# Patient Record
Sex: MALE | Race: WHITE | Employment: OTHER | URBAN - METROPOLITAN AREA
[De-identification: names, ages, dates, MRNs, and addresses within clinical notes are randomized per-mention and may not be internally consistent; named-entity substitution may affect disease eponyms.]

---

## 2017-04-04 PROBLEM — E83.51 HYPOCALCEMIA: Status: ACTIVE | Noted: 2017-04-04

## 2017-08-30 PROBLEM — C79.51 BONE METASTASIS: Status: ACTIVE | Noted: 2017-08-30

## 2018-03-13 ENCOUNTER — TELEPHONE (OUTPATIENT)
Dept: INFUSION THERAPY | Age: 68
End: 2018-03-13

## 2018-03-14 ENCOUNTER — TELEPHONE (OUTPATIENT)
Dept: INFUSION THERAPY | Age: 68
End: 2018-03-14

## 2018-03-20 ENCOUNTER — HOSPITAL ENCOUNTER (OUTPATIENT)
Dept: INFUSION THERAPY | Age: 68
Discharge: HOME | End: 2018-03-20
Payer: COMMERCIAL

## 2018-03-20 ENCOUNTER — OFFICE VISIT (OUTPATIENT)
Dept: ONCOLOGY | Age: 68
End: 2018-03-20
Payer: COMMERCIAL

## 2018-03-20 ENCOUNTER — TELEPHONE (OUTPATIENT)
Dept: INFUSION THERAPY | Age: 68
End: 2018-03-20

## 2018-03-20 VITALS
TEMPERATURE: 98 F | HEART RATE: 70 BPM | SYSTOLIC BLOOD PRESSURE: 112 MMHG | BODY MASS INDEX: 25.69 KG/M2 | HEIGHT: 73 IN | RESPIRATION RATE: 20 BRPM | WEIGHT: 193.8 LBS | DIASTOLIC BLOOD PRESSURE: 63 MMHG

## 2018-03-20 VITALS — RESPIRATION RATE: 20 BRPM | DIASTOLIC BLOOD PRESSURE: 72 MMHG | HEART RATE: 67 BPM | SYSTOLIC BLOOD PRESSURE: 117 MMHG

## 2018-03-20 DIAGNOSIS — C78.7 RECTAL CANCER METASTASIZED TO LIVER (HCC): Primary | ICD-10-CM

## 2018-03-20 DIAGNOSIS — C78.7 RECTAL CANCER METASTASIZED TO LIVER (HCC): ICD-10-CM

## 2018-03-20 DIAGNOSIS — C20 RECTAL CANCER METASTASIZED TO LIVER (HCC): ICD-10-CM

## 2018-03-20 DIAGNOSIS — C20 RECTAL CANCER METASTASIZED TO LIVER (HCC): Primary | ICD-10-CM

## 2018-03-20 LAB
ALBUMIN SERPL-MCNC: 3.1 G/DL (ref 3.5–5.2)
ALP BLD-CCNC: 161 U/L (ref 40–129)
ALT SERPL-CCNC: 27 U/L (ref 0–40)
ANION GAP SERPL CALCULATED.3IONS-SCNC: 10 MMOL/L (ref 7–16)
ANISOCYTOSIS: ABNORMAL
AST SERPL-CCNC: 31 U/L (ref 0–39)
BASOPHILS ABSOLUTE: 0 E9/L (ref 0–0.2)
BASOPHILS RELATIVE PERCENT: 0.9 % (ref 0–2)
BILIRUB SERPL-MCNC: 0.5 MG/DL (ref 0–1.2)
BUN BLDV-MCNC: 8 MG/DL (ref 8–23)
CALCIUM SERPL-MCNC: 8.1 MG/DL (ref 8.6–10.2)
CEA: 5.7 NG/ML (ref 0–5.2)
CHLORIDE BLD-SCNC: 110 MMOL/L (ref 98–107)
CO2: 22 MMOL/L (ref 22–29)
CREAT SERPL-MCNC: 0.9 MG/DL (ref 0.7–1.2)
EOSINOPHILS ABSOLUTE: 0.07 E9/L (ref 0.05–0.5)
EOSINOPHILS RELATIVE PERCENT: 1 % (ref 0–6)
GFR AFRICAN AMERICAN: >60
GFR NON-AFRICAN AMERICAN: >60 ML/MIN/1.73
GLUCOSE BLD-MCNC: 122 MG/DL (ref 74–109)
HCT VFR BLD CALC: 30.9 % (ref 37–54)
HEMOGLOBIN: 9.9 G/DL (ref 12.5–16.5)
LYMPHOCYTES ABSOLUTE: 0.56 E9/L (ref 1.5–4)
LYMPHOCYTES RELATIVE PERCENT: 7.5 % (ref 20–42)
MCH RBC QN AUTO: 33 PG (ref 26–35)
MCHC RBC AUTO-ENTMCNC: 32 % (ref 32–34.5)
MCV RBC AUTO: 103 FL (ref 80–99.9)
MONOCYTES ABSOLUTE: 0.28 E9/L (ref 0.1–0.95)
MONOCYTES RELATIVE PERCENT: 3.5 % (ref 2–12)
MYELOCYTE PERCENT: 2.5 % (ref 0–0)
NEUTROPHILS ABSOLUTE: 6.16 E9/L (ref 1.8–7.3)
NEUTROPHILS RELATIVE PERCENT: 85.5 % (ref 43–80)
NUCLEATED RED BLOOD CELLS: 0.5 /100 WBC
OVALOCYTES: ABNORMAL
PDW BLD-RTO: 17.2 FL (ref 11.5–15)
PLATELET # BLD: 109 E9/L (ref 130–450)
PMV BLD AUTO: 11.6 FL (ref 7–12)
POIKILOCYTES: ABNORMAL
POLYCHROMASIA: ABNORMAL
POTASSIUM SERPL-SCNC: 3.8 MMOL/L (ref 3.5–5)
RBC # BLD: 3 E12/L (ref 3.8–5.8)
SODIUM BLD-SCNC: 142 MMOL/L (ref 132–146)
TEAR DROP CELLS: ABNORMAL
TOTAL PROTEIN: 5.6 G/DL (ref 6.4–8.3)
TOXIC GRANULATION: ABNORMAL
WBC # BLD: 7 E9/L (ref 4.5–11.5)

## 2018-03-20 PROCEDURE — 96549 UNLISTED CHEMOTHERAPY PX: CPT

## 2018-03-20 PROCEDURE — 1036F TOBACCO NON-USER: CPT | Performed by: INTERNAL MEDICINE

## 2018-03-20 PROCEDURE — 82378 CARCINOEMBRYONIC ANTIGEN: CPT

## 2018-03-20 PROCEDURE — 96415 CHEMO IV INFUSION ADDL HR: CPT

## 2018-03-20 PROCEDURE — 2580000003 HC RX 258: Performed by: INTERNAL MEDICINE

## 2018-03-20 PROCEDURE — G8427 DOCREV CUR MEDS BY ELIG CLIN: HCPCS | Performed by: INTERNAL MEDICINE

## 2018-03-20 PROCEDURE — 3017F COLORECTAL CA SCREEN DOC REV: CPT | Performed by: INTERNAL MEDICINE

## 2018-03-20 PROCEDURE — G8484 FLU IMMUNIZE NO ADMIN: HCPCS | Performed by: INTERNAL MEDICINE

## 2018-03-20 PROCEDURE — 96417 CHEMO IV INFUS EACH ADDL SEQ: CPT

## 2018-03-20 PROCEDURE — 80053 COMPREHEN METABOLIC PANEL: CPT

## 2018-03-20 PROCEDURE — 1123F ACP DISCUSS/DSCN MKR DOCD: CPT | Performed by: INTERNAL MEDICINE

## 2018-03-20 PROCEDURE — 99214 OFFICE O/P EST MOD 30 MIN: CPT | Performed by: INTERNAL MEDICINE

## 2018-03-20 PROCEDURE — G8419 CALC BMI OUT NRM PARAM NOF/U: HCPCS | Performed by: INTERNAL MEDICINE

## 2018-03-20 PROCEDURE — 96411 CHEMO IV PUSH ADDL DRUG: CPT

## 2018-03-20 PROCEDURE — 6360000002 HC RX W HCPCS: Performed by: INTERNAL MEDICINE

## 2018-03-20 PROCEDURE — 4040F PNEUMOC VAC/ADMIN/RCVD: CPT | Performed by: INTERNAL MEDICINE

## 2018-03-20 PROCEDURE — 85025 COMPLETE CBC W/AUTO DIFF WBC: CPT

## 2018-03-20 PROCEDURE — 96416 CHEMO PROLONG INFUSE W/PUMP: CPT

## 2018-03-20 PROCEDURE — 96413 CHEMO IV INFUSION 1 HR: CPT

## 2018-03-20 PROCEDURE — 96375 TX/PRO/DX INJ NEW DRUG ADDON: CPT

## 2018-03-20 RX ORDER — PALONOSETRON 0.05 MG/ML
0.25 INJECTION, SOLUTION INTRAVENOUS ONCE
Status: CANCELLED | OUTPATIENT
Start: 2018-03-20

## 2018-03-20 RX ORDER — SODIUM CHLORIDE 9 MG/ML
INJECTION, SOLUTION INTRAVENOUS CONTINUOUS
Status: DISCONTINUED | OUTPATIENT
Start: 2018-03-20 | End: 2018-03-21 | Stop reason: HOSPADM

## 2018-03-20 RX ORDER — METHYLPREDNISOLONE SODIUM SUCCINATE 125 MG/2ML
125 INJECTION, POWDER, LYOPHILIZED, FOR SOLUTION INTRAMUSCULAR; INTRAVENOUS ONCE
Status: CANCELLED | OUTPATIENT
Start: 2018-03-20 | End: 2018-03-20

## 2018-03-20 RX ORDER — 0.9 % SODIUM CHLORIDE 0.9 %
10 VIAL (ML) INJECTION ONCE
Status: CANCELLED | OUTPATIENT
Start: 2018-03-20 | End: 2018-03-20

## 2018-03-20 RX ORDER — DEXAMETHASONE SODIUM PHOSPHATE 10 MG/ML
10 INJECTION, SOLUTION INTRAMUSCULAR; INTRAVENOUS ONCE
Status: COMPLETED | OUTPATIENT
Start: 2018-03-20 | End: 2018-03-20

## 2018-03-20 RX ORDER — ATROPINE SULFATE 1 MG/ML
0.4 INJECTION, SOLUTION INTRAMUSCULAR; INTRAVENOUS; SUBCUTANEOUS ONCE
Status: CANCELLED | OUTPATIENT
Start: 2018-03-20 | End: 2018-03-20

## 2018-03-20 RX ORDER — HEPARIN SODIUM (PORCINE) LOCK FLUSH IV SOLN 100 UNIT/ML 100 UNIT/ML
500 SOLUTION INTRAVENOUS PRN
Status: CANCELLED | OUTPATIENT
Start: 2018-03-20

## 2018-03-20 RX ORDER — SODIUM CHLORIDE 9 MG/ML
INJECTION, SOLUTION INTRAVENOUS CONTINUOUS
Status: CANCELLED | OUTPATIENT
Start: 2018-03-20

## 2018-03-20 RX ORDER — SODIUM CHLORIDE 0.9 % (FLUSH) 0.9 %
10 SYRINGE (ML) INJECTION PRN
Status: CANCELLED | OUTPATIENT
Start: 2018-03-20

## 2018-03-20 RX ORDER — DIPHENHYDRAMINE HYDROCHLORIDE 50 MG/ML
50 INJECTION INTRAMUSCULAR; INTRAVENOUS ONCE
Status: CANCELLED | OUTPATIENT
Start: 2018-03-20 | End: 2018-03-20

## 2018-03-20 RX ORDER — FLUOROURACIL 50 MG/ML
850 INJECTION, SOLUTION INTRAVENOUS ONCE
Status: COMPLETED | OUTPATIENT
Start: 2018-03-20 | End: 2018-03-20

## 2018-03-20 RX ORDER — ATROPINE SULFATE 1 MG/ML
0.4 INJECTION, SOLUTION INTRAMUSCULAR; INTRAVENOUS; SUBCUTANEOUS ONCE
Status: COMPLETED | OUTPATIENT
Start: 2018-03-20 | End: 2018-03-20

## 2018-03-20 RX ORDER — PALONOSETRON 0.05 MG/ML
0.25 INJECTION, SOLUTION INTRAVENOUS ONCE
Status: COMPLETED | OUTPATIENT
Start: 2018-03-20 | End: 2018-03-20

## 2018-03-20 RX ORDER — SODIUM CHLORIDE 9 MG/ML
INJECTION, SOLUTION INTRAVENOUS CONTINUOUS
Status: CANCELLED | OUTPATIENT
Start: 2018-03-20 | End: 2018-04-06

## 2018-03-20 RX ORDER — SODIUM CHLORIDE 0.9 % (FLUSH) 0.9 %
10 SYRINGE (ML) INJECTION PRN
Status: DISCONTINUED | OUTPATIENT
Start: 2018-03-20 | End: 2018-03-21 | Stop reason: HOSPADM

## 2018-03-20 RX ORDER — FLUOROURACIL 50 MG/ML
850 INJECTION, SOLUTION INTRAVENOUS ONCE
Status: CANCELLED | OUTPATIENT
Start: 2018-03-20

## 2018-03-20 RX ORDER — POTASSIUM CHLORIDE 750 MG/1
10 TABLET, FILM COATED, EXTENDED RELEASE ORAL 2 TIMES DAILY
COMMUNITY
Start: 2018-03-19 | End: 2018-06-05

## 2018-03-20 RX ADMIN — PALONOSETRON HYDROCHLORIDE 0.25 MG: 0.25 INJECTION INTRAVENOUS at 11:27

## 2018-03-20 RX ADMIN — SODIUM CHLORIDE 400 MG: 0.9 INJECTION, SOLUTION INTRAVENOUS at 11:54

## 2018-03-20 RX ADMIN — LEUCOVORIN CALCIUM 850 MG: 350 INJECTION, POWDER, LYOPHILIZED, FOR SOLUTION INTRAMUSCULAR; INTRAVENOUS at 11:54

## 2018-03-20 RX ADMIN — ATROPINE SULFATE 0.4 MG: 1 INJECTION, SOLUTION INTRAMUSCULAR; INTRAVENOUS; SUBCUTANEOUS at 11:28

## 2018-03-20 RX ADMIN — DEXAMETHASONE SODIUM PHOSPHATE 10 MG: 10 INJECTION, SOLUTION INTRAMUSCULAR; INTRAVENOUS at 11:29

## 2018-03-20 RX ADMIN — Medication 10 ML: at 13:57

## 2018-03-20 RX ADMIN — SODIUM CHLORIDE: 900 INJECTION, SOLUTION INTRAVENOUS at 10:39

## 2018-03-20 RX ADMIN — BEVACIZUMAB 500 MG: 100 INJECTION, SOLUTION INTRAVENOUS at 10:49

## 2018-03-20 RX ADMIN — FLUOROURACIL 850 MG: 50 INJECTION, SOLUTION INTRAVENOUS at 13:59

## 2018-03-20 NOTE — PROGRESS NOTES
[place] [id number]  Attending Clinic Note     Reason for Visit: Follow-up on a patient with Metastatic Rectal Cancer.     PCP: Lloyd Demarco MD     History of Present Illness:  74 y/o  male who was referred to see Dr. Luisito Hilton (GI team) for evaluation of bright red blood per rectum, mild anemia and change in bowel habits with diarrhea. CEA 2640 on 10/19/2015. AlcP 299 AST 57 ALT 75 on 10/19/2015. Colonoscopy in 2013 noted no significant polyps, colitis or lesions at that time. Denies any Family History of colorectal cancer or polyps.     Colonoscopy on 10/19/2015 revealed:  1. Ascending polyp, 8 mm, hot snare: Tubulovillous adenoma. 2. Transverse polyp, 1 cm, hot snare: Serrated polyp most consistent with sessile serrated adenoma. 3. Five splenic flexure polyps, three - 5 mm, 7 mm, 8 mm, hot snare and biopsy: Four segments of Tubular Adenoma  4. Descending polyp, 5 mm, biopsy: Tubular adenoma. 5. Sigmoid polyp, 4 mm biopsy, Serrated polyp most consistent with sessile serrated adenoma. 6. Two rectal polyps, 4 mm biopsy: Serrated polyp most consistent with hyperplastic polyp. 7. Rectosigmoid colon mass (large mass approximately 65% circumference of the lumen; Very friable, firm and hard): Tubulovillous adenoma with associated focal erosion and fibroplasia.      CT scan abdomen/pelvis on 10/26/2015:  1. Small nodules at lung bases likely represent metastatic colon   cancer. 2. Extensive likely metastatic colon cancer throughout the liver.      3. Mild mural thickening and luminal narrowing in the   terminal ileum, otherwise nonspecific.      Colonoscopy with snare removal rectal mass was performed by Dr. Norm Hedrick. Pathology proved:  Rectal polyp: Invasive adenocarcinoma involving villous adenoma and extending to the cauterized edge of excision. KRAS Mutation: Mutation detected. BRAF Mutation: Mutation not detected.   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     CEA 3488. Bone scan on 11/10/2015 noted no metastatic disease. CT chest on 11/10/2015 revealed Multiple pulmonary nodules in the upper and lower lobes consistent with metastatic disease;   Hepatic metastasis also visualized. 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 Mediport was placed. Cycle # 1 of FOLFOX + Avastin was on 11/23/2015. CEA was 4555 on 11/23/2015. Cycle # 2 of FOLFOX + Avastin was on 12/08/2015. CEA was 3160 on 12/08/2015. Cycle # 3 of FOLFOX + Avastin was on 12/22/2015. CEA was 2516 on 12/21/2015. Cycle # 4 of FOLFOX + Avastin was on 01/05/2016. CEA was 2098 on 01/05/2015. Cycle # 5 of FOLFOX + Avastin was on 01/19/2016. CEA was 1511 on 01/05/2015.     -Bone scan on 01/26/2016 noted no metastatic disease. CT chest on 01/26/2016 revealed Significant response to treatment with no visible residual nodules. CT scan abdomen/pelvis on 01/26/2016 noted Interval decreased size of multiple masses in the liver compatible with treatment response. Continue another 2 months of FOLFOX + Avastin and repeat scans. Cycle # 6 of FOLFOX + Avastin was on 02/02/2016.  on 02/02/2016. Cycle # 7 of FOLFOX + Avastin was on 02/16/2016.  on 02/16/2016. Cycle # 8 of FOLFOX + Avastin was on 03/01/2016.  on 03/01/2016. Cycle # 9 of FOLFOX + Avastin was on 03/15/2016.  on 03/15/2016. Cycle # 10 of FOLFOX + Avastin was on 03/29/2016. .4 on 03/29/2016. Cycle # 11 of FOLFOX + Avastin was on 04/12/2016. .4 on 04/12/2016.     Re-staging scans on 04/19/2016: CT Chest: clear lungs; no evidence of recurring pulmonary nodule; CT Abdomen/Pelvis: Further interval decrease in size of the multiple metastatic hepatic lesions, the largest lesion now measures 3.9 x 3.5 cm and previously measured 4.5 cm in maximum diameter. No mesenteric lymphadenopathy is identified; Bone Scan: No evidence of osseous metastasis.   Continue same regimen and re-stage in 2-3 months. Cycle # 12 FOLFOX + Avastin was on 04/26/2016. .5 on 04/26/2016. Cycle # 13 FOLFOX + Avastin was on 05/10/2016. .3 on 05/10/2016. Cycle # 14 FOLFOX+AVASTIN was on 05/24/2016. .5 on 05/24/2016. Cycle # 15 FOLFOX + Avastin was on 06/07/2016. CEA 90.5 on 06/07/2016. Admitted to [facility] 06/13/2016-06/16/2016 for abdominal pain: EGD noted 1.5 cm clean based duodenal bulb ulceration s/p epinephrine and bicap per Dr. Serene Camp. No active bleeding. A. Stomach, biopsy: Mild chronic gastritis, immunostain negative for Helicobacter  B. Esophagus, biopsy: Gastric glandular mucosa with prominent intestinal metaplasia ([place]'s epithelium), negative for epithelial dysplasia, esophageal squamous mucosa not identified  Cycle # 16 FOLFOX (discontinued avastin (bevacizumab) given association of peptic ulcer disease and known association of GI perforation) was on 06/21/2016. Cycle # 17 FOLFOX was on 07/05/2016. CEA 52.6 on 07/19/2016. Cycle # 17 FOLFOX was on 07/05/2016. CEA 52.6 on 07/05/2016. CEA 47.6 on 07/19/2016.     Re-staging scans 07/19/2106: CT Chest negative for metastatic disease. CT Abdomen/Pelvis: Stable hepatic lesions; Question new mural thickening in the cecum. Bone Scan: New Let hip lesion suspicious for bone metastasis.     Increased CEA; new mural thickening in the cecum and new left hip lesion suspicious for bone metastasis are consistent with disease progression; He derived maximum benefit from FOLFOX/AVASTIN. D/C FOLFOX/AVASTIN. We recommended FOLFIRI second line therapy. Cycle # 1 FOLFIRI was on 08/02/2016. CEA 40.8 on 08/02/2016. Xgeva q4 weeks started on 08/02/2016. Cycle # 2 FOLFIRI was on 08/16/2016. CEA 31.7 on 08/16/2016. Cycle # 3 FOLFIRI (added Avastin) was on 08/30/2016 given that ulcers healed on EGD 08/15/2016 by Dr. Hossein Covert; Protonix bid since avastin re-started. Colonoscopy on 08/29/2016 (to look at the cecal area) unremarkable.  CEA 26.7 on 08/30/2016. Cycle # 4 FOLFIRI/Avastin was on 09/13/2016. CEA 23.4 on 09/13/2016. Cycle # 5 FOLFIRI/Avastin was on 09/27/2016. CEA 22.3 on 09/27/2016. Cycle # 6 FOLFIRI/Avastin was on 10/11/2016. CEA 18.4 on 10/11/2016.      Bone scan 10/21/2016 stable bone metastasis; ? New lesion anterior left sixth rib likely interval healing fracture. CT abdomen/pelvis revealed stable hepatic metastasis. CT chest negative for metastatic disease. Continue FOLFIRI/Avastin and repeat scans in 3 months. Cycle # 7 FOLFIRI/Avastin was on 10/25/2016. CEA 16.1  Cycle # 8 FOLFIRI/Avastin was on 11/08/2016. CEA 15.7  Cycle # 9 FOLFIRI/Avastin was on 11/29/2016. CEA 13.6 on 11/29/2016. Cycle # 10 FOLFIRI/Avastin was on 12/13/2016. CEA 10.6 on 12/13/2016. Cycle # 11 FOLFIRI/Avastin was on 12/27/2016. CEA 11.1 on 12/27/2016. Cycle # 12 FOLFIRI/Avastin was on 01/10/2017. CEA 9.7 on 01/10/2017.       CT chest 01/23/2017 No new pulmonary nodule or lymphadenopathy. Patchy groundglass opacities within the left lower lobe, suggestive of infectious or inflammatory etiology. Followup CT chest in 3 months. Slight increased linear sclerosis along the left anterior sixth rib corresponding to an area of increased uptake on the prior bone scan from 10/21/2016, favored to be related to interval healing changes of a nondisplaced fracture. CT abdomen/pelvis on 01/23/2017 Redemonstration of multiple hepatic metastases, which are similar compared to the prior CT from 10/21/2016. Redemonstration of area of increased sclerosis along the right acetabulum suspicious for metastatic disease. Bone scan on 01/23/2017 Stable subtle uptake within the left proximal diaphysis, again suggestive of metastatic disease  Decreased subtle uptake within the medial right acetabulum. Decreased uptake within the left anterior sixth rib corresponding to linear sclerosis on the recent CT, favored to be related to an joint rib fracture.     Continue FOLFIRI + Avastin in acute distress. HEENT: PERRLA, EOMI. No oral lesions. NECK: Supple. Without lymphadenopathy. LUNGS: Lungs are CTA bilaterally, with no wheezing, crackles or rhonchi. CARDIOVASCULAR: Regular rhythm. No murmurs, rubs or gallops. ABDOMEN: Soft. Non-tender, non-distended. Positive bowel sounds. EXTREMITIES: Without clubbing, cyanosis, or edema. NEUROLOGIC: No focal deficits. 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 KRAS Mutation: Mutation detected. BRAF Mutation: Mutation not detected. 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 CT scan abdomen/pelvis on 10/26/2015 revealed small nodules at the lung bases, extensive liver lesions consistent with metastatic rectosigmoid cancer. CEA 3488 on 10/30/2015. Bone scan on 11/10/2015 noted no metastatic disease. CT chest on 11/10/2015 revealed Multiple pulmonary nodules in the upper and lower lobes consistent with metastatic disease; Hepatic metastasis also visualized. 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 Mediport was placed. Cycle # 1 of FOLFOX + Avastin was on 11/23/2015. CEA was 4555 on 11/23/2015. Cycle # 2 of FOLFOX + Avastin was on 12/08/2015. CEA was 3160 on 12/08/2015. Cycle # 3 of FOLFOX + Avastin was on 12/22/2015. CEA was 2516 on 12/21/2015. Cycle # 4 of FOLFOX + Avastin was on 01/05/2016. CEA was 2098 on 01/05/2015. Cycle # 5 of FOLFOX + Avastin was on 01/19/2016. CEA was 1511 on 01/05/2015.     -Bone scan on 01/26/2016 noted no metastatic disease. CT chest on 01/26/2016 revealed Significant response to treatment with no visible residual nodules. CT scan abdomen/pelvis on 01/26/2016 noted Interval decreased size of multiple masses in the liver compatible with treatment response. Continue another 2 months of FOLFOX + Avastin and repeat scans. Cycle # 6 of FOLFOX + Avastin was on 02/02/2016.   on Question new mural thickening in the cecum. Bone Scan: New Let hip lesion suspicious for bone metastasis.     Increased CEA; new mural thickening in the cecum and new left hip lesion suspicious for bone metastasis are consistent with disease progression; He derived maximum benefit from FOLFOX/AVASTIN. D/C FOLFOX/AVASTIN. We recommended FOLFIRI second line therapy. Cycle # 1 FOLFIRI was on 08/02/2016. CEA 40.8 on 08/02/2016. Xgeva q4 weeks started on 08/02/2016. Cycle # 2 FOLFIRI was on 08/16/2016. CEA 31.7 on 08/16/2016. Cycle # 3 FOLFIRI (added Avastin) was on 08/30/2016 given that ulcers healed on EGD 08/15/2016 by Dr. Lg Farmer; Protonix bid since avastin re-started. Colonoscopy on 08/29/2016 (to look at the cecal area) unremarkable. CEA 26.7 on 08/30/2016. Cycle # 4 FOLFIRI/Avastin was on 09/13/2016. CEA 23.4 on 09/13/2016. Cycle # 5 FOLFIRI/Avastin was on 09/27/2016. CEA 22.3 on 09/27/2016. Cycle # 6 FOLFIRI/Avastin was on 10/11/2016. CEA 18.4 on 10/11/2016.      Bone scan 10/21/2016 stable bone metastasis; ? New lesion anterior left sixth rib likely interval healing fracture. CT abdomen/pelvis revealed stable hepatic metastasis. CT chest negative for metastatic disease. Continue FOLFIRI/Avastin and repeat scans in 3 months. Cycle # 7 FOLFIRI/Avastin was on 10/25/2016. CEA 16.1 on 10/25/2016. Cycle # 8 FOLFIRI/Avastin was on 11/08/2016. CEA 15.7 on 11/08/2016. Cycle # 9 FOLFIRI/Avastin was on 11/29/2016. CEA 13.6 on 11/29/2016. Cycle # 10 FOLFIRI/Avastin was on 12/13/2016. CEA 10.6 on 12/13/2016. Cycle # 11 FOLFIRI/Avastin was on 12/27/2016. CEA 11.1 on 12/27/2016. Cycle # 12 FOLFIRI/Avastin was on 01/10/2017. CEA 9.7 on 01/10/2017. CT chest 01/23/2017 No new pulmonary nodule or lymphadenopathy. Patchy groundglass opacities within the left lower lobe, suggestive of infectious or inflammatory etiology. Followup CT chest in 3 months.  Slight increased linear sclerosis along the

## 2018-03-20 NOTE — PROGRESS NOTES
Spoke with Dr. Saadia Arora after pt. stated that  he had been hospitalized at [facility] following an endoscopy and having \" gi bleeding\" and receiving 2 units of PRBC's. Inquired whether Avastin needed to be  held. Dr. Saadia Arora states since pt's hemoglobin is 9.9. Okay to proceed with Avastin.

## 2018-03-20 NOTE — TELEPHONE ENCOUNTER
Called patient to give him his appt date/time for his scans (CT chest, CT abdomen/pelvis, and NM bone scan). He is to arrive at [address] [place] [address] registration desk on 3/26/18 at 10:30 am, will then go to radiology afterward. He is to be NPO (nothing to eat or drink) beginning at 8:30 am.  Patient understood this information.

## 2018-03-22 ENCOUNTER — HOSPITAL ENCOUNTER (OUTPATIENT)
Dept: INFUSION THERAPY | Age: 68
Discharge: HOME | End: 2018-03-22
Payer: COMMERCIAL

## 2018-03-22 DIAGNOSIS — C20 RECTAL CANCER METASTASIZED TO LIVER (HCC): ICD-10-CM

## 2018-03-22 DIAGNOSIS — C78.7 RECTAL CANCER METASTASIZED TO LIVER (HCC): ICD-10-CM

## 2018-03-22 DIAGNOSIS — E83.51 HYPOCALCEMIA: ICD-10-CM

## 2018-03-22 PROCEDURE — 96372 THER/PROPH/DIAG INJ SC/IM: CPT

## 2018-03-22 PROCEDURE — 6360000002 HC RX W HCPCS: Performed by: INTERNAL MEDICINE

## 2018-03-22 PROCEDURE — 2580000003 HC RX 258: Performed by: INTERNAL MEDICINE

## 2018-03-22 PROCEDURE — 99214 OFFICE O/P EST MOD 30 MIN: CPT

## 2018-03-22 RX ORDER — SODIUM CHLORIDE 0.9 % (FLUSH) 0.9 %
10 SYRINGE (ML) INJECTION PRN
Status: CANCELLED | OUTPATIENT
Start: 2018-03-22

## 2018-03-22 RX ORDER — HEPARIN SODIUM (PORCINE) LOCK FLUSH IV SOLN 100 UNIT/ML 100 UNIT/ML
500 SOLUTION INTRAVENOUS PRN
Status: CANCELLED | OUTPATIENT
Start: 2018-03-22

## 2018-03-22 RX ORDER — SODIUM CHLORIDE 0.9 % (FLUSH) 0.9 %
10 SYRINGE (ML) INJECTION PRN
Status: DISCONTINUED | OUTPATIENT
Start: 2018-03-22 | End: 2018-03-23 | Stop reason: HOSPADM

## 2018-03-22 RX ORDER — HEPARIN SODIUM (PORCINE) LOCK FLUSH IV SOLN 100 UNIT/ML 100 UNIT/ML
500 SOLUTION INTRAVENOUS PRN
Status: DISCONTINUED | OUTPATIENT
Start: 2018-03-22 | End: 2018-03-23 | Stop reason: HOSPADM

## 2018-03-22 RX ADMIN — Medication 10 ML: at 12:37

## 2018-03-22 RX ADMIN — PEGFILGRASTIM 6 MG: 6 INJECTION SUBCUTANEOUS at 12:37

## 2018-03-22 RX ADMIN — Medication 500 UNITS: at 12:37

## 2018-03-22 NOTE — PROGRESS NOTES
Presents to clinic for CADD pump removal. Port site appears normal. Denies problems/concerns. Received 251.1 ml of 5-FU & reservoir 8.9 of 5-FU. Port flushed with 10 ml. NSS followed by 5 ml. Heparin Rinse prior to de access. DSD to area. Tolerated well. Encouraged to call clinic with questions/concerns.

## 2018-03-26 ENCOUNTER — HOSPITAL ENCOUNTER (OUTPATIENT)
Dept: CT IMAGING | Age: 68
Discharge: HOME | End: 2018-03-26
Payer: COMMERCIAL

## 2018-03-26 ENCOUNTER — HOSPITAL ENCOUNTER (OUTPATIENT)
Dept: NUCLEAR MEDICINE | Age: 68
Discharge: HOME | End: 2018-03-26
Payer: COMMERCIAL

## 2018-03-26 DIAGNOSIS — C20 RECTAL CANCER METASTASIZED TO LIVER (HCC): ICD-10-CM

## 2018-03-26 DIAGNOSIS — C78.7 RECTAL CANCER METASTASIZED TO LIVER (HCC): ICD-10-CM

## 2018-03-26 DIAGNOSIS — C78.7 SECONDARY MALIGNANT NEOPLASM OF LIVER (HCC): ICD-10-CM

## 2018-03-26 DIAGNOSIS — C20 MALIGNANT NEOPLASM OF RECTUM (HCC): ICD-10-CM

## 2018-03-26 PROCEDURE — 78306 BONE IMAGING WHOLE BODY: CPT

## 2018-03-26 PROCEDURE — A9503 TC99M MEDRONATE: HCPCS | Performed by: RADIOLOGY

## 2018-03-26 PROCEDURE — 6360000004 HC RX CONTRAST MEDICATION: Performed by: RADIOLOGY

## 2018-03-26 PROCEDURE — 3430000000 HC RX DIAGNOSTIC RADIOPHARMACEUTICAL: Performed by: RADIOLOGY

## 2018-03-26 PROCEDURE — 71260 CT THORAX DX C+: CPT

## 2018-03-26 PROCEDURE — 74177 CT ABD & PELVIS W/CONTRAST: CPT

## 2018-03-26 RX ORDER — TC 99M MEDRONATE 20 MG/10ML
25 INJECTION, POWDER, LYOPHILIZED, FOR SOLUTION INTRAVENOUS
Status: COMPLETED | OUTPATIENT
Start: 2018-03-26 | End: 2018-03-26

## 2018-03-26 RX ADMIN — Medication 25 MILLICURIE: at 11:04

## 2018-03-26 RX ADMIN — IOPAMIDOL 80 ML: 755 INJECTION, SOLUTION INTRAVENOUS at 13:04

## 2018-03-26 RX ADMIN — IOHEXOL 50 ML: 240 INJECTION, SOLUTION INTRATHECAL; INTRAVASCULAR; INTRAVENOUS; ORAL at 13:03

## 2018-04-03 ENCOUNTER — OFFICE VISIT (OUTPATIENT)
Dept: ONCOLOGY | Age: 68
End: 2018-04-03
Payer: COMMERCIAL

## 2018-04-03 ENCOUNTER — HOSPITAL ENCOUNTER (OUTPATIENT)
Age: 68
Discharge: HOME | End: 2018-04-05
Payer: MEDICARE

## 2018-04-03 ENCOUNTER — HOSPITAL ENCOUNTER (OUTPATIENT)
Dept: INFUSION THERAPY | Age: 68
Discharge: HOME | End: 2018-04-03
Payer: MEDICARE

## 2018-04-03 VITALS
WEIGHT: 186.9 LBS | HEART RATE: 64 BPM | TEMPERATURE: 97.9 F | DIASTOLIC BLOOD PRESSURE: 61 MMHG | RESPIRATION RATE: 20 BRPM | BODY MASS INDEX: 24.77 KG/M2 | SYSTOLIC BLOOD PRESSURE: 116 MMHG | HEIGHT: 73 IN

## 2018-04-03 VITALS — SYSTOLIC BLOOD PRESSURE: 140 MMHG | DIASTOLIC BLOOD PRESSURE: 71 MMHG | HEART RATE: 73 BPM | RESPIRATION RATE: 20 BRPM

## 2018-04-03 DIAGNOSIS — C78.7 RECTAL CANCER METASTASIZED TO LIVER (HCC): ICD-10-CM

## 2018-04-03 DIAGNOSIS — C20 RECTAL CANCER METASTASIZED TO LIVER (HCC): ICD-10-CM

## 2018-04-03 DIAGNOSIS — C20 RECTAL CANCER (HCC): Primary | ICD-10-CM

## 2018-04-03 LAB
ALBUMIN SERPL-MCNC: 3.2 G/DL (ref 3.5–5.2)
ALP BLD-CCNC: 157 U/L (ref 40–129)
ALT SERPL-CCNC: 20 U/L (ref 0–40)
ANION GAP SERPL CALCULATED.3IONS-SCNC: 10 MMOL/L (ref 7–16)
AST SERPL-CCNC: 22 U/L (ref 0–39)
BASOPHILS ABSOLUTE: 0.03 E9/L (ref 0–0.2)
BASOPHILS RELATIVE PERCENT: 0.7 % (ref 0–2)
BILIRUB SERPL-MCNC: 0.5 MG/DL (ref 0–1.2)
BUN BLDV-MCNC: 10 MG/DL (ref 8–23)
CALCIUM SERPL-MCNC: 8.4 MG/DL (ref 8.6–10.2)
CEA: 6.3 NG/ML (ref 0–5.2)
CHLORIDE BLD-SCNC: 111 MMOL/L (ref 98–107)
CO2: 24 MMOL/L (ref 22–29)
CREAT SERPL-MCNC: 0.9 MG/DL (ref 0.7–1.2)
EOSINOPHILS ABSOLUTE: 0.08 E9/L (ref 0.05–0.5)
EOSINOPHILS RELATIVE PERCENT: 2 % (ref 0–6)
GFR AFRICAN AMERICAN: >60
GFR NON-AFRICAN AMERICAN: >60 ML/MIN/1.73
GLUCOSE BLD-MCNC: 104 MG/DL (ref 74–109)
HCT VFR BLD CALC: 27.6 % (ref 37–54)
HEMOGLOBIN: 8.6 G/DL (ref 12.5–16.5)
IMMATURE GRANULOCYTES #: 0.18 E9/L
IMMATURE GRANULOCYTES %: 4.4 % (ref 0–5)
LYMPHOCYTES ABSOLUTE: 0.6 E9/L (ref 1.5–4)
LYMPHOCYTES RELATIVE PERCENT: 14.8 % (ref 20–42)
MCH RBC QN AUTO: 32.6 PG (ref 26–35)
MCHC RBC AUTO-ENTMCNC: 31.2 % (ref 32–34.5)
MCV RBC AUTO: 104.5 FL (ref 80–99.9)
MONOCYTES ABSOLUTE: 0.24 E9/L (ref 0.1–0.95)
MONOCYTES RELATIVE PERCENT: 5.9 % (ref 2–12)
NEUTROPHILS ABSOLUTE: 2.92 E9/L (ref 1.8–7.3)
NEUTROPHILS RELATIVE PERCENT: 72.2 % (ref 43–80)
PDW BLD-RTO: 17.2 FL (ref 11.5–15)
PLATELET # BLD: 109 E9/L (ref 130–450)
PMV BLD AUTO: 10.2 FL (ref 7–12)
POTASSIUM SERPL-SCNC: 3.9 MMOL/L (ref 3.5–5)
RBC # BLD: 2.64 E12/L (ref 3.8–5.8)
SODIUM BLD-SCNC: 145 MMOL/L (ref 132–146)
TOTAL PROTEIN: 5.7 G/DL (ref 6.4–8.3)
WBC # BLD: 4.1 E9/L (ref 4.5–11.5)

## 2018-04-03 PROCEDURE — 3017F COLORECTAL CA SCREEN DOC REV: CPT | Performed by: INTERNAL MEDICINE

## 2018-04-03 PROCEDURE — 1036F TOBACCO NON-USER: CPT | Performed by: INTERNAL MEDICINE

## 2018-04-03 PROCEDURE — 96368 THER/DIAG CONCURRENT INF: CPT

## 2018-04-03 PROCEDURE — 6360000002 HC RX W HCPCS: Performed by: INTERNAL MEDICINE

## 2018-04-03 PROCEDURE — 96415 CHEMO IV INFUSION ADDL HR: CPT

## 2018-04-03 PROCEDURE — 96417 CHEMO IV INFUS EACH ADDL SEQ: CPT

## 2018-04-03 PROCEDURE — 96372 THER/PROPH/DIAG INJ SC/IM: CPT

## 2018-04-03 PROCEDURE — 96413 CHEMO IV INFUSION 1 HR: CPT

## 2018-04-03 PROCEDURE — 4040F PNEUMOC VAC/ADMIN/RCVD: CPT | Performed by: INTERNAL MEDICINE

## 2018-04-03 PROCEDURE — 85025 COMPLETE CBC W/AUTO DIFF WBC: CPT

## 2018-04-03 PROCEDURE — 2580000003 HC RX 258: Performed by: INTERNAL MEDICINE

## 2018-04-03 PROCEDURE — 99214 OFFICE O/P EST MOD 30 MIN: CPT | Performed by: INTERNAL MEDICINE

## 2018-04-03 PROCEDURE — 96375 TX/PRO/DX INJ NEW DRUG ADDON: CPT

## 2018-04-03 PROCEDURE — 1123F ACP DISCUSS/DSCN MKR DOCD: CPT | Performed by: INTERNAL MEDICINE

## 2018-04-03 PROCEDURE — 80053 COMPREHEN METABOLIC PANEL: CPT

## 2018-04-03 PROCEDURE — 96411 CHEMO IV PUSH ADDL DRUG: CPT

## 2018-04-03 PROCEDURE — 96416 CHEMO PROLONG INFUSE W/PUMP: CPT

## 2018-04-03 PROCEDURE — G8427 DOCREV CUR MEDS BY ELIG CLIN: HCPCS | Performed by: INTERNAL MEDICINE

## 2018-04-03 PROCEDURE — G8420 CALC BMI NORM PARAMETERS: HCPCS | Performed by: INTERNAL MEDICINE

## 2018-04-03 PROCEDURE — 82378 CARCINOEMBRYONIC ANTIGEN: CPT

## 2018-04-03 RX ORDER — SODIUM CHLORIDE 9 MG/ML
INJECTION, SOLUTION INTRAVENOUS CONTINUOUS
Status: DISCONTINUED | OUTPATIENT
Start: 2018-04-03 | End: 2018-04-04 | Stop reason: HOSPADM

## 2018-04-03 RX ORDER — 0.9 % SODIUM CHLORIDE 0.9 %
10 VIAL (ML) INJECTION ONCE
Status: CANCELLED | OUTPATIENT
Start: 2018-04-03 | End: 2018-04-03

## 2018-04-03 RX ORDER — HEPARIN SODIUM (PORCINE) LOCK FLUSH IV SOLN 100 UNIT/ML 100 UNIT/ML
500 SOLUTION INTRAVENOUS PRN
Status: CANCELLED | OUTPATIENT
Start: 2018-04-03

## 2018-04-03 RX ORDER — SODIUM CHLORIDE 9 MG/ML
INJECTION, SOLUTION INTRAVENOUS CONTINUOUS
Status: CANCELLED | OUTPATIENT
Start: 2018-04-03

## 2018-04-03 RX ORDER — DEXAMETHASONE SODIUM PHOSPHATE 10 MG/ML
10 INJECTION, SOLUTION INTRAMUSCULAR; INTRAVENOUS ONCE
Status: COMPLETED | OUTPATIENT
Start: 2018-04-03 | End: 2018-04-03

## 2018-04-03 RX ORDER — METHYLPREDNISOLONE SODIUM SUCCINATE 125 MG/2ML
125 INJECTION, POWDER, LYOPHILIZED, FOR SOLUTION INTRAMUSCULAR; INTRAVENOUS ONCE
Status: CANCELLED | OUTPATIENT
Start: 2018-04-03 | End: 2018-04-03

## 2018-04-03 RX ORDER — PALONOSETRON 0.05 MG/ML
0.25 INJECTION, SOLUTION INTRAVENOUS ONCE
Status: COMPLETED | OUTPATIENT
Start: 2018-04-03 | End: 2018-04-03

## 2018-04-03 RX ORDER — DIPHENHYDRAMINE HYDROCHLORIDE 50 MG/ML
50 INJECTION INTRAMUSCULAR; INTRAVENOUS ONCE
Status: CANCELLED | OUTPATIENT
Start: 2018-04-03 | End: 2018-04-03

## 2018-04-03 RX ORDER — PALONOSETRON 0.05 MG/ML
0.25 INJECTION, SOLUTION INTRAVENOUS ONCE
Status: CANCELLED | OUTPATIENT
Start: 2018-04-03

## 2018-04-03 RX ORDER — FLUOROURACIL 50 MG/ML
850 INJECTION, SOLUTION INTRAVENOUS ONCE
Status: COMPLETED | OUTPATIENT
Start: 2018-04-03 | End: 2018-04-03

## 2018-04-03 RX ORDER — SODIUM CHLORIDE 9 MG/ML
INJECTION, SOLUTION INTRAVENOUS CONTINUOUS
Status: CANCELLED | OUTPATIENT
Start: 2018-04-03 | End: 2018-04-21

## 2018-04-03 RX ORDER — ATROPINE SULFATE 1 MG/ML
0.4 INJECTION, SOLUTION INTRAMUSCULAR; INTRAVENOUS; SUBCUTANEOUS ONCE
Status: DISCONTINUED | OUTPATIENT
Start: 2018-04-03 | End: 2018-04-03

## 2018-04-03 RX ORDER — SODIUM CHLORIDE 0.9 % (FLUSH) 0.9 %
10 SYRINGE (ML) INJECTION PRN
Status: CANCELLED | OUTPATIENT
Start: 2018-04-03

## 2018-04-03 RX ORDER — FLUOROURACIL 50 MG/ML
850 INJECTION, SOLUTION INTRAVENOUS ONCE
Status: CANCELLED | OUTPATIENT
Start: 2018-04-03

## 2018-04-03 RX ORDER — ATROPINE SULFATE 1 MG/ML
0.4 INJECTION, SOLUTION INTRAMUSCULAR; INTRAVENOUS; SUBCUTANEOUS ONCE
Status: CANCELLED | OUTPATIENT
Start: 2018-04-03 | End: 2018-04-03

## 2018-04-03 RX ADMIN — DENOSUMAB 120 MG: 120 INJECTION SUBCUTANEOUS at 09:41

## 2018-04-03 RX ADMIN — FLUOROURACIL 850 MG: 50 INJECTION, SOLUTION INTRAVENOUS at 12:33

## 2018-04-03 RX ADMIN — IRINOTECAN HYDROCHLORIDE 400 MG: 100 INJECTION, SOLUTION INTRAVENOUS at 10:19

## 2018-04-03 RX ADMIN — DEXAMETHASONE SODIUM PHOSPHATE 10 MG: 10 INJECTION, SOLUTION INTRAMUSCULAR; INTRAVENOUS at 10:15

## 2018-04-03 RX ADMIN — BEVACIZUMAB 500 MG: 400 INJECTION, SOLUTION INTRAVENOUS at 09:26

## 2018-04-03 RX ADMIN — SODIUM CHLORIDE: 9 INJECTION, SOLUTION INTRAVENOUS at 09:25

## 2018-04-03 RX ADMIN — PALONOSETRON HYDROCHLORIDE 0.25 MG: 0.25 INJECTION INTRAVENOUS at 10:15

## 2018-04-03 RX ADMIN — LEUCOVORIN CALCIUM 850 MG: 350 INJECTION, POWDER, LYOPHILIZED, FOR SOLUTION INTRAMUSCULAR; INTRAVENOUS at 10:19

## 2018-04-05 ENCOUNTER — HOSPITAL ENCOUNTER (OUTPATIENT)
Dept: INFUSION THERAPY | Age: 68
Discharge: HOME | End: 2018-04-05
Payer: MEDICARE

## 2018-04-05 DIAGNOSIS — C78.7 RECTAL CANCER METASTASIZED TO LIVER (HCC): ICD-10-CM

## 2018-04-05 DIAGNOSIS — C20 RECTAL CANCER METASTASIZED TO LIVER (HCC): ICD-10-CM

## 2018-04-05 DIAGNOSIS — E83.51 HYPOCALCEMIA: ICD-10-CM

## 2018-04-05 PROCEDURE — 99214 OFFICE O/P EST MOD 30 MIN: CPT

## 2018-04-05 PROCEDURE — 6360000002 HC RX W HCPCS: Performed by: INTERNAL MEDICINE

## 2018-04-05 PROCEDURE — 2580000003 HC RX 258: Performed by: INTERNAL MEDICINE

## 2018-04-05 PROCEDURE — 96372 THER/PROPH/DIAG INJ SC/IM: CPT

## 2018-04-05 RX ORDER — HEPARIN SODIUM (PORCINE) LOCK FLUSH IV SOLN 100 UNIT/ML 100 UNIT/ML
500 SOLUTION INTRAVENOUS PRN
Status: CANCELLED | OUTPATIENT
Start: 2018-04-05

## 2018-04-05 RX ORDER — SODIUM CHLORIDE 0.9 % (FLUSH) 0.9 %
10 SYRINGE (ML) INJECTION PRN
Status: DISCONTINUED | OUTPATIENT
Start: 2018-04-05 | End: 2018-04-06 | Stop reason: HOSPADM

## 2018-04-05 RX ORDER — HEPARIN SODIUM (PORCINE) LOCK FLUSH IV SOLN 100 UNIT/ML 100 UNIT/ML
500 SOLUTION INTRAVENOUS PRN
Status: DISCONTINUED | OUTPATIENT
Start: 2018-04-05 | End: 2018-04-06 | Stop reason: HOSPADM

## 2018-04-05 RX ORDER — SODIUM CHLORIDE 0.9 % (FLUSH) 0.9 %
10 SYRINGE (ML) INJECTION PRN
Status: CANCELLED | OUTPATIENT
Start: 2018-04-05

## 2018-04-05 RX ADMIN — HEPARIN SODIUM (PORCINE) LOCK FLUSH IV SOLN 100 UNIT/ML 500 UNITS: 100 SOLUTION at 11:11

## 2018-04-05 RX ADMIN — PEGFILGRASTIM 6 MG: 6 INJECTION SUBCUTANEOUS at 11:15

## 2018-04-05 RX ADMIN — Medication 10 ML: at 11:10

## 2018-04-05 NOTE — PROGRESS NOTES
Presents to clinic for CADD pump removal. Port site appears normal. Denies problems/concerns. Received 250.7 ml of 5-FU & reservoir 9.3 of 5-FU. Port flushed with 10 ml. NSS followed by 5 ml. Heparin Rinse prior to de access. DSD to area. Tolerated well. Encouraged to call clinic with questions/concerns.

## 2018-04-17 ENCOUNTER — HOSPITAL ENCOUNTER (OUTPATIENT)
Dept: INFUSION THERAPY | Age: 68
Discharge: HOME | End: 2018-04-17
Payer: MEDICARE

## 2018-04-17 ENCOUNTER — OFFICE VISIT (OUTPATIENT)
Dept: ONCOLOGY | Age: 68
End: 2018-04-17
Payer: MEDICARE

## 2018-04-17 ENCOUNTER — TELEPHONE (OUTPATIENT)
Dept: INFUSION THERAPY | Age: 68
End: 2018-04-17

## 2018-04-17 VITALS
SYSTOLIC BLOOD PRESSURE: 123 MMHG | TEMPERATURE: 97.5 F | HEART RATE: 64 BPM | BODY MASS INDEX: 24.57 KG/M2 | DIASTOLIC BLOOD PRESSURE: 69 MMHG | WEIGHT: 185.4 LBS | HEIGHT: 73 IN | RESPIRATION RATE: 20 BRPM

## 2018-04-17 DIAGNOSIS — C78.7 RECTAL CANCER METASTASIZED TO LIVER (HCC): ICD-10-CM

## 2018-04-17 DIAGNOSIS — C18.9 MALIGNANT NEOPLASM OF COLON, UNSPECIFIED PART OF COLON (HCC): Primary | ICD-10-CM

## 2018-04-17 DIAGNOSIS — C20 RECTAL CANCER METASTASIZED TO LIVER (HCC): ICD-10-CM

## 2018-04-17 LAB
ALBUMIN SERPL-MCNC: 3.4 G/DL (ref 3.5–5.2)
ALP BLD-CCNC: 172 U/L (ref 40–129)
ALT SERPL-CCNC: 25 U/L (ref 0–40)
ANION GAP SERPL CALCULATED.3IONS-SCNC: 9 MMOL/L (ref 7–16)
AST SERPL-CCNC: 34 U/L (ref 0–39)
BASOPHILS ABSOLUTE: 0.04 E9/L (ref 0–0.2)
BASOPHILS RELATIVE PERCENT: 0.7 % (ref 0–2)
BILIRUB SERPL-MCNC: 0.6 MG/DL (ref 0–1.2)
BUN BLDV-MCNC: 7 MG/DL (ref 8–23)
CALCIUM SERPL-MCNC: 8.4 MG/DL (ref 8.6–10.2)
CEA: 5.7 NG/ML (ref 0–5.2)
CHLORIDE BLD-SCNC: 107 MMOL/L (ref 98–107)
CO2: 25 MMOL/L (ref 22–29)
CREAT SERPL-MCNC: 0.9 MG/DL (ref 0.7–1.2)
EOSINOPHILS ABSOLUTE: 0.09 E9/L (ref 0.05–0.5)
EOSINOPHILS RELATIVE PERCENT: 1.7 % (ref 0–6)
GFR AFRICAN AMERICAN: >60
GFR NON-AFRICAN AMERICAN: >60 ML/MIN/1.73
GLUCOSE BLD-MCNC: 100 MG/DL (ref 74–109)
HCT VFR BLD CALC: 28.6 % (ref 37–54)
HEMOGLOBIN: 9.2 G/DL (ref 12.5–16.5)
IMMATURE GRANULOCYTES #: 0.23 E9/L
IMMATURE GRANULOCYTES %: 4.3 % (ref 0–5)
LYMPHOCYTES ABSOLUTE: 0.61 E9/L (ref 1.5–4)
LYMPHOCYTES RELATIVE PERCENT: 11.4 % (ref 20–42)
MCH RBC QN AUTO: 33.2 PG (ref 26–35)
MCHC RBC AUTO-ENTMCNC: 32.2 % (ref 32–34.5)
MCV RBC AUTO: 103.2 FL (ref 80–99.9)
MONOCYTES ABSOLUTE: 0.35 E9/L (ref 0.1–0.95)
MONOCYTES RELATIVE PERCENT: 6.5 % (ref 2–12)
NEUTROPHILS ABSOLUTE: 4.05 E9/L (ref 1.8–7.3)
NEUTROPHILS RELATIVE PERCENT: 75.4 % (ref 43–80)
PDW BLD-RTO: 17.3 FL (ref 11.5–15)
PLATELET # BLD: 112 E9/L (ref 130–450)
PMV BLD AUTO: 10.9 FL (ref 7–12)
POTASSIUM SERPL-SCNC: 3.4 MMOL/L (ref 3.5–5)
RBC # BLD: 2.77 E12/L (ref 3.8–5.8)
SODIUM BLD-SCNC: 141 MMOL/L (ref 132–146)
TOTAL PROTEIN: 6 G/DL (ref 6.4–8.3)
WBC # BLD: 5.4 E9/L (ref 4.5–11.5)

## 2018-04-17 PROCEDURE — 82378 CARCINOEMBRYONIC ANTIGEN: CPT

## 2018-04-17 PROCEDURE — 80053 COMPREHEN METABOLIC PANEL: CPT

## 2018-04-17 PROCEDURE — 99214 OFFICE O/P EST MOD 30 MIN: CPT | Performed by: INTERNAL MEDICINE

## 2018-04-17 PROCEDURE — 85025 COMPLETE CBC W/AUTO DIFF WBC: CPT

## 2018-04-17 PROCEDURE — 99214 OFFICE O/P EST MOD 30 MIN: CPT

## 2018-04-24 ENCOUNTER — OFFICE VISIT (OUTPATIENT)
Dept: ONCOLOGY | Age: 68
End: 2018-04-24
Payer: MEDICARE

## 2018-04-24 ENCOUNTER — HOSPITAL ENCOUNTER (OUTPATIENT)
Dept: INFUSION THERAPY | Age: 68
Discharge: HOME | End: 2018-04-24
Payer: MEDICARE

## 2018-04-24 VITALS
TEMPERATURE: 97.8 F | SYSTOLIC BLOOD PRESSURE: 111 MMHG | BODY MASS INDEX: 26.14 KG/M2 | HEIGHT: 73 IN | RESPIRATION RATE: 20 BRPM | DIASTOLIC BLOOD PRESSURE: 70 MMHG | WEIGHT: 197.2 LBS | HEART RATE: 60 BPM

## 2018-04-24 VITALS — RESPIRATION RATE: 20 BRPM | DIASTOLIC BLOOD PRESSURE: 63 MMHG | HEART RATE: 57 BPM | SYSTOLIC BLOOD PRESSURE: 119 MMHG

## 2018-04-24 DIAGNOSIS — C78.7 RECTAL CANCER METASTASIZED TO LIVER (HCC): ICD-10-CM

## 2018-04-24 DIAGNOSIS — C20 RECTAL CANCER METASTASIZED TO LIVER (HCC): ICD-10-CM

## 2018-04-24 DIAGNOSIS — C20 RECTAL CANCER (HCC): Primary | ICD-10-CM

## 2018-04-24 LAB
ALBUMIN SERPL-MCNC: 3.2 G/DL (ref 3.5–5.2)
ALP BLD-CCNC: 186 U/L (ref 40–129)
ALT SERPL-CCNC: 22 U/L (ref 0–40)
ANION GAP SERPL CALCULATED.3IONS-SCNC: 9 MMOL/L (ref 7–16)
AST SERPL-CCNC: 32 U/L (ref 0–39)
BASOPHILS ABSOLUTE: 0.04 E9/L (ref 0–0.2)
BASOPHILS RELATIVE PERCENT: 0.6 % (ref 0–2)
BILIRUB SERPL-MCNC: 0.6 MG/DL (ref 0–1.2)
BUN BLDV-MCNC: 13 MG/DL (ref 8–23)
CALCIUM SERPL-MCNC: 9.2 MG/DL (ref 8.6–10.2)
CEA: 5.3 NG/ML (ref 0–5.2)
CHLORIDE BLD-SCNC: 107 MMOL/L (ref 98–107)
CO2: 25 MMOL/L (ref 22–29)
CREAT SERPL-MCNC: 0.9 MG/DL (ref 0.7–1.2)
EOSINOPHILS ABSOLUTE: 0.25 E9/L (ref 0.05–0.5)
EOSINOPHILS RELATIVE PERCENT: 4 % (ref 0–6)
GFR AFRICAN AMERICAN: >60
GFR NON-AFRICAN AMERICAN: >60 ML/MIN/1.73
GLUCOSE BLD-MCNC: 112 MG/DL (ref 74–109)
HCT VFR BLD CALC: 29.3 % (ref 37–54)
HEMOGLOBIN: 9.1 G/DL (ref 12.5–16.5)
IMMATURE GRANULOCYTES #: 0.06 E9/L
IMMATURE GRANULOCYTES %: 1 % (ref 0–5)
LYMPHOCYTES ABSOLUTE: 0.63 E9/L (ref 1.5–4)
LYMPHOCYTES RELATIVE PERCENT: 10.1 % (ref 20–42)
MCH RBC QN AUTO: 32.6 PG (ref 26–35)
MCHC RBC AUTO-ENTMCNC: 31.1 % (ref 32–34.5)
MCV RBC AUTO: 105 FL (ref 80–99.9)
MONOCYTES ABSOLUTE: 0.48 E9/L (ref 0.1–0.95)
MONOCYTES RELATIVE PERCENT: 7.7 % (ref 2–12)
NEUTROPHILS ABSOLUTE: 4.76 E9/L (ref 1.8–7.3)
NEUTROPHILS RELATIVE PERCENT: 76.6 % (ref 43–80)
PDW BLD-RTO: 17.3 FL (ref 11.5–15)
PLATELET # BLD: 101 E9/L (ref 130–450)
PMV BLD AUTO: 10.6 FL (ref 7–12)
POTASSIUM SERPL-SCNC: 4.3 MMOL/L (ref 3.5–5)
RBC # BLD: 2.79 E12/L (ref 3.8–5.8)
SODIUM BLD-SCNC: 141 MMOL/L (ref 132–146)
TOTAL PROTEIN: 5.6 G/DL (ref 6.4–8.3)
WBC # BLD: 6.2 E9/L (ref 4.5–11.5)

## 2018-04-24 PROCEDURE — 85025 COMPLETE CBC W/AUTO DIFF WBC: CPT

## 2018-04-24 PROCEDURE — 80053 COMPREHEN METABOLIC PANEL: CPT

## 2018-04-24 PROCEDURE — 96417 CHEMO IV INFUS EACH ADDL SEQ: CPT

## 2018-04-24 PROCEDURE — 96375 TX/PRO/DX INJ NEW DRUG ADDON: CPT

## 2018-04-24 PROCEDURE — 82378 CARCINOEMBRYONIC ANTIGEN: CPT

## 2018-04-24 PROCEDURE — 99214 OFFICE O/P EST MOD 30 MIN: CPT | Performed by: INTERNAL MEDICINE

## 2018-04-24 PROCEDURE — 6360000002 HC RX W HCPCS: Performed by: INTERNAL MEDICINE

## 2018-04-24 PROCEDURE — 96368 THER/DIAG CONCURRENT INF: CPT

## 2018-04-24 PROCEDURE — 96413 CHEMO IV INFUSION 1 HR: CPT

## 2018-04-24 PROCEDURE — 96415 CHEMO IV INFUSION ADDL HR: CPT

## 2018-04-24 PROCEDURE — 96411 CHEMO IV PUSH ADDL DRUG: CPT

## 2018-04-24 PROCEDURE — 2580000003 HC RX 258: Performed by: INTERNAL MEDICINE

## 2018-04-24 RX ORDER — SODIUM CHLORIDE 9 MG/ML
INJECTION, SOLUTION INTRAVENOUS CONTINUOUS
Status: DISCONTINUED | OUTPATIENT
Start: 2018-04-24 | End: 2018-04-25 | Stop reason: HOSPADM

## 2018-04-24 RX ORDER — HEPARIN SODIUM (PORCINE) LOCK FLUSH IV SOLN 100 UNIT/ML 100 UNIT/ML
500 SOLUTION INTRAVENOUS PRN
Status: CANCELLED | OUTPATIENT
Start: 2018-04-24

## 2018-04-24 RX ORDER — SODIUM CHLORIDE 9 MG/ML
INJECTION, SOLUTION INTRAVENOUS CONTINUOUS
Status: CANCELLED | OUTPATIENT
Start: 2018-04-24 | End: 2018-05-12

## 2018-04-24 RX ORDER — DIPHENHYDRAMINE HYDROCHLORIDE 50 MG/ML
50 INJECTION INTRAMUSCULAR; INTRAVENOUS ONCE
Status: CANCELLED | OUTPATIENT
Start: 2018-04-24 | End: 2018-04-24

## 2018-04-24 RX ORDER — SODIUM CHLORIDE 9 MG/ML
INJECTION, SOLUTION INTRAVENOUS CONTINUOUS
Status: CANCELLED | OUTPATIENT
Start: 2018-04-24

## 2018-04-24 RX ORDER — PALONOSETRON 0.05 MG/ML
0.25 INJECTION, SOLUTION INTRAVENOUS ONCE
Status: CANCELLED | OUTPATIENT
Start: 2018-04-24

## 2018-04-24 RX ORDER — SODIUM CHLORIDE 0.9 % (FLUSH) 0.9 %
10 SYRINGE (ML) INJECTION PRN
Status: CANCELLED | OUTPATIENT
Start: 2018-04-24

## 2018-04-24 RX ORDER — DEXAMETHASONE SODIUM PHOSPHATE 10 MG/ML
10 INJECTION, SOLUTION INTRAMUSCULAR; INTRAVENOUS ONCE
Status: COMPLETED | OUTPATIENT
Start: 2018-04-24 | End: 2018-04-24

## 2018-04-24 RX ORDER — FLUOROURACIL 50 MG/ML
850 INJECTION, SOLUTION INTRAVENOUS ONCE
Status: COMPLETED | OUTPATIENT
Start: 2018-04-24 | End: 2018-04-24

## 2018-04-24 RX ORDER — 0.9 % SODIUM CHLORIDE 0.9 %
10 VIAL (ML) INJECTION ONCE
Status: CANCELLED | OUTPATIENT
Start: 2018-04-24 | End: 2018-04-24

## 2018-04-24 RX ORDER — FLUOROURACIL 50 MG/ML
850 INJECTION, SOLUTION INTRAVENOUS ONCE
Status: CANCELLED | OUTPATIENT
Start: 2018-04-24

## 2018-04-24 RX ORDER — ATROPINE SULFATE 1 MG/ML
0.4 INJECTION, SOLUTION INTRAMUSCULAR; INTRAVENOUS; SUBCUTANEOUS ONCE
Status: CANCELLED | OUTPATIENT
Start: 2018-04-24 | End: 2018-04-24

## 2018-04-24 RX ORDER — METHYLPREDNISOLONE SODIUM SUCCINATE 125 MG/2ML
125 INJECTION, POWDER, LYOPHILIZED, FOR SOLUTION INTRAMUSCULAR; INTRAVENOUS ONCE
Status: CANCELLED | OUTPATIENT
Start: 2018-04-24 | End: 2018-04-24

## 2018-04-24 RX ORDER — HEPARIN SODIUM (PORCINE) LOCK FLUSH IV SOLN 100 UNIT/ML 100 UNIT/ML
500 SOLUTION INTRAVENOUS PRN
Status: DISCONTINUED | OUTPATIENT
Start: 2018-04-24 | End: 2018-04-25 | Stop reason: HOSPADM

## 2018-04-24 RX ORDER — PALONOSETRON 0.05 MG/ML
0.25 INJECTION, SOLUTION INTRAVENOUS ONCE
Status: COMPLETED | OUTPATIENT
Start: 2018-04-24 | End: 2018-04-24

## 2018-04-24 RX ADMIN — FLUOROURACIL 850 MG: 50 INJECTION, SOLUTION INTRAVENOUS at 13:28

## 2018-04-24 RX ADMIN — LEUCOVORIN CALCIUM 850 MG: 350 INJECTION, POWDER, LYOPHILIZED, FOR SOLUTION INTRAMUSCULAR; INTRAVENOUS at 11:49

## 2018-04-24 RX ADMIN — BEVACIZUMAB 500 MG: 400 INJECTION, SOLUTION INTRAVENOUS at 11:10

## 2018-04-24 RX ADMIN — PALONOSETRON HYDROCHLORIDE 0.25 MG: 0.25 INJECTION INTRAVENOUS at 10:55

## 2018-04-24 RX ADMIN — Medication 500 UNITS: at 13:38

## 2018-04-24 RX ADMIN — SODIUM CHLORIDE: 9 INJECTION, SOLUTION INTRAVENOUS at 10:55

## 2018-04-24 RX ADMIN — DEXAMETHASONE SODIUM PHOSPHATE 10 MG: 10 INJECTION, SOLUTION INTRAMUSCULAR; INTRAVENOUS at 10:55

## 2018-04-24 RX ADMIN — IRINOTECAN HYDROCHLORIDE 400 MG: 100 INJECTION, SOLUTION INTRAVENOUS at 11:49

## 2018-04-24 NOTE — PROGRESS NOTES
Pt left with [facility] accessed so he can receive his 5FU pump hook up at home with [employer] [facility].

## 2018-04-26 ENCOUNTER — TELEPHONE (OUTPATIENT)
Dept: INFUSION THERAPY | Age: 68
End: 2018-04-26

## 2018-04-26 ENCOUNTER — HOSPITAL ENCOUNTER (OUTPATIENT)
Dept: INFUSION THERAPY | Age: 68
Discharge: HOME | End: 2018-04-26
Payer: MEDICARE

## 2018-04-26 DIAGNOSIS — C78.7 RECTAL CANCER METASTASIZED TO LIVER (HCC): ICD-10-CM

## 2018-04-26 DIAGNOSIS — C20 RECTAL CANCER METASTASIZED TO LIVER (HCC): ICD-10-CM

## 2018-04-26 PROCEDURE — 96372 THER/PROPH/DIAG INJ SC/IM: CPT

## 2018-04-26 PROCEDURE — 6360000002 HC RX W HCPCS: Performed by: INTERNAL MEDICINE

## 2018-04-26 RX ORDER — HYDROCHLOROTHIAZIDE 25 MG/1
25 TABLET ORAL DAILY
Qty: 30 TABLET | Refills: 3 | Status: SHIPPED | OUTPATIENT
Start: 2018-04-26

## 2018-04-26 RX ADMIN — PEGFILGRASTIM 6 MG: 6 INJECTION SUBCUTANEOUS at 14:22

## 2018-04-26 NOTE — PROGRESS NOTES
Patient complained about legs being swollen, which they were. Ignacia Perez took care of him 2 days ago and stated they're more swollen today then before. Jada  informed and called Paula Np for Dr. Felicitas Gray. Paula will call patient at home, patient verbalized understanding.

## 2018-05-01 ENCOUNTER — HOSPITAL ENCOUNTER (OUTPATIENT)
Dept: INFUSION THERAPY | Age: 68
Discharge: HOME | End: 2018-05-01
Payer: MEDICARE

## 2018-05-01 VITALS — DIASTOLIC BLOOD PRESSURE: 59 MMHG | HEART RATE: 66 BPM | SYSTOLIC BLOOD PRESSURE: 127 MMHG | RESPIRATION RATE: 20 BRPM

## 2018-05-01 DIAGNOSIS — C20 RECTAL CANCER METASTASIZED TO LIVER (HCC): ICD-10-CM

## 2018-05-01 DIAGNOSIS — C78.7 RECTAL CANCER METASTASIZED TO LIVER (HCC): ICD-10-CM

## 2018-05-01 PROCEDURE — 96372 THER/PROPH/DIAG INJ SC/IM: CPT

## 2018-05-01 PROCEDURE — 6360000002 HC RX W HCPCS: Performed by: INTERNAL MEDICINE

## 2018-05-01 RX ADMIN — DENOSUMAB 120 MG: 120 INJECTION SUBCUTANEOUS at 10:56

## 2018-05-01 NOTE — PROGRESS NOTES
Patient presents to clinic today for Xgeva injection. Patient's labs monitored, specifically, Calcium level, to ensure no increased risk of hypocalcemia with administration of the medication. Patient's calcium level from 4/24/18 was 9.2 mg/dL. Patient received therapy and will continue to be monitored prior to each dose.     [place], [place] 5/1/2018 10:38 AM

## 2018-05-08 ENCOUNTER — OFFICE VISIT (OUTPATIENT)
Dept: ONCOLOGY | Age: 68
End: 2018-05-08
Payer: MEDICARE

## 2018-05-08 ENCOUNTER — HOSPITAL ENCOUNTER (OUTPATIENT)
Dept: INFUSION THERAPY | Age: 68
Discharge: HOME | End: 2018-05-08
Payer: MEDICARE

## 2018-05-08 VITALS
TEMPERATURE: 98 F | HEIGHT: 73 IN | HEART RATE: 59 BPM | WEIGHT: 181 LBS | RESPIRATION RATE: 20 BRPM | SYSTOLIC BLOOD PRESSURE: 117 MMHG | DIASTOLIC BLOOD PRESSURE: 60 MMHG | BODY MASS INDEX: 23.99 KG/M2

## 2018-05-08 VITALS — RESPIRATION RATE: 20 BRPM | HEART RATE: 65 BPM | DIASTOLIC BLOOD PRESSURE: 68 MMHG | SYSTOLIC BLOOD PRESSURE: 115 MMHG

## 2018-05-08 DIAGNOSIS — C19 COLORECTAL CANCER (HCC): Primary | ICD-10-CM

## 2018-05-08 DIAGNOSIS — C20 RECTAL CANCER METASTASIZED TO LIVER (HCC): ICD-10-CM

## 2018-05-08 DIAGNOSIS — C78.7 RECTAL CANCER METASTASIZED TO LIVER (HCC): ICD-10-CM

## 2018-05-08 LAB
ALBUMIN SERPL-MCNC: 3.8 G/DL (ref 3.5–5.2)
ALP BLD-CCNC: 194 U/L (ref 40–129)
ALT SERPL-CCNC: 20 U/L (ref 0–40)
ANION GAP SERPL CALCULATED.3IONS-SCNC: 11 MMOL/L (ref 7–16)
AST SERPL-CCNC: 25 U/L (ref 0–39)
BASOPHILS ABSOLUTE: 0.03 E9/L (ref 0–0.2)
BASOPHILS RELATIVE PERCENT: 0.5 % (ref 0–2)
BILIRUB SERPL-MCNC: 0.8 MG/DL (ref 0–1.2)
BUN BLDV-MCNC: 14 MG/DL (ref 8–23)
CALCIUM SERPL-MCNC: 8 MG/DL (ref 8.6–10.2)
CEA: 5.4 NG/ML (ref 0–5.2)
CHLORIDE BLD-SCNC: 105 MMOL/L (ref 98–107)
CO2: 26 MMOL/L (ref 22–29)
CREAT SERPL-MCNC: 0.8 MG/DL (ref 0.7–1.2)
EOSINOPHILS ABSOLUTE: 0.41 E9/L (ref 0.05–0.5)
EOSINOPHILS RELATIVE PERCENT: 6.6 % (ref 0–6)
GFR AFRICAN AMERICAN: >60
GFR NON-AFRICAN AMERICAN: >60 ML/MIN/1.73
GLUCOSE BLD-MCNC: 115 MG/DL (ref 74–109)
HCT VFR BLD CALC: 29.7 % (ref 37–54)
HEMOGLOBIN: 9.6 G/DL (ref 12.5–16.5)
IMMATURE GRANULOCYTES #: 0.05 E9/L
IMMATURE GRANULOCYTES %: 0.8 % (ref 0–5)
LYMPHOCYTES ABSOLUTE: 0.68 E9/L (ref 1.5–4)
LYMPHOCYTES RELATIVE PERCENT: 11 % (ref 20–42)
MCH RBC QN AUTO: 33.2 PG (ref 26–35)
MCHC RBC AUTO-ENTMCNC: 32.3 % (ref 32–34.5)
MCV RBC AUTO: 102.8 FL (ref 80–99.9)
MONOCYTES ABSOLUTE: 0.35 E9/L (ref 0.1–0.95)
MONOCYTES RELATIVE PERCENT: 5.6 % (ref 2–12)
NEUTROPHILS ABSOLUTE: 4.68 E9/L (ref 1.8–7.3)
NEUTROPHILS RELATIVE PERCENT: 75.5 % (ref 43–80)
PDW BLD-RTO: 17.2 FL (ref 11.5–15)
PLATELET # BLD: 114 E9/L (ref 130–450)
PMV BLD AUTO: 11.1 FL (ref 7–12)
POTASSIUM SERPL-SCNC: 3.4 MMOL/L (ref 3.5–5)
RBC # BLD: 2.89 E12/L (ref 3.8–5.8)
SODIUM BLD-SCNC: 142 MMOL/L (ref 132–146)
TOTAL PROTEIN: 6.4 G/DL (ref 6.4–8.3)
WBC # BLD: 6.2 E9/L (ref 4.5–11.5)

## 2018-05-08 PROCEDURE — 2580000003 HC RX 258

## 2018-05-08 PROCEDURE — 96375 TX/PRO/DX INJ NEW DRUG ADDON: CPT

## 2018-05-08 PROCEDURE — 2580000003 HC RX 258: Performed by: INTERNAL MEDICINE

## 2018-05-08 PROCEDURE — 99214 OFFICE O/P EST MOD 30 MIN: CPT | Performed by: INTERNAL MEDICINE

## 2018-05-08 PROCEDURE — 6360000002 HC RX W HCPCS

## 2018-05-08 PROCEDURE — 80053 COMPREHEN METABOLIC PANEL: CPT

## 2018-05-08 PROCEDURE — 96417 CHEMO IV INFUS EACH ADDL SEQ: CPT

## 2018-05-08 PROCEDURE — 96415 CHEMO IV INFUSION ADDL HR: CPT

## 2018-05-08 PROCEDURE — 6360000002 HC RX W HCPCS: Performed by: INTERNAL MEDICINE

## 2018-05-08 PROCEDURE — 96413 CHEMO IV INFUSION 1 HR: CPT

## 2018-05-08 PROCEDURE — 82378 CARCINOEMBRYONIC ANTIGEN: CPT

## 2018-05-08 PROCEDURE — 85025 COMPLETE CBC W/AUTO DIFF WBC: CPT

## 2018-05-08 PROCEDURE — 96368 THER/DIAG CONCURRENT INF: CPT

## 2018-05-08 RX ORDER — FLUOROURACIL 50 MG/ML
850 INJECTION, SOLUTION INTRAVENOUS ONCE
Status: CANCELLED | OUTPATIENT
Start: 2018-05-08

## 2018-05-08 RX ORDER — 0.9 % SODIUM CHLORIDE 0.9 %
10 VIAL (ML) INJECTION ONCE
Status: CANCELLED | OUTPATIENT
Start: 2018-05-08 | End: 2018-05-08

## 2018-05-08 RX ORDER — SODIUM CHLORIDE 9 MG/ML
INJECTION, SOLUTION INTRAVENOUS CONTINUOUS
Status: DISCONTINUED | OUTPATIENT
Start: 2018-05-08 | End: 2018-05-09 | Stop reason: HOSPADM

## 2018-05-08 RX ORDER — ATROPINE SULFATE 1 MG/ML
0.4 INJECTION, SOLUTION INTRAMUSCULAR; INTRAVENOUS; SUBCUTANEOUS ONCE
Status: CANCELLED | OUTPATIENT
Start: 2018-05-08 | End: 2018-05-08

## 2018-05-08 RX ORDER — FLUOROURACIL 50 MG/ML
850 INJECTION, SOLUTION INTRAVENOUS ONCE
Status: COMPLETED | OUTPATIENT
Start: 2018-05-08 | End: 2018-05-08

## 2018-05-08 RX ORDER — HEPARIN SODIUM (PORCINE) LOCK FLUSH IV SOLN 100 UNIT/ML 100 UNIT/ML
500 SOLUTION INTRAVENOUS PRN
Status: CANCELLED | OUTPATIENT
Start: 2018-05-08

## 2018-05-08 RX ORDER — METHYLPREDNISOLONE SODIUM SUCCINATE 125 MG/2ML
125 INJECTION, POWDER, LYOPHILIZED, FOR SOLUTION INTRAMUSCULAR; INTRAVENOUS ONCE
Status: CANCELLED | OUTPATIENT
Start: 2018-05-08 | End: 2018-05-08

## 2018-05-08 RX ORDER — SODIUM CHLORIDE 9 MG/ML
INJECTION, SOLUTION INTRAVENOUS CONTINUOUS
Status: CANCELLED | OUTPATIENT
Start: 2018-05-08

## 2018-05-08 RX ORDER — SODIUM CHLORIDE 9 MG/ML
INJECTION, SOLUTION INTRAVENOUS CONTINUOUS
Status: CANCELLED | OUTPATIENT
Start: 2018-05-08 | End: 2018-05-25

## 2018-05-08 RX ORDER — DIPHENHYDRAMINE HYDROCHLORIDE 50 MG/ML
50 INJECTION INTRAMUSCULAR; INTRAVENOUS ONCE
Status: CANCELLED | OUTPATIENT
Start: 2018-05-08 | End: 2018-05-08

## 2018-05-08 RX ORDER — DEXAMETHASONE SODIUM PHOSPHATE 10 MG/ML
10 INJECTION, SOLUTION INTRAMUSCULAR; INTRAVENOUS ONCE
Status: COMPLETED | OUTPATIENT
Start: 2018-05-08 | End: 2018-05-08

## 2018-05-08 RX ORDER — PALONOSETRON 0.05 MG/ML
0.25 INJECTION, SOLUTION INTRAVENOUS ONCE
Status: CANCELLED | OUTPATIENT
Start: 2018-05-08

## 2018-05-08 RX ORDER — PALONOSETRON 0.05 MG/ML
0.25 INJECTION, SOLUTION INTRAVENOUS ONCE
Status: COMPLETED | OUTPATIENT
Start: 2018-05-08 | End: 2018-05-08

## 2018-05-08 RX ORDER — SODIUM CHLORIDE 0.9 % (FLUSH) 0.9 %
10 SYRINGE (ML) INJECTION PRN
Status: CANCELLED | OUTPATIENT
Start: 2018-05-08

## 2018-05-08 RX ADMIN — IRINOTECAN HYDROCHLORIDE 400 MG: 100 INJECTION, SOLUTION INTRAVENOUS at 11:03

## 2018-05-08 RX ADMIN — BEVACIZUMAB 500 MG: 400 INJECTION, SOLUTION INTRAVENOUS at 10:09

## 2018-05-08 RX ADMIN — DEXAMETHASONE SODIUM PHOSPHATE 10 MG: 10 INJECTION, SOLUTION INTRAMUSCULAR; INTRAVENOUS at 10:48

## 2018-05-08 RX ADMIN — SODIUM CHLORIDE: 9 INJECTION, SOLUTION INTRAVENOUS at 10:09

## 2018-05-08 RX ADMIN — FLUOROURACIL 850 MG: 50 INJECTION, SOLUTION INTRAVENOUS at 13:06

## 2018-05-08 RX ADMIN — PALONOSETRON 0.25 MG: 0.05 INJECTION, SOLUTION INTRAVENOUS at 10:48

## 2018-05-10 ENCOUNTER — APPOINTMENT (OUTPATIENT)
Dept: INFUSION THERAPY | Age: 68
End: 2018-05-10
Payer: MEDICARE

## 2018-05-10 ENCOUNTER — HOSPITAL ENCOUNTER (OUTPATIENT)
Dept: INFUSION THERAPY | Age: 68
Discharge: HOME | End: 2018-05-10
Payer: MEDICARE

## 2018-05-10 DIAGNOSIS — C20 RECTAL CANCER METASTASIZED TO LIVER (HCC): ICD-10-CM

## 2018-05-10 DIAGNOSIS — C78.7 RECTAL CANCER METASTASIZED TO LIVER (HCC): ICD-10-CM

## 2018-05-10 PROCEDURE — 6360000002 HC RX W HCPCS: Performed by: INTERNAL MEDICINE

## 2018-05-10 PROCEDURE — 96372 THER/PROPH/DIAG INJ SC/IM: CPT

## 2018-05-10 RX ADMIN — PEGFILGRASTIM 6 MG: 6 INJECTION SUBCUTANEOUS at 15:07

## 2018-05-22 ENCOUNTER — HOSPITAL ENCOUNTER (OUTPATIENT)
Dept: INFUSION THERAPY | Age: 68
Discharge: HOME | End: 2018-05-22
Payer: MEDICARE

## 2018-05-22 ENCOUNTER — OFFICE VISIT (OUTPATIENT)
Dept: ONCOLOGY | Age: 68
End: 2018-05-22
Payer: MEDICARE

## 2018-05-22 VITALS
BODY MASS INDEX: 24.06 KG/M2 | RESPIRATION RATE: 18 BRPM | WEIGHT: 181.5 LBS | SYSTOLIC BLOOD PRESSURE: 120 MMHG | HEART RATE: 60 BPM | HEIGHT: 73 IN | TEMPERATURE: 97.6 F | DIASTOLIC BLOOD PRESSURE: 63 MMHG

## 2018-05-22 DIAGNOSIS — C78.7 RECTAL CANCER METASTASIZED TO LIVER (HCC): Primary | ICD-10-CM

## 2018-05-22 DIAGNOSIS — C20 RECTAL CANCER METASTASIZED TO LIVER (HCC): ICD-10-CM

## 2018-05-22 DIAGNOSIS — C20 RECTAL CANCER METASTASIZED TO LIVER (HCC): Primary | ICD-10-CM

## 2018-05-22 DIAGNOSIS — C78.7 RECTAL CANCER METASTASIZED TO LIVER (HCC): ICD-10-CM

## 2018-05-22 DIAGNOSIS — E87.6 HYPOKALEMIA: Primary | ICD-10-CM

## 2018-05-22 DIAGNOSIS — E83.51 HYPOCALCEMIA: ICD-10-CM

## 2018-05-22 LAB
ALBUMIN SERPL-MCNC: 3.6 G/DL (ref 3.5–5.2)
ALP BLD-CCNC: 159 U/L (ref 40–129)
ALT SERPL-CCNC: 21 U/L (ref 0–40)
ANION GAP SERPL CALCULATED.3IONS-SCNC: 11 MMOL/L (ref 7–16)
ANISOCYTOSIS: ABNORMAL
AST SERPL-CCNC: 27 U/L (ref 0–39)
BASOPHILS ABSOLUTE: 0 E9/L (ref 0–0.2)
BASOPHILS RELATIVE PERCENT: 0.5 % (ref 0–2)
BILIRUB SERPL-MCNC: 0.6 MG/DL (ref 0–1.2)
BUN BLDV-MCNC: 15 MG/DL (ref 8–23)
CALCIUM SERPL-MCNC: 8.7 MG/DL (ref 8.6–10.2)
CEA: 6.1 NG/ML (ref 0–5.2)
CHLORIDE BLD-SCNC: 106 MMOL/L (ref 98–107)
CO2: 27 MMOL/L (ref 22–29)
CREAT SERPL-MCNC: 0.9 MG/DL (ref 0.7–1.2)
EOSINOPHILS ABSOLUTE: 0.07 E9/L (ref 0.05–0.5)
EOSINOPHILS RELATIVE PERCENT: 2 % (ref 0–6)
GFR AFRICAN AMERICAN: >60
GFR NON-AFRICAN AMERICAN: >60 ML/MIN/1.73
GLUCOSE BLD-MCNC: 146 MG/DL (ref 74–109)
HCT VFR BLD CALC: 28.3 % (ref 37–54)
HEMOGLOBIN: 9.2 G/DL (ref 12.5–16.5)
LYMPHOCYTES ABSOLUTE: 0.37 E9/L (ref 1.5–4)
LYMPHOCYTES RELATIVE PERCENT: 10.1 % (ref 20–42)
MCH RBC QN AUTO: 32.7 PG (ref 26–35)
MCHC RBC AUTO-ENTMCNC: 32.5 % (ref 32–34.5)
MCV RBC AUTO: 100.7 FL (ref 80–99.9)
MONOCYTES ABSOLUTE: 0.18 E9/L (ref 0.1–0.95)
MONOCYTES RELATIVE PERCENT: 5 % (ref 2–12)
MYELOCYTE PERCENT: 0.5 % (ref 0–0)
NEUTROPHILS ABSOLUTE: 3.07 E9/L (ref 1.8–7.3)
NEUTROPHILS RELATIVE PERCENT: 82.4 % (ref 43–80)
OVALOCYTES: ABNORMAL
PDW BLD-RTO: 16.4 FL (ref 11.5–15)
PLATELET # BLD: 96 E9/L (ref 130–450)
PLATELET CONFIRMATION: NORMAL
PMV BLD AUTO: 9.2 FL (ref 7–12)
POIKILOCYTES: ABNORMAL
POLYCHROMASIA: ABNORMAL
POTASSIUM SERPL-SCNC: 3 MMOL/L (ref 3.5–5)
RBC # BLD: 2.81 E12/L (ref 3.8–5.8)
SODIUM BLD-SCNC: 144 MMOL/L (ref 132–146)
TEAR DROP CELLS: ABNORMAL
TOTAL PROTEIN: 6.2 G/DL (ref 6.4–8.3)
WBC # BLD: 3.7 E9/L (ref 4.5–11.5)

## 2018-05-22 PROCEDURE — 6360000002 HC RX W HCPCS

## 2018-05-22 PROCEDURE — 80053 COMPREHEN METABOLIC PANEL: CPT

## 2018-05-22 PROCEDURE — 2580000003 HC RX 258

## 2018-05-22 PROCEDURE — 96415 CHEMO IV INFUSION ADDL HR: CPT

## 2018-05-22 PROCEDURE — 96411 CHEMO IV PUSH ADDL DRUG: CPT

## 2018-05-22 PROCEDURE — 85025 COMPLETE CBC W/AUTO DIFF WBC: CPT

## 2018-05-22 PROCEDURE — 6360000002 HC RX W HCPCS: Performed by: INTERNAL MEDICINE

## 2018-05-22 PROCEDURE — 99214 OFFICE O/P EST MOD 30 MIN: CPT | Performed by: INTERNAL MEDICINE

## 2018-05-22 PROCEDURE — 82378 CARCINOEMBRYONIC ANTIGEN: CPT

## 2018-05-22 PROCEDURE — 96375 TX/PRO/DX INJ NEW DRUG ADDON: CPT

## 2018-05-22 PROCEDURE — 2580000003 HC RX 258: Performed by: INTERNAL MEDICINE

## 2018-05-22 PROCEDURE — 96413 CHEMO IV INFUSION 1 HR: CPT

## 2018-05-22 PROCEDURE — 96368 THER/DIAG CONCURRENT INF: CPT

## 2018-05-22 PROCEDURE — 96417 CHEMO IV INFUS EACH ADDL SEQ: CPT

## 2018-05-22 RX ORDER — 0.9 % SODIUM CHLORIDE 0.9 %
10 VIAL (ML) INJECTION ONCE
Status: CANCELLED | OUTPATIENT
Start: 2018-05-22 | End: 2018-05-22

## 2018-05-22 RX ORDER — SODIUM CHLORIDE 0.9 % (FLUSH) 0.9 %
10 SYRINGE (ML) INJECTION PRN
Status: CANCELLED | OUTPATIENT
Start: 2018-05-22

## 2018-05-22 RX ORDER — PALONOSETRON 0.05 MG/ML
0.25 INJECTION, SOLUTION INTRAVENOUS ONCE
Status: COMPLETED | OUTPATIENT
Start: 2018-05-22 | End: 2018-05-22

## 2018-05-22 RX ORDER — FLUOROURACIL 50 MG/ML
850 INJECTION, SOLUTION INTRAVENOUS ONCE
Status: CANCELLED | OUTPATIENT
Start: 2018-05-22

## 2018-05-22 RX ORDER — HEPARIN SODIUM (PORCINE) LOCK FLUSH IV SOLN 100 UNIT/ML 100 UNIT/ML
500 SOLUTION INTRAVENOUS PRN
Status: CANCELLED | OUTPATIENT
Start: 2018-05-22

## 2018-05-22 RX ORDER — POTASSIUM CHLORIDE 20 MEQ/1
40 TABLET, EXTENDED RELEASE ORAL DAILY
Qty: 6 TABLET | Refills: 0 | Status: SHIPPED | OUTPATIENT
Start: 2018-05-22 | End: 2018-06-05

## 2018-05-22 RX ORDER — PALONOSETRON 0.05 MG/ML
0.25 INJECTION, SOLUTION INTRAVENOUS ONCE
Status: CANCELLED | OUTPATIENT
Start: 2018-05-22

## 2018-05-22 RX ORDER — DIPHENHYDRAMINE HYDROCHLORIDE 50 MG/ML
50 INJECTION INTRAMUSCULAR; INTRAVENOUS ONCE
Status: CANCELLED | OUTPATIENT
Start: 2018-05-22 | End: 2018-05-22

## 2018-05-22 RX ORDER — METHYLPREDNISOLONE SODIUM SUCCINATE 125 MG/2ML
125 INJECTION, POWDER, LYOPHILIZED, FOR SOLUTION INTRAMUSCULAR; INTRAVENOUS ONCE
Status: CANCELLED | OUTPATIENT
Start: 2018-05-22 | End: 2018-05-22

## 2018-05-22 RX ORDER — ATROPINE SULFATE 1 MG/ML
0.4 INJECTION, SOLUTION INTRAMUSCULAR; INTRAVENOUS; SUBCUTANEOUS ONCE
Status: CANCELLED | OUTPATIENT
Start: 2018-05-22 | End: 2018-05-22

## 2018-05-22 RX ORDER — HEPARIN SODIUM (PORCINE) LOCK FLUSH IV SOLN 100 UNIT/ML 100 UNIT/ML
500 SOLUTION INTRAVENOUS PRN
Status: DISCONTINUED | OUTPATIENT
Start: 2018-05-22 | End: 2018-05-23 | Stop reason: HOSPADM

## 2018-05-22 RX ORDER — SODIUM CHLORIDE 9 MG/ML
INJECTION, SOLUTION INTRAVENOUS CONTINUOUS
Status: DISCONTINUED | OUTPATIENT
Start: 2018-05-22 | End: 2018-05-23 | Stop reason: HOSPADM

## 2018-05-22 RX ORDER — SODIUM CHLORIDE 9 MG/ML
INJECTION, SOLUTION INTRAVENOUS CONTINUOUS
Status: CANCELLED | OUTPATIENT
Start: 2018-05-22 | End: 2018-06-08

## 2018-05-22 RX ORDER — DEXAMETHASONE SODIUM PHOSPHATE 10 MG/ML
10 INJECTION, SOLUTION INTRAMUSCULAR; INTRAVENOUS ONCE
Status: COMPLETED | OUTPATIENT
Start: 2018-05-22 | End: 2018-05-22

## 2018-05-22 RX ORDER — SODIUM CHLORIDE 9 MG/ML
INJECTION, SOLUTION INTRAVENOUS CONTINUOUS
Status: CANCELLED | OUTPATIENT
Start: 2018-05-22

## 2018-05-22 RX ORDER — FLUOROURACIL 50 MG/ML
850 INJECTION, SOLUTION INTRAVENOUS ONCE
Status: COMPLETED | OUTPATIENT
Start: 2018-05-22 | End: 2018-05-22

## 2018-05-22 RX ADMIN — SODIUM CHLORIDE: 9 INJECTION, SOLUTION INTRAVENOUS at 10:32

## 2018-05-22 RX ADMIN — FLUOROURACIL 850 MG: 50 INJECTION, SOLUTION INTRAVENOUS at 13:22

## 2018-05-22 RX ADMIN — SODIUM CHLORIDE, PRESERVATIVE FREE 500 UNITS: 5 INJECTION INTRAVENOUS at 13:27

## 2018-05-22 RX ADMIN — BEVACIZUMAB 500 MG: 400 INJECTION, SOLUTION INTRAVENOUS at 10:34

## 2018-05-22 RX ADMIN — IRINOTECAN HYDROCHLORIDE 400 MG: 100 INJECTION, SOLUTION INTRAVENOUS at 11:35

## 2018-05-22 RX ADMIN — DEXAMETHASONE SODIUM PHOSPHATE 10 MG: 10 INJECTION, SOLUTION INTRAMUSCULAR; INTRAVENOUS at 11:17

## 2018-05-22 RX ADMIN — PALONOSETRON HYDROCHLORIDE 0.25 MG: 0.25 INJECTION INTRAVENOUS at 11:12

## 2018-05-23 ENCOUNTER — TELEPHONE (OUTPATIENT)
Dept: INFUSION THERAPY | Age: 68
End: 2018-05-23

## 2018-05-23 NOTE — TELEPHONE ENCOUNTER
Called patient, left message, to return my call for CT scan (chest, abdomen/pelvis) appointment information. Patient is to arrive on 5/31/18 at [address] [place] [address] registration desk at 11:00 am, will then report to radiology afterward. Patient is to be NPO (nothing to eat or drink) beginning at 10:00 am that morning (5/31/18). He can take his medication with small amount of water. Will await his return phone call.

## 2018-05-24 ENCOUNTER — HOSPITAL ENCOUNTER (OUTPATIENT)
Dept: INFUSION THERAPY | Age: 68
Discharge: HOME | End: 2018-05-24
Payer: MEDICARE

## 2018-05-24 DIAGNOSIS — C20 RECTAL CANCER METASTASIZED TO LIVER (HCC): ICD-10-CM

## 2018-05-24 DIAGNOSIS — C78.7 RECTAL CANCER METASTASIZED TO LIVER (HCC): ICD-10-CM

## 2018-05-24 PROCEDURE — 6360000002 HC RX W HCPCS: Performed by: INTERNAL MEDICINE

## 2018-05-24 PROCEDURE — 96372 THER/PROPH/DIAG INJ SC/IM: CPT

## 2018-05-24 RX ADMIN — PEGFILGRASTIM 6 MG: 6 INJECTION SUBCUTANEOUS at 13:41

## 2018-05-29 ENCOUNTER — HOSPITAL ENCOUNTER (OUTPATIENT)
Dept: INFUSION THERAPY | Age: 68
Discharge: HOME | End: 2018-05-29
Payer: MEDICARE

## 2018-05-29 DIAGNOSIS — C79.51 BONE METASTASIS (HCC): ICD-10-CM

## 2018-05-29 DIAGNOSIS — C78.7 RECTAL CANCER METASTASIZED TO LIVER (HCC): ICD-10-CM

## 2018-05-29 DIAGNOSIS — C20 RECTAL CANCER METASTASIZED TO LIVER (HCC): ICD-10-CM

## 2018-05-29 PROCEDURE — 6360000002 HC RX W HCPCS: Performed by: INTERNAL MEDICINE

## 2018-05-29 PROCEDURE — 96372 THER/PROPH/DIAG INJ SC/IM: CPT

## 2018-05-29 RX ADMIN — DENOSUMAB 120 MG: 120 INJECTION SUBCUTANEOUS at 11:22

## 2018-05-29 NOTE — PROGRESS NOTES
Patient presents to clinic today for Xgeva injection. Patient's labs monitored, specifically, Calcium level,  to ensure no increased risk of hypocalcemia with administration of the medication. Patient's calcium level from 5/22/18 was 8.7 mg/dL. Patient received therapy and will continue to be monitored prior to each dose.     Sherif Whaley, [place] 5/29/2018 10:58 AM

## 2018-05-31 ENCOUNTER — HOSPITAL ENCOUNTER (OUTPATIENT)
Dept: CT IMAGING | Age: 68
Discharge: HOME | End: 2018-05-31
Payer: MEDICARE

## 2018-05-31 DIAGNOSIS — C20 RECTAL CANCER METASTASIZED TO LIVER (HCC): ICD-10-CM

## 2018-05-31 DIAGNOSIS — C78.7 RECTAL CANCER METASTASIZED TO LIVER (HCC): ICD-10-CM

## 2018-05-31 PROCEDURE — 74177 CT ABD & PELVIS W/CONTRAST: CPT

## 2018-05-31 PROCEDURE — 6360000004 HC RX CONTRAST MEDICATION: Performed by: RADIOLOGY

## 2018-05-31 PROCEDURE — 71260 CT THORAX DX C+: CPT

## 2018-05-31 RX ADMIN — IOPAMIDOL 80 ML: 755 INJECTION, SOLUTION INTRAVENOUS at 12:33

## 2018-05-31 RX ADMIN — IOHEXOL 50 ML: 240 INJECTION, SOLUTION INTRATHECAL; INTRAVASCULAR; INTRAVENOUS; ORAL at 12:33

## 2018-06-05 ENCOUNTER — HOSPITAL ENCOUNTER (OUTPATIENT)
Dept: INFUSION THERAPY | Age: 68
Discharge: HOME | End: 2018-06-05
Payer: MEDICARE

## 2018-06-05 ENCOUNTER — OFFICE VISIT (OUTPATIENT)
Dept: ONCOLOGY | Age: 68
End: 2018-06-05
Payer: MEDICARE

## 2018-06-05 VITALS
SYSTOLIC BLOOD PRESSURE: 123 MMHG | RESPIRATION RATE: 20 BRPM | HEIGHT: 73 IN | TEMPERATURE: 97.4 F | BODY MASS INDEX: 24.08 KG/M2 | WEIGHT: 181.7 LBS | HEART RATE: 63 BPM | DIASTOLIC BLOOD PRESSURE: 68 MMHG

## 2018-06-05 DIAGNOSIS — C20 RECTAL CANCER (HCC): Primary | ICD-10-CM

## 2018-06-05 LAB
ALBUMIN SERPL-MCNC: 3.7 G/DL (ref 3.5–5.2)
ALP BLD-CCNC: 145 U/L (ref 40–129)
ALT SERPL-CCNC: 24 U/L (ref 0–40)
ANION GAP SERPL CALCULATED.3IONS-SCNC: 13 MMOL/L (ref 7–16)
AST SERPL-CCNC: 31 U/L (ref 0–39)
BASOPHILS ABSOLUTE: 0.03 E9/L (ref 0–0.2)
BASOPHILS RELATIVE PERCENT: 0.6 % (ref 0–2)
BILIRUB SERPL-MCNC: 0.8 MG/DL (ref 0–1.2)
BUN BLDV-MCNC: 13 MG/DL (ref 8–23)
CALCIUM SERPL-MCNC: 9.4 MG/DL (ref 8.6–10.2)
CEA: 6.3 NG/ML (ref 0–5.2)
CHLORIDE BLD-SCNC: 103 MMOL/L (ref 98–107)
CO2: 26 MMOL/L (ref 22–29)
CREAT SERPL-MCNC: 0.9 MG/DL (ref 0.7–1.2)
EOSINOPHILS ABSOLUTE: 0.07 E9/L (ref 0.05–0.5)
EOSINOPHILS RELATIVE PERCENT: 1.4 % (ref 0–6)
GFR AFRICAN AMERICAN: >60
GFR NON-AFRICAN AMERICAN: >60 ML/MIN/1.73
GLUCOSE BLD-MCNC: 106 MG/DL (ref 74–109)
HCT VFR BLD CALC: 28.5 % (ref 37–54)
HEMOGLOBIN: 9.5 G/DL (ref 12.5–16.5)
IMMATURE GRANULOCYTES #: 0.09 E9/L
IMMATURE GRANULOCYTES %: 1.8 % (ref 0–5)
LYMPHOCYTES ABSOLUTE: 0.74 E9/L (ref 1.5–4)
LYMPHOCYTES RELATIVE PERCENT: 14.8 % (ref 20–42)
MCH RBC QN AUTO: 33.2 PG (ref 26–35)
MCHC RBC AUTO-ENTMCNC: 33.3 % (ref 32–34.5)
MCV RBC AUTO: 99.7 FL (ref 80–99.9)
MONOCYTES ABSOLUTE: 0.34 E9/L (ref 0.1–0.95)
MONOCYTES RELATIVE PERCENT: 6.8 % (ref 2–12)
NEUTROPHILS ABSOLUTE: 3.72 E9/L (ref 1.8–7.3)
NEUTROPHILS RELATIVE PERCENT: 74.6 % (ref 43–80)
PDW BLD-RTO: 16.3 FL (ref 11.5–15)
PLATELET # BLD: 105 E9/L (ref 130–450)
PMV BLD AUTO: 9.3 FL (ref 7–12)
POTASSIUM SERPL-SCNC: 2.9 MMOL/L (ref 3.5–5)
RBC # BLD: 2.86 E12/L (ref 3.8–5.8)
SODIUM BLD-SCNC: 142 MMOL/L (ref 132–146)
TOTAL PROTEIN: 6.4 G/DL (ref 6.4–8.3)
WBC # BLD: 5 E9/L (ref 4.5–11.5)

## 2018-06-05 PROCEDURE — 82378 CARCINOEMBRYONIC ANTIGEN: CPT

## 2018-06-05 PROCEDURE — 99214 OFFICE O/P EST MOD 30 MIN: CPT | Performed by: INTERNAL MEDICINE

## 2018-06-05 PROCEDURE — 80053 COMPREHEN METABOLIC PANEL: CPT

## 2018-06-05 PROCEDURE — 99214 OFFICE O/P EST MOD 30 MIN: CPT

## 2018-06-05 PROCEDURE — 85025 COMPLETE CBC W/AUTO DIFF WBC: CPT

## 2018-06-05 RX ORDER — HEPARIN SODIUM (PORCINE) LOCK FLUSH IV SOLN 100 UNIT/ML 100 UNIT/ML
500 SOLUTION INTRAVENOUS PRN
Status: CANCELLED | OUTPATIENT
Start: 2018-06-06

## 2018-06-05 RX ORDER — SODIUM CHLORIDE 9 MG/ML
INJECTION, SOLUTION INTRAVENOUS CONTINUOUS
Status: CANCELLED | OUTPATIENT
Start: 2018-06-06

## 2018-06-05 RX ORDER — 0.9 % SODIUM CHLORIDE 0.9 %
10 VIAL (ML) INJECTION ONCE
Status: CANCELLED | OUTPATIENT
Start: 2018-06-06 | End: 2018-06-05

## 2018-06-05 RX ORDER — DIPHENHYDRAMINE HYDROCHLORIDE 50 MG/ML
50 INJECTION INTRAMUSCULAR; INTRAVENOUS ONCE
Status: CANCELLED | OUTPATIENT
Start: 2018-06-06 | End: 2018-06-05

## 2018-06-05 RX ORDER — METHYLPREDNISOLONE SODIUM SUCCINATE 125 MG/2ML
125 INJECTION, POWDER, LYOPHILIZED, FOR SOLUTION INTRAMUSCULAR; INTRAVENOUS ONCE
Status: CANCELLED | OUTPATIENT
Start: 2018-06-06 | End: 2018-06-05

## 2018-06-05 RX ORDER — SODIUM CHLORIDE 9 MG/ML
INJECTION, SOLUTION INTRAVENOUS CONTINUOUS
Status: CANCELLED | OUTPATIENT
Start: 2018-06-06 | End: 2018-06-23

## 2018-06-05 RX ORDER — FLUOROURACIL 50 MG/ML
850 INJECTION, SOLUTION INTRAVENOUS ONCE
Status: CANCELLED | OUTPATIENT
Start: 2018-06-06

## 2018-06-05 RX ORDER — ATROPINE SULFATE 1 MG/ML
0.4 INJECTION, SOLUTION INTRAMUSCULAR; INTRAVENOUS; SUBCUTANEOUS ONCE
Status: CANCELLED | OUTPATIENT
Start: 2018-06-06 | End: 2018-06-05

## 2018-06-05 RX ORDER — POTASSIUM CHLORIDE 20 MEQ/1
20 TABLET, EXTENDED RELEASE ORAL 2 TIMES DAILY
Qty: 14 TABLET | Refills: 0 | Status: SHIPPED | OUTPATIENT
Start: 2018-06-05 | End: 2018-06-06 | Stop reason: SDUPTHER

## 2018-06-05 RX ORDER — PALONOSETRON 0.05 MG/ML
0.25 INJECTION, SOLUTION INTRAVENOUS ONCE
Status: CANCELLED | OUTPATIENT
Start: 2018-06-06

## 2018-06-05 RX ORDER — SODIUM CHLORIDE 0.9 % (FLUSH) 0.9 %
10 SYRINGE (ML) INJECTION PRN
Status: CANCELLED | OUTPATIENT
Start: 2018-06-06

## 2018-06-06 ENCOUNTER — HOSPITAL ENCOUNTER (OUTPATIENT)
Dept: INFUSION THERAPY | Age: 68
Discharge: HOME | End: 2018-06-06
Payer: MEDICARE

## 2018-06-06 VITALS
TEMPERATURE: 97.9 F | SYSTOLIC BLOOD PRESSURE: 117 MMHG | RESPIRATION RATE: 20 BRPM | HEART RATE: 64 BPM | DIASTOLIC BLOOD PRESSURE: 66 MMHG

## 2018-06-06 DIAGNOSIS — C20 RECTAL CANCER METASTASIZED TO LIVER (HCC): Primary | ICD-10-CM

## 2018-06-06 DIAGNOSIS — C78.7 RECTAL CANCER METASTASIZED TO LIVER (HCC): Primary | ICD-10-CM

## 2018-06-06 DIAGNOSIS — E83.51 HYPOCALCEMIA: ICD-10-CM

## 2018-06-06 DIAGNOSIS — C78.7 RECTAL CANCER METASTASIZED TO LIVER (HCC): ICD-10-CM

## 2018-06-06 DIAGNOSIS — C20 RECTAL CANCER METASTASIZED TO LIVER (HCC): ICD-10-CM

## 2018-06-06 PROCEDURE — 96549 UNLISTED CHEMOTHERAPY PX: CPT

## 2018-06-06 PROCEDURE — 6370000000 HC RX 637 (ALT 250 FOR IP): Performed by: NURSE PRACTITIONER

## 2018-06-06 PROCEDURE — 6360000002 HC RX W HCPCS: Performed by: INTERNAL MEDICINE

## 2018-06-06 PROCEDURE — 96417 CHEMO IV INFUS EACH ADDL SEQ: CPT

## 2018-06-06 PROCEDURE — 2580000003 HC RX 258: Performed by: INTERNAL MEDICINE

## 2018-06-06 PROCEDURE — 96413 CHEMO IV INFUSION 1 HR: CPT

## 2018-06-06 PROCEDURE — 96411 CHEMO IV PUSH ADDL DRUG: CPT

## 2018-06-06 PROCEDURE — 96368 THER/DIAG CONCURRENT INF: CPT

## 2018-06-06 PROCEDURE — 96375 TX/PRO/DX INJ NEW DRUG ADDON: CPT

## 2018-06-06 PROCEDURE — 96415 CHEMO IV INFUSION ADDL HR: CPT

## 2018-06-06 RX ORDER — SODIUM CHLORIDE 0.9 % (FLUSH) 0.9 %
10 SYRINGE (ML) INJECTION PRN
Status: CANCELLED | OUTPATIENT
Start: 2018-06-06

## 2018-06-06 RX ORDER — PALONOSETRON 0.05 MG/ML
0.25 INJECTION, SOLUTION INTRAVENOUS ONCE
Status: COMPLETED | OUTPATIENT
Start: 2018-06-06 | End: 2018-06-06

## 2018-06-06 RX ORDER — ATROPINE SULFATE 1 MG/ML
0.4 INJECTION, SOLUTION INTRAMUSCULAR; INTRAVENOUS; SUBCUTANEOUS ONCE
Status: DISCONTINUED | OUTPATIENT
Start: 2018-06-06 | End: 2018-06-06

## 2018-06-06 RX ORDER — DEXAMETHASONE SODIUM PHOSPHATE 10 MG/ML
10 INJECTION, SOLUTION INTRAMUSCULAR; INTRAVENOUS ONCE
Status: COMPLETED | OUTPATIENT
Start: 2018-06-06 | End: 2018-06-06

## 2018-06-06 RX ORDER — FLUOROURACIL 50 MG/ML
850 INJECTION, SOLUTION INTRAVENOUS ONCE
Status: COMPLETED | OUTPATIENT
Start: 2018-06-06 | End: 2018-06-06

## 2018-06-06 RX ORDER — HEPARIN SODIUM (PORCINE) LOCK FLUSH IV SOLN 100 UNIT/ML 100 UNIT/ML
500 SOLUTION INTRAVENOUS PRN
Status: CANCELLED | OUTPATIENT
Start: 2018-06-06

## 2018-06-06 RX ORDER — POTASSIUM CHLORIDE 20 MEQ/1
40 TABLET, EXTENDED RELEASE ORAL ONCE
Status: COMPLETED | OUTPATIENT
Start: 2018-06-06 | End: 2018-06-06

## 2018-06-06 RX ORDER — POTASSIUM CHLORIDE 20 MEQ/1
40 TABLET, EXTENDED RELEASE ORAL ONCE
Status: CANCELLED
Start: 2018-06-06 | End: 2018-06-06

## 2018-06-06 RX ORDER — SODIUM CHLORIDE 0.9 % (FLUSH) 0.9 %
10 SYRINGE (ML) INJECTION PRN
Status: DISCONTINUED | OUTPATIENT
Start: 2018-06-06 | End: 2018-06-07 | Stop reason: HOSPADM

## 2018-06-06 RX ORDER — SODIUM CHLORIDE 9 MG/ML
INJECTION, SOLUTION INTRAVENOUS CONTINUOUS
Status: DISCONTINUED | OUTPATIENT
Start: 2018-06-06 | End: 2018-06-07 | Stop reason: HOSPADM

## 2018-06-06 RX ORDER — POTASSIUM CHLORIDE 20 MEQ/1
20 TABLET, EXTENDED RELEASE ORAL DAILY
Qty: 60 TABLET | Refills: 2 | Status: SHIPPED | OUTPATIENT
Start: 2018-06-06 | End: 2020-05-15 | Stop reason: DRUGHIGH

## 2018-06-06 RX ADMIN — POTASSIUM CHLORIDE 40 MEQ: 20 TABLET, EXTENDED RELEASE ORAL at 09:24

## 2018-06-06 RX ADMIN — FLUOROURACIL 850 MG: 50 INJECTION, SOLUTION INTRAVENOUS at 11:47

## 2018-06-06 RX ADMIN — PALONOSETRON 0.25 MG: 0.05 INJECTION, SOLUTION INTRAVENOUS at 09:27

## 2018-06-06 RX ADMIN — SODIUM CHLORIDE: 9 INJECTION, SOLUTION INTRAVENOUS at 08:50

## 2018-06-06 RX ADMIN — LEUCOVORIN CALCIUM 850 MG: 500 INJECTION, POWDER, LYOPHILIZED, FOR SOLUTION INTRAMUSCULAR; INTRAVENOUS at 09:55

## 2018-06-06 RX ADMIN — Medication 10 ML: at 11:53

## 2018-06-06 RX ADMIN — DEXAMETHASONE SODIUM PHOSPHATE 10 MG: 10 INJECTION, SOLUTION INTRAMUSCULAR; INTRAVENOUS at 09:29

## 2018-06-06 RX ADMIN — IRINOTECAN HYDROCHLORIDE 400 MG: 100 INJECTION, SOLUTION INTRAVENOUS at 09:55

## 2018-06-06 RX ADMIN — BEVACIZUMAB 500 MG: 400 INJECTION, SOLUTION INTRAVENOUS at 08:51

## 2018-06-08 ENCOUNTER — HOSPITAL ENCOUNTER (OUTPATIENT)
Dept: INFUSION THERAPY | Age: 68
Discharge: HOME | End: 2018-06-08
Payer: MEDICARE

## 2018-06-08 DIAGNOSIS — C20 RECTAL CANCER METASTASIZED TO LIVER (HCC): ICD-10-CM

## 2018-06-08 DIAGNOSIS — C78.7 RECTAL CANCER METASTASIZED TO LIVER (HCC): ICD-10-CM

## 2018-06-08 PROCEDURE — 96372 THER/PROPH/DIAG INJ SC/IM: CPT

## 2018-06-08 PROCEDURE — 6360000002 HC RX W HCPCS: Performed by: INTERNAL MEDICINE

## 2018-06-08 RX ADMIN — PEGFILGRASTIM 6 MG: 6 INJECTION SUBCUTANEOUS at 14:30

## 2018-06-19 ENCOUNTER — OFFICE VISIT (OUTPATIENT)
Dept: ONCOLOGY | Age: 68
End: 2018-06-19
Payer: MEDICARE

## 2018-06-19 ENCOUNTER — HOSPITAL ENCOUNTER (OUTPATIENT)
Dept: INFUSION THERAPY | Age: 68
Discharge: HOME | End: 2018-06-19
Payer: MEDICARE

## 2018-06-19 VITALS — DIASTOLIC BLOOD PRESSURE: 59 MMHG | RESPIRATION RATE: 18 BRPM | SYSTOLIC BLOOD PRESSURE: 109 MMHG | HEART RATE: 20 BPM

## 2018-06-19 VITALS
HEART RATE: 53 BPM | BODY MASS INDEX: 24.39 KG/M2 | TEMPERATURE: 98 F | RESPIRATION RATE: 20 BRPM | WEIGHT: 184 LBS | SYSTOLIC BLOOD PRESSURE: 95 MMHG | HEIGHT: 73 IN | DIASTOLIC BLOOD PRESSURE: 55 MMHG

## 2018-06-19 DIAGNOSIS — C20 RECTAL CANCER (HCC): Primary | ICD-10-CM

## 2018-06-19 DIAGNOSIS — C78.7 RECTAL CANCER METASTASIZED TO LIVER (HCC): ICD-10-CM

## 2018-06-19 DIAGNOSIS — C20 RECTAL CANCER METASTASIZED TO LIVER (HCC): ICD-10-CM

## 2018-06-19 LAB
ABO/RH: NORMAL
ALBUMIN SERPL-MCNC: 3.4 G/DL (ref 3.5–5.2)
ALP BLD-CCNC: 134 U/L (ref 40–129)
ALT SERPL-CCNC: 16 U/L (ref 0–40)
ANION GAP SERPL CALCULATED.3IONS-SCNC: 10 MMOL/L (ref 7–16)
ANISOCYTOSIS: ABNORMAL
ANTIBODY SCREEN: NORMAL
AST SERPL-CCNC: 23 U/L (ref 0–39)
BASOPHILS ABSOLUTE: 0.03 E9/L (ref 0–0.2)
BASOPHILS RELATIVE PERCENT: 1.5 % (ref 0–2)
BILIRUB SERPL-MCNC: 0.7 MG/DL (ref 0–1.2)
BUN BLDV-MCNC: 12 MG/DL (ref 8–23)
CALCIUM SERPL-MCNC: 8.8 MG/DL (ref 8.6–10.2)
CEA: 6.1 NG/ML (ref 0–5.2)
CHLORIDE BLD-SCNC: 110 MMOL/L (ref 98–107)
CO2: 23 MMOL/L (ref 22–29)
CREAT SERPL-MCNC: 0.9 MG/DL (ref 0.7–1.2)
EOSINOPHILS ABSOLUTE: 0.08 E9/L (ref 0.05–0.5)
EOSINOPHILS RELATIVE PERCENT: 3.6 % (ref 0–6)
GFR AFRICAN AMERICAN: >60
GFR NON-AFRICAN AMERICAN: >60 ML/MIN/1.73
GLUCOSE BLD-MCNC: 99 MG/DL (ref 74–109)
HCT VFR BLD CALC: 25.4 % (ref 37–54)
HEMOGLOBIN: 8.2 G/DL (ref 12.5–16.5)
LYMPHOCYTES ABSOLUTE: 0.53 E9/L (ref 1.5–4)
LYMPHOCYTES RELATIVE PERCENT: 25.4 % (ref 20–42)
MCH RBC QN AUTO: 33.2 PG (ref 26–35)
MCHC RBC AUTO-ENTMCNC: 32.3 % (ref 32–34.5)
MCV RBC AUTO: 102.8 FL (ref 80–99.9)
MONOCYTES ABSOLUTE: 0.08 E9/L (ref 0.1–0.95)
MONOCYTES RELATIVE PERCENT: 4.1 % (ref 2–12)
NEUTROPHILS ABSOLUTE: 1.39 E9/L (ref 1.8–7.3)
NEUTROPHILS RELATIVE PERCENT: 65.5 % (ref 43–80)
NUCLEATED RED BLOOD CELLS: 0.5 /100 WBC
OVALOCYTES: ABNORMAL
PDW BLD-RTO: 16.9 FL (ref 11.5–15)
PLATELET # BLD: 85 E9/L (ref 130–450)
PLATELET CONFIRMATION: NORMAL
PMV BLD AUTO: 10.4 FL (ref 7–12)
POIKILOCYTES: ABNORMAL
POLYCHROMASIA: ABNORMAL
POTASSIUM SERPL-SCNC: 4.1 MMOL/L (ref 3.5–5)
RBC # BLD: 2.47 E12/L (ref 3.8–5.8)
SODIUM BLD-SCNC: 143 MMOL/L (ref 132–146)
TOTAL PROTEIN: 5.8 G/DL (ref 6.4–8.3)
WBC # BLD: 2.1 E9/L (ref 4.5–11.5)

## 2018-06-19 PROCEDURE — 86901 BLOOD TYPING SEROLOGIC RH(D): CPT

## 2018-06-19 PROCEDURE — 82378 CARCINOEMBRYONIC ANTIGEN: CPT

## 2018-06-19 PROCEDURE — 96413 CHEMO IV INFUSION 1 HR: CPT

## 2018-06-19 PROCEDURE — 80053 COMPREHEN METABOLIC PANEL: CPT

## 2018-06-19 PROCEDURE — 2580000003 HC RX 258: Performed by: INTERNAL MEDICINE

## 2018-06-19 PROCEDURE — 6360000002 HC RX W HCPCS: Performed by: INTERNAL MEDICINE

## 2018-06-19 PROCEDURE — 96375 TX/PRO/DX INJ NEW DRUG ADDON: CPT

## 2018-06-19 PROCEDURE — 86923 COMPATIBILITY TEST ELECTRIC: CPT

## 2018-06-19 PROCEDURE — 96366 THER/PROPH/DIAG IV INF ADDON: CPT

## 2018-06-19 PROCEDURE — 86850 RBC ANTIBODY SCREEN: CPT

## 2018-06-19 PROCEDURE — 96417 CHEMO IV INFUS EACH ADDL SEQ: CPT

## 2018-06-19 PROCEDURE — 86900 BLOOD TYPING SEROLOGIC ABO: CPT

## 2018-06-19 PROCEDURE — 85025 COMPLETE CBC W/AUTO DIFF WBC: CPT

## 2018-06-19 PROCEDURE — 96415 CHEMO IV INFUSION ADDL HR: CPT

## 2018-06-19 PROCEDURE — 96368 THER/DIAG CONCURRENT INF: CPT

## 2018-06-19 PROCEDURE — 96411 CHEMO IV PUSH ADDL DRUG: CPT

## 2018-06-19 PROCEDURE — 99214 OFFICE O/P EST MOD 30 MIN: CPT | Performed by: INTERNAL MEDICINE

## 2018-06-19 RX ORDER — FLUOROURACIL 50 MG/ML
850 INJECTION, SOLUTION INTRAVENOUS ONCE
Status: COMPLETED | OUTPATIENT
Start: 2018-06-19 | End: 2018-06-19

## 2018-06-19 RX ORDER — SODIUM CHLORIDE 0.9 % (FLUSH) 0.9 %
10 SYRINGE (ML) INJECTION PRN
Status: CANCELLED | OUTPATIENT
Start: 2018-06-19

## 2018-06-19 RX ORDER — ATROPINE SULFATE 1 MG/ML
0.4 INJECTION, SOLUTION INTRAMUSCULAR; INTRAVENOUS; SUBCUTANEOUS ONCE
Status: CANCELLED | OUTPATIENT
Start: 2018-06-19 | End: 2018-06-19

## 2018-06-19 RX ORDER — SODIUM CHLORIDE 9 MG/ML
INJECTION, SOLUTION INTRAVENOUS CONTINUOUS
Status: CANCELLED | OUTPATIENT
Start: 2018-06-19 | End: 2018-07-07

## 2018-06-19 RX ORDER — HEPARIN SODIUM (PORCINE) LOCK FLUSH IV SOLN 100 UNIT/ML 100 UNIT/ML
500 SOLUTION INTRAVENOUS PRN
Status: CANCELLED | OUTPATIENT
Start: 2018-06-19

## 2018-06-19 RX ORDER — ATROPINE SULFATE 1 MG/ML
0.4 INJECTION, SOLUTION INTRAMUSCULAR; INTRAVENOUS; SUBCUTANEOUS ONCE
Status: COMPLETED | OUTPATIENT
Start: 2018-06-19 | End: 2018-06-19

## 2018-06-19 RX ORDER — SODIUM CHLORIDE 0.9 % (FLUSH) 0.9 %
20 SYRINGE (ML) INJECTION PRN
Status: CANCELLED | OUTPATIENT
Start: 2018-06-20

## 2018-06-19 RX ORDER — SODIUM CHLORIDE 9 MG/ML
INJECTION, SOLUTION INTRAVENOUS CONTINUOUS
Status: CANCELLED | OUTPATIENT
Start: 2018-06-20

## 2018-06-19 RX ORDER — FLUOROURACIL 50 MG/ML
850 INJECTION, SOLUTION INTRAVENOUS ONCE
Status: CANCELLED | OUTPATIENT
Start: 2018-06-19

## 2018-06-19 RX ORDER — 0.9 % SODIUM CHLORIDE 0.9 %
10 VIAL (ML) INJECTION ONCE
Status: CANCELLED | OUTPATIENT
Start: 2018-06-19 | End: 2018-06-19

## 2018-06-19 RX ORDER — METHYLPREDNISOLONE SODIUM SUCCINATE 125 MG/2ML
125 INJECTION, POWDER, LYOPHILIZED, FOR SOLUTION INTRAMUSCULAR; INTRAVENOUS ONCE
Status: CANCELLED | OUTPATIENT
Start: 2018-06-19 | End: 2018-06-19

## 2018-06-19 RX ORDER — METHYLPREDNISOLONE SODIUM SUCCINATE 125 MG/2ML
125 INJECTION, POWDER, LYOPHILIZED, FOR SOLUTION INTRAMUSCULAR; INTRAVENOUS ONCE
Status: CANCELLED | OUTPATIENT
Start: 2018-06-20 | End: 2018-06-20

## 2018-06-19 RX ORDER — DIPHENHYDRAMINE HYDROCHLORIDE 50 MG/ML
50 INJECTION INTRAMUSCULAR; INTRAVENOUS ONCE
Status: CANCELLED | OUTPATIENT
Start: 2018-06-20 | End: 2018-06-20

## 2018-06-19 RX ORDER — 0.9 % SODIUM CHLORIDE 0.9 %
10 VIAL (ML) INJECTION ONCE
Status: CANCELLED | OUTPATIENT
Start: 2018-06-20 | End: 2018-06-20

## 2018-06-19 RX ORDER — SODIUM CHLORIDE 9 MG/ML
INJECTION, SOLUTION INTRAVENOUS CONTINUOUS
Status: CANCELLED | OUTPATIENT
Start: 2018-06-19

## 2018-06-19 RX ORDER — DIPHENHYDRAMINE HYDROCHLORIDE 50 MG/ML
50 INJECTION INTRAMUSCULAR; INTRAVENOUS ONCE
Status: CANCELLED | OUTPATIENT
Start: 2018-06-19 | End: 2018-06-19

## 2018-06-19 RX ORDER — PALONOSETRON 0.05 MG/ML
0.25 INJECTION, SOLUTION INTRAVENOUS ONCE
Status: COMPLETED | OUTPATIENT
Start: 2018-06-19 | End: 2018-06-19

## 2018-06-19 RX ORDER — PALONOSETRON 0.05 MG/ML
0.25 INJECTION, SOLUTION INTRAVENOUS ONCE
Status: CANCELLED | OUTPATIENT
Start: 2018-06-19

## 2018-06-19 RX ORDER — SODIUM CHLORIDE 9 MG/ML
250 INJECTION, SOLUTION INTRAVENOUS CONTINUOUS
Status: DISCONTINUED | OUTPATIENT
Start: 2018-06-19 | End: 2018-06-20 | Stop reason: HOSPADM

## 2018-06-19 RX ORDER — DEXAMETHASONE SODIUM PHOSPHATE 10 MG/ML
10 INJECTION, SOLUTION INTRAMUSCULAR; INTRAVENOUS ONCE
Status: COMPLETED | OUTPATIENT
Start: 2018-06-19 | End: 2018-06-19

## 2018-06-19 RX ADMIN — ATROPINE SULFATE 0.4 MG: 1 INJECTION, SOLUTION INTRAMUSCULAR; INTRAVENOUS; SUBCUTANEOUS at 12:49

## 2018-06-19 RX ADMIN — BEVACIZUMAB 500 MG: 400 INJECTION, SOLUTION INTRAVENOUS at 11:58

## 2018-06-19 RX ADMIN — LEUCOVORIN CALCIUM 850 MG: 350 INJECTION, POWDER, LYOPHILIZED, FOR SOLUTION INTRAMUSCULAR; INTRAVENOUS at 12:56

## 2018-06-19 RX ADMIN — PALONOSETRON 0.25 MG: 0.05 INJECTION, SOLUTION INTRAVENOUS at 12:49

## 2018-06-19 RX ADMIN — DEXAMETHASONE SODIUM PHOSPHATE 10 MG: 10 INJECTION, SOLUTION INTRAMUSCULAR; INTRAVENOUS at 12:50

## 2018-06-19 RX ADMIN — FLUOROURACIL 850 MG: 50 INJECTION, SOLUTION INTRAVENOUS at 14:51

## 2018-06-19 RX ADMIN — SODIUM CHLORIDE 400 MG: 0.9 INJECTION, SOLUTION INTRAVENOUS at 12:56

## 2018-06-19 RX ADMIN — SODIUM CHLORIDE 250 ML: 9 INJECTION, SOLUTION INTRAVENOUS at 11:58

## 2018-06-19 NOTE — PROGRESS NOTES
Ok to proceed with chemo today per linda Guzmán RN with instruction from Dr. Brook Anglin with labs review WBC 2.1 Plt. 85 neutrophils 1.39 and hemoglobin 8.2. Also instructed pt will get a unit of blood tomorrow.

## 2018-06-20 ENCOUNTER — HOSPITAL ENCOUNTER (OUTPATIENT)
Dept: INFUSION THERAPY | Age: 68
Discharge: HOME | End: 2018-06-20
Payer: MEDICARE

## 2018-06-20 VITALS
TEMPERATURE: 98.7 F | RESPIRATION RATE: 20 BRPM | DIASTOLIC BLOOD PRESSURE: 55 MMHG | SYSTOLIC BLOOD PRESSURE: 110 MMHG | HEART RATE: 61 BPM

## 2018-06-20 DIAGNOSIS — C79.51 BONE METASTASIS (HCC): ICD-10-CM

## 2018-06-20 DIAGNOSIS — C78.7 RECTAL CANCER METASTASIZED TO LIVER (HCC): ICD-10-CM

## 2018-06-20 DIAGNOSIS — C20 RECTAL CANCER METASTASIZED TO LIVER (HCC): ICD-10-CM

## 2018-06-20 LAB
BLOOD BANK DISPENSE STATUS: NORMAL
BLOOD BANK PRODUCT CODE: NORMAL
BPU ID: NORMAL
DESCRIPTION BLOOD BANK: NORMAL

## 2018-06-20 PROCEDURE — P9016 RBC LEUKOCYTES REDUCED: HCPCS

## 2018-06-20 PROCEDURE — 2580000003 HC RX 258: Performed by: NURSE PRACTITIONER

## 2018-06-20 PROCEDURE — 36430 TRANSFUSION BLD/BLD COMPNT: CPT

## 2018-06-20 RX ORDER — SODIUM CHLORIDE 9 MG/ML
INJECTION, SOLUTION INTRAVENOUS CONTINUOUS
Status: CANCELLED | OUTPATIENT
Start: 2018-06-20

## 2018-06-20 RX ORDER — METHYLPREDNISOLONE SODIUM SUCCINATE 125 MG/2ML
125 INJECTION, POWDER, LYOPHILIZED, FOR SOLUTION INTRAMUSCULAR; INTRAVENOUS ONCE
Status: CANCELLED | OUTPATIENT
Start: 2018-06-20 | End: 2018-06-20

## 2018-06-20 RX ORDER — DIPHENHYDRAMINE HYDROCHLORIDE 50 MG/ML
50 INJECTION INTRAMUSCULAR; INTRAVENOUS ONCE
Status: CANCELLED | OUTPATIENT
Start: 2018-06-20 | End: 2018-06-20

## 2018-06-20 RX ORDER — 0.9 % SODIUM CHLORIDE 0.9 %
10 VIAL (ML) INJECTION ONCE
Status: CANCELLED | OUTPATIENT
Start: 2018-06-20 | End: 2018-06-20

## 2018-06-20 RX ORDER — EPINEPHRINE 1 MG/ML
0.3 INJECTION, SOLUTION, CONCENTRATE INTRAVENOUS PRN
Status: CANCELLED | OUTPATIENT
Start: 2018-06-20

## 2018-06-20 RX ORDER — SODIUM CHLORIDE 9 MG/ML
250 INJECTION, SOLUTION INTRAVENOUS CONTINUOUS
Status: DISCONTINUED | OUTPATIENT
Start: 2018-06-20 | End: 2018-06-21 | Stop reason: HOSPADM

## 2018-06-20 RX ORDER — SODIUM CHLORIDE 0.9 % (FLUSH) 0.9 %
20 SYRINGE (ML) INJECTION PRN
Status: CANCELLED | OUTPATIENT
Start: 2018-06-20

## 2018-06-20 RX ADMIN — SODIUM CHLORIDE 250 ML: 9 INJECTION, SOLUTION INTRAVENOUS at 09:06

## 2018-06-21 ENCOUNTER — HOSPITAL ENCOUNTER (OUTPATIENT)
Dept: INFUSION THERAPY | Age: 68
Discharge: HOME | End: 2018-06-21
Payer: MEDICARE

## 2018-06-21 DIAGNOSIS — C78.7 RECTAL CANCER METASTASIZED TO LIVER (HCC): ICD-10-CM

## 2018-06-21 DIAGNOSIS — C20 RECTAL CANCER METASTASIZED TO LIVER (HCC): ICD-10-CM

## 2018-06-21 PROCEDURE — 6360000002 HC RX W HCPCS: Performed by: INTERNAL MEDICINE

## 2018-06-21 PROCEDURE — 96372 THER/PROPH/DIAG INJ SC/IM: CPT

## 2018-06-21 RX ADMIN — PEGFILGRASTIM 6 MG: 6 INJECTION SUBCUTANEOUS at 15:34

## 2018-06-26 ENCOUNTER — HOSPITAL ENCOUNTER (OUTPATIENT)
Dept: INFUSION THERAPY | Age: 68
Discharge: HOME | End: 2018-06-26
Payer: MEDICARE

## 2018-06-26 DIAGNOSIS — C79.51 BONE METASTASIS (HCC): ICD-10-CM

## 2018-06-26 DIAGNOSIS — C20 RECTAL CANCER METASTASIZED TO LIVER (HCC): ICD-10-CM

## 2018-06-26 DIAGNOSIS — C78.7 RECTAL CANCER METASTASIZED TO LIVER (HCC): ICD-10-CM

## 2018-06-26 PROCEDURE — 6360000002 HC RX W HCPCS: Performed by: INTERNAL MEDICINE

## 2018-06-26 PROCEDURE — 96372 THER/PROPH/DIAG INJ SC/IM: CPT

## 2018-06-26 RX ADMIN — DENOSUMAB 120 MG: 120 INJECTION SUBCUTANEOUS at 08:47

## 2018-06-26 NOTE — PROGRESS NOTES
Patient presents to clinic today for Xgeva injection. Patient's labs monitored, specifically, Calcium level, to ensure no increased risk of hypocalcemia with administration of the medication. Patient's calcium level from 6/19/18 was 8.8 mg/dL. Patient received therapy and will continue to be monitored prior to each dose.     Anita Fide, [place] 6/26/2018 8:42 AM

## 2018-07-03 ENCOUNTER — HOSPITAL ENCOUNTER (OUTPATIENT)
Dept: INFUSION THERAPY | Age: 68
Discharge: HOME | End: 2018-07-03
Payer: MEDICARE

## 2018-07-03 ENCOUNTER — OFFICE VISIT (OUTPATIENT)
Dept: ONCOLOGY | Age: 68
End: 2018-07-03
Payer: MEDICARE

## 2018-07-03 VITALS
BODY MASS INDEX: 24.96 KG/M2 | HEIGHT: 73 IN | SYSTOLIC BLOOD PRESSURE: 101 MMHG | HEART RATE: 77 BPM | TEMPERATURE: 97.7 F | DIASTOLIC BLOOD PRESSURE: 59 MMHG | WEIGHT: 188.3 LBS

## 2018-07-03 VITALS — RESPIRATION RATE: 20 BRPM | SYSTOLIC BLOOD PRESSURE: 125 MMHG | DIASTOLIC BLOOD PRESSURE: 60 MMHG | HEART RATE: 48 BPM

## 2018-07-03 DIAGNOSIS — C20 RECTAL CANCER METASTASIZED TO LIVER (HCC): ICD-10-CM

## 2018-07-03 DIAGNOSIS — C20 RECTAL CANCER (HCC): Primary | ICD-10-CM

## 2018-07-03 DIAGNOSIS — C78.7 RECTAL CANCER METASTASIZED TO LIVER (HCC): ICD-10-CM

## 2018-07-03 LAB
ALBUMIN SERPL-MCNC: 3.4 G/DL (ref 3.5–5.2)
ALP BLD-CCNC: 119 U/L (ref 40–129)
ALT SERPL-CCNC: 18 U/L (ref 0–40)
ANION GAP SERPL CALCULATED.3IONS-SCNC: 12 MMOL/L (ref 7–16)
AST SERPL-CCNC: 30 U/L (ref 0–39)
BILIRUB SERPL-MCNC: 0.6 MG/DL (ref 0–1.2)
BUN BLDV-MCNC: 9 MG/DL (ref 8–23)
CALCIUM SERPL-MCNC: 8.7 MG/DL (ref 8.6–10.2)
CEA: 7.4 NG/ML (ref 0–5.2)
CHLORIDE BLD-SCNC: 108 MMOL/L (ref 98–107)
CO2: 24 MMOL/L (ref 22–29)
CREAT SERPL-MCNC: 1 MG/DL (ref 0.7–1.2)
GFR AFRICAN AMERICAN: >60
GFR NON-AFRICAN AMERICAN: >60 ML/MIN/1.73
GLUCOSE BLD-MCNC: 113 MG/DL (ref 74–109)
HCT VFR BLD CALC: 28.1 % (ref 37–54)
HEMOGLOBIN: 9 G/DL (ref 12.5–16.5)
MCH RBC QN AUTO: 32.8 PG (ref 26–35)
MCHC RBC AUTO-ENTMCNC: 32 % (ref 32–34.5)
MCV RBC AUTO: 102.6 FL (ref 80–99.9)
PDW BLD-RTO: 17.2 FL (ref 11.5–15)
PLATELET # BLD: 95 E9/L (ref 130–450)
PLATELET CONFIRMATION: NORMAL
PMV BLD AUTO: 10.5 FL (ref 7–12)
POTASSIUM SERPL-SCNC: 3.8 MMOL/L (ref 3.5–5)
RBC # BLD: 2.74 E12/L (ref 3.8–5.8)
SODIUM BLD-SCNC: 144 MMOL/L (ref 132–146)
TOTAL PROTEIN: 5.7 G/DL (ref 6.4–8.3)
WBC # BLD: 3.7 E9/L (ref 4.5–11.5)

## 2018-07-03 PROCEDURE — 96375 TX/PRO/DX INJ NEW DRUG ADDON: CPT

## 2018-07-03 PROCEDURE — 96413 CHEMO IV INFUSION 1 HR: CPT

## 2018-07-03 PROCEDURE — 96411 CHEMO IV PUSH ADDL DRUG: CPT

## 2018-07-03 PROCEDURE — 96417 CHEMO IV INFUS EACH ADDL SEQ: CPT

## 2018-07-03 PROCEDURE — 2580000003 HC RX 258: Performed by: INTERNAL MEDICINE

## 2018-07-03 PROCEDURE — 99214 OFFICE O/P EST MOD 30 MIN: CPT | Performed by: INTERNAL MEDICINE

## 2018-07-03 PROCEDURE — 85027 COMPLETE CBC AUTOMATED: CPT

## 2018-07-03 PROCEDURE — 80053 COMPREHEN METABOLIC PANEL: CPT

## 2018-07-03 PROCEDURE — 6360000002 HC RX W HCPCS: Performed by: INTERNAL MEDICINE

## 2018-07-03 PROCEDURE — 96415 CHEMO IV INFUSION ADDL HR: CPT

## 2018-07-03 PROCEDURE — 82378 CARCINOEMBRYONIC ANTIGEN: CPT

## 2018-07-03 PROCEDURE — 96368 THER/DIAG CONCURRENT INF: CPT

## 2018-07-03 RX ORDER — SODIUM CHLORIDE 0.9 % (FLUSH) 0.9 %
10 SYRINGE (ML) INJECTION PRN
Status: CANCELLED | OUTPATIENT
Start: 2018-07-03

## 2018-07-03 RX ORDER — DEXAMETHASONE SODIUM PHOSPHATE 10 MG/ML
10 INJECTION, SOLUTION INTRAMUSCULAR; INTRAVENOUS ONCE
Status: COMPLETED | OUTPATIENT
Start: 2018-07-03 | End: 2018-07-03

## 2018-07-03 RX ORDER — HEPARIN SODIUM (PORCINE) LOCK FLUSH IV SOLN 100 UNIT/ML 100 UNIT/ML
500 SOLUTION INTRAVENOUS PRN
Status: CANCELLED | OUTPATIENT
Start: 2018-07-03

## 2018-07-03 RX ORDER — SODIUM CHLORIDE 9 MG/ML
INJECTION, SOLUTION INTRAVENOUS CONTINUOUS
Status: CANCELLED | OUTPATIENT
Start: 2018-07-03

## 2018-07-03 RX ORDER — ATROPINE SULFATE 0.4 MG/ML
0.4 AMPUL (ML) INJECTION ONCE
Status: COMPLETED | OUTPATIENT
Start: 2018-07-03 | End: 2018-07-03

## 2018-07-03 RX ORDER — 0.9 % SODIUM CHLORIDE 0.9 %
10 VIAL (ML) INJECTION ONCE
Status: CANCELLED | OUTPATIENT
Start: 2018-07-03 | End: 2018-07-03

## 2018-07-03 RX ORDER — FLUOROURACIL 50 MG/ML
850 INJECTION, SOLUTION INTRAVENOUS ONCE
Status: COMPLETED | OUTPATIENT
Start: 2018-07-03 | End: 2018-07-03

## 2018-07-03 RX ORDER — FLUOROURACIL 50 MG/ML
850 INJECTION, SOLUTION INTRAVENOUS ONCE
Status: CANCELLED | OUTPATIENT
Start: 2018-07-03

## 2018-07-03 RX ORDER — PALONOSETRON 0.05 MG/ML
0.25 INJECTION, SOLUTION INTRAVENOUS ONCE
Status: COMPLETED | OUTPATIENT
Start: 2018-07-03 | End: 2018-07-03

## 2018-07-03 RX ORDER — ATROPINE SULFATE 1 MG/ML
0.4 INJECTION, SOLUTION INTRAMUSCULAR; INTRAVENOUS; SUBCUTANEOUS ONCE
Status: CANCELLED | OUTPATIENT
Start: 2018-07-03 | End: 2018-07-03

## 2018-07-03 RX ORDER — SODIUM CHLORIDE 0.9 % (FLUSH) 0.9 %
10 SYRINGE (ML) INJECTION PRN
Status: DISCONTINUED | OUTPATIENT
Start: 2018-07-03 | End: 2018-07-04 | Stop reason: HOSPADM

## 2018-07-03 RX ORDER — PALONOSETRON 0.05 MG/ML
0.25 INJECTION, SOLUTION INTRAVENOUS ONCE
Status: CANCELLED | OUTPATIENT
Start: 2018-07-03

## 2018-07-03 RX ORDER — SODIUM CHLORIDE 9 MG/ML
250 INJECTION, SOLUTION INTRAVENOUS CONTINUOUS
Status: DISCONTINUED | OUTPATIENT
Start: 2018-07-03 | End: 2018-07-04 | Stop reason: HOSPADM

## 2018-07-03 RX ORDER — DIPHENHYDRAMINE HYDROCHLORIDE 50 MG/ML
50 INJECTION INTRAMUSCULAR; INTRAVENOUS ONCE
Status: CANCELLED | OUTPATIENT
Start: 2018-07-03 | End: 2018-07-03

## 2018-07-03 RX ORDER — METHYLPREDNISOLONE SODIUM SUCCINATE 125 MG/2ML
125 INJECTION, POWDER, LYOPHILIZED, FOR SOLUTION INTRAMUSCULAR; INTRAVENOUS ONCE
Status: CANCELLED | OUTPATIENT
Start: 2018-07-03 | End: 2018-07-03

## 2018-07-03 RX ORDER — SODIUM CHLORIDE 9 MG/ML
250 INJECTION, SOLUTION INTRAVENOUS CONTINUOUS
Status: CANCELLED | OUTPATIENT
Start: 2018-07-03 | End: 2018-07-20

## 2018-07-03 RX ORDER — ATROPINE SULFATE 1 MG/ML
0.4 INJECTION, SOLUTION INTRAMUSCULAR; INTRAVENOUS; SUBCUTANEOUS ONCE
Status: DISCONTINUED | OUTPATIENT
Start: 2018-07-03 | End: 2018-07-03 | Stop reason: CLARIF

## 2018-07-03 RX ADMIN — SODIUM CHLORIDE 250 ML: 9 INJECTION, SOLUTION INTRAVENOUS at 09:55

## 2018-07-03 RX ADMIN — Medication 10 ML: at 13:03

## 2018-07-03 RX ADMIN — LEUCOVORIN CALCIUM 850 MG: 500 INJECTION, POWDER, LYOPHILIZED, FOR SOLUTION INTRAMUSCULAR; INTRAVENOUS at 11:13

## 2018-07-03 RX ADMIN — PALONOSETRON HYDROCHLORIDE 0.25 MG: 0.25 INJECTION INTRAVENOUS at 10:43

## 2018-07-03 RX ADMIN — DEXAMETHASONE SODIUM PHOSPHATE 10 MG: 10 INJECTION, SOLUTION INTRAMUSCULAR; INTRAVENOUS at 10:55

## 2018-07-03 RX ADMIN — SODIUM CHLORIDE 400 MG: 9 INJECTION, SOLUTION INTRAVENOUS at 11:14

## 2018-07-03 RX ADMIN — ATROPINE SULFATE 0.4 MG: 0.4 INJECTION INTRAMUSCULAR; INTRAVENOUS; SUBCUTANEOUS at 10:54

## 2018-07-03 RX ADMIN — FLUOROURACIL 850 MG: 50 INJECTION, SOLUTION INTRAVENOUS at 13:00

## 2018-07-03 RX ADMIN — BEVACIZUMAB 400 MG: 400 INJECTION, SOLUTION INTRAVENOUS at 09:58

## 2018-07-03 NOTE — PROGRESS NOTES
3488. Bone scan on 11/10/2015 noted no metastatic disease. CT chest on 11/10/2015 revealed Multiple pulmonary nodules in the upper and lower lobes consistent with metastatic disease;   Hepatic metastasis also visualized. 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 Mediport was placed. Cycle # 1 of FOLFOX + Avastin was on 11/23/2015. CEA was 4555 on 11/23/2015. Cycle # 2 of FOLFOX + Avastin was on 12/08/2015. CEA was 3160 on 12/08/2015. Cycle # 3 of FOLFOX + Avastin was on 12/22/2015. CEA was 2516 on 12/21/2015. Cycle # 4 of FOLFOX + Avastin was on 01/05/2016. CEA was 2098 on 01/05/2015. Cycle # 5 of FOLFOX + Avastin was on 01/19/2016. CEA was 1511 on 01/05/2015.     -Bone scan on 01/26/2016 noted no metastatic disease. CT chest on 01/26/2016 revealed Significant response to treatment with no visible residual nodules. CT scan abdomen/pelvis on 01/26/2016 noted Interval decreased size of multiple masses in the liver compatible with treatment response. Continue another 2 months of FOLFOX + Avastin and repeat scans. Cycle # 6 of FOLFOX + Avastin was on 02/02/2016.  on 02/02/2016. Cycle # 7 of FOLFOX + Avastin was on 02/16/2016.  on 02/16/2016. Cycle # 8 of FOLFOX + Avastin was on 03/01/2016.  on 03/01/2016. Cycle # 9 of FOLFOX + Avastin was on 03/15/2016.  on 03/15/2016. Cycle # 10 of FOLFOX + Avastin was on 03/29/2016. .4 on 03/29/2016. Cycle # 11 of FOLFOX + Avastin was on 04/12/2016. .4 on 04/12/2016.     Re-staging scans on 04/19/2016: CT Chest: clear lungs; no evidence of recurring pulmonary nodule; CT Abdomen/Pelvis: Further interval decrease in size of the multiple metastatic hepatic lesions, the largest lesion now measures 3.9 x 3.5 cm and previously measured 4.5 cm in maximum diameter. No mesenteric lymphadenopathy is identified; Bone Scan: No evidence of osseous metastasis.   Continue same regimen and re-stage in 2-3 months. Cycle # 12 FOLFOX + Avastin was on 04/26/2016. .5 on 04/26/2016. Cycle # 13 FOLFOX + Avastin was on 05/10/2016. .3 on 05/10/2016. Cycle # 14 FOLFOX+AVASTIN was on 05/24/2016. .5 on 05/24/2016. Cycle # 15 FOLFOX + Avastin was on 06/07/2016. CEA 90.5 on 06/07/2016. Admitted to [facility] 06/13/2016-06/16/2016 for abdominal pain: EGD noted 1.5 cm clean based duodenal bulb ulceration s/p epinephrine and bicap per Dr. Rebecca Clifford. No active bleeding. A. Stomach, biopsy: Mild chronic gastritis, immunostain negative for Helicobacter  B. Esophagus, biopsy: Gastric glandular mucosa with prominent intestinal metaplasia ([place]'s epithelium), negative for epithelial dysplasia, esophageal squamous mucosa not identified  Cycle # 16 FOLFOX (discontinued avastin (bevacizumab) given association of peptic ulcer disease and known association of GI perforation) was on 06/21/2016. Cycle # 17 FOLFOX was on 07/05/2016. CEA 52.6 on 07/19/2016. Cycle # 17 FOLFOX was on 07/05/2016. CEA 52.6 on 07/05/2016. CEA 47.6 on 07/19/2016.     Re-staging scans 07/19/2106: CT Chest negative for metastatic disease. CT Abdomen/Pelvis: Stable hepatic lesions; Question new mural thickening in the cecum. Bone Scan: New Let hip lesion suspicious for bone metastasis.     Increased CEA; new mural thickening in the cecum and new left hip lesion suspicious for bone metastasis are consistent with disease progression; He derived maximum benefit from FOLFOX/AVASTIN. D/C FOLFOX/AVASTIN. We recommended FOLFIRI second line therapy. Cycle # 1 FOLFIRI was on 08/02/2016. CEA 40.8 on 08/02/2016. Xgeva q4 weeks started on 08/02/2016. Cycle # 2 FOLFIRI was on 08/16/2016. CEA 31.7 on 08/16/2016. Cycle # 3 FOLFIRI (added Avastin) was on 08/30/2016 given that ulcers healed on EGD 08/15/2016 by Dr. Alonso Carl; Protonix bid since avastin re-started. Colonoscopy on 08/29/2016 (to look at the cecal area) unremarkable.  CEA 26.7 on 08/30/2016. Cycle # 4 FOLFIRI/Avastin was on 09/13/2016. CEA 23.4 on 09/13/2016. Cycle # 5 FOLFIRI/Avastin was on 09/27/2016. CEA 22.3 on 09/27/2016. Cycle # 6 FOLFIRI/Avastin was on 10/11/2016. CEA 18.4 on 10/11/2016.      Bone scan 10/21/2016 stable bone metastasis; ? New lesion anterior left sixth rib likely interval healing fracture. CT abdomen/pelvis revealed stable hepatic metastasis. CT chest negative for metastatic disease. Continue FOLFIRI/Avastin and repeat scans in 3 months. Cycle # 7 FOLFIRI/Avastin was on 10/25/2016. CEA 16.1  Cycle # 8 FOLFIRI/Avastin was on 11/08/2016. CEA 15.7  Cycle # 9 FOLFIRI/Avastin was on 11/29/2016. CEA 13.6 on 11/29/2016. Cycle # 10 FOLFIRI/Avastin was on 12/13/2016. CEA 10.6 on 12/13/2016. Cycle # 11 FOLFIRI/Avastin was on 12/27/2016. CEA 11.1 on 12/27/2016. Cycle # 12 FOLFIRI/Avastin was on 01/10/2017. CEA 9.7 on 01/10/2017.       CT chest 01/23/2017 No new pulmonary nodule or lymphadenopathy. Patchy groundglass opacities within the left lower lobe, suggestive of infectious or inflammatory etiology. Followup CT chest in 3 months. Slight increased linear sclerosis along the left anterior sixth rib corresponding to an area of increased uptake on the prior bone scan from 10/21/2016, favored to be related to interval healing changes of a nondisplaced fracture. CT abdomen/pelvis on 01/23/2017 Redemonstration of multiple hepatic metastases, which are similar compared to the prior CT from 10/21/2016. Redemonstration of area of increased sclerosis along the right acetabulum suspicious for metastatic disease. Bone scan on 01/23/2017 Stable subtle uptake within the left proximal diaphysis, again suggestive of metastatic disease  Decreased subtle uptake within the medial right acetabulum. Decreased uptake within the left anterior sixth rib corresponding to linear sclerosis on the recent CT, favored to be related to an joint rib fracture.     Continue FOLFIRI + Avastin 10/31/2017. Cycle # 34 FOLFIRI + Avastin was on 11/14/2017. CEA 7.2 on 11/14/2017. Cycle # 35 FOLFIRI + Avastin was on 11/28/2017. CEA 5.1 on 11/28/2017. Cycle # 36 FOLFIRI + Avastin was on 12/12/2017. CEA 6.1 on 12/12/2017. Bone scan 12/22/2017 noted no evidence of metastatic disease to the axial or appendicular skeleton. CT chest 12/22/2017 noted no evidence of metastatic disease. CT abdomen/pelvis 12/22/2017 noted stable hypodense lesions in the liver. Continue FOLFIRI + Avastin and repeat scans in 3 months. Cycle # 37 FOLFIRI + Avastin was on 12/27/2018. Cycle # 38 FOLFIRI + Avastin was on 01/09/2018. Cycle # 39 FOLFIRI + Avastin was on 01/23/2018. Cycle # 40 FOLFIRI + Avastin was on 02/06/2018. Cycle # 41 FOLFIRI + Avastin was on 02/20/2018. Cycle # 42 FOLFIRI + Avastin was on 03/06/2018. Cycle # 43 FOLFIRI + Avastin was on 03/20/2018. Bone scan on 03/26/2018 negative for metastatic disease. CT chest 03/26/2018 noted ? new 7 mm nodule in LUCY. CT abdomen/pelvis 03/26/2018 noted stable hypodense lesions in the liver. ? New small ascites in the abdomen. Patient wants to continue to monitor the lung finding and repeat scans in 2 months instead of changing the current regimen into oral Lonsurf. Cycle # 44 FOLFIRI + Avastin was on 04/03/2018. CEA 6.3 on 04/03/2018. Cycle # 45 FOLFIRI + Avastin was on 04/24/2018. CEA 5.3 on 04/24/2018. Cycle # 46 FOLFIRI + Avastin was on 05/08/2018. CEA 5.4 on 05/08/2018. Cycle # 47 FOLFIRI + Avastin was on 05/22/2018. CEA 6.1 on 05/22/2018. CT chest 05/31/2018 noted stable nodule in LUCY. No evidence of worsening malignancy. CT abdomen/pelvis on 05/31/2018 noted stable liver metastasis. No evidence of worsening malignancy. Continue FOLFIRI + Avastin and repeat scans in 3 months. Cycle # 48 FOLFIRI + Avastin was on 06/06/2018. Cycle # 49 FOLFIRI + Avastin was on 06/19/2018. Cycle # 50 FOLFIRI + Avastin is today 07/03/2018.     Review of 01/24/2017. Cycle # 14 FOLFIRI + Avastin was on 02/07/2017. Cycle # 15 FOLFIRI + Avastin was on 02/21/2017. Cycle # 16 FOLFIRI + Avastin was on 03/07/2017. Cycle # 17 FOLFIRI + Avastin was on 03/21/2017. Cycle # 18 FOLFIRI + Avastin was on 04/04/2017. CT chest 04/17/2017 negative for metastatic disease. CT abdomen/pelvis 04/17/2017 Stable hypodense metastatic lesions within the liver. Stable area of increased sclerosis along the right acetabulum  Bone scan 04/17/2017 Stable subtle uptake within the left proximal femoral diaphysis; No definite abnormal uptake in the region of a sclerotic lesion along the posterior column of the right acetabulum noted on CT. Stable slight uptake within the left anterior sixth rib corresponding to linear sclerosis on the recent CT, favored to be related to a fracture. Continue FOLFIRI + Avastin and repeat scans in 3 months. Cycle # 19 FOLFIRI + Avastin was on 04/18/2017. CEA 7.5 on 04/18/2017. Cycle # 20 FOLFIRI + Avastin was on 05/02/2017. CEA 7.7 on 05/02/2017. Cycle # 21 FOLFIRI + Avastin was on 05/16/2017. CEA 7.1 on 05/16/2017. Cycle # 22 FOLFIRI + Avastin was on 05/30/2017. CEA 8.2 on 05/30/2017. Cycle # 23 FOLFIRI + Avastin was on 06/13/2017. CEA 7.7 on 06/13/2017. Cycle # 24 FOLFIRI + Avastin was on 06/27/2017. CEA 6.6 on 06/27/2017. Re-staging scans 07/05/2017:  Bone scan stable proximal left femoral diaphysis, right acetabulum, and left anterior sixth rib lesions;  CT abdomen/pelvis stable hypodense metastatic lesions within the liver; CT chest without convincing evidence of metastatic disease. Cycle # 25 FOLFIRI + Avastin was on 07/11/2017. CEA 6.3 on 07/11/2017. Cycle # 26 FOLFIRI + Avastin was on 07/25/2017. CEA 7.3 on 07/25/2017. Cycle # 27 FOLFIRI + Avastin was on 08/08/2017. CEA 6.5 on 08/08/2017. Cycle # 28 FOLFIRI + Avastin was on 08/22/2017. CEA 5.9 on 08/22/2017. Cycle # 29 FOLFIRI + Avastin was on 09/05/2017.   CEA 6.3 on

## 2018-07-05 ENCOUNTER — HOSPITAL ENCOUNTER (OUTPATIENT)
Dept: INFUSION THERAPY | Age: 68
Discharge: HOME | End: 2018-07-05
Payer: MEDICARE

## 2018-07-05 DIAGNOSIS — C78.7 RECTAL CANCER METASTASIZED TO LIVER (HCC): ICD-10-CM

## 2018-07-05 DIAGNOSIS — C20 RECTAL CANCER METASTASIZED TO LIVER (HCC): ICD-10-CM

## 2018-07-05 PROCEDURE — 96372 THER/PROPH/DIAG INJ SC/IM: CPT

## 2018-07-05 PROCEDURE — 6360000002 HC RX W HCPCS: Performed by: INTERNAL MEDICINE

## 2018-07-05 RX ADMIN — PEGFILGRASTIM 6 MG: 6 INJECTION SUBCUTANEOUS at 15:38

## 2018-07-06 ENCOUNTER — HOSPITAL ENCOUNTER (OUTPATIENT)
Dept: GENERAL RADIOLOGY | Age: 68
Discharge: HOME | End: 2018-07-08
Payer: MEDICARE

## 2018-07-06 DIAGNOSIS — Z85.038 HISTORY OF COLON CANCER: ICD-10-CM

## 2018-07-06 PROCEDURE — 74250 X-RAY XM SM INT 1CNTRST STD: CPT

## 2018-07-06 PROCEDURE — 2500000003 HC RX 250 WO HCPCS: Performed by: INTERNAL MEDICINE

## 2018-07-06 RX ADMIN — BARIUM SULFATE 352 G: 960 POWDER, FOR SUSPENSION ORAL at 09:25

## 2018-07-17 ENCOUNTER — OFFICE VISIT (OUTPATIENT)
Dept: ONCOLOGY | Age: 68
End: 2018-07-17
Payer: MEDICARE

## 2018-07-17 ENCOUNTER — HOSPITAL ENCOUNTER (OUTPATIENT)
Dept: INFUSION THERAPY | Age: 68
Discharge: HOME | End: 2018-07-17
Payer: MEDICARE

## 2018-07-17 VITALS
DIASTOLIC BLOOD PRESSURE: 78 MMHG | TEMPERATURE: 98.1 F | WEIGHT: 182.3 LBS | BODY MASS INDEX: 24.16 KG/M2 | HEIGHT: 73 IN | SYSTOLIC BLOOD PRESSURE: 112 MMHG | HEART RATE: 70 BPM

## 2018-07-17 VITALS — DIASTOLIC BLOOD PRESSURE: 73 MMHG | HEART RATE: 57 BPM | SYSTOLIC BLOOD PRESSURE: 126 MMHG | RESPIRATION RATE: 20 BRPM

## 2018-07-17 DIAGNOSIS — C20 RECTAL CANCER (HCC): Primary | ICD-10-CM

## 2018-07-17 DIAGNOSIS — C20 RECTAL CANCER METASTASIZED TO LIVER (HCC): ICD-10-CM

## 2018-07-17 DIAGNOSIS — E83.51 HYPOCALCEMIA: ICD-10-CM

## 2018-07-17 DIAGNOSIS — C78.7 RECTAL CANCER METASTASIZED TO LIVER (HCC): ICD-10-CM

## 2018-07-17 LAB
ALBUMIN SERPL-MCNC: 3.5 G/DL (ref 3.5–5.2)
ALP BLD-CCNC: 147 U/L (ref 40–129)
ALT SERPL-CCNC: 20 U/L (ref 0–40)
ANION GAP SERPL CALCULATED.3IONS-SCNC: 11 MMOL/L (ref 7–16)
ANISOCYTOSIS: ABNORMAL
AST SERPL-CCNC: 32 U/L (ref 0–39)
BASOPHILS ABSOLUTE: 0 E9/L (ref 0–0.2)
BASOPHILS RELATIVE PERCENT: 0 % (ref 0–2)
BILIRUB SERPL-MCNC: 0.7 MG/DL (ref 0–1.2)
BUN BLDV-MCNC: 13 MG/DL (ref 8–23)
CALCIUM SERPL-MCNC: 9.1 MG/DL (ref 8.6–10.2)
CEA: 8.9 NG/ML (ref 0–5.2)
CHLORIDE BLD-SCNC: 110 MMOL/L (ref 98–107)
CO2: 23 MMOL/L (ref 22–29)
CREAT SERPL-MCNC: 0.9 MG/DL (ref 0.7–1.2)
EOSINOPHILS ABSOLUTE: 0.19 E9/L (ref 0.05–0.5)
EOSINOPHILS RELATIVE PERCENT: 5 % (ref 0–6)
GFR AFRICAN AMERICAN: >60
GFR NON-AFRICAN AMERICAN: >60 ML/MIN/1.73
GLUCOSE BLD-MCNC: 128 MG/DL (ref 74–109)
HCT VFR BLD CALC: 29.2 % (ref 37–54)
HEMOGLOBIN: 9.5 G/DL (ref 12.5–16.5)
LYMPHOCYTES ABSOLUTE: 0.34 E9/L (ref 1.5–4)
LYMPHOCYTES RELATIVE PERCENT: 9 % (ref 20–42)
MCH RBC QN AUTO: 33.1 PG (ref 26–35)
MCHC RBC AUTO-ENTMCNC: 32.5 % (ref 32–34.5)
MCV RBC AUTO: 101.7 FL (ref 80–99.9)
MONOCYTES ABSOLUTE: 0.3 E9/L (ref 0.1–0.95)
MONOCYTES RELATIVE PERCENT: 8 % (ref 2–12)
MYELOCYTE PERCENT: 2 % (ref 0–0)
NEUTROPHILS ABSOLUTE: 2.96 E9/L (ref 1.8–7.3)
NEUTROPHILS RELATIVE PERCENT: 76 % (ref 43–80)
OVALOCYTES: ABNORMAL
PDW BLD-RTO: 17.2 FL (ref 11.5–15)
PLATELET # BLD: 108 E9/L (ref 130–450)
PMV BLD AUTO: 10.4 FL (ref 7–12)
POIKILOCYTES: ABNORMAL
POLYCHROMASIA: ABNORMAL
POTASSIUM SERPL-SCNC: 3.6 MMOL/L (ref 3.5–5)
RBC # BLD: 2.87 E12/L (ref 3.8–5.8)
SODIUM BLD-SCNC: 144 MMOL/L (ref 132–146)
TEAR DROP CELLS: ABNORMAL
TOTAL PROTEIN: 6.1 G/DL (ref 6.4–8.3)
WBC # BLD: 3.8 E9/L (ref 4.5–11.5)

## 2018-07-17 PROCEDURE — 80053 COMPREHEN METABOLIC PANEL: CPT

## 2018-07-17 PROCEDURE — 85025 COMPLETE CBC W/AUTO DIFF WBC: CPT

## 2018-07-17 PROCEDURE — 96360 HYDRATION IV INFUSION INIT: CPT

## 2018-07-17 PROCEDURE — 96361 HYDRATE IV INFUSION ADD-ON: CPT

## 2018-07-17 PROCEDURE — 2580000003 HC RX 258: Performed by: NURSE PRACTITIONER

## 2018-07-17 PROCEDURE — 82378 CARCINOEMBRYONIC ANTIGEN: CPT

## 2018-07-17 PROCEDURE — 6360000002 HC RX W HCPCS: Performed by: NURSE PRACTITIONER

## 2018-07-17 RX ORDER — 0.9 % SODIUM CHLORIDE 0.9 %
2000 INTRAVENOUS SOLUTION INTRAVENOUS ONCE
Status: CANCELLED | OUTPATIENT
Start: 2018-07-17 | End: 2018-07-17

## 2018-07-17 RX ORDER — HEPARIN SODIUM (PORCINE) LOCK FLUSH IV SOLN 100 UNIT/ML 100 UNIT/ML
500 SOLUTION INTRAVENOUS PRN
Status: CANCELLED | OUTPATIENT
Start: 2018-07-17

## 2018-07-17 RX ORDER — 0.9 % SODIUM CHLORIDE 0.9 %
2000 INTRAVENOUS SOLUTION INTRAVENOUS ONCE
Status: COMPLETED | OUTPATIENT
Start: 2018-07-17 | End: 2018-07-17

## 2018-07-17 RX ORDER — SODIUM CHLORIDE 0.9 % (FLUSH) 0.9 %
10 SYRINGE (ML) INJECTION PRN
Status: CANCELLED | OUTPATIENT
Start: 2018-07-17

## 2018-07-17 RX ORDER — SODIUM CHLORIDE 0.9 % (FLUSH) 0.9 %
10 SYRINGE (ML) INJECTION PRN
Status: DISCONTINUED | OUTPATIENT
Start: 2018-07-17 | End: 2018-07-18 | Stop reason: HOSPADM

## 2018-07-17 RX ORDER — HEPARIN SODIUM (PORCINE) LOCK FLUSH IV SOLN 100 UNIT/ML 100 UNIT/ML
500 SOLUTION INTRAVENOUS PRN
Status: DISCONTINUED | OUTPATIENT
Start: 2018-07-17 | End: 2018-07-18 | Stop reason: HOSPADM

## 2018-07-17 RX ADMIN — Medication 500 UNITS: at 11:23

## 2018-07-17 RX ADMIN — SODIUM CHLORIDE 2000 ML: 9 INJECTION, SOLUTION INTRAVENOUS at 09:09

## 2018-07-17 RX ADMIN — Medication 10 ML: at 11:23

## 2018-07-17 NOTE — PROGRESS NOTES
ONCOLOGY  NP VISIT         7/17/2018      NAME:  Danya Irene    YOB: 1950      ALLERGIES:  Neosporin [neomycin-polymyxin-gramicidin] and Tape Maui Layman tape]         Current Outpatient Prescriptions   Medication Sig Dispense Refill    potassium chloride (KLOR-CON M) 20 MEQ extended release tablet Take 1 tablet by mouth daily Take 2 times daily for 7 days, then Once daily. 60 tablet 2    hydrochlorothiazide (HYDRODIURIL) 25 MG tablet Take 1 tablet by mouth daily 30 tablet 3    nystatin (MYCOSTATIN) 791296 UNIT/ML suspension Take 5 mLs by mouth 4 times daily 473 mL 2    ondansetron (ZOFRAN) 4 MG tablet Take 1 tablet by mouth every 8 hours as needed for Nausea or Vomiting 40 tablet 1    lactulose (CHRONULAC) 10 GM/15ML solution Take by mouth daily      hydrocortisone (ANUSOL-HC) 2.5 % rectal cream Use 3-4 times daily. Do not use internally. External use only. 1 Tube 2    acetaminophen (TYLENOL) 500 MG tablet Take 500 mg by mouth every 6 hours as needed for Pain      pantoprazole (PROTONIX) 40 MG tablet Take 1 tablet by mouth 2 times daily (before meals) Before breakfast and supper 60 tablet 2    Loratadine 10 MG CAPS Take by mouth daily      atenolol (TENORMIN) 50 MG tablet 2 times daily   1     No current facility-administered medications for this visit. SUBJECTIVE:    Here for chemotherapy and follow-up. Feels fair today. Has had diarrhea the past 7-10 days. Has had 5 episodes this morning already. Uses Lomotil alternating with Imodium. Had Small Bowel X-ray ordered by Nina Varma;  revealed Right Inguinal hernia. OBJECTIVE:   Physical Examination:  Performance Status 0  /78 (Site: Left Arm, Position: Sitting, Cuff Size: Medium Adult)   Pulse 70   Temp 98.1 °F (36.7 °C) (Temporal)   Ht 6' 1\" (1.854 m)   Wt 182 lb 4.8 oz (82.7 kg)   BMI 24.05 kg/m²   GENERAL: Alert, oriented x 3, not in acute distress. HEENT: PERRLA, EOMI. No oral lesions.   NECK: noted Interval decreased size of multiple masses in the liver compatible with treatment response. Continue another 2 months of FOLFOX + Avastin and repeat scans. Cycle # 6 of FOLFOX + Avastin was on 02/02/2016. Cycle # 11 of FOLFOX + Avastin was on 04/12/2016.      Re-staging scans on 04/19/2016: CT Chest: clear lungs; no evidence of recurring pulmonary nodule; CT Abdomen/Pelvis: Further interval decrease in size of the multiple metastatic hepatic lesions, the largest lesion now measures 3.9 x 3.5 cm and previously measured 4.5 cm in maximum diameter. No mesenteric lymphadenopathy is identified; Bone Scan: No evidence of osseous metastasis. Continue same regimen and re-stage in 2-3 months. Cycle # 12 FOLFOX + Avastin was on 04/26/2016. Cycle # 15 FOLFOX + Avastin was on 06/07/2016. .   Admitted to [facility] 06/13/2016-06/16/2016 for abdominal pain: EGD noted 1.5 cm clean based duodenal bulb ulceration s/p epinephrine and bicap per Dr. Vidal Luna. No active bleeding. A. Stomach, biopsy: Mild chronic gastritis, immunostain negative for Helicobacter  B. Esophagus, biopsy: Gastric glandular mucosa with prominent ntestinal metaplasia ([place]'s epithelium), negative for epithelial dysplasia, esophageal squamous mucosa not identified     Cycle # 16 FOLFOX (discontinued avastin (bevacizumab) given association of peptic ulcer disease and known association of GI perforation.) was on 06/21/2016  Cycle # 17 FOLFOX was on 07/05/2016.      Re-staging scans 07/19/2106: CT Chest negative for metastatic disease. CT Abdomen/Pelvis: Stable hepatic lesions; Question new mural thickening in the cecum. Bone Scan: New Let hip lesion suspicious for bone metastasis.     Increased CEA; new mural thickening in the cecum and new left hip lesion suspicious for bone metastasis are consistent with disease progression; He derived maximum benefit from FOLFOX/AVASTIN. D/C FOLFOX/AVASTIN. We recommended FOLFIRI second line therapy.    Cycle # 1 FOLFIRI was on 08/02/2016. Xgeva q4 weeks started on 08/02/2016. Cycle # 3 FOLFIRI (added Avastin) was on 08/30/2016 given that ulcers healed on EGD 08/15/2016 by Dr. Leobardo German; Protonix bid since avastin re-started. Cycle # 6 FOLFIRI/Avastin was on 10/11/2016. CEA 18.4 on 10/11/2016.      Bone scan 10/21/2016 stable bone metastasis; ? New lesion anterior left sixth rib likely interval healing fracture. CT abdomen/pelvis revealed stable hepatic metastasis. CT chest negative for metastatic disease. Continue FOLFIRI/Avastin and repeat scans in 3 months. Cycle # 7 FOLFIRI/Avastin was on 10/25/2016. Cycle # 12 FOLFIRI/Avastin was on 01/10/2017.      CT chest 01/23/2017 No new pulmonary nodule or lymphadenopathy. Patchy groundglass opacities within the left lower lobe, suggestive of infectious or inflammatory etiology. Followup CT chest in 3 months. Slight increased linear sclerosis along the left anterior sixth rib corresponding to an area of increased uptake on the prior bone scan from 10/21/2016, favored to be related to interval healing changes of a nondisplaced fracture. CT abdomen/pelvis on 01/23/2017 Redemonstration of multiple hepatic metastases, which are similar compared to the prior CT from 10/21/2016. Redemonstration of area of increased sclerosis along the right acetabulum suspicious for metastatic disease. Bone scan on 01/23/2017 Stable subtle uptake within the left proximal diaphysis, again suggestive of metastatic disease  Decreased subtle uptake within the medial right acetabulum. Decreased uptake within the left anterior sixth rib corresponding to linear sclerosis on the recent CT, favored to be related to an joint rib fracture.     Continue FOLFIRI + Avastin and repeat scans in 3 months. Cycle # 13 FOLFIRI + Avastin was on 01/24/2017. Cycle # 14 FOLFIRI + Avastin was on 02/07/2017. Cycle # 18 FOLFIRI + Avastin was on 04/04/2017. CT chest 04/17/2017 negative for metastatic disease. CT abdomen/pelvis 04/17/2017 Stable hypodense metastatic lesions within the liver. Stable area of increased sclerosis along the right acetabulum  Bone scan 04/17/2017 Stable subtle uptake within the left proximal femoral diaphysis; No definite abnormal uptake in the region of a sclerotic lesion along the posterior column of the right acetabulum noted on CT. Stable slight uptake within the left anterior sixth rib corresponding to linear sclerosis on the recent CT, favored to be related to a fracture. Continue FOLFIRI + Avastin and repeat scans in 3 months. Cycle # 19 FOLFIRI + Avastin was on 04/18/2017. Cycle # 24 FOLFIRI + Avastin was on 06/27/2017. Re-staging scans 07/05/2017:  Bone scan stable proximal left femoral diaphysis, right acetabulum, and left anterior sixth rib lesions;  CT abdomen/pelvis stable hypodense metastatic lesions within the liver; CT chest without convincing evidence of metastatic disease. Cycle # 25 FOLFIRI + Avastin was on 07/11/2017. Cycle # 30 FOLFIRI + Avastin was on 09/19/2017. Bone scan 09/26/2017 stable. CT abdomen/pelvis on 09/26/2017 Stable hypodense mass in the liver. Stable sclerotic lesion in the acetabulum. CT chest 09/26/2017 negative for metastatic disease. Cycle # 31 FOLFIRI + Avastin was on 10/03/2017. Cycle # 36 FOLFIRI + Avastin was on 12/12/2017. Bone scan 12/22/2017 noted no evidence of metastatic disease to the axial or appendicular skeleton. CT chest 12/22/2017 noted no evidence of metastatic disease. CT abdomen/pelvis 12/22/2017 noted stable hypodense lesions in the liver. Continue FOLFIRI + Avastin and repeat scans in 3 months. Cycle # 37 FOLFIRI + Avastin was on 12/27/2018. Cycle # 43 FOLFIRI + Avastin was on 03/20/2018. CEA 5.7 on 03/20/2018. Bone scan on 03/26/2018 negative for metastatic disease. CT chest 03/26/2018 noted ? new 7 mm nodule in LUCY. CT abdomen/pelvis 03/26/2018 noted stable hypodense lesions in the liver.  ? New small ascites in the abdomen. Patient wants to continue to monitor the lung finding and repeat scans in 2 months instead of changing the current chemotherapy regimen to oral Lonsurf. Cycle # 44 FOLFIRI + Avastin was on 04/03/2018. CEA 6.3 on 04/03/2018. Cycle # 45 FOLFIRI + Avastin was on 04/24/2018. CEA 5.3 on 04/24/2018. Cycle # 46 FOLFIRI + Avastin was on 05/08/2018. CEA 5.4 on 05/08/2018. Cycle # 47 FOLFIRI + Avastin was on 05/22/2018. CEA 6.1 on 05/22/2018. CT chest 05/31/2018 noted stable nodule in LUCY. No evidence of worsening malignancy. CT abdomen/pelvis on 05/31/2018 noted stable liver metastasis. No evidence of worsening malignancy. Continue FOLFIRI + Avastin and repeat scans in 3 months. Cycle # 48 FOLFIRI + Avastin was on 06/06/2018. Cycle # 49 FOLFIRI + Avastin was on 06/19/2018. Cycle # 50 FOLFIRI + Avastin was on  07/03/2018.      MSI testing noted no mismatch repair protein loss of expression        PLAN:   Cycle #51 FOLFIRI and Avastin was postponed  today---7/17/17. Due to severe diarrhea, after discussing his case with Dr. Ngoc Pacheco. Will give IV hydration;  2L NACL over 2 hours.       RTC 1 weeks----7/24/18  for follow up with Dr. Nora Ellis,  Chemotherapy --- [address] [place] [address], [address] [place] [address] Road:  [phone]

## 2018-07-24 ENCOUNTER — HOSPITAL ENCOUNTER (OUTPATIENT)
Dept: INFUSION THERAPY | Age: 68
Discharge: HOME | End: 2018-07-24
Payer: MEDICARE

## 2018-07-24 ENCOUNTER — OFFICE VISIT (OUTPATIENT)
Dept: ONCOLOGY | Age: 68
End: 2018-07-24
Payer: MEDICARE

## 2018-07-24 VITALS
TEMPERATURE: 98.5 F | WEIGHT: 196.4 LBS | DIASTOLIC BLOOD PRESSURE: 67 MMHG | HEIGHT: 73 IN | HEART RATE: 57 BPM | SYSTOLIC BLOOD PRESSURE: 109 MMHG | BODY MASS INDEX: 26.03 KG/M2 | RESPIRATION RATE: 20 BRPM

## 2018-07-24 VITALS — SYSTOLIC BLOOD PRESSURE: 150 MMHG | HEART RATE: 60 BPM | RESPIRATION RATE: 20 BRPM | DIASTOLIC BLOOD PRESSURE: 80 MMHG

## 2018-07-24 DIAGNOSIS — E83.51 HYPOCALCEMIA: ICD-10-CM

## 2018-07-24 DIAGNOSIS — C18.9 MALIGNANT NEOPLASM OF COLON, UNSPECIFIED PART OF COLON (HCC): Primary | ICD-10-CM

## 2018-07-24 DIAGNOSIS — C20 RECTAL CANCER METASTASIZED TO LIVER (HCC): ICD-10-CM

## 2018-07-24 DIAGNOSIS — C78.7 RECTAL CANCER METASTASIZED TO LIVER (HCC): ICD-10-CM

## 2018-07-24 DIAGNOSIS — C79.51 BONE METASTASIS (HCC): ICD-10-CM

## 2018-07-24 LAB
ALBUMIN SERPL-MCNC: 3.1 G/DL (ref 3.5–5.2)
ALP BLD-CCNC: 217 U/L (ref 40–129)
ALT SERPL-CCNC: 26 U/L (ref 0–40)
ANION GAP SERPL CALCULATED.3IONS-SCNC: 6 MMOL/L (ref 7–16)
ANISOCYTOSIS: ABNORMAL
AST SERPL-CCNC: 41 U/L (ref 0–39)
BASOPHILS ABSOLUTE: 0.06 E9/L (ref 0–0.2)
BASOPHILS RELATIVE PERCENT: 1 % (ref 0–2)
BILIRUB SERPL-MCNC: 0.6 MG/DL (ref 0–1.2)
BUN BLDV-MCNC: 13 MG/DL (ref 8–23)
CALCIUM SERPL-MCNC: 8.9 MG/DL (ref 8.6–10.2)
CEA: 6.7 NG/ML (ref 0–5.2)
CHLORIDE BLD-SCNC: 108 MMOL/L (ref 98–107)
CO2: 26 MMOL/L (ref 22–29)
CREAT SERPL-MCNC: 0.8 MG/DL (ref 0.7–1.2)
EOSINOPHILS ABSOLUTE: 0.2 E9/L (ref 0.05–0.5)
EOSINOPHILS RELATIVE PERCENT: 3.5 % (ref 0–6)
GFR AFRICAN AMERICAN: >60
GFR NON-AFRICAN AMERICAN: >60 ML/MIN/1.73
GLUCOSE BLD-MCNC: 99 MG/DL (ref 74–109)
HCT VFR BLD CALC: 27.3 % (ref 37–54)
HEMOGLOBIN: 8.9 G/DL (ref 12.5–16.5)
LYMPHOCYTES ABSOLUTE: 0.45 E9/L (ref 1.5–4)
LYMPHOCYTES RELATIVE PERCENT: 7.5 % (ref 20–42)
MCH RBC QN AUTO: 33.2 PG (ref 26–35)
MCHC RBC AUTO-ENTMCNC: 32.6 % (ref 32–34.5)
MCV RBC AUTO: 101.9 FL (ref 80–99.9)
MONOCYTES ABSOLUTE: 0.34 E9/L (ref 0.1–0.95)
MONOCYTES RELATIVE PERCENT: 6 % (ref 2–12)
MYELOCYTE PERCENT: 0.5 % (ref 0–0)
NEUTROPHILS ABSOLUTE: 4.59 E9/L (ref 1.8–7.3)
NEUTROPHILS RELATIVE PERCENT: 81.4 % (ref 43–80)
OVALOCYTES: ABNORMAL
PDW BLD-RTO: 17.7 FL (ref 11.5–15)
PLATELET # BLD: 116 E9/L (ref 130–450)
PMV BLD AUTO: 11.7 FL (ref 7–12)
POIKILOCYTES: ABNORMAL
POLYCHROMASIA: ABNORMAL
POTASSIUM SERPL-SCNC: 4.6 MMOL/L (ref 3.5–5)
RBC # BLD: 2.68 E12/L (ref 3.8–5.8)
SODIUM BLD-SCNC: 140 MMOL/L (ref 132–146)
TEAR DROP CELLS: ABNORMAL
TOTAL PROTEIN: 5.7 G/DL (ref 6.4–8.3)
WBC # BLD: 5.6 E9/L (ref 4.5–11.5)

## 2018-07-24 PROCEDURE — 96368 THER/DIAG CONCURRENT INF: CPT

## 2018-07-24 PROCEDURE — 80053 COMPREHEN METABOLIC PANEL: CPT

## 2018-07-24 PROCEDURE — 6360000002 HC RX W HCPCS: Performed by: INTERNAL MEDICINE

## 2018-07-24 PROCEDURE — 96375 TX/PRO/DX INJ NEW DRUG ADDON: CPT

## 2018-07-24 PROCEDURE — 96372 THER/PROPH/DIAG INJ SC/IM: CPT

## 2018-07-24 PROCEDURE — 96411 CHEMO IV PUSH ADDL DRUG: CPT

## 2018-07-24 PROCEDURE — 96415 CHEMO IV INFUSION ADDL HR: CPT

## 2018-07-24 PROCEDURE — 96417 CHEMO IV INFUS EACH ADDL SEQ: CPT

## 2018-07-24 PROCEDURE — 96413 CHEMO IV INFUSION 1 HR: CPT

## 2018-07-24 PROCEDURE — 2580000003 HC RX 258: Performed by: INTERNAL MEDICINE

## 2018-07-24 PROCEDURE — 85025 COMPLETE CBC W/AUTO DIFF WBC: CPT

## 2018-07-24 PROCEDURE — 99214 OFFICE O/P EST MOD 30 MIN: CPT | Performed by: INTERNAL MEDICINE

## 2018-07-24 PROCEDURE — 82378 CARCINOEMBRYONIC ANTIGEN: CPT

## 2018-07-24 RX ORDER — SODIUM CHLORIDE 9 MG/ML
250 INJECTION, SOLUTION INTRAVENOUS CONTINUOUS
Status: DISCONTINUED | OUTPATIENT
Start: 2018-07-24 | End: 2018-07-25 | Stop reason: HOSPADM

## 2018-07-24 RX ORDER — SODIUM CHLORIDE 9 MG/ML
INJECTION, SOLUTION INTRAVENOUS CONTINUOUS
Status: CANCELLED | OUTPATIENT
Start: 2018-07-24

## 2018-07-24 RX ORDER — FLUOROURACIL 50 MG/ML
850 INJECTION, SOLUTION INTRAVENOUS ONCE
Status: COMPLETED | OUTPATIENT
Start: 2018-07-24 | End: 2018-07-24

## 2018-07-24 RX ORDER — PALONOSETRON 0.05 MG/ML
0.25 INJECTION, SOLUTION INTRAVENOUS ONCE
Status: COMPLETED | OUTPATIENT
Start: 2018-07-24 | End: 2018-07-24

## 2018-07-24 RX ORDER — SODIUM CHLORIDE 0.9 % (FLUSH) 0.9 %
10 SYRINGE (ML) INJECTION PRN
Status: CANCELLED | OUTPATIENT
Start: 2018-07-24

## 2018-07-24 RX ORDER — HEPARIN SODIUM (PORCINE) LOCK FLUSH IV SOLN 100 UNIT/ML 100 UNIT/ML
500 SOLUTION INTRAVENOUS PRN
Status: CANCELLED | OUTPATIENT
Start: 2018-07-24

## 2018-07-24 RX ORDER — FLUOROURACIL 50 MG/ML
850 INJECTION, SOLUTION INTRAVENOUS ONCE
Status: CANCELLED | OUTPATIENT
Start: 2018-07-24

## 2018-07-24 RX ORDER — DEXAMETHASONE SODIUM PHOSPHATE 10 MG/ML
10 INJECTION, SOLUTION INTRAMUSCULAR; INTRAVENOUS ONCE
Status: COMPLETED | OUTPATIENT
Start: 2018-07-24 | End: 2018-07-24

## 2018-07-24 RX ORDER — METHYLPREDNISOLONE SODIUM SUCCINATE 125 MG/2ML
125 INJECTION, POWDER, LYOPHILIZED, FOR SOLUTION INTRAMUSCULAR; INTRAVENOUS ONCE
Status: CANCELLED | OUTPATIENT
Start: 2018-07-24 | End: 2018-07-24

## 2018-07-24 RX ORDER — HEPARIN SODIUM (PORCINE) LOCK FLUSH IV SOLN 100 UNIT/ML 100 UNIT/ML
500 SOLUTION INTRAVENOUS PRN
Status: DISCONTINUED | OUTPATIENT
Start: 2018-07-24 | End: 2018-07-25 | Stop reason: HOSPADM

## 2018-07-24 RX ORDER — PALONOSETRON 0.05 MG/ML
0.25 INJECTION, SOLUTION INTRAVENOUS ONCE
Status: CANCELLED | OUTPATIENT
Start: 2018-07-24

## 2018-07-24 RX ORDER — ATROPINE SULFATE 0.4 MG/ML
0.4 AMPUL (ML) INJECTION ONCE
Status: DISCONTINUED | OUTPATIENT
Start: 2018-07-24 | End: 2018-07-24

## 2018-07-24 RX ORDER — 0.9 % SODIUM CHLORIDE 0.9 %
10 VIAL (ML) INJECTION ONCE
Status: CANCELLED | OUTPATIENT
Start: 2018-07-24 | End: 2018-07-24

## 2018-07-24 RX ORDER — SODIUM CHLORIDE 9 MG/ML
250 INJECTION, SOLUTION INTRAVENOUS CONTINUOUS
Status: CANCELLED | OUTPATIENT
Start: 2018-07-24 | End: 2018-08-11

## 2018-07-24 RX ORDER — SODIUM CHLORIDE 0.9 % (FLUSH) 0.9 %
10 SYRINGE (ML) INJECTION PRN
Status: DISCONTINUED | OUTPATIENT
Start: 2018-07-24 | End: 2018-07-25 | Stop reason: HOSPADM

## 2018-07-24 RX ORDER — ATROPINE SULFATE 0.4 MG/ML
0.4 AMPUL (ML) INJECTION ONCE
Status: CANCELLED | OUTPATIENT
Start: 2018-07-24 | End: 2018-07-24

## 2018-07-24 RX ORDER — DIPHENHYDRAMINE HYDROCHLORIDE 50 MG/ML
50 INJECTION INTRAMUSCULAR; INTRAVENOUS ONCE
Status: CANCELLED | OUTPATIENT
Start: 2018-07-24 | End: 2018-07-24

## 2018-07-24 RX ADMIN — DENOSUMAB 120 MG: 120 INJECTION SUBCUTANEOUS at 13:14

## 2018-07-24 RX ADMIN — Medication 10 ML: at 13:16

## 2018-07-24 RX ADMIN — PALONOSETRON 0.25 MG: 0.05 INJECTION, SOLUTION INTRAVENOUS at 11:15

## 2018-07-24 RX ADMIN — Medication 500 UNITS: at 13:16

## 2018-07-24 RX ADMIN — SODIUM CHLORIDE 400 MG: 0.9 INJECTION, SOLUTION INTRAVENOUS at 11:27

## 2018-07-24 RX ADMIN — FLUOROURACIL 850 MG: 50 INJECTION, SOLUTION INTRAVENOUS at 13:13

## 2018-07-24 RX ADMIN — Medication 10 ML: at 08:33

## 2018-07-24 RX ADMIN — SODIUM CHLORIDE 250 ML: 9 INJECTION, SOLUTION INTRAVENOUS at 10:33

## 2018-07-24 RX ADMIN — Medication 10 ML: at 08:30

## 2018-07-24 RX ADMIN — LEUCOVORIN CALCIUM 850 MG: 350 INJECTION, POWDER, LYOPHILIZED, FOR SOLUTION INTRAMUSCULAR; INTRAVENOUS at 11:27

## 2018-07-24 RX ADMIN — BEVACIZUMAB 400 MG: 400 INJECTION, SOLUTION INTRAVENOUS at 10:33

## 2018-07-24 RX ADMIN — DEXAMETHASONE SODIUM PHOSPHATE 10 MG: 10 INJECTION, SOLUTION INTRAMUSCULAR; INTRAVENOUS at 11:15

## 2018-07-24 NOTE — PROGRESS NOTES
3488. Bone scan on 11/10/2015 noted no metastatic disease. CT chest on 11/10/2015 revealed Multiple pulmonary nodules in the upper and lower lobes consistent with metastatic disease;   Hepatic metastasis also visualized. 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 Mediport was placed. Cycle # 1 of FOLFOX + Avastin was on 11/23/2015. CEA was 4555 on 11/23/2015. Cycle # 2 of FOLFOX + Avastin was on 12/08/2015. CEA was 3160 on 12/08/2015. Cycle # 3 of FOLFOX + Avastin was on 12/22/2015. CEA was 2516 on 12/21/2015. Cycle # 4 of FOLFOX + Avastin was on 01/05/2016. CEA was 2098 on 01/05/2015. Cycle # 5 of FOLFOX + Avastin was on 01/19/2016. CEA was 1511 on 01/05/2015.     -Bone scan on 01/26/2016 noted no metastatic disease. CT chest on 01/26/2016 revealed Significant response to treatment with no visible residual nodules. CT scan abdomen/pelvis on 01/26/2016 noted Interval decreased size of multiple masses in the liver compatible with treatment response. Continue another 2 months of FOLFOX + Avastin and repeat scans. Cycle # 6 of FOLFOX + Avastin was on 02/02/2016.  on 02/02/2016. Cycle # 7 of FOLFOX + Avastin was on 02/16/2016.  on 02/16/2016. Cycle # 8 of FOLFOX + Avastin was on 03/01/2016.  on 03/01/2016. Cycle # 9 of FOLFOX + Avastin was on 03/15/2016.  on 03/15/2016. Cycle # 10 of FOLFOX + Avastin was on 03/29/2016. .4 on 03/29/2016. Cycle # 11 of FOLFOX + Avastin was on 04/12/2016. .4 on 04/12/2016.     Re-staging scans on 04/19/2016: CT Chest: clear lungs; no evidence of recurring pulmonary nodule; CT Abdomen/Pelvis: Further interval decrease in size of the multiple metastatic hepatic lesions, the largest lesion now measures 3.9 x 3.5 cm and previously measured 4.5 cm in maximum diameter. No mesenteric lymphadenopathy is identified; Bone Scan: No evidence of osseous metastasis.   Continue same regimen and and repeat scans in 3 months. Cycle # 13 FOLFIRI + Avastin was on 01/24/2017. Cycle # 14 FOLFIRI + Avastin was on 02/07/2017. Cycle # 15 FOLFIRI + Avastin was on 02/21/2017. Cycle # 16 FOLFIRI + Avastin was on 03/07/2017. Cycle # 17 FOLFIRI + Avastin was on 03/21/2017. Cycle # 18 FOLFIRI + Avastin was on 04/04/2017. CT chest 04/17/2017 negative for metastatic disease. CT abdomen/pelvis 04/17/2017 Stable hypodense metastatic lesions within the liver. Stable area of increased sclerosis along the right acetabulum  Bone scan 04/17/2017 Stable subtle uptake within the left proximal femoral diaphysis; No definite abnormal uptake in the region of a sclerotic lesion along the posterior column of the right acetabulum noted on CT. Stable slight uptake within the left anterior sixth rib corresponding to linear sclerosis on the recent CT, favored to be related to a fracture. Continue FOLFIRI + Avastin and repeat scans in 3 months. Cycle # 19 FOLFIRI + Avastin was on 04/18/2017. Cycle # 20 FOLFIRI + Avastin was on 05/02/2017. Cycle # 21 FOLFIRI + Avastin was on 05/16/2017. Cycle # 22 FOLFIRI + Avastin was on 05/30/2017. Cycle # 23 FOLFIRI + Avastin was on 06/13/2017. Cycle # 24 FOLFIRI + Avastin was on 06/27/2017. Cycle # 25 FOLFIRI + Avastin was on 07/11/2017. Cycle # 26 FOLFIRI + Avastin was on 07/25/2017. Cycle # 27 FOLFIRI + Avastin was on 08/08/2017. Cycle # 28 FOLFIRI + Avastin was on 08/22/2017. Cycle # 29 FOLFIRI + Avastin was on 09/05/2017. Cycle # 30 FOLFIRI + Avastin was on 09/19/2017. Bone scan 09/26/2017 stable. CT abdomen/pelvis on 09/26/2017 Stable hypodense mass in the liver. Stable sclerotic lesion in the acetabulum. CT chest 09/26/2017 negative for metastatic disease. Cycle # 31 FOLFIRI + Avastin was on 10/03/2017. CEA 7.4 on 10/03/2017. Cycle # 32 FOLFIRI + Avastin was on 10/17/2017. CEA 6.0 on 10/17/2017. Cycle # 33 FOLFIRI + Avastin was on 10/31/2017.  CEA 6.8 on 10/31/2017. Cycle # 34 FOLFIRI + Avastin was on 11/14/2017. CEA 7.2 on 11/14/2017. Cycle # 35 FOLFIRI + Avastin was on 11/28/2017. CEA 5.1 on 11/28/2017. Cycle # 36 FOLFIRI + Avastin was on 12/12/2017. CEA 6.1 on 12/12/2017. Bone scan 12/22/2017 noted no evidence of metastatic disease to the axial or appendicular skeleton. CT chest 12/22/2017 noted no evidence of metastatic disease. CT abdomen/pelvis 12/22/2017 noted stable hypodense lesions in the liver. Continue FOLFIRI + Avastin and repeat scans in 3 months. Cycle # 37 FOLFIRI + Avastin was on 12/27/2018. Cycle # 38 FOLFIRI + Avastin was on 01/09/2018. Cycle # 39 FOLFIRI + Avastin was on 01/23/2018. Cycle # 40 FOLFIRI + Avastin was on 02/06/2018. Cycle # 41 FOLFIRI + Avastin was on 02/20/2018. Cycle # 42 FOLFIRI + Avastin was on 03/06/2018. Cycle # 43 FOLFIRI + Avastin was on 03/20/2018. Bone scan on 03/26/2018 negative for metastatic disease. CT chest 03/26/2018 noted ? new 7 mm nodule in LUCY. CT abdomen/pelvis 03/26/2018 noted stable hypodense lesions in the liver. ? New small ascites in the abdomen. Patient wants to continue to monitor the lung finding and repeat scans in 2 months instead of changing the current regimen into oral Lonsurf. Cycle # 44 FOLFIRI + Avastin was on 04/03/2018. CEA 6.3 on 04/03/2018. Cycle # 45 FOLFIRI + Avastin was on 04/24/2018. CEA 5.3 on 04/24/2018. Cycle # 46 FOLFIRI + Avastin was on 05/08/2018. CEA 5.4 on 05/08/2018. Cycle # 47 FOLFIRI + Avastin was on 05/22/2018. CEA 6.1 on 05/22/2018. CT chest 05/31/2018 noted stable nodule in LUCY. No evidence of worsening malignancy. CT abdomen/pelvis on 05/31/2018 noted stable liver metastasis. No evidence of worsening malignancy. Continue FOLFIRI + Avastin and repeat scans in 3 months. Cycle # 48 FOLFIRI + Avastin was on 06/06/2018. Cycle # 49 FOLFIRI + Avastin was on 06/19/2018. Cycle # 50 FOLFIRI + Avastin was on 07/03/2018.     Review of disease. CT chest on 11/10/2015 revealed Multiple pulmonary nodules in the upper and lower lobes consistent with metastatic disease; Hepatic metastasis also visualized. 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 Mediport was placed. Cycle # 1 of FOLFOX + Avastin was on 11/23/2015. CEA was 4555 on 11/23/2015. Cycle # 2 of FOLFOX + Avastin was on 12/08/2015. CEA was 3160 on 12/08/2015. Cycle # 3 of FOLFOX + Avastin was on 12/22/2015. CEA was 2516 on 12/21/2015. Cycle # 4 of FOLFOX + Avastin was on 01/05/2016. CEA was 2098 on 01/05/2015. Cycle # 5 of FOLFOX + Avastin was on 01/19/2016. CEA was 1511 on 01/05/2015.     -Bone scan on 01/26/2016 noted no metastatic disease. CT chest on 01/26/2016 revealed Significant response to treatment with no visible residual nodules. CT scan abdomen/pelvis on 01/26/2016 noted Interval decreased size of multiple masses in the liver compatible with treatment response. Continue another 2 months of FOLFOX + Avastin and repeat scans. Cycle # 6 of FOLFOX + Avastin was on 02/02/2016.  on 02/02/2016. Cycle # 7 of FOLFOX + Avastin was on 02/16/2016.  on 02/16/2016. Cycle # 8 of FOLFOX + Avastin was on 03/01/2016.  on 03/01/2016. Cycle # 9 of FOLFOX + Avastin was on 03/15/2016.  on 03/15/2016. Cycle # 10 of FOLFOX + Avastin was on 03/29/2016. .4 on 03/29/2016. Cycle # 11 of FOLFOX + Avastin was on 04/12/2016. .4 on 04/12/2016.     Re-staging scans on 04/19/2016: CT Chest: clear lungs; no evidence of recurring pulmonary nodule; CT Abdomen/Pelvis: Further interval decrease in size of the multiple metastatic hepatic lesions, the largest lesion now measures 3.9 x 3.5 cm and previously measured 4.5 cm in maximum diameter. No mesenteric lymphadenopathy is identified; Bone Scan: No evidence of osseous metastasis. Continue same regimen and re-stage in 2-3 months.    Cycle # 12 FOLFOX + Avastin was FOLFIRI/Avastin was on 09/27/2016. CEA 22.3 on 09/27/2016. Cycle # 6 FOLFIRI/Avastin was on 10/11/2016. CEA 18.4 on 10/11/2016.      Bone scan 10/21/2016 stable bone metastasis; ? New lesion anterior left sixth rib likely interval healing fracture. CT abdomen/pelvis revealed stable hepatic metastasis. CT chest negative for metastatic disease. Continue FOLFIRI/Avastin and repeat scans in 3 months. Cycle # 7 FOLFIRI/Avastin was on 10/25/2016. CEA 16.1 on 10/25/2016. Cycle # 8 FOLFIRI/Avastin was on 11/08/2016. CEA 15.7 on 11/08/2016. Cycle # 9 FOLFIRI/Avastin was on 11/29/2016. CEA 13.6 on 11/29/2016. Cycle # 10 FOLFIRI/Avastin was on 12/13/2016. CEA 10.6 on 12/13/2016. Cycle # 11 FOLFIRI/Avastin was on 12/27/2016. CEA 11.1 on 12/27/2016. Cycle # 12 FOLFIRI/Avastin was on 01/10/2017. CEA 9.7 on 01/10/2017. CT chest 01/23/2017 No new pulmonary nodule or lymphadenopathy. Patchy groundglass opacities within the left lower lobe, suggestive of infectious or inflammatory etiology. Followup CT chest in 3 months. Slight increased linear sclerosis along the left anterior sixth rib corresponding to an area of increased uptake on the prior bone scan from 10/21/2016, favored to be related to interval healing changes of a nondisplaced fracture. CT abdomen/pelvis on 01/23/2017 Redemonstration of multiple hepatic metastases, which are similar compared to the prior CT from 10/21/2016. Redemonstration of area of increased sclerosis along the right acetabulum suspicious for metastatic disease. Bone scan on 01/23/2017 Stable subtle uptake within the left proximal diaphysis, again suggestive of metastatic disease  Decreased subtle uptake within the medial right acetabulum. Decreased uptake within the left anterior sixth rib corresponding to linear sclerosis on the recent CT, favored to be related to an joint rib fracture. Continue FOLFIRI + Avastin and repeat scans in 3 months.   Cycle # 13 FOLFIRI +

## 2018-07-24 NOTE — PROGRESS NOTES
Patient presents to clinic today for Xgeva injection. Patient's labs monitored, specifically, Calcium level, to ensure no increased risk of hypocalcemia with administration of the medication. Patient's calcium level is 8.9 mg/dL. Patient received therapy and will continue to be monitored prior to each dose.     Carl Sandoval, [address] 7/24/2018 10:50 AM

## 2018-07-26 ENCOUNTER — HOSPITAL ENCOUNTER (OUTPATIENT)
Dept: INFUSION THERAPY | Age: 68
Discharge: HOME | End: 2018-07-26
Payer: MEDICARE

## 2018-07-26 DIAGNOSIS — C78.7 RECTAL CANCER METASTASIZED TO LIVER (HCC): ICD-10-CM

## 2018-07-26 DIAGNOSIS — C20 RECTAL CANCER METASTASIZED TO LIVER (HCC): ICD-10-CM

## 2018-07-26 PROCEDURE — 96372 THER/PROPH/DIAG INJ SC/IM: CPT

## 2018-07-26 PROCEDURE — 6360000002 HC RX W HCPCS: Performed by: INTERNAL MEDICINE

## 2018-07-26 RX ADMIN — PEGFILGRASTIM 6 MG: 6 INJECTION SUBCUTANEOUS at 14:20

## 2018-08-14 ENCOUNTER — OFFICE VISIT (OUTPATIENT)
Dept: ONCOLOGY | Age: 68
End: 2018-08-14
Payer: MEDICARE

## 2018-08-14 ENCOUNTER — HOSPITAL ENCOUNTER (OUTPATIENT)
Dept: INFUSION THERAPY | Age: 68
Discharge: HOME | End: 2018-08-14
Payer: MEDICARE

## 2018-08-14 VITALS
DIASTOLIC BLOOD PRESSURE: 70 MMHG | HEIGHT: 73 IN | WEIGHT: 201 LBS | TEMPERATURE: 98 F | RESPIRATION RATE: 20 BRPM | BODY MASS INDEX: 26.64 KG/M2 | SYSTOLIC BLOOD PRESSURE: 124 MMHG | HEART RATE: 59 BPM

## 2018-08-14 VITALS — RESPIRATION RATE: 18 BRPM | SYSTOLIC BLOOD PRESSURE: 133 MMHG | DIASTOLIC BLOOD PRESSURE: 70 MMHG | HEART RATE: 65 BPM

## 2018-08-14 DIAGNOSIS — C20 RECTAL CANCER (HCC): Primary | ICD-10-CM

## 2018-08-14 DIAGNOSIS — C20 RECTAL CANCER METASTASIZED TO LIVER (HCC): ICD-10-CM

## 2018-08-14 DIAGNOSIS — C78.7 RECTAL CANCER METASTASIZED TO LIVER (HCC): ICD-10-CM

## 2018-08-14 LAB
ALBUMIN SERPL-MCNC: 3.3 G/DL (ref 3.5–5.2)
ALP BLD-CCNC: 195 U/L (ref 40–129)
ALT SERPL-CCNC: 24 U/L (ref 0–40)
ANION GAP SERPL CALCULATED.3IONS-SCNC: 8 MMOL/L (ref 7–16)
ANISOCYTOSIS: ABNORMAL
AST SERPL-CCNC: 36 U/L (ref 0–39)
BASOPHILS ABSOLUTE: 0.04 E9/L (ref 0–0.2)
BASOPHILS RELATIVE PERCENT: 0.7 % (ref 0–2)
BILIRUB SERPL-MCNC: 0.7 MG/DL (ref 0–1.2)
BUN BLDV-MCNC: 14 MG/DL (ref 8–23)
CALCIUM SERPL-MCNC: 9.1 MG/DL (ref 8.6–10.2)
CEA: 6.8 NG/ML (ref 0–5.2)
CHLORIDE BLD-SCNC: 108 MMOL/L (ref 98–107)
CO2: 24 MMOL/L (ref 22–29)
CREAT SERPL-MCNC: 0.8 MG/DL (ref 0.7–1.2)
EOSINOPHILS ABSOLUTE: 0.27 E9/L (ref 0.05–0.5)
EOSINOPHILS RELATIVE PERCENT: 4.9 % (ref 0–6)
GFR AFRICAN AMERICAN: >60
GFR NON-AFRICAN AMERICAN: >60 ML/MIN/1.73
GLUCOSE BLD-MCNC: 98 MG/DL (ref 74–109)
HCT VFR BLD CALC: 29 % (ref 37–54)
HEMOGLOBIN: 9.2 G/DL (ref 12.5–16.5)
IMMATURE GRANULOCYTES #: 0.02 E9/L
IMMATURE GRANULOCYTES %: 0.4 % (ref 0–5)
LYMPHOCYTES ABSOLUTE: 0.63 E9/L (ref 1.5–4)
LYMPHOCYTES RELATIVE PERCENT: 11.5 % (ref 20–42)
MCH RBC QN AUTO: 32.9 PG (ref 26–35)
MCHC RBC AUTO-ENTMCNC: 31.7 % (ref 32–34.5)
MCV RBC AUTO: 103.6 FL (ref 80–99.9)
MONOCYTES ABSOLUTE: 0.41 E9/L (ref 0.1–0.95)
MONOCYTES RELATIVE PERCENT: 7.5 % (ref 2–12)
NEUTROPHILS ABSOLUTE: 4.09 E9/L (ref 1.8–7.3)
NEUTROPHILS RELATIVE PERCENT: 75 % (ref 43–80)
OVALOCYTES: ABNORMAL
PDW BLD-RTO: 16.9 FL (ref 11.5–15)
PLATELET # BLD: 84 E9/L (ref 130–450)
PLATELET CONFIRMATION: NORMAL
PMV BLD AUTO: 11.9 FL (ref 7–12)
POIKILOCYTES: ABNORMAL
POTASSIUM SERPL-SCNC: 4.2 MMOL/L (ref 3.5–5)
RBC # BLD: 2.8 E12/L (ref 3.8–5.8)
SODIUM BLD-SCNC: 140 MMOL/L (ref 132–146)
TEAR DROP CELLS: ABNORMAL
TOTAL PROTEIN: 5.9 G/DL (ref 6.4–8.3)
WBC # BLD: 5.5 E9/L (ref 4.5–11.5)

## 2018-08-14 PROCEDURE — 6360000002 HC RX W HCPCS: Performed by: INTERNAL MEDICINE

## 2018-08-14 PROCEDURE — 85025 COMPLETE CBC W/AUTO DIFF WBC: CPT

## 2018-08-14 PROCEDURE — 96368 THER/DIAG CONCURRENT INF: CPT

## 2018-08-14 PROCEDURE — 82378 CARCINOEMBRYONIC ANTIGEN: CPT

## 2018-08-14 PROCEDURE — 2580000003 HC RX 258: Performed by: INTERNAL MEDICINE

## 2018-08-14 PROCEDURE — 99214 OFFICE O/P EST MOD 30 MIN: CPT | Performed by: INTERNAL MEDICINE

## 2018-08-14 PROCEDURE — 80053 COMPREHEN METABOLIC PANEL: CPT

## 2018-08-14 PROCEDURE — 96417 CHEMO IV INFUS EACH ADDL SEQ: CPT

## 2018-08-14 PROCEDURE — 96413 CHEMO IV INFUSION 1 HR: CPT

## 2018-08-14 PROCEDURE — 96411 CHEMO IV PUSH ADDL DRUG: CPT

## 2018-08-14 PROCEDURE — 96375 TX/PRO/DX INJ NEW DRUG ADDON: CPT

## 2018-08-14 PROCEDURE — 96415 CHEMO IV INFUSION ADDL HR: CPT

## 2018-08-14 RX ORDER — PALONOSETRON 0.05 MG/ML
0.25 INJECTION, SOLUTION INTRAVENOUS ONCE
Status: COMPLETED | OUTPATIENT
Start: 2018-08-14 | End: 2018-08-14

## 2018-08-14 RX ORDER — DEXAMETHASONE SODIUM PHOSPHATE 10 MG/ML
10 INJECTION, SOLUTION INTRAMUSCULAR; INTRAVENOUS ONCE
Status: COMPLETED | OUTPATIENT
Start: 2018-08-14 | End: 2018-08-14

## 2018-08-14 RX ORDER — SODIUM CHLORIDE 9 MG/ML
250 INJECTION, SOLUTION INTRAVENOUS CONTINUOUS
Status: CANCELLED | OUTPATIENT
Start: 2018-08-14 | End: 2018-09-01

## 2018-08-14 RX ORDER — DIPHENHYDRAMINE HYDROCHLORIDE 50 MG/ML
50 INJECTION INTRAMUSCULAR; INTRAVENOUS ONCE
Status: CANCELLED | OUTPATIENT
Start: 2018-08-14 | End: 2018-08-14

## 2018-08-14 RX ORDER — ATROPINE SULFATE 0.4 MG/ML
0.4 AMPUL (ML) INJECTION ONCE
Status: CANCELLED | OUTPATIENT
Start: 2018-08-14 | End: 2018-08-14

## 2018-08-14 RX ORDER — SODIUM CHLORIDE 9 MG/ML
INJECTION, SOLUTION INTRAVENOUS CONTINUOUS
Status: CANCELLED | OUTPATIENT
Start: 2018-08-14

## 2018-08-14 RX ORDER — SODIUM CHLORIDE 9 MG/ML
250 INJECTION, SOLUTION INTRAVENOUS CONTINUOUS
Status: DISCONTINUED | OUTPATIENT
Start: 2018-08-14 | End: 2018-08-15 | Stop reason: HOSPADM

## 2018-08-14 RX ORDER — FLUOROURACIL 50 MG/ML
850 INJECTION, SOLUTION INTRAVENOUS ONCE
Status: CANCELLED | OUTPATIENT
Start: 2018-08-14

## 2018-08-14 RX ORDER — 0.9 % SODIUM CHLORIDE 0.9 %
10 VIAL (ML) INJECTION ONCE
Status: CANCELLED | OUTPATIENT
Start: 2018-08-14 | End: 2018-08-14

## 2018-08-14 RX ORDER — PALONOSETRON 0.05 MG/ML
0.25 INJECTION, SOLUTION INTRAVENOUS ONCE
Status: CANCELLED | OUTPATIENT
Start: 2018-08-14

## 2018-08-14 RX ORDER — METHYLPREDNISOLONE SODIUM SUCCINATE 125 MG/2ML
125 INJECTION, POWDER, LYOPHILIZED, FOR SOLUTION INTRAMUSCULAR; INTRAVENOUS ONCE
Status: CANCELLED | OUTPATIENT
Start: 2018-08-14 | End: 2018-08-14

## 2018-08-14 RX ORDER — FLUOROURACIL 50 MG/ML
850 INJECTION, SOLUTION INTRAVENOUS ONCE
Status: COMPLETED | OUTPATIENT
Start: 2018-08-14 | End: 2018-08-14

## 2018-08-14 RX ORDER — SODIUM CHLORIDE 0.9 % (FLUSH) 0.9 %
10 SYRINGE (ML) INJECTION PRN
Status: CANCELLED | OUTPATIENT
Start: 2018-08-14

## 2018-08-14 RX ORDER — HEPARIN SODIUM (PORCINE) LOCK FLUSH IV SOLN 100 UNIT/ML 100 UNIT/ML
500 SOLUTION INTRAVENOUS PRN
Status: CANCELLED | OUTPATIENT
Start: 2018-08-14

## 2018-08-14 RX ADMIN — SODIUM CHLORIDE 400 MG: 9 INJECTION, SOLUTION INTRAVENOUS at 11:05

## 2018-08-14 RX ADMIN — SODIUM CHLORIDE 250 ML: 9 INJECTION, SOLUTION INTRAVENOUS at 10:10

## 2018-08-14 RX ADMIN — FLUOROURACIL 850 MG: 50 INJECTION, SOLUTION INTRAVENOUS at 13:13

## 2018-08-14 RX ADMIN — BEVACIZUMAB 400 MG: 400 INJECTION, SOLUTION INTRAVENOUS at 10:20

## 2018-08-14 RX ADMIN — DEXAMETHASONE SODIUM PHOSPHATE 10 MG: 10 INJECTION, SOLUTION INTRAMUSCULAR; INTRAVENOUS at 10:11

## 2018-08-14 RX ADMIN — LEUCOVORIN CALCIUM 850 MG: 350 INJECTION, POWDER, LYOPHILIZED, FOR SOLUTION INTRAMUSCULAR; INTRAVENOUS at 11:05

## 2018-08-14 RX ADMIN — PALONOSETRON HYDROCHLORIDE 0.25 MG: 0.25 INJECTION INTRAVENOUS at 10:11

## 2018-08-14 NOTE — PROGRESS NOTES
on 08/30/2016. Cycle # 4 FOLFIRI/Avastin was on 09/13/2016. CEA 23.4 on 09/13/2016. Cycle # 5 FOLFIRI/Avastin was on 09/27/2016. CEA 22.3 on 09/27/2016. Cycle # 6 FOLFIRI/Avastin was on 10/11/2016. CEA 18.4 on 10/11/2016.      Bone scan 10/21/2016 stable bone metastasis; ? New lesion anterior left sixth rib likely interval healing fracture. CT abdomen/pelvis revealed stable hepatic metastasis. CT chest negative for metastatic disease. Continue FOLFIRI/Avastin and repeat scans in 3 months. Cycle # 7 FOLFIRI/Avastin was on 10/25/2016. CEA 16.1  Cycle # 8 FOLFIRI/Avastin was on 11/08/2016. CEA 15.7  Cycle # 9 FOLFIRI/Avastin was on 11/29/2016. CEA 13.6 on 11/29/2016. Cycle # 10 FOLFIRI/Avastin was on 12/13/2016. CEA 10.6 on 12/13/2016. Cycle # 11 FOLFIRI/Avastin was on 12/27/2016. CEA 11.1 on 12/27/2016. Cycle # 12 FOLFIRI/Avastin was on 01/10/2017. CEA 9.7 on 01/10/2017.       CT chest 01/23/2017 No new pulmonary nodule or lymphadenopathy. Patchy groundglass opacities within the left lower lobe, suggestive of infectious or inflammatory etiology. Followup CT chest in 3 months. Slight increased linear sclerosis along the left anterior sixth rib corresponding to an area of increased uptake on the prior bone scan from 10/21/2016, favored to be related to interval healing changes of a nondisplaced fracture. CT abdomen/pelvis on 01/23/2017 Redemonstration of multiple hepatic metastases, which are similar compared to the prior CT from 10/21/2016. Redemonstration of area of increased sclerosis along the right acetabulum suspicious for metastatic disease. Bone scan on 01/23/2017 Stable subtle uptake within the left proximal diaphysis, again suggestive of metastatic disease  Decreased subtle uptake within the medial right acetabulum. Decreased uptake within the left anterior sixth rib corresponding to linear sclerosis on the recent CT, favored to be related to an joint rib fracture.     Continue FOLFIRI + Avastin 10/31/2017. Cycle # 34 FOLFIRI + Avastin was on 11/14/2017. CEA 7.2 on 11/14/2017. Cycle # 35 FOLFIRI + Avastin was on 11/28/2017. CEA 5.1 on 11/28/2017. Cycle # 36 FOLFIRI + Avastin was on 12/12/2017. CEA 6.1 on 12/12/2017. Bone scan 12/22/2017 noted no evidence of metastatic disease to the axial or appendicular skeleton. CT chest 12/22/2017 noted no evidence of metastatic disease. CT abdomen/pelvis 12/22/2017 noted stable hypodense lesions in the liver. Continue FOLFIRI + Avastin and repeat scans in 3 months. Cycle # 37 FOLFIRI + Avastin was on 12/27/2018. Cycle # 38 FOLFIRI + Avastin was on 01/09/2018. Cycle # 39 FOLFIRI + Avastin was on 01/23/2018. Cycle # 40 FOLFIRI + Avastin was on 02/06/2018. Cycle # 41 FOLFIRI + Avastin was on 02/20/2018. Cycle # 42 FOLFIRI + Avastin was on 03/06/2018. Cycle # 43 FOLFIRI + Avastin was on 03/20/2018. Bone scan on 03/26/2018 negative for metastatic disease. CT chest 03/26/2018 noted ? new 7 mm nodule in LUCY. CT abdomen/pelvis 03/26/2018 noted stable hypodense lesions in the liver. ? New small ascites in the abdomen. Patient wants to continue to monitor the lung finding and repeat scans in 2 months instead of changing the current regimen into oral Lonsurf. Cycle # 44 FOLFIRI + Avastin was on 04/03/2018. CEA 6.3 on 04/03/2018. Cycle # 45 FOLFIRI + Avastin was on 04/24/2018. CEA 5.3 on 04/24/2018. Cycle # 46 FOLFIRI + Avastin was on 05/08/2018. CEA 5.4 on 05/08/2018. Cycle # 47 FOLFIRI + Avastin was on 05/22/2018. CEA 6.1 on 05/22/2018. CT chest 05/31/2018 noted stable nodule in LUCY. No evidence of worsening malignancy. CT abdomen/pelvis on 05/31/2018 noted stable liver metastasis. No evidence of worsening malignancy. Continue FOLFIRI + Avastin and repeat scans in 3 months. Cycle # 48 FOLFIRI + Avastin was on 06/06/2018. Cycle # 49 FOLFIRI + Avastin was on 06/19/2018. Cycle # 50 FOLFIRI + Avastin was on 07/03/2018.   Cycle # 51 FOLFIRI + Avastin was on 07/24/2018. Cycle # 46 today 08/14/2018. Review of Systems;  CONSTITUTIONAL: No fever, chills. Fair appetite and fair energy level. ENMT: Eyes: no diplopia; Nose: No epistaxis. Mouth: No oral lesions. RESPIRATORY: No hemoptysis, shortness of breath, cough. CARDIOVASCULAR: No chest pain, palpitations. GASTROINTESTINAL: No nausea/vomiting, abdominal pain. GENITOURINARY: No dysuria, urinary frequency, hematuria. NEURO: No syncope, presyncope, headache. Remainder ROS: Negative. Past Medical History:   Past Medical History               Diagnosis Date    Arthritis      Cancer ([facility] [address]         colon    Depression      Hyperlipidemia      Hypertension           Medications:  Reviewed and reconciled.     Allergies: Allergies   Allergen Reactions    Neosporin [Neomycin-Polymyxin-Gramicidin] Rash    Tape [place] Tape] Rash      Physical Exam:  /70 (Site: Left Arm, Position: Sitting, Cuff Size: Medium Adult)   Pulse 59   Temp 98 °F (36.7 °C) (Temporal)   Resp 20   Ht 6' 1\" (1.854 m)   Wt 201 lb (91.2 kg)   BMI 26.52 kg/m²   GENERAL: Alert, oriented x 3, not in acute distress. HEENT: PERRLA, EOMI. No oral lesions. NECK: Supple. Without lymphadenopathy. LUNGS: Lungs are CTA bilaterally, with no wheezing, crackles or rhonchi. CARDIOVASCULAR: Regular rhythm. No murmurs, rubs or gallops. ABDOMEN: Soft. Non-tender, non-distended. Positive bowel sounds. EXTREMITIES: Without clubbing, cyanosis, or edema. NEUROLOGIC: No focal deficits. 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 KRAS Mutation: Mutation detected. BRAF Mutation: Mutation not detected. 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 CT scan abdomen/pelvis on 10/26/2015 revealed small nodules at the lung bases, extensive liver lesions consistent with metastatic rectosigmoid cancer. CEA 3488 on 10/30/2015.  Bone scan on 11/10/2015 FOLFOX + Avastin was on 04/26/2016. .5 on 04/26/2016. Cycle # 13 FOLFOX + Avastin was on 05/10/2016. .3 on 05/10/2016. Cycle # 14 FOLFOX+AVASTIN was on 05/24/2016. .5 on 05/24/2016. Cycle # 15 FOLFOX + Avastin was on 06/07/2016. CEA 90.5 on 06/07/2016. Ad[facility] to [facility]/13/2016-06/16/2016 for abdominal pain: EGD noted 1.5 cm clean based duodenal bulb ulceration s/p epinephrine and bicap per Dr. Marine Collet. No active bleeding. A. Stomach, biopsy: Mild chronic gastritis, immunostain negative for Helicobacter  B. Esophagus, biopsy: Gastric glandular mucosa with prominent ntestinal metaplasia ([place]'s epithelium), negative for epithelial dysplasia, esophageal squamous mucosa not identified     Cycle # 16 FOLFOX (discontinued avastin (bevacizumab) given association of peptic ulcer disease and known association of GI perforation.) was on 06/21/2016. CEA 47 on 06/21/2016. Cycle # 17 FOLFOX was on 07/05/2016. CEA 52.6 on 07/05/2016. CEA 47.6 on 07/19/2016.     Re-staging scans 07/19/2106: CT Chest negative for metastatic disease. CT Abdomen/Pelvis: Stable hepatic lesions; Question new mural thickening in the cecum. Bone Scan: New Let hip lesion suspicious for bone metastasis.     Increased CEA; new mural thickening in the cecum and new left hip lesion suspicious for bone metastasis are consistent with disease progression; He derived maximum benefit from FOLFOX/AVASTIN. D/C FOLFOX/AVASTIN. We recommended FOLFIRI second line therapy. Cycle # 1 FOLFIRI was on 08/02/2016. CEA 40.8 on 08/02/2016. Xgeva q4 weeks started on 08/02/2016. Cycle # 2 FOLFIRI was on 08/16/2016. CEA 31.7 on 08/16/2016. Cycle # 3 FOLFIRI (added Avastin) was on 08/30/2016 given that ulcers healed on EGD 08/15/2016 by Dr. Tez Timmons; Protonix bid since avastin re-started. Colonoscopy on 08/29/2016 (to look at the cecal area) unremarkable. CEA 26.7 on 08/30/2016. Cycle # 4 FOLFIRI/Avastin was on 09/13/2016.  CEA 23.4 on 44 FOLFIRI + Avastin was on 04/03/2018. CEA 6.3 on 04/03/2018. Cycle # 45 FOLFIRI + Avastin was on 04/24/2018. CEA 5.3 on 04/24/2018. Cycle # 46 FOLFIRI + Avastin was on 05/08/2018. CEA 5.4 on 05/08/2018. Cycle # 47 FOLFIRI + Avastin was on 05/22/2018. CEA 6.1 on 05/22/2018. CT chest 05/31/2018 noted stable nodule in LUCY. No evidence of worsening malignancy. CT abdomen/pelvis on 05/31/2018 noted stable liver metastasis. No evidence of worsening malignancy. Continue FOLFIRI + Avastin and repeat scans in 3 months. Cycle # 48 FOLFIRI + Avastin was on 06/06/2018. CEA 6.3 on 06/05/2018. Cycle # 49 FOLFIRI + Avastin was on 06/19/2018. CEA 6.1 on 06/19/2018. Cycle # 50 FOLFIRI + Avastin was on 07/03/2018. CEA 7.4 on 07/03/2018. Cycle # 51 FOLFIRI + Avastin was on 07/24/2018. CEA 6.7 on 07/24/2018. Cycle # 52 FOLFIRI + Avastin is today 08/14/2018. CEA pending. RTC 2 weeks for Cycle # 53 FOLFIRI + Avastin. Scans after next visit.   MSI testing noted no mismatch repair protein loss of expression    8/14/2018  Stephanie Velazquez MD  Board Certified Medical Oncologist

## 2018-08-14 NOTE — PROGRESS NOTES
Pt presents to clinic for chemotherapy. Pt tolerated treatment well without complications. Pt discharged with port accesses ambulatory in stable condition.

## 2018-08-14 NOTE — PROGRESS NOTES
Ok to proceed with chemo with platelets of 84 per Dr. Felicitas Gray. Jada mckeon RN and this RN to witness verbal ok .

## 2018-08-16 ENCOUNTER — HOSPITAL ENCOUNTER (OUTPATIENT)
Dept: INFUSION THERAPY | Age: 68
Discharge: HOME | End: 2018-08-16
Payer: MEDICARE

## 2018-08-16 DIAGNOSIS — C20 RECTAL CANCER METASTASIZED TO LIVER (HCC): Primary | ICD-10-CM

## 2018-08-16 DIAGNOSIS — C20 RECTAL CANCER METASTASIZED TO LIVER (HCC): ICD-10-CM

## 2018-08-16 DIAGNOSIS — C78.7 RECTAL CANCER METASTASIZED TO LIVER (HCC): Primary | ICD-10-CM

## 2018-08-16 DIAGNOSIS — C78.7 RECTAL CANCER METASTASIZED TO LIVER (HCC): ICD-10-CM

## 2018-08-16 DIAGNOSIS — C79.51 BONE METASTASIS (HCC): ICD-10-CM

## 2018-08-16 PROCEDURE — 96372 THER/PROPH/DIAG INJ SC/IM: CPT

## 2018-08-16 PROCEDURE — 6360000002 HC RX W HCPCS: Performed by: INTERNAL MEDICINE

## 2018-08-16 RX ADMIN — PEGFILGRASTIM 6 MG: 6 INJECTION SUBCUTANEOUS at 14:19

## 2018-08-27 ENCOUNTER — HOSPITAL ENCOUNTER (OUTPATIENT)
Age: 68
Discharge: HOME | End: 2018-08-27
Payer: MEDICARE

## 2018-08-27 DIAGNOSIS — C79.51 BONE METASTASIS (HCC): ICD-10-CM

## 2018-08-27 DIAGNOSIS — C78.7 RECTAL CANCER METASTASIZED TO LIVER (HCC): ICD-10-CM

## 2018-08-27 DIAGNOSIS — C20 RECTAL CANCER METASTASIZED TO LIVER (HCC): ICD-10-CM

## 2018-08-27 LAB
ALBUMIN SERPL-MCNC: 3.7 G/DL (ref 3.5–5.2)
ALP BLD-CCNC: 183 U/L (ref 40–129)
ALT SERPL-CCNC: 22 U/L (ref 0–40)
ANION GAP SERPL CALCULATED.3IONS-SCNC: 9 MMOL/L (ref 7–16)
ANISOCYTOSIS: ABNORMAL
AST SERPL-CCNC: 28 U/L (ref 0–39)
BASOPHILS ABSOLUTE: 0 E9/L (ref 0–0.2)
BASOPHILS RELATIVE PERCENT: 0.6 % (ref 0–2)
BILIRUB SERPL-MCNC: 0.7 MG/DL (ref 0–1.2)
BUN BLDV-MCNC: 18 MG/DL (ref 8–23)
CALCIUM SERPL-MCNC: 9.1 MG/DL (ref 8.6–10.2)
CEA: 6.5 NG/ML (ref 0–5.2)
CHLORIDE BLD-SCNC: 106 MMOL/L (ref 98–107)
CO2: 27 MMOL/L (ref 22–29)
CREAT SERPL-MCNC: 0.9 MG/DL (ref 0.7–1.2)
EOSINOPHILS ABSOLUTE: 0.12 E9/L (ref 0.05–0.5)
EOSINOPHILS RELATIVE PERCENT: 3.5 % (ref 0–6)
GFR AFRICAN AMERICAN: >60
GFR NON-AFRICAN AMERICAN: >60 ML/MIN/1.73
GLUCOSE BLD-MCNC: 128 MG/DL (ref 74–109)
HCT VFR BLD CALC: 30.3 % (ref 37–54)
HEMOGLOBIN: 9.6 G/DL (ref 12.5–16.5)
LYMPHOCYTES ABSOLUTE: 0.36 E9/L (ref 1.5–4)
LYMPHOCYTES RELATIVE PERCENT: 10.5 % (ref 20–42)
MCH RBC QN AUTO: 32.9 PG (ref 26–35)
MCHC RBC AUTO-ENTMCNC: 31.7 % (ref 32–34.5)
MCV RBC AUTO: 103.8 FL (ref 80–99.9)
MONOCYTES ABSOLUTE: 0.03 E9/L (ref 0.1–0.95)
MONOCYTES RELATIVE PERCENT: 0.9 % (ref 2–12)
NEUTROPHILS ABSOLUTE: 2.81 E9/L (ref 1.8–7.3)
NEUTROPHILS RELATIVE PERCENT: 85.1 % (ref 43–80)
OVALOCYTES: ABNORMAL
PDW BLD-RTO: 16.7 FL (ref 11.5–15)
PLATELET # BLD: 93 E9/L (ref 130–450)
PLATELET CONFIRMATION: NORMAL
PMV BLD AUTO: 10.5 FL (ref 7–12)
POIKILOCYTES: ABNORMAL
POLYCHROMASIA: ABNORMAL
POTASSIUM SERPL-SCNC: 3.8 MMOL/L (ref 3.5–5)
RBC # BLD: 2.92 E12/L (ref 3.8–5.8)
SODIUM BLD-SCNC: 142 MMOL/L (ref 132–146)
SPHEROCYTES: ABNORMAL
TEAR DROP CELLS: ABNORMAL
TOTAL PROTEIN: 6.5 G/DL (ref 6.4–8.3)
WBC # BLD: 3.3 E9/L (ref 4.5–11.5)

## 2018-08-27 PROCEDURE — 82378 CARCINOEMBRYONIC ANTIGEN: CPT

## 2018-08-27 PROCEDURE — 36415 COLL VENOUS BLD VENIPUNCTURE: CPT

## 2018-08-27 PROCEDURE — 80053 COMPREHEN METABOLIC PANEL: CPT

## 2018-08-27 PROCEDURE — 85025 COMPLETE CBC W/AUTO DIFF WBC: CPT

## 2018-08-28 ENCOUNTER — OFFICE VISIT (OUTPATIENT)
Dept: ONCOLOGY | Age: 68
End: 2018-08-28
Payer: MEDICARE

## 2018-08-28 ENCOUNTER — TELEPHONE (OUTPATIENT)
Dept: INFUSION THERAPY | Age: 68
End: 2018-08-28

## 2018-08-28 ENCOUNTER — HOSPITAL ENCOUNTER (OUTPATIENT)
Dept: INFUSION THERAPY | Age: 68
Discharge: HOME | End: 2018-08-28
Payer: MEDICARE

## 2018-08-28 VITALS
RESPIRATION RATE: 20 BRPM | WEIGHT: 192.9 LBS | BODY MASS INDEX: 25.57 KG/M2 | DIASTOLIC BLOOD PRESSURE: 65 MMHG | SYSTOLIC BLOOD PRESSURE: 123 MMHG | HEIGHT: 73 IN | HEART RATE: 62 BPM | TEMPERATURE: 98 F

## 2018-08-28 VITALS — DIASTOLIC BLOOD PRESSURE: 70 MMHG | SYSTOLIC BLOOD PRESSURE: 128 MMHG | HEART RATE: 55 BPM | RESPIRATION RATE: 20 BRPM

## 2018-08-28 DIAGNOSIS — C20 RECTAL CANCER METASTASIZED TO LIVER (HCC): ICD-10-CM

## 2018-08-28 DIAGNOSIS — C78.7 RECTAL CANCER METASTASIZED TO LIVER (HCC): Primary | ICD-10-CM

## 2018-08-28 DIAGNOSIS — C78.7 RECTAL CANCER METASTASIZED TO LIVER (HCC): ICD-10-CM

## 2018-08-28 DIAGNOSIS — C20 RECTAL CANCER METASTASIZED TO LIVER (HCC): Primary | ICD-10-CM

## 2018-08-28 DIAGNOSIS — C79.51 BONE METASTASIS (HCC): ICD-10-CM

## 2018-08-28 PROCEDURE — 96368 THER/DIAG CONCURRENT INF: CPT

## 2018-08-28 PROCEDURE — 96415 CHEMO IV INFUSION ADDL HR: CPT

## 2018-08-28 PROCEDURE — 96417 CHEMO IV INFUS EACH ADDL SEQ: CPT

## 2018-08-28 PROCEDURE — 96375 TX/PRO/DX INJ NEW DRUG ADDON: CPT

## 2018-08-28 PROCEDURE — 99214 OFFICE O/P EST MOD 30 MIN: CPT | Performed by: INTERNAL MEDICINE

## 2018-08-28 PROCEDURE — 2580000003 HC RX 258: Performed by: INTERNAL MEDICINE

## 2018-08-28 PROCEDURE — 96411 CHEMO IV PUSH ADDL DRUG: CPT

## 2018-08-28 PROCEDURE — 96413 CHEMO IV INFUSION 1 HR: CPT

## 2018-08-28 PROCEDURE — 6360000002 HC RX W HCPCS: Performed by: INTERNAL MEDICINE

## 2018-08-28 PROCEDURE — 96372 THER/PROPH/DIAG INJ SC/IM: CPT

## 2018-08-28 RX ORDER — HEPARIN SODIUM (PORCINE) LOCK FLUSH IV SOLN 100 UNIT/ML 100 UNIT/ML
500 SOLUTION INTRAVENOUS PRN
Status: DISCONTINUED | OUTPATIENT
Start: 2018-08-28 | End: 2018-08-29 | Stop reason: HOSPADM

## 2018-08-28 RX ORDER — 0.9 % SODIUM CHLORIDE 0.9 %
10 VIAL (ML) INJECTION ONCE
Status: CANCELLED | OUTPATIENT
Start: 2018-08-28 | End: 2018-08-28

## 2018-08-28 RX ORDER — SODIUM CHLORIDE 9 MG/ML
250 INJECTION, SOLUTION INTRAVENOUS CONTINUOUS
Status: DISCONTINUED | OUTPATIENT
Start: 2018-08-28 | End: 2018-08-29 | Stop reason: HOSPADM

## 2018-08-28 RX ORDER — SODIUM CHLORIDE 0.9 % (FLUSH) 0.9 %
10 SYRINGE (ML) INJECTION PRN
Status: CANCELLED | OUTPATIENT
Start: 2018-08-28

## 2018-08-28 RX ORDER — METHYLPREDNISOLONE SODIUM SUCCINATE 125 MG/2ML
125 INJECTION, POWDER, LYOPHILIZED, FOR SOLUTION INTRAMUSCULAR; INTRAVENOUS ONCE
Status: CANCELLED | OUTPATIENT
Start: 2018-08-28 | End: 2018-08-28

## 2018-08-28 RX ORDER — SODIUM CHLORIDE 9 MG/ML
INJECTION, SOLUTION INTRAVENOUS CONTINUOUS
Status: CANCELLED | OUTPATIENT
Start: 2018-08-28

## 2018-08-28 RX ORDER — FLUOROURACIL 50 MG/ML
850 INJECTION, SOLUTION INTRAVENOUS ONCE
Status: CANCELLED | OUTPATIENT
Start: 2018-08-28

## 2018-08-28 RX ORDER — ATROPINE SULFATE 0.4 MG/ML
0.4 AMPUL (ML) INJECTION ONCE
Status: CANCELLED | OUTPATIENT
Start: 2018-08-28 | End: 2018-08-28

## 2018-08-28 RX ORDER — DIPHENHYDRAMINE HYDROCHLORIDE 50 MG/ML
50 INJECTION INTRAMUSCULAR; INTRAVENOUS ONCE
Status: CANCELLED | OUTPATIENT
Start: 2018-08-28 | End: 2018-08-28

## 2018-08-28 RX ORDER — FLUOROURACIL 50 MG/ML
850 INJECTION, SOLUTION INTRAVENOUS ONCE
Status: COMPLETED | OUTPATIENT
Start: 2018-08-28 | End: 2018-08-28

## 2018-08-28 RX ORDER — DEXAMETHASONE SODIUM PHOSPHATE 10 MG/ML
10 INJECTION, SOLUTION INTRAMUSCULAR; INTRAVENOUS ONCE
Status: COMPLETED | OUTPATIENT
Start: 2018-08-28 | End: 2018-08-28

## 2018-08-28 RX ORDER — SODIUM CHLORIDE 0.9 % (FLUSH) 0.9 %
10 SYRINGE (ML) INJECTION PRN
Status: DISCONTINUED | OUTPATIENT
Start: 2018-08-28 | End: 2018-08-29 | Stop reason: HOSPADM

## 2018-08-28 RX ORDER — SODIUM CHLORIDE 9 MG/ML
250 INJECTION, SOLUTION INTRAVENOUS CONTINUOUS
Status: CANCELLED | OUTPATIENT
Start: 2018-08-28 | End: 2018-09-14

## 2018-08-28 RX ORDER — PALONOSETRON 0.05 MG/ML
0.25 INJECTION, SOLUTION INTRAVENOUS ONCE
Status: COMPLETED | OUTPATIENT
Start: 2018-08-28 | End: 2018-08-28

## 2018-08-28 RX ORDER — HEPARIN SODIUM (PORCINE) LOCK FLUSH IV SOLN 100 UNIT/ML 100 UNIT/ML
500 SOLUTION INTRAVENOUS PRN
Status: CANCELLED | OUTPATIENT
Start: 2018-08-28

## 2018-08-28 RX ORDER — PALONOSETRON 0.05 MG/ML
0.25 INJECTION, SOLUTION INTRAVENOUS ONCE
Status: CANCELLED | OUTPATIENT
Start: 2018-08-28

## 2018-08-28 RX ADMIN — FLUOROURACIL 850 MG: 50 INJECTION, SOLUTION INTRAVENOUS at 11:51

## 2018-08-28 RX ADMIN — BEVACIZUMAB 400 MG: 400 INJECTION, SOLUTION INTRAVENOUS at 09:22

## 2018-08-28 RX ADMIN — Medication 500 UNITS: at 11:55

## 2018-08-28 RX ADMIN — DENOSUMAB 120 MG: 120 INJECTION SUBCUTANEOUS at 11:54

## 2018-08-28 RX ADMIN — LEUCOVORIN CALCIUM 850 MG: 350 INJECTION, POWDER, LYOPHILIZED, FOR SOLUTION INTRAMUSCULAR; INTRAVENOUS at 10:12

## 2018-08-28 RX ADMIN — DEXAMETHASONE SODIUM PHOSPHATE 10 MG: 10 INJECTION, SOLUTION INTRAMUSCULAR; INTRAVENOUS at 09:56

## 2018-08-28 RX ADMIN — SODIUM CHLORIDE 400 MG: 9 INJECTION, SOLUTION INTRAVENOUS at 10:12

## 2018-08-28 RX ADMIN — SODIUM CHLORIDE 250 ML: 9 INJECTION, SOLUTION INTRAVENOUS at 09:21

## 2018-08-28 RX ADMIN — Medication 10 ML: at 11:56

## 2018-08-28 RX ADMIN — PALONOSETRON HYDROCHLORIDE 0.25 MG: 0.25 INJECTION INTRAVENOUS at 09:56

## 2018-08-28 NOTE — PROGRESS NOTES
[place] [id number]  Attending Clinic Note     Reason for Visit: Follow-up on a patient with Metastatic Rectal Cancer.     PCP: Lashell Boyd MD     History of Present Illness:  71 y/o  male who was referred to see Dr. Kerri Munoz (GI team) for evaluation of bright red blood per rectum, mild anemia and change in bowel habits with diarrhea. CEA 2640 on 10/19/2015. AlcP 299 AST 57 ALT 75 on 10/19/2015. Colonoscopy in 2013 noted no significant polyps, colitis or lesions at that time. Denies any Family History of colorectal cancer or polyps.     Colonoscopy on 10/19/2015 revealed:  1. Ascending polyp, 8 mm, hot snare: Tubulovillous adenoma. 2. Transverse polyp, 1 cm, hot snare: Serrated polyp most consistent with sessile serrated adenoma. 3. Five splenic flexure polyps, three - 5 mm, 7 mm, 8 mm, hot snare and biopsy: Four segments of Tubular Adenoma  4. Descending polyp, 5 mm, biopsy: Tubular adenoma. 5. Sigmoid polyp, 4 mm biopsy, Serrated polyp most consistent with sessile serrated adenoma. 6. Two rectal polyps, 4 mm biopsy: Serrated polyp most consistent with hyperplastic polyp. 7. Rectosigmoid colon mass (large mass approximately 65% circumference of the lumen; Very friable, firm and hard): Tubulovillous adenoma with associated focal erosion and fibroplasia.      CT scan abdomen/pelvis on 10/26/2015:  1. Small nodules at lung bases likely represent metastatic colon   cancer. 2. Extensive likely metastatic colon cancer throughout the liver.      3. Mild mural thickening and luminal narrowing in the   terminal ileum, otherwise nonspecific.      Colonoscopy with snare removal rectal mass was performed by Dr. Carolyn Prince. Pathology proved:  Rectal polyp: Invasive adenocarcinoma involving villous adenoma and extending to the cauterized edge of excision. KRAS Mutation: Mutation detected. BRAF Mutation: Mutation not detected.   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     CEA cancer. CEA 3488 on 10/30/2015. Bone scan on 11/10/2015 noted no metastatic disease. CT chest on 11/10/2015 revealed Multiple pulmonary nodules in the upper and lower lobes consistent with metastatic disease; Hepatic metastasis also visualized. 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 Mediport was placed. Cycle # 1 of FOLFOX + Avastin was on 11/23/2015. CEA was 4555 on 11/23/2015. Cycle # 2 of FOLFOX + Avastin was on 12/08/2015. CEA was 3160 on 12/08/2015. Cycle # 3 of FOLFOX + Avastin was on 12/22/2015. CEA was 2516 on 12/21/2015. Cycle # 4 of FOLFOX + Avastin was on 01/05/2016. CEA was 2098 on 01/05/2015. Cycle # 5 of FOLFOX + Avastin was on 01/19/2016. CEA was 1511 on 01/05/2015.     -Bone scan on 01/26/2016 noted no metastatic disease. CT chest on 01/26/2016 revealed Significant response to treatment with no visible residual nodules. CT scan abdomen/pelvis on 01/26/2016 noted Interval decreased size of multiple masses in the liver compatible with treatment response. Continue another 2 months of FOLFOX + Avastin and repeat scans. Cycle # 6 of FOLFOX + Avastin was on 02/02/2016.  on 02/02/2016. Cycle # 7 of FOLFOX + Avastin was on 02/16/2016.  on 02/16/2016. Cycle # 8 of FOLFOX + Avastin was on 03/01/2016.  on 03/01/2016. Cycle # 9 of FOLFOX + Avastin was on 03/15/2016.  on 03/15/2016. Cycle # 10 of FOLFOX + Avastin was on 03/29/2016. .4 on 03/29/2016. Cycle # 11 of FOLFOX + Avastin was on 04/12/2016. .4 on 04/12/2016.     Re-staging scans on 04/19/2016: CT Chest: clear lungs; no evidence of recurring pulmonary nodule; CT Abdomen/Pelvis: Further interval decrease in size of the multiple metastatic hepatic lesions, the largest lesion now measures 3.9 x 3.5 cm and previously measured 4.5 cm in maximum diameter.  No mesenteric lymphadenopathy is identified; Bone Scan: No evidence of osseous on 08/30/2016. Cycle # 4 FOLFIRI/Avastin was on 09/13/2016. CEA 23.4 on 09/13/2016. Cycle # 5 FOLFIRI/Avastin was on 09/27/2016. CEA 22.3 on 09/27/2016. Cycle # 6 FOLFIRI/Avastin was on 10/11/2016. CEA 18.4 on 10/11/2016.      Bone scan 10/21/2016 stable bone metastasis; ? New lesion anterior left sixth rib likely interval healing fracture. CT abdomen/pelvis revealed stable hepatic metastasis. CT chest negative for metastatic disease. Continue FOLFIRI/Avastin and repeat scans in 3 months. Cycle # 7 FOLFIRI/Avastin was on 10/25/2016. CEA 16.1 on 10/25/2016. Cycle # 8 FOLFIRI/Avastin was on 11/08/2016. CEA 15.7 on 11/08/2016. Cycle # 9 FOLFIRI/Avastin was on 11/29/2016. CEA 13.6 on 11/29/2016. Cycle # 10 FOLFIRI/Avastin was on 12/13/2016. CEA 10.6 on 12/13/2016. Cycle # 11 FOLFIRI/Avastin was on 12/27/2016. CEA 11.1 on 12/27/2016. Cycle # 12 FOLFIRI/Avastin was on 01/10/2017. CEA 9.7 on 01/10/2017. CT chest 01/23/2017 No new pulmonary nodule or lymphadenopathy. Patchy groundglass opacities within the left lower lobe, suggestive of infectious or inflammatory etiology. Followup CT chest in 3 months. Slight increased linear sclerosis along the left anterior sixth rib corresponding to an area of increased uptake on the prior bone scan from 10/21/2016, favored to be related to interval healing changes of a nondisplaced fracture. CT abdomen/pelvis on 01/23/2017 Redemonstration of multiple hepatic metastases, which are similar compared to the prior CT from 10/21/2016. Redemonstration of area of increased sclerosis along the right acetabulum suspicious for metastatic disease. Bone scan on 01/23/2017 Stable subtle uptake within the left proximal diaphysis, again suggestive of metastatic disease  Decreased subtle uptake within the medial right acetabulum.    Decreased uptake within the left anterior sixth rib corresponding to linear sclerosis on the recent CT, favored to be related to an joint rib fracture. Continue FOLFIRI + Avastin and repeat scans in 3 months. Cycle # 13 FOLFIRI + Avastin was on 01/24/2017. Cycle # 14 FOLFIRI + Avastin was on 02/07/2017. Cycle # 15 FOLFIRI + Avastin was on 02/21/2017. Cycle # 16 FOLFIRI + Avastin was on 03/07/2017. Cycle # 17 FOLFIRI + Avastin was on 03/21/2017. Cycle # 18 FOLFIRI + Avastin was on 04/04/2017. CT chest 04/17/2017 negative for metastatic disease. CT abdomen/pelvis 04/17/2017 Stable hypodense metastatic lesions within the liver. Stable area of increased sclerosis along the right acetabulum  Bone scan 04/17/2017 Stable subtle uptake within the left proximal femoral diaphysis; No definite abnormal uptake in the region of a sclerotic lesion along the posterior column of the right acetabulum noted on CT. Stable slight uptake within the left anterior sixth rib corresponding to linear sclerosis on the recent CT, favored to be related to a fracture. Continue FOLFIRI + Avastin and repeat scans in 3 months. Cycle # 19 FOLFIRI + Avastin was on 04/18/2017. CEA 7.5 on 04/18/2017. Cycle # 20 FOLFIRI + Avastin was on 05/02/2017. CEA 7.7 on 05/02/2017. Cycle # 21 FOLFIRI + Avastin was on 05/16/2017. CEA 7.1 on 05/16/2017. Cycle # 22 FOLFIRI + Avastin was on 05/30/2017. CEA 8.2 on 05/30/2017. Cycle # 23 FOLFIRI + Avastin was on 06/13/2017. CEA 7.7 on 06/13/2017. Cycle # 24 FOLFIRI + Avastin was on 06/27/2017. CEA 6.6 on 06/27/2017. Re-staging scans 07/05/2017:  Bone scan stable proximal left femoral diaphysis, right acetabulum, and left anterior sixth rib lesions;  CT abdomen/pelvis stable hypodense metastatic lesions within the liver; CT chest without convincing evidence of metastatic disease. Cycle # 25 FOLFIRI + Avastin was on 07/11/2017. CEA 6.3 on 07/11/2017. Cycle # 26 FOLFIRI + Avastin was on 07/25/2017. CEA 7.3 on 07/25/2017. Cycle # 27 FOLFIRI + Avastin was on 08/08/2017. CEA 6.5 on 08/08/2017.   Cycle # 28 FOLFIRI + Avastin was on 08/22/2017. CEA 5.9 on 08/22/2017. Cycle # 29 FOLFIRI + Avastin was on 09/05/2017. CEA 6.3 on 09/05/2017. Cycle # 30 FOLFIRI + Avastin was on 09/19/2017. CEA 6.3 on 09/19/2017. Bone scan 09/26/2017 stable. CT abdomen/pelvis on 09/26/2017 Stable hypodense mass in the liver. Stable sclerotic lesion in the acetabulum. CT chest 09/26/2017 negative for metastatic disease. Cycle # 31 FOLFIRI + Avastin was on 10/03/2017. CEA 7.4 on 10/03/2017. Cycle # 32 FOLFIRI + Avastin was on 10/17/2017. CEA 6.0 on 10/17/2017. Cycle # 33 FOLFIRI + Avastin was on 10/31/2017. CEA 6.8 on 10/31/2017. Cycle # 34 FOLFIRI + Avastin was on 11/14/2017. CEA 7.2 on 11/14/2017. Cycle # 35 FOLFIRI + Avastin was on 11/28/2017. CEA 5.1 on 11/28/2017. Cycle # 36 FOLFIRI + Avastin was on 12/12/2017. CEA 6.1 on 12/12/2017. Bone scan 12/22/2017 noted no evidence of metastatic disease to the axial or appendicular skeleton. CT chest 12/22/2017 noted no evidence of metastatic disease. CT abdomen/pelvis 12/22/2017 noted stable hypodense lesions in the liver. Continue FOLFIRI + Avastin and repeat scans in 3 months. Cycle # 37 FOLFIRI + Avastin was on 12/27/2018. CEA 6.7 on 12/27/2017. Cycle # 38 FOLFIRI + Avastin was on 01/09/2018. CEA 5.0 on 01/09/2018. Cycle # 39 FOLFIRI + Avastin was on 01/23/2018. CEA 6.5 on 01/23/2018. Cycle # 40 FOLFIRI + Avastin was on 02/06/2018. CEA 6.9 on 02/06/2018. Cycle # 41 FOLFIRI + Avastin was on 02/20/2018. CEA 6.4 on 02/20/2018. Cycle # 42 FOLFIRI + Avastin was on 03/06/2018. CEA 6.6 on 03/06/2018. Cycle # 43 FOLFIRI + Avastin was on 03/20/2018. CEA 5.7 on 03/20/2018. Bone scan on 03/26/2018 negative for metastatic disease. CT chest 03/26/2018 noted ? new 7 mm nodule in LUCY. CT abdomen/pelvis 03/26/2018 noted stable hypodense lesions in the liver. ? New small ascites in the abdomen.   Patient wants to continue to monitor the lung finding and repeat scans in 2 months instead of changing the

## 2018-08-28 NOTE — PROGRESS NOTES
Patient presents to clinic today for Xgeva injection. Patient's labs monitored, specifically, Calcium level, to ensure no increased risk of hypocalcemia with administration of the medication. Patient's calcium level is 9.1 mg/dL, drawn 8/27/18. Patient received therapy and will continue to be monitored prior to each dose.     Shelley Ibarra, [place] 8/28/2018 11:36 AM

## 2018-08-30 ENCOUNTER — HOSPITAL ENCOUNTER (OUTPATIENT)
Dept: INFUSION THERAPY | Age: 68
Discharge: HOME | End: 2018-08-30
Payer: MEDICARE

## 2018-08-30 DIAGNOSIS — C20 RECTAL CANCER METASTASIZED TO LIVER (HCC): ICD-10-CM

## 2018-08-30 DIAGNOSIS — C78.7 RECTAL CANCER METASTASIZED TO LIVER (HCC): ICD-10-CM

## 2018-08-30 PROCEDURE — 6360000002 HC RX W HCPCS: Performed by: INTERNAL MEDICINE

## 2018-08-30 PROCEDURE — 96372 THER/PROPH/DIAG INJ SC/IM: CPT

## 2018-08-30 RX ADMIN — PEGFILGRASTIM 6 MG: 6 INJECTION SUBCUTANEOUS at 14:57

## 2018-09-06 ENCOUNTER — HOSPITAL ENCOUNTER (OUTPATIENT)
Dept: CT IMAGING | Age: 68
Discharge: HOME | End: 2018-09-06
Payer: MEDICARE

## 2018-09-06 DIAGNOSIS — C78.7 RECTAL CANCER METASTASIZED TO LIVER (HCC): ICD-10-CM

## 2018-09-06 DIAGNOSIS — C20 RECTAL CANCER METASTASIZED TO LIVER (HCC): ICD-10-CM

## 2018-09-06 PROCEDURE — 71260 CT THORAX DX C+: CPT

## 2018-09-06 PROCEDURE — 6360000004 HC RX CONTRAST MEDICATION: Performed by: RADIOLOGY

## 2018-09-06 PROCEDURE — 74177 CT ABD & PELVIS W/CONTRAST: CPT

## 2018-09-06 RX ADMIN — IOHEXOL 50 ML: 240 INJECTION, SOLUTION INTRATHECAL; INTRAVASCULAR; INTRAVENOUS; ORAL at 11:20

## 2018-09-06 RX ADMIN — IOPAMIDOL 80 ML: 755 INJECTION, SOLUTION INTRAVENOUS at 11:20

## 2018-09-10 ENCOUNTER — HOSPITAL ENCOUNTER (OUTPATIENT)
Age: 68
Discharge: HOME | End: 2018-09-10
Payer: MEDICARE

## 2018-09-10 DIAGNOSIS — C20 RECTAL CANCER (HCC): ICD-10-CM

## 2018-09-10 LAB
ALBUMIN SERPL-MCNC: 3.7 G/DL (ref 3.5–5.2)
ALP BLD-CCNC: 152 U/L (ref 40–129)
ALT SERPL-CCNC: 25 U/L (ref 0–40)
ANION GAP SERPL CALCULATED.3IONS-SCNC: 10 MMOL/L (ref 7–16)
ANISOCYTOSIS: ABNORMAL
AST SERPL-CCNC: 30 U/L (ref 0–39)
BASOPHILS ABSOLUTE: 0.09 E9/L (ref 0–0.2)
BASOPHILS RELATIVE PERCENT: 3.5 % (ref 0–2)
BILIRUB SERPL-MCNC: 0.6 MG/DL (ref 0–1.2)
BUN BLDV-MCNC: 11 MG/DL (ref 8–23)
CALCIUM SERPL-MCNC: 9.2 MG/DL (ref 8.6–10.2)
CEA: 6.4 NG/ML (ref 0–5.2)
CHLORIDE BLD-SCNC: 104 MMOL/L (ref 98–107)
CO2: 28 MMOL/L (ref 22–29)
CREAT SERPL-MCNC: 1 MG/DL (ref 0.7–1.2)
EOSINOPHILS ABSOLUTE: 0.09 E9/L (ref 0.05–0.5)
EOSINOPHILS RELATIVE PERCENT: 3.5 % (ref 0–6)
GFR AFRICAN AMERICAN: >60
GFR NON-AFRICAN AMERICAN: >60 ML/MIN/1.73
GLUCOSE BLD-MCNC: 118 MG/DL (ref 74–109)
HCT VFR BLD CALC: 29.7 % (ref 37–54)
HEMOGLOBIN: 9.5 G/DL (ref 12.5–16.5)
LYMPHOCYTES ABSOLUTE: 0.43 E9/L (ref 1.5–4)
LYMPHOCYTES RELATIVE PERCENT: 15.8 % (ref 20–42)
MCH RBC QN AUTO: 33 PG (ref 26–35)
MCHC RBC AUTO-ENTMCNC: 32 % (ref 32–34.5)
MCV RBC AUTO: 103.1 FL (ref 80–99.9)
METAMYELOCYTES RELATIVE PERCENT: 0.9 % (ref 0–1)
MONOCYTES ABSOLUTE: 0.14 E9/L (ref 0.1–0.95)
MONOCYTES RELATIVE PERCENT: 5.3 % (ref 2–12)
NEUTROPHILS ABSOLUTE: 1.94 E9/L (ref 1.8–7.3)
NEUTROPHILS RELATIVE PERCENT: 71.1 % (ref 43–80)
OVALOCYTES: ABNORMAL
PDW BLD-RTO: 15.9 FL (ref 11.5–15)
PLATELET # BLD: 103 E9/L (ref 130–450)
PMV BLD AUTO: 10.7 FL (ref 7–12)
POIKILOCYTES: ABNORMAL
POLYCHROMASIA: ABNORMAL
POTASSIUM SERPL-SCNC: 4 MMOL/L (ref 3.5–5)
RBC # BLD: 2.88 E12/L (ref 3.8–5.8)
SODIUM BLD-SCNC: 142 MMOL/L (ref 132–146)
TEAR DROP CELLS: ABNORMAL
TOTAL PROTEIN: 6.7 G/DL (ref 6.4–8.3)
TOXIC GRANULATION: ABNORMAL
WBC # BLD: 2.7 E9/L (ref 4.5–11.5)

## 2018-09-10 PROCEDURE — 80053 COMPREHEN METABOLIC PANEL: CPT

## 2018-09-10 PROCEDURE — 36415 COLL VENOUS BLD VENIPUNCTURE: CPT

## 2018-09-10 PROCEDURE — 82378 CARCINOEMBRYONIC ANTIGEN: CPT

## 2018-09-10 PROCEDURE — 85025 COMPLETE CBC W/AUTO DIFF WBC: CPT

## 2018-09-11 ENCOUNTER — OFFICE VISIT (OUTPATIENT)
Dept: ONCOLOGY | Age: 68
End: 2018-09-11
Payer: MEDICARE

## 2018-09-11 ENCOUNTER — HOSPITAL ENCOUNTER (OUTPATIENT)
Dept: INFUSION THERAPY | Age: 68
Discharge: HOME | End: 2018-09-11
Payer: MEDICARE

## 2018-09-11 VITALS — DIASTOLIC BLOOD PRESSURE: 72 MMHG | HEART RATE: 65 BPM | RESPIRATION RATE: 18 BRPM | SYSTOLIC BLOOD PRESSURE: 118 MMHG

## 2018-09-11 VITALS
HEIGHT: 73 IN | TEMPERATURE: 98.9 F | RESPIRATION RATE: 20 BRPM | BODY MASS INDEX: 25.57 KG/M2 | WEIGHT: 192.9 LBS | HEART RATE: 66 BPM | SYSTOLIC BLOOD PRESSURE: 116 MMHG | DIASTOLIC BLOOD PRESSURE: 67 MMHG

## 2018-09-11 DIAGNOSIS — C78.7 RECTAL CANCER METASTASIZED TO LIVER (HCC): ICD-10-CM

## 2018-09-11 DIAGNOSIS — C19 COLORECTAL CANCER (HCC): Primary | ICD-10-CM

## 2018-09-11 DIAGNOSIS — C20 RECTAL CANCER METASTASIZED TO LIVER (HCC): ICD-10-CM

## 2018-09-11 PROCEDURE — 96417 CHEMO IV INFUS EACH ADDL SEQ: CPT

## 2018-09-11 PROCEDURE — 96366 THER/PROPH/DIAG IV INF ADDON: CPT

## 2018-09-11 PROCEDURE — 96415 CHEMO IV INFUSION ADDL HR: CPT

## 2018-09-11 PROCEDURE — 96375 TX/PRO/DX INJ NEW DRUG ADDON: CPT

## 2018-09-11 PROCEDURE — 6360000002 HC RX W HCPCS: Performed by: INTERNAL MEDICINE

## 2018-09-11 PROCEDURE — 96413 CHEMO IV INFUSION 1 HR: CPT

## 2018-09-11 PROCEDURE — 96368 THER/DIAG CONCURRENT INF: CPT

## 2018-09-11 PROCEDURE — 96411 CHEMO IV PUSH ADDL DRUG: CPT

## 2018-09-11 PROCEDURE — 99214 OFFICE O/P EST MOD 30 MIN: CPT | Performed by: INTERNAL MEDICINE

## 2018-09-11 PROCEDURE — 2580000003 HC RX 258: Performed by: INTERNAL MEDICINE

## 2018-09-11 RX ORDER — DEXAMETHASONE SODIUM PHOSPHATE 10 MG/ML
10 INJECTION, SOLUTION INTRAMUSCULAR; INTRAVENOUS ONCE
Status: COMPLETED | OUTPATIENT
Start: 2018-09-11 | End: 2018-09-11

## 2018-09-11 RX ORDER — METHYLPREDNISOLONE SODIUM SUCCINATE 125 MG/2ML
125 INJECTION, POWDER, LYOPHILIZED, FOR SOLUTION INTRAMUSCULAR; INTRAVENOUS ONCE
Status: CANCELLED | OUTPATIENT
Start: 2018-09-11 | End: 2018-09-11

## 2018-09-11 RX ORDER — PALONOSETRON 0.05 MG/ML
0.25 INJECTION, SOLUTION INTRAVENOUS ONCE
Status: CANCELLED | OUTPATIENT
Start: 2018-09-11

## 2018-09-11 RX ORDER — SODIUM CHLORIDE 0.9 % (FLUSH) 0.9 %
10 SYRINGE (ML) INJECTION PRN
Status: CANCELLED | OUTPATIENT
Start: 2018-09-11

## 2018-09-11 RX ORDER — SODIUM CHLORIDE 9 MG/ML
250 INJECTION, SOLUTION INTRAVENOUS CONTINUOUS
Status: CANCELLED | OUTPATIENT
Start: 2018-09-11 | End: 2018-09-29

## 2018-09-11 RX ORDER — SODIUM CHLORIDE 9 MG/ML
250 INJECTION, SOLUTION INTRAVENOUS CONTINUOUS
Status: DISCONTINUED | OUTPATIENT
Start: 2018-09-11 | End: 2018-09-12 | Stop reason: HOSPADM

## 2018-09-11 RX ORDER — HEPARIN SODIUM (PORCINE) LOCK FLUSH IV SOLN 100 UNIT/ML 100 UNIT/ML
500 SOLUTION INTRAVENOUS PRN
Status: DISCONTINUED | OUTPATIENT
Start: 2018-09-11 | End: 2018-09-12 | Stop reason: HOSPADM

## 2018-09-11 RX ORDER — PALONOSETRON 0.05 MG/ML
0.25 INJECTION, SOLUTION INTRAVENOUS ONCE
Status: COMPLETED | OUTPATIENT
Start: 2018-09-11 | End: 2018-09-11

## 2018-09-11 RX ORDER — FLUOROURACIL 50 MG/ML
850 INJECTION, SOLUTION INTRAVENOUS ONCE
Status: CANCELLED | OUTPATIENT
Start: 2018-09-11

## 2018-09-11 RX ORDER — ATROPINE SULFATE 0.4 MG/ML
0.4 AMPUL (ML) INJECTION ONCE
Status: CANCELLED | OUTPATIENT
Start: 2018-09-11 | End: 2018-09-11

## 2018-09-11 RX ORDER — 0.9 % SODIUM CHLORIDE 0.9 %
10 VIAL (ML) INJECTION ONCE
Status: CANCELLED | OUTPATIENT
Start: 2018-09-11 | End: 2018-09-11

## 2018-09-11 RX ORDER — SODIUM CHLORIDE 9 MG/ML
INJECTION, SOLUTION INTRAVENOUS CONTINUOUS
Status: CANCELLED | OUTPATIENT
Start: 2018-09-11

## 2018-09-11 RX ORDER — FLUOROURACIL 50 MG/ML
850 INJECTION, SOLUTION INTRAVENOUS ONCE
Status: COMPLETED | OUTPATIENT
Start: 2018-09-11 | End: 2018-09-11

## 2018-09-11 RX ORDER — DIPHENHYDRAMINE HYDROCHLORIDE 50 MG/ML
50 INJECTION INTRAMUSCULAR; INTRAVENOUS ONCE
Status: CANCELLED | OUTPATIENT
Start: 2018-09-11 | End: 2018-09-11

## 2018-09-11 RX ORDER — HEPARIN SODIUM (PORCINE) LOCK FLUSH IV SOLN 100 UNIT/ML 100 UNIT/ML
500 SOLUTION INTRAVENOUS PRN
Status: CANCELLED | OUTPATIENT
Start: 2018-09-11

## 2018-09-11 RX ADMIN — SODIUM CHLORIDE 250 ML: 9 INJECTION, SOLUTION INTRAVENOUS at 09:02

## 2018-09-11 RX ADMIN — BEVACIZUMAB 400 MG: 400 INJECTION, SOLUTION INTRAVENOUS at 09:02

## 2018-09-11 RX ADMIN — DEXAMETHASONE SODIUM PHOSPHATE 10 MG: 10 INJECTION, SOLUTION INTRAMUSCULAR; INTRAVENOUS at 09:40

## 2018-09-11 RX ADMIN — LEUCOVORIN CALCIUM 850 MG: 350 INJECTION, POWDER, LYOPHILIZED, FOR SOLUTION INTRAMUSCULAR; INTRAVENOUS at 10:14

## 2018-09-11 RX ADMIN — IRINOTECAN HYDROCHLORIDE 400 MG: 100 INJECTION, SOLUTION INTRAVENOUS at 10:14

## 2018-09-11 RX ADMIN — PALONOSETRON HYDROCHLORIDE 0.25 MG: 0.25 INJECTION INTRAVENOUS at 09:40

## 2018-09-11 RX ADMIN — Medication 500 UNITS: at 12:01

## 2018-09-11 RX ADMIN — FLUOROURACIL 850 MG: 50 INJECTION, SOLUTION INTRAVENOUS at 12:02

## 2018-09-11 NOTE — PROGRESS NOTES
[place] [id number]  Attending Clinic Note     Reason for Visit: Follow-up on a patient with Metastatic Rectal Cancer.     PCP: Yuliya Dillon MD     History of Present Illness:  71 y/o  male who was referred to see Dr. Elkin Blue (GI team) for evaluation of bright red blood per rectum, mild anemia and change in bowel habits with diarrhea. CEA 2640 on 10/19/2015. AlcP 299 AST 57 ALT 75 on 10/19/2015. Colonoscopy in 2013 noted no significant polyps, colitis or lesions at that time. Denies any Family History of colorectal cancer or polyps.     Colonoscopy on 10/19/2015 revealed:  1. Ascending polyp, 8 mm, hot snare: Tubulovillous adenoma. 2. Transverse polyp, 1 cm, hot snare: Serrated polyp most consistent with sessile serrated adenoma. 3. Five splenic flexure polyps, three - 5 mm, 7 mm, 8 mm, hot snare and biopsy: Four segments of Tubular Adenoma  4. Descending polyp, 5 mm, biopsy: Tubular adenoma. 5. Sigmoid polyp, 4 mm biopsy, Serrated polyp most consistent with sessile serrated adenoma. 6. Two rectal polyps, 4 mm biopsy: Serrated polyp most consistent with hyperplastic polyp. 7. Rectosigmoid colon mass (large mass approximately 65% circumference of the lumen; Very friable, firm and hard): Tubulovillous adenoma with associated focal erosion and fibroplasia.      CT scan abdomen/pelvis on 10/26/2015:  1. Small nodules at lung bases likely represent metastatic colon   cancer. 2. Extensive likely metastatic colon cancer throughout the liver.      3. Mild mural thickening and luminal narrowing in the   terminal ileum, otherwise nonspecific.      Colonoscopy with snare removal rectal mass was performed by Dr. Krystal Richey. Pathology proved:  Rectal polyp: Invasive adenocarcinoma involving villous adenoma and extending to the cauterized edge of excision. KRAS Mutation: Mutation detected. BRAF Mutation: Mutation not detected.   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     CEA 3488. Bone scan on 11/10/2015 noted no metastatic disease. CT chest on 11/10/2015 revealed Multiple pulmonary nodules in the upper and lower lobes consistent with metastatic disease;   Hepatic metastasis also visualized. 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 Mediport was placed. Cycle # 1 of FOLFOX + Avastin was on 11/23/2015. CEA was 4555 on 11/23/2015. Cycle # 2 of FOLFOX + Avastin was on 12/08/2015. CEA was 3160 on 12/08/2015. Cycle # 3 of FOLFOX + Avastin was on 12/22/2015. CEA was 2516 on 12/21/2015. Cycle # 4 of FOLFOX + Avastin was on 01/05/2016. CEA was 2098 on 01/05/2015. Cycle # 5 of FOLFOX + Avastin was on 01/19/2016. CEA was 1511 on 01/05/2015.     -Bone scan on 01/26/2016 noted no metastatic disease. CT chest on 01/26/2016 revealed Significant response to treatment with no visible residual nodules. CT scan abdomen/pelvis on 01/26/2016 noted Interval decreased size of multiple masses in the liver compatible with treatment response. Continue another 2 months of FOLFOX + Avastin and repeat scans. Cycle # 6 of FOLFOX + Avastin was on 02/02/2016.  on 02/02/2016. Cycle # 7 of FOLFOX + Avastin was on 02/16/2016.  on 02/16/2016. Cycle # 8 of FOLFOX + Avastin was on 03/01/2016.  on 03/01/2016. Cycle # 9 of FOLFOX + Avastin was on 03/15/2016.  on 03/15/2016. Cycle # 10 of FOLFOX + Avastin was on 03/29/2016. .4 on 03/29/2016. Cycle # 11 of FOLFOX + Avastin was on 04/12/2016. .4 on 04/12/2016.     Re-staging scans on 04/19/2016: CT Chest: clear lungs; no evidence of recurring pulmonary nodule; CT Abdomen/Pelvis: Further interval decrease in size of the multiple metastatic hepatic lesions, the largest lesion now measures 3.9 x 3.5 cm and previously measured 4.5 cm in maximum diameter. No mesenteric lymphadenopathy is identified; Bone Scan: No evidence of osseous metastasis.   Continue same regimen and re-stage in 2-3 months. Cycle # 12 FOLFOX + Avastin was on 04/26/2016. .5 on 04/26/2016. Cycle # 13 FOLFOX + Avastin was on 05/10/2016. .3 on 05/10/2016. Cycle # 14 FOLFOX+AVASTIN was on 05/24/2016. .5 on 05/24/2016. Cycle # 15 FOLFOX + Avastin was on 06/07/2016. CEA 90.5 on 06/07/2016. Admitted to [facility] 06/13/2016-06/16/2016 for abdominal pain: EGD noted 1.5 cm clean based duodenal bulb ulceration s/p epinephrine and bicap per Dr. Judy Smart. No active bleeding. A. Stomach, biopsy: Mild chronic gastritis, immunostain negative for Helicobacter  B. Esophagus, biopsy: Gastric glandular mucosa with prominent intestinal metaplasia (Madrid's epithelium), negative for epithelial dysplasia, esophageal squamous mucosa not identified  Cycle # 16 FOLFOX (discontinued avastin (bevacizumab) given association of peptic ulcer disease and known association of GI perforation) was on 06/21/2016. Cycle # 17 FOLFOX was on 07/05/2016. CEA 52.6 on 07/19/2016. Cycle # 17 FOLFOX was on 07/05/2016. CEA 52.6 on 07/05/2016. CEA 47.6 on 07/19/2016.     Re-staging scans 07/19/2106: CT Chest negative for metastatic disease. CT Abdomen/Pelvis: Stable hepatic lesions; Question new mural thickening in the cecum. Bone Scan: New Let hip lesion suspicious for bone metastasis.     Increased CEA; new mural thickening in the cecum and new left hip lesion suspicious for bone metastasis are consistent with disease progression; He derived maximum benefit from FOLFOX/AVASTIN. D/C FOLFOX/AVASTIN. We recommended FOLFIRI second line therapy. Cycle # 1 FOLFIRI was on 08/02/2016. CEA 40.8 on 08/02/2016. Xgeva q4 weeks started on 08/02/2016. Cycle # 2 FOLFIRI was on 08/16/2016. CEA 31.7 on 08/16/2016. Cycle # 3 FOLFIRI (added Avastin) was on 08/30/2016 given that ulcers healed on EGD 08/15/2016 by Dr. Arlice Epley; Protonix bid since avastin re-started. Colonoscopy on 08/29/2016 (to look at the cecal area) unremarkable.  CEA 26.7 extensive liver lesions consistent with metastatic rectosigmoid cancer. CEA 3488 on 10/30/2015. Bone scan on 11/10/2015 noted no metastatic disease. CT chest on 11/10/2015 revealed Multiple pulmonary nodules in the upper and lower lobes consistent with metastatic disease; Hepatic metastasis also visualized. 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 Mediport was placed. Cycle # 1 of FOLFOX + Avastin was on 11/23/2015. CEA was 4555 on 11/23/2015. Cycle # 2 of FOLFOX + Avastin was on 12/08/2015. CEA was 3160 on 12/08/2015. Cycle # 3 of FOLFOX + Avastin was on 12/22/2015. CEA was 2516 on 12/21/2015. Cycle # 4 of FOLFOX + Avastin was on 01/05/2016. CEA was 2098 on 01/05/2015. Cycle # 5 of FOLFOX + Avastin was on 01/19/2016. CEA was 1511 on 01/05/2015.     -Bone scan on 01/26/2016 noted no metastatic disease. CT chest on 01/26/2016 revealed Significant response to treatment with no visible residual nodules. CT scan abdomen/pelvis on 01/26/2016 noted Interval decreased size of multiple masses in the liver compatible with treatment response. Continue another 2 months of FOLFOX + Avastin and repeat scans. Cycle # 6 of FOLFOX + Avastin was on 02/02/2016.  on 02/02/2016. Cycle # 7 of FOLFOX + Avastin was on 02/16/2016.  on 02/16/2016. Cycle # 8 of FOLFOX + Avastin was on 03/01/2016.  on 03/01/2016. Cycle # 9 of FOLFOX + Avastin was on 03/15/2016.  on 03/15/2016. Cycle # 10 of FOLFOX + Avastin was on 03/29/2016. .4 on 03/29/2016. Cycle # 11 of FOLFOX + Avastin was on 04/12/2016. .4 on 04/12/2016.     Re-staging scans on 04/19/2016: CT Chest: clear lungs; no evidence of recurring pulmonary nodule; CT Abdomen/Pelvis: Further interval decrease in size of the multiple metastatic hepatic lesions, the largest lesion now measures 3.9 x 3.5 cm and previously measured 4.5 cm in maximum diameter.  No mesenteric lymphadenopathy is identified; Bone Scan: No evidence of osseous metastasis. Continue same regimen and re-stage in 2-3 months. Cycle # 12 FOLFOX + Avastin was on 04/26/2016. .5 on 04/26/2016. Cycle # 13 FOLFOX + Avastin was on 05/10/2016. .3 on 05/10/2016. Cycle # 14 FOLFOX+AVASTIN was on 05/24/2016. .5 on 05/24/2016. Cycle # 15 FOLFOX + Avastin was on 06/07/2016. CEA 90.5 on 06/07/2016. Admitted to [facility] 06/13/2016-06/16/2016 for abdominal pain: EGD noted 1.5 cm clean based duodenal bulb ulceration s/p epinephrine and bicap per Dr. Vidal Luna. No active bleeding. A. Stomach, biopsy: Mild chronic gastritis, immunostain negative for Helicobacter  B. Esophagus, biopsy: Gastric glandular mucosa with prominent ntestinal metaplasia (Mad[place]'s epithelium), negative for epithelial dysplasia, esophageal squamous mucosa not identified     Cycle # 16 FOLFOX (discontinued avastin (bevacizumab) given association of peptic ulcer disease and known association of GI perforation.) was on 06/21/2016. CEA 47 on 06/21/2016. Cycle # 17 FOLFOX was on 07/05/2016. CEA 52.6 on 07/05/2016. CEA 47.6 on 07/19/2016.     Re-staging scans 07/19/2106: CT Chest negative for metastatic disease. CT Abdomen/Pelvis: Stable hepatic lesions; Question new mural thickening in the cecum. Bone Scan: New Let hip lesion suspicious for bone metastasis.     Increased CEA; new mural thickening in the cecum and new left hip lesion suspicious for bone metastasis are consistent with disease progression; He derived maximum benefit from FOLFOX/AVASTIN. D/C FOLFOX/AVASTIN. We recommended FOLFIRI second line therapy. Cycle # 1 FOLFIRI was on 08/02/2016. CEA 40.8 on 08/02/2016. Xgeva q4 weeks started on 08/02/2016. Cycle # 2 FOLFIRI was on 08/16/2016. CEA 31.7 on 08/16/2016. Cycle # 3 FOLFIRI (added Avastin) was on 08/30/2016 given that ulcers healed on EGD 08/15/2016 by Dr. Luis Alberto Best; Protonix bid since avastin re-started.    Colonoscopy on 08/29/2016 (to

## 2018-09-11 NOTE — PROGRESS NOTES
Pt presents to clinic for chemotherapy. Pt tolerated treatment well without complications. Pt was discharged ambulatory in stable condition.  Pt was left accessed due to placing pump on at home

## 2018-09-13 ENCOUNTER — HOSPITAL ENCOUNTER (OUTPATIENT)
Dept: INFUSION THERAPY | Age: 68
Discharge: HOME | End: 2018-09-13
Payer: MEDICARE

## 2018-09-13 DIAGNOSIS — C78.7 RECTAL CANCER METASTASIZED TO LIVER (HCC): ICD-10-CM

## 2018-09-13 DIAGNOSIS — C20 RECTAL CANCER METASTASIZED TO LIVER (HCC): ICD-10-CM

## 2018-09-13 PROCEDURE — 6360000002 HC RX W HCPCS: Performed by: INTERNAL MEDICINE

## 2018-09-13 PROCEDURE — 96372 THER/PROPH/DIAG INJ SC/IM: CPT

## 2018-09-13 RX ADMIN — PEGFILGRASTIM 6 MG: 6 INJECTION SUBCUTANEOUS at 13:51

## 2018-09-24 ENCOUNTER — HOSPITAL ENCOUNTER (OUTPATIENT)
Age: 68
Discharge: HOME | End: 2018-09-24
Payer: MEDICARE

## 2018-09-24 LAB
ALBUMIN SERPL-MCNC: 3.7 G/DL (ref 3.5–5.2)
ALP BLD-CCNC: 158 U/L (ref 40–129)
ALT SERPL-CCNC: 24 U/L (ref 0–40)
ANION GAP SERPL CALCULATED.3IONS-SCNC: 8 MMOL/L (ref 7–16)
AST SERPL-CCNC: 30 U/L (ref 0–39)
BASOPHILS ABSOLUTE: 0.03 E9/L (ref 0–0.2)
BASOPHILS RELATIVE PERCENT: 1.1 % (ref 0–2)
BILIRUB SERPL-MCNC: 0.7 MG/DL (ref 0–1.2)
BUN BLDV-MCNC: 15 MG/DL (ref 8–23)
CALCIUM SERPL-MCNC: 9.6 MG/DL (ref 8.6–10.2)
CEA: 6.4 NG/ML (ref 0–5.2)
CHLORIDE BLD-SCNC: 104 MMOL/L (ref 98–107)
CO2: 29 MMOL/L (ref 22–29)
CREAT SERPL-MCNC: 1 MG/DL (ref 0.7–1.2)
EOSINOPHILS ABSOLUTE: 0.07 E9/L (ref 0.05–0.5)
EOSINOPHILS RELATIVE PERCENT: 2.6 % (ref 0–6)
GFR AFRICAN AMERICAN: >60
GFR NON-AFRICAN AMERICAN: >60 ML/MIN/1.73
GLUCOSE FASTING: 98 MG/DL (ref 74–109)
HCT VFR BLD CALC: 29.5 % (ref 37–54)
HEMOGLOBIN: 9.6 G/DL (ref 12.5–16.5)
IMMATURE GRANULOCYTES #: 0.04 E9/L
IMMATURE GRANULOCYTES %: 1.5 % (ref 0–5)
LYMPHOCYTES ABSOLUTE: 0.73 E9/L (ref 1.5–4)
LYMPHOCYTES RELATIVE PERCENT: 26.9 % (ref 20–42)
MCH RBC QN AUTO: 32.9 PG (ref 26–35)
MCHC RBC AUTO-ENTMCNC: 32.5 % (ref 32–34.5)
MCV RBC AUTO: 101 FL (ref 80–99.9)
MONOCYTES ABSOLUTE: 0.28 E9/L (ref 0.1–0.95)
MONOCYTES RELATIVE PERCENT: 10.3 % (ref 2–12)
NEUTROPHILS ABSOLUTE: 1.56 E9/L (ref 1.8–7.3)
NEUTROPHILS RELATIVE PERCENT: 57.6 % (ref 43–80)
PDW BLD-RTO: 16.1 FL (ref 11.5–15)
PLATELET # BLD: 100 E9/L (ref 130–450)
PMV BLD AUTO: 9.8 FL (ref 7–12)
POTASSIUM SERPL-SCNC: 3.9 MMOL/L (ref 3.5–5)
RBC # BLD: 2.92 E12/L (ref 3.8–5.8)
SODIUM BLD-SCNC: 141 MMOL/L (ref 132–146)
TOTAL PROTEIN: 6.7 G/DL (ref 6.4–8.3)
WBC # BLD: 2.7 E9/L (ref 4.5–11.5)

## 2018-09-24 PROCEDURE — 36415 COLL VENOUS BLD VENIPUNCTURE: CPT

## 2018-09-24 PROCEDURE — 85025 COMPLETE CBC W/AUTO DIFF WBC: CPT

## 2018-09-24 PROCEDURE — 82378 CARCINOEMBRYONIC ANTIGEN: CPT

## 2018-09-24 PROCEDURE — 80053 COMPREHEN METABOLIC PANEL: CPT

## 2018-09-25 ENCOUNTER — HOSPITAL ENCOUNTER (OUTPATIENT)
Dept: INFUSION THERAPY | Age: 68
Discharge: HOME | End: 2018-09-25
Payer: MEDICARE

## 2018-09-25 ENCOUNTER — OFFICE VISIT (OUTPATIENT)
Dept: ONCOLOGY | Age: 68
End: 2018-09-25
Payer: MEDICARE

## 2018-09-25 VITALS
WEIGHT: 194.7 LBS | HEART RATE: 65 BPM | BODY MASS INDEX: 25.8 KG/M2 | RESPIRATION RATE: 20 BRPM | HEIGHT: 73 IN | SYSTOLIC BLOOD PRESSURE: 108 MMHG | TEMPERATURE: 97.8 F | DIASTOLIC BLOOD PRESSURE: 70 MMHG

## 2018-09-25 VITALS — HEART RATE: 58 BPM | SYSTOLIC BLOOD PRESSURE: 139 MMHG | RESPIRATION RATE: 20 BRPM | DIASTOLIC BLOOD PRESSURE: 68 MMHG

## 2018-09-25 DIAGNOSIS — C20 RECTAL CANCER METASTASIZED TO LIVER (HCC): ICD-10-CM

## 2018-09-25 DIAGNOSIS — C79.51 BONE METASTASIS (HCC): ICD-10-CM

## 2018-09-25 DIAGNOSIS — C20 RECTAL CANCER (HCC): Primary | ICD-10-CM

## 2018-09-25 DIAGNOSIS — C78.7 RECTAL CANCER METASTASIZED TO LIVER (HCC): ICD-10-CM

## 2018-09-25 PROCEDURE — 96413 CHEMO IV INFUSION 1 HR: CPT

## 2018-09-25 PROCEDURE — 6360000002 HC RX W HCPCS: Performed by: INTERNAL MEDICINE

## 2018-09-25 PROCEDURE — 99214 OFFICE O/P EST MOD 30 MIN: CPT | Performed by: INTERNAL MEDICINE

## 2018-09-25 PROCEDURE — 96411 CHEMO IV PUSH ADDL DRUG: CPT

## 2018-09-25 PROCEDURE — 96417 CHEMO IV INFUS EACH ADDL SEQ: CPT

## 2018-09-25 PROCEDURE — 96372 THER/PROPH/DIAG INJ SC/IM: CPT

## 2018-09-25 PROCEDURE — 96415 CHEMO IV INFUSION ADDL HR: CPT

## 2018-09-25 PROCEDURE — 2580000003 HC RX 258: Performed by: INTERNAL MEDICINE

## 2018-09-25 PROCEDURE — 96375 TX/PRO/DX INJ NEW DRUG ADDON: CPT

## 2018-09-25 RX ORDER — 0.9 % SODIUM CHLORIDE 0.9 %
10 VIAL (ML) INJECTION ONCE
Status: CANCELLED | OUTPATIENT
Start: 2018-09-25 | End: 2018-09-25

## 2018-09-25 RX ORDER — SODIUM CHLORIDE 9 MG/ML
250 INJECTION, SOLUTION INTRAVENOUS CONTINUOUS
Status: DISCONTINUED | OUTPATIENT
Start: 2018-09-25 | End: 2018-09-26 | Stop reason: HOSPADM

## 2018-09-25 RX ORDER — METHYLPREDNISOLONE SODIUM SUCCINATE 125 MG/2ML
125 INJECTION, POWDER, LYOPHILIZED, FOR SOLUTION INTRAMUSCULAR; INTRAVENOUS ONCE
Status: CANCELLED | OUTPATIENT
Start: 2018-09-25 | End: 2018-09-25

## 2018-09-25 RX ORDER — FLUOROURACIL 50 MG/ML
850 INJECTION, SOLUTION INTRAVENOUS ONCE
Status: COMPLETED | OUTPATIENT
Start: 2018-09-25 | End: 2018-09-25

## 2018-09-25 RX ORDER — PALONOSETRON 0.05 MG/ML
0.25 INJECTION, SOLUTION INTRAVENOUS ONCE
Status: CANCELLED | OUTPATIENT
Start: 2018-09-25

## 2018-09-25 RX ORDER — SODIUM CHLORIDE 9 MG/ML
INJECTION, SOLUTION INTRAVENOUS CONTINUOUS
Status: CANCELLED | OUTPATIENT
Start: 2018-09-25

## 2018-09-25 RX ORDER — DIPHENHYDRAMINE HYDROCHLORIDE 50 MG/ML
50 INJECTION INTRAMUSCULAR; INTRAVENOUS ONCE
Status: CANCELLED | OUTPATIENT
Start: 2018-09-25 | End: 2018-09-25

## 2018-09-25 RX ORDER — SODIUM CHLORIDE 0.9 % (FLUSH) 0.9 %
10 SYRINGE (ML) INJECTION PRN
Status: DISCONTINUED | OUTPATIENT
Start: 2018-09-25 | End: 2018-09-26 | Stop reason: HOSPADM

## 2018-09-25 RX ORDER — PALONOSETRON 0.05 MG/ML
0.25 INJECTION, SOLUTION INTRAVENOUS ONCE
Status: COMPLETED | OUTPATIENT
Start: 2018-09-25 | End: 2018-09-25

## 2018-09-25 RX ORDER — SODIUM CHLORIDE 9 MG/ML
250 INJECTION, SOLUTION INTRAVENOUS CONTINUOUS
Status: CANCELLED | OUTPATIENT
Start: 2018-09-25 | End: 2018-10-12

## 2018-09-25 RX ORDER — ATROPINE SULFATE 0.4 MG/ML
0.4 AMPUL (ML) INJECTION ONCE
Status: CANCELLED | OUTPATIENT
Start: 2018-09-25 | End: 2018-09-25

## 2018-09-25 RX ORDER — SODIUM CHLORIDE 0.9 % (FLUSH) 0.9 %
10 SYRINGE (ML) INJECTION PRN
Status: CANCELLED | OUTPATIENT
Start: 2018-09-25

## 2018-09-25 RX ORDER — DEXAMETHASONE SODIUM PHOSPHATE 100 MG/10ML
10 INJECTION INTRAMUSCULAR; INTRAVENOUS ONCE
Status: COMPLETED | OUTPATIENT
Start: 2018-09-25 | End: 2018-09-25

## 2018-09-25 RX ORDER — HEPARIN SODIUM (PORCINE) LOCK FLUSH IV SOLN 100 UNIT/ML 100 UNIT/ML
500 SOLUTION INTRAVENOUS PRN
Status: CANCELLED | OUTPATIENT
Start: 2018-09-25

## 2018-09-25 RX ORDER — FLUOROURACIL 50 MG/ML
850 INJECTION, SOLUTION INTRAVENOUS ONCE
Status: CANCELLED | OUTPATIENT
Start: 2018-09-25

## 2018-09-25 RX ADMIN — SODIUM CHLORIDE 250 ML: 9 INJECTION, SOLUTION INTRAVENOUS at 09:15

## 2018-09-25 RX ADMIN — DENOSUMAB 120 MG: 120 INJECTION SUBCUTANEOUS at 10:01

## 2018-09-25 RX ADMIN — Medication 10 ML: at 12:03

## 2018-09-25 RX ADMIN — PALONOSETRON HYDROCHLORIDE 0.25 MG: 0.25 INJECTION INTRAVENOUS at 09:54

## 2018-09-25 RX ADMIN — BEVACIZUMAB 400 MG: 400 INJECTION, SOLUTION INTRAVENOUS at 09:17

## 2018-09-25 RX ADMIN — SODIUM CHLORIDE 400 MG: 9 INJECTION, SOLUTION INTRAVENOUS at 10:18

## 2018-09-25 RX ADMIN — DEXAMETHASONE SODIUM PHOSPHATE 10 MG: 10 INJECTION, SOLUTION INTRAMUSCULAR; INTRAVENOUS at 09:56

## 2018-09-25 RX ADMIN — FLUOROURACIL 850 MG: 50 INJECTION, SOLUTION INTRAVENOUS at 11:57

## 2018-09-25 RX ADMIN — LEUCOVORIN CALCIUM 850 MG: 350 INJECTION, POWDER, LYOPHILIZED, FOR SOLUTION INTRAMUSCULAR; INTRAVENOUS at 10:18

## 2018-09-25 NOTE — PROGRESS NOTES
re-stage in 2-3 months. Cycle # 12 FOLFOX + Avastin was on 04/26/2016. .5 on 04/26/2016. Cycle # 13 FOLFOX + Avastin was on 05/10/2016. .3 on 05/10/2016. Cycle # 14 FOLFOX+AVASTIN was on 05/24/2016. .5 on 05/24/2016. Cycle # 15 FOLFOX + Avastin was on 06/07/2016. CEA 90.5 on 06/07/2016. Admitted to [facility] 06/13/2016-06/16/2016 for abdominal pain: EGD noted 1.5 cm clean based duodenal bulb ulceration s/p epinephrine and bicap per Dr. Delma Cherry. No active bleeding. A. Stomach, biopsy: Mild chronic gastritis, immunostain negative for Helicobacter  B. Esophagus, biopsy: Gastric glandular mucosa with prominent intestinal metaplasia (Mad[place]'s epithelium), negative for epithelial dysplasia, esophageal squamous mucosa not identified  Cycle # 16 FOLFOX (discontinued avastin (bevacizumab) given association of peptic ulcer disease and known association of GI perforation) was on 06/21/2016. Cycle # 17 FOLFOX was on 07/05/2016. CEA 52.6 on 07/19/2016. Cycle # 17 FOLFOX was on 07/05/2016. CEA 52.6 on 07/05/2016. CEA 47.6 on 07/19/2016.     Re-staging scans 07/19/2106: CT Chest negative for metastatic disease. CT Abdomen/Pelvis: Stable hepatic lesions; Question new mural thickening in the cecum. Bone Scan: New Let hip lesion suspicious for bone metastasis.     Increased CEA; new mural thickening in the cecum and new left hip lesion suspicious for bone metastasis are consistent with disease progression; He derived maximum benefit from FOLFOX/AVASTIN. D/C FOLFOX/AVASTIN. We recommended FOLFIRI second line therapy. Cycle # 1 FOLFIRI was on 08/02/2016. CEA 40.8 on 08/02/2016. Xgeva q4 weeks started on 08/02/2016. Cycle # 2 FOLFIRI was on 08/16/2016. CEA 31.7 on 08/16/2016. Cycle # 3 FOLFIRI (added Avastin) was on 08/30/2016 given that ulcers healed on EGD 08/15/2016 by Dr. Jovita Castellon; Protonix bid since avastin re-started. Colonoscopy on 08/29/2016 (to look at the cecal area) unremarkable.  CEA 26.7 Avastin was on 07/24/2018. Cycle # 52 FOLFIRI + Avastin was on 08/14/2018. Cycle # 53 FOLFIRI + Avastin was on 08/28/2018. CT chest 09/06/2018 noted interval decrease in size of LUCY measuring up to 4 mm, suggestive of treatment response. No mediastinal or hilar LN  CT abdomen/pelvis 09/06/2018 Slight interval increase in size of a previously noted metastatic lesion within the right hepatic lobe. This may be related to differences in phase of enhancement compared to the most recent study from 5/31/2018, the lesion remains decreased in size compared to the more previous studies. No abdominal, retroperitoneal, or pelvic lymphadenopathy. Continue FOLFIRI + Avastin and repeat scans in 2 months. Cycle # 54 FOLFIRI + Avastin was on 09/11/2018. Cycle # 54 today 09/25/2018. Review of Systems;  CONSTITUTIONAL: No fever, chills. Fair appetite and fair energy level. ENMT: Eyes: no diplopia; Nose: No epistaxis. Mouth: No oral lesions. RESPIRATORY: No hemoptysis, shortness of breath, cough. CARDIOVASCULAR: No chest pain, palpitations. GASTROINTESTINAL: No nausea/vomiting, abdominal pain. GENITOURINARY: No dysuria, urinary frequency, hematuria. NEURO: No syncope, presyncope, headache. Remainder ROS: Negative. Past Medical History:   Past Medical History               Diagnosis Date    Arthritis      Cancer ([facility] [address]         colon    Depression      Hyperlipidemia      Hypertension           Medications:  Reviewed and reconciled.     Allergies: Allergies   Allergen Reactions    Neosporin [Neomycin-Polymyxin-Gramicidin] Rash    Tape Jarome Perone Tape] Rash      Physical Exam:  /70 (Site: Right Upper Arm, Position: Sitting, Cuff Size: Medium Adult)   Pulse 65   Temp 97.8 °F (36.6 °C) (Temporal)   Resp 20   Ht 6' 1\" (1.854 m)   Wt 194 lb 11.2 oz (88.3 kg)   BMI 25.69 kg/m²   GENERAL: Alert, oriented x 3, not in acute distress. HEENT: PERRLA, EOMI. No oral lesions. NECK: Supple.  Without suspicious for bone metastasis.     Increased CEA; new mural thickening in the cecum and new left hip lesion suspicious for bone metastasis are consistent with disease progression; He derived maximum benefit from FOLFOX/AVASTIN. D/C FOLFOX/AVASTIN. We recommended FOLFIRI second line therapy. Cycle # 1 FOLFIRI was on 08/02/2016. CEA 40.8 on 08/02/2016. Xgeva q4 weeks started on 08/02/2016. Cycle # 2 FOLFIRI was on 08/16/2016. CEA 31.7 on 08/16/2016. Cycle # 3 FOLFIRI (added Avastin) was on 08/30/2016 given that ulcers healed on EGD 08/15/2016 by Dr. Guillermina Zheng; Protonix bid since avastin re-started. Colonoscopy on 08/29/2016 (to look at the cecal area) unremarkable. CEA 26.7 on 08/30/2016. Cycle # 4 FOLFIRI/Avastin was on 09/13/2016. CEA 23.4 on 09/13/2016. Cycle # 5 FOLFIRI/Avastin was on 09/27/2016. CEA 22.3 on 09/27/2016. Cycle # 6 FOLFIRI/Avastin was on 10/11/2016. CEA 18.4 on 10/11/2016.      Bone scan 10/21/2016 stable bone metastasis; ? New lesion anterior left sixth rib likely interval healing fracture. CT abdomen/pelvis revealed stable hepatic metastasis. CT chest negative for metastatic disease. Continue FOLFIRI/Avastin and repeat scans in 3 months. Cycle # 7 FOLFIRI/Avastin was on 10/25/2016. CEA 16.1 on 10/25/2016. Cycle # 8 FOLFIRI/Avastin was on 11/08/2016. CEA 15.7 on 11/08/2016. Cycle # 9 FOLFIRI/Avastin was on 11/29/2016. CEA 13.6 on 11/29/2016. Cycle # 10 FOLFIRI/Avastin was on 12/13/2016. CEA 10.6 on 12/13/2016. Cycle # 11 FOLFIRI/Avastin was on 12/27/2016. CEA 11.1 on 12/27/2016. Cycle # 12 FOLFIRI/Avastin was on 01/10/2017. CEA 9.7 on 01/10/2017. CT chest 01/23/2017 No new pulmonary nodule or lymphadenopathy. Patchy groundglass opacities within the left lower lobe, suggestive of infectious or inflammatory etiology. Followup CT chest in 3 months.  Slight increased linear sclerosis along the left anterior sixth rib corresponding to an area of increased uptake on the prior bone scan from 10/21/2016, favored to be related to interval healing changes of a nondisplaced fracture. CT abdomen/pelvis on 01/23/2017 Redemonstration of multiple hepatic metastases, which are similar compared to the prior CT from 10/21/2016. Redemonstration of area of increased sclerosis along the right acetabulum suspicious for metastatic disease. Bone scan on 01/23/2017 Stable subtle uptake within the left proximal diaphysis, again suggestive of metastatic disease  Decreased subtle uptake within the medial right acetabulum. Decreased uptake within the left anterior sixth rib corresponding to linear sclerosis on the recent CT, favored to be related to an joint rib fracture. Continue FOLFIRI + Avastin and repeat scans in 3 months. Cycle # 13 FOLFIRI + Avastin was on 01/24/2017. Cycle # 14 FOLFIRI + Avastin was on 02/07/2017. Cycle # 15 FOLFIRI + Avastin was on 02/21/2017. Cycle # 16 FOLFIRI + Avastin was on 03/07/2017. Cycle # 17 FOLFIRI + Avastin was on 03/21/2017. Cycle # 18 FOLFIRI + Avastin was on 04/04/2017. CT chest 04/17/2017 negative for metastatic disease. CT abdomen/pelvis 04/17/2017 Stable hypodense metastatic lesions within the liver. Stable area of increased sclerosis along the right acetabulum  Bone scan 04/17/2017 Stable subtle uptake within the left proximal femoral diaphysis; No definite abnormal uptake in the region of a sclerotic lesion along the posterior column of the right acetabulum noted on CT. Stable slight uptake within the left anterior sixth rib corresponding to linear sclerosis on the recent CT, favored to be related to a fracture. Continue FOLFIRI + Avastin and repeat scans in 3 months. Cycle # 19 FOLFIRI + Avastin was on 04/18/2017. CEA 7.5 on 04/18/2017. Cycle # 20 FOLFIRI + Avastin was on 05/02/2017. CEA 7.7 on 05/02/2017. Cycle # 21 FOLFIRI + Avastin was on 05/16/2017. CEA 7.1 on 05/16/2017. Cycle # 22 FOLFIRI + Avastin was on 05/30/2017. CEA 8.2 on 05/30/2017. Cycle # 23 FOLFIRI + Avastin was on 06/13/2017. CEA 7.7 on 06/13/2017. Cycle # 24 FOLFIRI + Avastin was on 06/27/2017. CEA 6.6 on 06/27/2017. Re-staging scans 07/05/2017:  Bone scan stable proximal left femoral diaphysis, right acetabulum, and left anterior sixth rib lesions;  CT abdomen/pelvis stable hypodense metastatic lesions within the liver; CT chest without convincing evidence of metastatic disease. Cycle # 25 FOLFIRI + Avastin was on 07/11/2017. CEA 6.3 on 07/11/2017. Cycle # 26 FOLFIRI + Avastin was on 07/25/2017. CEA 7.3 on 07/25/2017. Cycle # 27 FOLFIRI + Avastin was on 08/08/2017. CEA 6.5 on 08/08/2017. Cycle # 28 FOLFIRI + Avastin was on 08/22/2017. CEA 5.9 on 08/22/2017. Cycle # 29 FOLFIRI + Avastin was on 09/05/2017. CEA 6.3 on 09/05/2017. Cycle # 30 FOLFIRI + Avastin was on 09/19/2017. CEA 6.3 on 09/19/2017. Bone scan 09/26/2017 stable. CT abdomen/pelvis on 09/26/2017 Stable hypodense mass in the liver. Stable sclerotic lesion in the acetabulum. CT chest 09/26/2017 negative for metastatic disease. Cycle # 31 FOLFIRI + Avastin was on 10/03/2017. CEA 7.4 on 10/03/2017. Cycle # 32 FOLFIRI + Avastin was on 10/17/2017. CEA 6.0 on 10/17/2017. Cycle # 33 FOLFIRI + Avastin was on 10/31/2017. CEA 6.8 on 10/31/2017. Cycle # 34 FOLFIRI + Avastin was on 11/14/2017. CEA 7.2 on 11/14/2017. Cycle # 35 FOLFIRI + Avastin was on 11/28/2017. CEA 5.1 on 11/28/2017. Cycle # 36 FOLFIRI + Avastin was on 12/12/2017. CEA 6.1 on 12/12/2017. Bone scan 12/22/2017 noted no evidence of metastatic disease to the axial or appendicular skeleton. CT chest 12/22/2017 noted no evidence of metastatic disease. CT abdomen/pelvis 12/22/2017 noted stable hypodense lesions in the liver. Continue FOLFIRI + Avastin and repeat scans in 3 months. Cycle # 37 FOLFIRI + Avastin was on 12/27/2018. CEA 6.7 on 12/27/2017. Cycle # 38 FOLFIRI + Avastin was on 01/09/2018.  CEA 5.0 on 01/09/2018. Cycle # 39 FOLFIRI + Avastin was on 01/23/2018. CEA 6.5 on 01/23/2018. Cycle # 40 FOLFIRI + Avastin was on 02/06/2018. CEA 6.9 on 02/06/2018. Cycle # 41 FOLFIRI + Avastin was on 02/20/2018. CEA 6.4 on 02/20/2018. Cycle # 42 FOLFIRI + Avastin was on 03/06/2018. CEA 6.6 on 03/06/2018. Cycle # 43 FOLFIRI + Avastin was on 03/20/2018. CEA 5.7 on 03/20/2018. Bone scan on 03/26/2018 negative for metastatic disease. CT chest 03/26/2018 noted ? new 7 mm nodule in LUCY. CT abdomen/pelvis 03/26/2018 noted stable hypodense lesions in the liver. ? New small ascites in the abdomen. Patient wants to continue to monitor the lung finding and repeat scans in 2 months instead of changing the current chemotherapy regimen to oral Lonsurf. Cycle # 44 FOLFIRI + Avastin was on 04/03/2018. CEA 6.3 on 04/03/2018. Cycle # 45 FOLFIRI + Avastin was on 04/24/2018. CEA 5.3 on 04/24/2018. Cycle # 46 FOLFIRI + Avastin was on 05/08/2018. CEA 5.4 on 05/08/2018. Cycle # 47 FOLFIRI + Avastin was on 05/22/2018. CEA 6.1 on 05/22/2018. CT chest 05/31/2018 noted stable nodule in LUCY. No evidence of worsening malignancy. CT abdomen/pelvis on 05/31/2018 noted stable liver metastasis. No evidence of worsening malignancy. Continue FOLFIRI + Avastin and repeat scans in 3 months. Cycle # 48 FOLFIRI + Avastin was on 06/06/2018. CEA 6.3 on 06/05/2018. Cycle # 49 FOLFIRI + Avastin was on 06/19/2018. CEA 6.1 on 06/19/2018. Cycle # 50 FOLFIRI + Avastin was on 07/03/2018. CEA 7.4 on 07/03/2018. Cycle # 51 FOLFIRI + Avastin was on 07/24/2018. CEA 6.7 on 07/24/2018. Cycle # 52 FOLFIRI + Avastin was on 08/14/2018. CEA 6.8 on 08/14/2018. Cycle # 53 FOLFIRI + Avastin was on 08/28/2018. CEA 6.5 on 08/27/2018. CT chest 09/06/2018 noted interval decrease in size of LUCY measuring up to 4 mm, suggestive of treatment response.  No mediastinal or hilar LN  CT abdomen/pelvis 09/06/2018 Slight interval increase in size of a previously

## 2018-09-25 NOTE — PROGRESS NOTES
Patient presents to clinic today for Xgeva injection. Patient's labs monitored, specifically, Calcium level, to ensure no increased risk of hypocalcemia with administration of the medication. Patient's calcium level is 9.6 mg/dL. Patient received therapy and will continue to be monitored prior to each dose.     Mac gEan, [address] 9/25/2018 9:40 AM

## 2018-09-27 ENCOUNTER — HOSPITAL ENCOUNTER (OUTPATIENT)
Dept: INFUSION THERAPY | Age: 68
Discharge: HOME | End: 2018-09-27
Payer: MEDICARE

## 2018-09-27 DIAGNOSIS — C20 RECTAL CANCER METASTASIZED TO LIVER (HCC): ICD-10-CM

## 2018-09-27 DIAGNOSIS — C78.7 RECTAL CANCER METASTASIZED TO LIVER (HCC): ICD-10-CM

## 2018-09-27 PROCEDURE — 96372 THER/PROPH/DIAG INJ SC/IM: CPT

## 2018-09-27 PROCEDURE — 6360000002 HC RX W HCPCS: Performed by: INTERNAL MEDICINE

## 2018-09-27 RX ADMIN — PEGFILGRASTIM 6 MG: 6 INJECTION SUBCUTANEOUS at 15:26

## 2018-10-08 ENCOUNTER — HOSPITAL ENCOUNTER (OUTPATIENT)
Age: 68
Discharge: HOME | End: 2018-10-08
Payer: MEDICARE

## 2018-10-08 LAB
ALBUMIN SERPL-MCNC: 3.6 G/DL (ref 3.5–5.2)
ALP BLD-CCNC: 145 U/L (ref 40–129)
ALT SERPL-CCNC: 20 U/L (ref 0–40)
ANION GAP SERPL CALCULATED.3IONS-SCNC: 11 MMOL/L (ref 7–16)
AST SERPL-CCNC: 27 U/L (ref 0–39)
BASOPHILS ABSOLUTE: 0.04 E9/L (ref 0–0.2)
BASOPHILS RELATIVE PERCENT: 1.7 % (ref 0–2)
BILIRUB SERPL-MCNC: 0.8 MG/DL (ref 0–1.2)
BUN BLDV-MCNC: 13 MG/DL (ref 8–23)
CALCIUM SERPL-MCNC: 9.2 MG/DL (ref 8.6–10.2)
CEA: 6.7 NG/ML (ref 0–5.2)
CHLORIDE BLD-SCNC: 106 MMOL/L (ref 98–107)
CO2: 26 MMOL/L (ref 22–29)
CREAT SERPL-MCNC: 1 MG/DL (ref 0.7–1.2)
EOSINOPHILS ABSOLUTE: 0.04 E9/L (ref 0.05–0.5)
EOSINOPHILS RELATIVE PERCENT: 1.7 % (ref 0–6)
GFR AFRICAN AMERICAN: >60
GFR NON-AFRICAN AMERICAN: >60 ML/MIN/1.73
GLUCOSE BLD-MCNC: 122 MG/DL (ref 74–109)
HCT VFR BLD CALC: 29 % (ref 37–54)
HEMOGLOBIN: 9.2 G/DL (ref 12.5–16.5)
LYMPHOCYTES ABSOLUTE: 0.38 E9/L (ref 1.5–4)
LYMPHOCYTES RELATIVE PERCENT: 18.3 % (ref 20–42)
MCH RBC QN AUTO: 32.2 PG (ref 26–35)
MCHC RBC AUTO-ENTMCNC: 31.7 % (ref 32–34.5)
MCV RBC AUTO: 101.4 FL (ref 80–99.9)
MONOCYTES ABSOLUTE: 0.15 E9/L (ref 0.1–0.95)
MONOCYTES RELATIVE PERCENT: 7 % (ref 2–12)
MYELOCYTE PERCENT: 0.9 % (ref 0–0)
NEUTROPHILS ABSOLUTE: 1.49 E9/L (ref 1.8–7.3)
NEUTROPHILS RELATIVE PERCENT: 70.4 % (ref 43–80)
OVALOCYTES: ABNORMAL
PDW BLD-RTO: 16.5 FL (ref 11.5–15)
PLATELET # BLD: 101 E9/L (ref 130–450)
PMV BLD AUTO: 10.7 FL (ref 7–12)
POIKILOCYTES: ABNORMAL
POTASSIUM SERPL-SCNC: 3.8 MMOL/L (ref 3.5–5)
RBC # BLD: 2.86 E12/L (ref 3.8–5.8)
SODIUM BLD-SCNC: 143 MMOL/L (ref 132–146)
TEAR DROP CELLS: ABNORMAL
TOTAL PROTEIN: 6.4 G/DL (ref 6.4–8.3)
WBC # BLD: 2.1 E9/L (ref 4.5–11.5)

## 2018-10-08 PROCEDURE — 36415 COLL VENOUS BLD VENIPUNCTURE: CPT

## 2018-10-08 PROCEDURE — 82378 CARCINOEMBRYONIC ANTIGEN: CPT

## 2018-10-08 PROCEDURE — 85025 COMPLETE CBC W/AUTO DIFF WBC: CPT

## 2018-10-08 PROCEDURE — 80053 COMPREHEN METABOLIC PANEL: CPT

## 2018-10-09 ENCOUNTER — HOSPITAL ENCOUNTER (OUTPATIENT)
Dept: INFUSION THERAPY | Age: 68
Discharge: HOME | End: 2018-10-09
Payer: MEDICARE

## 2018-10-09 ENCOUNTER — OFFICE VISIT (OUTPATIENT)
Dept: ONCOLOGY | Age: 68
End: 2018-10-09
Payer: MEDICARE

## 2018-10-09 VITALS
RESPIRATION RATE: 20 BRPM | HEIGHT: 73 IN | WEIGHT: 193.5 LBS | TEMPERATURE: 97.5 F | DIASTOLIC BLOOD PRESSURE: 67 MMHG | SYSTOLIC BLOOD PRESSURE: 127 MMHG | BODY MASS INDEX: 25.64 KG/M2 | HEART RATE: 63 BPM

## 2018-10-09 VITALS — DIASTOLIC BLOOD PRESSURE: 66 MMHG | HEART RATE: 65 BPM | SYSTOLIC BLOOD PRESSURE: 122 MMHG | RESPIRATION RATE: 20 BRPM

## 2018-10-09 DIAGNOSIS — C20 RECTAL CANCER METASTASIZED TO LIVER (HCC): ICD-10-CM

## 2018-10-09 DIAGNOSIS — C20 RECTAL CANCER METASTASIZED TO LIVER (HCC): Primary | ICD-10-CM

## 2018-10-09 DIAGNOSIS — C78.7 RECTAL CANCER METASTASIZED TO LIVER (HCC): Primary | ICD-10-CM

## 2018-10-09 DIAGNOSIS — C78.7 RECTAL CANCER METASTASIZED TO LIVER (HCC): ICD-10-CM

## 2018-10-09 DIAGNOSIS — C20 RECTAL CANCER (HCC): Primary | ICD-10-CM

## 2018-10-09 PROCEDURE — 99214 OFFICE O/P EST MOD 30 MIN: CPT | Performed by: INTERNAL MEDICINE

## 2018-10-09 PROCEDURE — 96368 THER/DIAG CONCURRENT INF: CPT

## 2018-10-09 PROCEDURE — 2580000003 HC RX 258: Performed by: INTERNAL MEDICINE

## 2018-10-09 PROCEDURE — 96415 CHEMO IV INFUSION ADDL HR: CPT

## 2018-10-09 PROCEDURE — 96413 CHEMO IV INFUSION 1 HR: CPT

## 2018-10-09 PROCEDURE — 96366 THER/PROPH/DIAG IV INF ADDON: CPT

## 2018-10-09 PROCEDURE — 96417 CHEMO IV INFUS EACH ADDL SEQ: CPT

## 2018-10-09 PROCEDURE — 96375 TX/PRO/DX INJ NEW DRUG ADDON: CPT

## 2018-10-09 PROCEDURE — 6360000002 HC RX W HCPCS: Performed by: INTERNAL MEDICINE

## 2018-10-09 PROCEDURE — 96411 CHEMO IV PUSH ADDL DRUG: CPT

## 2018-10-09 RX ORDER — SODIUM CHLORIDE 0.9 % (FLUSH) 0.9 %
10 SYRINGE (ML) INJECTION PRN
Status: DISCONTINUED | OUTPATIENT
Start: 2018-10-09 | End: 2018-10-10 | Stop reason: HOSPADM

## 2018-10-09 RX ORDER — DEXAMETHASONE SODIUM PHOSPHATE 10 MG/ML
10 INJECTION, SOLUTION INTRAMUSCULAR; INTRAVENOUS ONCE
Status: CANCELLED | OUTPATIENT
Start: 2018-10-09

## 2018-10-09 RX ORDER — PALONOSETRON 0.05 MG/ML
0.25 INJECTION, SOLUTION INTRAVENOUS ONCE
Status: COMPLETED | OUTPATIENT
Start: 2018-10-09 | End: 2018-10-09

## 2018-10-09 RX ORDER — HEPARIN SODIUM (PORCINE) LOCK FLUSH IV SOLN 100 UNIT/ML 100 UNIT/ML
500 SOLUTION INTRAVENOUS PRN
Status: CANCELLED | OUTPATIENT
Start: 2018-10-09

## 2018-10-09 RX ORDER — FLUOROURACIL 50 MG/ML
850 INJECTION, SOLUTION INTRAVENOUS ONCE
Status: CANCELLED | OUTPATIENT
Start: 2018-10-09

## 2018-10-09 RX ORDER — DEXAMETHASONE SODIUM PHOSPHATE 100 MG/10ML
10 INJECTION INTRAMUSCULAR; INTRAVENOUS ONCE
Status: COMPLETED | OUTPATIENT
Start: 2018-10-09 | End: 2018-10-09

## 2018-10-09 RX ORDER — SODIUM CHLORIDE 9 MG/ML
INJECTION, SOLUTION INTRAVENOUS CONTINUOUS
Status: CANCELLED | OUTPATIENT
Start: 2018-10-09

## 2018-10-09 RX ORDER — METHYLPREDNISOLONE SODIUM SUCCINATE 125 MG/2ML
125 INJECTION, POWDER, LYOPHILIZED, FOR SOLUTION INTRAMUSCULAR; INTRAVENOUS ONCE
Status: CANCELLED | OUTPATIENT
Start: 2018-10-09 | End: 2018-10-09

## 2018-10-09 RX ORDER — SODIUM CHLORIDE 9 MG/ML
250 INJECTION, SOLUTION INTRAVENOUS CONTINUOUS
Status: CANCELLED | OUTPATIENT
Start: 2018-10-09 | End: 2018-10-26

## 2018-10-09 RX ORDER — SODIUM CHLORIDE 9 MG/ML
250 INJECTION, SOLUTION INTRAVENOUS CONTINUOUS
Status: DISCONTINUED | OUTPATIENT
Start: 2018-10-09 | End: 2018-10-10 | Stop reason: HOSPADM

## 2018-10-09 RX ORDER — ATROPINE SULFATE 0.4 MG/ML
0.4 AMPUL (ML) INJECTION ONCE
Status: CANCELLED | OUTPATIENT
Start: 2018-10-09 | End: 2018-10-09

## 2018-10-09 RX ORDER — SODIUM CHLORIDE 0.9 % (FLUSH) 0.9 %
10 SYRINGE (ML) INJECTION PRN
Status: CANCELLED | OUTPATIENT
Start: 2018-10-09

## 2018-10-09 RX ORDER — 0.9 % SODIUM CHLORIDE 0.9 %
10 VIAL (ML) INJECTION ONCE
Status: CANCELLED | OUTPATIENT
Start: 2018-10-09 | End: 2018-10-09

## 2018-10-09 RX ORDER — DIPHENHYDRAMINE HYDROCHLORIDE 50 MG/ML
50 INJECTION INTRAMUSCULAR; INTRAVENOUS ONCE
Status: CANCELLED | OUTPATIENT
Start: 2018-10-09 | End: 2018-10-09

## 2018-10-09 RX ORDER — EPINEPHRINE 1 MG/ML
0.3 INJECTION, SOLUTION, CONCENTRATE INTRAVENOUS PRN
Status: CANCELLED | OUTPATIENT
Start: 2018-10-09

## 2018-10-09 RX ORDER — PALONOSETRON 0.05 MG/ML
0.25 INJECTION, SOLUTION INTRAVENOUS ONCE
Status: CANCELLED | OUTPATIENT
Start: 2018-10-09

## 2018-10-09 RX ORDER — FLUOROURACIL 50 MG/ML
850 INJECTION, SOLUTION INTRAVENOUS ONCE
Status: COMPLETED | OUTPATIENT
Start: 2018-10-09 | End: 2018-10-09

## 2018-10-09 RX ORDER — HEPARIN SODIUM (PORCINE) LOCK FLUSH IV SOLN 100 UNIT/ML 100 UNIT/ML
500 SOLUTION INTRAVENOUS PRN
Status: DISCONTINUED | OUTPATIENT
Start: 2018-10-09 | End: 2018-10-10 | Stop reason: HOSPADM

## 2018-10-09 RX ADMIN — LEUCOVORIN CALCIUM 850 MG: 350 INJECTION, POWDER, LYOPHILIZED, FOR SOLUTION INTRAMUSCULAR; INTRAVENOUS at 09:54

## 2018-10-09 RX ADMIN — FLUOROURACIL 850 MG: 50 INJECTION, SOLUTION INTRAVENOUS at 11:49

## 2018-10-09 RX ADMIN — BEVACIZUMAB 400 MG: 400 INJECTION, SOLUTION INTRAVENOUS at 08:59

## 2018-10-09 RX ADMIN — DEXAMETHASONE SODIUM PHOSPHATE 10 MG: 10 INJECTION, SOLUTION INTRAMUSCULAR; INTRAVENOUS at 09:48

## 2018-10-09 RX ADMIN — SODIUM CHLORIDE 400 MG: 9 INJECTION, SOLUTION INTRAVENOUS at 09:54

## 2018-10-09 RX ADMIN — PALONOSETRON HYDROCHLORIDE 0.25 MG: 0.25 INJECTION INTRAVENOUS at 09:46

## 2018-10-09 RX ADMIN — Medication 10 ML: at 11:49

## 2018-10-09 RX ADMIN — SODIUM CHLORIDE 250 ML: 9 INJECTION, SOLUTION INTRAVENOUS at 08:59

## 2018-10-09 RX ADMIN — Medication 500 UNITS: at 11:49

## 2018-10-09 NOTE — PROGRESS NOTES
Ok to proceed with labs today WBC 2.1 and neutro 1.49 per Dr. Elian Coyne. Will continue to monitor.

## 2018-10-09 NOTE — PROGRESS NOTES
10/31/2017. Cycle # 34 FOLFIRI + Avastin was on 11/14/2017. CEA 7.2 on 11/14/2017. Cycle # 35 FOLFIRI + Avastin was on 11/28/2017. CEA 5.1 on 11/28/2017. Cycle # 36 FOLFIRI + Avastin was on 12/12/2017. CEA 6.1 on 12/12/2017. Bone scan 12/22/2017 noted no evidence of metastatic disease to the axial or appendicular skeleton. CT chest 12/22/2017 noted no evidence of metastatic disease. CT abdomen/pelvis 12/22/2017 noted stable hypodense lesions in the liver. Continue FOLFIRI + Avastin and repeat scans in 3 months. Cycle # 37 FOLFIRI + Avastin was on 12/27/2018. Cycle # 38 FOLFIRI + Avastin was on 01/09/2018. Cycle # 39 FOLFIRI + Avastin was on 01/23/2018. Cycle # 40 FOLFIRI + Avastin was on 02/06/2018. Cycle # 41 FOLFIRI + Avastin was on 02/20/2018. Cycle # 42 FOLFIRI + Avastin was on 03/06/2018. Cycle # 43 FOLFIRI + Avastin was on 03/20/2018. Bone scan on 03/26/2018 negative for metastatic disease. CT chest 03/26/2018 noted ? new 7 mm nodule in LUCY. CT abdomen/pelvis 03/26/2018 noted stable hypodense lesions in the liver. ? New small ascites in the abdomen. Patient wants to continue to monitor the lung finding and repeat scans in 2 months instead of changing the current regimen into oral Lonsurf. Cycle # 44 FOLFIRI + Avastin was on 04/03/2018. CEA 6.3 on 04/03/2018. Cycle # 45 FOLFIRI + Avastin was on 04/24/2018. CEA 5.3 on 04/24/2018. Cycle # 46 FOLFIRI + Avastin was on 05/08/2018. CEA 5.4 on 05/08/2018. Cycle # 47 FOLFIRI + Avastin was on 05/22/2018. CEA 6.1 on 05/22/2018. CT chest 05/31/2018 noted stable nodule in LUCY. No evidence of worsening malignancy. CT abdomen/pelvis on 05/31/2018 noted stable liver metastasis. No evidence of worsening malignancy. Continue FOLFIRI + Avastin and repeat scans in 3 months. Cycle # 48 FOLFIRI + Avastin was on 06/06/2018. Cycle # 49 FOLFIRI + Avastin was on 06/19/2018. Cycle # 50 FOLFIRI + Avastin was on 07/03/2018.   Cycle # 51 FOLFIRI + Avastin was on 07/24/2018. Cycle # 52 FOLFIRI + Avastin was on 08/14/2018. Cycle # 53 FOLFIRI + Avastin was on 08/28/2018. CT chest 09/06/2018 noted interval decrease in size of LUCY measuring up to 4 mm, suggestive of treatment response. No mediastinal or hilar LN  CT abdomen/pelvis 09/06/2018 Slight interval increase in size of a previously noted metastatic lesion within the right hepatic lobe. This may be related to differences in phase of enhancement compared to the most recent study from 5/31/2018, the lesion remains decreased in size compared to the more previous studies. No abdominal, retroperitoneal, or pelvic lymphadenopathy. Continue FOLFIRI + Avastin and repeat scans in 2 months. Cycle # 54 FOLFIRI + Avastin was on 09/11/2018. Cycle # 55 FOLFIRI + Avastin was on 09/25/2018. Cycle # 56 FOLFIRI + Avastin is 10/09/2018. Review of Systems;  CONSTITUTIONAL: No fever, chills. Fair appetite. Mild fatigue  ENMT: Eyes: no diplopia; Nose: No epistaxis. Mouth: No oral lesions. RESPIRATORY: No hemoptysis, shortness of breath, cough. CARDIOVASCULAR: No chest pain, palpitations. GASTROINTESTINAL: No nausea/vomiting, abdominal pain. GENITOURINARY: No dysuria, urinary frequency, hematuria. NEURO: No syncope, presyncope, headache. Remainder ROS: Negative. Past Medical History:   Past Medical History               Diagnosis Date    Arthritis      Cancer ([facility] [address]         colon    Depression      Hyperlipidemia      Hypertension           Medications:  Reviewed and reconciled.     Allergies: Allergies   Allergen Reactions    Neosporin [Neomycin-Polymyxin-Gramicidin] Rash    Tape Curlee Spitz Tape] Rash      Physical Exam:  /67 (Site: Left Upper Arm, Position: Sitting, Cuff Size: Medium Adult)   Pulse 63   Temp 97.5 °F (36.4 °C) (Temporal)   Resp 20   Ht 6' 1\" (1.854 m)   Wt 193 lb 8 oz (87.8 kg)   BMI 25.53 kg/m²   GENERAL: Alert, oriented x 3, not in acute distress.     HEENT: + Avastin was on 02/16/2016.  on 02/16/2016. Cycle # 8 of FOLFOX + Avastin was on 03/01/2016.  on 03/01/2016. Cycle # 9 of FOLFOX + Avastin was on 03/15/2016.  on 03/15/2016. Cycle # 10 of FOLFOX + Avastin was on 03/29/2016. .4 on 03/29/2016. Cycle # 11 of FOLFOX + Avastin was on 04/12/2016. .4 on 04/12/2016.     Re-staging scans on 04/19/2016: CT Chest: clear lungs; no evidence of recurring pulmonary nodule; CT Abdomen/Pelvis: Further interval decrease in size of the multiple metastatic hepatic lesions, the largest lesion now measures 3.9 x 3.5 cm and previously measured 4.5 cm in maximum diameter. No mesenteric lymphadenopathy is identified; Bone Scan: No evidence of osseous metastasis. Continue same regimen and re-stage in 2-3 months. Cycle # 12 FOLFOX + Avastin was on 04/26/2016. .5 on 04/26/2016. Cycle # 13 FOLFOX + Avastin was on 05/10/2016. .3 on 05/10/2016. Cycle # 14 FOLFOX+AVASTIN was on 05/24/2016. .5 on 05/24/2016. Cycle # 15 FOLFOX + Avastin was on 06/07/2016. CEA 90.5 on 06/07/2016. Admitted to [facility] 06/13/2016-06/16/2016 for abdominal pain: EGD noted 1.5 cm clean based duodenal bulb ulceration s/p epinephrine and bicap per Dr. Glenny Martines. No active bleeding. A. Stomach, biopsy: Mild chronic gastritis, immunostain negative for Helicobacter  B. Esophagus, biopsy: Gastric glandular mucosa with prominent ntestinal metaplasia (Mad[place]'s epithelium), negative for epithelial dysplasia, esophageal squamous mucosa not identified     Cycle # 16 FOLFOX (discontinued avastin (bevacizumab) given association of peptic ulcer disease and known association of GI perforation.) was on 06/21/2016. CEA 47 on 06/21/2016. Cycle # 17 FOLFOX was on 07/05/2016. CEA 52.6 on 07/05/2016. CEA 47.6 on 07/19/2016.     Re-staging scans 07/19/2106: CT Chest negative for metastatic disease.    CT Abdomen/Pelvis: Stable hepatic lesions; Question new mural thickening in the

## 2018-10-11 ENCOUNTER — HOSPITAL ENCOUNTER (OUTPATIENT)
Dept: INFUSION THERAPY | Age: 68
Discharge: HOME | End: 2018-10-11
Payer: MEDICARE

## 2018-10-11 DIAGNOSIS — C78.7 RECTAL CANCER METASTASIZED TO LIVER (HCC): ICD-10-CM

## 2018-10-11 DIAGNOSIS — C20 RECTAL CANCER METASTASIZED TO LIVER (HCC): ICD-10-CM

## 2018-10-11 PROCEDURE — 96372 THER/PROPH/DIAG INJ SC/IM: CPT

## 2018-10-11 PROCEDURE — 6360000002 HC RX W HCPCS: Performed by: INTERNAL MEDICINE

## 2018-10-11 RX ADMIN — PEGFILGRASTIM 6 MG: 6 INJECTION SUBCUTANEOUS at 13:04

## 2018-10-16 DIAGNOSIS — C78.7 RECTAL CANCER METASTASIZED TO LIVER (HCC): Primary | ICD-10-CM

## 2018-10-16 DIAGNOSIS — C20 RECTAL CANCER METASTASIZED TO LIVER (HCC): Primary | ICD-10-CM

## 2018-10-22 ENCOUNTER — HOSPITAL ENCOUNTER (OUTPATIENT)
Age: 68
Discharge: HOME | End: 2018-10-22
Payer: MEDICARE

## 2018-10-22 DIAGNOSIS — C20 RECTAL CANCER METASTASIZED TO LIVER (HCC): ICD-10-CM

## 2018-10-22 DIAGNOSIS — C78.7 RECTAL CANCER METASTASIZED TO LIVER (HCC): ICD-10-CM

## 2018-10-22 LAB
ALBUMIN SERPL-MCNC: 3.6 G/DL (ref 3.5–5.2)
ALP BLD-CCNC: 144 U/L (ref 40–129)
ALT SERPL-CCNC: 21 U/L (ref 0–40)
ANION GAP SERPL CALCULATED.3IONS-SCNC: 10 MMOL/L (ref 7–16)
ANISOCYTOSIS: ABNORMAL
AST SERPL-CCNC: 26 U/L (ref 0–39)
BASOPHILS ABSOLUTE: 0.03 E9/L (ref 0–0.2)
BASOPHILS RELATIVE PERCENT: 0.9 % (ref 0–2)
BILIRUB SERPL-MCNC: 0.8 MG/DL (ref 0–1.2)
BUN BLDV-MCNC: 9 MG/DL (ref 8–23)
CALCIUM SERPL-MCNC: 7.5 MG/DL (ref 8.6–10.2)
CEA: 7.2 NG/ML (ref 0–5.2)
CHLORIDE BLD-SCNC: 110 MMOL/L (ref 98–107)
CO2: 26 MMOL/L (ref 22–29)
CREAT SERPL-MCNC: 0.8 MG/DL (ref 0.7–1.2)
EOSINOPHILS ABSOLUTE: 0.08 E9/L (ref 0.05–0.5)
EOSINOPHILS RELATIVE PERCENT: 2.6 % (ref 0–6)
GFR AFRICAN AMERICAN: >60
GFR NON-AFRICAN AMERICAN: >60 ML/MIN/1.73
GLUCOSE BLD-MCNC: 106 MG/DL (ref 74–109)
HCT VFR BLD CALC: 28.6 % (ref 37–54)
HEMOGLOBIN: 9.2 G/DL (ref 12.5–16.5)
LYMPHOCYTES ABSOLUTE: 0.75 E9/L (ref 1.5–4)
LYMPHOCYTES RELATIVE PERCENT: 26.1 % (ref 20–42)
MCH RBC QN AUTO: 32.6 PG (ref 26–35)
MCHC RBC AUTO-ENTMCNC: 32.2 % (ref 32–34.5)
MCV RBC AUTO: 101.4 FL (ref 80–99.9)
MONOCYTES ABSOLUTE: 0.14 E9/L (ref 0.1–0.95)
MONOCYTES RELATIVE PERCENT: 5.2 % (ref 2–12)
MYELOCYTE PERCENT: 0.9 % (ref 0–0)
NEUTROPHILS ABSOLUTE: 1.89 E9/L (ref 1.8–7.3)
NEUTROPHILS RELATIVE PERCENT: 64.3 % (ref 43–80)
OVALOCYTES: ABNORMAL
PDW BLD-RTO: 17.2 FL (ref 11.5–15)
PLATELET # BLD: 98 E9/L (ref 130–450)
PLATELET CONFIRMATION: NORMAL
PMV BLD AUTO: 10.8 FL (ref 7–12)
POIKILOCYTES: ABNORMAL
POLYCHROMASIA: ABNORMAL
POTASSIUM SERPL-SCNC: 3.4 MMOL/L (ref 3.5–5)
RBC # BLD: 2.82 E12/L (ref 3.8–5.8)
SODIUM BLD-SCNC: 146 MMOL/L (ref 132–146)
TEAR DROP CELLS: ABNORMAL
TOTAL PROTEIN: 6.1 G/DL (ref 6.4–8.3)
WBC # BLD: 2.9 E9/L (ref 4.5–11.5)

## 2018-10-22 PROCEDURE — 82378 CARCINOEMBRYONIC ANTIGEN: CPT

## 2018-10-22 PROCEDURE — 80053 COMPREHEN METABOLIC PANEL: CPT

## 2018-10-22 PROCEDURE — 36415 COLL VENOUS BLD VENIPUNCTURE: CPT

## 2018-10-22 PROCEDURE — 85025 COMPLETE CBC W/AUTO DIFF WBC: CPT

## 2018-10-23 ENCOUNTER — HOSPITAL ENCOUNTER (OUTPATIENT)
Dept: INFUSION THERAPY | Age: 68
Discharge: HOME | End: 2018-10-23
Payer: MEDICARE

## 2018-10-23 ENCOUNTER — OFFICE VISIT (OUTPATIENT)
Dept: ONCOLOGY | Age: 68
End: 2018-10-23
Payer: MEDICARE

## 2018-10-23 VITALS — HEART RATE: 67 BPM | RESPIRATION RATE: 20 BRPM | DIASTOLIC BLOOD PRESSURE: 69 MMHG | SYSTOLIC BLOOD PRESSURE: 125 MMHG

## 2018-10-23 VITALS
SYSTOLIC BLOOD PRESSURE: 119 MMHG | DIASTOLIC BLOOD PRESSURE: 65 MMHG | RESPIRATION RATE: 20 BRPM | HEART RATE: 68 BPM | TEMPERATURE: 98.6 F | BODY MASS INDEX: 26.17 KG/M2 | HEIGHT: 73 IN | WEIGHT: 197.5 LBS

## 2018-10-23 DIAGNOSIS — C78.7 RECTAL CANCER METASTASIZED TO LIVER (HCC): ICD-10-CM

## 2018-10-23 DIAGNOSIS — C20 RECTAL CANCER METASTASIZED TO LIVER (HCC): ICD-10-CM

## 2018-10-23 DIAGNOSIS — C78.7 RECTAL CANCER METASTASIZED TO LIVER (HCC): Primary | ICD-10-CM

## 2018-10-23 DIAGNOSIS — C20 RECTAL CANCER METASTASIZED TO LIVER (HCC): Primary | ICD-10-CM

## 2018-10-23 PROCEDURE — 2580000003 HC RX 258: Performed by: INTERNAL MEDICINE

## 2018-10-23 PROCEDURE — 6360000002 HC RX W HCPCS: Performed by: INTERNAL MEDICINE

## 2018-10-23 PROCEDURE — 96411 CHEMO IV PUSH ADDL DRUG: CPT

## 2018-10-23 PROCEDURE — 96366 THER/PROPH/DIAG IV INF ADDON: CPT

## 2018-10-23 PROCEDURE — 96417 CHEMO IV INFUS EACH ADDL SEQ: CPT

## 2018-10-23 PROCEDURE — 96413 CHEMO IV INFUSION 1 HR: CPT

## 2018-10-23 PROCEDURE — 99214 OFFICE O/P EST MOD 30 MIN: CPT | Performed by: INTERNAL MEDICINE

## 2018-10-23 PROCEDURE — 96375 TX/PRO/DX INJ NEW DRUG ADDON: CPT

## 2018-10-23 PROCEDURE — 96415 CHEMO IV INFUSION ADDL HR: CPT

## 2018-10-23 PROCEDURE — 96368 THER/DIAG CONCURRENT INF: CPT

## 2018-10-23 RX ORDER — SODIUM CHLORIDE 0.9 % (FLUSH) 0.9 %
10 SYRINGE (ML) INJECTION PRN
Status: DISCONTINUED | OUTPATIENT
Start: 2018-10-23 | End: 2018-10-24 | Stop reason: HOSPADM

## 2018-10-23 RX ORDER — SODIUM CHLORIDE 9 MG/ML
250 INJECTION, SOLUTION INTRAVENOUS CONTINUOUS
Status: DISCONTINUED | OUTPATIENT
Start: 2018-10-23 | End: 2018-10-24 | Stop reason: HOSPADM

## 2018-10-23 RX ORDER — SODIUM CHLORIDE 9 MG/ML
250 INJECTION, SOLUTION INTRAVENOUS CONTINUOUS
Status: CANCELLED | OUTPATIENT
Start: 2018-10-23 | End: 2018-11-10

## 2018-10-23 RX ORDER — DEXAMETHASONE SODIUM PHOSPHATE 10 MG/ML
10 INJECTION, SOLUTION INTRAMUSCULAR; INTRAVENOUS ONCE
Status: COMPLETED | OUTPATIENT
Start: 2018-10-23 | End: 2018-10-23

## 2018-10-23 RX ORDER — HEPARIN SODIUM (PORCINE) LOCK FLUSH IV SOLN 100 UNIT/ML 100 UNIT/ML
500 SOLUTION INTRAVENOUS PRN
Status: DISCONTINUED | OUTPATIENT
Start: 2018-10-23 | End: 2018-10-24 | Stop reason: HOSPADM

## 2018-10-23 RX ORDER — EPINEPHRINE 1 MG/ML
0.3 INJECTION, SOLUTION, CONCENTRATE INTRAVENOUS PRN
Status: CANCELLED | OUTPATIENT
Start: 2018-10-23

## 2018-10-23 RX ORDER — HEPARIN SODIUM (PORCINE) LOCK FLUSH IV SOLN 100 UNIT/ML 100 UNIT/ML
500 SOLUTION INTRAVENOUS PRN
Status: CANCELLED | OUTPATIENT
Start: 2018-10-23

## 2018-10-23 RX ORDER — PALONOSETRON 0.05 MG/ML
0.25 INJECTION, SOLUTION INTRAVENOUS ONCE
Status: COMPLETED | OUTPATIENT
Start: 2018-10-23 | End: 2018-10-23

## 2018-10-23 RX ORDER — DEXAMETHASONE SODIUM PHOSPHATE 10 MG/ML
10 INJECTION, SOLUTION INTRAMUSCULAR; INTRAVENOUS ONCE
Status: CANCELLED | OUTPATIENT
Start: 2018-10-23

## 2018-10-23 RX ORDER — ATROPINE SULFATE 0.4 MG/ML
0.4 AMPUL (ML) INJECTION ONCE
Status: COMPLETED | OUTPATIENT
Start: 2018-10-23 | End: 2018-10-23

## 2018-10-23 RX ORDER — FLUOROURACIL 50 MG/ML
850 INJECTION, SOLUTION INTRAVENOUS ONCE
Status: COMPLETED | OUTPATIENT
Start: 2018-10-23 | End: 2018-10-23

## 2018-10-23 RX ORDER — SODIUM CHLORIDE 0.9 % (FLUSH) 0.9 %
10 SYRINGE (ML) INJECTION PRN
Status: CANCELLED | OUTPATIENT
Start: 2018-10-23

## 2018-10-23 RX ORDER — DIPHENHYDRAMINE HYDROCHLORIDE 50 MG/ML
50 INJECTION INTRAMUSCULAR; INTRAVENOUS ONCE
Status: CANCELLED | OUTPATIENT
Start: 2018-10-23 | End: 2018-10-23

## 2018-10-23 RX ORDER — PALONOSETRON 0.05 MG/ML
0.25 INJECTION, SOLUTION INTRAVENOUS ONCE
Status: CANCELLED | OUTPATIENT
Start: 2018-10-23

## 2018-10-23 RX ORDER — FLUOROURACIL 50 MG/ML
850 INJECTION, SOLUTION INTRAVENOUS ONCE
Status: CANCELLED | OUTPATIENT
Start: 2018-10-23

## 2018-10-23 RX ORDER — ATROPINE SULFATE 0.4 MG/ML
0.4 AMPUL (ML) INJECTION ONCE
Status: CANCELLED | OUTPATIENT
Start: 2018-10-23 | End: 2018-10-23

## 2018-10-23 RX ORDER — 0.9 % SODIUM CHLORIDE 0.9 %
10 VIAL (ML) INJECTION ONCE
Status: CANCELLED | OUTPATIENT
Start: 2018-10-23 | End: 2018-10-23

## 2018-10-23 RX ORDER — METHYLPREDNISOLONE SODIUM SUCCINATE 125 MG/2ML
125 INJECTION, POWDER, LYOPHILIZED, FOR SOLUTION INTRAMUSCULAR; INTRAVENOUS ONCE
Status: CANCELLED | OUTPATIENT
Start: 2018-10-23 | End: 2018-10-23

## 2018-10-23 RX ORDER — SODIUM CHLORIDE 9 MG/ML
INJECTION, SOLUTION INTRAVENOUS CONTINUOUS
Status: CANCELLED | OUTPATIENT
Start: 2018-10-23

## 2018-10-23 RX ADMIN — DEXAMETHASONE SODIUM PHOSPHATE 10 MG: 10 INJECTION INTRAMUSCULAR; INTRAVENOUS at 10:00

## 2018-10-23 RX ADMIN — ATROPINE SULFATE 0.4 MG: 0.4 INJECTION INTRAMUSCULAR; INTRAVENOUS; SUBCUTANEOUS at 09:57

## 2018-10-23 RX ADMIN — PALONOSETRON HYDROCHLORIDE 0.25 MG: 0.25 INJECTION INTRAVENOUS at 09:55

## 2018-10-23 RX ADMIN — FLUOROURACIL 850 MG: 50 INJECTION, SOLUTION INTRAVENOUS at 12:16

## 2018-10-23 RX ADMIN — Medication 500 UNITS: at 12:23

## 2018-10-23 RX ADMIN — SODIUM CHLORIDE 400 MG: 9 INJECTION, SOLUTION INTRAVENOUS at 10:18

## 2018-10-23 RX ADMIN — SODIUM CHLORIDE 250 ML: 9 INJECTION, SOLUTION INTRAVENOUS at 09:08

## 2018-10-23 RX ADMIN — BEVACIZUMAB 400 MG: 400 INJECTION, SOLUTION INTRAVENOUS at 09:10

## 2018-10-23 RX ADMIN — Medication 10 ML: at 12:23

## 2018-10-23 RX ADMIN — LEUCOVORIN CALCIUM 850 MG: 350 INJECTION, POWDER, LYOPHILIZED, FOR SOLUTION INTRAMUSCULAR; INTRAVENOUS at 10:16

## 2018-10-23 NOTE — PROGRESS NOTES
02/02/2016. Cycle # 7 of FOLFOX + Avastin was on 02/16/2016.  on 02/16/2016. Cycle # 8 of FOLFOX + Avastin was on 03/01/2016.  on 03/01/2016. Cycle # 9 of FOLFOX + Avastin was on 03/15/2016.  on 03/15/2016. Cycle # 10 of FOLFOX + Avastin was on 03/29/2016. .4 on 03/29/2016. Cycle # 11 of FOLFOX + Avastin was on 04/12/2016. .4 on 04/12/2016.     Re-staging scans on 04/19/2016: CT Chest: clear lungs; no evidence of recurring pulmonary nodule; CT Abdomen/Pelvis: Further interval decrease in size of the multiple metastatic hepatic lesions, the largest lesion now measures 3.9 x 3.5 cm and previously measured 4.5 cm in maximum diameter. No mesenteric lymphadenopathy is identified; Bone Scan: No evidence of osseous metastasis. Continue same regimen and re-stage in 2-3 months. Cycle # 12 FOLFOX + Avastin was on 04/26/2016. .5 on 04/26/2016. Cycle # 13 FOLFOX + Avastin was on 05/10/2016. .3 on 05/10/2016. Cycle # 14 FOLFOX+AVASTIN was on 05/24/2016. .5 on 05/24/2016. Cycle # 15 FOLFOX + Avastin was on 06/07/2016. CEA 90.5 on 06/07/2016. Admitted to [facility] 06/13/2016-06/16/2016 for abdominal pain: EGD noted 1.5 cm clean based duodenal bulb ulceration s/p epinephrine and bicap per Dr. Franci Van. No active bleeding. A. Stomach, biopsy: Mild chronic gastritis, immunostain negative for Helicobacter  B. Esophagus, biopsy: Gastric glandular mucosa with prominent ntestinal metaplasia (Madrid's epithelium), negative for epithelial dysplasia, esophageal squamous mucosa not identified     Cycle # 16 FOLFOX (discontinued avastin (bevacizumab) given association of peptic ulcer disease and known association of GI perforation.) was on 06/21/2016. CEA 47 on 06/21/2016. Cycle # 17 FOLFOX was on 07/05/2016. CEA 52.6 on 07/05/2016. CEA 47.6 on 07/19/2016.     Re-staging scans 07/19/2106: CT Chest negative for metastatic disease.    CT Abdomen/Pelvis: Stable hepatic lesions;

## 2018-10-23 NOTE — PROGRESS NOTES
Ok to proceed with chemo with labs from today per Joseph mckeon Rn with instruction from [address]. Also aware of Ca 7.5.

## 2018-10-23 NOTE — PROGRESS NOTES
Patient presents to clinic today for Xgeva injection. Patient's labs monitored, specifically, Calcium level, to ensure no increased risk of hypocalcemia with administration of the medication. Patient's calcium level is 7.5 mg/dL. Therapy to be held today and will recheck labs in 2 weeks when he returns for his next chemotherapy treatment.     [place] Lovell, [address] 10/23/2018 8:48 AM

## 2018-10-25 ENCOUNTER — HOSPITAL ENCOUNTER (OUTPATIENT)
Dept: INFUSION THERAPY | Age: 68
Discharge: HOME | End: 2018-10-25
Payer: MEDICARE

## 2018-10-25 DIAGNOSIS — C20 RECTAL CANCER METASTASIZED TO LIVER (HCC): ICD-10-CM

## 2018-10-25 DIAGNOSIS — C78.7 RECTAL CANCER METASTASIZED TO LIVER (HCC): Primary | ICD-10-CM

## 2018-10-25 DIAGNOSIS — C78.7 RECTAL CANCER METASTASIZED TO LIVER (HCC): ICD-10-CM

## 2018-10-25 DIAGNOSIS — C79.51 BONE METASTASIS (HCC): ICD-10-CM

## 2018-10-25 DIAGNOSIS — C20 RECTAL CANCER METASTASIZED TO LIVER (HCC): Primary | ICD-10-CM

## 2018-10-25 PROCEDURE — 6360000002 HC RX W HCPCS: Performed by: INTERNAL MEDICINE

## 2018-10-25 PROCEDURE — 96372 THER/PROPH/DIAG INJ SC/IM: CPT

## 2018-10-25 RX ADMIN — PEGFILGRASTIM 6 MG: 6 INJECTION SUBCUTANEOUS at 13:59

## 2018-11-05 ENCOUNTER — HOSPITAL ENCOUNTER (OUTPATIENT)
Age: 68
Discharge: HOME | End: 2018-11-05
Payer: MEDICARE

## 2018-11-05 DIAGNOSIS — C20 RECTAL CANCER METASTASIZED TO LIVER (HCC): ICD-10-CM

## 2018-11-05 DIAGNOSIS — C79.51 BONE METASTASIS (HCC): ICD-10-CM

## 2018-11-05 DIAGNOSIS — C78.7 RECTAL CANCER METASTASIZED TO LIVER (HCC): ICD-10-CM

## 2018-11-05 LAB
ALBUMIN SERPL-MCNC: 3.7 G/DL (ref 3.5–5.2)
ALP BLD-CCNC: 140 U/L (ref 40–129)
ALT SERPL-CCNC: 20 U/L (ref 0–40)
ANION GAP SERPL CALCULATED.3IONS-SCNC: 10 MMOL/L (ref 7–16)
ANISOCYTOSIS: ABNORMAL
AST SERPL-CCNC: 27 U/L (ref 0–39)
BASOPHILS ABSOLUTE: 0.03 E9/L (ref 0–0.2)
BASOPHILS RELATIVE PERCENT: 1.3 % (ref 0–2)
BILIRUB SERPL-MCNC: 0.7 MG/DL (ref 0–1.2)
BUN BLDV-MCNC: 10 MG/DL (ref 8–23)
CALCIUM SERPL-MCNC: 8.8 MG/DL (ref 8.6–10.2)
CEA: 7.6 NG/ML (ref 0–5.2)
CHLORIDE BLD-SCNC: 105 MMOL/L (ref 98–107)
CO2: 27 MMOL/L (ref 22–29)
CREAT SERPL-MCNC: 1 MG/DL (ref 0.7–1.2)
EOSINOPHILS ABSOLUTE: 0.11 E9/L (ref 0.05–0.5)
EOSINOPHILS RELATIVE PERCENT: 4.9 % (ref 0–6)
GFR AFRICAN AMERICAN: >60
GFR NON-AFRICAN AMERICAN: >60 ML/MIN/1.73
GLUCOSE BLD-MCNC: 115 MG/DL (ref 74–99)
HCT VFR BLD CALC: 28.4 % (ref 37–54)
HEMOGLOBIN: 8.9 G/DL (ref 12.5–16.5)
IMMATURE GRANULOCYTES #: 0.02 E9/L
IMMATURE GRANULOCYTES %: 0.9 % (ref 0–5)
LYMPHOCYTES ABSOLUTE: 0.49 E9/L (ref 1.5–4)
LYMPHOCYTES RELATIVE PERCENT: 21.9 % (ref 20–42)
MCH RBC QN AUTO: 32.4 PG (ref 26–35)
MCHC RBC AUTO-ENTMCNC: 31.3 % (ref 32–34.5)
MCV RBC AUTO: 103.3 FL (ref 80–99.9)
MONOCYTES ABSOLUTE: 0.21 E9/L (ref 0.1–0.95)
MONOCYTES RELATIVE PERCENT: 9.4 % (ref 2–12)
NEUTROPHILS ABSOLUTE: 1.38 E9/L (ref 1.8–7.3)
NEUTROPHILS RELATIVE PERCENT: 61.6 % (ref 43–80)
OVALOCYTES: ABNORMAL
PDW BLD-RTO: 17.5 FL (ref 11.5–15)
PLATELET # BLD: 108 E9/L (ref 130–450)
PMV BLD AUTO: 11 FL (ref 7–12)
POIKILOCYTES: ABNORMAL
POLYCHROMASIA: ABNORMAL
POTASSIUM SERPL-SCNC: 4.1 MMOL/L (ref 3.5–5)
RBC # BLD: 2.75 E12/L (ref 3.8–5.8)
SODIUM BLD-SCNC: 142 MMOL/L (ref 132–146)
TEAR DROP CELLS: ABNORMAL
TOTAL PROTEIN: 6.5 G/DL (ref 6.4–8.3)
WBC # BLD: 2.2 E9/L (ref 4.5–11.5)

## 2018-11-05 PROCEDURE — 85025 COMPLETE CBC W/AUTO DIFF WBC: CPT

## 2018-11-05 PROCEDURE — 80053 COMPREHEN METABOLIC PANEL: CPT

## 2018-11-05 PROCEDURE — 82378 CARCINOEMBRYONIC ANTIGEN: CPT

## 2018-11-05 PROCEDURE — 36415 COLL VENOUS BLD VENIPUNCTURE: CPT

## 2018-11-06 ENCOUNTER — OFFICE VISIT (OUTPATIENT)
Dept: ONCOLOGY | Age: 68
End: 2018-11-06
Payer: MEDICARE

## 2018-11-06 ENCOUNTER — HOSPITAL ENCOUNTER (OUTPATIENT)
Dept: INFUSION THERAPY | Age: 68
Discharge: HOME | End: 2018-11-06
Payer: MEDICARE

## 2018-11-06 VITALS
WEIGHT: 193.3 LBS | HEART RATE: 65 BPM | TEMPERATURE: 98 F | SYSTOLIC BLOOD PRESSURE: 124 MMHG | DIASTOLIC BLOOD PRESSURE: 63 MMHG | RESPIRATION RATE: 20 BRPM | BODY MASS INDEX: 25.62 KG/M2 | HEIGHT: 73 IN

## 2018-11-06 DIAGNOSIS — C78.7 RECTAL CANCER METASTASIZED TO LIVER (HCC): ICD-10-CM

## 2018-11-06 DIAGNOSIS — C20 RECTAL CANCER METASTASIZED TO LIVER (HCC): ICD-10-CM

## 2018-11-06 DIAGNOSIS — C20 RECTAL CANCER (HCC): Primary | ICD-10-CM

## 2018-11-06 DIAGNOSIS — C79.51 BONE METASTASIS (HCC): ICD-10-CM

## 2018-11-06 PROCEDURE — 99214 OFFICE O/P EST MOD 30 MIN: CPT | Performed by: INTERNAL MEDICINE

## 2018-11-06 PROCEDURE — 2580000003 HC RX 258: Performed by: INTERNAL MEDICINE

## 2018-11-06 PROCEDURE — 96417 CHEMO IV INFUS EACH ADDL SEQ: CPT

## 2018-11-06 PROCEDURE — 96415 CHEMO IV INFUSION ADDL HR: CPT

## 2018-11-06 PROCEDURE — 96372 THER/PROPH/DIAG INJ SC/IM: CPT

## 2018-11-06 PROCEDURE — 96375 TX/PRO/DX INJ NEW DRUG ADDON: CPT

## 2018-11-06 PROCEDURE — 6360000002 HC RX W HCPCS: Performed by: INTERNAL MEDICINE

## 2018-11-06 PROCEDURE — 96368 THER/DIAG CONCURRENT INF: CPT

## 2018-11-06 PROCEDURE — 96411 CHEMO IV PUSH ADDL DRUG: CPT

## 2018-11-06 PROCEDURE — 96413 CHEMO IV INFUSION 1 HR: CPT

## 2018-11-06 RX ORDER — ATROPINE SULFATE 0.4 MG/ML
0.4 AMPUL (ML) INJECTION ONCE
Status: COMPLETED | OUTPATIENT
Start: 2018-11-06 | End: 2018-11-06

## 2018-11-06 RX ORDER — METHYLPREDNISOLONE SODIUM SUCCINATE 125 MG/2ML
125 INJECTION, POWDER, LYOPHILIZED, FOR SOLUTION INTRAMUSCULAR; INTRAVENOUS ONCE
Status: CANCELLED | OUTPATIENT
Start: 2018-11-06 | End: 2018-11-06

## 2018-11-06 RX ORDER — DEXAMETHASONE SODIUM PHOSPHATE 10 MG/ML
10 INJECTION, SOLUTION INTRAMUSCULAR; INTRAVENOUS ONCE
Status: CANCELLED | OUTPATIENT
Start: 2018-11-06

## 2018-11-06 RX ORDER — FLUOROURACIL 50 MG/ML
850 INJECTION, SOLUTION INTRAVENOUS ONCE
Status: CANCELLED | OUTPATIENT
Start: 2018-11-06

## 2018-11-06 RX ORDER — SODIUM CHLORIDE 9 MG/ML
INJECTION, SOLUTION INTRAVENOUS CONTINUOUS
Status: CANCELLED | OUTPATIENT
Start: 2018-11-06

## 2018-11-06 RX ORDER — SODIUM CHLORIDE 9 MG/ML
250 INJECTION, SOLUTION INTRAVENOUS CONTINUOUS
Status: CANCELLED | OUTPATIENT
Start: 2018-11-06 | End: 2018-11-24

## 2018-11-06 RX ORDER — ATROPINE SULFATE 0.4 MG/ML
0.4 AMPUL (ML) INJECTION ONCE
Status: CANCELLED | OUTPATIENT
Start: 2018-11-06 | End: 2018-11-06

## 2018-11-06 RX ORDER — DEXAMETHASONE SODIUM PHOSPHATE 10 MG/ML
10 INJECTION, SOLUTION INTRAMUSCULAR; INTRAVENOUS ONCE
Status: COMPLETED | OUTPATIENT
Start: 2018-11-06 | End: 2018-11-06

## 2018-11-06 RX ORDER — PALONOSETRON 0.05 MG/ML
0.25 INJECTION, SOLUTION INTRAVENOUS ONCE
Status: CANCELLED | OUTPATIENT
Start: 2018-11-06

## 2018-11-06 RX ORDER — DIPHENHYDRAMINE HYDROCHLORIDE 50 MG/ML
50 INJECTION INTRAMUSCULAR; INTRAVENOUS ONCE
Status: CANCELLED | OUTPATIENT
Start: 2018-11-06 | End: 2018-11-06

## 2018-11-06 RX ORDER — 0.9 % SODIUM CHLORIDE 0.9 %
10 VIAL (ML) INJECTION ONCE
Status: CANCELLED | OUTPATIENT
Start: 2018-11-06 | End: 2018-11-06

## 2018-11-06 RX ORDER — SODIUM CHLORIDE 0.9 % (FLUSH) 0.9 %
10 SYRINGE (ML) INJECTION PRN
Status: CANCELLED | OUTPATIENT
Start: 2018-11-06

## 2018-11-06 RX ORDER — FLUOROURACIL 50 MG/ML
850 INJECTION, SOLUTION INTRAVENOUS ONCE
Status: COMPLETED | OUTPATIENT
Start: 2018-11-06 | End: 2018-11-06

## 2018-11-06 RX ORDER — HEPARIN SODIUM (PORCINE) LOCK FLUSH IV SOLN 100 UNIT/ML 100 UNIT/ML
500 SOLUTION INTRAVENOUS PRN
Status: CANCELLED | OUTPATIENT
Start: 2018-11-06

## 2018-11-06 RX ORDER — SODIUM CHLORIDE 9 MG/ML
250 INJECTION, SOLUTION INTRAVENOUS CONTINUOUS
Status: DISCONTINUED | OUTPATIENT
Start: 2018-11-06 | End: 2018-11-07 | Stop reason: HOSPADM

## 2018-11-06 RX ORDER — HEPARIN SODIUM (PORCINE) LOCK FLUSH IV SOLN 100 UNIT/ML 100 UNIT/ML
500 SOLUTION INTRAVENOUS PRN
Status: DISCONTINUED | OUTPATIENT
Start: 2018-11-06 | End: 2018-11-07 | Stop reason: HOSPADM

## 2018-11-06 RX ORDER — SODIUM CHLORIDE 0.9 % (FLUSH) 0.9 %
10 SYRINGE (ML) INJECTION PRN
Status: DISCONTINUED | OUTPATIENT
Start: 2018-11-06 | End: 2018-11-07 | Stop reason: HOSPADM

## 2018-11-06 RX ORDER — PALONOSETRON 0.05 MG/ML
0.25 INJECTION, SOLUTION INTRAVENOUS ONCE
Status: COMPLETED | OUTPATIENT
Start: 2018-11-06 | End: 2018-11-06

## 2018-11-06 RX ORDER — EPINEPHRINE 1 MG/ML
0.3 INJECTION, SOLUTION, CONCENTRATE INTRAVENOUS PRN
Status: CANCELLED | OUTPATIENT
Start: 2018-11-06

## 2018-11-06 RX ADMIN — PALONOSETRON HYDROCHLORIDE 0.25 MG: 0.25 INJECTION INTRAVENOUS at 09:12

## 2018-11-06 RX ADMIN — SODIUM CHLORIDE 250 ML: 9 INJECTION, SOLUTION INTRAVENOUS at 09:12

## 2018-11-06 RX ADMIN — HEPARIN SODIUM (PORCINE) LOCK FLUSH IV SOLN 100 UNIT/ML 500 UNITS: 100 SOLUTION at 12:37

## 2018-11-06 RX ADMIN — FLUOROURACIL 850 MG: 50 INJECTION, SOLUTION INTRAVENOUS at 12:12

## 2018-11-06 RX ADMIN — BEVACIZUMAB 400 MG: 400 INJECTION, SOLUTION INTRAVENOUS at 09:31

## 2018-11-06 RX ADMIN — SODIUM CHLORIDE 400 MG: 9 INJECTION, SOLUTION INTRAVENOUS at 10:13

## 2018-11-06 RX ADMIN — ATROPINE SULFATE 0.4 MG: 0.4 INJECTION INTRAMUSCULAR; INTRAVENOUS; SUBCUTANEOUS at 10:12

## 2018-11-06 RX ADMIN — DENOSUMAB 120 MG: 120 INJECTION SUBCUTANEOUS at 12:33

## 2018-11-06 RX ADMIN — DEXAMETHASONE SODIUM PHOSPHATE 10 MG: 10 INJECTION, SOLUTION INTRAMUSCULAR; INTRAVENOUS at 09:12

## 2018-11-06 RX ADMIN — LEUCOVORIN CALCIUM 850 MG: 350 INJECTION, POWDER, LYOPHILIZED, FOR SOLUTION INTRAMUSCULAR; INTRAVENOUS at 10:12

## 2018-11-06 RX ADMIN — Medication 10 ML: at 12:37

## 2018-11-06 NOTE — PROGRESS NOTES
3488. Bone scan on 11/10/2015 noted no metastatic disease. CT chest on 11/10/2015 revealed Multiple pulmonary nodules in the upper and lower lobes consistent with metastatic disease;   Hepatic metastasis also visualized. 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 Mediport was placed. Cycle # 1 of FOLFOX + Avastin was on 11/23/2015. CEA was 4555 on 11/23/2015. Cycle # 2 of FOLFOX + Avastin was on 12/08/2015. CEA was 3160 on 12/08/2015. Cycle # 3 of FOLFOX + Avastin was on 12/22/2015. CEA was 2516 on 12/21/2015. Cycle # 4 of FOLFOX + Avastin was on 01/05/2016. CEA was 2098 on 01/05/2015. Cycle # 5 of FOLFOX + Avastin was on 01/19/2016. CEA was 1511 on 01/05/2015.     -Bone scan on 01/26/2016 noted no metastatic disease. CT chest on 01/26/2016 revealed Significant response to treatment with no visible residual nodules. CT scan abdomen/pelvis on 01/26/2016 noted Interval decreased size of multiple masses in the liver compatible with treatment response. Continue another 2 months of FOLFOX + Avastin and repeat scans. Cycle # 6 of FOLFOX + Avastin was on 02/02/2016.  on 02/02/2016. Cycle # 7 of FOLFOX + Avastin was on 02/16/2016.  on 02/16/2016. Cycle # 8 of FOLFOX + Avastin was on 03/01/2016.  on 03/01/2016. Cycle # 9 of FOLFOX + Avastin was on 03/15/2016.  on 03/15/2016. Cycle # 10 of FOLFOX + Avastin was on 03/29/2016. .4 on 03/29/2016. Cycle # 11 of FOLFOX + Avastin was on 04/12/2016. .4 on 04/12/2016.     Re-staging scans on 04/19/2016: CT Chest: clear lungs; no evidence of recurring pulmonary nodule; CT Abdomen/Pelvis: Further interval decrease in size of the multiple metastatic hepatic lesions, the largest lesion now measures 3.9 x 3.5 cm and previously measured 4.5 cm in maximum diameter. No mesenteric lymphadenopathy is identified; Bone Scan: No evidence of osseous metastasis.   Continue same regimen and kg/m²   GENERAL: Alert, oriented x 3, not in acute distress. HEENT: PERRLA, EOMI. No oral lesions. NECK: Supple. Without lymphadenopathy. LUNGS: Lungs are CTA bilaterally, with no wheezing, crackles or rhonchi. CARDIOVASCULAR: Regular rhythm. No murmurs, rubs or gallops. ABDOMEN: Soft. Non-tender, non-distended. Positive bowel sounds. EXTREMITIES: Without clubbing, cyanosis, or edema. NEUROLOGIC: No focal deficits. 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 KRAS Mutation: Mutation detected. BRAF Mutation: Mutation not detected. 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 CT scan abdomen/pelvis on 10/26/2015 revealed small nodules at the lung bases, extensive liver lesions consistent with metastatic rectosigmoid cancer. CEA 3488 on 10/30/2015. Bone scan on 11/10/2015 noted no metastatic disease. CT chest on 11/10/2015 revealed Multiple pulmonary nodules in the upper and lower lobes consistent with metastatic disease; Hepatic metastasis also visualized. 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 Mediport was placed. Cycle # 1 of FOLFOX + Avastin was on 11/23/2015. CEA was 4555 on 11/23/2015. Cycle # 2 of FOLFOX + Avastin was on 12/08/2015. CEA was 3160 on 12/08/2015. Cycle # 3 of FOLFOX + Avastin was on 12/22/2015. CEA was 2516 on 12/21/2015. Cycle # 4 of FOLFOX + Avastin was on 01/05/2016. CEA was 2098 on 01/05/2015. Cycle # 5 of FOLFOX + Avastin was on 01/19/2016. CEA was 1511 on 01/05/2015.     -Bone scan on 01/26/2016 noted no metastatic disease. CT chest on 01/26/2016 revealed Significant response to treatment with no visible residual nodules. CT scan abdomen/pelvis on 01/26/2016 noted Interval decreased size of multiple masses in the liver compatible with treatment response. Continue another 2 months of FOLFOX + Avastin and repeat scans.   Cycle # 6 of FOLFOX + FOLFIRI + Avastin was on 05/16/2017. CEA 7.1 on 05/16/2017. Cycle # 22 FOLFIRI + Avastin was on 05/30/2017. CEA 8.2 on 05/30/2017. Cycle # 23 FOLFIRI + Avastin was on 06/13/2017. CEA 7.7 on 06/13/2017. Cycle # 24 FOLFIRI + Avastin was on 06/27/2017. CEA 6.6 on 06/27/2017. Re-staging scans 07/05/2017:  Bone scan stable proximal left femoral diaphysis, right acetabulum, and left anterior sixth rib lesions;  CT abdomen/pelvis stable hypodense metastatic lesions within the liver; CT chest without convincing evidence of metastatic disease. Cycle # 25 FOLFIRI + Avastin was on 07/11/2017. CEA 6.3 on 07/11/2017. Cycle # 26 FOLFIRI + Avastin was on 07/25/2017. CEA 7.3 on 07/25/2017. Cycle # 27 FOLFIRI + Avastin was on 08/08/2017. CEA 6.5 on 08/08/2017. Cycle # 28 FOLFIRI + Avastin was on 08/22/2017. CEA 5.9 on 08/22/2017. Cycle # 29 FOLFIRI + Avastin was on 09/05/2017. CEA 6.3 on 09/05/2017. Cycle # 30 FOLFIRI + Avastin was on 09/19/2017. CEA 6.3 on 09/19/2017. Bone scan 09/26/2017 stable. CT abdomen/pelvis on 09/26/2017 Stable hypodense mass in the liver. Stable sclerotic lesion in the acetabulum. CT chest 09/26/2017 negative for metastatic disease. Cycle # 31 FOLFIRI + Avastin was on 10/03/2017. CEA 7.4 on 10/03/2017. Cycle # 32 FOLFIRI + Avastin was on 10/17/2017. CEA 6.0 on 10/17/2017. Cycle # 33 FOLFIRI + Avastin was on 10/31/2017. CEA 6.8 on 10/31/2017. Cycle # 34 FOLFIRI + Avastin was on 11/14/2017. CEA 7.2 on 11/14/2017. Cycle # 35 FOLFIRI + Avastin was on 11/28/2017. CEA 5.1 on 11/28/2017. Cycle # 36 FOLFIRI + Avastin was on 12/12/2017. CEA 6.1 on 12/12/2017. Bone scan 12/22/2017 noted no evidence of metastatic disease to the axial or appendicular skeleton. CT chest 12/22/2017 noted no evidence of metastatic disease. CT abdomen/pelvis 12/22/2017 noted stable hypodense lesions in the liver. Continue FOLFIRI + Avastin and repeat scans in 3 months.   Cycle # 37 FOLFIRI + Avastin was

## 2018-11-08 ENCOUNTER — HOSPITAL ENCOUNTER (OUTPATIENT)
Dept: INFUSION THERAPY | Age: 68
Discharge: HOME | End: 2018-11-08
Payer: MEDICARE

## 2018-11-08 DIAGNOSIS — C20 RECTAL CANCER METASTASIZED TO LIVER (HCC): ICD-10-CM

## 2018-11-08 DIAGNOSIS — C78.7 RECTAL CANCER METASTASIZED TO LIVER (HCC): ICD-10-CM

## 2018-11-08 PROCEDURE — 96372 THER/PROPH/DIAG INJ SC/IM: CPT

## 2018-11-08 PROCEDURE — 6360000002 HC RX W HCPCS: Performed by: INTERNAL MEDICINE

## 2018-11-08 RX ADMIN — PEGFILGRASTIM 6 MG: 6 INJECTION SUBCUTANEOUS at 13:50

## 2018-11-26 ENCOUNTER — HOSPITAL ENCOUNTER (OUTPATIENT)
Age: 68
Discharge: HOME | End: 2018-11-26
Payer: MEDICARE

## 2018-11-26 LAB
ALBUMIN SERPL-MCNC: 3.5 G/DL (ref 3.5–5.2)
ALP BLD-CCNC: 154 U/L (ref 40–129)
ALT SERPL-CCNC: 20 U/L (ref 0–40)
ANION GAP SERPL CALCULATED.3IONS-SCNC: 12 MMOL/L (ref 7–16)
AST SERPL-CCNC: 29 U/L (ref 0–39)
BASOPHILS ABSOLUTE: 0.05 E9/L (ref 0–0.2)
BASOPHILS RELATIVE PERCENT: 1 % (ref 0–2)
BILIRUB SERPL-MCNC: 0.8 MG/DL (ref 0–1.2)
BUN BLDV-MCNC: 14 MG/DL (ref 8–23)
CALCIUM SERPL-MCNC: 9.7 MG/DL (ref 8.6–10.2)
CEA: 6.9 NG/ML (ref 0–5.2)
CHLORIDE BLD-SCNC: 110 MMOL/L (ref 98–107)
CO2: 25 MMOL/L (ref 22–29)
CREAT SERPL-MCNC: 1 MG/DL (ref 0.7–1.2)
EOSINOPHILS ABSOLUTE: 0.23 E9/L (ref 0.05–0.5)
EOSINOPHILS RELATIVE PERCENT: 4.5 % (ref 0–6)
GFR AFRICAN AMERICAN: >60
GFR NON-AFRICAN AMERICAN: >60 ML/MIN/1.73
GLUCOSE BLD-MCNC: 126 MG/DL (ref 74–99)
HCT VFR BLD CALC: 30.6 % (ref 37–54)
HEMOGLOBIN: 9.6 G/DL (ref 12.5–16.5)
IMMATURE GRANULOCYTES #: 0.07 E9/L
IMMATURE GRANULOCYTES %: 1.4 % (ref 0–5)
LYMPHOCYTES ABSOLUTE: 0.69 E9/L (ref 1.5–4)
LYMPHOCYTES RELATIVE PERCENT: 13.5 % (ref 20–42)
MCH RBC QN AUTO: 32.2 PG (ref 26–35)
MCHC RBC AUTO-ENTMCNC: 31.4 % (ref 32–34.5)
MCV RBC AUTO: 102.7 FL (ref 80–99.9)
MONOCYTES ABSOLUTE: 0.35 E9/L (ref 0.1–0.95)
MONOCYTES RELATIVE PERCENT: 6.8 % (ref 2–12)
NEUTROPHILS ABSOLUTE: 3.72 E9/L (ref 1.8–7.3)
NEUTROPHILS RELATIVE PERCENT: 72.8 % (ref 43–80)
PDW BLD-RTO: 18.1 FL (ref 11.5–15)
PLATELET # BLD: 105 E9/L (ref 130–450)
PMV BLD AUTO: 10.4 FL (ref 7–12)
POTASSIUM SERPL-SCNC: 4 MMOL/L (ref 3.5–5)
RBC # BLD: 2.98 E12/L (ref 3.8–5.8)
SODIUM BLD-SCNC: 147 MMOL/L (ref 132–146)
TOTAL PROTEIN: 6.2 G/DL (ref 6.4–8.3)
WBC # BLD: 5.1 E9/L (ref 4.5–11.5)

## 2018-11-26 PROCEDURE — 80053 COMPREHEN METABOLIC PANEL: CPT

## 2018-11-26 PROCEDURE — 85025 COMPLETE CBC W/AUTO DIFF WBC: CPT

## 2018-11-26 PROCEDURE — 82378 CARCINOEMBRYONIC ANTIGEN: CPT

## 2018-11-26 PROCEDURE — 36415 COLL VENOUS BLD VENIPUNCTURE: CPT

## 2018-11-27 ENCOUNTER — HOSPITAL ENCOUNTER (OUTPATIENT)
Dept: INFUSION THERAPY | Age: 68
Discharge: HOME | End: 2018-11-27
Payer: MEDICARE

## 2018-11-27 ENCOUNTER — OFFICE VISIT (OUTPATIENT)
Dept: ONCOLOGY | Age: 68
End: 2018-11-27
Payer: MEDICARE

## 2018-11-27 VITALS
DIASTOLIC BLOOD PRESSURE: 68 MMHG | RESPIRATION RATE: 20 BRPM | HEART RATE: 66 BPM | BODY MASS INDEX: 26.78 KG/M2 | SYSTOLIC BLOOD PRESSURE: 133 MMHG | HEIGHT: 73 IN | TEMPERATURE: 97.5 F | WEIGHT: 202.1 LBS

## 2018-11-27 VITALS — SYSTOLIC BLOOD PRESSURE: 123 MMHG | RESPIRATION RATE: 18 BRPM | DIASTOLIC BLOOD PRESSURE: 66 MMHG | HEART RATE: 77 BPM

## 2018-11-27 DIAGNOSIS — C20 RECTAL CANCER (HCC): Primary | ICD-10-CM

## 2018-11-27 DIAGNOSIS — C78.7 RECTAL CANCER METASTASIZED TO LIVER (HCC): ICD-10-CM

## 2018-11-27 DIAGNOSIS — C20 RECTAL CANCER METASTASIZED TO LIVER (HCC): ICD-10-CM

## 2018-11-27 PROCEDURE — 96411 CHEMO IV PUSH ADDL DRUG: CPT

## 2018-11-27 PROCEDURE — 6360000002 HC RX W HCPCS: Performed by: INTERNAL MEDICINE

## 2018-11-27 PROCEDURE — 96413 CHEMO IV INFUSION 1 HR: CPT

## 2018-11-27 PROCEDURE — 96375 TX/PRO/DX INJ NEW DRUG ADDON: CPT

## 2018-11-27 PROCEDURE — 99214 OFFICE O/P EST MOD 30 MIN: CPT | Performed by: INTERNAL MEDICINE

## 2018-11-27 PROCEDURE — 96415 CHEMO IV INFUSION ADDL HR: CPT

## 2018-11-27 PROCEDURE — 2580000003 HC RX 258: Performed by: INTERNAL MEDICINE

## 2018-11-27 PROCEDURE — 96368 THER/DIAG CONCURRENT INF: CPT

## 2018-11-27 PROCEDURE — 96417 CHEMO IV INFUS EACH ADDL SEQ: CPT

## 2018-11-27 RX ORDER — SODIUM CHLORIDE 9 MG/ML
INJECTION, SOLUTION INTRAVENOUS CONTINUOUS
Status: CANCELLED | OUTPATIENT
Start: 2018-11-27

## 2018-11-27 RX ORDER — EPINEPHRINE 1 MG/ML
0.3 INJECTION, SOLUTION, CONCENTRATE INTRAVENOUS PRN
Status: CANCELLED | OUTPATIENT
Start: 2018-11-27

## 2018-11-27 RX ORDER — HEPARIN SODIUM (PORCINE) LOCK FLUSH IV SOLN 100 UNIT/ML 100 UNIT/ML
500 SOLUTION INTRAVENOUS PRN
Status: DISCONTINUED | OUTPATIENT
Start: 2018-11-27 | End: 2018-11-28 | Stop reason: HOSPADM

## 2018-11-27 RX ORDER — METHYLPREDNISOLONE SODIUM SUCCINATE 125 MG/2ML
125 INJECTION, POWDER, LYOPHILIZED, FOR SOLUTION INTRAMUSCULAR; INTRAVENOUS ONCE
Status: CANCELLED | OUTPATIENT
Start: 2018-11-27 | End: 2018-11-27

## 2018-11-27 RX ORDER — SODIUM CHLORIDE 9 MG/ML
250 INJECTION, SOLUTION INTRAVENOUS CONTINUOUS
Status: DISCONTINUED | OUTPATIENT
Start: 2018-11-27 | End: 2018-11-28 | Stop reason: HOSPADM

## 2018-11-27 RX ORDER — PALONOSETRON 0.05 MG/ML
0.25 INJECTION, SOLUTION INTRAVENOUS ONCE
Status: COMPLETED | OUTPATIENT
Start: 2018-11-27 | End: 2018-11-27

## 2018-11-27 RX ORDER — HEPARIN SODIUM (PORCINE) LOCK FLUSH IV SOLN 100 UNIT/ML 100 UNIT/ML
500 SOLUTION INTRAVENOUS PRN
Status: CANCELLED | OUTPATIENT
Start: 2018-11-27

## 2018-11-27 RX ORDER — PALONOSETRON 0.05 MG/ML
0.25 INJECTION, SOLUTION INTRAVENOUS ONCE
Status: CANCELLED | OUTPATIENT
Start: 2018-11-27

## 2018-11-27 RX ORDER — DIPHENHYDRAMINE HYDROCHLORIDE 50 MG/ML
50 INJECTION INTRAMUSCULAR; INTRAVENOUS ONCE
Status: CANCELLED | OUTPATIENT
Start: 2018-11-27 | End: 2018-11-27

## 2018-11-27 RX ORDER — ATROPINE SULFATE 0.4 MG/ML
0.4 AMPUL (ML) INJECTION ONCE
Status: CANCELLED | OUTPATIENT
Start: 2018-11-27 | End: 2018-11-27

## 2018-11-27 RX ORDER — SODIUM CHLORIDE 0.9 % (FLUSH) 0.9 %
10 SYRINGE (ML) INJECTION PRN
Status: DISCONTINUED | OUTPATIENT
Start: 2018-11-27 | End: 2018-11-28 | Stop reason: HOSPADM

## 2018-11-27 RX ORDER — SODIUM CHLORIDE 0.9 % (FLUSH) 0.9 %
10 SYRINGE (ML) INJECTION PRN
Status: CANCELLED | OUTPATIENT
Start: 2018-11-27

## 2018-11-27 RX ORDER — ATROPINE SULFATE 0.4 MG/ML
0.4 AMPUL (ML) INJECTION ONCE
Status: COMPLETED | OUTPATIENT
Start: 2018-11-27 | End: 2018-11-27

## 2018-11-27 RX ORDER — DEXAMETHASONE SODIUM PHOSPHATE 10 MG/ML
10 INJECTION, SOLUTION INTRAMUSCULAR; INTRAVENOUS ONCE
Status: CANCELLED | OUTPATIENT
Start: 2018-11-27

## 2018-11-27 RX ORDER — DEXAMETHASONE SODIUM PHOSPHATE 10 MG/ML
10 INJECTION, SOLUTION INTRAMUSCULAR; INTRAVENOUS ONCE
Status: COMPLETED | OUTPATIENT
Start: 2018-11-27 | End: 2018-11-27

## 2018-11-27 RX ORDER — SODIUM CHLORIDE 9 MG/ML
250 INJECTION, SOLUTION INTRAVENOUS CONTINUOUS
Status: CANCELLED | OUTPATIENT
Start: 2018-11-27 | End: 2018-12-15

## 2018-11-27 RX ORDER — FLUOROURACIL 50 MG/ML
850 INJECTION, SOLUTION INTRAVENOUS ONCE
Status: COMPLETED | OUTPATIENT
Start: 2018-11-27 | End: 2018-11-27

## 2018-11-27 RX ORDER — 0.9 % SODIUM CHLORIDE 0.9 %
10 VIAL (ML) INJECTION ONCE
Status: CANCELLED | OUTPATIENT
Start: 2018-11-27 | End: 2018-11-27

## 2018-11-27 RX ORDER — FLUOROURACIL 50 MG/ML
850 INJECTION, SOLUTION INTRAVENOUS ONCE
Status: CANCELLED | OUTPATIENT
Start: 2018-11-27

## 2018-11-27 RX ADMIN — BEVACIZUMAB 400 MG: 400 INJECTION, SOLUTION INTRAVENOUS at 10:12

## 2018-11-27 RX ADMIN — ATROPINE SULFATE 0.4 MG: 0.4 INJECTION, SOLUTION INTRAMUSCULAR; INTRAVENOUS; SUBCUTANEOUS at 09:42

## 2018-11-27 RX ADMIN — Medication 10 ML: at 12:56

## 2018-11-27 RX ADMIN — PALONOSETRON HYDROCHLORIDE 0.25 MG: 0.25 INJECTION INTRAVENOUS at 09:40

## 2018-11-27 RX ADMIN — SODIUM CHLORIDE 400 MG: 9 INJECTION, SOLUTION INTRAVENOUS at 11:06

## 2018-11-27 RX ADMIN — SODIUM CHLORIDE 250 ML: 9 INJECTION, SOLUTION INTRAVENOUS at 09:38

## 2018-11-27 RX ADMIN — DEXAMETHASONE SODIUM PHOSPHATE 10 MG: 10 INJECTION, SOLUTION INTRAMUSCULAR; INTRAVENOUS at 09:44

## 2018-11-27 RX ADMIN — FLUOROURACIL 850 MG: 50 INJECTION, SOLUTION INTRAVENOUS at 12:49

## 2018-11-27 RX ADMIN — LEUCOVORIN CALCIUM 850 MG: 350 INJECTION, POWDER, LYOPHILIZED, FOR SOLUTION INTRAMUSCULAR; INTRAVENOUS at 11:05

## 2018-11-27 RX ADMIN — Medication 500 UNITS: at 12:56

## 2018-11-27 NOTE — PROGRESS NOTES
on 08/30/2016. Cycle # 4 FOLFIRI/Avastin was on 09/13/2016. CEA 23.4 on 09/13/2016. Cycle # 5 FOLFIRI/Avastin was on 09/27/2016. CEA 22.3 on 09/27/2016. Cycle # 6 FOLFIRI/Avastin was on 10/11/2016. CEA 18.4 on 10/11/2016.      Bone scan 10/21/2016 stable bone metastasis; ? New lesion anterior left sixth rib likely interval healing fracture. CT abdomen/pelvis revealed stable hepatic metastasis. CT chest negative for metastatic disease. Continue FOLFIRI/Avastin and repeat scans in 3 months. Cycle # 7 FOLFIRI/Avastin was on 10/25/2016. CEA 16.1  Cycle # 8 FOLFIRI/Avastin was on 11/08/2016. CEA 15.7  Cycle # 9 FOLFIRI/Avastin was on 11/29/2016. CEA 13.6 on 11/29/2016. Cycle # 10 FOLFIRI/Avastin was on 12/13/2016. CEA 10.6 on 12/13/2016. Cycle # 11 FOLFIRI/Avastin was on 12/27/2016. CEA 11.1 on 12/27/2016. Cycle # 12 FOLFIRI/Avastin was on 01/10/2017. CEA 9.7 on 01/10/2017.       CT chest 01/23/2017 No new pulmonary nodule or lymphadenopathy. Patchy groundglass opacities within the left lower lobe, suggestive of infectious or inflammatory etiology. Followup CT chest in 3 months. Slight increased linear sclerosis along the left anterior sixth rib corresponding to an area of increased uptake on the prior bone scan from 10/21/2016, favored to be related to interval healing changes of a nondisplaced fracture. CT abdomen/pelvis on 01/23/2017 Redemonstration of multiple hepatic metastases, which are similar compared to the prior CT from 10/21/2016. Redemonstration of area of increased sclerosis along the right acetabulum suspicious for metastatic disease. Bone scan on 01/23/2017 Stable subtle uptake within the left proximal diaphysis, again suggestive of metastatic disease  Decreased subtle uptake within the medial right acetabulum. Decreased uptake within the left anterior sixth rib corresponding to linear sclerosis on the recent CT, favored to be related to an joint rib fracture.     Continue FOLFIRI + Avastin Avastin was on 07/24/2018. Cycle # 52 FOLFIRI + Avastin was on 08/14/2018. Cycle # 53 FOLFIRI + Avastin was on 08/28/2018. CT chest 09/06/2018 noted interval decrease in size of LUCY measuring up to 4 mm, suggestive of treatment response. No mediastinal or hilar LN  CT abdomen/pelvis 09/06/2018 Slight interval increase in size of a previously noted metastatic lesion within the right hepatic lobe. This may be related to differences in phase of enhancement compared to the most recent study from 5/31/2018, the lesion remains decreased in size compared to the more previous studies. No abdominal, retroperitoneal, or pelvic lymphadenopathy. Continue FOLFIRI + Avastin and repeat scans in 2 months. Cycle # 54 FOLFIRI + Avastin was on 09/11/2018. Cycle # 55 FOLFIRI + Avastin was on 09/25/2018. Cycle # 56 FOLFIRI + Avastin was on 10/09/2018. Cycle # 57 FOLFIRI + Avastin was on 10/23/2018. CT chest 11/02/2018 stable 3 mm nodule within LUCY. No new right and left lung nodule. No mediastinal or osseous lesion. CT abdomen/pelvis 11/02/2018 stable metastatic disease to liver. No new metastatic disease identified. No mesenteric or retroperitoneal LN. No gross colonic lesion identified. Continue FOLFIRI + Avastin and repeat scans in 3 months. Cycle # 58 FOLFIRI + Avastin was on 11/06/2018. CEA 7.6 on 11/05/2018. Review of Systems;  CONSTITUTIONAL: No fever, chills. Fair appetite. Mild fatigue  ENMT: Eyes: no diplopia; Nose: No epistaxis. Mouth: No oral lesions. RESPIRATORY: No hemoptysis, shortness of breath, cough. CARDIOVASCULAR: No chest pain, palpitations. GASTROINTESTINAL: No nausea/vomiting, abdominal pain. Intermittent diarrhea  GENITOURINARY: No dysuria, urinary frequency, hematuria. NEURO: No syncope, presyncope, headache. Remainder ROS: Negative.     Past Medical History:   Past Medical History               Diagnosis Date    Arthritis      Cancer ([address]         colon    Depression      epithelium), negative for epithelial dysplasia, esophageal squamous mucosa not identified     Cycle # 16 FOLFOX (discontinued avastin (bevacizumab) given association of peptic ulcer disease and known association of GI perforation.) was on 06/21/2016. CEA 47 on 06/21/2016. Cycle # 17 FOLFOX was on 07/05/2016. CEA 52.6 on 07/05/2016. CEA 47.6 on 07/19/2016.     Re-staging scans 07/19/2106: CT Chest negative for metastatic disease. CT Abdomen/Pelvis: Stable hepatic lesions; Question new mural thickening in the cecum. Bone Scan: New Let hip lesion suspicious for bone metastasis.     Increased CEA; new mural thickening in the cecum and new left hip lesion suspicious for bone metastasis are consistent with disease progression; He derived maximum benefit from FOLFOX/AVASTIN. D/C FOLFOX/AVASTIN. We recommended FOLFIRI second line therapy. Cycle # 1 FOLFIRI was on 08/02/2016. CEA 40.8 on 08/02/2016. Xgeva q4 weeks started on 08/02/2016. Cycle # 2 FOLFIRI was on 08/16/2016. CEA 31.7 on 08/16/2016. Cycle # 3 FOLFIRI (added Avastin) was on 08/30/2016 given that ulcers healed on EGD 08/15/2016 by Dr. Julian Halsted; Protonix bid since avastin re-started. Colonoscopy on 08/29/2016 (to look at the cecal area) unremarkable. CEA 26.7 on 08/30/2016. Cycle # 4 FOLFIRI/Avastin was on 09/13/2016. CEA 23.4 on 09/13/2016. Cycle # 5 FOLFIRI/Avastin was on 09/27/2016. CEA 22.3 on 09/27/2016. Cycle # 6 FOLFIRI/Avastin was on 10/11/2016. CEA 18.4 on 10/11/2016.      Bone scan 10/21/2016 stable bone metastasis; ? New lesion anterior left sixth rib likely interval healing fracture. CT abdomen/pelvis revealed stable hepatic metastasis. CT chest negative for metastatic disease. Continue FOLFIRI/Avastin and repeat scans in 3 months. Cycle # 7 FOLFIRI/Avastin was on 10/25/2016. CEA 16.1 on 10/25/2016. Cycle # 8 FOLFIRI/Avastin was on 11/08/2016. CEA 15.7 on 11/08/2016. Cycle # 9 FOLFIRI/Avastin was on 11/29/2016.    CEA 13.6 on 11/29/2016. Cycle # 10 FOLFIRI/Avastin was on 12/13/2016. CEA 10.6 on 12/13/2016. Cycle # 11 FOLFIRI/Avastin was on 12/27/2016. CEA 11.1 on 12/27/2016. Cycle # 12 FOLFIRI/Avastin was on 01/10/2017. CEA 9.7 on 01/10/2017. CT chest 01/23/2017 No new pulmonary nodule or lymphadenopathy. Patchy groundglass opacities within the left lower lobe, suggestive of infectious or inflammatory etiology. Followup CT chest in 3 months. Slight increased linear sclerosis along the left anterior sixth rib corresponding to an area of increased uptake on the prior bone scan from 10/21/2016, favored to be related to interval healing changes of a nondisplaced fracture. CT abdomen/pelvis on 01/23/2017 Redemonstration of multiple hepatic metastases, which are similar compared to the prior CT from 10/21/2016. Redemonstration of area of increased sclerosis along the right acetabulum suspicious for metastatic disease. Bone scan on 01/23/2017 Stable subtle uptake within the left proximal diaphysis, again suggestive of metastatic disease  Decreased subtle uptake within the medial right acetabulum. Decreased uptake within the left anterior sixth rib corresponding to linear sclerosis on the recent CT, favored to be related to an joint rib fracture. Continue FOLFIRI + Avastin and repeat scans in 3 months. Cycle # 13 FOLFIRI + Avastin was on 01/24/2017. Cycle # 14 FOLFIRI + Avastin was on 02/07/2017. Cycle # 15 FOLFIRI + Avastin was on 02/21/2017. Cycle # 16 FOLFIRI + Avastin was on 03/07/2017. Cycle # 17 FOLFIRI + Avastin was on 03/21/2017. Cycle # 18 FOLFIRI + Avastin was on 04/04/2017. CT chest 04/17/2017 negative for metastatic disease. CT abdomen/pelvis 04/17/2017 Stable hypodense metastatic lesions within the liver. Stable area of increased sclerosis along the right acetabulum  Bone scan 04/17/2017 Stable subtle uptake within the left proximal femoral diaphysis;  No definite abnormal uptake in the region of a

## 2018-11-29 ENCOUNTER — HOSPITAL ENCOUNTER (OUTPATIENT)
Dept: INFUSION THERAPY | Age: 68
Discharge: HOME | End: 2018-11-29
Payer: MEDICARE

## 2018-11-29 DIAGNOSIS — C78.7 RECTAL CANCER METASTASIZED TO LIVER (HCC): ICD-10-CM

## 2018-11-29 DIAGNOSIS — C20 RECTAL CANCER METASTASIZED TO LIVER (HCC): ICD-10-CM

## 2018-11-29 PROCEDURE — 96372 THER/PROPH/DIAG INJ SC/IM: CPT

## 2018-11-29 PROCEDURE — 6360000002 HC RX W HCPCS: Performed by: INTERNAL MEDICINE

## 2018-11-29 RX ADMIN — PEGFILGRASTIM 6 MG: 6 INJECTION SUBCUTANEOUS at 14:42

## 2018-12-10 ENCOUNTER — TELEPHONE (OUTPATIENT)
Dept: INFUSION THERAPY | Age: 68
End: 2018-12-10

## 2018-12-10 ENCOUNTER — HOSPITAL ENCOUNTER (OUTPATIENT)
Age: 68
Discharge: HOME | End: 2018-12-10
Payer: MEDICARE

## 2018-12-10 LAB
ALBUMIN SERPL-MCNC: 3.6 G/DL (ref 3.5–5.2)
ALP BLD-CCNC: 142 U/L (ref 40–129)
ALT SERPL-CCNC: 19 U/L (ref 0–40)
ANION GAP SERPL CALCULATED.3IONS-SCNC: 8 MMOL/L (ref 7–16)
ANISOCYTOSIS: ABNORMAL
AST SERPL-CCNC: 27 U/L (ref 0–39)
BASOPHILS ABSOLUTE: 0.04 E9/L (ref 0–0.2)
BASOPHILS RELATIVE PERCENT: 1.8 % (ref 0–2)
BILIRUB SERPL-MCNC: 0.6 MG/DL (ref 0–1.2)
BUN BLDV-MCNC: 14 MG/DL (ref 8–23)
CALCIUM SERPL-MCNC: 8.8 MG/DL (ref 8.6–10.2)
CEA: 6.7 NG/ML (ref 0–5.2)
CHLORIDE BLD-SCNC: 108 MMOL/L (ref 98–107)
CO2: 27 MMOL/L (ref 22–29)
CREAT SERPL-MCNC: 0.9 MG/DL (ref 0.7–1.2)
EOSINOPHILS ABSOLUTE: 0.13 E9/L (ref 0.05–0.5)
EOSINOPHILS RELATIVE PERCENT: 6.1 % (ref 0–6)
GFR AFRICAN AMERICAN: >60
GFR NON-AFRICAN AMERICAN: >60 ML/MIN/1.73
GLUCOSE BLD-MCNC: 145 MG/DL (ref 74–99)
HCT VFR BLD CALC: 27.1 % (ref 37–54)
HEMOGLOBIN: 8.7 G/DL (ref 12.5–16.5)
HYPOCHROMIA: ABNORMAL
LYMPHOCYTES ABSOLUTE: 0.37 E9/L (ref 1.5–4)
LYMPHOCYTES RELATIVE PERCENT: 16.7 % (ref 20–42)
MCH RBC QN AUTO: 32.8 PG (ref 26–35)
MCHC RBC AUTO-ENTMCNC: 32.1 % (ref 32–34.5)
MCV RBC AUTO: 102.3 FL (ref 80–99.9)
MONOCYTES ABSOLUTE: 0.13 E9/L (ref 0.1–0.95)
MONOCYTES RELATIVE PERCENT: 6.1 % (ref 2–12)
NEUTROPHILS ABSOLUTE: 1.52 E9/L (ref 1.8–7.3)
NEUTROPHILS RELATIVE PERCENT: 69.3 % (ref 43–80)
OVALOCYTES: ABNORMAL
PDW BLD-RTO: 17.2 FL (ref 11.5–15)
PLATELET # BLD: 87 E9/L (ref 130–450)
PLATELET CONFIRMATION: NORMAL
PMV BLD AUTO: 10.5 FL (ref 7–12)
POIKILOCYTES: ABNORMAL
POLYCHROMASIA: ABNORMAL
POTASSIUM SERPL-SCNC: 3.9 MMOL/L (ref 3.5–5)
RBC # BLD: 2.65 E12/L (ref 3.8–5.8)
SODIUM BLD-SCNC: 143 MMOL/L (ref 132–146)
TEAR DROP CELLS: ABNORMAL
TOTAL PROTEIN: 6.1 G/DL (ref 6.4–8.3)
WBC # BLD: 2.2 E9/L (ref 4.5–11.5)

## 2018-12-10 PROCEDURE — 82378 CARCINOEMBRYONIC ANTIGEN: CPT

## 2018-12-10 PROCEDURE — 85025 COMPLETE CBC W/AUTO DIFF WBC: CPT

## 2018-12-10 PROCEDURE — 80053 COMPREHEN METABOLIC PANEL: CPT

## 2018-12-10 PROCEDURE — 36415 COLL VENOUS BLD VENIPUNCTURE: CPT

## 2018-12-11 ENCOUNTER — HOSPITAL ENCOUNTER (OUTPATIENT)
Dept: INFUSION THERAPY | Age: 68
Discharge: HOME | End: 2018-12-11
Payer: MEDICARE

## 2018-12-11 ENCOUNTER — OFFICE VISIT (OUTPATIENT)
Dept: ONCOLOGY | Age: 68
End: 2018-12-11
Payer: MEDICARE

## 2018-12-11 VITALS — SYSTOLIC BLOOD PRESSURE: 140 MMHG | DIASTOLIC BLOOD PRESSURE: 75 MMHG | HEART RATE: 88 BPM | RESPIRATION RATE: 20 BRPM

## 2018-12-11 VITALS
HEART RATE: 63 BPM | DIASTOLIC BLOOD PRESSURE: 71 MMHG | RESPIRATION RATE: 20 BRPM | SYSTOLIC BLOOD PRESSURE: 128 MMHG | TEMPERATURE: 97.4 F | BODY MASS INDEX: 25.98 KG/M2 | WEIGHT: 196 LBS | HEIGHT: 73 IN

## 2018-12-11 DIAGNOSIS — C20 RECTAL CANCER (HCC): Primary | ICD-10-CM

## 2018-12-11 DIAGNOSIS — C78.7 RECTAL CANCER METASTASIZED TO LIVER (HCC): ICD-10-CM

## 2018-12-11 DIAGNOSIS — C20 RECTAL CANCER METASTASIZED TO LIVER (HCC): ICD-10-CM

## 2018-12-11 DIAGNOSIS — C79.51 BONE METASTASIS (HCC): ICD-10-CM

## 2018-12-11 PROCEDURE — 99214 OFFICE O/P EST MOD 30 MIN: CPT | Performed by: INTERNAL MEDICINE

## 2018-12-11 PROCEDURE — 96372 THER/PROPH/DIAG INJ SC/IM: CPT

## 2018-12-11 PROCEDURE — 2580000003 HC RX 258: Performed by: INTERNAL MEDICINE

## 2018-12-11 PROCEDURE — 6360000002 HC RX W HCPCS: Performed by: INTERNAL MEDICINE

## 2018-12-11 PROCEDURE — 96417 CHEMO IV INFUS EACH ADDL SEQ: CPT

## 2018-12-11 PROCEDURE — 96415 CHEMO IV INFUSION ADDL HR: CPT

## 2018-12-11 PROCEDURE — 96375 TX/PRO/DX INJ NEW DRUG ADDON: CPT

## 2018-12-11 PROCEDURE — 96368 THER/DIAG CONCURRENT INF: CPT

## 2018-12-11 PROCEDURE — 96413 CHEMO IV INFUSION 1 HR: CPT

## 2018-12-11 PROCEDURE — 96411 CHEMO IV PUSH ADDL DRUG: CPT

## 2018-12-11 RX ORDER — 0.9 % SODIUM CHLORIDE 0.9 %
10 VIAL (ML) INJECTION ONCE
Status: CANCELLED | OUTPATIENT
Start: 2018-12-11 | End: 2018-12-11

## 2018-12-11 RX ORDER — SODIUM CHLORIDE 9 MG/ML
250 INJECTION, SOLUTION INTRAVENOUS CONTINUOUS
Status: DISCONTINUED | OUTPATIENT
Start: 2018-12-11 | End: 2018-12-12 | Stop reason: HOSPADM

## 2018-12-11 RX ORDER — HEPARIN SODIUM (PORCINE) LOCK FLUSH IV SOLN 100 UNIT/ML 100 UNIT/ML
500 SOLUTION INTRAVENOUS PRN
Status: CANCELLED | OUTPATIENT
Start: 2018-12-11

## 2018-12-11 RX ORDER — DIPHENHYDRAMINE HYDROCHLORIDE 50 MG/ML
50 INJECTION INTRAMUSCULAR; INTRAVENOUS ONCE
Status: CANCELLED | OUTPATIENT
Start: 2018-12-11 | End: 2018-12-11

## 2018-12-11 RX ORDER — PALONOSETRON 0.05 MG/ML
0.25 INJECTION, SOLUTION INTRAVENOUS ONCE
Status: COMPLETED | OUTPATIENT
Start: 2018-12-11 | End: 2018-12-11

## 2018-12-11 RX ORDER — EPINEPHRINE 1 MG/ML
0.3 INJECTION, SOLUTION, CONCENTRATE INTRAVENOUS PRN
Status: CANCELLED | OUTPATIENT
Start: 2018-12-11

## 2018-12-11 RX ORDER — FLUOROURACIL 50 MG/ML
850 INJECTION, SOLUTION INTRAVENOUS ONCE
Status: CANCELLED | OUTPATIENT
Start: 2018-12-11

## 2018-12-11 RX ORDER — SODIUM CHLORIDE 9 MG/ML
250 INJECTION, SOLUTION INTRAVENOUS CONTINUOUS
Status: CANCELLED | OUTPATIENT
Start: 2018-12-11 | End: 2018-12-28

## 2018-12-11 RX ORDER — DEXAMETHASONE SODIUM PHOSPHATE 10 MG/ML
10 INJECTION, SOLUTION INTRAMUSCULAR; INTRAVENOUS ONCE
Status: CANCELLED | OUTPATIENT
Start: 2018-12-11

## 2018-12-11 RX ORDER — ATROPINE SULFATE 0.4 MG/ML
0.4 AMPUL (ML) INJECTION ONCE
Status: CANCELLED | OUTPATIENT
Start: 2018-12-11 | End: 2018-12-11

## 2018-12-11 RX ORDER — DEXAMETHASONE SODIUM PHOSPHATE 100 MG/10ML
10 INJECTION INTRAMUSCULAR; INTRAVENOUS ONCE
Status: COMPLETED | OUTPATIENT
Start: 2018-12-11 | End: 2018-12-11

## 2018-12-11 RX ORDER — SODIUM CHLORIDE 0.9 % (FLUSH) 0.9 %
10 SYRINGE (ML) INJECTION PRN
Status: DISCONTINUED | OUTPATIENT
Start: 2018-12-11 | End: 2018-12-12 | Stop reason: HOSPADM

## 2018-12-11 RX ORDER — HEPARIN SODIUM (PORCINE) LOCK FLUSH IV SOLN 100 UNIT/ML 100 UNIT/ML
500 SOLUTION INTRAVENOUS PRN
Status: DISCONTINUED | OUTPATIENT
Start: 2018-12-11 | End: 2018-12-12 | Stop reason: HOSPADM

## 2018-12-11 RX ORDER — PALONOSETRON 0.05 MG/ML
0.25 INJECTION, SOLUTION INTRAVENOUS ONCE
Status: CANCELLED | OUTPATIENT
Start: 2018-12-11

## 2018-12-11 RX ORDER — SODIUM CHLORIDE 0.9 % (FLUSH) 0.9 %
10 SYRINGE (ML) INJECTION PRN
Status: CANCELLED | OUTPATIENT
Start: 2018-12-11

## 2018-12-11 RX ORDER — ATROPINE SULFATE 0.4 MG/ML
0.4 AMPUL (ML) INJECTION ONCE
Status: COMPLETED | OUTPATIENT
Start: 2018-12-11 | End: 2018-12-11

## 2018-12-11 RX ORDER — FLUOROURACIL 50 MG/ML
850 INJECTION, SOLUTION INTRAVENOUS ONCE
Status: COMPLETED | OUTPATIENT
Start: 2018-12-11 | End: 2018-12-11

## 2018-12-11 RX ORDER — METHYLPREDNISOLONE SODIUM SUCCINATE 125 MG/2ML
125 INJECTION, POWDER, LYOPHILIZED, FOR SOLUTION INTRAMUSCULAR; INTRAVENOUS ONCE
Status: CANCELLED | OUTPATIENT
Start: 2018-12-11 | End: 2018-12-11

## 2018-12-11 RX ORDER — SODIUM CHLORIDE 9 MG/ML
INJECTION, SOLUTION INTRAVENOUS CONTINUOUS
Status: CANCELLED | OUTPATIENT
Start: 2018-12-11

## 2018-12-11 RX ADMIN — LEUCOVORIN CALCIUM 850 MG: 350 INJECTION, POWDER, LYOPHILIZED, FOR SOLUTION INTRAMUSCULAR; INTRAVENOUS at 10:18

## 2018-12-11 RX ADMIN — Medication 500 UNITS: at 12:34

## 2018-12-11 RX ADMIN — DENOSUMAB 120 MG: 120 INJECTION SUBCUTANEOUS at 09:50

## 2018-12-11 RX ADMIN — DEXAMETHASONE SODIUM PHOSPHATE 10 MG: 10 INJECTION, SOLUTION INTRAMUSCULAR; INTRAVENOUS at 09:48

## 2018-12-11 RX ADMIN — BEVACIZUMAB 400 MG: 400 INJECTION, SOLUTION INTRAVENOUS at 08:59

## 2018-12-11 RX ADMIN — FLUOROURACIL 850 MG: 50 INJECTION, SOLUTION INTRAVENOUS at 12:27

## 2018-12-11 RX ADMIN — ATROPINE SULFATE 0.4 MG: 0.4 INJECTION, SOLUTION INTRAMUSCULAR; INTRAVENOUS; SUBCUTANEOUS at 09:43

## 2018-12-11 RX ADMIN — PALONOSETRON HYDROCHLORIDE 0.25 MG: 0.25 INJECTION INTRAVENOUS at 09:41

## 2018-12-11 RX ADMIN — SODIUM CHLORIDE 250 ML: 9 INJECTION, SOLUTION INTRAVENOUS at 08:54

## 2018-12-11 RX ADMIN — SODIUM CHLORIDE 400 MG: 9 INJECTION, SOLUTION INTRAVENOUS at 10:19

## 2018-12-11 RX ADMIN — Medication 10 ML: at 12:34

## 2018-12-11 NOTE — PROGRESS NOTES
María Elena Heredia, Lead RN notified of abnormal labs (platelets-[id number]), okay to proceed with chemotherapy today per Dr Tata Mendez.

## 2018-12-11 NOTE — PROGRESS NOTES
3488. Bone scan on 11/10/2015 noted no metastatic disease. CT chest on 11/10/2015 revealed Multiple pulmonary nodules in the upper and lower lobes consistent with metastatic disease;   Hepatic metastasis also visualized. 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 Mediport was placed. Cycle # 1 of FOLFOX + Avastin was on 11/23/2015. CEA was 4555 on 11/23/2015. Cycle # 2 of FOLFOX + Avastin was on 12/08/2015. CEA was 3160 on 12/08/2015. Cycle # 3 of FOLFOX + Avastin was on 12/22/2015. CEA was 2516 on 12/21/2015. Cycle # 4 of FOLFOX + Avastin was on 01/05/2016. CEA was 2098 on 01/05/2015. Cycle # 5 of FOLFOX + Avastin was on 01/19/2016. CEA was 1511 on 01/05/2015.     -Bone scan on 01/26/2016 noted no metastatic disease. CT chest on 01/26/2016 revealed Significant response to treatment with no visible residual nodules. CT scan abdomen/pelvis on 01/26/2016 noted Interval decreased size of multiple masses in the liver compatible with treatment response. Continue another 2 months of FOLFOX + Avastin and repeat scans. Cycle # 6 of FOLFOX + Avastin was on 02/02/2016.  on 02/02/2016. Cycle # 7 of FOLFOX + Avastin was on 02/16/2016.  on 02/16/2016. Cycle # 8 of FOLFOX + Avastin was on 03/01/2016.  on 03/01/2016. Cycle # 9 of FOLFOX + Avastin was on 03/15/2016.  on 03/15/2016. Cycle # 10 of FOLFOX + Avastin was on 03/29/2016. .4 on 03/29/2016. Cycle # 11 of FOLFOX + Avastin was on 04/12/2016. .4 on 04/12/2016.     Re-staging scans on 04/19/2016: CT Chest: clear lungs; no evidence of recurring pulmonary nodule; CT Abdomen/Pelvis: Further interval decrease in size of the multiple metastatic hepatic lesions, the largest lesion now measures 3.9 x 3.5 cm and previously measured 4.5 cm in maximum diameter. No mesenteric lymphadenopathy is identified; Bone Scan: No evidence of osseous metastasis.   Continue same regimen and and repeat scans in 3 months. Cycle # 13 FOLFIRI + Avastin was on 01/24/2017. Cycle # 14 FOLFIRI + Avastin was on 02/07/2017. Cycle # 15 FOLFIRI + Avastin was on 02/21/2017. Cycle # 16 FOLFIRI + Avastin was on 03/07/2017. Cycle # 17 FOLFIRI + Avastin was on 03/21/2017. Cycle # 18 FOLFIRI + Avastin was on 04/04/2017. CT chest 04/17/2017 negative for metastatic disease. CT abdomen/pelvis 04/17/2017 Stable hypodense metastatic lesions within the liver. Stable area of increased sclerosis along the right acetabulum  Bone scan 04/17/2017 Stable subtle uptake within the left proximal femoral diaphysis; No definite abnormal uptake in the region of a sclerotic lesion along the posterior column of the right acetabulum noted on CT. Stable slight uptake within the left anterior sixth rib corresponding to linear sclerosis on the recent CT, favored to be related to a fracture. Continue FOLFIRI + Avastin and repeat scans in 3 months. Cycle # 19 FOLFIRI + Avastin was on 04/18/2017. Cycle # 20 FOLFIRI + Avastin was on 05/02/2017. Cycle # 21 FOLFIRI + Avastin was on 05/16/2017. Cycle # 22 FOLFIRI + Avastin was on 05/30/2017. Cycle # 23 FOLFIRI + Avastin was on 06/13/2017. Cycle # 24 FOLFIRI + Avastin was on 06/27/2017. Cycle # 25 FOLFIRI + Avastin was on 07/11/2017. Cycle # 26 FOLFIRI + Avastin was on 07/25/2017. Cycle # 27 FOLFIRI + Avastin was on 08/08/2017. Cycle # 28 FOLFIRI + Avastin was on 08/22/2017. Cycle # 29 FOLFIRI + Avastin was on 09/05/2017. Cycle # 30 FOLFIRI + Avastin was on 09/19/2017. Bone scan 09/26/2017 stable. CT abdomen/pelvis on 09/26/2017 Stable hypodense mass in the liver. Stable sclerotic lesion in the acetabulum. CT chest 09/26/2017 negative for metastatic disease. Cycle # 31 FOLFIRI + Avastin was on 10/03/2017. CEA 7.4 on 10/03/2017. Cycle # 32 FOLFIRI + Avastin was on 10/17/2017. CEA 6.0 on 10/17/2017. Cycle # 33 FOLFIRI + Avastin was on 10/31/2017.  CEA 6.8 on 10/31/2017. Cycle # 34 FOLFIRI + Avastin was on 11/14/2017. CEA 7.2 on 11/14/2017. Cycle # 35 FOLFIRI + Avastin was on 11/28/2017. CEA 5.1 on 11/28/2017. Cycle # 36 FOLFIRI + Avastin was on 12/12/2017. CEA 6.1 on 12/12/2017. Bone scan 12/22/2017 noted no evidence of metastatic disease to the axial or appendicular skeleton. CT chest 12/22/2017 noted no evidence of metastatic disease. CT abdomen/pelvis 12/22/2017 noted stable hypodense lesions in the liver. Continue FOLFIRI + Avastin and repeat scans in 3 months. Cycle # 37 FOLFIRI + Avastin was on 12/27/2018. Cycle # 38 FOLFIRI + Avastin was on 01/09/2018. Cycle # 39 FOLFIRI + Avastin was on 01/23/2018. Cycle # 40 FOLFIRI + Avastin was on 02/06/2018. Cycle # 41 FOLFIRI + Avastin was on 02/20/2018. Cycle # 42 FOLFIRI + Avastin was on 03/06/2018. Cycle # 43 FOLFIRI + Avastin was on 03/20/2018. Bone scan on 03/26/2018 negative for metastatic disease. CT chest 03/26/2018 noted ? new 7 mm nodule in LUCY. CT abdomen/pelvis 03/26/2018 noted stable hypodense lesions in the liver. ? New small ascites in the abdomen. Patient wants to continue to monitor the lung finding and repeat scans in 2 months instead of changing the current regimen into oral Lonsurf. Cycle # 44 FOLFIRI + Avastin was on 04/03/2018. CEA 6.3 on 04/03/2018. Cycle # 45 FOLFIRI + Avastin was on 04/24/2018. CEA 5.3 on 04/24/2018. Cycle # 46 FOLFIRI + Avastin was on 05/08/2018. CEA 5.4 on 05/08/2018. Cycle # 47 FOLFIRI + Avastin was on 05/22/2018. CEA 6.1 on 05/22/2018. CT chest 05/31/2018 noted stable nodule in LUCY. No evidence of worsening malignancy. CT abdomen/pelvis on 05/31/2018 noted stable liver metastasis. No evidence of worsening malignancy. Continue FOLFIRI + Avastin and repeat scans in 3 months. Cycle # 48 FOLFIRI + Avastin was on 06/06/2018. Cycle # 49 FOLFIRI + Avastin was on 06/19/2018. Cycle # 50 FOLFIRI + Avastin was on 07/03/2018.   Cycle # 51 FOLFIRI + Avastin was on 07/24/2018. Cycle # 52 FOLFIRI + Avastin was on 08/14/2018. Cycle # 53 FOLFIRI + Avastin was on 08/28/2018. CT chest 09/06/2018 noted interval decrease in size of LUCY measuring up to 4 mm, suggestive of treatment response. No mediastinal or hilar LN  CT abdomen/pelvis 09/06/2018 Slight interval increase in size of a previously noted metastatic lesion within the right hepatic lobe. This may be related to differences in phase of enhancement compared to the most recent study from 5/31/2018, the lesion remains decreased in size compared to the more previous studies. No abdominal, retroperitoneal, or pelvic lymphadenopathy. Continue FOLFIRI + Avastin and repeat scans in 2 months. Cycle # 54 FOLFIRI + Avastin was on 09/11/2018. Cycle # 55 FOLFIRI + Avastin was on 09/25/2018. Cycle # 56 FOLFIRI + Avastin was on 10/09/2018. Cycle # 57 FOLFIRI + Avastin was on 10/23/2018. CT chest 11/02/2018 stable 3 mm nodule within LUCY. No new right and left lung nodule. No mediastinal or osseous lesion. CT abdomen/pelvis 11/02/2018 stable metastatic disease to liver. No new metastatic disease identified. No mesenteric or retroperitoneal LN. No gross colonic lesion identified. Continue FOLFIRI + Avastin and repeat scans in 3 months. Cycle # 58 FOLFIRI + Avastin was on 11/06/2018. CEA 7.6 on 11/05/2018. Cycle # 59 FOLFIRI + Avastin was on 11/27/2018. CEA 6.9 on 11/26/2018. Review of Systems;  CONSTITUTIONAL: No fever, chills. Fair appetite and energy level. ENMT: Eyes: no diplopia; Nose: No epistaxis. Mouth: No oral lesions. RESPIRATORY: No hemoptysis, shortness of breath, cough. CARDIOVASCULAR: No chest pain, palpitations. GASTROINTESTINAL: No nausea/vomiting, abdominal pain. GENITOURINARY: No dysuria, urinary frequency, hematuria. NEURO: No syncope, presyncope, headache. Remainder ROS: Negative.     Past Medical History:   Past Medical History               Diagnosis Date    Arthritis   prominent ntestinal metaplasia (Mad[place]'s epithelium), negative for epithelial dysplasia, esophageal squamous mucosa not identified     Cycle # 16 FOLFOX (discontinued avastin (bevacizumab) given association of peptic ulcer disease and known association of GI perforation.) was on 06/21/2016. CEA 47 on 06/21/2016. Cycle # 17 FOLFOX was on 07/05/2016. CEA 52.6 on 07/05/2016. CEA 47.6 on 07/19/2016.     Re-staging scans 07/19/2106: CT Chest negative for metastatic disease. CT Abdomen/Pelvis: Stable hepatic lesions; Question new mural thickening in the cecum. Bone Scan: New Let hip lesion suspicious for bone metastasis.     Increased CEA; new mural thickening in the cecum and new left hip lesion suspicious for bone metastasis are consistent with disease progression; He derived maximum benefit from FOLFOX/AVASTIN. D/C FOLFOX/AVASTIN. We recommended FOLFIRI second line therapy. Cycle # 1 FOLFIRI was on 08/02/2016. CEA 40.8 on 08/02/2016. Xgeva q4 weeks started on 08/02/2016. Cycle # 2 FOLFIRI was on 08/16/2016. CEA 31.7 on 08/16/2016. Cycle # 3 FOLFIRI (added Avastin) was on 08/30/2016 given that ulcers healed on EGD 08/15/2016 by Dr. Grazyna Groves; Protonix bid since avastin re-started. Colonoscopy on 08/29/2016 (to look at the cecal area) unremarkable. CEA 26.7 on 08/30/2016. Cycle # 4 FOLFIRI/Avastin was on 09/13/2016. CEA 23.4 on 09/13/2016. Cycle # 5 FOLFIRI/Avastin was on 09/27/2016. CEA 22.3 on 09/27/2016. Cycle # 6 FOLFIRI/Avastin was on 10/11/2016. CEA 18.4 on 10/11/2016.      Bone scan 10/21/2016 stable bone metastasis; ? New lesion anterior left sixth rib likely interval healing fracture. CT abdomen/pelvis revealed stable hepatic metastasis. CT chest negative for metastatic disease. Continue FOLFIRI/Avastin and repeat scans in 3 months. Cycle # 7 FOLFIRI/Avastin was on 10/25/2016. CEA 16.1 on 10/25/2016. Cycle # 8 FOLFIRI/Avastin was on 11/08/2016. CEA 15.7 on 11/08/2016.    Cycle # 9 06/05/2018. Cycle # 49 FOLFIRI + Avastin was on 06/19/2018. CEA 6.1 on 06/19/2018. Cycle # 50 FOLFIRI + Avastin was on 07/03/2018. CEA 7.4 on 07/03/2018. Cycle # 51 FOLFIRI + Avastin was on 07/24/2018. CEA 6.7 on 07/24/2018. Cycle # 52 FOLFIRI + Avastin was on 08/14/2018. CEA 6.8 on 08/14/2018. Cycle # 53 FOLFIRI + Avastin was on 08/28/2018. CEA 6.5 on 08/27/2018. CT chest 09/06/2018 noted interval decrease in size of LUCY measuring up to 4 mm, suggestive of treatment response. No mediastinal or hilar LN  CT abdomen/pelvis 09/06/2018 Slight interval increase in size of a previously noted metastatic lesion within the right hepatic lobe. This may be related to differences in phase of enhancement compared to the most recent study from 5/31/2018, the lesion remains decreased in size compared to the more previous studies. No abdominal, retroperitoneal, or pelvic lymphadenopathy. Continue FOLFIRI + Avastin and repeat scans in 2 months. Cycle # 54 FOLFIRI + Avastin was on 09/11/2018. CEA 6.4 on 09/10/2018. Cycle # 55 FOLFIRI + Avastin was on 09/25/2018. CEA 6.4 on 09/25/2018. Cycle # 56 FOLFIRI + Avastin was on 10/09/2018. CEA 6.7 on 10/08/2018. Cycle # 57 FOLFIRI + Avastin was on 10/23/2018. CEA 7.2 on 10/22/2018. CT chest 11/02/2018 stable 3 mm nodule within LUCY. No new right and left lung nodule. No mediastinal or osseous lesion. CT abdomen/pelvis 11/02/2018 stable metastatic disease to liver. No new metastatic disease identified. No mesenteric or retroperitoneal LN. No gross colonic lesion identified. Continue FOLFIRI + Avastin and repeat scans in 3 months. Cycle # 58 FOLFIRI + Avastin was on 11/06/2018. CEA 7.6 on 11/05/2018. Cycle # 59 FOLFIRI + Avastin was on 11/27/2018. CEA 6.9 on 11/26/2018. Cycle # 60 FOLFIRI + Avastin is today 12/11/2018. CEA 6.7 on 12/11/2018. RTC in 2-3 weeks for Cycle # 61 FOLFIRI + Avastin.   MSI testing noted no mismatch repair protein loss of

## 2018-12-11 NOTE — PROGRESS NOTES
Received message from [facility] @ [facility] that they are unable to provide a 5FU pump for 1/2/19. Appointment changed to accommodate availability of 5FU pump. Dr. Tami Crook notified. Pt agreeable to plan of care.

## 2018-12-11 NOTE — PROGRESS NOTES
Patient presents to clinic today for Xgeva injection. Patient's labs monitored, specifically, Calcium level, to ensure no increased risk of hypocalcemia with administration of the medication. Patient's calcium level is 8.8 mg/dL. Patient received therapy and will continue to be monitored prior to each dose.     Reynaldo Mckinney, [address] 12/11/2018 9:22 AM

## 2018-12-13 ENCOUNTER — HOSPITAL ENCOUNTER (OUTPATIENT)
Dept: INFUSION THERAPY | Age: 68
Discharge: HOME | End: 2018-12-13
Payer: MEDICARE

## 2018-12-13 DIAGNOSIS — C20 RECTAL CANCER METASTASIZED TO LIVER (HCC): ICD-10-CM

## 2018-12-13 DIAGNOSIS — C78.7 RECTAL CANCER METASTASIZED TO LIVER (HCC): ICD-10-CM

## 2018-12-13 PROCEDURE — 6360000002 HC RX W HCPCS: Performed by: INTERNAL MEDICINE

## 2018-12-13 PROCEDURE — 96372 THER/PROPH/DIAG INJ SC/IM: CPT

## 2018-12-13 RX ADMIN — PEGFILGRASTIM 6 MG: 6 INJECTION SUBCUTANEOUS at 14:10

## 2019-01-07 ENCOUNTER — HOSPITAL ENCOUNTER (OUTPATIENT)
Age: 69
Discharge: HOME | End: 2019-01-07
Payer: MEDICARE

## 2019-01-07 LAB
ALBUMIN SERPL-MCNC: 3.6 G/DL (ref 3.5–5.2)
ALP BLD-CCNC: 129 U/L (ref 40–129)
ALT SERPL-CCNC: 20 U/L (ref 0–40)
ANION GAP SERPL CALCULATED.3IONS-SCNC: 8 MMOL/L (ref 7–16)
AST SERPL-CCNC: 30 U/L (ref 0–39)
BASOPHILS ABSOLUTE: 0 E9/L (ref 0–0.2)
BASOPHILS RELATIVE PERCENT: 1.3 % (ref 0–2)
BILIRUB SERPL-MCNC: 1.1 MG/DL (ref 0–1.2)
BUN BLDV-MCNC: 16 MG/DL (ref 8–23)
CALCIUM SERPL-MCNC: 9.2 MG/DL (ref 8.6–10.2)
CEA: 5.6 NG/ML (ref 0–5.2)
CHLORIDE BLD-SCNC: 106 MMOL/L (ref 98–107)
CO2: 27 MMOL/L (ref 22–29)
CREAT SERPL-MCNC: 1 MG/DL (ref 0.7–1.2)
EOSINOPHILS ABSOLUTE: 0.14 E9/L (ref 0.05–0.5)
EOSINOPHILS RELATIVE PERCENT: 3.6 % (ref 0–6)
GFR AFRICAN AMERICAN: >60
GFR NON-AFRICAN AMERICAN: >60 ML/MIN/1.73
GLUCOSE BLD-MCNC: 138 MG/DL (ref 74–99)
HCT VFR BLD CALC: 32.3 % (ref 37–54)
HEMOGLOBIN: 10.3 G/DL (ref 12.5–16.5)
LYMPHOCYTES ABSOLUTE: 0.44 E9/L (ref 1.5–4)
LYMPHOCYTES RELATIVE PERCENT: 10.7 % (ref 20–42)
MCH RBC QN AUTO: 32.6 PG (ref 26–35)
MCHC RBC AUTO-ENTMCNC: 31.9 % (ref 32–34.5)
MCV RBC AUTO: 102.2 FL (ref 80–99.9)
MONOCYTES ABSOLUTE: 0.32 E9/L (ref 0.1–0.95)
MONOCYTES RELATIVE PERCENT: 8 % (ref 2–12)
NEUTROPHILS ABSOLUTE: 3.12 E9/L (ref 1.8–7.3)
NEUTROPHILS RELATIVE PERCENT: 77.7 % (ref 43–80)
PDW BLD-RTO: 16.6 FL (ref 11.5–15)
PLATELET # BLD: 96 E9/L (ref 130–450)
PLATELET CONFIRMATION: NORMAL
PMV BLD AUTO: 9.7 FL (ref 7–12)
POTASSIUM SERPL-SCNC: 4.5 MMOL/L (ref 3.5–5)
RBC # BLD: 3.16 E12/L (ref 3.8–5.8)
RBC # BLD: NORMAL 10*6/UL
SODIUM BLD-SCNC: 141 MMOL/L (ref 132–146)
TOTAL PROTEIN: 6.4 G/DL (ref 6.4–8.3)
WBC # BLD: 4 E9/L (ref 4.5–11.5)

## 2019-01-07 PROCEDURE — 36415 COLL VENOUS BLD VENIPUNCTURE: CPT

## 2019-01-07 PROCEDURE — 82378 CARCINOEMBRYONIC ANTIGEN: CPT

## 2019-01-07 PROCEDURE — 80053 COMPREHEN METABOLIC PANEL: CPT

## 2019-01-07 PROCEDURE — 85025 COMPLETE CBC W/AUTO DIFF WBC: CPT

## 2019-01-08 ENCOUNTER — OFFICE VISIT (OUTPATIENT)
Dept: ONCOLOGY | Age: 69
End: 2019-01-08
Payer: MEDICARE

## 2019-01-08 ENCOUNTER — HOSPITAL ENCOUNTER (OUTPATIENT)
Dept: INFUSION THERAPY | Age: 69
Discharge: HOME | End: 2019-01-08
Payer: MEDICARE

## 2019-01-08 VITALS
TEMPERATURE: 98.3 F | RESPIRATION RATE: 20 BRPM | SYSTOLIC BLOOD PRESSURE: 122 MMHG | WEIGHT: 200.4 LBS | HEART RATE: 71 BPM | HEIGHT: 73 IN | DIASTOLIC BLOOD PRESSURE: 72 MMHG | BODY MASS INDEX: 26.56 KG/M2

## 2019-01-08 VITALS — DIASTOLIC BLOOD PRESSURE: 67 MMHG | RESPIRATION RATE: 20 BRPM | SYSTOLIC BLOOD PRESSURE: 118 MMHG | HEART RATE: 66 BPM

## 2019-01-08 DIAGNOSIS — C79.51 BONE METASTASIS (HCC): ICD-10-CM

## 2019-01-08 DIAGNOSIS — C78.7 RECTAL CANCER METASTASIZED TO LIVER (HCC): ICD-10-CM

## 2019-01-08 DIAGNOSIS — C20 RECTAL CANCER METASTASIZED TO LIVER (HCC): ICD-10-CM

## 2019-01-08 DIAGNOSIS — C78.7 RECTAL CANCER METASTASIZED TO LIVER (HCC): Primary | ICD-10-CM

## 2019-01-08 DIAGNOSIS — C20 RECTAL CANCER (HCC): Primary | ICD-10-CM

## 2019-01-08 DIAGNOSIS — C20 RECTAL CANCER METASTASIZED TO LIVER (HCC): Primary | ICD-10-CM

## 2019-01-08 PROCEDURE — 96415 CHEMO IV INFUSION ADDL HR: CPT

## 2019-01-08 PROCEDURE — 99214 OFFICE O/P EST MOD 30 MIN: CPT | Performed by: INTERNAL MEDICINE

## 2019-01-08 PROCEDURE — 96411 CHEMO IV PUSH ADDL DRUG: CPT

## 2019-01-08 PROCEDURE — 96368 THER/DIAG CONCURRENT INF: CPT

## 2019-01-08 PROCEDURE — 96417 CHEMO IV INFUS EACH ADDL SEQ: CPT

## 2019-01-08 PROCEDURE — 96413 CHEMO IV INFUSION 1 HR: CPT

## 2019-01-08 PROCEDURE — 96375 TX/PRO/DX INJ NEW DRUG ADDON: CPT

## 2019-01-08 PROCEDURE — 2580000003 HC RX 258: Performed by: INTERNAL MEDICINE

## 2019-01-08 PROCEDURE — 6360000002 HC RX W HCPCS: Performed by: INTERNAL MEDICINE

## 2019-01-08 RX ORDER — SODIUM CHLORIDE 0.9 % (FLUSH) 0.9 %
10 SYRINGE (ML) INJECTION PRN
Status: CANCELLED | OUTPATIENT
Start: 2019-01-08

## 2019-01-08 RX ORDER — SODIUM CHLORIDE 9 MG/ML
250 INJECTION, SOLUTION INTRAVENOUS CONTINUOUS
Status: CANCELLED | OUTPATIENT
Start: 2019-01-08 | End: 2019-01-26

## 2019-01-08 RX ORDER — METHYLPREDNISOLONE SODIUM SUCCINATE 125 MG/2ML
125 INJECTION, POWDER, LYOPHILIZED, FOR SOLUTION INTRAMUSCULAR; INTRAVENOUS ONCE
Status: CANCELLED | OUTPATIENT
Start: 2019-01-08 | End: 2019-01-08

## 2019-01-08 RX ORDER — DEXAMETHASONE SODIUM PHOSPHATE 10 MG/ML
10 INJECTION, SOLUTION INTRAMUSCULAR; INTRAVENOUS ONCE
Status: CANCELLED | OUTPATIENT
Start: 2019-01-08

## 2019-01-08 RX ORDER — PALONOSETRON 0.05 MG/ML
0.25 INJECTION, SOLUTION INTRAVENOUS ONCE
Status: CANCELLED | OUTPATIENT
Start: 2019-01-08

## 2019-01-08 RX ORDER — FLUOROURACIL 50 MG/ML
850 INJECTION, SOLUTION INTRAVENOUS ONCE
Status: CANCELLED | OUTPATIENT
Start: 2019-01-08

## 2019-01-08 RX ORDER — PALONOSETRON 0.05 MG/ML
0.25 INJECTION, SOLUTION INTRAVENOUS ONCE
Status: COMPLETED | OUTPATIENT
Start: 2019-01-08 | End: 2019-01-08

## 2019-01-08 RX ORDER — DEXAMETHASONE SODIUM PHOSPHATE 100 MG/10ML
10 INJECTION INTRAMUSCULAR; INTRAVENOUS ONCE
Status: COMPLETED | OUTPATIENT
Start: 2019-01-08 | End: 2019-01-08

## 2019-01-08 RX ORDER — EPINEPHRINE 1 MG/ML
0.3 INJECTION, SOLUTION, CONCENTRATE INTRAVENOUS PRN
Status: CANCELLED | OUTPATIENT
Start: 2019-01-08

## 2019-01-08 RX ORDER — SODIUM CHLORIDE 9 MG/ML
250 INJECTION, SOLUTION INTRAVENOUS CONTINUOUS
Status: DISCONTINUED | OUTPATIENT
Start: 2019-01-08 | End: 2019-01-09 | Stop reason: HOSPADM

## 2019-01-08 RX ORDER — FLUOROURACIL 50 MG/ML
850 INJECTION, SOLUTION INTRAVENOUS ONCE
Status: COMPLETED | OUTPATIENT
Start: 2019-01-08 | End: 2019-01-08

## 2019-01-08 RX ORDER — HEPARIN SODIUM (PORCINE) LOCK FLUSH IV SOLN 100 UNIT/ML 100 UNIT/ML
500 SOLUTION INTRAVENOUS PRN
Status: CANCELLED | OUTPATIENT
Start: 2019-01-08

## 2019-01-08 RX ORDER — ATROPINE SULFATE 0.4 MG/ML
0.4 AMPUL (ML) INJECTION ONCE
Status: COMPLETED | OUTPATIENT
Start: 2019-01-08 | End: 2019-01-08

## 2019-01-08 RX ORDER — SODIUM CHLORIDE 9 MG/ML
INJECTION, SOLUTION INTRAVENOUS CONTINUOUS
Status: CANCELLED | OUTPATIENT
Start: 2019-01-08

## 2019-01-08 RX ORDER — ATROPINE SULFATE 0.4 MG/ML
0.4 AMPUL (ML) INJECTION ONCE
Status: CANCELLED | OUTPATIENT
Start: 2019-01-08 | End: 2019-01-08

## 2019-01-08 RX ORDER — 0.9 % SODIUM CHLORIDE 0.9 %
10 VIAL (ML) INJECTION ONCE
Status: CANCELLED | OUTPATIENT
Start: 2019-01-08 | End: 2019-01-08

## 2019-01-08 RX ORDER — DIPHENHYDRAMINE HYDROCHLORIDE 50 MG/ML
50 INJECTION INTRAMUSCULAR; INTRAVENOUS ONCE
Status: CANCELLED | OUTPATIENT
Start: 2019-01-08 | End: 2019-01-08

## 2019-01-08 RX ADMIN — FLUOROURACIL 850 MG: 50 INJECTION, SOLUTION INTRAVENOUS at 11:16

## 2019-01-08 RX ADMIN — SODIUM CHLORIDE 400 MG: 9 INJECTION, SOLUTION INTRAVENOUS at 09:31

## 2019-01-08 RX ADMIN — SODIUM CHLORIDE 250 ML: 9 INJECTION, SOLUTION INTRAVENOUS at 08:46

## 2019-01-08 RX ADMIN — DEXAMETHASONE SODIUM PHOSPHATE 10 MG: 10 INJECTION, SOLUTION INTRAMUSCULAR; INTRAVENOUS at 09:23

## 2019-01-08 RX ADMIN — ATROPINE SULFATE 0.4 MG: 0.4 INJECTION, SOLUTION INTRAMUSCULAR; INTRAVENOUS; SUBCUTANEOUS at 09:23

## 2019-01-08 RX ADMIN — PALONOSETRON 0.25 MG: 0.05 INJECTION, SOLUTION INTRAVENOUS at 09:23

## 2019-01-08 RX ADMIN — BEVACIZUMAB 400 MG: 400 INJECTION, SOLUTION INTRAVENOUS at 08:46

## 2019-01-08 RX ADMIN — LEUCOVORIN CALCIUM 850 MG: 200 INJECTION, POWDER, LYOPHILIZED, FOR SOLUTION INTRAMUSCULAR; INTRAVENOUS at 09:25

## 2019-01-08 NOTE — PROGRESS NOTES
Pt presents to clinic for anti cancer therapy. Pt tolerated treatment well without complications. Pt discharged ambulatory in stable condition.

## 2019-01-10 ENCOUNTER — HOSPITAL ENCOUNTER (OUTPATIENT)
Dept: INFUSION THERAPY | Age: 69
Discharge: HOME | End: 2019-01-10
Payer: MEDICARE

## 2019-01-10 DIAGNOSIS — C20 RECTAL CANCER METASTASIZED TO LIVER (HCC): ICD-10-CM

## 2019-01-10 DIAGNOSIS — C78.7 RECTAL CANCER METASTASIZED TO LIVER (HCC): ICD-10-CM

## 2019-01-10 PROCEDURE — 96372 THER/PROPH/DIAG INJ SC/IM: CPT

## 2019-01-10 PROCEDURE — 6360000002 HC RX W HCPCS: Performed by: INTERNAL MEDICINE

## 2019-01-10 RX ADMIN — PEGFILGRASTIM 6 MG: 6 INJECTION SUBCUTANEOUS at 13:27

## 2019-01-17 ENCOUNTER — CLINICAL DOCUMENTATION (OUTPATIENT)
Dept: ONCOLOGY | Age: 69
End: 2019-01-17

## 2019-01-21 ENCOUNTER — HOSPITAL ENCOUNTER (OUTPATIENT)
Age: 69
Discharge: HOME | End: 2019-01-21
Payer: MEDICARE

## 2019-01-21 ENCOUNTER — CLINICAL DOCUMENTATION (OUTPATIENT)
Dept: ONCOLOGY | Age: 69
End: 2019-01-21

## 2019-01-21 DIAGNOSIS — C79.51 BONE METASTASIS (HCC): ICD-10-CM

## 2019-01-21 DIAGNOSIS — C78.7 RECTAL CANCER METASTASIZED TO LIVER (HCC): ICD-10-CM

## 2019-01-21 DIAGNOSIS — C20 RECTAL CANCER METASTASIZED TO LIVER (HCC): ICD-10-CM

## 2019-01-21 LAB
ALBUMIN SERPL-MCNC: 3.7 G/DL (ref 3.5–5.2)
ALP BLD-CCNC: 139 U/L (ref 40–129)
ALT SERPL-CCNC: 19 U/L (ref 0–40)
ANION GAP SERPL CALCULATED.3IONS-SCNC: 10 MMOL/L (ref 7–16)
AST SERPL-CCNC: 26 U/L (ref 0–39)
BASOPHILS ABSOLUTE: 0 E9/L (ref 0–0.2)
BASOPHILS RELATIVE PERCENT: 0.8 % (ref 0–2)
BILIRUB SERPL-MCNC: 0.7 MG/DL (ref 0–1.2)
BUN BLDV-MCNC: 12 MG/DL (ref 8–23)
CALCIUM SERPL-MCNC: 9 MG/DL (ref 8.6–10.2)
CEA: 5.4 NG/ML (ref 0–5.2)
CHLORIDE BLD-SCNC: 107 MMOL/L (ref 98–107)
CO2: 27 MMOL/L (ref 22–29)
CREAT SERPL-MCNC: 1.1 MG/DL (ref 0.7–1.2)
DOHLE BODIES: ABNORMAL
EOSINOPHILS ABSOLUTE: 0.15 E9/L (ref 0.05–0.5)
EOSINOPHILS RELATIVE PERCENT: 6.1 % (ref 0–6)
GFR AFRICAN AMERICAN: >60
GFR NON-AFRICAN AMERICAN: >60 ML/MIN/1.73
GLUCOSE BLD-MCNC: 125 MG/DL (ref 74–99)
HCT VFR BLD CALC: 29.4 % (ref 37–54)
HEMOGLOBIN: 9.3 G/DL (ref 12.5–16.5)
LYMPHOCYTES ABSOLUTE: 0.38 E9/L (ref 1.5–4)
LYMPHOCYTES RELATIVE PERCENT: 14.9 % (ref 20–42)
MCH RBC QN AUTO: 32.6 PG (ref 26–35)
MCHC RBC AUTO-ENTMCNC: 31.6 % (ref 32–34.5)
MCV RBC AUTO: 103.2 FL (ref 80–99.9)
MONOCYTES ABSOLUTE: 0.12 E9/L (ref 0.1–0.95)
MONOCYTES RELATIVE PERCENT: 5.3 % (ref 2–12)
MYELOCYTE PERCENT: 0.9 % (ref 0–0)
NEUTROPHILS ABSOLUTE: 1.85 E9/L (ref 1.8–7.3)
NEUTROPHILS RELATIVE PERCENT: 72.8 % (ref 43–80)
OVALOCYTES: ABNORMAL
PDW BLD-RTO: 15.8 FL (ref 11.5–15)
PLATELET # BLD: 87 E9/L (ref 130–450)
PLATELET CONFIRMATION: NORMAL
PMV BLD AUTO: 10 FL (ref 7–12)
POIKILOCYTES: ABNORMAL
POLYCHROMASIA: ABNORMAL
POTASSIUM SERPL-SCNC: 4.1 MMOL/L (ref 3.5–5)
RBC # BLD: 2.85 E12/L (ref 3.8–5.8)
SODIUM BLD-SCNC: 144 MMOL/L (ref 132–146)
TEAR DROP CELLS: ABNORMAL
TOTAL PROTEIN: 6.5 G/DL (ref 6.4–8.3)
TOXIC GRANULATION: ABNORMAL
WBC # BLD: 2.5 E9/L (ref 4.5–11.5)

## 2019-01-21 PROCEDURE — 85025 COMPLETE CBC W/AUTO DIFF WBC: CPT

## 2019-01-21 PROCEDURE — 82378 CARCINOEMBRYONIC ANTIGEN: CPT

## 2019-01-21 PROCEDURE — 36415 COLL VENOUS BLD VENIPUNCTURE: CPT

## 2019-01-21 PROCEDURE — 80053 COMPREHEN METABOLIC PANEL: CPT

## 2019-01-28 ENCOUNTER — HOSPITAL ENCOUNTER (OUTPATIENT)
Age: 69
Discharge: HOME | End: 2019-01-28
Payer: MEDICARE

## 2019-01-28 DIAGNOSIS — C79.51 BONE METASTASIS (HCC): ICD-10-CM

## 2019-01-28 DIAGNOSIS — C20 RECTAL CANCER METASTASIZED TO LIVER (HCC): ICD-10-CM

## 2019-01-28 DIAGNOSIS — C20 RECTAL CANCER METASTASIZED TO LIVER (HCC): Primary | ICD-10-CM

## 2019-01-28 DIAGNOSIS — C78.7 RECTAL CANCER METASTASIZED TO LIVER (HCC): ICD-10-CM

## 2019-01-28 DIAGNOSIS — C78.7 RECTAL CANCER METASTASIZED TO LIVER (HCC): Primary | ICD-10-CM

## 2019-01-28 LAB
ALBUMIN SERPL-MCNC: 3.8 G/DL (ref 3.5–5.2)
ALP BLD-CCNC: 137 U/L (ref 40–129)
ALT SERPL-CCNC: 20 U/L (ref 0–40)
ANION GAP SERPL CALCULATED.3IONS-SCNC: 11 MMOL/L (ref 7–16)
AST SERPL-CCNC: 35 U/L (ref 0–39)
BASOPHILS ABSOLUTE: 0.02 E9/L (ref 0–0.2)
BASOPHILS RELATIVE PERCENT: 0.5 % (ref 0–2)
BILIRUB SERPL-MCNC: 0.6 MG/DL (ref 0–1.2)
BUN BLDV-MCNC: 13 MG/DL (ref 8–23)
CALCIUM SERPL-MCNC: 9.5 MG/DL (ref 8.6–10.2)
CEA: 4.6 NG/ML (ref 0–5.2)
CHLORIDE BLD-SCNC: 104 MMOL/L (ref 98–107)
CO2: 26 MMOL/L (ref 22–29)
CREAT SERPL-MCNC: 1 MG/DL (ref 0.7–1.2)
EOSINOPHILS ABSOLUTE: 0.12 E9/L (ref 0.05–0.5)
EOSINOPHILS RELATIVE PERCENT: 2.8 % (ref 0–6)
GFR AFRICAN AMERICAN: >60
GFR NON-AFRICAN AMERICAN: >60 ML/MIN/1.73
GLUCOSE BLD-MCNC: 124 MG/DL (ref 74–99)
HCT VFR BLD CALC: 31.6 % (ref 37–54)
HEMOGLOBIN: 10.2 G/DL (ref 12.5–16.5)
IMMATURE GRANULOCYTES #: 0.02 E9/L
IMMATURE GRANULOCYTES %: 0.5 % (ref 0–5)
LYMPHOCYTES ABSOLUTE: 0.55 E9/L (ref 1.5–4)
LYMPHOCYTES RELATIVE PERCENT: 13 % (ref 20–42)
MCH RBC QN AUTO: 32.7 PG (ref 26–35)
MCHC RBC AUTO-ENTMCNC: 32.3 % (ref 32–34.5)
MCV RBC AUTO: 101.3 FL (ref 80–99.9)
MONOCYTES ABSOLUTE: 0.34 E9/L (ref 0.1–0.95)
MONOCYTES RELATIVE PERCENT: 8 % (ref 2–12)
NEUTROPHILS ABSOLUTE: 3.19 E9/L (ref 1.8–7.3)
NEUTROPHILS RELATIVE PERCENT: 75.2 % (ref 43–80)
PDW BLD-RTO: 15.8 FL (ref 11.5–15)
PLATELET # BLD: 86 E9/L (ref 130–450)
PLATELET CONFIRMATION: NORMAL
PMV BLD AUTO: 10.8 FL (ref 7–12)
POTASSIUM SERPL-SCNC: 4.5 MMOL/L (ref 3.5–5)
RBC # BLD: 3.12 E12/L (ref 3.8–5.8)
RBC # BLD: NORMAL 10*6/UL
SODIUM BLD-SCNC: 141 MMOL/L (ref 132–146)
TOTAL PROTEIN: 6.6 G/DL (ref 6.4–8.3)
WBC # BLD: 4.2 E9/L (ref 4.5–11.5)

## 2019-01-28 PROCEDURE — 36415 COLL VENOUS BLD VENIPUNCTURE: CPT

## 2019-01-28 PROCEDURE — 82378 CARCINOEMBRYONIC ANTIGEN: CPT

## 2019-01-28 PROCEDURE — 85025 COMPLETE CBC W/AUTO DIFF WBC: CPT

## 2019-01-28 PROCEDURE — 80053 COMPREHEN METABOLIC PANEL: CPT

## 2019-01-29 ENCOUNTER — HOSPITAL ENCOUNTER (OUTPATIENT)
Dept: INFUSION THERAPY | Age: 69
Discharge: HOME | End: 2019-01-29
Payer: MEDICARE

## 2019-01-29 ENCOUNTER — OFFICE VISIT (OUTPATIENT)
Dept: ONCOLOGY | Age: 69
End: 2019-01-29
Payer: MEDICARE

## 2019-01-29 VITALS — SYSTOLIC BLOOD PRESSURE: 132 MMHG | RESPIRATION RATE: 18 BRPM | DIASTOLIC BLOOD PRESSURE: 79 MMHG | HEART RATE: 60 BPM

## 2019-01-29 VITALS
HEART RATE: 63 BPM | TEMPERATURE: 98.2 F | BODY MASS INDEX: 26.86 KG/M2 | DIASTOLIC BLOOD PRESSURE: 71 MMHG | SYSTOLIC BLOOD PRESSURE: 125 MMHG | HEIGHT: 73 IN | RESPIRATION RATE: 20 BRPM | WEIGHT: 202.7 LBS

## 2019-01-29 DIAGNOSIS — C20 RECTAL CANCER (HCC): Primary | ICD-10-CM

## 2019-01-29 DIAGNOSIS — C78.7 RECTAL CANCER METASTASIZED TO LIVER (HCC): ICD-10-CM

## 2019-01-29 DIAGNOSIS — C20 RECTAL CANCER METASTASIZED TO LIVER (HCC): ICD-10-CM

## 2019-01-29 PROCEDURE — 96417 CHEMO IV INFUS EACH ADDL SEQ: CPT

## 2019-01-29 PROCEDURE — 96413 CHEMO IV INFUSION 1 HR: CPT

## 2019-01-29 PROCEDURE — 99214 OFFICE O/P EST MOD 30 MIN: CPT | Performed by: INTERNAL MEDICINE

## 2019-01-29 PROCEDURE — 96415 CHEMO IV INFUSION ADDL HR: CPT

## 2019-01-29 PROCEDURE — 96375 TX/PRO/DX INJ NEW DRUG ADDON: CPT

## 2019-01-29 PROCEDURE — 96368 THER/DIAG CONCURRENT INF: CPT

## 2019-01-29 PROCEDURE — 2580000003 HC RX 258: Performed by: INTERNAL MEDICINE

## 2019-01-29 PROCEDURE — 6360000002 HC RX W HCPCS: Performed by: INTERNAL MEDICINE

## 2019-01-29 PROCEDURE — 96411 CHEMO IV PUSH ADDL DRUG: CPT

## 2019-01-29 RX ORDER — SODIUM CHLORIDE 9 MG/ML
INJECTION, SOLUTION INTRAVENOUS CONTINUOUS
Status: CANCELLED | OUTPATIENT
Start: 2019-01-29

## 2019-01-29 RX ORDER — 0.9 % SODIUM CHLORIDE 0.9 %
10 VIAL (ML) INJECTION ONCE
Status: CANCELLED | OUTPATIENT
Start: 2019-01-29 | End: 2019-01-29

## 2019-01-29 RX ORDER — SODIUM CHLORIDE 0.9 % (FLUSH) 0.9 %
10 SYRINGE (ML) INJECTION PRN
Status: DISCONTINUED | OUTPATIENT
Start: 2019-01-29 | End: 2019-01-30 | Stop reason: HOSPADM

## 2019-01-29 RX ORDER — DEXAMETHASONE SODIUM PHOSPHATE 100 MG/10ML
10 INJECTION INTRAMUSCULAR; INTRAVENOUS ONCE
Status: COMPLETED | OUTPATIENT
Start: 2019-01-29 | End: 2019-01-29

## 2019-01-29 RX ORDER — ATROPINE SULFATE 0.4 MG/ML
0.4 AMPUL (ML) INJECTION ONCE
Status: CANCELLED | OUTPATIENT
Start: 2019-01-29 | End: 2019-01-29

## 2019-01-29 RX ORDER — HEPARIN SODIUM (PORCINE) LOCK FLUSH IV SOLN 100 UNIT/ML 100 UNIT/ML
500 SOLUTION INTRAVENOUS PRN
Status: CANCELLED | OUTPATIENT
Start: 2019-01-29

## 2019-01-29 RX ORDER — FLUOROURACIL 50 MG/ML
850 INJECTION, SOLUTION INTRAVENOUS ONCE
Status: COMPLETED | OUTPATIENT
Start: 2019-01-29 | End: 2019-01-29

## 2019-01-29 RX ORDER — PALONOSETRON 0.05 MG/ML
0.25 INJECTION, SOLUTION INTRAVENOUS ONCE
Status: CANCELLED | OUTPATIENT
Start: 2019-01-29

## 2019-01-29 RX ORDER — EPINEPHRINE 1 MG/ML
0.3 INJECTION, SOLUTION, CONCENTRATE INTRAVENOUS PRN
Status: CANCELLED | OUTPATIENT
Start: 2019-01-29

## 2019-01-29 RX ORDER — PALONOSETRON 0.05 MG/ML
0.25 INJECTION, SOLUTION INTRAVENOUS ONCE
Status: COMPLETED | OUTPATIENT
Start: 2019-01-29 | End: 2019-01-29

## 2019-01-29 RX ORDER — SODIUM CHLORIDE 9 MG/ML
250 INJECTION, SOLUTION INTRAVENOUS CONTINUOUS
Status: DISCONTINUED | OUTPATIENT
Start: 2019-01-29 | End: 2019-01-30 | Stop reason: HOSPADM

## 2019-01-29 RX ORDER — SODIUM CHLORIDE 0.9 % (FLUSH) 0.9 %
10 SYRINGE (ML) INJECTION PRN
Status: CANCELLED | OUTPATIENT
Start: 2019-01-29

## 2019-01-29 RX ORDER — SODIUM CHLORIDE 9 MG/ML
250 INJECTION, SOLUTION INTRAVENOUS CONTINUOUS
Status: CANCELLED | OUTPATIENT
Start: 2019-01-29 | End: 2019-02-16

## 2019-01-29 RX ORDER — DIPHENHYDRAMINE HYDROCHLORIDE 50 MG/ML
50 INJECTION INTRAMUSCULAR; INTRAVENOUS ONCE
Status: CANCELLED | OUTPATIENT
Start: 2019-01-29 | End: 2019-01-29

## 2019-01-29 RX ORDER — FLUOROURACIL 50 MG/ML
850 INJECTION, SOLUTION INTRAVENOUS ONCE
Status: CANCELLED | OUTPATIENT
Start: 2019-01-29

## 2019-01-29 RX ORDER — DEXAMETHASONE SODIUM PHOSPHATE 10 MG/ML
10 INJECTION, SOLUTION INTRAMUSCULAR; INTRAVENOUS ONCE
Status: CANCELLED | OUTPATIENT
Start: 2019-01-29

## 2019-01-29 RX ORDER — METHYLPREDNISOLONE SODIUM SUCCINATE 125 MG/2ML
125 INJECTION, POWDER, LYOPHILIZED, FOR SOLUTION INTRAMUSCULAR; INTRAVENOUS ONCE
Status: CANCELLED | OUTPATIENT
Start: 2019-01-29 | End: 2019-01-29

## 2019-01-29 RX ADMIN — BEVACIZUMAB 400 MG: 400 INJECTION, SOLUTION INTRAVENOUS at 08:52

## 2019-01-29 RX ADMIN — SODIUM CHLORIDE 250 ML: 9 INJECTION, SOLUTION INTRAVENOUS at 08:47

## 2019-01-29 RX ADMIN — Medication 10 ML: at 09:33

## 2019-01-29 RX ADMIN — PALONOSETRON 0.25 MG: 0.05 INJECTION, SOLUTION INTRAVENOUS at 08:48

## 2019-01-29 RX ADMIN — DEXAMETHASONE SODIUM PHOSPHATE 10 MG: 10 INJECTION, SOLUTION INTRAMUSCULAR; INTRAVENOUS at 08:48

## 2019-01-29 RX ADMIN — FLUOROURACIL 850 MG: 50 INJECTION, SOLUTION INTRAVENOUS at 11:15

## 2019-01-29 RX ADMIN — SODIUM CHLORIDE 400 MG: 9 INJECTION, SOLUTION INTRAVENOUS at 09:35

## 2019-01-29 RX ADMIN — LEUCOVORIN CALCIUM 850 MG: 350 INJECTION, POWDER, LYOPHILIZED, FOR SOLUTION INTRAMUSCULAR; INTRAVENOUS at 09:34

## 2019-01-29 NOTE — PROGRESS NOTES
Spoke with Griselda Gomez regarding xgeva injection. 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 Explained that we will hold injection until work is completed and we have clearance from the dentist. He will notify us when dental work is complete.

## 2019-01-29 NOTE — PROGRESS NOTES
Pt presents to clinic for anti cancer treatment. Pt tolerated treatment well without complications. Pt was discharged ambulatory in stable condition.

## 2019-01-31 ENCOUNTER — HOSPITAL ENCOUNTER (OUTPATIENT)
Dept: INFUSION THERAPY | Age: 69
Discharge: HOME | End: 2019-01-31
Payer: MEDICARE

## 2019-01-31 DIAGNOSIS — C20 RECTAL CANCER METASTASIZED TO LIVER (HCC): ICD-10-CM

## 2019-01-31 DIAGNOSIS — C78.7 RECTAL CANCER METASTASIZED TO LIVER (HCC): ICD-10-CM

## 2019-01-31 PROCEDURE — 96372 THER/PROPH/DIAG INJ SC/IM: CPT

## 2019-01-31 PROCEDURE — 6360000002 HC RX W HCPCS: Performed by: INTERNAL MEDICINE

## 2019-01-31 RX ADMIN — PEGFILGRASTIM 6 MG: 6 INJECTION SUBCUTANEOUS at 13:30

## 2019-02-11 ENCOUNTER — HOSPITAL ENCOUNTER (OUTPATIENT)
Age: 69
Discharge: HOME | End: 2019-02-11
Payer: MEDICARE

## 2019-02-11 LAB
ALBUMIN SERPL-MCNC: 3.6 G/DL (ref 3.5–5.2)
ALP BLD-CCNC: 146 U/L (ref 40–129)
ALT SERPL-CCNC: 20 U/L (ref 0–40)
ANION GAP SERPL CALCULATED.3IONS-SCNC: 12 MMOL/L (ref 7–16)
ANISOCYTOSIS: ABNORMAL
AST SERPL-CCNC: 27 U/L (ref 0–39)
BASOPHILS ABSOLUTE: 0 E9/L (ref 0–0.2)
BASOPHILS RELATIVE PERCENT: 0.7 % (ref 0–2)
BILIRUB SERPL-MCNC: 0.6 MG/DL (ref 0–1.2)
BUN BLDV-MCNC: 13 MG/DL (ref 8–23)
CALCIUM SERPL-MCNC: 8.4 MG/DL (ref 8.6–10.2)
CEA: 4.3 NG/ML (ref 0–5.2)
CHLORIDE BLD-SCNC: 104 MMOL/L (ref 98–107)
CO2: 26 MMOL/L (ref 22–29)
CREAT SERPL-MCNC: 1 MG/DL (ref 0.7–1.2)
EOSINOPHILS ABSOLUTE: 0.13 E9/L (ref 0.05–0.5)
EOSINOPHILS RELATIVE PERCENT: 4.3 % (ref 0–6)
GFR AFRICAN AMERICAN: >60
GFR NON-AFRICAN AMERICAN: >60 ML/MIN/1.73
GLUCOSE FASTING: 118 MG/DL (ref 74–99)
HCT VFR BLD CALC: 29.2 % (ref 37–54)
HEMOGLOBIN: 9.6 G/DL (ref 12.5–16.5)
LYMPHOCYTES ABSOLUTE: 0.72 E9/L (ref 1.5–4)
LYMPHOCYTES RELATIVE PERCENT: 24.3 % (ref 20–42)
MCH RBC QN AUTO: 32.7 PG (ref 26–35)
MCHC RBC AUTO-ENTMCNC: 32.9 % (ref 32–34.5)
MCV RBC AUTO: 99.3 FL (ref 80–99.9)
MONOCYTES ABSOLUTE: 0.12 E9/L (ref 0.1–0.95)
MONOCYTES RELATIVE PERCENT: 4.3 % (ref 2–12)
NEUTROPHILS ABSOLUTE: 2.01 E9/L (ref 1.8–7.3)
NEUTROPHILS RELATIVE PERCENT: 67 % (ref 43–80)
OVALOCYTES: ABNORMAL
PDW BLD-RTO: 15.9 FL (ref 11.5–15)
PLATELET # BLD: 98 E9/L (ref 130–450)
PLATELET CONFIRMATION: NORMAL
PMV BLD AUTO: 9.6 FL (ref 7–12)
POIKILOCYTES: ABNORMAL
POLYCHROMASIA: ABNORMAL
POTASSIUM SERPL-SCNC: 3.3 MMOL/L (ref 3.5–5)
RBC # BLD: 2.94 E12/L (ref 3.8–5.8)
SCHISTOCYTES: ABNORMAL
SODIUM BLD-SCNC: 142 MMOL/L (ref 132–146)
TEAR DROP CELLS: ABNORMAL
TOTAL PROTEIN: 6.4 G/DL (ref 6.4–8.3)
TOXIC GRANULATION: ABNORMAL
WBC # BLD: 3 E9/L (ref 4.5–11.5)

## 2019-02-11 PROCEDURE — 36415 COLL VENOUS BLD VENIPUNCTURE: CPT

## 2019-02-11 PROCEDURE — 82378 CARCINOEMBRYONIC ANTIGEN: CPT

## 2019-02-11 PROCEDURE — 80053 COMPREHEN METABOLIC PANEL: CPT

## 2019-02-11 PROCEDURE — 85025 COMPLETE CBC W/AUTO DIFF WBC: CPT

## 2019-02-12 ENCOUNTER — TELEPHONE (OUTPATIENT)
Dept: ONCOLOGY | Age: 69
End: 2019-02-12

## 2019-02-12 ENCOUNTER — HOSPITAL ENCOUNTER (OUTPATIENT)
Dept: INFUSION THERAPY | Age: 69
Discharge: HOME | End: 2019-02-12
Payer: MEDICARE

## 2019-02-12 ENCOUNTER — OFFICE VISIT (OUTPATIENT)
Dept: ONCOLOGY | Age: 69
End: 2019-02-12
Payer: MEDICARE

## 2019-02-12 VITALS
RESPIRATION RATE: 20 BRPM | HEIGHT: 73 IN | DIASTOLIC BLOOD PRESSURE: 70 MMHG | SYSTOLIC BLOOD PRESSURE: 133 MMHG | TEMPERATURE: 98.1 F | BODY MASS INDEX: 26.89 KG/M2 | HEART RATE: 67 BPM | WEIGHT: 202.9 LBS

## 2019-02-12 VITALS — DIASTOLIC BLOOD PRESSURE: 74 MMHG | SYSTOLIC BLOOD PRESSURE: 127 MMHG | RESPIRATION RATE: 20 BRPM | HEART RATE: 68 BPM

## 2019-02-12 DIAGNOSIS — C20 RECTAL CANCER METASTASIZED TO LIVER (HCC): ICD-10-CM

## 2019-02-12 DIAGNOSIS — C78.7 RECTAL CANCER METASTASIZED TO LIVER (HCC): ICD-10-CM

## 2019-02-12 DIAGNOSIS — C20 RECTAL CANCER (HCC): Primary | ICD-10-CM

## 2019-02-12 PROCEDURE — 99214 OFFICE O/P EST MOD 30 MIN: CPT | Performed by: INTERNAL MEDICINE

## 2019-02-12 PROCEDURE — 96368 THER/DIAG CONCURRENT INF: CPT

## 2019-02-12 PROCEDURE — 6360000002 HC RX W HCPCS: Performed by: INTERNAL MEDICINE

## 2019-02-12 PROCEDURE — 96415 CHEMO IV INFUSION ADDL HR: CPT

## 2019-02-12 PROCEDURE — 96413 CHEMO IV INFUSION 1 HR: CPT

## 2019-02-12 PROCEDURE — 96417 CHEMO IV INFUS EACH ADDL SEQ: CPT

## 2019-02-12 PROCEDURE — 96411 CHEMO IV PUSH ADDL DRUG: CPT

## 2019-02-12 PROCEDURE — 2580000003 HC RX 258: Performed by: INTERNAL MEDICINE

## 2019-02-12 PROCEDURE — 96375 TX/PRO/DX INJ NEW DRUG ADDON: CPT

## 2019-02-12 RX ORDER — SODIUM CHLORIDE 9 MG/ML
INJECTION, SOLUTION INTRAVENOUS CONTINUOUS
Status: CANCELLED | OUTPATIENT
Start: 2019-02-12

## 2019-02-12 RX ORDER — SODIUM CHLORIDE 9 MG/ML
250 INJECTION, SOLUTION INTRAVENOUS CONTINUOUS
Status: DISCONTINUED | OUTPATIENT
Start: 2019-02-12 | End: 2019-02-13 | Stop reason: HOSPADM

## 2019-02-12 RX ORDER — DEXAMETHASONE SODIUM PHOSPHATE 100 MG/10ML
10 INJECTION INTRAMUSCULAR; INTRAVENOUS ONCE
Status: COMPLETED | OUTPATIENT
Start: 2019-02-12 | End: 2019-02-12

## 2019-02-12 RX ORDER — HEPARIN SODIUM (PORCINE) LOCK FLUSH IV SOLN 100 UNIT/ML 100 UNIT/ML
500 SOLUTION INTRAVENOUS PRN
Status: CANCELLED | OUTPATIENT
Start: 2019-02-12

## 2019-02-12 RX ORDER — 0.9 % SODIUM CHLORIDE 0.9 %
10 VIAL (ML) INJECTION ONCE
Status: CANCELLED | OUTPATIENT
Start: 2019-02-12 | End: 2019-02-12

## 2019-02-12 RX ORDER — METHYLPREDNISOLONE SODIUM SUCCINATE 125 MG/2ML
125 INJECTION, POWDER, LYOPHILIZED, FOR SOLUTION INTRAMUSCULAR; INTRAVENOUS ONCE
Status: CANCELLED | OUTPATIENT
Start: 2019-02-12 | End: 2019-02-12

## 2019-02-12 RX ORDER — FLUOROURACIL 50 MG/ML
850 INJECTION, SOLUTION INTRAVENOUS ONCE
Status: CANCELLED | OUTPATIENT
Start: 2019-02-12

## 2019-02-12 RX ORDER — EPINEPHRINE 1 MG/ML
0.3 INJECTION, SOLUTION, CONCENTRATE INTRAVENOUS PRN
Status: CANCELLED | OUTPATIENT
Start: 2019-02-12

## 2019-02-12 RX ORDER — FLUOROURACIL 50 MG/ML
850 INJECTION, SOLUTION INTRAVENOUS ONCE
Status: COMPLETED | OUTPATIENT
Start: 2019-02-12 | End: 2019-02-12

## 2019-02-12 RX ORDER — PALONOSETRON 0.05 MG/ML
0.25 INJECTION, SOLUTION INTRAVENOUS ONCE
Status: CANCELLED | OUTPATIENT
Start: 2019-02-12

## 2019-02-12 RX ORDER — SODIUM CHLORIDE 9 MG/ML
250 INJECTION, SOLUTION INTRAVENOUS CONTINUOUS
Status: CANCELLED | OUTPATIENT
Start: 2019-02-12 | End: 2019-03-02

## 2019-02-12 RX ORDER — DIPHENHYDRAMINE HYDROCHLORIDE 50 MG/ML
50 INJECTION INTRAMUSCULAR; INTRAVENOUS ONCE
Status: CANCELLED | OUTPATIENT
Start: 2019-02-12 | End: 2019-02-12

## 2019-02-12 RX ORDER — DEXAMETHASONE SODIUM PHOSPHATE 10 MG/ML
10 INJECTION, SOLUTION INTRAMUSCULAR; INTRAVENOUS ONCE
Status: CANCELLED | OUTPATIENT
Start: 2019-02-12

## 2019-02-12 RX ORDER — PALONOSETRON 0.05 MG/ML
0.25 INJECTION, SOLUTION INTRAVENOUS ONCE
Status: COMPLETED | OUTPATIENT
Start: 2019-02-12 | End: 2019-02-12

## 2019-02-12 RX ORDER — ATROPINE SULFATE 0.4 MG/ML
0.4 AMPUL (ML) INJECTION ONCE
Status: CANCELLED | OUTPATIENT
Start: 2019-02-12 | End: 2019-02-12

## 2019-02-12 RX ORDER — SODIUM CHLORIDE 0.9 % (FLUSH) 0.9 %
10 SYRINGE (ML) INJECTION PRN
Status: CANCELLED | OUTPATIENT
Start: 2019-02-12

## 2019-02-12 RX ORDER — ATROPINE SULFATE 0.4 MG/ML
0.4 AMPUL (ML) INJECTION ONCE
Status: COMPLETED | OUTPATIENT
Start: 2019-02-12 | End: 2019-02-12

## 2019-02-12 RX ADMIN — FLUOROURACIL 850 MG: 50 INJECTION, SOLUTION INTRAVENOUS at 12:06

## 2019-02-12 RX ADMIN — SODIUM CHLORIDE 400 MG: 9 INJECTION, SOLUTION INTRAVENOUS at 10:01

## 2019-02-12 RX ADMIN — LEUCOVORIN CALCIUM 850 MG: 350 INJECTION, POWDER, LYOPHILIZED, FOR SOLUTION INTRAMUSCULAR; INTRAVENOUS at 10:01

## 2019-02-12 RX ADMIN — BEVACIZUMAB 400 MG: 400 INJECTION, SOLUTION INTRAVENOUS at 09:17

## 2019-02-12 RX ADMIN — PALONOSETRON 0.25 MG: 0.05 INJECTION, SOLUTION INTRAVENOUS at 08:48

## 2019-02-12 RX ADMIN — ATROPINE SULFATE 0.4 MG: 0.4 INJECTION, SOLUTION INTRAMUSCULAR; INTRAVENOUS; SUBCUTANEOUS at 08:50

## 2019-02-12 RX ADMIN — DEXAMETHASONE SODIUM PHOSPHATE 10 MG: 10 INJECTION, SOLUTION INTRAMUSCULAR; INTRAVENOUS at 08:54

## 2019-02-12 RX ADMIN — SODIUM CHLORIDE 250 ML: 9 INJECTION, SOLUTION INTRAVENOUS at 08:44

## 2019-02-14 ENCOUNTER — HOSPITAL ENCOUNTER (OUTPATIENT)
Dept: INFUSION THERAPY | Age: 69
Discharge: HOME | End: 2019-02-14
Payer: MEDICARE

## 2019-02-14 DIAGNOSIS — C20 RECTAL CANCER METASTASIZED TO LIVER (HCC): ICD-10-CM

## 2019-02-14 DIAGNOSIS — C78.7 RECTAL CANCER METASTASIZED TO LIVER (HCC): ICD-10-CM

## 2019-02-14 PROCEDURE — 6360000002 HC RX W HCPCS: Performed by: INTERNAL MEDICINE

## 2019-02-14 PROCEDURE — 96372 THER/PROPH/DIAG INJ SC/IM: CPT

## 2019-02-14 RX ADMIN — PEGFILGRASTIM 6 MG: 6 INJECTION SUBCUTANEOUS at 13:53

## 2019-02-25 ENCOUNTER — HOSPITAL ENCOUNTER (OUTPATIENT)
Age: 69
Discharge: HOME | End: 2019-02-25
Payer: MEDICARE

## 2019-02-25 LAB
ALBUMIN SERPL-MCNC: 3.5 G/DL (ref 3.5–5.2)
ALP BLD-CCNC: 147 U/L (ref 40–129)
ALT SERPL-CCNC: 25 U/L (ref 0–40)
ANION GAP SERPL CALCULATED.3IONS-SCNC: 10 MMOL/L (ref 7–16)
AST SERPL-CCNC: 37 U/L (ref 0–39)
BASOPHILS ABSOLUTE: 0.03 E9/L (ref 0–0.2)
BASOPHILS RELATIVE PERCENT: 1.1 % (ref 0–2)
BILIRUB SERPL-MCNC: 0.8 MG/DL (ref 0–1.2)
BUN BLDV-MCNC: 13 MG/DL (ref 8–23)
CALCIUM SERPL-MCNC: 9.5 MG/DL (ref 8.6–10.2)
CEA: 4.7 NG/ML (ref 0–5.2)
CHLORIDE BLD-SCNC: 106 MMOL/L (ref 98–107)
CO2: 26 MMOL/L (ref 22–29)
CREAT SERPL-MCNC: 0.9 MG/DL (ref 0.7–1.2)
EOSINOPHILS ABSOLUTE: 0.05 E9/L (ref 0.05–0.5)
EOSINOPHILS RELATIVE PERCENT: 1.8 % (ref 0–6)
GFR AFRICAN AMERICAN: >60
GFR NON-AFRICAN AMERICAN: >60 ML/MIN/1.73
GLUCOSE BLD-MCNC: 114 MG/DL (ref 74–99)
HCT VFR BLD CALC: 30.5 % (ref 37–54)
HEMOGLOBIN: 9.8 G/DL (ref 12.5–16.5)
IMMATURE GRANULOCYTES #: 0.03 E9/L
IMMATURE GRANULOCYTES %: 1.1 % (ref 0–5)
LYMPHOCYTES ABSOLUTE: 0.68 E9/L (ref 1.5–4)
LYMPHOCYTES RELATIVE PERCENT: 23.9 % (ref 20–42)
MCH RBC QN AUTO: 32.2 PG (ref 26–35)
MCHC RBC AUTO-ENTMCNC: 32.1 % (ref 32–34.5)
MCV RBC AUTO: 100.3 FL (ref 80–99.9)
MONOCYTES ABSOLUTE: 0.22 E9/L (ref 0.1–0.95)
MONOCYTES RELATIVE PERCENT: 7.7 % (ref 2–12)
NEUTROPHILS ABSOLUTE: 1.84 E9/L (ref 1.8–7.3)
NEUTROPHILS RELATIVE PERCENT: 64.4 % (ref 43–80)
PDW BLD-RTO: 16.1 FL (ref 11.5–15)
PLATELET # BLD: 110 E9/L (ref 130–450)
PMV BLD AUTO: 10.5 FL (ref 7–12)
POTASSIUM SERPL-SCNC: 3.7 MMOL/L (ref 3.5–5)
RBC # BLD: 3.04 E12/L (ref 3.8–5.8)
SODIUM BLD-SCNC: 142 MMOL/L (ref 132–146)
TOTAL PROTEIN: 6.3 G/DL (ref 6.4–8.3)
WBC # BLD: 2.9 E9/L (ref 4.5–11.5)

## 2019-02-25 PROCEDURE — 80053 COMPREHEN METABOLIC PANEL: CPT

## 2019-02-25 PROCEDURE — 36415 COLL VENOUS BLD VENIPUNCTURE: CPT

## 2019-02-25 PROCEDURE — 82378 CARCINOEMBRYONIC ANTIGEN: CPT

## 2019-02-25 PROCEDURE — 85025 COMPLETE CBC W/AUTO DIFF WBC: CPT

## 2019-02-26 ENCOUNTER — HOSPITAL ENCOUNTER (OUTPATIENT)
Dept: INFUSION THERAPY | Age: 69
Discharge: HOME | End: 2019-02-26
Payer: MEDICARE

## 2019-02-26 ENCOUNTER — OFFICE VISIT (OUTPATIENT)
Dept: ONCOLOGY | Age: 69
End: 2019-02-26
Payer: MEDICARE

## 2019-02-26 VITALS — DIASTOLIC BLOOD PRESSURE: 74 MMHG | SYSTOLIC BLOOD PRESSURE: 120 MMHG | RESPIRATION RATE: 20 BRPM | HEART RATE: 61 BPM

## 2019-02-26 VITALS
DIASTOLIC BLOOD PRESSURE: 69 MMHG | BODY MASS INDEX: 26.37 KG/M2 | RESPIRATION RATE: 20 BRPM | HEART RATE: 64 BPM | TEMPERATURE: 98.3 F | SYSTOLIC BLOOD PRESSURE: 108 MMHG | HEIGHT: 73 IN | WEIGHT: 199 LBS

## 2019-02-26 DIAGNOSIS — C78.7 RECTAL CANCER METASTASIZED TO LIVER (HCC): Primary | ICD-10-CM

## 2019-02-26 DIAGNOSIS — C20 RECTAL CANCER METASTASIZED TO LIVER (HCC): ICD-10-CM

## 2019-02-26 DIAGNOSIS — C20 RECTAL CANCER METASTASIZED TO LIVER (HCC): Primary | ICD-10-CM

## 2019-02-26 DIAGNOSIS — C78.7 RECTAL CANCER METASTASIZED TO LIVER (HCC): ICD-10-CM

## 2019-02-26 PROCEDURE — 96368 THER/DIAG CONCURRENT INF: CPT

## 2019-02-26 PROCEDURE — 2580000003 HC RX 258: Performed by: INTERNAL MEDICINE

## 2019-02-26 PROCEDURE — 99214 OFFICE O/P EST MOD 30 MIN: CPT | Performed by: INTERNAL MEDICINE

## 2019-02-26 PROCEDURE — 96375 TX/PRO/DX INJ NEW DRUG ADDON: CPT

## 2019-02-26 PROCEDURE — 96417 CHEMO IV INFUS EACH ADDL SEQ: CPT

## 2019-02-26 PROCEDURE — 96411 CHEMO IV PUSH ADDL DRUG: CPT

## 2019-02-26 PROCEDURE — 96413 CHEMO IV INFUSION 1 HR: CPT

## 2019-02-26 PROCEDURE — 96415 CHEMO IV INFUSION ADDL HR: CPT

## 2019-02-26 PROCEDURE — 6360000002 HC RX W HCPCS: Performed by: INTERNAL MEDICINE

## 2019-02-26 RX ORDER — ATROPINE SULFATE 0.4 MG/ML
0.4 AMPUL (ML) INJECTION ONCE
Status: COMPLETED | OUTPATIENT
Start: 2019-02-26 | End: 2019-02-26

## 2019-02-26 RX ORDER — SODIUM CHLORIDE 9 MG/ML
250 INJECTION, SOLUTION INTRAVENOUS CONTINUOUS
Status: CANCELLED | OUTPATIENT
Start: 2019-02-26 | End: 2019-03-16

## 2019-02-26 RX ORDER — SODIUM CHLORIDE 0.9 % (FLUSH) 0.9 %
10 SYRINGE (ML) INJECTION PRN
Status: CANCELLED | OUTPATIENT
Start: 2019-02-26

## 2019-02-26 RX ORDER — METHYLPREDNISOLONE SODIUM SUCCINATE 125 MG/2ML
125 INJECTION, POWDER, LYOPHILIZED, FOR SOLUTION INTRAMUSCULAR; INTRAVENOUS ONCE
Status: CANCELLED | OUTPATIENT
Start: 2019-02-26 | End: 2019-02-26

## 2019-02-26 RX ORDER — PALONOSETRON 0.05 MG/ML
0.25 INJECTION, SOLUTION INTRAVENOUS ONCE
Status: COMPLETED | OUTPATIENT
Start: 2019-02-26 | End: 2019-02-26

## 2019-02-26 RX ORDER — DEXAMETHASONE SODIUM PHOSPHATE 100 MG/10ML
10 INJECTION INTRAMUSCULAR; INTRAVENOUS ONCE
Status: COMPLETED | OUTPATIENT
Start: 2019-02-26 | End: 2019-02-26

## 2019-02-26 RX ORDER — SODIUM CHLORIDE 9 MG/ML
250 INJECTION, SOLUTION INTRAVENOUS CONTINUOUS
Status: DISCONTINUED | OUTPATIENT
Start: 2019-02-26 | End: 2019-02-27 | Stop reason: HOSPADM

## 2019-02-26 RX ORDER — DEXAMETHASONE SODIUM PHOSPHATE 10 MG/ML
10 INJECTION, SOLUTION INTRAMUSCULAR; INTRAVENOUS ONCE
Status: CANCELLED | OUTPATIENT
Start: 2019-02-26

## 2019-02-26 RX ORDER — ATROPINE SULFATE 0.4 MG/ML
0.4 AMPUL (ML) INJECTION ONCE
Status: CANCELLED | OUTPATIENT
Start: 2019-02-26 | End: 2019-02-26

## 2019-02-26 RX ORDER — DIPHENHYDRAMINE HYDROCHLORIDE 50 MG/ML
50 INJECTION INTRAMUSCULAR; INTRAVENOUS ONCE
Status: CANCELLED | OUTPATIENT
Start: 2019-02-26 | End: 2019-02-26

## 2019-02-26 RX ORDER — PALONOSETRON 0.05 MG/ML
0.25 INJECTION, SOLUTION INTRAVENOUS ONCE
Status: CANCELLED | OUTPATIENT
Start: 2019-02-26

## 2019-02-26 RX ORDER — FLUOROURACIL 50 MG/ML
850 INJECTION, SOLUTION INTRAVENOUS ONCE
Status: CANCELLED | OUTPATIENT
Start: 2019-02-26

## 2019-02-26 RX ORDER — HEPARIN SODIUM (PORCINE) LOCK FLUSH IV SOLN 100 UNIT/ML 100 UNIT/ML
500 SOLUTION INTRAVENOUS PRN
Status: CANCELLED | OUTPATIENT
Start: 2019-02-26

## 2019-02-26 RX ORDER — 0.9 % SODIUM CHLORIDE 0.9 %
10 VIAL (ML) INJECTION ONCE
Status: CANCELLED | OUTPATIENT
Start: 2019-02-26 | End: 2019-02-26

## 2019-02-26 RX ORDER — FLUOROURACIL 50 MG/ML
850 INJECTION, SOLUTION INTRAVENOUS ONCE
Status: COMPLETED | OUTPATIENT
Start: 2019-02-26 | End: 2019-02-26

## 2019-02-26 RX ORDER — SODIUM CHLORIDE 9 MG/ML
INJECTION, SOLUTION INTRAVENOUS CONTINUOUS
Status: CANCELLED | OUTPATIENT
Start: 2019-02-26

## 2019-02-26 RX ORDER — EPINEPHRINE 1 MG/ML
0.3 INJECTION, SOLUTION, CONCENTRATE INTRAVENOUS PRN
Status: CANCELLED | OUTPATIENT
Start: 2019-02-26

## 2019-02-26 RX ADMIN — ATROPINE SULFATE 0.4 MG: 0.4 INJECTION, SOLUTION INTRAMUSCULAR; INTRAVENOUS; SUBCUTANEOUS at 08:57

## 2019-02-26 RX ADMIN — FLUOROURACIL 850 MG: 50 INJECTION, SOLUTION INTRAVENOUS at 11:52

## 2019-02-26 RX ADMIN — DEXAMETHASONE SODIUM PHOSPHATE 10 MG: 10 INJECTION, SOLUTION INTRAMUSCULAR; INTRAVENOUS at 09:05

## 2019-02-26 RX ADMIN — PALONOSETRON 0.25 MG: 0.05 INJECTION, SOLUTION INTRAVENOUS at 08:55

## 2019-02-26 RX ADMIN — SODIUM CHLORIDE 250 ML: 9 INJECTION, SOLUTION INTRAVENOUS at 08:52

## 2019-02-26 RX ADMIN — LEUCOVORIN CALCIUM 850 MG: 350 INJECTION, POWDER, LYOPHILIZED, FOR SOLUTION INTRAMUSCULAR; INTRAVENOUS at 10:09

## 2019-02-26 RX ADMIN — SODIUM CHLORIDE 400 MG: 9 INJECTION, SOLUTION INTRAVENOUS at 10:09

## 2019-02-26 RX ADMIN — BEVACIZUMAB 400 MG: 400 INJECTION, SOLUTION INTRAVENOUS at 09:25

## 2019-02-28 ENCOUNTER — HOSPITAL ENCOUNTER (OUTPATIENT)
Dept: INFUSION THERAPY | Age: 69
Discharge: HOME | End: 2019-02-28
Payer: MEDICARE

## 2019-02-28 DIAGNOSIS — C78.7 RECTAL CANCER METASTASIZED TO LIVER (HCC): ICD-10-CM

## 2019-02-28 DIAGNOSIS — C20 RECTAL CANCER METASTASIZED TO LIVER (HCC): ICD-10-CM

## 2019-02-28 PROCEDURE — 6360000002 HC RX W HCPCS: Performed by: INTERNAL MEDICINE

## 2019-02-28 PROCEDURE — 96372 THER/PROPH/DIAG INJ SC/IM: CPT

## 2019-02-28 RX ADMIN — PEGFILGRASTIM 6 MG: 6 INJECTION SUBCUTANEOUS at 14:13

## 2019-03-08 ENCOUNTER — TELEPHONE (OUTPATIENT)
Dept: INFUSION THERAPY | Age: 69
End: 2019-03-08

## 2019-03-11 ENCOUNTER — HOSPITAL ENCOUNTER (OUTPATIENT)
Age: 69
Discharge: HOME | End: 2019-03-11
Payer: MEDICARE

## 2019-03-11 LAB
ALBUMIN SERPL-MCNC: 3.7 G/DL (ref 3.5–5.2)
ALP BLD-CCNC: 151 U/L (ref 40–129)
ALT SERPL-CCNC: 24 U/L (ref 0–40)
ANION GAP SERPL CALCULATED.3IONS-SCNC: 10 MMOL/L (ref 7–16)
ANISOCYTOSIS: ABNORMAL
AST SERPL-CCNC: 32 U/L (ref 0–39)
BASOPHILS ABSOLUTE: 0.02 E9/L (ref 0–0.2)
BASOPHILS RELATIVE PERCENT: 0.9 % (ref 0–2)
BILIRUB SERPL-MCNC: 0.8 MG/DL (ref 0–1.2)
BUN BLDV-MCNC: 13 MG/DL (ref 8–23)
CALCIUM SERPL-MCNC: 9.9 MG/DL (ref 8.6–10.2)
CEA: 4 NG/ML (ref 0–5.2)
CHLORIDE BLD-SCNC: 106 MMOL/L (ref 98–107)
CO2: 27 MMOL/L (ref 22–29)
CREAT SERPL-MCNC: 1 MG/DL (ref 0.7–1.2)
EOSINOPHILS ABSOLUTE: 0.03 E9/L (ref 0.05–0.5)
EOSINOPHILS RELATIVE PERCENT: 1.8 % (ref 0–6)
GFR AFRICAN AMERICAN: >60
GFR NON-AFRICAN AMERICAN: >60 ML/MIN/1.73
GLUCOSE BLD-MCNC: 142 MG/DL (ref 74–99)
HCT VFR BLD CALC: 28.5 % (ref 37–54)
HEMOGLOBIN: 9 G/DL (ref 12.5–16.5)
LYMPHOCYTES ABSOLUTE: 0.67 E9/L (ref 1.5–4)
LYMPHOCYTES RELATIVE PERCENT: 34.5 % (ref 20–42)
MCH RBC QN AUTO: 32.1 PG (ref 26–35)
MCHC RBC AUTO-ENTMCNC: 31.6 % (ref 32–34.5)
MCV RBC AUTO: 101.8 FL (ref 80–99.9)
MONOCYTES ABSOLUTE: 0.06 E9/L (ref 0.1–0.95)
MONOCYTES RELATIVE PERCENT: 2.7 % (ref 2–12)
NEUTROPHILS ABSOLUTE: 1.14 E9/L (ref 1.8–7.3)
NEUTROPHILS RELATIVE PERCENT: 60.2 % (ref 43–80)
NUCLEATED RED BLOOD CELLS: 0.9 /100 WBC
OVALOCYTES: ABNORMAL
PDW BLD-RTO: 17.2 FL (ref 11.5–15)
PLATELET # BLD: 100 E9/L (ref 130–450)
PMV BLD AUTO: 10.6 FL (ref 7–12)
POIKILOCYTES: ABNORMAL
POLYCHROMASIA: ABNORMAL
POTASSIUM SERPL-SCNC: 4.1 MMOL/L (ref 3.5–5)
RBC # BLD: 2.8 E12/L (ref 3.8–5.8)
SODIUM BLD-SCNC: 143 MMOL/L (ref 132–146)
TOTAL PROTEIN: 6.4 G/DL (ref 6.4–8.3)
WBC # BLD: 1.9 E9/L (ref 4.5–11.5)

## 2019-03-11 PROCEDURE — 82378 CARCINOEMBRYONIC ANTIGEN: CPT

## 2019-03-11 PROCEDURE — 85025 COMPLETE CBC W/AUTO DIFF WBC: CPT

## 2019-03-11 PROCEDURE — 36415 COLL VENOUS BLD VENIPUNCTURE: CPT

## 2019-03-11 PROCEDURE — 80053 COMPREHEN METABOLIC PANEL: CPT

## 2019-03-12 ENCOUNTER — OFFICE VISIT (OUTPATIENT)
Dept: ONCOLOGY | Age: 69
End: 2019-03-12
Payer: MEDICARE

## 2019-03-12 ENCOUNTER — HOSPITAL ENCOUNTER (OUTPATIENT)
Dept: INFUSION THERAPY | Age: 69
Discharge: HOME | End: 2019-03-12
Payer: MEDICARE

## 2019-03-12 VITALS — SYSTOLIC BLOOD PRESSURE: 124 MMHG | HEART RATE: 65 BPM | DIASTOLIC BLOOD PRESSURE: 69 MMHG | RESPIRATION RATE: 20 BRPM

## 2019-03-12 VITALS
SYSTOLIC BLOOD PRESSURE: 122 MMHG | RESPIRATION RATE: 20 BRPM | DIASTOLIC BLOOD PRESSURE: 70 MMHG | BODY MASS INDEX: 26.35 KG/M2 | HEART RATE: 63 BPM | HEIGHT: 73 IN | TEMPERATURE: 98.3 F | WEIGHT: 198.8 LBS

## 2019-03-12 DIAGNOSIS — C20 RECTAL CANCER (HCC): Primary | ICD-10-CM

## 2019-03-12 DIAGNOSIS — C79.51 BONE METASTASIS (HCC): ICD-10-CM

## 2019-03-12 DIAGNOSIS — C78.7 RECTAL CANCER METASTASIZED TO LIVER (HCC): Primary | ICD-10-CM

## 2019-03-12 DIAGNOSIS — C20 RECTAL CANCER METASTASIZED TO LIVER (HCC): Primary | ICD-10-CM

## 2019-03-12 PROCEDURE — 2580000003 HC RX 258: Performed by: INTERNAL MEDICINE

## 2019-03-12 PROCEDURE — 6360000002 HC RX W HCPCS: Performed by: INTERNAL MEDICINE

## 2019-03-12 PROCEDURE — 96413 CHEMO IV INFUSION 1 HR: CPT

## 2019-03-12 PROCEDURE — 96415 CHEMO IV INFUSION ADDL HR: CPT

## 2019-03-12 PROCEDURE — 96372 THER/PROPH/DIAG INJ SC/IM: CPT

## 2019-03-12 PROCEDURE — 99214 OFFICE O/P EST MOD 30 MIN: CPT | Performed by: INTERNAL MEDICINE

## 2019-03-12 PROCEDURE — 96368 THER/DIAG CONCURRENT INF: CPT

## 2019-03-12 PROCEDURE — 96411 CHEMO IV PUSH ADDL DRUG: CPT

## 2019-03-12 PROCEDURE — 96367 TX/PROPH/DG ADDL SEQ IV INF: CPT

## 2019-03-12 PROCEDURE — 96417 CHEMO IV INFUS EACH ADDL SEQ: CPT

## 2019-03-12 PROCEDURE — 96375 TX/PRO/DX INJ NEW DRUG ADDON: CPT

## 2019-03-12 RX ORDER — 0.9 % SODIUM CHLORIDE 0.9 %
10 VIAL (ML) INJECTION ONCE
Status: CANCELLED | OUTPATIENT
Start: 2019-03-12 | End: 2019-03-12

## 2019-03-12 RX ORDER — HEPARIN SODIUM (PORCINE) LOCK FLUSH IV SOLN 100 UNIT/ML 100 UNIT/ML
500 SOLUTION INTRAVENOUS PRN
Status: CANCELLED | OUTPATIENT
Start: 2019-03-12

## 2019-03-12 RX ORDER — DEXAMETHASONE SODIUM PHOSPHATE 10 MG/ML
10 INJECTION, SOLUTION INTRAMUSCULAR; INTRAVENOUS ONCE
Status: COMPLETED | OUTPATIENT
Start: 2019-03-12 | End: 2019-03-12

## 2019-03-12 RX ORDER — METHYLPREDNISOLONE SODIUM SUCCINATE 125 MG/2ML
125 INJECTION, POWDER, LYOPHILIZED, FOR SOLUTION INTRAMUSCULAR; INTRAVENOUS ONCE
Status: CANCELLED | OUTPATIENT
Start: 2019-03-12 | End: 2019-03-12

## 2019-03-12 RX ORDER — DIPHENHYDRAMINE HYDROCHLORIDE 50 MG/ML
50 INJECTION INTRAMUSCULAR; INTRAVENOUS ONCE
Status: CANCELLED | OUTPATIENT
Start: 2019-03-12 | End: 2019-03-12

## 2019-03-12 RX ORDER — SODIUM CHLORIDE 0.9 % (FLUSH) 0.9 %
10 SYRINGE (ML) INJECTION PRN
Status: DISCONTINUED | OUTPATIENT
Start: 2019-03-12 | End: 2019-03-13 | Stop reason: HOSPADM

## 2019-03-12 RX ORDER — SODIUM CHLORIDE 9 MG/ML
INJECTION, SOLUTION INTRAVENOUS CONTINUOUS
Status: CANCELLED | OUTPATIENT
Start: 2019-03-12

## 2019-03-12 RX ORDER — EPINEPHRINE 1 MG/ML
0.3 INJECTION, SOLUTION, CONCENTRATE INTRAVENOUS PRN
Status: CANCELLED | OUTPATIENT
Start: 2019-03-12

## 2019-03-12 RX ORDER — PALONOSETRON 0.05 MG/ML
0.25 INJECTION, SOLUTION INTRAVENOUS ONCE
Status: COMPLETED | OUTPATIENT
Start: 2019-03-12 | End: 2019-03-12

## 2019-03-12 RX ORDER — FLUOROURACIL 50 MG/ML
850 INJECTION, SOLUTION INTRAVENOUS ONCE
Status: CANCELLED | OUTPATIENT
Start: 2019-03-12

## 2019-03-12 RX ORDER — FLUOROURACIL 50 MG/ML
850 INJECTION, SOLUTION INTRAVENOUS ONCE
Status: COMPLETED | OUTPATIENT
Start: 2019-03-12 | End: 2019-03-12

## 2019-03-12 RX ORDER — SODIUM CHLORIDE 9 MG/ML
250 INJECTION, SOLUTION INTRAVENOUS CONTINUOUS
Status: DISCONTINUED | OUTPATIENT
Start: 2019-03-12 | End: 2019-03-13 | Stop reason: HOSPADM

## 2019-03-12 RX ORDER — HEPARIN SODIUM (PORCINE) LOCK FLUSH IV SOLN 100 UNIT/ML 100 UNIT/ML
500 SOLUTION INTRAVENOUS PRN
Status: DISCONTINUED | OUTPATIENT
Start: 2019-03-12 | End: 2019-03-13 | Stop reason: HOSPADM

## 2019-03-12 RX ORDER — SODIUM CHLORIDE 9 MG/ML
250 INJECTION, SOLUTION INTRAVENOUS CONTINUOUS
Status: CANCELLED | OUTPATIENT
Start: 2019-03-12 | End: 2019-03-30

## 2019-03-12 RX ORDER — DEXAMETHASONE SODIUM PHOSPHATE 10 MG/ML
10 INJECTION, SOLUTION INTRAMUSCULAR; INTRAVENOUS ONCE
Status: CANCELLED | OUTPATIENT
Start: 2019-03-12

## 2019-03-12 RX ORDER — SODIUM CHLORIDE 0.9 % (FLUSH) 0.9 %
10 SYRINGE (ML) INJECTION PRN
Status: CANCELLED | OUTPATIENT
Start: 2019-03-12

## 2019-03-12 RX ORDER — ATROPINE SULFATE 0.4 MG/ML
0.4 AMPUL (ML) INJECTION ONCE
Status: CANCELLED | OUTPATIENT
Start: 2019-03-12 | End: 2019-03-12

## 2019-03-12 RX ORDER — PALONOSETRON 0.05 MG/ML
0.25 INJECTION, SOLUTION INTRAVENOUS ONCE
Status: CANCELLED | OUTPATIENT
Start: 2019-03-12

## 2019-03-12 RX ORDER — ATROPINE SULFATE 0.4 MG/ML
0.4 AMPUL (ML) INJECTION ONCE
Status: COMPLETED | OUTPATIENT
Start: 2019-03-12 | End: 2019-03-12

## 2019-03-12 RX ADMIN — Medication 10 ML: at 13:11

## 2019-03-12 RX ADMIN — LEUCOVORIN CALCIUM 850 MG: 350 INJECTION, POWDER, LYOPHILIZED, FOR SOLUTION INTRAMUSCULAR; INTRAVENOUS at 11:10

## 2019-03-12 RX ADMIN — DEXAMETHASONE SODIUM PHOSPHATE 10 MG: 10 INJECTION, SOLUTION INTRAMUSCULAR; INTRAVENOUS at 09:51

## 2019-03-12 RX ADMIN — PALONOSETRON 0.25 MG: 0.05 INJECTION, SOLUTION INTRAVENOUS at 09:48

## 2019-03-12 RX ADMIN — FLUOROURACIL 850 MG: 50 INJECTION, SOLUTION INTRAVENOUS at 13:07

## 2019-03-12 RX ADMIN — SODIUM CHLORIDE 250 ML: 9 INJECTION, SOLUTION INTRAVENOUS at 09:47

## 2019-03-12 RX ADMIN — ATROPINE SULFATE 0.4 MG: 0.4 INJECTION, SOLUTION INTRAMUSCULAR; INTRAVENOUS; SUBCUTANEOUS at 09:49

## 2019-03-12 RX ADMIN — SODIUM CHLORIDE 400 MG: 9 INJECTION, SOLUTION INTRAVENOUS at 11:10

## 2019-03-12 RX ADMIN — Medication 500 UNITS: at 13:11

## 2019-03-12 RX ADMIN — BEVACIZUMAB 400 MG: 400 INJECTION, SOLUTION INTRAVENOUS at 10:13

## 2019-03-12 RX ADMIN — DENOSUMAB 120 MG: 120 INJECTION SUBCUTANEOUS at 09:57

## 2019-03-12 NOTE — PROGRESS NOTES
Daniel Hinds, Lead RN notified of WBC 1.9, Platelet count 135, and ANC 1. 14. Per Daniel Hinds RN, with permission from kacy Leahy to proceed with chemotherapy today.

## 2019-03-14 ENCOUNTER — HOSPITAL ENCOUNTER (OUTPATIENT)
Dept: INFUSION THERAPY | Age: 69
Discharge: HOME | End: 2019-03-14
Payer: MEDICARE

## 2019-03-14 DIAGNOSIS — C78.7 RECTAL CANCER METASTASIZED TO LIVER (HCC): Primary | ICD-10-CM

## 2019-03-14 DIAGNOSIS — C20 RECTAL CANCER METASTASIZED TO LIVER (HCC): Primary | ICD-10-CM

## 2019-03-14 PROCEDURE — 96372 THER/PROPH/DIAG INJ SC/IM: CPT

## 2019-03-14 PROCEDURE — 6360000002 HC RX W HCPCS: Performed by: INTERNAL MEDICINE

## 2019-03-14 RX ADMIN — PEGFILGRASTIM 6 MG: 6 INJECTION SUBCUTANEOUS at 14:04

## 2019-03-19 ENCOUNTER — HOSPITAL ENCOUNTER (OUTPATIENT)
Dept: MRI IMAGING | Age: 69
Discharge: HOME | End: 2019-03-21
Payer: MEDICARE

## 2019-03-19 DIAGNOSIS — C20 RECTAL CANCER METASTASIZED TO LIVER (HCC): ICD-10-CM

## 2019-03-19 DIAGNOSIS — C78.7 RECTAL CANCER METASTASIZED TO LIVER (HCC): ICD-10-CM

## 2019-03-19 PROCEDURE — A9577 INJ MULTIHANCE: HCPCS | Performed by: RADIOLOGY

## 2019-03-19 PROCEDURE — 6360000004 HC RX CONTRAST MEDICATION: Performed by: RADIOLOGY

## 2019-03-19 PROCEDURE — 72157 MRI CHEST SPINE W/O & W/DYE: CPT

## 2019-03-19 RX ADMIN — GADOBENATE DIMEGLUMINE 18 ML: 529 INJECTION, SOLUTION INTRAVENOUS at 11:20

## 2019-03-25 ENCOUNTER — HOSPITAL ENCOUNTER (OUTPATIENT)
Age: 69
Discharge: HOME | End: 2019-03-25
Payer: MEDICARE

## 2019-03-25 LAB
ALBUMIN SERPL-MCNC: 3.8 G/DL (ref 3.5–5.2)
ALP BLD-CCNC: 154 U/L (ref 40–129)
ALT SERPL-CCNC: 23 U/L (ref 0–40)
ANION GAP SERPL CALCULATED.3IONS-SCNC: 13 MMOL/L (ref 7–16)
AST SERPL-CCNC: 31 U/L (ref 0–39)
BASOPHILS ABSOLUTE: 0.02 E9/L (ref 0–0.2)
BASOPHILS RELATIVE PERCENT: 0.9 % (ref 0–2)
BILIRUB SERPL-MCNC: 0.8 MG/DL (ref 0–1.2)
BUN BLDV-MCNC: 10 MG/DL (ref 8–23)
CALCIUM SERPL-MCNC: 9.1 MG/DL (ref 8.6–10.2)
CEA: 4.7 NG/ML (ref 0–5.2)
CHLORIDE BLD-SCNC: 104 MMOL/L (ref 98–107)
CO2: 24 MMOL/L (ref 22–29)
CREAT SERPL-MCNC: 0.9 MG/DL (ref 0.7–1.2)
EOSINOPHILS ABSOLUTE: 0.05 E9/L (ref 0.05–0.5)
EOSINOPHILS RELATIVE PERCENT: 2.6 % (ref 0–6)
GFR AFRICAN AMERICAN: >60
GFR NON-AFRICAN AMERICAN: >60 ML/MIN/1.73
GLUCOSE BLD-MCNC: 131 MG/DL (ref 74–99)
HCT VFR BLD CALC: 28.5 % (ref 37–54)
HEMOGLOBIN: 9.1 G/DL (ref 12.5–16.5)
LYMPHOCYTES ABSOLUTE: 0.48 E9/L (ref 1.5–4)
LYMPHOCYTES RELATIVE PERCENT: 24.3 % (ref 20–42)
MCH RBC QN AUTO: 31.9 PG (ref 26–35)
MCHC RBC AUTO-ENTMCNC: 31.9 % (ref 32–34.5)
MCV RBC AUTO: 100 FL (ref 80–99.9)
MONOCYTES ABSOLUTE: 0.16 E9/L (ref 0.1–0.95)
MONOCYTES RELATIVE PERCENT: 7.8 % (ref 2–12)
NEUTROPHILS ABSOLUTE: 1.28 E9/L (ref 1.8–7.3)
NEUTROPHILS RELATIVE PERCENT: 64.3 % (ref 43–80)
PDW BLD-RTO: 17.9 FL (ref 11.5–15)
PLATELET # BLD: 103 E9/L (ref 130–450)
PMV BLD AUTO: 10.4 FL (ref 7–12)
POTASSIUM SERPL-SCNC: 3.6 MMOL/L (ref 3.5–5)
RBC # BLD: 2.85 E12/L (ref 3.8–5.8)
RBC # BLD: NORMAL 10*6/UL
SODIUM BLD-SCNC: 141 MMOL/L (ref 132–146)
TOTAL PROTEIN: 6.4 G/DL (ref 6.4–8.3)
WBC # BLD: 2 E9/L (ref 4.5–11.5)

## 2019-03-25 PROCEDURE — 36415 COLL VENOUS BLD VENIPUNCTURE: CPT

## 2019-03-25 PROCEDURE — 80053 COMPREHEN METABOLIC PANEL: CPT

## 2019-03-25 PROCEDURE — 82378 CARCINOEMBRYONIC ANTIGEN: CPT

## 2019-03-25 PROCEDURE — 85025 COMPLETE CBC W/AUTO DIFF WBC: CPT

## 2019-03-26 ENCOUNTER — OFFICE VISIT (OUTPATIENT)
Dept: ONCOLOGY | Age: 69
End: 2019-03-26
Payer: MEDICARE

## 2019-03-26 ENCOUNTER — HOSPITAL ENCOUNTER (OUTPATIENT)
Dept: INFUSION THERAPY | Age: 69
Discharge: HOME | End: 2019-03-26
Payer: MEDICARE

## 2019-03-26 VITALS
DIASTOLIC BLOOD PRESSURE: 66 MMHG | TEMPERATURE: 98.2 F | RESPIRATION RATE: 16 BRPM | SYSTOLIC BLOOD PRESSURE: 121 MMHG | HEART RATE: 67 BPM

## 2019-03-26 VITALS
HEIGHT: 73 IN | RESPIRATION RATE: 20 BRPM | HEART RATE: 65 BPM | TEMPERATURE: 98.1 F | WEIGHT: 192.4 LBS | DIASTOLIC BLOOD PRESSURE: 62 MMHG | SYSTOLIC BLOOD PRESSURE: 110 MMHG | BODY MASS INDEX: 25.5 KG/M2

## 2019-03-26 DIAGNOSIS — C78.7 RECTAL CANCER METASTASIZED TO LIVER (HCC): Primary | ICD-10-CM

## 2019-03-26 DIAGNOSIS — C20 RECTAL CANCER METASTASIZED TO LIVER (HCC): Primary | ICD-10-CM

## 2019-03-26 DIAGNOSIS — Z09 FOLLOW UP: Primary | ICD-10-CM

## 2019-03-26 PROCEDURE — 99214 OFFICE O/P EST MOD 30 MIN: CPT | Performed by: INTERNAL MEDICINE

## 2019-03-26 PROCEDURE — 96368 THER/DIAG CONCURRENT INF: CPT

## 2019-03-26 PROCEDURE — 2580000003 HC RX 258: Performed by: INTERNAL MEDICINE

## 2019-03-26 PROCEDURE — 6360000002 HC RX W HCPCS: Performed by: INTERNAL MEDICINE

## 2019-03-26 PROCEDURE — 96411 CHEMO IV PUSH ADDL DRUG: CPT

## 2019-03-26 PROCEDURE — 96413 CHEMO IV INFUSION 1 HR: CPT

## 2019-03-26 PROCEDURE — 96417 CHEMO IV INFUS EACH ADDL SEQ: CPT

## 2019-03-26 PROCEDURE — 96415 CHEMO IV INFUSION ADDL HR: CPT

## 2019-03-26 PROCEDURE — 96375 TX/PRO/DX INJ NEW DRUG ADDON: CPT

## 2019-03-26 RX ORDER — DIPHENHYDRAMINE HYDROCHLORIDE 50 MG/ML
50 INJECTION INTRAMUSCULAR; INTRAVENOUS ONCE
Status: CANCELLED | OUTPATIENT
Start: 2019-03-26 | End: 2019-03-26

## 2019-03-26 RX ORDER — DEXAMETHASONE SODIUM PHOSPHATE 10 MG/ML
10 INJECTION, SOLUTION INTRAMUSCULAR; INTRAVENOUS ONCE
Status: COMPLETED | OUTPATIENT
Start: 2019-03-26 | End: 2019-03-26

## 2019-03-26 RX ORDER — HEPARIN SODIUM (PORCINE) LOCK FLUSH IV SOLN 100 UNIT/ML 100 UNIT/ML
500 SOLUTION INTRAVENOUS PRN
Status: CANCELLED | OUTPATIENT
Start: 2019-03-26

## 2019-03-26 RX ORDER — EPINEPHRINE 1 MG/ML
0.3 INJECTION, SOLUTION, CONCENTRATE INTRAVENOUS PRN
Status: CANCELLED | OUTPATIENT
Start: 2019-03-26

## 2019-03-26 RX ORDER — FLUOROURACIL 50 MG/ML
850 INJECTION, SOLUTION INTRAVENOUS ONCE
Status: CANCELLED | OUTPATIENT
Start: 2019-03-26

## 2019-03-26 RX ORDER — METHYLPREDNISOLONE SODIUM SUCCINATE 125 MG/2ML
125 INJECTION, POWDER, LYOPHILIZED, FOR SOLUTION INTRAMUSCULAR; INTRAVENOUS ONCE
Status: CANCELLED | OUTPATIENT
Start: 2019-03-26 | End: 2019-03-26

## 2019-03-26 RX ORDER — PALONOSETRON 0.05 MG/ML
0.25 INJECTION, SOLUTION INTRAVENOUS ONCE
Status: COMPLETED | OUTPATIENT
Start: 2019-03-26 | End: 2019-03-26

## 2019-03-26 RX ORDER — ATROPINE SULFATE 0.4 MG/ML
0.4 AMPUL (ML) INJECTION ONCE
Status: COMPLETED | OUTPATIENT
Start: 2019-03-26 | End: 2019-03-26

## 2019-03-26 RX ORDER — SODIUM CHLORIDE 0.9 % (FLUSH) 0.9 %
10 SYRINGE (ML) INJECTION PRN
Status: DISCONTINUED | OUTPATIENT
Start: 2019-03-26 | End: 2019-03-27 | Stop reason: HOSPADM

## 2019-03-26 RX ORDER — HEPARIN SODIUM (PORCINE) LOCK FLUSH IV SOLN 100 UNIT/ML 100 UNIT/ML
500 SOLUTION INTRAVENOUS PRN
Status: DISCONTINUED | OUTPATIENT
Start: 2019-03-26 | End: 2019-03-27 | Stop reason: HOSPADM

## 2019-03-26 RX ORDER — SODIUM CHLORIDE 9 MG/ML
250 INJECTION, SOLUTION INTRAVENOUS CONTINUOUS
Status: CANCELLED | OUTPATIENT
Start: 2019-03-26 | End: 2019-04-12

## 2019-03-26 RX ORDER — ATROPINE SULFATE 0.4 MG/ML
0.4 AMPUL (ML) INJECTION ONCE
Status: CANCELLED | OUTPATIENT
Start: 2019-03-26 | End: 2019-03-26

## 2019-03-26 RX ORDER — PALONOSETRON 0.05 MG/ML
0.25 INJECTION, SOLUTION INTRAVENOUS ONCE
Status: CANCELLED | OUTPATIENT
Start: 2019-03-26

## 2019-03-26 RX ORDER — DEXAMETHASONE SODIUM PHOSPHATE 10 MG/ML
10 INJECTION, SOLUTION INTRAMUSCULAR; INTRAVENOUS ONCE
Status: CANCELLED | OUTPATIENT
Start: 2019-03-26

## 2019-03-26 RX ORDER — SODIUM CHLORIDE 0.9 % (FLUSH) 0.9 %
10 SYRINGE (ML) INJECTION PRN
Status: CANCELLED | OUTPATIENT
Start: 2019-03-26

## 2019-03-26 RX ORDER — SODIUM CHLORIDE 9 MG/ML
INJECTION, SOLUTION INTRAVENOUS CONTINUOUS
Status: CANCELLED | OUTPATIENT
Start: 2019-03-26

## 2019-03-26 RX ORDER — SODIUM CHLORIDE 9 MG/ML
250 INJECTION, SOLUTION INTRAVENOUS CONTINUOUS
Status: DISCONTINUED | OUTPATIENT
Start: 2019-03-26 | End: 2019-03-27 | Stop reason: HOSPADM

## 2019-03-26 RX ORDER — FLUOROURACIL 50 MG/ML
850 INJECTION, SOLUTION INTRAVENOUS ONCE
Status: COMPLETED | OUTPATIENT
Start: 2019-03-26 | End: 2019-03-26

## 2019-03-26 RX ORDER — 0.9 % SODIUM CHLORIDE 0.9 %
10 VIAL (ML) INJECTION ONCE
Status: CANCELLED | OUTPATIENT
Start: 2019-03-26 | End: 2019-03-26

## 2019-03-26 RX ADMIN — FLUOROURACIL 850 MG: 50 INJECTION, SOLUTION INTRAVENOUS at 12:14

## 2019-03-26 RX ADMIN — ATROPINE SULFATE 0.4 MG: 0.4 INJECTION, SOLUTION INTRAMUSCULAR; INTRAVENOUS; SUBCUTANEOUS at 09:14

## 2019-03-26 RX ADMIN — DEXAMETHASONE SODIUM PHOSPHATE 10 MG: 10 INJECTION, SOLUTION INTRAMUSCULAR; INTRAVENOUS at 09:16

## 2019-03-26 RX ADMIN — BEVACIZUMAB 400 MG: 400 INJECTION, SOLUTION INTRAVENOUS at 09:33

## 2019-03-26 RX ADMIN — SODIUM CHLORIDE 250 ML: 9 INJECTION, SOLUTION INTRAVENOUS at 09:10

## 2019-03-26 RX ADMIN — PALONOSETRON 0.25 MG: 0.05 INJECTION, SOLUTION INTRAVENOUS at 09:12

## 2019-03-26 RX ADMIN — LEUCOVORIN CALCIUM 850 MG: 350 INJECTION, POWDER, LYOPHILIZED, FOR SOLUTION INTRAMUSCULAR; INTRAVENOUS at 10:15

## 2019-03-26 RX ADMIN — Medication 10 ML: at 12:18

## 2019-03-26 RX ADMIN — Medication 500 UNITS: at 12:18

## 2019-03-26 RX ADMIN — SODIUM CHLORIDE 400 MG: 9 INJECTION, SOLUTION INTRAVENOUS at 10:17

## 2019-03-27 ENCOUNTER — INITIAL CONSULT (OUTPATIENT)
Dept: NEUROSURGERY | Age: 69
End: 2019-03-27
Payer: MEDICARE

## 2019-03-27 VITALS
HEART RATE: 65 BPM | HEIGHT: 73 IN | WEIGHT: 200 LBS | BODY MASS INDEX: 26.51 KG/M2 | DIASTOLIC BLOOD PRESSURE: 57 MMHG | SYSTOLIC BLOOD PRESSURE: 116 MMHG

## 2019-03-27 DIAGNOSIS — M51.24 THORACIC DISC HERNIATION: ICD-10-CM

## 2019-03-27 PROCEDURE — 99203 OFFICE O/P NEW LOW 30 MIN: CPT | Performed by: PHYSICIAN ASSISTANT

## 2019-03-27 NOTE — LETTER
 Cancer Mother         breast cancer    Cancer Father         malignant brain tumor    Diabetes Brother        Review of Systems:  Denies fever, chills, or night sweats  Denies headache, dizziness, syncope  Denies blurred vision, double vision  Denies chest pain, palpitations, SOB  Denies diarrhea, constipation, n/v  Denies dysuria, hematuria  Denies recent infections  Denies easy bruising  Denies anxiety, depression    Physical Exam:  WDWN, resting comfortable, no apparent distress  Appears stated age  Vitals stable  Non-labored breathing   A&O x 3, normal affect   Head is normocephalic, atraumatic   No palpable lymphadenopathy   Abdomen soft, nontender  Pupils equal and reactive, no scleral icterus  EOMI bilaterally  Cranial nerves II-XII intact bilaterally  No drift  5/5 in BUE  5/5 in BLE  Sensation to LT intact x 4 ext  Toes going down  Skin warm and dry    Review of Imaging:  MRI Thoracic Spine   Impression       Right parasagittal disc protrusion effacing the anterior thecal sac   and compressing upon the thoracic cord and right exiting nerve root at   T10-T11 level. Assessment: Patient with thoracic disc herniation. Stable. Plan:  -MRI reviewed with patient  -No surgical intervention  -Pain control and expectations discussed  -Can consider PT and Pain Management if pain worsens  -RTC PRN    Dr. Fredis Tom has independently seen and evaluated the patient. If you have questions, please do not hesitate to call me. I look forward to following Faheem Valenzuela along with you.     Sincerely,        ELICEO Pearce

## 2019-03-28 ENCOUNTER — HOSPITAL ENCOUNTER (OUTPATIENT)
Dept: INFUSION THERAPY | Age: 69
Discharge: HOME | End: 2019-03-28
Payer: MEDICARE

## 2019-03-28 DIAGNOSIS — C78.7 RECTAL CANCER METASTASIZED TO LIVER (HCC): Primary | ICD-10-CM

## 2019-03-28 DIAGNOSIS — C20 RECTAL CANCER METASTASIZED TO LIVER (HCC): Primary | ICD-10-CM

## 2019-03-28 PROCEDURE — 6360000002 HC RX W HCPCS: Performed by: INTERNAL MEDICINE

## 2019-03-28 PROCEDURE — 96372 THER/PROPH/DIAG INJ SC/IM: CPT

## 2019-03-28 RX ADMIN — PEGFILGRASTIM 6 MG: 6 INJECTION SUBCUTANEOUS at 15:29

## 2019-04-01 PROBLEM — M51.24 THORACIC DISC HERNIATION: Status: ACTIVE | Noted: 2019-04-01

## 2019-04-01 NOTE — PROGRESS NOTES
lymphadenopathy   Abdomen soft, nontender  Pupils equal and reactive, no scleral icterus  EOMI bilaterally  Cranial nerves II-XII intact bilaterally  No drift  5/5 in BUE  5/5 in BLE  Sensation to LT intact x 4 ext  Toes going down  Skin warm and dry    Review of Imaging:  MRI Thoracic Spine   Impression       Right parasagittal disc protrusion effacing the anterior thecal sac   and compressing upon the thoracic cord and right exiting nerve root at   T10-T11 level. Assessment: Patient with thoracic disc herniation. Stable. Plan:  -MRI reviewed with patient  -No surgical intervention  -Pain control and expectations discussed  -Can consider PT and Pain Management if pain worsens  -RTC PRN    Dr. Salcido has independently seen and evaluated the patient.

## 2019-04-01 NOTE — PATIENT INSTRUCTIONS

## 2019-04-08 ENCOUNTER — HOSPITAL ENCOUNTER (OUTPATIENT)
Age: 69
Discharge: HOME | End: 2019-04-08
Payer: MEDICARE

## 2019-04-08 LAB
ALBUMIN SERPL-MCNC: 3.6 G/DL (ref 3.5–5.2)
ALP BLD-CCNC: 150 U/L (ref 40–129)
ALT SERPL-CCNC: 20 U/L (ref 0–40)
ANION GAP SERPL CALCULATED.3IONS-SCNC: 7 MMOL/L (ref 7–16)
ANISOCYTOSIS: ABNORMAL
AST SERPL-CCNC: 31 U/L (ref 0–39)
BASOPHILS ABSOLUTE: 0 E9/L (ref 0–0.2)
BASOPHILS RELATIVE PERCENT: 0 % (ref 0–2)
BILIRUB SERPL-MCNC: 0.8 MG/DL (ref 0–1.2)
BUN BLDV-MCNC: 16 MG/DL (ref 8–23)
CALCIUM SERPL-MCNC: 9.2 MG/DL (ref 8.6–10.2)
CEA: 5.6 NG/ML (ref 0–5.2)
CHLORIDE BLD-SCNC: 109 MMOL/L (ref 98–107)
CO2: 27 MMOL/L (ref 22–29)
CREAT SERPL-MCNC: 1.1 MG/DL (ref 0.7–1.2)
EOSINOPHILS ABSOLUTE: 0.02 E9/L (ref 0.05–0.5)
EOSINOPHILS RELATIVE PERCENT: 1 % (ref 0–6)
GFR AFRICAN AMERICAN: >60
GFR NON-AFRICAN AMERICAN: >60 ML/MIN/1.73
GLUCOSE BLD-MCNC: 115 MG/DL (ref 74–99)
HCT VFR BLD CALC: 26.6 % (ref 37–54)
HEMOGLOBIN: 8.4 G/DL (ref 12.5–16.5)
LYMPHOCYTES ABSOLUTE: 0.53 E9/L (ref 1.5–4)
LYMPHOCYTES RELATIVE PERCENT: 31 % (ref 20–42)
MCH RBC QN AUTO: 32.2 PG (ref 26–35)
MCHC RBC AUTO-ENTMCNC: 31.6 % (ref 32–34.5)
MCV RBC AUTO: 101.9 FL (ref 80–99.9)
MONOCYTES ABSOLUTE: 0.02 E9/L (ref 0.1–0.95)
MONOCYTES RELATIVE PERCENT: 1 % (ref 2–12)
NEUTROPHILS ABSOLUTE: 1.14 E9/L (ref 1.8–7.3)
NEUTROPHILS RELATIVE PERCENT: 67 % (ref 43–80)
OVALOCYTES: ABNORMAL
PDW BLD-RTO: 18.3 FL (ref 11.5–15)
PLATELET # BLD: 101 E9/L (ref 130–450)
PMV BLD AUTO: 10.6 FL (ref 7–12)
POIKILOCYTES: ABNORMAL
POLYCHROMASIA: ABNORMAL
POTASSIUM SERPL-SCNC: 3.6 MMOL/L (ref 3.5–5)
RBC # BLD: 2.61 E12/L (ref 3.8–5.8)
SODIUM BLD-SCNC: 143 MMOL/L (ref 132–146)
TEAR DROP CELLS: ABNORMAL
TOTAL PROTEIN: 6 G/DL (ref 6.4–8.3)
TOXIC GRANULATION: ABNORMAL
WBC # BLD: 1.7 E9/L (ref 4.5–11.5)

## 2019-04-08 PROCEDURE — 80053 COMPREHEN METABOLIC PANEL: CPT

## 2019-04-08 PROCEDURE — 85025 COMPLETE CBC W/AUTO DIFF WBC: CPT

## 2019-04-08 PROCEDURE — 36415 COLL VENOUS BLD VENIPUNCTURE: CPT

## 2019-04-08 PROCEDURE — 82378 CARCINOEMBRYONIC ANTIGEN: CPT

## 2019-04-09 ENCOUNTER — OFFICE VISIT (OUTPATIENT)
Dept: ONCOLOGY | Age: 69
End: 2019-04-09
Payer: MEDICARE

## 2019-04-09 ENCOUNTER — HOSPITAL ENCOUNTER (OUTPATIENT)
Dept: INFUSION THERAPY | Age: 69
Discharge: HOME | End: 2019-04-09
Payer: MEDICARE

## 2019-04-09 VITALS
TEMPERATURE: 97.6 F | WEIGHT: 192.5 LBS | BODY MASS INDEX: 25.51 KG/M2 | HEART RATE: 65 BPM | HEIGHT: 73 IN | RESPIRATION RATE: 20 BRPM | SYSTOLIC BLOOD PRESSURE: 114 MMHG | DIASTOLIC BLOOD PRESSURE: 63 MMHG

## 2019-04-09 DIAGNOSIS — C20 RECTAL CANCER METASTASIZED TO LIVER (HCC): Primary | ICD-10-CM

## 2019-04-09 DIAGNOSIS — C78.7 RECTAL CANCER METASTASIZED TO LIVER (HCC): Primary | ICD-10-CM

## 2019-04-09 DIAGNOSIS — C20 RECTAL CANCER (HCC): Primary | ICD-10-CM

## 2019-04-09 PROCEDURE — 99214 OFFICE O/P EST MOD 30 MIN: CPT | Performed by: INTERNAL MEDICINE

## 2019-04-09 PROCEDURE — 2580000003 HC RX 258: Performed by: INTERNAL MEDICINE

## 2019-04-09 PROCEDURE — 96368 THER/DIAG CONCURRENT INF: CPT

## 2019-04-09 PROCEDURE — 96413 CHEMO IV INFUSION 1 HR: CPT

## 2019-04-09 PROCEDURE — 96411 CHEMO IV PUSH ADDL DRUG: CPT

## 2019-04-09 PROCEDURE — 96415 CHEMO IV INFUSION ADDL HR: CPT

## 2019-04-09 PROCEDURE — 96375 TX/PRO/DX INJ NEW DRUG ADDON: CPT

## 2019-04-09 PROCEDURE — 6360000002 HC RX W HCPCS: Performed by: INTERNAL MEDICINE

## 2019-04-09 PROCEDURE — 96417 CHEMO IV INFUS EACH ADDL SEQ: CPT

## 2019-04-09 RX ORDER — FLUOROURACIL 50 MG/ML
850 INJECTION, SOLUTION INTRAVENOUS ONCE
Status: CANCELLED | OUTPATIENT
Start: 2019-04-09

## 2019-04-09 RX ORDER — PALONOSETRON 0.05 MG/ML
0.25 INJECTION, SOLUTION INTRAVENOUS ONCE
Status: CANCELLED | OUTPATIENT
Start: 2019-04-09

## 2019-04-09 RX ORDER — DEXAMETHASONE SODIUM PHOSPHATE 10 MG/ML
10 INJECTION, SOLUTION INTRAMUSCULAR; INTRAVENOUS ONCE
Status: COMPLETED | OUTPATIENT
Start: 2019-04-09 | End: 2019-04-09

## 2019-04-09 RX ORDER — SODIUM CHLORIDE 9 MG/ML
250 INJECTION, SOLUTION INTRAVENOUS CONTINUOUS
Status: DISCONTINUED | OUTPATIENT
Start: 2019-04-09 | End: 2019-04-10 | Stop reason: HOSPADM

## 2019-04-09 RX ORDER — HEPARIN SODIUM (PORCINE) LOCK FLUSH IV SOLN 100 UNIT/ML 100 UNIT/ML
500 SOLUTION INTRAVENOUS PRN
Status: CANCELLED | OUTPATIENT
Start: 2019-04-09

## 2019-04-09 RX ORDER — ATROPINE SULFATE 0.4 MG/ML
0.4 AMPUL (ML) INJECTION ONCE
Status: COMPLETED | OUTPATIENT
Start: 2019-04-09 | End: 2019-04-09

## 2019-04-09 RX ORDER — SODIUM CHLORIDE 9 MG/ML
INJECTION, SOLUTION INTRAVENOUS CONTINUOUS
Status: CANCELLED | OUTPATIENT
Start: 2019-04-09

## 2019-04-09 RX ORDER — SODIUM CHLORIDE 0.9 % (FLUSH) 0.9 %
10 SYRINGE (ML) INJECTION PRN
Status: CANCELLED | OUTPATIENT
Start: 2019-04-09

## 2019-04-09 RX ORDER — METHYLPREDNISOLONE SODIUM SUCCINATE 125 MG/2ML
125 INJECTION, POWDER, LYOPHILIZED, FOR SOLUTION INTRAMUSCULAR; INTRAVENOUS ONCE
Status: CANCELLED | OUTPATIENT
Start: 2019-04-09

## 2019-04-09 RX ORDER — DIPHENHYDRAMINE HYDROCHLORIDE 50 MG/ML
50 INJECTION INTRAMUSCULAR; INTRAVENOUS ONCE
Status: CANCELLED | OUTPATIENT
Start: 2019-04-09

## 2019-04-09 RX ORDER — ATROPINE SULFATE 0.4 MG/ML
0.4 AMPUL (ML) INJECTION ONCE
Status: CANCELLED | OUTPATIENT
Start: 2019-04-09

## 2019-04-09 RX ORDER — FLUOROURACIL 50 MG/ML
850 INJECTION, SOLUTION INTRAVENOUS ONCE
Status: COMPLETED | OUTPATIENT
Start: 2019-04-09 | End: 2019-04-09

## 2019-04-09 RX ORDER — SODIUM CHLORIDE 9 MG/ML
250 INJECTION, SOLUTION INTRAVENOUS CONTINUOUS
Status: CANCELLED | OUTPATIENT
Start: 2019-04-09

## 2019-04-09 RX ORDER — DEXAMETHASONE SODIUM PHOSPHATE 10 MG/ML
10 INJECTION, SOLUTION INTRAMUSCULAR; INTRAVENOUS ONCE
Status: CANCELLED | OUTPATIENT
Start: 2019-04-09

## 2019-04-09 RX ORDER — EPINEPHRINE 1 MG/ML
0.3 INJECTION, SOLUTION, CONCENTRATE INTRAVENOUS PRN
Status: CANCELLED | OUTPATIENT
Start: 2019-04-09

## 2019-04-09 RX ORDER — PALONOSETRON HYDROCHLORIDE 0.05 MG/ML
0.25 INJECTION, SOLUTION INTRAVENOUS ONCE
Status: COMPLETED | OUTPATIENT
Start: 2019-04-09 | End: 2019-04-09

## 2019-04-09 RX ORDER — 0.9 % SODIUM CHLORIDE 0.9 %
10 VIAL (ML) INJECTION ONCE
Status: CANCELLED | OUTPATIENT
Start: 2019-04-09

## 2019-04-09 RX ADMIN — SODIUM CHLORIDE 400 MG: 9 INJECTION, SOLUTION INTRAVENOUS at 11:36

## 2019-04-09 RX ADMIN — SODIUM CHLORIDE 250 ML: 9 INJECTION, SOLUTION INTRAVENOUS at 10:06

## 2019-04-09 RX ADMIN — LEUCOVORIN CALCIUM 850 MG: 10 INJECTION INTRAMUSCULAR; INTRAVENOUS at 11:36

## 2019-04-09 RX ADMIN — DEXAMETHASONE SODIUM PHOSPHATE 10 MG: 10 INJECTION, SOLUTION INTRAMUSCULAR; INTRAVENOUS at 10:08

## 2019-04-09 RX ADMIN — BEVACIZUMAB 400 MG: 400 INJECTION, SOLUTION INTRAVENOUS at 10:34

## 2019-04-09 RX ADMIN — SODIUM CHLORIDE 250 ML: 9 INJECTION, SOLUTION INTRAVENOUS at 13:36

## 2019-04-09 RX ADMIN — PALONOSETRON 0.25 MG: 0.25 INJECTION, SOLUTION INTRAVENOUS at 10:09

## 2019-04-09 RX ADMIN — FLUOROURACIL 850 MG: 50 INJECTION, SOLUTION INTRAVENOUS at 13:25

## 2019-04-09 RX ADMIN — ATROPINE SULFATE 0.4 MG: 0.4 INJECTION, SOLUTION INTRAMUSCULAR; INTRAVENOUS; SUBCUTANEOUS at 11:39

## 2019-04-09 NOTE — PROGRESS NOTES
[place] [id number]  Attending Clinic Note     Reason for Visit: Follow-up on a patient with Metastatic Rectal Cancer.     PCP: Slim Ernandez MD     History of Present Illness:  69 y/o  male who was referred to see Dr. Agustin Zapata (GI team) for evaluation of bright red blood per rectum, mild anemia and change in bowel habits with diarrhea. CEA 2640 on 10/19/2015. AlcP 299 AST 57 ALT 75 on 10/19/2015. Colonoscopy in 2013 noted no significant polyps, colitis or lesions at that time. Denies any Family History of colorectal cancer or polyps.     Colonoscopy on 10/19/2015 revealed:  1. Ascending polyp, 8 mm, hot snare: Tubulovillous adenoma. 2. Transverse polyp, 1 cm, hot snare: Serrated polyp most consistent with sessile serrated adenoma. 3. Five splenic flexure polyps, three - 5 mm, 7 mm, 8 mm, hot snare and biopsy: Four segments of Tubular Adenoma  4. Descending polyp, 5 mm, biopsy: Tubular adenoma. 5. Sigmoid polyp, 4 mm biopsy, Serrated polyp most consistent with sessile serrated adenoma. 6. Two rectal polyps, 4 mm biopsy: Serrated polyp most consistent with hyperplastic polyp. 7. Rectosigmoid colon mass (large mass approximately 65% circumference of the lumen; Very friable, firm and hard): Tubulovillous adenoma with associated focal erosion and fibroplasia.      CT scan abdomen/pelvis on 10/26/2015:  1. Small nodules at lung bases likely represent metastatic colon   cancer. 2. Extensive likely metastatic colon cancer throughout the liver.      3. Mild mural thickening and luminal narrowing in the   terminal ileum, otherwise nonspecific.      Colonoscopy with snare removal rectal mass was performed by Dr. Bryan Maldonado. Pathology proved:  Rectal polyp: Invasive adenocarcinoma involving villous adenoma and extending to the cauterized edge of excision. KRAS Mutation: Mutation detected. BRAF Mutation: Mutation not detected.   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     CEA re-stage in 2-3 months. Cycle # 12 FOLFOX + Avastin was on 04/26/2016. .5 on 04/26/2016. Cycle # 13 FOLFOX + Avastin was on 05/10/2016. .3 on 05/10/2016. Cycle # 14 FOLFOX+AVASTIN was on 05/24/2016. .5 on 05/24/2016. Cycle # 15 FOLFOX + Avastin was on 06/07/2016. CEA 90.5 on 06/07/2016. Admitted to [facility] 06/13/2016-06/16/2016 for abdominal pain: EGD noted 1.5 cm clean based duodenal bulb ulceration s/p epinephrine and bicap per Dr. Darlene Belle. No active bleeding. A. Stomach, biopsy: Mild chronic gastritis, immunostain negative for Helicobacter  B. Esophagus, biopsy: Gastric glandular mucosa with prominent intestinal metaplasia (Mad[place]'s epithelium), negative for epithelial dysplasia, esophageal squamous mucosa not identified  Cycle # 16 FOLFOX (discontinued avastin (bevacizumab) given association of peptic ulcer disease and known association of GI perforation) was on 06/21/2016. Cycle # 17 FOLFOX was on 07/05/2016. CEA 52.6 on 07/19/2016. Cycle # 17 FOLFOX was on 07/05/2016. CEA 52.6 on 07/05/2016. CEA 47.6 on 07/19/2016.     Re-staging scans 07/19/2106: CT Chest negative for metastatic disease. CT Abdomen/Pelvis: Stable hepatic lesions; Question new mural thickening in the cecum. Bone Scan: New Let hip lesion suspicious for bone metastasis.     Increased CEA; new mural thickening in the cecum and new left hip lesion suspicious for bone metastasis are consistent with disease progression; He derived maximum benefit from FOLFOX/AVASTIN. D/C FOLFOX/AVASTIN. We recommended FOLFIRI second line therapy. Cycle # 1 FOLFIRI was on 08/02/2016. CEA 40.8 on 08/02/2016. Xgeva q4 weeks started on 08/02/2016. Cycle # 2 FOLFIRI was on 08/16/2016. CEA 31.7 on 08/16/2016. Cycle # 3 FOLFIRI (added Avastin) was on 08/30/2016 given that ulcers healed on EGD 08/15/2016 by Dr. Jimenez Ar; Protonix bid since avastin re-started. Colonoscopy on 08/29/2016 (to look at the cecal area) unremarkable.  CEA 26.7 on 08/30/2016. Cycle # 4 FOLFIRI/Avastin was on 09/13/2016. CEA 23.4 on 09/13/2016. Cycle # 5 FOLFIRI/Avastin was on 09/27/2016. CEA 22.3 on 09/27/2016. Cycle # 6 FOLFIRI/Avastin was on 10/11/2016. CEA 18.4 on 10/11/2016.      Bone scan 10/21/2016 stable bone metastasis; ? New lesion anterior left sixth rib likely interval healing fracture. CT abdomen/pelvis revealed stable hepatic metastasis. CT chest negative for metastatic disease. Continue FOLFIRI/Avastin and repeat scans in 3 months. Cycle # 7 FOLFIRI/Avastin was on 10/25/2016. CEA 16.1  Cycle # 8 FOLFIRI/Avastin was on 11/08/2016. CEA 15.7  Cycle # 9 FOLFIRI/Avastin was on 11/29/2016. CEA 13.6 on 11/29/2016. Cycle # 10 FOLFIRI/Avastin was on 12/13/2016. CEA 10.6 on 12/13/2016. Cycle # 11 FOLFIRI/Avastin was on 12/27/2016. CEA 11.1 on 12/27/2016. Cycle # 12 FOLFIRI/Avastin was on 01/10/2017. CEA 9.7 on 01/10/2017.       CT chest 01/23/2017 No new pulmonary nodule or lymphadenopathy. Patchy groundglass opacities within the left lower lobe, suggestive of infectious or inflammatory etiology. Followup CT chest in 3 months. Slight increased linear sclerosis along the left anterior sixth rib corresponding to an area of increased uptake on the prior bone scan from 10/21/2016, favored to be related to interval healing changes of a nondisplaced fracture. CT abdomen/pelvis on 01/23/2017 Redemonstration of multiple hepatic metastases, which are similar compared to the prior CT from 10/21/2016. Redemonstration of area of increased sclerosis along the right acetabulum suspicious for metastatic disease. Bone scan on 01/23/2017 Stable subtle uptake within the left proximal diaphysis, again suggestive of metastatic disease  Decreased subtle uptake within the medial right acetabulum. Decreased uptake within the left anterior sixth rib corresponding to linear sclerosis on the recent CT, favored to be related to an joint rib fracture.     Continue FOLFIRI + Avastin and repeat scans in 3 months. Cycle # 13 FOLFIRI + Avastin was on 01/24/2017. Cycle # 14 FOLFIRI + Avastin was on 02/07/2017. Cycle # 15 FOLFIRI + Avastin was on 02/21/2017. Cycle # 16 FOLFIRI + Avastin was on 03/07/2017. Cycle # 17 FOLFIRI + Avastin was on 03/21/2017. Cycle # 18 FOLFIRI + Avastin was on 04/04/2017. CT chest 04/17/2017 negative for metastatic disease. CT abdomen/pelvis 04/17/2017 Stable hypodense metastatic lesions within the liver. Stable area of increased sclerosis along the right acetabulum  Bone scan 04/17/2017 Stable subtle uptake within the left proximal femoral diaphysis; No definite abnormal uptake in the region of a sclerotic lesion along the posterior column of the right acetabulum noted on CT. Stable slight uptake within the left anterior sixth rib corresponding to linear sclerosis on the recent CT, favored to be related to a fracture. Continue FOLFIRI + Avastin and repeat scans in 3 months. Cycle # 19 FOLFIRI + Avastin was on 04/18/2017. Cycle # 20 FOLFIRI + Avastin was on 05/02/2017. Cycle # 21 FOLFIRI + Avastin was on 05/16/2017. Cycle # 22 FOLFIRI + Avastin was on 05/30/2017. Cycle # 23 FOLFIRI + Avastin was on 06/13/2017. Cycle # 24 FOLFIRI + Avastin was on 06/27/2017. Cycle # 25 FOLFIRI + Avastin was on 07/11/2017. Cycle # 26 FOLFIRI + Avastin was on 07/25/2017. Cycle # 27 FOLFIRI + Avastin was on 08/08/2017. Cycle # 28 FOLFIRI + Avastin was on 08/22/2017. Cycle # 29 FOLFIRI + Avastin was on 09/05/2017. Cycle # 30 FOLFIRI + Avastin was on 09/19/2017. Bone scan 09/26/2017 stable. CT abdomen/pelvis on 09/26/2017 Stable hypodense mass in the liver. Stable sclerotic lesion in the acetabulum. CT chest 09/26/2017 negative for metastatic disease. Cycle # 31 FOLFIRI + Avastin was on 10/03/2017. CEA 7.4 on 10/03/2017. Cycle # 32 FOLFIRI + Avastin was on 10/17/2017. CEA 6.0 on 10/17/2017. Cycle # 33 FOLFIRI + Avastin was on 10/31/2017.  CEA 6.8 on Avastin was on 07/24/2018. Cycle # 52 FOLFIRI + Avastin was on 08/14/2018. Cycle # 53 FOLFIRI + Avastin was on 08/28/2018. CT chest 09/06/2018 noted interval decrease in size of LUCY measuring up to 4 mm, suggestive of treatment response. No mediastinal or hilar LN  CT abdomen/pelvis 09/06/2018 Slight interval increase in size of a previously noted metastatic lesion within the right hepatic lobe. This may be related to differences in phase of enhancement compared to the most recent study from 5/31/2018, the lesion remains decreased in size compared to the more previous studies. No abdominal, retroperitoneal, or pelvic lymphadenopathy. Continue FOLFIRI + Avastin and repeat scans in 2 months. Cycle # 54 FOLFIRI + Avastin was on 09/11/2018. Cycle # 55 FOLFIRI + Avastin was on 09/25/2018. Cycle # 56 FOLFIRI + Avastin was on 10/09/2018. Cycle # 57 FOLFIRI + Avastin was on 10/23/2018. CT chest 11/02/2018 stable 3 mm nodule within LUCY. No new right and left lung nodule. No mediastinal or osseous lesion. CT abdomen/pelvis 11/02/2018 stable metastatic disease to liver. No new metastatic disease identified. No mesenteric or retroperitoneal LN. No gross colonic lesion identified. Continue FOLFIRI + Avastin and repeat scans in 3 months. Cycle # 58 FOLFIRI + Avastin was on 11/06/2018. CEA 7.6 on 11/05/2018. Cycle # 59 FOLFIRI + Avastin was on 11/27/2018. CEA 6.9 on 11/26/2018. Cycle # 60 FOLFIRI + Avastin was on 12/11/2018. CEA 6.7 on 12/11/2018. Cycle # 61 FOLFIRI + Avastin was on 01/08/2019. CEA 5.6 on 01/07/2019. Cycle # 62 FOLFIRI + Avastin was on 01/29/2019. CEA 4.6 on 01/28/2019. Cycle # 63 FOLFIRI + Avastin was on 02/12/2019. CEA 4.3 on 02/11/2019. CT chest 02/21/2019 no evidence of metastatic disease. CT abdomen/pelvis 02/21/2019 stable hypodense liver lesions. No evidence of worsening metastatic disease. 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  Ordered MRI thoracic spine and referred to neurosurgery team.    Cycle # 64 FOLFIRI + Avastin was on 02/26/2019. CEA 4.7 on 02/25/2019. Cycle # 65 FOLFIRI + Avastin was on 03/12/2019. CEA 4.0 on 03/11/2019. Cycle # 66 FOLFIRI + Avastin was on 03/26/2019. CEA 4.7 on 03/25/2019. Review of Systems;  CONSTITUTIONAL: No fever, chills. Fair appetite and energy level. ENMT: Eyes: no diplopia; Nose: No epistaxis. Mouth: No oral lesions. RESPIRATORY: No hemoptysis, shortness of breath, cough. CARDIOVASCULAR: No chest pain, palpitations. GASTROINTESTINAL: No nausea/vomiting, abdominal pain. Intermittent diarrhea. GENITOURINARY: No dysuria, urinary frequency, hematuria. NEURO: No syncope, presyncope, headache. Remainder ROS: Negative. Past Medical History:   Past Medical History               Diagnosis Date    Arthritis      Cancer ([facility] [address]         colon    Depression      Hyperlipidemia      Hypertension           Medications:  Reviewed and reconciled.     Allergies: Allergies   Allergen Reactions    Neosporin [Neomycin-Polymyxin-Gramicidin] Rash    Tape Valerie Ng Tape] Rash      Physical Exam:  /63 (Site: Left Upper Arm, Position: Sitting, Cuff Size: Medium Adult)   Pulse 65   Temp 97.6 °F (36.4 °C) (Temporal)   Resp 20   Ht 6' 1\" (1.854 m)   Wt 192 lb 8 oz (87.3 kg)   BMI 25.40 kg/m²   GENERAL: Alert, oriented x 3, not in acute distress. HEENT: PERRLA, EOMI. No oral lesions. NECK: Supple. Without lymphadenopathy. LUNGS: Lungs are CTA bilaterally, with no wheezing, crackles or rhonchi. CARDIOVASCULAR: Regular rhythm. No murmurs, rubs or gallops. ABDOMEN: Soft. Non-tender, non-distended. Positive bowel sounds. EXTREMITIES: Without clubbing, cyanosis, or edema. NEUROLOGIC: No focal deficits. 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 KRAS Mutation: Mutation detected. BRAF Mutation: Mutation not detected.   NRAS largest lesion now measures 3.9 x 3.5 cm and previously measured 4.5 cm in maximum diameter. No mesenteric lymphadenopathy is identified; Bone Scan: No evidence of osseous metastasis. Continue same regimen and re-stage in 2-3 months. Cycle # 12 FOLFOX + Avastin was on 04/26/2016. .5 on 04/26/2016. Cycle # 13 FOLFOX + Avastin was on 05/10/2016. .3 on 05/10/2016. Cycle # 14 FOLFOX+AVASTIN was on 05/24/2016. .5 on 05/24/2016. Cycle # 15 FOLFOX + Avastin was on 06/07/2016. CEA 90.5 on 06/07/2016. Admitted to [facility] 06/13/2016-06/16/2016 for abdominal pain: EGD noted 1.5 cm clean based duodenal bulb ulceration s/p epinephrine and bicap per Dr. Julia Alvarado. No active bleeding. A. Stomach, biopsy: Mild chronic gastritis, immunostain negative for Helicobacter  B. Esophagus, biopsy: Gastric glandular mucosa with prominent ntestinal metaplasia ([place]'s epithelium), negative for epithelial dysplasia, esophageal squamous mucosa not identified     Cycle # 16 FOLFOX (discontinued avastin (bevacizumab) given association of peptic ulcer disease and known association of GI perforation.) was on 06/21/2016. CEA 47 on 06/21/2016. Cycle # 17 FOLFOX was on 07/05/2016. CEA 52.6 on 07/05/2016. CEA 47.6 on 07/19/2016.     Re-staging scans 07/19/2106: CT Chest negative for metastatic disease. CT Abdomen/Pelvis: Stable hepatic lesions; Question new mural thickening in the cecum. Bone Scan: New Let hip lesion suspicious for bone metastasis.     Increased CEA; new mural thickening in the cecum and new left hip lesion suspicious for bone metastasis are consistent with disease progression; He derived maximum benefit from FOLFOX/AVASTIN. D/C FOLFOX/AVASTIN. We recommended FOLFIRI second line therapy. Cycle # 1 FOLFIRI was on 08/02/2016. CEA 40.8 on 08/02/2016. Xgeva q4 weeks started on 08/02/2016. Cycle # 2 FOLFIRI was on 08/16/2016. CEA 31.7 on 08/16/2016.   Cycle # 3 FOLFIRI (added Avastin) was on 08/30/2016 given that ulcers healed on EGD 08/15/2016 by Dr. Brianna Abreu; Protonix bid since avastin re-started. Colonoscopy on 08/29/2016 (to look at the cecal area) unremarkable. CEA 26.7 on 08/30/2016. Cycle # 4 FOLFIRI/Avastin was on 09/13/2016. CEA 23.4 on 09/13/2016. Cycle # 5 FOLFIRI/Avastin was on 09/27/2016. CEA 22.3 on 09/27/2016. Cycle # 6 FOLFIRI/Avastin was on 10/11/2016. CEA 18.4 on 10/11/2016.      Bone scan 10/21/2016 stable bone metastasis; ? New lesion anterior left sixth rib likely interval healing fracture. CT abdomen/pelvis revealed stable hepatic metastasis. CT chest negative for metastatic disease. Continue FOLFIRI/Avastin and repeat scans in 3 months. Cycle # 7 FOLFIRI/Avastin was on 10/25/2016. CEA 16.1 on 10/25/2016. Cycle # 8 FOLFIRI/Avastin was on 11/08/2016. CEA 15.7 on 11/08/2016. Cycle # 9 FOLFIRI/Avastin was on 11/29/2016. CEA 13.6 on 11/29/2016. Cycle # 10 FOLFIRI/Avastin was on 12/13/2016. CEA 10.6 on 12/13/2016. Cycle # 11 FOLFIRI/Avastin was on 12/27/2016. CEA 11.1 on 12/27/2016. Cycle # 12 FOLFIRI/Avastin was on 01/10/2017. CEA 9.7 on 01/10/2017. CT chest 01/23/2017 No new pulmonary nodule or lymphadenopathy. Patchy groundglass opacities within the left lower lobe, suggestive of infectious or inflammatory etiology. Followup CT chest in 3 months. Slight increased linear sclerosis along the left anterior sixth rib corresponding to an area of increased uptake on the prior bone scan from 10/21/2016, favored to be related to interval healing changes of a nondisplaced fracture. CT abdomen/pelvis on 01/23/2017 Redemonstration of multiple hepatic metastases, which are similar compared to the prior CT from 10/21/2016. Redemonstration of area of increased sclerosis along the right acetabulum suspicious for metastatic disease.    Bone scan on 01/23/2017 Stable subtle uptake within the left proximal diaphysis, again suggestive of metastatic disease  Decreased subtle uptake within the medial right acetabulum. Decreased uptake within the left anterior sixth rib corresponding to linear sclerosis on the recent CT, favored to be related to an joint rib fracture. Continue FOLFIRI + Avastin and repeat scans in 3 months. Cycle # 13 FOLFIRI + Avastin was on 01/24/2017. Cycle # 14 FOLFIRI + Avastin was on 02/07/2017. Cycle # 15 FOLFIRI + Avastin was on 02/21/2017. Cycle # 16 FOLFIRI + Avastin was on 03/07/2017. Cycle # 17 FOLFIRI + Avastin was on 03/21/2017. Cycle # 18 FOLFIRI + Avastin was on 04/04/2017. CT chest 04/17/2017 negative for metastatic disease. CT abdomen/pelvis 04/17/2017 Stable hypodense metastatic lesions within the liver. Stable area of increased sclerosis along the right acetabulum  Bone scan 04/17/2017 Stable subtle uptake within the left proximal femoral diaphysis; No definite abnormal uptake in the region of a sclerotic lesion along the posterior column of the right acetabulum noted on CT. Stable slight uptake within the left anterior sixth rib corresponding to linear sclerosis on the recent CT, favored to be related to a fracture. Continue FOLFIRI + Avastin and repeat scans in 3 months. Cycle # 19 FOLFIRI + Avastin was on 04/18/2017. CEA 7.5 on 04/18/2017. Cycle # 20 FOLFIRI + Avastin was on 05/02/2017. CEA 7.7 on 05/02/2017. Cycle # 21 FOLFIRI + Avastin was on 05/16/2017. CEA 7.1 on 05/16/2017. Cycle # 22 FOLFIRI + Avastin was on 05/30/2017. CEA 8.2 on 05/30/2017. Cycle # 23 FOLFIRI + Avastin was on 06/13/2017. CEA 7.7 on 06/13/2017. Cycle # 24 FOLFIRI + Avastin was on 06/27/2017. CEA 6.6 on 06/27/2017. Re-staging scans 07/05/2017:  Bone scan stable proximal left femoral diaphysis, right acetabulum, and left anterior sixth rib lesions;  CT abdomen/pelvis stable hypodense metastatic lesions within the liver; CT chest without convincing evidence of metastatic disease. Cycle # 25 FOLFIRI + Avastin was on 07/11/2017.   CEA 6.3 on 07/11/2017. Cycle # 26 FOLFIRI + Avastin was on 07/25/2017. CEA 7.3 on 07/25/2017. Cycle # 27 FOLFIRI + Avastin was on 08/08/2017. CEA 6.5 on 08/08/2017. Cycle # 28 FOLFIRI + Avastin was on 08/22/2017. CEA 5.9 on 08/22/2017. Cycle # 29 FOLFIRI + Avastin was on 09/05/2017. CEA 6.3 on 09/05/2017. Cycle # 30 FOLFIRI + Avastin was on 09/19/2017. CEA 6.3 on 09/19/2017. Bone scan 09/26/2017 stable. CT abdomen/pelvis on 09/26/2017 Stable hypodense mass in the liver. Stable sclerotic lesion in the acetabulum. CT chest 09/26/2017 negative for metastatic disease. Cycle # 31 FOLFIRI + Avastin was on 10/03/2017. CEA 7.4 on 10/03/2017. Cycle # 32 FOLFIRI + Avastin was on 10/17/2017. CEA 6.0 on 10/17/2017. Cycle # 33 FOLFIRI + Avastin was on 10/31/2017. CEA 6.8 on 10/31/2017. Cycle # 34 FOLFIRI + Avastin was on 11/14/2017. CEA 7.2 on 11/14/2017. Cycle # 35 FOLFIRI + Avastin was on 11/28/2017. CEA 5.1 on 11/28/2017. Cycle # 36 FOLFIRI + Avastin was on 12/12/2017. CEA 6.1 on 12/12/2017. Bone scan 12/22/2017 noted no evidence of metastatic disease to the axial or appendicular skeleton. CT chest 12/22/2017 noted no evidence of metastatic disease. CT abdomen/pelvis 12/22/2017 noted stable hypodense lesions in the liver. Continue FOLFIRI + Avastin and repeat scans in 3 months. Cycle # 37 FOLFIRI + Avastin was on 12/27/2018. CEA 6.7 on 12/27/2017. Cycle # 38 FOLFIRI + Avastin was on 01/09/2018. CEA 5.0 on 01/09/2018. Cycle # 39 FOLFIRI + Avastin was on 01/23/2018. CEA 6.5 on 01/23/2018. Cycle # 40 FOLFIRI + Avastin was on 02/06/2018. CEA 6.9 on 02/06/2018. Cycle # 41 FOLFIRI + Avastin was on 02/20/2018. CEA 6.4 on 02/20/2018. Cycle # 42 FOLFIRI + Avastin was on 03/06/2018. CEA 6.6 on 03/06/2018. Cycle # 43 FOLFIRI + Avastin was on 03/20/2018. CEA 5.7 on 03/20/2018. Bone scan on 03/26/2018 negative for metastatic disease. CT chest 03/26/2018 noted ? new 7 mm nodule in LUCY.    CT abdomen/pelvis 03/26/2018 noted stable hypodense lesions in the liver. ? New small ascites in the abdomen. Patient wants to continue to monitor the lung finding and repeat scans in 2 months instead of changing the current chemotherapy regimen to oral Lonsurf. Cycle # 44 FOLFIRI + Avastin was on 04/03/2018. CEA 6.3 on 04/03/2018. Cycle # 45 FOLFIRI + Avastin was on 04/24/2018. CEA 5.3 on 04/24/2018. Cycle # 46 FOLFIRI + Avastin was on 05/08/2018. CEA 5.4 on 05/08/2018. Cycle # 47 FOLFIRI + Avastin was on 05/22/2018. CEA 6.1 on 05/22/2018. CT chest 05/31/2018 noted stable nodule in LUCY. No evidence of worsening malignancy. CT abdomen/pelvis on 05/31/2018 noted stable liver metastasis. No evidence of worsening malignancy. Continue FOLFIRI + Avastin and repeat scans in 3 months. Cycle # 48 FOLFIRI + Avastin was on 06/06/2018. CEA 6.3 on 06/05/2018. Cycle # 49 FOLFIRI + Avastin was on 06/19/2018. CEA 6.1 on 06/19/2018. Cycle # 50 FOLFIRI + Avastin was on 07/03/2018. CEA 7.4 on 07/03/2018. Cycle # 51 FOLFIRI + Avastin was on 07/24/2018. CEA 6.7 on 07/24/2018. Cycle # 52 FOLFIRI + Avastin was on 08/14/2018. CEA 6.8 on 08/14/2018. Cycle # 53 FOLFIRI + Avastin was on 08/28/2018. CEA 6.5 on 08/27/2018. CT chest 09/06/2018 noted interval decrease in size of LUCY measuring up to 4 mm, suggestive of treatment response. No mediastinal or hilar LN  CT abdomen/pelvis 09/06/2018 Slight interval increase in size of a previously noted metastatic lesion within the right hepatic lobe. This may be related to differences in phase of enhancement compared to the most recent study from 5/31/2018, the lesion remains decreased in size compared to the more previous studies. No abdominal, retroperitoneal, or pelvic lymphadenopathy. Continue FOLFIRI + Avastin and repeat scans in 2 months. Cycle # 54 FOLFIRI + Avastin was on 09/11/2018. CEA 6.4 on 09/10/2018. Cycle # 55 FOLFIRI + Avastin was on 09/25/2018. CEA 6.4 on 09/25/2018.    Cycle # 64

## 2019-04-11 ENCOUNTER — HOSPITAL ENCOUNTER (OUTPATIENT)
Dept: INFUSION THERAPY | Age: 69
Discharge: HOME | End: 2019-04-11
Payer: MEDICARE

## 2019-04-11 DIAGNOSIS — C78.7 RECTAL CANCER METASTASIZED TO LIVER (HCC): Primary | ICD-10-CM

## 2019-04-11 DIAGNOSIS — C20 RECTAL CANCER METASTASIZED TO LIVER (HCC): Primary | ICD-10-CM

## 2019-04-11 PROCEDURE — 6360000002 HC RX W HCPCS: Performed by: INTERNAL MEDICINE

## 2019-04-11 PROCEDURE — 96372 THER/PROPH/DIAG INJ SC/IM: CPT

## 2019-04-11 RX ADMIN — PEGFILGRASTIM 6 MG: 6 INJECTION SUBCUTANEOUS at 14:27

## 2019-04-17 ENCOUNTER — HOSPITAL ENCOUNTER (OUTPATIENT)
Age: 69
Discharge: HOME | End: 2019-04-17
Payer: MEDICARE

## 2019-04-17 LAB
ANISOCYTOSIS: ABNORMAL
BASOPHILS ABSOLUTE: 0 E9/L (ref 0–0.2)
BASOPHILS RELATIVE PERCENT: 0 % (ref 0–2)
EOSINOPHILS ABSOLUTE: 0.02 E9/L (ref 0.05–0.5)
EOSINOPHILS RELATIVE PERCENT: 1 % (ref 0–6)
FOLATE: >20 NG/ML (ref 4.8–24.2)
HCT VFR BLD CALC: 25.2 % (ref 37–54)
HEMOGLOBIN: 7.8 G/DL (ref 12.5–16.5)
HYPOCHROMIA: ABNORMAL
IMMATURE RETIC FRACT: 3.6 % (ref 2.3–13.4)
IRON SATURATION: 39 % (ref 20–55)
IRON: 62 MCG/DL (ref 59–158)
LYMPHOCYTES ABSOLUTE: 0.25 E9/L (ref 1.5–4)
LYMPHOCYTES RELATIVE PERCENT: 11 % (ref 20–42)
MCH RBC QN AUTO: 31.8 PG (ref 26–35)
MCHC RBC AUTO-ENTMCNC: 31 % (ref 32–34.5)
MCV RBC AUTO: 102.9 FL (ref 80–99.9)
MONOCYTES ABSOLUTE: 0.16 E9/L (ref 0.1–0.95)
MONOCYTES RELATIVE PERCENT: 7 % (ref 2–12)
NEUTROPHILS ABSOLUTE: 1.86 E9/L (ref 1.8–7.3)
NEUTROPHILS RELATIVE PERCENT: 81 % (ref 43–80)
OVALOCYTES: ABNORMAL
PDW BLD-RTO: 17.2 FL (ref 11.5–15)
PLATELET # BLD: 56 E9/L (ref 130–450)
PLATELET CONFIRMATION: NORMAL
PMV BLD AUTO: 13.2 FL (ref 7–12)
POIKILOCYTES: ABNORMAL
POLYCHROMASIA: ABNORMAL
RBC # BLD: 2.45 E12/L (ref 3.8–5.8)
RETIC HGB EQUIVALENT: 40.3 PG (ref 28.2–36.6)
RETICULOCYTE ABSOLUTE COUNT: 0 E12/L
RETICULOCYTE COUNT PCT: 0.2 % (ref 0.4–1.9)
TEAR DROP CELLS: ABNORMAL
TOTAL IRON BINDING CAPACITY: 159 MCG/DL (ref 250–450)
VITAMIN B-12: >2000 PG/ML (ref 211–946)
WBC # BLD: 2.3 E9/L (ref 4.5–11.5)

## 2019-04-17 PROCEDURE — 82607 VITAMIN B-12: CPT

## 2019-04-17 PROCEDURE — 83540 ASSAY OF IRON: CPT

## 2019-04-17 PROCEDURE — 85025 COMPLETE CBC W/AUTO DIFF WBC: CPT

## 2019-04-17 PROCEDURE — 85045 AUTOMATED RETICULOCYTE COUNT: CPT

## 2019-04-17 PROCEDURE — 36415 COLL VENOUS BLD VENIPUNCTURE: CPT

## 2019-04-17 PROCEDURE — 83550 IRON BINDING TEST: CPT

## 2019-04-17 PROCEDURE — 82746 ASSAY OF FOLIC ACID SERUM: CPT

## 2019-04-22 ENCOUNTER — HOSPITAL ENCOUNTER (OUTPATIENT)
Age: 69
Discharge: HOME | End: 2019-04-22
Payer: MEDICARE

## 2019-04-22 ENCOUNTER — TELEPHONE (OUTPATIENT)
Dept: INFUSION THERAPY | Age: 69
End: 2019-04-22

## 2019-04-22 LAB
ALBUMIN SERPL-MCNC: 3.9 G/DL (ref 3.5–5.2)
ALP BLD-CCNC: 158 U/L (ref 40–129)
ALT SERPL-CCNC: 21 U/L (ref 0–40)
ANION GAP SERPL CALCULATED.3IONS-SCNC: 12 MMOL/L (ref 7–16)
ANISOCYTOSIS: ABNORMAL
AST SERPL-CCNC: 31 U/L (ref 0–39)
BASOPHILS ABSOLUTE: 0.01 E9/L (ref 0–0.2)
BASOPHILS RELATIVE PERCENT: 0.5 % (ref 0–2)
BILIRUB SERPL-MCNC: 1 MG/DL (ref 0–1.2)
BUN BLDV-MCNC: 14 MG/DL (ref 8–23)
CALCIUM SERPL-MCNC: 8.7 MG/DL (ref 8.6–10.2)
CEA: 5 NG/ML (ref 0–5.2)
CHLORIDE BLD-SCNC: 106 MMOL/L (ref 98–107)
CO2: 24 MMOL/L (ref 22–29)
CREAT SERPL-MCNC: 1.1 MG/DL (ref 0.7–1.2)
EOSINOPHILS ABSOLUTE: 0.05 E9/L (ref 0.05–0.5)
EOSINOPHILS RELATIVE PERCENT: 2.6 % (ref 0–6)
GFR AFRICAN AMERICAN: >60
GFR NON-AFRICAN AMERICAN: >60 ML/MIN/1.73
GLUCOSE FASTING: 110 MG/DL (ref 74–99)
HCT VFR BLD CALC: 27.3 % (ref 37–54)
HEMOGLOBIN: 8.8 G/DL (ref 12.5–16.5)
IMMATURE GRANULOCYTES #: 0.02 E9/L
IMMATURE GRANULOCYTES %: 1.1 % (ref 0–5)
LYMPHOCYTES ABSOLUTE: 0.67 E9/L (ref 1.5–4)
LYMPHOCYTES RELATIVE PERCENT: 35.4 % (ref 20–42)
MCH RBC QN AUTO: 32.5 PG (ref 26–35)
MCHC RBC AUTO-ENTMCNC: 32.2 % (ref 32–34.5)
MCV RBC AUTO: 100.7 FL (ref 80–99.9)
MONOCYTES ABSOLUTE: 0.2 E9/L (ref 0.1–0.95)
MONOCYTES RELATIVE PERCENT: 10.6 % (ref 2–12)
NEUTROPHILS ABSOLUTE: 0.94 E9/L (ref 1.8–7.3)
NEUTROPHILS RELATIVE PERCENT: 49.8 % (ref 43–80)
OVALOCYTES: ABNORMAL
PDW BLD-RTO: 18.6 FL (ref 11.5–15)
PLATELET # BLD: 106 E9/L (ref 130–450)
PMV BLD AUTO: 10.6 FL (ref 7–12)
POIKILOCYTES: ABNORMAL
POLYCHROMASIA: ABNORMAL
POTASSIUM SERPL-SCNC: 3 MMOL/L (ref 3.5–5)
RBC # BLD: 2.71 E12/L (ref 3.8–5.8)
SODIUM BLD-SCNC: 142 MMOL/L (ref 132–146)
TEAR DROP CELLS: ABNORMAL
TOTAL PROTEIN: 6.8 G/DL (ref 6.4–8.3)
WBC # BLD: 1.9 E9/L (ref 4.5–11.5)

## 2019-04-22 PROCEDURE — 36415 COLL VENOUS BLD VENIPUNCTURE: CPT

## 2019-04-22 PROCEDURE — 82378 CARCINOEMBRYONIC ANTIGEN: CPT

## 2019-04-22 PROCEDURE — 80053 COMPREHEN METABOLIC PANEL: CPT

## 2019-04-22 PROCEDURE — 85025 COMPLETE CBC W/AUTO DIFF WBC: CPT

## 2019-04-22 NOTE — TELEPHONE ENCOUNTER
Alba Miller called his residence to inform him that his blood work was low and his chemotherapy is cancelled. He was encouaged to call the [facility] for instructions today. [employer] infusion notified of cancellation.

## 2019-04-22 NOTE — PROGRESS NOTES
He has lab work done today for his treatment for tomorrow. His WBC and ANC were low,  and chemotherapy was canceled for tomorrow. His potassium was also 3.0 and I called Russell Alvarez to tell him to increase his potassium 20 mEq tablet twice a day for the next week.  He has a prescription from his primary care physician did not need a renewal.

## 2019-04-26 DIAGNOSIS — C20 RECTAL CANCER METASTASIZED TO LIVER (HCC): Primary | ICD-10-CM

## 2019-04-26 DIAGNOSIS — C79.51 BONE METASTASIS (HCC): ICD-10-CM

## 2019-04-26 DIAGNOSIS — C78.7 RECTAL CANCER METASTASIZED TO LIVER (HCC): Primary | ICD-10-CM

## 2019-04-29 ENCOUNTER — HOSPITAL ENCOUNTER (OUTPATIENT)
Age: 69
Discharge: HOME | End: 2019-04-29
Payer: MEDICARE

## 2019-04-29 LAB
ALBUMIN SERPL-MCNC: 3.7 G/DL (ref 3.5–5.2)
ALP BLD-CCNC: 166 U/L (ref 40–129)
ALT SERPL-CCNC: 22 U/L (ref 0–40)
ANION GAP SERPL CALCULATED.3IONS-SCNC: 9 MMOL/L (ref 7–16)
ANISOCYTOSIS: ABNORMAL
AST SERPL-CCNC: 31 U/L (ref 0–39)
BASOPHILS ABSOLUTE: 0 E9/L (ref 0–0.2)
BASOPHILS RELATIVE PERCENT: 0.5 % (ref 0–2)
BILIRUB SERPL-MCNC: 0.5 MG/DL (ref 0–1.2)
BUN BLDV-MCNC: 19 MG/DL (ref 8–23)
CALCIUM SERPL-MCNC: 9.6 MG/DL (ref 8.6–10.2)
CEA: 4.9 NG/ML (ref 0–5.2)
CHLORIDE BLD-SCNC: 107 MMOL/L (ref 98–107)
CO2: 27 MMOL/L (ref 22–29)
CREAT SERPL-MCNC: 1.2 MG/DL (ref 0.7–1.2)
EOSINOPHILS ABSOLUTE: 0.15 E9/L (ref 0.05–0.5)
EOSINOPHILS RELATIVE PERCENT: 3.5 % (ref 0–6)
GFR AFRICAN AMERICAN: >60
GFR NON-AFRICAN AMERICAN: 60 ML/MIN/1.73
GLUCOSE BLD-MCNC: 114 MG/DL (ref 74–99)
HCT VFR BLD CALC: 29.3 % (ref 37–54)
HEMOGLOBIN: 9.1 G/DL (ref 12.5–16.5)
LYMPHOCYTES ABSOLUTE: 0.44 E9/L (ref 1.5–4)
LYMPHOCYTES RELATIVE PERCENT: 9.6 % (ref 20–42)
MCH RBC QN AUTO: 33 PG (ref 26–35)
MCHC RBC AUTO-ENTMCNC: 31.1 % (ref 32–34.5)
MCV RBC AUTO: 106.2 FL (ref 80–99.9)
MONOCYTES ABSOLUTE: 0.18 E9/L (ref 0.1–0.95)
MONOCYTES RELATIVE PERCENT: 4.4 % (ref 2–12)
NEUTROPHILS ABSOLUTE: 3.65 E9/L (ref 1.8–7.3)
NEUTROPHILS RELATIVE PERCENT: 82.5 % (ref 43–80)
OVALOCYTES: ABNORMAL
PDW BLD-RTO: 19.2 FL (ref 11.5–15)
PLATELET # BLD: 113 E9/L (ref 130–450)
PMV BLD AUTO: 10.9 FL (ref 7–12)
POIKILOCYTES: ABNORMAL
POLYCHROMASIA: ABNORMAL
POTASSIUM SERPL-SCNC: 4.4 MMOL/L (ref 3.5–5)
RBC # BLD: 2.76 E12/L (ref 3.8–5.8)
SODIUM BLD-SCNC: 143 MMOL/L (ref 132–146)
TEAR DROP CELLS: ABNORMAL
TOTAL PROTEIN: 6.5 G/DL (ref 6.4–8.3)
WBC # BLD: 4.4 E9/L (ref 4.5–11.5)

## 2019-04-29 PROCEDURE — 80053 COMPREHEN METABOLIC PANEL: CPT

## 2019-04-29 PROCEDURE — 85025 COMPLETE CBC W/AUTO DIFF WBC: CPT

## 2019-04-29 PROCEDURE — 82378 CARCINOEMBRYONIC ANTIGEN: CPT

## 2019-04-29 PROCEDURE — 36415 COLL VENOUS BLD VENIPUNCTURE: CPT

## 2019-04-30 ENCOUNTER — OFFICE VISIT (OUTPATIENT)
Dept: ONCOLOGY | Age: 69
End: 2019-04-30
Payer: MEDICARE

## 2019-04-30 ENCOUNTER — HOSPITAL ENCOUNTER (OUTPATIENT)
Dept: INFUSION THERAPY | Age: 69
Discharge: HOME | End: 2019-04-30
Payer: MEDICARE

## 2019-04-30 VITALS
RESPIRATION RATE: 20 BRPM | DIASTOLIC BLOOD PRESSURE: 70 MMHG | BODY MASS INDEX: 25.63 KG/M2 | HEIGHT: 73 IN | WEIGHT: 193.4 LBS | HEART RATE: 70 BPM | SYSTOLIC BLOOD PRESSURE: 116 MMHG | TEMPERATURE: 98 F

## 2019-04-30 VITALS — RESPIRATION RATE: 18 BRPM | DIASTOLIC BLOOD PRESSURE: 78 MMHG | HEART RATE: 59 BPM | SYSTOLIC BLOOD PRESSURE: 122 MMHG

## 2019-04-30 DIAGNOSIS — C78.7 RECTAL CANCER METASTASIZED TO LIVER (HCC): Primary | ICD-10-CM

## 2019-04-30 DIAGNOSIS — C20 RECTAL CANCER METASTASIZED TO LIVER (HCC): Primary | ICD-10-CM

## 2019-04-30 DIAGNOSIS — C79.51 BONE METASTASIS (HCC): ICD-10-CM

## 2019-04-30 DIAGNOSIS — C20 RECTAL CANCER (HCC): Primary | ICD-10-CM

## 2019-04-30 PROCEDURE — 6360000002 HC RX W HCPCS: Performed by: INTERNAL MEDICINE

## 2019-04-30 PROCEDURE — 96372 THER/PROPH/DIAG INJ SC/IM: CPT

## 2019-04-30 PROCEDURE — 99214 OFFICE O/P EST MOD 30 MIN: CPT | Performed by: INTERNAL MEDICINE

## 2019-04-30 PROCEDURE — 96413 CHEMO IV INFUSION 1 HR: CPT

## 2019-04-30 PROCEDURE — 96415 CHEMO IV INFUSION ADDL HR: CPT

## 2019-04-30 PROCEDURE — 96375 TX/PRO/DX INJ NEW DRUG ADDON: CPT

## 2019-04-30 PROCEDURE — 96417 CHEMO IV INFUS EACH ADDL SEQ: CPT

## 2019-04-30 PROCEDURE — 96368 THER/DIAG CONCURRENT INF: CPT

## 2019-04-30 PROCEDURE — 2580000003 HC RX 258: Performed by: INTERNAL MEDICINE

## 2019-04-30 PROCEDURE — 96411 CHEMO IV PUSH ADDL DRUG: CPT

## 2019-04-30 RX ORDER — DIPHENOXYLATE HYDROCHLORIDE AND ATROPINE SULFATE 2.5; .025 MG/1; MG/1
1 TABLET ORAL 4 TIMES DAILY PRN
Qty: 40 TABLET | Refills: 1 | Status: SHIPPED | OUTPATIENT
Start: 2019-04-30 | End: 2020-02-11 | Stop reason: SDUPTHER

## 2019-04-30 RX ORDER — METHYLPREDNISOLONE SODIUM SUCCINATE 125 MG/2ML
125 INJECTION, POWDER, LYOPHILIZED, FOR SOLUTION INTRAMUSCULAR; INTRAVENOUS ONCE
Status: CANCELLED | OUTPATIENT
Start: 2019-04-30

## 2019-04-30 RX ORDER — 0.9 % SODIUM CHLORIDE 0.9 %
10 VIAL (ML) INJECTION ONCE
Status: CANCELLED | OUTPATIENT
Start: 2019-04-30

## 2019-04-30 RX ORDER — FLUOROURACIL 50 MG/ML
850 INJECTION, SOLUTION INTRAVENOUS ONCE
Status: CANCELLED | OUTPATIENT
Start: 2019-04-30

## 2019-04-30 RX ORDER — ATROPINE SULFATE 0.4 MG/ML
0.4 AMPUL (ML) INJECTION ONCE
Status: CANCELLED | OUTPATIENT
Start: 2019-04-30

## 2019-04-30 RX ORDER — PALONOSETRON HYDROCHLORIDE 0.05 MG/ML
0.25 INJECTION, SOLUTION INTRAVENOUS ONCE
Status: COMPLETED | OUTPATIENT
Start: 2019-04-30 | End: 2019-04-30

## 2019-04-30 RX ORDER — SODIUM CHLORIDE 0.9 % (FLUSH) 0.9 %
10 SYRINGE (ML) INJECTION PRN
Status: CANCELLED | OUTPATIENT
Start: 2019-04-30

## 2019-04-30 RX ORDER — DEXAMETHASONE SODIUM PHOSPHATE 10 MG/ML
10 INJECTION, SOLUTION INTRAMUSCULAR; INTRAVENOUS ONCE
Status: CANCELLED | OUTPATIENT
Start: 2019-04-30

## 2019-04-30 RX ORDER — EPINEPHRINE 1 MG/ML
0.3 INJECTION, SOLUTION, CONCENTRATE INTRAVENOUS PRN
Status: CANCELLED | OUTPATIENT
Start: 2019-04-30

## 2019-04-30 RX ORDER — SODIUM CHLORIDE 9 MG/ML
INJECTION, SOLUTION INTRAVENOUS CONTINUOUS
Status: CANCELLED | OUTPATIENT
Start: 2019-04-30

## 2019-04-30 RX ORDER — ATROPINE SULFATE 0.4 MG/ML
0.4 AMPUL (ML) INJECTION ONCE
Status: COMPLETED | OUTPATIENT
Start: 2019-04-30 | End: 2019-04-30

## 2019-04-30 RX ORDER — DIPHENHYDRAMINE HYDROCHLORIDE 50 MG/ML
50 INJECTION INTRAMUSCULAR; INTRAVENOUS ONCE
Status: CANCELLED | OUTPATIENT
Start: 2019-04-30

## 2019-04-30 RX ORDER — FLUOROURACIL 50 MG/ML
850 INJECTION, SOLUTION INTRAVENOUS ONCE
Status: COMPLETED | OUTPATIENT
Start: 2019-04-30 | End: 2019-04-30

## 2019-04-30 RX ORDER — HEPARIN SODIUM (PORCINE) LOCK FLUSH IV SOLN 100 UNIT/ML 100 UNIT/ML
500 SOLUTION INTRAVENOUS PRN
Status: CANCELLED | OUTPATIENT
Start: 2019-04-30

## 2019-04-30 RX ORDER — HEPARIN SODIUM (PORCINE) LOCK FLUSH IV SOLN 100 UNIT/ML 100 UNIT/ML
500 SOLUTION INTRAVENOUS PRN
Status: DISCONTINUED | OUTPATIENT
Start: 2019-04-30 | End: 2019-05-01 | Stop reason: HOSPADM

## 2019-04-30 RX ORDER — PALONOSETRON 0.05 MG/ML
0.25 INJECTION, SOLUTION INTRAVENOUS ONCE
Status: CANCELLED | OUTPATIENT
Start: 2019-04-30

## 2019-04-30 RX ORDER — SODIUM CHLORIDE 9 MG/ML
250 INJECTION, SOLUTION INTRAVENOUS CONTINUOUS
Status: CANCELLED | OUTPATIENT
Start: 2019-04-30

## 2019-04-30 RX ORDER — DEXAMETHASONE SODIUM PHOSPHATE 10 MG/ML
10 INJECTION INTRAMUSCULAR; INTRAVENOUS ONCE
Status: COMPLETED | OUTPATIENT
Start: 2019-04-30 | End: 2019-04-30

## 2019-04-30 RX ORDER — SODIUM CHLORIDE 9 MG/ML
250 INJECTION, SOLUTION INTRAVENOUS CONTINUOUS
Status: DISCONTINUED | OUTPATIENT
Start: 2019-04-30 | End: 2019-05-01 | Stop reason: HOSPADM

## 2019-04-30 RX ORDER — SODIUM CHLORIDE 0.9 % (FLUSH) 0.9 %
10 SYRINGE (ML) INJECTION PRN
Status: DISCONTINUED | OUTPATIENT
Start: 2019-04-30 | End: 2019-05-01 | Stop reason: HOSPADM

## 2019-04-30 RX ADMIN — DEXAMETHASONE SODIUM PHOSPHATE 10 MG: 10 INJECTION INTRAMUSCULAR; INTRAVENOUS at 09:15

## 2019-04-30 RX ADMIN — PALONOSETRON 0.25 MG: 0.25 INJECTION, SOLUTION INTRAVENOUS at 09:11

## 2019-04-30 RX ADMIN — SODIUM CHLORIDE 250 ML: 9 INJECTION, SOLUTION INTRAVENOUS at 09:09

## 2019-04-30 RX ADMIN — ATROPINE SULFATE 0.4 MG: 0.4 INJECTION, SOLUTION INTRAMUSCULAR; INTRAVENOUS; SUBCUTANEOUS at 09:12

## 2019-04-30 RX ADMIN — FLUOROURACIL 850 MG: 50 INJECTION, SOLUTION INTRAVENOUS at 12:10

## 2019-04-30 RX ADMIN — SODIUM CHLORIDE 400 MG: 9 INJECTION, SOLUTION INTRAVENOUS at 10:14

## 2019-04-30 RX ADMIN — BEVACIZUMAB 400 MG: 400 INJECTION, SOLUTION INTRAVENOUS at 09:30

## 2019-04-30 RX ADMIN — Medication 500 UNITS: at 12:15

## 2019-04-30 RX ADMIN — DENOSUMAB 120 MG: 120 INJECTION SUBCUTANEOUS at 09:34

## 2019-04-30 RX ADMIN — Medication 10 ML: at 12:15

## 2019-04-30 RX ADMIN — LEUCOVORIN CALCIUM 850 MG: 10 INJECTION INTRAMUSCULAR; INTRAVENOUS at 10:11

## 2019-04-30 NOTE — PROGRESS NOTES
Patient presents to clinic today for Xgeva injection. Medication had been held due to need for patient to have some dental work. This has been completed and he is cleared to resume therapy. Patient's labs monitored, specifically, Calcium level, to ensure no increased risk of hypocalcemia with administration of the medication. Patient's calcium level is 9.6 mg/dL. Patient received therapy and will continue to be monitored prior to each dose.     Cher Abraham, [address] 4/30/2019 9:22 AM

## 2019-04-30 NOTE — PROGRESS NOTES
[place] [id number]  Attending Clinic Note     Reason for Visit: Follow-up on a patient with Metastatic Rectal Cancer.     PCP: Mahsa Moya MD     History of Present Illness:  69 y/o  male who was referred to see Dr. Joey Barton (GI team) for evaluation of bright red blood per rectum, mild anemia and change in bowel habits with diarrhea. CEA 2640 on 10/19/2015. AlcP 299 AST 57 ALT 75 on 10/19/2015. Colonoscopy in 2013 noted no significant polyps, colitis or lesions at that time. Denies any Family History of colorectal cancer or polyps.     Colonoscopy on 10/19/2015 revealed:  1. Ascending polyp, 8 mm, hot snare: Tubulovillous adenoma. 2. Transverse polyp, 1 cm, hot snare: Serrated polyp most consistent with sessile serrated adenoma. 3. Five splenic flexure polyps, three - 5 mm, 7 mm, 8 mm, hot snare and biopsy: Four segments of Tubular Adenoma  4. Descending polyp, 5 mm, biopsy: Tubular adenoma. 5. Sigmoid polyp, 4 mm biopsy, Serrated polyp most consistent with sessile serrated adenoma. 6. Two rectal polyps, 4 mm biopsy: Serrated polyp most consistent with hyperplastic polyp. 7. Rectosigmoid colon mass (large mass approximately 65% circumference of the lumen; Very friable, firm and hard): Tubulovillous adenoma with associated focal erosion and fibroplasia.      CT scan abdomen/pelvis on 10/26/2015:  1. Small nodules at lung bases likely represent metastatic colon   cancer. 2. Extensive likely metastatic colon cancer throughout the liver.      3. Mild mural thickening and luminal narrowing in the   terminal ileum, otherwise nonspecific.      Colonoscopy with snare removal rectal mass was performed by Dr. Anny Daly. Pathology proved:  Rectal polyp: Invasive adenocarcinoma involving villous adenoma and extending to the cauterized edge of excision. KRAS Mutation: Mutation detected. BRAF Mutation: Mutation not detected.   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     CEA 3488. Bone scan on 11/10/2015 noted no metastatic disease. CT chest on 11/10/2015 revealed Multiple pulmonary nodules in the upper and lower lobes consistent with metastatic disease;   Hepatic metastasis also visualized. 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 Mediport was placed. Cycle # 1 of FOLFOX + Avastin was on 11/23/2015. CEA was 4555 on 11/23/2015. Cycle # 2 of FOLFOX + Avastin was on 12/08/2015. CEA was 3160 on 12/08/2015. Cycle # 3 of FOLFOX + Avastin was on 12/22/2015. CEA was 2516 on 12/21/2015. Cycle # 4 of FOLFOX + Avastin was on 01/05/2016. CEA was 2098 on 01/05/2015. Cycle # 5 of FOLFOX + Avastin was on 01/19/2016. CEA was 1511 on 01/05/2015.     -Bone scan on 01/26/2016 noted no metastatic disease. CT chest on 01/26/2016 revealed Significant response to treatment with no visible residual nodules. CT scan abdomen/pelvis on 01/26/2016 noted Interval decreased size of multiple masses in the liver compatible with treatment response. Continue another 2 months of FOLFOX + Avastin and repeat scans. Cycle # 6 of FOLFOX + Avastin was on 02/02/2016.  on 02/02/2016. Cycle # 7 of FOLFOX + Avastin was on 02/16/2016.  on 02/16/2016. Cycle # 8 of FOLFOX + Avastin was on 03/01/2016.  on 03/01/2016. Cycle # 9 of FOLFOX + Avastin was on 03/15/2016.  on 03/15/2016. Cycle # 10 of FOLFOX + Avastin was on 03/29/2016. .4 on 03/29/2016. Cycle # 11 of FOLFOX + Avastin was on 04/12/2016. .4 on 04/12/2016.     Re-staging scans on 04/19/2016: CT Chest: clear lungs; no evidence of recurring pulmonary nodule; CT Abdomen/Pelvis: Further interval decrease in size of the multiple metastatic hepatic lesions, the largest lesion now measures 3.9 x 3.5 cm and previously measured 4.5 cm in maximum diameter. No mesenteric lymphadenopathy is identified; Bone Scan: No evidence of osseous metastasis.   Continue same regimen and re-stage in 2-3 months. Cycle # 12 FOLFOX + Avastin was on 04/26/2016. .5 on 04/26/2016. Cycle # 13 FOLFOX + Avastin was on 05/10/2016. .3 on 05/10/2016. Cycle # 14 FOLFOX+AVASTIN was on 05/24/2016. .5 on 05/24/2016. Cycle # 15 FOLFOX + Avastin was on 06/07/2016. CEA 90.5 on 06/07/2016. Admitted to [facility] 06/13/2016-06/16/2016 for abdominal pain: EGD noted 1.5 cm clean based duodenal bulb ulceration s/p epinephrine and bicap per Dr. Gonzalez Rm. No active bleeding. A. Stomach, biopsy: Mild chronic gastritis, immunostain negative for Helicobacter  B. Esophagus, biopsy: Gastric glandular mucosa with prominent intestinal metaplasia ([place]'s epithelium), negative for epithelial dysplasia, esophageal squamous mucosa not identified  Cycle # 16 FOLFOX (discontinued avastin (bevacizumab) given association of peptic ulcer disease and known association of GI perforation) was on 06/21/2016. Cycle # 17 FOLFOX was on 07/05/2016. CEA 52.6 on 07/19/2016. Cycle # 17 FOLFOX was on 07/05/2016. CEA 52.6 on 07/05/2016. CEA 47.6 on 07/19/2016.     Re-staging scans 07/19/2106: CT Chest negative for metastatic disease. CT Abdomen/Pelvis: Stable hepatic lesions; Question new mural thickening in the cecum. Bone Scan: New Let hip lesion suspicious for bone metastasis.     Increased CEA; new mural thickening in the cecum and new left hip lesion suspicious for bone metastasis are consistent with disease progression; He derived maximum benefit from FOLFOX/AVASTIN. D/C FOLFOX/AVASTIN. We recommended FOLFIRI second line therapy. Cycle # 1 FOLFIRI was on 08/02/2016. CEA 40.8 on 08/02/2016. Xgeva q4 weeks started on 08/02/2016. Cycle # 2 FOLFIRI was on 08/16/2016. CEA 31.7 on 08/16/2016. Cycle # 3 FOLFIRI (added Avastin) was on 08/30/2016 given that ulcers healed on EGD 08/15/2016 by Dr. Kishore Burnette; Protonix bid since avastin re-started. Colonoscopy on 08/29/2016 (to look at the cecal area) unremarkable.  CEA 26.7 on 08/30/2016. Cycle # 4 FOLFIRI/Avastin was on 09/13/2016. CEA 23.4 on 09/13/2016. Cycle # 5 FOLFIRI/Avastin was on 09/27/2016. CEA 22.3 on 09/27/2016. Cycle # 6 FOLFIRI/Avastin was on 10/11/2016. CEA 18.4 on 10/11/2016.      Bone scan 10/21/2016 stable bone metastasis; ? New lesion anterior left sixth rib likely interval healing fracture. CT abdomen/pelvis revealed stable hepatic metastasis. CT chest negative for metastatic disease. Continue FOLFIRI/Avastin and repeat scans in 3 months. Cycle # 7 FOLFIRI/Avastin was on 10/25/2016. CEA 16.1  Cycle # 8 FOLFIRI/Avastin was on 11/08/2016. CEA 15.7  Cycle # 9 FOLFIRI/Avastin was on 11/29/2016. CEA 13.6 on 11/29/2016. Cycle # 10 FOLFIRI/Avastin was on 12/13/2016. CEA 10.6 on 12/13/2016. Cycle # 11 FOLFIRI/Avastin was on 12/27/2016. CEA 11.1 on 12/27/2016. Cycle # 12 FOLFIRI/Avastin was on 01/10/2017. CEA 9.7 on 01/10/2017.       CT chest 01/23/2017 No new pulmonary nodule or lymphadenopathy. Patchy groundglass opacities within the left lower lobe, suggestive of infectious or inflammatory etiology. Followup CT chest in 3 months. Slight increased linear sclerosis along the left anterior sixth rib corresponding to an area of increased uptake on the prior bone scan from 10/21/2016, favored to be related to interval healing changes of a nondisplaced fracture. CT abdomen/pelvis on 01/23/2017 Redemonstration of multiple hepatic metastases, which are similar compared to the prior CT from 10/21/2016. Redemonstration of area of increased sclerosis along the right acetabulum suspicious for metastatic disease. Bone scan on 01/23/2017 Stable subtle uptake within the left proximal diaphysis, again suggestive of metastatic disease  Decreased subtle uptake within the medial right acetabulum. Decreased uptake within the left anterior sixth rib corresponding to linear sclerosis on the recent CT, favored to be related to an joint rib fracture.     Continue FOLFIRI + Avastin and repeat scans in 3 months. Cycle # 13 FOLFIRI + Avastin was on 01/24/2017. Cycle # 14 FOLFIRI + Avastin was on 02/07/2017. Cycle # 15 FOLFIRI + Avastin was on 02/21/2017. Cycle # 16 FOLFIRI + Avastin was on 03/07/2017. Cycle # 17 FOLFIRI + Avastin was on 03/21/2017. Cycle # 18 FOLFIRI + Avastin was on 04/04/2017. CT chest 04/17/2017 negative for metastatic disease. CT abdomen/pelvis 04/17/2017 Stable hypodense metastatic lesions within the liver. Stable area of increased sclerosis along the right acetabulum  Bone scan 04/17/2017 Stable subtle uptake within the left proximal femoral diaphysis; No definite abnormal uptake in the region of a sclerotic lesion along the posterior column of the right acetabulum noted on CT. Stable slight uptake within the left anterior sixth rib corresponding to linear sclerosis on the recent CT, favored to be related to a fracture. Continue FOLFIRI + Avastin and repeat scans in 3 months. Cycle # 19 FOLFIRI + Avastin was on 04/18/2017. Cycle # 20 FOLFIRI + Avastin was on 05/02/2017. Cycle # 21 FOLFIRI + Avastin was on 05/16/2017. Cycle # 22 FOLFIRI + Avastin was on 05/30/2017. Cycle # 23 FOLFIRI + Avastin was on 06/13/2017. Cycle # 24 FOLFIRI + Avastin was on 06/27/2017. Cycle # 25 FOLFIRI + Avastin was on 07/11/2017. Cycle # 26 FOLFIRI + Avastin was on 07/25/2017. Cycle # 27 FOLFIRI + Avastin was on 08/08/2017. Cycle # 28 FOLFIRI + Avastin was on 08/22/2017. Cycle # 29 FOLFIRI + Avastin was on 09/05/2017. Cycle # 30 FOLFIRI + Avastin was on 09/19/2017. Bone scan 09/26/2017 stable. CT abdomen/pelvis on 09/26/2017 Stable hypodense mass in the liver. Stable sclerotic lesion in the acetabulum. CT chest 09/26/2017 negative for metastatic disease. Cycle # 31 FOLFIRI + Avastin was on 10/03/2017. CEA 7.4 on 10/03/2017. Cycle # 32 FOLFIRI + Avastin was on 10/17/2017. CEA 6.0 on 10/17/2017. Cycle # 33 FOLFIRI + Avastin was on 10/31/2017.  CEA 6.8 on 10/31/2017. Cycle # 34 FOLFIRI + Avastin was on 11/14/2017. CEA 7.2 on 11/14/2017. Cycle # 35 FOLFIRI + Avastin was on 11/28/2017. CEA 5.1 on 11/28/2017. Cycle # 36 FOLFIRI + Avastin was on 12/12/2017. CEA 6.1 on 12/12/2017. Bone scan 12/22/2017 noted no evidence of metastatic disease to the axial or appendicular skeleton. CT chest 12/22/2017 noted no evidence of metastatic disease. CT abdomen/pelvis 12/22/2017 noted stable hypodense lesions in the liver. Continue FOLFIRI + Avastin and repeat scans in 3 months. Cycle # 37 FOLFIRI + Avastin was on 12/27/2018. Cycle # 38 FOLFIRI + Avastin was on 01/09/2018. Cycle # 39 FOLFIRI + Avastin was on 01/23/2018. Cycle # 40 FOLFIRI + Avastin was on 02/06/2018. Cycle # 41 FOLFIRI + Avastin was on 02/20/2018. Cycle # 42 FOLFIRI + Avastin was on 03/06/2018. Cycle # 43 FOLFIRI + Avastin was on 03/20/2018. Bone scan on 03/26/2018 negative for metastatic disease. CT chest 03/26/2018 noted ? new 7 mm nodule in LUCY. CT abdomen/pelvis 03/26/2018 noted stable hypodense lesions in the liver. ? New small ascites in the abdomen. Patient wants to continue to monitor the lung finding and repeat scans in 2 months instead of changing the current regimen into oral Lonsurf. Cycle # 44 FOLFIRI + Avastin was on 04/03/2018. CEA 6.3 on 04/03/2018. Cycle # 45 FOLFIRI + Avastin was on 04/24/2018. CEA 5.3 on 04/24/2018. Cycle # 46 FOLFIRI + Avastin was on 05/08/2018. CEA 5.4 on 05/08/2018. Cycle # 47 FOLFIRI + Avastin was on 05/22/2018. CEA 6.1 on 05/22/2018. CT chest 05/31/2018 noted stable nodule in LUCY. No evidence of worsening malignancy. CT abdomen/pelvis on 05/31/2018 noted stable liver metastasis. No evidence of worsening malignancy. Continue FOLFIRI + Avastin and repeat scans in 3 months. Cycle # 48 FOLFIRI + Avastin was on 06/06/2018. Cycle # 49 FOLFIRI + Avastin was on 06/19/2018. Cycle # 50 FOLFIRI + Avastin was on 07/03/2018.   Cycle # 51 FOLFIRI + Avastin was on 07/24/2018. Cycle # 52 FOLFIRI + Avastin was on 08/14/2018. Cycle # 53 FOLFIRI + Avastin was on 08/28/2018. CT chest 09/06/2018 noted interval decrease in size of LUCY measuring up to 4 mm, suggestive of treatment response. No mediastinal or hilar LN  CT abdomen/pelvis 09/06/2018 Slight interval increase in size of a previously noted metastatic lesion within the right hepatic lobe. This may be related to differences in phase of enhancement compared to the most recent study from 5/31/2018, the lesion remains decreased in size compared to the more previous studies. No abdominal, retroperitoneal, or pelvic lymphadenopathy. Continue FOLFIRI + Avastin and repeat scans in 2 months. Cycle # 54 FOLFIRI + Avastin was on 09/11/2018. Cycle # 55 FOLFIRI + Avastin was on 09/25/2018. Cycle # 56 FOLFIRI + Avastin was on 10/09/2018. Cycle # 57 FOLFIRI + Avastin was on 10/23/2018. CT chest 11/02/2018 stable 3 mm nodule within LUCY. No new right and left lung nodule. No mediastinal or osseous lesion. CT abdomen/pelvis 11/02/2018 stable metastatic disease to liver. No new metastatic disease identified. No mesenteric or retroperitoneal LN. No gross colonic lesion identified. Continue FOLFIRI + Avastin and repeat scans in 3 months. Cycle # 58 FOLFIRI + Avastin was on 11/06/2018. CEA 7.6 on 11/05/2018. Cycle # 59 FOLFIRI + Avastin was on 11/27/2018. CEA 6.9 on 11/26/2018. Cycle # 60 FOLFIRI + Avastin was on 12/11/2018. CEA 6.7 on 12/11/2018. Cycle # 61 FOLFIRI + Avastin was on 01/08/2019. CEA 5.6 on 01/07/2019. Cycle # 62 FOLFIRI + Avastin was on 01/29/2019. CEA 4.6 on 01/28/2019. Cycle # 63 FOLFIRI + Avastin was on 02/12/2019. CEA 4.3 on 02/11/2019. CT chest 02/21/2019 no evidence of metastatic disease. CT abdomen/pelvis 02/21/2019 stable hypodense liver lesions. No evidence of worsening metastatic disease. 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  Ordered MRI thoracic spine and referred to neurosurgery team.    Cycle # 64 FOLFIRI + Avastin was on 02/26/2019. CEA 4.7 on 02/25/2019. Cycle # 65 FOLFIRI + Avastin was on 03/12/2019. CEA 4.0 on 03/11/2019. Cycle # 66 FOLFIRI + Avastin was on 03/26/2019. CEA 4.7 on 03/25/2019. Cycle # 67 FOLFIRI + Avastin was on 04/09/2019. CEA 5.6 on 04/08/2019. Review of Systems;  CONSTITUTIONAL: No fever, chills. Fair appetite and energy level. ENMT: Eyes: no diplopia; Nose: No epistaxis. Mouth: No oral lesions. RESPIRATORY: No hemoptysis, shortness of breath, cough. CARDIOVASCULAR: No chest pain, palpitations. GASTROINTESTINAL: No nausea/vomiting, abdominal pain. Intermittent diarrhea. GENITOURINARY: No dysuria, urinary frequency, hematuria. NEURO: No syncope, presyncope, headache. Remainder ROS: Negative. Past Medical History:   Past Medical History               Diagnosis Date    Arthritis      Cancer ([facility] [address]         colon    Depression      Hyperlipidemia      Hypertension           Medications:  Reviewed and reconciled.     Allergies: Allergies   Allergen Reactions    Neosporin [Neomycin-Polymyxin-Gramicidin] Rash    Tape Birtha [place] Tape] Rash      Physical Exam:  /70 (Site: Left Upper Arm, Position: Sitting, Cuff Size: Medium Adult)   Pulse 70   Temp 98 °F (36.7 °C) (Temporal)   Resp 20   Ht 6' 1\" (1.854 m)   Wt 193 lb 6.4 oz (87.7 kg)   BMI 25.52 kg/m²   GENERAL: Alert, oriented x 3, not in acute distress. HEENT: PERRLA, EOMI. No oral lesions. NECK: Supple. Without lymphadenopathy. LUNGS: Lungs are CTA bilaterally, with no wheezing, crackles or rhonchi. CARDIOVASCULAR: Regular rhythm. No murmurs, rubs or gallops. ABDOMEN: Soft. Non-tender, non-distended. Positive bowel sounds. EXTREMITIES: Without clubbing, cyanosis, or edema. NEUROLOGIC: No focal deficits. 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    KRAS Mutation: Mutation detected. BRAF Mutation: Mutation not detected. 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 CT scan abdomen/pelvis on 10/26/2015 revealed small nodules at the lung bases, extensive liver lesions consistent with metastatic rectosigmoid cancer. CEA 3488 on 10/30/2015. Bone scan on 11/10/2015 noted no metastatic disease. CT chest on 11/10/2015 revealed Multiple pulmonary nodules in the upper and lower lobes consistent with metastatic disease; Hepatic metastasis also visualized. 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 Mediport was placed. Cycle # 1 of FOLFOX + Avastin was on 11/23/2015. CEA was 4555 on 11/23/2015. Cycle # 2 of FOLFOX + Avastin was on 12/08/2015. CEA was 3160 on 12/08/2015. Cycle # 3 of FOLFOX + Avastin was on 12/22/2015. CEA was 2516 on 12/21/2015. Cycle # 4 of FOLFOX + Avastin was on 01/05/2016. CEA was 2098 on 01/05/2015. Cycle # 5 of FOLFOX + Avastin was on 01/19/2016. CEA was 1511 on 01/05/2015.     -Bone scan on 01/26/2016 noted no metastatic disease. CT chest on 01/26/2016 revealed Significant response to treatment with no visible residual nodules. CT scan abdomen/pelvis on 01/26/2016 noted Interval decreased size of multiple masses in the liver compatible with treatment response. Continue another 2 months of FOLFOX + Avastin and repeat scans. Cycle # 6 of FOLFOX + Avastin was on 02/02/2016.  on 02/02/2016. Cycle # 7 of FOLFOX + Avastin was on 02/16/2016.  on 02/16/2016. Cycle # 8 of FOLFOX + Avastin was on 03/01/2016.  on 03/01/2016. Cycle # 9 of FOLFOX + Avastin was on 03/15/2016.  on 03/15/2016. Cycle # 10 of FOLFOX + Avastin was on 03/29/2016. .4 on 03/29/2016. Cycle # 11 of FOLFOX + Avastin was on 04/12/2016.  .4 on 04/12/2016.     Re-staging scans on 04/19/2016: CT Chest: clear lungs; no evidence of recurring pulmonary nodule; CT Abdomen/Pelvis: Further interval decrease in size of the multiple metastatic hepatic lesions, the largest lesion now measures 3.9 x 3.5 cm and previously measured 4.5 cm in maximum diameter. No mesenteric lymphadenopathy is identified; Bone Scan: No evidence of osseous metastasis. Continue same regimen and re-stage in 2-3 months. Cycle # 12 FOLFOX + Avastin was on 04/26/2016. .5 on 04/26/2016. Cycle # 13 FOLFOX + Avastin was on 05/10/2016. .3 on 05/10/2016. Cycle # 14 FOLFOX+AVASTIN was on 05/24/2016. .5 on 05/24/2016. Cycle # 15 FOLFOX + Avastin was on 06/07/2016. CEA 90.5 on 06/07/2016. Admitted to [facility] 06/13/2016-06/16/2016 for abdominal pain: EGD noted 1.5 cm clean based duodenal bulb ulceration s/p epinephrine and bicap per Dr. Gonzalez Section. No active bleeding. A. Stomach, biopsy: Mild chronic gastritis, immunostain negative for Helicobacter  B. Esophagus, biopsy: Gastric glandular mucosa with prominent ntestinal metaplasia ([place]'s epithelium), negative for epithelial dysplasia, esophageal squamous mucosa not identified     Cycle # 16 FOLFOX (discontinued avastin (bevacizumab) given association of peptic ulcer disease and known association of GI perforation.) was on 06/21/2016. CEA 47 on 06/21/2016. Cycle # 17 FOLFOX was on 07/05/2016. CEA 52.6 on 07/05/2016. CEA 47.6 on 07/19/2016.     Re-staging scans 07/19/2106: CT Chest negative for metastatic disease. CT Abdomen/Pelvis: Stable hepatic lesions; Question new mural thickening in the cecum. Bone Scan: New Let hip lesion suspicious for bone metastasis.     Increased CEA; new mural thickening in the cecum and new left hip lesion suspicious for bone metastasis are consistent with disease progression; He derived maximum benefit from FOLFOX/AVASTIN. D/C FOLFOX/AVASTIN. We recommended FOLFIRI second line therapy. Cycle # 1 FOLFIRI was on 08/02/2016. CEA 40.8 on 08/02/2016. Xgeva q4 weeks started on 08/02/2016. Cycle # 2 FOLFIRI was on 08/16/2016.  CEA 31.7 diaphysis, again suggestive of metastatic disease  Decreased subtle uptake within the medial right acetabulum. Decreased uptake within the left anterior sixth rib corresponding to linear sclerosis on the recent CT, favored to be related to an joint rib fracture. Continue FOLFIRI + Avastin and repeat scans in 3 months. Cycle # 13 FOLFIRI + Avastin was on 01/24/2017. Cycle # 14 FOLFIRI + Avastin was on 02/07/2017. Cycle # 15 FOLFIRI + Avastin was on 02/21/2017. Cycle # 16 FOLFIRI + Avastin was on 03/07/2017. Cycle # 17 FOLFIRI + Avastin was on 03/21/2017. Cycle # 18 FOLFIRI + Avastin was on 04/04/2017. CT chest 04/17/2017 negative for metastatic disease. CT abdomen/pelvis 04/17/2017 Stable hypodense metastatic lesions within the liver. Stable area of increased sclerosis along the right acetabulum  Bone scan 04/17/2017 Stable subtle uptake within the left proximal femoral diaphysis; No definite abnormal uptake in the region of a sclerotic lesion along the posterior column of the right acetabulum noted on CT. Stable slight uptake within the left anterior sixth rib corresponding to linear sclerosis on the recent CT, favored to be related to a fracture. Continue FOLFIRI + Avastin and repeat scans in 3 months. Cycle # 19 FOLFIRI + Avastin was on 04/18/2017. CEA 7.5 on 04/18/2017. Cycle # 20 FOLFIRI + Avastin was on 05/02/2017. CEA 7.7 on 05/02/2017. Cycle # 21 FOLFIRI + Avastin was on 05/16/2017. CEA 7.1 on 05/16/2017. Cycle # 22 FOLFIRI + Avastin was on 05/30/2017. CEA 8.2 on 05/30/2017. Cycle # 23 FOLFIRI + Avastin was on 06/13/2017. CEA 7.7 on 06/13/2017. Cycle # 24 FOLFIRI + Avastin was on 06/27/2017. CEA 6.6 on 06/27/2017.    Re-staging scans 07/05/2017:  Bone scan stable proximal left femoral diaphysis, right acetabulum, and left anterior sixth rib lesions;  CT abdomen/pelvis stable hypodense metastatic lesions within the liver; CT chest without convincing evidence of metastatic disease. Cycle # 25 FOLFIRI + Avastin was on 07/11/2017. CEA 6.3 on 07/11/2017. Cycle # 26 FOLFIRI + Avastin was on 07/25/2017. CEA 7.3 on 07/25/2017. Cycle # 27 FOLFIRI + Avastin was on 08/08/2017. CEA 6.5 on 08/08/2017. Cycle # 28 FOLFIRI + Avastin was on 08/22/2017. CEA 5.9 on 08/22/2017. Cycle # 29 FOLFIRI + Avastin was on 09/05/2017. CEA 6.3 on 09/05/2017. Cycle # 30 FOLFIRI + Avastin was on 09/19/2017. CEA 6.3 on 09/19/2017. Bone scan 09/26/2017 stable. CT abdomen/pelvis on 09/26/2017 Stable hypodense mass in the liver. Stable sclerotic lesion in the acetabulum. CT chest 09/26/2017 negative for metastatic disease. Cycle # 31 FOLFIRI + Avastin was on 10/03/2017. CEA 7.4 on 10/03/2017. Cycle # 32 FOLFIRI + Avastin was on 10/17/2017. CEA 6.0 on 10/17/2017. Cycle # 33 FOLFIRI + Avastin was on 10/31/2017. CEA 6.8 on 10/31/2017. Cycle # 34 FOLFIRI + Avastin was on 11/14/2017. CEA 7.2 on 11/14/2017. Cycle # 35 FOLFIRI + Avastin was on 11/28/2017. CEA 5.1 on 11/28/2017. Cycle # 36 FOLFIRI + Avastin was on 12/12/2017. CEA 6.1 on 12/12/2017. Bone scan 12/22/2017 noted no evidence of metastatic disease to the axial or appendicular skeleton. CT chest 12/22/2017 noted no evidence of metastatic disease. CT abdomen/pelvis 12/22/2017 noted stable hypodense lesions in the liver. Continue FOLFIRI + Avastin and repeat scans in 3 months. Cycle # 37 FOLFIRI + Avastin was on 12/27/2018. CEA 6.7 on 12/27/2017. Cycle # 38 FOLFIRI + Avastin was on 01/09/2018. CEA 5.0 on 01/09/2018. Cycle # 39 FOLFIRI + Avastin was on 01/23/2018. CEA 6.5 on 01/23/2018. Cycle # 40 FOLFIRI + Avastin was on 02/06/2018. CEA 6.9 on 02/06/2018. Cycle # 41 FOLFIRI + Avastin was on 02/20/2018. CEA 6.4 on 02/20/2018. Cycle # 42 FOLFIRI + Avastin was on 03/06/2018. CEA 6.6 on 03/06/2018. Cycle # 43 FOLFIRI + Avastin was on 03/20/2018. CEA 5.7 on 03/20/2018. Bone scan on 03/26/2018 negative for metastatic disease.   CT chest 03/26/2018 noted ? new 7 mm nodule in LUCY. CT abdomen/pelvis 03/26/2018 noted stable hypodense lesions in the liver. ? New small ascites in the abdomen. Patient wants to continue to monitor the lung finding and repeat scans in 2 months instead of changing the current chemotherapy regimen to oral Lonsurf. Cycle # 44 FOLFIRI + Avastin was on 04/03/2018. CEA 6.3 on 04/03/2018. Cycle # 45 FOLFIRI + Avastin was on 04/24/2018. CEA 5.3 on 04/24/2018. Cycle # 46 FOLFIRI + Avastin was on 05/08/2018. CEA 5.4 on 05/08/2018. Cycle # 47 FOLFIRI + Avastin was on 05/22/2018. CEA 6.1 on 05/22/2018. CT chest 05/31/2018 noted stable nodule in LUCY. No evidence of worsening malignancy. CT abdomen/pelvis on 05/31/2018 noted stable liver metastasis. No evidence of worsening malignancy. Continue FOLFIRI + Avastin and repeat scans in 3 months. Cycle # 48 FOLFIRI + Avastin was on 06/06/2018. CEA 6.3 on 06/05/2018. Cycle # 49 FOLFIRI + Avastin was on 06/19/2018. CEA 6.1 on 06/19/2018. Cycle # 50 FOLFIRI + Avastin was on 07/03/2018. CEA 7.4 on 07/03/2018. Cycle # 51 FOLFIRI + Avastin was on 07/24/2018. CEA 6.7 on 07/24/2018. Cycle # 52 FOLFIRI + Avastin was on 08/14/2018. CEA 6.8 on 08/14/2018. Cycle # 53 FOLFIRI + Avastin was on 08/28/2018. CEA 6.5 on 08/27/2018. CT chest 09/06/2018 noted interval decrease in size of LUCY measuring up to 4 mm, suggestive of treatment response. No mediastinal or hilar LN  CT abdomen/pelvis 09/06/2018 Slight interval increase in size of a previously noted metastatic lesion within the right hepatic lobe. This may be related to differences in phase of enhancement compared to the most recent study from 5/31/2018, the lesion remains decreased in size compared to the more previous studies. No abdominal, retroperitoneal, or pelvic lymphadenopathy. Continue FOLFIRI + Avastin and repeat scans in 2 months. Cycle # 54 FOLFIRI + Avastin was on 09/11/2018. CEA 6.4 on 09/10/2018.   Cycle # 55 FOLFIRI + Avastin was on 09/25/2018. CEA 6.4 on 09/25/2018. Cycle # 56 FOLFIRI + Avastin was on 10/09/2018. CEA 6.7 on 10/08/2018. Cycle # 57 FOLFIRI + Avastin was on 10/23/2018. CEA 7.2 on 10/22/2018. CT chest 11/02/2018 stable 3 mm nodule within LUCY. No new right and left lung nodule. No mediastinal or osseous lesion. CT abdomen/pelvis 11/02/2018 stable metastatic disease to liver. No new metastatic disease identified. No mesenteric or retroperitoneal LN. No gross colonic lesion identified. Continue FOLFIRI + Avastin and repeat scans in 3 months. Cycle # 58 FOLFIRI + Avastin was on 11/06/2018. CEA 7.6 on 11/05/2018. Cycle # 59 FOLFIRI + Avastin was on 11/27/2018. CEA 6.9 on 11/26/2018. Cycle # 60 FOLFIRI + Avastin was on 12/11/2018. CEA 6.7 on 12/11/2018. Cycle # 61 FOLFIRI + Avastin was on 01/08/2019. CEA 5.6 on 01/07/2019. Cycle # 62 FOLFIRI + Avastin was on 01/29/2019. CEA 4.6 on 01/28/2019. Cycle # 63 FOLFIRI + Avastin was on 02/12/2019. CEA 4.3 on 02/11/2019. CT chest 02/21/2019 no evidence of metastatic disease. CT abdomen/pelvis 02/21/2019 stable hypodense liver lesions. No evidence of worsening metastatic disease. 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 Ordered MRI thoracic spine and referred to neurosurgery team.  Cycle # 64 FOLFIRI + Avastin was on 02/26/2019. CEA 4.7 on 02/25/2019. Cycle # 65 FOLFIRI + Avastin was on 03/12/2019. CEA 4.0 on 03/11/2019. Cycle # 66 FOLFIRI + Avastin was on 03/26/2019. CEA 4.7 on 03/25/2019. Cycle # 67 FOLFIRI + Avastin was on 04/09/2019. CEA 5.6 on 04/08/2019. Cycle # 68 FOLFIRI + Avastin is today 04/30/2019. CEA 4.9 on 04/29/2019. RTC 2 weeks for Cycle # 69 FOLFIRI + Avastin. Scans after next visit.    MSI testing noted no mismatch repair protein loss of expression    4/30/2019  Lance Ewing MD  Board Certified Medical Oncologist

## 2019-05-02 ENCOUNTER — HOSPITAL ENCOUNTER (OUTPATIENT)
Dept: INFUSION THERAPY | Age: 69
Discharge: HOME | End: 2019-05-02
Payer: MEDICARE

## 2019-05-02 DIAGNOSIS — C20 RECTAL CANCER METASTASIZED TO LIVER (HCC): Primary | ICD-10-CM

## 2019-05-02 DIAGNOSIS — C78.7 RECTAL CANCER METASTASIZED TO LIVER (HCC): Primary | ICD-10-CM

## 2019-05-02 PROCEDURE — 6360000002 HC RX W HCPCS: Performed by: INTERNAL MEDICINE

## 2019-05-02 PROCEDURE — 96372 THER/PROPH/DIAG INJ SC/IM: CPT

## 2019-05-02 RX ADMIN — PEGFILGRASTIM 6 MG: 6 INJECTION SUBCUTANEOUS at 14:21

## 2019-05-13 ENCOUNTER — HOSPITAL ENCOUNTER (OUTPATIENT)
Age: 69
Discharge: HOME | End: 2019-05-13
Payer: MEDICARE

## 2019-05-13 LAB
ALBUMIN SERPL-MCNC: 3.4 G/DL (ref 3.5–5.2)
ALP BLD-CCNC: 178 U/L (ref 40–129)
ALT SERPL-CCNC: 21 U/L (ref 0–40)
ANION GAP SERPL CALCULATED.3IONS-SCNC: 8 MMOL/L (ref 7–16)
AST SERPL-CCNC: 26 U/L (ref 0–39)
BASOPHILS ABSOLUTE: 0 E9/L (ref 0–0.2)
BASOPHILS RELATIVE PERCENT: 0 % (ref 0–2)
BILIRUB SERPL-MCNC: 0.7 MG/DL (ref 0–1.2)
BUN BLDV-MCNC: 16 MG/DL (ref 8–23)
CALCIUM SERPL-MCNC: 9.6 MG/DL (ref 8.6–10.2)
CEA: 5.3 NG/ML (ref 0–5.2)
CHLORIDE BLD-SCNC: 108 MMOL/L (ref 98–107)
CO2: 27 MMOL/L (ref 22–29)
CREAT SERPL-MCNC: 1 MG/DL (ref 0.7–1.2)
EOSINOPHILS ABSOLUTE: 0.12 E9/L (ref 0.05–0.5)
EOSINOPHILS RELATIVE PERCENT: 5 % (ref 0–6)
GFR AFRICAN AMERICAN: >60
GFR NON-AFRICAN AMERICAN: >60 ML/MIN/1.73
GLUCOSE BLD-MCNC: 134 MG/DL (ref 74–99)
HCT VFR BLD CALC: 27.9 % (ref 37–54)
HEMOGLOBIN: 8.8 G/DL (ref 12.5–16.5)
LYMPHOCYTES ABSOLUTE: 0.43 E9/L (ref 1.5–4)
LYMPHOCYTES RELATIVE PERCENT: 18 % (ref 20–42)
MCH RBC QN AUTO: 32.8 PG (ref 26–35)
MCHC RBC AUTO-ENTMCNC: 31.5 % (ref 32–34.5)
MCV RBC AUTO: 104.1 FL (ref 80–99.9)
MONOCYTES ABSOLUTE: 0.02 E9/L (ref 0.1–0.95)
MONOCYTES RELATIVE PERCENT: 1 % (ref 2–12)
NEUTROPHILS ABSOLUTE: 1.82 E9/L (ref 1.8–7.3)
NEUTROPHILS RELATIVE PERCENT: 76 % (ref 43–80)
PDW BLD-RTO: 17.3 FL (ref 11.5–15)
PLATELET # BLD: 79 E9/L (ref 130–450)
PLATELET CONFIRMATION: NORMAL
PMV BLD AUTO: 9.8 FL (ref 7–12)
POTASSIUM SERPL-SCNC: 4.2 MMOL/L (ref 3.5–5)
RBC # BLD: 2.68 E12/L (ref 3.8–5.8)
RBC # BLD: NORMAL 10*6/UL
SODIUM BLD-SCNC: 143 MMOL/L (ref 132–146)
TOTAL PROTEIN: 6 G/DL (ref 6.4–8.3)
WBC # BLD: 2.4 E9/L (ref 4.5–11.5)

## 2019-05-13 PROCEDURE — 82378 CARCINOEMBRYONIC ANTIGEN: CPT

## 2019-05-13 PROCEDURE — 85025 COMPLETE CBC W/AUTO DIFF WBC: CPT

## 2019-05-13 PROCEDURE — 80053 COMPREHEN METABOLIC PANEL: CPT

## 2019-05-13 PROCEDURE — 36415 COLL VENOUS BLD VENIPUNCTURE: CPT

## 2019-05-14 ENCOUNTER — OFFICE VISIT (OUTPATIENT)
Dept: ONCOLOGY | Age: 69
End: 2019-05-14
Payer: MEDICARE

## 2019-05-14 ENCOUNTER — HOSPITAL ENCOUNTER (OUTPATIENT)
Dept: INFUSION THERAPY | Age: 69
Discharge: HOME | End: 2019-05-14
Payer: MEDICARE

## 2019-05-14 VITALS
HEART RATE: 63 BPM | RESPIRATION RATE: 18 BRPM | SYSTOLIC BLOOD PRESSURE: 113 MMHG | TEMPERATURE: 98.6 F | DIASTOLIC BLOOD PRESSURE: 71 MMHG

## 2019-05-14 VITALS
TEMPERATURE: 97.8 F | DIASTOLIC BLOOD PRESSURE: 72 MMHG | HEIGHT: 73 IN | WEIGHT: 197 LBS | RESPIRATION RATE: 20 BRPM | BODY MASS INDEX: 26.11 KG/M2 | HEART RATE: 59 BPM | SYSTOLIC BLOOD PRESSURE: 121 MMHG

## 2019-05-14 DIAGNOSIS — C20 RECTAL CANCER (HCC): Primary | ICD-10-CM

## 2019-05-14 DIAGNOSIS — C78.7 RECTAL CANCER METASTASIZED TO LIVER (HCC): Primary | ICD-10-CM

## 2019-05-14 DIAGNOSIS — C20 RECTAL CANCER METASTASIZED TO LIVER (HCC): Primary | ICD-10-CM

## 2019-05-14 PROCEDURE — 96368 THER/DIAG CONCURRENT INF: CPT

## 2019-05-14 PROCEDURE — 96375 TX/PRO/DX INJ NEW DRUG ADDON: CPT

## 2019-05-14 PROCEDURE — 96411 CHEMO IV PUSH ADDL DRUG: CPT

## 2019-05-14 PROCEDURE — 99214 OFFICE O/P EST MOD 30 MIN: CPT | Performed by: INTERNAL MEDICINE

## 2019-05-14 PROCEDURE — 96415 CHEMO IV INFUSION ADDL HR: CPT

## 2019-05-14 PROCEDURE — 6360000002 HC RX W HCPCS: Performed by: INTERNAL MEDICINE

## 2019-05-14 PROCEDURE — 2580000003 HC RX 258: Performed by: INTERNAL MEDICINE

## 2019-05-14 PROCEDURE — 96417 CHEMO IV INFUS EACH ADDL SEQ: CPT

## 2019-05-14 PROCEDURE — 96413 CHEMO IV INFUSION 1 HR: CPT

## 2019-05-14 RX ORDER — SODIUM CHLORIDE 0.9 % (FLUSH) 0.9 %
10 SYRINGE (ML) INJECTION PRN
Status: CANCELLED | OUTPATIENT
Start: 2019-05-14

## 2019-05-14 RX ORDER — SODIUM CHLORIDE 9 MG/ML
INJECTION, SOLUTION INTRAVENOUS CONTINUOUS
Status: CANCELLED | OUTPATIENT
Start: 2019-05-14

## 2019-05-14 RX ORDER — HEPARIN SODIUM (PORCINE) LOCK FLUSH IV SOLN 100 UNIT/ML 100 UNIT/ML
500 SOLUTION INTRAVENOUS PRN
Status: DISCONTINUED | OUTPATIENT
Start: 2019-05-14 | End: 2019-05-15 | Stop reason: HOSPADM

## 2019-05-14 RX ORDER — FLUOROURACIL 50 MG/ML
850 INJECTION, SOLUTION INTRAVENOUS ONCE
Status: CANCELLED | OUTPATIENT
Start: 2019-05-14

## 2019-05-14 RX ORDER — EPINEPHRINE 1 MG/ML
0.3 INJECTION, SOLUTION, CONCENTRATE INTRAVENOUS PRN
Status: CANCELLED | OUTPATIENT
Start: 2019-05-14

## 2019-05-14 RX ORDER — PALONOSETRON HYDROCHLORIDE 0.05 MG/ML
0.25 INJECTION, SOLUTION INTRAVENOUS ONCE
Status: COMPLETED | OUTPATIENT
Start: 2019-05-14 | End: 2019-05-14

## 2019-05-14 RX ORDER — ATROPINE SULFATE 0.4 MG/ML
0.4 AMPUL (ML) INJECTION ONCE
Status: CANCELLED | OUTPATIENT
Start: 2019-05-14

## 2019-05-14 RX ORDER — METHYLPREDNISOLONE SODIUM SUCCINATE 125 MG/2ML
125 INJECTION, POWDER, LYOPHILIZED, FOR SOLUTION INTRAMUSCULAR; INTRAVENOUS ONCE
Status: CANCELLED | OUTPATIENT
Start: 2019-05-14

## 2019-05-14 RX ORDER — SODIUM CHLORIDE 9 MG/ML
250 INJECTION, SOLUTION INTRAVENOUS CONTINUOUS
Status: CANCELLED | OUTPATIENT
Start: 2019-05-14

## 2019-05-14 RX ORDER — PALONOSETRON HYDROCHLORIDE 0.05 MG/ML
0.25 INJECTION, SOLUTION INTRAVENOUS ONCE
Status: CANCELLED | OUTPATIENT
Start: 2019-05-14

## 2019-05-14 RX ORDER — FLUOROURACIL 50 MG/ML
850 INJECTION, SOLUTION INTRAVENOUS ONCE
Status: COMPLETED | OUTPATIENT
Start: 2019-05-14 | End: 2019-05-14

## 2019-05-14 RX ORDER — HEPARIN SODIUM (PORCINE) LOCK FLUSH IV SOLN 100 UNIT/ML 100 UNIT/ML
500 SOLUTION INTRAVENOUS PRN
Status: CANCELLED | OUTPATIENT
Start: 2019-05-14

## 2019-05-14 RX ORDER — ATROPINE SULFATE 0.4 MG/ML
0.4 AMPUL (ML) INJECTION ONCE
Status: COMPLETED | OUTPATIENT
Start: 2019-05-14 | End: 2019-05-14

## 2019-05-14 RX ORDER — DIPHENHYDRAMINE HYDROCHLORIDE 50 MG/ML
50 INJECTION INTRAMUSCULAR; INTRAVENOUS ONCE
Status: CANCELLED | OUTPATIENT
Start: 2019-05-14

## 2019-05-14 RX ORDER — SODIUM CHLORIDE 0.9 % (FLUSH) 0.9 %
10 SYRINGE (ML) INJECTION PRN
Status: DISCONTINUED | OUTPATIENT
Start: 2019-05-14 | End: 2019-05-15 | Stop reason: HOSPADM

## 2019-05-14 RX ORDER — SODIUM CHLORIDE 9 MG/ML
250 INJECTION, SOLUTION INTRAVENOUS CONTINUOUS
Status: DISCONTINUED | OUTPATIENT
Start: 2019-05-14 | End: 2019-05-15 | Stop reason: HOSPADM

## 2019-05-14 RX ORDER — DEXAMETHASONE SODIUM PHOSPHATE 10 MG/ML
10 INJECTION, SOLUTION INTRAMUSCULAR; INTRAVENOUS ONCE
Status: CANCELLED | OUTPATIENT
Start: 2019-05-14

## 2019-05-14 RX ORDER — DEXAMETHASONE SODIUM PHOSPHATE 10 MG/ML
10 INJECTION INTRAMUSCULAR; INTRAVENOUS ONCE
Status: COMPLETED | OUTPATIENT
Start: 2019-05-14 | End: 2019-05-14

## 2019-05-14 RX ORDER — 0.9 % SODIUM CHLORIDE 0.9 %
10 VIAL (ML) INJECTION ONCE
Status: CANCELLED | OUTPATIENT
Start: 2019-05-14

## 2019-05-14 RX ADMIN — PALONOSETRON 0.25 MG: 0.25 INJECTION, SOLUTION INTRAVENOUS at 10:26

## 2019-05-14 RX ADMIN — Medication 500 UNITS: at 13:48

## 2019-05-14 RX ADMIN — BEVACIZUMAB 400 MG: 400 INJECTION, SOLUTION INTRAVENOUS at 10:51

## 2019-05-14 RX ADMIN — ATROPINE SULFATE 0.4 MG: 0.4 INJECTION, SOLUTION INTRAMUSCULAR; INTRAVENOUS; SUBCUTANEOUS at 10:27

## 2019-05-14 RX ADMIN — LEUCOVORIN CALCIUM 850 MG: 10 INJECTION INTRAMUSCULAR; INTRAVENOUS at 11:37

## 2019-05-14 RX ADMIN — FLUOROURACIL 850 MG: 50 INJECTION, SOLUTION INTRAVENOUS at 13:43

## 2019-05-14 RX ADMIN — SODIUM CHLORIDE 400 MG: 9 INJECTION, SOLUTION INTRAVENOUS at 11:39

## 2019-05-14 RX ADMIN — DEXAMETHASONE SODIUM PHOSPHATE 10 MG: 10 INJECTION INTRAMUSCULAR; INTRAVENOUS at 10:34

## 2019-05-14 RX ADMIN — Medication 10 ML: at 13:48

## 2019-05-14 RX ADMIN — SODIUM CHLORIDE 250 ML: 9 INJECTION, SOLUTION INTRAVENOUS at 10:24

## 2019-05-14 NOTE — PROGRESS NOTES
[place] [id number]  Attending Clinic Note     Reason for Visit: Follow-up on a patient with Metastatic Rectal Cancer.     PCP: Lavell Ibarra MD     History of Present Illness:  77 y/o  male who was referred to see Dr. Luisa Garcia (GI team) for evaluation of bright red blood per rectum, mild anemia and change in bowel habits with diarrhea. CEA 2640 on 10/19/2015. AlcP 299 AST 57 ALT 75 on 10/19/2015. Colonoscopy in 2013 noted no significant polyps, colitis or lesions at that time. Denies any Family History of colorectal cancer or polyps.     Colonoscopy on 10/19/2015 revealed:  1. Ascending polyp, 8 mm, hot snare: Tubulovillous adenoma. 2. Transverse polyp, 1 cm, hot snare: Serrated polyp most consistent with sessile serrated adenoma. 3. Five splenic flexure polyps, three - 5 mm, 7 mm, 8 mm, hot snare and biopsy: Four segments of Tubular Adenoma  4. Descending polyp, 5 mm, biopsy: Tubular adenoma. 5. Sigmoid polyp, 4 mm biopsy, Serrated polyp most consistent with sessile serrated adenoma. 6. Two rectal polyps, 4 mm biopsy: Serrated polyp most consistent with hyperplastic polyp. 7. Rectosigmoid colon mass (large mass approximately 65% circumference of the lumen; Very friable, firm and hard): Tubulovillous adenoma with associated focal erosion and fibroplasia.      CT scan abdomen/pelvis on 10/26/2015:  1. Small nodules at lung bases likely represent metastatic colon   cancer. 2. Extensive likely metastatic colon cancer throughout the liver.      3. Mild mural thickening and luminal narrowing in the   terminal ileum, otherwise nonspecific.      Colonoscopy with snare removal rectal mass was performed by Dr. Mirza Hall. Pathology proved:  Rectal polyp: Invasive adenocarcinoma involving villous adenoma and extending to the cauterized edge of excision. KRAS Mutation: Mutation detected. BRAF Mutation: Mutation not detected.   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     CEA 3488. Bone scan on 11/10/2015 noted no metastatic disease. CT chest on 11/10/2015 revealed Multiple pulmonary nodules in the upper and lower lobes consistent with metastatic disease;   Hepatic metastasis also visualized. 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 Mediport was placed. Cycle # 1 of FOLFOX + Avastin was on 11/23/2015. CEA was 4555 on 11/23/2015. Cycle # 2 of FOLFOX + Avastin was on 12/08/2015. CEA was 3160 on 12/08/2015. Cycle # 3 of FOLFOX + Avastin was on 12/22/2015. CEA was 2516 on 12/21/2015. Cycle # 4 of FOLFOX + Avastin was on 01/05/2016. CEA was 2098 on 01/05/2015. Cycle # 5 of FOLFOX + Avastin was on 01/19/2016. CEA was 1511 on 01/05/2015.     -Bone scan on 01/26/2016 noted no metastatic disease. CT chest on 01/26/2016 revealed Significant response to treatment with no visible residual nodules. CT scan abdomen/pelvis on 01/26/2016 noted Interval decreased size of multiple masses in the liver compatible with treatment response. Continue another 2 months of FOLFOX + Avastin and repeat scans. Cycle # 6 of FOLFOX + Avastin was on 02/02/2016.  on 02/02/2016. Cycle # 7 of FOLFOX + Avastin was on 02/16/2016.  on 02/16/2016. Cycle # 8 of FOLFOX + Avastin was on 03/01/2016.  on 03/01/2016. Cycle # 9 of FOLFOX + Avastin was on 03/15/2016.  on 03/15/2016. Cycle # 10 of FOLFOX + Avastin was on 03/29/2016. .4 on 03/29/2016. Cycle # 11 of FOLFOX + Avastin was on 04/12/2016. .4 on 04/12/2016.     Re-staging scans on 04/19/2016: CT Chest: clear lungs; no evidence of recurring pulmonary nodule; CT Abdomen/Pelvis: Further interval decrease in size of the multiple metastatic hepatic lesions, the largest lesion now measures 3.9 x 3.5 cm and previously measured 4.5 cm in maximum diameter. No mesenteric lymphadenopathy is identified; Bone Scan: No evidence of osseous metastasis.   Continue same regimen and re-stage in 2-3 months. Cycle # 12 FOLFOX + Avastin was on 04/26/2016. .5 on 04/26/2016. Cycle # 13 FOLFOX + Avastin was on 05/10/2016. .3 on 05/10/2016. Cycle # 14 FOLFOX+AVASTIN was on 05/24/2016. .5 on 05/24/2016. Cycle # 15 FOLFOX + Avastin was on 06/07/2016. CEA 90.5 on 06/07/2016. Admitted to [facility] 06/13/2016-06/16/2016 for abdominal pain: EGD noted 1.5 cm clean based duodenal bulb ulceration s/p epinephrine and bicap per Dr. Gonzalez Section. No active bleeding. A. Stomach, biopsy: Mild chronic gastritis, immunostain negative for Helicobacter  B. Esophagus, biopsy: Gastric glandular mucosa with prominent intestinal metaplasia ([place]'s epithelium), negative for epithelial dysplasia, esophageal squamous mucosa not identified  Cycle # 16 FOLFOX (discontinued avastin (bevacizumab) given association of peptic ulcer disease and known association of GI perforation) was on 06/21/2016. Cycle # 17 FOLFOX was on 07/05/2016. CEA 52.6 on 07/19/2016. Cycle # 17 FOLFOX was on 07/05/2016. CEA 52.6 on 07/05/2016. CEA 47.6 on 07/19/2016.     Re-staging scans 07/19/2106: CT Chest negative for metastatic disease. CT Abdomen/Pelvis: Stable hepatic lesions; Question new mural thickening in the cecum. Bone Scan: New Let hip lesion suspicious for bone metastasis.     Increased CEA; new mural thickening in the cecum and new left hip lesion suspicious for bone metastasis are consistent with disease progression; He derived maximum benefit from FOLFOX/AVASTIN. D/C FOLFOX/AVASTIN. We recommended FOLFIRI second line therapy. Cycle # 1 FOLFIRI was on 08/02/2016. CEA 40.8 on 08/02/2016. Xgeva q4 weeks started on 08/02/2016. Cycle # 2 FOLFIRI was on 08/16/2016. CEA 31.7 on 08/16/2016. Cycle # 3 FOLFIRI (added Avastin) was on 08/30/2016 given that ulcers healed on EGD 08/15/2016 by Dr. Rosalie Hewitt; Protonix bid since avastin re-started. Colonoscopy on 08/29/2016 (to look at the cecal area) unremarkable.  CEA 26.7 on 08/30/2016. Cycle # 4 FOLFIRI/Avastin was on 09/13/2016. CEA 23.4 on 09/13/2016. Cycle # 5 FOLFIRI/Avastin was on 09/27/2016. CEA 22.3 on 09/27/2016. Cycle # 6 FOLFIRI/Avastin was on 10/11/2016. CEA 18.4 on 10/11/2016.      Bone scan 10/21/2016 stable bone metastasis; ? New lesion anterior left sixth rib likely interval healing fracture. CT abdomen/pelvis revealed stable hepatic metastasis. CT chest negative for metastatic disease. Continue FOLFIRI/Avastin and repeat scans in 3 months. Cycle # 7 FOLFIRI/Avastin was on 10/25/2016. CEA 16.1  Cycle # 8 FOLFIRI/Avastin was on 11/08/2016. CEA 15.7  Cycle # 9 FOLFIRI/Avastin was on 11/29/2016. CEA 13.6 on 11/29/2016. Cycle # 10 FOLFIRI/Avastin was on 12/13/2016. CEA 10.6 on 12/13/2016. Cycle # 11 FOLFIRI/Avastin was on 12/27/2016. CEA 11.1 on 12/27/2016. Cycle # 12 FOLFIRI/Avastin was on 01/10/2017. CEA 9.7 on 01/10/2017.       CT chest 01/23/2017 No new pulmonary nodule or lymphadenopathy. Patchy groundglass opacities within the left lower lobe, suggestive of infectious or inflammatory etiology. Followup CT chest in 3 months. Slight increased linear sclerosis along the left anterior sixth rib corresponding to an area of increased uptake on the prior bone scan from 10/21/2016, favored to be related to interval healing changes of a nondisplaced fracture. CT abdomen/pelvis on 01/23/2017 Redemonstration of multiple hepatic metastases, which are similar compared to the prior CT from 10/21/2016. Redemonstration of area of increased sclerosis along the right acetabulum suspicious for metastatic disease. Bone scan on 01/23/2017 Stable subtle uptake within the left proximal diaphysis, again suggestive of metastatic disease  Decreased subtle uptake within the medial right acetabulum. Decreased uptake within the left anterior sixth rib corresponding to linear sclerosis on the recent CT, favored to be related to an joint rib fracture.     Continue FOLFIRI + Avastin and repeat scans in 3 months. Cycle # 13 FOLFIRI + Avastin was on 01/24/2017. Cycle # 14 FOLFIRI + Avastin was on 02/07/2017. Cycle # 15 FOLFIRI + Avastin was on 02/21/2017. Cycle # 16 FOLFIRI + Avastin was on 03/07/2017. Cycle # 17 FOLFIRI + Avastin was on 03/21/2017. Cycle # 18 FOLFIRI + Avastin was on 04/04/2017. CT chest 04/17/2017 negative for metastatic disease. CT abdomen/pelvis 04/17/2017 Stable hypodense metastatic lesions within the liver. Stable area of increased sclerosis along the right acetabulum  Bone scan 04/17/2017 Stable subtle uptake within the left proximal femoral diaphysis; No definite abnormal uptake in the region of a sclerotic lesion along the posterior column of the right acetabulum noted on CT. Stable slight uptake within the left anterior sixth rib corresponding to linear sclerosis on the recent CT, favored to be related to a fracture. Continue FOLFIRI + Avastin and repeat scans in 3 months. Cycle # 19 FOLFIRI + Avastin was on 04/18/2017. Cycle # 20 FOLFIRI + Avastin was on 05/02/2017. Cycle # 21 FOLFIRI + Avastin was on 05/16/2017. Cycle # 22 FOLFIRI + Avastin was on 05/30/2017. Cycle # 23 FOLFIRI + Avastin was on 06/13/2017. Cycle # 24 FOLFIRI + Avastin was on 06/27/2017. Cycle # 25 FOLFIRI + Avastin was on 07/11/2017. Cycle # 26 FOLFIRI + Avastin was on 07/25/2017. Cycle # 27 FOLFIRI + Avastin was on 08/08/2017. Cycle # 28 FOLFIRI + Avastin was on 08/22/2017. Cycle # 29 FOLFIRI + Avastin was on 09/05/2017. Cycle # 30 FOLFIRI + Avastin was on 09/19/2017. Bone scan 09/26/2017 stable. CT abdomen/pelvis on 09/26/2017 Stable hypodense mass in the liver. Stable sclerotic lesion in the acetabulum. CT chest 09/26/2017 negative for metastatic disease. Cycle # 31 FOLFIRI + Avastin was on 10/03/2017. CEA 7.4 on 10/03/2017. Cycle # 32 FOLFIRI + Avastin was on 10/17/2017. CEA 6.0 on 10/17/2017. Cycle # 33 FOLFIRI + Avastin was on 10/31/2017.  CEA 6.8 on Avastin was on 07/24/2018. Cycle # 52 FOLFIRI + Avastin was on 08/14/2018. Cycle # 53 FOLFIRI + Avastin was on 08/28/2018. CT chest 09/06/2018 noted interval decrease in size of LUCY measuring up to 4 mm, suggestive of treatment response. No mediastinal or hilar LN  CT abdomen/pelvis 09/06/2018 Slight interval increase in size of a previously noted metastatic lesion within the right hepatic lobe. This may be related to differences in phase of enhancement compared to the most recent study from 5/31/2018, the lesion remains decreased in size compared to the more previous studies. No abdominal, retroperitoneal, or pelvic lymphadenopathy. Continue FOLFIRI + Avastin and repeat scans in 2 months. Cycle # 54 FOLFIRI + Avastin was on 09/11/2018. Cycle # 55 FOLFIRI + Avastin was on 09/25/2018. Cycle # 56 FOLFIRI + Avastin was on 10/09/2018. Cycle # 57 FOLFIRI + Avastin was on 10/23/2018. CT chest 11/02/2018 stable 3 mm nodule within LUCY. No new right and left lung nodule. No mediastinal or osseous lesion. CT abdomen/pelvis 11/02/2018 stable metastatic disease to liver. No new metastatic disease identified. No mesenteric or retroperitoneal LN. No gross colonic lesion identified. Continue FOLFIRI + Avastin and repeat scans in 3 months. Cycle # 58 FOLFIRI + Avastin was on 11/06/2018. CEA 7.6 on 11/05/2018. Cycle # 59 FOLFIRI + Avastin was on 11/27/2018. CEA 6.9 on 11/26/2018. Cycle # 60 FOLFIRI + Avastin was on 12/11/2018. CEA 6.7 on 12/11/2018. Cycle # 61 FOLFIRI + Avastin was on 01/08/2019. CEA 5.6 on 01/07/2019. Cycle # 62 FOLFIRI + Avastin was on 01/29/2019. CEA 4.6 on 01/28/2019. Cycle # 63 FOLFIRI + Avastin was on 02/12/2019. CEA 4.3 on 02/11/2019. CT chest 02/21/2019 no evidence of metastatic disease. CT abdomen/pelvis 02/21/2019 stable hypodense liver lesions. No evidence of worsening metastatic disease. 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  Ordered MRI thoracic spine and referred to neurosurgery team.    Cycle # 64 FOLFIRI + Avastin was on 02/26/2019. CEA 4.7 on 02/25/2019. Cycle # 65 FOLFIRI + Avastin was on 03/12/2019. CEA 4.0 on 03/11/2019. Cycle # 66 FOLFIRI + Avastin was on 03/26/2019. CEA 4.7 on 03/25/2019. Cycle # 67 FOLFIRI + Avastin was on 04/09/2019. CEA 5.6 on 04/08/2019. Cycle # 68 FOLFIRI + Avastin was on 04/30/2019. CEA 4.9 on 04/29/2019. Review of Systems;  CONSTITUTIONAL: No fever, chills. Fair appetite and energy level. ENMT: Eyes: no diplopia; Nose: No epistaxis. Mouth: No oral lesions. RESPIRATORY: No hemoptysis, shortness of breath, cough. CARDIOVASCULAR: No chest pain, palpitations. GASTROINTESTINAL: No nausea/vomiting, abdominal pain. Intermittent diarrhea. GENITOURINARY: No dysuria, urinary frequency, hematuria. NEURO: No syncope, presyncope, headache. Remainder ROS: Negative. Past Medical History:   Past Medical History               Diagnosis Date    Arthritis      Cancer ([facility] [address]         colon    Depression      Hyperlipidemia      Hypertension           Medications:  Reviewed and reconciled.     Allergies: Allergies   Allergen Reactions    Neosporin [Neomycin-Polymyxin-Gramicidin] Rash    Tape Kathlee Nye Tape] Rash      Physical Exam:  /72 (Site: Left Upper Arm, Position: Sitting, Cuff Size: Medium Adult)   Pulse 59   Temp 97.8 °F (36.6 °C) (Oral)   Resp 20   Ht 6' 1\" (1.854 m)   Wt 197 lb (89.4 kg)   BMI 25.99 kg/m²   GENERAL: Alert, oriented x 3, not in acute distress. HEENT: PERRLA, EOMI. No oral lesions. NECK: Supple. Without lymphadenopathy. LUNGS: Lungs are CTA bilaterally, with no wheezing, crackles or rhonchi. CARDIOVASCULAR: Regular rhythm. No murmurs, rubs or gallops. ABDOMEN: Soft. Non-tender, non-distended. Positive bowel sounds. EXTREMITIES: Without clubbing, cyanosis, or edema. NEUROLOGIC: No focal deficits.    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 KRAS Mutation: Mutation detected. BRAF Mutation: Mutation not detected. 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 CT scan abdomen/pelvis on 10/26/2015 revealed small nodules at the lung bases, extensive liver lesions consistent with metastatic rectosigmoid cancer. CEA 3488 on 10/30/2015. Bone scan on 11/10/2015 noted no metastatic disease. CT chest on 11/10/2015 revealed Multiple pulmonary nodules in the upper and lower lobes consistent with metastatic disease; Hepatic metastasis also visualized. 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 Mediport was placed. Cycle # 1 of FOLFOX + Avastin was on 11/23/2015. CEA was 4555 on 11/23/2015. Cycle # 2 of FOLFOX + Avastin was on 12/08/2015. CEA was 3160 on 12/08/2015. Cycle # 3 of FOLFOX + Avastin was on 12/22/2015. CEA was 2516 on 12/21/2015. Cycle # 4 of FOLFOX + Avastin was on 01/05/2016. CEA was 2098 on 01/05/2015. Cycle # 5 of FOLFOX + Avastin was on 01/19/2016. CEA was 1511 on 01/05/2015.     -Bone scan on 01/26/2016 noted no metastatic disease. CT chest on 01/26/2016 revealed Significant response to treatment with no visible residual nodules. CT scan abdomen/pelvis on 01/26/2016 noted Interval decreased size of multiple masses in the liver compatible with treatment response. Continue another 2 months of FOLFOX + Avastin and repeat scans. Cycle # 6 of FOLFOX + Avastin was on 02/02/2016.  on 02/02/2016. Cycle # 7 of FOLFOX + Avastin was on 02/16/2016.  on 02/16/2016. Cycle # 8 of FOLFOX + Avastin was on 03/01/2016.  on 03/01/2016. Cycle # 9 of FOLFOX + Avastin was on 03/15/2016.  on 03/15/2016. Cycle # 10 of FOLFOX + Avastin was on 03/29/2016. .4 on 03/29/2016. Cycle # 11 of FOLFOX + Avastin was on 04/12/2016.  .4 on 04/12/2016.     Re-staging scans on 04/19/2016: CT Chest: clear lungs; no evidence of recurring pulmonary nodule; CT Abdomen/Pelvis: Further interval decrease in size of the multiple metastatic hepatic lesions, the largest lesion now measures 3.9 x 3.5 cm and previously measured 4.5 cm in maximum diameter. No mesenteric lymphadenopathy is identified; Bone Scan: No evidence of osseous metastasis. Continue same regimen and re-stage in 2-3 months. Cycle # 12 FOLFOX + Avastin was on 04/26/2016. .5 on 04/26/2016. Cycle # 13 FOLFOX + Avastin was on 05/10/2016. .3 on 05/10/2016. Cycle # 14 FOLFOX+AVASTIN was on 05/24/2016. .5 on 05/24/2016. Cycle # 15 FOLFOX + Avastin was on 06/07/2016. CEA 90.5 on 06/07/2016. Admitted to [facility] 06/13/2016-06/16/2016 for abdominal pain: EGD noted 1.5 cm clean based duodenal bulb ulceration s/p epinephrine and bicap per Dr. Dileep Jarrett. No active bleeding. A. Stomach, biopsy: Mild chronic gastritis, immunostain negative for Helicobacter  B. Esophagus, biopsy: Gastric glandular mucosa with prominent ntestinal metaplasia (Mad[place]'s epithelium), negative for epithelial dysplasia, esophageal squamous mucosa not identified     Cycle # 16 FOLFOX (discontinued avastin (bevacizumab) given association of peptic ulcer disease and known association of GI perforation.) was on 06/21/2016. CEA 47 on 06/21/2016. Cycle # 17 FOLFOX was on 07/05/2016. CEA 52.6 on 07/05/2016. CEA 47.6 on 07/19/2016.     Re-staging scans 07/19/2106: CT Chest negative for metastatic disease. CT Abdomen/Pelvis: Stable hepatic lesions; Question new mural thickening in the cecum. Bone Scan: New Let hip lesion suspicious for bone metastasis.     Increased CEA; new mural thickening in the cecum and new left hip lesion suspicious for bone metastasis are consistent with disease progression; He derived maximum benefit from FOLFOX/AVASTIN. D/C FOLFOX/AVASTIN. We recommended FOLFIRI second line therapy. Cycle # 1 FOLFIRI was on 08/02/2016. CEA 40.8 on 08/02/2016.  Xgeva q4 weeks started on 08/02/2016. Cycle # 2 FOLFIRI was on 08/16/2016. CEA 31.7 on 08/16/2016. Cycle # 3 FOLFIRI (added Avastin) was on 08/30/2016 given that ulcers healed on EGD 08/15/2016 by Dr. Austin Vega; Protonix bid since avastin re-started. Colonoscopy on 08/29/2016 (to look at the cecal area) unremarkable. CEA 26.7 on 08/30/2016. Cycle # 4 FOLFIRI/Avastin was on 09/13/2016. CEA 23.4 on 09/13/2016. Cycle # 5 FOLFIRI/Avastin was on 09/27/2016. CEA 22.3 on 09/27/2016. Cycle # 6 FOLFIRI/Avastin was on 10/11/2016. CEA 18.4 on 10/11/2016.      Bone scan 10/21/2016 stable bone metastasis; ? New lesion anterior left sixth rib likely interval healing fracture. CT abdomen/pelvis revealed stable hepatic metastasis. CT chest negative for metastatic disease. Continue FOLFIRI/Avastin and repeat scans in 3 months. Cycle # 7 FOLFIRI/Avastin was on 10/25/2016. CEA 16.1 on 10/25/2016. Cycle # 8 FOLFIRI/Avastin was on 11/08/2016. CEA 15.7 on 11/08/2016. Cycle # 9 FOLFIRI/Avastin was on 11/29/2016. CEA 13.6 on 11/29/2016. Cycle # 10 FOLFIRI/Avastin was on 12/13/2016. CEA 10.6 on 12/13/2016. Cycle # 11 FOLFIRI/Avastin was on 12/27/2016. CEA 11.1 on 12/27/2016. Cycle # 12 FOLFIRI/Avastin was on 01/10/2017. CEA 9.7 on 01/10/2017. CT chest 01/23/2017 No new pulmonary nodule or lymphadenopathy. Patchy groundglass opacities within the left lower lobe, suggestive of infectious or inflammatory etiology. Followup CT chest in 3 months. Slight increased linear sclerosis along the left anterior sixth rib corresponding to an area of increased uptake on the prior bone scan from 10/21/2016, favored to be related to interval healing changes of a nondisplaced fracture. CT abdomen/pelvis on 01/23/2017 Redemonstration of multiple hepatic metastases, which are similar compared to the prior CT from 10/21/2016. Redemonstration of area of increased sclerosis along the right acetabulum suspicious for metastatic disease.    Bone scan on 01/23/2017 Stable subtle uptake within the left proximal diaphysis, again suggestive of metastatic disease  Decreased subtle uptake within the medial right acetabulum. Decreased uptake within the left anterior sixth rib corresponding to linear sclerosis on the recent CT, favored to be related to an joint rib fracture. Continue FOLFIRI + Avastin and repeat scans in 3 months. Cycle # 13 FOLFIRI + Avastin was on 01/24/2017. Cycle # 14 FOLFIRI + Avastin was on 02/07/2017. Cycle # 15 FOLFIRI + Avastin was on 02/21/2017. Cycle # 16 FOLFIRI + Avastin was on 03/07/2017. Cycle # 17 FOLFIRI + Avastin was on 03/21/2017. Cycle # 18 FOLFIRI + Avastin was on 04/04/2017. CT chest 04/17/2017 negative for metastatic disease. CT abdomen/pelvis 04/17/2017 Stable hypodense metastatic lesions within the liver. Stable area of increased sclerosis along the right acetabulum  Bone scan 04/17/2017 Stable subtle uptake within the left proximal femoral diaphysis; No definite abnormal uptake in the region of a sclerotic lesion along the posterior column of the right acetabulum noted on CT. Stable slight uptake within the left anterior sixth rib corresponding to linear sclerosis on the recent CT, favored to be related to a fracture. Continue FOLFIRI + Avastin and repeat scans in 3 months. Cycle # 19 FOLFIRI + Avastin was on 04/18/2017. CEA 7.5 on 04/18/2017. Cycle # 20 FOLFIRI + Avastin was on 05/02/2017. CEA 7.7 on 05/02/2017. Cycle # 21 FOLFIRI + Avastin was on 05/16/2017. CEA 7.1 on 05/16/2017. Cycle # 22 FOLFIRI + Avastin was on 05/30/2017. CEA 8.2 on 05/30/2017. Cycle # 23 FOLFIRI + Avastin was on 06/13/2017. CEA 7.7 on 06/13/2017. Cycle # 24 FOLFIRI + Avastin was on 06/27/2017. CEA 6.6 on 06/27/2017.    Re-staging scans 07/05/2017:  Bone scan stable proximal left femoral diaphysis, right acetabulum, and left anterior sixth rib lesions;  CT abdomen/pelvis stable hypodense metastatic lesions within the liver; CT chest without convincing evidence of metastatic disease. Cycle # 25 FOLFIRI + Avastin was on 07/11/2017. CEA 6.3 on 07/11/2017. Cycle # 26 FOLFIRI + Avastin was on 07/25/2017. CEA 7.3 on 07/25/2017. Cycle # 27 FOLFIRI + Avastin was on 08/08/2017. CEA 6.5 on 08/08/2017. Cycle # 28 FOLFIRI + Avastin was on 08/22/2017. CEA 5.9 on 08/22/2017. Cycle # 29 FOLFIRI + Avastin was on 09/05/2017. CEA 6.3 on 09/05/2017. Cycle # 30 FOLFIRI + Avastin was on 09/19/2017. CEA 6.3 on 09/19/2017. Bone scan 09/26/2017 stable. CT abdomen/pelvis on 09/26/2017 Stable hypodense mass in the liver. Stable sclerotic lesion in the acetabulum. CT chest 09/26/2017 negative for metastatic disease. Cycle # 31 FOLFIRI + Avastin was on 10/03/2017. CEA 7.4 on 10/03/2017. Cycle # 32 FOLFIRI + Avastin was on 10/17/2017. CEA 6.0 on 10/17/2017. Cycle # 33 FOLFIRI + Avastin was on 10/31/2017. CEA 6.8 on 10/31/2017. Cycle # 34 FOLFIRI + Avastin was on 11/14/2017. CEA 7.2 on 11/14/2017. Cycle # 35 FOLFIRI + Avastin was on 11/28/2017. CEA 5.1 on 11/28/2017. Cycle # 36 FOLFIRI + Avastin was on 12/12/2017. CEA 6.1 on 12/12/2017. Bone scan 12/22/2017 noted no evidence of metastatic disease to the axial or appendicular skeleton. CT chest 12/22/2017 noted no evidence of metastatic disease. CT abdomen/pelvis 12/22/2017 noted stable hypodense lesions in the liver. Continue FOLFIRI + Avastin and repeat scans in 3 months. Cycle # 37 FOLFIRI + Avastin was on 12/27/2018. CEA 6.7 on 12/27/2017. Cycle # 38 FOLFIRI + Avastin was on 01/09/2018. CEA 5.0 on 01/09/2018. Cycle # 39 FOLFIRI + Avastin was on 01/23/2018. CEA 6.5 on 01/23/2018. Cycle # 40 FOLFIRI + Avastin was on 02/06/2018. CEA 6.9 on 02/06/2018. Cycle # 41 FOLFIRI + Avastin was on 02/20/2018. CEA 6.4 on 02/20/2018. Cycle # 42 FOLFIRI + Avastin was on 03/06/2018. CEA 6.6 on 03/06/2018. Cycle # 43 FOLFIRI + Avastin was on 03/20/2018. CEA 5.7 on 03/20/2018.   Bone scan on 03/26/2018 negative for metastatic disease. CT chest 03/26/2018 noted ? new 7 mm nodule in LCUY. CT abdomen/pelvis 03/26/2018 noted stable hypodense lesions in the liver. ? New small ascites in the abdomen. Patient wants to continue to monitor the lung finding and repeat scans in 2 months instead of changing the current chemotherapy regimen to oral Lonsurf. Cycle # 44 FOLFIRI + Avastin was on 04/03/2018. CEA 6.3 on 04/03/2018. Cycle # 45 FOLFIRI + Avastin was on 04/24/2018. CEA 5.3 on 04/24/2018. Cycle # 46 FOLFIRI + Avastin was on 05/08/2018. CEA 5.4 on 05/08/2018. Cycle # 47 FOLFIRI + Avastin was on 05/22/2018. CEA 6.1 on 05/22/2018. CT chest 05/31/2018 noted stable nodule in LUCY. No evidence of worsening malignancy. CT abdomen/pelvis on 05/31/2018 noted stable liver metastasis. No evidence of worsening malignancy. Continue FOLFIRI + Avastin and repeat scans in 3 months. Cycle # 48 FOLFIRI + Avastin was on 06/06/2018. CEA 6.3 on 06/05/2018. Cycle # 49 FOLFIRI + Avastin was on 06/19/2018. CEA 6.1 on 06/19/2018. Cycle # 50 FOLFIRI + Avastin was on 07/03/2018. CEA 7.4 on 07/03/2018. Cycle # 51 FOLFIRI + Avastin was on 07/24/2018. CEA 6.7 on 07/24/2018. Cycle # 52 FOLFIRI + Avastin was on 08/14/2018. CEA 6.8 on 08/14/2018. Cycle # 53 FOLFIRI + Avastin was on 08/28/2018. CEA 6.5 on 08/27/2018. CT chest 09/06/2018 noted interval decrease in size of LUCY measuring up to 4 mm, suggestive of treatment response. No mediastinal or hilar LN  CT abdomen/pelvis 09/06/2018 Slight interval increase in size of a previously noted metastatic lesion within the right hepatic lobe. This may be related to differences in phase of enhancement compared to the most recent study from 5/31/2018, the lesion remains decreased in size compared to the more previous studies. No abdominal, retroperitoneal, or pelvic lymphadenopathy. Continue FOLFIRI + Avastin and repeat scans in 2 months.    Cycle # 54 FOLFIRI + Avastin was on 09/11/2018. CEA 6.4 on 09/10/2018. Cycle # 55 FOLFIRI + Avastin was on 09/25/2018. CEA 6.4 on 09/25/2018. Cycle # 56 FOLFIRI + Avastin was on 10/09/2018. CEA 6.7 on 10/08/2018. Cycle # 57 FOLFIRI + Avastin was on 10/23/2018. CEA 7.2 on 10/22/2018. CT chest 11/02/2018 stable 3 mm nodule within LUCY. No new right and left lung nodule. No mediastinal or osseous lesion. CT abdomen/pelvis 11/02/2018 stable metastatic disease to liver. No new metastatic disease identified. No mesenteric or retroperitoneal LN. No gross colonic lesion identified. Continue FOLFIRI + Avastin and repeat scans in 3 months. Cycle # 58 FOLFIRI + Avastin was on 11/06/2018. CEA 7.6 on 11/05/2018. Cycle # 59 FOLFIRI + Avastin was on 11/27/2018. CEA 6.9 on 11/26/2018. Cycle # 60 FOLFIRI + Avastin was on 12/11/2018. CEA 6.7 on 12/11/2018. Cycle # 61 FOLFIRI + Avastin was on 01/08/2019. CEA 5.6 on 01/07/2019. Cycle # 62 FOLFIRI + Avastin was on 01/29/2019. CEA 4.6 on 01/28/2019. Cycle # 63 FOLFIRI + Avastin was on 02/12/2019. CEA 4.3 on 02/11/2019. CT chest 02/21/2019 no evidence of metastatic disease. CT abdomen/pelvis 02/21/2019 stable hypodense liver lesions. No evidence of worsening metastatic disease. 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 Ordered MRI thoracic spine and referred to neurosurgery team.  Cycle # 64 FOLFIRI + Avastin was on 02/26/2019. CEA 4.7 on 02/25/2019. Cycle # 65 FOLFIRI + Avastin was on 03/12/2019. CEA 4.0 on 03/11/2019. Cycle # 66 FOLFIRI + Avastin was on 03/26/2019. CEA 4.7 on 03/25/2019. Cycle # 67 FOLFIRI + Avastin was on 04/09/2019. CEA 5.6 on 04/08/2019. Cycle # 68 FOLFIRI + Avastin was on 04/30/2019. CEA 4.9 on 04/29/2019. Cycle # 69 FOLFIRI + Avastin is today 05/14/2019. CEA 5.3 on 05/14/2019. RTC 2 weeks with prior scans and for Cycle # 70 FOLFIRI + Avastin.    MSI testing noted no mismatch repair protein loss of expression    5/14/2019  Kerry Dowd E Jono Mcleod MD  Board Certified Medical Oncologist

## 2019-05-14 NOTE — PROGRESS NOTES
Candice Hughes, Lead RN notified of platelet count of 79. Per kacy Mirza to proceed with chemotherapy today.

## 2019-05-15 ENCOUNTER — TELEPHONE (OUTPATIENT)
Dept: ONCOLOGY | Age: 69
End: 2019-05-15

## 2019-05-16 ENCOUNTER — HOSPITAL ENCOUNTER (OUTPATIENT)
Dept: INFUSION THERAPY | Age: 69
Discharge: HOME | End: 2019-05-16
Payer: MEDICARE

## 2019-05-16 DIAGNOSIS — C20 RECTAL CANCER METASTASIZED TO LIVER (HCC): Primary | ICD-10-CM

## 2019-05-16 DIAGNOSIS — C78.7 RECTAL CANCER METASTASIZED TO LIVER (HCC): Primary | ICD-10-CM

## 2019-05-16 PROCEDURE — 6360000002 HC RX W HCPCS: Performed by: INTERNAL MEDICINE

## 2019-05-16 PROCEDURE — 96372 THER/PROPH/DIAG INJ SC/IM: CPT

## 2019-05-16 RX ADMIN — PEGFILGRASTIM 6 MG: 6 INJECTION SUBCUTANEOUS at 14:35

## 2019-05-23 ENCOUNTER — HOSPITAL ENCOUNTER (OUTPATIENT)
Dept: CT IMAGING | Age: 69
Discharge: HOME | End: 2019-05-23
Payer: MEDICARE

## 2019-05-23 DIAGNOSIS — C20 RECTAL CANCER (HCC): ICD-10-CM

## 2019-05-23 PROCEDURE — 71260 CT THORAX DX C+: CPT

## 2019-05-23 PROCEDURE — 6360000004 HC RX CONTRAST MEDICATION: Performed by: RADIOLOGY

## 2019-05-23 PROCEDURE — 74177 CT ABD & PELVIS W/CONTRAST: CPT

## 2019-05-23 RX ADMIN — IOPAMIDOL 80 ML: 755 INJECTION, SOLUTION INTRAVENOUS at 13:16

## 2019-05-23 RX ADMIN — IOHEXOL 50 ML: 240 INJECTION, SOLUTION INTRATHECAL; INTRAVASCULAR; INTRAVENOUS; ORAL at 13:16

## 2019-05-24 ENCOUNTER — TELEPHONE (OUTPATIENT)
Dept: INFUSION THERAPY | Age: 69
End: 2019-05-24

## 2019-05-24 NOTE — TELEPHONE ENCOUNTER
[facility] called this morning and stated they can't get his 5-FU pump for the day of his treatment May 28th, due to the holiday. I asked Dr. Shaniqua Partida and we will reschedule his appointment for June 4th at 850 am. Patient notified by Foster Ellis and I notified [id number]  Sandra( pharmacist notified)

## 2019-05-28 ENCOUNTER — HOSPITAL ENCOUNTER (OUTPATIENT)
Dept: INFUSION THERAPY | Age: 69
End: 2019-05-28
Payer: MEDICARE

## 2019-06-03 ENCOUNTER — HOSPITAL ENCOUNTER (OUTPATIENT)
Age: 69
Discharge: HOME | End: 2019-06-03
Payer: MEDICARE

## 2019-06-03 LAB
ALBUMIN SERPL-MCNC: 3.5 G/DL (ref 3.5–5.2)
ALP BLD-CCNC: 176 U/L (ref 40–129)
ALT SERPL-CCNC: 17 U/L (ref 0–40)
ANION GAP SERPL CALCULATED.3IONS-SCNC: 9 MMOL/L (ref 7–16)
ANISOCYTOSIS: ABNORMAL
AST SERPL-CCNC: 29 U/L (ref 0–39)
BASOPHILS ABSOLUTE: 0.02 E9/L (ref 0–0.2)
BASOPHILS RELATIVE PERCENT: 0.6 % (ref 0–2)
BILIRUB SERPL-MCNC: 0.6 MG/DL (ref 0–1.2)
BUN BLDV-MCNC: 20 MG/DL (ref 8–23)
CALCIUM SERPL-MCNC: 9.4 MG/DL (ref 8.6–10.2)
CEA: 4.8 NG/ML (ref 0–5.2)
CHLORIDE BLD-SCNC: 105 MMOL/L (ref 98–107)
CO2: 30 MMOL/L (ref 22–29)
CREAT SERPL-MCNC: 1 MG/DL (ref 0.7–1.2)
EOSINOPHILS ABSOLUTE: 0.09 E9/L (ref 0.05–0.5)
EOSINOPHILS RELATIVE PERCENT: 2.5 % (ref 0–6)
GFR AFRICAN AMERICAN: >60
GFR NON-AFRICAN AMERICAN: >60 ML/MIN/1.73
GLUCOSE BLD-MCNC: 123 MG/DL (ref 74–99)
HCT VFR BLD CALC: 28.9 % (ref 37–54)
HEMOGLOBIN: 9.2 G/DL (ref 12.5–16.5)
IMMATURE GRANULOCYTES #: 0.03 E9/L
IMMATURE GRANULOCYTES %: 0.8 % (ref 0–5)
LYMPHOCYTES ABSOLUTE: 0.49 E9/L (ref 1.5–4)
LYMPHOCYTES RELATIVE PERCENT: 13.7 % (ref 20–42)
MCH RBC QN AUTO: 33.2 PG (ref 26–35)
MCHC RBC AUTO-ENTMCNC: 31.8 % (ref 32–34.5)
MCV RBC AUTO: 104.3 FL (ref 80–99.9)
MONOCYTES ABSOLUTE: 0.32 E9/L (ref 0.1–0.95)
MONOCYTES RELATIVE PERCENT: 9 % (ref 2–12)
NEUTROPHILS ABSOLUTE: 2.62 E9/L (ref 1.8–7.3)
NEUTROPHILS RELATIVE PERCENT: 73.4 % (ref 43–80)
OVALOCYTES: ABNORMAL
PDW BLD-RTO: 17.4 FL (ref 11.5–15)
PLATELET # BLD: 107 E9/L (ref 130–450)
PMV BLD AUTO: 11 FL (ref 7–12)
POIKILOCYTES: ABNORMAL
POLYCHROMASIA: ABNORMAL
POTASSIUM SERPL-SCNC: 4.3 MMOL/L (ref 3.5–5)
RBC # BLD: 2.77 E12/L (ref 3.8–5.8)
SODIUM BLD-SCNC: 144 MMOL/L (ref 132–146)
TEAR DROP CELLS: ABNORMAL
TOTAL PROTEIN: 6 G/DL (ref 6.4–8.3)
WBC # BLD: 3.6 E9/L (ref 4.5–11.5)

## 2019-06-03 PROCEDURE — 85025 COMPLETE CBC W/AUTO DIFF WBC: CPT

## 2019-06-03 PROCEDURE — 82378 CARCINOEMBRYONIC ANTIGEN: CPT

## 2019-06-03 PROCEDURE — 80053 COMPREHEN METABOLIC PANEL: CPT

## 2019-06-03 PROCEDURE — 36415 COLL VENOUS BLD VENIPUNCTURE: CPT

## 2019-06-04 ENCOUNTER — HOSPITAL ENCOUNTER (OUTPATIENT)
Dept: INFUSION THERAPY | Age: 69
Discharge: HOME | End: 2019-06-04
Payer: MEDICARE

## 2019-06-04 ENCOUNTER — OFFICE VISIT (OUTPATIENT)
Dept: ONCOLOGY | Age: 69
End: 2019-06-04
Payer: MEDICARE

## 2019-06-04 VITALS
BODY MASS INDEX: 25.94 KG/M2 | DIASTOLIC BLOOD PRESSURE: 69 MMHG | HEART RATE: 64 BPM | RESPIRATION RATE: 20 BRPM | TEMPERATURE: 98.1 F | SYSTOLIC BLOOD PRESSURE: 122 MMHG | WEIGHT: 195.7 LBS | HEIGHT: 73 IN

## 2019-06-04 VITALS
TEMPERATURE: 98.6 F | DIASTOLIC BLOOD PRESSURE: 63 MMHG | SYSTOLIC BLOOD PRESSURE: 122 MMHG | HEART RATE: 56 BPM | RESPIRATION RATE: 18 BRPM

## 2019-06-04 DIAGNOSIS — Z09 FOLLOW UP: Primary | ICD-10-CM

## 2019-06-04 DIAGNOSIS — C78.7 RECTAL CANCER METASTASIZED TO LIVER (HCC): ICD-10-CM

## 2019-06-04 DIAGNOSIS — C79.51 BONE METASTASIS (HCC): ICD-10-CM

## 2019-06-04 DIAGNOSIS — C78.7 RECTAL CANCER METASTASIZED TO LIVER (HCC): Primary | ICD-10-CM

## 2019-06-04 DIAGNOSIS — C20 RECTAL CANCER METASTASIZED TO LIVER (HCC): Primary | ICD-10-CM

## 2019-06-04 DIAGNOSIS — C20 RECTAL CANCER METASTASIZED TO LIVER (HCC): ICD-10-CM

## 2019-06-04 PROCEDURE — 96413 CHEMO IV INFUSION 1 HR: CPT

## 2019-06-04 PROCEDURE — 96417 CHEMO IV INFUS EACH ADDL SEQ: CPT

## 2019-06-04 PROCEDURE — 2580000003 HC RX 258: Performed by: INTERNAL MEDICINE

## 2019-06-04 PROCEDURE — 96411 CHEMO IV PUSH ADDL DRUG: CPT

## 2019-06-04 PROCEDURE — 96368 THER/DIAG CONCURRENT INF: CPT

## 2019-06-04 PROCEDURE — 96415 CHEMO IV INFUSION ADDL HR: CPT

## 2019-06-04 PROCEDURE — 6360000002 HC RX W HCPCS: Performed by: INTERNAL MEDICINE

## 2019-06-04 PROCEDURE — 96372 THER/PROPH/DIAG INJ SC/IM: CPT

## 2019-06-04 PROCEDURE — 96375 TX/PRO/DX INJ NEW DRUG ADDON: CPT

## 2019-06-04 PROCEDURE — 99214 OFFICE O/P EST MOD 30 MIN: CPT | Performed by: INTERNAL MEDICINE

## 2019-06-04 RX ORDER — HEPARIN SODIUM (PORCINE) LOCK FLUSH IV SOLN 100 UNIT/ML 100 UNIT/ML
500 SOLUTION INTRAVENOUS PRN
Status: DISCONTINUED | OUTPATIENT
Start: 2019-06-04 | End: 2019-06-05 | Stop reason: HOSPADM

## 2019-06-04 RX ORDER — DEXAMETHASONE SODIUM PHOSPHATE 10 MG/ML
10 INJECTION INTRAMUSCULAR; INTRAVENOUS ONCE
Status: COMPLETED | OUTPATIENT
Start: 2019-06-04 | End: 2019-06-04

## 2019-06-04 RX ORDER — SODIUM CHLORIDE 0.9 % (FLUSH) 0.9 %
10 SYRINGE (ML) INJECTION PRN
Status: CANCELLED | OUTPATIENT
Start: 2019-06-04

## 2019-06-04 RX ORDER — FLUOROURACIL 50 MG/ML
850 INJECTION, SOLUTION INTRAVENOUS ONCE
Status: CANCELLED | OUTPATIENT
Start: 2019-06-04

## 2019-06-04 RX ORDER — DEXAMETHASONE SODIUM PHOSPHATE 10 MG/ML
10 INJECTION, SOLUTION INTRAMUSCULAR; INTRAVENOUS ONCE
Status: CANCELLED | OUTPATIENT
Start: 2019-06-04

## 2019-06-04 RX ORDER — HEPARIN SODIUM (PORCINE) LOCK FLUSH IV SOLN 100 UNIT/ML 100 UNIT/ML
500 SOLUTION INTRAVENOUS PRN
Status: CANCELLED | OUTPATIENT
Start: 2019-06-04

## 2019-06-04 RX ORDER — SODIUM CHLORIDE 9 MG/ML
250 INJECTION, SOLUTION INTRAVENOUS CONTINUOUS
Status: DISCONTINUED | OUTPATIENT
Start: 2019-06-04 | End: 2019-06-05 | Stop reason: HOSPADM

## 2019-06-04 RX ORDER — DIPHENHYDRAMINE HYDROCHLORIDE 50 MG/ML
50 INJECTION INTRAMUSCULAR; INTRAVENOUS ONCE
Status: CANCELLED | OUTPATIENT
Start: 2019-06-04

## 2019-06-04 RX ORDER — ATROPINE SULFATE 0.4 MG/ML
0.4 AMPUL (ML) INJECTION ONCE
Status: COMPLETED | OUTPATIENT
Start: 2019-06-04 | End: 2019-06-04

## 2019-06-04 RX ORDER — METHYLPREDNISOLONE SODIUM SUCCINATE 125 MG/2ML
125 INJECTION, POWDER, LYOPHILIZED, FOR SOLUTION INTRAMUSCULAR; INTRAVENOUS ONCE
Status: CANCELLED | OUTPATIENT
Start: 2019-06-04

## 2019-06-04 RX ORDER — PALONOSETRON HYDROCHLORIDE 0.05 MG/ML
0.25 INJECTION, SOLUTION INTRAVENOUS ONCE
Status: CANCELLED | OUTPATIENT
Start: 2019-06-04

## 2019-06-04 RX ORDER — SODIUM CHLORIDE 9 MG/ML
INJECTION, SOLUTION INTRAVENOUS CONTINUOUS
Status: CANCELLED | OUTPATIENT
Start: 2019-06-04

## 2019-06-04 RX ORDER — SODIUM CHLORIDE 9 MG/ML
250 INJECTION, SOLUTION INTRAVENOUS CONTINUOUS
Status: CANCELLED | OUTPATIENT
Start: 2019-06-04

## 2019-06-04 RX ORDER — ATROPINE SULFATE 0.4 MG/ML
0.4 AMPUL (ML) INJECTION ONCE
Status: CANCELLED | OUTPATIENT
Start: 2019-06-04

## 2019-06-04 RX ORDER — EPINEPHRINE 1 MG/ML
0.3 INJECTION, SOLUTION, CONCENTRATE INTRAVENOUS PRN
Status: CANCELLED | OUTPATIENT
Start: 2019-06-04

## 2019-06-04 RX ORDER — PALONOSETRON HYDROCHLORIDE 0.05 MG/ML
0.25 INJECTION, SOLUTION INTRAVENOUS ONCE
Status: COMPLETED | OUTPATIENT
Start: 2019-06-04 | End: 2019-06-04

## 2019-06-04 RX ORDER — 0.9 % SODIUM CHLORIDE 0.9 %
10 VIAL (ML) INJECTION ONCE
Status: CANCELLED | OUTPATIENT
Start: 2019-06-04

## 2019-06-04 RX ORDER — SODIUM CHLORIDE 0.9 % (FLUSH) 0.9 %
10 SYRINGE (ML) INJECTION PRN
Status: DISCONTINUED | OUTPATIENT
Start: 2019-06-04 | End: 2019-06-05 | Stop reason: HOSPADM

## 2019-06-04 RX ORDER — FLUOROURACIL 50 MG/ML
850 INJECTION, SOLUTION INTRAVENOUS ONCE
Status: COMPLETED | OUTPATIENT
Start: 2019-06-04 | End: 2019-06-04

## 2019-06-04 RX ADMIN — SODIUM CHLORIDE 400 MG: 9 INJECTION, SOLUTION INTRAVENOUS at 10:22

## 2019-06-04 RX ADMIN — SODIUM CHLORIDE 250 ML: 9 INJECTION, SOLUTION INTRAVENOUS at 08:54

## 2019-06-04 RX ADMIN — BEVACIZUMAB 400 MG: 400 INJECTION, SOLUTION INTRAVENOUS at 09:18

## 2019-06-04 RX ADMIN — DENOSUMAB 120 MG: 120 INJECTION SUBCUTANEOUS at 10:23

## 2019-06-04 RX ADMIN — Medication 500 UNITS: at 12:08

## 2019-06-04 RX ADMIN — Medication 10 ML: at 12:08

## 2019-06-04 RX ADMIN — ATROPINE SULFATE 0.4 MG: 0.4 INJECTION, SOLUTION INTRAMUSCULAR; INTRAVENOUS; SUBCUTANEOUS at 08:57

## 2019-06-04 RX ADMIN — FLUOROURACIL 850 MG: 50 INJECTION, SOLUTION INTRAVENOUS at 12:05

## 2019-06-04 RX ADMIN — PALONOSETRON 0.25 MG: 0.25 INJECTION, SOLUTION INTRAVENOUS at 08:55

## 2019-06-04 RX ADMIN — LEUCOVORIN CALCIUM 850 MG: 10 INJECTION INTRAMUSCULAR; INTRAVENOUS at 10:18

## 2019-06-04 RX ADMIN — DEXAMETHASONE SODIUM PHOSPHATE 10 MG: 10 INJECTION INTRAMUSCULAR; INTRAVENOUS at 08:58

## 2019-06-04 NOTE — PROGRESS NOTES
3488. Bone scan on 11/10/2015 noted no metastatic disease. CT chest on 11/10/2015 revealed Multiple pulmonary nodules in the upper and lower lobes consistent with metastatic disease;   Hepatic metastasis also visualized. 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 Mediport was placed. Cycle # 1 of FOLFOX + Avastin was on 11/23/2015. CEA was 4555 on 11/23/2015. Cycle # 2 of FOLFOX + Avastin was on 12/08/2015. CEA was 3160 on 12/08/2015. Cycle # 3 of FOLFOX + Avastin was on 12/22/2015. CEA was 2516 on 12/21/2015. Cycle # 4 of FOLFOX + Avastin was on 01/05/2016. CEA was 2098 on 01/05/2015. Cycle # 5 of FOLFOX + Avastin was on 01/19/2016. CEA was 1511 on 01/05/2015.     -Bone scan on 01/26/2016 noted no metastatic disease. CT chest on 01/26/2016 revealed Significant response to treatment with no visible residual nodules. CT scan abdomen/pelvis on 01/26/2016 noted Interval decreased size of multiple masses in the liver compatible with treatment response. Continue another 2 months of FOLFOX + Avastin and repeat scans. Cycle # 6 of FOLFOX + Avastin was on 02/02/2016.  on 02/02/2016. Cycle # 7 of FOLFOX + Avastin was on 02/16/2016.  on 02/16/2016. Cycle # 8 of FOLFOX + Avastin was on 03/01/2016.  on 03/01/2016. Cycle # 9 of FOLFOX + Avastin was on 03/15/2016.  on 03/15/2016. Cycle # 10 of FOLFOX + Avastin was on 03/29/2016. .4 on 03/29/2016. Cycle # 11 of FOLFOX + Avastin was on 04/12/2016. .4 on 04/12/2016.     Re-staging scans on 04/19/2016: CT Chest: clear lungs; no evidence of recurring pulmonary nodule; CT Abdomen/Pelvis: Further interval decrease in size of the multiple metastatic hepatic lesions, the largest lesion now measures 3.9 x 3.5 cm and previously measured 4.5 cm in maximum diameter. No mesenteric lymphadenopathy is identified; Bone Scan: No evidence of osseous metastasis.   Continue same regimen and re-stage in 2-3 months. Cycle # 12 FOLFOX + Avastin was on 04/26/2016. .5 on 04/26/2016. Cycle # 13 FOLFOX + Avastin was on 05/10/2016. .3 on 05/10/2016. Cycle # 14 FOLFOX+AVASTIN was on 05/24/2016. .5 on 05/24/2016. Cycle # 15 FOLFOX + Avastin was on 06/07/2016. CEA 90.5 on 06/07/2016. Admitted to [facility] 06/13/2016-06/16/2016 for abdominal pain: EGD noted 1.5 cm clean based duodenal bulb ulceration s/p epinephrine and bicap per Dr. Jade Almodovar. No active bleeding. A. Stomach, biopsy: Mild chronic gastritis, immunostain negative for Helicobacter  B. Esophagus, biopsy: Gastric glandular mucosa with prominent intestinal metaplasia ([place]'s epithelium), negative for epithelial dysplasia, esophageal squamous mucosa not identified  Cycle # 16 FOLFOX (discontinued avastin (bevacizumab) given association of peptic ulcer disease and known association of GI perforation) was on 06/21/2016. Cycle # 17 FOLFOX was on 07/05/2016. CEA 52.6 on 07/19/2016. Cycle # 17 FOLFOX was on 07/05/2016. CEA 52.6 on 07/05/2016. CEA 47.6 on 07/19/2016.     Re-staging scans 07/19/2106: CT Chest negative for metastatic disease. CT Abdomen/Pelvis: Stable hepatic lesions; Question new mural thickening in the cecum. Bone Scan: New Let hip lesion suspicious for bone metastasis.     Increased CEA; new mural thickening in the cecum and new left hip lesion suspicious for bone metastasis are consistent with disease progression; He derived maximum benefit from FOLFOX/AVASTIN. D/C FOLFOX/AVASTIN. We recommended FOLFIRI second line therapy. Cycle # 1 FOLFIRI was on 08/02/2016. CEA 40.8 on 08/02/2016. Xgeva q4 weeks started on 08/02/2016. Cycle # 2 FOLFIRI was on 08/16/2016. CEA 31.7 on 08/16/2016. Cycle # 3 FOLFIRI (added Avastin) was on 08/30/2016 given that ulcers healed on EGD 08/15/2016 by Dr. Brianna Abreu; Protonix bid since avastin re-started. Colonoscopy on 08/29/2016 (to look at the cecal area) unremarkable.  CEA 26.7 and repeat scans in 3 months. Cycle # 13 FOLFIRI + Avastin was on 01/24/2017. Cycle # 14 FOLFIRI + Avastin was on 02/07/2017. Cycle # 15 FOLFIRI + Avastin was on 02/21/2017. Cycle # 16 FOLFIRI + Avastin was on 03/07/2017. Cycle # 17 FOLFIRI + Avastin was on 03/21/2017. Cycle # 18 FOLFIRI + Avastin was on 04/04/2017. CT chest 04/17/2017 negative for metastatic disease. CT abdomen/pelvis 04/17/2017 Stable hypodense metastatic lesions within the liver. Stable area of increased sclerosis along the right acetabulum  Bone scan 04/17/2017 Stable subtle uptake within the left proximal femoral diaphysis; No definite abnormal uptake in the region of a sclerotic lesion along the posterior column of the right acetabulum noted on CT. Stable slight uptake within the left anterior sixth rib corresponding to linear sclerosis on the recent CT, favored to be related to a fracture. Continue FOLFIRI + Avastin and repeat scans in 3 months. Cycle # 19 FOLFIRI + Avastin was on 04/18/2017. Cycle # 20 FOLFIRI + Avastin was on 05/02/2017. Cycle # 21 FOLFIRI + Avastin was on 05/16/2017. Cycle # 22 FOLFIRI + Avastin was on 05/30/2017. Cycle # 23 FOLFIRI + Avastin was on 06/13/2017. Cycle # 24 FOLFIRI + Avastin was on 06/27/2017. Cycle # 25 FOLFIRI + Avastin was on 07/11/2017. Cycle # 26 FOLFIRI + Avastin was on 07/25/2017. Cycle # 27 FOLFIRI + Avastin was on 08/08/2017. Cycle # 28 FOLFIRI + Avastin was on 08/22/2017. Cycle # 29 FOLFIRI + Avastin was on 09/05/2017. Cycle # 30 FOLFIRI + Avastin was on 09/19/2017. Bone scan 09/26/2017 stable. CT abdomen/pelvis on 09/26/2017 Stable hypodense mass in the liver. Stable sclerotic lesion in the acetabulum. CT chest 09/26/2017 negative for metastatic disease. Cycle # 31 FOLFIRI + Avastin was on 10/03/2017. CEA 7.4 on 10/03/2017. Cycle # 32 FOLFIRI + Avastin was on 10/17/2017. CEA 6.0 on 10/17/2017. Cycle # 33 FOLFIRI + Avastin was on 10/31/2017.  CEA 6.8 on Avastin was on 07/24/2018. Cycle # 52 FOLFIRI + Avastin was on 08/14/2018. Cycle # 53 FOLFIRI + Avastin was on 08/28/2018. CT chest 09/06/2018 noted interval decrease in size of LUCY measuring up to 4 mm, suggestive of treatment response. No mediastinal or hilar LN  CT abdomen/pelvis 09/06/2018 Slight interval increase in size of a previously noted metastatic lesion within the right hepatic lobe. This may be related to differences in phase of enhancement compared to the most recent study from 5/31/2018, the lesion remains decreased in size compared to the more previous studies. No abdominal, retroperitoneal, or pelvic lymphadenopathy. Continue FOLFIRI + Avastin and repeat scans in 2 months. Cycle # 54 FOLFIRI + Avastin was on 09/11/2018. Cycle # 55 FOLFIRI + Avastin was on 09/25/2018. Cycle # 56 FOLFIRI + Avastin was on 10/09/2018. Cycle # 57 FOLFIRI + Avastin was on 10/23/2018. CT chest 11/02/2018 stable 3 mm nodule within LUCY. No new right and left lung nodule. No mediastinal or osseous lesion. CT abdomen/pelvis 11/02/2018 stable metastatic disease to liver. No new metastatic disease identified. No mesenteric or retroperitoneal LN. No gross colonic lesion identified. Continue FOLFIRI + Avastin and repeat scans in 3 months. Cycle # 58 FOLFIRI + Avastin was on 11/06/2018. CEA 7.6 on 11/05/2018. Cycle # 59 FOLFIRI + Avastin was on 11/27/2018. CEA 6.9 on 11/26/2018. Cycle # 60 FOLFIRI + Avastin was on 12/11/2018. CEA 6.7 on 12/11/2018. Cycle # 61 FOLFIRI + Avastin was on 01/08/2019. CEA 5.6 on 01/07/2019. Cycle # 62 FOLFIRI + Avastin was on 01/29/2019. CEA 4.6 on 01/28/2019. Cycle # 63 FOLFIRI + Avastin was on 02/12/2019. CEA 4.3 on 02/11/2019. CT chest 02/21/2019 no evidence of metastatic disease. CT abdomen/pelvis 02/21/2019 stable hypodense liver lesions. No evidence of worsening metastatic disease. 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  Ordered MRI thoracic spine and referred to neurosurgery team.    Cycle # 64 FOLFIRI + Avastin was on 02/26/2019. CEA 4.7 on 02/25/2019. Cycle # 65 FOLFIRI + Avastin was on 03/12/2019. CEA 4.0 on 03/11/2019. Cycle # 66 FOLFIRI + Avastin was on 03/26/2019. CEA 4.7 on 03/25/2019. Cycle # 67 FOLFIRI + Avastin was on 04/09/2019. CEA 5.6 on 04/08/2019. Cycle # 68 FOLFIRI + Avastin was on 04/30/2019. CEA 4.9 on 04/29/2019. Cycle # 69 FOLFIRI + Avastin was on 05/14/2019. CEA 5.3 on 05/14/2019. Review of Systems;  CONSTITUTIONAL: No fever, chills. Fair appetite and energy level. ENMT: Eyes: no diplopia; Nose: No epistaxis. Mouth: No oral lesions. RESPIRATORY: No hemoptysis, shortness of breath, cough. CARDIOVASCULAR: No chest pain, palpitations. GASTROINTESTINAL: No nausea/vomiting, abdominal pain. Intermittent diarrhea. GENITOURINARY: No dysuria, urinary frequency, hematuria. NEURO: No syncope, presyncope, headache. Remainder ROS: Negative. Past Medical History:   Past Medical History               Diagnosis Date    Arthritis      Cancer ([facility] [address]         colon    Depression      Hyperlipidemia      Hypertension           Medications:  Reviewed and reconciled.     Allergies: Allergies   Allergen Reactions    Neosporin [Neomycin-Polymyxin-Gramicidin] Rash    Tape [place] Tape] Rash      Physical Exam:  /69 (Site: Left Upper Arm, Position: Sitting, Cuff Size: Medium Adult)   Pulse 64   Temp 98.1 °F (36.7 °C) (Oral)   Resp 20   Ht 6' 1\" (1.854 m)   Wt 195 lb 11.2 oz (88.8 kg)   BMI 25.82 kg/m²   GENERAL: Alert, oriented x 3, not in acute distress. HEENT: PERRLA, EOMI. No oral lesions. NECK: Supple. Without lymphadenopathy. LUNGS: Lungs are CTA bilaterally, with no wheezing, crackles or rhonchi. CARDIOVASCULAR: Regular rhythm. No murmurs, rubs or gallops. ABDOMEN: Soft. Non-tender, non-distended. Positive bowel sounds. EXTREMITIES: Without clubbing, cyanosis, or edema. NEUROLOGIC: No focal deficits. 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 KRAS Mutation: Mutation detected. BRAF Mutation: Mutation not detected. 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 CT scan abdomen/pelvis on 10/26/2015 revealed small nodules at the lung bases, extensive liver lesions consistent with metastatic rectosigmoid cancer. CEA 3488 on 10/30/2015. Bone scan on 11/10/2015 noted no metastatic disease. CT chest on 11/10/2015 revealed Multiple pulmonary nodules in the upper and lower lobes consistent with metastatic disease; Hepatic metastasis also visualized. 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 Mediport was placed. Cycle # 1 of FOLFOX + Avastin was on 11/23/2015. CEA was 4555 on 11/23/2015. Cycle # 2 of FOLFOX + Avastin was on 12/08/2015. CEA was 3160 on 12/08/2015. Cycle # 3 of FOLFOX + Avastin was on 12/22/2015. CEA was 2516 on 12/21/2015. Cycle # 4 of FOLFOX + Avastin was on 01/05/2016. CEA was 2098 on 01/05/2015. Cycle # 5 of FOLFOX + Avastin was on 01/19/2016. CEA was 1511 on 01/05/2015.     -Bone scan on 01/26/2016 noted no metastatic disease. CT chest on 01/26/2016 revealed Significant response to treatment with no visible residual nodules. CT scan abdomen/pelvis on 01/26/2016 noted Interval decreased size of multiple masses in the liver compatible with treatment response. Continue another 2 months of FOLFOX + Avastin and repeat scans. Cycle # 6 of FOLFOX + Avastin was on 02/02/2016.  on 02/02/2016. Cycle # 7 of FOLFOX + Avastin was on 02/16/2016.  on 02/16/2016. Cycle # 8 of FOLFOX + Avastin was on 03/01/2016.  on 03/01/2016. Cycle # 9 of FOLFOX + Avastin was on 03/15/2016.  on 03/15/2016. Cycle # 10 of FOLFOX + Avastin was on 03/29/2016. .4 on 03/29/2016. Cycle # 11 of FOLFOX + Avastin was on 04/12/2016.  CEA 232.4 on 04/12/2016.     Re-staging scans on 04/19/2016: CT Chest: clear lungs; no evidence of recurring pulmonary nodule; CT Abdomen/Pelvis: Further interval decrease in size of the multiple metastatic hepatic lesions, the largest lesion now measures 3.9 x 3.5 cm and previously measured 4.5 cm in maximum diameter. No mesenteric lymphadenopathy is identified; Bone Scan: No evidence of osseous metastasis. Continue same regimen and re-stage in 2-3 months. Cycle # 12 FOLFOX + Avastin was on 04/26/2016. .5 on 04/26/2016. Cycle # 13 FOLFOX + Avastin was on 05/10/2016. .3 on 05/10/2016. Cycle # 14 FOLFOX+AVASTIN was on 05/24/2016. .5 on 05/24/2016. Cycle # 15 FOLFOX + Avastin was on 06/07/2016. CEA 90.5 on 06/07/2016. Admitted to [facility] 06/13/2016-06/16/2016 for abdominal pain: EGD noted 1.5 cm clean based duodenal bulb ulceration s/p epinephrine and bicap per Dr. Manjula Schroeder. No active bleeding. A. Stomach, biopsy: Mild chronic gastritis, immunostain negative for Helicobacter  B. Esophagus, biopsy: Gastric glandular mucosa with prominent ntestinal metaplasia ([place]'s epithelium), negative for epithelial dysplasia, esophageal squamous mucosa not identified     Cycle # 16 FOLFOX (discontinued avastin (bevacizumab) given association of peptic ulcer disease and known association of GI perforation.) was on 06/21/2016. CEA 47 on 06/21/2016. Cycle # 17 FOLFOX was on 07/05/2016. CEA 52.6 on 07/05/2016. CEA 47.6 on 07/19/2016.     Re-staging scans 07/19/2106: CT Chest negative for metastatic disease. CT Abdomen/Pelvis: Stable hepatic lesions; Question new mural thickening in the cecum. Bone Scan: New Let hip lesion suspicious for bone metastasis.     Increased CEA; new mural thickening in the cecum and new left hip lesion suspicious for bone metastasis are consistent with disease progression; He derived maximum benefit from FOLFOX/AVASTIN. D/C FOLFOX/AVASTIN. We recommended FOLFIRI second line therapy. Cycle # 1 FOLFIRI was on 08/02/2016. CEA 40.8 on 08/02/2016. Xgeva q4 weeks started on 08/02/2016. Cycle # 2 FOLFIRI was on 08/16/2016. CEA 31.7 on 08/16/2016. Cycle # 3 FOLFIRI (added Avastin) was on 08/30/2016 given that ulcers healed on EGD 08/15/2016 by Dr. Chelly Acevedo; Protonix bid since avastin re-started. Colonoscopy on 08/29/2016 (to look at the cecal area) unremarkable. CEA 26.7 on 08/30/2016. Cycle # 4 FOLFIRI/Avastin was on 09/13/2016. CEA 23.4 on 09/13/2016. Cycle # 5 FOLFIRI/Avastin was on 09/27/2016. CEA 22.3 on 09/27/2016. Cycle # 6 FOLFIRI/Avastin was on 10/11/2016. CEA 18.4 on 10/11/2016.      Bone scan 10/21/2016 stable bone metastasis; ? New lesion anterior left sixth rib likely interval healing fracture. CT abdomen/pelvis revealed stable hepatic metastasis. CT chest negative for metastatic disease. Continue FOLFIRI/Avastin and repeat scans in 3 months. Cycle # 7 FOLFIRI/Avastin was on 10/25/2016. CEA 16.1 on 10/25/2016. Cycle # 8 FOLFIRI/Avastin was on 11/08/2016. CEA 15.7 on 11/08/2016. Cycle # 9 FOLFIRI/Avastin was on 11/29/2016. CEA 13.6 on 11/29/2016. Cycle # 10 FOLFIRI/Avastin was on 12/13/2016. CEA 10.6 on 12/13/2016. Cycle # 11 FOLFIRI/Avastin was on 12/27/2016. CEA 11.1 on 12/27/2016. Cycle # 12 FOLFIRI/Avastin was on 01/10/2017. CEA 9.7 on 01/10/2017. CT chest 01/23/2017 No new pulmonary nodule or lymphadenopathy. Patchy groundglass opacities within the left lower lobe, suggestive of infectious or inflammatory etiology. Followup CT chest in 3 months. Slight increased linear sclerosis along the left anterior sixth rib corresponding to an area of increased uptake on the prior bone scan from 10/21/2016, favored to be related to interval healing changes of a nondisplaced fracture. CT abdomen/pelvis on 01/23/2017 Redemonstration of multiple hepatic metastases, which are similar compared to the prior CT from 10/21/2016.  Redemonstration of area of increased sclerosis along the right acetabulum suspicious for metastatic disease. Bone scan on 01/23/2017 Stable subtle uptake within the left proximal diaphysis, again suggestive of metastatic disease  Decreased subtle uptake within the medial right acetabulum. Decreased uptake within the left anterior sixth rib corresponding to linear sclerosis on the recent CT, favored to be related to an joint rib fracture. Continue FOLFIRI + Avastin and repeat scans in 3 months. Cycle # 13 FOLFIRI + Avastin was on 01/24/2017. Cycle # 14 FOLFIRI + Avastin was on 02/07/2017. Cycle # 15 FOLFIRI + Avastin was on 02/21/2017. Cycle # 16 FOLFIRI + Avastin was on 03/07/2017. Cycle # 17 FOLFIRI + Avastin was on 03/21/2017. Cycle # 18 FOLFIRI + Avastin was on 04/04/2017. CT chest 04/17/2017 negative for metastatic disease. CT abdomen/pelvis 04/17/2017 Stable hypodense metastatic lesions within the liver. Stable area of increased sclerosis along the right acetabulum  Bone scan 04/17/2017 Stable subtle uptake within the left proximal femoral diaphysis; No definite abnormal uptake in the region of a sclerotic lesion along the posterior column of the right acetabulum noted on CT. Stable slight uptake within the left anterior sixth rib corresponding to linear sclerosis on the recent CT, favored to be related to a fracture. Continue FOLFIRI + Avastin and repeat scans in 3 months. Cycle # 19 FOLFIRI + Avastin was on 04/18/2017. CEA 7.5 on 04/18/2017. Cycle # 20 FOLFIRI + Avastin was on 05/02/2017. CEA 7.7 on 05/02/2017. Cycle # 21 FOLFIRI + Avastin was on 05/16/2017. CEA 7.1 on 05/16/2017. Cycle # 22 FOLFIRI + Avastin was on 05/30/2017. CEA 8.2 on 05/30/2017. Cycle # 23 FOLFIRI + Avastin was on 06/13/2017. CEA 7.7 on 06/13/2017. Cycle # 24 FOLFIRI + Avastin was on 06/27/2017. CEA 6.6 on 06/27/2017.    Re-staging scans 07/05/2017:  Bone scan stable proximal left femoral diaphysis, right acetabulum, and left anterior sixth rib lesions;  CT abdomen/pelvis stable hypodense metastatic lesions within the liver; CT chest without convincing evidence of metastatic disease. Cycle # 25 FOLFIRI + Avastin was on 07/11/2017. CEA 6.3 on 07/11/2017. Cycle # 26 FOLFIRI + Avastin was on 07/25/2017. CEA 7.3 on 07/25/2017. Cycle # 27 FOLFIRI + Avastin was on 08/08/2017. CEA 6.5 on 08/08/2017. Cycle # 28 FOLFIRI + Avastin was on 08/22/2017. CEA 5.9 on 08/22/2017. Cycle # 29 FOLFIRI + Avastin was on 09/05/2017. CEA 6.3 on 09/05/2017. Cycle # 30 FOLFIRI + Avastin was on 09/19/2017. CEA 6.3 on 09/19/2017. Bone scan 09/26/2017 stable. CT abdomen/pelvis on 09/26/2017 Stable hypodense mass in the liver. Stable sclerotic lesion in the acetabulum. CT chest 09/26/2017 negative for metastatic disease. Cycle # 31 FOLFIRI + Avastin was on 10/03/2017. CEA 7.4 on 10/03/2017. Cycle # 32 FOLFIRI + Avastin was on 10/17/2017. CEA 6.0 on 10/17/2017. Cycle # 33 FOLFIRI + Avastin was on 10/31/2017. CEA 6.8 on 10/31/2017. Cycle # 34 FOLFIRI + Avastin was on 11/14/2017. CEA 7.2 on 11/14/2017. Cycle # 35 FOLFIRI + Avastin was on 11/28/2017. CEA 5.1 on 11/28/2017. Cycle # 36 FOLFIRI + Avastin was on 12/12/2017. CEA 6.1 on 12/12/2017. Bone scan 12/22/2017 noted no evidence of metastatic disease to the axial or appendicular skeleton. CT chest 12/22/2017 noted no evidence of metastatic disease. CT abdomen/pelvis 12/22/2017 noted stable hypodense lesions in the liver. Continue FOLFIRI + Avastin and repeat scans in 3 months. Cycle # 37 FOLFIRI + Avastin was on 12/27/2018. CEA 6.7 on 12/27/2017. Cycle # 38 FOLFIRI + Avastin was on 01/09/2018. CEA 5.0 on 01/09/2018. Cycle # 39 FOLFIRI + Avastin was on 01/23/2018. CEA 6.5 on 01/23/2018. Cycle # 40 FOLFIRI + Avastin was on 02/06/2018. CEA 6.9 on 02/06/2018. Cycle # 41 FOLFIRI + Avastin was on 02/20/2018. CEA 6.4 on 02/20/2018. Cycle # 42 FOLFIRI + Avastin was on 03/06/2018.  CEA 6.6 on 03/06/2018. Cycle # 43 FOLFIRI + Avastin was on 03/20/2018. CEA 5.7 on 03/20/2018. Bone scan on 03/26/2018 negative for metastatic disease. CT chest 03/26/2018 noted ? new 7 mm nodule in LUCY. CT abdomen/pelvis 03/26/2018 noted stable hypodense lesions in the liver. ? New small ascites in the abdomen. Patient wants to continue to monitor the lung finding and repeat scans in 2 months instead of changing the current chemotherapy regimen to oral Lonsurf. Cycle # 44 FOLFIRI + Avastin was on 04/03/2018. CEA 6.3 on 04/03/2018. Cycle # 45 FOLFIRI + Avastin was on 04/24/2018. CEA 5.3 on 04/24/2018. Cycle # 46 FOLFIRI + Avastin was on 05/08/2018. CEA 5.4 on 05/08/2018. Cycle # 47 FOLFIRI + Avastin was on 05/22/2018. CEA 6.1 on 05/22/2018. CT chest 05/31/2018 noted stable nodule in LUCY. No evidence of worsening malignancy. CT abdomen/pelvis on 05/31/2018 noted stable liver metastasis. No evidence of worsening malignancy. Continue FOLFIRI + Avastin and repeat scans in 3 months. Cycle # 48 FOLFIRI + Avastin was on 06/06/2018. CEA 6.3 on 06/05/2018. Cycle # 49 FOLFIRI + Avastin was on 06/19/2018. CEA 6.1 on 06/19/2018. Cycle # 50 FOLFIRI + Avastin was on 07/03/2018. CEA 7.4 on 07/03/2018. Cycle # 51 FOLFIRI + Avastin was on 07/24/2018. CEA 6.7 on 07/24/2018. Cycle # 52 FOLFIRI + Avastin was on 08/14/2018. CEA 6.8 on 08/14/2018. Cycle # 53 FOLFIRI + Avastin was on 08/28/2018. CEA 6.5 on 08/27/2018. CT chest 09/06/2018 noted interval decrease in size of LUCY measuring up to 4 mm, suggestive of treatment response. No mediastinal or hilar LN  CT abdomen/pelvis 09/06/2018 Slight interval increase in size of a previously noted metastatic lesion within the right hepatic lobe. This may be related to differences in phase of enhancement compared to the most recent study from 5/31/2018, the lesion remains decreased in size compared to the more previous studies.   No abdominal, retroperitoneal, or pelvic lymphadenopathy. Continue FOLFIRI + Avastin and repeat scans in 2 months. Cycle # 54 FOLFIRI + Avastin was on 09/11/2018. CEA 6.4 on 09/10/2018. Cycle # 55 FOLFIRI + Avastin was on 09/25/2018. CEA 6.4 on 09/25/2018. Cycle # 56 FOLFIRI + Avastin was on 10/09/2018. CEA 6.7 on 10/08/2018. Cycle # 57 FOLFIRI + Avastin was on 10/23/2018. CEA 7.2 on 10/22/2018. CT chest 11/02/2018 stable 3 mm nodule within LUCY. No new right and left lung nodule. No mediastinal or osseous lesion. CT abdomen/pelvis 11/02/2018 stable metastatic disease to liver. No new metastatic disease identified. No mesenteric or retroperitoneal LN. No gross colonic lesion identified. Continue FOLFIRI + Avastin and repeat scans in 3 months. Cycle # 58 FOLFIRI + Avastin was on 11/06/2018. CEA 7.6 on 11/05/2018. Cycle # 59 FOLFIRI + Avastin was on 11/27/2018. CEA 6.9 on 11/26/2018. Cycle # 60 FOLFIRI + Avastin was on 12/11/2018. CEA 6.7 on 12/11/2018. Cycle # 61 FOLFIRI + Avastin was on 01/08/2019. CEA 5.6 on 01/07/2019. Cycle # 62 FOLFIRI + Avastin was on 01/29/2019. CEA 4.6 on 01/28/2019. Cycle # 63 FOLFIRI + Avastin was on 02/12/2019. CEA 4.3 on 02/11/2019. CT chest 02/21/2019 no evidence of metastatic disease. CT abdomen/pelvis 02/21/2019 stable hypodense liver lesions. No evidence of worsening metastatic disease. 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 Ordered MRI thoracic spine and referred to neurosurgery team.  Cycle # 64 FOLFIRI + Avastin was on 02/26/2019. CEA 4.7 on 02/25/2019. Cycle # 65 FOLFIRI + Avastin was on 03/12/2019. CEA 4.0 on 03/11/2019. Cycle # 66 FOLFIRI + Avastin was on 03/26/2019. CEA 4.7 on 03/25/2019. Cycle # 67 FOLFIRI + Avastin was on 04/09/2019. CEA 5.6 on 04/08/2019. Cycle # 68 FOLFIRI + Avastin was on 04/30/2019. CEA 4.9 on 04/29/2019. Cycle # 69 FOLFIRI + Avastin was on 05/14/2019. CEA 5.3 on 05/14/2019.    CT chest 05/23/2019 stable small lung nodule with no evidence of metastatic disease. CT abdomen/pelvis 05/23/2019 Decrease conspicuity of the liver lesions. No new lesions seen. Stable splenomegaly. Continue FOLFIRI + Avastin and repeat scans in 3 months. Cycle # 70 FOLFIRI + Avastin is today 06/04/2019. CEA 4.8 on 06/03/2019. RTC in 2 weeks for Cycle # 71 FOLFIRI + Avastin.    MSI testing noted no mismatch repair protein loss of expression    6/4/2019  Ok Oquendo MD  Board Certified Medical Oncologist

## 2019-06-06 ENCOUNTER — HOSPITAL ENCOUNTER (OUTPATIENT)
Dept: INFUSION THERAPY | Age: 69
Discharge: HOME | End: 2019-06-06
Payer: MEDICARE

## 2019-06-06 DIAGNOSIS — C20 RECTAL CANCER METASTASIZED TO LIVER (HCC): Primary | ICD-10-CM

## 2019-06-06 DIAGNOSIS — C78.7 RECTAL CANCER METASTASIZED TO LIVER (HCC): Primary | ICD-10-CM

## 2019-06-06 PROCEDURE — 6360000002 HC RX W HCPCS: Performed by: INTERNAL MEDICINE

## 2019-06-06 PROCEDURE — 96372 THER/PROPH/DIAG INJ SC/IM: CPT

## 2019-06-06 RX ADMIN — PEGFILGRASTIM 6 MG: 6 INJECTION SUBCUTANEOUS at 15:18

## 2019-06-10 ENCOUNTER — HOSPITAL ENCOUNTER (EMERGENCY)
Age: 69
Discharge: HOME | End: 2019-06-10
Attending: EMERGENCY MEDICINE
Payer: MEDICARE

## 2019-06-10 ENCOUNTER — APPOINTMENT (OUTPATIENT)
Dept: GENERAL RADIOLOGY | Age: 69
End: 2019-06-10
Payer: MEDICARE

## 2019-06-10 VITALS
HEART RATE: 62 BPM | SYSTOLIC BLOOD PRESSURE: 118 MMHG | HEIGHT: 72 IN | OXYGEN SATURATION: 100 % | DIASTOLIC BLOOD PRESSURE: 71 MMHG | RESPIRATION RATE: 17 BRPM | WEIGHT: 194 LBS | TEMPERATURE: 97.9 F | BODY MASS INDEX: 26.28 KG/M2

## 2019-06-10 DIAGNOSIS — D72.829 LEUKOCYTOSIS, UNSPECIFIED TYPE: Primary | ICD-10-CM

## 2019-06-10 LAB
ANION GAP SERPL CALCULATED.3IONS-SCNC: 10 MMOL/L (ref 7–16)
ANISOCYTOSIS: ABNORMAL
BACTERIA: NORMAL /HPF
BASOPHILS ABSOLUTE: 0 E9/L (ref 0–0.2)
BASOPHILS RELATIVE PERCENT: 0 % (ref 0–2)
BILIRUBIN URINE: NEGATIVE
BLOOD, URINE: NEGATIVE
BUN BLDV-MCNC: 24 MG/DL (ref 8–23)
CALCIUM SERPL-MCNC: 8.8 MG/DL (ref 8.6–10.2)
CHLORIDE BLD-SCNC: 105 MMOL/L (ref 98–107)
CLARITY: CLEAR
CO2: 25 MMOL/L (ref 22–29)
COLOR: YELLOW
CREAT SERPL-MCNC: 0.9 MG/DL (ref 0.7–1.2)
EOSINOPHILS ABSOLUTE: 0.26 E9/L (ref 0.05–0.5)
EOSINOPHILS RELATIVE PERCENT: 2 % (ref 0–6)
GFR AFRICAN AMERICAN: >60
GFR NON-AFRICAN AMERICAN: >60 ML/MIN/1.73
GLUCOSE BLD-MCNC: 113 MG/DL (ref 74–99)
GLUCOSE URINE: NEGATIVE MG/DL
HCT VFR BLD CALC: 27 % (ref 37–54)
HEMOGLOBIN: 8.4 G/DL (ref 12.5–16.5)
KETONES, URINE: NEGATIVE MG/DL
LEUKOCYTE ESTERASE, URINE: NEGATIVE
LYMPHOCYTES ABSOLUTE: 1.28 E9/L (ref 1.5–4)
LYMPHOCYTES RELATIVE PERCENT: 10 % (ref 20–42)
MCH RBC QN AUTO: 32.4 PG (ref 26–35)
MCHC RBC AUTO-ENTMCNC: 31.1 % (ref 32–34.5)
MCV RBC AUTO: 104.2 FL (ref 80–99.9)
MONOCYTES ABSOLUTE: 0 E9/L (ref 0.1–0.95)
MONOCYTES RELATIVE PERCENT: 0 % (ref 2–12)
NEUTROPHILS ABSOLUTE: 11.26 E9/L (ref 1.8–7.3)
NEUTROPHILS RELATIVE PERCENT: 88 % (ref 43–80)
NITRITE, URINE: NEGATIVE
OVALOCYTES: ABNORMAL
PDW BLD-RTO: 17.1 FL (ref 11.5–15)
PH UA: 6 (ref 5–9)
PLATELET # BLD: 84 E9/L (ref 130–450)
PLATELET CONFIRMATION: NORMAL
PMV BLD AUTO: 12.7 FL (ref 7–12)
POIKILOCYTES: ABNORMAL
POTASSIUM SERPL-SCNC: 4 MMOL/L (ref 3.5–5)
PROTEIN UA: NEGATIVE MG/DL
RBC # BLD: 2.59 E12/L (ref 3.8–5.8)
RBC UA: NORMAL /HPF (ref 0–2)
SODIUM BLD-SCNC: 140 MMOL/L (ref 132–146)
SPECIFIC GRAVITY UA: 1.01 (ref 1–1.03)
TEAR DROP CELLS: ABNORMAL
UROBILINOGEN, URINE: 1 E.U./DL
WBC # BLD: 12.8 E9/L (ref 4.5–11.5)
WBC UA: NORMAL /HPF (ref 0–5)

## 2019-06-10 PROCEDURE — 36415 COLL VENOUS BLD VENIPUNCTURE: CPT

## 2019-06-10 PROCEDURE — 99283 EMERGENCY DEPT VISIT LOW MDM: CPT

## 2019-06-10 PROCEDURE — 81001 URINALYSIS AUTO W/SCOPE: CPT

## 2019-06-10 PROCEDURE — 85025 COMPLETE CBC W/AUTO DIFF WBC: CPT

## 2019-06-10 PROCEDURE — 80048 BASIC METABOLIC PNL TOTAL CA: CPT

## 2019-06-10 PROCEDURE — 71045 X-RAY EXAM CHEST 1 VIEW: CPT

## 2019-06-10 ASSESSMENT — ENCOUNTER SYMPTOMS
BACK PAIN: 0
EYE DISCHARGE: 0
SHORTNESS OF BREATH: 0
WHEEZING: 0
DIARRHEA: 0
ABDOMINAL PAIN: 0
COUGH: 0
EYE REDNESS: 0
NAUSEA: 0
SORE THROAT: 0
EYE PAIN: 0
SINUS PRESSURE: 0
VOMITING: 0

## 2019-06-10 ASSESSMENT — PAIN DESCRIPTION - PROGRESSION: CLINICAL_PROGRESSION: NOT CHANGED

## 2019-06-10 ASSESSMENT — PAIN - FUNCTIONAL ASSESSMENT: PAIN_FUNCTIONAL_ASSESSMENT: PREVENTS OR INTERFERES SOME ACTIVE ACTIVITIES AND ADLS

## 2019-06-10 ASSESSMENT — PAIN DESCRIPTION - ORIENTATION: ORIENTATION: MID

## 2019-06-10 ASSESSMENT — PAIN DESCRIPTION - LOCATION: LOCATION: BACK

## 2019-06-10 ASSESSMENT — PAIN DESCRIPTION - DESCRIPTORS: DESCRIPTORS: ACHING;SORE;DULL

## 2019-06-10 ASSESSMENT — PAIN SCALES - GENERAL: PAINLEVEL_OUTOF10: 3

## 2019-06-10 ASSESSMENT — PAIN DESCRIPTION - PAIN TYPE: TYPE: CHRONIC PAIN

## 2019-06-10 ASSESSMENT — PAIN DESCRIPTION - ONSET: ONSET: ON-GOING

## 2019-06-10 ASSESSMENT — PAIN DESCRIPTION - FREQUENCY: FREQUENCY: CONTINUOUS

## 2019-06-10 NOTE — ED PROVIDER NOTES
Patient is a 69-year-old male who presents the emergency department to be evaluated for a elevated white blood cell count that was drawn 3 days ago. The patient states that he received a Neulasta injection the day prior. There is a 17-hour interval between the injection and the blood work being drawn. Patient states that last week on 6/4 he underwent a 46-hour chemotherapy treatment. He states that during the treatment he felt somewhat nauseated, but there were no other abnormalities. Since the treatment has terminated he has felt his normal self without any acute complaints. Is been no fevers or chills. There is no cough. He denies any hematuria or dysuria. No urinary urgency. He denies any abdominal pain. There are no new rashes. His wife states that he is been his normal self. He presented to the emergency department due to the elevated white blood cell count. He was informed by his PCP today that the count came back elevated. The history is provided by the patient. Review of Systems   Constitutional: Negative for chills and fever. HENT: Negative for ear pain, sinus pressure and sore throat. Eyes: Negative for pain, discharge and redness. Respiratory: Negative for cough, shortness of breath and wheezing. Cardiovascular: Negative for chest pain. Gastrointestinal: Negative for abdominal pain, diarrhea, nausea and vomiting. Genitourinary: Negative for dysuria and frequency. Musculoskeletal: Negative for arthralgias and back pain. Skin: Negative for rash and wound. Neurological: Negative for weakness and headaches. Hematological: Negative for adenopathy. All other systems reviewed and are negative. Physical Exam   Constitutional: He appears well-developed and well-nourished. No distress. HENT:   Mouth/Throat: Mucous membranes are normal. No oropharyngeal exudate, posterior oropharyngeal edema or posterior oropharyngeal erythema.    Cardiovascular: Normal rate, regular rhythm, normal heart sounds and intact distal pulses. No murmur heard. Pulmonary/Chest: Effort normal and breath sounds normal. No stridor. No respiratory distress. He has no wheezes. He has no rales. Abdominal: Soft. He exhibits no distension and no mass. There is no tenderness. There is no rebound and no guarding. Skin: Skin is warm and dry. No rash noted. He is not diaphoretic. Nursing note and vitals reviewed. Procedures    MDM  Number of Diagnoses or Management Options  Leukocytosis, unspecified type:   Diagnosis management comments: WBC trending down. Patient feels at his baseline without any infectious symptoms. Work-up does not reveal any infectious processes. I believe the patient's elevated white blood cell count was secondary to his Neulasta injection that he had performed the day before. Return instructions were provided. He is encouraged to keep his appointment with his PCP in 5 days. The patient understands and is agreeable to this plan. He is discharged in stable condition. ED Course as of Octaviano 10 1139   Mon Octaviano 10, 2019   1031   ATTENDING PROVIDER ATTESTATION:     I have personally performed and/or participated in the history, exam, medical decision making, and procedures and agree with all pertinent clinical information unless otherwise noted. I have also reviewed and agree with the past medical, family and social history unless otherwise noted. I have discussed this patient in detail with the resident and provided the instruction and education regarding the evidence-based evaluation and treatment of [unfilled]  History: patient presents with concern for elevated WBC count. He states it was drawn roughly 12 hours after his Neulasta injection. He denies complaints or fevers. My findings: Jacquelyn Hull is a 78 y.o. male whom is in no distress. Physical exam reveals well appearing. Heart RRR, lungs CTA, abdomen is soft and nontender.  No peripheral edema. No wounds. No focal neurologic deficits. My plan: Symptomatic and supportive care. We redraw labs and discuss with Dr Nelly Castrejon. Electronically signed by Sheryl Rivera DO on 6/10/19 at 10:31 AM          [JS]      ED Course User Index  [JS] Sheryl Rivera DO       ED Course as of Octaviano 10 1139   Mon Octaviano 10, 2019   1031   ATTENDING PROVIDER ATTESTATION:     I have personally performed and/or participated in the history, exam, medical decision making, and procedures and agree with all pertinent clinical information unless otherwise noted. I have also reviewed and agree with the past medical, family and social history unless otherwise noted. I have discussed this patient in detail with the resident and provided the instruction and education regarding the evidence-based evaluation and treatment of [unfilled]  History: patient presents with concern for elevated WBC count. He states it was drawn roughly 12 hours after his Neulasta injection. He denies complaints or fevers. My findings: Kirt Cruz is a 78 y.o. male whom is in no distress. Physical exam reveals well appearing. Heart RRR, lungs CTA, abdomen is soft and nontender. No peripheral edema. No wounds. No focal neurologic deficits. My plan: Symptomatic and supportive care. We redraw labs and discuss with Dr Nelly Castrejon. Electronically signed by Sheryl Rivera DO on 6/10/19 at 10:31 AM          [JS]      ED Course User Index  [JS] Sheryl Rivera DO       --------------------------------------------- PAST HISTORY ---------------------------------------------  Past Medical History:  has a past medical history of Arthritis, Cancer ([facility] [address] Depression, Hyperlipidemia, and Hypertension. Past Surgical History:  has a past surgical history that includes Knee arthroscopy (Right); Tonsillectomy (Bilateral); Colonoscopy;  Colonoscopy (11/2/15); liver biopsy (11/12/15); skin full graft; and fracture surgery. Social History:  reports that he quit smoking about 18 years ago. His smoking use included cigarettes. He has a 40.00 pack-year smoking history. He has never used smokeless tobacco. He reports that he does not drink alcohol or use drugs. Family History: family history includes Cancer in his father and mother; Diabetes in his brother. The patients home medications have been reviewed.     Allergies: Neosporin [neomycin-polymyxin-gramicidin] and Tape [adhesive tape]    -------------------------------------------------- RESULTS -------------------------------------------------  Labs:  Results for orders placed or performed during the hospital encounter of 06/10/19   CBC auto differential   Result Value Ref Range    WBC 12.8 (H) 4.5 - 11.5 E9/L    RBC 2.59 (L) 3.80 - 5.80 E12/L    Hemoglobin 8.4 (L) 12.5 - 16.5 g/dL    Hematocrit 27.0 (L) 37.0 - 54.0 %    .2 (H) 80.0 - 99.9 fL    MCH 32.4 26.0 - 35.0 pg    MCHC 31.1 (L) 32.0 - 34.5 %    RDW 17.1 (H) 11.5 - 15.0 fL    Platelets 84 (L) 268 - 450 E9/L    MPV 12.7 (H) 7.0 - 12.0 fL    Neutrophils % 88.0 (H) 43.0 - 80.0 %    Lymphocytes % 10.0 (L) 20.0 - 42.0 %    Monocytes % 0.0 (L) 2.0 - 12.0 %    Eosinophils % 2.0 0.0 - 6.0 %    Basophils % 0.0 0.0 - 2.0 %    Neutrophils # 11.26 (H) 1.80 - 7.30 E9/L    Lymphocytes # 1.28 (L) 1.50 - 4.00 E9/L    Monocytes # 0.00 (L) 0.10 - 0.95 E9/L    Eosinophils # 0.26 0.05 - 0.50 E9/L    Basophils # 0.00 0.00 - 0.20 E9/L    Anisocytosis 1+     Poikilocytes 1+     Ovalocytes 1+     Tear Drop Cells 1+    Basic metabolic panel   Result Value Ref Range    Sodium 140 132 - 146 mmol/L    Potassium 4.0 3.5 - 5.0 mmol/L    Chloride 105 98 - 107 mmol/L    CO2 25 22 - 29 mmol/L    Anion Gap 10 7 - 16 mmol/L    Glucose 113 (H) 74 - 99 mg/dL    BUN 24 (H) 8 - 23 mg/dL    CREATININE 0.9 0.7 - 1.2 mg/dL    GFR Non-African American >60 >=60 mL/min/1.73    GFR African American >60     Calcium 8.8 8.6 - 10.2 mg/dL   Urinalysis with Microscopic   Result Value Ref Range    Color, UA Yellow Straw/Yellow    Clarity, UA Clear Clear    Glucose, Ur Negative Negative mg/dL    Bilirubin Urine Negative Negative    Ketones, Urine Negative Negative mg/dL    Specific Gravity, UA 1.015 1.005 - 1.030    Blood, Urine Negative Negative    pH, UA 6.0 5.0 - 9.0    Protein, UA Negative Negative mg/dL    Urobilinogen, Urine 1.0 <2.0 E.U./dL    Nitrite, Urine Negative Negative    Leukocyte Esterase, Urine Negative Negative    WBC, UA NONE 0 - 5 /HPF    RBC, UA NONE 0 - 2 /HPF    Bacteria, UA NONE /HPF   Platelet Confirmation   Result Value Ref Range    Platelet Confirmation CONFIRMED        Radiology:  XR CHEST PORTABLE   Final Result   1. No active pulmonary disease.           ------------------------- NURSING NOTES AND VITALS REVIEWED ---------------------------  Date / Time Roomed:  6/10/2019 10:07 AM  ED Bed Assignment:  06/06    The nursing notes within the ED encounter and vital signs as below have been reviewed. /71   Pulse 62   Temp 97.9 °F (36.6 °C) (Oral)   Resp 17   Ht 6' (1.829 m)   Wt 194 lb (88 kg)   SpO2 100%   BMI 26.31 kg/m²   Oxygen Saturation Interpretation: Normal      ------------------------------------------ PROGRESS NOTES ------------------------------------------  I have spoken with the patient and discussed todays results, in addition to providing specific details for the plan of care and counseling regarding the diagnosis and prognosis. Their questions are answered at this time and they are agreeable with the plan. I discussed at length with them reasons for immediate return here for re evaluation. They will followup with primary care by calling their office tomorrow. --------------------------------- ADDITIONAL PROVIDER NOTES ---------------------------------  At this time the patient is without objective evidence of an acute process requiring hospitalization or inpatient management.   They have remained hemodynamically stable throughout their entire ED visit and are stable for discharge with outpatient follow-up. The plan has been discussed in detail and they are aware of the specific conditions for emergent return, as well as the importance of follow-up. New Prescriptions    No medications on file       Diagnosis:  1. Leukocytosis, unspecified type        Disposition:  Patient's disposition: Discharge to home  Patient's condition is stable.               Julian Shields DO  Resident  06/10/19 1143

## 2019-06-10 NOTE — ED NOTES
Patient and SO verbalized understanding of discharge instructions and will follow up with PCP/GI as needed or return to ED if symptoms worsen     Theresa Foster RN  06/10/19 [id number]

## 2019-06-17 ENCOUNTER — HOSPITAL ENCOUNTER (OUTPATIENT)
Age: 69
Discharge: HOME | End: 2019-06-17
Payer: MEDICARE

## 2019-06-17 DIAGNOSIS — C78.7 RECTAL CANCER METASTASIZED TO LIVER (HCC): ICD-10-CM

## 2019-06-17 DIAGNOSIS — C20 RECTAL CANCER METASTASIZED TO LIVER (HCC): ICD-10-CM

## 2019-06-17 DIAGNOSIS — C79.51 BONE METASTASIS (HCC): ICD-10-CM

## 2019-06-17 LAB
ALBUMIN SERPL-MCNC: 3.7 G/DL (ref 3.5–5.2)
ALP BLD-CCNC: 174 U/L (ref 40–129)
ALT SERPL-CCNC: 18 U/L (ref 0–40)
ANION GAP SERPL CALCULATED.3IONS-SCNC: 11 MMOL/L (ref 7–16)
ANISOCYTOSIS: ABNORMAL
AST SERPL-CCNC: 24 U/L (ref 0–39)
BASOPHILS ABSOLUTE: 0.02 E9/L (ref 0–0.2)
BASOPHILS RELATIVE PERCENT: 0.9 % (ref 0–2)
BILIRUB SERPL-MCNC: 0.7 MG/DL (ref 0–1.2)
BUN BLDV-MCNC: 16 MG/DL (ref 8–23)
CALCIUM SERPL-MCNC: 8 MG/DL (ref 8.6–10.2)
CEA: 4.6 NG/ML (ref 0–5.2)
CHLORIDE BLD-SCNC: 108 MMOL/L (ref 98–107)
CO2: 26 MMOL/L (ref 22–29)
CREAT SERPL-MCNC: 1 MG/DL (ref 0.7–1.2)
EOSINOPHILS ABSOLUTE: 0.09 E9/L (ref 0.05–0.5)
EOSINOPHILS RELATIVE PERCENT: 3.5 % (ref 0–6)
GFR AFRICAN AMERICAN: >60
GFR NON-AFRICAN AMERICAN: >60 ML/MIN/1.73
GLUCOSE BLD-MCNC: 116 MG/DL (ref 74–99)
HCT VFR BLD CALC: 27.3 % (ref 37–54)
HEMOGLOBIN: 8.5 G/DL (ref 12.5–16.5)
LYMPHOCYTES ABSOLUTE: 0.5 E9/L (ref 1.5–4)
LYMPHOCYTES RELATIVE PERCENT: 20 % (ref 20–42)
MCH RBC QN AUTO: 32.4 PG (ref 26–35)
MCHC RBC AUTO-ENTMCNC: 31.1 % (ref 32–34.5)
MCV RBC AUTO: 104.2 FL (ref 80–99.9)
MONOCYTES ABSOLUTE: 0.05 E9/L (ref 0.1–0.95)
MONOCYTES RELATIVE PERCENT: 1.7 % (ref 2–12)
NEUTROPHILS ABSOLUTE: 1.85 E9/L (ref 1.8–7.3)
NEUTROPHILS RELATIVE PERCENT: 73.9 % (ref 43–80)
OVALOCYTES: ABNORMAL
PDW BLD-RTO: 16.8 FL (ref 11.5–15)
PLATELET # BLD: 80 E9/L (ref 130–450)
PLATELET CONFIRMATION: NORMAL
PMV BLD AUTO: 9 FL (ref 7–12)
POIKILOCYTES: ABNORMAL
POLYCHROMASIA: ABNORMAL
POTASSIUM SERPL-SCNC: 3.6 MMOL/L (ref 3.5–5)
RBC # BLD: 2.62 E12/L (ref 3.8–5.8)
SODIUM BLD-SCNC: 145 MMOL/L (ref 132–146)
TEAR DROP CELLS: ABNORMAL
TOTAL PROTEIN: 6.3 G/DL (ref 6.4–8.3)
WBC # BLD: 2.5 E9/L (ref 4.5–11.5)

## 2019-06-17 PROCEDURE — 85025 COMPLETE CBC W/AUTO DIFF WBC: CPT

## 2019-06-17 PROCEDURE — 82378 CARCINOEMBRYONIC ANTIGEN: CPT

## 2019-06-17 PROCEDURE — 80053 COMPREHEN METABOLIC PANEL: CPT

## 2019-06-17 PROCEDURE — 36415 COLL VENOUS BLD VENIPUNCTURE: CPT

## 2019-06-18 ENCOUNTER — OFFICE VISIT (OUTPATIENT)
Dept: ONCOLOGY | Age: 69
End: 2019-06-18
Payer: MEDICARE

## 2019-06-18 ENCOUNTER — HOSPITAL ENCOUNTER (OUTPATIENT)
Dept: INFUSION THERAPY | Age: 69
Discharge: HOME | End: 2019-06-18
Payer: MEDICARE

## 2019-06-18 VITALS
SYSTOLIC BLOOD PRESSURE: 105 MMHG | WEIGHT: 197.1 LBS | TEMPERATURE: 98.1 F | BODY MASS INDEX: 26.7 KG/M2 | RESPIRATION RATE: 20 BRPM | HEIGHT: 72 IN | DIASTOLIC BLOOD PRESSURE: 69 MMHG | HEART RATE: 62 BPM

## 2019-06-18 VITALS — HEART RATE: 58 BPM | SYSTOLIC BLOOD PRESSURE: 122 MMHG | RESPIRATION RATE: 20 BRPM | DIASTOLIC BLOOD PRESSURE: 67 MMHG

## 2019-06-18 DIAGNOSIS — C78.7 RECTAL CANCER METASTASIZED TO LIVER (HCC): Primary | ICD-10-CM

## 2019-06-18 DIAGNOSIS — C20 RECTAL CANCER METASTASIZED TO LIVER (HCC): Primary | ICD-10-CM

## 2019-06-18 DIAGNOSIS — Z09 FOLLOW UP: Primary | ICD-10-CM

## 2019-06-18 PROCEDURE — 96411 CHEMO IV PUSH ADDL DRUG: CPT

## 2019-06-18 PROCEDURE — 96375 TX/PRO/DX INJ NEW DRUG ADDON: CPT

## 2019-06-18 PROCEDURE — 6360000002 HC RX W HCPCS: Performed by: INTERNAL MEDICINE

## 2019-06-18 PROCEDURE — 96417 CHEMO IV INFUS EACH ADDL SEQ: CPT

## 2019-06-18 PROCEDURE — 96415 CHEMO IV INFUSION ADDL HR: CPT

## 2019-06-18 PROCEDURE — 96368 THER/DIAG CONCURRENT INF: CPT

## 2019-06-18 PROCEDURE — 2580000003 HC RX 258: Performed by: INTERNAL MEDICINE

## 2019-06-18 PROCEDURE — 96413 CHEMO IV INFUSION 1 HR: CPT

## 2019-06-18 PROCEDURE — 99214 OFFICE O/P EST MOD 30 MIN: CPT | Performed by: INTERNAL MEDICINE

## 2019-06-18 RX ORDER — SODIUM CHLORIDE 0.9 % (FLUSH) 0.9 %
10 SYRINGE (ML) INJECTION PRN
Status: DISCONTINUED | OUTPATIENT
Start: 2019-06-18 | End: 2019-06-19 | Stop reason: HOSPADM

## 2019-06-18 RX ORDER — SODIUM CHLORIDE 9 MG/ML
INJECTION, SOLUTION INTRAVENOUS CONTINUOUS
Status: CANCELLED | OUTPATIENT
Start: 2019-06-18

## 2019-06-18 RX ORDER — SODIUM CHLORIDE 9 MG/ML
250 INJECTION, SOLUTION INTRAVENOUS CONTINUOUS
Status: CANCELLED | OUTPATIENT
Start: 2019-06-18

## 2019-06-18 RX ORDER — 0.9 % SODIUM CHLORIDE 0.9 %
10 VIAL (ML) INJECTION ONCE
Status: CANCELLED | OUTPATIENT
Start: 2019-06-18

## 2019-06-18 RX ORDER — METHYLPREDNISOLONE SODIUM SUCCINATE 125 MG/2ML
125 INJECTION, POWDER, LYOPHILIZED, FOR SOLUTION INTRAMUSCULAR; INTRAVENOUS ONCE
Status: CANCELLED | OUTPATIENT
Start: 2019-06-18

## 2019-06-18 RX ORDER — ATROPINE SULFATE 0.4 MG/ML
0.4 AMPUL (ML) INJECTION ONCE
Status: COMPLETED | OUTPATIENT
Start: 2019-06-18 | End: 2019-06-18

## 2019-06-18 RX ORDER — SODIUM CHLORIDE 9 MG/ML
250 INJECTION, SOLUTION INTRAVENOUS CONTINUOUS
Status: DISCONTINUED | OUTPATIENT
Start: 2019-06-18 | End: 2019-06-19 | Stop reason: HOSPADM

## 2019-06-18 RX ORDER — FLUOROURACIL 50 MG/ML
850 INJECTION, SOLUTION INTRAVENOUS ONCE
Status: CANCELLED | OUTPATIENT
Start: 2019-06-18

## 2019-06-18 RX ORDER — SODIUM CHLORIDE 0.9 % (FLUSH) 0.9 %
10 SYRINGE (ML) INJECTION PRN
Status: CANCELLED | OUTPATIENT
Start: 2019-06-18

## 2019-06-18 RX ORDER — EPINEPHRINE 1 MG/ML
0.3 INJECTION, SOLUTION, CONCENTRATE INTRAVENOUS PRN
Status: CANCELLED | OUTPATIENT
Start: 2019-06-18

## 2019-06-18 RX ORDER — PALONOSETRON HYDROCHLORIDE 0.05 MG/ML
0.25 INJECTION, SOLUTION INTRAVENOUS ONCE
Status: CANCELLED | OUTPATIENT
Start: 2019-06-18

## 2019-06-18 RX ORDER — HEPARIN SODIUM (PORCINE) LOCK FLUSH IV SOLN 100 UNIT/ML 100 UNIT/ML
500 SOLUTION INTRAVENOUS PRN
Status: CANCELLED | OUTPATIENT
Start: 2019-06-18

## 2019-06-18 RX ORDER — DEXAMETHASONE SODIUM PHOSPHATE 10 MG/ML
10 INJECTION INTRAMUSCULAR; INTRAVENOUS ONCE
Status: COMPLETED | OUTPATIENT
Start: 2019-06-18 | End: 2019-06-18

## 2019-06-18 RX ORDER — DEXAMETHASONE SODIUM PHOSPHATE 10 MG/ML
10 INJECTION, SOLUTION INTRAMUSCULAR; INTRAVENOUS ONCE
Status: CANCELLED | OUTPATIENT
Start: 2019-06-18

## 2019-06-18 RX ORDER — PALONOSETRON HYDROCHLORIDE 0.05 MG/ML
0.25 INJECTION, SOLUTION INTRAVENOUS ONCE
Status: COMPLETED | OUTPATIENT
Start: 2019-06-18 | End: 2019-06-18

## 2019-06-18 RX ORDER — DIPHENHYDRAMINE HYDROCHLORIDE 50 MG/ML
50 INJECTION INTRAMUSCULAR; INTRAVENOUS ONCE
Status: CANCELLED | OUTPATIENT
Start: 2019-06-18

## 2019-06-18 RX ORDER — FLUOROURACIL 50 MG/ML
850 INJECTION, SOLUTION INTRAVENOUS ONCE
Status: COMPLETED | OUTPATIENT
Start: 2019-06-18 | End: 2019-06-18

## 2019-06-18 RX ORDER — ATROPINE SULFATE 0.4 MG/ML
0.4 AMPUL (ML) INJECTION ONCE
Status: CANCELLED | OUTPATIENT
Start: 2019-06-18

## 2019-06-18 RX ORDER — HEPARIN SODIUM (PORCINE) LOCK FLUSH IV SOLN 100 UNIT/ML 100 UNIT/ML
500 SOLUTION INTRAVENOUS PRN
Status: DISCONTINUED | OUTPATIENT
Start: 2019-06-18 | End: 2019-06-19 | Stop reason: HOSPADM

## 2019-06-18 RX ADMIN — SODIUM CHLORIDE 250 ML: 9 INJECTION, SOLUTION INTRAVENOUS at 09:13

## 2019-06-18 RX ADMIN — SODIUM CHLORIDE 400 MG: 9 INJECTION, SOLUTION INTRAVENOUS at 10:23

## 2019-06-18 RX ADMIN — Medication 500 UNITS: at 12:07

## 2019-06-18 RX ADMIN — PALONOSETRON 0.25 MG: 0.25 INJECTION, SOLUTION INTRAVENOUS at 09:15

## 2019-06-18 RX ADMIN — Medication 10 ML: at 12:07

## 2019-06-18 RX ADMIN — FLUOROURACIL 850 MG: 50 INJECTION, SOLUTION INTRAVENOUS at 12:06

## 2019-06-18 RX ADMIN — BEVACIZUMAB 400 MG: 400 INJECTION, SOLUTION INTRAVENOUS at 09:35

## 2019-06-18 RX ADMIN — DEXAMETHASONE SODIUM PHOSPHATE 10 MG: 10 INJECTION INTRAMUSCULAR; INTRAVENOUS at 09:17

## 2019-06-18 RX ADMIN — ATROPINE SULFATE 0.4 MG: 0.4 INJECTION, SOLUTION INTRAMUSCULAR; INTRAVENOUS; SUBCUTANEOUS at 09:15

## 2019-06-18 RX ADMIN — LEUCOVORIN CALCIUM 850 MG: 10 INJECTION INTRAMUSCULAR; INTRAVENOUS at 10:23

## 2019-06-18 NOTE — PROGRESS NOTES
[place] [id number]  Attending Clinic Note     Reason for Visit: Follow-up on a patient with Metastatic Rectal Cancer.     PCP: Adilia Smith MD     History of Present Illness:  79 y/o  male who was referred to see Dr. Caitlyn Zavaleta (GI team) for evaluation of bright red blood per rectum, mild anemia and change in bowel habits with diarrhea. CEA 2640 on 10/19/2015. AlcP 299 AST 57 ALT 75 on 10/19/2015. Colonoscopy in 2013 noted no significant polyps, colitis or lesions at that time. Denies any Family History of colorectal cancer or polyps.     Colonoscopy on 10/19/2015 revealed:  1. Ascending polyp, 8 mm, hot snare: Tubulovillous adenoma. 2. Transverse polyp, 1 cm, hot snare: Serrated polyp most consistent with sessile serrated adenoma. 3. Five splenic flexure polyps, three - 5 mm, 7 mm, 8 mm, hot snare and biopsy: Four segments of Tubular Adenoma  4. Descending polyp, 5 mm, biopsy: Tubular adenoma. 5. Sigmoid polyp, 4 mm biopsy, Serrated polyp most consistent with sessile serrated adenoma. 6. Two rectal polyps, 4 mm biopsy: Serrated polyp most consistent with hyperplastic polyp. 7. Rectosigmoid colon mass (large mass approximately 65% circumference of the lumen; Very friable, firm and hard): Tubulovillous adenoma with associated focal erosion and fibroplasia.      CT scan abdomen/pelvis on 10/26/2015:  1. Small nodules at lung bases likely represent metastatic colon   cancer. 2. Extensive likely metastatic colon cancer throughout the liver.      3. Mild mural thickening and luminal narrowing in the   terminal ileum, otherwise nonspecific.      Colonoscopy with snare removal rectal mass was performed by Dr. Chun Galvez. Pathology proved:  Rectal polyp: Invasive adenocarcinoma involving villous adenoma and extending to the cauterized edge of excision. KRAS Mutation: Mutation detected. BRAF Mutation: Mutation not detected.   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     CEA 3488. Bone scan on 11/10/2015 noted no metastatic disease. CT chest on 11/10/2015 revealed Multiple pulmonary nodules in the upper and lower lobes consistent with metastatic disease;   Hepatic metastasis also visualized. 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 Mediport was placed. Cycle # 1 of FOLFOX + Avastin was on 11/23/2015. CEA was 4555 on 11/23/2015. Cycle # 2 of FOLFOX + Avastin was on 12/08/2015. CEA was 3160 on 12/08/2015. Cycle # 3 of FOLFOX + Avastin was on 12/22/2015. CEA was 2516 on 12/21/2015. Cycle # 4 of FOLFOX + Avastin was on 01/05/2016. CEA was 2098 on 01/05/2015. Cycle # 5 of FOLFOX + Avastin was on 01/19/2016. CEA was 1511 on 01/05/2015.     -Bone scan on 01/26/2016 noted no metastatic disease. CT chest on 01/26/2016 revealed Significant response to treatment with no visible residual nodules. CT scan abdomen/pelvis on 01/26/2016 noted Interval decreased size of multiple masses in the liver compatible with treatment response. Continue another 2 months of FOLFOX + Avastin and repeat scans. Cycle # 6 of FOLFOX + Avastin was on 02/02/2016.  on 02/02/2016. Cycle # 7 of FOLFOX + Avastin was on 02/16/2016.  on 02/16/2016. Cycle # 8 of FOLFOX + Avastin was on 03/01/2016.  on 03/01/2016. Cycle # 9 of FOLFOX + Avastin was on 03/15/2016.  on 03/15/2016. Cycle # 10 of FOLFOX + Avastin was on 03/29/2016. .4 on 03/29/2016. Cycle # 11 of FOLFOX + Avastin was on 04/12/2016. .4 on 04/12/2016.     Re-staging scans on 04/19/2016: CT Chest: clear lungs; no evidence of recurring pulmonary nodule; CT Abdomen/Pelvis: Further interval decrease in size of the multiple metastatic hepatic lesions, the largest lesion now measures 3.9 x 3.5 cm and previously measured 4.5 cm in maximum diameter. No mesenteric lymphadenopathy is identified; Bone Scan: No evidence of osseous metastasis.   Continue same regimen and re-stage in 2-3 months. Cycle # 12 FOLFOX + Avastin was on 04/26/2016. .5 on 04/26/2016. Cycle # 13 FOLFOX + Avastin was on 05/10/2016. .3 on 05/10/2016. Cycle # 14 FOLFOX+AVASTIN was on 05/24/2016. .5 on 05/24/2016. Cycle # 15 FOLFOX + Avastin was on 06/07/2016. CEA 90.5 on 06/07/2016. Admitted to [facility] 06/13/2016-06/16/2016 for abdominal pain: EGD noted 1.5 cm clean based duodenal bulb ulceration s/p epinephrine and bicap per Dr. Dileep Jarrett. No active bleeding. A. Stomach, biopsy: Mild chronic gastritis, immunostain negative for Helicobacter  B. Esophagus, biopsy: Gastric glandular mucosa with prominent intestinal metaplasia ([place]'s epithelium), negative for epithelial dysplasia, esophageal squamous mucosa not identified  Cycle # 16 FOLFOX (discontinued avastin (bevacizumab) given association of peptic ulcer disease and known association of GI perforation) was on 06/21/2016. Cycle # 17 FOLFOX was on 07/05/2016. CEA 52.6 on 07/19/2016. Cycle # 17 FOLFOX was on 07/05/2016. CEA 52.6 on 07/05/2016. CEA 47.6 on 07/19/2016.     Re-staging scans 07/19/2106: CT Chest negative for metastatic disease. CT Abdomen/Pelvis: Stable hepatic lesions; Question new mural thickening in the cecum. Bone Scan: New Let hip lesion suspicious for bone metastasis.     Increased CEA; new mural thickening in the cecum and new left hip lesion suspicious for bone metastasis are consistent with disease progression; He derived maximum benefit from FOLFOX/AVASTIN. D/C FOLFOX/AVASTIN. We recommended FOLFIRI second line therapy. Cycle # 1 FOLFIRI was on 08/02/2016. CEA 40.8 on 08/02/2016. Xgeva q4 weeks started on 08/02/2016. Cycle # 2 FOLFIRI was on 08/16/2016. CEA 31.7 on 08/16/2016. Cycle # 3 FOLFIRI (added Avastin) was on 08/30/2016 given that ulcers healed on EGD 08/15/2016 by Dr. [address]; Protonix bid since avastin re-started. Colonoscopy on 08/29/2016 (to look at the cecal area) unremarkable.  CEA 26.7 on 08/30/2016. Cycle # 4 FOLFIRI/Avastin was on 09/13/2016. CEA 23.4 on 09/13/2016. Cycle # 5 FOLFIRI/Avastin was on 09/27/2016. CEA 22.3 on 09/27/2016. Cycle # 6 FOLFIRI/Avastin was on 10/11/2016. CEA 18.4 on 10/11/2016.      Bone scan 10/21/2016 stable bone metastasis; ? New lesion anterior left sixth rib likely interval healing fracture. CT abdomen/pelvis revealed stable hepatic metastasis. CT chest negative for metastatic disease. Continue FOLFIRI/Avastin and repeat scans in 3 months. Cycle # 7 FOLFIRI/Avastin was on 10/25/2016. CEA 16.1  Cycle # 8 FOLFIRI/Avastin was on 11/08/2016. CEA 15.7  Cycle # 9 FOLFIRI/Avastin was on 11/29/2016. CEA 13.6 on 11/29/2016. Cycle # 10 FOLFIRI/Avastin was on 12/13/2016. CEA 10.6 on 12/13/2016. Cycle # 11 FOLFIRI/Avastin was on 12/27/2016. CEA 11.1 on 12/27/2016. Cycle # 12 FOLFIRI/Avastin was on 01/10/2017. CEA 9.7 on 01/10/2017.       CT chest 01/23/2017 No new pulmonary nodule or lymphadenopathy. Patchy groundglass opacities within the left lower lobe, suggestive of infectious or inflammatory etiology. Followup CT chest in 3 months. Slight increased linear sclerosis along the left anterior sixth rib corresponding to an area of increased uptake on the prior bone scan from 10/21/2016, favored to be related to interval healing changes of a nondisplaced fracture. CT abdomen/pelvis on 01/23/2017 Redemonstration of multiple hepatic metastases, which are similar compared to the prior CT from 10/21/2016. Redemonstration of area of increased sclerosis along the right acetabulum suspicious for metastatic disease. Bone scan on 01/23/2017 Stable subtle uptake within the left proximal diaphysis, again suggestive of metastatic disease  Decreased subtle uptake within the medial right acetabulum. Decreased uptake within the left anterior sixth rib corresponding to linear sclerosis on the recent CT, favored to be related to an joint rib fracture.     Continue FOLFIRI + Avastin Avastin was on 07/24/2018. Cycle # 52 FOLFIRI + Avastin was on 08/14/2018. Cycle # 53 FOLFIRI + Avastin was on 08/28/2018. CT chest 09/06/2018 noted interval decrease in size of LUCY measuring up to 4 mm, suggestive of treatment response. No mediastinal or hilar LN  CT abdomen/pelvis 09/06/2018 Slight interval increase in size of a previously noted metastatic lesion within the right hepatic lobe. This may be related to differences in phase of enhancement compared to the most recent study from 5/31/2018, the lesion remains decreased in size compared to the more previous studies. No abdominal, retroperitoneal, or pelvic lymphadenopathy. Continue FOLFIRI + Avastin and repeat scans in 2 months. Cycle # 54 FOLFIRI + Avastin was on 09/11/2018. Cycle # 55 FOLFIRI + Avastin was on 09/25/2018. Cycle # 56 FOLFIRI + Avastin was on 10/09/2018. Cycle # 57 FOLFIRI + Avastin was on 10/23/2018. CT chest 11/02/2018 stable 3 mm nodule within LUCY. No new right and left lung nodule. No mediastinal or osseous lesion. CT abdomen/pelvis 11/02/2018 stable metastatic disease to liver. No new metastatic disease identified. No mesenteric or retroperitoneal LN. No gross colonic lesion identified. Continue FOLFIRI + Avastin and repeat scans in 3 months. Cycle # 58 FOLFIRI + Avastin was on 11/06/2018. CEA 7.6 on 11/05/2018. Cycle # 59 FOLFIRI + Avastin was on 11/27/2018. CEA 6.9 on 11/26/2018. Cycle # 60 FOLFIRI + Avastin was on 12/11/2018. CEA 6.7 on 12/11/2018. Cycle # 61 FOLFIRI + Avastin was on 01/08/2019. CEA 5.6 on 01/07/2019. Cycle # 62 FOLFIRI + Avastin was on 01/29/2019. CEA 4.6 on 01/28/2019. Cycle # 63 FOLFIRI + Avastin was on 02/12/2019. CEA 4.3 on 02/11/2019. CT chest 02/21/2019 no evidence of metastatic disease. CT abdomen/pelvis 02/21/2019 stable hypodense liver lesions. No evidence of worsening metastatic disease. 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  Ordered EOMI. No oral lesions. NECK: Supple. Without lymphadenopathy. LUNGS: Lungs are CTA bilaterally, with no wheezing, crackles or rhonchi. CARDIOVASCULAR: Regular rhythm. No murmurs, rubs or gallops. ABDOMEN: Soft. Non-tender, non-distended. Positive bowel sounds. EXTREMITIES: Without clubbing, cyanosis, or edema. NEUROLOGIC: No focal deficits. 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 KRAS Mutation: Mutation detected. BRAF Mutation: Mutation not detected. 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 CT scan abdomen/pelvis on 10/26/2015 revealed small nodules at the lung bases, extensive liver lesions consistent with metastatic rectosigmoid cancer. CEA 3488 on 10/30/2015. Bone scan on 11/10/2015 noted no metastatic disease. CT chest on 11/10/2015 revealed Multiple pulmonary nodules in the upper and lower lobes consistent with metastatic disease; Hepatic metastasis also visualized. 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 Mediport was placed. Cycle # 1 of FOLFOX + Avastin was on 11/23/2015. CEA was 4555 on 11/23/2015. Cycle # 2 of FOLFOX + Avastin was on 12/08/2015. CEA was 3160 on 12/08/2015. Cycle # 3 of FOLFOX + Avastin was on 12/22/2015. CEA was 2516 on 12/21/2015. Cycle # 4 of FOLFOX + Avastin was on 01/05/2016. CEA was 2098 on 01/05/2015. Cycle # 5 of FOLFOX + Avastin was on 01/19/2016. CEA was 1511 on 01/05/2015.     -Bone scan on 01/26/2016 noted no metastatic disease. CT chest on 01/26/2016 revealed Significant response to treatment with no visible residual nodules. CT scan abdomen/pelvis on 01/26/2016 noted Interval decreased size of multiple masses in the liver compatible with treatment response. Continue another 2 months of FOLFOX + Avastin and repeat scans. Cycle # 6 of FOLFOX + Avastin was on 02/02/2016.  on 02/02/2016.   Cycle # 7 of FOLFOX + Avastin was on 02/16/2016.  on 02/16/2016. Cycle # 8 of FOLFOX + Avastin was on 03/01/2016.  on 03/01/2016. Cycle # 9 of FOLFOX + Avastin was on 03/15/2016.  on 03/15/2016. Cycle # 10 of FOLFOX + Avastin was on 03/29/2016. .4 on 03/29/2016. Cycle # 11 of FOLFOX + Avastin was on 04/12/2016. .4 on 04/12/2016.     Re-staging scans on 04/19/2016: CT Chest: clear lungs; no evidence of recurring pulmonary nodule; CT Abdomen/Pelvis: Further interval decrease in size of the multiple metastatic hepatic lesions, the largest lesion now measures 3.9 x 3.5 cm and previously measured 4.5 cm in maximum diameter. No mesenteric lymphadenopathy is identified; Bone Scan: No evidence of osseous metastasis. Continue same regimen and re-stage in 2-3 months. Cycle # 12 FOLFOX + Avastin was on 04/26/2016. .5 on 04/26/2016. Cycle # 13 FOLFOX + Avastin was on 05/10/2016. .3 on 05/10/2016. Cycle # 14 FOLFOX+AVASTIN was on 05/24/2016. .5 on 05/24/2016. Cycle # 15 FOLFOX + Avastin was on 06/07/2016. CEA 90.5 on 06/07/2016. Admitted to [facility] 06/13/2016-06/16/2016 for abdominal pain: EGD noted 1.5 cm clean based duodenal bulb ulceration s/p epinephrine and bicap per Dr. Gonzalez Rm. No active bleeding. A. Stomach, biopsy: Mild chronic gastritis, immunostain negative for Helicobacter  B. Esophagus, biopsy: Gastric glandular mucosa with prominent ntestinal metaplasia (Mad[place]'s epithelium), negative for epithelial dysplasia, esophageal squamous mucosa not identified     Cycle # 16 FOLFOX (discontinued avastin (bevacizumab) given association of peptic ulcer disease and known association of GI perforation.) was on 06/21/2016. CEA 47 on 06/21/2016. Cycle # 17 FOLFOX was on 07/05/2016. CEA 52.6 on 07/05/2016. CEA 47.6 on 07/19/2016.     Re-staging scans 07/19/2106: CT Chest negative for metastatic disease.    CT Abdomen/Pelvis: Stable hepatic lesions; Question new mural thickening in the cecum. Bone Scan: New Let hip lesion suspicious for bone metastasis.     Increased CEA; new mural thickening in the cecum and new left hip lesion suspicious for bone metastasis are consistent with disease progression; He derived maximum benefit from FOLFOX/AVASTIN. D/C FOLFOX/AVASTIN. We recommended FOLFIRI second line therapy. Cycle # 1 FOLFIRI was on 08/02/2016. CEA 40.8 on 08/02/2016. Xgeva q4 weeks started on 08/02/2016. Cycle # 2 FOLFIRI was on 08/16/2016. CEA 31.7 on 08/16/2016. Cycle # 3 FOLFIRI (added Avastin) was on 08/30/2016 given that ulcers healed on EGD 08/15/2016 by Dr. Pablo Galindo; Protonix bid since avastin re-started. Colonoscopy on 08/29/2016 (to look at the cecal area) unremarkable. CEA 26.7 on 08/30/2016. Cycle # 4 FOLFIRI/Avastin was on 09/13/2016. CEA 23.4 on 09/13/2016. Cycle # 5 FOLFIRI/Avastin was on 09/27/2016. CEA 22.3 on 09/27/2016. Cycle # 6 FOLFIRI/Avastin was on 10/11/2016. CEA 18.4 on 10/11/2016.      Bone scan 10/21/2016 stable bone metastasis; ? New lesion anterior left sixth rib likely interval healing fracture. CT abdomen/pelvis revealed stable hepatic metastasis. CT chest negative for metastatic disease. Continue FOLFIRI/Avastin and repeat scans in 3 months. Cycle # 7 FOLFIRI/Avastin was on 10/25/2016. CEA 16.1 on 10/25/2016. Cycle # 8 FOLFIRI/Avastin was on 11/08/2016. CEA 15.7 on 11/08/2016. Cycle # 9 FOLFIRI/Avastin was on 11/29/2016. CEA 13.6 on 11/29/2016. Cycle # 10 FOLFIRI/Avastin was on 12/13/2016. CEA 10.6 on 12/13/2016. Cycle # 11 FOLFIRI/Avastin was on 12/27/2016. CEA 11.1 on 12/27/2016. Cycle # 12 FOLFIRI/Avastin was on 01/10/2017. CEA 9.7 on 01/10/2017. CT chest 01/23/2017 No new pulmonary nodule or lymphadenopathy. Patchy groundglass opacities within the left lower lobe, suggestive of infectious or inflammatory etiology. Followup CT chest in 3 months.  Slight increased linear sclerosis along the left anterior sixth rib corresponding FOLFIRI + Avastin was on 05/30/2017. CEA 8.2 on 05/30/2017. Cycle # 23 FOLFIRI + Avastin was on 06/13/2017. CEA 7.7 on 06/13/2017. Cycle # 24 FOLFIRI + Avastin was on 06/27/2017. CEA 6.6 on 06/27/2017. Re-staging scans 07/05/2017:  Bone scan stable proximal left femoral diaphysis, right acetabulum, and left anterior sixth rib lesions;  CT abdomen/pelvis stable hypodense metastatic lesions within the liver; CT chest without convincing evidence of metastatic disease. Cycle # 25 FOLFIRI + Avastin was on 07/11/2017. CEA 6.3 on 07/11/2017. Cycle # 26 FOLFIRI + Avastin was on 07/25/2017. CEA 7.3 on 07/25/2017. Cycle # 27 FOLFIRI + Avastin was on 08/08/2017. CEA 6.5 on 08/08/2017. Cycle # 28 FOLFIRI + Avastin was on 08/22/2017. CEA 5.9 on 08/22/2017. Cycle # 29 FOLFIRI + Avastin was on 09/05/2017. CEA 6.3 on 09/05/2017. Cycle # 30 FOLFIRI + Avastin was on 09/19/2017. CEA 6.3 on 09/19/2017. Bone scan 09/26/2017 stable. CT abdomen/pelvis on 09/26/2017 Stable hypodense mass in the liver. Stable sclerotic lesion in the acetabulum. CT chest 09/26/2017 negative for metastatic disease. Cycle # 31 FOLFIRI + Avastin was on 10/03/2017. CEA 7.4 on 10/03/2017. Cycle # 32 FOLFIRI + Avastin was on 10/17/2017. CEA 6.0 on 10/17/2017. Cycle # 33 FOLFIRI + Avastin was on 10/31/2017. CEA 6.8 on 10/31/2017. Cycle # 34 FOLFIRI + Avastin was on 11/14/2017. CEA 7.2 on 11/14/2017. Cycle # 35 FOLFIRI + Avastin was on 11/28/2017. CEA 5.1 on 11/28/2017. Cycle # 36 FOLFIRI + Avastin was on 12/12/2017. CEA 6.1 on 12/12/2017. Bone scan 12/22/2017 noted no evidence of metastatic disease to the axial or appendicular skeleton. CT chest 12/22/2017 noted no evidence of metastatic disease. CT abdomen/pelvis 12/22/2017 noted stable hypodense lesions in the liver. Continue FOLFIRI + Avastin and repeat scans in 3 months. Cycle # 37 FOLFIRI + Avastin was on 12/27/2018. CEA 6.7 on 12/27/2017.   Cycle # 38 FOLFIRI + Avastin was on 01/09/2018. CEA 5.0 on 01/09/2018. Cycle # 39 FOLFIRI + Avastin was on 01/23/2018. CEA 6.5 on 01/23/2018. Cycle # 40 FOLFIRI + Avastin was on 02/06/2018. CEA 6.9 on 02/06/2018. Cycle # 41 FOLFIRI + Avastin was on 02/20/2018. CEA 6.4 on 02/20/2018. Cycle # 42 FOLFIRI + Avastin was on 03/06/2018. CEA 6.6 on 03/06/2018. Cycle # 43 FOLFIRI + Avastin was on 03/20/2018. CEA 5.7 on 03/20/2018. Bone scan on 03/26/2018 negative for metastatic disease. CT chest 03/26/2018 noted ? new 7 mm nodule in LUCY. CT abdomen/pelvis 03/26/2018 noted stable hypodense lesions in the liver. ? New small ascites in the abdomen. Patient wants to continue to monitor the lung finding and repeat scans in 2 months instead of changing the current chemotherapy regimen to oral Lonsurf. Cycle # 44 FOLFIRI + Avastin was on 04/03/2018. CEA 6.3 on 04/03/2018. Cycle # 45 FOLFIRI + Avastin was on 04/24/2018. CEA 5.3 on 04/24/2018. Cycle # 46 FOLFIRI + Avastin was on 05/08/2018. CEA 5.4 on 05/08/2018. Cycle # 47 FOLFIRI + Avastin was on 05/22/2018. CEA 6.1 on 05/22/2018. CT chest 05/31/2018 noted stable nodule in LUCY. No evidence of worsening malignancy. CT abdomen/pelvis on 05/31/2018 noted stable liver metastasis. No evidence of worsening malignancy. Continue FOLFIRI + Avastin and repeat scans in 3 months. Cycle # 48 FOLFIRI + Avastin was on 06/06/2018. CEA 6.3 on 06/05/2018. Cycle # 49 FOLFIRI + Avastin was on 06/19/2018. CEA 6.1 on 06/19/2018. Cycle # 50 FOLFIRI + Avastin was on 07/03/2018. CEA 7.4 on 07/03/2018. Cycle # 51 FOLFIRI + Avastin was on 07/24/2018. CEA 6.7 on 07/24/2018. Cycle # 52 FOLFIRI + Avastin was on 08/14/2018. CEA 6.8 on 08/14/2018. Cycle # 53 FOLFIRI + Avastin was on 08/28/2018. CEA 6.5 on 08/27/2018. CT chest 09/06/2018 noted interval decrease in size of LUCY measuring up to 4 mm, suggestive of treatment response.  No mediastinal or hilar LN  CT abdomen/pelvis 09/06/2018 Slight interval increase in size of a previously noted metastatic lesion within the right hepatic lobe. This may be related to differences in phase of enhancement compared to the most recent study from 5/31/2018, the lesion remains decreased in size compared to the more previous studies. No abdominal, retroperitoneal, or pelvic lymphadenopathy. Continue FOLFIRI + Avastin and repeat scans in 2 months. Cycle # 54 FOLFIRI + Avastin was on 09/11/2018. CEA 6.4 on 09/10/2018. Cycle # 55 FOLFIRI + Avastin was on 09/25/2018. CEA 6.4 on 09/25/2018. Cycle # 56 FOLFIRI + Avastin was on 10/09/2018. CEA 6.7 on 10/08/2018. Cycle # 57 FOLFIRI + Avastin was on 10/23/2018. CEA 7.2 on 10/22/2018. CT chest 11/02/2018 stable 3 mm nodule within LUCY. No new right and left lung nodule. No mediastinal or osseous lesion. CT abdomen/pelvis 11/02/2018 stable metastatic disease to liver. No new metastatic disease identified. No mesenteric or retroperitoneal LN. No gross colonic lesion identified. Continue FOLFIRI + Avastin and repeat scans in 3 months. Cycle # 58 FOLFIRI + Avastin was on 11/06/2018. CEA 7.6 on 11/05/2018. Cycle # 59 FOLFIRI + Avastin was on 11/27/2018. CEA 6.9 on 11/26/2018. Cycle # 60 FOLFIRI + Avastin was on 12/11/2018. CEA 6.7 on 12/11/2018. Cycle # 61 FOLFIRI + Avastin was on 01/08/2019. CEA 5.6 on 01/07/2019. Cycle # 62 FOLFIRI + Avastin was on 01/29/2019. CEA 4.6 on 01/28/2019. Cycle # 63 FOLFIRI + Avastin was on 02/12/2019. CEA 4.3 on 02/11/2019. CT chest 02/21/2019 no evidence of metastatic disease. CT abdomen/pelvis 02/21/2019 stable hypodense liver lesions. No evidence of worsening metastatic disease. 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 Ordered MRI thoracic spine and referred to neurosurgery team.  Cycle # 64 FOLFIRI + Avastin was on 02/26/2019. CEA 4.7 on 02/25/2019. Cycle # 65 FOLFIRI + Avastin was on 03/12/2019. CEA 4.0 on 03/11/2019.   Cycle # 66 FOLFIRI + Avastin was on 03/26/2019. CEA 4.7 on 03/25/2019. Cycle # 67 FOLFIRI + Avastin was on 04/09/2019. CEA 5.6 on 04/08/2019. Cycle # 68 FOLFIRI + Avastin was on 04/30/2019. CEA 4.9 on 04/29/2019. Cycle # 69 FOLFIRI + Avastin was on 05/14/2019. CEA 5.3 on 05/14/2019. CT chest 05/23/2019 stable small lung nodule with no evidence of metastatic disease. CT abdomen/pelvis 05/23/2019 Decrease conspicuity of the liver lesions. No new lesions seen. Stable splenomegaly. Continue FOLFIRI + Avastin and repeat scans in 3 months. Cycle # 70 FOLFIRI + Avastin was on 06/04/2019. CEA 4.8 on 06/03/2019. Cycle # 71 FOLFIRI + Avastin is today 6/18/2019. CEA 4.6 on 06/17/2019.     Return to clinic in 2 weeks Cycle # 72 FOLFIRI + Avastin  MSI testing noted no mismatch repair protein loss of expression    6/18/2019  Lynn Serrato MD  Board Certified Medical Oncologist

## 2019-06-20 ENCOUNTER — HOSPITAL ENCOUNTER (OUTPATIENT)
Dept: INFUSION THERAPY | Age: 69
Discharge: HOME | End: 2019-06-20
Payer: MEDICARE

## 2019-06-20 DIAGNOSIS — C78.7 RECTAL CANCER METASTASIZED TO LIVER (HCC): Primary | ICD-10-CM

## 2019-06-20 DIAGNOSIS — C20 RECTAL CANCER METASTASIZED TO LIVER (HCC): Primary | ICD-10-CM

## 2019-06-20 PROCEDURE — 6360000002 HC RX W HCPCS: Performed by: INTERNAL MEDICINE

## 2019-06-20 PROCEDURE — 96372 THER/PROPH/DIAG INJ SC/IM: CPT

## 2019-06-20 RX ADMIN — PEGFILGRASTIM 6 MG: 6 INJECTION SUBCUTANEOUS at 14:55

## 2019-07-08 ENCOUNTER — HOSPITAL ENCOUNTER (OUTPATIENT)
Age: 69
Discharge: HOME | End: 2019-07-08
Payer: MEDICARE

## 2019-07-08 DIAGNOSIS — C20 RECTAL CANCER METASTASIZED TO LIVER (HCC): ICD-10-CM

## 2019-07-08 DIAGNOSIS — C79.51 BONE METASTASIS (HCC): ICD-10-CM

## 2019-07-08 DIAGNOSIS — C78.7 RECTAL CANCER METASTASIZED TO LIVER (HCC): ICD-10-CM

## 2019-07-08 LAB
ALBUMIN SERPL-MCNC: 3.8 G/DL (ref 3.5–5.2)
ALP BLD-CCNC: 166 U/L (ref 40–129)
ALT SERPL-CCNC: 18 U/L (ref 0–40)
ANION GAP SERPL CALCULATED.3IONS-SCNC: 10 MMOL/L (ref 7–16)
ANISOCYTOSIS: ABNORMAL
AST SERPL-CCNC: 30 U/L (ref 0–39)
BASOPHILS ABSOLUTE: 0.04 E9/L (ref 0–0.2)
BASOPHILS RELATIVE PERCENT: 0.9 % (ref 0–2)
BILIRUB SERPL-MCNC: 0.6 MG/DL (ref 0–1.2)
BUN BLDV-MCNC: 19 MG/DL (ref 8–23)
CALCIUM SERPL-MCNC: 9.6 MG/DL (ref 8.6–10.2)
CEA: 4.8 NG/ML (ref 0–5.2)
CHLORIDE BLD-SCNC: 110 MMOL/L (ref 98–107)
CO2: 26 MMOL/L (ref 22–29)
CREAT SERPL-MCNC: 1.1 MG/DL (ref 0.7–1.2)
EOSINOPHILS ABSOLUTE: 0.29 E9/L (ref 0.05–0.5)
EOSINOPHILS RELATIVE PERCENT: 7 % (ref 0–6)
GFR AFRICAN AMERICAN: >60
GFR NON-AFRICAN AMERICAN: >60 ML/MIN/1.73
GLUCOSE BLD-MCNC: 118 MG/DL (ref 74–99)
HCT VFR BLD CALC: 29.5 % (ref 37–54)
HEMOGLOBIN: 9 G/DL (ref 12.5–16.5)
LYMPHOCYTES ABSOLUTE: 0.46 E9/L (ref 1.5–4)
LYMPHOCYTES RELATIVE PERCENT: 11.3 % (ref 20–42)
MCH RBC QN AUTO: 31.9 PG (ref 26–35)
MCHC RBC AUTO-ENTMCNC: 30.5 % (ref 32–34.5)
MCV RBC AUTO: 104.6 FL (ref 80–99.9)
MONOCYTES ABSOLUTE: 0.25 E9/L (ref 0.1–0.95)
MONOCYTES RELATIVE PERCENT: 6.1 % (ref 2–12)
MYELOCYTE PERCENT: 0.9 % (ref 0–0)
NEUTROPHILS ABSOLUTE: 3.15 E9/L (ref 1.8–7.3)
NEUTROPHILS RELATIVE PERCENT: 73.9 % (ref 43–80)
OVALOCYTES: ABNORMAL
PDW BLD-RTO: 17.8 FL (ref 11.5–15)
PLATELET # BLD: 117 E9/L (ref 130–450)
PMV BLD AUTO: 11.1 FL (ref 7–12)
POIKILOCYTES: ABNORMAL
POTASSIUM SERPL-SCNC: 4.3 MMOL/L (ref 3.5–5)
RBC # BLD: 2.82 E12/L (ref 3.8–5.8)
SODIUM BLD-SCNC: 146 MMOL/L (ref 132–146)
TEAR DROP CELLS: ABNORMAL
TOTAL PROTEIN: 6.6 G/DL (ref 6.4–8.3)
WBC # BLD: 4.2 E9/L (ref 4.5–11.5)

## 2019-07-08 PROCEDURE — 85025 COMPLETE CBC W/AUTO DIFF WBC: CPT

## 2019-07-08 PROCEDURE — 36415 COLL VENOUS BLD VENIPUNCTURE: CPT

## 2019-07-08 PROCEDURE — 80053 COMPREHEN METABOLIC PANEL: CPT

## 2019-07-08 PROCEDURE — 82378 CARCINOEMBRYONIC ANTIGEN: CPT

## 2019-07-09 ENCOUNTER — OFFICE VISIT (OUTPATIENT)
Dept: ONCOLOGY | Age: 69
End: 2019-07-09
Payer: MEDICARE

## 2019-07-09 ENCOUNTER — HOSPITAL ENCOUNTER (OUTPATIENT)
Dept: INFUSION THERAPY | Age: 69
Discharge: HOME | End: 2019-07-09
Payer: MEDICARE

## 2019-07-09 VITALS
SYSTOLIC BLOOD PRESSURE: 122 MMHG | WEIGHT: 200.7 LBS | BODY MASS INDEX: 26.6 KG/M2 | HEART RATE: 64 BPM | TEMPERATURE: 97.5 F | DIASTOLIC BLOOD PRESSURE: 70 MMHG | HEIGHT: 73 IN

## 2019-07-09 VITALS — SYSTOLIC BLOOD PRESSURE: 136 MMHG | HEART RATE: 58 BPM | DIASTOLIC BLOOD PRESSURE: 72 MMHG | RESPIRATION RATE: 18 BRPM

## 2019-07-09 DIAGNOSIS — C79.51 BONE METASTASIS (HCC): ICD-10-CM

## 2019-07-09 DIAGNOSIS — C78.7 RECTAL CANCER METASTASIZED TO LIVER (HCC): Primary | ICD-10-CM

## 2019-07-09 DIAGNOSIS — Z09 FOLLOW UP: Primary | ICD-10-CM

## 2019-07-09 DIAGNOSIS — C20 RECTAL CANCER METASTASIZED TO LIVER (HCC): Primary | ICD-10-CM

## 2019-07-09 PROCEDURE — 96411 CHEMO IV PUSH ADDL DRUG: CPT

## 2019-07-09 PROCEDURE — 96417 CHEMO IV INFUS EACH ADDL SEQ: CPT

## 2019-07-09 PROCEDURE — 96375 TX/PRO/DX INJ NEW DRUG ADDON: CPT

## 2019-07-09 PROCEDURE — 99214 OFFICE O/P EST MOD 30 MIN: CPT | Performed by: INTERNAL MEDICINE

## 2019-07-09 PROCEDURE — 2580000003 HC RX 258: Performed by: INTERNAL MEDICINE

## 2019-07-09 PROCEDURE — 96368 THER/DIAG CONCURRENT INF: CPT

## 2019-07-09 PROCEDURE — 96413 CHEMO IV INFUSION 1 HR: CPT

## 2019-07-09 PROCEDURE — 96415 CHEMO IV INFUSION ADDL HR: CPT

## 2019-07-09 PROCEDURE — 96372 THER/PROPH/DIAG INJ SC/IM: CPT

## 2019-07-09 PROCEDURE — 6360000002 HC RX W HCPCS: Performed by: INTERNAL MEDICINE

## 2019-07-09 RX ORDER — HEPARIN SODIUM (PORCINE) LOCK FLUSH IV SOLN 100 UNIT/ML 100 UNIT/ML
500 SOLUTION INTRAVENOUS PRN
Status: CANCELLED | OUTPATIENT
Start: 2019-07-09

## 2019-07-09 RX ORDER — SODIUM CHLORIDE 9 MG/ML
250 INJECTION, SOLUTION INTRAVENOUS CONTINUOUS
Status: CANCELLED | OUTPATIENT
Start: 2019-07-09

## 2019-07-09 RX ORDER — ATROPINE SULFATE 0.4 MG/ML
0.4 AMPUL (ML) INJECTION ONCE
Status: CANCELLED | OUTPATIENT
Start: 2019-07-09

## 2019-07-09 RX ORDER — SODIUM CHLORIDE 0.9 % (FLUSH) 0.9 %
10 SYRINGE (ML) INJECTION PRN
Status: DISCONTINUED | OUTPATIENT
Start: 2019-07-09 | End: 2019-07-10 | Stop reason: HOSPADM

## 2019-07-09 RX ORDER — HEPARIN SODIUM (PORCINE) LOCK FLUSH IV SOLN 100 UNIT/ML 100 UNIT/ML
500 SOLUTION INTRAVENOUS PRN
Status: DISCONTINUED | OUTPATIENT
Start: 2019-07-09 | End: 2019-07-10 | Stop reason: HOSPADM

## 2019-07-09 RX ORDER — DIPHENHYDRAMINE HYDROCHLORIDE 50 MG/ML
50 INJECTION INTRAMUSCULAR; INTRAVENOUS ONCE
Status: CANCELLED | OUTPATIENT
Start: 2019-07-09

## 2019-07-09 RX ORDER — FLUOROURACIL 50 MG/ML
850 INJECTION, SOLUTION INTRAVENOUS ONCE
Status: CANCELLED | OUTPATIENT
Start: 2019-07-09

## 2019-07-09 RX ORDER — ATROPINE SULFATE 0.4 MG/ML
0.4 AMPUL (ML) INJECTION ONCE
Status: COMPLETED | OUTPATIENT
Start: 2019-07-09 | End: 2019-07-09

## 2019-07-09 RX ORDER — METHYLPREDNISOLONE SODIUM SUCCINATE 125 MG/2ML
125 INJECTION, POWDER, LYOPHILIZED, FOR SOLUTION INTRAMUSCULAR; INTRAVENOUS ONCE
Status: CANCELLED | OUTPATIENT
Start: 2019-07-09

## 2019-07-09 RX ORDER — EPINEPHRINE 1 MG/ML
0.3 INJECTION, SOLUTION, CONCENTRATE INTRAVENOUS PRN
Status: CANCELLED | OUTPATIENT
Start: 2019-07-09

## 2019-07-09 RX ORDER — PALONOSETRON HYDROCHLORIDE 0.05 MG/ML
0.25 INJECTION, SOLUTION INTRAVENOUS ONCE
Status: CANCELLED | OUTPATIENT
Start: 2019-07-09

## 2019-07-09 RX ORDER — SODIUM CHLORIDE 9 MG/ML
INJECTION, SOLUTION INTRAVENOUS CONTINUOUS
Status: CANCELLED | OUTPATIENT
Start: 2019-07-09

## 2019-07-09 RX ORDER — SODIUM CHLORIDE 9 MG/ML
250 INJECTION, SOLUTION INTRAVENOUS CONTINUOUS
Status: DISCONTINUED | OUTPATIENT
Start: 2019-07-09 | End: 2019-07-10 | Stop reason: HOSPADM

## 2019-07-09 RX ORDER — DEXAMETHASONE SODIUM PHOSPHATE 10 MG/ML
10 INJECTION, SOLUTION INTRAMUSCULAR; INTRAVENOUS ONCE
Status: CANCELLED | OUTPATIENT
Start: 2019-07-09

## 2019-07-09 RX ORDER — SODIUM CHLORIDE 0.9 % (FLUSH) 0.9 %
10 SYRINGE (ML) INJECTION PRN
Status: CANCELLED | OUTPATIENT
Start: 2019-07-09

## 2019-07-09 RX ORDER — FLUOROURACIL 50 MG/ML
850 INJECTION, SOLUTION INTRAVENOUS ONCE
Status: COMPLETED | OUTPATIENT
Start: 2019-07-09 | End: 2019-07-09

## 2019-07-09 RX ORDER — PALONOSETRON HYDROCHLORIDE 0.05 MG/ML
0.25 INJECTION, SOLUTION INTRAVENOUS ONCE
Status: COMPLETED | OUTPATIENT
Start: 2019-07-09 | End: 2019-07-09

## 2019-07-09 RX ORDER — DEXAMETHASONE SODIUM PHOSPHATE 10 MG/ML
10 INJECTION INTRAMUSCULAR; INTRAVENOUS ONCE
Status: COMPLETED | OUTPATIENT
Start: 2019-07-09 | End: 2019-07-09

## 2019-07-09 RX ORDER — 0.9 % SODIUM CHLORIDE 0.9 %
10 VIAL (ML) INJECTION ONCE
Status: CANCELLED | OUTPATIENT
Start: 2019-07-09

## 2019-07-09 RX ADMIN — FLUOROURACIL 850 MG: 50 INJECTION, SOLUTION INTRAVENOUS at 12:40

## 2019-07-09 RX ADMIN — Medication 500 UNITS: at 12:45

## 2019-07-09 RX ADMIN — LEUCOVORIN CALCIUM 850 MG: 10 INJECTION INTRAMUSCULAR; INTRAVENOUS at 10:44

## 2019-07-09 RX ADMIN — DEXAMETHASONE SODIUM PHOSPHATE 10 MG: 10 INJECTION INTRAMUSCULAR; INTRAVENOUS at 09:44

## 2019-07-09 RX ADMIN — SODIUM CHLORIDE 400 MG: 9 INJECTION, SOLUTION INTRAVENOUS at 10:45

## 2019-07-09 RX ADMIN — ATROPINE SULFATE 0.4 MG: 0.4 INJECTION, SOLUTION INTRAMUSCULAR; INTRAVENOUS; SUBCUTANEOUS at 09:35

## 2019-07-09 RX ADMIN — SODIUM CHLORIDE 250 ML: 9 INJECTION, SOLUTION INTRAVENOUS at 09:34

## 2019-07-09 RX ADMIN — BEVACIZUMAB 400 MG: 400 INJECTION, SOLUTION INTRAVENOUS at 09:58

## 2019-07-09 RX ADMIN — DENOSUMAB 120 MG: 120 INJECTION SUBCUTANEOUS at 10:46

## 2019-07-09 RX ADMIN — Medication 10 ML: at 12:45

## 2019-07-09 RX ADMIN — PALONOSETRON 0.25 MG: 0.25 INJECTION, SOLUTION INTRAVENOUS at 09:34

## 2019-07-09 NOTE — PROGRESS NOTES
3488. Bone scan on 11/10/2015 noted no metastatic disease. CT chest on 11/10/2015 revealed Multiple pulmonary nodules in the upper and lower lobes consistent with metastatic disease;   Hepatic metastasis also visualized. 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 Mediport was placed. Cycle # 1 of FOLFOX + Avastin was on 11/23/2015. CEA was 4555 on 11/23/2015. Cycle # 2 of FOLFOX + Avastin was on 12/08/2015. CEA was 3160 on 12/08/2015. Cycle # 3 of FOLFOX + Avastin was on 12/22/2015. CEA was 2516 on 12/21/2015. Cycle # 4 of FOLFOX + Avastin was on 01/05/2016. CEA was 2098 on 01/05/2015. Cycle # 5 of FOLFOX + Avastin was on 01/19/2016. CEA was 1511 on 01/05/2015.     -Bone scan on 01/26/2016 noted no metastatic disease. CT chest on 01/26/2016 revealed Significant response to treatment with no visible residual nodules. CT scan abdomen/pelvis on 01/26/2016 noted Interval decreased size of multiple masses in the liver compatible with treatment response. Continue another 2 months of FOLFOX + Avastin and repeat scans. Cycle # 6 of FOLFOX + Avastin was on 02/02/2016.  on 02/02/2016. Cycle # 7 of FOLFOX + Avastin was on 02/16/2016.  on 02/16/2016. Cycle # 8 of FOLFOX + Avastin was on 03/01/2016.  on 03/01/2016. Cycle # 9 of FOLFOX + Avastin was on 03/15/2016.  on 03/15/2016. Cycle # 10 of FOLFOX + Avastin was on 03/29/2016. .4 on 03/29/2016. Cycle # 11 of FOLFOX + Avastin was on 04/12/2016. .4 on 04/12/2016.     Re-staging scans on 04/19/2016: CT Chest: clear lungs; no evidence of recurring pulmonary nodule; CT Abdomen/Pelvis: Further interval decrease in size of the multiple metastatic hepatic lesions, the largest lesion now measures 3.9 x 3.5 cm and previously measured 4.5 cm in maximum diameter. No mesenteric lymphadenopathy is identified; Bone Scan: No evidence of osseous metastasis.   Continue same regimen and 10/31/2017. Cycle # 34 FOLFIRI + Avastin was on 11/14/2017. CEA 7.2 on 11/14/2017. Cycle # 35 FOLFIRI + Avastin was on 11/28/2017. CEA 5.1 on 11/28/2017. Cycle # 36 FOLFIRI + Avastin was on 12/12/2017. CEA 6.1 on 12/12/2017. Bone scan 12/22/2017 noted no evidence of metastatic disease to the axial or appendicular skeleton. CT chest 12/22/2017 noted no evidence of metastatic disease. CT abdomen/pelvis 12/22/2017 noted stable hypodense lesions in the liver. Continue FOLFIRI + Avastin and repeat scans in 3 months. Cycle # 37 FOLFIRI + Avastin was on 12/27/2018. Cycle # 38 FOLFIRI + Avastin was on 01/09/2018. Cycle # 39 FOLFIRI + Avastin was on 01/23/2018. Cycle # 40 FOLFIRI + Avastin was on 02/06/2018. Cycle # 41 FOLFIRI + Avastin was on 02/20/2018. Cycle # 42 FOLFIRI + Avastin was on 03/06/2018. Cycle # 43 FOLFIRI + Avastin was on 03/20/2018. Bone scan on 03/26/2018 negative for metastatic disease. CT chest 03/26/2018 noted ? new 7 mm nodule in LUCY. CT abdomen/pelvis 03/26/2018 noted stable hypodense lesions in the liver. ? New small ascites in the abdomen. Patient wants to continue to monitor the lung finding and repeat scans in 2 months instead of changing the current regimen into oral Lonsurf. Cycle # 44 FOLFIRI + Avastin was on 04/03/2018. CEA 6.3 on 04/03/2018. Cycle # 45 FOLFIRI + Avastin was on 04/24/2018. CEA 5.3 on 04/24/2018. Cycle # 46 FOLFIRI + Avastin was on 05/08/2018. CEA 5.4 on 05/08/2018. Cycle # 47 FOLFIRI + Avastin was on 05/22/2018. CEA 6.1 on 05/22/2018. CT chest 05/31/2018 noted stable nodule in LUCY. No evidence of worsening malignancy. CT abdomen/pelvis on 05/31/2018 noted stable liver metastasis. No evidence of worsening malignancy. Continue FOLFIRI + Avastin and repeat scans in 3 months. Cycle # 48 FOLFIRI + Avastin was on 06/06/2018. Cycle # 49 FOLFIRI + Avastin was on 06/19/2018. Cycle # 50 FOLFIRI + Avastin was on 07/03/2018.   Cycle # 51 FOLFIRI + Avastin was on 02/02/2016.  on 02/02/2016. Cycle # 7 of FOLFOX + Avastin was on 02/16/2016.  on 02/16/2016. Cycle # 8 of FOLFOX + Avastin was on 03/01/2016.  on 03/01/2016. Cycle # 9 of FOLFOX + Avastin was on 03/15/2016.  on 03/15/2016. Cycle # 10 of FOLFOX + Avastin was on 03/29/2016. .4 on 03/29/2016. Cycle # 11 of FOLFOX + Avastin was on 04/12/2016. .4 on 04/12/2016.     Re-staging scans on 04/19/2016: CT Chest: clear lungs; no evidence of recurring pulmonary nodule; CT Abdomen/Pelvis: Further interval decrease in size of the multiple metastatic hepatic lesions, the largest lesion now measures 3.9 x 3.5 cm and previously measured 4.5 cm in maximum diameter. No mesenteric lymphadenopathy is identified; Bone Scan: No evidence of osseous metastasis. Continue same regimen and re-stage in 2-3 months. Cycle # 12 FOLFOX + Avastin was on 04/26/2016. .5 on 04/26/2016. Cycle # 13 FOLFOX + Avastin was on 05/10/2016. .3 on 05/10/2016. Cycle # 14 FOLFOX+AVASTIN was on 05/24/2016. .5 on 05/24/2016. Cycle # 15 FOLFOX + Avastin was on 06/07/2016. CEA 90.5 on 06/07/2016. Admitted to [facility] 06/13/2016-06/16/2016 for abdominal pain: EGD noted 1.5 cm clean based duodenal bulb ulceration s/p epinephrine and bicap per Dr. Anisha Mera. No active bleeding. A. Stomach, biopsy: Mild chronic gastritis, immunostain negative for Helicobacter  B. Esophagus, biopsy: Gastric glandular mucosa with prominent ntestinal metaplasia (Mad[place]'s epithelium), negative for epithelial dysplasia, esophageal squamous mucosa not identified     Cycle # 16 FOLFOX (discontinued avastin (bevacizumab) given association of peptic ulcer disease and known association of GI perforation.) was on 06/21/2016. CEA 47 on 06/21/2016. Cycle # 17 FOLFOX was on 07/05/2016. CEA 52.6 on 07/05/2016. CEA 47.6 on 07/19/2016.     Re-staging scans 07/19/2106: CT Chest negative for metastatic disease.    CT Abdomen/Pelvis: Stable hepatic lesions; Question new mural thickening in the cecum. Bone Scan: New Let hip lesion suspicious for bone metastasis.     Increased CEA; new mural thickening in the cecum and new left hip lesion suspicious for bone metastasis are consistent with disease progression; He derived maximum benefit from FOLFOX/AVASTIN. D/C FOLFOX/AVASTIN. We recommended FOLFIRI second line therapy. Cycle # 1 FOLFIRI was on 08/02/2016. CEA 40.8 on 08/02/2016. Xgeva q4 weeks started on 08/02/2016. Cycle # 2 FOLFIRI was on 08/16/2016. CEA 31.7 on 08/16/2016. Cycle # 3 FOLFIRI (added Avastin) was on 08/30/2016 given that ulcers healed on EGD 08/15/2016 by Dr. Cielo Donahue; Protonix bid since avastin re-started. Colonoscopy on 08/29/2016 (to look at the cecal area) unremarkable. CEA 26.7 on 08/30/2016. Cycle # 4 FOLFIRI/Avastin was on 09/13/2016. CEA 23.4 on 09/13/2016. Cycle # 5 FOLFIRI/Avastin was on 09/27/2016. CEA 22.3 on 09/27/2016. Cycle # 6 FOLFIRI/Avastin was on 10/11/2016. CEA 18.4 on 10/11/2016.      Bone scan 10/21/2016 stable bone metastasis; ? New lesion anterior left sixth rib likely interval healing fracture. CT abdomen/pelvis revealed stable hepatic metastasis. CT chest negative for metastatic disease. Continue FOLFIRI/Avastin and repeat scans in 3 months. Cycle # 7 FOLFIRI/Avastin was on 10/25/2016. CEA 16.1 on 10/25/2016. Cycle # 8 FOLFIRI/Avastin was on 11/08/2016. CEA 15.7 on 11/08/2016. Cycle # 9 FOLFIRI/Avastin was on 11/29/2016. CEA 13.6 on 11/29/2016. Cycle # 10 FOLFIRI/Avastin was on 12/13/2016. CEA 10.6 on 12/13/2016. Cycle # 11 FOLFIRI/Avastin was on 12/27/2016. CEA 11.1 on 12/27/2016. Cycle # 12 FOLFIRI/Avastin was on 01/10/2017. CEA 9.7 on 01/10/2017. CT chest 01/23/2017 No new pulmonary nodule or lymphadenopathy. Patchy groundglass opacities within the left lower lobe, suggestive of infectious or inflammatory etiology. Followup CT chest in 3 months.  Slight on 12/27/2018. CEA 6.7 on 12/27/2017. Cycle # 38 FOLFIRI + Avastin was on 01/09/2018. CEA 5.0 on 01/09/2018. Cycle # 39 FOLFIRI + Avastin was on 01/23/2018. CEA 6.5 on 01/23/2018. Cycle # 40 FOLFIRI + Avastin was on 02/06/2018. CEA 6.9 on 02/06/2018. Cycle # 41 FOLFIRI + Avastin was on 02/20/2018. CEA 6.4 on 02/20/2018. Cycle # 42 FOLFIRI + Avastin was on 03/06/2018. CEA 6.6 on 03/06/2018. Cycle # 43 FOLFIRI + Avastin was on 03/20/2018. CEA 5.7 on 03/20/2018. Bone scan on 03/26/2018 negative for metastatic disease. CT chest 03/26/2018 noted ? new 7 mm nodule in LUCY. CT abdomen/pelvis 03/26/2018 noted stable hypodense lesions in the liver. ? New small ascites in the abdomen. Patient wants to continue to monitor the lung finding and repeat scans in 2 months instead of changing the current chemotherapy regimen to oral Lonsurf. Cycle # 44 FOLFIRI + Avastin was on 04/03/2018. CEA 6.3 on 04/03/2018. Cycle # 45 FOLFIRI + Avastin was on 04/24/2018. CEA 5.3 on 04/24/2018. Cycle # 46 FOLFIRI + Avastin was on 05/08/2018. CEA 5.4 on 05/08/2018. Cycle # 47 FOLFIRI + Avastin was on 05/22/2018. CEA 6.1 on 05/22/2018. CT chest 05/31/2018 noted stable nodule in LUCY. No evidence of worsening malignancy. CT abdomen/pelvis on 05/31/2018 noted stable liver metastasis. No evidence of worsening malignancy. Continue FOLFIRI + Avastin and repeat scans in 3 months. Cycle # 48 FOLFIRI + Avastin was on 06/06/2018. CEA 6.3 on 06/05/2018. Cycle # 49 FOLFIRI + Avastin was on 06/19/2018. CEA 6.1 on 06/19/2018. Cycle # 50 FOLFIRI + Avastin was on 07/03/2018. CEA 7.4 on 07/03/2018. Cycle # 51 FOLFIRI + Avastin was on 07/24/2018. CEA 6.7 on 07/24/2018. Cycle # 52 FOLFIRI + Avastin was on 08/14/2018. CEA 6.8 on 08/14/2018. Cycle # 53 FOLFIRI + Avastin was on 08/28/2018. CEA 6.5 on 08/27/2018. CT chest 09/06/2018 noted interval decrease in size of LUCY measuring up to 4 mm, suggestive of treatment response.  No

## 2019-07-11 ENCOUNTER — HOSPITAL ENCOUNTER (OUTPATIENT)
Dept: INFUSION THERAPY | Age: 69
Discharge: HOME | End: 2019-07-11
Payer: MEDICARE

## 2019-07-11 DIAGNOSIS — C20 RECTAL CANCER METASTASIZED TO LIVER (HCC): Primary | ICD-10-CM

## 2019-07-11 DIAGNOSIS — C78.7 RECTAL CANCER METASTASIZED TO LIVER (HCC): Primary | ICD-10-CM

## 2019-07-11 PROCEDURE — 96372 THER/PROPH/DIAG INJ SC/IM: CPT

## 2019-07-11 PROCEDURE — 6360000002 HC RX W HCPCS: Performed by: INTERNAL MEDICINE

## 2019-07-11 RX ADMIN — PEGFILGRASTIM 6 MG: 6 INJECTION SUBCUTANEOUS at 14:57

## 2019-07-22 ENCOUNTER — HOSPITAL ENCOUNTER (OUTPATIENT)
Age: 69
Discharge: HOME | End: 2019-07-22
Payer: MEDICARE

## 2019-07-22 DIAGNOSIS — C20 RECTAL CANCER METASTASIZED TO LIVER (HCC): ICD-10-CM

## 2019-07-22 DIAGNOSIS — C79.51 BONE METASTASIS (HCC): ICD-10-CM

## 2019-07-22 DIAGNOSIS — C78.7 RECTAL CANCER METASTASIZED TO LIVER (HCC): ICD-10-CM

## 2019-07-22 LAB
ALBUMIN SERPL-MCNC: 3.7 G/DL (ref 3.5–5.2)
ALP BLD-CCNC: 146 U/L (ref 40–129)
ALT SERPL-CCNC: 15 U/L (ref 0–40)
ANION GAP SERPL CALCULATED.3IONS-SCNC: 10 MMOL/L (ref 7–16)
ANISOCYTOSIS: ABNORMAL
AST SERPL-CCNC: 23 U/L (ref 0–39)
BASOPHILS ABSOLUTE: 0 E9/L (ref 0–0.2)
BASOPHILS RELATIVE PERCENT: 1.1 % (ref 0–2)
BILIRUB SERPL-MCNC: 0.9 MG/DL (ref 0–1.2)
BUN BLDV-MCNC: 16 MG/DL (ref 8–23)
CALCIUM SERPL-MCNC: 9.3 MG/DL (ref 8.6–10.2)
CEA: 4.9 NG/ML (ref 0–5.2)
CHLORIDE BLD-SCNC: 106 MMOL/L (ref 98–107)
CO2: 25 MMOL/L (ref 22–29)
CREAT SERPL-MCNC: 1 MG/DL (ref 0.7–1.2)
EOSINOPHILS ABSOLUTE: 0.17 E9/L (ref 0.05–0.5)
EOSINOPHILS RELATIVE PERCENT: 6.1 % (ref 0–6)
GFR AFRICAN AMERICAN: >60
GFR NON-AFRICAN AMERICAN: >60 ML/MIN/1.73
GLUCOSE BLD-MCNC: 119 MG/DL (ref 74–99)
HCT VFR BLD CALC: 26.7 % (ref 37–54)
HEMOGLOBIN: 8.3 G/DL (ref 12.5–16.5)
LYMPHOCYTES ABSOLUTE: 0.34 E9/L (ref 1.5–4)
LYMPHOCYTES RELATIVE PERCENT: 12.2 % (ref 20–42)
MCH RBC QN AUTO: 31.7 PG (ref 26–35)
MCHC RBC AUTO-ENTMCNC: 31.1 % (ref 32–34.5)
MCV RBC AUTO: 101.9 FL (ref 80–99.9)
MONOCYTES ABSOLUTE: 0.08 E9/L (ref 0.1–0.95)
MONOCYTES RELATIVE PERCENT: 2.6 % (ref 2–12)
NEUTROPHILS ABSOLUTE: 2.21 E9/L (ref 1.8–7.3)
NEUTROPHILS RELATIVE PERCENT: 79.1 % (ref 43–80)
OVALOCYTES: ABNORMAL
PDW BLD-RTO: 17.2 FL (ref 11.5–15)
PLATELET # BLD: 83 E9/L (ref 130–450)
PLATELET CONFIRMATION: NORMAL
PMV BLD AUTO: 9.3 FL (ref 7–12)
POIKILOCYTES: ABNORMAL
POLYCHROMASIA: ABNORMAL
POTASSIUM SERPL-SCNC: 4 MMOL/L (ref 3.5–5)
RBC # BLD: 2.62 E12/L (ref 3.8–5.8)
SODIUM BLD-SCNC: 141 MMOL/L (ref 132–146)
TOTAL PROTEIN: 6.3 G/DL (ref 6.4–8.3)
WBC # BLD: 2.8 E9/L (ref 4.5–11.5)

## 2019-07-22 PROCEDURE — 82378 CARCINOEMBRYONIC ANTIGEN: CPT

## 2019-07-22 PROCEDURE — 80053 COMPREHEN METABOLIC PANEL: CPT

## 2019-07-22 PROCEDURE — 36415 COLL VENOUS BLD VENIPUNCTURE: CPT

## 2019-07-22 PROCEDURE — 85025 COMPLETE CBC W/AUTO DIFF WBC: CPT

## 2019-07-22 NOTE — PROGRESS NOTES
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 BRAF Mutation: Mutation not detected. 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 CT scan abdomen/pelvis on 10/26/2015 revealed small nodules at the lung bases, extensive liver lesions consistent with metastatic rectosigmoid cancer. CEA 3488 on 10/30/2015. Bone scan on 11/10/2015 noted no metastatic disease. CT chest on 11/10/2015 revealed Multiple pulmonary nodules in the upper and lower lobes consistent with metastatic disease; Hepatic metastasis also visualized. 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 Mediport was placed. Cycle # 1 of FOLFOX + Avastin was on 11/23/2015. CEA was 4555 on 11/23/2015. Cycle # 2 of FOLFOX + Avastin was on 12/08/2015. CEA was 3160 on 12/08/2015. Cycle # 3 of FOLFOX + Avastin was on 12/22/2015. CEA was 2516 on 12/21/2015. Cycle # 4 of FOLFOX + Avastin was on 01/05/2016. CEA was 2098 on 01/05/2015. Cycle # 5 of FOLFOX + Avastin was on 01/19/2016. CEA was 1511 on 01/05/2015.     -Bone scan on 01/26/2016 noted no metastatic disease. CT chest on 01/26/2016 revealed Significant response to treatment with no visible residual nodules. CT scan abdomen/pelvis on 01/26/2016 noted Interval decreased size of multiple masses in the liver compatible with treatment response. Continue another 2 months of FOLFOX + Avastin and repeat scans. Cycle # 6 of FOLFOX + Avastin was on 02/02/2016.  on 02/02/2016. Cycle # 7 of FOLFOX + Avastin was on 02/16/2016.  on 02/16/2016. Cycle # 8 of FOLFOX + Avastin was on 03/01/2016.  on 03/01/2016. Cycle # 9 of FOLFOX + Avastin was on 03/15/2016.   on from 5/31/2018, the lesion remains decreased in size compared to the more previous studies. No abdominal, retroperitoneal, or pelvic lymphadenopathy. Continue FOLFIRI + Avastin and repeat scans in 2 months. Cycle # 54 FOLFIRI + Avastin was on 09/11/2018. CEA 6.4 on 09/10/2018. Cycle # 55 FOLFIRI + Avastin was on 09/25/2018. CEA 6.4 on 09/25/2018. Cycle # 56 FOLFIRI + Avastin was on 10/09/2018. CEA 6.7 on 10/08/2018. Cycle # 57 FOLFIRI + Avastin was on 10/23/2018. CEA 7.2 on 10/22/2018. CT chest 11/02/2018 stable 3 mm nodule within LUCY. No new right and left lung nodule. No mediastinal or osseous lesion. CT abdomen/pelvis 11/02/2018 stable metastatic disease to liver. No new metastatic disease identified. No mesenteric or retroperitoneal LN. No gross colonic lesion identified. Continue FOLFIRI + Avastin and repeat scans in 3 months. Cycle # 58 FOLFIRI + Avastin was on 11/06/2018. CEA 7.6 on 11/05/2018. Cycle # 59 FOLFIRI + Avastin was on 11/27/2018. CEA 6.9 on 11/26/2018. Cycle # 60 FOLFIRI + Avastin was on 12/11/2018. CEA 6.7 on 12/11/2018. Cycle # 61 FOLFIRI + Avastin was on 01/08/2019. CEA 5.6 on 01/07/2019. Cycle # 62 FOLFIRI + Avastin was on 01/29/2019. CEA 4.6 on 01/28/2019. Cycle # 63 FOLFIRI + Avastin was on 02/12/2019. CEA 4.3 on 02/11/2019. CT chest 02/21/2019 no evidence of metastatic disease. CT abdomen/pelvis 02/21/2019 stable hypodense liver lesions. No evidence of worsening metastatic disease. 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 Ordered MRI thoracic spine and referred to neurosurgery team.  Cycle # 64 FOLFIRI + Avastin was on 02/26/2019. CEA 4.7 on 02/25/2019. Cycle # 65 FOLFIRI + Avastin was on 03/12/2019. CEA 4.0 on 03/11/2019. Cycle # 66 FOLFIRI + Avastin was on 03/26/2019. CEA 4.7 on 03/25/2019. Cycle # 67 FOLFIRI + Avastin was on 04/09/2019. CEA 5.6 on 04/08/2019. Cycle # 68 FOLFIRI + Avastin was on 04/30/2019.  CEA 4.9 on

## 2019-07-23 ENCOUNTER — HOSPITAL ENCOUNTER (OUTPATIENT)
Dept: INFUSION THERAPY | Age: 69
End: 2019-07-23
Payer: MEDICARE

## 2019-07-23 ENCOUNTER — OFFICE VISIT (OUTPATIENT)
Dept: ONCOLOGY | Age: 69
End: 2019-07-23

## 2019-07-23 DIAGNOSIS — C79.51 BONE METASTASIS (HCC): ICD-10-CM

## 2019-07-23 DIAGNOSIS — C20 RECTAL CANCER METASTASIZED TO LIVER (HCC): Primary | ICD-10-CM

## 2019-07-23 DIAGNOSIS — C78.7 RECTAL CANCER METASTASIZED TO LIVER (HCC): Primary | ICD-10-CM

## 2019-07-29 ENCOUNTER — HOSPITAL ENCOUNTER (OUTPATIENT)
Age: 69
Discharge: HOME | End: 2019-07-29
Payer: MEDICARE

## 2019-07-29 DIAGNOSIS — C78.7 RECTAL CANCER METASTASIZED TO LIVER (HCC): ICD-10-CM

## 2019-07-29 DIAGNOSIS — C20 RECTAL CANCER METASTASIZED TO LIVER (HCC): ICD-10-CM

## 2019-07-29 DIAGNOSIS — C79.51 BONE METASTASIS (HCC): ICD-10-CM

## 2019-07-29 LAB
ALBUMIN SERPL-MCNC: 3.8 G/DL (ref 3.5–5.2)
ALP BLD-CCNC: 154 U/L (ref 40–129)
ALT SERPL-CCNC: 20 U/L (ref 0–40)
ANION GAP SERPL CALCULATED.3IONS-SCNC: 10 MMOL/L (ref 7–16)
ANISOCYTOSIS: ABNORMAL
AST SERPL-CCNC: 34 U/L (ref 0–39)
BASOPHILS ABSOLUTE: 0.06 E9/L (ref 0–0.2)
BASOPHILS RELATIVE PERCENT: 1.8 % (ref 0–2)
BILIRUB SERPL-MCNC: 0.9 MG/DL (ref 0–1.2)
BUN BLDV-MCNC: 18 MG/DL (ref 8–23)
CALCIUM SERPL-MCNC: 9.8 MG/DL (ref 8.6–10.2)
CEA: 5 NG/ML (ref 0–5.2)
CHLORIDE BLD-SCNC: 107 MMOL/L (ref 98–107)
CO2: 27 MMOL/L (ref 22–29)
CREAT SERPL-MCNC: 1 MG/DL (ref 0.7–1.2)
EOSINOPHILS ABSOLUTE: 0.06 E9/L (ref 0.05–0.5)
EOSINOPHILS RELATIVE PERCENT: 1.8 % (ref 0–6)
GFR AFRICAN AMERICAN: >60
GFR NON-AFRICAN AMERICAN: >60 ML/MIN/1.73
GLUCOSE BLD-MCNC: 134 MG/DL (ref 74–99)
HCT VFR BLD CALC: 30.7 % (ref 37–54)
HEMOGLOBIN: 9.5 G/DL (ref 12.5–16.5)
LYMPHOCYTES ABSOLUTE: 0.31 E9/L (ref 1.5–4)
LYMPHOCYTES RELATIVE PERCENT: 8.9 % (ref 20–42)
MCH RBC QN AUTO: 32 PG (ref 26–35)
MCHC RBC AUTO-ENTMCNC: 30.9 % (ref 32–34.5)
MCV RBC AUTO: 103.4 FL (ref 80–99.9)
MONOCYTES ABSOLUTE: 0.17 E9/L (ref 0.1–0.95)
MONOCYTES RELATIVE PERCENT: 5.4 % (ref 2–12)
NEUTROPHILS ABSOLUTE: 2.79 E9/L (ref 1.8–7.3)
NEUTROPHILS RELATIVE PERCENT: 82.1 % (ref 43–80)
OVALOCYTES: ABNORMAL
PDW BLD-RTO: 17.7 FL (ref 11.5–15)
PLATELET # BLD: 92 E9/L (ref 130–450)
PLATELET CONFIRMATION: NORMAL
PMV BLD AUTO: 9.3 FL (ref 7–12)
POIKILOCYTES: ABNORMAL
POLYCHROMASIA: ABNORMAL
POTASSIUM SERPL-SCNC: 4 MMOL/L (ref 3.5–5)
RBC # BLD: 2.97 E12/L (ref 3.8–5.8)
SODIUM BLD-SCNC: 144 MMOL/L (ref 132–146)
TEAR DROP CELLS: ABNORMAL
TOTAL PROTEIN: 6.5 G/DL (ref 6.4–8.3)
WBC # BLD: 3.4 E9/L (ref 4.5–11.5)

## 2019-07-29 PROCEDURE — 85025 COMPLETE CBC W/AUTO DIFF WBC: CPT

## 2019-07-29 PROCEDURE — 82378 CARCINOEMBRYONIC ANTIGEN: CPT

## 2019-07-29 PROCEDURE — 80053 COMPREHEN METABOLIC PANEL: CPT

## 2019-07-29 PROCEDURE — 36415 COLL VENOUS BLD VENIPUNCTURE: CPT

## 2019-07-30 ENCOUNTER — HOSPITAL ENCOUNTER (OUTPATIENT)
Dept: INFUSION THERAPY | Age: 69
Discharge: HOME | End: 2019-07-30
Payer: MEDICARE

## 2019-07-30 ENCOUNTER — TELEPHONE (OUTPATIENT)
Dept: INFUSION THERAPY | Age: 69
End: 2019-07-30

## 2019-07-30 ENCOUNTER — OFFICE VISIT (OUTPATIENT)
Dept: ONCOLOGY | Age: 69
End: 2019-07-30
Payer: MEDICARE

## 2019-07-30 VITALS
TEMPERATURE: 97.6 F | HEIGHT: 73 IN | BODY MASS INDEX: 26.65 KG/M2 | OXYGEN SATURATION: 98 % | WEIGHT: 201.1 LBS | SYSTOLIC BLOOD PRESSURE: 119 MMHG | DIASTOLIC BLOOD PRESSURE: 66 MMHG | HEART RATE: 58 BPM

## 2019-07-30 VITALS — HEART RATE: 60 BPM | SYSTOLIC BLOOD PRESSURE: 136 MMHG | DIASTOLIC BLOOD PRESSURE: 75 MMHG | RESPIRATION RATE: 18 BRPM

## 2019-07-30 DIAGNOSIS — C78.7 RECTAL CANCER METASTASIZED TO LIVER (HCC): Primary | ICD-10-CM

## 2019-07-30 DIAGNOSIS — C20 RECTAL CANCER METASTASIZED TO LIVER (HCC): Primary | ICD-10-CM

## 2019-07-30 DIAGNOSIS — C20 RECTAL CANCER (HCC): Primary | ICD-10-CM

## 2019-07-30 PROCEDURE — 99214 OFFICE O/P EST MOD 30 MIN: CPT | Performed by: INTERNAL MEDICINE

## 2019-07-30 PROCEDURE — 96413 CHEMO IV INFUSION 1 HR: CPT

## 2019-07-30 PROCEDURE — 96368 THER/DIAG CONCURRENT INF: CPT

## 2019-07-30 PROCEDURE — 2580000003 HC RX 258: Performed by: INTERNAL MEDICINE

## 2019-07-30 PROCEDURE — 6360000002 HC RX W HCPCS: Performed by: INTERNAL MEDICINE

## 2019-07-30 PROCEDURE — 96375 TX/PRO/DX INJ NEW DRUG ADDON: CPT

## 2019-07-30 PROCEDURE — 96415 CHEMO IV INFUSION ADDL HR: CPT

## 2019-07-30 PROCEDURE — 96417 CHEMO IV INFUS EACH ADDL SEQ: CPT

## 2019-07-30 PROCEDURE — 96411 CHEMO IV PUSH ADDL DRUG: CPT

## 2019-07-30 RX ORDER — ATROPINE SULFATE 0.4 MG/ML
0.4 AMPUL (ML) INJECTION ONCE
Status: CANCELLED | OUTPATIENT
Start: 2019-07-30

## 2019-07-30 RX ORDER — FLUOROURACIL 50 MG/ML
850 INJECTION, SOLUTION INTRAVENOUS ONCE
Status: COMPLETED | OUTPATIENT
Start: 2019-07-30 | End: 2019-07-30

## 2019-07-30 RX ORDER — DIPHENHYDRAMINE HYDROCHLORIDE 50 MG/ML
50 INJECTION INTRAMUSCULAR; INTRAVENOUS ONCE
Status: CANCELLED | OUTPATIENT
Start: 2019-07-30

## 2019-07-30 RX ORDER — SODIUM CHLORIDE 9 MG/ML
INJECTION, SOLUTION INTRAVENOUS CONTINUOUS
Status: CANCELLED | OUTPATIENT
Start: 2019-07-30

## 2019-07-30 RX ORDER — ATROPINE SULFATE 0.4 MG/ML
0.4 AMPUL (ML) INJECTION ONCE
Status: COMPLETED | OUTPATIENT
Start: 2019-07-30 | End: 2019-07-30

## 2019-07-30 RX ORDER — SODIUM CHLORIDE 9 MG/ML
250 INJECTION, SOLUTION INTRAVENOUS CONTINUOUS
Status: CANCELLED | OUTPATIENT
Start: 2019-07-30

## 2019-07-30 RX ORDER — EPINEPHRINE 1 MG/ML
0.3 INJECTION, SOLUTION, CONCENTRATE INTRAVENOUS PRN
Status: CANCELLED | OUTPATIENT
Start: 2019-07-30

## 2019-07-30 RX ORDER — PALONOSETRON HYDROCHLORIDE 0.05 MG/ML
0.25 INJECTION, SOLUTION INTRAVENOUS ONCE
Status: COMPLETED | OUTPATIENT
Start: 2019-07-30 | End: 2019-07-30

## 2019-07-30 RX ORDER — SODIUM CHLORIDE 0.9 % (FLUSH) 0.9 %
10 SYRINGE (ML) INJECTION PRN
Status: CANCELLED | OUTPATIENT
Start: 2019-07-30

## 2019-07-30 RX ORDER — FLUOROURACIL 50 MG/ML
850 INJECTION, SOLUTION INTRAVENOUS ONCE
Status: CANCELLED | OUTPATIENT
Start: 2019-07-30

## 2019-07-30 RX ORDER — METHYLPREDNISOLONE SODIUM SUCCINATE 125 MG/2ML
125 INJECTION, POWDER, LYOPHILIZED, FOR SOLUTION INTRAMUSCULAR; INTRAVENOUS ONCE
Status: CANCELLED | OUTPATIENT
Start: 2019-07-30

## 2019-07-30 RX ORDER — PALONOSETRON HYDROCHLORIDE 0.05 MG/ML
0.25 INJECTION, SOLUTION INTRAVENOUS ONCE
Status: CANCELLED | OUTPATIENT
Start: 2019-07-30

## 2019-07-30 RX ORDER — SODIUM CHLORIDE 0.9 % (FLUSH) 0.9 %
10 SYRINGE (ML) INJECTION PRN
Status: DISCONTINUED | OUTPATIENT
Start: 2019-07-30 | End: 2019-07-31 | Stop reason: HOSPADM

## 2019-07-30 RX ORDER — DEXAMETHASONE SODIUM PHOSPHATE 10 MG/ML
10 INJECTION, SOLUTION INTRAMUSCULAR; INTRAVENOUS ONCE
Status: CANCELLED | OUTPATIENT
Start: 2019-07-30

## 2019-07-30 RX ORDER — HEPARIN SODIUM (PORCINE) LOCK FLUSH IV SOLN 100 UNIT/ML 100 UNIT/ML
500 SOLUTION INTRAVENOUS PRN
Status: CANCELLED | OUTPATIENT
Start: 2019-07-30

## 2019-07-30 RX ORDER — DEXAMETHASONE SODIUM PHOSPHATE 10 MG/ML
10 INJECTION INTRAMUSCULAR; INTRAVENOUS ONCE
Status: COMPLETED | OUTPATIENT
Start: 2019-07-30 | End: 2019-07-30

## 2019-07-30 RX ORDER — 0.9 % SODIUM CHLORIDE 0.9 %
10 VIAL (ML) INJECTION ONCE
Status: CANCELLED | OUTPATIENT
Start: 2019-07-30

## 2019-07-30 RX ORDER — SODIUM CHLORIDE 9 MG/ML
250 INJECTION, SOLUTION INTRAVENOUS CONTINUOUS
Status: DISCONTINUED | OUTPATIENT
Start: 2019-07-30 | End: 2019-07-31 | Stop reason: HOSPADM

## 2019-07-30 RX ORDER — HEPARIN SODIUM (PORCINE) LOCK FLUSH IV SOLN 100 UNIT/ML 100 UNIT/ML
500 SOLUTION INTRAVENOUS PRN
Status: DISCONTINUED | OUTPATIENT
Start: 2019-07-30 | End: 2019-07-31 | Stop reason: HOSPADM

## 2019-07-30 RX ADMIN — SODIUM CHLORIDE 250 ML: 9 INJECTION, SOLUTION INTRAVENOUS at 09:53

## 2019-07-30 RX ADMIN — Medication 500 UNITS: at 13:13

## 2019-07-30 RX ADMIN — DEXAMETHASONE SODIUM PHOSPHATE 10 MG: 10 INJECTION INTRAMUSCULAR; INTRAVENOUS at 09:57

## 2019-07-30 RX ADMIN — ATROPINE SULFATE 0.4 MG: 0.4 INJECTION, SOLUTION INTRAMUSCULAR; INTRAVENOUS; SUBCUTANEOUS at 09:55

## 2019-07-30 RX ADMIN — BEVACIZUMAB 400 MG: 400 INJECTION, SOLUTION INTRAVENOUS at 10:29

## 2019-07-30 RX ADMIN — LEUCOVORIN CALCIUM 850 MG: 10 INJECTION INTRAMUSCULAR; INTRAVENOUS at 11:12

## 2019-07-30 RX ADMIN — PALONOSETRON 0.25 MG: 0.25 INJECTION, SOLUTION INTRAVENOUS at 09:53

## 2019-07-30 RX ADMIN — SODIUM CHLORIDE 400 MG: 9 INJECTION, SOLUTION INTRAVENOUS at 11:14

## 2019-07-30 RX ADMIN — FLUOROURACIL 850 MG: 50 INJECTION, SOLUTION INTRAVENOUS at 13:08

## 2019-07-30 RX ADMIN — Medication 10 ML: at 13:13

## 2019-07-30 NOTE — PROGRESS NOTES
inflammatory etiology. Followup CT chest in 3 months. Slight increased linear sclerosis along the left anterior sixth rib corresponding to an area of increased uptake on the prior bone scan from 10/21/2016, favored to be related to interval healing changes of a nondisplaced fracture. CT abdomen/pelvis on 01/23/2017 Redemonstration of multiple hepatic metastases, which are similar compared to the prior CT from 10/21/2016. Redemonstration of area of increased sclerosis along the right acetabulum suspicious for metastatic disease. Bone scan on 01/23/2017 Stable subtle uptake within the left proximal diaphysis, again suggestive of metastatic disease  Decreased subtle uptake within the medial right acetabulum. Decreased uptake within the left anterior sixth rib corresponding to linear sclerosis on the recent CT, favored to be related to an joint rib fracture. Continue FOLFIRI + Avastin and repeat scans in 3 months. Cycle # 13 FOLFIRI + Avastin was on 01/24/2017. Cycle # 14 FOLFIRI + Avastin was on 02/07/2017. Cycle # 15 FOLFIRI + Avastin was on 02/21/2017. Cycle # 16 FOLFIRI + Avastin was on 03/07/2017. Cycle # 17 FOLFIRI + Avastin was on 03/21/2017. Cycle # 18 FOLFIRI + Avastin was on 04/04/2017. CT chest 04/17/2017 negative for metastatic disease. CT abdomen/pelvis 04/17/2017 Stable hypodense metastatic lesions within the liver. Stable area of increased sclerosis along the right acetabulum  Bone scan 04/17/2017 Stable subtle uptake within the left proximal femoral diaphysis; No definite abnormal uptake in the region of a sclerotic lesion along the posterior column of the right acetabulum noted on CT. Stable slight uptake within the left anterior sixth rib corresponding to linear sclerosis on the recent CT, favored to be related to a fracture. Continue FOLFIRI + Avastin and repeat scans in 3 months. Cycle # 19 FOLFIRI + Avastin was on 04/18/2017. CEA 7.5 on 04/18/2017.   Cycle # 20 FOLFIRI + 02/26/2019. CEA 4.7 on 02/25/2019. Cycle # 65 FOLFIRI + Avastin was on 03/12/2019. CEA 4.0 on 03/11/2019. Cycle # 66 FOLFIRI + Avastin was on 03/26/2019. CEA 4.7 on 03/25/2019. Cycle # 67 FOLFIRI + Avastin was on 04/09/2019. CEA 5.6 on 04/08/2019. Cycle # 68 FOLFIRI + Avastin was on 04/30/2019. CEA 4.9 on 04/29/2019. Cycle # 69 FOLFIRI + Avastin was on 05/14/2019. CEA 5.3 on 05/14/2019. CT chest 05/23/2019 stable small lung nodule with no evidence of metastatic disease. CT abdomen/pelvis 05/23/2019 Decrease conspicuity of the liver lesions. No new lesions seen. Stable splenomegaly. Continue FOLFIRI + Avastin and repeat scans in 3 months. Cycle # 70 FOLFIRI + Avastin was on 06/04/2019. CEA 4.8 on 06/03/2019. Cycle # 71 FOLFIRI + Avastin was on 06/18/2019. CEA 4.6 on 06/17/2019. Cycle # 72 FOLFIRI + Avastin was on 07/09/2019. CEA 4.3 on 07/08/2019. Cycle # 73 FOLFIRI + Avastin is today 07/30/2019. CEA 5.0 on 07/29/2019. RTC in 2 weeks for Cycle # 74 FOLFIRI + Avastin.   MSI testing noted no mismatch repair protein loss of expression    7/30/2019  Magui Jain MD  Board Certified Medical Oncologist

## 2019-08-01 ENCOUNTER — HOSPITAL ENCOUNTER (OUTPATIENT)
Dept: INFUSION THERAPY | Age: 69
Discharge: HOME | End: 2019-08-01
Payer: MEDICARE

## 2019-08-01 DIAGNOSIS — C78.7 RECTAL CANCER METASTASIZED TO LIVER (HCC): Primary | ICD-10-CM

## 2019-08-01 DIAGNOSIS — C20 RECTAL CANCER METASTASIZED TO LIVER (HCC): Primary | ICD-10-CM

## 2019-08-01 PROCEDURE — 6360000002 HC RX W HCPCS: Performed by: INTERNAL MEDICINE

## 2019-08-01 PROCEDURE — 96372 THER/PROPH/DIAG INJ SC/IM: CPT

## 2019-08-01 RX ADMIN — PEGFILGRASTIM 6 MG: 6 INJECTION SUBCUTANEOUS at 15:11

## 2019-08-05 ENCOUNTER — TELEPHONE (OUTPATIENT)
Dept: INFUSION THERAPY | Age: 69
End: 2019-08-05

## 2019-08-12 ENCOUNTER — TELEPHONE (OUTPATIENT)
Dept: INFUSION THERAPY | Age: 69
End: 2019-08-12

## 2019-08-12 ENCOUNTER — HOSPITAL ENCOUNTER (OUTPATIENT)
Age: 69
Discharge: HOME | End: 2019-08-12
Payer: MEDICARE

## 2019-08-12 DIAGNOSIS — C78.7 RECTAL CANCER METASTASIZED TO LIVER (HCC): ICD-10-CM

## 2019-08-12 DIAGNOSIS — C79.51 BONE METASTASIS (HCC): ICD-10-CM

## 2019-08-12 DIAGNOSIS — C20 RECTAL CANCER METASTASIZED TO LIVER (HCC): ICD-10-CM

## 2019-08-12 LAB
ALBUMIN SERPL-MCNC: 3.6 G/DL (ref 3.5–5.2)
ALP BLD-CCNC: 158 U/L (ref 40–129)
ALT SERPL-CCNC: 16 U/L (ref 0–40)
ANION GAP SERPL CALCULATED.3IONS-SCNC: 10 MMOL/L (ref 7–16)
ANISOCYTOSIS: ABNORMAL
AST SERPL-CCNC: 22 U/L (ref 0–39)
BASOPHILS ABSOLUTE: 0 E9/L (ref 0–0.2)
BASOPHILS RELATIVE PERCENT: 0.8 % (ref 0–2)
BILIRUB SERPL-MCNC: 0.7 MG/DL (ref 0–1.2)
BLASTS RELATIVE PERCENT: 0.9 % (ref 0–0)
BUN BLDV-MCNC: 19 MG/DL (ref 8–23)
CALCIUM SERPL-MCNC: 9 MG/DL (ref 8.6–10.2)
CEA: 5 NG/ML (ref 0–5.2)
CHLORIDE BLD-SCNC: 107 MMOL/L (ref 98–107)
CO2: 26 MMOL/L (ref 22–29)
CREAT SERPL-MCNC: 1.2 MG/DL (ref 0.7–1.2)
EOSINOPHILS ABSOLUTE: 0.12 E9/L (ref 0.05–0.5)
EOSINOPHILS RELATIVE PERCENT: 5.2 % (ref 0–6)
GFR AFRICAN AMERICAN: >60
GFR NON-AFRICAN AMERICAN: 60 ML/MIN/1.73
GLUCOSE BLD-MCNC: 104 MG/DL (ref 74–99)
HCT VFR BLD CALC: 27.9 % (ref 37–54)
HEMOGLOBIN: 8.7 G/DL (ref 12.5–16.5)
LYMPHOCYTES ABSOLUTE: 0.41 E9/L (ref 1.5–4)
LYMPHOCYTES RELATIVE PERCENT: 16.5 % (ref 20–42)
MCH RBC QN AUTO: 32 PG (ref 26–35)
MCHC RBC AUTO-ENTMCNC: 31.2 % (ref 32–34.5)
MCV RBC AUTO: 102.6 FL (ref 80–99.9)
MONOCYTES ABSOLUTE: 0.12 E9/L (ref 0.1–0.95)
MONOCYTES RELATIVE PERCENT: 5.2 % (ref 2–12)
NEUTROPHILS ABSOLUTE: 1.73 E9/L (ref 1.8–7.3)
NEUTROPHILS RELATIVE PERCENT: 72.2 % (ref 43–80)
OVALOCYTES: ABNORMAL
PDW BLD-RTO: 17.4 FL (ref 11.5–15)
PLATELET # BLD: 89 E9/L (ref 130–450)
PLATELET CONFIRMATION: NORMAL
PMV BLD AUTO: 9.9 FL (ref 7–12)
POIKILOCYTES: ABNORMAL
POLYCHROMASIA: ABNORMAL
POTASSIUM SERPL-SCNC: 3.7 MMOL/L (ref 3.5–5)
RBC # BLD: 2.72 E12/L (ref 3.8–5.8)
SODIUM BLD-SCNC: 143 MMOL/L (ref 132–146)
TOTAL PROTEIN: 6.3 G/DL (ref 6.4–8.3)
WBC # BLD: 2.4 E9/L (ref 4.5–11.5)

## 2019-08-12 PROCEDURE — 36415 COLL VENOUS BLD VENIPUNCTURE: CPT

## 2019-08-12 PROCEDURE — 82378 CARCINOEMBRYONIC ANTIGEN: CPT

## 2019-08-12 PROCEDURE — 80053 COMPREHEN METABOLIC PANEL: CPT

## 2019-08-12 PROCEDURE — 85025 COMPLETE CBC W/AUTO DIFF WBC: CPT

## 2019-08-13 ENCOUNTER — TELEPHONE (OUTPATIENT)
Dept: ONCOLOGY | Age: 69
End: 2019-08-13

## 2019-08-13 ENCOUNTER — OFFICE VISIT (OUTPATIENT)
Dept: ONCOLOGY | Age: 69
End: 2019-08-13
Payer: MEDICARE

## 2019-08-13 ENCOUNTER — HOSPITAL ENCOUNTER (OUTPATIENT)
Dept: INFUSION THERAPY | Age: 69
Discharge: HOME | End: 2019-08-13
Payer: MEDICARE

## 2019-08-13 VITALS
WEIGHT: 201.6 LBS | OXYGEN SATURATION: 98 % | DIASTOLIC BLOOD PRESSURE: 68 MMHG | HEIGHT: 73 IN | TEMPERATURE: 96.3 F | SYSTOLIC BLOOD PRESSURE: 120 MMHG | HEART RATE: 64 BPM | BODY MASS INDEX: 26.72 KG/M2

## 2019-08-13 VITALS — DIASTOLIC BLOOD PRESSURE: 68 MMHG | RESPIRATION RATE: 18 BRPM | SYSTOLIC BLOOD PRESSURE: 130 MMHG | HEART RATE: 60 BPM

## 2019-08-13 DIAGNOSIS — C20 RECTAL CANCER METASTASIZED TO LIVER (HCC): Primary | ICD-10-CM

## 2019-08-13 DIAGNOSIS — C78.7 RECTAL CANCER METASTASIZED TO LIVER (HCC): Primary | ICD-10-CM

## 2019-08-13 DIAGNOSIS — C20 RECTAL CANCER (HCC): Primary | ICD-10-CM

## 2019-08-13 PROCEDURE — 2580000003 HC RX 258: Performed by: INTERNAL MEDICINE

## 2019-08-13 PROCEDURE — 96415 CHEMO IV INFUSION ADDL HR: CPT

## 2019-08-13 PROCEDURE — 96368 THER/DIAG CONCURRENT INF: CPT

## 2019-08-13 PROCEDURE — 6360000002 HC RX W HCPCS: Performed by: INTERNAL MEDICINE

## 2019-08-13 PROCEDURE — 99214 OFFICE O/P EST MOD 30 MIN: CPT | Performed by: INTERNAL MEDICINE

## 2019-08-13 PROCEDURE — 96417 CHEMO IV INFUS EACH ADDL SEQ: CPT

## 2019-08-13 PROCEDURE — 96413 CHEMO IV INFUSION 1 HR: CPT

## 2019-08-13 PROCEDURE — 96411 CHEMO IV PUSH ADDL DRUG: CPT

## 2019-08-13 PROCEDURE — 96375 TX/PRO/DX INJ NEW DRUG ADDON: CPT

## 2019-08-13 RX ORDER — PALONOSETRON HYDROCHLORIDE 0.05 MG/ML
0.25 INJECTION, SOLUTION INTRAVENOUS ONCE
Status: CANCELLED | OUTPATIENT
Start: 2019-08-13

## 2019-08-13 RX ORDER — ATROPINE SULFATE 0.4 MG/ML
0.4 AMPUL (ML) INJECTION ONCE
Status: COMPLETED | OUTPATIENT
Start: 2019-08-13 | End: 2019-08-13

## 2019-08-13 RX ORDER — SODIUM CHLORIDE 9 MG/ML
INJECTION, SOLUTION INTRAVENOUS CONTINUOUS
Status: CANCELLED | OUTPATIENT
Start: 2019-08-13

## 2019-08-13 RX ORDER — SODIUM CHLORIDE 9 MG/ML
250 INJECTION, SOLUTION INTRAVENOUS CONTINUOUS
Status: DISCONTINUED | OUTPATIENT
Start: 2019-08-13 | End: 2019-08-14 | Stop reason: HOSPADM

## 2019-08-13 RX ORDER — ATROPINE SULFATE 0.4 MG/ML
0.4 AMPUL (ML) INJECTION ONCE
Status: CANCELLED | OUTPATIENT
Start: 2019-08-13

## 2019-08-13 RX ORDER — EPINEPHRINE 1 MG/ML
0.3 INJECTION, SOLUTION, CONCENTRATE INTRAVENOUS PRN
Status: CANCELLED | OUTPATIENT
Start: 2019-08-13

## 2019-08-13 RX ORDER — SODIUM CHLORIDE 9 MG/ML
250 INJECTION, SOLUTION INTRAVENOUS CONTINUOUS
Status: CANCELLED | OUTPATIENT
Start: 2019-08-13

## 2019-08-13 RX ORDER — HEPARIN SODIUM (PORCINE) LOCK FLUSH IV SOLN 100 UNIT/ML 100 UNIT/ML
500 SOLUTION INTRAVENOUS PRN
Status: DISCONTINUED | OUTPATIENT
Start: 2019-08-13 | End: 2019-08-14 | Stop reason: HOSPADM

## 2019-08-13 RX ORDER — METHYLPREDNISOLONE SODIUM SUCCINATE 125 MG/2ML
125 INJECTION, POWDER, LYOPHILIZED, FOR SOLUTION INTRAMUSCULAR; INTRAVENOUS ONCE
Status: CANCELLED | OUTPATIENT
Start: 2019-08-13

## 2019-08-13 RX ORDER — PALONOSETRON HYDROCHLORIDE 0.05 MG/ML
0.25 INJECTION, SOLUTION INTRAVENOUS ONCE
Status: COMPLETED | OUTPATIENT
Start: 2019-08-13 | End: 2019-08-13

## 2019-08-13 RX ORDER — FLUOROURACIL 50 MG/ML
850 INJECTION, SOLUTION INTRAVENOUS ONCE
Status: CANCELLED | OUTPATIENT
Start: 2019-08-13

## 2019-08-13 RX ORDER — FLUOROURACIL 50 MG/ML
850 INJECTION, SOLUTION INTRAVENOUS ONCE
Status: COMPLETED | OUTPATIENT
Start: 2019-08-13 | End: 2019-08-13

## 2019-08-13 RX ORDER — DEXAMETHASONE SODIUM PHOSPHATE 10 MG/ML
10 INJECTION INTRAMUSCULAR; INTRAVENOUS ONCE
Status: COMPLETED | OUTPATIENT
Start: 2019-08-13 | End: 2019-08-13

## 2019-08-13 RX ORDER — SODIUM CHLORIDE 0.9 % (FLUSH) 0.9 %
10 SYRINGE (ML) INJECTION PRN
Status: CANCELLED | OUTPATIENT
Start: 2019-08-13

## 2019-08-13 RX ORDER — DIPHENHYDRAMINE HYDROCHLORIDE 50 MG/ML
50 INJECTION INTRAMUSCULAR; INTRAVENOUS ONCE
Status: CANCELLED | OUTPATIENT
Start: 2019-08-13

## 2019-08-13 RX ORDER — HEPARIN SODIUM (PORCINE) LOCK FLUSH IV SOLN 100 UNIT/ML 100 UNIT/ML
500 SOLUTION INTRAVENOUS PRN
Status: CANCELLED | OUTPATIENT
Start: 2019-08-13

## 2019-08-13 RX ORDER — DEXAMETHASONE SODIUM PHOSPHATE 10 MG/ML
10 INJECTION, SOLUTION INTRAMUSCULAR; INTRAVENOUS ONCE
Status: CANCELLED | OUTPATIENT
Start: 2019-08-13

## 2019-08-13 RX ORDER — 0.9 % SODIUM CHLORIDE 0.9 %
10 VIAL (ML) INJECTION ONCE
Status: CANCELLED | OUTPATIENT
Start: 2019-08-13

## 2019-08-13 RX ORDER — SODIUM CHLORIDE 0.9 % (FLUSH) 0.9 %
10 SYRINGE (ML) INJECTION PRN
Status: DISCONTINUED | OUTPATIENT
Start: 2019-08-13 | End: 2019-08-14 | Stop reason: HOSPADM

## 2019-08-13 RX ADMIN — ATROPINE SULFATE 0.4 MG: 0.4 INJECTION, SOLUTION INTRAMUSCULAR; INTRAVENOUS; SUBCUTANEOUS at 09:30

## 2019-08-13 RX ADMIN — FLUOROURACIL 850 MG: 50 INJECTION, SOLUTION INTRAVENOUS at 12:31

## 2019-08-13 RX ADMIN — SODIUM CHLORIDE 400 MG: 9 INJECTION, SOLUTION INTRAVENOUS at 10:41

## 2019-08-13 RX ADMIN — BEVACIZUMAB 400 MG: 400 INJECTION, SOLUTION INTRAVENOUS at 09:50

## 2019-08-13 RX ADMIN — LEUCOVORIN CALCIUM 850 MG: 10 INJECTION INTRAMUSCULAR; INTRAVENOUS at 10:41

## 2019-08-13 RX ADMIN — SODIUM CHLORIDE 250 ML: 9 INJECTION, SOLUTION INTRAVENOUS at 09:27

## 2019-08-13 RX ADMIN — Medication 500 UNITS: at 12:36

## 2019-08-13 RX ADMIN — DEXAMETHASONE SODIUM PHOSPHATE 10 MG: 10 INJECTION INTRAMUSCULAR; INTRAVENOUS at 09:32

## 2019-08-13 RX ADMIN — PALONOSETRON 0.25 MG: 0.25 INJECTION, SOLUTION INTRAVENOUS at 09:29

## 2019-08-13 RX ADMIN — Medication 10 ML: at 12:36

## 2019-08-13 NOTE — PROGRESS NOTES
re-stage in 2-3 months. Cycle # 12 FOLFOX + Avastin was on 04/26/2016. .5 on 04/26/2016. Cycle # 13 FOLFOX + Avastin was on 05/10/2016. .3 on 05/10/2016. Cycle # 14 FOLFOX+AVASTIN was on 05/24/2016. .5 on 05/24/2016. Cycle # 15 FOLFOX + Avastin was on 06/07/2016. CEA 90.5 on 06/07/2016. Admitted to [facility] 06/13/2016-06/16/2016 for abdominal pain: EGD noted 1.5 cm clean based duodenal bulb ulceration s/p epinephrine and bicap per Dr. Uday Bryan. No active bleeding. A. Stomach, biopsy: Mild chronic gastritis, immunostain negative for Helicobacter  B. Esophagus, biopsy: Gastric glandular mucosa with prominent intestinal metaplasia ([place]'s epithelium), negative for epithelial dysplasia, esophageal squamous mucosa not identified  Cycle # 16 FOLFOX (discontinued avastin (bevacizumab) given association of peptic ulcer disease and known association of GI perforation) was on 06/21/2016. Cycle # 17 FOLFOX was on 07/05/2016. CEA 52.6 on 07/19/2016. Cycle # 17 FOLFOX was on 07/05/2016. CEA 52.6 on 07/05/2016. CEA 47.6 on 07/19/2016.     Re-staging scans 07/19/2106: CT Chest negative for metastatic disease. CT Abdomen/Pelvis: Stable hepatic lesions; Question new mural thickening in the cecum. Bone Scan: New Let hip lesion suspicious for bone metastasis.     Increased CEA; new mural thickening in the cecum and new left hip lesion suspicious for bone metastasis are consistent with disease progression; He derived maximum benefit from FOLFOX/AVASTIN. D/C FOLFOX/AVASTIN. We recommended FOLFIRI second line therapy. Cycle # 1 FOLFIRI was on 08/02/2016. CEA 40.8 on 08/02/2016. Xgeva q4 weeks started on 08/02/2016. Cycle # 2 FOLFIRI was on 08/16/2016. CEA 31.7 on 08/16/2016. Cycle # 3 FOLFIRI (added Avastin) was on 08/30/2016 given that ulcers healed on EGD 08/15/2016 by Dr. Karla Mathis; Protonix bid since avastin re-started. Colonoscopy on 08/29/2016 (to look at the cecal area) unremarkable.  CEA 26.7 Avastin was on 08/28/2018. CEA 6.5 on 08/27/2018. CT chest 09/06/2018 noted interval decrease in size of LUCY measuring up to 4 mm, suggestive of treatment response. No mediastinal or hilar LN  CT abdomen/pelvis 09/06/2018 Slight interval increase in size of a previously noted metastatic lesion within the right hepatic lobe. This may be related to differences in phase of enhancement compared to the most recent study from 5/31/2018, the lesion remains decreased in size compared to the more previous studies. No abdominal, retroperitoneal, or pelvic lymphadenopathy. Continue FOLFIRI + Avastin and repeat scans in 2 months. Cycle # 54 FOLFIRI + Avastin was on 09/11/2018. CEA 6.4 on 09/10/2018. Cycle # 55 FOLFIRI + Avastin was on 09/25/2018. CEA 6.4 on 09/25/2018. Cycle # 56 FOLFIRI + Avastin was on 10/09/2018. CEA 6.7 on 10/08/2018. Cycle # 57 FOLFIRI + Avastin was on 10/23/2018. CEA 7.2 on 10/22/2018. CT chest 11/02/2018 stable 3 mm nodule within LUCY. No new right and left lung nodule. No mediastinal or osseous lesion. CT abdomen/pelvis 11/02/2018 stable metastatic disease to liver. No new metastatic disease identified. No mesenteric or retroperitoneal LN. No gross colonic lesion identified. Continue FOLFIRI + Avastin and repeat scans in 3 months. Cycle # 58 FOLFIRI + Avastin was on 11/06/2018. CEA 7.6 on 11/05/2018. Cycle # 59 FOLFIRI + Avastin was on 11/27/2018. CEA 6.9 on 11/26/2018. Cycle # 60 FOLFIRI + Avastin was on 12/11/2018. CEA 6.7 on 12/11/2018. Cycle # 61 FOLFIRI + Avastin was on 01/08/2019. CEA 5.6 on 01/07/2019. Cycle # 62 FOLFIRI + Avastin was on 01/29/2019. CEA 4.6 on 01/28/2019. Cycle # 63 FOLFIRI + Avastin was on 02/12/2019. CEA 4.3 on 02/11/2019. CT chest 02/21/2019 no evidence of metastatic disease. CT abdomen/pelvis 02/21/2019 stable hypodense liver lesions. No evidence of worsening metastatic disease.  Large right T10-T11 paracentral disc herniation with probable cord

## 2019-08-15 ENCOUNTER — HOSPITAL ENCOUNTER (OUTPATIENT)
Dept: INFUSION THERAPY | Age: 69
Discharge: HOME | End: 2019-08-15
Payer: MEDICARE

## 2019-08-15 DIAGNOSIS — C78.7 RECTAL CANCER METASTASIZED TO LIVER (HCC): Primary | ICD-10-CM

## 2019-08-15 DIAGNOSIS — C20 RECTAL CANCER METASTASIZED TO LIVER (HCC): Primary | ICD-10-CM

## 2019-08-15 PROCEDURE — 6360000002 HC RX W HCPCS: Performed by: INTERNAL MEDICINE

## 2019-08-15 PROCEDURE — 96372 THER/PROPH/DIAG INJ SC/IM: CPT

## 2019-08-15 RX ADMIN — PEGFILGRASTIM 6 MG: 6 INJECTION SUBCUTANEOUS at 14:30

## 2019-08-20 ENCOUNTER — HOSPITAL ENCOUNTER (OUTPATIENT)
Dept: CT IMAGING | Age: 69
Discharge: HOME | End: 2019-08-20
Payer: MEDICARE

## 2019-08-20 DIAGNOSIS — C20 RECTAL CANCER (HCC): ICD-10-CM

## 2019-08-20 PROCEDURE — 74177 CT ABD & PELVIS W/CONTRAST: CPT

## 2019-08-20 PROCEDURE — 6360000004 HC RX CONTRAST MEDICATION: Performed by: RADIOLOGY

## 2019-08-20 PROCEDURE — 71260 CT THORAX DX C+: CPT

## 2019-08-20 RX ADMIN — IOHEXOL 50 ML: 240 INJECTION, SOLUTION INTRATHECAL; INTRAVASCULAR; INTRAVENOUS; ORAL at 12:30

## 2019-08-20 RX ADMIN — IOPAMIDOL 80 ML: 755 INJECTION, SOLUTION INTRAVENOUS at 12:31

## 2019-08-26 ENCOUNTER — HOSPITAL ENCOUNTER (OUTPATIENT)
Age: 69
Discharge: HOME | End: 2019-08-26
Payer: MEDICARE

## 2019-08-26 DIAGNOSIS — C79.51 BONE METASTASIS (HCC): ICD-10-CM

## 2019-08-26 DIAGNOSIS — C78.7 RECTAL CANCER METASTASIZED TO LIVER (HCC): ICD-10-CM

## 2019-08-26 DIAGNOSIS — C20 RECTAL CANCER METASTASIZED TO LIVER (HCC): ICD-10-CM

## 2019-08-26 LAB
ALBUMIN SERPL-MCNC: 3.8 G/DL (ref 3.5–5.2)
ALP BLD-CCNC: 159 U/L (ref 40–129)
ALT SERPL-CCNC: 16 U/L (ref 0–40)
ANION GAP SERPL CALCULATED.3IONS-SCNC: 11 MMOL/L (ref 7–16)
ANISOCYTOSIS: ABNORMAL
AST SERPL-CCNC: 23 U/L (ref 0–39)
BASOPHILS ABSOLUTE: 0 E9/L (ref 0–0.2)
BASOPHILS RELATIVE PERCENT: 0.6 % (ref 0–2)
BILIRUB SERPL-MCNC: 0.6 MG/DL (ref 0–1.2)
BUN BLDV-MCNC: 15 MG/DL (ref 8–23)
CALCIUM SERPL-MCNC: 9.3 MG/DL (ref 8.6–10.2)
CEA: 5 NG/ML (ref 0–5.2)
CHLORIDE BLD-SCNC: 105 MMOL/L (ref 98–107)
CO2: 25 MMOL/L (ref 22–29)
CREAT SERPL-MCNC: 1.1 MG/DL (ref 0.7–1.2)
DOHLE BODIES: ABNORMAL
EOSINOPHILS ABSOLUTE: 0.03 E9/L (ref 0.05–0.5)
EOSINOPHILS RELATIVE PERCENT: 1.8 % (ref 0–6)
GFR AFRICAN AMERICAN: >60
GFR NON-AFRICAN AMERICAN: >60 ML/MIN/1.73
GLUCOSE BLD-MCNC: 127 MG/DL (ref 74–99)
HCT VFR BLD CALC: 27.3 % (ref 37–54)
HEMOGLOBIN: 8.4 G/DL (ref 12.5–16.5)
HYPOCHROMIA: ABNORMAL
LYMPHOCYTES ABSOLUTE: 0.49 E9/L (ref 1.5–4)
LYMPHOCYTES RELATIVE PERCENT: 28.9 % (ref 20–42)
MCH RBC QN AUTO: 31.3 PG (ref 26–35)
MCHC RBC AUTO-ENTMCNC: 30.8 % (ref 32–34.5)
MCV RBC AUTO: 101.9 FL (ref 80–99.9)
MONOCYTES ABSOLUTE: 0.07 E9/L (ref 0.1–0.95)
MONOCYTES RELATIVE PERCENT: 4.4 % (ref 2–12)
NEUTROPHILS ABSOLUTE: 1.11 E9/L (ref 1.8–7.3)
NEUTROPHILS RELATIVE PERCENT: 64.9 % (ref 43–80)
OVALOCYTES: ABNORMAL
PDW BLD-RTO: 17.2 FL (ref 11.5–15)
PLATELET # BLD: 99 E9/L (ref 130–450)
PLATELET CONFIRMATION: NORMAL
PMV BLD AUTO: 9.8 FL (ref 7–12)
POIKILOCYTES: ABNORMAL
POLYCHROMASIA: ABNORMAL
POTASSIUM SERPL-SCNC: 3.6 MMOL/L (ref 3.5–5)
RBC # BLD: 2.68 E12/L (ref 3.8–5.8)
SODIUM BLD-SCNC: 141 MMOL/L (ref 132–146)
TEAR DROP CELLS: ABNORMAL
TOTAL PROTEIN: 6.2 G/DL (ref 6.4–8.3)
TOXIC GRANULATION: ABNORMAL
WBC # BLD: 1.7 E9/L (ref 4.5–11.5)

## 2019-08-26 PROCEDURE — 85025 COMPLETE CBC W/AUTO DIFF WBC: CPT

## 2019-08-26 PROCEDURE — 82378 CARCINOEMBRYONIC ANTIGEN: CPT

## 2019-08-26 PROCEDURE — 36415 COLL VENOUS BLD VENIPUNCTURE: CPT

## 2019-08-26 PROCEDURE — 80053 COMPREHEN METABOLIC PANEL: CPT

## 2019-08-27 ENCOUNTER — TELEPHONE (OUTPATIENT)
Dept: INFUSION THERAPY | Age: 69
End: 2019-08-27

## 2019-08-27 ENCOUNTER — HOSPITAL ENCOUNTER (OUTPATIENT)
Dept: INFUSION THERAPY | Age: 69
Discharge: HOME | End: 2019-08-27
Payer: MEDICARE

## 2019-08-27 ENCOUNTER — OFFICE VISIT (OUTPATIENT)
Dept: ONCOLOGY | Age: 69
End: 2019-08-27
Payer: MEDICARE

## 2019-08-27 VITALS
HEIGHT: 73 IN | WEIGHT: 202 LBS | OXYGEN SATURATION: 98 % | BODY MASS INDEX: 26.77 KG/M2 | HEART RATE: 66 BPM | DIASTOLIC BLOOD PRESSURE: 76 MMHG | SYSTOLIC BLOOD PRESSURE: 143 MMHG | TEMPERATURE: 97.6 F

## 2019-08-27 VITALS — HEART RATE: 68 BPM | RESPIRATION RATE: 18 BRPM | SYSTOLIC BLOOD PRESSURE: 136 MMHG | DIASTOLIC BLOOD PRESSURE: 67 MMHG

## 2019-08-27 DIAGNOSIS — C78.7 RECTAL CANCER METASTASIZED TO LIVER (HCC): Primary | ICD-10-CM

## 2019-08-27 DIAGNOSIS — Z09 FOLLOW UP: Primary | ICD-10-CM

## 2019-08-27 DIAGNOSIS — C79.51 BONE METASTASIS (HCC): ICD-10-CM

## 2019-08-27 DIAGNOSIS — C20 RECTAL CANCER METASTASIZED TO LIVER (HCC): Primary | ICD-10-CM

## 2019-08-27 PROCEDURE — 6360000002 HC RX W HCPCS: Performed by: INTERNAL MEDICINE

## 2019-08-27 PROCEDURE — 96372 THER/PROPH/DIAG INJ SC/IM: CPT

## 2019-08-27 PROCEDURE — 96368 THER/DIAG CONCURRENT INF: CPT

## 2019-08-27 PROCEDURE — 96411 CHEMO IV PUSH ADDL DRUG: CPT

## 2019-08-27 PROCEDURE — 2580000003 HC RX 258: Performed by: INTERNAL MEDICINE

## 2019-08-27 PROCEDURE — 96413 CHEMO IV INFUSION 1 HR: CPT

## 2019-08-27 PROCEDURE — 99214 OFFICE O/P EST MOD 30 MIN: CPT | Performed by: INTERNAL MEDICINE

## 2019-08-27 PROCEDURE — 96417 CHEMO IV INFUS EACH ADDL SEQ: CPT

## 2019-08-27 PROCEDURE — 96375 TX/PRO/DX INJ NEW DRUG ADDON: CPT

## 2019-08-27 PROCEDURE — 96415 CHEMO IV INFUSION ADDL HR: CPT

## 2019-08-27 RX ORDER — ATROPINE SULFATE 0.4 MG/ML
0.4 AMPUL (ML) INJECTION ONCE
Status: CANCELLED | OUTPATIENT
Start: 2019-08-27

## 2019-08-27 RX ORDER — HEPARIN SODIUM (PORCINE) LOCK FLUSH IV SOLN 100 UNIT/ML 100 UNIT/ML
500 SOLUTION INTRAVENOUS PRN
Status: CANCELLED | OUTPATIENT
Start: 2019-08-27

## 2019-08-27 RX ORDER — FLUOROURACIL 50 MG/ML
850 INJECTION, SOLUTION INTRAVENOUS ONCE
Status: CANCELLED | OUTPATIENT
Start: 2019-08-27

## 2019-08-27 RX ORDER — ATROPINE SULFATE 0.4 MG/ML
0.4 AMPUL (ML) INJECTION ONCE
Status: COMPLETED | OUTPATIENT
Start: 2019-08-27 | End: 2019-08-27

## 2019-08-27 RX ORDER — SODIUM CHLORIDE 0.9 % (FLUSH) 0.9 %
10 SYRINGE (ML) INJECTION PRN
Status: CANCELLED | OUTPATIENT
Start: 2019-08-27

## 2019-08-27 RX ORDER — METHYLPREDNISOLONE SODIUM SUCCINATE 125 MG/2ML
125 INJECTION, POWDER, LYOPHILIZED, FOR SOLUTION INTRAMUSCULAR; INTRAVENOUS ONCE
Status: CANCELLED | OUTPATIENT
Start: 2019-08-27

## 2019-08-27 RX ORDER — DIPHENHYDRAMINE HYDROCHLORIDE 50 MG/ML
50 INJECTION INTRAMUSCULAR; INTRAVENOUS ONCE
Status: CANCELLED | OUTPATIENT
Start: 2019-08-27

## 2019-08-27 RX ORDER — HEPARIN SODIUM (PORCINE) LOCK FLUSH IV SOLN 100 UNIT/ML 100 UNIT/ML
500 SOLUTION INTRAVENOUS PRN
Status: DISCONTINUED | OUTPATIENT
Start: 2019-08-27 | End: 2019-08-28 | Stop reason: HOSPADM

## 2019-08-27 RX ORDER — SODIUM CHLORIDE 9 MG/ML
250 INJECTION, SOLUTION INTRAVENOUS CONTINUOUS
Status: DISCONTINUED | OUTPATIENT
Start: 2019-08-27 | End: 2019-08-28 | Stop reason: HOSPADM

## 2019-08-27 RX ORDER — FLUOROURACIL 50 MG/ML
850 INJECTION, SOLUTION INTRAVENOUS ONCE
Status: COMPLETED | OUTPATIENT
Start: 2019-08-27 | End: 2019-08-27

## 2019-08-27 RX ORDER — SODIUM CHLORIDE 0.9 % (FLUSH) 0.9 %
10 SYRINGE (ML) INJECTION PRN
Status: DISCONTINUED | OUTPATIENT
Start: 2019-08-27 | End: 2019-08-28 | Stop reason: HOSPADM

## 2019-08-27 RX ORDER — 0.9 % SODIUM CHLORIDE 0.9 %
10 VIAL (ML) INJECTION ONCE
Status: CANCELLED | OUTPATIENT
Start: 2019-08-27

## 2019-08-27 RX ORDER — PALONOSETRON HYDROCHLORIDE 0.05 MG/ML
0.25 INJECTION, SOLUTION INTRAVENOUS ONCE
Status: COMPLETED | OUTPATIENT
Start: 2019-08-27 | End: 2019-08-27

## 2019-08-27 RX ORDER — SODIUM CHLORIDE 9 MG/ML
250 INJECTION, SOLUTION INTRAVENOUS CONTINUOUS
Status: CANCELLED | OUTPATIENT
Start: 2019-08-27

## 2019-08-27 RX ORDER — SODIUM CHLORIDE 9 MG/ML
INJECTION, SOLUTION INTRAVENOUS CONTINUOUS
Status: CANCELLED | OUTPATIENT
Start: 2019-08-27

## 2019-08-27 RX ORDER — DEXAMETHASONE SODIUM PHOSPHATE 10 MG/ML
10 INJECTION INTRAMUSCULAR; INTRAVENOUS ONCE
Status: COMPLETED | OUTPATIENT
Start: 2019-08-27 | End: 2019-08-27

## 2019-08-27 RX ORDER — DEXAMETHASONE SODIUM PHOSPHATE 10 MG/ML
10 INJECTION, SOLUTION INTRAMUSCULAR; INTRAVENOUS ONCE
Status: CANCELLED | OUTPATIENT
Start: 2019-08-27

## 2019-08-27 RX ORDER — PALONOSETRON HYDROCHLORIDE 0.05 MG/ML
0.25 INJECTION, SOLUTION INTRAVENOUS ONCE
Status: CANCELLED | OUTPATIENT
Start: 2019-08-27

## 2019-08-27 RX ORDER — EPINEPHRINE 1 MG/ML
0.3 INJECTION, SOLUTION, CONCENTRATE INTRAVENOUS PRN
Status: CANCELLED | OUTPATIENT
Start: 2019-08-27

## 2019-08-27 RX ADMIN — ATROPINE SULFATE 0.4 MG: 0.4 INJECTION, SOLUTION INTRAMUSCULAR; INTRAVENOUS; SUBCUTANEOUS at 09:07

## 2019-08-27 RX ADMIN — DEXAMETHASONE SODIUM PHOSPHATE 10 MG: 10 INJECTION INTRAMUSCULAR; INTRAVENOUS at 09:09

## 2019-08-27 RX ADMIN — SODIUM CHLORIDE 250 ML: 9 INJECTION, SOLUTION INTRAVENOUS at 09:04

## 2019-08-27 RX ADMIN — DENOSUMAB 120 MG: 120 INJECTION SUBCUTANEOUS at 09:11

## 2019-08-27 RX ADMIN — BEVACIZUMAB 400 MG: 400 INJECTION, SOLUTION INTRAVENOUS at 09:25

## 2019-08-27 RX ADMIN — LEUCOVORIN CALCIUM 850 MG: 10 INJECTION INTRAMUSCULAR; INTRAVENOUS at 10:10

## 2019-08-27 RX ADMIN — Medication 500 UNITS: at 12:34

## 2019-08-27 RX ADMIN — PALONOSETRON 0.25 MG: 0.25 INJECTION, SOLUTION INTRAVENOUS at 09:06

## 2019-08-27 RX ADMIN — FLUOROURACIL 850 MG: 50 INJECTION, SOLUTION INTRAVENOUS at 12:31

## 2019-08-27 RX ADMIN — SODIUM CHLORIDE 400 MG: 9 INJECTION, SOLUTION INTRAVENOUS at 10:10

## 2019-08-27 RX ADMIN — Medication 10 ML: at 12:34

## 2019-08-27 NOTE — PROGRESS NOTES
Patient presents to clinic today for Xgeva injection. Patient's labs monitored, specifically, Calcium level, to ensure no increased risk of hypocalcemia with administration of the medication. Patient's calcium level is 9.3 mg/dL. Patient received therapy and will continue to be monitored prior to each dose.     Jose Alejandro Cristina, [address] 8/27/2019 8:54 AM

## 2019-08-27 NOTE — PROGRESS NOTES
3488. Bone scan on 11/10/2015 noted no metastatic disease. CT chest on 11/10/2015 revealed Multiple pulmonary nodules in the upper and lower lobes consistent with metastatic disease;   Hepatic metastasis also visualized. 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 Mediport was placed. Cycle # 1 of FOLFOX + Avastin was on 11/23/2015. CEA was 4555 on 11/23/2015. Cycle # 2 of FOLFOX + Avastin was on 12/08/2015. CEA was 3160 on 12/08/2015. Cycle # 3 of FOLFOX + Avastin was on 12/22/2015. CEA was 2516 on 12/21/2015. Cycle # 4 of FOLFOX + Avastin was on 01/05/2016. CEA was 2098 on 01/05/2015. Cycle # 5 of FOLFOX + Avastin was on 01/19/2016. CEA was 1511 on 01/05/2015.     -Bone scan on 01/26/2016 noted no metastatic disease. CT chest on 01/26/2016 revealed Significant response to treatment with no visible residual nodules. CT scan abdomen/pelvis on 01/26/2016 noted Interval decreased size of multiple masses in the liver compatible with treatment response. Continue another 2 months of FOLFOX + Avastin and repeat scans. Cycle # 6 of FOLFOX + Avastin was on 02/02/2016.  on 02/02/2016. Cycle # 7 of FOLFOX + Avastin was on 02/16/2016.  on 02/16/2016. Cycle # 8 of FOLFOX + Avastin was on 03/01/2016.  on 03/01/2016. Cycle # 9 of FOLFOX + Avastin was on 03/15/2016.  on 03/15/2016. Cycle # 10 of FOLFOX + Avastin was on 03/29/2016. .4 on 03/29/2016. Cycle # 11 of FOLFOX + Avastin was on 04/12/2016. .4 on 04/12/2016.     Re-staging scans on 04/19/2016: CT Chest: clear lungs; no evidence of recurring pulmonary nodule; CT Abdomen/Pelvis: Further interval decrease in size of the multiple metastatic hepatic lesions, the largest lesion now measures 3.9 x 3.5 cm and previously measured 4.5 cm in maximum diameter. No mesenteric lymphadenopathy is identified; Bone Scan: No evidence of osseous metastasis.   Continue same regimen and re-stage in 2-3 months. Cycle # 12 FOLFOX + Avastin was on 04/26/2016. .5 on 04/26/2016. Cycle # 13 FOLFOX + Avastin was on 05/10/2016. .3 on 05/10/2016. Cycle # 14 FOLFOX+AVASTIN was on 05/24/2016. .5 on 05/24/2016. Cycle # 15 FOLFOX + Avastin was on 06/07/2016. CEA 90.5 on 06/07/2016. Admitted to [facility] 06/13/2016-06/16/2016 for abdominal pain: EGD noted 1.5 cm clean based duodenal bulb ulceration s/p epinephrine and bicap per Dr. Lauri Amin. No active bleeding. A. Stomach, biopsy: Mild chronic gastritis, immunostain negative for Helicobacter  B. Esophagus, biopsy: Gastric glandular mucosa with prominent intestinal metaplasia ([place]'s epithelium), negative for epithelial dysplasia, esophageal squamous mucosa not identified  Cycle # 16 FOLFOX (discontinued avastin (bevacizumab) given association of peptic ulcer disease and known association of GI perforation) was on 06/21/2016. Cycle # 17 FOLFOX was on 07/05/2016. CEA 52.6 on 07/19/2016. Cycle # 17 FOLFOX was on 07/05/2016. CEA 52.6 on 07/05/2016. CEA 47.6 on 07/19/2016.     Re-staging scans 07/19/2106: CT Chest negative for metastatic disease. CT Abdomen/Pelvis: Stable hepatic lesions; Question new mural thickening in the cecum. Bone Scan: New Let hip lesion suspicious for bone metastasis.     Increased CEA; new mural thickening in the cecum and new left hip lesion suspicious for bone metastasis are consistent with disease progression; He derived maximum benefit from FOLFOX/AVASTIN. D/C FOLFOX/AVASTIN. We recommended FOLFIRI second line therapy. Cycle # 1 FOLFIRI was on 08/02/2016. CEA 40.8 on 08/02/2016. Xgeva q4 weeks started on 08/02/2016. Cycle # 2 FOLFIRI was on 08/16/2016. CEA 31.7 on 08/16/2016. Cycle # 3 FOLFIRI (added Avastin) was on 08/30/2016 given that ulcers healed on EGD 08/15/2016 by Dr. Santiago Diez; Protonix bid since avastin re-started. Colonoscopy on 08/29/2016 (to look at the cecal area) unremarkable.  CEA 26.7 on 08/30/2016. Cycle # 4 FOLFIRI/Avastin was on 09/13/2016. CEA 23.4 on 09/13/2016. Cycle # 5 FOLFIRI/Avastin was on 09/27/2016. CEA 22.3 on 09/27/2016. Cycle # 6 FOLFIRI/Avastin was on 10/11/2016. CEA 18.4 on 10/11/2016.      Bone scan 10/21/2016 stable bone metastasis; ? New lesion anterior left sixth rib likely interval healing fracture. CT abdomen/pelvis revealed stable hepatic metastasis. CT chest negative for metastatic disease. Continue FOLFIRI/Avastin and repeat scans in 3 months. Cycle # 7 FOLFIRI/Avastin was on 10/25/2016. CEA 16.1  Cycle # 8 FOLFIRI/Avastin was on 11/08/2016. CEA 15.7  Cycle # 9 FOLFIRI/Avastin was on 11/29/2016. CEA 13.6 on 11/29/2016. Cycle # 10 FOLFIRI/Avastin was on 12/13/2016. CEA 10.6 on 12/13/2016. Cycle # 11 FOLFIRI/Avastin was on 12/27/2016. CEA 11.1 on 12/27/2016. Cycle # 12 FOLFIRI/Avastin was on 01/10/2017. CEA 9.7 on 01/10/2017.       CT chest 01/23/2017 No new pulmonary nodule or lymphadenopathy. Patchy groundglass opacities within the left lower lobe, suggestive of infectious or inflammatory etiology. Followup CT chest in 3 months. Slight increased linear sclerosis along the left anterior sixth rib corresponding to an area of increased uptake on the prior bone scan from 10/21/2016, favored to be related to interval healing changes of a nondisplaced fracture. CT abdomen/pelvis on 01/23/2017 Redemonstration of multiple hepatic metastases, which are similar compared to the prior CT from 10/21/2016. Redemonstration of area of increased sclerosis along the right acetabulum suspicious for metastatic disease. Bone scan on 01/23/2017 Stable subtle uptake within the left proximal diaphysis, again suggestive of metastatic disease  Decreased subtle uptake within the medial right acetabulum. Decreased uptake within the left anterior sixth rib corresponding to linear sclerosis on the recent CT, favored to be related to an joint rib fracture.     Continue FOLFIRI + Avastin and repeat scans in 3 months. Cycle # 13 FOLFIRI + Avastin was on 01/24/2017. Cycle # 14 FOLFIRI + Avastin was on 02/07/2017. Cycle # 15 FOLFIRI + Avastin was on 02/21/2017. Cycle # 16 FOLFIRI + Avastin was on 03/07/2017. Cycle # 17 FOLFIRI + Avastin was on 03/21/2017. Cycle # 18 FOLFIRI + Avastin was on 04/04/2017. CT chest 04/17/2017 negative for metastatic disease. CT abdomen/pelvis 04/17/2017 Stable hypodense metastatic lesions within the liver. Stable area of increased sclerosis along the right acetabulum  Bone scan 04/17/2017 Stable subtle uptake within the left proximal femoral diaphysis; No definite abnormal uptake in the region of a sclerotic lesion along the posterior column of the right acetabulum noted on CT. Stable slight uptake within the left anterior sixth rib corresponding to linear sclerosis on the recent CT, favored to be related to a fracture. Continue FOLFIRI + Avastin and repeat scans in 3 months. Cycle # 19 FOLFIRI + Avastin was on 04/18/2017. Cycle # 20 FOLFIRI + Avastin was on 05/02/2017. Cycle # 21 FOLFIRI + Avastin was on 05/16/2017. Cycle # 22 FOLFIRI + Avastin was on 05/30/2017. Cycle # 23 FOLFIRI + Avastin was on 06/13/2017. Cycle # 24 FOLFIRI + Avastin was on 06/27/2017. Cycle # 25 FOLFIRI + Avastin was on 07/11/2017. Cycle # 26 FOLFIRI + Avastin was on 07/25/2017. Cycle # 27 FOLFIRI + Avastin was on 08/08/2017. Cycle # 28 FOLFIRI + Avastin was on 08/22/2017. Cycle # 29 FOLFIRI + Avastin was on 09/05/2017. Cycle # 30 FOLFIRI + Avastin was on 09/19/2017. Bone scan 09/26/2017 stable. CT abdomen/pelvis on 09/26/2017 Stable hypodense mass in the liver. Stable sclerotic lesion in the acetabulum. CT chest 09/26/2017 negative for metastatic disease. Cycle # 31 FOLFIRI + Avastin was on 10/03/2017. CEA 7.4 on 10/03/2017. Cycle # 32 FOLFIRI + Avastin was on 10/17/2017. CEA 6.0 on 10/17/2017. Cycle # 33 FOLFIRI + Avastin was on 10/31/2017.  CEA 6.8 on 10/31/2017. Cycle # 34 FOLFIRI + Avastin was on 11/14/2017. CEA 7.2 on 11/14/2017. Cycle # 35 FOLFIRI + Avastin was on 11/28/2017. CEA 5.1 on 11/28/2017. Cycle # 36 FOLFIRI + Avastin was on 12/12/2017. CEA 6.1 on 12/12/2017. Bone scan 12/22/2017 noted no evidence of metastatic disease to the axial or appendicular skeleton. CT chest 12/22/2017 noted no evidence of metastatic disease. CT abdomen/pelvis 12/22/2017 noted stable hypodense lesions in the liver. Continue FOLFIRI + Avastin and repeat scans in 3 months. Cycle # 37 FOLFIRI + Avastin was on 12/27/2018. Cycle # 38 FOLFIRI + Avastin was on 01/09/2018. Cycle # 39 FOLFIRI + Avastin was on 01/23/2018. Cycle # 40 FOLFIRI + Avastin was on 02/06/2018. Cycle # 41 FOLFIRI + Avastin was on 02/20/2018. Cycle # 42 FOLFIRI + Avastin was on 03/06/2018. Cycle # 43 FOLFIRI + Avastin was on 03/20/2018. Bone scan on 03/26/2018 negative for metastatic disease. CT chest 03/26/2018 noted ? new 7 mm nodule in LUCY. CT abdomen/pelvis 03/26/2018 noted stable hypodense lesions in the liver. ? New small ascites in the abdomen. Patient wants to continue to monitor the lung finding and repeat scans in 2 months instead of changing the current regimen into oral Lonsurf. Cycle # 44 FOLFIRI + Avastin was on 04/03/2018. CEA 6.3 on 04/03/2018. Cycle # 45 FOLFIRI + Avastin was on 04/24/2018. CEA 5.3 on 04/24/2018. Cycle # 46 FOLFIRI + Avastin was on 05/08/2018. CEA 5.4 on 05/08/2018. Cycle # 47 FOLFIRI + Avastin was on 05/22/2018. CEA 6.1 on 05/22/2018. CT chest 05/31/2018 noted stable nodule in LUCY. No evidence of worsening malignancy. CT abdomen/pelvis on 05/31/2018 noted stable liver metastasis. No evidence of worsening malignancy. Continue FOLFIRI + Avastin and repeat scans in 3 months. Cycle # 48 FOLFIRI + Avastin was on 06/06/2018. Cycle # 49 FOLFIRI + Avastin was on 06/19/2018. Cycle # 50 FOLFIRI + Avastin was on 07/03/2018.   Cycle # 51 FOLFIRI + Avastin was on 07/24/2018. Cycle # 52 FOLFIRI + Avastin was on 08/14/2018. Cycle # 53 FOLFIRI + Avastin was on 08/28/2018. CT chest 09/06/2018 noted interval decrease in size of LUCY measuring up to 4 mm, suggestive of treatment response. No mediastinal or hilar LN  CT abdomen/pelvis 09/06/2018 Slight interval increase in size of a previously noted metastatic lesion within the right hepatic lobe. This may be related to differences in phase of enhancement compared to the most recent study from 5/31/2018, the lesion remains decreased in size compared to the more previous studies. No abdominal, retroperitoneal, or pelvic lymphadenopathy. Continue FOLFIRI + Avastin and repeat scans in 2 months. Cycle # 54 FOLFIRI + Avastin was on 09/11/2018. Cycle # 55 FOLFIRI + Avastin was on 09/25/2018. Cycle # 56 FOLFIRI + Avastin was on 10/09/2018. Cycle # 57 FOLFIRI + Avastin was on 10/23/2018. CT chest 11/02/2018 stable 3 mm nodule within LUCY. No new right and left lung nodule. No mediastinal or osseous lesion. CT abdomen/pelvis 11/02/2018 stable metastatic disease to liver. No new metastatic disease identified. No mesenteric or retroperitoneal LN. No gross colonic lesion identified. Continue FOLFIRI + Avastin and repeat scans in 3 months. Cycle # 58 FOLFIRI + Avastin was on 11/06/2018. CEA 7.6 on 11/05/2018. Cycle # 59 FOLFIRI + Avastin was on 11/27/2018. CEA 6.9 on 11/26/2018. Cycle # 60 FOLFIRI + Avastin was on 12/11/2018. CEA 6.7 on 12/11/2018. Cycle # 61 FOLFIRI + Avastin was on 01/08/2019. CEA 5.6 on 01/07/2019. Cycle # 62 FOLFIRI + Avastin was on 01/29/2019. CEA 4.6 on 01/28/2019. Cycle # 63 FOLFIRI + Avastin was on 02/12/2019. CEA 4.3 on 02/11/2019. CT chest 02/21/2019 no evidence of metastatic disease. CT abdomen/pelvis 02/21/2019 stable hypodense liver lesions. No evidence of worsening metastatic disease. 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  Ordered MRI thoracic spine and referred to neurosurgery team.    Cycle # 64 FOLFIRI + Avastin was on 02/26/2019. CEA 4.7 on 02/25/2019. Cycle # 65 FOLFIRI + Avastin was on 03/12/2019. CEA 4.0 on 03/11/2019. Cycle # 66 FOLFIRI + Avastin was on 03/26/2019. CEA 4.7 on 03/25/2019. Cycle # 67 FOLFIRI + Avastin was on 04/09/2019. CEA 5.6 on 04/08/2019. Cycle # 68 FOLFIRI + Avastin was on 04/30/2019. CEA 4.9 on 04/29/2019. Cycle # 69 FOLFIRI + Avastin was on 05/14/2019. CEA 5.3 on 05/14/2019. CT chest 05/23/2019 stable small lung nodule with no evidence of metastatic disease. CT abdomen/pelvis 05/23/2019 Decrease conspicuity of the liver lesions. No new lesions seen. Stable splenomegaly. Continue FOLFIRI + Avastin and repeat scans in 3 months. Cycle # 70 FOLFIRI + Avastin was on 06/04/2019. CEA 4.8 on 06/03/2019. Cycle # 71 FOLFIRI + Avastin was on 06/18/2019. CEA 4.6 on 06/17/2019. Cycle # 72 FOLFIRI + Avastin was on 07/09/2019. CEA 4.3 on 07/08/2019. Cycle # 73 FOLFIRI + Avastin was on 07/30/2019. CEA 5.0 on 07/29/2019. Cycle # 74 FOLFIRI + Avastin was on 08/13/2019. CEA 5.0 on 08/12/2019. Review of Systems;  CONSTITUTIONAL: No fever, chills. Fair appetite and energy level. ENMT: Eyes: no diplopia; Nose: No epistaxis. Mouth: No oral lesions. RESPIRATORY: No hemoptysis, shortness of breath, cough. CARDIOVASCULAR: No chest pain, palpitations. GASTROINTESTINAL: No nausea/vomiting, abdominal pain. Intermittent diarrhea. GENITOURINARY: No dysuria, urinary frequency, hematuria. NEURO: No syncope, presyncope, headache. Remainder ROS: Negative. Past Medical History:   Past Medical History               Diagnosis Date    Arthritis      Cancer ([address]         colon    Depression      Hyperlipidemia      Hypertension           Medications:  Reviewed and reconciled.     Allergies:        Allergies   Allergen Reactions    Neosporin [Neomycin-Polymyxin-Gramicidin] Rash    Tape Heraclio [place] Tape] Rash FOLFOX + Avastin and repeat scans. Cycle # 6 of FOLFOX + Avastin was on 02/02/2016.  on 02/02/2016. Cycle # 7 of FOLFOX + Avastin was on 02/16/2016.  on 02/16/2016. Cycle # 8 of FOLFOX + Avastin was on 03/01/2016.  on 03/01/2016. Cycle # 9 of FOLFOX + Avastin was on 03/15/2016.  on 03/15/2016. Cycle # 10 of FOLFOX + Avastin was on 03/29/2016. .4 on 03/29/2016. Cycle # 11 of FOLFOX + Avastin was on 04/12/2016. .4 on 04/12/2016.     Re-staging scans on 04/19/2016: CT Chest: clear lungs; no evidence of recurring pulmonary nodule; CT Abdomen/Pelvis: Further interval decrease in size of the multiple metastatic hepatic lesions, the largest lesion now measures 3.9 x 3.5 cm and previously measured 4.5 cm in maximum diameter. No mesenteric lymphadenopathy is identified; Bone Scan: No evidence of osseous metastasis. Continue same regimen and re-stage in 2-3 months. Cycle # 12 FOLFOX + Avastin was on 04/26/2016. .5 on 04/26/2016. Cycle # 13 FOLFOX + Avastin was on 05/10/2016. .3 on 05/10/2016. Cycle # 14 FOLFOX+AVASTIN was on 05/24/2016. .5 on 05/24/2016. Cycle # 15 FOLFOX + Avastin was on 06/07/2016. CEA 90.5 on 06/07/2016. Admitted to [facility] 06/13/2016-06/16/2016 for abdominal pain: EGD noted 1.5 cm clean based duodenal bulb ulceration s/p epinephrine and bicap per Dr. Brice Brewster. No active bleeding. A. Stomach, biopsy: Mild chronic gastritis, immunostain negative for Helicobacter  B. Esophagus, biopsy: Gastric glandular mucosa with prominent ntestinal metaplasia ([place]'s epithelium), negative for epithelial dysplasia, esophageal squamous mucosa not identified     Cycle # 16 FOLFOX (discontinued avastin (bevacizumab) given association of peptic ulcer disease and known association of GI perforation.) was on 06/21/2016. CEA 47 on 06/21/2016. Cycle # 17 FOLFOX was on 07/05/2016. CEA 52.6 on 07/05/2016.  CEA 47.6 on 07/19/2016.     Re-staging scans Avastin and repeat scans in 3 months. Cycle # 37 FOLFIRI + Avastin was on 12/27/2018. CEA 6.7 on 12/27/2017. Cycle # 38 FOLFIRI + Avastin was on 01/09/2018. CEA 5.0 on 01/09/2018. Cycle # 39 FOLFIRI + Avastin was on 01/23/2018. CEA 6.5 on 01/23/2018. Cycle # 40 FOLFIRI + Avastin was on 02/06/2018. CEA 6.9 on 02/06/2018. Cycle # 41 FOLFIRI + Avastin was on 02/20/2018. CEA 6.4 on 02/20/2018. Cycle # 42 FOLFIRI + Avastin was on 03/06/2018. CEA 6.6 on 03/06/2018. Cycle # 43 FOLFIRI + Avastin was on 03/20/2018. CEA 5.7 on 03/20/2018. Bone scan on 03/26/2018 negative for metastatic disease. CT chest 03/26/2018 noted ? new 7 mm nodule in LUCY. CT abdomen/pelvis 03/26/2018 noted stable hypodense lesions in the liver. ? New small ascites in the abdomen. Patient wants to continue to monitor the lung finding and repeat scans in 2 months instead of changing the current chemotherapy regimen to oral Lonsurf. Cycle # 44 FOLFIRI + Avastin was on 04/03/2018. CEA 6.3 on 04/03/2018. Cycle # 45 FOLFIRI + Avastin was on 04/24/2018. CEA 5.3 on 04/24/2018. Cycle # 46 FOLFIRI + Avastin was on 05/08/2018. CEA 5.4 on 05/08/2018. Cycle # 47 FOLFIRI + Avastin was on 05/22/2018. CEA 6.1 on 05/22/2018. CT chest 05/31/2018 noted stable nodule in LUCY. No evidence of worsening malignancy. CT abdomen/pelvis on 05/31/2018 noted stable liver metastasis. No evidence of worsening malignancy. Continue FOLFIRI + Avastin and repeat scans in 3 months. Cycle # 48 FOLFIRI + Avastin was on 06/06/2018. CEA 6.3 on 06/05/2018. Cycle # 49 FOLFIRI + Avastin was on 06/19/2018. CEA 6.1 on 06/19/2018. Cycle # 50 FOLFIRI + Avastin was on 07/03/2018. CEA 7.4 on 07/03/2018. Cycle # 51 FOLFIRI + Avastin was on 07/24/2018. CEA 6.7 on 07/24/2018. Cycle # 52 FOLFIRI + Avastin was on 08/14/2018. CEA 6.8 on 08/14/2018. Cycle # 53 FOLFIRI + Avastin was on 08/28/2018. CEA 6.5 on 08/27/2018.   CT chest 09/06/2018 noted interval decrease in size of LUCY measuring up to 4 mm, suggestive of treatment response. No mediastinal or hilar LN  CT abdomen/pelvis 09/06/2018 Slight interval increase in size of a previously noted metastatic lesion within the right hepatic lobe. This may be related to differences in phase of enhancement compared to the most recent study from 5/31/2018, the lesion remains decreased in size compared to the more previous studies. No abdominal, retroperitoneal, or pelvic lymphadenopathy. Continue FOLFIRI + Avastin and repeat scans in 2 months. Cycle # 54 FOLFIRI + Avastin was on 09/11/2018. CEA 6.4 on 09/10/2018. Cycle # 55 FOLFIRI + Avastin was on 09/25/2018. CEA 6.4 on 09/25/2018. Cycle # 56 FOLFIRI + Avastin was on 10/09/2018. CEA 6.7 on 10/08/2018. Cycle # 57 FOLFIRI + Avastin was on 10/23/2018. CEA 7.2 on 10/22/2018. CT chest 11/02/2018 stable 3 mm nodule within LUCY. No new right and left lung nodule. No mediastinal or osseous lesion. CT abdomen/pelvis 11/02/2018 stable metastatic disease to liver. No new metastatic disease identified. No mesenteric or retroperitoneal LN. No gross colonic lesion identified. Continue FOLFIRI + Avastin and repeat scans in 3 months. Cycle # 58 FOLFIRI + Avastin was on 11/06/2018. CEA 7.6 on 11/05/2018. Cycle # 59 FOLFIRI + Avastin was on 11/27/2018. CEA 6.9 on 11/26/2018. Cycle # 60 FOLFIRI + Avastin was on 12/11/2018. CEA 6.7 on 12/11/2018. Cycle # 61 FOLFIRI + Avastin was on 01/08/2019. CEA 5.6 on 01/07/2019. Cycle # 62 FOLFIRI + Avastin was on 01/29/2019. CEA 4.6 on 01/28/2019. Cycle # 63 FOLFIRI + Avastin was on 02/12/2019. CEA 4.3 on 02/11/2019. CT chest 02/21/2019 no evidence of metastatic disease. CT abdomen/pelvis 02/21/2019 stable hypodense liver lesions. No evidence of worsening metastatic disease. 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  Ordered MRI thoracic spine and referred to neurosurgery team.  Cycle # 64 FOLFIRI + Avastin was on

## 2019-08-29 ENCOUNTER — HOSPITAL ENCOUNTER (OUTPATIENT)
Dept: INFUSION THERAPY | Age: 69
Discharge: HOME | End: 2019-08-29
Payer: MEDICARE

## 2019-08-29 DIAGNOSIS — C78.7 RECTAL CANCER METASTASIZED TO LIVER (HCC): Primary | ICD-10-CM

## 2019-08-29 DIAGNOSIS — C20 RECTAL CANCER METASTASIZED TO LIVER (HCC): Primary | ICD-10-CM

## 2019-08-29 PROCEDURE — 96372 THER/PROPH/DIAG INJ SC/IM: CPT

## 2019-08-29 PROCEDURE — 6360000002 HC RX W HCPCS: Performed by: INTERNAL MEDICINE

## 2019-08-29 RX ADMIN — PEGFILGRASTIM 6 MG: 6 INJECTION SUBCUTANEOUS at 14:54

## 2019-09-09 ENCOUNTER — TELEPHONE (OUTPATIENT)
Dept: INFUSION THERAPY | Age: 69
End: 2019-09-09

## 2019-09-09 ENCOUNTER — HOSPITAL ENCOUNTER (OUTPATIENT)
Age: 69
Discharge: HOME | End: 2019-09-09
Payer: MEDICARE

## 2019-09-09 DIAGNOSIS — C20 RECTAL CANCER METASTASIZED TO LIVER (HCC): ICD-10-CM

## 2019-09-09 DIAGNOSIS — C79.51 BONE METASTASIS (HCC): ICD-10-CM

## 2019-09-09 DIAGNOSIS — C78.7 RECTAL CANCER METASTASIZED TO LIVER (HCC): ICD-10-CM

## 2019-09-09 LAB
ALBUMIN SERPL-MCNC: 3.4 G/DL (ref 3.5–5.2)
ALP BLD-CCNC: 130 U/L (ref 40–129)
ALT SERPL-CCNC: 16 U/L (ref 0–40)
ANION GAP SERPL CALCULATED.3IONS-SCNC: 12 MMOL/L (ref 7–16)
ANISOCYTOSIS: ABNORMAL
AST SERPL-CCNC: 22 U/L (ref 0–39)
BASOPHILS ABSOLUTE: 0.01 E9/L (ref 0–0.2)
BASOPHILS RELATIVE PERCENT: 1 % (ref 0–2)
BILIRUB SERPL-MCNC: 0.8 MG/DL (ref 0–1.2)
BUN BLDV-MCNC: 13 MG/DL (ref 8–23)
CALCIUM SERPL-MCNC: 8.5 MG/DL (ref 8.6–10.2)
CEA: 5.3 NG/ML (ref 0–5.2)
CHLORIDE BLD-SCNC: 104 MMOL/L (ref 98–107)
CO2: 24 MMOL/L (ref 22–29)
CREAT SERPL-MCNC: 1 MG/DL (ref 0.7–1.2)
EOSINOPHILS ABSOLUTE: 0 E9/L (ref 0.05–0.5)
EOSINOPHILS RELATIVE PERCENT: 0 % (ref 0–6)
GFR AFRICAN AMERICAN: >60
GFR NON-AFRICAN AMERICAN: >60 ML/MIN/1.73
GLUCOSE BLD-MCNC: 121 MG/DL (ref 74–99)
HCT VFR BLD CALC: 23.8 % (ref 37–54)
HEMOGLOBIN: 7.6 G/DL (ref 12.5–16.5)
LYMPHOCYTES ABSOLUTE: 0.49 E9/L (ref 1.5–4)
LYMPHOCYTES RELATIVE PERCENT: 41 % (ref 20–42)
MCH RBC QN AUTO: 31.8 PG (ref 26–35)
MCHC RBC AUTO-ENTMCNC: 31.9 % (ref 32–34.5)
MCV RBC AUTO: 99.6 FL (ref 80–99.9)
MONOCYTES ABSOLUTE: 0.08 E9/L (ref 0.1–0.95)
MONOCYTES RELATIVE PERCENT: 7 % (ref 2–12)
NEUTROPHILS ABSOLUTE: 0.61 E9/L (ref 1.8–7.3)
NEUTROPHILS RELATIVE PERCENT: 51 % (ref 43–80)
OVALOCYTES: ABNORMAL
PDW BLD-RTO: 17.4 FL (ref 11.5–15)
PLATELET # BLD: 83 E9/L (ref 130–450)
PLATELET CONFIRMATION: NORMAL
PMV BLD AUTO: 11 FL (ref 7–12)
POIKILOCYTES: ABNORMAL
POTASSIUM SERPL-SCNC: 3.4 MMOL/L (ref 3.5–5)
RBC # BLD: 2.39 E12/L (ref 3.8–5.8)
SODIUM BLD-SCNC: 140 MMOL/L (ref 132–146)
TEAR DROP CELLS: ABNORMAL
TOTAL PROTEIN: 6.2 G/DL (ref 6.4–8.3)
WBC # BLD: 1.2 E9/L (ref 4.5–11.5)

## 2019-09-09 PROCEDURE — 82378 CARCINOEMBRYONIC ANTIGEN: CPT

## 2019-09-09 PROCEDURE — 36415 COLL VENOUS BLD VENIPUNCTURE: CPT

## 2019-09-09 PROCEDURE — 80053 COMPREHEN METABOLIC PANEL: CPT

## 2019-09-09 PROCEDURE — 85025 COMPLETE CBC W/AUTO DIFF WBC: CPT

## 2019-09-16 ENCOUNTER — HOSPITAL ENCOUNTER (OUTPATIENT)
Age: 69
Discharge: HOME | End: 2019-09-16
Payer: MEDICARE

## 2019-09-16 DIAGNOSIS — C20 RECTAL CANCER METASTASIZED TO LIVER (HCC): ICD-10-CM

## 2019-09-16 DIAGNOSIS — C79.51 BONE METASTASIS (HCC): ICD-10-CM

## 2019-09-16 DIAGNOSIS — C78.7 RECTAL CANCER METASTASIZED TO LIVER (HCC): ICD-10-CM

## 2019-09-16 LAB
ALBUMIN SERPL-MCNC: 3.6 G/DL (ref 3.5–5.2)
ALP BLD-CCNC: 155 U/L (ref 40–129)
ALT SERPL-CCNC: 21 U/L (ref 0–40)
ANION GAP SERPL CALCULATED.3IONS-SCNC: 10 MMOL/L (ref 7–16)
ANISOCYTOSIS: ABNORMAL
AST SERPL-CCNC: 33 U/L (ref 0–39)
BASOPHILS ABSOLUTE: 0 E9/L (ref 0–0.2)
BASOPHILS RELATIVE PERCENT: 0.8 % (ref 0–2)
BILIRUB SERPL-MCNC: 0.7 MG/DL (ref 0–1.2)
BUN BLDV-MCNC: 14 MG/DL (ref 8–23)
CALCIUM SERPL-MCNC: 9.3 MG/DL (ref 8.6–10.2)
CEA: 5.5 NG/ML (ref 0–5.2)
CHLORIDE BLD-SCNC: 106 MMOL/L (ref 98–107)
CO2: 27 MMOL/L (ref 22–29)
CREAT SERPL-MCNC: 1.1 MG/DL (ref 0.7–1.2)
EOSINOPHILS ABSOLUTE: 0.07 E9/L (ref 0.05–0.5)
EOSINOPHILS RELATIVE PERCENT: 1.8 % (ref 0–6)
GFR AFRICAN AMERICAN: >60
GFR NON-AFRICAN AMERICAN: >60 ML/MIN/1.73
GLUCOSE BLD-MCNC: 141 MG/DL (ref 74–99)
HCT VFR BLD CALC: 29.2 % (ref 37–54)
HEMOGLOBIN: 9.2 G/DL (ref 12.5–16.5)
LYMPHOCYTES ABSOLUTE: 0.41 E9/L (ref 1.5–4)
LYMPHOCYTES RELATIVE PERCENT: 11 % (ref 20–42)
MCH RBC QN AUTO: 31.8 PG (ref 26–35)
MCHC RBC AUTO-ENTMCNC: 31.5 % (ref 32–34.5)
MCV RBC AUTO: 101 FL (ref 80–99.9)
METAMYELOCYTES RELATIVE PERCENT: 1.8 % (ref 0–1)
MONOCYTES ABSOLUTE: 0.22 E9/L (ref 0.1–0.95)
MONOCYTES RELATIVE PERCENT: 5.5 % (ref 2–12)
NEUTROPHILS ABSOLUTE: 3.03 E9/L (ref 1.8–7.3)
NEUTROPHILS RELATIVE PERCENT: 79.8 % (ref 43–80)
NUCLEATED RED BLOOD CELLS: 2.8 /100 WBC
OVALOCYTES: ABNORMAL
PDW BLD-RTO: 18.9 FL (ref 11.5–15)
PLATELET # BLD: 118 E9/L (ref 130–450)
PMV BLD AUTO: 10.9 FL (ref 7–12)
POIKILOCYTES: ABNORMAL
POLYCHROMASIA: ABNORMAL
POTASSIUM SERPL-SCNC: 4 MMOL/L (ref 3.5–5)
RBC # BLD: 2.89 E12/L (ref 3.8–5.8)
SODIUM BLD-SCNC: 143 MMOL/L (ref 132–146)
TEAR DROP CELLS: ABNORMAL
TOTAL PROTEIN: 6.1 G/DL (ref 6.4–8.3)
WBC # BLD: 3.7 E9/L (ref 4.5–11.5)

## 2019-09-16 PROCEDURE — 82378 CARCINOEMBRYONIC ANTIGEN: CPT

## 2019-09-16 PROCEDURE — 80053 COMPREHEN METABOLIC PANEL: CPT

## 2019-09-16 PROCEDURE — 36415 COLL VENOUS BLD VENIPUNCTURE: CPT

## 2019-09-16 PROCEDURE — 85025 COMPLETE CBC W/AUTO DIFF WBC: CPT

## 2019-09-17 ENCOUNTER — HOSPITAL ENCOUNTER (OUTPATIENT)
Dept: INFUSION THERAPY | Age: 69
Discharge: HOME | End: 2019-09-17
Payer: MEDICARE

## 2019-09-17 ENCOUNTER — OFFICE VISIT (OUTPATIENT)
Dept: ONCOLOGY | Age: 69
End: 2019-09-17
Payer: MEDICARE

## 2019-09-17 ENCOUNTER — TELEPHONE (OUTPATIENT)
Dept: INFUSION THERAPY | Age: 69
End: 2019-09-17

## 2019-09-17 VITALS — SYSTOLIC BLOOD PRESSURE: 123 MMHG | DIASTOLIC BLOOD PRESSURE: 70 MMHG | RESPIRATION RATE: 18 BRPM | HEART RATE: 54 BPM

## 2019-09-17 VITALS
WEIGHT: 203.2 LBS | DIASTOLIC BLOOD PRESSURE: 75 MMHG | HEIGHT: 73 IN | HEART RATE: 68 BPM | TEMPERATURE: 97.6 F | SYSTOLIC BLOOD PRESSURE: 138 MMHG | OXYGEN SATURATION: 99 % | BODY MASS INDEX: 26.93 KG/M2

## 2019-09-17 DIAGNOSIS — C20 RECTAL CANCER METASTASIZED TO LIVER (HCC): Primary | ICD-10-CM

## 2019-09-17 DIAGNOSIS — Z09 FOLLOW UP: Primary | ICD-10-CM

## 2019-09-17 DIAGNOSIS — C78.7 RECTAL CANCER METASTASIZED TO LIVER (HCC): Primary | ICD-10-CM

## 2019-09-17 PROCEDURE — 2580000003 HC RX 258: Performed by: INTERNAL MEDICINE

## 2019-09-17 PROCEDURE — 96368 THER/DIAG CONCURRENT INF: CPT

## 2019-09-17 PROCEDURE — 6360000002 HC RX W HCPCS: Performed by: INTERNAL MEDICINE

## 2019-09-17 PROCEDURE — 96411 CHEMO IV PUSH ADDL DRUG: CPT

## 2019-09-17 PROCEDURE — 96415 CHEMO IV INFUSION ADDL HR: CPT

## 2019-09-17 PROCEDURE — 96417 CHEMO IV INFUS EACH ADDL SEQ: CPT

## 2019-09-17 PROCEDURE — 99214 OFFICE O/P EST MOD 30 MIN: CPT | Performed by: INTERNAL MEDICINE

## 2019-09-17 PROCEDURE — 96413 CHEMO IV INFUSION 1 HR: CPT

## 2019-09-17 PROCEDURE — 96375 TX/PRO/DX INJ NEW DRUG ADDON: CPT

## 2019-09-17 RX ORDER — SODIUM CHLORIDE 0.9 % (FLUSH) 0.9 %
10 SYRINGE (ML) INJECTION PRN
Status: CANCELLED | OUTPATIENT
Start: 2019-09-17

## 2019-09-17 RX ORDER — 0.9 % SODIUM CHLORIDE 0.9 %
10 VIAL (ML) INJECTION ONCE
Status: CANCELLED | OUTPATIENT
Start: 2019-09-17

## 2019-09-17 RX ORDER — PALONOSETRON HYDROCHLORIDE 0.05 MG/ML
0.25 INJECTION, SOLUTION INTRAVENOUS ONCE
Status: COMPLETED | OUTPATIENT
Start: 2019-09-17 | End: 2019-09-17

## 2019-09-17 RX ORDER — PALONOSETRON HYDROCHLORIDE 0.05 MG/ML
0.25 INJECTION, SOLUTION INTRAVENOUS ONCE
Status: CANCELLED | OUTPATIENT
Start: 2019-09-17

## 2019-09-17 RX ORDER — SODIUM CHLORIDE 9 MG/ML
250 INJECTION, SOLUTION INTRAVENOUS CONTINUOUS
Status: DISCONTINUED | OUTPATIENT
Start: 2019-09-17 | End: 2019-09-18 | Stop reason: HOSPADM

## 2019-09-17 RX ORDER — FLUOROURACIL 50 MG/ML
850 INJECTION, SOLUTION INTRAVENOUS ONCE
Status: CANCELLED | OUTPATIENT
Start: 2019-09-17

## 2019-09-17 RX ORDER — SODIUM CHLORIDE 9 MG/ML
250 INJECTION, SOLUTION INTRAVENOUS CONTINUOUS
Status: CANCELLED | OUTPATIENT
Start: 2019-09-17

## 2019-09-17 RX ORDER — HEPARIN SODIUM (PORCINE) LOCK FLUSH IV SOLN 100 UNIT/ML 100 UNIT/ML
500 SOLUTION INTRAVENOUS PRN
Status: CANCELLED | OUTPATIENT
Start: 2019-09-17

## 2019-09-17 RX ORDER — DIPHENHYDRAMINE HYDROCHLORIDE 50 MG/ML
50 INJECTION INTRAMUSCULAR; INTRAVENOUS ONCE
Status: CANCELLED | OUTPATIENT
Start: 2019-09-17

## 2019-09-17 RX ORDER — ATROPINE SULFATE 0.4 MG/ML
0.4 AMPUL (ML) INJECTION ONCE
Status: COMPLETED | OUTPATIENT
Start: 2019-09-17 | End: 2019-09-17

## 2019-09-17 RX ORDER — FLUOROURACIL 50 MG/ML
850 INJECTION, SOLUTION INTRAVENOUS ONCE
Status: COMPLETED | OUTPATIENT
Start: 2019-09-17 | End: 2019-09-17

## 2019-09-17 RX ORDER — SODIUM CHLORIDE 0.9 % (FLUSH) 0.9 %
10 SYRINGE (ML) INJECTION PRN
Status: DISCONTINUED | OUTPATIENT
Start: 2019-09-17 | End: 2019-09-18 | Stop reason: HOSPADM

## 2019-09-17 RX ORDER — EPINEPHRINE 1 MG/ML
0.3 INJECTION, SOLUTION, CONCENTRATE INTRAVENOUS PRN
Status: CANCELLED | OUTPATIENT
Start: 2019-09-17

## 2019-09-17 RX ORDER — SODIUM CHLORIDE 9 MG/ML
INJECTION, SOLUTION INTRAVENOUS CONTINUOUS
Status: CANCELLED | OUTPATIENT
Start: 2019-09-17

## 2019-09-17 RX ORDER — HEPARIN SODIUM (PORCINE) LOCK FLUSH IV SOLN 100 UNIT/ML 100 UNIT/ML
500 SOLUTION INTRAVENOUS PRN
Status: DISCONTINUED | OUTPATIENT
Start: 2019-09-17 | End: 2019-09-18 | Stop reason: HOSPADM

## 2019-09-17 RX ORDER — ATROPINE SULFATE 0.4 MG/ML
0.4 AMPUL (ML) INJECTION ONCE
Status: CANCELLED | OUTPATIENT
Start: 2019-09-17

## 2019-09-17 RX ORDER — DEXAMETHASONE SODIUM PHOSPHATE 10 MG/ML
10 INJECTION INTRAMUSCULAR; INTRAVENOUS ONCE
Status: COMPLETED | OUTPATIENT
Start: 2019-09-17 | End: 2019-09-17

## 2019-09-17 RX ORDER — DEXAMETHASONE SODIUM PHOSPHATE 10 MG/ML
10 INJECTION, SOLUTION INTRAMUSCULAR; INTRAVENOUS ONCE
Status: CANCELLED | OUTPATIENT
Start: 2019-09-17

## 2019-09-17 RX ORDER — METHYLPREDNISOLONE SODIUM SUCCINATE 125 MG/2ML
125 INJECTION, POWDER, LYOPHILIZED, FOR SOLUTION INTRAMUSCULAR; INTRAVENOUS ONCE
Status: CANCELLED | OUTPATIENT
Start: 2019-09-17

## 2019-09-17 RX ADMIN — FLUOROURACIL 850 MG: 50 INJECTION, SOLUTION INTRAVENOUS at 13:43

## 2019-09-17 RX ADMIN — SODIUM CHLORIDE 250 ML: 9 INJECTION, SOLUTION INTRAVENOUS at 09:53

## 2019-09-17 RX ADMIN — LEUCOVORIN CALCIUM 850 MG: 10 INJECTION INTRAMUSCULAR; INTRAVENOUS at 11:12

## 2019-09-17 RX ADMIN — PALONOSETRON 0.25 MG: 0.25 INJECTION, SOLUTION INTRAVENOUS at 09:53

## 2019-09-17 RX ADMIN — SODIUM CHLORIDE 400 MG: 9 INJECTION, SOLUTION INTRAVENOUS at 11:12

## 2019-09-17 RX ADMIN — Medication 10 ML: at 13:44

## 2019-09-17 RX ADMIN — Medication 500 UNITS: at 13:44

## 2019-09-17 RX ADMIN — BEVACIZUMAB 400 MG: 400 INJECTION, SOLUTION INTRAVENOUS at 10:28

## 2019-09-17 RX ADMIN — DEXAMETHASONE SODIUM PHOSPHATE 10 MG: 10 INJECTION INTRAMUSCULAR; INTRAVENOUS at 09:53

## 2019-09-17 RX ADMIN — ATROPINE SULFATE 0.4 MG: 0.4 INJECTION, SOLUTION INTRAMUSCULAR; INTRAVENOUS; SUBCUTANEOUS at 09:53

## 2019-09-17 NOTE — PROGRESS NOTES
3488. Bone scan on 11/10/2015 noted no metastatic disease. CT chest on 11/10/2015 revealed Multiple pulmonary nodules in the upper and lower lobes consistent with metastatic disease;   Hepatic metastasis also visualized. 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 Mediport was placed. Cycle # 1 of FOLFOX + Avastin was on 11/23/2015. CEA was 4555 on 11/23/2015. Cycle # 2 of FOLFOX + Avastin was on 12/08/2015. CEA was 3160 on 12/08/2015. Cycle # 3 of FOLFOX + Avastin was on 12/22/2015. CEA was 2516 on 12/21/2015. Cycle # 4 of FOLFOX + Avastin was on 01/05/2016. CEA was 2098 on 01/05/2015. Cycle # 5 of FOLFOX + Avastin was on 01/19/2016. CEA was 1511 on 01/05/2015.     -Bone scan on 01/26/2016 noted no metastatic disease. CT chest on 01/26/2016 revealed Significant response to treatment with no visible residual nodules. CT scan abdomen/pelvis on 01/26/2016 noted Interval decreased size of multiple masses in the liver compatible with treatment response. Continue another 2 months of FOLFOX + Avastin and repeat scans. Cycle # 6 of FOLFOX + Avastin was on 02/02/2016.  on 02/02/2016. Cycle # 7 of FOLFOX + Avastin was on 02/16/2016.  on 02/16/2016. Cycle # 8 of FOLFOX + Avastin was on 03/01/2016.  on 03/01/2016. Cycle # 9 of FOLFOX + Avastin was on 03/15/2016.  on 03/15/2016. Cycle # 10 of FOLFOX + Avastin was on 03/29/2016. .4 on 03/29/2016. Cycle # 11 of FOLFOX + Avastin was on 04/12/2016. .4 on 04/12/2016.     Re-staging scans on 04/19/2016: CT Chest: clear lungs; no evidence of recurring pulmonary nodule; CT Abdomen/Pelvis: Further interval decrease in size of the multiple metastatic hepatic lesions, the largest lesion now measures 3.9 x 3.5 cm and previously measured 4.5 cm in maximum diameter. No mesenteric lymphadenopathy is identified; Bone Scan: No evidence of osseous metastasis.   Continue same regimen and 10/31/2017. Cycle # 34 FOLFIRI + Avastin was on 11/14/2017. CEA 7.2 on 11/14/2017. Cycle # 35 FOLFIRI + Avastin was on 11/28/2017. CEA 5.1 on 11/28/2017. Cycle # 36 FOLFIRI + Avastin was on 12/12/2017. CEA 6.1 on 12/12/2017. Bone scan 12/22/2017 noted no evidence of metastatic disease to the axial or appendicular skeleton. CT chest 12/22/2017 noted no evidence of metastatic disease. CT abdomen/pelvis 12/22/2017 noted stable hypodense lesions in the liver. Continue FOLFIRI + Avastin and repeat scans in 3 months. Cycle # 37 FOLFIRI + Avastin was on 12/27/2018. Cycle # 38 FOLFIRI + Avastin was on 01/09/2018. Cycle # 39 FOLFIRI + Avastin was on 01/23/2018. Cycle # 40 FOLFIRI + Avastin was on 02/06/2018. Cycle # 41 FOLFIRI + Avastin was on 02/20/2018. Cycle # 42 FOLFIRI + Avastin was on 03/06/2018. Cycle # 43 FOLFIRI + Avastin was on 03/20/2018. Bone scan on 03/26/2018 negative for metastatic disease. CT chest 03/26/2018 noted ? new 7 mm nodule in LUCY. CT abdomen/pelvis 03/26/2018 noted stable hypodense lesions in the liver. ? New small ascites in the abdomen. Patient wants to continue to monitor the lung finding and repeat scans in 2 months instead of changing the current regimen into oral Lonsurf. Cycle # 44 FOLFIRI + Avastin was on 04/03/2018. CEA 6.3 on 04/03/2018. Cycle # 45 FOLFIRI + Avastin was on 04/24/2018. CEA 5.3 on 04/24/2018. Cycle # 46 FOLFIRI + Avastin was on 05/08/2018. CEA 5.4 on 05/08/2018. Cycle # 47 FOLFIRI + Avastin was on 05/22/2018. CEA 6.1 on 05/22/2018. CT chest 05/31/2018 noted stable nodule in LUCY. No evidence of worsening malignancy. CT abdomen/pelvis on 05/31/2018 noted stable liver metastasis. No evidence of worsening malignancy. Continue FOLFIRI + Avastin and repeat scans in 3 months. Cycle # 48 FOLFIRI + Avastin was on 06/06/2018. Cycle # 49 FOLFIRI + Avastin was on 06/19/2018. Cycle # 50 FOLFIRI + Avastin was on 07/03/2018.   Cycle # 51 FOLFIRI + Avastin was on 07/24/2018. Cycle # 52 FOLFIRI + Avastin was on 08/14/2018. Cycle # 53 FOLFIRI + Avastin was on 08/28/2018. CT chest 09/06/2018 noted interval decrease in size of LUCY measuring up to 4 mm, suggestive of treatment response. No mediastinal or hilar LN  CT abdomen/pelvis 09/06/2018 Slight interval increase in size of a previously noted metastatic lesion within the right hepatic lobe. This may be related to differences in phase of enhancement compared to the most recent study from 5/31/2018, the lesion remains decreased in size compared to the more previous studies. No abdominal, retroperitoneal, or pelvic lymphadenopathy. Continue FOLFIRI + Avastin and repeat scans in 2 months. Cycle # 54 FOLFIRI + Avastin was on 09/11/2018. Cycle # 55 FOLFIRI + Avastin was on 09/25/2018. Cycle # 56 FOLFIRI + Avastin was on 10/09/2018. Cycle # 57 FOLFIRI + Avastin was on 10/23/2018. CT chest 11/02/2018 stable 3 mm nodule within LUCY. No new right and left lung nodule. No mediastinal or osseous lesion. CT abdomen/pelvis 11/02/2018 stable metastatic disease to liver. No new metastatic disease identified. No mesenteric or retroperitoneal LN. No gross colonic lesion identified. Continue FOLFIRI + Avastin and repeat scans in 3 months. Cycle # 58 FOLFIRI + Avastin was on 11/06/2018. CEA 7.6 on 11/05/2018. Cycle # 59 FOLFIRI + Avastin was on 11/27/2018. CEA 6.9 on 11/26/2018. Cycle # 60 FOLFIRI + Avastin was on 12/11/2018. CEA 6.7 on 12/11/2018. Cycle # 61 FOLFIRI + Avastin was on 01/08/2019. CEA 5.6 on 01/07/2019. Cycle # 62 FOLFIRI + Avastin was on 01/29/2019. CEA 4.6 on 01/28/2019. Cycle # 63 FOLFIRI + Avastin was on 02/12/2019. CEA 4.3 on 02/11/2019. CT chest 02/21/2019 no evidence of metastatic disease. CT abdomen/pelvis 02/21/2019 stable hypodense liver lesions. No evidence of worsening metastatic disease. 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  Ordered CEA was 2098 on 01/05/2015. Cycle # 5 of FOLFOX + Avastin was on 01/19/2016. CEA was 1511 on 01/05/2015.     -Bone scan on 01/26/2016 noted no metastatic disease. CT chest on 01/26/2016 revealed Significant response to treatment with no visible residual nodules. CT scan abdomen/pelvis on 01/26/2016 noted Interval decreased size of multiple masses in the liver compatible with treatment response. Continue another 2 months of FOLFOX + Avastin and repeat scans. Cycle # 6 of FOLFOX + Avastin was on 02/02/2016.  on 02/02/2016. Cycle # 7 of FOLFOX + Avastin was on 02/16/2016.  on 02/16/2016. Cycle # 8 of FOLFOX + Avastin was on 03/01/2016.  on 03/01/2016. Cycle # 9 of FOLFOX + Avastin was on 03/15/2016.  on 03/15/2016. Cycle # 10 of FOLFOX + Avastin was on 03/29/2016. .4 on 03/29/2016. Cycle # 11 of FOLFOX + Avastin was on 04/12/2016. .4 on 04/12/2016.     Re-staging scans on 04/19/2016: CT Chest: clear lungs; no evidence of recurring pulmonary nodule; CT Abdomen/Pelvis: Further interval decrease in size of the multiple metastatic hepatic lesions, the largest lesion now measures 3.9 x 3.5 cm and previously measured 4.5 cm in maximum diameter. No mesenteric lymphadenopathy is identified; Bone Scan: No evidence of osseous metastasis. Continue same regimen and re-stage in 2-3 months. Cycle # 12 FOLFOX + Avastin was on 04/26/2016. .5 on 04/26/2016. Cycle # 13 FOLFOX + Avastin was on 05/10/2016. .3 on 05/10/2016. Cycle # 14 FOLFOX+AVASTIN was on 05/24/2016. .5 on 05/24/2016. Cycle # 15 FOLFOX + Avastin was on 06/07/2016. CEA 90.5 on 06/07/2016. Admitted to [facility] 06/13/2016-06/16/2016 for abdominal pain: EGD noted 1.5 cm clean based duodenal bulb ulceration s/p epinephrine and bicap per Dr. Tony Mcqueen. No active bleeding. A. Stomach, biopsy: Mild chronic gastritis, immunostain negative for Helicobacter  B.  Esophagus, biopsy: Gastric glandular mucosa with prominent ntestinal metaplasia ([place]'s epithelium), negative for epithelial dysplasia, esophageal squamous mucosa not identified     Cycle # 16 FOLFOX (discontinued avastin (bevacizumab) given association of peptic ulcer disease and known association of GI perforation.) was on 06/21/2016. CEA 47 on 06/21/2016. Cycle # 17 FOLFOX was on 07/05/2016. CEA 52.6 on 07/05/2016. CEA 47.6 on 07/19/2016.     Re-staging scans 07/19/2106: CT Chest negative for metastatic disease. CT Abdomen/Pelvis: Stable hepatic lesions; Question new mural thickening in the cecum. Bone Scan: New Let hip lesion suspicious for bone metastasis.     Increased CEA; new mural thickening in the cecum and new left hip lesion suspicious for bone metastasis are consistent with disease progression; He derived maximum benefit from FOLFOX/AVASTIN. D/C FOLFOX/AVASTIN. We recommended FOLFIRI second line therapy. Cycle # 1 FOLFIRI was on 08/02/2016. CEA 40.8 on 08/02/2016. Xgeva q4 weeks started on 08/02/2016. Cycle # 2 FOLFIRI was on 08/16/2016. CEA 31.7 on 08/16/2016. Cycle # 3 FOLFIRI (added Avastin) was on 08/30/2016 given that ulcers healed on EGD 08/15/2016 by Dr. Eliza Tipton; Protonix bid since avastin re-started. Colonoscopy on 08/29/2016 (to look at the cecal area) unremarkable. CEA 26.7 on 08/30/2016. Cycle # 4 FOLFIRI/Avastin was on 09/13/2016. CEA 23.4 on 09/13/2016. Cycle # 5 FOLFIRI/Avastin was on 09/27/2016. CEA 22.3 on 09/27/2016. Cycle # 6 FOLFIRI/Avastin was on 10/11/2016. CEA 18.4 on 10/11/2016.      Bone scan 10/21/2016 stable bone metastasis; ? New lesion anterior left sixth rib likely interval healing fracture. CT abdomen/pelvis revealed stable hepatic metastasis. CT chest negative for metastatic disease. Continue FOLFIRI/Avastin and repeat scans in 3 months. Cycle # 7 FOLFIRI/Avastin was on 10/25/2016. CEA 16.1 on 10/25/2016. Cycle # 8 FOLFIRI/Avastin was on 11/08/2016. CEA 15.7 on 11/08/2016.    Cycle # 9 No definite abnormal uptake in the region of a sclerotic lesion along the posterior column of the right acetabulum noted on CT. Stable slight uptake within the left anterior sixth rib corresponding to linear sclerosis on the recent CT, favored to be related to a fracture. Continue FOLFIRI + Avastin and repeat scans in 3 months. Cycle # 19 FOLFIRI + Avastin was on 04/18/2017. CEA 7.5 on 04/18/2017. Cycle # 20 FOLFIRI + Avastin was on 05/02/2017. CEA 7.7 on 05/02/2017. Cycle # 21 FOLFIRI + Avastin was on 05/16/2017. CEA 7.1 on 05/16/2017. Cycle # 22 FOLFIRI + Avastin was on 05/30/2017. CEA 8.2 on 05/30/2017. Cycle # 23 FOLFIRI + Avastin was on 06/13/2017. CEA 7.7 on 06/13/2017. Cycle # 24 FOLFIRI + Avastin was on 06/27/2017. CEA 6.6 on 06/27/2017. Re-staging scans 07/05/2017:  Bone scan stable proximal left femoral diaphysis, right acetabulum, and left anterior sixth rib lesions;  CT abdomen/pelvis stable hypodense metastatic lesions within the liver; CT chest without convincing evidence of metastatic disease. Cycle # 25 FOLFIRI + Avastin was on 07/11/2017. CEA 6.3 on 07/11/2017. Cycle # 26 FOLFIRI + Avastin was on 07/25/2017. CEA 7.3 on 07/25/2017. Cycle # 27 FOLFIRI + Avastin was on 08/08/2017. CEA 6.5 on 08/08/2017. Cycle # 28 FOLFIRI + Avastin was on 08/22/2017. CEA 5.9 on 08/22/2017. Cycle # 29 FOLFIRI + Avastin was on 09/05/2017. CEA 6.3 on 09/05/2017. Cycle # 30 FOLFIRI + Avastin was on 09/19/2017. CEA 6.3 on 09/19/2017. Bone scan 09/26/2017 stable. CT abdomen/pelvis on 09/26/2017 Stable hypodense mass in the liver. Stable sclerotic lesion in the acetabulum. CT chest 09/26/2017 negative for metastatic disease. Cycle # 31 FOLFIRI + Avastin was on 10/03/2017. CEA 7.4 on 10/03/2017. Cycle # 32 FOLFIRI + Avastin was on 10/17/2017. CEA 6.0 on 10/17/2017. Cycle # 33 FOLFIRI + Avastin was on 10/31/2017. CEA 6.8 on 10/31/2017. Cycle # 34 FOLFIRI + Avastin was on 11/14/2017.  CEA 7.2 on 11/14/2017. Cycle # 35 FOLFIRI + Avastin was on 11/28/2017. CEA 5.1 on 11/28/2017. Cycle # 36 FOLFIRI + Avastin was on 12/12/2017. CEA 6.1 on 12/12/2017. Bone scan 12/22/2017 noted no evidence of metastatic disease to the axial or appendicular skeleton. CT chest 12/22/2017 noted no evidence of metastatic disease. CT abdomen/pelvis 12/22/2017 noted stable hypodense lesions in the liver. Continue FOLFIRI + Avastin and repeat scans in 3 months. Cycle # 37 FOLFIRI + Avastin was on 12/27/2018. CEA 6.7 on 12/27/2017. Cycle # 38 FOLFIRI + Avastin was on 01/09/2018. CEA 5.0 on 01/09/2018. Cycle # 39 FOLFIRI + Avastin was on 01/23/2018. CEA 6.5 on 01/23/2018. Cycle # 40 FOLFIRI + Avastin was on 02/06/2018. CEA 6.9 on 02/06/2018. Cycle # 41 FOLFIRI + Avastin was on 02/20/2018. CEA 6.4 on 02/20/2018. Cycle # 42 FOLFIRI + Avastin was on 03/06/2018. CEA 6.6 on 03/06/2018. Cycle # 43 FOLFIRI + Avastin was on 03/20/2018. CEA 5.7 on 03/20/2018. Bone scan on 03/26/2018 negative for metastatic disease. CT chest 03/26/2018 noted ? new 7 mm nodule in LUCY. CT abdomen/pelvis 03/26/2018 noted stable hypodense lesions in the liver. ? New small ascites in the abdomen. Patient wants to continue to monitor the lung finding and repeat scans in 2 months instead of changing the current chemotherapy regimen to oral Lonsurf. Cycle # 44 FOLFIRI + Avastin was on 04/03/2018. CEA 6.3 on 04/03/2018. Cycle # 45 FOLFIRI + Avastin was on 04/24/2018. CEA 5.3 on 04/24/2018. Cycle # 46 FOLFIRI + Avastin was on 05/08/2018. CEA 5.4 on 05/08/2018. Cycle # 47 FOLFIRI + Avastin was on 05/22/2018. CEA 6.1 on 05/22/2018. CT chest 05/31/2018 noted stable nodule in LUCY. No evidence of worsening malignancy. CT abdomen/pelvis on 05/31/2018 noted stable liver metastasis. No evidence of worsening malignancy. Continue FOLFIRI + Avastin and repeat scans in 3 months. Cycle # 48 FOLFIRI + Avastin was on 06/06/2018.  CEA 6.3 on 06/05/2018. Cycle # 49 FOLFIRI + Avastin was on 06/19/2018. CEA 6.1 on 06/19/2018. Cycle # 50 FOLFIRI + Avastin was on 07/03/2018. CEA 7.4 on 07/03/2018. Cycle # 51 FOLFIRI + Avastin was on 07/24/2018. CEA 6.7 on 07/24/2018. Cycle # 52 FOLFIRI + Avastin was on 08/14/2018. CEA 6.8 on 08/14/2018. Cycle # 53 FOLFIRI + Avastin was on 08/28/2018. CEA 6.5 on 08/27/2018. CT chest 09/06/2018 noted interval decrease in size of LUCY measuring up to 4 mm, suggestive of treatment response. No mediastinal or hilar LN  CT abdomen/pelvis 09/06/2018 Slight interval increase in size of a previously noted metastatic lesion within the right hepatic lobe. This may be related to differences in phase of enhancement compared to the most recent study from 5/31/2018, the lesion remains decreased in size compared to the more previous studies. No abdominal, retroperitoneal, or pelvic lymphadenopathy. Continue FOLFIRI + Avastin and repeat scans in 2 months. Cycle # 54 FOLFIRI + Avastin was on 09/11/2018. CEA 6.4 on 09/10/2018. Cycle # 55 FOLFIRI + Avastin was on 09/25/2018. CEA 6.4 on 09/25/2018. Cycle # 56 FOLFIRI + Avastin was on 10/09/2018. CEA 6.7 on 10/08/2018. Cycle # 57 FOLFIRI + Avastin was on 10/23/2018. CEA 7.2 on 10/22/2018. CT chest 11/02/2018 stable 3 mm nodule within LUCY. No new right and left lung nodule. No mediastinal or osseous lesion. CT abdomen/pelvis 11/02/2018 stable metastatic disease to liver. No new metastatic disease identified. No mesenteric or retroperitoneal LN. No gross colonic lesion identified. Continue FOLFIRI + Avastin and repeat scans in 3 months. Cycle # 58 FOLFIRI + Avastin was on 11/06/2018. CEA 7.6 on 11/05/2018. Cycle # 59 FOLFIRI + Avastin was on 11/27/2018. CEA 6.9 on 11/26/2018. Cycle # 60 FOLFIRI + Avastin was on 12/11/2018. CEA 6.7 on 12/11/2018. Cycle # 61 FOLFIRI + Avastin was on 01/08/2019. CEA 5.6 on 01/07/2019. Cycle # 62 FOLFIRI + Avastin was on 01/29/2019.  CEA 4.6

## 2019-09-20 ENCOUNTER — HOSPITAL ENCOUNTER (OUTPATIENT)
Dept: INFUSION THERAPY | Age: 69
Discharge: HOME | End: 2019-09-20
Payer: MEDICARE

## 2019-09-20 DIAGNOSIS — C78.7 RECTAL CANCER METASTASIZED TO LIVER (HCC): ICD-10-CM

## 2019-09-20 DIAGNOSIS — Z51.11 ENCOUNTER FOR ANTINEOPLASTIC CHEMOTHERAPY: ICD-10-CM

## 2019-09-20 DIAGNOSIS — C20 RECTAL CANCER METASTASIZED TO LIVER (HCC): ICD-10-CM

## 2019-09-20 DIAGNOSIS — D70.1 CHEMOTHERAPY-INDUCED NEUTROPENIA (HCC): Primary | ICD-10-CM

## 2019-09-20 DIAGNOSIS — T45.1X5A CHEMOTHERAPY-INDUCED NEUTROPENIA (HCC): Primary | ICD-10-CM

## 2019-09-20 PROCEDURE — 96372 THER/PROPH/DIAG INJ SC/IM: CPT

## 2019-09-20 PROCEDURE — 6360000002 HC RX W HCPCS: Performed by: INTERNAL MEDICINE

## 2019-09-20 RX ADMIN — PEGFILGRASTIM 6 MG: 6 INJECTION SUBCUTANEOUS at 08:20

## 2019-10-14 ENCOUNTER — HOSPITAL ENCOUNTER (OUTPATIENT)
Age: 69
Discharge: HOME | End: 2019-10-14
Payer: MEDICARE

## 2019-10-14 LAB
ALBUMIN SERPL-MCNC: 3.7 G/DL (ref 3.5–5.2)
ALP BLD-CCNC: 161 U/L (ref 40–129)
ALT SERPL-CCNC: 19 U/L (ref 0–40)
ANION GAP SERPL CALCULATED.3IONS-SCNC: 12 MMOL/L (ref 7–16)
AST SERPL-CCNC: 31 U/L (ref 0–39)
BASOPHILS ABSOLUTE: 0.04 E9/L (ref 0–0.2)
BASOPHILS RELATIVE PERCENT: 0.9 % (ref 0–2)
BILIRUB SERPL-MCNC: 0.7 MG/DL (ref 0–1.2)
BUN BLDV-MCNC: 23 MG/DL (ref 8–23)
CALCIUM SERPL-MCNC: 9.7 MG/DL (ref 8.6–10.2)
CEA: 4.6 NG/ML (ref 0–5.2)
CHLORIDE BLD-SCNC: 107 MMOL/L (ref 98–107)
CO2: 25 MMOL/L (ref 22–29)
CREAT SERPL-MCNC: 1.1 MG/DL (ref 0.7–1.2)
EOSINOPHILS ABSOLUTE: 0.22 E9/L (ref 0.05–0.5)
EOSINOPHILS RELATIVE PERCENT: 4.7 % (ref 0–6)
GFR AFRICAN AMERICAN: >60
GFR NON-AFRICAN AMERICAN: >60 ML/MIN/1.73
GLUCOSE BLD-MCNC: 127 MG/DL (ref 74–99)
HCT VFR BLD CALC: 31.3 % (ref 37–54)
HEMOGLOBIN: 9.8 G/DL (ref 12.5–16.5)
IMMATURE GRANULOCYTES #: 0.02 E9/L
IMMATURE GRANULOCYTES %: 0.4 % (ref 0–5)
LYMPHOCYTES ABSOLUTE: 0.6 E9/L (ref 1.5–4)
LYMPHOCYTES RELATIVE PERCENT: 12.8 % (ref 20–42)
MCH RBC QN AUTO: 32.6 PG (ref 26–35)
MCHC RBC AUTO-ENTMCNC: 31.3 % (ref 32–34.5)
MCV RBC AUTO: 104 FL (ref 80–99.9)
MONOCYTES ABSOLUTE: 0.38 E9/L (ref 0.1–0.95)
MONOCYTES RELATIVE PERCENT: 8.1 % (ref 2–12)
NEUTROPHILS ABSOLUTE: 3.42 E9/L (ref 1.8–7.3)
NEUTROPHILS RELATIVE PERCENT: 73.1 % (ref 43–80)
PDW BLD-RTO: 18 FL (ref 11.5–15)
PLATELET # BLD: 100 E9/L (ref 130–450)
PMV BLD AUTO: 11.8 FL (ref 7–12)
POTASSIUM SERPL-SCNC: 4.3 MMOL/L (ref 3.5–5)
RBC # BLD: 3.01 E12/L (ref 3.8–5.8)
SODIUM BLD-SCNC: 144 MMOL/L (ref 132–146)
TOTAL PROTEIN: 6.9 G/DL (ref 6.4–8.3)
WBC # BLD: 4.7 E9/L (ref 4.5–11.5)

## 2019-10-14 PROCEDURE — 36415 COLL VENOUS BLD VENIPUNCTURE: CPT

## 2019-10-14 PROCEDURE — 80053 COMPREHEN METABOLIC PANEL: CPT

## 2019-10-14 PROCEDURE — 82378 CARCINOEMBRYONIC ANTIGEN: CPT

## 2019-10-14 PROCEDURE — 85025 COMPLETE CBC W/AUTO DIFF WBC: CPT

## 2019-10-15 ENCOUNTER — TELEPHONE (OUTPATIENT)
Dept: INFUSION THERAPY | Age: 69
End: 2019-10-15

## 2019-10-15 ENCOUNTER — OFFICE VISIT (OUTPATIENT)
Dept: ONCOLOGY | Age: 69
End: 2019-10-15
Payer: MEDICARE

## 2019-10-15 ENCOUNTER — HOSPITAL ENCOUNTER (OUTPATIENT)
Dept: INFUSION THERAPY | Age: 69
Discharge: HOME | End: 2019-10-15
Payer: MEDICARE

## 2019-10-15 VITALS
SYSTOLIC BLOOD PRESSURE: 137 MMHG | DIASTOLIC BLOOD PRESSURE: 70 MMHG | WEIGHT: 207.5 LBS | HEIGHT: 73 IN | HEART RATE: 64 BPM | BODY MASS INDEX: 27.5 KG/M2 | TEMPERATURE: 97.1 F | OXYGEN SATURATION: 99 %

## 2019-10-15 VITALS — TEMPERATURE: 97.4 F | HEART RATE: 60 BPM | DIASTOLIC BLOOD PRESSURE: 70 MMHG | SYSTOLIC BLOOD PRESSURE: 134 MMHG

## 2019-10-15 DIAGNOSIS — C79.51 BONE METASTASIS (HCC): ICD-10-CM

## 2019-10-15 DIAGNOSIS — C78.7 RECTAL CANCER METASTASIZED TO LIVER (HCC): Primary | ICD-10-CM

## 2019-10-15 DIAGNOSIS — Z09 FOLLOW UP: Primary | ICD-10-CM

## 2019-10-15 DIAGNOSIS — C20 RECTAL CANCER METASTASIZED TO LIVER (HCC): Primary | ICD-10-CM

## 2019-10-15 PROCEDURE — 6360000002 HC RX W HCPCS: Performed by: INTERNAL MEDICINE

## 2019-10-15 PROCEDURE — 2580000003 HC RX 258: Performed by: INTERNAL MEDICINE

## 2019-10-15 PROCEDURE — 96413 CHEMO IV INFUSION 1 HR: CPT

## 2019-10-15 PROCEDURE — 96372 THER/PROPH/DIAG INJ SC/IM: CPT

## 2019-10-15 PROCEDURE — 96368 THER/DIAG CONCURRENT INF: CPT

## 2019-10-15 PROCEDURE — 96375 TX/PRO/DX INJ NEW DRUG ADDON: CPT

## 2019-10-15 PROCEDURE — 96411 CHEMO IV PUSH ADDL DRUG: CPT

## 2019-10-15 PROCEDURE — 96415 CHEMO IV INFUSION ADDL HR: CPT

## 2019-10-15 PROCEDURE — 99214 OFFICE O/P EST MOD 30 MIN: CPT | Performed by: INTERNAL MEDICINE

## 2019-10-15 PROCEDURE — 96417 CHEMO IV INFUS EACH ADDL SEQ: CPT

## 2019-10-15 RX ORDER — 0.9 % SODIUM CHLORIDE 0.9 %
10 VIAL (ML) INJECTION ONCE
Status: CANCELLED | OUTPATIENT
Start: 2019-10-15

## 2019-10-15 RX ORDER — SODIUM CHLORIDE 9 MG/ML
250 INJECTION, SOLUTION INTRAVENOUS CONTINUOUS
Status: CANCELLED | OUTPATIENT
Start: 2019-10-15

## 2019-10-15 RX ORDER — HEPARIN SODIUM (PORCINE) LOCK FLUSH IV SOLN 100 UNIT/ML 100 UNIT/ML
500 SOLUTION INTRAVENOUS PRN
Status: DISCONTINUED | OUTPATIENT
Start: 2019-10-15 | End: 2019-10-16 | Stop reason: HOSPADM

## 2019-10-15 RX ORDER — METHYLPREDNISOLONE SODIUM SUCCINATE 125 MG/2ML
125 INJECTION, POWDER, LYOPHILIZED, FOR SOLUTION INTRAMUSCULAR; INTRAVENOUS ONCE
Status: CANCELLED | OUTPATIENT
Start: 2019-10-15

## 2019-10-15 RX ORDER — FLUOROURACIL 50 MG/ML
850 INJECTION, SOLUTION INTRAVENOUS ONCE
Status: CANCELLED | OUTPATIENT
Start: 2019-10-15

## 2019-10-15 RX ORDER — PALONOSETRON HYDROCHLORIDE 0.05 MG/ML
0.25 INJECTION, SOLUTION INTRAVENOUS ONCE
Status: COMPLETED | OUTPATIENT
Start: 2019-10-15 | End: 2019-10-15

## 2019-10-15 RX ORDER — FLUOROURACIL 50 MG/ML
850 INJECTION, SOLUTION INTRAVENOUS ONCE
Status: COMPLETED | OUTPATIENT
Start: 2019-10-15 | End: 2019-10-15

## 2019-10-15 RX ORDER — ATROPINE SULFATE 0.4 MG/ML
0.4 AMPUL (ML) INJECTION ONCE
Status: CANCELLED | OUTPATIENT
Start: 2019-10-15

## 2019-10-15 RX ORDER — SODIUM CHLORIDE 0.9 % (FLUSH) 0.9 %
10 SYRINGE (ML) INJECTION PRN
Status: CANCELLED | OUTPATIENT
Start: 2019-10-15

## 2019-10-15 RX ORDER — DEXAMETHASONE SODIUM PHOSPHATE 10 MG/ML
10 INJECTION, SOLUTION INTRAMUSCULAR; INTRAVENOUS ONCE
Status: CANCELLED | OUTPATIENT
Start: 2019-10-15

## 2019-10-15 RX ORDER — SODIUM CHLORIDE 0.9 % (FLUSH) 0.9 %
10 SYRINGE (ML) INJECTION PRN
Status: DISCONTINUED | OUTPATIENT
Start: 2019-10-15 | End: 2019-10-16 | Stop reason: HOSPADM

## 2019-10-15 RX ORDER — HEPARIN SODIUM (PORCINE) LOCK FLUSH IV SOLN 100 UNIT/ML 100 UNIT/ML
500 SOLUTION INTRAVENOUS PRN
Status: CANCELLED | OUTPATIENT
Start: 2019-10-15

## 2019-10-15 RX ORDER — ATROPINE SULFATE 0.4 MG/ML
0.4 AMPUL (ML) INJECTION ONCE
Status: COMPLETED | OUTPATIENT
Start: 2019-10-15 | End: 2019-10-15

## 2019-10-15 RX ORDER — SODIUM CHLORIDE 9 MG/ML
250 INJECTION, SOLUTION INTRAVENOUS CONTINUOUS
Status: DISCONTINUED | OUTPATIENT
Start: 2019-10-15 | End: 2019-10-16 | Stop reason: HOSPADM

## 2019-10-15 RX ORDER — PALONOSETRON HYDROCHLORIDE 0.05 MG/ML
0.25 INJECTION, SOLUTION INTRAVENOUS ONCE
Status: CANCELLED | OUTPATIENT
Start: 2019-10-15

## 2019-10-15 RX ORDER — EPINEPHRINE 1 MG/ML
0.3 INJECTION, SOLUTION, CONCENTRATE INTRAVENOUS PRN
Status: CANCELLED | OUTPATIENT
Start: 2019-10-15

## 2019-10-15 RX ORDER — DEXAMETHASONE SODIUM PHOSPHATE 10 MG/ML
10 INJECTION INTRAMUSCULAR; INTRAVENOUS ONCE
Status: COMPLETED | OUTPATIENT
Start: 2019-10-15 | End: 2019-10-15

## 2019-10-15 RX ORDER — SODIUM CHLORIDE 9 MG/ML
INJECTION, SOLUTION INTRAVENOUS CONTINUOUS
Status: CANCELLED | OUTPATIENT
Start: 2019-10-15

## 2019-10-15 RX ORDER — DIPHENHYDRAMINE HYDROCHLORIDE 50 MG/ML
50 INJECTION INTRAMUSCULAR; INTRAVENOUS ONCE
Status: CANCELLED | OUTPATIENT
Start: 2019-10-15

## 2019-10-15 RX ADMIN — DEXAMETHASONE SODIUM PHOSPHATE 10 MG: 10 INJECTION INTRAMUSCULAR; INTRAVENOUS at 09:08

## 2019-10-15 RX ADMIN — LEUCOVORIN CALCIUM 850 MG: 10 INJECTION INTRAMUSCULAR; INTRAVENOUS at 10:00

## 2019-10-15 RX ADMIN — SODIUM CHLORIDE 250 ML: 9 INJECTION, SOLUTION INTRAVENOUS at 08:53

## 2019-10-15 RX ADMIN — FLUOROURACIL 850 MG: 50 INJECTION, SOLUTION INTRAVENOUS at 11:42

## 2019-10-15 RX ADMIN — BEVACIZUMAB 400 MG: 400 INJECTION, SOLUTION INTRAVENOUS at 09:25

## 2019-10-15 RX ADMIN — SODIUM CHLORIDE 400 MG: 9 INJECTION, SOLUTION INTRAVENOUS at 10:00

## 2019-10-15 RX ADMIN — Medication 500 UNITS: at 11:44

## 2019-10-15 RX ADMIN — ATROPINE SULFATE 0.4 MG: 0.4 INJECTION, SOLUTION INTRAMUSCULAR; INTRAVENOUS; SUBCUTANEOUS at 09:03

## 2019-10-15 RX ADMIN — PALONOSETRON 0.25 MG: 0.25 INJECTION, SOLUTION INTRAVENOUS at 09:00

## 2019-10-15 RX ADMIN — Medication 10 ML: at 11:44

## 2019-10-15 RX ADMIN — DENOSUMAB 120 MG: 120 INJECTION SUBCUTANEOUS at 09:05

## 2019-10-15 NOTE — PROGRESS NOTES
Patient presents to clinic today for Xgeva injection. Patient's labs monitored, specifically, Calcium level, to ensure no increased risk of hypocalcemia with administration of the medication. Patient's calcium level is 9.7 mg/dL. Patient received therapy and will continue to be monitored prior to each dose.     Amina Gray [place] 10/15/2019 8:58 AM

## 2019-10-17 ENCOUNTER — HOSPITAL ENCOUNTER (OUTPATIENT)
Dept: INFUSION THERAPY | Age: 69
Discharge: HOME | End: 2019-10-17
Payer: MEDICARE

## 2019-10-17 DIAGNOSIS — C78.7 RECTAL CANCER METASTASIZED TO LIVER (HCC): ICD-10-CM

## 2019-10-17 DIAGNOSIS — Z51.11 ENCOUNTER FOR ANTINEOPLASTIC CHEMOTHERAPY: Primary | ICD-10-CM

## 2019-10-17 DIAGNOSIS — C20 RECTAL CANCER METASTASIZED TO LIVER (HCC): ICD-10-CM

## 2019-10-17 PROCEDURE — 96372 THER/PROPH/DIAG INJ SC/IM: CPT

## 2019-10-17 PROCEDURE — 6360000002 HC RX W HCPCS: Performed by: INTERNAL MEDICINE

## 2019-10-17 RX ADMIN — PEGFILGRASTIM 6 MG: 6 INJECTION SUBCUTANEOUS at 13:48

## 2019-10-28 ENCOUNTER — HOSPITAL ENCOUNTER (OUTPATIENT)
Age: 69
Discharge: HOME | End: 2019-10-28
Payer: MEDICARE

## 2019-10-28 DIAGNOSIS — C79.51 BONE METASTASIS (HCC): ICD-10-CM

## 2019-10-28 DIAGNOSIS — C78.7 RECTAL CANCER METASTASIZED TO LIVER (HCC): ICD-10-CM

## 2019-10-28 DIAGNOSIS — C20 RECTAL CANCER METASTASIZED TO LIVER (HCC): ICD-10-CM

## 2019-10-28 LAB
ALBUMIN SERPL-MCNC: 3.6 G/DL (ref 3.5–5.2)
ALP BLD-CCNC: 147 U/L (ref 40–129)
ALT SERPL-CCNC: 14 U/L (ref 0–40)
ANION GAP SERPL CALCULATED.3IONS-SCNC: 11 MMOL/L (ref 7–16)
ANISOCYTOSIS: ABNORMAL
AST SERPL-CCNC: 21 U/L (ref 0–39)
BASOPHILS ABSOLUTE: 0.02 E9/L (ref 0–0.2)
BASOPHILS RELATIVE PERCENT: 0.9 % (ref 0–2)
BILIRUB SERPL-MCNC: 0.8 MG/DL (ref 0–1.2)
BUN BLDV-MCNC: 20 MG/DL (ref 8–23)
CALCIUM SERPL-MCNC: 9 MG/DL (ref 8.6–10.2)
CEA: 4.1 NG/ML (ref 0–5.2)
CHLORIDE BLD-SCNC: 108 MMOL/L (ref 98–107)
CO2: 25 MMOL/L (ref 22–29)
CREAT SERPL-MCNC: 1.1 MG/DL (ref 0.7–1.2)
EOSINOPHILS ABSOLUTE: 0.06 E9/L (ref 0.05–0.5)
EOSINOPHILS RELATIVE PERCENT: 2.7 % (ref 0–6)
GFR AFRICAN AMERICAN: >60
GFR NON-AFRICAN AMERICAN: >60 ML/MIN/1.73
GLUCOSE BLD-MCNC: 134 MG/DL (ref 74–99)
HCT VFR BLD CALC: 27.2 % (ref 37–54)
HEMOGLOBIN: 8.5 G/DL (ref 12.5–16.5)
LYMPHOCYTES ABSOLUTE: 0.53 E9/L (ref 1.5–4)
LYMPHOCYTES RELATIVE PERCENT: 22.1 % (ref 20–42)
MCH RBC QN AUTO: 31.7 PG (ref 26–35)
MCHC RBC AUTO-ENTMCNC: 31.3 % (ref 32–34.5)
MCV RBC AUTO: 101.5 FL (ref 80–99.9)
MONOCYTES ABSOLUTE: 0.1 E9/L (ref 0.1–0.95)
MONOCYTES RELATIVE PERCENT: 3.5 % (ref 2–12)
NEUTROPHILS ABSOLUTE: 1.7 E9/L (ref 1.8–7.3)
NEUTROPHILS RELATIVE PERCENT: 70.8 % (ref 43–80)
OVALOCYTES: ABNORMAL
PDW BLD-RTO: 16.7 FL (ref 11.5–15)
PLATELET # BLD: 75 E9/L (ref 130–450)
PLATELET CONFIRMATION: NORMAL
PMV BLD AUTO: 9.3 FL (ref 7–12)
POIKILOCYTES: ABNORMAL
POLYCHROMASIA: ABNORMAL
POTASSIUM SERPL-SCNC: 4.2 MMOL/L (ref 3.5–5)
RBC # BLD: 2.68 E12/L (ref 3.8–5.8)
SODIUM BLD-SCNC: 144 MMOL/L (ref 132–146)
TEAR DROP CELLS: ABNORMAL
TOTAL PROTEIN: 6.4 G/DL (ref 6.4–8.3)
WBC # BLD: 2.4 E9/L (ref 4.5–11.5)

## 2019-10-28 PROCEDURE — 85025 COMPLETE CBC W/AUTO DIFF WBC: CPT

## 2019-10-28 PROCEDURE — 80053 COMPREHEN METABOLIC PANEL: CPT

## 2019-10-28 PROCEDURE — 82378 CARCINOEMBRYONIC ANTIGEN: CPT

## 2019-10-28 PROCEDURE — 36415 COLL VENOUS BLD VENIPUNCTURE: CPT

## 2019-10-29 ENCOUNTER — HOSPITAL ENCOUNTER (OUTPATIENT)
Dept: INFUSION THERAPY | Age: 69
Discharge: HOME | End: 2019-10-29
Payer: MEDICARE

## 2019-10-29 ENCOUNTER — OFFICE VISIT (OUTPATIENT)
Dept: ONCOLOGY | Age: 69
End: 2019-10-29
Payer: MEDICARE

## 2019-10-29 VITALS
HEIGHT: 73 IN | HEART RATE: 60 BPM | DIASTOLIC BLOOD PRESSURE: 71 MMHG | TEMPERATURE: 97.4 F | OXYGEN SATURATION: 100 % | WEIGHT: 212.9 LBS | SYSTOLIC BLOOD PRESSURE: 131 MMHG | BODY MASS INDEX: 28.22 KG/M2

## 2019-10-29 VITALS — DIASTOLIC BLOOD PRESSURE: 68 MMHG | HEART RATE: 68 BPM | SYSTOLIC BLOOD PRESSURE: 125 MMHG | TEMPERATURE: 97.6 F

## 2019-10-29 DIAGNOSIS — Z09 FOLLOW UP: Primary | ICD-10-CM

## 2019-10-29 DIAGNOSIS — C78.7 RECTAL CANCER METASTASIZED TO LIVER (HCC): Primary | ICD-10-CM

## 2019-10-29 DIAGNOSIS — C20 RECTAL CANCER METASTASIZED TO LIVER (HCC): Primary | ICD-10-CM

## 2019-10-29 PROCEDURE — 96368 THER/DIAG CONCURRENT INF: CPT

## 2019-10-29 PROCEDURE — 96417 CHEMO IV INFUS EACH ADDL SEQ: CPT

## 2019-10-29 PROCEDURE — 96411 CHEMO IV PUSH ADDL DRUG: CPT

## 2019-10-29 PROCEDURE — 99214 OFFICE O/P EST MOD 30 MIN: CPT | Performed by: INTERNAL MEDICINE

## 2019-10-29 PROCEDURE — 96413 CHEMO IV INFUSION 1 HR: CPT

## 2019-10-29 PROCEDURE — 2580000003 HC RX 258: Performed by: INTERNAL MEDICINE

## 2019-10-29 PROCEDURE — 6360000002 HC RX W HCPCS: Performed by: INTERNAL MEDICINE

## 2019-10-29 PROCEDURE — 96415 CHEMO IV INFUSION ADDL HR: CPT

## 2019-10-29 PROCEDURE — 96375 TX/PRO/DX INJ NEW DRUG ADDON: CPT

## 2019-10-29 RX ORDER — PALONOSETRON HYDROCHLORIDE 0.05 MG/ML
0.25 INJECTION, SOLUTION INTRAVENOUS ONCE
Status: COMPLETED | OUTPATIENT
Start: 2019-10-29 | End: 2019-10-29

## 2019-10-29 RX ORDER — DEXAMETHASONE SODIUM PHOSPHATE 10 MG/ML
10 INJECTION, SOLUTION INTRAMUSCULAR; INTRAVENOUS ONCE
Status: CANCELLED | OUTPATIENT
Start: 2019-10-29

## 2019-10-29 RX ORDER — FLUOROURACIL 50 MG/ML
850 INJECTION, SOLUTION INTRAVENOUS ONCE
Status: COMPLETED | OUTPATIENT
Start: 2019-10-29 | End: 2019-10-29

## 2019-10-29 RX ORDER — FLUOROURACIL 50 MG/ML
850 INJECTION, SOLUTION INTRAVENOUS ONCE
Status: CANCELLED | OUTPATIENT
Start: 2019-10-29

## 2019-10-29 RX ORDER — SODIUM CHLORIDE 9 MG/ML
250 INJECTION, SOLUTION INTRAVENOUS CONTINUOUS
Status: DISCONTINUED | OUTPATIENT
Start: 2019-10-29 | End: 2019-10-30 | Stop reason: HOSPADM

## 2019-10-29 RX ORDER — 0.9 % SODIUM CHLORIDE 0.9 %
10 VIAL (ML) INJECTION ONCE
Status: CANCELLED | OUTPATIENT
Start: 2019-10-29

## 2019-10-29 RX ORDER — DEXAMETHASONE SODIUM PHOSPHATE 10 MG/ML
10 INJECTION INTRAMUSCULAR; INTRAVENOUS ONCE
Status: COMPLETED | OUTPATIENT
Start: 2019-10-29 | End: 2019-10-29

## 2019-10-29 RX ORDER — PALONOSETRON HYDROCHLORIDE 0.05 MG/ML
0.25 INJECTION, SOLUTION INTRAVENOUS ONCE
Status: CANCELLED | OUTPATIENT
Start: 2019-10-29

## 2019-10-29 RX ORDER — SODIUM CHLORIDE 0.9 % (FLUSH) 0.9 %
10 SYRINGE (ML) INJECTION PRN
Status: DISCONTINUED | OUTPATIENT
Start: 2019-10-29 | End: 2019-10-30 | Stop reason: HOSPADM

## 2019-10-29 RX ORDER — SODIUM CHLORIDE 9 MG/ML
250 INJECTION, SOLUTION INTRAVENOUS CONTINUOUS
Status: CANCELLED | OUTPATIENT
Start: 2019-10-29

## 2019-10-29 RX ORDER — ATROPINE SULFATE 0.4 MG/ML
0.4 AMPUL (ML) INJECTION ONCE
Status: CANCELLED | OUTPATIENT
Start: 2019-10-29

## 2019-10-29 RX ORDER — ATROPINE SULFATE 0.4 MG/ML
0.4 AMPUL (ML) INJECTION ONCE
Status: COMPLETED | OUTPATIENT
Start: 2019-10-29 | End: 2019-10-29

## 2019-10-29 RX ORDER — EPINEPHRINE 1 MG/ML
0.3 INJECTION, SOLUTION, CONCENTRATE INTRAVENOUS PRN
Status: CANCELLED | OUTPATIENT
Start: 2019-10-29

## 2019-10-29 RX ORDER — HEPARIN SODIUM (PORCINE) LOCK FLUSH IV SOLN 100 UNIT/ML 100 UNIT/ML
500 SOLUTION INTRAVENOUS PRN
Status: CANCELLED | OUTPATIENT
Start: 2019-10-29

## 2019-10-29 RX ORDER — DIPHENHYDRAMINE HYDROCHLORIDE 50 MG/ML
50 INJECTION INTRAMUSCULAR; INTRAVENOUS ONCE
Status: CANCELLED | OUTPATIENT
Start: 2019-10-29

## 2019-10-29 RX ORDER — METHYLPREDNISOLONE SODIUM SUCCINATE 125 MG/2ML
125 INJECTION, POWDER, LYOPHILIZED, FOR SOLUTION INTRAMUSCULAR; INTRAVENOUS ONCE
Status: CANCELLED | OUTPATIENT
Start: 2019-10-29

## 2019-10-29 RX ORDER — SODIUM CHLORIDE 9 MG/ML
INJECTION, SOLUTION INTRAVENOUS CONTINUOUS
Status: CANCELLED | OUTPATIENT
Start: 2019-10-29

## 2019-10-29 RX ORDER — HEPARIN SODIUM (PORCINE) LOCK FLUSH IV SOLN 100 UNIT/ML 100 UNIT/ML
500 SOLUTION INTRAVENOUS PRN
Status: DISCONTINUED | OUTPATIENT
Start: 2019-10-29 | End: 2019-10-30 | Stop reason: HOSPADM

## 2019-10-29 RX ORDER — SODIUM CHLORIDE 0.9 % (FLUSH) 0.9 %
10 SYRINGE (ML) INJECTION PRN
Status: CANCELLED | OUTPATIENT
Start: 2019-10-29

## 2019-10-29 RX ADMIN — DEXAMETHASONE SODIUM PHOSPHATE 10 MG: 10 INJECTION INTRAMUSCULAR; INTRAVENOUS at 08:48

## 2019-10-29 RX ADMIN — LEUCOVORIN CALCIUM 850 MG: 10 INJECTION INTRAMUSCULAR; INTRAVENOUS at 09:52

## 2019-10-29 RX ADMIN — Medication 500 UNITS: at 11:35

## 2019-10-29 RX ADMIN — ATROPINE SULFATE 0.4 MG: 0.4 INJECTION, SOLUTION INTRAMUSCULAR; INTRAVENOUS; SUBCUTANEOUS at 08:45

## 2019-10-29 RX ADMIN — PALONOSETRON 0.25 MG: 0.25 INJECTION, SOLUTION INTRAVENOUS at 08:41

## 2019-10-29 RX ADMIN — SODIUM CHLORIDE 400 MG: 9 INJECTION, SOLUTION INTRAVENOUS at 09:52

## 2019-10-29 RX ADMIN — BEVACIZUMAB 400 MG: 400 INJECTION, SOLUTION INTRAVENOUS at 09:15

## 2019-10-29 RX ADMIN — FLUOROURACIL 850 MG: 50 INJECTION, SOLUTION INTRAVENOUS at 11:30

## 2019-10-29 RX ADMIN — SODIUM CHLORIDE 250 ML: 9 INJECTION, SOLUTION INTRAVENOUS at 08:40

## 2019-10-29 RX ADMIN — Medication 10 ML: at 11:35

## 2019-10-29 NOTE — PROGRESS NOTES
Call placed to [employer] and spoke with Rocio Moya to confirm arrival of order for 5 FU pump. Pt to be accessed today and de accessed in 46 hrs.

## 2019-10-31 ENCOUNTER — HOSPITAL ENCOUNTER (OUTPATIENT)
Dept: INFUSION THERAPY | Age: 69
Discharge: HOME | End: 2019-10-31
Payer: MEDICARE

## 2019-10-31 DIAGNOSIS — C78.7 RECTAL CANCER METASTASIZED TO LIVER (HCC): ICD-10-CM

## 2019-10-31 DIAGNOSIS — Z51.11 ENCOUNTER FOR ANTINEOPLASTIC CHEMOTHERAPY: Primary | ICD-10-CM

## 2019-10-31 DIAGNOSIS — C20 RECTAL CANCER METASTASIZED TO LIVER (HCC): ICD-10-CM

## 2019-10-31 PROCEDURE — 6360000002 HC RX W HCPCS: Performed by: INTERNAL MEDICINE

## 2019-10-31 PROCEDURE — 96372 THER/PROPH/DIAG INJ SC/IM: CPT

## 2019-10-31 RX ADMIN — PEGFILGRASTIM 6 MG: 6 INJECTION SUBCUTANEOUS at 14:46

## 2019-11-11 ENCOUNTER — HOSPITAL ENCOUNTER (OUTPATIENT)
Age: 69
Discharge: HOME | End: 2019-11-11
Payer: MEDICARE

## 2019-11-11 DIAGNOSIS — C20 RECTAL CANCER METASTASIZED TO LIVER (HCC): Primary | ICD-10-CM

## 2019-11-11 DIAGNOSIS — C78.7 RECTAL CANCER METASTASIZED TO LIVER (HCC): Primary | ICD-10-CM

## 2019-11-11 LAB
ALBUMIN SERPL-MCNC: 3.6 G/DL (ref 3.5–5.2)
ALP BLD-CCNC: 134 U/L (ref 40–129)
ALT SERPL-CCNC: 18 U/L (ref 0–40)
ANION GAP SERPL CALCULATED.3IONS-SCNC: 11 MMOL/L (ref 7–16)
ANISOCYTOSIS: ABNORMAL
AST SERPL-CCNC: 30 U/L (ref 0–39)
BASOPHILS ABSOLUTE: 0.02 E9/L (ref 0–0.2)
BASOPHILS RELATIVE PERCENT: 0.9 % (ref 0–2)
BILIRUB SERPL-MCNC: 0.8 MG/DL (ref 0–1.2)
BUN BLDV-MCNC: 10 MG/DL (ref 8–23)
CALCIUM SERPL-MCNC: 8.9 MG/DL (ref 8.6–10.2)
CEA: 4.3 NG/ML (ref 0–5.2)
CHLORIDE BLD-SCNC: 105 MMOL/L (ref 98–107)
CO2: 25 MMOL/L (ref 22–29)
CREAT SERPL-MCNC: 1.2 MG/DL (ref 0.7–1.2)
EOSINOPHILS ABSOLUTE: 0.03 E9/L (ref 0.05–0.5)
EOSINOPHILS RELATIVE PERCENT: 1.4 % (ref 0–6)
GFR AFRICAN AMERICAN: >60
GFR NON-AFRICAN AMERICAN: 60 ML/MIN/1.73
GLUCOSE BLD-MCNC: 135 MG/DL (ref 74–99)
HCT VFR BLD CALC: 26.9 % (ref 37–54)
HEMOGLOBIN: 8.4 G/DL (ref 12.5–16.5)
IMMATURE GRANULOCYTES #: 0.02 E9/L
IMMATURE GRANULOCYTES %: 0.9 % (ref 0–5)
LYMPHOCYTES ABSOLUTE: 0.48 E9/L (ref 1.5–4)
LYMPHOCYTES RELATIVE PERCENT: 22.1 % (ref 20–42)
MCH RBC QN AUTO: 31.1 PG (ref 26–35)
MCHC RBC AUTO-ENTMCNC: 31.2 % (ref 32–34.5)
MCV RBC AUTO: 99.6 FL (ref 80–99.9)
MONOCYTES ABSOLUTE: 0.16 E9/L (ref 0.1–0.95)
MONOCYTES RELATIVE PERCENT: 7.4 % (ref 2–12)
NEUTROPHILS ABSOLUTE: 1.46 E9/L (ref 1.8–7.3)
NEUTROPHILS RELATIVE PERCENT: 67.3 % (ref 43–80)
OVALOCYTES: ABNORMAL
PDW BLD-RTO: 17.1 FL (ref 11.5–15)
PLATELET # BLD: 110 E9/L (ref 130–450)
PMV BLD AUTO: 11.2 FL (ref 7–12)
POIKILOCYTES: ABNORMAL
POLYCHROMASIA: ABNORMAL
POTASSIUM SERPL-SCNC: 3.7 MMOL/L (ref 3.5–5)
RBC # BLD: 2.7 E12/L (ref 3.8–5.8)
SODIUM BLD-SCNC: 141 MMOL/L (ref 132–146)
TEAR DROP CELLS: ABNORMAL
TOTAL PROTEIN: 6.6 G/DL (ref 6.4–8.3)
WBC # BLD: 2.2 E9/L (ref 4.5–11.5)

## 2019-11-11 PROCEDURE — 82378 CARCINOEMBRYONIC ANTIGEN: CPT

## 2019-11-11 PROCEDURE — 85025 COMPLETE CBC W/AUTO DIFF WBC: CPT

## 2019-11-11 PROCEDURE — 80053 COMPREHEN METABOLIC PANEL: CPT

## 2019-11-11 PROCEDURE — 36415 COLL VENOUS BLD VENIPUNCTURE: CPT

## 2019-11-12 ENCOUNTER — TELEPHONE (OUTPATIENT)
Dept: INFUSION THERAPY | Age: 69
End: 2019-11-12

## 2019-11-12 ENCOUNTER — OFFICE VISIT (OUTPATIENT)
Dept: ONCOLOGY | Age: 69
End: 2019-11-12
Payer: MEDICARE

## 2019-11-12 ENCOUNTER — HOSPITAL ENCOUNTER (OUTPATIENT)
Dept: INFUSION THERAPY | Age: 69
Discharge: HOME | End: 2019-11-12
Payer: MEDICARE

## 2019-11-12 VITALS — DIASTOLIC BLOOD PRESSURE: 72 MMHG | TEMPERATURE: 98.2 F | HEART RATE: 60 BPM | SYSTOLIC BLOOD PRESSURE: 127 MMHG

## 2019-11-12 VITALS
HEART RATE: 66 BPM | DIASTOLIC BLOOD PRESSURE: 71 MMHG | HEIGHT: 73 IN | BODY MASS INDEX: 27.55 KG/M2 | TEMPERATURE: 97.8 F | OXYGEN SATURATION: 99 % | SYSTOLIC BLOOD PRESSURE: 137 MMHG | WEIGHT: 207.9 LBS

## 2019-11-12 DIAGNOSIS — C78.7 RECTAL CANCER METASTASIZED TO LIVER (HCC): Primary | ICD-10-CM

## 2019-11-12 DIAGNOSIS — Z09 FOLLOW UP: Primary | ICD-10-CM

## 2019-11-12 DIAGNOSIS — C20 RECTAL CANCER METASTASIZED TO LIVER (HCC): Primary | ICD-10-CM

## 2019-11-12 PROCEDURE — 96368 THER/DIAG CONCURRENT INF: CPT

## 2019-11-12 PROCEDURE — 96375 TX/PRO/DX INJ NEW DRUG ADDON: CPT

## 2019-11-12 PROCEDURE — 96415 CHEMO IV INFUSION ADDL HR: CPT

## 2019-11-12 PROCEDURE — 96413 CHEMO IV INFUSION 1 HR: CPT

## 2019-11-12 PROCEDURE — 96411 CHEMO IV PUSH ADDL DRUG: CPT

## 2019-11-12 PROCEDURE — 96417 CHEMO IV INFUS EACH ADDL SEQ: CPT

## 2019-11-12 PROCEDURE — 6360000002 HC RX W HCPCS: Performed by: INTERNAL MEDICINE

## 2019-11-12 PROCEDURE — 2580000003 HC RX 258: Performed by: INTERNAL MEDICINE

## 2019-11-12 PROCEDURE — 99214 OFFICE O/P EST MOD 30 MIN: CPT | Performed by: INTERNAL MEDICINE

## 2019-11-12 RX ORDER — METHYLPREDNISOLONE SODIUM SUCCINATE 125 MG/2ML
125 INJECTION, POWDER, LYOPHILIZED, FOR SOLUTION INTRAMUSCULAR; INTRAVENOUS ONCE
Status: CANCELLED | OUTPATIENT
Start: 2019-11-12

## 2019-11-12 RX ORDER — FLUOROURACIL 50 MG/ML
850 INJECTION, SOLUTION INTRAVENOUS ONCE
Status: COMPLETED | OUTPATIENT
Start: 2019-11-12 | End: 2019-11-12

## 2019-11-12 RX ORDER — FLUOROURACIL 50 MG/ML
850 INJECTION, SOLUTION INTRAVENOUS ONCE
Status: CANCELLED | OUTPATIENT
Start: 2019-11-12

## 2019-11-12 RX ORDER — SODIUM CHLORIDE 9 MG/ML
250 INJECTION, SOLUTION INTRAVENOUS CONTINUOUS
Status: CANCELLED | OUTPATIENT
Start: 2019-11-12

## 2019-11-12 RX ORDER — SODIUM CHLORIDE 9 MG/ML
250 INJECTION, SOLUTION INTRAVENOUS CONTINUOUS
Status: DISCONTINUED | OUTPATIENT
Start: 2019-11-12 | End: 2019-11-13 | Stop reason: HOSPADM

## 2019-11-12 RX ORDER — DEXAMETHASONE SODIUM PHOSPHATE 10 MG/ML
10 INJECTION, SOLUTION INTRAMUSCULAR; INTRAVENOUS ONCE
Status: CANCELLED | OUTPATIENT
Start: 2019-11-12

## 2019-11-12 RX ORDER — ATROPINE SULFATE 0.4 MG/ML
0.4 AMPUL (ML) INJECTION ONCE
Status: CANCELLED | OUTPATIENT
Start: 2019-11-12

## 2019-11-12 RX ORDER — SODIUM CHLORIDE 9 MG/ML
INJECTION, SOLUTION INTRAVENOUS CONTINUOUS
Status: CANCELLED | OUTPATIENT
Start: 2019-11-12

## 2019-11-12 RX ORDER — ATROPINE SULFATE 0.4 MG/ML
0.4 AMPUL (ML) INJECTION ONCE
Status: COMPLETED | OUTPATIENT
Start: 2019-11-12 | End: 2019-11-12

## 2019-11-12 RX ORDER — DEXAMETHASONE SODIUM PHOSPHATE 10 MG/ML
10 INJECTION INTRAMUSCULAR; INTRAVENOUS ONCE
Status: COMPLETED | OUTPATIENT
Start: 2019-11-12 | End: 2019-11-12

## 2019-11-12 RX ORDER — SODIUM CHLORIDE 0.9 % (FLUSH) 0.9 %
10 SYRINGE (ML) INJECTION PRN
Status: CANCELLED | OUTPATIENT
Start: 2019-11-12

## 2019-11-12 RX ORDER — HEPARIN SODIUM (PORCINE) LOCK FLUSH IV SOLN 100 UNIT/ML 100 UNIT/ML
500 SOLUTION INTRAVENOUS PRN
Status: DISCONTINUED | OUTPATIENT
Start: 2019-11-12 | End: 2019-11-13 | Stop reason: HOSPADM

## 2019-11-12 RX ORDER — 0.9 % SODIUM CHLORIDE 0.9 %
10 VIAL (ML) INJECTION ONCE
Status: CANCELLED | OUTPATIENT
Start: 2019-11-12

## 2019-11-12 RX ORDER — DIPHENHYDRAMINE HYDROCHLORIDE 50 MG/ML
50 INJECTION INTRAMUSCULAR; INTRAVENOUS ONCE
Status: CANCELLED | OUTPATIENT
Start: 2019-11-12

## 2019-11-12 RX ORDER — EPINEPHRINE 1 MG/ML
0.3 INJECTION, SOLUTION, CONCENTRATE INTRAVENOUS PRN
Status: CANCELLED | OUTPATIENT
Start: 2019-11-12

## 2019-11-12 RX ORDER — SODIUM CHLORIDE 0.9 % (FLUSH) 0.9 %
10 SYRINGE (ML) INJECTION PRN
Status: DISCONTINUED | OUTPATIENT
Start: 2019-11-12 | End: 2019-11-13 | Stop reason: HOSPADM

## 2019-11-12 RX ORDER — PALONOSETRON HYDROCHLORIDE 0.05 MG/ML
0.25 INJECTION, SOLUTION INTRAVENOUS ONCE
Status: COMPLETED | OUTPATIENT
Start: 2019-11-12 | End: 2019-11-12

## 2019-11-12 RX ORDER — PALONOSETRON HYDROCHLORIDE 0.05 MG/ML
0.25 INJECTION, SOLUTION INTRAVENOUS ONCE
Status: CANCELLED | OUTPATIENT
Start: 2019-11-12

## 2019-11-12 RX ORDER — HEPARIN SODIUM (PORCINE) LOCK FLUSH IV SOLN 100 UNIT/ML 100 UNIT/ML
500 SOLUTION INTRAVENOUS PRN
Status: CANCELLED | OUTPATIENT
Start: 2019-11-12

## 2019-11-12 RX ADMIN — ATROPINE SULFATE 0.4 MG: 0.4 INJECTION, SOLUTION INTRAMUSCULAR; INTRAVENOUS; SUBCUTANEOUS at 09:59

## 2019-11-12 RX ADMIN — DEXAMETHASONE SODIUM PHOSPHATE 10 MG: 10 INJECTION INTRAMUSCULAR; INTRAVENOUS at 10:03

## 2019-11-12 RX ADMIN — SODIUM CHLORIDE 250 ML: 9 INJECTION, SOLUTION INTRAVENOUS at 09:55

## 2019-11-12 RX ADMIN — PALONOSETRON 0.25 MG: 0.25 INJECTION, SOLUTION INTRAVENOUS at 09:56

## 2019-11-12 RX ADMIN — FLUOROURACIL 850 MG: 50 INJECTION, SOLUTION INTRAVENOUS at 12:51

## 2019-11-12 RX ADMIN — Medication 10 ML: at 12:55

## 2019-11-12 RX ADMIN — Medication 500 UNITS: at 12:55

## 2019-11-12 RX ADMIN — SODIUM CHLORIDE 400 MG: 9 INJECTION, SOLUTION INTRAVENOUS at 10:50

## 2019-11-12 RX ADMIN — LEUCOVORIN CALCIUM 850 MG: 10 INJECTION INTRAMUSCULAR; INTRAVENOUS at 10:50

## 2019-11-12 RX ADMIN — BEVACIZUMAB 400 MG: 400 INJECTION, SOLUTION INTRAVENOUS at 10:15

## 2019-11-14 ENCOUNTER — HOSPITAL ENCOUNTER (OUTPATIENT)
Dept: INFUSION THERAPY | Age: 69
Discharge: HOME | End: 2019-11-14
Payer: MEDICARE

## 2019-11-14 DIAGNOSIS — Z51.11 ENCOUNTER FOR ANTINEOPLASTIC CHEMOTHERAPY: Primary | ICD-10-CM

## 2019-11-14 DIAGNOSIS — C78.7 RECTAL CANCER METASTASIZED TO LIVER (HCC): ICD-10-CM

## 2019-11-14 DIAGNOSIS — C20 RECTAL CANCER METASTASIZED TO LIVER (HCC): ICD-10-CM

## 2019-11-14 PROCEDURE — 6360000002 HC RX W HCPCS: Performed by: INTERNAL MEDICINE

## 2019-11-14 PROCEDURE — 96372 THER/PROPH/DIAG INJ SC/IM: CPT

## 2019-11-14 RX ADMIN — PEGFILGRASTIM 6 MG: 6 INJECTION SUBCUTANEOUS at 14:29

## 2019-12-02 ENCOUNTER — HOSPITAL ENCOUNTER (OUTPATIENT)
Age: 69
Discharge: HOME | End: 2019-12-02
Payer: MEDICARE

## 2019-12-02 DIAGNOSIS — C20 RECTAL CANCER METASTASIZED TO LIVER (HCC): ICD-10-CM

## 2019-12-02 DIAGNOSIS — C79.51 BONE METASTASIS (HCC): ICD-10-CM

## 2019-12-02 DIAGNOSIS — C78.7 RECTAL CANCER METASTASIZED TO LIVER (HCC): ICD-10-CM

## 2019-12-02 LAB
ALBUMIN SERPL-MCNC: 3.4 G/DL (ref 3.5–5.2)
ALP BLD-CCNC: 163 U/L (ref 40–129)
ALT SERPL-CCNC: 15 U/L (ref 0–40)
ANION GAP SERPL CALCULATED.3IONS-SCNC: 10 MMOL/L (ref 7–16)
ANISOCYTOSIS: ABNORMAL
AST SERPL-CCNC: 25 U/L (ref 0–39)
BASOPHILS ABSOLUTE: 0.06 E9/L (ref 0–0.2)
BASOPHILS RELATIVE PERCENT: 0.9 % (ref 0–2)
BILIRUB SERPL-MCNC: 0.9 MG/DL (ref 0–1.2)
BUN BLDV-MCNC: 16 MG/DL (ref 8–23)
CALCIUM SERPL-MCNC: 9.4 MG/DL (ref 8.6–10.2)
CEA: 4.2 NG/ML (ref 0–5.2)
CHLORIDE BLD-SCNC: 101 MMOL/L (ref 98–107)
CO2: 26 MMOL/L (ref 22–29)
CREAT SERPL-MCNC: 1.2 MG/DL (ref 0.7–1.2)
EOSINOPHILS ABSOLUTE: 0.11 E9/L (ref 0.05–0.5)
EOSINOPHILS RELATIVE PERCENT: 1.7 % (ref 0–6)
GFR AFRICAN AMERICAN: >60
GFR NON-AFRICAN AMERICAN: 60 ML/MIN/1.73
GLUCOSE BLD-MCNC: 154 MG/DL (ref 74–99)
HCT VFR BLD CALC: 29 % (ref 37–54)
HEMOGLOBIN: 9.2 G/DL (ref 12.5–16.5)
LYMPHOCYTES ABSOLUTE: 0.4 E9/L (ref 1.5–4)
LYMPHOCYTES RELATIVE PERCENT: 6.1 % (ref 20–42)
MCH RBC QN AUTO: 31.6 PG (ref 26–35)
MCHC RBC AUTO-ENTMCNC: 31.7 % (ref 32–34.5)
MCV RBC AUTO: 99.7 FL (ref 80–99.9)
MONOCYTES ABSOLUTE: 0.26 E9/L (ref 0.1–0.95)
MONOCYTES RELATIVE PERCENT: 4.3 % (ref 2–12)
NEUTROPHILS ABSOLUTE: 5.74 E9/L (ref 1.8–7.3)
NEUTROPHILS RELATIVE PERCENT: 87 % (ref 43–80)
OVALOCYTES: ABNORMAL
PDW BLD-RTO: 18.9 FL (ref 11.5–15)
PLATELET # BLD: 131 E9/L (ref 130–450)
PMV BLD AUTO: 10.5 FL (ref 7–12)
POIKILOCYTES: ABNORMAL
POLYCHROMASIA: ABNORMAL
POTASSIUM SERPL-SCNC: 4 MMOL/L (ref 3.5–5)
RBC # BLD: 2.91 E12/L (ref 3.8–5.8)
SCHISTOCYTES: ABNORMAL
SODIUM BLD-SCNC: 137 MMOL/L (ref 132–146)
TEAR DROP CELLS: ABNORMAL
TOTAL PROTEIN: 6.3 G/DL (ref 6.4–8.3)
WBC # BLD: 6.6 E9/L (ref 4.5–11.5)

## 2019-12-02 PROCEDURE — 85025 COMPLETE CBC W/AUTO DIFF WBC: CPT

## 2019-12-02 PROCEDURE — 80053 COMPREHEN METABOLIC PANEL: CPT

## 2019-12-02 PROCEDURE — 82378 CARCINOEMBRYONIC ANTIGEN: CPT

## 2019-12-02 PROCEDURE — 36415 COLL VENOUS BLD VENIPUNCTURE: CPT

## 2019-12-03 ENCOUNTER — OFFICE VISIT (OUTPATIENT)
Dept: ONCOLOGY | Age: 69
End: 2019-12-03
Payer: MEDICARE

## 2019-12-03 ENCOUNTER — HOSPITAL ENCOUNTER (OUTPATIENT)
Dept: INFUSION THERAPY | Age: 69
Discharge: HOME | End: 2019-12-03
Payer: MEDICARE

## 2019-12-03 VITALS
HEIGHT: 73 IN | HEART RATE: 73 BPM | RESPIRATION RATE: 16 BRPM | DIASTOLIC BLOOD PRESSURE: 68 MMHG | BODY MASS INDEX: 27.95 KG/M2 | OXYGEN SATURATION: 99 % | SYSTOLIC BLOOD PRESSURE: 113 MMHG | WEIGHT: 210.9 LBS | TEMPERATURE: 98.9 F

## 2019-12-03 DIAGNOSIS — Z09 FOLLOW UP: Primary | ICD-10-CM

## 2019-12-03 PROCEDURE — 99214 OFFICE O/P EST MOD 30 MIN: CPT

## 2019-12-03 PROCEDURE — 99212 OFFICE O/P EST SF 10 MIN: CPT

## 2019-12-03 PROCEDURE — 99214 OFFICE O/P EST MOD 30 MIN: CPT | Performed by: INTERNAL MEDICINE

## 2019-12-09 ENCOUNTER — HOSPITAL ENCOUNTER (OUTPATIENT)
Age: 69
Discharge: HOME | End: 2019-12-09
Payer: MEDICARE

## 2019-12-09 DIAGNOSIS — C79.51 BONE METASTASIS (HCC): ICD-10-CM

## 2019-12-09 DIAGNOSIS — C78.7 RECTAL CANCER METASTASIZED TO LIVER (HCC): ICD-10-CM

## 2019-12-09 DIAGNOSIS — C20 RECTAL CANCER METASTASIZED TO LIVER (HCC): ICD-10-CM

## 2019-12-09 LAB
ALBUMIN SERPL-MCNC: 3.4 G/DL (ref 3.5–5.2)
ALP BLD-CCNC: 175 U/L (ref 40–129)
ALT SERPL-CCNC: 13 U/L (ref 0–40)
ANION GAP SERPL CALCULATED.3IONS-SCNC: 10 MMOL/L (ref 7–16)
ANISOCYTOSIS: ABNORMAL
AST SERPL-CCNC: 25 U/L (ref 0–39)
BASOPHILS ABSOLUTE: 0 E9/L (ref 0–0.2)
BASOPHILS RELATIVE PERCENT: 0.9 % (ref 0–2)
BILIRUB SERPL-MCNC: 0.5 MG/DL (ref 0–1.2)
BUN BLDV-MCNC: 16 MG/DL (ref 8–23)
CALCIUM SERPL-MCNC: 9.2 MG/DL (ref 8.6–10.2)
CEA: 4 NG/ML (ref 0–5.2)
CHLORIDE BLD-SCNC: 106 MMOL/L (ref 98–107)
CO2: 25 MMOL/L (ref 22–29)
CREAT SERPL-MCNC: 1.1 MG/DL (ref 0.7–1.2)
EOSINOPHILS ABSOLUTE: 0 E9/L (ref 0.05–0.5)
EOSINOPHILS RELATIVE PERCENT: 2.2 % (ref 0–6)
GFR AFRICAN AMERICAN: >60
GFR NON-AFRICAN AMERICAN: >60 ML/MIN/1.73
GLUCOSE BLD-MCNC: 126 MG/DL (ref 74–99)
HCT VFR BLD CALC: 28.4 % (ref 37–54)
HEMOGLOBIN: 8.7 G/DL (ref 12.5–16.5)
LYMPHOCYTES ABSOLUTE: 0.67 E9/L (ref 1.5–4)
LYMPHOCYTES RELATIVE PERCENT: 12.2 % (ref 20–42)
MCH RBC QN AUTO: 31.2 PG (ref 26–35)
MCHC RBC AUTO-ENTMCNC: 30.6 % (ref 32–34.5)
MCV RBC AUTO: 101.8 FL (ref 80–99.9)
MONOCYTES ABSOLUTE: 0.11 E9/L (ref 0.1–0.95)
MONOCYTES RELATIVE PERCENT: 1.7 % (ref 2–12)
NEUTROPHILS ABSOLUTE: 4.82 E9/L (ref 1.8–7.3)
NEUTROPHILS RELATIVE PERCENT: 86.1 % (ref 43–80)
OVALOCYTES: ABNORMAL
PDW BLD-RTO: 18.6 FL (ref 11.5–15)
PLATELET # BLD: 164 E9/L (ref 130–450)
PMV BLD AUTO: 10.9 FL (ref 7–12)
POIKILOCYTES: ABNORMAL
POLYCHROMASIA: ABNORMAL
POTASSIUM SERPL-SCNC: 4.3 MMOL/L (ref 3.5–5)
RBC # BLD: 2.79 E12/L (ref 3.8–5.8)
SODIUM BLD-SCNC: 141 MMOL/L (ref 132–146)
TEAR DROP CELLS: ABNORMAL
TOTAL PROTEIN: 6.5 G/DL (ref 6.4–8.3)
WBC # BLD: 5.6 E9/L (ref 4.5–11.5)

## 2019-12-09 PROCEDURE — 85025 COMPLETE CBC W/AUTO DIFF WBC: CPT

## 2019-12-09 PROCEDURE — 80053 COMPREHEN METABOLIC PANEL: CPT

## 2019-12-09 PROCEDURE — 82378 CARCINOEMBRYONIC ANTIGEN: CPT

## 2019-12-09 PROCEDURE — 36415 COLL VENOUS BLD VENIPUNCTURE: CPT

## 2019-12-10 ENCOUNTER — TELEPHONE (OUTPATIENT)
Dept: INFUSION THERAPY | Age: 69
End: 2019-12-10

## 2019-12-10 ENCOUNTER — HOSPITAL ENCOUNTER (OUTPATIENT)
Dept: INFUSION THERAPY | Age: 69
Discharge: HOME | End: 2019-12-10
Payer: MEDICARE

## 2019-12-10 ENCOUNTER — OFFICE VISIT (OUTPATIENT)
Dept: ONCOLOGY | Age: 69
End: 2019-12-10
Payer: MEDICARE

## 2019-12-10 VITALS
BODY MASS INDEX: 28.36 KG/M2 | TEMPERATURE: 97.8 F | HEART RATE: 72 BPM | SYSTOLIC BLOOD PRESSURE: 138 MMHG | WEIGHT: 214 LBS | DIASTOLIC BLOOD PRESSURE: 73 MMHG | OXYGEN SATURATION: 98 % | HEIGHT: 73 IN

## 2019-12-10 VITALS — TEMPERATURE: 97.6 F | DIASTOLIC BLOOD PRESSURE: 68 MMHG | HEART RATE: 74 BPM | SYSTOLIC BLOOD PRESSURE: 142 MMHG

## 2019-12-10 DIAGNOSIS — C78.7 RECTAL CANCER METASTASIZED TO LIVER (HCC): Primary | ICD-10-CM

## 2019-12-10 DIAGNOSIS — C20 RECTAL CANCER METASTASIZED TO LIVER (HCC): Primary | ICD-10-CM

## 2019-12-10 DIAGNOSIS — C79.51 BONE METASTASIS (HCC): ICD-10-CM

## 2019-12-10 PROCEDURE — 96413 CHEMO IV INFUSION 1 HR: CPT

## 2019-12-10 PROCEDURE — 99214 OFFICE O/P EST MOD 30 MIN: CPT | Performed by: INTERNAL MEDICINE

## 2019-12-10 PROCEDURE — 96411 CHEMO IV PUSH ADDL DRUG: CPT

## 2019-12-10 PROCEDURE — 2580000003 HC RX 258: Performed by: INTERNAL MEDICINE

## 2019-12-10 PROCEDURE — 96417 CHEMO IV INFUS EACH ADDL SEQ: CPT

## 2019-12-10 PROCEDURE — 96415 CHEMO IV INFUSION ADDL HR: CPT

## 2019-12-10 PROCEDURE — 96375 TX/PRO/DX INJ NEW DRUG ADDON: CPT

## 2019-12-10 PROCEDURE — 6360000002 HC RX W HCPCS: Performed by: INTERNAL MEDICINE

## 2019-12-10 PROCEDURE — 96368 THER/DIAG CONCURRENT INF: CPT

## 2019-12-10 PROCEDURE — 96372 THER/PROPH/DIAG INJ SC/IM: CPT

## 2019-12-10 RX ORDER — FLUOROURACIL 50 MG/ML
850 INJECTION, SOLUTION INTRAVENOUS ONCE
Status: COMPLETED | OUTPATIENT
Start: 2019-12-10 | End: 2019-12-10

## 2019-12-10 RX ORDER — DIPHENHYDRAMINE HYDROCHLORIDE 50 MG/ML
50 INJECTION INTRAMUSCULAR; INTRAVENOUS ONCE
Status: CANCELLED | OUTPATIENT
Start: 2019-12-10

## 2019-12-10 RX ORDER — PALONOSETRON HYDROCHLORIDE 0.05 MG/ML
0.25 INJECTION, SOLUTION INTRAVENOUS ONCE
Status: COMPLETED | OUTPATIENT
Start: 2019-12-10 | End: 2019-12-10

## 2019-12-10 RX ORDER — DEXAMETHASONE SODIUM PHOSPHATE 10 MG/ML
10 INJECTION, SOLUTION INTRAMUSCULAR; INTRAVENOUS ONCE
Status: CANCELLED | OUTPATIENT
Start: 2019-12-10

## 2019-12-10 RX ORDER — SODIUM CHLORIDE 0.9 % (FLUSH) 0.9 %
10 SYRINGE (ML) INJECTION PRN
Status: DISCONTINUED | OUTPATIENT
Start: 2019-12-10 | End: 2019-12-11 | Stop reason: HOSPADM

## 2019-12-10 RX ORDER — HEPARIN SODIUM (PORCINE) LOCK FLUSH IV SOLN 100 UNIT/ML 100 UNIT/ML
500 SOLUTION INTRAVENOUS PRN
Status: CANCELLED | OUTPATIENT
Start: 2019-12-10

## 2019-12-10 RX ORDER — HEPARIN SODIUM (PORCINE) LOCK FLUSH IV SOLN 100 UNIT/ML 100 UNIT/ML
500 SOLUTION INTRAVENOUS PRN
Status: DISCONTINUED | OUTPATIENT
Start: 2019-12-10 | End: 2019-12-11 | Stop reason: HOSPADM

## 2019-12-10 RX ORDER — SODIUM CHLORIDE 9 MG/ML
INJECTION, SOLUTION INTRAVENOUS CONTINUOUS
Status: CANCELLED | OUTPATIENT
Start: 2019-12-10

## 2019-12-10 RX ORDER — SODIUM CHLORIDE 9 MG/ML
250 INJECTION, SOLUTION INTRAVENOUS CONTINUOUS
Status: DISCONTINUED | OUTPATIENT
Start: 2019-12-10 | End: 2019-12-11 | Stop reason: HOSPADM

## 2019-12-10 RX ORDER — SODIUM CHLORIDE 0.9 % (FLUSH) 0.9 %
10 SYRINGE (ML) INJECTION PRN
Status: CANCELLED | OUTPATIENT
Start: 2019-12-10

## 2019-12-10 RX ORDER — ATROPINE SULFATE 0.4 MG/ML
0.4 AMPUL (ML) INJECTION ONCE
Status: CANCELLED | OUTPATIENT
Start: 2019-12-10

## 2019-12-10 RX ORDER — DEXAMETHASONE SODIUM PHOSPHATE 10 MG/ML
10 INJECTION INTRAMUSCULAR; INTRAVENOUS ONCE
Status: COMPLETED | OUTPATIENT
Start: 2019-12-10 | End: 2019-12-10

## 2019-12-10 RX ORDER — METHYLPREDNISOLONE SODIUM SUCCINATE 125 MG/2ML
125 INJECTION, POWDER, LYOPHILIZED, FOR SOLUTION INTRAMUSCULAR; INTRAVENOUS ONCE
Status: CANCELLED | OUTPATIENT
Start: 2019-12-10

## 2019-12-10 RX ORDER — ATROPINE SULFATE 0.4 MG/ML
0.4 AMPUL (ML) INJECTION ONCE
Status: COMPLETED | OUTPATIENT
Start: 2019-12-10 | End: 2019-12-10

## 2019-12-10 RX ORDER — 0.9 % SODIUM CHLORIDE 0.9 %
10 VIAL (ML) INJECTION ONCE
Status: CANCELLED | OUTPATIENT
Start: 2019-12-10

## 2019-12-10 RX ORDER — EPINEPHRINE 1 MG/ML
0.3 INJECTION, SOLUTION, CONCENTRATE INTRAVENOUS PRN
Status: CANCELLED | OUTPATIENT
Start: 2019-12-10

## 2019-12-10 RX ORDER — FLUOROURACIL 50 MG/ML
850 INJECTION, SOLUTION INTRAVENOUS ONCE
Status: CANCELLED | OUTPATIENT
Start: 2019-12-10

## 2019-12-10 RX ORDER — SODIUM CHLORIDE 9 MG/ML
250 INJECTION, SOLUTION INTRAVENOUS CONTINUOUS
Status: CANCELLED | OUTPATIENT
Start: 2019-12-10

## 2019-12-10 RX ORDER — AMOXICILLIN AND CLAVULANATE POTASSIUM 500; 125 MG/1; MG/1
500 TABLET, FILM COATED ORAL 2 TIMES DAILY
COMMUNITY
Start: 2019-12-03 | End: 2020-03-05

## 2019-12-10 RX ORDER — PALONOSETRON HYDROCHLORIDE 0.05 MG/ML
0.25 INJECTION, SOLUTION INTRAVENOUS ONCE
Status: CANCELLED | OUTPATIENT
Start: 2019-12-10

## 2019-12-10 RX ADMIN — SODIUM CHLORIDE 400 MG: 9 INJECTION, SOLUTION INTRAVENOUS at 10:45

## 2019-12-10 RX ADMIN — Medication 500 UNITS: at 12:35

## 2019-12-10 RX ADMIN — ATROPINE SULFATE 0.4 MG: 0.4 INJECTION, SOLUTION INTRAMUSCULAR; INTRAVENOUS; SUBCUTANEOUS at 09:55

## 2019-12-10 RX ADMIN — SODIUM CHLORIDE 250 ML: 9 INJECTION, SOLUTION INTRAVENOUS at 09:30

## 2019-12-10 RX ADMIN — BEVACIZUMAB 400 MG: 400 INJECTION, SOLUTION INTRAVENOUS at 10:04

## 2019-12-10 RX ADMIN — LEUCOVORIN CALCIUM 850 MG: 10 INJECTION INTRAMUSCULAR; INTRAVENOUS at 10:45

## 2019-12-10 RX ADMIN — PALONOSETRON 0.25 MG: 0.25 INJECTION, SOLUTION INTRAVENOUS at 09:36

## 2019-12-10 RX ADMIN — FLUOROURACIL 850 MG: 50 INJECTION, SOLUTION INTRAVENOUS at 12:30

## 2019-12-10 RX ADMIN — DEXAMETHASONE SODIUM PHOSPHATE 10 MG: 10 INJECTION INTRAMUSCULAR; INTRAVENOUS at 09:40

## 2019-12-10 RX ADMIN — Medication 10 ML: at 12:35

## 2019-12-10 RX ADMIN — DENOSUMAB 120 MG: 120 INJECTION SUBCUTANEOUS at 10:20

## 2019-12-13 ENCOUNTER — HOSPITAL ENCOUNTER (OUTPATIENT)
Dept: INFUSION THERAPY | Age: 69
Discharge: HOME | End: 2019-12-13
Payer: MEDICARE

## 2019-12-13 DIAGNOSIS — C20 RECTAL CANCER METASTASIZED TO LIVER (HCC): ICD-10-CM

## 2019-12-13 DIAGNOSIS — C78.7 RECTAL CANCER METASTASIZED TO LIVER (HCC): ICD-10-CM

## 2019-12-13 DIAGNOSIS — Z51.11 ENCOUNTER FOR ANTINEOPLASTIC CHEMOTHERAPY: Primary | ICD-10-CM

## 2019-12-13 PROCEDURE — 6360000002 HC RX W HCPCS: Performed by: INTERNAL MEDICINE

## 2019-12-13 PROCEDURE — 96372 THER/PROPH/DIAG INJ SC/IM: CPT

## 2019-12-13 RX ORDER — HEPARIN SODIUM (PORCINE) LOCK FLUSH IV SOLN 100 UNIT/ML 100 UNIT/ML
500 SOLUTION INTRAVENOUS PRN
Status: CANCELLED | OUTPATIENT
Start: 2019-12-13

## 2019-12-13 RX ORDER — SODIUM CHLORIDE 0.9 % (FLUSH) 0.9 %
10 SYRINGE (ML) INJECTION PRN
Status: CANCELLED | OUTPATIENT
Start: 2019-12-13

## 2019-12-13 RX ADMIN — PEGFILGRASTIM 6 MG: 6 INJECTION SUBCUTANEOUS at 15:27

## 2019-12-19 ENCOUNTER — HOSPITAL ENCOUNTER (OUTPATIENT)
Dept: CT IMAGING | Age: 69
Discharge: HOME | End: 2019-12-19
Payer: MEDICARE

## 2019-12-19 DIAGNOSIS — C78.7 RECTAL CANCER METASTASIZED TO LIVER (HCC): ICD-10-CM

## 2019-12-19 DIAGNOSIS — C20 RECTAL CANCER METASTASIZED TO LIVER (HCC): ICD-10-CM

## 2019-12-19 PROCEDURE — 6360000004 HC RX CONTRAST MEDICATION: Performed by: RADIOLOGY

## 2019-12-19 PROCEDURE — 71260 CT THORAX DX C+: CPT

## 2019-12-19 PROCEDURE — 74177 CT ABD & PELVIS W/CONTRAST: CPT

## 2019-12-19 RX ADMIN — IOHEXOL 50 ML: 240 INJECTION, SOLUTION INTRATHECAL; INTRAVASCULAR; INTRAVENOUS; ORAL at 08:34

## 2019-12-19 RX ADMIN — IOPAMIDOL 80 ML: 755 INJECTION, SOLUTION INTRAVENOUS at 08:34

## 2019-12-23 ENCOUNTER — TELEPHONE (OUTPATIENT)
Dept: INFUSION THERAPY | Age: 69
End: 2019-12-23

## 2020-01-06 ENCOUNTER — HOSPITAL ENCOUNTER (OUTPATIENT)
Age: 70
Discharge: HOME | End: 2020-01-06
Payer: MEDICARE

## 2020-01-06 LAB
ALBUMIN SERPL-MCNC: 3.7 G/DL (ref 3.5–5.2)
ALP BLD-CCNC: 145 U/L (ref 40–129)
ALT SERPL-CCNC: 23 U/L (ref 0–40)
ANION GAP SERPL CALCULATED.3IONS-SCNC: 11 MMOL/L (ref 7–16)
AST SERPL-CCNC: 36 U/L (ref 0–39)
BASOPHILS ABSOLUTE: 0.04 E9/L (ref 0–0.2)
BASOPHILS RELATIVE PERCENT: 0.7 % (ref 0–2)
BILIRUB SERPL-MCNC: 1.1 MG/DL (ref 0–1.2)
BUN BLDV-MCNC: 23 MG/DL (ref 8–23)
CALCIUM SERPL-MCNC: 9.5 MG/DL (ref 8.6–10.2)
CEA: 3.8 NG/ML (ref 0–5.2)
CHLORIDE BLD-SCNC: 108 MMOL/L (ref 98–107)
CO2: 25 MMOL/L (ref 22–29)
CREAT SERPL-MCNC: 1.1 MG/DL (ref 0.7–1.2)
EOSINOPHILS ABSOLUTE: 0.14 E9/L (ref 0.05–0.5)
EOSINOPHILS RELATIVE PERCENT: 2.6 % (ref 0–6)
GFR AFRICAN AMERICAN: >60
GFR NON-AFRICAN AMERICAN: >60 ML/MIN/1.73
GLUCOSE BLD-MCNC: 95 MG/DL (ref 74–99)
HCT VFR BLD CALC: 30.4 % (ref 37–54)
HEMOGLOBIN: 9.5 G/DL (ref 12.5–16.5)
IMMATURE GRANULOCYTES #: 0.03 E9/L
IMMATURE GRANULOCYTES %: 0.6 % (ref 0–5)
LYMPHOCYTES ABSOLUTE: 0.71 E9/L (ref 1.5–4)
LYMPHOCYTES RELATIVE PERCENT: 13.2 % (ref 20–42)
MCH RBC QN AUTO: 31.5 PG (ref 26–35)
MCHC RBC AUTO-ENTMCNC: 31.3 % (ref 32–34.5)
MCV RBC AUTO: 100.7 FL (ref 80–99.9)
MONOCYTES ABSOLUTE: 0.49 E9/L (ref 0.1–0.95)
MONOCYTES RELATIVE PERCENT: 9.1 % (ref 2–12)
NEUTROPHILS ABSOLUTE: 3.97 E9/L (ref 1.8–7.3)
NEUTROPHILS RELATIVE PERCENT: 73.8 % (ref 43–80)
PDW BLD-RTO: 18.5 FL (ref 11.5–15)
PLATELET # BLD: 122 E9/L (ref 130–450)
PMV BLD AUTO: 11.2 FL (ref 7–12)
POTASSIUM SERPL-SCNC: 4.2 MMOL/L (ref 3.5–5)
RBC # BLD: 3.02 E12/L (ref 3.8–5.8)
SODIUM BLD-SCNC: 144 MMOL/L (ref 132–146)
TOTAL PROTEIN: 6.8 G/DL (ref 6.4–8.3)
WBC # BLD: 5.4 E9/L (ref 4.5–11.5)

## 2020-01-06 PROCEDURE — 80053 COMPREHEN METABOLIC PANEL: CPT

## 2020-01-06 PROCEDURE — 85025 COMPLETE CBC W/AUTO DIFF WBC: CPT

## 2020-01-06 PROCEDURE — 82378 CARCINOEMBRYONIC ANTIGEN: CPT

## 2020-01-06 PROCEDURE — 36415 COLL VENOUS BLD VENIPUNCTURE: CPT

## 2020-01-07 ENCOUNTER — OFFICE VISIT (OUTPATIENT)
Dept: ONCOLOGY | Age: 70
End: 2020-01-07
Payer: MEDICARE

## 2020-01-07 ENCOUNTER — TELEPHONE (OUTPATIENT)
Dept: INFUSION THERAPY | Age: 70
End: 2020-01-07

## 2020-01-07 ENCOUNTER — HOSPITAL ENCOUNTER (OUTPATIENT)
Dept: INFUSION THERAPY | Age: 70
Discharge: HOME | End: 2020-01-07
Payer: MEDICARE

## 2020-01-07 VITALS
BODY MASS INDEX: 27.28 KG/M2 | HEIGHT: 73 IN | HEART RATE: 69 BPM | WEIGHT: 205.8 LBS | TEMPERATURE: 98.1 F | SYSTOLIC BLOOD PRESSURE: 116 MMHG | DIASTOLIC BLOOD PRESSURE: 62 MMHG

## 2020-01-07 DIAGNOSIS — C78.7 RECTAL CANCER METASTASIZED TO LIVER (HCC): Primary | ICD-10-CM

## 2020-01-07 DIAGNOSIS — C20 RECTAL CANCER METASTASIZED TO LIVER (HCC): Primary | ICD-10-CM

## 2020-01-07 PROCEDURE — 6360000002 HC RX W HCPCS: Performed by: INTERNAL MEDICINE

## 2020-01-07 PROCEDURE — 2580000003 HC RX 258: Performed by: INTERNAL MEDICINE

## 2020-01-07 PROCEDURE — 96417 CHEMO IV INFUS EACH ADDL SEQ: CPT

## 2020-01-07 PROCEDURE — 96411 CHEMO IV PUSH ADDL DRUG: CPT

## 2020-01-07 PROCEDURE — 96375 TX/PRO/DX INJ NEW DRUG ADDON: CPT

## 2020-01-07 PROCEDURE — 96413 CHEMO IV INFUSION 1 HR: CPT

## 2020-01-07 PROCEDURE — 96368 THER/DIAG CONCURRENT INF: CPT

## 2020-01-07 PROCEDURE — 99214 OFFICE O/P EST MOD 30 MIN: CPT | Performed by: INTERNAL MEDICINE

## 2020-01-07 PROCEDURE — 96415 CHEMO IV INFUSION ADDL HR: CPT

## 2020-01-07 RX ORDER — 0.9 % SODIUM CHLORIDE 0.9 %
10 VIAL (ML) INJECTION ONCE
Status: CANCELLED | OUTPATIENT
Start: 2020-01-07

## 2020-01-07 RX ORDER — SODIUM CHLORIDE 9 MG/ML
250 INJECTION, SOLUTION INTRAVENOUS CONTINUOUS
Status: DISCONTINUED | OUTPATIENT
Start: 2020-01-07 | End: 2020-01-08 | Stop reason: HOSPADM

## 2020-01-07 RX ORDER — SODIUM CHLORIDE 0.9 % (FLUSH) 0.9 %
10 SYRINGE (ML) INJECTION PRN
Status: CANCELLED | OUTPATIENT
Start: 2020-01-07

## 2020-01-07 RX ORDER — BROMPHENIRAMINE MALEATE, PSEUDOEPHEDRINE HYDROCHLORIDE, AND DEXTROMETHORPHAN HYDROBROMIDE 2; 30; 10 MG/5ML; MG/5ML; MG/5ML
2.5 SYRUP ORAL 4 TIMES DAILY PRN
COMMUNITY
Start: 2019-12-23 | End: 2020-03-05

## 2020-01-07 RX ORDER — SODIUM CHLORIDE 0.9 % (FLUSH) 0.9 %
10 SYRINGE (ML) INJECTION PRN
Status: DISCONTINUED | OUTPATIENT
Start: 2020-01-07 | End: 2020-01-08 | Stop reason: HOSPADM

## 2020-01-07 RX ORDER — DEXAMETHASONE SODIUM PHOSPHATE 10 MG/ML
10 INJECTION, SOLUTION INTRAMUSCULAR; INTRAVENOUS ONCE
Status: CANCELLED | OUTPATIENT
Start: 2020-01-07

## 2020-01-07 RX ORDER — FLUOROURACIL 50 MG/ML
850 INJECTION, SOLUTION INTRAVENOUS ONCE
Status: COMPLETED | OUTPATIENT
Start: 2020-01-07 | End: 2020-01-07

## 2020-01-07 RX ORDER — ATROPINE SULFATE 0.4 MG/ML
0.4 AMPUL (ML) INJECTION ONCE
Status: COMPLETED | OUTPATIENT
Start: 2020-01-07 | End: 2020-01-07

## 2020-01-07 RX ORDER — ATROPINE SULFATE 0.4 MG/ML
0.4 AMPUL (ML) INJECTION ONCE
Status: CANCELLED | OUTPATIENT
Start: 2020-01-07

## 2020-01-07 RX ORDER — PALONOSETRON HYDROCHLORIDE 0.05 MG/ML
0.25 INJECTION, SOLUTION INTRAVENOUS ONCE
Status: CANCELLED | OUTPATIENT
Start: 2020-01-07

## 2020-01-07 RX ORDER — HEPARIN SODIUM (PORCINE) LOCK FLUSH IV SOLN 100 UNIT/ML 100 UNIT/ML
500 SOLUTION INTRAVENOUS PRN
Status: CANCELLED | OUTPATIENT
Start: 2020-01-07

## 2020-01-07 RX ORDER — HEPARIN SODIUM (PORCINE) LOCK FLUSH IV SOLN 100 UNIT/ML 100 UNIT/ML
500 SOLUTION INTRAVENOUS PRN
Status: DISCONTINUED | OUTPATIENT
Start: 2020-01-07 | End: 2020-01-08 | Stop reason: HOSPADM

## 2020-01-07 RX ORDER — PALONOSETRON HYDROCHLORIDE 0.05 MG/ML
0.25 INJECTION, SOLUTION INTRAVENOUS ONCE
Status: COMPLETED | OUTPATIENT
Start: 2020-01-07 | End: 2020-01-07

## 2020-01-07 RX ORDER — DIPHENHYDRAMINE HYDROCHLORIDE 50 MG/ML
50 INJECTION INTRAMUSCULAR; INTRAVENOUS ONCE
Status: CANCELLED | OUTPATIENT
Start: 2020-01-07

## 2020-01-07 RX ORDER — METHYLPREDNISOLONE SODIUM SUCCINATE 125 MG/2ML
125 INJECTION, POWDER, LYOPHILIZED, FOR SOLUTION INTRAMUSCULAR; INTRAVENOUS ONCE
Status: CANCELLED | OUTPATIENT
Start: 2020-01-07

## 2020-01-07 RX ORDER — FLUOROURACIL 50 MG/ML
850 INJECTION, SOLUTION INTRAVENOUS ONCE
Status: CANCELLED | OUTPATIENT
Start: 2020-01-07

## 2020-01-07 RX ORDER — SODIUM CHLORIDE 9 MG/ML
250 INJECTION, SOLUTION INTRAVENOUS CONTINUOUS
Status: CANCELLED | OUTPATIENT
Start: 2020-01-07

## 2020-01-07 RX ORDER — DEXAMETHASONE SODIUM PHOSPHATE 10 MG/ML
10 INJECTION INTRAMUSCULAR; INTRAVENOUS ONCE
Status: COMPLETED | OUTPATIENT
Start: 2020-01-07 | End: 2020-01-07

## 2020-01-07 RX ORDER — SODIUM CHLORIDE 9 MG/ML
INJECTION, SOLUTION INTRAVENOUS CONTINUOUS
Status: CANCELLED | OUTPATIENT
Start: 2020-01-07

## 2020-01-07 RX ORDER — EPINEPHRINE 1 MG/ML
0.3 INJECTION, SOLUTION, CONCENTRATE INTRAVENOUS PRN
Status: CANCELLED | OUTPATIENT
Start: 2020-01-07

## 2020-01-07 RX ADMIN — LEUCOVORIN CALCIUM 850 MG: 10 INJECTION INTRAMUSCULAR; INTRAVENOUS at 10:39

## 2020-01-07 RX ADMIN — Medication 500 UNITS: at 12:39

## 2020-01-07 RX ADMIN — DEXAMETHASONE SODIUM PHOSPHATE 10 MG: 10 INJECTION INTRAMUSCULAR; INTRAVENOUS at 09:36

## 2020-01-07 RX ADMIN — ATROPINE SULFATE 0.4 MG: 0.4 INJECTION, SOLUTION INTRAMUSCULAR; INTRAVENOUS; SUBCUTANEOUS at 10:40

## 2020-01-07 RX ADMIN — PALONOSETRON 0.25 MG: 0.25 INJECTION, SOLUTION INTRAVENOUS at 09:36

## 2020-01-07 RX ADMIN — BEVACIZUMAB 400 MG: 400 INJECTION, SOLUTION INTRAVENOUS at 09:54

## 2020-01-07 RX ADMIN — Medication 10 ML: at 12:39

## 2020-01-07 RX ADMIN — FLUOROURACIL 850 MG: 50 INJECTION, SOLUTION INTRAVENOUS at 12:33

## 2020-01-07 RX ADMIN — SODIUM CHLORIDE 250 ML: 9 INJECTION, SOLUTION INTRAVENOUS at 09:22

## 2020-01-07 RX ADMIN — SODIUM CHLORIDE 400 MG: 9 INJECTION, SOLUTION INTRAVENOUS at 10:39

## 2020-01-07 NOTE — PROGRESS NOTES
3488. Bone scan on 11/10/2015 noted no metastatic disease. CT chest on 11/10/2015 revealed Multiple pulmonary nodules in the upper and lower lobes consistent with metastatic disease;   Hepatic metastasis also visualized. 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 Mediport was placed. Cycle # 1 of FOLFOX + Avastin was on 11/23/2015. CEA was 4555 on 11/23/2015. Cycle # 2 of FOLFOX + Avastin was on 12/08/2015. CEA was 3160 on 12/08/2015. Cycle # 3 of FOLFOX + Avastin was on 12/22/2015. CEA was 2516 on 12/21/2015. Cycle # 4 of FOLFOX + Avastin was on 01/05/2016. CEA was 2098 on 01/05/2015. Cycle # 5 of FOLFOX + Avastin was on 01/19/2016. CEA was 1511 on 01/05/2015.     -Bone scan on 01/26/2016 noted no metastatic disease. CT chest on 01/26/2016 revealed Significant response to treatment with no visible residual nodules. CT scan abdomen/pelvis on 01/26/2016 noted Interval decreased size of multiple masses in the liver compatible with treatment response. Continue another 2 months of FOLFOX + Avastin and repeat scans. Cycle # 6 of FOLFOX + Avastin was on 02/02/2016.  on 02/02/2016. Cycle # 7 of FOLFOX + Avastin was on 02/16/2016.  on 02/16/2016. Cycle # 8 of FOLFOX + Avastin was on 03/01/2016.  on 03/01/2016. Cycle # 9 of FOLFOX + Avastin was on 03/15/2016.  on 03/15/2016. Cycle # 10 of FOLFOX + Avastin was on 03/29/2016. .4 on 03/29/2016. Cycle # 11 of FOLFOX + Avastin was on 04/12/2016. .4 on 04/12/2016.     Re-staging scans on 04/19/2016: CT Chest: clear lungs; no evidence of recurring pulmonary nodule; CT Abdomen/Pelvis: Further interval decrease in size of the multiple metastatic hepatic lesions, the largest lesion now measures 3.9 x 3.5 cm and previously measured 4.5 cm in maximum diameter. No mesenteric lymphadenopathy is identified; Bone Scan: No evidence of osseous metastasis.   Continue same regimen and re-stage in 2-3 months. Cycle # 12 FOLFOX + Avastin was on 04/26/2016. .5 on 04/26/2016. Cycle # 13 FOLFOX + Avastin was on 05/10/2016. .3 on 05/10/2016. Cycle # 14 FOLFOX+AVASTIN was on 05/24/2016. .5 on 05/24/2016. Cycle # 15 FOLFOX + Avastin was on 06/07/2016. CEA 90.5 on 06/07/2016. Admitted to [facility] 06/13/2016-06/16/2016 for abdominal pain: EGD noted 1.5 cm clean based duodenal bulb ulceration s/p epinephrine and bicap per Dr. Blanca Castaneda. No active bleeding. A. Stomach, biopsy: Mild chronic gastritis, immunostain negative for Helicobacter  B. Esophagus, biopsy: Gastric glandular mucosa with prominent intestinal metaplasia ([place]'s epithelium), negative for epithelial dysplasia, esophageal squamous mucosa not identified  Cycle # 16 FOLFOX (discontinued avastin (bevacizumab) given association of peptic ulcer disease and known association of GI perforation) was on 06/21/2016. Cycle # 17 FOLFOX was on 07/05/2016. CEA 52.6 on 07/19/2016. Cycle # 17 FOLFOX was on 07/05/2016. CEA 52.6 on 07/05/2016. CEA 47.6 on 07/19/2016.     Re-staging scans 07/19/2106: CT Chest negative for metastatic disease. CT Abdomen/Pelvis: Stable hepatic lesions; Question new mural thickening in the cecum. Bone Scan: New Let hip lesion suspicious for bone metastasis.     Increased CEA; new mural thickening in the cecum and new left hip lesion suspicious for bone metastasis are consistent with disease progression; He derived maximum benefit from FOLFOX/AVASTIN. D/C FOLFOX/AVASTIN. We recommended FOLFIRI second line therapy. Cycle # 1 FOLFIRI was on 08/02/2016. CEA 40.8 on 08/02/2016. Xgeva q4 weeks started on 08/02/2016. Cycle # 2 FOLFIRI was on 08/16/2016. CEA 31.7 on 08/16/2016. Cycle # 3 FOLFIRI (added Avastin) was on 08/30/2016 given that ulcers healed on EGD 08/15/2016 by Dr. Nedra Preciado; Protonix bid since avastin re-started. Colonoscopy on 08/29/2016 (to look at the cecal area) unremarkable.  CEA 26.7 on 08/30/2016. Cycle # 4 FOLFIRI/Avastin was on 09/13/2016. CEA 23.4 on 09/13/2016. Cycle # 5 FOLFIRI/Avastin was on 09/27/2016. CEA 22.3 on 09/27/2016. Cycle # 6 FOLFIRI/Avastin was on 10/11/2016. CEA 18.4 on 10/11/2016.      Bone scan 10/21/2016 stable bone metastasis; ? New lesion anterior left sixth rib likely interval healing fracture. CT abdomen/pelvis revealed stable hepatic metastasis. CT chest negative for metastatic disease. Continue FOLFIRI/Avastin and repeat scans in 3 months. Cycle # 7 FOLFIRI/Avastin was on 10/25/2016. CEA 16.1  Cycle # 8 FOLFIRI/Avastin was on 11/08/2016. CEA 15.7  Cycle # 9 FOLFIRI/Avastin was on 11/29/2016. CEA 13.6 on 11/29/2016. Cycle # 10 FOLFIRI/Avastin was on 12/13/2016. CEA 10.6 on 12/13/2016. Cycle # 11 FOLFIRI/Avastin was on 12/27/2016. CEA 11.1 on 12/27/2016. Cycle # 12 FOLFIRI/Avastin was on 01/10/2017. CEA 9.7 on 01/10/2017.       CT chest 01/23/2017 No new pulmonary nodule or lymphadenopathy. Patchy groundglass opacities within the left lower lobe, suggestive of infectious or inflammatory etiology. Followup CT chest in 3 months. Slight increased linear sclerosis along the left anterior sixth rib corresponding to an area of increased uptake on the prior bone scan from 10/21/2016, favored to be related to interval healing changes of a nondisplaced fracture. CT abdomen/pelvis on 01/23/2017 Redemonstration of multiple hepatic metastases, which are similar compared to the prior CT from 10/21/2016. Redemonstration of area of increased sclerosis along the right acetabulum suspicious for metastatic disease. Bone scan on 01/23/2017 Stable subtle uptake within the left proximal diaphysis, again suggestive of metastatic disease  Decreased subtle uptake within the medial right acetabulum. Decreased uptake within the left anterior sixth rib corresponding to linear sclerosis on the recent CT, favored to be related to an joint rib fracture.     Continue FOLFIRI + Avastin Avastin was on 07/24/2018. Cycle # 52 FOLFIRI + Avastin was on 08/14/2018. Cycle # 53 FOLFIRI + Avastin was on 08/28/2018. CT chest 09/06/2018 noted interval decrease in size of LUCY measuring up to 4 mm, suggestive of treatment response. No mediastinal or hilar LN  CT abdomen/pelvis 09/06/2018 Slight interval increase in size of a previously noted metastatic lesion within the right hepatic lobe. This may be related to differences in phase of enhancement compared to the most recent study from 5/31/2018, the lesion remains decreased in size compared to the more previous studies. No abdominal, retroperitoneal, or pelvic lymphadenopathy. Continue FOLFIRI + Avastin and repeat scans in 2 months. Cycle # 54 FOLFIRI + Avastin was on 09/11/2018. Cycle # 55 FOLFIRI + Avastin was on 09/25/2018. Cycle # 56 FOLFIRI + Avastin was on 10/09/2018. Cycle # 57 FOLFIRI + Avastin was on 10/23/2018. CT chest 11/02/2018 stable 3 mm nodule within LUCY. No new right and left lung nodule. No mediastinal or osseous lesion. CT abdomen/pelvis 11/02/2018 stable metastatic disease to liver. No new metastatic disease identified. No mesenteric or retroperitoneal LN. No gross colonic lesion identified. Continue FOLFIRI + Avastin and repeat scans in 3 months. Cycle # 58 FOLFIRI + Avastin was on 11/06/2018. CEA 7.6 on 11/05/2018. Cycle # 59 FOLFIRI + Avastin was on 11/27/2018. CEA 6.9 on 11/26/2018. Cycle # 60 FOLFIRI + Avastin was on 12/11/2018. CEA 6.7 on 12/11/2018. Cycle # 61 FOLFIRI + Avastin was on 01/08/2019. CEA 5.6 on 01/07/2019. Cycle # 62 FOLFIRI + Avastin was on 01/29/2019. CEA 4.6 on 01/28/2019. Cycle # 63 FOLFIRI + Avastin was on 02/12/2019. CEA 4.3 on 02/11/2019. CT chest 02/21/2019 no evidence of metastatic disease. CT abdomen/pelvis 02/21/2019 stable hypodense liver lesions. No evidence of worsening metastatic disease. 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  Ordered breath. CARDIOVASCULAR: No chest pain, palpitations. GASTROINTESTINAL: No nausea/vomiting, abdominal pain. Intermittent diarrhea  GENITOURINARY: No dysuria, urinary frequency, hematuria. NEURO: No syncope, presyncope, headache. Remainder ROS: Negative. Past Medical History:   Past Medical History               Diagnosis Date    Arthritis      Cancer ([address]         colon    Depression      Hyperlipidemia      Hypertension           Medications:  Reviewed and reconciled.     Allergies: Allergies   Allergen Reactions    Neosporin [Neomycin-Polymyxin-Gramicidin] Rash    Tape Gissell Francesca Tape] Rash      Physical Exam:  /62 (Site: Right Upper Arm, Position: Sitting, Cuff Size: Medium Adult)   Pulse 69   Temp 98.1 °F (36.7 °C) (Temporal)   Ht 6' 1\" (1.854 m)   Wt 205 lb 12.8 oz (93.4 kg)   BMI 27.15 kg/m²   GENERAL: Alert, oriented x 3, not in acute distress  HEENT: PERRLA, EOMI. No oral lesions. NECK: Supple. Without lymphadenopathy. LUNGS: Lungs are CTA bilaterally, with no wheezing, crackles or rhonchi. CARDIOVASCULAR: Regular rhythm. No murmurs, rubs or gallops. ABDOMEN: Soft. Non-tender, non-distended. Positive bowel sounds. EXTREMITIES: Without clubbing, cyanosis, or edema. NEUROLOGIC: No focal deficits. 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 KRAS Mutation: Mutation detected. BRAF Mutation: Mutation not detected. 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 CT scan abdomen/pelvis on 10/26/2015 revealed small nodules at the lung bases, extensive liver lesions consistent with metastatic rectosigmoid cancer. CEA 3488 on 10/30/2015. Bone scan on 11/10/2015 noted no metastatic disease. CT chest on 11/10/2015 revealed Multiple pulmonary nodules in the upper and lower lobes consistent with metastatic disease; Hepatic metastasis also visualized.  For his advanced rectosigmoid cancer, systemic on 06/07/2016. CEA 90.5 on 06/07/2016. Admitted to [facility] 06/13/2016-06/16/2016 for abdominal pain: EGD noted 1.5 cm clean based duodenal bulb ulceration s/p epinephrine and bicap per Dr. Juana Cheng. No active bleeding. A. Stomach, biopsy: Mild chronic gastritis, immunostain negative for Helicobacter  B. Esophagus, biopsy: Gastric glandular mucosa with prominent ntestinal metaplasia ([place]'s epithelium), negative for epithelial dysplasia, esophageal squamous mucosa not identified     Cycle # 16 FOLFOX (discontinued avastin (bevacizumab) given association of peptic ulcer disease and known association of GI perforation.) was on 06/21/2016. CEA 47 on 06/21/2016. Cycle # 17 FOLFOX was on 07/05/2016. CEA 52.6 on 07/05/2016. CEA 47.6 on 07/19/2016.     Re-staging scans 07/19/2106: CT Chest negative for metastatic disease. CT Abdomen/Pelvis: Stable hepatic lesions; Question new mural thickening in the cecum. Bone Scan: New Let hip lesion suspicious for bone metastasis.     Increased CEA; new mural thickening in the cecum and new left hip lesion suspicious for bone metastasis are consistent with disease progression; He derived maximum benefit from FOLFOX/AVASTIN. D/C FOLFOX/AVASTIN. We recommended FOLFIRI second line therapy. Cycle # 1 FOLFIRI was on 08/02/2016. CEA 40.8 on 08/02/2016. Xgeva q4 weeks started on 08/02/2016. Cycle # 2 FOLFIRI was on 08/16/2016. CEA 31.7 on 08/16/2016. Cycle # 3 FOLFIRI (added Avastin) was on 08/30/2016 given that ulcers healed on EGD 08/15/2016 by Dr. Alma Stapleton; Protonix bid since avastin re-started. Colonoscopy on 08/29/2016 (to look at the cecal area) unremarkable. CEA 26.7 on 08/30/2016. Cycle # 4 FOLFIRI/Avastin was on 09/13/2016. CEA 23.4 on 09/13/2016. Cycle # 5 FOLFIRI/Avastin was on 09/27/2016. CEA 22.3 on 09/27/2016. Cycle # 6 FOLFIRI/Avastin was on 10/11/2016. CEA 18.4 on 10/11/2016.      Bone scan 10/21/2016 stable bone metastasis; ?  New lesion anterior left sixth rib 03/21/2017. Cycle # 18 FOLFIRI + Avastin was on 04/04/2017. CT chest 04/17/2017 negative for metastatic disease. CT abdomen/pelvis 04/17/2017 Stable hypodense metastatic lesions within the liver. Stable area of increased sclerosis along the right acetabulum  Bone scan 04/17/2017 Stable subtle uptake within the left proximal femoral diaphysis; No definite abnormal uptake in the region of a sclerotic lesion along the posterior column of the right acetabulum noted on CT. Stable slight uptake within the left anterior sixth rib corresponding to linear sclerosis on the recent CT, favored to be related to a fracture. Continue FOLFIRI + Avastin and repeat scans in 3 months. Cycle # 19 FOLFIRI + Avastin was on 04/18/2017. CEA 7.5 on 04/18/2017. Cycle # 20 FOLFIRI + Avastin was on 05/02/2017. CEA 7.7 on 05/02/2017. Cycle # 21 FOLFIRI + Avastin was on 05/16/2017. CEA 7.1 on 05/16/2017. Cycle # 22 FOLFIRI + Avastin was on 05/30/2017. CEA 8.2 on 05/30/2017. Cycle # 23 FOLFIRI + Avastin was on 06/13/2017. CEA 7.7 on 06/13/2017. Cycle # 24 FOLFIRI + Avastin was on 06/27/2017. CEA 6.6 on 06/27/2017. Re-staging scans 07/05/2017:  Bone scan stable proximal left femoral diaphysis, right acetabulum, and left anterior sixth rib lesions;  CT abdomen/pelvis stable hypodense metastatic lesions within the liver; CT chest without convincing evidence of metastatic disease. Cycle # 25 FOLFIRI + Avastin was on 07/11/2017. CEA 6.3 on 07/11/2017. Cycle # 26 FOLFIRI + Avastin was on 07/25/2017. CEA 7.3 on 07/25/2017. Cycle # 27 FOLFIRI + Avastin was on 08/08/2017. CEA 6.5 on 08/08/2017. Cycle # 28 FOLFIRI + Avastin was on 08/22/2017. CEA 5.9 on 08/22/2017. Cycle # 29 FOLFIRI + Avastin was on 09/05/2017. CEA 6.3 on 09/05/2017. Cycle # 30 FOLFIRI + Avastin was on 09/19/2017. CEA 6.3 on 09/19/2017. Bone scan 09/26/2017 stable. CT abdomen/pelvis on 09/26/2017 Stable hypodense mass in the liver.  Stable sclerotic

## 2020-01-07 NOTE — TELEPHONE ENCOUNTER
Chemotherapy pump orders faxed to [employer] and call placed to Nadiya [facility] to confirm receipt. The pump will be administered in the home and de accessed in 46 hrs. Encouraged to call with questions/concerns.

## 2020-01-09 ENCOUNTER — HOSPITAL ENCOUNTER (OUTPATIENT)
Dept: INFUSION THERAPY | Age: 70
Discharge: HOME | End: 2020-01-09
Payer: MEDICARE

## 2020-01-09 DIAGNOSIS — Z51.11 ENCOUNTER FOR ANTINEOPLASTIC CHEMOTHERAPY: Primary | ICD-10-CM

## 2020-01-09 DIAGNOSIS — C78.7 RECTAL CANCER METASTASIZED TO LIVER (HCC): ICD-10-CM

## 2020-01-09 DIAGNOSIS — C20 RECTAL CANCER METASTASIZED TO LIVER (HCC): ICD-10-CM

## 2020-01-09 PROCEDURE — 96372 THER/PROPH/DIAG INJ SC/IM: CPT

## 2020-01-09 PROCEDURE — 6360000002 HC RX W HCPCS: Performed by: INTERNAL MEDICINE

## 2020-01-09 RX ADMIN — PEGFILGRASTIM 6 MG: 6 INJECTION SUBCUTANEOUS at 14:36

## 2020-01-20 ENCOUNTER — HOSPITAL ENCOUNTER (OUTPATIENT)
Age: 70
Discharge: HOME | End: 2020-01-20
Payer: MEDICARE

## 2020-01-20 LAB
ALBUMIN SERPL-MCNC: 3.5 G/DL (ref 3.5–5.2)
ALP BLD-CCNC: 149 U/L (ref 40–129)
ALT SERPL-CCNC: 19 U/L (ref 0–40)
ANION GAP SERPL CALCULATED.3IONS-SCNC: 12 MMOL/L (ref 7–16)
ANISOCYTOSIS: ABNORMAL
AST SERPL-CCNC: 28 U/L (ref 0–39)
BASOPHILS ABSOLUTE: 0 E9/L (ref 0–0.2)
BASOPHILS RELATIVE PERCENT: 0.6 % (ref 0–2)
BILIRUB SERPL-MCNC: 0.7 MG/DL (ref 0–1.2)
BUN BLDV-MCNC: 14 MG/DL (ref 8–23)
CALCIUM SERPL-MCNC: 8.8 MG/DL (ref 8.6–10.2)
CEA: 3.7 NG/ML (ref 0–5.2)
CHLORIDE BLD-SCNC: 106 MMOL/L (ref 98–107)
CO2: 24 MMOL/L (ref 22–29)
CREAT SERPL-MCNC: 1 MG/DL (ref 0.7–1.2)
DOHLE BODIES: ABNORMAL
EOSINOPHILS ABSOLUTE: 0.14 E9/L (ref 0.05–0.5)
EOSINOPHILS RELATIVE PERCENT: 8.8 % (ref 0–6)
GFR AFRICAN AMERICAN: >60
GFR NON-AFRICAN AMERICAN: >60 ML/MIN/1.73
GLUCOSE BLD-MCNC: 130 MG/DL (ref 74–99)
HCT VFR BLD CALC: 25.5 % (ref 37–54)
HEMOGLOBIN: 7.9 G/DL (ref 12.5–16.5)
LYMPHOCYTES ABSOLUTE: 0.4 E9/L (ref 1.5–4)
LYMPHOCYTES RELATIVE PERCENT: 25.4 % (ref 20–42)
MCH RBC QN AUTO: 31.2 PG (ref 26–35)
MCHC RBC AUTO-ENTMCNC: 31 % (ref 32–34.5)
MCV RBC AUTO: 100.8 FL (ref 80–99.9)
MONOCYTES ABSOLUTE: 0.06 E9/L (ref 0.1–0.95)
MONOCYTES RELATIVE PERCENT: 3.5 % (ref 2–12)
NEUTROPHILS ABSOLUTE: 0.99 E9/L (ref 1.8–7.3)
NEUTROPHILS RELATIVE PERCENT: 62.3 % (ref 43–80)
OVALOCYTES: ABNORMAL
PDW BLD-RTO: 17 FL (ref 11.5–15)
PLATELET # BLD: 86 E9/L (ref 130–450)
PLATELET CONFIRMATION: NORMAL
PMV BLD AUTO: 10.7 FL (ref 7–12)
POIKILOCYTES: ABNORMAL
POLYCHROMASIA: ABNORMAL
POTASSIUM SERPL-SCNC: 3.8 MMOL/L (ref 3.5–5)
RBC # BLD: 2.53 E12/L (ref 3.8–5.8)
SODIUM BLD-SCNC: 142 MMOL/L (ref 132–146)
TARGET CELLS: ABNORMAL
TEAR DROP CELLS: ABNORMAL
TOTAL PROTEIN: 6.6 G/DL (ref 6.4–8.3)
TOXIC GRANULATION: ABNORMAL
WBC # BLD: 1.6 E9/L (ref 4.5–11.5)

## 2020-01-20 PROCEDURE — 36415 COLL VENOUS BLD VENIPUNCTURE: CPT

## 2020-01-20 PROCEDURE — 80053 COMPREHEN METABOLIC PANEL: CPT

## 2020-01-20 PROCEDURE — 82378 CARCINOEMBRYONIC ANTIGEN: CPT

## 2020-01-20 PROCEDURE — 85025 COMPLETE CBC W/AUTO DIFF WBC: CPT

## 2020-01-21 ENCOUNTER — HOSPITAL ENCOUNTER (OUTPATIENT)
Dept: INFUSION THERAPY | Age: 70
Discharge: HOME | End: 2020-01-21
Payer: MEDICARE

## 2020-01-21 ENCOUNTER — OFFICE VISIT (OUTPATIENT)
Dept: ONCOLOGY | Age: 70
End: 2020-01-21
Payer: MEDICARE

## 2020-01-21 VITALS
DIASTOLIC BLOOD PRESSURE: 66 MMHG | WEIGHT: 207.5 LBS | SYSTOLIC BLOOD PRESSURE: 124 MMHG | HEIGHT: 73 IN | BODY MASS INDEX: 27.5 KG/M2 | HEART RATE: 61 BPM | TEMPERATURE: 98.1 F

## 2020-01-21 VITALS — TEMPERATURE: 97.6 F | DIASTOLIC BLOOD PRESSURE: 71 MMHG | HEART RATE: 59 BPM | SYSTOLIC BLOOD PRESSURE: 135 MMHG

## 2020-01-21 DIAGNOSIS — C79.51 BONE METASTASIS (HCC): ICD-10-CM

## 2020-01-21 DIAGNOSIS — C20 RECTAL CANCER METASTASIZED TO LIVER (HCC): Primary | ICD-10-CM

## 2020-01-21 DIAGNOSIS — C78.7 RECTAL CANCER METASTASIZED TO LIVER (HCC): Primary | ICD-10-CM

## 2020-01-21 PROCEDURE — 96415 CHEMO IV INFUSION ADDL HR: CPT

## 2020-01-21 PROCEDURE — 96417 CHEMO IV INFUS EACH ADDL SEQ: CPT

## 2020-01-21 PROCEDURE — 6360000002 HC RX W HCPCS: Performed by: INTERNAL MEDICINE

## 2020-01-21 PROCEDURE — 99214 OFFICE O/P EST MOD 30 MIN: CPT | Performed by: INTERNAL MEDICINE

## 2020-01-21 PROCEDURE — 96372 THER/PROPH/DIAG INJ SC/IM: CPT

## 2020-01-21 PROCEDURE — 96413 CHEMO IV INFUSION 1 HR: CPT

## 2020-01-21 PROCEDURE — 2580000003 HC RX 258: Performed by: INTERNAL MEDICINE

## 2020-01-21 PROCEDURE — 96411 CHEMO IV PUSH ADDL DRUG: CPT

## 2020-01-21 PROCEDURE — 96368 THER/DIAG CONCURRENT INF: CPT

## 2020-01-21 PROCEDURE — 96375 TX/PRO/DX INJ NEW DRUG ADDON: CPT

## 2020-01-21 RX ORDER — ATROPINE SULFATE 0.4 MG/ML
0.4 AMPUL (ML) INJECTION ONCE
Status: CANCELLED | OUTPATIENT
Start: 2020-01-21

## 2020-01-21 RX ORDER — DEXAMETHASONE SODIUM PHOSPHATE 10 MG/ML
10 INJECTION INTRAMUSCULAR; INTRAVENOUS ONCE
Status: COMPLETED | OUTPATIENT
Start: 2020-01-21 | End: 2020-01-21

## 2020-01-21 RX ORDER — 0.9 % SODIUM CHLORIDE 0.9 %
10 VIAL (ML) INJECTION ONCE
Status: CANCELLED | OUTPATIENT
Start: 2020-01-21

## 2020-01-21 RX ORDER — DEXAMETHASONE SODIUM PHOSPHATE 10 MG/ML
10 INJECTION, SOLUTION INTRAMUSCULAR; INTRAVENOUS ONCE
Status: CANCELLED | OUTPATIENT
Start: 2020-01-21

## 2020-01-21 RX ORDER — HEPARIN SODIUM (PORCINE) LOCK FLUSH IV SOLN 100 UNIT/ML 100 UNIT/ML
500 SOLUTION INTRAVENOUS PRN
Status: CANCELLED | OUTPATIENT
Start: 2020-01-21

## 2020-01-21 RX ORDER — PALONOSETRON HYDROCHLORIDE 0.05 MG/ML
0.25 INJECTION, SOLUTION INTRAVENOUS ONCE
Status: COMPLETED | OUTPATIENT
Start: 2020-01-21 | End: 2020-01-21

## 2020-01-21 RX ORDER — HEPARIN SODIUM (PORCINE) LOCK FLUSH IV SOLN 100 UNIT/ML 100 UNIT/ML
500 SOLUTION INTRAVENOUS PRN
Status: DISCONTINUED | OUTPATIENT
Start: 2020-01-21 | End: 2020-01-22 | Stop reason: HOSPADM

## 2020-01-21 RX ORDER — PALONOSETRON HYDROCHLORIDE 0.05 MG/ML
0.25 INJECTION, SOLUTION INTRAVENOUS ONCE
Status: CANCELLED | OUTPATIENT
Start: 2020-01-21

## 2020-01-21 RX ORDER — SODIUM CHLORIDE 0.9 % (FLUSH) 0.9 %
10 SYRINGE (ML) INJECTION PRN
Status: DISCONTINUED | OUTPATIENT
Start: 2020-01-21 | End: 2020-01-22 | Stop reason: HOSPADM

## 2020-01-21 RX ORDER — ATROPINE SULFATE 0.4 MG/ML
0.4 AMPUL (ML) INJECTION ONCE
Status: COMPLETED | OUTPATIENT
Start: 2020-01-21 | End: 2020-01-21

## 2020-01-21 RX ORDER — SODIUM CHLORIDE 9 MG/ML
250 INJECTION, SOLUTION INTRAVENOUS CONTINUOUS
Status: CANCELLED | OUTPATIENT
Start: 2020-01-21

## 2020-01-21 RX ORDER — FLUOROURACIL 50 MG/ML
850 INJECTION, SOLUTION INTRAVENOUS ONCE
Status: CANCELLED | OUTPATIENT
Start: 2020-01-21

## 2020-01-21 RX ORDER — SODIUM CHLORIDE 9 MG/ML
250 INJECTION, SOLUTION INTRAVENOUS CONTINUOUS
Status: DISCONTINUED | OUTPATIENT
Start: 2020-01-21 | End: 2020-01-22 | Stop reason: HOSPADM

## 2020-01-21 RX ORDER — SODIUM CHLORIDE 0.9 % (FLUSH) 0.9 %
10 SYRINGE (ML) INJECTION PRN
Status: CANCELLED | OUTPATIENT
Start: 2020-01-21

## 2020-01-21 RX ORDER — FLUOROURACIL 50 MG/ML
850 INJECTION, SOLUTION INTRAVENOUS ONCE
Status: COMPLETED | OUTPATIENT
Start: 2020-01-21 | End: 2020-01-21

## 2020-01-21 RX ORDER — SODIUM CHLORIDE 9 MG/ML
INJECTION, SOLUTION INTRAVENOUS CONTINUOUS
Status: CANCELLED | OUTPATIENT
Start: 2020-01-21

## 2020-01-21 RX ORDER — DIPHENHYDRAMINE HYDROCHLORIDE 50 MG/ML
50 INJECTION INTRAMUSCULAR; INTRAVENOUS ONCE
Status: CANCELLED | OUTPATIENT
Start: 2020-01-21

## 2020-01-21 RX ORDER — METHYLPREDNISOLONE SODIUM SUCCINATE 125 MG/2ML
125 INJECTION, POWDER, LYOPHILIZED, FOR SOLUTION INTRAMUSCULAR; INTRAVENOUS ONCE
Status: CANCELLED | OUTPATIENT
Start: 2020-01-21

## 2020-01-21 RX ORDER — EPINEPHRINE 1 MG/ML
0.3 INJECTION, SOLUTION, CONCENTRATE INTRAVENOUS PRN
Status: CANCELLED | OUTPATIENT
Start: 2020-01-21

## 2020-01-21 RX ADMIN — FLUOROURACIL 850 MG: 50 INJECTION, SOLUTION INTRAVENOUS at 11:30

## 2020-01-21 RX ADMIN — ATROPINE SULFATE 0.4 MG: 0.4 INJECTION, SOLUTION INTRAMUSCULAR; INTRAVENOUS; SUBCUTANEOUS at 09:43

## 2020-01-21 RX ADMIN — SODIUM CHLORIDE 250 ML: 9 INJECTION, SOLUTION INTRAVENOUS at 08:43

## 2020-01-21 RX ADMIN — BEVACIZUMAB 400 MG: 400 INJECTION, SOLUTION INTRAVENOUS at 09:10

## 2020-01-21 RX ADMIN — LEUCOVORIN CALCIUM 850 MG: 10 INJECTION INTRAMUSCULAR; INTRAVENOUS at 09:48

## 2020-01-21 RX ADMIN — PALONOSETRON 0.25 MG: 0.25 INJECTION, SOLUTION INTRAVENOUS at 08:51

## 2020-01-21 RX ADMIN — SODIUM CHLORIDE, PRESERVATIVE FREE 10 ML: 5 INJECTION INTRAVENOUS at 11:35

## 2020-01-21 RX ADMIN — DENOSUMAB 120 MG: 120 INJECTION SUBCUTANEOUS at 08:53

## 2020-01-21 RX ADMIN — SODIUM CHLORIDE 400 MG: 9 INJECTION, SOLUTION INTRAVENOUS at 09:48

## 2020-01-21 RX ADMIN — DEXAMETHASONE SODIUM PHOSPHATE 10 MG: 10 INJECTION INTRAMUSCULAR; INTRAVENOUS at 08:52

## 2020-01-21 RX ADMIN — Medication 500 UNITS: at 11:35

## 2020-01-21 NOTE — PROGRESS NOTES
Chemotherapy pump orders faxed to [phone] and call placed to Lawrence Ruano @ [phone] to confirm receipt. The pump will be administered in the home and de accessed in 46 hrs. Encouraged to call with questions/concerns.

## 2020-01-21 NOTE — PROGRESS NOTES
[place] [id number]  Attending Clinic Note     Reason for Visit: Follow-up on a patient with Metastatic Rectal Cancer.     PCP: Dulce Rincon MD     History of Present Illness:  79 y/o  male who was referred to see Dr. Sherley Lauren (GI team) for evaluation of bright red blood per rectum, mild anemia and change in bowel habits with diarrhea. CEA 2640 on 10/19/2015. AlcP 299 AST 57 ALT 75 on 10/19/2015. Colonoscopy in 2013 noted no significant polyps, colitis or lesions at that time. Denies any Family History of colorectal cancer or polyps.     Colonoscopy on 10/19/2015 revealed:  1. Ascending polyp, 8 mm, hot snare: Tubulovillous adenoma. 2. Transverse polyp, 1 cm, hot snare: Serrated polyp most consistent with sessile serrated adenoma. 3. Five splenic flexure polyps, three - 5 mm, 7 mm, 8 mm, hot snare and biopsy: Four segments of Tubular Adenoma  4. Descending polyp, 5 mm, biopsy: Tubular adenoma. 5. Sigmoid polyp, 4 mm biopsy, Serrated polyp most consistent with sessile serrated adenoma. 6. Two rectal polyps, 4 mm biopsy: Serrated polyp most consistent with hyperplastic polyp. 7. Rectosigmoid colon mass (large mass approximately 65% circumference of the lumen; Very friable, firm and hard): Tubulovillous adenoma with associated focal erosion and fibroplasia.      CT scan abdomen/pelvis on 10/26/2015:  1. Small nodules at lung bases likely represent metastatic colon   cancer. 2. Extensive likely metastatic colon cancer throughout the liver.      3. Mild mural thickening and luminal narrowing in the   terminal ileum, otherwise nonspecific.      Colonoscopy with snare removal rectal mass was performed by Dr. Alton Schneider. Pathology proved:  Rectal polyp: Invasive adenocarcinoma involving villous adenoma and extending to the cauterized edge of excision. KRAS Mutation: Mutation detected. BRAF Mutation: Mutation not detected.   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     CEA 3488. Bone scan on 11/10/2015 noted no metastatic disease. CT chest on 11/10/2015 revealed Multiple pulmonary nodules in the upper and lower lobes consistent with metastatic disease;   Hepatic metastasis also visualized. 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 Mediport was placed. Cycle # 1 of FOLFOX + Avastin was on 11/23/2015. CEA was 4555 on 11/23/2015. Cycle # 2 of FOLFOX + Avastin was on 12/08/2015. CEA was 3160 on 12/08/2015. Cycle # 3 of FOLFOX + Avastin was on 12/22/2015. CEA was 2516 on 12/21/2015. Cycle # 4 of FOLFOX + Avastin was on 01/05/2016. CEA was 2098 on 01/05/2015. Cycle # 5 of FOLFOX + Avastin was on 01/19/2016. CEA was 1511 on 01/05/2015.     -Bone scan on 01/26/2016 noted no metastatic disease. CT chest on 01/26/2016 revealed Significant response to treatment with no visible residual nodules. CT scan abdomen/pelvis on 01/26/2016 noted Interval decreased size of multiple masses in the liver compatible with treatment response. Continue another 2 months of FOLFOX + Avastin and repeat scans. Cycle # 6 of FOLFOX + Avastin was on 02/02/2016.  on 02/02/2016. Cycle # 7 of FOLFOX + Avastin was on 02/16/2016.  on 02/16/2016. Cycle # 8 of FOLFOX + Avastin was on 03/01/2016.  on 03/01/2016. Cycle # 9 of FOLFOX + Avastin was on 03/15/2016.  on 03/15/2016. Cycle # 10 of FOLFOX + Avastin was on 03/29/2016. .4 on 03/29/2016. Cycle # 11 of FOLFOX + Avastin was on 04/12/2016. .4 on 04/12/2016.     Re-staging scans on 04/19/2016: CT Chest: clear lungs; no evidence of recurring pulmonary nodule; CT Abdomen/Pelvis: Further interval decrease in size of the multiple metastatic hepatic lesions, the largest lesion now measures 3.9 x 3.5 cm and previously measured 4.5 cm in maximum diameter. No mesenteric lymphadenopathy is identified; Bone Scan: No evidence of osseous metastasis.   Continue same regimen and on 08/30/2016. Cycle # 4 FOLFIRI/Avastin was on 09/13/2016. CEA 23.4 on 09/13/2016. Cycle # 5 FOLFIRI/Avastin was on 09/27/2016. CEA 22.3 on 09/27/2016. Cycle # 6 FOLFIRI/Avastin was on 10/11/2016. CEA 18.4 on 10/11/2016.      Bone scan 10/21/2016 stable bone metastasis; ? New lesion anterior left sixth rib likely interval healing fracture. CT abdomen/pelvis revealed stable hepatic metastasis. CT chest negative for metastatic disease. Continue FOLFIRI/Avastin and repeat scans in 3 months. Cycle # 7 FOLFIRI/Avastin was on 10/25/2016. CEA 16.1  Cycle # 8 FOLFIRI/Avastin was on 11/08/2016. CEA 15.7  Cycle # 9 FOLFIRI/Avastin was on 11/29/2016. CEA 13.6 on 11/29/2016. Cycle # 10 FOLFIRI/Avastin was on 12/13/2016. CEA 10.6 on 12/13/2016. Cycle # 11 FOLFIRI/Avastin was on 12/27/2016. CEA 11.1 on 12/27/2016. Cycle # 12 FOLFIRI/Avastin was on 01/10/2017. CEA 9.7 on 01/10/2017.       CT chest 01/23/2017 No new pulmonary nodule or lymphadenopathy. Patchy groundglass opacities within the left lower lobe, suggestive of infectious or inflammatory etiology. Followup CT chest in 3 months. Slight increased linear sclerosis along the left anterior sixth rib corresponding to an area of increased uptake on the prior bone scan from 10/21/2016, favored to be related to interval healing changes of a nondisplaced fracture. CT abdomen/pelvis on 01/23/2017 Redemonstration of multiple hepatic metastases, which are similar compared to the prior CT from 10/21/2016. Redemonstration of area of increased sclerosis along the right acetabulum suspicious for metastatic disease. Bone scan on 01/23/2017 Stable subtle uptake within the left proximal diaphysis, again suggestive of metastatic disease  Decreased subtle uptake within the medial right acetabulum. Decreased uptake within the left anterior sixth rib corresponding to linear sclerosis on the recent CT, favored to be related to an joint rib fracture.     Continue FOLFIRI + Avastin and repeat scans in 3 months. Cycle # 13 FOLFIRI + Avastin was on 01/24/2017. Cycle # 14 FOLFIRI + Avastin was on 02/07/2017. Cycle # 15 FOLFIRI + Avastin was on 02/21/2017. Cycle # 16 FOLFIRI + Avastin was on 03/07/2017. Cycle # 17 FOLFIRI + Avastin was on 03/21/2017. Cycle # 18 FOLFIRI + Avastin was on 04/04/2017. CT chest 04/17/2017 negative for metastatic disease. CT abdomen/pelvis 04/17/2017 Stable hypodense metastatic lesions within the liver. Stable area of increased sclerosis along the right acetabulum  Bone scan 04/17/2017 Stable subtle uptake within the left proximal femoral diaphysis; No definite abnormal uptake in the region of a sclerotic lesion along the posterior column of the right acetabulum noted on CT. Stable slight uptake within the left anterior sixth rib corresponding to linear sclerosis on the recent CT, favored to be related to a fracture. Continue FOLFIRI + Avastin and repeat scans in 3 months. Cycle # 19 FOLFIRI + Avastin was on 04/18/2017. Cycle # 20 FOLFIRI + Avastin was on 05/02/2017. Cycle # 21 FOLFIRI + Avastin was on 05/16/2017. Cycle # 22 FOLFIRI + Avastin was on 05/30/2017. Cycle # 23 FOLFIRI + Avastin was on 06/13/2017. Cycle # 24 FOLFIRI + Avastin was on 06/27/2017. Cycle # 25 FOLFIRI + Avastin was on 07/11/2017. Cycle # 26 FOLFIRI + Avastin was on 07/25/2017. Cycle # 27 FOLFIRI + Avastin was on 08/08/2017. Cycle # 28 FOLFIRI + Avastin was on 08/22/2017. Cycle # 29 FOLFIRI + Avastin was on 09/05/2017. Cycle # 30 FOLFIRI + Avastin was on 09/19/2017. Bone scan 09/26/2017 stable. CT abdomen/pelvis on 09/26/2017 Stable hypodense mass in the liver. Stable sclerotic lesion in the acetabulum. CT chest 09/26/2017 negative for metastatic disease. Cycle # 31 FOLFIRI + Avastin was on 10/03/2017. CEA 7.4 on 10/03/2017. Cycle # 32 FOLFIRI + Avastin was on 10/17/2017. CEA 6.0 on 10/17/2017. Cycle # 33 FOLFIRI + Avastin was on 10/31/2017.  CEA 6.8 on 10/31/2017. Cycle # 34 FOLFIRI + Avastin was on 11/14/2017. CEA 7.2 on 11/14/2017. Cycle # 35 FOLFIRI + Avastin was on 11/28/2017. CEA 5.1 on 11/28/2017. Cycle # 36 FOLFIRI + Avastin was on 12/12/2017. CEA 6.1 on 12/12/2017. Bone scan 12/22/2017 noted no evidence of metastatic disease to the axial or appendicular skeleton. CT chest 12/22/2017 noted no evidence of metastatic disease. CT abdomen/pelvis 12/22/2017 noted stable hypodense lesions in the liver. Continue FOLFIRI + Avastin and repeat scans in 3 months. Cycle # 37 FOLFIRI + Avastin was on 12/27/2018. Cycle # 38 FOLFIRI + Avastin was on 01/09/2018. Cycle # 39 FOLFIRI + Avastin was on 01/23/2018. Cycle # 40 FOLFIRI + Avastin was on 02/06/2018. Cycle # 41 FOLFIRI + Avastin was on 02/20/2018. Cycle # 42 FOLFIRI + Avastin was on 03/06/2018. Cycle # 43 FOLFIRI + Avastin was on 03/20/2018. Bone scan on 03/26/2018 negative for metastatic disease. CT chest 03/26/2018 noted ? new 7 mm nodule in LUCY. CT abdomen/pelvis 03/26/2018 noted stable hypodense lesions in the liver. ? New small ascites in the abdomen. Patient wants to continue to monitor the lung finding and repeat scans in 2 months instead of changing the current regimen into oral Lonsurf. Cycle # 44 FOLFIRI + Avastin was on 04/03/2018. CEA 6.3 on 04/03/2018. Cycle # 45 FOLFIRI + Avastin was on 04/24/2018. CEA 5.3 on 04/24/2018. Cycle # 46 FOLFIRI + Avastin was on 05/08/2018. CEA 5.4 on 05/08/2018. Cycle # 47 FOLFIRI + Avastin was on 05/22/2018. CEA 6.1 on 05/22/2018. CT chest 05/31/2018 noted stable nodule in LUCY. No evidence of worsening malignancy. CT abdomen/pelvis on 05/31/2018 noted stable liver metastasis. No evidence of worsening malignancy. Continue FOLFIRI + Avastin and repeat scans in 3 months. Cycle # 48 FOLFIRI + Avastin was on 06/06/2018. Cycle # 49 FOLFIRI + Avastin was on 06/19/2018. Cycle # 50 FOLFIRI + Avastin was on 07/03/2018.   Cycle # 51 FOLFIRI + MRI thoracic spine and referred to neurosurgery team.    Cycle # 64 FOLFIRI + Avastin was on 02/26/2019. CEA 4.7 on 02/25/2019. Cycle # 65 FOLFIRI + Avastin was on 03/12/2019. CEA 4.0 on 03/11/2019. Cycle # 66 FOLFIRI + Avastin was on 03/26/2019. CEA 4.7 on 03/25/2019. Cycle # 67 FOLFIRI + Avastin was on 04/09/2019. CEA 5.6 on 04/08/2019. Cycle # 68 FOLFIRI + Avastin was on 04/30/2019. CEA 4.9 on 04/29/2019. Cycle # 69 FOLFIRI + Avastin was on 05/14/2019. CEA 5.3 on 05/14/2019. CT chest 05/23/2019 stable small lung nodule with no evidence of metastatic disease. CT abdomen/pelvis 05/23/2019 Decrease conspicuity of the liver lesions. No new lesions seen. Stable splenomegaly. Continue FOLFIRI + Avastin and repeat scans in 3 months. Cycle # 70 FOLFIRI + Avastin was on 06/04/2019. CEA 4.8 on 06/03/2019. Cycle # 71 FOLFIRI + Avastin was on 06/18/2019. CEA 4.6 on 06/17/2019. Cycle # 72 FOLFIRI + Avastin was on 07/09/2019. CEA 4.3 on 07/08/2019. Cycle # 73 FOLFIRI + Avastin was on 07/30/2019. CEA 5.0 on 07/29/2019. Cycle # 74 FOLFIRI + Avastin was on 08/13/2019. CEA 5.0 on 08/12/2019. CT chest 08/20/2019 negative  CT abdomen/pelvis 08/20/2019 Previous identified hepatic lesions are not visualized on the current exam.  Continue FOLFIRI + Avastin and repeat scans in 3 months. Cycle # 75 FOLFIRI + Avastin was on 08/27/2019. CEA 5.0 on 08/26/2019. Cycle # 76 FOLFIRI + Avastin was on 09/17/2019. CEA 5.5 on 09/16/2019. Cycle # 77 FOLFIRI + Avastin was on 10/15/2019. CEA 4.6 on 10/15/2019. Cycle # 78 FOLFIRI + Avastin was on 10/29/2019. CEA 4.1 on 10/28/2019. Cycle # 79 FOLFIRI + Avastin was on 11/12/2019. CEA 4.3 on 11/12/2019. Cycle # 80 FOLFIRI + Avastin was on 12/10/2019. CEA 4.0 on 12/09/2019. CT chest 12/19/2019 Stable 3 mm nodule within the left upper lobe, similar to the previous studies dating back to 11/2/2018, however decreased in size compared to the CT from 3/26/2018. No thoracic LN.    CT EXTREMITIES: Without clubbing, cyanosis, or edema. NEUROLOGIC: No focal deficits. 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 KRAS Mutation: Mutation detected. BRAF Mutation: Mutation not detected. 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 CT scan abdomen/pelvis on 10/26/2015 revealed small nodules at the lung bases, extensive liver lesions consistent with metastatic rectosigmoid cancer. CEA 3488 on 10/30/2015. Bone scan on 11/10/2015 noted no metastatic disease. CT chest on 11/10/2015 revealed Multiple pulmonary nodules in the upper and lower lobes consistent with metastatic disease; Hepatic metastasis also visualized. 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 Mediport was placed. Cycle # 1 of FOLFOX + Avastin was on 11/23/2015. CEA was 4555 on 11/23/2015. Cycle # 2 of FOLFOX + Avastin was on 12/08/2015. CEA was 3160 on 12/08/2015. Cycle # 3 of FOLFOX + Avastin was on 12/22/2015. CEA was 2516 on 12/21/2015. Cycle # 4 of FOLFOX + Avastin was on 01/05/2016. CEA was 2098 on 01/05/2015. Cycle # 5 of FOLFOX + Avastin was on 01/19/2016. CEA was 1511 on 01/05/2015.     -Bone scan on 01/26/2016 noted no metastatic disease. CT chest on 01/26/2016 revealed Significant response to treatment with no visible residual nodules. CT scan abdomen/pelvis on 01/26/2016 noted Interval decreased size of multiple masses in the liver compatible with treatment response. Continue another 2 months of FOLFOX + Avastin and repeat scans. Cycle # 6 of FOLFOX + Avastin was on 02/02/2016.  on 02/02/2016. Cycle # 7 of FOLFOX + Avastin was on 02/16/2016.  on 02/16/2016. Cycle # 8 of FOLFOX + Avastin was on 03/01/2016.  on 03/01/2016. Cycle # 9 of FOLFOX + Avastin was on 03/15/2016.  on 03/15/2016. Cycle # 10 of FOLFOX + Avastin was on 03/29/2016.  .4 on 03/29/2016. Cycle # 11 of FOLFOX + Avastin was on 04/12/2016. .4 on 04/12/2016.     Re-staging scans on 04/19/2016: CT Chest: clear lungs; no evidence of recurring pulmonary nodule; CT Abdomen/Pelvis: Further interval decrease in size of the multiple metastatic hepatic lesions, the largest lesion now measures 3.9 x 3.5 cm and previously measured 4.5 cm in maximum diameter. No mesenteric lymphadenopathy is identified; Bone Scan: No evidence of osseous metastasis. Continue same regimen and re-stage in 2-3 months. Cycle # 12 FOLFOX + Avastin was on 04/26/2016. .5 on 04/26/2016. Cycle # 13 FOLFOX + Avastin was on 05/10/2016. .3 on 05/10/2016. Cycle # 14 FOLFOX+AVASTIN was on 05/24/2016. .5 on 05/24/2016. Cycle # 15 FOLFOX + Avastin was on 06/07/2016. CEA 90.5 on 06/07/2016. Admitted to [facility] 06/13/2016-06/16/2016 for abdominal pain: EGD noted 1.5 cm clean based duodenal bulb ulceration s/p epinephrine and bicap per Dr. Brandan Ramirez. No active bleeding. A. Stomach, biopsy: Mild chronic gastritis, immunostain negative for Helicobacter  B. Esophagus, biopsy: Gastric glandular mucosa with prominent ntestinal metaplasia ([place]'s epithelium), negative for epithelial dysplasia, esophageal squamous mucosa not identified     Cycle # 16 FOLFOX (discontinued avastin (bevacizumab) given association of peptic ulcer disease and known association of GI perforation.) was on 06/21/2016. CEA 47 on 06/21/2016. Cycle # 17 FOLFOX was on 07/05/2016. CEA 52.6 on 07/05/2016. CEA 47.6 on 07/19/2016.     Re-staging scans 07/19/2106: CT Chest negative for metastatic disease. CT Abdomen/Pelvis: Stable hepatic lesions; Question new mural thickening in the cecum. Bone Scan: New Let hip lesion suspicious for bone metastasis.     Increased CEA; new mural thickening in the cecum and new left hip lesion suspicious for bone metastasis are consistent with disease progression;  He derived maximum benefit from compared to the prior CT from 10/21/2016. Redemonstration of area of increased sclerosis along the right acetabulum suspicious for metastatic disease. Bone scan on 01/23/2017 Stable subtle uptake within the left proximal diaphysis, again suggestive of metastatic disease  Decreased subtle uptake within the medial right acetabulum. Decreased uptake within the left anterior sixth rib corresponding to linear sclerosis on the recent CT, favored to be related to an joint rib fracture. Continue FOLFIRI + Avastin and repeat scans in 3 months. Cycle # 13 FOLFIRI + Avastin was on 01/24/2017. Cycle # 14 FOLFIRI + Avastin was on 02/07/2017. Cycle # 15 FOLFIRI + Avastin was on 02/21/2017. Cycle # 16 FOLFIRI + Avastin was on 03/07/2017. Cycle # 17 FOLFIRI + Avastin was on 03/21/2017. Cycle # 18 FOLFIRI + Avastin was on 04/04/2017. CT chest 04/17/2017 negative for metastatic disease. CT abdomen/pelvis 04/17/2017 Stable hypodense metastatic lesions within the liver. Stable area of increased sclerosis along the right acetabulum  Bone scan 04/17/2017 Stable subtle uptake within the left proximal femoral diaphysis; No definite abnormal uptake in the region of a sclerotic lesion along the posterior column of the right acetabulum noted on CT. Stable slight uptake within the left anterior sixth rib corresponding to linear sclerosis on the recent CT, favored to be related to a fracture. Continue FOLFIRI + Avastin and repeat scans in 3 months. Cycle # 19 FOLFIRI + Avastin was on 04/18/2017. CEA 7.5 on 04/18/2017. Cycle # 20 FOLFIRI + Avastin was on 05/02/2017. CEA 7.7 on 05/02/2017. Cycle # 21 FOLFIRI + Avastin was on 05/16/2017. CEA 7.1 on 05/16/2017. Cycle # 22 FOLFIRI + Avastin was on 05/30/2017. CEA 8.2 on 05/30/2017. Cycle # 23 FOLFIRI + Avastin was on 06/13/2017. CEA 7.7 on 06/13/2017. Cycle # 24 FOLFIRI + Avastin was on 06/27/2017. CEA 6.6 on 06/27/2017.    Re-staging scans 07/05/2017:  Bone scan stable proximal left femoral diaphysis, right acetabulum, and left anterior sixth rib lesions;  CT abdomen/pelvis stable hypodense metastatic lesions within the liver; CT chest without convincing evidence of metastatic disease. Cycle # 25 FOLFIRI + Avastin was on 07/11/2017. CEA 6.3 on 07/11/2017. Cycle # 26 FOLFIRI + Avastin was on 07/25/2017. CEA 7.3 on 07/25/2017. Cycle # 27 FOLFIRI + Avastin was on 08/08/2017. CEA 6.5 on 08/08/2017. Cycle # 28 FOLFIRI + Avastin was on 08/22/2017. CEA 5.9 on 08/22/2017. Cycle # 29 FOLFIRI + Avastin was on 09/05/2017. CEA 6.3 on 09/05/2017. Cycle # 30 FOLFIRI + Avastin was on 09/19/2017. CEA 6.3 on 09/19/2017. Bone scan 09/26/2017 stable. CT abdomen/pelvis on 09/26/2017 Stable hypodense mass in the liver. Stable sclerotic lesion in the acetabulum. CT chest 09/26/2017 negative for metastatic disease. Cycle # 31 FOLFIRI + Avastin was on 10/03/2017. CEA 7.4 on 10/03/2017. Cycle # 32 FOLFIRI + Avastin was on 10/17/2017. CEA 6.0 on 10/17/2017. Cycle # 33 FOLFIRI + Avastin was on 10/31/2017. CEA 6.8 on 10/31/2017. Cycle # 34 FOLFIRI + Avastin was on 11/14/2017. CEA 7.2 on 11/14/2017. Cycle # 35 FOLFIRI + Avastin was on 11/28/2017. CEA 5.1 on 11/28/2017. Cycle # 36 FOLFIRI + Avastin was on 12/12/2017. CEA 6.1 on 12/12/2017. Bone scan 12/22/2017 noted no evidence of metastatic disease to the axial or appendicular skeleton. CT chest 12/22/2017 noted no evidence of metastatic disease. CT abdomen/pelvis 12/22/2017 noted stable hypodense lesions in the liver. Continue FOLFIRI + Avastin and repeat scans in 3 months. Cycle # 37 FOLFIRI + Avastin was on 12/27/2018. CEA 6.7 on 12/27/2017. Cycle # 38 FOLFIRI + Avastin was on 01/09/2018. CEA 5.0 on 01/09/2018. Cycle # 39 FOLFIRI + Avastin was on 01/23/2018. CEA 6.5 on 01/23/2018. Cycle # 40 FOLFIRI + Avastin was on 02/06/2018. CEA 6.9 on 02/06/2018. Cycle # 41 FOLFIRI + Avastin was on 02/20/2018.  CEA 6.4 on more previous studies. No abdominal, retroperitoneal, or pelvic lymphadenopathy. Continue FOLFIRI + Avastin and repeat scans in 2 months. Cycle # 54 FOLFIRI + Avastin was on 09/11/2018. CEA 6.4 on 09/10/2018. Cycle # 55 FOLFIRI + Avastin was on 09/25/2018. CEA 6.4 on 09/25/2018. Cycle # 56 FOLFIRI + Avastin was on 10/09/2018. CEA 6.7 on 10/08/2018. Cycle # 57 FOLFIRI + Avastin was on 10/23/2018. CEA 7.2 on 10/22/2018. CT chest 11/02/2018 stable 3 mm nodule within LUCY. No new right and left lung nodule. No mediastinal or osseous lesion. CT abdomen/pelvis 11/02/2018 stable metastatic disease to liver. No new metastatic disease identified. No mesenteric or retroperitoneal LN. No gross colonic lesion identified. Continue FOLFIRI + Avastin and repeat scans in 3 months. Cycle # 58 FOLFIRI + Avastin was on 11/06/2018. CEA 7.6 on 11/05/2018. Cycle # 59 FOLFIRI + Avastin was on 11/27/2018. CEA 6.9 on 11/26/2018. Cycle # 60 FOLFIRI + Avastin was on 12/11/2018. CEA 6.7 on 12/11/2018. Cycle # 61 FOLFIRI + Avastin was on 01/08/2019. CEA 5.6 on 01/07/2019. Cycle # 62 FOLFIRI + Avastin was on 01/29/2019. CEA 4.6 on 01/28/2019. Cycle # 63 FOLFIRI + Avastin was on 02/12/2019. CEA 4.3 on 02/11/2019. CT chest 02/21/2019 no evidence of metastatic disease. CT abdomen/pelvis 02/21/2019 stable hypodense liver lesions. No evidence of worsening metastatic disease. 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 Ordered MRI thoracic spine and referred to neurosurgery team.  Cycle # 64 FOLFIRI + Avastin was on 02/26/2019. CEA 4.7 on 02/25/2019. Cycle # 65 FOLFIRI + Avastin was on 03/12/2019. CEA 4.0 on 03/11/2019. Cycle # 66 FOLFIRI + Avastin was on 03/26/2019. CEA 4.7 on 03/25/2019. Cycle # 67 FOLFIRI + Avastin was on 04/09/2019. CEA 5.6 on 04/08/2019. Cycle # 68 FOLFIRI + Avastin was on 04/30/2019. CEA 4.9 on 04/29/2019. Cycle # 69 FOLFIRI + Avastin was on 05/14/2019.  CEA 5.3 on

## 2020-01-21 NOTE — PROGRESS NOTES
Patient presents to clinic today for Xgeva injection. Patient's labs monitored, specifically, Calcium level, to ensure no increased risk of hypocalcemia with administration of the medication. Patient's calcium level is 8.8 mg/dL. Patient received therapy and will continue to be monitored prior to each dose.     Kristin Mckee, [facility] 1/21/2020 8:52 AM

## 2020-01-23 ENCOUNTER — HOSPITAL ENCOUNTER (OUTPATIENT)
Dept: INFUSION THERAPY | Age: 70
Discharge: HOME | End: 2020-01-23
Payer: MEDICARE

## 2020-01-23 DIAGNOSIS — C78.7 RECTAL CANCER METASTASIZED TO LIVER (HCC): ICD-10-CM

## 2020-01-23 DIAGNOSIS — C20 RECTAL CANCER METASTASIZED TO LIVER (HCC): ICD-10-CM

## 2020-01-23 DIAGNOSIS — Z51.11 ENCOUNTER FOR ANTINEOPLASTIC CHEMOTHERAPY: Primary | ICD-10-CM

## 2020-01-23 PROCEDURE — 6360000002 HC RX W HCPCS: Performed by: INTERNAL MEDICINE

## 2020-01-23 PROCEDURE — 96372 THER/PROPH/DIAG INJ SC/IM: CPT

## 2020-01-23 RX ADMIN — PEGFILGRASTIM 6 MG: 6 INJECTION SUBCUTANEOUS at 11:38

## 2020-02-03 ENCOUNTER — HOSPITAL ENCOUNTER (OUTPATIENT)
Age: 70
Discharge: HOME | End: 2020-02-03
Payer: MEDICARE

## 2020-02-03 LAB
ALBUMIN SERPL-MCNC: 3.4 G/DL (ref 3.5–5.2)
ALP BLD-CCNC: 173 U/L (ref 40–129)
ALT SERPL-CCNC: 19 U/L (ref 0–40)
ANION GAP SERPL CALCULATED.3IONS-SCNC: 11 MMOL/L (ref 7–16)
AST SERPL-CCNC: 28 U/L (ref 0–39)
BASOPHILS ABSOLUTE: 0.03 E9/L (ref 0–0.2)
BASOPHILS RELATIVE PERCENT: 0.9 % (ref 0–2)
BILIRUB SERPL-MCNC: 0.5 MG/DL (ref 0–1.2)
BUN BLDV-MCNC: 14 MG/DL (ref 8–23)
CALCIUM SERPL-MCNC: 9.5 MG/DL (ref 8.6–10.2)
CEA: 4.1 NG/ML (ref 0–5.2)
CHLORIDE BLD-SCNC: 102 MMOL/L (ref 98–107)
CO2: 25 MMOL/L (ref 22–29)
CREAT SERPL-MCNC: 1.2 MG/DL (ref 0.7–1.2)
EOSINOPHILS ABSOLUTE: 0.03 E9/L (ref 0.05–0.5)
EOSINOPHILS RELATIVE PERCENT: 0.9 % (ref 0–6)
GFR AFRICAN AMERICAN: >60
GFR NON-AFRICAN AMERICAN: 60 ML/MIN/1.73
GLUCOSE BLD-MCNC: 118 MG/DL (ref 74–99)
HCT VFR BLD CALC: 27.6 % (ref 37–54)
HEMOGLOBIN: 8.5 G/DL (ref 12.5–16.5)
IMMATURE GRANULOCYTES #: 0.15 E9/L
IMMATURE GRANULOCYTES %: 4.7 % (ref 0–5)
LYMPHOCYTES ABSOLUTE: 0.6 E9/L (ref 1.5–4)
LYMPHOCYTES RELATIVE PERCENT: 18.8 % (ref 20–42)
MCH RBC QN AUTO: 30.9 PG (ref 26–35)
MCHC RBC AUTO-ENTMCNC: 30.8 % (ref 32–34.5)
MCV RBC AUTO: 100.4 FL (ref 80–99.9)
MONOCYTES ABSOLUTE: 0.23 E9/L (ref 0.1–0.95)
MONOCYTES RELATIVE PERCENT: 7.2 % (ref 2–12)
NEUTROPHILS ABSOLUTE: 2.15 E9/L (ref 1.8–7.3)
NEUTROPHILS RELATIVE PERCENT: 67.5 % (ref 43–80)
PDW BLD-RTO: 17.6 FL (ref 11.5–15)
PLATELET # BLD: 132 E9/L (ref 130–450)
PMV BLD AUTO: 10.6 FL (ref 7–12)
POTASSIUM SERPL-SCNC: 3.9 MMOL/L (ref 3.5–5)
RBC # BLD: 2.75 E12/L (ref 3.8–5.8)
SODIUM BLD-SCNC: 138 MMOL/L (ref 132–146)
TOTAL PROTEIN: 6.6 G/DL (ref 6.4–8.3)
WBC # BLD: 3.2 E9/L (ref 4.5–11.5)

## 2020-02-03 PROCEDURE — 80053 COMPREHEN METABOLIC PANEL: CPT

## 2020-02-03 PROCEDURE — 85025 COMPLETE CBC W/AUTO DIFF WBC: CPT

## 2020-02-03 PROCEDURE — 36415 COLL VENOUS BLD VENIPUNCTURE: CPT

## 2020-02-03 PROCEDURE — 82378 CARCINOEMBRYONIC ANTIGEN: CPT

## 2020-02-04 ENCOUNTER — HOSPITAL ENCOUNTER (OUTPATIENT)
Dept: INFUSION THERAPY | Age: 70
Discharge: HOME | End: 2020-02-04
Payer: MEDICARE

## 2020-02-04 ENCOUNTER — OFFICE VISIT (OUTPATIENT)
Dept: ONCOLOGY | Age: 70
End: 2020-02-04
Payer: MEDICARE

## 2020-02-04 VITALS — SYSTOLIC BLOOD PRESSURE: 123 MMHG | TEMPERATURE: 97.6 F | DIASTOLIC BLOOD PRESSURE: 72 MMHG | HEART RATE: 64 BPM

## 2020-02-04 VITALS
SYSTOLIC BLOOD PRESSURE: 115 MMHG | TEMPERATURE: 98.4 F | BODY MASS INDEX: 26.69 KG/M2 | DIASTOLIC BLOOD PRESSURE: 72 MMHG | WEIGHT: 201.4 LBS | OXYGEN SATURATION: 96 % | HEART RATE: 71 BPM | HEIGHT: 73 IN

## 2020-02-04 DIAGNOSIS — C20 RECTAL CANCER METASTASIZED TO LIVER (HCC): Primary | ICD-10-CM

## 2020-02-04 DIAGNOSIS — C78.7 RECTAL CANCER METASTASIZED TO LIVER (HCC): Primary | ICD-10-CM

## 2020-02-04 PROCEDURE — 96375 TX/PRO/DX INJ NEW DRUG ADDON: CPT

## 2020-02-04 PROCEDURE — 96411 CHEMO IV PUSH ADDL DRUG: CPT

## 2020-02-04 PROCEDURE — 96368 THER/DIAG CONCURRENT INF: CPT

## 2020-02-04 PROCEDURE — 2580000003 HC RX 258: Performed by: INTERNAL MEDICINE

## 2020-02-04 PROCEDURE — 6360000002 HC RX W HCPCS: Performed by: INTERNAL MEDICINE

## 2020-02-04 PROCEDURE — 99214 OFFICE O/P EST MOD 30 MIN: CPT | Performed by: INTERNAL MEDICINE

## 2020-02-04 PROCEDURE — 96415 CHEMO IV INFUSION ADDL HR: CPT

## 2020-02-04 PROCEDURE — 96413 CHEMO IV INFUSION 1 HR: CPT

## 2020-02-04 RX ORDER — DEXAMETHASONE SODIUM PHOSPHATE 10 MG/ML
10 INJECTION, SOLUTION INTRAMUSCULAR; INTRAVENOUS ONCE
Status: CANCELLED | OUTPATIENT
Start: 2020-02-04

## 2020-02-04 RX ORDER — SODIUM CHLORIDE 9 MG/ML
250 INJECTION, SOLUTION INTRAVENOUS CONTINUOUS
Status: CANCELLED | OUTPATIENT
Start: 2020-02-04

## 2020-02-04 RX ORDER — METHYLPREDNISOLONE SODIUM SUCCINATE 125 MG/2ML
125 INJECTION, POWDER, LYOPHILIZED, FOR SOLUTION INTRAMUSCULAR; INTRAVENOUS ONCE
Status: CANCELLED | OUTPATIENT
Start: 2020-02-04

## 2020-02-04 RX ORDER — SODIUM CHLORIDE 0.9 % (FLUSH) 0.9 %
10 SYRINGE (ML) INJECTION PRN
Status: CANCELLED | OUTPATIENT
Start: 2020-02-04

## 2020-02-04 RX ORDER — ATROPINE SULFATE 0.4 MG/ML
0.4 AMPUL (ML) INJECTION ONCE
Status: COMPLETED | OUTPATIENT
Start: 2020-02-04 | End: 2020-02-04

## 2020-02-04 RX ORDER — FLUOROURACIL 50 MG/ML
850 INJECTION, SOLUTION INTRAVENOUS ONCE
Status: CANCELLED | OUTPATIENT
Start: 2020-02-04

## 2020-02-04 RX ORDER — HEPARIN SODIUM (PORCINE) LOCK FLUSH IV SOLN 100 UNIT/ML 100 UNIT/ML
500 SOLUTION INTRAVENOUS PRN
Status: CANCELLED | OUTPATIENT
Start: 2020-02-04

## 2020-02-04 RX ORDER — HEPARIN SODIUM (PORCINE) LOCK FLUSH IV SOLN 100 UNIT/ML 100 UNIT/ML
500 SOLUTION INTRAVENOUS PRN
Status: DISCONTINUED | OUTPATIENT
Start: 2020-02-04 | End: 2020-02-05 | Stop reason: HOSPADM

## 2020-02-04 RX ORDER — SODIUM CHLORIDE 0.9 % (FLUSH) 0.9 %
10 SYRINGE (ML) INJECTION PRN
Status: DISCONTINUED | OUTPATIENT
Start: 2020-02-04 | End: 2020-02-05 | Stop reason: HOSPADM

## 2020-02-04 RX ORDER — PALONOSETRON HYDROCHLORIDE 0.05 MG/ML
0.25 INJECTION, SOLUTION INTRAVENOUS ONCE
Status: COMPLETED | OUTPATIENT
Start: 2020-02-04 | End: 2020-02-04

## 2020-02-04 RX ORDER — 0.9 % SODIUM CHLORIDE 0.9 %
10 VIAL (ML) INJECTION ONCE
Status: CANCELLED | OUTPATIENT
Start: 2020-02-04

## 2020-02-04 RX ORDER — DEXAMETHASONE SODIUM PHOSPHATE 10 MG/ML
10 INJECTION INTRAMUSCULAR; INTRAVENOUS ONCE
Status: COMPLETED | OUTPATIENT
Start: 2020-02-04 | End: 2020-02-04

## 2020-02-04 RX ORDER — SODIUM CHLORIDE 9 MG/ML
INJECTION, SOLUTION INTRAVENOUS CONTINUOUS
Status: CANCELLED | OUTPATIENT
Start: 2020-02-04

## 2020-02-04 RX ORDER — DIPHENHYDRAMINE HYDROCHLORIDE 50 MG/ML
50 INJECTION INTRAMUSCULAR; INTRAVENOUS ONCE
Status: CANCELLED | OUTPATIENT
Start: 2020-02-04

## 2020-02-04 RX ORDER — FLUOROURACIL 50 MG/ML
850 INJECTION, SOLUTION INTRAVENOUS ONCE
Status: COMPLETED | OUTPATIENT
Start: 2020-02-04 | End: 2020-02-04

## 2020-02-04 RX ORDER — SODIUM CHLORIDE 9 MG/ML
250 INJECTION, SOLUTION INTRAVENOUS CONTINUOUS
Status: DISCONTINUED | OUTPATIENT
Start: 2020-02-04 | End: 2020-02-05 | Stop reason: HOSPADM

## 2020-02-04 RX ORDER — EPINEPHRINE 1 MG/ML
0.3 INJECTION, SOLUTION, CONCENTRATE INTRAVENOUS PRN
Status: CANCELLED | OUTPATIENT
Start: 2020-02-04

## 2020-02-04 RX ORDER — PALONOSETRON HYDROCHLORIDE 0.05 MG/ML
0.25 INJECTION, SOLUTION INTRAVENOUS ONCE
Status: CANCELLED | OUTPATIENT
Start: 2020-02-04

## 2020-02-04 RX ORDER — ATROPINE SULFATE 0.4 MG/ML
0.4 AMPUL (ML) INJECTION ONCE
Status: CANCELLED | OUTPATIENT
Start: 2020-02-04

## 2020-02-04 RX ADMIN — SODIUM CHLORIDE, PRESERVATIVE FREE 10 ML: 5 INJECTION INTRAVENOUS at 12:04

## 2020-02-04 RX ADMIN — BEVACIZUMAB 400 MG: 400 INJECTION, SOLUTION INTRAVENOUS at 09:34

## 2020-02-04 RX ADMIN — PALONOSETRON 0.25 MG: 0.25 INJECTION, SOLUTION INTRAVENOUS at 08:58

## 2020-02-04 RX ADMIN — Medication 500 UNITS: at 12:04

## 2020-02-04 RX ADMIN — DEXAMETHASONE SODIUM PHOSPHATE 10 MG: 10 INJECTION INTRAMUSCULAR; INTRAVENOUS at 08:59

## 2020-02-04 RX ADMIN — ATROPINE SULFATE 0.4 MG: 0.4 INJECTION, SOLUTION INTRAMUSCULAR; INTRAVENOUS; SUBCUTANEOUS at 09:18

## 2020-02-04 RX ADMIN — FLUOROURACIL 850 MG: 50 INJECTION, SOLUTION INTRAVENOUS at 11:59

## 2020-02-04 RX ADMIN — IRINOTECAN HYDROCHLORIDE 400 MG: 20 INJECTION, SOLUTION INTRAVENOUS at 10:18

## 2020-02-04 RX ADMIN — SODIUM CHLORIDE 250 ML: 9 INJECTION, SOLUTION INTRAVENOUS at 08:57

## 2020-02-04 RX ADMIN — LEUCOVORIN CALCIUM 850 MG: 10 INJECTION INTRAMUSCULAR; INTRAVENOUS at 10:18

## 2020-02-04 NOTE — PROGRESS NOTES
on 08/30/2016. Cycle # 4 FOLFIRI/Avastin was on 09/13/2016. CEA 23.4 on 09/13/2016. Cycle # 5 FOLFIRI/Avastin was on 09/27/2016. CEA 22.3 on 09/27/2016. Cycle # 6 FOLFIRI/Avastin was on 10/11/2016. CEA 18.4 on 10/11/2016.      Bone scan 10/21/2016 stable bone metastasis; ? New lesion anterior left sixth rib likely interval healing fracture. CT abdomen/pelvis revealed stable hepatic metastasis. CT chest negative for metastatic disease. Continue FOLFIRI/Avastin and repeat scans in 3 months. Cycle # 7 FOLFIRI/Avastin was on 10/25/2016. CEA 16.1  Cycle # 8 FOLFIRI/Avastin was on 11/08/2016. CEA 15.7  Cycle # 9 FOLFIRI/Avastin was on 11/29/2016. CEA 13.6 on 11/29/2016. Cycle # 10 FOLFIRI/Avastin was on 12/13/2016. CEA 10.6 on 12/13/2016. Cycle # 11 FOLFIRI/Avastin was on 12/27/2016. CEA 11.1 on 12/27/2016. Cycle # 12 FOLFIRI/Avastin was on 01/10/2017. CEA 9.7 on 01/10/2017.       CT chest 01/23/2017 No new pulmonary nodule or lymphadenopathy. Patchy groundglass opacities within the left lower lobe, suggestive of infectious or inflammatory etiology. Followup CT chest in 3 months. Slight increased linear sclerosis along the left anterior sixth rib corresponding to an area of increased uptake on the prior bone scan from 10/21/2016, favored to be related to interval healing changes of a nondisplaced fracture. CT abdomen/pelvis on 01/23/2017 Redemonstration of multiple hepatic metastases, which are similar compared to the prior CT from 10/21/2016. Redemonstration of area of increased sclerosis along the right acetabulum suspicious for metastatic disease. Bone scan on 01/23/2017 Stable subtle uptake within the left proximal diaphysis, again suggestive of metastatic disease  Decreased subtle uptake within the medial right acetabulum. Decreased uptake within the left anterior sixth rib corresponding to linear sclerosis on the recent CT, favored to be related to an joint rib fracture.     Continue FOLFIRI + Avastin 10/31/2017. Cycle # 34 FOLFIRI + Avastin was on 11/14/2017. CEA 7.2 on 11/14/2017. Cycle # 35 FOLFIRI + Avastin was on 11/28/2017. CEA 5.1 on 11/28/2017. Cycle # 36 FOLFIRI + Avastin was on 12/12/2017. CEA 6.1 on 12/12/2017. Bone scan 12/22/2017 noted no evidence of metastatic disease to the axial or appendicular skeleton. CT chest 12/22/2017 noted no evidence of metastatic disease. CT abdomen/pelvis 12/22/2017 noted stable hypodense lesions in the liver. Continue FOLFIRI + Avastin and repeat scans in 3 months. Cycle # 37 FOLFIRI + Avastin was on 12/27/2018. Cycle # 38 FOLFIRI + Avastin was on 01/09/2018. Cycle # 39 FOLFIRI + Avastin was on 01/23/2018. Cycle # 40 FOLFIRI + Avastin was on 02/06/2018. Cycle # 41 FOLFIRI + Avastin was on 02/20/2018. Cycle # 42 FOLFIRI + Avastin was on 03/06/2018. Cycle # 43 FOLFIRI + Avastin was on 03/20/2018. Bone scan on 03/26/2018 negative for metastatic disease. CT chest 03/26/2018 noted ? new 7 mm nodule in LUCY. CT abdomen/pelvis 03/26/2018 noted stable hypodense lesions in the liver. ? New small ascites in the abdomen. Patient wants to continue to monitor the lung finding and repeat scans in 2 months instead of changing the current regimen into oral Lonsurf. Cycle # 44 FOLFIRI + Avastin was on 04/03/2018. CEA 6.3 on 04/03/2018. Cycle # 45 FOLFIRI + Avastin was on 04/24/2018. CEA 5.3 on 04/24/2018. Cycle # 46 FOLFIRI + Avastin was on 05/08/2018. CEA 5.4 on 05/08/2018. Cycle # 47 FOLFIRI + Avastin was on 05/22/2018. CEA 6.1 on 05/22/2018. CT chest 05/31/2018 noted stable nodule in LUCY. No evidence of worsening malignancy. CT abdomen/pelvis on 05/31/2018 noted stable liver metastasis. No evidence of worsening malignancy. Continue FOLFIRI + Avastin and repeat scans in 3 months. Cycle # 48 FOLFIRI + Avastin was on 06/06/2018. Cycle # 49 FOLFIRI + Avastin was on 06/19/2018. Cycle # 50 FOLFIRI + Avastin was on 07/03/2018.   Cycle # 51 FOLFIRI + abdomen/pelvis 12/19/2019 Previously described small hypodense lesions are more conspicuous on the current study compared to the recent study throughout the liver from 8/20/2019, however similar to the prior study from 2/21/2019. Findings may be related to differences in contrast opacification. No abdominal or pelvic lymphadenopathy. Continue FOLFIRI + Avastin and repeat scans in 2 months to f/u on liver lesions. Cycle # 81 FOLFIRI + Avastin was on 01/07/2020. CEA 3.8 on 01/06/2020. Cycle # 82 FOLFIRI + Avastin was on 01/21/2020. CEA 3.7 on 01/20/2020. Review of Systems;  CONSTITUTIONAL: No fever, chills. Fair appetite and energy level. ENMT: Eyes: recent eye surgery; Nose: No epistaxis. Mouth: No sore throat  RESPIRATORY: No hemoptysis, shortness of breath. CARDIOVASCULAR: No chest pain, palpitations. GASTROINTESTINAL: No nausea/vomiting, abdominal pain. Intermittent diarrhea  GENITOURINARY: No dysuria, urinary frequency, hematuria. NEURO: No syncope, presyncope, headache. Remainder ROS: Negative. Past Medical History:   Past Medical History               Diagnosis Date    Arthritis      Cancer ([facility] [address]         colon    Depression      Hyperlipidemia      Hypertension           Medications:  Reviewed and reconciled.     Allergies: Allergies   Allergen Reactions    Neosporin [Neomycin-Polymyxin-Gramicidin] Rash    Tape Jolane Joann Tape] Rash      Physical Exam:  /72 (Site: Right Upper Arm, Position: Sitting, Cuff Size: Medium Adult)   Pulse 71   Temp 98.4 °F (36.9 °C) (Temporal)   Ht 6' 1\" (1.854 m)   Wt 201 lb 6.4 oz (91.4 kg)   SpO2 96%   BMI 26.57 kg/m²   GENERAL: Alert, oriented x 3, not in acute distress  HEENT: PERRLA, EOMI. No oral lesions. NECK: Supple. Without lymphadenopathy. LUNGS: Lungs are CTA bilaterally, with no wheezing, crackles or rhonchi. CARDIOVASCULAR: Regular rhythm. No murmurs, rubs or gallops. ABDOMEN: Soft. Non-tender, non-distended.  Positive bowel sounds. EXTREMITIES: Without clubbing, cyanosis, or edema. NEUROLOGIC: No focal deficits. 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 KRAS Mutation: Mutation detected. BRAF Mutation: Mutation not detected. 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 CT scan abdomen/pelvis on 10/26/2015 revealed small nodules at the lung bases, extensive liver lesions consistent with metastatic rectosigmoid cancer. CEA 3488 on 10/30/2015. Bone scan on 11/10/2015 noted no metastatic disease. CT chest on 11/10/2015 revealed Multiple pulmonary nodules in the upper and lower lobes consistent with metastatic disease; Hepatic metastasis also visualized. 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 Mediport was placed. Cycle # 1 of FOLFOX + Avastin was on 11/23/2015. CEA was 4555 on 11/23/2015. Cycle # 2 of FOLFOX + Avastin was on 12/08/2015. CEA was 3160 on 12/08/2015. Cycle # 3 of FOLFOX + Avastin was on 12/22/2015. CEA was 2516 on 12/21/2015. Cycle # 4 of FOLFOX + Avastin was on 01/05/2016. CEA was 2098 on 01/05/2015. Cycle # 5 of FOLFOX + Avastin was on 01/19/2016. CEA was 1511 on 01/05/2015.     -Bone scan on 01/26/2016 noted no metastatic disease. CT chest on 01/26/2016 revealed Significant response to treatment with no visible residual nodules. CT scan abdomen/pelvis on 01/26/2016 noted Interval decreased size of multiple masses in the liver compatible with treatment response. Continue another 2 months of FOLFOX + Avastin and repeat scans. Cycle # 6 of FOLFOX + Avastin was on 02/02/2016.  on 02/02/2016. Cycle # 7 of FOLFOX + Avastin was on 02/16/2016.  on 02/16/2016. Cycle # 8 of FOLFOX + Avastin was on 03/01/2016.  on 03/01/2016. Cycle # 9 of FOLFOX + Avastin was on 03/15/2016.  on 03/15/2016. Cycle # 10 of FOLFOX + Avastin was on 03/29/2016.  .4 on 03/29/2016. Cycle # 11 of FOLFOX + Avastin was on 04/12/2016. .4 on 04/12/2016.     Re-staging scans on 04/19/2016: CT Chest: clear lungs; no evidence of recurring pulmonary nodule; CT Abdomen/Pelvis: Further interval decrease in size of the multiple metastatic hepatic lesions, the largest lesion now measures 3.9 x 3.5 cm and previously measured 4.5 cm in maximum diameter. No mesenteric lymphadenopathy is identified; Bone Scan: No evidence of osseous metastasis. Continue same regimen and re-stage in 2-3 months. Cycle # 12 FOLFOX + Avastin was on 04/26/2016. .5 on 04/26/2016. Cycle # 13 FOLFOX + Avastin was on 05/10/2016. .3 on 05/10/2016. Cycle # 14 FOLFOX+AVASTIN was on 05/24/2016. .5 on 05/24/2016. Cycle # 15 FOLFOX + Avastin was on 06/07/2016. CEA 90.5 on 06/07/2016. Admitted to [facility] 06/13/2016-06/16/2016 for abdominal pain: EGD noted 1.5 cm clean based duodenal bulb ulceration s/p epinephrine and bicap per Dr. Cuevas Cap. No active bleeding. A. Stomach, biopsy: Mild chronic gastritis, immunostain negative for Helicobacter  B. Esophagus, biopsy: Gastric glandular mucosa with prominent ntestinal metaplasia ([place]'s epithelium), negative for epithelial dysplasia, esophageal squamous mucosa not identified     Cycle # 16 FOLFOX (discontinued avastin (bevacizumab) given association of peptic ulcer disease and known association of GI perforation.) was on 06/21/2016. CEA 47 on 06/21/2016. Cycle # 17 FOLFOX was on 07/05/2016. CEA 52.6 on 07/05/2016. CEA 47.6 on 07/19/2016.     Re-staging scans 07/19/2106: CT Chest negative for metastatic disease. CT Abdomen/Pelvis: Stable hepatic lesions; Question new mural thickening in the cecum. Bone Scan: New Let hip lesion suspicious for bone metastasis.     Increased CEA; new mural thickening in the cecum and new left hip lesion suspicious for bone metastasis are consistent with disease progression;  He derived maximum benefit from FOLFOX/AVASTIN. D/C FOLFOX/AVASTIN. We recommended FOLFIRI second line therapy. Cycle # 1 FOLFIRI was on 08/02/2016. CEA 40.8 on 08/02/2016. Xgeva q4 weeks started on 08/02/2016. Cycle # 2 FOLFIRI was on 08/16/2016. CEA 31.7 on 08/16/2016. Cycle # 3 FOLFIRI (added Avastin) was on 08/30/2016 given that ulcers healed on EGD 08/15/2016 by Dr. Chris Irene; Protonix bid since avastin re-started. Colonoscopy on 08/29/2016 (to look at the cecal area) unremarkable. CEA 26.7 on 08/30/2016. Cycle # 4 FOLFIRI/Avastin was on 09/13/2016. CEA 23.4 on 09/13/2016. Cycle # 5 FOLFIRI/Avastin was on 09/27/2016. CEA 22.3 on 09/27/2016. Cycle # 6 FOLFIRI/Avastin was on 10/11/2016. CEA 18.4 on 10/11/2016.      Bone scan 10/21/2016 stable bone metastasis; ? New lesion anterior left sixth rib likely interval healing fracture. CT abdomen/pelvis revealed stable hepatic metastasis. CT chest negative for metastatic disease. Continue FOLFIRI/Avastin and repeat scans in 3 months. Cycle # 7 FOLFIRI/Avastin was on 10/25/2016. CEA 16.1 on 10/25/2016. Cycle # 8 FOLFIRI/Avastin was on 11/08/2016. CEA 15.7 on 11/08/2016. Cycle # 9 FOLFIRI/Avastin was on 11/29/2016. CEA 13.6 on 11/29/2016. Cycle # 10 FOLFIRI/Avastin was on 12/13/2016. CEA 10.6 on 12/13/2016. Cycle # 11 FOLFIRI/Avastin was on 12/27/2016. CEA 11.1 on 12/27/2016. Cycle # 12 FOLFIRI/Avastin was on 01/10/2017. CEA 9.7 on 01/10/2017. CT chest 01/23/2017 No new pulmonary nodule or lymphadenopathy. Patchy groundglass opacities within the left lower lobe, suggestive of infectious or inflammatory etiology. Followup CT chest in 3 months. Slight increased linear sclerosis along the left anterior sixth rib corresponding to an area of increased uptake on the prior bone scan from 10/21/2016, favored to be related to interval healing changes of a nondisplaced fracture.    CT abdomen/pelvis on 01/23/2017 Redemonstration of multiple hepatic metastases, which are similar compared to the prior CT from 10/21/2016. Redemonstration of area of increased sclerosis along the right acetabulum suspicious for metastatic disease. Bone scan on 01/23/2017 Stable subtle uptake within the left proximal diaphysis, again suggestive of metastatic disease  Decreased subtle uptake within the medial right acetabulum. Decreased uptake within the left anterior sixth rib corresponding to linear sclerosis on the recent CT, favored to be related to an joint rib fracture. Continue FOLFIRI + Avastin and repeat scans in 3 months. Cycle # 13 FOLFIRI + Avastin was on 01/24/2017. Cycle # 14 FOLFIRI + Avastin was on 02/07/2017. Cycle # 15 FOLFIRI + Avastin was on 02/21/2017. Cycle # 16 FOLFIRI + Avastin was on 03/07/2017. Cycle # 17 FOLFIRI + Avastin was on 03/21/2017. Cycle # 18 FOLFIRI + Avastin was on 04/04/2017. CT chest 04/17/2017 negative for metastatic disease. CT abdomen/pelvis 04/17/2017 Stable hypodense metastatic lesions within the liver. Stable area of increased sclerosis along the right acetabulum  Bone scan 04/17/2017 Stable subtle uptake within the left proximal femoral diaphysis; No definite abnormal uptake in the region of a sclerotic lesion along the posterior column of the right acetabulum noted on CT. Stable slight uptake within the left anterior sixth rib corresponding to linear sclerosis on the recent CT, favored to be related to a fracture. Continue FOLFIRI + Avastin and repeat scans in 3 months. Cycle # 19 FOLFIRI + Avastin was on 04/18/2017. CEA 7.5 on 04/18/2017. Cycle # 20 FOLFIRI + Avastin was on 05/02/2017. CEA 7.7 on 05/02/2017. Cycle # 21 FOLFIRI + Avastin was on 05/16/2017. CEA 7.1 on 05/16/2017. Cycle # 22 FOLFIRI + Avastin was on 05/30/2017. CEA 8.2 on 05/30/2017. Cycle # 23 FOLFIRI + Avastin was on 06/13/2017. CEA 7.7 on 06/13/2017. Cycle # 24 FOLFIRI + Avastin was on 06/27/2017. CEA 6.6 on 06/27/2017.    Re-staging scans 07/05/2017:  Bone scan stable proximal left femoral diaphysis, right acetabulum, and left anterior sixth rib lesions;  CT abdomen/pelvis stable hypodense metastatic lesions within the liver; CT chest without convincing evidence of metastatic disease. Cycle # 25 FOLFIRI + Avastin was on 07/11/2017. CEA 6.3 on 07/11/2017. Cycle # 26 FOLFIRI + Avastin was on 07/25/2017. CEA 7.3 on 07/25/2017. Cycle # 27 FOLFIRI + Avastin was on 08/08/2017. CEA 6.5 on 08/08/2017. Cycle # 28 FOLFIRI + Avastin was on 08/22/2017. CEA 5.9 on 08/22/2017. Cycle # 29 FOLFIRI + Avastin was on 09/05/2017. CEA 6.3 on 09/05/2017. Cycle # 30 FOLFIRI + Avastin was on 09/19/2017. CEA 6.3 on 09/19/2017. Bone scan 09/26/2017 stable. CT abdomen/pelvis on 09/26/2017 Stable hypodense mass in the liver. Stable sclerotic lesion in the acetabulum. CT chest 09/26/2017 negative for metastatic disease. Cycle # 31 FOLFIRI + Avastin was on 10/03/2017. CEA 7.4 on 10/03/2017. Cycle # 32 FOLFIRI + Avastin was on 10/17/2017. CEA 6.0 on 10/17/2017. Cycle # 33 FOLFIRI + Avastin was on 10/31/2017. CEA 6.8 on 10/31/2017. Cycle # 34 FOLFIRI + Avastin was on 11/14/2017. CEA 7.2 on 11/14/2017. Cycle # 35 FOLFIRI + Avastin was on 11/28/2017. CEA 5.1 on 11/28/2017. Cycle # 36 FOLFIRI + Avastin was on 12/12/2017. CEA 6.1 on 12/12/2017. Bone scan 12/22/2017 noted no evidence of metastatic disease to the axial or appendicular skeleton. CT chest 12/22/2017 noted no evidence of metastatic disease. CT abdomen/pelvis 12/22/2017 noted stable hypodense lesions in the liver. Continue FOLFIRI + Avastin and repeat scans in 3 months. Cycle # 37 FOLFIRI + Avastin was on 12/27/2018. CEA 6.7 on 12/27/2017. Cycle # 38 FOLFIRI + Avastin was on 01/09/2018. CEA 5.0 on 01/09/2018. Cycle # 39 FOLFIRI + Avastin was on 01/23/2018. CEA 6.5 on 01/23/2018. Cycle # 40 FOLFIRI + Avastin was on 02/06/2018. CEA 6.9 on 02/06/2018. Cycle # 41 FOLFIRI + Avastin was on 02/20/2018.  CEA 6.4 on 02/20/2018. Cycle # 42 FOLFIRI + Avastin was on 03/06/2018. CEA 6.6 on 03/06/2018. Cycle # 43 FOLFIRI + Avastin was on 03/20/2018. CEA 5.7 on 03/20/2018. Bone scan on 03/26/2018 negative for metastatic disease. CT chest 03/26/2018 noted ? new 7 mm nodule in LUCY. CT abdomen/pelvis 03/26/2018 noted stable hypodense lesions in the liver. ? New small ascites in the abdomen. Patient wants to continue to monitor the lung finding and repeat scans in 2 months instead of changing the current chemotherapy regimen to oral Lonsurf. Cycle # 44 FOLFIRI + Avastin was on 04/03/2018. CEA 6.3 on 04/03/2018. Cycle # 45 FOLFIRI + Avastin was on 04/24/2018. CEA 5.3 on 04/24/2018. Cycle # 46 FOLFIRI + Avastin was on 05/08/2018. CEA 5.4 on 05/08/2018. Cycle # 47 FOLFIRI + Avastin was on 05/22/2018. CEA 6.1 on 05/22/2018. CT chest 05/31/2018 noted stable nodule in LUCY. No evidence of worsening malignancy. CT abdomen/pelvis on 05/31/2018 noted stable liver metastasis. No evidence of worsening malignancy. Continue FOLFIRI + Avastin and repeat scans in 3 months. Cycle # 48 FOLFIRI + Avastin was on 06/06/2018. CEA 6.3 on 06/05/2018. Cycle # 49 FOLFIRI + Avastin was on 06/19/2018. CEA 6.1 on 06/19/2018. Cycle # 50 FOLFIRI + Avastin was on 07/03/2018. CEA 7.4 on 07/03/2018. Cycle # 51 FOLFIRI + Avastin was on 07/24/2018. CEA 6.7 on 07/24/2018. Cycle # 52 FOLFIRI + Avastin was on 08/14/2018. CEA 6.8 on 08/14/2018. Cycle # 53 FOLFIRI + Avastin was on 08/28/2018. CEA 6.5 on 08/27/2018. CT chest 09/06/2018 noted interval decrease in size of LUCY measuring up to 4 mm, suggestive of treatment response. No mediastinal or hilar LN  CT abdomen/pelvis 09/06/2018 Slight interval increase in size of a previously noted metastatic lesion within the right hepatic lobe.  This may be related to differences in phase of enhancement compared to the most recent study from 5/31/2018, the lesion remains decreased in size compared to the more previous studies. No abdominal, retroperitoneal, or pelvic lymphadenopathy. Continue FOLFIRI + Avastin and repeat scans in 2 months. Cycle # 54 FOLFIRI + Avastin was on 09/11/2018. CEA 6.4 on 09/10/2018. Cycle # 55 FOLFIRI + Avastin was on 09/25/2018. CEA 6.4 on 09/25/2018. Cycle # 56 FOLFIRI + Avastin was on 10/09/2018. CEA 6.7 on 10/08/2018. Cycle # 57 FOLFIRI + Avastin was on 10/23/2018. CEA 7.2 on 10/22/2018. CT chest 11/02/2018 stable 3 mm nodule within LUCY. No new right and left lung nodule. No mediastinal or osseous lesion. CT abdomen/pelvis 11/02/2018 stable metastatic disease to liver. No new metastatic disease identified. No mesenteric or retroperitoneal LN. No gross colonic lesion identified. Continue FOLFIRI + Avastin and repeat scans in 3 months. Cycle # 58 FOLFIRI + Avastin was on 11/06/2018. CEA 7.6 on 11/05/2018. Cycle # 59 FOLFIRI + Avastin was on 11/27/2018. CEA 6.9 on 11/26/2018. Cycle # 60 FOLFIRI + Avastin was on 12/11/2018. CEA 6.7 on 12/11/2018. Cycle # 61 FOLFIRI + Avastin was on 01/08/2019. CEA 5.6 on 01/07/2019. Cycle # 62 FOLFIRI + Avastin was on 01/29/2019. CEA 4.6 on 01/28/2019. Cycle # 63 FOLFIRI + Avastin was on 02/12/2019. CEA 4.3 on 02/11/2019. CT chest 02/21/2019 no evidence of metastatic disease. CT abdomen/pelvis 02/21/2019 stable hypodense liver lesions. No evidence of worsening metastatic disease. 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 Ordered MRI thoracic spine and referred to neurosurgery team.  Cycle # 64 FOLFIRI + Avastin was on 02/26/2019. CEA 4.7 on 02/25/2019. Cycle # 65 FOLFIRI + Avastin was on 03/12/2019. CEA 4.0 on 03/11/2019. Cycle # 66 FOLFIRI + Avastin was on 03/26/2019. CEA 4.7 on 03/25/2019. Cycle # 67 FOLFIRI + Avastin was on 04/09/2019. CEA 5.6 on 04/08/2019. Cycle # 68 FOLFIRI + Avastin was on 04/30/2019. CEA 4.9 on 04/29/2019. Cycle # 69 FOLFIRI + Avastin was on 05/14/2019.  CEA 5.3 on 05/14/2019. CT chest 05/23/2019 stable small lung nodule with no evidence of metastatic disease. CT abdomen/pelvis 05/23/2019 Decrease conspicuity of the liver lesions. No new lesions seen. Stable splenomegaly. Continue FOLFIRI + Avastin and repeat scans in 3 months. Cycle # 70 FOLFIRI + Avastin was on 06/04/2019. CEA 4.8 on 06/03/2019. Cycle # 71 FOLFIRI + Avastin was on 06/18/2019. CEA 4.6 on 06/17/2019. Cycle # 72 FOLFIRI + Avastin was on 07/09/2019. CEA 4.3 on 07/08/2019. Cycle # 73 FOLFIRI + Avastin was on 07/30/2019. CEA 5.0 on 07/29/2019. Cycle # 74 FOLFIRI + Avastin was on 08/13/2019. CEA 5.0 on 08/12/2019. CT chest 08/20/2019 negative  CT abdomen/pelvis 08/20/2019 Previous identified hepatic lesions are not visualized on the current exam.  Continue FOLFIRI + Avastin and repeat scans in 3 months. Cycle # 75 FOLFIRI + Avastin was on 08/27/2019. CEA 5.0 on 08/26/2019. Cycle # 76 FOLFIRI + Avastin was on 09/17/2019. CEA 5.5 on 09/16/2019. Cycle # 77 FOLFIRI + Avastin was on 10/15/2019. CEA 4.6 on 10/15/2019. Cycle # 78 FOLFIRI + Avastin was on 10/29/2019. CEA 4.1 on 10/28/2019. Cycle # 79 FOLFIRI + Avastin was on 11/12/2019. CEA 4.3 on 11/12/2019. Cycle # 80 FOLFIRI + Avastin was on 12/10/2019. CEA 4.0 on 12/09/2019. CT chest 12/19/2019 Stable 3 mm nodule within the left upper lobe, similar to the previous studies dating back to 11/2/2018, however decreased in size compared to the CT from 3/26/2018. No thoracic LN. CT abdomen/pelvis 12/19/2019 Previously described small hypodense lesions are more conspicuous on the current study compared to the recent study throughout the liver from 8/20/2019, however similar to the prior study from 2/21/2019. Findings may be related to differences in contrast opacification. No abdominal or pelvic lymphadenopathy. Continue FOLFIRI + Avastin and repeat scans in 2 months to f/u on liver lesions. Cycle # 81 FOLFIRI + Avastin was on 01/07/2020.   CEA 3.8

## 2020-02-04 NOTE — PROGRESS NOTES
Chemotherapy pump orders faxed to [facility] @ [phone] and call placed to [phone] and spoke with Malina Oliveira ( [facility]) to confirm receipt. She will notify the nurses/pharmacist as they are currently in a meeting. The pump will be administered in the home and de accessed in 46 hrs. Encouraged to call with questions/concerns.

## 2020-02-06 ENCOUNTER — HOSPITAL ENCOUNTER (OUTPATIENT)
Dept: INFUSION THERAPY | Age: 70
Discharge: HOME | End: 2020-02-06
Payer: MEDICARE

## 2020-02-06 DIAGNOSIS — C20 RECTAL CANCER METASTASIZED TO LIVER (HCC): ICD-10-CM

## 2020-02-06 DIAGNOSIS — C78.7 RECTAL CANCER METASTASIZED TO LIVER (HCC): ICD-10-CM

## 2020-02-06 DIAGNOSIS — Z51.11 ENCOUNTER FOR ANTINEOPLASTIC CHEMOTHERAPY: Primary | ICD-10-CM

## 2020-02-06 PROCEDURE — 96372 THER/PROPH/DIAG INJ SC/IM: CPT

## 2020-02-06 PROCEDURE — 6360000002 HC RX W HCPCS: Performed by: INTERNAL MEDICINE

## 2020-02-06 RX ADMIN — PEGFILGRASTIM 6 MG: 6 INJECTION SUBCUTANEOUS at 15:33

## 2020-02-11 RX ORDER — DIPHENOXYLATE HYDROCHLORIDE AND ATROPINE SULFATE 2.5; .025 MG/1; MG/1
1 TABLET ORAL 4 TIMES DAILY PRN
Qty: 40 TABLET | Refills: 1 | Status: SHIPPED | OUTPATIENT
Start: 2020-02-11 | End: 2020-02-21

## 2020-02-17 ENCOUNTER — HOSPITAL ENCOUNTER (OUTPATIENT)
Age: 70
Discharge: HOME | End: 2020-02-17
Payer: MEDICARE

## 2020-02-17 LAB
ALBUMIN SERPL-MCNC: 3.6 G/DL (ref 3.5–5.2)
ALP BLD-CCNC: 161 U/L (ref 40–129)
ALT SERPL-CCNC: 17 U/L (ref 0–40)
ANION GAP SERPL CALCULATED.3IONS-SCNC: 11 MMOL/L (ref 7–16)
ANISOCYTOSIS: ABNORMAL
AST SERPL-CCNC: 27 U/L (ref 0–39)
BASOPHILS ABSOLUTE: 0.03 E9/L (ref 0–0.2)
BASOPHILS RELATIVE PERCENT: 1.8 % (ref 0–2)
BILIRUB SERPL-MCNC: 0.7 MG/DL (ref 0–1.2)
BUN BLDV-MCNC: 15 MG/DL (ref 8–23)
CALCIUM SERPL-MCNC: 8.8 MG/DL (ref 8.6–10.2)
CEA: 4.6 NG/ML (ref 0–5.2)
CHLORIDE BLD-SCNC: 106 MMOL/L (ref 98–107)
CO2: 24 MMOL/L (ref 22–29)
CREAT SERPL-MCNC: 1.2 MG/DL (ref 0.7–1.2)
DOHLE BODIES: ABNORMAL
EOSINOPHILS ABSOLUTE: 0.02 E9/L (ref 0.05–0.5)
EOSINOPHILS RELATIVE PERCENT: 0.9 % (ref 0–6)
GFR AFRICAN AMERICAN: >60
GFR NON-AFRICAN AMERICAN: 60 ML/MIN/1.73
GLUCOSE BLD-MCNC: 145 MG/DL (ref 74–99)
HCT VFR BLD CALC: 25.3 % (ref 37–54)
HEMOGLOBIN: 8 G/DL (ref 12.5–16.5)
LYMPHOCYTES ABSOLUTE: 0.55 E9/L (ref 1.5–4)
LYMPHOCYTES RELATIVE PERCENT: 29.2 % (ref 20–42)
MCH RBC QN AUTO: 30.9 PG (ref 26–35)
MCHC RBC AUTO-ENTMCNC: 31.6 % (ref 32–34.5)
MCV RBC AUTO: 97.7 FL (ref 80–99.9)
MONOCYTES ABSOLUTE: 0.08 E9/L (ref 0.1–0.95)
MONOCYTES RELATIVE PERCENT: 4.4 % (ref 2–12)
MYELOCYTE PERCENT: 0.9 % (ref 0–0)
NEUTROPHILS ABSOLUTE: 1.22 E9/L (ref 1.8–7.3)
NEUTROPHILS RELATIVE PERCENT: 62.8 % (ref 43–80)
NUCLEATED RED BLOOD CELLS: 0.9 /100 WBC
OVALOCYTES: ABNORMAL
PDW BLD-RTO: 18.2 FL (ref 11.5–15)
PLATELET # BLD: 102 E9/L (ref 130–450)
PMV BLD AUTO: 10.6 FL (ref 7–12)
POIKILOCYTES: ABNORMAL
POLYCHROMASIA: ABNORMAL
POTASSIUM SERPL-SCNC: 3.8 MMOL/L (ref 3.5–5)
RBC # BLD: 2.59 E12/L (ref 3.8–5.8)
SODIUM BLD-SCNC: 141 MMOL/L (ref 132–146)
TOTAL PROTEIN: 6.4 G/DL (ref 6.4–8.3)
TOXIC GRANULATION: ABNORMAL
WBC # BLD: 1.9 E9/L (ref 4.5–11.5)

## 2020-02-17 PROCEDURE — 85025 COMPLETE CBC W/AUTO DIFF WBC: CPT

## 2020-02-17 PROCEDURE — 36415 COLL VENOUS BLD VENIPUNCTURE: CPT

## 2020-02-17 PROCEDURE — 80053 COMPREHEN METABOLIC PANEL: CPT

## 2020-02-17 PROCEDURE — 82378 CARCINOEMBRYONIC ANTIGEN: CPT

## 2020-02-18 ENCOUNTER — HOSPITAL ENCOUNTER (OUTPATIENT)
Dept: INFUSION THERAPY | Age: 70
Discharge: HOME | End: 2020-02-18
Payer: MEDICARE

## 2020-02-18 ENCOUNTER — OFFICE VISIT (OUTPATIENT)
Dept: ONCOLOGY | Age: 70
End: 2020-02-18
Payer: MEDICARE

## 2020-02-18 VITALS
HEIGHT: 73 IN | SYSTOLIC BLOOD PRESSURE: 110 MMHG | DIASTOLIC BLOOD PRESSURE: 60 MMHG | HEART RATE: 72 BPM | TEMPERATURE: 98 F | WEIGHT: 200.1 LBS | BODY MASS INDEX: 26.52 KG/M2

## 2020-02-18 VITALS — SYSTOLIC BLOOD PRESSURE: 119 MMHG | HEART RATE: 69 BPM | DIASTOLIC BLOOD PRESSURE: 66 MMHG

## 2020-02-18 DIAGNOSIS — C79.51 BONE METASTASIS (HCC): ICD-10-CM

## 2020-02-18 DIAGNOSIS — C20 RECTAL CANCER METASTASIZED TO LIVER (HCC): Primary | ICD-10-CM

## 2020-02-18 DIAGNOSIS — C78.7 RECTAL CANCER METASTASIZED TO LIVER (HCC): Primary | ICD-10-CM

## 2020-02-18 PROCEDURE — 96411 CHEMO IV PUSH ADDL DRUG: CPT

## 2020-02-18 PROCEDURE — 96416 CHEMO PROLONG INFUSE W/PUMP: CPT

## 2020-02-18 PROCEDURE — 96368 THER/DIAG CONCURRENT INF: CPT

## 2020-02-18 PROCEDURE — 6360000002 HC RX W HCPCS: Performed by: INTERNAL MEDICINE

## 2020-02-18 PROCEDURE — 96415 CHEMO IV INFUSION ADDL HR: CPT

## 2020-02-18 PROCEDURE — 6360000002 HC RX W HCPCS: Performed by: NURSE PRACTITIONER

## 2020-02-18 PROCEDURE — 99214 OFFICE O/P EST MOD 30 MIN: CPT | Performed by: INTERNAL MEDICINE

## 2020-02-18 PROCEDURE — 96375 TX/PRO/DX INJ NEW DRUG ADDON: CPT

## 2020-02-18 PROCEDURE — 96417 CHEMO IV INFUS EACH ADDL SEQ: CPT

## 2020-02-18 PROCEDURE — 96372 THER/PROPH/DIAG INJ SC/IM: CPT

## 2020-02-18 PROCEDURE — 96413 CHEMO IV INFUSION 1 HR: CPT

## 2020-02-18 PROCEDURE — 2580000003 HC RX 258: Performed by: INTERNAL MEDICINE

## 2020-02-18 RX ORDER — DIPHENHYDRAMINE HYDROCHLORIDE 50 MG/ML
50 INJECTION INTRAMUSCULAR; INTRAVENOUS ONCE
Status: CANCELLED | OUTPATIENT
Start: 2020-02-18

## 2020-02-18 RX ORDER — DEXAMETHASONE SODIUM PHOSPHATE 10 MG/ML
10 INJECTION, SOLUTION INTRAMUSCULAR; INTRAVENOUS ONCE
Status: CANCELLED | OUTPATIENT
Start: 2020-02-18

## 2020-02-18 RX ORDER — 0.9 % SODIUM CHLORIDE 0.9 %
10 VIAL (ML) INJECTION ONCE
Status: CANCELLED | OUTPATIENT
Start: 2020-02-18

## 2020-02-18 RX ORDER — ATROPINE SULFATE 0.4 MG/ML
0.4 AMPUL (ML) INJECTION ONCE
Status: CANCELLED | OUTPATIENT
Start: 2020-02-18

## 2020-02-18 RX ORDER — PALONOSETRON HYDROCHLORIDE 0.05 MG/ML
0.25 INJECTION, SOLUTION INTRAVENOUS ONCE
Status: COMPLETED | OUTPATIENT
Start: 2020-02-18 | End: 2020-02-18

## 2020-02-18 RX ORDER — ATROPINE SULFATE 0.4 MG/ML
0.4 AMPUL (ML) INJECTION ONCE
Status: COMPLETED | OUTPATIENT
Start: 2020-02-18 | End: 2020-02-18

## 2020-02-18 RX ORDER — FLUOROURACIL 50 MG/ML
850 INJECTION, SOLUTION INTRAVENOUS ONCE
Status: COMPLETED | OUTPATIENT
Start: 2020-02-18 | End: 2020-02-18

## 2020-02-18 RX ORDER — SODIUM CHLORIDE 0.9 % (FLUSH) 0.9 %
10 SYRINGE (ML) INJECTION PRN
Status: CANCELLED | OUTPATIENT
Start: 2020-02-18

## 2020-02-18 RX ORDER — HEPARIN SODIUM (PORCINE) LOCK FLUSH IV SOLN 100 UNIT/ML 100 UNIT/ML
500 SOLUTION INTRAVENOUS PRN
Status: DISCONTINUED | OUTPATIENT
Start: 2020-02-18 | End: 2020-02-19 | Stop reason: HOSPADM

## 2020-02-18 RX ORDER — SODIUM CHLORIDE 0.9 % (FLUSH) 0.9 %
10 SYRINGE (ML) INJECTION PRN
Status: DISCONTINUED | OUTPATIENT
Start: 2020-02-18 | End: 2020-02-19 | Stop reason: HOSPADM

## 2020-02-18 RX ORDER — FLUOROURACIL 50 MG/ML
850 INJECTION, SOLUTION INTRAVENOUS ONCE
Status: CANCELLED | OUTPATIENT
Start: 2020-02-18

## 2020-02-18 RX ORDER — PALONOSETRON HYDROCHLORIDE 0.05 MG/ML
0.25 INJECTION, SOLUTION INTRAVENOUS ONCE
Status: CANCELLED | OUTPATIENT
Start: 2020-02-18

## 2020-02-18 RX ORDER — DEXAMETHASONE SODIUM PHOSPHATE 10 MG/ML
10 INJECTION INTRAMUSCULAR; INTRAVENOUS ONCE
Status: COMPLETED | OUTPATIENT
Start: 2020-02-18 | End: 2020-02-18

## 2020-02-18 RX ORDER — SODIUM CHLORIDE 9 MG/ML
INJECTION, SOLUTION INTRAVENOUS CONTINUOUS
Status: CANCELLED | OUTPATIENT
Start: 2020-02-18

## 2020-02-18 RX ORDER — METHYLPREDNISOLONE SODIUM SUCCINATE 125 MG/2ML
125 INJECTION, POWDER, LYOPHILIZED, FOR SOLUTION INTRAMUSCULAR; INTRAVENOUS ONCE
Status: CANCELLED | OUTPATIENT
Start: 2020-02-18

## 2020-02-18 RX ORDER — SODIUM CHLORIDE 9 MG/ML
250 INJECTION, SOLUTION INTRAVENOUS CONTINUOUS
Status: DISCONTINUED | OUTPATIENT
Start: 2020-02-18 | End: 2020-02-19 | Stop reason: HOSPADM

## 2020-02-18 RX ORDER — HEPARIN SODIUM (PORCINE) LOCK FLUSH IV SOLN 100 UNIT/ML 100 UNIT/ML
500 SOLUTION INTRAVENOUS PRN
Status: CANCELLED | OUTPATIENT
Start: 2020-02-18

## 2020-02-18 RX ORDER — SODIUM CHLORIDE 9 MG/ML
250 INJECTION, SOLUTION INTRAVENOUS CONTINUOUS
Status: CANCELLED | OUTPATIENT
Start: 2020-02-18

## 2020-02-18 RX ORDER — EPINEPHRINE 1 MG/ML
0.3 INJECTION, SOLUTION, CONCENTRATE INTRAVENOUS PRN
Status: CANCELLED | OUTPATIENT
Start: 2020-02-18

## 2020-02-18 RX ADMIN — Medication 500 UNITS: at 12:44

## 2020-02-18 RX ADMIN — ATROPINE SULFATE 0.4 MG: 0.4 INJECTION, SOLUTION INTRAMUSCULAR; INTRAVENOUS; SUBCUTANEOUS at 10:43

## 2020-02-18 RX ADMIN — SODIUM CHLORIDE 250 ML: 9 INJECTION, SOLUTION INTRAVENOUS at 09:42

## 2020-02-18 RX ADMIN — SODIUM CHLORIDE 250 ML: 9 INJECTION, SOLUTION INTRAVENOUS at 12:22

## 2020-02-18 RX ADMIN — BEVACIZUMAB 400 MG: 400 INJECTION, SOLUTION INTRAVENOUS at 09:53

## 2020-02-18 RX ADMIN — DENOSUMAB 120 MG: 120 INJECTION SUBCUTANEOUS at 12:39

## 2020-02-18 RX ADMIN — Medication 10 ML: at 09:42

## 2020-02-18 RX ADMIN — Medication 10 ML: at 09:43

## 2020-02-18 RX ADMIN — IRINOTECAN HYDROCHLORIDE 400 MG: 20 INJECTION, SOLUTION INTRAVENOUS at 10:43

## 2020-02-18 RX ADMIN — PALONOSETRON 0.25 MG: 0.25 INJECTION, SOLUTION INTRAVENOUS at 09:41

## 2020-02-18 RX ADMIN — DEXAMETHASONE SODIUM PHOSPHATE 10 MG: 10 INJECTION INTRAMUSCULAR; INTRAVENOUS at 09:43

## 2020-02-18 RX ADMIN — LEUCOVORIN CALCIUM 850 MG: 10 INJECTION INTRAMUSCULAR; INTRAVENOUS at 10:43

## 2020-02-18 RX ADMIN — FLUOROURACIL 850 MG: 50 INJECTION, SOLUTION INTRAVENOUS at 12:22

## 2020-02-18 NOTE — PROGRESS NOTES
Laura Eric Winzer  2/18/2020  Wt Readings from Last 10 Encounters:   02/18/20 200 lb 1.6 oz (90.8 kg)   02/04/20 201 lb 6.4 oz (91.4 kg)   01/21/20 207 lb 8 oz (94.1 kg)   01/07/20 205 lb 12.8 oz (93.4 kg)   12/10/19 214 lb (97.1 kg)   12/03/19 210 lb 14.4 oz (95.7 kg)   11/12/19 207 lb 14.4 oz (94.3 kg)   10/29/19 212 lb 14.4 oz (96.6 kg)   10/15/19 207 lb 8 oz (94.1 kg)   09/17/19 203 lb 3.2 oz (92.2 kg)     Ht Readings from Last 1 Encounters:   02/18/20 6' 1\" (1.854 m)     Met with pt today for follow up. Pt reported that he has good periods and bad periods with both his appetite and BM's. Encouraged pt to follow BRAT diet and avoid high fat, acidic, spicy, or caffeine containing foods/beverages as well as raw fruits/vegetables when he he is having watery stools. Encouraged small frequent meals and not waiting for hunger cues to eat. Pt commented that he typically does this. He also stated that he has an air fryer and uses this for most of his meals to reduce the fat content. He added that he will on occasion when he is feeling well have small amounts of high fat foods, spicy foods, and acidic foods but that he tries to keep this to a minimum. He also mentioned that he does not care for water but does drink Gatorade and other flavored beverages. Encouraged pt to supplement fluid intake with Gatorade especially if he is having watery stools but to try to drink as close to water as he can tolerate in addition to this. Pt was receptive of information provided, will continue to follow PRN.      Rebeca March

## 2020-02-18 NOTE — PROGRESS NOTES
Patient presents to clinic today for Xgeva injection. Patient's labs monitored, specifically, Calcium level, to ensure no increased risk of hypocalcemia with administration of the medication. Patient's calcium level is 8.8 mg/dL. Patient received therapy and will continue to be monitored prior to each dose.     Amina Gray [place] 2/18/2020 12:33 PM

## 2020-02-18 NOTE — PROGRESS NOTES
Chemotherapy pump orders faxed to [facility] @ [phone] and call placed to [phone] and spoke with Anupam Ledbetter to confirm receipt. The pump will be administered in the home and de accessed in 46 hrs. Encouraged to call with questions/concerns.

## 2020-02-18 NOTE — PROGRESS NOTES
3488. Bone scan on 11/10/2015 noted no metastatic disease. CT chest on 11/10/2015 revealed Multiple pulmonary nodules in the upper and lower lobes consistent with metastatic disease;   Hepatic metastasis also visualized. 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 Mediport was placed. Cycle # 1 of FOLFOX + Avastin was on 11/23/2015. CEA was 4555 on 11/23/2015. Cycle # 2 of FOLFOX + Avastin was on 12/08/2015. CEA was 3160 on 12/08/2015. Cycle # 3 of FOLFOX + Avastin was on 12/22/2015. CEA was 2516 on 12/21/2015. Cycle # 4 of FOLFOX + Avastin was on 01/05/2016. CEA was 2098 on 01/05/2015. Cycle # 5 of FOLFOX + Avastin was on 01/19/2016. CEA was 1511 on 01/05/2015.     -Bone scan on 01/26/2016 noted no metastatic disease. CT chest on 01/26/2016 revealed Significant response to treatment with no visible residual nodules. CT scan abdomen/pelvis on 01/26/2016 noted Interval decreased size of multiple masses in the liver compatible with treatment response. Continue another 2 months of FOLFOX + Avastin and repeat scans. Cycle # 6 of FOLFOX + Avastin was on 02/02/2016.  on 02/02/2016. Cycle # 7 of FOLFOX + Avastin was on 02/16/2016.  on 02/16/2016. Cycle # 8 of FOLFOX + Avastin was on 03/01/2016.  on 03/01/2016. Cycle # 9 of FOLFOX + Avastin was on 03/15/2016.  on 03/15/2016. Cycle # 10 of FOLFOX + Avastin was on 03/29/2016. .4 on 03/29/2016. Cycle # 11 of FOLFOX + Avastin was on 04/12/2016. .4 on 04/12/2016.     Re-staging scans on 04/19/2016: CT Chest: clear lungs; no evidence of recurring pulmonary nodule; CT Abdomen/Pelvis: Further interval decrease in size of the multiple metastatic hepatic lesions, the largest lesion now measures 3.9 x 3.5 cm and previously measured 4.5 cm in maximum diameter. No mesenteric lymphadenopathy is identified; Bone Scan: No evidence of osseous metastasis.   Continue same regimen and re-stage in 2-3 months. Cycle # 12 FOLFOX + Avastin was on 04/26/2016. .5 on 04/26/2016. Cycle # 13 FOLFOX + Avastin was on 05/10/2016. .3 on 05/10/2016. Cycle # 14 FOLFOX+AVASTIN was on 05/24/2016. .5 on 05/24/2016. Cycle # 15 FOLFOX + Avastin was on 06/07/2016. CEA 90.5 on 06/07/2016. Admitted to [facility] 06/13/2016-06/16/2016 for abdominal pain: EGD noted 1.5 cm clean based duodenal bulb ulceration s/p epinephrine and bicap per Dr. Katerina Ramírez. No active bleeding. A. Stomach, biopsy: Mild chronic gastritis, immunostain negative for Helicobacter  B. Esophagus, biopsy: Gastric glandular mucosa with prominent intestinal metaplasia ([place]'s epithelium), negative for epithelial dysplasia, esophageal squamous mucosa not identified  Cycle # 16 FOLFOX (discontinued avastin (bevacizumab) given association of peptic ulcer disease and known association of GI perforation) was on 06/21/2016. Cycle # 17 FOLFOX was on 07/05/2016. CEA 52.6 on 07/19/2016. Cycle # 17 FOLFOX was on 07/05/2016. CEA 52.6 on 07/05/2016. CEA 47.6 on 07/19/2016.     Re-staging scans 07/19/2106: CT Chest negative for metastatic disease. CT Abdomen/Pelvis: Stable hepatic lesions; Question new mural thickening in the cecum. Bone Scan: New Let hip lesion suspicious for bone metastasis.     Increased CEA; new mural thickening in the cecum and new left hip lesion suspicious for bone metastasis are consistent with disease progression; He derived maximum benefit from FOLFOX/AVASTIN. D/C FOLFOX/AVASTIN. We recommended FOLFIRI second line therapy. Cycle # 1 FOLFIRI was on 08/02/2016. CEA 40.8 on 08/02/2016. Xgeva q4 weeks started on 08/02/2016. Cycle # 2 FOLFIRI was on 08/16/2016. CEA 31.7 on 08/16/2016. Cycle # 3 FOLFIRI (added Avastin) was on 08/30/2016 given that ulcers healed on EGD 08/15/2016 by Dr. Gemma Alfaro; Protonix bid since avastin re-started. Colonoscopy on 08/29/2016 (to look at the cecal area) unremarkable.  CEA 26.7 Avastin was on 07/24/2018. Cycle # 52 FOLFIRI + Avastin was on 08/14/2018. Cycle # 53 FOLFIRI + Avastin was on 08/28/2018. CT chest 09/06/2018 noted interval decrease in size of LUCY measuring up to 4 mm, suggestive of treatment response. No mediastinal or hilar LN  CT abdomen/pelvis 09/06/2018 Slight interval increase in size of a previously noted metastatic lesion within the right hepatic lobe. This may be related to differences in phase of enhancement compared to the most recent study from 5/31/2018, the lesion remains decreased in size compared to the more previous studies. No abdominal, retroperitoneal, or pelvic lymphadenopathy. Continue FOLFIRI + Avastin and repeat scans in 2 months. Cycle # 54 FOLFIRI + Avastin was on 09/11/2018. Cycle # 55 FOLFIRI + Avastin was on 09/25/2018. Cycle # 56 FOLFIRI + Avastin was on 10/09/2018. Cycle # 57 FOLFIRI + Avastin was on 10/23/2018. CT chest 11/02/2018 stable 3 mm nodule within LUCY. No new right and left lung nodule. No mediastinal or osseous lesion. CT abdomen/pelvis 11/02/2018 stable metastatic disease to liver. No new metastatic disease identified. No mesenteric or retroperitoneal LN. No gross colonic lesion identified. Continue FOLFIRI + Avastin and repeat scans in 3 months. Cycle # 58 FOLFIRI + Avastin was on 11/06/2018. CEA 7.6 on 11/05/2018. Cycle # 59 FOLFIRI + Avastin was on 11/27/2018. CEA 6.9 on 11/26/2018. Cycle # 60 FOLFIRI + Avastin was on 12/11/2018. CEA 6.7 on 12/11/2018. Cycle # 61 FOLFIRI + Avastin was on 01/08/2019. CEA 5.6 on 01/07/2019. Cycle # 62 FOLFIRI + Avastin was on 01/29/2019. CEA 4.6 on 01/28/2019. Cycle # 63 FOLFIRI + Avastin was on 02/12/2019. CEA 4.3 on 02/11/2019. CT chest 02/21/2019 no evidence of metastatic disease. CT abdomen/pelvis 02/21/2019 stable hypodense liver lesions. No evidence of worsening metastatic disease. 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  Ordered abdomen/pelvis 12/19/2019 Previously described small hypodense lesions are more conspicuous on the current study compared to the recent study throughout the liver from 8/20/2019, however similar to the prior study from 2/21/2019. Findings may be related to differences in contrast opacification. No abdominal or pelvic lymphadenopathy. Continue FOLFIRI + Avastin and repeat scans in 2 months to f/u on liver lesions. Cycle # 81 FOLFIRI + Avastin was on 01/07/2020. CEA 3.8 on 01/06/2020. Cycle # 82 FOLFIRI + Avastin was on 01/21/2020. CEA 3.7 on 01/20/2020. Cycle # 83 FOLFIRI + Avastin was on 02/04/2020. CEA 4.1 on 02/03/2020. Review of Systems;  CONSTITUTIONAL: No fever, chills. Fair appetite and energy level. ENMT: Eyes: recent eye surgery; Nose: No epistaxis. Mouth: No sore throat  RESPIRATORY: No hemoptysis, shortness of breath. CARDIOVASCULAR: No chest pain, palpitations. GASTROINTESTINAL: No nausea/vomiting, abdominal pain. Intermittent diarrhea  GENITOURINARY: No dysuria, urinary frequency, hematuria. NEURO: No syncope, presyncope, headache. Remainder ROS: Negative. Past Medical History:   Past Medical History               Diagnosis Date    Arthritis      Cancer ([facility] [address]         colon    Depression      Hyperlipidemia      Hypertension           Medications:  Reviewed and reconciled.     Allergies: Allergies   Allergen Reactions    Neosporin [Neomycin-Polymyxin-Gramicidin] Rash    Tape [place] Tape] Rash      Physical Exam:  /60 (Site: Left Upper Arm, Position: Sitting, Cuff Size: Medium Adult)   Pulse 72   Temp 98 °F (36.7 °C) (Temporal)   Ht 6' 1\" (1.854 m)   Wt 200 lb 1.6 oz (90.8 kg)   BMI 26.40 kg/m²   GENERAL: Alert, oriented x 3, not in acute distress  HEENT: PERRLA, EOMI. No oral lesions. NECK: Supple. Without lymphadenopathy. LUNGS: Lungs are CTA bilaterally, with no wheezing, crackles or rhonchi. CARDIOVASCULAR: Regular rhythm.  No murmurs, rubs or 03/15/2016. Cycle # 10 of FOLFOX + Avastin was on 03/29/2016. .4 on 03/29/2016. Cycle # 11 of FOLFOX + Avastin was on 04/12/2016. .4 on 04/12/2016.     Re-staging scans on 04/19/2016: CT Chest: clear lungs; no evidence of recurring pulmonary nodule; CT Abdomen/Pelvis: Further interval decrease in size of the multiple metastatic hepatic lesions, the largest lesion now measures 3.9 x 3.5 cm and previously measured 4.5 cm in maximum diameter. No mesenteric lymphadenopathy is identified; Bone Scan: No evidence of osseous metastasis. Continue same regimen and re-stage in 2-3 months. Cycle # 12 FOLFOX + Avastin was on 04/26/2016. .5 on 04/26/2016. Cycle # 13 FOLFOX + Avastin was on 05/10/2016. .3 on 05/10/2016. Cycle # 14 FOLFOX+AVASTIN was on 05/24/2016. .5 on 05/24/2016. Cycle # 15 FOLFOX + Avastin was on 06/07/2016. CEA 90.5 on 06/07/2016. Admitted to [facility] 06/13/2016-06/16/2016 for abdominal pain: EGD noted 1.5 cm clean based duodenal bulb ulceration s/p epinephrine and bicap per Dr. Tenzin Bauer. No active bleeding. A. Stomach, biopsy: Mild chronic gastritis, immunostain negative for Helicobacter  B. Esophagus, biopsy: Gastric glandular mucosa with prominent ntestinal metaplasia ([place]'s epithelium), negative for epithelial dysplasia, esophageal squamous mucosa not identified     Cycle # 16 FOLFOX (discontinued avastin (bevacizumab) given association of peptic ulcer disease and known association of GI perforation.) was on 06/21/2016. CEA 47 on 06/21/2016. Cycle # 17 FOLFOX was on 07/05/2016. CEA 52.6 on 07/05/2016. CEA 47.6 on 07/19/2016.     Re-staging scans 07/19/2106: CT Chest negative for metastatic disease. CT Abdomen/Pelvis: Stable hepatic lesions; Question new mural thickening in the cecum. Bone Scan: New Let hip lesion suspicious for bone metastasis.        Increased CEA; new mural thickening in the cecum and new left hip lesion suspicious for bone metastasis are CEA 6.6 on 06/27/2017. Re-staging scans 07/05/2017:  Bone scan stable proximal left femoral diaphysis, right acetabulum, and left anterior sixth rib lesions;  CT abdomen/pelvis stable hypodense metastatic lesions within the liver; CT chest without convincing evidence of metastatic disease. Cycle # 25 FOLFIRI + Avastin was on 07/11/2017. CEA 6.3 on 07/11/2017. Cycle # 26 FOLFIRI + Avastin was on 07/25/2017. CEA 7.3 on 07/25/2017. Cycle # 27 FOLFIRI + Avastin was on 08/08/2017. CEA 6.5 on 08/08/2017. Cycle # 28 FOLFIRI + Avastin was on 08/22/2017. CEA 5.9 on 08/22/2017. Cycle # 29 FOLFIRI + Avastin was on 09/05/2017. CEA 6.3 on 09/05/2017. Cycle # 30 FOLFIRI + Avastin was on 09/19/2017. CEA 6.3 on 09/19/2017. Bone scan 09/26/2017 stable. CT abdomen/pelvis on 09/26/2017 Stable hypodense mass in the liver. Stable sclerotic lesion in the acetabulum. CT chest 09/26/2017 negative for metastatic disease. Cycle # 31 FOLFIRI + Avastin was on 10/03/2017. CEA 7.4 on 10/03/2017. Cycle # 32 FOLFIRI + Avastin was on 10/17/2017. CEA 6.0 on 10/17/2017. Cycle # 33 FOLFIRI + Avastin was on 10/31/2017. CEA 6.8 on 10/31/2017. Cycle # 34 FOLFIRI + Avastin was on 11/14/2017. CEA 7.2 on 11/14/2017. Cycle # 35 FOLFIRI + Avastin was on 11/28/2017. CEA 5.1 on 11/28/2017. Cycle # 36 FOLFIRI + Avastin was on 12/12/2017. CEA 6.1 on 12/12/2017. Bone scan 12/22/2017 noted no evidence of metastatic disease to the axial or appendicular skeleton. CT chest 12/22/2017 noted no evidence of metastatic disease. CT abdomen/pelvis 12/22/2017 noted stable hypodense lesions in the liver. Continue FOLFIRI + Avastin and repeat scans in 3 months. Cycle # 37 FOLFIRI + Avastin was on 12/27/2018. CEA 6.7 on 12/27/2017. Cycle # 38 FOLFIRI + Avastin was on 01/09/2018. CEA 5.0 on 01/09/2018. Cycle # 39 FOLFIRI + Avastin was on 01/23/2018. CEA 6.5 on 01/23/2018. Cycle # 40 FOLFIRI + Avastin was on 02/06/2018.  CEA 6.9 on 02/06/2018. Cycle # 41 FOLFIRI + Avastin was on 02/20/2018. CEA 6.4 on 02/20/2018. Cycle # 42 FOLFIRI + Avastin was on 03/06/2018. CEA 6.6 on 03/06/2018. Cycle # 43 FOLFIRI + Avastin was on 03/20/2018. CEA 5.7 on 03/20/2018. Bone scan on 03/26/2018 negative for metastatic disease. CT chest 03/26/2018 noted ? new 7 mm nodule in LUCY. CT abdomen/pelvis 03/26/2018 noted stable hypodense lesions in the liver. ? New small ascites in the abdomen. Patient wants to continue to monitor the lung finding and repeat scans in 2 months instead of changing the current chemotherapy regimen to oral Lonsurf. Cycle # 44 FOLFIRI + Avastin was on 04/03/2018. CEA 6.3 on 04/03/2018. Cycle # 45 FOLFIRI + Avastin was on 04/24/2018. CEA 5.3 on 04/24/2018. Cycle # 46 FOLFIRI + Avastin was on 05/08/2018. CEA 5.4 on 05/08/2018. Cycle # 47 FOLFIRI + Avastin was on 05/22/2018. CEA 6.1 on 05/22/2018. CT chest 05/31/2018 noted stable nodule in LUCY. No evidence of worsening malignancy. CT abdomen/pelvis on 05/31/2018 noted stable liver metastasis. No evidence of worsening malignancy. Continue FOLFIRI + Avastin and repeat scans in 3 months. Cycle # 48 FOLFIRI + Avastin was on 06/06/2018. CEA 6.3 on 06/05/2018. Cycle # 49 FOLFIRI + Avastin was on 06/19/2018. CEA 6.1 on 06/19/2018. Cycle # 50 FOLFIRI + Avastin was on 07/03/2018. CEA 7.4 on 07/03/2018. Cycle # 51 FOLFIRI + Avastin was on 07/24/2018. CEA 6.7 on 07/24/2018. Cycle # 52 FOLFIRI + Avastin was on 08/14/2018. CEA 6.8 on 08/14/2018. Cycle # 53 FOLFIRI + Avastin was on 08/28/2018. CEA 6.5 on 08/27/2018. CT chest 09/06/2018 noted interval decrease in size of LUCY measuring up to 4 mm, suggestive of treatment response. No mediastinal or hilar LN  CT abdomen/pelvis 09/06/2018 Slight interval increase in size of a previously noted metastatic lesion within the right hepatic lobe.  This may be related to differences in phase of enhancement compared to the most recent study from 5/31/2018, the lesion remains decreased in size compared to the more previous studies. No abdominal, retroperitoneal, or pelvic lymphadenopathy. Continue FOLFIRI + Avastin and repeat scans in 2 months. Cycle # 54 FOLFIRI + Avastin was on 09/11/2018. CEA 6.4 on 09/10/2018. Cycle # 55 FOLFIRI + Avastin was on 09/25/2018. CEA 6.4 on 09/25/2018. Cycle # 56 FOLFIRI + Avastin was on 10/09/2018. CEA 6.7 on 10/08/2018. Cycle # 57 FOLFIRI + Avastin was on 10/23/2018. CEA 7.2 on 10/22/2018. CT chest 11/02/2018 stable 3 mm nodule within LUCY. No new right and left lung nodule. No mediastinal or osseous lesion. CT abdomen/pelvis 11/02/2018 stable metastatic disease to liver. No new metastatic disease identified. No mesenteric or retroperitoneal LN. No gross colonic lesion identified. Continue FOLFIRI + Avastin and repeat scans in 3 months. Cycle # 58 FOLFIRI + Avastin was on 11/06/2018. CEA 7.6 on 11/05/2018. Cycle # 59 FOLFIRI + Avastin was on 11/27/2018. CEA 6.9 on 11/26/2018. Cycle # 60 FOLFIRI + Avastin was on 12/11/2018. CEA 6.7 on 12/11/2018. Cycle # 61 FOLFIRI + Avastin was on 01/08/2019. CEA 5.6 on 01/07/2019. Cycle # 62 FOLFIRI + Avastin was on 01/29/2019. CEA 4.6 on 01/28/2019. Cycle # 63 FOLFIRI + Avastin was on 02/12/2019. CEA 4.3 on 02/11/2019. CT chest 02/21/2019 no evidence of metastatic disease. CT abdomen/pelvis 02/21/2019 stable hypodense liver lesions. No evidence of worsening metastatic disease. 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 Ordered MRI thoracic spine and referred to neurosurgery team.  Cycle # 64 FOLFIRI + Avastin was on 02/26/2019. CEA 4.7 on 02/25/2019. Cycle # 65 FOLFIRI + Avastin was on 03/12/2019. CEA 4.0 on 03/11/2019. Cycle # 66 FOLFIRI + Avastin was on 03/26/2019. CEA 4.7 on 03/25/2019. Cycle # 67 FOLFIRI + Avastin was on 04/09/2019. CEA 5.6 on 04/08/2019. Cycle # 68 FOLFIRI + Avastin was on 04/30/2019.  CEA 4.9 on 04/29/2019. Cycle # 69 FOLFIRI + Avastin was on 05/14/2019. CEA 5.3 on 05/14/2019. CT chest 05/23/2019 stable small lung nodule with no evidence of metastatic disease. CT abdomen/pelvis 05/23/2019 Decrease conspicuity of the liver lesions. No new lesions seen. Stable splenomegaly. Continue FOLFIRI + Avastin and repeat scans in 3 months. Cycle # 70 FOLFIRI + Avastin was on 06/04/2019. CEA 4.8 on 06/03/2019. Cycle # 71 FOLFIRI + Avastin was on 06/18/2019. CEA 4.6 on 06/17/2019. Cycle # 72 FOLFIRI + Avastin was on 07/09/2019. CEA 4.3 on 07/08/2019. Cycle # 73 FOLFIRI + Avastin was on 07/30/2019. CEA 5.0 on 07/29/2019. Cycle # 74 FOLFIRI + Avastin was on 08/13/2019. CEA 5.0 on 08/12/2019. CT chest 08/20/2019 negative  CT abdomen/pelvis 08/20/2019 Previous identified hepatic lesions are not visualized on the current exam.  Continue FOLFIRI + Avastin and repeat scans in 3 months. Cycle # 75 FOLFIRI + Avastin was on 08/27/2019. CEA 5.0 on 08/26/2019. Cycle # 76 FOLFIRI + Avastin was on 09/17/2019. CEA 5.5 on 09/16/2019. Cycle # 77 FOLFIRI + Avastin was on 10/15/2019. CEA 4.6 on 10/15/2019. Cycle # 78 FOLFIRI + Avastin was on 10/29/2019. CEA 4.1 on 10/28/2019. Cycle # 79 FOLFIRI + Avastin was on 11/12/2019. CEA 4.3 on 11/12/2019. Cycle # 80 FOLFIRI + Avastin was on 12/10/2019. CEA 4.0 on 12/09/2019. CT chest 12/19/2019 Stable 3 mm nodule within the left upper lobe, similar to the previous studies dating back to 11/2/2018, however decreased in size compared to the CT from 3/26/2018. No thoracic LN. CT abdomen/pelvis 12/19/2019 Previously described small hypodense lesions are more conspicuous on the current study compared to the recent study throughout the liver from 8/20/2019, however similar to the prior study from 2/21/2019. Findings may be related to differences in contrast opacification. No abdominal or pelvic lymphadenopathy.    Continue FOLFIRI + Avastin and repeat scans in 2 months to f/u on liver lesions. Cycle # 81 FOLFIRI + Avastin was on 01/07/2020. CEA 3.8 on 01/06/2020. Cycle # 82 FOLFIRI + Avastin was on 01/21/2020. CEA 3.7 on 01/20/2020. Cycle # 83 FOLFIRI + Avastin was on 02/04/2020. CEA 4.1 on 02/03/2020. Cycle # 84 FOLFIRI + Avastin is today 02/18/2020. CEA 4.6 on 02/17/2020.     RTC in 2 weeks with prior scans and for Cycle # 85 FOLFIRI + Avastin  MSI testing noted no mismatch repair protein loss of expression    2/18/2020  Kraig Tinsley MD  Board Certified Medical Oncologist

## 2020-02-20 ENCOUNTER — HOSPITAL ENCOUNTER (OUTPATIENT)
Dept: INFUSION THERAPY | Age: 70
Discharge: HOME | End: 2020-02-20
Payer: MEDICARE

## 2020-02-20 DIAGNOSIS — C20 RECTAL CANCER METASTASIZED TO LIVER (HCC): ICD-10-CM

## 2020-02-20 DIAGNOSIS — C78.7 RECTAL CANCER METASTASIZED TO LIVER (HCC): ICD-10-CM

## 2020-02-20 DIAGNOSIS — Z51.11 ENCOUNTER FOR ANTINEOPLASTIC CHEMOTHERAPY: Primary | ICD-10-CM

## 2020-02-20 PROCEDURE — 6360000002 HC RX W HCPCS: Performed by: INTERNAL MEDICINE

## 2020-02-20 PROCEDURE — 96372 THER/PROPH/DIAG INJ SC/IM: CPT

## 2020-02-20 RX ADMIN — PEGFILGRASTIM 6 MG: 6 INJECTION SUBCUTANEOUS at 12:57

## 2020-02-28 ENCOUNTER — HOSPITAL ENCOUNTER (OUTPATIENT)
Dept: CT IMAGING | Age: 70
Discharge: HOME | End: 2020-02-28
Payer: MEDICARE

## 2020-02-28 PROCEDURE — 6360000004 HC RX CONTRAST MEDICATION: Performed by: RADIOLOGY

## 2020-02-28 PROCEDURE — 71260 CT THORAX DX C+: CPT

## 2020-02-28 PROCEDURE — 74177 CT ABD & PELVIS W/CONTRAST: CPT

## 2020-02-28 RX ADMIN — IOHEXOL 50 ML: 240 INJECTION, SOLUTION INTRATHECAL; INTRAVASCULAR; INTRAVENOUS; ORAL at 08:22

## 2020-02-28 RX ADMIN — IOPAMIDOL 80 ML: 755 INJECTION, SOLUTION INTRAVENOUS at 08:23

## 2020-03-02 ENCOUNTER — TELEPHONE (OUTPATIENT)
Dept: INFUSION THERAPY | Age: 70
End: 2020-03-02

## 2020-03-02 ENCOUNTER — HOSPITAL ENCOUNTER (OUTPATIENT)
Age: 70
Discharge: HOME | End: 2020-03-02
Payer: MEDICARE

## 2020-03-02 ENCOUNTER — TELEPHONE (OUTPATIENT)
Dept: ONCOLOGY | Age: 70
End: 2020-03-02

## 2020-03-02 LAB
ALBUMIN SERPL-MCNC: 3.6 G/DL (ref 3.5–5.2)
ALP BLD-CCNC: 173 U/L (ref 40–129)
ALT SERPL-CCNC: 17 U/L (ref 0–40)
ANION GAP SERPL CALCULATED.3IONS-SCNC: 14 MMOL/L (ref 7–16)
ANISOCYTOSIS: ABNORMAL
AST SERPL-CCNC: 23 U/L (ref 0–39)
BASOPHILS ABSOLUTE: 0.03 E9/L (ref 0–0.2)
BASOPHILS RELATIVE PERCENT: 0.9 % (ref 0–2)
BILIRUB SERPL-MCNC: 0.8 MG/DL (ref 0–1.2)
BUN BLDV-MCNC: 12 MG/DL (ref 8–23)
CALCIUM SERPL-MCNC: 8.6 MG/DL (ref 8.6–10.2)
CEA: 7.4 NG/ML (ref 0–5.2)
CHLORIDE BLD-SCNC: 104 MMOL/L (ref 98–107)
CO2: 24 MMOL/L (ref 22–29)
CREAT SERPL-MCNC: 1.3 MG/DL (ref 0.7–1.2)
EOSINOPHILS ABSOLUTE: 0.11 E9/L (ref 0.05–0.5)
EOSINOPHILS RELATIVE PERCENT: 3.5 % (ref 0–6)
GFR AFRICAN AMERICAN: >60
GFR NON-AFRICAN AMERICAN: 54 ML/MIN/1.73
GLUCOSE BLD-MCNC: 110 MG/DL (ref 74–99)
HCT VFR BLD CALC: 26.3 % (ref 37–54)
HEMOGLOBIN: 8.2 G/DL (ref 12.5–16.5)
HYPOCHROMIA: ABNORMAL
LYMPHOCYTES ABSOLUTE: 0.43 E9/L (ref 1.5–4)
LYMPHOCYTES RELATIVE PERCENT: 13.9 % (ref 20–42)
MCH RBC QN AUTO: 30.6 PG (ref 26–35)
MCHC RBC AUTO-ENTMCNC: 31.2 % (ref 32–34.5)
MCV RBC AUTO: 98.1 FL (ref 80–99.9)
METAMYELOCYTES RELATIVE PERCENT: 1.7 % (ref 0–1)
MONOCYTES ABSOLUTE: 0.12 E9/L (ref 0.1–0.95)
MONOCYTES RELATIVE PERCENT: 4.3 % (ref 2–12)
MYELOCYTE PERCENT: 2.6 % (ref 0–0)
NEUTROPHILS ABSOLUTE: 2.39 E9/L (ref 1.8–7.3)
NEUTROPHILS RELATIVE PERCENT: 73 % (ref 43–80)
OVALOCYTES: ABNORMAL
PDW BLD-RTO: 19.3 FL (ref 11.5–15)
PLATELET # BLD: 123 E9/L (ref 130–450)
PMV BLD AUTO: 10.6 FL (ref 7–12)
POIKILOCYTES: ABNORMAL
POLYCHROMASIA: ABNORMAL
POTASSIUM SERPL-SCNC: 3.8 MMOL/L (ref 3.5–5)
RBC # BLD: 2.68 E12/L (ref 3.8–5.8)
SODIUM BLD-SCNC: 142 MMOL/L (ref 132–146)
TEAR DROP CELLS: ABNORMAL
TOTAL PROTEIN: 6.7 G/DL (ref 6.4–8.3)
WBC # BLD: 3.1 E9/L (ref 4.5–11.5)

## 2020-03-02 PROCEDURE — 80053 COMPREHEN METABOLIC PANEL: CPT

## 2020-03-02 PROCEDURE — 85025 COMPLETE CBC W/AUTO DIFF WBC: CPT

## 2020-03-02 PROCEDURE — 82378 CARCINOEMBRYONIC ANTIGEN: CPT

## 2020-03-02 PROCEDURE — 36415 COLL VENOUS BLD VENIPUNCTURE: CPT

## 2020-03-03 ENCOUNTER — HOSPITAL ENCOUNTER (OUTPATIENT)
Dept: INFUSION THERAPY | Age: 70
Discharge: HOME | End: 2020-03-03
Payer: MEDICARE

## 2020-03-03 ENCOUNTER — OFFICE VISIT (OUTPATIENT)
Dept: ONCOLOGY | Age: 70
End: 2020-03-03
Payer: MEDICARE

## 2020-03-03 VITALS
HEART RATE: 73 BPM | DIASTOLIC BLOOD PRESSURE: 67 MMHG | WEIGHT: 198.1 LBS | HEIGHT: 73 IN | OXYGEN SATURATION: 98 % | SYSTOLIC BLOOD PRESSURE: 120 MMHG | TEMPERATURE: 96.7 F | BODY MASS INDEX: 26.26 KG/M2

## 2020-03-03 VITALS — TEMPERATURE: 97.4 F | SYSTOLIC BLOOD PRESSURE: 107 MMHG | HEART RATE: 60 BPM | DIASTOLIC BLOOD PRESSURE: 68 MMHG

## 2020-03-03 DIAGNOSIS — C20 RECTAL CANCER METASTASIZED TO LIVER (HCC): Primary | ICD-10-CM

## 2020-03-03 DIAGNOSIS — C78.7 RECTAL CANCER METASTASIZED TO LIVER (HCC): Primary | ICD-10-CM

## 2020-03-03 PROCEDURE — 99214 OFFICE O/P EST MOD 30 MIN: CPT | Performed by: INTERNAL MEDICINE

## 2020-03-03 PROCEDURE — 2580000003 HC RX 258: Performed by: INTERNAL MEDICINE

## 2020-03-03 PROCEDURE — 96368 THER/DIAG CONCURRENT INF: CPT

## 2020-03-03 PROCEDURE — 96375 TX/PRO/DX INJ NEW DRUG ADDON: CPT

## 2020-03-03 PROCEDURE — 6360000002 HC RX W HCPCS: Performed by: INTERNAL MEDICINE

## 2020-03-03 PROCEDURE — 96415 CHEMO IV INFUSION ADDL HR: CPT

## 2020-03-03 PROCEDURE — 96417 CHEMO IV INFUS EACH ADDL SEQ: CPT

## 2020-03-03 PROCEDURE — 96411 CHEMO IV PUSH ADDL DRUG: CPT

## 2020-03-03 PROCEDURE — 96413 CHEMO IV INFUSION 1 HR: CPT

## 2020-03-03 RX ORDER — SODIUM CHLORIDE 9 MG/ML
250 INJECTION, SOLUTION INTRAVENOUS CONTINUOUS
Status: DISCONTINUED | OUTPATIENT
Start: 2020-03-03 | End: 2020-03-04 | Stop reason: HOSPADM

## 2020-03-03 RX ORDER — DEXAMETHASONE SODIUM PHOSPHATE 100 MG/10ML
10 INJECTION INTRAMUSCULAR; INTRAVENOUS ONCE
Status: COMPLETED | OUTPATIENT
Start: 2020-03-03 | End: 2020-03-03

## 2020-03-03 RX ORDER — FLUOROURACIL 50 MG/ML
850 INJECTION, SOLUTION INTRAVENOUS ONCE
Status: COMPLETED | OUTPATIENT
Start: 2020-03-03 | End: 2020-03-03

## 2020-03-03 RX ORDER — SODIUM CHLORIDE 0.9 % (FLUSH) 0.9 %
10 SYRINGE (ML) INJECTION PRN
Status: DISCONTINUED | OUTPATIENT
Start: 2020-03-03 | End: 2020-03-04 | Stop reason: HOSPADM

## 2020-03-03 RX ORDER — 0.9 % SODIUM CHLORIDE 0.9 %
10 VIAL (ML) INJECTION ONCE
Status: CANCELLED | OUTPATIENT
Start: 2020-03-03

## 2020-03-03 RX ORDER — PALONOSETRON HYDROCHLORIDE 0.05 MG/ML
0.25 INJECTION, SOLUTION INTRAVENOUS ONCE
Status: CANCELLED | OUTPATIENT
Start: 2020-03-03

## 2020-03-03 RX ORDER — DEXAMETHASONE SODIUM PHOSPHATE 10 MG/ML
10 INJECTION, SOLUTION INTRAMUSCULAR; INTRAVENOUS ONCE
Status: CANCELLED | OUTPATIENT
Start: 2020-03-03

## 2020-03-03 RX ORDER — SODIUM CHLORIDE 0.9 % (FLUSH) 0.9 %
10 SYRINGE (ML) INJECTION PRN
Status: CANCELLED | OUTPATIENT
Start: 2020-03-03

## 2020-03-03 RX ORDER — ATROPINE SULFATE 0.4 MG/ML
0.4 AMPUL (ML) INJECTION ONCE
Status: COMPLETED | OUTPATIENT
Start: 2020-03-03 | End: 2020-03-03

## 2020-03-03 RX ORDER — METHYLPREDNISOLONE SODIUM SUCCINATE 125 MG/2ML
125 INJECTION, POWDER, LYOPHILIZED, FOR SOLUTION INTRAMUSCULAR; INTRAVENOUS ONCE
Status: CANCELLED | OUTPATIENT
Start: 2020-03-03

## 2020-03-03 RX ORDER — OMEPRAZOLE 20 MG/1
20 CAPSULE, DELAYED RELEASE ORAL DAILY
Qty: 30 CAPSULE | Refills: 0 | Status: SHIPPED
Start: 2020-03-03 | End: 2020-03-03

## 2020-03-03 RX ORDER — FLUOROURACIL 50 MG/ML
850 INJECTION, SOLUTION INTRAVENOUS ONCE
Status: CANCELLED | OUTPATIENT
Start: 2020-03-03

## 2020-03-03 RX ORDER — SODIUM CHLORIDE 9 MG/ML
INJECTION, SOLUTION INTRAVENOUS CONTINUOUS
Status: CANCELLED | OUTPATIENT
Start: 2020-03-03

## 2020-03-03 RX ORDER — HEPARIN SODIUM (PORCINE) LOCK FLUSH IV SOLN 100 UNIT/ML 100 UNIT/ML
500 SOLUTION INTRAVENOUS PRN
Status: DISCONTINUED | OUTPATIENT
Start: 2020-03-03 | End: 2020-03-04 | Stop reason: HOSPADM

## 2020-03-03 RX ORDER — ATROPINE SULFATE 0.4 MG/ML
0.4 AMPUL (ML) INJECTION ONCE
Status: CANCELLED | OUTPATIENT
Start: 2020-03-03

## 2020-03-03 RX ORDER — SODIUM CHLORIDE 9 MG/ML
250 INJECTION, SOLUTION INTRAVENOUS CONTINUOUS
Status: CANCELLED | OUTPATIENT
Start: 2020-03-03

## 2020-03-03 RX ORDER — DIPHENHYDRAMINE HYDROCHLORIDE 50 MG/ML
50 INJECTION INTRAMUSCULAR; INTRAVENOUS ONCE
Status: CANCELLED | OUTPATIENT
Start: 2020-03-03

## 2020-03-03 RX ORDER — HEPARIN SODIUM (PORCINE) LOCK FLUSH IV SOLN 100 UNIT/ML 100 UNIT/ML
500 SOLUTION INTRAVENOUS PRN
Status: CANCELLED | OUTPATIENT
Start: 2020-03-03

## 2020-03-03 RX ORDER — PALONOSETRON HYDROCHLORIDE 0.05 MG/ML
0.25 INJECTION, SOLUTION INTRAVENOUS ONCE
Status: COMPLETED | OUTPATIENT
Start: 2020-03-03 | End: 2020-03-03

## 2020-03-03 RX ORDER — EPINEPHRINE 1 MG/ML
0.3 INJECTION, SOLUTION, CONCENTRATE INTRAVENOUS PRN
Status: CANCELLED | OUTPATIENT
Start: 2020-03-03

## 2020-03-03 RX ADMIN — FLUOROURACIL 850 MG: 50 INJECTION, SOLUTION INTRAVENOUS at 12:05

## 2020-03-03 RX ADMIN — PALONOSETRON 0.25 MG: 0.25 INJECTION, SOLUTION INTRAVENOUS at 09:26

## 2020-03-03 RX ADMIN — SODIUM CHLORIDE 250 ML: 9 INJECTION, SOLUTION INTRAVENOUS at 08:59

## 2020-03-03 RX ADMIN — BEVACIZUMAB 400 MG: 400 INJECTION, SOLUTION INTRAVENOUS at 09:46

## 2020-03-03 RX ADMIN — IRINOTECAN HYDROCHLORIDE 400 MG: 20 INJECTION, SOLUTION INTRAVENOUS at 10:27

## 2020-03-03 RX ADMIN — LEUCOVORIN CALCIUM 850 MG: 10 INJECTION INTRAMUSCULAR; INTRAVENOUS at 10:27

## 2020-03-03 RX ADMIN — Medication 500 UNITS: at 12:02

## 2020-03-03 RX ADMIN — ATROPINE SULFATE 0.4 MG: 0.4 INJECTION, SOLUTION INTRAMUSCULAR; INTRAVENOUS; SUBCUTANEOUS at 10:21

## 2020-03-03 RX ADMIN — DEXAMETHASONE SODIUM PHOSPHATE 10 MG: 10 INJECTION, SOLUTION INTRAMUSCULAR; INTRAVENOUS at 09:28

## 2020-03-03 RX ADMIN — SODIUM CHLORIDE, PRESERVATIVE FREE 10 ML: 5 INJECTION INTRAVENOUS at 12:02

## 2020-03-03 NOTE — PROGRESS NOTES
3488. Bone scan on 11/10/2015 noted no metastatic disease. CT chest on 11/10/2015 revealed Multiple pulmonary nodules in the upper and lower lobes consistent with metastatic disease;   Hepatic metastasis also visualized. 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 Mediport was placed. Cycle # 1 of FOLFOX + Avastin was on 11/23/2015. CEA was 4555 on 11/23/2015. Cycle # 2 of FOLFOX + Avastin was on 12/08/2015. CEA was 3160 on 12/08/2015. Cycle # 3 of FOLFOX + Avastin was on 12/22/2015. CEA was 2516 on 12/21/2015. Cycle # 4 of FOLFOX + Avastin was on 01/05/2016. CEA was 2098 on 01/05/2015. Cycle # 5 of FOLFOX + Avastin was on 01/19/2016. CEA was 1511 on 01/05/2015.     -Bone scan on 01/26/2016 noted no metastatic disease. CT chest on 01/26/2016 revealed Significant response to treatment with no visible residual nodules. CT scan abdomen/pelvis on 01/26/2016 noted Interval decreased size of multiple masses in the liver compatible with treatment response. Continue another 2 months of FOLFOX + Avastin and repeat scans. Cycle # 6 of FOLFOX + Avastin was on 02/02/2016.  on 02/02/2016. Cycle # 7 of FOLFOX + Avastin was on 02/16/2016.  on 02/16/2016. Cycle # 8 of FOLFOX + Avastin was on 03/01/2016.  on 03/01/2016. Cycle # 9 of FOLFOX + Avastin was on 03/15/2016.  on 03/15/2016. Cycle # 10 of FOLFOX + Avastin was on 03/29/2016. .4 on 03/29/2016. Cycle # 11 of FOLFOX + Avastin was on 04/12/2016. .4 on 04/12/2016.     Re-staging scans on 04/19/2016: CT Chest: clear lungs; no evidence of recurring pulmonary nodule; CT Abdomen/Pelvis: Further interval decrease in size of the multiple metastatic hepatic lesions, the largest lesion now measures 3.9 x 3.5 cm and previously measured 4.5 cm in maximum diameter. No mesenteric lymphadenopathy is identified; Bone Scan: No evidence of osseous metastasis.   Continue same regimen and and repeat scans in 3 months. Cycle # 13 FOLFIRI + Avastin was on 01/24/2017. Cycle # 14 FOLFIRI + Avastin was on 02/07/2017. Cycle # 15 FOLFIRI + Avastin was on 02/21/2017. Cycle # 16 FOLFIRI + Avastin was on 03/07/2017. Cycle # 17 FOLFIRI + Avastin was on 03/21/2017. Cycle # 18 FOLFIRI + Avastin was on 04/04/2017. CT chest 04/17/2017 negative for metastatic disease. CT abdomen/pelvis 04/17/2017 Stable hypodense metastatic lesions within the liver. Stable area of increased sclerosis along the right acetabulum  Bone scan 04/17/2017 Stable subtle uptake within the left proximal femoral diaphysis; No definite abnormal uptake in the region of a sclerotic lesion along the posterior column of the right acetabulum noted on CT. Stable slight uptake within the left anterior sixth rib corresponding to linear sclerosis on the recent CT, favored to be related to a fracture. Continue FOLFIRI + Avastin and repeat scans in 3 months. Cycle # 19 FOLFIRI + Avastin was on 04/18/2017. Cycle # 20 FOLFIRI + Avastin was on 05/02/2017. Cycle # 21 FOLFIRI + Avastin was on 05/16/2017. Cycle # 22 FOLFIRI + Avastin was on 05/30/2017. Cycle # 23 FOLFIRI + Avastin was on 06/13/2017. Cycle # 24 FOLFIRI + Avastin was on 06/27/2017. Cycle # 25 FOLFIRI + Avastin was on 07/11/2017. Cycle # 26 FOLFIRI + Avastin was on 07/25/2017. Cycle # 27 FOLFIRI + Avastin was on 08/08/2017. Cycle # 28 FOLFIRI + Avastin was on 08/22/2017. Cycle # 29 FOLFIRI + Avastin was on 09/05/2017. Cycle # 30 FOLFIRI + Avastin was on 09/19/2017. Bone scan 09/26/2017 stable. CT abdomen/pelvis on 09/26/2017 Stable hypodense mass in the liver. Stable sclerotic lesion in the acetabulum. CT chest 09/26/2017 negative for metastatic disease. Cycle # 31 FOLFIRI + Avastin was on 10/03/2017. CEA 7.4 on 10/03/2017. Cycle # 32 FOLFIRI + Avastin was on 10/17/2017. CEA 6.0 on 10/17/2017. Cycle # 33 FOLFIRI + Avastin was on 10/31/2017.  CEA 6.8 on 10/31/2017. Cycle # 34 FOLFIRI + Avastin was on 11/14/2017. CEA 7.2 on 11/14/2017. Cycle # 35 FOLFIRI + Avastin was on 11/28/2017. CEA 5.1 on 11/28/2017. Cycle # 36 FOLFIRI + Avastin was on 12/12/2017. CEA 6.1 on 12/12/2017. Bone scan 12/22/2017 noted no evidence of metastatic disease to the axial or appendicular skeleton. CT chest 12/22/2017 noted no evidence of metastatic disease. CT abdomen/pelvis 12/22/2017 noted stable hypodense lesions in the liver. Continue FOLFIRI + Avastin and repeat scans in 3 months. Cycle # 37 FOLFIRI + Avastin was on 12/27/2018. Cycle # 38 FOLFIRI + Avastin was on 01/09/2018. Cycle # 39 FOLFIRI + Avastin was on 01/23/2018. Cycle # 40 FOLFIRI + Avastin was on 02/06/2018. Cycle # 41 FOLFIRI + Avastin was on 02/20/2018. Cycle # 42 FOLFIRI + Avastin was on 03/06/2018. Cycle # 43 FOLFIRI + Avastin was on 03/20/2018. Bone scan on 03/26/2018 negative for metastatic disease. CT chest 03/26/2018 noted ? new 7 mm nodule in LUCY. CT abdomen/pelvis 03/26/2018 noted stable hypodense lesions in the liver. ? New small ascites in the abdomen. Patient wants to continue to monitor the lung finding and repeat scans in 2 months instead of changing the current regimen into oral Lonsurf. Cycle # 44 FOLFIRI + Avastin was on 04/03/2018. CEA 6.3 on 04/03/2018. Cycle # 45 FOLFIRI + Avastin was on 04/24/2018. CEA 5.3 on 04/24/2018. Cycle # 46 FOLFIRI + Avastin was on 05/08/2018. CEA 5.4 on 05/08/2018. Cycle # 47 FOLFIRI + Avastin was on 05/22/2018. CEA 6.1 on 05/22/2018. CT chest 05/31/2018 noted stable nodule in LUCY. No evidence of worsening malignancy. CT abdomen/pelvis on 05/31/2018 noted stable liver metastasis. No evidence of worsening malignancy. Continue FOLFIRI + Avastin and repeat scans in 3 months. Cycle # 48 FOLFIRI + Avastin was on 06/06/2018. Cycle # 49 FOLFIRI + Avastin was on 06/19/2018. Cycle # 50 FOLFIRI + Avastin was on 07/03/2018.   Cycle # 51 FOLFIRI + Wt 198 lb 1.6 oz (89.9 kg)   SpO2 98%   BMI 26.14 kg/m²   GENERAL: Alert, oriented x 3, not in acute distress  HEENT: PERRLA, EOMI. No oral lesions. NECK: Supple. Without lymphadenopathy. LUNGS: Lungs are CTA bilaterally, with no wheezing, crackles or rhonchi. CARDIOVASCULAR: Regular rhythm. No murmurs, rubs or gallops. ABDOMEN: Soft. Non-tender, non-distended. Positive bowel sounds. EXTREMITIES: Without clubbing, cyanosis, or edema. NEUROLOGIC: No focal deficits. ECOG PS 1    Diagnostics:  Lab Results   Component Value Date    WBC 3.1 (L) 03/02/2020    HGB 8.2 (L) 03/02/2020    HCT 26.3 (L) 03/02/2020    MCV 98.1 03/02/2020     (L) 03/02/2020     Lab Results   Component Value Date     03/02/2020    K 3.8 03/02/2020     03/02/2020    CO2 24 03/02/2020    BUN 12 03/02/2020    CREATININE 1.3 (H) 03/02/2020    GLUCOSE 110 (H) 03/02/2020    CALCIUM 8.6 03/02/2020    PROT 6.7 03/02/2020    LABALBU 3.6 03/02/2020    BILITOT 0.8 03/02/2020    ALKPHOS 173 (H) 03/02/2020    AST 23 03/02/2020    ALT 17 03/02/2020    LABGLOM 54 03/02/2020    GFRAA >60 03/02/2020     Lab Results   Component Value Date    CEA 7.4 (H) 03/02/2020      Impression/Plan:  79 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 KRAS Mutation: Mutation detected. BRAF Mutation: Mutation not detected. 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 CT scan abdomen/pelvis on 10/26/2015 revealed small nodules at the lung bases, extensive liver lesions consistent with metastatic rectosigmoid cancer. CEA 3488 on 10/30/2015. Bone scan on 11/10/2015 noted no metastatic disease. CT chest on 11/10/2015 revealed Multiple pulmonary nodules in the upper and lower lobes consistent with metastatic disease; Hepatic metastasis also visualized. 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 Mediport was placed.   Cycle # 1 of FOLFOX + Avastin was on 11/23/2015. CEA was 4555 on 11/23/2015. Cycle # 2 of FOLFOX + Avastin was on 12/08/2015. CEA was 3160 on 12/08/2015. Cycle # 3 of FOLFOX + Avastin was on 12/22/2015. CEA was 2516 on 12/21/2015. Cycle # 4 of FOLFOX + Avastin was on 01/05/2016. CEA was 2098 on 01/05/2015. Cycle # 5 of FOLFOX + Avastin was on 01/19/2016. CEA was 1511 on 01/05/2015.     -Bone scan on 01/26/2016 noted no metastatic disease. CT chest on 01/26/2016 revealed Significant response to treatment with no visible residual nodules. CT scan abdomen/pelvis on 01/26/2016 noted Interval decreased size of multiple masses in the liver compatible with treatment response. Continue another 2 months of FOLFOX + Avastin and repeat scans. Cycle # 6 of FOLFOX + Avastin was on 02/02/2016.  on 02/02/2016. Cycle # 7 of FOLFOX + Avastin was on 02/16/2016.  on 02/16/2016. Cycle # 8 of FOLFOX + Avastin was on 03/01/2016.  on 03/01/2016. Cycle # 9 of FOLFOX + Avastin was on 03/15/2016.  on 03/15/2016. Cycle # 10 of FOLFOX + Avastin was on 03/29/2016. .4 on 03/29/2016. Cycle # 11 of FOLFOX + Avastin was on 04/12/2016. .4 on 04/12/2016.     Re-staging scans on 04/19/2016: CT Chest: clear lungs; no evidence of recurring pulmonary nodule; CT Abdomen/Pelvis: Further interval decrease in size of the multiple metastatic hepatic lesions, the largest lesion now measures 3.9 x 3.5 cm and previously measured 4.5 cm in maximum diameter. No mesenteric lymphadenopathy is identified; Bone Scan: No evidence of osseous metastasis. Continue same regimen and re-stage in 2-3 months. Cycle # 12 FOLFOX + Avastin was on 04/26/2016. .5 on 04/26/2016. Cycle # 13 FOLFOX + Avastin was on 05/10/2016. .3 on 05/10/2016. Cycle # 14 FOLFOX+AVASTIN was on 05/24/2016. .5 on 05/24/2016. Cycle # 15 FOLFOX + Avastin was on 06/07/2016. CEA 90.5 on 06/07/2016.    Admitted to [facility] 06/13/2016-06/16/2016 for abdominal pain: EGD noted 1.5 cm clean based duodenal bulb ulceration s/p epinephrine and bicap per Dr. Starla Mishra. No active bleeding. A. Stomach, biopsy: Mild chronic gastritis, immunostain negative for Helicobacter  B. Esophagus, biopsy: Gastric glandular mucosa with prominent ntestinal metaplasia ([place]'s epithelium), negative for epithelial dysplasia, esophageal squamous mucosa not identified     Cycle # 16 FOLFOX (discontinued avastin (bevacizumab) given association of peptic ulcer disease and known association of GI perforation.) was on 06/21/2016. CEA 47 on 06/21/2016. Cycle # 17 FOLFOX was on 07/05/2016. CEA 52.6 on 07/05/2016. CEA 47.6 on 07/19/2016.     Re-staging scans 07/19/2106: CT Chest negative for metastatic disease. CT Abdomen/Pelvis: Stable hepatic lesions; Question new mural thickening in the cecum. Bone Scan: New Let hip lesion suspicious for bone metastasis.     Increased CEA; new mural thickening in the cecum and new left hip lesion suspicious for bone metastasis are consistent with disease progression; He derived maximum benefit from FOLFOX/AVASTIN. D/C FOLFOX/AVASTIN. We recommended FOLFIRI second line therapy. Cycle # 1 FOLFIRI was on 08/02/2016. CEA 40.8 on 08/02/2016. Xgeva q4 weeks started on 08/02/2016. Cycle # 2 FOLFIRI was on 08/16/2016. CEA 31.7 on 08/16/2016. Cycle # 3 FOLFIRI (added Avastin) was on 08/30/2016 given that ulcers healed on EGD 08/15/2016 by Dr. Parley Cheadle; Protonix bid since avastin re-started. Colonoscopy on 08/29/2016 (to look at the cecal area) unremarkable. CEA 26.7 on 08/30/2016. Cycle # 4 FOLFIRI/Avastin was on 09/13/2016. CEA 23.4 on 09/13/2016. Cycle # 5 FOLFIRI/Avastin was on 09/27/2016. CEA 22.3 on 09/27/2016. Cycle # 6 FOLFIRI/Avastin was on 10/11/2016. CEA 18.4 on 10/11/2016.      Bone scan 10/21/2016 stable bone metastasis; ? New lesion anterior left sixth rib likely interval healing fracture. CT abdomen/pelvis revealed stable hepatic metastasis.  CT chest 08/26/2019. Cycle # 76 FOLFIRI + Avastin was on 09/17/2019. CEA 5.5 on 09/16/2019. Cycle # 77 FOLFIRI + Avastin was on 10/15/2019. CEA 4.6 on 10/15/2019. Cycle # 78 FOLFIRI + Avastin was on 10/29/2019. CEA 4.1 on 10/28/2019. Cycle # 79 FOLFIRI + Avastin was on 11/12/2019. CEA 4.3 on 11/12/2019. Cycle # 80 FOLFIRI + Avastin was on 12/10/2019. CEA 4.0 on 12/09/2019. CT chest 12/19/2019 Stable 3 mm nodule within the left upper lobe, similar to the previous studies dating back to 11/2/2018, however decreased in size compared to the CT from 3/26/2018. No thoracic LN. CT abdomen/pelvis 12/19/2019 Previously described small hypodense lesions are more conspicuous on the current study compared to the recent study throughout the liver from 8/20/2019, however similar to the prior study from 2/21/2019. Findings may be related to differences in contrast opacification. No abdominal or pelvic lymphadenopathy. Continue FOLFIRI + Avastin and repeat scans in 2 months to f/u on liver lesions. Cycle # 81 FOLFIRI + Avastin was on 01/07/2020. CEA 3.8 on 01/06/2020. Cycle # 82 FOLFIRI + Avastin was on 01/21/2020. CEA 3.7 on 01/20/2020. Cycle # 83 FOLFIRI + Avastin was on 02/04/2020. CEA 4.1 on 02/03/2020. Cycle # 84 FOLFIRI + Avastin was on 02/18/2020. CEA 4.6 on 02/17/2020. CT chest 2/28/2020 no evidence of metastatic disease to the lungs and no mediastinal or hilar adenopathy. CT abdomen pelvis 2/28/2020 no enhancing lesions seen within the liver to suggest metastatic disease. Wall thickening of the rectosigmoid junction. Images reviewed. Continue FOLFIRI Avastin and repeat scans in 3 months  EGD by Dr. Carrillo First 03/02/2020 showing no masses or lesions (records requested). Cycle # 85 FOLFIRI + Avastin is today 03/03/2020. CEA 7.4 on 03/02/2020. Labs reviewed, ok to proceed with chemo today. Recommended Imodium as needed for intermittent diarrhea.   Elevated creatinine 1.3 encourage him to increase p.o. fluid intake; dry skin recommended skin moisturizer such as Eucerin    RTC 2 weeks for Cycle # 86 FOLFIRI + Avastin  MSI testing noted no mismatch repair protein loss of expression    3/3/2020  Karl Winn MD  Board Certified Medical Oncologist

## 2020-03-05 ENCOUNTER — HOSPITAL ENCOUNTER (OUTPATIENT)
Dept: INFUSION THERAPY | Age: 70
Discharge: HOME | End: 2020-03-05
Payer: MEDICARE

## 2020-03-05 ENCOUNTER — APPOINTMENT (OUTPATIENT)
Dept: GENERAL RADIOLOGY | Age: 70
End: 2020-03-05
Payer: MEDICARE

## 2020-03-05 ENCOUNTER — HOSPITAL ENCOUNTER (EMERGENCY)
Age: 70
Discharge: HOME | End: 2020-03-05
Payer: MEDICARE

## 2020-03-05 VITALS
WEIGHT: 198 LBS | HEART RATE: 71 BPM | DIASTOLIC BLOOD PRESSURE: 86 MMHG | HEIGHT: 73 IN | TEMPERATURE: 97.4 F | BODY MASS INDEX: 26.24 KG/M2 | SYSTOLIC BLOOD PRESSURE: 139 MMHG | RESPIRATION RATE: 20 BRPM | OXYGEN SATURATION: 98 %

## 2020-03-05 PROCEDURE — 96372 THER/PROPH/DIAG INJ SC/IM: CPT

## 2020-03-05 PROCEDURE — 6360000002 HC RX W HCPCS: Performed by: PHYSICIAN ASSISTANT

## 2020-03-05 PROCEDURE — 6360000002 HC RX W HCPCS: Performed by: INTERNAL MEDICINE

## 2020-03-05 PROCEDURE — 90471 IMMUNIZATION ADMIN: CPT | Performed by: PHYSICIAN ASSISTANT

## 2020-03-05 PROCEDURE — 99283 EMERGENCY DEPT VISIT LOW MDM: CPT

## 2020-03-05 PROCEDURE — 90715 TDAP VACCINE 7 YRS/> IM: CPT | Performed by: PHYSICIAN ASSISTANT

## 2020-03-05 PROCEDURE — 73630 X-RAY EXAM OF FOOT: CPT

## 2020-03-05 RX ORDER — CEPHALEXIN 500 MG/1
500 CAPSULE ORAL 4 TIMES DAILY
Qty: 40 CAPSULE | Refills: 0 | Status: SHIPPED | OUTPATIENT
Start: 2020-03-05 | End: 2020-03-15

## 2020-03-05 RX ORDER — HEPARIN SODIUM (PORCINE) LOCK FLUSH IV SOLN 100 UNIT/ML 100 UNIT/ML
500 SOLUTION INTRAVENOUS PRN
Status: CANCELLED | OUTPATIENT
Start: 2020-03-05

## 2020-03-05 RX ORDER — METRONIDAZOLE 500 MG/1
500 TABLET ORAL 3 TIMES DAILY
COMMUNITY
End: 2020-05-15 | Stop reason: DRUGHIGH

## 2020-03-05 RX ORDER — SODIUM CHLORIDE 0.9 % (FLUSH) 0.9 %
10 SYRINGE (ML) INJECTION PRN
Status: CANCELLED | OUTPATIENT
Start: 2020-03-05

## 2020-03-05 RX ORDER — DIPHENOXYLATE HYDROCHLORIDE AND ATROPINE SULFATE 2.5; .025 MG/1; MG/1
1 TABLET ORAL 4 TIMES DAILY PRN
COMMUNITY

## 2020-03-05 RX ORDER — HEPARIN SODIUM (PORCINE) LOCK FLUSH IV SOLN 100 UNIT/ML 100 UNIT/ML
SOLUTION INTRAVENOUS
Status: DISCONTINUED
Start: 2020-03-05 | End: 2020-03-06 | Stop reason: HOSPADM

## 2020-03-05 RX ORDER — POLYETHYLENE GLYCOL 3350 17 G/17G
17 POWDER, FOR SOLUTION ORAL DAILY PRN
COMMUNITY

## 2020-03-05 RX ADMIN — PEGFILGRASTIM 6 MG: 6 INJECTION SUBCUTANEOUS at 14:34

## 2020-03-05 RX ADMIN — TETANUS TOXOID, REDUCED DIPHTHERIA TOXOID AND ACELLULAR PERTUSSIS VACCINE, ADSORBED 0.5 ML: 5; 2.5; 8; 8; 2.5 SUSPENSION INTRAMUSCULAR at 16:36

## 2020-03-05 ASSESSMENT — PAIN DESCRIPTION - PAIN TYPE: TYPE: CHRONIC PAIN

## 2020-03-05 ASSESSMENT — PAIN DESCRIPTION - ORIENTATION: ORIENTATION: LOWER

## 2020-03-05 ASSESSMENT — PAIN DESCRIPTION - LOCATION: LOCATION: BACK

## 2020-03-05 NOTE — ED PROVIDER NOTES
[facility]       Department of Emergency Medicine   ED  Provider Note  Admit Date/RoomTime: 3/5/2020  3:15 PM  ED Room: 24/24  MRN: [id number]  Chief Complaint: Toe Pain (wound on great toe, right sided that pt reports is black) and Other (skin tear on left wrist, and pt asks that we check both feet)       History of Present Illness   Source of history provided by:  patient. History/Exam Limitations: none. Jennifer Ludwig is a 78 y.o. male who has a past medical history of:   Past Medical History:   Diagnosis Date    Arthritis     Cancer ([facility] [address]     colon    Depression     Hyperlipidemia     Hypertension     presents to the ED by private car and is alone for a wound to the right great toe. Patient states that he believes he dropped something on his foot, unsure what it was about a month ago. He states that there is a small red mervat. He states that he has had socks on his feet for a few days and has not looked at his toes. He states that he took his sock off today noticed that it was black in color. He reports pain to the great toe that is ongoing. He also reports a skin tear to the left wrist.  He states that there was an adhesive on the wrist and states that when it was taken off it pulled his skin off a few days ago. He has not had a tetanus shot in the past 5 years. Nothing make his symptoms better or worse. ROS    Pertinent positives and negatives are stated within HPI, all other systems reviewed and are negative. Past Surgical History:   Procedure Laterality Date    COLONOSCOPY      COLONOSCOPY  11/2/15    bx, rectal mass    FRACTURE SURGERY      KNEE ARTHROSCOPY Right     LIVER BIOPSY  11/12/15    SKIN FULL GRAFT      on finger    TONSILLECTOMY Bilateral    Social History:  reports that he quit smoking about 19 years ago. His smoking use included cigarettes. He has a 40.00 pack-year smoking history.  He has never used smokeless tobacco. He reports that he does not drink alcohol no focal deficits, symmetric strength 5/5 in the upper and lower extremities bilaterally  · Psychiatric: Normal Affect    Lab / Imaging Results   (All laboratory and radiology results have been personally reviewed by myself)  Labs:  No results found for this visit on 03/05/20. Imaging: All Radiology results interpreted by Radiologist unless otherwise noted. XR FOOT RIGHT (MIN 3 VIEWS)   Final Result   No acute finding          ED Course / Medical Decision Making     Medications   Tetanus-Diphth-Acell Pertussis (BOOSTRIX) injection 0.5 mL (0.5 mLs Intramuscular Given 3/5/20 1636)        Re-examination:  3/5/20       Time: 1700   Updated on results. Consult(s):   None    Procedure(s):   none    MDM:   Patient has no acute findings seen on x-ray today. Tetanus updated here today. Patient has good peripheral pulses, no evidence for vascular compromise. There is a superficial abrasion without any laceration to the affected toe. There is some mild surrounding cellulitis, will discharge home on Keflex. Patient is advised return to the ER for any worsening symptoms. Otherwise to follow-up with PCP. Counseling: The emergency provider has spoken with the patient and discussed todays results, in addition to providing specific details for the plan of care and counseling regarding the diagnosis and prognosis. Questions are answered at this time and they are agreeable with the plan. Assessment      1. Abrasion of toe of right foot, initial encounter    2. Infected abrasion of left wrist, initial encounter    3.  Cellulitis, unspecified cellulitis site      Plan   Discharge to home  Patient condition is good    New Medications     Discharge Medication List as of 3/5/2020  5:06 PM      START taking these medications    Details   cephALEXin (KEFLEX) 500 MG capsule Take 1 capsule by mouth 4 times daily for 10 days, Disp-40 capsule, R-0Print           Electronically signed by ELICEO Aguilar   DD: 3/5/20  **This report was transcribed using voice recognition software. Every effort was made to ensure accuracy; however, inadvertent computerized transcription errors may be present.   END OF ED PROVIDER NOTE       Patricia Davis  03/05/20 [id number]

## 2020-03-16 ENCOUNTER — HOSPITAL ENCOUNTER (OUTPATIENT)
Age: 70
Discharge: HOME | End: 2020-03-16
Payer: MEDICARE

## 2020-03-16 LAB
ALBUMIN SERPL-MCNC: 3.6 G/DL (ref 3.5–5.2)
ALP BLD-CCNC: 134 U/L (ref 40–129)
ALT SERPL-CCNC: 12 U/L (ref 0–40)
ANION GAP SERPL CALCULATED.3IONS-SCNC: 14 MMOL/L (ref 7–16)
ANISOCYTOSIS: ABNORMAL
AST SERPL-CCNC: 22 U/L (ref 0–39)
BASOPHILS ABSOLUTE: 0.02 E9/L (ref 0–0.2)
BASOPHILS RELATIVE PERCENT: 1 % (ref 0–2)
BILIRUB SERPL-MCNC: 0.8 MG/DL (ref 0–1.2)
BUN BLDV-MCNC: 9 MG/DL (ref 8–23)
CALCIUM SERPL-MCNC: 9 MG/DL (ref 8.6–10.2)
CEA: 4.5 NG/ML (ref 0–5.2)
CHLORIDE BLD-SCNC: 102 MMOL/L (ref 98–107)
CO2: 22 MMOL/L (ref 22–29)
CREAT SERPL-MCNC: 1.1 MG/DL (ref 0.7–1.2)
EOSINOPHILS ABSOLUTE: 0.06 E9/L (ref 0.05–0.5)
EOSINOPHILS RELATIVE PERCENT: 4 % (ref 0–6)
GFR AFRICAN AMERICAN: >60
GFR NON-AFRICAN AMERICAN: >60 ML/MIN/1.73
GLUCOSE BLD-MCNC: 143 MG/DL (ref 74–99)
HCT VFR BLD CALC: 25 % (ref 37–54)
HEMOGLOBIN: 7.8 G/DL (ref 12.5–16.5)
HYPOCHROMIA: ABNORMAL
LYMPHOCYTES ABSOLUTE: 0.34 E9/L (ref 1.5–4)
LYMPHOCYTES RELATIVE PERCENT: 21 % (ref 20–42)
MCH RBC QN AUTO: 31.1 PG (ref 26–35)
MCHC RBC AUTO-ENTMCNC: 31.2 % (ref 32–34.5)
MCV RBC AUTO: 99.6 FL (ref 80–99.9)
MONOCYTES ABSOLUTE: 0.13 E9/L (ref 0.1–0.95)
MONOCYTES RELATIVE PERCENT: 8 % (ref 2–12)
NEUTROPHILS ABSOLUTE: 1.06 E9/L (ref 1.8–7.3)
NEUTROPHILS RELATIVE PERCENT: 66 % (ref 43–80)
OVALOCYTES: ABNORMAL
PDW BLD-RTO: 20.6 FL (ref 11.5–15)
PLATELET # BLD: 110 E9/L (ref 130–450)
PMV BLD AUTO: 11.6 FL (ref 7–12)
POIKILOCYTES: ABNORMAL
POLYCHROMASIA: ABNORMAL
POTASSIUM SERPL-SCNC: 3.7 MMOL/L (ref 3.5–5)
RBC # BLD: 2.51 E12/L (ref 3.8–5.8)
SODIUM BLD-SCNC: 138 MMOL/L (ref 132–146)
TEAR DROP CELLS: ABNORMAL
TOTAL PROTEIN: 6.5 G/DL (ref 6.4–8.3)
WBC # BLD: 1.6 E9/L (ref 4.5–11.5)

## 2020-03-16 PROCEDURE — 82378 CARCINOEMBRYONIC ANTIGEN: CPT

## 2020-03-16 PROCEDURE — 85025 COMPLETE CBC W/AUTO DIFF WBC: CPT

## 2020-03-16 PROCEDURE — 36415 COLL VENOUS BLD VENIPUNCTURE: CPT

## 2020-03-16 PROCEDURE — 80053 COMPREHEN METABOLIC PANEL: CPT

## 2020-03-17 ENCOUNTER — HOSPITAL ENCOUNTER (OUTPATIENT)
Dept: INFUSION THERAPY | Age: 70
Discharge: HOME | End: 2020-03-17
Payer: MEDICARE

## 2020-03-17 ENCOUNTER — OFFICE VISIT (OUTPATIENT)
Dept: ONCOLOGY | Age: 70
End: 2020-03-17
Payer: MEDICARE

## 2020-03-17 VITALS — TEMPERATURE: 97.4 F | DIASTOLIC BLOOD PRESSURE: 67 MMHG | HEART RATE: 66 BPM | SYSTOLIC BLOOD PRESSURE: 120 MMHG

## 2020-03-17 VITALS
SYSTOLIC BLOOD PRESSURE: 107 MMHG | HEART RATE: 70 BPM | WEIGHT: 198.9 LBS | HEIGHT: 73 IN | BODY MASS INDEX: 26.36 KG/M2 | TEMPERATURE: 97.2 F | DIASTOLIC BLOOD PRESSURE: 68 MMHG | OXYGEN SATURATION: 100 %

## 2020-03-17 DIAGNOSIS — C20 RECTAL CANCER METASTASIZED TO LIVER (HCC): Primary | ICD-10-CM

## 2020-03-17 DIAGNOSIS — C79.51 BONE METASTASIS (HCC): ICD-10-CM

## 2020-03-17 DIAGNOSIS — C78.7 RECTAL CANCER METASTASIZED TO LIVER (HCC): Primary | ICD-10-CM

## 2020-03-17 PROCEDURE — 99214 OFFICE O/P EST MOD 30 MIN: CPT | Performed by: INTERNAL MEDICINE

## 2020-03-17 PROCEDURE — 96368 THER/DIAG CONCURRENT INF: CPT

## 2020-03-17 PROCEDURE — 6360000002 HC RX W HCPCS: Performed by: INTERNAL MEDICINE

## 2020-03-17 PROCEDURE — 96366 THER/PROPH/DIAG IV INF ADDON: CPT

## 2020-03-17 PROCEDURE — 96411 CHEMO IV PUSH ADDL DRUG: CPT

## 2020-03-17 PROCEDURE — 96413 CHEMO IV INFUSION 1 HR: CPT

## 2020-03-17 PROCEDURE — 96415 CHEMO IV INFUSION ADDL HR: CPT

## 2020-03-17 PROCEDURE — 2580000003 HC RX 258: Performed by: INTERNAL MEDICINE

## 2020-03-17 PROCEDURE — 96409 CHEMO IV PUSH SNGL DRUG: CPT

## 2020-03-17 PROCEDURE — 96372 THER/PROPH/DIAG INJ SC/IM: CPT

## 2020-03-17 PROCEDURE — 96375 TX/PRO/DX INJ NEW DRUG ADDON: CPT

## 2020-03-17 PROCEDURE — 96417 CHEMO IV INFUS EACH ADDL SEQ: CPT

## 2020-03-17 RX ORDER — ATROPINE SULFATE 0.4 MG/ML
0.4 AMPUL (ML) INJECTION ONCE
Status: COMPLETED | OUTPATIENT
Start: 2020-03-17 | End: 2020-03-17

## 2020-03-17 RX ORDER — HEPARIN SODIUM (PORCINE) LOCK FLUSH IV SOLN 100 UNIT/ML 100 UNIT/ML
500 SOLUTION INTRAVENOUS PRN
Status: DISCONTINUED | OUTPATIENT
Start: 2020-03-17 | End: 2020-03-18 | Stop reason: HOSPADM

## 2020-03-17 RX ORDER — PALONOSETRON HYDROCHLORIDE 0.05 MG/ML
0.25 INJECTION, SOLUTION INTRAVENOUS ONCE
Status: COMPLETED | OUTPATIENT
Start: 2020-03-17 | End: 2020-03-17

## 2020-03-17 RX ORDER — ATROPINE SULFATE 0.4 MG/ML
0.4 AMPUL (ML) INJECTION ONCE
Status: CANCELLED | OUTPATIENT
Start: 2020-03-17

## 2020-03-17 RX ORDER — SODIUM CHLORIDE 9 MG/ML
INJECTION, SOLUTION INTRAVENOUS CONTINUOUS
Status: CANCELLED | OUTPATIENT
Start: 2020-03-17

## 2020-03-17 RX ORDER — SODIUM CHLORIDE 0.9 % (FLUSH) 0.9 %
10 SYRINGE (ML) INJECTION PRN
Status: DISCONTINUED | OUTPATIENT
Start: 2020-03-17 | End: 2020-03-18 | Stop reason: HOSPADM

## 2020-03-17 RX ORDER — SODIUM CHLORIDE 0.9 % (FLUSH) 0.9 %
10 SYRINGE (ML) INJECTION PRN
Status: CANCELLED | OUTPATIENT
Start: 2020-03-17

## 2020-03-17 RX ORDER — FLUOROURACIL 50 MG/ML
850 INJECTION, SOLUTION INTRAVENOUS ONCE
Status: CANCELLED | OUTPATIENT
Start: 2020-03-17

## 2020-03-17 RX ORDER — DIPHENHYDRAMINE HYDROCHLORIDE 50 MG/ML
50 INJECTION INTRAMUSCULAR; INTRAVENOUS ONCE
Status: CANCELLED | OUTPATIENT
Start: 2020-03-17

## 2020-03-17 RX ORDER — 0.9 % SODIUM CHLORIDE 0.9 %
10 VIAL (ML) INJECTION ONCE
Status: CANCELLED | OUTPATIENT
Start: 2020-03-17

## 2020-03-17 RX ORDER — FLUOROURACIL 50 MG/ML
850 INJECTION, SOLUTION INTRAVENOUS ONCE
Status: COMPLETED | OUTPATIENT
Start: 2020-03-17 | End: 2020-03-17

## 2020-03-17 RX ORDER — PALONOSETRON HYDROCHLORIDE 0.05 MG/ML
0.25 INJECTION, SOLUTION INTRAVENOUS ONCE
Status: CANCELLED | OUTPATIENT
Start: 2020-03-17

## 2020-03-17 RX ORDER — DEXAMETHASONE SODIUM PHOSPHATE 10 MG/ML
10 INJECTION, SOLUTION INTRAMUSCULAR; INTRAVENOUS ONCE
Status: CANCELLED | OUTPATIENT
Start: 2020-03-17

## 2020-03-17 RX ORDER — DEXAMETHASONE SODIUM PHOSPHATE 100 MG/10ML
10 INJECTION INTRAMUSCULAR; INTRAVENOUS ONCE
Status: COMPLETED | OUTPATIENT
Start: 2020-03-17 | End: 2020-03-17

## 2020-03-17 RX ORDER — SODIUM CHLORIDE 9 MG/ML
250 INJECTION, SOLUTION INTRAVENOUS CONTINUOUS
Status: DISCONTINUED | OUTPATIENT
Start: 2020-03-17 | End: 2020-03-18 | Stop reason: HOSPADM

## 2020-03-17 RX ORDER — SODIUM CHLORIDE 9 MG/ML
250 INJECTION, SOLUTION INTRAVENOUS CONTINUOUS
Status: CANCELLED | OUTPATIENT
Start: 2020-03-17

## 2020-03-17 RX ORDER — METHYLPREDNISOLONE SODIUM SUCCINATE 125 MG/2ML
125 INJECTION, POWDER, LYOPHILIZED, FOR SOLUTION INTRAMUSCULAR; INTRAVENOUS ONCE
Status: CANCELLED | OUTPATIENT
Start: 2020-03-17

## 2020-03-17 RX ORDER — HEPARIN SODIUM (PORCINE) LOCK FLUSH IV SOLN 100 UNIT/ML 100 UNIT/ML
500 SOLUTION INTRAVENOUS PRN
Status: CANCELLED | OUTPATIENT
Start: 2020-03-17

## 2020-03-17 RX ORDER — EPINEPHRINE 1 MG/ML
0.3 INJECTION, SOLUTION, CONCENTRATE INTRAVENOUS PRN
Status: CANCELLED | OUTPATIENT
Start: 2020-03-17

## 2020-03-17 RX ADMIN — ATROPINE SULFATE 0.4 MG: 0.4 INJECTION, SOLUTION INTRAMUSCULAR; INTRAVENOUS; SUBCUTANEOUS at 09:59

## 2020-03-17 RX ADMIN — LEUCOVORIN CALCIUM 850 MG: 10 INJECTION INTRAMUSCULAR; INTRAVENOUS at 10:42

## 2020-03-17 RX ADMIN — SODIUM CHLORIDE 250 ML: 9 INJECTION, SOLUTION INTRAVENOUS at 09:22

## 2020-03-17 RX ADMIN — DEXAMETHASONE SODIUM PHOSPHATE 10 MG: 10 INJECTION, SOLUTION INTRAMUSCULAR; INTRAVENOUS at 09:29

## 2020-03-17 RX ADMIN — DENOSUMAB 120 MG: 120 INJECTION SUBCUTANEOUS at 09:29

## 2020-03-17 RX ADMIN — Medication 500 UNITS: at 12:50

## 2020-03-17 RX ADMIN — SODIUM CHLORIDE, PRESERVATIVE FREE 10 ML: 5 INJECTION INTRAVENOUS at 12:50

## 2020-03-17 RX ADMIN — FLUOROURACIL 850 MG: 50 INJECTION, SOLUTION INTRAVENOUS at 12:43

## 2020-03-17 RX ADMIN — BEVACIZUMAB 400 MG: 400 INJECTION, SOLUTION INTRAVENOUS at 10:05

## 2020-03-17 RX ADMIN — PALONOSETRON 0.25 MG: 0.25 INJECTION, SOLUTION INTRAVENOUS at 09:26

## 2020-03-17 RX ADMIN — SODIUM CHLORIDE 400 MG: 9 INJECTION, SOLUTION INTRAVENOUS at 10:42

## 2020-03-17 NOTE — PROGRESS NOTES
re-stage in 2-3 months. Cycle # 12 FOLFOX + Avastin was on 04/26/2016. .5 on 04/26/2016. Cycle # 13 FOLFOX + Avastin was on 05/10/2016. .3 on 05/10/2016. Cycle # 14 FOLFOX+AVASTIN was on 05/24/2016. .5 on 05/24/2016. Cycle # 15 FOLFOX + Avastin was on 06/07/2016. CEA 90.5 on 06/07/2016. Admitted to [facility] 06/13/2016-06/16/2016 for abdominal pain: EGD noted 1.5 cm clean based duodenal bulb ulceration s/p epinephrine and bicap per Dr. Miles Aguero. No active bleeding. A. Stomach, biopsy: Mild chronic gastritis, immunostain negative for Helicobacter  B. Esophagus, biopsy: Gastric glandular mucosa with prominent intestinal metaplasia ([place]'s epithelium), negative for epithelial dysplasia, esophageal squamous mucosa not identified  Cycle # 16 FOLFOX (discontinued avastin (bevacizumab) given association of peptic ulcer disease and known association of GI perforation) was on 06/21/2016. Cycle # 17 FOLFOX was on 07/05/2016. CEA 52.6 on 07/19/2016. Cycle # 17 FOLFOX was on 07/05/2016. CEA 52.6 on 07/05/2016. CEA 47.6 on 07/19/2016.     Re-staging scans 07/19/2106: CT Chest negative for metastatic disease. CT Abdomen/Pelvis: Stable hepatic lesions; Question new mural thickening in the cecum. Bone Scan: New Let hip lesion suspicious for bone metastasis.     Increased CEA; new mural thickening in the cecum and new left hip lesion suspicious for bone metastasis are consistent with disease progression; He derived maximum benefit from FOLFOX/AVASTIN. D/C FOLFOX/AVASTIN. We recommended FOLFIRI second line therapy. Cycle # 1 FOLFIRI was on 08/02/2016. CEA 40.8 on 08/02/2016. Xgeva q4 weeks started on 08/02/2016. Cycle # 2 FOLFIRI was on 08/16/2016. CEA 31.7 on 08/16/2016. Cycle # 3 FOLFIRI (added Avastin) was on 08/30/2016 given that ulcers healed on EGD 08/15/2016 by Dr. Tish Stewart; Protonix bid since avastin re-started. Colonoscopy on 08/29/2016 (to look at the cecal area) unremarkable.  CEA 26.7 and repeat scans in 3 months. Cycle # 13 FOLFIRI + Avastin was on 01/24/2017. Cycle # 14 FOLFIRI + Avastin was on 02/07/2017. Cycle # 15 FOLFIRI + Avastin was on 02/21/2017. Cycle # 16 FOLFIRI + Avastin was on 03/07/2017. Cycle # 17 FOLFIRI + Avastin was on 03/21/2017. Cycle # 18 FOLFIRI + Avastin was on 04/04/2017. CT chest 04/17/2017 negative for metastatic disease. CT abdomen/pelvis 04/17/2017 Stable hypodense metastatic lesions within the liver. Stable area of increased sclerosis along the right acetabulum  Bone scan 04/17/2017 Stable subtle uptake within the left proximal femoral diaphysis; No definite abnormal uptake in the region of a sclerotic lesion along the posterior column of the right acetabulum noted on CT. Stable slight uptake within the left anterior sixth rib corresponding to linear sclerosis on the recent CT, favored to be related to a fracture. Continue FOLFIRI + Avastin and repeat scans in 3 months. Cycle # 19 FOLFIRI + Avastin was on 04/18/2017. Cycle # 20 FOLFIRI + Avastin was on 05/02/2017. Cycle # 21 FOLFIRI + Avastin was on 05/16/2017. Cycle # 22 FOLFIRI + Avastin was on 05/30/2017. Cycle # 23 FOLFIRI + Avastin was on 06/13/2017. Cycle # 24 FOLFIRI + Avastin was on 06/27/2017. Cycle # 25 FOLFIRI + Avastin was on 07/11/2017. Cycle # 26 FOLFIRI + Avastin was on 07/25/2017. Cycle # 27 FOLFIRI + Avastin was on 08/08/2017. Cycle # 28 FOLFIRI + Avastin was on 08/22/2017. Cycle # 29 FOLFIRI + Avastin was on 09/05/2017. Cycle # 30 FOLFIRI + Avastin was on 09/19/2017. Bone scan 09/26/2017 stable. CT abdomen/pelvis on 09/26/2017 Stable hypodense mass in the liver. Stable sclerotic lesion in the acetabulum. CT chest 09/26/2017 negative for metastatic disease. Cycle # 31 FOLFIRI + Avastin was on 10/03/2017. CEA 7.4 on 10/03/2017. Cycle # 32 FOLFIRI + Avastin was on 10/17/2017. CEA 6.0 on 10/17/2017. Cycle # 33 FOLFIRI + Avastin was on 10/31/2017.  CEA 6.8 on Avastin was on 07/24/2018. Cycle # 52 FOLFIRI + Avastin was on 08/14/2018. Cycle # 53 FOLFIRI + Avastin was on 08/28/2018. CT chest 09/06/2018 noted interval decrease in size of LUCY measuring up to 4 mm, suggestive of treatment response. No mediastinal or hilar LN  CT abdomen/pelvis 09/06/2018 Slight interval increase in size of a previously noted metastatic lesion within the right hepatic lobe. This may be related to differences in phase of enhancement compared to the most recent study from 5/31/2018, the lesion remains decreased in size compared to the more previous studies. No abdominal, retroperitoneal, or pelvic lymphadenopathy. Continue FOLFIRI + Avastin and repeat scans in 2 months. Cycle # 54 FOLFIRI + Avastin was on 09/11/2018. Cycle # 55 FOLFIRI + Avastin was on 09/25/2018. Cycle # 56 FOLFIRI + Avastin was on 10/09/2018. Cycle # 57 FOLFIRI + Avastin was on 10/23/2018. CT chest 11/02/2018 stable 3 mm nodule within LUCY. No new right and left lung nodule. No mediastinal or osseous lesion. CT abdomen/pelvis 11/02/2018 stable metastatic disease to liver. No new metastatic disease identified. No mesenteric or retroperitoneal LN. No gross colonic lesion identified. Continue FOLFIRI + Avastin and repeat scans in 3 months. Cycle # 58 FOLFIRI + Avastin was on 11/06/2018. CEA 7.6 on 11/05/2018. Cycle # 59 FOLFIRI + Avastin was on 11/27/2018. CEA 6.9 on 11/26/2018. Cycle # 60 FOLFIRI + Avastin was on 12/11/2018. CEA 6.7 on 12/11/2018. Cycle # 61 FOLFIRI + Avastin was on 01/08/2019. CEA 5.6 on 01/07/2019. Cycle # 62 FOLFIRI + Avastin was on 01/29/2019. CEA 4.6 on 01/28/2019. Cycle # 63 FOLFIRI + Avastin was on 02/12/2019. CEA 4.3 on 02/11/2019. CT chest 02/21/2019 no evidence of metastatic disease. CT abdomen/pelvis 02/21/2019 stable hypodense liver lesions. No evidence of worsening metastatic disease. 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  Ordered MRI thoracic spine and referred to neurosurgery team.    Cycle # 64 FOLFIRI + Avastin was on 02/26/2019. CEA 4.7 on 02/25/2019. Cycle # 65 FOLFIRI + Avastin was on 03/12/2019. CEA 4.0 on 03/11/2019. Cycle # 66 FOLFIRI + Avastin was on 03/26/2019. CEA 4.7 on 03/25/2019. Cycle # 67 FOLFIRI + Avastin was on 04/09/2019. CEA 5.6 on 04/08/2019. Cycle # 68 FOLFIRI + Avastin was on 04/30/2019. CEA 4.9 on 04/29/2019. Cycle # 69 FOLFIRI + Avastin was on 05/14/2019. CEA 5.3 on 05/14/2019. CT chest 05/23/2019 stable small lung nodule with no evidence of metastatic disease. CT abdomen/pelvis 05/23/2019 Decrease conspicuity of the liver lesions. No new lesions seen. Stable splenomegaly. Continue FOLFIRI + Avastin and repeat scans in 3 months. Cycle # 70 FOLFIRI + Avastin was on 06/04/2019. CEA 4.8 on 06/03/2019. Cycle # 71 FOLFIRI + Avastin was on 06/18/2019. CEA 4.6 on 06/17/2019. Cycle # 72 FOLFIRI + Avastin was on 07/09/2019. CEA 4.3 on 07/08/2019. Cycle # 73 FOLFIRI + Avastin was on 07/30/2019. CEA 5.0 on 07/29/2019. Cycle # 74 FOLFIRI + Avastin was on 08/13/2019. CEA 5.0 on 08/12/2019. CT chest 08/20/2019 negative  CT abdomen/pelvis 08/20/2019 Previous identified hepatic lesions are not visualized on the current exam.  Continue FOLFIRI + Avastin and repeat scans in 3 months. Cycle # 75 FOLFIRI + Avastin was on 08/27/2019. CEA 5.0 on 08/26/2019. Cycle # 76 FOLFIRI + Avastin was on 09/17/2019. CEA 5.5 on 09/16/2019. Cycle # 77 FOLFIRI + Avastin was on 10/15/2019. CEA 4.6 on 10/15/2019. Cycle # 78 FOLFIRI + Avastin was on 10/29/2019. CEA 4.1 on 10/28/2019. Cycle # 79 FOLFIRI + Avastin was on 11/12/2019. CEA 4.3 on 11/12/2019. Cycle # 80 FOLFIRI + Avastin was on 12/10/2019. CEA 4.0 on 12/09/2019. CT chest 12/19/2019 Stable 3 mm nodule within the left upper lobe, similar to the previous studies dating back to 11/2/2018, however decreased in size compared to the CT from 3/26/2018. No thoracic LN.    CT abdomen/pelvis 12/19/2019 Previously described small hypodense lesions are more conspicuous on the current study compared to the recent study throughout the liver from 8/20/2019, however similar to the prior study from 2/21/2019. Findings may be related to differences in contrast opacification. No abdominal or pelvic lymphadenopathy. Continue FOLFIRI + Avastin and repeat scans in 2 months to f/u on liver lesions. Cycle # 81 FOLFIRI + Avastin was on 01/07/2020. CEA 3.8 on 01/06/2020. Cycle # 82 FOLFIRI + Avastin was on 01/21/2020. CEA 3.7 on 01/20/2020. Cycle # 83 FOLFIRI + Avastin was on 02/04/2020. CEA 4.1 on 02/03/2020. Cycle # 84 FOLFIRI + Avastin was on 02/18/2020. CEA 4.6 on 02/17/2020. EGD by Dr. Nedra Preciado 03/02/2020 showing no masses or lesions  Cycle # 85 FOLFIRI + Avastin was on 03/03/2020. CEA 7.4 on 03/02/2020. He presented today 03/17/2020 for Cycle # 86 FOLFIRI + Avastin and c/o dry skin in both hands and palmoplantar erythrodysesthesia. He has a colonoscopy scheduled next week by Dr. Nedra Preciado. No fever chills. No nausea vomiting. No bleeding. No abdominal pain. No chest pain or shortness of breath    Review of Systems;  CONSTITUTIONAL: No fever, chills. Fair appetite and energy level. ENMT: Eyes: No diplopia; Nose: No epistaxis. Mouth: No sore throat  RESPIRATORY: No hemoptysis, shortness of breath. CARDIOVASCULAR: No chest pain, palpitations. GASTROINTESTINAL: No nausea/vomiting, abdominal pain  GENITOURINARY: No dysuria, urinary frequency, hematuria. SKIN: Dry skin and PPE (palmoplantar erythrodysesthesia)  NEURO: No syncope, presyncope, headache. Remainder ROS: Negative. Past Medical History:   Past Medical History               Diagnosis Date    Arthritis      Cancer ([facility] [address]         colon    Depression      Hyperlipidemia      Hypertension           Medications:  Reviewed and reconciled.     Allergies:        Allergies   Allergen Reactions    Neosporin [Neomycin-Polymyxin-Gramicidin] Rash    Tape Kory Min Tape] Rash      Physical Exam:  /68 (Site: Right Upper Arm, Position: Sitting, Cuff Size: Medium Adult)   Pulse 70   Temp 97.2 °F (36.2 °C) (Temporal)   Ht 6' 1\" (1.854 m)   Wt 198 lb 14.4 oz (90.2 kg)   SpO2 100%   BMI 26.24 kg/m²   GENERAL: Alert, oriented x 3, not in acute distress  HEENT: PERRLA, EOMI. No oral lesions. NECK: Supple. Without lymphadenopathy. LUNGS: Lungs are CTA bilaterally, with no wheezing, crackles or rhonchi. CARDIOVASCULAR: Regular rhythm. No murmurs, rubs or gallops. ABDOMEN: Soft. Non-tender, non-distended. Positive bowel sounds. EXTREMITIES: Without clubbing, cyanosis, or edema. NEUROLOGIC: No focal deficits. ECOG PS 1    Diagnostics:  Lab Results   Component Value Date    WBC 1.6 (L) 03/16/2020    HGB 7.8 (L) 03/16/2020    HCT 25.0 (L) 03/16/2020    MCV 99.6 03/16/2020     (L) 03/16/2020     Lab Results   Component Value Date     03/16/2020    K 3.7 03/16/2020     03/16/2020    CO2 22 03/16/2020    BUN 9 03/16/2020    CREATININE 1.1 03/16/2020    GLUCOSE 143 (H) 03/16/2020    CALCIUM 9.0 03/16/2020    PROT 6.5 03/16/2020    LABALBU 3.6 03/16/2020    BILITOT 0.8 03/16/2020    ALKPHOS 134 (H) 03/16/2020    AST 22 03/16/2020    ALT 12 03/16/2020    LABGLOM >60 03/16/2020    GFRAA >60 03/16/2020     Lab Results   Component Value Date    CEA 4.5 03/16/2020      Impression/Plan:  79 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 KRAS Mutation: Mutation detected. BRAF Mutation: Mutation not detected. 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 CT scan abdomen/pelvis on 10/26/2015 revealed small nodules at the lung bases, extensive liver lesions consistent with metastatic rectosigmoid cancer. CEA 3488 on 10/30/2015. Bone scan on 11/10/2015 noted no metastatic disease.  CT chest on 11/10/2015 revealed Multiple pulmonary nodules in the upper and lower lobes Avastin was on 02/21/2017. Cycle # 16 FOLFIRI + Avastin was on 03/07/2017. Cycle # 17 FOLFIRI + Avastin was on 03/21/2017. Cycle # 18 FOLFIRI + Avastin was on 04/04/2017. CT chest 04/17/2017 negative for metastatic disease. CT abdomen/pelvis 04/17/2017 Stable hypodense metastatic lesions within the liver. Stable area of increased sclerosis along the right acetabulum  Bone scan 04/17/2017 Stable subtle uptake within the left proximal femoral diaphysis; No definite abnormal uptake in the region of a sclerotic lesion along the posterior column of the right acetabulum noted on CT. Stable slight uptake within the left anterior sixth rib corresponding to linear sclerosis on the recent CT, favored to be related to a fracture. Continue FOLFIRI + Avastin and repeat scans in 3 months. Cycle # 19 FOLFIRI + Avastin was on 04/18/2017. CEA 7.5 on 04/18/2017. Cycle # 20 FOLFIRI + Avastin was on 05/02/2017. CEA 7.7 on 05/02/2017. Cycle # 21 FOLFIRI + Avastin was on 05/16/2017. CEA 7.1 on 05/16/2017. Cycle # 22 FOLFIRI + Avastin was on 05/30/2017. CEA 8.2 on 05/30/2017. Cycle # 23 FOLFIRI + Avastin was on 06/13/2017. CEA 7.7 on 06/13/2017. Cycle # 24 FOLFIRI + Avastin was on 06/27/2017. CEA 6.6 on 06/27/2017. Re-staging scans 07/05/2017:  Bone scan stable proximal left femoral diaphysis, right acetabulum, and left anterior sixth rib lesions;  CT abdomen/pelvis stable hypodense metastatic lesions within the liver; CT chest without convincing evidence of metastatic disease. Cycle # 25 FOLFIRI + Avastin was on 07/11/2017. CEA 6.3 on 07/11/2017. Cycle # 26 FOLFIRI + Avastin was on 07/25/2017. CEA 7.3 on 07/25/2017. Cycle # 27 FOLFIRI + Avastin was on 08/08/2017. CEA 6.5 on 08/08/2017. Cycle # 28 FOLFIRI + Avastin was on 08/22/2017. CEA 5.9 on 08/22/2017. Cycle # 29 FOLFIRI + Avastin was on 09/05/2017. CEA 6.3 on 09/05/2017. Cycle # 30 FOLFIRI + Avastin was on 09/19/2017.   CEA 6.3 on 09/19/2017. Bone scan 09/26/2017 stable. CT abdomen/pelvis on 09/26/2017 Stable hypodense mass in the liver. Stable sclerotic lesion in the acetabulum. CT chest 09/26/2017 negative for metastatic disease. Cycle # 31 FOLFIRI + Avastin was on 10/03/2017. CEA 7.4 on 10/03/2017. Cycle # 32 FOLFIRI + Avastin was on 10/17/2017. CEA 6.0 on 10/17/2017. Cycle # 33 FOLFIRI + Avastin was on 10/31/2017. CEA 6.8 on 10/31/2017. Cycle # 34 FOLFIRI + Avastin was on 11/14/2017. CEA 7.2 on 11/14/2017. Cycle # 35 FOLFIRI + Avastin was on 11/28/2017. CEA 5.1 on 11/28/2017. Cycle # 36 FOLFIRI + Avastin was on 12/12/2017. CEA 6.1 on 12/12/2017. Bone scan 12/22/2017 noted no evidence of metastatic disease to the axial or appendicular skeleton. CT chest 12/22/2017 noted no evidence of metastatic disease. CT abdomen/pelvis 12/22/2017 noted stable hypodense lesions in the liver. Continue FOLFIRI + Avastin and repeat scans in 3 months. Cycle # 37 FOLFIRI + Avastin was on 12/27/2018. CEA 6.7 on 12/27/2017. Cycle # 38 FOLFIRI + Avastin was on 01/09/2018. CEA 5.0 on 01/09/2018. Cycle # 39 FOLFIRI + Avastin was on 01/23/2018. CEA 6.5 on 01/23/2018. Cycle # 40 FOLFIRI + Avastin was on 02/06/2018. CEA 6.9 on 02/06/2018. Cycle # 41 FOLFIRI + Avastin was on 02/20/2018. CEA 6.4 on 02/20/2018. Cycle # 42 FOLFIRI + Avastin was on 03/06/2018. CEA 6.6 on 03/06/2018. Cycle # 43 FOLFIRI + Avastin was on 03/20/2018. CEA 5.7 on 03/20/2018. Bone scan on 03/26/2018 negative for metastatic disease. CT chest 03/26/2018 noted ? new 7 mm nodule in LUCY. CT abdomen/pelvis 03/26/2018 noted stable hypodense lesions in the liver. ? New small ascites in the abdomen. Patient wants to continue to monitor the lung finding and repeat scans in 2 months instead of changing the current chemotherapy regimen to oral Lonsurf. Cycle # 44 FOLFIRI + Avastin was on 04/03/2018. CEA 6.3 on 04/03/2018. Cycle # 45 FOLFIRI + Avastin was on 04/24/2018.  CEA 5.3 mesenteric or retroperitoneal LN. No gross colonic lesion identified. Continue FOLFIRI + Avastin and repeat scans in 3 months. Cycle # 58 FOLFIRI + Avastin was on 11/06/2018. CEA 7.6 on 11/05/2018. Cycle # 59 FOLFIRI + Avastin was on 11/27/2018. CEA 6.9 on 11/26/2018. Cycle # 60 FOLFIRI + Avastin was on 12/11/2018. CEA 6.7 on 12/11/2018. Cycle # 61 FOLFIRI + Avastin was on 01/08/2019. CEA 5.6 on 01/07/2019. Cycle # 62 FOLFIRI + Avastin was on 01/29/2019. CEA 4.6 on 01/28/2019. Cycle # 63 FOLFIRI + Avastin was on 02/12/2019. CEA 4.3 on 02/11/2019. CT chest 02/21/2019 no evidence of metastatic disease. CT abdomen/pelvis 02/21/2019 stable hypodense liver lesions. No evidence of worsening metastatic disease. 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 Ordered MRI thoracic spine and referred to neurosurgery team.  Cycle # 64 FOLFIRI + Avastin was on 02/26/2019. CEA 4.7 on 02/25/2019. Cycle # 65 FOLFIRI + Avastin was on 03/12/2019. CEA 4.0 on 03/11/2019. Cycle # 66 FOLFIRI + Avastin was on 03/26/2019. CEA 4.7 on 03/25/2019. Cycle # 67 FOLFIRI + Avastin was on 04/09/2019. CEA 5.6 on 04/08/2019. Cycle # 68 FOLFIRI + Avastin was on 04/30/2019. CEA 4.9 on 04/29/2019. Cycle # 69 FOLFIRI + Avastin was on 05/14/2019. CEA 5.3 on 05/14/2019. CT chest 05/23/2019 stable small lung nodule with no evidence of metastatic disease. CT abdomen/pelvis 05/23/2019 Decrease conspicuity of the liver lesions. No new lesions seen. Stable splenomegaly. Continue FOLFIRI + Avastin and repeat scans in 3 months. Cycle # 70 FOLFIRI + Avastin was on 06/04/2019. CEA 4.8 on 06/03/2019. Cycle # 71 FOLFIRI + Avastin was on 06/18/2019. CEA 4.6 on 06/17/2019. Cycle # 72 FOLFIRI + Avastin was on 07/09/2019. CEA 4.3 on 07/08/2019. Cycle # 73 FOLFIRI + Avastin was on 07/30/2019. CEA 5.0 on 07/29/2019. Cycle # 74 FOLFIRI + Avastin was on 08/13/2019. CEA 5.0 on 08/12/2019.   CT chest 08/20/2019 negative  CT abdomen/pelvis 08/20/2019 Previous identified hepatic lesions are not visualized on the current exam.  Continue FOLFIRI + Avastin and repeat scans in 3 months. Cycle # 75 FOLFIRI + Avastin was on 08/27/2019. CEA 5.0 on 08/26/2019. Cycle # 76 FOLFIRI + Avastin was on 09/17/2019. CEA 5.5 on 09/16/2019. Cycle # 77 FOLFIRI + Avastin was on 10/15/2019. CEA 4.6 on 10/15/2019. Cycle # 78 FOLFIRI + Avastin was on 10/29/2019. CEA 4.1 on 10/28/2019. Cycle # 79 FOLFIRI + Avastin was on 11/12/2019. CEA 4.3 on 11/12/2019. Cycle # 80 FOLFIRI + Avastin was on 12/10/2019. CEA 4.0 on 12/09/2019. CT chest 12/19/2019 Stable 3 mm nodule within the left upper lobe, similar to the previous studies dating back to 11/2/2018, however decreased in size compared to the CT from 3/26/2018. No thoracic LN. CT abdomen/pelvis 12/19/2019 Previously described small hypodense lesions are more conspicuous on the current study compared to the recent study throughout the liver from 8/20/2019, however similar to the prior study from 2/21/2019. Findings may be related to differences in contrast opacification. No abdominal or pelvic lymphadenopathy. Continue FOLFIRI + Avastin and repeat scans in 2 months to f/u on liver lesions. Cycle # 81 FOLFIRI + Avastin was on 01/07/2020. CEA 3.8 on 01/06/2020. Cycle # 82 FOLFIRI + Avastin was on 01/21/2020. CEA 3.7 on 01/20/2020. Cycle # 83 FOLFIRI + Avastin was on 02/04/2020. CEA 4.1 on 02/03/2020. Cycle # 84 FOLFIRI + Avastin was on 02/18/2020. CEA 4.6 on 02/17/2020. CT chest 2/28/2020 no evidence of metastatic disease to the lungs and no mediastinal or hilar adenopathy. CT abdomen pelvis 2/28/2020 no enhancing lesions seen within the liver to suggest metastatic disease. Wall thickening of the rectosigmoid junction. Images reviewed. Continue FOLFIRI Avastin and repeat scans in 3 months  EGD by Dr. Johana Roland 03/02/2020 showing no masses or lesions.   Cycle # 85 FOLFIRI + Avastin was on 03/03/2020. CEA 7.4 on 03/02/2020. Cycle # 86 FOLFIRI + Avastin is today 03/17/2020. CEA 4.5 on 03/16/2020. Labs reviewed, ok to proceed. Hb 7.8; Transfuse if Hb <7.0. PPE palmoplantar erythrodysesthesia; encouraged to increase p.o. fluid intake and to continue skin moisturizers. Colonoscopy next week by Dr. Alireza Keen.     RTC 2 weeks for Cycle # 87 FOLFIRI + Avastin  MSI testing noted no mismatch repair protein loss of expression    3/17/2020  Eduard Moss MD  Board Certified Medical Oncologist

## 2020-03-19 ENCOUNTER — HOSPITAL ENCOUNTER (OUTPATIENT)
Dept: INFUSION THERAPY | Age: 70
Discharge: HOME | End: 2020-03-19
Payer: MEDICARE

## 2020-03-19 DIAGNOSIS — C20 RECTAL CANCER METASTASIZED TO LIVER (HCC): ICD-10-CM

## 2020-03-19 DIAGNOSIS — C78.7 RECTAL CANCER METASTASIZED TO LIVER (HCC): ICD-10-CM

## 2020-03-19 DIAGNOSIS — Z51.11 ENCOUNTER FOR ANTINEOPLASTIC CHEMOTHERAPY: Primary | ICD-10-CM

## 2020-03-19 PROCEDURE — 6360000002 HC RX W HCPCS: Performed by: INTERNAL MEDICINE

## 2020-03-19 PROCEDURE — 96372 THER/PROPH/DIAG INJ SC/IM: CPT

## 2020-03-19 RX ADMIN — PEGFILGRASTIM 6 MG: 6 INJECTION SUBCUTANEOUS at 14:50

## 2020-03-24 ENCOUNTER — TELEPHONE (OUTPATIENT)
Dept: INFUSION THERAPY | Age: 70
End: 2020-03-24

## 2020-03-24 NOTE — TELEPHONE ENCOUNTER
Received a call from Edel Oropeza. He relays that he is experiencing a \" sore throat, no fever, no cough, and is able to eat/drink but it is painful. Also feels as though \" my lymph nodes at my neck are swollen\". Dr. Parsons Cons advised of symptoms and recommends he use Magic Mouth wash and/or salt water gargles. To call here or PCP if symptoms persist or worsen. Pt agreeable to plan.

## 2020-03-30 ENCOUNTER — HOSPITAL ENCOUNTER (OUTPATIENT)
Age: 70
Discharge: HOME | End: 2020-03-30
Payer: MEDICARE

## 2020-03-30 LAB
ALBUMIN SERPL-MCNC: 3.9 G/DL (ref 3.5–5.2)
ALP BLD-CCNC: 152 U/L (ref 40–129)
ALT SERPL-CCNC: 18 U/L (ref 0–40)
ANION GAP SERPL CALCULATED.3IONS-SCNC: 15 MMOL/L (ref 7–16)
ANISOCYTOSIS: ABNORMAL
AST SERPL-CCNC: 34 U/L (ref 0–39)
BASOPHILS ABSOLUTE: 0.02 E9/L (ref 0–0.2)
BASOPHILS RELATIVE PERCENT: 0.8 % (ref 0–2)
BILIRUB SERPL-MCNC: 1.2 MG/DL (ref 0–1.2)
BUN BLDV-MCNC: 12 MG/DL (ref 8–23)
CALCIUM SERPL-MCNC: 9.2 MG/DL (ref 8.6–10.2)
CEA: 4.1 NG/ML (ref 0–5.2)
CHLORIDE BLD-SCNC: 102 MMOL/L (ref 98–107)
CO2: 23 MMOL/L (ref 22–29)
CREAT SERPL-MCNC: 1.2 MG/DL (ref 0.7–1.2)
EOSINOPHILS ABSOLUTE: 0.07 E9/L (ref 0.05–0.5)
EOSINOPHILS RELATIVE PERCENT: 2.7 % (ref 0–6)
GFR AFRICAN AMERICAN: >60
GFR NON-AFRICAN AMERICAN: 60 ML/MIN/1.73
GLUCOSE BLD-MCNC: 124 MG/DL (ref 74–99)
HCT VFR BLD CALC: 25.8 % (ref 37–54)
HEMOGLOBIN: 8.2 G/DL (ref 12.5–16.5)
HYPOCHROMIA: ABNORMAL
IMMATURE GRANULOCYTES #: 0.02 E9/L
IMMATURE GRANULOCYTES %: 0.8 % (ref 0–5)
LYMPHOCYTES ABSOLUTE: 0.54 E9/L (ref 1.5–4)
LYMPHOCYTES RELATIVE PERCENT: 20.6 % (ref 20–42)
MCH RBC QN AUTO: 31.7 PG (ref 26–35)
MCHC RBC AUTO-ENTMCNC: 31.8 % (ref 32–34.5)
MCV RBC AUTO: 99.6 FL (ref 80–99.9)
MONOCYTES ABSOLUTE: 0.24 E9/L (ref 0.1–0.95)
MONOCYTES RELATIVE PERCENT: 9.2 % (ref 2–12)
NEUTROPHILS ABSOLUTE: 1.73 E9/L (ref 1.8–7.3)
NEUTROPHILS RELATIVE PERCENT: 65.9 % (ref 43–80)
OVALOCYTES: ABNORMAL
PDW BLD-RTO: 20.8 FL (ref 11.5–15)
PLATELET # BLD: 106 E9/L (ref 130–450)
PMV BLD AUTO: 9.7 FL (ref 7–12)
POIKILOCYTES: ABNORMAL
POLYCHROMASIA: ABNORMAL
POTASSIUM SERPL-SCNC: 3.9 MMOL/L (ref 3.5–5)
RBC # BLD: 2.59 E12/L (ref 3.8–5.8)
SODIUM BLD-SCNC: 140 MMOL/L (ref 132–146)
TOTAL PROTEIN: 6.6 G/DL (ref 6.4–8.3)
WBC # BLD: 2.6 E9/L (ref 4.5–11.5)

## 2020-03-30 PROCEDURE — 82378 CARCINOEMBRYONIC ANTIGEN: CPT

## 2020-03-30 PROCEDURE — 85025 COMPLETE CBC W/AUTO DIFF WBC: CPT

## 2020-03-30 PROCEDURE — 80053 COMPREHEN METABOLIC PANEL: CPT

## 2020-03-30 PROCEDURE — 36415 COLL VENOUS BLD VENIPUNCTURE: CPT

## 2020-03-31 ENCOUNTER — OFFICE VISIT (OUTPATIENT)
Dept: ONCOLOGY | Age: 70
End: 2020-03-31
Payer: MEDICARE

## 2020-03-31 ENCOUNTER — HOSPITAL ENCOUNTER (OUTPATIENT)
Dept: INFUSION THERAPY | Age: 70
Discharge: HOME | End: 2020-03-31
Payer: MEDICARE

## 2020-03-31 VITALS
TEMPERATURE: 96.7 F | OXYGEN SATURATION: 99 % | BODY MASS INDEX: 24.56 KG/M2 | WEIGHT: 185.3 LBS | HEART RATE: 69 BPM | SYSTOLIC BLOOD PRESSURE: 114 MMHG | HEIGHT: 73 IN | DIASTOLIC BLOOD PRESSURE: 67 MMHG

## 2020-03-31 VITALS
SYSTOLIC BLOOD PRESSURE: 106 MMHG | DIASTOLIC BLOOD PRESSURE: 66 MMHG | RESPIRATION RATE: 18 BRPM | TEMPERATURE: 97.4 F | HEART RATE: 58 BPM

## 2020-03-31 DIAGNOSIS — C78.7 RECTAL CANCER METASTASIZED TO LIVER (HCC): Primary | ICD-10-CM

## 2020-03-31 DIAGNOSIS — C20 RECTAL CANCER METASTASIZED TO LIVER (HCC): Primary | ICD-10-CM

## 2020-03-31 PROCEDURE — 96549 UNLISTED CHEMOTHERAPY PX: CPT

## 2020-03-31 PROCEDURE — 96411 CHEMO IV PUSH ADDL DRUG: CPT

## 2020-03-31 PROCEDURE — 96413 CHEMO IV INFUSION 1 HR: CPT

## 2020-03-31 PROCEDURE — 6360000002 HC RX W HCPCS: Performed by: INTERNAL MEDICINE

## 2020-03-31 PROCEDURE — 96416 CHEMO PROLONG INFUSE W/PUMP: CPT

## 2020-03-31 PROCEDURE — 2580000003 HC RX 258: Performed by: INTERNAL MEDICINE

## 2020-03-31 PROCEDURE — 96417 CHEMO IV INFUS EACH ADDL SEQ: CPT

## 2020-03-31 PROCEDURE — 96415 CHEMO IV INFUSION ADDL HR: CPT

## 2020-03-31 PROCEDURE — 96368 THER/DIAG CONCURRENT INF: CPT

## 2020-03-31 PROCEDURE — 99214 OFFICE O/P EST MOD 30 MIN: CPT | Performed by: INTERNAL MEDICINE

## 2020-03-31 PROCEDURE — 96375 TX/PRO/DX INJ NEW DRUG ADDON: CPT

## 2020-03-31 RX ORDER — ATROPINE SULFATE 0.4 MG/ML
0.4 AMPUL (ML) INJECTION ONCE
Status: CANCELLED | OUTPATIENT
Start: 2020-03-31

## 2020-03-31 RX ORDER — ATROPINE SULFATE 0.4 MG/ML
0.4 AMPUL (ML) INJECTION ONCE
Status: COMPLETED | OUTPATIENT
Start: 2020-03-31 | End: 2020-03-31

## 2020-03-31 RX ORDER — SODIUM CHLORIDE 9 MG/ML
INJECTION, SOLUTION INTRAVENOUS CONTINUOUS
Status: CANCELLED | OUTPATIENT
Start: 2020-03-31

## 2020-03-31 RX ORDER — 0.9 % SODIUM CHLORIDE 0.9 %
10 VIAL (ML) INJECTION ONCE
Status: CANCELLED | OUTPATIENT
Start: 2020-03-31

## 2020-03-31 RX ORDER — SODIUM CHLORIDE 0.9 % (FLUSH) 0.9 %
10 SYRINGE (ML) INJECTION PRN
Status: CANCELLED | OUTPATIENT
Start: 2020-03-31

## 2020-03-31 RX ORDER — HEPARIN SODIUM (PORCINE) LOCK FLUSH IV SOLN 100 UNIT/ML 100 UNIT/ML
500 SOLUTION INTRAVENOUS PRN
Status: CANCELLED | OUTPATIENT
Start: 2020-03-31

## 2020-03-31 RX ORDER — DEXAMETHASONE SODIUM PHOSPHATE 10 MG/ML
10 INJECTION, SOLUTION INTRAMUSCULAR; INTRAVENOUS ONCE
Status: CANCELLED | OUTPATIENT
Start: 2020-03-31

## 2020-03-31 RX ORDER — DEXAMETHASONE SODIUM PHOSPHATE 10 MG/ML
10 INJECTION INTRAMUSCULAR; INTRAVENOUS ONCE
Status: COMPLETED | OUTPATIENT
Start: 2020-03-31 | End: 2020-03-31

## 2020-03-31 RX ORDER — SODIUM CHLORIDE 0.9 % (FLUSH) 0.9 %
10 SYRINGE (ML) INJECTION PRN
Status: DISCONTINUED | OUTPATIENT
Start: 2020-03-31 | End: 2020-04-01 | Stop reason: HOSPADM

## 2020-03-31 RX ORDER — FLUOROURACIL 50 MG/ML
850 INJECTION, SOLUTION INTRAVENOUS ONCE
Status: COMPLETED | OUTPATIENT
Start: 2020-03-31 | End: 2020-03-31

## 2020-03-31 RX ORDER — SODIUM CHLORIDE 9 MG/ML
250 INJECTION, SOLUTION INTRAVENOUS CONTINUOUS
Status: DISCONTINUED | OUTPATIENT
Start: 2020-03-31 | End: 2020-04-01 | Stop reason: HOSPADM

## 2020-03-31 RX ORDER — METHYLPREDNISOLONE SODIUM SUCCINATE 125 MG/2ML
125 INJECTION, POWDER, LYOPHILIZED, FOR SOLUTION INTRAMUSCULAR; INTRAVENOUS ONCE
Status: CANCELLED | OUTPATIENT
Start: 2020-03-31

## 2020-03-31 RX ORDER — HEPARIN SODIUM (PORCINE) LOCK FLUSH IV SOLN 100 UNIT/ML 100 UNIT/ML
500 SOLUTION INTRAVENOUS PRN
Status: DISCONTINUED | OUTPATIENT
Start: 2020-03-31 | End: 2020-04-01 | Stop reason: HOSPADM

## 2020-03-31 RX ORDER — FLUOROURACIL 50 MG/ML
850 INJECTION, SOLUTION INTRAVENOUS ONCE
Status: CANCELLED | OUTPATIENT
Start: 2020-03-31

## 2020-03-31 RX ORDER — EPINEPHRINE 1 MG/ML
0.3 INJECTION, SOLUTION, CONCENTRATE INTRAVENOUS PRN
Status: CANCELLED | OUTPATIENT
Start: 2020-03-31

## 2020-03-31 RX ORDER — DIPHENHYDRAMINE HYDROCHLORIDE 50 MG/ML
50 INJECTION INTRAMUSCULAR; INTRAVENOUS ONCE
Status: CANCELLED | OUTPATIENT
Start: 2020-03-31

## 2020-03-31 RX ORDER — PALONOSETRON HYDROCHLORIDE 0.05 MG/ML
0.25 INJECTION, SOLUTION INTRAVENOUS ONCE
Status: CANCELLED | OUTPATIENT
Start: 2020-03-31

## 2020-03-31 RX ORDER — SODIUM CHLORIDE 9 MG/ML
250 INJECTION, SOLUTION INTRAVENOUS CONTINUOUS
Status: CANCELLED | OUTPATIENT
Start: 2020-03-31

## 2020-03-31 RX ORDER — PALONOSETRON HYDROCHLORIDE 0.05 MG/ML
0.25 INJECTION, SOLUTION INTRAVENOUS ONCE
Status: COMPLETED | OUTPATIENT
Start: 2020-03-31 | End: 2020-03-31

## 2020-03-31 RX ADMIN — LEUCOVORIN CALCIUM 850 MG: 10 INJECTION INTRAMUSCULAR; INTRAVENOUS at 10:13

## 2020-03-31 RX ADMIN — SODIUM CHLORIDE, PRESERVATIVE FREE 10 ML: 5 INJECTION INTRAVENOUS at 12:13

## 2020-03-31 RX ADMIN — SODIUM CHLORIDE, PRESERVATIVE FREE 10 ML: 5 INJECTION INTRAVENOUS at 09:28

## 2020-03-31 RX ADMIN — SODIUM CHLORIDE 250 ML: 9 INJECTION, SOLUTION INTRAVENOUS at 08:54

## 2020-03-31 RX ADMIN — ATROPINE SULFATE 0.4 MG: 0.4 INJECTION, SOLUTION INTRAMUSCULAR; INTRAVENOUS; SUBCUTANEOUS at 08:56

## 2020-03-31 RX ADMIN — FLUOROURACIL 850 MG: 50 INJECTION, SOLUTION INTRAVENOUS at 12:14

## 2020-03-31 RX ADMIN — SODIUM CHLORIDE, PRESERVATIVE FREE 10 ML: 5 INJECTION INTRAVENOUS at 10:12

## 2020-03-31 RX ADMIN — SODIUM CHLORIDE 400 MG: 9 INJECTION, SOLUTION INTRAVENOUS at 10:15

## 2020-03-31 RX ADMIN — DEXAMETHASONE SODIUM PHOSPHATE 10 MG: 10 INJECTION INTRAMUSCULAR; INTRAVENOUS at 09:00

## 2020-03-31 RX ADMIN — BEVACIZUMAB 400 MG: 400 INJECTION, SOLUTION INTRAVENOUS at 09:30

## 2020-03-31 RX ADMIN — PALONOSETRON 0.25 MG: 0.25 INJECTION, SOLUTION INTRAVENOUS at 08:54

## 2020-03-31 NOTE — PROGRESS NOTES
[place] [id number]  Attending Clinic Note     Reason for Visit: Follow-up on a patient with Metastatic Rectal Cancer.     PCP: Shavon Guillen MD     History of Present Illness:  79 y/o  male who was referred to see Dr. aKvitha Schaefer (GI team) for evaluation of bright red blood per rectum, mild anemia and change in bowel habits with diarrhea. CEA 2640 on 10/19/2015. AlcP 299 AST 57 ALT 75 on 10/19/2015. Colonoscopy in 2013 noted no significant polyps, colitis or lesions at that time. Denies any Family History of colorectal cancer or polyps.     Colonoscopy on 10/19/2015 revealed:  1. Ascending polyp, 8 mm, hot snare: Tubulovillous adenoma. 2. Transverse polyp, 1 cm, hot snare: Serrated polyp most consistent with sessile serrated adenoma. 3. Five splenic flexure polyps, three - 5 mm, 7 mm, 8 mm, hot snare and biopsy: Four segments of Tubular Adenoma  4. Descending polyp, 5 mm, biopsy: Tubular adenoma. 5. Sigmoid polyp, 4 mm biopsy, Serrated polyp most consistent with sessile serrated adenoma. 6. Two rectal polyps, 4 mm biopsy: Serrated polyp most consistent with hyperplastic polyp. 7. Rectosigmoid colon mass (large mass approximately 65% circumference of the lumen; Very friable, firm and hard): Tubulovillous adenoma with associated focal erosion and fibroplasia.      CT scan abdomen/pelvis on 10/26/2015:  1. Small nodules at lung bases likely represent metastatic colon   cancer. 2. Extensive likely metastatic colon cancer throughout the liver.      3. Mild mural thickening and luminal narrowing in the   terminal ileum, otherwise nonspecific.      Colonoscopy with snare removal rectal mass was performed by Dr. Hemant Odom. Pathology proved:  Rectal polyp: Invasive adenocarcinoma involving villous adenoma and extending to the cauterized edge of excision. KRAS Mutation: Mutation detected. BRAF Mutation: Mutation not detected.   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     CEA on 08/30/2016. Cycle # 4 FOLFIRI/Avastin was on 09/13/2016. CEA 23.4 on 09/13/2016. Cycle # 5 FOLFIRI/Avastin was on 09/27/2016. CEA 22.3 on 09/27/2016. Cycle # 6 FOLFIRI/Avastin was on 10/11/2016. CEA 18.4 on 10/11/2016.      Bone scan 10/21/2016 stable bone metastasis; ? New lesion anterior left sixth rib likely interval healing fracture. CT abdomen/pelvis revealed stable hepatic metastasis. CT chest negative for metastatic disease. Continue FOLFIRI/Avastin and repeat scans in 3 months. Cycle # 7 FOLFIRI/Avastin was on 10/25/2016. CEA 16.1  Cycle # 8 FOLFIRI/Avastin was on 11/08/2016. CEA 15.7  Cycle # 9 FOLFIRI/Avastin was on 11/29/2016. CEA 13.6 on 11/29/2016. Cycle # 10 FOLFIRI/Avastin was on 12/13/2016. CEA 10.6 on 12/13/2016. Cycle # 11 FOLFIRI/Avastin was on 12/27/2016. CEA 11.1 on 12/27/2016. Cycle # 12 FOLFIRI/Avastin was on 01/10/2017. CEA 9.7 on 01/10/2017.       CT chest 01/23/2017 No new pulmonary nodule or lymphadenopathy. Patchy groundglass opacities within the left lower lobe, suggestive of infectious or inflammatory etiology. Followup CT chest in 3 months. Slight increased linear sclerosis along the left anterior sixth rib corresponding to an area of increased uptake on the prior bone scan from 10/21/2016, favored to be related to interval healing changes of a nondisplaced fracture. CT abdomen/pelvis on 01/23/2017 Redemonstration of multiple hepatic metastases, which are similar compared to the prior CT from 10/21/2016. Redemonstration of area of increased sclerosis along the right acetabulum suspicious for metastatic disease. Bone scan on 01/23/2017 Stable subtle uptake within the left proximal diaphysis, again suggestive of metastatic disease  Decreased subtle uptake within the medial right acetabulum. Decreased uptake within the left anterior sixth rib corresponding to linear sclerosis on the recent CT, favored to be related to an joint rib fracture.     Continue FOLFIRI + Avastin and repeat scans in 3 months. Cycle # 13 FOLFIRI + Avastin was on 01/24/2017. Cycle # 14 FOLFIRI + Avastin was on 02/07/2017. Cycle # 15 FOLFIRI + Avastin was on 02/21/2017. Cycle # 16 FOLFIRI + Avastin was on 03/07/2017. Cycle # 17 FOLFIRI + Avastin was on 03/21/2017. Cycle # 18 FOLFIRI + Avastin was on 04/04/2017. CT chest 04/17/2017 negative for metastatic disease. CT abdomen/pelvis 04/17/2017 Stable hypodense metastatic lesions within the liver. Stable area of increased sclerosis along the right acetabulum  Bone scan 04/17/2017 Stable subtle uptake within the left proximal femoral diaphysis; No definite abnormal uptake in the region of a sclerotic lesion along the posterior column of the right acetabulum noted on CT. Stable slight uptake within the left anterior sixth rib corresponding to linear sclerosis on the recent CT, favored to be related to a fracture. Continue FOLFIRI + Avastin and repeat scans in 3 months. Cycle # 19 FOLFIRI + Avastin was on 04/18/2017. Cycle # 20 FOLFIRI + Avastin was on 05/02/2017. Cycle # 21 FOLFIRI + Avastin was on 05/16/2017. Cycle # 22 FOLFIRI + Avastin was on 05/30/2017. Cycle # 23 FOLFIRI + Avastin was on 06/13/2017. Cycle # 24 FOLFIRI + Avastin was on 06/27/2017. Cycle # 25 FOLFIRI + Avastin was on 07/11/2017. Cycle # 26 FOLFIRI + Avastin was on 07/25/2017. Cycle # 27 FOLFIRI + Avastin was on 08/08/2017. Cycle # 28 FOLFIRI + Avastin was on 08/22/2017. Cycle # 29 FOLFIRI + Avastin was on 09/05/2017. Cycle # 30 FOLFIRI + Avastin was on 09/19/2017. Bone scan 09/26/2017 stable. CT abdomen/pelvis on 09/26/2017 Stable hypodense mass in the liver. Stable sclerotic lesion in the acetabulum. CT chest 09/26/2017 negative for metastatic disease. Cycle # 31 FOLFIRI + Avastin was on 10/03/2017. CEA 7.4 on 10/03/2017. Cycle # 32 FOLFIRI + Avastin was on 10/17/2017. CEA 6.0 on 10/17/2017. Cycle # 33 FOLFIRI + Avastin was on 10/31/2017.  CEA 6.8 on 10/31/2017. Cycle # 34 FOLFIRI + Avastin was on 11/14/2017. CEA 7.2 on 11/14/2017. Cycle # 35 FOLFIRI + Avastin was on 11/28/2017. CEA 5.1 on 11/28/2017. Cycle # 36 FOLFIRI + Avastin was on 12/12/2017. CEA 6.1 on 12/12/2017. Bone scan 12/22/2017 noted no evidence of metastatic disease to the axial or appendicular skeleton. CT chest 12/22/2017 noted no evidence of metastatic disease. CT abdomen/pelvis 12/22/2017 noted stable hypodense lesions in the liver. Continue FOLFIRI + Avastin and repeat scans in 3 months. Cycle # 37 FOLFIRI + Avastin was on 12/27/2018. Cycle # 38 FOLFIRI + Avastin was on 01/09/2018. Cycle # 39 FOLFIRI + Avastin was on 01/23/2018. Cycle # 40 FOLFIRI + Avastin was on 02/06/2018. Cycle # 41 FOLFIRI + Avastin was on 02/20/2018. Cycle # 42 FOLFIRI + Avastin was on 03/06/2018. Cycle # 43 FOLFIRI + Avastin was on 03/20/2018. Bone scan on 03/26/2018 negative for metastatic disease. CT chest 03/26/2018 noted ? new 7 mm nodule in LUCY. CT abdomen/pelvis 03/26/2018 noted stable hypodense lesions in the liver. ? New small ascites in the abdomen. Patient wants to continue to monitor the lung finding and repeat scans in 2 months instead of changing the current regimen into oral Lonsurf. Cycle # 44 FOLFIRI + Avastin was on 04/03/2018. CEA 6.3 on 04/03/2018. Cycle # 45 FOLFIRI + Avastin was on 04/24/2018. CEA 5.3 on 04/24/2018. Cycle # 46 FOLFIRI + Avastin was on 05/08/2018. CEA 5.4 on 05/08/2018. Cycle # 47 FOLFIRI + Avastin was on 05/22/2018. CEA 6.1 on 05/22/2018. CT chest 05/31/2018 noted stable nodule in LUCY. No evidence of worsening malignancy. CT abdomen/pelvis on 05/31/2018 noted stable liver metastasis. No evidence of worsening malignancy. Continue FOLFIRI + Avastin and repeat scans in 3 months. Cycle # 48 FOLFIRI + Avastin was on 06/06/2018. Cycle # 49 FOLFIRI + Avastin was on 06/19/2018. Cycle # 50 FOLFIRI + Avastin was on 07/03/2018.   Cycle # 51 FOLFIRI + Avastin was on 07/24/2018. Cycle # 52 FOLFIRI + Avastin was on 08/14/2018. Cycle # 53 FOLFIRI + Avastin was on 08/28/2018. CT chest 09/06/2018 noted interval decrease in size of LUCY measuring up to 4 mm, suggestive of treatment response. No mediastinal or hilar LN  CT abdomen/pelvis 09/06/2018 Slight interval increase in size of a previously noted metastatic lesion within the right hepatic lobe. This may be related to differences in phase of enhancement compared to the most recent study from 5/31/2018, the lesion remains decreased in size compared to the more previous studies. No abdominal, retroperitoneal, or pelvic lymphadenopathy. Continue FOLFIRI + Avastin and repeat scans in 2 months. Cycle # 54 FOLFIRI + Avastin was on 09/11/2018. Cycle # 55 FOLFIRI + Avastin was on 09/25/2018. Cycle # 56 FOLFIRI + Avastin was on 10/09/2018. Cycle # 57 FOLFIRI + Avastin was on 10/23/2018. CT chest 11/02/2018 stable 3 mm nodule within LUCY. No new right and left lung nodule. No mediastinal or osseous lesion. CT abdomen/pelvis 11/02/2018 stable metastatic disease to liver. No new metastatic disease identified. No mesenteric or retroperitoneal LN. No gross colonic lesion identified. Continue FOLFIRI + Avastin and repeat scans in 3 months. Cycle # 58 FOLFIRI + Avastin was on 11/06/2018. CEA 7.6 on 11/05/2018. Cycle # 59 FOLFIRI + Avastin was on 11/27/2018. CEA 6.9 on 11/26/2018. Cycle # 60 FOLFIRI + Avastin was on 12/11/2018. CEA 6.7 on 12/11/2018. Cycle # 61 FOLFIRI + Avastin was on 01/08/2019. CEA 5.6 on 01/07/2019. Cycle # 62 FOLFIRI + Avastin was on 01/29/2019. CEA 4.6 on 01/28/2019. Cycle # 63 FOLFIRI + Avastin was on 02/12/2019. CEA 4.3 on 02/11/2019. CT chest 02/21/2019 no evidence of metastatic disease. CT abdomen/pelvis 02/21/2019 stable hypodense liver lesions. No evidence of worsening metastatic disease. 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  Ordered abdomen/pelvis 12/19/2019 Previously described small hypodense lesions are more conspicuous on the current study compared to the recent study throughout the liver from 8/20/2019, however similar to the prior study from 2/21/2019. Findings may be related to differences in contrast opacification. No abdominal or pelvic lymphadenopathy. Continue FOLFIRI + Avastin and repeat scans in 2 months to f/u on liver lesions. Cycle # 81 FOLFIRI + Avastin was on 01/07/2020. CEA 3.8 on 01/06/2020. Cycle # 82 FOLFIRI + Avastin was on 01/21/2020. CEA 3.7 on 01/20/2020. Cycle # 83 FOLFIRI + Avastin was on 02/04/2020. CEA 4.1 on 02/03/2020. Cycle # 84 FOLFIRI + Avastin was on 02/18/2020. CEA 4.6 on 02/17/2020. EGD by Dr. Alma Stapleton 03/02/2020 showing no masses or lesions  Cycle # 85 FOLFIRI + Avastin was on 03/03/2020. CEA 7.4 on 03/02/2020. Cycle # 86 FOLFIRI + Avastin was on 03/17/2020. CEA 4.5 on 03/16/2020. Colonoscopy on 03/23/2020 by Dr. Alma Stapleton unremarkable (records requested). He presented today 03/31/2020 for Cycle # 87 FOLFIRI + Avastin. No fever chills. No nausea vomiting. No bleeding. No abdominal pain. No chest pain or shortness of breath. Losing weight. Intermittent diarrhea. Review of Systems;  CONSTITUTIONAL: No fever, chills. Fair appetite and energy level. Losing weight  ENMT: Eyes: No diplopia; Nose: No epistaxis. Mouth: No sore throat  RESPIRATORY: No hemoptysis, shortness of breath. CARDIOVASCULAR: No chest pain, palpitations. GASTROINTESTINAL: No nausea/vomiting, abdominal pain. Intermittent diarrhea. GENITOURINARY: No dysuria, urinary frequency, hematuria. NEURO: No syncope, presyncope, headache. Remainder ROS: Negative. Past Medical History:   Past Medical History               Diagnosis Date    Arthritis      Cancer ([facility] [address]         colon    Depression      Hyperlipidemia      Hypertension           Medications:  Reviewed and reconciled.     Allergies:        Allergies Allergen Reactions    Neosporin [Neomycin-Polymyxin-Gramicidin] Rash    Tape Tsang Roche Tape] Rash      Physical Exam:  /67 (Site: Left Upper Arm, Position: Sitting, Cuff Size: Medium Adult)   Pulse 69   Temp 96.7 °F (35.9 °C) (Temporal)   Ht 6' 1\" (1.854 m)   Wt 185 lb 4.8 oz (84.1 kg)   SpO2 99%   BMI 24.45 kg/m²   GENERAL: Alert, oriented x 3, not in acute distress  HEENT: PERRLA, EOMI. No oral lesions. NECK: Supple. Without lymphadenopathy. LUNGS: Lungs are CTA bilaterally, with no wheezing, crackles or rhonchi. CARDIOVASCULAR: Regular rhythm. No murmurs, rubs or gallops. ABDOMEN: Soft. Non-tender, non-distended. Positive bowel sounds. EXTREMITIES: Without clubbing, cyanosis, or edema. NEUROLOGIC: No focal deficits. ECOG PS 1    Diagnostics:  Lab Results   Component Value Date    WBC 2.6 (L) 03/30/2020    HGB 8.2 (L) 03/30/2020    HCT 25.8 (L) 03/30/2020    MCV 99.6 03/30/2020     (L) 03/30/2020     Lab Results   Component Value Date     03/30/2020    K 3.9 03/30/2020     03/30/2020    CO2 23 03/30/2020    BUN 12 03/30/2020    CREATININE 1.2 03/30/2020    GLUCOSE 124 (H) 03/30/2020    CALCIUM 9.2 03/30/2020    PROT 6.6 03/30/2020    LABALBU 3.9 03/30/2020    BILITOT 1.2 03/30/2020    ALKPHOS 152 (H) 03/30/2020    AST 34 03/30/2020    ALT 18 03/30/2020    LABGLOM 60 03/30/2020    GFRAA >60 03/30/2020     Lab Results   Component Value Date    CEA 4.1 03/30/2020      Impression/Plan:  79 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 KRAS Mutation: Mutation detected. BRAF Mutation: Mutation not detected. 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 CT scan abdomen/pelvis on 10/26/2015 revealed small nodules at the lung bases, extensive liver lesions consistent with metastatic rectosigmoid cancer. CEA 3488 on 10/30/2015. Bone scan on 11/10/2015 noted no metastatic disease.  CT chest on 11/10/2015 revealed Multiple pulmonary nodules was on 05/10/2016. .3 on 05/10/2016. Cycle # 14 FOLFOX+AVASTIN was on 05/24/2016. .5 on 05/24/2016. Cycle # 15 FOLFOX + Avastin was on 06/07/2016. CEA 90.5 on 06/07/2016. Ad[facility] to [facility]/13/2016-06/16/2016 for abdominal pain: EGD noted 1.5 cm clean based duodenal bulb ulceration s/p epinephrine and bicap per Dr. Desmond Boswell. No active bleeding. A. Stomach, biopsy: Mild chronic gastritis, immunostain negative for Helicobacter  B. Esophagus, biopsy: Gastric glandular mucosa with prominent ntestinal metaplasia ([place]'s epithelium), negative for epithelial dysplasia, esophageal squamous mucosa not identified     Cycle # 16 FOLFOX (discontinued avastin (bevacizumab) given association of peptic ulcer disease and known association of GI perforation.) was on 06/21/2016. CEA 47 on 06/21/2016. Cycle # 17 FOLFOX was on 07/05/2016. CEA 52.6 on 07/05/2016. CEA 47.6 on 07/19/2016.     Re-staging scans 07/19/2106: CT Chest negative for metastatic disease. CT Abdomen/Pelvis: Stable hepatic lesions; Question new mural thickening in the cecum. Bone Scan: New Let hip lesion suspicious for bone metastasis.     Increased CEA; new mural thickening in the cecum and new left hip lesion suspicious for bone metastasis are consistent with disease progression; He derived maximum benefit from FOLFOX/AVASTIN. D/C FOLFOX/AVASTIN. We recommended FOLFIRI second line therapy. Cycle # 1 FOLFIRI was on 08/02/2016. CEA 40.8 on 08/02/2016. Xgeva q4 weeks started on 08/02/2016. Cycle # 2 FOLFIRI was on 08/16/2016. CEA 31.7 on 08/16/2016. Cycle # 3 FOLFIRI (added Avastin) was on 08/30/2016 given that ulcers healed on EGD 08/15/2016 by Dr. Gwendolyn Solano; Protonix bid since avastin re-started. Colonoscopy on 08/29/2016 (to look at the cecal area) unremarkable. CEA 26.7 on 08/30/2016. Cycle # 4 FOLFIRI/Avastin was on 09/13/2016. CEA 23.4 on 09/13/2016. Cycle # 5 FOLFIRI/Avastin was on 09/27/2016. CEA 22.3 on 09/27/2016.    Cycle # 6 FOLFIRI/Avastin was on 10/11/2016. CEA 18.4 on 10/11/2016.      Bone scan 10/21/2016 stable bone metastasis; ? New lesion anterior left sixth rib likely interval healing fracture. CT abdomen/pelvis revealed stable hepatic metastasis. CT chest negative for metastatic disease. Continue FOLFIRI/Avastin and repeat scans in 3 months. Cycle # 7 FOLFIRI/Avastin was on 10/25/2016. CEA 16.1 on 10/25/2016. Cycle # 8 FOLFIRI/Avastin was on 11/08/2016. CEA 15.7 on 11/08/2016. Cycle # 9 FOLFIRI/Avastin was on 11/29/2016. CEA 13.6 on 11/29/2016. Cycle # 10 FOLFIRI/Avastin was on 12/13/2016. CEA 10.6 on 12/13/2016. Cycle # 11 FOLFIRI/Avastin was on 12/27/2016. CEA 11.1 on 12/27/2016. Cycle # 12 FOLFIRI/Avastin was on 01/10/2017. CEA 9.7 on 01/10/2017. CT chest 01/23/2017 No new pulmonary nodule or lymphadenopathy. Patchy groundglass opacities within the left lower lobe, suggestive of infectious or inflammatory etiology. Followup CT chest in 3 months. Slight increased linear sclerosis along the left anterior sixth rib corresponding to an area of increased uptake on the prior bone scan from 10/21/2016, favored to be related to interval healing changes of a nondisplaced fracture. CT abdomen/pelvis on 01/23/2017 Redemonstration of multiple hepatic metastases, which are similar compared to the prior CT from 10/21/2016. Redemonstration of area of increased sclerosis along the right acetabulum suspicious for metastatic disease. Bone scan on 01/23/2017 Stable subtle uptake within the left proximal diaphysis, again suggestive of metastatic disease  Decreased subtle uptake within the medial right acetabulum. Decreased uptake within the left anterior sixth rib corresponding to linear sclerosis on the recent CT, favored to be related to an joint rib fracture. Continue FOLFIRI + Avastin and repeat scans in 3 months. Cycle # 13 FOLFIRI + Avastin was on 01/24/2017. Cycle # 14 FOLFIRI + Avastin was on 02/07/2017. Cycle # 15 FOLFIRI + Avastin was on 02/21/2017. Cycle # 16 FOLFIRI + Avastin was on 03/07/2017. Cycle # 17 FOLFIRI + Avastin was on 03/21/2017. Cycle # 18 FOLFIRI + Avastin was on 04/04/2017. CT chest 04/17/2017 negative for metastatic disease. CT abdomen/pelvis 04/17/2017 Stable hypodense metastatic lesions within the liver. Stable area of increased sclerosis along the right acetabulum  Bone scan 04/17/2017 Stable subtle uptake within the left proximal femoral diaphysis; No definite abnormal uptake in the region of a sclerotic lesion along the posterior column of the right acetabulum noted on CT. Stable slight uptake within the left anterior sixth rib corresponding to linear sclerosis on the recent CT, favored to be related to a fracture. Continue FOLFIRI + Avastin and repeat scans in 3 months. Cycle # 19 FOLFIRI + Avastin was on 04/18/2017. CEA 7.5 on 04/18/2017. Cycle # 20 FOLFIRI + Avastin was on 05/02/2017. CEA 7.7 on 05/02/2017. Cycle # 21 FOLFIRI + Avastin was on 05/16/2017. CEA 7.1 on 05/16/2017. Cycle # 22 FOLFIRI + Avastin was on 05/30/2017. CEA 8.2 on 05/30/2017. Cycle # 23 FOLFIRI + Avastin was on 06/13/2017. CEA 7.7 on 06/13/2017. Cycle # 24 FOLFIRI + Avastin was on 06/27/2017. CEA 6.6 on 06/27/2017. Re-staging scans 07/05/2017:  Bone scan stable proximal left femoral diaphysis, right acetabulum, and left anterior sixth rib lesions;  CT abdomen/pelvis stable hypodense metastatic lesions within the liver; CT chest without convincing evidence of metastatic disease. Cycle # 25 FOLFIRI + Avastin was on 07/11/2017. CEA 6.3 on 07/11/2017. Cycle # 26 FOLFIRI + Avastin was on 07/25/2017. CEA 7.3 on 07/25/2017. Cycle # 27 FOLFIRI + Avastin was on 08/08/2017. CEA 6.5 on 08/08/2017. Cycle # 28 FOLFIRI + Avastin was on 08/22/2017. CEA 5.9 on 08/22/2017. Cycle # 29 FOLFIRI + Avastin was on 09/05/2017. CEA 6.3 on 09/05/2017. Cycle # 30 FOLFIRI + Avastin was on 09/19/2017. CEA 6.3 on 09/19/2017. Bone scan 09/26/2017 stable. CT abdomen/pelvis on 09/26/2017 Stable hypodense mass in the liver. Stable sclerotic lesion in the acetabulum. CT chest 09/26/2017 negative for metastatic disease. Cycle # 31 FOLFIRI + Avastin was on 10/03/2017. CEA 7.4 on 10/03/2017. Cycle # 32 FOLFIRI + Avastin was on 10/17/2017. CEA 6.0 on 10/17/2017. Cycle # 33 FOLFIRI + Avastin was on 10/31/2017. CEA 6.8 on 10/31/2017. Cycle # 34 FOLFIRI + Avastin was on 11/14/2017. CEA 7.2 on 11/14/2017. Cycle # 35 FOLFIRI + Avastin was on 11/28/2017. CEA 5.1 on 11/28/2017. Cycle # 36 FOLFIRI + Avastin was on 12/12/2017. CEA 6.1 on 12/12/2017. Bone scan 12/22/2017 noted no evidence of metastatic disease to the axial or appendicular skeleton. CT chest 12/22/2017 noted no evidence of metastatic disease. CT abdomen/pelvis 12/22/2017 noted stable hypodense lesions in the liver. Continue FOLFIRI + Avastin and repeat scans in 3 months. Cycle # 37 FOLFIRI + Avastin was on 12/27/2018. CEA 6.7 on 12/27/2017. Cycle # 38 FOLFIRI + Avastin was on 01/09/2018. CEA 5.0 on 01/09/2018. Cycle # 39 FOLFIRI + Avastin was on 01/23/2018. CEA 6.5 on 01/23/2018. Cycle # 40 FOLFIRI + Avastin was on 02/06/2018. CEA 6.9 on 02/06/2018. Cycle # 41 FOLFIRI + Avastin was on 02/20/2018. CEA 6.4 on 02/20/2018. Cycle # 42 FOLFIRI + Avastin was on 03/06/2018. CEA 6.6 on 03/06/2018. Cycle # 43 FOLFIRI + Avastin was on 03/20/2018. CEA 5.7 on 03/20/2018. Bone scan on 03/26/2018 negative for metastatic disease. CT chest 03/26/2018 noted ? new 7 mm nodule in LUCY. CT abdomen/pelvis 03/26/2018 noted stable hypodense lesions in the liver. ? New small ascites in the abdomen. Patient wants to continue to monitor the lung finding and repeat scans in 2 months instead of changing the current chemotherapy regimen to oral Lonsurf. Cycle # 44 FOLFIRI + Avastin was on 04/03/2018. CEA 6.3 on 04/03/2018.    Cycle # 45 FOLFIRI + Avastin was on 04/24/2018. CEA 5.3 on 04/24/2018. Cycle # 46 FOLFIRI + Avastin was on 05/08/2018. CEA 5.4 on 05/08/2018. Cycle # 47 FOLFIRI + Avastin was on 05/22/2018. CEA 6.1 on 05/22/2018. CT chest 05/31/2018 noted stable nodule in LUCY. No evidence of worsening malignancy. CT abdomen/pelvis on 05/31/2018 noted stable liver metastasis. No evidence of worsening malignancy. Continue FOLFIRI + Avastin and repeat scans in 3 months. Cycle # 48 FOLFIRI + Avastin was on 06/06/2018. CEA 6.3 on 06/05/2018. Cycle # 49 FOLFIRI + Avastin was on 06/19/2018. CEA 6.1 on 06/19/2018. Cycle # 50 FOLFIRI + Avastin was on 07/03/2018. CEA 7.4 on 07/03/2018. Cycle # 51 FOLFIRI + Avastin was on 07/24/2018. CEA 6.7 on 07/24/2018. Cycle # 52 FOLFIRI + Avastin was on 08/14/2018. CEA 6.8 on 08/14/2018. Cycle # 53 FOLFIRI + Avastin was on 08/28/2018. CEA 6.5 on 08/27/2018. CT chest 09/06/2018 noted interval decrease in size of LUCY measuring up to 4 mm, suggestive of treatment response. No mediastinal or hilar LN  CT abdomen/pelvis 09/06/2018 Slight interval increase in size of a previously noted metastatic lesion within the right hepatic lobe. This may be related to differences in phase of enhancement compared to the most recent study from 5/31/2018, the lesion remains decreased in size compared to the more previous studies. No abdominal, retroperitoneal, or pelvic lymphadenopathy. Continue FOLFIRI + Avastin and repeat scans in 2 months. Cycle # 54 FOLFIRI + Avastin was on 09/11/2018. CEA 6.4 on 09/10/2018. Cycle # 55 FOLFIRI + Avastin was on 09/25/2018. CEA 6.4 on 09/25/2018. Cycle # 56 FOLFIRI + Avastin was on 10/09/2018. CEA 6.7 on 10/08/2018. Cycle # 57 FOLFIRI + Avastin was on 10/23/2018. CEA 7.2 on 10/22/2018. CT chest 11/02/2018 stable 3 mm nodule within LUCY. No new right and left lung nodule. No mediastinal or osseous lesion. CT abdomen/pelvis 11/02/2018 stable metastatic disease to liver.    No new metastatic disease identified. No mesenteric or retroperitoneal LN. No gross colonic lesion identified. Continue FOLFIRI + Avastin and repeat scans in 3 months. Cycle # 58 FOLFIRI + Avastin was on 11/06/2018. CEA 7.6 on 11/05/2018. Cycle # 59 FOLFIRI + Avastin was on 11/27/2018. CEA 6.9 on 11/26/2018. Cycle # 60 FOLFIRI + Avastin was on 12/11/2018. CEA 6.7 on 12/11/2018. Cycle # 61 FOLFIRI + Avastin was on 01/08/2019. CEA 5.6 on 01/07/2019. Cycle # 62 FOLFIRI + Avastin was on 01/29/2019. CEA 4.6 on 01/28/2019. Cycle # 63 FOLFIRI + Avastin was on 02/12/2019. CEA 4.3 on 02/11/2019. CT chest 02/21/2019 no evidence of metastatic disease. CT abdomen/pelvis 02/21/2019 stable hypodense liver lesions. No evidence of worsening metastatic disease. 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 Ordered MRI thoracic spine and referred to neurosurgery team.  Cycle # 64 FOLFIRI + Avastin was on 02/26/2019. CEA 4.7 on 02/25/2019. Cycle # 65 FOLFIRI + Avastin was on 03/12/2019. CEA 4.0 on 03/11/2019. Cycle # 66 FOLFIRI + Avastin was on 03/26/2019. CEA 4.7 on 03/25/2019. Cycle # 67 FOLFIRI + Avastin was on 04/09/2019. CEA 5.6 on 04/08/2019. Cycle # 68 FOLFIRI + Avastin was on 04/30/2019. CEA 4.9 on 04/29/2019. Cycle # 69 FOLFIRI + Avastin was on 05/14/2019. CEA 5.3 on 05/14/2019. CT chest 05/23/2019 stable small lung nodule with no evidence of metastatic disease. CT abdomen/pelvis 05/23/2019 Decrease conspicuity of the liver lesions. No new lesions seen. Stable splenomegaly. Continue FOLFIRI + Avastin and repeat scans in 3 months. Cycle # 70 FOLFIRI + Avastin was on 06/04/2019. CEA 4.8 on 06/03/2019. Cycle # 71 FOLFIRI + Avastin was on 06/18/2019. CEA 4.6 on 06/17/2019. Cycle # 72 FOLFIRI + Avastin was on 07/09/2019. CEA 4.3 on 07/08/2019. Cycle # 73 FOLFIRI + Avastin was on 07/30/2019. CEA 5.0 on 07/29/2019. Cycle # 74 FOLFIRI + Avastin was on 08/13/2019. CEA 5.0 on 08/12/2019.   CT chest FOLFIRI + Avastin was on 03/03/2020. CEA 7.4 on 03/02/2020. Cycle # 86 FOLFIRI + Avastin was on 03/17/2020. CEA 4.5 on 03/16/2020. Colonoscopy on 03/23/2020 by Dr. Liz aguero (records requested). Cycle # 87 FOLFIRI + Avastin is today 03/31/2020. CEA 4.1 on 03/30/2020. Labs reviewed, ok to proceed. Continue alternating Imodium and Lomotil for diarrhea. Increase protein shakes given weight loss. Dietary to see.      RTC 2 weeks for Cycle # 88 FOLFIRI + Avastin  MSI testing noted no mismatch repair protein loss of expression    3/31/2020  Kalee Regalado MD  Board Certified Medical Oncologist

## 2020-03-31 NOTE — PROGRESS NOTES
Chemotherapy pump orders faxed to [facility] @ [phone] and call placed to [phone] and spoke with Neymar Sue to confirm receipt. The pump will be administered in the home and de accessed in 46 hrs. Encouraged to call with questions/concerns.

## 2020-04-02 ENCOUNTER — HOSPITAL ENCOUNTER (OUTPATIENT)
Dept: INFUSION THERAPY | Age: 70
Discharge: HOME | End: 2020-04-02
Payer: MEDICARE

## 2020-04-02 DIAGNOSIS — C78.7 RECTAL CANCER METASTASIZED TO LIVER (HCC): ICD-10-CM

## 2020-04-02 DIAGNOSIS — C20 RECTAL CANCER METASTASIZED TO LIVER (HCC): ICD-10-CM

## 2020-04-02 DIAGNOSIS — Z51.11 ENCOUNTER FOR ANTINEOPLASTIC CHEMOTHERAPY: Primary | ICD-10-CM

## 2020-04-02 PROCEDURE — 96372 THER/PROPH/DIAG INJ SC/IM: CPT

## 2020-04-02 PROCEDURE — 6360000002 HC RX W HCPCS: Performed by: INTERNAL MEDICINE

## 2020-04-02 PROCEDURE — 96401 CHEMO ANTI-NEOPL SQ/IM: CPT

## 2020-04-02 RX ADMIN — PEGFILGRASTIM 6 MG: 6 INJECTION SUBCUTANEOUS at 14:57

## 2020-04-06 ENCOUNTER — TELEPHONE (OUTPATIENT)
Dept: INFUSION THERAPY | Age: 70
End: 2020-04-06

## 2020-04-06 NOTE — TELEPHONE ENCOUNTER
4/6/20 at 1500- Called patient regarding phone call about sore in his mouth he is using magic mouth wash and salt water rinse. States no fever,is eating very little and drinking very little but is urinating and having BM. Had him check his tongue for white patches states none. He is coming into for FU visit on 4/7/20 @0830 and possible hydration. He will discuss his cont 5 FU with Kimberly Colon on 4/7/20 since bioscript is not coming into the homes with covid-19. Instructed to try to eat soft food and encouraged to keep as much as he can, patient verbalized understanding of this.

## 2020-04-07 ENCOUNTER — OFFICE VISIT (OUTPATIENT)
Dept: ONCOLOGY | Age: 70
End: 2020-04-07
Payer: MEDICARE

## 2020-04-07 ENCOUNTER — CLINICAL DOCUMENTATION (OUTPATIENT)
Dept: INFUSION THERAPY | Age: 70
End: 2020-04-07

## 2020-04-07 VITALS
DIASTOLIC BLOOD PRESSURE: 66 MMHG | BODY MASS INDEX: 24.28 KG/M2 | TEMPERATURE: 97.6 F | SYSTOLIC BLOOD PRESSURE: 140 MMHG | HEIGHT: 73 IN | OXYGEN SATURATION: 100 % | HEART RATE: 73 BPM | WEIGHT: 183.2 LBS

## 2020-04-07 PROCEDURE — 99214 OFFICE O/P EST MOD 30 MIN: CPT | Performed by: INTERNAL MEDICINE

## 2020-04-07 PROCEDURE — 99212 OFFICE O/P EST SF 10 MIN: CPT

## 2020-04-07 RX ORDER — CHLORHEXIDINE GLUCONATE 0.12 MG/ML
15 RINSE ORAL 2 TIMES DAILY
Qty: 420 ML | Refills: 0 | Status: SHIPPED | OUTPATIENT
Start: 2020-04-07 | End: 2020-04-21

## 2020-04-07 NOTE — PROGRESS NOTES
on 08/30/2016. Cycle # 4 FOLFIRI/Avastin was on 09/13/2016. CEA 23.4 on 09/13/2016. Cycle # 5 FOLFIRI/Avastin was on 09/27/2016. CEA 22.3 on 09/27/2016. Cycle # 6 FOLFIRI/Avastin was on 10/11/2016. CEA 18.4 on 10/11/2016.      Bone scan 10/21/2016 stable bone metastasis; ? New lesion anterior left sixth rib likely interval healing fracture. CT abdomen/pelvis revealed stable hepatic metastasis. CT chest negative for metastatic disease. Continue FOLFIRI/Avastin and repeat scans in 3 months. Cycle # 7 FOLFIRI/Avastin was on 10/25/2016. CEA 16.1  Cycle # 8 FOLFIRI/Avastin was on 11/08/2016. CEA 15.7  Cycle # 9 FOLFIRI/Avastin was on 11/29/2016. CEA 13.6 on 11/29/2016. Cycle # 10 FOLFIRI/Avastin was on 12/13/2016. CEA 10.6 on 12/13/2016. Cycle # 11 FOLFIRI/Avastin was on 12/27/2016. CEA 11.1 on 12/27/2016. Cycle # 12 FOLFIRI/Avastin was on 01/10/2017. CEA 9.7 on 01/10/2017.       CT chest 01/23/2017 No new pulmonary nodule or lymphadenopathy. Patchy groundglass opacities within the left lower lobe, suggestive of infectious or inflammatory etiology. Followup CT chest in 3 months. Slight increased linear sclerosis along the left anterior sixth rib corresponding to an area of increased uptake on the prior bone scan from 10/21/2016, favored to be related to interval healing changes of a nondisplaced fracture. CT abdomen/pelvis on 01/23/2017 Redemonstration of multiple hepatic metastases, which are similar compared to the prior CT from 10/21/2016. Redemonstration of area of increased sclerosis along the right acetabulum suspicious for metastatic disease. Bone scan on 01/23/2017 Stable subtle uptake within the left proximal diaphysis, again suggestive of metastatic disease  Decreased subtle uptake within the medial right acetabulum. Decreased uptake within the left anterior sixth rib corresponding to linear sclerosis on the recent CT, favored to be related to an joint rib fracture.     Continue FOLFIRI + Avastin and repeat scans in 3 months. Cycle # 13 FOLFIRI + Avastin was on 01/24/2017. Cycle # 14 FOLFIRI + Avastin was on 02/07/2017. Cycle # 15 FOLFIRI + Avastin was on 02/21/2017. Cycle # 16 FOLFIRI + Avastin was on 03/07/2017. Cycle # 17 FOLFIRI + Avastin was on 03/21/2017. Cycle # 18 FOLFIRI + Avastin was on 04/04/2017. CT chest 04/17/2017 negative for metastatic disease. CT abdomen/pelvis 04/17/2017 Stable hypodense metastatic lesions within the liver. Stable area of increased sclerosis along the right acetabulum  Bone scan 04/17/2017 Stable subtle uptake within the left proximal femoral diaphysis; No definite abnormal uptake in the region of a sclerotic lesion along the posterior column of the right acetabulum noted on CT. Stable slight uptake within the left anterior sixth rib corresponding to linear sclerosis on the recent CT, favored to be related to a fracture. Continue FOLFIRI + Avastin and repeat scans in 3 months. Cycle # 19 FOLFIRI + Avastin was on 04/18/2017. Cycle # 20 FOLFIRI + Avastin was on 05/02/2017. Cycle # 21 FOLFIRI + Avastin was on 05/16/2017. Cycle # 22 FOLFIRI + Avastin was on 05/30/2017. Cycle # 23 FOLFIRI + Avastin was on 06/13/2017. Cycle # 24 FOLFIRI + Avastin was on 06/27/2017. Cycle # 25 FOLFIRI + Avastin was on 07/11/2017. Cycle # 26 FOLFIRI + Avastin was on 07/25/2017. Cycle # 27 FOLFIRI + Avastin was on 08/08/2017. Cycle # 28 FOLFIRI + Avastin was on 08/22/2017. Cycle # 29 FOLFIRI + Avastin was on 09/05/2017. Cycle # 30 FOLFIRI + Avastin was on 09/19/2017. Bone scan 09/26/2017 stable. CT abdomen/pelvis on 09/26/2017 Stable hypodense mass in the liver. Stable sclerotic lesion in the acetabulum. CT chest 09/26/2017 negative for metastatic disease. Cycle # 31 FOLFIRI + Avastin was on 10/03/2017. CEA 7.4 on 10/03/2017. Cycle # 32 FOLFIRI + Avastin was on 10/17/2017. CEA 6.0 on 10/17/2017. Cycle # 33 FOLFIRI + Avastin was on 10/31/2017.  CEA 6.8 on MRI thoracic spine and referred to neurosurgery team.    Cycle # 64 FOLFIRI + Avastin was on 02/26/2019. CEA 4.7 on 02/25/2019. Cycle # 65 FOLFIRI + Avastin was on 03/12/2019. CEA 4.0 on 03/11/2019. Cycle # 66 FOLFIRI + Avastin was on 03/26/2019. CEA 4.7 on 03/25/2019. Cycle # 67 FOLFIRI + Avastin was on 04/09/2019. CEA 5.6 on 04/08/2019. Cycle # 68 FOLFIRI + Avastin was on 04/30/2019. CEA 4.9 on 04/29/2019. Cycle # 69 FOLFIRI + Avastin was on 05/14/2019. CEA 5.3 on 05/14/2019. CT chest 05/23/2019 stable small lung nodule with no evidence of metastatic disease. CT abdomen/pelvis 05/23/2019 Decrease conspicuity of the liver lesions. No new lesions seen. Stable splenomegaly. Continue FOLFIRI + Avastin and repeat scans in 3 months. Cycle # 70 FOLFIRI + Avastin was on 06/04/2019. CEA 4.8 on 06/03/2019. Cycle # 71 FOLFIRI + Avastin was on 06/18/2019. CEA 4.6 on 06/17/2019. Cycle # 72 FOLFIRI + Avastin was on 07/09/2019. CEA 4.3 on 07/08/2019. Cycle # 73 FOLFIRI + Avastin was on 07/30/2019. CEA 5.0 on 07/29/2019. Cycle # 74 FOLFIRI + Avastin was on 08/13/2019. CEA 5.0 on 08/12/2019. CT chest 08/20/2019 negative  CT abdomen/pelvis 08/20/2019 Previous identified hepatic lesions are not visualized on the current exam.  Continue FOLFIRI + Avastin and repeat scans in 3 months. Cycle # 75 FOLFIRI + Avastin was on 08/27/2019. CEA 5.0 on 08/26/2019. Cycle # 76 FOLFIRI + Avastin was on 09/17/2019. CEA 5.5 on 09/16/2019. Cycle # 77 FOLFIRI + Avastin was on 10/15/2019. CEA 4.6 on 10/15/2019. Cycle # 78 FOLFIRI + Avastin was on 10/29/2019. CEA 4.1 on 10/28/2019. Cycle # 79 FOLFIRI + Avastin was on 11/12/2019. CEA 4.3 on 11/12/2019. Cycle # 80 FOLFIRI + Avastin was on 12/10/2019. CEA 4.0 on 12/09/2019. CT chest 12/19/2019 Stable 3 mm nodule within the left upper lobe, similar to the previous studies dating back to 11/2/2018, however decreased in size compared to the CT from 3/26/2018. No thoracic LN.    CT abdomen/pelvis 12/19/2019 Previously described small hypodense lesions are more conspicuous on the current study compared to the recent study throughout the liver from 8/20/2019, however similar to the prior study from 2/21/2019. Findings may be related to differences in contrast opacification. No abdominal or pelvic lymphadenopathy. Continue FOLFIRI + Avastin and repeat scans in 2 months to f/u on liver lesions. Cycle # 81 FOLFIRI + Avastin was on 01/07/2020. CEA 3.8 on 01/06/2020. Cycle # 82 FOLFIRI + Avastin was on 01/21/2020. CEA 3.7 on 01/20/2020. Cycle # 83 FOLFIRI + Avastin was on 02/04/2020. CEA 4.1 on 02/03/2020. Cycle # 84 FOLFIRI + Avastin was on 02/18/2020. CEA 4.6 on 02/17/2020. EGD by Dr. Donita Rosas 03/02/2020 showing no masses or lesions  Cycle # 85 FOLFIRI + Avastin was on 03/03/2020. CEA 7.4 on 03/02/2020. Cycle # 86 FOLFIRI + Avastin was on 03/17/2020. CEA 4.5 on 03/16/2020. Colonoscopy on 03/23/2020 by Dr. Donita aguero (records requested). Cycle # 87 FOLFIRI + Avastin was on 03/31/2020. CEA 4.1 on 03/30/2020. He presented today 04/07/2020 c/o sore throat, oral lesions, ulcers decline in appetite and weight loss. Review of Systems;  CONSTITUTIONAL: No fever, chills. Decline in appetite and energy level. Losing weight  ENMT: Eyes: No diplopia; Nose: No epistaxis. Mouth: Oral thrush and ulcers. RESPIRATORY: No hemoptysis, shortness of breath. CARDIOVASCULAR: No chest pain, palpitations. GASTROINTESTINAL: No nausea/vomiting, abdominal pain. GENITOURINARY: No dysuria, urinary frequency, hematuria. NEURO: No syncope, presyncope, headache. Remainder ROS: Negative. Past Medical History:   Past Medical History               Diagnosis Date    Arthritis      Cancer ([address]         colon    Depression      Hyperlipidemia      Hypertension           Medications:  Reviewed and reconciled.     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 Mediport was placed. Cycle # 1 of FOLFOX + Avastin was on 11/23/2015. CEA was 4555 on 11/23/2015. Cycle # 2 of FOLFOX + Avastin was on 12/08/2015. CEA was 3160 on 12/08/2015. Cycle # 3 of FOLFOX + Avastin was on 12/22/2015. CEA was 2516 on 12/21/2015. Cycle # 4 of FOLFOX + Avastin was on 01/05/2016. CEA was 2098 on 01/05/2015. Cycle # 5 of FOLFOX + Avastin was on 01/19/2016. CEA was 1511 on 01/05/2015.     -Bone scan on 01/26/2016 noted no metastatic disease. CT chest on 01/26/2016 revealed Significant response to treatment with no visible residual nodules. CT scan abdomen/pelvis on 01/26/2016 noted Interval decreased size of multiple masses in the liver compatible with treatment response. Continue another 2 months of FOLFOX + Avastin and repeat scans. Cycle # 6 of FOLFOX + Avastin was on 02/02/2016.  on 02/02/2016. Cycle # 7 of FOLFOX + Avastin was on 02/16/2016.  on 02/16/2016. Cycle # 8 of FOLFOX + Avastin was on 03/01/2016.  on 03/01/2016. Cycle # 9 of FOLFOX + Avastin was on 03/15/2016.  on 03/15/2016. Cycle # 10 of FOLFOX + Avastin was on 03/29/2016. .4 on 03/29/2016. Cycle # 11 of FOLFOX + Avastin was on 04/12/2016. .4 on 04/12/2016.     Re-staging scans on 04/19/2016: CT Chest: clear lungs; no evidence of recurring pulmonary nodule; CT Abdomen/Pelvis: Further interval decrease in size of the multiple metastatic hepatic lesions, the largest lesion now measures 3.9 x 3.5 cm and previously measured 4.5 cm in maximum diameter. No mesenteric lymphadenopathy is identified; Bone Scan: No evidence of osseous metastasis. Continue same regimen and re-stage in 2-3 months. Cycle # 12 FOLFOX + Avastin was on 04/26/2016. .5 on 04/26/2016. Cycle # 13 FOLFOX + Avastin was on 05/10/2016. .3 on 05/10/2016.  Cycle # 14 FOLFOX+AVASTIN was on 05/24/2016. .5 on 05/24/2016. Cycle # 15 FOLFOX + Avastin was on 06/07/2016. CEA 90.5 on 06/07/2016. Admitted to [facility] 06/13/2016-06/16/2016 for abdominal pain: EGD noted 1.5 cm clean based duodenal bulb ulceration s/p epinephrine and bicap per Dr. Rosemary Pineda. No active bleeding. A. Stomach, biopsy: Mild chronic gastritis, immunostain negative for Helicobacter  B. Esophagus, biopsy: Gastric glandular mucosa with prominent ntestinal metaplasia ([place]'s epithelium), negative for epithelial dysplasia, esophageal squamous mucosa not identified     Cycle # 16 FOLFOX (discontinued avastin (bevacizumab) given association of peptic ulcer disease and known association of GI perforation.) was on 06/21/2016. CEA 47 on 06/21/2016. Cycle # 17 FOLFOX was on 07/05/2016. CEA 52.6 on 07/05/2016. CEA 47.6 on 07/19/2016.     Re-staging scans 07/19/2106: CT Chest negative for metastatic disease. CT Abdomen/Pelvis: Stable hepatic lesions; Question new mural thickening in the cecum. Bone Scan: New Let hip lesion suspicious for bone metastasis.     Increased CEA; new mural thickening in the cecum and new left hip lesion suspicious for bone metastasis are consistent with disease progression; He derived maximum benefit from FOLFOX/AVASTIN. D/C FOLFOX/AVASTIN. We recommended FOLFIRI second line therapy. Cycle # 1 FOLFIRI was on 08/02/2016. CEA 40.8 on 08/02/2016. Xgeva q4 weeks started on 08/02/2016. Cycle # 2 FOLFIRI was on 08/16/2016. CEA 31.7 on 08/16/2016. Cycle # 3 FOLFIRI (added Avastin) was on 08/30/2016 given that ulcers healed on EGD 08/15/2016 by Dr. Tiffani Banuelos; Protonix bid since avastin re-started. Colonoscopy on 08/29/2016 (to look at the cecal area) unremarkable. CEA 26.7 on 08/30/2016. Cycle # 4 FOLFIRI/Avastin was on 09/13/2016. CEA 23.4 on 09/13/2016. Cycle # 5 FOLFIRI/Avastin was on 09/27/2016. CEA 22.3 on 09/27/2016. Cycle # 6 FOLFIRI/Avastin was on 10/11/2016.  CEA 18.4 on 03/02/2020. Cycle # 86 FOLFIRI + Avastin was on 03/17/2020. CEA 4.5 on 03/16/2020. Colonoscopy on 03/23/2020 by Dr. Darien aguero (records requested). Cycle # 87 FOLFIRI + Avastin was on 03/31/2020. CEA 4.1 on 03/30/2020. Oral lesions, ulcers, thrush: prescribed Peridex and Nystatin. Continue magic mouthwsh, salt water gargles, and baking soda.   RTC next week for possible Cycle # 88 FOLFIRI + Avastin  MSI testing noted no mismatch repair protein loss of expression    4/7/2020  Ramiro Argueta MD  Board Certified Medical Oncologist

## 2020-04-07 NOTE — PROGRESS NOTES
Lucas Valdez  4/7/2020  Wt Readings from Last 10 Encounters:   04/07/20 183 lb 3.2 oz (83.1 kg)   03/31/20 185 lb 4.8 oz (84.1 kg)   03/17/20 198 lb 14.4 oz (90.2 kg)   03/05/20 198 lb (89.8 kg)   03/03/20 198 lb 1.6 oz (89.9 kg)   02/18/20 200 lb 1.6 oz (90.8 kg)   02/04/20 201 lb 6.4 oz (91.4 kg)   01/21/20 207 lb 8 oz (94.1 kg)   01/07/20 205 lb 12.8 oz (93.4 kg)   12/10/19 214 lb (97.1 kg)     Ht Readings from Last 1 Encounters:   04/07/20 6' 1\" (1.854 m)     Body mass index is 24.17 kg/m². Met with patient for follow up and to review patients need for regular supplementation with protein shake. He is having trouble with his appetite and has dropped 10# in the past 4-6mo. He reports that he has trouble with things tasting different, and he cannot eat a lot at once. He knows he has to force himself like he did before. He has had some diarrhea on and off. He was doing Ensure everyday, now does it here and there. Spoke with patient about increasing to twice daily, recommended Ensure Enlive, and recommended that he try it between his meals, still aiming for good meal intake as able. Patient was receptive. Spoke with him about trying to avoid greasy foods and high fiber foods that will worsen diarrhea. He is having ulcers and mucositis. Encouraged increasing to 3-4 shakes if this continues to worsen, for these are soothing on the mouth. Patient was receptive. Continue to follow. Weight change: 15# loss in 1mo (7.5%),30# loss in 4mo (14%)  Appetite: fair  N/V/D/C: diarrhea, mucositis  Calculated Needs if applicable: [id number] (83x30-32), 100-120gm PRO (83x1.30), 2500ml (83x32)    Pre-Hab Eligible?: no        Recommendations: Ensure Enlive BID to provide 700kcal, 40gm PRO; Increase to 3-4 daily if mucositis does not improve. Choose soothing, non-irritating foods; Rinse mouth with water/baking soda before and after eating.           ASP[facility] GUIDELINES FOR CLINICAL CHARACTERISTICS OF MALNUTRITION IN CHRONIC ILLNESS     Moderate Malnutrition  Severe Malnutrition    Energy intake  <75% energy intake compared to estimated needs for >1month <75% energy intake compared to estimated needs for >1month   Weight changes  5% x 1 month  7.5% x 3 months   10% x 6 months   20% x 1 year  >5% x 1 month  >7.5% x 3 months   >10% x 6 months   >20% x 1 year    Physical findings  Mild   Decrease subcutaneous fat    Decrease muscle mass     Increase fluid/edema   Severe  Decrease subcutaneous fat    Decrease muscle mass     Increase fluid/edema      Moderate-severe malnutrition evidenced by 14% weight loss in 4mo, <50% intake x2-3 weeks.     Blanquita Ball

## 2020-04-07 NOTE — PROGRESS NOTES
Ana Rice informed me that his [address] agency ([employer] ) is no longer coming into the home to attach 5FU pump due to Covid concerns. He will have to drive to [facility] to obtain 5 FU pump. He checked with his insurance and they are unwilling to allow Lanette Cruz to provide the pump. Offered to have  speak with him but he declined. Pt feels he will not be eligible for financial assistance for additional cost as we are not a provider.

## 2020-04-08 ENCOUNTER — TELEPHONE (OUTPATIENT)
Dept: ONCOLOGY | Age: 70
End: 2020-04-08

## 2020-04-08 NOTE — TELEPHONE ENCOUNTER
Everton Bower again regarding Neulasta/Udenyca, per his insurance, Neulasta has already been approved 04/12/20 to 10/12/20. Will switch to [facility] in October. Ana Rice was agreeable to this plan. Updated Nora Bragg, who verbalized understanding and updated plan.

## 2020-04-09 ENCOUNTER — CLINICAL DOCUMENTATION (OUTPATIENT)
Dept: INFUSION THERAPY | Age: 70
End: 2020-04-09

## 2020-04-09 RX ORDER — OXYCODONE HYDROCHLORIDE AND ACETAMINOPHEN 5; 325 MG/1; MG/1
1 TABLET ORAL EVERY 6 HOURS PRN
Qty: 12 TABLET | Refills: 0 | Status: SHIPPED | OUTPATIENT
Start: 2020-04-09 | End: 2020-04-12

## 2020-04-09 NOTE — PROGRESS NOTES
Mera Alvarado initially called the office March 24 complaining he has a sore throat denies fever cough but eating or drinking is very painful. Dr. Erendira Frgaa advised him to use Magic mouthwash and salt water gargles. He tried these remedies but still c/o symptoms. He called in today and Dr. Erendira Fraga wanted me to give him a 3-day supply of Percocet 5/325 and refer him back to his GI doctor Dr. Crystal Temple at [facility]. Called their office and they are going to set Mera Alvarado up with an appointment. I also called Mera Alvarado and told him he could pick a prescription up for pain medication at his pharmacy. I also told to call back if he has no improvement in his symptoms.

## 2020-04-13 ENCOUNTER — TELEPHONE (OUTPATIENT)
Dept: INFUSION THERAPY | Age: 70
End: 2020-04-13

## 2020-04-14 ENCOUNTER — APPOINTMENT (OUTPATIENT)
Dept: INFUSION THERAPY | Age: 70
End: 2020-04-14
Payer: MEDICARE

## 2020-04-14 ENCOUNTER — HOSPITAL ENCOUNTER (OUTPATIENT)
Dept: INFUSION THERAPY | Age: 70
End: 2020-04-14
Payer: MEDICARE

## 2020-04-20 ENCOUNTER — HOSPITAL ENCOUNTER (OUTPATIENT)
Age: 70
Discharge: HOME | End: 2020-04-20
Payer: MEDICARE

## 2020-04-20 LAB
ALBUMIN SERPL-MCNC: 3.6 G/DL (ref 3.5–5.2)
ALP BLD-CCNC: 172 U/L (ref 40–129)
ALT SERPL-CCNC: 20 U/L (ref 0–40)
ANION GAP SERPL CALCULATED.3IONS-SCNC: 15 MMOL/L (ref 7–16)
ANISOCYTOSIS: ABNORMAL
AST SERPL-CCNC: 37 U/L (ref 0–39)
BASOPHILS ABSOLUTE: 0.04 E9/L (ref 0–0.2)
BASOPHILS RELATIVE PERCENT: 0.9 % (ref 0–2)
BILIRUB SERPL-MCNC: 0.8 MG/DL (ref 0–1.2)
BUN BLDV-MCNC: 17 MG/DL (ref 8–23)
CALCIUM SERPL-MCNC: 8.2 MG/DL (ref 8.6–10.2)
CEA: 6.4 NG/ML (ref 0–5.2)
CHLORIDE BLD-SCNC: 106 MMOL/L (ref 98–107)
CO2: 22 MMOL/L (ref 22–29)
CREAT SERPL-MCNC: 1.1 MG/DL (ref 0.7–1.2)
EOSINOPHILS ABSOLUTE: 0.04 E9/L (ref 0.05–0.5)
EOSINOPHILS RELATIVE PERCENT: 0.9 % (ref 0–6)
GFR AFRICAN AMERICAN: >60
GFR NON-AFRICAN AMERICAN: >60 ML/MIN/1.73
GLUCOSE BLD-MCNC: 129 MG/DL (ref 74–99)
HCT VFR BLD CALC: 25.6 % (ref 37–54)
HEMOGLOBIN: 8.1 G/DL (ref 12.5–16.5)
LYMPHOCYTES ABSOLUTE: 0.46 E9/L (ref 1.5–4)
LYMPHOCYTES RELATIVE PERCENT: 9.5 % (ref 20–42)
MCH RBC QN AUTO: 32.7 PG (ref 26–35)
MCHC RBC AUTO-ENTMCNC: 31.6 % (ref 32–34.5)
MCV RBC AUTO: 103.2 FL (ref 80–99.9)
MONOCYTES ABSOLUTE: 0.28 E9/L (ref 0.1–0.95)
MONOCYTES RELATIVE PERCENT: 6 % (ref 2–12)
NEUTROPHILS ABSOLUTE: 3.82 E9/L (ref 1.8–7.3)
NEUTROPHILS RELATIVE PERCENT: 82.8 % (ref 43–80)
OVALOCYTES: ABNORMAL
PDW BLD-RTO: 21.3 FL (ref 11.5–15)
PLATELET # BLD: 120 E9/L (ref 130–450)
PMV BLD AUTO: 10.9 FL (ref 7–12)
POIKILOCYTES: ABNORMAL
POLYCHROMASIA: ABNORMAL
POTASSIUM SERPL-SCNC: 3.5 MMOL/L (ref 3.5–5)
RBC # BLD: 2.48 E12/L (ref 3.8–5.8)
SODIUM BLD-SCNC: 143 MMOL/L (ref 132–146)
TEAR DROP CELLS: ABNORMAL
TOTAL PROTEIN: 6 G/DL (ref 6.4–8.3)
WBC # BLD: 4.6 E9/L (ref 4.5–11.5)

## 2020-04-20 PROCEDURE — 80053 COMPREHEN METABOLIC PANEL: CPT

## 2020-04-20 PROCEDURE — 85025 COMPLETE CBC W/AUTO DIFF WBC: CPT

## 2020-04-20 PROCEDURE — 82378 CARCINOEMBRYONIC ANTIGEN: CPT

## 2020-04-20 PROCEDURE — 36415 COLL VENOUS BLD VENIPUNCTURE: CPT

## 2020-04-21 ENCOUNTER — CLINICAL DOCUMENTATION (OUTPATIENT)
Dept: INFUSION THERAPY | Age: 70
End: 2020-04-21

## 2020-04-21 ENCOUNTER — HOSPITAL ENCOUNTER (OUTPATIENT)
Dept: INFUSION THERAPY | Age: 70
Discharge: HOME | End: 2020-04-21
Payer: MEDICARE

## 2020-04-21 ENCOUNTER — OFFICE VISIT (OUTPATIENT)
Dept: ONCOLOGY | Age: 70
End: 2020-04-21
Payer: MEDICARE

## 2020-04-21 VITALS
SYSTOLIC BLOOD PRESSURE: 106 MMHG | HEIGHT: 73 IN | OXYGEN SATURATION: 100 % | BODY MASS INDEX: 24.55 KG/M2 | DIASTOLIC BLOOD PRESSURE: 66 MMHG | HEART RATE: 72 BPM | WEIGHT: 185.2 LBS | TEMPERATURE: 97.2 F

## 2020-04-21 VITALS — SYSTOLIC BLOOD PRESSURE: 110 MMHG | HEART RATE: 70 BPM | DIASTOLIC BLOOD PRESSURE: 66 MMHG

## 2020-04-21 DIAGNOSIS — C78.7 RECTAL CANCER METASTASIZED TO LIVER (HCC): Primary | ICD-10-CM

## 2020-04-21 DIAGNOSIS — C20 RECTAL CANCER METASTASIZED TO LIVER (HCC): Primary | ICD-10-CM

## 2020-04-21 PROCEDURE — 2580000003 HC RX 258: Performed by: INTERNAL MEDICINE

## 2020-04-21 PROCEDURE — 96415 CHEMO IV INFUSION ADDL HR: CPT

## 2020-04-21 PROCEDURE — 6360000002 HC RX W HCPCS: Performed by: INTERNAL MEDICINE

## 2020-04-21 PROCEDURE — 96413 CHEMO IV INFUSION 1 HR: CPT

## 2020-04-21 PROCEDURE — 96368 THER/DIAG CONCURRENT INF: CPT

## 2020-04-21 PROCEDURE — 96375 TX/PRO/DX INJ NEW DRUG ADDON: CPT

## 2020-04-21 PROCEDURE — 96411 CHEMO IV PUSH ADDL DRUG: CPT

## 2020-04-21 PROCEDURE — 96417 CHEMO IV INFUS EACH ADDL SEQ: CPT

## 2020-04-21 PROCEDURE — 99214 OFFICE O/P EST MOD 30 MIN: CPT | Performed by: INTERNAL MEDICINE

## 2020-04-21 RX ORDER — METHYLPREDNISOLONE SODIUM SUCCINATE 125 MG/2ML
125 INJECTION, POWDER, LYOPHILIZED, FOR SOLUTION INTRAMUSCULAR; INTRAVENOUS ONCE
Status: CANCELLED | OUTPATIENT
Start: 2020-04-21

## 2020-04-21 RX ORDER — SODIUM CHLORIDE 0.9 % (FLUSH) 0.9 %
10 SYRINGE (ML) INJECTION PRN
Status: CANCELLED | OUTPATIENT
Start: 2020-04-21

## 2020-04-21 RX ORDER — DIPHENHYDRAMINE HYDROCHLORIDE 50 MG/ML
50 INJECTION INTRAMUSCULAR; INTRAVENOUS ONCE
Status: CANCELLED | OUTPATIENT
Start: 2020-04-21

## 2020-04-21 RX ORDER — FLUOROURACIL 50 MG/ML
850 INJECTION, SOLUTION INTRAVENOUS ONCE
Status: COMPLETED | OUTPATIENT
Start: 2020-04-21 | End: 2020-04-21

## 2020-04-21 RX ORDER — SODIUM CHLORIDE 9 MG/ML
INJECTION, SOLUTION INTRAVENOUS CONTINUOUS
Status: CANCELLED | OUTPATIENT
Start: 2020-04-21

## 2020-04-21 RX ORDER — EPINEPHRINE 1 MG/ML
0.3 INJECTION, SOLUTION, CONCENTRATE INTRAVENOUS PRN
Status: CANCELLED | OUTPATIENT
Start: 2020-04-21

## 2020-04-21 RX ORDER — 0.9 % SODIUM CHLORIDE 0.9 %
10 VIAL (ML) INJECTION ONCE
Status: CANCELLED | OUTPATIENT
Start: 2020-04-21

## 2020-04-21 RX ORDER — SODIUM CHLORIDE 0.9 % (FLUSH) 0.9 %
10 SYRINGE (ML) INJECTION PRN
Status: DISCONTINUED | OUTPATIENT
Start: 2020-04-21 | End: 2020-04-22 | Stop reason: HOSPADM

## 2020-04-21 RX ORDER — SODIUM CHLORIDE 9 MG/ML
250 INJECTION, SOLUTION INTRAVENOUS CONTINUOUS
Status: CANCELLED | OUTPATIENT
Start: 2020-04-21

## 2020-04-21 RX ORDER — HEPARIN SODIUM (PORCINE) LOCK FLUSH IV SOLN 100 UNIT/ML 100 UNIT/ML
500 SOLUTION INTRAVENOUS PRN
Status: DISCONTINUED | OUTPATIENT
Start: 2020-04-21 | End: 2020-04-22 | Stop reason: HOSPADM

## 2020-04-21 RX ORDER — ATROPINE SULFATE 0.4 MG/ML
0.4 AMPUL (ML) INJECTION ONCE
Status: CANCELLED | OUTPATIENT
Start: 2020-04-21

## 2020-04-21 RX ORDER — HEPARIN SODIUM (PORCINE) LOCK FLUSH IV SOLN 100 UNIT/ML 100 UNIT/ML
500 SOLUTION INTRAVENOUS PRN
Status: CANCELLED | OUTPATIENT
Start: 2020-04-21

## 2020-04-21 RX ORDER — PALONOSETRON HYDROCHLORIDE 0.05 MG/ML
0.25 INJECTION, SOLUTION INTRAVENOUS ONCE
Status: CANCELLED | OUTPATIENT
Start: 2020-04-21

## 2020-04-21 RX ORDER — DEXAMETHASONE SODIUM PHOSPHATE 10 MG/ML
10 INJECTION INTRAMUSCULAR; INTRAVENOUS ONCE
Status: COMPLETED | OUTPATIENT
Start: 2020-04-21 | End: 2020-04-21

## 2020-04-21 RX ORDER — PALONOSETRON HYDROCHLORIDE 0.05 MG/ML
0.25 INJECTION, SOLUTION INTRAVENOUS ONCE
Status: COMPLETED | OUTPATIENT
Start: 2020-04-21 | End: 2020-04-21

## 2020-04-21 RX ORDER — FLUOROURACIL 50 MG/ML
850 INJECTION, SOLUTION INTRAVENOUS ONCE
Status: CANCELLED | OUTPATIENT
Start: 2020-04-21

## 2020-04-21 RX ORDER — ATROPINE SULFATE 0.4 MG/ML
0.4 AMPUL (ML) INJECTION ONCE
Status: COMPLETED | OUTPATIENT
Start: 2020-04-21 | End: 2020-04-21

## 2020-04-21 RX ORDER — DEXAMETHASONE SODIUM PHOSPHATE 10 MG/ML
10 INJECTION, SOLUTION INTRAMUSCULAR; INTRAVENOUS ONCE
Status: CANCELLED | OUTPATIENT
Start: 2020-04-21

## 2020-04-21 RX ORDER — SODIUM CHLORIDE 9 MG/ML
250 INJECTION, SOLUTION INTRAVENOUS CONTINUOUS
Status: DISCONTINUED | OUTPATIENT
Start: 2020-04-21 | End: 2020-04-22 | Stop reason: HOSPADM

## 2020-04-21 RX ADMIN — SODIUM CHLORIDE, PRESERVATIVE FREE 10 ML: 5 INJECTION INTRAVENOUS at 11:45

## 2020-04-21 RX ADMIN — PALONOSETRON 0.25 MG: 0.25 INJECTION, SOLUTION INTRAVENOUS at 08:49

## 2020-04-21 RX ADMIN — SODIUM CHLORIDE 400 MG: 9 INJECTION, SOLUTION INTRAVENOUS at 10:04

## 2020-04-21 RX ADMIN — FLUOROURACIL 850 MG: 50 INJECTION, SOLUTION INTRAVENOUS at 11:41

## 2020-04-21 RX ADMIN — ATROPINE SULFATE 0.4 MG: 0.4 INJECTION, SOLUTION INTRAMUSCULAR; INTRAVENOUS; SUBCUTANEOUS at 09:24

## 2020-04-21 RX ADMIN — LEUCOVORIN CALCIUM 850 MG: 10 INJECTION INTRAMUSCULAR; INTRAVENOUS at 10:04

## 2020-04-21 RX ADMIN — DEXAMETHASONE SODIUM PHOSPHATE 10 MG: 10 INJECTION INTRAMUSCULAR; INTRAVENOUS at 08:53

## 2020-04-21 RX ADMIN — Medication 500 UNITS: at 11:45

## 2020-04-21 RX ADMIN — SODIUM CHLORIDE 250 ML: 9 INJECTION, SOLUTION INTRAVENOUS at 08:49

## 2020-04-21 RX ADMIN — BEVACIZUMAB 400 MG: 400 INJECTION, SOLUTION INTRAVENOUS at 09:28

## 2020-04-21 NOTE — PROGRESS NOTES
3488. Bone scan on 11/10/2015 noted no metastatic disease. CT chest on 11/10/2015 revealed Multiple pulmonary nodules in the upper and lower lobes consistent with metastatic disease;   Hepatic metastasis also visualized. 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 Mediport was placed. Cycle # 1 of FOLFOX + Avastin was on 11/23/2015. CEA was 4555 on 11/23/2015. Cycle # 2 of FOLFOX + Avastin was on 12/08/2015. CEA was 3160 on 12/08/2015. Cycle # 3 of FOLFOX + Avastin was on 12/22/2015. CEA was 2516 on 12/21/2015. Cycle # 4 of FOLFOX + Avastin was on 01/05/2016. CEA was 2098 on 01/05/2015. Cycle # 5 of FOLFOX + Avastin was on 01/19/2016. CEA was 1511 on 01/05/2015.     -Bone scan on 01/26/2016 noted no metastatic disease. CT chest on 01/26/2016 revealed Significant response to treatment with no visible residual nodules. CT scan abdomen/pelvis on 01/26/2016 noted Interval decreased size of multiple masses in the liver compatible with treatment response. Continue another 2 months of FOLFOX + Avastin and repeat scans. Cycle # 6 of FOLFOX + Avastin was on 02/02/2016.  on 02/02/2016. Cycle # 7 of FOLFOX + Avastin was on 02/16/2016.  on 02/16/2016. Cycle # 8 of FOLFOX + Avastin was on 03/01/2016.  on 03/01/2016. Cycle # 9 of FOLFOX + Avastin was on 03/15/2016.  on 03/15/2016. Cycle # 10 of FOLFOX + Avastin was on 03/29/2016. .4 on 03/29/2016. Cycle # 11 of FOLFOX + Avastin was on 04/12/2016. .4 on 04/12/2016.     Re-staging scans on 04/19/2016: CT Chest: clear lungs; no evidence of recurring pulmonary nodule; CT Abdomen/Pelvis: Further interval decrease in size of the multiple metastatic hepatic lesions, the largest lesion now measures 3.9 x 3.5 cm and previously measured 4.5 cm in maximum diameter. No mesenteric lymphadenopathy is identified; Bone Scan: No evidence of osseous metastasis.   Continue same regimen and on 08/30/2016. Cycle # 4 FOLFIRI/Avastin was on 09/13/2016. CEA 23.4 on 09/13/2016. Cycle # 5 FOLFIRI/Avastin was on 09/27/2016. CEA 22.3 on 09/27/2016. Cycle # 6 FOLFIRI/Avastin was on 10/11/2016. CEA 18.4 on 10/11/2016.      Bone scan 10/21/2016 stable bone metastasis; ? New lesion anterior left sixth rib likely interval healing fracture. CT abdomen/pelvis revealed stable hepatic metastasis. CT chest negative for metastatic disease. Continue FOLFIRI/Avastin and repeat scans in 3 months. Cycle # 7 FOLFIRI/Avastin was on 10/25/2016. CEA 16.1  Cycle # 8 FOLFIRI/Avastin was on 11/08/2016. CEA 15.7  Cycle # 9 FOLFIRI/Avastin was on 11/29/2016. CEA 13.6 on 11/29/2016. Cycle # 10 FOLFIRI/Avastin was on 12/13/2016. CEA 10.6 on 12/13/2016. Cycle # 11 FOLFIRI/Avastin was on 12/27/2016. CEA 11.1 on 12/27/2016. Cycle # 12 FOLFIRI/Avastin was on 01/10/2017. CEA 9.7 on 01/10/2017.       CT chest 01/23/2017 No new pulmonary nodule or lymphadenopathy. Patchy groundglass opacities within the left lower lobe, suggestive of infectious or inflammatory etiology. Followup CT chest in 3 months. Slight increased linear sclerosis along the left anterior sixth rib corresponding to an area of increased uptake on the prior bone scan from 10/21/2016, favored to be related to interval healing changes of a nondisplaced fracture. CT abdomen/pelvis on 01/23/2017 Redemonstration of multiple hepatic metastases, which are similar compared to the prior CT from 10/21/2016. Redemonstration of area of increased sclerosis along the right acetabulum suspicious for metastatic disease. Bone scan on 01/23/2017 Stable subtle uptake within the left proximal diaphysis, again suggestive of metastatic disease  Decreased subtle uptake within the medial right acetabulum. Decreased uptake within the left anterior sixth rib corresponding to linear sclerosis on the recent CT, favored to be related to an joint rib fracture.     Continue FOLFIRI + Avastin and repeat scans in 3 months. Cycle # 13 FOLFIRI + Avastin was on 01/24/2017. Cycle # 14 FOLFIRI + Avastin was on 02/07/2017. Cycle # 15 FOLFIRI + Avastin was on 02/21/2017. Cycle # 16 FOLFIRI + Avastin was on 03/07/2017. Cycle # 17 FOLFIRI + Avastin was on 03/21/2017. Cycle # 18 FOLFIRI + Avastin was on 04/04/2017. CT chest 04/17/2017 negative for metastatic disease. CT abdomen/pelvis 04/17/2017 Stable hypodense metastatic lesions within the liver. Stable area of increased sclerosis along the right acetabulum  Bone scan 04/17/2017 Stable subtle uptake within the left proximal femoral diaphysis; No definite abnormal uptake in the region of a sclerotic lesion along the posterior column of the right acetabulum noted on CT. Stable slight uptake within the left anterior sixth rib corresponding to linear sclerosis on the recent CT, favored to be related to a fracture. Continue FOLFIRI + Avastin and repeat scans in 3 months. Cycle # 19 FOLFIRI + Avastin was on 04/18/2017. Cycle # 20 FOLFIRI + Avastin was on 05/02/2017. Cycle # 21 FOLFIRI + Avastin was on 05/16/2017. Cycle # 22 FOLFIRI + Avastin was on 05/30/2017. Cycle # 23 FOLFIRI + Avastin was on 06/13/2017. Cycle # 24 FOLFIRI + Avastin was on 06/27/2017. Cycle # 25 FOLFIRI + Avastin was on 07/11/2017. Cycle # 26 FOLFIRI + Avastin was on 07/25/2017. Cycle # 27 FOLFIRI + Avastin was on 08/08/2017. Cycle # 28 FOLFIRI + Avastin was on 08/22/2017. Cycle # 29 FOLFIRI + Avastin was on 09/05/2017. Cycle # 30 FOLFIRI + Avastin was on 09/19/2017. Bone scan 09/26/2017 stable. CT abdomen/pelvis on 09/26/2017 Stable hypodense mass in the liver. Stable sclerotic lesion in the acetabulum. CT chest 09/26/2017 negative for metastatic disease. Cycle # 31 FOLFIRI + Avastin was on 10/03/2017. CEA 7.4 on 10/03/2017. Cycle # 32 FOLFIRI + Avastin was on 10/17/2017. CEA 6.0 on 10/17/2017. Cycle # 33 FOLFIRI + Avastin was on 10/31/2017.  CEA 6.8 on MRI thoracic spine and referred to neurosurgery team.    Cycle # 64 FOLFIRI + Avastin was on 02/26/2019. CEA 4.7 on 02/25/2019. Cycle # 65 FOLFIRI + Avastin was on 03/12/2019. CEA 4.0 on 03/11/2019. Cycle # 66 FOLFIRI + Avastin was on 03/26/2019. CEA 4.7 on 03/25/2019. Cycle # 67 FOLFIRI + Avastin was on 04/09/2019. CEA 5.6 on 04/08/2019. Cycle # 68 FOLFIRI + Avastin was on 04/30/2019. CEA 4.9 on 04/29/2019. Cycle # 69 FOLFIRI + Avastin was on 05/14/2019. CEA 5.3 on 05/14/2019. CT chest 05/23/2019 stable small lung nodule with no evidence of metastatic disease. CT abdomen/pelvis 05/23/2019 Decrease conspicuity of the liver lesions. No new lesions seen. Stable splenomegaly. Continue FOLFIRI + Avastin and repeat scans in 3 months. Cycle # 70 FOLFIRI + Avastin was on 06/04/2019. CEA 4.8 on 06/03/2019. Cycle # 71 FOLFIRI + Avastin was on 06/18/2019. CEA 4.6 on 06/17/2019. Cycle # 72 FOLFIRI + Avastin was on 07/09/2019. CEA 4.3 on 07/08/2019. Cycle # 73 FOLFIRI + Avastin was on 07/30/2019. CEA 5.0 on 07/29/2019. Cycle # 74 FOLFIRI + Avastin was on 08/13/2019. CEA 5.0 on 08/12/2019. CT chest 08/20/2019 negative  CT abdomen/pelvis 08/20/2019 Previous identified hepatic lesions are not visualized on the current exam.  Continue FOLFIRI + Avastin and repeat scans in 3 months. Cycle # 75 FOLFIRI + Avastin was on 08/27/2019. CEA 5.0 on 08/26/2019. Cycle # 76 FOLFIRI + Avastin was on 09/17/2019. CEA 5.5 on 09/16/2019. Cycle # 77 FOLFIRI + Avastin was on 10/15/2019. CEA 4.6 on 10/15/2019. Cycle # 78 FOLFIRI + Avastin was on 10/29/2019. CEA 4.1 on 10/28/2019. Cycle # 79 FOLFIRI + Avastin was on 11/12/2019. CEA 4.3 on 11/12/2019. Cycle # 80 FOLFIRI + Avastin was on 12/10/2019. CEA 4.0 on 12/09/2019. CT chest 12/19/2019 Stable 3 mm nodule within the left upper lobe, similar to the previous studies dating back to 11/2/2018, however decreased in size compared to the CT from 3/26/2018. No thoracic LN.    CT abdomen/pelvis 12/19/2019 Previously described small hypodense lesions are more conspicuous on the current study compared to the recent study throughout the liver from 8/20/2019, however similar to the prior study from 2/21/2019. Findings may be related to differences in contrast opacification. No abdominal or pelvic lymphadenopathy. Continue FOLFIRI + Avastin and repeat scans in 2 months to f/u on liver lesions. Cycle # 81 FOLFIRI + Avastin was on 01/07/2020. CEA 3.8 on 01/06/2020. Cycle # 82 FOLFIRI + Avastin was on 01/21/2020. CEA 3.7 on 01/20/2020. Cycle # 83 FOLFIRI + Avastin was on 02/04/2020. CEA 4.1 on 02/03/2020. Cycle # 84 FOLFIRI + Avastin was on 02/18/2020. CEA 4.6 on 02/17/2020. EGD by Dr. Susie Boswell 03/02/2020 showing no masses or lesions  Cycle # 85 FOLFIRI + Avastin was on 03/03/2020. CEA 7.4 on 03/02/2020. Cycle # 86 FOLFIRI + Avastin was on 03/17/2020. CEA 4.5 on 03/16/2020. Colonoscopy on 03/23/2020 by Dr. Susie Boswell unremarkable (records requested). Cycle # 87 FOLFIRI + Avastin was on 03/31/2020. CEA 4.1 on 03/30/2020. He presented today 04/21/2020 Cycle # 88 FOLFIRI + Avastin. Oral ulcers improved. No fever, chills. No chest pain or SOB. No nausea or vomiting. No diarrhea. Review of Systems;  CONSTITUTIONAL: No fever, chills. Fair appetite and energy level. ENMT: Eyes: No diplopia; Nose: No epistaxis. Mouth: Oral ulcers improved. RESPIRATORY: No hemoptysis, shortness of breath. CARDIOVASCULAR: No chest pain, palpitations. GASTROINTESTINAL: No nausea/vomiting, abdominal pain. GENITOURINARY: No dysuria, urinary frequency, hematuria. NEURO: No syncope, presyncope, headache. Remainder ROS: Negative. Past Medical History:   Past Medical History               Diagnosis Date    Arthritis      Cancer ([address]         colon    Depression      Hyperlipidemia      Hypertension           Medications:  Reviewed and reconciled.     Allergies:        Allergies   Allergen nodules in the upper and lower lobes consistent with metastatic disease; Hepatic metastasis also visualized. 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 Mediport was placed. Cycle # 1 of FOLFOX + Avastin was on 11/23/2015. CEA was 4555 on 11/23/2015. Cycle # 2 of FOLFOX + Avastin was on 12/08/2015. CEA was 3160 on 12/08/2015. Cycle # 3 of FOLFOX + Avastin was on 12/22/2015. CEA was 2516 on 12/21/2015. Cycle # 4 of FOLFOX + Avastin was on 01/05/2016. CEA was 2098 on 01/05/2015. Cycle # 5 of FOLFOX + Avastin was on 01/19/2016. CEA was 1511 on 01/05/2015.     -Bone scan on 01/26/2016 noted no metastatic disease. CT chest on 01/26/2016 revealed Significant response to treatment with no visible residual nodules. CT scan abdomen/pelvis on 01/26/2016 noted Interval decreased size of multiple masses in the liver compatible with treatment response. Continue another 2 months of FOLFOX + Avastin and repeat scans. Cycle # 6 of FOLFOX + Avastin was on 02/02/2016.  on 02/02/2016. Cycle # 7 of FOLFOX + Avastin was on 02/16/2016.  on 02/16/2016. Cycle # 8 of FOLFOX + Avastin was on 03/01/2016.  on 03/01/2016. Cycle # 9 of FOLFOX + Avastin was on 03/15/2016.  on 03/15/2016. Cycle # 10 of FOLFOX + Avastin was on 03/29/2016. .4 on 03/29/2016. Cycle # 11 of FOLFOX + Avastin was on 04/12/2016. .4 on 04/12/2016.     Re-staging scans on 04/19/2016: CT Chest: clear lungs; no evidence of recurring pulmonary nodule; CT Abdomen/Pelvis: Further interval decrease in size of the multiple metastatic hepatic lesions, the largest lesion now measures 3.9 x 3.5 cm and previously measured 4.5 cm in maximum diameter. No mesenteric lymphadenopathy is identified; Bone Scan: No evidence of osseous metastasis. Continue same regimen and re-stage in 2-3 months. Cycle # 12 FOLFOX + Avastin was on 04/26/2016. .5 on 04/26/2016.  Cycle # 13 FOLFOX + Avastin was on 05/10/2016. .3 on 05/10/2016. Cycle # 14 FOLFOX+AVASTIN was on 05/24/2016. .5 on 05/24/2016. Cycle # 15 FOLFOX + Avastin was on 06/07/2016. CEA 90.5 on 06/07/2016. Admitted to [facility] 06/13/2016-06/16/2016 for abdominal pain: EGD noted 1.5 cm clean based duodenal bulb ulceration s/p epinephrine and bicap per Dr. Jessica Julien. No active bleeding. A. Stomach, biopsy: Mild chronic gastritis, immunostain negative for Helicobacter  B. Esophagus, biopsy: Gastric glandular mucosa with prominent ntestinal metaplasia ([place]'s epithelium), negative for epithelial dysplasia, esophageal squamous mucosa not identified     Cycle # 16 FOLFOX (discontinued avastin (bevacizumab) given association of peptic ulcer disease and known association of GI perforation.) was on 06/21/2016. CEA 47 on 06/21/2016. Cycle # 17 FOLFOX was on 07/05/2016. CEA 52.6 on 07/05/2016. CEA 47.6 on 07/19/2016.     Re-staging scans 07/19/2106: CT Chest negative for metastatic disease. CT Abdomen/Pelvis: Stable hepatic lesions; Question new mural thickening in the cecum. Bone Scan: New Let hip lesion suspicious for bone metastasis.     Increased CEA; new mural thickening in the cecum and new left hip lesion suspicious for bone metastasis are consistent with disease progression; He derived maximum benefit from FOLFOX/AVASTIN. D/C FOLFOX/AVASTIN. We recommended FOLFIRI second line therapy. Cycle # 1 FOLFIRI was on 08/02/2016. CEA 40.8 on 08/02/2016. Xgeva q4 weeks started on 08/02/2016. Cycle # 2 FOLFIRI was on 08/16/2016. CEA 31.7 on 08/16/2016. Cycle # 3 FOLFIRI (added Avastin) was on 08/30/2016 given that ulcers healed on EGD 08/15/2016 by Dr. Roberth Francois; Protonix bid since avastin re-started. Colonoscopy on 08/29/2016 (to look at the cecal area) unremarkable. CEA 26.7 on 08/30/2016. Cycle # 4 FOLFIRI/Avastin was on 09/13/2016. CEA 23.4 on 09/13/2016. Cycle # 5 FOLFIRI/Avastin was on 09/27/2016. CEA 22.3 on 09/27/2016. Cycle # 6 FOLFIRI/Avastin was on 10/11/2016. CEA 18.4 on 10/11/2016.      Bone scan 10/21/2016 stable bone metastasis; ? New lesion anterior left sixth rib likely interval healing fracture. CT abdomen/pelvis revealed stable hepatic metastasis. CT chest negative for metastatic disease. Continue FOLFIRI/Avastin and repeat scans in 3 months. Cycle # 7 FOLFIRI/Avastin was on 10/25/2016. CEA 16.1 on 10/25/2016. Cycle # 8 FOLFIRI/Avastin was on 11/08/2016. CEA 15.7 on 11/08/2016. Cycle # 9 FOLFIRI/Avastin was on 11/29/2016. CEA 13.6 on 11/29/2016. Cycle # 10 FOLFIRI/Avastin was on 12/13/2016. CEA 10.6 on 12/13/2016. Cycle # 11 FOLFIRI/Avastin was on 12/27/2016. CEA 11.1 on 12/27/2016. Cycle # 12 FOLFIRI/Avastin was on 01/10/2017. CEA 9.7 on 01/10/2017. CT chest 01/23/2017 No new pulmonary nodule or lymphadenopathy. Patchy groundglass opacities within the left lower lobe, suggestive of infectious or inflammatory etiology. Followup CT chest in 3 months. Slight increased linear sclerosis along the left anterior sixth rib corresponding to an area of increased uptake on the prior bone scan from 10/21/2016, favored to be related to interval healing changes of a nondisplaced fracture. CT abdomen/pelvis on 01/23/2017 Redemonstration of multiple hepatic metastases, which are similar compared to the prior CT from 10/21/2016. Redemonstration of area of increased sclerosis along the right acetabulum suspicious for metastatic disease. Bone scan on 01/23/2017 Stable subtle uptake within the left proximal diaphysis, again suggestive of metastatic disease  Decreased subtle uptake within the medial right acetabulum. Decreased uptake within the left anterior sixth rib corresponding to linear sclerosis on the recent CT, favored to be related to an joint rib fracture. Continue FOLFIRI + Avastin and repeat scans in 3 months. Cycle # 13 FOLFIRI + Avastin was on 01/24/2017.   Cycle # 14 FOLFIRI + Avastin was on 09/19/2017. CEA 6.3 on 09/19/2017. Bone scan 09/26/2017 stable. CT abdomen/pelvis on 09/26/2017 Stable hypodense mass in the liver. Stable sclerotic lesion in the acetabulum. CT chest 09/26/2017 negative for metastatic disease. Cycle # 31 FOLFIRI + Avastin was on 10/03/2017. CEA 7.4 on 10/03/2017. Cycle # 32 FOLFIRI + Avastin was on 10/17/2017. CEA 6.0 on 10/17/2017. Cycle # 33 FOLFIRI + Avastin was on 10/31/2017. CEA 6.8 on 10/31/2017. Cycle # 34 FOLFIRI + Avastin was on 11/14/2017. CEA 7.2 on 11/14/2017. Cycle # 35 FOLFIRI + Avastin was on 11/28/2017. CEA 5.1 on 11/28/2017. Cycle # 36 FOLFIRI + Avastin was on 12/12/2017. CEA 6.1 on 12/12/2017. Bone scan 12/22/2017 noted no evidence of metastatic disease to the axial or appendicular skeleton. CT chest 12/22/2017 noted no evidence of metastatic disease. CT abdomen/pelvis 12/22/2017 noted stable hypodense lesions in the liver. Continue FOLFIRI + Avastin and repeat scans in 3 months. Cycle # 37 FOLFIRI + Avastin was on 12/27/2018. CEA 6.7 on 12/27/2017. Cycle # 38 FOLFIRI + Avastin was on 01/09/2018. CEA 5.0 on 01/09/2018. Cycle # 39 FOLFIRI + Avastin was on 01/23/2018. CEA 6.5 on 01/23/2018. Cycle # 40 FOLFIRI + Avastin was on 02/06/2018. CEA 6.9 on 02/06/2018. Cycle # 41 FOLFIRI + Avastin was on 02/20/2018. CEA 6.4 on 02/20/2018. Cycle # 42 FOLFIRI + Avastin was on 03/06/2018. CEA 6.6 on 03/06/2018. Cycle # 43 FOLFIRI + Avastin was on 03/20/2018. CEA 5.7 on 03/20/2018. Bone scan on 03/26/2018 negative for metastatic disease. CT chest 03/26/2018 noted ? new 7 mm nodule in LUCY. CT abdomen/pelvis 03/26/2018 noted stable hypodense lesions in the liver. ? New small ascites in the abdomen. Patient wants to continue to monitor the lung finding and repeat scans in 2 months instead of changing the current chemotherapy regimen to oral Lonsurf. Cycle # 44 FOLFIRI + Avastin was on 04/03/2018. CEA 6.3 on 04/03/2018.    Cycle # 45 FOLFIRI + Avastin was 08/20/2019 negative  CT abdomen/pelvis 08/20/2019 Previous identified hepatic lesions are not visualized on the current exam.  Continue FOLFIRI + Avastin and repeat scans in 3 months. Cycle # 75 FOLFIRI + Avastin was on 08/27/2019. CEA 5.0 on 08/26/2019. Cycle # 76 FOLFIRI + Avastin was on 09/17/2019. CEA 5.5 on 09/16/2019. Cycle # 77 FOLFIRI + Avastin was on 10/15/2019. CEA 4.6 on 10/15/2019. Cycle # 78 FOLFIRI + Avastin was on 10/29/2019. CEA 4.1 on 10/28/2019. Cycle # 79 FOLFIRI + Avastin was on 11/12/2019. CEA 4.3 on 11/12/2019. Cycle # 80 FOLFIRI + Avastin was on 12/10/2019. CEA 4.0 on 12/09/2019. CT chest 12/19/2019 Stable 3 mm nodule within the left upper lobe, similar to the previous studies dating back to 11/2/2018, however decreased in size compared to the CT from 3/26/2018. No thoracic LN. CT abdomen/pelvis 12/19/2019 Previously described small hypodense lesions are more conspicuous on the current study compared to the recent study throughout the liver from 8/20/2019, however similar to the prior study from 2/21/2019. Findings may be related to differences in contrast opacification. No abdominal or pelvic lymphadenopathy. Continue FOLFIRI + Avastin and repeat scans in 2 months to f/u on liver lesions. Cycle # 81 FOLFIRI + Avastin was on 01/07/2020. CEA 3.8 on 01/06/2020. Cycle # 82 FOLFIRI + Avastin was on 01/21/2020. CEA 3.7 on 01/20/2020. Cycle # 83 FOLFIRI + Avastin was on 02/04/2020. CEA 4.1 on 02/03/2020. Cycle # 84 FOLFIRI + Avastin was on 02/18/2020. CEA 4.6 on 02/17/2020. CT chest 2/28/2020 no evidence of metastatic disease to the lungs and no mediastinal or hilar adenopathy. CT abdomen pelvis 2/28/2020 no enhancing lesions seen within the liver to suggest metastatic disease. Wall thickening of the rectosigmoid junction. Images reviewed. Continue FOLFIRI Avastin and repeat scans in 3 months  EGD by Dr. Marc Johnston 03/02/2020 showing no masses or lesions.   Cycle # 85

## 2020-04-21 NOTE — PROGRESS NOTES
Chemotherapy pump orders faxed to [facility] @ [phone] and call placed to [phone] and spoke with Thaddeus Florence (nurse) to confirm receipt. The pump will be administered in the home and de accessed in 46 hrs. Encouraged to call with questions/concerns.

## 2020-04-23 ENCOUNTER — HOSPITAL ENCOUNTER (OUTPATIENT)
Dept: INFUSION THERAPY | Age: 70
Discharge: HOME | End: 2020-04-23
Payer: MEDICARE

## 2020-04-23 DIAGNOSIS — C78.7 RECTAL CANCER METASTASIZED TO LIVER (HCC): ICD-10-CM

## 2020-04-23 DIAGNOSIS — C20 RECTAL CANCER METASTASIZED TO LIVER (HCC): ICD-10-CM

## 2020-04-23 DIAGNOSIS — Z51.11 ENCOUNTER FOR ANTINEOPLASTIC CHEMOTHERAPY: Primary | ICD-10-CM

## 2020-04-23 PROCEDURE — 96372 THER/PROPH/DIAG INJ SC/IM: CPT

## 2020-04-23 PROCEDURE — 6360000002 HC RX W HCPCS: Performed by: INTERNAL MEDICINE

## 2020-04-23 RX ADMIN — PEGFILGRASTIM 6 MG: 6 INJECTION SUBCUTANEOUS at 12:33

## 2020-05-04 ENCOUNTER — HOSPITAL ENCOUNTER (OUTPATIENT)
Age: 70
Discharge: HOME | End: 2020-05-04
Payer: MEDICARE

## 2020-05-04 LAB
ALBUMIN SERPL-MCNC: 3.6 G/DL (ref 3.5–5.2)
ALP BLD-CCNC: 181 U/L (ref 40–129)
ALT SERPL-CCNC: 23 U/L (ref 0–40)
ANION GAP SERPL CALCULATED.3IONS-SCNC: 13 MMOL/L (ref 7–16)
ANISOCYTOSIS: ABNORMAL
AST SERPL-CCNC: 31 U/L (ref 0–39)
BASOPHILS ABSOLUTE: 0.02 E9/L (ref 0–0.2)
BASOPHILS RELATIVE PERCENT: 0.9 % (ref 0–2)
BILIRUB SERPL-MCNC: 0.9 MG/DL (ref 0–1.2)
BUN BLDV-MCNC: 12 MG/DL (ref 8–23)
CALCIUM SERPL-MCNC: 8 MG/DL (ref 8.6–10.2)
CEA: 5.8 NG/ML (ref 0–5.2)
CHLORIDE BLD-SCNC: 106 MMOL/L (ref 98–107)
CO2: 23 MMOL/L (ref 22–29)
CREAT SERPL-MCNC: 1 MG/DL (ref 0.7–1.2)
DOHLE BODIES: ABNORMAL
EOSINOPHILS ABSOLUTE: 0.07 E9/L (ref 0.05–0.5)
EOSINOPHILS RELATIVE PERCENT: 4.3 % (ref 0–6)
GFR AFRICAN AMERICAN: >60
GFR NON-AFRICAN AMERICAN: >60 ML/MIN/1.73
GLUCOSE BLD-MCNC: 134 MG/DL (ref 74–99)
HCT VFR BLD CALC: 23.4 % (ref 37–54)
HEMOGLOBIN: 7.3 G/DL (ref 12.5–16.5)
HYPOCHROMIA: ABNORMAL
LYMPHOCYTES ABSOLUTE: 0.32 E9/L (ref 1.5–4)
LYMPHOCYTES RELATIVE PERCENT: 19.1 % (ref 20–42)
MCH RBC QN AUTO: 32.7 PG (ref 26–35)
MCHC RBC AUTO-ENTMCNC: 31.2 % (ref 32–34.5)
MCV RBC AUTO: 104.9 FL (ref 80–99.9)
MONOCYTES ABSOLUTE: 0.08 E9/L (ref 0.1–0.95)
MONOCYTES RELATIVE PERCENT: 5.2 % (ref 2–12)
NEUTROPHILS ABSOLUTE: 1.19 E9/L (ref 1.8–7.3)
NEUTROPHILS RELATIVE PERCENT: 70.4 % (ref 43–80)
OVALOCYTES: ABNORMAL
PDW BLD-RTO: 19.9 FL (ref 11.5–15)
PLATELET # BLD: 77 E9/L (ref 130–450)
PLATELET CONFIRMATION: NORMAL
PMV BLD AUTO: 12 FL (ref 7–12)
POIKILOCYTES: ABNORMAL
POLYCHROMASIA: ABNORMAL
POTASSIUM SERPL-SCNC: 3.6 MMOL/L (ref 3.5–5)
RBC # BLD: 2.23 E12/L (ref 3.8–5.8)
SCHISTOCYTES: ABNORMAL
SODIUM BLD-SCNC: 142 MMOL/L (ref 132–146)
TEAR DROP CELLS: ABNORMAL
TOTAL PROTEIN: 6 G/DL (ref 6.4–8.3)
TOXIC GRANULATION: ABNORMAL
VACUOLATED NEUTROPHILS: ABNORMAL
WBC # BLD: 1.7 E9/L (ref 4.5–11.5)

## 2020-05-04 PROCEDURE — 80053 COMPREHEN METABOLIC PANEL: CPT

## 2020-05-04 PROCEDURE — 36415 COLL VENOUS BLD VENIPUNCTURE: CPT

## 2020-05-04 PROCEDURE — 85025 COMPLETE CBC W/AUTO DIFF WBC: CPT

## 2020-05-04 PROCEDURE — 82378 CARCINOEMBRYONIC ANTIGEN: CPT

## 2020-05-05 ENCOUNTER — OFFICE VISIT (OUTPATIENT)
Dept: ONCOLOGY | Age: 70
End: 2020-05-05
Payer: MEDICARE

## 2020-05-05 ENCOUNTER — HOSPITAL ENCOUNTER (OUTPATIENT)
Dept: INFUSION THERAPY | Age: 70
Discharge: HOME | End: 2020-05-05
Payer: MEDICARE

## 2020-05-05 VITALS
OXYGEN SATURATION: 100 % | DIASTOLIC BLOOD PRESSURE: 77 MMHG | HEART RATE: 72 BPM | TEMPERATURE: 96.9 F | WEIGHT: 186.7 LBS | SYSTOLIC BLOOD PRESSURE: 129 MMHG | BODY MASS INDEX: 24.75 KG/M2 | HEIGHT: 73 IN

## 2020-05-05 DIAGNOSIS — C78.7 RECTAL CANCER METASTASIZED TO LIVER (HCC): Primary | ICD-10-CM

## 2020-05-05 DIAGNOSIS — C20 RECTAL CANCER METASTASIZED TO LIVER (HCC): Primary | ICD-10-CM

## 2020-05-05 PROCEDURE — 96375 TX/PRO/DX INJ NEW DRUG ADDON: CPT

## 2020-05-05 PROCEDURE — 96413 CHEMO IV INFUSION 1 HR: CPT

## 2020-05-05 PROCEDURE — 96368 THER/DIAG CONCURRENT INF: CPT

## 2020-05-05 PROCEDURE — 96417 CHEMO IV INFUS EACH ADDL SEQ: CPT

## 2020-05-05 PROCEDURE — 2580000003 HC RX 258: Performed by: NURSE PRACTITIONER

## 2020-05-05 PROCEDURE — 6360000002 HC RX W HCPCS: Performed by: NURSE PRACTITIONER

## 2020-05-05 PROCEDURE — 99214 OFFICE O/P EST MOD 30 MIN: CPT | Performed by: INTERNAL MEDICINE

## 2020-05-05 PROCEDURE — 96415 CHEMO IV INFUSION ADDL HR: CPT

## 2020-05-05 PROCEDURE — 96411 CHEMO IV PUSH ADDL DRUG: CPT

## 2020-05-05 RX ORDER — SODIUM CHLORIDE 9 MG/ML
250 INJECTION, SOLUTION INTRAVENOUS CONTINUOUS
Status: DISCONTINUED | OUTPATIENT
Start: 2020-05-05 | End: 2020-05-06 | Stop reason: HOSPADM

## 2020-05-05 RX ORDER — PALONOSETRON HYDROCHLORIDE 0.05 MG/ML
0.25 INJECTION, SOLUTION INTRAVENOUS ONCE
Status: CANCELLED | OUTPATIENT
Start: 2020-05-05

## 2020-05-05 RX ORDER — HEPARIN SODIUM (PORCINE) LOCK FLUSH IV SOLN 100 UNIT/ML 100 UNIT/ML
500 SOLUTION INTRAVENOUS PRN
Status: DISCONTINUED | OUTPATIENT
Start: 2020-05-05 | End: 2020-05-06 | Stop reason: HOSPADM

## 2020-05-05 RX ORDER — FLUOROURACIL 50 MG/ML
850 INJECTION, SOLUTION INTRAVENOUS ONCE
Status: COMPLETED | OUTPATIENT
Start: 2020-05-05 | End: 2020-05-05

## 2020-05-05 RX ORDER — ATROPINE SULFATE 0.4 MG/ML
0.4 AMPUL (ML) INJECTION ONCE
Status: COMPLETED | OUTPATIENT
Start: 2020-05-05 | End: 2020-05-05

## 2020-05-05 RX ORDER — SODIUM CHLORIDE 0.9 % (FLUSH) 0.9 %
10 SYRINGE (ML) INJECTION PRN
Status: CANCELLED | OUTPATIENT
Start: 2020-05-05

## 2020-05-05 RX ORDER — ATROPINE SULFATE 0.4 MG/ML
0.4 AMPUL (ML) INJECTION ONCE
Status: CANCELLED | OUTPATIENT
Start: 2020-05-05

## 2020-05-05 RX ORDER — FLUOROURACIL 50 MG/ML
850 INJECTION, SOLUTION INTRAVENOUS ONCE
Status: CANCELLED | OUTPATIENT
Start: 2020-05-05

## 2020-05-05 RX ORDER — SODIUM CHLORIDE 0.9 % (FLUSH) 0.9 %
10 SYRINGE (ML) INJECTION PRN
Status: DISCONTINUED | OUTPATIENT
Start: 2020-05-05 | End: 2020-05-06 | Stop reason: HOSPADM

## 2020-05-05 RX ORDER — DEXAMETHASONE SODIUM PHOSPHATE 10 MG/ML
10 INJECTION INTRAMUSCULAR; INTRAVENOUS ONCE
Status: CANCELLED | OUTPATIENT
Start: 2020-05-05

## 2020-05-05 RX ORDER — 0.9 % SODIUM CHLORIDE 0.9 %
10 VIAL (ML) INJECTION ONCE
Status: CANCELLED | OUTPATIENT
Start: 2020-05-05

## 2020-05-05 RX ORDER — DEXAMETHASONE SODIUM PHOSPHATE 10 MG/ML
10 INJECTION INTRAMUSCULAR; INTRAVENOUS ONCE
Status: COMPLETED | OUTPATIENT
Start: 2020-05-05 | End: 2020-05-05

## 2020-05-05 RX ORDER — HEPARIN SODIUM (PORCINE) LOCK FLUSH IV SOLN 100 UNIT/ML 100 UNIT/ML
500 SOLUTION INTRAVENOUS PRN
Status: CANCELLED | OUTPATIENT
Start: 2020-05-05

## 2020-05-05 RX ORDER — METHYLPREDNISOLONE SODIUM SUCCINATE 125 MG/2ML
125 INJECTION, POWDER, LYOPHILIZED, FOR SOLUTION INTRAMUSCULAR; INTRAVENOUS ONCE
Status: CANCELLED | OUTPATIENT
Start: 2020-05-05

## 2020-05-05 RX ORDER — SODIUM CHLORIDE 9 MG/ML
INJECTION, SOLUTION INTRAVENOUS CONTINUOUS
Status: CANCELLED | OUTPATIENT
Start: 2020-05-05

## 2020-05-05 RX ORDER — SODIUM CHLORIDE 9 MG/ML
250 INJECTION, SOLUTION INTRAVENOUS CONTINUOUS
Status: CANCELLED | OUTPATIENT
Start: 2020-05-05

## 2020-05-05 RX ORDER — DIPHENHYDRAMINE HYDROCHLORIDE 50 MG/ML
50 INJECTION INTRAMUSCULAR; INTRAVENOUS ONCE
Status: CANCELLED | OUTPATIENT
Start: 2020-05-05

## 2020-05-05 RX ORDER — EPINEPHRINE 1 MG/ML
0.3 INJECTION, SOLUTION, CONCENTRATE INTRAVENOUS PRN
Status: CANCELLED | OUTPATIENT
Start: 2020-05-05

## 2020-05-05 RX ORDER — PALONOSETRON HYDROCHLORIDE 0.05 MG/ML
0.25 INJECTION, SOLUTION INTRAVENOUS ONCE
Status: COMPLETED | OUTPATIENT
Start: 2020-05-05 | End: 2020-05-05

## 2020-05-05 RX ADMIN — DEXAMETHASONE SODIUM PHOSPHATE 10 MG: 10 INJECTION INTRAMUSCULAR; INTRAVENOUS at 09:29

## 2020-05-05 RX ADMIN — ATROPINE SULFATE 0.4 MG: 0.4 INJECTION, SOLUTION INTRAMUSCULAR; INTRAVENOUS; SUBCUTANEOUS at 09:46

## 2020-05-05 RX ADMIN — PALONOSETRON 0.25 MG: 0.25 INJECTION, SOLUTION INTRAVENOUS at 09:27

## 2020-05-05 RX ADMIN — SODIUM CHLORIDE 250 ML: 9 INJECTION, SOLUTION INTRAVENOUS at 09:26

## 2020-05-05 RX ADMIN — BEVACIZUMAB 400 MG: 400 INJECTION, SOLUTION INTRAVENOUS at 09:49

## 2020-05-05 RX ADMIN — Medication 500 UNITS: at 12:19

## 2020-05-05 RX ADMIN — FLUOROURACIL 850 MG: 50 INJECTION, SOLUTION INTRAVENOUS at 12:15

## 2020-05-05 RX ADMIN — Medication 10 ML: at 12:19

## 2020-05-05 RX ADMIN — SODIUM CHLORIDE 400 MG: 9 INJECTION, SOLUTION INTRAVENOUS at 10:25

## 2020-05-05 RX ADMIN — LEUCOVORIN CALCIUM 850 MG: 10 INJECTION INTRAMUSCULAR; INTRAVENOUS at 10:25

## 2020-05-05 NOTE — PROGRESS NOTES
3488. Bone scan on 11/10/2015 noted no metastatic disease. CT chest on 11/10/2015 revealed Multiple pulmonary nodules in the upper and lower lobes consistent with metastatic disease;   Hepatic metastasis also visualized. 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 Mediport was placed. Cycle # 1 of FOLFOX + Avastin was on 11/23/2015. CEA was 4555 on 11/23/2015. Cycle # 2 of FOLFOX + Avastin was on 12/08/2015. CEA was 3160 on 12/08/2015. Cycle # 3 of FOLFOX + Avastin was on 12/22/2015. CEA was 2516 on 12/21/2015. Cycle # 4 of FOLFOX + Avastin was on 01/05/2016. CEA was 2098 on 01/05/2015. Cycle # 5 of FOLFOX + Avastin was on 01/19/2016. CEA was 1511 on 01/05/2015.     -Bone scan on 01/26/2016 noted no metastatic disease. CT chest on 01/26/2016 revealed Significant response to treatment with no visible residual nodules. CT scan abdomen/pelvis on 01/26/2016 noted Interval decreased size of multiple masses in the liver compatible with treatment response. Continue another 2 months of FOLFOX + Avastin and repeat scans. Cycle # 6 of FOLFOX + Avastin was on 02/02/2016.  on 02/02/2016. Cycle # 7 of FOLFOX + Avastin was on 02/16/2016.  on 02/16/2016. Cycle # 8 of FOLFOX + Avastin was on 03/01/2016.  on 03/01/2016. Cycle # 9 of FOLFOX + Avastin was on 03/15/2016.  on 03/15/2016. Cycle # 10 of FOLFOX + Avastin was on 03/29/2016. .4 on 03/29/2016. Cycle # 11 of FOLFOX + Avastin was on 04/12/2016. .4 on 04/12/2016.     Re-staging scans on 04/19/2016: CT Chest: clear lungs; no evidence of recurring pulmonary nodule; CT Abdomen/Pelvis: Further interval decrease in size of the multiple metastatic hepatic lesions, the largest lesion now measures 3.9 x 3.5 cm and previously measured 4.5 cm in maximum diameter. No mesenteric lymphadenopathy is identified; Bone Scan: No evidence of osseous metastasis.   Continue same regimen and re-stage in 2-3 months. Cycle # 12 FOLFOX + Avastin was on 04/26/2016. .5 on 04/26/2016. Cycle # 13 FOLFOX + Avastin was on 05/10/2016. .3 on 05/10/2016. Cycle # 14 FOLFOX+AVASTIN was on 05/24/2016. .5 on 05/24/2016. Cycle # 15 FOLFOX + Avastin was on 06/07/2016. CEA 90.5 on 06/07/2016. Admitted to [facility] 06/13/2016-06/16/2016 for abdominal pain: EGD noted 1.5 cm clean based duodenal bulb ulceration s/p epinephrine and bicap per Dr. Huan Elias. No active bleeding. A. Stomach, biopsy: Mild chronic gastritis, immunostain negative for Helicobacter  B. Esophagus, biopsy: Gastric glandular mucosa with prominent intestinal metaplasia ([place]'s epithelium), negative for epithelial dysplasia, esophageal squamous mucosa not identified  Cycle # 16 FOLFOX (discontinued avastin (bevacizumab) given association of peptic ulcer disease and known association of GI perforation) was on 06/21/2016. Cycle # 17 FOLFOX was on 07/05/2016. CEA 52.6 on 07/19/2016. Cycle # 17 FOLFOX was on 07/05/2016. CEA 52.6 on 07/05/2016. CEA 47.6 on 07/19/2016.     Re-staging scans 07/19/2106: CT Chest negative for metastatic disease. CT Abdomen/Pelvis: Stable hepatic lesions; Question new mural thickening in the cecum. Bone Scan: New Let hip lesion suspicious for bone metastasis.     Increased CEA; new mural thickening in the cecum and new left hip lesion suspicious for bone metastasis are consistent with disease progression; He derived maximum benefit from FOLFOX/AVASTIN. D/C FOLFOX/AVASTIN. We recommended FOLFIRI second line therapy. Cycle # 1 FOLFIRI was on 08/02/2016. CEA 40.8 on 08/02/2016. Xgeva q4 weeks started on 08/02/2016. Cycle # 2 FOLFIRI was on 08/16/2016. CEA 31.7 on 08/16/2016. Cycle # 3 FOLFIRI (added Avastin) was on 08/30/2016 given that ulcers healed on EGD 08/15/2016 by Dr. Emerson Lai; Protonix bid since avastin re-started. Colonoscopy on 08/29/2016 (to look at the cecal area) unremarkable.  CEA 26.7 abdomen/pelvis 12/19/2019 Previously described small hypodense lesions are more conspicuous on the current study compared to the recent study throughout the liver from 8/20/2019, however similar to the prior study from 2/21/2019. Findings may be related to differences in contrast opacification. No abdominal or pelvic lymphadenopathy. Continue FOLFIRI + Avastin and repeat scans in 2 months to f/u on liver lesions. Cycle # 81 FOLFIRI + Avastin was on 01/07/2020. CEA 3.8 on 01/06/2020. Cycle # 82 FOLFIRI + Avastin was on 01/21/2020. CEA 3.7 on 01/20/2020. Cycle # 83 FOLFIRI + Avastin was on 02/04/2020. CEA 4.1 on 02/03/2020. Cycle # 84 FOLFIRI + Avastin was on 02/18/2020. CEA 4.6 on 02/17/2020. EGD by Dr. Fay Belcher 03/02/2020 showing no masses or lesions  Cycle # 85 FOLFIRI + Avastin was on 03/03/2020. CEA 7.4 on 03/02/2020. Cycle # 86 FOLFIRI + Avastin was on 03/17/2020. CEA 4.5 on 03/16/2020. Colonoscopy on 03/23/2020 by Dr. Fay aguero (records requested). Cycle # 87 FOLFIRI + Avastin was on 03/31/2020. CEA 4.1 on 03/30/2020. Cycle # 88 FOLFIRI + Avastin was on 04/21/2020. CEA 6.4 on 04/20/2020. Cycle # 89 FOLFIRI + Avastin is today 05/05/2020. CEA 5.8 on 05/04/2020. He presented today 05/05/2020 Cycle # 89 FOLFIRI + Avastin. No fever, chills. No chest pain or SOB. No nausea or vomiting. No diarrhea. Review of Systems;  CONSTITUTIONAL: No fever, chills. Fair appetite and energy level. ENMT: Eyes: No diplopia; Nose: No epistaxis. Mouth:No lesions  RESPIRATORY: No hemoptysis, shortness of breath. CARDIOVASCULAR: No chest pain, palpitations. GASTROINTESTINAL: No nausea/vomiting, abdominal pain. GENITOURINARY: No dysuria, urinary frequency, hematuria. NEURO: No syncope, presyncope, headache. Remainder ROS: Negative.     Past Medical History:   Past Medical History               Diagnosis Date    Arthritis      Cancer ([facility] [address]         colon    Depression      Hyperlipidemia   FOLFIRI/Avastin was on 09/13/2016. CEA 23.4 on 09/13/2016. Cycle # 5 FOLFIRI/Avastin was on 09/27/2016. CEA 22.3 on 09/27/2016. Cycle # 6 FOLFIRI/Avastin was on 10/11/2016. CEA 18.4 on 10/11/2016.      Bone scan 10/21/2016 stable bone metastasis; ? New lesion anterior left sixth rib likely interval healing fracture. CT abdomen/pelvis revealed stable hepatic metastasis. CT chest negative for metastatic disease. Continue FOLFIRI/Avastin and repeat scans in 3 months. Cycle # 7 FOLFIRI/Avastin was on 10/25/2016. CEA 16.1 on 10/25/2016. Cycle # 8 FOLFIRI/Avastin was on 11/08/2016. CEA 15.7 on 11/08/2016. Cycle # 9 FOLFIRI/Avastin was on 11/29/2016. CEA 13.6 on 11/29/2016. Cycle # 10 FOLFIRI/Avastin was on 12/13/2016. CEA 10.6 on 12/13/2016. Cycle # 11 FOLFIRI/Avastin was on 12/27/2016. CEA 11.1 on 12/27/2016. Cycle # 12 FOLFIRI/Avastin was on 01/10/2017. CEA 9.7 on 01/10/2017. CT chest 01/23/2017 No new pulmonary nodule or lymphadenopathy. Patchy groundglass opacities within the left lower lobe, suggestive of infectious or inflammatory etiology. Followup CT chest in 3 months. Slight increased linear sclerosis along the left anterior sixth rib corresponding to an area of increased uptake on the prior bone scan from 10/21/2016, favored to be related to interval healing changes of a nondisplaced fracture. CT abdomen/pelvis on 01/23/2017 Redemonstration of multiple hepatic metastases, which are similar compared to the prior CT from 10/21/2016. Redemonstration of area of increased sclerosis along the right acetabulum suspicious for metastatic disease. Bone scan on 01/23/2017 Stable subtle uptake within the left proximal diaphysis, again suggestive of metastatic disease  Decreased subtle uptake within the medial right acetabulum. Decreased uptake within the left anterior sixth rib corresponding to linear sclerosis on the recent CT, favored to be related to an joint rib fracture.     Continue CEA 5.9 on 08/22/2017. Cycle # 29 FOLFIRI + Avastin was on 09/05/2017. CEA 6.3 on 09/05/2017. Cycle # 30 FOLFIRI + Avastin was on 09/19/2017. CEA 6.3 on 09/19/2017. Bone scan 09/26/2017 stable. CT abdomen/pelvis on 09/26/2017 Stable hypodense mass in the liver. Stable sclerotic lesion in the acetabulum. CT chest 09/26/2017 negative for metastatic disease. Cycle # 31 FOLFIRI + Avastin was on 10/03/2017. CEA 7.4 on 10/03/2017. Cycle # 32 FOLFIRI + Avastin was on 10/17/2017. CEA 6.0 on 10/17/2017. Cycle # 33 FOLFIRI + Avastin was on 10/31/2017. CEA 6.8 on 10/31/2017. Cycle # 34 FOLFIRI + Avastin was on 11/14/2017. CEA 7.2 on 11/14/2017. Cycle # 35 FOLFIRI + Avastin was on 11/28/2017. CEA 5.1 on 11/28/2017. Cycle # 36 FOLFIRI + Avastin was on 12/12/2017. CEA 6.1 on 12/12/2017. Bone scan 12/22/2017 noted no evidence of metastatic disease to the axial or appendicular skeleton. CT chest 12/22/2017 noted no evidence of metastatic disease. CT abdomen/pelvis 12/22/2017 noted stable hypodense lesions in the liver. Continue FOLFIRI + Avastin and repeat scans in 3 months. Cycle # 37 FOLFIRI + Avastin was on 12/27/2018. CEA 6.7 on 12/27/2017. Cycle # 38 FOLFIRI + Avastin was on 01/09/2018. CEA 5.0 on 01/09/2018. Cycle # 39 FOLFIRI + Avastin was on 01/23/2018. CEA 6.5 on 01/23/2018. Cycle # 40 FOLFIRI + Avastin was on 02/06/2018. CEA 6.9 on 02/06/2018. Cycle # 41 FOLFIRI + Avastin was on 02/20/2018. CEA 6.4 on 02/20/2018. Cycle # 42 FOLFIRI + Avastin was on 03/06/2018. CEA 6.6 on 03/06/2018. Cycle # 43 FOLFIRI + Avastin was on 03/20/2018. CEA 5.7 on 03/20/2018. Bone scan on 03/26/2018 negative for metastatic disease. CT chest 03/26/2018 noted ? new 7 mm nodule in LUCY. CT abdomen/pelvis 03/26/2018 noted stable hypodense lesions in the liver. ? New small ascites in the abdomen.   Patient wants to continue to monitor the lung finding and repeat scans in 2 months instead of changing the current chemotherapy nodule. No mediastinal or osseous lesion. CT abdomen/pelvis 11/02/2018 stable metastatic disease to liver. No new metastatic disease identified. No mesenteric or retroperitoneal LN. No gross colonic lesion identified. Continue FOLFIRI + Avastin and repeat scans in 3 months. Cycle # 58 FOLFIRI + Avastin was on 11/06/2018. CEA 7.6 on 11/05/2018. Cycle # 59 FOLFIRI + Avastin was on 11/27/2018. CEA 6.9 on 11/26/2018. Cycle # 60 FOLFIRI + Avastin was on 12/11/2018. CEA 6.7 on 12/11/2018. Cycle # 61 FOLFIRI + Avastin was on 01/08/2019. CEA 5.6 on 01/07/2019. Cycle # 62 FOLFIRI + Avastin was on 01/29/2019. CEA 4.6 on 01/28/2019. Cycle # 63 FOLFIRI + Avastin was on 02/12/2019. CEA 4.3 on 02/11/2019. CT chest 02/21/2019 no evidence of metastatic disease. CT abdomen/pelvis 02/21/2019 stable hypodense liver lesions. No evidence of worsening metastatic disease. 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 Ordered MRI thoracic spine and referred to neurosurgery team.  Cycle # 64 FOLFIRI + Avastin was on 02/26/2019. CEA 4.7 on 02/25/2019. Cycle # 65 FOLFIRI + Avastin was on 03/12/2019. CEA 4.0 on 03/11/2019. Cycle # 66 FOLFIRI + Avastin was on 03/26/2019. CEA 4.7 on 03/25/2019. Cycle # 67 FOLFIRI + Avastin was on 04/09/2019. CEA 5.6 on 04/08/2019. Cycle # 68 FOLFIRI + Avastin was on 04/30/2019. CEA 4.9 on 04/29/2019. Cycle # 69 FOLFIRI + Avastin was on 05/14/2019. CEA 5.3 on 05/14/2019. CT chest 05/23/2019 stable small lung nodule with no evidence of metastatic disease. CT abdomen/pelvis 05/23/2019 Decrease conspicuity of the liver lesions. No new lesions seen. Stable splenomegaly. Continue FOLFIRI + Avastin and repeat scans in 3 months. Cycle # 70 FOLFIRI + Avastin was on 06/04/2019. CEA 4.8 on 06/03/2019. Cycle # 71 FOLFIRI + Avastin was on 06/18/2019. CEA 4.6 on 06/17/2019. Cycle # 72 FOLFIRI + Avastin was on 07/09/2019. CEA 4.3 on 07/08/2019.   Cycle # 73 FOLFIRI +

## 2020-05-07 ENCOUNTER — HOSPITAL ENCOUNTER (OUTPATIENT)
Dept: INFUSION THERAPY | Age: 70
Discharge: HOME | End: 2020-05-07
Payer: MEDICARE

## 2020-05-07 DIAGNOSIS — C78.7 RECTAL CANCER METASTASIZED TO LIVER (HCC): ICD-10-CM

## 2020-05-07 DIAGNOSIS — C20 RECTAL CANCER METASTASIZED TO LIVER (HCC): ICD-10-CM

## 2020-05-07 DIAGNOSIS — Z51.11 ENCOUNTER FOR ANTINEOPLASTIC CHEMOTHERAPY: Primary | ICD-10-CM

## 2020-05-07 PROCEDURE — 6360000002 HC RX W HCPCS: Performed by: NURSE PRACTITIONER

## 2020-05-07 PROCEDURE — 96372 THER/PROPH/DIAG INJ SC/IM: CPT

## 2020-05-07 RX ADMIN — PEGFILGRASTIM 6 MG: 6 INJECTION SUBCUTANEOUS at 12:37

## 2020-05-07 NOTE — PROGRESS NOTES
Presents to clinic for CADD pump removal. Port site appears normal. Denies problems/concerns. Received 245.9 ml of 5-FU & reservoir 14.1 of 5-FU. Port flushed with 10 ml. NSS followed by 5 ml. Heparin Rinse prior to de access. DSD to area. Tolerated well. Encouraged to call clinic with questions/concerns.

## 2020-05-11 ENCOUNTER — TELEPHONE (OUTPATIENT)
Dept: INFUSION THERAPY | Age: 70
End: 2020-05-11

## 2020-05-11 ENCOUNTER — HOSPITAL ENCOUNTER (EMERGENCY)
Age: 70
Discharge: HOME | End: 2020-05-11
Attending: EMERGENCY MEDICINE
Payer: MEDICARE

## 2020-05-11 VITALS
DIASTOLIC BLOOD PRESSURE: 64 MMHG | SYSTOLIC BLOOD PRESSURE: 104 MMHG | HEART RATE: 62 BPM | HEIGHT: 74 IN | BODY MASS INDEX: 25.15 KG/M2 | WEIGHT: 196 LBS | RESPIRATION RATE: 18 BRPM | TEMPERATURE: 98.2 F | OXYGEN SATURATION: 100 %

## 2020-05-11 LAB
ABO/RH: NORMAL
ALBUMIN SERPL-MCNC: 3.6 G/DL (ref 3.5–5.2)
ALP BLD-CCNC: 175 U/L (ref 40–129)
ALT SERPL-CCNC: 15 U/L (ref 0–40)
ANION GAP SERPL CALCULATED.3IONS-SCNC: 13 MMOL/L (ref 7–16)
ANISOCYTOSIS: ABNORMAL
ANTIBODY SCREEN: NORMAL
AST SERPL-CCNC: 27 U/L (ref 0–39)
BASOPHILS ABSOLUTE: 0 E9/L (ref 0–0.2)
BASOPHILS RELATIVE PERCENT: 0 % (ref 0–2)
BILIRUB SERPL-MCNC: 1.8 MG/DL (ref 0–1.2)
BLOOD BANK DISPENSE STATUS: NORMAL
BLOOD BANK DISPENSE STATUS: NORMAL
BLOOD BANK PRODUCT CODE: NORMAL
BLOOD BANK PRODUCT CODE: NORMAL
BPU ID: NORMAL
BPU ID: NORMAL
BUN BLDV-MCNC: 23 MG/DL (ref 8–23)
CALCIUM SERPL-MCNC: 9.1 MG/DL (ref 8.6–10.2)
CHLORIDE BLD-SCNC: 105 MMOL/L (ref 98–107)
CO2: 24 MMOL/L (ref 22–29)
CREAT SERPL-MCNC: 0.8 MG/DL (ref 0.7–1.2)
DESCRIPTION BLOOD BANK: NORMAL
DESCRIPTION BLOOD BANK: NORMAL
DOHLE BODIES: ABNORMAL
EOSINOPHILS ABSOLUTE: 0.14 E9/L (ref 0.05–0.5)
EOSINOPHILS RELATIVE PERCENT: 4 % (ref 0–6)
GFR AFRICAN AMERICAN: >60
GFR NON-AFRICAN AMERICAN: >60 ML/MIN/1.73
GLUCOSE BLD-MCNC: 97 MG/DL (ref 74–99)
HCT VFR BLD CALC: 22.3 % (ref 37–54)
HEMOGLOBIN: 6.8 G/DL (ref 12.5–16.5)
HYPOCHROMIA: ABNORMAL
INR BLD: 1.1
LYMPHOCYTES ABSOLUTE: 0.44 E9/L (ref 1.5–4)
LYMPHOCYTES RELATIVE PERCENT: 13 % (ref 20–42)
MCH RBC QN AUTO: 32.5 PG (ref 26–35)
MCHC RBC AUTO-ENTMCNC: 30.5 % (ref 32–34.5)
MCV RBC AUTO: 106.7 FL (ref 80–99.9)
MONOCYTES ABSOLUTE: 0 E9/L (ref 0.1–0.95)
MONOCYTES RELATIVE PERCENT: 0 % (ref 2–12)
NEUTROPHILS ABSOLUTE: 2.82 E9/L (ref 1.8–7.3)
NEUTROPHILS RELATIVE PERCENT: 83 % (ref 43–80)
PDW BLD-RTO: 17.6 FL (ref 11.5–15)
PLATELET # BLD: 52 E9/L (ref 130–450)
PLATELET CONFIRMATION: NORMAL
PMV BLD AUTO: 12.2 FL (ref 7–12)
POIKILOCYTES: ABNORMAL
POTASSIUM REFLEX MAGNESIUM: 4.3 MMOL/L (ref 3.5–5)
PROTHROMBIN TIME: 13.1 SEC (ref 9.3–12.4)
RBC # BLD: 2.09 E12/L (ref 3.8–5.8)
SCHISTOCYTES: ABNORMAL
SODIUM BLD-SCNC: 142 MMOL/L (ref 132–146)
TEAR DROP CELLS: ABNORMAL
TOTAL PROTEIN: 6.1 G/DL (ref 6.4–8.3)
TOXIC GRANULATION: ABNORMAL
WBC # BLD: 3.4 E9/L (ref 4.5–11.5)

## 2020-05-11 PROCEDURE — 99283 EMERGENCY DEPT VISIT LOW MDM: CPT

## 2020-05-11 PROCEDURE — 86901 BLOOD TYPING SEROLOGIC RH(D): CPT

## 2020-05-11 PROCEDURE — 85025 COMPLETE CBC W/AUTO DIFF WBC: CPT

## 2020-05-11 PROCEDURE — P9016 RBC LEUKOCYTES REDUCED: HCPCS

## 2020-05-11 PROCEDURE — 86850 RBC ANTIBODY SCREEN: CPT

## 2020-05-11 PROCEDURE — 86923 COMPATIBILITY TEST ELECTRIC: CPT

## 2020-05-11 PROCEDURE — 86900 BLOOD TYPING SEROLOGIC ABO: CPT

## 2020-05-11 PROCEDURE — 36430 TRANSFUSION BLD/BLD COMPNT: CPT

## 2020-05-11 PROCEDURE — 80053 COMPREHEN METABOLIC PANEL: CPT

## 2020-05-11 PROCEDURE — 85610 PROTHROMBIN TIME: CPT

## 2020-05-11 RX ORDER — SODIUM CHLORIDE 9 MG/ML
INJECTION, SOLUTION INTRAVENOUS
Status: DISCONTINUED
Start: 2020-05-11 | End: 2020-05-12 | Stop reason: HOSPADM

## 2020-05-11 RX ORDER — 0.9 % SODIUM CHLORIDE 0.9 %
20 INTRAVENOUS SOLUTION INTRAVENOUS ONCE
Status: DISCONTINUED | OUTPATIENT
Start: 2020-05-11 | End: 2020-05-12 | Stop reason: HOSPADM

## 2020-05-11 ASSESSMENT — ENCOUNTER SYMPTOMS
SORE THROAT: 0
SHORTNESS OF BREATH: 0
EYE PAIN: 0
NAUSEA: 0
COUGH: 0
ABDOMINAL PAIN: 0
DIARRHEA: 0
WHEEZING: 0
BACK PAIN: 0
SINUS PRESSURE: 0
EYE REDNESS: 0
EYE DISCHARGE: 0
VOMITING: 0

## 2020-05-11 ASSESSMENT — PAIN DESCRIPTION - PAIN TYPE: TYPE: ACUTE PAIN;CHRONIC PAIN

## 2020-05-11 ASSESSMENT — PAIN DESCRIPTION - LOCATION: LOCATION: BACK

## 2020-05-11 ASSESSMENT — PAIN SCALES - GENERAL: PAINLEVEL_OUTOF10: 8

## 2020-05-11 ASSESSMENT — PAIN DESCRIPTION - DESCRIPTORS: DESCRIPTORS: ACHING

## 2020-05-11 NOTE — ED PROVIDER NOTES
and anemia. Patient has a known history of colon cancer and is undergoing chemotherapy for the past 5 years. He has had several blood transfusions in the past.  His hemoglobin was taken at the office and it was 6.2. Patient denies any blood thinner use, denies any hematuria or dark or melanotic stools. He denies any lightheadedness, dizziness, chest pain, shortness of breath, nausea, vomiting, dysuria, hematuria. He does admit to abdominal pain, but states that this is very mild and typical for him given his cancer. On examination he is well in appearance. Pale conjunctive a. Clear breath sounds in all lung fields. Regular rate and rhythm of the heart. Mild generalized abdominal tenderness palpation, abdomen soft, nondistended. No guarding rigidity. No focal neurological deficits. Patient is awake, alert, oriented x3. Gee Ibarra DO      [MS]   1752 2 units PRBC ordered   Hemoglobin Quant(!!): 6.8 []   1952 First unit of blood is running, patient is resting comfortably in bed. No acute distress. []      ED Course User Index  [] Paz Quiles DO  [MS] Naz Vargas DO       --------------------------------------------- PAST HISTORY ---------------------------------------------  Past Medical History:  has a past medical history of Arthritis, Cancer ([facility] [address] Depression, Hyperlipidemia, and Hypertension. Past Surgical History:  has a past surgical history that includes Knee arthroscopy (Right); Tonsillectomy (Bilateral); Colonoscopy; Colonoscopy (11/2/15); liver biopsy (11/12/15); skin full graft; and fracture surgery. Social History:  reports that he quit smoking about 19 years ago. His smoking use included cigarettes. He has a 40.00 pack-year smoking history. He has never used smokeless tobacco. He reports that he does not drink alcohol or use drugs. Family History: family history includes Cancer in his father and mother; Diabetes in his brother.      The patients home counseling regarding the diagnosis and prognosis. Their questions are answered at this time and they are agreeable with the plan. I discussed at length with them reasons for immediate return here for re evaluation. They will followup with primary care by calling their office tomorrow. --------------------------------- ADDITIONAL PROVIDER NOTES ---------------------------------  At this time the patient is without objective evidence of an acute process requiring hospitalization or inpatient management. They have remained hemodynamically stable throughout their entire ED visit and are stable for discharge with outpatient follow-up. The plan has been discussed in detail and they are aware of the specific conditions for emergent return, as well as the importance of follow-up. New Prescriptions    No medications on file       Diagnosis:  1. Anemia, unspecified type        Disposition:  Patient's disposition: Discharge to home  Patient's condition is stable.             Tish Vo DO  05/11/20 [id number]

## 2020-05-12 ENCOUNTER — CARE COORDINATION (OUTPATIENT)
Dept: CARE COORDINATION | Age: 70
End: 2020-05-12

## 2020-05-12 NOTE — ED NOTES
Two units ordered but only \"1 order to infuse\". Dr Pooja Serrano discontinued the second unit order Second unit of blood dispensed from lab and then discarded -per protocol.       Mary Quezada RN  05/11/20 [id number]

## 2020-05-13 ENCOUNTER — TELEPHONE (OUTPATIENT)
Dept: INFUSION THERAPY | Age: 70
End: 2020-05-13

## 2020-05-13 ENCOUNTER — TELEPHONE (OUTPATIENT)
Dept: ONCOLOGY | Age: 70
End: 2020-05-13

## 2020-05-13 NOTE — TELEPHONE ENCOUNTER
Received call from pt. He is experiencing diarrhea despite use of Lomotil/Immodium. Also relays that when he has a regular consistency stool it causes him to scream in pain. Explained that Dr. Terri Aponte is not here today and I will reach out to EZEQUIEL Scales CNP for advise regarding symptoms and possible Pallative Care consult. He is agreeable to plan.

## 2020-05-15 ENCOUNTER — TELEPHONE (OUTPATIENT)
Dept: INFUSION THERAPY | Age: 70
End: 2020-05-15

## 2020-05-15 ENCOUNTER — HOSPITAL ENCOUNTER (INPATIENT)
Age: 70
LOS: 2 days | Discharge: HOME | DRG: 378 | End: 2020-05-17
Attending: EMERGENCY MEDICINE | Admitting: INTERNAL MEDICINE
Payer: MEDICARE

## 2020-05-15 ENCOUNTER — APPOINTMENT (OUTPATIENT)
Dept: GENERAL RADIOLOGY | Age: 70
DRG: 378 | End: 2020-05-15
Payer: MEDICARE

## 2020-05-15 PROBLEM — K92.2 ACUTE GI BLEEDING: Status: ACTIVE | Noted: 2020-05-15

## 2020-05-15 LAB
ABO/RH: NORMAL
ALBUMIN SERPL-MCNC: 3.4 G/DL (ref 3.5–5.2)
ALP BLD-CCNC: 158 U/L (ref 40–129)
ALT SERPL-CCNC: 16 U/L (ref 0–40)
ANION GAP SERPL CALCULATED.3IONS-SCNC: 13 MMOL/L (ref 7–16)
ANISOCYTOSIS: ABNORMAL
ANTIBODY SCREEN: NORMAL
AST SERPL-CCNC: 25 U/L (ref 0–39)
BASOPHILS ABSOLUTE: 0 E9/L (ref 0–0.2)
BASOPHILS RELATIVE PERCENT: 0 % (ref 0–2)
BILIRUB SERPL-MCNC: 1.3 MG/DL (ref 0–1.2)
BLOOD BANK DISPENSE STATUS: NORMAL
BLOOD BANK DISPENSE STATUS: NORMAL
BLOOD BANK PRODUCT CODE: NORMAL
BLOOD BANK PRODUCT CODE: NORMAL
BPU ID: NORMAL
BPU ID: NORMAL
BUN BLDV-MCNC: 13 MG/DL (ref 8–23)
CALCIUM SERPL-MCNC: 8.2 MG/DL (ref 8.6–10.2)
CHLORIDE BLD-SCNC: 102 MMOL/L (ref 98–107)
CO2: 22 MMOL/L (ref 22–29)
CREAT SERPL-MCNC: 1 MG/DL (ref 0.7–1.2)
DESCRIPTION BLOOD BANK: NORMAL
DESCRIPTION BLOOD BANK: NORMAL
EOSINOPHILS ABSOLUTE: 0 E9/L (ref 0.05–0.5)
EOSINOPHILS RELATIVE PERCENT: 1 % (ref 0–6)
GFR AFRICAN AMERICAN: >60
GFR NON-AFRICAN AMERICAN: >60 ML/MIN/1.73
GLUCOSE BLD-MCNC: 128 MG/DL (ref 74–99)
HCT VFR BLD CALC: 19.9 % (ref 37–54)
HCT VFR BLD CALC: 22.6 % (ref 37–54)
HEMOGLOBIN: 6.3 G/DL (ref 12.5–16.5)
HEMOGLOBIN: 7.1 G/DL (ref 12.5–16.5)
HYPOCHROMIA: ABNORMAL
LACTIC ACID: 3.2 MMOL/L (ref 0.5–2.2)
LYMPHOCYTES ABSOLUTE: 0.04 E9/L (ref 1.5–4)
LYMPHOCYTES RELATIVE PERCENT: 11 % (ref 20–42)
MAGNESIUM: 0.8 MG/DL (ref 1.6–2.6)
MCH RBC QN AUTO: 32.8 PG (ref 26–35)
MCHC RBC AUTO-ENTMCNC: 31.7 % (ref 32–34.5)
MCV RBC AUTO: 103.6 FL (ref 80–99.9)
MONOCYTES ABSOLUTE: 0.06 E9/L (ref 0.1–0.95)
MONOCYTES RELATIVE PERCENT: 14 % (ref 2–12)
NEUTROPHILS ABSOLUTE: 0.3 E9/L (ref 1.8–7.3)
NEUTROPHILS RELATIVE PERCENT: 74 % (ref 43–80)
OVALOCYTES: ABNORMAL
PDW BLD-RTO: 17.2 FL (ref 11.5–15)
PLATELET # BLD: 33 E9/L (ref 130–450)
PLATELET CONFIRMATION: NORMAL
PMV BLD AUTO: 13 FL (ref 7–12)
POIKILOCYTES: ABNORMAL
POTASSIUM REFLEX MAGNESIUM: 3.3 MMOL/L (ref 3.5–5)
RBC # BLD: 1.92 E12/L (ref 3.8–5.8)
SODIUM BLD-SCNC: 137 MMOL/L (ref 132–146)
TEAR DROP CELLS: ABNORMAL
TOTAL PROTEIN: 5.7 G/DL (ref 6.4–8.3)
TROPONIN: <0.01 NG/ML (ref 0–0.03)
WBC # BLD: 0.4 E9/L (ref 4.5–11.5)

## 2020-05-15 PROCEDURE — 83735 ASSAY OF MAGNESIUM: CPT

## 2020-05-15 PROCEDURE — 80053 COMPREHEN METABOLIC PANEL: CPT

## 2020-05-15 PROCEDURE — 96375 TX/PRO/DX INJ NEW DRUG ADDON: CPT

## 2020-05-15 PROCEDURE — 1200000000 HC SEMI PRIVATE

## 2020-05-15 PROCEDURE — 2580000003 HC RX 258: Performed by: INTERNAL MEDICINE

## 2020-05-15 PROCEDURE — 36430 TRANSFUSION BLD/BLD COMPNT: CPT

## 2020-05-15 PROCEDURE — 94761 N-INVAS EAR/PLS OXIMETRY MLT: CPT

## 2020-05-15 PROCEDURE — 86850 RBC ANTIBODY SCREEN: CPT

## 2020-05-15 PROCEDURE — 85025 COMPLETE CBC W/AUTO DIFF WBC: CPT

## 2020-05-15 PROCEDURE — 6360000002 HC RX W HCPCS: Performed by: INTERNAL MEDICINE

## 2020-05-15 PROCEDURE — 85018 HEMOGLOBIN: CPT

## 2020-05-15 PROCEDURE — 71045 X-RAY EXAM CHEST 1 VIEW: CPT

## 2020-05-15 PROCEDURE — 83605 ASSAY OF LACTIC ACID: CPT

## 2020-05-15 PROCEDURE — 93005 ELECTROCARDIOGRAM TRACING: CPT | Performed by: STUDENT IN AN ORGANIZED HEALTH CARE EDUCATION/TRAINING PROGRAM

## 2020-05-15 PROCEDURE — 86923 COMPATIBILITY TEST ELECTRIC: CPT

## 2020-05-15 PROCEDURE — G0378 HOSPITAL OBSERVATION PER HR: HCPCS

## 2020-05-15 PROCEDURE — P9016 RBC LEUKOCYTES REDUCED: HCPCS

## 2020-05-15 PROCEDURE — 6360000002 HC RX W HCPCS: Performed by: STUDENT IN AN ORGANIZED HEALTH CARE EDUCATION/TRAINING PROGRAM

## 2020-05-15 PROCEDURE — 86901 BLOOD TYPING SEROLOGIC RH(D): CPT

## 2020-05-15 PROCEDURE — 2580000003 HC RX 258

## 2020-05-15 PROCEDURE — 30233N1 TRANSFUSION OF NONAUTOLOGOUS RED BLOOD CELLS INTO PERIPHERAL VEIN, PERCUTANEOUS APPROACH: ICD-10-PCS | Performed by: INTERNAL MEDICINE

## 2020-05-15 PROCEDURE — 99285 EMERGENCY DEPT VISIT HI MDM: CPT

## 2020-05-15 PROCEDURE — 99223 1ST HOSP IP/OBS HIGH 75: CPT | Performed by: INTERNAL MEDICINE

## 2020-05-15 PROCEDURE — 36415 COLL VENOUS BLD VENIPUNCTURE: CPT

## 2020-05-15 PROCEDURE — C9113 INJ PANTOPRAZOLE SODIUM, VIA: HCPCS | Performed by: INTERNAL MEDICINE

## 2020-05-15 PROCEDURE — 85014 HEMATOCRIT: CPT

## 2020-05-15 PROCEDURE — 86900 BLOOD TYPING SEROLOGIC ABO: CPT

## 2020-05-15 PROCEDURE — 96365 THER/PROPH/DIAG IV INF INIT: CPT

## 2020-05-15 PROCEDURE — 84484 ASSAY OF TROPONIN QUANT: CPT

## 2020-05-15 RX ORDER — VITAMIN B COMPLEX
1 CAPSULE ORAL DAILY
COMMUNITY

## 2020-05-15 RX ORDER — PROMETHAZINE HYDROCHLORIDE 25 MG/1
12.5 TABLET ORAL EVERY 6 HOURS PRN
Status: DISCONTINUED | OUTPATIENT
Start: 2020-05-15 | End: 2020-05-15

## 2020-05-15 RX ORDER — POLYETHYLENE GLYCOL 3350 17 G/17G
17 POWDER, FOR SOLUTION ORAL DAILY PRN
Status: DISCONTINUED | OUTPATIENT
Start: 2020-05-15 | End: 2020-05-17 | Stop reason: HOSPADM

## 2020-05-15 RX ORDER — ACETAMINOPHEN 650 MG/1
650 SUPPOSITORY RECTAL EVERY 6 HOURS PRN
Status: DISCONTINUED | OUTPATIENT
Start: 2020-05-15 | End: 2020-05-17 | Stop reason: HOSPADM

## 2020-05-15 RX ORDER — SODIUM CHLORIDE 0.9 % (FLUSH) 0.9 %
10 SYRINGE (ML) INJECTION PRN
Status: DISCONTINUED | OUTPATIENT
Start: 2020-05-15 | End: 2020-05-17 | Stop reason: HOSPADM

## 2020-05-15 RX ORDER — POTASSIUM CHLORIDE 7.45 MG/ML
10 INJECTION INTRAVENOUS ONCE
Status: COMPLETED | OUTPATIENT
Start: 2020-05-15 | End: 2020-05-15

## 2020-05-15 RX ORDER — SODIUM CHLORIDE 9 MG/ML
10 INJECTION INTRAVENOUS 2 TIMES DAILY
Status: DISCONTINUED | OUTPATIENT
Start: 2020-05-15 | End: 2020-05-17 | Stop reason: HOSPADM

## 2020-05-15 RX ORDER — SODIUM CHLORIDE 9 MG/ML
INJECTION, SOLUTION INTRAVENOUS
Status: COMPLETED
Start: 2020-05-15 | End: 2020-05-16

## 2020-05-15 RX ORDER — 0.9 % SODIUM CHLORIDE 0.9 %
250 INTRAVENOUS SOLUTION INTRAVENOUS ONCE
Status: COMPLETED | OUTPATIENT
Start: 2020-05-15 | End: 2020-05-16

## 2020-05-15 RX ORDER — 0.9 % SODIUM CHLORIDE 0.9 %
20 INTRAVENOUS SOLUTION INTRAVENOUS ONCE
Status: DISCONTINUED | OUTPATIENT
Start: 2020-05-15 | End: 2020-05-17 | Stop reason: HOSPADM

## 2020-05-15 RX ORDER — MAGNESIUM SULFATE IN WATER 40 MG/ML
2 INJECTION, SOLUTION INTRAVENOUS ONCE
Status: COMPLETED | OUTPATIENT
Start: 2020-05-15 | End: 2020-05-15

## 2020-05-15 RX ORDER — PANTOPRAZOLE SODIUM 40 MG/10ML
40 INJECTION, POWDER, LYOPHILIZED, FOR SOLUTION INTRAVENOUS 2 TIMES DAILY
Status: DISCONTINUED | OUTPATIENT
Start: 2020-05-15 | End: 2020-05-17 | Stop reason: HOSPADM

## 2020-05-15 RX ORDER — PANTOPRAZOLE SODIUM 40 MG/1
40 TABLET, DELAYED RELEASE ORAL DAILY
COMMUNITY

## 2020-05-15 RX ORDER — ONDANSETRON 2 MG/ML
4 INJECTION INTRAMUSCULAR; INTRAVENOUS EVERY 6 HOURS PRN
Status: DISCONTINUED | OUTPATIENT
Start: 2020-05-15 | End: 2020-05-15

## 2020-05-15 RX ORDER — SODIUM CHLORIDE 0.9 % (FLUSH) 0.9 %
10 SYRINGE (ML) INJECTION EVERY 12 HOURS SCHEDULED
Status: DISCONTINUED | OUTPATIENT
Start: 2020-05-15 | End: 2020-05-17 | Stop reason: HOSPADM

## 2020-05-15 RX ORDER — PROCHLORPERAZINE EDISYLATE 5 MG/ML
10 INJECTION INTRAMUSCULAR; INTRAVENOUS EVERY 6 HOURS PRN
Status: DISCONTINUED | OUTPATIENT
Start: 2020-05-15 | End: 2020-05-17 | Stop reason: HOSPADM

## 2020-05-15 RX ORDER — POTASSIUM CHLORIDE 20 MEQ/1
20 TABLET, EXTENDED RELEASE ORAL DAILY
COMMUNITY

## 2020-05-15 RX ORDER — LORATADINE 10 MG/1
10 TABLET ORAL SEE ADMIN INSTRUCTIONS
COMMUNITY

## 2020-05-15 RX ORDER — ACETAMINOPHEN 325 MG/1
650 TABLET ORAL EVERY 6 HOURS PRN
Status: DISCONTINUED | OUTPATIENT
Start: 2020-05-15 | End: 2020-05-17 | Stop reason: HOSPADM

## 2020-05-15 RX ADMIN — Medication 250 ML: at 15:18

## 2020-05-15 RX ADMIN — PANTOPRAZOLE SODIUM 40 MG: 40 INJECTION, POWDER, FOR SOLUTION INTRAVENOUS at 22:27

## 2020-05-15 RX ADMIN — SODIUM CHLORIDE 250 ML: 9 INJECTION, SOLUTION INTRAVENOUS at 15:18

## 2020-05-15 RX ADMIN — Medication 10 ML: at 22:28

## 2020-05-15 RX ADMIN — SODIUM CHLORIDE, PRESERVATIVE FREE 10 ML: 5 INJECTION INTRAVENOUS at 22:27

## 2020-05-15 RX ADMIN — MAGNESIUM SULFATE HEPTAHYDRATE 2 G: 40 INJECTION, SOLUTION INTRAVENOUS at 16:50

## 2020-05-15 RX ADMIN — POTASSIUM CHLORIDE 10 MEQ: 7.46 INJECTION, SOLUTION INTRAVENOUS at 17:41

## 2020-05-15 ASSESSMENT — ENCOUNTER SYMPTOMS
VOMITING: 0
TROUBLE SWALLOWING: 0
PHOTOPHOBIA: 0
HEMATOCHEZIA: 1
BLOOD IN STOOL: 1
SHORTNESS OF BREATH: 0
ABDOMINAL PAIN: 0
DIARRHEA: 0
VOICE CHANGE: 0
WHEEZING: 0
BACK PAIN: 0
NAUSEA: 0
COUGH: 0

## 2020-05-15 ASSESSMENT — PAIN SCALES - GENERAL: PAINLEVEL_OUTOF10: 3

## 2020-05-15 NOTE — ED PROVIDER NOTES
The patient is a 78-year-old male with a history of colon cancer with metastasis currently on chemotherapy, GI bleeds, and anemia who presents the emergency department complaining of fatigue and hematochezia. Patient states that he was seen here approximately 5 days ago for low hemoglobin level and had 1 unit of packed red blood cells transfused. He states that on Tuesday he experienced several episodes of diarrhea. The patient reports today he had a normal bowel movement and there was a small of blood in the toilet bowl. Patient states he has been experiencing chills at home and worsening fatigue and weakness. The patient states that he gets chemotherapy every other week, and his last session was last Tuesday. His hematologist is Dr. Sam Joseph. The patient states that he also follows up with GI, and had an upper and lower scope done about a month ago which were both negative by Dr. Cong May. The patient states that he does have a history of hemorrhoids. He denies any fever, chest pain, shortness of breath, abdominal pain, nausea, vomiting, lightheadedness, dizziness, syncope, fall, blood thinner use, or other acute symptoms or concerns. The history is provided by the patient. Fatigue   Severity:  Moderate  Onset quality:  Gradual  Timing:  Constant  Progression:  Worsening  Chronicity:  Recurrent  Relieved by:  None tried  Worsened by:  Nothing  Ineffective treatments:  None tried  Associated symptoms: hematochezia    Associated symptoms: no abdominal pain, no arthralgias, no chest pain, no cough, no diarrhea, no difficulty walking, no dizziness, no dysuria, no falls, no fever, no frequency, no headaches, no loss of consciousness, no melena, no myalgias, no nausea, no seizures, no shortness of breath, no syncope and no vomiting    Risk factors: anemia         Review of Systems   Constitutional: Positive for fatigue. Negative for chills and fever.    HENT: Negative for congestion, trouble swallowing and External hemorrhoid present. No mass, tenderness, anal fissure or internal hemorrhoid. Skin:     General: Skin is warm and dry. Neurological:      Mental Status: He is alert and oriented to person, place, and time. Motor: No tremor or abnormal muscle tone. Coordination: Coordination normal.          Procedures     MDM  Number of Diagnoses or Management Options  Acute blood loss anemia:   Gastrointestinal hemorrhage, unspecified gastrointestinal hemorrhage type:   Hypokalemia:   Hypomagnesemia:   Lactic acidosis:   Leukopenia, unspecified type: Thrombocytopenia [facility]):   Diagnosis management comments: The patient is a 26-year-old male who presents the emergency department complaining of fatigue and weakness. The patient is hemodynamically stable, chronically ill in appearance, but no acute distress. I suspect acute GI bleed with acute on chronic anemia versus electrolyte abnormality. We will proceed with basic labs. Patient is neutropenic with a white blood cell count of 0.4 and neutrophil of 0.3, also anemic with a hemoglobin of 6.3, hypo-kalemia with potassium of 3.3, hypomagnesemia with a magnesium of 0.8, hypocalcemia with a calcium of 8.2, lactic acidosis with a lactic of 3.2, thrombocytopenia with platelet count of 33, and chest x-ray does not show acute process. Repleted electrolytes, and 1 unit of packed red blood cells ordered. Will consult PCP for admission. Patient accepted for admission. Discussed results and plan of care with patient and she verbalized understanding agreement to admission. ED Course as of May 15 1453   Fri May 15, 2020   7206 ATTENDING PROVIDER ATTESTATION:     I have personally performed and/or participated in the history, exam, medical decision making, and procedures and agree with all pertinent clinical information unless otherwise noted. I have also reviewed and agree with the past medical, family and social history unless otherwise noted.     I have discussed this patient in detail with the resident and provided the instruction and education regarding the evidence-based evaluation and treatment of fatigue. History: Patient with history of rectosigmoid colon cancer with liver metastasis. Seen here recently for similar symptoms including bright red blood per rectum. At that time, he is found to be anemic. He was transfused packed red blood cells and eventually discharged home. He returns today with increasing fatigue. No shortness of breath or chest pain. My findings: Amisha Vargas is a 69 y.o. male whom is in no distress. Physical exam reveals he is alert and oriented. Heart is regular lungs are decreased bilaterally. Abdomen is soft. Posterior pharynx is dry. Skin turgor is poor. Extremities are intact without edema. Good distal pulses. My plan: Symptomatic and supportive care. Type and screen, labs. EKG. Electronically signed by Errol Zavaleta DO on 5/15/20 at 12:52 PM EDT          [TG]   144 Consulted with Dr. Jack Cuenca, covering for Dr. Steff Adler, who accepted the patient for admission. Consult placed to Dr. Julia Rodgers as well. [KG]      ED Course User Index  [KG] Alissa Sewell DO  [TG] Errol Zavaleta DO          EKG: This EKG is signed and interpreted by me. Rate: 79  Rhythm: Sinus  Interpretation: Normal sinus rhythm, left axis deviation, regular branch block, T wave inversions in the anterior septal leads  Comparison: changes compared to previous EKG      ED Course as of May 15 1454   Fri May 15, 2020   1252 ATTENDING PROVIDER ATTESTATION:     I have personally performed and/or participated in the history, exam, medical decision making, and procedures and agree with all pertinent clinical information unless otherwise noted. I have also reviewed and agree with the past medical, family and social history unless otherwise noted.     I have discussed this patient in detail with the resident and provided the instruction of admission.    --------------------------------- ADDITIONAL PROVIDER NOTES ---------------------------------  Consultations:  Spoke with Dr. Jonny Ruiz. Discussed case. They will admit the patient. This patient's ED course included: a personal history and physicial examination, re-evaluation prior to disposition, multiple bedside re-evaluations, IV medications, cardiac monitoring, continuous pulse oximetry and complex medical decision making and emergency management    This patient has remained hemodynamically stable during their ED course. Diagnosis:  1. Gastrointestinal hemorrhage, unspecified gastrointestinal hemorrhage type    2. Acute blood loss anemia    3. Hypomagnesemia    4. Hypokalemia    5. Leukopenia, unspecified type    6. Thrombocytopenia (HCC)    7. Lactic acidosis        Disposition:  Patient's disposition: Admit to med-surg with telemetry  Patient's condition is stable.          Demetrius Ramos DO  Resident  05/15/20 0359

## 2020-05-15 NOTE — CONSULTS
insecurity     Worry: Not on file     Inability: Not on file    Transportation needs     Medical: Not on file     Non-medical: Not on file   Tobacco Use    Smoking status: Former Smoker     Packs/day: 1.00     Years: 40.00     Pack years: 40.00     Types: Cigarettes     Last attempt to quit: 2001     Years since quittin.3    Smokeless tobacco: Never Used   Substance and Sexual Activity    Alcohol use: No     Alcohol/week: 0.0 standard drinks     Comment: has not drank for last two weeks    Drug use: No    Sexual activity: Not Currently   Lifestyle    Physical activity     Days per week: Not on file     Minutes per session: Not on file    Stress: Not on file   Relationships    Social connections     Talks on phone: Not on file     Gets together: Not on file     Attends Hinduism service: Not on file     Active member of club or organization: Not on file     Attends meetings of clubs or organizations: Not on file     Relationship status: Not on file    Intimate partner violence     Fear of current or ex partner: Not on file     Emotionally abused: Not on file     Physically abused: Not on file     Forced sexual activity: Not on file   Other Topics Concern    Not on file   Social History Narrative    Not on file     Allergies: Allergies   Allergen Reactions    Neosporin [Neomycin-Polymyxin-Gramicidin] Rash    Tape Zeyad Loge Tape] Rash     Physical Exam:  BP (!) 115/55   Pulse 75   Temp 98.6 °F (37 °C) (Oral)   Resp 18   Ht 6' 2\" (1.88 m)   Wt 182 lb (82.6 kg)   SpO2 100%   BMI 23.37 kg/m²   GENERAL: Alert, oriented x 3, not in acute distress. HEENT: PERRLA; EOMI. Oropharynx clear. NECK: Supple. Without lymphadenopathy. LUNGS: Good air entry bilaterally. No wheezing, crackles or ronchi. CARDIOVASCULAR: Regular rate. No murmurs, rubs or gallops. ABDOMEN: Soft. Non-tender, non-distended. Positive bowel sounds. EXTREMITIES: Without clubbing, cyanosis, or edema.    NEUROLOGIC: No focal deficits. ECOG PS 1]    Diagnostics:  Lab Results   Component Value Date    WBC 0.4 (LL) 05/15/2020    HGB 7.1 (L) 05/15/2020    HCT 22.6 (L) 05/15/2020    .6 (H) 05/15/2020    PLT 33 (L) 05/15/2020     Lab Results   Component Value Date     05/15/2020    K 3.3 (L) 05/15/2020     05/15/2020    CO2 22 05/15/2020    BUN 13 05/15/2020    CREATININE 1.0 05/15/2020    GLUCOSE 128 (H) 05/15/2020    CALCIUM 8.2 (L) 05/15/2020    PROT 5.7 (L) 05/15/2020    LABALBU 3.4 (L) 05/15/2020    BILITOT 1.3 (H) 05/15/2020    ALKPHOS 158 (H) 05/15/2020    AST 25 05/15/2020    ALT 16 05/15/2020    LABGLOM >60 05/15/2020    GFRAA >60 05/15/2020     Impression/Plan:  70 y/o male with metastatic rectal cancer to liver on systemic chemotherapy FOLFIRI + Avastin (last dose on 05/05/2020), admitted for fatigue and hematochezia. GI team consulted. Consider CT abdomen/pelvis  Transfuse if Hb <7.0  Continue to monitor CBC with diff. Hold chemotherapy at this time.      Thank you for allowing us to participate in the care of  Renzo Berry MD   4/90/8037  Board Certified Medical Oncologist

## 2020-05-15 NOTE — TELEPHONE ENCOUNTER
Received call from pt. Voice hoarse. Experiencing increasingly severe abdominal pain unrelieved by Tylenol. Also states \" he is shaking all over\". T 98F.  but had Tylenol at 7AM. Very nauseated despite using anti emetic as prescribed. . Advised to go to ER for assessment. He is reluctant to go to ER . he will call his PCP first then decide if he will go to ER. Wife is available to provide transportation.

## 2020-05-16 LAB
ALBUMIN SERPL-MCNC: 2.9 G/DL (ref 3.5–5.2)
ALP BLD-CCNC: 125 U/L (ref 40–129)
ALT SERPL-CCNC: 13 U/L (ref 0–40)
ANION GAP SERPL CALCULATED.3IONS-SCNC: 10 MMOL/L (ref 7–16)
AST SERPL-CCNC: 19 U/L (ref 0–39)
BILIRUB SERPL-MCNC: 2.2 MG/DL (ref 0–1.2)
BUN BLDV-MCNC: 13 MG/DL (ref 8–23)
CALCIUM SERPL-MCNC: 7.7 MG/DL (ref 8.6–10.2)
CHLORIDE BLD-SCNC: 104 MMOL/L (ref 98–107)
CO2: 22 MMOL/L (ref 22–29)
CREAT SERPL-MCNC: 1.1 MG/DL (ref 0.7–1.2)
GFR AFRICAN AMERICAN: >60
GFR NON-AFRICAN AMERICAN: >60 ML/MIN/1.73
GLUCOSE BLD-MCNC: 99 MG/DL (ref 74–99)
HCT VFR BLD CALC: 21.9 % (ref 37–54)
HCT VFR BLD CALC: 25.2 % (ref 37–54)
HCT VFR BLD CALC: 26.5 % (ref 37–54)
HEMOGLOBIN: 7.1 G/DL (ref 12.5–16.5)
HEMOGLOBIN: 8 G/DL (ref 12.5–16.5)
HEMOGLOBIN: 8.4 G/DL (ref 12.5–16.5)
MCH RBC QN AUTO: 32.1 PG (ref 26–35)
MCHC RBC AUTO-ENTMCNC: 32.4 % (ref 32–34.5)
MCV RBC AUTO: 99.1 FL (ref 80–99.9)
PDW BLD-RTO: 18.7 FL (ref 11.5–15)
PLATELET # BLD: 43 E9/L (ref 130–450)
PLATELET CONFIRMATION: NORMAL
PMV BLD AUTO: 11.9 FL (ref 7–12)
POTASSIUM SERPL-SCNC: 3.5 MMOL/L (ref 3.5–5)
RBC # BLD: 2.21 E12/L (ref 3.8–5.8)
SODIUM BLD-SCNC: 136 MMOL/L (ref 132–146)
TOTAL PROTEIN: 5.2 G/DL (ref 6.4–8.3)
WBC # BLD: 0.8 E9/L (ref 4.5–11.5)

## 2020-05-16 PROCEDURE — 6360000002 HC RX W HCPCS: Performed by: INTERNAL MEDICINE

## 2020-05-16 PROCEDURE — G0378 HOSPITAL OBSERVATION PER HR: HCPCS

## 2020-05-16 PROCEDURE — 2580000003 HC RX 258: Performed by: INTERNAL MEDICINE

## 2020-05-16 PROCEDURE — C9113 INJ PANTOPRAZOLE SODIUM, VIA: HCPCS | Performed by: INTERNAL MEDICINE

## 2020-05-16 PROCEDURE — 80053 COMPREHEN METABOLIC PANEL: CPT

## 2020-05-16 PROCEDURE — 96366 THER/PROPH/DIAG IV INF ADDON: CPT

## 2020-05-16 PROCEDURE — 96376 TX/PRO/DX INJ SAME DRUG ADON: CPT

## 2020-05-16 PROCEDURE — 85018 HEMOGLOBIN: CPT

## 2020-05-16 PROCEDURE — 1200000000 HC SEMI PRIVATE

## 2020-05-16 PROCEDURE — 85027 COMPLETE CBC AUTOMATED: CPT

## 2020-05-16 PROCEDURE — 36415 COLL VENOUS BLD VENIPUNCTURE: CPT

## 2020-05-16 PROCEDURE — 85014 HEMATOCRIT: CPT

## 2020-05-16 PROCEDURE — 6370000000 HC RX 637 (ALT 250 FOR IP): Performed by: INTERNAL MEDICINE

## 2020-05-16 RX ORDER — MAGNESIUM SULFATE IN WATER 40 MG/ML
2 INJECTION, SOLUTION INTRAVENOUS ONCE
Status: COMPLETED | OUTPATIENT
Start: 2020-05-16 | End: 2020-05-17

## 2020-05-16 RX ORDER — LANOLIN ALCOHOL/MO/W.PET/CERES
400 CREAM (GRAM) TOPICAL 2 TIMES DAILY
Status: DISCONTINUED | OUTPATIENT
Start: 2020-05-16 | End: 2020-05-17 | Stop reason: HOSPADM

## 2020-05-16 RX ADMIN — Medication 10 ML: at 20:41

## 2020-05-16 RX ADMIN — PANTOPRAZOLE SODIUM 40 MG: 40 INJECTION, POWDER, FOR SOLUTION INTRAVENOUS at 20:41

## 2020-05-16 RX ADMIN — MAGNESIUM SULFATE 2 G: 2 INJECTION INTRAVENOUS at 20:41

## 2020-05-16 RX ADMIN — Medication 400 MG: at 20:41

## 2020-05-16 RX ADMIN — PANTOPRAZOLE SODIUM 40 MG: 40 INJECTION, POWDER, FOR SOLUTION INTRAVENOUS at 08:31

## 2020-05-16 RX ADMIN — Medication 10 ML: at 08:31

## 2020-05-16 RX ADMIN — SODIUM CHLORIDE, PRESERVATIVE FREE 10 ML: 5 INJECTION INTRAVENOUS at 20:41

## 2020-05-16 ASSESSMENT — PAIN SCALES - GENERAL: PAINLEVEL_OUTOF10: 0

## 2020-05-16 NOTE — PLAN OF CARE
Problem: Falls - Risk of:  Goal: Will remain free from falls  Description: Will remain free from falls  5/16/2020 1115 by Yelitza Arriola RN  Outcome: Met This Shift     Problem: Pain:  Goal: Control of acute pain  Description: Control of acute pain  5/16/2020 1115 by Yelitza Arriola RN  Outcome: Met This Shift

## 2020-05-16 NOTE — CONSULTS
[facility]  Dr. Xiomara Reynoso M.D., Dr. Elaine Schirmer, M.D., MIKE hSanks.O., Dr. Soila France M.D., Dr. Galina Franz D.O. Patient Name: David Linton  MRN: [id number]  : 1950 (69 y.o. male)  Allergies: is allergic to neosporin [neomycin-polymyxin-gramicidin] and tape [adhesive tape]. Date of Service: 2020       Reason for Consultation:  Anemia, Hematochezia     HISTORY OF PRESENT ILLNESS:    Patient is a 50-year-old  male with a past medical history significant for metastatic rectal cancer to the liver on systemic chemotherapy with FOLFIRI + Avastin (last dose on 2020), and essential hypertension. Patient presented to [facility] emergency room previous evening with main complaint of fever chills and some diarrhea. Patient currently undergoing chemotherapy. Patient underwent routine lab work in the emergency room. Patient had a hemoglobin of 6.3 down from patient's baseline of 7.3-8. Patient was typed and crossmatched and transfused 1 unit of PRBCs and admitted to  the fourth floor. Patient was seen and evaluated on the fourth floor this AM.  Patient states he last received chemo approximately 2 weeks prior. Patient states he had recent colonoscopy and EGD in the office. Per patient no source of patient's bleeding was identified at that time. Patient denies any use of NSAIDs or ibuprofen at home. Patient does admit to some on and off diarrhea over the last several days. Patient dates he has now resolved. Patient is he did have one hard bowel movement last night with some bright red blood in the toilet bowl. Patient does admit to significant history of hemorrhoids. REVIEW OF SYSTEMS:   Constitutional: Denies fever, chills, or unintentional weight loss. HEENT: Denies double or blurry vision, headaches, ear pain or ringing in the ears. No drainage from the ears, nose or throat.   Cardiovascular: Denies any chest pain, irregular file    Stress: Not on file   Relationships    Social connections     Talks on phone: Not on file     Gets together: Not on file     Attends Baptism service: Not on file     Active member of club or organization: Not on file     Attends meetings of clubs or organizations: Not on file     Relationship status: Not on file    Intimate partner violence     Fear of current or ex partner: Not on file     Emotionally abused: Not on file     Physically abused: Not on file     Forced sexual activity: Not on file   Other Topics Concern    Not on file   Social History Narrative    Not on file       Family History:  Family History   Problem Relation Age of Onset    Cancer Mother         breast cancer    Cancer Father         malignant brain tumor    Diabetes Brother          PHYSICAL EXAM:  Vital Signs: BP (!) 97/55   Pulse 59   Temp 98 °F (36.7 °C) (Oral)   Resp 18   Ht 6' 2\" (1.88 m)   Wt 182 lb (82.6 kg)   SpO2 97%   BMI 23.37 kg/m²   GENERAL APPEARANCE:  awake, alert, oriented, cooperative, and in no acute distress  EYES:  Lids and lashes normal, PERRLA, EOMI, sclera clear, conjunctiva normal  HENT:  Normocephalic, without obvious abnormality, atramatic, oral pharynx with moist mucus membranes, tonsils without erythema or exudates  NECK:  Supple with no carotid bruits, JVD or thyromegaly. No cervical adenopathy  LUNGS:  Clear to auscultation bilaterally with no wheezes, rales or rhonchi. No increased work of breathing, good air exchange. CARDIOVASCULAR: Regular rate and rhythm, no murmur  ABDOMEN:  normal bowel sounds in all 4 quadrants, soft, non-distended, non-tender, no masses palpated, no hepatosplenomegally  MUSCULOSKELETAL:  There is no redness, warmth, or swelling of the joints. Full range of motion noted. Motor strength is 5 out of 5 all extremities bilaterally.   Tone is normal.  EXTREMITIES: No edema, 2+ pulses bilaterally (radial and dorsalis pedis)  NEUROLOGIC:  Awake, alert, oriented to Result Value Ref Range    Troponin <0.01 0.00 - 0.03 ng/mL   Lactic Acid, Plasma   Result Value Ref Range    Lactic Acid 3.2 (H) 0.5 - 2.2 mmol/L   Platelet Confirmation   Result Value Ref Range    Platelet Confirmation CONFIRMED    Magnesium   Result Value Ref Range    Magnesium 0.8 (LL) 1.6 - 2.6 mg/dL   Hemoglobin and hematocrit, blood   Result Value Ref Range    Hemoglobin 7.1 (L) 12.5 - 16.5 g/dL    Hematocrit 22.6 (L) 37.0 - 54.0 %   Comprehensive metabolic panel   Result Value Ref Range    Sodium 136 132 - 146 mmol/L    Potassium 3.5 3.5 - 5.0 mmol/L    Chloride 104 98 - 107 mmol/L    CO2 22 22 - 29 mmol/L    Anion Gap 10 7 - 16 mmol/L    Glucose 99 74 - 99 mg/dL    BUN 13 8 - 23 mg/dL    CREATININE 1.1 0.7 - 1.2 mg/dL    GFR Non-African American >60 >=60 mL/min/1.73    GFR African American >60     Calcium 7.7 (L) 8.6 - 10.2 mg/dL    Total Protein 5.2 (L) 6.4 - 8.3 g/dL    Alb 2.9 (L) 3.5 - 5.2 g/dL    Total Bilirubin 2.2 (H) 0.0 - 1.2 mg/dL    Alkaline Phosphatase 125 40 - 129 U/L    ALT 13 0 - 40 U/L    AST 19 0 - 39 U/L   CBC   Result Value Ref Range    WBC 0.8 (LL) 4.5 - 11.5 E9/L    RBC 2.21 (L) 3.80 - 5.80 E12/L    Hemoglobin 7.1 (L) 12.5 - 16.5 g/dL    Hematocrit 21.9 (L) 37.0 - 54.0 %    MCV 99.1 80.0 - 99.9 fL    MCH 32.1 26.0 - 35.0 pg    MCHC 32.4 32.0 - 34.5 %    RDW 18.7 (H) 11.5 - 15.0 fL    Platelets 43 (L) 391 - 450 E9/L    MPV 11.9 7.0 - 12.0 fL   Platelet Confirmation   Result Value Ref Range    Platelet Confirmation CONFIRMED    EKG 12 Lead   Result Value Ref Range    Ventricular Rate 79 BPM    Atrial Rate 79 BPM    P-R Interval 168 ms    QRS Duration 136 ms    Q-T Interval 476 ms    QTc Calculation (Bazett) 545 ms    P Axis 55 degrees    R Axis -38 degrees    T Axis 24 degrees   TYPE AND SCREEN   Result Value Ref Range    ABO/Rh O POS     Antibody Screen NEG    PREPARE RBC (CROSSMATCH), 1 Units   Result Value Ref Range    Product Code Blood Bank [id number]     Description Blood Bank Red

## 2020-05-17 VITALS
WEIGHT: 182 LBS | SYSTOLIC BLOOD PRESSURE: 111 MMHG | DIASTOLIC BLOOD PRESSURE: 56 MMHG | RESPIRATION RATE: 18 BRPM | TEMPERATURE: 98.1 F | OXYGEN SATURATION: 100 % | HEIGHT: 74 IN | HEART RATE: 56 BPM | BODY MASS INDEX: 23.36 KG/M2

## 2020-05-17 LAB
ALBUMIN SERPL-MCNC: 2.9 G/DL (ref 3.5–5.2)
ALP BLD-CCNC: 138 U/L (ref 40–129)
ALT SERPL-CCNC: 17 U/L (ref 0–40)
ANION GAP SERPL CALCULATED.3IONS-SCNC: 11 MMOL/L (ref 7–16)
AST SERPL-CCNC: 28 U/L (ref 0–39)
BASOPHILS ABSOLUTE: 0.01 E9/L (ref 0–0.2)
BASOPHILS RELATIVE PERCENT: 1 % (ref 0–2)
BILIRUB SERPL-MCNC: 1.3 MG/DL (ref 0–1.2)
BUN BLDV-MCNC: 10 MG/DL (ref 8–23)
CALCIUM SERPL-MCNC: 7.7 MG/DL (ref 8.6–10.2)
CHLORIDE BLD-SCNC: 104 MMOL/L (ref 98–107)
CO2: 23 MMOL/L (ref 22–29)
CREAT SERPL-MCNC: 1.1 MG/DL (ref 0.7–1.2)
EKG ATRIAL RATE: 79 BPM
EKG P AXIS: 55 DEGREES
EKG P-R INTERVAL: 168 MS
EKG Q-T INTERVAL: 476 MS
EKG QRS DURATION: 136 MS
EKG QTC CALCULATION (BAZETT): 545 MS
EKG R AXIS: -38 DEGREES
EKG T AXIS: 24 DEGREES
EKG VENTRICULAR RATE: 79 BPM
EOSINOPHILS ABSOLUTE: 0.04 E9/L (ref 0.05–0.5)
EOSINOPHILS RELATIVE PERCENT: 3 % (ref 0–6)
GFR AFRICAN AMERICAN: >60
GFR NON-AFRICAN AMERICAN: >60 ML/MIN/1.73
GLUCOSE BLD-MCNC: 103 MG/DL (ref 74–99)
HCT VFR BLD CALC: 23.8 % (ref 37–54)
HCT VFR BLD CALC: 28.6 % (ref 37–54)
HEMOGLOBIN: 7.8 G/DL (ref 12.5–16.5)
HEMOGLOBIN: 8.9 G/DL (ref 12.5–16.5)
HYPOCHROMIA: ABNORMAL
LYMPHOCYTES ABSOLUTE: 0.39 E9/L (ref 1.5–4)
LYMPHOCYTES RELATIVE PERCENT: 28 % (ref 20–42)
MCH RBC QN AUTO: 32.8 PG (ref 26–35)
MCHC RBC AUTO-ENTMCNC: 32.8 % (ref 32–34.5)
MCV RBC AUTO: 100 FL (ref 80–99.9)
MONOCYTES ABSOLUTE: 0.21 E9/L (ref 0.1–0.95)
MONOCYTES RELATIVE PERCENT: 15 % (ref 2–12)
NEUTROPHILS ABSOLUTE: 0.74 E9/L (ref 1.8–7.3)
NEUTROPHILS RELATIVE PERCENT: 53 % (ref 43–80)
NUCLEATED RED BLOOD CELLS: 1 /100 WBC
OVALOCYTES: ABNORMAL
PDW BLD-RTO: 18.8 FL (ref 11.5–15)
PLATELET # BLD: 56 E9/L (ref 130–450)
PLATELET CONFIRMATION: NORMAL
PMV BLD AUTO: 11.5 FL (ref 7–12)
POIKILOCYTES: ABNORMAL
POLYCHROMASIA: ABNORMAL
POTASSIUM SERPL-SCNC: 3.5 MMOL/L (ref 3.5–5)
RBC # BLD: 2.38 E12/L (ref 3.8–5.8)
SODIUM BLD-SCNC: 138 MMOL/L (ref 132–146)
TOTAL PROTEIN: 5.3 G/DL (ref 6.4–8.3)
WBC # BLD: 1.4 E9/L (ref 4.5–11.5)

## 2020-05-17 PROCEDURE — 80053 COMPREHEN METABOLIC PANEL: CPT

## 2020-05-17 PROCEDURE — 96376 TX/PRO/DX INJ SAME DRUG ADON: CPT

## 2020-05-17 PROCEDURE — 6370000000 HC RX 637 (ALT 250 FOR IP): Performed by: INTERNAL MEDICINE

## 2020-05-17 PROCEDURE — 85025 COMPLETE CBC W/AUTO DIFF WBC: CPT

## 2020-05-17 PROCEDURE — 6360000002 HC RX W HCPCS: Performed by: INTERNAL MEDICINE

## 2020-05-17 PROCEDURE — 2580000003 HC RX 258: Performed by: INTERNAL MEDICINE

## 2020-05-17 PROCEDURE — C9113 INJ PANTOPRAZOLE SODIUM, VIA: HCPCS | Performed by: INTERNAL MEDICINE

## 2020-05-17 PROCEDURE — 85018 HEMOGLOBIN: CPT

## 2020-05-17 PROCEDURE — 36415 COLL VENOUS BLD VENIPUNCTURE: CPT

## 2020-05-17 PROCEDURE — G0378 HOSPITAL OBSERVATION PER HR: HCPCS

## 2020-05-17 PROCEDURE — 85014 HEMATOCRIT: CPT

## 2020-05-17 RX ORDER — LANOLIN ALCOHOL/MO/W.PET/CERES
400 CREAM (GRAM) TOPICAL 2 TIMES DAILY
Qty: 30 TABLET | Refills: 0 | Status: SHIPPED | OUTPATIENT
Start: 2020-05-17 | End: 2020-05-17

## 2020-05-17 RX ORDER — LANOLIN ALCOHOL/MO/W.PET/CERES
400 CREAM (GRAM) TOPICAL 2 TIMES DAILY
Qty: 30 TABLET | Refills: 0
Start: 2020-05-17 | End: 2020-05-17

## 2020-05-17 RX ORDER — LANOLIN ALCOHOL/MO/W.PET/CERES
400 CREAM (GRAM) TOPICAL 2 TIMES DAILY
Qty: 30 TABLET | Refills: 0 | Status: SHIPPED | OUTPATIENT
Start: 2020-05-17

## 2020-05-17 RX ADMIN — Medication 400 MG: at 08:29

## 2020-05-17 RX ADMIN — PANTOPRAZOLE SODIUM 40 MG: 40 INJECTION, POWDER, FOR SOLUTION INTRAVENOUS at 08:29

## 2020-05-17 RX ADMIN — Medication 10 ML: at 08:29

## 2020-05-17 RX ADMIN — ACETAMINOPHEN 650 MG: 325 TABLET, FILM COATED ORAL at 12:30

## 2020-05-17 ASSESSMENT — PAIN SCALES - GENERAL
PAINLEVEL_OUTOF10: 0
PAINLEVEL_OUTOF10: 4

## 2020-05-17 NOTE — CARE COORDINATION
5/17/2020 1317 CM note: NO COVID-19 TESTING. CM working remotely from office d/t pandemic. Spoke with pt by phone for transition of care needs. Pt lives with fiance and is IADLS. He owns a cane and uses as needed. PCP is Dr Tony Coyne and pharmacy is [facility].Pt states he will follow up with Dr Cuong Armas regarding next chemo appt. Plan is home today and fiance will provide ride. No needs identified.  Mali VILLAREAL

## 2020-05-17 NOTE — H&P
hydrochlorothiazide (HYDRODIURIL) 25 MG tablet Take 1 tablet by mouth daily 18  Yes Amy Laurance Prader, APRN - CNP   ondansetron (ZOFRAN) 4 MG tablet Take 1 tablet by mouth every 8 hours as needed for Nausea or Vomiting 18  Yes Amrita Del Rio MD   hydrocortisone (ANUSOL-HC) 2.5 % rectal cream Use 3-4 times daily. Do not use internally. External use only.  17  Yes RUBEN Ambrose CNP   acetaminophen (TYLENOL) 500 MG tablet Take 500 mg by mouth every 6 hours as needed for Pain   Yes Historical Provider, MD       Allergies:    Neosporin [neomycin-polymyxin-gramicidin] and Tape Wayne Hammers tape]    Social History:   Social History     Socioeconomic History    Marital status: Single     Spouse name: Not on file    Number of children: 1    Years of education: Not on file    Highest education level: Not on file   Occupational History    Not on file   Social Needs    Financial resource strain: Not on file    Food insecurity     Worry: Not on file     Inability: Not on file    Transportation needs     Medical: Not on file     Non-medical: Not on file   Tobacco Use    Smoking status: Former Smoker     Packs/day: 1.00     Years: 40.00     Pack years: 40.00     Types: Cigarettes     Last attempt to quit: 2001     Years since quittin.3    Smokeless tobacco: Never Used   Substance and Sexual Activity    Alcohol use: No     Alcohol/week: 0.0 standard drinks     Comment: has not drank for last two weeks    Drug use: No    Sexual activity: Not Currently   Lifestyle    Physical activity     Days per week: Not on file     Minutes per session: Not on file    Stress: Not on file   Relationships    Social connections     Talks on phone: Not on file     Gets together: Not on file     Attends Hoahaoism service: Not on file     Active member of club or organization: Not on file     Attends meetings of clubs or organizations: Not on file     Relationship status: Not on file    Intimate partner violence     Fear of current or ex partner: Not on file     Emotionally abused: Not on file     Physically abused: Not on file     Forced sexual activity: Not on file   Other Topics Concern    Not on file   Social History Narrative    Not on file       Family History:   Family History   Problem Relation Age of Onset    Cancer Mother         breast cancer    Cancer Father         malignant brain tumor    Diabetes Brother        REVIEW OF SYSTEMS:    General:  No fever or chills. No lightheadedness or dizziness  Cardiovascular: Denies any chest pain, irregular heartbeats, or palpitations. Respiratory: Denies shortness of breath, coughing, sputum production, hemoptysis, or wheezing. Gastrointestinal: DIARRHEA   Extremities: Denies any lower extremity swelling or edema. Neurology:  Denies any headache or focal neurological deficits. No weakness or   paresthesia. Derm:  Denies any rashes, ulcers, or excoriations. Denies bruising. Genitourinary:  Denies any urgency, frequency, hematuria. Voiding without difficulty. Musculoskeletal:  Denies any myalgia or arthralgia. No back pain. PHYSICAL EXAM:    Vitals:  BP (!) 104/57   Pulse 63   Temp 98.2 °F (36.8 °C) (Oral)   Resp 18   Ht 6' 2\" (1.88 m)   Wt 182 lb (82.6 kg)   SpO2 100%   BMI 23.37 kg/m²     General:  This is a 69 y.o. yo male who is alert and oriented in NAD  HEENT:  Head is normocephalic and atraumatic, PERRLA, EOMI, mucus membranes moist with no pharyngeal erythema or exudate. Neck:  Supple with no carotid bruits, JVD or thyromegaly.   No cervical adenopathy  CV:  Regular rate and rhythm, no murmurs  Lungs:  Clear to auscultation bilaterally with no wheezes, rales or rhonchi  Abdomen:  Soft, nontender, nondistended, bowel sounds present  Extremities:  No edema, peripheral pulses intact bilaterally  Neuro:  Cranial nerves II-XII grossly intact; motor and sensory function intact with no focal deficits  Skin:  No rashes, lesions or wounds  Osteopathic Structural:  Patient examined in the seated and supine positions. Normal lordosis and kyphosis of the spine with no acute tissue texture abnormalities.     LABS:  reviewed    ASSESSMENT:    pancytopenia  Hx of rectal cancer with mets to liver  Severe hypomagnesemia  S/p chemotherpay  PLAN:  S/p blood transfusion with 2 units of prbcs, monitor h and h, consult gi and hem/onc      Electronically signed by Gabbie Lindsey MD on 5/16/2020 at 8:15 PM

## 2020-05-18 ENCOUNTER — CARE COORDINATION (OUTPATIENT)
Dept: CASE MANAGEMENT | Age: 70
End: 2020-05-18

## 2020-05-19 ENCOUNTER — HOSPITAL ENCOUNTER (OUTPATIENT)
Dept: INFUSION THERAPY | Age: 70
End: 2020-05-19
Payer: MEDICARE

## 2020-05-19 ENCOUNTER — TELEPHONE (OUTPATIENT)
Dept: INFUSION THERAPY | Age: 70
End: 2020-05-19

## 2020-05-26 ENCOUNTER — HOSPITAL ENCOUNTER (OUTPATIENT)
Dept: INFUSION THERAPY | Age: 70
End: 2020-05-26
Payer: MEDICARE

## 2020-05-28 ENCOUNTER — CARE COORDINATION (OUTPATIENT)
Dept: CASE MANAGEMENT | Age: 70
End: 2020-05-28

## 2020-06-01 ENCOUNTER — HOSPITAL ENCOUNTER (OUTPATIENT)
Age: 70
Discharge: HOME | End: 2020-06-01
Payer: MEDICARE

## 2020-06-01 LAB
ALBUMIN SERPL-MCNC: 3.4 G/DL (ref 3.5–5.2)
ALP BLD-CCNC: 176 U/L (ref 40–129)
ALT SERPL-CCNC: 24 U/L (ref 0–40)
ANION GAP SERPL CALCULATED.3IONS-SCNC: 10 MMOL/L (ref 7–16)
ANISOCYTOSIS: ABNORMAL
AST SERPL-CCNC: 37 U/L (ref 0–39)
BASOPHILS ABSOLUTE: 0.11 E9/L (ref 0–0.2)
BASOPHILS RELATIVE PERCENT: 2.6 % (ref 0–2)
BILIRUB SERPL-MCNC: 0.8 MG/DL (ref 0–1.2)
BUN BLDV-MCNC: 15 MG/DL (ref 8–23)
CALCIUM SERPL-MCNC: 8.3 MG/DL (ref 8.6–10.2)
CEA: 5.5 NG/ML (ref 0–5.2)
CHLORIDE BLD-SCNC: 106 MMOL/L (ref 98–107)
CO2: 25 MMOL/L (ref 22–29)
CREAT SERPL-MCNC: 1 MG/DL (ref 0.7–1.2)
EOSINOPHILS ABSOLUTE: 0.21 E9/L (ref 0.05–0.5)
EOSINOPHILS RELATIVE PERCENT: 5.2 % (ref 0–6)
GFR AFRICAN AMERICAN: >60
GFR NON-AFRICAN AMERICAN: >60 ML/MIN/1.73
GLUCOSE BLD-MCNC: 124 MG/DL (ref 74–99)
HCT VFR BLD CALC: 29.6 % (ref 37–54)
HEMOGLOBIN: 9 G/DL (ref 12.5–16.5)
LYMPHOCYTES ABSOLUTE: 0.7 E9/L (ref 1.5–4)
LYMPHOCYTES RELATIVE PERCENT: 17.4 % (ref 20–42)
MCH RBC QN AUTO: 32.1 PG (ref 26–35)
MCHC RBC AUTO-ENTMCNC: 30.4 % (ref 32–34.5)
MCV RBC AUTO: 105.7 FL (ref 80–99.9)
MONOCYTES ABSOLUTE: 0.2 E9/L (ref 0.1–0.95)
MONOCYTES RELATIVE PERCENT: 5.2 % (ref 2–12)
NEUTROPHILS ABSOLUTE: 2.87 E9/L (ref 1.8–7.3)
NEUTROPHILS RELATIVE PERCENT: 69.6 % (ref 43–80)
OVALOCYTES: ABNORMAL
PDW BLD-RTO: 19 FL (ref 11.5–15)
PLATELET # BLD: 110 E9/L (ref 130–450)
PMV BLD AUTO: 10.8 FL (ref 7–12)
POIKILOCYTES: ABNORMAL
POLYCHROMASIA: ABNORMAL
POTASSIUM SERPL-SCNC: 3.6 MMOL/L (ref 3.5–5)
RBC # BLD: 2.8 E12/L (ref 3.8–5.8)
SODIUM BLD-SCNC: 141 MMOL/L (ref 132–146)
TEAR DROP CELLS: ABNORMAL
TOTAL PROTEIN: 6.1 G/DL (ref 6.4–8.3)
WBC # BLD: 4.1 E9/L (ref 4.5–11.5)

## 2020-06-01 PROCEDURE — 80053 COMPREHEN METABOLIC PANEL: CPT

## 2020-06-01 PROCEDURE — 82378 CARCINOEMBRYONIC ANTIGEN: CPT

## 2020-06-01 PROCEDURE — 36415 COLL VENOUS BLD VENIPUNCTURE: CPT

## 2020-06-01 PROCEDURE — 85025 COMPLETE CBC W/AUTO DIFF WBC: CPT

## 2020-06-02 ENCOUNTER — HOSPITAL ENCOUNTER (OUTPATIENT)
Dept: ULTRASOUND IMAGING | Age: 70
Discharge: HOME | End: 2020-06-02
Payer: MEDICARE

## 2020-06-02 ENCOUNTER — OFFICE VISIT (OUTPATIENT)
Dept: ONCOLOGY | Age: 70
End: 2020-06-02
Payer: MEDICARE

## 2020-06-02 ENCOUNTER — HOSPITAL ENCOUNTER (OUTPATIENT)
Dept: INFUSION THERAPY | Age: 70
Discharge: HOME | End: 2020-06-02
Payer: MEDICARE

## 2020-06-02 VITALS
BODY MASS INDEX: 24.51 KG/M2 | DIASTOLIC BLOOD PRESSURE: 65 MMHG | WEIGHT: 191 LBS | TEMPERATURE: 98.1 F | SYSTOLIC BLOOD PRESSURE: 122 MMHG | HEART RATE: 64 BPM | HEIGHT: 74 IN | OXYGEN SATURATION: 100 %

## 2020-06-02 VITALS — SYSTOLIC BLOOD PRESSURE: 132 MMHG | DIASTOLIC BLOOD PRESSURE: 63 MMHG | HEART RATE: 63 BPM

## 2020-06-02 DIAGNOSIS — C20 RECTAL CANCER METASTASIZED TO LIVER (HCC): Primary | ICD-10-CM

## 2020-06-02 DIAGNOSIS — C78.7 RECTAL CANCER METASTASIZED TO LIVER (HCC): Primary | ICD-10-CM

## 2020-06-02 DIAGNOSIS — C79.51 BONE METASTASIS (HCC): ICD-10-CM

## 2020-06-02 PROCEDURE — 93970 EXTREMITY STUDY: CPT

## 2020-06-02 PROCEDURE — 96417 CHEMO IV INFUS EACH ADDL SEQ: CPT

## 2020-06-02 PROCEDURE — 96415 CHEMO IV INFUSION ADDL HR: CPT

## 2020-06-02 PROCEDURE — 96549 UNLISTED CHEMOTHERAPY PX: CPT

## 2020-06-02 PROCEDURE — 96372 THER/PROPH/DIAG INJ SC/IM: CPT

## 2020-06-02 PROCEDURE — 96375 TX/PRO/DX INJ NEW DRUG ADDON: CPT

## 2020-06-02 PROCEDURE — 96413 CHEMO IV INFUSION 1 HR: CPT

## 2020-06-02 PROCEDURE — 96368 THER/DIAG CONCURRENT INF: CPT

## 2020-06-02 PROCEDURE — 96416 CHEMO PROLONG INFUSE W/PUMP: CPT

## 2020-06-02 PROCEDURE — 2580000003 HC RX 258: Performed by: INTERNAL MEDICINE

## 2020-06-02 PROCEDURE — 99214 OFFICE O/P EST MOD 30 MIN: CPT | Performed by: INTERNAL MEDICINE

## 2020-06-02 PROCEDURE — 96411 CHEMO IV PUSH ADDL DRUG: CPT

## 2020-06-02 PROCEDURE — 6360000002 HC RX W HCPCS: Performed by: INTERNAL MEDICINE

## 2020-06-02 RX ORDER — FLUOROURACIL 50 MG/ML
850 INJECTION, SOLUTION INTRAVENOUS ONCE
Status: COMPLETED | OUTPATIENT
Start: 2020-06-02 | End: 2020-06-02

## 2020-06-02 RX ORDER — SODIUM CHLORIDE 0.9 % (FLUSH) 0.9 %
10 SYRINGE (ML) INJECTION PRN
Status: CANCELLED | OUTPATIENT
Start: 2020-06-02

## 2020-06-02 RX ORDER — PALONOSETRON HYDROCHLORIDE 0.05 MG/ML
0.25 INJECTION, SOLUTION INTRAVENOUS ONCE
Status: CANCELLED | OUTPATIENT
Start: 2020-06-02

## 2020-06-02 RX ORDER — 0.9 % SODIUM CHLORIDE 0.9 %
10 VIAL (ML) INJECTION ONCE
Status: CANCELLED | OUTPATIENT
Start: 2020-06-02

## 2020-06-02 RX ORDER — HEPARIN SODIUM (PORCINE) LOCK FLUSH IV SOLN 100 UNIT/ML 100 UNIT/ML
500 SOLUTION INTRAVENOUS PRN
Status: DISCONTINUED | OUTPATIENT
Start: 2020-06-02 | End: 2020-06-03 | Stop reason: HOSPADM

## 2020-06-02 RX ORDER — DIPHENHYDRAMINE HYDROCHLORIDE 50 MG/ML
50 INJECTION INTRAMUSCULAR; INTRAVENOUS ONCE
Status: CANCELLED | OUTPATIENT
Start: 2020-06-02

## 2020-06-02 RX ORDER — SODIUM CHLORIDE 9 MG/ML
250 INJECTION, SOLUTION INTRAVENOUS CONTINUOUS
Status: CANCELLED | OUTPATIENT
Start: 2020-06-02

## 2020-06-02 RX ORDER — ATROPINE SULFATE 0.4 MG/ML
0.4 AMPUL (ML) INJECTION ONCE
Status: COMPLETED | OUTPATIENT
Start: 2020-06-02 | End: 2020-06-02

## 2020-06-02 RX ORDER — DEXAMETHASONE SODIUM PHOSPHATE 10 MG/ML
10 INJECTION INTRAMUSCULAR; INTRAVENOUS ONCE
Status: COMPLETED | OUTPATIENT
Start: 2020-06-02 | End: 2020-06-02

## 2020-06-02 RX ORDER — METHYLPREDNISOLONE SODIUM SUCCINATE 125 MG/2ML
125 INJECTION, POWDER, LYOPHILIZED, FOR SOLUTION INTRAMUSCULAR; INTRAVENOUS ONCE
Status: CANCELLED | OUTPATIENT
Start: 2020-06-02

## 2020-06-02 RX ORDER — PALONOSETRON HYDROCHLORIDE 0.05 MG/ML
0.25 INJECTION, SOLUTION INTRAVENOUS ONCE
Status: COMPLETED | OUTPATIENT
Start: 2020-06-02 | End: 2020-06-02

## 2020-06-02 RX ORDER — SODIUM CHLORIDE 9 MG/ML
INJECTION, SOLUTION INTRAVENOUS CONTINUOUS
Status: CANCELLED | OUTPATIENT
Start: 2020-06-02

## 2020-06-02 RX ORDER — FLUOROURACIL 50 MG/ML
850 INJECTION, SOLUTION INTRAVENOUS ONCE
Status: CANCELLED | OUTPATIENT
Start: 2020-06-02

## 2020-06-02 RX ORDER — DEXAMETHASONE SODIUM PHOSPHATE 10 MG/ML
10 INJECTION, SOLUTION INTRAMUSCULAR; INTRAVENOUS ONCE
Status: CANCELLED | OUTPATIENT
Start: 2020-06-02

## 2020-06-02 RX ORDER — SODIUM CHLORIDE 0.9 % (FLUSH) 0.9 %
10 SYRINGE (ML) INJECTION PRN
Status: DISCONTINUED | OUTPATIENT
Start: 2020-06-02 | End: 2020-06-03 | Stop reason: HOSPADM

## 2020-06-02 RX ORDER — EPINEPHRINE 1 MG/ML
0.3 INJECTION, SOLUTION, CONCENTRATE INTRAVENOUS PRN
Status: CANCELLED | OUTPATIENT
Start: 2020-06-02

## 2020-06-02 RX ORDER — HEPARIN SODIUM (PORCINE) LOCK FLUSH IV SOLN 100 UNIT/ML 100 UNIT/ML
500 SOLUTION INTRAVENOUS PRN
Status: CANCELLED | OUTPATIENT
Start: 2020-06-02

## 2020-06-02 RX ORDER — SODIUM CHLORIDE 9 MG/ML
250 INJECTION, SOLUTION INTRAVENOUS CONTINUOUS
Status: DISCONTINUED | OUTPATIENT
Start: 2020-06-02 | End: 2020-06-03 | Stop reason: HOSPADM

## 2020-06-02 RX ORDER — ATROPINE SULFATE 0.4 MG/ML
0.4 AMPUL (ML) INJECTION ONCE
Status: CANCELLED | OUTPATIENT
Start: 2020-06-02

## 2020-06-02 RX ADMIN — DENOSUMAB 120 MG: 120 INJECTION SUBCUTANEOUS at 10:16

## 2020-06-02 RX ADMIN — SODIUM CHLORIDE, PRESERVATIVE FREE 10 ML: 5 INJECTION INTRAVENOUS at 13:26

## 2020-06-02 RX ADMIN — LEUCOVORIN CALCIUM 850 MG: 350 INJECTION, POWDER, LYOPHILIZED, FOR SOLUTION INTRAMUSCULAR; INTRAVENOUS at 11:39

## 2020-06-02 RX ADMIN — DEXAMETHASONE SODIUM PHOSPHATE 10 MG: 10 INJECTION INTRAMUSCULAR; INTRAVENOUS at 10:16

## 2020-06-02 RX ADMIN — BEVACIZUMAB 400 MG: 400 INJECTION, SOLUTION INTRAVENOUS at 10:39

## 2020-06-02 RX ADMIN — ATROPINE SULFATE 0.4 MG: 0.4 INJECTION, SOLUTION INTRAMUSCULAR; INTRAVENOUS; SUBCUTANEOUS at 10:12

## 2020-06-02 RX ADMIN — Medication 500 UNITS: at 13:26

## 2020-06-02 RX ADMIN — PALONOSETRON 0.25 MG: 0.25 INJECTION, SOLUTION INTRAVENOUS at 10:11

## 2020-06-02 RX ADMIN — SODIUM CHLORIDE 400 MG: 9 INJECTION, SOLUTION INTRAVENOUS at 11:38

## 2020-06-02 RX ADMIN — FLUOROURACIL 850 MG: 50 INJECTION, SOLUTION INTRAVENOUS at 13:19

## 2020-06-02 RX ADMIN — SODIUM CHLORIDE 250 ML: 9 INJECTION, SOLUTION INTRAVENOUS at 10:11

## 2020-06-02 NOTE — PROGRESS NOTES
type.     CEA 3488. Bone scan on 11/10/2015 noted no metastatic disease. CT chest on 11/10/2015 revealed Multiple pulmonary nodules in the upper and lower lobes consistent with metastatic disease;   Hepatic metastasis also visualized. 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 Mediport was placed. Cycle # 1 of FOLFOX + Avastin was on 11/23/2015. CEA was 4555 on 11/23/2015. Cycle # 2 of FOLFOX + Avastin was on 12/08/2015. CEA was 3160 on 12/08/2015. Cycle # 3 of FOLFOX + Avastin was on 12/22/2015. CEA was 2516 on 12/21/2015. Cycle # 4 of FOLFOX + Avastin was on 01/05/2016. CEA was 2098 on 01/05/2015. Cycle # 5 of FOLFOX + Avastin was on 01/19/2016. CEA was 1511 on 01/05/2015.     -Bone scan on 01/26/2016 noted no metastatic disease. CT chest on 01/26/2016 revealed Significant response to treatment with no visible residual nodules. CT scan abdomen/pelvis on 01/26/2016 noted Interval decreased size of multiple masses in the liver compatible with treatment response. Continue another 2 months of FOLFOX + Avastin and repeat scans. Cycle # 6 of FOLFOX + Avastin was on 02/02/2016.  on 02/02/2016. Cycle # 7 of FOLFOX + Avastin was on 02/16/2016.  on 02/16/2016. Cycle # 8 of FOLFOX + Avastin was on 03/01/2016.  on 03/01/2016. Cycle # 9 of FOLFOX + Avastin was on 03/15/2016.  on 03/15/2016. Cycle # 10 of FOLFOX + Avastin was on 03/29/2016. .4 on 03/29/2016. Cycle # 11 of FOLFOX + Avastin was on 04/12/2016. .4 on 04/12/2016.     Re-staging scans on 04/19/2016: CT Chest: clear lungs; no evidence of recurring pulmonary nodule; CT Abdomen/Pelvis: Further interval decrease in size of the multiple metastatic hepatic lesions, the largest lesion now measures 3.9 x 3.5 cm and previously measured 4.5 cm in maximum diameter. No mesenteric lymphadenopathy is identified; Bone Scan: No evidence of osseous metastasis.   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 Ordered MRI thoracic spine and referred to neurosurgery team.    Cycle # 64 FOLFIRI + Avastin was on 02/26/2019. CEA 4.7 on 02/25/2019. Cycle # 65 FOLFIRI + Avastin was on 03/12/2019. CEA 4.0 on 03/11/2019. Cycle # 66 FOLFIRI + Avastin was on 03/26/2019. CEA 4.7 on 03/25/2019. Cycle # 67 FOLFIRI + Avastin was on 04/09/2019. CEA 5.6 on 04/08/2019. Cycle # 68 FOLFIRI + Avastin was on 04/30/2019. CEA 4.9 on 04/29/2019. Cycle # 69 FOLFIRI + Avastin was on 05/14/2019. CEA 5.3 on 05/14/2019. CT chest 05/23/2019 stable small lung nodule with no evidence of metastatic disease. CT abdomen/pelvis 05/23/2019 Decrease conspicuity of the liver lesions. No new lesions seen. Stable splenomegaly. Continue FOLFIRI + Avastin and repeat scans in 3 months. Cycle # 70 FOLFIRI + Avastin was on 06/04/2019. CEA 4.8 on 06/03/2019. Cycle # 71 FOLFIRI + Avastin was on 06/18/2019. CEA 4.6 on 06/17/2019. Cycle # 72 FOLFIRI + Avastin was on 07/09/2019. CEA 4.3 on 07/08/2019. Cycle # 73 FOLFIRI + Avastin was on 07/30/2019. CEA 5.0 on 07/29/2019. Cycle # 74 FOLFIRI + Avastin was on 08/13/2019. CEA 5.0 on 08/12/2019. CT chest 08/20/2019 negative  CT abdomen/pelvis 08/20/2019 Previous identified hepatic lesions are not visualized on the current exam.  Continue FOLFIRI + Avastin and repeat scans in 3 months. Cycle # 75 FOLFIRI + Avastin was on 08/27/2019. CEA 5.0 on 08/26/2019. Cycle # 76 FOLFIRI + Avastin was on 09/17/2019. CEA 5.5 on 09/16/2019. Cycle # 77 FOLFIRI + Avastin was on 10/15/2019. CEA 4.6 on 10/15/2019. Cycle # 78 FOLFIRI + Avastin was on 10/29/2019. CEA 4.1 on 10/28/2019. Cycle # 79 FOLFIRI + Avastin was on 11/12/2019. CEA 4.3 on 11/12/2019. Cycle # 80 FOLFIRI + Avastin was on 12/10/2019. CEA 4.0 on 12/09/2019.   CT chest 12/19/2019 Stable 3 mm nodule within the left upper lobe, similar to the previous studies dating back to 11/2/2018, however decreased in size compared with metastatic rectosigmoid cancer. CEA 3488 on 10/30/2015. Bone scan on 11/10/2015 noted no metastatic disease. CT chest on 11/10/2015 revealed Multiple pulmonary nodules in the upper and lower lobes consistent with metastatic disease; Hepatic metastasis also visualized. 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 Mediport was placed. Cycle # 1 of FOLFOX + Avastin was on 11/23/2015. CEA was 4555 on 11/23/2015. Cycle # 2 of FOLFOX + Avastin was on 12/08/2015. CEA was 3160 on 12/08/2015. Cycle # 3 of FOLFOX + Avastin was on 12/22/2015. CEA was 2516 on 12/21/2015. Cycle # 4 of FOLFOX + Avastin was on 01/05/2016. CEA was 2098 on 01/05/2015. Cycle # 5 of FOLFOX + Avastin was on 01/19/2016. CEA was 1511 on 01/05/2015.     -Bone scan on 01/26/2016 noted no metastatic disease. CT chest on 01/26/2016 revealed Significant response to treatment with no visible residual nodules. CT scan abdomen/pelvis on 01/26/2016 noted Interval decreased size of multiple masses in the liver compatible with treatment response. Continue another 2 months of FOLFOX + Avastin and repeat scans. Cycle # 6 of FOLFOX + Avastin was on 02/02/2016.  on 02/02/2016. Cycle # 7 of FOLFOX + Avastin was on 02/16/2016.  on 02/16/2016. Cycle # 8 of FOLFOX + Avastin was on 03/01/2016.  on 03/01/2016. Cycle # 9 of FOLFOX + Avastin was on 03/15/2016.  on 03/15/2016. Cycle # 10 of FOLFOX + Avastin was on 03/29/2016. .4 on 03/29/2016. Cycle # 11 of FOLFOX + Avastin was on 04/12/2016. .4 on 04/12/2016.     Re-staging scans on 04/19/2016: CT Chest: clear lungs; no evidence of recurring pulmonary nodule; CT Abdomen/Pelvis: Further interval decrease in size of the multiple metastatic hepatic lesions, the largest lesion now measures 3.9 x 3.5 cm and previously measured 4.5 cm in maximum diameter.  No mesenteric lymphadenopathy is identified; Bone Scan: No evidence CEA 26.7 on 08/30/2016. Cycle # 4 FOLFIRI/Avastin was on 09/13/2016. CEA 23.4 on 09/13/2016. Cycle # 5 FOLFIRI/Avastin was on 09/27/2016. CEA 22.3 on 09/27/2016. Cycle # 6 FOLFIRI/Avastin was on 10/11/2016. CEA 18.4 on 10/11/2016.      Bone scan 10/21/2016 stable bone metastasis; ? New lesion anterior left sixth rib likely interval healing fracture. CT abdomen/pelvis revealed stable hepatic metastasis. CT chest negative for metastatic disease. Continue FOLFIRI/Avastin and repeat scans in 3 months. Cycle # 7 FOLFIRI/Avastin was on 10/25/2016. CEA 16.1 on 10/25/2016. Cycle # 8 FOLFIRI/Avastin was on 11/08/2016. CEA 15.7 on 11/08/2016. Cycle # 9 FOLFIRI/Avastin was on 11/29/2016. CEA 13.6 on 11/29/2016. Cycle # 10 FOLFIRI/Avastin was on 12/13/2016. CEA 10.6 on 12/13/2016. Cycle # 11 FOLFIRI/Avastin was on 12/27/2016. CEA 11.1 on 12/27/2016. Cycle # 12 FOLFIRI/Avastin was on 01/10/2017. CEA 9.7 on 01/10/2017. CT chest 01/23/2017 No new pulmonary nodule or lymphadenopathy. Patchy groundglass opacities within the left lower lobe, suggestive of infectious or inflammatory etiology. Followup CT chest in 3 months. Slight increased linear sclerosis along the left anterior sixth rib corresponding to an area of increased uptake on the prior bone scan from 10/21/2016, favored to be related to interval healing changes of a nondisplaced fracture. CT abdomen/pelvis on 01/23/2017 Redemonstration of multiple hepatic metastases, which are similar compared to the prior CT from 10/21/2016. Redemonstration of area of increased sclerosis along the right acetabulum suspicious for metastatic disease. Bone scan on 01/23/2017 Stable subtle uptake within the left proximal diaphysis, again suggestive of metastatic disease  Decreased subtle uptake within the medial right acetabulum.    Decreased uptake within the left anterior sixth rib corresponding to linear sclerosis on the recent CT, favored to be related to an + Avastin was on 08/22/2017. CEA 5.9 on 08/22/2017. Cycle # 29 FOLFIRI + Avastin was on 09/05/2017. CEA 6.3 on 09/05/2017. Cycle # 30 FOLFIRI + Avastin was on 09/19/2017. CEA 6.3 on 09/19/2017. Bone scan 09/26/2017 stable. CT abdomen/pelvis on 09/26/2017 Stable hypodense mass in the liver. Stable sclerotic lesion in the acetabulum. CT chest 09/26/2017 negative for metastatic disease. Cycle # 31 FOLFIRI + Avastin was on 10/03/2017. CEA 7.4 on 10/03/2017. Cycle # 32 FOLFIRI + Avastin was on 10/17/2017. CEA 6.0 on 10/17/2017. Cycle # 33 FOLFIRI + Avastin was on 10/31/2017. CEA 6.8 on 10/31/2017. Cycle # 34 FOLFIRI + Avastin was on 11/14/2017. CEA 7.2 on 11/14/2017. Cycle # 35 FOLFIRI + Avastin was on 11/28/2017. CEA 5.1 on 11/28/2017. Cycle # 36 FOLFIRI + Avastin was on 12/12/2017. CEA 6.1 on 12/12/2017. Bone scan 12/22/2017 noted no evidence of metastatic disease to the axial or appendicular skeleton. CT chest 12/22/2017 noted no evidence of metastatic disease. CT abdomen/pelvis 12/22/2017 noted stable hypodense lesions in the liver. Continue FOLFIRI + Avastin and repeat scans in 3 months. Cycle # 37 FOLFIRI + Avastin was on 12/27/2018. CEA 6.7 on 12/27/2017. Cycle # 38 FOLFIRI + Avastin was on 01/09/2018. CEA 5.0 on 01/09/2018. Cycle # 39 FOLFIRI + Avastin was on 01/23/2018. CEA 6.5 on 01/23/2018. Cycle # 40 FOLFIRI + Avastin was on 02/06/2018. CEA 6.9 on 02/06/2018. Cycle # 41 FOLFIRI + Avastin was on 02/20/2018. CEA 6.4 on 02/20/2018. Cycle # 42 FOLFIRI + Avastin was on 03/06/2018. CEA 6.6 on 03/06/2018. Cycle # 43 FOLFIRI + Avastin was on 03/20/2018. CEA 5.7 on 03/20/2018. Bone scan on 03/26/2018 negative for metastatic disease. CT chest 03/26/2018 noted ? new 7 mm nodule in LUCY. CT abdomen/pelvis 03/26/2018 noted stable hypodense lesions in the liver. ? New small ascites in the abdomen.   Patient wants to continue to monitor the lung finding and repeat scans in 2 months instead of LUCY. No new right and left lung nodule. No mediastinal or osseous lesion. CT abdomen/pelvis 11/02/2018 stable metastatic disease to liver. No new metastatic disease identified. No mesenteric or retroperitoneal LN. No gross colonic lesion identified. Continue FOLFIRI + Avastin and repeat scans in 3 months. Cycle # 58 FOLFIRI + Avastin was on 11/06/2018. CEA 7.6 on 11/05/2018. Cycle # 59 FOLFIRI + Avastin was on 11/27/2018. CEA 6.9 on 11/26/2018. Cycle # 60 FOLFIRI + Avastin was on 12/11/2018. CEA 6.7 on 12/11/2018. Cycle # 61 FOLFIRI + Avastin was on 01/08/2019. CEA 5.6 on 01/07/2019. Cycle # 62 FOLFIRI + Avastin was on 01/29/2019. CEA 4.6 on 01/28/2019. Cycle # 63 FOLFIRI + Avastin was on 02/12/2019. CEA 4.3 on 02/11/2019. CT chest 02/21/2019 no evidence of metastatic disease. CT abdomen/pelvis 02/21/2019 stable hypodense liver lesions. No evidence of worsening metastatic disease. 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 Ordered MRI thoracic spine and referred to neurosurgery team.  Cycle # 64 FOLFIRI + Avastin was on 02/26/2019. CEA 4.7 on 02/25/2019. Cycle # 65 FOLFIRI + Avastin was on 03/12/2019. CEA 4.0 on 03/11/2019. Cycle # 66 FOLFIRI + Avastin was on 03/26/2019. CEA 4.7 on 03/25/2019. Cycle # 67 FOLFIRI + Avastin was on 04/09/2019. CEA 5.6 on 04/08/2019. Cycle # 68 FOLFIRI + Avastin was on 04/30/2019. CEA 4.9 on 04/29/2019. Cycle # 69 FOLFIRI + Avastin was on 05/14/2019. CEA 5.3 on 05/14/2019. CT chest 05/23/2019 stable small lung nodule with no evidence of metastatic disease. CT abdomen/pelvis 05/23/2019 Decrease conspicuity of the liver lesions. No new lesions seen. Stable splenomegaly. Continue FOLFIRI + Avastin and repeat scans in 3 months. Cycle # 70 FOLFIRI + Avastin was on 06/04/2019. CEA 4.8 on 06/03/2019. Cycle # 71 FOLFIRI + Avastin was on 06/18/2019. CEA 4.6 on 06/17/2019. Cycle # 72 FOLFIRI + Avastin was on 07/09/2019.  CEA 4.3 on

## 2020-06-02 NOTE — PROGRESS NOTES
Patient presents to clinic today for Xgeva injection. Dental clearance has been obtained and therapy can be resumed. Patient's labs monitored, specifically, Calcium level, to ensure no increased risk of hypocalcemia with administration of the medication. Patient's calcium level is 8.3 mg/dL and corrected calcium is 8.78 mg/dL. Patient received therapy and will continue to be monitored prior to each dose.      Mac Egan, [id number] 6/2/2020 9:49 AM

## 2020-06-02 NOTE — PROGRESS NOTES
Chemotherapy pump orders faxed to [facility] @ [phone] and call placed to [phone] and spoke with Maribel Cutler to confirm receipt. The pump will be administered in the home and de accessed in 46 hrs. Encouraged to call with questions/concerns.

## 2020-06-04 ENCOUNTER — HOSPITAL ENCOUNTER (OUTPATIENT)
Dept: INFUSION THERAPY | Age: 70
Discharge: HOME | End: 2020-06-04
Payer: MEDICARE

## 2020-06-04 DIAGNOSIS — C78.7 RECTAL CANCER METASTASIZED TO LIVER (HCC): ICD-10-CM

## 2020-06-04 DIAGNOSIS — C20 RECTAL CANCER METASTASIZED TO LIVER (HCC): ICD-10-CM

## 2020-06-04 DIAGNOSIS — Z51.11 ENCOUNTER FOR ANTINEOPLASTIC CHEMOTHERAPY: Primary | ICD-10-CM

## 2020-06-04 PROCEDURE — 96372 THER/PROPH/DIAG INJ SC/IM: CPT

## 2020-06-04 PROCEDURE — 6360000002 HC RX W HCPCS: Performed by: INTERNAL MEDICINE

## 2020-06-04 RX ADMIN — PEGFILGRASTIM 6 MG: 6 INJECTION SUBCUTANEOUS at 13:40

## 2020-06-15 ENCOUNTER — HOSPITAL ENCOUNTER (OUTPATIENT)
Age: 70
Discharge: HOME | End: 2020-06-15
Payer: MEDICARE

## 2020-06-15 LAB
ALBUMIN SERPL-MCNC: 3.6 G/DL (ref 3.5–5.2)
ALP BLD-CCNC: 170 U/L (ref 40–129)
ALT SERPL-CCNC: 25 U/L (ref 0–40)
ANION GAP SERPL CALCULATED.3IONS-SCNC: 11 MMOL/L (ref 7–16)
ANISOCYTOSIS: ABNORMAL
AST SERPL-CCNC: 32 U/L (ref 0–39)
BASOPHILS ABSOLUTE: 0.02 E9/L (ref 0–0.2)
BASOPHILS RELATIVE PERCENT: 0.9 % (ref 0–2)
BILIRUB SERPL-MCNC: 0.9 MG/DL (ref 0–1.2)
BUN BLDV-MCNC: 16 MG/DL (ref 8–23)
CALCIUM SERPL-MCNC: 9.5 MG/DL (ref 8.6–10.2)
CEA: 5.6 NG/ML (ref 0–5.2)
CHLORIDE BLD-SCNC: 105 MMOL/L (ref 98–107)
CO2: 26 MMOL/L (ref 22–29)
CREAT SERPL-MCNC: 1.1 MG/DL (ref 0.7–1.2)
EOSINOPHILS ABSOLUTE: 0.16 E9/L (ref 0.05–0.5)
EOSINOPHILS RELATIVE PERCENT: 7.8 % (ref 0–6)
FERRITIN: 393 NG/ML
FOLATE: >20 NG/ML (ref 4.8–24.2)
GFR AFRICAN AMERICAN: >60
GFR NON-AFRICAN AMERICAN: >60 ML/MIN/1.73
GLUCOSE BLD-MCNC: 125 MG/DL (ref 74–99)
HCT VFR BLD CALC: 26.6 % (ref 37–54)
HEMOGLOBIN: 8.4 G/DL (ref 12.5–16.5)
IRON SATURATION: 30 % (ref 20–55)
IRON: 45 MCG/DL (ref 59–158)
LYMPHOCYTES ABSOLUTE: 0.28 E9/L (ref 1.5–4)
LYMPHOCYTES RELATIVE PERCENT: 13.9 % (ref 20–42)
MCH RBC QN AUTO: 32.8 PG (ref 26–35)
MCHC RBC AUTO-ENTMCNC: 31.6 % (ref 32–34.5)
MCV RBC AUTO: 103.9 FL (ref 80–99.9)
MONOCYTES ABSOLUTE: 0.08 E9/L (ref 0.1–0.95)
MONOCYTES RELATIVE PERCENT: 3.5 % (ref 2–12)
NEUTROPHILS ABSOLUTE: 1.48 E9/L (ref 1.8–7.3)
NEUTROPHILS RELATIVE PERCENT: 73.9 % (ref 43–80)
OVALOCYTES: ABNORMAL
PDW BLD-RTO: 17.5 FL (ref 11.5–15)
PLATELET # BLD: 79 E9/L (ref 130–450)
PLATELET CONFIRMATION: NORMAL
PMV BLD AUTO: 11.4 FL (ref 7–12)
POIKILOCYTES: ABNORMAL
POLYCHROMASIA: ABNORMAL
POTASSIUM SERPL-SCNC: 3.6 MMOL/L (ref 3.5–5)
RBC # BLD: 2.56 E12/L (ref 3.8–5.8)
SODIUM BLD-SCNC: 142 MMOL/L (ref 132–146)
TEAR DROP CELLS: ABNORMAL
TOTAL IRON BINDING CAPACITY: 150 MCG/DL (ref 250–450)
TOTAL PROTEIN: 6.5 G/DL (ref 6.4–8.3)
VITAMIN B-12: >2000 PG/ML (ref 211–946)
WBC # BLD: 2 E9/L (ref 4.5–11.5)

## 2020-06-15 PROCEDURE — 82746 ASSAY OF FOLIC ACID SERUM: CPT

## 2020-06-15 PROCEDURE — 83540 ASSAY OF IRON: CPT

## 2020-06-15 PROCEDURE — 85025 COMPLETE CBC W/AUTO DIFF WBC: CPT

## 2020-06-15 PROCEDURE — 83550 IRON BINDING TEST: CPT

## 2020-06-15 PROCEDURE — 82378 CARCINOEMBRYONIC ANTIGEN: CPT

## 2020-06-15 PROCEDURE — 80053 COMPREHEN METABOLIC PANEL: CPT

## 2020-06-15 PROCEDURE — 36415 COLL VENOUS BLD VENIPUNCTURE: CPT

## 2020-06-15 PROCEDURE — 82607 VITAMIN B-12: CPT

## 2020-06-15 PROCEDURE — 82728 ASSAY OF FERRITIN: CPT

## 2020-06-16 ENCOUNTER — TELEPHONE (OUTPATIENT)
Dept: INFUSION THERAPY | Age: 70
End: 2020-06-16

## 2020-06-16 ENCOUNTER — OFFICE VISIT (OUTPATIENT)
Dept: ONCOLOGY | Age: 70
End: 2020-06-16
Payer: MEDICARE

## 2020-06-16 ENCOUNTER — HOSPITAL ENCOUNTER (OUTPATIENT)
Dept: INFUSION THERAPY | Age: 70
Discharge: HOME | End: 2020-06-16
Payer: MEDICARE

## 2020-06-16 VITALS
WEIGHT: 188.9 LBS | BODY MASS INDEX: 24.25 KG/M2 | DIASTOLIC BLOOD PRESSURE: 76 MMHG | HEART RATE: 64 BPM | OXYGEN SATURATION: 100 % | SYSTOLIC BLOOD PRESSURE: 123 MMHG | TEMPERATURE: 97.4 F

## 2020-06-16 VITALS — DIASTOLIC BLOOD PRESSURE: 64 MMHG | SYSTOLIC BLOOD PRESSURE: 117 MMHG | HEART RATE: 59 BPM

## 2020-06-16 DIAGNOSIS — C20 RECTAL CANCER METASTASIZED TO LIVER (HCC): Primary | ICD-10-CM

## 2020-06-16 DIAGNOSIS — C78.7 RECTAL CANCER METASTASIZED TO LIVER (HCC): Primary | ICD-10-CM

## 2020-06-16 DIAGNOSIS — Z51.11 ENCOUNTER FOR ANTINEOPLASTIC CHEMOTHERAPY: ICD-10-CM

## 2020-06-16 PROCEDURE — 99214 OFFICE O/P EST MOD 30 MIN: CPT | Performed by: INTERNAL MEDICINE

## 2020-06-16 PROCEDURE — 96375 TX/PRO/DX INJ NEW DRUG ADDON: CPT

## 2020-06-16 PROCEDURE — 96417 CHEMO IV INFUS EACH ADDL SEQ: CPT

## 2020-06-16 PROCEDURE — 2580000003 HC RX 258: Performed by: INTERNAL MEDICINE

## 2020-06-16 PROCEDURE — 96411 CHEMO IV PUSH ADDL DRUG: CPT

## 2020-06-16 PROCEDURE — 96415 CHEMO IV INFUSION ADDL HR: CPT

## 2020-06-16 PROCEDURE — 96368 THER/DIAG CONCURRENT INF: CPT

## 2020-06-16 PROCEDURE — 6360000002 HC RX W HCPCS: Performed by: INTERNAL MEDICINE

## 2020-06-16 PROCEDURE — 96413 CHEMO IV INFUSION 1 HR: CPT

## 2020-06-16 RX ORDER — PALONOSETRON HYDROCHLORIDE 0.05 MG/ML
0.25 INJECTION, SOLUTION INTRAVENOUS ONCE
Status: COMPLETED | OUTPATIENT
Start: 2020-06-16 | End: 2020-06-16

## 2020-06-16 RX ORDER — SODIUM CHLORIDE 0.9 % (FLUSH) 0.9 %
10 SYRINGE (ML) INJECTION PRN
Status: DISCONTINUED | OUTPATIENT
Start: 2020-06-16 | End: 2020-06-17 | Stop reason: HOSPADM

## 2020-06-16 RX ORDER — HEPARIN SODIUM (PORCINE) LOCK FLUSH IV SOLN 100 UNIT/ML 100 UNIT/ML
500 SOLUTION INTRAVENOUS PRN
Status: DISCONTINUED | OUTPATIENT
Start: 2020-06-16 | End: 2020-06-17 | Stop reason: HOSPADM

## 2020-06-16 RX ORDER — FLUOROURACIL 50 MG/ML
850 INJECTION, SOLUTION INTRAVENOUS ONCE
Status: COMPLETED | OUTPATIENT
Start: 2020-06-16 | End: 2020-06-16

## 2020-06-16 RX ORDER — PALONOSETRON HYDROCHLORIDE 0.05 MG/ML
0.25 INJECTION, SOLUTION INTRAVENOUS ONCE
Status: CANCELLED | OUTPATIENT
Start: 2020-06-16

## 2020-06-16 RX ORDER — ATROPINE SULFATE 0.4 MG/ML
0.4 AMPUL (ML) INJECTION ONCE
Status: COMPLETED | OUTPATIENT
Start: 2020-06-16 | End: 2020-06-16

## 2020-06-16 RX ORDER — FLUOROURACIL 50 MG/ML
850 INJECTION, SOLUTION INTRAVENOUS ONCE
Status: CANCELLED | OUTPATIENT
Start: 2020-06-16

## 2020-06-16 RX ORDER — HEPARIN SODIUM (PORCINE) LOCK FLUSH IV SOLN 100 UNIT/ML 100 UNIT/ML
500 SOLUTION INTRAVENOUS PRN
Status: CANCELLED | OUTPATIENT
Start: 2020-06-16

## 2020-06-16 RX ORDER — SODIUM CHLORIDE 9 MG/ML
250 INJECTION, SOLUTION INTRAVENOUS CONTINUOUS
Status: CANCELLED | OUTPATIENT
Start: 2020-06-16

## 2020-06-16 RX ORDER — SODIUM CHLORIDE 9 MG/ML
INJECTION, SOLUTION INTRAVENOUS CONTINUOUS
Status: CANCELLED | OUTPATIENT
Start: 2020-06-16

## 2020-06-16 RX ORDER — ATROPINE SULFATE 0.4 MG/ML
0.4 AMPUL (ML) INJECTION ONCE
Status: CANCELLED | OUTPATIENT
Start: 2020-06-16

## 2020-06-16 RX ORDER — METHYLPREDNISOLONE SODIUM SUCCINATE 125 MG/2ML
125 INJECTION, POWDER, LYOPHILIZED, FOR SOLUTION INTRAMUSCULAR; INTRAVENOUS ONCE
Status: CANCELLED | OUTPATIENT
Start: 2020-06-16

## 2020-06-16 RX ORDER — SODIUM CHLORIDE 0.9 % (FLUSH) 0.9 %
10 SYRINGE (ML) INJECTION PRN
Status: CANCELLED | OUTPATIENT
Start: 2020-06-16

## 2020-06-16 RX ORDER — DIPHENHYDRAMINE HYDROCHLORIDE 50 MG/ML
50 INJECTION INTRAMUSCULAR; INTRAVENOUS ONCE
Status: CANCELLED | OUTPATIENT
Start: 2020-06-16

## 2020-06-16 RX ORDER — 0.9 % SODIUM CHLORIDE 0.9 %
10 VIAL (ML) INJECTION ONCE
Status: CANCELLED | OUTPATIENT
Start: 2020-06-16

## 2020-06-16 RX ORDER — FUROSEMIDE 20 MG/1
20 TABLET ORAL DAILY
COMMUNITY
Start: 2020-06-02

## 2020-06-16 RX ORDER — SODIUM CHLORIDE 9 MG/ML
250 INJECTION, SOLUTION INTRAVENOUS CONTINUOUS
Status: DISCONTINUED | OUTPATIENT
Start: 2020-06-16 | End: 2020-06-17 | Stop reason: HOSPADM

## 2020-06-16 RX ORDER — DEXAMETHASONE SODIUM PHOSPHATE 10 MG/ML
10 INJECTION INTRAMUSCULAR; INTRAVENOUS ONCE
Status: COMPLETED | OUTPATIENT
Start: 2020-06-16 | End: 2020-06-16

## 2020-06-16 RX ORDER — EPINEPHRINE 1 MG/ML
0.3 INJECTION, SOLUTION, CONCENTRATE INTRAVENOUS PRN
Status: CANCELLED | OUTPATIENT
Start: 2020-06-16

## 2020-06-16 RX ORDER — DEXAMETHASONE SODIUM PHOSPHATE 10 MG/ML
10 INJECTION, SOLUTION INTRAMUSCULAR; INTRAVENOUS ONCE
Status: CANCELLED | OUTPATIENT
Start: 2020-06-16

## 2020-06-16 RX ADMIN — SODIUM CHLORIDE 400 MG: 9 INJECTION, SOLUTION INTRAVENOUS at 09:55

## 2020-06-16 RX ADMIN — SODIUM CHLORIDE 250 ML: 9 INJECTION, SOLUTION INTRAVENOUS at 08:35

## 2020-06-16 RX ADMIN — FLUOROURACIL 850 MG: 50 INJECTION, SOLUTION INTRAVENOUS at 11:41

## 2020-06-16 RX ADMIN — DEXAMETHASONE SODIUM PHOSPHATE 10 MG: 10 INJECTION INTRAMUSCULAR; INTRAVENOUS at 08:39

## 2020-06-16 RX ADMIN — ATROPINE SULFATE 0.4 MG: 0.4 INJECTION, SOLUTION INTRAMUSCULAR; INTRAVENOUS; SUBCUTANEOUS at 09:14

## 2020-06-16 RX ADMIN — PALONOSETRON 0.25 MG: 0.25 INJECTION, SOLUTION INTRAVENOUS at 08:38

## 2020-06-16 RX ADMIN — LEUCOVORIN CALCIUM 850 MG: 350 INJECTION, POWDER, LYOPHILIZED, FOR SOLUTION INTRAMUSCULAR; INTRAVENOUS at 09:55

## 2020-06-16 RX ADMIN — Medication 500 UNITS: at 11:48

## 2020-06-16 RX ADMIN — BEVACIZUMAB 400 MG: 400 INJECTION, SOLUTION INTRAVENOUS at 09:17

## 2020-06-16 RX ADMIN — SODIUM CHLORIDE, PRESERVATIVE FREE 10 ML: 5 INJECTION INTRAVENOUS at 11:48

## 2020-06-16 NOTE — PROGRESS NOTES
type.     CEA 3488. Bone scan on 11/10/2015 noted no metastatic disease. CT chest on 11/10/2015 revealed Multiple pulmonary nodules in the upper and lower lobes consistent with metastatic disease;   Hepatic metastasis also visualized. 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 Mediport was placed. Cycle # 1 of FOLFOX + Avastin was on 11/23/2015. CEA was 4555 on 11/23/2015. Cycle # 2 of FOLFOX + Avastin was on 12/08/2015. CEA was 3160 on 12/08/2015. Cycle # 3 of FOLFOX + Avastin was on 12/22/2015. CEA was 2516 on 12/21/2015. Cycle # 4 of FOLFOX + Avastin was on 01/05/2016. CEA was 2098 on 01/05/2015. Cycle # 5 of FOLFOX + Avastin was on 01/19/2016. CEA was 1511 on 01/05/2015.     -Bone scan on 01/26/2016 noted no metastatic disease. CT chest on 01/26/2016 revealed Significant response to treatment with no visible residual nodules. CT scan abdomen/pelvis on 01/26/2016 noted Interval decreased size of multiple masses in the liver compatible with treatment response. Continue another 2 months of FOLFOX + Avastin and repeat scans. Cycle # 6 of FOLFOX + Avastin was on 02/02/2016.  on 02/02/2016. Cycle # 7 of FOLFOX + Avastin was on 02/16/2016.  on 02/16/2016. Cycle # 8 of FOLFOX + Avastin was on 03/01/2016.  on 03/01/2016. Cycle # 9 of FOLFOX + Avastin was on 03/15/2016.  on 03/15/2016. Cycle # 10 of FOLFOX + Avastin was on 03/29/2016. .4 on 03/29/2016. Cycle # 11 of FOLFOX + Avastin was on 04/12/2016. .4 on 04/12/2016.     Re-staging scans on 04/19/2016: CT Chest: clear lungs; no evidence of recurring pulmonary nodule; CT Abdomen/Pelvis: Further interval decrease in size of the multiple metastatic hepatic lesions, the largest lesion now measures 3.9 x 3.5 cm and previously measured 4.5 cm in maximum diameter. No mesenteric lymphadenopathy is identified; Bone Scan: No evidence of osseous metastasis.   Continue same on 07/03/2018. Cycle # 51 FOLFIRI + Avastin was on 07/24/2018. Cycle # 52 FOLFIRI + Avastin was on 08/14/2018. Cycle # 53 FOLFIRI + Avastin was on 08/28/2018. CT chest 09/06/2018 noted interval decrease in size of LUCY measuring up to 4 mm, suggestive of treatment response. No mediastinal or hilar LN  CT abdomen/pelvis 09/06/2018 Slight interval increase in size of a previously noted metastatic lesion within the right hepatic lobe. This may be related to differences in phase of enhancement compared to the most recent study from 5/31/2018, the lesion remains decreased in size compared to the more previous studies. No abdominal, retroperitoneal, or pelvic lymphadenopathy. Continue FOLFIRI + Avastin and repeat scans in 2 months. Cycle # 54 FOLFIRI + Avastin was on 09/11/2018. Cycle # 55 FOLFIRI + Avastin was on 09/25/2018. Cycle # 56 FOLFIRI + Avastin was on 10/09/2018. Cycle # 57 FOLFIRI + Avastin was on 10/23/2018. CT chest 11/02/2018 stable 3 mm nodule within LUCY. No new right and left lung nodule. No mediastinal or osseous lesion. CT abdomen/pelvis 11/02/2018 stable metastatic disease to liver. No new metastatic disease identified. No mesenteric or retroperitoneal LN. No gross colonic lesion identified. Continue FOLFIRI + Avastin and repeat scans in 3 months. Cycle # 58 FOLFIRI + Avastin was on 11/06/2018. CEA 7.6 on 11/05/2018. Cycle # 59 FOLFIRI + Avastin was on 11/27/2018. CEA 6.9 on 11/26/2018. Cycle # 60 FOLFIRI + Avastin was on 12/11/2018. CEA 6.7 on 12/11/2018. Cycle # 61 FOLFIRI + Avastin was on 01/08/2019. CEA 5.6 on 01/07/2019. Cycle # 62 FOLFIRI + Avastin was on 01/29/2019. CEA 4.6 on 01/28/2019. Cycle # 63 FOLFIRI + Avastin was on 02/12/2019. CEA 4.3 on 02/11/2019. CT chest 02/21/2019 no evidence of metastatic disease. CT abdomen/pelvis 02/21/2019 stable hypodense liver lesions. No evidence of worsening metastatic disease.  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 Ordered MRI thoracic spine and referred to neurosurgery team.    Cycle # 64 FOLFIRI + Avastin was on 02/26/2019. CEA 4.7 on 02/25/2019. Cycle # 65 FOLFIRI + Avastin was on 03/12/2019. CEA 4.0 on 03/11/2019. Cycle # 66 FOLFIRI + Avastin was on 03/26/2019. CEA 4.7 on 03/25/2019. Cycle # 67 FOLFIRI + Avastin was on 04/09/2019. CEA 5.6 on 04/08/2019. Cycle # 68 FOLFIRI + Avastin was on 04/30/2019. CEA 4.9 on 04/29/2019. Cycle # 69 FOLFIRI + Avastin was on 05/14/2019. CEA 5.3 on 05/14/2019. CT chest 05/23/2019 stable small lung nodule with no evidence of metastatic disease. CT abdomen/pelvis 05/23/2019 Decrease conspicuity of the liver lesions. No new lesions seen. Stable splenomegaly. Continue FOLFIRI + Avastin and repeat scans in 3 months. Cycle # 70 FOLFIRI + Avastin was on 06/04/2019. CEA 4.8 on 06/03/2019. Cycle # 71 FOLFIRI + Avastin was on 06/18/2019. CEA 4.6 on 06/17/2019. Cycle # 72 FOLFIRI + Avastin was on 07/09/2019. CEA 4.3 on 07/08/2019. Cycle # 73 FOLFIRI + Avastin was on 07/30/2019. CEA 5.0 on 07/29/2019. Cycle # 74 FOLFIRI + Avastin was on 08/13/2019. CEA 5.0 on 08/12/2019. CT chest 08/20/2019 negative  CT abdomen/pelvis 08/20/2019 Previous identified hepatic lesions are not visualized on the current exam.  Continue FOLFIRI + Avastin and repeat scans in 3 months. Cycle # 75 FOLFIRI + Avastin was on 08/27/2019. CEA 5.0 on 08/26/2019. Cycle # 76 FOLFIRI + Avastin was on 09/17/2019. CEA 5.5 on 09/16/2019. Cycle # 77 FOLFIRI + Avastin was on 10/15/2019. CEA 4.6 on 10/15/2019. Cycle # 78 FOLFIRI + Avastin was on 10/29/2019. CEA 4.1 on 10/28/2019. Cycle # 79 FOLFIRI + Avastin was on 11/12/2019. CEA 4.3 on 11/12/2019. Cycle # 80 FOLFIRI + Avastin was on 12/10/2019. CEA 4.0 on 12/09/2019.   CT chest 12/19/2019 Stable 3 mm nodule within the left upper lobe, similar to the previous studies dating back to 11/2/2018, however decreased in size compared uptake within the left anterior sixth rib corresponding to linear sclerosis on the recent CT, favored to be related to an joint rib fracture. Continue FOLFIRI + Avastin and repeat scans in 3 months. Cycle # 13 FOLFIRI + Avastin was on 01/24/2017. Cycle # 14 FOLFIRI + Avastin was on 02/07/2017. Cycle # 15 FOLFIRI + Avastin was on 02/21/2017. Cycle # 16 FOLFIRI + Avastin was on 03/07/2017. Cycle # 17 FOLFIRI + Avastin was on 03/21/2017. Cycle # 18 FOLFIRI + Avastin was on 04/04/2017. CT chest 04/17/2017 negative for metastatic disease. CT abdomen/pelvis 04/17/2017 Stable hypodense metastatic lesions within the liver. Stable area of increased sclerosis along the right acetabulum  Bone scan 04/17/2017 Stable subtle uptake within the left proximal femoral diaphysis; No definite abnormal uptake in the region of a sclerotic lesion along the posterior column of the right acetabulum noted on CT. Stable slight uptake within the left anterior sixth rib corresponding to linear sclerosis on the recent CT, favored to be related to a fracture. Continue FOLFIRI + Avastin and repeat scans in 3 months. Cycle # 19 FOLFIRI + Avastin was on 04/18/2017. CEA 7.5 on 04/18/2017. Cycle # 20 FOLFIRI + Avastin was on 05/02/2017. CEA 7.7 on 05/02/2017. Cycle # 21 FOLFIRI + Avastin was on 05/16/2017. CEA 7.1 on 05/16/2017. Cycle # 22 FOLFIRI + Avastin was on 05/30/2017. CEA 8.2 on 05/30/2017. Cycle # 23 FOLFIRI + Avastin was on 06/13/2017. CEA 7.7 on 06/13/2017. Cycle # 24 FOLFIRI + Avastin was on 06/27/2017. CEA 6.6 on 06/27/2017. Re-staging scans 07/05/2017:  Bone scan stable proximal left femoral diaphysis, right acetabulum, and left anterior sixth rib lesions;  CT abdomen/pelvis stable hypodense metastatic lesions within the liver; CT chest without convincing evidence of metastatic disease. Cycle # 25 FOLFIRI + Avastin was on 07/11/2017. CEA 6.3 on 07/11/2017.   Cycle # 26 FOLFIRI + Avastin was on 07/25/2017. CEA 7.3 on 07/25/2017. Cycle # 27 FOLFIRI + Avastin was on 08/08/2017. CEA 6.5 on 08/08/2017. Cycle # 28 FOLFIRI + Avastin was on 08/22/2017. CEA 5.9 on 08/22/2017. Cycle # 29 FOLFIRI + Avastin was on 09/05/2017. CEA 6.3 on 09/05/2017. Cycle # 30 FOLFIRI + Avastin was on 09/19/2017. CEA 6.3 on 09/19/2017. Bone scan 09/26/2017 stable. CT abdomen/pelvis on 09/26/2017 Stable hypodense mass in the liver. Stable sclerotic lesion in the acetabulum. CT chest 09/26/2017 negative for metastatic disease. Cycle # 31 FOLFIRI + Avastin was on 10/03/2017. CEA 7.4 on 10/03/2017. Cycle # 32 FOLFIRI + Avastin was on 10/17/2017. CEA 6.0 on 10/17/2017. Cycle # 33 FOLFIRI + Avastin was on 10/31/2017. CEA 6.8 on 10/31/2017. Cycle # 34 FOLFIRI + Avastin was on 11/14/2017. CEA 7.2 on 11/14/2017. Cycle # 35 FOLFIRI + Avastin was on 11/28/2017. CEA 5.1 on 11/28/2017. Cycle # 36 FOLFIRI + Avastin was on 12/12/2017. CEA 6.1 on 12/12/2017. Bone scan 12/22/2017 noted no evidence of metastatic disease to the axial or appendicular skeleton. CT chest 12/22/2017 noted no evidence of metastatic disease. CT abdomen/pelvis 12/22/2017 noted stable hypodense lesions in the liver. Continue FOLFIRI + Avastin and repeat scans in 3 months. Cycle # 37 FOLFIRI + Avastin was on 12/27/2018. CEA 6.7 on 12/27/2017. Cycle # 38 FOLFIRI + Avastin was on 01/09/2018. CEA 5.0 on 01/09/2018. Cycle # 39 FOLFIRI + Avastin was on 01/23/2018. CEA 6.5 on 01/23/2018. Cycle # 40 FOLFIRI + Avastin was on 02/06/2018. CEA 6.9 on 02/06/2018. Cycle # 41 FOLFIRI + Avastin was on 02/20/2018. CEA 6.4 on 02/20/2018. Cycle # 42 FOLFIRI + Avastin was on 03/06/2018. CEA 6.6 on 03/06/2018. Cycle # 43 FOLFIRI + Avastin was on 03/20/2018. CEA 5.7 on 03/20/2018. Bone scan on 03/26/2018 negative for metastatic disease. CT chest 03/26/2018 noted ? new 7 mm nodule in LUCY. CT abdomen/pelvis 03/26/2018 noted stable hypodense lesions in the liver.  ? New 10/08/2018. Cycle # 57 FOLFIRI + Avastin was on 10/23/2018. CEA 7.2 on 10/22/2018. CT chest 11/02/2018 stable 3 mm nodule within LUCY. No new right and left lung nodule. No mediastinal or osseous lesion. CT abdomen/pelvis 11/02/2018 stable metastatic disease to liver. No new metastatic disease identified. No mesenteric or retroperitoneal LN. No gross colonic lesion identified. Continue FOLFIRI + Avastin and repeat scans in 3 months. Cycle # 58 FOLFIRI + Avastin was on 11/06/2018. CEA 7.6 on 11/05/2018. Cycle # 59 FOLFIRI + Avastin was on 11/27/2018. CEA 6.9 on 11/26/2018. Cycle # 60 FOLFIRI + Avastin was on 12/11/2018. CEA 6.7 on 12/11/2018. Cycle # 61 FOLFIRI + Avastin was on 01/08/2019. CEA 5.6 on 01/07/2019. Cycle # 62 FOLFIRI + Avastin was on 01/29/2019. CEA 4.6 on 01/28/2019. Cycle # 63 FOLFIRI + Avastin was on 02/12/2019. CEA 4.3 on 02/11/2019. CT chest 02/21/2019 no evidence of metastatic disease. CT abdomen/pelvis 02/21/2019 stable hypodense liver lesions. No evidence of worsening metastatic disease. 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 Ordered MRI thoracic spine and referred to neurosurgery team.  Cycle # 64 FOLFIRI + Avastin was on 02/26/2019. CEA 4.7 on 02/25/2019. Cycle # 65 FOLFIRI + Avastin was on 03/12/2019. CEA 4.0 on 03/11/2019. Cycle # 66 FOLFIRI + Avastin was on 03/26/2019. CEA 4.7 on 03/25/2019. Cycle # 67 FOLFIRI + Avastin was on 04/09/2019. CEA 5.6 on 04/08/2019. Cycle # 68 FOLFIRI + Avastin was on 04/30/2019. CEA 4.9 on 04/29/2019. Cycle # 69 FOLFIRI + Avastin was on 05/14/2019. CEA 5.3 on 05/14/2019. CT chest 05/23/2019 stable small lung nodule with no evidence of metastatic disease. CT abdomen/pelvis 05/23/2019 Decrease conspicuity of the liver lesions. No new lesions seen. Stable splenomegaly. Continue FOLFIRI + Avastin and repeat scans in 3 months. Cycle # 70 FOLFIRI + Avastin was on 06/04/2019. CEA 4.8 on 06/03/2019. Cycle # 71 FOLFIRI + Avastin was on 06/18/2019. CEA 4.6 on 06/17/2019. Cycle # 72 FOLFIRI + Avastin was on 07/09/2019. CEA 4.3 on 07/08/2019. Cycle # 73 FOLFIRI + Avastin was on 07/30/2019. CEA 5.0 on 07/29/2019. Cycle # 74 FOLFIRI + Avastin was on 08/13/2019. CEA 5.0 on 08/12/2019. CT chest 08/20/2019 negative  CT abdomen/pelvis 08/20/2019 Previous identified hepatic lesions are not visualized on the current exam.  Continue FOLFIRI + Avastin and repeat scans in 3 months. Cycle # 75 FOLFIRI + Avastin was on 08/27/2019. CEA 5.0 on 08/26/2019. Cycle # 76 FOLFIRI + Avastin was on 09/17/2019. CEA 5.5 on 09/16/2019. Cycle # 77 FOLFIRI + Avastin was on 10/15/2019. CEA 4.6 on 10/15/2019. Cycle # 78 FOLFIRI + Avastin was on 10/29/2019. CEA 4.1 on 10/28/2019. Cycle # 79 FOLFIRI + Avastin was on 11/12/2019. CEA 4.3 on 11/12/2019. Cycle # 80 FOLFIRI + Avastin was on 12/10/2019. CEA 4.0 on 12/09/2019. CT chest 12/19/2019 Stable 3 mm nodule within the left upper lobe, similar to the previous studies dating back to 11/2/2018, however decreased in size compared to the CT from 3/26/2018. No thoracic LN. CT abdomen/pelvis 12/19/2019 Previously described small hypodense lesions are more conspicuous on the current study compared to the recent study throughout the liver from 8/20/2019, however similar to the prior study from 2/21/2019. Findings may be related to differences in contrast opacification. No abdominal or pelvic lymphadenopathy. Continue FOLFIRI + Avastin and repeat scans in 2 months to f/u on liver lesions. Cycle # 81 FOLFIRI + Avastin was on 01/07/2020. CEA 3.8 on 01/06/2020. Cycle # 82 FOLFIRI + Avastin was on 01/21/2020. CEA 3.7 on 01/20/2020. Cycle # 83 FOLFIRI + Avastin was on 02/04/2020. CEA 4.1 on 02/03/2020. Cycle # 84 FOLFIRI + Avastin was on 02/18/2020. CEA 4.6 on 02/17/2020. CT chest 2/28/2020 no evidence of metastatic disease to the lungs and no mediastinal or hilar adenopathy.    CT

## 2020-06-18 ENCOUNTER — HOSPITAL ENCOUNTER (OUTPATIENT)
Dept: INFUSION THERAPY | Age: 70
Discharge: HOME | End: 2020-06-18
Payer: MEDICARE

## 2020-06-18 DIAGNOSIS — C20 RECTAL CANCER METASTASIZED TO LIVER (HCC): ICD-10-CM

## 2020-06-18 DIAGNOSIS — Z51.11 ENCOUNTER FOR ANTINEOPLASTIC CHEMOTHERAPY: Primary | ICD-10-CM

## 2020-06-18 DIAGNOSIS — C78.7 RECTAL CANCER METASTASIZED TO LIVER (HCC): ICD-10-CM

## 2020-06-18 PROCEDURE — 6360000002 HC RX W HCPCS: Performed by: INTERNAL MEDICINE

## 2020-06-18 PROCEDURE — 96372 THER/PROPH/DIAG INJ SC/IM: CPT

## 2020-06-18 RX ADMIN — PEGFILGRASTIM 6 MG: 6 INJECTION SUBCUTANEOUS at 12:26

## 2020-06-23 ENCOUNTER — HOSPITAL ENCOUNTER (OUTPATIENT)
Dept: CT IMAGING | Age: 70
Discharge: HOME | End: 2020-06-23
Payer: MEDICARE

## 2020-06-23 PROCEDURE — 74177 CT ABD & PELVIS W/CONTRAST: CPT

## 2020-06-23 PROCEDURE — 71260 CT THORAX DX C+: CPT

## 2020-06-23 PROCEDURE — 6360000004 HC RX CONTRAST MEDICATION: Performed by: RADIOLOGY

## 2020-06-23 RX ADMIN — IOHEXOL 50 ML: 240 INJECTION, SOLUTION INTRATHECAL; INTRAVASCULAR; INTRAVENOUS; ORAL at 14:19

## 2020-06-29 ENCOUNTER — HOSPITAL ENCOUNTER (OUTPATIENT)
Age: 70
Discharge: HOME | End: 2020-06-29
Payer: MEDICARE

## 2020-06-29 LAB
ALBUMIN SERPL-MCNC: 3.7 G/DL (ref 3.5–5.2)
ALP BLD-CCNC: 167 U/L (ref 40–129)
ALT SERPL-CCNC: 20 U/L (ref 0–40)
ANION GAP SERPL CALCULATED.3IONS-SCNC: 11 MMOL/L (ref 7–16)
ANISOCYTOSIS: ABNORMAL
AST SERPL-CCNC: 29 U/L (ref 0–39)
BASOPHILS ABSOLUTE: 0 E9/L (ref 0–0.2)
BASOPHILS RELATIVE PERCENT: 0.6 % (ref 0–2)
BILIRUB SERPL-MCNC: 0.8 MG/DL (ref 0–1.2)
BUN BLDV-MCNC: 11 MG/DL (ref 8–23)
CALCIUM SERPL-MCNC: 9 MG/DL (ref 8.6–10.2)
CEA: 6.9 NG/ML (ref 0–5.2)
CHLORIDE BLD-SCNC: 106 MMOL/L (ref 98–107)
CO2: 26 MMOL/L (ref 22–29)
CREAT SERPL-MCNC: 1.1 MG/DL (ref 0.7–1.2)
EOSINOPHILS ABSOLUTE: 0.02 E9/L (ref 0.05–0.5)
EOSINOPHILS RELATIVE PERCENT: 0.9 % (ref 0–6)
GFR AFRICAN AMERICAN: >60
GFR NON-AFRICAN AMERICAN: >60 ML/MIN/1.73
GLUCOSE BLD-MCNC: 102 MG/DL (ref 74–99)
HCT VFR BLD CALC: 24.3 % (ref 37–54)
HEMOGLOBIN: 7.7 G/DL (ref 12.5–16.5)
LYMPHOCYTES ABSOLUTE: 0.34 E9/L (ref 1.5–4)
LYMPHOCYTES RELATIVE PERCENT: 19.1 % (ref 20–42)
MCH RBC QN AUTO: 33 PG (ref 26–35)
MCHC RBC AUTO-ENTMCNC: 31.7 % (ref 32–34.5)
MCV RBC AUTO: 104.3 FL (ref 80–99.9)
MONOCYTES ABSOLUTE: 0.13 E9/L (ref 0.1–0.95)
MONOCYTES RELATIVE PERCENT: 7 % (ref 2–12)
MYELOCYTE PERCENT: 1.7 % (ref 0–0)
NEUTROPHILS ABSOLUTE: 1.31 E9/L (ref 1.8–7.3)
NEUTROPHILS RELATIVE PERCENT: 71.3 % (ref 43–80)
OVALOCYTES: ABNORMAL
PDW BLD-RTO: 18 FL (ref 11.5–15)
PLATELET # BLD: 63 E9/L (ref 130–450)
PLATELET CONFIRMATION: NORMAL
PMV BLD AUTO: 9.8 FL (ref 7–12)
POIKILOCYTES: ABNORMAL
POLYCHROMASIA: ABNORMAL
POTASSIUM SERPL-SCNC: 3.7 MMOL/L (ref 3.5–5)
RBC # BLD: 2.33 E12/L (ref 3.8–5.8)
SODIUM BLD-SCNC: 143 MMOL/L (ref 132–146)
TEAR DROP CELLS: ABNORMAL
TOTAL PROTEIN: 6.1 G/DL (ref 6.4–8.3)
WBC # BLD: 1.8 E9/L (ref 4.5–11.5)

## 2020-06-29 PROCEDURE — 82378 CARCINOEMBRYONIC ANTIGEN: CPT

## 2020-06-29 PROCEDURE — 80053 COMPREHEN METABOLIC PANEL: CPT

## 2020-06-29 PROCEDURE — 36415 COLL VENOUS BLD VENIPUNCTURE: CPT

## 2020-06-29 PROCEDURE — 85025 COMPLETE CBC W/AUTO DIFF WBC: CPT

## 2020-06-30 ENCOUNTER — TELEPHONE (OUTPATIENT)
Dept: INFUSION THERAPY | Age: 70
End: 2020-06-30

## 2020-06-30 ENCOUNTER — OFFICE VISIT (OUTPATIENT)
Dept: ONCOLOGY | Age: 70
End: 2020-06-30
Payer: MEDICARE

## 2020-06-30 ENCOUNTER — HOSPITAL ENCOUNTER (OUTPATIENT)
Dept: INFUSION THERAPY | Age: 70
Discharge: HOME | End: 2020-06-30
Payer: MEDICARE

## 2020-06-30 VITALS
HEIGHT: 72 IN | WEIGHT: 186 LBS | SYSTOLIC BLOOD PRESSURE: 102 MMHG | HEART RATE: 62 BPM | DIASTOLIC BLOOD PRESSURE: 64 MMHG | TEMPERATURE: 98 F | OXYGEN SATURATION: 100 % | BODY MASS INDEX: 25.19 KG/M2

## 2020-06-30 PROCEDURE — 99215 OFFICE O/P EST HI 40 MIN: CPT | Performed by: INTERNAL MEDICINE

## 2020-06-30 PROCEDURE — 99212 OFFICE O/P EST SF 10 MIN: CPT

## 2020-06-30 NOTE — TELEPHONE ENCOUNTER
Updated Regina Kasper, from [place] [id number], [phone], with regards to Dr. Coto Press will be holding patient's chemotherapy this week and so no pump today and patient will return on 7-7-20. He voiced understanding.

## 2020-06-30 NOTE — PROGRESS NOTES
type.     CEA 3488. Bone scan on 11/10/2015 noted no metastatic disease. CT chest on 11/10/2015 revealed Multiple pulmonary nodules in the upper and lower lobes consistent with metastatic disease;   Hepatic metastasis also visualized. 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 Mediport was placed. Cycle # 1 of FOLFOX + Avastin was on 11/23/2015. CEA was 4555 on 11/23/2015. Cycle # 2 of FOLFOX + Avastin was on 12/08/2015. CEA was 3160 on 12/08/2015. Cycle # 3 of FOLFOX + Avastin was on 12/22/2015. CEA was 2516 on 12/21/2015. Cycle # 4 of FOLFOX + Avastin was on 01/05/2016. CEA was 2098 on 01/05/2015. Cycle # 5 of FOLFOX + Avastin was on 01/19/2016. CEA was 1511 on 01/05/2015.     -Bone scan on 01/26/2016 noted no metastatic disease. CT chest on 01/26/2016 revealed Significant response to treatment with no visible residual nodules. CT scan abdomen/pelvis on 01/26/2016 noted Interval decreased size of multiple masses in the liver compatible with treatment response. Continue another 2 months of FOLFOX + Avastin and repeat scans. Cycle # 6 of FOLFOX + Avastin was on 02/02/2016.  on 02/02/2016. Cycle # 7 of FOLFOX + Avastin was on 02/16/2016.  on 02/16/2016. Cycle # 8 of FOLFOX + Avastin was on 03/01/2016.  on 03/01/2016. Cycle # 9 of FOLFOX + Avastin was on 03/15/2016.  on 03/15/2016. Cycle # 10 of FOLFOX + Avastin was on 03/29/2016. .4 on 03/29/2016. Cycle # 11 of FOLFOX + Avastin was on 04/12/2016. .4 on 04/12/2016.     Re-staging scans on 04/19/2016: CT Chest: clear lungs; no evidence of recurring pulmonary nodule; CT Abdomen/Pelvis: Further interval decrease in size of the multiple metastatic hepatic lesions, the largest lesion now measures 3.9 x 3.5 cm and previously measured 4.5 cm in maximum diameter. No mesenteric lymphadenopathy is identified; Bone Scan: No evidence of osseous metastasis.   Continue same fracture. Continue FOLFIRI + Avastin and repeat scans in 3 months. Cycle # 13 FOLFIRI + Avastin was on 01/24/2017. Cycle # 14 FOLFIRI + Avastin was on 02/07/2017. Cycle # 15 FOLFIRI + Avastin was on 02/21/2017. Cycle # 16 FOLFIRI + Avastin was on 03/07/2017. Cycle # 17 FOLFIRI + Avastin was on 03/21/2017. Cycle # 18 FOLFIRI + Avastin was on 04/04/2017. CT chest 04/17/2017 negative for metastatic disease. CT abdomen/pelvis 04/17/2017 Stable hypodense metastatic lesions within the liver. Stable area of increased sclerosis along the right acetabulum  Bone scan 04/17/2017 Stable subtle uptake within the left proximal femoral diaphysis; No definite abnormal uptake in the region of a sclerotic lesion along the posterior column of the right acetabulum noted on CT. Stable slight uptake within the left anterior sixth rib corresponding to linear sclerosis on the recent CT, favored to be related to a fracture. Continue FOLFIRI + Avastin and repeat scans in 3 months. Cycle # 19 FOLFIRI + Avastin was on 04/18/2017. Cycle # 20 FOLFIRI + Avastin was on 05/02/2017. Cycle # 21 FOLFIRI + Avastin was on 05/16/2017. Cycle # 22 FOLFIRI + Avastin was on 05/30/2017. Cycle # 23 FOLFIRI + Avastin was on 06/13/2017. Cycle # 24 FOLFIRI + Avastin was on 06/27/2017. Cycle # 25 FOLFIRI + Avastin was on 07/11/2017. Cycle # 26 FOLFIRI + Avastin was on 07/25/2017. Cycle # 27 FOLFIRI + Avastin was on 08/08/2017. Cycle # 28 FOLFIRI + Avastin was on 08/22/2017. Cycle # 29 FOLFIRI + Avastin was on 09/05/2017. Cycle # 30 FOLFIRI + Avastin was on 09/19/2017. Bone scan 09/26/2017 stable. CT abdomen/pelvis on 09/26/2017 Stable hypodense mass in the liver. Stable sclerotic lesion in the acetabulum. CT chest 09/26/2017 negative for metastatic disease. Cycle # 31 FOLFIRI + Avastin was on 10/03/2017. CEA 7.4 on 10/03/2017. Cycle # 32 FOLFIRI + Avastin was on 10/17/2017. CEA 6.0 on 10/17/2017.   Cycle # 33 FOLFIRI + on 07/03/2018. Cycle # 51 FOLFIRI + Avastin was on 07/24/2018. Cycle # 52 FOLFIRI + Avastin was on 08/14/2018. Cycle # 53 FOLFIRI + Avastin was on 08/28/2018. CT chest 09/06/2018 noted interval decrease in size of LUCY measuring up to 4 mm, suggestive of treatment response. No mediastinal or hilar LN  CT abdomen/pelvis 09/06/2018 Slight interval increase in size of a previously noted metastatic lesion within the right hepatic lobe. This may be related to differences in phase of enhancement compared to the most recent study from 5/31/2018, the lesion remains decreased in size compared to the more previous studies. No abdominal, retroperitoneal, or pelvic lymphadenopathy. Continue FOLFIRI + Avastin and repeat scans in 2 months. Cycle # 54 FOLFIRI + Avastin was on 09/11/2018. Cycle # 55 FOLFIRI + Avastin was on 09/25/2018. Cycle # 56 FOLFIRI + Avastin was on 10/09/2018. Cycle # 57 FOLFIRI + Avastin was on 10/23/2018. CT chest 11/02/2018 stable 3 mm nodule within LUCY. No new right and left lung nodule. No mediastinal or osseous lesion. CT abdomen/pelvis 11/02/2018 stable metastatic disease to liver. No new metastatic disease identified. No mesenteric or retroperitoneal LN. No gross colonic lesion identified. Continue FOLFIRI + Avastin and repeat scans in 3 months. Cycle # 58 FOLFIRI + Avastin was on 11/06/2018. CEA 7.6 on 11/05/2018. Cycle # 59 FOLFIRI + Avastin was on 11/27/2018. CEA 6.9 on 11/26/2018. Cycle # 60 FOLFIRI + Avastin was on 12/11/2018. CEA 6.7 on 12/11/2018. Cycle # 61 FOLFIRI + Avastin was on 01/08/2019. CEA 5.6 on 01/07/2019. Cycle # 62 FOLFIRI + Avastin was on 01/29/2019. CEA 4.6 on 01/28/2019. Cycle # 63 FOLFIRI + Avastin was on 02/12/2019. CEA 4.3 on 02/11/2019. CT chest 02/21/2019 no evidence of metastatic disease. CT abdomen/pelvis 02/21/2019 stable hypodense liver lesions. No evidence of worsening metastatic disease.  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 Ordered MRI thoracic spine and referred to neurosurgery team.    Cycle # 64 FOLFIRI + Avastin was on 02/26/2019. CEA 4.7 on 02/25/2019. Cycle # 65 FOLFIRI + Avastin was on 03/12/2019. CEA 4.0 on 03/11/2019. Cycle # 66 FOLFIRI + Avastin was on 03/26/2019. CEA 4.7 on 03/25/2019. Cycle # 67 FOLFIRI + Avastin was on 04/09/2019. CEA 5.6 on 04/08/2019. Cycle # 68 FOLFIRI + Avastin was on 04/30/2019. CEA 4.9 on 04/29/2019. Cycle # 69 FOLFIRI + Avastin was on 05/14/2019. CEA 5.3 on 05/14/2019. CT chest 05/23/2019 stable small lung nodule with no evidence of metastatic disease. CT abdomen/pelvis 05/23/2019 Decrease conspicuity of the liver lesions. No new lesions seen. Stable splenomegaly. Continue FOLFIRI + Avastin and repeat scans in 3 months. Cycle # 70 FOLFIRI + Avastin was on 06/04/2019. CEA 4.8 on 06/03/2019. Cycle # 71 FOLFIRI + Avastin was on 06/18/2019. CEA 4.6 on 06/17/2019. Cycle # 72 FOLFIRI + Avastin was on 07/09/2019. CEA 4.3 on 07/08/2019. Cycle # 73 FOLFIRI + Avastin was on 07/30/2019. CEA 5.0 on 07/29/2019. Cycle # 74 FOLFIRI + Avastin was on 08/13/2019. CEA 5.0 on 08/12/2019. CT chest 08/20/2019 negative  CT abdomen/pelvis 08/20/2019 Previous identified hepatic lesions are not visualized on the current exam.  Continue FOLFIRI + Avastin and repeat scans in 3 months. Cycle # 75 FOLFIRI + Avastin was on 08/27/2019. CEA 5.0 on 08/26/2019. Cycle # 76 FOLFIRI + Avastin was on 09/17/2019. CEA 5.5 on 09/16/2019. Cycle # 77 FOLFIRI + Avastin was on 10/15/2019. CEA 4.6 on 10/15/2019. Cycle # 78 FOLFIRI + Avastin was on 10/29/2019. CEA 4.1 on 10/28/2019. Cycle # 79 FOLFIRI + Avastin was on 11/12/2019. CEA 4.3 on 11/12/2019. Cycle # 80 FOLFIRI + Avastin was on 12/10/2019. CEA 4.0 on 12/09/2019.   CT chest 12/19/2019 Stable 3 mm nodule within the left upper lobe, similar to the previous studies dating back to 11/2/2018, however decreased in size compared Systems;  CONSTITUTIONAL: No fever, chills. Fair appetite and energy level. ENMT: Eyes: No diplopia; Nose: No epistaxis. Mouth:No lesions  RESPIRATORY: No hemoptysis, shortness of breath. CARDIOVASCULAR: No chest pain, palpitations. GASTROINTESTINAL: No nausea/vomiting, abdominal pain. GENITOURINARY: No dysuria, urinary frequency, hematuria. NEURO: No syncope, presyncope, headache. Remainder ROS: Negative. Past Medical History:   Past Medical History               Diagnosis Date    Arthritis      Cancer ([facility] [address]         colon    Depression      Hyperlipidemia      Hypertension           Medications:  Reviewed and reconciled.     Allergies: Allergies   Allergen Reactions    Neosporin [Neomycin-Polymyxin-Gramicidin] Rash    Tape Darryle Lout Tape] Rash      Physical Exam:  /64 (Site: Left Upper Arm, Position: Sitting, Cuff Size: Large Adult)   Pulse 62   Temp 98 °F (36.7 °C) (Temporal)   Ht 6' (1.829 m)   Wt 186 lb (84.4 kg)   SpO2 100%   BMI 25.23 kg/m²   GENERAL: Alert, oriented x 3, not in acute distress  HEENT: PERRLA, EOMI. No oral lesions. NECK: Supple. Without lymphadenopathy. LUNGS: Lungs are CTA bilaterally, with no wheezing, crackles or rhonchi. CARDIOVASCULAR: Regular rhythm. No murmurs, rubs or gallops. ABDOMEN: Soft. Non-tender, non-distended. Positive bowel sounds. EXTREMITIES: Without clubbing, cyanosis or edema. NEUROLOGIC: No focal deficits.    ECOG PS 1    Diagnostics:  Lab Results   Component Value Date    WBC 1.8 (L) 06/29/2020    HGB 7.7 (L) 06/29/2020    HCT 24.3 (L) 06/29/2020    .3 (H) 06/29/2020    PLT 63 (L) 06/29/2020     Lab Results   Component Value Date     06/29/2020    K 3.7 06/29/2020     06/29/2020    CO2 26 06/29/2020    BUN 11 06/29/2020    CREATININE 1.1 06/29/2020    GLUCOSE 102 (H) 06/29/2020    CALCIUM 9.0 06/29/2020    PROT 6.1 (L) 06/29/2020    LABALBU 3.7 06/29/2020    BILITOT 0.8 06/29/2020    ALKPHOS 167 (H) progression; He derived maximum benefit from FOLFOX/AVASTIN. D/C FOLFOX/AVASTIN. We recommended FOLFIRI second line therapy. Cycle # 1 FOLFIRI was on 08/02/2016. CEA 40.8 on 08/02/2016. Xgeva q4 weeks started on 08/02/2016. Cycle # 2 FOLFIRI was on 08/16/2016. CEA 31.7 on 08/16/2016. Cycle # 3 FOLFIRI (added Avastin) was on 08/30/2016 given that ulcers healed on EGD 08/15/2016 by Dr. Cristina Bernstein; Protonix bid since avastin re-started. Colonoscopy on 08/29/2016 (to look at the cecal area) unremarkable. CEA 26.7 on 08/30/2016. Cycle # 4 FOLFIRI/Avastin was on 09/13/2016. CEA 23.4 on 09/13/2016. Cycle # 5 FOLFIRI/Avastin was on 09/27/2016. CEA 22.3 on 09/27/2016. Cycle # 6 FOLFIRI/Avastin was on 10/11/2016. CEA 18.4 on 10/11/2016.      Bone scan 10/21/2016 stable bone metastasis; ? New lesion anterior left sixth rib likely interval healing fracture. CT abdomen/pelvis revealed stable hepatic metastasis. CT chest negative for metastatic disease. Continue FOLFIRI/Avastin and repeat scans in 3 months. Cycle # 7 FOLFIRI/Avastin was on 10/25/2016. CEA 16.1 on 10/25/2016. Cycle # 8 FOLFIRI/Avastin was on 11/08/2016. CEA 15.7 on 11/08/2016. Cycle # 9 FOLFIRI/Avastin was on 11/29/2016. CEA 13.6 on 11/29/2016. Cycle # 10 FOLFIRI/Avastin was on 12/13/2016. CEA 10.6 on 12/13/2016. Cycle # 11 FOLFIRI/Avastin was on 12/27/2016. CEA 11.1 on 12/27/2016. Cycle # 12 FOLFIRI/Avastin was on 01/10/2017. CEA 9.7 on 01/10/2017. CT chest 01/23/2017 No new pulmonary nodule or lymphadenopathy. Patchy groundglass opacities within the left lower lobe, suggestive of infectious or inflammatory etiology. Followup CT chest in 3 months. Slight increased linear sclerosis along the left anterior sixth rib corresponding to an area of increased uptake on the prior bone scan from 10/21/2016, favored to be related to interval healing changes of a nondisplaced fracture.    CT abdomen/pelvis on 01/23/2017 Redemonstration of multiple hepatic metastases, which are similar compared to the prior CT from 10/21/2016. Redemonstration of area of increased sclerosis along the right acetabulum suspicious for metastatic disease. Bone scan on 01/23/2017 Stable subtle uptake within the left proximal diaphysis, again suggestive of metastatic disease  Decreased subtle uptake within the medial right acetabulum. Decreased uptake within the left anterior sixth rib corresponding to linear sclerosis on the recent CT, favored to be related to an joint rib fracture. Continue FOLFIRI + Avastin and repeat scans in 3 months. Cycle # 13 FOLFIRI + Avastin was on 01/24/2017. Cycle # 14 FOLFIRI + Avastin was on 02/07/2017. Cycle # 15 FOLFIRI + Avastin was on 02/21/2017. Cycle # 16 FOLFIRI + Avastin was on 03/07/2017. Cycle # 17 FOLFIRI + Avastin was on 03/21/2017. Cycle # 18 FOLFIRI + Avastin was on 04/04/2017. CT chest 04/17/2017 negative for metastatic disease. CT abdomen/pelvis 04/17/2017 Stable hypodense metastatic lesions within the liver. Stable area of increased sclerosis along the right acetabulum  Bone scan 04/17/2017 Stable subtle uptake within the left proximal femoral diaphysis; No definite abnormal uptake in the region of a sclerotic lesion along the posterior column of the right acetabulum noted on CT. Stable slight uptake within the left anterior sixth rib corresponding to linear sclerosis on the recent CT, favored to be related to a fracture. Continue FOLFIRI + Avastin and repeat scans in 3 months. Cycle # 19 FOLFIRI + Avastin was on 04/18/2017. CEA 7.5 on 04/18/2017. Cycle # 20 FOLFIRI + Avastin was on 05/02/2017. CEA 7.7 on 05/02/2017. Cycle # 21 FOLFIRI + Avastin was on 05/16/2017. CEA 7.1 on 05/16/2017. Cycle # 22 FOLFIRI + Avastin was on 05/30/2017. CEA 8.2 on 05/30/2017. Cycle # 23 FOLFIRI + Avastin was on 06/13/2017. CEA 7.7 on 06/13/2017. Cycle # 24 FOLFIRI + Avastin was on 06/27/2017.   CEA 6.6 on Shanita Lemon MD  Board Certified Medical Oncologist

## 2020-07-06 ENCOUNTER — HOSPITAL ENCOUNTER (OUTPATIENT)
Age: 70
Discharge: HOME | End: 2020-07-06
Payer: MEDICARE

## 2020-07-06 LAB
ALBUMIN SERPL-MCNC: 3.5 G/DL (ref 3.5–5.2)
ALP BLD-CCNC: 180 U/L (ref 40–129)
ALT SERPL-CCNC: 20 U/L (ref 0–40)
ANION GAP SERPL CALCULATED.3IONS-SCNC: 11 MMOL/L (ref 7–16)
ANISOCYTOSIS: ABNORMAL
AST SERPL-CCNC: 32 U/L (ref 0–39)
BASOPHILS ABSOLUTE: 0 E9/L (ref 0–0.2)
BASOPHILS RELATIVE PERCENT: 0.5 % (ref 0–2)
BILIRUB SERPL-MCNC: 0.7 MG/DL (ref 0–1.2)
BUN BLDV-MCNC: 17 MG/DL (ref 8–23)
CALCIUM SERPL-MCNC: 8.6 MG/DL (ref 8.6–10.2)
CEA: 5.7 NG/ML (ref 0–5.2)
CHLORIDE BLD-SCNC: 107 MMOL/L (ref 98–107)
CO2: 24 MMOL/L (ref 22–29)
CREAT SERPL-MCNC: 1.1 MG/DL (ref 0.7–1.2)
EOSINOPHILS ABSOLUTE: 0.04 E9/L (ref 0.05–0.5)
EOSINOPHILS RELATIVE PERCENT: 0.9 % (ref 0–6)
GFR AFRICAN AMERICAN: >60
GFR NON-AFRICAN AMERICAN: >60 ML/MIN/1.73
GLUCOSE BLD-MCNC: 129 MG/DL (ref 74–99)
HCT VFR BLD CALC: 26.6 % (ref 37–54)
HEMOGLOBIN: 8.3 G/DL (ref 12.5–16.5)
LYMPHOCYTES ABSOLUTE: 0.68 E9/L (ref 1.5–4)
LYMPHOCYTES RELATIVE PERCENT: 17.4 % (ref 20–42)
MCH RBC QN AUTO: 33.2 PG (ref 26–35)
MCHC RBC AUTO-ENTMCNC: 31.2 % (ref 32–34.5)
MCV RBC AUTO: 106.4 FL (ref 80–99.9)
MONOCYTES ABSOLUTE: 0.12 E9/L (ref 0.1–0.95)
MONOCYTES RELATIVE PERCENT: 2.6 % (ref 2–12)
NEUTROPHILS ABSOLUTE: 3.16 E9/L (ref 1.8–7.3)
NEUTROPHILS RELATIVE PERCENT: 79.1 % (ref 43–80)
OVALOCYTES: ABNORMAL
PDW BLD-RTO: 19.2 FL (ref 11.5–15)
PLATELET # BLD: 91 E9/L (ref 130–450)
PLATELET CONFIRMATION: NORMAL
PMV BLD AUTO: 10.9 FL (ref 7–12)
POIKILOCYTES: ABNORMAL
POLYCHROMASIA: ABNORMAL
POTASSIUM SERPL-SCNC: 3.7 MMOL/L (ref 3.5–5)
RBC # BLD: 2.5 E12/L (ref 3.8–5.8)
SODIUM BLD-SCNC: 142 MMOL/L (ref 132–146)
TEAR DROP CELLS: ABNORMAL
TOTAL PROTEIN: 6.3 G/DL (ref 6.4–8.3)
WBC # BLD: 4 E9/L (ref 4.5–11.5)

## 2020-07-06 PROCEDURE — 36415 COLL VENOUS BLD VENIPUNCTURE: CPT

## 2020-07-06 PROCEDURE — 80053 COMPREHEN METABOLIC PANEL: CPT

## 2020-07-06 PROCEDURE — 85025 COMPLETE CBC W/AUTO DIFF WBC: CPT

## 2020-07-06 PROCEDURE — 82378 CARCINOEMBRYONIC ANTIGEN: CPT

## 2020-07-07 ENCOUNTER — OFFICE VISIT (OUTPATIENT)
Dept: ONCOLOGY | Age: 70
End: 2020-07-07
Payer: MEDICARE

## 2020-07-07 ENCOUNTER — HOSPITAL ENCOUNTER (OUTPATIENT)
Dept: INFUSION THERAPY | Age: 70
Discharge: HOME | End: 2020-07-07
Payer: MEDICARE

## 2020-07-07 VITALS
SYSTOLIC BLOOD PRESSURE: 120 MMHG | DIASTOLIC BLOOD PRESSURE: 64 MMHG | HEART RATE: 71 BPM | HEIGHT: 72 IN | TEMPERATURE: 98 F | OXYGEN SATURATION: 99 % | BODY MASS INDEX: 25.56 KG/M2 | WEIGHT: 188.7 LBS

## 2020-07-07 VITALS — SYSTOLIC BLOOD PRESSURE: 139 MMHG | HEART RATE: 58 BPM | RESPIRATION RATE: 16 BRPM | DIASTOLIC BLOOD PRESSURE: 70 MMHG

## 2020-07-07 DIAGNOSIS — C20 RECTAL CANCER METASTASIZED TO LIVER (HCC): Primary | ICD-10-CM

## 2020-07-07 DIAGNOSIS — C78.7 RECTAL CANCER METASTASIZED TO LIVER (HCC): Primary | ICD-10-CM

## 2020-07-07 DIAGNOSIS — C79.51 BONE METASTASIS (HCC): ICD-10-CM

## 2020-07-07 PROCEDURE — 96372 THER/PROPH/DIAG INJ SC/IM: CPT

## 2020-07-07 PROCEDURE — 96413 CHEMO IV INFUSION 1 HR: CPT

## 2020-07-07 PROCEDURE — 96368 THER/DIAG CONCURRENT INF: CPT

## 2020-07-07 PROCEDURE — 2580000003 HC RX 258: Performed by: INTERNAL MEDICINE

## 2020-07-07 PROCEDURE — 6360000002 HC RX W HCPCS

## 2020-07-07 PROCEDURE — 96416 CHEMO PROLONG INFUSE W/PUMP: CPT

## 2020-07-07 PROCEDURE — 96417 CHEMO IV INFUS EACH ADDL SEQ: CPT

## 2020-07-07 PROCEDURE — 96375 TX/PRO/DX INJ NEW DRUG ADDON: CPT

## 2020-07-07 PROCEDURE — 96411 CHEMO IV PUSH ADDL DRUG: CPT

## 2020-07-07 PROCEDURE — 6360000002 HC RX W HCPCS: Performed by: INTERNAL MEDICINE

## 2020-07-07 PROCEDURE — 96415 CHEMO IV INFUSION ADDL HR: CPT

## 2020-07-07 PROCEDURE — 99214 OFFICE O/P EST MOD 30 MIN: CPT | Performed by: INTERNAL MEDICINE

## 2020-07-07 RX ORDER — EPINEPHRINE 1 MG/ML
0.3 INJECTION, SOLUTION, CONCENTRATE INTRAVENOUS PRN
Status: CANCELLED | OUTPATIENT
Start: 2020-07-07

## 2020-07-07 RX ORDER — DIPHENHYDRAMINE HYDROCHLORIDE 50 MG/ML
50 INJECTION INTRAMUSCULAR; INTRAVENOUS ONCE
Status: CANCELLED | OUTPATIENT
Start: 2020-07-07

## 2020-07-07 RX ORDER — SODIUM CHLORIDE 9 MG/ML
250 INJECTION, SOLUTION INTRAVENOUS CONTINUOUS
Status: DISCONTINUED | OUTPATIENT
Start: 2020-07-07 | End: 2020-07-08 | Stop reason: HOSPADM

## 2020-07-07 RX ORDER — DEXAMETHASONE SODIUM PHOSPHATE 10 MG/ML
10 INJECTION, SOLUTION INTRAMUSCULAR; INTRAVENOUS ONCE
Status: CANCELLED | OUTPATIENT
Start: 2020-07-07

## 2020-07-07 RX ORDER — ATROPINE SULFATE 0.4 MG/ML
0.4 AMPUL (ML) INJECTION ONCE
Status: CANCELLED | OUTPATIENT
Start: 2020-07-07

## 2020-07-07 RX ORDER — METHYLPREDNISOLONE SODIUM SUCCINATE 125 MG/2ML
125 INJECTION, POWDER, LYOPHILIZED, FOR SOLUTION INTRAMUSCULAR; INTRAVENOUS ONCE
Status: CANCELLED | OUTPATIENT
Start: 2020-07-07

## 2020-07-07 RX ORDER — FLUOROURACIL 50 MG/ML
850 INJECTION, SOLUTION INTRAVENOUS ONCE
Status: CANCELLED | OUTPATIENT
Start: 2020-07-07

## 2020-07-07 RX ORDER — 0.9 % SODIUM CHLORIDE 0.9 %
10 VIAL (ML) INJECTION ONCE
Status: CANCELLED | OUTPATIENT
Start: 2020-07-07

## 2020-07-07 RX ORDER — HEPARIN SODIUM (PORCINE) LOCK FLUSH IV SOLN 100 UNIT/ML 100 UNIT/ML
SOLUTION INTRAVENOUS
Status: COMPLETED
Start: 2020-07-07 | End: 2020-07-07

## 2020-07-07 RX ORDER — ATROPINE SULFATE 0.4 MG/ML
0.4 AMPUL (ML) INJECTION ONCE
Status: COMPLETED | OUTPATIENT
Start: 2020-07-07 | End: 2020-07-07

## 2020-07-07 RX ORDER — FLUOROURACIL 50 MG/ML
850 INJECTION, SOLUTION INTRAVENOUS ONCE
Status: COMPLETED | OUTPATIENT
Start: 2020-07-07 | End: 2020-07-07

## 2020-07-07 RX ORDER — SODIUM CHLORIDE 9 MG/ML
250 INJECTION, SOLUTION INTRAVENOUS CONTINUOUS
Status: CANCELLED | OUTPATIENT
Start: 2020-07-07

## 2020-07-07 RX ORDER — ATROPINE SULFATE 0.4 MG/ML
AMPUL (ML) INJECTION
Status: COMPLETED
Start: 2020-07-07 | End: 2020-07-07

## 2020-07-07 RX ORDER — HEPARIN SODIUM (PORCINE) LOCK FLUSH IV SOLN 100 UNIT/ML 100 UNIT/ML
500 SOLUTION INTRAVENOUS PRN
Status: CANCELLED | OUTPATIENT
Start: 2020-07-07

## 2020-07-07 RX ORDER — SODIUM CHLORIDE 0.9 % (FLUSH) 0.9 %
10 SYRINGE (ML) INJECTION PRN
Status: DISCONTINUED | OUTPATIENT
Start: 2020-07-07 | End: 2020-07-08 | Stop reason: HOSPADM

## 2020-07-07 RX ORDER — DEXAMETHASONE SODIUM PHOSPHATE 10 MG/ML
10 INJECTION INTRAMUSCULAR; INTRAVENOUS ONCE
Status: COMPLETED | OUTPATIENT
Start: 2020-07-07 | End: 2020-07-07

## 2020-07-07 RX ORDER — PALONOSETRON HYDROCHLORIDE 0.05 MG/ML
0.25 INJECTION, SOLUTION INTRAVENOUS ONCE
Status: CANCELLED | OUTPATIENT
Start: 2020-07-07

## 2020-07-07 RX ORDER — SODIUM CHLORIDE 0.9 % (FLUSH) 0.9 %
10 SYRINGE (ML) INJECTION PRN
Status: CANCELLED | OUTPATIENT
Start: 2020-07-07

## 2020-07-07 RX ORDER — HEPARIN SODIUM (PORCINE) LOCK FLUSH IV SOLN 100 UNIT/ML 100 UNIT/ML
500 SOLUTION INTRAVENOUS PRN
Status: DISCONTINUED | OUTPATIENT
Start: 2020-07-07 | End: 2020-07-08 | Stop reason: HOSPADM

## 2020-07-07 RX ORDER — PALONOSETRON HYDROCHLORIDE 0.05 MG/ML
0.25 INJECTION, SOLUTION INTRAVENOUS ONCE
Status: COMPLETED | OUTPATIENT
Start: 2020-07-07 | End: 2020-07-07

## 2020-07-07 RX ORDER — SODIUM CHLORIDE 9 MG/ML
INJECTION, SOLUTION INTRAVENOUS CONTINUOUS
Status: CANCELLED | OUTPATIENT
Start: 2020-07-07

## 2020-07-07 RX ORDER — DEXAMETHASONE SODIUM PHOSPHATE 10 MG/ML
INJECTION INTRAMUSCULAR; INTRAVENOUS
Status: COMPLETED
Start: 2020-07-07 | End: 2020-07-07

## 2020-07-07 RX ORDER — PALONOSETRON HYDROCHLORIDE 0.05 MG/ML
INJECTION, SOLUTION INTRAVENOUS
Status: COMPLETED
Start: 2020-07-07 | End: 2020-07-07

## 2020-07-07 RX ADMIN — LEUCOVORIN CALCIUM 850 MG: 10 INJECTION INTRAMUSCULAR; INTRAVENOUS at 09:54

## 2020-07-07 RX ADMIN — Medication 10 ML: at 08:40

## 2020-07-07 RX ADMIN — BEVACIZUMAB 400 MG: 400 INJECTION, SOLUTION INTRAVENOUS at 08:57

## 2020-07-07 RX ADMIN — DEXAMETHASONE SODIUM PHOSPHATE: 10 INJECTION INTRAMUSCULAR; INTRAVENOUS at 08:51

## 2020-07-07 RX ADMIN — ATROPINE SULFATE 0.4 MG: 0.4 INJECTION, SOLUTION INTRAMUSCULAR; INTRAVENOUS; SUBCUTANEOUS at 09:55

## 2020-07-07 RX ADMIN — DENOSUMAB 120 MG: 120 INJECTION SUBCUTANEOUS at 12:00

## 2020-07-07 RX ADMIN — HEPARIN SODIUM (PORCINE) LOCK FLUSH IV SOLN 100 UNIT/ML: 100 SOLUTION at 11:51

## 2020-07-07 RX ADMIN — PALONOSETRON: 0.25 INJECTION, SOLUTION INTRAVENOUS at 08:51

## 2020-07-07 RX ADMIN — SODIUM CHLORIDE 250 ML: 9 INJECTION, SOLUTION INTRAVENOUS at 08:50

## 2020-07-07 RX ADMIN — Medication: at 11:51

## 2020-07-07 RX ADMIN — FLUOROURACIL 850 MG: 50 INJECTION, SOLUTION INTRAVENOUS at 11:39

## 2020-07-07 RX ADMIN — Medication 0.4 MG: at 09:55

## 2020-07-07 RX ADMIN — PALONOSETRON HYDROCHLORIDE: 0.05 INJECTION, SOLUTION INTRAVENOUS at 08:51

## 2020-07-07 RX ADMIN — Medication 10 ML: at 11:50

## 2020-07-07 RX ADMIN — SODIUM CHLORIDE 400 MG: 9 INJECTION, SOLUTION INTRAVENOUS at 09:55

## 2020-07-07 NOTE — PROGRESS NOTES
Per Cesar Florez from [facility], [phone], we have received patient's pump orders and the nurse's will be contacting him to bring his  pump today. Voiced understanding.

## 2020-07-07 NOTE — PROGRESS NOTES
detected. 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     CEA 3488. Bone scan on 11/10/2015 noted no metastatic disease. CT chest on 11/10/2015 revealed Multiple pulmonary nodules in the upper and lower lobes consistent with metastatic disease;   Hepatic metastasis also visualized. 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 Mediport was placed. Cycle # 1 of FOLFOX + Avastin was on 11/23/2015. CEA was 4555 on 11/23/2015. Cycle # 2 of FOLFOX + Avastin was on 12/08/2015. CEA was 3160 on 12/08/2015. Cycle # 3 of FOLFOX + Avastin was on 12/22/2015. CEA was 2516 on 12/21/2015. Cycle # 4 of FOLFOX + Avastin was on 01/05/2016. CEA was 2098 on 01/05/2015. Cycle # 5 of FOLFOX + Avastin was on 01/19/2016. CEA was 1511 on 01/05/2015.     -Bone scan on 01/26/2016 noted no metastatic disease. CT chest on 01/26/2016 revealed Significant response to treatment with no visible residual nodules. CT scan abdomen/pelvis on 01/26/2016 noted Interval decreased size of multiple masses in the liver compatible with treatment response. Continue another 2 months of FOLFOX + Avastin and repeat scans. Cycle # 6 of FOLFOX + Avastin was on 02/02/2016.  on 02/02/2016. Cycle # 7 of FOLFOX + Avastin was on 02/16/2016.  on 02/16/2016. Cycle # 8 of FOLFOX + Avastin was on 03/01/2016.  on 03/01/2016. Cycle # 9 of FOLFOX + Avastin was on 03/15/2016.  on 03/15/2016. Cycle # 10 of FOLFOX + Avastin was on 03/29/2016. .4 on 03/29/2016. Cycle # 11 of FOLFOX + Avastin was on 04/12/2016. .4 on 04/12/2016.     Re-staging scans on 04/19/2016: CT Chest: clear lungs; no evidence of recurring pulmonary nodule; CT Abdomen/Pelvis: Further interval decrease in size of the multiple metastatic hepatic lesions, the largest lesion now measures 3.9 x 3.5 cm and previously measured 4.5 cm in maximum diameter.  No mesenteric lymphadenopathy is identified; Bone Scan: No evidence of osseous metastasis. Continue same regimen and re-stage in 2-3 months. Cycle # 12 FOLFOX + Avastin was on 04/26/2016. .5 on 04/26/2016. Cycle # 13 FOLFOX + Avastin was on 05/10/2016. .3 on 05/10/2016. Cycle # 14 FOLFOX+AVASTIN was on 05/24/2016. .5 on 05/24/2016. Cycle # 15 FOLFOX + Avastin was on 06/07/2016. CEA 90.5 on 06/07/2016. Admitted to [facility] 06/13/2016-06/16/2016 for abdominal pain: EGD noted 1.5 cm clean based duodenal bulb ulceration s/p epinephrine and bicap per Dr. Demetrius Alaniz. No active bleeding. A. Stomach, biopsy: Mild chronic gastritis, immunostain negative for Helicobacter  B. Esophagus, biopsy: Gastric glandular mucosa with prominent intestinal metaplasia (Mad[place]'s epithelium), negative for epithelial dysplasia, esophageal squamous mucosa not identified  Cycle # 16 FOLFOX (discontinued avastin (bevacizumab) given association of peptic ulcer disease and known association of GI perforation) was on 06/21/2016. Cycle # 17 FOLFOX was on 07/05/2016. CEA 52.6 on 07/19/2016. Cycle # 17 FOLFOX was on 07/05/2016. CEA 52.6 on 07/05/2016. CEA 47.6 on 07/19/2016.     Re-staging scans 07/19/2106: CT Chest negative for metastatic disease. CT Abdomen/Pelvis: Stable hepatic lesions; Question new mural thickening in the cecum. Bone Scan: New Let hip lesion suspicious for bone metastasis.     Increased CEA; new mural thickening in the cecum and new left hip lesion suspicious for bone metastasis are consistent with disease progression; He derived maximum benefit from FOLFOX/AVASTIN. D/C FOLFOX/AVASTIN. We recommended FOLFIRI second line therapy. Cycle # 1 FOLFIRI was on 08/02/2016. CEA 40.8 on 08/02/2016. Xgeva q4 weeks started on 08/02/2016. Cycle # 2 FOLFIRI was on 08/16/2016. CEA 31.7 on 08/16/2016. Cycle # 3 FOLFIRI (added Avastin) was on 08/30/2016 given that ulcers healed on EGD 08/15/2016 by Dr. Stephen Morse; Protonix bid since avastin re-started.    Colonoscopy on 08/29/2016 (to look at the cecal area) unremarkable. CEA 26.7 on 08/30/2016. Cycle # 4 FOLFIRI/Avastin was on 09/13/2016. CEA 23.4 on 09/13/2016. Cycle # 5 FOLFIRI/Avastin was on 09/27/2016. CEA 22.3 on 09/27/2016. Cycle # 6 FOLFIRI/Avastin was on 10/11/2016. CEA 18.4 on 10/11/2016.      Bone scan 10/21/2016 stable bone metastasis; ? New lesion anterior left sixth rib likely interval healing fracture. CT abdomen/pelvis revealed stable hepatic metastasis. CT chest negative for metastatic disease. Continue FOLFIRI/Avastin and repeat scans in 3 months. Cycle # 7 FOLFIRI/Avastin was on 10/25/2016. CEA 16.1  Cycle # 8 FOLFIRI/Avastin was on 11/08/2016. CEA 15.7  Cycle # 9 FOLFIRI/Avastin was on 11/29/2016. CEA 13.6 on 11/29/2016. Cycle # 10 FOLFIRI/Avastin was on 12/13/2016. CEA 10.6 on 12/13/2016. Cycle # 11 FOLFIRI/Avastin was on 12/27/2016. CEA 11.1 on 12/27/2016. Cycle # 12 FOLFIRI/Avastin was on 01/10/2017. CEA 9.7 on 01/10/2017.       CT chest 01/23/2017 No new pulmonary nodule or lymphadenopathy. Patchy groundglass opacities within the left lower lobe, suggestive of infectious or inflammatory etiology. Followup CT chest in 3 months. Slight increased linear sclerosis along the left anterior sixth rib corresponding to an area of increased uptake on the prior bone scan from 10/21/2016, favored to be related to interval healing changes of a nondisplaced fracture. CT abdomen/pelvis on 01/23/2017 Redemonstration of multiple hepatic metastases, which are similar compared to the prior CT from 10/21/2016. Redemonstration of area of increased sclerosis along the right acetabulum suspicious for metastatic disease. Bone scan on 01/23/2017 Stable subtle uptake within the left proximal diaphysis, again suggestive of metastatic disease  Decreased subtle uptake within the medial right acetabulum.    Decreased uptake within the left anterior sixth rib corresponding to linear sclerosis on the recent CT, favored to be related to an joint rib fracture. Continue FOLFIRI + Avastin and repeat scans in 3 months. Cycle # 13 FOLFIRI + Avastin was on 01/24/2017. Cycle # 14 FOLFIRI + Avastin was on 02/07/2017. Cycle # 15 FOLFIRI + Avastin was on 02/21/2017. Cycle # 16 FOLFIRI + Avastin was on 03/07/2017. Cycle # 17 FOLFIRI + Avastin was on 03/21/2017. Cycle # 18 FOLFIRI + Avastin was on 04/04/2017. CT chest 04/17/2017 negative for metastatic disease. CT abdomen/pelvis 04/17/2017 Stable hypodense metastatic lesions within the liver. Stable area of increased sclerosis along the right acetabulum  Bone scan 04/17/2017 Stable subtle uptake within the left proximal femoral diaphysis; No definite abnormal uptake in the region of a sclerotic lesion along the posterior column of the right acetabulum noted on CT. Stable slight uptake within the left anterior sixth rib corresponding to linear sclerosis on the recent CT, favored to be related to a fracture. Continue FOLFIRI + Avastin and repeat scans in 3 months. Cycle # 19 FOLFIRI + Avastin was on 04/18/2017. Cycle # 20 FOLFIRI + Avastin was on 05/02/2017. Cycle # 21 FOLFIRI + Avastin was on 05/16/2017. Cycle # 22 FOLFIRI + Avastin was on 05/30/2017. Cycle # 23 FOLFIRI + Avastin was on 06/13/2017. Cycle # 24 FOLFIRI + Avastin was on 06/27/2017. Cycle # 25 FOLFIRI + Avastin was on 07/11/2017. Cycle # 26 FOLFIRI + Avastin was on 07/25/2017. Cycle # 27 FOLFIRI + Avastin was on 08/08/2017. Cycle # 28 FOLFIRI + Avastin was on 08/22/2017. Cycle # 29 FOLFIRI + Avastin was on 09/05/2017. Cycle # 30 FOLFIRI + Avastin was on 09/19/2017. Bone scan 09/26/2017 stable. CT abdomen/pelvis on 09/26/2017 Stable hypodense mass in the liver. Stable sclerotic lesion in the acetabulum. CT chest 09/26/2017 negative for metastatic disease. Cycle # 31 FOLFIRI + Avastin was on 10/03/2017. CEA 7.4 on 10/03/2017. Cycle # 32 FOLFIRI + Avastin was on 10/17/2017.  CEA 6.0 on 10/17/2017. Cycle # 33 FOLFIRI + Avastin was on 10/31/2017. CEA 6.8 on 10/31/2017. Cycle # 34 FOLFIRI + Avastin was on 11/14/2017. CEA 7.2 on 11/14/2017. Cycle # 35 FOLFIRI + Avastin was on 11/28/2017. CEA 5.1 on 11/28/2017. Cycle # 36 FOLFIRI + Avastin was on 12/12/2017. CEA 6.1 on 12/12/2017. Bone scan 12/22/2017 noted no evidence of metastatic disease to the axial or appendicular skeleton. CT chest 12/22/2017 noted no evidence of metastatic disease. CT abdomen/pelvis 12/22/2017 noted stable hypodense lesions in the liver. Continue FOLFIRI + Avastin and repeat scans in 3 months. Cycle # 37 FOLFIRI + Avastin was on 12/27/2018. Cycle # 38 FOLFIRI + Avastin was on 01/09/2018. Cycle # 39 FOLFIRI + Avastin was on 01/23/2018. Cycle # 40 FOLFIRI + Avastin was on 02/06/2018. Cycle # 41 FOLFIRI + Avastin was on 02/20/2018. Cycle # 42 FOLFIRI + Avastin was on 03/06/2018. Cycle # 43 FOLFIRI + Avastin was on 03/20/2018. Bone scan on 03/26/2018 negative for metastatic disease. CT chest 03/26/2018 noted ? new 7 mm nodule in LUCY. CT abdomen/pelvis 03/26/2018 noted stable hypodense lesions in the liver. ? New small ascites in the abdomen. Patient wants to continue to monitor the lung finding and repeat scans in 2 months instead of changing the current regimen into oral Lonsurf. Cycle # 44 FOLFIRI + Avastin was on 04/03/2018. CEA 6.3 on 04/03/2018. Cycle # 45 FOLFIRI + Avastin was on 04/24/2018. CEA 5.3 on 04/24/2018. Cycle # 46 FOLFIRI + Avastin was on 05/08/2018. CEA 5.4 on 05/08/2018. Cycle # 47 FOLFIRI + Avastin was on 05/22/2018. CEA 6.1 on 05/22/2018. CT chest 05/31/2018 noted stable nodule in LUCY. No evidence of worsening malignancy. CT abdomen/pelvis on 05/31/2018 noted stable liver metastasis. No evidence of worsening malignancy. Continue FOLFIRI + Avastin and repeat scans in 3 months. Cycle # 48 FOLFIRI + Avastin was on 06/06/2018. Cycle # 49 FOLFIRI + Avastin was on 06/19/2018. Review of Systems;  CONSTITUTIONAL: No fever, chills. Fair appetite and energy level. ENMT: Eyes: No diplopia; Nose: No epistaxis. Mouth:No lesions  RESPIRATORY: No hemoptysis, shortness of breath. CARDIOVASCULAR: No chest pain, palpitations. GASTROINTESTINAL: No nausea/vomiting, abdominal pain. GENITOURINARY: No dysuria, urinary frequency, hematuria. NEURO: No syncope, presyncope, headache. Remainder ROS: Negative. Past Medical History:   Past Medical History               Diagnosis Date    Arthritis      Cancer ([facility] [address]         colon    Depression      Hyperlipidemia      Hypertension           Medications:  Reviewed and reconciled.     Allergies: Allergies   Allergen Reactions    Neosporin [Neomycin-Polymyxin-Gramicidin] Rash    Tape Virginie Bamberger Tape] Rash      Physical Exam:  /64 (Site: Left Upper Arm, Position: Sitting, Cuff Size: Large Adult)   Pulse 71   Temp 98 °F (36.7 °C) (Temporal)   Ht 6' (1.829 m)   Wt 188 lb 11.2 oz (85.6 kg)   SpO2 99%   BMI 25.59 kg/m²   GENERAL: Alert, oriented x 3, not in acute distress  HEENT: PERRLA, EOMI. No oral lesions. NECK: Supple. Without lymphadenopathy. LUNGS: Lungs are CTA bilaterally, with no wheezing, crackles or rhonchi. CARDIOVASCULAR: Regular rhythm. No murmurs, rubs or gallops. ABDOMEN: Soft. Non-tender, non-distended. Positive bowel sounds. EXTREMITIES: Without clubbing, cyanosis or edema. NEUROLOGIC: No focal deficits.    ECOG PS 1    Diagnostics:  Lab Results   Component Value Date    WBC 4.0 (L) 07/06/2020    HGB 8.3 (L) 07/06/2020    HCT 26.6 (L) 07/06/2020    .4 (H) 07/06/2020    PLT 91 (L) 07/06/2020     Lab Results   Component Value Date     07/06/2020    K 3.7 07/06/2020     07/06/2020    CO2 24 07/06/2020    BUN 17 07/06/2020    CREATININE 1.1 07/06/2020    GLUCOSE 129 (H) 07/06/2020    CALCIUM 8.6 07/06/2020    PROT 6.3 (L) 07/06/2020    LABALBU 3.5 07/06/2020    BILITOT 0.7 07/06/2020 ALKPHOS 180 (H) 07/06/2020    AST 32 07/06/2020    ALT 20 07/06/2020    LABGLOM >60 07/06/2020    GFRAA >60 07/06/2020     Lab Results   Component Value Date    CEA 5.7 (H) 07/06/2020      Impression/Plan:  68 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 KRAS Mutation: Mutation detected. BRAF Mutation: Mutation not detected. 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 CT scan abdomen/pelvis on 10/26/2015 revealed small nodules at the lung bases, extensive liver lesions consistent with metastatic rectosigmoid cancer. CEA 3488 on 10/30/2015. Bone scan on 11/10/2015 noted no metastatic disease. CT chest on 11/10/2015 revealed Multiple pulmonary nodules in the upper and lower lobes consistent with metastatic disease; Hepatic metastasis also visualized. 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 Mediport was placed. Cycle # 1 of FOLFOX + Avastin was on 11/23/2015. CEA was 4555 on 11/23/2015. Cycle # 2 of FOLFOX + Avastin was on 12/08/2015. CEA was 3160 on 12/08/2015. Cycle # 3 of FOLFOX + Avastin was on 12/22/2015. CEA was 2516 on 12/21/2015. Cycle # 4 of FOLFOX + Avastin was on 01/05/2016. CEA was 2098 on 01/05/2015. Cycle # 5 of FOLFOX + Avastin was on 01/19/2016. CEA was 1511 on 01/05/2015.     -Bone scan on 01/26/2016 noted no metastatic disease. CT chest on 01/26/2016 revealed Significant response to treatment with no visible residual nodules. CT scan abdomen/pelvis on 01/26/2016 noted Interval decreased size of multiple masses in the liver compatible with treatment response. Continue another 2 months of FOLFOX + Avastin and repeat scans. Cycle # 6 of FOLFOX + Avastin was on 02/02/2016.  on 02/02/2016. Cycle # 7 of FOLFOX + Avastin was on 02/16/2016.  on 02/16/2016. Cycle # 8 of FOLFOX + Avastin was on 03/01/2016.  on 03/01/2016. Cycle # 9 of FOLFOX + Avastin was on 03/15/2016.   on 03/15/2016. Cycle # 10 of FOLFOX + Avastin was on 03/29/2016. .4 on 03/29/2016. Cycle # 11 of FOLFOX + Avastin was on 04/12/2016. .4 on 04/12/2016.     Re-staging scans on 04/19/2016: CT Chest: clear lungs; no evidence of recurring pulmonary nodule; CT Abdomen/Pelvis: Further interval decrease in size of the multiple metastatic hepatic lesions, the largest lesion now measures 3.9 x 3.5 cm and previously measured 4.5 cm in maximum diameter. No mesenteric lymphadenopathy is identified; Bone Scan: No evidence of osseous metastasis. Continue same regimen and re-stage in 2-3 months. Cycle # 12 FOLFOX + Avastin was on 04/26/2016. .5 on 04/26/2016. Cycle # 13 FOLFOX + Avastin was on 05/10/2016. .3 on 05/10/2016. Cycle # 14 FOLFOX+AVASTIN was on 05/24/2016. .5 on 05/24/2016. Cycle # 15 FOLFOX + Avastin was on 06/07/2016. CEA 90.5 on 06/07/2016. Admitted to [facility] 06/13/2016-06/16/2016 for abdominal pain: EGD noted 1.5 cm clean based duodenal bulb ulceration s/p epinephrine and bicap per Dr. Nelly Allen. No active bleeding. A. Stomach, biopsy: Mild chronic gastritis, immunostain negative for Helicobacter  B. Esophagus, biopsy: Gastric glandular mucosa with prominent ntestinal metaplasia (Mad[place]'s epithelium), negative for epithelial dysplasia, esophageal squamous mucosa not identified     Cycle # 16 FOLFOX (discontinued avastin (bevacizumab) given association of peptic ulcer disease and known association of GI perforation.) was on 06/21/2016. CEA 47 on 06/21/2016. Cycle # 17 FOLFOX was on 07/05/2016. CEA 52.6 on 07/05/2016. CEA 47.6 on 07/19/2016.     Re-staging scans 07/19/2106: CT Chest negative for metastatic disease. CT Abdomen/Pelvis: Stable hepatic lesions; Question new mural thickening in the cecum. Bone Scan: New Let hip lesion suspicious for bone metastasis.        Increased CEA; new mural thickening in the cecum and new left hip lesion suspicious for bone metastasis are Redemonstration of multiple hepatic metastases, which are similar compared to the prior CT from 10/21/2016. Redemonstration of area of increased sclerosis along the right acetabulum suspicious for metastatic disease. Bone scan on 01/23/2017 Stable subtle uptake within the left proximal diaphysis, again suggestive of metastatic disease  Decreased subtle uptake within the medial right acetabulum. Decreased uptake within the left anterior sixth rib corresponding to linear sclerosis on the recent CT, favored to be related to an joint rib fracture. Continue FOLFIRI + Avastin and repeat scans in 3 months. Cycle # 13 FOLFIRI + Avastin was on 01/24/2017. Cycle # 14 FOLFIRI + Avastin was on 02/07/2017. Cycle # 15 FOLFIRI + Avastin was on 02/21/2017. Cycle # 16 FOLFIRI + Avastin was on 03/07/2017. Cycle # 17 FOLFIRI + Avastin was on 03/21/2017. Cycle # 18 FOLFIRI + Avastin was on 04/04/2017. CT chest 04/17/2017 negative for metastatic disease. CT abdomen/pelvis 04/17/2017 Stable hypodense metastatic lesions within the liver. Stable area of increased sclerosis along the right acetabulum  Bone scan 04/17/2017 Stable subtle uptake within the left proximal femoral diaphysis; No definite abnormal uptake in the region of a sclerotic lesion along the posterior column of the right acetabulum noted on CT. Stable slight uptake within the left anterior sixth rib corresponding to linear sclerosis on the recent CT, favored to be related to a fracture. Continue FOLFIRI + Avastin and repeat scans in 3 months. Cycle # 19 FOLFIRI + Avastin was on 04/18/2017. CEA 7.5 on 04/18/2017. Cycle # 20 FOLFIRI + Avastin was on 05/02/2017. CEA 7.7 on 05/02/2017. Cycle # 21 FOLFIRI + Avastin was on 05/16/2017. CEA 7.1 on 05/16/2017. Cycle # 22 FOLFIRI + Avastin was on 05/30/2017. CEA 8.2 on 05/30/2017. Cycle # 23 FOLFIRI + Avastin was on 06/13/2017. CEA 7.7 on 06/13/2017. Cycle # 24 FOLFIRI + Avastin was on 06/27/2017. CEA 6.6 on 06/27/2017. Re-staging scans 07/05/2017:  Bone scan stable proximal left femoral diaphysis, right acetabulum, and left anterior sixth rib lesions;  CT abdomen/pelvis stable hypodense metastatic lesions within the liver; CT chest without convincing evidence of metastatic disease. Cycle # 25 FOLFIRI + Avastin was on 07/11/2017. CEA 6.3 on 07/11/2017. Cycle # 26 FOLFIRI + Avastin was on 07/25/2017. CEA 7.3 on 07/25/2017. Cycle # 27 FOLFIRI + Avastin was on 08/08/2017. CEA 6.5 on 08/08/2017. Cycle # 28 FOLFIRI + Avastin was on 08/22/2017. CEA 5.9 on 08/22/2017. Cycle # 29 FOLFIRI + Avastin was on 09/05/2017. CEA 6.3 on 09/05/2017. Cycle # 30 FOLFIRI + Avastin was on 09/19/2017. CEA 6.3 on 09/19/2017. Bone scan 09/26/2017 stable. CT abdomen/pelvis on 09/26/2017 Stable hypodense mass in the liver. Stable sclerotic lesion in the acetabulum. CT chest 09/26/2017 negative for metastatic disease. Cycle # 31 FOLFIRI + Avastin was on 10/03/2017. CEA 7.4 on 10/03/2017. Cycle # 32 FOLFIRI + Avastin was on 10/17/2017. CEA 6.0 on 10/17/2017. Cycle # 33 FOLFIRI + Avastin was on 10/31/2017. CEA 6.8 on 10/31/2017. Cycle # 34 FOLFIRI + Avastin was on 11/14/2017. CEA 7.2 on 11/14/2017. Cycle # 35 FOLFIRI + Avastin was on 11/28/2017. CEA 5.1 on 11/28/2017. Cycle # 36 FOLFIRI + Avastin was on 12/12/2017. CEA 6.1 on 12/12/2017. Bone scan 12/22/2017 noted no evidence of metastatic disease to the axial or appendicular skeleton. CT chest 12/22/2017 noted no evidence of metastatic disease. CT abdomen/pelvis 12/22/2017 noted stable hypodense lesions in the liver. Continue FOLFIRI + Avastin and repeat scans in 3 months. Cycle # 37 FOLFIRI + Avastin was on 12/27/2018. CEA 6.7 on 12/27/2017. Cycle # 38 FOLFIRI + Avastin was on 01/09/2018. CEA 5.0 on 01/09/2018. Cycle # 39 FOLFIRI + Avastin was on 01/23/2018. CEA 6.5 on 01/23/2018. Cycle # 40 FOLFIRI + Avastin was on 02/06/2018.  CEA 6.9 on 02/06/2018. Cycle # 41 FOLFIRI + Avastin was on 02/20/2018. CEA 6.4 on 02/20/2018. Cycle # 42 FOLFIRI + Avastin was on 03/06/2018. CEA 6.6 on 03/06/2018. Cycle # 43 FOLFIRI + Avastin was on 03/20/2018. CEA 5.7 on 03/20/2018. Bone scan on 03/26/2018 negative for metastatic disease. CT chest 03/26/2018 noted ? new 7 mm nodule in LUCY. CT abdomen/pelvis 03/26/2018 noted stable hypodense lesions in the liver. ? New small ascites in the abdomen. Patient wants to continue to monitor the lung finding and repeat scans in 2 months instead of changing the current chemotherapy regimen to oral Lonsurf. Cycle # 44 FOLFIRI + Avastin was on 04/03/2018. CEA 6.3 on 04/03/2018. Cycle # 45 FOLFIRI + Avastin was on 04/24/2018. CEA 5.3 on 04/24/2018. Cycle # 46 FOLFIRI + Avastin was on 05/08/2018. CEA 5.4 on 05/08/2018. Cycle # 47 FOLFIRI + Avastin was on 05/22/2018. CEA 6.1 on 05/22/2018. CT chest 05/31/2018 noted stable nodule in LUCY. No evidence of worsening malignancy. CT abdomen/pelvis on 05/31/2018 noted stable liver metastasis. No evidence of worsening malignancy. Continue FOLFIRI + Avastin and repeat scans in 3 months. Cycle # 48 FOLFIRI + Avastin was on 06/06/2018. CEA 6.3 on 06/05/2018. Cycle # 49 FOLFIRI + Avastin was on 06/19/2018. CEA 6.1 on 06/19/2018. Cycle # 50 FOLFIRI + Avastin was on 07/03/2018. CEA 7.4 on 07/03/2018. Cycle # 51 FOLFIRI + Avastin was on 07/24/2018. CEA 6.7 on 07/24/2018. Cycle # 52 FOLFIRI + Avastin was on 08/14/2018. CEA 6.8 on 08/14/2018. Cycle # 53 FOLFIRI + Avastin was on 08/28/2018. CEA 6.5 on 08/27/2018. CT chest 09/06/2018 noted interval decrease in size of LUCY measuring up to 4 mm, suggestive of treatment response. No mediastinal or hilar LN  CT abdomen/pelvis 09/06/2018 Slight interval increase in size of a previously noted metastatic lesion within the right hepatic lobe.  This may be related to differences in phase of enhancement compared to the most recent study 04/29/2019. Cycle # 69 FOLFIRI + Avastin was on 05/14/2019. CEA 5.3 on 05/14/2019. CT chest 05/23/2019 stable small lung nodule with no evidence of metastatic disease. CT abdomen/pelvis 05/23/2019 Decrease conspicuity of the liver lesions. No new lesions seen. Stable splenomegaly. Continue FOLFIRI + Avastin and repeat scans in 3 months. Cycle # 70 FOLFIRI + Avastin was on 06/04/2019. CEA 4.8 on 06/03/2019. Cycle # 71 FOLFIRI + Avastin was on 06/18/2019. CEA 4.6 on 06/17/2019. Cycle # 72 FOLFIRI + Avastin was on 07/09/2019. CEA 4.3 on 07/08/2019. Cycle # 73 FOLFIRI + Avastin was on 07/30/2019. CEA 5.0 on 07/29/2019. Cycle # 74 FOLFIRI + Avastin was on 08/13/2019. CEA 5.0 on 08/12/2019. CT chest 08/20/2019 negative  CT abdomen/pelvis 08/20/2019 Previous identified hepatic lesions are not visualized on the current exam.  Continue FOLFIRI + Avastin and repeat scans in 3 months. Cycle # 75 FOLFIRI + Avastin was on 08/27/2019. CEA 5.0 on 08/26/2019. Cycle # 76 FOLFIRI + Avastin was on 09/17/2019. CEA 5.5 on 09/16/2019. Cycle # 77 FOLFIRI + Avastin was on 10/15/2019. CEA 4.6 on 10/15/2019. Cycle # 78 FOLFIRI + Avastin was on 10/29/2019. CEA 4.1 on 10/28/2019. Cycle # 79 FOLFIRI + Avastin was on 11/12/2019. CEA 4.3 on 11/12/2019. Cycle # 80 FOLFIRI + Avastin was on 12/10/2019. CEA 4.0 on 12/09/2019. CT chest 12/19/2019 Stable 3 mm nodule within the left upper lobe, similar to the previous studies dating back to 11/2/2018, however decreased in size compared to the CT from 3/26/2018. No thoracic LN. CT abdomen/pelvis 12/19/2019 Previously described small hypodense lesions are more conspicuous on the current study compared to the recent study throughout the liver from 8/20/2019, however similar to the prior study from 2/21/2019. Findings may be related to differences in contrast opacification. No abdominal or pelvic lymphadenopathy.    Continue FOLFIRI + Avastin and repeat scans in 2 months to f/u on liver lesions. Cycle # 81 FOLFIRI + Avastin was on 01/07/2020. CEA 3.8 on 01/06/2020. Cycle # 82 FOLFIRI + Avastin was on 01/21/2020. CEA 3.7 on 01/20/2020. Cycle # 83 FOLFIRI + Avastin was on 02/04/2020. CEA 4.1 on 02/03/2020. Cycle # 84 FOLFIRI + Avastin was on 02/18/2020. CEA 4.6 on 02/17/2020. CT chest 2/28/2020 no evidence of metastatic disease to the lungs and no mediastinal or hilar adenopathy. CT abdomen pelvis 2/28/2020 no enhancing lesions seen within the liver to suggest metastatic disease. Wall thickening of the rectosigmoid junction. Images reviewed. Continue FOLFIRI Avastin and repeat scans in 3 months  EGD by Dr. Rylan Godfrey 03/02/2020 showing no masses or lesions. Cycle # 85 FOLFIRI + Avastin was on 03/03/2020. CEA 7.4 on 03/02/2020. Cycle # 86 FOLFIRI + Avastin was on 03/17/2020. CEA 4.5 on 03/16/2020. Colonoscopy on 03/23/2020 by Dr. Rylan Godfrey unremarkable (records requested). Cycle # 87 FOLFIRI + Avastin was on 03/31/2020. CEA 4.1 on 03/30/2020. Cycle # 88 FOLFIRI + Avastin was on 04/21/2020. CEA 6.4 on 04/20/2020. Cycle # 89 FOLFIRI + Avastin was on 05/05/2020. CEA 5.8 on 05/04/2020. Cycle # 90 FOLFIRI + Avastin was on 06/02/2020. CEA 5.5 on 06/01/2020. Cycle # 91 FOLFIRI + Avastin was on 06/16/2020. CEA 5.6 on 06/15/2020. CT chest 06/23/2020 noted no metastatic disease in the chest.   CT abdomen/pelvis 06/23/2020 Stable small liver lesions measuring up to 5 mm since 2019.   22 mm round mass in segment 8 of the liver with central high density stable from December 2019), previously measuring up to 34 mm in 2017. No additional metastatic disease in the abdomen or pelvis. Small amount of ascites. Overall stable disease. Continue FOLFIRI + Avastin and repeat scans in 2-3 months. Cycle # 92 FOLFIRI + Avastin today 07/07/2020. CEA 5.7 on 07/06/2020. RTC 2 weeks for Cycle # 93 FOLFIRI + Avastin.   MSI testing noted no mismatch repair protein loss of expression    7/7/2020  Jocelyn Carmona MD  Board Certified Medical Oncologist

## 2020-07-09 ENCOUNTER — HOSPITAL ENCOUNTER (OUTPATIENT)
Dept: INFUSION THERAPY | Age: 70
Discharge: HOME | End: 2020-07-09
Payer: MEDICARE

## 2020-07-09 DIAGNOSIS — C78.7 RECTAL CANCER METASTASIZED TO LIVER (HCC): ICD-10-CM

## 2020-07-09 DIAGNOSIS — C20 RECTAL CANCER METASTASIZED TO LIVER (HCC): ICD-10-CM

## 2020-07-09 DIAGNOSIS — Z51.11 ENCOUNTER FOR ANTINEOPLASTIC CHEMOTHERAPY: Primary | ICD-10-CM

## 2020-07-09 PROCEDURE — 96372 THER/PROPH/DIAG INJ SC/IM: CPT

## 2020-07-09 PROCEDURE — 6360000002 HC RX W HCPCS: Performed by: INTERNAL MEDICINE

## 2020-07-09 RX ADMIN — PEGFILGRASTIM 6 MG: 6 INJECTION SUBCUTANEOUS at 12:53

## 2020-07-20 ENCOUNTER — HOSPITAL ENCOUNTER (OUTPATIENT)
Age: 70
Discharge: HOME | End: 2020-07-20
Payer: MEDICARE

## 2020-07-20 LAB
ALBUMIN SERPL-MCNC: 3.3 G/DL (ref 3.5–5.2)
ALP BLD-CCNC: 163 U/L (ref 40–129)
ALT SERPL-CCNC: 19 U/L (ref 0–40)
ANION GAP SERPL CALCULATED.3IONS-SCNC: 13 MMOL/L (ref 7–16)
ANISOCYTOSIS: ABNORMAL
AST SERPL-CCNC: 25 U/L (ref 0–39)
BASOPHILS ABSOLUTE: 0 E9/L (ref 0–0.2)
BASOPHILS RELATIVE PERCENT: 0.9 % (ref 0–2)
BILIRUB SERPL-MCNC: 1 MG/DL (ref 0–1.2)
BUN BLDV-MCNC: 15 MG/DL (ref 8–23)
BURR CELLS: ABNORMAL
CALCIUM SERPL-MCNC: 8.8 MG/DL (ref 8.6–10.2)
CEA: 5.9 NG/ML (ref 0–5.2)
CHLORIDE BLD-SCNC: 106 MMOL/L (ref 98–107)
CO2: 25 MMOL/L (ref 22–29)
CREAT SERPL-MCNC: 1 MG/DL (ref 0.7–1.2)
DOHLE BODIES: ABNORMAL
EOSINOPHILS ABSOLUTE: 0.04 E9/L (ref 0.05–0.5)
EOSINOPHILS RELATIVE PERCENT: 1.8 % (ref 0–6)
GFR AFRICAN AMERICAN: >60
GFR NON-AFRICAN AMERICAN: >60 ML/MIN/1.73
GLUCOSE BLD-MCNC: 128 MG/DL (ref 74–99)
HCT VFR BLD CALC: 25.4 % (ref 37–54)
HEMOGLOBIN: 7.8 G/DL (ref 12.5–16.5)
HYPOCHROMIA: ABNORMAL
LYMPHOCYTES ABSOLUTE: 0.41 E9/L (ref 1.5–4)
LYMPHOCYTES RELATIVE PERCENT: 18.4 % (ref 20–42)
MCH RBC QN AUTO: 32.8 PG (ref 26–35)
MCHC RBC AUTO-ENTMCNC: 30.7 % (ref 32–34.5)
MCV RBC AUTO: 106.7 FL (ref 80–99.9)
MONOCYTES ABSOLUTE: 0.05 E9/L (ref 0.1–0.95)
MONOCYTES RELATIVE PERCENT: 1.8 % (ref 2–12)
NEUTROPHILS ABSOLUTE: 1.79 E9/L (ref 1.8–7.3)
NEUTROPHILS RELATIVE PERCENT: 78.1 % (ref 43–80)
OVALOCYTES: ABNORMAL
PDW BLD-RTO: 18.7 FL (ref 11.5–15)
PLATELET # BLD: 76 E9/L (ref 130–450)
PLATELET CONFIRMATION: NORMAL
PMV BLD AUTO: 11.4 FL (ref 7–12)
POIKILOCYTES: ABNORMAL
POLYCHROMASIA: ABNORMAL
POTASSIUM SERPL-SCNC: 3.7 MMOL/L (ref 3.5–5)
RBC # BLD: 2.38 E12/L (ref 3.8–5.8)
SCHISTOCYTES: ABNORMAL
SODIUM BLD-SCNC: 144 MMOL/L (ref 132–146)
TEAR DROP CELLS: ABNORMAL
TOTAL PROTEIN: 5.9 G/DL (ref 6.4–8.3)
WBC # BLD: 2.3 E9/L (ref 4.5–11.5)

## 2020-07-20 PROCEDURE — 36415 COLL VENOUS BLD VENIPUNCTURE: CPT

## 2020-07-20 PROCEDURE — 85025 COMPLETE CBC W/AUTO DIFF WBC: CPT

## 2020-07-20 PROCEDURE — 80053 COMPREHEN METABOLIC PANEL: CPT

## 2020-07-20 PROCEDURE — 82378 CARCINOEMBRYONIC ANTIGEN: CPT

## 2020-07-21 ENCOUNTER — HOSPITAL ENCOUNTER (OUTPATIENT)
Dept: INFUSION THERAPY | Age: 70
Discharge: HOME | End: 2020-07-21
Payer: MEDICARE

## 2020-07-21 ENCOUNTER — OFFICE VISIT (OUTPATIENT)
Dept: ONCOLOGY | Age: 70
End: 2020-07-21
Payer: MEDICARE

## 2020-07-21 VITALS
BODY MASS INDEX: 25.9 KG/M2 | OXYGEN SATURATION: 99 % | DIASTOLIC BLOOD PRESSURE: 64 MMHG | WEIGHT: 195.4 LBS | HEIGHT: 73 IN | SYSTOLIC BLOOD PRESSURE: 117 MMHG | TEMPERATURE: 97.4 F | HEART RATE: 61 BPM

## 2020-07-21 VITALS — SYSTOLIC BLOOD PRESSURE: 118 MMHG | HEART RATE: 57 BPM | DIASTOLIC BLOOD PRESSURE: 72 MMHG

## 2020-07-21 DIAGNOSIS — C20 RECTAL CANCER METASTASIZED TO LIVER (HCC): Primary | ICD-10-CM

## 2020-07-21 DIAGNOSIS — C78.7 RECTAL CANCER METASTASIZED TO LIVER (HCC): Primary | ICD-10-CM

## 2020-07-21 PROCEDURE — 2580000003 HC RX 258: Performed by: INTERNAL MEDICINE

## 2020-07-21 PROCEDURE — 96417 CHEMO IV INFUS EACH ADDL SEQ: CPT

## 2020-07-21 PROCEDURE — 96368 THER/DIAG CONCURRENT INF: CPT

## 2020-07-21 PROCEDURE — 96375 TX/PRO/DX INJ NEW DRUG ADDON: CPT

## 2020-07-21 PROCEDURE — 96415 CHEMO IV INFUSION ADDL HR: CPT

## 2020-07-21 PROCEDURE — 96413 CHEMO IV INFUSION 1 HR: CPT

## 2020-07-21 PROCEDURE — 96411 CHEMO IV PUSH ADDL DRUG: CPT

## 2020-07-21 PROCEDURE — 6360000002 HC RX W HCPCS: Performed by: INTERNAL MEDICINE

## 2020-07-21 PROCEDURE — 99214 OFFICE O/P EST MOD 30 MIN: CPT | Performed by: INTERNAL MEDICINE

## 2020-07-21 RX ORDER — SODIUM CHLORIDE 0.9 % (FLUSH) 0.9 %
10 SYRINGE (ML) INJECTION PRN
Status: CANCELLED | OUTPATIENT
Start: 2020-07-21

## 2020-07-21 RX ORDER — PALONOSETRON HYDROCHLORIDE 0.05 MG/ML
0.25 INJECTION, SOLUTION INTRAVENOUS ONCE
Status: CANCELLED | OUTPATIENT
Start: 2020-07-21

## 2020-07-21 RX ORDER — ATROPINE SULFATE 0.4 MG/ML
0.4 AMPUL (ML) INJECTION ONCE
Status: COMPLETED | OUTPATIENT
Start: 2020-07-21 | End: 2020-07-21

## 2020-07-21 RX ORDER — SODIUM CHLORIDE 0.9 % (FLUSH) 0.9 %
10 SYRINGE (ML) INJECTION PRN
Status: DISCONTINUED | OUTPATIENT
Start: 2020-07-21 | End: 2020-07-22 | Stop reason: HOSPADM

## 2020-07-21 RX ORDER — FLUOROURACIL 50 MG/ML
850 INJECTION, SOLUTION INTRAVENOUS ONCE
Status: COMPLETED | OUTPATIENT
Start: 2020-07-21 | End: 2020-07-21

## 2020-07-21 RX ORDER — ATROPINE SULFATE 0.4 MG/ML
0.4 AMPUL (ML) INJECTION ONCE
Status: CANCELLED | OUTPATIENT
Start: 2020-07-21

## 2020-07-21 RX ORDER — SODIUM CHLORIDE 9 MG/ML
250 INJECTION, SOLUTION INTRAVENOUS CONTINUOUS
Status: CANCELLED | OUTPATIENT
Start: 2020-07-21

## 2020-07-21 RX ORDER — METHYLPREDNISOLONE SODIUM SUCCINATE 125 MG/2ML
125 INJECTION, POWDER, LYOPHILIZED, FOR SOLUTION INTRAMUSCULAR; INTRAVENOUS ONCE
Status: CANCELLED | OUTPATIENT
Start: 2020-07-21

## 2020-07-21 RX ORDER — HEPARIN SODIUM (PORCINE) LOCK FLUSH IV SOLN 100 UNIT/ML 100 UNIT/ML
500 SOLUTION INTRAVENOUS PRN
Status: DISCONTINUED | OUTPATIENT
Start: 2020-07-21 | End: 2020-07-22 | Stop reason: HOSPADM

## 2020-07-21 RX ORDER — DEXAMETHASONE SODIUM PHOSPHATE 10 MG/ML
10 INJECTION, SOLUTION INTRAMUSCULAR; INTRAVENOUS ONCE
Status: CANCELLED | OUTPATIENT
Start: 2020-07-21

## 2020-07-21 RX ORDER — DEXAMETHASONE SODIUM PHOSPHATE 10 MG/ML
10 INJECTION INTRAMUSCULAR; INTRAVENOUS ONCE
Status: COMPLETED | OUTPATIENT
Start: 2020-07-21 | End: 2020-07-21

## 2020-07-21 RX ORDER — SODIUM CHLORIDE 9 MG/ML
250 INJECTION, SOLUTION INTRAVENOUS CONTINUOUS
Status: DISCONTINUED | OUTPATIENT
Start: 2020-07-21 | End: 2020-07-22 | Stop reason: HOSPADM

## 2020-07-21 RX ORDER — HEPARIN SODIUM (PORCINE) LOCK FLUSH IV SOLN 100 UNIT/ML 100 UNIT/ML
500 SOLUTION INTRAVENOUS PRN
Status: CANCELLED | OUTPATIENT
Start: 2020-07-21

## 2020-07-21 RX ORDER — EPINEPHRINE 1 MG/ML
0.3 INJECTION, SOLUTION, CONCENTRATE INTRAVENOUS PRN
Status: CANCELLED | OUTPATIENT
Start: 2020-07-21

## 2020-07-21 RX ORDER — PALONOSETRON HYDROCHLORIDE 0.05 MG/ML
0.25 INJECTION, SOLUTION INTRAVENOUS ONCE
Status: COMPLETED | OUTPATIENT
Start: 2020-07-21 | End: 2020-07-21

## 2020-07-21 RX ORDER — DIPHENHYDRAMINE HYDROCHLORIDE 50 MG/ML
50 INJECTION INTRAMUSCULAR; INTRAVENOUS ONCE
Status: CANCELLED | OUTPATIENT
Start: 2020-07-21

## 2020-07-21 RX ORDER — FLUOROURACIL 50 MG/ML
850 INJECTION, SOLUTION INTRAVENOUS ONCE
Status: CANCELLED | OUTPATIENT
Start: 2020-07-21

## 2020-07-21 RX ORDER — 0.9 % SODIUM CHLORIDE 0.9 %
10 VIAL (ML) INJECTION ONCE
Status: CANCELLED | OUTPATIENT
Start: 2020-07-21

## 2020-07-21 RX ORDER — SODIUM CHLORIDE 9 MG/ML
INJECTION, SOLUTION INTRAVENOUS CONTINUOUS
Status: CANCELLED | OUTPATIENT
Start: 2020-07-21

## 2020-07-21 RX ADMIN — BEVACIZUMAB 400 MG: 400 INJECTION, SOLUTION INTRAVENOUS at 09:11

## 2020-07-21 RX ADMIN — LEUCOVORIN CALCIUM 850 MG: 350 INJECTION, POWDER, LYOPHILIZED, FOR SOLUTION INTRAMUSCULAR; INTRAVENOUS at 09:48

## 2020-07-21 RX ADMIN — Medication 500 UNITS: at 11:30

## 2020-07-21 RX ADMIN — PALONOSETRON 0.25 MG: 0.25 INJECTION, SOLUTION INTRAVENOUS at 08:40

## 2020-07-21 RX ADMIN — ATROPINE SULFATE 0.4 MG: 0.4 INJECTION, SOLUTION INTRAMUSCULAR; INTRAVENOUS; SUBCUTANEOUS at 09:06

## 2020-07-21 RX ADMIN — SODIUM CHLORIDE, PRESERVATIVE FREE 10 ML: 5 INJECTION INTRAVENOUS at 11:30

## 2020-07-21 RX ADMIN — SODIUM CHLORIDE 250 ML: 9 INJECTION, SOLUTION INTRAVENOUS at 08:37

## 2020-07-21 RX ADMIN — DEXAMETHASONE SODIUM PHOSPHATE 10 MG: 10 INJECTION INTRAMUSCULAR; INTRAVENOUS at 08:43

## 2020-07-21 RX ADMIN — SODIUM CHLORIDE 400 MG: 9 INJECTION, SOLUTION INTRAVENOUS at 09:48

## 2020-07-21 RX ADMIN — FLUOROURACIL 850 MG: 50 INJECTION, SOLUTION INTRAVENOUS at 11:24

## 2020-07-21 NOTE — PROGRESS NOTES
Chemotherapy pump orders faxed to [facility] @ [phone] and call placed to [phone] and spoke with kevin to confirm receipt. The pump will be administered in the home and de accessed in 46 hrs. Encouraged to call with questions/concerns.

## 2020-07-21 NOTE — PROGRESS NOTES
[place] [id number]  Attending Clinic Note     Reason for Visit: Follow-up on a patient with Metastatic Rectal Cancer.     PCP: Valery Paredes MD     History of Present Illness:  68 y/o  male who was referred to see Dr. Shaina Jaramillo (GI team) for evaluation of bright red blood per rectum, mild anemia and change in bowel habits with diarrhea. CEA 2640 on 10/19/2015. AlcP 299 AST 57 ALT 75 on 10/19/2015. Colonoscopy in 2013 noted no significant polyps, colitis or lesions at that time. Denies any Family History of colorectal cancer or polyps.     Colonoscopy on 10/19/2015 revealed:  1. Ascending polyp, 8 mm, hot snare: Tubulovillous adenoma. 2. Transverse polyp, 1 cm, hot snare: Serrated polyp most consistent with sessile serrated adenoma. 3. Five splenic flexure polyps, three - 5 mm, 7 mm, 8 mm, hot snare and biopsy: Four segments of Tubular Adenoma  4. Descending polyp, 5 mm, biopsy: Tubular adenoma. 5. Sigmoid polyp, 4 mm biopsy, Serrated polyp most consistent with sessile serrated adenoma. 6. Two rectal polyps, 4 mm biopsy: Serrated polyp most consistent with hyperplastic polyp. 7. Rectosigmoid colon mass (large mass approximately 65% circumference of the lumen; Very friable, firm and hard): Tubulovillous adenoma with associated focal erosion and fibroplasia.      CT scan abdomen/pelvis on 10/26/2015:  1. Small nodules at lung bases likely represent metastatic colon   cancer. 2. Extensive likely metastatic colon cancer throughout the liver.      3. Mild mural thickening and luminal narrowing in the   terminal ileum, otherwise nonspecific.      Colonoscopy with snare removal rectal mass was performed by Dr. Gail Willett. Pathology proved:  Rectal polyp: Invasive adenocarcinoma involving villous adenoma and extending to the cauterized edge of excision. KRAS Mutation: Mutation detected.   BRAF Mutation: Mutation not detected. 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     CEA 3488. Bone scan on 11/10/2015 noted no metastatic disease. CT chest on 11/10/2015 revealed Multiple pulmonary nodules in the upper and lower lobes consistent with metastatic disease;   Hepatic metastasis also visualized. 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 Mediport was placed. Cycle # 1 of FOLFOX + Avastin was on 11/23/2015. CEA was 4555 on 11/23/2015. Cycle # 2 of FOLFOX + Avastin was on 12/08/2015. CEA was 3160 on 12/08/2015. Cycle # 3 of FOLFOX + Avastin was on 12/22/2015. CEA was 2516 on 12/21/2015. Cycle # 4 of FOLFOX + Avastin was on 01/05/2016. CEA was 2098 on 01/05/2015. Cycle # 5 of FOLFOX + Avastin was on 01/19/2016. CEA was 1511 on 01/05/2015.     -Bone scan on 01/26/2016 noted no metastatic disease. CT chest on 01/26/2016 revealed Significant response to treatment with no visible residual nodules. CT scan abdomen/pelvis on 01/26/2016 noted Interval decreased size of multiple masses in the liver compatible with treatment response. Continue another 2 months of FOLFOX + Avastin and repeat scans. Cycle # 6 of FOLFOX + Avastin was on 02/02/2016.  on 02/02/2016. Cycle # 7 of FOLFOX + Avastin was on 02/16/2016.  on 02/16/2016. Cycle # 8 of FOLFOX + Avastin was on 03/01/2016.  on 03/01/2016. Cycle # 9 of FOLFOX + Avastin was on 03/15/2016.  on 03/15/2016. Cycle # 10 of FOLFOX + Avastin was on 03/29/2016. .4 on 03/29/2016. Cycle # 11 of FOLFOX + Avastin was on 04/12/2016. .4 on 04/12/2016.     Re-staging scans on 04/19/2016: CT Chest: clear lungs; no evidence of recurring pulmonary nodule; CT Abdomen/Pelvis: Further interval decrease in size of the multiple metastatic hepatic lesions, the largest lesion now measures 3.9 x 3.5 cm and previously measured 4.5 cm in maximum diameter.  No mesenteric lymphadenopathy is identified; Bone Scan: No evidence of osseous metastasis. Continue same regimen and re-stage in 2-3 months. Cycle # 12 FOLFOX + Avastin was on 04/26/2016. .5 on 04/26/2016. Cycle # 13 FOLFOX + Avastin was on 05/10/2016. .3 on 05/10/2016. Cycle # 14 FOLFOX+AVASTIN was on 05/24/2016. .5 on 05/24/2016. Cycle # 15 FOLFOX + Avastin was on 06/07/2016. CEA 90.5 on 06/07/2016. Admitted to [facility] 06/13/2016-06/16/2016 for abdominal pain: EGD noted 1.5 cm clean based duodenal bulb ulceration s/p epinephrine and bicap per Dr. Sparkle Tracy. No active bleeding. A. Stomach, biopsy: Mild chronic gastritis, immunostain negative for Helicobacter  B. Esophagus, biopsy: Gastric glandular mucosa with prominent intestinal metaplasia (Mad[place]'s epithelium), negative for epithelial dysplasia, esophageal squamous mucosa not identified  Cycle # 16 FOLFOX (discontinued avastin (bevacizumab) given association of peptic ulcer disease and known association of GI perforation) was on 06/21/2016. Cycle # 17 FOLFOX was on 07/05/2016. CEA 52.6 on 07/19/2016. Cycle # 17 FOLFOX was on 07/05/2016. CEA 52.6 on 07/05/2016. CEA 47.6 on 07/19/2016.     Re-staging scans 07/19/2106: CT Chest negative for metastatic disease. CT Abdomen/Pelvis: Stable hepatic lesions; Question new mural thickening in the cecum. Bone Scan: New Let hip lesion suspicious for bone metastasis.     Increased CEA; new mural thickening in the cecum and new left hip lesion suspicious for bone metastasis are consistent with disease progression; He derived maximum benefit from FOLFOX/AVASTIN. D/C FOLFOX/AVASTIN. We recommended FOLFIRI second line therapy. Cycle # 1 FOLFIRI was on 08/02/2016. CEA 40.8 on 08/02/2016. Xgeva q4 weeks started on 08/02/2016. Cycle # 2 FOLFIRI was on 08/16/2016. CEA 31.7 on 08/16/2016. Cycle # 3 FOLFIRI (added Avastin) was on 08/30/2016 given that ulcers healed on EGD 08/15/2016 by Dr. Mitchell Porter; Protonix bid since avastin re-started.    Colonoscopy on 08/29/2016 (to look at the cecal area) unremarkable. CEA 26.7 on 08/30/2016. Cycle # 4 FOLFIRI/Avastin was on 09/13/2016. CEA 23.4 on 09/13/2016. Cycle # 5 FOLFIRI/Avastin was on 09/27/2016. CEA 22.3 on 09/27/2016. Cycle # 6 FOLFIRI/Avastin was on 10/11/2016. CEA 18.4 on 10/11/2016.      Bone scan 10/21/2016 stable bone metastasis; ? New lesion anterior left sixth rib likely interval healing fracture. CT abdomen/pelvis revealed stable hepatic metastasis. CT chest negative for metastatic disease. Continue FOLFIRI/Avastin and repeat scans in 3 months. Cycle # 7 FOLFIRI/Avastin was on 10/25/2016. CEA 16.1  Cycle # 8 FOLFIRI/Avastin was on 11/08/2016. CEA 15.7  Cycle # 9 FOLFIRI/Avastin was on 11/29/2016. CEA 13.6 on 11/29/2016. Cycle # 10 FOLFIRI/Avastin was on 12/13/2016. CEA 10.6 on 12/13/2016. Cycle # 11 FOLFIRI/Avastin was on 12/27/2016. CEA 11.1 on 12/27/2016. Cycle # 12 FOLFIRI/Avastin was on 01/10/2017. CEA 9.7 on 01/10/2017.       CT chest 01/23/2017 No new pulmonary nodule or lymphadenopathy. Patchy groundglass opacities within the left lower lobe, suggestive of infectious or inflammatory etiology. Followup CT chest in 3 months. Slight increased linear sclerosis along the left anterior sixth rib corresponding to an area of increased uptake on the prior bone scan from 10/21/2016, favored to be related to interval healing changes of a nondisplaced fracture. CT abdomen/pelvis on 01/23/2017 Redemonstration of multiple hepatic metastases, which are similar compared to the prior CT from 10/21/2016. Redemonstration of area of increased sclerosis along the right acetabulum suspicious for metastatic disease. Bone scan on 01/23/2017 Stable subtle uptake within the left proximal diaphysis, again suggestive of metastatic disease  Decreased subtle uptake within the medial right acetabulum.    Decreased uptake within the left anterior sixth rib corresponding to linear sclerosis on the recent CT, favored to be related to an joint rib fracture. Continue FOLFIRI + Avastin and repeat scans in 3 months. Cycle # 13 FOLFIRI + Avastin was on 01/24/2017. Cycle # 14 FOLFIRI + Avastin was on 02/07/2017. Cycle # 15 FOLFIRI + Avastin was on 02/21/2017. Cycle # 16 FOLFIRI + Avastin was on 03/07/2017. Cycle # 17 FOLFIRI + Avastin was on 03/21/2017. Cycle # 18 FOLFIRI + Avastin was on 04/04/2017. CT chest 04/17/2017 negative for metastatic disease. CT abdomen/pelvis 04/17/2017 Stable hypodense metastatic lesions within the liver. Stable area of increased sclerosis along the right acetabulum  Bone scan 04/17/2017 Stable subtle uptake within the left proximal femoral diaphysis; No definite abnormal uptake in the region of a sclerotic lesion along the posterior column of the right acetabulum noted on CT. Stable slight uptake within the left anterior sixth rib corresponding to linear sclerosis on the recent CT, favored to be related to a fracture. Continue FOLFIRI + Avastin and repeat scans in 3 months. Cycle # 19 FOLFIRI + Avastin was on 04/18/2017. Cycle # 20 FOLFIRI + Avastin was on 05/02/2017. Cycle # 21 FOLFIRI + Avastin was on 05/16/2017. Cycle # 22 FOLFIRI + Avastin was on 05/30/2017. Cycle # 23 FOLFIRI + Avastin was on 06/13/2017. Cycle # 24 FOLFIRI + Avastin was on 06/27/2017. Cycle # 25 FOLFIRI + Avastin was on 07/11/2017. Cycle # 26 FOLFIRI + Avastin was on 07/25/2017. Cycle # 27 FOLFIRI + Avastin was on 08/08/2017. Cycle # 28 FOLFIRI + Avastin was on 08/22/2017. Cycle # 29 FOLFIRI + Avastin was on 09/05/2017. Cycle # 30 FOLFIRI + Avastin was on 09/19/2017. Bone scan 09/26/2017 stable. CT abdomen/pelvis on 09/26/2017 Stable hypodense mass in the liver. Stable sclerotic lesion in the acetabulum. CT chest 09/26/2017 negative for metastatic disease. Cycle # 31 FOLFIRI + Avastin was on 10/03/2017. CEA 7.4 on 10/03/2017. Cycle # 32 FOLFIRI + Avastin was on 10/17/2017.  CEA 6.0 on 10/17/2017. Cycle # 33 FOLFIRI + Avastin was on 10/31/2017. CEA 6.8 on 10/31/2017. Cycle # 34 FOLFIRI + Avastin was on 11/14/2017. CEA 7.2 on 11/14/2017. Cycle # 35 FOLFIRI + Avastin was on 11/28/2017. CEA 5.1 on 11/28/2017. Cycle # 36 FOLFIRI + Avastin was on 12/12/2017. CEA 6.1 on 12/12/2017. Bone scan 12/22/2017 noted no evidence of metastatic disease to the axial or appendicular skeleton. CT chest 12/22/2017 noted no evidence of metastatic disease. CT abdomen/pelvis 12/22/2017 noted stable hypodense lesions in the liver. Continue FOLFIRI + Avastin and repeat scans in 3 months. Cycle # 37 FOLFIRI + Avastin was on 12/27/2018. Cycle # 38 FOLFIRI + Avastin was on 01/09/2018. Cycle # 39 FOLFIRI + Avastin was on 01/23/2018. Cycle # 40 FOLFIRI + Avastin was on 02/06/2018. Cycle # 41 FOLFIRI + Avastin was on 02/20/2018. Cycle # 42 FOLFIRI + Avastin was on 03/06/2018. Cycle # 43 FOLFIRI + Avastin was on 03/20/2018. Bone scan on 03/26/2018 negative for metastatic disease. CT chest 03/26/2018 noted ? new 7 mm nodule in LUCY. CT abdomen/pelvis 03/26/2018 noted stable hypodense lesions in the liver. ? New small ascites in the abdomen. Patient wants to continue to monitor the lung finding and repeat scans in 2 months instead of changing the current regimen into oral Lonsurf. Cycle # 44 FOLFIRI + Avastin was on 04/03/2018. CEA 6.3 on 04/03/2018. Cycle # 45 FOLFIRI + Avastin was on 04/24/2018. CEA 5.3 on 04/24/2018. Cycle # 46 FOLFIRI + Avastin was on 05/08/2018. CEA 5.4 on 05/08/2018. Cycle # 47 FOLFIRI + Avastin was on 05/22/2018. CEA 6.1 on 05/22/2018. CT chest 05/31/2018 noted stable nodule in LUCY. No evidence of worsening malignancy. CT abdomen/pelvis on 05/31/2018 noted stable liver metastasis. No evidence of worsening malignancy. Continue FOLFIRI + Avastin and repeat scans in 3 months. Cycle # 48 FOLFIRI + Avastin was on 06/06/2018. Cycle # 49 FOLFIRI + Avastin was on 06/19/2018. Cycle # 50 FOLFIRI + Avastin was on 07/03/2018. Cycle # 51 FOLFIRI + Avastin was on 07/24/2018. Cycle # 52 FOLFIRI + Avastin was on 08/14/2018. Cycle # 53 FOLFIRI + Avastin was on 08/28/2018. CT chest 09/06/2018 noted interval decrease in size of LUCY measuring up to 4 mm, suggestive of treatment response. No mediastinal or hilar LN  CT abdomen/pelvis 09/06/2018 Slight interval increase in size of a previously noted metastatic lesion within the right hepatic lobe. This may be related to differences in phase of enhancement compared to the most recent study from 5/31/2018, the lesion remains decreased in size compared to the more previous studies. No abdominal, retroperitoneal, or pelvic lymphadenopathy. Continue FOLFIRI + Avastin and repeat scans in 2 months. Cycle # 54 FOLFIRI + Avastin was on 09/11/2018. Cycle # 55 FOLFIRI + Avastin was on 09/25/2018. Cycle # 56 FOLFIRI + Avastin was on 10/09/2018. Cycle # 57 FOLFIRI + Avastin was on 10/23/2018. CT chest 11/02/2018 stable 3 mm nodule within LUCY. No new right and left lung nodule. No mediastinal or osseous lesion. CT abdomen/pelvis 11/02/2018 stable metastatic disease to liver. No new metastatic disease identified. No mesenteric or retroperitoneal LN. No gross colonic lesion identified. Continue FOLFIRI + Avastin and repeat scans in 3 months. Cycle # 58 FOLFIRI + Avastin was on 11/06/2018. CEA 7.6 on 11/05/2018. Cycle # 59 FOLFIRI + Avastin was on 11/27/2018. CEA 6.9 on 11/26/2018. Cycle # 60 FOLFIRI + Avastin was on 12/11/2018. CEA 6.7 on 12/11/2018. Cycle # 61 FOLFIRI + Avastin was on 01/08/2019. CEA 5.6 on 01/07/2019. Cycle # 62 FOLFIRI + Avastin was on 01/29/2019. CEA 4.6 on 01/28/2019. Cycle # 63 FOLFIRI + Avastin was on 02/12/2019. CEA 4.3 on 02/11/2019. CT chest 02/21/2019 no evidence of metastatic disease. CT abdomen/pelvis 02/21/2019 stable hypodense liver lesions. No evidence of worsening metastatic disease.  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 Ordered MRI thoracic spine and referred to neurosurgery team.    Cycle # 64 FOLFIRI + Avastin was on 02/26/2019. CEA 4.7 on 02/25/2019. Cycle # 65 FOLFIRI + Avastin was on 03/12/2019. CEA 4.0 on 03/11/2019. Cycle # 66 FOLFIRI + Avastin was on 03/26/2019. CEA 4.7 on 03/25/2019. Cycle # 67 FOLFIRI + Avastin was on 04/09/2019. CEA 5.6 on 04/08/2019. Cycle # 68 FOLFIRI + Avastin was on 04/30/2019. CEA 4.9 on 04/29/2019. Cycle # 69 FOLFIRI + Avastin was on 05/14/2019. CEA 5.3 on 05/14/2019. CT chest 05/23/2019 stable small lung nodule with no evidence of metastatic disease. CT abdomen/pelvis 05/23/2019 Decrease conspicuity of the liver lesions. No new lesions seen. Stable splenomegaly. Continue FOLFIRI + Avastin and repeat scans in 3 months. Cycle # 70 FOLFIRI + Avastin was on 06/04/2019. CEA 4.8 on 06/03/2019. Cycle # 71 FOLFIRI + Avastin was on 06/18/2019. CEA 4.6 on 06/17/2019. Cycle # 72 FOLFIRI + Avastin was on 07/09/2019. CEA 4.3 on 07/08/2019. Cycle # 73 FOLFIRI + Avastin was on 07/30/2019. CEA 5.0 on 07/29/2019. Cycle # 74 FOLFIRI + Avastin was on 08/13/2019. CEA 5.0 on 08/12/2019. CT chest 08/20/2019 negative  CT abdomen/pelvis 08/20/2019 Previous identified hepatic lesions are not visualized on the current exam.  Continue FOLFIRI + Avastin and repeat scans in 3 months. Cycle # 75 FOLFIRI + Avastin was on 08/27/2019. CEA 5.0 on 08/26/2019. Cycle # 76 FOLFIRI + Avastin was on 09/17/2019. CEA 5.5 on 09/16/2019. Cycle # 77 FOLFIRI + Avastin was on 10/15/2019. CEA 4.6 on 10/15/2019. Cycle # 78 FOLFIRI + Avastin was on 10/29/2019. CEA 4.1 on 10/28/2019. Cycle # 79 FOLFIRI + Avastin was on 11/12/2019. CEA 4.3 on 11/12/2019. Cycle # 80 FOLFIRI + Avastin was on 12/10/2019. CEA 4.0 on 12/09/2019.   CT chest 12/19/2019 Stable 3 mm nodule within the left upper lobe, similar to the previous studies dating back to 11/2/2018, however decreased in size compared to the CT from 3/26/2018. No thoracic LN. CT abdomen/pelvis 12/19/2019 Previously described small hypodense lesions are more conspicuous on the current study compared to the recent study throughout the liver from 8/20/2019, however similar to the prior study from 2/21/2019. Findings may be related to differences in contrast opacification. No abdominal or pelvic lymphadenopathy. Continue FOLFIRI + Avastin and repeat scans in 2 months to f/u on liver lesions. Cycle # 81 FOLFIRI + Avastin was on 01/07/2020. CEA 3.8 on 01/06/2020. Cycle # 82 FOLFIRI + Avastin was on 01/21/2020. CEA 3.7 on 01/20/2020. Cycle # 83 FOLFIRI + Avastin was on 02/04/2020. CEA 4.1 on 02/03/2020. Cycle # 84 FOLFIRI + Avastin was on 02/18/2020. CEA 4.6 on 02/17/2020. EGD by Dr. Demar Leonard 03/02/2020 showing no masses or lesions  Cycle # 85 FOLFIRI + Avastin was on 03/03/2020. CEA 7.4 on 03/02/2020. Cycle # 86 FOLFIRI + Avastin was on 03/17/2020. CEA 4.5 on 03/16/2020. Colonoscopy on 03/23/2020 by Dr. Demar Leonard unremarkable (records requested). Cycle # 87 FOLFIRI + Avastin was on 03/31/2020. CEA 4.1 on 03/30/2020. Cycle # 88 FOLFIRI + Avastin was on 04/21/2020. CEA 6.4 on 04/20/2020. Cycle # 89 FOLFIRI + Avastin was on 05/05/2020. CEA 5.8 on 05/04/2020. Cycle # 90 FOLFIRI + Avastin was on 06/02/2020. CEA 5.5 on 06/01/2020. Cycle # 91 FOLFIRI + Avastin was on 06/16/2020. CEA 5.6 on 06/15/2020. CT chest 06/23/2020 noted no metastatic disease in the chest.   CT abdomen/pelvis 06/23/2020 Stable small liver lesions measuring up to 5 mm since 2019.   22 mm round mass in segment 8 of the liver with central high density stable from December 2019), previously measuring up to 34 mm in 2017. No additional metastatic disease in the abdomen or pelvis. Small amount of ascites. Overall stable disease. Continue FOLFIRI + Avastin and repeat scans in 2-3 months. Cycle # 92 FOLFIRI + Avastin was on 07/07/2020.  CEA 5.7 on 07/06/2020. Cycle # 93 FOLFIRI + Avastin is today 07/21/2020. CEA 5.9 on 07/20/2020. He presented today 07/20/2020 Cycle # 93 FOLFIRI + Avastin. No fever, chills. No chest pain or SOB. No nausea or vomiting. No diarrhea. Review of Systems;  CONSTITUTIONAL: No fever, chills. Fair appetite and energy level. ENMT: Eyes: No diplopia; Nose: No epistaxis. Mouth:No lesions  RESPIRATORY: No hemoptysis, shortness of breath. CARDIOVASCULAR: No chest pain, palpitations. GASTROINTESTINAL: No nausea/vomiting, abdominal pain. GENITOURINARY: No dysuria, urinary frequency, hematuria. NEURO: No syncope, presyncope, headache. Remainder ROS: Negative. Past Medical History:   Past Medical History               Diagnosis Date    Arthritis      Cancer ([facility] [address]         colon    Depression      Hyperlipidemia      Hypertension           Medications:  Reviewed and reconciled.     Allergies: Allergies   Allergen Reactions    Neosporin [Neomycin-Polymyxin-Gramicidin] Rash    Tape Cassy Bongo Tape] Rash      Physical Exam:  /64 (Site: Right Upper Arm, Position: Sitting, Cuff Size: Medium Adult)   Pulse 61   Temp 97.4 °F (36.3 °C) (Temporal)   Ht 6' 1\" (1.854 m)   Wt 195 lb 6.4 oz (88.6 kg)   SpO2 99%   BMI 25.78 kg/m²   GENERAL: Alert, oriented x 3, not in acute distress  HEENT: PERRLA, EOMI. No oral lesions. NECK: Supple. Without lymphadenopathy. LUNGS: Lungs are CTA bilaterally, with no wheezing, crackles or rhonchi. CARDIOVASCULAR: Regular rhythm. No murmurs, rubs or gallops. ABDOMEN: Soft. Non-tender, non-distended. Positive bowel sounds. EXTREMITIES: Without clubbing, cyanosis or edema. NEUROLOGIC: No focal deficits.    ECOG PS 1    Diagnostics:  Lab Results   Component Value Date    WBC 2.3 (L) 07/20/2020    HGB 7.8 (L) 07/20/2020    HCT 25.4 (L) 07/20/2020    .7 (H) 07/20/2020    PLT 76 (L) 07/20/2020     Lab Results   Component Value Date     07/20/2020    K 3.7 07/20/2020     07/20/2020    CO2 25 07/20/2020    BUN 15 07/20/2020    CREATININE 1.0 07/20/2020    GLUCOSE 128 (H) 07/20/2020    CALCIUM 8.8 07/20/2020    PROT 5.9 (L) 07/20/2020    LABALBU 3.3 (L) 07/20/2020    BILITOT 1.0 07/20/2020    ALKPHOS 163 (H) 07/20/2020    AST 25 07/20/2020    ALT 19 07/20/2020    LABGLOM >60 07/20/2020    GFRAA >60 07/20/2020     Lab Results   Component Value Date    CEA 5.9 (H) 07/20/2020      Impression/Plan:  70 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 KRAS Mutation: Mutation detected. BRAF Mutation: Mutation not detected. 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 CT scan abdomen/pelvis on 10/26/2015 revealed small nodules at the lung bases, extensive liver lesions consistent with metastatic rectosigmoid cancer. CEA 3488 on 10/30/2015. Bone scan on 11/10/2015 noted no metastatic disease. CT chest on 11/10/2015 revealed Multiple pulmonary nodules in the upper and lower lobes consistent with metastatic disease; Hepatic metastasis also visualized. 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 Mediport was placed. Cycle # 1 of FOLFOX + Avastin was on 11/23/2015. CEA was 4555 on 11/23/2015. Cycle # 2 of FOLFOX + Avastin was on 12/08/2015. CEA was 3160 on 12/08/2015. Cycle # 3 of FOLFOX + Avastin was on 12/22/2015. CEA was 2516 on 12/21/2015. Cycle # 4 of FOLFOX + Avastin was on 01/05/2016. CEA was 2098 on 01/05/2015. Cycle # 5 of FOLFOX + Avastin was on 01/19/2016. CEA was 1511 on 01/05/2015.     -Bone scan on 01/26/2016 noted no metastatic disease. CT chest on 01/26/2016 revealed Significant response to treatment with no visible residual nodules. CT scan abdomen/pelvis on 01/26/2016 noted Interval decreased size of multiple masses in the liver compatible with treatment response. Continue another 2 months of FOLFOX + Avastin and repeat scans.   Cycle # 6 of FOLFOX + Avastin was on 02/02/2016.  on 02/02/2016. Cycle # 7 of FOLFOX + Avastin was on 02/16/2016.  on 02/16/2016. Cycle # 8 of FOLFOX + Avastin was on 03/01/2016.  on 03/01/2016. Cycle # 9 of FOLFOX + Avastin was on 03/15/2016.  on 03/15/2016. Cycle # 10 of FOLFOX + Avastin was on 03/29/2016. .4 on 03/29/2016. Cycle # 11 of FOLFOX + Avastin was on 04/12/2016. .4 on 04/12/2016.     Re-staging scans on 04/19/2016: CT Chest: clear lungs; no evidence of recurring pulmonary nodule; CT Abdomen/Pelvis: Further interval decrease in size of the multiple metastatic hepatic lesions, the largest lesion now measures 3.9 x 3.5 cm and previously measured 4.5 cm in maximum diameter. No mesenteric lymphadenopathy is identified; Bone Scan: No evidence of osseous metastasis. Continue same regimen and re-stage in 2-3 months. Cycle # 12 FOLFOX + Avastin was on 04/26/2016. .5 on 04/26/2016. Cycle # 13 FOLFOX + Avastin was on 05/10/2016. .3 on 05/10/2016. Cycle # 14 FOLFOX+AVASTIN was on 05/24/2016. .5 on 05/24/2016. Cycle # 15 FOLFOX + Avastin was on 06/07/2016. CEA 90.5 on 06/07/2016. [facility]/2016-06/16/2016 for abdominal pain: EGD noted 1.5 cm clean based duodenal bulb ulceration s/p epinephrine and bicap per Dr. Nelly Allen. No active bleeding. A. Stomach, biopsy: Mild chronic gastritis, immunostain negative for Helicobacter  B. Esophagus, biopsy: Gastric glandular mucosa with prominent ntestinal metaplasia (Mad[place]'s epithelium), negative for epithelial dysplasia, esophageal squamous mucosa not identified     Cycle # 16 FOLFOX (discontinued avastin (bevacizumab) given association of peptic ulcer disease and known association of GI perforation.) was on 06/21/2016. CEA 47 on 06/21/2016. Cycle # 17 FOLFOX was on 07/05/2016. CEA 52.6 on 07/05/2016. CEA 47.6 on 07/19/2016.     Re-staging scans 07/19/2106: CT Chest negative for metastatic disease.    CT Abdomen/Pelvis: Stable hepatic lesions; Question new mural thickening in the cecum. Bone Scan: New Let hip lesion suspicious for bone metastasis.     Increased CEA; new mural thickening in the cecum and new left hip lesion suspicious for bone metastasis are consistent with disease progression; He derived maximum benefit from FOLFOX/AVASTIN. D/C FOLFOX/AVASTIN. We recommended FOLFIRI second line therapy. Cycle # 1 FOLFIRI was on 08/02/2016. CEA 40.8 on 08/02/2016. Xgeva q4 weeks started on 08/02/2016. Cycle # 2 FOLFIRI was on 08/16/2016. CEA 31.7 on 08/16/2016. Cycle # 3 FOLFIRI (added Avastin) was on 08/30/2016 given that ulcers healed on EGD 08/15/2016 by Dr. Edel Crook; Protonix bid since avastin re-started. Colonoscopy on 08/29/2016 (to look at the cecal area) unremarkable. CEA 26.7 on 08/30/2016. Cycle # 4 FOLFIRI/Avastin was on 09/13/2016. CEA 23.4 on 09/13/2016. Cycle # 5 FOLFIRI/Avastin was on 09/27/2016. CEA 22.3 on 09/27/2016. Cycle # 6 FOLFIRI/Avastin was on 10/11/2016. CEA 18.4 on 10/11/2016.      Bone scan 10/21/2016 stable bone metastasis; ? New lesion anterior left sixth rib likely interval healing fracture. CT abdomen/pelvis revealed stable hepatic metastasis. CT chest negative for metastatic disease. Continue FOLFIRI/Avastin and repeat scans in 3 months. Cycle # 7 FOLFIRI/Avastin was on 10/25/2016. CEA 16.1 on 10/25/2016. Cycle # 8 FOLFIRI/Avastin was on 11/08/2016. CEA 15.7 on 11/08/2016. Cycle # 9 FOLFIRI/Avastin was on 11/29/2016. CEA 13.6 on 11/29/2016. Cycle # 10 FOLFIRI/Avastin was on 12/13/2016. CEA 10.6 on 12/13/2016. Cycle # 11 FOLFIRI/Avastin was on 12/27/2016. CEA 11.1 on 12/27/2016. Cycle # 12 FOLFIRI/Avastin was on 01/10/2017. CEA 9.7 on 01/10/2017. CT chest 01/23/2017 No new pulmonary nodule or lymphadenopathy. Patchy groundglass opacities within the left lower lobe, suggestive of infectious or inflammatory etiology. Followup CT chest in 3 months.  Slight increased linear sclerosis along the left anterior sixth rib corresponding to an area of increased uptake on the prior bone scan from 10/21/2016, favored to be related to interval healing changes of a nondisplaced fracture. CT abdomen/pelvis on 01/23/2017 Redemonstration of multiple hepatic metastases, which are similar compared to the prior CT from 10/21/2016. Redemonstration of area of increased sclerosis along the right acetabulum suspicious for metastatic disease. Bone scan on 01/23/2017 Stable subtle uptake within the left proximal diaphysis, again suggestive of metastatic disease  Decreased subtle uptake within the medial right acetabulum. Decreased uptake within the left anterior sixth rib corresponding to linear sclerosis on the recent CT, favored to be related to an joint rib fracture. Continue FOLFIRI + Avastin and repeat scans in 3 months. Cycle # 13 FOLFIRI + Avastin was on 01/24/2017. Cycle # 14 FOLFIRI + Avastin was on 02/07/2017. Cycle # 15 FOLFIRI + Avastin was on 02/21/2017. Cycle # 16 FOLFIRI + Avastin was on 03/07/2017. Cycle # 17 FOLFIRI + Avastin was on 03/21/2017. Cycle # 18 FOLFIRI + Avastin was on 04/04/2017. CT chest 04/17/2017 negative for metastatic disease. CT abdomen/pelvis 04/17/2017 Stable hypodense metastatic lesions within the liver. Stable area of increased sclerosis along the right acetabulum  Bone scan 04/17/2017 Stable subtle uptake within the left proximal femoral diaphysis; No definite abnormal uptake in the region of a sclerotic lesion along the posterior column of the right acetabulum noted on CT. Stable slight uptake within the left anterior sixth rib corresponding to linear sclerosis on the recent CT, favored to be related to a fracture. Continue FOLFIRI + Avastin and repeat scans in 3 months. Cycle # 19 FOLFIRI + Avastin was on 04/18/2017. CEA 7.5 on 04/18/2017. Cycle # 20 FOLFIRI + Avastin was on 05/02/2017. CEA 7.7 on 05/02/2017.     Cycle # 21 FOLFIRI + Avastin was on 05/16/2017. CEA 7.1 on 05/16/2017. Cycle # 22 FOLFIRI + Avastin was on 05/30/2017. CEA 8.2 on 05/30/2017. Cycle # 23 FOLFIRI + Avastin was on 06/13/2017. CEA 7.7 on 06/13/2017. Cycle # 24 FOLFIRI + Avastin was on 06/27/2017. CEA 6.6 on 06/27/2017. Re-staging scans 07/05/2017:  Bone scan stable proximal left femoral diaphysis, right acetabulum, and left anterior sixth rib lesions;  CT abdomen/pelvis stable hypodense metastatic lesions within the liver; CT chest without convincing evidence of metastatic disease. Cycle # 25 FOLFIRI + Avastin was on 07/11/2017. CEA 6.3 on 07/11/2017. Cycle # 26 FOLFIRI + Avastin was on 07/25/2017. CEA 7.3 on 07/25/2017. Cycle # 27 FOLFIRI + Avastin was on 08/08/2017. CEA 6.5 on 08/08/2017. Cycle # 28 FOLFIRI + Avastin was on 08/22/2017. CEA 5.9 on 08/22/2017. Cycle # 29 FOLFIRI + Avastin was on 09/05/2017. CEA 6.3 on 09/05/2017. Cycle # 30 FOLFIRI + Avastin was on 09/19/2017. CEA 6.3 on 09/19/2017. Bone scan 09/26/2017 stable. CT abdomen/pelvis on 09/26/2017 Stable hypodense mass in the liver. Stable sclerotic lesion in the acetabulum. CT chest 09/26/2017 negative for metastatic disease. Cycle # 31 FOLFIRI + Avastin was on 10/03/2017. CEA 7.4 on 10/03/2017. Cycle # 32 FOLFIRI + Avastin was on 10/17/2017. CEA 6.0 on 10/17/2017. Cycle # 33 FOLFIRI + Avastin was on 10/31/2017. CEA 6.8 on 10/31/2017. Cycle # 34 FOLFIRI + Avastin was on 11/14/2017. CEA 7.2 on 11/14/2017. Cycle # 35 FOLFIRI + Avastin was on 11/28/2017. CEA 5.1 on 11/28/2017. Cycle # 36 FOLFIRI + Avastin was on 12/12/2017. CEA 6.1 on 12/12/2017. Bone scan 12/22/2017 noted no evidence of metastatic disease to the axial or appendicular skeleton. CT chest 12/22/2017 noted no evidence of metastatic disease. CT abdomen/pelvis 12/22/2017 noted stable hypodense lesions in the liver. Continue FOLFIRI + Avastin and repeat scans in 3 months. Cycle # 37 FOLFIRI + Avastin was on 12/27/2018. CEA 6.7 on 12/27/2017. Cycle # 38 FOLFIRI + Avastin was on 01/09/2018. CEA 5.0 on 01/09/2018. Cycle # 39 FOLFIRI + Avastin was on 01/23/2018. CEA 6.5 on 01/23/2018. Cycle # 40 FOLFIRI + Avastin was on 02/06/2018. CEA 6.9 on 02/06/2018. Cycle # 41 FOLFIRI + Avastin was on 02/20/2018. CEA 6.4 on 02/20/2018. Cycle # 42 FOLFIRI + Avastin was on 03/06/2018. CEA 6.6 on 03/06/2018. Cycle # 43 FOLFIRI + Avastin was on 03/20/2018. CEA 5.7 on 03/20/2018. Bone scan on 03/26/2018 negative for metastatic disease. CT chest 03/26/2018 noted ? new 7 mm nodule in LUCY. CT abdomen/pelvis 03/26/2018 noted stable hypodense lesions in the liver. ? New small ascites in the abdomen. Patient wants to continue to monitor the lung finding and repeat scans in 2 months instead of changing the current chemotherapy regimen to oral Lonsurf. Cycle # 44 FOLFIRI + Avastin was on 04/03/2018. CEA 6.3 on 04/03/2018. Cycle # 45 FOLFIRI + Avastin was on 04/24/2018. CEA 5.3 on 04/24/2018. Cycle # 46 FOLFIRI + Avastin was on 05/08/2018. CEA 5.4 on 05/08/2018. Cycle # 47 FOLFIRI + Avastin was on 05/22/2018. CEA 6.1 on 05/22/2018. CT chest 05/31/2018 noted stable nodule in LUCY. No evidence of worsening malignancy. CT abdomen/pelvis on 05/31/2018 noted stable liver metastasis. No evidence of worsening malignancy. Continue FOLFIRI + Avastin and repeat scans in 3 months. Cycle # 48 FOLFIRI + Avastin was on 06/06/2018. CEA 6.3 on 06/05/2018. Cycle # 49 FOLFIRI + Avastin was on 06/19/2018. CEA 6.1 on 06/19/2018. Cycle # 50 FOLFIRI + Avastin was on 07/03/2018. CEA 7.4 on 07/03/2018. Cycle # 51 FOLFIRI + Avastin was on 07/24/2018. CEA 6.7 on 07/24/2018. Cycle # 52 FOLFIRI + Avastin was on 08/14/2018. CEA 6.8 on 08/14/2018. Cycle # 53 FOLFIRI + Avastin was on 08/28/2018. CEA 6.5 on 08/27/2018. CT chest 09/06/2018 noted interval decrease in size of LUCY measuring up to 4 mm, suggestive of treatment response.  No mediastinal or hilar LN  CT abdomen/pelvis 09/06/2018 Slight interval increase in size of a previously noted metastatic lesion within the right hepatic lobe. This may be related to differences in phase of enhancement compared to the most recent study from 5/31/2018, the lesion remains decreased in size compared to the more previous studies. No abdominal, retroperitoneal, or pelvic lymphadenopathy. Continue FOLFIRI + Avastin and repeat scans in 2 months. Cycle # 54 FOLFIRI + Avastin was on 09/11/2018. CEA 6.4 on 09/10/2018. Cycle # 55 FOLFIRI + Avastin was on 09/25/2018. CEA 6.4 on 09/25/2018. Cycle # 56 FOLFIRI + Avastin was on 10/09/2018. CEA 6.7 on 10/08/2018. Cycle # 57 FOLFIRI + Avastin was on 10/23/2018. CEA 7.2 on 10/22/2018. CT chest 11/02/2018 stable 3 mm nodule within LUCY. No new right and left lung nodule. No mediastinal or osseous lesion. CT abdomen/pelvis 11/02/2018 stable metastatic disease to liver. No new metastatic disease identified. No mesenteric or retroperitoneal LN. No gross colonic lesion identified. Continue FOLFIRI + Avastin and repeat scans in 3 months. Cycle # 58 FOLFIRI + Avastin was on 11/06/2018. CEA 7.6 on 11/05/2018. Cycle # 59 FOLFIRI + Avastin was on 11/27/2018. CEA 6.9 on 11/26/2018. Cycle # 60 FOLFIRI + Avastin was on 12/11/2018. CEA 6.7 on 12/11/2018. Cycle # 61 FOLFIRI + Avastin was on 01/08/2019. CEA 5.6 on 01/07/2019. Cycle # 62 FOLFIRI + Avastin was on 01/29/2019. CEA 4.6 on 01/28/2019. Cycle # 63 FOLFIRI + Avastin was on 02/12/2019. CEA 4.3 on 02/11/2019. CT chest 02/21/2019 no evidence of metastatic disease. CT abdomen/pelvis 02/21/2019 stable hypodense liver lesions. No evidence of worsening metastatic disease. 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 Ordered MRI thoracic spine and referred to neurosurgery team.  Cycle # 64 FOLFIRI + Avastin was on 02/26/2019. CEA 4.7 on 02/25/2019. Cycle # 65 FOLFIRI + Avastin was on 03/12/2019.  CEA 4.0 on 03/11/2019. Cycle # 66 FOLFIRI + Avastin was on 03/26/2019. CEA 4.7 on 03/25/2019. Cycle # 67 FOLFIRI + Avastin was on 04/09/2019. CEA 5.6 on 04/08/2019. Cycle # 68 FOLFIRI + Avastin was on 04/30/2019. CEA 4.9 on 04/29/2019. Cycle # 69 FOLFIRI + Avastin was on 05/14/2019. CEA 5.3 on 05/14/2019. CT chest 05/23/2019 stable small lung nodule with no evidence of metastatic disease. CT abdomen/pelvis 05/23/2019 Decrease conspicuity of the liver lesions. No new lesions seen. Stable splenomegaly. Continue FOLFIRI + Avastin and repeat scans in 3 months. Cycle # 70 FOLFIRI + Avastin was on 06/04/2019. CEA 4.8 on 06/03/2019. Cycle # 71 FOLFIRI + Avastin was on 06/18/2019. CEA 4.6 on 06/17/2019. Cycle # 72 FOLFIRI + Avastin was on 07/09/2019. CEA 4.3 on 07/08/2019. Cycle # 73 FOLFIRI + Avastin was on 07/30/2019. CEA 5.0 on 07/29/2019. Cycle # 74 FOLFIRI + Avastin was on 08/13/2019. CEA 5.0 on 08/12/2019. CT chest 08/20/2019 negative  CT abdomen/pelvis 08/20/2019 Previous identified hepatic lesions are not visualized on the current exam.  Continue FOLFIRI + Avastin and repeat scans in 3 months. Cycle # 75 FOLFIRI + Avastin was on 08/27/2019. CEA 5.0 on 08/26/2019. Cycle # 76 FOLFIRI + Avastin was on 09/17/2019. CEA 5.5 on 09/16/2019. Cycle # 77 FOLFIRI + Avastin was on 10/15/2019. CEA 4.6 on 10/15/2019. Cycle # 78 FOLFIRI + Avastin was on 10/29/2019. CEA 4.1 on 10/28/2019. Cycle # 79 FOLFIRI + Avastin was on 11/12/2019. CEA 4.3 on 11/12/2019. Cycle # 80 FOLFIRI + Avastin was on 12/10/2019. CEA 4.0 on 12/09/2019. CT chest 12/19/2019 Stable 3 mm nodule within the left upper lobe, similar to the previous studies dating back to 11/2/2018, however decreased in size compared to the CT from 3/26/2018. No thoracic LN.    CT abdomen/pelvis 12/19/2019 Previously described small hypodense lesions are more conspicuous on the current study compared to the recent study throughout the liver from 8/20/2019, however similar to the prior study from 2/21/2019. Findings may be related to differences in contrast opacification. No abdominal or pelvic lymphadenopathy. Continue FOLFIRI + Avastin and repeat scans in 2 months to f/u on liver lesions. Cycle # 81 FOLFIRI + Avastin was on 01/07/2020. CEA 3.8 on 01/06/2020. Cycle # 82 FOLFIRI + Avastin was on 01/21/2020. CEA 3.7 on 01/20/2020. Cycle # 83 FOLFIRI + Avastin was on 02/04/2020. CEA 4.1 on 02/03/2020. Cycle # 84 FOLFIRI + Avastin was on 02/18/2020. CEA 4.6 on 02/17/2020. CT chest 2/28/2020 no evidence of metastatic disease to the lungs and no mediastinal or hilar adenopathy. CT abdomen pelvis 2/28/2020 no enhancing lesions seen within the liver to suggest metastatic disease. Wall thickening of the rectosigmoid junction. Images reviewed. Continue FOLFIRI Avastin and repeat scans in 3 months  EGD by Dr. Lisa Hdz 03/02/2020 showing no masses or lesions. Cycle # 85 FOLFIRI + Avastin was on 03/03/2020. CEA 7.4 on 03/02/2020. Cycle # 86 FOLFIRI + Avastin was on 03/17/2020. CEA 4.5 on 03/16/2020. Colonoscopy on 03/23/2020 by Dr. Lisa Hdz unremarkable (records requested). Cycle # 87 FOLFIRI + Avastin was on 03/31/2020. CEA 4.1 on 03/30/2020. Cycle # 88 FOLFIRI + Avastin was on 04/21/2020. CEA 6.4 on 04/20/2020. Cycle # 89 FOLFIRI + Avastin was on 05/05/2020. CEA 5.8 on 05/04/2020. Cycle # 90 FOLFIRI + Avastin was on 06/02/2020. CEA 5.5 on 06/01/2020. Cycle # 91 FOLFIRI + Avastin was on 06/16/2020. CEA 5.6 on 06/15/2020. CT chest 06/23/2020 noted no metastatic disease in the chest.   CT abdomen/pelvis 06/23/2020 Stable small liver lesions measuring up to 5 mm since 2019.   22 mm round mass in segment 8 of the liver with central high density stable from December 2019), previously measuring up to 34 mm in 2017. No additional metastatic disease in the abdomen or pelvis. Small amount of ascites. Overall stable disease.  Continue FOLFIRI + Avastin and repeat scans in 2-3 months. Cycle # 92 FOLFIRI + Avastin was on 07/07/2020. CEA 5.7 on 07/06/2020. Cycle # 93 FOLFIRI + Avastin is today 07/21/2020. CEA 5.9 on 07/20/2020. RTC 2 weeks for Cycle # 94 FOLFIRI + Avastin.   MSI testing noted no mismatch repair protein loss of expression    7/21/2020  Dc Calhoun MD  Board Certified Medical Oncologist

## 2020-07-23 ENCOUNTER — HOSPITAL ENCOUNTER (OUTPATIENT)
Dept: INFUSION THERAPY | Age: 70
Discharge: HOME | End: 2020-07-23
Payer: MEDICARE

## 2020-07-23 DIAGNOSIS — C20 RECTAL CANCER METASTASIZED TO LIVER (HCC): ICD-10-CM

## 2020-07-23 DIAGNOSIS — C78.7 RECTAL CANCER METASTASIZED TO LIVER (HCC): ICD-10-CM

## 2020-07-23 DIAGNOSIS — Z51.11 ENCOUNTER FOR ANTINEOPLASTIC CHEMOTHERAPY: Primary | ICD-10-CM

## 2020-07-23 PROCEDURE — 96372 THER/PROPH/DIAG INJ SC/IM: CPT

## 2020-07-23 PROCEDURE — 6360000002 HC RX W HCPCS: Performed by: INTERNAL MEDICINE

## 2020-07-23 RX ADMIN — PEGFILGRASTIM 6 MG: 6 INJECTION SUBCUTANEOUS at 13:52

## 2020-08-03 ENCOUNTER — HOSPITAL ENCOUNTER (OUTPATIENT)
Age: 70
Discharge: HOME | End: 2020-08-03
Payer: MEDICARE

## 2020-08-03 LAB
ALBUMIN SERPL-MCNC: 3.6 G/DL (ref 3.5–5.2)
ALP BLD-CCNC: 155 U/L (ref 40–129)
ALT SERPL-CCNC: 15 U/L (ref 0–40)
ANION GAP SERPL CALCULATED.3IONS-SCNC: 12 MMOL/L (ref 7–16)
ANISOCYTOSIS: ABNORMAL
AST SERPL-CCNC: 24 U/L (ref 0–39)
BASOPHILS ABSOLUTE: 0 E9/L (ref 0–0.2)
BASOPHILS RELATIVE PERCENT: 0 % (ref 0–2)
BILIRUB SERPL-MCNC: 0.8 MG/DL (ref 0–1.2)
BUN BLDV-MCNC: 12 MG/DL (ref 8–23)
CALCIUM SERPL-MCNC: 8.8 MG/DL (ref 8.6–10.2)
CEA: 5.5 NG/ML (ref 0–5.2)
CHLORIDE BLD-SCNC: 103 MMOL/L (ref 98–107)
CO2: 25 MMOL/L (ref 22–29)
CREAT SERPL-MCNC: 1 MG/DL (ref 0.7–1.2)
EOSINOPHILS ABSOLUTE: 0.02 E9/L (ref 0.05–0.5)
EOSINOPHILS RELATIVE PERCENT: 1 % (ref 0–6)
GFR AFRICAN AMERICAN: >60
GFR NON-AFRICAN AMERICAN: >60 ML/MIN/1.73
GLUCOSE FASTING: 117 MG/DL (ref 74–99)
HCT VFR BLD CALC: 24.6 % (ref 37–54)
HEMOGLOBIN: 7.6 G/DL (ref 12.5–16.5)
HYPOCHROMIA: ABNORMAL
LYMPHOCYTES ABSOLUTE: 0.39 E9/L (ref 1.5–4)
LYMPHOCYTES RELATIVE PERCENT: 23 % (ref 20–42)
MCH RBC QN AUTO: 32.8 PG (ref 26–35)
MCHC RBC AUTO-ENTMCNC: 30.9 % (ref 32–34.5)
MCV RBC AUTO: 106 FL (ref 80–99.9)
MONOCYTES ABSOLUTE: 0.1 E9/L (ref 0.1–0.95)
MONOCYTES RELATIVE PERCENT: 6 % (ref 2–12)
NEUTROPHILS ABSOLUTE: 1.19 E9/L (ref 1.8–7.3)
NEUTROPHILS RELATIVE PERCENT: 70 % (ref 43–80)
OVALOCYTES: ABNORMAL
PDW BLD-RTO: 18.3 FL (ref 11.5–15)
PLATELET # BLD: 82 E9/L (ref 130–450)
PLATELET CONFIRMATION: NORMAL
PMV BLD AUTO: 10.6 FL (ref 7–12)
POIKILOCYTES: ABNORMAL
POTASSIUM SERPL-SCNC: 3.8 MMOL/L (ref 3.5–5)
RBC # BLD: 2.32 E12/L (ref 3.8–5.8)
SODIUM BLD-SCNC: 140 MMOL/L (ref 132–146)
TEAR DROP CELLS: ABNORMAL
TOTAL PROTEIN: 6.2 G/DL (ref 6.4–8.3)
WBC # BLD: 1.7 E9/L (ref 4.5–11.5)

## 2020-08-03 PROCEDURE — 85025 COMPLETE CBC W/AUTO DIFF WBC: CPT

## 2020-08-03 PROCEDURE — 82378 CARCINOEMBRYONIC ANTIGEN: CPT

## 2020-08-03 PROCEDURE — 36415 COLL VENOUS BLD VENIPUNCTURE: CPT

## 2020-08-03 PROCEDURE — 80053 COMPREHEN METABOLIC PANEL: CPT

## 2020-08-04 ENCOUNTER — OFFICE VISIT (OUTPATIENT)
Dept: ONCOLOGY | Age: 70
End: 2020-08-04
Payer: MEDICARE

## 2020-08-04 ENCOUNTER — HOSPITAL ENCOUNTER (OUTPATIENT)
Dept: INFUSION THERAPY | Age: 70
Discharge: HOME | End: 2020-08-04
Payer: MEDICARE

## 2020-08-04 ENCOUNTER — CLINICAL DOCUMENTATION (OUTPATIENT)
Dept: INFUSION THERAPY | Age: 70
End: 2020-08-04

## 2020-08-04 VITALS
DIASTOLIC BLOOD PRESSURE: 61 MMHG | SYSTOLIC BLOOD PRESSURE: 116 MMHG | BODY MASS INDEX: 25.33 KG/M2 | TEMPERATURE: 97.6 F | OXYGEN SATURATION: 100 % | WEIGHT: 192 LBS | HEART RATE: 69 BPM

## 2020-08-04 VITALS — DIASTOLIC BLOOD PRESSURE: 67 MMHG | HEART RATE: 59 BPM | SYSTOLIC BLOOD PRESSURE: 117 MMHG

## 2020-08-04 DIAGNOSIS — C20 RECTAL CANCER METASTASIZED TO LIVER (HCC): Primary | ICD-10-CM

## 2020-08-04 DIAGNOSIS — C79.51 BONE METASTASIS (HCC): ICD-10-CM

## 2020-08-04 DIAGNOSIS — C78.7 RECTAL CANCER METASTASIZED TO LIVER (HCC): Primary | ICD-10-CM

## 2020-08-04 PROCEDURE — 96375 TX/PRO/DX INJ NEW DRUG ADDON: CPT

## 2020-08-04 PROCEDURE — 99214 OFFICE O/P EST MOD 30 MIN: CPT | Performed by: INTERNAL MEDICINE

## 2020-08-04 PROCEDURE — 96413 CHEMO IV INFUSION 1 HR: CPT

## 2020-08-04 PROCEDURE — 96372 THER/PROPH/DIAG INJ SC/IM: CPT

## 2020-08-04 PROCEDURE — 96417 CHEMO IV INFUS EACH ADDL SEQ: CPT

## 2020-08-04 PROCEDURE — 96368 THER/DIAG CONCURRENT INF: CPT

## 2020-08-04 PROCEDURE — 6360000002 HC RX W HCPCS: Performed by: INTERNAL MEDICINE

## 2020-08-04 PROCEDURE — 96415 CHEMO IV INFUSION ADDL HR: CPT

## 2020-08-04 PROCEDURE — 2580000003 HC RX 258: Performed by: INTERNAL MEDICINE

## 2020-08-04 PROCEDURE — 96411 CHEMO IV PUSH ADDL DRUG: CPT

## 2020-08-04 RX ORDER — SODIUM CHLORIDE 9 MG/ML
250 INJECTION, SOLUTION INTRAVENOUS CONTINUOUS
Status: CANCELLED | OUTPATIENT
Start: 2020-08-04

## 2020-08-04 RX ORDER — FLUOROURACIL 50 MG/ML
850 INJECTION, SOLUTION INTRAVENOUS ONCE
Status: CANCELLED | OUTPATIENT
Start: 2020-08-04

## 2020-08-04 RX ORDER — PALONOSETRON HYDROCHLORIDE 0.05 MG/ML
0.25 INJECTION, SOLUTION INTRAVENOUS ONCE
Status: COMPLETED | OUTPATIENT
Start: 2020-08-04 | End: 2020-08-04

## 2020-08-04 RX ORDER — DEXAMETHASONE SODIUM PHOSPHATE 10 MG/ML
10 INJECTION INTRAMUSCULAR; INTRAVENOUS ONCE
Status: COMPLETED | OUTPATIENT
Start: 2020-08-04 | End: 2020-08-04

## 2020-08-04 RX ORDER — ATROPINE SULFATE 0.4 MG/ML
0.4 AMPUL (ML) INJECTION ONCE
Status: COMPLETED | OUTPATIENT
Start: 2020-08-04 | End: 2020-08-04

## 2020-08-04 RX ORDER — ATROPINE SULFATE 0.4 MG/ML
0.4 AMPUL (ML) INJECTION ONCE
Status: CANCELLED | OUTPATIENT
Start: 2020-08-04

## 2020-08-04 RX ORDER — DIPHENHYDRAMINE HYDROCHLORIDE 50 MG/ML
50 INJECTION INTRAMUSCULAR; INTRAVENOUS ONCE
Status: CANCELLED | OUTPATIENT
Start: 2020-08-04

## 2020-08-04 RX ORDER — SODIUM CHLORIDE 9 MG/ML
INJECTION, SOLUTION INTRAVENOUS CONTINUOUS
Status: CANCELLED | OUTPATIENT
Start: 2020-08-04

## 2020-08-04 RX ORDER — EPINEPHRINE 1 MG/ML
0.3 INJECTION, SOLUTION, CONCENTRATE INTRAVENOUS PRN
Status: CANCELLED | OUTPATIENT
Start: 2020-08-04

## 2020-08-04 RX ORDER — HEPARIN SODIUM (PORCINE) LOCK FLUSH IV SOLN 100 UNIT/ML 100 UNIT/ML
500 SOLUTION INTRAVENOUS PRN
Status: DISCONTINUED | OUTPATIENT
Start: 2020-08-04 | End: 2020-08-05 | Stop reason: HOSPADM

## 2020-08-04 RX ORDER — SODIUM CHLORIDE 9 MG/ML
250 INJECTION, SOLUTION INTRAVENOUS CONTINUOUS
Status: DISCONTINUED | OUTPATIENT
Start: 2020-08-04 | End: 2020-08-05 | Stop reason: HOSPADM

## 2020-08-04 RX ORDER — PALONOSETRON HYDROCHLORIDE 0.05 MG/ML
0.25 INJECTION, SOLUTION INTRAVENOUS ONCE
Status: CANCELLED | OUTPATIENT
Start: 2020-08-04

## 2020-08-04 RX ORDER — 0.9 % SODIUM CHLORIDE 0.9 %
10 VIAL (ML) INJECTION ONCE
Status: CANCELLED | OUTPATIENT
Start: 2020-08-04

## 2020-08-04 RX ORDER — METHYLPREDNISOLONE SODIUM SUCCINATE 125 MG/2ML
125 INJECTION, POWDER, LYOPHILIZED, FOR SOLUTION INTRAMUSCULAR; INTRAVENOUS ONCE
Status: CANCELLED | OUTPATIENT
Start: 2020-08-04

## 2020-08-04 RX ORDER — DIPHENOXYLATE HYDROCHLORIDE AND ATROPINE SULFATE 2.5; .025 MG/1; MG/1
1 TABLET ORAL 4 TIMES DAILY PRN
Qty: 40 TABLET | Refills: 2 | Status: SHIPPED | OUTPATIENT
Start: 2020-08-04 | End: 2021-02-18 | Stop reason: SDUPTHER

## 2020-08-04 RX ORDER — FLUOROURACIL 50 MG/ML
850 INJECTION, SOLUTION INTRAVENOUS ONCE
Status: COMPLETED | OUTPATIENT
Start: 2020-08-04 | End: 2020-08-04

## 2020-08-04 RX ORDER — SODIUM CHLORIDE 0.9 % (FLUSH) 0.9 %
10 SYRINGE (ML) INJECTION PRN
Status: CANCELLED | OUTPATIENT
Start: 2020-08-04

## 2020-08-04 RX ORDER — SODIUM CHLORIDE 0.9 % (FLUSH) 0.9 %
10 SYRINGE (ML) INJECTION PRN
Status: DISCONTINUED | OUTPATIENT
Start: 2020-08-04 | End: 2020-08-05 | Stop reason: HOSPADM

## 2020-08-04 RX ORDER — HEPARIN SODIUM (PORCINE) LOCK FLUSH IV SOLN 100 UNIT/ML 100 UNIT/ML
500 SOLUTION INTRAVENOUS PRN
Status: CANCELLED | OUTPATIENT
Start: 2020-08-04

## 2020-08-04 RX ORDER — DEXAMETHASONE SODIUM PHOSPHATE 10 MG/ML
10 INJECTION, SOLUTION INTRAMUSCULAR; INTRAVENOUS ONCE
Status: CANCELLED | OUTPATIENT
Start: 2020-08-04

## 2020-08-04 RX ADMIN — ATROPINE SULFATE 0.4 MG: 0.4 INJECTION, SOLUTION INTRAMUSCULAR; INTRAVENOUS; SUBCUTANEOUS at 09:15

## 2020-08-04 RX ADMIN — SODIUM CHLORIDE 400 MG: 9 INJECTION, SOLUTION INTRAVENOUS at 09:50

## 2020-08-04 RX ADMIN — BEVACIZUMAB 400 MG: 400 INJECTION, SOLUTION INTRAVENOUS at 09:17

## 2020-08-04 RX ADMIN — Medication 500 UNITS: at 11:44

## 2020-08-04 RX ADMIN — DEXAMETHASONE SODIUM PHOSPHATE 10 MG: 10 INJECTION INTRAMUSCULAR; INTRAVENOUS at 08:59

## 2020-08-04 RX ADMIN — PALONOSETRON 0.25 MG: 0.25 INJECTION, SOLUTION INTRAVENOUS at 08:57

## 2020-08-04 RX ADMIN — FLUOROURACIL 850 MG: 50 INJECTION, SOLUTION INTRAVENOUS at 11:31

## 2020-08-04 RX ADMIN — SODIUM CHLORIDE 250 ML: 9 INJECTION, SOLUTION INTRAVENOUS at 08:46

## 2020-08-04 RX ADMIN — SODIUM CHLORIDE, PRESERVATIVE FREE 10 ML: 5 INJECTION INTRAVENOUS at 11:44

## 2020-08-04 RX ADMIN — LEUCOVORIN CALCIUM 850 MG: 10 INJECTION INTRAMUSCULAR; INTRAVENOUS at 09:49

## 2020-08-04 RX ADMIN — DENOSUMAB 120 MG: 120 INJECTION SUBCUTANEOUS at 09:11

## 2020-08-04 NOTE — PROGRESS NOTES
9:58 AM Call placed to [facility] and spoke with Catie [place]. She confirmed receipt of order.

## 2020-08-04 NOTE — PROGRESS NOTES
[place] [id number]  Attending Clinic Note     Reason for Visit: Follow-up on a patient with Metastatic Rectal Cancer.     PCP: Estela Alfonso MD     History of Present Illness:  70 y/o  male who was referred to see Dr. Demetra Briceno (GI team) for evaluation of bright red blood per rectum, mild anemia and change in bowel habits with diarrhea. CEA 2640 on 10/19/2015. AlcP 299 AST 57 ALT 75 on 10/19/2015. Colonoscopy in 2013 noted no significant polyps, colitis or lesions at that time. Denies any Family History of colorectal cancer or polyps.     Colonoscopy on 10/19/2015 revealed:  1. Ascending polyp, 8 mm, hot snare: Tubulovillous adenoma. 2. Transverse polyp, 1 cm, hot snare: Serrated polyp most consistent with sessile serrated adenoma. 3. Five splenic flexure polyps, three - 5 mm, 7 mm, 8 mm, hot snare and biopsy: Four segments of Tubular Adenoma  4. Descending polyp, 5 mm, biopsy: Tubular adenoma. 5. Sigmoid polyp, 4 mm biopsy, Serrated polyp most consistent with sessile serrated adenoma. 6. Two rectal polyps, 4 mm biopsy: Serrated polyp most consistent with hyperplastic polyp. 7. Rectosigmoid colon mass (large mass approximately 65% circumference of the lumen; Very friable, firm and hard): Tubulovillous adenoma with associated focal erosion and fibroplasia.      CT scan abdomen/pelvis on 10/26/2015:  1. Small nodules at lung bases likely represent metastatic colon   cancer. 2. Extensive likely metastatic colon cancer throughout the liver.      3. Mild mural thickening and luminal narrowing in the   terminal ileum, otherwise nonspecific.      Colonoscopy with snare removal rectal mass was performed by Dr. Estrellita Colindres. Pathology proved:  Rectal polyp: Invasive adenocarcinoma involving villous adenoma and extending to the cauterized edge of excision. KRAS Mutation: Mutation detected.   BRAF Mutation: Mutation not detected. 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     CEA 3488. Bone scan on 11/10/2015 noted no metastatic disease. CT chest on 11/10/2015 revealed Multiple pulmonary nodules in the upper and lower lobes consistent with metastatic disease;   Hepatic metastasis also visualized. 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 Mediport was placed. Cycle # 1 of FOLFOX + Avastin was on 11/23/2015. CEA was 4555 on 11/23/2015. Cycle # 2 of FOLFOX + Avastin was on 12/08/2015. CEA was 3160 on 12/08/2015. Cycle # 3 of FOLFOX + Avastin was on 12/22/2015. CEA was 2516 on 12/21/2015. Cycle # 4 of FOLFOX + Avastin was on 01/05/2016. CEA was 2098 on 01/05/2015. Cycle # 5 of FOLFOX + Avastin was on 01/19/2016. CEA was 1511 on 01/05/2015.     -Bone scan on 01/26/2016 noted no metastatic disease. CT chest on 01/26/2016 revealed Significant response to treatment with no visible residual nodules. CT scan abdomen/pelvis on 01/26/2016 noted Interval decreased size of multiple masses in the liver compatible with treatment response. Continue another 2 months of FOLFOX + Avastin and repeat scans. Cycle # 6 of FOLFOX + Avastin was on 02/02/2016.  on 02/02/2016. Cycle # 7 of FOLFOX + Avastin was on 02/16/2016.  on 02/16/2016. Cycle # 8 of FOLFOX + Avastin was on 03/01/2016.  on 03/01/2016. Cycle # 9 of FOLFOX + Avastin was on 03/15/2016.  on 03/15/2016. Cycle # 10 of FOLFOX + Avastin was on 03/29/2016. .4 on 03/29/2016. Cycle # 11 of FOLFOX + Avastin was on 04/12/2016. .4 on 04/12/2016.     Re-staging scans on 04/19/2016: CT Chest: clear lungs; no evidence of recurring pulmonary nodule; CT Abdomen/Pelvis: Further interval decrease in size of the multiple metastatic hepatic lesions, the largest lesion now measures 3.9 x 3.5 cm and previously measured 4.5 cm in maximum diameter.  No mesenteric lymphadenopathy is identified; Bone Scan: No evidence of osseous metastasis. Continue same regimen and re-stage in 2-3 months. Cycle # 12 FOLFOX + Avastin was on 04/26/2016. .5 on 04/26/2016. Cycle # 13 FOLFOX + Avastin was on 05/10/2016. .3 on 05/10/2016. Cycle # 14 FOLFOX+AVASTIN was on 05/24/2016. .5 on 05/24/2016. Cycle # 15 FOLFOX + Avastin was on 06/07/2016. CEA 90.5 on 06/07/2016. Admitted to [facility] 06/13/2016-06/16/2016 for abdominal pain: EGD noted 1.5 cm clean based duodenal bulb ulceration s/p epinephrine and bicap per Dr. Vineet Aden. No active bleeding. A. Stomach, biopsy: Mild chronic gastritis, immunostain negative for Helicobacter  B. Esophagus, biopsy: Gastric glandular mucosa with prominent intestinal metaplasia ([place]'s epithelium), negative for epithelial dysplasia, esophageal squamous mucosa not identified  Cycle # 16 FOLFOX (discontinued avastin (bevacizumab) given association of peptic ulcer disease and known association of GI perforation) was on 06/21/2016. Cycle # 17 FOLFOX was on 07/05/2016. CEA 52.6 on 07/19/2016. Cycle # 17 FOLFOX was on 07/05/2016. CEA 52.6 on 07/05/2016. CEA 47.6 on 07/19/2016.     Re-staging scans 07/19/2106: CT Chest negative for metastatic disease. CT Abdomen/Pelvis: Stable hepatic lesions; Question new mural thickening in the cecum. Bone Scan: New Let hip lesion suspicious for bone metastasis.     Increased CEA; new mural thickening in the cecum and new left hip lesion suspicious for bone metastasis are consistent with disease progression; He derived maximum benefit from FOLFOX/AVASTIN. D/C FOLFOX/AVASTIN. We recommended FOLFIRI second line therapy. Cycle # 1 FOLFIRI was on 08/02/2016. CEA 40.8 on 08/02/2016. Xgeva q4 weeks started on 08/02/2016. Cycle # 2 FOLFIRI was on 08/16/2016. CEA 31.7 on 08/16/2016. Cycle # 3 FOLFIRI (added Avastin) was on 08/30/2016 given that ulcers healed on EGD 08/15/2016 by Dr. Harvey Azar; Protonix bid since avastin re-started.    Colonoscopy on 08/29/2016 (to look at the cecal area) unremarkable. CEA 26.7 on 08/30/2016. Cycle # 4 FOLFIRI/Avastin was on 09/13/2016. CEA 23.4 on 09/13/2016. Cycle # 5 FOLFIRI/Avastin was on 09/27/2016. CEA 22.3 on 09/27/2016. Cycle # 6 FOLFIRI/Avastin was on 10/11/2016. CEA 18.4 on 10/11/2016.      Bone scan 10/21/2016 stable bone metastasis; ? New lesion anterior left sixth rib likely interval healing fracture. CT abdomen/pelvis revealed stable hepatic metastasis. CT chest negative for metastatic disease. Continue FOLFIRI/Avastin and repeat scans in 3 months. Cycle # 7 FOLFIRI/Avastin was on 10/25/2016. CEA 16.1  Cycle # 8 FOLFIRI/Avastin was on 11/08/2016. CEA 15.7  Cycle # 9 FOLFIRI/Avastin was on 11/29/2016. CEA 13.6 on 11/29/2016. Cycle # 10 FOLFIRI/Avastin was on 12/13/2016. CEA 10.6 on 12/13/2016. Cycle # 11 FOLFIRI/Avastin was on 12/27/2016. CEA 11.1 on 12/27/2016. Cycle # 12 FOLFIRI/Avastin was on 01/10/2017. CEA 9.7 on 01/10/2017.       CT chest 01/23/2017 No new pulmonary nodule or lymphadenopathy. Patchy groundglass opacities within the left lower lobe, suggestive of infectious or inflammatory etiology. Followup CT chest in 3 months. Slight increased linear sclerosis along the left anterior sixth rib corresponding to an area of increased uptake on the prior bone scan from 10/21/2016, favored to be related to interval healing changes of a nondisplaced fracture. CT abdomen/pelvis on 01/23/2017 Redemonstration of multiple hepatic metastases, which are similar compared to the prior CT from 10/21/2016. Redemonstration of area of increased sclerosis along the right acetabulum suspicious for metastatic disease. Bone scan on 01/23/2017 Stable subtle uptake within the left proximal diaphysis, again suggestive of metastatic disease  Decreased subtle uptake within the medial right acetabulum.    Decreased uptake within the left anterior sixth rib corresponding to linear sclerosis on the recent CT, favored to be related to an joint rib fracture. Continue FOLFIRI + Avastin and repeat scans in 3 months. Cycle # 13 FOLFIRI + Avastin was on 01/24/2017. Cycle # 14 FOLFIRI + Avastin was on 02/07/2017. Cycle # 15 FOLFIRI + Avastin was on 02/21/2017. Cycle # 16 FOLFIRI + Avastin was on 03/07/2017. Cycle # 17 FOLFIRI + Avastin was on 03/21/2017. Cycle # 18 FOLFIRI + Avastin was on 04/04/2017. CT chest 04/17/2017 negative for metastatic disease. CT abdomen/pelvis 04/17/2017 Stable hypodense metastatic lesions within the liver. Stable area of increased sclerosis along the right acetabulum  Bone scan 04/17/2017 Stable subtle uptake within the left proximal femoral diaphysis; No definite abnormal uptake in the region of a sclerotic lesion along the posterior column of the right acetabulum noted on CT. Stable slight uptake within the left anterior sixth rib corresponding to linear sclerosis on the recent CT, favored to be related to a fracture. Continue FOLFIRI + Avastin and repeat scans in 3 months. Cycle # 19 FOLFIRI + Avastin was on 04/18/2017. Cycle # 20 FOLFIRI + Avastin was on 05/02/2017. Cycle # 21 FOLFIRI + Avastin was on 05/16/2017. Cycle # 22 FOLFIRI + Avastin was on 05/30/2017. Cycle # 23 FOLFIRI + Avastin was on 06/13/2017. Cycle # 24 FOLFIRI + Avastin was on 06/27/2017. Cycle # 25 FOLFIRI + Avastin was on 07/11/2017. Cycle # 26 FOLFIRI + Avastin was on 07/25/2017. Cycle # 27 FOLFIRI + Avastin was on 08/08/2017. Cycle # 28 FOLFIRI + Avastin was on 08/22/2017. Cycle # 29 FOLFIRI + Avastin was on 09/05/2017. Cycle # 30 FOLFIRI + Avastin was on 09/19/2017. Bone scan 09/26/2017 stable. CT abdomen/pelvis on 09/26/2017 Stable hypodense mass in the liver. Stable sclerotic lesion in the acetabulum. CT chest 09/26/2017 negative for metastatic disease. Cycle # 31 FOLFIRI + Avastin was on 10/03/2017. CEA 7.4 on 10/03/2017. Cycle # 32 FOLFIRI + Avastin was on 10/17/2017.  CEA 6.0 on 10/17/2017. Cycle # 33 FOLFIRI + Avastin was on 10/31/2017. CEA 6.8 on 10/31/2017. Cycle # 34 FOLFIRI + Avastin was on 11/14/2017. CEA 7.2 on 11/14/2017. Cycle # 35 FOLFIRI + Avastin was on 11/28/2017. CEA 5.1 on 11/28/2017. Cycle # 36 FOLFIRI + Avastin was on 12/12/2017. CEA 6.1 on 12/12/2017. Bone scan 12/22/2017 noted no evidence of metastatic disease to the axial or appendicular skeleton. CT chest 12/22/2017 noted no evidence of metastatic disease. CT abdomen/pelvis 12/22/2017 noted stable hypodense lesions in the liver. Continue FOLFIRI + Avastin and repeat scans in 3 months. Cycle # 37 FOLFIRI + Avastin was on 12/27/2018. Cycle # 38 FOLFIRI + Avastin was on 01/09/2018. Cycle # 39 FOLFIRI + Avastin was on 01/23/2018. Cycle # 40 FOLFIRI + Avastin was on 02/06/2018. Cycle # 41 FOLFIRI + Avastin was on 02/20/2018. Cycle # 42 FOLFIRI + Avastin was on 03/06/2018. Cycle # 43 FOLFIRI + Avastin was on 03/20/2018. Bone scan on 03/26/2018 negative for metastatic disease. CT chest 03/26/2018 noted ? new 7 mm nodule in LUCY. CT abdomen/pelvis 03/26/2018 noted stable hypodense lesions in the liver. ? New small ascites in the abdomen. Patient wants to continue to monitor the lung finding and repeat scans in 2 months instead of changing the current regimen into oral Lonsurf. Cycle # 44 FOLFIRI + Avastin was on 04/03/2018. CEA 6.3 on 04/03/2018. Cycle # 45 FOLFIRI + Avastin was on 04/24/2018. CEA 5.3 on 04/24/2018. Cycle # 46 FOLFIRI + Avastin was on 05/08/2018. CEA 5.4 on 05/08/2018. Cycle # 47 FOLFIRI + Avastin was on 05/22/2018. CEA 6.1 on 05/22/2018. CT chest 05/31/2018 noted stable nodule in LUCY. No evidence of worsening malignancy. CT abdomen/pelvis on 05/31/2018 noted stable liver metastasis. No evidence of worsening malignancy. Continue FOLFIRI + Avastin and repeat scans in 3 months. Cycle # 48 FOLFIRI + Avastin was on 06/06/2018. Cycle # 49 FOLFIRI + Avastin was on 06/19/2018. 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 Ordered MRI thoracic spine and referred to neurosurgery team.    Cycle # 64 FOLFIRI + Avastin was on 02/26/2019. CEA 4.7 on 02/25/2019. Cycle # 65 FOLFIRI + Avastin was on 03/12/2019. CEA 4.0 on 03/11/2019. Cycle # 66 FOLFIRI + Avastin was on 03/26/2019. CEA 4.7 on 03/25/2019. Cycle # 67 FOLFIRI + Avastin was on 04/09/2019. CEA 5.6 on 04/08/2019. Cycle # 68 FOLFIRI + Avastin was on 04/30/2019. CEA 4.9 on 04/29/2019. Cycle # 69 FOLFIRI + Avastin was on 05/14/2019. CEA 5.3 on 05/14/2019. CT chest 05/23/2019 stable small lung nodule with no evidence of metastatic disease. CT abdomen/pelvis 05/23/2019 Decrease conspicuity of the liver lesions. No new lesions seen. Stable splenomegaly. Continue FOLFIRI + Avastin and repeat scans in 3 months. Cycle # 70 FOLFIRI + Avastin was on 06/04/2019. CEA 4.8 on 06/03/2019. Cycle # 71 FOLFIRI + Avastin was on 06/18/2019. CEA 4.6 on 06/17/2019. Cycle # 72 FOLFIRI + Avastin was on 07/09/2019. CEA 4.3 on 07/08/2019. Cycle # 73 FOLFIRI + Avastin was on 07/30/2019. CEA 5.0 on 07/29/2019. Cycle # 74 FOLFIRI + Avastin was on 08/13/2019. CEA 5.0 on 08/12/2019. CT chest 08/20/2019 negative  CT abdomen/pelvis 08/20/2019 Previous identified hepatic lesions are not visualized on the current exam.  Continue FOLFIRI + Avastin and repeat scans in 3 months. Cycle # 75 FOLFIRI + Avastin was on 08/27/2019. CEA 5.0 on 08/26/2019. Cycle # 76 FOLFIRI + Avastin was on 09/17/2019. CEA 5.5 on 09/16/2019. Cycle # 77 FOLFIRI + Avastin was on 10/15/2019. CEA 4.6 on 10/15/2019. Cycle # 78 FOLFIRI + Avastin was on 10/29/2019. CEA 4.1 on 10/28/2019. Cycle # 79 FOLFIRI + Avastin was on 11/12/2019. CEA 4.3 on 11/12/2019. Cycle # 80 FOLFIRI + Avastin was on 12/10/2019. CEA 4.0 on 12/09/2019.   CT chest 12/19/2019 Stable 3 mm nodule within the left upper lobe, similar to the previous studies dating back to 11/2/2018, however 5.7 on 07/06/2020. Cycle # 93 FOLFIRI + Avastin was on 07/21/2020. CEA 5.9 on 07/20/2020. Cycle # 94 FOLFIRI + Avastin is today 08/04/2020. CEA 5.5 on 08/04/2020. He presented today 08/04/2020 Cycle # 94 FOLFIRI + Avastin. No fever, chills. No chest pain or SOB. No nausea or vomiting. No diarrhea. Review of Systems;  CONSTITUTIONAL: No fever, chills. Fair appetite and energy level. ENMT: Eyes: No diplopia; Nose: No epistaxis. Mouth:No lesions  RESPIRATORY: No hemoptysis, shortness of breath. CARDIOVASCULAR: No chest pain, palpitations. GASTROINTESTINAL: No nausea/vomiting, abdominal pain. GENITOURINARY: No dysuria, urinary frequency, hematuria. NEURO: No syncope, presyncope, headache. Remainder ROS: Negative. Past Medical History:   Past Medical History               Diagnosis Date    Arthritis      Cancer ([facility] [address]         colon    Depression      Hyperlipidemia      Hypertension           Medications:  Reviewed and reconciled.     Allergies: Allergies   Allergen Reactions    Neosporin [Neomycin-Polymyxin-Gramicidin] Rash    Tape Asuncion Dy Tape] Rash      Physical Exam:  /61 (Site: Left Upper Arm, Position: Sitting, Cuff Size: Medium Adult)   Pulse 69   Temp 97.6 °F (36.4 °C) (Temporal)   Wt 192 lb (87.1 kg)   SpO2 100%   BMI 25.33 kg/m²   GENERAL: Alert, oriented x 3, not in acute distress  HEENT: PERRLA, EOMI. No oral lesions. NECK: Supple. Without lymphadenopathy. LUNGS: Lungs are CTA bilaterally, with no wheezing, crackles or rhonchi. CARDIOVASCULAR: Regular rhythm. No murmurs, rubs or gallops. ABDOMEN: Soft. Non-tender, non-distended. Positive bowel sounds. EXTREMITIES: Without clubbing, cyanosis or edema. NEUROLOGIC: No focal deficits.    ECOG PS 1    Diagnostics:  Lab Results   Component Value Date    WBC 1.7 (L) 08/03/2020    HGB 7.6 (L) 08/03/2020    HCT 24.6 (L) 08/03/2020    .0 (H) 08/03/2020    PLT 82 (L) 08/03/2020     Lab Results   Component Value Date     08/03/2020    K 3.8 08/03/2020     08/03/2020    CO2 25 08/03/2020    BUN 12 08/03/2020    CREATININE 1.0 08/03/2020    GLUCOSE 128 (H) 07/20/2020    CALCIUM 8.8 08/03/2020    PROT 6.2 (L) 08/03/2020    LABALBU 3.6 08/03/2020    BILITOT 0.8 08/03/2020    ALKPHOS 155 (H) 08/03/2020    AST 24 08/03/2020    ALT 15 08/03/2020    LABGLOM >60 08/03/2020    GFRAA >60 08/03/2020     Lab Results   Component Value Date    CEA 5.5 (H) 08/03/2020      Impression/Plan:  68 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 KRAS Mutation: Mutation detected. BRAF Mutation: Mutation not detected. 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 CT scan abdomen/pelvis on 10/26/2015 revealed small nodules at the lung bases, extensive liver lesions consistent with metastatic rectosigmoid cancer. CEA 3488 on 10/30/2015. Bone scan on 11/10/2015 noted no metastatic disease. CT chest on 11/10/2015 revealed Multiple pulmonary nodules in the upper and lower lobes consistent with metastatic disease; Hepatic metastasis also visualized. 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 Mediport was placed. Cycle # 1 of FOLFOX + Avastin was on 11/23/2015. CEA was 4555 on 11/23/2015. Cycle # 2 of FOLFOX + Avastin was on 12/08/2015. CEA was 3160 on 12/08/2015. Cycle # 3 of FOLFOX + Avastin was on 12/22/2015. CEA was 2516 on 12/21/2015. Cycle # 4 of FOLFOX + Avastin was on 01/05/2016. CEA was 2098 on 01/05/2015. Cycle # 5 of FOLFOX + Avastin was on 01/19/2016. CEA was 1511 on 01/05/2015.     -Bone scan on 01/26/2016 noted no metastatic disease. CT chest on 01/26/2016 revealed Significant response to treatment with no visible residual nodules. CT scan abdomen/pelvis on 01/26/2016 noted Interval decreased size of multiple masses in the liver compatible with treatment response.   Continue another 2 months of FOLFOX + Avastin and repeat scans.  Cycle # 6 of FOLFOX + Avastin was on 02/02/2016.  on 02/02/2016. Cycle # 7 of FOLFOX + Avastin was on 02/16/2016.  on 02/16/2016. Cycle # 8 of FOLFOX + Avastin was on 03/01/2016.  on 03/01/2016. Cycle # 9 of FOLFOX + Avastin was on 03/15/2016.  on 03/15/2016. Cycle # 10 of FOLFOX + Avastin was on 03/29/2016. .4 on 03/29/2016. Cycle # 11 of FOLFOX + Avastin was on 04/12/2016. .4 on 04/12/2016.     Re-staging scans on 04/19/2016: CT Chest: clear lungs; no evidence of recurring pulmonary nodule; CT Abdomen/Pelvis: Further interval decrease in size of the multiple metastatic hepatic lesions, the largest lesion now measures 3.9 x 3.5 cm and previously measured 4.5 cm in maximum diameter. No mesenteric lymphadenopathy is identified; Bone Scan: No evidence of osseous metastasis. Continue same regimen and re-stage in 2-3 months. Cycle # 12 FOLFOX + Avastin was on 04/26/2016. .5 on 04/26/2016. Cycle # 13 FOLFOX + Avastin was on 05/10/2016. .3 on 05/10/2016. Cycle # 14 FOLFOX+AVASTIN was on 05/24/2016. .5 on 05/24/2016. Cycle # 15 FOLFOX + Avastin was on 06/07/2016. CEA 90.5 on 06/07/2016. Admitted to [facility] 06/13/2016-06/16/2016 for abdominal pain: EGD noted 1.5 cm clean based duodenal bulb ulceration s/p epinephrine and bicap per Dr. Bo Burns. No active bleeding. A. Stomach, biopsy: Mild chronic gastritis, immunostain negative for Helicobacter  B. Esophagus, biopsy: Gastric glandular mucosa with prominent ntestinal metaplasia ([place]'s epithelium), negative for epithelial dysplasia, esophageal squamous mucosa not identified     Cycle # 16 FOLFOX (discontinued avastin (bevacizumab) given association of peptic ulcer disease and known association of GI perforation.) was on 06/21/2016. CEA 47 on 06/21/2016. Cycle # 17 FOLFOX was on 07/05/2016. CEA 52.6 on 07/05/2016.  CEA 47.6 on 07/19/2016.     Re-staging scans 07/19/2106: CT Chest negative for metastatic disease. CT Abdomen/Pelvis: Stable hepatic lesions; Question new mural thickening in the cecum. Bone Scan: New Let hip lesion suspicious for bone metastasis.     Increased CEA; new mural thickening in the cecum and new left hip lesion suspicious for bone metastasis are consistent with disease progression; He derived maximum benefit from FOLFOX/AVASTIN. D/C FOLFOX/AVASTIN. We recommended FOLFIRI second line therapy. Cycle # 1 FOLFIRI was on 08/02/2016. CEA 40.8 on 08/02/2016. Xgeva q4 weeks started on 08/02/2016. Cycle # 2 FOLFIRI was on 08/16/2016. CEA 31.7 on 08/16/2016. Cycle # 3 FOLFIRI (added Avastin) was on 08/30/2016 given that ulcers healed on EGD 08/15/2016 by Dr. Amador Burnett; Protonix bid since avastin re-started. Colonoscopy on 08/29/2016 (to look at the cecal area) unremarkable. CEA 26.7 on 08/30/2016. Cycle # 4 FOLFIRI/Avastin was on 09/13/2016. CEA 23.4 on 09/13/2016. Cycle # 5 FOLFIRI/Avastin was on 09/27/2016. CEA 22.3 on 09/27/2016. Cycle # 6 FOLFIRI/Avastin was on 10/11/2016. CEA 18.4 on 10/11/2016.      Bone scan 10/21/2016 stable bone metastasis; ? New lesion anterior left sixth rib likely interval healing fracture. CT abdomen/pelvis revealed stable hepatic metastasis. CT chest negative for metastatic disease. Continue FOLFIRI/Avastin and repeat scans in 3 months. Cycle # 7 FOLFIRI/Avastin was on 10/25/2016. CEA 16.1 on 10/25/2016. Cycle # 8 FOLFIRI/Avastin was on 11/08/2016. CEA 15.7 on 11/08/2016. Cycle # 9 FOLFIRI/Avastin was on 11/29/2016. CEA 13.6 on 11/29/2016. Cycle # 10 FOLFIRI/Avastin was on 12/13/2016. CEA 10.6 on 12/13/2016. Cycle # 11 FOLFIRI/Avastin was on 12/27/2016. CEA 11.1 on 12/27/2016. Cycle # 12 FOLFIRI/Avastin was on 01/10/2017. CEA 9.7 on 01/10/2017. CT chest 01/23/2017 No new pulmonary nodule or lymphadenopathy. Patchy groundglass opacities within the left lower lobe, suggestive of infectious or inflammatory etiology.  Followup CT chest in 3 months. Slight increased linear sclerosis along the left anterior sixth rib corresponding to an area of increased uptake on the prior bone scan from 10/21/2016, favored to be related to interval healing changes of a nondisplaced fracture. CT abdomen/pelvis on 01/23/2017 Redemonstration of multiple hepatic metastases, which are similar compared to the prior CT from 10/21/2016. Redemonstration of area of increased sclerosis along the right acetabulum suspicious for metastatic disease. Bone scan on 01/23/2017 Stable subtle uptake within the left proximal diaphysis, again suggestive of metastatic disease  Decreased subtle uptake within the medial right acetabulum. Decreased uptake within the left anterior sixth rib corresponding to linear sclerosis on the recent CT, favored to be related to an joint rib fracture. Continue FOLFIRI + Avastin and repeat scans in 3 months. Cycle # 13 FOLFIRI + Avastin was on 01/24/2017. Cycle # 14 FOLFIRI + Avastin was on 02/07/2017. Cycle # 15 FOLFIRI + Avastin was on 02/21/2017. Cycle # 16 FOLFIRI + Avastin was on 03/07/2017. Cycle # 17 FOLFIRI + Avastin was on 03/21/2017. Cycle # 18 FOLFIRI + Avastin was on 04/04/2017. CT chest 04/17/2017 negative for metastatic disease. CT abdomen/pelvis 04/17/2017 Stable hypodense metastatic lesions within the liver. Stable area of increased sclerosis along the right acetabulum  Bone scan 04/17/2017 Stable subtle uptake within the left proximal femoral diaphysis; No definite abnormal uptake in the region of a sclerotic lesion along the posterior column of the right acetabulum noted on CT. Stable slight uptake within the left anterior sixth rib corresponding to linear sclerosis on the recent CT, favored to be related to a fracture. Continue FOLFIRI + Avastin and repeat scans in 3 months. Cycle # 19 FOLFIRI + Avastin was on 04/18/2017. CEA 7.5 on 04/18/2017. Cycle # 20 FOLFIRI + Avastin was on 05/02/2017.   CEA 7.7 on 05/02/2017. Cycle # 21 FOLFIRI + Avastin was on 05/16/2017. CEA 7.1 on 05/16/2017. Cycle # 22 FOLFIRI + Avastin was on 05/30/2017. CEA 8.2 on 05/30/2017. Cycle # 23 FOLFIRI + Avastin was on 06/13/2017. CEA 7.7 on 06/13/2017. Cycle # 24 FOLFIRI + Avastin was on 06/27/2017. CEA 6.6 on 06/27/2017. Re-staging scans 07/05/2017:  Bone scan stable proximal left femoral diaphysis, right acetabulum, and left anterior sixth rib lesions;  CT abdomen/pelvis stable hypodense metastatic lesions within the liver; CT chest without convincing evidence of metastatic disease. Cycle # 25 FOLFIRI + Avastin was on 07/11/2017. CEA 6.3 on 07/11/2017. Cycle # 26 FOLFIRI + Avastin was on 07/25/2017. CEA 7.3 on 07/25/2017. Cycle # 27 FOLFIRI + Avastin was on 08/08/2017. CEA 6.5 on 08/08/2017. Cycle # 28 FOLFIRI + Avastin was on 08/22/2017. CEA 5.9 on 08/22/2017. Cycle # 29 FOLFIRI + Avastin was on 09/05/2017. CEA 6.3 on 09/05/2017. Cycle # 30 FOLFIRI + Avastin was on 09/19/2017. CEA 6.3 on 09/19/2017. Bone scan 09/26/2017 stable. CT abdomen/pelvis on 09/26/2017 Stable hypodense mass in the liver. Stable sclerotic lesion in the acetabulum. CT chest 09/26/2017 negative for metastatic disease. Cycle # 31 FOLFIRI + Avastin was on 10/03/2017. CEA 7.4 on 10/03/2017. Cycle # 32 FOLFIRI + Avastin was on 10/17/2017. CEA 6.0 on 10/17/2017. Cycle # 33 FOLFIRI + Avastin was on 10/31/2017. CEA 6.8 on 10/31/2017. Cycle # 34 FOLFIRI + Avastin was on 11/14/2017. CEA 7.2 on 11/14/2017. Cycle # 35 FOLFIRI + Avastin was on 11/28/2017. CEA 5.1 on 11/28/2017. Cycle # 36 FOLFIRI + Avastin was on 12/12/2017. CEA 6.1 on 12/12/2017. Bone scan 12/22/2017 noted no evidence of metastatic disease to the axial or appendicular skeleton. CT chest 12/22/2017 noted no evidence of metastatic disease. CT abdomen/pelvis 12/22/2017 noted stable hypodense lesions in the liver. Continue FOLFIRI + Avastin and repeat scans in 3 months.   Cycle # 37 FOLFIRI + Avastin was on 12/27/2018. CEA 6.7 on 12/27/2017. Cycle # 38 FOLFIRI + Avastin was on 01/09/2018. CEA 5.0 on 01/09/2018. Cycle # 39 FOLFIRI + Avastin was on 01/23/2018. CEA 6.5 on 01/23/2018. Cycle # 40 FOLFIRI + Avastin was on 02/06/2018. CEA 6.9 on 02/06/2018. Cycle # 41 FOLFIRI + Avastin was on 02/20/2018. CEA 6.4 on 02/20/2018. Cycle # 42 FOLFIRI + Avastin was on 03/06/2018. CEA 6.6 on 03/06/2018. Cycle # 43 FOLFIRI + Avastin was on 03/20/2018. CEA 5.7 on 03/20/2018. Bone scan on 03/26/2018 negative for metastatic disease. CT chest 03/26/2018 noted ? new 7 mm nodule in LUCY. CT abdomen/pelvis 03/26/2018 noted stable hypodense lesions in the liver. ? New small ascites in the abdomen. Patient wants to continue to monitor the lung finding and repeat scans in 2 months instead of changing the current chemotherapy regimen to oral Lonsurf. Cycle # 44 FOLFIRI + Avastin was on 04/03/2018. CEA 6.3 on 04/03/2018. Cycle # 45 FOLFIRI + Avastin was on 04/24/2018. CEA 5.3 on 04/24/2018. Cycle # 46 FOLFIRI + Avastin was on 05/08/2018. CEA 5.4 on 05/08/2018. Cycle # 47 FOLFIRI + Avastin was on 05/22/2018. CEA 6.1 on 05/22/2018. CT chest 05/31/2018 noted stable nodule in LUCY. No evidence of worsening malignancy. CT abdomen/pelvis on 05/31/2018 noted stable liver metastasis. No evidence of worsening malignancy. Continue FOLFIRI + Avastin and repeat scans in 3 months. Cycle # 48 FOLFIRI + Avastin was on 06/06/2018. CEA 6.3 on 06/05/2018. Cycle # 49 FOLFIRI + Avastin was on 06/19/2018. CEA 6.1 on 06/19/2018. Cycle # 50 FOLFIRI + Avastin was on 07/03/2018. CEA 7.4 on 07/03/2018. Cycle # 51 FOLFIRI + Avastin was on 07/24/2018. CEA 6.7 on 07/24/2018. Cycle # 52 FOLFIRI + Avastin was on 08/14/2018. CEA 6.8 on 08/14/2018. Cycle # 53 FOLFIRI + Avastin was on 08/28/2018. CEA 6.5 on 08/27/2018.   CT chest 09/06/2018 noted interval decrease in size of LUCY measuring up to 4 mm, suggestive of treatment response. No mediastinal or hilar LN  CT abdomen/pelvis 09/06/2018 Slight interval increase in size of a previously noted metastatic lesion within the right hepatic lobe. This may be related to differences in phase of enhancement compared to the most recent study from 5/31/2018, the lesion remains decreased in size compared to the more previous studies. No abdominal, retroperitoneal, or pelvic lymphadenopathy. Continue FOLFIRI + Avastin and repeat scans in 2 months. Cycle # 54 FOLFIRI + Avastin was on 09/11/2018. CEA 6.4 on 09/10/2018. Cycle # 55 FOLFIRI + Avastin was on 09/25/2018. CEA 6.4 on 09/25/2018. Cycle # 56 FOLFIRI + Avastin was on 10/09/2018. CEA 6.7 on 10/08/2018. Cycle # 57 FOLFIRI + Avastin was on 10/23/2018. CEA 7.2 on 10/22/2018. CT chest 11/02/2018 stable 3 mm nodule within LUCY. No new right and left lung nodule. No mediastinal or osseous lesion. CT abdomen/pelvis 11/02/2018 stable metastatic disease to liver. No new metastatic disease identified. No mesenteric or retroperitoneal LN. No gross colonic lesion identified. Continue FOLFIRI + Avastin and repeat scans in 3 months. Cycle # 58 FOLFIRI + Avastin was on 11/06/2018. CEA 7.6 on 11/05/2018. Cycle # 59 FOLFIRI + Avastin was on 11/27/2018. CEA 6.9 on 11/26/2018. Cycle # 60 FOLFIRI + Avastin was on 12/11/2018. CEA 6.7 on 12/11/2018. Cycle # 61 FOLFIRI + Avastin was on 01/08/2019. CEA 5.6 on 01/07/2019. Cycle # 62 FOLFIRI + Avastin was on 01/29/2019. CEA 4.6 on 01/28/2019. Cycle # 63 FOLFIRI + Avastin was on 02/12/2019. CEA 4.3 on 02/11/2019. CT chest 02/21/2019 no evidence of metastatic disease. CT abdomen/pelvis 02/21/2019 stable hypodense liver lesions. No evidence of worsening metastatic disease. 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 Ordered MRI thoracic spine and referred to neurosurgery team.  Cycle # 64 FOLFIRI + Avastin was on 02/26/2019. CEA 4.7 on 02/25/2019.   Cycle # 65 FOLFIRI + Avastin was on 03/12/2019. CEA 4.0 on 03/11/2019. Cycle # 66 FOLFIRI + Avastin was on 03/26/2019. CEA 4.7 on 03/25/2019. Cycle # 67 FOLFIRI + Avastin was on 04/09/2019. CEA 5.6 on 04/08/2019. Cycle # 68 FOLFIRI + Avastin was on 04/30/2019. CEA 4.9 on 04/29/2019. Cycle # 69 FOLFIRI + Avastin was on 05/14/2019. CEA 5.3 on 05/14/2019. CT chest 05/23/2019 stable small lung nodule with no evidence of metastatic disease. CT abdomen/pelvis 05/23/2019 Decrease conspicuity of the liver lesions. No new lesions seen. Stable splenomegaly. Continue FOLFIRI + Avastin and repeat scans in 3 months. Cycle # 70 FOLFIRI + Avastin was on 06/04/2019. CEA 4.8 on 06/03/2019. Cycle # 71 FOLFIRI + Avastin was on 06/18/2019. CEA 4.6 on 06/17/2019. Cycle # 72 FOLFIRI + Avastin was on 07/09/2019. CEA 4.3 on 07/08/2019. Cycle # 73 FOLFIRI + Avastin was on 07/30/2019. CEA 5.0 on 07/29/2019. Cycle # 74 FOLFIRI + Avastin was on 08/13/2019. CEA 5.0 on 08/12/2019. CT chest 08/20/2019 negative  CT abdomen/pelvis 08/20/2019 Previous identified hepatic lesions are not visualized on the current exam.  Continue FOLFIRI + Avastin and repeat scans in 3 months. Cycle # 75 FOLFIRI + Avastin was on 08/27/2019. CEA 5.0 on 08/26/2019. Cycle # 76 FOLFIRI + Avastin was on 09/17/2019. CEA 5.5 on 09/16/2019. Cycle # 77 FOLFIRI + Avastin was on 10/15/2019. CEA 4.6 on 10/15/2019. Cycle # 78 FOLFIRI + Avastin was on 10/29/2019. CEA 4.1 on 10/28/2019. Cycle # 79 FOLFIRI + Avastin was on 11/12/2019. CEA 4.3 on 11/12/2019. Cycle # 80 FOLFIRI + Avastin was on 12/10/2019. CEA 4.0 on 12/09/2019. CT chest 12/19/2019 Stable 3 mm nodule within the left upper lobe, similar to the previous studies dating back to 11/2/2018, however decreased in size compared to the CT from 3/26/2018. No thoracic LN.    CT abdomen/pelvis 12/19/2019 Previously described small hypodense lesions are more conspicuous on the current study compared to the recent study throughout the liver from 8/20/2019, however similar to the prior study from 2/21/2019. Findings may be related to differences in contrast opacification. No abdominal or pelvic lymphadenopathy. Continue FOLFIRI + Avastin and repeat scans in 2 months to f/u on liver lesions. Cycle # 81 FOLFIRI + Avastin was on 01/07/2020. CEA 3.8 on 01/06/2020. Cycle # 82 FOLFIRI + Avastin was on 01/21/2020. CEA 3.7 on 01/20/2020. Cycle # 83 FOLFIRI + Avastin was on 02/04/2020. CEA 4.1 on 02/03/2020. Cycle # 84 FOLFIRI + Avastin was on 02/18/2020. CEA 4.6 on 02/17/2020. CT chest 2/28/2020 no evidence of metastatic disease to the lungs and no mediastinal or hilar adenopathy. CT abdomen pelvis 2/28/2020 no enhancing lesions seen within the liver to suggest metastatic disease. Wall thickening of the rectosigmoid junction. Images reviewed. Continue FOLFIRI Avastin and repeat scans in 3 months  EGD by Dr. Luis Rodas 03/02/2020 showing no masses or lesions. Cycle # 85 FOLFIRI + Avastin was on 03/03/2020. CEA 7.4 on 03/02/2020. Cycle # 86 FOLFIRI + Avastin was on 03/17/2020. CEA 4.5 on 03/16/2020. Colonoscopy on 03/23/2020 by Dr. Luis aguero (records requested). Cycle # 87 FOLFIRI + Avastin was on 03/31/2020. CEA 4.1 on 03/30/2020. Cycle # 88 FOLFIRI + Avastin was on 04/21/2020. CEA 6.4 on 04/20/2020. Cycle # 89 FOLFIRI + Avastin was on 05/05/2020. CEA 5.8 on 05/04/2020. Cycle # 90 FOLFIRI + Avastin was on 06/02/2020. CEA 5.5 on 06/01/2020. Cycle # 91 FOLFIRI + Avastin was on 06/16/2020. CEA 5.6 on 06/15/2020. CT chest 06/23/2020 noted no metastatic disease in the chest.   CT abdomen/pelvis 06/23/2020 Stable small liver lesions measuring up to 5 mm since 2019.   22 mm round mass in segment 8 of the liver with central high density stable from December 2019), previously measuring up to 34 mm in 2017. No additional metastatic disease in the abdomen or pelvis. Small amount of ascites.   Overall stable disease. Continue FOLFIRI + Avastin and repeat scans in 2-3 months. Cycle # 92 FOLFIRI + Avastin was on 07/07/2020. CEA 5.7 on 07/06/2020. Cycle # 93 FOLFIRI + Avastin was on 07/21/2020. CEA 5.9 on 07/20/2020. Cycle # 94 FOLFIRI + Avastin is today 08/04/2020. CEA 5.5 on 08/04/2020. RTC 2 weeks for Cycle # 95 FOLFIRI + Avastin.  Scans after next visit  MSI testing noted no mismatch repair protein loss of expression    8/4/2020  Cipriano Mckeon MD  Board Certified Medical Oncologist

## 2020-08-06 ENCOUNTER — HOSPITAL ENCOUNTER (OUTPATIENT)
Dept: INFUSION THERAPY | Age: 70
Discharge: HOME | End: 2020-08-06
Payer: MEDICARE

## 2020-08-06 DIAGNOSIS — C20 RECTAL CANCER METASTASIZED TO LIVER (HCC): ICD-10-CM

## 2020-08-06 DIAGNOSIS — C78.7 RECTAL CANCER METASTASIZED TO LIVER (HCC): ICD-10-CM

## 2020-08-06 DIAGNOSIS — Z51.11 ENCOUNTER FOR ANTINEOPLASTIC CHEMOTHERAPY: Primary | ICD-10-CM

## 2020-08-06 PROCEDURE — 96372 THER/PROPH/DIAG INJ SC/IM: CPT

## 2020-08-06 PROCEDURE — 6360000002 HC RX W HCPCS: Performed by: INTERNAL MEDICINE

## 2020-08-06 RX ADMIN — PEGFILGRASTIM 6 MG: 6 INJECTION SUBCUTANEOUS at 14:25

## 2020-08-10 ENCOUNTER — HOSPITAL ENCOUNTER (OUTPATIENT)
Dept: NON INVASIVE DIAGNOSTICS | Age: 70
Discharge: HOME | End: 2020-08-10
Payer: MEDICARE

## 2020-08-10 LAB
LV EF: 63 %
LVEF MODALITY: NORMAL

## 2020-08-10 PROCEDURE — 93306 TTE W/DOPPLER COMPLETE: CPT

## 2020-08-14 ENCOUNTER — TELEPHONE (OUTPATIENT)
Dept: INFUSION THERAPY | Age: 70
End: 2020-08-14

## 2020-08-14 NOTE — TELEPHONE ENCOUNTER
I was informed by Bebeto Guy, [facility], that she processed two payments for patient on 7/21 and 8/4, and the patient called in today stating that neither one of these cleared with his bank. I emailed the receipts over to Dawn Boston to see if she could look into this further for patient.  Electronically signed by Florence Lopez on 8/14/2020 at 10:08 AM

## 2020-08-17 ENCOUNTER — HOSPITAL ENCOUNTER (OUTPATIENT)
Age: 70
Discharge: HOME | End: 2020-08-17
Payer: MEDICARE

## 2020-08-17 ENCOUNTER — TELEPHONE (OUTPATIENT)
Dept: INFUSION THERAPY | Age: 70
End: 2020-08-17

## 2020-08-17 LAB
ALBUMIN SERPL-MCNC: 3.6 G/DL (ref 3.5–5.2)
ALP BLD-CCNC: 137 U/L (ref 40–129)
ALT SERPL-CCNC: 15 U/L (ref 0–40)
ANION GAP SERPL CALCULATED.3IONS-SCNC: 11 MMOL/L (ref 7–16)
ANISOCYTOSIS: ABNORMAL
AST SERPL-CCNC: 22 U/L (ref 0–39)
BASOPHILS ABSOLUTE: 0.01 E9/L (ref 0–0.2)
BASOPHILS RELATIVE PERCENT: 0.9 % (ref 0–2)
BILIRUB SERPL-MCNC: 1 MG/DL (ref 0–1.2)
BUN BLDV-MCNC: 15 MG/DL (ref 8–23)
CALCIUM SERPL-MCNC: 9 MG/DL (ref 8.6–10.2)
CEA: 5.5 NG/ML (ref 0–5.2)
CHLORIDE BLD-SCNC: 107 MMOL/L (ref 98–107)
CHOLESTEROL, FASTING: 106 MG/DL (ref 0–199)
CO2: 24 MMOL/L (ref 22–29)
CREAT SERPL-MCNC: 1.1 MG/DL (ref 0.7–1.2)
EOSINOPHILS ABSOLUTE: 0.04 E9/L (ref 0.05–0.5)
EOSINOPHILS RELATIVE PERCENT: 3.5 % (ref 0–6)
GFR AFRICAN AMERICAN: >60
GFR NON-AFRICAN AMERICAN: >60 ML/MIN/1.73
GLUCOSE BLD-MCNC: 102 MG/DL (ref 74–99)
HCT VFR BLD CALC: 23 % (ref 37–54)
HDLC SERPL-MCNC: 77 MG/DL
HEMOGLOBIN: 7.1 G/DL (ref 12.5–16.5)
LDL CHOLESTEROL CALCULATED: 19 MG/DL (ref 0–99)
LYMPHOCYTES ABSOLUTE: 0.3 E9/L (ref 1.5–4)
LYMPHOCYTES RELATIVE PERCENT: 29.8 % (ref 20–42)
MCH RBC QN AUTO: 32.4 PG (ref 26–35)
MCHC RBC AUTO-ENTMCNC: 30.9 % (ref 32–34.5)
MCV RBC AUTO: 105 FL (ref 80–99.9)
MONOCYTES ABSOLUTE: 0.08 E9/L (ref 0.1–0.95)
MONOCYTES RELATIVE PERCENT: 7.9 % (ref 2–12)
NEUTROPHILS ABSOLUTE: 0.58 E9/L (ref 1.8–7.3)
NEUTROPHILS RELATIVE PERCENT: 57.9 % (ref 43–80)
NUCLEATED RED BLOOD CELLS: 0.9 /100 WBC
OVALOCYTES: ABNORMAL
PDW BLD-RTO: 19 FL (ref 11.5–15)
PLATELET # BLD: 71 E9/L (ref 130–450)
PLATELET CONFIRMATION: NORMAL
PMV BLD AUTO: 10.6 FL (ref 7–12)
POIKILOCYTES: ABNORMAL
POLYCHROMASIA: ABNORMAL
POTASSIUM SERPL-SCNC: 3.9 MMOL/L (ref 3.5–5)
RBC # BLD: 2.19 E12/L (ref 3.8–5.8)
SODIUM BLD-SCNC: 142 MMOL/L (ref 132–146)
TEAR DROP CELLS: ABNORMAL
TOTAL PROTEIN: 6.1 G/DL (ref 6.4–8.3)
TOXIC GRANULATION: ABNORMAL
TRIGLYCERIDE, FASTING: 49 MG/DL (ref 0–149)
VLDLC SERPL CALC-MCNC: 10 MG/DL
WBC # BLD: 1 E9/L (ref 4.5–11.5)

## 2020-08-17 PROCEDURE — 80053 COMPREHEN METABOLIC PANEL: CPT

## 2020-08-17 PROCEDURE — 85025 COMPLETE CBC W/AUTO DIFF WBC: CPT

## 2020-08-17 PROCEDURE — 36415 COLL VENOUS BLD VENIPUNCTURE: CPT

## 2020-08-17 PROCEDURE — 80061 LIPID PANEL: CPT

## 2020-08-17 PROCEDURE — 82378 CARCINOEMBRYONIC ANTIGEN: CPT

## 2020-08-17 NOTE — TELEPHONE ENCOUNTER
Called [facility] [phone], spoke to Nati, updated her that per Abbie Costa CNP, \"Patient will not be having his home pump as his labs are out of perimeters. \" She voiced understanding. SUBJECTIVE:    Patient is a 74y old Male who presents with a chief complaint of neutropenic fever (21 Mar 2018 22:16)    Currently admitted to medicine with the primary diagnosis of Neutropenic fever     Today is hospital day 5d. This morning he is resting in bed.     PAST MEDICAL & SURGICAL HISTORY  Pituitary tumor: benign  CHF (congestive heart failure)  HTN (hypertension)  Afib  CAD (coronary artery disease)  AML (acute myeloblastic leukemia)  H/O total knee replacement, left  History of cardiac radiofrequency ablation    SOCIAL HISTORY:  Negative for smoking/alcohol/drug use.     ALLERGIES:  No Known Allergies    MEDICATIONS:  STANDING MEDICATIONS  acyclovir   Tablet 400 milliGRAM(s) Oral two times a day  atorvastatin 40 milliGRAM(s) Oral at bedtime  cabergoline 0.5 milliGRAM(s) Oral daily  cholecalciferol 1000 Unit(s) Oral daily  diltiazem    milliGRAM(s) Oral daily  docusate sodium 100 milliGRAM(s) Oral two times a day  enoxaparin Injectable 40 milliGRAM(s) SubCutaneous every 24 hours  furosemide    Tablet 20 milliGRAM(s) Oral daily  meropenem  IVPB 1000 milliGRAM(s) IV Intermittent every 8 hours  pantoprazole    Tablet 40 milliGRAM(s) Oral before breakfast  posaconazole DR Tablet 300 milliGRAM(s) Oral daily  potassium chloride   Powder 40 milliEquivalent(s) Oral once  sacubitril 49 mG/valsartan 51 mG 1 Tablet(s) Oral every 12 hours  SORAfenib 200 milliGRAM(s) Oral two times a day  vancomycin  IVPB 2000 milliGRAM(s) IV Intermittent once    PRN MEDICATIONS  acetaminophen   Tablet 650 milliGRAM(s) Oral every 6 hours PRN  zolpidem 5 milliGRAM(s) Oral at bedtime PRN    VITALS:   T(F): 99.1  HR: 89  BP: 150/74  RR: 18  SpO2: 94%    LABS:                        7.3    0.61  )-----------( 180      ( 26 Mar 2018 09:37 )             22.1     03-26    139  |  103  |  13  ----------------------------<  141<H>  3.1<L>   |  26  |  0.5<L>    Ca    7.7<L>      26 Mar 2018 09:37  Mg     1.8     03-26    TPro  4.9<L>  /  Alb  2.8<L>  /  TBili  0.5  /  DBili  x   /  AST  19  /  ALT  25  /  AlkPhos  57  03-26      Culture - Blood (collected 25 Mar 2018 07:54)  Source: .Blood None  Preliminary Report (26 Mar 2018 15:00):    No growth to date.      RADIOLOGY:    PHYSICAL EXAM:  GEN: No acute distress  LUNGS: Clear to auscultation bilaterally   HEART: S1/S2 present. RRR.   ABD: Soft, non-tender, non-distended. Bowel sounds present  EXT: NC/NC/NE/2+PP/HAYES  NEURO: AAOX3 SUBJECTIVE:    Patient is a 74y old Male who presents with a chief complaint of neutropenic fever (21 Mar 2018 22:16)    Currently admitted to medicine with the primary diagnosis of Neutropenic fever     Today is hospital day 5d. This morning he is resting in bed. His wife is at bedside. They are upset regarding multiple issues including their current accommodations. They have requested that someone reach out again to their oncologist at [facility].      PAST MEDICAL & SURGICAL HISTORY  Pituitary tumor: benign  CHF (congestive heart failure)  HTN (hypertension)  Afib  CAD (coronary artery disease)  AML (acute myeloblastic leukemia)  H/O total knee replacement, left  History of cardiac radiofrequency ablation    SOCIAL HISTORY:  Negative for smoking/alcohol/drug use.     ALLERGIES:  No Known Allergies    MEDICATIONS:  STANDING MEDICATIONS  acyclovir   Tablet 400 milliGRAM(s) Oral two times a day  atorvastatin 40 milliGRAM(s) Oral at bedtime  cabergoline 0.5 milliGRAM(s) Oral daily  cholecalciferol 1000 Unit(s) Oral daily  diltiazem    milliGRAM(s) Oral daily  docusate sodium 100 milliGRAM(s) Oral two times a day  enoxaparin Injectable 40 milliGRAM(s) SubCutaneous every 24 hours  furosemide    Tablet 20 milliGRAM(s) Oral daily  meropenem  IVPB 1000 milliGRAM(s) IV Intermittent every 8 hours  pantoprazole    Tablet 40 milliGRAM(s) Oral before breakfast  posaconazole DR Tablet 300 milliGRAM(s) Oral daily  potassium chloride   Powder 40 milliEquivalent(s) Oral once  sacubitril 49 mG/valsartan 51 mG 1 Tablet(s) Oral every 12 hours  SORAfenib 200 milliGRAM(s) Oral two times a day  vancomycin  IVPB 2000 milliGRAM(s) IV Intermittent once    PRN MEDICATIONS  acetaminophen   Tablet 650 milliGRAM(s) Oral every 6 hours PRN  zolpidem 5 milliGRAM(s) Oral at bedtime PRN    VITALS:   T(F): 99.1  HR: 89  BP: 150/74  RR: 18  SpO2: 94%    LABS:                        7.3    0.61  )-----------( 180      ( 26 Mar 2018 09:37 )             22.1     03-26    139  |  103  |  13  ----------------------------<  141<H>  3.1<L>   |  26  |  0.5<L>    Ca    7.7<L>      26 Mar 2018 09:37  Mg     1.8     03-26    TPro  4.9<L>  /  Alb  2.8<L>  /  TBili  0.5  /  DBili  x   /  AST  19  /  ALT  25  /  AlkPhos  57  03-26      Culture - Blood (collected 25 Mar 2018 07:54)  Source: .Blood None  Preliminary Report (26 Mar 2018 15:00):    No growth to date.      RADIOLOGY:    PHYSICAL EXAM:  GEN: No acute distress, but appears fatigued   LUNGS: Clear to auscultation bilaterally   HEART: S1/S2 present. RRR.   ABD: Soft, non-tender, non-distended. Bowel sounds present  EXT: NC/NC/NE/2+PP/HAYES  NEURO: AAOX3

## 2020-08-18 ENCOUNTER — HOSPITAL ENCOUNTER (OUTPATIENT)
Dept: INFUSION THERAPY | Age: 70
Discharge: HOME | End: 2020-08-18
Payer: MEDICARE

## 2020-08-18 ENCOUNTER — OFFICE VISIT (OUTPATIENT)
Dept: ONCOLOGY | Age: 70
End: 2020-08-18
Payer: MEDICARE

## 2020-08-18 ENCOUNTER — TELEPHONE (OUTPATIENT)
Dept: INFUSION THERAPY | Age: 70
End: 2020-08-18

## 2020-08-18 VITALS
TEMPERATURE: 97.7 F | BODY MASS INDEX: 24.37 KG/M2 | DIASTOLIC BLOOD PRESSURE: 68 MMHG | WEIGHT: 183.9 LBS | HEART RATE: 65 BPM | SYSTOLIC BLOOD PRESSURE: 121 MMHG | HEIGHT: 73 IN

## 2020-08-18 PROCEDURE — 99212 OFFICE O/P EST SF 10 MIN: CPT

## 2020-08-18 PROCEDURE — 99214 OFFICE O/P EST MOD 30 MIN: CPT | Performed by: INTERNAL MEDICINE

## 2020-08-18 NOTE — PROGRESS NOTES
detected. 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     CEA 3488. Bone scan on 11/10/2015 noted no metastatic disease. CT chest on 11/10/2015 revealed Multiple pulmonary nodules in the upper and lower lobes consistent with metastatic disease;   Hepatic metastasis also visualized. 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 Mediport was placed. Cycle # 1 of FOLFOX + Avastin was on 11/23/2015. CEA was 4555 on 11/23/2015. Cycle # 2 of FOLFOX + Avastin was on 12/08/2015. CEA was 3160 on 12/08/2015. Cycle # 3 of FOLFOX + Avastin was on 12/22/2015. CEA was 2516 on 12/21/2015. Cycle # 4 of FOLFOX + Avastin was on 01/05/2016. CEA was 2098 on 01/05/2015. Cycle # 5 of FOLFOX + Avastin was on 01/19/2016. CEA was 1511 on 01/05/2015.     -Bone scan on 01/26/2016 noted no metastatic disease. CT chest on 01/26/2016 revealed Significant response to treatment with no visible residual nodules. CT scan abdomen/pelvis on 01/26/2016 noted Interval decreased size of multiple masses in the liver compatible with treatment response. Continue another 2 months of FOLFOX + Avastin and repeat scans. Cycle # 6 of FOLFOX + Avastin was on 02/02/2016.  on 02/02/2016. Cycle # 7 of FOLFOX + Avastin was on 02/16/2016.  on 02/16/2016. Cycle # 8 of FOLFOX + Avastin was on 03/01/2016.  on 03/01/2016. Cycle # 9 of FOLFOX + Avastin was on 03/15/2016.  on 03/15/2016. Cycle # 10 of FOLFOX + Avastin was on 03/29/2016. .4 on 03/29/2016. Cycle # 11 of FOLFOX + Avastin was on 04/12/2016. .4 on 04/12/2016.     Re-staging scans on 04/19/2016: CT Chest: clear lungs; no evidence of recurring pulmonary nodule; CT Abdomen/Pelvis: Further interval decrease in size of the multiple metastatic hepatic lesions, the largest lesion now measures 3.9 x 3.5 cm and previously measured 4.5 cm in maximum diameter.  No mesenteric lymphadenopathy is identified; Bone Scan: 08/29/2016 (to look at the cecal area) unremarkable. CEA 26.7 on 08/30/2016. Cycle # 4 FOLFIRI/Avastin was on 09/13/2016. CEA 23.4 on 09/13/2016. Cycle # 5 FOLFIRI/Avastin was on 09/27/2016. CEA 22.3 on 09/27/2016. Cycle # 6 FOLFIRI/Avastin was on 10/11/2016. CEA 18.4 on 10/11/2016.      Bone scan 10/21/2016 stable bone metastasis; ? New lesion anterior left sixth rib likely interval healing fracture. CT abdomen/pelvis revealed stable hepatic metastasis. CT chest negative for metastatic disease. Continue FOLFIRI/Avastin and repeat scans in 3 months. Cycle # 7 FOLFIRI/Avastin was on 10/25/2016. CEA 16.1  Cycle # 8 FOLFIRI/Avastin was on 11/08/2016. CEA 15.7  Cycle # 9 FOLFIRI/Avastin was on 11/29/2016. CEA 13.6 on 11/29/2016. Cycle # 10 FOLFIRI/Avastin was on 12/13/2016. CEA 10.6 on 12/13/2016. Cycle # 11 FOLFIRI/Avastin was on 12/27/2016. CEA 11.1 on 12/27/2016. Cycle # 12 FOLFIRI/Avastin was on 01/10/2017. CEA 9.7 on 01/10/2017.       CT chest 01/23/2017 No new pulmonary nodule or lymphadenopathy. Patchy groundglass opacities within the left lower lobe, suggestive of infectious or inflammatory etiology. Followup CT chest in 3 months. Slight increased linear sclerosis along the left anterior sixth rib corresponding to an area of increased uptake on the prior bone scan from 10/21/2016, favored to be related to interval healing changes of a nondisplaced fracture. CT abdomen/pelvis on 01/23/2017 Redemonstration of multiple hepatic metastases, which are similar compared to the prior CT from 10/21/2016. Redemonstration of area of increased sclerosis along the right acetabulum suspicious for metastatic disease. Bone scan on 01/23/2017 Stable subtle uptake within the left proximal diaphysis, again suggestive of metastatic disease  Decreased subtle uptake within the medial right acetabulum.    Decreased uptake within the left anterior sixth rib corresponding to linear sclerosis on the recent CT, favored to be related to an joint rib fracture. Continue FOLFIRI + Avastin and repeat scans in 3 months. Cycle # 13 FOLFIRI + Avastin was on 01/24/2017. Cycle # 14 FOLFIRI + Avastin was on 02/07/2017. Cycle # 15 FOLFIRI + Avastin was on 02/21/2017. Cycle # 16 FOLFIRI + Avastin was on 03/07/2017. Cycle # 17 FOLFIRI + Avastin was on 03/21/2017. Cycle # 18 FOLFIRI + Avastin was on 04/04/2017. CT chest 04/17/2017 negative for metastatic disease. CT abdomen/pelvis 04/17/2017 Stable hypodense metastatic lesions within the liver. Stable area of increased sclerosis along the right acetabulum  Bone scan 04/17/2017 Stable subtle uptake within the left proximal femoral diaphysis; No definite abnormal uptake in the region of a sclerotic lesion along the posterior column of the right acetabulum noted on CT. Stable slight uptake within the left anterior sixth rib corresponding to linear sclerosis on the recent CT, favored to be related to a fracture. Continue FOLFIRI + Avastin and repeat scans in 3 months. Cycle # 19 FOLFIRI + Avastin was on 04/18/2017. Cycle # 20 FOLFIRI + Avastin was on 05/02/2017. Cycle # 21 FOLFIRI + Avastin was on 05/16/2017. Cycle # 22 FOLFIRI + Avastin was on 05/30/2017. Cycle # 23 FOLFIRI + Avastin was on 06/13/2017. Cycle # 24 FOLFIRI + Avastin was on 06/27/2017. Cycle # 25 FOLFIRI + Avastin was on 07/11/2017. Cycle # 26 FOLFIRI + Avastin was on 07/25/2017. Cycle # 27 FOLFIRI + Avastin was on 08/08/2017. Cycle # 28 FOLFIRI + Avastin was on 08/22/2017. Cycle # 29 FOLFIRI + Avastin was on 09/05/2017. Cycle # 30 FOLFIRI + Avastin was on 09/19/2017. Bone scan 09/26/2017 stable. CT abdomen/pelvis on 09/26/2017 Stable hypodense mass in the liver. Stable sclerotic lesion in the acetabulum. CT chest 09/26/2017 negative for metastatic disease. Cycle # 31 FOLFIRI + Avastin was on 10/03/2017. CEA 7.4 on 10/03/2017. Cycle # 32 FOLFIRI + Avastin was on 10/17/2017.  CEA 6.0 on 10/17/2017. Cycle # 33 FOLFIRI + Avastin was on 10/31/2017. CEA 6.8 on 10/31/2017. Cycle # 34 FOLFIRI + Avastin was on 11/14/2017. CEA 7.2 on 11/14/2017. Cycle # 35 FOLFIRI + Avastin was on 11/28/2017. CEA 5.1 on 11/28/2017. Cycle # 36 FOLFIRI + Avastin was on 12/12/2017. CEA 6.1 on 12/12/2017. Bone scan 12/22/2017 noted no evidence of metastatic disease to the axial or appendicular skeleton. CT chest 12/22/2017 noted no evidence of metastatic disease. CT abdomen/pelvis 12/22/2017 noted stable hypodense lesions in the liver. Continue FOLFIRI + Avastin and repeat scans in 3 months. Cycle # 37 FOLFIRI + Avastin was on 12/27/2018. Cycle # 38 FOLFIRI + Avastin was on 01/09/2018. Cycle # 39 FOLFIRI + Avastin was on 01/23/2018. Cycle # 40 FOLFIRI + Avastin was on 02/06/2018. Cycle # 41 FOLFIRI + Avastin was on 02/20/2018. Cycle # 42 FOLFIRI + Avastin was on 03/06/2018. Cycle # 43 FOLFIRI + Avastin was on 03/20/2018. Bone scan on 03/26/2018 negative for metastatic disease. CT chest 03/26/2018 noted ? new 7 mm nodule in LUCY. CT abdomen/pelvis 03/26/2018 noted stable hypodense lesions in the liver. ? New small ascites in the abdomen. Patient wants to continue to monitor the lung finding and repeat scans in 2 months instead of changing the current regimen into oral Lonsurf. Cycle # 44 FOLFIRI + Avastin was on 04/03/2018. CEA 6.3 on 04/03/2018. Cycle # 45 FOLFIRI + Avastin was on 04/24/2018. CEA 5.3 on 04/24/2018. Cycle # 46 FOLFIRI + Avastin was on 05/08/2018. CEA 5.4 on 05/08/2018. Cycle # 47 FOLFIRI + Avastin was on 05/22/2018. CEA 6.1 on 05/22/2018. CT chest 05/31/2018 noted stable nodule in LUCY. No evidence of worsening malignancy. CT abdomen/pelvis on 05/31/2018 noted stable liver metastasis. No evidence of worsening malignancy. Continue FOLFIRI + Avastin and repeat scans in 3 months. Cycle # 48 FOLFIRI + Avastin was on 06/06/2018. Cycle # 49 FOLFIRI + Avastin was on 06/19/2018. Cycle # 50 FOLFIRI + Avastin was on 07/03/2018. Cycle # 51 FOLFIRI + Avastin was on 07/24/2018. Cycle # 52 FOLFIRI + Avastin was on 08/14/2018. Cycle # 53 FOLFIRI + Avastin was on 08/28/2018. CT chest 09/06/2018 noted interval decrease in size of LUCY measuring up to 4 mm, suggestive of treatment response. No mediastinal or hilar LN  CT abdomen/pelvis 09/06/2018 Slight interval increase in size of a previously noted metastatic lesion within the right hepatic lobe. This may be related to differences in phase of enhancement compared to the most recent study from 5/31/2018, the lesion remains decreased in size compared to the more previous studies. No abdominal, retroperitoneal, or pelvic lymphadenopathy. Continue FOLFIRI + Avastin and repeat scans in 2 months. Cycle # 54 FOLFIRI + Avastin was on 09/11/2018. Cycle # 55 FOLFIRI + Avastin was on 09/25/2018. Cycle # 56 FOLFIRI + Avastin was on 10/09/2018. Cycle # 57 FOLFIRI + Avastin was on 10/23/2018. CT chest 11/02/2018 stable 3 mm nodule within LUCY. No new right and left lung nodule. No mediastinal or osseous lesion. CT abdomen/pelvis 11/02/2018 stable metastatic disease to liver. No new metastatic disease identified. No mesenteric or retroperitoneal LN. No gross colonic lesion identified. Continue FOLFIRI + Avastin and repeat scans in 3 months. Cycle # 58 FOLFIRI + Avastin was on 11/06/2018. CEA 7.6 on 11/05/2018. Cycle # 59 FOLFIRI + Avastin was on 11/27/2018. CEA 6.9 on 11/26/2018. Cycle # 60 FOLFIRI + Avastin was on 12/11/2018. CEA 6.7 on 12/11/2018. Cycle # 61 FOLFIRI + Avastin was on 01/08/2019. CEA 5.6 on 01/07/2019. Cycle # 62 FOLFIRI + Avastin was on 01/29/2019. CEA 4.6 on 01/28/2019. Cycle # 63 FOLFIRI + Avastin was on 02/12/2019. CEA 4.3 on 02/11/2019. CT chest 02/21/2019 no evidence of metastatic disease. CT abdomen/pelvis 02/21/2019 stable hypodense liver lesions. No evidence of worsening metastatic disease.  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 Ordered MRI thoracic spine and referred to neurosurgery team.    Cycle # 64 FOLFIRI + Avastin was on 02/26/2019. CEA 4.7 on 02/25/2019. Cycle # 65 FOLFIRI + Avastin was on 03/12/2019. CEA 4.0 on 03/11/2019. Cycle # 66 FOLFIRI + Avastin was on 03/26/2019. CEA 4.7 on 03/25/2019. Cycle # 67 FOLFIRI + Avastin was on 04/09/2019. CEA 5.6 on 04/08/2019. Cycle # 68 FOLFIRI + Avastin was on 04/30/2019. CEA 4.9 on 04/29/2019. Cycle # 69 FOLFIRI + Avastin was on 05/14/2019. CEA 5.3 on 05/14/2019. CT chest 05/23/2019 stable small lung nodule with no evidence of metastatic disease. CT abdomen/pelvis 05/23/2019 Decrease conspicuity of the liver lesions. No new lesions seen. Stable splenomegaly. Continue FOLFIRI + Avastin and repeat scans in 3 months. Cycle # 70 FOLFIRI + Avastin was on 06/04/2019. CEA 4.8 on 06/03/2019. Cycle # 71 FOLFIRI + Avastin was on 06/18/2019. CEA 4.6 on 06/17/2019. Cycle # 72 FOLFIRI + Avastin was on 07/09/2019. CEA 4.3 on 07/08/2019. Cycle # 73 FOLFIRI + Avastin was on 07/30/2019. CEA 5.0 on 07/29/2019. Cycle # 74 FOLFIRI + Avastin was on 08/13/2019. CEA 5.0 on 08/12/2019. CT chest 08/20/2019 negative  CT abdomen/pelvis 08/20/2019 Previous identified hepatic lesions are not visualized on the current exam.  Continue FOLFIRI + Avastin and repeat scans in 3 months. Cycle # 75 FOLFIRI + Avastin was on 08/27/2019. CEA 5.0 on 08/26/2019. Cycle # 76 FOLFIRI + Avastin was on 09/17/2019. CEA 5.5 on 09/16/2019. Cycle # 77 FOLFIRI + Avastin was on 10/15/2019. CEA 4.6 on 10/15/2019. Cycle # 78 FOLFIRI + Avastin was on 10/29/2019. CEA 4.1 on 10/28/2019. Cycle # 79 FOLFIRI + Avastin was on 11/12/2019. CEA 4.3 on 11/12/2019. Cycle # 80 FOLFIRI + Avastin was on 12/10/2019. CEA 4.0 on 12/09/2019.   CT chest 12/19/2019 Stable 3 mm nodule within the left upper lobe, similar to the previous studies dating back to 11/2/2018, however decreased in size compared to the CT from 3/26/2018. No thoracic LN. CT abdomen/pelvis 12/19/2019 Previously described small hypodense lesions are more conspicuous on the current study compared to the recent study throughout the liver from 8/20/2019, however similar to the prior study from 2/21/2019. Findings may be related to differences in contrast opacification. No abdominal or pelvic lymphadenopathy. Continue FOLFIRI + Avastin and repeat scans in 2 months to f/u on liver lesions. Cycle # 81 FOLFIRI + Avastin was on 01/07/2020. CEA 3.8 on 01/06/2020. Cycle # 82 FOLFIRI + Avastin was on 01/21/2020. CEA 3.7 on 01/20/2020. Cycle # 83 FOLFIRI + Avastin was on 02/04/2020. CEA 4.1 on 02/03/2020. Cycle # 84 FOLFIRI + Avastin was on 02/18/2020. CEA 4.6 on 02/17/2020. EGD by Dr. Mary Villanueva 03/02/2020 showing no masses or lesions  Cycle # 85 FOLFIRI + Avastin was on 03/03/2020. CEA 7.4 on 03/02/2020. Cycle # 86 FOLFIRI + Avastin was on 03/17/2020. CEA 4.5 on 03/16/2020. Colonoscopy on 03/23/2020 by Dr. Mary Villanueva unremarkable (records requested). Cycle # 87 FOLFIRI + Avastin was on 03/31/2020. CEA 4.1 on 03/30/2020. Cycle # 88 FOLFIRI + Avastin was on 04/21/2020. CEA 6.4 on 04/20/2020. Cycle # 89 FOLFIRI + Avastin was on 05/05/2020. CEA 5.8 on 05/04/2020. Cycle # 90 FOLFIRI + Avastin was on 06/02/2020. CEA 5.5 on 06/01/2020. Cycle # 91 FOLFIRI + Avastin was on 06/16/2020. CEA 5.6 on 06/15/2020. CT chest 06/23/2020 noted no metastatic disease in the chest.   CT abdomen/pelvis 06/23/2020 Stable small liver lesions measuring up to 5 mm since 2019.   22 mm round mass in segment 8 of the liver with central high density stable from December 2019), previously measuring up to 34 mm in 2017. No additional metastatic disease in the abdomen or pelvis. Small amount of ascites. Overall stable disease. Continue FOLFIRI + Avastin and repeat scans in 2-3 months. Cycle # 92 FOLFIRI + Avastin was on 07/07/2020.  CEA 5.7 on 07/06/2020. Cycle # 93 FOLFIRI + Avastin was on 07/21/2020. CEA 5.9 on 07/20/2020. Cycle # 94 FOLFIRI + Avastin was on 08/04/2020. CEA 5.5 on 08/04/2020. He presented today 08/18/2020 Cycle # 95 FOLFIRI + Avastin. No fever, chills. No chest pain or SOB. No nausea or vomiting. No diarrhea. Fatigue    Review of Systems;  CONSTITUTIONAL: No fever, chills. Fair appetite. Fatigue. ENMT: Eyes: No diplopia; Nose: No epistaxis. Mouth:No lesions  RESPIRATORY: No hemoptysis, shortness of breath. CARDIOVASCULAR: No chest pain, palpitations. GASTROINTESTINAL: No nausea/vomiting, abdominal pain. GENITOURINARY: No dysuria, urinary frequency, hematuria. NEURO: No syncope, presyncope, headache. Remainder ROS: Negative. Past Medical History:   Past Medical History               Diagnosis Date    Arthritis      Cancer ([facility] [address]         colon    Depression      Hyperlipidemia      Hypertension           Medications:  Reviewed and reconciled.     Allergies: Allergies   Allergen Reactions    Neosporin [Neomycin-Polymyxin-Gramicidin] Rash    Tape [place] Tape] Rash      Physical Exam:  /68 (Site: Left Upper Arm, Position: Sitting, Cuff Size: Medium Adult)   Pulse 65   Temp 97.7 °F (36.5 °C) (Temporal)   Ht 6' 1\" (1.854 m)   Wt 183 lb 14.4 oz (83.4 kg)   BMI 24.26 kg/m²   GENERAL: Alert, oriented x 3, not in acute distress  HEENT: PERRLA, EOMI. No oral lesions. NECK: Supple. Without lymphadenopathy. LUNGS: Lungs are CTA bilaterally, with no wheezing, crackles or rhonchi. CARDIOVASCULAR: Regular rhythm. No murmurs, rubs or gallops. ABDOMEN: Soft. Non-tender, non-distended. Positive bowel sounds. EXTREMITIES: Without clubbing, cyanosis or edema. NEUROLOGIC: No focal deficits.    ECOG PS 1    Diagnostics:  Lab Results   Component Value Date    WBC 1.0 (L) 08/17/2020    HGB 7.1 (L) 08/17/2020    HCT 23.0 (L) 08/17/2020    .0 (H) 08/17/2020    PLT 71 (L) 08/17/2020     Lab Results Component Value Date     08/17/2020    K 3.9 08/17/2020     08/17/2020    CO2 24 08/17/2020    BUN 15 08/17/2020    CREATININE 1.1 08/17/2020    GLUCOSE 102 (H) 08/17/2020    CALCIUM 9.0 08/17/2020    PROT 6.1 (L) 08/17/2020    LABALBU 3.6 08/17/2020    BILITOT 1.0 08/17/2020    ALKPHOS 137 (H) 08/17/2020    AST 22 08/17/2020    ALT 15 08/17/2020    LABGLOM >60 08/17/2020    GFRAA >60 08/17/2020     Lab Results   Component Value Date    CEA 5.5 (H) 08/17/2020      Impression/Plan:  70 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 KRAS Mutation: Mutation detected. BRAF Mutation: Mutation not detected. 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 CT scan abdomen/pelvis on 10/26/2015 revealed small nodules at the lung bases, extensive liver lesions consistent with metastatic rectosigmoid cancer. CEA 3488 on 10/30/2015. Bone scan on 11/10/2015 noted no metastatic disease. CT chest on 11/10/2015 revealed Multiple pulmonary nodules in the upper and lower lobes consistent with metastatic disease; Hepatic metastasis also visualized. 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 Mediport was placed. Cycle # 1 of FOLFOX + Avastin was on 11/23/2015. CEA was 4555 on 11/23/2015. Cycle # 2 of FOLFOX + Avastin was on 12/08/2015. CEA was 3160 on 12/08/2015. Cycle # 3 of FOLFOX + Avastin was on 12/22/2015. CEA was 2516 on 12/21/2015. Cycle # 4 of FOLFOX + Avastin was on 01/05/2016. CEA was 2098 on 01/05/2015. Cycle # 5 of FOLFOX + Avastin was on 01/19/2016. CEA was 1511 on 01/05/2015.     -Bone scan on 01/26/2016 noted no metastatic disease. CT chest on 01/26/2016 revealed Significant response to treatment with no visible residual nodules. CT scan abdomen/pelvis on 01/26/2016 noted Interval decreased size of multiple masses in the liver compatible with treatment response.   Continue another 2 months of FOLFOX + Avastin and repeat scans. Cycle # 6 of FOLFOX + Avastin was on 02/02/2016.  on 02/02/2016. Cycle # 7 of FOLFOX + Avastin was on 02/16/2016.  on 02/16/2016. Cycle # 8 of FOLFOX + Avastin was on 03/01/2016.  on 03/01/2016. Cycle # 9 of FOLFOX + Avastin was on 03/15/2016.  on 03/15/2016. Cycle # 10 of FOLFOX + Avastin was on 03/29/2016. .4 on 03/29/2016. Cycle # 11 of FOLFOX + Avastin was on 04/12/2016. .4 on 04/12/2016.     Re-staging scans on 04/19/2016: CT Chest: clear lungs; no evidence of recurring pulmonary nodule; CT Abdomen/Pelvis: Further interval decrease in size of the multiple metastatic hepatic lesions, the largest lesion now measures 3.9 x 3.5 cm and previously measured 4.5 cm in maximum diameter. No mesenteric lymphadenopathy is identified; Bone Scan: No evidence of osseous metastasis. Continue same regimen and re-stage in 2-3 months. Cycle # 12 FOLFOX + Avastin was on 04/26/2016. .5 on 04/26/2016. Cycle # 13 FOLFOX + Avastin was on 05/10/2016. .3 on 05/10/2016. Cycle # 14 FOLFOX+AVASTIN was on 05/24/2016. .5 on 05/24/2016. Cycle # 15 FOLFOX + Avastin was on 06/07/2016. CEA 90.5 on 06/07/2016. Admitted to [facility] 06/13/2016-06/16/2016 for abdominal pain: EGD noted 1.5 cm clean based duodenal bulb ulceration s/p epinephrine and bicap per Dr. Jaqueline Gold. No active bleeding. A. Stomach, biopsy: Mild chronic gastritis, immunostain negative for Helicobacter  B. Esophagus, biopsy: Gastric glandular mucosa with prominent ntestinal metaplasia ([place]'s epithelium), negative for epithelial dysplasia, esophageal squamous mucosa not identified     Cycle # 16 FOLFOX (discontinued avastin (bevacizumab) given association of peptic ulcer disease and known association of GI perforation.) was on 06/21/2016. CEA 47 on 06/21/2016. Cycle # 17 FOLFOX was on 07/05/2016. CEA 52.6 on 07/05/2016.  CEA 47.6 on 07/19/2016.     Re-staging scans 07/19/2106: CT Chest negative for metastatic disease. CT Abdomen/Pelvis: Stable hepatic lesions; Question new mural thickening in the cecum. Bone Scan: New Let hip lesion suspicious for bone metastasis.     Increased CEA; new mural thickening in the cecum and new left hip lesion suspicious for bone metastasis are consistent with disease progression; He derived maximum benefit from FOLFOX/AVASTIN. D/C FOLFOX/AVASTIN. We recommended FOLFIRI second line therapy. Cycle # 1 FOLFIRI was on 08/02/2016. CEA 40.8 on 08/02/2016. Xgeva q4 weeks started on 08/02/2016. Cycle # 2 FOLFIRI was on 08/16/2016. CEA 31.7 on 08/16/2016. Cycle # 3 FOLFIRI (added Avastin) was on 08/30/2016 given that ulcers healed on EGD 08/15/2016 by Dr. Yasmani Cullen; Protonix bid since avastin re-started. Colonoscopy on 08/29/2016 (to look at the cecal area) unremarkable. CEA 26.7 on 08/30/2016. Cycle # 4 FOLFIRI/Avastin was on 09/13/2016. CEA 23.4 on 09/13/2016. Cycle # 5 FOLFIRI/Avastin was on 09/27/2016. CEA 22.3 on 09/27/2016. Cycle # 6 FOLFIRI/Avastin was on 10/11/2016. CEA 18.4 on 10/11/2016.      Bone scan 10/21/2016 stable bone metastasis; ? New lesion anterior left sixth rib likely interval healing fracture. CT abdomen/pelvis revealed stable hepatic metastasis. CT chest negative for metastatic disease. Continue FOLFIRI/Avastin and repeat scans in 3 months. Cycle # 7 FOLFIRI/Avastin was on 10/25/2016. CEA 16.1 on 10/25/2016. Cycle # 8 FOLFIRI/Avastin was on 11/08/2016. CEA 15.7 on 11/08/2016. Cycle # 9 FOLFIRI/Avastin was on 11/29/2016. CEA 13.6 on 11/29/2016. Cycle # 10 FOLFIRI/Avastin was on 12/13/2016. CEA 10.6 on 12/13/2016. Cycle # 11 FOLFIRI/Avastin was on 12/27/2016. CEA 11.1 on 12/27/2016. Cycle # 12 FOLFIRI/Avastin was on 01/10/2017. CEA 9.7 on 01/10/2017. CT chest 01/23/2017 No new pulmonary nodule or lymphadenopathy. Patchy groundglass opacities within the left lower lobe, suggestive of infectious or inflammatory etiology.  Followup CT chest in 3 months. Slight increased linear sclerosis along the left anterior sixth rib corresponding to an area of increased uptake on the prior bone scan from 10/21/2016, favored to be related to interval healing changes of a nondisplaced fracture. CT abdomen/pelvis on 01/23/2017 Redemonstration of multiple hepatic metastases, which are similar compared to the prior CT from 10/21/2016. Redemonstration of area of increased sclerosis along the right acetabulum suspicious for metastatic disease. Bone scan on 01/23/2017 Stable subtle uptake within the left proximal diaphysis, again suggestive of metastatic disease  Decreased subtle uptake within the medial right acetabulum. Decreased uptake within the left anterior sixth rib corresponding to linear sclerosis on the recent CT, favored to be related to an joint rib fracture. Continue FOLFIRI + Avastin and repeat scans in 3 months. Cycle # 13 FOLFIRI + Avastin was on 01/24/2017. Cycle # 14 FOLFIRI + Avastin was on 02/07/2017. Cycle # 15 FOLFIRI + Avastin was on 02/21/2017. Cycle # 16 FOLFIRI + Avastin was on 03/07/2017. Cycle # 17 FOLFIRI + Avastin was on 03/21/2017. Cycle # 18 FOLFIRI + Avastin was on 04/04/2017. CT chest 04/17/2017 negative for metastatic disease. CT abdomen/pelvis 04/17/2017 Stable hypodense metastatic lesions within the liver. Stable area of increased sclerosis along the right acetabulum  Bone scan 04/17/2017 Stable subtle uptake within the left proximal femoral diaphysis; No definite abnormal uptake in the region of a sclerotic lesion along the posterior column of the right acetabulum noted on CT. Stable slight uptake within the left anterior sixth rib corresponding to linear sclerosis on the recent CT, favored to be related to a fracture. Continue FOLFIRI + Avastin and repeat scans in 3 months. Cycle # 19 FOLFIRI + Avastin was on 04/18/2017. CEA 7.5 on 04/18/2017. Cycle # 20 FOLFIRI + Avastin was on 05/02/2017.   CEA 7.7 on 05/02/2017. Cycle # 21 FOLFIRI + Avastin was on 05/16/2017. CEA 7.1 on 05/16/2017. Cycle # 22 FOLFIRI + Avastin was on 05/30/2017. CEA 8.2 on 05/30/2017. Cycle # 23 FOLFIRI + Avastin was on 06/13/2017. CEA 7.7 on 06/13/2017. Cycle # 24 FOLFIRI + Avastin was on 06/27/2017. CEA 6.6 on 06/27/2017. Re-staging scans 07/05/2017:  Bone scan stable proximal left femoral diaphysis, right acetabulum, and left anterior sixth rib lesions;  CT abdomen/pelvis stable hypodense metastatic lesions within the liver; CT chest without convincing evidence of metastatic disease. Cycle # 25 FOLFIRI + Avastin was on 07/11/2017. CEA 6.3 on 07/11/2017. Cycle # 26 FOLFIRI + Avastin was on 07/25/2017. CEA 7.3 on 07/25/2017. Cycle # 27 FOLFIRI + Avastin was on 08/08/2017. CEA 6.5 on 08/08/2017. Cycle # 28 FOLFIRI + Avastin was on 08/22/2017. CEA 5.9 on 08/22/2017. Cycle # 29 FOLFIRI + Avastin was on 09/05/2017. CEA 6.3 on 09/05/2017. Cycle # 30 FOLFIRI + Avastin was on 09/19/2017. CEA 6.3 on 09/19/2017. Bone scan 09/26/2017 stable. CT abdomen/pelvis on 09/26/2017 Stable hypodense mass in the liver. Stable sclerotic lesion in the acetabulum. CT chest 09/26/2017 negative for metastatic disease. Cycle # 31 FOLFIRI + Avastin was on 10/03/2017. CEA 7.4 on 10/03/2017. Cycle # 32 FOLFIRI + Avastin was on 10/17/2017. CEA 6.0 on 10/17/2017. Cycle # 33 FOLFIRI + Avastin was on 10/31/2017. CEA 6.8 on 10/31/2017. Cycle # 34 FOLFIRI + Avastin was on 11/14/2017. CEA 7.2 on 11/14/2017. Cycle # 35 FOLFIRI + Avastin was on 11/28/2017. CEA 5.1 on 11/28/2017. Cycle # 36 FOLFIRI + Avastin was on 12/12/2017. CEA 6.1 on 12/12/2017. Bone scan 12/22/2017 noted no evidence of metastatic disease to the axial or appendicular skeleton. CT chest 12/22/2017 noted no evidence of metastatic disease. CT abdomen/pelvis 12/22/2017 noted stable hypodense lesions in the liver.   Continue FOLFIRI + Avastin and repeat scans in 3 months. Cycle # 37 FOLFIRI + Avastin was on 12/27/2018. CEA 6.7 on 12/27/2017. Cycle # 38 FOLFIRI + Avastin was on 01/09/2018. CEA 5.0 on 01/09/2018. Cycle # 39 FOLFIRI + Avastin was on 01/23/2018. CEA 6.5 on 01/23/2018. Cycle # 40 FOLFIRI + Avastin was on 02/06/2018. CEA 6.9 on 02/06/2018. Cycle # 41 FOLFIRI + Avastin was on 02/20/2018. CEA 6.4 on 02/20/2018. Cycle # 42 FOLFIRI + Avastin was on 03/06/2018. CEA 6.6 on 03/06/2018. Cycle # 43 FOLFIRI + Avastin was on 03/20/2018. CEA 5.7 on 03/20/2018. Bone scan on 03/26/2018 negative for metastatic disease. CT chest 03/26/2018 noted ? new 7 mm nodule in LUCY. CT abdomen/pelvis 03/26/2018 noted stable hypodense lesions in the liver. ? New small ascites in the abdomen. Patient wants to continue to monitor the lung finding and repeat scans in 2 months instead of changing the current chemotherapy regimen to oral Lonsurf. Cycle # 44 FOLFIRI + Avastin was on 04/03/2018. CEA 6.3 on 04/03/2018. Cycle # 45 FOLFIRI + Avastin was on 04/24/2018. CEA 5.3 on 04/24/2018. Cycle # 46 FOLFIRI + Avastin was on 05/08/2018. CEA 5.4 on 05/08/2018. Cycle # 47 FOLFIRI + Avastin was on 05/22/2018. CEA 6.1 on 05/22/2018. CT chest 05/31/2018 noted stable nodule in LUCY. No evidence of worsening malignancy. CT abdomen/pelvis on 05/31/2018 noted stable liver metastasis. No evidence of worsening malignancy. Continue FOLFIRI + Avastin and repeat scans in 3 months. Cycle # 48 FOLFIRI + Avastin was on 06/06/2018. CEA 6.3 on 06/05/2018. Cycle # 49 FOLFIRI + Avastin was on 06/19/2018. CEA 6.1 on 06/19/2018. Cycle # 50 FOLFIRI + Avastin was on 07/03/2018. CEA 7.4 on 07/03/2018. Cycle # 51 FOLFIRI + Avastin was on 07/24/2018. CEA 6.7 on 07/24/2018. Cycle # 52 FOLFIRI + Avastin was on 08/14/2018. CEA 6.8 on 08/14/2018. Cycle # 53 FOLFIRI + Avastin was on 08/28/2018. CEA 6.5 on 08/27/2018.   CT chest 09/06/2018 noted interval decrease in size of LUCY measuring up to 4 mm, 02/25/2019. Cycle # 65 FOLFIRI + Avastin was on 03/12/2019. CEA 4.0 on 03/11/2019. Cycle # 66 FOLFIRI + Avastin was on 03/26/2019. CEA 4.7 on 03/25/2019. Cycle # 67 FOLFIRI + Avastin was on 04/09/2019. CEA 5.6 on 04/08/2019. Cycle # 68 FOLFIRI + Avastin was on 04/30/2019. CEA 4.9 on 04/29/2019. Cycle # 69 FOLFIRI + Avastin was on 05/14/2019. CEA 5.3 on 05/14/2019. CT chest 05/23/2019 stable small lung nodule with no evidence of metastatic disease. CT abdomen/pelvis 05/23/2019 Decrease conspicuity of the liver lesions. No new lesions seen. Stable splenomegaly. Continue FOLFIRI + Avastin and repeat scans in 3 months. Cycle # 70 FOLFIRI + Avastin was on 06/04/2019. CEA 4.8 on 06/03/2019. Cycle # 71 FOLFIRI + Avastin was on 06/18/2019. CEA 4.6 on 06/17/2019. Cycle # 72 FOLFIRI + Avastin was on 07/09/2019. CEA 4.3 on 07/08/2019. Cycle # 73 FOLFIRI + Avastin was on 07/30/2019. CEA 5.0 on 07/29/2019. Cycle # 74 FOLFIRI + Avastin was on 08/13/2019. CEA 5.0 on 08/12/2019. CT chest 08/20/2019 negative  CT abdomen/pelvis 08/20/2019 Previous identified hepatic lesions are not visualized on the current exam.  Continue FOLFIRI + Avastin and repeat scans in 3 months. Cycle # 75 FOLFIRI + Avastin was on 08/27/2019. CEA 5.0 on 08/26/2019. Cycle # 76 FOLFIRI + Avastin was on 09/17/2019. CEA 5.5 on 09/16/2019. Cycle # 77 FOLFIRI + Avastin was on 10/15/2019. CEA 4.6 on 10/15/2019. Cycle # 78 FOLFIRI + Avastin was on 10/29/2019. CEA 4.1 on 10/28/2019. Cycle # 79 FOLFIRI + Avastin was on 11/12/2019. CEA 4.3 on 11/12/2019. Cycle # 80 FOLFIRI + Avastin was on 12/10/2019. CEA 4.0 on 12/09/2019. CT chest 12/19/2019 Stable 3 mm nodule within the left upper lobe, similar to the previous studies dating back to 11/2/2018, however decreased in size compared to the CT from 3/26/2018. No thoracic LN.    CT abdomen/pelvis 12/19/2019 Previously described small hypodense lesions are more conspicuous on the current study compared to the recent study throughout the liver from 8/20/2019, however similar to the prior study from 2/21/2019. Findings may be related to differences in contrast opacification. No abdominal or pelvic lymphadenopathy. Continue FOLFIRI + Avastin and repeat scans in 2 months to f/u on liver lesions. Cycle # 81 FOLFIRI + Avastin was on 01/07/2020. CEA 3.8 on 01/06/2020. Cycle # 82 FOLFIRI + Avastin was on 01/21/2020. CEA 3.7 on 01/20/2020. Cycle # 83 FOLFIRI + Avastin was on 02/04/2020. CEA 4.1 on 02/03/2020. Cycle # 84 FOLFIRI + Avastin was on 02/18/2020. CEA 4.6 on 02/17/2020. CT chest 2/28/2020 no evidence of metastatic disease to the lungs and no mediastinal or hilar adenopathy. CT abdomen pelvis 2/28/2020 no enhancing lesions seen within the liver to suggest metastatic disease. Wall thickening of the rectosigmoid junction. Images reviewed. Continue FOLFIRI Avastin and repeat scans in 3 months  EGD by Dr. Luis Rodas 03/02/2020 showing no masses or lesions. Cycle # 85 FOLFIRI + Avastin was on 03/03/2020. CEA 7.4 on 03/02/2020. Cycle # 86 FOLFIRI + Avastin was on 03/17/2020. CEA 4.5 on 03/16/2020. Colonoscopy on 03/23/2020 by Dr. Luis aguero (records requested). Cycle # 87 FOLFIRI + Avastin was on 03/31/2020. CEA 4.1 on 03/30/2020. Cycle # 88 FOLFIRI + Avastin was on 04/21/2020. CEA 6.4 on 04/20/2020. Cycle # 89 FOLFIRI + Avastin was on 05/05/2020. CEA 5.8 on 05/04/2020. Cycle # 90 FOLFIRI + Avastin was on 06/02/2020. CEA 5.5 on 06/01/2020. Cycle # 91 FOLFIRI + Avastin was on 06/16/2020. CEA 5.6 on 06/15/2020. CT chest 06/23/2020 noted no metastatic disease in the chest.   CT abdomen/pelvis 06/23/2020 Stable small liver lesions measuring up to 5 mm since 2019.   22 mm round mass in segment 8 of the liver with central high density stable from December 2019), previously measuring up to 34 mm in 2017. No additional metastatic disease in the abdomen or pelvis.   Small amount of ascites. Overall stable disease. Continue FOLFIRI + Avastin and repeat scans in 2-3 months. Cycle # 92 FOLFIRI + Avastin was on 07/07/2020. CEA 5.7 on 07/06/2020. Cycle # 93 FOLFIRI + Avastin was on 07/21/2020. CEA 5.9 on 07/20/2020. Cycle # 94 FOLFIRI + Avastin was on 08/04/2020. CEA 5.5 on 08/04/2020. Cycle # 95 FOLFIRI + Avastin held today 08/18/2020 due to leukopenia and thrombocytopenia    RTC next week for possible Cycle # 95 FOLFIRI + Avastin.  Scans after next visit  MSI testing noted no mismatch repair protein loss of expression    8/18/2020  Shannan Atkinson MD  Board Certified Medical Oncologist

## 2020-08-18 NOTE — TELEPHONE ENCOUNTER
Called [facility] at [phone], and spoke to Carolina HATFIELD, updated him that no chemotherapy for patient today but he will have repeat labs on the 24 th and chemotherapy on the 25th. Carolina Marie said \"I understand. I will update the nurses. \" Voiced understanding. Ronold Kanner

## 2020-08-24 ENCOUNTER — HOSPITAL ENCOUNTER (OUTPATIENT)
Age: 70
Discharge: HOME | End: 2020-08-24
Payer: MEDICARE

## 2020-08-24 LAB
ALBUMIN SERPL-MCNC: 3.4 G/DL (ref 3.5–5.2)
ALP BLD-CCNC: 169 U/L (ref 40–129)
ALT SERPL-CCNC: 20 U/L (ref 0–40)
ANION GAP SERPL CALCULATED.3IONS-SCNC: 11 MMOL/L (ref 7–16)
ANISOCYTOSIS: ABNORMAL
AST SERPL-CCNC: 34 U/L (ref 0–39)
BASOPHILS ABSOLUTE: 0.03 E9/L (ref 0–0.2)
BASOPHILS RELATIVE PERCENT: 0.9 % (ref 0–2)
BILIRUB SERPL-MCNC: 0.8 MG/DL (ref 0–1.2)
BUN BLDV-MCNC: 17 MG/DL (ref 8–23)
CALCIUM SERPL-MCNC: 8.2 MG/DL (ref 8.6–10.2)
CEA: 6.1 NG/ML (ref 0–5.2)
CHLORIDE BLD-SCNC: 105 MMOL/L (ref 98–107)
CO2: 24 MMOL/L (ref 22–29)
CREAT SERPL-MCNC: 1.1 MG/DL (ref 0.7–1.2)
EOSINOPHILS ABSOLUTE: 0.08 E9/L (ref 0.05–0.5)
EOSINOPHILS RELATIVE PERCENT: 2.6 % (ref 0–6)
GFR AFRICAN AMERICAN: >60
GFR NON-AFRICAN AMERICAN: >60 ML/MIN/1.73
GLUCOSE BLD-MCNC: 105 MG/DL (ref 74–99)
HCT VFR BLD CALC: 24.2 % (ref 37–54)
HEMOGLOBIN: 7.5 G/DL (ref 12.5–16.5)
HYPOCHROMIA: ABNORMAL
LYMPHOCYTES ABSOLUTE: 0.47 E9/L (ref 1.5–4)
LYMPHOCYTES RELATIVE PERCENT: 14.8 % (ref 20–42)
MCH RBC QN AUTO: 32.9 PG (ref 26–35)
MCHC RBC AUTO-ENTMCNC: 31 % (ref 32–34.5)
MCV RBC AUTO: 106.1 FL (ref 80–99.9)
MONOCYTES ABSOLUTE: 0.12 E9/L (ref 0.1–0.95)
MONOCYTES RELATIVE PERCENT: 3.5 % (ref 2–12)
MYELOCYTE PERCENT: 0.9 % (ref 0–0)
NEUTROPHILS ABSOLUTE: 2.42 E9/L (ref 1.8–7.3)
NEUTROPHILS RELATIVE PERCENT: 77.4 % (ref 43–80)
OVALOCYTES: ABNORMAL
PDW BLD-RTO: 20.1 FL (ref 11.5–15)
PLATELET # BLD: 90 E9/L (ref 130–450)
PLATELET CONFIRMATION: NORMAL
PMV BLD AUTO: 10 FL (ref 7–12)
POIKILOCYTES: ABNORMAL
POLYCHROMASIA: ABNORMAL
POTASSIUM SERPL-SCNC: 3.7 MMOL/L (ref 3.5–5)
RBC # BLD: 2.28 E12/L (ref 3.8–5.8)
SODIUM BLD-SCNC: 140 MMOL/L (ref 132–146)
TEAR DROP CELLS: ABNORMAL
TOTAL PROTEIN: 6 G/DL (ref 6.4–8.3)
WBC # BLD: 3.1 E9/L (ref 4.5–11.5)

## 2020-08-24 PROCEDURE — 80053 COMPREHEN METABOLIC PANEL: CPT

## 2020-08-24 PROCEDURE — 36415 COLL VENOUS BLD VENIPUNCTURE: CPT

## 2020-08-24 PROCEDURE — 85025 COMPLETE CBC W/AUTO DIFF WBC: CPT

## 2020-08-24 PROCEDURE — 82378 CARCINOEMBRYONIC ANTIGEN: CPT

## 2020-08-25 ENCOUNTER — HOSPITAL ENCOUNTER (OUTPATIENT)
Dept: INFUSION THERAPY | Age: 70
Discharge: HOME | End: 2020-08-25
Payer: MEDICARE

## 2020-08-25 ENCOUNTER — OFFICE VISIT (OUTPATIENT)
Dept: ONCOLOGY | Age: 70
End: 2020-08-25
Payer: MEDICARE

## 2020-08-25 ENCOUNTER — TELEPHONE (OUTPATIENT)
Dept: INFUSION THERAPY | Age: 70
End: 2020-08-25

## 2020-08-25 VITALS
BODY MASS INDEX: 25.2 KG/M2 | SYSTOLIC BLOOD PRESSURE: 108 MMHG | WEIGHT: 191 LBS | DIASTOLIC BLOOD PRESSURE: 62 MMHG | HEART RATE: 61 BPM | TEMPERATURE: 98.1 F | OXYGEN SATURATION: 100 %

## 2020-08-25 VITALS — HEART RATE: 58 BPM | DIASTOLIC BLOOD PRESSURE: 63 MMHG | SYSTOLIC BLOOD PRESSURE: 119 MMHG

## 2020-08-25 DIAGNOSIS — C78.7 RECTAL CANCER METASTASIZED TO LIVER (HCC): Primary | ICD-10-CM

## 2020-08-25 DIAGNOSIS — C20 RECTAL CANCER METASTASIZED TO LIVER (HCC): Primary | ICD-10-CM

## 2020-08-25 PROCEDURE — 6360000002 HC RX W HCPCS: Performed by: INTERNAL MEDICINE

## 2020-08-25 PROCEDURE — 99214 OFFICE O/P EST MOD 30 MIN: CPT | Performed by: INTERNAL MEDICINE

## 2020-08-25 PROCEDURE — 96417 CHEMO IV INFUS EACH ADDL SEQ: CPT

## 2020-08-25 PROCEDURE — 96415 CHEMO IV INFUSION ADDL HR: CPT

## 2020-08-25 PROCEDURE — 2580000003 HC RX 258: Performed by: INTERNAL MEDICINE

## 2020-08-25 PROCEDURE — 96413 CHEMO IV INFUSION 1 HR: CPT

## 2020-08-25 PROCEDURE — 96375 TX/PRO/DX INJ NEW DRUG ADDON: CPT

## 2020-08-25 PROCEDURE — 96368 THER/DIAG CONCURRENT INF: CPT

## 2020-08-25 PROCEDURE — 96411 CHEMO IV PUSH ADDL DRUG: CPT

## 2020-08-25 RX ORDER — SODIUM CHLORIDE 0.9 % (FLUSH) 0.9 %
10 SYRINGE (ML) INJECTION PRN
Status: DISCONTINUED | OUTPATIENT
Start: 2020-08-25 | End: 2020-08-26 | Stop reason: HOSPADM

## 2020-08-25 RX ORDER — SODIUM CHLORIDE 9 MG/ML
250 INJECTION, SOLUTION INTRAVENOUS CONTINUOUS
Status: DISCONTINUED | OUTPATIENT
Start: 2020-08-25 | End: 2020-08-26 | Stop reason: HOSPADM

## 2020-08-25 RX ORDER — HEPARIN SODIUM (PORCINE) LOCK FLUSH IV SOLN 100 UNIT/ML 100 UNIT/ML
500 SOLUTION INTRAVENOUS PRN
Status: DISCONTINUED | OUTPATIENT
Start: 2020-08-25 | End: 2020-08-26 | Stop reason: HOSPADM

## 2020-08-25 RX ORDER — PALONOSETRON HYDROCHLORIDE 0.05 MG/ML
0.25 INJECTION, SOLUTION INTRAVENOUS ONCE
Status: COMPLETED | OUTPATIENT
Start: 2020-08-25 | End: 2020-08-25

## 2020-08-25 RX ORDER — FLUOROURACIL 50 MG/ML
850 INJECTION, SOLUTION INTRAVENOUS ONCE
Status: COMPLETED | OUTPATIENT
Start: 2020-08-25 | End: 2020-08-25

## 2020-08-25 RX ORDER — ATROPINE SULFATE 0.4 MG/ML
0.4 AMPUL (ML) INJECTION ONCE
Status: COMPLETED | OUTPATIENT
Start: 2020-08-25 | End: 2020-08-25

## 2020-08-25 RX ORDER — DEXAMETHASONE SODIUM PHOSPHATE 10 MG/ML
10 INJECTION INTRAMUSCULAR; INTRAVENOUS ONCE
Status: COMPLETED | OUTPATIENT
Start: 2020-08-25 | End: 2020-08-25

## 2020-08-25 RX ADMIN — PALONOSETRON 0.25 MG: 0.25 INJECTION, SOLUTION INTRAVENOUS at 08:40

## 2020-08-25 RX ADMIN — BEVACIZUMAB 400 MG: 400 INJECTION, SOLUTION INTRAVENOUS at 09:14

## 2020-08-25 RX ADMIN — FLUOROURACIL 850 MG: 50 INJECTION, SOLUTION INTRAVENOUS at 11:35

## 2020-08-25 RX ADMIN — ATROPINE SULFATE 0.4 MG: 0.4 INJECTION, SOLUTION INTRAMUSCULAR; INTRAVENOUS; SUBCUTANEOUS at 09:13

## 2020-08-25 RX ADMIN — SODIUM CHLORIDE 400 MG: 9 INJECTION, SOLUTION INTRAVENOUS at 09:50

## 2020-08-25 RX ADMIN — LEUCOVORIN CALCIUM 850 MG: 10 INJECTION INTRAMUSCULAR; INTRAVENOUS at 09:50

## 2020-08-25 RX ADMIN — SODIUM CHLORIDE 250 ML: 9 INJECTION, SOLUTION INTRAVENOUS at 08:32

## 2020-08-25 RX ADMIN — Medication 500 UNITS: at 11:39

## 2020-08-25 RX ADMIN — SODIUM CHLORIDE, PRESERVATIVE FREE 10 ML: 5 INJECTION INTRAVENOUS at 11:39

## 2020-08-25 RX ADMIN — DEXAMETHASONE SODIUM PHOSPHATE 10 MG: 10 INJECTION INTRAMUSCULAR; INTRAVENOUS at 08:41

## 2020-08-25 NOTE — PROGRESS NOTES
Updated Cyril Sanchez from [place], fax [phone], that Dr. Raulito Montenegro is going to give patient his chemotherapy today and that he will need his pump placed on later today. His orders were faxed to the number listed above and received confirmation. Cyril Sanchez voiced understanding and stated \"I will update his nurses, thank you. \" Voiced understanding.

## 2020-08-25 NOTE — TELEPHONE ENCOUNTER
Per Dr. Magdiel Ray to move patient's Xgeva injection from 9-1-20 to 9-8-20 with his next cycle of chemotherapy. \"

## 2020-08-25 NOTE — PROGRESS NOTES
[place] [id number]  Attending Clinic Note     Reason for Visit: Follow-up on a patient with Metastatic Rectal Cancer.     PCP: Maggy Hedrick MD     History of Present Illness:  70 y/o  male who was referred to see Dr. Cyndi Thompson (GI team) for evaluation of bright red blood per rectum, mild anemia and change in bowel habits with diarrhea. CEA 2640 on 10/19/2015. AlcP 299 AST 57 ALT 75 on 10/19/2015. Colonoscopy in 2013 noted no significant polyps, colitis or lesions at that time. Denies any Family History of colorectal cancer or polyps.     Colonoscopy on 10/19/2015 revealed:  1. Ascending polyp, 8 mm, hot snare: Tubulovillous adenoma. 2. Transverse polyp, 1 cm, hot snare: Serrated polyp most consistent with sessile serrated adenoma. 3. Five splenic flexure polyps, three - 5 mm, 7 mm, 8 mm, hot snare and biopsy: Four segments of Tubular Adenoma  4. Descending polyp, 5 mm, biopsy: Tubular adenoma. 5. Sigmoid polyp, 4 mm biopsy, Serrated polyp most consistent with sessile serrated adenoma. 6. Two rectal polyps, 4 mm biopsy: Serrated polyp most consistent with hyperplastic polyp. 7. Rectosigmoid colon mass (large mass approximately 65% circumference of the lumen; Very friable, firm and hard): Tubulovillous adenoma with associated focal erosion and fibroplasia.      CT scan abdomen/pelvis on 10/26/2015:  1. Small nodules at lung bases likely represent metastatic colon   cancer. 2. Extensive likely metastatic colon cancer throughout the liver.      3. Mild mural thickening and luminal narrowing in the   terminal ileum, otherwise nonspecific.      Colonoscopy with snare removal rectal mass was performed by Dr. Albert Diamond. Pathology proved:  Rectal polyp: Invasive adenocarcinoma involving villous adenoma and extending to the cauterized edge of excision. KRAS Mutation: Mutation detected.   BRAF Mutation: Mutation not detected. 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     CEA 3488. Bone scan on 11/10/2015 noted no metastatic disease. CT chest on 11/10/2015 revealed Multiple pulmonary nodules in the upper and lower lobes consistent with metastatic disease;   Hepatic metastasis also visualized. 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 Mediport was placed. Cycle # 1 of FOLFOX + Avastin was on 11/23/2015. CEA was 4555 on 11/23/2015. Cycle # 2 of FOLFOX + Avastin was on 12/08/2015. CEA was 3160 on 12/08/2015. Cycle # 3 of FOLFOX + Avastin was on 12/22/2015. CEA was 2516 on 12/21/2015. Cycle # 4 of FOLFOX + Avastin was on 01/05/2016. CEA was 2098 on 01/05/2015. Cycle # 5 of FOLFOX + Avastin was on 01/19/2016. CEA was 1511 on 01/05/2015.     -Bone scan on 01/26/2016 noted no metastatic disease. CT chest on 01/26/2016 revealed Significant response to treatment with no visible residual nodules. CT scan abdomen/pelvis on 01/26/2016 noted Interval decreased size of multiple masses in the liver compatible with treatment response. Continue another 2 months of FOLFOX + Avastin and repeat scans. Cycle # 6 of FOLFOX + Avastin was on 02/02/2016.  on 02/02/2016. Cycle # 7 of FOLFOX + Avastin was on 02/16/2016.  on 02/16/2016. Cycle # 8 of FOLFOX + Avastin was on 03/01/2016.  on 03/01/2016. Cycle # 9 of FOLFOX + Avastin was on 03/15/2016.  on 03/15/2016. Cycle # 10 of FOLFOX + Avastin was on 03/29/2016. .4 on 03/29/2016. Cycle # 11 of FOLFOX + Avastin was on 04/12/2016. .4 on 04/12/2016.     Re-staging scans on 04/19/2016: CT Chest: clear lungs; no evidence of recurring pulmonary nodule; CT Abdomen/Pelvis: Further interval decrease in size of the multiple metastatic hepatic lesions, the largest lesion now measures 3.9 x 3.5 cm and previously measured 4.5 cm in maximum diameter.  No mesenteric lymphadenopathy is identified; Bone Scan: No evidence of osseous metastasis. Continue same regimen and re-stage in 2-3 months. Cycle # 12 FOLFOX + Avastin was on 04/26/2016. .5 on 04/26/2016. Cycle # 13 FOLFOX + Avastin was on 05/10/2016. .3 on 05/10/2016. Cycle # 14 FOLFOX+AVASTIN was on 05/24/2016. .5 on 05/24/2016. Cycle # 15 FOLFOX + Avastin was on 06/07/2016. CEA 90.5 on 06/07/2016. Admitted to [facility] 06/13/2016-06/16/2016 for abdominal pain: EGD noted 1.5 cm clean based duodenal bulb ulceration s/p epinephrine and bicap per Dr. Dimitris Ortiz. No active bleeding. A. Stomach, biopsy: Mild chronic gastritis, immunostain negative for Helicobacter  B. Esophagus, biopsy: Gastric glandular mucosa with prominent intestinal metaplasia ([place]'s epithelium), negative for epithelial dysplasia, esophageal squamous mucosa not identified  Cycle # 16 FOLFOX (discontinued avastin (bevacizumab) given association of peptic ulcer disease and known association of GI perforation) was on 06/21/2016. Cycle # 17 FOLFOX was on 07/05/2016. CEA 52.6 on 07/19/2016. Cycle # 17 FOLFOX was on 07/05/2016. CEA 52.6 on 07/05/2016. CEA 47.6 on 07/19/2016.     Re-staging scans 07/19/2106: CT Chest negative for metastatic disease. CT Abdomen/Pelvis: Stable hepatic lesions; Question new mural thickening in the cecum. Bone Scan: New Let hip lesion suspicious for bone metastasis.     Increased CEA; new mural thickening in the cecum and new left hip lesion suspicious for bone metastasis are consistent with disease progression; He derived maximum benefit from FOLFOX/AVASTIN. D/C FOLFOX/AVASTIN. We recommended FOLFIRI second line therapy. Cycle # 1 FOLFIRI was on 08/02/2016. CEA 40.8 on 08/02/2016. Xgeva q4 weeks started on 08/02/2016. Cycle # 2 FOLFIRI was on 08/16/2016. CEA 31.7 on 08/16/2016. Cycle # 3 FOLFIRI (added Avastin) was on 08/30/2016 given that ulcers healed on EGD 08/15/2016 by Dr. Luis Rodas; Protonix bid since avastin re-started.    Colonoscopy on 10/17/2017. Cycle # 33 FOLFIRI + Avastin was on 10/31/2017. CEA 6.8 on 10/31/2017. Cycle # 34 FOLFIRI + Avastin was on 11/14/2017. CEA 7.2 on 11/14/2017. Cycle # 35 FOLFIRI + Avastin was on 11/28/2017. CEA 5.1 on 11/28/2017. Cycle # 36 FOLFIRI + Avastin was on 12/12/2017. CEA 6.1 on 12/12/2017. Bone scan 12/22/2017 noted no evidence of metastatic disease to the axial or appendicular skeleton. CT chest 12/22/2017 noted no evidence of metastatic disease. CT abdomen/pelvis 12/22/2017 noted stable hypodense lesions in the liver. Continue FOLFIRI + Avastin and repeat scans in 3 months. Cycle # 37 FOLFIRI + Avastin was on 12/27/2018. Cycle # 38 FOLFIRI + Avastin was on 01/09/2018. Cycle # 39 FOLFIRI + Avastin was on 01/23/2018. Cycle # 40 FOLFIRI + Avastin was on 02/06/2018. Cycle # 41 FOLFIRI + Avastin was on 02/20/2018. Cycle # 42 FOLFIRI + Avastin was on 03/06/2018. Cycle # 43 FOLFIRI + Avastin was on 03/20/2018. Bone scan on 03/26/2018 negative for metastatic disease. CT chest 03/26/2018 noted ? new 7 mm nodule in LUCY. CT abdomen/pelvis 03/26/2018 noted stable hypodense lesions in the liver. ? New small ascites in the abdomen. Patient wants to continue to monitor the lung finding and repeat scans in 2 months instead of changing the current regimen into oral Lonsurf. Cycle # 44 FOLFIRI + Avastin was on 04/03/2018. CEA 6.3 on 04/03/2018. Cycle # 45 FOLFIRI + Avastin was on 04/24/2018. CEA 5.3 on 04/24/2018. Cycle # 46 FOLFIRI + Avastin was on 05/08/2018. CEA 5.4 on 05/08/2018. Cycle # 47 FOLFIRI + Avastin was on 05/22/2018. CEA 6.1 on 05/22/2018. CT chest 05/31/2018 noted stable nodule in LUCY. No evidence of worsening malignancy. CT abdomen/pelvis on 05/31/2018 noted stable liver metastasis. No evidence of worsening malignancy. Continue FOLFIRI + Avastin and repeat scans in 3 months. Cycle # 48 FOLFIRI + Avastin was on 06/06/2018. Cycle # 49 FOLFIRI + Avastin was on 06/19/2018. Cycle # 50 FOLFIRI + Avastin was on 07/03/2018. Cycle # 51 FOLFIRI + Avastin was on 07/24/2018. Cycle # 52 FOLFIRI + Avastin was on 08/14/2018. Cycle # 53 FOLFIRI + Avastin was on 08/28/2018. CT chest 09/06/2018 noted interval decrease in size of LUCY measuring up to 4 mm, suggestive of treatment response. No mediastinal or hilar LN  CT abdomen/pelvis 09/06/2018 Slight interval increase in size of a previously noted metastatic lesion within the right hepatic lobe. This may be related to differences in phase of enhancement compared to the most recent study from 5/31/2018, the lesion remains decreased in size compared to the more previous studies. No abdominal, retroperitoneal, or pelvic lymphadenopathy. Continue FOLFIRI + Avastin and repeat scans in 2 months. Cycle # 54 FOLFIRI + Avastin was on 09/11/2018. Cycle # 55 FOLFIRI + Avastin was on 09/25/2018. Cycle # 56 FOLFIRI + Avastin was on 10/09/2018. Cycle # 57 FOLFIRI + Avastin was on 10/23/2018. CT chest 11/02/2018 stable 3 mm nodule within LUCY. No new right and left lung nodule. No mediastinal or osseous lesion. CT abdomen/pelvis 11/02/2018 stable metastatic disease to liver. No new metastatic disease identified. No mesenteric or retroperitoneal LN. No gross colonic lesion identified. Continue FOLFIRI + Avastin and repeat scans in 3 months. Cycle # 58 FOLFIRI + Avastin was on 11/06/2018. CEA 7.6 on 11/05/2018. Cycle # 59 FOLFIRI + Avastin was on 11/27/2018. CEA 6.9 on 11/26/2018. Cycle # 60 FOLFIRI + Avastin was on 12/11/2018. CEA 6.7 on 12/11/2018. Cycle # 61 FOLFIRI + Avastin was on 01/08/2019. CEA 5.6 on 01/07/2019. Cycle # 62 FOLFIRI + Avastin was on 01/29/2019. CEA 4.6 on 01/28/2019. Cycle # 63 FOLFIRI + Avastin was on 02/12/2019. CEA 4.3 on 02/11/2019. CT chest 02/21/2019 no evidence of metastatic disease. CT abdomen/pelvis 02/21/2019 stable hypodense liver lesions. No evidence of worsening metastatic disease.  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 Ordered MRI thoracic spine and referred to neurosurgery team.    Cycle # 64 FOLFIRI + Avastin was on 02/26/2019. CEA 4.7 on 02/25/2019. Cycle # 65 FOLFIRI + Avastin was on 03/12/2019. CEA 4.0 on 03/11/2019. Cycle # 66 FOLFIRI + Avastin was on 03/26/2019. CEA 4.7 on 03/25/2019. Cycle # 67 FOLFIRI + Avastin was on 04/09/2019. CEA 5.6 on 04/08/2019. Cycle # 68 FOLFIRI + Avastin was on 04/30/2019. CEA 4.9 on 04/29/2019. Cycle # 69 FOLFIRI + Avastin was on 05/14/2019. CEA 5.3 on 05/14/2019. CT chest 05/23/2019 stable small lung nodule with no evidence of metastatic disease. CT abdomen/pelvis 05/23/2019 Decrease conspicuity of the liver lesions. No new lesions seen. Stable splenomegaly. Continue FOLFIRI + Avastin and repeat scans in 3 months. Cycle # 70 FOLFIRI + Avastin was on 06/04/2019. CEA 4.8 on 06/03/2019. Cycle # 71 FOLFIRI + Avastin was on 06/18/2019. CEA 4.6 on 06/17/2019. Cycle # 72 FOLFIRI + Avastin was on 07/09/2019. CEA 4.3 on 07/08/2019. Cycle # 73 FOLFIRI + Avastin was on 07/30/2019. CEA 5.0 on 07/29/2019. Cycle # 74 FOLFIRI + Avastin was on 08/13/2019. CEA 5.0 on 08/12/2019. CT chest 08/20/2019 negative  CT abdomen/pelvis 08/20/2019 Previous identified hepatic lesions are not visualized on the current exam.  Continue FOLFIRI + Avastin and repeat scans in 3 months. Cycle # 75 FOLFIRI + Avastin was on 08/27/2019. CEA 5.0 on 08/26/2019. Cycle # 76 FOLFIRI + Avastin was on 09/17/2019. CEA 5.5 on 09/16/2019. Cycle # 77 FOLFIRI + Avastin was on 10/15/2019. CEA 4.6 on 10/15/2019. Cycle # 78 FOLFIRI + Avastin was on 10/29/2019. CEA 4.1 on 10/28/2019. Cycle # 79 FOLFIRI + Avastin was on 11/12/2019. CEA 4.3 on 11/12/2019. Cycle # 80 FOLFIRI + Avastin was on 12/10/2019. CEA 4.0 on 12/09/2019.   CT chest 12/19/2019 Stable 3 mm nodule within the left upper lobe, similar to the previous studies dating back to 11/2/2018, however decreased in size compared to the CT from 3/26/2018. No thoracic LN. CT abdomen/pelvis 12/19/2019 Previously described small hypodense lesions are more conspicuous on the current study compared to the recent study throughout the liver from 8/20/2019, however similar to the prior study from 2/21/2019. Findings may be related to differences in contrast opacification. No abdominal or pelvic lymphadenopathy. Continue FOLFIRI + Avastin and repeat scans in 2 months to f/u on liver lesions. Cycle # 81 FOLFIRI + Avastin was on 01/07/2020. CEA 3.8 on 01/06/2020. Cycle # 82 FOLFIRI + Avastin was on 01/21/2020. CEA 3.7 on 01/20/2020. Cycle # 83 FOLFIRI + Avastin was on 02/04/2020. CEA 4.1 on 02/03/2020. Cycle # 84 FOLFIRI + Avastin was on 02/18/2020. CEA 4.6 on 02/17/2020. EGD by Dr. Lu Jones 03/02/2020 showing no masses or lesions  Cycle # 85 FOLFIRI + Avastin was on 03/03/2020. CEA 7.4 on 03/02/2020. Cycle # 86 FOLFIRI + Avastin was on 03/17/2020. CEA 4.5 on 03/16/2020. Colonoscopy on 03/23/2020 by Dr. Lu Jones unremarkable (records requested). Cycle # 87 FOLFIRI + Avastin was on 03/31/2020. CEA 4.1 on 03/30/2020. Cycle # 88 FOLFIRI + Avastin was on 04/21/2020. CEA 6.4 on 04/20/2020. Cycle # 89 FOLFIRI + Avastin was on 05/05/2020. CEA 5.8 on 05/04/2020. Cycle # 90 FOLFIRI + Avastin was on 06/02/2020. CEA 5.5 on 06/01/2020. Cycle # 91 FOLFIRI + Avastin was on 06/16/2020. CEA 5.6 on 06/15/2020. CT chest 06/23/2020 noted no metastatic disease in the chest.   CT abdomen/pelvis 06/23/2020 Stable small liver lesions measuring up to 5 mm since 2019.   22 mm round mass in segment 8 of the liver with central high density stable from December 2019), previously measuring up to 34 mm in 2017. No additional metastatic disease in the abdomen or pelvis. Small amount of ascites. Overall stable disease. Continue FOLFIRI + Avastin and repeat scans in 2-3 months. Cycle # 92 FOLFIRI + Avastin was on 07/07/2020.  CEA 5.7 on 07/06/2020. Cycle # 93 FOLFIRI + Avastin was on 07/21/2020. CEA 5.9 on 07/20/2020. Cycle # 94 FOLFIRI + Avastin was on 08/04/2020. CEA 5.5 on 08/04/2020. He presented today 08/25/2020 Cycle # 95 FOLFIRI + Avastin. No fever, chills. No chest pain or SOB. No nausea or vomiting. No diarrhea. Fatigue    Review of Systems;  CONSTITUTIONAL: No fever, chills. Fair appetite. Fatigue. ENMT: Eyes: No diplopia; Nose: No epistaxis. Mouth:No lesions  RESPIRATORY: No hemoptysis, shortness of breath. CARDIOVASCULAR: No chest pain, palpitations. GASTROINTESTINAL: No nausea/vomiting, abdominal pain. GENITOURINARY: No dysuria, urinary frequency, hematuria. NEURO: No syncope, presyncope, headache. Remainder ROS: Negative. Past Medical History:   Past Medical History               Diagnosis Date    Arthritis      Cancer ([facility], [place] [address]         colon    Depression      Hyperlipidemia      Hypertension           Medications:  Reviewed and reconciled.     Allergies: Allergies   Allergen Reactions    Neosporin [Neomycin-Polymyxin-Gramicidin] Rash    Tape Joan Ends Tape] Rash      Physical Exam:  /62 (Site: Right Upper Arm, Position: Sitting, Cuff Size: Medium Adult)   Pulse 61   Temp 98.1 °F (36.7 °C) (Temporal)   Wt 191 lb (86.6 kg)   SpO2 100%   BMI 25.20 kg/m²   GENERAL: Alert, oriented x 3, not in acute distress  HEENT: PERRLA, EOMI. No oral lesions. NECK: Supple. Without lymphadenopathy. LUNGS: Lungs are CTA bilaterally, with no wheezing, crackles or rhonchi. CARDIOVASCULAR: Regular rhythm. No murmurs, rubs or gallops. ABDOMEN: Soft. Non-tender, non-distended. Positive bowel sounds. EXTREMITIES: Without clubbing, cyanosis or edema. NEUROLOGIC: No focal deficits.    ECOG PS 1    Diagnostics:  Lab Results   Component Value Date    WBC 3.1 (L) 08/24/2020    HGB 7.5 (L) 08/24/2020    HCT 24.2 (L) 08/24/2020    .1 (H) 08/24/2020    PLT 90 (L) 08/24/2020     Lab Results   Component Value scans.  Cycle # 6 of FOLFOX + Avastin was on 02/02/2016.  on 02/02/2016. Cycle # 7 of FOLFOX + Avastin was on 02/16/2016.  on 02/16/2016. Cycle # 8 of FOLFOX + Avastin was on 03/01/2016.  on 03/01/2016. Cycle # 9 of FOLFOX + Avastin was on 03/15/2016.  on 03/15/2016. Cycle # 10 of FOLFOX + Avastin was on 03/29/2016. .4 on 03/29/2016. Cycle # 11 of FOLFOX + Avastin was on 04/12/2016. .4 on 04/12/2016.     Re-staging scans on 04/19/2016: CT Chest: clear lungs; no evidence of recurring pulmonary nodule; CT Abdomen/Pelvis: Further interval decrease in size of the multiple metastatic hepatic lesions, the largest lesion now measures 3.9 x 3.5 cm and previously measured 4.5 cm in maximum diameter. No mesenteric lymphadenopathy is identified; Bone Scan: No evidence of osseous metastasis. Continue same regimen and re-stage in 2-3 months. Cycle # 12 FOLFOX + Avastin was on 04/26/2016. .5 on 04/26/2016. Cycle # 13 FOLFOX + Avastin was on 05/10/2016. .3 on 05/10/2016. Cycle # 14 FOLFOX+AVASTIN was on 05/24/2016. .5 on 05/24/2016. Cycle # 15 FOLFOX + Avastin was on 06/07/2016. CEA 90.5 on 06/07/2016. Admitted to [facility] 06/13/2016-06/16/2016 for abdominal pain: EGD noted 1.5 cm clean based duodenal bulb ulceration s/p epinephrine and bicap per Dr. Demetrius Alaniz. No active bleeding. A. Stomach, biopsy: Mild chronic gastritis, immunostain negative for Helicobacter  B. Esophagus, biopsy: Gastric glandular mucosa with prominent ntestinal metaplasia ([place]'s epithelium), negative for epithelial dysplasia, esophageal squamous mucosa not identified     Cycle # 16 FOLFOX (discontinued avastin (bevacizumab) given association of peptic ulcer disease and known association of GI perforation.) was on 06/21/2016. CEA 47 on 06/21/2016. Cycle # 17 FOLFOX was on 07/05/2016. CEA 52.6 on 07/05/2016.  CEA 47.6 on 07/19/2016.     Re-staging scans 07/19/2106: CT Chest negative for metastatic disease. CT Abdomen/Pelvis: Stable hepatic lesions; Question new mural thickening in the cecum. Bone Scan: New Let hip lesion suspicious for bone metastasis.     Increased CEA; new mural thickening in the cecum and new left hip lesion suspicious for bone metastasis are consistent with disease progression; He derived maximum benefit from FOLFOX/AVASTIN. D/C FOLFOX/AVASTIN. We recommended FOLFIRI second line therapy. Cycle # 1 FOLFIRI was on 08/02/2016. CEA 40.8 on 08/02/2016. Xgeva q4 weeks started on 08/02/2016. Cycle # 2 FOLFIRI was on 08/16/2016. CEA 31.7 on 08/16/2016. Cycle # 3 FOLFIRI (added Avastin) was on 08/30/2016 given that ulcers healed on EGD 08/15/2016 by Dr. Demar Leonard; Protonix bid since avastin re-started. Colonoscopy on 08/29/2016 (to look at the cecal area) unremarkable. CEA 26.7 on 08/30/2016. Cycle # 4 FOLFIRI/Avastin was on 09/13/2016. CEA 23.4 on 09/13/2016. Cycle # 5 FOLFIRI/Avastin was on 09/27/2016. CEA 22.3 on 09/27/2016. Cycle # 6 FOLFIRI/Avastin was on 10/11/2016. CEA 18.4 on 10/11/2016.      Bone scan 10/21/2016 stable bone metastasis; ? New lesion anterior left sixth rib likely interval healing fracture. CT abdomen/pelvis revealed stable hepatic metastasis. CT chest negative for metastatic disease. Continue FOLFIRI/Avastin and repeat scans in 3 months. Cycle # 7 FOLFIRI/Avastin was on 10/25/2016. CEA 16.1 on 10/25/2016. Cycle # 8 FOLFIRI/Avastin was on 11/08/2016. CEA 15.7 on 11/08/2016. Cycle # 9 FOLFIRI/Avastin was on 11/29/2016. CEA 13.6 on 11/29/2016. Cycle # 10 FOLFIRI/Avastin was on 12/13/2016. CEA 10.6 on 12/13/2016. Cycle # 11 FOLFIRI/Avastin was on 12/27/2016. CEA 11.1 on 12/27/2016. Cycle # 12 FOLFIRI/Avastin was on 01/10/2017. CEA 9.7 on 01/10/2017. CT chest 01/23/2017 No new pulmonary nodule or lymphadenopathy. Patchy groundglass opacities within the left lower lobe, suggestive of infectious or inflammatory etiology.  Followup CT chest in 3 months. Slight increased linear sclerosis along the left anterior sixth rib corresponding to an area of increased uptake on the prior bone scan from 10/21/2016, favored to be related to interval healing changes of a nondisplaced fracture. CT abdomen/pelvis on 01/23/2017 Redemonstration of multiple hepatic metastases, which are similar compared to the prior CT from 10/21/2016. Redemonstration of area of increased sclerosis along the right acetabulum suspicious for metastatic disease. Bone scan on 01/23/2017 Stable subtle uptake within the left proximal diaphysis, again suggestive of metastatic disease  Decreased subtle uptake within the medial right acetabulum. Decreased uptake within the left anterior sixth rib corresponding to linear sclerosis on the recent CT, favored to be related to an joint rib fracture. Continue FOLFIRI + Avastin and repeat scans in 3 months. Cycle # 13 FOLFIRI + Avastin was on 01/24/2017. Cycle # 14 FOLFIRI + Avastin was on 02/07/2017. Cycle # 15 FOLFIRI + Avastin was on 02/21/2017. Cycle # 16 FOLFIRI + Avastin was on 03/07/2017. Cycle # 17 FOLFIRI + Avastin was on 03/21/2017. Cycle # 18 FOLFIRI + Avastin was on 04/04/2017. CT chest 04/17/2017 negative for metastatic disease. CT abdomen/pelvis 04/17/2017 Stable hypodense metastatic lesions within the liver. Stable area of increased sclerosis along the right acetabulum  Bone scan 04/17/2017 Stable subtle uptake within the left proximal femoral diaphysis; No definite abnormal uptake in the region of a sclerotic lesion along the posterior column of the right acetabulum noted on CT. Stable slight uptake within the left anterior sixth rib corresponding to linear sclerosis on the recent CT, favored to be related to a fracture. Continue FOLFIRI + Avastin and repeat scans in 3 months. Cycle # 19 FOLFIRI + Avastin was on 04/18/2017. CEA 7.5 on 04/18/2017. Cycle # 20 FOLFIRI + Avastin was on 05/02/2017.   CEA 7.7 on 05/02/2017. Cycle # 21 FOLFIRI + Avastin was on 05/16/2017. CEA 7.1 on 05/16/2017. Cycle # 22 FOLFIRI + Avastin was on 05/30/2017. CEA 8.2 on 05/30/2017. Cycle # 23 FOLFIRI + Avastin was on 06/13/2017. CEA 7.7 on 06/13/2017. Cycle # 24 FOLFIRI + Avastin was on 06/27/2017. CEA 6.6 on 06/27/2017. Re-staging scans 07/05/2017:  Bone scan stable proximal left femoral diaphysis, right acetabulum, and left anterior sixth rib lesions;  CT abdomen/pelvis stable hypodense metastatic lesions within the liver; CT chest without convincing evidence of metastatic disease. Cycle # 25 FOLFIRI + Avastin was on 07/11/2017. CEA 6.3 on 07/11/2017. Cycle # 26 FOLFIRI + Avastin was on 07/25/2017. CEA 7.3 on 07/25/2017. Cycle # 27 FOLFIRI + Avastin was on 08/08/2017. CEA 6.5 on 08/08/2017. Cycle # 28 FOLFIRI + Avastin was on 08/22/2017. CEA 5.9 on 08/22/2017. Cycle # 29 FOLFIRI + Avastin was on 09/05/2017. CEA 6.3 on 09/05/2017. Cycle # 30 FOLFIRI + Avastin was on 09/19/2017. CEA 6.3 on 09/19/2017. Bone scan 09/26/2017 stable. CT abdomen/pelvis on 09/26/2017 Stable hypodense mass in the liver. Stable sclerotic lesion in the acetabulum. CT chest 09/26/2017 negative for metastatic disease. Cycle # 31 FOLFIRI + Avastin was on 10/03/2017. CEA 7.4 on 10/03/2017. Cycle # 32 FOLFIRI + Avastin was on 10/17/2017. CEA 6.0 on 10/17/2017. Cycle # 33 FOLFIRI + Avastin was on 10/31/2017. CEA 6.8 on 10/31/2017. Cycle # 34 FOLFIRI + Avastin was on 11/14/2017. CEA 7.2 on 11/14/2017. Cycle # 35 FOLFIRI + Avastin was on 11/28/2017. CEA 5.1 on 11/28/2017. Cycle # 36 FOLFIRI + Avastin was on 12/12/2017. CEA 6.1 on 12/12/2017. Bone scan 12/22/2017 noted no evidence of metastatic disease to the axial or appendicular skeleton. CT chest 12/22/2017 noted no evidence of metastatic disease. CT abdomen/pelvis 12/22/2017 noted stable hypodense lesions in the liver. Continue FOLFIRI + Avastin and repeat scans in 3 months.   Cycle treatment response. No mediastinal or hilar LN  CT abdomen/pelvis 09/06/2018 Slight interval increase in size of a previously noted metastatic lesion within the right hepatic lobe. This may be related to differences in phase of enhancement compared to the most recent study from 5/31/2018, the lesion remains decreased in size compared to the more previous studies. No abdominal, retroperitoneal, or pelvic lymphadenopathy. Continue FOLFIRI + Avastin and repeat scans in 2 months. Cycle # 54 FOLFIRI + Avastin was on 09/11/2018. CEA 6.4 on 09/10/2018. Cycle # 55 FOLFIRI + Avastin was on 09/25/2018. CEA 6.4 on 09/25/2018. Cycle # 56 FOLFIRI + Avastin was on 10/09/2018. CEA 6.7 on 10/08/2018. Cycle # 57 FOLFIRI + Avastin was on 10/23/2018. CEA 7.2 on 10/22/2018. CT chest 11/02/2018 stable 3 mm nodule within LUCY. No new right and left lung nodule. No mediastinal or osseous lesion. CT abdomen/pelvis 11/02/2018 stable metastatic disease to liver. No new metastatic disease identified. No mesenteric or retroperitoneal LN. No gross colonic lesion identified. Continue FOLFIRI + Avastin and repeat scans in 3 months. Cycle # 58 FOLFIRI + Avastin was on 11/06/2018. CEA 7.6 on 11/05/2018. Cycle # 59 FOLFIRI + Avastin was on 11/27/2018. CEA 6.9 on 11/26/2018. Cycle # 60 FOLFIRI + Avastin was on 12/11/2018. CEA 6.7 on 12/11/2018. Cycle # 61 FOLFIRI + Avastin was on 01/08/2019. CEA 5.6 on 01/07/2019. Cycle # 62 FOLFIRI + Avastin was on 01/29/2019. CEA 4.6 on 01/28/2019. Cycle # 63 FOLFIRI + Avastin was on 02/12/2019. CEA 4.3 on 02/11/2019. CT chest 02/21/2019 no evidence of metastatic disease. CT abdomen/pelvis 02/21/2019 stable hypodense liver lesions. No evidence of worsening metastatic disease. 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 Ordered MRI thoracic spine and referred to neurosurgery team.  Cycle # 64 FOLFIRI + Avastin was on 02/26/2019. CEA 4.7 on 02/25/2019.   Cycle # 65 FOLFIRI + Avastin was on 03/12/2019. CEA 4.0 on 03/11/2019. Cycle # 66 FOLFIRI + Avastin was on 03/26/2019. CEA 4.7 on 03/25/2019. Cycle # 67 FOLFIRI + Avastin was on 04/09/2019. CEA 5.6 on 04/08/2019. Cycle # 68 FOLFIRI + Avastin was on 04/30/2019. CEA 4.9 on 04/29/2019. Cycle # 69 FOLFIRI + Avastin was on 05/14/2019. CEA 5.3 on 05/14/2019. CT chest 05/23/2019 stable small lung nodule with no evidence of metastatic disease. CT abdomen/pelvis 05/23/2019 Decrease conspicuity of the liver lesions. No new lesions seen. Stable splenomegaly. Continue FOLFIRI + Avastin and repeat scans in 3 months. Cycle # 70 FOLFIRI + Avastin was on 06/04/2019. CEA 4.8 on 06/03/2019. Cycle # 71 FOLFIRI + Avastin was on 06/18/2019. CEA 4.6 on 06/17/2019. Cycle # 72 FOLFIRI + Avastin was on 07/09/2019. CEA 4.3 on 07/08/2019. Cycle # 73 FOLFIRI + Avastin was on 07/30/2019. CEA 5.0 on 07/29/2019. Cycle # 74 FOLFIRI + Avastin was on 08/13/2019. CEA 5.0 on 08/12/2019. CT chest 08/20/2019 negative  CT abdomen/pelvis 08/20/2019 Previous identified hepatic lesions are not visualized on the current exam.  Continue FOLFIRI + Avastin and repeat scans in 3 months. Cycle # 75 FOLFIRI + Avastin was on 08/27/2019. CEA 5.0 on 08/26/2019. Cycle # 76 FOLFIRI + Avastin was on 09/17/2019. CEA 5.5 on 09/16/2019. Cycle # 77 FOLFIRI + Avastin was on 10/15/2019. CEA 4.6 on 10/15/2019. Cycle # 78 FOLFIRI + Avastin was on 10/29/2019. CEA 4.1 on 10/28/2019. Cycle # 79 FOLFIRI + Avastin was on 11/12/2019. CEA 4.3 on 11/12/2019. Cycle # 80 FOLFIRI + Avastin was on 12/10/2019. CEA 4.0 on 12/09/2019. CT chest 12/19/2019 Stable 3 mm nodule within the left upper lobe, similar to the previous studies dating back to 11/2/2018, however decreased in size compared to the CT from 3/26/2018. No thoracic LN.    CT abdomen/pelvis 12/19/2019 Previously described small hypodense lesions are more conspicuous on the current study compared to the recent study

## 2020-08-27 ENCOUNTER — HOSPITAL ENCOUNTER (OUTPATIENT)
Dept: INFUSION THERAPY | Age: 70
Discharge: HOME | End: 2020-08-27
Payer: MEDICARE

## 2020-08-27 DIAGNOSIS — C78.7 RECTAL CANCER METASTASIZED TO LIVER (HCC): ICD-10-CM

## 2020-08-27 DIAGNOSIS — C20 RECTAL CANCER METASTASIZED TO LIVER (HCC): ICD-10-CM

## 2020-08-27 DIAGNOSIS — Z51.11 ENCOUNTER FOR ANTINEOPLASTIC CHEMOTHERAPY: Primary | ICD-10-CM

## 2020-08-27 PROCEDURE — 96372 THER/PROPH/DIAG INJ SC/IM: CPT

## 2020-08-27 PROCEDURE — 6360000002 HC RX W HCPCS: Performed by: INTERNAL MEDICINE

## 2020-08-27 RX ADMIN — PEGFILGRASTIM 6 MG: 6 INJECTION SUBCUTANEOUS at 14:25

## 2020-09-02 ENCOUNTER — HOSPITAL ENCOUNTER (OUTPATIENT)
Dept: CT IMAGING | Age: 70
Discharge: HOME | End: 2020-09-02
Payer: MEDICARE

## 2020-09-02 PROCEDURE — 74177 CT ABD & PELVIS W/CONTRAST: CPT

## 2020-09-02 PROCEDURE — 71260 CT THORAX DX C+: CPT

## 2020-09-02 PROCEDURE — 6360000004 HC RX CONTRAST MEDICATION: Performed by: RADIOLOGY

## 2020-09-02 RX ADMIN — IOPAMIDOL 75 ML: 755 INJECTION, SOLUTION INTRAVENOUS at 14:38

## 2020-09-02 RX ADMIN — IOHEXOL 50 ML: 240 INJECTION, SOLUTION INTRATHECAL; INTRAVASCULAR; INTRAVENOUS; ORAL at 14:38

## 2020-09-07 ENCOUNTER — HOSPITAL ENCOUNTER (OUTPATIENT)
Age: 70
Discharge: HOME | End: 2020-09-07
Payer: MEDICARE

## 2020-09-07 LAB
ALBUMIN SERPL-MCNC: 3.4 G/DL (ref 3.5–5.2)
ALP BLD-CCNC: 149 U/L (ref 40–129)
ALT SERPL-CCNC: 17 U/L (ref 0–40)
ANION GAP SERPL CALCULATED.3IONS-SCNC: 12 MMOL/L (ref 7–16)
AST SERPL-CCNC: 26 U/L (ref 0–39)
BASOPHILS ABSOLUTE: 0.02 E9/L (ref 0–0.2)
BASOPHILS RELATIVE PERCENT: 1 % (ref 0–2)
BILIRUB SERPL-MCNC: 0.9 MG/DL (ref 0–1.2)
BUN BLDV-MCNC: 14 MG/DL (ref 8–23)
CALCIUM SERPL-MCNC: 8.5 MG/DL (ref 8.6–10.2)
CEA: 5.7 NG/ML (ref 0–5.2)
CHLORIDE BLD-SCNC: 105 MMOL/L (ref 98–107)
CO2: 23 MMOL/L (ref 22–29)
CREAT SERPL-MCNC: 1.1 MG/DL (ref 0.7–1.2)
EOSINOPHILS ABSOLUTE: 0.05 E9/L (ref 0.05–0.5)
EOSINOPHILS RELATIVE PERCENT: 3 % (ref 0–6)
GFR AFRICAN AMERICAN: >60
GFR NON-AFRICAN AMERICAN: >60 ML/MIN/1.73
GLUCOSE BLD-MCNC: 108 MG/DL (ref 74–99)
HCT VFR BLD CALC: 25 % (ref 37–54)
HEMOGLOBIN: 7.7 G/DL (ref 12.5–16.5)
LYMPHOCYTES ABSOLUTE: 0.37 E9/L (ref 1.5–4)
LYMPHOCYTES RELATIVE PERCENT: 23 % (ref 20–42)
MCH RBC QN AUTO: 32.9 PG (ref 26–35)
MCHC RBC AUTO-ENTMCNC: 30.8 % (ref 32–34.5)
MCV RBC AUTO: 106.8 FL (ref 80–99.9)
MONOCYTES ABSOLUTE: 0.11 E9/L (ref 0.1–0.95)
MONOCYTES RELATIVE PERCENT: 7 % (ref 2–12)
NEUTROPHILS ABSOLUTE: 1.06 E9/L (ref 1.8–7.3)
NEUTROPHILS RELATIVE PERCENT: 66 % (ref 43–80)
NUCLEATED RED BLOOD CELLS: 1 /100 WBC
PDW BLD-RTO: 18.3 FL (ref 11.5–15)
PLATELET # BLD: 71 E9/L (ref 130–450)
PLATELET CONFIRMATION: NORMAL
PMV BLD AUTO: 11.8 FL (ref 7–12)
POTASSIUM SERPL-SCNC: 4.1 MMOL/L (ref 3.5–5)
RBC # BLD: 2.34 E12/L (ref 3.8–5.8)
SODIUM BLD-SCNC: 140 MMOL/L (ref 132–146)
TOTAL PROTEIN: 6 G/DL (ref 6.4–8.3)
WBC # BLD: 1.6 E9/L (ref 4.5–11.5)

## 2020-09-07 PROCEDURE — 85025 COMPLETE CBC W/AUTO DIFF WBC: CPT

## 2020-09-07 PROCEDURE — 80053 COMPREHEN METABOLIC PANEL: CPT

## 2020-09-07 PROCEDURE — 36415 COLL VENOUS BLD VENIPUNCTURE: CPT

## 2020-09-07 PROCEDURE — 82378 CARCINOEMBRYONIC ANTIGEN: CPT

## 2020-09-08 ENCOUNTER — HOSPITAL ENCOUNTER (OUTPATIENT)
Dept: INFUSION THERAPY | Age: 70
Discharge: HOME | End: 2020-09-08
Payer: MEDICARE

## 2020-09-08 ENCOUNTER — OFFICE VISIT (OUTPATIENT)
Dept: ONCOLOGY | Age: 70
End: 2020-09-08
Payer: MEDICARE

## 2020-09-08 ENCOUNTER — TELEPHONE (OUTPATIENT)
Dept: INFUSION THERAPY | Age: 70
End: 2020-09-08

## 2020-09-08 VITALS
BODY MASS INDEX: 24.8 KG/M2 | TEMPERATURE: 97.4 F | WEIGHT: 188 LBS | DIASTOLIC BLOOD PRESSURE: 56 MMHG | SYSTOLIC BLOOD PRESSURE: 137 MMHG | HEART RATE: 60 BPM | OXYGEN SATURATION: 100 %

## 2020-09-08 PROCEDURE — 99214 OFFICE O/P EST MOD 30 MIN: CPT | Performed by: INTERNAL MEDICINE

## 2020-09-08 PROCEDURE — 99213 OFFICE O/P EST LOW 20 MIN: CPT

## 2020-09-08 NOTE — PROGRESS NOTES
No evidence of osseous metastasis. Continue same regimen and re-stage in 2-3 months. Cycle # 12 FOLFOX + Avastin was on 04/26/2016. .5 on 04/26/2016. Cycle # 13 FOLFOX + Avastin was on 05/10/2016. .3 on 05/10/2016. Cycle # 14 FOLFOX+AVASTIN was on 05/24/2016. .5 on 05/24/2016. Cycle # 15 FOLFOX + Avastin was on 06/07/2016. CEA 90.5 on 06/07/2016. Admitted to [facility] 06/13/2016-06/16/2016 for abdominal pain: EGD noted 1.5 cm clean based duodenal bulb ulceration s/p epinephrine and bicap per Dr. Shola Bolanos. No active bleeding. A. Stomach, biopsy: Mild chronic gastritis, immunostain negative for Helicobacter  B. Esophagus, biopsy: Gastric glandular mucosa with prominent intestinal metaplasia (Mad[place]'s epithelium), negative for epithelial dysplasia, esophageal squamous mucosa not identified  Cycle # 16 FOLFOX (discontinued avastin (bevacizumab) given association of peptic ulcer disease and known association of GI perforation) was on 06/21/2016. Cycle # 17 FOLFOX was on 07/05/2016. CEA 52.6 on 07/19/2016. Cycle # 17 FOLFOX was on 07/05/2016. CEA 52.6 on 07/05/2016. CEA 47.6 on 07/19/2016.     Re-staging scans 07/19/2106: CT Chest negative for metastatic disease. CT Abdomen/Pelvis: Stable hepatic lesions; Question new mural thickening in the cecum. Bone Scan: New Let hip lesion suspicious for bone metastasis.     Increased CEA; new mural thickening in the cecum and new left hip lesion suspicious for bone metastasis are consistent with disease progression; He derived maximum benefit from FOLFOX/AVASTIN. D/C FOLFOX/AVASTIN. We recommended FOLFIRI second line therapy. Cycle # 1 FOLFIRI was on 08/02/2016. CEA 40.8 on 08/02/2016. Xgeva q4 weeks started on 08/02/2016. Cycle # 2 FOLFIRI was on 08/16/2016. CEA 31.7 on 08/16/2016. Cycle # 3 FOLFIRI (added Avastin) was on 08/30/2016 given that ulcers healed on EGD 08/15/2016 by Dr. Tim Jaramillo; Protonix bid since avastin re-started.    Colonoscopy on 08/29/2016 (to look at the cecal area) unremarkable. CEA 26.7 on 08/30/2016. Cycle # 4 FOLFIRI/Avastin was on 09/13/2016. CEA 23.4 on 09/13/2016. Cycle # 5 FOLFIRI/Avastin was on 09/27/2016. CEA 22.3 on 09/27/2016. Cycle # 6 FOLFIRI/Avastin was on 10/11/2016. CEA 18.4 on 10/11/2016.      Bone scan 10/21/2016 stable bone metastasis; ? New lesion anterior left sixth rib likely interval healing fracture. CT abdomen/pelvis revealed stable hepatic metastasis. CT chest negative for metastatic disease. Continue FOLFIRI/Avastin and repeat scans in 3 months. Cycle # 7 FOLFIRI/Avastin was on 10/25/2016. CEA 16.1  Cycle # 8 FOLFIRI/Avastin was on 11/08/2016. CEA 15.7  Cycle # 9 FOLFIRI/Avastin was on 11/29/2016. CEA 13.6 on 11/29/2016. Cycle # 10 FOLFIRI/Avastin was on 12/13/2016. CEA 10.6 on 12/13/2016. Cycle # 11 FOLFIRI/Avastin was on 12/27/2016. CEA 11.1 on 12/27/2016. Cycle # 12 FOLFIRI/Avastin was on 01/10/2017. CEA 9.7 on 01/10/2017.       CT chest 01/23/2017 No new pulmonary nodule or lymphadenopathy. Patchy groundglass opacities within the left lower lobe, suggestive of infectious or inflammatory etiology. Followup CT chest in 3 months. Slight increased linear sclerosis along the left anterior sixth rib corresponding to an area of increased uptake on the prior bone scan from 10/21/2016, favored to be related to interval healing changes of a nondisplaced fracture. CT abdomen/pelvis on 01/23/2017 Redemonstration of multiple hepatic metastases, which are similar compared to the prior CT from 10/21/2016. Redemonstration of area of increased sclerosis along the right acetabulum suspicious for metastatic disease. Bone scan on 01/23/2017 Stable subtle uptake within the left proximal diaphysis, again suggestive of metastatic disease  Decreased subtle uptake within the medial right acetabulum.    Decreased uptake within the left anterior sixth rib corresponding to linear sclerosis on the recent CT, favored to be related to an joint rib fracture. Continue FOLFIRI + Avastin and repeat scans in 3 months. Cycle # 13 FOLFIRI + Avastin was on 01/24/2017. Cycle # 14 FOLFIRI + Avastin was on 02/07/2017. Cycle # 15 FOLFIRI + Avastin was on 02/21/2017. Cycle # 16 FOLFIRI + Avastin was on 03/07/2017. Cycle # 17 FOLFIRI + Avastin was on 03/21/2017. Cycle # 18 FOLFIRI + Avastin was on 04/04/2017. CT chest 04/17/2017 negative for metastatic disease. CT abdomen/pelvis 04/17/2017 Stable hypodense metastatic lesions within the liver. Stable area of increased sclerosis along the right acetabulum  Bone scan 04/17/2017 Stable subtle uptake within the left proximal femoral diaphysis; No definite abnormal uptake in the region of a sclerotic lesion along the posterior column of the right acetabulum noted on CT. Stable slight uptake within the left anterior sixth rib corresponding to linear sclerosis on the recent CT, favored to be related to a fracture. Continue FOLFIRI + Avastin and repeat scans in 3 months. Cycle # 19 FOLFIRI + Avastin was on 04/18/2017. Cycle # 20 FOLFIRI + Avastin was on 05/02/2017. Cycle # 21 FOLFIRI + Avastin was on 05/16/2017. Cycle # 22 FOLFIRI + Avastin was on 05/30/2017. Cycle # 23 FOLFIRI + Avastin was on 06/13/2017. Cycle # 24 FOLFIRI + Avastin was on 06/27/2017. Cycle # 25 FOLFIRI + Avastin was on 07/11/2017. Cycle # 26 FOLFIRI + Avastin was on 07/25/2017. Cycle # 27 FOLFIRI + Avastin was on 08/08/2017. Cycle # 28 FOLFIRI + Avastin was on 08/22/2017. Cycle # 29 FOLFIRI + Avastin was on 09/05/2017. Cycle # 30 FOLFIRI + Avastin was on 09/19/2017. Bone scan 09/26/2017 stable. CT abdomen/pelvis on 09/26/2017 Stable hypodense mass in the liver. Stable sclerotic lesion in the acetabulum. CT chest 09/26/2017 negative for metastatic disease. Cycle # 31 FOLFIRI + Avastin was on 10/03/2017. CEA 7.4 on 10/03/2017. Cycle # 32 FOLFIRI + Avastin was on 10/17/2017.  CEA 6.0 on 10/17/2017. Cycle # 33 FOLFIRI + Avastin was on 10/31/2017. CEA 6.8 on 10/31/2017. Cycle # 34 FOLFIRI + Avastin was on 11/14/2017. CEA 7.2 on 11/14/2017. Cycle # 35 FOLFIRI + Avastin was on 11/28/2017. CEA 5.1 on 11/28/2017. Cycle # 36 FOLFIRI + Avastin was on 12/12/2017. CEA 6.1 on 12/12/2017. Bone scan 12/22/2017 noted no evidence of metastatic disease to the axial or appendicular skeleton. CT chest 12/22/2017 noted no evidence of metastatic disease. CT abdomen/pelvis 12/22/2017 noted stable hypodense lesions in the liver. Continue FOLFIRI + Avastin and repeat scans in 3 months. Cycle # 37 FOLFIRI + Avastin was on 12/27/2018. Cycle # 38 FOLFIRI + Avastin was on 01/09/2018. Cycle # 39 FOLFIRI + Avastin was on 01/23/2018. Cycle # 40 FOLFIRI + Avastin was on 02/06/2018. Cycle # 41 FOLFIRI + Avastin was on 02/20/2018. Cycle # 42 FOLFIRI + Avastin was on 03/06/2018. Cycle # 43 FOLFIRI + Avastin was on 03/20/2018. Bone scan on 03/26/2018 negative for metastatic disease. CT chest 03/26/2018 noted ? new 7 mm nodule in LUCY. CT abdomen/pelvis 03/26/2018 noted stable hypodense lesions in the liver. ? New small ascites in the abdomen. Patient wants to continue to monitor the lung finding and repeat scans in 2 months instead of changing the current regimen into oral Lonsurf. Cycle # 44 FOLFIRI + Avastin was on 04/03/2018. CEA 6.3 on 04/03/2018. Cycle # 45 FOLFIRI + Avastin was on 04/24/2018. CEA 5.3 on 04/24/2018. Cycle # 46 FOLFIRI + Avastin was on 05/08/2018. CEA 5.4 on 05/08/2018. Cycle # 47 FOLFIRI + Avastin was on 05/22/2018. CEA 6.1 on 05/22/2018. CT chest 05/31/2018 noted stable nodule in LUCY. No evidence of worsening malignancy. CT abdomen/pelvis on 05/31/2018 noted stable liver metastasis. No evidence of worsening malignancy. Continue FOLFIRI + Avastin and repeat scans in 3 months. Cycle # 48 FOLFIRI + Avastin was on 06/06/2018. Cycle # 49 FOLFIRI + Avastin was on 06/19/2018. Cycle # 50 FOLFIRI + Avastin was on 07/03/2018. Cycle # 51 FOLFIRI + Avastin was on 07/24/2018. Cycle # 52 FOLFIRI + Avastin was on 08/14/2018. Cycle # 53 FOLFIRI + Avastin was on 08/28/2018. CT chest 09/06/2018 noted interval decrease in size of LUCY measuring up to 4 mm, suggestive of treatment response. No mediastinal or hilar LN  CT abdomen/pelvis 09/06/2018 Slight interval increase in size of a previously noted metastatic lesion within the right hepatic lobe. This may be related to differences in phase of enhancement compared to the most recent study from 5/31/2018, the lesion remains decreased in size compared to the more previous studies. No abdominal, retroperitoneal, or pelvic lymphadenopathy. Continue FOLFIRI + Avastin and repeat scans in 2 months. Cycle # 54 FOLFIRI + Avastin was on 09/11/2018. Cycle # 55 FOLFIRI + Avastin was on 09/25/2018. Cycle # 56 FOLFIRI + Avastin was on 10/09/2018. Cycle # 57 FOLFIRI + Avastin was on 10/23/2018. CT chest 11/02/2018 stable 3 mm nodule within LUCY. No new right and left lung nodule. No mediastinal or osseous lesion. CT abdomen/pelvis 11/02/2018 stable metastatic disease to liver. No new metastatic disease identified. No mesenteric or retroperitoneal LN. No gross colonic lesion identified. Continue FOLFIRI + Avastin and repeat scans in 3 months. Cycle # 58 FOLFIRI + Avastin was on 11/06/2018. CEA 7.6 on 11/05/2018. Cycle # 59 FOLFIRI + Avastin was on 11/27/2018. CEA 6.9 on 11/26/2018. Cycle # 60 FOLFIRI + Avastin was on 12/11/2018. CEA 6.7 on 12/11/2018. Cycle # 61 FOLFIRI + Avastin was on 01/08/2019. CEA 5.6 on 01/07/2019. Cycle # 62 FOLFIRI + Avastin was on 01/29/2019. CEA 4.6 on 01/28/2019. Cycle # 63 FOLFIRI + Avastin was on 02/12/2019. CEA 4.3 on 02/11/2019. CT chest 02/21/2019 no evidence of metastatic disease. CT abdomen/pelvis 02/21/2019 stable hypodense liver lesions. No evidence of worsening metastatic disease.  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 Ordered MRI thoracic spine and referred to neurosurgery team.    Cycle # 64 FOLFIRI + Avastin was on 02/26/2019. CEA 4.7 on 02/25/2019. Cycle # 65 FOLFIRI + Avastin was on 03/12/2019. CEA 4.0 on 03/11/2019. Cycle # 66 FOLFIRI + Avastin was on 03/26/2019. CEA 4.7 on 03/25/2019. Cycle # 67 FOLFIRI + Avastin was on 04/09/2019. CEA 5.6 on 04/08/2019. Cycle # 68 FOLFIRI + Avastin was on 04/30/2019. CEA 4.9 on 04/29/2019. Cycle # 69 FOLFIRI + Avastin was on 05/14/2019. CEA 5.3 on 05/14/2019. CT chest 05/23/2019 stable small lung nodule with no evidence of metastatic disease. CT abdomen/pelvis 05/23/2019 Decrease conspicuity of the liver lesions. No new lesions seen. Stable splenomegaly. Continue FOLFIRI + Avastin and repeat scans in 3 months. Cycle # 70 FOLFIRI + Avastin was on 06/04/2019. CEA 4.8 on 06/03/2019. Cycle # 71 FOLFIRI + Avastin was on 06/18/2019. CEA 4.6 on 06/17/2019. Cycle # 72 FOLFIRI + Avastin was on 07/09/2019. CEA 4.3 on 07/08/2019. Cycle # 73 FOLFIRI + Avastin was on 07/30/2019. CEA 5.0 on 07/29/2019. Cycle # 74 FOLFIRI + Avastin was on 08/13/2019. CEA 5.0 on 08/12/2019. CT chest 08/20/2019 negative  CT abdomen/pelvis 08/20/2019 Previous identified hepatic lesions are not visualized on the current exam.  Continue FOLFIRI + Avastin and repeat scans in 3 months. Cycle # 75 FOLFIRI + Avastin was on 08/27/2019. CEA 5.0 on 08/26/2019. Cycle # 76 FOLFIRI + Avastin was on 09/17/2019. CEA 5.5 on 09/16/2019. Cycle # 77 FOLFIRI + Avastin was on 10/15/2019. CEA 4.6 on 10/15/2019. Cycle # 78 FOLFIRI + Avastin was on 10/29/2019. CEA 4.1 on 10/28/2019. Cycle # 79 FOLFIRI + Avastin was on 11/12/2019. CEA 4.3 on 11/12/2019. Cycle # 80 FOLFIRI + Avastin was on 12/10/2019. CEA 4.0 on 12/09/2019.   CT chest 12/19/2019 Stable 3 mm nodule within the left upper lobe, similar to the previous studies dating back to 11/2/2018, however decreased in size compared to the CT from 3/26/2018. No thoracic LN. CT abdomen/pelvis 12/19/2019 Previously described small hypodense lesions are more conspicuous on the current study compared to the recent study throughout the liver from 8/20/2019, however similar to the prior study from 2/21/2019. Findings may be related to differences in contrast opacification. No abdominal or pelvic lymphadenopathy. Continue FOLFIRI + Avastin and repeat scans in 2 months to f/u on liver lesions. Cycle # 81 FOLFIRI + Avastin was on 01/07/2020. CEA 3.8 on 01/06/2020. Cycle # 82 FOLFIRI + Avastin was on 01/21/2020. CEA 3.7 on 01/20/2020. Cycle # 83 FOLFIRI + Avastin was on 02/04/2020. CEA 4.1 on 02/03/2020. Cycle # 84 FOLFIRI + Avastin was on 02/18/2020. CEA 4.6 on 02/17/2020. EGD by Dr. Erasmo Parish 03/02/2020 showing no masses or lesions  Cycle # 85 FOLFIRI + Avastin was on 03/03/2020. CEA 7.4 on 03/02/2020. Cycle # 86 FOLFIRI + Avastin was on 03/17/2020. CEA 4.5 on 03/16/2020. Colonoscopy on 03/23/2020 by Dr. Erasmo Parish unremarkable (records requested). Cycle # 87 FOLFIRI + Avastin was on 03/31/2020. CEA 4.1 on 03/30/2020. Cycle # 88 FOLFIRI + Avastin was on 04/21/2020. CEA 6.4 on 04/20/2020. Cycle # 89 FOLFIRI + Avastin was on 05/05/2020. CEA 5.8 on 05/04/2020. Cycle # 90 FOLFIRI + Avastin was on 06/02/2020. CEA 5.5 on 06/01/2020. Cycle # 91 FOLFIRI + Avastin was on 06/16/2020. CEA 5.6 on 06/15/2020. CT chest 06/23/2020 noted no metastatic disease in the chest.   CT abdomen/pelvis 06/23/2020 Stable small liver lesions measuring up to 5 mm since 2019.   22 mm round mass in segment 8 of the liver with central high density stable from December 2019), previously measuring up to 34 mm in 2017. No additional metastatic disease in the abdomen or pelvis. Small amount of ascites. Overall stable disease. Continue FOLFIRI + Avastin and repeat scans in 2-3 months. Cycle # 92 FOLFIRI + Avastin was on 07/07/2020.  CEA 5.7 on 07/06/2020. Cycle # 93 FOLFIRI + Avastin was on 07/21/2020. CEA 5.9 on 07/20/2020. Cycle # 94 FOLFIRI + Avastin was on 08/04/2020. CEA 5.5 on 08/04/2020. Cycle # 95 FOLFIRI + Avastin was on 08/25/2020. CEA 6.1 on 08/24/2020. CT chest 9/2/2020 stable unremarkable enhanced CT of the thorax. There is no metastatic disease to the lungs. CT abdomen pelvis on 9/2/2020 stable 2.2 cm low attenuated lesion within the central aspect of the right hepatic lobe favored to represent treated metastatic disease. No new hepatic lesion is identified. Stable splenomegaly  There is no appreciable colonic lesion or stricture  Pericholecystic fluid of uncertain etiology. General surgery team consulted. Cycle # 96 FOLFIRI + Avastin held today 09/08/2020 due to thrombocytopenia (PLT 71). CEA 5.7 on 09/07/2020. He presented today 09/08/2020 Cycle # 96 FOLFIRI + Avastin. No fever, chills. No chest pain or SOB. No nausea or vomiting. No diarrhea. Fatigue    Review of Systems;  CONSTITUTIONAL: No fever, chills. Fair appetite. Fatigue. ENMT: Eyes: No diplopia; Nose: No epistaxis. Mouth:No lesions  RESPIRATORY: No hemoptysis, shortness of breath. CARDIOVASCULAR: No chest pain, palpitations. GASTROINTESTINAL: No nausea/vomiting, abdominal pain. GENITOURINARY: No dysuria, urinary frequency, hematuria. NEURO: No syncope, presyncope, headache. Remainder ROS: Negative. Past Medical History:   Past Medical History               Diagnosis Date    Arthritis      Cancer ([facility] [address]         colon    Depression      Hyperlipidemia      Hypertension           Medications:  Reviewed and reconciled.     Allergies:        Allergies   Allergen Reactions    Neosporin [Neomycin-Polymyxin-Gramicidin] Rash    [facility]] Rash      Physical Exam:  BP (!) 137/56 (Site: Right Upper Arm, Position: Sitting, Cuff Size: Medium Adult)   Pulse 60   Temp 97.4 °F (36.3 °C) (Temporal)   Wt 188 lb (85.3 kg)   SpO2 100%   BMI 24.80 kg/m²   GENERAL: Alert, oriented x 3, not in acute distress  HEENT: PERRLA, EOMI. No oral lesions. NECK: Supple. Without lymphadenopathy. LUNGS: Lungs are CTA bilaterally, with no wheezing, crackles or rhonchi. CARDIOVASCULAR: Regular rhythm. No murmurs, rubs or gallops. ABDOMEN: Soft. Non-tender, non-distended. Positive bowel sounds. EXTREMITIES: Without clubbing, cyanosis or edema. NEUROLOGIC: No focal deficits. ECOG PS 1    Diagnostics:  Lab Results   Component Value Date    WBC 1.6 (L) 09/07/2020    HGB 7.7 (L) 09/07/2020    HCT 25.0 (L) 09/07/2020    .8 (H) 09/07/2020    PLT 71 (L) 09/07/2020     Lab Results   Component Value Date     09/07/2020    K 4.1 09/07/2020     09/07/2020    CO2 23 09/07/2020    BUN 14 09/07/2020    CREATININE 1.1 09/07/2020    GLUCOSE 108 (H) 09/07/2020    CALCIUM 8.5 (L) 09/07/2020    PROT 6.0 (L) 09/07/2020    LABALBU 3.4 (L) 09/07/2020    BILITOT 0.9 09/07/2020    ALKPHOS 149 (H) 09/07/2020    AST 26 09/07/2020    ALT 17 09/07/2020    LABGLOM >60 09/07/2020    GFRAA >60 09/07/2020     Lab Results   Component Value Date    CEA 5.7 (H) 09/07/2020      Impression/Plan:  68 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 KRAS Mutation: Mutation detected. BRAF Mutation: Mutation not detected. 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 CT scan abdomen/pelvis on 10/26/2015 revealed small nodules at the lung bases, extensive liver lesions consistent with metastatic rectosigmoid cancer. CEA 3488 on 10/30/2015. Bone scan on 11/10/2015 noted no metastatic disease. CT chest on 11/10/2015 revealed Multiple pulmonary nodules in the upper and lower lobes consistent with metastatic disease; Hepatic metastasis also visualized. 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 Mediport was placed. Cycle # 1 of FOLFOX + Avastin was on 11/23/2015. CEA was 4555 on 11/23/2015.   Cycle # 2 of FOLFOX + Avastin was on 12/08/2015. CEA was 3160 on 12/08/2015. Cycle # 3 of FOLFOX + Avastin was on 12/22/2015. CEA was 2516 on 12/21/2015. Cycle # 4 of FOLFOX + Avastin was on 01/05/2016. CEA was 2098 on 01/05/2015. Cycle # 5 of FOLFOX + Avastin was on 01/19/2016. CEA was 1511 on 01/05/2015.     -Bone scan on 01/26/2016 noted no metastatic disease. CT chest on 01/26/2016 revealed Significant response to treatment with no visible residual nodules. CT scan abdomen/pelvis on 01/26/2016 noted Interval decreased size of multiple masses in the liver compatible with treatment response. Continue another 2 months of FOLFOX + Avastin and repeat scans. Cycle # 6 of FOLFOX + Avastin was on 02/02/2016.  on 02/02/2016. Cycle # 7 of FOLFOX + Avastin was on 02/16/2016.  on 02/16/2016. Cycle # 8 of FOLFOX + Avastin was on 03/01/2016.  on 03/01/2016. Cycle # 9 of FOLFOX + Avastin was on 03/15/2016.  on 03/15/2016. Cycle # 10 of FOLFOX + Avastin was on 03/29/2016. .4 on 03/29/2016. Cycle # 11 of FOLFOX + Avastin was on 04/12/2016. .4 on 04/12/2016.     Re-staging scans on 04/19/2016: CT Chest: clear lungs; no evidence of recurring pulmonary nodule; CT Abdomen/Pelvis: Further interval decrease in size of the multiple metastatic hepatic lesions, the largest lesion now measures 3.9 x 3.5 cm and previously measured 4.5 cm in maximum diameter. No mesenteric lymphadenopathy is identified; Bone Scan: No evidence of osseous metastasis. Continue same regimen and re-stage in 2-3 months. Cycle # 12 FOLFOX + Avastin was on 04/26/2016. .5 on 04/26/2016. Cycle # 13 FOLFOX + Avastin was on 05/10/2016. .3 on 05/10/2016. Cycle # 14 FOLFOX+AVASTIN was on 05/24/2016. .5 on 05/24/2016. Cycle # 15 FOLFOX + Avastin was on 06/07/2016. CEA 90.5 on 06/07/2016.    Admitted to [facility] 06/13/2016-06/16/2016 for abdominal pain: EGD noted 1.5 cm clean based duodenal bulb ulceration s/p epinephrine and bicap per Dr. Jose Elias An. No active bleeding. A. Stomach, biopsy: Mild chronic gastritis, immunostain negative for Helicobacter  B. Esophagus, biopsy: Gastric glandular mucosa with prominent ntestinal metaplasia ([place]'s epithelium), negative for epithelial dysplasia, esophageal squamous mucosa not identified     Cycle # 16 FOLFOX (discontinued avastin (bevacizumab) given association of peptic ulcer disease and known association of GI perforation.) was on 06/21/2016. CEA 47 on 06/21/2016. Cycle # 17 FOLFOX was on 07/05/2016. CEA 52.6 on 07/05/2016. CEA 47.6 on 07/19/2016.     Re-staging scans 07/19/2106: CT Chest negative for metastatic disease. CT Abdomen/Pelvis: Stable hepatic lesions; Question new mural thickening in the cecum. Bone Scan: New Let hip lesion suspicious for bone metastasis.     Increased CEA; new mural thickening in the cecum and new left hip lesion suspicious for bone metastasis are consistent with disease progression; He derived maximum benefit from FOLFOX/AVASTIN. D/C FOLFOX/AVASTIN. We recommended FOLFIRI second line therapy. Cycle # 1 FOLFIRI was on 08/02/2016. CEA 40.8 on 08/02/2016. Xgeva q4 weeks started on 08/02/2016. Cycle # 2 FOLFIRI was on 08/16/2016. CEA 31.7 on 08/16/2016. Cycle # 3 FOLFIRI (added Avastin) was on 08/30/2016 given that ulcers healed on EGD 08/15/2016 by Dr. Wilma Espinoza; Protonix bid since avastin re-started. Colonoscopy on 08/29/2016 (to look at the cecal area) unremarkable. CEA 26.7 on 08/30/2016. Cycle # 4 FOLFIRI/Avastin was on 09/13/2016. CEA 23.4 on 09/13/2016. Cycle # 5 FOLFIRI/Avastin was on 09/27/2016. CEA 22.3 on 09/27/2016. Cycle # 6 FOLFIRI/Avastin was on 10/11/2016. CEA 18.4 on 10/11/2016.      Bone scan 10/21/2016 stable bone metastasis; ? New lesion anterior left sixth rib likely interval healing fracture. CT abdomen/pelvis revealed stable hepatic metastasis. CT chest negative for metastatic disease.  Continue FOLFIRI/Avastin and repeat scans in 3 months. Cycle # 7 FOLFIRI/Avastin was on 10/25/2016. CEA 16.1 on 10/25/2016. Cycle # 8 FOLFIRI/Avastin was on 11/08/2016. CEA 15.7 on 11/08/2016. Cycle # 9 FOLFIRI/Avastin was on 11/29/2016. CEA 13.6 on 11/29/2016. Cycle # 10 FOLFIRI/Avastin was on 12/13/2016. CEA 10.6 on 12/13/2016. Cycle # 11 FOLFIRI/Avastin was on 12/27/2016. CEA 11.1 on 12/27/2016. Cycle # 12 FOLFIRI/Avastin was on 01/10/2017. CEA 9.7 on 01/10/2017. CT chest 01/23/2017 No new pulmonary nodule or lymphadenopathy. Patchy groundglass opacities within the left lower lobe, suggestive of infectious or inflammatory etiology. Followup CT chest in 3 months. Slight increased linear sclerosis along the left anterior sixth rib corresponding to an area of increased uptake on the prior bone scan from 10/21/2016, favored to be related to interval healing changes of a nondisplaced fracture. CT abdomen/pelvis on 01/23/2017 Redemonstration of multiple hepatic metastases, which are similar compared to the prior CT from 10/21/2016. Redemonstration of area of increased sclerosis along the right acetabulum suspicious for metastatic disease. Bone scan on 01/23/2017 Stable subtle uptake within the left proximal diaphysis, again suggestive of metastatic disease  Decreased subtle uptake within the medial right acetabulum. Decreased uptake within the left anterior sixth rib corresponding to linear sclerosis on the recent CT, favored to be related to an joint rib fracture. Continue FOLFIRI + Avastin and repeat scans in 3 months. Cycle # 13 FOLFIRI + Avastin was on 01/24/2017. Cycle # 14 FOLFIRI + Avastin was on 02/07/2017. Cycle # 15 FOLFIRI + Avastin was on 02/21/2017. Cycle # 16 FOLFIRI + Avastin was on 03/07/2017. Cycle # 17 FOLFIRI + Avastin was on 03/21/2017. Cycle # 18 FOLFIRI + Avastin was on 04/04/2017. CT chest 04/17/2017 negative for metastatic disease.  CT abdomen/pelvis 04/17/2017 Stable hypodense stable liver metastasis. No evidence of worsening malignancy. Continue FOLFIRI + Avastin and repeat scans in 3 months. Cycle # 48 FOLFIRI + Avastin was on 06/06/2018. CEA 6.3 on 06/05/2018. Cycle # 49 FOLFIRI + Avastin was on 06/19/2018. CEA 6.1 on 06/19/2018. Cycle # 50 FOLFIRI + Avastin was on 07/03/2018. CEA 7.4 on 07/03/2018. Cycle # 51 FOLFIRI + Avastin was on 07/24/2018. CEA 6.7 on 07/24/2018. Cycle # 52 FOLFIRI + Avastin was on 08/14/2018. CEA 6.8 on 08/14/2018. Cycle # 53 FOLFIRI + Avastin was on 08/28/2018. CEA 6.5 on 08/27/2018. CT chest 09/06/2018 noted interval decrease in size of LUCY measuring up to 4 mm, suggestive of treatment response. No mediastinal or hilar LN  CT abdomen/pelvis 09/06/2018 Slight interval increase in size of a previously noted metastatic lesion within the right hepatic lobe. This may be related to differences in phase of enhancement compared to the most recent study from 5/31/2018, the lesion remains decreased in size compared to the more previous studies. No abdominal, retroperitoneal, or pelvic lymphadenopathy. Continue FOLFIRI + Avastin and repeat scans in 2 months. Cycle # 54 FOLFIRI + Avastin was on 09/11/2018. CEA 6.4 on 09/10/2018. Cycle # 55 FOLFIRI + Avastin was on 09/25/2018. CEA 6.4 on 09/25/2018. Cycle # 56 FOLFIRI + Avastin was on 10/09/2018. CEA 6.7 on 10/08/2018. Cycle # 57 FOLFIRI + Avastin was on 10/23/2018. CEA 7.2 on 10/22/2018. CT chest 11/02/2018 stable 3 mm nodule within LUCY. No new right and left lung nodule. No mediastinal or osseous lesion. CT abdomen/pelvis 11/02/2018 stable metastatic disease to liver. No new metastatic disease identified. No mesenteric or retroperitoneal LN. No gross colonic lesion identified. Continue FOLFIRI + Avastin and repeat scans in 3 months. Cycle # 58 FOLFIRI + Avastin was on 11/06/2018. CEA 7.6 on 11/05/2018. Cycle # 59 FOLFIRI + Avastin was on 11/27/2018. CEA 6.9 on 11/26/2018.    Cycle # 60 FOLFIRI + Avastin was on 12/11/2018. CEA 6.7 on 12/11/2018. Cycle # 61 FOLFIRI + Avastin was on 01/08/2019. CEA 5.6 on 01/07/2019. Cycle # 62 FOLFIRI + Avastin was on 01/29/2019. CEA 4.6 on 01/28/2019. Cycle # 63 FOLFIRI + Avastin was on 02/12/2019. CEA 4.3 on 02/11/2019. CT chest 02/21/2019 no evidence of metastatic disease. CT abdomen/pelvis 02/21/2019 stable hypodense liver lesions. No evidence of worsening metastatic disease. 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 Ordered MRI thoracic spine and referred to neurosurgery team.  Cycle # 64 FOLFIRI + Avastin was on 02/26/2019. CEA 4.7 on 02/25/2019. Cycle # 65 FOLFIRI + Avastin was on 03/12/2019. CEA 4.0 on 03/11/2019. Cycle # 66 FOLFIRI + Avastin was on 03/26/2019. CEA 4.7 on 03/25/2019. Cycle # 67 FOLFIRI + Avastin was on 04/09/2019. CEA 5.6 on 04/08/2019. Cycle # 68 FOLFIRI + Avastin was on 04/30/2019. CEA 4.9 on 04/29/2019. Cycle # 69 FOLFIRI + Avastin was on 05/14/2019. CEA 5.3 on 05/14/2019. CT chest 05/23/2019 stable small lung nodule with no evidence of metastatic disease. CT abdomen/pelvis 05/23/2019 Decrease conspicuity of the liver lesions. No new lesions seen. Stable splenomegaly. Continue FOLFIRI + Avastin and repeat scans in 3 months. Cycle # 70 FOLFIRI + Avastin was on 06/04/2019. CEA 4.8 on 06/03/2019. Cycle # 71 FOLFIRI + Avastin was on 06/18/2019. CEA 4.6 on 06/17/2019. Cycle # 72 FOLFIRI + Avastin was on 07/09/2019. CEA 4.3 on 07/08/2019. Cycle # 73 FOLFIRI + Avastin was on 07/30/2019. CEA 5.0 on 07/29/2019. Cycle # 74 FOLFIRI + Avastin was on 08/13/2019. CEA 5.0 on 08/12/2019. CT chest 08/20/2019 negative  CT abdomen/pelvis 08/20/2019 Previous identified hepatic lesions are not visualized on the current exam.  Continue FOLFIRI + Avastin and repeat scans in 3 months. Cycle # 75 FOLFIRI + Avastin was on 08/27/2019. CEA 5.0 on 08/26/2019. Cycle # 76 FOLFIRI + Avastin was on 09/17/2019.  CEA 5.5 on 09/16/2019. Cycle # 77 FOLFIRI + Avastin was on 10/15/2019. CEA 4.6 on 10/15/2019. Cycle # 78 FOLFIRI + Avastin was on 10/29/2019. CEA 4.1 on 10/28/2019. Cycle # 79 FOLFIRI + Avastin was on 11/12/2019. CEA 4.3 on 11/12/2019. Cycle # 80 FOLFIRI + Avastin was on 12/10/2019. CEA 4.0 on 12/09/2019. CT chest 12/19/2019 Stable 3 mm nodule within the left upper lobe, similar to the previous studies dating back to 11/2/2018, however decreased in size compared to the CT from 3/26/2018. No thoracic LN. CT abdomen/pelvis 12/19/2019 Previously described small hypodense lesions are more conspicuous on the current study compared to the recent study throughout the liver from 8/20/2019, however similar to the prior study from 2/21/2019. Findings may be related to differences in contrast opacification. No abdominal or pelvic lymphadenopathy. Continue FOLFIRI + Avastin and repeat scans in 2 months to f/u on liver lesions. Cycle # 81 FOLFIRI + Avastin was on 01/07/2020. CEA 3.8 on 01/06/2020. Cycle # 82 FOLFIRI + Avastin was on 01/21/2020. CEA 3.7 on 01/20/2020. Cycle # 83 FOLFIRI + Avastin was on 02/04/2020. CEA 4.1 on 02/03/2020. Cycle # 84 FOLFIRI + Avastin was on 02/18/2020. CEA 4.6 on 02/17/2020. CT chest 2/28/2020 no evidence of metastatic disease to the lungs and no mediastinal or hilar adenopathy. CT abdomen pelvis 2/28/2020 no enhancing lesions seen within the liver to suggest metastatic disease. Wall thickening of the rectosigmoid junction. Images reviewed. Continue FOLFIRI Avastin and repeat scans in 3 months  EGD by Dr. Luis Rodas 03/02/2020 showing no masses or lesions. Cycle # 85 FOLFIRI + Avastin was on 03/03/2020. CEA 7.4 on 03/02/2020. Cycle # 86 FOLFIRI + Avastin was on 03/17/2020. CEA 4.5 on 03/16/2020. Colonoscopy on 03/23/2020 by Dr. Luis aguero (records requested). Cycle # 87 FOLFIRI + Avastin was on 03/31/2020. CEA 4.1 on 03/30/2020.   Cycle # 88 FOLFIRI + Avastin was on 04/21/2020. CEA 6.4 on 04/20/2020. Cycle # 89 FOLFIRI + Avastin was on 05/05/2020. CEA 5.8 on 05/04/2020. Cycle # 90 FOLFIRI + Avastin was on 06/02/2020. CEA 5.5 on 06/01/2020. Cycle # 91 FOLFIRI + Avastin was on 06/16/2020. CEA 5.6 on 06/15/2020. CT chest 06/23/2020 noted no metastatic disease in the chest.   CT abdomen/pelvis 06/23/2020 Stable small liver lesions measuring up to 5 mm since 2019.   22 mm round mass in segment 8 of the liver with central high density stable from December 2019), previously measuring up to 34 mm in 2017. No additional metastatic disease in the abdomen or pelvis. Small amount of ascites. Overall stable disease. Continue FOLFIRI + Avastin and repeat scans in 2-3 months. Cycle # 92 FOLFIRI + Avastin was on 07/07/2020. CEA 5.7 on 07/06/2020. Cycle # 93 FOLFIRI + Avastin was on 07/21/2020. CEA 5.9 on 07/20/2020. Cycle # 94 FOLFIRI + Avastin was on 08/04/2020. CEA 5.5 on 08/04/2020. Cycle # 95 FOLFIRI + Avastin was on 08/25/2020. CEA 6.1 on 08/24/2020. CT chest 9/2/2020 stable unremarkable enhanced CT of the thorax. There is no metastatic disease to the lungs. CT abdomen pelvis on 9/2/2020 stable 2.2 cm low attenuated lesion within the central aspect of the right hepatic lobe favored to represent treated metastatic disease. No new hepatic lesion is identified. Stable splenomegaly  There is no appreciable colonic lesion or stricture. Pericholecystic fluid of uncertain etiology. General surgery team consulted for further evaluation. Cycle # 96 FOLFIRI + Avastin held today 09/08/2020 due to thrombocytopenia (PLT 71). CEA 5.7 on 09/07/2020.     RTC next week for possible Cycle # 96 FOLFIRI + Avastin  MSI testing noted no mismatch repair protein loss of expression    9/8/2020  Dewayne Oleary MD  Board Certified Medical Oncologist

## 2020-09-08 NOTE — TELEPHONE ENCOUNTER
FÁTIMA Martin [address], [phone] and spoke to Ismael with regards to Dr. Jazmín Abraham has cancelled patient's chemotherapy for today and he may have it next week. Ismael voiced understanding. Ailyn Fox

## 2020-09-11 ENCOUNTER — TELEPHONE (OUTPATIENT)
Dept: SURGERY | Age: 70
End: 2020-09-11

## 2020-09-11 ENCOUNTER — OFFICE VISIT (OUTPATIENT)
Dept: SURGERY | Age: 70
End: 2020-09-11
Payer: MEDICARE

## 2020-09-11 ENCOUNTER — TELEPHONE (OUTPATIENT)
Dept: INFUSION THERAPY | Age: 70
End: 2020-09-11

## 2020-09-11 VITALS
WEIGHT: 188 LBS | TEMPERATURE: 97.9 F | SYSTOLIC BLOOD PRESSURE: 111 MMHG | HEIGHT: 73 IN | BODY MASS INDEX: 24.92 KG/M2 | DIASTOLIC BLOOD PRESSURE: 61 MMHG | HEART RATE: 64 BPM

## 2020-09-11 PROCEDURE — 99204 OFFICE O/P NEW MOD 45 MIN: CPT | Performed by: SURGERY

## 2020-09-11 RX ORDER — CEPHALEXIN 500 MG/1
500 CAPSULE ORAL 4 TIMES DAILY
COMMUNITY
End: 2020-10-20 | Stop reason: ALTCHOICE

## 2020-09-11 RX ORDER — LACTULOSE 10 G/15ML
20 SOLUTION ORAL PRN
COMMUNITY

## 2020-09-11 RX ORDER — ACETAMINOPHEN 325 MG/1
1000 TABLET ORAL ONCE
Status: CANCELLED | OUTPATIENT
Start: 2020-09-11 | End: 2020-09-11

## 2020-09-11 RX ORDER — SODIUM CHLORIDE, SODIUM LACTATE, POTASSIUM CHLORIDE, CALCIUM CHLORIDE 600; 310; 30; 20 MG/100ML; MG/100ML; MG/100ML; MG/100ML
INJECTION, SOLUTION INTRAVENOUS CONTINUOUS
Status: CANCELLED | OUTPATIENT
Start: 2020-09-11

## 2020-09-11 RX ORDER — METRONIDAZOLE 500 MG/1
500 TABLET ORAL 3 TIMES DAILY
COMMUNITY
End: 2020-10-20 | Stop reason: ALTCHOICE

## 2020-09-11 RX ORDER — OMEPRAZOLE 20 MG/1
20 CAPSULE, DELAYED RELEASE ORAL DAILY
COMMUNITY
End: 2020-10-20

## 2020-09-11 NOTE — PROGRESS NOTES
Bob Calle  9/11/2020  [address]               History and Physical  Laparoscopic Cholecystectomy (57441)    CHIEF COMPLAINT: cholecystitis with stones    HISTORY OF PRESENT ILLNESS:Hesham Carl is a 69 y.o.  male with PMHx of rectal cancer with liver and lung mets that has resolved with chemotherapy through left chest mediport. Dr Yordan Mancera (oncology) sent pt here due to recent gallstones and gallbladder wall edema on CT scan (9/2/2020) and recommend pt follow up for cholecystectomy. Pt denies any RUQ pain and shoulder pain, but reports some numbness, tingling and neuropathy as a result from chemotherapy treatment. Pt has some flank pain and inguinal hernia that is reproducible that often goes into R scrotum without pain or concern at this time. Past Medical History:   Diagnosis Date    Arthritis     Cancer ([address]     colon    Depression     Hyperlipidemia     Hypertension      Past Surgical History:   Procedure Laterality Date    COLONOSCOPY      COLONOSCOPY  11/2/15    bx, rectal mass    FRACTURE SURGERY      KNEE ARTHROSCOPY Right     LIVER BIOPSY  11/12/15    SKIN FULL GRAFT      on finger    TONSILLECTOMY Bilateral        Home Medications  Prior to Visit Medications    Medication Sig Taking?  Authorizing Provider   omeprazole (PRILOSEC) 20 MG delayed release capsule Take 20 mg by mouth daily Yes Historical Provider, MD   cephALEXin (KEFLEX) 500 MG capsule Take 500 mg by mouth 4 times daily Yes Historical Provider, MD   metroNIDAZOLE (FLAGYL) 500 MG tablet Take 500 mg by mouth 3 times daily Yes Historical Provider, MD   lactulose (CHRONULAC) 10 GM/15ML solution Take 20 g by mouth 3 times daily Yes Historical Provider, MD   furosemide (LASIX) 20 MG tablet Take 20 mg by mouth four times a week Indications: pt is taken this med 7 days a week for now  Yes Historical Provider, MD   magnesium oxide (MAG-OX) 400 (240 Mg) MG tablet Take 1 tablet by mouth 2 times daily Yes Surinder Drake MD   potassium chloride (KLOR-CON M) 20 MEQ extended release tablet Take 20 mEq by mouth daily Yes Historical Provider, MD   loratadine (CLARITIN) 10 MG tablet Take 10 mg by mouth See Admin Instructions Takes the week of chemotherapy (Last dose: 5/9; Next dose: 5/26) Yes Historical Provider, MD   pantoprazole (PROTONIX) 40 MG tablet Take 40 mg by mouth daily Yes Historical Provider, MD   b complex vitamins capsule Take 1 capsule by mouth daily Yes Historical Provider, MD   diphenoxylate-atropine (LOMOTIL) 2.5-0.025 MG per tablet Take 1 tablet by mouth 4 times daily as needed for Diarrhea. Yes Historical Provider, MD   polyethylene glycol (GLYCOLAX) packet Take 17 g by mouth daily as needed for Constipation Yes Historical Provider, MD   hydrochlorothiazide (HYDRODIURIL) 25 MG tablet Take 1 tablet by mouth daily Yes RUBEN Rothman - CNP   ondansetron (ZOFRAN) 4 MG tablet Take 1 tablet by mouth every 8 hours as needed for Nausea or Vomiting Yes Imelda Weinberg MD   hydrocortisone (ANUSOL-HC) 2.5 % rectal cream Use 3-4 times daily. Do not use internally. External use only. Yes RUBEN Morton - CNP   acetaminophen (TYLENOL) 500 MG tablet Take 500 mg by mouth every 6 hours as needed for Pain Yes Historical Provider, MD       Allergies: Neosporin [neomycin-polymyxin-gramicidin] and Tape [adhesive tape]   Social History:   TOBACCO:   reports that he quit smoking about 19 years ago. His smoking use included cigarettes. He has a 40.00 pack-year smoking history. He has never used smokeless tobacco.  All smokers must join the free smoking cessation program and stop smoking for 3 months before having any Bariatric surgery. ETOH:    reports no history of alcohol use.        Problem Relation Age of Onset   Robert Robert Cancer Mother         breast cancer    Cancer Father         malignant brain tumor    Diabetes Brother        Review of Systems:  Psychiatric: hx of depression  Respiratory: negative, hx of lung metastasis that has resolved  Cardiovascular: negative  Gastrointestinal: negative, hx fo rectal cancer with gastric ulcer and GI bleed complications  Musculoskeletal:positive for arthralgias and back pain  All others reviewed, negative    Physical Exam:   VITALS: Blood pressure 111/61, pulse 64, temperature 97.9 °F (36.6 °C), temperature source Temporal, height 6' 1\" (1.854 m), weight 188 lb (85.3 kg). 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 Cholecystectomy with cholangiogram, lap/robotic, possible open (31122). He will go home with Acetaminophen 500 mg qid for 3 days, Oxycodone 5 mg q 8 hours prn for breakthrough pain. Discussed the risk of surgery including bleeding, infection, bile leak, common duct injury and needing further procedures. We also discussed wound infections, hernias and the risks of general anesthetic including MI, CVA, sudden death or reactions to anesthetic medications. The patient understands the risks. All questions were answered to the patient's satisfaction and they freely signed the consent.     Physician Signature: Electronically signed by Dr. Salena Moraes MD    Send copy of H&P to PCP, Rosario Mcdonald MD

## 2020-09-11 NOTE — TELEPHONE ENCOUNTER
Called Dr. Bre Clark office, [phone], and spoke to Lyssa, with regards to patient's clearance for his Cholecystectomy. Per Dr. Morelos Bis is cleared from a Hematology/Oncology standpoint but he must see his family doctor for medical clearance. \" Lyssa voiced understanding and forms faxed back to [phone], received fax confirmation.

## 2020-09-11 NOTE — TELEPHONE ENCOUNTER
Per Dr. Chelsi Garber, patient is to be scheduled for Laparoscopic cholecystectomy with IOC at [facility] once Medical + Hematology/Oncology clearance received. Clearance requests faxed to Dr. Tamara Simpson and Dr. Florencio Peoples. Patient may need to hold chemo the week of surgery. Recommendations requested from Dr. Florencio Peoples. Chart forwarded to MA to follow up. Electronically signed by Tasia Gong RN on 9/11/2020 at 10:56 AM    Clearance received from hematology/oncology Dr. Florencio Peoples. Patient will need to hold chemo before and after surgery. Dr. Florencio Peoples to review and determine how many treatments to hold.    Electronically signed by Tasia Gong RN on 9/11/2020 at 1:34 PM

## 2020-09-14 ENCOUNTER — HOSPITAL ENCOUNTER (OUTPATIENT)
Age: 70
Discharge: HOME | End: 2020-09-14
Payer: MEDICARE

## 2020-09-14 ENCOUNTER — TELEPHONE (OUTPATIENT)
Dept: INFUSION THERAPY | Age: 70
End: 2020-09-14

## 2020-09-14 LAB
ALBUMIN SERPL-MCNC: 3.3 G/DL (ref 3.5–5.2)
ALP BLD-CCNC: 151 U/L (ref 40–129)
ALT SERPL-CCNC: 17 U/L (ref 0–40)
ANION GAP SERPL CALCULATED.3IONS-SCNC: 12 MMOL/L (ref 7–16)
ANISOCYTOSIS: ABNORMAL
AST SERPL-CCNC: 29 U/L (ref 0–39)
BASOPHILS ABSOLUTE: 0 E9/L (ref 0–0.2)
BASOPHILS RELATIVE PERCENT: 0.6 % (ref 0–2)
BILIRUB SERPL-MCNC: 0.7 MG/DL (ref 0–1.2)
BUN BLDV-MCNC: 16 MG/DL (ref 8–23)
CALCIUM SERPL-MCNC: 9.3 MG/DL (ref 8.6–10.2)
CEA: 5.3 NG/ML (ref 0–5.2)
CHLORIDE BLD-SCNC: 106 MMOL/L (ref 98–107)
CO2: 25 MMOL/L (ref 22–29)
CREAT SERPL-MCNC: 1.2 MG/DL (ref 0.7–1.2)
EOSINOPHILS ABSOLUTE: 0.09 E9/L (ref 0.05–0.5)
EOSINOPHILS RELATIVE PERCENT: 2.6 % (ref 0–6)
GFR AFRICAN AMERICAN: >60
GFR NON-AFRICAN AMERICAN: 60 ML/MIN/1.73
GLUCOSE BLD-MCNC: 118 MG/DL (ref 74–99)
HCT VFR BLD CALC: 26.5 % (ref 37–54)
HEMOGLOBIN: 8.2 G/DL (ref 12.5–16.5)
LYMPHOCYTES ABSOLUTE: 0.61 E9/L (ref 1.5–4)
LYMPHOCYTES RELATIVE PERCENT: 17.4 % (ref 20–42)
MCH RBC QN AUTO: 33.2 PG (ref 26–35)
MCHC RBC AUTO-ENTMCNC: 30.9 % (ref 32–34.5)
MCV RBC AUTO: 107.3 FL (ref 80–99.9)
MONOCYTES ABSOLUTE: 0.04 E9/L (ref 0.1–0.95)
MONOCYTES RELATIVE PERCENT: 0.9 % (ref 2–12)
NEUTROPHILS ABSOLUTE: 2.84 E9/L (ref 1.8–7.3)
NEUTROPHILS RELATIVE PERCENT: 79.1 % (ref 43–80)
OVALOCYTES: ABNORMAL
PDW BLD-RTO: 18.8 FL (ref 11.5–15)
PLATELET # BLD: 97 E9/L (ref 130–450)
PLATELET CONFIRMATION: NORMAL
PMV BLD AUTO: 11.3 FL (ref 7–12)
POIKILOCYTES: ABNORMAL
POLYCHROMASIA: ABNORMAL
POTASSIUM SERPL-SCNC: 3.9 MMOL/L (ref 3.5–5)
RBC # BLD: 2.47 E12/L (ref 3.8–5.8)
SODIUM BLD-SCNC: 143 MMOL/L (ref 132–146)
TEAR DROP CELLS: ABNORMAL
TOTAL PROTEIN: 6.1 G/DL (ref 6.4–8.3)
WBC # BLD: 3.6 E9/L (ref 4.5–11.5)

## 2020-09-14 PROCEDURE — 85025 COMPLETE CBC W/AUTO DIFF WBC: CPT

## 2020-09-14 PROCEDURE — 36415 COLL VENOUS BLD VENIPUNCTURE: CPT

## 2020-09-14 PROCEDURE — 80053 COMPREHEN METABOLIC PANEL: CPT

## 2020-09-14 PROCEDURE — 82378 CARCINOEMBRYONIC ANTIGEN: CPT

## 2020-09-14 NOTE — TELEPHONE ENCOUNTER
Ramona Mcguire returned call from Dr. Messer Fearing office and stated \"We can most likely schedule patient for surgery sometime after the 22rd due to his chemotherapy was held and he will go for pre-op testing tomorrow. \" Voiced understanding.

## 2020-09-15 ENCOUNTER — HOSPITAL ENCOUNTER (OUTPATIENT)
Dept: INFUSION THERAPY | Age: 70
End: 2020-09-15
Payer: MEDICARE

## 2020-09-15 ENCOUNTER — OFFICE VISIT (OUTPATIENT)
Dept: ONCOLOGY | Age: 70
End: 2020-09-15
Payer: MEDICARE

## 2020-09-15 ENCOUNTER — HOSPITAL ENCOUNTER (OUTPATIENT)
Dept: INFUSION THERAPY | Age: 70
Discharge: HOME | End: 2020-09-15
Payer: MEDICARE

## 2020-09-15 VITALS
HEART RATE: 59 BPM | WEIGHT: 190 LBS | TEMPERATURE: 96.9 F | OXYGEN SATURATION: 100 % | DIASTOLIC BLOOD PRESSURE: 63 MMHG | BODY MASS INDEX: 25.07 KG/M2 | SYSTOLIC BLOOD PRESSURE: 119 MMHG

## 2020-09-15 DIAGNOSIS — C20 RECTAL CANCER METASTASIZED TO LIVER (HCC): Primary | ICD-10-CM

## 2020-09-15 DIAGNOSIS — C78.7 RECTAL CANCER METASTASIZED TO LIVER (HCC): Primary | ICD-10-CM

## 2020-09-15 PROCEDURE — 99212 OFFICE O/P EST SF 10 MIN: CPT

## 2020-09-15 PROCEDURE — 99214 OFFICE O/P EST MOD 30 MIN: CPT | Performed by: INTERNAL MEDICINE

## 2020-09-15 RX ORDER — SODIUM CHLORIDE 0.9 % (FLUSH) 0.9 %
10 SYRINGE (ML) INJECTION PRN
Status: DISCONTINUED | OUTPATIENT
Start: 2020-09-15 | End: 2020-09-16 | Stop reason: HOSPADM

## 2020-09-15 RX ORDER — SODIUM CHLORIDE 0.9 % (FLUSH) 0.9 %
10 SYRINGE (ML) INJECTION PRN
Status: CANCELLED | OUTPATIENT
Start: 2020-09-15

## 2020-09-15 RX ORDER — HEPARIN SODIUM (PORCINE) LOCK FLUSH IV SOLN 100 UNIT/ML 100 UNIT/ML
500 SOLUTION INTRAVENOUS PRN
Status: DISCONTINUED | OUTPATIENT
Start: 2020-09-15 | End: 2020-09-16 | Stop reason: HOSPADM

## 2020-09-15 RX ORDER — HEPARIN SODIUM (PORCINE) LOCK FLUSH IV SOLN 100 UNIT/ML 100 UNIT/ML
500 SOLUTION INTRAVENOUS PRN
Status: CANCELLED | OUTPATIENT
Start: 2020-09-15

## 2020-09-15 NOTE — PROGRESS NOTES
[place] [id number]  Attending Clinic Note     Reason for Visit: Follow-up on a patient with Metastatic Rectal Cancer.     PCP: Javid Chow MD     History of Present Illness:  70 y/o  male who was referred to see Dr. Glendy Fried (GI team) for evaluation of bright red blood per rectum, mild anemia and change in bowel habits with diarrhea. CEA 2640 on 10/19/2015. AlcP 299 AST 57 ALT 75 on 10/19/2015. Colonoscopy in 2013 noted no significant polyps, colitis or lesions at that time. Denies any Family History of colorectal cancer or polyps.     Colonoscopy on 10/19/2015 revealed:  1. Ascending polyp, 8 mm, hot snare: Tubulovillous adenoma. 2. Transverse polyp, 1 cm, hot snare: Serrated polyp most consistent with sessile serrated adenoma. 3. Five splenic flexure polyps, three - 5 mm, 7 mm, 8 mm, hot snare and biopsy: Four segments of Tubular Adenoma  4. Descending polyp, 5 mm, biopsy: Tubular adenoma. 5. Sigmoid polyp, 4 mm biopsy, Serrated polyp most consistent with sessile serrated adenoma. 6. Two rectal polyps, 4 mm biopsy: Serrated polyp most consistent with hyperplastic polyp. 7. Rectosigmoid colon mass (large mass approximately 65% circumference of the lumen; Very friable, firm and hard): Tubulovillous adenoma with associated focal erosion and fibroplasia.      CT scan abdomen/pelvis on 10/26/2015:  1. Small nodules at lung bases likely represent metastatic colon   cancer. 2. Extensive likely metastatic colon cancer throughout the liver.      3. Mild mural thickening and luminal narrowing in the   terminal ileum, otherwise nonspecific.      Colonoscopy with snare removal rectal mass was performed by Dr. Marita Preciado. Pathology proved:  Rectal polyp: Invasive adenocarcinoma involving villous adenoma and extending to the cauterized edge of excision. KRAS Mutation: Mutation detected.   BRAF Mutation: Mutation not detected. 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     CEA 3488. Bone scan on 11/10/2015 noted no metastatic disease. CT chest on 11/10/2015 revealed Multiple pulmonary nodules in the upper and lower lobes consistent with metastatic disease;   Hepatic metastasis also visualized. 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 Mediport was placed. Cycle # 1 of FOLFOX + Avastin was on 11/23/2015. CEA was 4555 on 11/23/2015. Cycle # 2 of FOLFOX + Avastin was on 12/08/2015. CEA was 3160 on 12/08/2015. Cycle # 3 of FOLFOX + Avastin was on 12/22/2015. CEA was 2516 on 12/21/2015. Cycle # 4 of FOLFOX + Avastin was on 01/05/2016. CEA was 2098 on 01/05/2015. Cycle # 5 of FOLFOX + Avastin was on 01/19/2016. CEA was 1511 on 01/05/2015.     -Bone scan on 01/26/2016 noted no metastatic disease. CT chest on 01/26/2016 revealed Significant response to treatment with no visible residual nodules. CT scan abdomen/pelvis on 01/26/2016 noted Interval decreased size of multiple masses in the liver compatible with treatment response. Continue another 2 months of FOLFOX + Avastin and repeat scans. Cycle # 6 of FOLFOX + Avastin was on 02/02/2016.  on 02/02/2016. Cycle # 7 of FOLFOX + Avastin was on 02/16/2016.  on 02/16/2016. Cycle # 8 of FOLFOX + Avastin was on 03/01/2016.  on 03/01/2016. Cycle # 9 of FOLFOX + Avastin was on 03/15/2016.  on 03/15/2016. Cycle # 10 of FOLFOX + Avastin was on 03/29/2016. .4 on 03/29/2016. Cycle # 11 of FOLFOX + Avastin was on 04/12/2016. .4 on 04/12/2016.     Re-staging scans on 04/19/2016: CT Chest: clear lungs; no evidence of recurring pulmonary nodule; CT Abdomen/Pelvis: Further interval decrease in size of the multiple metastatic hepatic lesions, the largest lesion now measures 3.9 x 3.5 cm and previously measured 4.5 cm in maximum diameter.  No mesenteric lymphadenopathy is identified; Bone Scan: No evidence of osseous metastasis. Continue same regimen and re-stage in 2-3 months. Cycle # 12 FOLFOX + Avastin was on 04/26/2016. .5 on 04/26/2016. Cycle # 13 FOLFOX + Avastin was on 05/10/2016. .3 on 05/10/2016. Cycle # 14 FOLFOX+AVASTIN was on 05/24/2016. .5 on 05/24/2016. Cycle # 15 FOLFOX + Avastin was on 06/07/2016. CEA 90.5 on 06/07/2016. Admitted to [facility] 06/13/2016-06/16/2016 for abdominal pain: EGD noted 1.5 cm clean based duodenal bulb ulceration s/p epinephrine and bicap per Dr. Powers Cover. No active bleeding. A. Stomach, biopsy: Mild chronic gastritis, immunostain negative for Helicobacter  B. Esophagus, biopsy: Gastric glandular mucosa with prominent intestinal metaplasia ([place]'s epithelium), negative for epithelial dysplasia, esophageal squamous mucosa not identified  Cycle # 16 FOLFOX (discontinued avastin (bevacizumab) given association of peptic ulcer disease and known association of GI perforation) was on 06/21/2016. Cycle # 17 FOLFOX was on 07/05/2016. CEA 52.6 on 07/19/2016. Cycle # 17 FOLFOX was on 07/05/2016. CEA 52.6 on 07/05/2016. CEA 47.6 on 07/19/2016.     Re-staging scans 07/19/2106: CT Chest negative for metastatic disease. CT Abdomen/Pelvis: Stable hepatic lesions; Question new mural thickening in the cecum. Bone Scan: New Let hip lesion suspicious for bone metastasis.     Increased CEA; new mural thickening in the cecum and new left hip lesion suspicious for bone metastasis are consistent with disease progression; He derived maximum benefit from FOLFOX/AVASTIN. D/C FOLFOX/AVASTIN. We recommended FOLFIRI second line therapy. Cycle # 1 FOLFIRI was on 08/02/2016. CEA 40.8 on 08/02/2016. Xgeva q4 weeks started on 08/02/2016. Cycle # 2 FOLFIRI was on 08/16/2016. CEA 31.7 on 08/16/2016. Cycle # 3 FOLFIRI (added Avastin) was on 08/30/2016 given that ulcers healed on EGD 08/15/2016 by Dr. Amador Burnett; Protonix bid since avastin re-started.    Colonoscopy on 08/29/2016 (to look at the cecal area) unremarkable. CEA 26.7 on 08/30/2016. Cycle # 4 FOLFIRI/Avastin was on 09/13/2016. CEA 23.4 on 09/13/2016. Cycle # 5 FOLFIRI/Avastin was on 09/27/2016. CEA 22.3 on 09/27/2016. Cycle # 6 FOLFIRI/Avastin was on 10/11/2016. CEA 18.4 on 10/11/2016.      Bone scan 10/21/2016 stable bone metastasis; ? New lesion anterior left sixth rib likely interval healing fracture. CT abdomen/pelvis revealed stable hepatic metastasis. CT chest negative for metastatic disease. Continue FOLFIRI/Avastin and repeat scans in 3 months. Cycle # 7 FOLFIRI/Avastin was on 10/25/2016. CEA 16.1  Cycle # 8 FOLFIRI/Avastin was on 11/08/2016. CEA 15.7  Cycle # 9 FOLFIRI/Avastin was on 11/29/2016. CEA 13.6 on 11/29/2016. Cycle # 10 FOLFIRI/Avastin was on 12/13/2016. CEA 10.6 on 12/13/2016. Cycle # 11 FOLFIRI/Avastin was on 12/27/2016. CEA 11.1 on 12/27/2016. Cycle # 12 FOLFIRI/Avastin was on 01/10/2017. CEA 9.7 on 01/10/2017.       CT chest 01/23/2017 No new pulmonary nodule or lymphadenopathy. Patchy groundglass opacities within the left lower lobe, suggestive of infectious or inflammatory etiology. Followup CT chest in 3 months. Slight increased linear sclerosis along the left anterior sixth rib corresponding to an area of increased uptake on the prior bone scan from 10/21/2016, favored to be related to interval healing changes of a nondisplaced fracture. CT abdomen/pelvis on 01/23/2017 Redemonstration of multiple hepatic metastases, which are similar compared to the prior CT from 10/21/2016. Redemonstration of area of increased sclerosis along the right acetabulum suspicious for metastatic disease. Bone scan on 01/23/2017 Stable subtle uptake within the left proximal diaphysis, again suggestive of metastatic disease  Decreased subtle uptake within the medial right acetabulum.    Decreased uptake within the left anterior sixth rib corresponding to linear sclerosis on the recent CT, favored to be related to an joint rib fracture. Continue FOLFIRI + Avastin and repeat scans in 3 months. Cycle # 13 FOLFIRI + Avastin was on 01/24/2017. Cycle # 14 FOLFIRI + Avastin was on 02/07/2017. Cycle # 15 FOLFIRI + Avastin was on 02/21/2017. Cycle # 16 FOLFIRI + Avastin was on 03/07/2017. Cycle # 17 FOLFIRI + Avastin was on 03/21/2017. Cycle # 18 FOLFIRI + Avastin was on 04/04/2017. CT chest 04/17/2017 negative for metastatic disease. CT abdomen/pelvis 04/17/2017 Stable hypodense metastatic lesions within the liver. Stable area of increased sclerosis along the right acetabulum  Bone scan 04/17/2017 Stable subtle uptake within the left proximal femoral diaphysis; No definite abnormal uptake in the region of a sclerotic lesion along the posterior column of the right acetabulum noted on CT. Stable slight uptake within the left anterior sixth rib corresponding to linear sclerosis on the recent CT, favored to be related to a fracture. Continue FOLFIRI + Avastin and repeat scans in 3 months. Cycle # 19 FOLFIRI + Avastin was on 04/18/2017. Cycle # 20 FOLFIRI + Avastin was on 05/02/2017. Cycle # 21 FOLFIRI + Avastin was on 05/16/2017. Cycle # 22 FOLFIRI + Avastin was on 05/30/2017. Cycle # 23 FOLFIRI + Avastin was on 06/13/2017. Cycle # 24 FOLFIRI + Avastin was on 06/27/2017. Cycle # 25 FOLFIRI + Avastin was on 07/11/2017. Cycle # 26 FOLFIRI + Avastin was on 07/25/2017. Cycle # 27 FOLFIRI + Avastin was on 08/08/2017. Cycle # 28 FOLFIRI + Avastin was on 08/22/2017. Cycle # 29 FOLFIRI + Avastin was on 09/05/2017. Cycle # 30 FOLFIRI + Avastin was on 09/19/2017. Bone scan 09/26/2017 stable. CT abdomen/pelvis on 09/26/2017 Stable hypodense mass in the liver. Stable sclerotic lesion in the acetabulum. CT chest 09/26/2017 negative for metastatic disease. Cycle # 31 FOLFIRI + Avastin was on 10/03/2017. CEA 7.4 on 10/03/2017. Cycle # 32 FOLFIRI + Avastin was on 10/17/2017.  CEA 6.0 on 10/17/2017. Cycle # 33 FOLFIRI + Avastin was on 10/31/2017. CEA 6.8 on 10/31/2017. Cycle # 34 FOLFIRI + Avastin was on 11/14/2017. CEA 7.2 on 11/14/2017. Cycle # 35 FOLFIRI + Avastin was on 11/28/2017. CEA 5.1 on 11/28/2017. Cycle # 36 FOLFIRI + Avastin was on 12/12/2017. CEA 6.1 on 12/12/2017. Bone scan 12/22/2017 noted no evidence of metastatic disease to the axial or appendicular skeleton. CT chest 12/22/2017 noted no evidence of metastatic disease. CT abdomen/pelvis 12/22/2017 noted stable hypodense lesions in the liver. Continue FOLFIRI + Avastin and repeat scans in 3 months. Cycle # 37 FOLFIRI + Avastin was on 12/27/2018. Cycle # 38 FOLFIRI + Avastin was on 01/09/2018. Cycle # 39 FOLFIRI + Avastin was on 01/23/2018. Cycle # 40 FOLFIRI + Avastin was on 02/06/2018. Cycle # 41 FOLFIRI + Avastin was on 02/20/2018. Cycle # 42 FOLFIRI + Avastin was on 03/06/2018. Cycle # 43 FOLFIRI + Avastin was on 03/20/2018. Bone scan on 03/26/2018 negative for metastatic disease. CT chest 03/26/2018 noted ? new 7 mm nodule in LUCY. CT abdomen/pelvis 03/26/2018 noted stable hypodense lesions in the liver. ? New small ascites in the abdomen. Patient wants to continue to monitor the lung finding and repeat scans in 2 months instead of changing the current regimen into oral Lonsurf. Cycle # 44 FOLFIRI + Avastin was on 04/03/2018. CEA 6.3 on 04/03/2018. Cycle # 45 FOLFIRI + Avastin was on 04/24/2018. CEA 5.3 on 04/24/2018. Cycle # 46 FOLFIRI + Avastin was on 05/08/2018. CEA 5.4 on 05/08/2018. Cycle # 47 FOLFIRI + Avastin was on 05/22/2018. CEA 6.1 on 05/22/2018. CT chest 05/31/2018 noted stable nodule in LUCY. No evidence of worsening malignancy. CT abdomen/pelvis on 05/31/2018 noted stable liver metastasis. No evidence of worsening malignancy. Continue FOLFIRI + Avastin and repeat scans in 3 months. Cycle # 48 FOLFIRI + Avastin was on 06/06/2018. Cycle # 49 FOLFIRI + Avastin was on 06/19/2018. Cycle # 50 FOLFIRI + Avastin was on 07/03/2018. Cycle # 51 FOLFIRI + Avastin was on 07/24/2018. Cycle # 52 FOLFIRI + Avastin was on 08/14/2018. Cycle # 53 FOLFIRI + Avastin was on 08/28/2018. CT chest 09/06/2018 noted interval decrease in size of LUCY measuring up to 4 mm, suggestive of treatment response. No mediastinal or hilar LN  CT abdomen/pelvis 09/06/2018 Slight interval increase in size of a previously noted metastatic lesion within the right hepatic lobe. This may be related to differences in phase of enhancement compared to the most recent study from 5/31/2018, the lesion remains decreased in size compared to the more previous studies. No abdominal, retroperitoneal, or pelvic lymphadenopathy. Continue FOLFIRI + Avastin and repeat scans in 2 months. Cycle # 54 FOLFIRI + Avastin was on 09/11/2018. Cycle # 55 FOLFIRI + Avastin was on 09/25/2018. Cycle # 56 FOLFIRI + Avastin was on 10/09/2018. Cycle # 57 FOLFIRI + Avastin was on 10/23/2018. CT chest 11/02/2018 stable 3 mm nodule within LUCY. No new right and left lung nodule. No mediastinal or osseous lesion. CT abdomen/pelvis 11/02/2018 stable metastatic disease to liver. No new metastatic disease identified. No mesenteric or retroperitoneal LN. No gross colonic lesion identified. Continue FOLFIRI + Avastin and repeat scans in 3 months. Cycle # 58 FOLFIRI + Avastin was on 11/06/2018. CEA 7.6 on 11/05/2018. Cycle # 59 FOLFIRI + Avastin was on 11/27/2018. CEA 6.9 on 11/26/2018. Cycle # 60 FOLFIRI + Avastin was on 12/11/2018. CEA 6.7 on 12/11/2018. Cycle # 61 FOLFIRI + Avastin was on 01/08/2019. CEA 5.6 on 01/07/2019. Cycle # 62 FOLFIRI + Avastin was on 01/29/2019. CEA 4.6 on 01/28/2019. Cycle # 63 FOLFIRI + Avastin was on 02/12/2019. CEA 4.3 on 02/11/2019. CT chest 02/21/2019 no evidence of metastatic disease. CT abdomen/pelvis 02/21/2019 stable hypodense liver lesions. No evidence of worsening metastatic disease.  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 Ordered MRI thoracic spine and referred to neurosurgery team.    Cycle # 64 FOLFIRI + Avastin was on 02/26/2019. CEA 4.7 on 02/25/2019. Cycle # 65 FOLFIRI + Avastin was on 03/12/2019. CEA 4.0 on 03/11/2019. Cycle # 66 FOLFIRI + Avastin was on 03/26/2019. CEA 4.7 on 03/25/2019. Cycle # 67 FOLFIRI + Avastin was on 04/09/2019. CEA 5.6 on 04/08/2019. Cycle # 68 FOLFIRI + Avastin was on 04/30/2019. CEA 4.9 on 04/29/2019. Cycle # 69 FOLFIRI + Avastin was on 05/14/2019. CEA 5.3 on 05/14/2019. CT chest 05/23/2019 stable small lung nodule with no evidence of metastatic disease. CT abdomen/pelvis 05/23/2019 Decrease conspicuity of the liver lesions. No new lesions seen. Stable splenomegaly. Continue FOLFIRI + Avastin and repeat scans in 3 months. Cycle # 70 FOLFIRI + Avastin was on 06/04/2019. CEA 4.8 on 06/03/2019. Cycle # 71 FOLFIRI + Avastin was on 06/18/2019. CEA 4.6 on 06/17/2019. Cycle # 72 FOLFIRI + Avastin was on 07/09/2019. CEA 4.3 on 07/08/2019. Cycle # 73 FOLFIRI + Avastin was on 07/30/2019. CEA 5.0 on 07/29/2019. Cycle # 74 FOLFIRI + Avastin was on 08/13/2019. CEA 5.0 on 08/12/2019. CT chest 08/20/2019 negative  CT abdomen/pelvis 08/20/2019 Previous identified hepatic lesions are not visualized on the current exam.  Continue FOLFIRI + Avastin and repeat scans in 3 months. Cycle # 75 FOLFIRI + Avastin was on 08/27/2019. CEA 5.0 on 08/26/2019. Cycle # 76 FOLFIRI + Avastin was on 09/17/2019. CEA 5.5 on 09/16/2019. Cycle # 77 FOLFIRI + Avastin was on 10/15/2019. CEA 4.6 on 10/15/2019. Cycle # 78 FOLFIRI + Avastin was on 10/29/2019. CEA 4.1 on 10/28/2019. Cycle # 79 FOLFIRI + Avastin was on 11/12/2019. CEA 4.3 on 11/12/2019. Cycle # 80 FOLFIRI + Avastin was on 12/10/2019. CEA 4.0 on 12/09/2019.   CT chest 12/19/2019 Stable 3 mm nodule within the left upper lobe, similar to the previous studies dating back to 11/2/2018, however decreased in size compared to the CT from 3/26/2018. No thoracic LN. CT abdomen/pelvis 12/19/2019 Previously described small hypodense lesions are more conspicuous on the current study compared to the recent study throughout the liver from 8/20/2019, however similar to the prior study from 2/21/2019. Findings may be related to differences in contrast opacification. No abdominal or pelvic lymphadenopathy. Continue FOLFIRI + Avastin and repeat scans in 2 months to f/u on liver lesions. Cycle # 81 FOLFIRI + Avastin was on 01/07/2020. CEA 3.8 on 01/06/2020. Cycle # 82 FOLFIRI + Avastin was on 01/21/2020. CEA 3.7 on 01/20/2020. Cycle # 83 FOLFIRI + Avastin was on 02/04/2020. CEA 4.1 on 02/03/2020. Cycle # 84 FOLFIRI + Avastin was on 02/18/2020. CEA 4.6 on 02/17/2020. EGD by Dr. Demar Leonard 03/02/2020 showing no masses or lesions  Cycle # 85 FOLFIRI + Avastin was on 03/03/2020. CEA 7.4 on 03/02/2020. Cycle # 86 FOLFIRI + Avastin was on 03/17/2020. CEA 4.5 on 03/16/2020. Colonoscopy on 03/23/2020 by Dr. Demar Leonard unremarkable (records requested). Cycle # 87 FOLFIRI + Avastin was on 03/31/2020. CEA 4.1 on 03/30/2020. Cycle # 88 FOLFIRI + Avastin was on 04/21/2020. CEA 6.4 on 04/20/2020. Cycle # 89 FOLFIRI + Avastin was on 05/05/2020. CEA 5.8 on 05/04/2020. Cycle # 90 FOLFIRI + Avastin was on 06/02/2020. CEA 5.5 on 06/01/2020. Cycle # 91 FOLFIRI + Avastin was on 06/16/2020. CEA 5.6 on 06/15/2020. CT chest 06/23/2020 noted no metastatic disease in the chest.   CT abdomen/pelvis 06/23/2020 Stable small liver lesions measuring up to 5 mm since 2019.   22 mm round mass in segment 8 of the liver with central high density stable from December 2019), previously measuring up to 34 mm in 2017. No additional metastatic disease in the abdomen or pelvis. Small amount of ascites. Overall stable disease. Continue FOLFIRI + Avastin and repeat scans in 2-3 months. Cycle # 92 FOLFIRI + Avastin was on 07/07/2020.  CEA 5.7 on 07/06/2020. Cycle # 93 FOLFIRI + Avastin was on 07/21/2020. CEA 5.9 on 07/20/2020. Cycle # 94 FOLFIRI + Avastin was on 08/04/2020. CEA 5.5 on 08/04/2020. Cycle # 95 FOLFIRI + Avastin was on 08/25/2020. CEA 6.1 on 08/24/2020. CT chest 9/2/2020 stable unremarkable enhanced CT of the thorax. There is no metastatic disease to the lungs. CT abdomen pelvis on 9/2/2020 stable 2.2 cm low attenuated lesion within the central aspect of the right hepatic lobe favored to represent treated metastatic disease. No new hepatic lesion is identified. Stable splenomegaly  There is no appreciable colonic lesion or stricture  Pericholecystic fluid of uncertain etiology. General surgery team consulted. General surgery team on board. Plan for Cholecystectomy with cholangiogram, lap/robotic, possible open. Cleared from Heme/Onc standpoint. No fever, chills. No chest pain or SOB. No nausea or vomiting. No diarrhea. Fatigue    Review of Systems;  CONSTITUTIONAL: No fever, chills. Fair appetite. Fatigue. ENMT: Eyes: No diplopia; Nose: No epistaxis. Mouth:No lesions  RESPIRATORY: No hemoptysis, shortness of breath. CARDIOVASCULAR: No chest pain, palpitations. GASTROINTESTINAL: No nausea/vomiting, abdominal pain. GENITOURINARY: No dysuria, urinary frequency, hematuria. NEURO: No syncope, presyncope, headache. Remainder ROS: Negative. Past Medical History:   Past Medical History               Diagnosis Date    Arthritis      Cancer ([facility] [address]         colon    Depression      Hyperlipidemia      Hypertension           Medications:  Reviewed and reconciled.     Allergies:        Allergies   Allergen Reactions    Neosporin [Neomycin-Polymyxin-Gramicidin] Rash    Tape Asuncion Dy Tape] Rash      Physical Exam:  /63 (Site: Right Upper Arm, Position: Sitting, Cuff Size: Large Adult)   Pulse 59   Temp 96.9 °F (36.1 °C) (Temporal)   Wt 190 lb (86.2 kg)   SpO2 100%   BMI 25.07 kg/m²   GENERAL: Alert, oriented x 3, not in acute distress  HEENT: PERRLA, EOMI. No oral lesions. NECK: Supple. Without lymphadenopathy. LUNGS: Lungs are CTA bilaterally, with no wheezing, crackles or rhonchi. CARDIOVASCULAR: Regular rhythm. No murmurs, rubs or gallops. ABDOMEN: Soft. Non-tender, non-distended. Positive bowel sounds. EXTREMITIES: Without clubbing, cyanosis or edema. NEUROLOGIC: No focal deficits. ECOG PS 1    Diagnostics:  Lab Results   Component Value Date    WBC 3.6 (L) 09/14/2020    HGB 8.2 (L) 09/14/2020    HCT 26.5 (L) 09/14/2020    .3 (H) 09/14/2020    PLT 97 (L) 09/14/2020     Lab Results   Component Value Date     09/14/2020    K 3.9 09/14/2020     09/14/2020    CO2 25 09/14/2020    BUN 16 09/14/2020    CREATININE 1.2 09/14/2020    GLUCOSE 118 (H) 09/14/2020    CALCIUM 9.3 09/14/2020    PROT 6.1 (L) 09/14/2020    LABALBU 3.3 (L) 09/14/2020    BILITOT 0.7 09/14/2020    ALKPHOS 151 (H) 09/14/2020    AST 29 09/14/2020    ALT 17 09/14/2020    LABGLOM 60 09/14/2020    GFRAA >60 09/14/2020     Lab Results   Component Value Date    CEA 5.3 (H) 09/14/2020      Impression/Plan:  68 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 KRAS Mutation: Mutation detected. BRAF Mutation: Mutation not detected. 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 CT scan abdomen/pelvis on 10/26/2015 revealed small nodules at the lung bases, extensive liver lesions consistent with metastatic rectosigmoid cancer. CEA 3488 on 10/30/2015. Bone scan on 11/10/2015 noted no metastatic disease. CT chest on 11/10/2015 revealed Multiple pulmonary nodules in the upper and lower lobes consistent with metastatic disease; Hepatic metastasis also visualized. 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 Mediport was placed. Cycle # 1 of FOLFOX + Avastin was on 11/23/2015. CEA was 4555 on 11/23/2015. Cycle # 2 of FOLFOX + Avastin was on 12/08/2015. CEA was 3160 on 12/08/2015. Cycle # 3 of FOLFOX + Avastin was on 12/22/2015. CEA was 2516 on 12/21/2015. Cycle # 4 of FOLFOX + Avastin was on 01/05/2016. CEA was 2098 on 01/05/2015. Cycle # 5 of FOLFOX + Avastin was on 01/19/2016. CEA was 1511 on 01/05/2015.     -Bone scan on 01/26/2016 noted no metastatic disease. CT chest on 01/26/2016 revealed Significant response to treatment with no visible residual nodules. CT scan abdomen/pelvis on 01/26/2016 noted Interval decreased size of multiple masses in the liver compatible with treatment response. Continue another 2 months of FOLFOX + Avastin and repeat scans. Cycle # 6 of FOLFOX + Avastin was on 02/02/2016.  on 02/02/2016. Cycle # 7 of FOLFOX + Avastin was on 02/16/2016.  on 02/16/2016. Cycle # 8 of FOLFOX + Avastin was on 03/01/2016.  on 03/01/2016. Cycle # 9 of FOLFOX + Avastin was on 03/15/2016.  on 03/15/2016. Cycle # 10 of FOLFOX + Avastin was on 03/29/2016. .4 on 03/29/2016. Cycle # 11 of FOLFOX + Avastin was on 04/12/2016. .4 on 04/12/2016.     Re-staging scans on 04/19/2016: CT Chest: clear lungs; no evidence of recurring pulmonary nodule; CT Abdomen/Pelvis: Further interval decrease in size of the multiple metastatic hepatic lesions, the largest lesion now measures 3.9 x 3.5 cm and previously measured 4.5 cm in maximum diameter. No mesenteric lymphadenopathy is identified; Bone Scan: No evidence of osseous metastasis. Continue same regimen and re-stage in 2-3 months. Cycle # 12 FOLFOX + Avastin was on 04/26/2016. .5 on 04/26/2016. Cycle # 13 FOLFOX + Avastin was on 05/10/2016. .3 on 05/10/2016. Cycle # 14 FOLFOX+AVASTIN was on 05/24/2016. .5 on 05/24/2016. Cycle # 15 FOLFOX + Avastin was on 06/07/2016. CEA 90.5 on 06/07/2016. Admitted to [facility] 06/13/2016-06/16/2016 for abdominal pain: EGD noted 1.5 cm clean based duodenal bulb ulceration s/p epinephrine and bicap per Dr. Lynette Contreras.  No FOLFIRI/Avastin was on 10/25/2016. CEA 16.1 on 10/25/2016. Cycle # 8 FOLFIRI/Avastin was on 11/08/2016. CEA 15.7 on 11/08/2016. Cycle # 9 FOLFIRI/Avastin was on 11/29/2016. CEA 13.6 on 11/29/2016. Cycle # 10 FOLFIRI/Avastin was on 12/13/2016. CEA 10.6 on 12/13/2016. Cycle # 11 FOLFIRI/Avastin was on 12/27/2016. CEA 11.1 on 12/27/2016. Cycle # 12 FOLFIRI/Avastin was on 01/10/2017. CEA 9.7 on 01/10/2017. CT chest 01/23/2017 No new pulmonary nodule or lymphadenopathy. Patchy groundglass opacities within the left lower lobe, suggestive of infectious or inflammatory etiology. Followup CT chest in 3 months. Slight increased linear sclerosis along the left anterior sixth rib corresponding to an area of increased uptake on the prior bone scan from 10/21/2016, favored to be related to interval healing changes of a nondisplaced fracture. CT abdomen/pelvis on 01/23/2017 Redemonstration of multiple hepatic metastases, which are similar compared to the prior CT from 10/21/2016. Redemonstration of area of increased sclerosis along the right acetabulum suspicious for metastatic disease. Bone scan on 01/23/2017 Stable subtle uptake within the left proximal diaphysis, again suggestive of metastatic disease  Decreased subtle uptake within the medial right acetabulum. Decreased uptake within the left anterior sixth rib corresponding to linear sclerosis on the recent CT, favored to be related to an joint rib fracture. Continue FOLFIRI + Avastin and repeat scans in 3 months. Cycle # 13 FOLFIRI + Avastin was on 01/24/2017. Cycle # 14 FOLFIRI + Avastin was on 02/07/2017. Cycle # 15 FOLFIRI + Avastin was on 02/21/2017. Cycle # 16 FOLFIRI + Avastin was on 03/07/2017. Cycle # 17 FOLFIRI + Avastin was on 03/21/2017. Cycle # 18 FOLFIRI + Avastin was on 04/04/2017. CT chest 04/17/2017 negative for metastatic disease. CT abdomen/pelvis 04/17/2017 Stable hypodense metastatic lesions within the liver. Stable area of increased sclerosis along the right acetabulum  Bone scan 04/17/2017 Stable subtle uptake within the left proximal femoral diaphysis; No definite abnormal uptake in the region of a sclerotic lesion along the posterior column of the right acetabulum noted on CT. Stable slight uptake within the left anterior sixth rib corresponding to linear sclerosis on the recent CT, favored to be related to a fracture. Continue FOLFIRI + Avastin and repeat scans in 3 months. Cycle # 19 FOLFIRI + Avastin was on 04/18/2017. CEA 7.5 on 04/18/2017. Cycle # 20 FOLFIRI + Avastin was on 05/02/2017. CEA 7.7 on 05/02/2017. Cycle # 21 FOLFIRI + Avastin was on 05/16/2017. CEA 7.1 on 05/16/2017. Cycle # 22 FOLFIRI + Avastin was on 05/30/2017. CEA 8.2 on 05/30/2017. Cycle # 23 FOLFIRI + Avastin was on 06/13/2017. CEA 7.7 on 06/13/2017. Cycle # 24 FOLFIRI + Avastin was on 06/27/2017. CEA 6.6 on 06/27/2017. Re-staging scans 07/05/2017:  Bone scan stable proximal left femoral diaphysis, right acetabulum, and left anterior sixth rib lesions;  CT abdomen/pelvis stable hypodense metastatic lesions within the liver; CT chest without convincing evidence of metastatic disease. Cycle # 25 FOLFIRI + Avastin was on 07/11/2017. CEA 6.3 on 07/11/2017. Cycle # 26 FOLFIRI + Avastin was on 07/25/2017. CEA 7.3 on 07/25/2017. Cycle # 27 FOLFIRI + Avastin was on 08/08/2017. CEA 6.5 on 08/08/2017. Cycle # 28 FOLFIRI + Avastin was on 08/22/2017. CEA 5.9 on 08/22/2017. Cycle # 29 FOLFIRI + Avastin was on 09/05/2017. CEA 6.3 on 09/05/2017. Cycle # 30 FOLFIRI + Avastin was on 09/19/2017. CEA 6.3 on 09/19/2017. Bone scan 09/26/2017 stable. CT abdomen/pelvis on 09/26/2017 Stable hypodense mass in the liver. Stable sclerotic lesion in the acetabulum. CT chest 09/26/2017 negative for metastatic disease. Cycle # 31 FOLFIRI + Avastin was on 10/03/2017. CEA 7.4 on 10/03/2017. Cycle # 32 FOLFIRI + Avastin was on 10/17/2017. CEA 6.0 on 10/17/2017. Cycle # 33 FOLFIRI + Avastin was on 10/31/2017. CEA 6.8 on 10/31/2017. Cycle # 34 FOLFIRI + Avastin was on 11/14/2017. CEA 7.2 on 11/14/2017. Cycle # 35 FOLFIRI + Avastin was on 11/28/2017. CEA 5.1 on 11/28/2017. Cycle # 36 FOLFIRI + Avastin was on 12/12/2017. CEA 6.1 on 12/12/2017. Bone scan 12/22/2017 noted no evidence of metastatic disease to the axial or appendicular skeleton. CT chest 12/22/2017 noted no evidence of metastatic disease. CT abdomen/pelvis 12/22/2017 noted stable hypodense lesions in the liver. Continue FOLFIRI + Avastin and repeat scans in 3 months. Cycle # 37 FOLFIRI + Avastin was on 12/27/2018. CEA 6.7 on 12/27/2017. Cycle # 38 FOLFIRI + Avastin was on 01/09/2018. CEA 5.0 on 01/09/2018. Cycle # 39 FOLFIRI + Avastin was on 01/23/2018. CEA 6.5 on 01/23/2018. Cycle # 40 FOLFIRI + Avastin was on 02/06/2018. CEA 6.9 on 02/06/2018. Cycle # 41 FOLFIRI + Avastin was on 02/20/2018. CEA 6.4 on 02/20/2018. Cycle # 42 FOLFIRI + Avastin was on 03/06/2018. CEA 6.6 on 03/06/2018. Cycle # 43 FOLFIRI + Avastin was on 03/20/2018. CEA 5.7 on 03/20/2018. Bone scan on 03/26/2018 negative for metastatic disease. CT chest 03/26/2018 noted ? new 7 mm nodule in LUCY. CT abdomen/pelvis 03/26/2018 noted stable hypodense lesions in the liver. ? New small ascites in the abdomen. Patient wants to continue to monitor the lung finding and repeat scans in 2 months instead of changing the current chemotherapy regimen to oral Lonsurf. Cycle # 44 FOLFIRI + Avastin was on 04/03/2018. CEA 6.3 on 04/03/2018. Cycle # 45 FOLFIRI + Avastin was on 04/24/2018. CEA 5.3 on 04/24/2018. Cycle # 46 FOLFIRI + Avastin was on 05/08/2018. CEA 5.4 on 05/08/2018. Cycle # 47 FOLFIRI + Avastin was on 05/22/2018. CEA 6.1 on 05/22/2018. CT chest 05/31/2018 noted stable nodule in LUCY. No evidence of worsening malignancy. CT abdomen/pelvis on 05/31/2018 noted stable liver metastasis.  No evidence of worsening malignancy. Continue FOLFIRI + Avastin and repeat scans in 3 months. Cycle # 48 FOLFIRI + Avastin was on 06/06/2018. CEA 6.3 on 06/05/2018. Cycle # 49 FOLFIRI + Avastin was on 06/19/2018. CEA 6.1 on 06/19/2018. Cycle # 50 FOLFIRI + Avastin was on 07/03/2018. CEA 7.4 on 07/03/2018. Cycle # 51 FOLFIRI + Avastin was on 07/24/2018. CEA 6.7 on 07/24/2018. Cycle # 52 FOLFIRI + Avastin was on 08/14/2018. CEA 6.8 on 08/14/2018. Cycle # 53 FOLFIRI + Avastin was on 08/28/2018. CEA 6.5 on 08/27/2018. CT chest 09/06/2018 noted interval decrease in size of LUCY measuring up to 4 mm, suggestive of treatment response. No mediastinal or hilar LN  CT abdomen/pelvis 09/06/2018 Slight interval increase in size of a previously noted metastatic lesion within the right hepatic lobe. This may be related to differences in phase of enhancement compared to the most recent study from 5/31/2018, the lesion remains decreased in size compared to the more previous studies. No abdominal, retroperitoneal, or pelvic lymphadenopathy. Continue FOLFIRI + Avastin and repeat scans in 2 months. Cycle # 54 FOLFIRI + Avastin was on 09/11/2018. CEA 6.4 on 09/10/2018. Cycle # 55 FOLFIRI + Avastin was on 09/25/2018. CEA 6.4 on 09/25/2018. Cycle # 56 FOLFIRI + Avastin was on 10/09/2018. CEA 6.7 on 10/08/2018. Cycle # 57 FOLFIRI + Avastin was on 10/23/2018. CEA 7.2 on 10/22/2018. CT chest 11/02/2018 stable 3 mm nodule within LUCY. No new right and left lung nodule. No mediastinal or osseous lesion. CT abdomen/pelvis 11/02/2018 stable metastatic disease to liver. No new metastatic disease identified. No mesenteric or retroperitoneal LN. No gross colonic lesion identified. Continue FOLFIRI + Avastin and repeat scans in 3 months. Cycle # 58 FOLFIRI + Avastin was on 11/06/2018. CEA 7.6 on 11/05/2018. Cycle # 59 FOLFIRI + Avastin was on 11/27/2018. CEA 6.9 on 11/26/2018. Cycle # 60 FOLFIRI + Avastin was on 12/11/2018.  CEA 6.7 on 12/11/2018. Cycle # 61 FOLFIRI + Avastin was on 01/08/2019. CEA 5.6 on 01/07/2019. Cycle # 62 FOLFIRI + Avastin was on 01/29/2019. CEA 4.6 on 01/28/2019. Cycle # 63 FOLFIRI + Avastin was on 02/12/2019. CEA 4.3 on 02/11/2019. CT chest 02/21/2019 no evidence of metastatic disease. CT abdomen/pelvis 02/21/2019 stable hypodense liver lesions. No evidence of worsening metastatic disease. 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 Ordered MRI thoracic spine and referred to neurosurgery team.  Cycle # 64 FOLFIRI + Avastin was on 02/26/2019. CEA 4.7 on 02/25/2019. Cycle # 65 FOLFIRI + Avastin was on 03/12/2019. CEA 4.0 on 03/11/2019. Cycle # 66 FOLFIRI + Avastin was on 03/26/2019. CEA 4.7 on 03/25/2019. Cycle # 67 FOLFIRI + Avastin was on 04/09/2019. CEA 5.6 on 04/08/2019. Cycle # 68 FOLFIRI + Avastin was on 04/30/2019. CEA 4.9 on 04/29/2019. Cycle # 69 FOLFIRI + Avastin was on 05/14/2019. CEA 5.3 on 05/14/2019. CT chest 05/23/2019 stable small lung nodule with no evidence of metastatic disease. CT abdomen/pelvis 05/23/2019 Decrease conspicuity of the liver lesions. No new lesions seen. Stable splenomegaly. Continue FOLFIRI + Avastin and repeat scans in 3 months. Cycle # 70 FOLFIRI + Avastin was on 06/04/2019. CEA 4.8 on 06/03/2019. Cycle # 71 FOLFIRI + Avastin was on 06/18/2019. CEA 4.6 on 06/17/2019. Cycle # 72 FOLFIRI + Avastin was on 07/09/2019. CEA 4.3 on 07/08/2019. Cycle # 73 FOLFIRI + Avastin was on 07/30/2019. CEA 5.0 on 07/29/2019. Cycle # 74 FOLFIRI + Avastin was on 08/13/2019. CEA 5.0 on 08/12/2019. CT chest 08/20/2019 negative  CT abdomen/pelvis 08/20/2019 Previous identified hepatic lesions are not visualized on the current exam.  Continue FOLFIRI + Avastin and repeat scans in 3 months. Cycle # 75 FOLFIRI + Avastin was on 08/27/2019. CEA 5.0 on 08/26/2019. Cycle # 76 FOLFIRI + Avastin was on 09/17/2019. CEA 5.5 on 09/16/2019.   Cycle # 77 FOLFIRI + Avastin was on 10/15/2019. CEA 4.6 on 10/15/2019. Cycle # 78 FOLFIRI + Avastin was on 10/29/2019. CEA 4.1 on 10/28/2019. Cycle # 79 FOLFIRI + Avastin was on 11/12/2019. CEA 4.3 on 11/12/2019. Cycle # 80 FOLFIRI + Avastin was on 12/10/2019. CEA 4.0 on 12/09/2019. CT chest 12/19/2019 Stable 3 mm nodule within the left upper lobe, similar to the previous studies dating back to 11/2/2018, however decreased in size compared to the CT from 3/26/2018. No thoracic LN. CT abdomen/pelvis 12/19/2019 Previously described small hypodense lesions are more conspicuous on the current study compared to the recent study throughout the liver from 8/20/2019, however similar to the prior study from 2/21/2019. Findings may be related to differences in contrast opacification. No abdominal or pelvic lymphadenopathy. Continue FOLFIRI + Avastin and repeat scans in 2 months to f/u on liver lesions. Cycle # 81 FOLFIRI + Avastin was on 01/07/2020. CEA 3.8 on 01/06/2020. Cycle # 82 FOLFIRI + Avastin was on 01/21/2020. CEA 3.7 on 01/20/2020. Cycle # 83 FOLFIRI + Avastin was on 02/04/2020. CEA 4.1 on 02/03/2020. Cycle # 84 FOLFIRI + Avastin was on 02/18/2020. CEA 4.6 on 02/17/2020. CT chest 2/28/2020 no evidence of metastatic disease to the lungs and no mediastinal or hilar adenopathy. CT abdomen pelvis 2/28/2020 no enhancing lesions seen within the liver to suggest metastatic disease. Wall thickening of the rectosigmoid junction. Images reviewed. Continue FOLFIRI Avastin and repeat scans in 3 months  EGD by Dr. Edel Crook 03/02/2020 showing no masses or lesions. Cycle # 85 FOLFIRI + Avastin was on 03/03/2020. CEA 7.4 on 03/02/2020. Cycle # 86 FOLFIRI + Avastin was on 03/17/2020. CEA 4.5 on 03/16/2020. Colonoscopy on 03/23/2020 by Dr. Edel aguero (records requested). Cycle # 87 FOLFIRI + Avastin was on 03/31/2020. CEA 4.1 on 03/30/2020. Cycle # 88 FOLFIRI + Avastin was on 04/21/2020.  CEA 6.4 on 04/20/2020. Cycle # 89 FOLFIRI + Avastin was on 05/05/2020. CEA 5.8 on 05/04/2020. Cycle # 90 FOLFIRI + Avastin was on 06/02/2020. CEA 5.5 on 06/01/2020. Cycle # 91 FOLFIRI + Avastin was on 06/16/2020. CEA 5.6 on 06/15/2020. CT chest 06/23/2020 noted no metastatic disease in the chest.   CT abdomen/pelvis 06/23/2020 Stable small liver lesions measuring up to 5 mm since 2019.   22 mm round mass in segment 8 of the liver with central high density stable from December 2019), previously measuring up to 34 mm in 2017. No additional metastatic disease in the abdomen or pelvis. Small amount of ascites. Overall stable disease. Continue FOLFIRI + Avastin and repeat scans in 2-3 months. Cycle # 92 FOLFIRI + Avastin was on 07/07/2020. CEA 5.7 on 07/06/2020. Cycle # 93 FOLFIRI + Avastin was on 07/21/2020. CEA 5.9 on 07/20/2020. Cycle # 94 FOLFIRI + Avastin was on 08/04/2020. CEA 5.5 on 08/04/2020. Cycle # 95 FOLFIRI + Avastin was on 08/25/2020. CEA 6.1 on 08/24/2020. CT chest 9/2/2020 stable unremarkable enhanced CT of the thorax. There is no metastatic disease to the lungs. CT abdomen pelvis on 9/2/2020 stable 2.2 cm low attenuated lesion within the central aspect of the right hepatic lobe favored to represent treated metastatic disease. No new hepatic lesion is identified. Stable splenomegaly  There is no appreciable colonic lesion or stricture. Pericholecystic fluid of uncertain etiology. General surgery team on board. Plan for Cholecystectomy with cholangiogram, lap/robotic, possible open. Cleared from Heme/Onc standpoint. Hold chemo at this time  Medical clearance from PCP    RTC 2 weeks post-op.     MSI testing noted no mismatch repair protein loss of expression    9/15/2020  Fritz Puga MD  Board Certified Medical Oncologist

## 2020-09-28 ENCOUNTER — TELEPHONE (OUTPATIENT)
Dept: SURGERY | Age: 70
End: 2020-09-28

## 2020-09-28 RX ORDER — ONDANSETRON 4 MG/1
4 TABLET, FILM COATED ORAL EVERY 8 HOURS PRN
Qty: 40 TABLET | Refills: 1 | Status: SHIPPED | OUTPATIENT
Start: 2020-09-28

## 2020-10-01 ENCOUNTER — HOSPITAL ENCOUNTER (OUTPATIENT)
Dept: INFUSION THERAPY | Age: 70
Discharge: HOME | End: 2020-10-01
Payer: MEDICARE

## 2020-10-01 DIAGNOSIS — C78.7 RECTAL CANCER METASTASIZED TO LIVER (HCC): Primary | ICD-10-CM

## 2020-10-01 DIAGNOSIS — C20 RECTAL CANCER METASTASIZED TO LIVER (HCC): Primary | ICD-10-CM

## 2020-10-01 PROCEDURE — 36591 DRAW BLOOD OFF VENOUS DEVICE: CPT

## 2020-10-01 RX ORDER — SODIUM CHLORIDE 0.9 % (FLUSH) 0.9 %
10 SYRINGE (ML) INJECTION PRN
Status: DISCONTINUED | OUTPATIENT
Start: 2020-10-01 | End: 2020-10-02 | Stop reason: HOSPADM

## 2020-10-01 RX ORDER — HEPARIN SODIUM (PORCINE) LOCK FLUSH IV SOLN 100 UNIT/ML 100 UNIT/ML
500 SOLUTION INTRAVENOUS PRN
Status: DISCONTINUED | OUTPATIENT
Start: 2020-10-01 | End: 2020-10-02 | Stop reason: HOSPADM

## 2020-10-01 RX ORDER — SODIUM CHLORIDE 0.9 % (FLUSH) 0.9 %
10 SYRINGE (ML) INJECTION PRN
Status: CANCELLED | OUTPATIENT
Start: 2020-10-01

## 2020-10-01 RX ORDER — HEPARIN SODIUM (PORCINE) LOCK FLUSH IV SOLN 100 UNIT/ML 100 UNIT/ML
SOLUTION INTRAVENOUS
Status: DISPENSED
Start: 2020-10-01 | End: 2020-10-01

## 2020-10-01 RX ORDER — HEPARIN SODIUM (PORCINE) LOCK FLUSH IV SOLN 100 UNIT/ML 100 UNIT/ML
500 SOLUTION INTRAVENOUS PRN
Status: CANCELLED | OUTPATIENT
Start: 2020-10-01

## 2020-10-01 NOTE — PROGRESS NOTES
Patient presents to clinic for labs today. Left chest single  SQ port accessed per policy using 40W 6.80KRDT Campos needle for good blood return. Aspirate for waste and specimen sent to lab. Site flushed easily with 10 mL NSS followed by 5 mL Heparin solution 100 units/ml rinse prior to de-access. Dry sterile dressing to area. Tolerated procedure well. Encouraged to schedule port flush every 4 weeks.

## 2020-10-03 ENCOUNTER — HOSPITAL ENCOUNTER (EMERGENCY)
Age: 70
Discharge: HOME | End: 2020-10-03
Attending: EMERGENCY MEDICINE
Payer: MEDICARE

## 2020-10-03 ENCOUNTER — APPOINTMENT (OUTPATIENT)
Dept: ULTRASOUND IMAGING | Age: 70
End: 2020-10-03
Payer: MEDICARE

## 2020-10-03 ENCOUNTER — APPOINTMENT (OUTPATIENT)
Dept: GENERAL RADIOLOGY | Age: 70
End: 2020-10-03
Payer: MEDICARE

## 2020-10-03 VITALS
TEMPERATURE: 98.2 F | WEIGHT: 193 LBS | SYSTOLIC BLOOD PRESSURE: 140 MMHG | OXYGEN SATURATION: 99 % | DIASTOLIC BLOOD PRESSURE: 75 MMHG | RESPIRATION RATE: 16 BRPM | BODY MASS INDEX: 25.58 KG/M2 | HEART RATE: 65 BPM | HEIGHT: 73 IN

## 2020-10-03 LAB
ALBUMIN SERPL-MCNC: 3.1 G/DL (ref 3.5–5.2)
ALP BLD-CCNC: 162 U/L (ref 40–129)
ALT SERPL-CCNC: 12 U/L (ref 0–40)
ANION GAP SERPL CALCULATED.3IONS-SCNC: 11 MMOL/L (ref 7–16)
AST SERPL-CCNC: 25 U/L (ref 0–39)
BASOPHILS ABSOLUTE: 0.05 E9/L (ref 0–0.2)
BASOPHILS RELATIVE PERCENT: 1.7 % (ref 0–2)
BILIRUB SERPL-MCNC: 0.7 MG/DL (ref 0–1.2)
BUN BLDV-MCNC: 14 MG/DL (ref 8–23)
CALCIUM SERPL-MCNC: 8.5 MG/DL (ref 8.6–10.2)
CHLORIDE BLD-SCNC: 98 MMOL/L (ref 98–107)
CO2: 23 MMOL/L (ref 22–29)
CREAT SERPL-MCNC: 1 MG/DL (ref 0.7–1.2)
EKG ATRIAL RATE: 63 BPM
EKG P AXIS: 48 DEGREES
EKG P-R INTERVAL: 180 MS
EKG Q-T INTERVAL: 492 MS
EKG QRS DURATION: 138 MS
EKG QTC CALCULATION (BAZETT): 503 MS
EKG R AXIS: -29 DEGREES
EKG T AXIS: 14 DEGREES
EKG VENTRICULAR RATE: 63 BPM
EOSINOPHILS ABSOLUTE: 0.11 E9/L (ref 0.05–0.5)
EOSINOPHILS RELATIVE PERCENT: 3.5 % (ref 0–6)
GFR AFRICAN AMERICAN: >60
GFR NON-AFRICAN AMERICAN: >60 ML/MIN/1.73
GLUCOSE BLD-MCNC: 114 MG/DL (ref 74–99)
HCT VFR BLD CALC: 25.6 % (ref 37–54)
HEMOGLOBIN: 8.2 G/DL (ref 12.5–16.5)
LYMPHOCYTES ABSOLUTE: 0.47 E9/L (ref 1.5–4)
LYMPHOCYTES RELATIVE PERCENT: 14.8 % (ref 20–42)
MCH RBC QN AUTO: 32.9 PG (ref 26–35)
MCHC RBC AUTO-ENTMCNC: 32 % (ref 32–34.5)
MCV RBC AUTO: 102.8 FL (ref 80–99.9)
MONOCYTES ABSOLUTE: 0.09 E9/L (ref 0.1–0.95)
MONOCYTES RELATIVE PERCENT: 2.6 % (ref 2–12)
NEUTROPHILS ABSOLUTE: 2.39 E9/L (ref 1.8–7.3)
NEUTROPHILS RELATIVE PERCENT: 77.4 % (ref 43–80)
OVALOCYTES: ABNORMAL
PDW BLD-RTO: 15.7 FL (ref 11.5–15)
PLATELET # BLD: 103 E9/L (ref 130–450)
PMV BLD AUTO: 11.8 FL (ref 7–12)
POIKILOCYTES: ABNORMAL
POTASSIUM SERPL-SCNC: 4.2 MMOL/L (ref 3.5–5)
RBC # BLD: 2.49 E12/L (ref 3.8–5.8)
SODIUM BLD-SCNC: 132 MMOL/L (ref 132–146)
TOTAL PROTEIN: 6 G/DL (ref 6.4–8.3)
TROPONIN: <0.01 NG/ML (ref 0–0.03)
WBC # BLD: 3.1 E9/L (ref 4.5–11.5)

## 2020-10-03 PROCEDURE — 93010 ELECTROCARDIOGRAM REPORT: CPT | Performed by: INTERNAL MEDICINE

## 2020-10-03 PROCEDURE — 93970 EXTREMITY STUDY: CPT

## 2020-10-03 PROCEDURE — 93005 ELECTROCARDIOGRAM TRACING: CPT | Performed by: EMERGENCY MEDICINE

## 2020-10-03 PROCEDURE — 99284 EMERGENCY DEPT VISIT MOD MDM: CPT

## 2020-10-03 PROCEDURE — 84484 ASSAY OF TROPONIN QUANT: CPT

## 2020-10-03 PROCEDURE — 71046 X-RAY EXAM CHEST 2 VIEWS: CPT

## 2020-10-03 PROCEDURE — 80053 COMPREHEN METABOLIC PANEL: CPT

## 2020-10-03 PROCEDURE — 99283 EMERGENCY DEPT VISIT LOW MDM: CPT

## 2020-10-03 PROCEDURE — 85025 COMPLETE CBC W/AUTO DIFF WBC: CPT

## 2020-10-03 RX ORDER — POTASSIUM CHLORIDE 750 MG/1
10 TABLET, EXTENDED RELEASE ORAL DAILY
Qty: 5 TABLET | Refills: 0 | Status: SHIPPED | OUTPATIENT
Start: 2020-10-03 | End: 2020-10-20

## 2020-10-03 RX ORDER — FUROSEMIDE 20 MG/1
20 TABLET ORAL DAILY
Qty: 5 TABLET | Refills: 0 | Status: SHIPPED | OUTPATIENT
Start: 2020-10-03 | End: 2020-10-20

## 2020-10-03 ASSESSMENT — ENCOUNTER SYMPTOMS
ABDOMINAL PAIN: 0
WHEEZING: 0
DIARRHEA: 0
COUGH: 0
BACK PAIN: 0
SORE THROAT: 0
VOMITING: 0
SHORTNESS OF BREATH: 0
CHEST TIGHTNESS: 0
NAUSEA: 0

## 2020-10-03 ASSESSMENT — PAIN DESCRIPTION - LOCATION: LOCATION: FOOT

## 2020-10-03 ASSESSMENT — PAIN DESCRIPTION - PAIN TYPE: TYPE: ACUTE PAIN

## 2020-10-03 ASSESSMENT — PAIN DESCRIPTION - DESCRIPTORS: DESCRIPTORS: SORE

## 2020-10-03 NOTE — ED NOTES
Discharge instructions given, medications and follow up instructions reviewed. Patient verbalized understanding, no other noted or stated problems at this time. Patient will follow up with physicians as directed.       Truman Key RN  10/03/20 [id number]

## 2020-10-03 NOTE — ED NOTES
Patient to [address] [place] [address] [address] and xray with transport.       Nasima Lucia RN  10/03/20 [id number]

## 2020-10-03 NOTE — ED NOTES
Dr. Dumont was notified of call from Dr. Marie from [facility]. Dr. Dumont will notify patient of Pathology Report results.    Patient states he has had intermittent BLE edema but has not been consistent for about a week.  Within the past few weeks has had and echo and a stress test that were both normal.      Lester Hastings RN  10/03/20 [id number]

## 2020-10-03 NOTE — ED PROVIDER NOTES
Chief complaint:  Leg edema      HPI history provided by the patient  Patient was in complaining of bilateral lower extremity edema from the knees and thighs down into the feet. States this is been worsening for the last week. Denies calf pain. Denies unilateral swelling. No chest pain, palpitations or shortness of breath. He states that within the last month or so he has had a stress test and an echo that were all negative and unremarkable. He states a month ago he had ultrasounds of his legs and when questioned as to why he states because his feet were swollen. Denies fevers, sweats or chills or skin color changes. No abdominal pain. No other extremity swelling or pain. Nothing really makes it better or worse at this time although he has taken no specific treatment prior to arrival and does state he is still taking his water pill as directed. Review of Systems   Constitutional: Negative for chills, diaphoresis, fatigue and fever. HENT: Negative for congestion and sore throat. Respiratory: Negative for cough, chest tightness, shortness of breath and wheezing. Cardiovascular: Positive for leg swelling. Negative for chest pain. Gastrointestinal: Negative for abdominal pain, diarrhea, nausea and vomiting. Genitourinary: Negative for dysuria, flank pain and frequency. Musculoskeletal: Negative for arthralgias, back pain, gait problem, joint swelling, myalgias, neck pain and neck stiffness. Skin: Negative for rash and wound. Neurological: Negative for dizziness, seizures, syncope, weakness, light-headedness, numbness and headaches. Hematological: Negative for adenopathy. All other systems reviewed and are negative. Physical Exam  Vitals signs and nursing note reviewed. Constitutional:       General: He is not in acute distress. Appearance: He is well-developed. He is not ill-appearing, toxic-appearing or diaphoretic. HENT:      Head: Normocephalic and atraumatic. Eyes:      General: No scleral icterus. Pupils: Pupils are equal, round, and reactive to light. Neck:      Musculoskeletal: Normal range of motion and neck supple. Cardiovascular:      Rate and Rhythm: Normal rate and regular rhythm. Heart sounds: Normal heart sounds. No murmur. Pulmonary:      Effort: Pulmonary effort is normal. No respiratory distress. Breath sounds: Normal breath sounds. No stridor, decreased air movement or transmitted upper airway sounds. No decreased breath sounds, wheezing, rhonchi or rales. Chest:      Chest wall: No tenderness. Abdominal:      General: Bowel sounds are normal. There is no distension. Palpations: Abdomen is soft. Tenderness: There is no abdominal tenderness. There is no right CVA tenderness, left CVA tenderness, guarding or rebound. Musculoskeletal:         General: No swelling, tenderness, deformity or signs of injury. Right lower leg: Edema present. Left lower leg: Edema present. Comments: +2-3 bilateral pitting edema from the knees down through the toes. No calf pain. He is neurovascular tach with palpable dorsal pedal pulses. Skin:     General: Skin is warm and dry. Coloration: Skin is not jaundiced or pale. Findings: No erythema or rash. Neurological:      General: No focal deficit present. Mental Status: He is alert and oriented to person, place, and time. GCS: GCS eye subscore is 4. GCS verbal subscore is 5. GCS motor subscore is 6. Cranial Nerves: No cranial nerve deficit.       Coordination: Coordination normal.          Procedures     MDM           EKG Interpretation    Interpreted by emergency department physician    Rhythm: normal sinus   Rate: 63  Axis: normal  Ectopy: none  Conduction: right bundle branch block (complete)  ST Segments: no acute change  T Waves: no acute change  Q Waves: none    Clinical Impression: no acute changes and right bundle branch block    William Rebolledo Cardinal          --------------------------------------------- PAST HISTORY ---------------------------------------------  Past Medical History:  has a past medical history of Arth[facility], [facility] Depression, Hyperlipidemia, and Hypertension. Past Surgical History:  has a past surgical history that includes Knee arthroscopy (Right); Tonsillectomy (Bilateral); Colonoscopy; Colonoscopy (11/2/15); liver biopsy (11/12/15); skin full graft; and fracture surgery. Social History:  reports that he quit smoking about 19 years ago. His smoking use included cigarettes. He has a 40.00 pack-year smoking history. He has never used smokeless tobacco. He reports that he does not drink alcohol or use drugs. Family History: family history includes Cancer in his father and mother; Diabetes in his brother. The patients home medications have been reviewed.     Allergies: Neosporin [neomycin-polymyxin-gramicidin] and Tape [adhesive tape]    -------------------------------------------------- RESULTS -------------------------------------------------  Labs:  Results for orders placed or performed during the hospital encounter of 10/03/20   CBC Auto Differential   Result Value Ref Range    WBC 3.1 (L) 4.5 - 11.5 E9/L    RBC 2.49 (L) 3.80 - 5.80 E12/L    Hemoglobin 8.2 (L) 12.5 - 16.5 g/dL    Hematocrit 25.6 (L) 37.0 - 54.0 %    .8 (H) 80.0 - 99.9 fL    MCH 32.9 26.0 - 35.0 pg    MCHC 32.0 32.0 - 34.5 %    RDW 15.7 (H) 11.5 - 15.0 fL    Platelets 077 (L) 852 - 450 E9/L    MPV 11.8 7.0 - 12.0 fL    Neutrophils % 77.4 43.0 - 80.0 %    Lymphocytes % 14.8 (L) 20.0 - 42.0 %    Monocytes % 2.6 2.0 - 12.0 %    Eosinophils % 3.5 0.0 - 6.0 %    Basophils % 1.7 0.0 - 2.0 %    Neutrophils Absolute 2.39 1.80 - 7.30 E9/L    Lymphocytes Absolute 0.47 (L) 1.50 - 4.00 E9/L    Monocytes Absolute 0.09 (L) 0.10 - 0.95 E9/L    Eosinophils Absolute 0.11 0.05 - 0.50 E9/L    Basophils Absolute 0.05 0.00 - 0.20 E9/L    Poikilocytes 1+ Ovalocytes 1+    Comprehensive Metabolic Panel   Result Value Ref Range    Sodium 132 132 - 146 mmol/L    Potassium 4.2 3.5 - 5.0 mmol/L    Chloride 98 98 - 107 mmol/L    CO2 23 22 - 29 mmol/L    Anion Gap 11 7 - 16 mmol/L    Glucose 114 (H) 74 - 99 mg/dL    BUN 14 8 - 23 mg/dL    CREATININE 1.0 0.7 - 1.2 mg/dL    GFR Non-African American >60 >=60 mL/min/1.73    GFR African American >60     Calcium 8.5 (L) 8.6 - 10.2 mg/dL    Total Protein 6.0 (L) 6.4 - 8.3 g/dL    Alb 3.1 (L) 3.5 - 5.2 g/dL    Total Bilirubin 0.7 0.0 - 1.2 mg/dL    Alkaline Phosphatase 162 (H) 40 - 129 U/L    ALT 12 0 - 40 U/L    AST 25 0 - 39 U/L   Troponin   Result Value Ref Range    Troponin <0.01 0.00 - 0.03 ng/mL   EKG 12 Lead   Result Value Ref Range    Ventricular Rate 63 BPM    Atrial Rate 63 BPM    P-R Interval 180 ms    QRS Duration 138 ms    Q-T Interval 492 ms    QTc Calculation (Bazett) 503 ms    P Axis 48 degrees    R Axis -29 degrees    T Axis 14 degrees       Radiology:  XR CHEST (2 VW)   Final Result   No acute process. US DUP LOWER EXTREMITIES BILATERAL VENOUS   Final Result   No evidence of DVT in either lower extremity.             ------------------------- NURSING NOTES AND VITALS REVIEWED ---------------------------  Date / Time Roomed:  10/3/2020 12:39 PM  ED Bed Assignment:  14/14    The nursing notes within the ED encounter and vital signs as below have been reviewed. BP (!) 144/74   Pulse 63   Temp 98.2 °F (36.8 °C)   Resp 16   Ht 6' 1\" (1.854 m)   Wt 193 lb (87.5 kg)   SpO2 99%   BMI 25.46 kg/m²   Oxygen Saturation Interpretation: Normal      ------------------------------------------ PROGRESS NOTES ------------------------------------------  I have spoken with the patient and discussed todays results, in addition to providing specific details for the plan of care and counseling regarding the diagnosis and prognosis. Their questions are answered at this time and they are agreeable with the plan.  I discussed at length with them reasons for immediate return here for re evaluation. They will followup with primary care by calling their office tomorrow. --------------------------------- ADDITIONAL PROVIDER NOTES ---------------------------------  At this time the patient is without objective evidence of an acute process requiring hospitalization or inpatient management. They have remained hemodynamically stable throughout their entire ED visit and are stable for discharge with outpatient follow-up. The plan has been discussed in detail and they are aware of the specific conditions for emergent return, as well as the importance of follow-up. New Prescriptions    FUROSEMIDE (LASIX) 20 MG TABLET    Take 1 tablet by mouth daily for 5 doses    POTASSIUM CHLORIDE (KLOR-CON M) 10 MEQ EXTENDED RELEASE TABLET    Take 1 tablet by mouth daily for 5 days       Diagnosis:  1. Leg edema    2. Peripheral edema        Disposition:  Patient's disposition: Discharge to home  Patient's condition is stable.          Maria Elena Alcala DO  10/03/20 1413

## 2020-10-06 ENCOUNTER — HOSPITAL ENCOUNTER (OUTPATIENT)
Dept: INFUSION THERAPY | Age: 70
Discharge: HOME | End: 2020-10-06
Payer: MEDICARE

## 2020-10-06 ENCOUNTER — TELEPHONE (OUTPATIENT)
Dept: SURGERY | Age: 70
End: 2020-10-06

## 2020-10-06 NOTE — TELEPHONE ENCOUNTER
Per Dr. Valere Severance, patient is scheduled for Lap/Robotic Cholecystectomy with IOC  at [address] [place] [address] on 10/27/20. Surgery scheduling form faxed with confirmation, surgeon's calendar updated.  to enter orders. Follow up appointment scheduled. No bowel prep instructions provided & discussed. Will check if pre-cert is required. Prior Authorization Form:      DEMOGRAPHICS:                     Patient Name:  Jeffrey Odom  Patient :  1950            Insurance:  Payor: Sophy Gilbert / Plan: [place] PPO / Product Type: Medicare /   Insurance ID Number:    Payor/Plan Subscr  Sex Relation Sub. Ins. ID Effective Group Num   1.  Sophy Guess* Mayme Chafe 1950 Male  [id number]                                    [id number]         DIAGNOSIS & PROCEDURE:                       Procedure/Operation: Lap/Robotic Cholecystectomy with IOC           CPT Code: 47254    Diagnosis:  Calculas of gallbladder with acute cholecystitis lap/robotic possible open    ICD10 Code: K80.00    Location:  [facility]    Surgeon:  Dr. Elisha Brown INFORMATION:                          Date: 10/27/20    Time: TBD              Anesthesia:                                                         Status:  Outpatient        Special Comments:         Electronically signed by Gary Boykin on 10/6/2020 at 10:22 AM

## 2020-10-20 ENCOUNTER — HOSPITAL ENCOUNTER (OUTPATIENT)
Dept: PREADMISSION TESTING | Age: 70
Discharge: HOME | End: 2020-10-20
Payer: MEDICARE

## 2020-10-20 VITALS
WEIGHT: 192 LBS | BODY MASS INDEX: 25.33 KG/M2 | HEART RATE: 59 BPM | DIASTOLIC BLOOD PRESSURE: 66 MMHG | OXYGEN SATURATION: 100 % | TEMPERATURE: 97.9 F | SYSTOLIC BLOOD PRESSURE: 138 MMHG | RESPIRATION RATE: 18 BRPM

## 2020-10-20 LAB
ALBUMIN SERPL-MCNC: 3.6 G/DL (ref 3.5–5.2)
ALP BLD-CCNC: 153 U/L (ref 40–129)
ALT SERPL-CCNC: 15 U/L (ref 0–40)
ANION GAP SERPL CALCULATED.3IONS-SCNC: 8 MMOL/L (ref 7–16)
AST SERPL-CCNC: 33 U/L (ref 0–39)
BASOPHILS ABSOLUTE: 0.02 E9/L (ref 0–0.2)
BASOPHILS RELATIVE PERCENT: 0.6 % (ref 0–2)
BILIRUB SERPL-MCNC: 1 MG/DL (ref 0–1.2)
BUN BLDV-MCNC: 18 MG/DL (ref 8–23)
CALCIUM SERPL-MCNC: 9.4 MG/DL (ref 8.6–10.2)
CHLORIDE BLD-SCNC: 107 MMOL/L (ref 98–107)
CO2: 25 MMOL/L (ref 22–29)
CREAT SERPL-MCNC: 1 MG/DL (ref 0.7–1.2)
EOSINOPHILS ABSOLUTE: 0.27 E9/L (ref 0.05–0.5)
EOSINOPHILS RELATIVE PERCENT: 7.9 % (ref 0–6)
GFR AFRICAN AMERICAN: >60
GFR NON-AFRICAN AMERICAN: >60 ML/MIN/1.73
GLUCOSE BLD-MCNC: 98 MG/DL (ref 74–99)
HCT VFR BLD CALC: 28.7 % (ref 37–54)
HEMOGLOBIN: 9.2 G/DL (ref 12.5–16.5)
IMMATURE GRANULOCYTES #: 0.01 E9/L
IMMATURE GRANULOCYTES %: 0.3 % (ref 0–5)
LYMPHOCYTES ABSOLUTE: 0.45 E9/L (ref 1.5–4)
LYMPHOCYTES RELATIVE PERCENT: 13.2 % (ref 20–42)
MCH RBC QN AUTO: 31.6 PG (ref 26–35)
MCHC RBC AUTO-ENTMCNC: 32.1 % (ref 32–34.5)
MCV RBC AUTO: 98.6 FL (ref 80–99.9)
MONOCYTES ABSOLUTE: 0.22 E9/L (ref 0.1–0.95)
MONOCYTES RELATIVE PERCENT: 6.5 % (ref 2–12)
NEUTROPHILS ABSOLUTE: 2.44 E9/L (ref 1.8–7.3)
NEUTROPHILS RELATIVE PERCENT: 71.5 % (ref 43–80)
OVALOCYTES: ABNORMAL
PDW BLD-RTO: 16 FL (ref 11.5–15)
PLATELET # BLD: 75 E9/L (ref 130–450)
PLATELET CONFIRMATION: NORMAL
PMV BLD AUTO: 10 FL (ref 7–12)
POIKILOCYTES: ABNORMAL
POLYCHROMASIA: ABNORMAL
POTASSIUM REFLEX MAGNESIUM: 4.3 MMOL/L (ref 3.5–5)
RBC # BLD: 2.91 E12/L (ref 3.8–5.8)
SODIUM BLD-SCNC: 140 MMOL/L (ref 132–146)
TOTAL PROTEIN: 6.7 G/DL (ref 6.4–8.3)
WBC # BLD: 3.5 E9/L (ref 4.5–11.5)

## 2020-10-20 PROCEDURE — 85025 COMPLETE CBC W/AUTO DIFF WBC: CPT

## 2020-10-20 PROCEDURE — 80053 COMPREHEN METABOLIC PANEL: CPT

## 2020-10-20 PROCEDURE — 36415 COLL VENOUS BLD VENIPUNCTURE: CPT

## 2020-10-20 ASSESSMENT — PAIN DESCRIPTION - ONSET: ONSET: ON-GOING

## 2020-10-20 ASSESSMENT — PAIN DESCRIPTION - PAIN TYPE: TYPE: CHRONIC PAIN

## 2020-10-20 ASSESSMENT — PAIN DESCRIPTION - DESCRIPTORS: DESCRIPTORS: ACHING

## 2020-10-20 ASSESSMENT — PAIN DESCRIPTION - FREQUENCY: FREQUENCY: CONTINUOUS

## 2020-10-20 ASSESSMENT — PAIN DESCRIPTION - LOCATION: LOCATION: BACK

## 2020-10-20 ASSESSMENT — PAIN SCALES - GENERAL: PAINLEVEL_OUTOF10: 4

## 2020-10-20 ASSESSMENT — PAIN - FUNCTIONAL ASSESSMENT: PAIN_FUNCTIONAL_ASSESSMENT: PREVENTS OR INTERFERES WITH ALL ACTIVE AND SOME PASSIVE ACTIVITIES

## 2020-10-20 ASSESSMENT — PAIN DESCRIPTION - ORIENTATION: ORIENTATION: LOWER

## 2020-10-20 ASSESSMENT — PAIN DESCRIPTION - PROGRESSION: CLINICAL_PROGRESSION: NOT CHANGED

## 2020-10-20 NOTE — PROGRESS NOTES
CBC/diff faxed to [facility] and oncology.   Isacc Aguero  10/20/2020  3:16 PM
(including no eye makeup) or nail polish on your fingers or toes. 11. DO NOT wear any jewelry or piercings on day of surgery. All body piercing jewelry must be removed. 12. Shower the night before surgery with _x__Antibacterial soap /KULDEEP WIPES________    13. TOTAL JOINT REPLACEMENT/HYSTERECTOMY PATIENTS ONLY---Remember to bring Blood Bank bracelet to the hospital on the day of surgery. 14. If you have a Living Will and Durable Power of  for Healthcare, please bring in a copy. 15. If appropriate bring crutches, inspirex, WALKER, CANE etc... 12. Notify your Surgeon if you develop any illness between now and surgery time, cough, cold, fever, sore throat, nausea, vomiting, etc.  Please notify your surgeon if you experience dizziness, shortness of breath or blurred vision between now & the time of your surgery. 17. If you have ___dentures, they will be removed before going to the OR; we will provide you a container. If you wear ___contact lenses or _x__glasses, they will be removed; please bring a case for them. 18. To provide excellent care visitors will be limited to 2 in the room at any given time. 19. Please bring picture ID and insurance card. 20. Sleep apnea patients need to bring CPAP AND SETTINGS to hospital on day of surgery. 21. During flu season no children under the age of 15 are permitted in the hospital for the safety of all patients. 22. Other come in main lobby and stop at                  Please call AMBULATORY CARE if you have any further questions.    [facility]     [address]

## 2020-10-21 ENCOUNTER — HOSPITAL ENCOUNTER (OUTPATIENT)
Age: 70
Discharge: HOME | End: 2020-10-23
Payer: MEDICARE

## 2020-10-21 PROCEDURE — U0003 INFECTIOUS AGENT DETECTION BY NUCLEIC ACID (DNA OR RNA); SEVERE ACUTE RESPIRATORY SYNDROME CORONAVIRUS 2 (SARS-COV-2) (CORONAVIRUS DISEASE [COVID-19]), AMPLIFIED PROBE TECHNIQUE, MAKING USE OF HIGH THROUGHPUT TECHNOLOGIES AS DESCRIBED BY CMS-2020-01-R: HCPCS

## 2020-10-23 LAB
SARS-COV-2: NOT DETECTED
SOURCE: NORMAL

## 2020-10-26 ENCOUNTER — ANESTHESIA EVENT (OUTPATIENT)
Dept: OPERATING ROOM | Age: 70
DRG: 419 | End: 2020-10-26
Payer: MEDICARE

## 2020-10-27 ENCOUNTER — APPOINTMENT (OUTPATIENT)
Dept: GENERAL RADIOLOGY | Age: 70
DRG: 419 | End: 2020-10-27
Attending: SURGERY
Payer: MEDICARE

## 2020-10-27 ENCOUNTER — ANESTHESIA (OUTPATIENT)
Dept: OPERATING ROOM | Age: 70
DRG: 419 | End: 2020-10-27
Payer: MEDICARE

## 2020-10-27 ENCOUNTER — HOSPITAL ENCOUNTER (INPATIENT)
Age: 70
LOS: 1 days | Discharge: HOME | DRG: 419 | End: 2020-10-28
Attending: SURGERY | Admitting: SURGERY
Payer: MEDICARE

## 2020-10-27 VITALS
DIASTOLIC BLOOD PRESSURE: 111 MMHG | SYSTOLIC BLOOD PRESSURE: 179 MMHG | RESPIRATION RATE: 15 BRPM | TEMPERATURE: 95.4 F | OXYGEN SATURATION: 100 %

## 2020-10-27 PROBLEM — K81.0 ACUTE CHOLECYSTITIS: Status: ACTIVE | Noted: 2020-10-27

## 2020-10-27 LAB
ANISOCYTOSIS: ABNORMAL
BASOPHILS ABSOLUTE: 0.04 E9/L (ref 0–0.2)
BASOPHILS RELATIVE PERCENT: 1 % (ref 0–2)
EOSINOPHILS ABSOLUTE: 0.44 E9/L (ref 0.05–0.5)
EOSINOPHILS RELATIVE PERCENT: 12 % (ref 0–6)
HCT VFR BLD CALC: 28.3 % (ref 37–54)
HCT VFR BLD CALC: 28.8 % (ref 37–54)
HEMOGLOBIN: 8.6 G/DL (ref 12.5–16.5)
HEMOGLOBIN: 9.3 G/DL (ref 12.5–16.5)
LYMPHOCYTES ABSOLUTE: 0.3 E9/L (ref 1.5–4)
LYMPHOCYTES RELATIVE PERCENT: 8 % (ref 20–42)
MCH RBC QN AUTO: 30.4 PG (ref 26–35)
MCH RBC QN AUTO: 31.2 PG (ref 26–35)
MCHC RBC AUTO-ENTMCNC: 30.4 % (ref 32–34.5)
MCHC RBC AUTO-ENTMCNC: 32.3 % (ref 32–34.5)
MCV RBC AUTO: 100 FL (ref 80–99.9)
MCV RBC AUTO: 96.6 FL (ref 80–99.9)
MONOCYTES ABSOLUTE: 0.33 E9/L (ref 0.1–0.95)
MONOCYTES RELATIVE PERCENT: 9 % (ref 2–12)
NEUTROPHILS ABSOLUTE: 2.59 E9/L (ref 1.8–7.3)
NEUTROPHILS RELATIVE PERCENT: 70 % (ref 43–80)
OVALOCYTES: ABNORMAL
PDW BLD-RTO: 16.1 FL (ref 11.5–15)
PDW BLD-RTO: 16.1 FL (ref 11.5–15)
PLATELET # BLD: 78 E9/L (ref 130–450)
PLATELET # BLD: 87 E9/L (ref 130–450)
PLATELET CONFIRMATION: NORMAL
PLATELET CONFIRMATION: NORMAL
PMV BLD AUTO: 12.3 FL (ref 7–12)
PMV BLD AUTO: 12.5 FL (ref 7–12)
POIKILOCYTES: ABNORMAL
RBC # BLD: 2.83 E12/L (ref 3.8–5.8)
RBC # BLD: 2.98 E12/L (ref 3.8–5.8)
TEAR DROP CELLS: ABNORMAL
WBC # BLD: 3.5 E9/L (ref 4.5–11.5)
WBC # BLD: 3.7 E9/L (ref 4.5–11.5)

## 2020-10-27 PROCEDURE — 8E0W4CZ ROBOTIC ASSISTED PROCEDURE OF TRUNK REGION, PERCUTANEOUS ENDOSCOPIC APPROACH: ICD-10-PCS | Performed by: SURGERY

## 2020-10-27 PROCEDURE — 3700000001 HC ADD 15 MINUTES (ANESTHESIA): Performed by: SURGERY

## 2020-10-27 PROCEDURE — 2500000003 HC RX 250 WO HCPCS

## 2020-10-27 PROCEDURE — 74300 X-RAY BILE DUCTS/PANCREAS: CPT

## 2020-10-27 PROCEDURE — 88304 TISSUE EXAM BY PATHOLOGIST: CPT

## 2020-10-27 PROCEDURE — 6360000002 HC RX W HCPCS: Performed by: ANESTHESIOLOGY

## 2020-10-27 PROCEDURE — 2709999900 HC NON-CHARGEABLE SUPPLY: Performed by: SURGERY

## 2020-10-27 PROCEDURE — 0FT44ZZ RESECTION OF GALLBLADDER, PERCUTANEOUS ENDOSCOPIC APPROACH: ICD-10-PCS | Performed by: SURGERY

## 2020-10-27 PROCEDURE — C1894 INTRO/SHEATH, NON-LASER: HCPCS | Performed by: SURGERY

## 2020-10-27 PROCEDURE — BF131ZZ FLUOROSCOPY OF GALLBLADDER AND BILE DUCTS USING LOW OSMOLAR CONTRAST: ICD-10-PCS | Performed by: SURGERY

## 2020-10-27 PROCEDURE — 36415 COLL VENOUS BLD VENIPUNCTURE: CPT

## 2020-10-27 PROCEDURE — 2580000003 HC RX 258: Performed by: SURGERY

## 2020-10-27 PROCEDURE — 85027 COMPLETE CBC AUTOMATED: CPT

## 2020-10-27 PROCEDURE — 47563 LAPARO CHOLECYSTECTOMY/GRAPH: CPT | Performed by: SURGERY

## 2020-10-27 PROCEDURE — 3600000009 HC SURGERY ROBOT BASE: Performed by: SURGERY

## 2020-10-27 PROCEDURE — 6360000002 HC RX W HCPCS

## 2020-10-27 PROCEDURE — 7100000011 HC PHASE II RECOVERY - ADDTL 15 MIN: Performed by: SURGERY

## 2020-10-27 PROCEDURE — 51798 US URINE CAPACITY MEASURE: CPT

## 2020-10-27 PROCEDURE — 6370000000 HC RX 637 (ALT 250 FOR IP): Performed by: SURGERY

## 2020-10-27 PROCEDURE — 1200000000 HC SEMI PRIVATE

## 2020-10-27 PROCEDURE — S2900 ROBOTIC SURGICAL SYSTEM: HCPCS | Performed by: SURGERY

## 2020-10-27 PROCEDURE — G0378 HOSPITAL OBSERVATION PER HR: HCPCS

## 2020-10-27 PROCEDURE — 7100000010 HC PHASE II RECOVERY - FIRST 15 MIN: Performed by: SURGERY

## 2020-10-27 PROCEDURE — 6360000002 HC RX W HCPCS: Performed by: SURGERY

## 2020-10-27 PROCEDURE — 3600000019 HC SURGERY ROBOT ADDTL 15MIN: Performed by: SURGERY

## 2020-10-27 PROCEDURE — 2500000003 HC RX 250 WO HCPCS: Performed by: SURGERY

## 2020-10-27 PROCEDURE — 3700000000 HC ANESTHESIA ATTENDED CARE: Performed by: SURGERY

## 2020-10-27 PROCEDURE — 85025 COMPLETE CBC W/AUTO DIFF WBC: CPT

## 2020-10-27 PROCEDURE — 7100000001 HC PACU RECOVERY - ADDTL 15 MIN: Performed by: SURGERY

## 2020-10-27 PROCEDURE — 7100000000 HC PACU RECOVERY - FIRST 15 MIN: Performed by: SURGERY

## 2020-10-27 RX ORDER — ROCURONIUM BROMIDE 10 MG/ML
INJECTION, SOLUTION INTRAVENOUS PRN
Status: DISCONTINUED | OUTPATIENT
Start: 2020-10-27 | End: 2020-10-27 | Stop reason: SDUPTHER

## 2020-10-27 RX ORDER — PANTOPRAZOLE SODIUM 40 MG/1
40 TABLET, DELAYED RELEASE ORAL DAILY
Status: DISCONTINUED | OUTPATIENT
Start: 2020-10-28 | End: 2020-10-28 | Stop reason: HOSPADM

## 2020-10-27 RX ORDER — TRAMADOL HYDROCHLORIDE 50 MG/1
50 TABLET ORAL EVERY 6 HOURS PRN
Status: DISCONTINUED | OUTPATIENT
Start: 2020-10-27 | End: 2020-10-28 | Stop reason: HOSPADM

## 2020-10-27 RX ORDER — FUROSEMIDE 20 MG/1
20 TABLET ORAL DAILY
Status: DISCONTINUED | OUTPATIENT
Start: 2020-10-27 | End: 2020-10-27

## 2020-10-27 RX ORDER — MEPERIDINE HYDROCHLORIDE 25 MG/ML
12.5 INJECTION INTRAMUSCULAR; INTRAVENOUS; SUBCUTANEOUS EVERY 5 MIN PRN
Status: DISCONTINUED | OUTPATIENT
Start: 2020-10-27 | End: 2020-10-27 | Stop reason: HOSPADM

## 2020-10-27 RX ORDER — SODIUM CHLORIDE 9 MG/ML
INJECTION INTRAVENOUS
Status: DISPENSED
Start: 2020-10-27 | End: 2020-10-27

## 2020-10-27 RX ORDER — DEXAMETHASONE SODIUM PHOSPHATE 10 MG/ML
INJECTION, SOLUTION INTRAMUSCULAR; INTRAVENOUS PRN
Status: DISCONTINUED | OUTPATIENT
Start: 2020-10-27 | End: 2020-10-27 | Stop reason: SDUPTHER

## 2020-10-27 RX ORDER — FUROSEMIDE 20 MG/1
20 TABLET ORAL DAILY
Status: DISCONTINUED | OUTPATIENT
Start: 2020-10-28 | End: 2020-10-28 | Stop reason: HOSPADM

## 2020-10-27 RX ORDER — KETOROLAC TROMETHAMINE 30 MG/ML
INJECTION, SOLUTION INTRAMUSCULAR; INTRAVENOUS PRN
Status: DISCONTINUED | OUTPATIENT
Start: 2020-10-27 | End: 2020-10-27 | Stop reason: SDUPTHER

## 2020-10-27 RX ORDER — ACETAMINOPHEN 500 MG
1000 TABLET ORAL ONCE
Status: COMPLETED | OUTPATIENT
Start: 2020-10-27 | End: 2020-10-27

## 2020-10-27 RX ORDER — LABETALOL HYDROCHLORIDE 5 MG/ML
INJECTION, SOLUTION INTRAVENOUS PRN
Status: DISCONTINUED | OUTPATIENT
Start: 2020-10-27 | End: 2020-10-27 | Stop reason: SDUPTHER

## 2020-10-27 RX ORDER — ONDANSETRON 2 MG/ML
INJECTION INTRAMUSCULAR; INTRAVENOUS PRN
Status: DISCONTINUED | OUTPATIENT
Start: 2020-10-27 | End: 2020-10-27 | Stop reason: SDUPTHER

## 2020-10-27 RX ORDER — PROPOFOL 10 MG/ML
INJECTION, EMULSION INTRAVENOUS PRN
Status: DISCONTINUED | OUTPATIENT
Start: 2020-10-27 | End: 2020-10-27 | Stop reason: SDUPTHER

## 2020-10-27 RX ORDER — TAMSULOSIN HYDROCHLORIDE 0.4 MG/1
0.8 CAPSULE ORAL DAILY
Status: DISCONTINUED | OUTPATIENT
Start: 2020-10-27 | End: 2020-10-28 | Stop reason: HOSPADM

## 2020-10-27 RX ORDER — SODIUM CHLORIDE, SODIUM LACTATE, POTASSIUM CHLORIDE, CALCIUM CHLORIDE 600; 310; 30; 20 MG/100ML; MG/100ML; MG/100ML; MG/100ML
INJECTION, SOLUTION INTRAVENOUS CONTINUOUS
Status: DISCONTINUED | OUTPATIENT
Start: 2020-10-27 | End: 2020-10-27

## 2020-10-27 RX ORDER — MORPHINE SULFATE 4 MG/ML
INJECTION, SOLUTION INTRAMUSCULAR; INTRAVENOUS
Status: DISPENSED
Start: 2020-10-27 | End: 2020-10-27

## 2020-10-27 RX ORDER — FENTANYL CITRATE 50 UG/ML
INJECTION, SOLUTION INTRAMUSCULAR; INTRAVENOUS PRN
Status: DISCONTINUED | OUTPATIENT
Start: 2020-10-27 | End: 2020-10-27 | Stop reason: SDUPTHER

## 2020-10-27 RX ORDER — HYDROCHLOROTHIAZIDE 25 MG/1
25 TABLET ORAL DAILY
Status: DISCONTINUED | OUTPATIENT
Start: 2020-10-28 | End: 2020-10-28 | Stop reason: HOSPADM

## 2020-10-27 RX ORDER — BUPIVACAINE HYDROCHLORIDE AND EPINEPHRINE 2.5; 5 MG/ML; UG/ML
INJECTION, SOLUTION EPIDURAL; INFILTRATION; INTRACAUDAL; PERINEURAL PRN
Status: DISCONTINUED | OUTPATIENT
Start: 2020-10-27 | End: 2020-10-27 | Stop reason: ALTCHOICE

## 2020-10-27 RX ORDER — POTASSIUM CHLORIDE 20 MEQ/1
40 TABLET, EXTENDED RELEASE ORAL DAILY
Status: DISCONTINUED | OUTPATIENT
Start: 2020-10-27 | End: 2020-10-28 | Stop reason: HOSPADM

## 2020-10-27 RX ORDER — ONDANSETRON 4 MG/1
4 TABLET, FILM COATED ORAL EVERY 8 HOURS PRN
Status: DISCONTINUED | OUTPATIENT
Start: 2020-10-27 | End: 2020-10-28 | Stop reason: HOSPADM

## 2020-10-27 RX ORDER — MORPHINE SULFATE 1 MG/ML
INJECTION, SOLUTION EPIDURAL; INTRATHECAL; INTRAVENOUS PRN
Status: DISCONTINUED | OUTPATIENT
Start: 2020-10-27 | End: 2020-10-27 | Stop reason: SDUPTHER

## 2020-10-27 RX ORDER — POLYETHYLENE GLYCOL 3350 17 G/17G
17 POWDER, FOR SOLUTION ORAL DAILY PRN
Status: DISCONTINUED | OUTPATIENT
Start: 2020-10-27 | End: 2020-10-28 | Stop reason: HOSPADM

## 2020-10-27 RX ORDER — ONDANSETRON 2 MG/ML
4 INJECTION INTRAMUSCULAR; INTRAVENOUS
Status: COMPLETED | OUTPATIENT
Start: 2020-10-27 | End: 2020-10-27

## 2020-10-27 RX ORDER — SODIUM CHLORIDE, SODIUM LACTATE, POTASSIUM CHLORIDE, CALCIUM CHLORIDE 600; 310; 30; 20 MG/100ML; MG/100ML; MG/100ML; MG/100ML
INJECTION, SOLUTION INTRAVENOUS CONTINUOUS
Status: DISCONTINUED | OUTPATIENT
Start: 2020-10-27 | End: 2020-10-27 | Stop reason: CLARIF

## 2020-10-27 RX ORDER — HYDROCODONE BITARTRATE AND ACETAMINOPHEN 5; 325 MG/1; MG/1
1 TABLET ORAL EVERY 4 HOURS PRN
Qty: 6 TABLET | Refills: 0 | Status: SHIPPED | OUTPATIENT
Start: 2020-10-27 | End: 2020-10-30

## 2020-10-27 RX ORDER — MIDAZOLAM HYDROCHLORIDE 1 MG/ML
INJECTION INTRAMUSCULAR; INTRAVENOUS PRN
Status: DISCONTINUED | OUTPATIENT
Start: 2020-10-27 | End: 2020-10-27 | Stop reason: SDUPTHER

## 2020-10-27 RX ORDER — MORPHINE SULFATE 2 MG/ML
2 INJECTION, SOLUTION INTRAMUSCULAR; INTRAVENOUS
Status: DISCONTINUED | OUTPATIENT
Start: 2020-10-27 | End: 2020-10-28

## 2020-10-27 RX ADMIN — HYDROMORPHONE HYDROCHLORIDE 0.5 MG: 1 INJECTION, SOLUTION INTRAMUSCULAR; INTRAVENOUS; SUBCUTANEOUS at 10:28

## 2020-10-27 RX ADMIN — HYDROMORPHONE HYDROCHLORIDE 0.5 MG: 1 INJECTION, SOLUTION INTRAMUSCULAR; INTRAVENOUS; SUBCUTANEOUS at 10:48

## 2020-10-27 RX ADMIN — ONDANSETRON 4 MG: 2 INJECTION INTRAMUSCULAR; INTRAVENOUS at 09:12

## 2020-10-27 RX ADMIN — SODIUM CHLORIDE, POTASSIUM CHLORIDE, SODIUM LACTATE AND CALCIUM CHLORIDE: 600; 310; 30; 20 INJECTION, SOLUTION INTRAVENOUS at 08:58

## 2020-10-27 RX ADMIN — LABETALOL HYDROCHLORIDE 2.5 MG: 5 INJECTION INTRAVENOUS at 09:01

## 2020-10-27 RX ADMIN — POTASSIUM CHLORIDE: 2 INJECTION, SOLUTION, CONCENTRATE INTRAVENOUS at 15:11

## 2020-10-27 RX ADMIN — ROCURONIUM BROMIDE 10 MG: 10 SOLUTION INTRAVENOUS at 08:18

## 2020-10-27 RX ADMIN — TRAMADOL HYDROCHLORIDE 50 MG: 50 TABLET, FILM COATED ORAL at 20:37

## 2020-10-27 RX ADMIN — HYDROMORPHONE HYDROCHLORIDE 0.5 MG: 1 INJECTION, SOLUTION INTRAMUSCULAR; INTRAVENOUS; SUBCUTANEOUS at 10:07

## 2020-10-27 RX ADMIN — LABETALOL HYDROCHLORIDE 2.5 MG: 5 INJECTION INTRAVENOUS at 08:37

## 2020-10-27 RX ADMIN — FENTANYL CITRATE 100 MCG: 50 INJECTION, SOLUTION INTRAMUSCULAR; INTRAVENOUS at 08:05

## 2020-10-27 RX ADMIN — MIDAZOLAM 1 MG: 1 INJECTION INTRAMUSCULAR; INTRAVENOUS at 07:58

## 2020-10-27 RX ADMIN — FENTANYL CITRATE 100 MCG: 50 INJECTION, SOLUTION INTRAMUSCULAR; INTRAVENOUS at 08:21

## 2020-10-27 RX ADMIN — SODIUM CHLORIDE, POTASSIUM CHLORIDE, SODIUM LACTATE AND CALCIUM CHLORIDE: 600; 310; 30; 20 INJECTION, SOLUTION INTRAVENOUS at 06:50

## 2020-10-27 RX ADMIN — DEXAMETHASONE SODIUM PHOSPHATE 10 MG: 10 INJECTION, SOLUTION INTRAMUSCULAR; INTRAVENOUS at 08:12

## 2020-10-27 RX ADMIN — SODIUM CHLORIDE, POTASSIUM CHLORIDE, SODIUM LACTATE AND CALCIUM CHLORIDE: 600; 310; 30; 20 INJECTION, SOLUTION INTRAVENOUS at 14:32

## 2020-10-27 RX ADMIN — FENTANYL CITRATE 100 MCG: 50 INJECTION, SOLUTION INTRAMUSCULAR; INTRAVENOUS at 09:30

## 2020-10-27 RX ADMIN — KETOROLAC TROMETHAMINE 30 MG: 30 INJECTION, SOLUTION INTRAMUSCULAR at 09:30

## 2020-10-27 RX ADMIN — Medication 2 G: at 08:02

## 2020-10-27 RX ADMIN — HYDROMORPHONE HYDROCHLORIDE 0.25 MG: 1 INJECTION, SOLUTION INTRAMUSCULAR; INTRAVENOUS; SUBCUTANEOUS at 11:25

## 2020-10-27 RX ADMIN — MIDAZOLAM 1 MG: 1 INJECTION INTRAMUSCULAR; INTRAVENOUS at 08:02

## 2020-10-27 RX ADMIN — HYDROMORPHONE HYDROCHLORIDE 0.5 MG: 1 INJECTION, SOLUTION INTRAMUSCULAR; INTRAVENOUS; SUBCUTANEOUS at 09:55

## 2020-10-27 RX ADMIN — ROCURONIUM BROMIDE 40 MG: 10 SOLUTION INTRAVENOUS at 08:06

## 2020-10-27 RX ADMIN — ROCURONIUM BROMIDE 20 MG: 10 SOLUTION INTRAVENOUS at 08:29

## 2020-10-27 RX ADMIN — TAMSULOSIN HYDROCHLORIDE 0.8 MG: 0.4 CAPSULE ORAL at 14:45

## 2020-10-27 RX ADMIN — ACETAMINOPHEN 1000 MG: 500 TABLET, FILM COATED ORAL at 06:57

## 2020-10-27 RX ADMIN — MORPHINE SULFATE 2 MG: 1 INJECTION, SOLUTION EPIDURAL; INTRATHECAL; INTRAVENOUS at 09:37

## 2020-10-27 RX ADMIN — PROPOFOL 150 MG: 10 INJECTION, EMULSION INTRAVENOUS at 08:05

## 2020-10-27 RX ADMIN — Medication 0.25 MG: at 11:25

## 2020-10-27 RX ADMIN — ONDANSETRON 4 MG: 2 INJECTION INTRAMUSCULAR; INTRAVENOUS at 14:32

## 2020-10-27 ASSESSMENT — PULMONARY FUNCTION TESTS
PIF_VALUE: 1
PIF_VALUE: 16
PIF_VALUE: 10
PIF_VALUE: 10
PIF_VALUE: 20
PIF_VALUE: 28
PIF_VALUE: 22
PIF_VALUE: 22
PIF_VALUE: 7
PIF_VALUE: 19
PIF_VALUE: 22
PIF_VALUE: 16
PIF_VALUE: 29
PIF_VALUE: 22
PIF_VALUE: 5
PIF_VALUE: 16
PIF_VALUE: 22
PIF_VALUE: 29
PIF_VALUE: 20
PIF_VALUE: 6
PIF_VALUE: 22
PIF_VALUE: 20
PIF_VALUE: 22
PIF_VALUE: 1
PIF_VALUE: 22
PIF_VALUE: 13
PIF_VALUE: 1
PIF_VALUE: 11
PIF_VALUE: 16
PIF_VALUE: 11
PIF_VALUE: 13
PIF_VALUE: 0
PIF_VALUE: 22
PIF_VALUE: 21
PIF_VALUE: 10
PIF_VALUE: 17
PIF_VALUE: 22
PIF_VALUE: 22
PIF_VALUE: 3
PIF_VALUE: 0
PIF_VALUE: 23
PIF_VALUE: 29
PIF_VALUE: 19
PIF_VALUE: 16
PIF_VALUE: 22
PIF_VALUE: 14
PIF_VALUE: 22
PIF_VALUE: 22
PIF_VALUE: 24
PIF_VALUE: 16
PIF_VALUE: 1
PIF_VALUE: 22
PIF_VALUE: 22
PIF_VALUE: 1
PIF_VALUE: 21
PIF_VALUE: 22
PIF_VALUE: 20
PIF_VALUE: 16
PIF_VALUE: 28
PIF_VALUE: 20
PIF_VALUE: 29
PIF_VALUE: 22
PIF_VALUE: 1
PIF_VALUE: 29
PIF_VALUE: 13
PIF_VALUE: 1
PIF_VALUE: 10
PIF_VALUE: 22
PIF_VALUE: 21
PIF_VALUE: 28
PIF_VALUE: 20
PIF_VALUE: 22
PIF_VALUE: 13
PIF_VALUE: 22
PIF_VALUE: 16
PIF_VALUE: 22
PIF_VALUE: 14
PIF_VALUE: 24
PIF_VALUE: 1
PIF_VALUE: 16
PIF_VALUE: 14
PIF_VALUE: 28
PIF_VALUE: 1
PIF_VALUE: 0
PIF_VALUE: 15
PIF_VALUE: 22
PIF_VALUE: 11
PIF_VALUE: 16
PIF_VALUE: 22
PIF_VALUE: 22

## 2020-10-27 ASSESSMENT — PAIN SCALES - GENERAL
PAINLEVEL_OUTOF10: 5
PAINLEVEL_OUTOF10: 5
PAINLEVEL_OUTOF10: 1
PAINLEVEL_OUTOF10: 4
PAINLEVEL_OUTOF10: 10
PAINLEVEL_OUTOF10: 9
PAINLEVEL_OUTOF10: 6
PAINLEVEL_OUTOF10: 7
PAINLEVEL_OUTOF10: 10

## 2020-10-27 ASSESSMENT — PAIN - FUNCTIONAL ASSESSMENT
PAIN_FUNCTIONAL_ASSESSMENT: 0-10
PAIN_FUNCTIONAL_ASSESSMENT: PREVENTS OR INTERFERES SOME ACTIVE ACTIVITIES AND ADLS

## 2020-10-27 ASSESSMENT — PAIN DESCRIPTION - LOCATION
LOCATION: ABDOMEN
LOCATION: ABDOMEN;CHEST
LOCATION: ABDOMEN

## 2020-10-27 ASSESSMENT — PAIN DESCRIPTION - PAIN TYPE
TYPE: SURGICAL PAIN
TYPE: SURGICAL PAIN;REFERRED PAIN
TYPE: SURGICAL PAIN

## 2020-10-27 ASSESSMENT — PAIN DESCRIPTION - DESCRIPTORS: DESCRIPTORS: ACHING;THROBBING

## 2020-10-27 ASSESSMENT — PAIN DESCRIPTION - PROGRESSION
CLINICAL_PROGRESSION: GRADUALLY IMPROVING
CLINICAL_PROGRESSION: GRADUALLY IMPROVING

## 2020-10-27 NOTE — PROGRESS NOTES
1110 binder removed. No edema noted. No further bleeding noted and area around is softer to the touch.

## 2020-10-27 NOTE — PROGRESS NOTES
[address] [facility] with ice bag under applied per Dr. Nava Pickett order.  Patient kept npo for now

## 2020-10-27 NOTE — H&P
Cliff Barrow  10/27/2020  [address]               History and Physical  Laparoscopic Cholecystectomy (64642)    CHIEF COMPLAINT: cholecystitis with stones    HISTORY OF PRESENT ILLNESS:Hesham Hilton is a 69 y.o.  male with PMHx of rectal cancer with liver and lung mets that has resolved with chemotherapy through left chest mediport. Dr Saadia Arora (oncology) sent pt here due to recent gallstones and gallbladder wall edema on CT scan (9/2/2020) and recommend pt follow up for cholecystectomy. Pt denies any RUQ pain and shoulder pain, but reports some numbness, tingling and neuropathy as a result from chemotherapy treatment. Pt has some flank pain and inguinal hernia that is reproducible that often goes into R scrotum without pain or concern at this time. Past Medical History:   Diagnosis Date    Arthritis     Cancer ([facility] [address]     colon    Depression     History of blood transfusion     [facility]    Hyperlipidemia     Hypertension      Past Surgical History:   Procedure Laterality Date    COLONOSCOPY      COLONOSCOPY  11/2/15    bx, rectal mass    FRACTURE SURGERY      KNEE ARTHROSCOPY Right     LIVER BIOPSY  11/12/15    SKIN FULL GRAFT      on finger    TONSILLECTOMY Bilateral     TUNNELED CENTRAL VENOUS CATHETER W/ SUBCUTANEOUS PORT Left        Home Medications  Prior to Visit Medications    Medication Sig Taking?  Authorizing Provider   pantoprazole (PROTONIX) 40 MG tablet Take 40 mg by mouth daily Yes Historical Provider, MD   ondansetron (ZOFRAN) 4 MG tablet Take 1 tablet by mouth every 8 hours as needed for Nausea or Vomiting  Suzie Queen APRN - CNP   lactulose (CHRONULAC) 10 GM/15ML solution Take 20 g by mouth as needed   Historical Provider, MD   furosemide (LASIX) 20 MG tablet Take 20 mg by mouth daily Indications: pt is taken this med 7 days a week for now   Historical Provider, MD   magnesium oxide (MAG-OX) 400 (240 Mg) MG tablet Take 1 tablet by mouth 2 times for arthralgias and back pain  All others reviewed, negative    Physical Exam:   VITALS: Blood pressure (!) 146/69, pulse 71, temperature 98.7 °F (37.1 °C), temperature source Temporal, resp. rate 18, height 6' 1\" (1.854 m), weight 192 lb (87.1 kg), SpO2 100 %. 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 Cholecystectomy with cholangiogram, lap/robotic, possible open (14684). He will go home with Acetaminophen 500 mg qid for 3 days, Oxycodone 5 mg q 8 hours prn for breakthrough pain. Discussed the risk of surgery including bleeding, infection, bile leak, common duct injury and needing further procedures. We also discussed wound infections, hernias and the risks of general anesthetic including MI, CVA, sudden death or reactions to anesthetic medications. The patient understands the risks. All questions were answered to the patient's satisfaction and they freely signed the consent.     Physician Signature: Electronically signed by Dr. Gena Kim MD    Send copy of H&P to PCP, Massiel Hussein MD

## 2020-10-27 NOTE — ANESTHESIA PRE PROCEDURE
Value Date    COVID19 Not Detected 10/21/2020         Anesthesia Evaluation  Patient summary reviewed no history of anesthetic complications:   Airway: Mallampati: II  TM distance: >3 FB   Neck ROM: full  Mouth opening: > = 3 FB Dental: normal exam         Pulmonary: breath sounds clear to auscultation                            ROS comment: Former smoker   Cardiovascular:  Exercise tolerance: good (>4 METS),   (+) hypertension: moderate,       ECG reviewed  Rhythm: regular  Rate: normal    Stress test reviewed                Neuro/Psych:   (+) psychiatric history: stable with treatmentdepression/anxiety             GI/Hepatic/Renal:   (+) liver disease (Colon cancer with liver metas):,      (-) no renal disease       Endo/Other:    (+) blood dyscrasia: anemia:., malignancy/cancer. (-) diabetes mellitus, hypothyroidism               Abdominal:         (-) obese     Vascular: negative vascular ROS. Anesthesia Plan      general     ASA 3       Induction: intravenous. BIS  MIPS: Postoperative opioids intended and Prophylactic antiemetics administered. Anesthetic plan and risks discussed with patient. Plan discussed with CRNA.                   Vivian Arcos MD   10/27/2020

## 2020-10-27 NOTE — OP NOTE
[address] MOR[address] 6.    Operative Report  DATE OF PROCEDURE: 10/27/2020  SURGEON: Dr. Jimmy Das M.D.   ASSISTANT: SURGEON: Cecil Tarango pgy 3   PREOPERATIVE DIAGNOSIS: Acute cholecystitis with stones (K81.9) chronic (K81.1) RUQ abdominal Pain (R10.11)  POSTOPERATIVE DIAGNOSIS: Same. OPERATION: Laparoscopic robot assisted cholecystectomy with cholangiogram (63597)  ANESTHESIA: General endotracheal.   ESTIMATED BLOOD LOSS: None. SPECIMEN: gallbladder  Findings:  Swollen gallbladder, no stones in the common bile duct. HISTORY: Fabián De Jesus is a  69 y.o.  male with PMHx of rectal cancer with liver and lung mets that resolved with chemotherapy through left chest mediport. Recently diagnosed with gallstones and gallbladder wall edema on CT scan (9/2/2020) . We discussed the different medical and surgical options and we decided to proceed with a laparoscopic cholecystectomy with cholangiogram. We discussed the extensive risks involved in the surgery including the risk of bleeding, infection, bile leak, common duct injury and needing further procedures. We also discussed wound infections, hernias and the risks of general anesthetic including MI, CVA, sudden death or reactions to anesthetic medications. The patient understood the risks. All questions were answered to the patient's satisfaction and they freely signed the consent and wished to proceed. OPERATION: The patient was placed on the table in a supine position. He  was given Ancef 2 gram IV preop. SCD's were placed on the legs as DVT prophylaxis. An orogastric tube was placed into the stomach after induction of general anesthesia. The abdomen was prepped with ChloroPrep and draped in the usual sterile fashion. Marcaine was used to numb the skin and the muscle of each incision to help with post op pain control. An 8 mm incision was made below the umbilicus.  A veries needle was used to insufflate the abdomen with CO2. An 8 mm trocar was placed and the 8 mm 3D 30 degree angled scope was used. An 8-mm robot trocar was placed in the left lateral abdomen, a 12 mm non-bladed trocar placed in the right mid abdomen. An 8 mm trocar in the right lateral abdomen. The Mixter 500 cholangiogram catheter was pressed through the right upper quadrant abdominal wall and the inner needle was removed. The robot was docked using 3 arms. A 5 mm assistant grasper was used to grab the fundus of the gallbladder through the 12 trocar. A Pro-grasp was used to grab the neck and retract laterally. Scissors with cautery was used to do the dissection down around the cystic duct. The duct was isolated, clipped once distally with a robotic clip. The cystic artery was clipped proximally and distally. Scissors were used to make a hole in the cystic duct. The cholangiogram catheter was placed inside the duct and a clipped to hold it in place. The right robot arm was undocked and the Fluoroscopy machine was brought into position. A cholangiogram was done. Contrast was seen to flow into the small bowel and up into the liver. No stones were seen. The catheter was removed, two clips were placed on the proximal cystic duct. The duct and artery were divided. The rest of the adhesions to the liver were taken down easily with the hook cautery. Small bleeders were controlled with the cautery. There was no bleeding or bile spillage. The robot arm was undocked and the gallbladder was placed in a pouch and brought up through the 12 mm trocar site. The fascial defect was not closed. No other pathology was seen, so the CO2 was released and the trocars removed. The wounds were closed with 4-0 Monocryl dermal stitches. Skin-Afix glue was applied to each incision, no dressing. The needle and sponge count were correct. The patient tolerated the procedure well and went to recovery in stable condition.  He had some bleeding from the left 8 mm trocar site in recovery which stopped with ice and pressure for 20 minutes. Plan:    The patient was slow to wake after surgery, transported down to [facility], very lethargic and was unable to void so will be kept over night.     Physician Signature: Electronically signed by Dr. Rick Hanna M.D.

## 2020-10-27 NOTE — PROGRESS NOTES
0940 while assessing abdomen noted a protrusion and firm area noted around lap site. Area was marked. Dr. Louann Grace at bedside to assess. Pressure held to site.

## 2020-10-28 VITALS
SYSTOLIC BLOOD PRESSURE: 117 MMHG | RESPIRATION RATE: 16 BRPM | BODY MASS INDEX: 25.45 KG/M2 | DIASTOLIC BLOOD PRESSURE: 65 MMHG | WEIGHT: 192 LBS | OXYGEN SATURATION: 99 % | HEART RATE: 59 BPM | TEMPERATURE: 97.8 F | HEIGHT: 73 IN

## 2020-10-28 LAB
ALBUMIN SERPL-MCNC: 3.2 G/DL (ref 3.5–5.2)
ALP BLD-CCNC: 124 U/L (ref 40–129)
ALT SERPL-CCNC: 23 U/L (ref 0–40)
ANION GAP SERPL CALCULATED.3IONS-SCNC: 8 MMOL/L (ref 7–16)
AST SERPL-CCNC: 44 U/L (ref 0–39)
BILIRUB SERPL-MCNC: 0.9 MG/DL (ref 0–1.2)
BUN BLDV-MCNC: 26 MG/DL (ref 8–23)
CALCIUM SERPL-MCNC: 7.9 MG/DL (ref 8.6–10.2)
CHLORIDE BLD-SCNC: 105 MMOL/L (ref 98–107)
CO2: 22 MMOL/L (ref 22–29)
CREAT SERPL-MCNC: 1.1 MG/DL (ref 0.7–1.2)
GFR AFRICAN AMERICAN: >60
GFR NON-AFRICAN AMERICAN: >60 ML/MIN/1.73
GLUCOSE BLD-MCNC: 141 MG/DL (ref 74–99)
HCT VFR BLD CALC: 24.5 % (ref 37–54)
HEMOGLOBIN: 7.8 G/DL (ref 12.5–16.5)
MCH RBC QN AUTO: 31.5 PG (ref 26–35)
MCHC RBC AUTO-ENTMCNC: 31.8 % (ref 32–34.5)
MCV RBC AUTO: 98.8 FL (ref 80–99.9)
PDW BLD-RTO: 15.9 FL (ref 11.5–15)
PLATELET # BLD: 61 E9/L (ref 130–450)
PLATELET CONFIRMATION: NORMAL
PMV BLD AUTO: 10.5 FL (ref 7–12)
POTASSIUM SERPL-SCNC: 4.3 MMOL/L (ref 3.5–5)
RBC # BLD: 2.48 E12/L (ref 3.8–5.8)
SODIUM BLD-SCNC: 135 MMOL/L (ref 132–146)
TOTAL PROTEIN: 5.9 G/DL (ref 6.4–8.3)
WBC # BLD: 4.2 E9/L (ref 4.5–11.5)

## 2020-10-28 PROCEDURE — G0378 HOSPITAL OBSERVATION PER HR: HCPCS

## 2020-10-28 PROCEDURE — 51701 INSERT BLADDER CATHETER: CPT

## 2020-10-28 PROCEDURE — 6370000000 HC RX 637 (ALT 250 FOR IP): Performed by: SURGERY

## 2020-10-28 PROCEDURE — 36415 COLL VENOUS BLD VENIPUNCTURE: CPT

## 2020-10-28 PROCEDURE — 96376 TX/PRO/DX INJ SAME DRUG ADON: CPT

## 2020-10-28 PROCEDURE — 80053 COMPREHEN METABOLIC PANEL: CPT

## 2020-10-28 PROCEDURE — 6360000002 HC RX W HCPCS: Performed by: SURGERY

## 2020-10-28 PROCEDURE — 51798 US URINE CAPACITY MEASURE: CPT

## 2020-10-28 PROCEDURE — 85027 COMPLETE CBC AUTOMATED: CPT

## 2020-10-28 PROCEDURE — 96374 THER/PROPH/DIAG INJ IV PUSH: CPT

## 2020-10-28 RX ORDER — TAMSULOSIN HYDROCHLORIDE 0.4 MG/1
0.8 CAPSULE ORAL DAILY
Qty: 30 CAPSULE | Refills: 0 | Status: SHIPPED | OUTPATIENT
Start: 2020-10-29

## 2020-10-28 RX ORDER — HEPARIN SODIUM (PORCINE) LOCK FLUSH IV SOLN 100 UNIT/ML 100 UNIT/ML
SOLUTION INTRAVENOUS
Status: DISCONTINUED
Start: 2020-10-28 | End: 2020-10-28 | Stop reason: HOSPADM

## 2020-10-28 RX ADMIN — TRAMADOL HYDROCHLORIDE 50 MG: 50 TABLET, FILM COATED ORAL at 09:57

## 2020-10-28 RX ADMIN — MORPHINE SULFATE 2 MG: 2 INJECTION, SOLUTION INTRAMUSCULAR; INTRAVENOUS at 06:38

## 2020-10-28 RX ADMIN — MORPHINE SULFATE 2 MG: 2 INJECTION, SOLUTION INTRAMUSCULAR; INTRAVENOUS at 04:35

## 2020-10-28 RX ADMIN — TRAMADOL HYDROCHLORIDE 50 MG: 50 TABLET, FILM COATED ORAL at 02:46

## 2020-10-28 RX ADMIN — PANTOPRAZOLE SODIUM 40 MG: 40 TABLET, DELAYED RELEASE ORAL at 09:57

## 2020-10-28 RX ADMIN — HYDROCHLOROTHIAZIDE 25 MG: 25 TABLET ORAL at 09:57

## 2020-10-28 RX ADMIN — FUROSEMIDE 20 MG: 20 TABLET ORAL at 09:57

## 2020-10-28 RX ADMIN — POTASSIUM CHLORIDE 40 MEQ: 1500 TABLET, EXTENDED RELEASE ORAL at 09:57

## 2020-10-28 RX ADMIN — MORPHINE SULFATE 2 MG: 2 INJECTION, SOLUTION INTRAMUSCULAR; INTRAVENOUS at 00:43

## 2020-10-28 RX ADMIN — TAMSULOSIN HYDROCHLORIDE 0.8 MG: 0.4 CAPSULE ORAL at 09:57

## 2020-10-28 ASSESSMENT — PAIN SCALES - GENERAL
PAINLEVEL_OUTOF10: 8
PAINLEVEL_OUTOF10: 3
PAINLEVEL_OUTOF10: 7
PAINLEVEL_OUTOF10: 6
PAINLEVEL_OUTOF10: 1
PAINLEVEL_OUTOF10: 1
PAINLEVEL_OUTOF10: 8
PAINLEVEL_OUTOF10: 1
PAINLEVEL_OUTOF10: 7
PAINLEVEL_OUTOF10: 1

## 2020-10-28 ASSESSMENT — PAIN DESCRIPTION - FREQUENCY: FREQUENCY: CONTINUOUS

## 2020-10-28 ASSESSMENT — PAIN DESCRIPTION - DESCRIPTORS: DESCRIPTORS: DISCOMFORT;SHARP;TENDER

## 2020-10-28 ASSESSMENT — PAIN DESCRIPTION - PAIN TYPE: TYPE: SURGICAL PAIN

## 2020-10-28 ASSESSMENT — PAIN DESCRIPTION - ORIENTATION: ORIENTATION: RIGHT;LEFT;MID

## 2020-10-28 ASSESSMENT — PAIN DESCRIPTION - ONSET: ONSET: ON-GOING

## 2020-10-28 ASSESSMENT — PAIN DESCRIPTION - LOCATION: LOCATION: ABDOMEN

## 2020-10-28 ASSESSMENT — PAIN DESCRIPTION - PROGRESSION: CLINICAL_PROGRESSION: GRADUALLY IMPROVING

## 2020-10-28 NOTE — DISCHARGE SUMMARY
Discharge summary  Fabián De Jesus  [id number]    Admit date: 10/27/2020    Discharge date and time: 10/28/2020     Admitting Physician: Katie Moscoso MD     Admission Diagnoses:   Patient Active Problem List   Diagnosis Code    Rectal cancer metastasized to liver ([facility] [address] C20, C78.7    Calculus of gallbladder with acute on chronic cholecystitis without obstruction K80.12    Hypocalcemia E83.51    Bone metastasis ([facility] [address] C79.51    Thoracic disc herniation M51.24    Chemotherapy-induced neutropenia ([facility] [address] D70.1, T45.1X5A    Encounter for antineoplastic chemotherapy  Z51.11    Acute GI bleeding K92.2    Acute cholecystitis K81.0       Hospital Course: Fabián De Jesus is a 69 y.o. male post laparoscopic cholecystectomy with normal cholangiogram 10/27/20. He could no void post op so he was given Flomax, still couldn't go so he was straight cathed, could not void later so a álvarez was placed. abdominal pain controlled, is walking well. Labs normal. Álvarez was will be left in place until he sees urology. He is tolerating the diet with no nausea. Incisions all clean and dry. Medication List      START taking these medications    HYDROcodone-acetaminophen 5-325 MG per tablet  Commonly known as:  Norco  Take 1 tablet by mouth every 4 hours as needed for Pain for up to 3 days.      tamsulosin 0.4 MG capsule  Commonly known as:  FLOMAX  Take 2 capsules by mouth daily  Start taking on:  October 29, 2020        CHANGE how you take these medications    magnesium oxide 400 (240 Mg) MG tablet  Commonly known as:  MAG-OX  Take 1 tablet by mouth 2 times daily  What changed:  when to take this        CONTINUE taking these medications    acetaminophen 500 MG tablet  Commonly known as:  TYLENOL     b complex vitamins capsule     diphenoxylate-atropine 2.5-0.025 MG per tablet  Commonly known as:  LOMOTIL     furosemide 20 MG tablet  Commonly known as:  LASIX     hydroCHLOROthiazide 25 MG tablet  Commonly known as: HYDRODIURIL  Take 1 tablet by mouth daily     hydrocortisone 2.5 % rectal cream  Commonly known as: Anusol-HC  Use 3-4 times daily. Do not use internally. External use only. lactulose 10 GM/15ML solution  Commonly known as:  CHRONULAC     loratadine 10 MG tablet  Commonly known as:  CLARITIN     ondansetron 4 MG tablet  Commonly known as:  Zofran  Take 1 tablet by mouth every 8 hours as needed for Nausea or Vomiting     pantoprazole 40 MG tablet  Commonly known as:  PROTONIX     polyethylene glycol 17 g packet  Commonly known as:  GLYCOLAX     potassium chloride 20 MEQ extended release tablet  Commonly known as:  KLOR-CON M           Where to Get Your Medications      These medications were sent to [facility]/pharmacy #[id number] Damien, [address]  [address] [place] [address]    Phone:  [phone]   · tamsulosin 0.4 MG capsule     These medications were sent to [address] [place] [address] [facility] [phone]  [facility] [address], [facility] [address]    Phone:  [phone]   · HYDROcodone-acetaminophen 5-325 MG per tablet         Lab Results   Component Value Date    WBC 4.2 10/28/2020    HGB 7.8 10/28/2020    PLT 61 10/28/2020     10/28/2020     10/28/2020    K 4.3 10/28/2020    K 4.3 10/20/2020    BUN 26 10/28/2020    CREATININE 1.1 10/28/2020    GLUCOSE 141 10/28/2020    LABGLOM >60 10/28/2020    PROTIME 13.1 05/11/2020    INR 1.1 05/11/2020    LABALBU 3.2 10/28/2020    PROT 5.9 10/28/2020    CALCIUM 7.9 10/28/2020    MG 0.8 05/15/2020    BILITOT 0.9 10/28/2020    BILIDIR <0.2 06/13/2016    ALKPHOS 124 10/28/2020    AST 44 10/28/2020    ALT 23 10/28/2020       Discharge Exam:   VITALS: Blood pressure 117/65, pulse 59, temperature 97.8 °F (36.6 °C), temperature source Oral, resp. rate 16, height 6' 1\" (1.854 m), weight 192 lb (87.1 kg), SpO2 99 %.   General appearance: alert, appears stated age and cooperative  Head: Normocephalic, without obvious abnormality, atraumatic  Neck: no adenopathy, no carotid bruit, no JVD, supple, symmetrical, trachea midline and thyroid not enlarged, symmetric, no tenderness/mass/nodules  Lungs: clear to auscultation bilaterally  Heart: regular rate and rhythm  Abdomen: soft, incisions clean and dry, glue in place  Extremities: extremities normal, atraumatic, no cyanosis or edema    Patient Instructions: Activity: ambulate frequently, no strenuous activity  Diet: regular diet  Wound Care: shower and leave the incisions open to air, do not rub off the surgical glue. Make sure the bowels move daily by using fiber, stool softeners or laxatives to prevent constipation pains.     Condition at discharge:  Stable  Disposition: home  Follow up with in 2 days for álvarez removal.    Signed:  Reuben Joiner  10/28/2020  1:21 PM

## 2020-10-28 NOTE — PROGRESS NOTES
GENERAL SURGERY  DAILY PROGRESS NOTE  10/28/2020    No chief complaint on file. Subjective:  Urinary retention overnight, straight cath x 1, álvarez placed this AM. Port hematoma resolved pain is controlled. No nausea or vomiting.      Objective:  BP (!) 151/69   Pulse 73   Temp 98 °F (36.7 °C) (Oral)   Resp 17   Ht 6' 1\" (1.854 m)   Wt 192 lb (87.1 kg)   SpO2 96%   BMI 25.33 kg/m²     GENERAL:  Laying in bed, awake, alert, cooperative, no apparent distress  HEAD: Normocephalic, atraumatic  EYES: No sclera icterus, pupils equal  LUNGS:  No increased work of breathing  CARDIOVASCULAR:  RR  ABDOMEN:  Soft, approprietly tender, non-distended, incisions c/d/i,   : Álvarez in place, yellow urine  EXTREMITIES: No edema or swelling  SKIN: Warm and dry    Assessment/Plan:  69 y.o. male POD1 laparoscopic robotic assisted cholecystectomy with post-operative abdominal wall hematoma and urinary retention     Continue Flomax  Pain control  Regular diet  Likely discharge today    Electronically signed by Cory Mittal MD on 10/28/2020 at 7:06 AM

## 2020-10-28 NOTE — CARE COORDINATION
CM note: Met with patient for transition of care planning. Pt lives with fiance, is independent with ADLs, still drives. PCP is Dr. Caroline Cummings and he utilizes [facility] in [place] for medications. Pt currently has a urinary catheter in for urinary retention post OR, states he does not feel he will need HHC as long as he is shown how to care/empty. States he was active with an [facility] provider prior to Covid-19 for chemotherapy infusions but he must now go to [facility] in [place] for initiation of his infusions as the [facility] provider is no longer sending a nurse to his home to do this. Pt denies any discharge needs, family will provide transportation at discharge.

## 2020-10-29 NOTE — CARE COORDINATION
CM note: discharge follow up call placed, patient states he is a little sore and tired, he is waiting for urology office to call him back with an appointment date/time to get his catheter removed. States he has no issues/questions.   Electronically signed by Lien Harvey RN on 10/29/2020 at 2:25 PM

## 2020-10-30 ENCOUNTER — TELEPHONE (OUTPATIENT)
Dept: SURGERY | Age: 70
End: 2020-10-30

## 2020-10-30 NOTE — TELEPHONE ENCOUNTER
Patient called to advise office that he has an appointment with Urology on 11/09/2020. Note routed to MA.     Electronically signed by Kell Roche MA on 10/30/2020 at 3:36 PM

## 2020-11-13 ENCOUNTER — OFFICE VISIT (OUTPATIENT)
Dept: SURGERY | Age: 70
End: 2020-11-13

## 2020-11-13 VITALS
SYSTOLIC BLOOD PRESSURE: 135 MMHG | DIASTOLIC BLOOD PRESSURE: 64 MMHG | TEMPERATURE: 98.6 F | HEART RATE: 65 BPM | HEIGHT: 73 IN | BODY MASS INDEX: 25.45 KG/M2 | WEIGHT: 192 LBS

## 2020-11-13 PROCEDURE — 99024 POSTOP FOLLOW-UP VISIT: CPT | Performed by: SURGERY

## 2020-11-13 RX ORDER — TRAMADOL HYDROCHLORIDE 50 MG/1
TABLET ORAL
COMMUNITY
Start: 2020-10-01

## 2020-11-13 RX ORDER — MAGNESIUM OXIDE 400 MG/1
TABLET ORAL
COMMUNITY
Start: 2020-11-05 | End: 2021-05-21 | Stop reason: SDUPTHER

## 2020-11-13 NOTE — PROGRESS NOTES
Zada Romberg  11/13/2020  [address]     Post-Operative Follow-up. Zada Romberg is a 69 y.o. male post laparoscopic cholecystectomy with normal cholangiogram 10/27/20. He could not void post op so he was given Flomax, still couldn't go so he was straight cathed, could not void later so a álvarez was placed and he was discharged with the catheter. Catheter was removed in Dr. Zenaida Rodgers office, voiding well on Flomax. Getting ready to start chemotherapy again. Wounds healing well, no pain, no cellulitis, glue in place. History: PMHx of rectal cancer with liver and lung mets that has resolved with chemotherapy through left chest mediport. Dr Raulito Montenegro (oncology) sent pt here due to recent gallstones and gallbladder wall edema on CT scan (9/2/2020) and recommend pt follow up for cholecystectomy. Weight: 192 lb (87.1 kg). Past Medical History:   Diagnosis Date    Arthritis     Cancer ([facility] [address]     colon    Depression     History of blood transfusion     [facility]    Hyperlipidemia     Hypertension      Past Surgical History:   Procedure Laterality Date    CHOLECYSTECTOMY, LAPAROSCOPIC N/A 10/27/2020    CHOLECYSTECTOMY LAPAROSCOPIC WITH IOC, ROBOT XI ASSISTED, POSS OPEN performed by Shyanne Callahan MD at [address]  11/2/15    bx, rectal mass    FRACTURE SURGERY      KNEE ARTHROSCOPY Right     LIVER BIOPSY  11/12/15    SKIN FULL GRAFT      on finger    TONSILLECTOMY Bilateral     TUNNELED CENTRAL VENOUS CATHETER W/ SUBCUTANEOUS PORT Left        Home Medications  Prior to Visit Medications    Medication Sig Taking?  Authorizing Provider   magnesium oxide (MAG-OX) 400 MG tablet TAKE 1 TABLET BY MOUTH EVERY DAY Yes Historical Provider, MD   traMADol (ULTRAM) 50 MG tablet TAKE 1 TABLET BY MOUTH EVERY 4 TO 6 HOURS AS NEEDED FOR PAIN Yes Historical Provider, MD   tamsulosin (FLOMAX) 0.4 MG capsule Take 2 capsules by mouth daily Yes Diaz Schneider cooperative, does ambulate easily  Head: Normocephalic, without obvious abnormality, atraumatic  Eyes: PERRL  Ears/mouth/throat:  Ears clear, mouth normal, throat no redness  Neck: no adenopathy, no JVD, supple, symmetrical, trachea midline and thyroid not enlarged  Lungs: clear to auscultation bilaterally  Heart: regular rate and rhythm  Abdomen: incisions healing well, glue in place  Extremities: extremities normal, atraumatic, no cyanosis or edema  Skin: no open wounds    Assessment:    Doing well post lap victoria. Plan:   Ok to start chemotherapy. Keep the bowels soft and moving daily. Call if needed.     Physician Signature: Electronically signed by Dr. Marcianne Favre MD

## 2020-11-16 ENCOUNTER — HOSPITAL ENCOUNTER (OUTPATIENT)
Age: 70
Discharge: HOME | End: 2020-11-16
Payer: MEDICARE

## 2020-11-16 LAB
ALBUMIN SERPL-MCNC: 3.3 G/DL (ref 3.5–5.2)
ALP BLD-CCNC: 176 U/L (ref 40–129)
ALT SERPL-CCNC: 16 U/L (ref 0–40)
ANION GAP SERPL CALCULATED.3IONS-SCNC: 9 MMOL/L (ref 7–16)
AST SERPL-CCNC: 25 U/L (ref 0–39)
BASOPHILS ABSOLUTE: 0.05 E9/L (ref 0–0.2)
BASOPHILS RELATIVE PERCENT: 1.7 % (ref 0–2)
BILIRUB SERPL-MCNC: 0.7 MG/DL (ref 0–1.2)
BUN BLDV-MCNC: 23 MG/DL (ref 8–23)
CALCIUM SERPL-MCNC: 8.8 MG/DL (ref 8.6–10.2)
CEA: 3.6 NG/ML (ref 0–5.2)
CHLORIDE BLD-SCNC: 106 MMOL/L (ref 98–107)
CO2: 24 MMOL/L (ref 22–29)
CREAT SERPL-MCNC: 1 MG/DL (ref 0.7–1.2)
EOSINOPHILS ABSOLUTE: 0.12 E9/L (ref 0.05–0.5)
EOSINOPHILS RELATIVE PERCENT: 4.3 % (ref 0–6)
GFR AFRICAN AMERICAN: >60
GFR NON-AFRICAN AMERICAN: >60 ML/MIN/1.73
GLUCOSE BLD-MCNC: 124 MG/DL (ref 74–99)
HCT VFR BLD CALC: 26.7 % (ref 37–54)
HEMOGLOBIN: 8.7 G/DL (ref 12.5–16.5)
LYMPHOCYTES ABSOLUTE: 0.38 E9/L (ref 1.5–4)
LYMPHOCYTES RELATIVE PERCENT: 13 % (ref 20–42)
MCH RBC QN AUTO: 31.3 PG (ref 26–35)
MCHC RBC AUTO-ENTMCNC: 32.6 % (ref 32–34.5)
MCV RBC AUTO: 96 FL (ref 80–99.9)
MONOCYTES ABSOLUTE: 0.14 E9/L (ref 0.1–0.95)
MONOCYTES RELATIVE PERCENT: 5.2 % (ref 2–12)
MYELOCYTE PERCENT: 0.9 % (ref 0–0)
NEUTROPHILS ABSOLUTE: 2.2 E9/L (ref 1.8–7.3)
NEUTROPHILS RELATIVE PERCENT: 74.8 % (ref 43–80)
OVALOCYTES: ABNORMAL
PDW BLD-RTO: 15.9 FL (ref 11.5–15)
PLATELET # BLD: 86 E9/L (ref 130–450)
PLATELET CONFIRMATION: NORMAL
PMV BLD AUTO: 10.1 FL (ref 7–12)
POIKILOCYTES: ABNORMAL
POTASSIUM SERPL-SCNC: 3.9 MMOL/L (ref 3.5–5)
RBC # BLD: 2.78 E12/L (ref 3.8–5.8)
SODIUM BLD-SCNC: 139 MMOL/L (ref 132–146)
TOTAL PROTEIN: 6.3 G/DL (ref 6.4–8.3)
WBC # BLD: 2.9 E9/L (ref 4.5–11.5)

## 2020-11-16 PROCEDURE — 82378 CARCINOEMBRYONIC ANTIGEN: CPT

## 2020-11-16 PROCEDURE — 85025 COMPLETE CBC W/AUTO DIFF WBC: CPT

## 2020-11-16 PROCEDURE — 36415 COLL VENOUS BLD VENIPUNCTURE: CPT

## 2020-11-16 PROCEDURE — 80053 COMPREHEN METABOLIC PANEL: CPT

## 2020-11-17 ENCOUNTER — OFFICE VISIT (OUTPATIENT)
Dept: ONCOLOGY | Age: 70
End: 2020-11-17
Payer: MEDICARE

## 2020-11-17 ENCOUNTER — TELEPHONE (OUTPATIENT)
Dept: INFUSION THERAPY | Age: 70
End: 2020-11-17

## 2020-11-17 ENCOUNTER — HOSPITAL ENCOUNTER (OUTPATIENT)
Dept: INFUSION THERAPY | Age: 70
Discharge: HOME | End: 2020-11-17
Payer: MEDICARE

## 2020-11-17 VITALS
SYSTOLIC BLOOD PRESSURE: 139 MMHG | WEIGHT: 201 LBS | TEMPERATURE: 97 F | HEART RATE: 67 BPM | BODY MASS INDEX: 26.52 KG/M2 | DIASTOLIC BLOOD PRESSURE: 68 MMHG | OXYGEN SATURATION: 100 %

## 2020-11-17 VITALS
SYSTOLIC BLOOD PRESSURE: 134 MMHG | RESPIRATION RATE: 18 BRPM | TEMPERATURE: 98.1 F | DIASTOLIC BLOOD PRESSURE: 67 MMHG | HEART RATE: 68 BPM

## 2020-11-17 DIAGNOSIS — C78.7 RECTAL CANCER METASTASIZED TO LIVER (HCC): Primary | ICD-10-CM

## 2020-11-17 DIAGNOSIS — C20 RECTAL CANCER METASTASIZED TO LIVER (HCC): Primary | ICD-10-CM

## 2020-11-17 DIAGNOSIS — C79.51 BONE METASTASIS (HCC): ICD-10-CM

## 2020-11-17 PROCEDURE — 96415 CHEMO IV INFUSION ADDL HR: CPT

## 2020-11-17 PROCEDURE — 96368 THER/DIAG CONCURRENT INF: CPT

## 2020-11-17 PROCEDURE — 96413 CHEMO IV INFUSION 1 HR: CPT

## 2020-11-17 PROCEDURE — 2580000003 HC RX 258: Performed by: INTERNAL MEDICINE

## 2020-11-17 PROCEDURE — 96375 TX/PRO/DX INJ NEW DRUG ADDON: CPT

## 2020-11-17 PROCEDURE — 6360000002 HC RX W HCPCS: Performed by: INTERNAL MEDICINE

## 2020-11-17 PROCEDURE — 96411 CHEMO IV PUSH ADDL DRUG: CPT

## 2020-11-17 PROCEDURE — 99214 OFFICE O/P EST MOD 30 MIN: CPT | Performed by: INTERNAL MEDICINE

## 2020-11-17 RX ORDER — DEXAMETHASONE SODIUM PHOSPHATE 10 MG/ML
10 INJECTION INTRAMUSCULAR; INTRAVENOUS ONCE
Status: COMPLETED | OUTPATIENT
Start: 2020-11-17 | End: 2020-11-17

## 2020-11-17 RX ORDER — ATROPINE SULFATE 0.4 MG/ML
0.4 AMPUL (ML) INJECTION ONCE
Status: COMPLETED | OUTPATIENT
Start: 2020-11-17 | End: 2020-11-17

## 2020-11-17 RX ORDER — HEPARIN SODIUM (PORCINE) LOCK FLUSH IV SOLN 100 UNIT/ML 100 UNIT/ML
500 SOLUTION INTRAVENOUS PRN
Status: CANCELLED | OUTPATIENT
Start: 2020-11-17

## 2020-11-17 RX ORDER — 0.9 % SODIUM CHLORIDE 0.9 %
10 VIAL (ML) INJECTION ONCE
Status: CANCELLED | OUTPATIENT
Start: 2020-11-17

## 2020-11-17 RX ORDER — SODIUM CHLORIDE 9 MG/ML
INJECTION, SOLUTION INTRAVENOUS CONTINUOUS
Status: CANCELLED | OUTPATIENT
Start: 2020-11-17

## 2020-11-17 RX ORDER — FLUOROURACIL 50 MG/ML
850 INJECTION, SOLUTION INTRAVENOUS ONCE
Status: COMPLETED | OUTPATIENT
Start: 2020-11-17 | End: 2020-11-17

## 2020-11-17 RX ORDER — METHYLPREDNISOLONE SODIUM SUCCINATE 125 MG/2ML
125 INJECTION, POWDER, LYOPHILIZED, FOR SOLUTION INTRAMUSCULAR; INTRAVENOUS ONCE
Status: CANCELLED | OUTPATIENT
Start: 2020-11-17

## 2020-11-17 RX ORDER — EPINEPHRINE 1 MG/ML
0.3 INJECTION, SOLUTION, CONCENTRATE INTRAVENOUS PRN
Status: CANCELLED | OUTPATIENT
Start: 2020-11-17

## 2020-11-17 RX ORDER — SODIUM CHLORIDE 9 MG/ML
250 INJECTION, SOLUTION INTRAVENOUS CONTINUOUS
Status: DISCONTINUED | OUTPATIENT
Start: 2020-11-17 | End: 2020-11-18 | Stop reason: HOSPADM

## 2020-11-17 RX ORDER — SODIUM CHLORIDE 9 MG/ML
250 INJECTION, SOLUTION INTRAVENOUS CONTINUOUS
Status: CANCELLED | OUTPATIENT
Start: 2020-11-17

## 2020-11-17 RX ORDER — PALONOSETRON HYDROCHLORIDE 0.05 MG/ML
0.25 INJECTION, SOLUTION INTRAVENOUS ONCE
Status: COMPLETED | OUTPATIENT
Start: 2020-11-17 | End: 2020-11-17

## 2020-11-17 RX ORDER — HEPARIN SODIUM (PORCINE) LOCK FLUSH IV SOLN 100 UNIT/ML 100 UNIT/ML
500 SOLUTION INTRAVENOUS PRN
Status: DISCONTINUED | OUTPATIENT
Start: 2020-11-17 | End: 2020-11-18 | Stop reason: HOSPADM

## 2020-11-17 RX ORDER — PALONOSETRON HYDROCHLORIDE 0.05 MG/ML
0.25 INJECTION, SOLUTION INTRAVENOUS ONCE
Status: CANCELLED | OUTPATIENT
Start: 2020-11-17

## 2020-11-17 RX ORDER — SODIUM CHLORIDE 0.9 % (FLUSH) 0.9 %
10 SYRINGE (ML) INJECTION PRN
Status: DISCONTINUED | OUTPATIENT
Start: 2020-11-17 | End: 2020-11-18 | Stop reason: HOSPADM

## 2020-11-17 RX ORDER — DEXAMETHASONE SODIUM PHOSPHATE 10 MG/ML
10 INJECTION, SOLUTION INTRAMUSCULAR; INTRAVENOUS ONCE
Status: CANCELLED | OUTPATIENT
Start: 2020-11-17

## 2020-11-17 RX ORDER — DIPHENHYDRAMINE HYDROCHLORIDE 50 MG/ML
50 INJECTION INTRAMUSCULAR; INTRAVENOUS ONCE
Status: CANCELLED | OUTPATIENT
Start: 2020-11-17

## 2020-11-17 RX ORDER — ATROPINE SULFATE 0.4 MG/ML
0.4 AMPUL (ML) INJECTION ONCE
Status: CANCELLED | OUTPATIENT
Start: 2020-11-17

## 2020-11-17 RX ORDER — FLUOROURACIL 50 MG/ML
850 INJECTION, SOLUTION INTRAVENOUS ONCE
Status: CANCELLED | OUTPATIENT
Start: 2020-11-17

## 2020-11-17 RX ORDER — SODIUM CHLORIDE 0.9 % (FLUSH) 0.9 %
10 SYRINGE (ML) INJECTION PRN
Status: CANCELLED | OUTPATIENT
Start: 2020-11-17

## 2020-11-17 RX ADMIN — SODIUM CHLORIDE 400 MG: 9 INJECTION, SOLUTION INTRAVENOUS at 09:42

## 2020-11-17 RX ADMIN — DENOSUMAB 120 MG: 120 INJECTION SUBCUTANEOUS at 09:49

## 2020-11-17 RX ADMIN — SODIUM CHLORIDE 250 ML: 9 INJECTION, SOLUTION INTRAVENOUS at 08:57

## 2020-11-17 RX ADMIN — LEUCOVORIN CALCIUM 850 MG: 350 INJECTION, POWDER, LYOPHILIZED, FOR SOLUTION INTRAMUSCULAR; INTRAVENOUS at 09:42

## 2020-11-17 RX ADMIN — PALONOSETRON 0.25 MG: 0.25 INJECTION, SOLUTION INTRAVENOUS at 08:59

## 2020-11-17 RX ADMIN — ATROPINE SULFATE 0.4 MG: 0.4 INJECTION, SOLUTION INTRAMUSCULAR; INTRAVENOUS; SUBCUTANEOUS at 09:30

## 2020-11-17 RX ADMIN — SODIUM CHLORIDE, PRESERVATIVE FREE 10 ML: 5 INJECTION INTRAVENOUS at 11:36

## 2020-11-17 RX ADMIN — FLUOROURACIL 850 MG: 50 INJECTION, SOLUTION INTRAVENOUS at 11:35

## 2020-11-17 RX ADMIN — Medication 500 UNITS: at 11:36

## 2020-11-17 RX ADMIN — DEXAMETHASONE SODIUM PHOSPHATE 10 MG: 10 INJECTION INTRAMUSCULAR; INTRAVENOUS at 09:01

## 2020-11-17 NOTE — PROGRESS NOTES
Patient had surgery 3 weeks ago. Per package insert, Avastin should be held for at least 4 weeks (and surgical wound is fully healed) after surgery. Will hold Avastin today and resume with next cycle in 2 weeks.

## 2020-11-17 NOTE — PROGRESS NOTES
Patient presents to clinic today for Xgeva injection. Patient's labs monitored, specifically, Calcium level, to ensure no increased risk of hypocalcemia with administration of the medication. Patient's calcium level is 8.8 mg/dL. Patient received therapy and will continue to be monitored prior to each dose.     Reynaldo Mckinney, [address] 11/17/2020 9:00 AM

## 2020-11-17 NOTE — TELEPHONE ENCOUNTER
Confirmed with Tonya Willams, from [place] [address], [phone], that patient's is receiving chemotherapy today and will need pump in [place] for connect by [address] [place] [address]. He voiced understanding and requested current lab results and orders to be faxed to [phone], of which office has received confirmation that fax went through.

## 2020-11-17 NOTE — PROGRESS NOTES
detected. 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     CEA 3488. Bone scan on 11/10/2015 noted no metastatic disease. CT chest on 11/10/2015 revealed Multiple pulmonary nodules in the upper and lower lobes consistent with metastatic disease;   Hepatic metastasis also visualized. 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 Mediport was placed. Cycle # 1 of FOLFOX + Avastin was on 11/23/2015. CEA was 4555 on 11/23/2015. Cycle # 2 of FOLFOX + Avastin was on 12/08/2015. CEA was 3160 on 12/08/2015. Cycle # 3 of FOLFOX + Avastin was on 12/22/2015. CEA was 2516 on 12/21/2015. Cycle # 4 of FOLFOX + Avastin was on 01/05/2016. CEA was 2098 on 01/05/2015. Cycle # 5 of FOLFOX + Avastin was on 01/19/2016. CEA was 1511 on 01/05/2015.     -Bone scan on 01/26/2016 noted no metastatic disease. CT chest on 01/26/2016 revealed Significant response to treatment with no visible residual nodules. CT scan abdomen/pelvis on 01/26/2016 noted Interval decreased size of multiple masses in the liver compatible with treatment response. Continue another 2 months of FOLFOX + Avastin and repeat scans. Cycle # 6 of FOLFOX + Avastin was on 02/02/2016.  on 02/02/2016. Cycle # 7 of FOLFOX + Avastin was on 02/16/2016.  on 02/16/2016. Cycle # 8 of FOLFOX + Avastin was on 03/01/2016.  on 03/01/2016. Cycle # 9 of FOLFOX + Avastin was on 03/15/2016.  on 03/15/2016. Cycle # 10 of FOLFOX + Avastin was on 03/29/2016. .4 on 03/29/2016. Cycle # 11 of FOLFOX + Avastin was on 04/12/2016. .4 on 04/12/2016.     Re-staging scans on 04/19/2016: CT Chest: clear lungs; no evidence of recurring pulmonary nodule; CT Abdomen/Pelvis: Further interval decrease in size of the multiple metastatic hepatic lesions, the largest lesion now measures 3.9 x 3.5 cm and previously measured 4.5 cm in maximum diameter.  No mesenteric lymphadenopathy is identified; Bone Scan: No evidence of osseous metastasis. Continue same regimen and re-stage in 2-3 months. Cycle # 12 FOLFOX + Avastin was on 04/26/2016. .5 on 04/26/2016. Cycle # 13 FOLFOX + Avastin was on 05/10/2016. .3 on 05/10/2016. Cycle # 14 FOLFOX+AVASTIN was on 05/24/2016. .5 on 05/24/2016. Cycle # 15 FOLFOX + Avastin was on 06/07/2016. CEA 90.5 on 06/07/2016. Admitted to [facility] 06/13/2016-06/16/2016 for abdominal pain: EGD noted 1.5 cm clean based duodenal bulb ulceration s/p epinephrine and bicap per Dr. Tayler Tam. No active bleeding. A. Stomach, biopsy: Mild chronic gastritis, immunostain negative for Helicobacter  B. Esophagus, biopsy: Gastric glandular mucosa with prominent intestinal metaplasia ([place]'s epithelium), negative for epithelial dysplasia, esophageal squamous mucosa not identified  Cycle # 16 FOLFOX (discontinued avastin (bevacizumab) given association of peptic ulcer disease and known association of GI perforation) was on 06/21/2016. Cycle # 17 FOLFOX was on 07/05/2016. CEA 52.6 on 07/19/2016. Cycle # 17 FOLFOX was on 07/05/2016. CEA 52.6 on 07/05/2016. CEA 47.6 on 07/19/2016.     Re-staging scans 07/19/2106: CT Chest negative for metastatic disease. CT Abdomen/Pelvis: Stable hepatic lesions; Question new mural thickening in the cecum. Bone Scan: New Let hip lesion suspicious for bone metastasis.     Increased CEA; new mural thickening in the cecum and new left hip lesion suspicious for bone metastasis are consistent with disease progression; He derived maximum benefit from FOLFOX/AVASTIN. D/C FOLFOX/AVASTIN. We recommended FOLFIRI second line therapy. Cycle # 1 FOLFIRI was on 08/02/2016. CEA 40.8 on 08/02/2016. Xgeva q4 weeks started on 08/02/2016. Cycle # 2 FOLFIRI was on 08/16/2016. CEA 31.7 on 08/16/2016. Cycle # 3 FOLFIRI (added Avastin) was on 08/30/2016 given that ulcers healed on EGD 08/15/2016 by Dr. Tyra Parisi; Protonix bid since avastin re-started.    Colonoscopy on 08/29/2016 (to look at the cecal area) unremarkable. CEA 26.7 on 08/30/2016. Cycle # 4 FOLFIRI/Avastin was on 09/13/2016. CEA 23.4 on 09/13/2016. Cycle # 5 FOLFIRI/Avastin was on 09/27/2016. CEA 22.3 on 09/27/2016. Cycle # 6 FOLFIRI/Avastin was on 10/11/2016. CEA 18.4 on 10/11/2016.      Bone scan 10/21/2016 stable bone metastasis; ? New lesion anterior left sixth rib likely interval healing fracture. CT abdomen/pelvis revealed stable hepatic metastasis. CT chest negative for metastatic disease. Continue FOLFIRI/Avastin and repeat scans in 3 months. Cycle # 7 FOLFIRI/Avastin was on 10/25/2016. CEA 16.1  Cycle # 8 FOLFIRI/Avastin was on 11/08/2016. CEA 15.7  Cycle # 9 FOLFIRI/Avastin was on 11/29/2016. CEA 13.6 on 11/29/2016. Cycle # 10 FOLFIRI/Avastin was on 12/13/2016. CEA 10.6 on 12/13/2016. Cycle # 11 FOLFIRI/Avastin was on 12/27/2016. CEA 11.1 on 12/27/2016. Cycle # 12 FOLFIRI/Avastin was on 01/10/2017. CEA 9.7 on 01/10/2017.       CT chest 01/23/2017 No new pulmonary nodule or lymphadenopathy. Patchy groundglass opacities within the left lower lobe, suggestive of infectious or inflammatory etiology. Followup CT chest in 3 months. Slight increased linear sclerosis along the left anterior sixth rib corresponding to an area of increased uptake on the prior bone scan from 10/21/2016, favored to be related to interval healing changes of a nondisplaced fracture. CT abdomen/pelvis on 01/23/2017 Redemonstration of multiple hepatic metastases, which are similar compared to the prior CT from 10/21/2016. Redemonstration of area of increased sclerosis along the right acetabulum suspicious for metastatic disease. Bone scan on 01/23/2017 Stable subtle uptake within the left proximal diaphysis, again suggestive of metastatic disease  Decreased subtle uptake within the medial right acetabulum.    Decreased uptake within the left anterior sixth rib corresponding to linear sclerosis on the recent CT, favored to be related to an joint rib fracture. Continue FOLFIRI + Avastin and repeat scans in 3 months. Cycle # 13 FOLFIRI + Avastin was on 01/24/2017. Cycle # 14 FOLFIRI + Avastin was on 02/07/2017. Cycle # 15 FOLFIRI + Avastin was on 02/21/2017. Cycle # 16 FOLFIRI + Avastin was on 03/07/2017. Cycle # 17 FOLFIRI + Avastin was on 03/21/2017. Cycle # 18 FOLFIRI + Avastin was on 04/04/2017. CT chest 04/17/2017 negative for metastatic disease. CT abdomen/pelvis 04/17/2017 Stable hypodense metastatic lesions within the liver. Stable area of increased sclerosis along the right acetabulum  Bone scan 04/17/2017 Stable subtle uptake within the left proximal femoral diaphysis; No definite abnormal uptake in the region of a sclerotic lesion along the posterior column of the right acetabulum noted on CT. Stable slight uptake within the left anterior sixth rib corresponding to linear sclerosis on the recent CT, favored to be related to a fracture. Continue FOLFIRI + Avastin and repeat scans in 3 months. Cycle # 19 FOLFIRI + Avastin was on 04/18/2017. Cycle # 20 FOLFIRI + Avastin was on 05/02/2017. Cycle # 21 FOLFIRI + Avastin was on 05/16/2017. Cycle # 22 FOLFIRI + Avastin was on 05/30/2017. Cycle # 23 FOLFIRI + Avastin was on 06/13/2017. Cycle # 24 FOLFIRI + Avastin was on 06/27/2017. Cycle # 25 FOLFIRI + Avastin was on 07/11/2017. Cycle # 26 FOLFIRI + Avastin was on 07/25/2017. Cycle # 27 FOLFIRI + Avastin was on 08/08/2017. Cycle # 28 FOLFIRI + Avastin was on 08/22/2017. Cycle # 29 FOLFIRI + Avastin was on 09/05/2017. Cycle # 30 FOLFIRI + Avastin was on 09/19/2017. Bone scan 09/26/2017 stable. CT abdomen/pelvis on 09/26/2017 Stable hypodense mass in the liver. Stable sclerotic lesion in the acetabulum. CT chest 09/26/2017 negative for metastatic disease. Cycle # 31 FOLFIRI + Avastin was on 10/03/2017. CEA 7.4 on 10/03/2017. Cycle # 32 FOLFIRI + Avastin was on 10/17/2017.  CEA 6.0 on Cycle # 50 FOLFIRI + Avastin was on 07/03/2018. Cycle # 51 FOLFIRI + Avastin was on 07/24/2018. Cycle # 52 FOLFIRI + Avastin was on 08/14/2018. Cycle # 53 FOLFIRI + Avastin was on 08/28/2018. CT chest 09/06/2018 noted interval decrease in size of LUCY measuring up to 4 mm, suggestive of treatment response. No mediastinal or hilar LN  CT abdomen/pelvis 09/06/2018 Slight interval increase in size of a previously noted metastatic lesion within the right hepatic lobe. This may be related to differences in phase of enhancement compared to the most recent study from 5/31/2018, the lesion remains decreased in size compared to the more previous studies. No abdominal, retroperitoneal, or pelvic lymphadenopathy. Continue FOLFIRI + Avastin and repeat scans in 2 months. Cycle # 54 FOLFIRI + Avastin was on 09/11/2018. Cycle # 55 FOLFIRI + Avastin was on 09/25/2018. Cycle # 56 FOLFIRI + Avastin was on 10/09/2018. Cycle # 57 FOLFIRI + Avastin was on 10/23/2018. CT chest 11/02/2018 stable 3 mm nodule within LUCY. No new right and left lung nodule. No mediastinal or osseous lesion. CT abdomen/pelvis 11/02/2018 stable metastatic disease to liver. No new metastatic disease identified. No mesenteric or retroperitoneal LN. No gross colonic lesion identified. Continue FOLFIRI + Avastin and repeat scans in 3 months. Cycle # 58 FOLFIRI + Avastin was on 11/06/2018. CEA 7.6 on 11/05/2018. Cycle # 59 FOLFIRI + Avastin was on 11/27/2018. CEA 6.9 on 11/26/2018. Cycle # 60 FOLFIRI + Avastin was on 12/11/2018. CEA 6.7 on 12/11/2018. Cycle # 61 FOLFIRI + Avastin was on 01/08/2019. CEA 5.6 on 01/07/2019. Cycle # 62 FOLFIRI + Avastin was on 01/29/2019. CEA 4.6 on 01/28/2019. Cycle # 63 FOLFIRI + Avastin was on 02/12/2019. CEA 4.3 on 02/11/2019. CT chest 02/21/2019 no evidence of metastatic disease. CT abdomen/pelvis 02/21/2019 stable hypodense liver lesions. No evidence of worsening metastatic disease.  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 Ordered MRI thoracic spine and referred to neurosurgery team.    Cycle # 64 FOLFIRI + Avastin was on 02/26/2019. CEA 4.7 on 02/25/2019. Cycle # 65 FOLFIRI + Avastin was on 03/12/2019. CEA 4.0 on 03/11/2019. Cycle # 66 FOLFIRI + Avastin was on 03/26/2019. CEA 4.7 on 03/25/2019. Cycle # 67 FOLFIRI + Avastin was on 04/09/2019. CEA 5.6 on 04/08/2019. Cycle # 68 FOLFIRI + Avastin was on 04/30/2019. CEA 4.9 on 04/29/2019. Cycle # 69 FOLFIRI + Avastin was on 05/14/2019. CEA 5.3 on 05/14/2019. CT chest 05/23/2019 stable small lung nodule with no evidence of metastatic disease. CT abdomen/pelvis 05/23/2019 Decrease conspicuity of the liver lesions. No new lesions seen. Stable splenomegaly. Continue FOLFIRI + Avastin and repeat scans in 3 months. Cycle # 70 FOLFIRI + Avastin was on 06/04/2019. CEA 4.8 on 06/03/2019. Cycle # 71 FOLFIRI + Avastin was on 06/18/2019. CEA 4.6 on 06/17/2019. Cycle # 72 FOLFIRI + Avastin was on 07/09/2019. CEA 4.3 on 07/08/2019. Cycle # 73 FOLFIRI + Avastin was on 07/30/2019. CEA 5.0 on 07/29/2019. Cycle # 74 FOLFIRI + Avastin was on 08/13/2019. CEA 5.0 on 08/12/2019. CT chest 08/20/2019 negative  CT abdomen/pelvis 08/20/2019 Previous identified hepatic lesions are not visualized on the current exam.  Continue FOLFIRI + Avastin and repeat scans in 3 months. Cycle # 75 FOLFIRI + Avastin was on 08/27/2019. CEA 5.0 on 08/26/2019. Cycle # 76 FOLFIRI + Avastin was on 09/17/2019. CEA 5.5 on 09/16/2019. Cycle # 77 FOLFIRI + Avastin was on 10/15/2019. CEA 4.6 on 10/15/2019. Cycle # 78 FOLFIRI + Avastin was on 10/29/2019. CEA 4.1 on 10/28/2019. Cycle # 79 FOLFIRI + Avastin was on 11/12/2019. CEA 4.3 on 11/12/2019. Cycle # 80 FOLFIRI + Avastin was on 12/10/2019. CEA 4.0 on 12/09/2019.   CT chest 12/19/2019 Stable 3 mm nodule within the left upper lobe, similar to the previous studies dating back to 11/2/2018, however decreased in size compared to the CT from 3/26/2018. No thoracic LN. CT abdomen/pelvis 12/19/2019 Previously described small hypodense lesions are more conspicuous on the current study compared to the recent study throughout the liver from 8/20/2019, however similar to the prior study from 2/21/2019. Findings may be related to differences in contrast opacification. No abdominal or pelvic lymphadenopathy. Continue FOLFIRI + Avastin and repeat scans in 2 months to f/u on liver lesions. Cycle # 81 FOLFIRI + Avastin was on 01/07/2020. CEA 3.8 on 01/06/2020. Cycle # 82 FOLFIRI + Avastin was on 01/21/2020. CEA 3.7 on 01/20/2020. Cycle # 83 FOLFIRI + Avastin was on 02/04/2020. CEA 4.1 on 02/03/2020. Cycle # 84 FOLFIRI + Avastin was on 02/18/2020. CEA 4.6 on 02/17/2020. EGD by Dr. Darya Ford 03/02/2020 showing no masses or lesions  Cycle # 85 FOLFIRI + Avastin was on 03/03/2020. CEA 7.4 on 03/02/2020. Cycle # 86 FOLFIRI + Avastin was on 03/17/2020. CEA 4.5 on 03/16/2020. Colonoscopy on 03/23/2020 by Dr. Darya aguero (records requested). Cycle # 87 FOLFIRI + Avastin was on 03/31/2020. CEA 4.1 on 03/30/2020. Cycle # 88 FOLFIRI + Avastin was on 04/21/2020. CEA 6.4 on 04/20/2020. Cycle # 89 FOLFIRI + Avastin was on 05/05/2020. CEA 5.8 on 05/04/2020. Cycle # 90 FOLFIRI + Avastin was on 06/02/2020. CEA 5.5 on 06/01/2020. Cycle # 91 FOLFIRI + Avastin was on 06/16/2020. CEA 5.6 on 06/15/2020. CT chest 06/23/2020 noted no metastatic disease in the chest.   CT abdomen/pelvis 06/23/2020 Stable small liver lesions measuring up to 5 mm since 2019.   22 mm round mass in segment 8 of the liver with central high density stable from December 2019), previously measuring up to 34 mm in 2017. No additional metastatic disease in the abdomen or pelvis. Small amount of ascites. Overall stable disease. Continue FOLFIRI + Avastin and repeat scans in 2-3 months. Cycle # 92 FOLFIRI + Avastin was on 07/07/2020.  CEA of FOLFOX + Avastin was on 11/23/2015. CEA was 4555 on 11/23/2015. Cycle # 2 of FOLFOX + Avastin was on 12/08/2015. CEA was 3160 on 12/08/2015. Cycle # 3 of FOLFOX + Avastin was on 12/22/2015. CEA was 2516 on 12/21/2015. Cycle # 4 of FOLFOX + Avastin was on 01/05/2016. CEA was 2098 on 01/05/2015. Cycle # 5 of FOLFOX + Avastin was on 01/19/2016. CEA was 1511 on 01/05/2015.     -Bone scan on 01/26/2016 noted no metastatic disease. CT chest on 01/26/2016 revealed Significant response to treatment with no visible residual nodules. CT scan abdomen/pelvis on 01/26/2016 noted Interval decreased size of multiple masses in the liver compatible with treatment response. Continue another 2 months of FOLFOX + Avastin and repeat scans. Cycle # 6 of FOLFOX + Avastin was on 02/02/2016.  on 02/02/2016. Cycle # 7 of FOLFOX + Avastin was on 02/16/2016.  on 02/16/2016. Cycle # 8 of FOLFOX + Avastin was on 03/01/2016.  on 03/01/2016. Cycle # 9 of FOLFOX + Avastin was on 03/15/2016.  on 03/15/2016. Cycle # 10 of FOLFOX + Avastin was on 03/29/2016. .4 on 03/29/2016. Cycle # 11 of FOLFOX + Avastin was on 04/12/2016. .4 on 04/12/2016.     Re-staging scans on 04/19/2016: CT Chest: clear lungs; no evidence of recurring pulmonary nodule; CT Abdomen/Pelvis: Further interval decrease in size of the multiple metastatic hepatic lesions, the largest lesion now measures 3.9 x 3.5 cm and previously measured 4.5 cm in maximum diameter. No mesenteric lymphadenopathy is identified; Bone Scan: No evidence of osseous metastasis. Continue same regimen and re-stage in 2-3 months. Cycle # 12 FOLFOX + Avastin was on 04/26/2016. .5 on 04/26/2016. Cycle # 13 FOLFOX + Avastin was on 05/10/2016. .3 on 05/10/2016. Cycle # 14 FOLFOX+AVASTIN was on 05/24/2016. .5 on 05/24/2016. Cycle # 15 FOLFOX + Avastin was on 06/07/2016. CEA 90.5 on 06/07/2016.    Admitted to [facility] 06/13/2016-06/16/2016 for abdominal pain: EGD noted 1.5 cm clean based duodenal bulb ulceration s/p epinephrine and bicap per Dr. Lesvia Valerio. No active bleeding. A. Stomach, biopsy: Mild chronic gastritis, immunostain negative for Helicobacter  B. Esophagus, biopsy: Gastric glandular mucosa with prominent ntestinal metaplasia ([place]'s epithelium), negative for epithelial dysplasia, esophageal squamous mucosa not identified     Cycle # 16 FOLFOX (discontinued avastin (bevacizumab) given association of peptic ulcer disease and known association of GI perforation.) was on 06/21/2016. CEA 47 on 06/21/2016. Cycle # 17 FOLFOX was on 07/05/2016. CEA 52.6 on 07/05/2016. CEA 47.6 on 07/19/2016.     Re-staging scans 07/19/2106: CT Chest negative for metastatic disease. CT Abdomen/Pelvis: Stable hepatic lesions; Question new mural thickening in the cecum. Bone Scan: New Let hip lesion suspicious for bone metastasis.     Increased CEA; new mural thickening in the cecum and new left hip lesion suspicious for bone metastasis are consistent with disease progression; He derived maximum benefit from FOLFOX/AVASTIN. D/C FOLFOX/AVASTIN. We recommended FOLFIRI second line therapy. Cycle # 1 FOLFIRI was on 08/02/2016. CEA 40.8 on 08/02/2016. Xgeva q4 weeks started on 08/02/2016. Cycle # 2 FOLFIRI was on 08/16/2016. CEA 31.7 on 08/16/2016. Cycle # 3 FOLFIRI (added Avastin) was on 08/30/2016 given that ulcers healed on EGD 08/15/2016 by Dr. Asad Sol; Protonix bid since avastin re-started. Colonoscopy on 08/29/2016 (to look at the cecal area) unremarkable. CEA 26.7 on 08/30/2016. Cycle # 4 FOLFIRI/Avastin was on 09/13/2016. CEA 23.4 on 09/13/2016. Cycle # 5 FOLFIRI/Avastin was on 09/27/2016. CEA 22.3 on 09/27/2016. Cycle # 6 FOLFIRI/Avastin was on 10/11/2016. CEA 18.4 on 10/11/2016.      Bone scan 10/21/2016 stable bone metastasis; ? New lesion anterior left sixth rib likely interval healing fracture.    CT abdomen/pelvis revealed stable hepatic negative for metastatic disease. CT abdomen/pelvis 04/17/2017 Stable hypodense metastatic lesions within the liver. Stable area of increased sclerosis along the right acetabulum  Bone scan 04/17/2017 Stable subtle uptake within the left proximal femoral diaphysis; No definite abnormal uptake in the region of a sclerotic lesion along the posterior column of the right acetabulum noted on CT. Stable slight uptake within the left anterior sixth rib corresponding to linear sclerosis on the recent CT, favored to be related to a fracture. Continue FOLFIRI + Avastin and repeat scans in 3 months. Cycle # 19 FOLFIRI + Avastin was on 04/18/2017. CEA 7.5 on 04/18/2017. Cycle # 20 FOLFIRI + Avastin was on 05/02/2017. CEA 7.7 on 05/02/2017. Cycle # 21 FOLFIRI + Avastin was on 05/16/2017. CEA 7.1 on 05/16/2017. Cycle # 22 FOLFIRI + Avastin was on 05/30/2017. CEA 8.2 on 05/30/2017. Cycle # 23 FOLFIRI + Avastin was on 06/13/2017. CEA 7.7 on 06/13/2017. Cycle # 24 FOLFIRI + Avastin was on 06/27/2017. CEA 6.6 on 06/27/2017. Re-staging scans 07/05/2017:  Bone scan stable proximal left femoral diaphysis, right acetabulum, and left anterior sixth rib lesions;  CT abdomen/pelvis stable hypodense metastatic lesions within the liver; CT chest without convincing evidence of metastatic disease. Cycle # 25 FOLFIRI + Avastin was on 07/11/2017. CEA 6.3 on 07/11/2017. Cycle # 26 FOLFIRI + Avastin was on 07/25/2017. CEA 7.3 on 07/25/2017. Cycle # 27 FOLFIRI + Avastin was on 08/08/2017. CEA 6.5 on 08/08/2017. Cycle # 28 FOLFIRI + Avastin was on 08/22/2017. CEA 5.9 on 08/22/2017. Cycle # 29 FOLFIRI + Avastin was on 09/05/2017. CEA 6.3 on 09/05/2017. Cycle # 30 FOLFIRI + Avastin was on 09/19/2017. CEA 6.3 on 09/19/2017. Bone scan 09/26/2017 stable. CT abdomen/pelvis on 09/26/2017 Stable hypodense mass in the liver. Stable sclerotic lesion in the acetabulum. CT chest 09/26/2017 negative for metastatic disease.   Cycle FOLFIRI + Avastin was on 11/27/2018. CEA 6.9 on 11/26/2018. Cycle # 60 FOLFIRI + Avastin was on 12/11/2018. CEA 6.7 on 12/11/2018. Cycle # 61 FOLFIRI + Avastin was on 01/08/2019. CEA 5.6 on 01/07/2019. Cycle # 62 FOLFIRI + Avastin was on 01/29/2019. CEA 4.6 on 01/28/2019. Cycle # 63 FOLFIRI + Avastin was on 02/12/2019. CEA 4.3 on 02/11/2019. CT chest 02/21/2019 no evidence of metastatic disease. CT abdomen/pelvis 02/21/2019 stable hypodense liver lesions. No evidence of worsening metastatic disease. 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 Ordered MRI thoracic spine and referred to neurosurgery team.  Cycle # 64 FOLFIRI + Avastin was on 02/26/2019. CEA 4.7 on 02/25/2019. Cycle # 65 FOLFIRI + Avastin was on 03/12/2019. CEA 4.0 on 03/11/2019. Cycle # 66 FOLFIRI + Avastin was on 03/26/2019. CEA 4.7 on 03/25/2019. Cycle # 67 FOLFIRI + Avastin was on 04/09/2019. CEA 5.6 on 04/08/2019. Cycle # 68 FOLFIRI + Avastin was on 04/30/2019. CEA 4.9 on 04/29/2019. Cycle # 69 FOLFIRI + Avastin was on 05/14/2019. CEA 5.3 on 05/14/2019. CT chest 05/23/2019 stable small lung nodule with no evidence of metastatic disease. CT abdomen/pelvis 05/23/2019 Decrease conspicuity of the liver lesions. No new lesions seen. Stable splenomegaly. Continue FOLFIRI + Avastin and repeat scans in 3 months. Cycle # 70 FOLFIRI + Avastin was on 06/04/2019. CEA 4.8 on 06/03/2019. Cycle # 71 FOLFIRI + Avastin was on 06/18/2019. CEA 4.6 on 06/17/2019. Cycle # 72 FOLFIRI + Avastin was on 07/09/2019. CEA 4.3 on 07/08/2019. Cycle # 73 FOLFIRI + Avastin was on 07/30/2019. CEA 5.0 on 07/29/2019. Cycle # 74 FOLFIRI + Avastin was on 08/13/2019. CEA 5.0 on 08/12/2019. CT chest 08/20/2019 negative  CT abdomen/pelvis 08/20/2019 Previous identified hepatic lesions are not visualized on the current exam.  Continue FOLFIRI + Avastin and repeat scans in 3 months. Cycle # 75 FOLFIRI + Avastin was on 08/27/2019. CEA 5.0 on 08/26/2019. Cycle # 76 FOLFIRI + Avastin was on 09/17/2019. CEA 5.5 on 09/16/2019. Cycle # 77 FOLFIRI + Avastin was on 10/15/2019. CEA 4.6 on 10/15/2019. Cycle # 78 FOLFIRI + Avastin was on 10/29/2019. CEA 4.1 on 10/28/2019. Cycle # 79 FOLFIRI + Avastin was on 11/12/2019. CEA 4.3 on 11/12/2019. Cycle # 80 FOLFIRI + Avastin was on 12/10/2019. CEA 4.0 on 12/09/2019. CT chest 12/19/2019 Stable 3 mm nodule within the left upper lobe, similar to the previous studies dating back to 11/2/2018, however decreased in size compared to the CT from 3/26/2018. No thoracic LN. CT abdomen/pelvis 12/19/2019 Previously described small hypodense lesions are more conspicuous on the current study compared to the recent study throughout the liver from 8/20/2019, however similar to the prior study from 2/21/2019. Findings may be related to differences in contrast opacification. No abdominal or pelvic lymphadenopathy. Continue FOLFIRI + Avastin and repeat scans in 2 months to f/u on liver lesions. Cycle # 81 FOLFIRI + Avastin was on 01/07/2020. CEA 3.8 on 01/06/2020. Cycle # 82 FOLFIRI + Avastin was on 01/21/2020. CEA 3.7 on 01/20/2020. Cycle # 83 FOLFIRI + Avastin was on 02/04/2020. CEA 4.1 on 02/03/2020. Cycle # 84 FOLFIRI + Avastin was on 02/18/2020. CEA 4.6 on 02/17/2020. CT chest 2/28/2020 no evidence of metastatic disease to the lungs and no mediastinal or hilar adenopathy. CT abdomen pelvis 2/28/2020 no enhancing lesions seen within the liver to suggest metastatic disease. Wall thickening of the rectosigmoid junction. Images reviewed. Continue FOLFIRI Avastin and repeat scans in 3 months  EGD by Dr. Gabriela Kunz 03/02/2020 showing no masses or lesions. Cycle # 85 FOLFIRI + Avastin was on 03/03/2020. CEA 7.4 on 03/02/2020. Cycle # 86 FOLFIRI + Avastin was on 03/17/2020. CEA 4.5 on 03/16/2020. Colonoscopy on 03/23/2020 by Dr. Gabriela aguero (records requested).     Cycle # 87 FOLFIRI + Avastin was on 03/31/2020. CEA 4.1 on 03/30/2020. Cycle # 88 FOLFIRI + Avastin was on 04/21/2020. CEA 6.4 on 04/20/2020. Cycle # 89 FOLFIRI + Avastin was on 05/05/2020. CEA 5.8 on 05/04/2020. Cycle # 90 FOLFIRI + Avastin was on 06/02/2020. CEA 5.5 on 06/01/2020. Cycle # 91 FOLFIRI + Avastin was on 06/16/2020. CEA 5.6 on 06/15/2020. CT chest 06/23/2020 noted no metastatic disease in the chest.   CT abdomen/pelvis 06/23/2020 Stable small liver lesions measuring up to 5 mm since 2019.   22 mm round mass in segment 8 of the liver with central high density stable from December 2019), previously measuring up to 34 mm in 2017. No additional metastatic disease in the abdomen or pelvis. Small amount of ascites. Overall stable disease. Continue FOLFIRI + Avastin and repeat scans in 2-3 months. Cycle # 92 FOLFIRI + Avastin was on 07/07/2020. CEA 5.7 on 07/06/2020. Cycle # 93 FOLFIRI + Avastin was on 07/21/2020. CEA 5.9 on 07/20/2020. Cycle # 94 FOLFIRI + Avastin was on 08/04/2020. CEA 5.5 on 08/04/2020. Cycle # 95 FOLFIRI + Avastin was on 08/25/2020. CEA 6.1 on 08/24/2020. CT chest 9/2/2020 stable unremarkable enhanced CT of the thorax. There is no metastatic disease to the lungs. CT abdomen pelvis on 9/2/2020 stable 2.2 cm low attenuated lesion within the central aspect of the right hepatic lobe favored to represent treated metastatic disease. No new hepatic lesion is identified. Stable splenomegaly  There is no appreciable colonic lesion or stricture. Pericholecystic fluid of uncertain etiology. Laparoscopic cholecystectomy with normal cholangiogram 10/27/20  Gallbladder: Chronic cholecystitis and cholelithiasis.  Unremarkable regional lymph node. 11/13/2020; Seen by Dr. Izabella Love from General surgery team; cleared to start chemotherapy. Cycle # 96 FOLFIRI + Avastin is today 11/17/2020. CEA 3.6 on 11/16/2020.     RTC 2 weeks for Cycle # 97 FOLFIRI + Avastin    MSI testing noted no mismatch repair protein loss of expression    11/17/2020  Valery Salinas MD  Board Certified Medical Oncologist

## 2020-11-19 ENCOUNTER — HOSPITAL ENCOUNTER (OUTPATIENT)
Dept: INFUSION THERAPY | Age: 70
Discharge: HOME | End: 2020-11-19
Payer: MEDICARE

## 2020-11-19 DIAGNOSIS — Z51.11 ENCOUNTER FOR ANTINEOPLASTIC CHEMOTHERAPY: Primary | ICD-10-CM

## 2020-11-19 DIAGNOSIS — C20 RECTAL CANCER METASTASIZED TO LIVER (HCC): ICD-10-CM

## 2020-11-19 DIAGNOSIS — C78.7 RECTAL CANCER METASTASIZED TO LIVER (HCC): ICD-10-CM

## 2020-11-19 PROCEDURE — 6360000002 HC RX W HCPCS: Performed by: INTERNAL MEDICINE

## 2020-11-19 PROCEDURE — 96372 THER/PROPH/DIAG INJ SC/IM: CPT

## 2020-11-19 RX ADMIN — PEGFILGRASTIM-CBQV 6 MG: 6 INJECTION, SOLUTION SUBCUTANEOUS at 14:08

## 2020-11-30 ENCOUNTER — HOSPITAL ENCOUNTER (OUTPATIENT)
Age: 70
Discharge: HOME | End: 2020-11-30
Payer: MEDICARE

## 2020-11-30 LAB
ALBUMIN SERPL-MCNC: 3.6 G/DL (ref 3.5–5.2)
ALP BLD-CCNC: 170 U/L (ref 40–129)
ALT SERPL-CCNC: 17 U/L (ref 0–40)
ANION GAP SERPL CALCULATED.3IONS-SCNC: 10 MMOL/L (ref 7–16)
ANISOCYTOSIS: ABNORMAL
AST SERPL-CCNC: 25 U/L (ref 0–39)
BASOPHILS ABSOLUTE: 0 E9/L (ref 0–0.2)
BASOPHILS RELATIVE PERCENT: 0.6 % (ref 0–2)
BILIRUB SERPL-MCNC: 0.6 MG/DL (ref 0–1.2)
BUN BLDV-MCNC: 20 MG/DL (ref 8–23)
CALCIUM SERPL-MCNC: 9.7 MG/DL (ref 8.6–10.2)
CEA: 3.5 NG/ML (ref 0–5.2)
CHLORIDE BLD-SCNC: 106 MMOL/L (ref 98–107)
CO2: 25 MMOL/L (ref 22–29)
CREAT SERPL-MCNC: 1.2 MG/DL (ref 0.7–1.2)
EOSINOPHILS ABSOLUTE: 0.09 E9/L (ref 0.05–0.5)
EOSINOPHILS RELATIVE PERCENT: 2.6 % (ref 0–6)
GFR AFRICAN AMERICAN: >60
GFR NON-AFRICAN AMERICAN: 60 ML/MIN/1.73
GLUCOSE BLD-MCNC: 158 MG/DL (ref 74–99)
HCT VFR BLD CALC: 27.3 % (ref 37–54)
HEMOGLOBIN: 8.9 G/DL (ref 12.5–16.5)
HYPOCHROMIA: ABNORMAL
LYMPHOCYTES ABSOLUTE: 0.6 E9/L (ref 1.5–4)
LYMPHOCYTES RELATIVE PERCENT: 17.4 % (ref 20–42)
MCH RBC QN AUTO: 31.4 PG (ref 26–35)
MCHC RBC AUTO-ENTMCNC: 32.6 % (ref 32–34.5)
MCV RBC AUTO: 96.5 FL (ref 80–99.9)
METAMYELOCYTES RELATIVE PERCENT: 0.9 % (ref 0–1)
MONOCYTES ABSOLUTE: 0.07 E9/L (ref 0.1–0.95)
MONOCYTES RELATIVE PERCENT: 1.7 % (ref 2–12)
MYELOCYTE PERCENT: 0.9 % (ref 0–0)
NEUTROPHILS ABSOLUTE: 2.73 E9/L (ref 1.8–7.3)
NEUTROPHILS RELATIVE PERCENT: 76.5 % (ref 43–80)
OVALOCYTES: ABNORMAL
PDW BLD-RTO: 16.5 FL (ref 11.5–15)
PLATELET # BLD: 88 E9/L (ref 130–450)
PLATELET CONFIRMATION: NORMAL
PMV BLD AUTO: 9.5 FL (ref 7–12)
POIKILOCYTES: ABNORMAL
POLYCHROMASIA: ABNORMAL
POTASSIUM SERPL-SCNC: 4.1 MMOL/L (ref 3.5–5)
RBC # BLD: 2.83 E12/L (ref 3.8–5.8)
SODIUM BLD-SCNC: 141 MMOL/L (ref 132–146)
TEAR DROP CELLS: ABNORMAL
TOTAL PROTEIN: 6.7 G/DL (ref 6.4–8.3)
WBC # BLD: 3.5 E9/L (ref 4.5–11.5)

## 2020-11-30 PROCEDURE — 80053 COMPREHEN METABOLIC PANEL: CPT

## 2020-11-30 PROCEDURE — 82378 CARCINOEMBRYONIC ANTIGEN: CPT

## 2020-11-30 PROCEDURE — 85025 COMPLETE CBC W/AUTO DIFF WBC: CPT

## 2020-11-30 PROCEDURE — 36415 COLL VENOUS BLD VENIPUNCTURE: CPT

## 2020-12-01 ENCOUNTER — TELEPHONE (OUTPATIENT)
Dept: INFUSION THERAPY | Age: 70
End: 2020-12-01

## 2020-12-01 ENCOUNTER — OFFICE VISIT (OUTPATIENT)
Dept: ONCOLOGY | Age: 70
End: 2020-12-01
Payer: MEDICARE

## 2020-12-01 ENCOUNTER — HOSPITAL ENCOUNTER (OUTPATIENT)
Dept: INFUSION THERAPY | Age: 70
Discharge: HOME | End: 2020-12-01
Payer: MEDICARE

## 2020-12-01 VITALS — HEART RATE: 64 BPM | SYSTOLIC BLOOD PRESSURE: 143 MMHG | DIASTOLIC BLOOD PRESSURE: 70 MMHG

## 2020-12-01 VITALS
WEIGHT: 201 LBS | DIASTOLIC BLOOD PRESSURE: 60 MMHG | TEMPERATURE: 97.7 F | HEART RATE: 75 BPM | BODY MASS INDEX: 26.64 KG/M2 | HEIGHT: 73 IN | SYSTOLIC BLOOD PRESSURE: 122 MMHG

## 2020-12-01 DIAGNOSIS — C20 RECTAL CANCER METASTASIZED TO LIVER (HCC): Primary | ICD-10-CM

## 2020-12-01 DIAGNOSIS — C78.7 RECTAL CANCER METASTASIZED TO LIVER (HCC): Primary | ICD-10-CM

## 2020-12-01 PROCEDURE — 2580000003 HC RX 258: Performed by: INTERNAL MEDICINE

## 2020-12-01 PROCEDURE — 96375 TX/PRO/DX INJ NEW DRUG ADDON: CPT

## 2020-12-01 PROCEDURE — 96411 CHEMO IV PUSH ADDL DRUG: CPT

## 2020-12-01 PROCEDURE — 96415 CHEMO IV INFUSION ADDL HR: CPT

## 2020-12-01 PROCEDURE — 96368 THER/DIAG CONCURRENT INF: CPT

## 2020-12-01 PROCEDURE — 99214 OFFICE O/P EST MOD 30 MIN: CPT | Performed by: INTERNAL MEDICINE

## 2020-12-01 PROCEDURE — 96413 CHEMO IV INFUSION 1 HR: CPT

## 2020-12-01 PROCEDURE — 6360000002 HC RX W HCPCS: Performed by: INTERNAL MEDICINE

## 2020-12-01 PROCEDURE — 96417 CHEMO IV INFUS EACH ADDL SEQ: CPT

## 2020-12-01 RX ORDER — DEXAMETHASONE SODIUM PHOSPHATE 10 MG/ML
10 INJECTION, SOLUTION INTRAMUSCULAR; INTRAVENOUS ONCE
Status: CANCELLED | OUTPATIENT
Start: 2020-12-01

## 2020-12-01 RX ORDER — PALONOSETRON HYDROCHLORIDE 0.05 MG/ML
0.25 INJECTION, SOLUTION INTRAVENOUS ONCE
Status: COMPLETED | OUTPATIENT
Start: 2020-12-01 | End: 2020-12-01

## 2020-12-01 RX ORDER — HEPARIN SODIUM (PORCINE) LOCK FLUSH IV SOLN 100 UNIT/ML 100 UNIT/ML
500 SOLUTION INTRAVENOUS PRN
Status: DISCONTINUED | OUTPATIENT
Start: 2020-12-01 | End: 2020-12-02 | Stop reason: HOSPADM

## 2020-12-01 RX ORDER — PALONOSETRON HYDROCHLORIDE 0.05 MG/ML
0.25 INJECTION, SOLUTION INTRAVENOUS ONCE
Status: CANCELLED | OUTPATIENT
Start: 2020-12-01

## 2020-12-01 RX ORDER — FLUOROURACIL 50 MG/ML
850 INJECTION, SOLUTION INTRAVENOUS ONCE
Status: CANCELLED | OUTPATIENT
Start: 2020-12-01

## 2020-12-01 RX ORDER — FLUOROURACIL 50 MG/ML
850 INJECTION, SOLUTION INTRAVENOUS ONCE
Status: COMPLETED | OUTPATIENT
Start: 2020-12-01 | End: 2020-12-01

## 2020-12-01 RX ORDER — ATROPINE SULFATE 0.4 MG/ML
0.4 AMPUL (ML) INJECTION ONCE
Status: CANCELLED | OUTPATIENT
Start: 2020-12-01

## 2020-12-01 RX ORDER — DIPHENHYDRAMINE HYDROCHLORIDE 50 MG/ML
50 INJECTION INTRAMUSCULAR; INTRAVENOUS ONCE
Status: CANCELLED | OUTPATIENT
Start: 2020-12-01

## 2020-12-01 RX ORDER — EPINEPHRINE 1 MG/ML
0.3 INJECTION, SOLUTION, CONCENTRATE INTRAVENOUS PRN
Status: CANCELLED | OUTPATIENT
Start: 2020-12-01

## 2020-12-01 RX ORDER — ATROPINE SULFATE 0.4 MG/ML
0.4 AMPUL (ML) INJECTION ONCE
Status: COMPLETED | OUTPATIENT
Start: 2020-12-01 | End: 2020-12-01

## 2020-12-01 RX ORDER — METHYLPREDNISOLONE SODIUM SUCCINATE 125 MG/2ML
125 INJECTION, POWDER, LYOPHILIZED, FOR SOLUTION INTRAMUSCULAR; INTRAVENOUS ONCE
Status: CANCELLED | OUTPATIENT
Start: 2020-12-01

## 2020-12-01 RX ORDER — SODIUM CHLORIDE 9 MG/ML
INJECTION, SOLUTION INTRAVENOUS CONTINUOUS
Status: CANCELLED | OUTPATIENT
Start: 2020-12-01

## 2020-12-01 RX ORDER — DEXAMETHASONE SODIUM PHOSPHATE 10 MG/ML
10 INJECTION INTRAMUSCULAR; INTRAVENOUS ONCE
Status: COMPLETED | OUTPATIENT
Start: 2020-12-01 | End: 2020-12-01

## 2020-12-01 RX ORDER — SODIUM CHLORIDE 0.9 % (FLUSH) 0.9 %
10 SYRINGE (ML) INJECTION PRN
Status: CANCELLED | OUTPATIENT
Start: 2020-12-01

## 2020-12-01 RX ORDER — SODIUM CHLORIDE 9 MG/ML
250 INJECTION, SOLUTION INTRAVENOUS CONTINUOUS
Status: DISCONTINUED | OUTPATIENT
Start: 2020-12-01 | End: 2020-12-02 | Stop reason: HOSPADM

## 2020-12-01 RX ORDER — HEPARIN SODIUM (PORCINE) LOCK FLUSH IV SOLN 100 UNIT/ML 100 UNIT/ML
500 SOLUTION INTRAVENOUS PRN
Status: CANCELLED | OUTPATIENT
Start: 2020-12-01

## 2020-12-01 RX ORDER — SODIUM CHLORIDE 9 MG/ML
250 INJECTION, SOLUTION INTRAVENOUS CONTINUOUS
Status: CANCELLED | OUTPATIENT
Start: 2020-12-01

## 2020-12-01 RX ORDER — SODIUM CHLORIDE 0.9 % (FLUSH) 0.9 %
10 SYRINGE (ML) INJECTION PRN
Status: DISCONTINUED | OUTPATIENT
Start: 2020-12-01 | End: 2020-12-02 | Stop reason: HOSPADM

## 2020-12-01 RX ORDER — 0.9 % SODIUM CHLORIDE 0.9 %
10 VIAL (ML) INJECTION ONCE
Status: CANCELLED | OUTPATIENT
Start: 2020-12-01

## 2020-12-01 RX ADMIN — ATROPINE SULFATE 0.4 MG: 0.4 INJECTION, SOLUTION INTRAMUSCULAR; INTRAVENOUS; SUBCUTANEOUS at 09:02

## 2020-12-01 RX ADMIN — FLUOROURACIL 850 MG: 50 INJECTION, SOLUTION INTRAVENOUS at 12:01

## 2020-12-01 RX ADMIN — BEVACIZUMAB 400 MG: 400 INJECTION, SOLUTION INTRAVENOUS at 09:31

## 2020-12-01 RX ADMIN — SODIUM CHLORIDE, PRESERVATIVE FREE 10 ML: 5 INJECTION INTRAVENOUS at 12:08

## 2020-12-01 RX ADMIN — DEXAMETHASONE SODIUM PHOSPHATE 10 MG: 10 INJECTION INTRAMUSCULAR; INTRAVENOUS at 09:04

## 2020-12-01 RX ADMIN — LEUCOVORIN CALCIUM 850 MG: 10 INJECTION INTRAMUSCULAR; INTRAVENOUS at 10:14

## 2020-12-01 RX ADMIN — PALONOSETRON 0.25 MG: 0.25 INJECTION, SOLUTION INTRAVENOUS at 09:00

## 2020-12-01 RX ADMIN — Medication 500 UNITS: at 12:08

## 2020-12-01 RX ADMIN — SODIUM CHLORIDE 250 ML: 9 INJECTION, SOLUTION INTRAVENOUS at 08:58

## 2020-12-01 RX ADMIN — SODIUM CHLORIDE 400 MG: 9 INJECTION, SOLUTION INTRAVENOUS at 10:15

## 2020-12-01 NOTE — TELEPHONE ENCOUNTER
FÁTIMA Martin 9, [phone], and left a message with Valorie, to up date the team that patient is being treated today and that his pump will need to ship to [facility] for his treatment. She voiced understanding.

## 2020-12-01 NOTE — TELEPHONE ENCOUNTER
Updated Dr. Sage Fernandez that patient's platelets were 88 and that patient's pump will need to be ordered if he plans on treating. He stated \"His cut off is 75 so ok to treat today and order his pump. \" Voiced understanding and called Agustín , [facility], [phone], and left a message for a return call to [phone] in order to send patient's pump to [facility] today for set up. Awaiting a return call.

## 2020-12-01 NOTE — PROGRESS NOTES
[place] [id number]  Attending Clinic Note     Reason for Visit: Follow-up on a patient with Metastatic Rectal Cancer.     PCP: Rashaun Avila MD     History of Present Illness:  68 y/o  male who was referred to see Dr. Ulisses Rousseau (GI team) for evaluation of bright red blood per rectum, mild anemia and change in bowel habits with diarrhea. CEA 2640 on 10/19/2015. AlcP 299 AST 57 ALT 75 on 10/19/2015. Colonoscopy in 2013 noted no significant polyps, colitis or lesions at that time. Denies any Family History of colorectal cancer or polyps.     Colonoscopy on 10/19/2015 revealed:  1. Ascending polyp, 8 mm, hot snare: Tubulovillous adenoma. 2. Transverse polyp, 1 cm, hot snare: Serrated polyp most consistent with sessile serrated adenoma. 3. Five splenic flexure polyps, three - 5 mm, 7 mm, 8 mm, hot snare and biopsy: Four segments of Tubular Adenoma  4. Descending polyp, 5 mm, biopsy: Tubular adenoma. 5. Sigmoid polyp, 4 mm biopsy, Serrated polyp most consistent with sessile serrated adenoma. 6. Two rectal polyps, 4 mm biopsy: Serrated polyp most consistent with hyperplastic polyp. 7. Rectosigmoid colon mass (large mass approximately 65% circumference of the lumen; Very friable, firm and hard): Tubulovillous adenoma with associated focal erosion and fibroplasia.      CT scan abdomen/pelvis on 10/26/2015:  1. Small nodules at lung bases likely represent metastatic colon   cancer. 2. Extensive likely metastatic colon cancer throughout the liver.      3. Mild mural thickening and luminal narrowing in the   terminal ileum, otherwise nonspecific.      Colonoscopy with snare removal rectal mass was performed by Dr. Harini Calix. Pathology proved:  Rectal polyp: Invasive adenocarcinoma involving villous adenoma and extending to the cauterized edge of excision. KRAS Mutation: Mutation detected.   BRAF Mutation: Mutation not detected. 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     CEA 3488. Bone scan on 11/10/2015 noted no metastatic disease. CT chest on 11/10/2015 revealed Multiple pulmonary nodules in the upper and lower lobes consistent with metastatic disease;   Hepatic metastasis also visualized. 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 Mediport was placed. Cycle # 1 of FOLFOX + Avastin was on 11/23/2015. CEA was 4555 on 11/23/2015. Cycle # 2 of FOLFOX + Avastin was on 12/08/2015. CEA was 3160 on 12/08/2015. Cycle # 3 of FOLFOX + Avastin was on 12/22/2015. CEA was 2516 on 12/21/2015. Cycle # 4 of FOLFOX + Avastin was on 01/05/2016. CEA was 2098 on 01/05/2015. Cycle # 5 of FOLFOX + Avastin was on 01/19/2016. CEA was 1511 on 01/05/2015.     -Bone scan on 01/26/2016 noted no metastatic disease. CT chest on 01/26/2016 revealed Significant response to treatment with no visible residual nodules. CT scan abdomen/pelvis on 01/26/2016 noted Interval decreased size of multiple masses in the liver compatible with treatment response. Continue another 2 months of FOLFOX + Avastin and repeat scans. Cycle # 6 of FOLFOX + Avastin was on 02/02/2016.  on 02/02/2016. Cycle # 7 of FOLFOX + Avastin was on 02/16/2016.  on 02/16/2016. Cycle # 8 of FOLFOX + Avastin was on 03/01/2016.  on 03/01/2016. Cycle # 9 of FOLFOX + Avastin was on 03/15/2016.  on 03/15/2016. Cycle # 10 of FOLFOX + Avastin was on 03/29/2016. .4 on 03/29/2016. Cycle # 11 of FOLFOX + Avastin was on 04/12/2016. .4 on 04/12/2016.     Re-staging scans on 04/19/2016: CT Chest: clear lungs; no evidence of recurring pulmonary nodule; CT Abdomen/Pelvis: Further interval decrease in size of the multiple metastatic hepatic lesions, the largest lesion now measures 3.9 x 3.5 cm and previously measured 4.5 cm in maximum diameter.  No mesenteric lymphadenopathy is identified; Bone Scan: No evidence of osseous metastasis. Continue same regimen and re-stage in 2-3 months. Cycle # 12 FOLFOX + Avastin was on 04/26/2016. .5 on 04/26/2016. Cycle # 13 FOLFOX + Avastin was on 05/10/2016. .3 on 05/10/2016. Cycle # 14 FOLFOX+AVASTIN was on 05/24/2016. .5 on 05/24/2016. Cycle # 15 FOLFOX + Avastin was on 06/07/2016. CEA 90.5 on 06/07/2016. Admitted to [facility] 06/13/2016-06/16/2016 for abdominal pain: EGD noted 1.5 cm clean based duodenal bulb ulceration s/p epinephrine and bicap per Dr. Mayra Mccarthy. No active bleeding. A. Stomach, biopsy: Mild chronic gastritis, immunostain negative for Helicobacter  B. Esophagus, biopsy: Gastric glandular mucosa with prominent intestinal metaplasia ([place]'s epithelium), negative for epithelial dysplasia, esophageal squamous mucosa not identified  Cycle # 16 FOLFOX (discontinued avastin (bevacizumab) given association of peptic ulcer disease and known association of GI perforation) was on 06/21/2016. Cycle # 17 FOLFOX was on 07/05/2016. CEA 52.6 on 07/19/2016. Cycle # 17 FOLFOX was on 07/05/2016. CEA 52.6 on 07/05/2016. CEA 47.6 on 07/19/2016.     Re-staging scans 07/19/2106: CT Chest negative for metastatic disease. CT Abdomen/Pelvis: Stable hepatic lesions; Question new mural thickening in the cecum. Bone Scan: New Let hip lesion suspicious for bone metastasis.     Increased CEA; new mural thickening in the cecum and new left hip lesion suspicious for bone metastasis are consistent with disease progression; He derived maximum benefit from FOLFOX/AVASTIN. D/C FOLFOX/AVASTIN. We recommended FOLFIRI second line therapy. Cycle # 1 FOLFIRI was on 08/02/2016. CEA 40.8 on 08/02/2016. Xgeva q4 weeks started on 08/02/2016. Cycle # 2 FOLFIRI was on 08/16/2016. CEA 31.7 on 08/16/2016. Cycle # 3 FOLFIRI (added Avastin) was on 08/30/2016 given that ulcers healed on EGD 08/15/2016 by Dr. Yung Palacios; Protonix bid since avastin re-started.    Colonoscopy on 08/29/2016 (to look at the cecal area) unremarkable. CEA 26.7 on 08/30/2016. Cycle # 4 FOLFIRI/Avastin was on 09/13/2016. CEA 23.4 on 09/13/2016. Cycle # 5 FOLFIRI/Avastin was on 09/27/2016. CEA 22.3 on 09/27/2016. Cycle # 6 FOLFIRI/Avastin was on 10/11/2016. CEA 18.4 on 10/11/2016.      Bone scan 10/21/2016 stable bone metastasis; ? New lesion anterior left sixth rib likely interval healing fracture. CT abdomen/pelvis revealed stable hepatic metastasis. CT chest negative for metastatic disease. Continue FOLFIRI/Avastin and repeat scans in 3 months. Cycle # 7 FOLFIRI/Avastin was on 10/25/2016. CEA 16.1  Cycle # 8 FOLFIRI/Avastin was on 11/08/2016. CEA 15.7  Cycle # 9 FOLFIRI/Avastin was on 11/29/2016. CEA 13.6 on 11/29/2016. Cycle # 10 FOLFIRI/Avastin was on 12/13/2016. CEA 10.6 on 12/13/2016. Cycle # 11 FOLFIRI/Avastin was on 12/27/2016. CEA 11.1 on 12/27/2016. Cycle # 12 FOLFIRI/Avastin was on 01/10/2017. CEA 9.7 on 01/10/2017.       CT chest 01/23/2017 No new pulmonary nodule or lymphadenopathy. Patchy groundglass opacities within the left lower lobe, suggestive of infectious or inflammatory etiology. Followup CT chest in 3 months. Slight increased linear sclerosis along the left anterior sixth rib corresponding to an area of increased uptake on the prior bone scan from 10/21/2016, favored to be related to interval healing changes of a nondisplaced fracture. CT abdomen/pelvis on 01/23/2017 Redemonstration of multiple hepatic metastases, which are similar compared to the prior CT from 10/21/2016. Redemonstration of area of increased sclerosis along the right acetabulum suspicious for metastatic disease. Bone scan on 01/23/2017 Stable subtle uptake within the left proximal diaphysis, again suggestive of metastatic disease  Decreased subtle uptake within the medial right acetabulum.    Decreased uptake within the left anterior sixth rib corresponding to linear sclerosis on the recent CT, favored to be related to an joint rib fracture. Continue FOLFIRI + Avastin and repeat scans in 3 months. Cycle # 13 FOLFIRI + Avastin was on 01/24/2017. Cycle # 14 FOLFIRI + Avastin was on 02/07/2017. Cycle # 15 FOLFIRI + Avastin was on 02/21/2017. Cycle # 16 FOLFIRI + Avastin was on 03/07/2017. Cycle # 17 FOLFIRI + Avastin was on 03/21/2017. Cycle # 18 FOLFIRI + Avastin was on 04/04/2017. CT chest 04/17/2017 negative for metastatic disease. CT abdomen/pelvis 04/17/2017 Stable hypodense metastatic lesions within the liver. Stable area of increased sclerosis along the right acetabulum  Bone scan 04/17/2017 Stable subtle uptake within the left proximal femoral diaphysis; No definite abnormal uptake in the region of a sclerotic lesion along the posterior column of the right acetabulum noted on CT. Stable slight uptake within the left anterior sixth rib corresponding to linear sclerosis on the recent CT, favored to be related to a fracture. Continue FOLFIRI + Avastin and repeat scans in 3 months. Cycle # 19 FOLFIRI + Avastin was on 04/18/2017. Cycle # 20 FOLFIRI + Avastin was on 05/02/2017. Cycle # 21 FOLFIRI + Avastin was on 05/16/2017. Cycle # 22 FOLFIRI + Avastin was on 05/30/2017. Cycle # 23 FOLFIRI + Avastin was on 06/13/2017. Cycle # 24 FOLFIRI + Avastin was on 06/27/2017. Cycle # 25 FOLFIRI + Avastin was on 07/11/2017. Cycle # 26 FOLFIRI + Avastin was on 07/25/2017. Cycle # 27 FOLFIRI + Avastin was on 08/08/2017. Cycle # 28 FOLFIRI + Avastin was on 08/22/2017. Cycle # 29 FOLFIRI + Avastin was on 09/05/2017. Cycle # 30 FOLFIRI + Avastin was on 09/19/2017. Bone scan 09/26/2017 stable. CT abdomen/pelvis on 09/26/2017 Stable hypodense mass in the liver. Stable sclerotic lesion in the acetabulum. CT chest 09/26/2017 negative for metastatic disease. Cycle # 31 FOLFIRI + Avastin was on 10/03/2017. CEA 7.4 on 10/03/2017. Cycle # 32 FOLFIRI + Avastin was on 10/17/2017.  CEA 6.0 on 10/17/2017. Cycle # 33 FOLFIRI + Avastin was on 10/31/2017. CEA 6.8 on 10/31/2017. Cycle # 34 FOLFIRI + Avastin was on 11/14/2017. CEA 7.2 on 11/14/2017. Cycle # 35 FOLFIRI + Avastin was on 11/28/2017. CEA 5.1 on 11/28/2017. Cycle # 36 FOLFIRI + Avastin was on 12/12/2017. CEA 6.1 on 12/12/2017. Bone scan 12/22/2017 noted no evidence of metastatic disease to the axial or appendicular skeleton. CT chest 12/22/2017 noted no evidence of metastatic disease. CT abdomen/pelvis 12/22/2017 noted stable hypodense lesions in the liver. Continue FOLFIRI + Avastin and repeat scans in 3 months. Cycle # 37 FOLFIRI + Avastin was on 12/27/2018. Cycle # 38 FOLFIRI + Avastin was on 01/09/2018. Cycle # 39 FOLFIRI + Avastin was on 01/23/2018. Cycle # 40 FOLFIRI + Avastin was on 02/06/2018. Cycle # 41 FOLFIRI + Avastin was on 02/20/2018. Cycle # 42 FOLFIRI + Avastin was on 03/06/2018. Cycle # 43 FOLFIRI + Avastin was on 03/20/2018. Bone scan on 03/26/2018 negative for metastatic disease. CT chest 03/26/2018 noted ? new 7 mm nodule in LUCY. CT abdomen/pelvis 03/26/2018 noted stable hypodense lesions in the liver. ? New small ascites in the abdomen. Patient wants to continue to monitor the lung finding and repeat scans in 2 months instead of changing the current regimen into oral Lonsurf. Cycle # 44 FOLFIRI + Avastin was on 04/03/2018. CEA 6.3 on 04/03/2018. Cycle # 45 FOLFIRI + Avastin was on 04/24/2018. CEA 5.3 on 04/24/2018. Cycle # 46 FOLFIRI + Avastin was on 05/08/2018. CEA 5.4 on 05/08/2018. Cycle # 47 FOLFIRI + Avastin was on 05/22/2018. CEA 6.1 on 05/22/2018. CT chest 05/31/2018 noted stable nodule in LUCY. No evidence of worsening malignancy. CT abdomen/pelvis on 05/31/2018 noted stable liver metastasis. No evidence of worsening malignancy. Continue FOLFIRI + Avastin and repeat scans in 3 months. Cycle # 48 FOLFIRI + Avastin was on 06/06/2018. Cycle # 49 FOLFIRI + Avastin was on 06/19/2018. Cycle # 50 FOLFIRI + Avastin was on 07/03/2018. Cycle # 51 FOLFIRI + Avastin was on 07/24/2018. Cycle # 52 FOLFIRI + Avastin was on 08/14/2018. Cycle # 53 FOLFIRI + Avastin was on 08/28/2018. CT chest 09/06/2018 noted interval decrease in size of LUCY measuring up to 4 mm, suggestive of treatment response. No mediastinal or hilar LN  CT abdomen/pelvis 09/06/2018 Slight interval increase in size of a previously noted metastatic lesion within the right hepatic lobe. This may be related to differences in phase of enhancement compared to the most recent study from 5/31/2018, the lesion remains decreased in size compared to the more previous studies. No abdominal, retroperitoneal, or pelvic lymphadenopathy. Continue FOLFIRI + Avastin and repeat scans in 2 months. Cycle # 54 FOLFIRI + Avastin was on 09/11/2018. Cycle # 55 FOLFIRI + Avastin was on 09/25/2018. Cycle # 56 FOLFIRI + Avastin was on 10/09/2018. Cycle # 57 FOLFIRI + Avastin was on 10/23/2018. CT chest 11/02/2018 stable 3 mm nodule within LUCY. No new right and left lung nodule. No mediastinal or osseous lesion. CT abdomen/pelvis 11/02/2018 stable metastatic disease to liver. No new metastatic disease identified. No mesenteric or retroperitoneal LN. No gross colonic lesion identified. Continue FOLFIRI + Avastin and repeat scans in 3 months. Cycle # 58 FOLFIRI + Avastin was on 11/06/2018. CEA 7.6 on 11/05/2018. Cycle # 59 FOLFIRI + Avastin was on 11/27/2018. CEA 6.9 on 11/26/2018. Cycle # 60 FOLFIRI + Avastin was on 12/11/2018. CEA 6.7 on 12/11/2018. Cycle # 61 FOLFIRI + Avastin was on 01/08/2019. CEA 5.6 on 01/07/2019. Cycle # 62 FOLFIRI + Avastin was on 01/29/2019. CEA 4.6 on 01/28/2019. Cycle # 63 FOLFIRI + Avastin was on 02/12/2019. CEA 4.3 on 02/11/2019. CT chest 02/21/2019 no evidence of metastatic disease. CT abdomen/pelvis 02/21/2019 stable hypodense liver lesions. No evidence of worsening metastatic disease.  Large right decreased in size compared to the CT from 3/26/2018. No thoracic LN. CT abdomen/pelvis 2019 Previously described small hypodense lesions are more conspicuous on the current study compared to the recent study throughout the liver from 2019, however similar to the prior study from 2019. Findings may be related to differences in contrast opacification. No abdominal or pelvic lymphadenopathy. Continue FOLFIRI + Avastin and repeat scans in 2 months to f/u on liver lesions. Cycle # 81 FOLFIRI + Avastin was on 2020. CEA 3.8 on 2020. Cycle # 82 FOLFIRI + Avastin was on 2020. CEA 3.7 on 2020. Cycle # 83 FOLFIRI + Avastin was on 2020. CEA 4.1 on 2020. Cycle # 84 FOLFIRI + Avastin was on 2020. CEA 4.6 on 2020. EGD by Dr. Yetta Apgar 2020 showing no masses or lesions  Cycle # 85 FOLFIRI + Avastin was on 2020. CEA 7.4 on 2020. Cycle # 86 FOLFIRI + Avastin was on 2020. CEA 4.5 on 2020. Colonoscopy on 2020 by Dr. Yetta Apgar unremarkable (records requested). Cycle # 87 FOLFIRI + Avastin was on 2020. CEA 4.1 on 2020. Cycle # 88 FOLFIRI + Avastin was on 2020. CEA 6.4 on 2020. Cycle # 89 FOLFIRI + Avastin was on 2020. CEA 5.8 on 2020. Cycle # 90 FOLFIRI + Avastin was on 2020. CEA 5.5 on 2020. Cycle # 91 FOLFIRI + Avastin was on 2020. CEA 5.6 on 06/15/2020. CT chest 2020 noted no metastatic disease in the chest.   CT abdomen/pelvis 2020 Stable small liver lesions measuring up to 5 mm since 2019.   22 mm round mass in segment 8 of the liver with central high density stable from 2019), previously measuring up to 34 mm in 2017. No additional metastatic disease in the abdomen or pelvis. Small amount of ascites. Overall stable disease. Continue FOLFIRI + Avastin and repeat scans in 2-3 months. Cycle # 92 FOLFIRI + Avastin was on 2020.  CEA [Neomycin-Polymyxin-Gramicidin] Rash    Tape Francesca Alvarado Tape] Rash      Physical Exam:  /60 (Site: Left Upper Arm, Position: Sitting, Cuff Size: Medium Adult)   Pulse 75   Temp 97.7 °F (36.5 °C) (Temporal)   Ht 6' 1\" (1.854 m)   Wt 201 lb (91.2 kg)   BMI 26.52 kg/m²   GENERAL: Alert, oriented x 3, not in acute distress  HEENT: PERRLA, EOMI. No oral lesions. NECK: Supple. Without lymphadenopathy. LUNGS: Lungs are CTA bilaterally, with no wheezing, crackles or rhonchi. CARDIOVASCULAR: Regular rhythm. No murmurs, rubs or gallops. ABDOMEN: Soft. Non-tender, non-distended. Positive bowel sounds. EXTREMITIES: Without clubbing, cyanosis or edema. NEUROLOGIC: No focal deficits. ECOG PS 1    Diagnostics:  Lab Results   Component Value Date    WBC 3.5 (L) 11/30/2020    HGB 8.9 (L) 11/30/2020    HCT 27.3 (L) 11/30/2020    MCV 96.5 11/30/2020    PLT 88 (L) 11/30/2020     Lab Results   Component Value Date     11/30/2020    K 4.1 11/30/2020     11/30/2020    CO2 25 11/30/2020    BUN 20 11/30/2020    CREATININE 1.2 11/30/2020    GLUCOSE 158 (H) 11/30/2020    CALCIUM 9.7 11/30/2020    PROT 6.7 11/30/2020    LABALBU 3.6 11/30/2020    BILITOT 0.6 11/30/2020    ALKPHOS 170 (H) 11/30/2020    AST 25 11/30/2020    ALT 17 11/30/2020    LABGLOM 60 11/30/2020    GFRAA >60 11/30/2020     Lab Results   Component Value Date    CEA 3.5 11/30/2020      Impression/Plan:  68 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 KRAS Mutation: Mutation detected. BRAF Mutation: Mutation not detected. 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 CT scan abdomen/pelvis on 10/26/2015 revealed small nodules at the lung bases, extensive liver lesions consistent with metastatic rectosigmoid cancer. CEA 3488 on 10/30/2015. Bone scan on 11/10/2015 noted no metastatic disease.  CT chest on 11/10/2015 revealed Multiple pulmonary nodules in the upper and lower lobes consistent with metastatic disease; Hepatic metastasis also visualized. 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 Mediport was placed. Cycle # 1 of FOLFOX + Avastin was on 11/23/2015. CEA was 4555 on 11/23/2015. Cycle # 2 of FOLFOX + Avastin was on 12/08/2015. CEA was 3160 on 12/08/2015. Cycle # 3 of FOLFOX + Avastin was on 12/22/2015. CEA was 2516 on 12/21/2015. Cycle # 4 of FOLFOX + Avastin was on 01/05/2016. CEA was 2098 on 01/05/2015. Cycle # 5 of FOLFOX + Avastin was on 01/19/2016. CEA was 1511 on 01/05/2015.     -Bone scan on 01/26/2016 noted no metastatic disease. CT chest on 01/26/2016 revealed Significant response to treatment with no visible residual nodules. CT scan abdomen/pelvis on 01/26/2016 noted Interval decreased size of multiple masses in the liver compatible with treatment response. Continue another 2 months of FOLFOX + Avastin and repeat scans. Cycle # 6 of FOLFOX + Avastin was on 02/02/2016.  on 02/02/2016. Cycle # 7 of FOLFOX + Avastin was on 02/16/2016.  on 02/16/2016. Cycle # 8 of FOLFOX + Avastin was on 03/01/2016.  on 03/01/2016. Cycle # 9 of FOLFOX + Avastin was on 03/15/2016.  on 03/15/2016. Cycle # 10 of FOLFOX + Avastin was on 03/29/2016. .4 on 03/29/2016. Cycle # 11 of FOLFOX + Avastin was on 04/12/2016. .4 on 04/12/2016.     Re-staging scans on 04/19/2016: CT Chest: clear lungs; no evidence of recurring pulmonary nodule; CT Abdomen/Pelvis: Further interval decrease in size of the multiple metastatic hepatic lesions, the largest lesion now measures 3.9 x 3.5 cm and previously measured 4.5 cm in maximum diameter. No mesenteric lymphadenopathy is identified; Bone Scan: No evidence of osseous metastasis. Continue same regimen and re-stage in 2-3 months. Cycle # 12 FOLFOX + Avastin was on 04/26/2016. .5 on 04/26/2016. Cycle # 13 FOLFOX + Avastin was on 05/10/2016. .3 on 05/10/2016.  Cycle # 14 FOLFOX+AVASTIN was on 05/24/2016. .5 on 05/24/2016. Cycle # 15 FOLFOX + Avastin was on 06/07/2016. CEA 90.5 on 06/07/2016. Admitted to [facility] 06/13/2016-06/16/2016 for abdominal pain: EGD noted 1.5 cm clean based duodenal bulb ulceration s/p epinephrine and bicap per Dr. Ravinder Vaughan. No active bleeding. A. Stomach, biopsy: Mild chronic gastritis, immunostain negative for Helicobacter  B. Esophagus, biopsy: Gastric glandular mucosa with prominent ntestinal metaplasia ([place]'s epithelium), negative for epithelial dysplasia, esophageal squamous mucosa not identified     Cycle # 16 FOLFOX (discontinued avastin (bevacizumab) given association of peptic ulcer disease and known association of GI perforation.) was on 06/21/2016. CEA 47 on 06/21/2016. Cycle # 17 FOLFOX was on 07/05/2016. CEA 52.6 on 07/05/2016. CEA 47.6 on 07/19/2016.     Re-staging scans 07/19/2106: CT Chest negative for metastatic disease. CT Abdomen/Pelvis: Stable hepatic lesions; Question new mural thickening in the cecum. Bone Scan: New Let hip lesion suspicious for bone metastasis.     Increased CEA; new mural thickening in the cecum and new left hip lesion suspicious for bone metastasis are consistent with disease progression; He derived maximum benefit from FOLFOX/AVASTIN. D/C FOLFOX/AVASTIN. We recommended FOLFIRI second line therapy. Cycle # 1 FOLFIRI was on 08/02/2016. CEA 40.8 on 08/02/2016. Xgeva q4 weeks started on 08/02/2016. Cycle # 2 FOLFIRI was on 08/16/2016. CEA 31.7 on 08/16/2016. Cycle # 3 FOLFIRI (added Avastin) was on 08/30/2016 given that ulcers healed on EGD 08/15/2016 by Dr. Cody Gonzalez; Protonix bid since avastin re-started. Colonoscopy on 08/29/2016 (to look at the cecal area) unremarkable. CEA 26.7 on 08/30/2016. Cycle # 4 FOLFIRI/Avastin was on 09/13/2016. CEA 23.4 on 09/13/2016. Cycle # 5 FOLFIRI/Avastin was on 09/27/2016. CEA 22.3 on 09/27/2016. Cycle # 6 FOLFIRI/Avastin was on 10/11/2016.  CEA 18.4 on 02/21/2017. Cycle # 16 FOLFIRI + Avastin was on 03/07/2017. Cycle # 17 FOLFIRI + Avastin was on 03/21/2017. Cycle # 18 FOLFIRI + Avastin was on 04/04/2017. CT chest 04/17/2017 negative for metastatic disease. CT abdomen/pelvis 04/17/2017 Stable hypodense metastatic lesions within the liver. Stable area of increased sclerosis along the right acetabulum  Bone scan 04/17/2017 Stable subtle uptake within the left proximal femoral diaphysis; No definite abnormal uptake in the region of a sclerotic lesion along the posterior column of the right acetabulum noted on CT. Stable slight uptake within the left anterior sixth rib corresponding to linear sclerosis on the recent CT, favored to be related to a fracture. Continue FOLFIRI + Avastin and repeat scans in 3 months. Cycle # 19 FOLFIRI + Avastin was on 04/18/2017. CEA 7.5 on 04/18/2017. Cycle # 20 FOLFIRI + Avastin was on 05/02/2017. CEA 7.7 on 05/02/2017. Cycle # 21 FOLFIRI + Avastin was on 05/16/2017. CEA 7.1 on 05/16/2017. Cycle # 22 FOLFIRI + Avastin was on 05/30/2017. CEA 8.2 on 05/30/2017. Cycle # 23 FOLFIRI + Avastin was on 06/13/2017. CEA 7.7 on 06/13/2017. Cycle # 24 FOLFIRI + Avastin was on 06/27/2017. CEA 6.6 on 06/27/2017. Re-staging scans 07/05/2017:  Bone scan stable proximal left femoral diaphysis, right acetabulum, and left anterior sixth rib lesions;  CT abdomen/pelvis stable hypodense metastatic lesions within the liver; CT chest without convincing evidence of metastatic disease. Cycle # 25 FOLFIRI + Avastin was on 07/11/2017. CEA 6.3 on 07/11/2017. Cycle # 26 FOLFIRI + Avastin was on 07/25/2017. CEA 7.3 on 07/25/2017. Cycle # 27 FOLFIRI + Avastin was on 08/08/2017. CEA 6.5 on 08/08/2017. Cycle # 28 FOLFIRI + Avastin was on 08/22/2017. CEA 5.9 on 08/22/2017. Cycle # 29 FOLFIRI + Avastin was on 09/05/2017. CEA 6.3 on 09/05/2017. Cycle # 30 FOLFIRI + Avastin was on 09/19/2017. CEA 6.3 on 09/19/2017.   Bone scan 09/26/2017 stable. CT abdomen/pelvis on 09/26/2017 Stable hypodense mass in the liver. Stable sclerotic lesion in the acetabulum. CT chest 09/26/2017 negative for metastatic disease. Cycle # 31 FOLFIRI + Avastin was on 10/03/2017. CEA 7.4 on 10/03/2017. Cycle # 32 FOLFIRI + Avastin was on 10/17/2017. CEA 6.0 on 10/17/2017. Cycle # 33 FOLFIRI + Avastin was on 10/31/2017. CEA 6.8 on 10/31/2017. Cycle # 34 FOLFIRI + Avastin was on 11/14/2017. CEA 7.2 on 11/14/2017. Cycle # 35 FOLFIRI + Avastin was on 11/28/2017. CEA 5.1 on 11/28/2017. Cycle # 36 FOLFIRI + Avastin was on 12/12/2017. CEA 6.1 on 12/12/2017. Bone scan 12/22/2017 noted no evidence of metastatic disease to the axial or appendicular skeleton. CT chest 12/22/2017 noted no evidence of metastatic disease. CT abdomen/pelvis 12/22/2017 noted stable hypodense lesions in the liver. Continue FOLFIRI + Avastin and repeat scans in 3 months. Cycle # 37 FOLFIRI + Avastin was on 12/27/2018. CEA 6.7 on 12/27/2017. Cycle # 38 FOLFIRI + Avastin was on 01/09/2018. CEA 5.0 on 01/09/2018. Cycle # 39 FOLFIRI + Avastin was on 01/23/2018. CEA 6.5 on 01/23/2018. Cycle # 40 FOLFIRI + Avastin was on 02/06/2018. CEA 6.9 on 02/06/2018. Cycle # 41 FOLFIRI + Avastin was on 02/20/2018. CEA 6.4 on 02/20/2018. Cycle # 42 FOLFIRI + Avastin was on 03/06/2018. CEA 6.6 on 03/06/2018. Cycle # 43 FOLFIRI + Avastin was on 03/20/2018. CEA 5.7 on 03/20/2018. Bone scan on 03/26/2018 negative for metastatic disease. CT chest 03/26/2018 noted ? new 7 mm nodule in LUCY. CT abdomen/pelvis 03/26/2018 noted stable hypodense lesions in the liver. ? New small ascites in the abdomen. Patient wants to continue to monitor the lung finding and repeat scans in 2 months instead of changing the current chemotherapy regimen to oral Lonsurf. Cycle # 44 FOLFIRI + Avastin was on 04/03/2018. CEA 6.3 on 04/03/2018. Cycle # 45 FOLFIRI + Avastin was on 04/24/2018. CEA 5.3 on 04/24/2018.    Cycle # 46 FOLFIRI + Avastin was on 05/08/2018. CEA 5.4 on 05/08/2018. Cycle # 47 FOLFIRI + Avastin was on 05/22/2018. CEA 6.1 on 05/22/2018. CT chest 05/31/2018 noted stable nodule in LUCY. No evidence of worsening malignancy. CT abdomen/pelvis on 05/31/2018 noted stable liver metastasis. No evidence of worsening malignancy. Continue FOLFIRI + Avastin and repeat scans in 3 months. Cycle # 48 FOLFIRI + Avastin was on 06/06/2018. CEA 6.3 on 06/05/2018. Cycle # 49 FOLFIRI + Avastin was on 06/19/2018. CEA 6.1 on 06/19/2018. Cycle # 50 FOLFIRI + Avastin was on 07/03/2018. CEA 7.4 on 07/03/2018. Cycle # 51 FOLFIRI + Avastin was on 07/24/2018. CEA 6.7 on 07/24/2018. Cycle # 52 FOLFIRI + Avastin was on 08/14/2018. CEA 6.8 on 08/14/2018. Cycle # 53 FOLFIRI + Avastin was on 08/28/2018. CEA 6.5 on 08/27/2018. CT chest 09/06/2018 noted interval decrease in size of LUCY measuring up to 4 mm, suggestive of treatment response. No mediastinal or hilar LN  CT abdomen/pelvis 09/06/2018 Slight interval increase in size of a previously noted metastatic lesion within the right hepatic lobe. This may be related to differences in phase of enhancement compared to the most recent study from 5/31/2018, the lesion remains decreased in size compared to the more previous studies. No abdominal, retroperitoneal, or pelvic lymphadenopathy. Continue FOLFIRI + Avastin and repeat scans in 2 months. Cycle # 54 FOLFIRI + Avastin was on 09/11/2018. CEA 6.4 on 09/10/2018. Cycle # 55 FOLFIRI + Avastin was on 09/25/2018. CEA 6.4 on 09/25/2018. Cycle # 56 FOLFIRI + Avastin was on 10/09/2018. CEA 6.7 on 10/08/2018. Cycle # 57 FOLFIRI + Avastin was on 10/23/2018. CEA 7.2 on 10/22/2018. CT chest 11/02/2018 stable 3 mm nodule within LUCY. No new right and left lung nodule. No mediastinal or osseous lesion. CT abdomen/pelvis 11/02/2018 stable metastatic disease to liver. No new metastatic disease identified.  No mesenteric or retroperitoneal LN.   No gross colonic lesion identified. Continue FOLFIRI + Avastin and repeat scans in 3 months. Cycle # 58 FOLFIRI + Avastin was on 11/06/2018. CEA 7.6 on 11/05/2018. Cycle # 59 FOLFIRI + Avastin was on 11/27/2018. CEA 6.9 on 11/26/2018. Cycle # 60 FOLFIRI + Avastin was on 12/11/2018. CEA 6.7 on 12/11/2018. Cycle # 61 FOLFIRI + Avastin was on 01/08/2019. CEA 5.6 on 01/07/2019. Cycle # 62 FOLFIRI + Avastin was on 01/29/2019. CEA 4.6 on 01/28/2019. Cycle # 63 FOLFIRI + Avastin was on 02/12/2019. CEA 4.3 on 02/11/2019. CT chest 02/21/2019 no evidence of metastatic disease. CT abdomen/pelvis 02/21/2019 stable hypodense liver lesions. No evidence of worsening metastatic disease. 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 Ordered MRI thoracic spine and referred to neurosurgery team.  Cycle # 64 FOLFIRI + Avastin was on 02/26/2019. CEA 4.7 on 02/25/2019. Cycle # 65 FOLFIRI + Avastin was on 03/12/2019. CEA 4.0 on 03/11/2019. Cycle # 66 FOLFIRI + Avastin was on 03/26/2019. CEA 4.7 on 03/25/2019. Cycle # 67 FOLFIRI + Avastin was on 04/09/2019. CEA 5.6 on 04/08/2019. Cycle # 68 FOLFIRI + Avastin was on 04/30/2019. CEA 4.9 on 04/29/2019. Cycle # 69 FOLFIRI + Avastin was on 05/14/2019. CEA 5.3 on 05/14/2019. CT chest 05/23/2019 stable small lung nodule with no evidence of metastatic disease. CT abdomen/pelvis 05/23/2019 Decrease conspicuity of the liver lesions. No new lesions seen. Stable splenomegaly. Continue FOLFIRI + Avastin and repeat scans in 3 months. Cycle # 70 FOLFIRI + Avastin was on 06/04/2019. CEA 4.8 on 06/03/2019. Cycle # 71 FOLFIRI + Avastin was on 06/18/2019. CEA 4.6 on 06/17/2019. Cycle # 72 FOLFIRI + Avastin was on 07/09/2019. CEA 4.3 on 07/08/2019. Cycle # 73 FOLFIRI + Avastin was on 07/30/2019. CEA 5.0 on 07/29/2019. Cycle # 74 FOLFIRI + Avastin was on 08/13/2019. CEA 5.0 on 08/12/2019.   CT chest 08/20/2019 negative  CT abdomen/pelvis 08/20/2019 Previous identified hepatic lesions are not visualized on the current exam.  Continue FOLFIRI + Avastin and repeat scans in 3 months. Cycle # 75 FOLFIRI + Avastin was on 08/27/2019. CEA 5.0 on 08/26/2019. Cycle # 76 FOLFIRI + Avastin was on 09/17/2019. CEA 5.5 on 09/16/2019. Cycle # 77 FOLFIRI + Avastin was on 10/15/2019. CEA 4.6 on 10/15/2019. Cycle # 78 FOLFIRI + Avastin was on 10/29/2019. CEA 4.1 on 10/28/2019. Cycle # 79 FOLFIRI + Avastin was on 11/12/2019. CEA 4.3 on 11/12/2019. Cycle # 80 FOLFIRI + Avastin was on 12/10/2019. CEA 4.0 on 12/09/2019. CT chest 12/19/2019 Stable 3 mm nodule within the left upper lobe, similar to the previous studies dating back to 11/2/2018, however decreased in size compared to the CT from 3/26/2018. No thoracic LN. CT abdomen/pelvis 12/19/2019 Previously described small hypodense lesions are more conspicuous on the current study compared to the recent study throughout the liver from 8/20/2019, however similar to the prior study from 2/21/2019. Findings may be related to differences in contrast opacification. No abdominal or pelvic lymphadenopathy. Continue FOLFIRI + Avastin and repeat scans in 2 months to f/u on liver lesions. Cycle # 81 FOLFIRI + Avastin was on 01/07/2020. CEA 3.8 on 01/06/2020. Cycle # 82 FOLFIRI + Avastin was on 01/21/2020. CEA 3.7 on 01/20/2020. Cycle # 83 FOLFIRI + Avastin was on 02/04/2020. CEA 4.1 on 02/03/2020. Cycle # 84 FOLFIRI + Avastin was on 02/18/2020. CEA 4.6 on 02/17/2020. CT chest 2/28/2020 no evidence of metastatic disease to the lungs and no mediastinal or hilar adenopathy. CT abdomen pelvis 2/28/2020 no enhancing lesions seen within the liver to suggest metastatic disease. Wall thickening of the rectosigmoid junction. Images reviewed. Continue FOLFIRI Avastin and repeat scans in 3 months  EGD by Dr. Chris Love 03/02/2020 showing no masses or lesions. Cycle # 85 FOLFIRI + Avastin was on 03/03/2020.  CEA 7.4 on re-start chemotherapy. Cycle # 96 FOLFIRI + Avastin was on 11/17/2020. CEA 3.6 on 11/16/2020. Cycle # 97 FOLFIRI + Avastin is today 12/01/2020. CEA 3.5 on 11/30/2020. Labs reviewed, ok to proceed.     RTC 2 weeks for Cycle # 98 FOLFIRI + Avastin    MSI testing noted no mismatch repair protein loss of expression    12/1/2020  Maria Del Carmen Mallory MD  Board Certified Medical Oncologist

## 2020-12-03 ENCOUNTER — HOSPITAL ENCOUNTER (OUTPATIENT)
Dept: INFUSION THERAPY | Age: 70
Discharge: HOME | End: 2020-12-03
Payer: MEDICARE

## 2020-12-03 DIAGNOSIS — C20 RECTAL CANCER METASTASIZED TO LIVER (HCC): ICD-10-CM

## 2020-12-03 DIAGNOSIS — C78.7 RECTAL CANCER METASTASIZED TO LIVER (HCC): ICD-10-CM

## 2020-12-03 DIAGNOSIS — Z51.11 ENCOUNTER FOR ANTINEOPLASTIC CHEMOTHERAPY: Primary | ICD-10-CM

## 2020-12-03 PROCEDURE — 96372 THER/PROPH/DIAG INJ SC/IM: CPT

## 2020-12-03 PROCEDURE — 6360000002 HC RX W HCPCS: Performed by: INTERNAL MEDICINE

## 2020-12-03 RX ADMIN — PEGFILGRASTIM-CBQV 6 MG: 6 INJECTION, SOLUTION SUBCUTANEOUS at 14:12

## 2020-12-14 ENCOUNTER — HOSPITAL ENCOUNTER (OUTPATIENT)
Age: 70
Discharge: HOME | End: 2020-12-14
Payer: MEDICARE

## 2020-12-14 LAB
ALBUMIN SERPL-MCNC: 3.7 G/DL (ref 3.5–5.2)
ALP BLD-CCNC: 170 U/L (ref 40–129)
ALT SERPL-CCNC: 17 U/L (ref 0–40)
ANION GAP SERPL CALCULATED.3IONS-SCNC: 11 MMOL/L (ref 7–16)
ANISOCYTOSIS: ABNORMAL
AST SERPL-CCNC: 25 U/L (ref 0–39)
BASOPHILS ABSOLUTE: 0 E9/L (ref 0–0.2)
BASOPHILS RELATIVE PERCENT: 0 % (ref 0–2)
BILIRUB SERPL-MCNC: 0.7 MG/DL (ref 0–1.2)
BUN BLDV-MCNC: 16 MG/DL (ref 8–23)
CALCIUM SERPL-MCNC: 9 MG/DL (ref 8.6–10.2)
CHLORIDE BLD-SCNC: 103 MMOL/L (ref 98–107)
CO2: 24 MMOL/L (ref 22–29)
CREAT SERPL-MCNC: 1.2 MG/DL (ref 0.7–1.2)
EOSINOPHILS ABSOLUTE: 0.04 E9/L (ref 0.05–0.5)
EOSINOPHILS RELATIVE PERCENT: 2 % (ref 0–6)
GFR AFRICAN AMERICAN: >60
GFR NON-AFRICAN AMERICAN: 60 ML/MIN/1.73
GLUCOSE BLD-MCNC: 117 MG/DL (ref 74–99)
HCT VFR BLD CALC: 26.9 % (ref 37–54)
HEMOGLOBIN: 8.7 G/DL (ref 12.5–16.5)
LYMPHOCYTES ABSOLUTE: 0.62 E9/L (ref 1.5–4)
LYMPHOCYTES RELATIVE PERCENT: 28 % (ref 20–42)
MCH RBC QN AUTO: 30.4 PG (ref 26–35)
MCHC RBC AUTO-ENTMCNC: 32.3 % (ref 32–34.5)
MCV RBC AUTO: 94.1 FL (ref 80–99.9)
MONOCYTES ABSOLUTE: 0.09 E9/L (ref 0.1–0.95)
MONOCYTES RELATIVE PERCENT: 4 % (ref 2–12)
NEUTROPHILS ABSOLUTE: 1.45 E9/L (ref 1.8–7.3)
NEUTROPHILS RELATIVE PERCENT: 66 % (ref 43–80)
OVALOCYTES: ABNORMAL
PDW BLD-RTO: 17.1 FL (ref 11.5–15)
PLATELET # BLD: 116 E9/L (ref 130–450)
PMV BLD AUTO: 11.5 FL (ref 7–12)
POIKILOCYTES: ABNORMAL
POTASSIUM SERPL-SCNC: 3.7 MMOL/L (ref 3.5–5)
RBC # BLD: 2.86 E12/L (ref 3.8–5.8)
SODIUM BLD-SCNC: 138 MMOL/L (ref 132–146)
TARGET CELLS: ABNORMAL
TEAR DROP CELLS: ABNORMAL
TOTAL PROTEIN: 6.6 G/DL (ref 6.4–8.3)
TOXIC GRANULATION: ABNORMAL
WBC # BLD: 2.2 E9/L (ref 4.5–11.5)

## 2020-12-14 PROCEDURE — 85025 COMPLETE CBC W/AUTO DIFF WBC: CPT

## 2020-12-14 PROCEDURE — 36415 COLL VENOUS BLD VENIPUNCTURE: CPT

## 2020-12-14 PROCEDURE — 80053 COMPREHEN METABOLIC PANEL: CPT

## 2020-12-15 ENCOUNTER — HOSPITAL ENCOUNTER (OUTPATIENT)
Dept: INFUSION THERAPY | Age: 70
Discharge: HOME | End: 2020-12-15
Payer: MEDICARE

## 2020-12-15 ENCOUNTER — OFFICE VISIT (OUTPATIENT)
Dept: ONCOLOGY | Age: 70
End: 2020-12-15
Payer: MEDICARE

## 2020-12-15 VITALS
SYSTOLIC BLOOD PRESSURE: 137 MMHG | TEMPERATURE: 97.4 F | RESPIRATION RATE: 18 BRPM | DIASTOLIC BLOOD PRESSURE: 66 MMHG | HEART RATE: 70 BPM

## 2020-12-15 VITALS
HEIGHT: 73 IN | HEART RATE: 65 BPM | WEIGHT: 197 LBS | DIASTOLIC BLOOD PRESSURE: 68 MMHG | TEMPERATURE: 97.1 F | BODY MASS INDEX: 26.11 KG/M2 | OXYGEN SATURATION: 98 % | SYSTOLIC BLOOD PRESSURE: 130 MMHG

## 2020-12-15 DIAGNOSIS — C20 RECTAL CANCER METASTASIZED TO LIVER (HCC): ICD-10-CM

## 2020-12-15 DIAGNOSIS — C79.51 BONE METASTASIS (HCC): ICD-10-CM

## 2020-12-15 DIAGNOSIS — C18.9 MALIGNANT NEOPLASM OF COLON, UNSPECIFIED PART OF COLON (HCC): Primary | ICD-10-CM

## 2020-12-15 DIAGNOSIS — C78.7 RECTAL CANCER METASTASIZED TO LIVER (HCC): ICD-10-CM

## 2020-12-15 LAB — CEA: 4.3 NG/ML (ref 0–5.2)

## 2020-12-15 PROCEDURE — 96368 THER/DIAG CONCURRENT INF: CPT

## 2020-12-15 PROCEDURE — 6360000002 HC RX W HCPCS: Performed by: INTERNAL MEDICINE

## 2020-12-15 PROCEDURE — 96415 CHEMO IV INFUSION ADDL HR: CPT

## 2020-12-15 PROCEDURE — 2580000003 HC RX 258: Performed by: INTERNAL MEDICINE

## 2020-12-15 PROCEDURE — 99214 OFFICE O/P EST MOD 30 MIN: CPT | Performed by: INTERNAL MEDICINE

## 2020-12-15 PROCEDURE — 82378 CARCINOEMBRYONIC ANTIGEN: CPT

## 2020-12-15 PROCEDURE — 96417 CHEMO IV INFUS EACH ADDL SEQ: CPT

## 2020-12-15 PROCEDURE — 96375 TX/PRO/DX INJ NEW DRUG ADDON: CPT

## 2020-12-15 PROCEDURE — 96413 CHEMO IV INFUSION 1 HR: CPT

## 2020-12-15 PROCEDURE — 96411 CHEMO IV PUSH ADDL DRUG: CPT

## 2020-12-15 RX ORDER — HEPARIN SODIUM (PORCINE) LOCK FLUSH IV SOLN 100 UNIT/ML 100 UNIT/ML
500 SOLUTION INTRAVENOUS PRN
Status: DISCONTINUED | OUTPATIENT
Start: 2020-12-15 | End: 2020-12-16 | Stop reason: HOSPADM

## 2020-12-15 RX ORDER — DEXAMETHASONE SODIUM PHOSPHATE 10 MG/ML
10 INJECTION, SOLUTION INTRAMUSCULAR; INTRAVENOUS ONCE
Status: CANCELLED | OUTPATIENT
Start: 2020-12-15

## 2020-12-15 RX ORDER — SODIUM CHLORIDE 9 MG/ML
250 INJECTION, SOLUTION INTRAVENOUS CONTINUOUS
Status: DISCONTINUED | OUTPATIENT
Start: 2020-12-15 | End: 2020-12-16 | Stop reason: HOSPADM

## 2020-12-15 RX ORDER — DIPHENHYDRAMINE HYDROCHLORIDE 50 MG/ML
50 INJECTION INTRAMUSCULAR; INTRAVENOUS ONCE
Status: CANCELLED | OUTPATIENT
Start: 2020-12-15 | End: 2020-12-15

## 2020-12-15 RX ORDER — DEXAMETHASONE SODIUM PHOSPHATE 10 MG/ML
10 INJECTION INTRAMUSCULAR; INTRAVENOUS ONCE
Status: COMPLETED | OUTPATIENT
Start: 2020-12-15 | End: 2020-12-15

## 2020-12-15 RX ORDER — SODIUM CHLORIDE 0.9 % (FLUSH) 0.9 %
10 SYRINGE (ML) INJECTION PRN
Status: CANCELLED | OUTPATIENT
Start: 2020-12-15

## 2020-12-15 RX ORDER — SODIUM CHLORIDE 9 MG/ML
INJECTION, SOLUTION INTRAVENOUS CONTINUOUS
Status: CANCELLED | OUTPATIENT
Start: 2020-12-15

## 2020-12-15 RX ORDER — FLUOROURACIL 50 MG/ML
850 INJECTION, SOLUTION INTRAVENOUS ONCE
Status: CANCELLED | OUTPATIENT
Start: 2020-12-15

## 2020-12-15 RX ORDER — METHYLPREDNISOLONE SODIUM SUCCINATE 125 MG/2ML
125 INJECTION, POWDER, LYOPHILIZED, FOR SOLUTION INTRAMUSCULAR; INTRAVENOUS ONCE
Status: CANCELLED | OUTPATIENT
Start: 2020-12-15 | End: 2020-12-15

## 2020-12-15 RX ORDER — PALONOSETRON HYDROCHLORIDE 0.05 MG/ML
0.25 INJECTION, SOLUTION INTRAVENOUS ONCE
Status: COMPLETED | OUTPATIENT
Start: 2020-12-15 | End: 2020-12-15

## 2020-12-15 RX ORDER — EPINEPHRINE 1 MG/ML
0.3 INJECTION, SOLUTION, CONCENTRATE INTRAVENOUS PRN
Status: CANCELLED | OUTPATIENT
Start: 2020-12-15

## 2020-12-15 RX ORDER — SODIUM CHLORIDE 0.9 % (FLUSH) 0.9 %
10 SYRINGE (ML) INJECTION PRN
Status: DISCONTINUED | OUTPATIENT
Start: 2020-12-15 | End: 2020-12-16 | Stop reason: HOSPADM

## 2020-12-15 RX ORDER — SODIUM CHLORIDE 9 MG/ML
250 INJECTION, SOLUTION INTRAVENOUS CONTINUOUS
Status: CANCELLED | OUTPATIENT
Start: 2020-12-15 | End: 2021-01-02

## 2020-12-15 RX ORDER — ATROPINE SULFATE 0.4 MG/ML
0.4 AMPUL (ML) INJECTION ONCE
Status: COMPLETED | OUTPATIENT
Start: 2020-12-15 | End: 2020-12-15

## 2020-12-15 RX ORDER — FLUOROURACIL 50 MG/ML
850 INJECTION, SOLUTION INTRAVENOUS ONCE
Status: COMPLETED | OUTPATIENT
Start: 2020-12-15 | End: 2020-12-15

## 2020-12-15 RX ORDER — ATROPINE SULFATE 0.4 MG/ML
0.4 AMPUL (ML) INJECTION ONCE
Status: CANCELLED | OUTPATIENT
Start: 2020-12-15 | End: 2020-12-15

## 2020-12-15 RX ORDER — HEPARIN SODIUM (PORCINE) LOCK FLUSH IV SOLN 100 UNIT/ML 100 UNIT/ML
500 SOLUTION INTRAVENOUS PRN
Status: CANCELLED | OUTPATIENT
Start: 2020-12-15

## 2020-12-15 RX ORDER — 0.9 % SODIUM CHLORIDE 0.9 %
10 VIAL (ML) INJECTION ONCE
Status: CANCELLED | OUTPATIENT
Start: 2020-12-15 | End: 2020-12-15

## 2020-12-15 RX ORDER — PALONOSETRON HYDROCHLORIDE 0.05 MG/ML
0.25 INJECTION, SOLUTION INTRAVENOUS ONCE
Status: CANCELLED | OUTPATIENT
Start: 2020-12-15

## 2020-12-15 RX ADMIN — DENOSUMAB 120 MG: 120 INJECTION SUBCUTANEOUS at 09:06

## 2020-12-15 RX ADMIN — BEVACIZUMAB 400 MG: 400 INJECTION, SOLUTION INTRAVENOUS at 09:05

## 2020-12-15 RX ADMIN — Medication 500 UNITS: at 12:06

## 2020-12-15 RX ADMIN — SODIUM CHLORIDE 250 ML: 9 INJECTION, SOLUTION INTRAVENOUS at 08:41

## 2020-12-15 RX ADMIN — FLUOROURACIL 850 MG: 50 INJECTION, SOLUTION INTRAVENOUS at 12:00

## 2020-12-15 RX ADMIN — PALONOSETRON 0.25 MG: 0.25 INJECTION, SOLUTION INTRAVENOUS at 08:42

## 2020-12-15 RX ADMIN — SODIUM CHLORIDE 400 MG: 9 INJECTION, SOLUTION INTRAVENOUS at 10:04

## 2020-12-15 RX ADMIN — DEXAMETHASONE SODIUM PHOSPHATE 10 MG: 10 INJECTION INTRAMUSCULAR; INTRAVENOUS at 08:46

## 2020-12-15 RX ADMIN — ATROPINE SULFATE 0.4 MG: 0.4 INJECTION, SOLUTION INTRAMUSCULAR; INTRAVENOUS; SUBCUTANEOUS at 08:43

## 2020-12-15 RX ADMIN — SODIUM CHLORIDE, PRESERVATIVE FREE 10 ML: 5 INJECTION INTRAVENOUS at 12:06

## 2020-12-15 RX ADMIN — LEUCOVORIN CALCIUM 850 MG: 10 INJECTION INTRAMUSCULAR; INTRAVENOUS at 10:04

## 2020-12-15 NOTE — PROGRESS NOTES
[place] [id number]  Attending Clinic Note     Reason for Visit: Follow-up on a patient with Metastatic Rectal Cancer.     PCP: Stephen Mcmahon MD     History of Present Illness:  70 y/o  male who was referred to see Dr. Kiley Ortega (GI team) for evaluation of bright red blood per rectum, mild anemia and change in bowel habits with diarrhea. CEA 2640 on 10/19/2015. AlcP 299 AST 57 ALT 75 on 10/19/2015. Colonoscopy in 2013 noted no significant polyps, colitis or lesions at that time. Denies any Family History of colorectal cancer or polyps.     Colonoscopy on 10/19/2015 revealed:  1. Ascending polyp, 8 mm, hot snare: Tubulovillous adenoma. 2. Transverse polyp, 1 cm, hot snare: Serrated polyp most consistent with sessile serrated adenoma. 3. Five splenic flexure polyps, three - 5 mm, 7 mm, 8 mm, hot snare and biopsy: Four segments of Tubular Adenoma  4. Descending polyp, 5 mm, biopsy: Tubular adenoma. 5. Sigmoid polyp, 4 mm biopsy, Serrated polyp most consistent with sessile serrated adenoma. 6. Two rectal polyps, 4 mm biopsy: Serrated polyp most consistent with hyperplastic polyp. 7. Rectosigmoid colon mass (large mass approximately 65% circumference of the lumen; Very friable, firm and hard): Tubulovillous adenoma with associated focal erosion and fibroplasia.      CT scan abdomen/pelvis on 10/26/2015:  1. Small nodules at lung bases likely represent metastatic colon   cancer. 2. Extensive likely metastatic colon cancer throughout the liver.      3. Mild mural thickening and luminal narrowing in the   terminal ileum, otherwise nonspecific.      Colonoscopy with snare removal rectal mass was performed by Dr. Olamide Heard. Pathology proved:  Rectal polyp: Invasive adenocarcinoma involving villous adenoma and extending to the cauterized edge of excision. KRAS Mutation: Mutation detected. BRAF Mutation: Mutation not detected. 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     CEA 3488. Bone scan on 11/10/2015 noted no metastatic disease. CT chest on 11/10/2015 revealed Multiple pulmonary nodules in the upper and lower lobes consistent with metastatic disease;   Hepatic metastasis also visualized. 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 Mediport was placed. Cycle # 1 of FOLFOX + Avastin was on 11/23/2015. CEA was 4555 on 11/23/2015. Cycle # 2 of FOLFOX + Avastin was on 12/08/2015. CEA was 3160 on 12/08/2015. Cycle # 3 of FOLFOX + Avastin was on 12/22/2015. CEA was 2516 on 12/21/2015. Cycle # 4 of FOLFOX + Avastin was on 01/05/2016. CEA was 2098 on 01/05/2015. Cycle # 5 of FOLFOX + Avastin was on 01/19/2016. CEA was 1511 on 01/05/2015.     -Bone scan on 01/26/2016 noted no metastatic disease. CT chest on 01/26/2016 revealed Significant response to treatment with no visible residual nodules. CT scan abdomen/pelvis on 01/26/2016 noted Interval decreased size of multiple masses in the liver compatible with treatment response. Continue another 2 months of FOLFOX + Avastin and repeat scans. Cycle # 6 of FOLFOX + Avastin was on 02/02/2016.  on 02/02/2016. Cycle # 7 of FOLFOX + Avastin was on 02/16/2016.  on 02/16/2016. Cycle # 8 of FOLFOX + Avastin was on 03/01/2016.  on 03/01/2016. Cycle # 9 of FOLFOX + Avastin was on 03/15/2016.  on 03/15/2016. Cycle # 10 of FOLFOX + Avastin was on 03/29/2016. .4 on 03/29/2016. Cycle # 11 of FOLFOX + Avastin was on 04/12/2016. .4 on 04/12/2016.    Re-staging scans on 04/19/2016: CT Chest: clear lungs; no evidence of recurring pulmonary nodule; CT Abdomen/Pelvis: Further interval decrease in size of the multiple metastatic hepatic lesions, the largest lesion now measures 3.9 x 3.5 cm and previously measured 4.5 cm in maximum diameter. No mesenteric lymphadenopathy is identified; Bone Scan: No evidence of osseous metastasis. Continue same regimen and re-stage in 2-3 months. Cycle # 12 FOLFOX + Avastin was on 04/26/2016. .5 on 04/26/2016. Cycle # 13 FOLFOX + Avastin was on 05/10/2016. .3 on 05/10/2016. Cycle # 14 FOLFOX+AVASTIN was on 05/24/2016. .5 on 05/24/2016. Cycle # 15 FOLFOX + Avastin was on 06/07/2016. CEA 90.5 on 06/07/2016. Admitted to [facility] 06/13/2016-06/16/2016 for abdominal pain: EGD noted 1.5 cm clean based duodenal bulb ulceration s/p epinephrine and bicap per Dr. Sherif Liu. No active bleeding. A. Stomach, biopsy: Mild chronic gastritis, immunostain negative for Helicobacter  B. Esophagus, biopsy: Gastric glandular mucosa with prominent intestinal metaplasia (Mad[place]'s epithelium), negative for epithelial dysplasia, esophageal squamous mucosa not identified  Cycle # 16 FOLFOX (discontinued avastin (bevacizumab) given association of peptic ulcer disease and known association of GI perforation) was on 06/21/2016. Cycle # 17 FOLFOX was on 07/05/2016. CEA 52.6 on 07/19/2016. Cycle # 17 FOLFOX was on 07/05/2016. CEA 52.6 on 07/05/2016. CEA 47.6 on 07/19/2016.     Re-staging scans 07/19/2106: CT Chest negative for metastatic disease. CT Abdomen/Pelvis: Stable hepatic lesions; Question new mural thickening in the cecum. Bone Scan: New Let hip lesion suspicious for bone metastasis.       Increased CEA; new mural thickening in the cecum and new left hip lesion suspicious for bone metastasis are consistent with disease progression; He derived maximum benefit from FOLFOX/AVASTIN. D/C FOLFOX/AVASTIN. We recommended FOLFIRI second line therapy. Cycle # 1 FOLFIRI was on 08/02/2016. CEA 40.8 on 08/02/2016. Xgeva q4 weeks started on 08/02/2016. Cycle # 2 FOLFIRI was on 08/16/2016. CEA 31.7 on 08/16/2016. Cycle # 3 FOLFIRI (added Avastin) was on 08/30/2016 given that ulcers healed on EGD 08/15/2016 by Dr. Darya Ford; Protonix bid since avastin re-started. Colonoscopy on 08/29/2016 (to look at the cecal area) unremarkable. CEA 26.7 on 08/30/2016. Cycle # 4 FOLFIRI/Avastin was on 09/13/2016. CEA 23.4 on 09/13/2016. Cycle # 5 FOLFIRI/Avastin was on 09/27/2016. CEA 22.3 on 09/27/2016. Cycle # 6 FOLFIRI/Avastin was on 10/11/2016. CEA 18.4 on 10/11/2016.      Bone scan 10/21/2016 stable bone metastasis; ? New lesion anterior left sixth rib likely interval healing fracture. CT abdomen/pelvis revealed stable hepatic metastasis. CT chest negative for metastatic disease. Continue FOLFIRI/Avastin and repeat scans in 3 months. Cycle # 7 FOLFIRI/Avastin was on 10/25/2016. CEA 16.1  Cycle # 8 FOLFIRI/Avastin was on 11/08/2016. CEA 15.7  Cycle # 9 FOLFIRI/Avastin was on 11/29/2016. CEA 13.6 on 11/29/2016. Cycle # 10 FOLFIRI/Avastin was on 12/13/2016. CEA 10.6 on 12/13/2016. Cycle # 11 FOLFIRI/Avastin was on 12/27/2016. CEA 11.1 on 12/27/2016. Cycle # 12 FOLFIRI/Avastin was on 01/10/2017.  CEA 9.7 on 01/10/2017.    CT chest 01/23/2017 No new pulmonary nodule or lymphadenopathy. Patchy groundglass opacities within the left lower lobe, suggestive of infectious or inflammatory etiology. Followup CT chest in 3 months. Slight increased linear sclerosis along the left anterior sixth rib corresponding to an area of increased uptake on the prior bone scan from 10/21/2016, favored to be related to interval healing changes of a nondisplaced fracture. CT abdomen/pelvis on 01/23/2017 Redemonstration of multiple hepatic metastases, which are similar compared to the prior CT from 10/21/2016. Redemonstration of area of increased sclerosis along the right acetabulum suspicious for metastatic disease. Bone scan on 01/23/2017 Stable subtle uptake within the left proximal diaphysis, again suggestive of metastatic disease  Decreased subtle uptake within the medial right acetabulum. Decreased uptake within the left anterior sixth rib corresponding to linear sclerosis on the recent CT, favored to be related to an joint rib fracture. Continue FOLFIRI + Avastin and repeat scans in 3 months. Cycle # 13 FOLFIRI + Avastin was on 01/24/2017. Cycle # 14 FOLFIRI + Avastin was on 02/07/2017. Cycle # 15 FOLFIRI + Avastin was on 02/21/2017. Cycle # 16 FOLFIRI + Avastin was on 03/07/2017. Cycle # 17 FOLFIRI + Avastin was on 03/21/2017. Cycle # 18 FOLFIRI + Avastin was on 04/04/2017. CT chest 04/17/2017 negative for metastatic disease. CT abdomen/pelvis 04/17/2017 Stable hypodense metastatic lesions within the liver. Stable area of increased sclerosis along the right acetabulum  Bone scan 04/17/2017 Stable subtle uptake within the left proximal femoral diaphysis; No definite abnormal uptake in the region of a sclerotic lesion along the posterior column of the right acetabulum noted on CT. Stable slight uptake within the left anterior sixth rib corresponding to linear sclerosis on the recent CT, favored to be related to a fracture. Continue FOLFIRI + Avastin and repeat scans in 3 months. Cycle # 19 FOLFIRI + Avastin was on 04/18/2017. Cycle # 20 FOLFIRI + Avastin was on 05/02/2017. Cycle # 21 FOLFIRI + Avastin was on 05/16/2017. Cycle # 22 FOLFIRI + Avastin was on 05/30/2017. Cycle # 23 FOLFIRI + Avastin was on 06/13/2017. Cycle # 24 FOLFIRI + Avastin was on 06/27/2017. Cycle # 25 FOLFIRI + Avastin was on 07/11/2017. Cycle # 26 FOLFIRI + Avastin was on 07/25/2017. Cycle # 27 FOLFIRI + Avastin was on 08/08/2017. Cycle # 28 FOLFIRI + Avastin was on 08/22/2017. Cycle # 29 FOLFIRI + Avastin was on 09/05/2017. Cycle # 30 FOLFIRI + Avastin was on 09/19/2017. Bone scan 09/26/2017 stable. CT abdomen/pelvis on 09/26/2017 Stable hypodense mass in the liver. Stable sclerotic lesion in the acetabulum. CT chest 09/26/2017 negative for metastatic disease. Cycle # 31 FOLFIRI + Avastin was on 10/03/2017. CEA 7.4 on 10/03/2017. Cycle # 32 FOLFIRI + Avastin was on 10/17/2017. CEA 6.0 on 10/17/2017. Cycle # 33 FOLFIRI + Avastin was on 10/31/2017. CEA 6.8 on 10/31/2017. Cycle # 34 FOLFIRI + Avastin was on 11/14/2017. CEA 7.2 on 11/14/2017. Cycle # 35 FOLFIRI + Avastin was on 11/28/2017. CEA 5.1 on 11/28/2017. Cycle # 36 FOLFIRI + Avastin was on 12/12/2017. CEA 6.1 on 12/12/2017. Bone scan 12/22/2017 noted no evidence of metastatic disease to the axial or appendicular skeleton. CT chest 12/22/2017 noted no evidence of metastatic disease. CT abdomen/pelvis 12/22/2017 noted stable hypodense lesions in the liver. Continue FOLFIRI + Avastin and repeat scans in 3 months. Cycle # 37 FOLFIRI + Avastin was on 12/27/2018. Cycle # 38 FOLFIRI + Avastin was on 01/09/2018. Cycle # 39 FOLFIRI + Avastin was on 01/23/2018. Cycle # 40 FOLFIRI + Avastin was on 02/06/2018. Cycle # 41 FOLFIRI + Avastin was on 02/20/2018. Cycle # 42 FOLFIRI + Avastin was on 03/06/2018. Cycle # 43 FOLFIRI + Avastin was on 03/20/2018. Bone scan on 03/26/2018 negative for metastatic disease. CT chest 03/26/2018 noted ? new 7 mm nodule in LUCY. CT abdomen/pelvis 03/26/2018 noted stable hypodense lesions in the liver. ? New small ascites in the abdomen. Patient wants to continue to monitor the lung finding and repeat scans in 2 months instead of changing the current regimen into oral Lonsurf. Cycle # 44 FOLFIRI + Avastin was on 04/03/2018. CEA 6.3 on 04/03/2018. Cycle # 45 FOLFIRI + Avastin was on 04/24/2018. CEA 5.3 on 04/24/2018. Cycle # 46 FOLFIRI + Avastin was on 05/08/2018. CEA 5.4 on 05/08/2018. Cycle # 47 FOLFIRI + Avastin was on 05/22/2018. CEA 6.1 on 05/22/2018. CT chest 05/31/2018 noted stable nodule in LUCY. No evidence of worsening malignancy. CT abdomen/pelvis on 05/31/2018 noted stable liver metastasis. No evidence of worsening malignancy. Continue FOLFIRI + Avastin and repeat scans in 3 months. Cycle # 48 FOLFIRI + Avastin was on 06/06/2018. Cycle # 49 FOLFIRI + Avastin was on 06/19/2018. Cycle # 50 FOLFIRI + Avastin was on 07/03/2018. Cycle # 51 FOLFIRI + Avastin was on 07/24/2018. Cycle # 52 FOLFIRI + Avastin was on 08/14/2018. Cycle # 53 FOLFIRI + Avastin was on 08/28/2018. CT chest 09/06/2018 noted interval decrease in size of LUCY measuring up to 4 mm, suggestive of treatment response. No mediastinal or hilar LN  CT abdomen/pelvis 09/06/2018 Slight interval increase in size of a previously noted metastatic lesion within the right hepatic lobe. This may be related to differences in phase of enhancement compared to the most recent study from 5/31/2018, the lesion remains decreased in size compared to the more previous studies. No abdominal, retroperitoneal, or pelvic lymphadenopathy. Continue FOLFIRI + Avastin and repeat scans in 2 months. Cycle # 54 FOLFIRI + Avastin was on 09/11/2018. Cycle # 55 FOLFIRI + Avastin was on 09/25/2018. Cycle # 56 FOLFIRI + Avastin was on 10/09/2018. Cycle # 57 FOLFIRI + Avastin was on 10/23/2018. CT chest 11/02/2018 stable 3 mm nodule within LUCY. No new right and left lung nodule. No mediastinal or osseous lesion. CT abdomen/pelvis 11/02/2018 stable metastatic disease to liver. No new metastatic disease identified. No mesenteric or retroperitoneal LN. No gross colonic lesion identified. Continue FOLFIRI + Avastin and repeat scans in 3 months. Cycle # 58 FOLFIRI + Avastin was on 11/06/2018. CEA 7.6 on 11/05/2018. Cycle # 59 FOLFIRI + Avastin was on 11/27/2018. CEA 6.9 on 11/26/2018. Cycle # 60 FOLFIRI + Avastin was on 12/11/2018. CEA 6.7 on 12/11/2018. Cycle # 61 FOLFIRI + Avastin was on 01/08/2019. CEA 5.6 on 01/07/2019. Cycle # 62 FOLFIRI + Avastin was on 01/29/2019. CEA 4.6 on 01/28/2019. Cycle # 63 FOLFIRI + Avastin was on 02/12/2019. CEA 4.3 on 02/11/2019. CT chest 02/21/2019 no evidence of metastatic disease. CT abdomen/pelvis 02/21/2019 stable hypodense liver lesions. No evidence of worsening metastatic disease. 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 Ordered MRI thoracic spine and referred to neurosurgery team.    Cycle # 64 FOLFIRI + Avastin was on 02/26/2019. CEA 4.7 on 02/25/2019. Cycle # 65 FOLFIRI + Avastin was on 03/12/2019. CEA 4.0 on 03/11/2019. Cycle # 66 FOLFIRI + Avastin was on 03/26/2019. CEA 4.7 on 03/25/2019. Cycle # 67 FOLFIRI + Avastin was on 04/09/2019. CEA 5.6 on 04/08/2019. Cycle # 68 FOLFIRI + Avastin was on 04/30/2019. CEA 4.9 on 04/29/2019. Cycle # 69 FOLFIRI + Avastin was on 05/14/2019. CEA 5.3 on 05/14/2019. CT chest 05/23/2019 stable small lung nodule with no evidence of metastatic disease. CT abdomen/pelvis 05/23/2019 Decrease conspicuity of the liver lesions. No new lesions seen. Stable splenomegaly. Cycle # 84 FOLFIRI + Avastin was on 02/18/2020. CEA 4.6 on 02/17/2020. EGD by Dr. Venus Medrano 03/02/2020 showing no masses or lesions  Cycle # 85 FOLFIRI + Avastin was on 03/03/2020. CEA 7.4 on 03/02/2020. Cycle # 86 FOLFIRI + Avastin was on 03/17/2020. CEA 4.5 on 03/16/2020. Colonoscopy on 03/23/2020 by Dr. Venus Medrano unremarkable (records requested). Cycle # 87 FOLFIRI + Avastin was on 03/31/2020. CEA 4.1 on 03/30/2020. Cycle # 88 FOLFIRI + Avastin was on 04/21/2020. CEA 6.4 on 04/20/2020. Cycle # 89 FOLFIRI + Avastin was on 05/05/2020. CEA 5.8 on 05/04/2020. Cycle # 90 FOLFIRI + Avastin was on 06/02/2020. CEA 5.5 on 06/01/2020. Cycle # 91 FOLFIRI + Avastin was on 06/16/2020. CEA 5.6 on 06/15/2020. CT chest 06/23/2020 noted no metastatic disease in the chest.   CT abdomen/pelvis 06/23/2020 Stable small liver lesions measuring up to 5 mm since 2019.   22 mm round mass in segment 8 of the liver with central high density stable from December 2019), previously measuring up to 34 mm in 2017. No additional metastatic disease in the abdomen or pelvis. Small amount of ascites. Overall stable disease. Continue FOLFIRI + Avastin and repeat scans in 2-3 months. Cycle # 92 FOLFIRI + Avastin was on 07/07/2020. CEA 5.7 on 07/06/2020. Cycle # 93 FOLFIRI + Avastin was on 07/21/2020. CEA 5.9 on 07/20/2020. Cycle # 94 FOLFIRI + Avastin was on 08/04/2020. CEA 5.5 on 08/04/2020. Cycle # 95 FOLFIRI + Avastin was on 08/25/2020. CEA 6.1 on 08/24/2020. CT chest 9/2/2020 stable unremarkable enhanced CT of the thorax. There is no metastatic disease to the lungs. CT abdomen pelvis on 9/2/2020 stable 2.2 cm low attenuated lesion within the central aspect of the right hepatic lobe favored to represent treated metastatic disease. No new hepatic lesion is identified. Stable splenomegaly  There is no appreciable colonic lesion or stricture  Pericholecystic fluid of uncertain etiology. General surgery team consulted. Laparoscopic cholecystectomy with normal cholangiogram 10/27/20  Gallbladder: Chronic cholecystitis and cholelithiasis.  Unremarkable regional lymph node. 11/13/2020; Seen by Dr. Bertha Mckeon from General surgery team; cleared to re-start chemotherapy. Cycle # 96 FOLFIRI + Avastin was on 11/17/2020. CEA 3.6 on 11/16/2020. Cycle # 97 FOLFIRI + Avastin was on 12/01/2020. CEA 3.5 on 11/30/2020. Cycle # 98 FOLFIRI + Avastin is today 12/15/2020. Today 12/15/2020. No fever, chills. No chest pain or SOB. No nausea or vomiting. No diarrhea. Fatigue    Review of Systems;  CONSTITUTIONAL: No fever, chills. Fair appetite. Fatigue. ENMT: Eyes: No diplopia; Nose: No epistaxis. Mouth:No lesions  RESPIRATORY: No hemoptysis, shortness of breath. CARDIOVASCULAR: No chest pain, palpitations. GASTROINTESTINAL: No nausea/vomiting, abdominal pain. GENITOURINARY: No dysuria, urinary frequency, hematuria. NEURO: No syncope, presyncope, headache. Remainder ROS: Negative. Past Medical History:   Past Medical History               Diagnosis Date    Arthritis      Cancer ([facility] [address]         colon    Depression      Hyperlipidemia      Hypertension           Medications:  Reviewed and reconciled.     Allergies: Allergies   Allergen Reactions    Neosporin [Neomycin-Polymyxin-Gramicidin] Rash    Tape [place] Just Tape] Rash      Physical Exam:  /68 (Site: Right Upper Arm, Position: Sitting, Cuff Size: Medium Adult)   Pulse 65   Temp 97.1 °F (36.2 °C) (Temporal)   Ht 6' 1\" (1.854 m)   Wt 197 lb (89.4 kg)   SpO2 98%   BMI 25.99 kg/m²   GENERAL: Alert, oriented x 3, not in acute distress  HEENT: PERRLA, EOMI. No oral lesions. NECK: Supple. Without lymphadenopathy. LUNGS: Lungs are CTA bilaterally, with no wheezing, crackles or rhonchi. CARDIOVASCULAR: Regular rhythm. No murmurs, rubs or gallops. ABDOMEN: Soft. Non-tender, non-distended. Positive bowel sounds. EXTREMITIES: Without clubbing, cyanosis or edema. NEUROLOGIC: No focal deficits. ECOG PS 1    Diagnostics:  Lab Results   Component Value Date    WBC 2.2 (L) 12/14/2020    HGB 8.7 (L) 12/14/2020    HCT 26.9 (L) 12/14/2020    MCV 94.1 12/14/2020     (L) 12/14/2020     Lab Results   Component Value Date     12/14/2020    K 3.7 12/14/2020     12/14/2020    CO2 24 12/14/2020    BUN 16 12/14/2020    CREATININE 1.2 12/14/2020    GLUCOSE 117 (H) 12/14/2020    CALCIUM 9.0 12/14/2020    PROT 6.6 12/14/2020    LABALBU 3.7 12/14/2020    BILITOT 0.7 12/14/2020    ALKPHOS 170 (H) 12/14/2020    AST 25 12/14/2020    ALT 17 12/14/2020    LABGLOM 60 12/14/2020    GFRAA >60 12/14/2020     Lab Results   Component Value Date    CEA 3.5 11/30/2020      Impression/Plan:  68 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 KRAS Mutation: Mutation detected. BRAF Mutation: Mutation not detected. 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 CT scan abdomen/pelvis on 10/26/2015 revealed small nodules at the lung bases, extensive liver lesions consistent with metastatic rectosigmoid cancer. CEA 3488 on 10/30/2015. Bone scan on 11/10/2015 noted no metastatic disease. CT chest on 11/10/2015 revealed Multiple pulmonary nodules in the upper and lower lobes consistent with metastatic disease; Hepatic metastasis also visualized. 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 Mediport was placed. Cycle # 1 of FOLFOX + Avastin was on 11/23/2015. CEA was 4555 on 11/23/2015. Cycle # 2 of FOLFOX + Avastin was on 12/08/2015. CEA was 3160 on 12/08/2015. Cycle # 3 of FOLFOX + Avastin was on 12/22/2015. CEA was 2516 on 12/21/2015. Cycle # 4 of FOLFOX + Avastin was on 01/05/2016. CEA was 2098 on 01/05/2015. Cycle # 5 of FOLFOX + Avastin was on 01/19/2016.  CEA was 1511 on 01/05/2015.    -Bone scan on 01/26/2016 noted no metastatic disease. CT chest on 01/26/2016 revealed Significant response to treatment with no visible residual nodules. CT scan abdomen/pelvis on 01/26/2016 noted Interval decreased size of multiple masses in the liver compatible with treatment response. Continue another 2 months of FOLFOX + Avastin and repeat scans. Cycle # 6 of FOLFOX + Avastin was on 02/02/2016.  on 02/02/2016. Cycle # 7 of FOLFOX + Avastin was on 02/16/2016.  on 02/16/2016. Cycle # 8 of FOLFOX + Avastin was on 03/01/2016.  on 03/01/2016. Cycle # 9 of FOLFOX + Avastin was on 03/15/2016.  on 03/15/2016. Cycle # 10 of FOLFOX + Avastin was on 03/29/2016. .4 on 03/29/2016. Cycle # 11 of FOLFOX + Avastin was on 04/12/2016. .4 on 04/12/2016.     Re-staging scans on 04/19/2016: CT Chest: clear lungs; no evidence of recurring pulmonary nodule; CT Abdomen/Pelvis: Further interval decrease in size of the multiple metastatic hepatic lesions, the largest lesion now measures 3.9 x 3.5 cm and previously measured 4.5 cm in maximum diameter. No mesenteric lymphadenopathy is identified; Bone Scan: No evidence of osseous metastasis. Continue same regimen and re-stage in 2-3 months. Cycle # 12 FOLFOX + Avastin was on 04/26/2016. .5 on 04/26/2016. Cycle # 13 FOLFOX + Avastin was on 05/10/2016. .3 on 05/10/2016. Cycle # 14 FOLFOX+AVASTIN was on 05/24/2016. .5 on 05/24/2016. Cycle # 15 FOLFOX + Avastin was on 06/07/2016. CEA 90.5 on 06/07/2016. Admitted [facility]/13/2016-06/16/2016 for abdominal pain: EGD noted 1.5 cm clean based duodenal bulb ulceration s/p epinephrine and bicap per Dr. Evelyne Gordon. No active bleeding.    A. Stomach, biopsy: Mild chronic gastritis, immunostain negative for Helicobacter B. Esophagus, biopsy: Gastric glandular mucosa with prominent ntestinal metaplasia ([place]'s epithelium), negative for epithelial dysplasia, esophageal squamous mucosa not identified     Cycle # 16 FOLFOX (discontinued avastin (bevacizumab) given association of peptic ulcer disease and known association of GI perforation.) was on 06/21/2016. CEA 47 on 06/21/2016. Cycle # 17 FOLFOX was on 07/05/2016. CEA 52.6 on 07/05/2016. CEA 47.6 on 07/19/2016.     Re-staging scans 07/19/2106: CT Chest negative for metastatic disease. CT Abdomen/Pelvis: Stable hepatic lesions; Question new mural thickening in the cecum. Bone Scan: New Let hip lesion suspicious for bone metastasis.     Increased CEA; new mural thickening in the cecum and new left hip lesion suspicious for bone metastasis are consistent with disease progression; He derived maximum benefit from FOLFOX/AVASTIN. D/C FOLFOX/AVASTIN. We recommended FOLFIRI second line therapy. Cycle # 1 FOLFIRI was on 08/02/2016. CEA 40.8 on 08/02/2016. Xgeva q4 weeks started on 08/02/2016. Cycle # 2 FOLFIRI was on 08/16/2016. CEA 31.7 on 08/16/2016. Cycle # 3 FOLFIRI (added Avastin) was on 08/30/2016 given that ulcers healed on EGD 08/15/2016 by Dr. Cody Gonzalez; Protonix bid since avastin re-started. Colonoscopy on 08/29/2016 (to look at the cecal area) unremarkable. CEA 26.7 on 08/30/2016. Cycle # 4 FOLFIRI/Avastin was on 09/13/2016. CEA 23.4 on 09/13/2016. Cycle # 5 FOLFIRI/Avastin was on 09/27/2016. CEA 22.3 on 09/27/2016. Cycle # 6 FOLFIRI/Avastin was on 10/11/2016. CEA 18.4 on 10/11/2016.      Bone scan 10/21/2016 stable bone metastasis; ? New lesion anterior left sixth rib likely interval healing fracture. CT abdomen/pelvis revealed stable hepatic metastasis. CT chest negative for metastatic disease. Continue FOLFIRI/Avastin and repeat scans in 3 months. Cycle # 7 FOLFIRI/Avastin was on 10/25/2016. CEA 16.1 on 10/25/2016. Cycle # 8 FOLFIRI/Avastin was on 11/08/2016. CEA 15.7 on 11/08/2016. Cycle # 9 FOLFIRI/Avastin was on 11/29/2016. CEA 13.6 on 11/29/2016. Cycle # 10 FOLFIRI/Avastin was on 12/13/2016. CEA 10.6 on 12/13/2016. Cycle # 11 FOLFIRI/Avastin was on 12/27/2016. CEA 11.1 on 12/27/2016. Cycle # 12 FOLFIRI/Avastin was on 01/10/2017. CEA 9.7 on 01/10/2017. CT chest 01/23/2017 No new pulmonary nodule or lymphadenopathy. Patchy groundglass opacities within the left lower lobe, suggestive of infectious or inflammatory etiology. Followup CT chest in 3 months. Slight increased linear sclerosis along the left anterior sixth rib corresponding to an area of increased uptake on the prior bone scan from 10/21/2016, favored to be related to interval healing changes of a nondisplaced fracture. CT abdomen/pelvis on 01/23/2017 Redemonstration of multiple hepatic metastases, which are similar compared to the prior CT from 10/21/2016. Redemonstration of area of increased sclerosis along the right acetabulum suspicious for metastatic disease. Bone scan on 01/23/2017 Stable subtle uptake within the left proximal diaphysis, again suggestive of metastatic disease  Decreased subtle uptake within the medial right acetabulum. Decreased uptake within the left anterior sixth rib corresponding to linear sclerosis on the recent CT, favored to be related to an joint rib fracture. Continue FOLFIRI + Avastin and repeat scans in 3 months. Cycle # 13 FOLFIRI + Avastin was on 01/24/2017. Cycle # 14 FOLFIRI + Avastin was on 02/07/2017. Cycle # 15 FOLFIRI + Avastin was on 02/21/2017. Cycle # 16 FOLFIRI + Avastin was on 03/07/2017. Cycle # 17 FOLFIRI + Avastin was on 03/21/2017. Cycle # 18 FOLFIRI + Avastin was on 04/04/2017. CT chest 04/17/2017 negative for metastatic disease. CT abdomen/pelvis 04/17/2017 Stable hypodense metastatic lesions within the liver.  Stable area of increased sclerosis along the right acetabulum Bone scan 04/17/2017 Stable subtle uptake within the left proximal femoral diaphysis; No definite abnormal uptake in the region of a sclerotic lesion along the posterior column of the right acetabulum noted on CT. Stable slight uptake within the left anterior sixth rib corresponding to linear sclerosis on the recent CT, favored to be related to a fracture. Continue FOLFIRI + Avastin and repeat scans in 3 months. Cycle # 19 FOLFIRI + Avastin was on 04/18/2017. CEA 7.5 on 04/18/2017. Cycle # 20 FOLFIRI + Avastin was on 05/02/2017. CEA 7.7 on 05/02/2017. Cycle # 21 FOLFIRI + Avastin was on 05/16/2017. CEA 7.1 on 05/16/2017. Cycle # 22 FOLFIRI + Avastin was on 05/30/2017. CEA 8.2 on 05/30/2017. Cycle # 23 FOLFIRI + Avastin was on 06/13/2017. CEA 7.7 on 06/13/2017. Cycle # 24 FOLFIRI + Avastin was on 06/27/2017. CEA 6.6 on 06/27/2017. Re-staging scans 07/05/2017:  Bone scan stable proximal left femoral diaphysis, right acetabulum, and left anterior sixth rib lesions;  CT abdomen/pelvis stable hypodense metastatic lesions within the liver; CT chest without convincing evidence of metastatic disease. Cycle # 25 FOLFIRI + Avastin was on 07/11/2017. CEA 6.3 on 07/11/2017. Cycle # 26 FOLFIRI + Avastin was on 07/25/2017. CEA 7.3 on 07/25/2017. Cycle # 27 FOLFIRI + Avastin was on 08/08/2017. CEA 6.5 on 08/08/2017. Cycle # 28 FOLFIRI + Avastin was on 08/22/2017. CEA 5.9 on 08/22/2017. Cycle # 29 FOLFIRI + Avastin was on 09/05/2017. CEA 6.3 on 09/05/2017. Cycle # 30 FOLFIRI + Avastin was on 09/19/2017. CEA 6.3 on 09/19/2017. Bone scan 09/26/2017 stable. CT abdomen/pelvis on 09/26/2017 Stable hypodense mass in the liver. Stable sclerotic lesion in the acetabulum. CT chest 09/26/2017 negative for metastatic disease. Cycle # 31 FOLFIRI + Avastin was on 10/03/2017. CEA 7.4 on 10/03/2017. Cycle # 32 FOLFIRI + Avastin was on 10/17/2017. CEA 6.0 on 10/17/2017. Cycle # 33 FOLFIRI + Avastin was on 10/31/2017. CEA 6.8 on 10/31/2017. Cycle # 34 FOLFIRI + Avastin was on 11/14/2017. CEA 7.2 on 11/14/2017. Cycle # 35 FOLFIRI + Avastin was on 11/28/2017. CEA 5.1 on 11/28/2017. Cycle # 36 FOLFIRI + Avastin was on 12/12/2017. CEA 6.1 on 12/12/2017. Bone scan 12/22/2017 noted no evidence of metastatic disease to the axial or appendicular skeleton. CT chest 12/22/2017 noted no evidence of metastatic disease. CT abdomen/pelvis 12/22/2017 noted stable hypodense lesions in the liver. Continue FOLFIRI + Avastin and repeat scans in 3 months. Cycle # 37 FOLFIRI + Avastin was on 12/27/2018. CEA 6.7 on 12/27/2017. Cycle # 38 FOLFIRI + Avastin was on 01/09/2018. CEA 5.0 on 01/09/2018. Cycle # 39 FOLFIRI + Avastin was on 01/23/2018. CEA 6.5 on 01/23/2018. Cycle # 40 FOLFIRI + Avastin was on 02/06/2018. CEA 6.9 on 02/06/2018. Cycle # 41 FOLFIRI + Avastin was on 02/20/2018. CEA 6.4 on 02/20/2018. Cycle # 42 FOLFIRI + Avastin was on 03/06/2018. CEA 6.6 on 03/06/2018. Cycle # 43 FOLFIRI + Avastin was on 03/20/2018. CEA 5.7 on 03/20/2018. Bone scan on 03/26/2018 negative for metastatic disease. CT chest 03/26/2018 noted ? new 7 mm nodule in LUCY. CT abdomen/pelvis 03/26/2018 noted stable hypodense lesions in the liver. ? New small ascites in the abdomen. Patient wants to continue to monitor the lung finding and repeat scans in 2 months instead of changing the current chemotherapy regimen to oral Lonsurf. Cycle # 44 FOLFIRI + Avastin was on 04/03/2018. CEA 6.3 on 04/03/2018. Cycle # 45 FOLFIRI + Avastin was on 04/24/2018. CEA 5.3 on 04/24/2018. Cycle # 46 FOLFIRI + Avastin was on 05/08/2018. CEA 5.4 on 05/08/2018. Cycle # 47 FOLFIRI + Avastin was on 05/22/2018. CEA 6.1 on 05/22/2018. CT chest 05/31/2018 noted stable nodule in LUCY. No evidence of worsening malignancy. CT abdomen/pelvis on 05/31/2018 noted stable liver metastasis. No evidence of worsening malignancy. Continue FOLFIRI + Avastin and repeat scans in 3 months. Cycle # 48 FOLFIRI + Avastin was on 06/06/2018. CEA 6.3 on 06/05/2018. Cycle # 49 FOLFIRI + Avastin was on 06/19/2018. CEA 6.1 on 06/19/2018. Cycle # 50 FOLFIRI + Avastin was on 07/03/2018. CEA 7.4 on 07/03/2018. Cycle # 51 FOLFIRI + Avastin was on 07/24/2018. CEA 6.7 on 07/24/2018. Cycle # 52 FOLFIRI + Avastin was on 08/14/2018. CEA 6.8 on 08/14/2018. Cycle # 53 FOLFIRI + Avastin was on 08/28/2018. CEA 6.5 on 08/27/2018. CT chest 09/06/2018 noted interval decrease in size of LUCY measuring up to 4 mm, suggestive of treatment response. No mediastinal or hilar LN  CT abdomen/pelvis 09/06/2018 Slight interval increase in size of a previously noted metastatic lesion within the right hepatic lobe. This may be related to differences in phase of enhancement compared to the most recent study from 5/31/2018, the lesion remains decreased in size compared to the more previous studies. No abdominal, retroperitoneal, or pelvic lymphadenopathy. Continue FOLFIRI + Avastin and repeat scans in 2 months. Cycle # 54 FOLFIRI + Avastin was on 09/11/2018. CEA 6.4 on 09/10/2018. Cycle # 55 FOLFIRI + Avastin was on 09/25/2018. CEA 6.4 on 09/25/2018. Cycle # 56 FOLFIRI + Avastin was on 10/09/2018. CEA 6.7 on 10/08/2018. Cycle # 57 FOLFIRI + Avastin was on 10/23/2018. CEA 7.2 on 10/22/2018. CT chest 11/02/2018 stable 3 mm nodule within LUCY. No new right and left lung nodule. No mediastinal or osseous lesion. CT abdomen/pelvis 11/02/2018 stable metastatic disease to liver. No new metastatic disease identified. No mesenteric or retroperitoneal LN. No gross colonic lesion identified. Continue FOLFIRI + Avastin and repeat scans in 3 months. Cycle # 58 FOLFIRI + Avastin was on 11/06/2018. CEA 7.6 on 11/05/2018. Cycle # 59 FOLFIRI + Avastin was on 11/27/2018. CEA 6.9 on 11/26/2018. Cycle # 60 FOLFIRI + Avastin was on 12/11/2018. CEA 6.7 on 12/11/2018. Cycle # 77 FOLFIRI + Avastin was on 10/15/2019. CEA 4.6 on 10/15/2019. Cycle # 78 FOLFIRI + Avastin was on 10/29/2019. CEA 4.1 on 10/28/2019. Cycle # 79 FOLFIRI + Avastin was on 11/12/2019. CEA 4.3 on 11/12/2019. Cycle # 80 FOLFIRI + Avastin was on 12/10/2019. CEA 4.0 on 12/09/2019. CT chest 12/19/2019 Stable 3 mm nodule within the left upper lobe, similar to the previous studies dating back to 11/2/2018, however decreased in size compared to the CT from 3/26/2018. No thoracic LN. CT abdomen/pelvis 12/19/2019 Previously described small hypodense lesions are more conspicuous on the current study compared to the recent study throughout the liver from 8/20/2019, however similar to the prior study from 2/21/2019. Findings may be related to differences in contrast opacification. No abdominal or pelvic lymphadenopathy. Continue FOLFIRI + Avastin and repeat scans in 2 months to f/u on liver lesions. Cycle # 81 FOLFIRI + Avastin was on 01/07/2020. CEA 3.8 on 01/06/2020. Cycle # 82 FOLFIRI + Avastin was on 01/21/2020. CEA 3.7 on 01/20/2020. Cycle # 83 FOLFIRI + Avastin was on 02/04/2020. CEA 4.1 on 02/03/2020. Cycle # 84 FOLFIRI + Avastin was on 02/18/2020. CEA 4.6 on 02/17/2020. CT chest 2/28/2020 no evidence of metastatic disease to the lungs and no mediastinal or hilar adenopathy. CT abdomen pelvis 2/28/2020 no enhancing lesions seen within the liver to suggest metastatic disease. Wall thickening of the rectosigmoid junction. Images reviewed. Continue FOLFIRI Avastin and repeat scans in 3 months  EGD by Dr. Joseph Ledesma 03/02/2020 showing no masses or lesions. Cycle # 85 FOLFIRI + Avastin was on 03/03/2020. CEA 7.4 on 03/02/2020. Cycle # 86 FOLFIRI + Avastin was on 03/17/2020. CEA 4.5 on 03/16/2020. Colonoscopy on 03/23/2020 by Dr. Joseph aguero (records requested). Cycle # 87 FOLFIRI + Avastin was on 03/31/2020. CEA 4.1 on 03/30/2020. Cycle # 88 FOLFIRI + Avastin was on 04/21/2020. CEA 6.4 on 04/20/2020. Cycle # 89 FOLFIRI + Avastin was on 05/05/2020. CEA 5.8 on 05/04/2020. Cycle # 90 FOLFIRI + Avastin was on 06/02/2020. CEA 5.5 on 06/01/2020. Cycle # 91 FOLFIRI + Avastin was on 06/16/2020. CEA 5.6 on 06/15/2020. CT chest 06/23/2020 noted no metastatic disease in the chest.   CT abdomen/pelvis 06/23/2020 Stable small liver lesions measuring up to 5 mm since 2019.   22 mm round mass in segment 8 of the liver with central high density stable from December 2019), previously measuring up to 34 mm in 2017. No additional metastatic disease in the abdomen or pelvis. Small amount of ascites. Overall stable disease. Continue FOLFIRI + Avastin and repeat scans in 2-3 months. Cycle # 92 FOLFIRI + Avastin was on 07/07/2020. CEA 5.7 on 07/06/2020. Cycle # 93 FOLFIRI + Avastin was on 07/21/2020. CEA 5.9 on 07/20/2020. Cycle # 94 FOLFIRI + Avastin was on 08/04/2020. CEA 5.5 on 08/04/2020. Cycle # 95 FOLFIRI + Avastin was on 08/25/2020. CEA 6.1 on 08/24/2020. CT chest 9/2/2020 stable unremarkable enhanced CT of the thorax. There is no metastatic disease to the lungs. CT abdomen pelvis on 9/2/2020 stable 2.2 cm low attenuated lesion within the central aspect of the right hepatic lobe favored to represent treated metastatic disease. No new hepatic lesion is identified. Stable splenomegaly  There is no appreciable colonic lesion or stricture. Pericholecystic fluid of uncertain etiology. Laparoscopic cholecystectomy with normal cholangiogram 10/27/20  Gallbladder: Chronic cholecystitis and cholelithiasis.  Unremarkable regional lymph node. 11/13/2020; Seen by Dr. Izabella Love from General surgery team; cleared to re-start chemotherapy. Cycle # 96 FOLFIRI + Avastin was on 11/17/2020. CEA 3.6 on 11/16/2020. Cycle # 97 FOLFIRI + Avastin was on 12/01/2020. CEA 3.5 on 11/30/2020. Cycle # 98 FOLFIRI + Avastin is today 12/15/2020. CEA pending. RTC 2 weeks for Cycle # 99 FOLFIRI + Avastin.  Scans after next visit    MSI testing noted no mismatch repair protein loss of expression    12/15/2020  Naomi Cote MD  Board Certified Medical Oncologist

## 2020-12-15 NOTE — PROGRESS NOTES
Patient presents to clinic today for Xgeva injection. Patient's labs monitored, specifically, Calcium level, to ensure no increased risk of hypocalcemia with administration of the medication. Patient's calcium level is 9.0 mg/dL. Patient received therapy and will continue to be monitored prior to each dose.     Mackenzie Gu, [address] 12/15/2020 8:50 AM

## 2020-12-17 ENCOUNTER — HOSPITAL ENCOUNTER (OUTPATIENT)
Dept: INFUSION THERAPY | Age: 70
Discharge: HOME | End: 2020-12-17
Payer: MEDICARE

## 2020-12-17 DIAGNOSIS — Z51.11 ENCOUNTER FOR ANTINEOPLASTIC CHEMOTHERAPY: Primary | ICD-10-CM

## 2020-12-17 DIAGNOSIS — C78.7 RECTAL CANCER METASTASIZED TO LIVER (HCC): ICD-10-CM

## 2020-12-17 DIAGNOSIS — C20 RECTAL CANCER METASTASIZED TO LIVER (HCC): ICD-10-CM

## 2020-12-17 PROCEDURE — 6360000002 HC RX W HCPCS: Performed by: INTERNAL MEDICINE

## 2020-12-17 PROCEDURE — 96372 THER/PROPH/DIAG INJ SC/IM: CPT

## 2020-12-17 RX ADMIN — PEGFILGRASTIM-CBQV 6 MG: 6 INJECTION, SOLUTION SUBCUTANEOUS at 14:18

## 2021-01-04 ENCOUNTER — HOSPITAL ENCOUNTER (OUTPATIENT)
Age: 71
Discharge: HOME | End: 2021-01-04
Payer: MEDICARE

## 2021-01-04 LAB
ALBUMIN SERPL-MCNC: 3.6 G/DL (ref 3.5–5.2)
ALP BLD-CCNC: 158 U/L (ref 40–129)
ALT SERPL-CCNC: 19 U/L (ref 0–40)
ANION GAP SERPL CALCULATED.3IONS-SCNC: 9 MMOL/L (ref 7–16)
AST SERPL-CCNC: 31 U/L (ref 0–39)
BASOPHILS ABSOLUTE: 0.06 E9/L (ref 0–0.2)
BASOPHILS RELATIVE PERCENT: 0.9 % (ref 0–2)
BILIRUB SERPL-MCNC: 0.7 MG/DL (ref 0–1.2)
BUN BLDV-MCNC: 21 MG/DL (ref 8–23)
CALCIUM SERPL-MCNC: 9.3 MG/DL (ref 8.6–10.2)
CEA: 4.7 NG/ML (ref 0–5.2)
CHLORIDE BLD-SCNC: 105 MMOL/L (ref 98–107)
CO2: 26 MMOL/L (ref 22–29)
CREAT SERPL-MCNC: 1.3 MG/DL (ref 0.7–1.2)
EOSINOPHILS ABSOLUTE: 0.33 E9/L (ref 0.05–0.5)
EOSINOPHILS RELATIVE PERCENT: 5.2 % (ref 0–6)
GFR AFRICAN AMERICAN: >60
GFR NON-AFRICAN AMERICAN: 54 ML/MIN/1.73
GLUCOSE BLD-MCNC: 141 MG/DL (ref 74–99)
HCT VFR BLD CALC: 29.5 % (ref 37–54)
HEMOGLOBIN: 9.4 G/DL (ref 12.5–16.5)
LYMPHOCYTES ABSOLUTE: 0.32 E9/L (ref 1.5–4)
LYMPHOCYTES RELATIVE PERCENT: 5.2 % (ref 20–42)
MCH RBC QN AUTO: 30.3 PG (ref 26–35)
MCHC RBC AUTO-ENTMCNC: 31.9 % (ref 32–34.5)
MCV RBC AUTO: 95.2 FL (ref 80–99.9)
METAMYELOCYTES RELATIVE PERCENT: 0.9 % (ref 0–1)
MONOCYTES ABSOLUTE: 0.25 E9/L (ref 0.1–0.95)
MONOCYTES RELATIVE PERCENT: 4.3 % (ref 2–12)
NEUTROPHILS ABSOLUTE: 5.29 E9/L (ref 1.8–7.3)
NEUTROPHILS RELATIVE PERCENT: 83.5 % (ref 43–80)
PDW BLD-RTO: 18.7 FL (ref 11.5–15)
PLATELET # BLD: 140 E9/L (ref 130–450)
PMV BLD AUTO: 10.8 FL (ref 7–12)
POTASSIUM SERPL-SCNC: 4.2 MMOL/L (ref 3.5–5)
RBC # BLD: 3.1 E12/L (ref 3.8–5.8)
SODIUM BLD-SCNC: 140 MMOL/L (ref 132–146)
TOTAL PROTEIN: 6.6 G/DL (ref 6.4–8.3)
WBC # BLD: 6.3 E9/L (ref 4.5–11.5)

## 2021-01-04 PROCEDURE — 82378 CARCINOEMBRYONIC ANTIGEN: CPT

## 2021-01-04 PROCEDURE — 80053 COMPREHEN METABOLIC PANEL: CPT

## 2021-01-04 PROCEDURE — 36415 COLL VENOUS BLD VENIPUNCTURE: CPT

## 2021-01-04 PROCEDURE — 85025 COMPLETE CBC W/AUTO DIFF WBC: CPT

## 2021-01-05 ENCOUNTER — HOSPITAL ENCOUNTER (OUTPATIENT)
Dept: INFUSION THERAPY | Age: 71
Discharge: HOME | End: 2021-01-05
Payer: MEDICARE

## 2021-01-05 ENCOUNTER — OFFICE VISIT (OUTPATIENT)
Dept: ONCOLOGY | Age: 71
End: 2021-01-05
Payer: MEDICARE

## 2021-01-05 VITALS — SYSTOLIC BLOOD PRESSURE: 126 MMHG | DIASTOLIC BLOOD PRESSURE: 70 MMHG | HEART RATE: 62 BPM

## 2021-01-05 VITALS
WEIGHT: 195.2 LBS | HEIGHT: 73 IN | TEMPERATURE: 98.5 F | BODY MASS INDEX: 25.87 KG/M2 | OXYGEN SATURATION: 100 % | DIASTOLIC BLOOD PRESSURE: 60 MMHG | SYSTOLIC BLOOD PRESSURE: 110 MMHG | HEART RATE: 64 BPM

## 2021-01-05 DIAGNOSIS — C20 RECTAL CANCER METASTASIZED TO LIVER (HCC): Primary | ICD-10-CM

## 2021-01-05 DIAGNOSIS — C20 RECTAL CANCER (HCC): Primary | ICD-10-CM

## 2021-01-05 DIAGNOSIS — C78.7 RECTAL CANCER METASTASIZED TO LIVER (HCC): Primary | ICD-10-CM

## 2021-01-05 PROCEDURE — 96368 THER/DIAG CONCURRENT INF: CPT

## 2021-01-05 PROCEDURE — 6360000002 HC RX W HCPCS: Performed by: INTERNAL MEDICINE

## 2021-01-05 PROCEDURE — 99214 OFFICE O/P EST MOD 30 MIN: CPT | Performed by: INTERNAL MEDICINE

## 2021-01-05 PROCEDURE — 2580000003 HC RX 258: Performed by: INTERNAL MEDICINE

## 2021-01-05 PROCEDURE — 96413 CHEMO IV INFUSION 1 HR: CPT

## 2021-01-05 PROCEDURE — 96411 CHEMO IV PUSH ADDL DRUG: CPT

## 2021-01-05 PROCEDURE — 96417 CHEMO IV INFUS EACH ADDL SEQ: CPT

## 2021-01-05 PROCEDURE — 96375 TX/PRO/DX INJ NEW DRUG ADDON: CPT

## 2021-01-05 PROCEDURE — 96415 CHEMO IV INFUSION ADDL HR: CPT

## 2021-01-05 RX ORDER — EPINEPHRINE 1 MG/ML
0.3 INJECTION, SOLUTION, CONCENTRATE INTRAVENOUS PRN
Status: CANCELLED | OUTPATIENT
Start: 2021-01-05

## 2021-01-05 RX ORDER — SODIUM CHLORIDE 0.9 % (FLUSH) 0.9 %
10 SYRINGE (ML) INJECTION PRN
Status: DISCONTINUED | OUTPATIENT
Start: 2021-01-05 | End: 2021-01-06 | Stop reason: HOSPADM

## 2021-01-05 RX ORDER — HEPARIN SODIUM (PORCINE) LOCK FLUSH IV SOLN 100 UNIT/ML 100 UNIT/ML
500 SOLUTION INTRAVENOUS PRN
Status: DISCONTINUED | OUTPATIENT
Start: 2021-01-05 | End: 2021-01-06 | Stop reason: HOSPADM

## 2021-01-05 RX ORDER — DEXAMETHASONE SODIUM PHOSPHATE 10 MG/ML
10 INJECTION, SOLUTION INTRAMUSCULAR; INTRAVENOUS ONCE
Status: CANCELLED | OUTPATIENT
Start: 2021-01-05

## 2021-01-05 RX ORDER — SODIUM CHLORIDE 9 MG/ML
250 INJECTION, SOLUTION INTRAVENOUS CONTINUOUS
Status: CANCELLED | OUTPATIENT
Start: 2021-01-05 | End: 2021-01-23

## 2021-01-05 RX ORDER — 0.9 % SODIUM CHLORIDE 0.9 %
10 VIAL (ML) INJECTION ONCE
Status: CANCELLED | OUTPATIENT
Start: 2021-01-05 | End: 2021-01-05

## 2021-01-05 RX ORDER — ATROPINE SULFATE 0.4 MG/ML
0.4 AMPUL (ML) INJECTION ONCE
Status: CANCELLED | OUTPATIENT
Start: 2021-01-05 | End: 2021-01-05

## 2021-01-05 RX ORDER — FLUOROURACIL 50 MG/ML
850 INJECTION, SOLUTION INTRAVENOUS ONCE
Status: CANCELLED | OUTPATIENT
Start: 2021-01-05

## 2021-01-05 RX ORDER — PALONOSETRON HYDROCHLORIDE 0.05 MG/ML
0.25 INJECTION, SOLUTION INTRAVENOUS ONCE
Status: COMPLETED | OUTPATIENT
Start: 2021-01-05 | End: 2021-01-05

## 2021-01-05 RX ORDER — SODIUM CHLORIDE 9 MG/ML
250 INJECTION, SOLUTION INTRAVENOUS CONTINUOUS
Status: DISCONTINUED | OUTPATIENT
Start: 2021-01-05 | End: 2021-01-06 | Stop reason: HOSPADM

## 2021-01-05 RX ORDER — SODIUM CHLORIDE 0.9 % (FLUSH) 0.9 %
10 SYRINGE (ML) INJECTION PRN
Status: CANCELLED | OUTPATIENT
Start: 2021-01-05

## 2021-01-05 RX ORDER — DIPHENHYDRAMINE HYDROCHLORIDE 50 MG/ML
50 INJECTION INTRAMUSCULAR; INTRAVENOUS ONCE
Status: CANCELLED | OUTPATIENT
Start: 2021-01-05 | End: 2021-01-05

## 2021-01-05 RX ORDER — FLUOROURACIL 50 MG/ML
850 INJECTION, SOLUTION INTRAVENOUS ONCE
Status: COMPLETED | OUTPATIENT
Start: 2021-01-05 | End: 2021-01-05

## 2021-01-05 RX ORDER — PALONOSETRON HYDROCHLORIDE 0.05 MG/ML
0.25 INJECTION, SOLUTION INTRAVENOUS ONCE
Status: CANCELLED | OUTPATIENT
Start: 2021-01-05

## 2021-01-05 RX ORDER — DEXAMETHASONE SODIUM PHOSPHATE 10 MG/ML
10 INJECTION INTRAMUSCULAR; INTRAVENOUS ONCE
Status: COMPLETED | OUTPATIENT
Start: 2021-01-05 | End: 2021-01-05

## 2021-01-05 RX ORDER — ATROPINE SULFATE 0.4 MG/ML
0.4 AMPUL (ML) INJECTION ONCE
Status: COMPLETED | OUTPATIENT
Start: 2021-01-05 | End: 2021-01-05

## 2021-01-05 RX ORDER — SODIUM CHLORIDE 9 MG/ML
INJECTION, SOLUTION INTRAVENOUS CONTINUOUS
Status: CANCELLED | OUTPATIENT
Start: 2021-01-05

## 2021-01-05 RX ORDER — METHYLPREDNISOLONE SODIUM SUCCINATE 125 MG/2ML
125 INJECTION, POWDER, LYOPHILIZED, FOR SOLUTION INTRAMUSCULAR; INTRAVENOUS ONCE
Status: CANCELLED | OUTPATIENT
Start: 2021-01-05 | End: 2021-01-05

## 2021-01-05 RX ORDER — HEPARIN SODIUM (PORCINE) LOCK FLUSH IV SOLN 100 UNIT/ML 100 UNIT/ML
500 SOLUTION INTRAVENOUS PRN
Status: CANCELLED | OUTPATIENT
Start: 2021-01-05

## 2021-01-05 RX ADMIN — PALONOSETRON 0.25 MG: 0.25 INJECTION, SOLUTION INTRAVENOUS at 08:40

## 2021-01-05 RX ADMIN — SODIUM CHLORIDE 250 ML: 9 INJECTION, SOLUTION INTRAVENOUS at 08:33

## 2021-01-05 RX ADMIN — FLUOROURACIL 850 MG: 50 INJECTION, SOLUTION INTRAVENOUS at 11:32

## 2021-01-05 RX ADMIN — ATROPINE SULFATE 0.4 MG: 0.4 INJECTION, SOLUTION INTRAMUSCULAR; INTRAVENOUS; SUBCUTANEOUS at 08:44

## 2021-01-05 RX ADMIN — Medication 500 UNITS: at 11:37

## 2021-01-05 RX ADMIN — SODIUM CHLORIDE, PRESERVATIVE FREE 10 ML: 5 INJECTION INTRAVENOUS at 11:37

## 2021-01-05 RX ADMIN — DEXAMETHASONE SODIUM PHOSPHATE 10 MG: 10 INJECTION INTRAMUSCULAR; INTRAVENOUS at 08:46

## 2021-01-05 RX ADMIN — SODIUM CHLORIDE 400 MG: 9 INJECTION, SOLUTION INTRAVENOUS at 09:52

## 2021-01-05 RX ADMIN — BEVACIZUMAB 400 MG: 400 INJECTION, SOLUTION INTRAVENOUS at 09:05

## 2021-01-05 RX ADMIN — LEUCOVORIN CALCIUM 850 MG: 10 INJECTION INTRAMUSCULAR; INTRAVENOUS at 09:49

## 2021-01-05 NOTE — PROGRESS NOTES
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     CEA 3488. Bone scan on 11/10/2015 noted no metastatic disease. CT chest on 11/10/2015 revealed Multiple pulmonary nodules in the upper and lower lobes consistent with metastatic disease;   Hepatic metastasis also visualized. 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 Mediport was placed. Cycle # 1 of FOLFOX + Avastin was on 11/23/2015. CEA was 4555 on 11/23/2015. Cycle # 2 of FOLFOX + Avastin was on 12/08/2015. CEA was 3160 on 12/08/2015. Cycle # 3 of FOLFOX + Avastin was on 12/22/2015. CEA was 2516 on 12/21/2015. Cycle # 4 of FOLFOX + Avastin was on 01/05/2016. CEA was 2098 on 01/05/2015. Cycle # 5 of FOLFOX + Avastin was on 01/19/2016. CEA was 1511 on 01/05/2015.     -Bone scan on 01/26/2016 noted no metastatic disease. CT chest on 01/26/2016 revealed Significant response to treatment with no visible residual nodules. CT scan abdomen/pelvis on 01/26/2016 noted Interval decreased size of multiple masses in the liver compatible with treatment response. Continue another 2 months of FOLFOX + Avastin and repeat scans. Cycle # 6 of FOLFOX + Avastin was on 02/02/2016.  on 02/02/2016. Cycle # 7 of FOLFOX + Avastin was on 02/16/2016.  on 02/16/2016. Cycle # 8 of FOLFOX + Avastin was on 03/01/2016.  on 03/01/2016. Cycle # 9 of FOLFOX + Avastin was on 03/15/2016.  on 03/15/2016. Cycle # 10 of FOLFOX + Avastin was on 03/29/2016. .4 on 03/29/2016. Cycle # 11 of FOLFOX + Avastin was on 04/12/2016. .4 on 04/12/2016.    Re-staging scans on 04/19/2016: CT Chest: clear lungs; no evidence of recurring pulmonary nodule; CT Abdomen/Pelvis: Further interval decrease in size of the multiple metastatic hepatic lesions, the largest lesion now measures 3.9 x 3.5 cm and previously measured 4.5 cm in maximum diameter. No mesenteric lymphadenopathy is identified; Bone Scan: No evidence of osseous metastasis. Continue same regimen and re-stage in 2-3 months. Cycle # 12 FOLFOX + Avastin was on 04/26/2016. .5 on 04/26/2016. Cycle # 13 FOLFOX + Avastin was on 05/10/2016. .3 on 05/10/2016. Cycle # 14 FOLFOX+AVASTIN was on 05/24/2016. .5 on 05/24/2016. Cycle # 15 FOLFOX + Avastin was on 06/07/2016. CEA 90.5 on 06/07/2016. Admitted to [facility] 06/13/2016-06/16/2016 for abdominal pain: EGD noted 1.5 cm clean based duodenal bulb ulceration s/p epinephrine and bicap per Dr. Carl Bacon. No active bleeding. A. Stomach, biopsy: Mild chronic gastritis, immunostain negative for Helicobacter  B. Esophagus, biopsy: Gastric glandular mucosa with prominent intestinal metaplasia ([place]'s epithelium), negative for epithelial dysplasia, esophageal squamous mucosa not identified  Cycle # 16 FOLFOX (discontinued avastin (bevacizumab) given association of peptic ulcer disease and known association of GI perforation) was on 06/21/2016. Cycle # 17 FOLFOX was on 07/05/2016. CEA 52.6 on 07/19/2016. Cycle # 17 FOLFOX was on 07/05/2016. CEA 52.6 on 07/05/2016. CEA 47.6 on 07/19/2016.     Re-staging scans 07/19/2106: CT Chest negative for metastatic disease. CT Abdomen/Pelvis: Stable hepatic lesions; Question new mural thickening in the cecum. Bone Scan: New Let hip lesion suspicious for bone metastasis.       Increased CEA; new mural thickening in the cecum and new left hip lesion suspicious for bone metastasis are consistent with disease progression; He derived maximum benefit from FOLFOX/AVASTIN. D/C FOLFOX/AVASTIN. We recommended FOLFIRI second line therapy. Cycle # 1 FOLFIRI was on 08/02/2016. CEA 40.8 on 08/02/2016. Xgeva q4 weeks started on 08/02/2016. Cycle # 2 FOLFIRI was on 08/16/2016. CEA 31.7 on 08/16/2016. Cycle # 3 FOLFIRI (added Avastin) was on 08/30/2016 given that ulcers healed on EGD 08/15/2016 by Dr. Fredi Carrillo; Protonix bid since avastin re-started. Colonoscopy on 08/29/2016 (to look at the cecal area) unremarkable. CEA 26.7 on 08/30/2016. Cycle # 4 FOLFIRI/Avastin was on 09/13/2016. CEA 23.4 on 09/13/2016. Cycle # 5 FOLFIRI/Avastin was on 09/27/2016. CEA 22.3 on 09/27/2016. Cycle # 6 FOLFIRI/Avastin was on 10/11/2016. CEA 18.4 on 10/11/2016.      Bone scan 10/21/2016 stable bone metastasis; ? New lesion anterior left sixth rib likely interval healing fracture. CT abdomen/pelvis revealed stable hepatic metastasis. CT chest negative for metastatic disease. Continue FOLFIRI/Avastin and repeat scans in 3 months. Cycle # 7 FOLFIRI/Avastin was on 10/25/2016. CEA 16.1  Cycle # 8 FOLFIRI/Avastin was on 11/08/2016. CEA 15.7  Cycle # 9 FOLFIRI/Avastin was on 11/29/2016. CEA 13.6 on 11/29/2016. Cycle # 10 FOLFIRI/Avastin was on 12/13/2016. CEA 10.6 on 12/13/2016. Cycle # 11 FOLFIRI/Avastin was on 12/27/2016. CEA 11.1 on 12/27/2016. Cycle # 12 FOLFIRI/Avastin was on 01/10/2017.  CEA 9.7 on 01/10/2017.    CT chest 01/23/2017 No new pulmonary nodule or lymphadenopathy. Patchy groundglass opacities within the left lower lobe, suggestive of infectious or inflammatory etiology. Followup CT chest in 3 months. Slight increased linear sclerosis along the left anterior sixth rib corresponding to an area of increased uptake on the prior bone scan from 10/21/2016, favored to be related to interval healing changes of a nondisplaced fracture. CT abdomen/pelvis on 01/23/2017 Redemonstration of multiple hepatic metastases, which are similar compared to the prior CT from 10/21/2016. Redemonstration of area of increased sclerosis along the right acetabulum suspicious for metastatic disease. Bone scan on 01/23/2017 Stable subtle uptake within the left proximal diaphysis, again suggestive of metastatic disease  Decreased subtle uptake within the medial right acetabulum. Decreased uptake within the left anterior sixth rib corresponding to linear sclerosis on the recent CT, favored to be related to an joint rib fracture. Continue FOLFIRI + Avastin and repeat scans in 3 months. Cycle # 13 FOLFIRI + Avastin was on 01/24/2017. Cycle # 14 FOLFIRI + Avastin was on 02/07/2017. Cycle # 15 FOLFIRI + Avastin was on 02/21/2017. Cycle # 16 FOLFIRI + Avastin was on 03/07/2017. Cycle # 17 FOLFIRI + Avastin was on 03/21/2017. Cycle # 18 FOLFIRI + Avastin was on 04/04/2017. CT chest 04/17/2017 negative for metastatic disease. CT abdomen/pelvis 04/17/2017 Stable hypodense metastatic lesions within the liver. Stable area of increased sclerosis along the right acetabulum  Bone scan 04/17/2017 Stable subtle uptake within the left proximal femoral diaphysis; No definite abnormal uptake in the region of a sclerotic lesion along the posterior column of the right acetabulum noted on CT. Stable slight uptake within the left anterior sixth rib corresponding to linear sclerosis on the recent CT, favored to be related to a fracture. Continue FOLFIRI + Avastin and repeat scans in 3 months. Cycle # 19 FOLFIRI + Avastin was on 04/18/2017. Cycle # 20 FOLFIRI + Avastin was on 05/02/2017. Cycle # 21 FOLFIRI + Avastin was on 05/16/2017. Cycle # 22 FOLFIRI + Avastin was on 05/30/2017. Cycle # 23 FOLFIRI + Avastin was on 06/13/2017. Cycle # 24 FOLFIRI + Avastin was on 06/27/2017. Cycle # 25 FOLFIRI + Avastin was on 07/11/2017. Cycle # 26 FOLFIRI + Avastin was on 07/25/2017. Cycle # 27 FOLFIRI + Avastin was on 08/08/2017. Cycle # 28 FOLFIRI + Avastin was on 08/22/2017. Cycle # 29 FOLFIRI + Avastin was on 09/05/2017. Cycle # 30 FOLFIRI + Avastin was on 09/19/2017. Bone scan 09/26/2017 stable. CT abdomen/pelvis on 09/26/2017 Stable hypodense mass in the liver. Stable sclerotic lesion in the acetabulum. CT chest 09/26/2017 negative for metastatic disease. Cycle # 31 FOLFIRI + Avastin was on 10/03/2017. CEA 7.4 on 10/03/2017. Cycle # 32 FOLFIRI + Avastin was on 10/17/2017. CEA 6.0 on 10/17/2017. Cycle # 33 FOLFIRI + Avastin was on 10/31/2017. CEA 6.8 on 10/31/2017. Cycle # 34 FOLFIRI + Avastin was on 11/14/2017. CEA 7.2 on 11/14/2017. Cycle # 35 FOLFIRI + Avastin was on 11/28/2017. CEA 5.1 on 11/28/2017. Cycle # 36 FOLFIRI + Avastin was on 12/12/2017. CEA 6.1 on 12/12/2017. Bone scan 12/22/2017 noted no evidence of metastatic disease to the axial or appendicular skeleton. CT chest 12/22/2017 noted no evidence of metastatic disease. CT abdomen/pelvis 12/22/2017 noted stable hypodense lesions in the liver. Continue FOLFIRI + Avastin and repeat scans in 3 months. Cycle # 37 FOLFIRI + Avastin was on 12/27/2018. Cycle # 38 FOLFIRI + Avastin was on 01/09/2018. Cycle # 39 FOLFIRI + Avastin was on 01/23/2018. Cycle # 40 FOLFIRI + Avastin was on 02/06/2018. Cycle # 41 FOLFIRI + Avastin was on 02/20/2018. Cycle # 42 FOLFIRI + Avastin was on 03/06/2018. Cycle # 43 FOLFIRI + Avastin was on 03/20/2018. Bone scan on 03/26/2018 negative for metastatic disease. CT chest 03/26/2018 noted ? new 7 mm nodule in LUCY. CT abdomen/pelvis 03/26/2018 noted stable hypodense lesions in the liver. ? New small ascites in the abdomen. Patient wants to continue to monitor the lung finding and repeat scans in 2 months instead of changing the current regimen into oral Lonsurf. Cycle # 44 FOLFIRI + Avastin was on 04/03/2018. CEA 6.3 on 04/03/2018. Cycle # 45 FOLFIRI + Avastin was on 04/24/2018. CEA 5.3 on 04/24/2018. Cycle # 46 FOLFIRI + Avastin was on 05/08/2018. CEA 5.4 on 05/08/2018. Cycle # 47 FOLFIRI + Avastin was on 05/22/2018. CEA 6.1 on 05/22/2018. CT chest 05/31/2018 noted stable nodule in LUCY. No evidence of worsening malignancy. CT abdomen/pelvis on 05/31/2018 noted stable liver metastasis. No evidence of worsening malignancy. Continue FOLFIRI + Avastin and repeat scans in 3 months. Cycle # 48 FOLFIRI + Avastin was on 06/06/2018. Cycle # 49 FOLFIRI + Avastin was on 06/19/2018. Cycle # 50 FOLFIRI + Avastin was on 07/03/2018. Cycle # 51 FOLFIRI + Avastin was on 07/24/2018. Cycle # 52 FOLFIRI + Avastin was on 08/14/2018. Cycle # 53 FOLFIRI + Avastin was on 08/28/2018. CT chest 09/06/2018 noted interval decrease in size of LUCY measuring up to 4 mm, suggestive of treatment response. No mediastinal or hilar LN  CT abdomen/pelvis 09/06/2018 Slight interval increase in size of a previously noted metastatic lesion within the right hepatic lobe. This may be related to differences in phase of enhancement compared to the most recent study from 5/31/2018, the lesion remains decreased in size compared to the more previous studies. No abdominal, retroperitoneal, or pelvic lymphadenopathy. Continue FOLFIRI + Avastin and repeat scans in 2 months. Cycle # 54 FOLFIRI + Avastin was on 09/11/2018. Cycle # 55 FOLFIRI + Avastin was on 09/25/2018. Cycle # 56 FOLFIRI + Avastin was on 10/09/2018. Cycle # 57 FOLFIRI + Avastin was on 10/23/2018. CT chest 11/02/2018 stable 3 mm nodule within LUCY. No new right and left lung nodule. No mediastinal or osseous lesion. CT abdomen/pelvis 11/02/2018 stable metastatic disease to liver. No new metastatic disease identified. No mesenteric or retroperitoneal LN. No gross colonic lesion identified. Continue FOLFIRI + Avastin and repeat scans in 3 months. Cycle # 58 FOLFIRI + Avastin was on 11/06/2018. CEA 7.6 on 11/05/2018. Cycle # 59 FOLFIRI + Avastin was on 11/27/2018. CEA 6.9 on 11/26/2018. Cycle # 60 FOLFIRI + Avastin was on 12/11/2018. CEA 6.7 on 12/11/2018. Cycle # 61 FOLFIRI + Avastin was on 01/08/2019. CEA 5.6 on 01/07/2019. Cycle # 62 FOLFIRI + Avastin was on 01/29/2019. CEA 4.6 on 01/28/2019. Cycle # 63 FOLFIRI + Avastin was on 02/12/2019. CEA 4.3 on 02/11/2019. CT chest 02/21/2019 no evidence of metastatic disease. CT abdomen/pelvis 02/21/2019 stable hypodense liver lesions. No evidence of worsening metastatic disease. 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 Ordered MRI thoracic spine and referred to neurosurgery team.    Cycle # 64 FOLFIRI + Avastin was on 02/26/2019. CEA 4.7 on 02/25/2019. Cycle # 65 FOLFIRI + Avastin was on 03/12/2019. CEA 4.0 on 03/11/2019. Cycle # 66 FOLFIRI + Avastin was on 03/26/2019. CEA 4.7 on 03/25/2019. Cycle # 67 FOLFIRI + Avastin was on 04/09/2019. CEA 5.6 on 04/08/2019. Cycle # 68 FOLFIRI + Avastin was on 04/30/2019. CEA 4.9 on 04/29/2019. Cycle # 69 FOLFIRI + Avastin was on 05/14/2019. CEA 5.3 on 05/14/2019. CT chest 05/23/2019 stable small lung nodule with no evidence of metastatic disease. CT abdomen/pelvis 05/23/2019 Decrease conspicuity of the liver lesions. No new lesions seen. Stable splenomegaly. Continue FOLFIRI + Avastin and repeat scans in 3 months. Cycle # 70 FOLFIRI + Avastin was on 06/04/2019. CEA 4.8 on 06/03/2019. Cycle # 71 FOLFIRI + Avastin was on 06/18/2019. CEA 4.6 on 06/17/2019. Cycle # 72 FOLFIRI + Avastin was on 07/09/2019. CEA 4.3 on 07/08/2019. Cycle # 73 FOLFIRI + Avastin was on 07/30/2019. CEA 5.0 on 07/29/2019. Cycle # 74 FOLFIRI + Avastin was on 08/13/2019. CEA 5.0 on 08/12/2019. CT chest 08/20/2019 negative  CT abdomen/pelvis 08/20/2019 Previous identified hepatic lesions are not visualized on the current exam.  Continue FOLFIRI + Avastin and repeat scans in 3 months. Cycle # 75 FOLFIRI + Avastin was on 08/27/2019. CEA 5.0 on 08/26/2019. Cycle # 76 FOLFIRI + Avastin was on 09/17/2019. CEA 5.5 on 09/16/2019. Cycle # 77 FOLFIRI + Avastin was on 10/15/2019. CEA 4.6 on 10/15/2019. Cycle # 78 FOLFIRI + Avastin was on 10/29/2019. CEA 4.1 on 10/28/2019. Cycle # 79 FOLFIRI + Avastin was on 11/12/2019. CEA 4.3 on 11/12/2019. Cycle # 80 FOLFIRI + Avastin was on 12/10/2019. CEA 4.0 on 12/09/2019. CT chest 12/19/2019 Stable 3 mm nodule within the left upper lobe, similar to the previous studies dating back to 11/2/2018, however decreased in size compared to the CT from 3/26/2018. No thoracic LN. CT abdomen/pelvis 12/19/2019 Previously described small hypodense lesions are more conspicuous on the current study compared to the recent study throughout the liver from 8/20/2019, however similar to the prior study from 2/21/2019. Findings may be related to differences in contrast opacification. No abdominal or pelvic lymphadenopathy. Continue FOLFIRI + Avastin and repeat scans in 2 months to f/u on liver lesions. Cycle # 81 FOLFIRI + Avastin was on 01/07/2020. CEA 3.8 on 01/06/2020. Cycle # 82 FOLFIRI + Avastin was on 01/21/2020. CEA 3.7 on 01/20/2020. Cycle # 83 FOLFIRI + Avastin was on 02/04/2020. CEA 4.1 on 02/03/2020. Laparoscopic cholecystectomy with normal cholangiogram 10/27/20  Gallbladder: Chronic cholecystitis and cholelithiasis.  Unremarkable regional lymph node. 11/13/2020; Seen by Dr. Carlos Delarosa from General surgery team; cleared to re-start chemotherapy. Cycle # 96 FOLFIRI + Avastin was on 11/17/2020. CEA 3.6 on 11/16/2020. Cycle # 97 FOLFIRI + Avastin was on 12/01/2020. CEA 3.5 on 11/30/2020. Cycle # 98 FOLFIRI + Avastin was on 12/15/2020. CEA 4.3 on 12/15/2020. Cycle # 99 FOLFIRI + Avastin is today 01/05/2021. CEA 4.7 on 01/04/2021. Today 01/05/2021. No fever, chills. No chest pain or SOB. No nausea or vomiting. No diarrhea. Back pain follows with chiropractor    Review of Systems;  CONSTITUTIONAL: No fever, chills. Fair appetite and energy level. ENMT: Eyes: No diplopia; Nose: No epistaxis. Mouth:No lesions  RESPIRATORY: No hemoptysis, shortness of breath. CARDIOVASCULAR: No chest pain, palpitations. GASTROINTESTINAL: No nausea/vomiting, abdominal pain. GENITOURINARY: No dysuria, urinary frequency, hematuria. MSK: Back pain. NEURO: No syncope, presyncope, headache. Remainder ROS: Negative. Past Medical History:   Past Medical History               Diagnosis Date    Arthritis      Cancer ([facility] [address]         colon    Depression      Hyperlipidemia      Hypertension           Medications:  Reviewed and reconciled.     Allergies: Allergies   Allergen Reactions    Neosporin [Neomycin-Polymyxin-Gramicidin] Rash    Tape [place] Tape] Rash      Physical Exam:  /60 (Site: Right Upper Arm, Position: Sitting, Cuff Size: Large Adult)   Pulse 64   Temp 98.5 °F (36.9 °C) (Temporal)   Ht 6' 1\" (1.854 m)   Wt 195 lb 3.2 oz (88.5 kg)   SpO2 100%   BMI 25.75 kg/m²   GENERAL: Alert, oriented x 3, not in acute distress  HEENT: PERRLA, EOMI. No oral lesions. NECK: Supple. Without lymphadenopathy. LUNGS: Lungs are CTA bilaterally, with no wheezing, crackles or rhonchi. CARDIOVASCULAR: Regular rhythm. No murmurs, rubs or gallops. ABDOMEN: Soft. Non-tender, non-distended. Positive bowel sounds. EXTREMITIES: Without clubbing, cyanosis or edema. NEUROLOGIC: No focal deficits. ECOG PS 1    Diagnostics:  Lab Results   Component Value Date    WBC 6.3 01/04/2021    HGB 9.4 (L) 01/04/2021    HCT 29.5 (L) 01/04/2021    MCV 95.2 01/04/2021     01/04/2021     Lab Results   Component Value Date     01/04/2021    K 4.2 01/04/2021     01/04/2021    CO2 26 01/04/2021    BUN 21 01/04/2021    CREATININE 1.3 (H) 01/04/2021    GLUCOSE 141 (H) 01/04/2021    CALCIUM 9.3 01/04/2021    PROT 6.6 01/04/2021    LABALBU 3.6 01/04/2021    BILITOT 0.7 01/04/2021    ALKPHOS 158 (H) 01/04/2021    AST 31 01/04/2021    ALT 19 01/04/2021    LABGLOM 54 01/04/2021    GFRAA >60 01/04/2021     Lab Results   Component Value Date    CEA 4.7 01/04/2021      Impression/Plan:  68 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 KRAS Mutation: Mutation detected. BRAF Mutation: Mutation not detected. 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 CT scan abdomen/pelvis on 10/26/2015 revealed small nodules at the lung bases, extensive liver lesions consistent with metastatic rectosigmoid cancer. CEA 3488 on 10/30/2015. Bone scan on 11/10/2015 noted no metastatic disease. CT chest on 11/10/2015 revealed Multiple pulmonary nodules in the upper and lower lobes consistent with metastatic disease; Hepatic metastasis also visualized. 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 Mediport was placed. Cycle # 1 of FOLFOX + Avastin was on 11/23/2015. CEA was 4555 on 11/23/2015. Cycle # 2 of FOLFOX + Avastin was on 12/08/2015. CEA was 3160 on 12/08/2015. Cycle # 3 of FOLFOX + Avastin was on 12/22/2015. CEA was 2516 on 12/21/2015. Cycle # 4 of FOLFOX + Avastin was on 01/05/2016. CEA was 2098 on 01/05/2015. Cycle # 5 of FOLFOX + Avastin was on 01/19/2016. CEA was 1511 on 01/05/2015.     -Bone scan on 01/26/2016 noted no metastatic disease. CT chest on 01/26/2016 revealed Significant response to treatment with no visible residual nodules. CT scan abdomen/pelvis on 01/26/2016 noted Interval decreased size of multiple masses in the liver compatible with treatment response. Continue another 2 months of FOLFOX + Avastin and repeat scans. Cycle # 6 of FOLFOX + Avastin was on 02/02/2016.  on 02/02/2016. Cycle # 7 of FOLFOX + Avastin was on 02/16/2016.  on 02/16/2016. Cycle # 8 of FOLFOX + Avastin was on 03/01/2016.  on 03/01/2016. Cycle # 9 of FOLFOX + Avastin was on 03/15/2016.  on 03/15/2016. Cycle # 10 of FOLFOX + Avastin was on 03/29/2016. .4 on 03/29/2016. Cycle # 11 of FOLFOX + Avastin was on 04/12/2016. .4 on 04/12/2016.     Re-staging scans on 04/19/2016: CT Chest: clear lungs; no evidence of recurring pulmonary nodule; CT Abdomen/Pelvis: Further interval decrease in size of the multiple metastatic hepatic lesions, the largest lesion now measures 3.9 x 3.5 cm and previously measured 4.5 cm in maximum diameter. No mesenteric lymphadenopathy is identified; Bone Scan: No evidence of osseous metastasis. Continue same regimen and re-stage in 2-3 months. Cycle # 12 FOLFOX + Avastin was on 04/26/2016. .5 on 04/26/2016. Cycle # 13 FOLFOX + Avastin was on 05/10/2016. .3 on 05/10/2016. Cycle # 14 FOLFOX+AVASTIN was on 05/24/2016. .5 on 05/24/2016. Cycle # 15 FOLFOX + Avastin was on 06/07/2016. CEA 90.5 on 06/07/2016. Admitted to [facility] 06/13/2016-06/16/2016 for abdominal pain: EGD noted 1.5 cm clean based duodenal bulb ulceration s/p epinephrine and bicap per Dr. César Nava. No active bleeding.    A. Stomach, biopsy: Mild chronic gastritis, immunostain negative for Helicobacter B. Esophagus, biopsy: Gastric glandular mucosa with prominent ntestinal metaplasia ([place]'s epithelium), negative for epithelial dysplasia, esophageal squamous mucosa not identified     Cycle # 16 FOLFOX (discontinued avastin (bevacizumab) given association of peptic ulcer disease and known association of GI perforation.) was on 06/21/2016. CEA 47 on 06/21/2016. Cycle # 17 FOLFOX was on 07/05/2016. CEA 52.6 on 07/05/2016. CEA 47.6 on 07/19/2016.     Re-staging scans 07/19/2106: CT Chest negative for metastatic disease. CT Abdomen/Pelvis: Stable hepatic lesions; Question new mural thickening in the cecum. Bone Scan: New Let hip lesion suspicious for bone metastasis.     Increased CEA; new mural thickening in the cecum and new left hip lesion suspicious for bone metastasis are consistent with disease progression; He derived maximum benefit from FOLFOX/AVASTIN. D/C FOLFOX/AVASTIN. We recommended FOLFIRI second line therapy. Cycle # 1 FOLFIRI was on 08/02/2016. CEA 40.8 on 08/02/2016. Xgeva q4 weeks started on 08/02/2016. Cycle # 2 FOLFIRI was on 08/16/2016. CEA 31.7 on 08/16/2016. Cycle # 3 FOLFIRI (added Avastin) was on 08/30/2016 given that ulcers healed on EGD 08/15/2016 by Dr. Radha Mata; Protonix bid since avastin re-started. Colonoscopy on 08/29/2016 (to look at the cecal area) unremarkable. CEA 26.7 on 08/30/2016. Cycle # 4 FOLFIRI/Avastin was on 09/13/2016. CEA 23.4 on 09/13/2016. Cycle # 5 FOLFIRI/Avastin was on 09/27/2016. CEA 22.3 on 09/27/2016. Cycle # 6 FOLFIRI/Avastin was on 10/11/2016. CEA 18.4 on 10/11/2016.      Bone scan 10/21/2016 stable bone metastasis; ? New lesion anterior left sixth rib likely interval healing fracture. CT abdomen/pelvis revealed stable hepatic metastasis. CT chest negative for metastatic disease. Continue FOLFIRI/Avastin and repeat scans in 3 months. Cycle # 7 FOLFIRI/Avastin was on 10/25/2016. CEA 16.1 on 10/25/2016. Cycle # 8 FOLFIRI/Avastin was on 11/08/2016. CEA 15.7 on 11/08/2016. Cycle # 9 FOLFIRI/Avastin was on 11/29/2016. CEA 13.6 on 11/29/2016. Cycle # 10 FOLFIRI/Avastin was on 12/13/2016. CEA 10.6 on 12/13/2016. Cycle # 11 FOLFIRI/Avastin was on 12/27/2016. CEA 11.1 on 12/27/2016. Cycle # 12 FOLFIRI/Avastin was on 01/10/2017. CEA 9.7 on 01/10/2017. CT chest 01/23/2017 No new pulmonary nodule or lymphadenopathy. Patchy groundglass opacities within the left lower lobe, suggestive of infectious or inflammatory etiology. Followup CT chest in 3 months. Slight increased linear sclerosis along the left anterior sixth rib corresponding to an area of increased uptake on the prior bone scan from 10/21/2016, favored to be related to interval healing changes of a nondisplaced fracture. CT abdomen/pelvis on 01/23/2017 Redemonstration of multiple hepatic metastases, which are similar compared to the prior CT from 10/21/2016. Redemonstration of area of increased sclerosis along the right acetabulum suspicious for metastatic disease. Bone scan on 01/23/2017 Stable subtle uptake within the left proximal diaphysis, again suggestive of metastatic disease  Decreased subtle uptake within the medial right acetabulum. Decreased uptake within the left anterior sixth rib corresponding to linear sclerosis on the recent CT, favored to be related to an joint rib fracture. Continue FOLFIRI + Avastin and repeat scans in 3 months. Cycle # 13 FOLFIRI + Avastin was on 01/24/2017. Cycle # 14 FOLFIRI + Avastin was on 02/07/2017. Cycle # 15 FOLFIRI + Avastin was on 02/21/2017. Cycle # 16 FOLFIRI + Avastin was on 03/07/2017. Cycle # 17 FOLFIRI + Avastin was on 03/21/2017. Cycle # 18 FOLFIRI + Avastin was on 04/04/2017. CT chest 04/17/2017 negative for metastatic disease. CT abdomen/pelvis 04/17/2017 Stable hypodense metastatic lesions within the liver.  Stable area of increased sclerosis along the right acetabulum Continue FOLFIRI + Avastin and repeat scans in 3 months. Cycle # 48 FOLFIRI + Avastin was on 06/06/2018. CEA 6.3 on 06/05/2018. Cycle # 49 FOLFIRI + Avastin was on 06/19/2018. CEA 6.1 on 06/19/2018. Cycle # 50 FOLFIRI + Avastin was on 07/03/2018. CEA 7.4 on 07/03/2018. Cycle # 51 FOLFIRI + Avastin was on 07/24/2018. CEA 6.7 on 07/24/2018. Cycle # 52 FOLFIRI + Avastin was on 08/14/2018. CEA 6.8 on 08/14/2018. Cycle # 53 FOLFIRI + Avastin was on 08/28/2018. CEA 6.5 on 08/27/2018. CT chest 09/06/2018 noted interval decrease in size of LUCY measuring up to 4 mm, suggestive of treatment response. No mediastinal or hilar LN  CT abdomen/pelvis 09/06/2018 Slight interval increase in size of a previously noted metastatic lesion within the right hepatic lobe. This may be related to differences in phase of enhancement compared to the most recent study from 5/31/2018, the lesion remains decreased in size compared to the more previous studies. No abdominal, retroperitoneal, or pelvic lymphadenopathy. Continue FOLFIRI + Avastin and repeat scans in 2 months. Cycle # 54 FOLFIRI + Avastin was on 09/11/2018. CEA 6.4 on 09/10/2018. Cycle # 55 FOLFIRI + Avastin was on 09/25/2018. CEA 6.4 on 09/25/2018. Cycle # 56 FOLFIRI + Avastin was on 10/09/2018. CEA 6.7 on 10/08/2018. Cycle # 57 FOLFIRI + Avastin was on 10/23/2018. CEA 7.2 on 10/22/2018. CT chest 11/02/2018 stable 3 mm nodule within LUCY. No new right and left lung nodule. No mediastinal or osseous lesion. CT abdomen/pelvis 11/02/2018 stable metastatic disease to liver. No new metastatic disease identified. No mesenteric or retroperitoneal LN. No gross colonic lesion identified. Continue FOLFIRI + Avastin and repeat scans in 3 months. Cycle # 58 FOLFIRI + Avastin was on 11/06/2018. CEA 7.6 on 11/05/2018. Cycle # 59 FOLFIRI + Avastin was on 11/27/2018. CEA 6.9 on 11/26/2018. Cycle # 60 FOLFIRI + Avastin was on 12/11/2018. CEA 6.7 on 12/11/2018. Cycle # 61 FOLFIRI + Avastin was on 01/08/2019. CEA 5.6 on 01/07/2019. Cycle # 62 FOLFIRI + Avastin was on 01/29/2019. CEA 4.6 on 01/28/2019. Cycle # 63 FOLFIRI + Avastin was on 02/12/2019. CEA 4.3 on 02/11/2019. CT chest 02/21/2019 no evidence of metastatic disease. CT abdomen/pelvis 02/21/2019 stable hypodense liver lesions. No evidence of worsening metastatic disease. 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 Ordered MRI thoracic spine and referred to neurosurgery team.  Cycle # 64 FOLFIRI + Avastin was on 02/26/2019. CEA 4.7 on 02/25/2019. Cycle # 65 FOLFIRI + Avastin was on 03/12/2019. CEA 4.0 on 03/11/2019. Cycle # 66 FOLFIRI + Avastin was on 03/26/2019. CEA 4.7 on 03/25/2019. Cycle # 67 FOLFIRI + Avastin was on 04/09/2019. CEA 5.6 on 04/08/2019. Cycle # 68 FOLFIRI + Avastin was on 04/30/2019. CEA 4.9 on 04/29/2019. Cycle # 69 FOLFIRI + Avastin was on 05/14/2019. CEA 5.3 on 05/14/2019. CT chest 05/23/2019 stable small lung nodule with no evidence of metastatic disease. CT abdomen/pelvis 05/23/2019 Decrease conspicuity of the liver lesions. No new lesions seen. Stable splenomegaly. Continue FOLFIRI + Avastin and repeat scans in 3 months. Cycle # 70 FOLFIRI + Avastin was on 06/04/2019. CEA 4.8 on 06/03/2019. Cycle # 71 FOLFIRI + Avastin was on 06/18/2019. CEA 4.6 on 06/17/2019. Cycle # 72 FOLFIRI + Avastin was on 07/09/2019. CEA 4.3 on 07/08/2019. Cycle # 73 FOLFIRI + Avastin was on 07/30/2019. CEA 5.0 on 07/29/2019. Cycle # 74 FOLFIRI + Avastin was on 08/13/2019. CEA 5.0 on 08/12/2019. CT chest 08/20/2019 negative  CT abdomen/pelvis 08/20/2019 Previous identified hepatic lesions are not visualized on the current exam.  Continue FOLFIRI + Avastin and repeat scans in 3 months. Cycle # 75 FOLFIRI + Avastin was on 08/27/2019. CEA 5.0 on 08/26/2019. Cycle # 76 FOLFIRI + Avastin was on 09/17/2019. CEA 5.5 on 09/16/2019. Cycle # 77 FOLFIRI + Avastin was on 10/15/2019. CEA 4.6 on 10/15/2019. Cycle # 78 FOLFIRI + Avastin was on 10/29/2019. CEA 4.1 on 10/28/2019. Cycle # 79 FOLFIRI + Avastin was on 11/12/2019. CEA 4.3 on 11/12/2019. Cycle # 80 FOLFIRI + Avastin was on 12/10/2019. CEA 4.0 on 12/09/2019. CT chest 12/19/2019 Stable 3 mm nodule within the left upper lobe, similar to the previous studies dating back to 11/2/2018, however decreased in size compared to the CT from 3/26/2018. No thoracic LN. CT abdomen/pelvis 12/19/2019 Previously described small hypodense lesions are more conspicuous on the current study compared to the recent study throughout the liver from 8/20/2019, however similar to the prior study from 2/21/2019. Findings may be related to differences in contrast opacification. No abdominal or pelvic lymphadenopathy. Continue FOLFIRI + Avastin and repeat scans in 2 months to f/u on liver lesions. Cycle # 81 FOLFIRI + Avastin was on 01/07/2020. CEA 3.8 on 01/06/2020. Cycle # 82 FOLFIRI + Avastin was on 01/21/2020. CEA 3.7 on 01/20/2020. Cycle # 83 FOLFIRI + Avastin was on 02/04/2020. CEA 4.1 on 02/03/2020. Cycle # 84 FOLFIRI + Avastin was on 02/18/2020. CEA 4.6 on 02/17/2020. CT chest 2/28/2020 no evidence of metastatic disease to the lungs and no mediastinal or hilar adenopathy. CT abdomen pelvis 2/28/2020 no enhancing lesions seen within the liver to suggest metastatic disease. Wall thickening of the rectosigmoid junction. Images reviewed. Continue FOLFIRI Avastin and repeat scans in 3 months  EGD by Dr. Noé [place] 03/02/2020 showing no masses or lesions. Cycle # 85 FOLFIRI + Avastin was on 03/03/2020. CEA 7.4 on 03/02/2020. Cycle # 86 FOLFIRI + Avastin was on 03/17/2020. CEA 4.5 on 03/16/2020. Colonoscopy on 03/23/2020 by Dr. Noé aguero (records requested). Cycle # 87 FOLFIRI + Avastin was on 03/31/2020. CEA 4.1 on 03/30/2020. Cycle # 88 FOLFIRI + Avastin was on 04/21/2020. CEA 6.4 on 04/20/2020. Cycle # 89 FOLFIRI + Avastin was on 05/05/2020. CEA 5.8 on 05/04/2020. Cycle # 90 FOLFIRI + Avastin was on 06/02/2020. CEA 5.5 on 06/01/2020. Cycle # 91 FOLFIRI + Avastin was on 06/16/2020. CEA 5.6 on 06/15/2020. CT chest 06/23/2020 noted no metastatic disease in the chest.   CT abdomen/pelvis 06/23/2020 Stable small liver lesions measuring up to 5 mm since 2019.   22 mm round mass in segment 8 of the liver with central high density stable from December 2019), previously measuring up to 34 mm in 2017. No additional metastatic disease in the abdomen or pelvis. Small amount of ascites. Overall stable disease. Continue FOLFIRI + Avastin and repeat scans in 2-3 months. Cycle # 92 FOLFIRI + Avastin was on 07/07/2020. CEA 5.7 on 07/06/2020. Cycle # 93 FOLFIRI + Avastin was on 07/21/2020. CEA 5.9 on 07/20/2020. Cycle # 94 FOLFIRI + Avastin was on 08/04/2020. CEA 5.5 on 08/04/2020. Cycle # 95 FOLFIRI + Avastin was on 08/25/2020. CEA 6.1 on 08/24/2020. CT chest 9/2/2020 stable unremarkable enhanced CT of the thorax. There is no metastatic disease to the lungs. CT abdomen pelvis on 9/2/2020 stable 2.2 cm low attenuated lesion within the central aspect of the right hepatic lobe favored to represent treated metastatic disease. No new hepatic lesion is identified. Stable splenomegaly  There is no appreciable colonic lesion or stricture. Pericholecystic fluid of uncertain etiology. Laparoscopic cholecystectomy with normal cholangiogram 10/27/20  Gallbladder: Chronic cholecystitis and cholelithiasis.  Unremarkable regional lymph node. 11/13/2020; Seen by Dr. Lorenzo Head from General surgery team; cleared to re-start chemotherapy. Cycle # 96 FOLFIRI + Avastin was on 11/17/2020. CEA 3.6 on 11/16/2020. Cycle # 97 FOLFIRI + Avastin was on 12/01/2020. CEA 3.5 on 11/30/2020. Cycle # 98 FOLFIRI + Avastin was on 12/15/2020. CEA 4.3 on 12/15/2020. Cycle # 99 FOLFIRI + Avastin is today 01/05/2021. CEA 4.7 on 01/04/2021. RTC 2 weeks for Cycle # 100 FOLFIRI + Avastin. Scans to be done in the interim.     MSI testing noted no mismatch repair protein loss of expression    1/5/2021  Mo Gómez MD  Board Certified Medical Oncologist

## 2021-01-06 ENCOUNTER — TELEPHONE (OUTPATIENT)
Dept: INFUSION THERAPY | Age: 71
End: 2021-01-06

## 2021-01-06 NOTE — TELEPHONE ENCOUNTER
Began prior authorization process through [facility]) for CT chest, CT abdomen/pelvis scans, spoke to Miss GOMEZ. Faxed orders and clinicals to:  [phone]. Once authorization is received, will schedule scans and contact patient with appointment information.

## 2021-01-07 ENCOUNTER — HOSPITAL ENCOUNTER (OUTPATIENT)
Dept: INFUSION THERAPY | Age: 71
Discharge: HOME | End: 2021-01-07
Payer: MEDICARE

## 2021-01-07 DIAGNOSIS — C20 RECTAL CANCER METASTASIZED TO LIVER (HCC): ICD-10-CM

## 2021-01-07 DIAGNOSIS — C78.7 RECTAL CANCER METASTASIZED TO LIVER (HCC): ICD-10-CM

## 2021-01-07 DIAGNOSIS — Z51.11 ENCOUNTER FOR ANTINEOPLASTIC CHEMOTHERAPY: Primary | ICD-10-CM

## 2021-01-07 PROCEDURE — 96372 THER/PROPH/DIAG INJ SC/IM: CPT

## 2021-01-07 PROCEDURE — 6360000002 HC RX W HCPCS: Performed by: INTERNAL MEDICINE

## 2021-01-07 RX ADMIN — PEGFILGRASTIM-CBQV 6 MG: 6 INJECTION, SOLUTION SUBCUTANEOUS at 14:35

## 2021-01-13 ENCOUNTER — HOSPITAL ENCOUNTER (OUTPATIENT)
Dept: CT IMAGING | Age: 71
Discharge: HOME | End: 2021-01-13
Payer: MEDICARE

## 2021-01-13 ENCOUNTER — APPOINTMENT (OUTPATIENT)
Dept: CT IMAGING | Age: 71
End: 2021-01-13
Payer: MEDICARE

## 2021-01-13 DIAGNOSIS — C20 RECTAL CANCER (HCC): ICD-10-CM

## 2021-01-13 PROCEDURE — 74177 CT ABD & PELVIS W/CONTRAST: CPT

## 2021-01-13 PROCEDURE — 6360000004 HC RX CONTRAST MEDICATION: Performed by: RADIOLOGY

## 2021-01-13 PROCEDURE — 71260 CT THORAX DX C+: CPT

## 2021-01-13 RX ADMIN — IOPAMIDOL 75 ML: 755 INJECTION, SOLUTION INTRAVENOUS at 16:05

## 2021-01-13 RX ADMIN — IOHEXOL 50 ML: 240 INJECTION, SOLUTION INTRATHECAL; INTRAVASCULAR; INTRAVENOUS; ORAL at 16:04

## 2021-01-18 ENCOUNTER — HOSPITAL ENCOUNTER (OUTPATIENT)
Age: 71
Discharge: HOME | End: 2021-01-18
Payer: MEDICARE

## 2021-01-18 LAB
ALBUMIN SERPL-MCNC: 3.7 G/DL (ref 3.5–5.2)
ALP BLD-CCNC: 179 U/L (ref 40–129)
ALT SERPL-CCNC: 15 U/L (ref 0–40)
ANION GAP SERPL CALCULATED.3IONS-SCNC: 10 MMOL/L (ref 7–16)
ANISOCYTOSIS: ABNORMAL
AST SERPL-CCNC: 22 U/L (ref 0–39)
BASOPHILS ABSOLUTE: 0.02 E9/L (ref 0–0.2)
BASOPHILS RELATIVE PERCENT: 0.7 % (ref 0–2)
BILIRUB SERPL-MCNC: 0.7 MG/DL (ref 0–1.2)
BUN BLDV-MCNC: 25 MG/DL (ref 8–23)
CALCIUM SERPL-MCNC: 9.6 MG/DL (ref 8.6–10.2)
CEA: 4.7 NG/ML (ref 0–5.2)
CHLORIDE BLD-SCNC: 107 MMOL/L (ref 98–107)
CO2: 24 MMOL/L (ref 22–29)
CREAT SERPL-MCNC: 1.2 MG/DL (ref 0.7–1.2)
EOSINOPHILS ABSOLUTE: 0.11 E9/L (ref 0.05–0.5)
EOSINOPHILS RELATIVE PERCENT: 3.8 % (ref 0–6)
GFR AFRICAN AMERICAN: >60
GFR NON-AFRICAN AMERICAN: 60 ML/MIN/1.73
GLUCOSE BLD-MCNC: 134 MG/DL (ref 74–99)
HCT VFR BLD CALC: 28.2 % (ref 37–54)
HEMOGLOBIN: 8.8 G/DL (ref 12.5–16.5)
HYPOCHROMIA: ABNORMAL
IMMATURE GRANULOCYTES #: 0.02 E9/L
IMMATURE GRANULOCYTES %: 0.7 % (ref 0–5)
LYMPHOCYTES ABSOLUTE: 0.5 E9/L (ref 1.5–4)
LYMPHOCYTES RELATIVE PERCENT: 17.5 % (ref 20–42)
MCH RBC QN AUTO: 30.4 PG (ref 26–35)
MCHC RBC AUTO-ENTMCNC: 31.2 % (ref 32–34.5)
MCV RBC AUTO: 97.6 FL (ref 80–99.9)
MONOCYTES ABSOLUTE: 0.13 E9/L (ref 0.1–0.95)
MONOCYTES RELATIVE PERCENT: 4.5 % (ref 2–12)
NEUTROPHILS ABSOLUTE: 2.08 E9/L (ref 1.8–7.3)
NEUTROPHILS RELATIVE PERCENT: 72.8 % (ref 43–80)
PDW BLD-RTO: 19.3 FL (ref 11.5–15)
PLATELET # BLD: 116 E9/L (ref 130–450)
PMV BLD AUTO: 12.4 FL (ref 7–12)
POLYCHROMASIA: ABNORMAL
POTASSIUM SERPL-SCNC: 4.2 MMOL/L (ref 3.5–5)
RBC # BLD: 2.89 E12/L (ref 3.8–5.8)
SODIUM BLD-SCNC: 141 MMOL/L (ref 132–146)
TOTAL PROTEIN: 6.7 G/DL (ref 6.4–8.3)
WBC # BLD: 2.9 E9/L (ref 4.5–11.5)

## 2021-01-18 PROCEDURE — 36415 COLL VENOUS BLD VENIPUNCTURE: CPT

## 2021-01-18 PROCEDURE — 85025 COMPLETE CBC W/AUTO DIFF WBC: CPT

## 2021-01-18 PROCEDURE — 80053 COMPREHEN METABOLIC PANEL: CPT

## 2021-01-18 PROCEDURE — 82378 CARCINOEMBRYONIC ANTIGEN: CPT

## 2021-01-19 ENCOUNTER — TELEPHONE (OUTPATIENT)
Dept: INFUSION THERAPY | Age: 71
End: 2021-01-19

## 2021-01-19 ENCOUNTER — OFFICE VISIT (OUTPATIENT)
Dept: ONCOLOGY | Age: 71
End: 2021-01-19
Payer: MEDICARE

## 2021-01-19 ENCOUNTER — HOSPITAL ENCOUNTER (OUTPATIENT)
Dept: INFUSION THERAPY | Age: 71
Discharge: HOME | End: 2021-01-19
Payer: MEDICARE

## 2021-01-19 VITALS
TEMPERATURE: 97.2 F | DIASTOLIC BLOOD PRESSURE: 65 MMHG | BODY MASS INDEX: 24.2 KG/M2 | OXYGEN SATURATION: 100 % | SYSTOLIC BLOOD PRESSURE: 124 MMHG | WEIGHT: 182.6 LBS | HEART RATE: 68 BPM | HEIGHT: 73 IN

## 2021-01-19 VITALS
HEART RATE: 56 BPM | RESPIRATION RATE: 18 BRPM | SYSTOLIC BLOOD PRESSURE: 124 MMHG | DIASTOLIC BLOOD PRESSURE: 63 MMHG | TEMPERATURE: 97.3 F

## 2021-01-19 DIAGNOSIS — C79.51 BONE METASTASIS (HCC): Primary | ICD-10-CM

## 2021-01-19 DIAGNOSIS — C78.7 RECTAL CANCER METASTASIZED TO LIVER (HCC): ICD-10-CM

## 2021-01-19 DIAGNOSIS — C20 RECTAL CANCER METASTASIZED TO LIVER (HCC): ICD-10-CM

## 2021-01-19 DIAGNOSIS — C20 RECTAL CANCER (HCC): Primary | ICD-10-CM

## 2021-01-19 PROCEDURE — 96368 THER/DIAG CONCURRENT INF: CPT

## 2021-01-19 PROCEDURE — 96417 CHEMO IV INFUS EACH ADDL SEQ: CPT

## 2021-01-19 PROCEDURE — 2580000003 HC RX 258: Performed by: INTERNAL MEDICINE

## 2021-01-19 PROCEDURE — 96375 TX/PRO/DX INJ NEW DRUG ADDON: CPT

## 2021-01-19 PROCEDURE — 6360000002 HC RX W HCPCS: Performed by: INTERNAL MEDICINE

## 2021-01-19 PROCEDURE — 96416 CHEMO PROLONG INFUSE W/PUMP: CPT

## 2021-01-19 PROCEDURE — 96372 THER/PROPH/DIAG INJ SC/IM: CPT

## 2021-01-19 PROCEDURE — 96415 CHEMO IV INFUSION ADDL HR: CPT

## 2021-01-19 PROCEDURE — 96411 CHEMO IV PUSH ADDL DRUG: CPT

## 2021-01-19 PROCEDURE — 96413 CHEMO IV INFUSION 1 HR: CPT

## 2021-01-19 PROCEDURE — 99214 OFFICE O/P EST MOD 30 MIN: CPT | Performed by: INTERNAL MEDICINE

## 2021-01-19 RX ORDER — SODIUM CHLORIDE 9 MG/ML
250 INJECTION, SOLUTION INTRAVENOUS CONTINUOUS
Status: DISCONTINUED | OUTPATIENT
Start: 2021-01-19 | End: 2021-01-20 | Stop reason: HOSPADM

## 2021-01-19 RX ORDER — ATROPINE SULFATE 0.4 MG/ML
0.4 AMPUL (ML) INJECTION ONCE
Status: CANCELLED | OUTPATIENT
Start: 2021-01-19 | End: 2021-01-19

## 2021-01-19 RX ORDER — HEPARIN SODIUM (PORCINE) LOCK FLUSH IV SOLN 100 UNIT/ML 100 UNIT/ML
500 SOLUTION INTRAVENOUS PRN
Status: CANCELLED | OUTPATIENT
Start: 2021-01-19

## 2021-01-19 RX ORDER — SODIUM CHLORIDE 0.9 % (FLUSH) 0.9 %
10 SYRINGE (ML) INJECTION PRN
Status: CANCELLED | OUTPATIENT
Start: 2021-01-19

## 2021-01-19 RX ORDER — DIPHENHYDRAMINE HYDROCHLORIDE 50 MG/ML
50 INJECTION INTRAMUSCULAR; INTRAVENOUS ONCE
Status: CANCELLED | OUTPATIENT
Start: 2021-01-19 | End: 2021-01-19

## 2021-01-19 RX ORDER — EPINEPHRINE 1 MG/ML
0.3 INJECTION, SOLUTION, CONCENTRATE INTRAVENOUS PRN
Status: CANCELLED | OUTPATIENT
Start: 2021-01-19

## 2021-01-19 RX ORDER — FLUOROURACIL 50 MG/ML
850 INJECTION, SOLUTION INTRAVENOUS ONCE
Status: COMPLETED | OUTPATIENT
Start: 2021-01-19 | End: 2021-01-19

## 2021-01-19 RX ORDER — HEPARIN SODIUM (PORCINE) LOCK FLUSH IV SOLN 100 UNIT/ML 100 UNIT/ML
500 SOLUTION INTRAVENOUS PRN
Status: DISCONTINUED | OUTPATIENT
Start: 2021-01-19 | End: 2021-01-20 | Stop reason: HOSPADM

## 2021-01-19 RX ORDER — PALONOSETRON HYDROCHLORIDE 0.05 MG/ML
0.25 INJECTION, SOLUTION INTRAVENOUS ONCE
Status: COMPLETED | OUTPATIENT
Start: 2021-01-19 | End: 2021-01-19

## 2021-01-19 RX ORDER — DEXAMETHASONE SODIUM PHOSPHATE 10 MG/ML
10 INJECTION INTRAMUSCULAR; INTRAVENOUS ONCE
Status: COMPLETED | OUTPATIENT
Start: 2021-01-19 | End: 2021-01-19

## 2021-01-19 RX ORDER — FLUOROURACIL 50 MG/ML
850 INJECTION, SOLUTION INTRAVENOUS ONCE
Status: CANCELLED | OUTPATIENT
Start: 2021-01-19

## 2021-01-19 RX ORDER — PALONOSETRON HYDROCHLORIDE 0.05 MG/ML
0.25 INJECTION, SOLUTION INTRAVENOUS ONCE
Status: CANCELLED | OUTPATIENT
Start: 2021-01-19

## 2021-01-19 RX ORDER — SODIUM CHLORIDE 0.9 % (FLUSH) 0.9 %
10 SYRINGE (ML) INJECTION PRN
Status: DISCONTINUED | OUTPATIENT
Start: 2021-01-19 | End: 2021-01-20 | Stop reason: HOSPADM

## 2021-01-19 RX ORDER — DEXAMETHASONE SODIUM PHOSPHATE 10 MG/ML
10 INJECTION, SOLUTION INTRAMUSCULAR; INTRAVENOUS ONCE
Status: CANCELLED | OUTPATIENT
Start: 2021-01-19

## 2021-01-19 RX ORDER — SODIUM CHLORIDE 9 MG/ML
INJECTION, SOLUTION INTRAVENOUS CONTINUOUS
Status: CANCELLED | OUTPATIENT
Start: 2021-01-19

## 2021-01-19 RX ORDER — ATROPINE SULFATE 0.4 MG/ML
0.4 AMPUL (ML) INJECTION ONCE
Status: COMPLETED | OUTPATIENT
Start: 2021-01-19 | End: 2021-01-19

## 2021-01-19 RX ORDER — 0.9 % SODIUM CHLORIDE 0.9 %
10 VIAL (ML) INJECTION ONCE
Status: CANCELLED | OUTPATIENT
Start: 2021-01-19 | End: 2021-01-19

## 2021-01-19 RX ORDER — METHYLPREDNISOLONE SODIUM SUCCINATE 125 MG/2ML
125 INJECTION, POWDER, LYOPHILIZED, FOR SOLUTION INTRAMUSCULAR; INTRAVENOUS ONCE
Status: CANCELLED | OUTPATIENT
Start: 2021-01-19 | End: 2021-01-19

## 2021-01-19 RX ORDER — SODIUM CHLORIDE 9 MG/ML
250 INJECTION, SOLUTION INTRAVENOUS CONTINUOUS
Status: CANCELLED | OUTPATIENT
Start: 2021-01-19 | End: 2021-02-06

## 2021-01-19 RX ADMIN — PALONOSETRON 0.25 MG: 0.25 INJECTION, SOLUTION INTRAVENOUS at 09:13

## 2021-01-19 RX ADMIN — DENOSUMAB 120 MG: 120 INJECTION SUBCUTANEOUS at 10:31

## 2021-01-19 RX ADMIN — FLUOROURACIL 850 MG: 50 INJECTION, SOLUTION INTRAVENOUS at 12:22

## 2021-01-19 RX ADMIN — LEUCOVORIN CALCIUM 850 MG: 10 INJECTION INTRAMUSCULAR; INTRAVENOUS at 10:25

## 2021-01-19 RX ADMIN — DEXAMETHASONE SODIUM PHOSPHATE 10 MG: 10 INJECTION INTRAMUSCULAR; INTRAVENOUS at 09:16

## 2021-01-19 RX ADMIN — SODIUM CHLORIDE 250 ML: 9 INJECTION, SOLUTION INTRAVENOUS at 09:04

## 2021-01-19 RX ADMIN — SODIUM CHLORIDE 400 MG: 9 INJECTION, SOLUTION INTRAVENOUS at 10:26

## 2021-01-19 RX ADMIN — ATROPINE SULFATE 0.4 MG: 0.4 INJECTION, SOLUTION INTRAMUSCULAR; INTRAVENOUS; SUBCUTANEOUS at 09:14

## 2021-01-19 RX ADMIN — BEVACIZUMAB 400 MG: 400 INJECTION, SOLUTION INTRAVENOUS at 09:39

## 2021-01-19 NOTE — TELEPHONE ENCOUNTER
Called Haroon [place], [phone], and verified that his pump will be shipped to [employer] today for his set up later this afternoon. Voiced understanding.

## 2021-01-19 NOTE — PROGRESS NOTES
[place] [id number]  Attending Clinic Note     Reason for Visit: Follow-up on a patient with Metastatic Rectal Cancer.     PCP: Janine Calderón MD     History of Present Illness:  70 y/o  male who was referred to see Dr. Irma Peters (GI team) for evaluation of bright red blood per rectum, mild anemia and change in bowel habits with diarrhea. CEA 2640 on 10/19/2015. AlcP 299 AST 57 ALT 75 on 10/19/2015. Colonoscopy in 2013 noted no significant polyps, colitis or lesions at that time. Denies any Family History of colorectal cancer or polyps.     Colonoscopy on 10/19/2015 revealed:  1. Ascending polyp, 8 mm, hot snare: Tubulovillous adenoma. 2. Transverse polyp, 1 cm, hot snare: Serrated polyp most consistent with sessile serrated adenoma. 3. Five splenic flexure polyps, three - 5 mm, 7 mm, 8 mm, hot snare and biopsy: Four segments of Tubular Adenoma  4. Descending polyp, 5 mm, biopsy: Tubular adenoma. 5. Sigmoid polyp, 4 mm biopsy, Serrated polyp most consistent with sessile serrated adenoma. 6. Two rectal polyps, 4 mm biopsy: Serrated polyp most consistent with hyperplastic polyp. 7. Rectosigmoid colon mass (large mass approximately 65% circumference of the lumen; Very friable, firm and hard): Tubulovillous adenoma with associated focal erosion and fibroplasia.      CT scan abdomen/pelvis on 10/26/2015:  1. Small nodules at lung bases likely represent metastatic colon   cancer. 2. Extensive likely metastatic colon cancer throughout the liver.      3. Mild mural thickening and luminal narrowing in the   terminal ileum, otherwise nonspecific.      Colonoscopy with snare removal rectal mass was performed by Dr. Will Ahn. Pathology proved:  Rectal polyp: Invasive adenocarcinoma involving villous adenoma and extending to the cauterized edge of excision. KRAS Mutation: Mutation detected.   BRAF Mutation: Mutation not detected. 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     CEA 3488. Bone scan on 11/10/2015 noted no metastatic disease. CT chest on 11/10/2015 revealed Multiple pulmonary nodules in the upper and lower lobes consistent with metastatic disease;   Hepatic metastasis also visualized. 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 Mediport was placed. Cycle # 1 of FOLFOX + Avastin was on 11/23/2015. CEA was 4555 on 11/23/2015. Cycle # 2 of FOLFOX + Avastin was on 12/08/2015. CEA was 3160 on 12/08/2015. Cycle # 3 of FOLFOX + Avastin was on 12/22/2015. CEA was 2516 on 12/21/2015. Cycle # 4 of FOLFOX + Avastin was on 01/05/2016. CEA was 2098 on 01/05/2015. Cycle # 5 of FOLFOX + Avastin was on 01/19/2016. CEA was 1511 on 01/05/2015.     -Bone scan on 01/26/2016 noted no metastatic disease. CT chest on 01/26/2016 revealed Significant response to treatment with no visible residual nodules. CT scan abdomen/pelvis on 01/26/2016 noted Interval decreased size of multiple masses in the liver compatible with treatment response. Continue another 2 months of FOLFOX + Avastin and repeat scans. Cycle # 6 of FOLFOX + Avastin was on 02/02/2016.  on 02/02/2016. Cycle # 7 of FOLFOX + Avastin was on 02/16/2016.  on 02/16/2016. Cycle # 8 of FOLFOX + Avastin was on 03/01/2016.  on 03/01/2016. Cycle # 9 of FOLFOX + Avastin was on 03/15/2016.  on 03/15/2016. Cycle # 10 of FOLFOX + Avastin was on 03/29/2016. .4 on 03/29/2016. Cycle # 11 of FOLFOX + Avastin was on 04/12/2016. .4 on 04/12/2016.     Re-staging scans on 04/19/2016: CT Chest: clear lungs; no evidence of recurring pulmonary nodule; CT Abdomen/Pelvis: Further interval decrease in size of the multiple metastatic hepatic lesions, the largest lesion now measures 3.9 x 3.5 cm and previously measured 4.5 cm in maximum diameter.  No mesenteric lymphadenopathy is identified; Bone Scan: No evidence of osseous metastasis. Continue same regimen and re-stage in 2-3 months. Cycle # 12 FOLFOX + Avastin was on 04/26/2016. .5 on 04/26/2016. Cycle # 13 FOLFOX + Avastin was on 05/10/2016. .3 on 05/10/2016. Cycle # 14 FOLFOX+AVASTIN was on 05/24/2016. .5 on 05/24/2016. Cycle # 15 FOLFOX + Avastin was on 06/07/2016. CEA 90.5 on 06/07/2016. Admitted to [facility] 06/13/2016-06/16/2016 for abdominal pain: EGD noted 1.5 cm clean based duodenal bulb ulceration s/p epinephrine and bicap per Dr. Flakita Quach. No active bleeding. A. Stomach, biopsy: Mild chronic gastritis, immunostain negative for Helicobacter  B. Esophagus, biopsy: Gastric glandular mucosa with prominent intestinal metaplasia ([place]'s epithelium), negative for epithelial dysplasia, esophageal squamous mucosa not identified  Cycle # 16 FOLFOX (discontinued avastin (bevacizumab) given association of peptic ulcer disease and known association of GI perforation) was on 06/21/2016. Cycle # 17 FOLFOX was on 07/05/2016. CEA 52.6 on 07/19/2016. Cycle # 17 FOLFOX was on 07/05/2016. CEA 52.6 on 07/05/2016. CEA 47.6 on 07/19/2016.     Re-staging scans 07/19/2106: CT Chest negative for metastatic disease. CT Abdomen/Pelvis: Stable hepatic lesions; Question new mural thickening in the cecum. Bone Scan: New Let hip lesion suspicious for bone metastasis.     Increased CEA; new mural thickening in the cecum and new left hip lesion suspicious for bone metastasis are consistent with disease progression; He derived maximum benefit from FOLFOX/AVASTIN. D/C FOLFOX/AVASTIN. We recommended FOLFIRI second line therapy. Cycle # 1 FOLFIRI was on 08/02/2016. CEA 40.8 on 08/02/2016. Xgeva q4 weeks started on 08/02/2016. Cycle # 2 FOLFIRI was on 08/16/2016. CEA 31.7 on 08/16/2016. Cycle # 3 FOLFIRI (added Avastin) was on 08/30/2016 given that ulcers healed on EGD 08/15/2016 by Dr. Davina Landrum; Protonix bid since avastin re-started.    Colonoscopy on 08/29/2016 (to look at the cecal area) unremarkable. CEA 26.7 on 08/30/2016. Cycle # 4 FOLFIRI/Avastin was on 09/13/2016. CEA 23.4 on 09/13/2016. Cycle # 5 FOLFIRI/Avastin was on 09/27/2016. CEA 22.3 on 09/27/2016. Cycle # 6 FOLFIRI/Avastin was on 10/11/2016. CEA 18.4 on 10/11/2016.      Bone scan 10/21/2016 stable bone metastasis; ? New lesion anterior left sixth rib likely interval healing fracture. CT abdomen/pelvis revealed stable hepatic metastasis. CT chest negative for metastatic disease. Continue FOLFIRI/Avastin and repeat scans in 3 months. Cycle # 7 FOLFIRI/Avastin was on 10/25/2016. CEA 16.1  Cycle # 8 FOLFIRI/Avastin was on 11/08/2016. CEA 15.7  Cycle # 9 FOLFIRI/Avastin was on 11/29/2016. CEA 13.6 on 11/29/2016. Cycle # 10 FOLFIRI/Avastin was on 12/13/2016. CEA 10.6 on 12/13/2016. Cycle # 11 FOLFIRI/Avastin was on 12/27/2016. CEA 11.1 on 12/27/2016. Cycle # 12 FOLFIRI/Avastin was on 01/10/2017. CEA 9.7 on 01/10/2017.       CT chest 01/23/2017 No new pulmonary nodule or lymphadenopathy. Patchy groundglass opacities within the left lower lobe, suggestive of infectious or inflammatory etiology. Followup CT chest in 3 months. Slight increased linear sclerosis along the left anterior sixth rib corresponding to an area of increased uptake on the prior bone scan from 10/21/2016, favored to be related to interval healing changes of a nondisplaced fracture. CT abdomen/pelvis on 01/23/2017 Redemonstration of multiple hepatic metastases, which are similar compared to the prior CT from 10/21/2016. Redemonstration of area of increased sclerosis along the right acetabulum suspicious for metastatic disease. Bone scan on 01/23/2017 Stable subtle uptake within the left proximal diaphysis, again suggestive of metastatic disease  Decreased subtle uptake within the medial right acetabulum.    Decreased uptake within the left anterior sixth rib corresponding to linear sclerosis on the recent CT, favored to be related to an joint rib fracture. Continue FOLFIRI + Avastin and repeat scans in 3 months. Cycle # 13 FOLFIRI + Avastin was on 01/24/2017. Cycle # 14 FOLFIRI + Avastin was on 02/07/2017. Cycle # 15 FOLFIRI + Avastin was on 02/21/2017. Cycle # 16 FOLFIRI + Avastin was on 03/07/2017. Cycle # 17 FOLFIRI + Avastin was on 03/21/2017. Cycle # 18 FOLFIRI + Avastin was on 04/04/2017. CT chest 04/17/2017 negative for metastatic disease. CT abdomen/pelvis 04/17/2017 Stable hypodense metastatic lesions within the liver. Stable area of increased sclerosis along the right acetabulum  Bone scan 04/17/2017 Stable subtle uptake within the left proximal femoral diaphysis; No definite abnormal uptake in the region of a sclerotic lesion along the posterior column of the right acetabulum noted on CT. Stable slight uptake within the left anterior sixth rib corresponding to linear sclerosis on the recent CT, favored to be related to a fracture. Continue FOLFIRI + Avastin and repeat scans in 3 months. Cycle # 19 FOLFIRI + Avastin was on 04/18/2017. Cycle # 20 FOLFIRI + Avastin was on 05/02/2017. Cycle # 21 FOLFIRI + Avastin was on 05/16/2017. Cycle # 22 FOLFIRI + Avastin was on 05/30/2017. Cycle # 23 FOLFIRI + Avastin was on 06/13/2017. Cycle # 24 FOLFIRI + Avastin was on 06/27/2017. Cycle # 25 FOLFIRI + Avastin was on 07/11/2017. Cycle # 26 FOLFIRI + Avastin was on 07/25/2017. Cycle # 27 FOLFIRI + Avastin was on 08/08/2017. Cycle # 28 FOLFIRI + Avastin was on 08/22/2017. Cycle # 29 FOLFIRI + Avastin was on 09/05/2017. Cycle # 30 FOLFIRI + Avastin was on 09/19/2017. Bone scan 09/26/2017 stable. CT abdomen/pelvis on 09/26/2017 Stable hypodense mass in the liver. Stable sclerotic lesion in the acetabulum. CT chest 09/26/2017 negative for metastatic disease. Cycle # 31 FOLFIRI + Avastin was on 10/03/2017. CEA 7.4 on 10/03/2017. Cycle # 32 FOLFIRI + Avastin was on 10/17/2017.  CEA 6.0 on Cycle # 50 FOLFIRI + Avastin was on 07/03/2018. Cycle # 51 FOLFIRI + Avastin was on 07/24/2018. Cycle # 52 FOLFIRI + Avastin was on 08/14/2018. Cycle # 53 FOLFIRI + Avastin was on 08/28/2018. CT chest 09/06/2018 noted interval decrease in size of LUCY measuring up to 4 mm, suggestive of treatment response. No mediastinal or hilar LN  CT abdomen/pelvis 09/06/2018 Slight interval increase in size of a previously noted metastatic lesion within the right hepatic lobe. This may be related to differences in phase of enhancement compared to the most recent study from 5/31/2018, the lesion remains decreased in size compared to the more previous studies. No abdominal, retroperitoneal, or pelvic lymphadenopathy. Continue FOLFIRI + Avastin and repeat scans in 2 months. Cycle # 54 FOLFIRI + Avastin was on 09/11/2018. Cycle # 55 FOLFIRI + Avastin was on 09/25/2018. Cycle # 56 FOLFIRI + Avastin was on 10/09/2018. Cycle # 57 FOLFIRI + Avastin was on 10/23/2018. CT chest 11/02/2018 stable 3 mm nodule within LUCY. No new right and left lung nodule. No mediastinal or osseous lesion. CT abdomen/pelvis 11/02/2018 stable metastatic disease to liver. No new metastatic disease identified. No mesenteric or retroperitoneal LN. No gross colonic lesion identified. Continue FOLFIRI + Avastin and repeat scans in 3 months. Cycle # 58 FOLFIRI + Avastin was on 11/06/2018. CEA 7.6 on 11/05/2018. Cycle # 59 FOLFIRI + Avastin was on 11/27/2018. CEA 6.9 on 11/26/2018. Cycle # 60 FOLFIRI + Avastin was on 12/11/2018. CEA 6.7 on 12/11/2018. Cycle # 61 FOLFIRI + Avastin was on 01/08/2019. CEA 5.6 on 01/07/2019. Cycle # 62 FOLFIRI + Avastin was on 01/29/2019. CEA 4.6 on 01/28/2019. Cycle # 63 FOLFIRI + Avastin was on 02/12/2019. CEA 4.3 on 02/11/2019. CT chest 02/21/2019 no evidence of metastatic disease. CT abdomen/pelvis 02/21/2019 stable hypodense liver lesions. No evidence of worsening metastatic disease.  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 Ordered MRI thoracic spine and referred to neurosurgery team.    Cycle # 64 FOLFIRI + Avastin was on 02/26/2019. CEA 4.7 on 02/25/2019. Cycle # 65 FOLFIRI + Avastin was on 03/12/2019. CEA 4.0 on 03/11/2019. Cycle # 66 FOLFIRI + Avastin was on 03/26/2019. CEA 4.7 on 03/25/2019. Cycle # 67 FOLFIRI + Avastin was on 04/09/2019. CEA 5.6 on 04/08/2019. Cycle # 68 FOLFIRI + Avastin was on 04/30/2019. CEA 4.9 on 04/29/2019. Cycle # 69 FOLFIRI + Avastin was on 05/14/2019. CEA 5.3 on 05/14/2019. CT chest 05/23/2019 stable small lung nodule with no evidence of metastatic disease. CT abdomen/pelvis 05/23/2019 Decrease conspicuity of the liver lesions. No new lesions seen. Stable splenomegaly. Continue FOLFIRI + Avastin and repeat scans in 3 months. Cycle # 70 FOLFIRI + Avastin was on 06/04/2019. CEA 4.8 on 06/03/2019. Cycle # 71 FOLFIRI + Avastin was on 06/18/2019. CEA 4.6 on 06/17/2019. Cycle # 72 FOLFIRI + Avastin was on 07/09/2019. CEA 4.3 on 07/08/2019. Cycle # 73 FOLFIRI + Avastin was on 07/30/2019. CEA 5.0 on 07/29/2019. Cycle # 74 FOLFIRI + Avastin was on 08/13/2019. CEA 5.0 on 08/12/2019. CT chest 08/20/2019 negative  CT abdomen/pelvis 08/20/2019 Previous identified hepatic lesions are not visualized on the current exam.  Continue FOLFIRI + Avastin and repeat scans in 3 months. Cycle # 75 FOLFIRI + Avastin was on 08/27/2019. CEA 5.0 on 08/26/2019. Cycle # 76 FOLFIRI + Avastin was on 09/17/2019. CEA 5.5 on 09/16/2019. Cycle # 77 FOLFIRI + Avastin was on 10/15/2019. CEA 4.6 on 10/15/2019. Cycle # 78 FOLFIRI + Avastin was on 10/29/2019. CEA 4.1 on 10/28/2019. Cycle # 79 FOLFIRI + Avastin was on 11/12/2019. CEA 4.3 on 11/12/2019. Cycle # 80 FOLFIRI + Avastin was on 12/10/2019. CEA 4.0 on 12/09/2019.   CT chest 12/19/2019 Stable 3 mm nodule within the left upper lobe, similar to the previous studies dating back to 11/2/2018, however decreased in size compared to the CT from 3/26/2018. No thoracic LN. CT abdomen/pelvis 12/19/2019 Previously described small hypodense lesions are more conspicuous on the current study compared to the recent study throughout the liver from 8/20/2019, however similar to the prior study from 2/21/2019. Findings may be related to differences in contrast opacification. No abdominal or pelvic lymphadenopathy. Continue FOLFIRI + Avastin and repeat scans in 2 months to f/u on liver lesions. Cycle # 81 FOLFIRI + Avastin was on 01/07/2020. CEA 3.8 on 01/06/2020. Cycle # 82 FOLFIRI + Avastin was on 01/21/2020. CEA 3.7 on 01/20/2020. Cycle # 83 FOLFIRI + Avastin was on 02/04/2020. CEA 4.1 on 02/03/2020. Cycle # 84 FOLFIRI + Avastin was on 02/18/2020. CEA 4.6 on 02/17/2020. EGD by Dr. Brian Delacruz 03/02/2020 showing no masses or lesions  Cycle # 85 FOLFIRI + Avastin was on 03/03/2020. CEA 7.4 on 03/02/2020. Cycle # 86 FOLFIRI + Avastin was on 03/17/2020. CEA 4.5 on 03/16/2020. Colonoscopy on 03/23/2020 by Dr. Brian aguero (records requested). Cycle # 87 FOLFIRI + Avastin was on 03/31/2020. CEA 4.1 on 03/30/2020. Cycle # 88 FOLFIRI + Avastin was on 04/21/2020. CEA 6.4 on 04/20/2020. Cycle # 89 FOLFIRI + Avastin was on 05/05/2020. CEA 5.8 on 05/04/2020. Cycle # 90 FOLFIRI + Avastin was on 06/02/2020. CEA 5.5 on 06/01/2020. Cycle # 91 FOLFIRI + Avastin was on 06/16/2020. CEA 5.6 on 06/15/2020. CT chest 06/23/2020 noted no metastatic disease in the chest.   CT abdomen/pelvis 06/23/2020 Stable small liver lesions measuring up to 5 mm since 2019.   22 mm round mass in segment 8 of the liver with central high density stable from December 2019), previously measuring up to 34 mm in 2017. No additional metastatic disease in the abdomen or pelvis. Small amount of ascites. Overall stable disease. Continue FOLFIRI + Avastin and repeat scans in 2-3 months. Cycle # 92 FOLFIRI + Avastin was on 07/07/2020.  CEA 5.7 on 07/06/2020. Cycle # 93 FOLFIRI + Avastin was on 07/21/2020. CEA 5.9 on 07/20/2020. Cycle # 94 FOLFIRI + Avastin was on 08/04/2020. CEA 5.5 on 08/04/2020. Cycle # 95 FOLFIRI + Avastin was on 08/25/2020. CEA 6.1 on 08/24/2020. CT chest 9/2/2020 stable unremarkable enhanced CT of the thorax. There is no metastatic disease to the lungs. CT abdomen pelvis on 9/2/2020 stable 2.2 cm low attenuated lesion within the central aspect of the right hepatic lobe favored to represent treated metastatic disease. No new hepatic lesion is identified. Stable splenomegaly  There is no appreciable colonic lesion or stricture  Pericholecystic fluid of uncertain etiology. General surgery team consulted. Laparoscopic cholecystectomy with normal cholangiogram 10/27/20  Gallbladder: Chronic cholecystitis and cholelithiasis.  Unremarkable regional lymph node. 11/13/2020; Seen by Dr. Rohan Rodriguez from General surgery team; cleared to re-start chemotherapy. Cycle # 96 FOLFIRI + Avastin was on 11/17/2020. CEA 3.6 on 11/16/2020. Cycle # 97 FOLFIRI + Avastin was on 12/01/2020. CEA 3.5 on 11/30/2020. Cycle # 98 FOLFIRI + Avastin was on 12/15/2020. CEA 4.3 on 12/15/2020. Cycle # 99 FOLFIRI + Avastin was on 01/05/2021. CEA 4.7 on 01/04/2021. CT chest 01/13/2021 negative for metastatic disease. CT abdomen/pelvis 01/13/2021 Unchanged central hepatic lesion. No specific signs of abdominopelvic metastatic disease. Continue FOLFIRI + Avastin and repeat scans in 3 months. Cycle # 100 FOLFIRI + Avastin is today 01/19/2021. Today 01/19/2021. No fever, chills. No chest pain or SOB. No nausea or vomiting. No diarrhea. Review of Systems;  CONSTITUTIONAL: No fever, chills. Fair appetite and energy level. ENMT: Eyes: No diplopia; Nose: No epistaxis. Mouth:No lesions  RESPIRATORY: No hemoptysis, shortness of breath. CARDIOVASCULAR: No chest pain, palpitations. GASTROINTESTINAL: No nausea/vomiting, abdominal pain. Mutation: Mutation detected. BRAF Mutation: Mutation not detected. 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 CT scan abdomen/pelvis on 10/26/2015 revealed small nodules at the lung bases, extensive liver lesions consistent with metastatic rectosigmoid cancer. CEA 3488 on 10/30/2015. Bone scan on 11/10/2015 noted no metastatic disease. CT chest on 11/10/2015 revealed Multiple pulmonary nodules in the upper and lower lobes consistent with metastatic disease; Hepatic metastasis also visualized. 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 Mediport was placed. Cycle # 1 of FOLFOX + Avastin was on 11/23/2015. CEA was 4555 on 11/23/2015. Cycle # 2 of FOLFOX + Avastin was on 12/08/2015. CEA was 3160 on 12/08/2015. Cycle # 3 of FOLFOX + Avastin was on 12/22/2015. CEA was 2516 on 12/21/2015. Cycle # 4 of FOLFOX + Avastin was on 01/05/2016. CEA was 2098 on 01/05/2015. Cycle # 5 of FOLFOX + Avastin was on 01/19/2016. CEA was 1511 on 01/05/2015.     -Bone scan on 01/26/2016 noted no metastatic disease. CT chest on 01/26/2016 revealed Significant response to treatment with no visible residual nodules. CT scan abdomen/pelvis on 01/26/2016 noted Interval decreased size of multiple masses in the liver compatible with treatment response. Continue another 2 months of FOLFOX + Avastin and repeat scans. Cycle # 6 of FOLFOX + Avastin was on 02/02/2016.  on 02/02/2016. Cycle # 7 of FOLFOX + Avastin was on 02/16/2016.  on 02/16/2016. Cycle # 8 of FOLFOX + Avastin was on 03/01/2016.  on 03/01/2016. Cycle # 9 of FOLFOX + Avastin was on 03/15/2016.  on 03/15/2016. Cycle # 10 of FOLFOX + Avastin was on 03/29/2016. .4 on 03/29/2016. Cycle # 11 of FOLFOX + Avastin was on 04/12/2016.  .4 on 04/12/2016.     Re-staging scans on 04/19/2016: CT Chest: clear lungs; no evidence of recurring pulmonary nodule; CT Abdomen/Pelvis: Further interval decrease in size of the multiple metastatic hepatic lesions, the largest lesion now measures 3.9 x 3.5 cm and previously measured 4.5 cm in maximum diameter. No mesenteric lymphadenopathy is identified; Bone Scan: No evidence of osseous metastasis. Continue same regimen and re-stage in 2-3 months. Cycle # 12 FOLFOX + Avastin was on 04/26/2016. .5 on 04/26/2016. Cycle # 13 FOLFOX + Avastin was on 05/10/2016. .3 on 05/10/2016. Cycle # 14 FOLFOX+AVASTIN was on 05/24/2016. .5 on 05/24/2016. Cycle # 15 FOLFOX + Avastin was on 06/07/2016. CEA 90.5 on 06/07/2016. Admitted to [facility] 06/13/2016-06/16/2016 for abdominal pain: EGD noted 1.5 cm clean based duodenal bulb ulceration s/p epinephrine and bicap per Dr. Doreen Vallejo. No active bleeding. A. Stomach, biopsy: Mild chronic gastritis, immunostain negative for Helicobacter  B. Esophagus, biopsy: Gastric glandular mucosa with prominent ntestinal metaplasia ([place]'s epithelium), negative for epithelial dysplasia, esophageal squamous mucosa not identified     Cycle # 16 FOLFOX (discontinued avastin (bevacizumab) given association of peptic ulcer disease and known association of GI perforation.) was on 06/21/2016. CEA 47 on 06/21/2016. Cycle # 17 FOLFOX was on 07/05/2016. CEA 52.6 on 07/05/2016. CEA 47.6 on 07/19/2016.     Re-staging scans 07/19/2106: CT Chest negative for metastatic disease. CT Abdomen/Pelvis: Stable hepatic lesions; Question new mural thickening in the cecum. Bone Scan: New Let hip lesion suspicious for bone metastasis.     Increased CEA; new mural thickening in the cecum and new left hip lesion suspicious for bone metastasis are consistent with disease progression; He derived maximum benefit from FOLFOX/AVASTIN. D/C FOLFOX/AVASTIN. We recommended FOLFIRI second line therapy. Cycle # 1 FOLFIRI was on 08/02/2016. CEA 40.8 on 08/02/2016. Xgeva q4 weeks started on 08/02/2016. Cycle # 2 FOLFIRI was on 08/16/2016.  CEA 31.7 on 08/16/2016. Cycle # 3 FOLFIRI (added Avastin) was on 08/30/2016 given that ulcers healed on EGD 08/15/2016 by Dr. Radha Mata; Protonix bid since avastin re-started. Colonoscopy on 08/29/2016 (to look at the cecal area) unremarkable. CEA 26.7 on 08/30/2016. Cycle # 4 FOLFIRI/Avastin was on 09/13/2016. CEA 23.4 on 09/13/2016. Cycle # 5 FOLFIRI/Avastin was on 09/27/2016. CEA 22.3 on 09/27/2016. Cycle # 6 FOLFIRI/Avastin was on 10/11/2016. CEA 18.4 on 10/11/2016.      Bone scan 10/21/2016 stable bone metastasis; ? New lesion anterior left sixth rib likely interval healing fracture. CT abdomen/pelvis revealed stable hepatic metastasis. CT chest negative for metastatic disease. Continue FOLFIRI/Avastin and repeat scans in 3 months. Cycle # 7 FOLFIRI/Avastin was on 10/25/2016. CEA 16.1 on 10/25/2016. Cycle # 8 FOLFIRI/Avastin was on 11/08/2016. CEA 15.7 on 11/08/2016. Cycle # 9 FOLFIRI/Avastin was on 11/29/2016. CEA 13.6 on 11/29/2016. Cycle # 10 FOLFIRI/Avastin was on 12/13/2016. CEA 10.6 on 12/13/2016. Cycle # 11 FOLFIRI/Avastin was on 12/27/2016. CEA 11.1 on 12/27/2016. Cycle # 12 FOLFIRI/Avastin was on 01/10/2017. CEA 9.7 on 01/10/2017. CT chest 01/23/2017 No new pulmonary nodule or lymphadenopathy. Patchy groundglass opacities within the left lower lobe, suggestive of infectious or inflammatory etiology. Followup CT chest in 3 months. Slight increased linear sclerosis along the left anterior sixth rib corresponding to an area of increased uptake on the prior bone scan from 10/21/2016, favored to be related to interval healing changes of a nondisplaced fracture. CT abdomen/pelvis on 01/23/2017 Redemonstration of multiple hepatic metastases, which are similar compared to the prior CT from 10/21/2016. Redemonstration of area of increased sclerosis along the right acetabulum suspicious for metastatic disease.    Bone scan on 01/23/2017 Stable subtle uptake within the left proximal diaphysis, again suggestive of metastatic disease  Decreased subtle uptake within the medial right acetabulum. Decreased uptake within the left anterior sixth rib corresponding to linear sclerosis on the recent CT, favored to be related to an joint rib fracture. Continue FOLFIRI + Avastin and repeat scans in 3 months. Cycle # 13 FOLFIRI + Avastin was on 01/24/2017. Cycle # 14 FOLFIRI + Avastin was on 02/07/2017. Cycle # 15 FOLFIRI + Avastin was on 02/21/2017. Cycle # 16 FOLFIRI + Avastin was on 03/07/2017. Cycle # 17 FOLFIRI + Avastin was on 03/21/2017. Cycle # 18 FOLFIRI + Avastin was on 04/04/2017. CT chest 04/17/2017 negative for metastatic disease. CT abdomen/pelvis 04/17/2017 Stable hypodense metastatic lesions within the liver. Stable area of increased sclerosis along the right acetabulum  Bone scan 04/17/2017 Stable subtle uptake within the left proximal femoral diaphysis; No definite abnormal uptake in the region of a sclerotic lesion along the posterior column of the right acetabulum noted on CT. Stable slight uptake within the left anterior sixth rib corresponding to linear sclerosis on the recent CT, favored to be related to a fracture. Continue FOLFIRI + Avastin and repeat scans in 3 months. Cycle # 19 FOLFIRI + Avastin was on 04/18/2017. CEA 7.5 on 04/18/2017. Cycle # 20 FOLFIRI + Avastin was on 05/02/2017. CEA 7.7 on 05/02/2017. Cycle # 21 FOLFIRI + Avastin was on 05/16/2017. CEA 7.1 on 05/16/2017. Cycle # 22 FOLFIRI + Avastin was on 05/30/2017. CEA 8.2 on 05/30/2017. Cycle # 23 FOLFIRI + Avastin was on 06/13/2017. CEA 7.7 on 06/13/2017. Cycle # 24 FOLFIRI + Avastin was on 06/27/2017. CEA 6.6 on 06/27/2017.    Re-staging scans 07/05/2017:  Bone scan stable proximal left femoral diaphysis, right acetabulum, and left anterior sixth rib lesions;  CT abdomen/pelvis stable hypodense metastatic lesions within the liver; CT chest without convincing evidence of metastatic disease. Cycle # 25 FOLFIRI + Avastin was on 07/11/2017. CEA 6.3 on 07/11/2017. Cycle # 26 FOLFIRI + Avastin was on 07/25/2017. CEA 7.3 on 07/25/2017. Cycle # 27 FOLFIRI + Avastin was on 08/08/2017. CEA 6.5 on 08/08/2017. Cycle # 28 FOLFIRI + Avastin was on 08/22/2017. CEA 5.9 on 08/22/2017. Cycle # 29 FOLFIRI + Avastin was on 09/05/2017. CEA 6.3 on 09/05/2017. Cycle # 30 FOLFIRI + Avastin was on 09/19/2017. CEA 6.3 on 09/19/2017. Bone scan 09/26/2017 stable. CT abdomen/pelvis on 09/26/2017 Stable hypodense mass in the liver. Stable sclerotic lesion in the acetabulum. CT chest 09/26/2017 negative for metastatic disease. Cycle # 31 FOLFIRI + Avastin was on 10/03/2017. CEA 7.4 on 10/03/2017. Cycle # 32 FOLFIRI + Avastin was on 10/17/2017. CEA 6.0 on 10/17/2017. Cycle # 33 FOLFIRI + Avastin was on 10/31/2017. CEA 6.8 on 10/31/2017. Cycle # 34 FOLFIRI + Avastin was on 11/14/2017. CEA 7.2 on 11/14/2017. Cycle # 35 FOLFIRI + Avastin was on 11/28/2017. CEA 5.1 on 11/28/2017. Cycle # 36 FOLFIRI + Avastin was on 12/12/2017. CEA 6.1 on 12/12/2017. Bone scan 12/22/2017 noted no evidence of metastatic disease to the axial or appendicular skeleton. CT chest 12/22/2017 noted no evidence of metastatic disease. CT abdomen/pelvis 12/22/2017 noted stable hypodense lesions in the liver. Continue FOLFIRI + Avastin and repeat scans in 3 months. Cycle # 37 FOLFIRI + Avastin was on 12/27/2018. CEA 6.7 on 12/27/2017. Cycle # 38 FOLFIRI + Avastin was on 01/09/2018. CEA 5.0 on 01/09/2018. Cycle # 39 FOLFIRI + Avastin was on 01/23/2018. CEA 6.5 on 01/23/2018. Cycle # 40 FOLFIRI + Avastin was on 02/06/2018. CEA 6.9 on 02/06/2018. Cycle # 41 FOLFIRI + Avastin was on 02/20/2018. CEA 6.4 on 02/20/2018. Cycle # 42 FOLFIRI + Avastin was on 03/06/2018. CEA 6.6 on 03/06/2018. Cycle # 43 FOLFIRI + Avastin was on 03/20/2018. CEA 5.7 on 03/20/2018. Bone scan on 03/26/2018 negative for metastatic disease.   CT chest 03/26/2018 noted ? new 7 mm nodule in LUCY. CT abdomen/pelvis 03/26/2018 noted stable hypodense lesions in the liver. ? New small ascites in the abdomen. Patient wants to continue to monitor the lung finding and repeat scans in 2 months instead of changing the current chemotherapy regimen to oral Lonsurf. Cycle # 44 FOLFIRI + Avastin was on 04/03/2018. CEA 6.3 on 04/03/2018. Cycle # 45 FOLFIRI + Avastin was on 04/24/2018. CEA 5.3 on 04/24/2018. Cycle # 46 FOLFIRI + Avastin was on 05/08/2018. CEA 5.4 on 05/08/2018. Cycle # 47 FOLFIRI + Avastin was on 05/22/2018. CEA 6.1 on 05/22/2018. CT chest 05/31/2018 noted stable nodule in LUCY. No evidence of worsening malignancy. CT abdomen/pelvis on 05/31/2018 noted stable liver metastasis. No evidence of worsening malignancy. Continue FOLFIRI + Avastin and repeat scans in 3 months. Cycle # 48 FOLFIRI + Avastin was on 06/06/2018. CEA 6.3 on 06/05/2018. Cycle # 49 FOLFIRI + Avastin was on 06/19/2018. CEA 6.1 on 06/19/2018. Cycle # 50 FOLFIRI + Avastin was on 07/03/2018. CEA 7.4 on 07/03/2018. Cycle # 51 FOLFIRI + Avastin was on 07/24/2018. CEA 6.7 on 07/24/2018. Cycle # 52 FOLFIRI + Avastin was on 08/14/2018. CEA 6.8 on 08/14/2018. Cycle # 53 FOLFIRI + Avastin was on 08/28/2018. CEA 6.5 on 08/27/2018. CT chest 09/06/2018 noted interval decrease in size of LUCY measuring up to 4 mm, suggestive of treatment response. No mediastinal or hilar LN  CT abdomen/pelvis 09/06/2018 Slight interval increase in size of a previously noted metastatic lesion within the right hepatic lobe. This may be related to differences in phase of enhancement compared to the most recent study from 5/31/2018, the lesion remains decreased in size compared to the more previous studies. No abdominal, retroperitoneal, or pelvic lymphadenopathy. Continue FOLFIRI + Avastin and repeat scans in 2 months. Cycle # 54 FOLFIRI + Avastin was on 09/11/2018. CEA 6.4 on 09/10/2018.   Cycle # 55 FOLFIRI + Avastin was on 09/25/2018. CEA 6.4 on 09/25/2018. Cycle # 56 FOLFIRI + Avastin was on 10/09/2018. CEA 6.7 on 10/08/2018. Cycle # 57 FOLFIRI + Avastin was on 10/23/2018. CEA 7.2 on 10/22/2018. CT chest 11/02/2018 stable 3 mm nodule within LUCY. No new right and left lung nodule. No mediastinal or osseous lesion. CT abdomen/pelvis 11/02/2018 stable metastatic disease to liver. No new metastatic disease identified. No mesenteric or retroperitoneal LN. No gross colonic lesion identified. Continue FOLFIRI + Avastin and repeat scans in 3 months. Cycle # 58 FOLFIRI + Avastin was on 11/06/2018. CEA 7.6 on 11/05/2018. Cycle # 59 FOLFIRI + Avastin was on 11/27/2018. CEA 6.9 on 11/26/2018. Cycle # 60 FOLFIRI + Avastin was on 12/11/2018. CEA 6.7 on 12/11/2018. Cycle # 61 FOLFIRI + Avastin was on 01/08/2019. CEA 5.6 on 01/07/2019. Cycle # 62 FOLFIRI + Avastin was on 01/29/2019. CEA 4.6 on 01/28/2019. Cycle # 63 FOLFIRI + Avastin was on 02/12/2019. CEA 4.3 on 02/11/2019. CT chest 02/21/2019 no evidence of metastatic disease. CT abdomen/pelvis 02/21/2019 stable hypodense liver lesions. No evidence of worsening metastatic disease. 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 Ordered MRI thoracic spine and referred to neurosurgery team.  Cycle # 64 FOLFIRI + Avastin was on 02/26/2019. CEA 4.7 on 02/25/2019. Cycle # 65 FOLFIRI + Avastin was on 03/12/2019. CEA 4.0 on 03/11/2019. Cycle # 66 FOLFIRI + Avastin was on 03/26/2019. CEA 4.7 on 03/25/2019. Cycle # 67 FOLFIRI + Avastin was on 04/09/2019. CEA 5.6 on 04/08/2019. Cycle # 68 FOLFIRI + Avastin was on 04/30/2019. CEA 4.9 on 04/29/2019. Cycle # 69 FOLFIRI + Avastin was on 05/14/2019. CEA 5.3 on 05/14/2019. CT chest 05/23/2019 stable small lung nodule with no evidence of metastatic disease. CT abdomen/pelvis 05/23/2019 Decrease conspicuity of the liver lesions. No new lesions seen. Stable splenomegaly.   Continue FOLFIRI + Avastin and repeat scans in 3 months. Cycle # 70 FOLFIRI + Avastin was on 06/04/2019. CEA 4.8 on 06/03/2019. Cycle # 71 FOLFIRI + Avastin was on 06/18/2019. CEA 4.6 on 06/17/2019. Cycle # 72 FOLFIRI + Avastin was on 07/09/2019. CEA 4.3 on 07/08/2019. Cycle # 73 FOLFIRI + Avastin was on 07/30/2019. CEA 5.0 on 07/29/2019. Cycle # 74 FOLFIRI + Avastin was on 08/13/2019. CEA 5.0 on 08/12/2019. CT chest 08/20/2019 negative  CT abdomen/pelvis 08/20/2019 Previous identified hepatic lesions are not visualized on the current exam.  Continue FOLFIRI + Avastin and repeat scans in 3 months. Cycle # 75 FOLFIRI + Avastin was on 08/27/2019. CEA 5.0 on 08/26/2019. Cycle # 76 FOLFIRI + Avastin was on 09/17/2019. CEA 5.5 on 09/16/2019. Cycle # 77 FOLFIRI + Avastin was on 10/15/2019. CEA 4.6 on 10/15/2019. Cycle # 78 FOLFIRI + Avastin was on 10/29/2019. CEA 4.1 on 10/28/2019. Cycle # 79 FOLFIRI + Avastin was on 11/12/2019. CEA 4.3 on 11/12/2019. Cycle # 80 FOLFIRI + Avastin was on 12/10/2019. CEA 4.0 on 12/09/2019. CT chest 12/19/2019 Stable 3 mm nodule within the left upper lobe, similar to the previous studies dating back to 11/2/2018, however decreased in size compared to the CT from 3/26/2018. No thoracic LN. CT abdomen/pelvis 12/19/2019 Previously described small hypodense lesions are more conspicuous on the current study compared to the recent study throughout the liver from 8/20/2019, however similar to the prior study from 2/21/2019. Findings may be related to differences in contrast opacification. No abdominal or pelvic lymphadenopathy. Continue FOLFIRI + Avastin and repeat scans in 2 months to f/u on liver lesions. Cycle # 81 FOLFIRI + Avastin was on 01/07/2020. CEA 3.8 on 01/06/2020. Cycle # 82 FOLFIRI + Avastin was on 01/21/2020. CEA 3.7 on 01/20/2020. Cycle # 83 FOLFIRI + Avastin was on 02/04/2020. CEA 4.1 on 02/03/2020. Cycle # 84 FOLFIRI + Avastin was on 02/18/2020.   CEA 4.6 on 02/17/2020. CT chest 2/28/2020 no evidence of metastatic disease to the lungs and no mediastinal or hilar adenopathy. CT abdomen pelvis 2/28/2020 no enhancing lesions seen within the liver to suggest metastatic disease. Wall thickening of the rectosigmoid junction. Images reviewed. Continue FOLFIRI Avastin and repeat scans in 3 months  EGD by Dr. Maura Phillips 03/02/2020 showing no masses or lesions. Cycle # 85 FOLFIRI + Avastin was on 03/03/2020. CEA 7.4 on 03/02/2020. Cycle # 86 FOLFIRI + Avastin was on 03/17/2020. CEA 4.5 on 03/16/2020. Colonoscopy on 03/23/2020 by Dr. Maura aguero (records requested). Cycle # 87 FOLFIRI + Avastin was on 03/31/2020. CEA 4.1 on 03/30/2020. Cycle # 88 FOLFIRI + Avastin was on 04/21/2020. CEA 6.4 on 04/20/2020. Cycle # 89 FOLFIRI + Avastin was on 05/05/2020. CEA 5.8 on 05/04/2020. Cycle # 90 FOLFIRI + Avastin was on 06/02/2020. CEA 5.5 on 06/01/2020. Cycle # 91 FOLFIRI + Avastin was on 06/16/2020. CEA 5.6 on 06/15/2020. CT chest 06/23/2020 noted no metastatic disease in the chest.   CT abdomen/pelvis 06/23/2020 Stable small liver lesions measuring up to 5 mm since 2019.   22 mm round mass in segment 8 of the liver with central high density stable from December 2019), previously measuring up to 34 mm in 2017. No additional metastatic disease in the abdomen or pelvis. Small amount of ascites. Overall stable disease. Continue FOLFIRI + Avastin and repeat scans in 2-3 months. Cycle # 92 FOLFIRI + Avastin was on 07/07/2020. CEA 5.7 on 07/06/2020. Cycle # 93 FOLFIRI + Avastin was on 07/21/2020. CEA 5.9 on 07/20/2020. Cycle # 94 FOLFIRI + Avastin was on 08/04/2020. CEA 5.5 on 08/04/2020. Cycle # 95 FOLFIRI + Avastin was on 08/25/2020. CEA 6.1 on 08/24/2020. CT chest 9/2/2020 stable unremarkable enhanced CT of the thorax. There is no metastatic disease to the lungs.   CT abdomen pelvis on 9/2/2020 stable 2.2 cm low attenuated lesion within the central aspect

## 2021-01-21 ENCOUNTER — HOSPITAL ENCOUNTER (OUTPATIENT)
Dept: INFUSION THERAPY | Age: 71
Discharge: HOME | End: 2021-01-21
Payer: MEDICARE

## 2021-01-21 DIAGNOSIS — C20 RECTAL CANCER METASTASIZED TO LIVER (HCC): ICD-10-CM

## 2021-01-21 DIAGNOSIS — Z51.11 ENCOUNTER FOR ANTINEOPLASTIC CHEMOTHERAPY: Primary | ICD-10-CM

## 2021-01-21 DIAGNOSIS — C78.7 RECTAL CANCER METASTASIZED TO LIVER (HCC): ICD-10-CM

## 2021-01-21 PROCEDURE — 6360000002 HC RX W HCPCS: Performed by: INTERNAL MEDICINE

## 2021-01-21 PROCEDURE — 96372 THER/PROPH/DIAG INJ SC/IM: CPT

## 2021-01-21 RX ADMIN — PEGFILGRASTIM-CBQV 6 MG: 6 INJECTION, SOLUTION SUBCUTANEOUS at 14:19

## 2021-02-01 ENCOUNTER — HOSPITAL ENCOUNTER (OUTPATIENT)
Age: 71
Discharge: HOME | End: 2021-02-01
Payer: MEDICARE

## 2021-02-01 LAB
ALBUMIN SERPL-MCNC: 3.6 G/DL (ref 3.5–5.2)
ALP BLD-CCNC: 150 U/L (ref 40–129)
ALT SERPL-CCNC: 17 U/L (ref 0–40)
ANION GAP SERPL CALCULATED.3IONS-SCNC: 8 MMOL/L (ref 7–16)
ANISOCYTOSIS: ABNORMAL
AST SERPL-CCNC: 26 U/L (ref 0–39)
BASOPHILS ABSOLUTE: 0 E9/L (ref 0–0.2)
BASOPHILS RELATIVE PERCENT: 0.5 % (ref 0–2)
BILIRUB SERPL-MCNC: 0.7 MG/DL (ref 0–1.2)
BUN BLDV-MCNC: 16 MG/DL (ref 8–23)
CALCIUM SERPL-MCNC: 8.1 MG/DL (ref 8.6–10.2)
CEA: 5.2 NG/ML (ref 0–5.2)
CHLORIDE BLD-SCNC: 106 MMOL/L (ref 98–107)
CO2: 23 MMOL/L (ref 22–29)
CREAT SERPL-MCNC: 1.3 MG/DL (ref 0.7–1.2)
EOSINOPHILS ABSOLUTE: 0.07 E9/L (ref 0.05–0.5)
EOSINOPHILS RELATIVE PERCENT: 3.5 % (ref 0–6)
GFR AFRICAN AMERICAN: >60
GFR NON-AFRICAN AMERICAN: 54 ML/MIN/1.73
GLUCOSE BLD-MCNC: 111 MG/DL (ref 74–99)
HCT VFR BLD CALC: 24.3 % (ref 37–54)
HEMOGLOBIN: 7.7 G/DL (ref 12.5–16.5)
LYMPHOCYTES ABSOLUTE: 0.24 E9/L (ref 1.5–4)
LYMPHOCYTES RELATIVE PERCENT: 12.3 % (ref 20–42)
MCH RBC QN AUTO: 30.6 PG (ref 26–35)
MCHC RBC AUTO-ENTMCNC: 31.7 % (ref 32–34.5)
MCV RBC AUTO: 96.4 FL (ref 80–99.9)
MONOCYTES ABSOLUTE: 0.1 E9/L (ref 0.1–0.95)
MONOCYTES RELATIVE PERCENT: 5.3 % (ref 2–12)
NEUTROPHILS ABSOLUTE: 1.58 E9/L (ref 1.8–7.3)
NEUTROPHILS RELATIVE PERCENT: 78.9 % (ref 43–80)
NUCLEATED RED BLOOD CELLS: 0.9 /100 WBC
PDW BLD-RTO: 18.7 FL (ref 11.5–15)
PLATELET # BLD: 96 E9/L (ref 130–450)
PLATELET CONFIRMATION: NORMAL
PMV BLD AUTO: 9.6 FL (ref 7–12)
POLYCHROMASIA: ABNORMAL
POTASSIUM SERPL-SCNC: 3.5 MMOL/L (ref 3.5–5)
RBC # BLD: 2.52 E12/L (ref 3.8–5.8)
SODIUM BLD-SCNC: 137 MMOL/L (ref 132–146)
TOTAL PROTEIN: 6.4 G/DL (ref 6.4–8.3)
WBC # BLD: 2 E9/L (ref 4.5–11.5)

## 2021-02-01 PROCEDURE — 82378 CARCINOEMBRYONIC ANTIGEN: CPT

## 2021-02-01 PROCEDURE — 36415 COLL VENOUS BLD VENIPUNCTURE: CPT

## 2021-02-01 PROCEDURE — 80053 COMPREHEN METABOLIC PANEL: CPT

## 2021-02-01 PROCEDURE — 85025 COMPLETE CBC W/AUTO DIFF WBC: CPT

## 2021-02-02 ENCOUNTER — OFFICE VISIT (OUTPATIENT)
Dept: ONCOLOGY | Age: 71
End: 2021-02-02
Payer: MEDICARE

## 2021-02-02 ENCOUNTER — HOSPITAL ENCOUNTER (OUTPATIENT)
Dept: INFUSION THERAPY | Age: 71
Discharge: HOME | End: 2021-02-02
Payer: MEDICARE

## 2021-02-02 VITALS
DIASTOLIC BLOOD PRESSURE: 68 MMHG | OXYGEN SATURATION: 99 % | WEIGHT: 191.3 LBS | BODY MASS INDEX: 25.35 KG/M2 | SYSTOLIC BLOOD PRESSURE: 123 MMHG | HEART RATE: 62 BPM | TEMPERATURE: 97.9 F | HEIGHT: 73 IN

## 2021-02-02 VITALS — RESPIRATION RATE: 18 BRPM | DIASTOLIC BLOOD PRESSURE: 67 MMHG | HEART RATE: 58 BPM | SYSTOLIC BLOOD PRESSURE: 117 MMHG

## 2021-02-02 DIAGNOSIS — C20 RECTAL CANCER METASTASIZED TO LIVER (HCC): Primary | ICD-10-CM

## 2021-02-02 DIAGNOSIS — C78.7 RECTAL CANCER METASTASIZED TO LIVER (HCC): Primary | ICD-10-CM

## 2021-02-02 DIAGNOSIS — C20 RECTAL CANCER (HCC): Primary | ICD-10-CM

## 2021-02-02 PROCEDURE — 96415 CHEMO IV INFUSION ADDL HR: CPT

## 2021-02-02 PROCEDURE — 2580000003 HC RX 258: Performed by: INTERNAL MEDICINE

## 2021-02-02 PROCEDURE — 96409 CHEMO IV PUSH SNGL DRUG: CPT

## 2021-02-02 PROCEDURE — 96368 THER/DIAG CONCURRENT INF: CPT

## 2021-02-02 PROCEDURE — 96375 TX/PRO/DX INJ NEW DRUG ADDON: CPT

## 2021-02-02 PROCEDURE — 96417 CHEMO IV INFUS EACH ADDL SEQ: CPT

## 2021-02-02 PROCEDURE — 96413 CHEMO IV INFUSION 1 HR: CPT

## 2021-02-02 PROCEDURE — 99214 OFFICE O/P EST MOD 30 MIN: CPT | Performed by: INTERNAL MEDICINE

## 2021-02-02 PROCEDURE — 6360000002 HC RX W HCPCS: Performed by: INTERNAL MEDICINE

## 2021-02-02 RX ORDER — METHYLPREDNISOLONE SODIUM SUCCINATE 125 MG/2ML
125 INJECTION, POWDER, LYOPHILIZED, FOR SOLUTION INTRAMUSCULAR; INTRAVENOUS ONCE
Status: CANCELLED | OUTPATIENT
Start: 2021-02-02 | End: 2021-02-02

## 2021-02-02 RX ORDER — SODIUM CHLORIDE 9 MG/ML
250 INJECTION, SOLUTION INTRAVENOUS CONTINUOUS
Status: CANCELLED | OUTPATIENT
Start: 2021-02-02 | End: 2021-02-20

## 2021-02-02 RX ORDER — SODIUM CHLORIDE 0.9 % (FLUSH) 0.9 %
10 SYRINGE (ML) INJECTION PRN
Status: CANCELLED | OUTPATIENT
Start: 2021-02-02

## 2021-02-02 RX ORDER — ATROPINE SULFATE 0.4 MG/ML
0.4 AMPUL (ML) INJECTION ONCE
Status: COMPLETED | OUTPATIENT
Start: 2021-02-02 | End: 2021-02-02

## 2021-02-02 RX ORDER — FLUOROURACIL 50 MG/ML
850 INJECTION, SOLUTION INTRAVENOUS ONCE
Status: COMPLETED | OUTPATIENT
Start: 2021-02-02 | End: 2021-02-02

## 2021-02-02 RX ORDER — DEXAMETHASONE SODIUM PHOSPHATE 10 MG/ML
10 INJECTION INTRAMUSCULAR; INTRAVENOUS ONCE
Status: COMPLETED | OUTPATIENT
Start: 2021-02-02 | End: 2021-02-02

## 2021-02-02 RX ORDER — 0.9 % SODIUM CHLORIDE 0.9 %
10 VIAL (ML) INJECTION ONCE
Status: CANCELLED | OUTPATIENT
Start: 2021-02-02 | End: 2021-02-02

## 2021-02-02 RX ORDER — DIPHENHYDRAMINE HYDROCHLORIDE 50 MG/ML
50 INJECTION INTRAMUSCULAR; INTRAVENOUS ONCE
Status: CANCELLED | OUTPATIENT
Start: 2021-02-02 | End: 2021-02-02

## 2021-02-02 RX ORDER — ATROPINE SULFATE 0.4 MG/ML
0.4 AMPUL (ML) INJECTION ONCE
Status: CANCELLED | OUTPATIENT
Start: 2021-02-02 | End: 2021-02-02

## 2021-02-02 RX ORDER — PALONOSETRON HYDROCHLORIDE 0.05 MG/ML
0.25 INJECTION, SOLUTION INTRAVENOUS ONCE
Status: COMPLETED | OUTPATIENT
Start: 2021-02-02 | End: 2021-02-02

## 2021-02-02 RX ORDER — SODIUM CHLORIDE 9 MG/ML
250 INJECTION, SOLUTION INTRAVENOUS CONTINUOUS
Status: DISCONTINUED | OUTPATIENT
Start: 2021-02-02 | End: 2021-02-03 | Stop reason: HOSPADM

## 2021-02-02 RX ORDER — HEPARIN SODIUM (PORCINE) LOCK FLUSH IV SOLN 100 UNIT/ML 100 UNIT/ML
500 SOLUTION INTRAVENOUS PRN
Status: CANCELLED | OUTPATIENT
Start: 2021-02-02

## 2021-02-02 RX ORDER — EPINEPHRINE 1 MG/ML
0.3 INJECTION, SOLUTION, CONCENTRATE INTRAVENOUS PRN
Status: CANCELLED | OUTPATIENT
Start: 2021-02-02

## 2021-02-02 RX ORDER — DEXAMETHASONE SODIUM PHOSPHATE 10 MG/ML
10 INJECTION, SOLUTION INTRAMUSCULAR; INTRAVENOUS ONCE
Status: CANCELLED | OUTPATIENT
Start: 2021-02-02

## 2021-02-02 RX ORDER — HEPARIN SODIUM (PORCINE) LOCK FLUSH IV SOLN 100 UNIT/ML 100 UNIT/ML
500 SOLUTION INTRAVENOUS PRN
Status: DISCONTINUED | OUTPATIENT
Start: 2021-02-02 | End: 2021-02-03 | Stop reason: HOSPADM

## 2021-02-02 RX ORDER — SODIUM CHLORIDE 9 MG/ML
INJECTION, SOLUTION INTRAVENOUS CONTINUOUS
Status: CANCELLED | OUTPATIENT
Start: 2021-02-02

## 2021-02-02 RX ORDER — FLUOROURACIL 50 MG/ML
850 INJECTION, SOLUTION INTRAVENOUS ONCE
Status: CANCELLED | OUTPATIENT
Start: 2021-02-02

## 2021-02-02 RX ORDER — SODIUM CHLORIDE 0.9 % (FLUSH) 0.9 %
10 SYRINGE (ML) INJECTION PRN
Status: DISCONTINUED | OUTPATIENT
Start: 2021-02-02 | End: 2021-02-03 | Stop reason: HOSPADM

## 2021-02-02 RX ORDER — PALONOSETRON HYDROCHLORIDE 0.05 MG/ML
0.25 INJECTION, SOLUTION INTRAVENOUS ONCE
Status: CANCELLED | OUTPATIENT
Start: 2021-02-02

## 2021-02-02 RX ADMIN — LEUCOVORIN CALCIUM 850 MG: 10 INJECTION INTRAMUSCULAR; INTRAVENOUS at 10:12

## 2021-02-02 RX ADMIN — SODIUM CHLORIDE 250 ML: 9 INJECTION, SOLUTION INTRAVENOUS at 08:58

## 2021-02-02 RX ADMIN — SODIUM CHLORIDE 400 MG: 9 INJECTION, SOLUTION INTRAVENOUS at 10:14

## 2021-02-02 RX ADMIN — SODIUM CHLORIDE, PRESERVATIVE FREE 10 ML: 5 INJECTION INTRAVENOUS at 12:11

## 2021-02-02 RX ADMIN — FLUOROURACIL 850 MG: 50 INJECTION, SOLUTION INTRAVENOUS at 12:07

## 2021-02-02 RX ADMIN — BEVACIZUMAB 400 MG: 400 INJECTION, SOLUTION INTRAVENOUS at 09:31

## 2021-02-02 RX ADMIN — PALONOSETRON 0.25 MG: 0.25 INJECTION, SOLUTION INTRAVENOUS at 08:59

## 2021-02-02 RX ADMIN — Medication 500 UNITS: at 12:11

## 2021-02-02 RX ADMIN — ATROPINE SULFATE 0.4 MG: 0.4 INJECTION, SOLUTION INTRAMUSCULAR; INTRAVENOUS; SUBCUTANEOUS at 09:00

## 2021-02-02 RX ADMIN — DEXAMETHASONE SODIUM PHOSPHATE 10 MG: 10 INJECTION INTRAMUSCULAR; INTRAVENOUS at 09:03

## 2021-02-02 NOTE — PROGRESS NOTES
[place] [id number]  Attending Clinic Note     Reason for Visit: Follow-up on a patient with Metastatic Rectal Cancer.     PCP: Max Jackson MD     History of Present Illness:  70 y/o  male who was referred to see Dr. Izzy Gongora (GI team) for evaluation of bright red blood per rectum, mild anemia and change in bowel habits with diarrhea. CEA 2640 on 10/19/2015. AlcP 299 AST 57 ALT 75 on 10/19/2015. Colonoscopy in 2013 noted no significant polyps, colitis or lesions at that time. Denies any Family History of colorectal cancer or polyps.     Colonoscopy on 10/19/2015 revealed:  1. Ascending polyp, 8 mm, hot snare: Tubulovillous adenoma. 2. Transverse polyp, 1 cm, hot snare: Serrated polyp most consistent with sessile serrated adenoma. 3. Five splenic flexure polyps, three - 5 mm, 7 mm, 8 mm, hot snare and biopsy: Four segments of Tubular Adenoma  4. Descending polyp, 5 mm, biopsy: Tubular adenoma. 5. Sigmoid polyp, 4 mm biopsy, Serrated polyp most consistent with sessile serrated adenoma. 6. Two rectal polyps, 4 mm biopsy: Serrated polyp most consistent with hyperplastic polyp. 7. Rectosigmoid colon mass (large mass approximately 65% circumference of the lumen; Very friable, firm and hard): Tubulovillous adenoma with associated focal erosion and fibroplasia.      CT scan abdomen/pelvis on 10/26/2015:  1. Small nodules at lung bases likely represent metastatic colon   cancer. 2. Extensive likely metastatic colon cancer throughout the liver.      3. Mild mural thickening and luminal narrowing in the   terminal ileum, otherwise nonspecific.      Colonoscopy with snare removal rectal mass was performed by Dr. Jay Hernandez. Pathology proved:  Rectal polyp: Invasive adenocarcinoma involving villous adenoma and extending to the cauterized edge of excision. KRAS Mutation: Mutation detected. BRAF Mutation: Mutation not detected. 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     CEA 3488. Bone scan on 11/10/2015 noted no metastatic disease. CT chest on 11/10/2015 revealed Multiple pulmonary nodules in the upper and lower lobes consistent with metastatic disease;   Hepatic metastasis also visualized. 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 Mediport was placed. Cycle # 1 of FOLFOX + Avastin was on 11/23/2015. CEA was 4555 on 11/23/2015. Cycle # 2 of FOLFOX + Avastin was on 12/08/2015. CEA was 3160 on 12/08/2015. Cycle # 3 of FOLFOX + Avastin was on 12/22/2015. CEA was 2516 on 12/21/2015. Cycle # 4 of FOLFOX + Avastin was on 01/05/2016. CEA was 2098 on 01/05/2015. Cycle # 5 of FOLFOX + Avastin was on 01/19/2016. CEA was 1511 on 01/05/2015.     -Bone scan on 01/26/2016 noted no metastatic disease. CT chest on 01/26/2016 revealed Significant response to treatment with no visible residual nodules. CT scan abdomen/pelvis on 01/26/2016 noted Interval decreased size of multiple masses in the liver compatible with treatment response. Continue another 2 months of FOLFOX + Avastin and repeat scans. Cycle # 6 of FOLFOX + Avastin was on 02/02/2016.  on 02/02/2016. Cycle # 7 of FOLFOX + Avastin was on 02/16/2016.  on 02/16/2016. Cycle # 8 of FOLFOX + Avastin was on 03/01/2016.  on 03/01/2016. Cycle # 9 of FOLFOX + Avastin was on 03/15/2016.  on 03/15/2016. Cycle # 10 of FOLFOX + Avastin was on 03/29/2016. .4 on 03/29/2016. Cycle # 11 of FOLFOX + Avastin was on 04/12/2016. .4 on 04/12/2016.    Increased CEA; new mural thickening in the cecum and new left hip lesion suspicious for bone metastasis are consistent with disease progression; He derived maximum benefit from FOLFOX/AVASTIN. D/C FOLFOX/AVASTIN. We recommended FOLFIRI second line therapy. Cycle # 1 FOLFIRI was on 08/02/2016. CEA 40.8 on 08/02/2016. Xgeva q4 weeks started on 08/02/2016. Cycle # 2 FOLFIRI was on 08/16/2016. CEA 31.7 on 08/16/2016. Cycle # 3 FOLFIRI (added Avastin) was on 08/30/2016 given that ulcers healed on EGD 08/15/2016 by Dr. Kirt Andres; Protonix bid since avastin re-started. Colonoscopy on 08/29/2016 (to look at the cecal area) unremarkable. CEA 26.7 on 08/30/2016. Cycle # 4 FOLFIRI/Avastin was on 09/13/2016. CEA 23.4 on 09/13/2016. Cycle # 5 FOLFIRI/Avastin was on 09/27/2016. CEA 22.3 on 09/27/2016. Cycle # 6 FOLFIRI/Avastin was on 10/11/2016. CEA 18.4 on 10/11/2016.      Bone scan 10/21/2016 stable bone metastasis; ? New lesion anterior left sixth rib likely interval healing fracture. CT abdomen/pelvis revealed stable hepatic metastasis. CT chest negative for metastatic disease. Continue FOLFIRI/Avastin and repeat scans in 3 months. Cycle # 7 FOLFIRI/Avastin was on 10/25/2016. CEA 16.1  Cycle # 8 FOLFIRI/Avastin was on 11/08/2016. CEA 15.7  Cycle # 9 FOLFIRI/Avastin was on 11/29/2016. CEA 13.6 on 11/29/2016. Cycle # 10 FOLFIRI/Avastin was on 12/13/2016. CEA 10.6 on 12/13/2016. Cycle # 11 FOLFIRI/Avastin was on 12/27/2016. CEA 11.1 on 12/27/2016. Cycle # 12 FOLFIRI/Avastin was on 01/10/2017.  CEA 9.7 on 01/10/2017.    CT chest 01/23/2017 No new pulmonary nodule or lymphadenopathy. Patchy groundglass opacities within the left lower lobe, suggestive of infectious or inflammatory etiology. Followup CT chest in 3 months. Slight increased linear sclerosis along the left anterior sixth rib corresponding to an area of increased uptake on the prior bone scan from 10/21/2016, favored to be related to interval healing changes of a nondisplaced fracture. CT abdomen/pelvis on 01/23/2017 Redemonstration of multiple hepatic metastases, which are similar compared to the prior CT from 10/21/2016. Redemonstration of area of increased sclerosis along the right acetabulum suspicious for metastatic disease. Bone scan on 01/23/2017 Stable subtle uptake within the left proximal diaphysis, again suggestive of metastatic disease  Decreased subtle uptake within the medial right acetabulum. Decreased uptake within the left anterior sixth rib corresponding to linear sclerosis on the recent CT, favored to be related to an joint rib fracture. Continue FOLFIRI + Avastin and repeat scans in 3 months. Cycle # 13 FOLFIRI + Avastin was on 01/24/2017. Cycle # 14 FOLFIRI + Avastin was on 02/07/2017. Cycle # 15 FOLFIRI + Avastin was on 02/21/2017. Cycle # 16 FOLFIRI + Avastin was on 03/07/2017. Cycle # 17 FOLFIRI + Avastin was on 03/21/2017. Cycle # 18 FOLFIRI + Avastin was on 04/04/2017. CT chest 04/17/2017 negative for metastatic disease. CT abdomen/pelvis 04/17/2017 Stable hypodense metastatic lesions within the liver. Stable area of increased sclerosis along the right acetabulum  Bone scan 04/17/2017 Stable subtle uptake within the left proximal femoral diaphysis; No definite abnormal uptake in the region of a sclerotic lesion along the posterior column of the right acetabulum noted on CT. Stable slight uptake within the left anterior sixth rib corresponding to linear sclerosis on the recent CT, favored to be related to a fracture. Continue FOLFIRI + Avastin and repeat scans in 3 months. Cycle # 19 FOLFIRI + Avastin was on 04/18/2017. Cycle # 20 FOLFIRI + Avastin was on 05/02/2017. Cycle # 21 FOLFIRI + Avastin was on 05/16/2017. Cycle # 22 FOLFIRI + Avastin was on 05/30/2017. Cycle # 23 FOLFIRI + Avastin was on 06/13/2017. Cycle # 24 FOLFIRI + Avastin was on 06/27/2017. Cycle # 25 FOLFIRI + Avastin was on 07/11/2017. Cycle # 26 FOLFIRI + Avastin was on 07/25/2017. Cycle # 27 FOLFIRI + Avastin was on 08/08/2017. Cycle # 28 FOLFIRI + Avastin was on 08/22/2017. Cycle # 29 FOLFIRI + Avastin was on 09/05/2017. Cycle # 30 FOLFIRI + Avastin was on 09/19/2017. Bone scan 09/26/2017 stable. CT abdomen/pelvis on 09/26/2017 Stable hypodense mass in the liver. Stable sclerotic lesion in the acetabulum. CT chest 09/26/2017 negative for metastatic disease. Cycle # 31 FOLFIRI + Avastin was on 10/03/2017. CEA 7.4 on 10/03/2017. Cycle # 32 FOLFIRI + Avastin was on 10/17/2017. CEA 6.0 on 10/17/2017. Cycle # 33 FOLFIRI + Avastin was on 10/31/2017. CEA 6.8 on 10/31/2017. Cycle # 34 FOLFIRI + Avastin was on 11/14/2017. CEA 7.2 on 11/14/2017. Cycle # 35 FOLFIRI + Avastin was on 11/28/2017. CEA 5.1 on 11/28/2017. Cycle # 36 FOLFIRI + Avastin was on 12/12/2017. CEA 6.1 on 12/12/2017. Bone scan 12/22/2017 noted no evidence of metastatic disease to the axial or appendicular skeleton. CT chest 12/22/2017 noted no evidence of metastatic disease. CT abdomen/pelvis 12/22/2017 noted stable hypodense lesions in the liver. Continue FOLFIRI + Avastin and repeat scans in 3 months. Cycle # 37 FOLFIRI + Avastin was on 12/27/2018. Cycle # 38 FOLFIRI + Avastin was on 01/09/2018. Cycle # 39 FOLFIRI + Avastin was on 01/23/2018. Cycle # 40 FOLFIRI + Avastin was on 02/06/2018. Cycle # 41 FOLFIRI + Avastin was on 02/20/2018. Cycle # 42 FOLFIRI + Avastin was on 03/06/2018. Cycle # 43 FOLFIRI + Avastin was on 03/20/2018. Bone scan on 03/26/2018 negative for metastatic disease. CT chest 03/26/2018 noted ? new 7 mm nodule in LUCY. CT abdomen/pelvis 03/26/2018 noted stable hypodense lesions in the liver. ? New small ascites in the abdomen. Patient wants to continue to monitor the lung finding and repeat scans in 2 months instead of changing the current regimen into oral Lonsurf. Cycle # 44 FOLFIRI + Avastin was on 04/03/2018. CEA 6.3 on 04/03/2018. Cycle # 45 FOLFIRI + Avastin was on 04/24/2018. CEA 5.3 on 04/24/2018. Cycle # 46 FOLFIRI + Avastin was on 05/08/2018. CEA 5.4 on 05/08/2018. Cycle # 47 FOLFIRI + Avastin was on 05/22/2018. CEA 6.1 on 05/22/2018. CT chest 05/31/2018 noted stable nodule in LUCY. No evidence of worsening malignancy. CT abdomen/pelvis on 05/31/2018 noted stable liver metastasis. No evidence of worsening malignancy. Continue FOLFIRI + Avastin and repeat scans in 3 months. Cycle # 48 FOLFIRI + Avastin was on 06/06/2018. Cycle # 49 FOLFIRI + Avastin was on 06/19/2018. Cycle # 50 FOLFIRI + Avastin was on 07/03/2018. Cycle # 51 FOLFIRI + Avastin was on 07/24/2018. Cycle # 52 FOLFIRI + Avastin was on 08/14/2018. Cycle # 53 FOLFIRI + Avastin was on 08/28/2018. CT chest 09/06/2018 noted interval decrease in size of LUCY measuring up to 4 mm, suggestive of treatment response. No mediastinal or hilar LN  CT abdomen/pelvis 09/06/2018 Slight interval increase in size of a previously noted metastatic lesion within the right hepatic lobe. This may be related to differences in phase of enhancement compared to the most recent study from 5/31/2018, the lesion remains decreased in size compared to the more previous studies. No abdominal, retroperitoneal, or pelvic lymphadenopathy. Continue FOLFIRI + Avastin and repeat scans in 2 months. Cycle # 54 FOLFIRI + Avastin was on 09/11/2018. Cycle # 55 FOLFIRI + Avastin was on 09/25/2018. Cycle # 56 FOLFIRI + Avastin was on 10/09/2018. Cycle # 57 FOLFIRI + Avastin was on 10/23/2018. CT chest 11/02/2018 stable 3 mm nodule within LUCY. No new right and left lung nodule. No mediastinal or osseous lesion. CT abdomen/pelvis 11/02/2018 stable metastatic disease to liver. No new metastatic disease identified. No mesenteric or retroperitoneal LN. No gross colonic lesion identified. Continue FOLFIRI + Avastin and repeat scans in 3 months. Cycle # 58 FOLFIRI + Avastin was on 11/06/2018. CEA 7.6 on 11/05/2018. Cycle # 59 FOLFIRI + Avastin was on 11/27/2018. CEA 6.9 on 11/26/2018. Cycle # 60 FOLFIRI + Avastin was on 12/11/2018. CEA 6.7 on 12/11/2018. Cycle # 61 FOLFIRI + Avastin was on 01/08/2019. CEA 5.6 on 01/07/2019. Cycle # 62 FOLFIRI + Avastin was on 01/29/2019. CEA 4.6 on 01/28/2019. Cycle # 63 FOLFIRI + Avastin was on 02/12/2019. CEA 4.3 on 02/11/2019. CT chest 02/21/2019 no evidence of metastatic disease. CT abdomen/pelvis 02/21/2019 stable hypodense liver lesions. No evidence of worsening metastatic disease. 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 Ordered MRI thoracic spine and referred to neurosurgery team.    Cycle # 64 FOLFIRI + Avastin was on 02/26/2019. CEA 4.7 on 02/25/2019. Cycle # 65 FOLFIRI + Avastin was on 03/12/2019. CEA 4.0 on 03/11/2019. Cycle # 66 FOLFIRI + Avastin was on 03/26/2019. CEA 4.7 on 03/25/2019. Cycle # 67 FOLFIRI + Avastin was on 04/09/2019. CEA 5.6 on 04/08/2019. Cycle # 68 FOLFIRI + Avastin was on 04/30/2019. CEA 4.9 on 04/29/2019. Cycle # 69 FOLFIRI + Avastin was on 05/14/2019. CEA 5.3 on 05/14/2019. CT chest 05/23/2019 stable small lung nodule with no evidence of metastatic disease. CT abdomen/pelvis 05/23/2019 Decrease conspicuity of the liver lesions. No new lesions seen. Stable splenomegaly. Cycle # 84 FOLFIRI + Avastin was on 02/18/2020. CEA 4.6 on 02/17/2020. EGD by Dr. Garret Ahumada 03/02/2020 showing no masses or lesions  Cycle # 85 FOLFIRI + Avastin was on 03/03/2020. CEA 7.4 on 03/02/2020. Cycle # 86 FOLFIRI + Avastin was on 03/17/2020. CEA 4.5 on 03/16/2020. Colonoscopy on 03/23/2020 by Dr. Garret Ahumada unremarkable (records requested). Cycle # 87 FOLFIRI + Avastin was on 03/31/2020. CEA 4.1 on 03/30/2020. Cycle # 88 FOLFIRI + Avastin was on 04/21/2020. CEA 6.4 on 04/20/2020. Cycle # 89 FOLFIRI + Avastin was on 05/05/2020. CEA 5.8 on 05/04/2020. Cycle # 90 FOLFIRI + Avastin was on 06/02/2020. CEA 5.5 on 06/01/2020. Cycle # 91 FOLFIRI + Avastin was on 06/16/2020. CEA 5.6 on 06/15/2020. CT chest 06/23/2020 noted no metastatic disease in the chest.   CT abdomen/pelvis 06/23/2020 Stable small liver lesions measuring up to 5 mm since 2019.   22 mm round mass in segment 8 of the liver with central high density stable from December 2019), previously measuring up to 34 mm in 2017. No additional metastatic disease in the abdomen or pelvis. Small amount of ascites. Overall stable disease. Continue FOLFIRI + Avastin and repeat scans in 2-3 months. Cycle # 92 FOLFIRI + Avastin was on 07/07/2020. CEA 5.7 on 07/06/2020. Cycle # 93 FOLFIRI + Avastin was on 07/21/2020. CEA 5.9 on 07/20/2020. Cycle # 94 FOLFIRI + Avastin was on 08/04/2020. CEA 5.5 on 08/04/2020. Cycle # 95 FOLFIRI + Avastin was on 08/25/2020. CEA 6.1 on 08/24/2020. CT chest 9/2/2020 stable unremarkable enhanced CT of the thorax. There is no metastatic disease to the lungs. CT abdomen pelvis on 9/2/2020 stable 2.2 cm low attenuated lesion within the central aspect of the right hepatic lobe favored to represent treated metastatic disease. No new hepatic lesion is identified. Stable splenomegaly  There is no appreciable colonic lesion or stricture  Pericholecystic fluid of uncertain etiology. General surgery team consulted. /68   Pulse 62   Temp 97.9 °F (36.6 °C) (Temporal)   Ht 6' 1\" (1.854 m)   Wt 191 lb 4.8 oz (86.8 kg)   SpO2 99%   BMI 25.24 kg/m²   GENERAL: Alert, oriented x 3, not in acute distress  HEENT: PERRLA, EOMI. No oral lesions. NECK: Supple. Without lymphadenopathy. LUNGS: Lungs are CTA bilaterally, with no wheezing, crackles or rhonchi. CARDIOVASCULAR: Regular rhythm. No murmurs, rubs or gallops. ABDOMEN: Soft. Non-tender, non-distended. Positive bowel sounds. EXTREMITIES: Without clubbing, cyanosis or edema. NEUROLOGIC: No focal deficits. ECOG PS 1    Diagnostics:  Lab Results   Component Value Date    WBC 2.0 (L) 02/01/2021    HGB 7.7 (L) 02/01/2021    HCT 24.3 (L) 02/01/2021    MCV 96.4 02/01/2021    PLT 96 (L) 02/01/2021     Lab Results   Component Value Date     02/01/2021    K 3.5 02/01/2021     02/01/2021    CO2 23 02/01/2021    BUN 16 02/01/2021    CREATININE 1.3 (H) 02/01/2021    GLUCOSE 111 (H) 02/01/2021    CALCIUM 8.1 (L) 02/01/2021    PROT 6.4 02/01/2021    LABALBU 3.6 02/01/2021    BILITOT 0.7 02/01/2021    ALKPHOS 150 (H) 02/01/2021    AST 26 02/01/2021    ALT 17 02/01/2021    LABGLOM 54 02/01/2021    GFRAA >60 02/01/2021     Lab Results   Component Value Date    CEA 5.2 02/01/2021      Impression/Plan:  70 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 KRAS Mutation: Mutation detected. BRAF Mutation: Mutation not detected. 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 CT scan abdomen/pelvis on 10/26/2015 revealed small nodules at the lung bases, extensive liver lesions consistent with metastatic rectosigmoid cancer. CEA 3488 on 10/30/2015. Bone scan on 11/10/2015 noted no metastatic disease. CT chest on 11/10/2015 revealed Multiple pulmonary nodules in the upper and lower lobes consistent with metastatic disease; Hepatic metastasis also visualized. 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 Mediport was placed. Cycle # 1 of FOLFOX + Avastin was on 11/23/2015. CEA was 4555 on 11/23/2015. Cycle # 2 of FOLFOX + Avastin was on 12/08/2015. CEA was 3160 on 12/08/2015. Cycle # 3 of FOLFOX + Avastin was on 12/22/2015. CEA was 2516 on 12/21/2015. Cycle # 4 of FOLFOX + Avastin was on 01/05/2016. CEA was 2098 on 01/05/2015. Cycle # 5 of FOLFOX + Avastin was on 01/19/2016. CEA was 1511 on 01/05/2015.     -Bone scan on 01/26/2016 noted no metastatic disease. CT chest on 01/26/2016 revealed Significant response to treatment with no visible residual nodules. CT scan abdomen/pelvis on 01/26/2016 noted Interval decreased size of multiple masses in the liver compatible with treatment response. Continue another 2 months of FOLFOX + Avastin and repeat scans. Cycle # 6 of FOLFOX + Avastin was on 02/02/2016.  on 02/02/2016. Cycle # 7 of FOLFOX + Avastin was on 02/16/2016.  on 02/16/2016. Cycle # 8 of FOLFOX + Avastin was on 03/01/2016.  on 03/01/2016. Cycle # 9 of FOLFOX + Avastin was on 03/15/2016.  on 03/15/2016. Cycle # 10 of FOLFOX + Avastin was on 03/29/2016. .4 on 03/29/2016. Cycle # 11 of FOLFOX + Avastin was on 04/12/2016. .4 on 04/12/2016.     Re-staging scans on 04/19/2016: CT Chest: clear lungs; no evidence of recurring pulmonary nodule; CT Abdomen/Pelvis: Further interval decrease in size of the multiple metastatic hepatic lesions, the largest lesion now measures 3.9 x 3.5 cm and previously measured 4.5 cm in maximum diameter. No mesenteric lymphadenopathy is identified; Bone Scan: No evidence of osseous metastasis. Decreased uptake within the left anterior sixth rib corresponding to linear sclerosis on the recent CT, favored to be related to an joint rib fracture. Continue FOLFIRI + Avastin and repeat scans in 3 months. Cycle # 13 FOLFIRI + Avastin was on 01/24/2017. Cycle # 14 FOLFIRI + Avastin was on 02/07/2017. Cycle # 15 FOLFIRI + Avastin was on 02/21/2017. Cycle # 16 FOLFIRI + Avastin was on 03/07/2017. Cycle # 17 FOLFIRI + Avastin was on 03/21/2017. Cycle # 18 FOLFIRI + Avastin was on 04/04/2017. CT chest 04/17/2017 negative for metastatic disease. CT abdomen/pelvis 04/17/2017 Stable hypodense metastatic lesions within the liver. Stable area of increased sclerosis along the right acetabulum  Bone scan 04/17/2017 Stable subtle uptake within the left proximal femoral diaphysis; No definite abnormal uptake in the region of a sclerotic lesion along the posterior column of the right acetabulum noted on CT. Stable slight uptake within the left anterior sixth rib corresponding to linear sclerosis on the recent CT, favored to be related to a fracture. Continue FOLFIRI + Avastin and repeat scans in 3 months. Cycle # 19 FOLFIRI + Avastin was on 04/18/2017. CEA 7.5 on 04/18/2017. Cycle # 20 FOLFIRI + Avastin was on 05/02/2017. CEA 7.7 on 05/02/2017. Cycle # 21 FOLFIRI + Avastin was on 05/16/2017. CEA 7.1 on 05/16/2017. Cycle # 22 FOLFIRI + Avastin was on 05/30/2017. CEA 8.2 on 05/30/2017. Cycle # 23 FOLFIRI + Avastin was on 06/13/2017. CEA 7.7 on 06/13/2017. Cycle # 24 FOLFIRI + Avastin was on 06/27/2017. CEA 6.6 on 06/27/2017. Re-staging scans 07/05/2017:  Bone scan stable proximal left femoral diaphysis, right acetabulum, and left anterior sixth rib lesions;  CT abdomen/pelvis stable hypodense metastatic lesions within the liver; CT chest without convincing evidence of metastatic disease. Cycle # 25 FOLFIRI + Avastin was on 07/11/2017. CEA 6.3 on 07/11/2017. Cycle # 26 FOLFIRI + Avastin was on 07/25/2017. CEA 7.3 on 07/25/2017. Cycle # 27 FOLFIRI + Avastin was on 08/08/2017. CEA 6.5 on 08/08/2017. Cycle # 28 FOLFIRI + Avastin was on 08/22/2017. CEA 5.9 on 08/22/2017. Cycle # 29 FOLFIRI + Avastin was on 09/05/2017. CEA 6.3 on 09/05/2017. Cycle # 30 FOLFIRI + Avastin was on 09/19/2017. CEA 6.3 on 09/19/2017. Bone scan 09/26/2017 stable. CT abdomen/pelvis on 09/26/2017 Stable hypodense mass in the liver. Stable sclerotic lesion in the acetabulum. CT chest 09/26/2017 negative for metastatic disease. Cycle # 31 FOLFIRI + Avastin was on 10/03/2017. CEA 7.4 on 10/03/2017. Cycle # 32 FOLFIRI + Avastin was on 10/17/2017. CEA 6.0 on 10/17/2017. Cycle # 33 FOLFIRI + Avastin was on 10/31/2017. CEA 6.8 on 10/31/2017. Cycle # 34 FOLFIRI + Avastin was on 11/14/2017. CEA 7.2 on 11/14/2017. Cycle # 35 FOLFIRI + Avastin was on 11/28/2017. CEA 5.1 on 11/28/2017. Cycle # 36 FOLFIRI + Avastin was on 12/12/2017. CEA 6.1 on 12/12/2017. Bone scan 12/22/2017 noted no evidence of metastatic disease to the axial or appendicular skeleton. CT chest 12/22/2017 noted no evidence of metastatic disease. CT abdomen/pelvis 12/22/2017 noted stable hypodense lesions in the liver. Continue FOLFIRI + Avastin and repeat scans in 3 months. Cycle # 37 FOLFIRI + Avastin was on 12/27/2018. CEA 6.7 on 12/27/2017. Cycle # 38 FOLFIRI + Avastin was on 01/09/2018. CEA 5.0 on 01/09/2018. Cycle # 39 FOLFIRI + Avastin was on 01/23/2018. CEA 6.5 on 01/23/2018. Cycle # 40 FOLFIRI + Avastin was on 02/06/2018. CEA 6.9 on 02/06/2018. Cycle # 41 FOLFIRI + Avastin was on 02/20/2018. CEA 6.4 on 02/20/2018. Cycle # 42 FOLFIRI + Avastin was on 03/06/2018. CEA 6.6 on 03/06/2018. Cycle # 43 FOLFIRI + Avastin was on 03/20/2018. CEA 5.7 on 03/20/2018. Bone scan on 03/26/2018 negative for metastatic disease. CT chest 03/26/2018 noted ? new 7 mm nodule in LUCY. CT abdomen/pelvis 03/26/2018 noted stable hypodense lesions in the liver. ? New small ascites in the abdomen. Patient wants to continue to monitor the lung finding and repeat scans in 2 months instead of changing the current chemotherapy regimen to oral Lonsurf. Cycle # 44 FOLFIRI + Avastin was on 04/03/2018. CEA 6.3 on 04/03/2018. Cycle # 45 FOLFIRI + Avastin was on 04/24/2018. CEA 5.3 on 04/24/2018. Cycle # 46 FOLFIRI + Avastin was on 05/08/2018. CEA 5.4 on 05/08/2018. Cycle # 47 FOLFIRI + Avastin was on 05/22/2018. CEA 6.1 on 05/22/2018. CT chest 05/31/2018 noted stable nodule in LUCY. No evidence of worsening malignancy. CT abdomen/pelvis on 05/31/2018 noted stable liver metastasis. No evidence of worsening malignancy. Continue FOLFIRI + Avastin and repeat scans in 3 months. Cycle # 48 FOLFIRI + Avastin was on 06/06/2018. CEA 6.3 on 06/05/2018. Cycle # 49 FOLFIRI + Avastin was on 06/19/2018. CEA 6.1 on 06/19/2018. Cycle # 50 FOLFIRI + Avastin was on 07/03/2018. CEA 7.4 on 07/03/2018. Cycle # 51 FOLFIRI + Avastin was on 07/24/2018. CEA 6.7 on 07/24/2018. Cycle # 52 FOLFIRI + Avastin was on 08/14/2018. CEA 6.8 on 08/14/2018. Cycle # 53 FOLFIRI + Avastin was on 08/28/2018. CEA 6.5 on 08/27/2018. CT chest 09/06/2018 noted interval decrease in size of LUCY measuring up to 4 mm, suggestive of treatment response. No mediastinal or hilar LN  CT abdomen/pelvis 09/06/2018 Slight interval increase in size of a previously noted metastatic lesion within the right hepatic lobe. This may be related to differences in phase of enhancement compared to the most recent study from 5/31/2018, the lesion remains decreased in size compared to the more previous studies. No abdominal, retroperitoneal, or pelvic lymphadenopathy. Continue FOLFIRI + Avastin and repeat scans in 2 months. Cycle # 54 FOLFIRI + Avastin was on 09/11/2018. CEA 6.4 on 09/10/2018. Cycle # 55 FOLFIRI + Avastin was on 09/25/2018. CEA 6.4 on 09/25/2018. Cycle # 56 FOLFIRI + Avastin was on 10/09/2018. CEA 6.7 on 10/08/2018. Cycle # 57 FOLFIRI + Avastin was on 10/23/2018. CEA 7.2 on 10/22/2018. CT chest 11/02/2018 stable 3 mm nodule within LUCY. No new right and left lung nodule. No mediastinal or osseous lesion. CT abdomen/pelvis 11/02/2018 stable metastatic disease to liver. No new metastatic disease identified. No mesenteric or retroperitoneal LN. No gross colonic lesion identified. Continue FOLFIRI + Avastin and repeat scans in 3 months. Cycle # 58 FOLFIRI + Avastin was on 11/06/2018. CEA 7.6 on 11/05/2018. Cycle # 59 FOLFIRI + Avastin was on 11/27/2018. CEA 6.9 on 11/26/2018. Cycle # 60 FOLFIRI + Avastin was on 12/11/2018. CEA 6.7 on 12/11/2018. Cycle # 61 FOLFIRI + Avastin was on 01/08/2019. CEA 5.6 on 01/07/2019. Cycle # 62 FOLFIRI + Avastin was on 01/29/2019. CEA 4.6 on 01/28/2019. Cycle # 63 FOLFIRI + Avastin was on 02/12/2019. CEA 4.3 on 02/11/2019. CT chest 02/21/2019 no evidence of metastatic disease. CT abdomen/pelvis 02/21/2019 stable hypodense liver lesions. No evidence of worsening metastatic disease. 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 Ordered MRI thoracic spine and referred to neurosurgery team.  Cycle # 64 FOLFIRI + Avastin was on 02/26/2019. CEA 4.7 on 02/25/2019. Cycle # 65 FOLFIRI + Avastin was on 03/12/2019. CEA 4.0 on 03/11/2019. Cycle # 66 FOLFIRI + Avastin was on 03/26/2019. CEA 4.7 on 03/25/2019. Cycle # 67 FOLFIRI + Avastin was on 04/09/2019. CEA 5.6 on 04/08/2019. Cycle # 68 FOLFIRI + Avastin was on 04/30/2019. CEA 4.9 on 04/29/2019. Cycle # 69 FOLFIRI + Avastin was on 05/14/2019. CEA 5.3 on 05/14/2019. CT chest 05/23/2019 stable small lung nodule with no evidence of metastatic disease. CT abdomen/pelvis 05/23/2019 Decrease conspicuity of the liver lesions. No new lesions seen. Stable splenomegaly. Continue FOLFIRI + Avastin and repeat scans in 3 months. Cycle # 70 FOLFIRI + Avastin was on 06/04/2019. CEA 4.8 on 06/03/2019. Cycle # 71 FOLFIRI + Avastin was on 06/18/2019. CEA 4.6 on 06/17/2019. Cycle # 72 FOLFIRI + Avastin was on 07/09/2019. CEA 4.3 on 07/08/2019. Cycle # 73 FOLFIRI + Avastin was on 07/30/2019. CEA 5.0 on 07/29/2019. Cycle # 74 FOLFIRI + Avastin was on 08/13/2019. CEA 5.0 on 08/12/2019. CT chest 08/20/2019 negative  CT abdomen/pelvis 08/20/2019 Previous identified hepatic lesions are not visualized on the current exam.  Continue FOLFIRI + Avastin and repeat scans in 3 months. Cycle # 75 FOLFIRI + Avastin was on 08/27/2019. CEA 5.0 on 08/26/2019. Cycle # 76 FOLFIRI + Avastin was on 09/17/2019. CEA 5.5 on 09/16/2019. Cycle # 77 FOLFIRI + Avastin was on 10/15/2019. CEA 4.6 on 10/15/2019. Cycle # 78 FOLFIRI + Avastin was on 10/29/2019. CEA 4.1 on 10/28/2019. Cycle # 79 FOLFIRI + Avastin was on 11/12/2019. CEA 4.3 on 11/12/2019. Cycle # 80 FOLFIRI + Avastin was on 12/10/2019. CEA 4.0 on 12/09/2019. CT chest 12/19/2019 Stable 3 mm nodule within the left upper lobe, similar to the previous studies dating back to 11/2/2018, however decreased in size compared to the CT from 3/26/2018. No thoracic LN. CT abdomen/pelvis 12/19/2019 Previously described small hypodense lesions are more conspicuous on the current study compared to the recent study throughout the liver from 8/20/2019, however similar to the prior study from 2/21/2019. Findings may be related to differences in contrast opacification. No abdominal or pelvic lymphadenopathy. Continue FOLFIRI + Avastin and repeat scans in 2 months to f/u on liver lesions. Cycle # 81 FOLFIRI + Avastin was on 01/07/2020. CEA 3.8 on 01/06/2020. Cycle # 82 FOLFIRI + Avastin was on 01/21/2020. CEA 3.7 on 01/20/2020. Cycle # 83 FOLFIRI + Avastin was on 02/04/2020. CEA 4.1 on 02/03/2020. Cycle # 84 FOLFIRI + Avastin was on 02/18/2020. CEA 4.6 on 02/17/2020. CT chest 2/28/2020 no evidence of metastatic disease to the lungs and no mediastinal or hilar adenopathy. CT abdomen pelvis 2/28/2020 no enhancing lesions seen within the liver to suggest metastatic disease. Wall thickening of the rectosigmoid junction. Images reviewed. Continue FOLFIRI Avastin and repeat scans in 3 months  EGD by Dr. Priscilla Burt 03/02/2020 showing no masses or lesions. Cycle # 85 FOLFIRI + Avastin was on 03/03/2020. CEA 7.4 on 03/02/2020. Cycle # 86 FOLFIRI + Avastin was on 03/17/2020. CEA 4.5 on 03/16/2020. Colonoscopy on 03/23/2020 by Dr. Priscilla Burt unremarkable (records requested). Cycle # 87 FOLFIRI + Avastin was on 03/31/2020. CEA 4.1 on 03/30/2020. Cycle # 88 FOLFIRI + Avastin was on 04/21/2020. CEA 6.4 on 04/20/2020. Cycle # 89 FOLFIRI + Avastin was on 05/05/2020. CEA 5.8 on 05/04/2020. Cycle # 90 FOLFIRI + Avastin was on 06/02/2020. CEA 5.5 on 06/01/2020. Cycle # 91 FOLFIRI + Avastin was on 06/16/2020. CEA 5.6 on 06/15/2020. CT chest 06/23/2020 noted no metastatic disease in the chest.   CT abdomen/pelvis 06/23/2020 Stable small liver lesions measuring up to 5 mm since 2019.   22 mm round mass in segment 8 of the liver with central high density stable from December 2019), previously measuring up to 34 mm in 2017. No additional metastatic disease in the abdomen or pelvis. Small amount of ascites. Overall stable disease. Continue FOLFIRI + Avastin and repeat scans in 2-3 months. Cycle # 92 FOLFIRI + Avastin was on 07/07/2020. CEA 5.7 on 07/06/2020. Cycle # 93 FOLFIRI + Avastin was on 07/21/2020. CEA 5.9 on 07/20/2020. Cycle # 94 FOLFIRI + Avastin was on 08/04/2020. CEA 5.5 on 08/04/2020. Cycle # 95 FOLFIRI + Avastin was on 08/25/2020. CEA 6.1 on 08/24/2020. CT chest 9/2/2020 stable unremarkable enhanced CT of the thorax. There is no metastatic disease to the lungs. CT abdomen pelvis on 9/2/2020 stable 2.2 cm low attenuated lesion within the central aspect of the right hepatic lobe favored to represent treated metastatic disease. No new hepatic lesion is identified. Stable splenomegaly  There is no appreciable colonic lesion or stricture. Pericholecystic fluid of uncertain etiology. Laparoscopic cholecystectomy with normal cholangiogram 10/27/20  Gallbladder: Chronic cholecystitis and cholelithiasis.  Unremarkable regional lymph node. 11/13/2020; Seen by Dr. Comfort Jamil from General surgery team; cleared to re-start chemotherapy. Cycle # 96 FOLFIRI + Avastin was on 11/17/2020. CEA 3.6 on 11/16/2020. Cycle # 97 FOLFIRI + Avastin was on 12/01/2020. CEA 3.5 on 11/30/2020. Cycle # 98 FOLFIRI + Avastin was on 12/15/2020. CEA 4.3 on 12/15/2020. Cycle # 99 FOLFIRI + Avastin was on 01/05/2021. CEA 4.7 on 01/04/2021. CT chest 01/13/2021 negative for metastatic disease. CT abdomen/pelvis 01/13/2021 Unchanged central hepatic lesion. No specific signs of abdominopelvic metastatic disease. Continue FOLFIRI + Avastin and repeat scans in 3 months. Cycle # 100 FOLFIRI + Avastin was on 01/19/2021. CEA 4.7 on 01/18/2021. Cycle # 101 FOLFIRI + Avastin is today 02/02/2021. CEA 5.2 on 02/01/2021. RTC 2 weeks for Cycle # 102 FOLFIRI + Avastin.    MSI testing noted no mismatch repair protein loss of expression    2/2/2021  Manuel Das MD  Board Certified Medical Oncologist

## 2021-02-04 ENCOUNTER — HOSPITAL ENCOUNTER (OUTPATIENT)
Dept: INFUSION THERAPY | Age: 71
Discharge: HOME | End: 2021-02-04
Payer: MEDICARE

## 2021-02-04 DIAGNOSIS — C78.7 RECTAL CANCER METASTASIZED TO LIVER (HCC): ICD-10-CM

## 2021-02-04 DIAGNOSIS — Z51.11 ENCOUNTER FOR ANTINEOPLASTIC CHEMOTHERAPY: Primary | ICD-10-CM

## 2021-02-04 DIAGNOSIS — C20 RECTAL CANCER METASTASIZED TO LIVER (HCC): ICD-10-CM

## 2021-02-04 PROCEDURE — 96372 THER/PROPH/DIAG INJ SC/IM: CPT

## 2021-02-04 PROCEDURE — 6360000002 HC RX W HCPCS: Performed by: INTERNAL MEDICINE

## 2021-02-04 RX ADMIN — PEGFILGRASTIM-CBQV 6 MG: 6 INJECTION, SOLUTION SUBCUTANEOUS at 14:31

## 2021-02-15 ENCOUNTER — HOSPITAL ENCOUNTER (OUTPATIENT)
Age: 71
Discharge: HOME | End: 2021-02-15
Payer: MEDICARE

## 2021-02-15 LAB
ALBUMIN SERPL-MCNC: 3.9 G/DL (ref 3.5–5.2)
ALP BLD-CCNC: 166 U/L (ref 40–129)
ALT SERPL-CCNC: 15 U/L (ref 0–40)
ANION GAP SERPL CALCULATED.3IONS-SCNC: 13 MMOL/L (ref 7–16)
ANISOCYTOSIS: ABNORMAL
AST SERPL-CCNC: 23 U/L (ref 0–39)
BASOPHILS ABSOLUTE: 0.02 E9/L (ref 0–0.2)
BASOPHILS RELATIVE PERCENT: 0.9 % (ref 0–2)
BILIRUB SERPL-MCNC: 0.8 MG/DL (ref 0–1.2)
BUN BLDV-MCNC: 24 MG/DL (ref 8–23)
CALCIUM SERPL-MCNC: 8.8 MG/DL (ref 8.6–10.2)
CEA: 5.6 NG/ML (ref 0–5.2)
CHLORIDE BLD-SCNC: 102 MMOL/L (ref 98–107)
CO2: 25 MMOL/L (ref 22–29)
CREAT SERPL-MCNC: 1.4 MG/DL (ref 0.7–1.2)
EOSINOPHILS ABSOLUTE: 0.06 E9/L (ref 0.05–0.5)
EOSINOPHILS RELATIVE PERCENT: 2.8 % (ref 0–6)
FERRITIN: 319 NG/ML
GFR AFRICAN AMERICAN: >60
GFR NON-AFRICAN AMERICAN: 50 ML/MIN/1.73
GLUCOSE BLD-MCNC: 105 MG/DL (ref 74–99)
HCT VFR BLD CALC: 25.6 % (ref 37–54)
HCT VFR BLD CALC: 25.7 % (ref 37–54)
HEMOGLOBIN: 8 G/DL (ref 12.5–16.5)
HYPOCHROMIA: ABNORMAL
IMMATURE GRANULOCYTES #: 0.03 E9/L
IMMATURE GRANULOCYTES %: 1.4 % (ref 0–5)
IMMATURE RETIC FRACT: 26.5 % (ref 2.3–13.4)
IRON SATURATION: 25 % (ref 20–55)
IRON: 40 MCG/DL (ref 59–158)
LYMPHOCYTES ABSOLUTE: 0.39 E9/L (ref 1.5–4)
LYMPHOCYTES RELATIVE PERCENT: 18.3 % (ref 20–42)
MCH RBC QN AUTO: 30.1 PG (ref 26–35)
MCHC RBC AUTO-ENTMCNC: 31.1 % (ref 32–34.5)
MCV RBC AUTO: 96.6 FL (ref 80–99.9)
MONOCYTES ABSOLUTE: 0.21 E9/L (ref 0.1–0.95)
MONOCYTES RELATIVE PERCENT: 9.9 % (ref 2–12)
NEUTROPHILS ABSOLUTE: 1.42 E9/L (ref 1.8–7.3)
NEUTROPHILS RELATIVE PERCENT: 66.7 % (ref 43–80)
PDW BLD-RTO: 19.1 FL (ref 11.5–15)
PLATELET # BLD: 127 E9/L (ref 130–450)
PMV BLD AUTO: 12.2 FL (ref 7–12)
POIKILOCYTES: ABNORMAL
POLYCHROMASIA: ABNORMAL
POTASSIUM SERPL-SCNC: 3.8 MMOL/L (ref 3.5–5)
RBC # BLD: 2.66 E12/L (ref 3.8–5.8)
RETIC HGB EQUIVALENT: 28.4 PG (ref 28.2–36.6)
RETICULOCYTE ABSOLUTE COUNT: 0.15 E12/L
RETICULOCYTE COUNT PCT: 5.5 % (ref 0.4–1.9)
SODIUM BLD-SCNC: 140 MMOL/L (ref 132–146)
TEAR DROP CELLS: ABNORMAL
TOTAL IRON BINDING CAPACITY: 163 MCG/DL (ref 250–450)
TOTAL PROTEIN: 6.6 G/DL (ref 6.4–8.3)
WBC # BLD: 2.1 E9/L (ref 4.5–11.5)

## 2021-02-15 PROCEDURE — 80053 COMPREHEN METABOLIC PANEL: CPT

## 2021-02-15 PROCEDURE — 82607 VITAMIN B-12: CPT

## 2021-02-15 PROCEDURE — 82746 ASSAY OF FOLIC ACID SERUM: CPT

## 2021-02-15 PROCEDURE — 36415 COLL VENOUS BLD VENIPUNCTURE: CPT

## 2021-02-15 PROCEDURE — 83550 IRON BINDING TEST: CPT

## 2021-02-15 PROCEDURE — 85025 COMPLETE CBC W/AUTO DIFF WBC: CPT

## 2021-02-15 PROCEDURE — 83540 ASSAY OF IRON: CPT

## 2021-02-15 PROCEDURE — 82378 CARCINOEMBRYONIC ANTIGEN: CPT

## 2021-02-15 PROCEDURE — 85045 AUTOMATED RETICULOCYTE COUNT: CPT

## 2021-02-15 PROCEDURE — 82728 ASSAY OF FERRITIN: CPT

## 2021-02-16 ENCOUNTER — HOSPITAL ENCOUNTER (OUTPATIENT)
Dept: INFUSION THERAPY | Age: 71
Discharge: HOME | End: 2021-02-16
Payer: MEDICARE

## 2021-02-16 ENCOUNTER — OFFICE VISIT (OUTPATIENT)
Dept: ONCOLOGY | Age: 71
End: 2021-02-16
Payer: MEDICARE

## 2021-02-16 VITALS
SYSTOLIC BLOOD PRESSURE: 121 MMHG | HEIGHT: 73 IN | WEIGHT: 186.9 LBS | DIASTOLIC BLOOD PRESSURE: 65 MMHG | BODY MASS INDEX: 24.77 KG/M2 | HEART RATE: 68 BPM | OXYGEN SATURATION: 99 % | RESPIRATION RATE: 18 BRPM | TEMPERATURE: 97.5 F

## 2021-02-16 VITALS — HEART RATE: 65 BPM | SYSTOLIC BLOOD PRESSURE: 115 MMHG | DIASTOLIC BLOOD PRESSURE: 69 MMHG

## 2021-02-16 DIAGNOSIS — C78.7 RECTAL CANCER METASTASIZED TO LIVER (HCC): Primary | ICD-10-CM

## 2021-02-16 DIAGNOSIS — C20 RECTAL CANCER (HCC): Primary | ICD-10-CM

## 2021-02-16 DIAGNOSIS — C20 RECTAL CANCER METASTASIZED TO LIVER (HCC): Primary | ICD-10-CM

## 2021-02-16 LAB
FOLATE: >20 NG/ML (ref 4.8–24.2)
VITAMIN B-12: >2000 PG/ML (ref 211–946)

## 2021-02-16 PROCEDURE — 96411 CHEMO IV PUSH ADDL DRUG: CPT

## 2021-02-16 PROCEDURE — 2580000003 HC RX 258: Performed by: INTERNAL MEDICINE

## 2021-02-16 PROCEDURE — 96368 THER/DIAG CONCURRENT INF: CPT

## 2021-02-16 PROCEDURE — 6360000002 HC RX W HCPCS: Performed by: INTERNAL MEDICINE

## 2021-02-16 PROCEDURE — 96415 CHEMO IV INFUSION ADDL HR: CPT

## 2021-02-16 PROCEDURE — 96417 CHEMO IV INFUS EACH ADDL SEQ: CPT

## 2021-02-16 PROCEDURE — 6360000002 HC RX W HCPCS

## 2021-02-16 PROCEDURE — 96375 TX/PRO/DX INJ NEW DRUG ADDON: CPT

## 2021-02-16 PROCEDURE — 99214 OFFICE O/P EST MOD 30 MIN: CPT | Performed by: INTERNAL MEDICINE

## 2021-02-16 PROCEDURE — 96413 CHEMO IV INFUSION 1 HR: CPT

## 2021-02-16 RX ORDER — ATROPINE SULFATE 0.4 MG/ML
0.4 AMPUL (ML) INJECTION ONCE
Status: CANCELLED | OUTPATIENT
Start: 2021-02-16 | End: 2021-02-16

## 2021-02-16 RX ORDER — DEXAMETHASONE SODIUM PHOSPHATE 10 MG/ML
10 INJECTION INTRAMUSCULAR; INTRAVENOUS ONCE
Status: COMPLETED | OUTPATIENT
Start: 2021-02-16 | End: 2021-02-16

## 2021-02-16 RX ORDER — PALONOSETRON HYDROCHLORIDE 0.05 MG/ML
0.25 INJECTION, SOLUTION INTRAVENOUS ONCE
Status: COMPLETED | OUTPATIENT
Start: 2021-02-16 | End: 2021-02-16

## 2021-02-16 RX ORDER — DEXAMETHASONE SODIUM PHOSPHATE 10 MG/ML
INJECTION INTRAMUSCULAR; INTRAVENOUS
Status: COMPLETED
Start: 2021-02-16 | End: 2021-02-16

## 2021-02-16 RX ORDER — SODIUM CHLORIDE 0.9 % (FLUSH) 0.9 %
10 SYRINGE (ML) INJECTION PRN
Status: DISCONTINUED | OUTPATIENT
Start: 2021-02-16 | End: 2021-02-17 | Stop reason: HOSPADM

## 2021-02-16 RX ORDER — 0.9 % SODIUM CHLORIDE 0.9 %
10 VIAL (ML) INJECTION ONCE
Status: CANCELLED | OUTPATIENT
Start: 2021-02-16 | End: 2021-02-16

## 2021-02-16 RX ORDER — SODIUM CHLORIDE 9 MG/ML
250 INJECTION, SOLUTION INTRAVENOUS CONTINUOUS
Status: DISCONTINUED | OUTPATIENT
Start: 2021-02-16 | End: 2021-02-17 | Stop reason: HOSPADM

## 2021-02-16 RX ORDER — SODIUM CHLORIDE 0.9 % (FLUSH) 0.9 %
10 SYRINGE (ML) INJECTION PRN
Status: CANCELLED | OUTPATIENT
Start: 2021-02-16

## 2021-02-16 RX ORDER — DIPHENHYDRAMINE HYDROCHLORIDE 50 MG/ML
50 INJECTION INTRAMUSCULAR; INTRAVENOUS ONCE
Status: CANCELLED | OUTPATIENT
Start: 2021-02-16 | End: 2021-02-16

## 2021-02-16 RX ORDER — PALONOSETRON HYDROCHLORIDE 0.05 MG/ML
0.25 INJECTION, SOLUTION INTRAVENOUS ONCE
Status: CANCELLED | OUTPATIENT
Start: 2021-02-16

## 2021-02-16 RX ORDER — DEXAMETHASONE SODIUM PHOSPHATE 10 MG/ML
10 INJECTION, SOLUTION INTRAMUSCULAR; INTRAVENOUS ONCE
Status: CANCELLED | OUTPATIENT
Start: 2021-02-16

## 2021-02-16 RX ORDER — EPINEPHRINE 1 MG/ML
0.3 INJECTION, SOLUTION, CONCENTRATE INTRAVENOUS PRN
Status: CANCELLED | OUTPATIENT
Start: 2021-02-16

## 2021-02-16 RX ORDER — PALONOSETRON HYDROCHLORIDE 0.05 MG/ML
INJECTION, SOLUTION INTRAVENOUS
Status: COMPLETED
Start: 2021-02-16 | End: 2021-02-16

## 2021-02-16 RX ORDER — FLUOROURACIL 50 MG/ML
850 INJECTION, SOLUTION INTRAVENOUS ONCE
Status: CANCELLED | OUTPATIENT
Start: 2021-02-16

## 2021-02-16 RX ORDER — FLUOROURACIL 50 MG/ML
850 INJECTION, SOLUTION INTRAVENOUS ONCE
Status: COMPLETED | OUTPATIENT
Start: 2021-02-16 | End: 2021-02-16

## 2021-02-16 RX ORDER — HEPARIN SODIUM (PORCINE) LOCK FLUSH IV SOLN 100 UNIT/ML 100 UNIT/ML
500 SOLUTION INTRAVENOUS PRN
Status: CANCELLED | OUTPATIENT
Start: 2021-02-16

## 2021-02-16 RX ORDER — HEPARIN SODIUM (PORCINE) LOCK FLUSH IV SOLN 100 UNIT/ML 100 UNIT/ML
500 SOLUTION INTRAVENOUS PRN
Status: DISCONTINUED | OUTPATIENT
Start: 2021-02-16 | End: 2021-02-17 | Stop reason: HOSPADM

## 2021-02-16 RX ORDER — ATROPINE SULFATE 0.4 MG/ML
0.4 AMPUL (ML) INJECTION ONCE
Status: COMPLETED | OUTPATIENT
Start: 2021-02-16 | End: 2021-02-16

## 2021-02-16 RX ORDER — SODIUM CHLORIDE 9 MG/ML
250 INJECTION, SOLUTION INTRAVENOUS CONTINUOUS
Status: CANCELLED | OUTPATIENT
Start: 2021-02-16 | End: 2021-03-06

## 2021-02-16 RX ORDER — ATROPINE SULFATE 0.4 MG/ML
AMPUL (ML) INJECTION
Status: COMPLETED
Start: 2021-02-16 | End: 2021-02-16

## 2021-02-16 RX ORDER — METHYLPREDNISOLONE SODIUM SUCCINATE 125 MG/2ML
125 INJECTION, POWDER, LYOPHILIZED, FOR SOLUTION INTRAMUSCULAR; INTRAVENOUS ONCE
Status: CANCELLED | OUTPATIENT
Start: 2021-02-16 | End: 2021-02-16

## 2021-02-16 RX ORDER — SODIUM CHLORIDE 9 MG/ML
INJECTION, SOLUTION INTRAVENOUS CONTINUOUS
Status: CANCELLED | OUTPATIENT
Start: 2021-02-16

## 2021-02-16 RX ADMIN — LEUCOVORIN CALCIUM 850 MG: 10 INJECTION INTRAMUSCULAR; INTRAVENOUS at 10:46

## 2021-02-16 RX ADMIN — PALONOSETRON HYDROCHLORIDE 0.25 MG: 0.05 INJECTION, SOLUTION INTRAVENOUS at 09:26

## 2021-02-16 RX ADMIN — SODIUM CHLORIDE, PRESERVATIVE FREE 10 ML: 5 INJECTION INTRAVENOUS at 09:20

## 2021-02-16 RX ADMIN — DEXAMETHASONE SODIUM PHOSPHATE 10 MG: 10 INJECTION INTRAMUSCULAR; INTRAVENOUS at 09:32

## 2021-02-16 RX ADMIN — ATROPINE SULFATE 0.4 MG: 0.4 INJECTION, SOLUTION INTRAMUSCULAR; INTRAVENOUS; SUBCUTANEOUS at 09:29

## 2021-02-16 RX ADMIN — PALONOSETRON 0.25 MG: 0.25 INJECTION, SOLUTION INTRAVENOUS at 09:26

## 2021-02-16 RX ADMIN — FLUOROURACIL 850 MG: 50 INJECTION, SOLUTION INTRAVENOUS at 12:36

## 2021-02-16 RX ADMIN — SODIUM CHLORIDE 250 ML: 9 INJECTION, SOLUTION INTRAVENOUS at 09:25

## 2021-02-16 RX ADMIN — Medication 0.4 MG: at 09:29

## 2021-02-16 RX ADMIN — BEVACIZUMAB 400 MG: 400 INJECTION, SOLUTION INTRAVENOUS at 09:55

## 2021-02-16 RX ADMIN — SODIUM CHLORIDE 400 MG: 9 INJECTION, SOLUTION INTRAVENOUS at 10:47

## 2021-02-16 NOTE — PROGRESS NOTES
[place] [id number]  Attending Clinic Note     Reason for Visit: Follow-up on a patient with Metastatic Rectal Cancer.     PCP: Lazarus Bacca, MD     History of Present Illness:  68 y/o  male who was referred to see Dr. Roel Boothe (GI team) for evaluation of bright red blood per rectum, mild anemia and change in bowel habits with diarrhea. CEA 2640 on 10/19/2015. AlcP 299 AST 57 ALT 75 on 10/19/2015. Colonoscopy in 2013 noted no significant polyps, colitis or lesions at that time. Denies any Family History of colorectal cancer or polyps.     Colonoscopy on 10/19/2015 revealed:  1. Ascending polyp, 8 mm, hot snare: Tubulovillous adenoma. 2. Transverse polyp, 1 cm, hot snare: Serrated polyp most consistent with sessile serrated adenoma. 3. Five splenic flexure polyps, three - 5 mm, 7 mm, 8 mm, hot snare and biopsy: Four segments of Tubular Adenoma  4. Descending polyp, 5 mm, biopsy: Tubular adenoma. 5. Sigmoid polyp, 4 mm biopsy, Serrated polyp most consistent with sessile serrated adenoma. 6. Two rectal polyps, 4 mm biopsy: Serrated polyp most consistent with hyperplastic polyp. 7. Rectosigmoid colon mass (large mass approximately 65% circumference of the lumen; Very friable, firm and hard): Tubulovillous adenoma with associated focal erosion and fibroplasia.      CT scan abdomen/pelvis on 10/26/2015:  1. Small nodules at lung bases likely represent metastatic colon   cancer. 2. Extensive likely metastatic colon cancer throughout the liver.      3. Mild mural thickening and luminal narrowing in the   terminal ileum, otherwise nonspecific.      Colonoscopy with snare removal rectal mass was performed by Dr. Sonny Soria. Pathology proved:  Rectal polyp: Invasive adenocarcinoma involving villous adenoma and extending to the cauterized edge of excision. KRAS Mutation: Mutation detected.   BRAF Mutation: Mutation not detected. 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     CEA 3488. Bone scan on 11/10/2015 noted no metastatic disease. CT chest on 11/10/2015 revealed Multiple pulmonary nodules in the upper and lower lobes consistent with metastatic disease;   Hepatic metastasis also visualized. 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 Mediport was placed. Cycle # 1 of FOLFOX + Avastin was on 11/23/2015. CEA was 4555 on 11/23/2015. Cycle # 2 of FOLFOX + Avastin was on 12/08/2015. CEA was 3160 on 12/08/2015. Cycle # 3 of FOLFOX + Avastin was on 12/22/2015. CEA was 2516 on 12/21/2015. Cycle # 4 of FOLFOX + Avastin was on 01/05/2016. CEA was 2098 on 01/05/2015. Cycle # 5 of FOLFOX + Avastin was on 01/19/2016. CEA was 1511 on 01/05/2015.     -Bone scan on 01/26/2016 noted no metastatic disease. CT chest on 01/26/2016 revealed Significant response to treatment with no visible residual nodules. CT scan abdomen/pelvis on 01/26/2016 noted Interval decreased size of multiple masses in the liver compatible with treatment response. Continue another 2 months of FOLFOX + Avastin and repeat scans. Cycle # 6 of FOLFOX + Avastin was on 02/02/2016.  on 02/02/2016. Cycle # 7 of FOLFOX + Avastin was on 02/16/2016.  on 02/16/2016. Cycle # 8 of FOLFOX + Avastin was on 03/01/2016.  on 03/01/2016. Cycle # 9 of FOLFOX + Avastin was on 03/15/2016.  on 03/15/2016. Cycle # 10 of FOLFOX + Avastin was on 03/29/2016. .4 on 03/29/2016. Cycle # 11 of FOLFOX + Avastin was on 04/12/2016. .4 on 04/12/2016.     Re-staging scans on 04/19/2016: CT Chest: clear lungs; no evidence of recurring pulmonary nodule; CT Abdomen/Pelvis: Further interval decrease in size of the multiple metastatic hepatic lesions, the largest lesion now measures 3.9 x 3.5 cm and previously measured 4.5 cm in maximum diameter.  No mesenteric lymphadenopathy is identified; Bone Scan: No evidence of osseous metastasis. Continue same regimen and re-stage in 2-3 months. Cycle # 12 FOLFOX + Avastin was on 04/26/2016. .5 on 04/26/2016. Cycle # 13 FOLFOX + Avastin was on 05/10/2016. .3 on 05/10/2016. Cycle # 14 FOLFOX+AVASTIN was on 05/24/2016. .5 on 05/24/2016. Cycle # 15 FOLFOX + Avastin was on 06/07/2016. CEA 90.5 on 06/07/2016. Ad[facility] to [facility]/13/2016-06/16/2016 for abdominal pain: EGD noted 1.5 cm clean based duodenal bulb ulceration s/p epinephrine and bicap per Dr. Daniel Garsia. No active bleeding. A. Stomach, biopsy: Mild chronic gastritis, immunostain negative for Helicobacter  B. Esophagus, biopsy: Gastric glandular mucosa with prominent intestinal metaplasia ([place]'s epithelium), negative for epithelial dysplasia, esophageal squamous mucosa not identified  Cycle # 16 FOLFOX (discontinued avastin (bevacizumab) given association of peptic ulcer disease and known association of GI perforation) was on 06/21/2016. Cycle # 17 FOLFOX was on 07/05/2016. CEA 52.6 on 07/19/2016. Cycle # 17 FOLFOX was on 07/05/2016. CEA 52.6 on 07/05/2016. CEA 47.6 on 07/19/2016.     Re-staging scans 07/19/2106: CT Chest negative for metastatic disease. CT Abdomen/Pelvis: Stable hepatic lesions; Question new mural thickening in the cecum. Bone Scan: New Let hip lesion suspicious for bone metastasis.     Increased CEA; new mural thickening in the cecum and new left hip lesion suspicious for bone metastasis are consistent with disease progression; He derived maximum benefit from FOLFOX/AVASTIN. D/C FOLFOX/AVASTIN. We recommended FOLFIRI second line therapy. Cycle # 1 FOLFIRI was on 08/02/2016. CEA 40.8 on 08/02/2016. Xgeva q4 weeks started on 08/02/2016. Cycle # 2 FOLFIRI was on 08/16/2016. CEA 31.7 on 08/16/2016. Cycle # 3 FOLFIRI (added Avastin) was on 08/30/2016 given that ulcers healed on EGD 08/15/2016 by Dr. Payton Elise; Protonix bid since avastin re-started.    Colonoscopy on 08/29/2016 (to look at the cecal area) unremarkable. CEA 26.7 on 08/30/2016. Cycle # 4 FOLFIRI/Avastin was on 09/13/2016. CEA 23.4 on 09/13/2016. Cycle # 5 FOLFIRI/Avastin was on 09/27/2016. CEA 22.3 on 09/27/2016. Cycle # 6 FOLFIRI/Avastin was on 10/11/2016. CEA 18.4 on 10/11/2016.      Bone scan 10/21/2016 stable bone metastasis; ? New lesion anterior left sixth rib likely interval healing fracture. CT abdomen/pelvis revealed stable hepatic metastasis. CT chest negative for metastatic disease. Continue FOLFIRI/Avastin and repeat scans in 3 months. Cycle # 7 FOLFIRI/Avastin was on 10/25/2016. CEA 16.1  Cycle # 8 FOLFIRI/Avastin was on 11/08/2016. CEA 15.7  Cycle # 9 FOLFIRI/Avastin was on 11/29/2016. CEA 13.6 on 11/29/2016. Cycle # 10 FOLFIRI/Avastin was on 12/13/2016. CEA 10.6 on 12/13/2016. Cycle # 11 FOLFIRI/Avastin was on 12/27/2016. CEA 11.1 on 12/27/2016. Cycle # 12 FOLFIRI/Avastin was on 01/10/2017. CEA 9.7 on 01/10/2017.       CT chest 01/23/2017 No new pulmonary nodule or lymphadenopathy. Patchy groundglass opacities within the left lower lobe, suggestive of infectious or inflammatory etiology. Followup CT chest in 3 months. Slight increased linear sclerosis along the left anterior sixth rib corresponding to an area of increased uptake on the prior bone scan from 10/21/2016, favored to be related to interval healing changes of a nondisplaced fracture. CT abdomen/pelvis on 01/23/2017 Redemonstration of multiple hepatic metastases, which are similar compared to the prior CT from 10/21/2016. Redemonstration of area of increased sclerosis along the right acetabulum suspicious for metastatic disease. Bone scan on 01/23/2017 Stable subtle uptake within the left proximal diaphysis, again suggestive of metastatic disease  Decreased subtle uptake within the medial right acetabulum.    Decreased uptake within the left anterior sixth rib corresponding to linear sclerosis on the recent CT, favored to be related to an joint rib fracture. Continue FOLFIRI + Avastin and repeat scans in 3 months. Cycle # 13 FOLFIRI + Avastin was on 01/24/2017. Cycle # 14 FOLFIRI + Avastin was on 02/07/2017. Cycle # 15 FOLFIRI + Avastin was on 02/21/2017. Cycle # 16 FOLFIRI + Avastin was on 03/07/2017. Cycle # 17 FOLFIRI + Avastin was on 03/21/2017. Cycle # 18 FOLFIRI + Avastin was on 04/04/2017. CT chest 04/17/2017 negative for metastatic disease. CT abdomen/pelvis 04/17/2017 Stable hypodense metastatic lesions within the liver. Stable area of increased sclerosis along the right acetabulum  Bone scan 04/17/2017 Stable subtle uptake within the left proximal femoral diaphysis; No definite abnormal uptake in the region of a sclerotic lesion along the posterior column of the right acetabulum noted on CT. Stable slight uptake within the left anterior sixth rib corresponding to linear sclerosis on the recent CT, favored to be related to a fracture. Continue FOLFIRI + Avastin and repeat scans in 3 months. Cycle # 19 FOLFIRI + Avastin was on 04/18/2017. Cycle # 20 FOLFIRI + Avastin was on 05/02/2017. Cycle # 21 FOLFIRI + Avastin was on 05/16/2017. Cycle # 22 FOLFIRI + Avastin was on 05/30/2017. Cycle # 23 FOLFIRI + Avastin was on 06/13/2017. Cycle # 24 FOLFIRI + Avastin was on 06/27/2017. Cycle # 25 FOLFIRI + Avastin was on 07/11/2017. Cycle # 26 FOLFIRI + Avastin was on 07/25/2017. Cycle # 27 FOLFIRI + Avastin was on 08/08/2017. Cycle # 28 FOLFIRI + Avastin was on 08/22/2017. Cycle # 29 FOLFIRI + Avastin was on 09/05/2017. Cycle # 30 FOLFIRI + Avastin was on 09/19/2017. Bone scan 09/26/2017 stable. CT abdomen/pelvis on 09/26/2017 Stable hypodense mass in the liver. Stable sclerotic lesion in the acetabulum. CT chest 09/26/2017 negative for metastatic disease. Cycle # 31 FOLFIRI + Avastin was on 10/03/2017. CEA 7.4 on 10/03/2017. Cycle # 32 FOLFIRI + Avastin was on 10/17/2017.  CEA 6.0 on 10/17/2017. Cycle # 33 FOLFIRI + Avastin was on 10/31/2017. CEA 6.8 on 10/31/2017. Cycle # 34 FOLFIRI + Avastin was on 11/14/2017. CEA 7.2 on 11/14/2017. Cycle # 35 FOLFIRI + Avastin was on 11/28/2017. CEA 5.1 on 11/28/2017. Cycle # 36 FOLFIRI + Avastin was on 12/12/2017. CEA 6.1 on 12/12/2017. Bone scan 12/22/2017 noted no evidence of metastatic disease to the axial or appendicular skeleton. CT chest 12/22/2017 noted no evidence of metastatic disease. CT abdomen/pelvis 12/22/2017 noted stable hypodense lesions in the liver. Continue FOLFIRI + Avastin and repeat scans in 3 months. Cycle # 37 FOLFIRI + Avastin was on 12/27/2018. Cycle # 38 FOLFIRI + Avastin was on 01/09/2018. Cycle # 39 FOLFIRI + Avastin was on 01/23/2018. Cycle # 40 FOLFIRI + Avastin was on 02/06/2018. Cycle # 41 FOLFIRI + Avastin was on 02/20/2018. Cycle # 42 FOLFIRI + Avastin was on 03/06/2018. Cycle # 43 FOLFIRI + Avastin was on 03/20/2018. Bone scan on 03/26/2018 negative for metastatic disease. CT chest 03/26/2018 noted ? new 7 mm nodule in LUCY. CT abdomen/pelvis 03/26/2018 noted stable hypodense lesions in the liver. ? New small ascites in the abdomen. Patient wants to continue to monitor the lung finding and repeat scans in 2 months instead of changing the current regimen into oral Lonsurf. Cycle # 44 FOLFIRI + Avastin was on 04/03/2018. CEA 6.3 on 04/03/2018. Cycle # 45 FOLFIRI + Avastin was on 04/24/2018. CEA 5.3 on 04/24/2018. Cycle # 46 FOLFIRI + Avastin was on 05/08/2018. CEA 5.4 on 05/08/2018. Cycle # 47 FOLFIRI + Avastin was on 05/22/2018. CEA 6.1 on 05/22/2018. CT chest 05/31/2018 noted stable nodule in LUCY. No evidence of worsening malignancy. CT abdomen/pelvis on 05/31/2018 noted stable liver metastasis. No evidence of worsening malignancy. Continue FOLFIRI + Avastin and repeat scans in 3 months. Cycle # 48 FOLFIRI + Avastin was on 06/06/2018. Cycle # 49 FOLFIRI + Avastin was on 06/19/2018. Cycle # 50 FOLFIRI + Avastin was on 07/03/2018. Cycle # 51 FOLFIRI + Avastin was on 07/24/2018. Cycle # 52 FOLFIRI + Avastin was on 08/14/2018. Cycle # 53 FOLFIRI + Avastin was on 08/28/2018. CT chest 09/06/2018 noted interval decrease in size of LUCY measuring up to 4 mm, suggestive of treatment response. No mediastinal or hilar LN  CT abdomen/pelvis 09/06/2018 Slight interval increase in size of a previously noted metastatic lesion within the right hepatic lobe. This may be related to differences in phase of enhancement compared to the most recent study from 5/31/2018, the lesion remains decreased in size compared to the more previous studies. No abdominal, retroperitoneal, or pelvic lymphadenopathy. Continue FOLFIRI + Avastin and repeat scans in 2 months. Cycle # 54 FOLFIRI + Avastin was on 09/11/2018. Cycle # 55 FOLFIRI + Avastin was on 09/25/2018. Cycle # 56 FOLFIRI + Avastin was on 10/09/2018. Cycle # 57 FOLFIRI + Avastin was on 10/23/2018. CT chest 11/02/2018 stable 3 mm nodule within LUCY. No new right and left lung nodule. No mediastinal or osseous lesion. CT abdomen/pelvis 11/02/2018 stable metastatic disease to liver. No new metastatic disease identified. No mesenteric or retroperitoneal LN. No gross colonic lesion identified. Continue FOLFIRI + Avastin and repeat scans in 3 months. Cycle # 58 FOLFIRI + Avastin was on 11/06/2018. CEA 7.6 on 11/05/2018. Cycle # 59 FOLFIRI + Avastin was on 11/27/2018. CEA 6.9 on 11/26/2018. Cycle # 60 FOLFIRI + Avastin was on 12/11/2018. CEA 6.7 on 12/11/2018. Cycle # 61 FOLFIRI + Avastin was on 01/08/2019. CEA 5.6 on 01/07/2019. Cycle # 62 FOLFIRI + Avastin was on 01/29/2019. CEA 4.6 on 01/28/2019. Cycle # 63 FOLFIRI + Avastin was on 02/12/2019. CEA 4.3 on 02/11/2019. CT chest 02/21/2019 no evidence of metastatic disease. CT abdomen/pelvis 02/21/2019 stable hypodense liver lesions. No evidence of worsening metastatic disease.  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 Ordered MRI thoracic spine and referred to neurosurgery team.    Cycle # 64 FOLFIRI + Avastin was on 02/26/2019. CEA 4.7 on 02/25/2019. Cycle # 65 FOLFIRI + Avastin was on 03/12/2019. CEA 4.0 on 03/11/2019. Cycle # 66 FOLFIRI + Avastin was on 03/26/2019. CEA 4.7 on 03/25/2019. Cycle # 67 FOLFIRI + Avastin was on 04/09/2019. CEA 5.6 on 04/08/2019. Cycle # 68 FOLFIRI + Avastin was on 04/30/2019. CEA 4.9 on 04/29/2019. Cycle # 69 FOLFIRI + Avastin was on 05/14/2019. CEA 5.3 on 05/14/2019. CT chest 05/23/2019 stable small lung nodule with no evidence of metastatic disease. CT abdomen/pelvis 05/23/2019 Decrease conspicuity of the liver lesions. No new lesions seen. Stable splenomegaly. Continue FOLFIRI + Avastin and repeat scans in 3 months. Cycle # 70 FOLFIRI + Avastin was on 06/04/2019. CEA 4.8 on 06/03/2019. Cycle # 71 FOLFIRI + Avastin was on 06/18/2019. CEA 4.6 on 06/17/2019. Cycle # 72 FOLFIRI + Avastin was on 07/09/2019. CEA 4.3 on 07/08/2019. Cycle # 73 FOLFIRI + Avastin was on 07/30/2019. CEA 5.0 on 07/29/2019. Cycle # 74 FOLFIRI + Avastin was on 08/13/2019. CEA 5.0 on 08/12/2019. CT chest 08/20/2019 negative  CT abdomen/pelvis 08/20/2019 Previous identified hepatic lesions are not visualized on the current exam.  Continue FOLFIRI + Avastin and repeat scans in 3 months. Cycle # 75 FOLFIRI + Avastin was on 08/27/2019. CEA 5.0 on 08/26/2019. Cycle # 76 FOLFIRI + Avastin was on 09/17/2019. CEA 5.5 on 09/16/2019. Cycle # 77 FOLFIRI + Avastin was on 10/15/2019. CEA 4.6 on 10/15/2019. Cycle # 78 FOLFIRI + Avastin was on 10/29/2019. CEA 4.1 on 10/28/2019. Cycle # 79 FOLFIRI + Avastin was on 11/12/2019. CEA 4.3 on 11/12/2019. Cycle # 80 FOLFIRI + Avastin was on 12/10/2019. CEA 4.0 on 12/09/2019.   CT chest 12/19/2019 Stable 3 mm nodule within the left upper lobe, similar to the previous studies dating back to 11/2/2018, however decreased in size compared to the CT from 3/26/2018. No thoracic LN. CT abdomen/pelvis 12/19/2019 Previously described small hypodense lesions are more conspicuous on the current study compared to the recent study throughout the liver from 8/20/2019, however similar to the prior study from 2/21/2019. Findings may be related to differences in contrast opacification. No abdominal or pelvic lymphadenopathy. Continue FOLFIRI + Avastin and repeat scans in 2 months to f/u on liver lesions. Cycle # 81 FOLFIRI + Avastin was on 01/07/2020. CEA 3.8 on 01/06/2020. Cycle # 82 FOLFIRI + Avastin was on 01/21/2020. CEA 3.7 on 01/20/2020. Cycle # 83 FOLFIRI + Avastin was on 02/04/2020. CEA 4.1 on 02/03/2020. Cycle # 84 FOLFIRI + Avastin was on 02/18/2020. CEA 4.6 on 02/17/2020. EGD by Dr. Alessandro Florentino 03/02/2020 showing no masses or lesions  Cycle # 85 FOLFIRI + Avastin was on 03/03/2020. CEA 7.4 on 03/02/2020. Cycle # 86 FOLFIRI + Avastin was on 03/17/2020. CEA 4.5 on 03/16/2020. Colonoscopy on 03/23/2020 by Dr. Alessandro Florentino unremarkable (records requested). Cycle # 87 FOLFIRI + Avastin was on 03/31/2020. CEA 4.1 on 03/30/2020. Cycle # 88 FOLFIRI + Avastin was on 04/21/2020. CEA 6.4 on 04/20/2020. Cycle # 89 FOLFIRI + Avastin was on 05/05/2020. CEA 5.8 on 05/04/2020. Cycle # 90 FOLFIRI + Avastin was on 06/02/2020. CEA 5.5 on 06/01/2020. Cycle # 91 FOLFIRI + Avastin was on 06/16/2020. CEA 5.6 on 06/15/2020. CT chest 06/23/2020 noted no metastatic disease in the chest.   CT abdomen/pelvis 06/23/2020 Stable small liver lesions measuring up to 5 mm since 2019.   22 mm round mass in segment 8 of the liver with central high density stable from December 2019), previously measuring up to 34 mm in 2017. No additional metastatic disease in the abdomen or pelvis. Small amount of ascites. Overall stable disease. Continue FOLFIRI + Avastin and repeat scans in 2-3 months. Cycle # 92 FOLFIRI + Avastin was on 07/07/2020.  CEA 5.7 on 07/06/2020. Cycle # 93 FOLFIRI + Avastin was on 07/21/2020. CEA 5.9 on 07/20/2020. Cycle # 94 FOLFIRI + Avastin was on 08/04/2020. CEA 5.5 on 08/04/2020. Cycle # 95 FOLFIRI + Avastin was on 08/25/2020. CEA 6.1 on 08/24/2020. CT chest 9/2/2020 stable unremarkable enhanced CT of the thorax. There is no metastatic disease to the lungs. CT abdomen pelvis on 9/2/2020 stable 2.2 cm low attenuated lesion within the central aspect of the right hepatic lobe favored to represent treated metastatic disease. No new hepatic lesion is identified. Stable splenomegaly  There is no appreciable colonic lesion or stricture  Pericholecystic fluid of uncertain etiology. General surgery team consulted. Laparoscopic cholecystectomy with normal cholangiogram 10/27/20  Gallbladder: Chronic cholecystitis and cholelithiasis.  Unremarkable regional lymph node. 11/13/2020; Seen by Dr. Naveen Bravo from General surgery team; cleared to re-start chemotherapy. Cycle # 96 FOLFIRI + Avastin was on 11/17/2020. CEA 3.6 on 11/16/2020. Cycle # 97 FOLFIRI + Avastin was on 12/01/2020. CEA 3.5 on 11/30/2020. Cycle # 98 FOLFIRI + Avastin was on 12/15/2020. CEA 4.3 on 12/15/2020. Cycle # 99 FOLFIRI + Avastin was on 01/05/2021. CEA 4.7 on 01/04/2021. CT chest 01/13/2021 negative for metastatic disease. CT abdomen/pelvis 01/13/2021 Unchanged central hepatic lesion. No specific signs of abdominopelvic metastatic disease. Continue FOLFIRI + Avastin and repeat scans in 3 months. Cycle # 100 FOLFIRI + Avastin was on 01/19/2021. CEA 4.7 on 01/18/2021. Cycle # 101 FOLFIRI + Avastin was on 02/02/2021. CEA 5.2 on 02/01/2021. Cycle # 102 FOLFIRI + Avastin is today 02/16/2021. CEA 5.6 on 02/15/2021. Today 02/16/2021. No fever, chills. No chest pain or SOB. No nausea or vomiting. Intermittent  diarrhea relieved by Imodium prn. Review of Systems;  CONSTITUTIONAL: No fever, chills. Fair appetite and energy level.   ENMT: Eyes: No diplopia; Nose: No epistaxis. Mouth:No lesions  RESPIRATORY: No hemoptysis, shortness of breath. CARDIOVASCULAR: No chest pain, palpitations. GASTROINTESTINAL: No nausea/vomiting, abdominal pain. Intermittent diarrhea relieved by Imodium prn  GENITOURINARY: No dysuria, urinary frequency, hematuria. NEURO: No syncope, presyncope, headache. Remainder ROS: Negative. Past Medical History:   Past Medical History               Diagnosis Date    Arthritis      Cancer ([facility] [address]         colon    Depression      Hyperlipidemia      Hypertension           Medications:  Reviewed and reconciled.     Allergies: Allergies   Allergen Reactions    Neosporin [Neomycin-Polymyxin-Gramicidin] Rash    Tape [place]ja [place] Tape] Rash      Physical Exam:  /65   Pulse 68   Temp 97.5 °F (36.4 °C)   Resp 18   Ht 6' 1\" (1.854 m)   Wt 186 lb 14.4 oz (84.8 kg)   SpO2 99%   BMI 24.66 kg/m²   GENERAL: Alert, oriented x 3, not in acute distress  HEENT: PERRLA, EOMI. No oral lesions. NECK: Supple. Without lymphadenopathy. LUNGS: Lungs are CTA bilaterally, with no wheezing, crackles or rhonchi. CARDIOVASCULAR: Regular rhythm. No murmurs, rubs or gallops. ABDOMEN: Soft. Non-tender, non-distended. Positive bowel sounds. EXTREMITIES: Without clubbing, cyanosis or edema. NEUROLOGIC: No focal deficits.    ECOG PS 1    Diagnostics:  Lab Results   Component Value Date    WBC 2.1 (L) 02/15/2021    HGB 8.0 (L) 02/15/2021    HCT 25.6 (L) 02/15/2021    MCV 96.6 02/15/2021     (L) 02/15/2021     Lab Results   Component Value Date     02/15/2021    K 3.8 02/15/2021     02/15/2021    CO2 25 02/15/2021    BUN 24 (H) 02/15/2021    CREATININE 1.4 (H) 02/15/2021    GLUCOSE 105 (H) 02/15/2021    CALCIUM 8.8 02/15/2021    PROT 6.6 02/15/2021    LABALBU 3.9 02/15/2021    BILITOT 0.8 02/15/2021    ALKPHOS 166 (H) 02/15/2021    AST 23 02/15/2021    ALT 15 02/15/2021    LABGLOM 50 02/15/2021    GFRAA >60 02/15/2021     Lab Results Component Value Date    CEA 5.6 (H) 02/15/2021      Impression/Plan:  68 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 KRAS Mutation: Mutation detected. BRAF Mutation: Mutation not detected. 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 CT scan abdomen/pelvis on 10/26/2015 revealed small nodules at the lung bases, extensive liver lesions consistent with metastatic rectosigmoid cancer. CEA 3488 on 10/30/2015. Bone scan on 11/10/2015 noted no metastatic disease. CT chest on 11/10/2015 revealed Multiple pulmonary nodules in the upper and lower lobes consistent with metastatic disease; Hepatic metastasis also visualized. 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 Mediport was placed. Cycle # 1 of FOLFOX + Avastin was on 11/23/2015. CEA was 4555 on 11/23/2015. Cycle # 2 of FOLFOX + Avastin was on 12/08/2015. CEA was 3160 on 12/08/2015. Cycle # 3 of FOLFOX + Avastin was on 12/22/2015. CEA was 2516 on 12/21/2015. Cycle # 4 of FOLFOX + Avastin was on 01/05/2016. CEA was 2098 on 01/05/2015. Cycle # 5 of FOLFOX + Avastin was on 01/19/2016. CEA was 1511 on 01/05/2015.     -Bone scan on 01/26/2016 noted no metastatic disease. CT chest on 01/26/2016 revealed Significant response to treatment with no visible residual nodules. CT scan abdomen/pelvis on 01/26/2016 noted Interval decreased size of multiple masses in the liver compatible with treatment response. Continue another 2 months of FOLFOX + Avastin and repeat scans. Cycle # 6 of FOLFOX + Avastin was on 02/02/2016.  on 02/02/2016. Cycle # 7 of FOLFOX + Avastin was on 02/16/2016.  on 02/16/2016. Cycle # 8 of FOLFOX + Avastin was on 03/01/2016.  on 03/01/2016. Cycle # 9 of FOLFOX + Avastin was on 03/15/2016.  on 03/15/2016. Cycle # 10 of FOLFOX + Avastin was on 03/29/2016. .4 on 03/29/2016. Cycle # 11 of FOLFOX + Avastin was on 04/12/2016. .4 on 04/12/2016.     Re-staging scans on 04/19/2016: CT Chest: clear lungs; no evidence of recurring pulmonary nodule; CT Abdomen/Pelvis: Further interval decrease in size of the multiple metastatic hepatic lesions, the largest lesion now measures 3.9 x 3.5 cm and previously measured 4.5 cm in maximum diameter. No mesenteric lymphadenopathy is identified; Bone Scan: No evidence of osseous metastasis. Continue same regimen and re-stage in 2-3 months. Cycle # 12 FOLFOX + Avastin was on 04/26/2016. .5 on 04/26/2016. Cycle # 13 FOLFOX + Avastin was on 05/10/2016. .3 on 05/10/2016. Cycle # 14 FOLFOX+AVASTIN was on 05/24/2016. .5 on 05/24/2016. Cycle # 15 FOLFOX + Avastin was on 06/07/2016. CEA 90.5 on 06/07/2016. Ad[facility]/13/2016-06/16/2016 for abdominal pain: EGD noted 1.5 cm clean based duodenal bulb ulceration s/p epinephrine and bicap per Dr. Randall Laguerre. No active bleeding. A. Stomach, biopsy: Mild chronic gastritis, immunostain negative for Helicobacter  B. Esophagus, biopsy: Gastric glandular mucosa with prominent ntestinal metaplasia ([place]'s epithelium), negative for epithelial dysplasia, esophageal squamous mucosa not identified     Cycle # 16 FOLFOX (discontinued avastin (bevacizumab) given association of peptic ulcer disease and known association of GI perforation.) was on 06/21/2016. CEA 47 on 06/21/2016. Cycle # 17 FOLFOX was on 07/05/2016. CEA 52.6 on 07/05/2016. CEA 47.6 on 07/19/2016.     Re-staging scans 07/19/2106: CT Chest negative for metastatic disease. CT Abdomen/Pelvis: Stable hepatic lesions; Question new mural thickening in the cecum. Bone Scan: New Let hip lesion suspicious for bone metastasis.     Increased CEA; new mural thickening in the cecum and new left hip lesion suspicious for bone metastasis are consistent with disease progression; He derived maximum benefit from FOLFOX/AVASTIN. D/C FOLFOX/AVASTIN.  We recommended FOLFIRI second line therapy. Cycle # 1 FOLFIRI was on 08/02/2016. CEA 40.8 on 08/02/2016. Xgeva q4 weeks started on 08/02/2016. Cycle # 2 FOLFIRI was on 08/16/2016. CEA 31.7 on 08/16/2016. Cycle # 3 FOLFIRI (added Avastin) was on 08/30/2016 given that ulcers healed on EGD 08/15/2016 by Dr. Donis Angelucci; Protonix bid since avastin re-started. Colonoscopy on 08/29/2016 (to look at the cecal area) unremarkable. CEA 26.7 on 08/30/2016. Cycle # 4 FOLFIRI/Avastin was on 09/13/2016. CEA 23.4 on 09/13/2016. Cycle # 5 FOLFIRI/Avastin was on 09/27/2016. CEA 22.3 on 09/27/2016. Cycle # 6 FOLFIRI/Avastin was on 10/11/2016. CEA 18.4 on 10/11/2016.      Bone scan 10/21/2016 stable bone metastasis; ? New lesion anterior left sixth rib likely interval healing fracture. CT abdomen/pelvis revealed stable hepatic metastasis. CT chest negative for metastatic disease. Continue FOLFIRI/Avastin and repeat scans in 3 months. Cycle # 7 FOLFIRI/Avastin was on 10/25/2016. CEA 16.1 on 10/25/2016. Cycle # 8 FOLFIRI/Avastin was on 11/08/2016. CEA 15.7 on 11/08/2016. Cycle # 9 FOLFIRI/Avastin was on 11/29/2016. CEA 13.6 on 11/29/2016. Cycle # 10 FOLFIRI/Avastin was on 12/13/2016. CEA 10.6 on 12/13/2016. Cycle # 11 FOLFIRI/Avastin was on 12/27/2016. CEA 11.1 on 12/27/2016. Cycle # 12 FOLFIRI/Avastin was on 01/10/2017. CEA 9.7 on 01/10/2017. CT chest 01/23/2017 No new pulmonary nodule or lymphadenopathy. Patchy groundglass opacities within the left lower lobe, suggestive of infectious or inflammatory etiology. Followup CT chest in 3 months. Slight increased linear sclerosis along the left anterior sixth rib corresponding to an area of increased uptake on the prior bone scan from 10/21/2016, favored to be related to interval healing changes of a nondisplaced fracture. CT abdomen/pelvis on 01/23/2017 Redemonstration of multiple hepatic metastases, which are similar compared to the prior CT from 10/21/2016.  Redemonstration of area of increased sclerosis along the right acetabulum suspicious for metastatic disease. Bone scan on 01/23/2017 Stable subtle uptake within the left proximal diaphysis, again suggestive of metastatic disease  Decreased subtle uptake within the medial right acetabulum. Decreased uptake within the left anterior sixth rib corresponding to linear sclerosis on the recent CT, favored to be related to an joint rib fracture. Continue FOLFIRI + Avastin and repeat scans in 3 months. Cycle # 13 FOLFIRI + Avastin was on 01/24/2017. Cycle # 14 FOLFIRI + Avastin was on 02/07/2017. Cycle # 15 FOLFIRI + Avastin was on 02/21/2017. Cycle # 16 FOLFIRI + Avastin was on 03/07/2017. Cycle # 17 FOLFIRI + Avastin was on 03/21/2017. Cycle # 18 FOLFIRI + Avastin was on 04/04/2017. CT chest 04/17/2017 negative for metastatic disease. CT abdomen/pelvis 04/17/2017 Stable hypodense metastatic lesions within the liver. Stable area of increased sclerosis along the right acetabulum  Bone scan 04/17/2017 Stable subtle uptake within the left proximal femoral diaphysis; No definite abnormal uptake in the region of a sclerotic lesion along the posterior column of the right acetabulum noted on CT. Stable slight uptake within the left anterior sixth rib corresponding to linear sclerosis on the recent CT, favored to be related to a fracture. Continue FOLFIRI + Avastin and repeat scans in 3 months. Cycle # 19 FOLFIRI + Avastin was on 04/18/2017. CEA 7.5 on 04/18/2017. Cycle # 20 FOLFIRI + Avastin was on 05/02/2017. CEA 7.7 on 05/02/2017. Cycle # 21 FOLFIRI + Avastin was on 05/16/2017. CEA 7.1 on 05/16/2017. Cycle # 22 FOLFIRI + Avastin was on 05/30/2017. CEA 8.2 on 05/30/2017. Cycle # 23 FOLFIRI + Avastin was on 06/13/2017. CEA 7.7 on 06/13/2017. Cycle # 24 FOLFIRI + Avastin was on 06/27/2017. CEA 6.6 on 06/27/2017.    Re-staging scans 07/05/2017:  Bone scan stable proximal left femoral diaphysis, right acetabulum, and left anterior sixth rib lesions;  CT abdomen/pelvis stable hypodense metastatic lesions within the liver; CT chest without convincing evidence of metastatic disease. Cycle # 25 FOLFIRI + Avastin was on 07/11/2017. CEA 6.3 on 07/11/2017. Cycle # 26 FOLFIRI + Avastin was on 07/25/2017. CEA 7.3 on 07/25/2017. Cycle # 27 FOLFIRI + Avastin was on 08/08/2017. CEA 6.5 on 08/08/2017. Cycle # 28 FOLFIRI + Avastin was on 08/22/2017. CEA 5.9 on 08/22/2017. Cycle # 29 FOLFIRI + Avastin was on 09/05/2017. CEA 6.3 on 09/05/2017. Cycle # 30 FOLFIRI + Avastin was on 09/19/2017. CEA 6.3 on 09/19/2017. Bone scan 09/26/2017 stable. CT abdomen/pelvis on 09/26/2017 Stable hypodense mass in the liver. Stable sclerotic lesion in the acetabulum. CT chest 09/26/2017 negative for metastatic disease. Cycle # 31 FOLFIRI + Avastin was on 10/03/2017. CEA 7.4 on 10/03/2017. Cycle # 32 FOLFIRI + Avastin was on 10/17/2017. CEA 6.0 on 10/17/2017. Cycle # 33 FOLFIRI + Avastin was on 10/31/2017. CEA 6.8 on 10/31/2017. Cycle # 34 FOLFIRI + Avastin was on 11/14/2017. CEA 7.2 on 11/14/2017. Cycle # 35 FOLFIRI + Avastin was on 11/28/2017. CEA 5.1 on 11/28/2017. Cycle # 36 FOLFIRI + Avastin was on 12/12/2017. CEA 6.1 on 12/12/2017. Bone scan 12/22/2017 noted no evidence of metastatic disease to the axial or appendicular skeleton. CT chest 12/22/2017 noted no evidence of metastatic disease. CT abdomen/pelvis 12/22/2017 noted stable hypodense lesions in the liver. Continue FOLFIRI + Avastin and repeat scans in 3 months. Cycle # 37 FOLFIRI + Avastin was on 12/27/2018. CEA 6.7 on 12/27/2017. Cycle # 38 FOLFIRI + Avastin was on 01/09/2018. CEA 5.0 on 01/09/2018. Cycle # 39 FOLFIRI + Avastin was on 01/23/2018. CEA 6.5 on 01/23/2018. Cycle # 40 FOLFIRI + Avastin was on 02/06/2018. CEA 6.9 on 02/06/2018. Cycle # 41 FOLFIRI + Avastin was on 02/20/2018. CEA 6.4 on 02/20/2018. Cycle # 42 FOLFIRI + Avastin was on 03/06/2018.  CEA 6.6 on 03/06/2018. Cycle # 43 FOLFIRI + Avastin was on 03/20/2018. CEA 5.7 on 03/20/2018. Bone scan on 03/26/2018 negative for metastatic disease. CT chest 03/26/2018 noted ? new 7 mm nodule in LUCY. CT abdomen/pelvis 03/26/2018 noted stable hypodense lesions in the liver. ? New small ascites in the abdomen. Patient wants to continue to monitor the lung finding and repeat scans in 2 months instead of changing the current chemotherapy regimen to oral Lonsurf. Cycle # 44 FOLFIRI + Avastin was on 04/03/2018. CEA 6.3 on 04/03/2018. Cycle # 45 FOLFIRI + Avastin was on 04/24/2018. CEA 5.3 on 04/24/2018. Cycle # 46 FOLFIRI + Avastin was on 05/08/2018. CEA 5.4 on 05/08/2018. Cycle # 47 FOLFIRI + Avastin was on 05/22/2018. CEA 6.1 on 05/22/2018. CT chest 05/31/2018 noted stable nodule in LUCY. No evidence of worsening malignancy. CT abdomen/pelvis on 05/31/2018 noted stable liver metastasis. No evidence of worsening malignancy. Continue FOLFIRI + Avastin and repeat scans in 3 months. Cycle # 48 FOLFIRI + Avastin was on 06/06/2018. CEA 6.3 on 06/05/2018. Cycle # 49 FOLFIRI + Avastin was on 06/19/2018. CEA 6.1 on 06/19/2018. Cycle # 50 FOLFIRI + Avastin was on 07/03/2018. CEA 7.4 on 07/03/2018. Cycle # 51 FOLFIRI + Avastin was on 07/24/2018. CEA 6.7 on 07/24/2018. Cycle # 52 FOLFIRI + Avastin was on 08/14/2018. CEA 6.8 on 08/14/2018. Cycle # 53 FOLFIRI + Avastin was on 08/28/2018. CEA 6.5 on 08/27/2018. CT chest 09/06/2018 noted interval decrease in size of LUCY measuring up to 4 mm, suggestive of treatment response. No mediastinal or hilar LN  CT abdomen/pelvis 09/06/2018 Slight interval increase in size of a previously noted metastatic lesion within the right hepatic lobe. This may be related to differences in phase of enhancement compared to the most recent study from 5/31/2018, the lesion remains decreased in size compared to the more previous studies.   No abdominal, retroperitoneal, or pelvic lymphadenopathy. Continue FOLFIRI + Avastin and repeat scans in 2 months. Cycle # 54 FOLFIRI + Avastin was on 09/11/2018. CEA 6.4 on 09/10/2018. Cycle # 55 FOLFIRI + Avastin was on 09/25/2018. CEA 6.4 on 09/25/2018. Cycle # 56 FOLFIRI + Avastin was on 10/09/2018. CEA 6.7 on 10/08/2018. Cycle # 57 FOLFIRI + Avastin was on 10/23/2018. CEA 7.2 on 10/22/2018. CT chest 11/02/2018 stable 3 mm nodule within LUCY. No new right and left lung nodule. No mediastinal or osseous lesion. CT abdomen/pelvis 11/02/2018 stable metastatic disease to liver. No new metastatic disease identified. No mesenteric or retroperitoneal LN. No gross colonic lesion identified. Continue FOLFIRI + Avastin and repeat scans in 3 months. Cycle # 58 FOLFIRI + Avastin was on 11/06/2018. CEA 7.6 on 11/05/2018. Cycle # 59 FOLFIRI + Avastin was on 11/27/2018. CEA 6.9 on 11/26/2018. Cycle # 60 FOLFIRI + Avastin was on 12/11/2018. CEA 6.7 on 12/11/2018. Cycle # 61 FOLFIRI + Avastin was on 01/08/2019. CEA 5.6 on 01/07/2019. Cycle # 62 FOLFIRI + Avastin was on 01/29/2019. CEA 4.6 on 01/28/2019. Cycle # 63 FOLFIRI + Avastin was on 02/12/2019. CEA 4.3 on 02/11/2019. CT chest 02/21/2019 no evidence of metastatic disease. CT abdomen/pelvis 02/21/2019 stable hypodense liver lesions. No evidence of worsening metastatic disease. 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 Ordered MRI thoracic spine and referred to neurosurgery team.  Cycle # 64 FOLFIRI + Avastin was on 02/26/2019. CEA 4.7 on 02/25/2019. Cycle # 65 FOLFIRI + Avastin was on 03/12/2019. CEA 4.0 on 03/11/2019. Cycle # 66 FOLFIRI + Avastin was on 03/26/2019. CEA 4.7 on 03/25/2019. Cycle # 67 FOLFIRI + Avastin was on 04/09/2019. CEA 5.6 on 04/08/2019. Cycle # 68 FOLFIRI + Avastin was on 04/30/2019. CEA 4.9 on 04/29/2019. Cycle # 69 FOLFIRI + Avastin was on 05/14/2019. CEA 5.3 on 05/14/2019.    CT chest 05/23/2019 stable small lung nodule with no evidence of metastatic disease. CT abdomen/pelvis 05/23/2019 Decrease conspicuity of the liver lesions. No new lesions seen. Stable splenomegaly. Continue FOLFIRI + Avastin and repeat scans in 3 months. Cycle # 70 FOLFIRI + Avastin was on 06/04/2019. CEA 4.8 on 06/03/2019. Cycle # 71 FOLFIRI + Avastin was on 06/18/2019. CEA 4.6 on 06/17/2019. Cycle # 72 FOLFIRI + Avastin was on 07/09/2019. CEA 4.3 on 07/08/2019. Cycle # 73 FOLFIRI + Avastin was on 07/30/2019. CEA 5.0 on 07/29/2019. Cycle # 74 FOLFIRI + Avastin was on 08/13/2019. CEA 5.0 on 08/12/2019. CT chest 08/20/2019 negative  CT abdomen/pelvis 08/20/2019 Previous identified hepatic lesions are not visualized on the current exam.  Continue FOLFIRI + Avastin and repeat scans in 3 months. Cycle # 75 FOLFIRI + Avastin was on 08/27/2019. CEA 5.0 on 08/26/2019. Cycle # 76 FOLFIRI + Avastin was on 09/17/2019. CEA 5.5 on 09/16/2019. Cycle # 77 FOLFIRI + Avastin was on 10/15/2019. CEA 4.6 on 10/15/2019. Cycle # 78 FOLFIRI + Avastin was on 10/29/2019. CEA 4.1 on 10/28/2019. Cycle # 79 FOLFIRI + Avastin was on 11/12/2019. CEA 4.3 on 11/12/2019. Cycle # 80 FOLFIRI + Avastin was on 12/10/2019. CEA 4.0 on 12/09/2019. CT chest 12/19/2019 Stable 3 mm nodule within the left upper lobe, similar to the previous studies dating back to 11/2/2018, however decreased in size compared to the CT from 3/26/2018. No thoracic LN. CT abdomen/pelvis 12/19/2019 Previously described small hypodense lesions are more conspicuous on the current study compared to the recent study throughout the liver from 8/20/2019, however similar to the prior study from 2/21/2019. Findings may be related to differences in contrast opacification. No abdominal or pelvic lymphadenopathy. Continue FOLFIRI + Avastin and repeat scans in 2 months to f/u on liver lesions. Cycle # 81 FOLFIRI + Avastin was on 01/07/2020. CEA 3.8 on 01/06/2020. Cycle # 82 FOLFIRI + Avastin was on 01/21/2020.   CEA 3.7 on 01/20/2020. Cycle # 83 FOLFIRI + Avastin was on 02/04/2020. CEA 4.1 on 02/03/2020. Cycle # 84 FOLFIRI + Avastin was on 02/18/2020. CEA 4.6 on 02/17/2020. CT chest 2/28/2020 no evidence of metastatic disease to the lungs and no mediastinal or hilar adenopathy. CT abdomen pelvis 2/28/2020 no enhancing lesions seen within the liver to suggest metastatic disease. Wall thickening of the rectosigmoid junction. Images reviewed. Continue FOLFIRI Avastin and repeat scans in 3 months  EGD by Dr. Avtar Rowland 03/02/2020 showing no masses or lesions. Cycle # 85 FOLFIRI + Avastin was on 03/03/2020. CEA 7.4 on 03/02/2020. Cycle # 86 FOLFIRI + Avastin was on 03/17/2020. CEA 4.5 on 03/16/2020. Colonoscopy on 03/23/2020 by Dr. Avtar Rowland unremarkable (records requested). Cycle # 87 FOLFIRI + Avastin was on 03/31/2020. CEA 4.1 on 03/30/2020. Cycle # 88 FOLFIRI + Avastin was on 04/21/2020. CEA 6.4 on 04/20/2020. Cycle # 89 FOLFIRI + Avastin was on 05/05/2020. CEA 5.8 on 05/04/2020. Cycle # 90 FOLFIRI + Avastin was on 06/02/2020. CEA 5.5 on 06/01/2020. Cycle # 91 FOLFIRI + Avastin was on 06/16/2020. CEA 5.6 on 06/15/2020. CT chest 06/23/2020 noted no metastatic disease in the chest.   CT abdomen/pelvis 06/23/2020 Stable small liver lesions measuring up to 5 mm since 2019.   22 mm round mass in segment 8 of the liver with central high density stable from December 2019), previously measuring up to 34 mm in 2017. No additional metastatic disease in the abdomen or pelvis. Small amount of ascites. Overall stable disease. Continue FOLFIRI + Avastin and repeat scans in 2-3 months. Cycle # 92 FOLFIRI + Avastin was on 07/07/2020. CEA 5.7 on 07/06/2020. Cycle # 93 FOLFIRI + Avastin was on 07/21/2020. CEA 5.9 on 07/20/2020. Cycle # 94 FOLFIRI + Avastin was on 08/04/2020. CEA 5.5 on 08/04/2020. Cycle # 95 FOLFIRI + Avastin was on 08/25/2020. CEA 6.1 on 08/24/2020.   CT chest 9/2/2020 stable unremarkable enhanced CT of the thorax. There is no metastatic disease to the lungs. CT abdomen pelvis on 9/2/2020 stable 2.2 cm low attenuated lesion within the central aspect of the right hepatic lobe favored to represent treated metastatic disease. No new hepatic lesion is identified. Stable splenomegaly  There is no appreciable colonic lesion or stricture. Pericholecystic fluid of uncertain etiology. Laparoscopic cholecystectomy with normal cholangiogram 10/27/20  Gallbladder: Chronic cholecystitis and cholelithiasis.  Unremarkable regional lymph node. 11/13/2020; Seen by Dr. Nida Deras from General surgery team; cleared to re-start chemotherapy. Cycle # 96 FOLFIRI + Avastin was on 11/17/2020. CEA 3.6 on 11/16/2020. Cycle # 97 FOLFIRI + Avastin was on 12/01/2020. CEA 3.5 on 11/30/2020. Cycle # 98 FOLFIRI + Avastin was on 12/15/2020. CEA 4.3 on 12/15/2020. Cycle # 99 FOLFIRI + Avastin was on 01/05/2021. CEA 4.7 on 01/04/2021. CT chest 01/13/2021 negative for metastatic disease. CT abdomen/pelvis 01/13/2021 Unchanged central hepatic lesion. No specific signs of abdominopelvic metastatic disease. Continue FOLFIRI + Avastin and repeat scans in 3 months. Cycle # 100 FOLFIRI + Avastin was on 01/19/2021. CEA 4.7 on 01/18/2021. Cycle # 101 FOLFIRI + Avastin was on 02/02/2021. CEA 5.2 on 02/01/2021. Cycle # 102 FOLFIRI + Avastin is today 02/16/2021. CEA 5.6 on 02/15/2021. RTC 2 weeks for Cycle # 103 FOLFIRI + Avastin.    MSI testing noted no mismatch repair protein loss of expression    2/16/2021  Karlene Brink MD  Board Certified Medical Oncologist

## 2021-02-18 ENCOUNTER — HOSPITAL ENCOUNTER (OUTPATIENT)
Dept: INFUSION THERAPY | Age: 71
Discharge: HOME | End: 2021-02-18
Payer: MEDICARE

## 2021-02-18 DIAGNOSIS — C79.51 BONE METASTASIS (HCC): ICD-10-CM

## 2021-02-18 DIAGNOSIS — C78.7 RECTAL CANCER METASTASIZED TO LIVER (HCC): ICD-10-CM

## 2021-02-18 DIAGNOSIS — C20 RECTAL CANCER METASTASIZED TO LIVER (HCC): ICD-10-CM

## 2021-02-18 DIAGNOSIS — Z51.11 ENCOUNTER FOR ANTINEOPLASTIC CHEMOTHERAPY: Primary | ICD-10-CM

## 2021-02-18 PROCEDURE — 96372 THER/PROPH/DIAG INJ SC/IM: CPT

## 2021-02-18 PROCEDURE — 6360000002 HC RX W HCPCS: Performed by: INTERNAL MEDICINE

## 2021-02-18 RX ORDER — DIPHENOXYLATE HYDROCHLORIDE AND ATROPINE SULFATE 2.5; .025 MG/1; MG/1
1 TABLET ORAL 4 TIMES DAILY PRN
Qty: 40 TABLET | Refills: 2 | Status: SHIPPED | OUTPATIENT
Start: 2021-02-18 | End: 2021-02-28

## 2021-02-18 RX ADMIN — PEGFILGRASTIM-CBQV 6 MG: 6 INJECTION, SOLUTION SUBCUTANEOUS at 14:33

## 2021-03-01 ENCOUNTER — HOSPITAL ENCOUNTER (OUTPATIENT)
Age: 71
Discharge: HOME | End: 2021-03-01
Payer: MEDICARE

## 2021-03-01 DIAGNOSIS — C20 RECTAL CANCER METASTASIZED TO LIVER (HCC): ICD-10-CM

## 2021-03-01 DIAGNOSIS — C78.7 RECTAL CANCER METASTASIZED TO LIVER (HCC): ICD-10-CM

## 2021-03-01 DIAGNOSIS — C79.51 BONE METASTASIS (HCC): ICD-10-CM

## 2021-03-01 LAB
ALBUMIN SERPL-MCNC: 3.5 G/DL (ref 3.5–5.2)
ALP BLD-CCNC: 158 U/L (ref 40–129)
ALT SERPL-CCNC: 14 U/L (ref 0–40)
ANION GAP SERPL CALCULATED.3IONS-SCNC: 14 MMOL/L (ref 7–16)
ANISOCYTOSIS: ABNORMAL
AST SERPL-CCNC: 20 U/L (ref 0–39)
BASOPHILS ABSOLUTE: 0.01 E9/L (ref 0–0.2)
BASOPHILS RELATIVE PERCENT: 0.5 % (ref 0–2)
BILIRUB SERPL-MCNC: 1 MG/DL (ref 0–1.2)
BUN BLDV-MCNC: 20 MG/DL (ref 8–23)
CALCIUM SERPL-MCNC: 7.7 MG/DL (ref 8.6–10.2)
CHLORIDE BLD-SCNC: 101 MMOL/L (ref 98–107)
CO2: 23 MMOL/L (ref 22–29)
CREAT SERPL-MCNC: 1.5 MG/DL (ref 0.7–1.2)
EOSINOPHILS ABSOLUTE: 0.05 E9/L (ref 0.05–0.5)
EOSINOPHILS RELATIVE PERCENT: 2.5 % (ref 0–6)
FERRITIN: 309 NG/ML
GFR AFRICAN AMERICAN: 56
GFR NON-AFRICAN AMERICAN: 46 ML/MIN/1.73
GLUCOSE BLD-MCNC: 143 MG/DL (ref 74–99)
HCT VFR BLD CALC: 24.5 % (ref 37–54)
HEMOGLOBIN: 7.7 G/DL (ref 12.5–16.5)
HYPOCHROMIA: ABNORMAL
IMMATURE GRANULOCYTES #: 0.02 E9/L
IMMATURE GRANULOCYTES %: 1 % (ref 0–5)
IRON SATURATION: 21 % (ref 20–55)
IRON: 32 MCG/DL (ref 59–158)
LYMPHOCYTES ABSOLUTE: 0.32 E9/L (ref 1.5–4)
LYMPHOCYTES RELATIVE PERCENT: 16 % (ref 20–42)
MCH RBC QN AUTO: 30.1 PG (ref 26–35)
MCHC RBC AUTO-ENTMCNC: 31.4 % (ref 32–34.5)
MCV RBC AUTO: 95.7 FL (ref 80–99.9)
MONOCYTES ABSOLUTE: 0.24 E9/L (ref 0.1–0.95)
MONOCYTES RELATIVE PERCENT: 12 % (ref 2–12)
NEUTROPHILS ABSOLUTE: 1.36 E9/L (ref 1.8–7.3)
NEUTROPHILS RELATIVE PERCENT: 68 % (ref 43–80)
PDW BLD-RTO: 18.9 FL (ref 11.5–15)
PLATELET # BLD: 99 E9/L (ref 130–450)
PLATELET CONFIRMATION: NORMAL
PMV BLD AUTO: 10.3 FL (ref 7–12)
POIKILOCYTES: ABNORMAL
POLYCHROMASIA: ABNORMAL
POTASSIUM SERPL-SCNC: 3.3 MMOL/L (ref 3.5–5)
RBC # BLD: 2.56 E12/L (ref 3.8–5.8)
SODIUM BLD-SCNC: 138 MMOL/L (ref 132–146)
TEAR DROP CELLS: ABNORMAL
TOTAL IRON BINDING CAPACITY: 156 MCG/DL (ref 250–450)
TOTAL PROTEIN: 6.2 G/DL (ref 6.4–8.3)
WBC # BLD: 2 E9/L (ref 4.5–11.5)

## 2021-03-01 PROCEDURE — 85025 COMPLETE CBC W/AUTO DIFF WBC: CPT

## 2021-03-01 PROCEDURE — 80053 COMPREHEN METABOLIC PANEL: CPT

## 2021-03-01 PROCEDURE — 36415 COLL VENOUS BLD VENIPUNCTURE: CPT

## 2021-03-01 PROCEDURE — 83550 IRON BINDING TEST: CPT

## 2021-03-01 PROCEDURE — 83540 ASSAY OF IRON: CPT

## 2021-03-01 PROCEDURE — 82728 ASSAY OF FERRITIN: CPT

## 2021-03-02 ENCOUNTER — HOSPITAL ENCOUNTER (OUTPATIENT)
Dept: INFUSION THERAPY | Age: 71
Discharge: HOME | End: 2021-03-02
Payer: MEDICARE

## 2021-03-02 ENCOUNTER — OFFICE VISIT (OUTPATIENT)
Dept: ONCOLOGY | Age: 71
End: 2021-03-02
Payer: MEDICARE

## 2021-03-02 VITALS
TEMPERATURE: 97.7 F | RESPIRATION RATE: 18 BRPM | BODY MASS INDEX: 24.24 KG/M2 | HEART RATE: 69 BPM | OXYGEN SATURATION: 90 % | SYSTOLIC BLOOD PRESSURE: 108 MMHG | HEIGHT: 73 IN | DIASTOLIC BLOOD PRESSURE: 68 MMHG | WEIGHT: 182.9 LBS

## 2021-03-02 VITALS
RESPIRATION RATE: 16 BRPM | SYSTOLIC BLOOD PRESSURE: 117 MMHG | DIASTOLIC BLOOD PRESSURE: 57 MMHG | TEMPERATURE: 97.2 F | HEART RATE: 58 BPM

## 2021-03-02 DIAGNOSIS — C20 RECTAL CANCER (HCC): Primary | ICD-10-CM

## 2021-03-02 DIAGNOSIS — C79.51 BONE METASTASIS (HCC): ICD-10-CM

## 2021-03-02 DIAGNOSIS — C78.7 RECTAL CANCER METASTASIZED TO LIVER (HCC): Primary | ICD-10-CM

## 2021-03-02 DIAGNOSIS — C20 RECTAL CANCER METASTASIZED TO LIVER (HCC): Primary | ICD-10-CM

## 2021-03-02 PROCEDURE — 96413 CHEMO IV INFUSION 1 HR: CPT

## 2021-03-02 PROCEDURE — 96375 TX/PRO/DX INJ NEW DRUG ADDON: CPT

## 2021-03-02 PROCEDURE — 96415 CHEMO IV INFUSION ADDL HR: CPT

## 2021-03-02 PROCEDURE — 99214 OFFICE O/P EST MOD 30 MIN: CPT | Performed by: INTERNAL MEDICINE

## 2021-03-02 PROCEDURE — 96411 CHEMO IV PUSH ADDL DRUG: CPT

## 2021-03-02 PROCEDURE — 6360000002 HC RX W HCPCS: Performed by: INTERNAL MEDICINE

## 2021-03-02 PROCEDURE — 96417 CHEMO IV INFUS EACH ADDL SEQ: CPT

## 2021-03-02 PROCEDURE — 2580000003 HC RX 258: Performed by: INTERNAL MEDICINE

## 2021-03-02 PROCEDURE — 96368 THER/DIAG CONCURRENT INF: CPT

## 2021-03-02 PROCEDURE — 96372 THER/PROPH/DIAG INJ SC/IM: CPT

## 2021-03-02 RX ORDER — PALONOSETRON HYDROCHLORIDE 0.05 MG/ML
0.25 INJECTION, SOLUTION INTRAVENOUS ONCE
Status: COMPLETED | OUTPATIENT
Start: 2021-03-02 | End: 2021-03-02

## 2021-03-02 RX ORDER — FLUOROURACIL 50 MG/ML
850 INJECTION, SOLUTION INTRAVENOUS ONCE
Status: COMPLETED | OUTPATIENT
Start: 2021-03-02 | End: 2021-03-02

## 2021-03-02 RX ORDER — ATROPINE SULFATE 0.4 MG/ML
0.4 AMPUL (ML) INJECTION ONCE
Status: CANCELLED | OUTPATIENT
Start: 2021-03-02 | End: 2021-03-02

## 2021-03-02 RX ORDER — PALONOSETRON HYDROCHLORIDE 0.05 MG/ML
0.25 INJECTION, SOLUTION INTRAVENOUS ONCE
Status: CANCELLED | OUTPATIENT
Start: 2021-03-02

## 2021-03-02 RX ORDER — FLUOROURACIL 50 MG/ML
850 INJECTION, SOLUTION INTRAVENOUS ONCE
Status: CANCELLED | OUTPATIENT
Start: 2021-03-02

## 2021-03-02 RX ORDER — SODIUM CHLORIDE 0.9 % (FLUSH) 0.9 %
10 SYRINGE (ML) INJECTION PRN
Status: CANCELLED | OUTPATIENT
Start: 2021-03-02

## 2021-03-02 RX ORDER — SODIUM CHLORIDE 9 MG/ML
250 INJECTION, SOLUTION INTRAVENOUS CONTINUOUS
Status: DISCONTINUED | OUTPATIENT
Start: 2021-03-02 | End: 2021-03-03 | Stop reason: HOSPADM

## 2021-03-02 RX ORDER — SODIUM CHLORIDE 9 MG/ML
INJECTION, SOLUTION INTRAVENOUS CONTINUOUS
Status: CANCELLED | OUTPATIENT
Start: 2021-03-02

## 2021-03-02 RX ORDER — METHYLPREDNISOLONE SODIUM SUCCINATE 125 MG/2ML
125 INJECTION, POWDER, LYOPHILIZED, FOR SOLUTION INTRAMUSCULAR; INTRAVENOUS ONCE
Status: CANCELLED | OUTPATIENT
Start: 2021-03-02 | End: 2021-03-02

## 2021-03-02 RX ORDER — HEPARIN SODIUM (PORCINE) LOCK FLUSH IV SOLN 100 UNIT/ML 100 UNIT/ML
500 SOLUTION INTRAVENOUS PRN
Status: DISCONTINUED | OUTPATIENT
Start: 2021-03-02 | End: 2021-03-03 | Stop reason: HOSPADM

## 2021-03-02 RX ORDER — SODIUM CHLORIDE 9 MG/ML
250 INJECTION, SOLUTION INTRAVENOUS CONTINUOUS
Status: CANCELLED | OUTPATIENT
Start: 2021-03-02 | End: 2021-03-20

## 2021-03-02 RX ORDER — SODIUM CHLORIDE 0.9 % (FLUSH) 0.9 %
10 SYRINGE (ML) INJECTION PRN
Status: DISCONTINUED | OUTPATIENT
Start: 2021-03-02 | End: 2021-03-03 | Stop reason: HOSPADM

## 2021-03-02 RX ORDER — EPINEPHRINE 1 MG/ML
0.3 INJECTION, SOLUTION, CONCENTRATE INTRAVENOUS PRN
Status: CANCELLED | OUTPATIENT
Start: 2021-03-02

## 2021-03-02 RX ORDER — DEXAMETHASONE SODIUM PHOSPHATE 10 MG/ML
10 INJECTION INTRAMUSCULAR; INTRAVENOUS ONCE
Status: COMPLETED | OUTPATIENT
Start: 2021-03-02 | End: 2021-03-02

## 2021-03-02 RX ORDER — HEPARIN SODIUM (PORCINE) LOCK FLUSH IV SOLN 100 UNIT/ML 100 UNIT/ML
500 SOLUTION INTRAVENOUS PRN
Status: CANCELLED | OUTPATIENT
Start: 2021-03-02

## 2021-03-02 RX ORDER — 0.9 % SODIUM CHLORIDE 0.9 %
10 VIAL (ML) INJECTION ONCE
Status: CANCELLED | OUTPATIENT
Start: 2021-03-02 | End: 2021-03-02

## 2021-03-02 RX ORDER — DEXAMETHASONE SODIUM PHOSPHATE 10 MG/ML
10 INJECTION, SOLUTION INTRAMUSCULAR; INTRAVENOUS ONCE
Status: CANCELLED | OUTPATIENT
Start: 2021-03-02

## 2021-03-02 RX ORDER — DIPHENHYDRAMINE HYDROCHLORIDE 50 MG/ML
50 INJECTION INTRAMUSCULAR; INTRAVENOUS ONCE
Status: CANCELLED | OUTPATIENT
Start: 2021-03-02 | End: 2021-03-02

## 2021-03-02 RX ORDER — DOXYCYCLINE HYCLATE 50 MG/1
324 CAPSULE, GELATIN COATED ORAL
COMMUNITY

## 2021-03-02 RX ORDER — ATROPINE SULFATE 0.4 MG/ML
0.4 AMPUL (ML) INJECTION ONCE
Status: COMPLETED | OUTPATIENT
Start: 2021-03-02 | End: 2021-03-02

## 2021-03-02 RX ADMIN — DENOSUMAB 120 MG: 120 INJECTION SUBCUTANEOUS at 10:10

## 2021-03-02 RX ADMIN — BEVACIZUMAB 400 MG: 400 INJECTION, SOLUTION INTRAVENOUS at 09:31

## 2021-03-02 RX ADMIN — LEUCOVORIN CALCIUM 850 MG: 10 INJECTION INTRAMUSCULAR; INTRAVENOUS at 10:13

## 2021-03-02 RX ADMIN — ATROPINE SULFATE 0.4 MG: 0.4 INJECTION, SOLUTION INTRAMUSCULAR; INTRAVENOUS; SUBCUTANEOUS at 09:13

## 2021-03-02 RX ADMIN — SODIUM CHLORIDE, PRESERVATIVE FREE 10 ML: 5 INJECTION INTRAVENOUS at 12:10

## 2021-03-02 RX ADMIN — SODIUM CHLORIDE 400 MG: 9 INJECTION, SOLUTION INTRAVENOUS at 10:13

## 2021-03-02 RX ADMIN — Medication 500 UNITS: at 12:10

## 2021-03-02 RX ADMIN — DEXAMETHASONE SODIUM PHOSPHATE 10 MG: 10 INJECTION INTRAMUSCULAR; INTRAVENOUS at 09:16

## 2021-03-02 RX ADMIN — SODIUM CHLORIDE 250 ML: 9 INJECTION, SOLUTION INTRAVENOUS at 09:09

## 2021-03-02 RX ADMIN — PALONOSETRON 0.25 MG: 0.25 INJECTION, SOLUTION INTRAVENOUS at 09:12

## 2021-03-02 RX ADMIN — FLUOROURACIL 850 MG: 50 INJECTION, SOLUTION INTRAVENOUS at 12:05

## 2021-03-02 NOTE — PROGRESS NOTES
Patient presents to clinic today for Xgeva injection. Patient's labs monitored, specifically, Calcium level, to ensure no increased risk of hypocalcemia with administration of the medication. Patient's calcium level is 7.7 mg/dL, but corrected calcium is 8.1 mg/dL. Patient received therapy and will continue to be monitored prior to each dose.     Amina Gray, [facility] 3/2/2021 9:27 AM

## 2021-03-02 NOTE — PROGRESS NOTES
detected. 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     CEA 3488. Bone scan on 11/10/2015 noted no metastatic disease. CT chest on 11/10/2015 revealed Multiple pulmonary nodules in the upper and lower lobes consistent with metastatic disease;   Hepatic metastasis also visualized. 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 Mediport was placed. Cycle # 1 of FOLFOX + Avastin was on 11/23/2015. CEA was 4555 on 11/23/2015. Cycle # 2 of FOLFOX + Avastin was on 12/08/2015. CEA was 3160 on 12/08/2015. Cycle # 3 of FOLFOX + Avastin was on 12/22/2015. CEA was 2516 on 12/21/2015. Cycle # 4 of FOLFOX + Avastin was on 01/05/2016. CEA was 2098 on 01/05/2015. Cycle # 5 of FOLFOX + Avastin was on 01/19/2016. CEA was 1511 on 01/05/2015.     -Bone scan on 01/26/2016 noted no metastatic disease. CT chest on 01/26/2016 revealed Significant response to treatment with no visible residual nodules. CT scan abdomen/pelvis on 01/26/2016 noted Interval decreased size of multiple masses in the liver compatible with treatment response. Continue another 2 months of FOLFOX + Avastin and repeat scans. Cycle # 6 of FOLFOX + Avastin was on 02/02/2016.  on 02/02/2016. Cycle # 7 of FOLFOX + Avastin was on 02/16/2016.  on 02/16/2016. Cycle # 8 of FOLFOX + Avastin was on 03/01/2016.  on 03/01/2016. Cycle # 9 of FOLFOX + Avastin was on 03/15/2016.  on 03/15/2016. Cycle # 10 of FOLFOX + Avastin was on 03/29/2016. .4 on 03/29/2016. Cycle # 11 of FOLFOX + Avastin was on 04/12/2016. .4 on 04/12/2016.     Re-staging scans on 04/19/2016: CT Chest: clear lungs; no evidence of recurring pulmonary nodule; CT Abdomen/Pelvis: Further interval decrease in size of the multiple metastatic hepatic lesions, the largest lesion now measures 3.9 x 3.5 cm and previously measured 4.5 cm in maximum diameter.  No mesenteric lymphadenopathy is identified; Bone Scan: No evidence of osseous metastasis. Continue same regimen and re-stage in 2-3 months. Cycle # 12 FOLFOX + Avastin was on 04/26/2016. .5 on 04/26/2016. Cycle # 13 FOLFOX + Avastin was on 05/10/2016. .3 on 05/10/2016. Cycle # 14 FOLFOX+AVASTIN was on 05/24/2016. .5 on 05/24/2016. Cycle # 15 FOLFOX + Avastin was on 06/07/2016. CEA 90.5 on 06/07/2016. Admitted to [facility] 06/13/2016-06/16/2016 for abdominal pain: EGD noted 1.5 cm clean based duodenal bulb ulceration s/p epinephrine and bicap per Dr. César Nava. No active bleeding. A. Stomach, biopsy: Mild chronic gastritis, immunostain negative for Helicobacter  B. Esophagus, biopsy: Gastric glandular mucosa with prominent intestinal metaplasia ([place]'s epithelium), negative for epithelial dysplasia, esophageal squamous mucosa not identified  Cycle # 16 FOLFOX (discontinued avastin (bevacizumab) given association of peptic ulcer disease and known association of GI perforation) was on 06/21/2016. Cycle # 17 FOLFOX was on 07/05/2016. CEA 52.6 on 07/19/2016. Cycle # 17 FOLFOX was on 07/05/2016. CEA 52.6 on 07/05/2016. CEA 47.6 on 07/19/2016.     Re-staging scans 07/19/2106: CT Chest negative for metastatic disease. CT Abdomen/Pelvis: Stable hepatic lesions; Question new mural thickening in the cecum. Bone Scan: New Let hip lesion suspicious for bone metastasis.     Increased CEA; new mural thickening in the cecum and new left hip lesion suspicious for bone metastasis are consistent with disease progression; He derived maximum benefit from FOLFOX/AVASTIN. D/C FOLFOX/AVASTIN. We recommended FOLFIRI second line therapy. Cycle # 1 FOLFIRI was on 08/02/2016. CEA 40.8 on 08/02/2016. Xgeva q4 weeks started on 08/02/2016. Cycle # 2 FOLFIRI was on 08/16/2016. CEA 31.7 on 08/16/2016. Cycle # 3 FOLFIRI (added Avastin) was on 08/30/2016 given that ulcers healed on EGD 08/15/2016 by Dr. Arian Sena; Protonix bid since avastin re-started.    Colonoscopy on 08/29/2016 (to look at the cecal area) unremarkable. CEA 26.7 on 08/30/2016. Cycle # 4 FOLFIRI/Avastin was on 09/13/2016. CEA 23.4 on 09/13/2016. Cycle # 5 FOLFIRI/Avastin was on 09/27/2016. CEA 22.3 on 09/27/2016. Cycle # 6 FOLFIRI/Avastin was on 10/11/2016. CEA 18.4 on 10/11/2016.      Bone scan 10/21/2016 stable bone metastasis; ? New lesion anterior left sixth rib likely interval healing fracture. CT abdomen/pelvis revealed stable hepatic metastasis. CT chest negative for metastatic disease. Continue FOLFIRI/Avastin and repeat scans in 3 months. Cycle # 7 FOLFIRI/Avastin was on 10/25/2016. CEA 16.1  Cycle # 8 FOLFIRI/Avastin was on 11/08/2016. CEA 15.7  Cycle # 9 FOLFIRI/Avastin was on 11/29/2016. CEA 13.6 on 11/29/2016. Cycle # 10 FOLFIRI/Avastin was on 12/13/2016. CEA 10.6 on 12/13/2016. Cycle # 11 FOLFIRI/Avastin was on 12/27/2016. CEA 11.1 on 12/27/2016. Cycle # 12 FOLFIRI/Avastin was on 01/10/2017. CEA 9.7 on 01/10/2017.       CT chest 01/23/2017 No new pulmonary nodule or lymphadenopathy. Patchy groundglass opacities within the left lower lobe, suggestive of infectious or inflammatory etiology. Followup CT chest in 3 months. Slight increased linear sclerosis along the left anterior sixth rib corresponding to an area of increased uptake on the prior bone scan from 10/21/2016, favored to be related to interval healing changes of a nondisplaced fracture. CT abdomen/pelvis on 01/23/2017 Redemonstration of multiple hepatic metastases, which are similar compared to the prior CT from 10/21/2016. Redemonstration of area of increased sclerosis along the right acetabulum suspicious for metastatic disease. Bone scan on 01/23/2017 Stable subtle uptake within the left proximal diaphysis, again suggestive of metastatic disease  Decreased subtle uptake within the medial right acetabulum.    Decreased uptake within the left anterior sixth rib corresponding to linear sclerosis on the recent CT, favored to be related to an joint rib fracture. Continue FOLFIRI + Avastin and repeat scans in 3 months. Cycle # 13 FOLFIRI + Avastin was on 01/24/2017. Cycle # 14 FOLFIRI + Avastin was on 02/07/2017. Cycle # 15 FOLFIRI + Avastin was on 02/21/2017. Cycle # 16 FOLFIRI + Avastin was on 03/07/2017. Cycle # 17 FOLFIRI + Avastin was on 03/21/2017. Cycle # 18 FOLFIRI + Avastin was on 04/04/2017. CT chest 04/17/2017 negative for metastatic disease. CT abdomen/pelvis 04/17/2017 Stable hypodense metastatic lesions within the liver. Stable area of increased sclerosis along the right acetabulum  Bone scan 04/17/2017 Stable subtle uptake within the left proximal femoral diaphysis; No definite abnormal uptake in the region of a sclerotic lesion along the posterior column of the right acetabulum noted on CT. Stable slight uptake within the left anterior sixth rib corresponding to linear sclerosis on the recent CT, favored to be related to a fracture. Continue FOLFIRI + Avastin and repeat scans in 3 months. Cycle # 19 FOLFIRI + Avastin was on 04/18/2017. Cycle # 20 FOLFIRI + Avastin was on 05/02/2017. Cycle # 21 FOLFIRI + Avastin was on 05/16/2017. Cycle # 22 FOLFIRI + Avastin was on 05/30/2017. Cycle # 23 FOLFIRI + Avastin was on 06/13/2017. Cycle # 24 FOLFIRI + Avastin was on 06/27/2017. Cycle # 25 FOLFIRI + Avastin was on 07/11/2017. Cycle # 26 FOLFIRI + Avastin was on 07/25/2017. Cycle # 27 FOLFIRI + Avastin was on 08/08/2017. Cycle # 28 FOLFIRI + Avastin was on 08/22/2017. Cycle # 29 FOLFIRI + Avastin was on 09/05/2017. Cycle # 30 FOLFIRI + Avastin was on 09/19/2017. Bone scan 09/26/2017 stable. CT abdomen/pelvis on 09/26/2017 Stable hypodense mass in the liver. Stable sclerotic lesion in the acetabulum. CT chest 09/26/2017 negative for metastatic disease. Cycle # 31 FOLFIRI + Avastin was on 10/03/2017. CEA 7.4 on 10/03/2017. Cycle # 32 FOLFIRI + Avastin was on 10/17/2017.  CEA 6.0 on 10/17/2017. Cycle # 33 FOLFIRI + Avastin was on 10/31/2017. CEA 6.8 on 10/31/2017. Cycle # 34 FOLFIRI + Avastin was on 11/14/2017. CEA 7.2 on 11/14/2017. Cycle # 35 FOLFIRI + Avastin was on 11/28/2017. CEA 5.1 on 11/28/2017. Cycle # 36 FOLFIRI + Avastin was on 12/12/2017. CEA 6.1 on 12/12/2017. Bone scan 12/22/2017 noted no evidence of metastatic disease to the axial or appendicular skeleton. CT chest 12/22/2017 noted no evidence of metastatic disease. CT abdomen/pelvis 12/22/2017 noted stable hypodense lesions in the liver. Continue FOLFIRI + Avastin and repeat scans in 3 months. Cycle # 37 FOLFIRI + Avastin was on 12/27/2018. Cycle # 38 FOLFIRI + Avastin was on 01/09/2018. Cycle # 39 FOLFIRI + Avastin was on 01/23/2018. Cycle # 40 FOLFIRI + Avastin was on 02/06/2018. Cycle # 41 FOLFIRI + Avastin was on 02/20/2018. Cycle # 42 FOLFIRI + Avastin was on 03/06/2018. Cycle # 43 FOLFIRI + Avastin was on 03/20/2018. Bone scan on 03/26/2018 negative for metastatic disease. CT chest 03/26/2018 noted ? new 7 mm nodule in LUCY. CT abdomen/pelvis 03/26/2018 noted stable hypodense lesions in the liver. ? New small ascites in the abdomen. Patient wants to continue to monitor the lung finding and repeat scans in 2 months instead of changing the current regimen into oral Lonsurf. Cycle # 44 FOLFIRI + Avastin was on 04/03/2018. CEA 6.3 on 04/03/2018. Cycle # 45 FOLFIRI + Avastin was on 04/24/2018. CEA 5.3 on 04/24/2018. Cycle # 46 FOLFIRI + Avastin was on 05/08/2018. CEA 5.4 on 05/08/2018. Cycle # 47 FOLFIRI + Avastin was on 05/22/2018. CEA 6.1 on 05/22/2018. CT chest 05/31/2018 noted stable nodule in LUCY. No evidence of worsening malignancy. CT abdomen/pelvis on 05/31/2018 noted stable liver metastasis. No evidence of worsening malignancy. Continue FOLFIRI + Avastin and repeat scans in 3 months. Cycle # 48 FOLFIRI + Avastin was on 06/06/2018. Cycle # 49 FOLFIRI + Avastin was on 06/19/2018. Cycle # 50 FOLFIRI + Avastin was on 07/03/2018. Cycle # 51 FOLFIRI + Avastin was on 07/24/2018. Cycle # 52 FOLFIRI + Avastin was on 08/14/2018. Cycle # 53 FOLFIRI + Avastin was on 08/28/2018. CT chest 09/06/2018 noted interval decrease in size of LUCY measuring up to 4 mm, suggestive of treatment response. No mediastinal or hilar LN  CT abdomen/pelvis 09/06/2018 Slight interval increase in size of a previously noted metastatic lesion within the right hepatic lobe. This may be related to differences in phase of enhancement compared to the most recent study from 5/31/2018, the lesion remains decreased in size compared to the more previous studies. No abdominal, retroperitoneal, or pelvic lymphadenopathy. Continue FOLFIRI + Avastin and repeat scans in 2 months. Cycle # 54 FOLFIRI + Avastin was on 09/11/2018. Cycle # 55 FOLFIRI + Avastin was on 09/25/2018. Cycle # 56 FOLFIRI + Avastin was on 10/09/2018. Cycle # 57 FOLFIRI + Avastin was on 10/23/2018. CT chest 11/02/2018 stable 3 mm nodule within LUCY. No new right and left lung nodule. No mediastinal or osseous lesion. CT abdomen/pelvis 11/02/2018 stable metastatic disease to liver. No new metastatic disease identified. No mesenteric or retroperitoneal LN. No gross colonic lesion identified. Continue FOLFIRI + Avastin and repeat scans in 3 months. Cycle # 58 FOLFIRI + Avastin was on 11/06/2018. CEA 7.6 on 11/05/2018. Cycle # 59 FOLFIRI + Avastin was on 11/27/2018. CEA 6.9 on 11/26/2018. Cycle # 60 FOLFIRI + Avastin was on 12/11/2018. CEA 6.7 on 12/11/2018. Cycle # 61 FOLFIRI + Avastin was on 01/08/2019. CEA 5.6 on 01/07/2019. Cycle # 62 FOLFIRI + Avastin was on 01/29/2019. CEA 4.6 on 01/28/2019. Cycle # 63 FOLFIRI + Avastin was on 02/12/2019. CEA 4.3 on 02/11/2019. CT chest 02/21/2019 no evidence of metastatic disease. CT abdomen/pelvis 02/21/2019 stable hypodense liver lesions. No evidence of worsening metastatic disease.  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 Ordered MRI thoracic spine and referred to neurosurgery team.    Cycle # 64 FOLFIRI + Avastin was on 02/26/2019. CEA 4.7 on 02/25/2019. Cycle # 65 FOLFIRI + Avastin was on 03/12/2019. CEA 4.0 on 03/11/2019. Cycle # 66 FOLFIRI + Avastin was on 03/26/2019. CEA 4.7 on 03/25/2019. Cycle # 67 FOLFIRI + Avastin was on 04/09/2019. CEA 5.6 on 04/08/2019. Cycle # 68 FOLFIRI + Avastin was on 04/30/2019. CEA 4.9 on 04/29/2019. Cycle # 69 FOLFIRI + Avastin was on 05/14/2019. CEA 5.3 on 05/14/2019. CT chest 05/23/2019 stable small lung nodule with no evidence of metastatic disease. CT abdomen/pelvis 05/23/2019 Decrease conspicuity of the liver lesions. No new lesions seen. Stable splenomegaly. Continue FOLFIRI + Avastin and repeat scans in 3 months. Cycle # 70 FOLFIRI + Avastin was on 06/04/2019. CEA 4.8 on 06/03/2019. Cycle # 71 FOLFIRI + Avastin was on 06/18/2019. CEA 4.6 on 06/17/2019. Cycle # 72 FOLFIRI + Avastin was on 07/09/2019. CEA 4.3 on 07/08/2019. Cycle # 73 FOLFIRI + Avastin was on 07/30/2019. CEA 5.0 on 07/29/2019. Cycle # 74 FOLFIRI + Avastin was on 08/13/2019. CEA 5.0 on 08/12/2019. CT chest 08/20/2019 negative  CT abdomen/pelvis 08/20/2019 Previous identified hepatic lesions are not visualized on the current exam.  Continue FOLFIRI + Avastin and repeat scans in 3 months. Cycle # 75 FOLFIRI + Avastin was on 08/27/2019. CEA 5.0 on 08/26/2019. Cycle # 76 FOLFIRI + Avastin was on 09/17/2019. CEA 5.5 on 09/16/2019. Cycle # 77 FOLFIRI + Avastin was on 10/15/2019. CEA 4.6 on 10/15/2019. Cycle # 78 FOLFIRI + Avastin was on 10/29/2019. CEA 4.1 on 10/28/2019. Cycle # 79 FOLFIRI + Avastin was on 11/12/2019. CEA 4.3 on 11/12/2019. Cycle # 80 FOLFIRI + Avastin was on 12/10/2019. CEA 4.0 on 12/09/2019.   CT chest 12/19/2019 Stable 3 mm nodule within the left upper lobe, similar to the previous studies dating back to 11/2/2018, however decreased in size compared to the CT from 3/26/2018. No thoracic LN. CT abdomen/pelvis 12/19/2019 Previously described small hypodense lesions are more conspicuous on the current study compared to the recent study throughout the liver from 8/20/2019, however similar to the prior study from 2/21/2019. Findings may be related to differences in contrast opacification. No abdominal or pelvic lymphadenopathy. Continue FOLFIRI + Avastin and repeat scans in 2 months to f/u on liver lesions. Cycle # 81 FOLFIRI + Avastin was on 01/07/2020. CEA 3.8 on 01/06/2020. Cycle # 82 FOLFIRI + Avastin was on 01/21/2020. CEA 3.7 on 01/20/2020. Cycle # 83 FOLFIRI + Avastin was on 02/04/2020. CEA 4.1 on 02/03/2020. Cycle # 84 FOLFIRI + Avastin was on 02/18/2020. CEA 4.6 on 02/17/2020. EGD by Dr. Garret Ahumada 03/02/2020 showing no masses or lesions  Cycle # 85 FOLFIRI + Avastin was on 03/03/2020. CEA 7.4 on 03/02/2020. Cycle # 86 FOLFIRI + Avastin was on 03/17/2020. CEA 4.5 on 03/16/2020. Colonoscopy on 03/23/2020 by Dr. Garret Ahumada unremarkable (records requested). Cycle # 87 FOLFIRI + Avastin was on 03/31/2020. CEA 4.1 on 03/30/2020. Cycle # 88 FOLFIRI + Avastin was on 04/21/2020. CEA 6.4 on 04/20/2020. Cycle # 89 FOLFIRI + Avastin was on 05/05/2020. CEA 5.8 on 05/04/2020. Cycle # 90 FOLFIRI + Avastin was on 06/02/2020. CEA 5.5 on 06/01/2020. Cycle # 91 FOLFIRI + Avastin was on 06/16/2020. CEA 5.6 on 06/15/2020. CT chest 06/23/2020 noted no metastatic disease in the chest.   CT abdomen/pelvis 06/23/2020 Stable small liver lesions measuring up to 5 mm since 2019.   22 mm round mass in segment 8 of the liver with central high density stable from December 2019), previously measuring up to 34 mm in 2017. No additional metastatic disease in the abdomen or pelvis. Small amount of ascites. Overall stable disease. Continue FOLFIRI + Avastin and repeat scans in 2-3 months. Cycle # 92 FOLFIRI + Avastin was on 07/07/2020.  CEA 5.7 on 07/06/2020. Cycle # 93 FOLFIRI + Avastin was on 07/21/2020. CEA 5.9 on 07/20/2020. Cycle # 94 FOLFIRI + Avastin was on 08/04/2020. CEA 5.5 on 08/04/2020. Cycle # 95 FOLFIRI + Avastin was on 08/25/2020. CEA 6.1 on 08/24/2020. CT chest 9/2/2020 stable unremarkable enhanced CT of the thorax. There is no metastatic disease to the lungs. CT abdomen pelvis on 9/2/2020 stable 2.2 cm low attenuated lesion within the central aspect of the right hepatic lobe favored to represent treated metastatic disease. No new hepatic lesion is identified. Stable splenomegaly  There is no appreciable colonic lesion or stricture  Pericholecystic fluid of uncertain etiology. General surgery team consulted. Laparoscopic cholecystectomy with normal cholangiogram 10/27/20  Gallbladder: Chronic cholecystitis and cholelithiasis.  Unremarkable regional lymph node. 11/13/2020; Seen by Dr. Knight from General surgery team; cleared to re-start chemotherapy. Cycle # 96 FOLFIRI + Avastin was on 11/17/2020. CEA 3.6 on 11/16/2020. Cycle # 97 FOLFIRI + Avastin was on 12/01/2020. CEA 3.5 on 11/30/2020. Cycle # 98 FOLFIRI + Avastin was on 12/15/2020. CEA 4.3 on 12/15/2020. Cycle # 99 FOLFIRI + Avastin was on 01/05/2021. CEA 4.7 on 01/04/2021. CT chest 01/13/2021 negative for metastatic disease. CT abdomen/pelvis 01/13/2021 Unchanged central hepatic lesion. No specific signs of abdominopelvic metastatic disease. Continue FOLFIRI + Avastin and repeat scans in 3 months. Cycle # 100 FOLFIRI + Avastin was on 01/19/2021. CEA 4.7 on 01/18/2021. Cycle # 101 FOLFIRI + Avastin was on 02/02/2021. CEA 5.2 on 02/01/2021. Cycle # 102 FOLFIRI + Avastin was on 02/16/2021. CEA 5.6 on 02/15/2021. Cycle # 103 FOLFIRI + Avastin is today 03/02/2021. Today 03/02/2021. No fever, chills. Worsening fatigue. Intermittent nausea and diarrhea    Review of Systems;  CONSTITUTIONAL: No fever, chills. Fair appetite. Worsening fatigue.   ENMT: Eyes: No diplopia; Nose: No epistaxis. Mouth:No lesions  RESPIRATORY: No hemoptysis, shortness of breath. CARDIOVASCULAR: No chest pain, palpitations. GASTROINTESTINAL:Intermittent nausea and diarrhea   GENITOURINARY: No dysuria, urinary frequency, hematuria. NEURO: No syncope, presyncope, headache. Remainder ROS: Negative. Past Medical History:   Past Medical History               Diagnosis Date    Arthritis      Cancer ([address]         colon    Depression      Hyperlipidemia      Hypertension           Medications:  Reviewed and reconciled.     Allergies: Allergies   Allergen Reactions    Neosporin [Neomycin-Polymyxin-Gramicidin] Rash    Tape Emogene Maries Tape] Rash      Physical Exam:  /68   Pulse 69   Temp 97.7 °F (36.5 °C)   Resp 18   Ht 6' 1\" (1.854 m)   Wt 182 lb 14.4 oz (83 kg)   SpO2 90%   BMI 24.13 kg/m²   GENERAL: Alert, oriented x 3, tired  HEENT: PERRLA, EOMI. No oral lesions. NECK: Supple. Without lymphadenopathy. LUNGS: Lungs are CTA bilaterally, with no wheezing, crackles or rhonchi. CARDIOVASCULAR: Regular rhythm. No murmurs, rubs or gallops. ABDOMEN: Soft. Non-tender, non-distended. Positive bowel sounds. EXTREMITIES: Without clubbing, cyanosis or edema. NEUROLOGIC: No focal deficits.    ECOG PS 1-2    Diagnostics:  Lab Results   Component Value Date    WBC 2.0 (L) 03/01/2021    HGB 7.7 (L) 03/01/2021    HCT 24.5 (L) 03/01/2021    MCV 95.7 03/01/2021    PLT 99 (L) 03/01/2021     Lab Results   Component Value Date     03/01/2021    K 3.3 (L) 03/01/2021     03/01/2021    CO2 23 03/01/2021    BUN 20 03/01/2021    CREATININE 1.5 (H) 03/01/2021    GLUCOSE 143 (H) 03/01/2021    CALCIUM 7.7 (L) 03/01/2021    PROT 6.2 (L) 03/01/2021    LABALBU 3.5 03/01/2021    BILITOT 1.0 03/01/2021    ALKPHOS 158 (H) 03/01/2021    AST 20 03/01/2021    ALT 14 03/01/2021    LABGLOM 46 03/01/2021    GFRAA 56 03/01/2021     Lab Results   Component Value Date    CEA 5.6 (H) 02/15/2021      Impression/Plan:  68 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 KRAS Mutation: Mutation detected. BRAF Mutation: Mutation not detected. 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 CT scan abdomen/pelvis on 10/26/2015 revealed small nodules at the lung bases, extensive liver lesions consistent with metastatic rectosigmoid cancer. CEA 3488 on 10/30/2015. Bone scan on 11/10/2015 noted no metastatic disease. CT chest on 11/10/2015 revealed Multiple pulmonary nodules in the upper and lower lobes consistent with metastatic disease; Hepatic metastasis also visualized. 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 Mediport was placed. Cycle # 1 of FOLFOX + Avastin was on 11/23/2015. CEA was 4555 on 11/23/2015. Cycle # 2 of FOLFOX + Avastin was on 12/08/2015. CEA was 3160 on 12/08/2015. Cycle # 3 of FOLFOX + Avastin was on 12/22/2015. CEA was 2516 on 12/21/2015. Cycle # 4 of FOLFOX + Avastin was on 01/05/2016. CEA was 2098 on 01/05/2015. Cycle # 5 of FOLFOX + Avastin was on 01/19/2016. CEA was 1511 on 01/05/2015.     -Bone scan on 01/26/2016 noted no metastatic disease. CT chest on 01/26/2016 revealed Significant response to treatment with no visible residual nodules. CT scan abdomen/pelvis on 01/26/2016 noted Interval decreased size of multiple masses in the liver compatible with treatment response. Continue another 2 months of FOLFOX + Avastin and repeat scans. Cycle # 6 of FOLFOX + Avastin was on 02/02/2016.  on 02/02/2016. Cycle # 7 of FOLFOX + Avastin was on 02/16/2016.  on 02/16/2016. Cycle # 8 of FOLFOX + Avastin was on 03/01/2016.  on 03/01/2016. Cycle # 9 of FOLFOX + Avastin was on 03/15/2016.  on 03/15/2016. Cycle # 10 of FOLFOX + Avastin was on 03/29/2016. .4 on 03/29/2016. Cycle # 11 of FOLFOX + Avastin was on 04/12/2016.  .4 on 04/12/2016.     Re-staging scans on 04/19/2016: CT Chest: clear lungs; no evidence of recurring pulmonary nodule; CT Abdomen/Pelvis: Further interval decrease in size of the multiple metastatic hepatic lesions, the largest lesion now measures 3.9 x 3.5 cm and previously measured 4.5 cm in maximum diameter. No mesenteric lymphadenopathy is identified; Bone Scan: No evidence of osseous metastasis. Continue same regimen and re-stage in 2-3 months. Cycle # 12 FOLFOX + Avastin was on 04/26/2016. .5 on 04/26/2016. Cycle # 13 FOLFOX + Avastin was on 05/10/2016. .3 on 05/10/2016. Cycle # 14 FOLFOX+AVASTIN was on 05/24/2016. .5 on 05/24/2016. Cycle # 15 FOLFOX + Avastin was on 06/07/2016. CEA 90.5 on 06/07/2016. Admitted to [facility] 06/13/2016-06/16/2016 for abdominal pain: EGD noted 1.5 cm clean based duodenal bulb ulceration s/p epinephrine and bicap per Dr. Georgina Shanks. No active bleeding. A. Stomach, biopsy: Mild chronic gastritis, immunostain negative for Helicobacter  B. Esophagus, biopsy: Gastric glandular mucosa with prominent ntestinal metaplasia ([place]'s epithelium), negative for epithelial dysplasia, esophageal squamous mucosa not identified     Cycle # 16 FOLFOX (discontinued avastin (bevacizumab) given association of peptic ulcer disease and known association of GI perforation.) was on 06/21/2016. CEA 47 on 06/21/2016. Cycle # 17 FOLFOX was on 07/05/2016. CEA 52.6 on 07/05/2016. CEA 47.6 on 07/19/2016.     Re-staging scans 07/19/2106: CT Chest negative for metastatic disease. CT Abdomen/Pelvis: Stable hepatic lesions; Question new mural thickening in the cecum. Bone Scan: New Let hip lesion suspicious for bone metastasis.     Increased CEA; new mural thickening in the cecum and new left hip lesion suspicious for bone metastasis are consistent with disease progression; He derived maximum benefit from FOLFOX/AVASTIN. D/C FOLFOX/AVASTIN. We recommended FOLFIRI second line therapy.    Cycle # 1 FOLFIRI was on 08/02/2016. CEA 40.8 on 08/02/2016. Xgeva q4 weeks started on 08/02/2016. Cycle # 2 FOLFIRI was on 08/16/2016. CEA 31.7 on 08/16/2016. Cycle # 3 FOLFIRI (added Avastin) was on 08/30/2016 given that ulcers healed on EGD 08/15/2016 by Dr. Zachary Valencia; Protonix bid since avastin re-started. Colonoscopy on 08/29/2016 (to look at the cecal area) unremarkable. CEA 26.7 on 08/30/2016. Cycle # 4 FOLFIRI/Avastin was on 09/13/2016. CEA 23.4 on 09/13/2016. Cycle # 5 FOLFIRI/Avastin was on 09/27/2016. CEA 22.3 on 09/27/2016. Cycle # 6 FOLFIRI/Avastin was on 10/11/2016. CEA 18.4 on 10/11/2016.      Bone scan 10/21/2016 stable bone metastasis; ? New lesion anterior left sixth rib likely interval healing fracture. CT abdomen/pelvis revealed stable hepatic metastasis. CT chest negative for metastatic disease. Continue FOLFIRI/Avastin and repeat scans in 3 months. Cycle # 7 FOLFIRI/Avastin was on 10/25/2016. CEA 16.1 on 10/25/2016. Cycle # 8 FOLFIRI/Avastin was on 11/08/2016. CEA 15.7 on 11/08/2016. Cycle # 9 FOLFIRI/Avastin was on 11/29/2016. CEA 13.6 on 11/29/2016. Cycle # 10 FOLFIRI/Avastin was on 12/13/2016. CEA 10.6 on 12/13/2016. Cycle # 11 FOLFIRI/Avastin was on 12/27/2016. CEA 11.1 on 12/27/2016. Cycle # 12 FOLFIRI/Avastin was on 01/10/2017. CEA 9.7 on 01/10/2017. CT chest 01/23/2017 No new pulmonary nodule or lymphadenopathy. Patchy groundglass opacities within the left lower lobe, suggestive of infectious or inflammatory etiology. Followup CT chest in 3 months. Slight increased linear sclerosis along the left anterior sixth rib corresponding to an area of increased uptake on the prior bone scan from 10/21/2016, favored to be related to interval healing changes of a nondisplaced fracture. CT abdomen/pelvis on 01/23/2017 Redemonstration of multiple hepatic metastases, which are similar compared to the prior CT from 10/21/2016.  Redemonstration of area of increased sclerosis along the right acetabulum suspicious for metastatic disease. Bone scan on 01/23/2017 Stable subtle uptake within the left proximal diaphysis, again suggestive of metastatic disease  Decreased subtle uptake within the medial right acetabulum. Decreased uptake within the left anterior sixth rib corresponding to linear sclerosis on the recent CT, favored to be related to an joint rib fracture. Continue FOLFIRI + Avastin and repeat scans in 3 months. Cycle # 13 FOLFIRI + Avastin was on 01/24/2017. Cycle # 14 FOLFIRI + Avastin was on 02/07/2017. Cycle # 15 FOLFIRI + Avastin was on 02/21/2017. Cycle # 16 FOLFIRI + Avastin was on 03/07/2017. Cycle # 17 FOLFIRI + Avastin was on 03/21/2017. Cycle # 18 FOLFIRI + Avastin was on 04/04/2017. CT chest 04/17/2017 negative for metastatic disease. CT abdomen/pelvis 04/17/2017 Stable hypodense metastatic lesions within the liver. Stable area of increased sclerosis along the right acetabulum  Bone scan 04/17/2017 Stable subtle uptake within the left proximal femoral diaphysis; No definite abnormal uptake in the region of a sclerotic lesion along the posterior column of the right acetabulum noted on CT. Stable slight uptake within the left anterior sixth rib corresponding to linear sclerosis on the recent CT, favored to be related to a fracture. Continue FOLFIRI + Avastin and repeat scans in 3 months. Cycle # 19 FOLFIRI + Avastin was on 04/18/2017. CEA 7.5 on 04/18/2017. Cycle # 20 FOLFIRI + Avastin was on 05/02/2017. CEA 7.7 on 05/02/2017. Cycle # 21 FOLFIRI + Avastin was on 05/16/2017. CEA 7.1 on 05/16/2017. Cycle # 22 FOLFIRI + Avastin was on 05/30/2017. CEA 8.2 on 05/30/2017. Cycle # 23 FOLFIRI + Avastin was on 06/13/2017. CEA 7.7 on 06/13/2017. Cycle # 24 FOLFIRI + Avastin was on 06/27/2017. CEA 6.6 on 06/27/2017.    Re-staging scans 07/05/2017:  Bone scan stable proximal left femoral diaphysis, right acetabulum, and left anterior sixth rib lesions;  CT abdomen/pelvis stable hypodense metastatic lesions within the liver; CT chest without convincing evidence of metastatic disease. Cycle # 25 FOLFIRI + Avastin was on 07/11/2017. CEA 6.3 on 07/11/2017. Cycle # 26 FOLFIRI + Avastin was on 07/25/2017. CEA 7.3 on 07/25/2017. Cycle # 27 FOLFIRI + Avastin was on 08/08/2017. CEA 6.5 on 08/08/2017. Cycle # 28 FOLFIRI + Avastin was on 08/22/2017. CEA 5.9 on 08/22/2017. Cycle # 29 FOLFIRI + Avastin was on 09/05/2017. CEA 6.3 on 09/05/2017. Cycle # 30 FOLFIRI + Avastin was on 09/19/2017. CEA 6.3 on 09/19/2017. Bone scan 09/26/2017 stable. CT abdomen/pelvis on 09/26/2017 Stable hypodense mass in the liver. Stable sclerotic lesion in the acetabulum. CT chest 09/26/2017 negative for metastatic disease. Cycle # 31 FOLFIRI + Avastin was on 10/03/2017. CEA 7.4 on 10/03/2017. Cycle # 32 FOLFIRI + Avastin was on 10/17/2017. CEA 6.0 on 10/17/2017. Cycle # 33 FOLFIRI + Avastin was on 10/31/2017. CEA 6.8 on 10/31/2017. Cycle # 34 FOLFIRI + Avastin was on 11/14/2017. CEA 7.2 on 11/14/2017. Cycle # 35 FOLFIRI + Avastin was on 11/28/2017. CEA 5.1 on 11/28/2017. Cycle # 36 FOLFIRI + Avastin was on 12/12/2017. CEA 6.1 on 12/12/2017. Bone scan 12/22/2017 noted no evidence of metastatic disease to the axial or appendicular skeleton. CT chest 12/22/2017 noted no evidence of metastatic disease. CT abdomen/pelvis 12/22/2017 noted stable hypodense lesions in the liver. Continue FOLFIRI + Avastin and repeat scans in 3 months. Cycle # 37 FOLFIRI + Avastin was on 12/27/2018. CEA 6.7 on 12/27/2017. Cycle # 38 FOLFIRI + Avastin was on 01/09/2018. CEA 5.0 on 01/09/2018. Cycle # 39 FOLFIRI + Avastin was on 01/23/2018. CEA 6.5 on 01/23/2018. Cycle # 40 FOLFIRI + Avastin was on 02/06/2018. CEA 6.9 on 02/06/2018. Cycle # 41 FOLFIRI + Avastin was on 02/20/2018. CEA 6.4 on 02/20/2018. Cycle # 42 FOLFIRI + Avastin was on 03/06/2018. CEA 6.6 on 03/06/2018.   Cycle # 43 FOLFIRI + Avastin was on 03/20/2018. CEA 5.7 on 03/20/2018. Bone scan on 03/26/2018 negative for metastatic disease. CT chest 03/26/2018 noted ? new 7 mm nodule in LUCY. CT abdomen/pelvis 03/26/2018 noted stable hypodense lesions in the liver. ? New small ascites in the abdomen. Patient wants to continue to monitor the lung finding and repeat scans in 2 months instead of changing the current chemotherapy regimen to oral Lonsurf. Cycle # 44 FOLFIRI + Avastin was on 04/03/2018. CEA 6.3 on 04/03/2018. Cycle # 45 FOLFIRI + Avastin was on 04/24/2018. CEA 5.3 on 04/24/2018. Cycle # 46 FOLFIRI + Avastin was on 05/08/2018. CEA 5.4 on 05/08/2018. Cycle # 47 FOLFIRI + Avastin was on 05/22/2018. CEA 6.1 on 05/22/2018. CT chest 05/31/2018 noted stable nodule in LUCY. No evidence of worsening malignancy. CT abdomen/pelvis on 05/31/2018 noted stable liver metastasis. No evidence of worsening malignancy. Continue FOLFIRI + Avastin and repeat scans in 3 months. Cycle # 48 FOLFIRI + Avastin was on 06/06/2018. CEA 6.3 on 06/05/2018. Cycle # 49 FOLFIRI + Avastin was on 06/19/2018. CEA 6.1 on 06/19/2018. Cycle # 50 FOLFIRI + Avastin was on 07/03/2018. CEA 7.4 on 07/03/2018. Cycle # 51 FOLFIRI + Avastin was on 07/24/2018. CEA 6.7 on 07/24/2018. Cycle # 52 FOLFIRI + Avastin was on 08/14/2018. CEA 6.8 on 08/14/2018. Cycle # 53 FOLFIRI + Avastin was on 08/28/2018. CEA 6.5 on 08/27/2018. CT chest 09/06/2018 noted interval decrease in size of LUCY measuring up to 4 mm, suggestive of treatment response. No mediastinal or hilar LN  CT abdomen/pelvis 09/06/2018 Slight interval increase in size of a previously noted metastatic lesion within the right hepatic lobe. This may be related to differences in phase of enhancement compared to the most recent study from 5/31/2018, the lesion remains decreased in size compared to the more previous studies. No abdominal, retroperitoneal, or pelvic lymphadenopathy.   Continue FOLFIRI + Avastin and repeat scans in 2 months. Cycle # 54 FOLFIRI + Avastin was on 09/11/2018. CEA 6.4 on 09/10/2018. Cycle # 55 FOLFIRI + Avastin was on 09/25/2018. CEA 6.4 on 09/25/2018. Cycle # 56 FOLFIRI + Avastin was on 10/09/2018. CEA 6.7 on 10/08/2018. Cycle # 57 FOLFIRI + Avastin was on 10/23/2018. CEA 7.2 on 10/22/2018. CT chest 11/02/2018 stable 3 mm nodule within LUCY. No new right and left lung nodule. No mediastinal or osseous lesion. CT abdomen/pelvis 11/02/2018 stable metastatic disease to liver. No new metastatic disease identified. No mesenteric or retroperitoneal LN. No gross colonic lesion identified. Continue FOLFIRI + Avastin and repeat scans in 3 months. Cycle # 58 FOLFIRI + Avastin was on 11/06/2018. CEA 7.6 on 11/05/2018. Cycle # 59 FOLFIRI + Avastin was on 11/27/2018. CEA 6.9 on 11/26/2018. Cycle # 60 FOLFIRI + Avastin was on 12/11/2018. CEA 6.7 on 12/11/2018. Cycle # 61 FOLFIRI + Avastin was on 01/08/2019. CEA 5.6 on 01/07/2019. Cycle # 62 FOLFIRI + Avastin was on 01/29/2019. CEA 4.6 on 01/28/2019. Cycle # 63 FOLFIRI + Avastin was on 02/12/2019. CEA 4.3 on 02/11/2019. CT chest 02/21/2019 no evidence of metastatic disease. CT abdomen/pelvis 02/21/2019 stable hypodense liver lesions. No evidence of worsening metastatic disease. 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 Ordered MRI thoracic spine and referred to neurosurgery team.  Cycle # 64 FOLFIRI + Avastin was on 02/26/2019. CEA 4.7 on 02/25/2019. Cycle # 65 FOLFIRI + Avastin was on 03/12/2019. CEA 4.0 on 03/11/2019. Cycle # 66 FOLFIRI + Avastin was on 03/26/2019. CEA 4.7 on 03/25/2019. Cycle # 67 FOLFIRI + Avastin was on 04/09/2019. CEA 5.6 on 04/08/2019. Cycle # 68 FOLFIRI + Avastin was on 04/30/2019. CEA 4.9 on 04/29/2019. Cycle # 69 FOLFIRI + Avastin was on 05/14/2019. CEA 5.3 on 05/14/2019.    CT chest 05/23/2019 stable small lung nodule with no evidence of metastatic disease. CT abdomen/pelvis 05/23/2019 Decrease conspicuity of the liver lesions. No new lesions seen. Stable splenomegaly. Continue FOLFIRI + Avastin and repeat scans in 3 months. Cycle # 70 FOLFIRI + Avastin was on 06/04/2019. CEA 4.8 on 06/03/2019. Cycle # 71 FOLFIRI + Avastin was on 06/18/2019. CEA 4.6 on 06/17/2019. Cycle # 72 FOLFIRI + Avastin was on 07/09/2019. CEA 4.3 on 07/08/2019. Cycle # 73 FOLFIRI + Avastin was on 07/30/2019. CEA 5.0 on 07/29/2019. Cycle # 74 FOLFIRI + Avastin was on 08/13/2019. CEA 5.0 on 08/12/2019. CT chest 08/20/2019 negative  CT abdomen/pelvis 08/20/2019 Previous identified hepatic lesions are not visualized on the current exam.  Continue FOLFIRI + Avastin and repeat scans in 3 months. Cycle # 75 FOLFIRI + Avastin was on 08/27/2019. CEA 5.0 on 08/26/2019. Cycle # 76 FOLFIRI + Avastin was on 09/17/2019. CEA 5.5 on 09/16/2019. Cycle # 77 FOLFIRI + Avastin was on 10/15/2019. CEA 4.6 on 10/15/2019. Cycle # 78 FOLFIRI + Avastin was on 10/29/2019. CEA 4.1 on 10/28/2019. Cycle # 79 FOLFIRI + Avastin was on 11/12/2019. CEA 4.3 on 11/12/2019. Cycle # 80 FOLFIRI + Avastin was on 12/10/2019. CEA 4.0 on 12/09/2019. CT chest 12/19/2019 Stable 3 mm nodule within the left upper lobe, similar to the previous studies dating back to 11/2/2018, however decreased in size compared to the CT from 3/26/2018. No thoracic LN. CT abdomen/pelvis 12/19/2019 Previously described small hypodense lesions are more conspicuous on the current study compared to the recent study throughout the liver from 8/20/2019, however similar to the prior study from 2/21/2019. Findings may be related to differences in contrast opacification. No abdominal or pelvic lymphadenopathy. Continue FOLFIRI + Avastin and repeat scans in 2 months to f/u on liver lesions. Cycle # 81 FOLFIRI + Avastin was on 01/07/2020. CEA 3.8 on 01/06/2020. Cycle # 82 FOLFIRI + Avastin was on 01/21/2020.   CEA 3.7 on 01/20/2020. Cycle # 83 FOLFIRI + Avastin was on 02/04/2020. CEA 4.1 on 02/03/2020. Cycle # 84 FOLFIRI + Avastin was on 02/18/2020. CEA 4.6 on 02/17/2020. CT chest 2/28/2020 no evidence of metastatic disease to the lungs and no mediastinal or hilar adenopathy. CT abdomen pelvis 2/28/2020 no enhancing lesions seen within the liver to suggest metastatic disease. Wall thickening of the rectosigmoid junction. Images reviewed. Continue FOLFIRI Avastin and repeat scans in 3 months  EGD by Dr. Davina Landrum 03/02/2020 showing no masses or lesions. Cycle # 85 FOLFIRI + Avastin was on 03/03/2020. CEA 7.4 on 03/02/2020. Cycle # 86 FOLFIRI + Avastin was on 03/17/2020. CEA 4.5 on 03/16/2020. Colonoscopy on 03/23/2020 by Dr. Davina aguero (records requested). Cycle # 87 FOLFIRI + Avastin was on 03/31/2020. CEA 4.1 on 03/30/2020. Cycle # 88 FOLFIRI + Avastin was on 04/21/2020. CEA 6.4 on 04/20/2020. Cycle # 89 FOLFIRI + Avastin was on 05/05/2020. CEA 5.8 on 05/04/2020. Cycle # 90 FOLFIRI + Avastin was on 06/02/2020. CEA 5.5 on 06/01/2020. Cycle # 91 FOLFIRI + Avastin was on 06/16/2020. CEA 5.6 on 06/15/2020. CT chest 06/23/2020 noted no metastatic disease in the chest.   CT abdomen/pelvis 06/23/2020 Stable small liver lesions measuring up to 5 mm since 2019.   22 mm round mass in segment 8 of the liver with central high density stable from December 2019), previously measuring up to 34 mm in 2017. No additional metastatic disease in the abdomen or pelvis. Small amount of ascites. Overall stable disease. Continue FOLFIRI + Avastin and repeat scans in 2-3 months. Cycle # 92 FOLFIRI + Avastin was on 07/07/2020. CEA 5.7 on 07/06/2020. Cycle # 93 FOLFIRI + Avastin was on 07/21/2020. CEA 5.9 on 07/20/2020. Cycle # 94 FOLFIRI + Avastin was on 08/04/2020. CEA 5.5 on 08/04/2020. Cycle # 95 FOLFIRI + Avastin was on 08/25/2020. CEA 6.1 on 08/24/2020.   CT chest 9/2/2020 stable unremarkable enhanced CT of the thorax. There is no metastatic disease to the lungs. CT abdomen pelvis on 9/2/2020 stable 2.2 cm low attenuated lesion within the central aspect of the right hepatic lobe favored to represent treated metastatic disease. No new hepatic lesion is identified. Stable splenomegaly  There is no appreciable colonic lesion or stricture. Pericholecystic fluid of uncertain etiology. Laparoscopic cholecystectomy with normal cholangiogram 10/27/20  Gallbladder: Chronic cholecystitis and cholelithiasis.  Unremarkable regional lymph node. 11/13/2020; Seen by Dr. Rhina Dey from General surgery team; cleared to re-start chemotherapy. Cycle # 96 FOLFIRI + Avastin was on 11/17/2020. CEA 3.6 on 11/16/2020. Cycle # 97 FOLFIRI + Avastin was on 12/01/2020. CEA 3.5 on 11/30/2020. Cycle # 98 FOLFIRI + Avastin was on 12/15/2020. CEA 4.3 on 12/15/2020. Cycle # 99 FOLFIRI + Avastin was on 01/05/2021. CEA 4.7 on 01/04/2021. CT chest 01/13/2021 negative for metastatic disease. CT abdomen/pelvis 01/13/2021 Unchanged central hepatic lesion. No specific signs of abdominopelvic metastatic disease. Continue FOLFIRI + Avastin and repeat scans in 3 months. Cycle # 100 FOLFIRI + Avastin was on 01/19/2021. CEA 4.7 on 01/18/2021. Cycle # 101 FOLFIRI + Avastin was on 02/02/2021. CEA 5.2 on 02/01/2021. Cycle # 102 FOLFIRI + Avastin was on 02/16/2021. CEA 5.6 on 02/15/2021. Cycle # 103 FOLFIRI + Avastin is today 03/02/2021. Patient declined dose reduction at this time. He wants to see how this cycle goes and then consider if recurrent sx. RTC 2 weeks for Cycle # 104 FOLFIRI + Avastin.    MSI testing noted no mismatch repair protein loss of expression    3/2/2021  Mike Hermosillo MD  Board Certified Medical Oncologist

## 2021-03-04 ENCOUNTER — HOSPITAL ENCOUNTER (OUTPATIENT)
Dept: INFUSION THERAPY | Age: 71
Discharge: HOME | End: 2021-03-04
Payer: MEDICARE

## 2021-03-04 DIAGNOSIS — Z51.11 ENCOUNTER FOR ANTINEOPLASTIC CHEMOTHERAPY: Primary | ICD-10-CM

## 2021-03-04 DIAGNOSIS — C78.7 RECTAL CANCER METASTASIZED TO LIVER (HCC): ICD-10-CM

## 2021-03-04 DIAGNOSIS — C20 RECTAL CANCER METASTASIZED TO LIVER (HCC): ICD-10-CM

## 2021-03-04 PROCEDURE — 6360000002 HC RX W HCPCS: Performed by: INTERNAL MEDICINE

## 2021-03-04 PROCEDURE — 96372 THER/PROPH/DIAG INJ SC/IM: CPT

## 2021-03-04 RX ADMIN — PEGFILGRASTIM-CBQV 6 MG: 6 INJECTION, SOLUTION SUBCUTANEOUS at 14:41

## 2021-03-15 ENCOUNTER — HOSPITAL ENCOUNTER (OUTPATIENT)
Age: 71
Discharge: HOME | End: 2021-03-15
Payer: MEDICARE

## 2021-03-15 ENCOUNTER — HOSPITAL ENCOUNTER (OUTPATIENT)
Dept: INFUSION THERAPY | Age: 71
End: 2021-03-15
Payer: MEDICARE

## 2021-03-15 DIAGNOSIS — C78.7 RECTAL CANCER METASTASIZED TO LIVER (HCC): ICD-10-CM

## 2021-03-15 DIAGNOSIS — C79.51 BONE METASTASIS (HCC): ICD-10-CM

## 2021-03-15 DIAGNOSIS — C20 RECTAL CANCER METASTASIZED TO LIVER (HCC): ICD-10-CM

## 2021-03-15 LAB
ALBUMIN SERPL-MCNC: 3.5 G/DL (ref 3.5–5.2)
ALP BLD-CCNC: 183 U/L (ref 40–129)
ALT SERPL-CCNC: 14 U/L (ref 0–40)
ANION GAP SERPL CALCULATED.3IONS-SCNC: 13 MMOL/L (ref 7–16)
ANISOCYTOSIS: ABNORMAL
AST SERPL-CCNC: 23 U/L (ref 0–39)
BASOPHILS ABSOLUTE: 0.02 E9/L (ref 0–0.2)
BASOPHILS RELATIVE PERCENT: 1 % (ref 0–2)
BILIRUB SERPL-MCNC: 0.7 MG/DL (ref 0–1.2)
BUN BLDV-MCNC: 21 MG/DL (ref 8–23)
CALCIUM SERPL-MCNC: 9.2 MG/DL (ref 8.6–10.2)
CHLORIDE BLD-SCNC: 105 MMOL/L (ref 98–107)
CO2: 24 MMOL/L (ref 22–29)
CREAT SERPL-MCNC: 1.3 MG/DL (ref 0.7–1.2)
EOSINOPHILS ABSOLUTE: 0.07 E9/L (ref 0.05–0.5)
EOSINOPHILS RELATIVE PERCENT: 3 % (ref 0–6)
GFR AFRICAN AMERICAN: >60
GFR NON-AFRICAN AMERICAN: 54 ML/MIN/1.73
GLUCOSE BLD-MCNC: 119 MG/DL (ref 74–99)
HCT VFR BLD CALC: 25.1 % (ref 37–54)
HEMOGLOBIN: 7.7 G/DL (ref 12.5–16.5)
HYPOCHROMIA: ABNORMAL
LYMPHOCYTES ABSOLUTE: 0.31 E9/L (ref 1.5–4)
LYMPHOCYTES RELATIVE PERCENT: 14 % (ref 20–42)
MCH RBC QN AUTO: 29.7 PG (ref 26–35)
MCHC RBC AUTO-ENTMCNC: 30.7 % (ref 32–34.5)
MCV RBC AUTO: 96.9 FL (ref 80–99.9)
MONOCYTES ABSOLUTE: 0.24 E9/L (ref 0.1–0.95)
MONOCYTES RELATIVE PERCENT: 11 % (ref 2–12)
NEUTROPHILS ABSOLUTE: 1.56 E9/L (ref 1.8–7.3)
NEUTROPHILS RELATIVE PERCENT: 71 % (ref 43–80)
PDW BLD-RTO: 19.9 FL (ref 11.5–15)
PLATELET # BLD: 117 E9/L (ref 130–450)
PMV BLD AUTO: 10.1 FL (ref 7–12)
POLYCHROMASIA: ABNORMAL
POTASSIUM SERPL-SCNC: 3.4 MMOL/L (ref 3.5–5)
RBC # BLD: 2.59 E12/L (ref 3.8–5.8)
SODIUM BLD-SCNC: 142 MMOL/L (ref 132–146)
TOTAL PROTEIN: 6.2 G/DL (ref 6.4–8.3)
WBC # BLD: 2.2 E9/L (ref 4.5–11.5)

## 2021-03-15 PROCEDURE — 36415 COLL VENOUS BLD VENIPUNCTURE: CPT

## 2021-03-15 PROCEDURE — 80053 COMPREHEN METABOLIC PANEL: CPT

## 2021-03-15 PROCEDURE — 85025 COMPLETE CBC W/AUTO DIFF WBC: CPT

## 2021-03-16 ENCOUNTER — HOSPITAL ENCOUNTER (OUTPATIENT)
Dept: INFUSION THERAPY | Age: 71
Discharge: HOME | End: 2021-03-16
Payer: MEDICARE

## 2021-03-16 ENCOUNTER — OFFICE VISIT (OUTPATIENT)
Dept: ONCOLOGY | Age: 71
End: 2021-03-16
Payer: MEDICARE

## 2021-03-16 VITALS
HEIGHT: 73 IN | WEIGHT: 175.6 LBS | SYSTOLIC BLOOD PRESSURE: 120 MMHG | OXYGEN SATURATION: 100 % | TEMPERATURE: 98 F | DIASTOLIC BLOOD PRESSURE: 63 MMHG | HEART RATE: 74 BPM | RESPIRATION RATE: 18 BRPM | BODY MASS INDEX: 23.27 KG/M2

## 2021-03-16 VITALS
RESPIRATION RATE: 16 BRPM | SYSTOLIC BLOOD PRESSURE: 108 MMHG | TEMPERATURE: 97.2 F | DIASTOLIC BLOOD PRESSURE: 62 MMHG | HEART RATE: 63 BPM

## 2021-03-16 DIAGNOSIS — C20 RECTAL CANCER METASTASIZED TO LIVER (HCC): Primary | ICD-10-CM

## 2021-03-16 DIAGNOSIS — C20 RECTAL CANCER (HCC): ICD-10-CM

## 2021-03-16 DIAGNOSIS — C78.7 RECTAL CANCER METASTASIZED TO LIVER (HCC): Primary | ICD-10-CM

## 2021-03-16 DIAGNOSIS — C20 RECTAL CANCER (HCC): Primary | ICD-10-CM

## 2021-03-16 LAB — CEA: 7.2 NG/ML (ref 0–5.2)

## 2021-03-16 PROCEDURE — 99214 OFFICE O/P EST MOD 30 MIN: CPT | Performed by: INTERNAL MEDICINE

## 2021-03-16 PROCEDURE — 82378 CARCINOEMBRYONIC ANTIGEN: CPT

## 2021-03-16 PROCEDURE — 96415 CHEMO IV INFUSION ADDL HR: CPT

## 2021-03-16 PROCEDURE — 6360000002 HC RX W HCPCS: Performed by: INTERNAL MEDICINE

## 2021-03-16 PROCEDURE — 2580000003 HC RX 258: Performed by: INTERNAL MEDICINE

## 2021-03-16 PROCEDURE — 96411 CHEMO IV PUSH ADDL DRUG: CPT

## 2021-03-16 PROCEDURE — 96368 THER/DIAG CONCURRENT INF: CPT

## 2021-03-16 PROCEDURE — 96413 CHEMO IV INFUSION 1 HR: CPT

## 2021-03-16 PROCEDURE — 96417 CHEMO IV INFUS EACH ADDL SEQ: CPT

## 2021-03-16 PROCEDURE — 96375 TX/PRO/DX INJ NEW DRUG ADDON: CPT

## 2021-03-16 RX ORDER — FLUOROURACIL 50 MG/ML
680 INJECTION, SOLUTION INTRAVENOUS ONCE
Status: CANCELLED | OUTPATIENT
Start: 2021-03-16

## 2021-03-16 RX ORDER — SODIUM CHLORIDE 0.9 % (FLUSH) 0.9 %
10 SYRINGE (ML) INJECTION PRN
Status: CANCELLED | OUTPATIENT
Start: 2021-03-16

## 2021-03-16 RX ORDER — SODIUM CHLORIDE 9 MG/ML
250 INJECTION, SOLUTION INTRAVENOUS CONTINUOUS
Status: DISCONTINUED | OUTPATIENT
Start: 2021-03-16 | End: 2021-03-17 | Stop reason: HOSPADM

## 2021-03-16 RX ORDER — SODIUM CHLORIDE 0.9 % (FLUSH) 0.9 %
10 SYRINGE (ML) INJECTION PRN
Status: DISCONTINUED | OUTPATIENT
Start: 2021-03-16 | End: 2021-03-17 | Stop reason: HOSPADM

## 2021-03-16 RX ORDER — DIPHENHYDRAMINE HYDROCHLORIDE 50 MG/ML
50 INJECTION INTRAMUSCULAR; INTRAVENOUS ONCE
Status: CANCELLED | OUTPATIENT
Start: 2021-03-16 | End: 2021-03-16

## 2021-03-16 RX ORDER — FLUOROURACIL 50 MG/ML
650 INJECTION, SOLUTION INTRAVENOUS ONCE
Status: COMPLETED | OUTPATIENT
Start: 2021-03-16 | End: 2021-03-16

## 2021-03-16 RX ORDER — EPINEPHRINE 1 MG/ML
0.3 INJECTION, SOLUTION, CONCENTRATE INTRAVENOUS PRN
Status: CANCELLED | OUTPATIENT
Start: 2021-03-16

## 2021-03-16 RX ORDER — HEPARIN SODIUM (PORCINE) LOCK FLUSH IV SOLN 100 UNIT/ML 100 UNIT/ML
500 SOLUTION INTRAVENOUS PRN
Status: DISCONTINUED | OUTPATIENT
Start: 2021-03-16 | End: 2021-03-17 | Stop reason: HOSPADM

## 2021-03-16 RX ORDER — 0.9 % SODIUM CHLORIDE 0.9 %
10 VIAL (ML) INJECTION ONCE
Status: CANCELLED | OUTPATIENT
Start: 2021-03-16 | End: 2021-03-16

## 2021-03-16 RX ORDER — PALONOSETRON HYDROCHLORIDE 0.05 MG/ML
0.25 INJECTION, SOLUTION INTRAVENOUS ONCE
Status: COMPLETED | OUTPATIENT
Start: 2021-03-16 | End: 2021-03-16

## 2021-03-16 RX ORDER — DEXAMETHASONE SODIUM PHOSPHATE 10 MG/ML
10 INJECTION, SOLUTION INTRAMUSCULAR; INTRAVENOUS ONCE
Status: CANCELLED | OUTPATIENT
Start: 2021-03-16

## 2021-03-16 RX ORDER — ATROPINE SULFATE 0.4 MG/ML
0.4 AMPUL (ML) INJECTION ONCE
Status: COMPLETED | OUTPATIENT
Start: 2021-03-16 | End: 2021-03-16

## 2021-03-16 RX ORDER — PALONOSETRON HYDROCHLORIDE 0.05 MG/ML
0.25 INJECTION, SOLUTION INTRAVENOUS ONCE
Status: CANCELLED | OUTPATIENT
Start: 2021-03-16

## 2021-03-16 RX ORDER — HEPARIN SODIUM (PORCINE) LOCK FLUSH IV SOLN 100 UNIT/ML 100 UNIT/ML
500 SOLUTION INTRAVENOUS PRN
Status: CANCELLED | OUTPATIENT
Start: 2021-03-16

## 2021-03-16 RX ORDER — METHYLPREDNISOLONE SODIUM SUCCINATE 125 MG/2ML
125 INJECTION, POWDER, LYOPHILIZED, FOR SOLUTION INTRAMUSCULAR; INTRAVENOUS ONCE
Status: CANCELLED | OUTPATIENT
Start: 2021-03-16 | End: 2021-03-16

## 2021-03-16 RX ORDER — SODIUM CHLORIDE 9 MG/ML
INJECTION, SOLUTION INTRAVENOUS CONTINUOUS
Status: CANCELLED | OUTPATIENT
Start: 2021-03-16

## 2021-03-16 RX ORDER — DEXAMETHASONE SODIUM PHOSPHATE 10 MG/ML
10 INJECTION INTRAMUSCULAR; INTRAVENOUS ONCE
Status: COMPLETED | OUTPATIENT
Start: 2021-03-16 | End: 2021-03-16

## 2021-03-16 RX ORDER — SODIUM CHLORIDE 9 MG/ML
250 INJECTION, SOLUTION INTRAVENOUS CONTINUOUS
Status: CANCELLED | OUTPATIENT
Start: 2021-03-16 | End: 2021-04-03

## 2021-03-16 RX ORDER — ATROPINE SULFATE 0.4 MG/ML
0.4 AMPUL (ML) INJECTION ONCE
Status: CANCELLED | OUTPATIENT
Start: 2021-03-16 | End: 2021-03-16

## 2021-03-16 RX ADMIN — LEUCOVORIN CALCIUM 650 MG: 10 INJECTION INTRAMUSCULAR; INTRAVENOUS at 10:12

## 2021-03-16 RX ADMIN — Medication 500 UNITS: at 12:29

## 2021-03-16 RX ADMIN — SODIUM CHLORIDE 320 MG: 9 INJECTION, SOLUTION INTRAVENOUS at 10:12

## 2021-03-16 RX ADMIN — BEVACIZUMAB 400 MG: 400 INJECTION, SOLUTION INTRAVENOUS at 09:23

## 2021-03-16 RX ADMIN — SODIUM CHLORIDE, PRESERVATIVE FREE 10 ML: 5 INJECTION INTRAVENOUS at 12:29

## 2021-03-16 RX ADMIN — PALONOSETRON 0.25 MG: 0.25 INJECTION, SOLUTION INTRAVENOUS at 09:01

## 2021-03-16 RX ADMIN — SODIUM CHLORIDE 250 ML: 9 INJECTION, SOLUTION INTRAVENOUS at 09:01

## 2021-03-16 RX ADMIN — DEXAMETHASONE SODIUM PHOSPHATE 10 MG: 10 INJECTION INTRAMUSCULAR; INTRAVENOUS at 09:06

## 2021-03-16 RX ADMIN — ATROPINE SULFATE 0.4 MG: 0.4 INJECTION, SOLUTION INTRAMUSCULAR; INTRAVENOUS; SUBCUTANEOUS at 09:02

## 2021-03-16 RX ADMIN — FLUOROURACIL 650 MG: 50 INJECTION, SOLUTION INTRAVENOUS at 12:23

## 2021-03-18 ENCOUNTER — HOSPITAL ENCOUNTER (OUTPATIENT)
Dept: INFUSION THERAPY | Age: 71
Discharge: HOME | End: 2021-03-18
Payer: MEDICARE

## 2021-03-18 DIAGNOSIS — Z51.11 ENCOUNTER FOR ANTINEOPLASTIC CHEMOTHERAPY: Primary | ICD-10-CM

## 2021-03-18 DIAGNOSIS — C78.7 RECTAL CANCER METASTASIZED TO LIVER (HCC): ICD-10-CM

## 2021-03-18 DIAGNOSIS — C20 RECTAL CANCER METASTASIZED TO LIVER (HCC): ICD-10-CM

## 2021-03-18 PROCEDURE — 6360000002 HC RX W HCPCS: Performed by: INTERNAL MEDICINE

## 2021-03-18 PROCEDURE — 96372 THER/PROPH/DIAG INJ SC/IM: CPT

## 2021-03-18 RX ADMIN — PEGFILGRASTIM-CBQV 6 MG: 6 INJECTION, SOLUTION SUBCUTANEOUS at 14:08

## 2021-03-29 ENCOUNTER — HOSPITAL ENCOUNTER (OUTPATIENT)
Age: 71
Discharge: HOME | End: 2021-03-29
Payer: MEDICARE

## 2021-03-29 LAB
ACANTHOCYTES: ABNORMAL
ALBUMIN SERPL-MCNC: 3.7 G/DL (ref 3.5–5.2)
ALP BLD-CCNC: 206 U/L (ref 40–129)
ALT SERPL-CCNC: 17 U/L (ref 0–40)
ANION GAP SERPL CALCULATED.3IONS-SCNC: 12 MMOL/L (ref 7–16)
ANISOCYTOSIS: ABNORMAL
AST SERPL-CCNC: 26 U/L (ref 0–39)
BASOPHILS ABSOLUTE: 0 E9/L (ref 0–0.2)
BASOPHILS RELATIVE PERCENT: 0.8 % (ref 0–2)
BILIRUB SERPL-MCNC: 0.6 MG/DL (ref 0–1.2)
BUN BLDV-MCNC: 17 MG/DL (ref 8–23)
CALCIUM SERPL-MCNC: 8.8 MG/DL (ref 8.6–10.2)
CEA: 6.5 NG/ML (ref 0–5.2)
CHLORIDE BLD-SCNC: 103 MMOL/L (ref 98–107)
CO2: 23 MMOL/L (ref 22–29)
CREAT SERPL-MCNC: 1.1 MG/DL (ref 0.7–1.2)
EOSINOPHILS ABSOLUTE: 0.14 E9/L (ref 0.05–0.5)
EOSINOPHILS RELATIVE PERCENT: 3.5 % (ref 0–6)
GFR AFRICAN AMERICAN: >60
GFR NON-AFRICAN AMERICAN: >60 ML/MIN/1.73
GLUCOSE BLD-MCNC: 114 MG/DL (ref 74–99)
HCT VFR BLD CALC: 26.6 % (ref 37–54)
HEMOGLOBIN: 8.2 G/DL (ref 12.5–16.5)
HYPOCHROMIA: ABNORMAL
LYMPHOCYTES ABSOLUTE: 0.4 E9/L (ref 1.5–4)
LYMPHOCYTES RELATIVE PERCENT: 9.6 % (ref 20–42)
MCH RBC QN AUTO: 30.4 PG (ref 26–35)
MCHC RBC AUTO-ENTMCNC: 30.8 % (ref 32–34.5)
MCV RBC AUTO: 98.5 FL (ref 80–99.9)
MONOCYTES ABSOLUTE: 0.12 E9/L (ref 0.1–0.95)
MONOCYTES RELATIVE PERCENT: 2.6 % (ref 2–12)
NEUTROPHILS ABSOLUTE: 3.36 E9/L (ref 1.8–7.3)
NEUTROPHILS RELATIVE PERCENT: 84.2 % (ref 43–80)
NUCLEATED RED BLOOD CELLS: 1.8 /100 WBC
OVALOCYTES: ABNORMAL
PDW BLD-RTO: 20.5 FL (ref 11.5–15)
PLATELET # BLD: 131 E9/L (ref 130–450)
PMV BLD AUTO: 10.6 FL (ref 7–12)
POIKILOCYTES: ABNORMAL
POLYCHROMASIA: ABNORMAL
POTASSIUM SERPL-SCNC: 3.2 MMOL/L (ref 3.5–5)
RBC # BLD: 2.7 E12/L (ref 3.8–5.8)
SODIUM BLD-SCNC: 138 MMOL/L (ref 132–146)
TEAR DROP CELLS: ABNORMAL
TOTAL PROTEIN: 6.4 G/DL (ref 6.4–8.3)
WBC # BLD: 4 E9/L (ref 4.5–11.5)

## 2021-03-29 PROCEDURE — 80053 COMPREHEN METABOLIC PANEL: CPT

## 2021-03-29 PROCEDURE — 36415 COLL VENOUS BLD VENIPUNCTURE: CPT

## 2021-03-29 PROCEDURE — 85025 COMPLETE CBC W/AUTO DIFF WBC: CPT

## 2021-03-29 PROCEDURE — 82378 CARCINOEMBRYONIC ANTIGEN: CPT

## 2021-03-30 ENCOUNTER — HOSPITAL ENCOUNTER (OUTPATIENT)
Dept: INFUSION THERAPY | Age: 71
Discharge: HOME | End: 2021-03-30
Payer: MEDICARE

## 2021-03-30 ENCOUNTER — OFFICE VISIT (OUTPATIENT)
Dept: ONCOLOGY | Age: 71
End: 2021-03-30
Payer: MEDICARE

## 2021-03-30 VITALS
OXYGEN SATURATION: 99 % | BODY MASS INDEX: 23.68 KG/M2 | WEIGHT: 178.7 LBS | HEIGHT: 73 IN | RESPIRATION RATE: 18 BRPM | HEART RATE: 72 BPM | SYSTOLIC BLOOD PRESSURE: 123 MMHG | DIASTOLIC BLOOD PRESSURE: 71 MMHG

## 2021-03-30 VITALS
DIASTOLIC BLOOD PRESSURE: 57 MMHG | TEMPERATURE: 97.4 F | HEART RATE: 65 BPM | SYSTOLIC BLOOD PRESSURE: 109 MMHG | RESPIRATION RATE: 16 BRPM

## 2021-03-30 DIAGNOSIS — C20 RECTAL CANCER METASTASIZED TO LIVER (HCC): Primary | ICD-10-CM

## 2021-03-30 DIAGNOSIS — C78.7 RECTAL CANCER METASTASIZED TO LIVER (HCC): Primary | ICD-10-CM

## 2021-03-30 DIAGNOSIS — C79.51 BONE METASTASIS (HCC): ICD-10-CM

## 2021-03-30 DIAGNOSIS — C20 RECTAL CANCER (HCC): Primary | ICD-10-CM

## 2021-03-30 PROCEDURE — 96372 THER/PROPH/DIAG INJ SC/IM: CPT

## 2021-03-30 PROCEDURE — 2580000003 HC RX 258: Performed by: INTERNAL MEDICINE

## 2021-03-30 PROCEDURE — 96411 CHEMO IV PUSH ADDL DRUG: CPT

## 2021-03-30 PROCEDURE — 99214 OFFICE O/P EST MOD 30 MIN: CPT | Performed by: INTERNAL MEDICINE

## 2021-03-30 PROCEDURE — 96368 THER/DIAG CONCURRENT INF: CPT

## 2021-03-30 PROCEDURE — 96415 CHEMO IV INFUSION ADDL HR: CPT

## 2021-03-30 PROCEDURE — 96413 CHEMO IV INFUSION 1 HR: CPT

## 2021-03-30 PROCEDURE — 6360000002 HC RX W HCPCS: Performed by: INTERNAL MEDICINE

## 2021-03-30 PROCEDURE — 96375 TX/PRO/DX INJ NEW DRUG ADDON: CPT

## 2021-03-30 PROCEDURE — 96417 CHEMO IV INFUS EACH ADDL SEQ: CPT

## 2021-03-30 RX ORDER — SODIUM CHLORIDE 0.9 % (FLUSH) 0.9 %
10 SYRINGE (ML) INJECTION PRN
Status: CANCELLED | OUTPATIENT
Start: 2021-03-30

## 2021-03-30 RX ORDER — ATROPINE SULFATE 0.4 MG/ML
0.4 AMPUL (ML) INJECTION ONCE
Status: CANCELLED | OUTPATIENT
Start: 2021-03-30 | End: 2021-03-30

## 2021-03-30 RX ORDER — SODIUM CHLORIDE 0.9 % (FLUSH) 0.9 %
10 SYRINGE (ML) INJECTION PRN
Status: DISCONTINUED | OUTPATIENT
Start: 2021-03-30 | End: 2021-03-31 | Stop reason: HOSPADM

## 2021-03-30 RX ORDER — 0.9 % SODIUM CHLORIDE 0.9 %
10 VIAL (ML) INJECTION ONCE
Status: CANCELLED | OUTPATIENT
Start: 2021-03-30 | End: 2021-03-30

## 2021-03-30 RX ORDER — EPINEPHRINE 1 MG/ML
0.3 INJECTION, SOLUTION, CONCENTRATE INTRAVENOUS PRN
Status: CANCELLED | OUTPATIENT
Start: 2021-03-30

## 2021-03-30 RX ORDER — DIPHENHYDRAMINE HYDROCHLORIDE 50 MG/ML
50 INJECTION INTRAMUSCULAR; INTRAVENOUS ONCE
Status: CANCELLED | OUTPATIENT
Start: 2021-03-30 | End: 2021-03-30

## 2021-03-30 RX ORDER — SODIUM CHLORIDE 9 MG/ML
250 INJECTION, SOLUTION INTRAVENOUS CONTINUOUS
Status: CANCELLED | OUTPATIENT
Start: 2021-03-30 | End: 2021-04-17

## 2021-03-30 RX ORDER — DEXAMETHASONE SODIUM PHOSPHATE 10 MG/ML
10 INJECTION INTRAMUSCULAR; INTRAVENOUS ONCE
Status: COMPLETED | OUTPATIENT
Start: 2021-03-30 | End: 2021-03-30

## 2021-03-30 RX ORDER — SODIUM CHLORIDE 9 MG/ML
250 INJECTION, SOLUTION INTRAVENOUS CONTINUOUS
Status: DISCONTINUED | OUTPATIENT
Start: 2021-03-30 | End: 2021-03-31 | Stop reason: HOSPADM

## 2021-03-30 RX ORDER — METHYLPREDNISOLONE SODIUM SUCCINATE 125 MG/2ML
125 INJECTION, POWDER, LYOPHILIZED, FOR SOLUTION INTRAMUSCULAR; INTRAVENOUS ONCE
Status: CANCELLED | OUTPATIENT
Start: 2021-03-30 | End: 2021-03-30

## 2021-03-30 RX ORDER — FLUOROURACIL 50 MG/ML
680 INJECTION, SOLUTION INTRAVENOUS ONCE
Status: CANCELLED | OUTPATIENT
Start: 2021-03-30

## 2021-03-30 RX ORDER — ATROPINE SULFATE 0.4 MG/ML
0.4 AMPUL (ML) INJECTION ONCE
Status: COMPLETED | OUTPATIENT
Start: 2021-03-30 | End: 2021-03-30

## 2021-03-30 RX ORDER — PALONOSETRON HYDROCHLORIDE 0.05 MG/ML
0.25 INJECTION, SOLUTION INTRAVENOUS ONCE
Status: CANCELLED | OUTPATIENT
Start: 2021-03-30

## 2021-03-30 RX ORDER — PALONOSETRON HYDROCHLORIDE 0.05 MG/ML
0.25 INJECTION, SOLUTION INTRAVENOUS ONCE
Status: COMPLETED | OUTPATIENT
Start: 2021-03-30 | End: 2021-03-30

## 2021-03-30 RX ORDER — DEXAMETHASONE SODIUM PHOSPHATE 10 MG/ML
10 INJECTION, SOLUTION INTRAMUSCULAR; INTRAVENOUS ONCE
Status: CANCELLED | OUTPATIENT
Start: 2021-03-30

## 2021-03-30 RX ORDER — HEPARIN SODIUM (PORCINE) LOCK FLUSH IV SOLN 100 UNIT/ML 100 UNIT/ML
500 SOLUTION INTRAVENOUS PRN
Status: DISCONTINUED | OUTPATIENT
Start: 2021-03-30 | End: 2021-03-31 | Stop reason: HOSPADM

## 2021-03-30 RX ORDER — FLUOROURACIL 50 MG/ML
650 INJECTION, SOLUTION INTRAVENOUS ONCE
Status: COMPLETED | OUTPATIENT
Start: 2021-03-30 | End: 2021-03-30

## 2021-03-30 RX ORDER — HEPARIN SODIUM (PORCINE) LOCK FLUSH IV SOLN 100 UNIT/ML 100 UNIT/ML
500 SOLUTION INTRAVENOUS PRN
Status: CANCELLED | OUTPATIENT
Start: 2021-03-30

## 2021-03-30 RX ORDER — SODIUM CHLORIDE 9 MG/ML
INJECTION, SOLUTION INTRAVENOUS CONTINUOUS
Status: CANCELLED | OUTPATIENT
Start: 2021-03-30

## 2021-03-30 RX ADMIN — PALONOSETRON 0.25 MG: 0.25 INJECTION, SOLUTION INTRAVENOUS at 09:35

## 2021-03-30 RX ADMIN — SODIUM CHLORIDE 250 ML: 9 INJECTION, SOLUTION INTRAVENOUS at 09:35

## 2021-03-30 RX ADMIN — BEVACIZUMAB 400 MG: 400 INJECTION, SOLUTION INTRAVENOUS at 09:58

## 2021-03-30 RX ADMIN — LEUCOVORIN CALCIUM 650 MG: 10 INJECTION INTRAMUSCULAR; INTRAVENOUS at 10:45

## 2021-03-30 RX ADMIN — DENOSUMAB 120 MG: 120 INJECTION SUBCUTANEOUS at 10:49

## 2021-03-30 RX ADMIN — Medication 500 UNITS: at 13:01

## 2021-03-30 RX ADMIN — FLUOROURACIL 650 MG: 50 INJECTION, SOLUTION INTRAVENOUS at 12:56

## 2021-03-30 RX ADMIN — ATROPINE SULFATE 0.4 MG: 0.4 INJECTION, SOLUTION INTRAMUSCULAR; INTRAVENOUS; SUBCUTANEOUS at 09:37

## 2021-03-30 RX ADMIN — SODIUM CHLORIDE 320 MG: 9 INJECTION, SOLUTION INTRAVENOUS at 10:45

## 2021-03-30 RX ADMIN — DEXAMETHASONE SODIUM PHOSPHATE 10 MG: 10 INJECTION INTRAMUSCULAR; INTRAVENOUS at 09:40

## 2021-03-30 RX ADMIN — SODIUM CHLORIDE, PRESERVATIVE FREE 10 ML: 5 INJECTION INTRAVENOUS at 13:01

## 2021-03-30 NOTE — PROGRESS NOTES
Patient presents to clinic today for Xgeva injection. Patient's labs monitored, specifically, Calcium level, to ensure no increased risk of hypocalcemia with administration of the medication. Patient's calcium level is 8.8 mg/dL. Patient received therapy and will continue to be monitored prior to each dose.      [place], [place] 3/30/2021 9:39 AM

## 2021-03-30 NOTE — PROGRESS NOTES
[place] [id number]  Attending Clinic Note     Reason for Visit: Follow-up on a patient with Metastatic Rectal Cancer.     PCP: Breanna Craig MD     History of Present Illness:  68 y/o  male who was referred to see Dr. Mark Ho (GI team) for evaluation of bright red blood per rectum, mild anemia and change in bowel habits with diarrhea. CEA 2640 on 10/19/2015. AlcP 299 AST 57 ALT 75 on 10/19/2015. Colonoscopy in 2013 noted no significant polyps, colitis or lesions at that time. Denies any Family History of colorectal cancer or polyps.     Colonoscopy on 10/19/2015 revealed:  1. Ascending polyp, 8 mm, hot snare: Tubulovillous adenoma. 2. Transverse polyp, 1 cm, hot snare: Serrated polyp most consistent with sessile serrated adenoma. 3. Five splenic flexure polyps, three - 5 mm, 7 mm, 8 mm, hot snare and biopsy: Four segments of Tubular Adenoma  4. Descending polyp, 5 mm, biopsy: Tubular adenoma. 5. Sigmoid polyp, 4 mm biopsy, Serrated polyp most consistent with sessile serrated adenoma. 6. Two rectal polyps, 4 mm biopsy: Serrated polyp most consistent with hyperplastic polyp. 7. Rectosigmoid colon mass (large mass approximately 65% circumference of the lumen; Very friable, firm and hard): Tubulovillous adenoma with associated focal erosion and fibroplasia.      CT scan abdomen/pelvis on 10/26/2015:  1. Small nodules at lung bases likely represent metastatic colon   cancer. 2. Extensive likely metastatic colon cancer throughout the liver.      3. Mild mural thickening and luminal narrowing in the   terminal ileum, otherwise nonspecific.      Colonoscopy with snare removal rectal mass was performed by Dr. Linda Mercado. Pathology proved:  Rectal polyp: Invasive adenocarcinoma involving villous adenoma and extending to the cauterized edge of excision. KRAS Mutation: Mutation detected.   BRAF Mutation: Mutation not detected. 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     CEA 3488. Bone scan on 11/10/2015 noted no metastatic disease. CT chest on 11/10/2015 revealed Multiple pulmonary nodules in the upper and lower lobes consistent with metastatic disease;   Hepatic metastasis also visualized. 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 Mediport was placed. Cycle # 1 of FOLFOX + Avastin was on 11/23/2015. CEA was 4555 on 11/23/2015. Cycle # 2 of FOLFOX + Avastin was on 12/08/2015. CEA was 3160 on 12/08/2015. Cycle # 3 of FOLFOX + Avastin was on 12/22/2015. CEA was 2516 on 12/21/2015. Cycle # 4 of FOLFOX + Avastin was on 01/05/2016. CEA was 2098 on 01/05/2015. Cycle # 5 of FOLFOX + Avastin was on 01/19/2016. CEA was 1511 on 01/05/2015.     -Bone scan on 01/26/2016 noted no metastatic disease. CT chest on 01/26/2016 revealed Significant response to treatment with no visible residual nodules. CT scan abdomen/pelvis on 01/26/2016 noted Interval decreased size of multiple masses in the liver compatible with treatment response. Continue another 2 months of FOLFOX + Avastin and repeat scans. Cycle # 6 of FOLFOX + Avastin was on 02/02/2016.  on 02/02/2016. Cycle # 7 of FOLFOX + Avastin was on 02/16/2016.  on 02/16/2016. Cycle # 8 of FOLFOX + Avastin was on 03/01/2016.  on 03/01/2016. Cycle # 9 of FOLFOX + Avastin was on 03/15/2016.  on 03/15/2016. Cycle # 10 of FOLFOX + Avastin was on 03/29/2016. .4 on 03/29/2016. Cycle # 11 of FOLFOX + Avastin was on 04/12/2016. .4 on 04/12/2016.     Re-staging scans on 04/19/2016: CT Chest: clear lungs; no evidence of recurring pulmonary nodule; CT Abdomen/Pelvis: Further interval decrease in size of the multiple metastatic hepatic lesions, the largest lesion now measures 3.9 x 3.5 cm and previously measured 4.5 cm in maximum diameter.  No mesenteric lymphadenopathy is identified; Bone Scan: No evidence of osseous metastasis. Continue same regimen and re-stage in 2-3 months. Cycle # 12 FOLFOX + Avastin was on 04/26/2016. .5 on 04/26/2016. Cycle # 13 FOLFOX + Avastin was on 05/10/2016. .3 on 05/10/2016. Cycle # 14 FOLFOX+AVASTIN was on 05/24/2016. .5 on 05/24/2016. Cycle # 15 FOLFOX + Avastin was on 06/07/2016. CEA 90.5 on 06/07/2016. Ad[facility] 06/13/2016-06/16/2016 for abdominal pain: EGD noted 1.5 cm clean based duodenal bulb ulceration s/p epinephrine and bicap per Dr. Gay Enriquez. No active bleeding. A. Stomach, biopsy: Mild chronic gastritis, immunostain negative for Helicobacter  B. Esophagus, biopsy: Gastric glandular mucosa with prominent intestinal metaplasia ([place]'s epithelium), negative for epithelial dysplasia, esophageal squamous mucosa not identified  Cycle # 16 FOLFOX (discontinued avastin (bevacizumab) given association of peptic ulcer disease and known association of GI perforation) was on 06/21/2016. Cycle # 17 FOLFOX was on 07/05/2016. CEA 52.6 on 07/19/2016. Cycle # 17 FOLFOX was on 07/05/2016. CEA 52.6 on 07/05/2016. CEA 47.6 on 07/19/2016.     Re-staging scans 07/19/2106: CT Chest negative for metastatic disease. CT Abdomen/Pelvis: Stable hepatic lesions; Question new mural thickening in the cecum. Bone Scan: New Let hip lesion suspicious for bone metastasis.     Increased CEA; new mural thickening in the cecum and new left hip lesion suspicious for bone metastasis are consistent with disease progression; He derived maximum benefit from FOLFOX/AVASTIN. D/C FOLFOX/AVASTIN. We recommended FOLFIRI second line therapy. Cycle # 1 FOLFIRI was on 08/02/2016. CEA 40.8 on 08/02/2016. Xgeva q4 weeks started on 08/02/2016. Cycle # 2 FOLFIRI was on 08/16/2016. CEA 31.7 on 08/16/2016. Cycle # 3 FOLFIRI (added Avastin) was on 08/30/2016 given that ulcers healed on EGD 08/15/2016 by Dr. Bill Kaplan; Protonix bid since avastin re-started.    Colonoscopy on 08/29/2016 (to look at the cecal area) unremarkable. CEA 26.7 on 08/30/2016. Cycle # 4 FOLFIRI/Avastin was on 09/13/2016. CEA 23.4 on 09/13/2016. Cycle # 5 FOLFIRI/Avastin was on 09/27/2016. CEA 22.3 on 09/27/2016. Cycle # 6 FOLFIRI/Avastin was on 10/11/2016. CEA 18.4 on 10/11/2016.      Bone scan 10/21/2016 stable bone metastasis; ? New lesion anterior left sixth rib likely interval healing fracture. CT abdomen/pelvis revealed stable hepatic metastasis. CT chest negative for metastatic disease. Continue FOLFIRI/Avastin and repeat scans in 3 months. Cycle # 7 FOLFIRI/Avastin was on 10/25/2016. CEA 16.1  Cycle # 8 FOLFIRI/Avastin was on 11/08/2016. CEA 15.7  Cycle # 9 FOLFIRI/Avastin was on 11/29/2016. CEA 13.6 on 11/29/2016. Cycle # 10 FOLFIRI/Avastin was on 12/13/2016. CEA 10.6 on 12/13/2016. Cycle # 11 FOLFIRI/Avastin was on 12/27/2016. CEA 11.1 on 12/27/2016. Cycle # 12 FOLFIRI/Avastin was on 01/10/2017. CEA 9.7 on 01/10/2017.       CT chest 01/23/2017 No new pulmonary nodule or lymphadenopathy. Patchy groundglass opacities within the left lower lobe, suggestive of infectious or inflammatory etiology. Followup CT chest in 3 months. Slight increased linear sclerosis along the left anterior sixth rib corresponding to an area of increased uptake on the prior bone scan from 10/21/2016, favored to be related to interval healing changes of a nondisplaced fracture. CT abdomen/pelvis on 01/23/2017 Redemonstration of multiple hepatic metastases, which are similar compared to the prior CT from 10/21/2016. Redemonstration of area of increased sclerosis along the right acetabulum suspicious for metastatic disease. Bone scan on 01/23/2017 Stable subtle uptake within the left proximal diaphysis, again suggestive of metastatic disease  Decreased subtle uptake within the medial right acetabulum.    Decreased uptake within the left anterior sixth rib corresponding to linear sclerosis on the recent CT, favored to be related to an joint rib fracture. Continue FOLFIRI + Avastin and repeat scans in 3 months. Cycle # 13 FOLFIRI + Avastin was on 01/24/2017. Cycle # 14 FOLFIRI + Avastin was on 02/07/2017. Cycle # 15 FOLFIRI + Avastin was on 02/21/2017. Cycle # 16 FOLFIRI + Avastin was on 03/07/2017. Cycle # 17 FOLFIRI + Avastin was on 03/21/2017. Cycle # 18 FOLFIRI + Avastin was on 04/04/2017. CT chest 04/17/2017 negative for metastatic disease. CT abdomen/pelvis 04/17/2017 Stable hypodense metastatic lesions within the liver. Stable area of increased sclerosis along the right acetabulum  Bone scan 04/17/2017 Stable subtle uptake within the left proximal femoral diaphysis; No definite abnormal uptake in the region of a sclerotic lesion along the posterior column of the right acetabulum noted on CT. Stable slight uptake within the left anterior sixth rib corresponding to linear sclerosis on the recent CT, favored to be related to a fracture. Continue FOLFIRI + Avastin and repeat scans in 3 months. Cycle # 19 FOLFIRI + Avastin was on 04/18/2017. Cycle # 20 FOLFIRI + Avastin was on 05/02/2017. Cycle # 21 FOLFIRI + Avastin was on 05/16/2017. Cycle # 22 FOLFIRI + Avastin was on 05/30/2017. Cycle # 23 FOLFIRI + Avastin was on 06/13/2017. Cycle # 24 FOLFIRI + Avastin was on 06/27/2017. Cycle # 25 FOLFIRI + Avastin was on 07/11/2017. Cycle # 26 FOLFIRI + Avastin was on 07/25/2017. Cycle # 27 FOLFIRI + Avastin was on 08/08/2017. Cycle # 28 FOLFIRI + Avastin was on 08/22/2017. Cycle # 29 FOLFIRI + Avastin was on 09/05/2017. Cycle # 30 FOLFIRI + Avastin was on 09/19/2017. Bone scan 09/26/2017 stable. CT abdomen/pelvis on 09/26/2017 Stable hypodense mass in the liver. Stable sclerotic lesion in the acetabulum. CT chest 09/26/2017 negative for metastatic disease. Cycle # 31 FOLFIRI + Avastin was on 10/03/2017. CEA 7.4 on 10/03/2017. Cycle # 32 FOLFIRI + Avastin was on 10/17/2017.  CEA 6.0 on 10/17/2017. Cycle # 33 FOLFIRI + Avastin was on 10/31/2017. CEA 6.8 on 10/31/2017. Cycle # 34 FOLFIRI + Avastin was on 11/14/2017. CEA 7.2 on 11/14/2017. Cycle # 35 FOLFIRI + Avastin was on 11/28/2017. CEA 5.1 on 11/28/2017. Cycle # 36 FOLFIRI + Avastin was on 12/12/2017. CEA 6.1 on 12/12/2017. Bone scan 12/22/2017 noted no evidence of metastatic disease to the axial or appendicular skeleton. CT chest 12/22/2017 noted no evidence of metastatic disease. CT abdomen/pelvis 12/22/2017 noted stable hypodense lesions in the liver. Continue FOLFIRI + Avastin and repeat scans in 3 months. Cycle # 37 FOLFIRI + Avastin was on 12/27/2018. Cycle # 38 FOLFIRI + Avastin was on 01/09/2018. Cycle # 39 FOLFIRI + Avastin was on 01/23/2018. Cycle # 40 FOLFIRI + Avastin was on 02/06/2018. Cycle # 41 FOLFIRI + Avastin was on 02/20/2018. Cycle # 42 FOLFIRI + Avastin was on 03/06/2018. Cycle # 43 FOLFIRI + Avastin was on 03/20/2018. Bone scan on 03/26/2018 negative for metastatic disease. CT chest 03/26/2018 noted ? new 7 mm nodule in LUCY. CT abdomen/pelvis 03/26/2018 noted stable hypodense lesions in the liver. ? New small ascites in the abdomen. Patient wants to continue to monitor the lung finding and repeat scans in 2 months instead of changing the current regimen into oral Lonsurf. Cycle # 44 FOLFIRI + Avastin was on 04/03/2018. CEA 6.3 on 04/03/2018. Cycle # 45 FOLFIRI + Avastin was on 04/24/2018. CEA 5.3 on 04/24/2018. Cycle # 46 FOLFIRI + Avastin was on 05/08/2018. CEA 5.4 on 05/08/2018. Cycle # 47 FOLFIRI + Avastin was on 05/22/2018. CEA 6.1 on 05/22/2018. CT chest 05/31/2018 noted stable nodule in LUCY. No evidence of worsening malignancy. CT abdomen/pelvis on 05/31/2018 noted stable liver metastasis. No evidence of worsening malignancy. Continue FOLFIRI + Avastin and repeat scans in 3 months. Cycle # 48 FOLFIRI + Avastin was on 06/06/2018. Cycle # 49 FOLFIRI + Avastin was on 06/19/2018. Cycle # 50 FOLFIRI + Avastin was on 07/03/2018. Cycle # 51 FOLFIRI + Avastin was on 07/24/2018. Cycle # 52 FOLFIRI + Avastin was on 08/14/2018. Cycle # 53 FOLFIRI + Avastin was on 08/28/2018. CT chest 09/06/2018 noted interval decrease in size of LUCY measuring up to 4 mm, suggestive of treatment response. No mediastinal or hilar LN  CT abdomen/pelvis 09/06/2018 Slight interval increase in size of a previously noted metastatic lesion within the right hepatic lobe. This may be related to differences in phase of enhancement compared to the most recent study from 5/31/2018, the lesion remains decreased in size compared to the more previous studies. No abdominal, retroperitoneal, or pelvic lymphadenopathy. Continue FOLFIRI + Avastin and repeat scans in 2 months. Cycle # 54 FOLFIRI + Avastin was on 09/11/2018. Cycle # 55 FOLFIRI + Avastin was on 09/25/2018. Cycle # 56 FOLFIRI + Avastin was on 10/09/2018. Cycle # 57 FOLFIRI + Avastin was on 10/23/2018. CT chest 11/02/2018 stable 3 mm nodule within LUCY. No new right and left lung nodule. No mediastinal or osseous lesion. CT abdomen/pelvis 11/02/2018 stable metastatic disease to liver. No new metastatic disease identified. No mesenteric or retroperitoneal LN. No gross colonic lesion identified. Continue FOLFIRI + Avastin and repeat scans in 3 months. Cycle # 58 FOLFIRI + Avastin was on 11/06/2018. CEA 7.6 on 11/05/2018. Cycle # 59 FOLFIRI + Avastin was on 11/27/2018. CEA 6.9 on 11/26/2018. Cycle # 60 FOLFIRI + Avastin was on 12/11/2018. CEA 6.7 on 12/11/2018. Cycle # 61 FOLFIRI + Avastin was on 01/08/2019. CEA 5.6 on 01/07/2019. Cycle # 62 FOLFIRI + Avastin was on 01/29/2019. CEA 4.6 on 01/28/2019. Cycle # 63 FOLFIRI + Avastin was on 02/12/2019. CEA 4.3 on 02/11/2019. CT chest 02/21/2019 no evidence of metastatic disease. CT abdomen/pelvis 02/21/2019 stable hypodense liver lesions. No evidence of worsening metastatic disease.  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 Ordered MRI thoracic spine and referred to neurosurgery team.    Cycle # 64 FOLFIRI + Avastin was on 02/26/2019. CEA 4.7 on 02/25/2019. Cycle # 65 FOLFIRI + Avastin was on 03/12/2019. CEA 4.0 on 03/11/2019. Cycle # 66 FOLFIRI + Avastin was on 03/26/2019. CEA 4.7 on 03/25/2019. Cycle # 67 FOLFIRI + Avastin was on 04/09/2019. CEA 5.6 on 04/08/2019. Cycle # 68 FOLFIRI + Avastin was on 04/30/2019. CEA 4.9 on 04/29/2019. Cycle # 69 FOLFIRI + Avastin was on 05/14/2019. CEA 5.3 on 05/14/2019. CT chest 05/23/2019 stable small lung nodule with no evidence of metastatic disease. CT abdomen/pelvis 05/23/2019 Decrease conspicuity of the liver lesions. No new lesions seen. Stable splenomegaly. Continue FOLFIRI + Avastin and repeat scans in 3 months. Cycle # 70 FOLFIRI + Avastin was on 06/04/2019. CEA 4.8 on 06/03/2019. Cycle # 71 FOLFIRI + Avastin was on 06/18/2019. CEA 4.6 on 06/17/2019. Cycle # 72 FOLFIRI + Avastin was on 07/09/2019. CEA 4.3 on 07/08/2019. Cycle # 73 FOLFIRI + Avastin was on 07/30/2019. CEA 5.0 on 07/29/2019. Cycle # 74 FOLFIRI + Avastin was on 08/13/2019. CEA 5.0 on 08/12/2019. CT chest 08/20/2019 negative  CT abdomen/pelvis 08/20/2019 Previous identified hepatic lesions are not visualized on the current exam.  Continue FOLFIRI + Avastin and repeat scans in 3 months. Cycle # 75 FOLFIRI + Avastin was on 08/27/2019. CEA 5.0 on 08/26/2019. Cycle # 76 FOLFIRI + Avastin was on 09/17/2019. CEA 5.5 on 09/16/2019. Cycle # 77 FOLFIRI + Avastin was on 10/15/2019. CEA 4.6 on 10/15/2019. Cycle # 78 FOLFIRI + Avastin was on 10/29/2019. CEA 4.1 on 10/28/2019. Cycle # 79 FOLFIRI + Avastin was on 11/12/2019. CEA 4.3 on 11/12/2019. Cycle # 80 FOLFIRI + Avastin was on 12/10/2019. CEA 4.0 on 12/09/2019.   CT chest 12/19/2019 Stable 3 mm nodule within the left upper lobe, similar to the previous studies dating back to 11/2/2018, however decreased in size compared to the CT from 3/26/2018. No thoracic LN. CT abdomen/pelvis 12/19/2019 Previously described small hypodense lesions are more conspicuous on the current study compared to the recent study throughout the liver from 8/20/2019, however similar to the prior study from 2/21/2019. Findings may be related to differences in contrast opacification. No abdominal or pelvic lymphadenopathy. Continue FOLFIRI + Avastin and repeat scans in 2 months to f/u on liver lesions. Cycle # 81 FOLFIRI + Avastin was on 01/07/2020. CEA 3.8 on 01/06/2020. Cycle # 82 FOLFIRI + Avastin was on 01/21/2020. CEA 3.7 on 01/20/2020. Cycle # 83 FOLFIRI + Avastin was on 02/04/2020. CEA 4.1 on 02/03/2020. Cycle # 84 FOLFIRI + Avastin was on 02/18/2020. CEA 4.6 on 02/17/2020. EGD by Dr. Radha Mata 03/02/2020 showing no masses or lesions  Cycle # 85 FOLFIRI + Avastin was on 03/03/2020. CEA 7.4 on 03/02/2020. Cycle # 86 FOLFIRI + Avastin was on 03/17/2020. CEA 4.5 on 03/16/2020. Colonoscopy on 03/23/2020 by Dr. Radha Mata unremarkable (records requested). Cycle # 87 FOLFIRI + Avastin was on 03/31/2020. CEA 4.1 on 03/30/2020. Cycle # 88 FOLFIRI + Avastin was on 04/21/2020. CEA 6.4 on 04/20/2020. Cycle # 89 FOLFIRI + Avastin was on 05/05/2020. CEA 5.8 on 05/04/2020. Cycle # 90 FOLFIRI + Avastin was on 06/02/2020. CEA 5.5 on 06/01/2020. Cycle # 91 FOLFIRI + Avastin was on 06/16/2020. CEA 5.6 on 06/15/2020. CT chest 06/23/2020 noted no metastatic disease in the chest.   CT abdomen/pelvis 06/23/2020 Stable small liver lesions measuring up to 5 mm since 2019.   22 mm round mass in segment 8 of the liver with central high density stable from December 2019), previously measuring up to 34 mm in 2017. No additional metastatic disease in the abdomen or pelvis. Small amount of ascites. Overall stable disease. Continue FOLFIRI + Avastin and repeat scans in 2-3 months. Cycle # 92 FOLFIRI + Avastin was on 07/07/2020.  CEA 5.7 on 07/06/2020. Cycle # 93 FOLFIRI + Avastin was on 07/21/2020. CEA 5.9 on 07/20/2020. Cycle # 94 FOLFIRI + Avastin was on 08/04/2020. CEA 5.5 on 08/04/2020. Cycle # 95 FOLFIRI + Avastin was on 08/25/2020. CEA 6.1 on 08/24/2020. CT chest 9/2/2020 stable unremarkable enhanced CT of the thorax. There is no metastatic disease to the lungs. CT abdomen pelvis on 9/2/2020 stable 2.2 cm low attenuated lesion within the central aspect of the right hepatic lobe favored to represent treated metastatic disease. No new hepatic lesion is identified. Stable splenomegaly  There is no appreciable colonic lesion or stricture  Pericholecystic fluid of uncertain etiology. General surgery team consulted. Laparoscopic cholecystectomy with normal cholangiogram 10/27/20  Gallbladder: Chronic cholecystitis and cholelithiasis.  Unremarkable regional lymph node. 11/13/2020; Seen by Dr. Kashif Vo from General surgery team; cleared to re-start chemotherapy. Cycle # 96 FOLFIRI + Avastin was on 11/17/2020. CEA 3.6 on 11/16/2020. Cycle # 97 FOLFIRI + Avastin was on 12/01/2020. CEA 3.5 on 11/30/2020. Cycle # 98 FOLFIRI + Avastin was on 12/15/2020. CEA 4.3 on 12/15/2020. Cycle # 99 FOLFIRI + Avastin was on 01/05/2021. CEA 4.7 on 01/04/2021. CT chest 01/13/2021 negative for metastatic disease. CT abdomen/pelvis 01/13/2021 Unchanged central hepatic lesion. No specific signs of abdominopelvic metastatic disease. Continue FOLFIRI + Avastin and repeat scans in 3 months. Cycle # 100 FOLFIRI + Avastin was on 01/19/2021. CEA 4.7 on 01/18/2021. Cycle # 101 FOLFIRI + Avastin was on 02/02/2021. CEA 5.2 on 02/01/2021. Cycle # 102 FOLFIRI + Avastin was on 02/16/2021. CEA 5.6 on 02/15/2021. Cycle # 103 FOLFIRI + Avastin was on 03/02/2021. Cycle # 104 FOLFIRI (20% dose reduced due to significant toxicity) + Avastin was on 03/16/2021. Cycle # 105 FOLFIRI (same dose as cycle # 104) + Avastin is today 03/30/2021.  CEA 6.5 on 03/29/2021. Today 03/30/2021. No fever, chills. improved fatigue. Felt better with chemo dose reduction. Review of Systems;  CONSTITUTIONAL: No fever, chills. Fair appetite. improved fatigue. ENMT: Eyes: No diplopia; Nose: No epistaxis. Mouth:No lesions  RESPIRATORY: No hemoptysis, shortness of breath. CARDIOVASCULAR: No chest pain, palpitations. GASTROINTESTINAL:No N/V   GENITOURINARY: No dysuria, urinary frequency, hematuria. NEURO: No syncope, presyncope, headache. Remainder ROS: Negative. Past Medical History:   Past Medical History               Diagnosis Date    Arthritis      Cancer ([facility] [address]         colon    Depression      Hyperlipidemia      Hypertension           Medications:  Reviewed and reconciled.     Allergies: Allergies   Allergen Reactions    Neosporin [Neomycin-Polymyxin-Gramicidin] Rash    Tape Adriano Onward Tape] Rash      Physical Exam:  /71   Pulse 72   Resp 18   Ht 6' 1\" (1.854 m)   Wt 178 lb 11.2 oz (81.1 kg)   SpO2 99%   BMI 23.58 kg/m²   GENERAL: Alert, oriented x 3, not in distress  HEENT: PERRLA, EOMI. NECK: Supple. Without lymphadenopathy. LUNGS: Lungs are CTA bilaterally, with no wheezing, crackles or rhonchi. CARDIOVASCULAR: Regular rhythm. No murmurs, rubs or gallops. ABDOMEN: Soft. Non-tender, non-distended. Positive bowel sounds. EXTREMITIES: Without clubbing, cyanosis or edema. Skin toxicity in palms  NEUROLOGIC: No focal deficits.    ECOG PS 1    Diagnostics:  Lab Results   Component Value Date    WBC 4.0 (L) 03/29/2021    HGB 8.2 (L) 03/29/2021    HCT 26.6 (L) 03/29/2021    MCV 98.5 03/29/2021     03/29/2021     Lab Results   Component Value Date     03/29/2021    K 3.2 (L) 03/29/2021     03/29/2021    CO2 23 03/29/2021    BUN 17 03/29/2021    CREATININE 1.1 03/29/2021    GLUCOSE 114 (H) 03/29/2021    CALCIUM 8.8 03/29/2021    PROT 6.4 03/29/2021    LABALBU 3.7 03/29/2021    BILITOT 0.6 03/29/2021    ALKPHOS 206 (H) 03/29/2021    AST 26 03/29/2021    ALT 17 03/29/2021    LABGLOM >60 03/29/2021    GFRAA >60 03/29/2021     Lab Results   Component Value Date    CEA 6.5 (H) 03/29/2021     Impression/Plan:  68 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 KRAS Mutation: Mutation detected. BRAF Mutation: Mutation not detected. 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 CT scan abdomen/pelvis on 10/26/2015 revealed small nodules at the lung bases, extensive liver lesions consistent with metastatic rectosigmoid cancer. CEA 3488 on 10/30/2015. Bone scan on 11/10/2015 noted no metastatic disease. CT chest on 11/10/2015 revealed Multiple pulmonary nodules in the upper and lower lobes consistent with metastatic disease; Hepatic metastasis also visualized. 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 Mediport was placed. Cycle # 1 of FOLFOX + Avastin was on 11/23/2015. CEA was 4555 on 11/23/2015. Cycle # 2 of FOLFOX + Avastin was on 12/08/2015. CEA was 3160 on 12/08/2015. Cycle # 3 of FOLFOX + Avastin was on 12/22/2015. CEA was 2516 on 12/21/2015. Cycle # 4 of FOLFOX + Avastin was on 01/05/2016. CEA was 2098 on 01/05/2015. Cycle # 5 of FOLFOX + Avastin was on 01/19/2016. CEA was 1511 on 01/05/2015.     -Bone scan on 01/26/2016 noted no metastatic disease. CT chest on 01/26/2016 revealed Significant response to treatment with no visible residual nodules. CT scan abdomen/pelvis on 01/26/2016 noted Interval decreased size of multiple masses in the liver compatible with treatment response. Continue another 2 months of FOLFOX + Avastin and repeat scans. Cycle # 6 of FOLFOX + Avastin was on 02/02/2016.  on 02/02/2016. Cycle # 7 of FOLFOX + Avastin was on 02/16/2016.  on 02/16/2016. Cycle # 8 of FOLFOX + Avastin was on 03/01/2016.  on 03/01/2016. Cycle # 9 of FOLFOX + Avastin was on 03/15/2016.  on 03/15/2016.   Cycle # 10 of FOLFOX + Avastin was on 03/29/2016. .4 on 03/29/2016. Cycle # 11 of FOLFOX + Avastin was on 04/12/2016. .4 on 04/12/2016.     Re-staging scans on 04/19/2016: CT Chest: clear lungs; no evidence of recurring pulmonary nodule; CT Abdomen/Pelvis: Further interval decrease in size of the multiple metastatic hepatic lesions, the largest lesion now measures 3.9 x 3.5 cm and previously measured 4.5 cm in maximum diameter. No mesenteric lymphadenopathy is identified; Bone Scan: No evidence of osseous metastasis. Continue same regimen and re-stage in 2-3 months. Cycle # 12 FOLFOX + Avastin was on 04/26/2016. .5 on 04/26/2016. Cycle # 13 FOLFOX + Avastin was on 05/10/2016. .3 on 05/10/2016. Cycle # 14 FOLFOX+AVASTIN was on 05/24/2016. .5 on 05/24/2016. Cycle # 15 FOLFOX + Avastin was on 06/07/2016. CEA 90.5 on 06/07/2016. Ad[facility] 06/13/2016-06/16/2016 for abdominal pain: EGD noted 1.5 cm clean based duodenal bulb ulceration s/p epinephrine and bicap per Dr. Sharla Varma. No active bleeding. A. Stomach, biopsy: Mild chronic gastritis, immunostain negative for Helicobacter  B. Esophagus, biopsy: Gastric glandular mucosa with prominent ntestinal metaplasia ([place]'s epithelium), negative for epithelial dysplasia, esophageal squamous mucosa not identified     Cycle # 16 FOLFOX (discontinued avastin (bevacizumab) given association of peptic ulcer disease and known association of GI perforation.) was on 06/21/2016. CEA 47 on 06/21/2016. Cycle # 17 FOLFOX was on 07/05/2016. CEA 52.6 on 07/05/2016. CEA 47.6 on 07/19/2016.     Re-staging scans 07/19/2106: CT Chest negative for metastatic disease. CT Abdomen/Pelvis: Stable hepatic lesions; Question new mural thickening in the cecum. Bone Scan: New Let hip lesion suspicious for bone metastasis.        Increased CEA; new mural thickening in the cecum and new left hip lesion suspicious for bone metastasis are consistent with disease progression; He derived maximum benefit from FOLFOX/AVASTIN. D/C FOLFOX/AVASTIN. We recommended FOLFIRI second line therapy. Cycle # 1 FOLFIRI was on 08/02/2016. CEA 40.8 on 08/02/2016. Xgeva q4 weeks started on 08/02/2016. Cycle # 2 FOLFIRI was on 08/16/2016. CEA 31.7 on 08/16/2016. Cycle # 3 FOLFIRI (added Avastin) was on 08/30/2016 given that ulcers healed on EGD 08/15/2016 by Dr. Wili Sweeney; Protonix bid since avastin re-started. Colonoscopy on 08/29/2016 (to look at the cecal area) unremarkable. CEA 26.7 on 08/30/2016. Cycle # 4 FOLFIRI/Avastin was on 09/13/2016. CEA 23.4 on 09/13/2016. Cycle # 5 FOLFIRI/Avastin was on 09/27/2016. CEA 22.3 on 09/27/2016. Cycle # 6 FOLFIRI/Avastin was on 10/11/2016. CEA 18.4 on 10/11/2016.      Bone scan 10/21/2016 stable bone metastasis; ? New lesion anterior left sixth rib likely interval healing fracture. CT abdomen/pelvis revealed stable hepatic metastasis. CT chest negative for metastatic disease. Continue FOLFIRI/Avastin and repeat scans in 3 months. Cycle # 7 FOLFIRI/Avastin was on 10/25/2016. CEA 16.1 on 10/25/2016. Cycle # 8 FOLFIRI/Avastin was on 11/08/2016. CEA 15.7 on 11/08/2016. Cycle # 9 FOLFIRI/Avastin was on 11/29/2016. CEA 13.6 on 11/29/2016. Cycle # 10 FOLFIRI/Avastin was on 12/13/2016. CEA 10.6 on 12/13/2016. Cycle # 11 FOLFIRI/Avastin was on 12/27/2016. CEA 11.1 on 12/27/2016. Cycle # 12 FOLFIRI/Avastin was on 01/10/2017. CEA 9.7 on 01/10/2017. CT chest 01/23/2017 No new pulmonary nodule or lymphadenopathy. Patchy groundglass opacities within the left lower lobe, suggestive of infectious or inflammatory etiology. Followup CT chest in 3 months. Slight increased linear sclerosis along the left anterior sixth rib corresponding to an area of increased uptake on the prior bone scan from 10/21/2016, favored to be related to interval healing changes of a nondisplaced fracture.    CT abdomen/pelvis on 01/23/2017 Redemonstration of 06/27/2017. Re-staging scans 07/05/2017:  Bone scan stable proximal left femoral diaphysis, right acetabulum, and left anterior sixth rib lesions;  CT abdomen/pelvis stable hypodense metastatic lesions within the liver; CT chest without convincing evidence of metastatic disease. Cycle # 25 FOLFIRI + Avastin was on 07/11/2017. CEA 6.3 on 07/11/2017. Cycle # 26 FOLFIRI + Avastin was on 07/25/2017. CEA 7.3 on 07/25/2017. Cycle # 27 FOLFIRI + Avastin was on 08/08/2017. CEA 6.5 on 08/08/2017. Cycle # 28 FOLFIRI + Avastin was on 08/22/2017. CEA 5.9 on 08/22/2017. Cycle # 29 FOLFIRI + Avastin was on 09/05/2017. CEA 6.3 on 09/05/2017. Cycle # 30 FOLFIRI + Avastin was on 09/19/2017. CEA 6.3 on 09/19/2017. Bone scan 09/26/2017 stable. CT abdomen/pelvis on 09/26/2017 Stable hypodense mass in the liver. Stable sclerotic lesion in the acetabulum. CT chest 09/26/2017 negative for metastatic disease. Cycle # 31 FOLFIRI + Avastin was on 10/03/2017. CEA 7.4 on 10/03/2017. Cycle # 32 FOLFIRI + Avastin was on 10/17/2017. CEA 6.0 on 10/17/2017. Cycle # 33 FOLFIRI + Avastin was on 10/31/2017. CEA 6.8 on 10/31/2017. Cycle # 34 FOLFIRI + Avastin was on 11/14/2017. CEA 7.2 on 11/14/2017. Cycle # 35 FOLFIRI + Avastin was on 11/28/2017. CEA 5.1 on 11/28/2017. Cycle # 36 FOLFIRI + Avastin was on 12/12/2017. CEA 6.1 on 12/12/2017. Bone scan 12/22/2017 noted no evidence of metastatic disease to the axial or appendicular skeleton. CT chest 12/22/2017 noted no evidence of metastatic disease. CT abdomen/pelvis 12/22/2017 noted stable hypodense lesions in the liver. Continue FOLFIRI + Avastin and repeat scans in 3 months. Cycle # 37 FOLFIRI + Avastin was on 12/27/2018. CEA 6.7 on 12/27/2017. Cycle # 38 FOLFIRI + Avastin was on 01/09/2018. CEA 5.0 on 01/09/2018. Cycle # 39 FOLFIRI + Avastin was on 01/23/2018. CEA 6.5 on 01/23/2018. Cycle # 40 FOLFIRI + Avastin was on 02/06/2018. CEA 6.9 on 02/06/2018.   Cycle # 17 FOLFIRI + Avastin was on 02/20/2018. CEA 6.4 on 02/20/2018. Cycle # 42 FOLFIRI + Avastin was on 03/06/2018. CEA 6.6 on 03/06/2018. Cycle # 43 FOLFIRI + Avastin was on 03/20/2018. CEA 5.7 on 03/20/2018. Bone scan on 03/26/2018 negative for metastatic disease. CT chest 03/26/2018 noted ? new 7 mm nodule in LUCY. CT abdomen/pelvis 03/26/2018 noted stable hypodense lesions in the liver. ? New small ascites in the abdomen. Patient wants to continue to monitor the lung finding and repeat scans in 2 months instead of changing the current chemotherapy regimen to oral Lonsurf. Cycle # 44 FOLFIRI + Avastin was on 04/03/2018. CEA 6.3 on 04/03/2018. Cycle # 45 FOLFIRI + Avastin was on 04/24/2018. CEA 5.3 on 04/24/2018. Cycle # 46 FOLFIRI + Avastin was on 05/08/2018. CEA 5.4 on 05/08/2018. Cycle # 47 FOLFIRI + Avastin was on 05/22/2018. CEA 6.1 on 05/22/2018. CT chest 05/31/2018 noted stable nodule in LUCY. No evidence of worsening malignancy. CT abdomen/pelvis on 05/31/2018 noted stable liver metastasis. No evidence of worsening malignancy. Continue FOLFIRI + Avastin and repeat scans in 3 months. Cycle # 48 FOLFIRI + Avastin was on 06/06/2018. CEA 6.3 on 06/05/2018. Cycle # 49 FOLFIRI + Avastin was on 06/19/2018. CEA 6.1 on 06/19/2018. Cycle # 50 FOLFIRI + Avastin was on 07/03/2018. CEA 7.4 on 07/03/2018. Cycle # 51 FOLFIRI + Avastin was on 07/24/2018. CEA 6.7 on 07/24/2018. Cycle # 52 FOLFIRI + Avastin was on 08/14/2018. CEA 6.8 on 08/14/2018. Cycle # 53 FOLFIRI + Avastin was on 08/28/2018. CEA 6.5 on 08/27/2018. CT chest 09/06/2018 noted interval decrease in size of LUCY measuring up to 4 mm, suggestive of treatment response. No mediastinal or hilar LN  CT abdomen/pelvis 09/06/2018 Slight interval increase in size of a previously noted metastatic lesion within the right hepatic lobe.  This may be related to differences in phase of enhancement compared to the most recent study from 5/31/2018, the lesion remains decreased in size compared to the more previous studies. No abdominal, retroperitoneal, or pelvic lymphadenopathy. Continue FOLFIRI + Avastin and repeat scans in 2 months. Cycle # 54 FOLFIRI + Avastin was on 09/11/2018. CEA 6.4 on 09/10/2018. Cycle # 55 FOLFIRI + Avastin was on 09/25/2018. CEA 6.4 on 09/25/2018. Cycle # 56 FOLFIRI + Avastin was on 10/09/2018. CEA 6.7 on 10/08/2018. Cycle # 57 FOLFIRI + Avastin was on 10/23/2018. CEA 7.2 on 10/22/2018. CT chest 11/02/2018 stable 3 mm nodule within LUCY. No new right and left lung nodule. No mediastinal or osseous lesion. CT abdomen/pelvis 11/02/2018 stable metastatic disease to liver. No new metastatic disease identified. No mesenteric or retroperitoneal LN. No gross colonic lesion identified. Continue FOLFIRI + Avastin and repeat scans in 3 months. Cycle # 58 FOLFIRI + Avastin was on 11/06/2018. CEA 7.6 on 11/05/2018. Cycle # 59 FOLFIRI + Avastin was on 11/27/2018. CEA 6.9 on 11/26/2018. Cycle # 60 FOLFIRI + Avastin was on 12/11/2018. CEA 6.7 on 12/11/2018. Cycle # 61 FOLFIRI + Avastin was on 01/08/2019. CEA 5.6 on 01/07/2019. Cycle # 62 FOLFIRI + Avastin was on 01/29/2019. CEA 4.6 on 01/28/2019. Cycle # 63 FOLFIRI + Avastin was on 02/12/2019. CEA 4.3 on 02/11/2019. CT chest 02/21/2019 no evidence of metastatic disease. CT abdomen/pelvis 02/21/2019 stable hypodense liver lesions. No evidence of worsening metastatic disease. 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 Ordered MRI thoracic spine and referred to neurosurgery team.  Cycle # 64 FOLFIRI + Avastin was on 02/26/2019. CEA 4.7 on 02/25/2019. Cycle # 65 FOLFIRI + Avastin was on 03/12/2019. CEA 4.0 on 03/11/2019. Cycle # 66 FOLFIRI + Avastin was on 03/26/2019. CEA 4.7 on 03/25/2019. Cycle # 67 FOLFIRI + Avastin was on 04/09/2019. CEA 5.6 on 04/08/2019. Cycle # 68 FOLFIRI + Avastin was on 04/30/2019. CEA 4.9 on 04/29/2019.   Cycle # 71 FOLFIRI + Avastin was on 05/14/2019. CEA 5.3 on 05/14/2019. CT chest 05/23/2019 stable small lung nodule with no evidence of metastatic disease. CT abdomen/pelvis 05/23/2019 Decrease conspicuity of the liver lesions. No new lesions seen. Stable splenomegaly. Continue FOLFIRI + Avastin and repeat scans in 3 months. Cycle # 70 FOLFIRI + Avastin was on 06/04/2019. CEA 4.8 on 06/03/2019. Cycle # 71 FOLFIRI + Avastin was on 06/18/2019. CEA 4.6 on 06/17/2019. Cycle # 72 FOLFIRI + Avastin was on 07/09/2019. CEA 4.3 on 07/08/2019. Cycle # 73 FOLFIRI + Avastin was on 07/30/2019. CEA 5.0 on 07/29/2019. Cycle # 74 FOLFIRI + Avastin was on 08/13/2019. CEA 5.0 on 08/12/2019. CT chest 08/20/2019 negative  CT abdomen/pelvis 08/20/2019 Previous identified hepatic lesions are not visualized on the current exam.  Continue FOLFIRI + Avastin and repeat scans in 3 months. Cycle # 75 FOLFIRI + Avastin was on 08/27/2019. CEA 5.0 on 08/26/2019. Cycle # 76 FOLFIRI + Avastin was on 09/17/2019. CEA 5.5 on 09/16/2019. Cycle # 77 FOLFIRI + Avastin was on 10/15/2019. CEA 4.6 on 10/15/2019. Cycle # 78 FOLFIRI + Avastin was on 10/29/2019. CEA 4.1 on 10/28/2019. Cycle # 79 FOLFIRI + Avastin was on 11/12/2019. CEA 4.3 on 11/12/2019. Cycle # 80 FOLFIRI + Avastin was on 12/10/2019. CEA 4.0 on 12/09/2019. CT chest 12/19/2019 Stable 3 mm nodule within the left upper lobe, similar to the previous studies dating back to 11/2/2018, however decreased in size compared to the CT from 3/26/2018. No thoracic LN. CT abdomen/pelvis 12/19/2019 Previously described small hypodense lesions are more conspicuous on the current study compared to the recent study throughout the liver from 8/20/2019, however similar to the prior study from 2/21/2019. Findings may be related to differences in contrast opacification. No abdominal or pelvic lymphadenopathy. Continue FOLFIRI + Avastin and repeat scans in 2 months to f/u on liver lesions.   Cycle # 08 FOLFIRI + Avastin was on 01/07/2020. CEA 3.8 on 01/06/2020. Cycle # 82 FOLFIRI + Avastin was on 01/21/2020. CEA 3.7 on 01/20/2020. Cycle # 83 FOLFIRI + Avastin was on 02/04/2020. CEA 4.1 on 02/03/2020. Cycle # 84 FOLFIRI + Avastin was on 02/18/2020. CEA 4.6 on 02/17/2020. CT chest 2/28/2020 no evidence of metastatic disease to the lungs and no mediastinal or hilar adenopathy. CT abdomen pelvis 2/28/2020 no enhancing lesions seen within the liver to suggest metastatic disease. Wall thickening of the rectosigmoid junction. Images reviewed. Continue FOLFIRI Avastin and repeat scans in 3 months  EGD by Dr. Jenelle Lopez 03/02/2020 showing no masses or lesions. Cycle # 85 FOLFIRI + Avastin was on 03/03/2020. CEA 7.4 on 03/02/2020. Cycle # 86 FOLFIRI + Avastin was on 03/17/2020. CEA 4.5 on 03/16/2020. Colonoscopy on 03/23/2020 by Dr. Jenelle Lopez unremarkable (records requested). Cycle # 87 FOLFIRI + Avastin was on 03/31/2020. CEA 4.1 on 03/30/2020. Cycle # 88 FOLFIRI + Avastin was on 04/21/2020. CEA 6.4 on 04/20/2020. Cycle # 89 FOLFIRI + Avastin was on 05/05/2020. CEA 5.8 on 05/04/2020. Cycle # 90 FOLFIRI + Avastin was on 06/02/2020. CEA 5.5 on 06/01/2020. Cycle # 91 FOLFIRI + Avastin was on 06/16/2020. CEA 5.6 on 06/15/2020. CT chest 06/23/2020 noted no metastatic disease in the chest.   CT abdomen/pelvis 06/23/2020 Stable small liver lesions measuring up to 5 mm since 2019.   22 mm round mass in segment 8 of the liver with central high density stable from December 2019), previously measuring up to 34 mm in 2017. No additional metastatic disease in the abdomen or pelvis. Small amount of ascites. Overall stable disease. Continue FOLFIRI + Avastin and repeat scans in 2-3 months. Cycle # 92 FOLFIRI + Avastin was on 07/07/2020. CEA 5.7 on 07/06/2020. Cycle # 93 FOLFIRI + Avastin was on 07/21/2020. CEA 5.9 on 07/20/2020. Cycle # 94 FOLFIRI + Avastin was on 08/04/2020. CEA 5.5 on 08/04/2020.   Cycle # 95 FOLFIRI + Avastin was on 08/25/2020. CEA 6.1 on 08/24/2020. CT chest 9/2/2020 stable unremarkable enhanced CT of the thorax. There is no metastatic disease to the lungs. CT abdomen pelvis on 9/2/2020 stable 2.2 cm low attenuated lesion within the central aspect of the right hepatic lobe favored to represent treated metastatic disease. No new hepatic lesion is identified. Stable splenomegaly  There is no appreciable colonic lesion or stricture. Pericholecystic fluid of uncertain etiology. Laparoscopic cholecystectomy with normal cholangiogram 10/27/20  Gallbladder: Chronic cholecystitis and cholelithiasis.  Unremarkable regional lymph node. 11/13/2020; Seen by Dr. Carlos Delarosa from General surgery team; cleared to re-start chemotherapy. Cycle # 96 FOLFIRI + Avastin was on 11/17/2020. CEA 3.6 on 11/16/2020. Cycle # 97 FOLFIRI + Avastin was on 12/01/2020. CEA 3.5 on 11/30/2020. Cycle # 98 FOLFIRI + Avastin was on 12/15/2020. CEA 4.3 on 12/15/2020. Cycle # 99 FOLFIRI + Avastin was on 01/05/2021. CEA 4.7 on 01/04/2021. CT chest 01/13/2021 negative for metastatic disease. CT abdomen/pelvis 01/13/2021 Unchanged central hepatic lesion. No specific signs of abdominopelvic metastatic disease. Continue FOLFIRI + Avastin and repeat scans in 3 months. Cycle # 100 FOLFIRI + Avastin was on 01/19/2021. CEA 4.7 on 01/18/2021. Cycle # 101 FOLFIRI + Avastin was on 02/02/2021. CEA 5.2 on 02/01/2021. Cycle # 102 FOLFIRI + Avastin was on 02/16/2021. CEA 5.6 on 02/15/2021. Cycle # 103 FOLFIRI + Avastin was on 03/02/2021. Cycle # 104 FOLFIRI (20% dose reduced due to significant toxicity) + Avastin was on 03/16/2021. Cycle # 105 FOLFIRI (same dose as cycle # 104) + Avastin is today 03/30/2021. CEA 6.5 on 03/29/2021. RTC 2 weeks for Cycle # 106 FOLFIRI + Avastin.  Scans after next visit   MSI testing noted no mismatch repair protein loss of expression    3/30/2021  Aleksandar Norwood MD  Board Certified Medical Oncologist

## 2021-03-30 NOTE — PROGRESS NOTES
Patient presents to clinic today for Xgeva injection. Patient's labs monitored, specifically, Calcium level, to ensure no increased risk of hypocalcemia with administration of the medication. Patient's calcium level is 8.8 mg/dL. Patient received therapy and will continue to be monitored prior to each dose.     [facility]asH[facility] for Special Surgery 3/30/2021 9:36 AM

## 2021-04-01 ENCOUNTER — HOSPITAL ENCOUNTER (OUTPATIENT)
Dept: INFUSION THERAPY | Age: 71
Discharge: HOME | End: 2021-04-01
Payer: MEDICARE

## 2021-04-01 DIAGNOSIS — C20 RECTAL CANCER METASTASIZED TO LIVER (HCC): ICD-10-CM

## 2021-04-01 DIAGNOSIS — Z51.11 ENCOUNTER FOR ANTINEOPLASTIC CHEMOTHERAPY: Primary | ICD-10-CM

## 2021-04-01 DIAGNOSIS — C78.7 RECTAL CANCER METASTASIZED TO LIVER (HCC): ICD-10-CM

## 2021-04-01 PROCEDURE — 6360000002 HC RX W HCPCS: Performed by: INTERNAL MEDICINE

## 2021-04-01 PROCEDURE — 96372 THER/PROPH/DIAG INJ SC/IM: CPT

## 2021-04-01 RX ADMIN — PEGFILGRASTIM-CBQV 6 MG: 6 INJECTION, SOLUTION SUBCUTANEOUS at 14:51

## 2021-04-12 ENCOUNTER — HOSPITAL ENCOUNTER (OUTPATIENT)
Age: 71
Discharge: HOME | End: 2021-04-12
Payer: MEDICARE

## 2021-04-12 DIAGNOSIS — C20 RECTAL CANCER METASTASIZED TO LIVER (HCC): ICD-10-CM

## 2021-04-12 DIAGNOSIS — C79.51 BONE METASTASIS (HCC): ICD-10-CM

## 2021-04-12 DIAGNOSIS — C78.7 RECTAL CANCER METASTASIZED TO LIVER (HCC): ICD-10-CM

## 2021-04-12 LAB
ALBUMIN SERPL-MCNC: 3.6 G/DL (ref 3.5–5.2)
ALP BLD-CCNC: 188 U/L (ref 40–129)
ALT SERPL-CCNC: 17 U/L (ref 0–40)
ANION GAP SERPL CALCULATED.3IONS-SCNC: 12 MMOL/L (ref 7–16)
ANISOCYTOSIS: ABNORMAL
AST SERPL-CCNC: 25 U/L (ref 0–39)
BASOPHILS ABSOLUTE: 0.02 E9/L (ref 0–0.2)
BASOPHILS RELATIVE PERCENT: 1 % (ref 0–2)
BILIRUB SERPL-MCNC: 0.7 MG/DL (ref 0–1.2)
BUN BLDV-MCNC: 20 MG/DL (ref 8–23)
CALCIUM SERPL-MCNC: 9 MG/DL (ref 8.6–10.2)
CEA: 6 NG/ML (ref 0–5.2)
CHLORIDE BLD-SCNC: 105 MMOL/L (ref 98–107)
CO2: 24 MMOL/L (ref 22–29)
CREAT SERPL-MCNC: 1.4 MG/DL (ref 0.7–1.2)
EOSINOPHILS ABSOLUTE: 0.05 E9/L (ref 0.05–0.5)
EOSINOPHILS RELATIVE PERCENT: 2 % (ref 0–6)
GFR AFRICAN AMERICAN: >60
GFR NON-AFRICAN AMERICAN: 50 ML/MIN/1.73
GLUCOSE BLD-MCNC: 111 MG/DL (ref 74–99)
HCT VFR BLD CALC: 25.2 % (ref 37–54)
HEMOGLOBIN: 7.9 G/DL (ref 12.5–16.5)
LYMPHOCYTES ABSOLUTE: 0.38 E9/L (ref 1.5–4)
LYMPHOCYTES RELATIVE PERCENT: 16 % (ref 20–42)
MCH RBC QN AUTO: 31.2 PG (ref 26–35)
MCHC RBC AUTO-ENTMCNC: 31.3 % (ref 32–34.5)
MCV RBC AUTO: 99.6 FL (ref 80–99.9)
MONOCYTES ABSOLUTE: 0.05 E9/L (ref 0.1–0.95)
MONOCYTES RELATIVE PERCENT: 2 % (ref 2–12)
NEUTROPHILS ABSOLUTE: 1.9 E9/L (ref 1.8–7.3)
NEUTROPHILS RELATIVE PERCENT: 79 % (ref 43–80)
OVALOCYTES: ABNORMAL
PDW BLD-RTO: 20.1 FL (ref 11.5–15)
PLATELET # BLD: 110 E9/L (ref 130–450)
PMV BLD AUTO: 10.8 FL (ref 7–12)
POIKILOCYTES: ABNORMAL
POTASSIUM SERPL-SCNC: 3.8 MMOL/L (ref 3.5–5)
RBC # BLD: 2.53 E12/L (ref 3.8–5.8)
SODIUM BLD-SCNC: 141 MMOL/L (ref 132–146)
TOTAL PROTEIN: 6 G/DL (ref 6.4–8.3)
WBC # BLD: 2.4 E9/L (ref 4.5–11.5)

## 2021-04-12 PROCEDURE — 36415 COLL VENOUS BLD VENIPUNCTURE: CPT

## 2021-04-12 PROCEDURE — 85025 COMPLETE CBC W/AUTO DIFF WBC: CPT

## 2021-04-12 PROCEDURE — 82378 CARCINOEMBRYONIC ANTIGEN: CPT

## 2021-04-12 PROCEDURE — 80053 COMPREHEN METABOLIC PANEL: CPT

## 2021-04-13 ENCOUNTER — HOSPITAL ENCOUNTER (OUTPATIENT)
Dept: INFUSION THERAPY | Age: 71
Discharge: HOME | End: 2021-04-13
Payer: MEDICARE

## 2021-04-13 ENCOUNTER — OFFICE VISIT (OUTPATIENT)
Dept: ONCOLOGY | Age: 71
End: 2021-04-13
Payer: MEDICARE

## 2021-04-13 VITALS
DIASTOLIC BLOOD PRESSURE: 57 MMHG | HEART RATE: 60 BPM | WEIGHT: 176.1 LBS | OXYGEN SATURATION: 100 % | BODY MASS INDEX: 23.34 KG/M2 | RESPIRATION RATE: 18 BRPM | SYSTOLIC BLOOD PRESSURE: 111 MMHG | HEIGHT: 73 IN | TEMPERATURE: 97.8 F

## 2021-04-13 VITALS — SYSTOLIC BLOOD PRESSURE: 115 MMHG | DIASTOLIC BLOOD PRESSURE: 68 MMHG | RESPIRATION RATE: 16 BRPM | HEART RATE: 61 BPM

## 2021-04-13 DIAGNOSIS — C20 RECTAL CANCER METASTASIZED TO LIVER (HCC): Primary | ICD-10-CM

## 2021-04-13 DIAGNOSIS — C20 RECTAL CANCER (HCC): Primary | ICD-10-CM

## 2021-04-13 DIAGNOSIS — C78.7 RECTAL CANCER METASTASIZED TO LIVER (HCC): Primary | ICD-10-CM

## 2021-04-13 PROCEDURE — 6360000002 HC RX W HCPCS: Performed by: INTERNAL MEDICINE

## 2021-04-13 PROCEDURE — 96417 CHEMO IV INFUS EACH ADDL SEQ: CPT

## 2021-04-13 PROCEDURE — 96413 CHEMO IV INFUSION 1 HR: CPT

## 2021-04-13 PROCEDURE — 99214 OFFICE O/P EST MOD 30 MIN: CPT | Performed by: INTERNAL MEDICINE

## 2021-04-13 PROCEDURE — 96411 CHEMO IV PUSH ADDL DRUG: CPT

## 2021-04-13 PROCEDURE — 96368 THER/DIAG CONCURRENT INF: CPT

## 2021-04-13 PROCEDURE — 2580000003 HC RX 258: Performed by: INTERNAL MEDICINE

## 2021-04-13 PROCEDURE — 96375 TX/PRO/DX INJ NEW DRUG ADDON: CPT

## 2021-04-13 PROCEDURE — 96415 CHEMO IV INFUSION ADDL HR: CPT

## 2021-04-13 RX ORDER — DEXAMETHASONE SODIUM PHOSPHATE 10 MG/ML
10 INJECTION, SOLUTION INTRAMUSCULAR; INTRAVENOUS ONCE
Status: CANCELLED | OUTPATIENT
Start: 2021-04-13

## 2021-04-13 RX ORDER — DEXTROSE MONOHYDRATE 50 MG/ML
INJECTION, SOLUTION INTRAVENOUS
Status: DISPENSED
Start: 2021-04-13 | End: 2021-04-13

## 2021-04-13 RX ORDER — DIPHENHYDRAMINE HYDROCHLORIDE 50 MG/ML
50 INJECTION INTRAMUSCULAR; INTRAVENOUS ONCE
Status: CANCELLED | OUTPATIENT
Start: 2021-04-13 | End: 2021-04-13

## 2021-04-13 RX ORDER — HEPARIN SODIUM (PORCINE) LOCK FLUSH IV SOLN 100 UNIT/ML 100 UNIT/ML
500 SOLUTION INTRAVENOUS PRN
Status: DISCONTINUED | OUTPATIENT
Start: 2021-04-13 | End: 2021-04-14 | Stop reason: HOSPADM

## 2021-04-13 RX ORDER — SODIUM CHLORIDE 0.9 % (FLUSH) 0.9 %
10 SYRINGE (ML) INJECTION PRN
Status: CANCELLED | OUTPATIENT
Start: 2021-04-13

## 2021-04-13 RX ORDER — SODIUM CHLORIDE 9 MG/ML
INJECTION, SOLUTION INTRAVENOUS CONTINUOUS
Status: CANCELLED | OUTPATIENT
Start: 2021-04-13

## 2021-04-13 RX ORDER — 0.9 % SODIUM CHLORIDE 0.9 %
10 VIAL (ML) INJECTION ONCE
Status: CANCELLED | OUTPATIENT
Start: 2021-04-13 | End: 2021-04-13

## 2021-04-13 RX ORDER — PALONOSETRON HYDROCHLORIDE 0.05 MG/ML
0.25 INJECTION, SOLUTION INTRAVENOUS ONCE
Status: CANCELLED | OUTPATIENT
Start: 2021-04-13

## 2021-04-13 RX ORDER — ATROPINE SULFATE 0.4 MG/ML
0.4 AMPUL (ML) INJECTION ONCE
Status: CANCELLED | OUTPATIENT
Start: 2021-04-13 | End: 2021-04-13

## 2021-04-13 RX ORDER — ATROPINE SULFATE 0.4 MG/ML
0.4 AMPUL (ML) INJECTION ONCE
Status: COMPLETED | OUTPATIENT
Start: 2021-04-13 | End: 2021-04-13

## 2021-04-13 RX ORDER — FLUOROURACIL 50 MG/ML
650 INJECTION, SOLUTION INTRAVENOUS ONCE
Status: COMPLETED | OUTPATIENT
Start: 2021-04-13 | End: 2021-04-13

## 2021-04-13 RX ORDER — PALONOSETRON HYDROCHLORIDE 0.05 MG/ML
0.25 INJECTION, SOLUTION INTRAVENOUS ONCE
Status: COMPLETED | OUTPATIENT
Start: 2021-04-13 | End: 2021-04-13

## 2021-04-13 RX ORDER — DEXAMETHASONE SODIUM PHOSPHATE 10 MG/ML
10 INJECTION INTRAMUSCULAR; INTRAVENOUS ONCE
Status: COMPLETED | OUTPATIENT
Start: 2021-04-13 | End: 2021-04-13

## 2021-04-13 RX ORDER — HEPARIN SODIUM (PORCINE) LOCK FLUSH IV SOLN 100 UNIT/ML 100 UNIT/ML
500 SOLUTION INTRAVENOUS PRN
Status: CANCELLED | OUTPATIENT
Start: 2021-04-13

## 2021-04-13 RX ORDER — SODIUM CHLORIDE 9 MG/ML
250 INJECTION, SOLUTION INTRAVENOUS CONTINUOUS
Status: CANCELLED | OUTPATIENT
Start: 2021-04-13 | End: 2021-05-01

## 2021-04-13 RX ORDER — SODIUM CHLORIDE 9 MG/ML
250 INJECTION, SOLUTION INTRAVENOUS CONTINUOUS
Status: DISCONTINUED | OUTPATIENT
Start: 2021-04-13 | End: 2021-04-14 | Stop reason: HOSPADM

## 2021-04-13 RX ORDER — FLUOROURACIL 50 MG/ML
650 INJECTION, SOLUTION INTRAVENOUS ONCE
Status: CANCELLED | OUTPATIENT
Start: 2021-04-13

## 2021-04-13 RX ORDER — EPINEPHRINE 1 MG/ML
0.3 INJECTION, SOLUTION, CONCENTRATE INTRAVENOUS PRN
Status: CANCELLED | OUTPATIENT
Start: 2021-04-13

## 2021-04-13 RX ORDER — SODIUM CHLORIDE 0.9 % (FLUSH) 0.9 %
10 SYRINGE (ML) INJECTION PRN
Status: DISCONTINUED | OUTPATIENT
Start: 2021-04-13 | End: 2021-04-14 | Stop reason: HOSPADM

## 2021-04-13 RX ORDER — METHYLPREDNISOLONE SODIUM SUCCINATE 125 MG/2ML
125 INJECTION, POWDER, LYOPHILIZED, FOR SOLUTION INTRAMUSCULAR; INTRAVENOUS ONCE
Status: CANCELLED | OUTPATIENT
Start: 2021-04-13 | End: 2021-04-13

## 2021-04-13 RX ADMIN — ATROPINE SULFATE 0.4 MG: 0.4 INJECTION, SOLUTION INTRAMUSCULAR; INTRAVENOUS; SUBCUTANEOUS at 10:40

## 2021-04-13 RX ADMIN — SODIUM CHLORIDE 250 ML: 9 INJECTION, SOLUTION INTRAVENOUS at 09:12

## 2021-04-13 RX ADMIN — Medication 10 ML: at 12:23

## 2021-04-13 RX ADMIN — SODIUM CHLORIDE, PRESERVATIVE FREE 500 UNITS: 5 INJECTION INTRAVENOUS at 12:23

## 2021-04-13 RX ADMIN — PALONOSETRON 0.25 MG: 0.25 INJECTION, SOLUTION INTRAVENOUS at 09:20

## 2021-04-13 RX ADMIN — Medication 10 ML: at 09:12

## 2021-04-13 RX ADMIN — FLUOROURACIL 650 MG: 50 INJECTION, SOLUTION INTRAVENOUS at 12:34

## 2021-04-13 RX ADMIN — BEVACIZUMAB 400 MG: 400 INJECTION, SOLUTION INTRAVENOUS at 09:45

## 2021-04-13 RX ADMIN — SODIUM CHLORIDE 320 MG: 9 INJECTION, SOLUTION INTRAVENOUS at 10:41

## 2021-04-13 RX ADMIN — LEUCOVORIN CALCIUM 650 MG: 10 INJECTION INTRAMUSCULAR; INTRAVENOUS at 10:42

## 2021-04-13 RX ADMIN — DEXAMETHASONE SODIUM PHOSPHATE 10 MG: 10 INJECTION INTRAMUSCULAR; INTRAVENOUS at 09:20

## 2021-04-13 NOTE — PROGRESS NOTES
[place] [id number]  Attending Clinic Note     Reason for Visit: Follow-up on a patient with Metastatic Rectal Cancer.     PCP: Juliette Argueta MD     History of Present Illness:  70 y/o  male who was referred to see Dr. iLnh Parisi (GI team) for evaluation of bright red blood per rectum, mild anemia and change in bowel habits with diarrhea. CEA 2640 on 10/19/2015. AlcP 299 AST 57 ALT 75 on 10/19/2015. Colonoscopy in 2013 noted no significant polyps, colitis or lesions at that time. Denies any Family History of colorectal cancer or polyps.     Colonoscopy on 10/19/2015 revealed:  1. Ascending polyp, 8 mm, hot snare: Tubulovillous adenoma. 2. Transverse polyp, 1 cm, hot snare: Serrated polyp most consistent with sessile serrated adenoma. 3. Five splenic flexure polyps, three - 5 mm, 7 mm, 8 mm, hot snare and biopsy: Four segments of Tubular Adenoma  4. Descending polyp, 5 mm, biopsy: Tubular adenoma. 5. Sigmoid polyp, 4 mm biopsy, Serrated polyp most consistent with sessile serrated adenoma. 6. Two rectal polyps, 4 mm biopsy: Serrated polyp most consistent with hyperplastic polyp. 7. Rectosigmoid colon mass (large mass approximately 65% circumference of the lumen; Very friable, firm and hard): Tubulovillous adenoma with associated focal erosion and fibroplasia.      CT scan abdomen/pelvis on 10/26/2015:  1. Small nodules at lung bases likely represent metastatic colon   cancer. 2. Extensive likely metastatic colon cancer throughout the liver.      3. Mild mural thickening and luminal narrowing in the   terminal ileum, otherwise nonspecific.      Colonoscopy with snare removal rectal mass was performed by Dr. Lai Javed. Pathology proved:  Rectal polyp: Invasive adenocarcinoma involving villous adenoma and extending to the cauterized edge of excision. KRAS Mutation: Mutation detected.   BRAF Mutation: Mutation not detected. 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     CEA 3488. Bone scan on 11/10/2015 noted no metastatic disease. CT chest on 11/10/2015 revealed Multiple pulmonary nodules in the upper and lower lobes consistent with metastatic disease;   Hepatic metastasis also visualized. 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 Mediport was placed. Cycle # 1 of FOLFOX + Avastin was on 11/23/2015. CEA was 4555 on 11/23/2015. Cycle # 2 of FOLFOX + Avastin was on 12/08/2015. CEA was 3160 on 12/08/2015. Cycle # 3 of FOLFOX + Avastin was on 12/22/2015. CEA was 2516 on 12/21/2015. Cycle # 4 of FOLFOX + Avastin was on 01/05/2016. CEA was 2098 on 01/05/2015. Cycle # 5 of FOLFOX + Avastin was on 01/19/2016. CEA was 1511 on 01/05/2015.     -Bone scan on 01/26/2016 noted no metastatic disease. CT chest on 01/26/2016 revealed Significant response to treatment with no visible residual nodules. CT scan abdomen/pelvis on 01/26/2016 noted Interval decreased size of multiple masses in the liver compatible with treatment response. Continue another 2 months of FOLFOX + Avastin and repeat scans. Cycle # 6 of FOLFOX + Avastin was on 02/02/2016.  on 02/02/2016. Cycle # 7 of FOLFOX + Avastin was on 02/16/2016.  on 02/16/2016. Cycle # 8 of FOLFOX + Avastin was on 03/01/2016.  on 03/01/2016. Cycle # 9 of FOLFOX + Avastin was on 03/15/2016.  on 03/15/2016. Cycle # 10 of FOLFOX + Avastin was on 03/29/2016. .4 on 03/29/2016. Cycle # 11 of FOLFOX + Avastin was on 04/12/2016. .4 on 04/12/2016.     Re-staging scans on 04/19/2016: CT Chest: clear lungs; no evidence of recurring pulmonary nodule; CT Abdomen/Pelvis: Further interval decrease in size of the multiple metastatic hepatic lesions, the largest lesion now measures 3.9 x 3.5 cm and previously measured 4.5 cm in maximum diameter.  No mesenteric lymphadenopathy is identified; Bone Scan: No evidence of osseous metastasis. Continue same regimen and re-stage in 2-3 months. Cycle # 12 FOLFOX + Avastin was on 04/26/2016. .5 on 04/26/2016. Cycle # 13 FOLFOX + Avastin was on 05/10/2016. .3 on 05/10/2016. Cycle # 14 FOLFOX+AVASTIN was on 05/24/2016. .5 on 05/24/2016. Cycle # 15 FOLFOX + Avastin was on 06/07/2016. CEA 90.5 on 06/07/2016. Admitted to [facility] 06/13/2016-06/16/2016 for abdominal pain: EGD noted 1.5 cm clean based duodenal bulb ulceration s/p epinephrine and bicap per Dr. [facility]. No active bleeding. A. Stomach, biopsy: Mild chronic gastritis, immunostain negative for Helicobacter  B. Esophagus, biopsy: Gastric glandular mucosa with prominent intestinal metaplasia (Mad[place]'s epithelium), negative for epithelial dysplasia, esophageal squamous mucosa not identified  Cycle # 16 FOLFOX (discontinued avastin (bevacizumab) given association of peptic ulcer disease and known association of GI perforation) was on 06/21/2016. Cycle # 17 FOLFOX was on 07/05/2016. CEA 52.6 on 07/19/2016. Cycle # 17 FOLFOX was on 07/05/2016. CEA 52.6 on 07/05/2016. CEA 47.6 on 07/19/2016.     Re-staging scans 07/19/2106: CT Chest negative for metastatic disease. CT Abdomen/Pelvis: Stable hepatic lesions; Question new mural thickening in the cecum. Bone Scan: New Let hip lesion suspicious for bone metastasis.     Increased CEA; new mural thickening in the cecum and new left hip lesion suspicious for bone metastasis are consistent with disease progression; He derived maximum benefit from FOLFOX/AVASTIN. D/C FOLFOX/AVASTIN. We recommended FOLFIRI second line therapy. Cycle # 1 FOLFIRI was on 08/02/2016. CEA 40.8 on 08/02/2016. Xgeva q4 weeks started on 08/02/2016. Cycle # 2 FOLFIRI was on 08/16/2016. CEA 31.7 on 08/16/2016. Cycle # 3 FOLFIRI (added Avastin) was on 08/30/2016 given that ulcers healed on EGD 08/15/2016 by Dr. Екатерина Dc; Protonix bid since avastin re-started.    Colonoscopy on 08/29/2016 (to look at the cecal area) unremarkable. CEA 26.7 on 08/30/2016. Cycle # 4 FOLFIRI/Avastin was on 09/13/2016. CEA 23.4 on 09/13/2016. Cycle # 5 FOLFIRI/Avastin was on 09/27/2016. CEA 22.3 on 09/27/2016. Cycle # 6 FOLFIRI/Avastin was on 10/11/2016. CEA 18.4 on 10/11/2016.      Bone scan 10/21/2016 stable bone metastasis; ? New lesion anterior left sixth rib likely interval healing fracture. CT abdomen/pelvis revealed stable hepatic metastasis. CT chest negative for metastatic disease. Continue FOLFIRI/Avastin and repeat scans in 3 months. Cycle # 7 FOLFIRI/Avastin was on 10/25/2016. CEA 16.1  Cycle # 8 FOLFIRI/Avastin was on 11/08/2016. CEA 15.7  Cycle # 9 FOLFIRI/Avastin was on 11/29/2016. CEA 13.6 on 11/29/2016. Cycle # 10 FOLFIRI/Avastin was on 12/13/2016. CEA 10.6 on 12/13/2016. Cycle # 11 FOLFIRI/Avastin was on 12/27/2016. CEA 11.1 on 12/27/2016. Cycle # 12 FOLFIRI/Avastin was on 01/10/2017. CEA 9.7 on 01/10/2017.       CT chest 01/23/2017 No new pulmonary nodule or lymphadenopathy. Patchy groundglass opacities within the left lower lobe, suggestive of infectious or inflammatory etiology. Followup CT chest in 3 months. Slight increased linear sclerosis along the left anterior sixth rib corresponding to an area of increased uptake on the prior bone scan from 10/21/2016, favored to be related to interval healing changes of a nondisplaced fracture. CT abdomen/pelvis on 01/23/2017 Redemonstration of multiple hepatic metastases, which are similar compared to the prior CT from 10/21/2016. Redemonstration of area of increased sclerosis along the right acetabulum suspicious for metastatic disease. Bone scan on 01/23/2017 Stable subtle uptake within the left proximal diaphysis, again suggestive of metastatic disease  Decreased subtle uptake within the medial right acetabulum.    Decreased uptake within the left anterior sixth rib corresponding to linear sclerosis on the recent CT, favored to be related to an joint rib fracture. Continue FOLFIRI + Avastin and repeat scans in 3 months. Cycle # 13 FOLFIRI + Avastin was on 01/24/2017. Cycle # 14 FOLFIRI + Avastin was on 02/07/2017. Cycle # 15 FOLFIRI + Avastin was on 02/21/2017. Cycle # 16 FOLFIRI + Avastin was on 03/07/2017. Cycle # 17 FOLFIRI + Avastin was on 03/21/2017. Cycle # 18 FOLFIRI + Avastin was on 04/04/2017. CT chest 04/17/2017 negative for metastatic disease. CT abdomen/pelvis 04/17/2017 Stable hypodense metastatic lesions within the liver. Stable area of increased sclerosis along the right acetabulum  Bone scan 04/17/2017 Stable subtle uptake within the left proximal femoral diaphysis; No definite abnormal uptake in the region of a sclerotic lesion along the posterior column of the right acetabulum noted on CT. Stable slight uptake within the left anterior sixth rib corresponding to linear sclerosis on the recent CT, favored to be related to a fracture. Continue FOLFIRI + Avastin and repeat scans in 3 months. Cycle # 19 FOLFIRI + Avastin was on 04/18/2017. Cycle # 20 FOLFIRI + Avastin was on 05/02/2017. Cycle # 21 FOLFIRI + Avastin was on 05/16/2017. Cycle # 22 FOLFIRI + Avastin was on 05/30/2017. Cycle # 23 FOLFIRI + Avastin was on 06/13/2017. Cycle # 24 FOLFIRI + Avastin was on 06/27/2017. Cycle # 25 FOLFIRI + Avastin was on 07/11/2017. Cycle # 26 FOLFIRI + Avastin was on 07/25/2017. Cycle # 27 FOLFIRI + Avastin was on 08/08/2017. Cycle # 28 FOLFIRI + Avastin was on 08/22/2017. Cycle # 29 FOLFIRI + Avastin was on 09/05/2017. Cycle # 30 FOLFIRI + Avastin was on 09/19/2017. Bone scan 09/26/2017 stable. CT abdomen/pelvis on 09/26/2017 Stable hypodense mass in the liver. Stable sclerotic lesion in the acetabulum. CT chest 09/26/2017 negative for metastatic disease. Cycle # 31 FOLFIRI + Avastin was on 10/03/2017. CEA 7.4 on 10/03/2017. Cycle # 32 FOLFIRI + Avastin was on 10/17/2017.  CEA 6.0 on 10/17/2017. Cycle # 33 FOLFIRI + Avastin was on 10/31/2017. CEA 6.8 on 10/31/2017. Cycle # 34 FOLFIRI + Avastin was on 11/14/2017. CEA 7.2 on 11/14/2017. Cycle # 35 FOLFIRI + Avastin was on 11/28/2017. CEA 5.1 on 11/28/2017. Cycle # 36 FOLFIRI + Avastin was on 12/12/2017. CEA 6.1 on 12/12/2017. Bone scan 12/22/2017 noted no evidence of metastatic disease to the axial or appendicular skeleton. CT chest 12/22/2017 noted no evidence of metastatic disease. CT abdomen/pelvis 12/22/2017 noted stable hypodense lesions in the liver. Continue FOLFIRI + Avastin and repeat scans in 3 months. Cycle # 37 FOLFIRI + Avastin was on 12/27/2018. Cycle # 38 FOLFIRI + Avastin was on 01/09/2018. Cycle # 39 FOLFIRI + Avastin was on 01/23/2018. Cycle # 40 FOLFIRI + Avastin was on 02/06/2018. Cycle # 41 FOLFIRI + Avastin was on 02/20/2018. Cycle # 42 FOLFIRI + Avastin was on 03/06/2018. Cycle # 43 FOLFIRI + Avastin was on 03/20/2018. Bone scan on 03/26/2018 negative for metastatic disease. CT chest 03/26/2018 noted ? new 7 mm nodule in LUCY. CT abdomen/pelvis 03/26/2018 noted stable hypodense lesions in the liver. ? New small ascites in the abdomen. Patient wants to continue to monitor the lung finding and repeat scans in 2 months instead of changing the current regimen into oral Lonsurf. Cycle # 44 FOLFIRI + Avastin was on 04/03/2018. CEA 6.3 on 04/03/2018. Cycle # 45 FOLFIRI + Avastin was on 04/24/2018. CEA 5.3 on 04/24/2018. Cycle # 46 FOLFIRI + Avastin was on 05/08/2018. CEA 5.4 on 05/08/2018. Cycle # 47 FOLFIRI + Avastin was on 05/22/2018. CEA 6.1 on 05/22/2018. CT chest 05/31/2018 noted stable nodule in LUCY. No evidence of worsening malignancy. CT abdomen/pelvis on 05/31/2018 noted stable liver metastasis. No evidence of worsening malignancy. Continue FOLFIRI + Avastin and repeat scans in 3 months. Cycle # 48 FOLFIRI + Avastin was on 06/06/2018. Cycle # 49 FOLFIRI + Avastin was on 06/19/2018. Cycle # 50 FOLFIRI + Avastin was on 07/03/2018. Cycle # 51 FOLFIRI + Avastin was on 07/24/2018. Cycle # 52 FOLFIRI + Avastin was on 08/14/2018. Cycle # 53 FOLFIRI + Avastin was on 08/28/2018. CT chest 09/06/2018 noted interval decrease in size of LUCY measuring up to 4 mm, suggestive of treatment response. No mediastinal or hilar LN  CT abdomen/pelvis 09/06/2018 Slight interval increase in size of a previously noted metastatic lesion within the right hepatic lobe. This may be related to differences in phase of enhancement compared to the most recent study from 5/31/2018, the lesion remains decreased in size compared to the more previous studies. No abdominal, retroperitoneal, or pelvic lymphadenopathy. Continue FOLFIRI + Avastin and repeat scans in 2 months. Cycle # 54 FOLFIRI + Avastin was on 09/11/2018. Cycle # 55 FOLFIRI + Avastin was on 09/25/2018. Cycle # 56 FOLFIRI + Avastin was on 10/09/2018. Cycle # 57 FOLFIRI + Avastin was on 10/23/2018. CT chest 11/02/2018 stable 3 mm nodule within LUCY. No new right and left lung nodule. No mediastinal or osseous lesion. CT abdomen/pelvis 11/02/2018 stable metastatic disease to liver. No new metastatic disease identified. No mesenteric or retroperitoneal LN. No gross colonic lesion identified. Continue FOLFIRI + Avastin and repeat scans in 3 months. Cycle # 58 FOLFIRI + Avastin was on 11/06/2018. CEA 7.6 on 11/05/2018. Cycle # 59 FOLFIRI + Avastin was on 11/27/2018. CEA 6.9 on 11/26/2018. Cycle # 60 FOLFIRI + Avastin was on 12/11/2018. CEA 6.7 on 12/11/2018. Cycle # 61 FOLFIRI + Avastin was on 01/08/2019. CEA 5.6 on 01/07/2019. Cycle # 62 FOLFIRI + Avastin was on 01/29/2019. CEA 4.6 on 01/28/2019. Cycle # 63 FOLFIRI + Avastin was on 02/12/2019. CEA 4.3 on 02/11/2019. CT chest 02/21/2019 no evidence of metastatic disease. CT abdomen/pelvis 02/21/2019 stable hypodense liver lesions. No evidence of worsening metastatic disease.  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 Ordered MRI thoracic spine and referred to neurosurgery team.    Cycle # 64 FOLFIRI + Avastin was on 02/26/2019. CEA 4.7 on 02/25/2019. Cycle # 65 FOLFIRI + Avastin was on 03/12/2019. CEA 4.0 on 03/11/2019. Cycle # 66 FOLFIRI + Avastin was on 03/26/2019. CEA 4.7 on 03/25/2019. Cycle # 67 FOLFIRI + Avastin was on 04/09/2019. CEA 5.6 on 04/08/2019. Cycle # 68 FOLFIRI + Avastin was on 04/30/2019. CEA 4.9 on 04/29/2019. Cycle # 69 FOLFIRI + Avastin was on 05/14/2019. CEA 5.3 on 05/14/2019. CT chest 05/23/2019 stable small lung nodule with no evidence of metastatic disease. CT abdomen/pelvis 05/23/2019 Decrease conspicuity of the liver lesions. No new lesions seen. Stable splenomegaly. Continue FOLFIRI + Avastin and repeat scans in 3 months. Cycle # 70 FOLFIRI + Avastin was on 06/04/2019. CEA 4.8 on 06/03/2019. Cycle # 71 FOLFIRI + Avastin was on 06/18/2019. CEA 4.6 on 06/17/2019. Cycle # 72 FOLFIRI + Avastin was on 07/09/2019. CEA 4.3 on 07/08/2019. Cycle # 73 FOLFIRI + Avastin was on 07/30/2019. CEA 5.0 on 07/29/2019. Cycle # 74 FOLFIRI + Avastin was on 08/13/2019. CEA 5.0 on 08/12/2019. CT chest 08/20/2019 negative  CT abdomen/pelvis 08/20/2019 Previous identified hepatic lesions are not visualized on the current exam.  Continue FOLFIRI + Avastin and repeat scans in 3 months. Cycle # 75 FOLFIRI + Avastin was on 08/27/2019. CEA 5.0 on 08/26/2019. Cycle # 76 FOLFIRI + Avastin was on 09/17/2019. CEA 5.5 on 09/16/2019. Cycle # 77 FOLFIRI + Avastin was on 10/15/2019. CEA 4.6 on 10/15/2019. Cycle # 78 FOLFIRI + Avastin was on 10/29/2019. CEA 4.1 on 10/28/2019. Cycle # 79 FOLFIRI + Avastin was on 11/12/2019. CEA 4.3 on 11/12/2019. Cycle # 80 FOLFIRI + Avastin was on 12/10/2019. CEA 4.0 on 12/09/2019.   CT chest 12/19/2019 Stable 3 mm nodule within the left upper lobe, similar to the previous studies dating back to 11/2/2018, however decreased in size compared to the CT from 3/26/2018. No thoracic LN. CT abdomen/pelvis 12/19/2019 Previously described small hypodense lesions are more conspicuous on the current study compared to the recent study throughout the liver from 8/20/2019, however similar to the prior study from 2/21/2019. Findings may be related to differences in contrast opacification. No abdominal or pelvic lymphadenopathy. Continue FOLFIRI + Avastin and repeat scans in 2 months to f/u on liver lesions. Cycle # 81 FOLFIRI + Avastin was on 01/07/2020. CEA 3.8 on 01/06/2020. Cycle # 82 FOLFIRI + Avastin was on 01/21/2020. CEA 3.7 on 01/20/2020. Cycle # 83 FOLFIRI + Avastin was on 02/04/2020. CEA 4.1 on 02/03/2020. Cycle # 84 FOLFIRI + Avastin was on 02/18/2020. CEA 4.6 on 02/17/2020. EGD by Dr. Reinier Myers 03/02/2020 showing no masses or lesions  Cycle # 85 FOLFIRI + Avastin was on 03/03/2020. CEA 7.4 on 03/02/2020. Cycle # 86 FOLFIRI + Avastin was on 03/17/2020. CEA 4.5 on 03/16/2020. Colonoscopy on 03/23/2020 by Dr. Reinier aguero (records requested). Cycle # 87 FOLFIRI + Avastin was on 03/31/2020. CEA 4.1 on 03/30/2020. Cycle # 88 FOLFIRI + Avastin was on 04/21/2020. CEA 6.4 on 04/20/2020. Cycle # 89 FOLFIRI + Avastin was on 05/05/2020. CEA 5.8 on 05/04/2020. Cycle # 90 FOLFIRI + Avastin was on 06/02/2020. CEA 5.5 on 06/01/2020. Cycle # 91 FOLFIRI + Avastin was on 06/16/2020. CEA 5.6 on 06/15/2020. CT chest 06/23/2020 noted no metastatic disease in the chest.   CT abdomen/pelvis 06/23/2020 Stable small liver lesions measuring up to 5 mm since 2019.   22 mm round mass in segment 8 of the liver with central high density stable from December 2019), previously measuring up to 34 mm in 2017. No additional metastatic disease in the abdomen or pelvis. Small amount of ascites. Overall stable disease. Continue FOLFIRI + Avastin and repeat scans in 2-3 months. Cycle # 92 FOLFIRI + Avastin was on 07/07/2020.  CEA 03/29/2021. Cycle # 106 FOLFIRI (same dose as cycle # 104) + Avastin is today 04/13/2021. CEA 6.0 on 04/12/2021  Today 04/13/2021. No fever, chills. improved fatigue. Felt better with chemo dose reduction. Review of Systems;  CONSTITUTIONAL: No fever, chills. Fair appetite. improved fatigue. ENMT: Eyes: No diplopia; Nose: No epistaxis. Mouth:No lesions  RESPIRATORY: No hemoptysis, shortness of breath. CARDIOVASCULAR: No chest pain, palpitations. GASTROINTESTINAL:No N/V   GENITOURINARY: No dysuria, urinary frequency, hematuria. NEURO: No syncope, presyncope, headache. Remainder ROS: Negative. Past Medical History:   Past Medical History               Diagnosis Date    Arthritis      Cancer ([facility] [address]         colon    Depression      Hyperlipidemia      Hypertension           Medications:  Reviewed and reconciled.     Allergies: Allergies   Allergen Reactions    Neosporin [Neomycin-Polymyxin-Gramicidin] Rash    Tape Tonnie Kaitlin Tape] Rash      Physical Exam:  BP (!) 111/57   Pulse 60   Temp 97.8 °F (36.6 °C)   Resp 18   Ht 6' 1\" (1.854 m)   Wt 176 lb 1.6 oz (79.9 kg)   SpO2 100%   BMI 23.23 kg/m²   GENERAL: Alert, oriented x 3, not in distress  HEENT: PERRLA, EOMI. NECK: Supple. Without lymphadenopathy. LUNGS: Lungs are CTA bilaterally, with no wheezing, crackles or rhonchi. CARDIOVASCULAR: Regular rhythm. No murmurs, rubs or gallops. ABDOMEN: Soft. Non-tender, non-distended. Positive bowel sounds. EXTREMITIES: Without clubbing, cyanosis or edema. Skin toxicity in palms  NEUROLOGIC: No focal deficits.    ECOG PS 1    Diagnostics:  Lab Results   Component Value Date    WBC 2.4 (L) 04/12/2021    HGB 7.9 (L) 04/12/2021    HCT 25.2 (L) 04/12/2021    MCV 99.6 04/12/2021     (L) 04/12/2021     Lab Results   Component Value Date     04/12/2021    K 3.8 04/12/2021     04/12/2021    CO2 24 04/12/2021    BUN 20 04/12/2021    CREATININE 1.4 (H) 04/12/2021    GLUCOSE 111 (H) 04/12/2021    CALCIUM 9.0 04/12/2021    PROT 6.0 (L) 04/12/2021    LABALBU 3.6 04/12/2021    BILITOT 0.7 04/12/2021    ALKPHOS 188 (H) 04/12/2021    AST 25 04/12/2021    ALT 17 04/12/2021    LABGLOM 50 04/12/2021    GFRAA >60 04/12/2021     Lab Results   Component Value Date    CEA 6.0 (H) 04/12/2021     Impression/Plan:  70 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 KRAS Mutation: Mutation detected. BRAF Mutation: Mutation not detected. 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 CT scan abdomen/pelvis on 10/26/2015 revealed small nodules at the lung bases, extensive liver lesions consistent with metastatic rectosigmoid cancer. CEA 3488 on 10/30/2015. Bone scan on 11/10/2015 noted no metastatic disease. CT chest on 11/10/2015 revealed Multiple pulmonary nodules in the upper and lower lobes consistent with metastatic disease; Hepatic metastasis also visualized. 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 Mediport was placed. Cycle # 1 of FOLFOX + Avastin was on 11/23/2015. CEA was 4555 on 11/23/2015. Cycle # 2 of FOLFOX + Avastin was on 12/08/2015. CEA was 3160 on 12/08/2015. Cycle # 3 of FOLFOX + Avastin was on 12/22/2015. CEA was 2516 on 12/21/2015. Cycle # 4 of FOLFOX + Avastin was on 01/05/2016. CEA was 2098 on 01/05/2015. Cycle # 5 of FOLFOX + Avastin was on 01/19/2016. CEA was 1511 on 01/05/2015.     -Bone scan on 01/26/2016 noted no metastatic disease. CT chest on 01/26/2016 revealed Significant response to treatment with no visible residual nodules. CT scan abdomen/pelvis on 01/26/2016 noted Interval decreased size of multiple masses in the liver compatible with treatment response. Continue another 2 months of FOLFOX + Avastin and repeat scans. Cycle # 6 of FOLFOX + Avastin was on 02/02/2016.  on 02/02/2016. Cycle # 7 of FOLFOX + Avastin was on 02/16/2016.  on 02/16/2016.   Cycle # 8 of FOLFOX + Avastin was on 03/01/2016.  on 03/01/2016. Cycle # 9 of FOLFOX + Avastin was on 03/15/2016.  on 03/15/2016. Cycle # 10 of FOLFOX + Avastin was on 03/29/2016. .4 on 03/29/2016. Cycle # 11 of FOLFOX + Avastin was on 04/12/2016. .4 on 04/12/2016.     Re-staging scans on 04/19/2016: CT Chest: clear lungs; no evidence of recurring pulmonary nodule; CT Abdomen/Pelvis: Further interval decrease in size of the multiple metastatic hepatic lesions, the largest lesion now measures 3.9 x 3.5 cm and previously measured 4.5 cm in maximum diameter. No mesenteric lymphadenopathy is identified; Bone Scan: No evidence of osseous metastasis. Continue same regimen and re-stage in 2-3 months. Cycle # 12 FOLFOX + Avastin was on 04/26/2016. .5 on 04/26/2016. Cycle # 13 FOLFOX + Avastin was on 05/10/2016. .3 on 05/10/2016. Cycle # 14 FOLFOX+AVASTIN was on 05/24/2016. .5 on 05/24/2016. Cycle # 15 FOLFOX + Avastin was on 06/07/2016. CEA 90.5 on 06/07/2016. Admitted to [facility] 06/13/2016-06/16/2016 for abdominal pain: EGD noted 1.5 cm clean based duodenal bulb ulceration s/p epinephrine and bicap per Dr. Rossana Eng. No active bleeding. A. Stomach, biopsy: Mild chronic gastritis, immunostain negative for Helicobacter  B. Esophagus, biopsy: Gastric glandular mucosa with prominent ntestinal metaplasia (Mad[place]'s epithelium), negative for epithelial dysplasia, esophageal squamous mucosa not identified     Cycle # 16 FOLFOX (discontinued avastin (bevacizumab) given association of peptic ulcer disease and known association of GI perforation.) was on 06/21/2016. CEA 47 on 06/21/2016. Cycle # 17 FOLFOX was on 07/05/2016. CEA 52.6 on 07/05/2016. CEA 47.6 on 07/19/2016.     Re-staging scans 07/19/2106: CT Chest negative for metastatic disease. CT Abdomen/Pelvis: Stable hepatic lesions; Question new mural thickening in the cecum. Bone Scan: New Let hip lesion suspicious for bone metastasis.     Increased CEA; new mural thickening in the cecum and new left hip lesion suspicious for bone metastasis are consistent with disease progression; He derived maximum benefit from FOLFOX/AVASTIN. D/C FOLFOX/AVASTIN. We recommended FOLFIRI second line therapy. Cycle # 1 FOLFIRI was on 08/02/2016. CEA 40.8 on 08/02/2016. Xgeva q4 weeks started on 08/02/2016. Cycle # 2 FOLFIRI was on 08/16/2016. CEA 31.7 on 08/16/2016. Cycle # 3 FOLFIRI (added Avastin) was on 08/30/2016 given that ulcers healed on EGD 08/15/2016 by Dr. Steve Bañuelos; Protonix bid since avastin re-started. Colonoscopy on 08/29/2016 (to look at the cecal area) unremarkable. CEA 26.7 on 08/30/2016. Cycle # 4 FOLFIRI/Avastin was on 09/13/2016. CEA 23.4 on 09/13/2016. Cycle # 5 FOLFIRI/Avastin was on 09/27/2016. CEA 22.3 on 09/27/2016. Cycle # 6 FOLFIRI/Avastin was on 10/11/2016. CEA 18.4 on 10/11/2016.      Bone scan 10/21/2016 stable bone metastasis; ? New lesion anterior left sixth rib likely interval healing fracture. CT abdomen/pelvis revealed stable hepatic metastasis. CT chest negative for metastatic disease. Continue FOLFIRI/Avastin and repeat scans in 3 months. Cycle # 7 FOLFIRI/Avastin was on 10/25/2016. CEA 16.1 on 10/25/2016. Cycle # 8 FOLFIRI/Avastin was on 11/08/2016. CEA 15.7 on 11/08/2016. Cycle # 9 FOLFIRI/Avastin was on 11/29/2016. CEA 13.6 on 11/29/2016. Cycle # 10 FOLFIRI/Avastin was on 12/13/2016. CEA 10.6 on 12/13/2016. Cycle # 11 FOLFIRI/Avastin was on 12/27/2016. CEA 11.1 on 12/27/2016. Cycle # 12 FOLFIRI/Avastin was on 01/10/2017. CEA 9.7 on 01/10/2017. CT chest 01/23/2017 No new pulmonary nodule or lymphadenopathy. Patchy groundglass opacities within the left lower lobe, suggestive of infectious or inflammatory etiology. Followup CT chest in 3 months.  Slight increased linear sclerosis along the left anterior sixth rib corresponding to an area of increased uptake on the prior bone scan from 10/21/2016, favored to be related to interval healing changes of a nondisplaced fracture. CT abdomen/pelvis on 01/23/2017 Redemonstration of multiple hepatic metastases, which are similar compared to the prior CT from 10/21/2016. Redemonstration of area of increased sclerosis along the right acetabulum suspicious for metastatic disease. Bone scan on 01/23/2017 Stable subtle uptake within the left proximal diaphysis, again suggestive of metastatic disease  Decreased subtle uptake within the medial right acetabulum. Decreased uptake within the left anterior sixth rib corresponding to linear sclerosis on the recent CT, favored to be related to an joint rib fracture. Continue FOLFIRI + Avastin and repeat scans in 3 months. Cycle # 13 FOLFIRI + Avastin was on 01/24/2017. Cycle # 14 FOLFIRI + Avastin was on 02/07/2017. Cycle # 15 FOLFIRI + Avastin was on 02/21/2017. Cycle # 16 FOLFIRI + Avastin was on 03/07/2017. Cycle # 17 FOLFIRI + Avastin was on 03/21/2017. Cycle # 18 FOLFIRI + Avastin was on 04/04/2017. CT chest 04/17/2017 negative for metastatic disease. CT abdomen/pelvis 04/17/2017 Stable hypodense metastatic lesions within the liver. Stable area of increased sclerosis along the right acetabulum  Bone scan 04/17/2017 Stable subtle uptake within the left proximal femoral diaphysis; No definite abnormal uptake in the region of a sclerotic lesion along the posterior column of the right acetabulum noted on CT. Stable slight uptake within the left anterior sixth rib corresponding to linear sclerosis on the recent CT, favored to be related to a fracture. Continue FOLFIRI + Avastin and repeat scans in 3 months. Cycle # 19 FOLFIRI + Avastin was on 04/18/2017. CEA 7.5 on 04/18/2017. Cycle # 20 FOLFIRI + Avastin was on 05/02/2017. CEA 7.7 on 05/02/2017. Cycle # 21 FOLFIRI + Avastin was on 05/16/2017. CEA 7.1 on 05/16/2017. Cycle # 22 FOLFIRI + Avastin was on 05/30/2017. CEA 8.2 on 05/30/2017.   Cycle # 23 FOLFIRI + Avastin was on 06/13/2017. CEA 7.7 on 06/13/2017. Cycle # 24 FOLFIRI + Avastin was on 06/27/2017. CEA 6.6 on 06/27/2017. Re-staging scans 07/05/2017:  Bone scan stable proximal left femoral diaphysis, right acetabulum, and left anterior sixth rib lesions;  CT abdomen/pelvis stable hypodense metastatic lesions within the liver; CT chest without convincing evidence of metastatic disease. Cycle # 25 FOLFIRI + Avastin was on 07/11/2017. CEA 6.3 on 07/11/2017. Cycle # 26 FOLFIRI + Avastin was on 07/25/2017. CEA 7.3 on 07/25/2017. Cycle # 27 FOLFIRI + Avastin was on 08/08/2017. CEA 6.5 on 08/08/2017. Cycle # 28 FOLFIRI + Avastin was on 08/22/2017. CEA 5.9 on 08/22/2017. Cycle # 29 FOLFIRI + Avastin was on 09/05/2017. CEA 6.3 on 09/05/2017. Cycle # 30 FOLFIRI + Avastin was on 09/19/2017. CEA 6.3 on 09/19/2017. Bone scan 09/26/2017 stable. CT abdomen/pelvis on 09/26/2017 Stable hypodense mass in the liver. Stable sclerotic lesion in the acetabulum. CT chest 09/26/2017 negative for metastatic disease. Cycle # 31 FOLFIRI + Avastin was on 10/03/2017. CEA 7.4 on 10/03/2017. Cycle # 32 FOLFIRI + Avastin was on 10/17/2017. CEA 6.0 on 10/17/2017. Cycle # 33 FOLFIRI + Avastin was on 10/31/2017. CEA 6.8 on 10/31/2017. Cycle # 34 FOLFIRI + Avastin was on 11/14/2017. CEA 7.2 on 11/14/2017. Cycle # 35 FOLFIRI + Avastin was on 11/28/2017. CEA 5.1 on 11/28/2017. Cycle # 36 FOLFIRI + Avastin was on 12/12/2017. CEA 6.1 on 12/12/2017. Bone scan 12/22/2017 noted no evidence of metastatic disease to the axial or appendicular skeleton. CT chest 12/22/2017 noted no evidence of metastatic disease. CT abdomen/pelvis 12/22/2017 noted stable hypodense lesions in the liver. Continue FOLFIRI + Avastin and repeat scans in 3 months. Cycle # 37 FOLFIRI + Avastin was on 12/27/2018. CEA 6.7 on 12/27/2017. Cycle # 38 FOLFIRI + Avastin was on 01/09/2018. CEA 5.0 on 01/09/2018.   Cycle # 39 FOLFIRI + Avastin was on 01/23/2018. CEA 6.5 on 01/23/2018. Cycle # 40 FOLFIRI + Avastin was on 02/06/2018. CEA 6.9 on 02/06/2018. Cycle # 41 FOLFIRI + Avastin was on 02/20/2018. CEA 6.4 on 02/20/2018. Cycle # 42 FOLFIRI + Avastin was on 03/06/2018. CEA 6.6 on 03/06/2018. Cycle # 43 FOLFIRI + Avastin was on 03/20/2018. CEA 5.7 on 03/20/2018. Bone scan on 03/26/2018 negative for metastatic disease. CT chest 03/26/2018 noted ? new 7 mm nodule in LUCY. CT abdomen/pelvis 03/26/2018 noted stable hypodense lesions in the liver. ? New small ascites in the abdomen. Patient wants to continue to monitor the lung finding and repeat scans in 2 months instead of changing the current chemotherapy regimen to oral Lonsurf. Cycle # 44 FOLFIRI + Avastin was on 04/03/2018. CEA 6.3 on 04/03/2018. Cycle # 45 FOLFIRI + Avastin was on 04/24/2018. CEA 5.3 on 04/24/2018. Cycle # 46 FOLFIRI + Avastin was on 05/08/2018. CEA 5.4 on 05/08/2018. Cycle # 47 FOLFIRI + Avastin was on 05/22/2018. CEA 6.1 on 05/22/2018. CT chest 05/31/2018 noted stable nodule in LUCY. No evidence of worsening malignancy. CT abdomen/pelvis on 05/31/2018 noted stable liver metastasis. No evidence of worsening malignancy. Continue FOLFIRI + Avastin and repeat scans in 3 months. Cycle # 48 FOLFIRI + Avastin was on 06/06/2018. CEA 6.3 on 06/05/2018. Cycle # 49 FOLFIRI + Avastin was on 06/19/2018. CEA 6.1 on 06/19/2018. Cycle # 50 FOLFIRI + Avastin was on 07/03/2018. CEA 7.4 on 07/03/2018. Cycle # 51 FOLFIRI + Avastin was on 07/24/2018. CEA 6.7 on 07/24/2018. Cycle # 52 FOLFIRI + Avastin was on 08/14/2018. CEA 6.8 on 08/14/2018. Cycle # 53 FOLFIRI + Avastin was on 08/28/2018. CEA 6.5 on 08/27/2018. CT chest 09/06/2018 noted interval decrease in size of LUCY measuring up to 4 mm, suggestive of treatment response.  No mediastinal or hilar LN  CT abdomen/pelvis 09/06/2018 Slight interval increase in size of a previously noted metastatic lesion within the right hepatic lobe. This may be related to differences in phase of enhancement compared to the most recent study from 5/31/2018, the lesion remains decreased in size compared to the more previous studies. No abdominal, retroperitoneal, or pelvic lymphadenopathy. Continue FOLFIRI + Avastin and repeat scans in 2 months. Cycle # 54 FOLFIRI + Avastin was on 09/11/2018. CEA 6.4 on 09/10/2018. Cycle # 55 FOLFIRI + Avastin was on 09/25/2018. CEA 6.4 on 09/25/2018. Cycle # 56 FOLFIRI + Avastin was on 10/09/2018. CEA 6.7 on 10/08/2018. Cycle # 57 FOLFIRI + Avastin was on 10/23/2018. CEA 7.2 on 10/22/2018. CT chest 11/02/2018 stable 3 mm nodule within LUCY. No new right and left lung nodule. No mediastinal or osseous lesion. CT abdomen/pelvis 11/02/2018 stable metastatic disease to liver. No new metastatic disease identified. No mesenteric or retroperitoneal LN. No gross colonic lesion identified. Continue FOLFIRI + Avastin and repeat scans in 3 months. Cycle # 58 FOLFIRI + Avastin was on 11/06/2018. CEA 7.6 on 11/05/2018. Cycle # 59 FOLFIRI + Avastin was on 11/27/2018. CEA 6.9 on 11/26/2018. Cycle # 60 FOLFIRI + Avastin was on 12/11/2018. CEA 6.7 on 12/11/2018. Cycle # 61 FOLFIRI + Avastin was on 01/08/2019. CEA 5.6 on 01/07/2019. Cycle # 62 FOLFIRI + Avastin was on 01/29/2019. CEA 4.6 on 01/28/2019. Cycle # 63 FOLFIRI + Avastin was on 02/12/2019. CEA 4.3 on 02/11/2019. CT chest 02/21/2019 no evidence of metastatic disease. CT abdomen/pelvis 02/21/2019 stable hypodense liver lesions. No evidence of worsening metastatic disease. 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 Ordered MRI thoracic spine and referred to neurosurgery team.  Cycle # 64 FOLFIRI + Avastin was on 02/26/2019. CEA 4.7 on 02/25/2019. Cycle # 65 FOLFIRI + Avastin was on 03/12/2019. CEA 4.0 on 03/11/2019. Cycle # 66 FOLFIRI + Avastin was on 03/26/2019. CEA 4.7 on 03/25/2019.   Cycle # 67 FOLFIRI + Avastin was on 04/09/2019. CEA 5.6 on 04/08/2019. Cycle # 68 FOLFIRI + Avastin was on 04/30/2019. CEA 4.9 on 04/29/2019. Cycle # 69 FOLFIRI + Avastin was on 05/14/2019. CEA 5.3 on 05/14/2019. CT chest 05/23/2019 stable small lung nodule with no evidence of metastatic disease. CT abdomen/pelvis 05/23/2019 Decrease conspicuity of the liver lesions. No new lesions seen. Stable splenomegaly. Continue FOLFIRI + Avastin and repeat scans in 3 months. Cycle # 70 FOLFIRI + Avastin was on 06/04/2019. CEA 4.8 on 06/03/2019. Cycle # 71 FOLFIRI + Avastin was on 06/18/2019. CEA 4.6 on 06/17/2019. Cycle # 72 FOLFIRI + Avastin was on 07/09/2019. CEA 4.3 on 07/08/2019. Cycle # 73 FOLFIRI + Avastin was on 07/30/2019. CEA 5.0 on 07/29/2019. Cycle # 74 FOLFIRI + Avastin was on 08/13/2019. CEA 5.0 on 08/12/2019. CT chest 08/20/2019 negative  CT abdomen/pelvis 08/20/2019 Previous identified hepatic lesions are not visualized on the current exam.  Continue FOLFIRI + Avastin and repeat scans in 3 months. Cycle # 75 FOLFIRI + Avastin was on 08/27/2019. CEA 5.0 on 08/26/2019. Cycle # 76 FOLFIRI + Avastin was on 09/17/2019. CEA 5.5 on 09/16/2019. Cycle # 77 FOLFIRI + Avastin was on 10/15/2019. CEA 4.6 on 10/15/2019. Cycle # 78 FOLFIRI + Avastin was on 10/29/2019. CEA 4.1 on 10/28/2019. Cycle # 79 FOLFIRI + Avastin was on 11/12/2019. CEA 4.3 on 11/12/2019. Cycle # 80 FOLFIRI + Avastin was on 12/10/2019. CEA 4.0 on 12/09/2019. CT chest 12/19/2019 Stable 3 mm nodule within the left upper lobe, similar to the previous studies dating back to 11/2/2018, however decreased in size compared to the CT from 3/26/2018. No thoracic LN. CT abdomen/pelvis 12/19/2019 Previously described small hypodense lesions are more conspicuous on the current study compared to the recent study throughout the liver from 8/20/2019, however similar to the prior study from 2/21/2019. Findings may be related to differences in contrast opacification.   No abdominal or pelvic lymphadenopathy. Continue FOLFIRI + Avastin and repeat scans in 2 months to f/u on liver lesions. Cycle # 81 FOLFIRI + Avastin was on 01/07/2020. CEA 3.8 on 01/06/2020. Cycle # 82 FOLFIRI + Avastin was on 01/21/2020. CEA 3.7 on 01/20/2020. Cycle # 83 FOLFIRI + Avastin was on 02/04/2020. CEA 4.1 on 02/03/2020. Cycle # 84 FOLFIRI + Avastin was on 02/18/2020. CEA 4.6 on 02/17/2020. CT chest 2/28/2020 no evidence of metastatic disease to the lungs and no mediastinal or hilar adenopathy. CT abdomen pelvis 2/28/2020 no enhancing lesions seen within the liver to suggest metastatic disease. Wall thickening of the rectosigmoid junction. Images reviewed. Continue FOLFIRI Avastin and repeat scans in 3 months  EGD by Dr. Tracey Sabillon 03/02/2020 showing no masses or lesions. Cycle # 85 FOLFIRI + Avastin was on 03/03/2020. CEA 7.4 on 03/02/2020. Cycle # 86 FOLFIRI + Avastin was on 03/17/2020. CEA 4.5 on 03/16/2020. Colonoscopy on 03/23/2020 by Dr. Tracey Sabillon unremarkable (records requested). Cycle # 87 FOLFIRI + Avastin was on 03/31/2020. CEA 4.1 on 03/30/2020. Cycle # 88 FOLFIRI + Avastin was on 04/21/2020. CEA 6.4 on 04/20/2020. Cycle # 89 FOLFIRI + Avastin was on 05/05/2020. CEA 5.8 on 05/04/2020. Cycle # 90 FOLFIRI + Avastin was on 06/02/2020. CEA 5.5 on 06/01/2020. Cycle # 91 FOLFIRI + Avastin was on 06/16/2020. CEA 5.6 on 06/15/2020. CT chest 06/23/2020 noted no metastatic disease in the chest.   CT abdomen/pelvis 06/23/2020 Stable small liver lesions measuring up to 5 mm since 2019.   22 mm round mass in segment 8 of the liver with central high density stable from December 2019), previously measuring up to 34 mm in 2017. No additional metastatic disease in the abdomen or pelvis. Small amount of ascites. Overall stable disease. Continue FOLFIRI + Avastin and repeat scans in 2-3 months. Cycle # 92 FOLFIRI + Avastin was on 07/07/2020. CEA 5.7 on 07/06/2020.    Cycle # 93 FOLFIRI + Avastin was on 07/21/2020. CEA 5.9 on 07/20/2020. Cycle # 94 FOLFIRI + Avastin was on 08/04/2020. CEA 5.5 on 08/04/2020. Cycle # 95 FOLFIRI + Avastin was on 08/25/2020. CEA 6.1 on 08/24/2020. CT chest 9/2/2020 stable unremarkable enhanced CT of the thorax. There is no metastatic disease to the lungs. CT abdomen pelvis on 9/2/2020 stable 2.2 cm low attenuated lesion within the central aspect of the right hepatic lobe favored to represent treated metastatic disease. No new hepatic lesion is identified. Stable splenomegaly  There is no appreciable colonic lesion or stricture. Pericholecystic fluid of uncertain etiology. Laparoscopic cholecystectomy with normal cholangiogram 10/27/20  Gallbladder: Chronic cholecystitis and cholelithiasis.  Unremarkable regional lymph node. 11/13/2020; Seen by Dr. Misty Kat from General surgery team; cleared to re-start chemotherapy. Cycle # 96 FOLFIRI + Avastin was on 11/17/2020. CEA 3.6 on 11/16/2020. Cycle # 97 FOLFIRI + Avastin was on 12/01/2020. CEA 3.5 on 11/30/2020. Cycle # 98 FOLFIRI + Avastin was on 12/15/2020. CEA 4.3 on 12/15/2020. Cycle # 99 FOLFIRI + Avastin was on 01/05/2021. CEA 4.7 on 01/04/2021. CT chest 01/13/2021 negative for metastatic disease. CT abdomen/pelvis 01/13/2021 Unchanged central hepatic lesion. No specific signs of abdominopelvic metastatic disease. Continue FOLFIRI + Avastin and repeat scans in 3 months. Cycle # 100 FOLFIRI + Avastin was on 01/19/2021. CEA 4.7 on 01/18/2021. Cycle # 101 FOLFIRI + Avastin was on 02/02/2021. CEA 5.2 on 02/01/2021. Cycle # 102 FOLFIRI + Avastin was on 02/16/2021. CEA 5.6 on 02/15/2021. Cycle # 103 FOLFIRI + Avastin was on 03/02/2021. Cycle # 104 FOLFIRI (20% dose reduced due to significant toxicity) + Avastin was on 03/16/2021. Cycle # 105 FOLFIRI (same dose as cycle # 104) + Avastin was on 03/30/2021. CEA 6.5 on 03/29/2021. Cycle # 106 FOLFIRI (same dose as cycle # 104) + Avastin is today 04/13/2021.  CEA 6.0 on 04/12/2021    RTC 2 weeks for Cycle # 107 FOLFIRI + Avastin.  Scans to be done in the interim  MSI testing noted no mismatch repair protein loss of expression    4/13/2021  Trini Allen MD  Board Certified Medical Oncologist

## 2021-04-15 ENCOUNTER — HOSPITAL ENCOUNTER (OUTPATIENT)
Dept: INFUSION THERAPY | Age: 71
Discharge: HOME | End: 2021-04-15
Payer: MEDICARE

## 2021-04-15 DIAGNOSIS — C78.7 RECTAL CANCER METASTASIZED TO LIVER (HCC): ICD-10-CM

## 2021-04-15 DIAGNOSIS — C20 RECTAL CANCER METASTASIZED TO LIVER (HCC): ICD-10-CM

## 2021-04-15 DIAGNOSIS — Z51.11 ENCOUNTER FOR ANTINEOPLASTIC CHEMOTHERAPY: Primary | ICD-10-CM

## 2021-04-15 PROCEDURE — 6360000002 HC RX W HCPCS: Performed by: INTERNAL MEDICINE

## 2021-04-15 PROCEDURE — 96372 THER/PROPH/DIAG INJ SC/IM: CPT

## 2021-04-15 RX ADMIN — PEGFILGRASTIM-CBQV 6 MG: 6 INJECTION, SOLUTION SUBCUTANEOUS at 13:30

## 2021-04-20 ENCOUNTER — HOSPITAL ENCOUNTER (OUTPATIENT)
Dept: CT IMAGING | Age: 71
Discharge: HOME | End: 2021-04-20
Payer: MEDICARE

## 2021-04-20 DIAGNOSIS — C20 RECTAL CANCER (HCC): ICD-10-CM

## 2021-04-20 PROCEDURE — 74177 CT ABD & PELVIS W/CONTRAST: CPT

## 2021-04-20 PROCEDURE — 71260 CT THORAX DX C+: CPT

## 2021-04-20 PROCEDURE — 6360000004 HC RX CONTRAST MEDICATION: Performed by: RADIOLOGY

## 2021-04-20 RX ADMIN — IOHEXOL 50 ML: 240 INJECTION, SOLUTION INTRATHECAL; INTRAVASCULAR; INTRAVENOUS; ORAL at 14:15

## 2021-04-20 RX ADMIN — IOPAMIDOL 75 ML: 755 INJECTION, SOLUTION INTRAVENOUS at 14:15

## 2021-04-26 ENCOUNTER — HOSPITAL ENCOUNTER (OUTPATIENT)
Age: 71
Discharge: HOME | End: 2021-04-26
Payer: MEDICARE

## 2021-04-26 DIAGNOSIS — C20 RECTAL CANCER METASTASIZED TO LIVER (HCC): ICD-10-CM

## 2021-04-26 DIAGNOSIS — C79.51 BONE METASTASIS (HCC): ICD-10-CM

## 2021-04-26 DIAGNOSIS — C78.7 RECTAL CANCER METASTASIZED TO LIVER (HCC): ICD-10-CM

## 2021-04-26 LAB
ALBUMIN SERPL-MCNC: 3.6 G/DL (ref 3.5–5.2)
ALP BLD-CCNC: 200 U/L (ref 40–129)
ALT SERPL-CCNC: 15 U/L (ref 0–40)
ANION GAP SERPL CALCULATED.3IONS-SCNC: 12 MMOL/L (ref 7–16)
ANISOCYTOSIS: ABNORMAL
AST SERPL-CCNC: 22 U/L (ref 0–39)
BASOPHILS ABSOLUTE: 0.02 E9/L (ref 0–0.2)
BASOPHILS RELATIVE PERCENT: 0.9 % (ref 0–2)
BILIRUB SERPL-MCNC: 0.7 MG/DL (ref 0–1.2)
BUN BLDV-MCNC: 15 MG/DL (ref 6–23)
CALCIUM SERPL-MCNC: 8.4 MG/DL (ref 8.6–10.2)
CHLORIDE BLD-SCNC: 108 MMOL/L (ref 98–107)
CO2: 22 MMOL/L (ref 22–29)
CREAT SERPL-MCNC: 1.2 MG/DL (ref 0.7–1.2)
EOSINOPHILS ABSOLUTE: 0.04 E9/L (ref 0.05–0.5)
EOSINOPHILS RELATIVE PERCENT: 1.7 % (ref 0–6)
GFR AFRICAN AMERICAN: >60
GFR NON-AFRICAN AMERICAN: 60 ML/MIN/1.73
GLUCOSE BLD-MCNC: 116 MG/DL (ref 74–99)
HCT VFR BLD CALC: 26.5 % (ref 37–54)
HEMOGLOBIN: 7.7 G/DL (ref 12.5–16.5)
LYMPHOCYTES ABSOLUTE: 0.25 E9/L (ref 1.5–4)
LYMPHOCYTES RELATIVE PERCENT: 11.3 % (ref 20–42)
MCH RBC QN AUTO: 30.2 PG (ref 26–35)
MCHC RBC AUTO-ENTMCNC: 29.1 % (ref 32–34.5)
MCV RBC AUTO: 103.9 FL (ref 80–99.9)
MONOCYTES ABSOLUTE: 0.09 E9/L (ref 0.1–0.95)
MONOCYTES RELATIVE PERCENT: 3.5 % (ref 2–12)
NEUTROPHILS ABSOLUTE: 1.91 E9/L (ref 1.8–7.3)
NEUTROPHILS RELATIVE PERCENT: 82.6 % (ref 43–80)
OVALOCYTES: ABNORMAL
PDW BLD-RTO: 19.3 FL (ref 11.5–15)
PLATELET # BLD: 111 E9/L (ref 130–450)
PMV BLD AUTO: 11.1 FL (ref 7–12)
POIKILOCYTES: ABNORMAL
POLYCHROMASIA: ABNORMAL
POTASSIUM SERPL-SCNC: 4.3 MMOL/L (ref 3.5–5)
RBC # BLD: 2.55 E12/L (ref 3.8–5.8)
SODIUM BLD-SCNC: 142 MMOL/L (ref 132–146)
TEAR DROP CELLS: ABNORMAL
TOTAL PROTEIN: 6.1 G/DL (ref 6.4–8.3)
WBC # BLD: 2.3 E9/L (ref 4.5–11.5)

## 2021-04-26 PROCEDURE — 80053 COMPREHEN METABOLIC PANEL: CPT

## 2021-04-26 PROCEDURE — 85025 COMPLETE CBC W/AUTO DIFF WBC: CPT

## 2021-04-26 PROCEDURE — 36415 COLL VENOUS BLD VENIPUNCTURE: CPT

## 2021-04-27 ENCOUNTER — TELEPHONE (OUTPATIENT)
Dept: INFUSION THERAPY | Age: 71
End: 2021-04-27

## 2021-04-27 ENCOUNTER — OFFICE VISIT (OUTPATIENT)
Dept: ONCOLOGY | Age: 71
End: 2021-04-27
Payer: MEDICARE

## 2021-04-27 ENCOUNTER — HOSPITAL ENCOUNTER (OUTPATIENT)
Dept: INFUSION THERAPY | Age: 71
Discharge: HOME | End: 2021-04-27
Payer: MEDICARE

## 2021-04-27 VITALS
TEMPERATURE: 97.5 F | WEIGHT: 177 LBS | DIASTOLIC BLOOD PRESSURE: 64 MMHG | BODY MASS INDEX: 23.46 KG/M2 | HEART RATE: 64 BPM | SYSTOLIC BLOOD PRESSURE: 111 MMHG | RESPIRATION RATE: 18 BRPM | HEIGHT: 73 IN | OXYGEN SATURATION: 100 %

## 2021-04-27 VITALS — SYSTOLIC BLOOD PRESSURE: 124 MMHG | DIASTOLIC BLOOD PRESSURE: 65 MMHG | RESPIRATION RATE: 18 BRPM | HEART RATE: 69 BPM

## 2021-04-27 DIAGNOSIS — C20 RECTAL CANCER (HCC): Primary | ICD-10-CM

## 2021-04-27 DIAGNOSIS — C20 RECTAL CANCER METASTASIZED TO LIVER (HCC): Primary | ICD-10-CM

## 2021-04-27 DIAGNOSIS — C79.51 BONE METASTASIS (HCC): ICD-10-CM

## 2021-04-27 DIAGNOSIS — C78.7 RECTAL CANCER METASTASIZED TO LIVER (HCC): Primary | ICD-10-CM

## 2021-04-27 PROCEDURE — 96413 CHEMO IV INFUSION 1 HR: CPT

## 2021-04-27 PROCEDURE — 96409 CHEMO IV PUSH SNGL DRUG: CPT

## 2021-04-27 PROCEDURE — 96368 THER/DIAG CONCURRENT INF: CPT

## 2021-04-27 PROCEDURE — 96415 CHEMO IV INFUSION ADDL HR: CPT

## 2021-04-27 PROCEDURE — 2580000003 HC RX 258: Performed by: INTERNAL MEDICINE

## 2021-04-27 PROCEDURE — 96417 CHEMO IV INFUS EACH ADDL SEQ: CPT

## 2021-04-27 PROCEDURE — 96372 THER/PROPH/DIAG INJ SC/IM: CPT

## 2021-04-27 PROCEDURE — 6360000002 HC RX W HCPCS: Performed by: INTERNAL MEDICINE

## 2021-04-27 PROCEDURE — 99214 OFFICE O/P EST MOD 30 MIN: CPT | Performed by: INTERNAL MEDICINE

## 2021-04-27 PROCEDURE — 96375 TX/PRO/DX INJ NEW DRUG ADDON: CPT

## 2021-04-27 PROCEDURE — 96366 THER/PROPH/DIAG IV INF ADDON: CPT

## 2021-04-27 RX ORDER — DIPHENHYDRAMINE HYDROCHLORIDE 50 MG/ML
50 INJECTION INTRAMUSCULAR; INTRAVENOUS ONCE
Status: CANCELLED | OUTPATIENT
Start: 2021-04-27 | End: 2021-04-27

## 2021-04-27 RX ORDER — SODIUM CHLORIDE 9 MG/ML
INJECTION, SOLUTION INTRAVENOUS CONTINUOUS
Status: CANCELLED | OUTPATIENT
Start: 2021-04-27

## 2021-04-27 RX ORDER — ATROPINE SULFATE 0.4 MG/ML
0.4 AMPUL (ML) INJECTION ONCE
Status: COMPLETED | OUTPATIENT
Start: 2021-04-27 | End: 2021-04-27

## 2021-04-27 RX ORDER — HEPARIN SODIUM (PORCINE) LOCK FLUSH IV SOLN 100 UNIT/ML 100 UNIT/ML
500 SOLUTION INTRAVENOUS PRN
Status: DISCONTINUED | OUTPATIENT
Start: 2021-04-27 | End: 2021-04-28 | Stop reason: HOSPADM

## 2021-04-27 RX ORDER — PALONOSETRON HYDROCHLORIDE 0.05 MG/ML
0.25 INJECTION, SOLUTION INTRAVENOUS ONCE
Status: CANCELLED | OUTPATIENT
Start: 2021-04-27

## 2021-04-27 RX ORDER — 0.9 % SODIUM CHLORIDE 0.9 %
10 VIAL (ML) INJECTION ONCE
Status: CANCELLED | OUTPATIENT
Start: 2021-04-27 | End: 2021-04-27

## 2021-04-27 RX ORDER — SODIUM CHLORIDE 0.9 % (FLUSH) 0.9 %
10 SYRINGE (ML) INJECTION PRN
Status: DISCONTINUED | OUTPATIENT
Start: 2021-04-27 | End: 2021-04-28 | Stop reason: HOSPADM

## 2021-04-27 RX ORDER — DEXAMETHASONE SODIUM PHOSPHATE 10 MG/ML
10 INJECTION, SOLUTION INTRAMUSCULAR; INTRAVENOUS ONCE
Status: CANCELLED | OUTPATIENT
Start: 2021-04-27

## 2021-04-27 RX ORDER — HEPARIN SODIUM (PORCINE) LOCK FLUSH IV SOLN 100 UNIT/ML 100 UNIT/ML
500 SOLUTION INTRAVENOUS PRN
Status: CANCELLED | OUTPATIENT
Start: 2021-04-27

## 2021-04-27 RX ORDER — EPINEPHRINE 1 MG/ML
0.3 INJECTION, SOLUTION, CONCENTRATE INTRAVENOUS PRN
Status: CANCELLED | OUTPATIENT
Start: 2021-04-27

## 2021-04-27 RX ORDER — PALONOSETRON HYDROCHLORIDE 0.05 MG/ML
0.25 INJECTION, SOLUTION INTRAVENOUS ONCE
Status: COMPLETED | OUTPATIENT
Start: 2021-04-27 | End: 2021-04-27

## 2021-04-27 RX ORDER — ATROPINE SULFATE 0.4 MG/ML
0.4 AMPUL (ML) INJECTION ONCE
Status: CANCELLED | OUTPATIENT
Start: 2021-04-27 | End: 2021-04-27

## 2021-04-27 RX ORDER — FLUOROURACIL 50 MG/ML
650 INJECTION, SOLUTION INTRAVENOUS ONCE
Status: COMPLETED | OUTPATIENT
Start: 2021-04-27 | End: 2021-04-27

## 2021-04-27 RX ORDER — FLUOROURACIL 50 MG/ML
650 INJECTION, SOLUTION INTRAVENOUS ONCE
Status: CANCELLED | OUTPATIENT
Start: 2021-04-27

## 2021-04-27 RX ORDER — SODIUM CHLORIDE 0.9 % (FLUSH) 0.9 %
10 SYRINGE (ML) INJECTION PRN
Status: CANCELLED | OUTPATIENT
Start: 2021-04-27

## 2021-04-27 RX ORDER — SODIUM CHLORIDE 9 MG/ML
250 INJECTION, SOLUTION INTRAVENOUS CONTINUOUS
Status: DISCONTINUED | OUTPATIENT
Start: 2021-04-27 | End: 2021-04-28 | Stop reason: HOSPADM

## 2021-04-27 RX ORDER — METHYLPREDNISOLONE SODIUM SUCCINATE 125 MG/2ML
125 INJECTION, POWDER, LYOPHILIZED, FOR SOLUTION INTRAMUSCULAR; INTRAVENOUS ONCE
Status: CANCELLED | OUTPATIENT
Start: 2021-04-27 | End: 2021-04-27

## 2021-04-27 RX ORDER — SODIUM CHLORIDE 9 MG/ML
250 INJECTION, SOLUTION INTRAVENOUS CONTINUOUS
Status: CANCELLED | OUTPATIENT
Start: 2021-04-27 | End: 2021-05-15

## 2021-04-27 RX ORDER — DEXAMETHASONE SODIUM PHOSPHATE 10 MG/ML
10 INJECTION INTRAMUSCULAR; INTRAVENOUS ONCE
Status: COMPLETED | OUTPATIENT
Start: 2021-04-27 | End: 2021-04-27

## 2021-04-27 RX ADMIN — SODIUM CHLORIDE 250 ML: 9 INJECTION, SOLUTION INTRAVENOUS at 08:45

## 2021-04-27 RX ADMIN — BEVACIZUMAB 400 MG: 400 INJECTION, SOLUTION INTRAVENOUS at 09:08

## 2021-04-27 RX ADMIN — FLUOROURACIL 650 MG: 50 INJECTION, SOLUTION INTRAVENOUS at 12:06

## 2021-04-27 RX ADMIN — DEXAMETHASONE SODIUM PHOSPHATE 10 MG: 10 INJECTION INTRAMUSCULAR; INTRAVENOUS at 08:48

## 2021-04-27 RX ADMIN — PALONOSETRON 0.25 MG: 0.25 INJECTION, SOLUTION INTRAVENOUS at 08:48

## 2021-04-27 RX ADMIN — DENOSUMAB 120 MG: 120 INJECTION SUBCUTANEOUS at 12:09

## 2021-04-27 RX ADMIN — Medication 500 UNITS: at 12:12

## 2021-04-27 RX ADMIN — ATROPINE SULFATE 0.4 MG: 0.4 INJECTION, SOLUTION INTRAMUSCULAR; INTRAVENOUS; SUBCUTANEOUS at 10:02

## 2021-04-27 RX ADMIN — LEUCOVORIN CALCIUM 650 MG: 10 INJECTION INTRAMUSCULAR; INTRAVENOUS at 10:02

## 2021-04-27 RX ADMIN — SODIUM CHLORIDE 320 MG: 9 INJECTION, SOLUTION INTRAVENOUS at 10:02

## 2021-04-27 NOTE — PROGRESS NOTES
[place] [id number]  Attending Clinic Note     Reason for Visit: Follow-up on a patient with Metastatic Rectal Cancer.     PCP: Shila Sawant MD     History of Present Illness:  67 y/o  male who was referred to see Dr. Melchor Zee (GI team) for evaluation of bright red blood per rectum, mild anemia and change in bowel habits with diarrhea. CEA 2640 on 10/19/2015. AlcP 299 AST 57 ALT 75 on 10/19/2015. Colonoscopy in 2013 noted no significant polyps, colitis or lesions at that time. Denies any Family History of colorectal cancer or polyps.     Colonoscopy on 10/19/2015 revealed:  1. Ascending polyp, 8 mm, hot snare: Tubulovillous adenoma. 2. Transverse polyp, 1 cm, hot snare: Serrated polyp most consistent with sessile serrated adenoma. 3. Five splenic flexure polyps, three - 5 mm, 7 mm, 8 mm, hot snare and biopsy: Four segments of Tubular Adenoma  4. Descending polyp, 5 mm, biopsy: Tubular adenoma. 5. Sigmoid polyp, 4 mm biopsy, Serrated polyp most consistent with sessile serrated adenoma. 6. Two rectal polyps, 4 mm biopsy: Serrated polyp most consistent with hyperplastic polyp. 7. Rectosigmoid colon mass (large mass approximately 65% circumference of the lumen; Very friable, firm and hard): Tubulovillous adenoma with associated focal erosion and fibroplasia.      CT scan abdomen/pelvis on 10/26/2015:  1. Small nodules at lung bases likely represent metastatic colon   cancer. 2. Extensive likely metastatic colon cancer throughout the liver.      3. Mild mural thickening and luminal narrowing in the   terminal ileum, otherwise nonspecific.      Colonoscopy with snare removal rectal mass was performed by Dr. Ruth Day. Pathology proved:  Rectal polyp: Invasive adenocarcinoma involving villous adenoma and extending to the cauterized edge of excision. KRAS Mutation: Mutation detected.   BRAF Mutation: Mutation not detected. 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     CEA 3488. Bone scan on 11/10/2015 noted no metastatic disease. CT chest on 11/10/2015 revealed Multiple pulmonary nodules in the upper and lower lobes consistent with metastatic disease;   Hepatic metastasis also visualized. 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 Mediport was placed. Cycle # 1 of FOLFOX + Avastin was on 11/23/2015. CEA was 4555 on 11/23/2015. Cycle # 2 of FOLFOX + Avastin was on 12/08/2015. CEA was 3160 on 12/08/2015. Cycle # 3 of FOLFOX + Avastin was on 12/22/2015. CEA was 2516 on 12/21/2015. Cycle # 4 of FOLFOX + Avastin was on 01/05/2016. CEA was 2098 on 01/05/2015. Cycle # 5 of FOLFOX + Avastin was on 01/19/2016. CEA was 1511 on 01/05/2015.     -Bone scan on 01/26/2016 noted no metastatic disease. CT chest on 01/26/2016 revealed Significant response to treatment with no visible residual nodules. CT scan abdomen/pelvis on 01/26/2016 noted Interval decreased size of multiple masses in the liver compatible with treatment response. Continue another 2 months of FOLFOX + Avastin and repeat scans. Cycle # 6 of FOLFOX + Avastin was on 02/02/2016.  on 02/02/2016. Cycle # 7 of FOLFOX + Avastin was on 02/16/2016.  on 02/16/2016. Cycle # 8 of FOLFOX + Avastin was on 03/01/2016.  on 03/01/2016. Cycle # 9 of FOLFOX + Avastin was on 03/15/2016.  on 03/15/2016. Cycle # 10 of FOLFOX + Avastin was on 03/29/2016. .4 on 03/29/2016. Cycle # 11 of FOLFOX + Avastin was on 04/12/2016. .4 on 04/12/2016.     Re-staging scans on 04/19/2016: CT Chest: clear lungs; no evidence of recurring pulmonary nodule; CT Abdomen/Pelvis: Further interval decrease in size of the multiple metastatic hepatic lesions, the largest lesion now measures 3.9 x 3.5 cm and previously measured 4.5 cm in maximum diameter.  No mesenteric lymphadenopathy is identified; Bone Scan: No evidence of osseous metastasis. Continue same regimen and re-stage in 2-3 months. Cycle # 12 FOLFOX + Avastin was on 04/26/2016. .5 on 04/26/2016. Cycle # 13 FOLFOX + Avastin was on 05/10/2016. .3 on 05/10/2016. Cycle # 14 FOLFOX+AVASTIN was on 05/24/2016. .5 on 05/24/2016. Cycle # 15 FOLFOX + Avastin was on 06/07/2016. CEA 90.5 on 06/07/2016. Admitted to [facility] 06/13/2016-06/16/2016 for abdominal pain: EGD noted 1.5 cm clean based duodenal bulb ulceration s/p epinephrine and bicap per Dr. Zechariah Gillette. No active bleeding. A. Stomach, biopsy: Mild chronic gastritis, immunostain negative for Helicobacter  B. Esophagus, biopsy: Gastric glandular mucosa with prominent intestinal metaplasia (Mad[place]'s epithelium), negative for epithelial dysplasia, esophageal squamous mucosa not identified  Cycle # 16 FOLFOX (discontinued avastin (bevacizumab) given association of peptic ulcer disease and known association of GI perforation) was on 06/21/2016. Cycle # 17 FOLFOX was on 07/05/2016. CEA 52.6 on 07/19/2016. Cycle # 17 FOLFOX was on 07/05/2016. CEA 52.6 on 07/05/2016. CEA 47.6 on 07/19/2016.     Re-staging scans 07/19/2106: CT Chest negative for metastatic disease. CT Abdomen/Pelvis: Stable hepatic lesions; Question new mural thickening in the cecum. Bone Scan: New Let hip lesion suspicious for bone metastasis.     Increased CEA; new mural thickening in the cecum and new left hip lesion suspicious for bone metastasis are consistent with disease progression; He derived maximum benefit from FOLFOX/AVASTIN. D/C FOLFOX/AVASTIN. We recommended FOLFIRI second line therapy. Cycle # 1 FOLFIRI was on 08/02/2016. CEA 40.8 on 08/02/2016. Xgeva q4 weeks started on 08/02/2016. Cycle # 2 FOLFIRI was on 08/16/2016. CEA 31.7 on 08/16/2016. Cycle # 3 FOLFIRI (added Avastin) was on 08/30/2016 given that ulcers healed on EGD 08/15/2016 by Dr. Tiny Chan; Protonix bid since avastin re-started.    Colonoscopy on related to an joint rib fracture. Continue FOLFIRI + Avastin and repeat scans in 3 months. Cycle # 13 FOLFIRI + Avastin was on 01/24/2017. Cycle # 14 FOLFIRI + Avastin was on 02/07/2017. Cycle # 15 FOLFIRI + Avastin was on 02/21/2017. Cycle # 16 FOLFIRI + Avastin was on 03/07/2017. Cycle # 17 FOLFIRI + Avastin was on 03/21/2017. Cycle # 18 FOLFIRI + Avastin was on 04/04/2017. CT chest 04/17/2017 negative for metastatic disease. CT abdomen/pelvis 04/17/2017 Stable hypodense metastatic lesions within the liver. Stable area of increased sclerosis along the right acetabulum  Bone scan 04/17/2017 Stable subtle uptake within the left proximal femoral diaphysis; No definite abnormal uptake in the region of a sclerotic lesion along the posterior column of the right acetabulum noted on CT. Stable slight uptake within the left anterior sixth rib corresponding to linear sclerosis on the recent CT, favored to be related to a fracture. Continue FOLFIRI + Avastin and repeat scans in 3 months. Cycle # 19 FOLFIRI + Avastin was on 04/18/2017. Cycle # 20 FOLFIRI + Avastin was on 05/02/2017. Cycle # 21 FOLFIRI + Avastin was on 05/16/2017. Cycle # 22 FOLFIRI + Avastin was on 05/30/2017. Cycle # 23 FOLFIRI + Avastin was on 06/13/2017. Cycle # 24 FOLFIRI + Avastin was on 06/27/2017. Cycle # 25 FOLFIRI + Avastin was on 07/11/2017. Cycle # 26 FOLFIRI + Avastin was on 07/25/2017. Cycle # 27 FOLFIRI + Avastin was on 08/08/2017. Cycle # 28 FOLFIRI + Avastin was on 08/22/2017. Cycle # 29 FOLFIRI + Avastin was on 09/05/2017. Cycle # 30 FOLFIRI + Avastin was on 09/19/2017. Bone scan 09/26/2017 stable. CT abdomen/pelvis on 09/26/2017 Stable hypodense mass in the liver. Stable sclerotic lesion in the acetabulum. CT chest 09/26/2017 negative for metastatic disease. Cycle # 31 FOLFIRI + Avastin was on 10/03/2017. CEA 7.4 on 10/03/2017. Cycle # 32 FOLFIRI + Avastin was on 10/17/2017.  CEA 6.0 on 10/17/2017. Cycle # 33 FOLFIRI + Avastin was on 10/31/2017. CEA 6.8 on 10/31/2017. Cycle # 34 FOLFIRI + Avastin was on 11/14/2017. CEA 7.2 on 11/14/2017. Cycle # 35 FOLFIRI + Avastin was on 11/28/2017. CEA 5.1 on 11/28/2017. Cycle # 36 FOLFIRI + Avastin was on 12/12/2017. CEA 6.1 on 12/12/2017. Bone scan 12/22/2017 noted no evidence of metastatic disease to the axial or appendicular skeleton. CT chest 12/22/2017 noted no evidence of metastatic disease. CT abdomen/pelvis 12/22/2017 noted stable hypodense lesions in the liver. Continue FOLFIRI + Avastin and repeat scans in 3 months. Cycle # 37 FOLFIRI + Avastin was on 12/27/2018. Cycle # 38 FOLFIRI + Avastin was on 01/09/2018. Cycle # 39 FOLFIRI + Avastin was on 01/23/2018. Cycle # 40 FOLFIRI + Avastin was on 02/06/2018. Cycle # 41 FOLFIRI + Avastin was on 02/20/2018. Cycle # 42 FOLFIRI + Avastin was on 03/06/2018. Cycle # 43 FOLFIRI + Avastin was on 03/20/2018. Bone scan on 03/26/2018 negative for metastatic disease. CT chest 03/26/2018 noted ? new 7 mm nodule in LUCY. CT abdomen/pelvis 03/26/2018 noted stable hypodense lesions in the liver. ? New small ascites in the abdomen. Patient wants to continue to monitor the lung finding and repeat scans in 2 months instead of changing the current regimen into oral Lonsurf. Cycle # 44 FOLFIRI + Avastin was on 04/03/2018. CEA 6.3 on 04/03/2018. Cycle # 45 FOLFIRI + Avastin was on 04/24/2018. CEA 5.3 on 04/24/2018. Cycle # 46 FOLFIRI + Avastin was on 05/08/2018. CEA 5.4 on 05/08/2018. Cycle # 47 FOLFIRI + Avastin was on 05/22/2018. CEA 6.1 on 05/22/2018. CT chest 05/31/2018 noted stable nodule in LUCY. No evidence of worsening malignancy. CT abdomen/pelvis on 05/31/2018 noted stable liver metastasis. No evidence of worsening malignancy. Continue FOLFIRI + Avastin and repeat scans in 3 months. Cycle # 48 FOLFIRI + Avastin was on 06/06/2018. Cycle # 49 FOLFIRI + Avastin was on 06/19/2018. Cycle # 50 FOLFIRI + Avastin was on 07/03/2018. Cycle # 51 FOLFIRI + Avastin was on 07/24/2018. Cycle # 52 FOLFIRI + Avastin was on 08/14/2018. Cycle # 53 FOLFIRI + Avastin was on 08/28/2018. CT chest 09/06/2018 noted interval decrease in size of LUCY measuring up to 4 mm, suggestive of treatment response. No mediastinal or hilar LN  CT abdomen/pelvis 09/06/2018 Slight interval increase in size of a previously noted metastatic lesion within the right hepatic lobe. This may be related to differences in phase of enhancement compared to the most recent study from 5/31/2018, the lesion remains decreased in size compared to the more previous studies. No abdominal, retroperitoneal, or pelvic lymphadenopathy. Continue FOLFIRI + Avastin and repeat scans in 2 months. Cycle # 54 FOLFIRI + Avastin was on 09/11/2018. Cycle # 55 FOLFIRI + Avastin was on 09/25/2018. Cycle # 56 FOLFIRI + Avastin was on 10/09/2018. Cycle # 57 FOLFIRI + Avastin was on 10/23/2018. CT chest 11/02/2018 stable 3 mm nodule within LUCY. No new right and left lung nodule. No mediastinal or osseous lesion. CT abdomen/pelvis 11/02/2018 stable metastatic disease to liver. No new metastatic disease identified. No mesenteric or retroperitoneal LN. No gross colonic lesion identified. Continue FOLFIRI + Avastin and repeat scans in 3 months. Cycle # 58 FOLFIRI + Avastin was on 11/06/2018. CEA 7.6 on 11/05/2018. Cycle # 59 FOLFIRI + Avastin was on 11/27/2018. CEA 6.9 on 11/26/2018. Cycle # 60 FOLFIRI + Avastin was on 12/11/2018. CEA 6.7 on 12/11/2018. Cycle # 61 FOLFIRI + Avastin was on 01/08/2019. CEA 5.6 on 01/07/2019. Cycle # 62 FOLFIRI + Avastin was on 01/29/2019. CEA 4.6 on 01/28/2019. Cycle # 63 FOLFIRI + Avastin was on 02/12/2019. CEA 4.3 on 02/11/2019. CT chest 02/21/2019 no evidence of metastatic disease. CT abdomen/pelvis 02/21/2019 stable hypodense liver lesions. No evidence of worsening metastatic disease.  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 Ordered MRI thoracic spine and referred to neurosurgery team.    Cycle # 64 FOLFIRI + Avastin was on 02/26/2019. CEA 4.7 on 02/25/2019. Cycle # 65 FOLFIRI + Avastin was on 03/12/2019. CEA 4.0 on 03/11/2019. Cycle # 66 FOLFIRI + Avastin was on 03/26/2019. CEA 4.7 on 03/25/2019. Cycle # 67 FOLFIRI + Avastin was on 04/09/2019. CEA 5.6 on 04/08/2019. Cycle # 68 FOLFIRI + Avastin was on 04/30/2019. CEA 4.9 on 04/29/2019. Cycle # 69 FOLFIRI + Avastin was on 05/14/2019. CEA 5.3 on 05/14/2019. CT chest 05/23/2019 stable small lung nodule with no evidence of metastatic disease. CT abdomen/pelvis 05/23/2019 Decrease conspicuity of the liver lesions. No new lesions seen. Stable splenomegaly. Continue FOLFIRI + Avastin and repeat scans in 3 months. Cycle # 70 FOLFIRI + Avastin was on 06/04/2019. CEA 4.8 on 06/03/2019. Cycle # 71 FOLFIRI + Avastin was on 06/18/2019. CEA 4.6 on 06/17/2019. Cycle # 72 FOLFIRI + Avastin was on 07/09/2019. CEA 4.3 on 07/08/2019. Cycle # 73 FOLFIRI + Avastin was on 07/30/2019. CEA 5.0 on 07/29/2019. Cycle # 74 FOLFIRI + Avastin was on 08/13/2019. CEA 5.0 on 08/12/2019. CT chest 08/20/2019 negative  CT abdomen/pelvis 08/20/2019 Previous identified hepatic lesions are not visualized on the current exam.  Continue FOLFIRI + Avastin and repeat scans in 3 months. Cycle # 75 FOLFIRI + Avastin was on 08/27/2019. CEA 5.0 on 08/26/2019. Cycle # 76 FOLFIRI + Avastin was on 09/17/2019. CEA 5.5 on 09/16/2019. Cycle # 77 FOLFIRI + Avastin was on 10/15/2019. CEA 4.6 on 10/15/2019. Cycle # 78 FOLFIRI + Avastin was on 10/29/2019. CEA 4.1 on 10/28/2019. Cycle # 79 FOLFIRI + Avastin was on 11/12/2019. CEA 4.3 on 11/12/2019. Cycle # 80 FOLFIRI + Avastin was on 12/10/2019. CEA 4.0 on 12/09/2019.   CT chest 12/19/2019 Stable 3 mm nodule within the left upper lobe, similar to the previous studies dating back to 11/2/2018, however decreased in size compared to the CT from 3/26/2018. No thoracic LN. CT abdomen/pelvis 12/19/2019 Previously described small hypodense lesions are more conspicuous on the current study compared to the recent study throughout the liver from 8/20/2019, however similar to the prior study from 2/21/2019. Findings may be related to differences in contrast opacification. No abdominal or pelvic lymphadenopathy. Continue FOLFIRI + Avastin and repeat scans in 2 months to f/u on liver lesions. Cycle # 81 FOLFIRI + Avastin was on 01/07/2020. CEA 3.8 on 01/06/2020. Cycle # 82 FOLFIRI + Avastin was on 01/21/2020. CEA 3.7 on 01/20/2020. Cycle # 83 FOLFIRI + Avastin was on 02/04/2020. CEA 4.1 on 02/03/2020. Cycle # 84 FOLFIRI + Avastin was on 02/18/2020. CEA 4.6 on 02/17/2020. EGD by Dr. Maria Elena Clarke 03/02/2020 showing no masses or lesions  Cycle # 85 FOLFIRI + Avastin was on 03/03/2020. CEA 7.4 on 03/02/2020. Cycle # 86 FOLFIRI + Avastin was on 03/17/2020. CEA 4.5 on 03/16/2020. Colonoscopy on 03/23/2020 by Dr. Maria Elena aguero (records requested). Cycle # 87 FOLFIRI + Avastin was on 03/31/2020. CEA 4.1 on 03/30/2020. Cycle # 88 FOLFIRI + Avastin was on 04/21/2020. CEA 6.4 on 04/20/2020. Cycle # 89 FOLFIRI + Avastin was on 05/05/2020. CEA 5.8 on 05/04/2020. Cycle # 90 FOLFIRI + Avastin was on 06/02/2020. CEA 5.5 on 06/01/2020. Cycle # 91 FOLFIRI + Avastin was on 06/16/2020. CEA 5.6 on 06/15/2020. CT chest 06/23/2020 noted no metastatic disease in the chest.   CT abdomen/pelvis 06/23/2020 Stable small liver lesions measuring up to 5 mm since 2019.   22 mm round mass in segment 8 of the liver with central high density stable from December 2019), previously measuring up to 34 mm in 2017. No additional metastatic disease in the abdomen or pelvis. Small amount of ascites. Overall stable disease. Continue FOLFIRI + Avastin and repeat scans in 2-3 months. Cycle # 92 FOLFIRI + Avastin was on 07/07/2020.  CEA 5.7 on 07/06/2020. Cycle # 93 FOLFIRI + Avastin was on 07/21/2020. CEA 5.9 on 07/20/2020. Cycle # 94 FOLFIRI + Avastin was on 08/04/2020. CEA 5.5 on 08/04/2020. Cycle # 95 FOLFIRI + Avastin was on 08/25/2020. CEA 6.1 on 08/24/2020. CT chest 9/2/2020 stable unremarkable enhanced CT of the thorax. There is no metastatic disease to the lungs. CT abdomen pelvis on 9/2/2020 stable 2.2 cm low attenuated lesion within the central aspect of the right hepatic lobe favored to represent treated metastatic disease. No new hepatic lesion is identified. Stable splenomegaly  There is no appreciable colonic lesion or stricture  Pericholecystic fluid of uncertain etiology. General surgery team consulted. Laparoscopic cholecystectomy with normal cholangiogram 10/27/20  Gallbladder: Chronic cholecystitis and cholelithiasis.  Unremarkable regional lymph node. 11/13/2020; Seen by Dr. Junior Metzger from General surgery team; cleared to re-start chemotherapy. Cycle # 96 FOLFIRI + Avastin was on 11/17/2020. CEA 3.6 on 11/16/2020. Cycle # 97 FOLFIRI + Avastin was on 12/01/2020. CEA 3.5 on 11/30/2020. Cycle # 98 FOLFIRI + Avastin was on 12/15/2020. CEA 4.3 on 12/15/2020. Cycle # 99 FOLFIRI + Avastin was on 01/05/2021. CEA 4.7 on 01/04/2021. CT chest 01/13/2021 negative for metastatic disease. CT abdomen/pelvis 01/13/2021 Unchanged central hepatic lesion. No specific signs of abdominopelvic metastatic disease. Continue FOLFIRI + Avastin and repeat scans in 3 months. Cycle # 100 FOLFIRI + Avastin was on 01/19/2021. CEA 4.7 on 01/18/2021. Cycle # 101 FOLFIRI + Avastin was on 02/02/2021. CEA 5.2 on 02/01/2021. Cycle # 102 FOLFIRI + Avastin was on 02/16/2021. CEA 5.6 on 02/15/2021. Cycle # 103 FOLFIRI + Avastin was on 03/02/2021. Cycle # 104 FOLFIRI (20% dose reduced due to significant toxicity) + Avastin was on 03/16/2021. Cycle # 105 FOLFIRI (same dose as cycle # 104) + Avastin was on 03/30/2021.  CEA 6.5 on 03/29/2021. Cycle # 106 FOLFIRI (same dose as cycle # 104) + Avastin was on 04/13/2021. CEA 6.0 on 04/12/2021  Today 04/27/2021 for f/u and review scans. No fever, chills. improved fatigue. Felt better with chemo dose reduction. Review of Systems;  CONSTITUTIONAL: No fever, chills. Fair appetite. improved fatigue. ENMT: Eyes: No diplopia; Nose: No epistaxis. Mouth:No lesions  RESPIRATORY: No hemoptysis, shortness of breath. CARDIOVASCULAR: No chest pain, palpitations. GASTROINTESTINAL:No N/V   GENITOURINARY: No dysuria, urinary frequency, hematuria. NEURO: No syncope, presyncope, headache. Remainder ROS: Negative. Past Medical History:   Past Medical History               Diagnosis Date    Arthritis      Cancer ([facility] [address]         colon    Depression      Hyperlipidemia      Hypertension           Medications:  Reviewed and reconciled.     Allergies: Allergies   Allergen Reactions    Neosporin [Neomycin-Polymyxin-Gramicidin] Rash    Tape Gabbie Christina Tape] Rash      Physical Exam:  /64   Pulse 64   Temp 97.5 °F (36.4 °C)   Resp 18   Ht 6' 1\" (1.854 m)   Wt 177 lb (80.3 kg)   SpO2 100%   BMI 23.35 kg/m²   GENERAL: Alert, oriented x 3, not in distress  HEENT: PERRLA, EOMI. NECK: Supple. Without lymphadenopathy. LUNGS: Lungs are CTA bilaterally, with no wheezing, crackles or rhonchi. CARDIOVASCULAR: Regular rhythm. No murmurs, rubs or gallops. ABDOMEN: Soft. Non-tender, non-distended. Positive bowel sounds. EXTREMITIES: Without clubbing, cyanosis or edema. Skin toxicity in palms  NEUROLOGIC: No focal deficits.    ECOG PS 1    Diagnostics:  Lab Results   Component Value Date    WBC 2.3 (L) 04/26/2021    HGB 7.7 (L) 04/26/2021    HCT 26.5 (L) 04/26/2021    .9 (H) 04/26/2021     (L) 04/26/2021     Lab Results   Component Value Date     04/26/2021    K 4.3 04/26/2021     (H) 04/26/2021    CO2 22 04/26/2021    BUN 15 04/26/2021    CREATININE 1.2 04/26/2021 02/16/2016. Cycle # 8 of FOLFOX + Avastin was on 03/01/2016.  on 03/01/2016. Cycle # 9 of FOLFOX + Avastin was on 03/15/2016.  on 03/15/2016. Cycle # 10 of FOLFOX + Avastin was on 03/29/2016. .4 on 03/29/2016. Cycle # 11 of FOLFOX + Avastin was on 04/12/2016. .4 on 04/12/2016.     Re-staging scans on 04/19/2016: CT Chest: clear lungs; no evidence of recurring pulmonary nodule; CT Abdomen/Pelvis: Further interval decrease in size of the multiple metastatic hepatic lesions, the largest lesion now measures 3.9 x 3.5 cm and previously measured 4.5 cm in maximum diameter. No mesenteric lymphadenopathy is identified; Bone Scan: No evidence of osseous metastasis. Continue same regimen and re-stage in 2-3 months. Cycle # 12 FOLFOX + Avastin was on 04/26/2016. .5 on 04/26/2016. Cycle # 13 FOLFOX + Avastin was on 05/10/2016. .3 on 05/10/2016. Cycle # 14 FOLFOX+AVASTIN was on 05/24/2016. .5 on 05/24/2016. Cycle # 15 FOLFOX + Avastin was on 06/07/2016. CEA 90.5 on 06/07/2016. Admitted to [facility] 06/13/2016-06/16/2016 for abdominal pain: EGD noted 1.5 cm clean based duodenal bulb ulceration s/p epinephrine and bicap per Dr. Alejo Hoffman. No active bleeding. A. Stomach, biopsy: Mild chronic gastritis, immunostain negative for Helicobacter  B. Esophagus, biopsy: Gastric glandular mucosa with prominent ntestinal metaplasia ([place]'s epithelium), negative for epithelial dysplasia, esophageal squamous mucosa not identified     Cycle # 16 FOLFOX (discontinued avastin (bevacizumab) given association of peptic ulcer disease and known association of GI perforation.) was on 06/21/2016. CEA 47 on 06/21/2016. Cycle # 17 FOLFOX was on 07/05/2016. CEA 52.6 on 07/05/2016. CEA 47.6 on 07/19/2016.     Re-staging scans 07/19/2106: CT Chest negative for metastatic disease. CT Abdomen/Pelvis: Stable hepatic lesions; Question new mural thickening in the cecum.  Bone Scan: New Let hip lesion suspicious for bone metastasis.     Increased CEA; new mural thickening in the cecum and new left hip lesion suspicious for bone metastasis are consistent with disease progression; He derived maximum benefit from FOLFOX/AVASTIN. D/C FOLFOX/AVASTIN. We recommended FOLFIRI second line therapy. Cycle # 1 FOLFIRI was on 08/02/2016. CEA 40.8 on 08/02/2016. Xgeva q4 weeks started on 08/02/2016. Cycle # 2 FOLFIRI was on 08/16/2016. CEA 31.7 on 08/16/2016. Cycle # 3 FOLFIRI (added Avastin) was on 08/30/2016 given that ulcers healed on EGD 08/15/2016 by Dr. Shy Hoskins; Protonix bid since avastin re-started. Colonoscopy on 08/29/2016 (to look at the cecal area) unremarkable. CEA 26.7 on 08/30/2016. Cycle # 4 FOLFIRI/Avastin was on 09/13/2016. CEA 23.4 on 09/13/2016. Cycle # 5 FOLFIRI/Avastin was on 09/27/2016. CEA 22.3 on 09/27/2016. Cycle # 6 FOLFIRI/Avastin was on 10/11/2016. CEA 18.4 on 10/11/2016.      Bone scan 10/21/2016 stable bone metastasis; ? New lesion anterior left sixth rib likely interval healing fracture. CT abdomen/pelvis revealed stable hepatic metastasis. CT chest negative for metastatic disease. Continue FOLFIRI/Avastin and repeat scans in 3 months. Cycle # 7 FOLFIRI/Avastin was on 10/25/2016. CEA 16.1 on 10/25/2016. Cycle # 8 FOLFIRI/Avastin was on 11/08/2016. CEA 15.7 on 11/08/2016. Cycle # 9 FOLFIRI/Avastin was on 11/29/2016. CEA 13.6 on 11/29/2016. Cycle # 10 FOLFIRI/Avastin was on 12/13/2016. CEA 10.6 on 12/13/2016. Cycle # 11 FOLFIRI/Avastin was on 12/27/2016. CEA 11.1 on 12/27/2016. Cycle # 12 FOLFIRI/Avastin was on 01/10/2017. CEA 9.7 on 01/10/2017. CT chest 01/23/2017 No new pulmonary nodule or lymphadenopathy. Patchy groundglass opacities within the left lower lobe, suggestive of infectious or inflammatory etiology. Followup CT chest in 3 months.  Slight increased linear sclerosis along the left anterior sixth rib corresponding to an area of increased uptake on the CEA 8.2 on 05/30/2017. Cycle # 23 FOLFIRI + Avastin was on 06/13/2017. CEA 7.7 on 06/13/2017. Cycle # 24 FOLFIRI + Avastin was on 06/27/2017. CEA 6.6 on 06/27/2017. Re-staging scans 07/05/2017:  Bone scan stable proximal left femoral diaphysis, right acetabulum, and left anterior sixth rib lesions;  CT abdomen/pelvis stable hypodense metastatic lesions within the liver; CT chest without convincing evidence of metastatic disease. Cycle # 25 FOLFIRI + Avastin was on 07/11/2017. CEA 6.3 on 07/11/2017. Cycle # 26 FOLFIRI + Avastin was on 07/25/2017. CEA 7.3 on 07/25/2017. Cycle # 27 FOLFIRI + Avastin was on 08/08/2017. CEA 6.5 on 08/08/2017. Cycle # 28 FOLFIRI + Avastin was on 08/22/2017. CEA 5.9 on 08/22/2017. Cycle # 29 FOLFIRI + Avastin was on 09/05/2017. CEA 6.3 on 09/05/2017. Cycle # 30 FOLFIRI + Avastin was on 09/19/2017. CEA 6.3 on 09/19/2017. Bone scan 09/26/2017 stable. CT abdomen/pelvis on 09/26/2017 Stable hypodense mass in the liver. Stable sclerotic lesion in the acetabulum. CT chest 09/26/2017 negative for metastatic disease. Cycle # 31 FOLFIRI + Avastin was on 10/03/2017. CEA 7.4 on 10/03/2017. Cycle # 32 FOLFIRI + Avastin was on 10/17/2017. CEA 6.0 on 10/17/2017. Cycle # 33 FOLFIRI + Avastin was on 10/31/2017. CEA 6.8 on 10/31/2017. Cycle # 34 FOLFIRI + Avastin was on 11/14/2017. CEA 7.2 on 11/14/2017. Cycle # 35 FOLFIRI + Avastin was on 11/28/2017. CEA 5.1 on 11/28/2017. Cycle # 36 FOLFIRI + Avastin was on 12/12/2017. CEA 6.1 on 12/12/2017. Bone scan 12/22/2017 noted no evidence of metastatic disease to the axial or appendicular skeleton. CT chest 12/22/2017 noted no evidence of metastatic disease. CT abdomen/pelvis 12/22/2017 noted stable hypodense lesions in the liver. Continue FOLFIRI + Avastin and repeat scans in 3 months. Cycle # 37 FOLFIRI + Avastin was on 12/27/2018. CEA 6.7 on 12/27/2017. Cycle # 38 FOLFIRI + Avastin was on 01/09/2018.  CEA 5.0 on 01/09/2018. Cycle # 39 FOLFIRI + Avastin was on 01/23/2018. CEA 6.5 on 01/23/2018. Cycle # 40 FOLFIRI + Avastin was on 02/06/2018. CEA 6.9 on 02/06/2018. Cycle # 41 FOLFIRI + Avastin was on 02/20/2018. CEA 6.4 on 02/20/2018. Cycle # 42 FOLFIRI + Avastin was on 03/06/2018. CEA 6.6 on 03/06/2018. Cycle # 43 FOLFIRI + Avastin was on 03/20/2018. CEA 5.7 on 03/20/2018. Bone scan on 03/26/2018 negative for metastatic disease. CT chest 03/26/2018 noted ? new 7 mm nodule in LUCY. CT abdomen/pelvis 03/26/2018 noted stable hypodense lesions in the liver. ? New small ascites in the abdomen. Patient wants to continue to monitor the lung finding and repeat scans in 2 months instead of changing the current chemotherapy regimen to oral Lonsurf. Cycle # 44 FOLFIRI + Avastin was on 04/03/2018. CEA 6.3 on 04/03/2018. Cycle # 45 FOLFIRI + Avastin was on 04/24/2018. CEA 5.3 on 04/24/2018. Cycle # 46 FOLFIRI + Avastin was on 05/08/2018. CEA 5.4 on 05/08/2018. Cycle # 47 FOLFIRI + Avastin was on 05/22/2018. CEA 6.1 on 05/22/2018. CT chest 05/31/2018 noted stable nodule in LUCY. No evidence of worsening malignancy. CT abdomen/pelvis on 05/31/2018 noted stable liver metastasis. No evidence of worsening malignancy. Continue FOLFIRI + Avastin and repeat scans in 3 months. Cycle # 48 FOLFIRI + Avastin was on 06/06/2018. CEA 6.3 on 06/05/2018. Cycle # 49 FOLFIRI + Avastin was on 06/19/2018. CEA 6.1 on 06/19/2018. Cycle # 50 FOLFIRI + Avastin was on 07/03/2018. CEA 7.4 on 07/03/2018. Cycle # 51 FOLFIRI + Avastin was on 07/24/2018. CEA 6.7 on 07/24/2018. Cycle # 52 FOLFIRI + Avastin was on 08/14/2018. CEA 6.8 on 08/14/2018. Cycle # 53 FOLFIRI + Avastin was on 08/28/2018. CEA 6.5 on 08/27/2018. CT chest 09/06/2018 noted interval decrease in size of LUCY measuring up to 4 mm, suggestive of treatment response.  No mediastinal or hilar LN  CT abdomen/pelvis 09/06/2018 Slight interval increase in size of a previously noted metastatic lesion within the right hepatic lobe. This may be related to differences in phase of enhancement compared to the most recent study from 5/31/2018, the lesion remains decreased in size compared to the more previous studies. No abdominal, retroperitoneal, or pelvic lymphadenopathy. Continue FOLFIRI + Avastin and repeat scans in 2 months. Cycle # 54 FOLFIRI + Avastin was on 09/11/2018. CEA 6.4 on 09/10/2018. Cycle # 55 FOLFIRI + Avastin was on 09/25/2018. CEA 6.4 on 09/25/2018. Cycle # 56 FOLFIRI + Avastin was on 10/09/2018. CEA 6.7 on 10/08/2018. Cycle # 57 FOLFIRI + Avastin was on 10/23/2018. CEA 7.2 on 10/22/2018. CT chest 11/02/2018 stable 3 mm nodule within LUCY. No new right and left lung nodule. No mediastinal or osseous lesion. CT abdomen/pelvis 11/02/2018 stable metastatic disease to liver. No new metastatic disease identified. No mesenteric or retroperitoneal LN. No gross colonic lesion identified. Continue FOLFIRI + Avastin and repeat scans in 3 months. Cycle # 58 FOLFIRI + Avastin was on 11/06/2018. CEA 7.6 on 11/05/2018. Cycle # 59 FOLFIRI + Avastin was on 11/27/2018. CEA 6.9 on 11/26/2018. Cycle # 60 FOLFIRI + Avastin was on 12/11/2018. CEA 6.7 on 12/11/2018. Cycle # 61 FOLFIRI + Avastin was on 01/08/2019. CEA 5.6 on 01/07/2019. Cycle # 62 FOLFIRI + Avastin was on 01/29/2019. CEA 4.6 on 01/28/2019. Cycle # 63 FOLFIRI + Avastin was on 02/12/2019. CEA 4.3 on 02/11/2019. CT chest 02/21/2019 no evidence of metastatic disease. CT abdomen/pelvis 02/21/2019 stable hypodense liver lesions. No evidence of worsening metastatic disease. 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 Ordered MRI thoracic spine and referred to neurosurgery team.  Cycle # 64 FOLFIRI + Avastin was on 02/26/2019. CEA 4.7 on 02/25/2019. Cycle # 65 FOLFIRI + Avastin was on 03/12/2019. CEA 4.0 on 03/11/2019. Cycle # 66 FOLFIRI + Avastin was on 03/26/2019.  CEA 4.7 on 03/25/2019. Cycle # 67 FOLFIRI + Avastin was on 04/09/2019. CEA 5.6 on 04/08/2019. Cycle # 68 FOLFIRI + Avastin was on 04/30/2019. CEA 4.9 on 04/29/2019. Cycle # 69 FOLFIRI + Avastin was on 05/14/2019. CEA 5.3 on 05/14/2019. CT chest 05/23/2019 stable small lung nodule with no evidence of metastatic disease. CT abdomen/pelvis 05/23/2019 Decrease conspicuity of the liver lesions. No new lesions seen. Stable splenomegaly. Continue FOLFIRI + Avastin and repeat scans in 3 months. Cycle # 70 FOLFIRI + Avastin was on 06/04/2019. CEA 4.8 on 06/03/2019. Cycle # 71 FOLFIRI + Avastin was on 06/18/2019. CEA 4.6 on 06/17/2019. Cycle # 72 FOLFIRI + Avastin was on 07/09/2019. CEA 4.3 on 07/08/2019. Cycle # 73 FOLFIRI + Avastin was on 07/30/2019. CEA 5.0 on 07/29/2019. Cycle # 74 FOLFIRI + Avastin was on 08/13/2019. CEA 5.0 on 08/12/2019. CT chest 08/20/2019 negative  CT abdomen/pelvis 08/20/2019 Previous identified hepatic lesions are not visualized on the current exam.  Continue FOLFIRI + Avastin and repeat scans in 3 months. Cycle # 75 FOLFIRI + Avastin was on 08/27/2019. CEA 5.0 on 08/26/2019. Cycle # 76 FOLFIRI + Avastin was on 09/17/2019. CEA 5.5 on 09/16/2019. Cycle # 77 FOLFIRI + Avastin was on 10/15/2019. CEA 4.6 on 10/15/2019. Cycle # 78 FOLFIRI + Avastin was on 10/29/2019. CEA 4.1 on 10/28/2019. Cycle # 79 FOLFIRI + Avastin was on 11/12/2019. CEA 4.3 on 11/12/2019. Cycle # 80 FOLFIRI + Avastin was on 12/10/2019. CEA 4.0 on 12/09/2019. CT chest 12/19/2019 Stable 3 mm nodule within the left upper lobe, similar to the previous studies dating back to 11/2/2018, however decreased in size compared to the CT from 3/26/2018. No thoracic LN. CT abdomen/pelvis 12/19/2019 Previously described small hypodense lesions are more conspicuous on the current study compared to the recent study throughout the liver from 8/20/2019, however similar to the prior study from 2/21/2019.    Findings may be related to differences in contrast opacification. No abdominal or pelvic lymphadenopathy. Continue FOLFIRI + Avastin and repeat scans in 2 months to f/u on liver lesions. Cycle # 81 FOLFIRI + Avastin was on 01/07/2020. CEA 3.8 on 01/06/2020. Cycle # 82 FOLFIRI + Avastin was on 01/21/2020. CEA 3.7 on 01/20/2020. Cycle # 83 FOLFIRI + Avastin was on 02/04/2020. CEA 4.1 on 02/03/2020. Cycle # 84 FOLFIRI + Avastin was on 02/18/2020. CEA 4.6 on 02/17/2020. CT chest 2/28/2020 no evidence of metastatic disease to the lungs and no mediastinal or hilar adenopathy. CT abdomen pelvis 2/28/2020 no enhancing lesions seen within the liver to suggest metastatic disease. Wall thickening of the rectosigmoid junction. Images reviewed. Continue FOLFIRI Avastin and repeat scans in 3 months  EGD by Dr. Price Chow 03/02/2020 showing no masses or lesions. Cycle # 85 FOLFIRI + Avastin was on 03/03/2020. CEA 7.4 on 03/02/2020. Cycle # 86 FOLFIRI + Avastin was on 03/17/2020. CEA 4.5 on 03/16/2020. Colonoscopy on 03/23/2020 by Dr. Price aguero (records requested). Cycle # 87 FOLFIRI + Avastin was on 03/31/2020. CEA 4.1 on 03/30/2020. Cycle # 88 FOLFIRI + Avastin was on 04/21/2020. CEA 6.4 on 04/20/2020. Cycle # 89 FOLFIRI + Avastin was on 05/05/2020. CEA 5.8 on 05/04/2020. Cycle # 90 FOLFIRI + Avastin was on 06/02/2020. CEA 5.5 on 06/01/2020. Cycle # 91 FOLFIRI + Avastin was on 06/16/2020. CEA 5.6 on 06/15/2020. CT chest 06/23/2020 noted no metastatic disease in the chest.   CT abdomen/pelvis 06/23/2020 Stable small liver lesions measuring up to 5 mm since 2019.   22 mm round mass in segment 8 of the liver with central high density stable from December 2019), previously measuring up to 34 mm in 2017. No additional metastatic disease in the abdomen or pelvis. Small amount of ascites. Overall stable disease. Continue FOLFIRI + Avastin and repeat scans in 2-3 months. Cycle # 92 FOLFIRI + Avastin was on 07/07/2020.  CEA 5.7 on 07/06/2020. Cycle # 93 FOLFIRI + Avastin was on 07/21/2020. CEA 5.9 on 07/20/2020. Cycle # 94 FOLFIRI + Avastin was on 08/04/2020. CEA 5.5 on 08/04/2020. Cycle # 95 FOLFIRI + Avastin was on 08/25/2020. CEA 6.1 on 08/24/2020. CT chest 9/2/2020 stable unremarkable enhanced CT of the thorax. There is no metastatic disease to the lungs. CT abdomen pelvis on 9/2/2020 stable 2.2 cm low attenuated lesion within the central aspect of the right hepatic lobe favored to represent treated metastatic disease. No new hepatic lesion is identified. Stable splenomegaly  There is no appreciable colonic lesion or stricture. Pericholecystic fluid of uncertain etiology. Laparoscopic cholecystectomy with normal cholangiogram 10/27/20  Gallbladder: Chronic cholecystitis and cholelithiasis.  Unremarkable regional lymph node. 11/13/2020; Seen by Dr. Rodney Cortes from General surgery team; cleared to re-start chemotherapy. Cycle # 96 FOLFIRI + Avastin was on 11/17/2020. CEA 3.6 on 11/16/2020. Cycle # 97 FOLFIRI + Avastin was on 12/01/2020. CEA 3.5 on 11/30/2020. Cycle # 98 FOLFIRI + Avastin was on 12/15/2020. CEA 4.3 on 12/15/2020. Cycle # 99 FOLFIRI + Avastin was on 01/05/2021. CEA 4.7 on 01/04/2021. CT chest 01/13/2021 negative for metastatic disease. CT abdomen/pelvis 01/13/2021 Unchanged central hepatic lesion. No specific signs of abdominopelvic metastatic disease. Continue FOLFIRI + Avastin and repeat scans in 3 months. Cycle # 100 FOLFIRI + Avastin was on 01/19/2021. CEA 4.7 on 01/18/2021. Cycle # 101 FOLFIRI + Avastin was on 02/02/2021. CEA 5.2 on 02/01/2021. Cycle # 102 FOLFIRI + Avastin was on 02/16/2021. CEA 5.6 on 02/15/2021. Cycle # 103 FOLFIRI + Avastin was on 03/02/2021. Cycle # 104 FOLFIRI (20% dose reduced due to significant toxicity) + Avastin was on 03/16/2021. Cycle # 105 FOLFIRI (same dose as cycle # 104) + Avastin was on 03/30/2021. CEA 6.5 on 03/29/2021.   Cycle # 106 FOLFIRI (same dose as cycle # 104) + Avastin was on 04/13/2021. CEA 6.0 on 04/12/2021  CT chest 04/20/2021 no evidence of metastatic disease. CT abdomen/pelvis 04/20/2021 No evidence of progressive metastatic disease. Stable 2 cm lesion in the right lobe of the liver, likely representing treated metastatic disease. Imaging reviewed. Continue FOLFIRI + Avastin and repeat scans in 3 months  Cycle # 107 FOLFIRI + Avastin is today 04/27/2021. Labs reviewed, ok to proceed.      RTC 2 weeks for Cycle #108 FOLFIRI + Avastin  MSI testing noted no mismatch repair protein loss of expression    4/27/2021  Juan José Marks MD  Board Certified Medical Oncologist

## 2021-04-27 NOTE — PROGRESS NOTES
Patient presents to clinic today for Xgeva injection. Patient's labs monitored, specifically, Calcium level, to ensure no increased risk of hypocalcemia with administration of the medication. Patient's calcium level is 8.4 mg/dL. Patient received therapy and will continue to be monitored prior to each dose.     Maylin Roca, [id number] 4/27/2021 9:21 AM

## 2021-04-29 ENCOUNTER — HOSPITAL ENCOUNTER (OUTPATIENT)
Dept: INFUSION THERAPY | Age: 71
Discharge: HOME | End: 2021-04-29
Payer: MEDICARE

## 2021-04-29 DIAGNOSIS — C78.7 RECTAL CANCER METASTASIZED TO LIVER (HCC): ICD-10-CM

## 2021-04-29 DIAGNOSIS — C20 RECTAL CANCER METASTASIZED TO LIVER (HCC): ICD-10-CM

## 2021-04-29 DIAGNOSIS — Z51.11 ENCOUNTER FOR ANTINEOPLASTIC CHEMOTHERAPY: Primary | ICD-10-CM

## 2021-04-29 PROCEDURE — 96372 THER/PROPH/DIAG INJ SC/IM: CPT

## 2021-04-29 PROCEDURE — 6360000002 HC RX W HCPCS: Performed by: INTERNAL MEDICINE

## 2021-04-29 RX ADMIN — PEGFILGRASTIM-CBQV 6 MG: 6 INJECTION, SOLUTION SUBCUTANEOUS at 14:25

## 2021-05-10 ENCOUNTER — HOSPITAL ENCOUNTER (OUTPATIENT)
Age: 71
Discharge: HOME | End: 2021-05-10
Payer: MEDICARE

## 2021-05-10 DIAGNOSIS — C20 RECTAL CANCER METASTASIZED TO LIVER (HCC): ICD-10-CM

## 2021-05-10 DIAGNOSIS — C78.7 RECTAL CANCER METASTASIZED TO LIVER (HCC): ICD-10-CM

## 2021-05-10 DIAGNOSIS — C79.51 BONE METASTASIS (HCC): ICD-10-CM

## 2021-05-10 LAB
ALBUMIN SERPL-MCNC: 3.7 G/DL (ref 3.5–5.2)
ALP BLD-CCNC: 192 U/L (ref 40–129)
ALT SERPL-CCNC: 11 U/L (ref 0–40)
ANION GAP SERPL CALCULATED.3IONS-SCNC: 13 MMOL/L (ref 7–16)
ANISOCYTOSIS: ABNORMAL
AST SERPL-CCNC: 18 U/L (ref 0–39)
BASOPHILS ABSOLUTE: 0.03 E9/L (ref 0–0.2)
BASOPHILS RELATIVE PERCENT: 0.9 % (ref 0–2)
BILIRUB SERPL-MCNC: 0.7 MG/DL (ref 0–1.2)
BUN BLDV-MCNC: 17 MG/DL (ref 6–23)
CALCIUM SERPL-MCNC: 9.2 MG/DL (ref 8.6–10.2)
CEA: 6.4 NG/ML (ref 0–5.2)
CHLORIDE BLD-SCNC: 105 MMOL/L (ref 98–107)
CO2: 23 MMOL/L (ref 22–29)
CREAT SERPL-MCNC: 1.2 MG/DL (ref 0.7–1.2)
EOSINOPHILS ABSOLUTE: 0.09 E9/L (ref 0.05–0.5)
EOSINOPHILS RELATIVE PERCENT: 2.6 % (ref 0–6)
GFR AFRICAN AMERICAN: >60
GFR NON-AFRICAN AMERICAN: 60 ML/MIN/1.73
GLUCOSE BLD-MCNC: 124 MG/DL (ref 74–99)
HCT VFR BLD CALC: 28 % (ref 37–54)
HEMOGLOBIN: 8.3 G/DL (ref 12.5–16.5)
HYPOCHROMIA: ABNORMAL
LYMPHOCYTES ABSOLUTE: 0.5 E9/L (ref 1.5–4)
LYMPHOCYTES RELATIVE PERCENT: 14.8 % (ref 20–42)
MCH RBC QN AUTO: 30.7 PG (ref 26–35)
MCHC RBC AUTO-ENTMCNC: 29.6 % (ref 32–34.5)
MCV RBC AUTO: 103.7 FL (ref 80–99.9)
MONOCYTES ABSOLUTE: 0.13 E9/L (ref 0.1–0.95)
MONOCYTES RELATIVE PERCENT: 3.5 % (ref 2–12)
MYELOCYTE PERCENT: 0.9 % (ref 0–0)
NEUTROPHILS ABSOLUTE: 2.57 E9/L (ref 1.8–7.3)
NEUTROPHILS RELATIVE PERCENT: 77.4 % (ref 43–80)
OVALOCYTES: ABNORMAL
PDW BLD-RTO: 18.9 FL (ref 11.5–15)
PLATELET # BLD: 139 E9/L (ref 130–450)
PMV BLD AUTO: 12.1 FL (ref 7–12)
POIKILOCYTES: ABNORMAL
POLYCHROMASIA: ABNORMAL
POTASSIUM SERPL-SCNC: 4.1 MMOL/L (ref 3.5–5)
RBC # BLD: 2.7 E12/L (ref 3.8–5.8)
SODIUM BLD-SCNC: 141 MMOL/L (ref 132–146)
TEAR DROP CELLS: ABNORMAL
TOTAL PROTEIN: 6.2 G/DL (ref 6.4–8.3)
WBC # BLD: 3.3 E9/L (ref 4.5–11.5)

## 2021-05-10 PROCEDURE — 80053 COMPREHEN METABOLIC PANEL: CPT

## 2021-05-10 PROCEDURE — 36415 COLL VENOUS BLD VENIPUNCTURE: CPT

## 2021-05-10 PROCEDURE — 82378 CARCINOEMBRYONIC ANTIGEN: CPT

## 2021-05-10 PROCEDURE — 85025 COMPLETE CBC W/AUTO DIFF WBC: CPT

## 2021-05-11 ENCOUNTER — HOSPITAL ENCOUNTER (OUTPATIENT)
Dept: INFUSION THERAPY | Age: 71
Discharge: HOME | End: 2021-05-11
Payer: MEDICARE

## 2021-05-11 ENCOUNTER — OFFICE VISIT (OUTPATIENT)
Dept: ONCOLOGY | Age: 71
End: 2021-05-11
Payer: MEDICARE

## 2021-05-11 VITALS
RESPIRATION RATE: 18 BRPM | BODY MASS INDEX: 23.56 KG/M2 | TEMPERATURE: 98.2 F | DIASTOLIC BLOOD PRESSURE: 60 MMHG | SYSTOLIC BLOOD PRESSURE: 121 MMHG | WEIGHT: 177.8 LBS | HEIGHT: 73 IN | OXYGEN SATURATION: 100 % | HEART RATE: 73 BPM

## 2021-05-11 VITALS — RESPIRATION RATE: 18 BRPM | HEART RATE: 65 BPM | SYSTOLIC BLOOD PRESSURE: 112 MMHG | DIASTOLIC BLOOD PRESSURE: 64 MMHG

## 2021-05-11 DIAGNOSIS — C20 RECTAL CANCER METASTASIZED TO LIVER (HCC): ICD-10-CM

## 2021-05-11 DIAGNOSIS — C20 RECTAL CANCER METASTASIZED TO LIVER (HCC): Primary | ICD-10-CM

## 2021-05-11 DIAGNOSIS — C20 RECTAL CANCER (HCC): Primary | ICD-10-CM

## 2021-05-11 DIAGNOSIS — C78.7 RECTAL CANCER METASTASIZED TO LIVER (HCC): Primary | ICD-10-CM

## 2021-05-11 DIAGNOSIS — C78.7 RECTAL CANCER METASTASIZED TO LIVER (HCC): ICD-10-CM

## 2021-05-11 DIAGNOSIS — Z51.11 ENCOUNTER FOR ANTINEOPLASTIC CHEMOTHERAPY: ICD-10-CM

## 2021-05-11 PROCEDURE — 2580000003 HC RX 258: Performed by: INTERNAL MEDICINE

## 2021-05-11 PROCEDURE — 96366 THER/PROPH/DIAG IV INF ADDON: CPT

## 2021-05-11 PROCEDURE — 96368 THER/DIAG CONCURRENT INF: CPT

## 2021-05-11 PROCEDURE — 96417 CHEMO IV INFUS EACH ADDL SEQ: CPT

## 2021-05-11 PROCEDURE — 6360000002 HC RX W HCPCS: Performed by: INTERNAL MEDICINE

## 2021-05-11 PROCEDURE — 96411 CHEMO IV PUSH ADDL DRUG: CPT

## 2021-05-11 PROCEDURE — 96375 TX/PRO/DX INJ NEW DRUG ADDON: CPT

## 2021-05-11 PROCEDURE — 96413 CHEMO IV INFUSION 1 HR: CPT

## 2021-05-11 PROCEDURE — 96415 CHEMO IV INFUSION ADDL HR: CPT

## 2021-05-11 PROCEDURE — 99214 OFFICE O/P EST MOD 30 MIN: CPT | Performed by: INTERNAL MEDICINE

## 2021-05-11 RX ORDER — FLUOROURACIL 50 MG/ML
650 INJECTION, SOLUTION INTRAVENOUS ONCE
Status: COMPLETED | OUTPATIENT
Start: 2021-05-11 | End: 2021-05-11

## 2021-05-11 RX ORDER — PALONOSETRON HYDROCHLORIDE 0.05 MG/ML
0.25 INJECTION, SOLUTION INTRAVENOUS ONCE
Status: CANCELLED | OUTPATIENT
Start: 2021-05-11

## 2021-05-11 RX ORDER — DIPHENHYDRAMINE HYDROCHLORIDE 50 MG/ML
50 INJECTION INTRAMUSCULAR; INTRAVENOUS ONCE
Status: CANCELLED | OUTPATIENT
Start: 2021-05-11 | End: 2021-05-11

## 2021-05-11 RX ORDER — HEPARIN SODIUM (PORCINE) LOCK FLUSH IV SOLN 100 UNIT/ML 100 UNIT/ML
500 SOLUTION INTRAVENOUS PRN
Status: DISCONTINUED | OUTPATIENT
Start: 2021-05-11 | End: 2021-05-12 | Stop reason: HOSPADM

## 2021-05-11 RX ORDER — SODIUM CHLORIDE 9 MG/ML
INJECTION, SOLUTION INTRAVENOUS CONTINUOUS
Status: CANCELLED | OUTPATIENT
Start: 2021-05-11

## 2021-05-11 RX ORDER — HEPARIN SODIUM (PORCINE) LOCK FLUSH IV SOLN 100 UNIT/ML 100 UNIT/ML
500 SOLUTION INTRAVENOUS PRN
Status: CANCELLED | OUTPATIENT
Start: 2021-05-11

## 2021-05-11 RX ORDER — ATROPINE SULFATE 0.4 MG/ML
0.4 AMPUL (ML) INJECTION ONCE
Status: CANCELLED | OUTPATIENT
Start: 2021-05-11 | End: 2021-05-11

## 2021-05-11 RX ORDER — SODIUM CHLORIDE 0.9 % (FLUSH) 0.9 %
10 SYRINGE (ML) INJECTION PRN
Status: CANCELLED | OUTPATIENT
Start: 2021-05-11

## 2021-05-11 RX ORDER — SODIUM CHLORIDE 9 MG/ML
250 INJECTION, SOLUTION INTRAVENOUS CONTINUOUS
Status: DISCONTINUED | OUTPATIENT
Start: 2021-05-11 | End: 2021-05-12 | Stop reason: HOSPADM

## 2021-05-11 RX ORDER — ATROPINE SULFATE 0.4 MG/ML
0.4 AMPUL (ML) INJECTION ONCE
Status: COMPLETED | OUTPATIENT
Start: 2021-05-11 | End: 2021-05-11

## 2021-05-11 RX ORDER — EPINEPHRINE 1 MG/ML
0.3 INJECTION, SOLUTION, CONCENTRATE INTRAVENOUS PRN
Status: CANCELLED | OUTPATIENT
Start: 2021-05-11

## 2021-05-11 RX ORDER — METHYLPREDNISOLONE SODIUM SUCCINATE 125 MG/2ML
125 INJECTION, POWDER, LYOPHILIZED, FOR SOLUTION INTRAMUSCULAR; INTRAVENOUS ONCE
Status: CANCELLED | OUTPATIENT
Start: 2021-05-11 | End: 2021-05-11

## 2021-05-11 RX ORDER — PALONOSETRON HYDROCHLORIDE 0.05 MG/ML
0.25 INJECTION, SOLUTION INTRAVENOUS ONCE
Status: COMPLETED | OUTPATIENT
Start: 2021-05-11 | End: 2021-05-11

## 2021-05-11 RX ORDER — SODIUM CHLORIDE 0.9 % (FLUSH) 0.9 %
10 SYRINGE (ML) INJECTION PRN
Status: DISCONTINUED | OUTPATIENT
Start: 2021-05-11 | End: 2021-05-12 | Stop reason: HOSPADM

## 2021-05-11 RX ORDER — 0.9 % SODIUM CHLORIDE 0.9 %
10 VIAL (ML) INJECTION ONCE
Status: CANCELLED | OUTPATIENT
Start: 2021-05-11 | End: 2021-05-11

## 2021-05-11 RX ORDER — DEXAMETHASONE SODIUM PHOSPHATE 10 MG/ML
10 INJECTION INTRAMUSCULAR; INTRAVENOUS ONCE
Status: COMPLETED | OUTPATIENT
Start: 2021-05-11 | End: 2021-05-11

## 2021-05-11 RX ORDER — DEXAMETHASONE SODIUM PHOSPHATE 10 MG/ML
10 INJECTION, SOLUTION INTRAMUSCULAR; INTRAVENOUS ONCE
Status: CANCELLED | OUTPATIENT
Start: 2021-05-11

## 2021-05-11 RX ORDER — SODIUM CHLORIDE 9 MG/ML
250 INJECTION, SOLUTION INTRAVENOUS CONTINUOUS
Status: CANCELLED | OUTPATIENT
Start: 2021-05-11 | End: 2021-05-29

## 2021-05-11 RX ORDER — FLUOROURACIL 50 MG/ML
650 INJECTION, SOLUTION INTRAVENOUS ONCE
Status: CANCELLED | OUTPATIENT
Start: 2021-05-11

## 2021-05-11 RX ADMIN — SODIUM CHLORIDE, PRESERVATIVE FREE 10 ML: 5 INJECTION INTRAVENOUS at 12:18

## 2021-05-11 RX ADMIN — PALONOSETRON 0.25 MG: 0.25 INJECTION, SOLUTION INTRAVENOUS at 09:14

## 2021-05-11 RX ADMIN — LEUCOVORIN CALCIUM 650 MG: 10 INJECTION INTRAMUSCULAR; INTRAVENOUS at 10:20

## 2021-05-11 RX ADMIN — SODIUM CHLORIDE 400 MG: 9 INJECTION, SOLUTION INTRAVENOUS at 09:33

## 2021-05-11 RX ADMIN — DEXAMETHASONE SODIUM PHOSPHATE 10 MG: 10 INJECTION INTRAMUSCULAR; INTRAVENOUS at 09:15

## 2021-05-11 RX ADMIN — SODIUM CHLORIDE 250 ML: 9 INJECTION, SOLUTION INTRAVENOUS at 09:14

## 2021-05-11 RX ADMIN — Medication 500 UNITS: at 12:18

## 2021-05-11 RX ADMIN — FLUOROURACIL 650 MG: 50 INJECTION, SOLUTION INTRAVENOUS at 12:06

## 2021-05-11 RX ADMIN — SODIUM CHLORIDE 320 MG: 9 INJECTION, SOLUTION INTRAVENOUS at 10:21

## 2021-05-11 RX ADMIN — ATROPINE SULFATE 0.4 MG: 0.4 INJECTION, SOLUTION INTRAMUSCULAR; INTRAVENOUS; SUBCUTANEOUS at 10:15

## 2021-05-11 NOTE — PROGRESS NOTES
[place] [id number]  Attending Clinic Note     Reason for Visit: Follow-up on a patient with Metastatic Rectal Cancer.     PCP: Elizabeth Real MD     History of Present Illness:  65 y/o  male who was referred to see Dr. Agatha Gillis (GI team) for evaluation of bright red blood per rectum, mild anemia and change in bowel habits with diarrhea. CEA 2640 on 10/19/2015. AlcP 299 AST 57 ALT 75 on 10/19/2015. Colonoscopy in 2013 noted no significant polyps, colitis or lesions at that time. Denies any Family History of colorectal cancer or polyps.     Colonoscopy on 10/19/2015 revealed:  1. Ascending polyp, 8 mm, hot snare: Tubulovillous adenoma. 2. Transverse polyp, 1 cm, hot snare: Serrated polyp most consistent with sessile serrated adenoma. 3. Five splenic flexure polyps, three - 5 mm, 7 mm, 8 mm, hot snare and biopsy: Four segments of Tubular Adenoma  4. Descending polyp, 5 mm, biopsy: Tubular adenoma. 5. Sigmoid polyp, 4 mm biopsy, Serrated polyp most consistent with sessile serrated adenoma. 6. Two rectal polyps, 4 mm biopsy: Serrated polyp most consistent with hyperplastic polyp. 7. Rectosigmoid colon mass (large mass approximately 65% circumference of the lumen; Very friable, firm and hard): Tubulovillous adenoma with associated focal erosion and fibroplasia.      CT scan abdomen/pelvis on 10/26/2015:  1. Small nodules at lung bases likely represent metastatic colon   cancer. 2. Extensive likely metastatic colon cancer throughout the liver.      3. Mild mural thickening and luminal narrowing in the   terminal ileum, otherwise nonspecific.      Colonoscopy with snare removal rectal mass was performed by Dr. Nasreen Alvarez. Pathology proved:  Rectal polyp: Invasive adenocarcinoma involving villous adenoma and extending to the cauterized edge of excision. KRAS Mutation: Mutation detected.   BRAF Mutation: Mutation not detected. 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     CEA 3488. Bone scan on 11/10/2015 noted no metastatic disease. CT chest on 11/10/2015 revealed Multiple pulmonary nodules in the upper and lower lobes consistent with metastatic disease;   Hepatic metastasis also visualized. 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 Mediport was placed. Cycle # 1 of FOLFOX + Avastin was on 11/23/2015. CEA was 4555 on 11/23/2015. Cycle # 2 of FOLFOX + Avastin was on 12/08/2015. CEA was 3160 on 12/08/2015. Cycle # 3 of FOLFOX + Avastin was on 12/22/2015. CEA was 2516 on 12/21/2015. Cycle # 4 of FOLFOX + Avastin was on 01/05/2016. CEA was 2098 on 01/05/2015. Cycle # 5 of FOLFOX + Avastin was on 01/19/2016. CEA was 1511 on 01/05/2015.     -Bone scan on 01/26/2016 noted no metastatic disease. CT chest on 01/26/2016 revealed Significant response to treatment with no visible residual nodules. CT scan abdomen/pelvis on 01/26/2016 noted Interval decreased size of multiple masses in the liver compatible with treatment response. Continue another 2 months of FOLFOX + Avastin and repeat scans. Cycle # 6 of FOLFOX + Avastin was on 02/02/2016.  on 02/02/2016. Cycle # 7 of FOLFOX + Avastin was on 02/16/2016.  on 02/16/2016. Cycle # 8 of FOLFOX + Avastin was on 03/01/2016.  on 03/01/2016. Cycle # 9 of FOLFOX + Avastin was on 03/15/2016.  on 03/15/2016. Cycle # 10 of FOLFOX + Avastin was on 03/29/2016. .4 on 03/29/2016. Cycle # 11 of FOLFOX + Avastin was on 04/12/2016. .4 on 04/12/2016.     Re-staging scans on 04/19/2016: CT Chest: clear lungs; no evidence of recurring pulmonary nodule; CT Abdomen/Pelvis: Further interval decrease in size of the multiple metastatic hepatic lesions, the largest lesion now measures 3.9 x 3.5 cm and previously measured 4.5 cm in maximum diameter.  No mesenteric lymphadenopathy is identified; Bone Scan: No evidence of osseous metastasis. Continue same regimen and re-stage in 2-3 months. Cycle # 12 FOLFOX + Avastin was on 04/26/2016. .5 on 04/26/2016. Cycle # 13 FOLFOX + Avastin was on 05/10/2016. .3 on 05/10/2016. Cycle # 14 FOLFOX+AVASTIN was on 05/24/2016. .5 on 05/24/2016. Cycle # 15 FOLFOX + Avastin was on 06/07/2016. CEA 90.5 on 06/07/2016. Admitted to [facility] 06/13/2016-06/16/2016 for abdominal pain: EGD noted 1.5 cm clean based duodenal bulb ulceration s/p epinephrine and bicap per Dr. Alejo Hoffman. No active bleeding. A. Stomach, biopsy: Mild chronic gastritis, immunostain negative for Helicobacter  B. Esophagus, biopsy: Gastric glandular mucosa with prominent intestinal metaplasia ([place]'s epithelium), negative for epithelial dysplasia, esophageal squamous mucosa not identified  Cycle # 16 FOLFOX (discontinued avastin (bevacizumab) given association of peptic ulcer disease and known association of GI perforation) was on 06/21/2016. Cycle # 17 FOLFOX was on 07/05/2016. CEA 52.6 on 07/19/2016. Cycle # 17 FOLFOX was on 07/05/2016. CEA 52.6 on 07/05/2016. CEA 47.6 on 07/19/2016.     Re-staging scans 07/19/2106: CT Chest negative for metastatic disease. CT Abdomen/Pelvis: Stable hepatic lesions; Question new mural thickening in the cecum. Bone Scan: New Let hip lesion suspicious for bone metastasis.     Increased CEA; new mural thickening in the cecum and new left hip lesion suspicious for bone metastasis are consistent with disease progression; He derived maximum benefit from FOLFOX/AVASTIN. D/C FOLFOX/AVASTIN. We recommended FOLFIRI second line therapy. Cycle # 1 FOLFIRI was on 08/02/2016. CEA 40.8 on 08/02/2016. Xgeva q4 weeks started on 08/02/2016. Cycle # 2 FOLFIRI was on 08/16/2016. CEA 31.7 on 08/16/2016. Cycle # 3 FOLFIRI (added Avastin) was on 08/30/2016 given that ulcers healed on EGD 08/15/2016 by Dr. Reinier Myers; Protonix bid since avastin re-started.    Colonoscopy on 08/29/2016 (to look at the cecal area) unremarkable. CEA 26.7 on 08/30/2016. Cycle # 4 FOLFIRI/Avastin was on 09/13/2016. CEA 23.4 on 09/13/2016. Cycle # 5 FOLFIRI/Avastin was on 09/27/2016. CEA 22.3 on 09/27/2016. Cycle # 6 FOLFIRI/Avastin was on 10/11/2016. CEA 18.4 on 10/11/2016.      Bone scan 10/21/2016 stable bone metastasis; ? New lesion anterior left sixth rib likely interval healing fracture. CT abdomen/pelvis revealed stable hepatic metastasis. CT chest negative for metastatic disease. Continue FOLFIRI/Avastin and repeat scans in 3 months. Cycle # 7 FOLFIRI/Avastin was on 10/25/2016. CEA 16.1  Cycle # 8 FOLFIRI/Avastin was on 11/08/2016. CEA 15.7  Cycle # 9 FOLFIRI/Avastin was on 11/29/2016. CEA 13.6 on 11/29/2016. Cycle # 10 FOLFIRI/Avastin was on 12/13/2016. CEA 10.6 on 12/13/2016. Cycle # 11 FOLFIRI/Avastin was on 12/27/2016. CEA 11.1 on 12/27/2016. Cycle # 12 FOLFIRI/Avastin was on 01/10/2017. CEA 9.7 on 01/10/2017.       CT chest 01/23/2017 No new pulmonary nodule or lymphadenopathy. Patchy groundglass opacities within the left lower lobe, suggestive of infectious or inflammatory etiology. Followup CT chest in 3 months. Slight increased linear sclerosis along the left anterior sixth rib corresponding to an area of increased uptake on the prior bone scan from 10/21/2016, favored to be related to interval healing changes of a nondisplaced fracture. CT abdomen/pelvis on 01/23/2017 Redemonstration of multiple hepatic metastases, which are similar compared to the prior CT from 10/21/2016. Redemonstration of area of increased sclerosis along the right acetabulum suspicious for metastatic disease. Bone scan on 01/23/2017 Stable subtle uptake within the left proximal diaphysis, again suggestive of metastatic disease  Decreased subtle uptake within the medial right acetabulum.    Decreased uptake within the left anterior sixth rib corresponding to linear sclerosis on the recent CT, favored to be related to an joint rib fracture. Continue FOLFIRI + Avastin and repeat scans in 3 months. Cycle # 13 FOLFIRI + Avastin was on 01/24/2017. Cycle # 14 FOLFIRI + Avastin was on 02/07/2017. Cycle # 15 FOLFIRI + Avastin was on 02/21/2017. Cycle # 16 FOLFIRI + Avastin was on 03/07/2017. Cycle # 17 FOLFIRI + Avastin was on 03/21/2017. Cycle # 18 FOLFIRI + Avastin was on 04/04/2017. CT chest 04/17/2017 negative for metastatic disease. CT abdomen/pelvis 04/17/2017 Stable hypodense metastatic lesions within the liver. Stable area of increased sclerosis along the right acetabulum  Bone scan 04/17/2017 Stable subtle uptake within the left proximal femoral diaphysis; No definite abnormal uptake in the region of a sclerotic lesion along the posterior column of the right acetabulum noted on CT. Stable slight uptake within the left anterior sixth rib corresponding to linear sclerosis on the recent CT, favored to be related to a fracture. Continue FOLFIRI + Avastin and repeat scans in 3 months. Cycle # 19 FOLFIRI + Avastin was on 04/18/2017. Cycle # 20 FOLFIRI + Avastin was on 05/02/2017. Cycle # 21 FOLFIRI + Avastin was on 05/16/2017. Cycle # 22 FOLFIRI + Avastin was on 05/30/2017. Cycle # 23 FOLFIRI + Avastin was on 06/13/2017. Cycle # 24 FOLFIRI + Avastin was on 06/27/2017. Cycle # 25 FOLFIRI + Avastin was on 07/11/2017. Cycle # 26 FOLFIRI + Avastin was on 07/25/2017. Cycle # 27 FOLFIRI + Avastin was on 08/08/2017. Cycle # 28 FOLFIRI + Avastin was on 08/22/2017. Cycle # 29 FOLFIRI + Avastin was on 09/05/2017. Cycle # 30 FOLFIRI + Avastin was on 09/19/2017. Bone scan 09/26/2017 stable. CT abdomen/pelvis on 09/26/2017 Stable hypodense mass in the liver. Stable sclerotic lesion in the acetabulum. CT chest 09/26/2017 negative for metastatic disease. Cycle # 31 FOLFIRI + Avastin was on 10/03/2017. CEA 7.4 on 10/03/2017. Cycle # 32 FOLFIRI + Avastin was on 10/17/2017.  CEA 6.0 on 10/17/2017. Cycle # 33 FOLFIRI + Avastin was on 10/31/2017. CEA 6.8 on 10/31/2017. Cycle # 34 FOLFIRI + Avastin was on 11/14/2017. CEA 7.2 on 11/14/2017. Cycle # 35 FOLFIRI + Avastin was on 11/28/2017. CEA 5.1 on 11/28/2017. Cycle # 36 FOLFIRI + Avastin was on 12/12/2017. CEA 6.1 on 12/12/2017. Bone scan 12/22/2017 noted no evidence of metastatic disease to the axial or appendicular skeleton. CT chest 12/22/2017 noted no evidence of metastatic disease. CT abdomen/pelvis 12/22/2017 noted stable hypodense lesions in the liver. Continue FOLFIRI + Avastin and repeat scans in 3 months. Cycle # 37 FOLFIRI + Avastin was on 12/27/2018. Cycle # 38 FOLFIRI + Avastin was on 01/09/2018. Cycle # 39 FOLFIRI + Avastin was on 01/23/2018. Cycle # 40 FOLFIRI + Avastin was on 02/06/2018. Cycle # 41 FOLFIRI + Avastin was on 02/20/2018. Cycle # 42 FOLFIRI + Avastin was on 03/06/2018. Cycle # 43 FOLFIRI + Avastin was on 03/20/2018. Bone scan on 03/26/2018 negative for metastatic disease. CT chest 03/26/2018 noted ? new 7 mm nodule in LUCY. CT abdomen/pelvis 03/26/2018 noted stable hypodense lesions in the liver. ? New small ascites in the abdomen. Patient wants to continue to monitor the lung finding and repeat scans in 2 months instead of changing the current regimen into oral Lonsurf. Cycle # 44 FOLFIRI + Avastin was on 04/03/2018. CEA 6.3 on 04/03/2018. Cycle # 45 FOLFIRI + Avastin was on 04/24/2018. CEA 5.3 on 04/24/2018. Cycle # 46 FOLFIRI + Avastin was on 05/08/2018. CEA 5.4 on 05/08/2018. Cycle # 47 FOLFIRI + Avastin was on 05/22/2018. CEA 6.1 on 05/22/2018. CT chest 05/31/2018 noted stable nodule in LUCY. No evidence of worsening malignancy. CT abdomen/pelvis on 05/31/2018 noted stable liver metastasis. No evidence of worsening malignancy. Continue FOLFIRI + Avastin and repeat scans in 3 months. Cycle # 48 FOLFIRI + Avastin was on 06/06/2018. Cycle # 49 FOLFIRI + Avastin was on 06/19/2018. Cycle # 50 FOLFIRI + Avastin was on 07/03/2018. Cycle # 51 FOLFIRI + Avastin was on 07/24/2018. Cycle # 52 FOLFIRI + Avastin was on 08/14/2018. Cycle # 53 FOLFIRI + Avastin was on 08/28/2018. CT chest 09/06/2018 noted interval decrease in size of LUCY measuring up to 4 mm, suggestive of treatment response. No mediastinal or hilar LN  CT abdomen/pelvis 09/06/2018 Slight interval increase in size of a previously noted metastatic lesion within the right hepatic lobe. This may be related to differences in phase of enhancement compared to the most recent study from 5/31/2018, the lesion remains decreased in size compared to the more previous studies. No abdominal, retroperitoneal, or pelvic lymphadenopathy. Continue FOLFIRI + Avastin and repeat scans in 2 months. Cycle # 54 FOLFIRI + Avastin was on 09/11/2018. Cycle # 55 FOLFIRI + Avastin was on 09/25/2018. Cycle # 56 FOLFIRI + Avastin was on 10/09/2018. Cycle # 57 FOLFIRI + Avastin was on 10/23/2018. CT chest 11/02/2018 stable 3 mm nodule within LUCY. No new right and left lung nodule. No mediastinal or osseous lesion. CT abdomen/pelvis 11/02/2018 stable metastatic disease to liver. No new metastatic disease identified. No mesenteric or retroperitoneal LN. No gross colonic lesion identified. Continue FOLFIRI + Avastin and repeat scans in 3 months. Cycle # 58 FOLFIRI + Avastin was on 11/06/2018. CEA 7.6 on 11/05/2018. Cycle # 59 FOLFIRI + Avastin was on 11/27/2018. CEA 6.9 on 11/26/2018. Cycle # 60 FOLFIRI + Avastin was on 12/11/2018. CEA 6.7 on 12/11/2018. Cycle # 61 FOLFIRI + Avastin was on 01/08/2019. CEA 5.6 on 01/07/2019. Cycle # 62 FOLFIRI + Avastin was on 01/29/2019. CEA 4.6 on 01/28/2019. Cycle # 63 FOLFIRI + Avastin was on 02/12/2019. CEA 4.3 on 02/11/2019. CT chest 02/21/2019 no evidence of metastatic disease. CT abdomen/pelvis 02/21/2019 stable hypodense liver lesions. No evidence of worsening metastatic disease.  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 Ordered MRI thoracic spine and referred to neurosurgery team.    Cycle # 64 FOLFIRI + Avastin was on 02/26/2019. CEA 4.7 on 02/25/2019. Cycle # 65 FOLFIRI + Avastin was on 03/12/2019. CEA 4.0 on 03/11/2019. Cycle # 66 FOLFIRI + Avastin was on 03/26/2019. CEA 4.7 on 03/25/2019. Cycle # 67 FOLFIRI + Avastin was on 04/09/2019. CEA 5.6 on 04/08/2019. Cycle # 68 FOLFIRI + Avastin was on 04/30/2019. CEA 4.9 on 04/29/2019. Cycle # 69 FOLFIRI + Avastin was on 05/14/2019. CEA 5.3 on 05/14/2019. CT chest 05/23/2019 stable small lung nodule with no evidence of metastatic disease. CT abdomen/pelvis 05/23/2019 Decrease conspicuity of the liver lesions. No new lesions seen. Stable splenomegaly. Continue FOLFIRI + Avastin and repeat scans in 3 months. Cycle # 70 FOLFIRI + Avastin was on 06/04/2019. CEA 4.8 on 06/03/2019. Cycle # 71 FOLFIRI + Avastin was on 06/18/2019. CEA 4.6 on 06/17/2019. Cycle # 72 FOLFIRI + Avastin was on 07/09/2019. CEA 4.3 on 07/08/2019. Cycle # 73 FOLFIRI + Avastin was on 07/30/2019. CEA 5.0 on 07/29/2019. Cycle # 74 FOLFIRI + Avastin was on 08/13/2019. CEA 5.0 on 08/12/2019. CT chest 08/20/2019 negative  CT abdomen/pelvis 08/20/2019 Previous identified hepatic lesions are not visualized on the current exam.  Continue FOLFIRI + Avastin and repeat scans in 3 months. Cycle # 75 FOLFIRI + Avastin was on 08/27/2019. CEA 5.0 on 08/26/2019. Cycle # 76 FOLFIRI + Avastin was on 09/17/2019. CEA 5.5 on 09/16/2019. Cycle # 77 FOLFIRI + Avastin was on 10/15/2019. CEA 4.6 on 10/15/2019. Cycle # 78 FOLFIRI + Avastin was on 10/29/2019. CEA 4.1 on 10/28/2019. Cycle # 79 FOLFIRI + Avastin was on 11/12/2019. CEA 4.3 on 11/12/2019. Cycle # 80 FOLFIRI + Avastin was on 12/10/2019. CEA 4.0 on 12/09/2019.   CT chest 12/19/2019 Stable 3 mm nodule within the left upper lobe, similar to the previous studies dating back to 11/2/2018, however decreased in size compared to the CT from 3/26/2018. No thoracic LN. CT abdomen/pelvis 12/19/2019 Previously described small hypodense lesions are more conspicuous on the current study compared to the recent study throughout the liver from 8/20/2019, however similar to the prior study from 2/21/2019. Findings may be related to differences in contrast opacification. No abdominal or pelvic lymphadenopathy. Continue FOLFIRI + Avastin and repeat scans in 2 months to f/u on liver lesions. Cycle # 81 FOLFIRI + Avastin was on 01/07/2020. CEA 3.8 on 01/06/2020. Cycle # 82 FOLFIRI + Avastin was on 01/21/2020. CEA 3.7 on 01/20/2020. Cycle # 83 FOLFIRI + Avastin was on 02/04/2020. CEA 4.1 on 02/03/2020. Cycle # 84 FOLFIRI + Avastin was on 02/18/2020. CEA 4.6 on 02/17/2020. EGD by Dr. Letty Muhammad 03/02/2020 showing no masses or lesions  Cycle # 85 FOLFIRI + Avastin was on 03/03/2020. CEA 7.4 on 03/02/2020. Cycle # 86 FOLFIRI + Avastin was on 03/17/2020. CEA 4.5 on 03/16/2020. Colonoscopy on 03/23/2020 by Dr. Letty Muhammad unremarkable (records requested). Cycle # 87 FOLFIRI + Avastin was on 03/31/2020. CEA 4.1 on 03/30/2020. Cycle # 88 FOLFIRI + Avastin was on 04/21/2020. CEA 6.4 on 04/20/2020. Cycle # 89 FOLFIRI + Avastin was on 05/05/2020. CEA 5.8 on 05/04/2020. Cycle # 90 FOLFIRI + Avastin was on 06/02/2020. CEA 5.5 on 06/01/2020. Cycle # 91 FOLFIRI + Avastin was on 06/16/2020. CEA 5.6 on 06/15/2020. CT chest 06/23/2020 noted no metastatic disease in the chest.   CT abdomen/pelvis 06/23/2020 Stable small liver lesions measuring up to 5 mm since 2019.   22 mm round mass in segment 8 of the liver with central high density stable from December 2019), previously measuring up to 34 mm in 2017. No additional metastatic disease in the abdomen or pelvis. Small amount of ascites. Overall stable disease. Continue FOLFIRI + Avastin and repeat scans in 2-3 months. Cycle # 92 FOLFIRI + Avastin was on 07/07/2020.  CEA 5.7 on 07/06/2020. Cycle # 93 FOLFIRI + Avastin was on 07/21/2020. CEA 5.9 on 07/20/2020. Cycle # 94 FOLFIRI + Avastin was on 08/04/2020. CEA 5.5 on 08/04/2020. Cycle # 95 FOLFIRI + Avastin was on 08/25/2020. CEA 6.1 on 08/24/2020. CT chest 9/2/2020 stable unremarkable enhanced CT of the thorax. There is no metastatic disease to the lungs. CT abdomen pelvis on 9/2/2020 stable 2.2 cm low attenuated lesion within the central aspect of the right hepatic lobe favored to represent treated metastatic disease. No new hepatic lesion is identified. Stable splenomegaly  There is no appreciable colonic lesion or stricture  Pericholecystic fluid of uncertain etiology. General surgery team consulted. Laparoscopic cholecystectomy with normal cholangiogram 10/27/20  Gallbladder: Chronic cholecystitis and cholelithiasis.  Unremarkable regional lymph node. 11/13/2020; Seen by Dr. Rodney Cortes from General surgery team; cleared to re-start chemotherapy. Cycle # 96 FOLFIRI + Avastin was on 11/17/2020. CEA 3.6 on 11/16/2020. Cycle # 97 FOLFIRI + Avastin was on 12/01/2020. CEA 3.5 on 11/30/2020. Cycle # 98 FOLFIRI + Avastin was on 12/15/2020. CEA 4.3 on 12/15/2020. Cycle # 99 FOLFIRI + Avastin was on 01/05/2021. CEA 4.7 on 01/04/2021. CT chest 01/13/2021 negative for metastatic disease. CT abdomen/pelvis 01/13/2021 Unchanged central hepatic lesion. No specific signs of abdominopelvic metastatic disease. Continue FOLFIRI + Avastin and repeat scans in 3 months. Cycle # 100 FOLFIRI + Avastin was on 01/19/2021. CEA 4.7 on 01/18/2021. Cycle # 101 FOLFIRI + Avastin was on 02/02/2021. CEA 5.2 on 02/01/2021. Cycle # 102 FOLFIRI + Avastin was on 02/16/2021. CEA 5.6 on 02/15/2021. Cycle # 103 FOLFIRI + Avastin was on 03/02/2021. Cycle # 104 FOLFIRI (20% dose reduced due to significant toxicity) + Avastin was on 03/16/2021. Cycle # 105 FOLFIRI (same dose as cycle # 104) + Avastin was on 03/30/2021.  CEA 6.5 on 03/29/2021. Cycle # 106 FOLFIRI (same dose as cycle # 104) + Avastin was on 04/13/2021. CEA 6.0 on 04/12/2021  CT chest 04/20/2021 no evidence of metastatic disease. CT abdomen/pelvis 04/20/2021 No evidence of progressive metastatic disease. Stable 2 cm lesion in the right lobe of the liver, likely representing treated metastatic disease. Imaging reviewed. Continue FOLFIRI + Avastin and repeat scans in 3 months  Cycle # 107 FOLFIRI + Avastin was on 04/27/2021  Cycle # 108 FOLFIRI + Avastin is today 05/11/2021  Today 05/11/2021 for f/u. No fever, chills. improved fatigue. Felt better with chemo dose reduction. Hernia wants to see Dr. Knight for possible repair    Review of Systems;  CONSTITUTIONAL: No fever, chills. Fair appetite. improved fatigue. ENMT: Eyes: No diplopia; Nose: No epistaxis. Mouth:No lesions  RESPIRATORY: No hemoptysis, shortness of breath. CARDIOVASCULAR: No chest pain, palpitations. GASTROINTESTINAL:No N/V. Hernia   GENITOURINARY: No dysuria, urinary frequency, hematuria. NEURO: No syncope, presyncope, headache. Remainder ROS: Negative. Past Medical History:   Past Medical History               Diagnosis Date    Arthritis      Cancer ([facility] [address]         colon    Depression      Hyperlipidemia      Hypertension           Medications:  Reviewed and reconciled.     Allergies: Allergies   Allergen Reactions    Neosporin [Neomycin-Polymyxin-Gramicidin] Rash    Tape Blase Fide Tape] Rash      Physical Exam:  /60   Pulse 73   Temp 98.2 °F (36.8 °C)   Resp 18   Ht 6' 1\" (1.854 m)   Wt 177 lb 12.8 oz (80.6 kg)   SpO2 100%   BMI 23.46 kg/m²   GENERAL: Alert, oriented x 3, not in distress  HEENT: PERRLA, EOMI. NECK: Supple. Without lymphadenopathy. LUNGS: Lungs are CTA bilaterally, with no wheezing, crackles or rhonchi. CARDIOVASCULAR: Regular rhythm. No murmurs, rubs or gallops. ABDOMEN: Soft. Non-tender, non-distended. Positive bowel sounds.    EXTREMITIES: Without clubbing, cyanosis or edema. Skin toxicity in palms  NEUROLOGIC: No focal deficits. ECOG PS 1    Diagnostics:  Lab Results   Component Value Date    WBC 3.3 (L) 05/10/2021    HGB 8.3 (L) 05/10/2021    HCT 28.0 (L) 05/10/2021    .7 (H) 05/10/2021     05/10/2021     Lab Results   Component Value Date     05/10/2021    K 4.1 05/10/2021     05/10/2021    CO2 23 05/10/2021    BUN 17 05/10/2021    CREATININE 1.2 05/10/2021    GLUCOSE 124 (H) 05/10/2021    CALCIUM 9.2 05/10/2021    PROT 6.2 (L) 05/10/2021    LABALBU 3.7 05/10/2021    BILITOT 0.7 05/10/2021    ALKPHOS 192 (H) 05/10/2021    AST 18 05/10/2021    ALT 11 05/10/2021    LABGLOM 60 05/10/2021    GFRAA >60 05/10/2021     Lab Results   Component Value Date    CEA 6.4 (H) 05/10/2021     Impression/Plan:  67 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 KRAS Mutation: Mutation detected. BRAF Mutation: Mutation not detected. 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 CT scan abdomen/pelvis on 10/26/2015 revealed small nodules at the lung bases, extensive liver lesions consistent with metastatic rectosigmoid cancer. CEA 3488 on 10/30/2015. Bone scan on 11/10/2015 noted no metastatic disease. CT chest on 11/10/2015 revealed Multiple pulmonary nodules in the upper and lower lobes consistent with metastatic disease; Hepatic metastasis also visualized. 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 Mediport was placed. Cycle # 1 of FOLFOX + Avastin was on 11/23/2015. CEA was 4555 on 11/23/2015. Cycle # 2 of FOLFOX + Avastin was on 12/08/2015. CEA was 3160 on 12/08/2015. Cycle # 3 of FOLFOX + Avastin was on 12/22/2015. CEA was 2516 on 12/21/2015. Cycle # 4 of FOLFOX + Avastin was on 01/05/2016. CEA was 2098 on 01/05/2015. Cycle # 5 of FOLFOX + Avastin was on 01/19/2016. CEA was 1511 on 01/05/2015.     -Bone scan on 01/26/2016 noted no metastatic disease. CT chest on 01/26/2016 revealed Significant response to treatment with no visible residual nodules. CT scan abdomen/pelvis on 01/26/2016 noted Interval decreased size of multiple masses in the liver compatible with treatment response. Continue another 2 months of FOLFOX + Avastin and repeat scans. Cycle # 6 of FOLFOX + Avastin was on 02/02/2016.  on 02/02/2016. Cycle # 7 of FOLFOX + Avastin was on 02/16/2016.  on 02/16/2016. Cycle # 8 of FOLFOX + Avastin was on 03/01/2016.  on 03/01/2016. Cycle # 9 of FOLFOX + Avastin was on 03/15/2016.  on 03/15/2016. Cycle # 10 of FOLFOX + Avastin was on 03/29/2016. .4 on 03/29/2016. Cycle # 11 of FOLFOX + Avastin was on 04/12/2016. .4 on 04/12/2016.     Re-staging scans on 04/19/2016: CT Chest: clear lungs; no evidence of recurring pulmonary nodule; CT Abdomen/Pelvis: Further interval decrease in size of the multiple metastatic hepatic lesions, the largest lesion now measures 3.9 x 3.5 cm and previously measured 4.5 cm in maximum diameter. No mesenteric lymphadenopathy is identified; Bone Scan: No evidence of osseous metastasis. Continue same regimen and re-stage in 2-3 months. Cycle # 12 FOLFOX + Avastin was on 04/26/2016. .5 on 04/26/2016. Cycle # 13 FOLFOX + Avastin was on 05/10/2016. .3 on 05/10/2016. Cycle # 14 FOLFOX+AVASTIN was on 05/24/2016. .5 on 05/24/2016. Cycle # 15 FOLFOX + Avastin was on 06/07/2016. CEA 90.5 on 06/07/2016. Admitted to [facility] 06/13/2016-06/16/2016 for abdominal pain: EGD noted 1.5 cm clean based duodenal bulb ulceration s/p epinephrine and bicap per Dr. Lynette Atkins. No active bleeding. A. Stomach, biopsy: Mild chronic gastritis, immunostain negative for Helicobacter  B.  Esophagus, biopsy: Gastric glandular mucosa with prominent ntestinal metaplasia (Mad[place]'s epithelium), negative for epithelial dysplasia, esophageal squamous mucosa not identified     Cycle # 16 FOLFOX was on 12/27/2016. CEA 11.1 on 12/27/2016. Cycle # 12 FOLFIRI/Avastin was on 01/10/2017. CEA 9.7 on 01/10/2017. CT chest 01/23/2017 No new pulmonary nodule or lymphadenopathy. Patchy groundglass opacities within the left lower lobe, suggestive of infectious or inflammatory etiology. Followup CT chest in 3 months. Slight increased linear sclerosis along the left anterior sixth rib corresponding to an area of increased uptake on the prior bone scan from 10/21/2016, favored to be related to interval healing changes of a nondisplaced fracture. CT abdomen/pelvis on 01/23/2017 Redemonstration of multiple hepatic metastases, which are similar compared to the prior CT from 10/21/2016. Redemonstration of area of increased sclerosis along the right acetabulum suspicious for metastatic disease. Bone scan on 01/23/2017 Stable subtle uptake within the left proximal diaphysis, again suggestive of metastatic disease  Decreased subtle uptake within the medial right acetabulum. Decreased uptake within the left anterior sixth rib corresponding to linear sclerosis on the recent CT, favored to be related to an joint rib fracture. Continue FOLFIRI + Avastin and repeat scans in 3 months. Cycle # 13 FOLFIRI + Avastin was on 01/24/2017. Cycle # 14 FOLFIRI + Avastin was on 02/07/2017. Cycle # 15 FOLFIRI + Avastin was on 02/21/2017. Cycle # 16 FOLFIRI + Avastin was on 03/07/2017. Cycle # 17 FOLFIRI + Avastin was on 03/21/2017. Cycle # 18 FOLFIRI + Avastin was on 04/04/2017. CT chest 04/17/2017 negative for metastatic disease. CT abdomen/pelvis 04/17/2017 Stable hypodense metastatic lesions within the liver. Stable area of increased sclerosis along the right acetabulum  Bone scan 04/17/2017 Stable subtle uptake within the left proximal femoral diaphysis; No definite abnormal uptake in the region of a sclerotic lesion along the posterior column of the right acetabulum noted on CT.    Stable slight uptake within the left anterior sixth rib corresponding to linear sclerosis on the recent CT, favored to be related to a fracture. Continue FOLFIRI + Avastin and repeat scans in 3 months. Cycle # 19 FOLFIRI + Avastin was on 04/18/2017. CEA 7.5 on 04/18/2017. Cycle # 20 FOLFIRI + Avastin was on 05/02/2017. CEA 7.7 on 05/02/2017. Cycle # 21 FOLFIRI + Avastin was on 05/16/2017. CEA 7.1 on 05/16/2017. Cycle # 22 FOLFIRI + Avastin was on 05/30/2017. CEA 8.2 on 05/30/2017. Cycle # 23 FOLFIRI + Avastin was on 06/13/2017. CEA 7.7 on 06/13/2017. Cycle # 24 FOLFIRI + Avastin was on 06/27/2017. CEA 6.6 on 06/27/2017. Re-staging scans 07/05/2017:  Bone scan stable proximal left femoral diaphysis, right acetabulum, and left anterior sixth rib lesions;  CT abdomen/pelvis stable hypodense metastatic lesions within the liver; CT chest without convincing evidence of metastatic disease. Cycle # 25 FOLFIRI + Avastin was on 07/11/2017. CEA 6.3 on 07/11/2017. Cycle # 26 FOLFIRI + Avastin was on 07/25/2017. CEA 7.3 on 07/25/2017. Cycle # 27 FOLFIRI + Avastin was on 08/08/2017. CEA 6.5 on 08/08/2017. Cycle # 28 FOLFIRI + Avastin was on 08/22/2017. CEA 5.9 on 08/22/2017. Cycle # 29 FOLFIRI + Avastin was on 09/05/2017. CEA 6.3 on 09/05/2017. Cycle # 30 FOLFIRI + Avastin was on 09/19/2017. CEA 6.3 on 09/19/2017. Bone scan 09/26/2017 stable. CT abdomen/pelvis on 09/26/2017 Stable hypodense mass in the liver. Stable sclerotic lesion in the acetabulum. CT chest 09/26/2017 negative for metastatic disease. Cycle # 31 FOLFIRI + Avastin was on 10/03/2017. CEA 7.4 on 10/03/2017. Cycle # 32 FOLFIRI + Avastin was on 10/17/2017. CEA 6.0 on 10/17/2017. Cycle # 33 FOLFIRI + Avastin was on 10/31/2017. CEA 6.8 on 10/31/2017. Cycle # 34 FOLFIRI + Avastin was on 11/14/2017. CEA 7.2 on 11/14/2017. Cycle # 35 FOLFIRI + Avastin was on 11/28/2017. CEA 5.1 on 11/28/2017. Cycle # 36 FOLFIRI + Avastin was on 12/12/2017. CEA 6.1 on 12/12/2017. FOLFIRI + Avastin was on 07/24/2018. CEA 6.7 on 07/24/2018. Cycle # 52 FOLFIRI + Avastin was on 08/14/2018. CEA 6.8 on 08/14/2018. Cycle # 53 FOLFIRI + Avastin was on 08/28/2018. CEA 6.5 on 08/27/2018. CT chest 09/06/2018 noted interval decrease in size of LUCY measuring up to 4 mm, suggestive of treatment response. No mediastinal or hilar LN  CT abdomen/pelvis 09/06/2018 Slight interval increase in size of a previously noted metastatic lesion within the right hepatic lobe. This may be related to differences in phase of enhancement compared to the most recent study from 5/31/2018, the lesion remains decreased in size compared to the more previous studies. No abdominal, retroperitoneal, or pelvic lymphadenopathy. Continue FOLFIRI + Avastin and repeat scans in 2 months. Cycle # 54 FOLFIRI + Avastin was on 09/11/2018. CEA 6.4 on 09/10/2018. Cycle # 55 FOLFIRI + Avastin was on 09/25/2018. CEA 6.4 on 09/25/2018. Cycle # 56 FOLFIRI + Avastin was on 10/09/2018. CEA 6.7 on 10/08/2018. Cycle # 57 FOLFIRI + Avastin was on 10/23/2018. CEA 7.2 on 10/22/2018. CT chest 11/02/2018 stable 3 mm nodule within LUCY. No new right and left lung nodule. No mediastinal or osseous lesion. CT abdomen/pelvis 11/02/2018 stable metastatic disease to liver. No new metastatic disease identified. No mesenteric or retroperitoneal LN. No gross colonic lesion identified. Continue FOLFIRI + Avastin and repeat scans in 3 months. Cycle # 58 FOLFIRI + Avastin was on 11/06/2018. CEA 7.6 on 11/05/2018. Cycle # 59 FOLFIRI + Avastin was on 11/27/2018. CEA 6.9 on 11/26/2018. Cycle # 60 FOLFIRI + Avastin was on 12/11/2018. CEA 6.7 on 12/11/2018. Cycle # 61 FOLFIRI + Avastin was on 01/08/2019. CEA 5.6 on 01/07/2019. Cycle # 62 FOLFIRI + Avastin was on 01/29/2019. CEA 4.6 on 01/28/2019. Cycle # 63 FOLFIRI + Avastin was on 02/12/2019. CEA 4.3 on 02/11/2019. CT chest 02/21/2019 no evidence of metastatic disease.   CT abdomen/pelvis 02/21/2019 stable hypodense liver lesions. No evidence of worsening metastatic disease. 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 Ordered MRI thoracic spine and referred to neurosurgery team.  Cycle # 64 FOLFIRI + Avastin was on 02/26/2019. CEA 4.7 on 02/25/2019. Cycle # 65 FOLFIRI + Avastin was on 03/12/2019. CEA 4.0 on 03/11/2019. Cycle # 66 FOLFIRI + Avastin was on 03/26/2019. CEA 4.7 on 03/25/2019. Cycle # 67 FOLFIRI + Avastin was on 04/09/2019. CEA 5.6 on 04/08/2019. Cycle # 68 FOLFIRI + Avastin was on 04/30/2019. CEA 4.9 on 04/29/2019. Cycle # 69 FOLFIRI + Avastin was on 05/14/2019. CEA 5.3 on 05/14/2019. CT chest 05/23/2019 stable small lung nodule with no evidence of metastatic disease. CT abdomen/pelvis 05/23/2019 Decrease conspicuity of the liver lesions. No new lesions seen. Stable splenomegaly. Continue FOLFIRI + Avastin and repeat scans in 3 months. Cycle # 70 FOLFIRI + Avastin was on 06/04/2019. CEA 4.8 on 06/03/2019. Cycle # 71 FOLFIRI + Avastin was on 06/18/2019. CEA 4.6 on 06/17/2019. Cycle # 72 FOLFIRI + Avastin was on 07/09/2019. CEA 4.3 on 07/08/2019. Cycle # 73 FOLFIRI + Avastin was on 07/30/2019. CEA 5.0 on 07/29/2019. Cycle # 74 FOLFIRI + Avastin was on 08/13/2019. CEA 5.0 on 08/12/2019. CT chest 08/20/2019 negative  CT abdomen/pelvis 08/20/2019 Previous identified hepatic lesions are not visualized on the current exam.  Continue FOLFIRI + Avastin and repeat scans in 3 months. Cycle # 75 FOLFIRI + Avastin was on 08/27/2019. CEA 5.0 on 08/26/2019. Cycle # 76 FOLFIRI + Avastin was on 09/17/2019. CEA 5.5 on 09/16/2019. Cycle # 77 FOLFIRI + Avastin was on 10/15/2019. CEA 4.6 on 10/15/2019. Cycle # 78 FOLFIRI + Avastin was on 10/29/2019. CEA 4.1 on 10/28/2019. Cycle # 79 FOLFIRI + Avastin was on 11/12/2019. CEA 4.3 on 11/12/2019. Cycle # 80 FOLFIRI + Avastin was on 12/10/2019. CEA 4.0 on 12/09/2019.   CT chest 12/19/2019 Stable 3 mm nodule within the left upper lobe, similar to the previous studies dating back to 11/2/2018, however decreased in size compared to the CT from 3/26/2018. No thoracic LN. CT abdomen/pelvis 12/19/2019 Previously described small hypodense lesions are more conspicuous on the current study compared to the recent study throughout the liver from 8/20/2019, however similar to the prior study from 2/21/2019. Findings may be related to differences in contrast opacification. No abdominal or pelvic lymphadenopathy. Continue FOLFIRI + Avastin and repeat scans in 2 months to f/u on liver lesions. Cycle # 81 FOLFIRI + Avastin was on 01/07/2020. CEA 3.8 on 01/06/2020. Cycle # 82 FOLFIRI + Avastin was on 01/21/2020. CEA 3.7 on 01/20/2020. Cycle # 83 FOLFIRI + Avastin was on 02/04/2020. CEA 4.1 on 02/03/2020. Cycle # 84 FOLFIRI + Avastin was on 02/18/2020. CEA 4.6 on 02/17/2020. CT chest 2/28/2020 no evidence of metastatic disease to the lungs and no mediastinal or hilar adenopathy. CT abdomen pelvis 2/28/2020 no enhancing lesions seen within the liver to suggest metastatic disease. Wall thickening of the rectosigmoid junction. Images reviewed. Continue FOLFIRI Avastin and repeat scans in 3 months  EGD by Dr. Tiny Chan 03/02/2020 showing no masses or lesions. Cycle # 85 FOLFIRI + Avastin was on 03/03/2020. CEA 7.4 on 03/02/2020. Cycle # 86 FOLFIRI + Avastin was on 03/17/2020. CEA 4.5 on 03/16/2020. Colonoscopy on 03/23/2020 by Dr. Tiny Chan unremarkable (records requested). Cycle # 87 FOLFIRI + Avastin was on 03/31/2020. CEA 4.1 on 03/30/2020. Cycle # 88 FOLFIRI + Avastin was on 04/21/2020. CEA 6.4 on 04/20/2020. Cycle # 89 FOLFIRI + Avastin was on 05/05/2020. CEA 5.8 on 05/04/2020. Cycle # 90 FOLFIRI + Avastin was on 06/02/2020. CEA 5.5 on 06/01/2020. Cycle # 91 FOLFIRI + Avastin was on 06/16/2020. CEA 5.6 on 06/15/2020.   CT chest 06/23/2020 noted no metastatic disease in the chest.   CT abdomen/pelvis 06/23/2020 Stable small liver lesions measuring up to 5 mm since 2019.   22 mm round mass in segment 8 of the liver with central high density stable from December 2019), previously measuring up to 34 mm in 2017. No additional metastatic disease in the abdomen or pelvis. Small amount of ascites. Overall stable disease. Continue FOLFIRI + Avastin and repeat scans in 2-3 months. Cycle # 92 FOLFIRI + Avastin was on 07/07/2020. CEA 5.7 on 07/06/2020. Cycle # 93 FOLFIRI + Avastin was on 07/21/2020. CEA 5.9 on 07/20/2020. Cycle # 94 FOLFIRI + Avastin was on 08/04/2020. CEA 5.5 on 08/04/2020. Cycle # 95 FOLFIRI + Avastin was on 08/25/2020. CEA 6.1 on 08/24/2020. CT chest 9/2/2020 stable unremarkable enhanced CT of the thorax. There is no metastatic disease to the lungs. CT abdomen pelvis on 9/2/2020 stable 2.2 cm low attenuated lesion within the central aspect of the right hepatic lobe favored to represent treated metastatic disease. No new hepatic lesion is identified. Stable splenomegaly  There is no appreciable colonic lesion or stricture. Pericholecystic fluid of uncertain etiology. Laparoscopic cholecystectomy with normal cholangiogram 10/27/20  Gallbladder: Chronic cholecystitis and cholelithiasis.  Unremarkable regional lymph node. 11/13/2020; Seen by Dr. Augustus Givens from General surgery team; cleared to re-start chemotherapy. Cycle # 96 FOLFIRI + Avastin was on 11/17/2020. CEA 3.6 on 11/16/2020. Cycle # 97 FOLFIRI + Avastin was on 12/01/2020. CEA 3.5 on 11/30/2020. Cycle # 98 FOLFIRI + Avastin was on 12/15/2020. CEA 4.3 on 12/15/2020. Cycle # 99 FOLFIRI + Avastin was on 01/05/2021. CEA 4.7 on 01/04/2021. CT chest 01/13/2021 negative for metastatic disease. CT abdomen/pelvis 01/13/2021 Unchanged central hepatic lesion. No specific signs of abdominopelvic metastatic disease. Continue FOLFIRI + Avastin and repeat scans in 3 months. Cycle # 100 FOLFIRI + Avastin was on 01/19/2021.  CEA 4.7 on

## 2021-05-12 ENCOUNTER — TELEPHONE (OUTPATIENT)
Dept: SURGERY | Age: 71
End: 2021-05-12

## 2021-05-12 NOTE — TELEPHONE ENCOUNTER
Patient called 05/11/2021 to ask about his cardiologist. I told him that I would check into it and call him back. Patients Cardiologist is Dr. Neelima Cobb.      Electronically signed by Alexa Tipton on 5/12/2021 at 3:46 PM

## 2021-05-13 ENCOUNTER — HOSPITAL ENCOUNTER (OUTPATIENT)
Dept: INFUSION THERAPY | Age: 71
Discharge: HOME | End: 2021-05-13
Payer: MEDICARE

## 2021-05-13 DIAGNOSIS — Z51.11 ENCOUNTER FOR ANTINEOPLASTIC CHEMOTHERAPY: Primary | ICD-10-CM

## 2021-05-13 DIAGNOSIS — C78.7 RECTAL CANCER METASTASIZED TO LIVER (HCC): ICD-10-CM

## 2021-05-13 DIAGNOSIS — C20 RECTAL CANCER METASTASIZED TO LIVER (HCC): ICD-10-CM

## 2021-05-13 PROCEDURE — 6360000002 HC RX W HCPCS: Performed by: INTERNAL MEDICINE

## 2021-05-13 PROCEDURE — 96372 THER/PROPH/DIAG INJ SC/IM: CPT

## 2021-05-13 RX ADMIN — PEGFILGRASTIM-CBQV 6 MG: 6 INJECTION, SOLUTION SUBCUTANEOUS at 14:37

## 2021-05-21 ENCOUNTER — TELEPHONE (OUTPATIENT)
Dept: SURGERY | Age: 71
End: 2021-05-21

## 2021-05-21 ENCOUNTER — OFFICE VISIT (OUTPATIENT)
Dept: SURGERY | Age: 71
End: 2021-05-21
Payer: MEDICARE

## 2021-05-21 VITALS
WEIGHT: 178 LBS | SYSTOLIC BLOOD PRESSURE: 83 MMHG | DIASTOLIC BLOOD PRESSURE: 50 MMHG | HEIGHT: 73 IN | HEART RATE: 67 BPM | TEMPERATURE: 98.9 F | BODY MASS INDEX: 23.59 KG/M2

## 2021-05-21 DIAGNOSIS — K40.90 RIGHT INGUINAL HERNIA: Primary | ICD-10-CM

## 2021-05-21 PROCEDURE — 99214 OFFICE O/P EST MOD 30 MIN: CPT | Performed by: SURGERY

## 2021-05-21 RX ORDER — SODIUM CHLORIDE 9 MG/ML
INJECTION, SOLUTION INTRAVENOUS CONTINUOUS
Status: CANCELLED | OUTPATIENT
Start: 2021-05-21

## 2021-05-21 NOTE — PROGRESS NOTES
Sandra Chawla  5/21/2021  [address]               History and Physical    Sandra Chawla is a 66 y.o., male, with a large right inguinal/scrotal hernia which is now painful and he wants it repaired. I did a lap victoria 10/2020 and he couldn't urinate and had to go home with a catheter. Now he is on Flomax twice a day. Weight= 178 lb (80.7 kg) Body mass index is 23.48 kg/m². Past Medical History:   Diagnosis Date    Arthritis     Cancer ([address]     colon    Depression     History of blood transfusion     [facility] [place]    Hyperlipidemia     Hypertension        Past Surgical History:   Procedure Laterality Date    CHOLECYSTECTOMY, LAPAROSCOPIC N/A 10/27/2020    CHOLECYSTECTOMY LAPAROSCOPIC WITH IOC, ROBOT XI ASSISTED, POSS OPEN performed by Vijaya Andrade MD [address]  11/2/15    bx, rectal mass    FRACTURE SURGERY      KNEE ARTHROSCOPY Right     LIVER BIOPSY  11/12/15    SKIN FULL GRAFT      on finger    TONSILLECTOMY Bilateral     TUNNELED CENTRAL VENOUS CATHETER W/ SUBCUTANEOUS PORT Left        Home Medications  Prior to Visit Medications    Medication Sig Taking?  Authorizing Provider   Magic Mouthwash (MIRACLE MOUTHWASH) Swish and swallow 15 mLs 4 times daily as needed for Irritation Equal Amts of Benadryl,Maalox, and Xylocaine Yes RUBEN Sagastume CNP   ferrous gluconate (FERGON) 324 (38 Fe) MG tablet Take 324 mg by mouth daily (with breakfast) Yes Historical Provider, MD   traMADol (ULTRAM) 50 MG tablet TAKE 1 TABLET BY MOUTH EVERY 4 TO 6 HOURS AS NEEDED FOR PAIN Yes Historical Provider, MD   tamsulosin (FLOMAX) 0.4 MG capsule Take 2 capsules by mouth daily Yes Joseline Brown MD   ondansetron (ZOFRAN) 4 MG tablet Take 1 tablet by mouth every 8 hours as needed for Nausea or Vomiting Yes RUBEN Sagastume CNP   lactulose (CHRONULAC) 10 GM/15ML solution Take 20 g by mouth as needed  Yes Historical Provider, MD furosemide (LASIX) 20 MG tablet Take 20 mg by mouth daily Indications: pt is taken this med 7 days a week for now  Yes Historical Provider, MD   magnesium oxide (MAG-OX) 400 (240 Mg) MG tablet Take 1 tablet by mouth 2 times daily  Patient taking differently: Take 400 mg by mouth daily  Yes Maya Clemente MD   potassium chloride (KLOR-CON M) 20 MEQ extended release tablet Take 40 mEq by mouth daily  Yes Historical Provider, MD   loratadine (CLARITIN) 10 MG tablet Take 10 mg by mouth See Admin Instructions Takes the week of chemotherapy (Last dose: 5/9; Next dose: 5/26) Yes Historical Provider, MD   pantoprazole (PROTONIX) 40 MG tablet Take 40 mg by mouth daily Yes Historical Provider, MD   b complex vitamins capsule Take 1 capsule by mouth daily Yes Historical Provider, MD   diphenoxylate-atropine (LOMOTIL) 2.5-0.025 MG per tablet Take 1 tablet by mouth 4 times daily as needed for Diarrhea. Yes Historical Provider, MD   polyethylene glycol (GLYCOLAX) packet Take 17 g by mouth daily as needed for Constipation Yes Historical Provider, MD   hydrochlorothiazide (HYDRODIURIL) 25 MG tablet Take 1 tablet by mouth daily Yes RUBEN Omalley CNP   hydrocortisone (ANUSOL-HC) 2.5 % rectal cream Use 3-4 times daily. Do not use internally. External use only. Yes RUBEN Villegas CNP   acetaminophen (TYLENOL) 500 MG tablet Take 500 mg by mouth every 6 hours as needed for Pain Yes Historical Provider, MD       Allergies: Neosporin [neomycin-polymyxin-gramicidin] and Tape [adhesive tape]   Social History:   TOBACCO:   reports that he quit smoking about 20 years ago. His smoking use included cigarettes. He has a 40.00 pack-year smoking history. He has never used smokeless tobacco.  All smokers must join the free smoking cessation program and stop smoking for 3 months before having any Bariatric surgery. ETOH:    reports no history of alcohol use.    Family History   Problem Relation Age of Onset    Cancer Mother breast cancer    Cancer Father         malignant brain tumor    Diabetes Brother      Review of Systems:  Psychiatric:  depression and anxiety  Respiratory: negative  Cardiovascular: negative  Gastrointestinal: negative  Musculoskeletal:negative  All others reviewed, negative    Physical Exam:   VITALS: Blood pressure (!) 83/50, pulse 67, temperature 98.9 °F (37.2 °C), temperature source Temporal, height 6' 1\" (1.854 m), weight 178 lb (80.7 kg). General appearance: alert, appears stated age and cooperative, does ambulate easily  Head: Normocephalic, without obvious abnormality, atraumatic  Eyes: PERRL  Ears/mouth/throat:  Ears clear, mouth normal, throat no redness  Neck: no adenopathy, no JVD, supple, symmetrical, trachea midline and thyroid not enlarged  Lungs: clear to auscultation bilaterally  Heart: regular rate and rhythm  Abdomen: large right inguinal/scrotal hernia  Extremities: extremities normal, atraumatic, no cyanosis or edema  Skin: no open wounds    Assessment:  Large right inguinal/scrotal hernia  Had to go home with a álvarez after the last surgery. Plan:  We discussed the risk of surgery including wound or mesh infections, recurrent hernias and the risks of general anesthetic including MI, CVA, sudden death or reactions to anesthetic medications. The patient understands the risks. All questions were answered to the patient's satisfaction and they freely signed the consent for a Laparoscopic/robotic inguinal hernia repair with mesh, possible open. Stay on a high protein, low calorie diet, drink plenty of water. Clean out the bowels the night before surgery. He will go home with Neurontin 300 mg tid for three days, Acetaminophen 500 mg qid for 3 days, Oxycodone 5 mg q 8 hours prn for breakthrough pain. Overnight stay.   Open right Inguinal Hernia Repair with mesh    Physician Signature: Electronically signed by Dr. Rehan Zuniga MD    Send copy to Tata Limon MD

## 2021-05-21 NOTE — TELEPHONE ENCOUNTER
Per Dr. Reshma Eaton, patient is scheduled for R Inguinal Hernia Repair Open at [address] [place] [address] on 21. Surgery scheduling form faxed with confirmation, surgeon's calendar updated. Dr. Reshma Eaton to enter orders. Follow up appointment scheduled. No bowel prep instructions provided & discussed. Will check if pre-cert is required. Prior Authorization Form:      DEMOGRAPHICS:                     Patient Name:  Mortimer Lucy  Patient :  1950            Insurance:  Payor: Adore Ocasio / Plan: Royden Crigler PPO / Product Type: Medicare /   Insurance ID Number:    Payor/Plan Subscr  Sex Relation Sub. Ins. ID Effective Group Num   1.  Adore Ocasio* Waverly Grime 1950 Male Self [id number] 1/1/15 [id number]                                    [id number]         DIAGNOSIS & PROCEDURE:                       Procedure/Operation:  R inguinal hernia repair open with mesh          CPT Code: 71681    Diagnosis:  R Inguinal Hernia    ICD10 Code: K40.90    [facility]:  [facility]    Surgeon:  Dr. Ines Orellana INFORMATION:                          Date: 21    Time: TBD              Anesthesia:                                                         Status:  Outpatient        Special Comments:         Electronically signed by Adryan Lemons on 2021 at 11:32 AM

## 2021-05-24 ENCOUNTER — HOSPITAL ENCOUNTER (OUTPATIENT)
Age: 71
Discharge: HOME | End: 2021-05-24
Payer: MEDICARE

## 2021-05-24 DIAGNOSIS — C78.7 RECTAL CANCER METASTASIZED TO LIVER (HCC): ICD-10-CM

## 2021-05-24 DIAGNOSIS — C79.51 BONE METASTASIS (HCC): ICD-10-CM

## 2021-05-24 DIAGNOSIS — C20 RECTAL CANCER METASTASIZED TO LIVER (HCC): ICD-10-CM

## 2021-05-24 LAB
ALBUMIN SERPL-MCNC: 3.7 G/DL (ref 3.5–5.2)
ALP BLD-CCNC: 189 U/L (ref 40–129)
ALT SERPL-CCNC: 12 U/L (ref 0–40)
ANION GAP SERPL CALCULATED.3IONS-SCNC: 9 MMOL/L (ref 7–16)
ANISOCYTOSIS: ABNORMAL
AST SERPL-CCNC: 21 U/L (ref 0–39)
BASOPHILS ABSOLUTE: 0.02 E9/L (ref 0–0.2)
BASOPHILS RELATIVE PERCENT: 0.9 % (ref 0–2)
BILIRUB SERPL-MCNC: 0.8 MG/DL (ref 0–1.2)
BUN BLDV-MCNC: 16 MG/DL (ref 6–23)
CALCIUM SERPL-MCNC: 8.7 MG/DL (ref 8.6–10.2)
CEA: 6.3 NG/ML (ref 0–5.2)
CHLORIDE BLD-SCNC: 105 MMOL/L (ref 98–107)
CO2: 25 MMOL/L (ref 22–29)
CREAT SERPL-MCNC: 1.1 MG/DL (ref 0.7–1.2)
EOSINOPHILS ABSOLUTE: 0.02 E9/L (ref 0.05–0.5)
EOSINOPHILS RELATIVE PERCENT: 0.9 % (ref 0–6)
GFR AFRICAN AMERICAN: >60
GFR NON-AFRICAN AMERICAN: >60 ML/MIN/1.73
GLUCOSE BLD-MCNC: 126 MG/DL (ref 74–99)
HCT VFR BLD CALC: 25.7 % (ref 37–54)
HEMOGLOBIN: 7.9 G/DL (ref 12.5–16.5)
HYPOCHROMIA: ABNORMAL
LYMPHOCYTES ABSOLUTE: 0.42 E9/L (ref 1.5–4)
LYMPHOCYTES RELATIVE PERCENT: 15.8 % (ref 20–42)
MCH RBC QN AUTO: 31 PG (ref 26–35)
MCHC RBC AUTO-ENTMCNC: 30.7 % (ref 32–34.5)
MCV RBC AUTO: 100.8 FL (ref 80–99.9)
MONOCYTES ABSOLUTE: 0.1 E9/L (ref 0.1–0.95)
MONOCYTES RELATIVE PERCENT: 4.4 % (ref 2–12)
NEUTROPHILS ABSOLUTE: 2.03 E9/L (ref 1.8–7.3)
NEUTROPHILS RELATIVE PERCENT: 78.1 % (ref 43–80)
OVALOCYTES: ABNORMAL
PDW BLD-RTO: 18.1 FL (ref 11.5–15)
PLATELET # BLD: 110 E9/L (ref 130–450)
PMV BLD AUTO: 10 FL (ref 7–12)
POIKILOCYTES: ABNORMAL
POLYCHROMASIA: ABNORMAL
POTASSIUM SERPL-SCNC: 3.4 MMOL/L (ref 3.5–5)
RBC # BLD: 2.55 E12/L (ref 3.8–5.8)
SODIUM BLD-SCNC: 139 MMOL/L (ref 132–146)
TEAR DROP CELLS: ABNORMAL
TOTAL PROTEIN: 6 G/DL (ref 6.4–8.3)
WBC # BLD: 2.6 E9/L (ref 4.5–11.5)

## 2021-05-24 PROCEDURE — 82378 CARCINOEMBRYONIC ANTIGEN: CPT

## 2021-05-24 PROCEDURE — 36415 COLL VENOUS BLD VENIPUNCTURE: CPT

## 2021-05-24 PROCEDURE — 80053 COMPREHEN METABOLIC PANEL: CPT

## 2021-05-24 PROCEDURE — 85025 COMPLETE CBC W/AUTO DIFF WBC: CPT

## 2021-05-25 ENCOUNTER — OFFICE VISIT (OUTPATIENT)
Dept: ONCOLOGY | Age: 71
End: 2021-05-25
Payer: MEDICARE

## 2021-05-25 ENCOUNTER — HOSPITAL ENCOUNTER (OUTPATIENT)
Dept: INFUSION THERAPY | Age: 71
Discharge: HOME | End: 2021-05-25
Payer: MEDICARE

## 2021-05-25 ENCOUNTER — TELEPHONE (OUTPATIENT)
Dept: SURGERY | Age: 71
End: 2021-05-25

## 2021-05-25 VITALS — DIASTOLIC BLOOD PRESSURE: 62 MMHG | HEART RATE: 56 BPM | RESPIRATION RATE: 18 BRPM | SYSTOLIC BLOOD PRESSURE: 112 MMHG

## 2021-05-25 VITALS
OXYGEN SATURATION: 100 % | WEIGHT: 174.5 LBS | SYSTOLIC BLOOD PRESSURE: 109 MMHG | TEMPERATURE: 98 F | BODY MASS INDEX: 23.13 KG/M2 | HEART RATE: 98 BPM | RESPIRATION RATE: 18 BRPM | DIASTOLIC BLOOD PRESSURE: 63 MMHG | HEIGHT: 73 IN

## 2021-05-25 DIAGNOSIS — C20 RECTAL CANCER METASTASIZED TO LIVER (HCC): Primary | ICD-10-CM

## 2021-05-25 DIAGNOSIS — C78.7 RECTAL CANCER METASTASIZED TO LIVER (HCC): Primary | ICD-10-CM

## 2021-05-25 PROCEDURE — 2580000003 HC RX 258: Performed by: INTERNAL MEDICINE

## 2021-05-25 PROCEDURE — 96415 CHEMO IV INFUSION ADDL HR: CPT

## 2021-05-25 PROCEDURE — 96375 TX/PRO/DX INJ NEW DRUG ADDON: CPT

## 2021-05-25 PROCEDURE — 99214 OFFICE O/P EST MOD 30 MIN: CPT | Performed by: INTERNAL MEDICINE

## 2021-05-25 PROCEDURE — 6360000002 HC RX W HCPCS: Performed by: INTERNAL MEDICINE

## 2021-05-25 PROCEDURE — 96411 CHEMO IV PUSH ADDL DRUG: CPT

## 2021-05-25 PROCEDURE — 6360000002 HC RX W HCPCS

## 2021-05-25 PROCEDURE — 96413 CHEMO IV INFUSION 1 HR: CPT

## 2021-05-25 PROCEDURE — 96368 THER/DIAG CONCURRENT INF: CPT

## 2021-05-25 RX ORDER — DEXAMETHASONE SODIUM PHOSPHATE 10 MG/ML
10 INJECTION INTRAMUSCULAR; INTRAVENOUS ONCE
Status: COMPLETED | OUTPATIENT
Start: 2021-05-25 | End: 2021-05-25

## 2021-05-25 RX ORDER — EPINEPHRINE 1 MG/ML
0.3 INJECTION, SOLUTION, CONCENTRATE INTRAVENOUS PRN
Status: CANCELLED | OUTPATIENT
Start: 2021-05-25

## 2021-05-25 RX ORDER — 0.9 % SODIUM CHLORIDE 0.9 %
10 VIAL (ML) INJECTION ONCE
Status: CANCELLED | OUTPATIENT
Start: 2021-05-25 | End: 2021-05-25

## 2021-05-25 RX ORDER — DIPHENHYDRAMINE HYDROCHLORIDE 50 MG/ML
50 INJECTION INTRAMUSCULAR; INTRAVENOUS ONCE
Status: CANCELLED | OUTPATIENT
Start: 2021-05-25 | End: 2021-05-25

## 2021-05-25 RX ORDER — FLUOROURACIL 50 MG/ML
650 INJECTION, SOLUTION INTRAVENOUS ONCE
Status: CANCELLED | OUTPATIENT
Start: 2021-05-25

## 2021-05-25 RX ORDER — ATROPINE SULFATE 0.4 MG/ML
0.4 AMPUL (ML) INJECTION ONCE
Status: COMPLETED | OUTPATIENT
Start: 2021-05-25 | End: 2021-05-25

## 2021-05-25 RX ORDER — METHYLPREDNISOLONE SODIUM SUCCINATE 125 MG/2ML
125 INJECTION, POWDER, LYOPHILIZED, FOR SOLUTION INTRAMUSCULAR; INTRAVENOUS ONCE
Status: CANCELLED | OUTPATIENT
Start: 2021-05-25 | End: 2021-05-25

## 2021-05-25 RX ORDER — SODIUM CHLORIDE 9 MG/ML
INJECTION, SOLUTION INTRAVENOUS CONTINUOUS
Status: CANCELLED | OUTPATIENT
Start: 2021-05-25

## 2021-05-25 RX ORDER — SODIUM CHLORIDE 9 MG/ML
250 INJECTION, SOLUTION INTRAVENOUS CONTINUOUS
Status: DISCONTINUED | OUTPATIENT
Start: 2021-05-25 | End: 2021-05-26 | Stop reason: HOSPADM

## 2021-05-25 RX ORDER — PALONOSETRON HYDROCHLORIDE 0.05 MG/ML
INJECTION, SOLUTION INTRAVENOUS
Status: COMPLETED
Start: 2021-05-25 | End: 2021-05-25

## 2021-05-25 RX ORDER — DEXAMETHASONE SODIUM PHOSPHATE 10 MG/ML
INJECTION INTRAMUSCULAR; INTRAVENOUS
Status: COMPLETED
Start: 2021-05-25 | End: 2021-05-25

## 2021-05-25 RX ORDER — ATROPINE SULFATE 0.4 MG/ML
0.4 AMPUL (ML) INJECTION ONCE
Status: CANCELLED | OUTPATIENT
Start: 2021-05-25 | End: 2021-05-25

## 2021-05-25 RX ORDER — PALONOSETRON HYDROCHLORIDE 0.05 MG/ML
0.25 INJECTION, SOLUTION INTRAVENOUS ONCE
Status: CANCELLED | OUTPATIENT
Start: 2021-05-25

## 2021-05-25 RX ORDER — DEXAMETHASONE SODIUM PHOSPHATE 10 MG/ML
10 INJECTION, SOLUTION INTRAMUSCULAR; INTRAVENOUS ONCE
Status: CANCELLED | OUTPATIENT
Start: 2021-05-25

## 2021-05-25 RX ORDER — SODIUM CHLORIDE 9 MG/ML
250 INJECTION, SOLUTION INTRAVENOUS CONTINUOUS
Status: CANCELLED | OUTPATIENT
Start: 2021-05-25 | End: 2021-06-12

## 2021-05-25 RX ORDER — HEPARIN SODIUM (PORCINE) LOCK FLUSH IV SOLN 100 UNIT/ML 100 UNIT/ML
500 SOLUTION INTRAVENOUS PRN
Status: DISCONTINUED | OUTPATIENT
Start: 2021-05-25 | End: 2021-05-26 | Stop reason: HOSPADM

## 2021-05-25 RX ORDER — HEPARIN SODIUM (PORCINE) LOCK FLUSH IV SOLN 100 UNIT/ML 100 UNIT/ML
500 SOLUTION INTRAVENOUS PRN
Status: CANCELLED | OUTPATIENT
Start: 2021-05-25

## 2021-05-25 RX ORDER — PALONOSETRON HYDROCHLORIDE 0.05 MG/ML
0.25 INJECTION, SOLUTION INTRAVENOUS ONCE
Status: COMPLETED | OUTPATIENT
Start: 2021-05-25 | End: 2021-05-25

## 2021-05-25 RX ORDER — SODIUM CHLORIDE 0.9 % (FLUSH) 0.9 %
10 SYRINGE (ML) INJECTION PRN
Status: DISCONTINUED | OUTPATIENT
Start: 2021-05-25 | End: 2021-05-26 | Stop reason: HOSPADM

## 2021-05-25 RX ORDER — FLUOROURACIL 50 MG/ML
650 INJECTION, SOLUTION INTRAVENOUS ONCE
Status: COMPLETED | OUTPATIENT
Start: 2021-05-25 | End: 2021-05-25

## 2021-05-25 RX ORDER — ATROPINE SULFATE 0.4 MG/ML
AMPUL (ML) INJECTION
Status: COMPLETED
Start: 2021-05-25 | End: 2021-05-25

## 2021-05-25 RX ORDER — SODIUM CHLORIDE 0.9 % (FLUSH) 0.9 %
10 SYRINGE (ML) INJECTION PRN
Status: CANCELLED | OUTPATIENT
Start: 2021-05-25

## 2021-05-25 RX ADMIN — Medication 10 ML: at 07:30

## 2021-05-25 RX ADMIN — Medication 10 ML: at 11:47

## 2021-05-25 RX ADMIN — ATROPINE SULFATE 0.4 MG: 0.4 INJECTION, SOLUTION INTRAMUSCULAR; INTRAVENOUS; SUBCUTANEOUS at 09:20

## 2021-05-25 RX ADMIN — PALONOSETRON HYDROCHLORIDE 0.25 MG: 0.05 INJECTION, SOLUTION INTRAVENOUS at 08:52

## 2021-05-25 RX ADMIN — SODIUM CHLORIDE 250 ML: 9 INJECTION, SOLUTION INTRAVENOUS at 08:58

## 2021-05-25 RX ADMIN — LEUCOVORIN CALCIUM 650 MG: 10 INJECTION INTRAMUSCULAR; INTRAVENOUS at 09:47

## 2021-05-25 RX ADMIN — FLUOROURACIL 650 MG: 50 INJECTION, SOLUTION INTRAVENOUS at 11:33

## 2021-05-25 RX ADMIN — SODIUM CHLORIDE 320 MG: 9 INJECTION, SOLUTION INTRAVENOUS at 09:49

## 2021-05-25 RX ADMIN — Medication 0.4 MG: at 09:20

## 2021-05-25 RX ADMIN — PALONOSETRON 0.25 MG: 0.25 INJECTION, SOLUTION INTRAVENOUS at 08:52

## 2021-05-25 RX ADMIN — DEXAMETHASONE SODIUM PHOSPHATE 10 MG: 10 INJECTION INTRAMUSCULAR; INTRAVENOUS at 08:56

## 2021-05-25 RX ADMIN — Medication 500 UNITS: at 11:48

## 2021-05-25 NOTE — TELEPHONE ENCOUNTER
Called patient today to reschedule appt for procedure. From 06/09/2021 to 06/21/2021. Confirmed with scheduling and pre-testing.     Electronically signed by Louise Samuels on 5/25/2021 at 1:29 PM

## 2021-05-25 NOTE — PROGRESS NOTES
[place] [id number]  Attending Clinic Note     Reason for Visit: Follow-up on a patient with Metastatic Rectal Cancer.     PCP: Shawna Vitale MD     History of Present Illness:  65 y/o  male who was referred to see Dr. Zhang Grewal (GI team) for evaluation of bright red blood per rectum, mild anemia and change in bowel habits with diarrhea. CEA 2640 on 10/19/2015. AlcP 299 AST 57 ALT 75 on 10/19/2015. Colonoscopy in 2013 noted no significant polyps, colitis or lesions at that time. Denies any Family History of colorectal cancer or polyps.     Colonoscopy on 10/19/2015 revealed:  1. Ascending polyp, 8 mm, hot snare: Tubulovillous adenoma. 2. Transverse polyp, 1 cm, hot snare: Serrated polyp most consistent with sessile serrated adenoma. 3. Five splenic flexure polyps, three - 5 mm, 7 mm, 8 mm, hot snare and biopsy: Four segments of Tubular Adenoma  4. Descending polyp, 5 mm, biopsy: Tubular adenoma. 5. Sigmoid polyp, 4 mm biopsy, Serrated polyp most consistent with sessile serrated adenoma. 6. Two rectal polyps, 4 mm biopsy: Serrated polyp most consistent with hyperplastic polyp. 7. Rectosigmoid colon mass (large mass approximately 65% circumference of the lumen; Very friable, firm and hard): Tubulovillous adenoma with associated focal erosion and fibroplasia.      CT scan abdomen/pelvis on 10/26/2015:  1. Small nodules at lung bases likely represent metastatic colon   cancer. 2. Extensive likely metastatic colon cancer throughout the liver.      3. Mild mural thickening and luminal narrowing in the   terminal ileum, otherwise nonspecific.      Colonoscopy with snare removal rectal mass was performed by Dr. Kvng Dorado. Pathology proved:  Rectal polyp: Invasive adenocarcinoma involving villous adenoma and extending to the cauterized edge of excision. KRAS Mutation: Mutation detected.   BRAF Mutation: Mutation not detected. 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     CEA 3488. Bone scan on 11/10/2015 noted no metastatic disease. CT chest on 11/10/2015 revealed Multiple pulmonary nodules in the upper and lower lobes consistent with metastatic disease;   Hepatic metastasis also visualized. 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 Mediport was placed. Cycle # 1 of FOLFOX + Avastin was on 11/23/2015. CEA was 4555 on 11/23/2015. Cycle # 2 of FOLFOX + Avastin was on 12/08/2015. CEA was 3160 on 12/08/2015. Cycle # 3 of FOLFOX + Avastin was on 12/22/2015. CEA was 2516 on 12/21/2015. Cycle # 4 of FOLFOX + Avastin was on 01/05/2016. CEA was 2098 on 01/05/2015. Cycle # 5 of FOLFOX + Avastin was on 01/19/2016. CEA was 1511 on 01/05/2015.     -Bone scan on 01/26/2016 noted no metastatic disease. CT chest on 01/26/2016 revealed Significant response to treatment with no visible residual nodules. CT scan abdomen/pelvis on 01/26/2016 noted Interval decreased size of multiple masses in the liver compatible with treatment response. Continue another 2 months of FOLFOX + Avastin and repeat scans. Cycle # 6 of FOLFOX + Avastin was on 02/02/2016.  on 02/02/2016. Cycle # 7 of FOLFOX + Avastin was on 02/16/2016.  on 02/16/2016. Cycle # 8 of FOLFOX + Avastin was on 03/01/2016.  on 03/01/2016. Cycle # 9 of FOLFOX + Avastin was on 03/15/2016.  on 03/15/2016. Cycle # 10 of FOLFOX + Avastin was on 03/29/2016. .4 on 03/29/2016. Cycle # 11 of FOLFOX + Avastin was on 04/12/2016. .4 on 04/12/2016.     Re-staging scans on 04/19/2016: CT Chest: clear lungs; no evidence of recurring pulmonary nodule; CT Abdomen/Pelvis: Further interval decrease in size of the multiple metastatic hepatic lesions, the largest lesion now measures 3.9 x 3.5 cm and previously measured 4.5 cm in maximum diameter.  No mesenteric lymphadenopathy is identified; Bone Scan: No evidence of osseous metastasis. Continue same regimen and re-stage in 2-3 months. Cycle # 12 FOLFOX + Avastin was on 04/26/2016. .5 on 04/26/2016. Cycle # 13 FOLFOX + Avastin was on 05/10/2016. .3 on 05/10/2016. Cycle # 14 FOLFOX+AVASTIN was on 05/24/2016. .5 on 05/24/2016. Cycle # 15 FOLFOX + Avastin was on 06/07/2016. CEA 90.5 on 06/07/2016. Admitted to [facility] 06/13/2016-06/16/2016 for abdominal pain: EGD noted 1.5 cm clean based duodenal bulb ulceration s/p epinephrine and bicap per Dr. Lynette Atkins. No active bleeding. A. Stomach, biopsy: Mild chronic gastritis, immunostain negative for Helicobacter  B. Esophagus, biopsy: Gastric glandular mucosa with prominent intestinal metaplasia ([place]'s epithelium), negative for epithelial dysplasia, esophageal squamous mucosa not identified  Cycle # 16 FOLFOX (discontinued avastin (bevacizumab) given association of peptic ulcer disease and known association of GI perforation) was on 06/21/2016. Cycle # 17 FOLFOX was on 07/05/2016. CEA 52.6 on 07/19/2016. Cycle # 17 FOLFOX was on 07/05/2016. CEA 52.6 on 07/05/2016. CEA 47.6 on 07/19/2016.     Re-staging scans 07/19/2106: CT Chest negative for metastatic disease. CT Abdomen/Pelvis: Stable hepatic lesions; Question new mural thickening in the cecum. Bone Scan: New Let hip lesion suspicious for bone metastasis.     Increased CEA; new mural thickening in the cecum and new left hip lesion suspicious for bone metastasis are consistent with disease progression; He derived maximum benefit from FOLFOX/AVASTIN. D/C FOLFOX/AVASTIN. We recommended FOLFIRI second line therapy. Cycle # 1 FOLFIRI was on 08/02/2016. CEA 40.8 on 08/02/2016. Xgeva q4 weeks started on 08/02/2016. Cycle # 2 FOLFIRI was on 08/16/2016. CEA 31.7 on 08/16/2016. Cycle # 3 FOLFIRI (added Avastin) was on 08/30/2016 given that ulcers healed on EGD 08/15/2016 by Dr. Price Chow; Protonix bid since avastin re-started.    Colonoscopy on related to an joint rib fracture. Continue FOLFIRI + Avastin and repeat scans in 3 months. Cycle # 13 FOLFIRI + Avastin was on 01/24/2017. Cycle # 14 FOLFIRI + Avastin was on 02/07/2017. Cycle # 15 FOLFIRI + Avastin was on 02/21/2017. Cycle # 16 FOLFIRI + Avastin was on 03/07/2017. Cycle # 17 FOLFIRI + Avastin was on 03/21/2017. Cycle # 18 FOLFIRI + Avastin was on 04/04/2017. CT chest 04/17/2017 negative for metastatic disease. CT abdomen/pelvis 04/17/2017 Stable hypodense metastatic lesions within the liver. Stable area of increased sclerosis along the right acetabulum  Bone scan 04/17/2017 Stable subtle uptake within the left proximal femoral diaphysis; No definite abnormal uptake in the region of a sclerotic lesion along the posterior column of the right acetabulum noted on CT. Stable slight uptake within the left anterior sixth rib corresponding to linear sclerosis on the recent CT, favored to be related to a fracture. Continue FOLFIRI + Avastin and repeat scans in 3 months. Cycle # 19 FOLFIRI + Avastin was on 04/18/2017. Cycle # 20 FOLFIRI + Avastin was on 05/02/2017. Cycle # 21 FOLFIRI + Avastin was on 05/16/2017. Cycle # 22 FOLFIRI + Avastin was on 05/30/2017. Cycle # 23 FOLFIRI + Avastin was on 06/13/2017. Cycle # 24 FOLFIRI + Avastin was on 06/27/2017. Cycle # 25 FOLFIRI + Avastin was on 07/11/2017. Cycle # 26 FOLFIRI + Avastin was on 07/25/2017. Cycle # 27 FOLFIRI + Avastin was on 08/08/2017. Cycle # 28 FOLFIRI + Avastin was on 08/22/2017. Cycle # 29 FOLFIRI + Avastin was on 09/05/2017. Cycle # 30 FOLFIRI + Avastin was on 09/19/2017. Bone scan 09/26/2017 stable. CT abdomen/pelvis on 09/26/2017 Stable hypodense mass in the liver. Stable sclerotic lesion in the acetabulum. CT chest 09/26/2017 negative for metastatic disease. Cycle # 31 FOLFIRI + Avastin was on 10/03/2017. CEA 7.4 on 10/03/2017. Cycle # 32 FOLFIRI + Avastin was on 10/17/2017.  CEA 6.0 on Cycle # 50 FOLFIRI + Avastin was on 07/03/2018. Cycle # 51 FOLFIRI + Avastin was on 07/24/2018. Cycle # 52 FOLFIRI + Avastin was on 08/14/2018. Cycle # 53 FOLFIRI + Avastin was on 08/28/2018. CT chest 09/06/2018 noted interval decrease in size of LUCY measuring up to 4 mm, suggestive of treatment response. No mediastinal or hilar LN  CT abdomen/pelvis 09/06/2018 Slight interval increase in size of a previously noted metastatic lesion within the right hepatic lobe. This may be related to differences in phase of enhancement compared to the most recent study from 5/31/2018, the lesion remains decreased in size compared to the more previous studies. No abdominal, retroperitoneal, or pelvic lymphadenopathy. Continue FOLFIRI + Avastin and repeat scans in 2 months. Cycle # 54 FOLFIRI + Avastin was on 09/11/2018. Cycle # 55 FOLFIRI + Avastin was on 09/25/2018. Cycle # 56 FOLFIRI + Avastin was on 10/09/2018. Cycle # 57 FOLFIRI + Avastin was on 10/23/2018. CT chest 11/02/2018 stable 3 mm nodule within LUCY. No new right and left lung nodule. No mediastinal or osseous lesion. CT abdomen/pelvis 11/02/2018 stable metastatic disease to liver. No new metastatic disease identified. No mesenteric or retroperitoneal LN. No gross colonic lesion identified. Continue FOLFIRI + Avastin and repeat scans in 3 months. Cycle # 58 FOLFIRI + Avastin was on 11/06/2018. CEA 7.6 on 11/05/2018. Cycle # 59 FOLFIRI + Avastin was on 11/27/2018. CEA 6.9 on 11/26/2018. Cycle # 60 FOLFIRI + Avastin was on 12/11/2018. CEA 6.7 on 12/11/2018. Cycle # 61 FOLFIRI + Avastin was on 01/08/2019. CEA 5.6 on 01/07/2019. Cycle # 62 FOLFIRI + Avastin was on 01/29/2019. CEA 4.6 on 01/28/2019. Cycle # 63 FOLFIRI + Avastin was on 02/12/2019. CEA 4.3 on 02/11/2019. CT chest 02/21/2019 no evidence of metastatic disease. CT abdomen/pelvis 02/21/2019 stable hypodense liver lesions. No evidence of worsening metastatic disease.  Large right 5.7 on 07/06/2020. Cycle # 93 FOLFIRI + Avastin was on 07/21/2020. CEA 5.9 on 07/20/2020. Cycle # 94 FOLFIRI + Avastin was on 08/04/2020. CEA 5.5 on 08/04/2020. Cycle # 95 FOLFIRI + Avastin was on 08/25/2020. CEA 6.1 on 08/24/2020. CT chest 9/2/2020 stable unremarkable enhanced CT of the thorax. There is no metastatic disease to the lungs. CT abdomen pelvis on 9/2/2020 stable 2.2 cm low attenuated lesion within the central aspect of the right hepatic lobe favored to represent treated metastatic disease. No new hepatic lesion is identified. Stable splenomegaly  There is no appreciable colonic lesion or stricture  Pericholecystic fluid of uncertain etiology. General surgery team consulted. Laparoscopic cholecystectomy with normal cholangiogram 10/27/20  Gallbladder: Chronic cholecystitis and cholelithiasis.  Unremarkable regional lymph node. 11/13/2020; Seen by Dr. Albert Bush from General surgery team; cleared to re-start chemotherapy. Cycle # 96 FOLFIRI + Avastin was on 11/17/2020. CEA 3.6 on 11/16/2020. Cycle # 97 FOLFIRI + Avastin was on 12/01/2020. CEA 3.5 on 11/30/2020. Cycle # 98 FOLFIRI + Avastin was on 12/15/2020. CEA 4.3 on 12/15/2020. Cycle # 99 FOLFIRI + Avastin was on 01/05/2021. CEA 4.7 on 01/04/2021. CT chest 01/13/2021 negative for metastatic disease. CT abdomen/pelvis 01/13/2021 Unchanged central hepatic lesion. No specific signs of abdominopelvic metastatic disease. Continue FOLFIRI + Avastin and repeat scans in 3 months. Cycle # 100 FOLFIRI + Avastin was on 01/19/2021. CEA 4.7 on 01/18/2021. Cycle # 101 FOLFIRI + Avastin was on 02/02/2021. CEA 5.2 on 02/01/2021. Cycle # 102 FOLFIRI + Avastin was on 02/16/2021. CEA 5.6 on 02/15/2021. Cycle # 103 FOLFIRI + Avastin was on 03/02/2021. Cycle # 104 FOLFIRI (20% dose reduced due to significant toxicity) + Avastin was on 03/16/2021. Cycle # 105 FOLFIRI (same dose as cycle # 104) + Avastin was on 03/30/2021.  CEA 6.5 on 03/29/2021. Cycle # 106 FOLFIRI (same dose as cycle # 104) + Avastin was on 04/13/2021. CEA 6.0 on 04/12/2021  CT chest 04/20/2021 no evidence of metastatic disease. CT abdomen/pelvis 04/20/2021 No evidence of progressive metastatic disease. Stable 2 cm lesion in the right lobe of the liver, likely representing treated metastatic disease. Imaging reviewed. Continue FOLFIRI + Avastin and repeat scans in 3 months  Cycle # 107 FOLFIRI + Avastin was on 04/27/2021  Cycle # 108 FOLFIRI + Avastin was on 05/11/2021. Today 05/25/2021 for f/u. No fever, chills. improved fatigue. Felt better with chemo dose reduction. Saw Dr. Misty Kat for possible hernia repair    Review of Systems;  CONSTITUTIONAL: No fever, chills. Fair appetite. improved fatigue. ENMT: Eyes: No diplopia; Nose: No epistaxis. Mouth:No lesions  RESPIRATORY: No hemoptysis, shortness of breath. CARDIOVASCULAR: No chest pain, palpitations. GASTROINTESTINAL:No N/V. Hernia surgery by Dr. Eubanks Rom: No dysuria, urinary frequency, hematuria. NEURO: No syncope, presyncope, headache. Remainder ROS: Negative. Past Medical History:   Past Medical History               Diagnosis Date    Arthritis      Cancer ([address]         colon    Depression      Hyperlipidemia      Hypertension           Medications:  Reviewed and reconciled.     Allergies: Allergies   Allergen Reactions    Neosporin [Neomycin-Polymyxin-Gramicidin] Rash    Tape Dom Gayer Tape] Rash      Physical Exam:  /63   Pulse 98   Temp 98 °F (36.7 °C)   Resp 18   Ht 6' 1\" (1.854 m)   Wt 174 lb 8 oz (79.2 kg)   SpO2 100%   BMI 23.02 kg/m²   GENERAL: Alert, oriented x 3, not in distress  HEENT: PERRLA, EOMI. NECK: Supple. Without lymphadenopathy. LUNGS: Lungs are CTA bilaterally, with no wheezing, crackles or rhonchi. CARDIOVASCULAR: Regular rhythm. No murmurs, rubs or gallops. ABDOMEN: Soft. Non-tender, non-distended. Positive bowel sounds. hernia  EXTREMITIES: Without clubbing, cyanosis or edema. Skin toxicity in palms  NEUROLOGIC: No focal deficits. ECOG PS 1    Diagnostics:  Lab Results   Component Value Date    WBC 2.6 (L) 05/24/2021    HGB 7.9 (L) 05/24/2021    HCT 25.7 (L) 05/24/2021    .8 (H) 05/24/2021     (L) 05/24/2021     Lab Results   Component Value Date     05/24/2021    K 3.4 (L) 05/24/2021     05/24/2021    CO2 25 05/24/2021    BUN 16 05/24/2021    CREATININE 1.1 05/24/2021    GLUCOSE 126 (H) 05/24/2021    CALCIUM 8.7 05/24/2021    PROT 6.0 (L) 05/24/2021    LABALBU 3.7 05/24/2021    BILITOT 0.8 05/24/2021    ALKPHOS 189 (H) 05/24/2021    AST 21 05/24/2021    ALT 12 05/24/2021    LABGLOM >60 05/24/2021    GFRAA >60 05/24/2021     Lab Results   Component Value Date    CEA 6.3 (H) 05/24/2021     Impression/Plan:  67 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 KRAS Mutation: Mutation detected. BRAF Mutation: Mutation not detected. 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 CT scan abdomen/pelvis on 10/26/2015 revealed small nodules at the lung bases, extensive liver lesions consistent with metastatic rectosigmoid cancer. CEA 3488 on 10/30/2015. Bone scan on 11/10/2015 noted no metastatic disease. CT chest on 11/10/2015 revealed Multiple pulmonary nodules in the upper and lower lobes consistent with metastatic disease; Hepatic metastasis also visualized. 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 Mediport was placed. Cycle # 1 of FOLFOX + Avastin was on 11/23/2015. CEA was 4555 on 11/23/2015. Cycle # 2 of FOLFOX + Avastin was on 12/08/2015. CEA was 3160 on 12/08/2015. Cycle # 3 of FOLFOX + Avastin was on 12/22/2015. CEA was 2516 on 12/21/2015. Cycle # 4 of FOLFOX + Avastin was on 01/05/2016. CEA was 2098 on 01/05/2015. Cycle # 5 of FOLFOX + Avastin was on 01/19/2016.  CEA was 1511 on 01/05/2015.     -Bone scan on 01/26/2016 noted no metastatic disease. CT chest on 01/26/2016 revealed Significant response to treatment with no visible residual nodules. CT scan abdomen/pelvis on 01/26/2016 noted Interval decreased size of multiple masses in the liver compatible with treatment response. Continue another 2 months of FOLFOX + Avastin and repeat scans. Cycle # 6 of FOLFOX + Avastin was on 02/02/2016.  on 02/02/2016. Cycle # 7 of FOLFOX + Avastin was on 02/16/2016.  on 02/16/2016. Cycle # 8 of FOLFOX + Avastin was on 03/01/2016.  on 03/01/2016. Cycle # 9 of FOLFOX + Avastin was on 03/15/2016.  on 03/15/2016. Cycle # 10 of FOLFOX + Avastin was on 03/29/2016. .4 on 03/29/2016. Cycle # 11 of FOLFOX + Avastin was on 04/12/2016. .4 on 04/12/2016.     Re-staging scans on 04/19/2016: CT Chest: clear lungs; no evidence of recurring pulmonary nodule; CT Abdomen/Pelvis: Further interval decrease in size of the multiple metastatic hepatic lesions, the largest lesion now measures 3.9 x 3.5 cm and previously measured 4.5 cm in maximum diameter. No mesenteric lymphadenopathy is identified; Bone Scan: No evidence of osseous metastasis. Continue same regimen and re-stage in 2-3 months. Cycle # 12 FOLFOX + Avastin was on 04/26/2016. .5 on 04/26/2016. Cycle # 13 FOLFOX + Avastin was on 05/10/2016. .3 on 05/10/2016. Cycle # 14 FOLFOX+AVASTIN was on 05/24/2016. .5 on 05/24/2016. Cycle # 15 FOLFOX + Avastin was on 06/07/2016. CEA 90.5 on 06/07/2016. Admitted to [facility] 06/13/2016-06/16/2016 for abdominal pain: EGD noted 1.5 cm clean based duodenal bulb ulceration s/p epinephrine and bicap per Dr. Zechariah Gillette. No active bleeding. A. Stomach, biopsy: Mild chronic gastritis, immunostain negative for Helicobacter  B.  Esophagus, biopsy: Gastric glandular mucosa with prominent ntestinal metaplasia ([place]'s epithelium), negative for epithelial dysplasia, esophageal squamous mucosa not identified     Cycle # 16 FOLFOX (discontinued avastin (bevacizumab) given association of peptic ulcer disease and known association of GI perforation.) was on 06/21/2016. CEA 47 on 06/21/2016. Cycle # 17 FOLFOX was on 07/05/2016. CEA 52.6 on 07/05/2016. CEA 47.6 on 07/19/2016.     Re-staging scans 07/19/2106: CT Chest negative for metastatic disease. CT Abdomen/Pelvis: Stable hepatic lesions; Question new mural thickening in the cecum. Bone Scan: New Let hip lesion suspicious for bone metastasis.     Increased CEA; new mural thickening in the cecum and new left hip lesion suspicious for bone metastasis are consistent with disease progression; He derived maximum benefit from FOLFOX/AVASTIN. D/C FOLFOX/AVASTIN. We recommended FOLFIRI second line therapy. Cycle # 1 FOLFIRI was on 08/02/2016. CEA 40.8 on 08/02/2016. Xgeva q4 weeks started on 08/02/2016. Cycle # 2 FOLFIRI was on 08/16/2016. CEA 31.7 on 08/16/2016. Cycle # 3 FOLFIRI (added Avastin) was on 08/30/2016 given that ulcers healed on EGD 08/15/2016 by Dr. Greene Led; Protonix bid since avastin re-started. Colonoscopy on 08/29/2016 (to look at the cecal area) unremarkable. CEA 26.7 on 08/30/2016. Cycle # 4 FOLFIRI/Avastin was on 09/13/2016. CEA 23.4 on 09/13/2016. Cycle # 5 FOLFIRI/Avastin was on 09/27/2016. CEA 22.3 on 09/27/2016. Cycle # 6 FOLFIRI/Avastin was on 10/11/2016. CEA 18.4 on 10/11/2016.      Bone scan 10/21/2016 stable bone metastasis; ? New lesion anterior left sixth rib likely interval healing fracture. CT abdomen/pelvis revealed stable hepatic metastasis. CT chest negative for metastatic disease. Continue FOLFIRI/Avastin and repeat scans in 3 months. Cycle # 7 FOLFIRI/Avastin was on 10/25/2016. CEA 16.1 on 10/25/2016. Cycle # 8 FOLFIRI/Avastin was on 11/08/2016. CEA 15.7 on 11/08/2016. Cycle # 9 FOLFIRI/Avastin was on 11/29/2016. CEA 13.6 on 11/29/2016. Cycle # 10 FOLFIRI/Avastin was on 12/13/2016.  CEA 10.6 on 12/13/2016. Cycle # 11 FOLFIRI/Avastin was on 12/27/2016. CEA 11.1 on 12/27/2016. Cycle # 12 FOLFIRI/Avastin was on 01/10/2017. CEA 9.7 on 01/10/2017. CT chest 01/23/2017 No new pulmonary nodule or lymphadenopathy. Patchy groundglass opacities within the left lower lobe, suggestive of infectious or inflammatory etiology. Followup CT chest in 3 months. Slight increased linear sclerosis along the left anterior sixth rib corresponding to an area of increased uptake on the prior bone scan from 10/21/2016, favored to be related to interval healing changes of a nondisplaced fracture. CT abdomen/pelvis on 01/23/2017 Redemonstration of multiple hepatic metastases, which are similar compared to the prior CT from 10/21/2016. Redemonstration of area of increased sclerosis along the right acetabulum suspicious for metastatic disease. Bone scan on 01/23/2017 Stable subtle uptake within the left proximal diaphysis, again suggestive of metastatic disease  Decreased subtle uptake within the medial right acetabulum. Decreased uptake within the left anterior sixth rib corresponding to linear sclerosis on the recent CT, favored to be related to an joint rib fracture. Continue FOLFIRI + Avastin and repeat scans in 3 months. Cycle # 13 FOLFIRI + Avastin was on 01/24/2017. Cycle # 14 FOLFIRI + Avastin was on 02/07/2017. Cycle # 15 FOLFIRI + Avastin was on 02/21/2017. Cycle # 16 FOLFIRI + Avastin was on 03/07/2017. Cycle # 17 FOLFIRI + Avastin was on 03/21/2017. Cycle # 18 FOLFIRI + Avastin was on 04/04/2017. CT chest 04/17/2017 negative for metastatic disease. CT abdomen/pelvis 04/17/2017 Stable hypodense metastatic lesions within the liver. Stable area of increased sclerosis along the right acetabulum  Bone scan 04/17/2017 Stable subtle uptake within the left proximal femoral diaphysis;  No definite abnormal uptake in the region of a sclerotic lesion along the posterior column of the right acetabulum noted on CT.   Stable slight uptake within the left anterior sixth rib corresponding to linear sclerosis on the recent CT, favored to be related to a fracture. Continue FOLFIRI + Avastin and repeat scans in 3 months. Cycle # 19 FOLFIRI + Avastin was on 04/18/2017. CEA 7.5 on 04/18/2017. Cycle # 20 FOLFIRI + Avastin was on 05/02/2017. CEA 7.7 on 05/02/2017. Cycle # 21 FOLFIRI + Avastin was on 05/16/2017. CEA 7.1 on 05/16/2017. Cycle # 22 FOLFIRI + Avastin was on 05/30/2017. CEA 8.2 on 05/30/2017. Cycle # 23 FOLFIRI + Avastin was on 06/13/2017. CEA 7.7 on 06/13/2017. Cycle # 24 FOLFIRI + Avastin was on 06/27/2017. CEA 6.6 on 06/27/2017. Re-staging scans 07/05/2017:  Bone scan stable proximal left femoral diaphysis, right acetabulum, and left anterior sixth rib lesions;  CT abdomen/pelvis stable hypodense metastatic lesions within the liver; CT chest without convincing evidence of metastatic disease. Cycle # 25 FOLFIRI + Avastin was on 07/11/2017. CEA 6.3 on 07/11/2017. Cycle # 26 FOLFIRI + Avastin was on 07/25/2017. CEA 7.3 on 07/25/2017. Cycle # 27 FOLFIRI + Avastin was on 08/08/2017. CEA 6.5 on 08/08/2017. Cycle # 28 FOLFIRI + Avastin was on 08/22/2017. CEA 5.9 on 08/22/2017. Cycle # 29 FOLFIRI + Avastin was on 09/05/2017. CEA 6.3 on 09/05/2017. Cycle # 30 FOLFIRI + Avastin was on 09/19/2017. CEA 6.3 on 09/19/2017. Bone scan 09/26/2017 stable. CT abdomen/pelvis on 09/26/2017 Stable hypodense mass in the liver. Stable sclerotic lesion in the acetabulum. CT chest 09/26/2017 negative for metastatic disease. Cycle # 31 FOLFIRI + Avastin was on 10/03/2017. CEA 7.4 on 10/03/2017. Cycle # 32 FOLFIRI + Avastin was on 10/17/2017. CEA 6.0 on 10/17/2017. Cycle # 33 FOLFIRI + Avastin was on 10/31/2017. CEA 6.8 on 10/31/2017. Cycle # 34 FOLFIRI + Avastin was on 11/14/2017. CEA 7.2 on 11/14/2017. Cycle # 35 FOLFIRI + Avastin was on 11/28/2017. CEA 5.1 on 11/28/2017.    Cycle # 36 FOLFIRI + Avastin was on 12/12/2017. CEA 6.1 on 12/12/2017. Bone scan 12/22/2017 noted no evidence of metastatic disease to the axial or appendicular skeleton. CT chest 12/22/2017 noted no evidence of metastatic disease. CT abdomen/pelvis 12/22/2017 noted stable hypodense lesions in the liver. Continue FOLFIRI + Avastin and repeat scans in 3 months. Cycle # 37 FOLFIRI + Avastin was on 12/27/2018. CEA 6.7 on 12/27/2017. Cycle # 38 FOLFIRI + Avastin was on 01/09/2018. CEA 5.0 on 01/09/2018. Cycle # 39 FOLFIRI + Avastin was on 01/23/2018. CEA 6.5 on 01/23/2018. Cycle # 40 FOLFIRI + Avastin was on 02/06/2018. CEA 6.9 on 02/06/2018. Cycle # 41 FOLFIRI + Avastin was on 02/20/2018. CEA 6.4 on 02/20/2018. Cycle # 42 FOLFIRI + Avastin was on 03/06/2018. CEA 6.6 on 03/06/2018. Cycle # 43 FOLFIRI + Avastin was on 03/20/2018. CEA 5.7 on 03/20/2018. Bone scan on 03/26/2018 negative for metastatic disease. CT chest 03/26/2018 noted ? new 7 mm nodule in LUCY. CT abdomen/pelvis 03/26/2018 noted stable hypodense lesions in the liver. ? New small ascites in the abdomen. Patient wants to continue to monitor the lung finding and repeat scans in 2 months instead of changing the current chemotherapy regimen to oral Lonsurf. Cycle # 44 FOLFIRI + Avastin was on 04/03/2018. CEA 6.3 on 04/03/2018. Cycle # 45 FOLFIRI + Avastin was on 04/24/2018. CEA 5.3 on 04/24/2018. Cycle # 46 FOLFIRI + Avastin was on 05/08/2018. CEA 5.4 on 05/08/2018. Cycle # 47 FOLFIRI + Avastin was on 05/22/2018. CEA 6.1 on 05/22/2018. CT chest 05/31/2018 noted stable nodule in LUCY. No evidence of worsening malignancy. CT abdomen/pelvis on 05/31/2018 noted stable liver metastasis. No evidence of worsening malignancy. Continue FOLFIRI + Avastin and repeat scans in 3 months. Cycle # 48 FOLFIRI + Avastin was on 06/06/2018. CEA 6.3 on 06/05/2018. Cycle # 49 FOLFIRI + Avastin was on 06/19/2018. CEA 6.1 on 06/19/2018.    Cycle # 50 FOLFIRI + Avastin was on 07/03/2018. CEA 7.4 on 07/03/2018. Cycle # 51 FOLFIRI + Avastin was on 07/24/2018. CEA 6.7 on 07/24/2018. Cycle # 52 FOLFIRI + Avastin was on 08/14/2018. CEA 6.8 on 08/14/2018. Cycle # 53 FOLFIRI + Avastin was on 08/28/2018. CEA 6.5 on 08/27/2018. CT chest 09/06/2018 noted interval decrease in size of LUCY measuring up to 4 mm, suggestive of treatment response. No mediastinal or hilar LN  CT abdomen/pelvis 09/06/2018 Slight interval increase in size of a previously noted metastatic lesion within the right hepatic lobe. This may be related to differences in phase of enhancement compared to the most recent study from 5/31/2018, the lesion remains decreased in size compared to the more previous studies. No abdominal, retroperitoneal, or pelvic lymphadenopathy. Continue FOLFIRI + Avastin and repeat scans in 2 months. Cycle # 54 FOLFIRI + Avastin was on 09/11/2018. CEA 6.4 on 09/10/2018. Cycle # 55 FOLFIRI + Avastin was on 09/25/2018. CEA 6.4 on 09/25/2018. Cycle # 56 FOLFIRI + Avastin was on 10/09/2018. CEA 6.7 on 10/08/2018. Cycle # 57 FOLFIRI + Avastin was on 10/23/2018. CEA 7.2 on 10/22/2018. CT chest 11/02/2018 stable 3 mm nodule within LUCY. No new right and left lung nodule. No mediastinal or osseous lesion. CT abdomen/pelvis 11/02/2018 stable metastatic disease to liver. No new metastatic disease identified. No mesenteric or retroperitoneal LN. No gross colonic lesion identified. Continue FOLFIRI + Avastin and repeat scans in 3 months. Cycle # 58 FOLFIRI + Avastin was on 11/06/2018. CEA 7.6 on 11/05/2018. Cycle # 59 FOLFIRI + Avastin was on 11/27/2018. CEA 6.9 on 11/26/2018. Cycle # 60 FOLFIRI + Avastin was on 12/11/2018. CEA 6.7 on 12/11/2018. Cycle # 61 FOLFIRI + Avastin was on 01/08/2019. CEA 5.6 on 01/07/2019. Cycle # 62 FOLFIRI + Avastin was on 01/29/2019. CEA 4.6 on 01/28/2019. Cycle # 63 FOLFIRI + Avastin was on 02/12/2019. CEA 4.3 on 02/11/2019.   CT chest 02/21/2019 no evidence of metastatic disease. CT abdomen/pelvis 02/21/2019 stable hypodense liver lesions. No evidence of worsening metastatic disease. 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 Ordered MRI thoracic spine and referred to neurosurgery team.  Cycle # 64 FOLFIRI + Avastin was on 02/26/2019. CEA 4.7 on 02/25/2019. Cycle # 65 FOLFIRI + Avastin was on 03/12/2019. CEA 4.0 on 03/11/2019. Cycle # 66 FOLFIRI + Avastin was on 03/26/2019. CEA 4.7 on 03/25/2019. Cycle # 67 FOLFIRI + Avastin was on 04/09/2019. CEA 5.6 on 04/08/2019. Cycle # 68 FOLFIRI + Avastin was on 04/30/2019. CEA 4.9 on 04/29/2019. Cycle # 69 FOLFIRI + Avastin was on 05/14/2019. CEA 5.3 on 05/14/2019. CT chest 05/23/2019 stable small lung nodule with no evidence of metastatic disease. CT abdomen/pelvis 05/23/2019 Decrease conspicuity of the liver lesions. No new lesions seen. Stable splenomegaly. Continue FOLFIRI + Avastin and repeat scans in 3 months. Cycle # 70 FOLFIRI + Avastin was on 06/04/2019. CEA 4.8 on 06/03/2019. Cycle # 71 FOLFIRI + Avastin was on 06/18/2019. CEA 4.6 on 06/17/2019. Cycle # 72 FOLFIRI + Avastin was on 07/09/2019. CEA 4.3 on 07/08/2019. Cycle # 73 FOLFIRI + Avastin was on 07/30/2019. CEA 5.0 on 07/29/2019. Cycle # 74 FOLFIRI + Avastin was on 08/13/2019. CEA 5.0 on 08/12/2019. CT chest 08/20/2019 negative  CT abdomen/pelvis 08/20/2019 Previous identified hepatic lesions are not visualized on the current exam.  Continue FOLFIRI + Avastin and repeat scans in 3 months. Cycle # 75 FOLFIRI + Avastin was on 08/27/2019. CEA 5.0 on 08/26/2019. Cycle # 76 FOLFIRI + Avastin was on 09/17/2019. CEA 5.5 on 09/16/2019. Cycle # 77 FOLFIRI + Avastin was on 10/15/2019. CEA 4.6 on 10/15/2019. Cycle # 78 FOLFIRI + Avastin was on 10/29/2019. CEA 4.1 on 10/28/2019. Cycle # 79 FOLFIRI + Avastin was on 11/12/2019. CEA 4.3 on 11/12/2019. Cycle # 80 FOLFIRI + Avastin was on 12/10/2019.  CEA 4.0 on 12/09/2019. CT chest 12/19/2019 Stable 3 mm nodule within the left upper lobe, similar to the previous studies dating back to 11/2/2018, however decreased in size compared to the CT from 3/26/2018. No thoracic LN. CT abdomen/pelvis 12/19/2019 Previously described small hypodense lesions are more conspicuous on the current study compared to the recent study throughout the liver from 8/20/2019, however similar to the prior study from 2/21/2019. Findings may be related to differences in contrast opacification. No abdominal or pelvic lymphadenopathy. Continue FOLFIRI + Avastin and repeat scans in 2 months to f/u on liver lesions. Cycle # 81 FOLFIRI + Avastin was on 01/07/2020. CEA 3.8 on 01/06/2020. Cycle # 82 FOLFIRI + Avastin was on 01/21/2020. CEA 3.7 on 01/20/2020. Cycle # 83 FOLFIRI + Avastin was on 02/04/2020. CEA 4.1 on 02/03/2020. Cycle # 84 FOLFIRI + Avastin was on 02/18/2020. CEA 4.6 on 02/17/2020. CT chest 2/28/2020 no evidence of metastatic disease to the lungs and no mediastinal or hilar adenopathy. CT abdomen pelvis 2/28/2020 no enhancing lesions seen within the liver to suggest metastatic disease. Wall thickening of the rectosigmoid junction. Images reviewed. Continue FOLFIRI Avastin and repeat scans in 3 months  EGD by Dr. Petros Escalante 03/02/2020 showing no masses or lesions. Cycle # 85 FOLFIRI + Avastin was on 03/03/2020. CEA 7.4 on 03/02/2020. Cycle # 86 FOLFIRI + Avastin was on 03/17/2020. CEA 4.5 on 03/16/2020. Colonoscopy on 03/23/2020 by Dr. Petros Escalante unremarkable (records requested). Cycle # 87 FOLFIRI + Avastin was on 03/31/2020. CEA 4.1 on 03/30/2020. Cycle # 88 FOLFIRI + Avastin was on 04/21/2020. CEA 6.4 on 04/20/2020. Cycle # 89 FOLFIRI + Avastin was on 05/05/2020. CEA 5.8 on 05/04/2020. Cycle # 90 FOLFIRI + Avastin was on 06/02/2020. CEA 5.5 on 06/01/2020. Cycle # 91 FOLFIRI + Avastin was on 06/16/2020. CEA 5.6 on 06/15/2020.   CT chest 06/23/2020 noted no metastatic disease in the chest.   CT abdomen/pelvis 06/23/2020 Stable small liver lesions measuring up to 5 mm since 2019.   22 mm round mass in segment 8 of the liver with central high density stable from December 2019), previously measuring up to 34 mm in 2017. No additional metastatic disease in the abdomen or pelvis. Small amount of ascites. Overall stable disease. Continue FOLFIRI + Avastin and repeat scans in 2-3 months. Cycle # 92 FOLFIRI + Avastin was on 07/07/2020. CEA 5.7 on 07/06/2020. Cycle # 93 FOLFIRI + Avastin was on 07/21/2020. CEA 5.9 on 07/20/2020. Cycle # 94 FOLFIRI + Avastin was on 08/04/2020. CEA 5.5 on 08/04/2020. Cycle # 95 FOLFIRI + Avastin was on 08/25/2020. CEA 6.1 on 08/24/2020. CT chest 9/2/2020 stable unremarkable enhanced CT of the thorax. There is no metastatic disease to the lungs. CT abdomen pelvis on 9/2/2020 stable 2.2 cm low attenuated lesion within the central aspect of the right hepatic lobe favored to represent treated metastatic disease. No new hepatic lesion is identified. Stable splenomegaly  There is no appreciable colonic lesion or stricture. Pericholecystic fluid of uncertain etiology. Laparoscopic cholecystectomy with normal cholangiogram 10/27/20  Gallbladder: Chronic cholecystitis and cholelithiasis.  Unremarkable regional lymph node. 11/13/2020; Seen by Dr. Unique Clayton from General surgery team; cleared to re-start chemotherapy. Cycle # 96 FOLFIRI + Avastin was on 11/17/2020. CEA 3.6 on 11/16/2020. Cycle # 97 FOLFIRI + Avastin was on 12/01/2020. CEA 3.5 on 11/30/2020. Cycle # 98 FOLFIRI + Avastin was on 12/15/2020. CEA 4.3 on 12/15/2020. Cycle # 99 FOLFIRI + Avastin was on 01/05/2021. CEA 4.7 on 01/04/2021. CT chest 01/13/2021 negative for metastatic disease. CT abdomen/pelvis 01/13/2021 Unchanged central hepatic lesion. No specific signs of abdominopelvic metastatic disease. Continue FOLFIRI + Avastin and repeat scans in 3 months.   Cycle # 100 FOLFIRI + Avastin was on 01/19/2021. CEA 4.7 on 01/18/2021. Cycle # 101 FOLFIRI + Avastin was on 02/02/2021. CEA 5.2 on 02/01/2021. Cycle # 102 FOLFIRI + Avastin was on 02/16/2021. CEA 5.6 on 02/15/2021. Cycle # 103 FOLFIRI + Avastin was on 03/02/2021. Cycle # 104 FOLFIRI (20% dose reduced due to significant toxicity) + Avastin was on 03/16/2021. Cycle # 105 FOLFIRI (same dose as cycle # 104) + Avastin was on 03/30/2021. CEA 6.5 on 03/29/2021. Cycle # 106 FOLFIRI (same dose as cycle # 104) + Avastin was on 04/13/2021. CEA 6.0 on 04/12/2021  CT chest 04/20/2021 no evidence of metastatic disease. CT abdomen/pelvis 04/20/2021 No evidence of progressive metastatic disease. Stable 2 cm lesion in the right lobe of the liver, likely representing treated metastatic disease. Imaging reviewed. Continue FOLFIRI + Avastin and repeat scans in 3 months  Cycle # 107 FOLFIRI + Avastin was on 04/27/2021  Cycle # 108 FOLFIRI + Avastin was on 05/11/2021. CEA 6.4 on 05/10/2021. Cycle # 109 FOLFIRI (held Avastin due to upcoming surgery) is today 05/25/2021. Labs reviewed ok to proceed. CEA 6.3 on 05/24/2021.  Saw Dr. Shay Esparza for open right inguinal hernia repair with mesh    RTC 2 weeks for Cycle #110 FOLFIRI +/- Avastin  MSI testing noted no mismatch repair protein loss of expression    5/25/2021  Allyn Love MD  Board Certified Medical Oncologist

## 2021-05-25 NOTE — PROGRESS NOTES
Patient will behaving a surgery on June 7th, a hernia repair repair by dr Pino Mcneal. Dr Diehl Mantle notified and todays dose of [place] will be held.

## 2021-05-27 ENCOUNTER — HOSPITAL ENCOUNTER (OUTPATIENT)
Dept: INFUSION THERAPY | Age: 71
Discharge: HOME | End: 2021-05-27
Payer: MEDICARE

## 2021-05-27 DIAGNOSIS — Z51.11 ENCOUNTER FOR ANTINEOPLASTIC CHEMOTHERAPY: Primary | ICD-10-CM

## 2021-05-27 DIAGNOSIS — C20 RECTAL CANCER METASTASIZED TO LIVER (HCC): ICD-10-CM

## 2021-05-27 DIAGNOSIS — C78.7 RECTAL CANCER METASTASIZED TO LIVER (HCC): ICD-10-CM

## 2021-05-27 PROCEDURE — 6360000002 HC RX W HCPCS: Performed by: INTERNAL MEDICINE

## 2021-05-27 PROCEDURE — 96372 THER/PROPH/DIAG INJ SC/IM: CPT

## 2021-05-27 RX ADMIN — PEGFILGRASTIM-CBQV 6 MG: 6 INJECTION, SOLUTION SUBCUTANEOUS at 14:45

## 2021-05-27 NOTE — PROGRESS NOTES
Patient is due for Xgeva injection as well as Udenyca injection today 5/27/21. Patient request to postpone Xgeva injection until his next visit 6/8/21. Darryn Smith NP aware and scheduling aware to add injection to that appt.

## 2021-06-07 ENCOUNTER — HOSPITAL ENCOUNTER (OUTPATIENT)
Age: 71
Discharge: HOME | End: 2021-06-07
Payer: MEDICARE

## 2021-06-07 DIAGNOSIS — C78.7 RECTAL CANCER METASTASIZED TO LIVER (HCC): ICD-10-CM

## 2021-06-07 DIAGNOSIS — C20 RECTAL CANCER METASTASIZED TO LIVER (HCC): ICD-10-CM

## 2021-06-07 LAB
ALBUMIN SERPL-MCNC: 3.7 G/DL (ref 3.5–5.2)
ALP BLD-CCNC: 185 U/L (ref 40–129)
ALT SERPL-CCNC: 15 U/L (ref 0–40)
ANION GAP SERPL CALCULATED.3IONS-SCNC: 11 MMOL/L (ref 7–16)
ANISOCYTOSIS: ABNORMAL
AST SERPL-CCNC: 23 U/L (ref 0–39)
BASOPHILS ABSOLUTE: 0.02 E9/L (ref 0–0.2)
BASOPHILS RELATIVE PERCENT: 0.9 % (ref 0–2)
BILIRUB SERPL-MCNC: 0.7 MG/DL (ref 0–1.2)
BUN BLDV-MCNC: 16 MG/DL (ref 6–23)
CALCIUM SERPL-MCNC: 8.8 MG/DL (ref 8.6–10.2)
CEA: 6.3 NG/ML (ref 0–5.2)
CHLORIDE BLD-SCNC: 109 MMOL/L (ref 98–107)
CO2: 23 MMOL/L (ref 22–29)
CREAT SERPL-MCNC: 1.2 MG/DL (ref 0.7–1.2)
EOSINOPHILS ABSOLUTE: 0.06 E9/L (ref 0.05–0.5)
EOSINOPHILS RELATIVE PERCENT: 2.6 % (ref 0–6)
GFR AFRICAN AMERICAN: >60
GFR NON-AFRICAN AMERICAN: 59 ML/MIN/1.73
GLUCOSE BLD-MCNC: 143 MG/DL (ref 74–99)
HCT VFR BLD CALC: 25.3 % (ref 37–54)
HEMOGLOBIN: 7.7 G/DL (ref 12.5–16.5)
HYPOCHROMIA: ABNORMAL
LYMPHOCYTES ABSOLUTE: 0.35 E9/L (ref 1.5–4)
LYMPHOCYTES RELATIVE PERCENT: 14.8 % (ref 20–42)
MCH RBC QN AUTO: 31 PG (ref 26–35)
MCHC RBC AUTO-ENTMCNC: 30.4 % (ref 32–34.5)
MCV RBC AUTO: 102 FL (ref 80–99.9)
MONOCYTES ABSOLUTE: 0 E9/L (ref 0.1–0.95)
MONOCYTES RELATIVE PERCENT: 8.7 % (ref 2–12)
NEUTROPHILS ABSOLUTE: 1.89 E9/L (ref 1.8–7.3)
NEUTROPHILS RELATIVE PERCENT: 81.7 % (ref 43–80)
OVALOCYTES: ABNORMAL
PDW BLD-RTO: 18 FL (ref 11.5–15)
PLATELET # BLD: 112 E9/L (ref 130–450)
PMV BLD AUTO: 11.3 FL (ref 7–12)
POIKILOCYTES: ABNORMAL
POLYCHROMASIA: ABNORMAL
POTASSIUM SERPL-SCNC: 4 MMOL/L (ref 3.5–5)
RBC # BLD: 2.48 E12/L (ref 3.8–5.8)
SODIUM BLD-SCNC: 143 MMOL/L (ref 132–146)
TEAR DROP CELLS: ABNORMAL
TOTAL PROTEIN: 6 G/DL (ref 6.4–8.3)
WBC # BLD: 2.3 E9/L (ref 4.5–11.5)

## 2021-06-07 PROCEDURE — 85025 COMPLETE CBC W/AUTO DIFF WBC: CPT

## 2021-06-07 PROCEDURE — 80053 COMPREHEN METABOLIC PANEL: CPT

## 2021-06-07 PROCEDURE — 82378 CARCINOEMBRYONIC ANTIGEN: CPT

## 2021-06-07 PROCEDURE — 36415 COLL VENOUS BLD VENIPUNCTURE: CPT

## 2021-06-08 ENCOUNTER — OFFICE VISIT (OUTPATIENT)
Dept: ONCOLOGY | Age: 71
End: 2021-06-08
Payer: MEDICARE

## 2021-06-08 ENCOUNTER — HOSPITAL ENCOUNTER (OUTPATIENT)
Dept: INFUSION THERAPY | Age: 71
Discharge: HOME | End: 2021-06-08
Payer: MEDICARE

## 2021-06-08 VITALS — RESPIRATION RATE: 18 BRPM | HEART RATE: 57 BPM | SYSTOLIC BLOOD PRESSURE: 105 MMHG | DIASTOLIC BLOOD PRESSURE: 56 MMHG

## 2021-06-08 VITALS
DIASTOLIC BLOOD PRESSURE: 58 MMHG | TEMPERATURE: 98.6 F | WEIGHT: 169.8 LBS | HEIGHT: 73 IN | BODY MASS INDEX: 22.5 KG/M2 | OXYGEN SATURATION: 100 % | SYSTOLIC BLOOD PRESSURE: 112 MMHG | HEART RATE: 64 BPM

## 2021-06-08 DIAGNOSIS — C20 RECTAL CANCER METASTASIZED TO LIVER (HCC): Primary | ICD-10-CM

## 2021-06-08 DIAGNOSIS — C78.7 RECTAL CANCER METASTASIZED TO LIVER (HCC): Primary | ICD-10-CM

## 2021-06-08 DIAGNOSIS — C79.51 BONE METASTASIS (HCC): ICD-10-CM

## 2021-06-08 PROCEDURE — 6360000002 HC RX W HCPCS: Performed by: INTERNAL MEDICINE

## 2021-06-08 PROCEDURE — 99214 OFFICE O/P EST MOD 30 MIN: CPT | Performed by: INTERNAL MEDICINE

## 2021-06-08 PROCEDURE — 96415 CHEMO IV INFUSION ADDL HR: CPT

## 2021-06-08 PROCEDURE — 96375 TX/PRO/DX INJ NEW DRUG ADDON: CPT

## 2021-06-08 PROCEDURE — 96368 THER/DIAG CONCURRENT INF: CPT

## 2021-06-08 PROCEDURE — 96413 CHEMO IV INFUSION 1 HR: CPT

## 2021-06-08 PROCEDURE — 2580000003 HC RX 258: Performed by: INTERNAL MEDICINE

## 2021-06-08 PROCEDURE — 96372 THER/PROPH/DIAG INJ SC/IM: CPT

## 2021-06-08 PROCEDURE — 96411 CHEMO IV PUSH ADDL DRUG: CPT

## 2021-06-08 RX ORDER — SODIUM CHLORIDE 9 MG/ML
250 INJECTION, SOLUTION INTRAVENOUS CONTINUOUS
Status: CANCELLED | OUTPATIENT
Start: 2021-06-08 | End: 2021-06-26

## 2021-06-08 RX ORDER — DEXAMETHASONE SODIUM PHOSPHATE 10 MG/ML
10 INJECTION, SOLUTION INTRAMUSCULAR; INTRAVENOUS ONCE
Status: CANCELLED | OUTPATIENT
Start: 2021-06-08

## 2021-06-08 RX ORDER — SODIUM CHLORIDE 0.9 % (FLUSH) 0.9 %
10 SYRINGE (ML) INJECTION PRN
Status: DISCONTINUED | OUTPATIENT
Start: 2021-06-08 | End: 2021-06-09 | Stop reason: HOSPADM

## 2021-06-08 RX ORDER — ATROPINE SULFATE 0.4 MG/ML
0.4 AMPUL (ML) INJECTION ONCE
Status: COMPLETED | OUTPATIENT
Start: 2021-06-08 | End: 2021-06-08

## 2021-06-08 RX ORDER — FLUOROURACIL 50 MG/ML
650 INJECTION, SOLUTION INTRAVENOUS ONCE
Status: CANCELLED | OUTPATIENT
Start: 2021-06-08

## 2021-06-08 RX ORDER — DEXAMETHASONE SODIUM PHOSPHATE 10 MG/ML
10 INJECTION INTRAMUSCULAR; INTRAVENOUS ONCE
Status: COMPLETED | OUTPATIENT
Start: 2021-06-08 | End: 2021-06-08

## 2021-06-08 RX ORDER — DIPHENHYDRAMINE HYDROCHLORIDE 50 MG/ML
50 INJECTION INTRAMUSCULAR; INTRAVENOUS ONCE
Status: CANCELLED | OUTPATIENT
Start: 2021-06-08 | End: 2021-06-08

## 2021-06-08 RX ORDER — 0.9 % SODIUM CHLORIDE 0.9 %
10 VIAL (ML) INJECTION ONCE
Status: CANCELLED | OUTPATIENT
Start: 2021-06-08 | End: 2021-06-08

## 2021-06-08 RX ORDER — HEPARIN SODIUM (PORCINE) LOCK FLUSH IV SOLN 100 UNIT/ML 100 UNIT/ML
500 SOLUTION INTRAVENOUS PRN
Status: CANCELLED | OUTPATIENT
Start: 2021-06-08

## 2021-06-08 RX ORDER — METHYLPREDNISOLONE SODIUM SUCCINATE 125 MG/2ML
125 INJECTION, POWDER, LYOPHILIZED, FOR SOLUTION INTRAMUSCULAR; INTRAVENOUS ONCE
Status: CANCELLED | OUTPATIENT
Start: 2021-06-08 | End: 2021-06-08

## 2021-06-08 RX ORDER — ATROPINE SULFATE 0.4 MG/ML
0.4 AMPUL (ML) INJECTION ONCE
Status: CANCELLED | OUTPATIENT
Start: 2021-06-08 | End: 2021-06-08

## 2021-06-08 RX ORDER — PALONOSETRON HYDROCHLORIDE 0.05 MG/ML
0.25 INJECTION, SOLUTION INTRAVENOUS ONCE
Status: COMPLETED | OUTPATIENT
Start: 2021-06-08 | End: 2021-06-08

## 2021-06-08 RX ORDER — EPINEPHRINE 1 MG/ML
0.3 INJECTION, SOLUTION, CONCENTRATE INTRAVENOUS PRN
Status: CANCELLED | OUTPATIENT
Start: 2021-06-08

## 2021-06-08 RX ORDER — PALONOSETRON HYDROCHLORIDE 0.05 MG/ML
0.25 INJECTION, SOLUTION INTRAVENOUS ONCE
Status: CANCELLED | OUTPATIENT
Start: 2021-06-08

## 2021-06-08 RX ORDER — SODIUM CHLORIDE 9 MG/ML
INJECTION, SOLUTION INTRAVENOUS CONTINUOUS
Status: CANCELLED | OUTPATIENT
Start: 2021-06-08

## 2021-06-08 RX ORDER — SODIUM CHLORIDE 9 MG/ML
250 INJECTION, SOLUTION INTRAVENOUS CONTINUOUS
Status: DISCONTINUED | OUTPATIENT
Start: 2021-06-08 | End: 2021-06-09 | Stop reason: HOSPADM

## 2021-06-08 RX ORDER — HEPARIN SODIUM (PORCINE) LOCK FLUSH IV SOLN 100 UNIT/ML 100 UNIT/ML
500 SOLUTION INTRAVENOUS PRN
Status: DISCONTINUED | OUTPATIENT
Start: 2021-06-08 | End: 2021-06-09 | Stop reason: HOSPADM

## 2021-06-08 RX ORDER — SODIUM CHLORIDE 0.9 % (FLUSH) 0.9 %
10 SYRINGE (ML) INJECTION PRN
Status: CANCELLED | OUTPATIENT
Start: 2021-06-08

## 2021-06-08 RX ORDER — FLUOROURACIL 50 MG/ML
650 INJECTION, SOLUTION INTRAVENOUS ONCE
Status: COMPLETED | OUTPATIENT
Start: 2021-06-08 | End: 2021-06-08

## 2021-06-08 RX ADMIN — DEXAMETHASONE SODIUM PHOSPHATE 10 MG: 10 INJECTION INTRAMUSCULAR; INTRAVENOUS at 08:55

## 2021-06-08 RX ADMIN — PALONOSETRON 0.25 MG: 0.25 INJECTION, SOLUTION INTRAVENOUS at 08:52

## 2021-06-08 RX ADMIN — ATROPINE SULFATE 0.4 MG: 0.4 INJECTION, SOLUTION INTRAMUSCULAR; INTRAVENOUS; SUBCUTANEOUS at 09:12

## 2021-06-08 RX ADMIN — SODIUM CHLORIDE 320 MG: 9 INJECTION, SOLUTION INTRAVENOUS at 09:21

## 2021-06-08 RX ADMIN — Medication 10 ML: at 11:30

## 2021-06-08 RX ADMIN — Medication 10 ML: at 08:40

## 2021-06-08 RX ADMIN — SODIUM CHLORIDE 250 ML: 9 INJECTION, SOLUTION INTRAVENOUS at 08:40

## 2021-06-08 RX ADMIN — FLUOROURACIL 650 MG: 50 INJECTION, SOLUTION INTRAVENOUS at 11:15

## 2021-06-08 RX ADMIN — HEPARIN SODIUM (PORCINE) LOCK FLUSH IV SOLN 100 UNIT/ML 500 UNITS: 100 SOLUTION at 11:30

## 2021-06-08 RX ADMIN — DENOSUMAB 120 MG: 120 INJECTION SUBCUTANEOUS at 11:15

## 2021-06-08 RX ADMIN — LEUCOVORIN CALCIUM 650 MG: 10 INJECTION INTRAMUSCULAR; INTRAVENOUS at 09:21

## 2021-06-08 NOTE — PROGRESS NOTES
[place] [id number]  Attending Clinic Note     Reason for Visit: Follow-up on a patient with Metastatic Rectal Cancer.     PCP: Ger Alfonso MD     History of Present Illness:  65 y/o  male who was referred to see Dr. Fartun Kong (GI team) for evaluation of bright red blood per rectum, mild anemia and change in bowel habits with diarrhea. CEA 2640 on 10/19/2015. AlcP 299 AST 57 ALT 75 on 10/19/2015. Colonoscopy in 2013 noted no significant polyps, colitis or lesions at that time. Denies any Family History of colorectal cancer or polyps.     Colonoscopy on 10/19/2015 revealed:  1. Ascending polyp, 8 mm, hot snare: Tubulovillous adenoma. 2. Transverse polyp, 1 cm, hot snare: Serrated polyp most consistent with sessile serrated adenoma. 3. Five splenic flexure polyps, three - 5 mm, 7 mm, 8 mm, hot snare and biopsy: Four segments of Tubular Adenoma  4. Descending polyp, 5 mm, biopsy: Tubular adenoma. 5. Sigmoid polyp, 4 mm biopsy, Serrated polyp most consistent with sessile serrated adenoma. 6. Two rectal polyps, 4 mm biopsy: Serrated polyp most consistent with hyperplastic polyp. 7. Rectosigmoid colon mass (large mass approximately 65% circumference of the lumen; Very friable, firm and hard): Tubulovillous adenoma with associated focal erosion and fibroplasia.      CT scan abdomen/pelvis on 10/26/2015:  1. Small nodules at lung bases likely represent metastatic colon   cancer. 2. Extensive likely metastatic colon cancer throughout the liver.      3. Mild mural thickening and luminal narrowing in the   terminal ileum, otherwise nonspecific.      Colonoscopy with snare removal rectal mass was performed by Dr. Unique Clayton. Pathology proved:  Rectal polyp: Invasive adenocarcinoma involving villous adenoma and extending to the cauterized edge of excision. KRAS Mutation: Mutation detected.   BRAF Mutation: Mutation not detected. 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     CEA 3488. Bone scan on 11/10/2015 noted no metastatic disease. CT chest on 11/10/2015 revealed Multiple pulmonary nodules in the upper and lower lobes consistent with metastatic disease;   Hepatic metastasis also visualized. 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 Mediport was placed. Cycle # 1 of FOLFOX + Avastin was on 11/23/2015. CEA was 4555 on 11/23/2015. Cycle # 2 of FOLFOX + Avastin was on 12/08/2015. CEA was 3160 on 12/08/2015. Cycle # 3 of FOLFOX + Avastin was on 12/22/2015. CEA was 2516 on 12/21/2015. Cycle # 4 of FOLFOX + Avastin was on 01/05/2016. CEA was 2098 on 01/05/2015. Cycle # 5 of FOLFOX + Avastin was on 01/19/2016. CEA was 1511 on 01/05/2015.     -Bone scan on 01/26/2016 noted no metastatic disease. CT chest on 01/26/2016 revealed Significant response to treatment with no visible residual nodules. CT scan abdomen/pelvis on 01/26/2016 noted Interval decreased size of multiple masses in the liver compatible with treatment response. Continue another 2 months of FOLFOX + Avastin and repeat scans. Cycle # 6 of FOLFOX + Avastin was on 02/02/2016.  on 02/02/2016. Cycle # 7 of FOLFOX + Avastin was on 02/16/2016.  on 02/16/2016. Cycle # 8 of FOLFOX + Avastin was on 03/01/2016.  on 03/01/2016. Cycle # 9 of FOLFOX + Avastin was on 03/15/2016.  on 03/15/2016. Cycle # 10 of FOLFOX + Avastin was on 03/29/2016. .4 on 03/29/2016. Cycle # 11 of FOLFOX + Avastin was on 04/12/2016. .4 on 04/12/2016.     Re-staging scans on 04/19/2016: CT Chest: clear lungs; no evidence of recurring pulmonary nodule; CT Abdomen/Pelvis: Further interval decrease in size of the multiple metastatic hepatic lesions, the largest lesion now measures 3.9 x 3.5 cm and previously measured 4.5 cm in maximum diameter.  No mesenteric lymphadenopathy is identified; Bone Scan: No evidence of osseous metastasis. Continue same regimen and re-stage in 2-3 months. Cycle # 12 FOLFOX + Avastin was on 04/26/2016. .5 on 04/26/2016. Cycle # 13 FOLFOX + Avastin was on 05/10/2016. .3 on 05/10/2016. Cycle # 14 FOLFOX+AVASTIN was on 05/24/2016. .5 on 05/24/2016. Cycle # 15 FOLFOX + Avastin was on 06/07/2016. CEA 90.5 on 06/07/2016. Admitted to [facility] 06/13/2016-06/16/2016 for abdominal pain: EGD noted 1.5 cm clean based duodenal bulb ulceration s/p epinephrine and bicap per Dr. Pooja De Leon. No active bleeding. A. Stomach, biopsy: Mild chronic gastritis, immunostain negative for Helicobacter  B. Esophagus, biopsy: Gastric glandular mucosa with prominent intestinal metaplasia ([place]'s epithelium), negative for epithelial dysplasia, esophageal squamous mucosa not identified  Cycle # 16 FOLFOX (discontinued avastin (bevacizumab) given association of peptic ulcer disease and known association of GI perforation) was on 06/21/2016. Cycle # 17 FOLFOX was on 07/05/2016. CEA 52.6 on 07/19/2016. Cycle # 17 FOLFOX was on 07/05/2016. CEA 52.6 on 07/05/2016. CEA 47.6 on 07/19/2016.     Re-staging scans 07/19/2106: CT Chest negative for metastatic disease. CT Abdomen/Pelvis: Stable hepatic lesions; Question new mural thickening in the cecum. Bone Scan: New Let hip lesion suspicious for bone metastasis.     Increased CEA; new mural thickening in the cecum and new left hip lesion suspicious for bone metastasis are consistent with disease progression; He derived maximum benefit from FOLFOX/AVASTIN. D/C FOLFOX/AVASTIN. We recommended FOLFIRI second line therapy. Cycle # 1 FOLFIRI was on 08/02/2016. CEA 40.8 on 08/02/2016. Xgeva q4 weeks started on 08/02/2016. Cycle # 2 FOLFIRI was on 08/16/2016. CEA 31.7 on 08/16/2016. Cycle # 3 FOLFIRI (added Avastin) was on 08/30/2016 given that ulcers healed on EGD 08/15/2016 by Dr. Velvet Addison; Protonix bid since avastin re-started.    Colonoscopy on 08/29/2016 (to look at the cecal area) unremarkable. CEA 26.7 on 08/30/2016. Cycle # 4 FOLFIRI/Avastin was on 09/13/2016. CEA 23.4 on 09/13/2016. Cycle # 5 FOLFIRI/Avastin was on 09/27/2016. CEA 22.3 on 09/27/2016. Cycle # 6 FOLFIRI/Avastin was on 10/11/2016. CEA 18.4 on 10/11/2016.      Bone scan 10/21/2016 stable bone metastasis; ? New lesion anterior left sixth rib likely interval healing fracture. CT abdomen/pelvis revealed stable hepatic metastasis. CT chest negative for metastatic disease. Continue FOLFIRI/Avastin and repeat scans in 3 months. Cycle # 7 FOLFIRI/Avastin was on 10/25/2016. CEA 16.1  Cycle # 8 FOLFIRI/Avastin was on 11/08/2016. CEA 15.7  Cycle # 9 FOLFIRI/Avastin was on 11/29/2016. CEA 13.6 on 11/29/2016. Cycle # 10 FOLFIRI/Avastin was on 12/13/2016. CEA 10.6 on 12/13/2016. Cycle # 11 FOLFIRI/Avastin was on 12/27/2016. CEA 11.1 on 12/27/2016. Cycle # 12 FOLFIRI/Avastin was on 01/10/2017. CEA 9.7 on 01/10/2017.       CT chest 01/23/2017 No new pulmonary nodule or lymphadenopathy. Patchy groundglass opacities within the left lower lobe, suggestive of infectious or inflammatory etiology. Followup CT chest in 3 months. Slight increased linear sclerosis along the left anterior sixth rib corresponding to an area of increased uptake on the prior bone scan from 10/21/2016, favored to be related to interval healing changes of a nondisplaced fracture. CT abdomen/pelvis on 01/23/2017 Redemonstration of multiple hepatic metastases, which are similar compared to the prior CT from 10/21/2016. Redemonstration of area of increased sclerosis along the right acetabulum suspicious for metastatic disease. Bone scan on 01/23/2017 Stable subtle uptake within the left proximal diaphysis, again suggestive of metastatic disease  Decreased subtle uptake within the medial right acetabulum.    Decreased uptake within the left anterior sixth rib corresponding to linear sclerosis on the recent CT, favored to be related to an joint rib fracture. Continue FOLFIRI + Avastin and repeat scans in 3 months. Cycle # 13 FOLFIRI + Avastin was on 01/24/2017. Cycle # 14 FOLFIRI + Avastin was on 02/07/2017. Cycle # 15 FOLFIRI + Avastin was on 02/21/2017. Cycle # 16 FOLFIRI + Avastin was on 03/07/2017. Cycle # 17 FOLFIRI + Avastin was on 03/21/2017. Cycle # 18 FOLFIRI + Avastin was on 04/04/2017. CT chest 04/17/2017 negative for metastatic disease. CT abdomen/pelvis 04/17/2017 Stable hypodense metastatic lesions within the liver. Stable area of increased sclerosis along the right acetabulum  Bone scan 04/17/2017 Stable subtle uptake within the left proximal femoral diaphysis; No definite abnormal uptake in the region of a sclerotic lesion along the posterior column of the right acetabulum noted on CT. Stable slight uptake within the left anterior sixth rib corresponding to linear sclerosis on the recent CT, favored to be related to a fracture. Continue FOLFIRI + Avastin and repeat scans in 3 months. Cycle # 19 FOLFIRI + Avastin was on 04/18/2017. Cycle # 20 FOLFIRI + Avastin was on 05/02/2017. Cycle # 21 FOLFIRI + Avastin was on 05/16/2017. Cycle # 22 FOLFIRI + Avastin was on 05/30/2017. Cycle # 23 FOLFIRI + Avastin was on 06/13/2017. Cycle # 24 FOLFIRI + Avastin was on 06/27/2017. Cycle # 25 FOLFIRI + Avastin was on 07/11/2017. Cycle # 26 FOLFIRI + Avastin was on 07/25/2017. Cycle # 27 FOLFIRI + Avastin was on 08/08/2017. Cycle # 28 FOLFIRI + Avastin was on 08/22/2017. Cycle # 29 FOLFIRI + Avastin was on 09/05/2017. Cycle # 30 FOLFIRI + Avastin was on 09/19/2017. Bone scan 09/26/2017 stable. CT abdomen/pelvis on 09/26/2017 Stable hypodense mass in the liver. Stable sclerotic lesion in the acetabulum. CT chest 09/26/2017 negative for metastatic disease. Cycle # 31 FOLFIRI + Avastin was on 10/03/2017. CEA 7.4 on 10/03/2017. Cycle # 32 FOLFIRI + Avastin was on 10/17/2017.  CEA 6.0 on 10/17/2017. Cycle # 33 FOLFIRI + Avastin was on 10/31/2017. CEA 6.8 on 10/31/2017. Cycle # 34 FOLFIRI + Avastin was on 11/14/2017. CEA 7.2 on 11/14/2017. Cycle # 35 FOLFIRI + Avastin was on 11/28/2017. CEA 5.1 on 11/28/2017. Cycle # 36 FOLFIRI + Avastin was on 12/12/2017. CEA 6.1 on 12/12/2017. Bone scan 12/22/2017 noted no evidence of metastatic disease to the axial or appendicular skeleton. CT chest 12/22/2017 noted no evidence of metastatic disease. CT abdomen/pelvis 12/22/2017 noted stable hypodense lesions in the liver. Continue FOLFIRI + Avastin and repeat scans in 3 months. Cycle # 37 FOLFIRI + Avastin was on 12/27/2018. Cycle # 38 FOLFIRI + Avastin was on 01/09/2018. Cycle # 39 FOLFIRI + Avastin was on 01/23/2018. Cycle # 40 FOLFIRI + Avastin was on 02/06/2018. Cycle # 41 FOLFIRI + Avastin was on 02/20/2018. Cycle # 42 FOLFIRI + Avastin was on 03/06/2018. Cycle # 43 FOLFIRI + Avastin was on 03/20/2018. Bone scan on 03/26/2018 negative for metastatic disease. CT chest 03/26/2018 noted ? new 7 mm nodule in LUCY. CT abdomen/pelvis 03/26/2018 noted stable hypodense lesions in the liver. ? New small ascites in the abdomen. Patient wants to continue to monitor the lung finding and repeat scans in 2 months instead of changing the current regimen into oral Lonsurf. Cycle # 44 FOLFIRI + Avastin was on 04/03/2018. CEA 6.3 on 04/03/2018. Cycle # 45 FOLFIRI + Avastin was on 04/24/2018. CEA 5.3 on 04/24/2018. Cycle # 46 FOLFIRI + Avastin was on 05/08/2018. CEA 5.4 on 05/08/2018. Cycle # 47 FOLFIRI + Avastin was on 05/22/2018. CEA 6.1 on 05/22/2018. CT chest 05/31/2018 noted stable nodule in LUCY. No evidence of worsening malignancy. CT abdomen/pelvis on 05/31/2018 noted stable liver metastasis. No evidence of worsening malignancy. Continue FOLFIRI + Avastin and repeat scans in 3 months. Cycle # 48 FOLFIRI + Avastin was on 06/06/2018. Cycle # 49 FOLFIRI + Avastin was on 06/19/2018. 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 Ordered MRI thoracic spine and referred to neurosurgery team.    Cycle # 64 FOLFIRI + Avastin was on 02/26/2019. CEA 4.7 on 02/25/2019. Cycle # 65 FOLFIRI + Avastin was on 03/12/2019. CEA 4.0 on 03/11/2019. Cycle # 66 FOLFIRI + Avastin was on 03/26/2019. CEA 4.7 on 03/25/2019. Cycle # 67 FOLFIRI + Avastin was on 04/09/2019. CEA 5.6 on 04/08/2019. Cycle # 68 FOLFIRI + Avastin was on 04/30/2019. CEA 4.9 on 04/29/2019. Cycle # 69 FOLFIRI + Avastin was on 05/14/2019. CEA 5.3 on 05/14/2019. CT chest 05/23/2019 stable small lung nodule with no evidence of metastatic disease. CT abdomen/pelvis 05/23/2019 Decrease conspicuity of the liver lesions. No new lesions seen. Stable splenomegaly. Continue FOLFIRI + Avastin and repeat scans in 3 months. Cycle # 70 FOLFIRI + Avastin was on 06/04/2019. CEA 4.8 on 06/03/2019. Cycle # 71 FOLFIRI + Avastin was on 06/18/2019. CEA 4.6 on 06/17/2019. Cycle # 72 FOLFIRI + Avastin was on 07/09/2019. CEA 4.3 on 07/08/2019. Cycle # 73 FOLFIRI + Avastin was on 07/30/2019. CEA 5.0 on 07/29/2019. Cycle # 74 FOLFIRI + Avastin was on 08/13/2019. CEA 5.0 on 08/12/2019. CT chest 08/20/2019 negative  CT abdomen/pelvis 08/20/2019 Previous identified hepatic lesions are not visualized on the current exam.  Continue FOLFIRI + Avastin and repeat scans in 3 months. Cycle # 75 FOLFIRI + Avastin was on 08/27/2019. CEA 5.0 on 08/26/2019. Cycle # 76 FOLFIRI + Avastin was on 09/17/2019. CEA 5.5 on 09/16/2019. Cycle # 77 FOLFIRI + Avastin was on 10/15/2019. CEA 4.6 on 10/15/2019. Cycle # 78 FOLFIRI + Avastin was on 10/29/2019. CEA 4.1 on 10/28/2019. Cycle # 79 FOLFIRI + Avastin was on 11/12/2019. CEA 4.3 on 11/12/2019. Cycle # 80 FOLFIRI + Avastin was on 12/10/2019. CEA 4.0 on 12/09/2019.   CT chest 12/19/2019 Stable 3 mm nodule within the left upper lobe, similar to the previous studies dating back to 11/2/2018, however decreased in size compared to the CT from 3/26/2018. No thoracic LN. CT abdomen/pelvis 12/19/2019 Previously described small hypodense lesions are more conspicuous on the current study compared to the recent study throughout the liver from 8/20/2019, however similar to the prior study from 2/21/2019. Findings may be related to differences in contrast opacification. No abdominal or pelvic lymphadenopathy. Continue FOLFIRI + Avastin and repeat scans in 2 months to f/u on liver lesions. Cycle # 81 FOLFIRI + Avastin was on 01/07/2020. CEA 3.8 on 01/06/2020. Cycle # 82 FOLFIRI + Avastin was on 01/21/2020. CEA 3.7 on 01/20/2020. Cycle # 83 FOLFIRI + Avastin was on 02/04/2020. CEA 4.1 on 02/03/2020. Cycle # 84 FOLFIRI + Avastin was on 02/18/2020. CEA 4.6 on 02/17/2020. EGD by Dr. Kristina Hall 03/02/2020 showing no masses or lesions  Cycle # 85 FOLFIRI + Avastin was on 03/03/2020. CEA 7.4 on 03/02/2020. Cycle # 86 FOLFIRI + Avastin was on 03/17/2020. CEA 4.5 on 03/16/2020. Colonoscopy on 03/23/2020 by Dr. Kristina Hall unremarkable (records requested). Cycle # 87 FOLFIRI + Avastin was on 03/31/2020. CEA 4.1 on 03/30/2020. Cycle # 88 FOLFIRI + Avastin was on 04/21/2020. CEA 6.4 on 04/20/2020. Cycle # 89 FOLFIRI + Avastin was on 05/05/2020. CEA 5.8 on 05/04/2020. Cycle # 90 FOLFIRI + Avastin was on 06/02/2020. CEA 5.5 on 06/01/2020. Cycle # 91 FOLFIRI + Avastin was on 06/16/2020. CEA 5.6 on 06/15/2020. CT chest 06/23/2020 noted no metastatic disease in the chest.   CT abdomen/pelvis 06/23/2020 Stable small liver lesions measuring up to 5 mm since 2019.   22 mm round mass in segment 8 of the liver with central high density stable from December 2019), previously measuring up to 34 mm in 2017. No additional metastatic disease in the abdomen or pelvis. Small amount of ascites. Overall stable disease. Continue FOLFIRI + Avastin and repeat scans in 2-3 months. Cycle # 92 FOLFIRI + Avastin was on 07/07/2020.  CEA 03/29/2021. Cycle # 106 FOLFIRI (same dose as cycle # 104) + Avastin was on 04/13/2021. CEA 6.0 on 04/12/2021  CT chest 04/20/2021 no evidence of metastatic disease. CT abdomen/pelvis 04/20/2021 No evidence of progressive metastatic disease. Stable 2 cm lesion in the right lobe of the liver, likely representing treated metastatic disease. Imaging reviewed. Continue FOLFIRI + Avastin and repeat scans in 3 months  Cycle # 107 FOLFIRI + Avastin was on 04/27/2021  Cycle # 108 FOLFIRI + Avastin was on 05/11/2021. Cycle # 109 FOLFIRI (held Avastin due to upcoming surgery) on 05/25/2021. CEA 6.3 on 5/24/21  Cycle # 110 FOLFIRI is today 06/08/2021. CEA 6.3 on 06/07/2021. Today 06/08/2021 for f/u. No fever, chills. improved fatigue. Felt better with chemo dose reduction. Review of Systems;  CONSTITUTIONAL: No fever, chills. Fair appetite. improved fatigue. ENMT: Eyes: No diplopia; Nose: No epistaxis. Mouth:No lesions  RESPIRATORY: No hemoptysis, shortness of breath. CARDIOVASCULAR: No chest pain, palpitations. GASTROINTESTINAL:No N/V. Hernia surgery by Dr. Micah Bobo: No dysuria, urinary frequency, hematuria. NEURO: No syncope, presyncope, headache. Remainder ROS: Negative. Past Medical History:   Past Medical History               Diagnosis Date    Arthritis      Cancer ([facility] [address]         colon    Depression      Hyperlipidemia      Hypertension           Medications:  Reviewed and reconciled.     Allergies: Allergies   Allergen Reactions    Neosporin [Neomycin-Polymyxin-Gramicidin] Rash    Tape Brittanie Riley Tape] Rash      Physical Exam:  BP (!) 112/58 (Site: Right Upper Arm, Position: Sitting, Cuff Size: Medium Adult)   Pulse 64   Temp 98.6 °F (37 °C) (Temporal)   Ht 6' 1\" (1.854 m)   Wt 169 lb 12.8 oz (77 kg)   SpO2 100%   BMI 22.40 kg/m²   GENERAL: Alert, oriented x 3, not in distress  HEENT: PERRLA, EOMI. NECK: Supple. Without lymphadenopathy.    LUNGS: Lungs are CTA 12/21/2015. Cycle # 4 of FOLFOX + Avastin was on 01/05/2016. CEA was 2098 on 01/05/2015. Cycle # 5 of FOLFOX + Avastin was on 01/19/2016. CEA was 1511 on 01/05/2015.     -Bone scan on 01/26/2016 noted no metastatic disease. CT chest on 01/26/2016 revealed Significant response to treatment with no visible residual nodules. CT scan abdomen/pelvis on 01/26/2016 noted Interval decreased size of multiple masses in the liver compatible with treatment response. Continue another 2 months of FOLFOX + Avastin and repeat scans. Cycle # 6 of FOLFOX + Avastin was on 02/02/2016.  on 02/02/2016. Cycle # 7 of FOLFOX + Avastin was on 02/16/2016.  on 02/16/2016. Cycle # 8 of FOLFOX + Avastin was on 03/01/2016.  on 03/01/2016. Cycle # 9 of FOLFOX + Avastin was on 03/15/2016.  on 03/15/2016. Cycle # 10 of FOLFOX + Avastin was on 03/29/2016. .4 on 03/29/2016. Cycle # 11 of FOLFOX + Avastin was on 04/12/2016. .4 on 04/12/2016.     Re-staging scans on 04/19/2016: CT Chest: clear lungs; no evidence of recurring pulmonary nodule; CT Abdomen/Pelvis: Further interval decrease in size of the multiple metastatic hepatic lesions, the largest lesion now measures 3.9 x 3.5 cm and previously measured 4.5 cm in maximum diameter. No mesenteric lymphadenopathy is identified; Bone Scan: No evidence of osseous metastasis. Continue same regimen and re-stage in 2-3 months. Cycle # 12 FOLFOX + Avastin was on 04/26/2016. .5 on 04/26/2016. Cycle # 13 FOLFOX + Avastin was on 05/10/2016. .3 on 05/10/2016. Cycle # 14 FOLFOX+AVASTIN was on 05/24/2016. .5 on 05/24/2016. Cycle # 15 FOLFOX + Avastin was on 06/07/2016. CEA 90.5 on 06/07/2016. Admitted to [facility] 06/13/2016-06/16/2016 for abdominal pain: EGD noted 1.5 cm clean based duodenal bulb ulceration s/p epinephrine and bicap per Dr. Alexandre Lee. No active bleeding.    A. Stomach, biopsy: Mild chronic gastritis, immunostain negative for Helicobacter  B. Esophagus, biopsy: Gastric glandular mucosa with prominent ntestinal metaplasia ([place]'s epithelium), negative for epithelial dysplasia, esophageal squamous mucosa not identified     Cycle # 16 FOLFOX (discontinued avastin (bevacizumab) given association of peptic ulcer disease and known association of GI perforation.) was on 06/21/2016. CEA 47 on 06/21/2016. Cycle # 17 FOLFOX was on 07/05/2016. CEA 52.6 on 07/05/2016. CEA 47.6 on 07/19/2016.     Re-staging scans 07/19/2106: CT Chest negative for metastatic disease. CT Abdomen/Pelvis: Stable hepatic lesions; Question new mural thickening in the cecum. Bone Scan: New Let hip lesion suspicious for bone metastasis.     Increased CEA; new mural thickening in the cecum and new left hip lesion suspicious for bone metastasis are consistent with disease progression; He derived maximum benefit from FOLFOX/AVASTIN. D/C FOLFOX/AVASTIN. We recommended FOLFIRI second line therapy. Cycle # 1 FOLFIRI was on 08/02/2016. CEA 40.8 on 08/02/2016. Xgeva q4 weeks started on 08/02/2016. Cycle # 2 FOLFIRI was on 08/16/2016. CEA 31.7 on 08/16/2016. Cycle # 3 FOLFIRI (added Avastin) was on 08/30/2016 given that ulcers healed on EGD 08/15/2016 by Dr. Lo Hutson; Protonix bid since avastin re-started. Colonoscopy on 08/29/2016 (to look at the cecal area) unremarkable. CEA 26.7 on 08/30/2016. Cycle # 4 FOLFIRI/Avastin was on 09/13/2016. CEA 23.4 on 09/13/2016. Cycle # 5 FOLFIRI/Avastin was on 09/27/2016. CEA 22.3 on 09/27/2016. Cycle # 6 FOLFIRI/Avastin was on 10/11/2016. CEA 18.4 on 10/11/2016.      Bone scan 10/21/2016 stable bone metastasis; ? New lesion anterior left sixth rib likely interval healing fracture. CT abdomen/pelvis revealed stable hepatic metastasis. CT chest negative for metastatic disease. Continue FOLFIRI/Avastin and repeat scans in 3 months. Cycle # 7 FOLFIRI/Avastin was on 10/25/2016. CEA 16.1 on 10/25/2016.   Cycle # 8 FOLFIRI/Avastin was on 11/08/2016. CEA 15.7 on 11/08/2016. Cycle # 9 FOLFIRI/Avastin was on 11/29/2016. CEA 13.6 on 11/29/2016. Cycle # 10 FOLFIRI/Avastin was on 12/13/2016. CEA 10.6 on 12/13/2016. Cycle # 11 FOLFIRI/Avastin was on 12/27/2016. CEA 11.1 on 12/27/2016. Cycle # 12 FOLFIRI/Avastin was on 01/10/2017. CEA 9.7 on 01/10/2017. CT chest 01/23/2017 No new pulmonary nodule or lymphadenopathy. Patchy groundglass opacities within the left lower lobe, suggestive of infectious or inflammatory etiology. Followup CT chest in 3 months. Slight increased linear sclerosis along the left anterior sixth rib corresponding to an area of increased uptake on the prior bone scan from 10/21/2016, favored to be related to interval healing changes of a nondisplaced fracture. CT abdomen/pelvis on 01/23/2017 Redemonstration of multiple hepatic metastases, which are similar compared to the prior CT from 10/21/2016. Redemonstration of area of increased sclerosis along the right acetabulum suspicious for metastatic disease. Bone scan on 01/23/2017 Stable subtle uptake within the left proximal diaphysis, again suggestive of metastatic disease  Decreased subtle uptake within the medial right acetabulum. Decreased uptake within the left anterior sixth rib corresponding to linear sclerosis on the recent CT, favored to be related to an joint rib fracture. Continue FOLFIRI + Avastin and repeat scans in 3 months. Cycle # 13 FOLFIRI + Avastin was on 01/24/2017. Cycle # 14 FOLFIRI + Avastin was on 02/07/2017. Cycle # 15 FOLFIRI + Avastin was on 02/21/2017. Cycle # 16 FOLFIRI + Avastin was on 03/07/2017. Cycle # 17 FOLFIRI + Avastin was on 03/21/2017. Cycle # 18 FOLFIRI + Avastin was on 04/04/2017. CT chest 04/17/2017 negative for metastatic disease. CT abdomen/pelvis 04/17/2017 Stable hypodense metastatic lesions within the liver.  Stable area of increased sclerosis along the right acetabulum  Bone scan 04/17/2017 Stable subtle uptake within the left proximal femoral diaphysis; No definite abnormal uptake in the region of a sclerotic lesion along the posterior column of the right acetabulum noted on CT. Stable slight uptake within the left anterior sixth rib corresponding to linear sclerosis on the recent CT, favored to be related to a fracture. Continue FOLFIRI + Avastin and repeat scans in 3 months. Cycle # 19 FOLFIRI + Avastin was on 04/18/2017. CEA 7.5 on 04/18/2017. Cycle # 20 FOLFIRI + Avastin was on 05/02/2017. CEA 7.7 on 05/02/2017. Cycle # 21 FOLFIRI + Avastin was on 05/16/2017. CEA 7.1 on 05/16/2017. Cycle # 22 FOLFIRI + Avastin was on 05/30/2017. CEA 8.2 on 05/30/2017. Cycle # 23 FOLFIRI + Avastin was on 06/13/2017. CEA 7.7 on 06/13/2017. Cycle # 24 FOLFIRI + Avastin was on 06/27/2017. CEA 6.6 on 06/27/2017. Re-staging scans 07/05/2017:  Bone scan stable proximal left femoral diaphysis, right acetabulum, and left anterior sixth rib lesions;  CT abdomen/pelvis stable hypodense metastatic lesions within the liver; CT chest without convincing evidence of metastatic disease. Cycle # 25 FOLFIRI + Avastin was on 07/11/2017. CEA 6.3 on 07/11/2017. Cycle # 26 FOLFIRI + Avastin was on 07/25/2017. CEA 7.3 on 07/25/2017. Cycle # 27 FOLFIRI + Avastin was on 08/08/2017. CEA 6.5 on 08/08/2017. Cycle # 28 FOLFIRI + Avastin was on 08/22/2017. CEA 5.9 on 08/22/2017. Cycle # 29 FOLFIRI + Avastin was on 09/05/2017. CEA 6.3 on 09/05/2017. Cycle # 30 FOLFIRI + Avastin was on 09/19/2017. CEA 6.3 on 09/19/2017. Bone scan 09/26/2017 stable. CT abdomen/pelvis on 09/26/2017 Stable hypodense mass in the liver. Stable sclerotic lesion in the acetabulum. CT chest 09/26/2017 negative for metastatic disease. Cycle # 31 FOLFIRI + Avastin was on 10/03/2017. CEA 7.4 on 10/03/2017. Cycle # 32 FOLFIRI + Avastin was on 10/17/2017. CEA 6.0 on 10/17/2017. Cycle # 33 FOLFIRI + Avastin was on 10/31/2017.  CEA 6.8 on months. Cycle # 48 FOLFIRI + Avastin was on 06/06/2018. CEA 6.3 on 06/05/2018. Cycle # 49 FOLFIRI + Avastin was on 06/19/2018. CEA 6.1 on 06/19/2018. Cycle # 50 FOLFIRI + Avastin was on 07/03/2018. CEA 7.4 on 07/03/2018. Cycle # 51 FOLFIRI + Avastin was on 07/24/2018. CEA 6.7 on 07/24/2018. Cycle # 52 FOLFIRI + Avastin was on 08/14/2018. CEA 6.8 on 08/14/2018. Cycle # 53 FOLFIRI + Avastin was on 08/28/2018. CEA 6.5 on 08/27/2018. CT chest 09/06/2018 noted interval decrease in size of LUCY measuring up to 4 mm, suggestive of treatment response. No mediastinal or hilar LN  CT abdomen/pelvis 09/06/2018 Slight interval increase in size of a previously noted metastatic lesion within the right hepatic lobe. This may be related to differences in phase of enhancement compared to the most recent study from 5/31/2018, the lesion remains decreased in size compared to the more previous studies. No abdominal, retroperitoneal, or pelvic lymphadenopathy. Continue FOLFIRI + Avastin and repeat scans in 2 months. Cycle # 54 FOLFIRI + Avastin was on 09/11/2018. CEA 6.4 on 09/10/2018. Cycle # 55 FOLFIRI + Avastin was on 09/25/2018. CEA 6.4 on 09/25/2018. Cycle # 56 FOLFIRI + Avastin was on 10/09/2018. CEA 6.7 on 10/08/2018. Cycle # 57 FOLFIRI + Avastin was on 10/23/2018. CEA 7.2 on 10/22/2018. CT chest 11/02/2018 stable 3 mm nodule within LUCY. No new right and left lung nodule. No mediastinal or osseous lesion. CT abdomen/pelvis 11/02/2018 stable metastatic disease to liver. No new metastatic disease identified. No mesenteric or retroperitoneal LN. No gross colonic lesion identified. Continue FOLFIRI + Avastin and repeat scans in 3 months. Cycle # 58 FOLFIRI + Avastin was on 11/06/2018. CEA 7.6 on 11/05/2018. Cycle # 59 FOLFIRI + Avastin was on 11/27/2018. CEA 6.9 on 11/26/2018. Cycle # 60 FOLFIRI + Avastin was on 12/11/2018. CEA 6.7 on 12/11/2018. Cycle # 61 FOLFIRI + Avastin was on 01/08/2019.  CEA 5.6 on 01/07/2019. Cycle # 62 FOLFIRI + Avastin was on 01/29/2019. CEA 4.6 on 01/28/2019. Cycle # 63 FOLFIRI + Avastin was on 02/12/2019. CEA 4.3 on 02/11/2019. CT chest 02/21/2019 no evidence of metastatic disease. CT abdomen/pelvis 02/21/2019 stable hypodense liver lesions. No evidence of worsening metastatic disease. 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 Ordered MRI thoracic spine and referred to neurosurgery team.  Cycle # 64 FOLFIRI + Avastin was on 02/26/2019. CEA 4.7 on 02/25/2019. Cycle # 65 FOLFIRI + Avastin was on 03/12/2019. CEA 4.0 on 03/11/2019. Cycle # 66 FOLFIRI + Avastin was on 03/26/2019. CEA 4.7 on 03/25/2019. Cycle # 67 FOLFIRI + Avastin was on 04/09/2019. CEA 5.6 on 04/08/2019. Cycle # 68 FOLFIRI + Avastin was on 04/30/2019. CEA 4.9 on 04/29/2019. Cycle # 69 FOLFIRI + Avastin was on 05/14/2019. CEA 5.3 on 05/14/2019. CT chest 05/23/2019 stable small lung nodule with no evidence of metastatic disease. CT abdomen/pelvis 05/23/2019 Decrease conspicuity of the liver lesions. No new lesions seen. Stable splenomegaly. Continue FOLFIRI + Avastin and repeat scans in 3 months. Cycle # 70 FOLFIRI + Avastin was on 06/04/2019. CEA 4.8 on 06/03/2019. Cycle # 71 FOLFIRI + Avastin was on 06/18/2019. CEA 4.6 on 06/17/2019. Cycle # 72 FOLFIRI + Avastin was on 07/09/2019. CEA 4.3 on 07/08/2019. Cycle # 73 FOLFIRI + Avastin was on 07/30/2019. CEA 5.0 on 07/29/2019. Cycle # 74 FOLFIRI + Avastin was on 08/13/2019. CEA 5.0 on 08/12/2019. CT chest 08/20/2019 negative  CT abdomen/pelvis 08/20/2019 Previous identified hepatic lesions are not visualized on the current exam.  Continue FOLFIRI + Avastin and repeat scans in 3 months. Cycle # 75 FOLFIRI + Avastin was on 08/27/2019. CEA 5.0 on 08/26/2019. Cycle # 76 FOLFIRI + Avastin was on 09/17/2019. CEA 5.5 on 09/16/2019. Cycle # 77 FOLFIRI + Avastin was on 10/15/2019. CEA 4.6 on 10/15/2019.   Cycle # 78 FOLFIRI + Avastin was on 10/29/2019. CEA 4.1 on 10/28/2019. Cycle # 79 FOLFIRI + Avastin was on 11/12/2019. CEA 4.3 on 11/12/2019. Cycle # 80 FOLFIRI + Avastin was on 12/10/2019. CEA 4.0 on 12/09/2019. CT chest 12/19/2019 Stable 3 mm nodule within the left upper lobe, similar to the previous studies dating back to 11/2/2018, however decreased in size compared to the CT from 3/26/2018. No thoracic LN. CT abdomen/pelvis 12/19/2019 Previously described small hypodense lesions are more conspicuous on the current study compared to the recent study throughout the liver from 8/20/2019, however similar to the prior study from 2/21/2019. Findings may be related to differences in contrast opacification. No abdominal or pelvic lymphadenopathy. Continue FOLFIRI + Avastin and repeat scans in 2 months to f/u on liver lesions. Cycle # 81 FOLFIRI + Avastin was on 01/07/2020. CEA 3.8 on 01/06/2020. Cycle # 82 FOLFIRI + Avastin was on 01/21/2020. CEA 3.7 on 01/20/2020. Cycle # 83 FOLFIRI + Avastin was on 02/04/2020. CEA 4.1 on 02/03/2020. Cycle # 84 FOLFIRI + Avastin was on 02/18/2020. CEA 4.6 on 02/17/2020. CT chest 2/28/2020 no evidence of metastatic disease to the lungs and no mediastinal or hilar adenopathy. CT abdomen pelvis 2/28/2020 no enhancing lesions seen within the liver to suggest metastatic disease. Wall thickening of the rectosigmoid junction. Images reviewed. Continue FOLFIRI Avastin and repeat scans in 3 months  EGD by Dr. Kassandra Laura 03/02/2020 showing no masses or lesions. Cycle # 85 FOLFIRI + Avastin was on 03/03/2020. CEA 7.4 on 03/02/2020. Cycle # 86 FOLFIRI + Avastin was on 03/17/2020. CEA 4.5 on 03/16/2020. Colonoscopy on 03/23/2020 by Dr. Kassandra Laura unremarkable (records requested). Cycle # 87 FOLFIRI + Avastin was on 03/31/2020. CEA 4.1 on 03/30/2020. Cycle # 88 FOLFIRI + Avastin was on 04/21/2020. CEA 6.4 on 04/20/2020. Cycle # 89 FOLFIRI + Avastin was on 05/05/2020.  CEA 5.8 on 05/04/2020. Cycle # 90 FOLFIRI + Avastin was on 06/02/2020. CEA 5.5 on 06/01/2020. Cycle # 91 FOLFIRI + Avastin was on 06/16/2020. CEA 5.6 on 06/15/2020. CT chest 06/23/2020 noted no metastatic disease in the chest.   CT abdomen/pelvis 06/23/2020 Stable small liver lesions measuring up to 5 mm since 2019.   22 mm round mass in segment 8 of the liver with central high density stable from December 2019), previously measuring up to 34 mm in 2017. No additional metastatic disease in the abdomen or pelvis. Small amount of ascites. Overall stable disease. Continue FOLFIRI + Avastin and repeat scans in 2-3 months. Cycle # 92 FOLFIRI + Avastin was on 07/07/2020. CEA 5.7 on 07/06/2020. Cycle # 93 FOLFIRI + Avastin was on 07/21/2020. CEA 5.9 on 07/20/2020. Cycle # 94 FOLFIRI + Avastin was on 08/04/2020. CEA 5.5 on 08/04/2020. Cycle # 95 FOLFIRI + Avastin was on 08/25/2020. CEA 6.1 on 08/24/2020. CT chest 9/2/2020 stable unremarkable enhanced CT of the thorax. There is no metastatic disease to the lungs. CT abdomen pelvis on 9/2/2020 stable 2.2 cm low attenuated lesion within the central aspect of the right hepatic lobe favored to represent treated metastatic disease. No new hepatic lesion is identified. Stable splenomegaly  There is no appreciable colonic lesion or stricture. Pericholecystic fluid of uncertain etiology. Laparoscopic cholecystectomy with normal cholangiogram 10/27/20  Gallbladder: Chronic cholecystitis and cholelithiasis.  Unremarkable regional lymph node. 11/13/2020; Seen by Dr. Christie Marinelli from General surgery team; cleared to re-start chemotherapy. Cycle # 96 FOLFIRI + Avastin was on 11/17/2020. CEA 3.6 on 11/16/2020. Cycle # 97 FOLFIRI + Avastin was on 12/01/2020. CEA 3.5 on 11/30/2020. Cycle # 98 FOLFIRI + Avastin was on 12/15/2020. CEA 4.3 on 12/15/2020. Cycle # 99 FOLFIRI + Avastin was on 01/05/2021. CEA 4.7 on 01/04/2021.   CT chest 01/13/2021 negative for metastatic disease. CT abdomen/pelvis 01/13/2021 Unchanged central hepatic lesion. No specific signs of abdominopelvic metastatic disease. Continue FOLFIRI + Avastin and repeat scans in 3 months. Cycle # 100 FOLFIRI + Avastin was on 01/19/2021. CEA 4.7 on 01/18/2021. Cycle # 101 FOLFIRI + Avastin was on 02/02/2021. CEA 5.2 on 02/01/2021. Cycle # 102 FOLFIRI + Avastin was on 02/16/2021. CEA 5.6 on 02/15/2021. Cycle # 103 FOLFIRI + Avastin was on 03/02/2021. Cycle # 104 FOLFIRI (20% dose reduced due to significant toxicity) + Avastin was on 03/16/2021. Cycle # 105 FOLFIRI (same dose as cycle # 104) + Avastin was on 03/30/2021. CEA 6.5 on 03/29/2021. Cycle # 106 FOLFIRI (same dose as cycle # 104) + Avastin was on 04/13/2021. CEA 6.0 on 04/12/2021  CT chest 04/20/2021 no evidence of metastatic disease. CT abdomen/pelvis 04/20/2021 No evidence of progressive metastatic disease. Stable 2 cm lesion in the right lobe of the liver, likely representing treated metastatic disease. Imaging reviewed. Continue FOLFIRI + Avastin and repeat scans in 3 months  Cycle # 107 FOLFIRI + Avastin was on 04/27/2021  Cycle # 108 FOLFIRI + Avastin was on 05/11/2021. CEA 6.4 on 05/10/2021. Cycle # 109 FOLFIRI (held Avastin due to upcoming surgery) on 05/25/2021. CEA 6.3 on 5/24/21  Cycle # 110 FOLFIRI is today 06/08/2021. CEA 6.3 on 06/07/2021.     RTC 2 weeks for Cycle #111 FOLFIRI +/- Avastin  MSI testing noted no mismatch repair protein loss of expression    6/8/2021  Carly Vaughn MD  Board Certified Medical Oncologist

## 2021-06-10 ENCOUNTER — HOSPITAL ENCOUNTER (OUTPATIENT)
Dept: INFUSION THERAPY | Age: 71
Discharge: HOME | End: 2021-06-10
Payer: MEDICARE

## 2021-06-10 DIAGNOSIS — C78.7 RECTAL CANCER METASTASIZED TO LIVER (HCC): ICD-10-CM

## 2021-06-10 DIAGNOSIS — C20 RECTAL CANCER METASTASIZED TO LIVER (HCC): ICD-10-CM

## 2021-06-10 DIAGNOSIS — Z51.11 ENCOUNTER FOR ANTINEOPLASTIC CHEMOTHERAPY: Primary | ICD-10-CM

## 2021-06-10 PROCEDURE — 96372 THER/PROPH/DIAG INJ SC/IM: CPT

## 2021-06-10 PROCEDURE — 6360000002 HC RX W HCPCS: Performed by: INTERNAL MEDICINE

## 2021-06-10 RX ADMIN — PEGFILGRASTIM-CBQV 6 MG: 6 INJECTION, SOLUTION SUBCUTANEOUS at 14:37

## 2021-06-15 ENCOUNTER — HOSPITAL ENCOUNTER (OUTPATIENT)
Dept: PREADMISSION TESTING | Age: 71
Discharge: HOME | End: 2021-06-15
Payer: MEDICARE

## 2021-06-15 VITALS
DIASTOLIC BLOOD PRESSURE: 57 MMHG | TEMPERATURE: 98.7 F | SYSTOLIC BLOOD PRESSURE: 109 MMHG | WEIGHT: 173.3 LBS | HEART RATE: 61 BPM | HEIGHT: 73 IN | BODY MASS INDEX: 22.97 KG/M2 | OXYGEN SATURATION: 100 % | RESPIRATION RATE: 20 BRPM

## 2021-06-15 DIAGNOSIS — Z01.818 PREOP TESTING: Primary | ICD-10-CM

## 2021-06-15 LAB
ANION GAP SERPL CALCULATED.3IONS-SCNC: 9 MMOL/L (ref 7–16)
BUN BLDV-MCNC: 23 MG/DL (ref 6–23)
CALCIUM SERPL-MCNC: 8.4 MG/DL (ref 8.6–10.2)
CHLORIDE BLD-SCNC: 102 MMOL/L (ref 98–107)
CO2: 24 MMOL/L (ref 22–29)
CREAT SERPL-MCNC: 0.9 MG/DL (ref 0.7–1.2)
EKG ATRIAL RATE: 54 BPM
EKG P AXIS: 64 DEGREES
EKG P-R INTERVAL: 190 MS
EKG Q-T INTERVAL: 500 MS
EKG QRS DURATION: 136 MS
EKG QTC CALCULATION (BAZETT): 474 MS
EKG R AXIS: -25 DEGREES
EKG T AXIS: 12 DEGREES
EKG VENTRICULAR RATE: 54 BPM
GFR AFRICAN AMERICAN: >60
GFR NON-AFRICAN AMERICAN: >60 ML/MIN/1.73
GLUCOSE BLD-MCNC: 125 MG/DL (ref 74–99)
HCT VFR BLD CALC: 22.3 % (ref 37–54)
HEMOGLOBIN: 7 G/DL (ref 12.5–16.5)
MCH RBC QN AUTO: 31 PG (ref 26–35)
MCHC RBC AUTO-ENTMCNC: 31.4 % (ref 32–34.5)
MCV RBC AUTO: 98.7 FL (ref 80–99.9)
PDW BLD-RTO: 16.9 FL (ref 11.5–15)
PLATELET # BLD: 59 E9/L (ref 130–450)
PLATELET CONFIRMATION: NORMAL
PMV BLD AUTO: 10.7 FL (ref 7–12)
POTASSIUM REFLEX MAGNESIUM: 4 MMOL/L (ref 3.5–5)
RBC # BLD: 2.26 E12/L (ref 3.8–5.8)
SODIUM BLD-SCNC: 135 MMOL/L (ref 132–146)
WBC # BLD: 4.3 E9/L (ref 4.5–11.5)

## 2021-06-15 PROCEDURE — 80048 BASIC METABOLIC PNL TOTAL CA: CPT

## 2021-06-15 PROCEDURE — 36415 COLL VENOUS BLD VENIPUNCTURE: CPT

## 2021-06-15 PROCEDURE — 85027 COMPLETE CBC AUTOMATED: CPT

## 2021-06-15 PROCEDURE — 93005 ELECTROCARDIOGRAM TRACING: CPT | Performed by: ANESTHESIOLOGY

## 2021-06-15 ASSESSMENT — PAIN DESCRIPTION - PAIN TYPE: TYPE: CHRONIC PAIN

## 2021-06-15 ASSESSMENT — PAIN SCALES - GENERAL: PAINLEVEL_OUTOF10: 6

## 2021-06-15 ASSESSMENT — PAIN DESCRIPTION - ONSET: ONSET: ON-GOING

## 2021-06-15 ASSESSMENT — PAIN DESCRIPTION - DESCRIPTORS: DESCRIPTORS: ACHING

## 2021-06-15 ASSESSMENT — PAIN DESCRIPTION - LOCATION: LOCATION: BACK

## 2021-06-15 ASSESSMENT — PAIN DESCRIPTION - ORIENTATION: ORIENTATION: LOWER

## 2021-06-15 NOTE — PROGRESS NOTES
[id number] [facility]                                                                                                                    PRE OP INSTRUCTIONS FOR  Irving Clark        Date: 6/15/2021    Date of surgery: 6/21/21  Arrival Time: Hospital will call you between 5pm and 7pm Friday night with your final arrival time for surgery    1. Do not eat or drink anything after midnight prior to surgery. This includes no water, chewing gum, mints or ice chips. 2. Take the following medications with a small sip of water on the morning of Surgery: Tamsulosin (Flomax)     3. Diabetics may take evening dose of insulin but none after midnight. If you feel symptomatic or low blood sugar morning of surgery drink 1-2 ounces of apple juice only. 4. Aspirin, Ibuprofen, Advil, Naproxen, Vitamin E and other Anti-inflammatory products should be stopped  before surgery  as directed by your physician. Take Tylenol only unless instructed otherwise by your surgeon. 5. Check with your Doctor regarding stopping Plavix, Coumadin, Lovenox, Eliquis, Effient, or other blood thinners. 6. Do not smoke,use illicit drugs and do not drink any alcoholic beverages 24 hours prior to surgery. 7. You may brush your teeth the morning of surgery. DO NOT SWALLOW WATER    8. You MUST make arrangements for a responsible adult to take you home after your surgery. You will not be allowed to leave alone or drive yourself home. It is strongly suggested someone stay with you the first 24 hrs. Your surgery will be cancelled if you do not have a ride home. 9. PEDIATRIC PATIENTS ONLY:  A parent/legal guardian must accompany a child scheduled for surgery and plan to stay at the hospital until the child is discharged. Please do not bring other children with you.     10. Please wear simple, loose fitting clothing to the hospital.  Do not bring valuables (money, credit cards, checkbooks, etc.) Do not wear any makeup (including no eye makeup) or nail polish on your fingers or toes. 11. DO NOT wear any jewelry or piercings on day of surgery. All body piercing jewelry must be removed. 12. Shower the night before surgery with __x_Antibacterial soap /KULDEEP WIPES________    13. TOTAL JOINT REPLACEMENT/HYSTERECTOMY PATIENTS ONLY---Remember to bring Blood Bank bracelet to the hospital on the day of surgery. 14. If you have a Living Will and Durable Power of  for Healthcare, please bring in a copy. 15. If appropriate bring crutches, inspirex, WALKER, CANE etc... 12. Notify your Surgeon if you develop any illness between now and surgery time, cough, cold, fever, sore throat, nausea, vomiting, etc.  Please notify your surgeon if you experience dizziness, shortness of breath or blurred vision between now & the time of your surgery. 17. If you have ___dentures, they will be removed before going to the OR; we will provide you a container. If you wear ___contact lenses or _x__glasses, they will be removed; please bring a case for them. 18. To provide excellent care visitors will be limited to 1 in the room at any given time. 19. Please bring picture ID and insurance card. 20. Sleep apnea patients need to bring CPAP AND SETTINGS to hospital on day of surgery. 21. During flu season no children under the age of 15 are permitted in the hospital for the safety of all patients. 22. Other                  Please call AMBULATORY CARE if you have any further questions.    [address] [facility]     [address]

## 2021-06-18 ENCOUNTER — TELEPHONE (OUTPATIENT)
Dept: SURGERY | Age: 71
End: 2021-06-18

## 2021-06-18 NOTE — TELEPHONE ENCOUNTER
Patient phoned the office to inform us that his surgery will need to be cancelled. Patient has not been cleared by his PCP for surgery. Patient will call back to reschedule once he has been cleared by his PCP. Call placed to surgery scheduling to cancel surgery. Call placed to PAT to inform them that procedure has been cancelled. Patients post op follow up appointment has also been canceled.   Electronically signed by Tri Ruelas on 6/18/21 at 1:51 PM EDT

## 2021-06-21 ENCOUNTER — HOSPITAL ENCOUNTER (OUTPATIENT)
Age: 71
Setting detail: SPECIMEN
Discharge: HOME | End: 2021-06-21
Payer: MEDICARE

## 2021-06-21 LAB
ABO/RH: NORMAL
ANTIBODY SCREEN: NORMAL

## 2021-06-21 PROCEDURE — 36415 COLL VENOUS BLD VENIPUNCTURE: CPT

## 2021-06-21 PROCEDURE — 86900 BLOOD TYPING SEROLOGIC ABO: CPT

## 2021-06-21 PROCEDURE — 86923 COMPATIBILITY TEST ELECTRIC: CPT

## 2021-06-21 PROCEDURE — 86850 RBC ANTIBODY SCREEN: CPT

## 2021-06-21 PROCEDURE — 86901 BLOOD TYPING SEROLOGIC RH(D): CPT

## 2021-06-22 ENCOUNTER — HOSPITAL ENCOUNTER (OUTPATIENT)
Dept: PREOP | Age: 71
Discharge: HOME | End: 2021-06-22
Payer: MEDICARE

## 2021-06-22 VITALS
SYSTOLIC BLOOD PRESSURE: 122 MMHG | TEMPERATURE: 97.1 F | RESPIRATION RATE: 16 BRPM | OXYGEN SATURATION: 100 % | HEART RATE: 55 BPM | DIASTOLIC BLOOD PRESSURE: 65 MMHG

## 2021-06-22 PROCEDURE — P9016 RBC LEUKOCYTES REDUCED: HCPCS

## 2021-06-22 PROCEDURE — 36430 TRANSFUSION BLD/BLD COMPNT: CPT

## 2021-06-22 PROCEDURE — 2580000003 HC RX 258: Performed by: INTERNAL MEDICINE

## 2021-06-22 RX ORDER — SODIUM CHLORIDE 9 MG/ML
INJECTION, SOLUTION INTRAVENOUS PRN
Status: DISCONTINUED | OUTPATIENT
Start: 2021-06-22 | End: 2021-06-23 | Stop reason: HOSPADM

## 2021-06-22 RX ORDER — SODIUM CHLORIDE 0.9 % (FLUSH) 0.9 %
5-40 SYRINGE (ML) INJECTION 2 TIMES DAILY
Status: DISCONTINUED | OUTPATIENT
Start: 2021-06-22 | End: 2021-06-23 | Stop reason: HOSPADM

## 2021-06-22 RX ADMIN — SODIUM CHLORIDE, PRESERVATIVE FREE 10 ML: 5 INJECTION INTRAVENOUS at 08:48

## 2021-06-22 NOTE — PROGRESS NOTES
6/22/21 1200 reviewed discharge instructions with pt.  Pt verbalized understanding, signed in agreement and given copy. clara delaney

## 2021-06-24 ENCOUNTER — TELEPHONE (OUTPATIENT)
Dept: SURGERY | Age: 71
End: 2021-06-24

## 2021-06-24 NOTE — TELEPHONE ENCOUNTER
Patient called and stated that he was cleared for surgery by PCP. I called Dr Regino Lawrence office, she stated that he is not cleared for surgery. He was referred to Dr Magdalena Bhagat. Dr Jey Garza did clear patient but Dr Regino Lawrence wanted a blood transfusion. He did get a blood transfusion but Dr Regino Lawrence is out of the office until 6/28/21. I will call back on Monday or Tuesday. I called and left message for patient to call me back. Patient called me back and I explained that I do not have clearance and will call him as soon as I get it. He gave me a verbal understanding.

## 2021-06-28 ENCOUNTER — HOSPITAL ENCOUNTER (OUTPATIENT)
Age: 71
Discharge: HOME | End: 2021-06-28
Payer: MEDICARE

## 2021-06-28 DIAGNOSIS — C20 RECTAL CANCER METASTASIZED TO LIVER (HCC): ICD-10-CM

## 2021-06-28 DIAGNOSIS — C78.7 RECTAL CANCER METASTASIZED TO LIVER (HCC): ICD-10-CM

## 2021-06-28 LAB
ALBUMIN SERPL-MCNC: 3.3 G/DL (ref 3.5–5.2)
ALP BLD-CCNC: 236 U/L (ref 40–129)
ALT SERPL-CCNC: 26 U/L (ref 0–40)
ANION GAP SERPL CALCULATED.3IONS-SCNC: 8 MMOL/L (ref 7–16)
ANISOCYTOSIS: ABNORMAL
AST SERPL-CCNC: 42 U/L (ref 0–39)
BASOPHILS ABSOLUTE: 0 E9/L (ref 0–0.2)
BASOPHILS RELATIVE PERCENT: 0.8 % (ref 0–2)
BILIRUB SERPL-MCNC: 0.6 MG/DL (ref 0–1.2)
BUN BLDV-MCNC: 21 MG/DL (ref 6–23)
CALCIUM SERPL-MCNC: 9.1 MG/DL (ref 8.6–10.2)
CEA: 5.8 NG/ML (ref 0–5.2)
CHLORIDE BLD-SCNC: 109 MMOL/L (ref 98–107)
CO2: 24 MMOL/L (ref 22–29)
CREAT SERPL-MCNC: 1.1 MG/DL (ref 0.7–1.2)
EOSINOPHILS ABSOLUTE: 0.13 E9/L (ref 0.05–0.5)
EOSINOPHILS RELATIVE PERCENT: 2.6 % (ref 0–6)
GFR AFRICAN AMERICAN: >60
GFR NON-AFRICAN AMERICAN: >60 ML/MIN/1.73
GLUCOSE BLD-MCNC: 116 MG/DL (ref 74–99)
HCT VFR BLD CALC: 28 % (ref 37–54)
HEMOGLOBIN: 8.5 G/DL (ref 12.5–16.5)
LYMPHOCYTES ABSOLUTE: 0.4 E9/L (ref 1.5–4)
LYMPHOCYTES RELATIVE PERCENT: 7.8 % (ref 20–42)
MCH RBC QN AUTO: 30.6 PG (ref 26–35)
MCHC RBC AUTO-ENTMCNC: 30.4 % (ref 32–34.5)
MCV RBC AUTO: 100.7 FL (ref 80–99.9)
MONOCYTES ABSOLUTE: 0.1 E9/L (ref 0.1–0.95)
MONOCYTES RELATIVE PERCENT: 1.7 % (ref 2–12)
NEUTROPHILS ABSOLUTE: 4.4 E9/L (ref 1.8–7.3)
NEUTROPHILS RELATIVE PERCENT: 87.8 % (ref 43–80)
OVALOCYTES: ABNORMAL
PDW BLD-RTO: 18.4 FL (ref 11.5–15)
PLATELET # BLD: 110 E9/L (ref 130–450)
PMV BLD AUTO: 11.5 FL (ref 7–12)
POIKILOCYTES: ABNORMAL
POLYCHROMASIA: ABNORMAL
POTASSIUM SERPL-SCNC: 4 MMOL/L (ref 3.5–5)
RBC # BLD: 2.78 E12/L (ref 3.8–5.8)
SODIUM BLD-SCNC: 141 MMOL/L (ref 132–146)
TEAR DROP CELLS: ABNORMAL
TOTAL PROTEIN: 5.8 G/DL (ref 6.4–8.3)
WBC # BLD: 5 E9/L (ref 4.5–11.5)

## 2021-06-28 PROCEDURE — 80053 COMPREHEN METABOLIC PANEL: CPT

## 2021-06-28 PROCEDURE — 82378 CARCINOEMBRYONIC ANTIGEN: CPT

## 2021-06-28 PROCEDURE — 85025 COMPLETE CBC W/AUTO DIFF WBC: CPT

## 2021-06-28 PROCEDURE — 36415 COLL VENOUS BLD VENIPUNCTURE: CPT

## 2021-06-29 ENCOUNTER — OFFICE VISIT (OUTPATIENT)
Dept: ONCOLOGY | Age: 71
End: 2021-06-29
Payer: MEDICARE

## 2021-06-29 ENCOUNTER — HOSPITAL ENCOUNTER (OUTPATIENT)
Dept: INFUSION THERAPY | Age: 71
End: 2021-06-29
Payer: MEDICARE

## 2021-06-29 VITALS
BODY MASS INDEX: 23.52 KG/M2 | WEIGHT: 177.5 LBS | OXYGEN SATURATION: 97 % | SYSTOLIC BLOOD PRESSURE: 121 MMHG | HEIGHT: 73 IN | TEMPERATURE: 98.6 F | DIASTOLIC BLOOD PRESSURE: 61 MMHG | HEART RATE: 69 BPM

## 2021-06-29 DIAGNOSIS — C20 RECTAL CANCER (HCC): Primary | ICD-10-CM

## 2021-06-29 PROCEDURE — 99212 OFFICE O/P EST SF 10 MIN: CPT

## 2021-06-29 PROCEDURE — 99214 OFFICE O/P EST MOD 30 MIN: CPT | Performed by: INTERNAL MEDICINE

## 2021-06-29 NOTE — PROGRESS NOTES
[place] [id number]  Attending Clinic Note     Reason for Visit: Follow-up on a patient with Metastatic Rectal Cancer.     PCP: Shaniqua Mast MD     History of Present Illness:  65 y/o  male who was referred to see Dr. Juan Scott (GI team) for evaluation of bright red blood per rectum, mild anemia and change in bowel habits with diarrhea. CEA 2640 on 10/19/2015. AlcP 299 AST 57 ALT 75 on 10/19/2015. Colonoscopy in 2013 noted no significant polyps, colitis or lesions at that time. Denies any Family History of colorectal cancer or polyps.     Colonoscopy on 10/19/2015 revealed:  1. Ascending polyp, 8 mm, hot snare: Tubulovillous adenoma. 2. Transverse polyp, 1 cm, hot snare: Serrated polyp most consistent with sessile serrated adenoma. 3. Five splenic flexure polyps, three - 5 mm, 7 mm, 8 mm, hot snare and biopsy: Four segments of Tubular Adenoma  4. Descending polyp, 5 mm, biopsy: Tubular adenoma. 5. Sigmoid polyp, 4 mm biopsy, Serrated polyp most consistent with sessile serrated adenoma. 6. Two rectal polyps, 4 mm biopsy: Serrated polyp most consistent with hyperplastic polyp. 7. Rectosigmoid colon mass (large mass approximately 65% circumference of the lumen; Very friable, firm and hard): Tubulovillous adenoma with associated focal erosion and fibroplasia.      CT scan abdomen/pelvis on 10/26/2015:  1. Small nodules at lung bases likely represent metastatic colon   cancer. 2. Extensive likely metastatic colon cancer throughout the liver.      3. Mild mural thickening and luminal narrowing in the   terminal ileum, otherwise nonspecific.      Colonoscopy with snare removal rectal mass was performed by Dr. Mario Sultana. Pathology proved:  Rectal polyp: Invasive adenocarcinoma involving villous adenoma and extending to the cauterized edge of excision. KRAS Mutation: Mutation detected. BRAF Mutation: Mutation not detected.   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     CEA 3488. Bone scan on 11/10/2015 noted no metastatic disease. CT chest on 11/10/2015 revealed Multiple pulmonary nodules in the upper and lower lobes consistent with metastatic disease;   Hepatic metastasis also visualized. 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 Mediport was placed. Cycle # 1 of FOLFOX + Avastin was on 11/23/2015. CEA was 4555 on 11/23/2015. Cycle # 2 of FOLFOX + Avastin was on 12/08/2015. CEA was 3160 on 12/08/2015. Cycle # 3 of FOLFOX + Avastin was on 12/22/2015. CEA was 2516 on 12/21/2015. Cycle # 4 of FOLFOX + Avastin was on 01/05/2016. CEA was 2098 on 01/05/2015. Cycle # 5 of FOLFOX + Avastin was on 01/19/2016. CEA was 1511 on 01/05/2015.     -Bone scan on 01/26/2016 noted no metastatic disease. CT chest on 01/26/2016 revealed Significant response to treatment with no visible residual nodules. CT scan abdomen/pelvis on 01/26/2016 noted Interval decreased size of multiple masses in the liver compatible with treatment response. Continue another 2 months of FOLFOX + Avastin and repeat scans. Cycle # 6 of FOLFOX + Avastin was on 02/02/2016.  on 02/02/2016. Cycle # 7 of FOLFOX + Avastin was on 02/16/2016.  on 02/16/2016. Cycle # 8 of FOLFOX + Avastin was on 03/01/2016.  on 03/01/2016. Cycle # 9 of FOLFOX + Avastin was on 03/15/2016.  on 03/15/2016. Cycle # 10 of FOLFOX + Avastin was on 03/29/2016. .4 on 03/29/2016. Cycle # 11 of FOLFOX + Avastin was on 04/12/2016. .4 on 04/12/2016.     Re-staging scans on 04/19/2016: CT Chest: clear lungs; no evidence of recurring pulmonary nodule; CT Abdomen/Pelvis: Further interval decrease in size of the multiple metastatic hepatic lesions, the largest lesion now measures 3.9 x 3.5 cm and previously measured 4.5 cm in maximum diameter.  No mesenteric lymphadenopathy is identified; Bone Scan: No evidence of osseous metastasis. Continue same regimen and re-stage in 2-3 months. Cycle # 12 FOLFOX + Avastin was on 04/26/2016. .5 on 04/26/2016. Cycle # 13 FOLFOX + Avastin was on 05/10/2016. .3 on 05/10/2016. Cycle # 14 FOLFOX+AVASTIN was on 05/24/2016. .5 on 05/24/2016. Cycle # 15 FOLFOX + Avastin was on 06/07/2016. CEA 90.5 on 06/07/2016. Admitted to [facility] 06/13/2016-06/16/2016 for abdominal pain: EGD noted 1.5 cm clean based duodenal bulb ulceration s/p epinephrine and bicap per Dr. Tatum Alarcon. No active bleeding. A. Stomach, biopsy: Mild chronic gastritis, immunostain negative for Helicobacter  B. Esophagus, biopsy: Gastric glandular mucosa with prominent intestinal metaplasia (Madrid's epithelium), negative for epithelial dysplasia, esophageal squamous mucosa not identified  Cycle # 16 FOLFOX (discontinued avastin (bevacizumab) given association of peptic ulcer disease and known association of GI perforation) was on 06/21/2016. Cycle # 17 FOLFOX was on 07/05/2016. CEA 52.6 on 07/19/2016. Cycle # 17 FOLFOX was on 07/05/2016. CEA 52.6 on 07/05/2016. CEA 47.6 on 07/19/2016.     Re-staging scans 07/19/2106: CT Chest negative for metastatic disease. CT Abdomen/Pelvis: Stable hepatic lesions; Question new mural thickening in the cecum. Bone Scan: New Let hip lesion suspicious for bone metastasis.     Increased CEA; new mural thickening in the cecum and new left hip lesion suspicious for bone metastasis are consistent with disease progression; He derived maximum benefit from FOLFOX/AVASTIN. D/C FOLFOX/AVASTIN. We recommended FOLFIRI second line therapy. Cycle # 1 FOLFIRI was on 08/02/2016. CEA 40.8 on 08/02/2016. Xgeva q4 weeks started on 08/02/2016. Cycle # 2 FOLFIRI was on 08/16/2016. CEA 31.7 on 08/16/2016. Cycle # 3 FOLFIRI (added Avastin) was on 08/30/2016 given that ulcers healed on EGD 08/15/2016 by Dr. Domi Gasca; Protonix bid since avastin re-started.    Colonoscopy on 08/29/2016 (to look at the cecal area) unremarkable. CEA 26.7 on 08/30/2016. Cycle # 4 FOLFIRI/Avastin was on 09/13/2016. CEA 23.4 on 09/13/2016. Cycle # 5 FOLFIRI/Avastin was on 09/27/2016. CEA 22.3 on 09/27/2016. Cycle # 6 FOLFIRI/Avastin was on 10/11/2016. CEA 18.4 on 10/11/2016.      Bone scan 10/21/2016 stable bone metastasis; ? New lesion anterior left sixth rib likely interval healing fracture. CT abdomen/pelvis revealed stable hepatic metastasis. CT chest negative for metastatic disease. Continue FOLFIRI/Avastin and repeat scans in 3 months. Cycle # 7 FOLFIRI/Avastin was on 10/25/2016. CEA 16.1  Cycle # 8 FOLFIRI/Avastin was on 11/08/2016. CEA 15.7  Cycle # 9 FOLFIRI/Avastin was on 11/29/2016. CEA 13.6 on 11/29/2016. Cycle # 10 FOLFIRI/Avastin was on 12/13/2016. CEA 10.6 on 12/13/2016. Cycle # 11 FOLFIRI/Avastin was on 12/27/2016. CEA 11.1 on 12/27/2016. Cycle # 12 FOLFIRI/Avastin was on 01/10/2017. CEA 9.7 on 01/10/2017.       CT chest 01/23/2017 No new pulmonary nodule or lymphadenopathy. Patchy groundglass opacities within the left lower lobe, suggestive of infectious or inflammatory etiology. Followup CT chest in 3 months. Slight increased linear sclerosis along the left anterior sixth rib corresponding to an area of increased uptake on the prior bone scan from 10/21/2016, favored to be related to interval healing changes of a nondisplaced fracture. CT abdomen/pelvis on 01/23/2017 Redemonstration of multiple hepatic metastases, which are similar compared to the prior CT from 10/21/2016. Redemonstration of area of increased sclerosis along the right acetabulum suspicious for metastatic disease. Bone scan on 01/23/2017 Stable subtle uptake within the left proximal diaphysis, again suggestive of metastatic disease  Decreased subtle uptake within the medial right acetabulum.    Decreased uptake within the left anterior sixth rib corresponding to linear sclerosis on the recent CT, favored to be related to an joint rib fracture. Continue FOLFIRI + Avastin and repeat scans in 3 months. Cycle # 13 FOLFIRI + Avastin was on 01/24/2017. Cycle # 14 FOLFIRI + Avastin was on 02/07/2017. Cycle # 15 FOLFIRI + Avastin was on 02/21/2017. Cycle # 16 FOLFIRI + Avastin was on 03/07/2017. Cycle # 17 FOLFIRI + Avastin was on 03/21/2017. Cycle # 18 FOLFIRI + Avastin was on 04/04/2017. CT chest 04/17/2017 negative for metastatic disease. CT abdomen/pelvis 04/17/2017 Stable hypodense metastatic lesions within the liver. Stable area of increased sclerosis along the right acetabulum  Bone scan 04/17/2017 Stable subtle uptake within the left proximal femoral diaphysis; No definite abnormal uptake in the region of a sclerotic lesion along the posterior column of the right acetabulum noted on CT. Stable slight uptake within the left anterior sixth rib corresponding to linear sclerosis on the recent CT, favored to be related to a fracture. Continue FOLFIRI + Avastin and repeat scans in 3 months. Cycle # 19 FOLFIRI + Avastin was on 04/18/2017. Cycle # 20 FOLFIRI + Avastin was on 05/02/2017. Cycle # 21 FOLFIRI + Avastin was on 05/16/2017. Cycle # 22 FOLFIRI + Avastin was on 05/30/2017. Cycle # 23 FOLFIRI + Avastin was on 06/13/2017. Cycle # 24 FOLFIRI + Avastin was on 06/27/2017. Cycle # 25 FOLFIRI + Avastin was on 07/11/2017. Cycle # 26 FOLFIRI + Avastin was on 07/25/2017. Cycle # 27 FOLFIRI + Avastin was on 08/08/2017. Cycle # 28 FOLFIRI + Avastin was on 08/22/2017. Cycle # 29 FOLFIRI + Avastin was on 09/05/2017. Cycle # 30 FOLFIRI + Avastin was on 09/19/2017. Bone scan 09/26/2017 stable. CT abdomen/pelvis on 09/26/2017 Stable hypodense mass in the liver. Stable sclerotic lesion in the acetabulum. CT chest 09/26/2017 negative for metastatic disease. Cycle # 31 FOLFIRI + Avastin was on 10/03/2017. CEA 7.4 on 10/03/2017. Cycle # 32 FOLFIRI + Avastin was on 10/17/2017. CEA 6.0 on 10/17/2017.   Cycle # 33 FOLFIRI + Avastin was on 10/31/2017. CEA 6.8 on 10/31/2017. Cycle # 34 FOLFIRI + Avastin was on 11/14/2017. CEA 7.2 on 11/14/2017. Cycle # 35 FOLFIRI + Avastin was on 11/28/2017. CEA 5.1 on 11/28/2017. Cycle # 36 FOLFIRI + Avastin was on 12/12/2017. CEA 6.1 on 12/12/2017. Bone scan 12/22/2017 noted no evidence of metastatic disease to the axial or appendicular skeleton. CT chest 12/22/2017 noted no evidence of metastatic disease. CT abdomen/pelvis 12/22/2017 noted stable hypodense lesions in the liver. Continue FOLFIRI + Avastin and repeat scans in 3 months. Cycle # 37 FOLFIRI + Avastin was on 12/27/2018. Cycle # 38 FOLFIRI + Avastin was on 01/09/2018. Cycle # 39 FOLFIRI + Avastin was on 01/23/2018. Cycle # 40 FOLFIRI + Avastin was on 02/06/2018. Cycle # 41 FOLFIRI + Avastin was on 02/20/2018. Cycle # 42 FOLFIRI + Avastin was on 03/06/2018. Cycle # 43 FOLFIRI + Avastin was on 03/20/2018. Bone scan on 03/26/2018 negative for metastatic disease. CT chest 03/26/2018 noted ? new 7 mm nodule in LUCY. CT abdomen/pelvis 03/26/2018 noted stable hypodense lesions in the liver. ? New small ascites in the abdomen. Patient wants to continue to monitor the lung finding and repeat scans in 2 months instead of changing the current regimen into oral Lonsurf. Cycle # 44 FOLFIRI + Avastin was on 04/03/2018. CEA 6.3 on 04/03/2018. Cycle # 45 FOLFIRI + Avastin was on 04/24/2018. CEA 5.3 on 04/24/2018. Cycle # 46 FOLFIRI + Avastin was on 05/08/2018. CEA 5.4 on 05/08/2018. Cycle # 47 FOLFIRI + Avastin was on 05/22/2018. CEA 6.1 on 05/22/2018. CT chest 05/31/2018 noted stable nodule in LUCY. No evidence of worsening malignancy. CT abdomen/pelvis on 05/31/2018 noted stable liver metastasis. No evidence of worsening malignancy. Continue FOLFIRI + Avastin and repeat scans in 3 months. Cycle # 48 FOLFIRI + Avastin was on 06/06/2018. Cycle # 49 FOLFIRI + Avastin was on 06/19/2018.    Cycle # 50 FOLFIRI + Avastin was on 07/03/2018. Cycle # 51 FOLFIRI + Avastin was on 07/24/2018. Cycle # 52 FOLFIRI + Avastin was on 08/14/2018. Cycle # 53 FOLFIRI + Avastin was on 08/28/2018. CT chest 09/06/2018 noted interval decrease in size of LUCY measuring up to 4 mm, suggestive of treatment response. No mediastinal or hilar LN  CT abdomen/pelvis 09/06/2018 Slight interval increase in size of a previously noted metastatic lesion within the right hepatic lobe. This may be related to differences in phase of enhancement compared to the most recent study from 5/31/2018, the lesion remains decreased in size compared to the more previous studies. No abdominal, retroperitoneal, or pelvic lymphadenopathy. Continue FOLFIRI + Avastin and repeat scans in 2 months. Cycle # 54 FOLFIRI + Avastin was on 09/11/2018. Cycle # 55 FOLFIRI + Avastin was on 09/25/2018. Cycle # 56 FOLFIRI + Avastin was on 10/09/2018. Cycle # 57 FOLFIRI + Avastin was on 10/23/2018. CT chest 11/02/2018 stable 3 mm nodule within LUCY. No new right and left lung nodule. No mediastinal or osseous lesion. CT abdomen/pelvis 11/02/2018 stable metastatic disease to liver. No new metastatic disease identified. No mesenteric or retroperitoneal LN. No gross colonic lesion identified. Continue FOLFIRI + Avastin and repeat scans in 3 months. Cycle # 58 FOLFIRI + Avastin was on 11/06/2018. CEA 7.6 on 11/05/2018. Cycle # 59 FOLFIRI + Avastin was on 11/27/2018. CEA 6.9 on 11/26/2018. Cycle # 60 FOLFIRI + Avastin was on 12/11/2018. CEA 6.7 on 12/11/2018. Cycle # 61 FOLFIRI + Avastin was on 01/08/2019. CEA 5.6 on 01/07/2019. Cycle # 62 FOLFIRI + Avastin was on 01/29/2019. CEA 4.6 on 01/28/2019. Cycle # 63 FOLFIRI + Avastin was on 02/12/2019. CEA 4.3 on 02/11/2019. CT chest 02/21/2019 no evidence of metastatic disease. CT abdomen/pelvis 02/21/2019 stable hypodense liver lesions. No evidence of worsening metastatic disease.  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 Ordered MRI thoracic spine and referred to neurosurgery team.    Cycle # 64 FOLFIRI + Avastin was on 02/26/2019. CEA 4.7 on 02/25/2019. Cycle # 65 FOLFIRI + Avastin was on 03/12/2019. CEA 4.0 on 03/11/2019. Cycle # 66 FOLFIRI + Avastin was on 03/26/2019. CEA 4.7 on 03/25/2019. Cycle # 67 FOLFIRI + Avastin was on 04/09/2019. CEA 5.6 on 04/08/2019. Cycle # 68 FOLFIRI + Avastin was on 04/30/2019. CEA 4.9 on 04/29/2019. Cycle # 69 FOLFIRI + Avastin was on 05/14/2019. CEA 5.3 on 05/14/2019. CT chest 05/23/2019 stable small lung nodule with no evidence of metastatic disease. CT abdomen/pelvis 05/23/2019 Decrease conspicuity of the liver lesions. No new lesions seen. Stable splenomegaly. Continue FOLFIRI + Avastin and repeat scans in 3 months. Cycle # 70 FOLFIRI + Avastin was on 06/04/2019. CEA 4.8 on 06/03/2019. Cycle # 71 FOLFIRI + Avastin was on 06/18/2019. CEA 4.6 on 06/17/2019. Cycle # 72 FOLFIRI + Avastin was on 07/09/2019. CEA 4.3 on 07/08/2019. Cycle # 73 FOLFIRI + Avastin was on 07/30/2019. CEA 5.0 on 07/29/2019. Cycle # 74 FOLFIRI + Avastin was on 08/13/2019. CEA 5.0 on 08/12/2019. CT chest 08/20/2019 negative  CT abdomen/pelvis 08/20/2019 Previous identified hepatic lesions are not visualized on the current exam.  Continue FOLFIRI + Avastin and repeat scans in 3 months. Cycle # 75 FOLFIRI + Avastin was on 08/27/2019. CEA 5.0 on 08/26/2019. Cycle # 76 FOLFIRI + Avastin was on 09/17/2019. CEA 5.5 on 09/16/2019. Cycle # 77 FOLFIRI + Avastin was on 10/15/2019. CEA 4.6 on 10/15/2019. Cycle # 78 FOLFIRI + Avastin was on 10/29/2019. CEA 4.1 on 10/28/2019. Cycle # 79 FOLFIRI + Avastin was on 11/12/2019. CEA 4.3 on 11/12/2019. Cycle # 80 FOLFIRI + Avastin was on 12/10/2019. CEA 4.0 on 12/09/2019.   CT chest 12/19/2019 Stable 3 mm nodule within the left upper lobe, similar to the previous studies dating back to 11/2/2018, however decreased in size Cycle # 93 FOLFIRI + Avastin was on 07/21/2020. CEA 5.9 on 07/20/2020. Cycle # 94 FOLFIRI + Avastin was on 08/04/2020. CEA 5.5 on 08/04/2020. Cycle # 95 FOLFIRI + Avastin was on 08/25/2020. CEA 6.1 on 08/24/2020. CT chest 9/2/2020 stable unremarkable enhanced CT of the thorax. There is no metastatic disease to the lungs. CT abdomen pelvis on 9/2/2020 stable 2.2 cm low attenuated lesion within the central aspect of the right hepatic lobe favored to represent treated metastatic disease. No new hepatic lesion is identified. Stable splenomegaly  There is no appreciable colonic lesion or stricture  Pericholecystic fluid of uncertain etiology. General surgery team consulted. Laparoscopic cholecystectomy with normal cholangiogram 10/27/20  Gallbladder: Chronic cholecystitis and cholelithiasis.  Unremarkable regional lymph node. 11/13/2020; Seen by Dr. Vilma Peoples from General surgery team; cleared to re-start chemotherapy. Cycle # 96 FOLFIRI + Avastin was on 11/17/2020. CEA 3.6 on 11/16/2020. Cycle # 97 FOLFIRI + Avastin was on 12/01/2020. CEA 3.5 on 11/30/2020. Cycle # 98 FOLFIRI + Avastin was on 12/15/2020. CEA 4.3 on 12/15/2020. Cycle # 99 FOLFIRI + Avastin was on 01/05/2021. CEA 4.7 on 01/04/2021. CT chest 01/13/2021 negative for metastatic disease. CT abdomen/pelvis 01/13/2021 Unchanged central hepatic lesion. No specific signs of abdominopelvic metastatic disease. Continue FOLFIRI + Avastin and repeat scans in 3 months. Cycle # 100 FOLFIRI + Avastin was on 01/19/2021. CEA 4.7 on 01/18/2021. Cycle # 101 FOLFIRI + Avastin was on 02/02/2021. CEA 5.2 on 02/01/2021. Cycle # 102 FOLFIRI + Avastin was on 02/16/2021. CEA 5.6 on 02/15/2021. Cycle # 103 FOLFIRI + Avastin was on 03/02/2021. Cycle # 104 FOLFIRI (20% dose reduced due to significant toxicity) + Avastin was on 03/16/2021. Cycle # 105 FOLFIRI (same dose as cycle # 104) + Avastin was on 03/30/2021. CEA 6.5 on 03/29/2021.   Cycle # 106 FOLFIRI (same dose as cycle # 104) + Avastin was on 04/13/2021. CEA 6.0 on 04/12/2021  CT chest 04/20/2021 no evidence of metastatic disease. CT abdomen/pelvis 04/20/2021 No evidence of progressive metastatic disease. Stable 2 cm lesion in the right lobe of the liver, likely representing treated metastatic disease. Imaging reviewed. Continue FOLFIRI + Avastin and repeat scans in 3 months  Cycle # 107 FOLFIRI + Avastin was on 04/27/2021  Cycle # 108 FOLFIRI + Avastin was on 05/11/2021. Cycle # 109 FOLFIRI (held Avastin due to upcoming surgery) on 05/25/2021. CEA 6.3 on 5/24/21  Cycle # 110 FOLFIRI (held Avastin due to upcoming surgery) on 06/08/2021. CEA 6.3 on 6/07/21. Today 06/29/2021 for f/u. No fever, chills. Improved fatigue s/p prbc transfusion. No nausea/vomiting. No diarrhea    Review of Systems;  CONSTITUTIONAL: No fever, chills. Fair appetite. Improved fatigue s/p prbc transfusion. ENMT: Eyes: No diplopia; Nose: No epistaxis. Mouth:No lesions  RESPIRATORY: No hemoptysis, shortness of breath. CARDIOVASCULAR: No chest pain, palpitations. GASTROINTESTINAL:No N/V. Hernia surgery by Dr. Cody Carlos: No dysuria, urinary frequency, hematuria. NEURO: No syncope, presyncope, headache. Remainder ROS: Negative. Past Medical History:   Past Medical History               Diagnosis Date    Arthritis      Cancer ([facility] [address]         colon    Depression      Hyperlipidemia      Hypertension           Medications:  Reviewed and reconciled.     Allergies: Allergies   Allergen Reactions    Neosporin [Neomycin-Polymyxin-Gramicidin] Rash    Tape Arvie Saskia Tape] Rash      Physical Exam:  /61   Pulse 69   Temp 98.6 °F (37 °C)   Ht 6' 1\" (1.854 m)   Wt 177 lb 8 oz (80.5 kg)   SpO2 97%   BMI 23.42 kg/m²   GENERAL: Alert, oriented x 3, not in distress  HEENT: PERRLA, EOMI. NECK: Supple. Without lymphadenopathy. LUNGS: Lungs are CTA bilaterally, with no wheezing, crackles or rhonchi. CARDIOVASCULAR: Regular rhythm. No murmurs, rubs or gallops. ABDOMEN: Soft. Non-tender, non-distended. Positive bowel sounds. hernia  EXTREMITIES: Without clubbing, cyanosis or edema. Skin toxicity in palms  NEUROLOGIC: No focal deficits. ECOG PS 1    Diagnostics:  Lab Results   Component Value Date    WBC 5.0 06/28/2021    HGB 8.5 (L) 06/28/2021    HCT 28.0 (L) 06/28/2021    .7 (H) 06/28/2021     (L) 06/28/2021     Lab Results   Component Value Date     06/28/2021    K 4.0 06/28/2021     (H) 06/28/2021    CO2 24 06/28/2021    BUN 21 06/28/2021    CREATININE 1.1 06/28/2021    GLUCOSE 116 (H) 06/28/2021    CALCIUM 9.1 06/28/2021    PROT 5.8 (L) 06/28/2021    LABALBU 3.3 (L) 06/28/2021    BILITOT 0.6 06/28/2021    ALKPHOS 236 (H) 06/28/2021    AST 42 (H) 06/28/2021    ALT 26 06/28/2021    LABGLOM >60 06/28/2021    GFRAA >60 06/28/2021     Lab Results   Component Value Date    CEA 5.8 (H) 06/28/2021     Impression/Plan:  67 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 KRAS Mutation: Mutation detected. BRAF Mutation: Mutation not detected. 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 CT scan abdomen/pelvis on 10/26/2015 revealed small nodules at the lung bases, extensive liver lesions consistent with metastatic rectosigmoid cancer. CEA 3488 on 10/30/2015. Bone scan on 11/10/2015 noted no metastatic disease. CT chest on 11/10/2015 revealed Multiple pulmonary nodules in the upper and lower lobes consistent with metastatic disease; Hepatic metastasis also visualized. 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 Mediport was placed. Cycle # 1 of FOLFOX + Avastin was on 11/23/2015. CEA was 4555 on 11/23/2015. Cycle # 2 of FOLFOX + Avastin was on 12/08/2015. CEA was 3160 on 12/08/2015. Cycle # 3 of FOLFOX + Avastin was on 12/22/2015. CEA was 2516 on 12/21/2015.   Cycle # 4 of FOLFOX + Avastin was on mucosa with prominent ntestinal metaplasia (Madrid's epithelium), negative for epithelial dysplasia, esophageal squamous mucosa not identified     Cycle # 16 FOLFOX (discontinued avastin (bevacizumab) given association of peptic ulcer disease and known association of GI perforation.) was on 06/21/2016. CEA 47 on 06/21/2016. Cycle # 17 FOLFOX was on 07/05/2016. CEA 52.6 on 07/05/2016. CEA 47.6 on 07/19/2016.     Re-staging scans 07/19/2106: CT Chest negative for metastatic disease. CT Abdomen/Pelvis: Stable hepatic lesions; Question new mural thickening in the cecum. Bone Scan: New Let hip lesion suspicious for bone metastasis.     Increased CEA; new mural thickening in the cecum and new left hip lesion suspicious for bone metastasis are consistent with disease progression; He derived maximum benefit from FOLFOX/AVASTIN. D/C FOLFOX/AVASTIN. We recommended FOLFIRI second line therapy. Cycle # 1 FOLFIRI was on 08/02/2016. CEA 40.8 on 08/02/2016. Xgeva q4 weeks started on 08/02/2016. Cycle # 2 FOLFIRI was on 08/16/2016. CEA 31.7 on 08/16/2016. Cycle # 3 FOLFIRI (added Avastin) was on 08/30/2016 given that ulcers healed on EGD 08/15/2016 by Dr. Jacques Trejo; Protonix bid since avastin re-started. Colonoscopy on 08/29/2016 (to look at the cecal area) unremarkable. CEA 26.7 on 08/30/2016. Cycle # 4 FOLFIRI/Avastin was on 09/13/2016. CEA 23.4 on 09/13/2016. Cycle # 5 FOLFIRI/Avastin was on 09/27/2016. CEA 22.3 on 09/27/2016. Cycle # 6 FOLFIRI/Avastin was on 10/11/2016. CEA 18.4 on 10/11/2016.      Bone scan 10/21/2016 stable bone metastasis; ? New lesion anterior left sixth rib likely interval healing fracture. CT abdomen/pelvis revealed stable hepatic metastasis. CT chest negative for metastatic disease. Continue FOLFIRI/Avastin and repeat scans in 3 months. Cycle # 7 FOLFIRI/Avastin was on 10/25/2016. CEA 16.1 on 10/25/2016. Cycle # 8 FOLFIRI/Avastin was on 11/08/2016. CEA 15.7 on 11/08/2016.    Cycle # 9 FOLFIRI/Avastin was on 11/29/2016. CEA 13.6 on 11/29/2016. Cycle # 10 FOLFIRI/Avastin was on 12/13/2016. CEA 10.6 on 12/13/2016. Cycle # 11 FOLFIRI/Avastin was on 12/27/2016. CEA 11.1 on 12/27/2016. Cycle # 12 FOLFIRI/Avastin was on 01/10/2017. CEA 9.7 on 01/10/2017. CT chest 01/23/2017 No new pulmonary nodule or lymphadenopathy. Patchy groundglass opacities within the left lower lobe, suggestive of infectious or inflammatory etiology. Followup CT chest in 3 months. Slight increased linear sclerosis along the left anterior sixth rib corresponding to an area of increased uptake on the prior bone scan from 10/21/2016, favored to be related to interval healing changes of a nondisplaced fracture. CT abdomen/pelvis on 01/23/2017 Redemonstration of multiple hepatic metastases, which are similar compared to the prior CT from 10/21/2016. Redemonstration of area of increased sclerosis along the right acetabulum suspicious for metastatic disease. Bone scan on 01/23/2017 Stable subtle uptake within the left proximal diaphysis, again suggestive of metastatic disease  Decreased subtle uptake within the medial right acetabulum. Decreased uptake within the left anterior sixth rib corresponding to linear sclerosis on the recent CT, favored to be related to an joint rib fracture. Continue FOLFIRI + Avastin and repeat scans in 3 months. Cycle # 13 FOLFIRI + Avastin was on 01/24/2017. Cycle # 14 FOLFIRI + Avastin was on 02/07/2017. Cycle # 15 FOLFIRI + Avastin was on 02/21/2017. Cycle # 16 FOLFIRI + Avastin was on 03/07/2017. Cycle # 17 FOLFIRI + Avastin was on 03/21/2017. Cycle # 18 FOLFIRI + Avastin was on 04/04/2017. CT chest 04/17/2017 negative for metastatic disease. CT abdomen/pelvis 04/17/2017 Stable hypodense metastatic lesions within the liver.  Stable area of increased sclerosis along the right acetabulum  Bone scan 04/17/2017 Stable subtle uptake within the left proximal femoral diaphysis; 7.2 on 11/14/2017. Cycle # 35 FOLFIRI + Avastin was on 11/28/2017. CEA 5.1 on 11/28/2017. Cycle # 36 FOLFIRI + Avastin was on 12/12/2017. CEA 6.1 on 12/12/2017. Bone scan 12/22/2017 noted no evidence of metastatic disease to the axial or appendicular skeleton. CT chest 12/22/2017 noted no evidence of metastatic disease. CT abdomen/pelvis 12/22/2017 noted stable hypodense lesions in the liver. Continue FOLFIRI + Avastin and repeat scans in 3 months. Cycle # 37 FOLFIRI + Avastin was on 12/27/2018. CEA 6.7 on 12/27/2017. Cycle # 38 FOLFIRI + Avastin was on 01/09/2018. CEA 5.0 on 01/09/2018. Cycle # 39 FOLFIRI + Avastin was on 01/23/2018. CEA 6.5 on 01/23/2018. Cycle # 40 FOLFIRI + Avastin was on 02/06/2018. CEA 6.9 on 02/06/2018. Cycle # 41 FOLFIRI + Avastin was on 02/20/2018. CEA 6.4 on 02/20/2018. Cycle # 42 FOLFIRI + Avastin was on 03/06/2018. CEA 6.6 on 03/06/2018. Cycle # 43 FOLFIRI + Avastin was on 03/20/2018. CEA 5.7 on 03/20/2018. Bone scan on 03/26/2018 negative for metastatic disease. CT chest 03/26/2018 noted ? new 7 mm nodule in LUCY. CT abdomen/pelvis 03/26/2018 noted stable hypodense lesions in the liver. ? New small ascites in the abdomen. Patient wants to continue to monitor the lung finding and repeat scans in 2 months instead of changing the current chemotherapy regimen to oral Lonsurf. Cycle # 44 FOLFIRI + Avastin was on 04/03/2018. CEA 6.3 on 04/03/2018. Cycle # 45 FOLFIRI + Avastin was on 04/24/2018. CEA 5.3 on 04/24/2018. Cycle # 46 FOLFIRI + Avastin was on 05/08/2018. CEA 5.4 on 05/08/2018. Cycle # 47 FOLFIRI + Avastin was on 05/22/2018. CEA 6.1 on 05/22/2018. CT chest 05/31/2018 noted stable nodule in LUCY. No evidence of worsening malignancy. CT abdomen/pelvis on 05/31/2018 noted stable liver metastasis. No evidence of worsening malignancy. Continue FOLFIRI + Avastin and repeat scans in 3 months. Cycle # 48 FOLFIRI + Avastin was on 06/06/2018.  CEA 6.3 on on 01/28/2019. Cycle # 63 FOLFIRI + Avastin was on 02/12/2019. CEA 4.3 on 02/11/2019. CT chest 02/21/2019 no evidence of metastatic disease. CT abdomen/pelvis 02/21/2019 stable hypodense liver lesions. No evidence of worsening metastatic disease. 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 Ordered MRI thoracic spine and referred to neurosurgery team.  Cycle # 64 FOLFIRI + Avastin was on 02/26/2019. CEA 4.7 on 02/25/2019. Cycle # 65 FOLFIRI + Avastin was on 03/12/2019. CEA 4.0 on 03/11/2019. Cycle # 66 FOLFIRI + Avastin was on 03/26/2019. CEA 4.7 on 03/25/2019. Cycle # 67 FOLFIRI + Avastin was on 04/09/2019. CEA 5.6 on 04/08/2019. Cycle # 68 FOLFIRI + Avastin was on 04/30/2019. CEA 4.9 on 04/29/2019. Cycle # 69 FOLFIRI + Avastin was on 05/14/2019. CEA 5.3 on 05/14/2019. CT chest 05/23/2019 stable small lung nodule with no evidence of metastatic disease. CT abdomen/pelvis 05/23/2019 Decrease conspicuity of the liver lesions. No new lesions seen. Stable splenomegaly. Continue FOLFIRI + Avastin and repeat scans in 3 months. Cycle # 70 FOLFIRI + Avastin was on 06/04/2019. CEA 4.8 on 06/03/2019. Cycle # 71 FOLFIRI + Avastin was on 06/18/2019. CEA 4.6 on 06/17/2019. Cycle # 72 FOLFIRI + Avastin was on 07/09/2019. CEA 4.3 on 07/08/2019. Cycle # 73 FOLFIRI + Avastin was on 07/30/2019. CEA 5.0 on 07/29/2019. Cycle # 74 FOLFIRI + Avastin was on 08/13/2019. CEA 5.0 on 08/12/2019. CT chest 08/20/2019 negative  CT abdomen/pelvis 08/20/2019 Previous identified hepatic lesions are not visualized on the current exam.  Continue FOLFIRI + Avastin and repeat scans in 3 months. Cycle # 75 FOLFIRI + Avastin was on 08/27/2019. CEA 5.0 on 08/26/2019. Cycle # 76 FOLFIRI + Avastin was on 09/17/2019. CEA 5.5 on 09/16/2019. Cycle # 77 FOLFIRI + Avastin was on 10/15/2019. CEA 4.6 on 10/15/2019. Cycle # 78 FOLFIRI + Avastin was on 10/29/2019. CEA 4.1 on 10/28/2019.   Cycle # 79 FOLFIRI + Avastin was on 11/12/2019. CEA 4.3 on 11/12/2019. Cycle # 80 FOLFIRI + Avastin was on 12/10/2019. CEA 4.0 on 12/09/2019. CT chest 12/19/2019 Stable 3 mm nodule within the left upper lobe, similar to the previous studies dating back to 11/2/2018, however decreased in size compared to the CT from 3/26/2018. No thoracic LN. CT abdomen/pelvis 12/19/2019 Previously described small hypodense lesions are more conspicuous on the current study compared to the recent study throughout the liver from 8/20/2019, however similar to the prior study from 2/21/2019. Findings may be related to differences in contrast opacification. No abdominal or pelvic lymphadenopathy. Continue FOLFIRI + Avastin and repeat scans in 2 months to f/u on liver lesions. Cycle # 81 FOLFIRI + Avastin was on 01/07/2020. CEA 3.8 on 01/06/2020. Cycle # 82 FOLFIRI + Avastin was on 01/21/2020. CEA 3.7 on 01/20/2020. Cycle # 83 FOLFIRI + Avastin was on 02/04/2020. CEA 4.1 on 02/03/2020. Cycle # 84 FOLFIRI + Avastin was on 02/18/2020. CEA 4.6 on 02/17/2020. CT chest 2/28/2020 no evidence of metastatic disease to the lungs and no mediastinal or hilar adenopathy. CT abdomen pelvis 2/28/2020 no enhancing lesions seen within the liver to suggest metastatic disease. Wall thickening of the rectosigmoid junction. Images reviewed. Continue FOLFIRI Avastin and repeat scans in 3 months  EGD by Dr. Azalea Porter 03/02/2020 showing no masses or lesions. Cycle # 85 FOLFIRI + Avastin was on 03/03/2020. CEA 7.4 on 03/02/2020. Cycle # 86 FOLFIRI + Avastin was on 03/17/2020. CEA 4.5 on 03/16/2020. Colonoscopy on 03/23/2020 by Dr. Azalea aguero (records requested). Cycle # 87 FOLFIRI + Avastin was on 03/31/2020. CEA 4.1 on 03/30/2020. Cycle # 88 FOLFIRI + Avastin was on 04/21/2020. CEA 6.4 on 04/20/2020. Cycle # 89 FOLFIRI + Avastin was on 05/05/2020. CEA 5.8 on 05/04/2020. Cycle # 90 FOLFIRI + Avastin was on 06/02/2020.  CEA 5.5 on 06/01/2020. Cycle # 91 FOLFIRI + Avastin was on 06/16/2020. CEA 5.6 on 06/15/2020. CT chest 06/23/2020 noted no metastatic disease in the chest.   CT abdomen/pelvis 06/23/2020 Stable small liver lesions measuring up to 5 mm since 2019.   22 mm round mass in segment 8 of the liver with central high density stable from December 2019), previously measuring up to 34 mm in 2017. No additional metastatic disease in the abdomen or pelvis. Small amount of ascites. Overall stable disease. Continue FOLFIRI + Avastin and repeat scans in 2-3 months. Cycle # 92 FOLFIRI + Avastin was on 07/07/2020. CEA 5.7 on 07/06/2020. Cycle # 93 FOLFIRI + Avastin was on 07/21/2020. CEA 5.9 on 07/20/2020. Cycle # 94 FOLFIRI + Avastin was on 08/04/2020. CEA 5.5 on 08/04/2020. Cycle # 95 FOLFIRI + Avastin was on 08/25/2020. CEA 6.1 on 08/24/2020. CT chest 9/2/2020 stable unremarkable enhanced CT of the thorax. There is no metastatic disease to the lungs. CT abdomen pelvis on 9/2/2020 stable 2.2 cm low attenuated lesion within the central aspect of the right hepatic lobe favored to represent treated metastatic disease. No new hepatic lesion is identified. Stable splenomegaly  There is no appreciable colonic lesion or stricture. Pericholecystic fluid of uncertain etiology. Laparoscopic cholecystectomy with normal cholangiogram 10/27/20  Gallbladder: Chronic cholecystitis and cholelithiasis.  Unremarkable regional lymph node. 11/13/2020; Seen by Dr. Blanka Alcala from General surgery team; cleared to re-start chemotherapy. Cycle # 96 FOLFIRI + Avastin was on 11/17/2020. CEA 3.6 on 11/16/2020. Cycle # 97 FOLFIRI + Avastin was on 12/01/2020. CEA 3.5 on 11/30/2020. Cycle # 98 FOLFIRI + Avastin was on 12/15/2020. CEA 4.3 on 12/15/2020. Cycle # 99 FOLFIRI + Avastin was on 01/05/2021. CEA 4.7 on 01/04/2021. CT chest 01/13/2021 negative for metastatic disease. CT abdomen/pelvis 01/13/2021 Unchanged central hepatic lesion.   No specific signs of abdominopelvic metastatic disease. Continue FOLFIRI + Avastin and repeat scans in 3 months. Cycle # 100 FOLFIRI + Avastin was on 01/19/2021. CEA 4.7 on 01/18/2021. Cycle # 101 FOLFIRI + Avastin was on 02/02/2021. CEA 5.2 on 02/01/2021. Cycle # 102 FOLFIRI + Avastin was on 02/16/2021. CEA 5.6 on 02/15/2021. Cycle # 103 FOLFIRI + Avastin was on 03/02/2021. Cycle # 104 FOLFIRI (20% dose reduced due to significant toxicity) + Avastin was on 03/16/2021. Cycle # 105 FOLFIRI (same dose as cycle # 104) + Avastin was on 03/30/2021. CEA 6.5 on 03/29/2021. Cycle # 106 FOLFIRI (same dose as cycle # 104) + Avastin was on 04/13/2021. CEA 6.0 on 04/12/2021  CT chest 04/20/2021 no evidence of metastatic disease. CT abdomen/pelvis 04/20/2021 No evidence of progressive metastatic disease. Stable 2 cm lesion in the right lobe of the liver, likely representing treated metastatic disease. Imaging reviewed. Continue FOLFIRI + Avastin and repeat scans in 3 months  Cycle # 107 FOLFIRI + Avastin was on 04/27/2021  Cycle # 108 FOLFIRI + Avastin was on 05/11/2021. CEA 6.4 on 05/10/2021. Cycle # 109 FOLFIRI (held Avastin due to upcoming surgery) on 05/25/2021. CEA 6.3 on 5/24/21  Cycle # 110 FOLFIRI (held Avastin due to upcoming surgery) on 06/08/2021. CEA 6.3 on 6/07/21. Labs reviewed. CEA 5.8 on 06/28/2021.  Awaiting clearance for surgery  RTC 2 weeks post-op    MSI testing noted no mismatch repair protein loss of expression    6/29/2021  Ludin Quinn MD  Board Certified Medical Oncologist

## 2021-07-09 ENCOUNTER — HOSPITAL ENCOUNTER (OUTPATIENT)
Dept: PREADMISSION TESTING | Age: 71
Discharge: HOME | End: 2021-07-09
Payer: MEDICARE

## 2021-07-09 VITALS
BODY MASS INDEX: 24.65 KG/M2 | SYSTOLIC BLOOD PRESSURE: 121 MMHG | WEIGHT: 186.03 LBS | OXYGEN SATURATION: 99 % | DIASTOLIC BLOOD PRESSURE: 58 MMHG | RESPIRATION RATE: 18 BRPM | HEIGHT: 73 IN | HEART RATE: 67 BPM | TEMPERATURE: 97.6 F

## 2021-07-09 DIAGNOSIS — Z01.818 PRE-OP TESTING: Primary | ICD-10-CM

## 2021-07-09 LAB
ANION GAP SERPL CALCULATED.3IONS-SCNC: 7 MMOL/L (ref 7–16)
BUN BLDV-MCNC: 23 MG/DL (ref 6–23)
CALCIUM SERPL-MCNC: 8.7 MG/DL (ref 8.6–10.2)
CHLORIDE BLD-SCNC: 107 MMOL/L (ref 98–107)
CO2: 25 MMOL/L (ref 22–29)
CREAT SERPL-MCNC: 1 MG/DL (ref 0.7–1.2)
GFR AFRICAN AMERICAN: >60
GFR NON-AFRICAN AMERICAN: >60 ML/MIN/1.73
GLUCOSE BLD-MCNC: 126 MG/DL (ref 74–99)
HCT VFR BLD CALC: 27.8 % (ref 37–54)
HEMOGLOBIN: 8.6 G/DL (ref 12.5–16.5)
MCH RBC QN AUTO: 31.3 PG (ref 26–35)
MCHC RBC AUTO-ENTMCNC: 30.9 % (ref 32–34.5)
MCV RBC AUTO: 101.1 FL (ref 80–99.9)
PDW BLD-RTO: 18 FL (ref 11.5–15)
PLATELET # BLD: 115 E9/L (ref 130–450)
PMV BLD AUTO: 11.4 FL (ref 7–12)
POTASSIUM REFLEX MAGNESIUM: 4.2 MMOL/L (ref 3.5–5)
RBC # BLD: 2.75 E12/L (ref 3.8–5.8)
SODIUM BLD-SCNC: 139 MMOL/L (ref 132–146)
WBC # BLD: 4.1 E9/L (ref 4.5–11.5)

## 2021-07-09 PROCEDURE — 80048 BASIC METABOLIC PNL TOTAL CA: CPT

## 2021-07-09 PROCEDURE — 36415 COLL VENOUS BLD VENIPUNCTURE: CPT

## 2021-07-09 PROCEDURE — 85027 COMPLETE CBC AUTOMATED: CPT

## 2021-07-09 ASSESSMENT — PAIN DESCRIPTION - PAIN TYPE: TYPE: CHRONIC PAIN

## 2021-07-09 ASSESSMENT — PAIN SCALES - GENERAL: PAINLEVEL_OUTOF10: 4

## 2021-07-09 NOTE — PROGRESS NOTES
Dr Kashif Bolaños reviewed CBC. Pt is chronically anemic due to colon cancer treatment. No further orders.   Etelvina Joiner RN  7/9/2021  12:47 PM

## 2021-07-09 NOTE — PROGRESS NOTES
[facility]                                                                                                                    PRE OP INSTRUCTIONS FOR  Elizabeth Abel        Date: 7/9/2021    Date of surgery: 7/13/21   Arrival Time: Hospital will call you between 5pm and 7pm Monday evening with your final arrival time for surgery    1. Do not eat or drink anything after midnight prior to surgery. This includes no water, chewing gum, mints or ice chips. 2. Take the following medications with a small sip of water on the morning of Surgery: protonix, tamsulosin, if needed pain pill     3. Diabetics may take evening dose of insulin but none after midnight. If you feel symptomatic or low blood sugar morning of surgery drink 1-2 ounces of apple juice only. 4. Aspirin, Ibuprofen, Advil, Naproxen, Vitamin E and other Anti-inflammatory products should be stopped  before surgery  as directed by your physician. Take Tylenol only unless instructed otherwise by your surgeon. 5. Check with your Doctor regarding stopping Plavix, Coumadin, Lovenox, Eliquis, Effient, or other blood thinners. 6. Do not smoke,use illicit drugs and do not drink any alcoholic beverages 24 hours prior to surgery. 7. You may brush your teeth the morning of surgery. DO NOT SWALLOW WATER    8. You MUST make arrangements for a responsible adult to take you home after your surgery. You will not be allowed to leave alone or drive yourself home. It is strongly suggested someone stay with you the first 24 hrs. Your surgery will be cancelled if you do not have a ride home. 9. PEDIATRIC PATIENTS ONLY:  A parent/legal guardian must accompany a child scheduled for surgery and plan to stay at the hospital until the child is discharged. Please do not bring other children with you.     10. Please wear simple, loose fitting clothing to the hospital.  Do not bring valuables (money, credit cards, checkbooks, etc.) Do not wear any makeup (including no eye makeup) or nail polish on your fingers or toes. 11. DO NOT wear any jewelry or piercings on day of surgery. All body piercing jewelry must be removed. 12. Shower the night before surgery with _x__Antibacterial soap /KULDEEP WIPES________May use deodorant. No creams lotions or powders from the neck down. 13. TOTAL JOINT REPLACEMENT/HYSTERECTOMY PATIENTS ONLY---Remember to bring Blood Bank bracelet to the hospital on the day of surgery. 14. If you have a Living Will and Durable Power of  for Healthcare, please bring in a copy. 15. If appropriate bring crutches, inspirex, WALKER, CANE etc... 12. Notify your Surgeon if you develop any illness between now and surgery time, cough, cold, fever, sore throat, nausea, vomiting, etc.  Please notify your surgeon if you experience dizziness, shortness of breath or blurred vision between now & the time of your surgery. 17. If you have ___dentures, they will be removed before going to the OR; we will provide you a container. If you wear ___contact lenses or _x__glasses, they will be removed; please bring a case for them. 18. To provide excellent care visitors will be limited to 1 in the room at any given time. 19. Please bring picture ID and insurance card. 20. Sleep apnea patients need to bring CPAP AND SETTINGS to hospital on day of surgery. 21. During flu season no children under the age of 15 are permitted in the hospital for the safety of all patients. 22. Other Please check in at the information desk/main lobby. Please wear a mask. Please call AMBULATORY CARE if you have any further questions.    [address] [facility]     [address]

## 2021-07-12 ENCOUNTER — ANESTHESIA EVENT (OUTPATIENT)
Dept: OPERATING ROOM | Age: 71
End: 2021-07-12
Payer: MEDICARE

## 2021-07-12 NOTE — ANESTHESIA PRE PROCEDURE
Department of Anesthesiology  Preprocedure Note       Name:  Irwin Denver   Age:  66 y.o.  :  1950                                          MRN:  [id number]         Date:  2021      Surgeon: Irene Roberts):  Farida Alejandra MD    Procedure: Procedure(s):  OPEN RIGHT INGUINAL HERNIA REPAIR WITH MESH (CPT 41102)    Medications prior to admission:   Prior to Admission medications    Medication Sig Start Date End Date Taking?  Authorizing Provider   Magic Mouthwash (MIRACLE MOUTHWASH) Swish and swallow 15 mLs 4 times daily as needed for Irritation Equal Amts of Benadryl,Maalox, and Xylocaine 21  Earma Schlatter, APRN - CNP   ferrous gluconate (FERGON) 324 (38 Fe) MG tablet Take 324 mg by mouth daily (with breakfast)    Historical Provider, MD   traMADol (ULTRAM) 50 MG tablet TAKE 1 TABLET BY MOUTH EVERY 4 TO 6 HOURS AS NEEDED FOR PAIN 10/1/20   Historical Provider, MD   tamsulosin (FLOMAX) 0.4 MG capsule Take 2 capsules by mouth daily 10/29/20   Katy Harrison MD   ondansetron (ZOFRAN) 4 MG tablet Take 1 tablet by mouth every 8 hours as needed for Nausea or Vomiting 20   Earma Schlatter, APRN - CNP   lactulose (CHRONULAC) 10 GM/15ML solution Take 20 g by mouth as needed     Historical Provider, MD   furosemide (LASIX) 20 MG tablet Take 20 mg by mouth daily Indications: pt is taken this med 7 days a week for now  20   Historical Provider, MD   magnesium oxide (MAG-OX) 400 (240 Mg) MG tablet Take 1 tablet by mouth 2 times daily  Patient taking differently: Take 400 mg by mouth daily  20   Stacey Benitez MD   potassium chloride (KLOR-CON M) 20 MEQ extended release tablet Take 40 mEq by mouth daily     Historical Provider, MD   loratadine (CLARITIN) 10 MG tablet Take 10 mg by mouth See Admin Instructions Takes the week of chemotherapy (Last dose: ; Next dose: )    Historical Provider, MD   pantoprazole (PROTONIX) 40 MG tablet Take 40 mg by mouth daily    Historical mass    FRACTURE SURGERY      KNEE ARTHROSCOPY Right     LIVER BIOPSY  11/12/15    SKIN FULL GRAFT      on finger    TONSILLECTOMY Bilateral     TUNNELED CENTRAL VENOUS CATHETER W/ SUBCUTANEOUS PORT Left        Social History:    Social History     Tobacco Use    Smoking status: Former Smoker     Packs/day: 1.00     Years: 40.00     Pack years: 40.00     Types: Cigarettes     Quit date: 2001     Years since quittin.4    Smokeless tobacco: Never Used   Substance Use Topics    Alcohol use: No     Alcohol/week: 0.0 standard drinks                                Counseling given: Not Answered      Vital Signs (Current): There were no vitals filed for this visit. BP Readings from Last 3 Encounters:   21 (!) 121/58   21 121/61   21 122/65       NPO Status:                                                                                 BMI:   Wt Readings from Last 3 Encounters:   21 186 lb 0.4 oz (84.4 kg)   21 177 lb 8 oz (80.5 kg)   06/15/21 173 lb 4.8 oz (78.6 kg)     There is no height or weight on file to calculate BMI.    CBC:   Lab Results   Component Value Date    WBC 4.1 2021    RBC 2.75 2021    HGB 8.6 2021    HCT 27.8 2021    .1 2021    RDW 18.0 2021     2021       CMP:   Lab Results   Component Value Date     2021    K 4.2 2021     2021    CO2 25 2021    BUN 23 2021    CREATININE 1.0 2021    GFRAA >60 2021    LABGLOM >60 2021    GLUCOSE 126 2021    PROT 5.8 2021    CALCIUM 8.7 2021    BILITOT 0.6 2021    ALKPHOS 236 2021    AST 42 2021    ALT 26 2021       POC Tests: No results for input(s): POCGLU, POCNA, POCK, POCCL, POCBUN, POCHEMO, POCHCT in the last 72 hours.     Coags:   Lab Results   Component Value Date    PROTIME 13.1 2020    INR 1.1 2020    APTT psychiatric history: stable with treatmentdepression/anxiety             GI/Hepatic/Renal:   (+) liver disease (Colon cancer with liver mets ):,      (-) no renal disease       Endo/Other:    (+) blood dyscrasia: anemia:., malignancy/cancer. (-) diabetes mellitus, hypothyroidism               Abdominal:         (-) obese       Vascular: negative vascular ROS. Other Findings:             Anesthesia Plan      general     ASA 3       Induction: intravenous. BIS  MIPS: Postoperative opioids intended and Prophylactic antiemetics administered. Anesthetic plan and risks discussed with patient. Plan discussed with CRNA.                   Buck Todd MD   7/12/2021

## 2021-07-13 ENCOUNTER — ANESTHESIA (OUTPATIENT)
Dept: OPERATING ROOM | Age: 71
End: 2021-07-13
Payer: MEDICARE

## 2021-07-13 ENCOUNTER — HOSPITAL ENCOUNTER (OUTPATIENT)
Age: 71
Setting detail: OUTPATIENT SURGERY
Discharge: HOME | End: 2021-07-13
Attending: SURGERY | Admitting: SURGERY
Payer: MEDICARE

## 2021-07-13 VITALS
SYSTOLIC BLOOD PRESSURE: 108 MMHG | OXYGEN SATURATION: 100 % | DIASTOLIC BLOOD PRESSURE: 61 MMHG | RESPIRATION RATE: 2 BRPM

## 2021-07-13 VITALS
HEIGHT: 73 IN | BODY MASS INDEX: 24.7 KG/M2 | HEART RATE: 71 BPM | RESPIRATION RATE: 20 BRPM | WEIGHT: 186.4 LBS | SYSTOLIC BLOOD PRESSURE: 122 MMHG | DIASTOLIC BLOOD PRESSURE: 60 MMHG | TEMPERATURE: 97.1 F | OXYGEN SATURATION: 100 %

## 2021-07-13 PROBLEM — K40.90 RIGHT INGUINAL HERNIA: Status: ACTIVE | Noted: 2021-07-13

## 2021-07-13 PROCEDURE — 2720000010 HC SURG SUPPLY STERILE: Performed by: SURGERY

## 2021-07-13 PROCEDURE — 7100000010 HC PHASE II RECOVERY - FIRST 15 MIN: Performed by: SURGERY

## 2021-07-13 PROCEDURE — 6360000002 HC RX W HCPCS: Performed by: ANESTHESIOLOGY

## 2021-07-13 PROCEDURE — 3700000000 HC ANESTHESIA ATTENDED CARE: Performed by: SURGERY

## 2021-07-13 PROCEDURE — 3700000001 HC ADD 15 MINUTES (ANESTHESIA): Performed by: SURGERY

## 2021-07-13 PROCEDURE — 2580000003 HC RX 258: Performed by: SURGERY

## 2021-07-13 PROCEDURE — 2500000003 HC RX 250 WO HCPCS: Performed by: NURSE ANESTHETIST, CERTIFIED REGISTERED

## 2021-07-13 PROCEDURE — 6370000000 HC RX 637 (ALT 250 FOR IP): Performed by: ANESTHESIOLOGY

## 2021-07-13 PROCEDURE — 2500000003 HC RX 250 WO HCPCS: Performed by: SURGERY

## 2021-07-13 PROCEDURE — C1781 MESH (IMPLANTABLE): HCPCS | Performed by: SURGERY

## 2021-07-13 PROCEDURE — 6360000002 HC RX W HCPCS: Performed by: NURSE ANESTHETIST, CERTIFIED REGISTERED

## 2021-07-13 PROCEDURE — 49505 PRP I/HERN INIT REDUC >5 YR: CPT | Performed by: SURGERY

## 2021-07-13 PROCEDURE — 3600000014 HC SURGERY LEVEL 4 ADDTL 15MIN: Performed by: SURGERY

## 2021-07-13 PROCEDURE — 7100000001 HC PACU RECOVERY - ADDTL 15 MIN: Performed by: SURGERY

## 2021-07-13 PROCEDURE — 2580000003 HC RX 258: Performed by: NURSE ANESTHETIST, CERTIFIED REGISTERED

## 2021-07-13 PROCEDURE — 2709999900 HC NON-CHARGEABLE SUPPLY: Performed by: SURGERY

## 2021-07-13 PROCEDURE — 3600000004 HC SURGERY LEVEL 4 BASE: Performed by: SURGERY

## 2021-07-13 PROCEDURE — 6360000002 HC RX W HCPCS

## 2021-07-13 PROCEDURE — 6360000002 HC RX W HCPCS: Performed by: SURGERY

## 2021-07-13 PROCEDURE — 7100000000 HC PACU RECOVERY - FIRST 15 MIN: Performed by: SURGERY

## 2021-07-13 PROCEDURE — 7100000011 HC PHASE II RECOVERY - ADDTL 15 MIN: Performed by: SURGERY

## 2021-07-13 DEVICE — MESH HERN W2XL4IN VENTRAL INGUINAL POLYPR REP MFIL: Type: IMPLANTABLE DEVICE | Status: FUNCTIONAL

## 2021-07-13 RX ORDER — DEXAMETHASONE SODIUM PHOSPHATE 4 MG/ML
INJECTION, SOLUTION INTRA-ARTICULAR; INTRALESIONAL; INTRAMUSCULAR; INTRAVENOUS; SOFT TISSUE PRN
Status: DISCONTINUED | OUTPATIENT
Start: 2021-07-13 | End: 2021-07-13 | Stop reason: SDUPTHER

## 2021-07-13 RX ORDER — PROCHLORPERAZINE EDISYLATE 5 MG/ML
5 INJECTION INTRAMUSCULAR; INTRAVENOUS
Status: DISCONTINUED | OUTPATIENT
Start: 2021-07-13 | End: 2021-07-13 | Stop reason: HOSPADM

## 2021-07-13 RX ORDER — MEPERIDINE HYDROCHLORIDE 25 MG/ML
INJECTION INTRAMUSCULAR; INTRAVENOUS; SUBCUTANEOUS
Status: COMPLETED
Start: 2021-07-13 | End: 2021-07-13

## 2021-07-13 RX ORDER — SODIUM CHLORIDE, SODIUM LACTATE, POTASSIUM CHLORIDE, CALCIUM CHLORIDE 600; 310; 30; 20 MG/100ML; MG/100ML; MG/100ML; MG/100ML
INJECTION, SOLUTION INTRAVENOUS CONTINUOUS PRN
Status: DISCONTINUED | OUTPATIENT
Start: 2021-07-13 | End: 2021-07-13 | Stop reason: SDUPTHER

## 2021-07-13 RX ORDER — GLYCOPYRROLATE 1 MG/5 ML
SYRINGE (ML) INTRAVENOUS PRN
Status: DISCONTINUED | OUTPATIENT
Start: 2021-07-13 | End: 2021-07-13 | Stop reason: SDUPTHER

## 2021-07-13 RX ORDER — LIDOCAINE HYDROCHLORIDE 20 MG/ML
INJECTION, SOLUTION INTRAVENOUS PRN
Status: DISCONTINUED | OUTPATIENT
Start: 2021-07-13 | End: 2021-07-13 | Stop reason: SDUPTHER

## 2021-07-13 RX ORDER — GABAPENTIN 300 MG/1
300 CAPSULE ORAL 3 TIMES DAILY
Qty: 9 CAPSULE | Refills: 0 | Status: SHIPPED | OUTPATIENT
Start: 2021-07-13 | End: 2021-07-23

## 2021-07-13 RX ORDER — DIPHENHYDRAMINE HYDROCHLORIDE 50 MG/ML
12.5 INJECTION INTRAMUSCULAR; INTRAVENOUS
Status: DISCONTINUED | OUTPATIENT
Start: 2021-07-13 | End: 2021-07-13 | Stop reason: HOSPADM

## 2021-07-13 RX ORDER — MEPERIDINE HYDROCHLORIDE 25 MG/ML
12.5 INJECTION INTRAMUSCULAR; INTRAVENOUS; SUBCUTANEOUS EVERY 5 MIN PRN
Status: DISCONTINUED | OUTPATIENT
Start: 2021-07-13 | End: 2021-07-13 | Stop reason: HOSPADM

## 2021-07-13 RX ORDER — ONDANSETRON 2 MG/ML
INJECTION INTRAMUSCULAR; INTRAVENOUS PRN
Status: DISCONTINUED | OUTPATIENT
Start: 2021-07-13 | End: 2021-07-13 | Stop reason: SDUPTHER

## 2021-07-13 RX ORDER — NEOSTIGMINE METHYLSULFATE 1 MG/ML
INJECTION, SOLUTION INTRAVENOUS PRN
Status: DISCONTINUED | OUTPATIENT
Start: 2021-07-13 | End: 2021-07-13 | Stop reason: SDUPTHER

## 2021-07-13 RX ORDER — PROPOFOL 10 MG/ML
INJECTION, EMULSION INTRAVENOUS PRN
Status: DISCONTINUED | OUTPATIENT
Start: 2021-07-13 | End: 2021-07-13 | Stop reason: SDUPTHER

## 2021-07-13 RX ORDER — NAPROXEN 250 MG/1
250 TABLET ORAL 2 TIMES DAILY WITH MEALS
Qty: 6 TABLET | Refills: 0 | Status: SHIPPED | OUTPATIENT
Start: 2021-07-13 | End: 2021-07-23 | Stop reason: ALTCHOICE

## 2021-07-13 RX ORDER — MIDAZOLAM HYDROCHLORIDE 1 MG/ML
INJECTION INTRAMUSCULAR; INTRAVENOUS PRN
Status: DISCONTINUED | OUTPATIENT
Start: 2021-07-13 | End: 2021-07-13 | Stop reason: SDUPTHER

## 2021-07-13 RX ORDER — BUPIVACAINE HYDROCHLORIDE AND EPINEPHRINE 2.5; 5 MG/ML; UG/ML
INJECTION, SOLUTION EPIDURAL; INFILTRATION; INTRACAUDAL; PERINEURAL PRN
Status: DISCONTINUED | OUTPATIENT
Start: 2021-07-13 | End: 2021-07-13 | Stop reason: ALTCHOICE

## 2021-07-13 RX ORDER — HYDROCODONE BITARTRATE AND ACETAMINOPHEN 5; 325 MG/1; MG/1
1 TABLET ORAL EVERY 4 HOURS PRN
Status: DISCONTINUED | OUTPATIENT
Start: 2021-07-13 | End: 2021-07-13 | Stop reason: HOSPADM

## 2021-07-13 RX ORDER — DEXAMETHASONE SODIUM PHOSPHATE 10 MG/ML
INJECTION, SOLUTION INTRAMUSCULAR; INTRAVENOUS PRN
Status: DISCONTINUED | OUTPATIENT
Start: 2021-07-13 | End: 2021-07-13 | Stop reason: SDUPTHER

## 2021-07-13 RX ORDER — FENTANYL CITRATE 50 UG/ML
INJECTION, SOLUTION INTRAMUSCULAR; INTRAVENOUS PRN
Status: DISCONTINUED | OUTPATIENT
Start: 2021-07-13 | End: 2021-07-13 | Stop reason: SDUPTHER

## 2021-07-13 RX ORDER — LABETALOL HYDROCHLORIDE 5 MG/ML
5 INJECTION, SOLUTION INTRAVENOUS EVERY 10 MIN PRN
Status: DISCONTINUED | OUTPATIENT
Start: 2021-07-13 | End: 2021-07-13 | Stop reason: HOSPADM

## 2021-07-13 RX ORDER — ROCURONIUM BROMIDE 10 MG/ML
INJECTION, SOLUTION INTRAVENOUS PRN
Status: DISCONTINUED | OUTPATIENT
Start: 2021-07-13 | End: 2021-07-13 | Stop reason: SDUPTHER

## 2021-07-13 RX ORDER — PROMETHAZINE HYDROCHLORIDE 25 MG/ML
6.25 INJECTION, SOLUTION INTRAMUSCULAR; INTRAVENOUS
Status: DISCONTINUED | OUTPATIENT
Start: 2021-07-13 | End: 2021-07-13 | Stop reason: HOSPADM

## 2021-07-13 RX ORDER — SODIUM CHLORIDE 9 MG/ML
INJECTION, SOLUTION INTRAVENOUS CONTINUOUS
Status: DISCONTINUED | OUTPATIENT
Start: 2021-07-13 | End: 2021-07-13 | Stop reason: HOSPADM

## 2021-07-13 RX ORDER — CEFAZOLIN SODIUM 2 G/50ML
2000 SOLUTION INTRAVENOUS
Status: COMPLETED | OUTPATIENT
Start: 2021-07-13 | End: 2021-07-13

## 2021-07-13 RX ADMIN — Medication 0.6 MG: at 09:59

## 2021-07-13 RX ADMIN — MEPERIDINE HYDROCHLORIDE 12.5 MG: 25 INJECTION, SOLUTION INTRAMUSCULAR; INTRAVENOUS; SUBCUTANEOUS at 11:29

## 2021-07-13 RX ADMIN — DEXAMETHASONE SODIUM PHOSPHATE 4 MG: 4 INJECTION, SOLUTION INTRAMUSCULAR; INTRAVENOUS at 08:42

## 2021-07-13 RX ADMIN — ONDANSETRON 4 MG: 2 INJECTION INTRAMUSCULAR; INTRAVENOUS at 09:59

## 2021-07-13 RX ADMIN — CEFAZOLIN SODIUM 2000 MG: 2 SOLUTION INTRAVENOUS at 08:37

## 2021-07-13 RX ADMIN — SODIUM CHLORIDE, POTASSIUM CHLORIDE, SODIUM LACTATE AND CALCIUM CHLORIDE: 600; 310; 30; 20 INJECTION, SOLUTION INTRAVENOUS at 08:37

## 2021-07-13 RX ADMIN — SODIUM CHLORIDE: 9 INJECTION, SOLUTION INTRAVENOUS at 08:24

## 2021-07-13 RX ADMIN — LIDOCAINE HYDROCHLORIDE 100 MG: 20 INJECTION, SOLUTION INTRAVENOUS at 08:42

## 2021-07-13 RX ADMIN — FENTANYL CITRATE 50 MCG: 50 INJECTION, SOLUTION INTRAMUSCULAR; INTRAVENOUS at 09:17

## 2021-07-13 RX ADMIN — HYDROMORPHONE HYDROCHLORIDE 0.25 MG: 1 INJECTION, SOLUTION INTRAMUSCULAR; INTRAVENOUS; SUBCUTANEOUS at 11:00

## 2021-07-13 RX ADMIN — MIDAZOLAM 2 MG: 1 INJECTION INTRAMUSCULAR; INTRAVENOUS at 08:37

## 2021-07-13 RX ADMIN — ROCURONIUM BROMIDE 40 MG: 10 SOLUTION INTRAVENOUS at 08:42

## 2021-07-13 RX ADMIN — HYDROMORPHONE HYDROCHLORIDE 0.5 MG: 1 INJECTION, SOLUTION INTRAMUSCULAR; INTRAVENOUS; SUBCUTANEOUS at 10:39

## 2021-07-13 RX ADMIN — SODIUM CHLORIDE, POTASSIUM CHLORIDE, SODIUM LACTATE AND CALCIUM CHLORIDE: 600; 310; 30; 20 INJECTION, SOLUTION INTRAVENOUS at 09:59

## 2021-07-13 RX ADMIN — MEPERIDINE HYDROCHLORIDE 12.5 MG: 25 INJECTION, SOLUTION INTRAMUSCULAR; INTRAVENOUS; SUBCUTANEOUS at 11:14

## 2021-07-13 RX ADMIN — FENTANYL CITRATE 50 MCG: 50 INJECTION, SOLUTION INTRAMUSCULAR; INTRAVENOUS at 09:12

## 2021-07-13 RX ADMIN — FENTANYL CITRATE 50 MCG: 50 INJECTION, SOLUTION INTRAMUSCULAR; INTRAVENOUS at 10:14

## 2021-07-13 RX ADMIN — PROPOFOL 200 MG: 10 INJECTION, EMULSION INTRAVENOUS at 08:42

## 2021-07-13 RX ADMIN — Medication 3 MG: at 09:59

## 2021-07-13 RX ADMIN — FENTANYL CITRATE 100 MCG: 50 INJECTION, SOLUTION INTRAMUSCULAR; INTRAVENOUS at 09:22

## 2021-07-13 RX ADMIN — HYDROMORPHONE HYDROCHLORIDE 0.25 MG: 1 INJECTION, SOLUTION INTRAMUSCULAR; INTRAVENOUS; SUBCUTANEOUS at 10:49

## 2021-07-13 RX ADMIN — DEXAMETHASONE SODIUM PHOSPHATE 10 MG: 10 INJECTION, SOLUTION INTRAMUSCULAR; INTRAVENOUS at 08:42

## 2021-07-13 RX ADMIN — FENTANYL CITRATE 50 MCG: 50 INJECTION, SOLUTION INTRAMUSCULAR; INTRAVENOUS at 08:42

## 2021-07-13 RX ADMIN — Medication 0.5 MG: at 10:39

## 2021-07-13 RX ADMIN — FENTANYL CITRATE 50 MCG: 50 INJECTION, SOLUTION INTRAMUSCULAR; INTRAVENOUS at 09:59

## 2021-07-13 RX ADMIN — HYDROCODONE BITARTRATE AND ACETAMINOPHEN 1 TABLET: 5; 325 TABLET ORAL at 12:59

## 2021-07-13 ASSESSMENT — PULMONARY FUNCTION TESTS
PIF_VALUE: 2
PIF_VALUE: 11
PIF_VALUE: 10
PIF_VALUE: 11
PIF_VALUE: 10
PIF_VALUE: 1
PIF_VALUE: 10
PIF_VALUE: 0
PIF_VALUE: 11
PIF_VALUE: 0
PIF_VALUE: 2
PIF_VALUE: 14
PIF_VALUE: 3
PIF_VALUE: 10
PIF_VALUE: 15
PIF_VALUE: 2
PIF_VALUE: 10
PIF_VALUE: 0
PIF_VALUE: 12
PIF_VALUE: 2
PIF_VALUE: 10
PIF_VALUE: 9
PIF_VALUE: 3
PIF_VALUE: 2
PIF_VALUE: 2
PIF_VALUE: 12
PIF_VALUE: 10
PIF_VALUE: 10
PIF_VALUE: 14
PIF_VALUE: 13
PIF_VALUE: 2
PIF_VALUE: 9
PIF_VALUE: 12
PIF_VALUE: 5
PIF_VALUE: 10
PIF_VALUE: 10
PIF_VALUE: 9
PIF_VALUE: 2
PIF_VALUE: 14
PIF_VALUE: 10
PIF_VALUE: 10
PIF_VALUE: 11
PIF_VALUE: 2
PIF_VALUE: 5
PIF_VALUE: 14
PIF_VALUE: 11
PIF_VALUE: 10
PIF_VALUE: 2
PIF_VALUE: 11
PIF_VALUE: 11
PIF_VALUE: 7
PIF_VALUE: 3
PIF_VALUE: 2
PIF_VALUE: 10
PIF_VALUE: 1
PIF_VALUE: 10
PIF_VALUE: 2
PIF_VALUE: 2
PIF_VALUE: 11
PIF_VALUE: 1
PIF_VALUE: 4
PIF_VALUE: 10
PIF_VALUE: 10
PIF_VALUE: 2
PIF_VALUE: 19
PIF_VALUE: 11
PIF_VALUE: 10
PIF_VALUE: 13
PIF_VALUE: 0
PIF_VALUE: 7
PIF_VALUE: 14
PIF_VALUE: 11
PIF_VALUE: 10
PIF_VALUE: 13
PIF_VALUE: 2
PIF_VALUE: 10
PIF_VALUE: 3
PIF_VALUE: 11
PIF_VALUE: 10
PIF_VALUE: 12
PIF_VALUE: 13
PIF_VALUE: 3
PIF_VALUE: 5
PIF_VALUE: 11
PIF_VALUE: 0
PIF_VALUE: 2
PIF_VALUE: 26
PIF_VALUE: 2
PIF_VALUE: 3
PIF_VALUE: 14
PIF_VALUE: 16
PIF_VALUE: 10
PIF_VALUE: 22
PIF_VALUE: 10
PIF_VALUE: 2
PIF_VALUE: 2

## 2021-07-13 ASSESSMENT — PAIN DESCRIPTION - ORIENTATION
ORIENTATION: LOWER;RIGHT
ORIENTATION: LOWER
ORIENTATION: MID;LOWER
ORIENTATION: MID;LOWER
ORIENTATION: MID
ORIENTATION: LOWER;RIGHT
ORIENTATION: LOWER
ORIENTATION: LOWER;RIGHT

## 2021-07-13 ASSESSMENT — PAIN DESCRIPTION - FREQUENCY
FREQUENCY: CONTINUOUS

## 2021-07-13 ASSESSMENT — PAIN SCALES - GENERAL
PAINLEVEL_OUTOF10: 5
PAINLEVEL_OUTOF10: 8
PAINLEVEL_OUTOF10: 8
PAINLEVEL_OUTOF10: 6
PAINLEVEL_OUTOF10: 7
PAINLEVEL_OUTOF10: 4
PAINLEVEL_OUTOF10: 8
PAINLEVEL_OUTOF10: 5
PAINLEVEL_OUTOF10: 4
PAINLEVEL_OUTOF10: 7

## 2021-07-13 ASSESSMENT — PAIN DESCRIPTION - PAIN TYPE
TYPE: ACUTE PAIN;SURGICAL PAIN
TYPE: ACUTE PAIN;SURGICAL PAIN
TYPE: SURGICAL PAIN
TYPE: SURGICAL PAIN
TYPE: ACUTE PAIN;SURGICAL PAIN
TYPE: ACUTE PAIN;SURGICAL PAIN
TYPE: SURGICAL PAIN
TYPE: ACUTE PAIN;SURGICAL PAIN
TYPE: ACUTE PAIN;SURGICAL PAIN

## 2021-07-13 ASSESSMENT — PAIN DESCRIPTION - LOCATION
LOCATION: ABDOMEN

## 2021-07-13 ASSESSMENT — PAIN DESCRIPTION - PROGRESSION
CLINICAL_PROGRESSION: GRADUALLY IMPROVING
CLINICAL_PROGRESSION: GRADUALLY WORSENING
CLINICAL_PROGRESSION: GRADUALLY IMPROVING
CLINICAL_PROGRESSION: RAPIDLY IMPROVING
CLINICAL_PROGRESSION: GRADUALLY WORSENING
CLINICAL_PROGRESSION: GRADUALLY WORSENING

## 2021-07-13 ASSESSMENT — PAIN - FUNCTIONAL ASSESSMENT: PAIN_FUNCTIONAL_ASSESSMENT: 0-10

## 2021-07-13 ASSESSMENT — PAIN DESCRIPTION - DESCRIPTORS
DESCRIPTORS: ACHING;DISCOMFORT
DESCRIPTORS: ACHING
DESCRIPTORS: DISCOMFORT;ACHING
DESCRIPTORS: ACHING;DISCOMFORT
DESCRIPTORS: ACHING
DESCRIPTORS: ACHING;DISCOMFORT

## 2021-07-13 NOTE — PROGRESS NOTES
7/13/21 1345 álvarez switched to leg bag. Pt tolerated well.  pt and lovely instructed to follow up with dr Tej Mathews  On Friday for removal. clara delaney

## 2021-07-13 NOTE — PROGRESS NOTES
7/13/21 4265 reviewed discharge instructions with pt and his significant other lovely.  Both verbalized understanding,signed in agreement and given copy. kmo rn

## 2021-07-13 NOTE — ANESTHESIA POSTPROCEDURE EVALUATION
Department of Anesthesiology  Postprocedure Note    Patient: Mallorie Reynolds  MRN: [id number]  YOB: 1950  Date of evaluation: 7/13/2021  Time:  1:51 PM     Procedure Summary     Date: 07/13/21 Room / Location: [address] [place] [address] / [address] [place] [address]    Anesthesia Start: 7547 Anesthesia Stop: 4546    Procedure: OPEN RIGHT INGUINAL HERNIA REPAIR WITH MESH (CPT 72604) (Right ) Diagnosis: (RIGHT INGUINAL HERNIA)    Surgeons: Dick Pedro MD Responsible Provider: Marlen Chen MD    Anesthesia Type: general ASA Status: 3          Anesthesia Type: general    Jose Phase I: Jose Score: 10    Jose Phase II: Jose Score: 10    Last vitals: Reviewed and per EMR flowsheets.        Anesthesia Post Evaluation    Patient location during evaluation: PACU  Patient participation: complete - patient participated  Level of consciousness: awake  Pain score: 2  Airway patency: patent  Nausea & Vomiting: no nausea and no vomiting  Complications: no  Cardiovascular status: hemodynamically stable  Respiratory status: acceptable  Hydration status: euvolemic

## 2021-07-13 NOTE — H&P
Jeffrey Odom  7/13/2021  [address]              History and Physical    Jeffrey Odom is a 66 y.o., male, with a large right inguinal/scrotal hernia which is now painful and he wants it repaired. I did a lap victoria 10/2020 and he couldn't urinate and had to go home with a catheter for two weeks. Now he is on Flomax twice a day but does not want to risk having to go to the ER for catheter placement post op, wants a catheter placed while asleep. Weight= 186 lb 6.4 oz (84.6 kg) Body mass index is 24.59 kg/m². Past Medical History:   Diagnosis Date    Arthritis     Cancer ([facility] [address]     colon    Depression     History of blood transfusion     [facility]    Hyperlipidemia     Hypertension     Prolonged emergence from general anesthesia     Retention of urine        Past Surgical History:   Procedure Laterality Date    CHOLECYSTECTOMY, LAPAROSCOPIC N/A 10/27/2020    CHOLECYSTECTOMY LAPAROSCOPIC WITH IOC, ROBOT XI ASSISTED, POSS OPEN performed by Alfonso Burger MD at [address] [facility] E  11/2/15    bx, rectal mass    FRACTURE SURGERY      KNEE ARTHROSCOPY Right     LIVER BIOPSY  11/12/15    SKIN FULL GRAFT      on finger    TONSILLECTOMY Bilateral     TUNNELED CENTRAL VENOUS CATHETER W/ SUBCUTANEOUS PORT Left        Home Medications  Prior to Visit Medications    Medication Sig Taking?  Authorizing Provider   Magic Mouthwash (MIRACLE MOUTHWASH) Swish and swallow 15 mLs 4 times daily as needed for Irritation Equal Amts of Benadryl,Maalox, and Xylocaine Yes RUBEN Walters - CNP   ferrous gluconate (FERGON) 324 (38 Fe) MG tablet Take 324 mg by mouth daily (with breakfast) Yes Historical Provider, MD   traMADol (ULTRAM) 50 MG tablet TAKE 1 TABLET BY MOUTH EVERY 4 TO 6 HOURS AS NEEDED FOR PAIN Yes Historical Provider, MD   tamsulosin (FLOMAX) 0.4 MG capsule Take 2 capsules by mouth daily Yes Reuben Schultz MD   ondansetron (ZOFRAN) 4 MG tablet Take 1 tablet by mouth every 8 hours as needed for Nausea or Vomiting Yes RUBEN Christopher - CNP   lactulose (CHRONULAC) 10 GM/15ML solution Take 20 g by mouth as needed  Yes Historical Provider, MD   furosemide (LASIX) 20 MG tablet Take 20 mg by mouth daily Indications: pt is taken this med 7 days a week for now  Yes Historical Provider, MD   magnesium oxide (MAG-OX) 400 (240 Mg) MG tablet Take 1 tablet by mouth 2 times daily  Patient taking differently: Take 400 mg by mouth daily  Yes Maris Galaviz MD   potassium chloride (KLOR-CON M) 20 MEQ extended release tablet Take 40 mEq by mouth daily  Yes Historical Provider, MD   loratadine (CLARITIN) 10 MG tablet Take 10 mg by mouth See Admin Instructions Takes the week of chemotherapy (Last dose: 5/9; Next dose: 5/26) Yes Historical Provider, MD   pantoprazole (PROTONIX) 40 MG tablet Take 40 mg by mouth daily Yes Historical Provider, MD   b complex vitamins capsule Take 1 capsule by mouth daily Yes Historical Provider, MD   diphenoxylate-atropine (LOMOTIL) 2.5-0.025 MG per tablet Take 1 tablet by mouth 4 times daily as needed for Diarrhea. Yes Historical Provider, MD   polyethylene glycol (GLYCOLAX) packet Take 17 g by mouth daily as needed for Constipation Yes Historical Provider, MD   hydrochlorothiazide (HYDRODIURIL) 25 MG tablet Take 1 tablet by mouth daily Yes RUBEN Sandoval - CNP   hydrocortisone (ANUSOL-HC) 2.5 % rectal cream Use 3-4 times daily. Do not use internally. External use only. Yes RUBEN Jackson - CNP   acetaminophen (TYLENOL) 500 MG tablet Take 500 mg by mouth every 6 hours as needed for Pain Yes Historical Provider, MD       Allergies: Neosporin [neomycin-polymyxin-gramicidin] and Tape [adhesive tape]   Social History:   TOBACCO:   reports that he quit smoking about 20 years ago. His smoking use included cigarettes. He has a 40.00 pack-year smoking history.  He has never used smokeless tobacco.  All smokers must join the free smoking cessation program and stop smoking for 3 months before having any Bariatric surgery. ETOH:    reports no history of alcohol use. Family History   Problem Relation Age of Onset   Marianela Her Cancer Mother         breast cancer    Cancer Father         malignant brain tumor    Diabetes Brother      Review of Systems:  Psychiatric:  depression and anxiety  Respiratory: negative  Cardiovascular: negative  Gastrointestinal: negative  Musculoskeletal:negative  All others reviewed, negative    Physical Exam:   VITALS: Blood pressure (!) 110/53, pulse 72, temperature 98.9 °F (37.2 °C), temperature source Infrared, resp. rate 18, height 6' 1\" (1.854 m), weight 186 lb 6.4 oz (84.6 kg), SpO2 99 %. General appearance: alert, appears stated age and cooperative, does ambulate easily  Head: Normocephalic, without obvious abnormality, atraumatic  Eyes: PERRL  Ears/mouth/throat:  Ears clear, mouth normal, throat no redness  Neck: no adenopathy, no JVD, supple, symmetrical, trachea midline and thyroid not enlarged  Lungs: clear to auscultation bilaterally  Heart: regular rate and rhythm  Abdomen: large right inguinal/scrotal hernia  Extremities: extremities normal, atraumatic, no cyanosis or edema  Skin: no open wounds    Assessment:  Large right inguinal/scrotal hernia  Had to go home with a álvarez after the last surgery. Plan:  Open right Inguinal Hernia Repair with mesh  He wants a Álvarez catheter to be placed and he will go home with it, may need an overnight stay. We discussed the risk of surgery including wound or mesh infections, recurrent hernias and the risks of general anesthetic including MI, CVA, sudden death or reactions to anesthetic medications. The patient understands the risks. All questions were answered to the patient's satisfaction and they freely signed the consent for a Laparoscopic/robotic inguinal hernia repair with mesh, possible open. Stay on a high protein, low calorie diet, drink plenty of water. Clean out the bowels the night before surgery. He will go home with Neurontin 300 mg tid for three days, Acetaminophen 500 mg qid for 3 days, Oxycodone 5 mg q 8 hours prn for breakthrough pain.       Physician Signature: Electronically signed by Dr. Ravinder Zee MD    Send copy to Evaristo Larry MD

## 2021-07-13 NOTE — OP NOTE
Operative Note      Patient: Cathryn Brumfield  YOB: 1950  MRN: [id number]    Date of Procedure: 7/13/2021    Pre-Op Diagnosis: RIGHT INGUINAL/SCROTAL HERNIA    Post-Op Diagnosis: Same       Procedure(s):  OPEN RIGHT INGUINAL HERNIA REPAIR WITH MESH (CPT 14944)    Surgeon(s):  Rachel Frey MD    Assistant:   Surgical Assistant: Abilio Grade    Anesthesia: General    Estimated Blood Loss (mL): 5 cc    Complications: None    Specimens:   * No specimens in log *    Implants:  Implant Name Type Inv. Item Serial No.  Lot No. LRB No. Used Action   MESH DELTA [id number] VENTRAL INGUINAL POLYPR REP MFIL  MESH DELTA [id number] VENTRAL INGUINAL POLYPR REP MFIL  BARD DAVOL-WD [id number] Right 1 Implanted         Drains:   [REMOVED] Urethral Catheter Non-latex 16 fr     INDICATIONS: Cathryn Brumfield is a 66y.o. year old male who weighs 186 lb 6.4 oz (84.6 kg) with a large right inguinal/scrotal hernia which is now painful and he wants it repaired. He is on Flomax twice a day but does not want to risk having to go to the ER for álvarez catheter placement post op, so he wants a catheter placed while asleep. All risks, benefits, complications, and expected outcomes of the inguinal hernia repair with mesh were discussed at length with the patient. We discussed the extensive risks involved in the surgery including the risk of bleeding, infection, and needing further procedures. We also discussed wound infections, hernias and the risks of general anesthetic including MI, CVA, sudden death or reactions to anesthetic medications. The patient understood the risks. All questions were answered to the patient's satisfaction and they freely signed the consent and wished to proceed. PROCEDURE: Patient was taken to the operating room, placed in the supine position. He was given Ancef 2 g IV pre-op. After adequate anesthesia, the groin was clipped of hair and prepped and draped in the usual sterile fashion.  An area above the inguinal ligament at the region of his external ring was identified and a marking pen then locally anesthetized using Marcaine. The area medial to the iliac spine was injected to numb the ilioinguinal nerve. A 15-blade scalpel was used to make an incision along the skin lines. Electrocautery was used to dissect down through the subcutaneous tissue and Scarpas. The external oblique aponeurosis was opened through the external ring using electrocautery and Metzenbaum scissors. The large hernia and spermatic cord was encircled with a finger and penrose drain. The large sac was opened and found to extend to the bottom on the scrotum. Using blunt dissection and electrocautery to dissect the large hernia sac away from the cord contents, the hernia was completely freed from the cord structures all the way into the scrotum and reduced. The sac was closed and pushed into the peritoneal cavity. Hemostasis was achieved using electrocautery. The testicle was pushed back down into the scrotum. A small keyhole mesh was used to repair the hernia defect using 0 permanent V-lock suture in a running fashion along the shelving edge inferiorly. A second suture was used to sew the superior edge of the mesh to the transverse-abdominus aponeurosis fascia and muscle. The suture was continued laterally to fashion a new external inguinal ring. Again the spermatic cord and testes was identified within the scrotum and appeared viable. The external oblique fascia was reapproximated with absorbable V-lock suture in a running fashion. A 4-0 Monocryl was used to reapproximate the skin edges in a subcuticular fashion. Skin-Afix glue was placed on the incision, no dressing. Patient tolerated the procedure well, scrotal support was placed and he was sent to the post anesthesia care unit in stable condition. Plan:  Discharge home with Neurontin, Tramadol and Aleve for pain. Ambulate frequently.  Allen to be removed next week by his urologist. Deep breathing exercises. Make sure the bowels move every day with stool softeners, fiber, fluids or laxative as needed.       Signed: Dr. Star Macedo MD   on 7/13/2021 at 10:12 AM

## 2021-07-15 NOTE — PROGRESS NOTES
CLINICAL PHARMACY NOTE: MEDS TO BEDS    Total # of Prescriptions Filled: 2   The following medications were delivered to the patient:  · Naproxen 250 mg  · Gabapentin 300 mg    Additional Documentation:

## 2021-07-16 ENCOUNTER — OFFICE VISIT (OUTPATIENT)
Dept: SURGERY | Age: 71
End: 2021-07-16

## 2021-07-16 VITALS
SYSTOLIC BLOOD PRESSURE: 119 MMHG | RESPIRATION RATE: 20 BRPM | OXYGEN SATURATION: 97 % | DIASTOLIC BLOOD PRESSURE: 62 MMHG | WEIGHT: 199 LBS | TEMPERATURE: 98.2 F | HEIGHT: 73 IN | BODY MASS INDEX: 26.37 KG/M2 | HEART RATE: 65 BPM

## 2021-07-16 DIAGNOSIS — K40.90 RIGHT INGUINAL HERNIA: Primary | ICD-10-CM

## 2021-07-16 PROCEDURE — 99024 POSTOP FOLLOW-UP VISIT: CPT | Performed by: SURGERY

## 2021-07-16 NOTE — PROGRESS NOTES
Amaris Talavera  7/16/2021  [address]     Post-Operative Follow-up. Amaris Talavera is a 66 y.o. male post open right inguinal hernia repair with mesh 7/123/21, álvarez catheter was left in the bladder. He didn't elevated the buttocks or legs post op and now has leg and buttocks swelling with moderate skin bruising of the groin. Wound healing well. Took a laxative yesterday and bowels moved well. Weight: 199 lb (90.3 kg). Past Medical History:   Diagnosis Date    Arthritis     Cancer ([address]     colon    Depression     History of blood transfusion     [facility]es    Hyperlipidemia     Hypertension     Prolonged emergence from general anesthesia     Retention of urine      Past Surgical History:   Procedure Laterality Date    CHOLECYSTECTOMY, LAPAROSCOPIC N/A 10/27/2020    CHOLECYSTECTOMY LAPAROSCOPIC WITH IOC, ROBOT XI ASSISTED, POSS OPEN performed by Kayley Mcdonald MD at [address] [place] [address] E  11/2/15    bx, rectal mass    FRACTURE SURGERY      HERNIA REPAIR Right 7/13/2021    OPEN RIGHT INGUINAL HERNIA REPAIR WITH MESH (CPT 89909) performed by Kayley Mcdonald MD at [place] [address] ARTHROSCOPY Right     LIVER BIOPSY  11/12/15    SKIN FULL GRAFT      on finger    TONSILLECTOMY Bilateral     TUNNELED CENTRAL VENOUS CATHETER W/ SUBCUTANEOUS PORT Left        Home Medications  Prior to Visit Medications    Medication Sig Taking? Authorizing Provider   gabapentin (NEURONTIN) 300 MG capsule Take 1 capsule by mouth 3 times daily for 3 days.  Yes Kayley Mcdonald MD   naproxen (NAPROSYN) 250 MG tablet Take 1 tablet by mouth 2 times daily (with meals) for 3 days Yes Kayley Mcdonald MD   Magic Mouthwash (MIRACLE MOUTHWASH) Swish and swallow 15 mLs 4 times daily as needed for Irritation Equal Amts of Benadryl,Maalox, and Xylocaine Yes Auberjewel Aldridgete, APRN - CNP   ferrous gluconate (FERGON) 324 (38 Fe) MG tablet Take 324 mg by mouth daily (with breakfast) Yes Historical Provider, MD   tamsulosin (FLOMAX) 0.4 MG capsule Take 2 capsules by mouth daily Yes Baylee Allen MD   ondansetron (ZOFRAN) 4 MG tablet Take 1 tablet by mouth every 8 hours as needed for Nausea or Vomiting Yes RUBEN Stone CNP   furosemide (LASIX) 20 MG tablet Take 20 mg by mouth daily Indications: pt is taken this med 7 days a week for now  Yes Historical Provider, MD   magnesium oxide (MAG-OX) 400 (240 Mg) MG tablet Take 1 tablet by mouth 2 times daily  Patient taking differently: Take 400 mg by mouth daily  Yes Harvinder Ramirez MD   potassium chloride (KLOR-CON M) 20 MEQ extended release tablet Take 40 mEq by mouth daily  Yes Historical Provider, MD   loratadine (CLARITIN) 10 MG tablet Take 10 mg by mouth See Admin Instructions Takes the week of chemotherapy (Last dose: 5/9; Next dose: 5/26) Yes Historical Provider, MD   pantoprazole (PROTONIX) 40 MG tablet Take 40 mg by mouth daily Yes Historical Provider, MD   b complex vitamins capsule Take 1 capsule by mouth daily Yes Historical Provider, MD   diphenoxylate-atropine (LOMOTIL) 2.5-0.025 MG per tablet Take 1 tablet by mouth 4 times daily as needed for Diarrhea. Yes Historical Provider, MD   polyethylene glycol (GLYCOLAX) packet Take 17 g by mouth daily as needed for Constipation Yes Historical Provider, MD   hydrochlorothiazide (HYDRODIURIL) 25 MG tablet Take 1 tablet by mouth daily Yes RUBEN Sapp CNP   hydrocortisone (ANUSOL-HC) 2.5 % rectal cream Use 3-4 times daily. Do not use internally. External use only.  Yes RUBEN Castillo CNP   acetaminophen (TYLENOL) 500 MG tablet Take 500 mg by mouth every 6 hours as needed for Pain Yes Historical Provider, MD   traMADol (ULTRAM) 50 MG tablet TAKE 1 TABLET BY MOUTH EVERY 4 TO 6 HOURS AS NEEDED FOR PAIN  Patient not taking: Reported on 7/16/2021  Historical Provider, MD   lactulose (CHRONULAC) 10 GM/15ML solution Take 20 g by mouth as needed   Patient not taking: Reported on 7/16/2021  Historical Provider, MD       Allergies: Neosporin [neomycin-polymyxin-gramicidin] and Tape [adhesive tape]     Physical Exam:   VITALS: Blood pressure 119/62, pulse 65, temperature 98.2 °F (36.8 °C), temperature source Infrared, resp. rate 20, height 6' 1\" (1.854 m), weight 199 lb (90.3 kg), SpO2 97 %. General appearance: alert, appears stated age and cooperative, does ambulate easily  Head: Normocephalic, without obvious abnormality, atraumatic  Eyes: PERRL  Ears/mouth/throat:  Ears clear, mouth normal, throat no redness  Neck: no adenopathy, no JVD, supple, symmetrical, trachea midline and thyroid not enlarged  Lungs: clear to auscultation bilaterally  Heart: regular rate and rhythm  Abdomen: incision healing well, moderate skin bruising down through the scrotum  Extremities: 3 + leg edema  Skin: no open wounds    Assessment:    Leg and groin swelling. Plan:   Barry Tresa down with the buttocks and legs elevated above the heart for 3 days to make the swelling decrease. Will leave the álvarez catheter until the swelling decreases. Follow up 2 weeks.     Physician Signature: Electronically signed by Dr. Apryl Castellanos MD

## 2021-07-23 ENCOUNTER — OFFICE VISIT (OUTPATIENT)
Dept: SURGERY | Age: 71
End: 2021-07-23
Payer: MEDICARE

## 2021-07-23 VITALS
TEMPERATURE: 98.3 F | HEART RATE: 72 BPM | BODY MASS INDEX: 26.37 KG/M2 | HEIGHT: 73 IN | WEIGHT: 199 LBS | SYSTOLIC BLOOD PRESSURE: 112 MMHG | DIASTOLIC BLOOD PRESSURE: 60 MMHG

## 2021-07-23 DIAGNOSIS — R33.9 URINARY RETENTION: Primary | ICD-10-CM

## 2021-07-23 PROCEDURE — 99213 OFFICE O/P EST LOW 20 MIN: CPT | Performed by: SURGERY

## 2021-07-23 NOTE — PROGRESS NOTES
Simon Ramey  7/23/2021  [facility] office visit    Simon Ramey is a 66 y.o. male post open right inguinal hernia repair with mesh 7/123/21, álvarez catheter was left in the bladder. He didn't elevated the buttocks or legs post op due to back pain so the leg and buttocks swelled with moderate skin bruising of the groin. Wound healing well. Álvarez catheter removed today, no problems. Weight: 199 lb (90.3 kg). Past Medical History:   Diagnosis Date    Arthritis     Cancer ([address]     colon    Depression     History of blood transfusion     [facility]es    Hyperlipidemia     Hypertension     Prolonged emergence from general anesthesia     Retention of urine      Past Surgical History:   Procedure Laterality Date    CHOLECYSTECTOMY, LAPAROSCOPIC N/A 10/27/2020    CHOLECYSTECTOMY LAPAROSCOPIC WITH IOC, ROBOT XI ASSISTED, POSS OPEN performed by Alexis Mullen MD at [address] [place] [address] E  11/2/15    bx, rectal mass    FRACTURE SURGERY      HERNIA REPAIR Right 7/13/2021    OPEN RIGHT INGUINAL HERNIA REPAIR WITH MESH (CPT 05539) performed by Alexis Mullen MD at [place] [address] ARTHROSCOPY Right     LIVER BIOPSY  11/12/15    SKIN FULL GRAFT      on finger    TONSILLECTOMY Bilateral     TUNNELED CENTRAL VENOUS CATHETER W/ SUBCUTANEOUS PORT Left        Home Medications  Prior to Visit Medications    Medication Sig Taking?  Authorizing Provider   hydrocortisone 2.5 % cream APPLY TO AFFECTED AREA, NOT TO FACE OR GENITAL AREAS, NOT TO EXCEED 4 WKS OF USE Yes Historical Provider, MD   Magic Mouthwash (MIRACLE MOUTHWASH) Swish and swallow 15 mLs 4 times daily as needed for Irritation Equal Amts of Benadryl,Maalox, and Xylocaine Yes RUBEN Bernal - CNP   ferrous gluconate (FERGON) 324 (38 Fe) MG tablet Take 324 mg by mouth daily (with breakfast) Yes Historical Provider, MD   traMADol (ULTRAM) 50 MG tablet TAKE 1 TABLET BY MOUTH EVERY 4 TO 6 HOURS AS NEEDED FOR PAIN Yes Historical Provider, MD   tamsulosin (FLOMAX) 0.4 MG capsule Take 2 capsules by mouth daily Yes Evie Lesch, MD   ondansetron (ZOFRAN) 4 MG tablet Take 1 tablet by mouth every 8 hours as needed for Nausea or Vomiting Yes RUBEN Boudreaux CNP   lactulose (CHRONULAC) 10 GM/15ML solution Take 20 g by mouth as needed  Yes Historical Provider, MD   furosemide (LASIX) 20 MG tablet Take 20 mg by mouth daily Indications: pt is taken this med 7 days a week for now  Yes Historical Provider, MD   magnesium oxide (MAG-OX) 400 (240 Mg) MG tablet Take 1 tablet by mouth 2 times daily  Patient taking differently: Take 400 mg by mouth daily  Yes Sandy Swenson MD   potassium chloride (KLOR-CON M) 20 MEQ extended release tablet Take 40 mEq by mouth daily  Yes Historical Provider, MD   loratadine (CLARITIN) 10 MG tablet Take 10 mg by mouth See Admin Instructions Takes the week of chemotherapy (Last dose: 5/9; Next dose: 5/26) Yes Historical Provider, MD   pantoprazole (PROTONIX) 40 MG tablet Take 40 mg by mouth daily Yes Historical Provider, MD   b complex vitamins capsule Take 1 capsule by mouth daily Yes Historical Provider, MD   diphenoxylate-atropine (LOMOTIL) 2.5-0.025 MG per tablet Take 1 tablet by mouth 4 times daily as needed for Diarrhea. Yes Historical Provider, MD   polyethylene glycol (GLYCOLAX) packet Take 17 g by mouth daily as needed for Constipation Yes Historical Provider, MD   hydrochlorothiazide (HYDRODIURIL) 25 MG tablet Take 1 tablet by mouth daily Yes RUBEN Springer CNP   hydrocortisone (ANUSOL-HC) 2.5 % rectal cream Use 3-4 times daily. Do not use internally. External use only.  Yes RUBEN Guzmán CNP   acetaminophen (TYLENOL) 500 MG tablet Take 500 mg by mouth every 6 hours as needed for Pain Yes Historical Provider, MD       Allergies: Neosporin [neomycin-polymyxin-gramicidin] and Tape [adhesive tape]     Physical Exam:   VITALS: Blood pressure 112/60, pulse 72, temperature 98.3 °F (36.8 °C), temperature source Temporal, height 6' 1\" (1.854 m), weight 199 lb (90.3 kg). General appearance: alert, appears stated age and cooperative, does ambulate easily  Head: Normocephalic, without obvious abnormality, atraumatic  Eyes: PERRL  Ears/mouth/throat:  Ears clear, mouth normal, throat no redness  Neck: no adenopathy, no JVD, supple, symmetrical, trachea midline and thyroid not enlarged  Lungs: clear to auscultation bilaterally  Heart: regular rate and rhythm  Abdomen: incision healing well, moderate skin bruising down through the scrotum  Extremities: 1 + leg edema  Skin: no open wounds    Assessment:    Leg and groin swelling improving, catheter removed. Plan:   Heating pad and lay down with the buttocks and legs elevated above the heart as mch as possible to make the swelling decrease. Follow up 12 weeks.     Physician Signature: Electronically signed by Dr. Ashvin Maza MD

## 2021-07-24 ENCOUNTER — HOSPITAL ENCOUNTER (INPATIENT)
Age: 71
LOS: 2 days | Discharge: HOME | DRG: 698 | End: 2021-07-27
Attending: EMERGENCY MEDICINE | Admitting: INTERNAL MEDICINE
Payer: MEDICARE

## 2021-07-24 DIAGNOSIS — R50.9 FEVER, UNSPECIFIED FEVER CAUSE: ICD-10-CM

## 2021-07-24 DIAGNOSIS — N30.00 ACUTE CYSTITIS WITHOUT HEMATURIA: Primary | ICD-10-CM

## 2021-07-24 PROCEDURE — 87040 BLOOD CULTURE FOR BACTERIA: CPT

## 2021-07-24 PROCEDURE — 85025 COMPLETE CBC W/AUTO DIFF WBC: CPT

## 2021-07-24 PROCEDURE — 6360000002 HC RX W HCPCS: Performed by: EMERGENCY MEDICINE

## 2021-07-24 PROCEDURE — 83605 ASSAY OF LACTIC ACID: CPT

## 2021-07-24 PROCEDURE — 2580000003 HC RX 258: Performed by: EMERGENCY MEDICINE

## 2021-07-24 PROCEDURE — 80053 COMPREHEN METABOLIC PANEL: CPT

## 2021-07-24 PROCEDURE — 87635 SARS-COV-2 COVID-19 AMP PRB: CPT

## 2021-07-24 PROCEDURE — 96374 THER/PROPH/DIAG INJ IV PUSH: CPT

## 2021-07-24 PROCEDURE — 36415 COLL VENOUS BLD VENIPUNCTURE: CPT

## 2021-07-24 PROCEDURE — 99284 EMERGENCY DEPT VISIT MOD MDM: CPT

## 2021-07-24 RX ORDER — 0.9 % SODIUM CHLORIDE 0.9 %
1000 INTRAVENOUS SOLUTION INTRAVENOUS ONCE
Status: COMPLETED | OUTPATIENT
Start: 2021-07-24 | End: 2021-07-25

## 2021-07-24 RX ORDER — KETOROLAC TROMETHAMINE 15 MG/ML
15 INJECTION, SOLUTION INTRAMUSCULAR; INTRAVENOUS ONCE
Status: COMPLETED | OUTPATIENT
Start: 2021-07-24 | End: 2021-07-24

## 2021-07-24 RX ADMIN — KETOROLAC TROMETHAMINE 15 MG: 15 INJECTION, SOLUTION INTRAMUSCULAR; INTRAVENOUS at 23:51

## 2021-07-24 RX ADMIN — SODIUM CHLORIDE 1000 ML: 9 INJECTION, SOLUTION INTRAVENOUS at 23:52

## 2021-07-24 ASSESSMENT — PAIN SCALES - GENERAL: PAINLEVEL_OUTOF10: 8

## 2021-07-25 ENCOUNTER — APPOINTMENT (OUTPATIENT)
Dept: GENERAL RADIOLOGY | Age: 71
DRG: 698 | End: 2021-07-25
Payer: MEDICARE

## 2021-07-25 ENCOUNTER — APPOINTMENT (OUTPATIENT)
Dept: CT IMAGING | Age: 71
DRG: 698 | End: 2021-07-25
Payer: MEDICARE

## 2021-07-25 PROBLEM — N39.0 UTI (URINARY TRACT INFECTION): Status: ACTIVE | Noted: 2021-07-25

## 2021-07-25 LAB
ABO/RH: NORMAL
ALBUMIN SERPL-MCNC: 2.8 G/DL (ref 3.5–5.2)
ALP BLD-CCNC: 226 U/L (ref 40–129)
ALT SERPL-CCNC: 15 U/L (ref 0–40)
ANION GAP SERPL CALCULATED.3IONS-SCNC: 10 MMOL/L (ref 7–16)
ANISOCYTOSIS: ABNORMAL
ANTIBODY SCREEN: NORMAL
AST SERPL-CCNC: 31 U/L (ref 0–39)
BACTERIA: ABNORMAL /HPF
BASOPHILS ABSOLUTE: 0 E9/L (ref 0–0.2)
BASOPHILS RELATIVE PERCENT: 0.2 % (ref 0–2)
BILIRUB SERPL-MCNC: 0.8 MG/DL (ref 0–1.2)
BILIRUBIN URINE: NEGATIVE
BLOOD BANK DISPENSE STATUS: NORMAL
BLOOD BANK PRODUCT CODE: NORMAL
BLOOD, URINE: ABNORMAL
BPU ID: NORMAL
BUN BLDV-MCNC: 21 MG/DL (ref 6–23)
CALCIUM SERPL-MCNC: 8 MG/DL (ref 8.6–10.2)
CHLORIDE BLD-SCNC: 105 MMOL/L (ref 98–107)
CHOLESTEROL, TOTAL: 97 MG/DL (ref 0–199)
CLARITY: ABNORMAL
CO2: 21 MMOL/L (ref 22–29)
COLOR: YELLOW
CREAT SERPL-MCNC: 1.2 MG/DL (ref 0.7–1.2)
DESCRIPTION BLOOD BANK: NORMAL
EOSINOPHILS ABSOLUTE: 0.09 E9/L (ref 0.05–0.5)
EOSINOPHILS RELATIVE PERCENT: 0.9 % (ref 0–6)
GFR AFRICAN AMERICAN: >60
GFR NON-AFRICAN AMERICAN: 59 ML/MIN/1.73
GLUCOSE BLD-MCNC: 119 MG/DL (ref 74–99)
GLUCOSE URINE: NEGATIVE MG/DL
HBA1C MFR BLD: 4.4 % (ref 4–5.6)
HCT VFR BLD CALC: 22.4 % (ref 37–54)
HCT VFR BLD CALC: 22.9 % (ref 37–54)
HDLC SERPL-MCNC: 61 MG/DL
HEMOGLOBIN: 6.9 G/DL (ref 12.5–16.5)
HEMOGLOBIN: 7.2 G/DL (ref 12.5–16.5)
HYPOCHROMIA: ABNORMAL
KETONES, URINE: NEGATIVE MG/DL
LACTIC ACID: 1.7 MMOL/L (ref 0.5–2.2)
LDL CHOLESTEROL CALCULATED: 29 MG/DL (ref 0–99)
LEUKOCYTE ESTERASE, URINE: ABNORMAL
LYMPHOCYTES ABSOLUTE: 0.2 E9/L (ref 1.5–4)
LYMPHOCYTES RELATIVE PERCENT: 1.7 % (ref 20–42)
MAGNESIUM: 1.6 MG/DL (ref 1.6–2.6)
MCH RBC QN AUTO: 31.2 PG (ref 26–35)
MCHC RBC AUTO-ENTMCNC: 32.1 % (ref 32–34.5)
MCV RBC AUTO: 97 FL (ref 80–99.9)
MONOCYTES ABSOLUTE: 0.1 E9/L (ref 0.1–0.95)
MONOCYTES RELATIVE PERCENT: 0.9 % (ref 2–12)
NEUTROPHILS ABSOLUTE: 9.89 E9/L (ref 1.8–7.3)
NEUTROPHILS RELATIVE PERCENT: 96.5 % (ref 43–80)
NITRITE, URINE: NEGATIVE
OVALOCYTES: ABNORMAL
PDW BLD-RTO: 15.8 FL (ref 11.5–15)
PH UA: 5 (ref 5–9)
PHOSPHORUS: 3.4 MG/DL (ref 2.5–4.5)
PLATELET # BLD: 85 E9/L (ref 130–450)
PLATELET CONFIRMATION: NORMAL
PMV BLD AUTO: 11 FL (ref 7–12)
POIKILOCYTES: ABNORMAL
POLYCHROMASIA: ABNORMAL
POTASSIUM REFLEX MAGNESIUM: 4.1 MMOL/L (ref 3.5–5)
PROTEIN UA: ABNORMAL MG/DL
RBC # BLD: 2.31 E12/L (ref 3.8–5.8)
RBC UA: ABNORMAL /HPF (ref 0–2)
SARS-COV-2, NAAT: NOT DETECTED
SEDIMENTATION RATE, ERYTHROCYTE: 60 MM/HR (ref 0–15)
SODIUM BLD-SCNC: 136 MMOL/L (ref 132–146)
SPECIFIC GRAVITY UA: 1.01 (ref 1–1.03)
TEAR DROP CELLS: ABNORMAL
TOTAL PROTEIN: 5.7 G/DL (ref 6.4–8.3)
TRIGL SERPL-MCNC: 36 MG/DL (ref 0–149)
TSH SERPL DL<=0.05 MIU/L-ACNC: 2.66 UIU/ML (ref 0.27–4.2)
UROBILINOGEN, URINE: 0.2 E.U./DL
VLDLC SERPL CALC-MCNC: 7 MG/DL
WBC # BLD: 10.2 E9/L (ref 4.5–11.5)
WBC UA: ABNORMAL /HPF (ref 0–5)

## 2021-07-25 PROCEDURE — 99222 1ST HOSP IP/OBS MODERATE 55: CPT | Performed by: SURGERY

## 2021-07-25 PROCEDURE — 87040 BLOOD CULTURE FOR BACTERIA: CPT

## 2021-07-25 PROCEDURE — 6360000002 HC RX W HCPCS: Performed by: INTERNAL MEDICINE

## 2021-07-25 PROCEDURE — P9016 RBC LEUKOCYTES REDUCED: HCPCS

## 2021-07-25 PROCEDURE — 80061 LIPID PANEL: CPT

## 2021-07-25 PROCEDURE — 99223 1ST HOSP IP/OBS HIGH 75: CPT | Performed by: INTERNAL MEDICINE

## 2021-07-25 PROCEDURE — 87186 SC STD MICRODIL/AGAR DIL: CPT

## 2021-07-25 PROCEDURE — 74177 CT ABD & PELVIS W/CONTRAST: CPT

## 2021-07-25 PROCEDURE — 81001 URINALYSIS AUTO W/SCOPE: CPT

## 2021-07-25 PROCEDURE — 2580000003 HC RX 258: Performed by: EMERGENCY MEDICINE

## 2021-07-25 PROCEDURE — 87077 CULTURE AEROBIC IDENTIFY: CPT

## 2021-07-25 PROCEDURE — 71046 X-RAY EXAM CHEST 2 VIEWS: CPT

## 2021-07-25 PROCEDURE — 85018 HEMOGLOBIN: CPT

## 2021-07-25 PROCEDURE — 83735 ASSAY OF MAGNESIUM: CPT

## 2021-07-25 PROCEDURE — 86901 BLOOD TYPING SEROLOGIC RH(D): CPT

## 2021-07-25 PROCEDURE — 6360000002 HC RX W HCPCS: Performed by: EMERGENCY MEDICINE

## 2021-07-25 PROCEDURE — 84100 ASSAY OF PHOSPHORUS: CPT

## 2021-07-25 PROCEDURE — 86850 RBC ANTIBODY SCREEN: CPT

## 2021-07-25 PROCEDURE — 36430 TRANSFUSION BLD/BLD COMPNT: CPT

## 2021-07-25 PROCEDURE — 36415 COLL VENOUS BLD VENIPUNCTURE: CPT

## 2021-07-25 PROCEDURE — 1200000000 HC SEMI PRIVATE

## 2021-07-25 PROCEDURE — 51798 US URINE CAPACITY MEASURE: CPT

## 2021-07-25 PROCEDURE — 86900 BLOOD TYPING SEROLOGIC ABO: CPT

## 2021-07-25 PROCEDURE — 83036 HEMOGLOBIN GLYCOSYLATED A1C: CPT

## 2021-07-25 PROCEDURE — 86923 COMPATIBILITY TEST ELECTRIC: CPT

## 2021-07-25 PROCEDURE — 2580000003 HC RX 258: Performed by: INTERNAL MEDICINE

## 2021-07-25 PROCEDURE — 87088 URINE BACTERIA CULTURE: CPT

## 2021-07-25 PROCEDURE — 85651 RBC SED RATE NONAUTOMATED: CPT

## 2021-07-25 PROCEDURE — 6360000004 HC RX CONTRAST MEDICATION: Performed by: RADIOLOGY

## 2021-07-25 PROCEDURE — 85014 HEMATOCRIT: CPT

## 2021-07-25 PROCEDURE — 84443 ASSAY THYROID STIM HORMONE: CPT

## 2021-07-25 PROCEDURE — 6370000000 HC RX 637 (ALT 250 FOR IP): Performed by: INTERNAL MEDICINE

## 2021-07-25 RX ORDER — ACETAMINOPHEN 500 MG
500 TABLET ORAL EVERY 6 HOURS PRN
Status: DISCONTINUED | OUTPATIENT
Start: 2021-07-25 | End: 2021-07-25

## 2021-07-25 RX ORDER — MAGNESIUM OXIDE 400 MG/1
400 TABLET ORAL DAILY
Status: DISCONTINUED | OUTPATIENT
Start: 2021-07-25 | End: 2021-07-27 | Stop reason: HOSPADM

## 2021-07-25 RX ORDER — ACETAMINOPHEN 325 MG/1
650 TABLET ORAL EVERY 4 HOURS PRN
Status: DISCONTINUED | OUTPATIENT
Start: 2021-07-25 | End: 2021-07-27 | Stop reason: HOSPADM

## 2021-07-25 RX ORDER — DOXYCYCLINE HYCLATE 50 MG/1
324 CAPSULE, GELATIN COATED ORAL
Status: DISCONTINUED | OUTPATIENT
Start: 2021-07-25 | End: 2021-07-25 | Stop reason: CLARIF

## 2021-07-25 RX ORDER — TRAMADOL HYDROCHLORIDE 50 MG/1
50 TABLET ORAL EVERY 4 HOURS PRN
Status: DISCONTINUED | OUTPATIENT
Start: 2021-07-25 | End: 2021-07-27 | Stop reason: HOSPADM

## 2021-07-25 RX ORDER — TAMSULOSIN HYDROCHLORIDE 0.4 MG/1
0.4 CAPSULE ORAL 2 TIMES DAILY
Status: DISCONTINUED | OUTPATIENT
Start: 2021-07-25 | End: 2021-07-27 | Stop reason: HOSPADM

## 2021-07-25 RX ORDER — FUROSEMIDE 20 MG/1
20 TABLET ORAL DAILY
Status: DISCONTINUED | OUTPATIENT
Start: 2021-07-25 | End: 2021-07-27 | Stop reason: HOSPADM

## 2021-07-25 RX ORDER — SODIUM CHLORIDE 9 MG/ML
INJECTION, SOLUTION INTRAVENOUS ONCE
Status: COMPLETED | OUTPATIENT
Start: 2021-07-25 | End: 2021-07-25

## 2021-07-25 RX ORDER — QUINIDINE GLUCONATE 324 MG
240 TABLET, EXTENDED RELEASE ORAL
Status: DISCONTINUED | OUTPATIENT
Start: 2021-07-25 | End: 2021-07-27 | Stop reason: HOSPADM

## 2021-07-25 RX ORDER — PANTOPRAZOLE SODIUM 40 MG/1
40 TABLET, DELAYED RELEASE ORAL DAILY
Status: DISCONTINUED | OUTPATIENT
Start: 2021-07-25 | End: 2021-07-27 | Stop reason: HOSPADM

## 2021-07-25 RX ORDER — FUROSEMIDE 10 MG/ML
20 INJECTION INTRAMUSCULAR; INTRAVENOUS ONCE
Status: COMPLETED | OUTPATIENT
Start: 2021-07-25 | End: 2021-07-25

## 2021-07-25 RX ORDER — ONDANSETRON 4 MG/1
4 TABLET, FILM COATED ORAL EVERY 8 HOURS PRN
Status: DISCONTINUED | OUTPATIENT
Start: 2021-07-25 | End: 2021-07-27 | Stop reason: HOSPADM

## 2021-07-25 RX ORDER — HYDROCHLOROTHIAZIDE 25 MG/1
25 TABLET ORAL DAILY
Status: DISCONTINUED | OUTPATIENT
Start: 2021-07-25 | End: 2021-07-27 | Stop reason: HOSPADM

## 2021-07-25 RX ORDER — SODIUM CHLORIDE 9 MG/ML
INJECTION, SOLUTION INTRAVENOUS PRN
Status: DISCONTINUED | OUTPATIENT
Start: 2021-07-25 | End: 2021-07-27 | Stop reason: HOSPADM

## 2021-07-25 RX ORDER — POLYETHYLENE GLYCOL 3350 17 G/17G
17 POWDER, FOR SOLUTION ORAL DAILY
Status: DISCONTINUED | OUTPATIENT
Start: 2021-07-25 | End: 2021-07-27 | Stop reason: HOSPADM

## 2021-07-25 RX ORDER — DOCUSATE SODIUM 100 MG/1
100 CAPSULE, LIQUID FILLED ORAL 2 TIMES DAILY
Status: DISCONTINUED | OUTPATIENT
Start: 2021-07-25 | End: 2021-07-27 | Stop reason: HOSPADM

## 2021-07-25 RX ORDER — DEXTROSE AND SODIUM CHLORIDE 5; .45 G/100ML; G/100ML
INJECTION, SOLUTION INTRAVENOUS CONTINUOUS
Status: DISCONTINUED | OUTPATIENT
Start: 2021-07-25 | End: 2021-07-26

## 2021-07-25 RX ADMIN — TRAMADOL HYDROCHLORIDE 50 MG: 50 TABLET ORAL at 16:09

## 2021-07-25 RX ADMIN — MAGNESIUM OXIDE 400 MG: 400 TABLET ORAL at 08:53

## 2021-07-25 RX ADMIN — IOPAMIDOL 75 ML: 755 INJECTION, SOLUTION INTRAVENOUS at 00:32

## 2021-07-25 RX ADMIN — CEFTRIAXONE 1000 MG: 1 INJECTION, POWDER, FOR SOLUTION INTRAMUSCULAR; INTRAVENOUS at 02:58

## 2021-07-25 RX ADMIN — CEFTRIAXONE 1000 MG: 1 INJECTION, POWDER, FOR SOLUTION INTRAMUSCULAR; INTRAVENOUS at 21:11

## 2021-07-25 RX ADMIN — SODIUM CHLORIDE: 9 INJECTION, SOLUTION INTRAVENOUS at 02:58

## 2021-07-25 RX ADMIN — TAMSULOSIN HYDROCHLORIDE 0.4 MG: 0.4 CAPSULE ORAL at 08:53

## 2021-07-25 RX ADMIN — FUROSEMIDE 20 MG: 10 INJECTION INTRAMUSCULAR; INTRAVENOUS at 13:13

## 2021-07-25 RX ADMIN — TAMSULOSIN HYDROCHLORIDE 0.4 MG: 0.4 CAPSULE ORAL at 21:11

## 2021-07-25 RX ADMIN — PANTOPRAZOLE SODIUM 40 MG: 40 TABLET, DELAYED RELEASE ORAL at 08:53

## 2021-07-25 RX ADMIN — FUROSEMIDE 20 MG: 20 TABLET ORAL at 08:53

## 2021-07-25 RX ADMIN — DEXTROSE AND SODIUM CHLORIDE: 5; 450 INJECTION, SOLUTION INTRAVENOUS at 08:51

## 2021-07-25 RX ADMIN — ACETAMINOPHEN 650 MG: 325 TABLET ORAL at 08:53

## 2021-07-25 ASSESSMENT — PAIN SCALES - GENERAL
PAINLEVEL_OUTOF10: 0
PAINLEVEL_OUTOF10: 4
PAINLEVEL_OUTOF10: 0
PAINLEVEL_OUTOF10: 7

## 2021-07-25 NOTE — CONSULTS
Department of [facility] Oncology  Attending Consult Note      Reason for Visit:   Febrile and anemia in known rectal cancer patient     Referring Physician:  Dr Orrie Fleischer   PCP:  Maris Galaviz MD    History of Present Illness:  66year old male with known metastatic rectal cancer diagnosed on colonoscopy in 2015 , Invasive adenocarcinoma involving villous adenoma and extending to the cauterized edge of excision. KRAS Mutation: Mutation detected. BRAF Mutation: Mutation not detected. 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 CEA 3488. Bone scan on 11/10/2015 noted no metastatic disease. CT chest on 11/10/2015 revealed Multiple pulmonary nodules in the upper and lower lobes consistent with metastatic disease;   Hepatic metastasis also visualized. For his advanced rectosigmoid cancer, systemic chemotherapy was recommended; FOLFOX + Avastin and started on 11/23/2015. He as changed to FOLFIRI on 08/02/2016 d/t metastasis to the bone. HE received cycle 110 of FOLFIRI on 06/08/2021, Avastin has been held d/t known surgery-last dose was 05/11/2021. HE had right inguinal/scrotol hernia repair on 07/13/2021 with  [facility] with folely removal on Friday 07/23/2021  He developed fever per patient up to 106 over night on 07/24/2021 and was brought to [address] [place], [place] [address] ER via EMS-he received tylenol via EMS for fever of 106. Reports chills and fever-like feelings but denies SOB, chest pain, nausea, vomiting, dysuria or abdominal pain. Reports some swelling to surgical site, no redness or drainage. Upon arrival;   Temperature of 103.3  UA with large leuk esterase and moderate bacteria and started on Rocphin. Labs significant for hgb of 7.2 and then 6.9 on AM labs-1 unit pRBC ordered. Plts of 85. No leukocytosis   Blood cultures sent and pending     Patient has chronic anemia d/t malignancy with chemo treatment, chronic inflammation and UTI.  Patient has been afebrile today     Review of Systems;  CONSTITUTIONAL: Reports fevers  And chills and fatigue. Respiratory: Denies cough, SOB or hemoptysis  Cardiac: Denies chest pain or palpitation  Abdominal: denies nausea or vomiting   GI: Denies constipation or diarrhea or hematochezia  : Denies dysuria or hematuria  Skin: Reports swelling to surgical site, denies rash lesions or drainage  Neuro: Denies syncope or headache     Review of Systems    Remainder:  ROS NEGATIVE    Past Medical History:      Diagnosis Date    Arthritis     Cancer ([facility] [address]     colon    Depression     History of blood transfusion     st Joes    Hyperlipidemia     Hypertension     Prolonged emergence from general anesthesia     Retention of urine        Past Surgical History:      Procedure Laterality Date    CHOLECYSTECTOMY, LAPAROSCOPIC N/A 10/27/2020    CHOLECYSTECTOMY LAPAROSCOPIC WITH IOC, ROBOT XI ASSISTED, POSS OPEN performed by Colletta Platts, MD at [address]  11/2/15    bx, rectal mass    FRACTURE SURGERY      HERNIA REPAIR Right 7/13/2021    OPEN RIGHT INGUINAL HERNIA REPAIR WITH MESH (CPT 11694) performed by Colletta Platts, MD at [address] Right     LIVER BIOPSY  11/12/15    SKIN FULL GRAFT      on finger    TONSILLECTOMY Bilateral     TUNNELED CENTRAL VENOUS CATHETER W/ SUBCUTANEOUS PORT Left        Family History:  Family History   Problem Relation Age of Onset    Cancer Mother         breast cancer    Cancer Father         malignant brain tumor    Diabetes Brother        Medications:  Reviewed and reconciled. Social History:  Social History     Socioeconomic History    Marital status:       Spouse name: Not on file    Number of children: 1    Years of education: Not on file    Highest education level: Not on file   Occupational History    Occupation: Worked for [employer]     Employer: RETIRED   Tobacco Use    Smoking status: Former Smoker     Packs/day: 1.00     Years: 40.00     Pack years: 40.00 BS  Extremities: NO swelling clubbing or cyanosis   Skin; no rash or lesion  Neuro: Oriented x3    ECOG PS 1    Impression/Plan:  66year old male with known metastatic rectal cancer diagnosed on colonoscopy in 2015 , Invasive adenocarcinoma involving villous adenoma and extending to the cauterized edge of excision. KRAS Mutation: Mutation detected. BRAF Mutation: Mutation not detected. 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 CEA 3488. Bone scan on 11/10/2015 noted no metastatic disease. CT chest on 11/10/2015 revealed Multiple pulmonary nodules in the upper and lower lobes consistent with metastatic disease;   Hepatic metastasis also visualized. For his advanced rectosigmoid cancer, systemic chemotherapy was recommended; FOLFOX + Avastin and started on 11/23/2015. He as changed to FOLFIRI on 08/02/2016 d/t metastasis to the bone. HE received cycle 110 of FOLFIRI on 06/08/2021, Avastin has been held d/t known surgery-last dose was 05/11/2021. HE had right inguinal/scrotol hernia repair on 07/13/2021 with Dr Gertrude Fernando with folely removal on Friday 07/23/2021  He developed fever per patient up to 106 over night on 07/24/2021 [facility]he received tylenol via EMS for fever of 106. Reports chills and fever-like feelings but denies SOB, chest pain, nausea, vomiting, dysuria or abdominal pain. Reports some swelling to surgical site, no redness or drainage. Upon arrival   UA with large leuk esterase and moderate bacteria and started on Rocphin. Labs significant for hgb of 7.2 and then 6.9 on AM labs-1 unit pRBC ordered. Plts of 85. No leukocytosis   Blood cultures sent and pending     Patient has chronic anemia d/t malignancy,chemo, chronic inflammation and recent surgery,UTI    -Continue abx per primary team-follow blood cultures.    -General surgery consulted-no intervention  -Urology consult appreciated   -Monitor CBCD, transfuse to keep hgb>7 and plt>10. 1 unit prbc today for hgb of 6.9   -Continue to hold chemo, will follow up outpatient   -We will continue to follow    Thank you for allowing us to participate in the care of Erik Hull [facility]   [phone]     The patient was seen and examined, I agree with MANOJ Culver's H&P,  assessment and plan as outlined.      Howard Samuel MD   HEMATOLOGY/MEDICAL ONCOLOGY  [address] [place] [address] [address] [facility]  [address] [place] [id number]  Dept: Elías Lanier 149: [phone]

## 2021-07-25 NOTE — ED NOTES
Report called to RN on 4th floor     Chadd MathewChan [facility]  07/25/21 9202

## 2021-07-25 NOTE — CONSULTS
Carlota Barry  7/25/2021  [facility] office visit    Carlota Barry is a 66 y.o. male admitted with fevers, had hernia surgery 2 weeks ago, no pain, no sign of infection. Has urinary obstructions despite Flomax and developed a UTI, started on rocephin in the ER, no fever now, WBC normal. Hgb always low, runs between 7.0-8.5, yesterday 7.2, dropped to 6.9 this morning. History: open right inguinal hernia repair with mesh 7/13/21, discharged with álvarez catheter due to history of urinary obstruction, removed 7/23/21. He didn't elevated the buttocks or legs post op due to back pain so the leg and buttocks swelled with moderate skin bruising of the groin. Wound healing well. Weight: 190 lb 3.2 oz (86.3 kg). Past Medical History:   Diagnosis Date    Arthritis     Cancer ([address]     colon    Depression     History of blood transfusion     st Joes    Hyperlipidemia     Hypertension     Prolonged emergence from general anesthesia     Retention of urine      Past Surgical History:   Procedure Laterality Date    CHOLECYSTECTOMY, LAPAROSCOPIC N/A 10/27/2020    CHOLECYSTECTOMY LAPAROSCOPIC WITH IOC, ROBOT XI ASSISTED, POSS OPEN performed by Ossie Rinne, MD at [facility] [address]  11/2/15    bx, rectal mass    FRACTURE SURGERY      HERNIA REPAIR Right 7/13/2021    OPEN RIGHT INGUINAL HERNIA REPAIR WITH MESH (CPT 72770) performed by Ossie Rinne, MD at [facility] ARTHROSCOPY Right     LIVER BIOPSY  11/12/15    SKIN FULL GRAFT      on finger    TONSILLECTOMY Bilateral     TUNNELED CENTRAL VENOUS CATHETER W/ SUBCUTANEOUS PORT Left        Home Medications  Prior to Visit Medications    Medication Sig Taking?  Authorizing Provider   hydrocortisone 2.5 % cream APPLY TO AFFECTED AREA, NOT TO FACE OR GENITAL AREAS, NOT TO EXCEED 4 WKS OF USE Yes Historical Provider, MD   Magic Mouthwash (MIRACLE MOUTHWASH) Swish and swallow 15 mLs 4 times daily as needed for Irritation Equal Amts of Benadryl,Maalox, and Xylocaine Yes RUBEN Carrillo - CNP   ferrous gluconate (FERGON) 324 (38 Fe) MG tablet Take 324 mg by mouth daily (with breakfast) Yes Historical Provider, MD   tamsulosin (FLOMAX) 0.4 MG capsule Take 2 capsules by mouth daily  Patient taking differently: Take 0.4 mg by mouth 2 times daily  Yes Kenzie Hawkins MD   ondansetron (ZOFRAN) 4 MG tablet Take 1 tablet by mouth every 8 hours as needed for Nausea or Vomiting Yes RUBEN Carrillo - CNP   furosemide (LASIX) 20 MG tablet Take 20 mg by mouth daily Indications: pt is taken this med 7 days a week for now  Yes Historical Provider, MD   magnesium oxide (MAG-OX) 400 (240 Mg) MG tablet Take 1 tablet by mouth 2 times daily  Patient taking differently: Take 400 mg by mouth daily  Yes Madina Coley MD   potassium chloride (KLOR-CON M) 20 MEQ extended release tablet Take 40 mEq by mouth daily  Yes Historical Provider, MD   loratadine (CLARITIN) 10 MG tablet Take 10 mg by mouth See Admin Instructions Takes the week of chemotherapy (Last dose: 5/9; Next dose: 5/26) Yes Historical Provider, MD   pantoprazole (PROTONIX) 40 MG tablet Take 40 mg by mouth daily Yes Historical Provider, MD   b complex vitamins capsule Take 1 capsule by mouth daily Yes Historical Provider, MD   diphenoxylate-atropine (LOMOTIL) 2.5-0.025 MG per tablet Take 1 tablet by mouth 4 times daily as needed for Diarrhea. Yes Historical Provider, MD   polyethylene glycol (GLYCOLAX) packet Take 17 g by mouth daily as needed for Constipation Yes Historical Provider, MD   hydrochlorothiazide (HYDRODIURIL) 25 MG tablet Take 1 tablet by mouth daily Yes Paula Rose APRN - CNP   hydrocortisone (ANUSOL-HC) 2.5 % rectal cream Use 3-4 times daily. Do not use internally. External use only.  Yes RUBEN Caballero - CNP   acetaminophen (TYLENOL) 500 MG tablet Take 500 mg by mouth every 6 hours as needed for Pain Yes Historical Provider, MD   traMADol (ULTRAM) 50 MG tablet TAKE 1 TABLET BY MOUTH EVERY 4 TO 6 HOURS AS NEEDED FOR PAIN  Historical Provider, MD   lactulose (CHRONULAC) 10 GM/15ML solution Take 20 g by mouth as needed   Historical Provider, MD       Allergies: Neosporin [neomycin-polymyxin-gramicidin] and Tape [adhesive tape]     Physical Exam:   VITALS: Blood pressure (!) 90/55, pulse 62, temperature 97.3 °F (36.3 °C), temperature source Infrared, resp. rate 18, height 6' 1\" (1.854 m), weight 190 lb 3.2 oz (86.3 kg), SpO2 99 %. General appearance: alert, appears stated age and cooperative, does ambulate easily  Head: Normocephalic, without obvious abnormality, atraumatic  Eyes: PERRL  Ears/mouth/throat:  Ears clear, mouth normal, throat no redness  Neck: no adenopathy, no JVD, supple, symmetrical, trachea midline and thyroid not enlarged  Lungs: clear to auscultation bilaterally  Heart: regular rate and rhythm  Abdomen: incision healing well, moderate skin bruising down through the scrotum  Extremities: 1 + leg edema  Skin: no open wounds    Assessment:    UTI better with antibiotics  Hgb always low. Plan:   Per medicine.     Physician Signature: Electronically signed by Dr. Eduin Wiggins MD

## 2021-07-25 NOTE — ED NOTES
Bed: 03  Expected date:   Expected time:   Means of arrival:   Comments:     Geovany Pagan RN  07/24/21 3366

## 2021-07-25 NOTE — ED PROVIDER NOTES
HPI:  7/25/21,   Time: 2:15 AM EDT        Verónica Villegas is a 66 y.o. male presenting to the ED for fever. Patient admitted to just generally not feeling well and having chills which began earlier today. This evening because EMS. EMS states on their arrival temperature was 106. Patient was given Tylenol in route by EMS. On arrival patient states he recently had surgery for inguinal hernia repair on 7/13, states he is still having some swelling to the surgical site but denies any significant no acute pain to the area or any drainage or discharge from the area. Patient denies any cough congestion or cold-like symptoms, no chest pain or shortness of breath. Denies nausea vomiting or abdominal pain. No dysuria. No other complaints. ROS:   GEN: + fevers, + chills, + fatigue and myalgias  HENT: No trauma, no sore throat, no swelling throat or oral mucosa  EYES: No changes in vision, no pain  CARDIO: No chest pain, no palpitations  PULM: No trouble breathing, no wheezing  ABD: No pain, no nausea, no vomiting  MSK: No pain, no trauma, no deformities  SKIN: No rashes, no abrasions, no lacerations + surgical wound right inguinal hernia area  NEURO: No changes in mentation, no headache, no weakness     --------------------------------------------- PAST HISTORY ---------------------------------------------  Past Medical History:  has a past medical history of Arthritis, Cancer ([facility] [address] Depression, History of blood transfusion, Hyperlipidemia, Hypertension, Prolonged emergence from general anesthesia, and Retention of urine. Past Surgical History:  has a past surgical history that includes Knee arthroscopy (Right); Tonsillectomy (Bilateral); Colonoscopy; Colonoscopy (11/2/15); liver biopsy (11/12/15); skin full graft; fracture surgery; TUNNELED CENTRAL VENOUS CATHETER W/ SUBCUTANEOUS PORT (Left); Cholecystectomy, laparoscopic (N/A, 10/27/2020); and hernia repair (Right, 7/13/2021).     Social History:  reports that he quit smoking about 20 years ago. His smoking use included cigarettes. He has a 40.00 pack-year smoking history. He has never used smokeless tobacco. He reports that he does not drink alcohol and does not use drugs. Family History: family history includes Cancer in his father and mother; Diabetes in his brother. The patients home medications have been reviewed.     Allergies: Neosporin [neomycin-polymyxin-gramicidin] and Tape [adhesive tape]    -------------------------------------------------- RESULTS -------------------------------------------------  All laboratory and radiology results have been personally reviewed by myself   LABS:  Results for orders placed or performed during the hospital encounter of 07/24/21   COVID-19, Rapid    Specimen: Nasopharyngeal Swab   Result Value Ref Range    SARS-CoV-2, NAAT Not Detected Not Detected   CBC Auto Differential   Result Value Ref Range    WBC 10.2 4.5 - 11.5 E9/L    RBC 2.31 (L) 3.80 - 5.80 E12/L    Hemoglobin 7.2 (L) 12.5 - 16.5 g/dL    Hematocrit 22.4 (L) 37.0 - 54.0 %    MCV 97.0 80.0 - 99.9 fL    MCH 31.2 26.0 - 35.0 pg    MCHC 32.1 32.0 - 34.5 %    RDW 15.8 (H) 11.5 - 15.0 fL    Platelets 85 (L) 975 - 450 E9/L    MPV 11.0 7.0 - 12.0 fL    Neutrophils % 96.5 (H) 43.0 - 80.0 %    Lymphocytes % 1.7 (L) 20.0 - 42.0 %    Monocytes % 0.9 (L) 2.0 - 12.0 %    Eosinophils % 0.9 0.0 - 6.0 %    Basophils % 0.2 0.0 - 2.0 %    Neutrophils Absolute 9.89 (H) 1.80 - 7.30 E9/L    Lymphocytes Absolute 0.20 (L) 1.50 - 4.00 E9/L    Monocytes Absolute 0.10 0.10 - 0.95 E9/L    Eosinophils Absolute 0.09 0.05 - 0.50 E9/L    Basophils Absolute 0.00 0.00 - 0.20 E9/L    Anisocytosis 1+     Polychromasia 2+     Hypochromia 1+     Poikilocytes 1+     Ovalocytes 1+     Tear Drop Cells 1+    Comprehensive Metabolic Panel w/ Reflex to MG   Result Value Ref Range    Sodium 136 132 - 146 mmol/L    Potassium reflex Magnesium 4.1 3.5 - 5.0 mmol/L    Chloride 105 98 - 107 mmol/L    CO2 21 (L) 22 - 29 mmol/L    Anion Gap 10 7 - 16 mmol/L    Glucose 119 (H) 74 - 99 mg/dL    BUN 21 6 - 23 mg/dL    CREATININE 1.2 0.7 - 1.2 mg/dL    GFR Non-African American 59 >=60 mL/min/1.73    GFR African American >60     Calcium 8.0 (L) 8.6 - 10.2 mg/dL    Total Protein 5.7 (L) 6.4 - 8.3 g/dL    Albumin 2.8 (L) 3.5 - 5.2 g/dL    Total Bilirubin 0.8 0.0 - 1.2 mg/dL    Alkaline Phosphatase 226 (H) 40 - 129 U/L    ALT 15 0 - 40 U/L    AST 31 0 - 39 U/L   Lactic Acid, Plasma   Result Value Ref Range    Lactic Acid 1.7 0.5 - 2.2 mmol/L   Urinalysis, reflex to microscopic   Result Value Ref Range    Color, UA Yellow Straw/Yellow    Clarity, UA CLOUDY (A) Clear    Glucose, Ur Negative Negative mg/dL    Bilirubin Urine Negative Negative    Ketones, Urine Negative Negative mg/dL    Specific Gravity, UA 1.010 1.005 - 1.030    Blood, Urine MODERATE (A) Negative    pH, UA 5.0 5.0 - 9.0    Protein, UA TRACE Negative mg/dL    Urobilinogen, Urine 0.2 <2.0 E.U./dL    Nitrite, Urine Negative Negative    Leukocyte Esterase, Urine LARGE (A) Negative   Platelet Confirmation   Result Value Ref Range    Platelet Confirmation CONFIRMED    Microscopic Urinalysis   Result Value Ref Range    WBC, UA PACKED (A) 0 - 5 /HPF    RBC, UA 2-5 0 - 2 /HPF    Bacteria, UA MODERATE (A) None Seen /HPF       RADIOLOGY:  Interpreted by Radiologist.  XR CHEST (2 VW)   Final Result   Similar chronic appearing findings. Short-term follow-up recommended if   symptoms persist.         CT ABDOMEN PELVIS W IV CONTRAST Additional Contrast? None   Final Result   Focal area of soft tissue thickening in the subcutaneous aspect of the lower   anterior right abdominal wall which may represent focal hematoma. Large right hydrocele and small left hydrocele. Along the expected location of the spermatic cord in the right inguinal   region is a heterogeneous area of soft tissue thickening with tiny focus of   gas.   Given the history, these could be postoperative and related to an area   of hematoma. Phlegmon or developing infectious process not entirely   excluded. Follow-up would be useful. Fat containing left inguinal hernia. Urinary bladder thickening. Correlate for cystitis. Splenomegaly. Stable hepatic lesion which may represent metastatic disease. Continued   follow-up recommended. ------------------------- NURSING NOTES AND VITALS REVIEWED ---------------------------   The nursing notes within the ED encounter and vital signs as below have been reviewed. BP (!) 101/54   Pulse 81   Temp 100.7 °F (38.2 °C) (Oral)   Resp 14   SpO2 95%   Oxygen Saturation Interpretation: Normal    ---------------------------------------------------PHYSICAL EXAM--------------------------------------    GEN: No acute distress, well-appearing, well nourished   HENT: Normocephalic, atraumatic, oral mucosa moist  EYES: No scleral injection, no scleral icterus, PERRL   PULM: Lungs clear to ascultation bilaterally, no wheezes, no crackles  CARDIO: Regular rate, regular rhythm, normal S1/S2  ABD: Soft, no rigidity, no guarding, no distention.  + Mild tenderness to palpation over right inguinal region and area of recent surgical hernia repair, no other abdominal tenderness  MSK: No deformities, no edema, palpable pulses all extremities   SKIN: No rashes, no lacerations, no abrasions.   Surgical wound right inguinal region well-appearing, no dehiscence, no purulence, mild edema and tenderness to the area without overlying erythema  NEURO: Awake, alert, appropriate         ------------------------------ ED COURSE/MEDICAL DECISION MAKING----------------------  Medications   cefTRIAXone (ROCEPHIN) 1,000 mg in sterile water 10 mL IV syringe (has no administration in time range)   0.9 % sodium chloride infusion (has no administration in time range)   acetaminophen (TYLENOL) tablet 650 mg (has no administration in time range)   0.9 % sodium chloride bolus (1,000 mLs Intravenous New Bag 7/24/21 2352)   ketorolac (TORADOL) injection 15 mg (15 mg Intravenous Given 7/24/21 9931)   iopamidol (ISOVUE-370) 76 % injection 75 mL (75 mLs Intravenous Given 7/25/21 0032)         ED Course and   2:18 AM EDT  Spoke with surgery, Dr. Rick Hanna, regarding case and CT findings. No obvious infectious process at site of recent hernia repair. No recommendations at this time. Requested admission to medicine for ongoing evaluation and management, surgery will consult on the case. 2:37 AM EDT  Spoke with medicine, Dr. Alec Campos, case discussed, accepted for admission. Medical Decision Making:   Patient presents with fever. Patient nontoxic well-appearing hemodynamically stable throughout ED stay. Site of surgical wound with mild tenderness and swelling but no erythema dehiscence or purulence, unlikely source of infection but cannot rule out based on CT findings. Patient does have urinalysis consistent with UTI which is likely source of fever. No leukocytosis. Patient started on IV Rocephin in emergency department. Urine sent for culture. Lactic acid within normal limits. Blood pressure stable throughout ED stay. Patient admitted for ongoing evaluation and management. Patient is agreeable to plan.       --------------------------------- ADDITIONAL PROVIDER NOTES ---------------------------------    This patient's ED course included: re-evaluation prior to disposition and a personal history and physicial eaxmination    This patient has remained hemodynamically stable during their ED course. Counseling: The emergency provider has spoken with the patient and discussed todays results, in addition to providing specific details for the plan of care and counseling regarding the diagnosis and prognosis.   Questions are answered at this time and they are agreeable with the plan.      --------------------------------- IMPRESSION AND DISPOSITION ---------------------------------    IMPRESSION  1. Acute cystitis without hematuria    2.  Fever, unspecified fever cause        DISPOSITION  Disposition: Admit to med/surg floor  Patient condition is good        Klarissa Lucio DO  07/25/21 4701 Alert and oriented to person, place and time

## 2021-07-26 LAB
ALBUMIN SERPL-MCNC: 2.6 G/DL (ref 3.5–5.2)
ALP BLD-CCNC: 186 U/L (ref 40–129)
ALT SERPL-CCNC: 15 U/L (ref 0–40)
ANION GAP SERPL CALCULATED.3IONS-SCNC: 9 MMOL/L (ref 7–16)
AST SERPL-CCNC: 30 U/L (ref 0–39)
BASOPHILS ABSOLUTE: 0 E9/L (ref 0–0.2)
BASOPHILS RELATIVE PERCENT: 0.2 % (ref 0–2)
BILIRUB SERPL-MCNC: 0.9 MG/DL (ref 0–1.2)
BUN BLDV-MCNC: 26 MG/DL (ref 6–23)
CALCIUM SERPL-MCNC: 7.2 MG/DL (ref 8.6–10.2)
CHLORIDE BLD-SCNC: 105 MMOL/L (ref 98–107)
CO2: 20 MMOL/L (ref 22–29)
CREAT SERPL-MCNC: 1.1 MG/DL (ref 0.7–1.2)
EOSINOPHILS ABSOLUTE: 0.12 E9/L (ref 0.05–0.5)
EOSINOPHILS RELATIVE PERCENT: 2.6 % (ref 0–6)
FOLATE: >20 NG/ML (ref 4.8–24.2)
GFR AFRICAN AMERICAN: >60
GFR NON-AFRICAN AMERICAN: >60 ML/MIN/1.73
GLUCOSE BLD-MCNC: 88 MG/DL (ref 74–99)
HCT VFR BLD CALC: 22.4 % (ref 37–54)
HEMOGLOBIN: 7.2 G/DL (ref 12.5–16.5)
IRON SATURATION: 12 % (ref 20–55)
IRON: 13 MCG/DL (ref 59–158)
LYMPHOCYTES ABSOLUTE: 0.19 E9/L (ref 1.5–4)
LYMPHOCYTES RELATIVE PERCENT: 3.5 % (ref 20–42)
MCH RBC QN AUTO: 31 PG (ref 26–35)
MCHC RBC AUTO-ENTMCNC: 32.1 % (ref 32–34.5)
MCV RBC AUTO: 96.6 FL (ref 80–99.9)
MONOCYTES ABSOLUTE: 0.09 E9/L (ref 0.1–0.95)
MONOCYTES RELATIVE PERCENT: 1.7 % (ref 2–12)
NEUTROPHILS ABSOLUTE: 4.32 E9/L (ref 1.8–7.3)
NEUTROPHILS RELATIVE PERCENT: 92.2 % (ref 43–80)
OVALOCYTES: ABNORMAL
PDW BLD-RTO: 16 FL (ref 11.5–15)
PLATELET # BLD: 73 E9/L (ref 130–450)
PLATELET CONFIRMATION: NORMAL
PMV BLD AUTO: 12 FL (ref 7–12)
POIKILOCYTES: ABNORMAL
POTASSIUM SERPL-SCNC: 3.8 MMOL/L (ref 3.5–5)
RBC # BLD: 2.32 E12/L (ref 3.8–5.8)
SODIUM BLD-SCNC: 134 MMOL/L (ref 132–146)
TOTAL IRON BINDING CAPACITY: 110 MCG/DL (ref 250–450)
TOTAL PROTEIN: 5.3 G/DL (ref 6.4–8.3)
VITAMIN B-12: 1299 PG/ML (ref 211–946)
WBC # BLD: 4.7 E9/L (ref 4.5–11.5)

## 2021-07-26 PROCEDURE — 97530 THERAPEUTIC ACTIVITIES: CPT | Performed by: PHYSICAL THERAPIST

## 2021-07-26 PROCEDURE — 1200000000 HC SEMI PRIVATE

## 2021-07-26 PROCEDURE — 2580000003 HC RX 258: Performed by: INTERNAL MEDICINE

## 2021-07-26 PROCEDURE — 85025 COMPLETE CBC W/AUTO DIFF WBC: CPT

## 2021-07-26 PROCEDURE — 97161 PT EVAL LOW COMPLEX 20 MIN: CPT | Performed by: PHYSICAL THERAPIST

## 2021-07-26 PROCEDURE — 6370000000 HC RX 637 (ALT 250 FOR IP): Performed by: INTERNAL MEDICINE

## 2021-07-26 PROCEDURE — 80053 COMPREHEN METABOLIC PANEL: CPT

## 2021-07-26 PROCEDURE — 82607 VITAMIN B-12: CPT

## 2021-07-26 PROCEDURE — 99233 SBSQ HOSP IP/OBS HIGH 50: CPT | Performed by: SURGERY

## 2021-07-26 PROCEDURE — 82746 ASSAY OF FOLIC ACID SERUM: CPT

## 2021-07-26 PROCEDURE — 6360000002 HC RX W HCPCS: Performed by: INTERNAL MEDICINE

## 2021-07-26 PROCEDURE — 99233 SBSQ HOSP IP/OBS HIGH 50: CPT | Performed by: INTERNAL MEDICINE

## 2021-07-26 PROCEDURE — 36415 COLL VENOUS BLD VENIPUNCTURE: CPT

## 2021-07-26 PROCEDURE — 83550 IRON BINDING TEST: CPT

## 2021-07-26 PROCEDURE — 83540 ASSAY OF IRON: CPT

## 2021-07-26 RX ORDER — POTASSIUM CHLORIDE AND SODIUM CHLORIDE 900; 300 MG/100ML; MG/100ML
INJECTION, SOLUTION INTRAVENOUS CONTINUOUS
Status: DISCONTINUED | OUTPATIENT
Start: 2021-07-26 | End: 2021-07-27 | Stop reason: HOSPADM

## 2021-07-26 RX ADMIN — TAMSULOSIN HYDROCHLORIDE 0.4 MG: 0.4 CAPSULE ORAL at 08:28

## 2021-07-26 RX ADMIN — TRAMADOL HYDROCHLORIDE 50 MG: 50 TABLET ORAL at 08:27

## 2021-07-26 RX ADMIN — POTASSIUM CHLORIDE AND SODIUM CHLORIDE: 900; 300 INJECTION, SOLUTION INTRAVENOUS at 12:04

## 2021-07-26 RX ADMIN — CEFTRIAXONE 1000 MG: 1 INJECTION, POWDER, FOR SOLUTION INTRAMUSCULAR; INTRAVENOUS at 20:26

## 2021-07-26 RX ADMIN — TAMSULOSIN HYDROCHLORIDE 0.4 MG: 0.4 CAPSULE ORAL at 20:26

## 2021-07-26 RX ADMIN — PANTOPRAZOLE SODIUM 40 MG: 40 TABLET, DELAYED RELEASE ORAL at 08:28

## 2021-07-26 RX ADMIN — ACETAMINOPHEN 650 MG: 325 TABLET ORAL at 19:34

## 2021-07-26 RX ADMIN — TRAMADOL HYDROCHLORIDE 50 MG: 50 TABLET ORAL at 16:56

## 2021-07-26 RX ADMIN — MAGNESIUM OXIDE 400 MG: 400 TABLET ORAL at 08:28

## 2021-07-26 RX ADMIN — DOCUSATE SODIUM 100 MG: 100 CAPSULE, LIQUID FILLED ORAL at 08:28

## 2021-07-26 RX ADMIN — Medication 240 MG: at 08:29

## 2021-07-26 RX ADMIN — POLYETHYLENE GLYCOL 3350 17 G: 17 POWDER, FOR SOLUTION ORAL at 08:28

## 2021-07-26 RX ADMIN — TRAMADOL HYDROCHLORIDE 50 MG: 50 TABLET ORAL at 03:35

## 2021-07-26 RX ADMIN — FUROSEMIDE 20 MG: 20 TABLET ORAL at 08:28

## 2021-07-26 ASSESSMENT — PAIN SCALES - GENERAL
PAINLEVEL_OUTOF10: 0
PAINLEVEL_OUTOF10: 5
PAINLEVEL_OUTOF10: 8
PAINLEVEL_OUTOF10: 3
PAINLEVEL_OUTOF10: 6
PAINLEVEL_OUTOF10: 8
PAINLEVEL_OUTOF10: 7

## 2021-07-26 ASSESSMENT — PAIN DESCRIPTION - PROGRESSION
CLINICAL_PROGRESSION: GRADUALLY IMPROVING
CLINICAL_PROGRESSION: GRADUALLY IMPROVING

## 2021-07-26 NOTE — PROGRESS NOTES
Internal Medicine Progress Note    [facility]    Kenny Parnell. Isabelle Yanez., F.A.EARLENEOIqraI. Liliya Ayala D.O., SCOTTIE Escalante D.O. Eva Knight, MSN, APRN, NP-C  Alvin Manzo. Sandra Laboy, MSN, APRN-CNP     Primary Care Physician: Reji Francois MD   Admitting Physician:  Luisa Rogel DO  Admission date and time: 7/24/2021 11:21 PM    Room:  [address] [place] [address]  Admitting diagnosis: UTI (urinary tract infection) [N39.0]    Patient Name: Josesito Little  MRN: [id number]    Date of Service: 7/26/2021     Subjective:  Clemencia Tsai is a 66 y.o. male who was seen and examined today,7/26/2021, at the bedside. Clemencia Tsai is resting far more comfortably during my examination today. He denies any further febrile illness. We had a long discussion regarding the events leading up to the hospitalization including the recent removal of Allen catheter following surgical intervention. Review of System:   Constitutional:   Denies fever or chills, weight loss or gain, fatigue or malaise. HEENT:   Denies ear pain, sore throat, sinus or eye problems. Cardiovascular:   Denies any chest pain, irregular heartbeats, or palpitations. Respiratory:   Denies shortness of breath, coughing, sputum production, hemoptysis, or wheezing. Gastrointestinal:   Denies any significant postoperative abdominal pain in the setting of recent hernia repair. Genitourinary:    Denies any urgency, frequency, hematuria. Voiding  without difficulty. Extremities:   Denies lower extremity swelling, edema or cyanosis. Neurology:    Denies any headache or focal neurological deficits, Denies generalized weakness or memory difficulty. Psch:   Denies being anxious or depressed. Musculoskeletal:    Denies  myalgias, joint complaints or back pain. Integumentary:   Denies any rashes, ulcers, or excoriations. Denies bruising. Hematologic/Lymphatic:  Denies bruising or bleeding.     Physical Exam:  No intake/output data recorded. Intake/Output Summary (Last 24 hours) at 7/26/2021 1016  Last data filed at 7/26/2021 0452  Gross per 24 hour   Intake 3157.08 ml   Output 1500 ml   Net 1657.08 ml   I/O last 3 completed shifts: In: 3337.1 [P.O.:1440; I.V.:1501.3; Blood:395.8]  Out: 1500 [Urine:1500]  Patient Vitals for the past 96 hrs (Last 3 readings):   Weight   07/25/21 0330 190 lb 3.2 oz (86.3 kg)     Vital Signs:   Blood pressure (!) 115/57, pulse 66, temperature 98.7 °F (37.1 °C), temperature source Oral, resp. rate 18, height 6' 1\" (1.854 m), weight 190 lb 3.2 oz (86.3 kg), SpO2 93 %. General appearance:  Alert, responsive, oriented to person, place, and time. Well preserved, alert, no distress. Head:  Normocephalic. No masses, lesions or tenderness. Eyes:  PERRLA. EOMI. Sclera clear. Buccal mucosa moist.  ENT:  Ears normal. Mucosa normal.  Neck:    Supple. Trachea midline. No thyromegaly. No JVD. No bruits. Heart:    Rhythm regular. Rate controlled. No murmurs. Lungs:    Symmetrical. Clear to auscultation bilaterally. No wheezes. No rhonchi. No rales. Abdomen:   Soft. Non-tender. Non-distended. Bowel sounds positive. No organomegaly or masses. No pain on palpation. Extremities:    Peripheral pulses present. No peripheral edema. No ulcers. No cyanosis. No clubbing. Neurologic:    Alert x 3. No focal deficit. Cranial nerves grossly intact. No focal weakness. Psych:   Behavior is normal. Mood appears normal. Speech is not rapid and/or pressured. Musculoskeletal:   Spine ROM normal. Muscular strength intact. Gait not assessed. Integumentary:  No rashes  Skin normal color and texture.   Genitalia/Breast:  Deferred    Medication:  Scheduled Meds:   cefTRIAXone (ROCEPHIN) IV  1,000 mg Intravenous Q24H    polyethylene glycol  17 g Oral Daily    docusate sodium  100 mg Oral BID    tamsulosin  0.4 mg Oral BID    pantoprazole  40 mg Oral Daily    magnesium oxide  400 mg Oral Daily    furosemide  20 mg Oral Daily    [Held by provider] hydroCHLOROthiazide  25 mg Oral Daily    ferrous gluconate  240 mg Oral Daily with breakfast     Continuous Infusions:   0.9% NaCl with KCl 40 mEq      sodium chloride         Objective Data:  CBC with Differential:    Lab Results   Component Value Date    WBC 4.7 07/26/2021    RBC 2.32 07/26/2021    HGB 7.2 07/26/2021    HCT 22.4 07/26/2021    PLT 73 07/26/2021    MCV 96.6 07/26/2021    MCH 31.0 07/26/2021    MCHC 32.1 07/26/2021    RDW 16.0 07/26/2021    NRBC 1.8 03/29/2021    BLASTSPCT 0.9 08/12/2019    METASPCT 0.9 01/04/2021    LYMPHOPCT 3.5 07/26/2021    MONOPCT 1.7 07/26/2021    MYELOPCT 0.9 05/10/2021    BASOPCT 0.2 07/26/2021    MONOSABS 0.09 07/26/2021    LYMPHSABS 0.19 07/26/2021    EOSABS 0.12 07/26/2021    BASOSABS 0.00 07/26/2021     BMP:    Lab Results   Component Value Date     07/26/2021    K 3.8 07/26/2021    K 4.1 07/24/2021     07/26/2021    CO2 20 07/26/2021    BUN 26 07/26/2021    LABALBU 2.6 07/26/2021    CREATININE 1.1 07/26/2021    CALCIUM 7.2 07/26/2021    GFRAA >60 07/26/2021    LABGLOM >60 07/26/2021    GLUCOSE 88 07/26/2021       Assessment:  1. Sepsis secondary to urinary tract infection secondary to indwelling Allen catheter following open inguinal hernia repair 7/13/2021 with removal of the Allen catheter 7/23/2020  2. Chemotherapy-induced anemia  3. 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 BPH    Plan:   Tawanna Conrad is maintained on antibiotic therapy as we await final urine and blood culture results. He is no longer febrile. IV fluid resuscitation is being employed and this is also causing a dilutional effect of the patient's known anemia. We will continue to monitor this accordingly and transfuse as necessary. I have encouraged him to become increasingly ambulatory today.   We will maintain IV antibiotic therapy until cultures finalized with plans to transition to oral

## 2021-07-26 NOTE — H&P
[address]                              HISTORY AND PHYSICAL    PATIENT NAME: Charlotte Womack                     :        1950  MED REC NO:   [id number]                            ROOM:       [id number]  ACCOUNT NO:   [de-identified]                           ADMIT DATE: 2021  PROVIDER:     Mayito Morris DO    CHIEF COMPLAINT AND HISTORY OF CHIEF COMPLAINT:  This is a pleasant  78-year-old white male who was admitted to [address] [place] [address]. The  patient presented to the hospital here through the emergency room on  2021. Apparently the patient presented here at which time he was  not feeling well. The patient states approximately 2 weeks ago, he did  have a right inguinal hernia with mesh performed on 2021 by Dr. Loraine Haro. Allen catheter was left in place and this apparently was  discontinued on , when Dr. Loraine Haro saw him. At that time, he did  not keep his legs elevated due to some back pain. He did present here  with the wound healing well. The Allen catheter was removed that day  without problems. Apparently, the patient had an uneventful day on the  . He apparently went home on yesterday on the , approximately  09:00 p.m. began to have symptoms of fever and chills. The patient at  that time presented to the hospital here by EMS, at which time, his  temperature was elevated at 106. The patient was given Tylenol en route  by EMS. On arrival here, the patient did have discomfort and swelling  of the right surgical area with tenderness. At that time, he was  complaining of some fever and chills but was improved. The patient at  that time did have blood culture obtained, started on antibiotics and  was admitted under the service of Dr. Coral Rao and Dr. Matthew Lanier. From a  cardiac standpoint view, the patient currently denies any chest pain per  se.   He denies any resting exertional dyspnea, orthopnea, paroxysmal  nocturnal dyspnea, intermittent claudication or pretibial edema. Respiratory wise, he has no cough. He denies any shortness of breath. Gastrointestinal wise, the patient related to some mild constipation. His appetite has been fair. Otherwise, he has no other complaints.:   Genitourinary wise, the patient has been voiding frequently since his  Allen catheter has been removed, and his urinalysis did show evidence of  urinary tract infection. Also, noted at this time that he is slightly  anemic. He does have a history of enlarged prostate, had seen Dr. Catracho Farris  in the past for this. Otherwise, he was admitted to the hospital to the  general medical floor with a diagnosis of possible sepsis. Currently,  the patient is alert and responsive and has improved since admission. ALLERGIES:  He has allergies to NEOMYCIN and TAPE. MEDICATIONS:  Prior to admission include the following:  He had been on  Flomax 0.4 mg b.i.d., Zofran p.r.n., Lasix 20 daily, mag oxide 240  daily, potassium 20 daily, Claritin 10 daily, Protonix 40 daily, Lomotil  p.r.n., GlycoLax 17 gm daily, HydroDIURIL 25 daily, Tylenol p.r.n.,  Ultram 50 daily, Chronulac 10 mL daily. PAST MEDICAL HISTORY:  Positive for usual childhood diseases. He does  have a history of arthritis, recurrent history of colon cancer with  liver metastasis, currently undergoing chemotherapy, history of  depression, history of blood transfusion, hyperlipidemia, hypertension  and retention of urine. PAST SURGICAL HISTORY:  Includes laparoscopic cholecystectomy in  10/2020, colonoscopic exam , fracture surgery, hernia repair  2021, knee arthroplasty on the right, liver biopsy in 2015,  skin graft, MediPort catheter on the left. FAMILY HISTORY:  Parents are . SOCIAL HISTORY:  The patient is a former smoker, quit many years ago. Denies any use of alcohol or recreational drugs. Currently . He  does have a fiancee. Father of one child. REVIEW OF THE CHART:  At the present times, cholesterol was 97. Hemoglobin has dropped to 6.9. Urinalysis packed wbc's. CT of the  abdomen does show a large right hydrocele and a left hydrocele. Postop  changes. PHYSICAL EXAMINATION:  VITAL SIGNS:  Show height to be 6 feet 1 inch, weight is 190, BMI 25.09. Blood pressure 111/56, pulse 60, respirations 18. GENERAL APPEARANCE:  Revealed a pleasant 71-year-old white male who is  alert, responsive, oriented to person, place and time. HEENT:  Normal grossly to visual inspection. NECK:  Revealed adequate carotid upstrokes without associated bruits. Trachea midline. Thyroid not palpable. LUNGS:  Clear. HEART:  Fairly regular. Grade 1/6 murmur. No S3. No S4.  ABDOMEN:  Soft. No guarding, rebound, or rigidity. Abnormality was  noted in the right lower quadrant area. EXTREMITIES:  Currently without any cyanosis, clubbing or edema. BREASTS:  Not performed. RECTAL AND GENITAL:  Not performed    IMPRESSION:  At this time, include:  1. Sepsis, probably secondary to urinary tract infection. 2.  History of recent right inguinal herniorrhaphy with associated right  and left hydrocele. 3.  History of metastatic rectosigmoid cancer to the liver and lung,  currently on chemotherapy with indwelling Mediport. 4.  Benign prostatic hypertrophy. 5.  Anemia of chronic disease complicated by chemotherapy. PLAN AND RECOMMENDATIONS:  At this time, currently the patient does  appear stable. He has been admitted to the hospital at this time to the  general telemetry floor. We will follow along with Dr. Olamide Heard, who  has seen and made recommendations at this time. Currently it does  appear that the patient does have urosepsis. The patient has been  having panculture. We started antibiotics. Pending results of further  treatment, we will dictate further care and treatment.   The patient will  be transfused because of the anemia, possible urological evaluation. Pending results of further tests, we will dictate further care and  treatment.         Madison Florian DO    D: 07/25/2021 16:33:30       T: 07/25/2021 16:38:44     [email or web address]  Job#: [id number]     Doc#: [id number]

## 2021-07-26 NOTE — PROGRESS NOTES
Physical Therapy Initial Evaluation/Plan of Care    Room #:  0415/0415-02  Patient Name: Irwin Denver  YOB: 1950  MRN: [id number]    Date of Service: 7/26/2021     Tentative placement recommendation: Home vs HH PT  Equipment recommendation: None      Evaluating Physical Therapist: Shawna Robert, PT, DPT     Specific Provider Orders/Date/Referring Provider :   07/26/21 1030   PT eval and treat Start: 07/26/21 1030, End: 07/26/21 1030, ONE TIME, Standing Count: 1 Occurrences, R    Cathryn Hernandez DO Acknowledge New    Admitting Diagnosis:   UTI (urinary tract infection) [N39.0]     Visit Diagnoses       Codes    Fever, unspecified fever cause     R50.9        Surgery:     Patient Active Problem List   Diagnosis    [facility]al [facility]as[facility] to [facility] [address]    Calculus of gallbladder with acute on chronic cholecystitis without obstruction    Hypocalcemia    Bone metastasis ([facility] [address]    Thoracic disc herniation    Chemotherapy-induced neutropenia ([facility] [address]    Encounter for antineoplastic chemotherapy     Acute GI bleeding    Acute cholecystitis    Right inguinal hernia    UTI (urinary tract infection)       ASSESSMENT of Current Deficits Patient exhibits decreased strength, balance and endurance impairing functional mobility, transfers, gait , gait distance and tolerance to activity        PHYSICAL THERAPY  PLAN OF CARE       Physical therapy plan of care is established based on physician order,  patient diagnosis and clinical assessment    Current Treatment Recommendations:    -Bed Mobility: Lower extremity exercises  and Trunk control activities   -Sitting Balance: Incorporate reaching activities to activate trunk muscles , Hands on support to maintain midline , Facilitate active trunk muscle engagement , Facilitate postural control in all planes  and Engage in core activities to allow for movement within base of support   -Standing Balance: Perform strengthening exercises in standing to promote motor control with or without upper extremity support , Instruct patient on adequate base of support to maintain balance and Challenge balance utilizing reaching  activities beyond center of gravity    -Transfers: Provide instruction on proper hand and foot position for adequate transfer of weight onto lower extremities and use of gait device, Cues for hand placement, technique and safety, Facilitate weight shift forward on to lower extremities and provide necessary stabilization of bilateral lower extremities , Support transfer of weight on to lower extremities, Assist with extension of knees trunk and hip to accept weight transfer  and Provide stabilization to prevent fall   -Gait: Gait training, Standing activities to improve: base of support, weight shift, weight bearing , Exercises to improve trunk control, Exercises to improve hip and knee control, Performance of protected weight bearing activities and Activities to increase weight bearing   -Endurance: Utilize Supervised activities to increase level of endurance to allow for safe functional mobility including transfers and gait  and Use graduated activities to promote good breathing techniques and provide support and education to maximize respiratory function  -Stairs: Stair training with instruction on proper technique and hand placement on rail    PT long term treatment goals are located in below grid    Patient and or family understand(s) diagnosis, prognosis, and plan of care. Frequency of treatments: Patient will be seen  3-5 times/week.          Prior Level of Function: Patient ambulated independently   Rehab Potential: good  for baseline    Past medical history:   Past Medical History:   Diagnosis Date    Arthritis     Cancer ([facility] [address]     colon    Depression     History of blood transfusion     [place]    Hyperlipidemia     Hypertension     Prolonged emergence from general anesthesia     Retention of urine      Past Surgical History:   Procedure Laterality Date    CHOLECYSTECTOMY, LAPAROSCOPIC N/A 10/27/2020    CHOLECYSTECTOMY LAPAROSCOPIC WITH IOC, ROBOT XI ASSISTED, POSS OPEN performed by Sandra Webb MD at [facility]  11/2/15    bx, rectal mass    FRACTURE SURGERY      HERNIA REPAIR Right 7/13/2021    OPEN RIGHT INGUINAL HERNIA REPAIR WITH MESH (CPT 27071) performed by [facility] MD at [facility] ARTHROSCOPY Right     LIVER BIOPSY  11/12/15    SKIN FULL GRAFT      on finger    TONSILLECTOMY Bilateral     TUNNELED CENTRAL VENOUS CATHETER W/ SUBCUTANEOUS PORT Left        SUBJECTIVE:    Precautions: Up as tolerated, falls and CA currently receiving CHEMO   Social history: Patient lives with significant other  in a ranch home  with 3 steps  to enter with [employer] in shower grab bars    Equipment owned: [employer] and  [employer],      [employer]   18/24    AM-PAC Mobility Inpatient   How much difficulty turning over in bed?: A Little  How much difficulty sitting down on / standing up from a chair with arms?: A Little  How much difficulty moving from lying on back to sitting on side of bed?: A Little  How much help from another person moving to and from a bed to a chair?: A Little  How much help from another person needed to walk in hospital room?: A Little  How much help from another person for climbing 3-5 steps with a railing?: A Little  AM-PAC Inpatient Mobility Raw Score : 18  AM-PAC Inpatient T-Scale Score : 43.63  Mobility Inpatient CMS 0-100% Score: 46.58  Mobility Inpatient CMS G-Code Modifier : CK    Nursing cleared patient for PT evaluation. The admitting diagnosis and active problem list as listed above have been reviewed prior to the initiation of this evaluation. OBJECTIVE;   Initial Evaluation  Date: 7/26/2021 Treatment Date:     Short Term/ Long Term   Goals   Was pt agreeable to Eval/treatment?  Yes   To be met in 2 days   Pain level   7/10  Low back, nursing aware     Bed Mobility    Rolling: Supervision    Supine to sit: Supervision    Sit to supine: Supervision    Scooting: Supervision    Rolling: Independent   Supine to sit: Independent   Sit to supine: Independent   Scooting: Independent    Transfers Sit to stand: Supervision    Sit to stand: Independent    Ambulation    200 feet using  no device with Supervision    for balance and safety   > 250 feet using  no device with Independent    Stair negotiation: ascended and descended   10 steps with 1 HR with Supervision      10 steps with 1 HR with IND   ROM Within functional limits    Increase range of motion 10% of affected joints    Strength BUE:  refer to OT eval  RLE:  4/5  LLE:  4/5  Increase strength in affected mm groups by 1/3 grade   Balance Sitting EOB:  fair +  Dynamic Standing:  fair +  Sitting EOB:  good   Dynamic Standing: good      Patient is Alert & Oriented x person, place, time and situation and follows directions   Sensation:  Patient  denies numbness/tingling   Edema:  none noted   Endurance: good -    Vitals: room air   Blood Pressure at rest  Blood Pressure during session    Heart Rate at rest  Heart Rate during session    SPO2 at rest %  SPO2 during session %     Patient education  Patient educated on role of Physical Therapy, risks of immobility, safety and plan of care, energy conservation,  importance of mobility while in hospital  and safety      Patient response to education:   Pt verbalized understanding Pt demonstrated skill Pt requires further education in this area   Yes Partial Yes      Treatment:  Patient practiced and was instructed/facilitated in the following treatment: Patient  Sat edge of bed 10 minutes with Supervision  to increase dynamic sitting balance and activity tolerance. Pt able to perform all function at Supervision level including stairs with occasional VC for safety and balance.  Pt did not have any LOB, dizziness, and SOB throughout session and ambulated with decreased davis and stride length. Therapeutic Exercises:  not performed      At end of session, patient in bed with call light and phone within reach, all lines and tubes intact, nursing notified. Patient would benefit from continued skilled Physical Therapy to improve functional independence and quality of life. Patient's/ family goals   home      Time in  1205  Time out  1235    Total Treatment Time  10 minutes    Evaluation time includes thorough review of current medical information, gathering information on past medical history/social history and prior level of function, completion of standardized testing/informal observation of tasks, assessment of data, and development of Plan of care and goals.      CPT codes:  Low Complexity PT evaluation (72515)  Therapeutic activities (83586)   10 minutes  1 unit(s)    Marcin Bond, PT

## 2021-07-26 NOTE — PROGRESS NOTES
Hematology/oncology inpatient follow-up:      Reason for Visit:   Febrile and anemia in known rectal cancer patient     Referring Physician:  Dr Lev Tilley   PCP:  Scott Myles MD    Subjective: The patient is feeling better, T-max overnight 99.3. No bleeding. Physical Exam:  BP (!) 115/57   Pulse 66   Temp 98.7 °F (37.1 °C) (Oral)   Resp 18   Ht 6' 1\" (1.854 m)   Wt 190 lb 3.2 oz (86.3 kg)   SpO2 93%   BMI 25.09 kg/m²   Physical Exam   General: Alert and oriented, no acute distress, eating lunch and tolerating  Diet  HEENT: normocephalic, PEERL, EOMI, oropharynx intact, neck supple without lymphadenopathy  Respiratory: Clear and equal, no accessory muscle use on RA  Cardiac; normal rate and rhythm, pulses intact, without swelling  Abdominal: soft non distended, bruising, active BS  Extremities: NO swelling clubbing or cyanosis   Skin; no rash or lesion  Neuro: Oriented x3    ECOG PS 1    Impression/Plan:  66year old male with known metastatic rectal cancer diagnosed on colonoscopy in 2015 , Invasive adenocarcinoma involving villous adenoma and extending to the cauterized edge of excision. KRAS Mutation: Mutation detected. BRAF Mutation: Mutation not detected. 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 CEA 3488. Bone scan on 11/10/2015 noted no metastatic disease. CT chest on 11/10/2015 revealed Multiple pulmonary nodules in the upper and lower lobes consistent with metastatic disease;   Hepatic metastasis also visualized. For his advanced rectosigmoid cancer, systemic chemotherapy was recommended; FOLFOX + Avastin and started on 11/23/2015. He as changed to FOLFIRI on 08/02/2016 d/t metastasis to the bone. HE received cycle 110 of FOLFIRI on 06/08/2021, Avastin has been held d/t known surgery-last dose was 05/11/2021.     HE had right inguinal/scrotol hernia repair on 07/13/2021 with Dr Nkechi Ordonez with folely removal on Friday 07/23/2021  He developed fever per patient up to 106 over night on 07/24/2021 and was brought to [address] [place], [place] [address] ER via EMS-he received tylenol via EMS for fever of 106. Reports chills and fever-like feelings but denies SOB, chest pain, nausea, vomiting, dysuria or abdominal pain. Reports some swelling to surgical site, no redness or drainage. Upon arrival   UA with large leuk esterase and moderate bacteria and started on Rocphin. Labs significant for hgb of 7.2 and then 6.9 on AM labs-1 unit pRBC ordered. Plts of 85. No leukocytosis   Blood cultures sent and pending     Patient has chronic anemia d/t malignancy,chemo, chronic inflammation and recent surgery,UTI    -Continue abx per primary team-patient is on ceftriaxone, urine culture positive for GNR, follow blood cultures. -General surgery consulted-no intervention  -Urology team is on board, no interventions planned at this time. -Monitor CBCD, he received packed RBC transfusion yesterday for hemoglobin 6. 9G/DL, today hemoglobin/hematocrit 7.2/22.4, iron studies pending, no vitamin B12/folate deficiency  -Continue to hold chemo, will follow up outpatient   -We will continue to follow    Thank you for allowing us to participate in the care of Clara Sampson MD   HEMATOLOGY/MEDICAL [facility] [address] [address]  Dept: Elías Lanier 149: [phone]

## 2021-07-26 NOTE — PROGRESS NOTES
GENERAL SURGERY  DAILY PROGRESS NOTE    Date:2021       Room:[address] [place] [address]  Patient Name:Hesham Blanton     YOB: 1950     Age:71 y.o. Subjective:  No further fevers. No n/v or dysuria. Urinating fine. R inguinal region healing well. Objective:  BP (!) 115/57   Pulse 66   Temp 98.7 °F (37.1 °C) (Oral)   Resp 18   Ht 6' 1\" (1.854 m)   Wt 190 lb 3.2 oz (86.3 kg)   SpO2 93%   BMI 25.09 kg/m²   Temp (24hrs), Av.9 °F (36.6 °C), Min:97.1 °F (36.2 °C), Max:99.3 °F (37.4 °C)      I/O (24Hr):  I/O last 3 completed shifts: In: 3337.1 [P.O.:1440; I.V.:1501.3; Blood:395.8]  Out: 1500 [Urine:1500]     GENERAL:  No acute distress. Alert and interactive. LUNGS:  No cough. Nonlabored breathing on room air. CARDIOVASC:  Normal rate, no cyanosis. ABDOMEN:  Soft, non-distended, non-tender. R inguinal scar palpable but not really tender. Incision intact/clean, no sign of infection/erythema. Old bruises around inguinal region healing. Some scrotal edema nontender. No guarding / rigidity / rebound. EXTREMITIES:  No UE edema, no deformities. Assessment:  66 y.o. male with UTI secondary to urinary obstruction, resolved with x1 ceftriaxone in ED. Postop 2 weeks from right open inguinal hernia repair with mesh 21, recovering adequately from that perspective.     Plan:  - ok to discharge from general surgery perspective, but patient is awaiting urology consult    Electronically signed by MD Adriana Peters MD on 2021 at 9:47 AM

## 2021-07-26 NOTE — CONSULTS
7/26/2021 8:30 AM  Service: Urology  Group: [facility] ([facility])    Yesenia Sharif  [id number]     Chief Complaint:    Urinary frequency    History of Present Illness: The patient is a 66 y.o. male patient who has a history of  metastatic rectal cancer  presented to the hospital 2 days ago with fever and chills. Approximately 2 weeks ago he underwent a right inguinal hernia repair with mesh with Dr. Kane Martines. He was discharged with the álvarez at that time due to urinary retention. Thi swas removed on 7/23/21. We were asked to evaluate him for urinary frequency. A PVR performed yesterday was 0ml. He is currently voiding comfortably. He is known to our practice and is a patient of Dr. Sandra Barrios. He has a history of BPH and takes Flomax.     Past Medical History:   Diagnosis Date    Arthritis     Cancer ([address]     colon    Depression     History of blood transfusion     [facility]    Hyperlipidemia     Hypertension     Prolonged emergence from general anesthesia     Retention of urine          Past Surgical History:   Procedure Laterality Date    CHOLECYSTECTOMY, LAPAROSCOPIC N/A 10/27/2020    CHOLECYSTECTOMY LAPAROSCOPIC WITH IOC, ROBOT XI ASSISTED, POSS OPEN performed by Carolina Matthews MD at [facility]  11/2/15    bx, rectal mass    FRACTURE SURGERY      HERNIA REPAIR Right 7/13/2021    OPEN RIGHT INGUINAL HERNIA REPAIR WITH MESH (CPT 93064) performed by Carolina Matthews MD at [facility] ARTHROSCOPY Right     LIVER BIOPSY  11/12/15    SKIN FULL GRAFT      on finger    TONSILLECTOMY Bilateral     TUNNELED CENTRAL VENOUS CATHETER W/ SUBCUTANEOUS PORT Left        Medications Prior to Admission:    Medications Prior to Admission: hydrocortisone 2.5 % cream, APPLY TO AFFECTED AREA, NOT TO FACE OR GENITAL AREAS, NOT TO EXCEED 4 WKS OF USE  Magic Mouthwash (MIRACLE MOUTHWASH), Swish and swallow 15 mLs 4 times daily as needed for Irritation Equal Amts of Benadryl,Maalox, and Xylocaine  ferrous gluconate (FERGON) 324 (38 Fe) MG tablet, Take 324 mg by mouth daily (with breakfast)  tamsulosin (FLOMAX) 0.4 MG capsule, Take 2 capsules by mouth daily (Patient taking differently: Take 0.4 mg by mouth 2 times daily )  ondansetron (ZOFRAN) 4 MG tablet, Take 1 tablet by mouth every 8 hours as needed for Nausea or Vomiting  furosemide (LASIX) 20 MG tablet, Take 20 mg by mouth daily Indications: pt is taken this med 7 days a week for now   magnesium oxide (MAG-OX) 400 (240 Mg) MG tablet, Take 1 tablet by mouth 2 times daily (Patient taking differently: Take 400 mg by mouth daily )  potassium chloride (KLOR-CON M) 20 MEQ extended release tablet, Take 40 mEq by mouth daily   loratadine (CLARITIN) 10 MG tablet, Take 10 mg by mouth See Admin Instructions Takes the week of chemotherapy (Last dose: 5/9; Next dose: 5/26)  pantoprazole (PROTONIX) 40 MG tablet, Take 40 mg by mouth daily  b complex vitamins capsule, Take 1 capsule by mouth daily  diphenoxylate-atropine (LOMOTIL) 2.5-0.025 MG per tablet, Take 1 tablet by mouth 4 times daily as needed for Diarrhea.  polyethylene glycol (GLYCOLAX) packet, Take 17 g by mouth daily as needed for Constipation  hydrochlorothiazide (HYDRODIURIL) 25 MG tablet, Take 1 tablet by mouth daily  hydrocortisone (ANUSOL-HC) 2.5 % rectal cream, Use 3-4 times daily. Do not use internally. External use only. acetaminophen (TYLENOL) 500 MG tablet, Take 500 mg by mouth every 6 hours as needed for Pain  traMADol (ULTRAM) 50 MG tablet, TAKE 1 TABLET BY MOUTH EVERY 4 TO 6 HOURS AS NEEDED FOR PAIN  lactulose (CHRONULAC) 10 GM/15ML solution, Take 20 g by mouth as needed     Allergies:    Neosporin [neomycin-polymyxin-gramicidin] and Tape [adhesive tape]    Social History:    reports that he quit smoking about 20 years ago. His smoking use included cigarettes. He has a 40.00 pack-year smoking history.  He has never used smokeless tobacco. He reports previous alcohol use. He reports that he does not use drugs. Family History:   Non-contributory to this Urological problem  family history includes Cancer in his father and mother; Diabetes in his brother. Review of Systems:  Constitutional: No fever or chills currently  Respiratory: negative for cough and hemoptysis  Cardiovascular: negative for chest pain and dyspnea  Gastrointestinal: negative for abdominal pain, diarrhea, nausea and vomiting   Derm: negative for rash and skin lesion(s)  Neurological: negative for seizures and tremors  Musculoskeletal: Negative    Psychiatric: Negative   : As above in the HPI, otherwise negative  All other reviews are negative    Physical Exam:     Vitals:  BP (!) 115/57   Pulse 66   Temp 98.7 °F (37.1 °C) (Oral)   Resp 18   Ht 6' 1\" (1.854 m)   Wt 190 lb 3.2 oz (86.3 kg)   SpO2 93%   BMI 25.09 kg/m²     General:  Awake, alert, oriented X 3. No apparent distress. HEENT:  Normocephalic, atraumatic. Lungs:  Respirations symmetric and non-labored. Abdomen:  soft, nontender, no masses  Extremities:  No clubbing, cyanosis, or edema  Skin:  Warm and dry, no open lesions or rashes  Neuro:  There are no motor or sensory deficits in the 4 quadrant extremities   Rectal: deferred  Genitourinary: No Allen, urine in urinal at bedside is yellow,scrotal edema with ecchymosis to the right inguinal area, no crepitus, nontender    Labs:     Recent Labs     07/24/21  2344 07/25/21  0746 07/26/21  0524   WBC 10.2  --  4.7   RBC 2.31*  --  2.32*   HGB 7.2* 6.9* 7.2*   HCT 22.4* 22.9* 22.4*   MCV 97.0  --  96.6   MCH 31.2  --  31.0   MCHC 32.1  --  32.1   RDW 15.8*  --  16.0*   PLT 85*  --  73*   MPV 11.0  --  12.0         Recent Labs     07/24/21  2344 07/26/21  0524   CREATININE 1.2 1.1       No results found for: PSA    Imaging:   Narrative   EXAMINATION:   CT OF THE ABDOMEN AND PELVIS WITH CONTRAST 7/25/2021 12:25 am       TECHNIQUE:   CT of the abdomen and pelvis was performed with the administration of   intravenous contrast. Multiplanar reformatted images are provided for review. Dose modulation, iterative reconstruction, and/or weight based adjustment of   the mA/kV was utilized to reduce the radiation dose to as low as reasonably   achievable.       COMPARISON:   04/20/2021       HISTORY:   ORDERING SYSTEM PROVIDED HISTORY: Pain, Recent hernia repair, +fever   TECHNOLOGIST PROVIDED HISTORY:   Additional Contrast?->None   Reason for exam:->Pain, Recent hernia repair, +fever   Decision Support Exception - unselect if not a suspected or confirmed   emergency medical condition->Emergency Medical Condition (MA)       History of rectal cancer.       FINDINGS:   Some images are degraded by streak artifact due to positioning of the   patient's arms and mild patient motion.  Within the central aspect of the   liver, is a 1.9 cm heterogeneous lesion which previously measured 1.9 cm and   may represent stable metastatic disease.  Splenomegaly measuring 16.4 cm in   AP diameter, previously measuring 16 cm.  No obvious acute pancreatic   abnormality.  No adrenal mass.  No hydronephrosis.  Tiny low-attenuation   focus in the posterior upper right kidney likely represents a cyst and is   stable.       Assessment of bowel is limited without oral contrast.  No bowel obstruction   identified.  Moderate stool burden.  Appendix is probably normal.  Moderate   stool in portions of the colon.  There is some mild thickening near the   anorectal junction, possibly due to underdistention but should be correlated   clinically.  Mild diverticulosis without evidence of acute diverticulitis.    Minimal urinary bladder wall thickening.  Prostate gland is borderline   prominent and contains some coarsened calcifications.  Moderate-sized fat   containing left inguinal hernia.  Small left hydrocele.  Large right   hydrocele.  Postoperative changes of right inguinal hernia repair.  However,   in the right inguinal region, there is a heterogeneous area of localized soft   tissue thickening and a tiny focus of gas.  The heterogeneous soft tissue   thickening measures 4.4 x 4 cm.  Within the subcutaneous fat of the lower   right anterior pelvic wall, there is also a 2nd area of localized thickening   measuring 5.5 x 2.5 cm favoring a hematoma.       Scattered areas of scarring and/or atelectasis in the lung bases.    Degenerative changes are present in the spine and pelvis.           Impression   Focal area of soft tissue thickening in the subcutaneous aspect of the lower   anterior right abdominal wall which may represent focal hematoma.       Large right hydrocele and small left hydrocele.       Along the expected location of the spermatic cord in the right inguinal   region is a heterogeneous area of soft tissue thickening with tiny focus of   gas.  Given the history, these could be postoperative and related to an area   of hematoma.  Phlegmon or developing infectious process not entirely   excluded.  Follow-up would be useful.       Fat containing left inguinal hernia.       Urinary bladder thickening.  Correlate for cystitis.       Splenomegaly.       Stable hepatic lesion which may represent metastatic disease.  Continued   follow-up recommended.                 Assessment/plan:  BPH  UTI    PVR yesterday was 0 mL  He is voiding comfortably without difficulty at this time  Maintain without Allen catheter  Urine culture evaluating for gram-negative rods, identification and sensitivities to follow  Antibiotics per primary, currently on Rocephin  Continue the Flomax  No further acute  interventions planned at this time  Please call with additional questions or concerns              Electronically signed by RUBEN Mcmillan CNP on 7/26/2021 at 8:30 AM   Agree with above assessment and plan  [email or web address]

## 2021-07-27 VITALS
WEIGHT: 190.2 LBS | HEIGHT: 73 IN | RESPIRATION RATE: 18 BRPM | BODY MASS INDEX: 25.21 KG/M2 | HEART RATE: 70 BPM | SYSTOLIC BLOOD PRESSURE: 131 MMHG | DIASTOLIC BLOOD PRESSURE: 67 MMHG | TEMPERATURE: 98.6 F | OXYGEN SATURATION: 96 %

## 2021-07-27 LAB
ANION GAP SERPL CALCULATED.3IONS-SCNC: 9 MMOL/L (ref 7–16)
BASOPHILS ABSOLUTE: 0.04 E9/L (ref 0–0.2)
BASOPHILS RELATIVE PERCENT: 0.9 % (ref 0–2)
BUN BLDV-MCNC: 20 MG/DL (ref 6–23)
BURR CELLS: ABNORMAL
CALCIUM SERPL-MCNC: 7.4 MG/DL (ref 8.6–10.2)
CHLORIDE BLD-SCNC: 104 MMOL/L (ref 98–107)
CO2: 21 MMOL/L (ref 22–29)
CREAT SERPL-MCNC: 1 MG/DL (ref 0.7–1.2)
EOSINOPHILS ABSOLUTE: 0.04 E9/L (ref 0.05–0.5)
EOSINOPHILS RELATIVE PERCENT: 0.9 % (ref 0–6)
GFR AFRICAN AMERICAN: >60
GFR NON-AFRICAN AMERICAN: >60 ML/MIN/1.73
GLUCOSE BLD-MCNC: 89 MG/DL (ref 74–99)
HCT VFR BLD CALC: 28.2 % (ref 37–54)
HEMOGLOBIN: 8.5 G/DL (ref 12.5–16.5)
LYMPHOCYTES ABSOLUTE: 0.25 E9/L (ref 1.5–4)
LYMPHOCYTES RELATIVE PERCENT: 6.1 % (ref 20–42)
MAGNESIUM: 1.7 MG/DL (ref 1.6–2.6)
MCH RBC QN AUTO: 30 PG (ref 26–35)
MCHC RBC AUTO-ENTMCNC: 30.1 % (ref 32–34.5)
MCV RBC AUTO: 99.6 FL (ref 80–99.9)
MONOCYTES ABSOLUTE: 0.12 E9/L (ref 0.1–0.95)
MONOCYTES RELATIVE PERCENT: 2.6 % (ref 2–12)
NEUTROPHILS ABSOLUTE: 3.69 E9/L (ref 1.8–7.3)
NEUTROPHILS RELATIVE PERCENT: 89.6 % (ref 43–80)
ORGANISM: ABNORMAL
OVALOCYTES: ABNORMAL
PDW BLD-RTO: 15.8 FL (ref 11.5–15)
PHOSPHORUS: 2.6 MG/DL (ref 2.5–4.5)
PLATELET # BLD: 99 E9/L (ref 130–450)
PLATELET CONFIRMATION: NORMAL
PMV BLD AUTO: 11.2 FL (ref 7–12)
POIKILOCYTES: ABNORMAL
POLYCHROMASIA: ABNORMAL
POTASSIUM SERPL-SCNC: 3.7 MMOL/L (ref 3.5–5)
RBC # BLD: 2.83 E12/L (ref 3.8–5.8)
SODIUM BLD-SCNC: 134 MMOL/L (ref 132–146)
TEAR DROP CELLS: ABNORMAL
URINE CULTURE, ROUTINE: ABNORMAL
WBC # BLD: 4.1 E9/L (ref 4.5–11.5)

## 2021-07-27 PROCEDURE — 36415 COLL VENOUS BLD VENIPUNCTURE: CPT

## 2021-07-27 PROCEDURE — 84100 ASSAY OF PHOSPHORUS: CPT

## 2021-07-27 PROCEDURE — 85025 COMPLETE CBC W/AUTO DIFF WBC: CPT

## 2021-07-27 PROCEDURE — 6370000000 HC RX 637 (ALT 250 FOR IP): Performed by: INTERNAL MEDICINE

## 2021-07-27 PROCEDURE — 83735 ASSAY OF MAGNESIUM: CPT

## 2021-07-27 PROCEDURE — 80048 BASIC METABOLIC PNL TOTAL CA: CPT

## 2021-07-27 RX ORDER — CEFDINIR 300 MG/1
300 CAPSULE ORAL 2 TIMES DAILY
Qty: 14 CAPSULE | Refills: 0 | Status: SHIPPED | OUTPATIENT
Start: 2021-07-27 | End: 2021-08-03

## 2021-07-27 RX ADMIN — Medication 240 MG: at 09:01

## 2021-07-27 RX ADMIN — POLYETHYLENE GLYCOL 3350 17 G: 17 POWDER, FOR SOLUTION ORAL at 09:01

## 2021-07-27 RX ADMIN — FUROSEMIDE 20 MG: 20 TABLET ORAL at 09:01

## 2021-07-27 RX ADMIN — TRAMADOL HYDROCHLORIDE 50 MG: 50 TABLET ORAL at 00:53

## 2021-07-27 RX ADMIN — PANTOPRAZOLE SODIUM 40 MG: 40 TABLET, DELAYED RELEASE ORAL at 09:01

## 2021-07-27 RX ADMIN — TAMSULOSIN HYDROCHLORIDE 0.4 MG: 0.4 CAPSULE ORAL at 09:01

## 2021-07-27 RX ADMIN — MAGNESIUM OXIDE 400 MG: 400 TABLET ORAL at 09:01

## 2021-07-27 ASSESSMENT — PAIN SCALES - GENERAL: PAINLEVEL_OUTOF10: 7

## 2021-07-27 NOTE — PROGRESS NOTES
Physical Therapy    [id number]    Patient unavailable for physical therapy treatment due to patient stated \" I am going home today\". Pretty Castrejon  [facility]  LIC # [id number]

## 2021-07-27 NOTE — DISCHARGE SUMMARY
Internal Medicine Progress Note     [facility]     Tiffani Garcia, MARTITAOLAURITA Bauman D.O., LESLY.EARLENEOLAURITA Post D.O. Kate Celestin, MSN, APRN, NP-C  Toby Cage. Cornelia Loomis, MSN, [id number] [facility]       Internal Medicine  Discharge Summary    NAME: Mallorie Reynolds  :  1950  MRN:  [id number]  Mya Flood MD  ADMITTED: 2021      DISCHARGED: 21    ADMITTING PHYSICIAN: Tiffani Schultz DO    CONSULTANT(S):   IP CONSULT TO GENERAL SURGERY  IP CONSULT TO UROLOGY  IP CONSULT TO ONCOLOGY     ADMITTING DIAGNOSIS:   UTI (urinary tract infection) [N39.0]     DISCHARGE DIAGNOSES:   1. Sepsis secondary to urinary tract infection secondary to indwelling Allen catheter following open inguinal hernia repair 2021 with removal of the Allen catheter 2020  2. Chemotherapy-induced anemia  3. 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 BPH    BRIEF HISTORY OF PRESENT ILLNESS:   This is a pleasant  67-year-old white male who was admitted to [address] [place] [address]. The  patient presented to the hospital here through the emergency room on  2021. Apparently the patient presented here at which time he was  not feeling well. The patient states approximately 2 weeks ago, he did  have a right inguinal hernia with mesh performed on 2021 by Dr. Sugar Gary. Allen catheter was left in place and this apparently was  discontinued on , when Dr. Sugar Gary saw him. At that time, he did  not keep his legs elevated due to some back pain. He did present here  with the wound healing well. The Allen catheter was removed that day  without problems. Apparently, the patient had an uneventful day on the  . He apparently went home on yesterday on the , approximately  09:00 p.m. began to have symptoms of fever and chills.   The patient at  that time presented to the hospital here by EMS, at which time, his  temperature was elevated at 106. The patient was given Tylenol en route  by EMS. On arrival here, the patient did have discomfort and swelling  of the right surgical area with tenderness. At that time, he was  complaining of some fever and chills but was improved. The patient at  that time did have blood culture obtained, started on antibiotics and  was admitted under the service of Dr. Philip Veras and Dr. Short. From a  cardiac standpoint view, the patient currently denies any chest pain per  se. He denies any resting exertional dyspnea, orthopnea, paroxysmal  nocturnal dyspnea, intermittent claudication or pretibial edema. Respiratory wise, he has no cough. He denies any shortness of breath. Gastrointestinal wise, the patient related to some mild constipation. His appetite has been fair. Otherwise, he has no other complaints.:   Genitourinary wise, the patient has been voiding frequently since his  Allen catheter has been removed, and his urinalysis did show evidence of  urinary tract infection. Also, noted at this time that he is slightly  anemic. He does have a history of enlarged prostate, had seen Dr. Julia Michele  in the past for this. Otherwise, he was admitted to the hospital to the  general medical floor with a diagnosis of possible sepsis. Currently,  the patient is alert and responsive and has improved since admission.     LABS[de-identified]  Lab Results   Component Value Date    WBC 4.1 (L) 07/27/2021    HGB 8.5 (L) 07/27/2021    HCT 28.2 (L) 07/27/2021    PLT 99 (L) 07/27/2021     07/27/2021    K 3.7 07/27/2021     07/27/2021    CREATININE 1.0 07/27/2021    BUN 20 07/27/2021    CO2 21 (L) 07/27/2021    GLUCOSE 89 07/27/2021    ALT 15 07/26/2021    AST 30 07/26/2021    INR 1.1 05/11/2020     Lab Results   Component Value Date    INR 1.1 05/11/2020    INR 1.1 06/14/2016    INR 1.1 11/12/2015    PROTIME 13.1 (H) 05/11/2020    PROTIME 11.7 06/14/2016    PROTIME 12.0 11/12/2015      Lab Results   Component Value Date    TSH 2.660 07/25/2021     Lab Results   Component Value Date    TRIG 36 07/25/2021     Lab Results   Component Value Date    HDL 61 07/25/2021    HDL 77 08/17/2020     Lab Results   Component Value Date    LDLCALC 29 07/25/2021    LDLCALC 19 08/17/2020     Lab Results   Component Value Date    LABA1C 4.4 07/25/2021       IMAGING:  XR CHEST (2 VW)    Result Date: 7/25/2021  EXAMINATION: TWO XRAY VIEWS OF THE CHEST 7/25/2021 12:41 am COMPARISON: CT dated 04/20/2021 HISTORY: ORDERING SYSTEM PROVIDED HISTORY: fever, cough TECHNOLOGIST PROVIDED HISTORY: Reason for exam:->fever, cough FINDINGS: EKG leads overlie the chest.  Left-sided Port-A-Cath remains in place. Heart size is normal.  Scattered vascular calcifications. No pneumothorax. No focal consolidation or effusion. Degenerative changes are present in the spine and shoulders. Similar chronic appearing findings. Short-term follow-up recommended if symptoms persist.     CT ABDOMEN PELVIS W IV CONTRAST Additional Contrast? None    Result Date: 7/25/2021  EXAMINATION: CT OF THE ABDOMEN AND PELVIS WITH CONTRAST 7/25/2021 12:25 am TECHNIQUE: CT of the abdomen and pelvis was performed with the administration of intravenous contrast. Multiplanar reformatted images are provided for review. Dose modulation, iterative reconstruction, and/or weight based adjustment of the mA/kV was utilized to reduce the radiation dose to as low as reasonably achievable. COMPARISON: 04/20/2021 HISTORY: ORDERING SYSTEM PROVIDED HISTORY: Pain, Recent hernia repair, +fever TECHNOLOGIST PROVIDED HISTORY: Additional Contrast?->None Reason for exam:->Pain, Recent hernia repair, +fever Decision Support Exception - unselect if not a suspected or confirmed emergency medical condition->Emergency Medical Condition (MA) History of rectal cancer.  FINDINGS: Some images are degraded by streak artifact due to positioning of the patient's arms and mild patient motion. Within the central aspect of the liver, is a 1.9 cm heterogeneous lesion which previously measured 1.9 cm and may represent stable metastatic disease. Splenomegaly measuring 16.4 cm in AP diameter, previously measuring 16 cm. No obvious acute pancreatic abnormality. No adrenal mass. No hydronephrosis. Tiny low-attenuation focus in the posterior upper right kidney likely represents a cyst and is stable. Assessment of bowel is limited without oral contrast.  No bowel obstruction identified. Moderate stool burden. Appendix is probably normal.  Moderate stool in portions of the colon. There is some mild thickening near the anorectal junction, possibly due to underdistention but should be correlated clinically. Mild diverticulosis without evidence of acute diverticulitis. Minimal urinary bladder wall thickening. Prostate gland is borderline prominent and contains some coarsened calcifications. Moderate-sized fat containing left inguinal hernia. Small left hydrocele. Large right hydrocele. Postoperative changes of right inguinal hernia repair. However, in the right inguinal region, there is a heterogeneous area of localized soft tissue thickening and a tiny focus of gas. The heterogeneous soft tissue thickening measures 4.4 x 4 cm. Within the subcutaneous fat of the lower right anterior pelvic wall, there is also a 2nd area of localized thickening measuring 5.5 x 2.5 cm favoring a hematoma. Scattered areas of scarring and/or atelectasis in the lung bases. Degenerative changes are present in the spine and pelvis. Focal area of soft tissue thickening in the subcutaneous aspect of the lower anterior right abdominal wall which may represent focal hematoma. Large right hydrocele and small left hydrocele. Along the expected location of the spermatic cord in the right inguinal region is a heterogeneous area of soft tissue thickening with tiny focus of gas.   Given the history, these could be postoperative and related to an area of hematoma. Phlegmon or developing infectious process not entirely excluded. Follow-up would be useful. Fat containing left inguinal hernia. Urinary bladder thickening. Correlate for cystitis. Splenomegaly. Stable hepatic lesion which may represent metastatic disease. Continued follow-up recommended. HOSPITAL COURSE:   Savana Hilton did well throughout the hospitalization. He was found to be suffering from a severe urinary tract infection needing sepsis criteria. Urine cultures ultimately returned positive for Klebsiella and he has been transitioned to oral antibiotic therapy. He is no longer febrile. He was evaluated by the surgical team in the setting of recent hernia repair. Will be followed up as an outpatient in this setting. Otherwise, urologic and oncologic recommendations have been reviewed. His hemoglobin will need close monitoring as an outpatient and did respond accordingly to packed red blood cell transfusion. He has returned to his normal state of health and is deemed acceptable for discharge home. BRIEF PHYSICAL EXAMINATION AND LABORATORIES ON DAY OF DISCHARGE:  VITALS:  /67   Pulse 70   Temp 98.6 °F (37 °C) (Oral)   Resp 18   Ht 6' 1\" (1.854 m)   Wt 190 lb 3.2 oz (86.3 kg)   SpO2 96%   BMI 25.09 kg/m²     HEENT:  PERRLA. EOMI. Sclera clear. Buccal mucosa moist.    Neck:  Supple. Trachea midline. No thyromegaly. No JVD. No bruits. Heart:  Rhythm regular, rate controlled. No murmurs. Lungs:  Symmetrical. Clear to auscultation bilaterally. No wheezes. No rhonchi. No rales. Abdomen: Soft. Non-tender. Non-distended. Bowel sounds positive. No organomegaly or masses. No pain on palpation    Extremities:  Peripheral pulses present. No peripheral edema. No ulcers. Neurologic:  Alert x 3. No focal deficit. Cranial nerves grossly intact. Skin:  No petechia. No hemorrhage. No wounds.       DISPOSITION:  The patient's condition is good. At this time the patient is without objective evidence of an acute process requiring continuing hospitalization or inpatient management. They are stable for discharge with outpatient follow-up. I have spoken with the patient and discussed the results of the current hospitalization, in addition to providing specific details for the plan of care and counseling regarding the diagnosis and prognosis. The plan has been discussed in detail and they are aware of the specific conditions for emergent return, as well as the importance of follow-up. Their questions are answered at this time and they are agreeable with the plan for discharge to home    DISCHARGE MEDICATIONS:    Ari Mcdonald   Home Medication Instructions TXJ:[id number]    Printed on:07/27/21 4113   Medication Information                      acetaminophen (TYLENOL) 500 MG tablet  Take 500 mg by mouth every 6 hours as needed for Pain             b complex vitamins capsule  Take 1 capsule by mouth daily             cefdinir (OMNICEF) 300 MG capsule  Take 1 capsule by mouth 2 times daily for 7 days             diphenoxylate-atropine (LOMOTIL) 2.5-0.025 MG per tablet  Take 1 tablet by mouth 4 times daily as needed for Diarrhea. ferrous gluconate (FERGON) 324 (38 Fe) MG tablet  Take 324 mg by mouth daily (with breakfast)             furosemide (LASIX) 20 MG tablet  Take 20 mg by mouth daily Indications: pt is taken this med 7 days a week for now              hydrochlorothiazide (HYDRODIURIL) 25 MG tablet  Take 1 tablet by mouth daily             hydrocortisone (ANUSOL-HC) 2.5 % rectal cream  Use 3-4 times daily. Do not use internally. External use only.              hydrocortisone 2.5 % cream  APPLY TO AFFECTED AREA, NOT TO FACE OR GENITAL AREAS, NOT TO EXCEED 4 WKS OF USE             lactulose (CHRONULAC) 10 GM/15ML solution  Take 20 g by mouth as needed              loratadine (CLARITIN) 10 MG tablet  Take 10 mg by mouth See Admin Instructions Takes the week of chemotherapy (Last dose: 5/9; Next dose: 5/26)             Magic Mouthwash (MIRACLE MOUTHWASH)  Swish and swallow 15 mLs 4 times daily as needed for Irritation Equal Amts of Benadryl,Maalox, and Xylocaine             magnesium oxide (MAG-OX) 400 (240 Mg) MG tablet  Take 1 tablet by mouth 2 times daily             ondansetron (ZOFRAN) 4 MG tablet  Take 1 tablet by mouth every 8 hours as needed for Nausea or Vomiting             pantoprazole (PROTONIX) 40 MG tablet  Take 40 mg by mouth daily             polyethylene glycol (GLYCOLAX) packet  Take 17 g by mouth daily as needed for Constipation             potassium chloride (KLOR-CON M) 20 MEQ extended release tablet  Take 40 mEq by mouth daily              tamsulosin (FLOMAX) 0.4 MG capsule  Take 2 capsules by mouth daily             traMADol (ULTRAM) 50 MG tablet  TAKE 1 TABLET BY MOUTH EVERY 4 TO 6 HOURS AS NEEDED FOR PAIN                 FOLLOW UP/INSTRUCTIONS:  · This patient is instructed to follow-up with his primary care physician. · Patient is instructed to follow-up with the consults listed above as directed by them. · he is instructed to resume home medications and take new medications as indicated in the list above. · If the patient has a recurrence of symptoms, he is instructed to go to the ED. Preparing for this patient's discharge, including paperwork, orders, instructions, and meeting with patient did require > 40 minutes.     Keanu Wilson DO   7/27/2021  8:54 AM

## 2021-07-30 ENCOUNTER — HOSPITAL ENCOUNTER (OUTPATIENT)
Dept: INFUSION THERAPY | Age: 71
Discharge: HOME | End: 2021-07-30
Payer: MEDICARE

## 2021-07-30 ENCOUNTER — TELEPHONE (OUTPATIENT)
Dept: INFUSION THERAPY | Age: 71
End: 2021-07-30

## 2021-07-30 ENCOUNTER — OFFICE VISIT (OUTPATIENT)
Dept: ONCOLOGY | Age: 71
End: 2021-07-30
Payer: MEDICARE

## 2021-07-30 VITALS
BODY MASS INDEX: 25.9 KG/M2 | OXYGEN SATURATION: 97 % | RESPIRATION RATE: 18 BRPM | TEMPERATURE: 98.7 F | HEIGHT: 73 IN | WEIGHT: 195.4 LBS | SYSTOLIC BLOOD PRESSURE: 127 MMHG | HEART RATE: 65 BPM | DIASTOLIC BLOOD PRESSURE: 65 MMHG

## 2021-07-30 DIAGNOSIS — C20 RECTAL CANCER METASTASIZED TO LIVER (HCC): Primary | ICD-10-CM

## 2021-07-30 DIAGNOSIS — C78.7 RECTAL CANCER METASTASIZED TO LIVER (HCC): Primary | ICD-10-CM

## 2021-07-30 DIAGNOSIS — C20 RECTAL CANCER (HCC): Primary | ICD-10-CM

## 2021-07-30 LAB
BLOOD CULTURE, ROUTINE: NORMAL
CULTURE, BLOOD 2: NORMAL

## 2021-07-30 PROCEDURE — 6360000002 HC RX W HCPCS: Performed by: INTERNAL MEDICINE

## 2021-07-30 PROCEDURE — 2580000003 HC RX 258: Performed by: INTERNAL MEDICINE

## 2021-07-30 PROCEDURE — 99214 OFFICE O/P EST MOD 30 MIN: CPT | Performed by: INTERNAL MEDICINE

## 2021-07-30 PROCEDURE — 96523 IRRIG DRUG DELIVERY DEVICE: CPT

## 2021-07-30 RX ORDER — HEPARIN SODIUM (PORCINE) LOCK FLUSH IV SOLN 100 UNIT/ML 100 UNIT/ML
500 SOLUTION INTRAVENOUS PRN
Status: CANCELLED | OUTPATIENT
Start: 2021-07-30

## 2021-07-30 RX ORDER — SODIUM CHLORIDE 0.9 % (FLUSH) 0.9 %
10 SYRINGE (ML) INJECTION PRN
Status: CANCELLED | OUTPATIENT
Start: 2021-07-30

## 2021-07-30 RX ORDER — SODIUM CHLORIDE 0.9 % (FLUSH) 0.9 %
10 SYRINGE (ML) INJECTION PRN
Status: DISCONTINUED | OUTPATIENT
Start: 2021-07-30 | End: 2021-07-31 | Stop reason: HOSPADM

## 2021-07-30 RX ORDER — HEPARIN SODIUM (PORCINE) LOCK FLUSH IV SOLN 100 UNIT/ML 100 UNIT/ML
500 SOLUTION INTRAVENOUS PRN
Status: DISCONTINUED | OUTPATIENT
Start: 2021-07-30 | End: 2021-07-31 | Stop reason: HOSPADM

## 2021-07-30 RX ADMIN — Medication 500 UNITS: at 12:07

## 2021-07-30 RX ADMIN — SODIUM CHLORIDE, PRESERVATIVE FREE 10 ML: 5 INJECTION INTRAVENOUS at 12:06

## 2021-07-30 NOTE — PROGRESS NOTES
Patient presents to clinic for [facility] today. Right chest single  SQ port accessed per policy using [id number] [id number] Campos needle for good blood return. Site flushed easily with 10 mL NSS followed by 5 mL Heparin solution 100 units/ml rinse prior to de-access. Dry sterile dressing to area. Tolerated procedure well. Encouraged to schedule port flush every 4 weeks.

## 2021-07-30 NOTE — PROGRESS NOTES
[place] [id number]  Attending Clinic Note     Reason for Visit: Follow-up on a patient with Metastatic Rectal Cancer.     PCP: Rosario Cervantes MD     History of Present Illness:  67 y/o  male who was referred to see Dr. Emerson Ho (GI team) for evaluation of bright red blood per rectum, mild anemia and change in bowel habits with diarrhea. CEA 2640 on 10/19/2015. AlcP 299 AST 57 ALT 75 on 10/19/2015. Colonoscopy in 2013 noted no significant polyps, colitis or lesions at that time. Denies any Family History of colorectal cancer or polyps.     Colonoscopy on 10/19/2015 revealed:  1. Ascending polyp, 8 mm, hot snare: Tubulovillous adenoma. 2. Transverse polyp, 1 cm, hot snare: Serrated polyp most consistent with sessile serrated adenoma. 3. Five splenic flexure polyps, three - 5 mm, 7 mm, 8 mm, hot snare and biopsy: Four segments of Tubular Adenoma  4. Descending polyp, 5 mm, biopsy: Tubular adenoma. 5. Sigmoid polyp, 4 mm biopsy, Serrated polyp most consistent with sessile serrated adenoma. 6. Two rectal polyps, 4 mm biopsy: Serrated polyp most consistent with hyperplastic polyp. 7. Rectosigmoid colon mass (large mass approximately 65% circumference of the lumen; Very friable, firm and hard): Tubulovillous adenoma with associated focal erosion and fibroplasia.      CT scan abdomen/pelvis on 10/26/2015:  1. Small nodules at lung bases likely represent metastatic colon   cancer. 2. Extensive likely metastatic colon cancer throughout the liver.      3. Mild mural thickening and luminal narrowing in the   terminal ileum, otherwise nonspecific.      Colonoscopy with snare removal rectal mass was performed by Dr. Bertha Mckeon. Pathology proved:  Rectal polyp: Invasive adenocarcinoma involving villous adenoma and extending to the cauterized edge of excision. KRAS Mutation: Mutation detected. BRAF Mutation: Mutation not detected.   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     CEA 3488. Bone scan on 11/10/2015 noted no metastatic disease. CT chest on 11/10/2015 revealed Multiple pulmonary nodules in the upper and lower lobes consistent with metastatic disease;   Hepatic metastasis also visualized. 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 Mediport was placed. Cycle # 1 of FOLFOX + Avastin was on 11/23/2015. CEA was 4555 on 11/23/2015. Cycle # 2 of FOLFOX + Avastin was on 12/08/2015. CEA was 3160 on 12/08/2015. Cycle # 3 of FOLFOX + Avastin was on 12/22/2015. CEA was 2516 on 12/21/2015. Cycle # 4 of FOLFOX + Avastin was on 01/05/2016. CEA was 2098 on 01/05/2015. Cycle # 5 of FOLFOX + Avastin was on 01/19/2016. CEA was 1511 on 01/05/2015.     -Bone scan on 01/26/2016 noted no metastatic disease. CT chest on 01/26/2016 revealed Significant response to treatment with no visible residual nodules. CT scan abdomen/pelvis on 01/26/2016 noted Interval decreased size of multiple masses in the liver compatible with treatment response. Continue another 2 months of FOLFOX + Avastin and repeat scans. Cycle # 6 of FOLFOX + Avastin was on 02/02/2016.  on 02/02/2016. Cycle # 7 of FOLFOX + Avastin was on 02/16/2016.  on 02/16/2016. Cycle # 8 of FOLFOX + Avastin was on 03/01/2016.  on 03/01/2016. Cycle # 9 of FOLFOX + Avastin was on 03/15/2016.  on 03/15/2016. Cycle # 10 of FOLFOX + Avastin was on 03/29/2016. .4 on 03/29/2016. Cycle # 11 of FOLFOX + Avastin was on 04/12/2016. .4 on 04/12/2016.     Re-staging scans on 04/19/2016: CT Chest: clear lungs; no evidence of recurring pulmonary nodule; CT Abdomen/Pelvis: Further interval decrease in size of the multiple metastatic hepatic lesions, the largest lesion now measures 3.9 x 3.5 cm and previously measured 4.5 cm in maximum diameter.  No mesenteric lymphadenopathy is identified; Bone Scan: No evidence of osseous metastasis. Continue same regimen and re-stage in 2-3 months. Cycle # 12 FOLFOX + Avastin was on 04/26/2016. .5 on 04/26/2016. Cycle # 13 FOLFOX + Avastin was on 05/10/2016. .3 on 05/10/2016. Cycle # 14 FOLFOX+AVASTIN was on 05/24/2016. .5 on 05/24/2016. Cycle # 15 FOLFOX + Avastin was on 06/07/2016. CEA 90.5 on 06/07/2016. Ad[facility] to [facility]/13/2016-06/16/2016 for abdominal pain: EGD noted 1.5 cm clean based duodenal bulb ulceration s/p epinephrine and bicap per Dr. Steve Lemon. No active bleeding. A. Stomach, biopsy: Mild chronic gastritis, immunostain negative for Helicobacter  B. Esophagus, biopsy: Gastric glandular mucosa with prominent intestinal metaplasia ([place]'s epithelium), negative for epithelial dysplasia, esophageal squamous mucosa not identified  Cycle # 16 FOLFOX (discontinued avastin (bevacizumab) given association of peptic ulcer disease and known association of GI perforation) was on 06/21/2016. Cycle # 17 FOLFOX was on 07/05/2016. CEA 52.6 on 07/19/2016. Cycle # 17 FOLFOX was on 07/05/2016. CEA 52.6 on 07/05/2016. CEA 47.6 on 07/19/2016.     Re-staging scans 07/19/2106: CT Chest negative for metastatic disease. CT Abdomen/Pelvis: Stable hepatic lesions; Question new mural thickening in the cecum. Bone Scan: New Let hip lesion suspicious for bone metastasis.     Increased CEA; new mural thickening in the cecum and new left hip lesion suspicious for bone metastasis are consistent with disease progression; He derived maximum benefit from FOLFOX/AVASTIN. D/C FOLFOX/AVASTIN. We recommended FOLFIRI second line therapy. Cycle # 1 FOLFIRI was on 08/02/2016. CEA 40.8 on 08/02/2016. Xgeva q4 weeks started on 08/02/2016. Cycle # 2 FOLFIRI was on 08/16/2016. CEA 31.7 on 08/16/2016. Cycle # 3 FOLFIRI (added Avastin) was on 08/30/2016 given that ulcers healed on EGD 08/15/2016 by Dr. Sheree Lewis; Protonix bid since avastin re-started.    Colonoscopy on 08/29/2016 (to look at the cecal area) unremarkable. CEA 26.7 on 08/30/2016. Cycle # 4 FOLFIRI/Avastin was on 09/13/2016. CEA 23.4 on 09/13/2016. Cycle # 5 FOLFIRI/Avastin was on 09/27/2016. CEA 22.3 on 09/27/2016. Cycle # 6 FOLFIRI/Avastin was on 10/11/2016. CEA 18.4 on 10/11/2016.      Bone scan 10/21/2016 stable bone metastasis; ? New lesion anterior left sixth rib likely interval healing fracture. CT abdomen/pelvis revealed stable hepatic metastasis. CT chest negative for metastatic disease. Continue FOLFIRI/Avastin and repeat scans in 3 months. Cycle # 7 FOLFIRI/Avastin was on 10/25/2016. CEA 16.1  Cycle # 8 FOLFIRI/Avastin was on 11/08/2016. CEA 15.7  Cycle # 9 FOLFIRI/Avastin was on 11/29/2016. CEA 13.6 on 11/29/2016. Cycle # 10 FOLFIRI/Avastin was on 12/13/2016. CEA 10.6 on 12/13/2016. Cycle # 11 FOLFIRI/Avastin was on 12/27/2016. CEA 11.1 on 12/27/2016. Cycle # 12 FOLFIRI/Avastin was on 01/10/2017. CEA 9.7 on 01/10/2017.       CT chest 01/23/2017 No new pulmonary nodule or lymphadenopathy. Patchy groundglass opacities within the left lower lobe, suggestive of infectious or inflammatory etiology. Followup CT chest in 3 months. Slight increased linear sclerosis along the left anterior sixth rib corresponding to an area of increased uptake on the prior bone scan from 10/21/2016, favored to be related to interval healing changes of a nondisplaced fracture. CT abdomen/pelvis on 01/23/2017 Redemonstration of multiple hepatic metastases, which are similar compared to the prior CT from 10/21/2016. Redemonstration of area of increased sclerosis along the right acetabulum suspicious for metastatic disease. Bone scan on 01/23/2017 Stable subtle uptake within the left proximal diaphysis, again suggestive of metastatic disease  Decreased subtle uptake within the medial right acetabulum.    Decreased uptake within the left anterior sixth rib corresponding to linear sclerosis on the recent CT, favored to be related to an joint rib fracture. Continue FOLFIRI + Avastin and repeat scans in 3 months. Cycle # 13 FOLFIRI + Avastin was on 01/24/2017. Cycle # 14 FOLFIRI + Avastin was on 02/07/2017. Cycle # 15 FOLFIRI + Avastin was on 02/21/2017. Cycle # 16 FOLFIRI + Avastin was on 03/07/2017. Cycle # 17 FOLFIRI + Avastin was on 03/21/2017. Cycle # 18 FOLFIRI + Avastin was on 04/04/2017. CT chest 04/17/2017 negative for metastatic disease. CT abdomen/pelvis 04/17/2017 Stable hypodense metastatic lesions within the liver. Stable area of increased sclerosis along the right acetabulum  Bone scan 04/17/2017 Stable subtle uptake within the left proximal femoral diaphysis; No definite abnormal uptake in the region of a sclerotic lesion along the posterior column of the right acetabulum noted on CT. Stable slight uptake within the left anterior sixth rib corresponding to linear sclerosis on the recent CT, favored to be related to a fracture. Continue FOLFIRI + Avastin and repeat scans in 3 months. Cycle # 19 FOLFIRI + Avastin was on 04/18/2017. Cycle # 20 FOLFIRI + Avastin was on 05/02/2017. Cycle # 21 FOLFIRI + Avastin was on 05/16/2017. Cycle # 22 FOLFIRI + Avastin was on 05/30/2017. Cycle # 23 FOLFIRI + Avastin was on 06/13/2017. Cycle # 24 FOLFIRI + Avastin was on 06/27/2017. Cycle # 25 FOLFIRI + Avastin was on 07/11/2017. Cycle # 26 FOLFIRI + Avastin was on 07/25/2017. Cycle # 27 FOLFIRI + Avastin was on 08/08/2017. Cycle # 28 FOLFIRI + Avastin was on 08/22/2017. Cycle # 29 FOLFIRI + Avastin was on 09/05/2017. Cycle # 30 FOLFIRI + Avastin was on 09/19/2017. Bone scan 09/26/2017 stable. CT abdomen/pelvis on 09/26/2017 Stable hypodense mass in the liver. Stable sclerotic lesion in the acetabulum. CT chest 09/26/2017 negative for metastatic disease. Cycle # 31 FOLFIRI + Avastin was on 10/03/2017. CEA 7.4 on 10/03/2017. Cycle # 32 FOLFIRI + Avastin was on 10/17/2017. CEA 6.0 on 10/17/2017.   Cycle # 33 FOLFIRI + Avastin was on 10/31/2017. CEA 6.8 on 10/31/2017. Cycle # 34 FOLFIRI + Avastin was on 11/14/2017. CEA 7.2 on 11/14/2017. Cycle # 35 FOLFIRI + Avastin was on 11/28/2017. CEA 5.1 on 11/28/2017. Cycle # 36 FOLFIRI + Avastin was on 12/12/2017. CEA 6.1 on 12/12/2017. Bone scan 12/22/2017 noted no evidence of metastatic disease to the axial or appendicular skeleton. CT chest 12/22/2017 noted no evidence of metastatic disease. CT abdomen/pelvis 12/22/2017 noted stable hypodense lesions in the liver. Continue FOLFIRI + Avastin and repeat scans in 3 months. Cycle # 37 FOLFIRI + Avastin was on 12/27/2018. Cycle # 38 FOLFIRI + Avastin was on 01/09/2018. Cycle # 39 FOLFIRI + Avastin was on 01/23/2018. Cycle # 40 FOLFIRI + Avastin was on 02/06/2018. Cycle # 41 FOLFIRI + Avastin was on 02/20/2018. Cycle # 42 FOLFIRI + Avastin was on 03/06/2018. Cycle # 43 FOLFIRI + Avastin was on 03/20/2018. Bone scan on 03/26/2018 negative for metastatic disease. CT chest 03/26/2018 noted ? new 7 mm nodule in LUCY. CT abdomen/pelvis 03/26/2018 noted stable hypodense lesions in the liver. ? New small ascites in the abdomen. Patient wants to continue to monitor the lung finding and repeat scans in 2 months instead of changing the current regimen into oral Lonsurf. Cycle # 44 FOLFIRI + Avastin was on 04/03/2018. CEA 6.3 on 04/03/2018. Cycle # 45 FOLFIRI + Avastin was on 04/24/2018. CEA 5.3 on 04/24/2018. Cycle # 46 FOLFIRI + Avastin was on 05/08/2018. CEA 5.4 on 05/08/2018. Cycle # 47 FOLFIRI + Avastin was on 05/22/2018. CEA 6.1 on 05/22/2018. CT chest 05/31/2018 noted stable nodule in LUCY. No evidence of worsening malignancy. CT abdomen/pelvis on 05/31/2018 noted stable liver metastasis. No evidence of worsening malignancy. Continue FOLFIRI + Avastin and repeat scans in 3 months. Cycle # 48 FOLFIRI + Avastin was on 06/06/2018. Cycle # 49 FOLFIRI + Avastin was on 06/19/2018.    Cycle # 50 FOLFIRI + Avastin was on 07/03/2018. Cycle # 51 FOLFIRI + Avastin was on 07/24/2018. Cycle # 52 FOLFIRI + Avastin was on 08/14/2018. Cycle # 53 FOLFIRI + Avastin was on 08/28/2018. CT chest 09/06/2018 noted interval decrease in size of LUCY measuring up to 4 mm, suggestive of treatment response. No mediastinal or hilar LN  CT abdomen/pelvis 09/06/2018 Slight interval increase in size of a previously noted metastatic lesion within the right hepatic lobe. This may be related to differences in phase of enhancement compared to the most recent study from 5/31/2018, the lesion remains decreased in size compared to the more previous studies. No abdominal, retroperitoneal, or pelvic lymphadenopathy. Continue FOLFIRI + Avastin and repeat scans in 2 months. Cycle # 54 FOLFIRI + Avastin was on 09/11/2018. Cycle # 55 FOLFIRI + Avastin was on 09/25/2018. Cycle # 56 FOLFIRI + Avastin was on 10/09/2018. Cycle # 57 FOLFIRI + Avastin was on 10/23/2018. CT chest 11/02/2018 stable 3 mm nodule within LUCY. No new right and left lung nodule. No mediastinal or osseous lesion. CT abdomen/pelvis 11/02/2018 stable metastatic disease to liver. No new metastatic disease identified. No mesenteric or retroperitoneal LN. No gross colonic lesion identified. Continue FOLFIRI + Avastin and repeat scans in 3 months. Cycle # 58 FOLFIRI + Avastin was on 11/06/2018. CEA 7.6 on 11/05/2018. Cycle # 59 FOLFIRI + Avastin was on 11/27/2018. CEA 6.9 on 11/26/2018. Cycle # 60 FOLFIRI + Avastin was on 12/11/2018. CEA 6.7 on 12/11/2018. Cycle # 61 FOLFIRI + Avastin was on 01/08/2019. CEA 5.6 on 01/07/2019. Cycle # 62 FOLFIRI + Avastin was on 01/29/2019. CEA 4.6 on 01/28/2019. Cycle # 63 FOLFIRI + Avastin was on 02/12/2019. CEA 4.3 on 02/11/2019. CT chest 02/21/2019 no evidence of metastatic disease. CT abdomen/pelvis 02/21/2019 stable hypodense liver lesions. No evidence of worsening metastatic disease.  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 Ordered MRI thoracic spine and referred to neurosurgery team.    Cycle # 64 FOLFIRI + Avastin was on 02/26/2019. CEA 4.7 on 02/25/2019. Cycle # 65 FOLFIRI + Avastin was on 03/12/2019. CEA 4.0 on 03/11/2019. Cycle # 66 FOLFIRI + Avastin was on 03/26/2019. CEA 4.7 on 03/25/2019. Cycle # 67 FOLFIRI + Avastin was on 04/09/2019. CEA 5.6 on 04/08/2019. Cycle # 68 FOLFIRI + Avastin was on 04/30/2019. CEA 4.9 on 04/29/2019. Cycle # 69 FOLFIRI + Avastin was on 05/14/2019. CEA 5.3 on 05/14/2019. CT chest 05/23/2019 stable small lung nodule with no evidence of metastatic disease. CT abdomen/pelvis 05/23/2019 Decrease conspicuity of the liver lesions. No new lesions seen. Stable splenomegaly. Continue FOLFIRI + Avastin and repeat scans in 3 months. Cycle # 70 FOLFIRI + Avastin was on 06/04/2019. CEA 4.8 on 06/03/2019. Cycle # 71 FOLFIRI + Avastin was on 06/18/2019. CEA 4.6 on 06/17/2019. Cycle # 72 FOLFIRI + Avastin was on 07/09/2019. CEA 4.3 on 07/08/2019. Cycle # 73 FOLFIRI + Avastin was on 07/30/2019. CEA 5.0 on 07/29/2019. Cycle # 74 FOLFIRI + Avastin was on 08/13/2019. CEA 5.0 on 08/12/2019. CT chest 08/20/2019 negative  CT abdomen/pelvis 08/20/2019 Previous identified hepatic lesions are not visualized on the current exam.  Continue FOLFIRI + Avastin and repeat scans in 3 months. Cycle # 75 FOLFIRI + Avastin was on 08/27/2019. CEA 5.0 on 08/26/2019. Cycle # 76 FOLFIRI + Avastin was on 09/17/2019. CEA 5.5 on 09/16/2019. Cycle # 77 FOLFIRI + Avastin was on 10/15/2019. CEA 4.6 on 10/15/2019. Cycle # 78 FOLFIRI + Avastin was on 10/29/2019. CEA 4.1 on 10/28/2019. Cycle # 79 FOLFIRI + Avastin was on 11/12/2019. CEA 4.3 on 11/12/2019. Cycle # 80 FOLFIRI + Avastin was on 12/10/2019. CEA 4.0 on 12/09/2019.   CT chest 12/19/2019 Stable 3 mm nodule within the left upper lobe, similar to the previous studies dating back to 11/2/2018, however decreased in size compared to the CT from 3/26/2018. No thoracic LN. CT abdomen/pelvis 12/19/2019 Previously described small hypodense lesions are more conspicuous on the current study compared to the recent study throughout the liver from 8/20/2019, however similar to the prior study from 2/21/2019. Findings may be related to differences in contrast opacification. No abdominal or pelvic lymphadenopathy. Continue FOLFIRI + Avastin and repeat scans in 2 months to f/u on liver lesions. Cycle # 81 FOLFIRI + Avastin was on 01/07/2020. CEA 3.8 on 01/06/2020. Cycle # 82 FOLFIRI + Avastin was on 01/21/2020. CEA 3.7 on 01/20/2020. Cycle # 83 FOLFIRI + Avastin was on 02/04/2020. CEA 4.1 on 02/03/2020. Cycle # 84 FOLFIRI + Avastin was on 02/18/2020. CEA 4.6 on 02/17/2020. EGD by Dr. Wing Brooks 03/02/2020 showing no masses or lesions  Cycle # 85 FOLFIRI + Avastin was on 03/03/2020. CEA 7.4 on 03/02/2020. Cycle # 86 FOLFIRI + Avastin was on 03/17/2020. CEA 4.5 on 03/16/2020. Colonoscopy on 03/23/2020 by Dr. Wing Todd aguero (records requested). Cycle # 87 FOLFIRI + Avastin was on 03/31/2020. CEA 4.1 on 03/30/2020. Cycle # 88 FOLFIRI + Avastin was on 04/21/2020. CEA 6.4 on 04/20/2020. Cycle # 89 FOLFIRI + Avastin was on 05/05/2020. CEA 5.8 on 05/04/2020. Cycle # 90 FOLFIRI + Avastin was on 06/02/2020. CEA 5.5 on 06/01/2020. Cycle # 91 FOLFIRI + Avastin was on 06/16/2020. CEA 5.6 on 06/15/2020. CT chest 06/23/2020 noted no metastatic disease in the chest.   CT abdomen/pelvis 06/23/2020 Stable small liver lesions measuring up to 5 mm since 2019.   22 mm round mass in segment 8 of the liver with central high density stable from December 2019), previously measuring up to 34 mm in 2017. No additional metastatic disease in the abdomen or pelvis. Small amount of ascites. Overall stable disease. Continue FOLFIRI + Avastin and repeat scans in 2-3 months. Cycle # 92 FOLFIRI + Avastin was on 07/07/2020. CEA 5.7 on 07/06/2020. Cycle # 93 FOLFIRI + Avastin was on 07/21/2020. CEA 5.9 on 07/20/2020. Cycle # 94 FOLFIRI + Avastin was on 08/04/2020. CEA 5.5 on 08/04/2020. Cycle # 95 FOLFIRI + Avastin was on 08/25/2020. CEA 6.1 on 08/24/2020. CT chest 9/2/2020 stable unremarkable enhanced CT of the thorax. There is no metastatic disease to the lungs. CT abdomen pelvis on 9/2/2020 stable 2.2 cm low attenuated lesion within the central aspect of the right hepatic lobe favored to represent treated metastatic disease. No new hepatic lesion is identified. Stable splenomegaly  There is no appreciable colonic lesion or stricture  Pericholecystic fluid of uncertain etiology. General surgery team consulted. Laparoscopic cholecystectomy with normal cholangiogram 10/27/20  Gallbladder: Chronic cholecystitis and cholelithiasis.  Unremarkable regional lymph node. 11/13/2020; Seen by Dr. Apryl Schwab from General surgery team; cleared to re-start chemotherapy. Cycle # 96 FOLFIRI + Avastin was on 11/17/2020. CEA 3.6 on 11/16/2020. Cycle # 97 FOLFIRI + Avastin was on 12/01/2020. CEA 3.5 on 11/30/2020. Cycle # 98 FOLFIRI + Avastin was on 12/15/2020. CEA 4.3 on 12/15/2020. Cycle # 99 FOLFIRI + Avastin was on 01/05/2021. CEA 4.7 on 01/04/2021. CT chest 01/13/2021 negative for metastatic disease. CT abdomen/pelvis 01/13/2021 Unchanged central hepatic lesion. No specific signs of abdominopelvic metastatic disease. Continue FOLFIRI + Avastin and repeat scans in 3 months. Cycle # 100 FOLFIRI + Avastin was on 01/19/2021. CEA 4.7 on 01/18/2021. Cycle # 101 FOLFIRI + Avastin was on 02/02/2021. CEA 5.2 on 02/01/2021. Cycle # 102 FOLFIRI + Avastin was on 02/16/2021. CEA 5.6 on 02/15/2021. Cycle # 103 FOLFIRI + Avastin was on 03/02/2021. Cycle # 104 FOLFIRI (20% dose reduced due to significant toxicity) + Avastin was on 03/16/2021. Cycle # 105 FOLFIRI (same dose as cycle # 104) + Avastin was on 03/30/2021. CEA 6.5 on 03/29/2021.   Cycle # 106 FOLFIRI (same dose as cycle # 104) + Avastin was on 04/13/2021. CEA 6.0 on 04/12/2021  CT chest 04/20/2021 no evidence of metastatic disease. CT abdomen/pelvis 04/20/2021 No evidence of progressive metastatic disease. Stable 2 cm lesion in the right lobe of the liver, likely representing treated metastatic disease. Imaging reviewed. Continue FOLFIRI + Avastin and repeat scans in 3 months  Cycle # 107 FOLFIRI + Avastin was on 04/27/2021  Cycle # 108 FOLFIRI + Avastin was on 05/11/2021. Cycle # 109 FOLFIRI (held Avastin due to upcoming surgery) on 05/25/2021. CEA 6.3 on 5/24/21  Cycle # 110 FOLFIRI (held Avastin due to upcoming surgery) on 06/08/2021. CEA 6.3 on 6/07/21. Right inguinal/scrotol hernia repair on 07/13/2021 with Dr Anita Taylor with folely removal on Friday 07/23/2021. He developed fever per patient up to 106 over night on 07/24/2021 and was brought to [address] [place], [place] [address] ER via EMS; Sepsis secondary to urinary tract infection   Hasn't completed antibiotics yet. Today 07/30/2021 for f/u. No fever, chills. No nausea/vomiting. Fair appetite. Review of Systems;  CONSTITUTIONAL: No fever, chills. Fair appetite and energy level  ENMT: Eyes: No diplopia; Nose: No epistaxis. Mouth:No lesions  RESPIRATORY: No hemoptysis, shortness of breath. CARDIOVASCULAR: No chest pain, palpitations. GASTROINTESTINAL:No N/V. Recent hernia surgery by Dr. Argueta [place]: No dysuria, urinary frequency, hematuria. NEURO: No syncope, presyncope, headache. Remainder ROS: Negative. Past Medical History:   Past Medical History               Diagnosis Date    Arthritis      Cancer ([facility] [address]         colon    Depression      Hyperlipidemia      Hypertension           Medications:  Reviewed and reconciled.     Allergies:        Allergies   Allergen Reactions    Neosporin [Neomycin-Polymyxin-Gramicidin] Rash    [place] Tape] Rash      Physical Exam:  /65   Pulse 65   Temp 98.7 °F (37.1 °C)   Resp 18   Ht 6' 1\" (1.854 m)   Wt 195 lb 6.4 oz (88.6 kg)   SpO2 97%   BMI 25.78 kg/m²   GENERAL: Alert, oriented x 3, not in distress  HEENT: PERRLA, EOMI. NECK: Supple. Without lymphadenopathy. LUNGS: Lungs are CTA bilaterally, with no wheezing, crackles or rhonchi. CARDIOVASCULAR: Regular rhythm. No murmurs, rubs or gallops. ABDOMEN: Soft. Non-tender, non-distended. Positive bowel sounds. hernia  EXTREMITIES: Without clubbing, cyanosis or edema. NEUROLOGIC: No focal deficits. ECOG PS 1    Diagnostics:  Lab Results   Component Value Date    WBC 4.1 (L) 07/27/2021    HGB 8.5 (L) 07/27/2021    HCT 28.2 (L) 07/27/2021    MCV 99.6 07/27/2021    PLT 99 (L) 07/27/2021     Lab Results   Component Value Date     07/27/2021    K 3.7 07/27/2021     07/27/2021    CO2 21 (L) 07/27/2021    BUN 20 07/27/2021    CREATININE 1.0 07/27/2021    GLUCOSE 89 07/27/2021    CALCIUM 7.4 (L) 07/27/2021    PROT 5.3 (L) 07/26/2021    LABALBU 2.6 (L) 07/26/2021    BILITOT 0.9 07/26/2021    ALKPHOS 186 (H) 07/26/2021    AST 30 07/26/2021    ALT 15 07/26/2021    LABGLOM >60 07/27/2021    GFRAA >60 07/27/2021     Lab Results   Component Value Date    CEA 5.8 (H) 06/28/2021     Impression/Plan:  65 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 KRAS Mutation: Mutation detected. BRAF Mutation: Mutation not detected. 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 CT scan abdomen/pelvis on 10/26/2015 revealed small nodules at the lung bases, extensive liver lesions consistent with metastatic rectosigmoid cancer. CEA 3488 on 10/30/2015. Bone scan on 11/10/2015 noted no metastatic disease. CT chest on 11/10/2015 revealed Multiple pulmonary nodules in the upper and lower lobes consistent with metastatic disease; Hepatic metastasis also visualized. 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 Mediport was placed.   Cycle # 1 of FOLFOX + Avastin was on 11/23/2015. CEA was 4555 on 11/23/2015. Cycle # 2 of FOLFOX + Avastin was on 12/08/2015. CEA was 3160 on 12/08/2015. Cycle # 3 of FOLFOX + Avastin was on 12/22/2015. CEA was 2516 on 12/21/2015. Cycle # 4 of FOLFOX + Avastin was on 01/05/2016. CEA was 2098 on 01/05/2015. Cycle # 5 of FOLFOX + Avastin was on 01/19/2016. CEA was 1511 on 01/05/2015.     -Bone scan on 01/26/2016 noted no metastatic disease. CT chest on 01/26/2016 revealed Significant response to treatment with no visible residual nodules. CT scan abdomen/pelvis on 01/26/2016 noted Interval decreased size of multiple masses in the liver compatible with treatment response. Continue another 2 months of FOLFOX + Avastin and repeat scans. Cycle # 6 of FOLFOX + Avastin was on 02/02/2016.  on 02/02/2016. Cycle # 7 of FOLFOX + Avastin was on 02/16/2016.  on 02/16/2016. Cycle # 8 of FOLFOX + Avastin was on 03/01/2016.  on 03/01/2016. Cycle # 9 of FOLFOX + Avastin was on 03/15/2016.  on 03/15/2016. Cycle # 10 of FOLFOX + Avastin was on 03/29/2016. .4 on 03/29/2016. Cycle # 11 of FOLFOX + Avastin was on 04/12/2016. .4 on 04/12/2016.     Re-staging scans on 04/19/2016: CT Chest: clear lungs; no evidence of recurring pulmonary nodule; CT Abdomen/Pelvis: Further interval decrease in size of the multiple metastatic hepatic lesions, the largest lesion now measures 3.9 x 3.5 cm and previously measured 4.5 cm in maximum diameter. No mesenteric lymphadenopathy is identified; Bone Scan: No evidence of osseous metastasis. Continue same regimen and re-stage in 2-3 months. Cycle # 12 FOLFOX + Avastin was on 04/26/2016. .5 on 04/26/2016. Cycle # 13 FOLFOX + Avastin was on 05/10/2016. .3 on 05/10/2016. Cycle # 14 FOLFOX+AVASTIN was on 05/24/2016. .5 on 05/24/2016. Cycle # 15 FOLFOX + Avastin was on 06/07/2016. CEA 90.5 on 06/07/2016.    Admitted to [facility] 06/13/2016-06/16/2016 for abdominal pain: EGD noted 1.5 cm clean based duodenal bulb ulceration s/p epinephrine and bicap per Dr. Becky Estevez. No active bleeding. A. Stomach, biopsy: Mild chronic gastritis, immunostain negative for Helicobacter  B. Esophagus, biopsy: Gastric glandular mucosa with prominent ntestinal metaplasia ([place]'s epithelium), negative for epithelial dysplasia, esophageal squamous mucosa not identified     Cycle # 16 FOLFOX (discontinued avastin (bevacizumab) given association of peptic ulcer disease and known association of GI perforation.) was on 06/21/2016. CEA 47 on 06/21/2016. Cycle # 17 FOLFOX was on 07/05/2016. CEA 52.6 on 07/05/2016. CEA 47.6 on 07/19/2016.     Re-staging scans 07/19/2106: CT Chest negative for metastatic disease. CT Abdomen/Pelvis: Stable hepatic lesions; Question new mural thickening in the cecum. Bone Scan: New Let hip lesion suspicious for bone metastasis.     Increased CEA; new mural thickening in the cecum and new left hip lesion suspicious for bone metastasis are consistent with disease progression; He derived maximum benefit from FOLFOX/AVASTIN. D/C FOLFOX/AVASTIN. We recommended FOLFIRI second line therapy. Cycle # 1 FOLFIRI was on 08/02/2016. CEA 40.8 on 08/02/2016. Xgeva q4 weeks started on 08/02/2016. Cycle # 2 FOLFIRI was on 08/16/2016. CEA 31.7 on 08/16/2016. Cycle # 3 FOLFIRI (added Avastin) was on 08/30/2016 given that ulcers healed on EGD 08/15/2016 by Dr. Tuan Bates; Protonix bid since avastin re-started. Colonoscopy on 08/29/2016 (to look at the cecal area) unremarkable. CEA 26.7 on 08/30/2016. Cycle # 4 FOLFIRI/Avastin was on 09/13/2016. CEA 23.4 on 09/13/2016. Cycle # 5 FOLFIRI/Avastin was on 09/27/2016. CEA 22.3 on 09/27/2016. Cycle # 6 FOLFIRI/Avastin was on 10/11/2016. CEA 18.4 on 10/11/2016.      Bone scan 10/21/2016 stable bone metastasis; ? New lesion anterior left sixth rib likely interval healing fracture. CT abdomen/pelvis revealed stable hepatic metastasis.  CT chest negative for metastatic disease. Continue FOLFIRI/Avastin and repeat scans in 3 months. Cycle # 7 FOLFIRI/Avastin was on 10/25/2016. CEA 16.1 on 10/25/2016. Cycle # 8 FOLFIRI/Avastin was on 11/08/2016. CEA 15.7 on 11/08/2016. Cycle # 9 FOLFIRI/Avastin was on 11/29/2016. CEA 13.6 on 11/29/2016. Cycle # 10 FOLFIRI/Avastin was on 12/13/2016. CEA 10.6 on 12/13/2016. Cycle # 11 FOLFIRI/Avastin was on 12/27/2016. CEA 11.1 on 12/27/2016. Cycle # 12 FOLFIRI/Avastin was on 01/10/2017. CEA 9.7 on 01/10/2017. CT chest 01/23/2017 No new pulmonary nodule or lymphadenopathy. Patchy groundglass opacities within the left lower lobe, suggestive of infectious or inflammatory etiology. Followup CT chest in 3 months. Slight increased linear sclerosis along the left anterior sixth rib corresponding to an area of increased uptake on the prior bone scan from 10/21/2016, favored to be related to interval healing changes of a nondisplaced fracture. CT abdomen/pelvis on 01/23/2017 Redemonstration of multiple hepatic metastases, which are similar compared to the prior CT from 10/21/2016. Redemonstration of area of increased sclerosis along the right acetabulum suspicious for metastatic disease. Bone scan on 01/23/2017 Stable subtle uptake within the left proximal diaphysis, again suggestive of metastatic disease  Decreased subtle uptake within the medial right acetabulum. Decreased uptake within the left anterior sixth rib corresponding to linear sclerosis on the recent CT, favored to be related to an joint rib fracture. Continue FOLFIRI + Avastin and repeat scans in 3 months. Cycle # 13 FOLFIRI + Avastin was on 01/24/2017. Cycle # 14 FOLFIRI + Avastin was on 02/07/2017. Cycle # 15 FOLFIRI + Avastin was on 02/21/2017. Cycle # 16 FOLFIRI + Avastin was on 03/07/2017. Cycle # 17 FOLFIRI + Avastin was on 03/21/2017. Cycle # 18 FOLFIRI + Avastin was on 04/04/2017.    CT chest 04/17/2017 negative for metastatic disease. CT abdomen/pelvis 04/17/2017 Stable hypodense metastatic lesions within the liver. Stable area of increased sclerosis along the right acetabulum  Bone scan 04/17/2017 Stable subtle uptake within the left proximal femoral diaphysis; No definite abnormal uptake in the region of a sclerotic lesion along the posterior column of the right acetabulum noted on CT. Stable slight uptake within the left anterior sixth rib corresponding to linear sclerosis on the recent CT, favored to be related to a fracture. Continue FOLFIRI + Avastin and repeat scans in 3 months. Cycle # 19 FOLFIRI + Avastin was on 04/18/2017. CEA 7.5 on 04/18/2017. Cycle # 20 FOLFIRI + Avastin was on 05/02/2017. CEA 7.7 on 05/02/2017. Cycle # 21 FOLFIRI + Avastin was on 05/16/2017. CEA 7.1 on 05/16/2017. Cycle # 22 FOLFIRI + Avastin was on 05/30/2017. CEA 8.2 on 05/30/2017. Cycle # 23 FOLFIRI + Avastin was on 06/13/2017. CEA 7.7 on 06/13/2017. Cycle # 24 FOLFIRI + Avastin was on 06/27/2017. CEA 6.6 on 06/27/2017. Re-staging scans 07/05/2017:  Bone scan stable proximal left femoral diaphysis, right acetabulum, and left anterior sixth rib lesions;  CT abdomen/pelvis stable hypodense metastatic lesions within the liver; CT chest without convincing evidence of metastatic disease. Cycle # 25 FOLFIRI + Avastin was on 07/11/2017. CEA 6.3 on 07/11/2017. Cycle # 26 FOLFIRI + Avastin was on 07/25/2017. CEA 7.3 on 07/25/2017. Cycle # 27 FOLFIRI + Avastin was on 08/08/2017. CEA 6.5 on 08/08/2017. Cycle # 28 FOLFIRI + Avastin was on 08/22/2017. CEA 5.9 on 08/22/2017. Cycle # 29 FOLFIRI + Avastin was on 09/05/2017. CEA 6.3 on 09/05/2017. Cycle # 30 FOLFIRI + Avastin was on 09/19/2017. CEA 6.3 on 09/19/2017. Bone scan 09/26/2017 stable. CT abdomen/pelvis on 09/26/2017 Stable hypodense mass in the liver. Stable sclerotic lesion in the acetabulum. CT chest 09/26/2017 negative for metastatic disease.   Cycle # 31 FOLFIRI + Avastin was on 10/03/2017. CEA 7.4 on 10/03/2017. Cycle # 32 FOLFIRI + Avastin was on 10/17/2017. CEA 6.0 on 10/17/2017. Cycle # 33 FOLFIRI + Avastin was on 10/31/2017. CEA 6.8 on 10/31/2017. Cycle # 34 FOLFIRI + Avastin was on 11/14/2017. CEA 7.2 on 11/14/2017. Cycle # 35 FOLFIRI + Avastin was on 11/28/2017. CEA 5.1 on 11/28/2017. Cycle # 36 FOLFIRI + Avastin was on 12/12/2017. CEA 6.1 on 12/12/2017. Bone scan 12/22/2017 noted no evidence of metastatic disease to the axial or appendicular skeleton. CT chest 12/22/2017 noted no evidence of metastatic disease. CT abdomen/pelvis 12/22/2017 noted stable hypodense lesions in the liver. Continue FOLFIRI + Avastin and repeat scans in 3 months. Cycle # 37 FOLFIRI + Avastin was on 12/27/2018. CEA 6.7 on 12/27/2017. Cycle # 38 FOLFIRI + Avastin was on 01/09/2018. CEA 5.0 on 01/09/2018. Cycle # 39 FOLFIRI + Avastin was on 01/23/2018. CEA 6.5 on 01/23/2018. Cycle # 40 FOLFIRI + Avastin was on 02/06/2018. CEA 6.9 on 02/06/2018. Cycle # 41 FOLFIRI + Avastin was on 02/20/2018. CEA 6.4 on 02/20/2018. Cycle # 42 FOLFIRI + Avastin was on 03/06/2018. CEA 6.6 on 03/06/2018. Cycle # 43 FOLFIRI + Avastin was on 03/20/2018. CEA 5.7 on 03/20/2018. Bone scan on 03/26/2018 negative for metastatic disease. CT chest 03/26/2018 noted ? new 7 mm nodule in LUCY. CT abdomen/pelvis 03/26/2018 noted stable hypodense lesions in the liver. ? New small ascites in the abdomen. Patient wants to continue to monitor the lung finding and repeat scans in 2 months instead of changing the current chemotherapy regimen to oral Lonsurf. Cycle # 44 FOLFIRI + Avastin was on 04/03/2018. CEA 6.3 on 04/03/2018. Cycle # 45 FOLFIRI + Avastin was on 04/24/2018. CEA 5.3 on 04/24/2018. Cycle # 46 FOLFIRI + Avastin was on 05/08/2018. CEA 5.4 on 05/08/2018. Cycle # 47 FOLFIRI + Avastin was on 05/22/2018. CEA 6.1 on 05/22/2018. CT chest 05/31/2018 noted stable nodule in LUCY.  No evidence of worsening malignancy. CT abdomen/pelvis on 05/31/2018 noted stable liver metastasis. No evidence of worsening malignancy. Continue FOLFIRI + Avastin and repeat scans in 3 months. Cycle # 48 FOLFIRI + Avastin was on 06/06/2018. CEA 6.3 on 06/05/2018. Cycle # 49 FOLFIRI + Avastin was on 06/19/2018. CEA 6.1 on 06/19/2018. Cycle # 50 FOLFIRI + Avastin was on 07/03/2018. CEA 7.4 on 07/03/2018. Cycle # 51 FOLFIRI + Avastin was on 07/24/2018. CEA 6.7 on 07/24/2018. Cycle # 52 FOLFIRI + Avastin was on 08/14/2018. CEA 6.8 on 08/14/2018. Cycle # 53 FOLFIRI + Avastin was on 08/28/2018. CEA 6.5 on 08/27/2018. CT chest 09/06/2018 noted interval decrease in size of LUCY measuring up to 4 mm, suggestive of treatment response. No mediastinal or hilar LN  CT abdomen/pelvis 09/06/2018 Slight interval increase in size of a previously noted metastatic lesion within the right hepatic lobe. This may be related to differences in phase of enhancement compared to the most recent study from 5/31/2018, the lesion remains decreased in size compared to the more previous studies. No abdominal, retroperitoneal, or pelvic lymphadenopathy. Continue FOLFIRI + Avastin and repeat scans in 2 months. Cycle # 54 FOLFIRI + Avastin was on 09/11/2018. CEA 6.4 on 09/10/2018. Cycle # 55 FOLFIRI + Avastin was on 09/25/2018. CEA 6.4 on 09/25/2018. Cycle # 56 FOLFIRI + Avastin was on 10/09/2018. CEA 6.7 on 10/08/2018. Cycle # 57 FOLFIRI + Avastin was on 10/23/2018. CEA 7.2 on 10/22/2018. CT chest 11/02/2018 stable 3 mm nodule within LUCY. No new right and left lung nodule. No mediastinal or osseous lesion. CT abdomen/pelvis 11/02/2018 stable metastatic disease to liver. No new metastatic disease identified. No mesenteric or retroperitoneal LN. No gross colonic lesion identified. Continue FOLFIRI + Avastin and repeat scans in 3 months. Cycle # 58 FOLFIRI + Avastin was on 11/06/2018. CEA 7.6 on 11/05/2018.   Cycle # 59 FOLFIRI + Avastin was on 11/27/2018. CEA 6.9 on 11/26/2018. Cycle # 60 FOLFIRI + Avastin was on 12/11/2018. CEA 6.7 on 12/11/2018. Cycle # 61 FOLFIRI + Avastin was on 01/08/2019. CEA 5.6 on 01/07/2019. Cycle # 62 FOLFIRI + Avastin was on 01/29/2019. CEA 4.6 on 01/28/2019. Cycle # 63 FOLFIRI + Avastin was on 02/12/2019. CEA 4.3 on 02/11/2019. CT chest 02/21/2019 no evidence of metastatic disease. CT abdomen/pelvis 02/21/2019 stable hypodense liver lesions. No evidence of worsening metastatic disease. 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 Ordered MRI thoracic spine and referred to neurosurgery team.  Cycle # 64 FOLFIRI + Avastin was on 02/26/2019. CEA 4.7 on 02/25/2019. Cycle # 65 FOLFIRI + Avastin was on 03/12/2019. CEA 4.0 on 03/11/2019. Cycle # 66 FOLFIRI + Avastin was on 03/26/2019. CEA 4.7 on 03/25/2019. Cycle # 67 FOLFIRI + Avastin was on 04/09/2019. CEA 5.6 on 04/08/2019. Cycle # 68 FOLFIRI + Avastin was on 04/30/2019. CEA 4.9 on 04/29/2019. Cycle # 69 FOLFIRI + Avastin was on 05/14/2019. CEA 5.3 on 05/14/2019. CT chest 05/23/2019 stable small lung nodule with no evidence of metastatic disease. CT abdomen/pelvis 05/23/2019 Decrease conspicuity of the liver lesions. No new lesions seen. Stable splenomegaly. Continue FOLFIRI + Avastin and repeat scans in 3 months. Cycle # 70 FOLFIRI + Avastin was on 06/04/2019. CEA 4.8 on 06/03/2019. Cycle # 71 FOLFIRI + Avastin was on 06/18/2019. CEA 4.6 on 06/17/2019. Cycle # 72 FOLFIRI + Avastin was on 07/09/2019. CEA 4.3 on 07/08/2019. Cycle # 73 FOLFIRI + Avastin was on 07/30/2019. CEA 5.0 on 07/29/2019. Cycle # 74 FOLFIRI + Avastin was on 08/13/2019. CEA 5.0 on 08/12/2019. CT chest 08/20/2019 negative  CT abdomen/pelvis 08/20/2019 Previous identified hepatic lesions are not visualized on the current exam.  Continue FOLFIRI + Avastin and repeat scans in 3 months. Cycle # 75 FOLFIRI + Avastin was on 08/27/2019.  CEA 5.0 on 08/26/2019. Cycle # 76 FOLFIRI + Avastin was on 09/17/2019. CEA 5.5 on 09/16/2019. Cycle # 77 FOLFIRI + Avastin was on 10/15/2019. CEA 4.6 on 10/15/2019. Cycle # 78 FOLFIRI + Avastin was on 10/29/2019. CEA 4.1 on 10/28/2019. Cycle # 79 FOLFIRI + Avastin was on 11/12/2019. CEA 4.3 on 11/12/2019. Cycle # 80 FOLFIRI + Avastin was on 12/10/2019. CEA 4.0 on 12/09/2019. CT chest 12/19/2019 Stable 3 mm nodule within the left upper lobe, similar to the previous studies dating back to 11/2/2018, however decreased in size compared to the CT from 3/26/2018. No thoracic LN. CT abdomen/pelvis 12/19/2019 Previously described small hypodense lesions are more conspicuous on the current study compared to the recent study throughout the liver from 8/20/2019, however similar to the prior study from 2/21/2019. Findings may be related to differences in contrast opacification. No abdominal or pelvic lymphadenopathy. Continue FOLFIRI + Avastin and repeat scans in 2 months to f/u on liver lesions. Cycle # 81 FOLFIRI + Avastin was on 01/07/2020. CEA 3.8 on 01/06/2020. Cycle # 82 FOLFIRI + Avastin was on 01/21/2020. CEA 3.7 on 01/20/2020. Cycle # 83 FOLFIRI + Avastin was on 02/04/2020. CEA 4.1 on 02/03/2020. Cycle # 84 FOLFIRI + Avastin was on 02/18/2020. CEA 4.6 on 02/17/2020. CT chest 2/28/2020 no evidence of metastatic disease to the lungs and no mediastinal or hilar adenopathy. CT abdomen pelvis 2/28/2020 no enhancing lesions seen within the liver to suggest metastatic disease. Wall thickening of the rectosigmoid junction. Images reviewed. Continue FOLFIRI Avastin and repeat scans in 3 months  EGD by Dr. Lynda Comer 03/02/2020 showing no masses or lesions. Cycle # 85 FOLFIRI + Avastin was on 03/03/2020. CEA 7.4 on 03/02/2020. Cycle # 86 FOLFIRI + Avastin was on 03/17/2020. CEA 4.5 on 03/16/2020. Colonoscopy on 03/23/2020 by Dr. Lynda aguero (records requested).     Cycle # 87 FOLFIRI + Avastin was on 03/31/2020. CEA 4.1 on 03/30/2020. Cycle # 88 FOLFIRI + Avastin was on 04/21/2020. CEA 6.4 on 04/20/2020. Cycle # 89 FOLFIRI + Avastin was on 05/05/2020. CEA 5.8 on 05/04/2020. Cycle # 90 FOLFIRI + Avastin was on 06/02/2020. CEA 5.5 on 06/01/2020. Cycle # 91 FOLFIRI + Avastin was on 06/16/2020. CEA 5.6 on 06/15/2020. CT chest 06/23/2020 noted no metastatic disease in the chest.   CT abdomen/pelvis 06/23/2020 Stable small liver lesions measuring up to 5 mm since 2019.   22 mm round mass in segment 8 of the liver with central high density stable from December 2019), previously measuring up to 34 mm in 2017. No additional metastatic disease in the abdomen or pelvis. Small amount of ascites. Overall stable disease. Continue FOLFIRI + Avastin and repeat scans in 2-3 months. Cycle # 92 FOLFIRI + Avastin was on 07/07/2020. CEA 5.7 on 07/06/2020. Cycle # 93 FOLFIRI + Avastin was on 07/21/2020. CEA 5.9 on 07/20/2020. Cycle # 94 FOLFIRI + Avastin was on 08/04/2020. CEA 5.5 on 08/04/2020. Cycle # 95 FOLFIRI + Avastin was on 08/25/2020. CEA 6.1 on 08/24/2020. CT chest 9/2/2020 stable unremarkable enhanced CT of the thorax. There is no metastatic disease to the lungs. CT abdomen pelvis on 9/2/2020 stable 2.2 cm low attenuated lesion within the central aspect of the right hepatic lobe favored to represent treated metastatic disease. No new hepatic lesion is identified. Stable splenomegaly  There is no appreciable colonic lesion or stricture. Pericholecystic fluid of uncertain etiology. Laparoscopic cholecystectomy with normal cholangiogram 10/27/20  Gallbladder: Chronic cholecystitis and cholelithiasis.  Unremarkable regional lymph node. 11/13/2020; Seen by Dr. Olamide Heard from General surgery team; cleared to re-start chemotherapy. Cycle # 96 FOLFIRI + Avastin was on 11/17/2020. CEA 3.6 on 11/16/2020. Cycle # 97 FOLFIRI + Avastin was on 12/01/2020. CEA 3.5 on 11/30/2020.   Cycle # 98 FOLFIRI + Avastin was on 12/15/2020. CEA 4.3 on 12/15/2020. Cycle # 99 FOLFIRI + Avastin was on 01/05/2021. CEA 4.7 on 01/04/2021. CT chest 01/13/2021 negative for metastatic disease. CT abdomen/pelvis 01/13/2021 Unchanged central hepatic lesion. No specific signs of abdominopelvic metastatic disease. Continue FOLFIRI + Avastin and repeat scans in 3 months. Cycle # 100 FOLFIRI + Avastin was on 01/19/2021. CEA 4.7 on 01/18/2021. Cycle # 101 FOLFIRI + Avastin was on 02/02/2021. CEA 5.2 on 02/01/2021. Cycle # 102 FOLFIRI + Avastin was on 02/16/2021. CEA 5.6 on 02/15/2021. Cycle # 103 FOLFIRI + Avastin was on 03/02/2021. Cycle # 104 FOLFIRI (20% dose reduced due to significant toxicity) + Avastin was on 03/16/2021. Cycle # 105 FOLFIRI (same dose as cycle # 104) + Avastin was on 03/30/2021. CEA 6.5 on 03/29/2021. Cycle # 106 FOLFIRI (same dose as cycle # 104) + Avastin was on 04/13/2021. CEA 6.0 on 04/12/2021  CT chest 04/20/2021 no evidence of metastatic disease. CT abdomen/pelvis 04/20/2021 No evidence of progressive metastatic disease. Stable 2 cm lesion in the right lobe of the liver, likely representing treated metastatic disease. Imaging reviewed. Continue FOLFIRI + Avastin and repeat scans in 3 months  Cycle # 107 FOLFIRI + Avastin was on 04/27/2021  Cycle # 108 FOLFIRI + Avastin was on 05/11/2021. CEA 6.4 on 05/10/2021. Cycle # 109 FOLFIRI (held Avastin due to upcoming surgery) on 05/25/2021. CEA 6.3 on 5/24/21  Cycle # 110 FOLFIRI (held Avastin due to upcoming surgery) on 06/08/2021. CEA 6.3 on 6/07/21. CEA 5.8 on 06/28/2021. Right inguinal/scrotol hernia repair on 07/13/2021 with Dr Loraine Haro with folely removal on Friday 07/23/2021. He developed fever per patient up to 106 over night on 07/24/2021 and was brought to [address] [place], [place] [address] ER via EMS; Sepsis secondary to urinary tract infection   Hasn't completed antibiotics yet.   Complete Abx  F/U with General surgery for clearance to go back on chemo  RTC 08/17/2021 with prior scans and possible chemo     MSI testing noted no mismatch repair protein loss of expression    7/30/2021  Jackson Ahn MD  Board Certified Medical Oncologist

## 2021-08-06 ENCOUNTER — OFFICE VISIT (OUTPATIENT)
Dept: SURGERY | Age: 71
End: 2021-08-06

## 2021-08-06 VITALS
DIASTOLIC BLOOD PRESSURE: 65 MMHG | WEIGHT: 195 LBS | SYSTOLIC BLOOD PRESSURE: 123 MMHG | TEMPERATURE: 98 F | OXYGEN SATURATION: 98 % | HEIGHT: 73 IN | HEART RATE: 64 BPM | BODY MASS INDEX: 25.84 KG/M2

## 2021-08-06 DIAGNOSIS — T83.511S URINARY TRACT INFECTION ASSOCIATED WITH CATHETERIZATION OF URINARY TRACT, UNSPECIFIED INDWELLING URINARY CATHETER TYPE, SEQUELA: Primary | ICD-10-CM

## 2021-08-06 DIAGNOSIS — N39.0 URINARY TRACT INFECTION ASSOCIATED WITH CATHETERIZATION OF URINARY TRACT, UNSPECIFIED INDWELLING URINARY CATHETER TYPE, SEQUELA: Primary | ICD-10-CM

## 2021-08-06 PROCEDURE — 99024 POSTOP FOLLOW-UP VISIT: CPT | Performed by: SURGERY

## 2021-08-06 NOTE — PROGRESS NOTES
Simon Ramey  8/6/2021  [facility] office visit    Simon Ramey is a 66 y.o. male post open right inguinal hernia repair with mesh 7/123/21, álvarez catheter was left in the bladder and removed 7/25/21, he couldn't urinate and developed a urinary tract infection and was readmitted 7/26/21, treated with catheter and antibiotics and improved. Having some old blood leaking from the incision, moderate bruising down into the scrotum, no skin cellulitis. Now he is ready to have chemotherapy for the colon cancer. Weight: 195 lb (88.5 kg). Past Medical History:   Diagnosis Date    Arthritis     Cancer ([address]     colon    Depression     History of blood transfusion     [facility] [place]    Hyperlipidemia     Hypertension     Prolonged emergence from general anesthesia     Retention of urine      Past Surgical History:   Procedure Laterality Date    CHOLECYSTECTOMY, LAPAROSCOPIC N/A 10/27/2020    CHOLECYSTECTOMY LAPAROSCOPIC WITH IOC, ROBOT XI ASSISTED, POSS OPEN performed by Alexis Mullen MD at [address] [place] [address] E  11/2/15    bx, rectal mass    FRACTURE SURGERY      HERNIA REPAIR Right 7/13/2021    OPEN RIGHT INGUINAL HERNIA REPAIR WITH MESH (CPT 65319) performed by Alexis Mullen MD at [facility] ARTHROSCOPY Right     LIVER BIOPSY  11/12/15    SKIN FULL GRAFT      on finger    TONSILLECTOMY Bilateral     TUNNELED CENTRAL VENOUS CATHETER W/ SUBCUTANEOUS PORT Left        Home Medications  Prior to Visit Medications    Medication Sig Taking?  Authorizing Provider   hydrocortisone 2.5 % cream APPLY TO AFFECTED AREA, NOT TO FACE OR GENITAL AREAS, NOT TO EXCEED 4 WKS OF USE Yes Historical Provider, MD   Magic Mouthwash (MIRACLE MOUTHWASH) Swish and swallow 15 mLs 4 times daily as needed for Irritation Equal Amts of Benadryl,Maalox, and Xylocaine Yes Bobbi Rao APRN - CNP   ferrous gluconate (FERGON) 324 (38 Fe) MG tablet Take 324 mg by mouth daily (with breakfast) Yes Historical Provider, MD   traMADol (ULTRAM) 50 MG tablet TAKE 1 TABLET BY MOUTH EVERY 4 TO 6 HOURS AS NEEDED FOR PAIN Yes Historical Provider, MD   tamsulosin (FLOMAX) 0.4 MG capsule Take 2 capsules by mouth daily  Patient taking differently: Take 0.4 mg by mouth 2 times daily  Yes Colleen Medina MD   ondansetron (ZOFRAN) 4 MG tablet Take 1 tablet by mouth every 8 hours as needed for Nausea or Vomiting Yes RUBEN Mason - CNP   lactulose (CHRONULAC) 10 GM/15ML solution Take 20 g by mouth as needed  Yes Historical Provider, MD   furosemide (LASIX) 20 MG tablet Take 20 mg by mouth daily Indications: pt is taken this med 7 days a week for now  Yes Historical Provider, MD   magnesium oxide (MAG-OX) 400 (240 Mg) MG tablet Take 1 tablet by mouth 2 times daily  Patient taking differently: Take 400 mg by mouth daily  Yes Cem Green MD   potassium chloride (KLOR-CON M) 20 MEQ extended release tablet Take 40 mEq by mouth daily  Yes Historical Provider, MD   loratadine (CLARITIN) 10 MG tablet Take 10 mg by mouth See Admin Instructions Takes the week of chemotherapy (Last dose: 5/9; Next dose: 5/26) Yes Historical Provider, MD   pantoprazole (PROTONIX) 40 MG tablet Take 40 mg by mouth daily Yes Historical Provider, MD   b complex vitamins capsule Take 1 capsule by mouth daily Yes Historical Provider, MD   diphenoxylate-atropine (LOMOTIL) 2.5-0.025 MG per tablet Take 1 tablet by mouth 4 times daily as needed for Diarrhea. Yes Historical Provider, MD   polyethylene glycol (GLYCOLAX) packet Take 17 g by mouth daily as needed for Constipation Yes Historical Provider, MD   hydrochlorothiazide (HYDRODIURIL) 25 MG tablet Take 1 tablet by mouth daily Yes Paula Tapia Aas APRN - CNP   hydrocortisone (ANUSOL-HC) 2.5 % rectal cream Use 3-4 times daily. Do not use internally. External use only.  Yes RUBEN Avila - CNP   acetaminophen (TYLENOL) 500 MG tablet Take 500 mg by mouth every 6 hours as needed for Pain Yes Historical Provider, MD       Allergies: Neosporin [neomycin-polymyxin-gramicidin] and Tape [adhesive tape]     Physical Exam:   VITALS: Blood pressure 123/65, pulse 64, temperature 98 °F (36.7 °C), height 6' 1\" (1.854 m), weight 195 lb (88.5 kg), SpO2 98 %. General appearance: alert, appears stated age and cooperative, does ambulate easily  Head: Normocephalic, without obvious abnormality, atraumatic  Eyes: PERRL  Ears/mouth/throat:  Ears clear, mouth normal, throat no redness  Neck: no adenopathy, no JVD, supple, symmetrical, trachea midline and thyroid not enlarged  Lungs: clear to auscultation bilaterally  Heart: regular rate and rhythm  Abdomen: incision healing but leaking some old blood, no surrounding cellulitis, moderate skin bruising down through the scrotum  Extremities: 1 + leg edema  Skin: no open wounds    Assessment:    Some bruising and bleeding from the incision. Plan:   Keep the wound covered with dry gauze. Heating pad to help the bruising resolve. Ok to restart chemotherapy unless the wound keeps draining. Follow up 12 weeks.     Physician Signature: Electronically signed by Dr. Valerie Baez MD

## 2021-08-13 ENCOUNTER — HOSPITAL ENCOUNTER (OUTPATIENT)
Dept: CT IMAGING | Age: 71
Discharge: HOME | End: 2021-08-13
Payer: MEDICARE

## 2021-08-13 DIAGNOSIS — C20 RECTAL CANCER (HCC): ICD-10-CM

## 2021-08-13 PROCEDURE — 71260 CT THORAX DX C+: CPT

## 2021-08-13 PROCEDURE — 6360000004 HC RX CONTRAST MEDICATION: Performed by: RADIOLOGY

## 2021-08-13 PROCEDURE — 74177 CT ABD & PELVIS W/CONTRAST: CPT

## 2021-08-13 RX ADMIN — IOHEXOL 50 ML: 240 INJECTION, SOLUTION INTRATHECAL; INTRAVASCULAR; INTRAVENOUS; ORAL at 09:38

## 2021-08-13 RX ADMIN — IOPAMIDOL 75 ML: 755 INJECTION, SOLUTION INTRAVENOUS at 09:38

## 2021-08-16 ENCOUNTER — HOSPITAL ENCOUNTER (OUTPATIENT)
Age: 71
Discharge: HOME | End: 2021-08-16
Payer: MEDICARE

## 2021-08-16 DIAGNOSIS — C20 RECTAL CANCER METASTASIZED TO LIVER (HCC): ICD-10-CM

## 2021-08-16 DIAGNOSIS — C78.7 RECTAL CANCER METASTASIZED TO LIVER (HCC): ICD-10-CM

## 2021-08-16 LAB
ALBUMIN SERPL-MCNC: 3.3 G/DL (ref 3.5–5.2)
ALP BLD-CCNC: 152 U/L (ref 40–129)
ALT SERPL-CCNC: 14 U/L (ref 0–40)
ANION GAP SERPL CALCULATED.3IONS-SCNC: 8 MMOL/L (ref 7–16)
AST SERPL-CCNC: 27 U/L (ref 0–39)
BASOPHILS ABSOLUTE: 0 E9/L (ref 0–0.2)
BASOPHILS RELATIVE PERCENT: 0.3 % (ref 0–2)
BILIRUB SERPL-MCNC: 1 MG/DL (ref 0–1.2)
BUN BLDV-MCNC: 22 MG/DL (ref 6–23)
CALCIUM SERPL-MCNC: 8.9 MG/DL (ref 8.6–10.2)
CEA: 4 NG/ML (ref 0–5.2)
CHLORIDE BLD-SCNC: 105 MMOL/L (ref 98–107)
CO2: 23 MMOL/L (ref 22–29)
CREAT SERPL-MCNC: 1.1 MG/DL (ref 0.7–1.2)
EOSINOPHILS ABSOLUTE: 0.2 E9/L (ref 0.05–0.5)
EOSINOPHILS RELATIVE PERCENT: 7 % (ref 0–6)
GFR AFRICAN AMERICAN: >60
GFR NON-AFRICAN AMERICAN: >60 ML/MIN/1.73
GLUCOSE BLD-MCNC: 133 MG/DL (ref 74–99)
HCT VFR BLD CALC: 27.9 % (ref 37–54)
HEMOGLOBIN: 8.8 G/DL (ref 12.5–16.5)
LYMPHOCYTES ABSOLUTE: 0.38 E9/L (ref 1.5–4)
LYMPHOCYTES RELATIVE PERCENT: 13 % (ref 20–42)
MCH RBC QN AUTO: 30.1 PG (ref 26–35)
MCHC RBC AUTO-ENTMCNC: 31.5 % (ref 32–34.5)
MCV RBC AUTO: 95.5 FL (ref 80–99.9)
MONOCYTES ABSOLUTE: 0.12 E9/L (ref 0.1–0.95)
MONOCYTES RELATIVE PERCENT: 3.5 % (ref 2–12)
NEUTROPHILS ABSOLUTE: 2.23 E9/L (ref 1.8–7.3)
NEUTROPHILS RELATIVE PERCENT: 76.5 % (ref 43–80)
OVALOCYTES: ABNORMAL
PDW BLD-RTO: 15.4 FL (ref 11.5–15)
PLATELET # BLD: 72 E9/L (ref 130–450)
PLATELET CONFIRMATION: NORMAL
PMV BLD AUTO: 10.4 FL (ref 7–12)
POIKILOCYTES: ABNORMAL
POTASSIUM SERPL-SCNC: 4 MMOL/L (ref 3.5–5)
RBC # BLD: 2.92 E12/L (ref 3.8–5.8)
SCHISTOCYTES: ABNORMAL
SODIUM BLD-SCNC: 136 MMOL/L (ref 132–146)
TEAR DROP CELLS: ABNORMAL
TOTAL PROTEIN: 6.9 G/DL (ref 6.4–8.3)
WBC # BLD: 2.9 E9/L (ref 4.5–11.5)

## 2021-08-16 PROCEDURE — 80053 COMPREHEN METABOLIC PANEL: CPT

## 2021-08-16 PROCEDURE — 85025 COMPLETE CBC W/AUTO DIFF WBC: CPT

## 2021-08-16 PROCEDURE — 82378 CARCINOEMBRYONIC ANTIGEN: CPT

## 2021-08-16 PROCEDURE — 36415 COLL VENOUS BLD VENIPUNCTURE: CPT

## 2021-08-17 ENCOUNTER — HOSPITAL ENCOUNTER (OUTPATIENT)
Dept: INFUSION THERAPY | Age: 71
Discharge: HOME | End: 2021-08-17
Payer: MEDICARE

## 2021-08-17 ENCOUNTER — OFFICE VISIT (OUTPATIENT)
Dept: ONCOLOGY | Age: 71
End: 2021-08-17
Payer: MEDICARE

## 2021-08-17 VITALS
HEART RATE: 70 BPM | TEMPERATURE: 98.7 F | WEIGHT: 186.7 LBS | SYSTOLIC BLOOD PRESSURE: 123 MMHG | BODY MASS INDEX: 24.75 KG/M2 | RESPIRATION RATE: 18 BRPM | OXYGEN SATURATION: 99 % | DIASTOLIC BLOOD PRESSURE: 58 MMHG | HEIGHT: 73 IN

## 2021-08-17 DIAGNOSIS — C20 RECTAL CANCER (HCC): Primary | ICD-10-CM

## 2021-08-17 PROCEDURE — 99213 OFFICE O/P EST LOW 20 MIN: CPT

## 2021-08-17 PROCEDURE — 99214 OFFICE O/P EST MOD 30 MIN: CPT | Performed by: INTERNAL MEDICINE

## 2021-08-17 NOTE — PROGRESS NOTES
Mutation: Mutation not detected, wild type.     CEA 3488. Bone scan on 11/10/2015 noted no metastatic disease. CT chest on 11/10/2015 revealed Multiple pulmonary nodules in the upper and lower lobes consistent with metastatic disease;   Hepatic metastasis also visualized. 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 Mediport was placed. Cycle # 1 of FOLFOX + Avastin was on 11/23/2015. CEA was 4555 on 11/23/2015. Cycle # 2 of FOLFOX + Avastin was on 12/08/2015. CEA was 3160 on 12/08/2015. Cycle # 3 of FOLFOX + Avastin was on 12/22/2015. CEA was 2516 on 12/21/2015. Cycle # 4 of FOLFOX + Avastin was on 01/05/2016. CEA was 2098 on 01/05/2015. Cycle # 5 of FOLFOX + Avastin was on 01/19/2016. CEA was 1511 on 01/05/2015.     -Bone scan on 01/26/2016 noted no metastatic disease. CT chest on 01/26/2016 revealed Significant response to treatment with no visible residual nodules. CT scan abdomen/pelvis on 01/26/2016 noted Interval decreased size of multiple masses in the liver compatible with treatment response. Continue another 2 months of FOLFOX + Avastin and repeat scans. Cycle # 6 of FOLFOX + Avastin was on 02/02/2016.  on 02/02/2016. Cycle # 7 of FOLFOX + Avastin was on 02/16/2016.  on 02/16/2016. Cycle # 8 of FOLFOX + Avastin was on 03/01/2016.  on 03/01/2016. Cycle # 9 of FOLFOX + Avastin was on 03/15/2016.  on 03/15/2016. Cycle # 10 of FOLFOX + Avastin was on 03/29/2016. .4 on 03/29/2016. Cycle # 11 of FOLFOX + Avastin was on 04/12/2016. .4 on 04/12/2016.     Re-staging scans on 04/19/2016: CT Chest: clear lungs; no evidence of recurring pulmonary nodule; CT Abdomen/Pelvis: Further interval decrease in size of the multiple metastatic hepatic lesions, the largest lesion now measures 3.9 x 3.5 cm and previously measured 4.5 cm in maximum diameter.  No mesenteric lymphadenopathy is identified; Bone Scan: No evidence of osseous metastasis. Continue same regimen and re-stage in 2-3 months. Cycle # 12 FOLFOX + Avastin was on 04/26/2016. .5 on 04/26/2016. Cycle # 13 FOLFOX + Avastin was on 05/10/2016. .3 on 05/10/2016. Cycle # 14 FOLFOX+AVASTIN was on 05/24/2016. .5 on 05/24/2016. Cycle # 15 FOLFOX + Avastin was on 06/07/2016. CEA 90.5 on 06/07/2016. Admitted to [facility] 06/13/2016-06/16/2016 for abdominal pain: EGD noted 1.5 cm clean based duodenal bulb ulceration s/p epinephrine and bicap per Dr. Ravinder Vaughan. No active bleeding. A. Stomach, biopsy: Mild chronic gastritis, immunostain negative for Helicobacter  B. Esophagus, biopsy: Gastric glandular mucosa with prominent intestinal metaplasia (Madrid's epithelium), negative for epithelial dysplasia, esophageal squamous mucosa not identified  Cycle # 16 FOLFOX (discontinued avastin (bevacizumab) given association of peptic ulcer disease and known association of GI perforation) was on 06/21/2016. Cycle # 17 FOLFOX was on 07/05/2016. CEA 52.6 on 07/19/2016. Cycle # 17 FOLFOX was on 07/05/2016. CEA 52.6 on 07/05/2016. CEA 47.6 on 07/19/2016.     Re-staging scans 07/19/2106: CT Chest negative for metastatic disease. CT Abdomen/Pelvis: Stable hepatic lesions; Question new mural thickening in the cecum. Bone Scan: New Let hip lesion suspicious for bone metastasis.     Increased CEA; new mural thickening in the cecum and new left hip lesion suspicious for bone metastasis are consistent with disease progression; He derived maximum benefit from FOLFOX/AVASTIN. D/C FOLFOX/AVASTIN. We recommended FOLFIRI second line therapy. Cycle # 1 FOLFIRI was on 08/02/2016. CEA 40.8 on 08/02/2016. Xgeva q4 weeks started on 08/02/2016. Cycle # 2 FOLFIRI was on 08/16/2016. CEA 31.7 on 08/16/2016. Cycle # 3 FOLFIRI (added Avastin) was on 08/30/2016 given that ulcers healed on EGD 08/15/2016 by Dr. Cody Gonzalez; Protonix bid since avastin re-started.    Colonoscopy on 08/29/2016 (to joint rib fracture. Continue FOLFIRI + Avastin and repeat scans in 3 months. Cycle # 13 FOLFIRI + Avastin was on 01/24/2017. Cycle # 14 FOLFIRI + Avastin was on 02/07/2017. Cycle # 15 FOLFIRI + Avastin was on 02/21/2017. Cycle # 16 FOLFIRI + Avastin was on 03/07/2017. Cycle # 17 FOLFIRI + Avastin was on 03/21/2017. Cycle # 18 FOLFIRI + Avastin was on 04/04/2017. CT chest 04/17/2017 negative for metastatic disease. CT abdomen/pelvis 04/17/2017 Stable hypodense metastatic lesions within the liver. Stable area of increased sclerosis along the right acetabulum  Bone scan 04/17/2017 Stable subtle uptake within the left proximal femoral diaphysis; No definite abnormal uptake in the region of a sclerotic lesion along the posterior column of the right acetabulum noted on CT. Stable slight uptake within the left anterior sixth rib corresponding to linear sclerosis on the recent CT, favored to be related to a fracture. Continue FOLFIRI + Avastin and repeat scans in 3 months. Cycle # 19 FOLFIRI + Avastin was on 04/18/2017. Cycle # 20 FOLFIRI + Avastin was on 05/02/2017. Cycle # 21 FOLFIRI + Avastin was on 05/16/2017. Cycle # 22 FOLFIRI + Avastin was on 05/30/2017. Cycle # 23 FOLFIRI + Avastin was on 06/13/2017. Cycle # 24 FOLFIRI + Avastin was on 06/27/2017. Cycle # 25 FOLFIRI + Avastin was on 07/11/2017. Cycle # 26 FOLFIRI + Avastin was on 07/25/2017. Cycle # 27 FOLFIRI + Avastin was on 08/08/2017. Cycle # 28 FOLFIRI + Avastin was on 08/22/2017. Cycle # 29 FOLFIRI + Avastin was on 09/05/2017. Cycle # 30 FOLFIRI + Avastin was on 09/19/2017. Bone scan 09/26/2017 stable. CT abdomen/pelvis on 09/26/2017 Stable hypodense mass in the liver. Stable sclerotic lesion in the acetabulum. CT chest 09/26/2017 negative for metastatic disease. Cycle # 31 FOLFIRI + Avastin was on 10/03/2017. CEA 7.4 on 10/03/2017. Cycle # 32 FOLFIRI + Avastin was on 10/17/2017. CEA 6.0 on 10/17/2017.   Cycle # 33 FOLFIRI + Avastin was on 10/31/2017. CEA 6.8 on 10/31/2017. Cycle # 34 FOLFIRI + Avastin was on 11/14/2017. CEA 7.2 on 11/14/2017. Cycle # 35 FOLFIRI + Avastin was on 11/28/2017. CEA 5.1 on 11/28/2017. Cycle # 36 FOLFIRI + Avastin was on 12/12/2017. CEA 6.1 on 12/12/2017. Bone scan 12/22/2017 noted no evidence of metastatic disease to the axial or appendicular skeleton. CT chest 12/22/2017 noted no evidence of metastatic disease. CT abdomen/pelvis 12/22/2017 noted stable hypodense lesions in the liver. Continue FOLFIRI + Avastin and repeat scans in 3 months. Cycle # 37 FOLFIRI + Avastin was on 12/27/2018. Cycle # 38 FOLFIRI + Avastin was on 01/09/2018. Cycle # 39 FOLFIRI + Avastin was on 01/23/2018. Cycle # 40 FOLFIRI + Avastin was on 02/06/2018. Cycle # 41 FOLFIRI + Avastin was on 02/20/2018. Cycle # 42 FOLFIRI + Avastin was on 03/06/2018. Cycle # 43 FOLFIRI + Avastin was on 03/20/2018. Bone scan on 03/26/2018 negative for metastatic disease. CT chest 03/26/2018 noted ? new 7 mm nodule in LUCY. CT abdomen/pelvis 03/26/2018 noted stable hypodense lesions in the liver. ? New small ascites in the abdomen. Patient wants to continue to monitor the lung finding and repeat scans in 2 months instead of changing the current regimen into oral Lonsurf. Cycle # 44 FOLFIRI + Avastin was on 04/03/2018. CEA 6.3 on 04/03/2018. Cycle # 45 FOLFIRI + Avastin was on 04/24/2018. CEA 5.3 on 04/24/2018. Cycle # 46 FOLFIRI + Avastin was on 05/08/2018. CEA 5.4 on 05/08/2018. Cycle # 47 FOLFIRI + Avastin was on 05/22/2018. CEA 6.1 on 05/22/2018. CT chest 05/31/2018 noted stable nodule in LUCY. No evidence of worsening malignancy. CT abdomen/pelvis on 05/31/2018 noted stable liver metastasis. No evidence of worsening malignancy. Continue FOLFIRI + Avastin and repeat scans in 3 months. Cycle # 48 FOLFIRI + Avastin was on 06/06/2018. Cycle # 49 FOLFIRI + Avastin was on 06/19/2018.    Cycle # 50 FOLFIRI + Avastin was on 07/03/2018. Cycle # 51 FOLFIRI + Avastin was on 07/24/2018. Cycle # 52 FOLFIRI + Avastin was on 08/14/2018. Cycle # 53 FOLFIRI + Avastin was on 08/28/2018. CT chest 09/06/2018 noted interval decrease in size of LUCY measuring up to 4 mm, suggestive of treatment response. No mediastinal or hilar LN  CT abdomen/pelvis 09/06/2018 Slight interval increase in size of a previously noted metastatic lesion within the right hepatic lobe. This may be related to differences in phase of enhancement compared to the most recent study from 5/31/2018, the lesion remains decreased in size compared to the more previous studies. No abdominal, retroperitoneal, or pelvic lymphadenopathy. Continue FOLFIRI + Avastin and repeat scans in 2 months. Cycle # 54 FOLFIRI + Avastin was on 09/11/2018. Cycle # 55 FOLFIRI + Avastin was on 09/25/2018. Cycle # 56 FOLFIRI + Avastin was on 10/09/2018. Cycle # 57 FOLFIRI + Avastin was on 10/23/2018. CT chest 11/02/2018 stable 3 mm nodule within LUCY. No new right and left lung nodule. No mediastinal or osseous lesion. CT abdomen/pelvis 11/02/2018 stable metastatic disease to liver. No new metastatic disease identified. No mesenteric or retroperitoneal LN. No gross colonic lesion identified. Continue FOLFIRI + Avastin and repeat scans in 3 months. Cycle # 58 FOLFIRI + Avastin was on 11/06/2018. CEA 7.6 on 11/05/2018. Cycle # 59 FOLFIRI + Avastin was on 11/27/2018. CEA 6.9 on 11/26/2018. Cycle # 60 FOLFIRI + Avastin was on 12/11/2018. CEA 6.7 on 12/11/2018. Cycle # 61 FOLFIRI + Avastin was on 01/08/2019. CEA 5.6 on 01/07/2019. Cycle # 62 FOLFIRI + Avastin was on 01/29/2019. CEA 4.6 on 01/28/2019. Cycle # 63 FOLFIRI + Avastin was on 02/12/2019. CEA 4.3 on 02/11/2019. CT chest 02/21/2019 no evidence of metastatic disease. CT abdomen/pelvis 02/21/2019 stable hypodense liver lesions. No evidence of worsening metastatic disease.  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 Ordered MRI thoracic spine and referred to neurosurgery team.    Cycle # 64 FOLFIRI + Avastin was on 02/26/2019. CEA 4.7 on 02/25/2019. Cycle # 65 FOLFIRI + Avastin was on 03/12/2019. CEA 4.0 on 03/11/2019. Cycle # 66 FOLFIRI + Avastin was on 03/26/2019. CEA 4.7 on 03/25/2019. Cycle # 67 FOLFIRI + Avastin was on 04/09/2019. CEA 5.6 on 04/08/2019. Cycle # 68 FOLFIRI + Avastin was on 04/30/2019. CEA 4.9 on 04/29/2019. Cycle # 69 FOLFIRI + Avastin was on 05/14/2019. CEA 5.3 on 05/14/2019. CT chest 05/23/2019 stable small lung nodule with no evidence of metastatic disease. CT abdomen/pelvis 05/23/2019 Decrease conspicuity of the liver lesions. No new lesions seen. Stable splenomegaly. Continue FOLFIRI + Avastin and repeat scans in 3 months. Cycle # 70 FOLFIRI + Avastin was on 06/04/2019. CEA 4.8 on 06/03/2019. Cycle # 71 FOLFIRI + Avastin was on 06/18/2019. CEA 4.6 on 06/17/2019. Cycle # 72 FOLFIRI + Avastin was on 07/09/2019. CEA 4.3 on 07/08/2019. Cycle # 73 FOLFIRI + Avastin was on 07/30/2019. CEA 5.0 on 07/29/2019. Cycle # 74 FOLFIRI + Avastin was on 08/13/2019. CEA 5.0 on 08/12/2019. CT chest 08/20/2019 negative  CT abdomen/pelvis 08/20/2019 Previous identified hepatic lesions are not visualized on the current exam.  Continue FOLFIRI + Avastin and repeat scans in 3 months. Cycle # 75 FOLFIRI + Avastin was on 08/27/2019. CEA 5.0 on 08/26/2019. Cycle # 76 FOLFIRI + Avastin was on 09/17/2019. CEA 5.5 on 09/16/2019. Cycle # 77 FOLFIRI + Avastin was on 10/15/2019. CEA 4.6 on 10/15/2019. Cycle # 78 FOLFIRI + Avastin was on 10/29/2019. CEA 4.1 on 10/28/2019. Cycle # 79 FOLFIRI + Avastin was on 11/12/2019. CEA 4.3 on 11/12/2019. Cycle # 80 FOLFIRI + Avastin was on 12/10/2019. CEA 4.0 on 12/09/2019.   CT chest 12/19/2019 Stable 3 mm nodule within the left upper lobe, similar to the previous studies dating back to 11/2/2018, however decreased in size Cycle # 93 FOLFIRI + Avastin was on 07/21/2020. CEA 5.9 on 07/20/2020. Cycle # 94 FOLFIRI + Avastin was on 08/04/2020. CEA 5.5 on 08/04/2020. Cycle # 95 FOLFIRI + Avastin was on 08/25/2020. CEA 6.1 on 08/24/2020. CT chest 9/2/2020 stable unremarkable enhanced CT of the thorax. There is no metastatic disease to the lungs. CT abdomen pelvis on 9/2/2020 stable 2.2 cm low attenuated lesion within the central aspect of the right hepatic lobe favored to represent treated metastatic disease. No new hepatic lesion is identified. Stable splenomegaly  There is no appreciable colonic lesion or stricture  Pericholecystic fluid of uncertain etiology. General surgery team consulted. Laparoscopic cholecystectomy with normal cholangiogram 10/27/20  Gallbladder: Chronic cholecystitis and cholelithiasis.  Unremarkable regional lymph node. 11/13/2020; Seen by Dr. Kane Martines from General surgery team; cleared to re-start chemotherapy. Cycle # 96 FOLFIRI + Avastin was on 11/17/2020. CEA 3.6 on 11/16/2020. Cycle # 97 FOLFIRI + Avastin was on 12/01/2020. CEA 3.5 on 11/30/2020. Cycle # 98 FOLFIRI + Avastin was on 12/15/2020. CEA 4.3 on 12/15/2020. Cycle # 99 FOLFIRI + Avastin was on 01/05/2021. CEA 4.7 on 01/04/2021. CT chest 01/13/2021 negative for metastatic disease. CT abdomen/pelvis 01/13/2021 Unchanged central hepatic lesion. No specific signs of abdominopelvic metastatic disease. Continue FOLFIRI + Avastin and repeat scans in 3 months. Cycle # 100 FOLFIRI + Avastin was on 01/19/2021. CEA 4.7 on 01/18/2021. Cycle # 101 FOLFIRI + Avastin was on 02/02/2021. CEA 5.2 on 02/01/2021. Cycle # 102 FOLFIRI + Avastin was on 02/16/2021. CEA 5.6 on 02/15/2021. Cycle # 103 FOLFIRI + Avastin was on 03/02/2021. Cycle # 104 FOLFIRI (20% dose reduced due to significant toxicity) + Avastin was on 03/16/2021. Cycle # 105 FOLFIRI (same dose as cycle # 104) + Avastin was on 03/30/2021. CEA 6.5 on 03/29/2021.   Cycle # 106 FOLFIRI (same dose as cycle # 104) + Avastin was on 04/13/2021. CEA 6.0 on 04/12/2021  CT chest 04/20/2021 no evidence of metastatic disease. CT abdomen/pelvis 04/20/2021 No evidence of progressive metastatic disease. Stable 2 cm lesion in the right lobe of the liver, likely representing treated metastatic disease. Imaging reviewed. Continue FOLFIRI + Avastin and repeat scans in 3 months  Cycle # 107 FOLFIRI + Avastin was on 04/27/2021  Cycle # 108 FOLFIRI + Avastin was on 05/11/2021. Cycle # 109 FOLFIRI (held Avastin due to upcoming surgery) on 05/25/2021. CEA 6.3 on 5/24/21  Cycle # 110 FOLFIRI (held Avastin due to upcoming surgery) on 06/08/2021. CEA 6.3 on 6/07/21. Right inguinal/scrotol hernia repair on 07/13/2021 with Dr Franny Zelaya with folely removal on Friday 07/23/2021. He developed fever on 07/24/2021 and was brought to [address] [place], [place] [address] ER via EMS; Sepsis secondary to urinary tract infection   Completed Abx  Today 08/17/2021 for f/u. No fever, chills. No nausea/vomiting. Fair appetite. Review of Systems;  CONSTITUTIONAL: No fever, chills. Fair appetite and energy level  ENMT: Eyes: No diplopia; Nose: No epistaxis. Mouth:No lesions  RESPIRATORY: No hemoptysis, shortness of breath. CARDIOVASCULAR: No chest pain, palpitations. GASTROINTESTINAL:No N/V. Recent hernia surgery by Dr. Editha Rinne: No dysuria, urinary frequency, hematuria. NEURO: No syncope, presyncope, headache. Remainder ROS: Negative. Past Medical History:   Past Medical History               Diagnosis Date    Arthritis      Cancer ([address]         colon    Depression      Hyperlipidemia      Hypertension           Medications:  Reviewed and reconciled.     Allergies:        Allergies   Allergen Reactions    Neosporin [Neomycin-Polymyxin-Gramicidin] Rash    Tape Abilio Cecilio Tape] Rash      Physical Exam:  BP (!) 123/58   Pulse 70   Temp 98.7 °F (37.1 °C)   Resp 18   Ht 6' 1\" (1.854 m)   Wt 186 lb 11.2 oz (84.7 kg) SpO2 99%   BMI 24.63 kg/m²   GENERAL: Alert, oriented x 3, not in distress  HEENT: PERRLA, EOMI. NECK: Supple. Without lymphadenopathy. LUNGS: Lungs are CTA bilaterally, with no wheezing, crackles or rhonchi. CARDIOVASCULAR: Regular rhythm. No murmurs, rubs or gallops. ABDOMEN: Soft. Non-tender, non-distended. Positive bowel sounds. hernia  EXTREMITIES: Without clubbing, cyanosis or edema. NEUROLOGIC: No focal deficits. ECOG PS 1    Diagnostics:  Lab Results   Component Value Date    WBC 2.9 (L) 08/16/2021    HGB 8.8 (L) 08/16/2021    HCT 27.9 (L) 08/16/2021    MCV 95.5 08/16/2021    PLT 72 (L) 08/16/2021     Lab Results   Component Value Date     08/16/2021    K 4.0 08/16/2021     08/16/2021    CO2 23 08/16/2021    BUN 22 08/16/2021    CREATININE 1.1 08/16/2021    GLUCOSE 133 (H) 08/16/2021    CALCIUM 8.9 08/16/2021    PROT 6.9 08/16/2021    LABALBU 3.3 (L) 08/16/2021    BILITOT 1.0 08/16/2021    ALKPHOS 152 (H) 08/16/2021    AST 27 08/16/2021    ALT 14 08/16/2021    LABGLOM >60 08/16/2021    GFRAA >60 08/16/2021     Lab Results   Component Value Date    CEA 4.0 08/16/2021     Impression/Plan:  67 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 KRAS Mutation: Mutation detected. BRAF Mutation: Mutation not detected. 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 CT scan abdomen/pelvis on 10/26/2015 revealed small nodules at the lung bases, extensive liver lesions consistent with metastatic rectosigmoid cancer. CEA 3488 on 10/30/2015. Bone scan on 11/10/2015 noted no metastatic disease. CT chest on 11/10/2015 revealed Multiple pulmonary nodules in the upper and lower lobes consistent with metastatic disease; Hepatic metastasis also visualized. 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 Mediport was placed. Cycle # 1 of FOLFOX + Avastin was on 11/23/2015. CEA was 4555 on 11/23/2015.   Cycle # 2 of FOLFOX + Avastin was on 12/08/2015. CEA was 3160 on 12/08/2015. Cycle # 3 of FOLFOX + Avastin was on 12/22/2015. CEA was 2516 on 12/21/2015. Cycle # 4 of FOLFOX + Avastin was on 01/05/2016. CEA was 2098 on 01/05/2015. Cycle # 5 of FOLFOX + Avastin was on 01/19/2016. CEA was 1511 on 01/05/2015.     -Bone scan on 01/26/2016 noted no metastatic disease. CT chest on 01/26/2016 revealed Significant response to treatment with no visible residual nodules. CT scan abdomen/pelvis on 01/26/2016 noted Interval decreased size of multiple masses in the liver compatible with treatment response. Continue another 2 months of FOLFOX + Avastin and repeat scans. Cycle # 6 of FOLFOX + Avastin was on 02/02/2016.  on 02/02/2016. Cycle # 7 of FOLFOX + Avastin was on 02/16/2016.  on 02/16/2016. Cycle # 8 of FOLFOX + Avastin was on 03/01/2016.  on 03/01/2016. Cycle # 9 of FOLFOX + Avastin was on 03/15/2016.  on 03/15/2016. Cycle # 10 of FOLFOX + Avastin was on 03/29/2016. .4 on 03/29/2016. Cycle # 11 of FOLFOX + Avastin was on 04/12/2016. .4 on 04/12/2016.     Re-staging scans on 04/19/2016: CT Chest: clear lungs; no evidence of recurring pulmonary nodule; CT Abdomen/Pelvis: Further interval decrease in size of the multiple metastatic hepatic lesions, the largest lesion now measures 3.9 x 3.5 cm and previously measured 4.5 cm in maximum diameter. No mesenteric lymphadenopathy is identified; Bone Scan: No evidence of osseous metastasis. Continue same regimen and re-stage in 2-3 months. Cycle # 12 FOLFOX + Avastin was on 04/26/2016. .5 on 04/26/2016. Cycle # 13 FOLFOX + Avastin was on 05/10/2016. .3 on 05/10/2016. Cycle # 14 FOLFOX+AVASTIN was on 05/24/2016. .5 on 05/24/2016. Cycle # 15 FOLFOX + Avastin was on 06/07/2016. CEA 90.5 on 06/07/2016.    Admitted to [facility] 06/13/2016-06/16/2016 for abdominal pain: EGD noted 1.5 cm clean based duodenal bulb ulceration s/p epinephrine and bicap per Dr. Tommy Pa. No active bleeding. A. Stomach, biopsy: Mild chronic gastritis, immunostain negative for Helicobacter  B. Esophagus, biopsy: Gastric glandular mucosa with prominent ntestinal metaplasia (Mad[place]'s epithelium), negative for epithelial dysplasia, esophageal squamous mucosa not identified     Cycle # 16 FOLFOX (discontinued avastin (bevacizumab) given association of peptic ulcer disease and known association of GI perforation.) was on 06/21/2016. CEA 47 on 06/21/2016. Cycle # 17 FOLFOX was on 07/05/2016. CEA 52.6 on 07/05/2016. CEA 47.6 on 07/19/2016.     Re-staging scans 07/19/2106: CT Chest negative for metastatic disease. CT Abdomen/Pelvis: Stable hepatic lesions; Question new mural thickening in the cecum. Bone Scan: New Let hip lesion suspicious for bone metastasis.     Increased CEA; new mural thickening in the cecum and new left hip lesion suspicious for bone metastasis are consistent with disease progression; He derived maximum benefit from FOLFOX/AVASTIN. D/C FOLFOX/AVASTIN. We recommended FOLFIRI second line therapy. Cycle # 1 FOLFIRI was on 08/02/2016. CEA 40.8 on 08/02/2016. Xgeva q4 weeks started on 08/02/2016. Cycle # 2 FOLFIRI was on 08/16/2016. CEA 31.7 on 08/16/2016. Cycle # 3 FOLFIRI (added Avastin) was on 08/30/2016 given that ulcers healed on EGD 08/15/2016 by Dr. Kaden Warren; Protonix bid since avastin re-started. Colonoscopy on 08/29/2016 (to look at the cecal area) unremarkable. CEA 26.7 on 08/30/2016. Cycle # 4 FOLFIRI/Avastin was on 09/13/2016. CEA 23.4 on 09/13/2016. Cycle # 5 FOLFIRI/Avastin was on 09/27/2016. CEA 22.3 on 09/27/2016. Cycle # 6 FOLFIRI/Avastin was on 10/11/2016. CEA 18.4 on 10/11/2016.      Bone scan 10/21/2016 stable bone metastasis; ? New lesion anterior left sixth rib likely interval healing fracture. CT abdomen/pelvis revealed stable hepatic metastasis. CT chest negative for metastatic disease.  Continue FOLFIRI/Avastin and repeat scans in 3 months. Cycle # 7 FOLFIRI/Avastin was on 10/25/2016. CEA 16.1 on 10/25/2016. Cycle # 8 FOLFIRI/Avastin was on 11/08/2016. CEA 15.7 on 11/08/2016. Cycle # 9 FOLFIRI/Avastin was on 11/29/2016. CEA 13.6 on 11/29/2016. Cycle # 10 FOLFIRI/Avastin was on 12/13/2016. CEA 10.6 on 12/13/2016. Cycle # 11 FOLFIRI/Avastin was on 12/27/2016. CEA 11.1 on 12/27/2016. Cycle # 12 FOLFIRI/Avastin was on 01/10/2017. CEA 9.7 on 01/10/2017. CT chest 01/23/2017 No new pulmonary nodule or lymphadenopathy. Patchy groundglass opacities within the left lower lobe, suggestive of infectious or inflammatory etiology. Followup CT chest in 3 months. Slight increased linear sclerosis along the left anterior sixth rib corresponding to an area of increased uptake on the prior bone scan from 10/21/2016, favored to be related to interval healing changes of a nondisplaced fracture. CT abdomen/pelvis on 01/23/2017 Redemonstration of multiple hepatic metastases, which are similar compared to the prior CT from 10/21/2016. Redemonstration of area of increased sclerosis along the right acetabulum suspicious for metastatic disease. Bone scan on 01/23/2017 Stable subtle uptake within the left proximal diaphysis, again suggestive of metastatic disease  Decreased subtle uptake within the medial right acetabulum. Decreased uptake within the left anterior sixth rib corresponding to linear sclerosis on the recent CT, favored to be related to an joint rib fracture. Continue FOLFIRI + Avastin and repeat scans in 3 months. Cycle # 13 FOLFIRI + Avastin was on 01/24/2017. Cycle # 14 FOLFIRI + Avastin was on 02/07/2017. Cycle # 15 FOLFIRI + Avastin was on 02/21/2017. Cycle # 16 FOLFIRI + Avastin was on 03/07/2017. Cycle # 17 FOLFIRI + Avastin was on 03/21/2017. Cycle # 18 FOLFIRI + Avastin was on 04/04/2017. CT chest 04/17/2017 negative for metastatic disease.  CT abdomen/pelvis 04/17/2017 Stable hypodense metastatic lesions within the liver. Stable area of increased sclerosis along the right acetabulum  Bone scan 04/17/2017 Stable subtle uptake within the left proximal femoral diaphysis; No definite abnormal uptake in the region of a sclerotic lesion along the posterior column of the right acetabulum noted on CT. Stable slight uptake within the left anterior sixth rib corresponding to linear sclerosis on the recent CT, favored to be related to a fracture. Continue FOLFIRI + Avastin and repeat scans in 3 months. Cycle # 19 FOLFIRI + Avastin was on 04/18/2017. CEA 7.5 on 04/18/2017. Cycle # 20 FOLFIRI + Avastin was on 05/02/2017. CEA 7.7 on 05/02/2017. Cycle # 21 FOLFIRI + Avastin was on 05/16/2017. CEA 7.1 on 05/16/2017. Cycle # 22 FOLFIRI + Avastin was on 05/30/2017. CEA 8.2 on 05/30/2017. Cycle # 23 FOLFIRI + Avastin was on 06/13/2017. CEA 7.7 on 06/13/2017. Cycle # 24 FOLFIRI + Avastin was on 06/27/2017. CEA 6.6 on 06/27/2017. Re-staging scans 07/05/2017:  Bone scan stable proximal left femoral diaphysis, right acetabulum, and left anterior sixth rib lesions;  CT abdomen/pelvis stable hypodense metastatic lesions within the liver; CT chest without convincing evidence of metastatic disease. Cycle # 25 FOLFIRI + Avastin was on 07/11/2017. CEA 6.3 on 07/11/2017. Cycle # 26 FOLFIRI + Avastin was on 07/25/2017. CEA 7.3 on 07/25/2017. Cycle # 27 FOLFIRI + Avastin was on 08/08/2017. CEA 6.5 on 08/08/2017. Cycle # 28 FOLFIRI + Avastin was on 08/22/2017. CEA 5.9 on 08/22/2017. Cycle # 29 FOLFIRI + Avastin was on 09/05/2017. CEA 6.3 on 09/05/2017. Cycle # 30 FOLFIRI + Avastin was on 09/19/2017. CEA 6.3 on 09/19/2017. Bone scan 09/26/2017 stable. CT abdomen/pelvis on 09/26/2017 Stable hypodense mass in the liver. Stable sclerotic lesion in the acetabulum. CT chest 09/26/2017 negative for metastatic disease. Cycle # 31 FOLFIRI + Avastin was on 10/03/2017. CEA 7.4 on 10/03/2017.   Cycle # 32 FOLFIRI + metastasis. No evidence of worsening malignancy. Continue FOLFIRI + Avastin and repeat scans in 3 months. Cycle # 48 FOLFIRI + Avastin was on 06/06/2018. CEA 6.3 on 06/05/2018. Cycle # 49 FOLFIRI + Avastin was on 06/19/2018. CEA 6.1 on 06/19/2018. Cycle # 50 FOLFIRI + Avastin was on 07/03/2018. CEA 7.4 on 07/03/2018. Cycle # 51 FOLFIRI + Avastin was on 07/24/2018. CEA 6.7 on 07/24/2018. Cycle # 52 FOLFIRI + Avastin was on 08/14/2018. CEA 6.8 on 08/14/2018. Cycle # 53 FOLFIRI + Avastin was on 08/28/2018. CEA 6.5 on 08/27/2018. CT chest 09/06/2018 noted interval decrease in size of LUCY measuring up to 4 mm, suggestive of treatment response. No mediastinal or hilar LN  CT abdomen/pelvis 09/06/2018 Slight interval increase in size of a previously noted metastatic lesion within the right hepatic lobe. This may be related to differences in phase of enhancement compared to the most recent study from 5/31/2018, the lesion remains decreased in size compared to the more previous studies. No abdominal, retroperitoneal, or pelvic lymphadenopathy. Continue FOLFIRI + Avastin and repeat scans in 2 months. Cycle # 54 FOLFIRI + Avastin was on 09/11/2018. CEA 6.4 on 09/10/2018. Cycle # 55 FOLFIRI + Avastin was on 09/25/2018. CEA 6.4 on 09/25/2018. Cycle # 56 FOLFIRI + Avastin was on 10/09/2018. CEA 6.7 on 10/08/2018. Cycle # 57 FOLFIRI + Avastin was on 10/23/2018. CEA 7.2 on 10/22/2018. CT chest 11/02/2018 stable 3 mm nodule within LUCY. No new right and left lung nodule. No mediastinal or osseous lesion. CT abdomen/pelvis 11/02/2018 stable metastatic disease to liver. No new metastatic disease identified. No mesenteric or retroperitoneal LN. No gross colonic lesion identified. Continue FOLFIRI + Avastin and repeat scans in 3 months. Cycle # 58 FOLFIRI + Avastin was on 11/06/2018. CEA 7.6 on 11/05/2018. Cycle # 59 FOLFIRI + Avastin was on 11/27/2018. CEA 6.9 on 11/26/2018.    Cycle # 60 FOLFIRI + Avastin 09/16/2019. Cycle # 77 FOLFIRI + Avastin was on 10/15/2019. CEA 4.6 on 10/15/2019. Cycle # 78 FOLFIRI + Avastin was on 10/29/2019. CEA 4.1 on 10/28/2019. Cycle # 79 FOLFIRI + Avastin was on 11/12/2019. CEA 4.3 on 11/12/2019. Cycle # 80 FOLFIRI + Avastin was on 12/10/2019. CEA 4.0 on 12/09/2019. CT chest 12/19/2019 Stable 3 mm nodule within the left upper lobe, similar to the previous studies dating back to 11/2/2018, however decreased in size compared to the CT from 3/26/2018. No thoracic LN. CT abdomen/pelvis 12/19/2019 Previously described small hypodense lesions are more conspicuous on the current study compared to the recent study throughout the liver from 8/20/2019, however similar to the prior study from 2/21/2019. Findings may be related to differences in contrast opacification. No abdominal or pelvic lymphadenopathy. Continue FOLFIRI + Avastin and repeat scans in 2 months to f/u on liver lesions. Cycle # 81 FOLFIRI + Avastin was on 01/07/2020. CEA 3.8 on 01/06/2020. Cycle # 82 FOLFIRI + Avastin was on 01/21/2020. CEA 3.7 on 01/20/2020. Cycle # 83 FOLFIRI + Avastin was on 02/04/2020. CEA 4.1 on 02/03/2020. Cycle # 84 FOLFIRI + Avastin was on 02/18/2020. CEA 4.6 on 02/17/2020. CT chest 2/28/2020 no evidence of metastatic disease to the lungs and no mediastinal or hilar adenopathy. CT abdomen pelvis 2/28/2020 no enhancing lesions seen within the liver to suggest metastatic disease. Wall thickening of the rectosigmoid junction. Images reviewed. Continue FOLFIRI Avastin and repeat scans in 3 months  EGD by Dr. Cody Gonzalez 03/02/2020 showing no masses or lesions. Cycle # 85 FOLFIRI + Avastin was on 03/03/2020. CEA 7.4 on 03/02/2020. Cycle # 86 FOLFIRI + Avastin was on 03/17/2020. CEA 4.5 on 03/16/2020. Colonoscopy on 03/23/2020 by Dr. Cody aguero (records requested). Cycle # 87 FOLFIRI + Avastin was on 03/31/2020. CEA 4.1 on 03/30/2020.   Cycle # 88 FOLFIRI + Avastin was on 04/21/2020. CEA 6.4 on 04/20/2020. Cycle # 89 FOLFIRI + Avastin was on 05/05/2020. CEA 5.8 on 05/04/2020. Cycle # 90 FOLFIRI + Avastin was on 06/02/2020. CEA 5.5 on 06/01/2020. Cycle # 91 FOLFIRI + Avastin was on 06/16/2020. CEA 5.6 on 06/15/2020. CT chest 06/23/2020 noted no metastatic disease in the chest.   CT abdomen/pelvis 06/23/2020 Stable small liver lesions measuring up to 5 mm since 2019.   22 mm round mass in segment 8 of the liver with central high density stable from December 2019), previously measuring up to 34 mm in 2017. No additional metastatic disease in the abdomen or pelvis. Small amount of ascites. Overall stable disease. Continue FOLFIRI + Avastin and repeat scans in 2-3 months. Cycle # 92 FOLFIRI + Avastin was on 07/07/2020. CEA 5.7 on 07/06/2020. Cycle # 93 FOLFIRI + Avastin was on 07/21/2020. CEA 5.9 on 07/20/2020. Cycle # 94 FOLFIRI + Avastin was on 08/04/2020. CEA 5.5 on 08/04/2020. Cycle # 95 FOLFIRI + Avastin was on 08/25/2020. CEA 6.1 on 08/24/2020. CT chest 9/2/2020 stable unremarkable enhanced CT of the thorax. There is no metastatic disease to the lungs. CT abdomen pelvis on 9/2/2020 stable 2.2 cm low attenuated lesion within the central aspect of the right hepatic lobe favored to represent treated metastatic disease. No new hepatic lesion is identified. Stable splenomegaly  There is no appreciable colonic lesion or stricture. Pericholecystic fluid of uncertain etiology. Laparoscopic cholecystectomy with normal cholangiogram 10/27/20  Gallbladder: Chronic cholecystitis and cholelithiasis.  Unremarkable regional lymph node. 11/13/2020; Seen by Dr. Kane Martines from General surgery team; cleared to re-start chemotherapy. Cycle # 96 FOLFIRI + Avastin was on 11/17/2020. CEA 3.6 on 11/16/2020. Cycle # 97 FOLFIRI + Avastin was on 12/01/2020. CEA 3.5 on 11/30/2020. Cycle # 98 FOLFIRI + Avastin was on 12/15/2020. CEA 4.3 on 12/15/2020.   Cycle # 99 FOLFIRI + Avastin was on 01/05/2021. CEA 4.7 on 01/04/2021. CT chest 01/13/2021 negative for metastatic disease. CT abdomen/pelvis 01/13/2021 Unchanged central hepatic lesion. No specific signs of abdominopelvic metastatic disease. Continue FOLFIRI + Avastin and repeat scans in 3 months. Cycle # 100 FOLFIRI + Avastin was on 01/19/2021. CEA 4.7 on 01/18/2021. Cycle # 101 FOLFIRI + Avastin was on 02/02/2021. CEA 5.2 on 02/01/2021. Cycle # 102 FOLFIRI + Avastin was on 02/16/2021. CEA 5.6 on 02/15/2021. Cycle # 103 FOLFIRI + Avastin was on 03/02/2021. Cycle # 104 FOLFIRI (20% dose reduced due to significant toxicity) + Avastin was on 03/16/2021. Cycle # 105 FOLFIRI (same dose as cycle # 104) + Avastin was on 03/30/2021. CEA 6.5 on 03/29/2021. Cycle # 106 FOLFIRI (same dose as cycle # 104) + Avastin was on 04/13/2021. CEA 6.0 on 04/12/2021  CT chest 04/20/2021 no evidence of metastatic disease. CT abdomen/pelvis 04/20/2021 No evidence of progressive metastatic disease. Stable 2 cm lesion in the right lobe of the liver, likely representing treated metastatic disease. Imaging reviewed. Continue FOLFIRI + Avastin and repeat scans in 3 months  Cycle # 107 FOLFIRI + Avastin was on 04/27/2021  Cycle # 108 FOLFIRI + Avastin was on 05/11/2021. CEA 6.4 on 05/10/2021. Cycle # 109 FOLFIRI (held Avastin due to upcoming surgery) on 05/25/2021. CEA 6.3 on 5/24/21  Cycle # 110 FOLFIRI (held Avastin due to upcoming surgery) on 06/08/2021. CEA 6.3 on 6/07/21. CEA 5.8 on 06/28/2021. Right inguinal/scrotol hernia repair on 07/13/2021 with Dr Gloria Vergara with folely removal on 07/23/2021. He developed fever on 07/24/2021 and was brought to [address] [facility] ER via EMS; Sepsis secondary to urinary tract infection; Completed Abx  CT chest 08/13/2021 no sign of recurrent or metastatic disease. CT abdomen/pelvis 08/13/2021 unchanged hepatic lesions.  Splenomegaly and secondary signs of portal venous hypertension  CEA 4.0 on 08/16/2021  Hold chemotherapy at this due to thrombocytopenia (PLT 72).   Refer to HBP team for evaluation of liver lesions  F/U with GI Dr. Kaden Warren for scope   RTC 2 weeks    MSI testing noted no mismatch repair protein loss of expression    8/17/2021  Nery Lorenz MD  Board Certified Medical Oncologist

## 2021-08-19 ENCOUNTER — APPOINTMENT (OUTPATIENT)
Dept: INFUSION THERAPY | Age: 71
End: 2021-08-19
Payer: MEDICARE

## 2021-08-24 PROBLEM — N39.0 UTI (URINARY TRACT INFECTION): Status: RESOLVED | Noted: 2021-07-25 | Resolved: 2021-08-24

## 2021-08-30 ENCOUNTER — HOSPITAL ENCOUNTER (OUTPATIENT)
Age: 71
Discharge: HOME | End: 2021-08-30
Payer: MEDICARE

## 2021-08-30 ENCOUNTER — HOSPITAL ENCOUNTER (OUTPATIENT)
Dept: INFUSION THERAPY | Age: 71
Discharge: HOME | End: 2021-08-30
Payer: MEDICARE

## 2021-08-30 DIAGNOSIS — C78.7 RECTAL CANCER METASTASIZED TO LIVER (HCC): ICD-10-CM

## 2021-08-30 DIAGNOSIS — C20 RECTAL CANCER METASTASIZED TO LIVER (HCC): ICD-10-CM

## 2021-08-30 DIAGNOSIS — C79.51 BONE METASTASIS (HCC): ICD-10-CM

## 2021-08-30 LAB
ALBUMIN SERPL-MCNC: 3.5 G/DL (ref 3.5–5.2)
ALP BLD-CCNC: 138 U/L (ref 40–129)
ALT SERPL-CCNC: 14 U/L (ref 0–40)
ANION GAP SERPL CALCULATED.3IONS-SCNC: 9 MMOL/L (ref 7–16)
AST SERPL-CCNC: 28 U/L (ref 0–39)
BASOPHILS ABSOLUTE: 0 E9/L (ref 0–0.2)
BASOPHILS RELATIVE PERCENT: 0 % (ref 0–2)
BILIRUB SERPL-MCNC: 0.7 MG/DL (ref 0–1.2)
BUN BLDV-MCNC: 21 MG/DL (ref 6–23)
CALCIUM SERPL-MCNC: 8.9 MG/DL (ref 8.6–10.2)
CEA: 4.1 NG/ML (ref 0–5.2)
CHLORIDE BLD-SCNC: 104 MMOL/L (ref 98–107)
CO2: 25 MMOL/L (ref 22–29)
CREAT SERPL-MCNC: 1.1 MG/DL (ref 0.7–1.2)
EOSINOPHILS ABSOLUTE: 0.1 E9/L (ref 0.05–0.5)
EOSINOPHILS RELATIVE PERCENT: 3.3 % (ref 0–6)
GFR AFRICAN AMERICAN: >60
GFR NON-AFRICAN AMERICAN: >60 ML/MIN/1.73
GLUCOSE BLD-MCNC: 107 MG/DL (ref 74–99)
HCT VFR BLD CALC: 28.3 % (ref 37–54)
HEMOGLOBIN: 9.2 G/DL (ref 12.5–16.5)
LYMPHOCYTES ABSOLUTE: 0.66 E9/L (ref 1.5–4)
LYMPHOCYTES RELATIVE PERCENT: 22.2 % (ref 20–42)
MCH RBC QN AUTO: 30.4 PG (ref 26–35)
MCHC RBC AUTO-ENTMCNC: 32.5 % (ref 32–34.5)
MCV RBC AUTO: 93.4 FL (ref 80–99.9)
MONOCYTES ABSOLUTE: 0.27 E9/L (ref 0.1–0.95)
MONOCYTES RELATIVE PERCENT: 8.9 % (ref 2–12)
NEUTROPHILS ABSOLUTE: 2.01 E9/L (ref 1.8–7.3)
NEUTROPHILS RELATIVE PERCENT: 66.7 % (ref 43–80)
OVALOCYTES: ABNORMAL
PDW BLD-RTO: 15.4 FL (ref 11.5–15)
PLATELET # BLD: 80 E9/L (ref 130–450)
PLATELET CONFIRMATION: NORMAL
PMV BLD AUTO: 10.6 FL (ref 7–12)
POIKILOCYTES: ABNORMAL
POTASSIUM SERPL-SCNC: 4 MMOL/L (ref 3.5–5)
RBC # BLD: 3.03 E12/L (ref 3.8–5.8)
SODIUM BLD-SCNC: 138 MMOL/L (ref 132–146)
TOTAL PROTEIN: 7 G/DL (ref 6.4–8.3)
WBC # BLD: 3 E9/L (ref 4.5–11.5)

## 2021-08-30 PROCEDURE — 36415 COLL VENOUS BLD VENIPUNCTURE: CPT

## 2021-08-30 PROCEDURE — 80053 COMPREHEN METABOLIC PANEL: CPT

## 2021-08-30 PROCEDURE — 85025 COMPLETE CBC W/AUTO DIFF WBC: CPT

## 2021-08-30 PROCEDURE — 82378 CARCINOEMBRYONIC ANTIGEN: CPT

## 2021-08-31 ENCOUNTER — HOSPITAL ENCOUNTER (OUTPATIENT)
Dept: INFUSION THERAPY | Age: 71
Discharge: HOME | End: 2021-08-31
Payer: MEDICARE

## 2021-08-31 ENCOUNTER — OFFICE VISIT (OUTPATIENT)
Dept: ONCOLOGY | Age: 71
End: 2021-08-31
Payer: MEDICARE

## 2021-08-31 VITALS — DIASTOLIC BLOOD PRESSURE: 75 MMHG | HEART RATE: 59 BPM | SYSTOLIC BLOOD PRESSURE: 140 MMHG

## 2021-08-31 VITALS
DIASTOLIC BLOOD PRESSURE: 69 MMHG | WEIGHT: 186.9 LBS | TEMPERATURE: 98.7 F | HEIGHT: 73 IN | SYSTOLIC BLOOD PRESSURE: 136 MMHG | OXYGEN SATURATION: 100 % | HEART RATE: 67 BPM | RESPIRATION RATE: 18 BRPM | BODY MASS INDEX: 24.77 KG/M2

## 2021-08-31 DIAGNOSIS — C20 RECTAL CANCER (HCC): Primary | ICD-10-CM

## 2021-08-31 DIAGNOSIS — C20 RECTAL CANCER METASTASIZED TO LIVER (HCC): Primary | ICD-10-CM

## 2021-08-31 DIAGNOSIS — C79.51 BONE METASTASIS (HCC): ICD-10-CM

## 2021-08-31 DIAGNOSIS — C78.7 RECTAL CANCER METASTASIZED TO LIVER (HCC): Primary | ICD-10-CM

## 2021-08-31 PROCEDURE — 6360000002 HC RX W HCPCS: Performed by: INTERNAL MEDICINE

## 2021-08-31 PROCEDURE — 99214 OFFICE O/P EST MOD 30 MIN: CPT | Performed by: INTERNAL MEDICINE

## 2021-08-31 PROCEDURE — 96413 CHEMO IV INFUSION 1 HR: CPT

## 2021-08-31 PROCEDURE — 96415 CHEMO IV INFUSION ADDL HR: CPT

## 2021-08-31 PROCEDURE — 96368 THER/DIAG CONCURRENT INF: CPT

## 2021-08-31 PROCEDURE — 96375 TX/PRO/DX INJ NEW DRUG ADDON: CPT

## 2021-08-31 PROCEDURE — 96411 CHEMO IV PUSH ADDL DRUG: CPT

## 2021-08-31 PROCEDURE — 96372 THER/PROPH/DIAG INJ SC/IM: CPT

## 2021-08-31 PROCEDURE — 2580000003 HC RX 258: Performed by: INTERNAL MEDICINE

## 2021-08-31 RX ORDER — DEXAMETHASONE SODIUM PHOSPHATE 10 MG/ML
10 INJECTION, SOLUTION INTRAMUSCULAR; INTRAVENOUS ONCE
Status: CANCELLED | OUTPATIENT
Start: 2021-08-31

## 2021-08-31 RX ORDER — METHYLPREDNISOLONE SODIUM SUCCINATE 125 MG/2ML
125 INJECTION, POWDER, LYOPHILIZED, FOR SOLUTION INTRAMUSCULAR; INTRAVENOUS ONCE
Status: CANCELLED | OUTPATIENT
Start: 2021-08-31 | End: 2021-08-31

## 2021-08-31 RX ORDER — PALONOSETRON HYDROCHLORIDE 0.05 MG/ML
0.25 INJECTION, SOLUTION INTRAVENOUS ONCE
Status: COMPLETED | OUTPATIENT
Start: 2021-08-31 | End: 2021-08-31

## 2021-08-31 RX ORDER — ATROPINE SULFATE 0.4 MG/ML
0.4 AMPUL (ML) INJECTION ONCE
Status: CANCELLED | OUTPATIENT
Start: 2021-08-31 | End: 2021-08-31

## 2021-08-31 RX ORDER — FLUOROURACIL 50 MG/ML
650 INJECTION, SOLUTION INTRAVENOUS ONCE
Status: COMPLETED | OUTPATIENT
Start: 2021-08-31 | End: 2021-08-31

## 2021-08-31 RX ORDER — DIPHENHYDRAMINE HYDROCHLORIDE 50 MG/ML
50 INJECTION INTRAMUSCULAR; INTRAVENOUS ONCE
Status: CANCELLED | OUTPATIENT
Start: 2021-08-31 | End: 2021-08-31

## 2021-08-31 RX ORDER — 0.9 % SODIUM CHLORIDE 0.9 %
10 VIAL (ML) INJECTION ONCE
Status: CANCELLED | OUTPATIENT
Start: 2021-08-31 | End: 2021-08-31

## 2021-08-31 RX ORDER — HEPARIN SODIUM (PORCINE) LOCK FLUSH IV SOLN 100 UNIT/ML 100 UNIT/ML
500 SOLUTION INTRAVENOUS PRN
Status: CANCELLED | OUTPATIENT
Start: 2021-08-31

## 2021-08-31 RX ORDER — SODIUM CHLORIDE 9 MG/ML
INJECTION, SOLUTION INTRAVENOUS CONTINUOUS
Status: CANCELLED | OUTPATIENT
Start: 2021-08-31

## 2021-08-31 RX ORDER — HEPARIN SODIUM (PORCINE) LOCK FLUSH IV SOLN 100 UNIT/ML 100 UNIT/ML
500 SOLUTION INTRAVENOUS PRN
Status: DISCONTINUED | OUTPATIENT
Start: 2021-08-31 | End: 2021-09-01 | Stop reason: HOSPADM

## 2021-08-31 RX ORDER — PALONOSETRON HYDROCHLORIDE 0.05 MG/ML
0.25 INJECTION, SOLUTION INTRAVENOUS ONCE
Status: CANCELLED | OUTPATIENT
Start: 2021-08-31

## 2021-08-31 RX ORDER — EPINEPHRINE 1 MG/ML
0.3 INJECTION, SOLUTION, CONCENTRATE INTRAVENOUS PRN
Status: CANCELLED | OUTPATIENT
Start: 2021-08-31

## 2021-08-31 RX ORDER — SODIUM CHLORIDE 9 MG/ML
250 INJECTION, SOLUTION INTRAVENOUS CONTINUOUS
Status: CANCELLED | OUTPATIENT
Start: 2021-08-31 | End: 2021-09-18

## 2021-08-31 RX ORDER — FLUOROURACIL 50 MG/ML
650 INJECTION, SOLUTION INTRAVENOUS ONCE
Status: CANCELLED | OUTPATIENT
Start: 2021-08-31

## 2021-08-31 RX ORDER — DEXAMETHASONE SODIUM PHOSPHATE 10 MG/ML
10 INJECTION INTRAMUSCULAR; INTRAVENOUS ONCE
Status: COMPLETED | OUTPATIENT
Start: 2021-08-31 | End: 2021-08-31

## 2021-08-31 RX ORDER — ATROPINE SULFATE 0.4 MG/ML
0.4 AMPUL (ML) INJECTION ONCE
Status: COMPLETED | OUTPATIENT
Start: 2021-08-31 | End: 2021-08-31

## 2021-08-31 RX ORDER — SODIUM CHLORIDE 9 MG/ML
250 INJECTION, SOLUTION INTRAVENOUS CONTINUOUS
Status: DISCONTINUED | OUTPATIENT
Start: 2021-08-31 | End: 2021-09-01 | Stop reason: HOSPADM

## 2021-08-31 RX ORDER — SODIUM CHLORIDE 0.9 % (FLUSH) 0.9 %
10 SYRINGE (ML) INJECTION PRN
Status: CANCELLED | OUTPATIENT
Start: 2021-08-31

## 2021-08-31 RX ADMIN — Medication 500 UNITS: at 11:42

## 2021-08-31 RX ADMIN — PALONOSETRON 0.25 MG: 0.25 INJECTION, SOLUTION INTRAVENOUS at 09:17

## 2021-08-31 RX ADMIN — SODIUM CHLORIDE 320 MG: 9 INJECTION, SOLUTION INTRAVENOUS at 09:45

## 2021-08-31 RX ADMIN — DENOSUMAB 120 MG: 120 INJECTION SUBCUTANEOUS at 09:39

## 2021-08-31 RX ADMIN — LEUCOVORIN CALCIUM 650 MG: 10 INJECTION INTRAMUSCULAR; INTRAVENOUS at 09:44

## 2021-08-31 RX ADMIN — FLUOROURACIL 650 MG: 50 INJECTION, SOLUTION INTRAVENOUS at 11:35

## 2021-08-31 RX ADMIN — ATROPINE SULFATE 0.4 MG: 0.4 INJECTION, SOLUTION INTRAMUSCULAR; INTRAVENOUS; SUBCUTANEOUS at 09:18

## 2021-08-31 RX ADMIN — DEXAMETHASONE SODIUM PHOSPHATE 10 MG: 10 INJECTION INTRAMUSCULAR; INTRAVENOUS at 09:19

## 2021-08-31 RX ADMIN — SODIUM CHLORIDE 250 ML: 9 INJECTION, SOLUTION INTRAVENOUS at 09:16

## 2021-08-31 NOTE — PROGRESS NOTES
Patient presents to clinic today for Xgeva injection. Patient's labs monitored, specifically, Calcium level, to ensure no increased risk of hypocalcemia with administration of the medication. Patient's calcium level is 8.9 mg/dL. Patient received therapy and will continue to be monitored prior to each dose.     Benja Butterfield, [address] 8/31/2021 9:35 AM

## 2021-08-31 NOTE — PROGRESS NOTES
Patient presents to clinic today for Xgeva injection. Patient's labs monitored, specifically, Calcium level, to ensure no increased risk of hypocalcemia with administration of the medication. Patient's calcium level is 8.9 mg/dL. Patient received therapy and will continue to be monitored prior to each dose.     Washington Lovell, [address] 8/31/2021 9:35 AM

## 2021-08-31 NOTE — PROGRESS NOTES
Patient presents to clinic today for Xgeva injection. Patient's labs monitored, specifically, Calcium level, to ensure no increased risk of hypocalcemia with administration of the medication. Patient's calcium level is 8.9 mg/dL. Patient received therapy and will continue to be monitored prior to each dose.     Chuck Guzman, [address] 8/31/2021 9:35 AM

## 2021-08-31 NOTE — PROGRESS NOTES
Mutation: Mutation not detected, wild type.     CEA 3488. Bone scan on 11/10/2015 noted no metastatic disease. CT chest on 11/10/2015 revealed Multiple pulmonary nodules in the upper and lower lobes consistent with metastatic disease;   Hepatic metastasis also visualized. 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 Mediport was placed. Cycle # 1 of FOLFOX + Avastin was on 11/23/2015. CEA was 4555 on 11/23/2015. Cycle # 2 of FOLFOX + Avastin was on 12/08/2015. CEA was 3160 on 12/08/2015. Cycle # 3 of FOLFOX + Avastin was on 12/22/2015. CEA was 2516 on 12/21/2015. Cycle # 4 of FOLFOX + Avastin was on 01/05/2016. CEA was 2098 on 01/05/2015. Cycle # 5 of FOLFOX + Avastin was on 01/19/2016. CEA was 1511 on 01/05/2015.     -Bone scan on 01/26/2016 noted no metastatic disease. CT chest on 01/26/2016 revealed Significant response to treatment with no visible residual nodules. CT scan abdomen/pelvis on 01/26/2016 noted Interval decreased size of multiple masses in the liver compatible with treatment response. Continue another 2 months of FOLFOX + Avastin and repeat scans. Cycle # 6 of FOLFOX + Avastin was on 02/02/2016.  on 02/02/2016. Cycle # 7 of FOLFOX + Avastin was on 02/16/2016.  on 02/16/2016. Cycle # 8 of FOLFOX + Avastin was on 03/01/2016.  on 03/01/2016. Cycle # 9 of FOLFOX + Avastin was on 03/15/2016.  on 03/15/2016. Cycle # 10 of FOLFOX + Avastin was on 03/29/2016. .4 on 03/29/2016. Cycle # 11 of FOLFOX + Avastin was on 04/12/2016. .4 on 04/12/2016.     Re-staging scans on 04/19/2016: CT Chest: clear lungs; no evidence of recurring pulmonary nodule; CT Abdomen/Pelvis: Further interval decrease in size of the multiple metastatic hepatic lesions, the largest lesion now measures 3.9 x 3.5 cm and previously measured 4.5 cm in maximum diameter.  No mesenteric lymphadenopathy is identified; Bone Scan: No evidence of look at the cecal area) unremarkable. CEA 26.7 on 08/30/2016. Cycle # 4 FOLFIRI/Avastin was on 09/13/2016. CEA 23.4 on 09/13/2016. Cycle # 5 FOLFIRI/Avastin was on 09/27/2016. CEA 22.3 on 09/27/2016. Cycle # 6 FOLFIRI/Avastin was on 10/11/2016. CEA 18.4 on 10/11/2016.      Bone scan 10/21/2016 stable bone metastasis; ? New lesion anterior left sixth rib likely interval healing fracture. CT abdomen/pelvis revealed stable hepatic metastasis. CT chest negative for metastatic disease. Continue FOLFIRI/Avastin and repeat scans in 3 months. Cycle # 7 FOLFIRI/Avastin was on 10/25/2016. CEA 16.1  Cycle # 8 FOLFIRI/Avastin was on 11/08/2016. CEA 15.7  Cycle # 9 FOLFIRI/Avastin was on 11/29/2016. CEA 13.6 on 11/29/2016. Cycle # 10 FOLFIRI/Avastin was on 12/13/2016. CEA 10.6 on 12/13/2016. Cycle # 11 FOLFIRI/Avastin was on 12/27/2016. CEA 11.1 on 12/27/2016. Cycle # 12 FOLFIRI/Avastin was on 01/10/2017. CEA 9.7 on 01/10/2017.       CT chest 01/23/2017 No new pulmonary nodule or lymphadenopathy. Patchy groundglass opacities within the left lower lobe, suggestive of infectious or inflammatory etiology. Followup CT chest in 3 months. Slight increased linear sclerosis along the left anterior sixth rib corresponding to an area of increased uptake on the prior bone scan from 10/21/2016, favored to be related to interval healing changes of a nondisplaced fracture. CT abdomen/pelvis on 01/23/2017 Redemonstration of multiple hepatic metastases, which are similar compared to the prior CT from 10/21/2016. Redemonstration of area of increased sclerosis along the right acetabulum suspicious for metastatic disease. Bone scan on 01/23/2017 Stable subtle uptake within the left proximal diaphysis, again suggestive of metastatic disease  Decreased subtle uptake within the medial right acetabulum.    Decreased uptake within the left anterior sixth rib corresponding to linear sclerosis on the recent CT, favored to be related to an joint rib fracture. Continue FOLFIRI + Avastin and repeat scans in 3 months. Cycle # 13 FOLFIRI + Avastin was on 01/24/2017. Cycle # 14 FOLFIRI + Avastin was on 02/07/2017. Cycle # 15 FOLFIRI + Avastin was on 02/21/2017. Cycle # 16 FOLFIRI + Avastin was on 03/07/2017. Cycle # 17 FOLFIRI + Avastin was on 03/21/2017. Cycle # 18 FOLFIRI + Avastin was on 04/04/2017. CT chest 04/17/2017 negative for metastatic disease. CT abdomen/pelvis 04/17/2017 Stable hypodense metastatic lesions within the liver. Stable area of increased sclerosis along the right acetabulum  Bone scan 04/17/2017 Stable subtle uptake within the left proximal femoral diaphysis; No definite abnormal uptake in the region of a sclerotic lesion along the posterior column of the right acetabulum noted on CT. Stable slight uptake within the left anterior sixth rib corresponding to linear sclerosis on the recent CT, favored to be related to a fracture. Continue FOLFIRI + Avastin and repeat scans in 3 months. Cycle # 19 FOLFIRI + Avastin was on 04/18/2017. Cycle # 20 FOLFIRI + Avastin was on 05/02/2017. Cycle # 21 FOLFIRI + Avastin was on 05/16/2017. Cycle # 22 FOLFIRI + Avastin was on 05/30/2017. Cycle # 23 FOLFIRI + Avastin was on 06/13/2017. Cycle # 24 FOLFIRI + Avastin was on 06/27/2017. Cycle # 25 FOLFIRI + Avastin was on 07/11/2017. Cycle # 26 FOLFIRI + Avastin was on 07/25/2017. Cycle # 27 FOLFIRI + Avastin was on 08/08/2017. Cycle # 28 FOLFIRI + Avastin was on 08/22/2017. Cycle # 29 FOLFIRI + Avastin was on 09/05/2017. Cycle # 30 FOLFIRI + Avastin was on 09/19/2017. Bone scan 09/26/2017 stable. CT abdomen/pelvis on 09/26/2017 Stable hypodense mass in the liver. Stable sclerotic lesion in the acetabulum. CT chest 09/26/2017 negative for metastatic disease. Cycle # 31 FOLFIRI + Avastin was on 10/03/2017. CEA 7.4 on 10/03/2017. Cycle # 32 FOLFIRI + Avastin was on 10/17/2017. CEA 6.0 on 10/17/2017.   Cycle # 33 FOLFIRI + Avastin was on 10/31/2017. CEA 6.8 on 10/31/2017. Cycle # 34 FOLFIRI + Avastin was on 11/14/2017. CEA 7.2 on 11/14/2017. Cycle # 35 FOLFIRI + Avastin was on 11/28/2017. CEA 5.1 on 11/28/2017. Cycle # 36 FOLFIRI + Avastin was on 12/12/2017. CEA 6.1 on 12/12/2017. Bone scan 12/22/2017 noted no evidence of metastatic disease to the axial or appendicular skeleton. CT chest 12/22/2017 noted no evidence of metastatic disease. CT abdomen/pelvis 12/22/2017 noted stable hypodense lesions in the liver. Continue FOLFIRI + Avastin and repeat scans in 3 months. Cycle # 37 FOLFIRI + Avastin was on 12/27/2018. Cycle # 38 FOLFIRI + Avastin was on 01/09/2018. Cycle # 39 FOLFIRI + Avastin was on 01/23/2018. Cycle # 40 FOLFIRI + Avastin was on 02/06/2018. Cycle # 41 FOLFIRI + Avastin was on 02/20/2018. Cycle # 42 FOLFIRI + Avastin was on 03/06/2018. Cycle # 43 FOLFIRI + Avastin was on 03/20/2018. Bone scan on 03/26/2018 negative for metastatic disease. CT chest 03/26/2018 noted ? new 7 mm nodule in LUCY. CT abdomen/pelvis 03/26/2018 noted stable hypodense lesions in the liver. ? New small ascites in the abdomen. Patient wants to continue to monitor the lung finding and repeat scans in 2 months instead of changing the current regimen into oral Lonsurf. Cycle # 44 FOLFIRI + Avastin was on 04/03/2018. CEA 6.3 on 04/03/2018. Cycle # 45 FOLFIRI + Avastin was on 04/24/2018. CEA 5.3 on 04/24/2018. Cycle # 46 FOLFIRI + Avastin was on 05/08/2018. CEA 5.4 on 05/08/2018. Cycle # 47 FOLFIRI + Avastin was on 05/22/2018. CEA 6.1 on 05/22/2018. CT chest 05/31/2018 noted stable nodule in LUCY. No evidence of worsening malignancy. CT abdomen/pelvis on 05/31/2018 noted stable liver metastasis. No evidence of worsening malignancy. Continue FOLFIRI + Avastin and repeat scans in 3 months. Cycle # 48 FOLFIRI + Avastin was on 06/06/2018. Cycle # 49 FOLFIRI + Avastin was on 06/19/2018.    Cycle # 50 FOLFIRI + Avastin was on 07/03/2018. Cycle # 51 FOLFIRI + Avastin was on 07/24/2018. Cycle # 52 FOLFIRI + Avastin was on 08/14/2018. Cycle # 53 FOLFIRI + Avastin was on 08/28/2018. CT chest 09/06/2018 noted interval decrease in size of LUCY measuring up to 4 mm, suggestive of treatment response. No mediastinal or hilar LN  CT abdomen/pelvis 09/06/2018 Slight interval increase in size of a previously noted metastatic lesion within the right hepatic lobe. This may be related to differences in phase of enhancement compared to the most recent study from 5/31/2018, the lesion remains decreased in size compared to the more previous studies. No abdominal, retroperitoneal, or pelvic lymphadenopathy. Continue FOLFIRI + Avastin and repeat scans in 2 months. Cycle # 54 FOLFIRI + Avastin was on 09/11/2018. Cycle # 55 FOLFIRI + Avastin was on 09/25/2018. Cycle # 56 FOLFIRI + Avastin was on 10/09/2018. Cycle # 57 FOLFIRI + Avastin was on 10/23/2018. CT chest 11/02/2018 stable 3 mm nodule within LUCY. No new right and left lung nodule. No mediastinal or osseous lesion. CT abdomen/pelvis 11/02/2018 stable metastatic disease to liver. No new metastatic disease identified. No mesenteric or retroperitoneal LN. No gross colonic lesion identified. Continue FOLFIRI + Avastin and repeat scans in 3 months. Cycle # 58 FOLFIRI + Avastin was on 11/06/2018. CEA 7.6 on 11/05/2018. Cycle # 59 FOLFIRI + Avastin was on 11/27/2018. CEA 6.9 on 11/26/2018. Cycle # 60 FOLFIRI + Avastin was on 12/11/2018. CEA 6.7 on 12/11/2018. Cycle # 61 FOLFIRI + Avastin was on 01/08/2019. CEA 5.6 on 01/07/2019. Cycle # 62 FOLFIRI + Avastin was on 01/29/2019. CEA 4.6 on 01/28/2019. Cycle # 63 FOLFIRI + Avastin was on 02/12/2019. CEA 4.3 on 02/11/2019. CT chest 02/21/2019 no evidence of metastatic disease. CT abdomen/pelvis 02/21/2019 stable hypodense liver lesions. No evidence of worsening metastatic disease.  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 Ordered MRI thoracic spine and referred to neurosurgery team.    Cycle # 64 FOLFIRI + Avastin was on 02/26/2019. CEA 4.7 on 02/25/2019. Cycle # 65 FOLFIRI + Avastin was on 03/12/2019. CEA 4.0 on 03/11/2019. Cycle # 66 FOLFIRI + Avastin was on 03/26/2019. CEA 4.7 on 03/25/2019. Cycle # 67 FOLFIRI + Avastin was on 04/09/2019. CEA 5.6 on 04/08/2019. Cycle # 68 FOLFIRI + Avastin was on 04/30/2019. CEA 4.9 on 04/29/2019. Cycle # 69 FOLFIRI + Avastin was on 05/14/2019. CEA 5.3 on 05/14/2019. CT chest 05/23/2019 stable small lung nodule with no evidence of metastatic disease. CT abdomen/pelvis 05/23/2019 Decrease conspicuity of the liver lesions. No new lesions seen. Stable splenomegaly. Continue FOLFIRI + Avastin and repeat scans in 3 months. Cycle # 70 FOLFIRI + Avastin was on 06/04/2019. CEA 4.8 on 06/03/2019. Cycle # 71 FOLFIRI + Avastin was on 06/18/2019. CEA 4.6 on 06/17/2019. Cycle # 72 FOLFIRI + Avastin was on 07/09/2019. CEA 4.3 on 07/08/2019. Cycle # 73 FOLFIRI + Avastin was on 07/30/2019. CEA 5.0 on 07/29/2019. Cycle # 74 FOLFIRI + Avastin was on 08/13/2019. CEA 5.0 on 08/12/2019. CT chest 08/20/2019 negative  CT abdomen/pelvis 08/20/2019 Previous identified hepatic lesions are not visualized on the current exam.  Continue FOLFIRI + Avastin and repeat scans in 3 months. Cycle # 75 FOLFIRI + Avastin was on 08/27/2019. CEA 5.0 on 08/26/2019. Cycle # 76 FOLFIRI + Avastin was on 09/17/2019. CEA 5.5 on 09/16/2019. Cycle # 77 FOLFIRI + Avastin was on 10/15/2019. CEA 4.6 on 10/15/2019. Cycle # 78 FOLFIRI + Avastin was on 10/29/2019. CEA 4.1 on 10/28/2019. Cycle # 79 FOLFIRI + Avastin was on 11/12/2019. CEA 4.3 on 11/12/2019. Cycle # 80 FOLFIRI + Avastin was on 12/10/2019. CEA 4.0 on 12/09/2019.   CT chest 12/19/2019 Stable 3 mm nodule within the left upper lobe, similar to the previous studies dating back to 11/2/2018, however decreased in size compared to the CT from 3/26/2018. No thoracic LN. CT abdomen/pelvis 12/19/2019 Previously described small hypodense lesions are more conspicuous on the current study compared to the recent study throughout the liver from 8/20/2019, however similar to the prior study from 2/21/2019. Findings may be related to differences in contrast opacification. No abdominal or pelvic lymphadenopathy. Continue FOLFIRI + Avastin and repeat scans in 2 months to f/u on liver lesions. Cycle # 81 FOLFIRI + Avastin was on 01/07/2020. CEA 3.8 on 01/06/2020. Cycle # 82 FOLFIRI + Avastin was on 01/21/2020. CEA 3.7 on 01/20/2020. Cycle # 83 FOLFIRI + Avastin was on 02/04/2020. CEA 4.1 on 02/03/2020. Cycle # 84 FOLFIRI + Avastin was on 02/18/2020. CEA 4.6 on 02/17/2020. EGD by Dr. Darya Ford 03/02/2020 showing no masses or lesions  Cycle # 85 FOLFIRI + Avastin was on 03/03/2020. CEA 7.4 on 03/02/2020. Cycle # 86 FOLFIRI + Avastin was on 03/17/2020. CEA 4.5 on 03/16/2020. Colonoscopy on 03/23/2020 by Dr. Darya Ford unremarkable (records requested). Cycle # 87 FOLFIRI + Avastin was on 03/31/2020. CEA 4.1 on 03/30/2020. Cycle # 88 FOLFIRI + Avastin was on 04/21/2020. CEA 6.4 on 04/20/2020. Cycle # 89 FOLFIRI + Avastin was on 05/05/2020. CEA 5.8 on 05/04/2020. Cycle # 90 FOLFIRI + Avastin was on 06/02/2020. CEA 5.5 on 06/01/2020. Cycle # 91 FOLFIRI + Avastin was on 06/16/2020. CEA 5.6 on 06/15/2020. CT chest 06/23/2020 noted no metastatic disease in the chest.   CT abdomen/pelvis 06/23/2020 Stable small liver lesions measuring up to 5 mm since 2019.   22 mm round mass in segment 8 of the liver with central high density stable from December 2019), previously measuring up to 34 mm in 2017. No additional metastatic disease in the abdomen or pelvis. Small amount of ascites. Overall stable disease. Continue FOLFIRI + Avastin and repeat scans in 2-3 months. Cycle # 92 FOLFIRI + Avastin was on 07/07/2020. CEA 5.7 on 07/06/2020. Cycle # 93 FOLFIRI + Avastin was on 07/21/2020. CEA 5.9 on 07/20/2020. Cycle # 94 FOLFIRI + Avastin was on 08/04/2020. CEA 5.5 on 08/04/2020. Cycle # 95 FOLFIRI + Avastin was on 08/25/2020. CEA 6.1 on 08/24/2020. CT chest 9/2/2020 stable unremarkable enhanced CT of the thorax. There is no metastatic disease to the lungs. CT abdomen pelvis on 9/2/2020 stable 2.2 cm low attenuated lesion within the central aspect of the right hepatic lobe favored to represent treated metastatic disease. No new hepatic lesion is identified. Stable splenomegaly  There is no appreciable colonic lesion or stricture  Pericholecystic fluid of uncertain etiology. General surgery team consulted. Laparoscopic cholecystectomy with normal cholangiogram 10/27/20  Gallbladder: Chronic cholecystitis and cholelithiasis.  Unremarkable regional lymph node. 11/13/2020; Seen by Dr. Gayathri Colon from General surgery team; cleared to re-start chemotherapy. Cycle # 96 FOLFIRI + Avastin was on 11/17/2020. CEA 3.6 on 11/16/2020. Cycle # 97 FOLFIRI + Avastin was on 12/01/2020. CEA 3.5 on 11/30/2020. Cycle # 98 FOLFIRI + Avastin was on 12/15/2020. CEA 4.3 on 12/15/2020. Cycle # 99 FOLFIRI + Avastin was on 01/05/2021. CEA 4.7 on 01/04/2021. CT chest 01/13/2021 negative for metastatic disease. CT abdomen/pelvis 01/13/2021 Unchanged central hepatic lesion. No specific signs of abdominopelvic metastatic disease. Continue FOLFIRI + Avastin and repeat scans in 3 months. Cycle # 100 FOLFIRI + Avastin was on 01/19/2021. CEA 4.7 on 01/18/2021. Cycle # 101 FOLFIRI + Avastin was on 02/02/2021. CEA 5.2 on 02/01/2021. Cycle # 102 FOLFIRI + Avastin was on 02/16/2021. CEA 5.6 on 02/15/2021. Cycle # 103 FOLFIRI + Avastin was on 03/02/2021. Cycle # 104 FOLFIRI (20% dose reduced due to significant toxicity) + Avastin was on 03/16/2021. Cycle # 105 FOLFIRI (same dose as cycle # 104) + Avastin was on 03/30/2021. CEA 6.5 on 03/29/2021.   Cycle # 106 FOLFIRI (same dose as cycle # 104) + Avastin was on 04/13/2021. CEA 6.0 on 04/12/2021  CT chest 04/20/2021 no evidence of metastatic disease. CT abdomen/pelvis 04/20/2021 No evidence of progressive metastatic disease. Stable 2 cm lesion in the right lobe of the liver, likely representing treated metastatic disease. Imaging reviewed. Continue FOLFIRI + Avastin and repeat scans in 3 months  Cycle # 107 FOLFIRI + Avastin was on 04/27/2021  Cycle # 108 FOLFIRI + Avastin was on 05/11/2021. Cycle # 109 FOLFIRI (held Avastin due to upcoming surgery) on 05/25/2021. CEA 6.3 on 5/24/21  Cycle # 110 FOLFIRI (held Avastin due to upcoming surgery) on 06/08/2021. CEA 6.3 on 6/07/21. Right inguinal/scrotol hernia repair on 07/13/2021 with Dr Bertha Mckeon with folely removal on Friday 07/23/2021. He developed fever on 07/24/2021 and was brought to [address] [place] [address],[address] ER via EMS; Sepsis secondary to urinary tract infection   Completed Abx  CEA 4.1 on 08/30/2021  Cycle # 111 FOLFIRI today 08/31/2021. Refer to HBP team for evaluation of liver lesions  F/U with GI Dr. Griselda Ramey for scope (scheduled on 09/20/2021)  Today 08/31/2021 for f/u. No fever, chills. No nausea/vomiting. Fair appetite. Review of Systems;  CONSTITUTIONAL: No fever, chills. Fair appetite and energy level  ENMT: Eyes: No diplopia; Nose: No epistaxis. Mouth:No lesions  RESPIRATORY: No hemoptysis, shortness of breath. CARDIOVASCULAR: No chest pain, palpitations. GASTROINTESTINAL:No N/V. Recent hernia surgery by Dr. Boyd Hammans: No dysuria, urinary frequency, hematuria. NEURO: No syncope, presyncope, headache. Remainder ROS: Negative. Past Medical History:   Past Medical History               Diagnosis Date    Arthritis      Cancer ([facility] [address]         colon    Depression      Hyperlipidemia      Hypertension           Medications:  Reviewed and reconciled.     Allergies:        Allergies   Allergen Reactions    Neosporin [Neomycin-Polymyxin-Gramicidin] Rash    Tape Loyce Isauro Tape] Rash      Physical Exam:  /69   Pulse 67   Temp 98.7 °F (37.1 °C)   Resp 18   Ht 6' 1\" (1.854 m)   Wt 186 lb 14.4 oz (84.8 kg)   SpO2 100%   BMI 24.66 kg/m²   GENERAL: Alert, oriented x 3, not in distress  HEENT: PERRLA, EOMI. NECK: Supple. Without lymphadenopathy. LUNGS: Lungs are CTA bilaterally, with no wheezing, crackles or rhonchi. CARDIOVASCULAR: Regular rhythm. No murmurs, rubs or gallops. ABDOMEN: Soft. Non-tender, non-distended. Positive bowel sounds. hernia  EXTREMITIES: Without clubbing, cyanosis or edema. NEUROLOGIC: No focal deficits. ECOG PS 1    Diagnostics:  Lab Results   Component Value Date    WBC 3.0 (L) 08/30/2021    HGB 9.2 (L) 08/30/2021    HCT 28.3 (L) 08/30/2021    MCV 93.4 08/30/2021    PLT 80 (L) 08/30/2021     Lab Results   Component Value Date     08/30/2021    K 4.0 08/30/2021     08/30/2021    CO2 25 08/30/2021    BUN 21 08/30/2021    CREATININE 1.1 08/30/2021    GLUCOSE 107 (H) 08/30/2021    CALCIUM 8.9 08/30/2021    PROT 7.0 08/30/2021    LABALBU 3.5 08/30/2021    BILITOT 0.7 08/30/2021    ALKPHOS 138 (H) 08/30/2021    AST 28 08/30/2021    ALT 14 08/30/2021    LABGLOM >60 08/30/2021    GFRAA >60 08/30/2021     Lab Results   Component Value Date    CEA 4.1 08/30/2021     Impression/Plan:  65 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 KRAS Mutation: Mutation detected. BRAF Mutation: Mutation not detected. 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 CT scan abdomen/pelvis on 10/26/2015 revealed small nodules at the lung bases, extensive liver lesions consistent with metastatic rectosigmoid cancer. CEA 3488 on 10/30/2015. Bone scan on 11/10/2015 noted no metastatic disease. CT chest on 11/10/2015 revealed Multiple pulmonary nodules in the upper and lower lobes consistent with metastatic disease; Hepatic metastasis also visualized.  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 Mediport was placed. Cycle # 1 of FOLFOX + Avastin was on 11/23/2015. CEA was 4555 on 11/23/2015. Cycle # 2 of FOLFOX + Avastin was on 12/08/2015. CEA was 3160 on 12/08/2015. Cycle # 3 of FOLFOX + Avastin was on 12/22/2015. CEA was 2516 on 12/21/2015. Cycle # 4 of FOLFOX + Avastin was on 01/05/2016. CEA was 2098 on 01/05/2015. Cycle # 5 of FOLFOX + Avastin was on 01/19/2016. CEA was 1511 on 01/05/2015.     -Bone scan on 01/26/2016 noted no metastatic disease. CT chest on 01/26/2016 revealed Significant response to treatment with no visible residual nodules. CT scan abdomen/pelvis on 01/26/2016 noted Interval decreased size of multiple masses in the liver compatible with treatment response. Continue another 2 months of FOLFOX + Avastin and repeat scans. Cycle # 6 of FOLFOX + Avastin was on 02/02/2016.  on 02/02/2016. Cycle # 7 of FOLFOX + Avastin was on 02/16/2016.  on 02/16/2016. Cycle # 8 of FOLFOX + Avastin was on 03/01/2016.  on 03/01/2016. Cycle # 9 of FOLFOX + Avastin was on 03/15/2016.  on 03/15/2016. Cycle # 10 of FOLFOX + Avastin was on 03/29/2016. .4 on 03/29/2016. Cycle # 11 of FOLFOX + Avastin was on 04/12/2016. .4 on 04/12/2016.     Re-staging scans on 04/19/2016: CT Chest: clear lungs; no evidence of recurring pulmonary nodule; CT Abdomen/Pelvis: Further interval decrease in size of the multiple metastatic hepatic lesions, the largest lesion now measures 3.9 x 3.5 cm and previously measured 4.5 cm in maximum diameter. No mesenteric lymphadenopathy is identified; Bone Scan: No evidence of osseous metastasis. Continue same regimen and re-stage in 2-3 months. Cycle # 12 FOLFOX + Avastin was on 04/26/2016. .5 on 04/26/2016. Cycle # 13 FOLFOX + Avastin was on 05/10/2016. .3 on 05/10/2016. Cycle # 14 FOLFOX+AVASTIN was on 05/24/2016. .5 on 05/24/2016.   Cycle # 15 FOLFOX + Avastin was on 06/07/2016. CEA 90.5 on 06/07/2016. Ad[facility] 06/13/2016-06/16/2016 for abdominal pain: EGD noted 1.5 cm clean based duodenal bulb ulceration s/p epinephrine and bicap per Dr. Eliza Goodman. No active bleeding. A. Stomach, biopsy: Mild chronic gastritis, immunostain negative for Helicobacter  B. Esophagus, biopsy: Gastric glandular mucosa with prominent ntestinal metaplasia ([place]'s epithelium), negative for epithelial dysplasia, esophageal squamous mucosa not identified     Cycle # 16 FOLFOX (discontinued avastin (bevacizumab) given association of peptic ulcer disease and known association of GI perforation.) was on 06/21/2016. CEA 47 on 06/21/2016. Cycle # 17 FOLFOX was on 07/05/2016. CEA 52.6 on 07/05/2016. CEA 47.6 on 07/19/2016.     Re-staging scans 07/19/2106: CT Chest negative for metastatic disease. CT Abdomen/Pelvis: Stable hepatic lesions; Question new mural thickening in the cecum. Bone Scan: New Let hip lesion suspicious for bone metastasis.     Increased CEA; new mural thickening in the cecum and new left hip lesion suspicious for bone metastasis are consistent with disease progression; He derived maximum benefit from FOLFOX/AVASTIN. D/C FOLFOX/AVASTIN. We recommended FOLFIRI second line therapy. Cycle # 1 FOLFIRI was on 08/02/2016. CEA 40.8 on 08/02/2016. Xgeva q4 weeks started on 08/02/2016. Cycle # 2 FOLFIRI was on 08/16/2016. CEA 31.7 on 08/16/2016. Cycle # 3 FOLFIRI (added Avastin) was on 08/30/2016 given that ulcers healed on EGD 08/15/2016 by Dr. Sonido Hedrick; Protonix bid since avastin re-started. Colonoscopy on 08/29/2016 (to look at the cecal area) unremarkable. CEA 26.7 on 08/30/2016. Cycle # 4 FOLFIRI/Avastin was on 09/13/2016. CEA 23.4 on 09/13/2016. Cycle # 5 FOLFIRI/Avastin was on 09/27/2016. CEA 22.3 on 09/27/2016. Cycle # 6 FOLFIRI/Avastin was on 10/11/2016. CEA 18.4 on 10/11/2016.      Bone scan 10/21/2016 stable bone metastasis; ?  New lesion anterior left sixth rib likely interval healing fracture. CT abdomen/pelvis revealed stable hepatic metastasis. CT chest negative for metastatic disease. Continue FOLFIRI/Avastin and repeat scans in 3 months. Cycle # 7 FOLFIRI/Avastin was on 10/25/2016. CEA 16.1 on 10/25/2016. Cycle # 8 FOLFIRI/Avastin was on 11/08/2016. CEA 15.7 on 11/08/2016. Cycle # 9 FOLFIRI/Avastin was on 11/29/2016. CEA 13.6 on 11/29/2016. Cycle # 10 FOLFIRI/Avastin was on 12/13/2016. CEA 10.6 on 12/13/2016. Cycle # 11 FOLFIRI/Avastin was on 12/27/2016. CEA 11.1 on 12/27/2016. Cycle # 12 FOLFIRI/Avastin was on 01/10/2017. CEA 9.7 on 01/10/2017. CT chest 01/23/2017 No new pulmonary nodule or lymphadenopathy. Patchy groundglass opacities within the left lower lobe, suggestive of infectious or inflammatory etiology. Followup CT chest in 3 months. Slight increased linear sclerosis along the left anterior sixth rib corresponding to an area of increased uptake on the prior bone scan from 10/21/2016, favored to be related to interval healing changes of a nondisplaced fracture. CT abdomen/pelvis on 01/23/2017 Redemonstration of multiple hepatic metastases, which are similar compared to the prior CT from 10/21/2016. Redemonstration of area of increased sclerosis along the right acetabulum suspicious for metastatic disease. Bone scan on 01/23/2017 Stable subtle uptake within the left proximal diaphysis, again suggestive of metastatic disease  Decreased subtle uptake within the medial right acetabulum. Decreased uptake within the left anterior sixth rib corresponding to linear sclerosis on the recent CT, favored to be related to an joint rib fracture. Continue FOLFIRI + Avastin and repeat scans in 3 months. Cycle # 13 FOLFIRI + Avastin was on 01/24/2017. Cycle # 14 FOLFIRI + Avastin was on 02/07/2017. Cycle # 15 FOLFIRI + Avastin was on 02/21/2017. Cycle # 16 FOLFIRI + Avastin was on 03/07/2017.   Cycle # 17 FOLFIRI + Avastin was on 03/21/2017. Cycle # 18 FOLFIRI + Avastin was on 04/04/2017. CT chest 04/17/2017 negative for metastatic disease. CT abdomen/pelvis 04/17/2017 Stable hypodense metastatic lesions within the liver. Stable area of increased sclerosis along the right acetabulum  Bone scan 04/17/2017 Stable subtle uptake within the left proximal femoral diaphysis; No definite abnormal uptake in the region of a sclerotic lesion along the posterior column of the right acetabulum noted on CT. Stable slight uptake within the left anterior sixth rib corresponding to linear sclerosis on the recent CT, favored to be related to a fracture. Continue FOLFIRI + Avastin and repeat scans in 3 months. Cycle # 19 FOLFIRI + Avastin was on 04/18/2017. CEA 7.5 on 04/18/2017. Cycle # 20 FOLFIRI + Avastin was on 05/02/2017. CEA 7.7 on 05/02/2017. Cycle # 21 FOLFIRI + Avastin was on 05/16/2017. CEA 7.1 on 05/16/2017. Cycle # 22 FOLFIRI + Avastin was on 05/30/2017. CEA 8.2 on 05/30/2017. Cycle # 23 FOLFIRI + Avastin was on 06/13/2017. CEA 7.7 on 06/13/2017. Cycle # 24 FOLFIRI + Avastin was on 06/27/2017. CEA 6.6 on 06/27/2017. Re-staging scans 07/05/2017:  Bone scan stable proximal left femoral diaphysis, right acetabulum, and left anterior sixth rib lesions;  CT abdomen/pelvis stable hypodense metastatic lesions within the liver; CT chest without convincing evidence of metastatic disease. Cycle # 25 FOLFIRI + Avastin was on 07/11/2017. CEA 6.3 on 07/11/2017. Cycle # 26 FOLFIRI + Avastin was on 07/25/2017. CEA 7.3 on 07/25/2017. Cycle # 27 FOLFIRI + Avastin was on 08/08/2017. CEA 6.5 on 08/08/2017. Cycle # 28 FOLFIRI + Avastin was on 08/22/2017. CEA 5.9 on 08/22/2017. Cycle # 29 FOLFIRI + Avastin was on 09/05/2017. CEA 6.3 on 09/05/2017. Cycle # 30 FOLFIRI + Avastin was on 09/19/2017. CEA 6.3 on 09/19/2017. Bone scan 09/26/2017 stable. CT abdomen/pelvis on 09/26/2017 Stable hypodense mass in the liver.  Stable sclerotic lesion in the acetabulum. CT chest 09/26/2017 negative for metastatic disease. Cycle # 31 FOLFIRI + Avastin was on 10/03/2017. CEA 7.4 on 10/03/2017. Cycle # 32 FOLFIRI + Avastin was on 10/17/2017. CEA 6.0 on 10/17/2017. Cycle # 33 FOLFIRI + Avastin was on 10/31/2017. CEA 6.8 on 10/31/2017. Cycle # 34 FOLFIRI + Avastin was on 11/14/2017. CEA 7.2 on 11/14/2017. Cycle # 35 FOLFIRI + Avastin was on 11/28/2017. CEA 5.1 on 11/28/2017. Cycle # 36 FOLFIRI + Avastin was on 12/12/2017. CEA 6.1 on 12/12/2017. Bone scan 12/22/2017 noted no evidence of metastatic disease to the axial or appendicular skeleton. CT chest 12/22/2017 noted no evidence of metastatic disease. CT abdomen/pelvis 12/22/2017 noted stable hypodense lesions in the liver. Continue FOLFIRI + Avastin and repeat scans in 3 months. Cycle # 37 FOLFIRI + Avastin was on 12/27/2018. CEA 6.7 on 12/27/2017. Cycle # 38 FOLFIRI + Avastin was on 01/09/2018. CEA 5.0 on 01/09/2018. Cycle # 39 FOLFIRI + Avastin was on 01/23/2018. CEA 6.5 on 01/23/2018. Cycle # 40 FOLFIRI + Avastin was on 02/06/2018. CEA 6.9 on 02/06/2018. Cycle # 41 FOLFIRI + Avastin was on 02/20/2018. CEA 6.4 on 02/20/2018. Cycle # 42 FOLFIRI + Avastin was on 03/06/2018. CEA 6.6 on 03/06/2018. Cycle # 43 FOLFIRI + Avastin was on 03/20/2018. CEA 5.7 on 03/20/2018. Bone scan on 03/26/2018 negative for metastatic disease. CT chest 03/26/2018 noted ? new 7 mm nodule in LUCY. CT abdomen/pelvis 03/26/2018 noted stable hypodense lesions in the liver. ? New small ascites in the abdomen. Patient wants to continue to monitor the lung finding and repeat scans in 2 months instead of changing the current chemotherapy regimen to oral Lonsurf. Cycle # 44 FOLFIRI + Avastin was on 04/03/2018. CEA 6.3 on 04/03/2018. Cycle # 45 FOLFIRI + Avastin was on 04/24/2018. CEA 5.3 on 04/24/2018. Cycle # 46 FOLFIRI + Avastin was on 05/08/2018. CEA 5.4 on 05/08/2018.    Cycle # 47 FOLFIRI + Avastin was on 05/22/2018. CEA 6.1 on 05/22/2018. CT chest 05/31/2018 noted stable nodule in LUCY. No evidence of worsening malignancy. CT abdomen/pelvis on 05/31/2018 noted stable liver metastasis. No evidence of worsening malignancy. Continue FOLFIRI + Avastin and repeat scans in 3 months. Cycle # 48 FOLFIRI + Avastin was on 06/06/2018. CEA 6.3 on 06/05/2018. Cycle # 49 FOLFIRI + Avastin was on 06/19/2018. CEA 6.1 on 06/19/2018. Cycle # 50 FOLFIRI + Avastin was on 07/03/2018. CEA 7.4 on 07/03/2018. Cycle # 51 FOLFIRI + Avastin was on 07/24/2018. CEA 6.7 on 07/24/2018. Cycle # 52 FOLFIRI + Avastin was on 08/14/2018. CEA 6.8 on 08/14/2018. Cycle # 53 FOLFIRI + Avastin was on 08/28/2018. CEA 6.5 on 08/27/2018. CT chest 09/06/2018 noted interval decrease in size of LUCY measuring up to 4 mm, suggestive of treatment response. No mediastinal or hilar LN  CT abdomen/pelvis 09/06/2018 Slight interval increase in size of a previously noted metastatic lesion within the right hepatic lobe. This may be related to differences in phase of enhancement compared to the most recent study from 5/31/2018, the lesion remains decreased in size compared to the more previous studies. No abdominal, retroperitoneal, or pelvic lymphadenopathy. Continue FOLFIRI + Avastin and repeat scans in 2 months. Cycle # 54 FOLFIRI + Avastin was on 09/11/2018. CEA 6.4 on 09/10/2018. Cycle # 55 FOLFIRI + Avastin was on 09/25/2018. CEA 6.4 on 09/25/2018. Cycle # 56 FOLFIRI + Avastin was on 10/09/2018. CEA 6.7 on 10/08/2018. Cycle # 57 FOLFIRI + Avastin was on 10/23/2018. CEA 7.2 on 10/22/2018. CT chest 11/02/2018 stable 3 mm nodule within LUCY. No new right and left lung nodule. No mediastinal or osseous lesion. CT abdomen/pelvis 11/02/2018 stable metastatic disease to liver. No new metastatic disease identified. No mesenteric or retroperitoneal LN. No gross colonic lesion identified.   Continue FOLFIRI + Avastin and repeat scans in 3 months. Cycle # 58 FOLFIRI + Avastin was on 11/06/2018. CEA 7.6 on 11/05/2018. Cycle # 59 FOLFIRI + Avastin was on 11/27/2018. CEA 6.9 on 11/26/2018. Cycle # 60 FOLFIRI + Avastin was on 12/11/2018. CEA 6.7 on 12/11/2018. Cycle # 61 FOLFIRI + Avastin was on 01/08/2019. CEA 5.6 on 01/07/2019. Cycle # 62 FOLFIRI + Avastin was on 01/29/2019. CEA 4.6 on 01/28/2019. Cycle # 63 FOLFIRI + Avastin was on 02/12/2019. CEA 4.3 on 02/11/2019. CT chest 02/21/2019 no evidence of metastatic disease. CT abdomen/pelvis 02/21/2019 stable hypodense liver lesions. No evidence of worsening metastatic disease. 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 Ordered MRI thoracic spine and referred to neurosurgery team.  Cycle # 64 FOLFIRI + Avastin was on 02/26/2019. CEA 4.7 on 02/25/2019. Cycle # 65 FOLFIRI + Avastin was on 03/12/2019. CEA 4.0 on 03/11/2019. Cycle # 66 FOLFIRI + Avastin was on 03/26/2019. CEA 4.7 on 03/25/2019. Cycle # 67 FOLFIRI + Avastin was on 04/09/2019. CEA 5.6 on 04/08/2019. Cycle # 68 FOLFIRI + Avastin was on 04/30/2019. CEA 4.9 on 04/29/2019. Cycle # 69 FOLFIRI + Avastin was on 05/14/2019. CEA 5.3 on 05/14/2019. CT chest 05/23/2019 stable small lung nodule with no evidence of metastatic disease. CT abdomen/pelvis 05/23/2019 Decrease conspicuity of the liver lesions. No new lesions seen. Stable splenomegaly. Continue FOLFIRI + Avastin and repeat scans in 3 months. Cycle # 70 FOLFIRI + Avastin was on 06/04/2019. CEA 4.8 on 06/03/2019. Cycle # 71 FOLFIRI + Avastin was on 06/18/2019. CEA 4.6 on 06/17/2019. Cycle # 72 FOLFIRI + Avastin was on 07/09/2019. CEA 4.3 on 07/08/2019. Cycle # 73 FOLFIRI + Avastin was on 07/30/2019. CEA 5.0 on 07/29/2019. Cycle # 74 FOLFIRI + Avastin was on 08/13/2019. CEA 5.0 on 08/12/2019.   CT chest 08/20/2019 negative  CT abdomen/pelvis 08/20/2019 Previous identified hepatic lesions are not visualized on the current exam.  Continue FOLFIRI 03/23/2020 by Dr. Rosanne aguero (records requested). Cycle # 87 FOLFIRI + Avastin was on 03/31/2020. CEA 4.1 on 03/30/2020. Cycle # 88 FOLFIRI + Avastin was on 04/21/2020. CEA 6.4 on 04/20/2020. Cycle # 89 FOLFIRI + Avastin was on 05/05/2020. CEA 5.8 on 05/04/2020. Cycle # 90 FOLFIRI + Avastin was on 06/02/2020. CEA 5.5 on 06/01/2020. Cycle # 91 FOLFIRI + Avastin was on 06/16/2020. CEA 5.6 on 06/15/2020. CT chest 06/23/2020 noted no metastatic disease in the chest.   CT abdomen/pelvis 06/23/2020 Stable small liver lesions measuring up to 5 mm since 2019.   22 mm round mass in segment 8 of the liver with central high density stable from December 2019), previously measuring up to 34 mm in 2017. No additional metastatic disease in the abdomen or pelvis. Small amount of ascites. Overall stable disease. Continue FOLFIRI + Avastin and repeat scans in 2-3 months. Cycle # 92 FOLFIRI + Avastin was on 07/07/2020. CEA 5.7 on 07/06/2020. Cycle # 93 FOLFIRI + Avastin was on 07/21/2020. CEA 5.9 on 07/20/2020. Cycle # 94 FOLFIRI + Avastin was on 08/04/2020. CEA 5.5 on 08/04/2020. Cycle # 95 FOLFIRI + Avastin was on 08/25/2020. CEA 6.1 on 08/24/2020. CT chest 9/2/2020 stable unremarkable enhanced CT of the thorax. There is no metastatic disease to the lungs. CT abdomen pelvis on 9/2/2020 stable 2.2 cm low attenuated lesion within the central aspect of the right hepatic lobe favored to represent treated metastatic disease. No new hepatic lesion is identified. Stable splenomegaly  There is no appreciable colonic lesion or stricture. Pericholecystic fluid of uncertain etiology. Laparoscopic cholecystectomy with normal cholangiogram 10/27/20  Gallbladder: Chronic cholecystitis and cholelithiasis.  Unremarkable regional lymph node. 11/13/2020; Seen by Dr. Izabella Love from General surgery team; cleared to re-start chemotherapy. Cycle # 96 FOLFIRI + Avastin was on 11/17/2020.  CEA 3.6 on 11/16/2020. Cycle # 97 FOLFIRI + Avastin was on 12/01/2020. CEA 3.5 on 11/30/2020. Cycle # 98 FOLFIRI + Avastin was on 12/15/2020. CEA 4.3 on 12/15/2020. Cycle # 99 FOLFIRI + Avastin was on 01/05/2021. CEA 4.7 on 01/04/2021. CT chest 01/13/2021 negative for metastatic disease. CT abdomen/pelvis 01/13/2021 Unchanged central hepatic lesion. No specific signs of abdominopelvic metastatic disease. Continue FOLFIRI + Avastin and repeat scans in 3 months. Cycle # 100 FOLFIRI + Avastin was on 01/19/2021. CEA 4.7 on 01/18/2021. Cycle # 101 FOLFIRI + Avastin was on 02/02/2021. CEA 5.2 on 02/01/2021. Cycle # 102 FOLFIRI + Avastin was on 02/16/2021. CEA 5.6 on 02/15/2021. Cycle # 103 FOLFIRI + Avastin was on 03/02/2021. Cycle # 104 FOLFIRI (20% dose reduced due to significant toxicity) + Avastin was on 03/16/2021. Cycle # 105 FOLFIRI (same dose as cycle # 104) + Avastin was on 03/30/2021. CEA 6.5 on 03/29/2021. Cycle # 106 FOLFIRI (same dose as cycle # 104) + Avastin was on 04/13/2021. CEA 6.0 on 04/12/2021  CT chest 04/20/2021 no evidence of metastatic disease. CT abdomen/pelvis 04/20/2021 No evidence of progressive metastatic disease. Stable 2 cm lesion in the right lobe of the liver, likely representing treated metastatic disease. Imaging reviewed. Continue FOLFIRI + Avastin and repeat scans in 3 months  Cycle # 107 FOLFIRI + Avastin was on 04/27/2021  Cycle # 108 FOLFIRI + Avastin was on 05/11/2021. CEA 6.4 on 05/10/2021. Cycle # 109 FOLFIRI (held Avastin due to upcoming surgery) on 05/25/2021. CEA 6.3 on 5/24/21  Cycle # 110 FOLFIRI (held Avastin due to upcoming surgery) on 06/08/2021. CEA 6.3 on 6/07/21. CEA 5.8 on 06/28/2021. Right inguinal/scrotol hernia repair on 07/13/2021 with Dr Sugar Gary with folely removal on 07/23/2021. He developed fever on 07/24/2021 and was brought to [facility] ER via EMS; Sepsis secondary to urinary tract infection;  Completed Abx  CT chest 08/13/2021 no sign of recurrent or metastatic disease. CT abdomen/pelvis 08/13/2021 unchanged hepatic lesions. Splenomegaly and secondary signs of portal venous hypertension  CEA 4.0 on 08/16/2021  CEA 4.1 on 08/30/2021  Cycle # 111 FOLFIRI today 08/31/2021.    Refer to HBP team for evaluation of liver lesions  F/U with GI Dr. Griselda Ramey for scope (scheduled on 09/20/2021)  RTC 2 weeks for possible Cycle # 112 FOLFIRI    MSI testing noted no mismatch repair protein loss of expression    8/31/2021  Vielka Hutson MD  Board Certified Medical Oncologist

## 2021-08-31 NOTE — PROGRESS NOTES
Patient presents to clinic today for Xgeva injection. Patient's labs monitored, specifically, Calcium level, to ensure no increased risk of hypocalcemia with administration of the medication. Patient's calcium level is 8.9 mg/dL. Patient received therapy and will continue to be monitored prior to each dose.     [facility] 8/31/2021 9:35 AM

## 2021-09-02 ENCOUNTER — HOSPITAL ENCOUNTER (OUTPATIENT)
Dept: INFUSION THERAPY | Age: 71
Discharge: HOME | End: 2021-09-02
Payer: MEDICARE

## 2021-09-02 DIAGNOSIS — Z51.11 ENCOUNTER FOR ANTINEOPLASTIC CHEMOTHERAPY: Primary | ICD-10-CM

## 2021-09-02 DIAGNOSIS — C20 RECTAL CANCER METASTASIZED TO LIVER (HCC): ICD-10-CM

## 2021-09-02 DIAGNOSIS — C78.7 RECTAL CANCER METASTASIZED TO LIVER (HCC): ICD-10-CM

## 2021-09-02 PROCEDURE — 6360000002 HC RX W HCPCS: Performed by: INTERNAL MEDICINE

## 2021-09-02 PROCEDURE — 96372 THER/PROPH/DIAG INJ SC/IM: CPT

## 2021-09-02 RX ADMIN — PEGFILGRASTIM-CBQV 6 MG: 6 INJECTION, SOLUTION SUBCUTANEOUS at 14:29

## 2021-09-07 ENCOUNTER — OFFICE VISIT (OUTPATIENT)
Dept: SURGERY | Age: 71
End: 2021-09-07
Payer: MEDICARE

## 2021-09-07 VITALS
RESPIRATION RATE: 18 BRPM | WEIGHT: 183 LBS | TEMPERATURE: 98.6 F | BODY MASS INDEX: 24.25 KG/M2 | SYSTOLIC BLOOD PRESSURE: 114 MMHG | DIASTOLIC BLOOD PRESSURE: 68 MMHG | HEIGHT: 73 IN | HEART RATE: 69 BPM

## 2021-09-07 DIAGNOSIS — C18.9 METASTATIC COLON CANCER TO LIVER (HCC): Primary | ICD-10-CM

## 2021-09-07 DIAGNOSIS — C78.7 METASTATIC COLON CANCER TO LIVER (HCC): Primary | ICD-10-CM

## 2021-09-07 PROCEDURE — 99204 OFFICE O/P NEW MOD 45 MIN: CPT | Performed by: TRANSPLANT SURGERY

## 2021-09-07 ASSESSMENT — ENCOUNTER SYMPTOMS
PHOTOPHOBIA: 0
BACK PAIN: 0
EYE PAIN: 0
BLOOD IN STOOL: 0
ABDOMINAL PAIN: 0
CONSTIPATION: 0
EYE DISCHARGE: 0
VOMITING: 0
DIARRHEA: 0
SHORTNESS OF BREATH: 0
NAUSEA: 0

## 2021-09-07 NOTE — PROGRESS NOTES
Hepatobiliary and Pancreatic Surgery Attending History and Physical    Patient's Name/Date of Birth: Zada Romberg /1950 (78 y.o.)    Date: September 7, 2021     CC: Metastatic Colon Cancer to the Liver    HPI:  Patient is a very pleasant 66year old male whom was diagnosed in 2015 with rectal cancer. He has been on FOLFOX and now FOLFIRI. His starting CEA was 4500. He has had multiple CT scans with metastatic disease in his liver. He denies any weight loss or abdominal pain. He has some discomfort with chemotherapy. He had a CT scan which showed a potential hemangioma in his liver.       Past Medical History:   Diagnosis Date    Arthritis     Cancer ([address]     colon    Depression     History of blood transfusion     [facility] [place]    Hyperlipidemia     Hypertension     Prolonged emergence from general anesthesia     Retention of urine        Past Surgical History:   Procedure Laterality Date    CHOLECYSTECTOMY, LAPAROSCOPIC N/A 10/27/2020    CHOLECYSTECTOMY LAPAROSCOPIC WITH IOC, ROBOT XI ASSISTED, POSS OPEN performed by Shyanne Callahan MD at [facility]  11/2/15    bx, rectal mass    FRACTURE SURGERY      HERNIA REPAIR Right 7/13/2021    OPEN RIGHT INGUINAL HERNIA REPAIR WITH MESH (CPT 12803) performed by [facility]ne Callahan MD at [facility] ARTHROSCOPY Right     LIVER BIOPSY  11/12/15    SKIN FULL GRAFT      on finger    TONSILLECTOMY Bilateral     TUNNELED CENTRAL VENOUS CATHETER W/ SUBCUTANEOUS PORT Left        Current Outpatient Medications   Medication Sig Dispense Refill    hydrocortisone 2.5 % cream APPLY TO AFFECTED AREA, NOT TO FACE OR GENITAL AREAS, NOT TO EXCEED 4 WKS OF USE      Magic Mouthwash (MIRACLE MOUTHWASH) Swish and swallow 15 mLs 4 times daily as needed for Irritation Equal Amts of Benadryl,Maalox, and Xylocaine 300 mL 3    ferrous gluconate (FERGON) 324 (38 Fe) MG tablet Take 324 mg by mouth daily (with breakfast)      traMADol (ULTRAM) 50 MG tablet TAKE 1 TABLET BY MOUTH EVERY 4 TO 6 HOURS AS NEEDED FOR PAIN      tamsulosin (FLOMAX) 0.4 MG capsule Take 2 capsules by mouth daily (Patient taking differently: Take 0.4 mg by mouth 2 times daily ) 30 capsule 0    ondansetron (ZOFRAN) 4 MG tablet Take 1 tablet by mouth every 8 hours as needed for Nausea or Vomiting 40 tablet 1    lactulose (CHRONULAC) 10 GM/15ML solution Take 20 g by mouth as needed       furosemide (LASIX) 20 MG tablet Take 20 mg by mouth daily Indications: pt is taken this med 7 days a week for now       magnesium oxide (MAG-OX) 400 (240 Mg) MG tablet Take 1 tablet by mouth 2 times daily (Patient taking differently: Take 400 mg by mouth daily ) 30 tablet 0    potassium chloride (KLOR-CON M) 20 MEQ extended release tablet Take 40 mEq by mouth daily       loratadine (CLARITIN) 10 MG tablet Take 10 mg by mouth See Admin Instructions Takes the week of chemotherapy (Last dose: 5/9; Next dose: 5/26)      pantoprazole (PROTONIX) 40 MG tablet Take 40 mg by mouth daily      b complex vitamins capsule Take 1 capsule by mouth daily      diphenoxylate-atropine (LOMOTIL) 2.5-0.025 MG per tablet Take 1 tablet by mouth 4 times daily as needed for Diarrhea.  polyethylene glycol (GLYCOLAX) packet Take 17 g by mouth daily as needed for Constipation      hydrochlorothiazide (HYDRODIURIL) 25 MG tablet Take 1 tablet by mouth daily 30 tablet 3    hydrocortisone (ANUSOL-HC) 2.5 % rectal cream Use 3-4 times daily. Do not use internally. External use only. 1 Tube 2    acetaminophen (TYLENOL) 500 MG tablet Take 500 mg by mouth every 6 hours as needed for Pain       No current facility-administered medications for this visit.        Allergies   Allergen Reactions    Neosporin [Neomycin-Polymyxin-Gramicidin] Rash    Tape Ruth Ann Just Tape] Rash       Family History   Problem Relation Age of Onset    Cancer Mother         breast cancer    Cancer Father         malignant brain tumor    Diabetes Brother        Social History     Socioeconomic History    Marital status:      Spouse name: Not on file    Number of children: 1    Years of education: Not on file    Highest education level: Not on file   Occupational History    Occupation: Worked for [employer]     Employer: RETIRED   Tobacco Use    Smoking status: Former Smoker     Packs/day: 1.00     Years: 40.00     Pack years: 40.00     Types: Cigarettes     Quit date: 2001     Years since quittin.6    Smokeless tobacco: Never Used   Substance and Sexual Activity    Alcohol use: Not Currently     Comment: Quit 6 years ago    Drug use: No    Sexual activity: Not Currently   Other Topics Concern    Not on file   Social History Narrative    Lives with lui. Has a daughter. She resides in [place]. Social Determinants of Health     Financial Resource Strain:     Difficulty of Paying Living Expenses:    Food Insecurity:     Worried About Running Out of Food in the Last Year:     [address] in the Last Year:    Transportation Needs:     Lack of Transportation (Medical):  Lack of Transportation (Non-Medical):    Physical Activity:     Days of Exercise per Week:     Minutes of Exercise per Session:    Stress:     Feeling of Stress :    Social Connections:     Frequency of Communication with Friends and Family:     Frequency of Social Gatherings with Friends and Family:     Attends [employer] Services:     Active Member of Clubs or Organizations:     Attends Club or Organization Meetings:     Marital Status:    Intimate Partner Violence:     Fear of Current or Ex-Partner:     Emotionally Abused:     Physically Abused:     Sexually Abused:        ROS:   Review of Systems   Constitutional: Negative for chills, diaphoresis and fever. HENT: Negative for congestion, ear discharge, ear pain, hearing loss, nosebleeds and tinnitus. Eyes: Negative for photophobia, pain and discharge. Respiratory: Negative for shortness of breath. Cardiovascular: Negative for palpitations and leg swelling. Gastrointestinal: Negative for abdominal pain, blood in stool, constipation, diarrhea, nausea and vomiting. Endocrine: Negative for polydipsia. Genitourinary: Negative for frequency, hematuria and urgency. Musculoskeletal: Negative for back pain and neck pain. Skin: Negative for rash. Allergic/Immunologic: Negative for environmental allergies. Neurological: Negative for tremors and seizures. Psychiatric/Behavioral: Negative for hallucinations and suicidal ideas. The patient is not nervous/anxious. Physical Exam:  /68 (Site: Left Upper Arm, Position: Sitting, Cuff Size: Medium Adult)   Pulse 69   Temp 98.6 °F (37 °C) (Temporal)   Resp 18   Ht 6' 1\" (1.854 m)   Wt 183 lb (83 kg)   BMI 24.14 kg/m²     PSYCH: mood and affect normal, alert and oriented x 3: No apparent distress, comfortable  EYES: Sclera white, pupils equal round and reactive to light  ENMT:  Hearing normal, trachea midline, ears externally intact  LYMPH: no obvious lympadenopathy in neck. RESP: Respiratory effort was normal with no retractions or use of accessory muscles. CV:  No pedal edema  GI/ Abdomen: Soft, nondistended, nontender, no guarding, no peritoneal signs  MSK: no clubbing/ no cyanosis/ gaitnormal       Assessment/Plan:  Metastatic colon cancer to the liver  - I reviewed their images prior to our office visit and we also reviewed them together today. - His lesion does appear to be a hemangioma as his prior metastatic deposits in his liver were hypo attenuating compared to this CT which is hyper attenuating.  - compared to his prior CT scans he has disappearing liver metastasis. - will plan for a MRI with a body radiologist to read    45 Minutes of which greater than 50% was spent counseling or coordinating his care.     Thank you for the consultation allowing me to take part in Mr Jack Gomez care.     Please send a copy of my note to Dr. Faiza Hugo M.D.  9/7/2021  12:53 PM

## 2021-09-07 NOTE — LETTER
[facility]  [address] [place] [address]  Phone: [phone]  Fax: [phone]           Gene Martinez MD      September 7, 2021     Patient: Cathryn Brumfield   MR Number: <[id number]>   YOB: 1950   Date of Visit: 9/7/2021       Dear Dr. Addison Bamberger: Thank you for referring Sohan Villarreal to me for evaluation/treatment. Below are the relevant portions of my assessment and plan of care. Assessment/Plan:  Metastatic colon cancer to the liver  - I reviewed their images prior to our office visit and we also reviewed them together today. - His lesion does appear to be a hemangioma as his prior metastatic deposits in his liver were hypo attenuating compared to this CT which is hyper attenuating.  - compared to his prior CT scans he has disappearing liver metastasis. - will plan for a MRI with a body radiologist to read    45 Minutes of which greater than 50% was spent counseling or coordinating his care. Thank you for the consultation allowing me to take part in Mr Orin Macedo care. Please send a copy of my note to Dr. Piedad Trevino M.D.  9/7/2021  12:53 PM    If you have questions, please do not hesitate to call me. I look forward to following Nawaf Green along with you.     Sincerely,        Gene Martinez MD    CC providers:  Akbar Ames [phone]  [address]

## 2021-09-08 ENCOUNTER — TELEPHONE (OUTPATIENT)
Dept: HEMATOLOGY | Age: 71
End: 2021-09-08

## 2021-09-08 NOTE — TELEPHONE ENCOUNTER
I called [facility] and spoke to nurse reviewer Efrain Kelly and obtained [facility]pt [facility] 54434 with [facility] #[id number] with approval dates 9/8/21-3/7/22. I called and scheduled patient for his MRI on 9/17/21 at [facility] arrival time of 8:15am.  I called patient and gave him this appt information and instructed him to wear no metal or jewelry, enter [employer] and bring photo ID and insurance card with him. I did make him a follow up appt on 9/21/21 at 2:00pm at the [place] office. He verbalized understanding and confirmed these appts.     Electronically signed by Camron Gee RN on 9/8/2021 at 9:53 AM

## 2021-09-13 ENCOUNTER — HOSPITAL ENCOUNTER (OUTPATIENT)
Age: 71
Discharge: HOME | End: 2021-09-13
Payer: MEDICARE

## 2021-09-13 DIAGNOSIS — C20 RECTAL CANCER METASTASIZED TO LIVER (HCC): ICD-10-CM

## 2021-09-13 DIAGNOSIS — C78.7 RECTAL CANCER METASTASIZED TO LIVER (HCC): ICD-10-CM

## 2021-09-13 LAB
ALBUMIN SERPL-MCNC: 3.7 G/DL (ref 3.5–5.2)
ALP BLD-CCNC: 161 U/L (ref 40–129)
ALT SERPL-CCNC: 18 U/L (ref 0–40)
ANION GAP SERPL CALCULATED.3IONS-SCNC: 9 MMOL/L (ref 7–16)
ANISOCYTOSIS: ABNORMAL
AST SERPL-CCNC: 24 U/L (ref 0–39)
BASOPHILS ABSOLUTE: 0.06 E9/L (ref 0–0.2)
BASOPHILS RELATIVE PERCENT: 1.7 % (ref 0–2)
BILIRUB SERPL-MCNC: 0.7 MG/DL (ref 0–1.2)
BUN BLDV-MCNC: 22 MG/DL (ref 6–23)
BURR CELLS: ABNORMAL
CALCIUM SERPL-MCNC: 9.7 MG/DL (ref 8.6–10.2)
CEA: 3.7 NG/ML (ref 0–5.2)
CHLORIDE BLD-SCNC: 105 MMOL/L (ref 98–107)
CO2: 27 MMOL/L (ref 22–29)
CREAT SERPL-MCNC: 1.2 MG/DL (ref 0.7–1.2)
EOSINOPHILS ABSOLUTE: 0.03 E9/L (ref 0.05–0.5)
EOSINOPHILS RELATIVE PERCENT: 0.9 % (ref 0–6)
GFR AFRICAN AMERICAN: >60
GFR NON-AFRICAN AMERICAN: 59 ML/MIN/1.73
GLUCOSE BLD-MCNC: 126 MG/DL (ref 74–99)
HCT VFR BLD CALC: 29 % (ref 37–54)
HEMOGLOBIN: 9.2 G/DL (ref 12.5–16.5)
LYMPHOCYTES ABSOLUTE: 0.46 E9/L (ref 1.5–4)
LYMPHOCYTES RELATIVE PERCENT: 12.2 % (ref 20–42)
MCH RBC QN AUTO: 30.3 PG (ref 26–35)
MCHC RBC AUTO-ENTMCNC: 31.7 % (ref 32–34.5)
MCV RBC AUTO: 95.4 FL (ref 80–99.9)
MONOCYTES ABSOLUTE: 0.15 E9/L (ref 0.1–0.95)
MONOCYTES RELATIVE PERCENT: 3.5 % (ref 2–12)
NEUTROPHILS ABSOLUTE: 3.12 E9/L (ref 1.8–7.3)
NEUTROPHILS RELATIVE PERCENT: 81.7 % (ref 43–80)
OVALOCYTES: ABNORMAL
PDW BLD-RTO: 16.1 FL (ref 11.5–15)
PLATELET # BLD: 83 E9/L (ref 130–450)
PLATELET CONFIRMATION: NORMAL
PMV BLD AUTO: 9.8 FL (ref 7–12)
POIKILOCYTES: ABNORMAL
POLYCHROMASIA: ABNORMAL
POTASSIUM SERPL-SCNC: 4 MMOL/L (ref 3.5–5)
RBC # BLD: 3.04 E12/L (ref 3.8–5.8)
SODIUM BLD-SCNC: 141 MMOL/L (ref 132–146)
TEAR DROP CELLS: ABNORMAL
TOTAL PROTEIN: 6.9 G/DL (ref 6.4–8.3)
TOXIC GRANULATION: ABNORMAL
WBC # BLD: 3.8 E9/L (ref 4.5–11.5)

## 2021-09-13 PROCEDURE — 82378 CARCINOEMBRYONIC ANTIGEN: CPT

## 2021-09-13 PROCEDURE — 36415 COLL VENOUS BLD VENIPUNCTURE: CPT

## 2021-09-13 PROCEDURE — 80053 COMPREHEN METABOLIC PANEL: CPT

## 2021-09-13 PROCEDURE — 85025 COMPLETE CBC W/AUTO DIFF WBC: CPT

## 2021-09-14 ENCOUNTER — OFFICE VISIT (OUTPATIENT)
Dept: ONCOLOGY | Age: 71
End: 2021-09-14
Payer: MEDICARE

## 2021-09-14 ENCOUNTER — HOSPITAL ENCOUNTER (OUTPATIENT)
Dept: INFUSION THERAPY | Age: 71
Discharge: HOME | End: 2021-09-14
Payer: MEDICARE

## 2021-09-14 VITALS
OXYGEN SATURATION: 100 % | TEMPERATURE: 99.1 F | HEIGHT: 73 IN | BODY MASS INDEX: 24.39 KG/M2 | SYSTOLIC BLOOD PRESSURE: 128 MMHG | HEART RATE: 64 BPM | DIASTOLIC BLOOD PRESSURE: 72 MMHG | WEIGHT: 184 LBS

## 2021-09-14 DIAGNOSIS — C20 RECTAL CANCER (HCC): Primary | ICD-10-CM

## 2021-09-14 PROCEDURE — 99212 OFFICE O/P EST SF 10 MIN: CPT

## 2021-09-14 PROCEDURE — 99214 OFFICE O/P EST MOD 30 MIN: CPT | Performed by: INTERNAL MEDICINE

## 2021-09-14 NOTE — PROGRESS NOTES
[place] [id number]  Attending Clinic Note     Reason for Visit: Follow-up on a patient with Metastatic Rectal Cancer.     PCP: Gricel Mcgregor MD     History of Present Illness:  67 y/o  male who was referred to see Dr. Corinne Tariq (GI team) for evaluation of bright red blood per rectum, mild anemia and change in bowel habits with diarrhea. CEA 2640 on 10/19/2015. AlcP 299 AST 57 ALT 75 on 10/19/2015. Colonoscopy in 2013 noted no significant polyps, colitis or lesions at that time. Denies any Family History of colorectal cancer or polyps.     Colonoscopy on 10/19/2015 revealed:  1. Ascending polyp, 8 mm, hot snare: Tubulovillous adenoma. 2. Transverse polyp, 1 cm, hot snare: Serrated polyp most consistent with sessile serrated adenoma. 3. Five splenic flexure polyps, three - 5 mm, 7 mm, 8 mm, hot snare and biopsy: Four segments of Tubular Adenoma  4. Descending polyp, 5 mm, biopsy: Tubular adenoma. 5. Sigmoid polyp, 4 mm biopsy, Serrated polyp most consistent with sessile serrated adenoma. 6. Two rectal polyps, 4 mm biopsy: Serrated polyp most consistent with hyperplastic polyp. 7. Rectosigmoid colon mass (large mass approximately 65% circumference of the lumen; Very friable, firm and hard): Tubulovillous adenoma with associated focal erosion and fibroplasia.      CT scan abdomen/pelvis on 10/26/2015:  1. Small nodules at lung bases likely represent metastatic colon   cancer. 2. Extensive likely metastatic colon cancer throughout the liver.      3. Mild mural thickening and luminal narrowing in the   terminal ileum, otherwise nonspecific.      Colonoscopy with snare removal rectal mass was performed by Dr. Valere Severance. Pathology proved:  Rectal polyp: Invasive adenocarcinoma involving villous adenoma and extending to the cauterized edge of excision. KRAS Mutation: Mutation detected. BRAF Mutation: Mutation not detected.   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     CEA 3488. Bone scan on 11/10/2015 noted no metastatic disease. CT chest on 11/10/2015 revealed Multiple pulmonary nodules in the upper and lower lobes consistent with metastatic disease;   Hepatic metastasis also visualized. 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 Mediport was placed. Cycle # 1 of FOLFOX + Avastin was on 11/23/2015. CEA was 4555 on 11/23/2015. Cycle # 2 of FOLFOX + Avastin was on 12/08/2015. CEA was 3160 on 12/08/2015. Cycle # 3 of FOLFOX + Avastin was on 12/22/2015. CEA was 2516 on 12/21/2015. Cycle # 4 of FOLFOX + Avastin was on 01/05/2016. CEA was 2098 on 01/05/2015. Cycle # 5 of FOLFOX + Avastin was on 01/19/2016. CEA was 1511 on 01/05/2015.     -Bone scan on 01/26/2016 noted no metastatic disease. CT chest on 01/26/2016 revealed Significant response to treatment with no visible residual nodules. CT scan abdomen/pelvis on 01/26/2016 noted Interval decreased size of multiple masses in the liver compatible with treatment response. Continue another 2 months of FOLFOX + Avastin and repeat scans. Cycle # 6 of FOLFOX + Avastin was on 02/02/2016.  on 02/02/2016. Cycle # 7 of FOLFOX + Avastin was on 02/16/2016.  on 02/16/2016. Cycle # 8 of FOLFOX + Avastin was on 03/01/2016.  on 03/01/2016. Cycle # 9 of FOLFOX + Avastin was on 03/15/2016.  on 03/15/2016. Cycle # 10 of FOLFOX + Avastin was on 03/29/2016. .4 on 03/29/2016. Cycle # 11 of FOLFOX + Avastin was on 04/12/2016. .4 on 04/12/2016.     Re-staging scans on 04/19/2016: CT Chest: clear lungs; no evidence of recurring pulmonary nodule; CT Abdomen/Pelvis: Further interval decrease in size of the multiple metastatic hepatic lesions, the largest lesion now measures 3.9 x 3.5 cm and previously measured 4.5 cm in maximum diameter.  No mesenteric lymphadenopathy is identified; Bone Scan: No evidence of osseous metastasis. Continue same regimen and re-stage in 2-3 months. Cycle # 12 FOLFOX + Avastin was on 04/26/2016. .5 on 04/26/2016. Cycle # 13 FOLFOX + Avastin was on 05/10/2016. .3 on 05/10/2016. Cycle # 14 FOLFOX+AVASTIN was on 05/24/2016. .5 on 05/24/2016. Cycle # 15 FOLFOX + Avastin was on 06/07/2016. CEA 90.5 on 06/07/2016. Admitted to [facility] 06/13/2016-06/16/2016 for abdominal pain: EGD noted 1.5 cm clean based duodenal bulb ulceration s/p epinephrine and bicap per Dr. Khan Case. No active bleeding. A. Stomach, biopsy: Mild chronic gastritis, immunostain negative for Helicobacter  B. Esophagus, biopsy: Gastric glandular mucosa with prominent intestinal metaplasia (Mad[place]'s epithelium), negative for epithelial dysplasia, esophageal squamous mucosa not identified  Cycle # 16 FOLFOX (discontinued avastin (bevacizumab) given association of peptic ulcer disease and known association of GI perforation) was on 06/21/2016. Cycle # 17 FOLFOX was on 07/05/2016. CEA 52.6 on 07/19/2016. Cycle # 17 FOLFOX was on 07/05/2016. CEA 52.6 on 07/05/2016. CEA 47.6 on 07/19/2016.     Re-staging scans 07/19/2106: CT Chest negative for metastatic disease. CT Abdomen/Pelvis: Stable hepatic lesions; Question new mural thickening in the cecum. Bone Scan: New Let hip lesion suspicious for bone metastasis.     Increased CEA; new mural thickening in the cecum and new left hip lesion suspicious for bone metastasis are consistent with disease progression; He derived maximum benefit from FOLFOX/AVASTIN. D/C FOLFOX/AVASTIN. We recommended FOLFIRI second line therapy. Cycle # 1 FOLFIRI was on 08/02/2016. CEA 40.8 on 08/02/2016. Xgeva q4 weeks started on 08/02/2016. Cycle # 2 FOLFIRI was on 08/16/2016. CEA 31.7 on 08/16/2016. Cycle # 3 FOLFIRI (added Avastin) was on 08/30/2016 given that ulcers healed on EGD 08/15/2016 by Dr. Hemant Carey; Protonix bid since avastin re-started.    Colonoscopy on 08/29/2016 (to look at the cecal area) unremarkable. CEA 26.7 on 08/30/2016. Cycle # 4 FOLFIRI/Avastin was on 09/13/2016. CEA 23.4 on 09/13/2016. Cycle # 5 FOLFIRI/Avastin was on 09/27/2016. CEA 22.3 on 09/27/2016. Cycle # 6 FOLFIRI/Avastin was on 10/11/2016. CEA 18.4 on 10/11/2016.      Bone scan 10/21/2016 stable bone metastasis; ? New lesion anterior left sixth rib likely interval healing fracture. CT abdomen/pelvis revealed stable hepatic metastasis. CT chest negative for metastatic disease. Continue FOLFIRI/Avastin and repeat scans in 3 months. Cycle # 7 FOLFIRI/Avastin was on 10/25/2016. CEA 16.1  Cycle # 8 FOLFIRI/Avastin was on 11/08/2016. CEA 15.7  Cycle # 9 FOLFIRI/Avastin was on 11/29/2016. CEA 13.6 on 11/29/2016. Cycle # 10 FOLFIRI/Avastin was on 12/13/2016. CEA 10.6 on 12/13/2016. Cycle # 11 FOLFIRI/Avastin was on 12/27/2016. CEA 11.1 on 12/27/2016. Cycle # 12 FOLFIRI/Avastin was on 01/10/2017. CEA 9.7 on 01/10/2017.       CT chest 01/23/2017 No new pulmonary nodule or lymphadenopathy. Patchy groundglass opacities within the left lower lobe, suggestive of infectious or inflammatory etiology. Followup CT chest in 3 months. Slight increased linear sclerosis along the left anterior sixth rib corresponding to an area of increased uptake on the prior bone scan from 10/21/2016, favored to be related to interval healing changes of a nondisplaced fracture. CT abdomen/pelvis on 01/23/2017 Redemonstration of multiple hepatic metastases, which are similar compared to the prior CT from 10/21/2016. Redemonstration of area of increased sclerosis along the right acetabulum suspicious for metastatic disease. Bone scan on 01/23/2017 Stable subtle uptake within the left proximal diaphysis, again suggestive of metastatic disease  Decreased subtle uptake within the medial right acetabulum.    Decreased uptake within the left anterior sixth rib corresponding to linear sclerosis on the recent CT, favored to be related to an FOLFIRI + Avastin was on 10/31/2017. CEA 6.8 on 10/31/2017. Cycle # 34 FOLFIRI + Avastin was on 11/14/2017. CEA 7.2 on 11/14/2017. Cycle # 35 FOLFIRI + Avastin was on 11/28/2017. CEA 5.1 on 11/28/2017. Cycle # 36 FOLFIRI + Avastin was on 12/12/2017. CEA 6.1 on 12/12/2017. Bone scan 12/22/2017 noted no evidence of metastatic disease to the axial or appendicular skeleton. CT chest 12/22/2017 noted no evidence of metastatic disease. CT abdomen/pelvis 12/22/2017 noted stable hypodense lesions in the liver. Continue FOLFIRI + Avastin and repeat scans in 3 months. Cycle # 37 FOLFIRI + Avastin was on 12/27/2018. Cycle # 38 FOLFIRI + Avastin was on 01/09/2018. Cycle # 39 FOLFIRI + Avastin was on 01/23/2018. Cycle # 40 FOLFIRI + Avastin was on 02/06/2018. Cycle # 41 FOLFIRI + Avastin was on 02/20/2018. Cycle # 42 FOLFIRI + Avastin was on 03/06/2018. Cycle # 43 FOLFIRI + Avastin was on 03/20/2018. Bone scan on 03/26/2018 negative for metastatic disease. CT chest 03/26/2018 noted ? new 7 mm nodule in LUCY. CT abdomen/pelvis 03/26/2018 noted stable hypodense lesions in the liver. ? New small ascites in the abdomen. Patient wants to continue to monitor the lung finding and repeat scans in 2 months instead of changing the current regimen into oral Lonsurf. Cycle # 44 FOLFIRI + Avastin was on 04/03/2018. CEA 6.3 on 04/03/2018. Cycle # 45 FOLFIRI + Avastin was on 04/24/2018. CEA 5.3 on 04/24/2018. Cycle # 46 FOLFIRI + Avastin was on 05/08/2018. CEA 5.4 on 05/08/2018. Cycle # 47 FOLFIRI + Avastin was on 05/22/2018. CEA 6.1 on 05/22/2018. CT chest 05/31/2018 noted stable nodule in LUCY. No evidence of worsening malignancy. CT abdomen/pelvis on 05/31/2018 noted stable liver metastasis. No evidence of worsening malignancy. Continue FOLFIRI + Avastin and repeat scans in 3 months. Cycle # 48 FOLFIRI + Avastin was on 06/06/2018. Cycle # 49 FOLFIRI + Avastin was on 06/19/2018.    Cycle # 50 FOLFIRI + Avastin was on 07/03/2018. Cycle # 51 FOLFIRI + Avastin was on 07/24/2018. Cycle # 52 FOLFIRI + Avastin was on 08/14/2018. Cycle # 53 FOLFIRI + Avastin was on 08/28/2018. CT chest 09/06/2018 noted interval decrease in size of LUCY measuring up to 4 mm, suggestive of treatment response. No mediastinal or hilar LN  CT abdomen/pelvis 09/06/2018 Slight interval increase in size of a previously noted metastatic lesion within the right hepatic lobe. This may be related to differences in phase of enhancement compared to the most recent study from 5/31/2018, the lesion remains decreased in size compared to the more previous studies. No abdominal, retroperitoneal, or pelvic lymphadenopathy. Continue FOLFIRI + Avastin and repeat scans in 2 months. Cycle # 54 FOLFIRI + Avastin was on 09/11/2018. Cycle # 55 FOLFIRI + Avastin was on 09/25/2018. Cycle # 56 FOLFIRI + Avastin was on 10/09/2018. Cycle # 57 FOLFIRI + Avastin was on 10/23/2018. CT chest 11/02/2018 stable 3 mm nodule within LUCY. No new right and left lung nodule. No mediastinal or osseous lesion. CT abdomen/pelvis 11/02/2018 stable metastatic disease to liver. No new metastatic disease identified. No mesenteric or retroperitoneal LN. No gross colonic lesion identified. Continue FOLFIRI + Avastin and repeat scans in 3 months. Cycle # 58 FOLFIRI + Avastin was on 11/06/2018. CEA 7.6 on 11/05/2018. Cycle # 59 FOLFIRI + Avastin was on 11/27/2018. CEA 6.9 on 11/26/2018. Cycle # 60 FOLFIRI + Avastin was on 12/11/2018. CEA 6.7 on 12/11/2018. Cycle # 61 FOLFIRI + Avastin was on 01/08/2019. CEA 5.6 on 01/07/2019. Cycle # 62 FOLFIRI + Avastin was on 01/29/2019. CEA 4.6 on 01/28/2019. Cycle # 63 FOLFIRI + Avastin was on 02/12/2019. CEA 4.3 on 02/11/2019. CT chest 02/21/2019 no evidence of metastatic disease. CT abdomen/pelvis 02/21/2019 stable hypodense liver lesions. No evidence of worsening metastatic disease.  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 Ordered MRI thoracic spine and referred to neurosurgery team.    Cycle # 64 FOLFIRI + Avastin was on 02/26/2019. CEA 4.7 on 02/25/2019. Cycle # 65 FOLFIRI + Avastin was on 03/12/2019. CEA 4.0 on 03/11/2019. Cycle # 66 FOLFIRI + Avastin was on 03/26/2019. CEA 4.7 on 03/25/2019. Cycle # 67 FOLFIRI + Avastin was on 04/09/2019. CEA 5.6 on 04/08/2019. Cycle # 68 FOLFIRI + Avastin was on 04/30/2019. CEA 4.9 on 04/29/2019. Cycle # 69 FOLFIRI + Avastin was on 05/14/2019. CEA 5.3 on 05/14/2019. CT chest 05/23/2019 stable small lung nodule with no evidence of metastatic disease. CT abdomen/pelvis 05/23/2019 Decrease conspicuity of the liver lesions. No new lesions seen. Stable splenomegaly. Continue FOLFIRI + Avastin and repeat scans in 3 months. Cycle # 70 FOLFIRI + Avastin was on 06/04/2019. CEA 4.8 on 06/03/2019. Cycle # 71 FOLFIRI + Avastin was on 06/18/2019. CEA 4.6 on 06/17/2019. Cycle # 72 FOLFIRI + Avastin was on 07/09/2019. CEA 4.3 on 07/08/2019. Cycle # 73 FOLFIRI + Avastin was on 07/30/2019. CEA 5.0 on 07/29/2019. Cycle # 74 FOLFIRI + Avastin was on 08/13/2019. CEA 5.0 on 08/12/2019. CT chest 08/20/2019 negative  CT abdomen/pelvis 08/20/2019 Previous identified hepatic lesions are not visualized on the current exam.  Continue FOLFIRI + Avastin and repeat scans in 3 months. Cycle # 75 FOLFIRI + Avastin was on 08/27/2019. CEA 5.0 on 08/26/2019. Cycle # 76 FOLFIRI + Avastin was on 09/17/2019. CEA 5.5 on 09/16/2019. Cycle # 77 FOLFIRI + Avastin was on 10/15/2019. CEA 4.6 on 10/15/2019. Cycle # 78 FOLFIRI + Avastin was on 10/29/2019. CEA 4.1 on 10/28/2019. Cycle # 79 FOLFIRI + Avastin was on 11/12/2019. CEA 4.3 on 11/12/2019. Cycle # 80 FOLFIRI + Avastin was on 12/10/2019. CEA 4.0 on 12/09/2019.   CT chest 12/19/2019 Stable 3 mm nodule within the left upper lobe, similar to the previous studies dating back to 11/2/2018, however decreased in size compared to the CT from 3/26/2018. No thoracic LN. CT abdomen/pelvis 12/19/2019 Previously described small hypodense lesions are more conspicuous on the current study compared to the recent study throughout the liver from 8/20/2019, however similar to the prior study from 2/21/2019. Findings may be related to differences in contrast opacification. No abdominal or pelvic lymphadenopathy. Continue FOLFIRI + Avastin and repeat scans in 2 months to f/u on liver lesions. Cycle # 81 FOLFIRI + Avastin was on 01/07/2020. CEA 3.8 on 01/06/2020. Cycle # 82 FOLFIRI + Avastin was on 01/21/2020. CEA 3.7 on 01/20/2020. Cycle # 83 FOLFIRI + Avastin was on 02/04/2020. CEA 4.1 on 02/03/2020. Cycle # 84 FOLFIRI + Avastin was on 02/18/2020. CEA 4.6 on 02/17/2020. EGD by Dr. Sheree Lewis 03/02/2020 showing no masses or lesions  Cycle # 85 FOLFIRI + Avastin was on 03/03/2020. CEA 7.4 on 03/02/2020. Cycle # 86 FOLFIRI + Avastin was on 03/17/2020. CEA 4.5 on 03/16/2020. Colonoscopy on 03/23/2020 by Dr. Sheree aguero (records requested). Cycle # 87 FOLFIRI + Avastin was on 03/31/2020. CEA 4.1 on 03/30/2020. Cycle # 88 FOLFIRI + Avastin was on 04/21/2020. CEA 6.4 on 04/20/2020. Cycle # 89 FOLFIRI + Avastin was on 05/05/2020. CEA 5.8 on 05/04/2020. Cycle # 90 FOLFIRI + Avastin was on 06/02/2020. CEA 5.5 on 06/01/2020. Cycle # 91 FOLFIRI + Avastin was on 06/16/2020. CEA 5.6 on 06/15/2020. CT chest 06/23/2020 noted no metastatic disease in the chest.   CT abdomen/pelvis 06/23/2020 Stable small liver lesions measuring up to 5 mm since 2019.   22 mm round mass in segment 8 of the liver with central high density stable from December 2019), previously measuring up to 34 mm in 2017. No additional metastatic disease in the abdomen or pelvis. Small amount of ascites. Overall stable disease. Continue FOLFIRI + Avastin and repeat scans in 2-3 months. Cycle # 92 FOLFIRI + Avastin was on 07/07/2020. CEA 5.7 on 07/06/2020. Cycle # 93 FOLFIRI + Avastin was on 07/21/2020. CEA 5.9 on 07/20/2020. Cycle # 94 FOLFIRI + Avastin was on 08/04/2020. CEA 5.5 on 08/04/2020. Cycle # 95 FOLFIRI + Avastin was on 08/25/2020. CEA 6.1 on 08/24/2020. CT chest 9/2/2020 stable unremarkable enhanced CT of the thorax. There is no metastatic disease to the lungs. CT abdomen pelvis on 9/2/2020 stable 2.2 cm low attenuated lesion within the central aspect of the right hepatic lobe favored to represent treated metastatic disease. No new hepatic lesion is identified. Stable splenomegaly  There is no appreciable colonic lesion or stricture  Pericholecystic fluid of uncertain etiology. General surgery team consulted. Laparoscopic cholecystectomy with normal cholangiogram 10/27/20  Gallbladder: Chronic cholecystitis and cholelithiasis.  Unremarkable regional lymph node. 11/13/2020; Seen by Dr. Mike Gallardo from General surgery team; cleared to re-start chemotherapy. Cycle # 96 FOLFIRI + Avastin was on 11/17/2020. CEA 3.6 on 11/16/2020. Cycle # 97 FOLFIRI + Avastin was on 12/01/2020. CEA 3.5 on 11/30/2020. Cycle # 98 FOLFIRI + Avastin was on 12/15/2020. CEA 4.3 on 12/15/2020. Cycle # 99 FOLFIRI + Avastin was on 01/05/2021. CEA 4.7 on 01/04/2021. CT chest 01/13/2021 negative for metastatic disease. CT abdomen/pelvis 01/13/2021 Unchanged central hepatic lesion. No specific signs of abdominopelvic metastatic disease. Continue FOLFIRI + Avastin and repeat scans in 3 months. Cycle # 100 FOLFIRI + Avastin was on 01/19/2021. CEA 4.7 on 01/18/2021. Cycle # 101 FOLFIRI + Avastin was on 02/02/2021. CEA 5.2 on 02/01/2021. Cycle # 102 FOLFIRI + Avastin was on 02/16/2021. CEA 5.6 on 02/15/2021. Cycle # 103 FOLFIRI + Avastin was on 03/02/2021. Cycle # 104 FOLFIRI (20% dose reduced due to significant toxicity) + Avastin was on 03/16/2021. Cycle # 105 FOLFIRI (same dose as cycle # 104) + Avastin was on 03/30/2021. CEA 6.5 on 03/29/2021.   Cycle # 106 FOLFIRI (same dose as cycle # 104) + Avastin was on 04/13/2021. CEA 6.0 on 04/12/2021  CT chest 04/20/2021 no evidence of metastatic disease. CT abdomen/pelvis 04/20/2021 No evidence of progressive metastatic disease. Stable 2 cm lesion in the right lobe of the liver, likely representing treated metastatic disease. Imaging reviewed. Continue FOLFIRI + Avastin and repeat scans in 3 months  Cycle # 107 FOLFIRI + Avastin was on 04/27/2021  Cycle # 108 FOLFIRI + Avastin was on 05/11/2021. Cycle # 109 FOLFIRI (held Avastin due to upcoming surgery) on 05/25/2021. CEA 6.3 on 5/24/21  Cycle # 110 FOLFIRI (held Avastin due to upcoming surgery) on 06/08/2021. CEA 6.3 on 6/07/21. Right inguinal/scrotol hernia repair on 07/13/2021 with Dr Michael Vasquez with folely removal on Friday 07/23/2021. He developed fever on 07/24/2021 and was brought to [address] [place], [place] [address] ER via EMS; Sepsis secondary to urinary tract infection   Completed Abx  CEA 4.1 on 08/30/2021  Cycle # 111 FOLFIRI was on 08/31/2021. HBP team for evaluation of liver lesions; His lesion does appear to be a hemangioma as his prior metastatic deposits in his liver were hypo attenuating compared to this CT which is hyper attenuating. compared to his prior CT scans he has disappearing liver metastasis. will plan for a MRI Liver  F/U with GI Dr. Kaden Warren for scope (scheduled on 09/20/2021)  Today 09/14/2021 for f/u. No fever, chills. No nausea/vomiting. Fair appetite. Review of Systems;  CONSTITUTIONAL: No fever, chills. Fair appetite and energy level  ENMT: Eyes: No diplopia; Nose: No epistaxis. Mouth:No lesions  RESPIRATORY: No hemoptysis, shortness of breath. CARDIOVASCULAR: No chest pain, palpitations. GASTROINTESTINAL:No N/V.   GENITOURINARY: No dysuria, urinary frequency, hematuria. NEURO: No syncope, presyncope, headache. Remainder ROS: Negative.     Past Medical History:   Past Medical History               Diagnosis Date    Arthritis      Cancer [facility])         colon    Depression      Hyperlipidemia      Hypertension           Medications:  Reviewed and reconciled.     Allergies: Allergies   Allergen Reactions    Neosporin [Neomycin-Polymyxin-Gramicidin] Rash    Tape Loyce Isauro Tape] Rash      Physical Exam:  /72   Pulse 64   Temp 99.1 °F (37.3 °C)   Ht 6' 1\" (1.854 m)   Wt 184 lb (83.5 kg)   SpO2 100%   BMI 24.28 kg/m²   GENERAL: Alert, oriented x 3, not in distress  HEENT: PERRLA, EOMI. NECK: Supple. Without lymphadenopathy. LUNGS: Lungs are CTA bilaterally, with no wheezing, crackles or rhonchi. CARDIOVASCULAR: Regular rhythm. No murmurs, rubs or gallops. ABDOMEN: Soft. Non-tender, non-distended. Positive bowel sounds. hernia  EXTREMITIES: Without clubbing, cyanosis or edema. NEUROLOGIC: No focal deficits. ECOG PS 1    Diagnostics:  Lab Results   Component Value Date    WBC 3.8 (L) 09/13/2021    HGB 9.2 (L) 09/13/2021    HCT 29.0 (L) 09/13/2021    MCV 95.4 09/13/2021    PLT 83 (L) 09/13/2021     Lab Results   Component Value Date     09/13/2021    K 4.0 09/13/2021     09/13/2021    CO2 27 09/13/2021    BUN 22 09/13/2021    CREATININE 1.2 09/13/2021    GLUCOSE 126 (H) 09/13/2021    CALCIUM 9.7 09/13/2021    PROT 6.9 09/13/2021    LABALBU 3.7 09/13/2021    BILITOT 0.7 09/13/2021    ALKPHOS 161 (H) 09/13/2021    AST 24 09/13/2021    ALT 18 09/13/2021    LABGLOM 59 09/13/2021    GFRAA >60 09/13/2021     Lab Results   Component Value Date    CEA 3.7 09/13/2021     Impression/Plan:  65 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 KRAS Mutation: Mutation detected. BRAF Mutation: Mutation not detected. 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 CT scan abdomen/pelvis on 10/26/2015 revealed small nodules at the lung bases, extensive liver lesions consistent with metastatic rectosigmoid cancer. CEA 3488 on 10/30/2015. Bone scan on 11/10/2015 noted no metastatic disease.  CT chest on 11/10/2015 revealed Multiple pulmonary nodules in the upper and lower lobes consistent with metastatic disease; Hepatic metastasis also visualized. 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 Mediport was placed. Cycle # 1 of FOLFOX + Avastin was on 11/23/2015. CEA was 4555 on 11/23/2015. Cycle # 2 of FOLFOX + Avastin was on 12/08/2015. CEA was 3160 on 12/08/2015. Cycle # 3 of FOLFOX + Avastin was on 12/22/2015. CEA was 2516 on 12/21/2015. Cycle # 4 of FOLFOX + Avastin was on 01/05/2016. CEA was 2098 on 01/05/2015. Cycle # 5 of FOLFOX + Avastin was on 01/19/2016. CEA was 1511 on 01/05/2015.     -Bone scan on 01/26/2016 noted no metastatic disease. CT chest on 01/26/2016 revealed Significant response to treatment with no visible residual nodules. CT scan abdomen/pelvis on 01/26/2016 noted Interval decreased size of multiple masses in the liver compatible with treatment response. Continue another 2 months of FOLFOX + Avastin and repeat scans. Cycle # 6 of FOLFOX + Avastin was on 02/02/2016.  on 02/02/2016. Cycle # 7 of FOLFOX + Avastin was on 02/16/2016.  on 02/16/2016. Cycle # 8 of FOLFOX + Avastin was on 03/01/2016.  on 03/01/2016. Cycle # 9 of FOLFOX + Avastin was on 03/15/2016.  on 03/15/2016. Cycle # 10 of FOLFOX + Avastin was on 03/29/2016. .4 on 03/29/2016. Cycle # 11 of FOLFOX + Avastin was on 04/12/2016. .4 on 04/12/2016.     Re-staging scans on 04/19/2016: CT Chest: clear lungs; no evidence of recurring pulmonary nodule; CT Abdomen/Pelvis: Further interval decrease in size of the multiple metastatic hepatic lesions, the largest lesion now measures 3.9 x 3.5 cm and previously measured 4.5 cm in maximum diameter. No mesenteric lymphadenopathy is identified; Bone Scan: No evidence of osseous metastasis. Continue same regimen and re-stage in 2-3 months. Cycle # 12 FOLFOX + Avastin was on 04/26/2016.  CEA 183.5 on 04/26/2016. Cycle # 13 FOLFOX + Avastin was on 05/10/2016. .3 on 05/10/2016. Cycle # 14 FOLFOX+AVASTIN was on 05/24/2016. .5 on 05/24/2016. Cycle # 15 FOLFOX + Avastin was on 06/07/2016. CEA 90.5 on 06/07/2016. Admitted to [facility] 06/13/2016-06/16/2016 for abdominal pain: EGD noted 1.5 cm clean based duodenal bulb ulceration s/p epinephrine and bicap per Dr. Steve Lemon. No active bleeding. A. Stomach, biopsy: Mild chronic gastritis, immunostain negative for Helicobacter  B. Esophagus, biopsy: Gastric glandular mucosa with prominent ntestinal metaplasia ([place]'s epithelium), negative for epithelial dysplasia, esophageal squamous mucosa not identified     Cycle # 16 FOLFOX (discontinued avastin (bevacizumab) given association of peptic ulcer disease and known association of GI perforation.) was on 06/21/2016. CEA 47 on 06/21/2016. Cycle # 17 FOLFOX was on 07/05/2016. CEA 52.6 on 07/05/2016. CEA 47.6 on 07/19/2016.     Re-staging scans 07/19/2106: CT Chest negative for metastatic disease. CT Abdomen/Pelvis: Stable hepatic lesions; Question new mural thickening in the cecum. Bone Scan: New Let hip lesion suspicious for bone metastasis.     Increased CEA; new mural thickening in the cecum and new left hip lesion suspicious for bone metastasis are consistent with disease progression; He derived maximum benefit from FOLFOX/AVASTIN. D/C FOLFOX/AVASTIN. We recommended FOLFIRI second line therapy. Cycle # 1 FOLFIRI was on 08/02/2016. CEA 40.8 on 08/02/2016. Xgeva q4 weeks started on 08/02/2016. Cycle # 2 FOLFIRI was on 08/16/2016. CEA 31.7 on 08/16/2016. Cycle # 3 FOLFIRI (added Avastin) was on 08/30/2016 given that ulcers healed on EGD 08/15/2016 by Dr. Sheree Lewis; Protonix bid since avastin re-started. Colonoscopy on 08/29/2016 (to look at the cecal area) unremarkable. CEA 26.7 on 08/30/2016. Cycle # 4 FOLFIRI/Avastin was on 09/13/2016. CEA 23.4 on 09/13/2016.   Cycle # 5 FOLFIRI/Avastin was on 09/27/2016. CEA 22.3 on 09/27/2016. Cycle # 6 FOLFIRI/Avastin was on 10/11/2016. CEA 18.4 on 10/11/2016.      Bone scan 10/21/2016 stable bone metastasis; ? New lesion anterior left sixth rib likely interval healing fracture. CT abdomen/pelvis revealed stable hepatic metastasis. CT chest negative for metastatic disease. Continue FOLFIRI/Avastin and repeat scans in 3 months. Cycle # 7 FOLFIRI/Avastin was on 10/25/2016. CEA 16.1 on 10/25/2016. Cycle # 8 FOLFIRI/Avastin was on 11/08/2016. CEA 15.7 on 11/08/2016. Cycle # 9 FOLFIRI/Avastin was on 11/29/2016. CEA 13.6 on 11/29/2016. Cycle # 10 FOLFIRI/Avastin was on 12/13/2016. CEA 10.6 on 12/13/2016. Cycle # 11 FOLFIRI/Avastin was on 12/27/2016. CEA 11.1 on 12/27/2016. Cycle # 12 FOLFIRI/Avastin was on 01/10/2017. CEA 9.7 on 01/10/2017. CT chest 01/23/2017 No new pulmonary nodule or lymphadenopathy. Patchy groundglass opacities within the left lower lobe, suggestive of infectious or inflammatory etiology. Followup CT chest in 3 months. Slight increased linear sclerosis along the left anterior sixth rib corresponding to an area of increased uptake on the prior bone scan from 10/21/2016, favored to be related to interval healing changes of a nondisplaced fracture. CT abdomen/pelvis on 01/23/2017 Redemonstration of multiple hepatic metastases, which are similar compared to the prior CT from 10/21/2016. Redemonstration of area of increased sclerosis along the right acetabulum suspicious for metastatic disease. Bone scan on 01/23/2017 Stable subtle uptake within the left proximal diaphysis, again suggestive of metastatic disease  Decreased subtle uptake within the medial right acetabulum. Decreased uptake within the left anterior sixth rib corresponding to linear sclerosis on the recent CT, favored to be related to an joint rib fracture. Continue FOLFIRI + Avastin and repeat scans in 3 months.   Cycle # 13 FOLFIRI + Avastin was on 01/24/2017. Cycle # 14 FOLFIRI + Avastin was on 02/07/2017. Cycle # 15 FOLFIRI + Avastin was on 02/21/2017. Cycle # 16 FOLFIRI + Avastin was on 03/07/2017. Cycle # 17 FOLFIRI + Avastin was on 03/21/2017. Cycle # 18 FOLFIRI + Avastin was on 04/04/2017. CT chest 04/17/2017 negative for metastatic disease. CT abdomen/pelvis 04/17/2017 Stable hypodense metastatic lesions within the liver. Stable area of increased sclerosis along the right acetabulum  Bone scan 04/17/2017 Stable subtle uptake within the left proximal femoral diaphysis; No definite abnormal uptake in the region of a sclerotic lesion along the posterior column of the right acetabulum noted on CT. Stable slight uptake within the left anterior sixth rib corresponding to linear sclerosis on the recent CT, favored to be related to a fracture. Continue FOLFIRI + Avastin and repeat scans in 3 months. Cycle # 19 FOLFIRI + Avastin was on 04/18/2017. CEA 7.5 on 04/18/2017. Cycle # 20 FOLFIRI + Avastin was on 05/02/2017. CEA 7.7 on 05/02/2017. Cycle # 21 FOLFIRI + Avastin was on 05/16/2017. CEA 7.1 on 05/16/2017. Cycle # 22 FOLFIRI + Avastin was on 05/30/2017. CEA 8.2 on 05/30/2017. Cycle # 23 FOLFIRI + Avastin was on 06/13/2017. CEA 7.7 on 06/13/2017. Cycle # 24 FOLFIRI + Avastin was on 06/27/2017. CEA 6.6 on 06/27/2017. Re-staging scans 07/05/2017:  Bone scan stable proximal left femoral diaphysis, right acetabulum, and left anterior sixth rib lesions;  CT abdomen/pelvis stable hypodense metastatic lesions within the liver; CT chest without convincing evidence of metastatic disease. Cycle # 25 FOLFIRI + Avastin was on 07/11/2017. CEA 6.3 on 07/11/2017. Cycle # 26 FOLFIRI + Avastin was on 07/25/2017. CEA 7.3 on 07/25/2017. Cycle # 27 FOLFIRI + Avastin was on 08/08/2017. CEA 6.5 on 08/08/2017. Cycle # 28 FOLFIRI + Avastin was on 08/22/2017. CEA 5.9 on 08/22/2017. Cycle # 29 FOLFIRI + Avastin was on 09/05/2017.   CEA 6.3 on on 04/03/2018. Cycle # 45 FOLFIRI + Avastin was on 04/24/2018. CEA 5.3 on 04/24/2018. Cycle # 46 FOLFIRI + Avastin was on 05/08/2018. CEA 5.4 on 05/08/2018. Cycle # 47 FOLFIRI + Avastin was on 05/22/2018. CEA 6.1 on 05/22/2018. CT chest 05/31/2018 noted stable nodule in LUCY. No evidence of worsening malignancy. CT abdomen/pelvis on 05/31/2018 noted stable liver metastasis. No evidence of worsening malignancy. Continue FOLFIRI + Avastin and repeat scans in 3 months. Cycle # 48 FOLFIRI + Avastin was on 06/06/2018. CEA 6.3 on 06/05/2018. Cycle # 49 FOLFIRI + Avastin was on 06/19/2018. CEA 6.1 on 06/19/2018. Cycle # 50 FOLFIRI + Avastin was on 07/03/2018. CEA 7.4 on 07/03/2018. Cycle # 51 FOLFIRI + Avastin was on 07/24/2018. CEA 6.7 on 07/24/2018. Cycle # 52 FOLFIRI + Avastin was on 08/14/2018. CEA 6.8 on 08/14/2018. Cycle # 53 FOLFIRI + Avastin was on 08/28/2018. CEA 6.5 on 08/27/2018. CT chest 09/06/2018 noted interval decrease in size of LUCY measuring up to 4 mm, suggestive of treatment response. No mediastinal or hilar LN  CT abdomen/pelvis 09/06/2018 Slight interval increase in size of a previously noted metastatic lesion within the right hepatic lobe. This may be related to differences in phase of enhancement compared to the most recent study from 5/31/2018, the lesion remains decreased in size compared to the more previous studies. No abdominal, retroperitoneal, or pelvic lymphadenopathy. Continue FOLFIRI + Avastin and repeat scans in 2 months. Cycle # 54 FOLFIRI + Avastin was on 09/11/2018. CEA 6.4 on 09/10/2018. Cycle # 55 FOLFIRI + Avastin was on 09/25/2018. CEA 6.4 on 09/25/2018. Cycle # 56 FOLFIRI + Avastin was on 10/09/2018. CEA 6.7 on 10/08/2018. Cycle # 57 FOLFIRI + Avastin was on 10/23/2018. CEA 7.2 on 10/22/2018. CT chest 11/02/2018 stable 3 mm nodule within LUCY. No new right and left lung nodule. No mediastinal or osseous lesion.   CT abdomen/pelvis 11/02/2018 stable metastatic disease to liver. No new metastatic disease identified. No mesenteric or retroperitoneal LN. No gross colonic lesion identified. Continue FOLFIRI + Avastin and repeat scans in 3 months. Cycle # 58 FOLFIRI + Avastin was on 11/06/2018. CEA 7.6 on 11/05/2018. Cycle # 59 FOLFIRI + Avastin was on 11/27/2018. CEA 6.9 on 11/26/2018. Cycle # 60 FOLFIRI + Avastin was on 12/11/2018. CEA 6.7 on 12/11/2018. Cycle # 61 FOLFIRI + Avastin was on 01/08/2019. CEA 5.6 on 01/07/2019. Cycle # 62 FOLFIRI + Avastin was on 01/29/2019. CEA 4.6 on 01/28/2019. Cycle # 63 FOLFIRI + Avastin was on 02/12/2019. CEA 4.3 on 02/11/2019. CT chest 02/21/2019 no evidence of metastatic disease. CT abdomen/pelvis 02/21/2019 stable hypodense liver lesions. No evidence of worsening metastatic disease. 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 Ordered MRI thoracic spine and referred to neurosurgery team.  Cycle # 64 FOLFIRI + Avastin was on 02/26/2019. CEA 4.7 on 02/25/2019. Cycle # 65 FOLFIRI + Avastin was on 03/12/2019. CEA 4.0 on 03/11/2019. Cycle # 66 FOLFIRI + Avastin was on 03/26/2019. CEA 4.7 on 03/25/2019. Cycle # 67 FOLFIRI + Avastin was on 04/09/2019. CEA 5.6 on 04/08/2019. Cycle # 68 FOLFIRI + Avastin was on 04/30/2019. CEA 4.9 on 04/29/2019. Cycle # 69 FOLFIRI + Avastin was on 05/14/2019. CEA 5.3 on 05/14/2019. CT chest 05/23/2019 stable small lung nodule with no evidence of metastatic disease. CT abdomen/pelvis 05/23/2019 Decrease conspicuity of the liver lesions. No new lesions seen. Stable splenomegaly. Continue FOLFIRI + Avastin and repeat scans in 3 months. Cycle # 70 FOLFIRI + Avastin was on 06/04/2019. CEA 4.8 on 06/03/2019. Cycle # 71 FOLFIRI + Avastin was on 06/18/2019. CEA 4.6 on 06/17/2019. Cycle # 72 FOLFIRI + Avastin was on 07/09/2019. CEA 4.3 on 07/08/2019. Cycle # 73 FOLFIRI + Avastin was on 07/30/2019. CEA 5.0 on 07/29/2019.   Cycle # 74 FOLFIRI + Avastin was on 08/13/2019. CEA 5.0 on 08/12/2019. CT chest 08/20/2019 negative  CT abdomen/pelvis 08/20/2019 Previous identified hepatic lesions are not visualized on the current exam.  Continue FOLFIRI + Avastin and repeat scans in 3 months. Cycle # 75 FOLFIRI + Avastin was on 08/27/2019. CEA 5.0 on 08/26/2019. Cycle # 76 FOLFIRI + Avastin was on 09/17/2019. CEA 5.5 on 09/16/2019. Cycle # 77 FOLFIRI + Avastin was on 10/15/2019. CEA 4.6 on 10/15/2019. Cycle # 78 FOLFIRI + Avastin was on 10/29/2019. CEA 4.1 on 10/28/2019. Cycle # 79 FOLFIRI + Avastin was on 11/12/2019. CEA 4.3 on 11/12/2019. Cycle # 80 FOLFIRI + Avastin was on 12/10/2019. CEA 4.0 on 12/09/2019. CT chest 12/19/2019 Stable 3 mm nodule within the left upper lobe, similar to the previous studies dating back to 11/2/2018, however decreased in size compared to the CT from 3/26/2018. No thoracic LN. CT abdomen/pelvis 12/19/2019 Previously described small hypodense lesions are more conspicuous on the current study compared to the recent study throughout the liver from 8/20/2019, however similar to the prior study from 2/21/2019. Findings may be related to differences in contrast opacification. No abdominal or pelvic lymphadenopathy. Continue FOLFIRI + Avastin and repeat scans in 2 months to f/u on liver lesions. Cycle # 81 FOLFIRI + Avastin was on 01/07/2020. CEA 3.8 on 01/06/2020. Cycle # 82 FOLFIRI + Avastin was on 01/21/2020. CEA 3.7 on 01/20/2020. Cycle # 83 FOLFIRI + Avastin was on 02/04/2020. CEA 4.1 on 02/03/2020. Cycle # 84 FOLFIRI + Avastin was on 02/18/2020. CEA 4.6 on 02/17/2020. CT chest 2/28/2020 no evidence of metastatic disease to the lungs and no mediastinal or hilar adenopathy. CT abdomen pelvis 2/28/2020 no enhancing lesions seen within the liver to suggest metastatic disease. Wall thickening of the rectosigmoid junction. Images reviewed.  Continue FOLFIRI Avastin and repeat scans in 3 months  EGD by Dr. Tyra Parisi 03/02/2020 showing no masses or lesions. Cycle # 85 FOLFIRI + Avastin was on 03/03/2020. CEA 7.4 on 03/02/2020. Cycle # 86 FOLFIRI + Avastin was on 03/17/2020. CEA 4.5 on 03/16/2020. Colonoscopy on 03/23/2020 by Dr. Tejeda Angles unremarkable (records requested). Cycle # 87 FOLFIRI + Avastin was on 03/31/2020. CEA 4.1 on 03/30/2020. Cycle # 88 FOLFIRI + Avastin was on 04/21/2020. CEA 6.4 on 04/20/2020. Cycle # 89 FOLFIRI + Avastin was on 05/05/2020. CEA 5.8 on 05/04/2020. Cycle # 90 FOLFIRI + Avastin was on 06/02/2020. CEA 5.5 on 06/01/2020. Cycle # 91 FOLFIRI + Avastin was on 06/16/2020. CEA 5.6 on 06/15/2020. CT chest 06/23/2020 noted no metastatic disease in the chest.   CT abdomen/pelvis 06/23/2020 Stable small liver lesions measuring up to 5 mm since 2019.   22 mm round mass in segment 8 of the liver with central high density stable from December 2019), previously measuring up to 34 mm in 2017. No additional metastatic disease in the abdomen or pelvis. Small amount of ascites. Overall stable disease. Continue FOLFIRI + Avastin and repeat scans in 2-3 months. Cycle # 92 FOLFIRI + Avastin was on 07/07/2020. CEA 5.7 on 07/06/2020. Cycle # 93 FOLFIRI + Avastin was on 07/21/2020. CEA 5.9 on 07/20/2020. Cycle # 94 FOLFIRI + Avastin was on 08/04/2020. CEA 5.5 on 08/04/2020. Cycle # 95 FOLFIRI + Avastin was on 08/25/2020. CEA 6.1 on 08/24/2020. CT chest 9/2/2020 stable unremarkable enhanced CT of the thorax. There is no metastatic disease to the lungs. CT abdomen pelvis on 9/2/2020 stable 2.2 cm low attenuated lesion within the central aspect of the right hepatic lobe favored to represent treated metastatic disease. No new hepatic lesion is identified. Stable splenomegaly  There is no appreciable colonic lesion or stricture. Pericholecystic fluid of uncertain etiology. Laparoscopic cholecystectomy with normal cholangiogram 10/27/20  Gallbladder: Chronic cholecystitis and cholelithiasis.  Unremarkable regional lymph node. 11/13/2020; Seen by Dr. Bertha Mckeon from General surgery team; cleared to re-start chemotherapy. Cycle # 96 FOLFIRI + Avastin was on 11/17/2020. CEA 3.6 on 11/16/2020. Cycle # 97 FOLFIRI + Avastin was on 12/01/2020. CEA 3.5 on 11/30/2020. Cycle # 98 FOLFIRI + Avastin was on 12/15/2020. CEA 4.3 on 12/15/2020. Cycle # 99 FOLFIRI + Avastin was on 01/05/2021. CEA 4.7 on 01/04/2021. CT chest 01/13/2021 negative for metastatic disease. CT abdomen/pelvis 01/13/2021 Unchanged central hepatic lesion. No specific signs of abdominopelvic metastatic disease. Continue FOLFIRI + Avastin and repeat scans in 3 months. Cycle # 100 FOLFIRI + Avastin was on 01/19/2021. CEA 4.7 on 01/18/2021. Cycle # 101 FOLFIRI + Avastin was on 02/02/2021. CEA 5.2 on 02/01/2021. Cycle # 102 FOLFIRI + Avastin was on 02/16/2021. CEA 5.6 on 02/15/2021. Cycle # 103 FOLFIRI + Avastin was on 03/02/2021. Cycle # 104 FOLFIRI (20% dose reduced due to significant toxicity) + Avastin was on 03/16/2021. Cycle # 105 FOLFIRI (same dose as cycle # 104) + Avastin was on 03/30/2021. CEA 6.5 on 03/29/2021. Cycle # 106 FOLFIRI (same dose as cycle # 104) + Avastin was on 04/13/2021. CEA 6.0 on 04/12/2021  CT chest 04/20/2021 no evidence of metastatic disease. CT abdomen/pelvis 04/20/2021 No evidence of progressive metastatic disease. Stable 2 cm lesion in the right lobe of the liver, likely representing treated metastatic disease. Imaging reviewed. Continue FOLFIRI + Avastin and repeat scans in 3 months  Cycle # 107 FOLFIRI + Avastin was on 04/27/2021  Cycle # 108 FOLFIRI + Avastin was on 05/11/2021. CEA 6.4 on 05/10/2021. Cycle # 109 FOLFIRI (held Avastin due to upcoming surgery) on 05/25/2021. CEA 6.3 on 5/24/21  Cycle # 110 FOLFIRI (held Avastin due to upcoming surgery) on 06/08/2021. CEA 6.3 on 6/07/21. CEA 5.8 on 06/28/2021.      Right inguinal/scrotol hernia repair on 07/13/2021 with Dr Bertha Mckeon with folely removal on 07/23/2021. He developed fever on 07/24/2021 and was brought to [address] [place], [place] [address] ER via EMS; Sepsis secondary to urinary tract infection; Completed Abx  CT chest 08/13/2021 no sign of recurrent or metastatic disease. CT abdomen/pelvis 08/13/2021 unchanged hepatic lesions. Splenomegaly and secondary signs of portal venous hypertension  CEA 4.0 on 08/16/2021  CEA 4.1 on 08/30/2021  Cycle # 111 FOLFIRI was on 08/31/2021. HBP team for evaluation of liver lesions; His lesion does appear to be a hemangioma as his prior metastatic deposits in his liver were hypo attenuating compared to this CT which is hyper attenuating. compared to his prior CT scans he has disappearing liver metastasis. will plan for a MRI liver (scheduled on 09/17/2021)  F/U with GI Dr. Rosanne Chung for scope (scheduled on 09/20/2021)  CEA 3.7 on 09/13/2021.   RTC 2 weeks    MSI testing noted no mismatch repair protein loss of expression    9/14/2021  Jihan Henry MD  Board Certified Medical Oncologist

## 2021-09-17 ENCOUNTER — HOSPITAL ENCOUNTER (OUTPATIENT)
Dept: MRI IMAGING | Age: 71
Discharge: HOME | End: 2021-09-19
Payer: MEDICARE

## 2021-09-17 DIAGNOSIS — C78.7 METASTATIC COLON CANCER TO LIVER (HCC): ICD-10-CM

## 2021-09-17 DIAGNOSIS — C18.9 METASTATIC COLON CANCER TO LIVER (HCC): ICD-10-CM

## 2021-09-17 PROCEDURE — 74183 MRI ABD W/O CNTR FLWD CNTR: CPT

## 2021-09-17 PROCEDURE — 6360000004 HC RX CONTRAST MEDICATION: Performed by: RADIOLOGY

## 2021-09-17 PROCEDURE — A9581 GADOXETATE DISODIUM INJ: HCPCS | Performed by: RADIOLOGY

## 2021-09-17 RX ADMIN — GADOXETATE DISODIUM 10 ML: 181.43 INJECTION, SOLUTION INTRAVENOUS at 09:29

## 2021-09-21 ENCOUNTER — OFFICE VISIT (OUTPATIENT)
Dept: SURGERY | Age: 71
End: 2021-09-21
Payer: MEDICARE

## 2021-09-21 VITALS
DIASTOLIC BLOOD PRESSURE: 61 MMHG | RESPIRATION RATE: 20 BRPM | HEIGHT: 73 IN | TEMPERATURE: 98.6 F | BODY MASS INDEX: 24.39 KG/M2 | HEART RATE: 55 BPM | SYSTOLIC BLOOD PRESSURE: 113 MMHG | WEIGHT: 184 LBS

## 2021-09-21 DIAGNOSIS — C78.7 METASTATIC CANCER TO LIVER (HCC): Primary | ICD-10-CM

## 2021-09-21 PROCEDURE — 99213 OFFICE O/P EST LOW 20 MIN: CPT | Performed by: TRANSPLANT SURGERY

## 2021-09-21 NOTE — PROGRESS NOTES
Hepatobiliary and Pancreatic Surgery Attending History and Physical    Patient's Name/Date of Birth: Zada Romberg /1950 (30 y.o.)    Date: September 21, 2021     CC: Metastatic Colon Cancer to the Liver    HPI:  Patient had follow up MRI and is seeing me in follow up. He recently had a colonoscopy which wsa negative. Physical Exam:  /61 (Site: Left Upper Arm, Position: Sitting, Cuff Size: Medium Adult)   Pulse 55   Temp 98.6 °F (37 °C) (Temporal)   Resp 20   Ht 6' 1\" (1.854 m)   Wt 184 lb (83.5 kg)   BMI 24.28 kg/m²     PSYCH: mood and affect normal, alert and oriented x 3: No apparent distress, comfortable  EYES: Sclera white, pupils equal round and reactive to light  ENMT:  Hearing normal, trachea midline, ears externally intact  LYMPH: no obvious lympadenopathy in neck. RESP: Respiratory effort was normal with no retractions or use of accessory muscles. CV:  No pedal edema  GI/ Abdomen: Soft, nondistended, nontender, no guarding, no peritoneal signs  MSK: no clubbing/ no cyanosis/ gaitnormal       Assessment/Plan:  Metastatic colon cancer to the liver  - I reviewed their images prior to our office visit and we also reviewed them together today. - his MRI Looks great  -continues to have disappearing liver metastasis - the lesion is a cyst.  - recommend continued follow up with Dr. Raulito Montenegro    20 Minutes of which greater than 50% was spent counseling or coordinating his care. Thank you for the consultation allowing me to take part in Mr Marialuisa avila.      Please send a copy of my note to Dr. Christiano Moralez    Electronically signed by Ifrah Arnett MD on 9/21/2021 at 6:25 PM

## 2021-09-27 ENCOUNTER — HOSPITAL ENCOUNTER (OUTPATIENT)
Age: 71
Discharge: HOME | End: 2021-09-27
Payer: MEDICARE

## 2021-09-27 DIAGNOSIS — C78.7 RECTAL CANCER METASTASIZED TO LIVER (HCC): ICD-10-CM

## 2021-09-27 DIAGNOSIS — C20 RECTAL CANCER METASTASIZED TO LIVER (HCC): ICD-10-CM

## 2021-09-27 LAB
ALBUMIN SERPL-MCNC: 3.7 G/DL (ref 3.5–5.2)
ALP BLD-CCNC: 173 U/L (ref 40–129)
ALT SERPL-CCNC: 16 U/L (ref 0–40)
ANION GAP SERPL CALCULATED.3IONS-SCNC: 8 MMOL/L (ref 7–16)
ANISOCYTOSIS: ABNORMAL
AST SERPL-CCNC: 27 U/L (ref 0–39)
BASOPHILS ABSOLUTE: 0.02 E9/L (ref 0–0.2)
BASOPHILS RELATIVE PERCENT: 0.5 % (ref 0–2)
BILIRUB SERPL-MCNC: 0.7 MG/DL (ref 0–1.2)
BUN BLDV-MCNC: 21 MG/DL (ref 6–23)
CALCIUM SERPL-MCNC: 9.2 MG/DL (ref 8.6–10.2)
CEA: 3.3 NG/ML (ref 0–5.2)
CHLORIDE BLD-SCNC: 105 MMOL/L (ref 98–107)
CO2: 26 MMOL/L (ref 22–29)
CREAT SERPL-MCNC: 1.1 MG/DL (ref 0.7–1.2)
EOSINOPHILS ABSOLUTE: 0.05 E9/L (ref 0.05–0.5)
EOSINOPHILS RELATIVE PERCENT: 1.1 % (ref 0–6)
GFR AFRICAN AMERICAN: >60
GFR NON-AFRICAN AMERICAN: >60 ML/MIN/1.73
GLUCOSE BLD-MCNC: 133 MG/DL (ref 74–99)
HCT VFR BLD CALC: 28.1 % (ref 37–54)
HEMOGLOBIN: 9.3 G/DL (ref 12.5–16.5)
IMMATURE GRANULOCYTES #: 0.03 E9/L
IMMATURE GRANULOCYTES %: 0.7 % (ref 0–5)
LYMPHOCYTES ABSOLUTE: 0.53 E9/L (ref 1.5–4)
LYMPHOCYTES RELATIVE PERCENT: 12.1 % (ref 20–42)
MCH RBC QN AUTO: 30.7 PG (ref 26–35)
MCHC RBC AUTO-ENTMCNC: 33.1 % (ref 32–34.5)
MCV RBC AUTO: 92.7 FL (ref 80–99.9)
MONOCYTES ABSOLUTE: 0.33 E9/L (ref 0.1–0.95)
MONOCYTES RELATIVE PERCENT: 7.5 % (ref 2–12)
NEUTROPHILS ABSOLUTE: 3.43 E9/L (ref 1.8–7.3)
NEUTROPHILS RELATIVE PERCENT: 78.1 % (ref 43–80)
OVALOCYTES: ABNORMAL
PDW BLD-RTO: 16.6 FL (ref 11.5–15)
PLATELET # BLD: 130 E9/L (ref 130–450)
PMV BLD AUTO: 9.3 FL (ref 7–12)
POIKILOCYTES: ABNORMAL
POTASSIUM SERPL-SCNC: 3.8 MMOL/L (ref 3.5–5)
RBC # BLD: 3.03 E12/L (ref 3.8–5.8)
SODIUM BLD-SCNC: 139 MMOL/L (ref 132–146)
TEAR DROP CELLS: ABNORMAL
TOTAL PROTEIN: 7.2 G/DL (ref 6.4–8.3)
WBC # BLD: 4.4 E9/L (ref 4.5–11.5)

## 2021-09-27 PROCEDURE — 36415 COLL VENOUS BLD VENIPUNCTURE: CPT

## 2021-09-27 PROCEDURE — 82378 CARCINOEMBRYONIC ANTIGEN: CPT

## 2021-09-27 PROCEDURE — 85025 COMPLETE CBC W/AUTO DIFF WBC: CPT

## 2021-09-27 PROCEDURE — 80053 COMPREHEN METABOLIC PANEL: CPT

## 2021-09-28 ENCOUNTER — OFFICE VISIT (OUTPATIENT)
Dept: ONCOLOGY | Age: 71
End: 2021-09-28
Payer: MEDICARE

## 2021-09-28 ENCOUNTER — HOSPITAL ENCOUNTER (OUTPATIENT)
Dept: INFUSION THERAPY | Age: 71
Discharge: HOME | End: 2021-09-28
Payer: MEDICARE

## 2021-09-28 VITALS
BODY MASS INDEX: 25 KG/M2 | TEMPERATURE: 98 F | SYSTOLIC BLOOD PRESSURE: 136 MMHG | OXYGEN SATURATION: 100 % | WEIGHT: 188.6 LBS | DIASTOLIC BLOOD PRESSURE: 67 MMHG | HEART RATE: 66 BPM | HEIGHT: 73 IN

## 2021-09-28 DIAGNOSIS — C20 RECTAL CANCER (HCC): Primary | ICD-10-CM

## 2021-09-28 PROCEDURE — 99212 OFFICE O/P EST SF 10 MIN: CPT

## 2021-09-28 PROCEDURE — 99214 OFFICE O/P EST MOD 30 MIN: CPT | Performed by: INTERNAL MEDICINE

## 2021-09-28 NOTE — PROGRESS NOTES
Mutation: Mutation not detected, wild type.     CEA 3488. Bone scan on 11/10/2015 noted no metastatic disease. CT chest on 11/10/2015 revealed Multiple pulmonary nodules in the upper and lower lobes consistent with metastatic disease;   Hepatic metastasis also visualized. 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 Mediport was placed. Cycle # 1 of FOLFOX + Avastin was on 11/23/2015. CEA was 4555 on 11/23/2015. Cycle # 2 of FOLFOX + Avastin was on 12/08/2015. CEA was 3160 on 12/08/2015. Cycle # 3 of FOLFOX + Avastin was on 12/22/2015. CEA was 2516 on 12/21/2015. Cycle # 4 of FOLFOX + Avastin was on 01/05/2016. CEA was 2098 on 01/05/2015. Cycle # 5 of FOLFOX + Avastin was on 01/19/2016. CEA was 1511 on 01/05/2015.     -Bone scan on 01/26/2016 noted no metastatic disease. CT chest on 01/26/2016 revealed Significant response to treatment with no visible residual nodules. CT scan abdomen/pelvis on 01/26/2016 noted Interval decreased size of multiple masses in the liver compatible with treatment response. Continue another 2 months of FOLFOX + Avastin and repeat scans. Cycle # 6 of FOLFOX + Avastin was on 02/02/2016.  on 02/02/2016. Cycle # 7 of FOLFOX + Avastin was on 02/16/2016.  on 02/16/2016. Cycle # 8 of FOLFOX + Avastin was on 03/01/2016.  on 03/01/2016. Cycle # 9 of FOLFOX + Avastin was on 03/15/2016.  on 03/15/2016. Cycle # 10 of FOLFOX + Avastin was on 03/29/2016. .4 on 03/29/2016. Cycle # 11 of FOLFOX + Avastin was on 04/12/2016. .4 on 04/12/2016.     Re-staging scans on 04/19/2016: CT Chest: clear lungs; no evidence of recurring pulmonary nodule; CT Abdomen/Pelvis: Further interval decrease in size of the multiple metastatic hepatic lesions, the largest lesion now measures 3.9 x 3.5 cm and previously measured 4.5 cm in maximum diameter.  No mesenteric lymphadenopathy is identified; Bone Scan: No evidence of osseous metastasis. Continue same regimen and re-stage in 2-3 months. Cycle # 12 FOLFOX + Avastin was on 04/26/2016. .5 on 04/26/2016. Cycle # 13 FOLFOX + Avastin was on 05/10/2016. .3 on 05/10/2016. Cycle # 14 FOLFOX+AVASTIN was on 05/24/2016. .5 on 05/24/2016. Cycle # 15 FOLFOX + Avastin was on 06/07/2016. CEA 90.5 on 06/07/2016. Admitted to [facility] 06/13/2016-06/16/2016 for abdominal pain: EGD noted 1.5 cm clean based duodenal bulb ulceration s/p epinephrine and bicap per Dr. Adwoa Huff. No active bleeding. A. Stomach, biopsy: Mild chronic gastritis, immunostain negative for Helicobacter  B. Esophagus, biopsy: Gastric glandular mucosa with prominent intestinal metaplasia (Mad[place]'s epithelium), negative for epithelial dysplasia, esophageal squamous mucosa not identified  Cycle # 16 FOLFOX (discontinued avastin (bevacizumab) given association of peptic ulcer disease and known association of GI perforation) was on 06/21/2016. Cycle # 17 FOLFOX was on 07/05/2016. CEA 52.6 on 07/19/2016. Cycle # 17 FOLFOX was on 07/05/2016. CEA 52.6 on 07/05/2016. CEA 47.6 on 07/19/2016.     Re-staging scans 07/19/2106: CT Chest negative for metastatic disease. CT Abdomen/Pelvis: Stable hepatic lesions; Question new mural thickening in the cecum. Bone Scan: New Let hip lesion suspicious for bone metastasis.     Increased CEA; new mural thickening in the cecum and new left hip lesion suspicious for bone metastasis are consistent with disease progression; He derived maximum benefit from FOLFOX/AVASTIN. D/C FOLFOX/AVASTIN. We recommended FOLFIRI second line therapy. Cycle # 1 FOLFIRI was on 08/02/2016. CEA 40.8 on 08/02/2016. Xgeva q4 weeks started on 08/02/2016. Cycle # 2 FOLFIRI was on 08/16/2016. CEA 31.7 on 08/16/2016. Cycle # 3 FOLFIRI (added Avastin) was on 08/30/2016 given that ulcers healed on EGD 08/15/2016 by Dr. Ellen Dixon; Protonix bid since avastin re-started.    Colonoscopy on 08/29/2016 (to look at the cecal area) unremarkable. CEA 26.7 on 08/30/2016. Cycle # 4 FOLFIRI/Avastin was on 09/13/2016. CEA 23.4 on 09/13/2016. Cycle # 5 FOLFIRI/Avastin was on 09/27/2016. CEA 22.3 on 09/27/2016. Cycle # 6 FOLFIRI/Avastin was on 10/11/2016. CEA 18.4 on 10/11/2016.      Bone scan 10/21/2016 stable bone metastasis; ? New lesion anterior left sixth rib likely interval healing fracture. CT abdomen/pelvis revealed stable hepatic metastasis. CT chest negative for metastatic disease. Continue FOLFIRI/Avastin and repeat scans in 3 months. Cycle # 7 FOLFIRI/Avastin was on 10/25/2016. CEA 16.1  Cycle # 8 FOLFIRI/Avastin was on 11/08/2016. CEA 15.7  Cycle # 9 FOLFIRI/Avastin was on 11/29/2016. CEA 13.6 on 11/29/2016. Cycle # 10 FOLFIRI/Avastin was on 12/13/2016. CEA 10.6 on 12/13/2016. Cycle # 11 FOLFIRI/Avastin was on 12/27/2016. CEA 11.1 on 12/27/2016. Cycle # 12 FOLFIRI/Avastin was on 01/10/2017. CEA 9.7 on 01/10/2017.       CT chest 01/23/2017 No new pulmonary nodule or lymphadenopathy. Patchy groundglass opacities within the left lower lobe, suggestive of infectious or inflammatory etiology. Followup CT chest in 3 months. Slight increased linear sclerosis along the left anterior sixth rib corresponding to an area of increased uptake on the prior bone scan from 10/21/2016, favored to be related to interval healing changes of a nondisplaced fracture. CT abdomen/pelvis on 01/23/2017 Redemonstration of multiple hepatic metastases, which are similar compared to the prior CT from 10/21/2016. Redemonstration of area of increased sclerosis along the right acetabulum suspicious for metastatic disease. Bone scan on 01/23/2017 Stable subtle uptake within the left proximal diaphysis, again suggestive of metastatic disease  Decreased subtle uptake within the medial right acetabulum.    Decreased uptake within the left anterior sixth rib corresponding to linear sclerosis on the recent CT, favored to be related to an joint rib fracture. Continue FOLFIRI + Avastin and repeat scans in 3 months. Cycle # 13 FOLFIRI + Avastin was on 01/24/2017. Cycle # 14 FOLFIRI + Avastin was on 02/07/2017. Cycle # 15 FOLFIRI + Avastin was on 02/21/2017. Cycle # 16 FOLFIRI + Avastin was on 03/07/2017. Cycle # 17 FOLFIRI + Avastin was on 03/21/2017. Cycle # 18 FOLFIRI + Avastin was on 04/04/2017. CT chest 04/17/2017 negative for metastatic disease. CT abdomen/pelvis 04/17/2017 Stable hypodense metastatic lesions within the liver. Stable area of increased sclerosis along the right acetabulum  Bone scan 04/17/2017 Stable subtle uptake within the left proximal femoral diaphysis; No definite abnormal uptake in the region of a sclerotic lesion along the posterior column of the right acetabulum noted on CT. Stable slight uptake within the left anterior sixth rib corresponding to linear sclerosis on the recent CT, favored to be related to a fracture. Continue FOLFIRI + Avastin and repeat scans in 3 months. Cycle # 19 FOLFIRI + Avastin was on 04/18/2017. Cycle # 20 FOLFIRI + Avastin was on 05/02/2017. Cycle # 21 FOLFIRI + Avastin was on 05/16/2017. Cycle # 22 FOLFIRI + Avastin was on 05/30/2017. Cycle # 23 FOLFIRI + Avastin was on 06/13/2017. Cycle # 24 FOLFIRI + Avastin was on 06/27/2017. Cycle # 25 FOLFIRI + Avastin was on 07/11/2017. Cycle # 26 FOLFIRI + Avastin was on 07/25/2017. Cycle # 27 FOLFIRI + Avastin was on 08/08/2017. Cycle # 28 FOLFIRI + Avastin was on 08/22/2017. Cycle # 29 FOLFIRI + Avastin was on 09/05/2017. Cycle # 30 FOLFIRI + Avastin was on 09/19/2017. Bone scan 09/26/2017 stable. CT abdomen/pelvis on 09/26/2017 Stable hypodense mass in the liver. Stable sclerotic lesion in the acetabulum. CT chest 09/26/2017 negative for metastatic disease. Cycle # 31 FOLFIRI + Avastin was on 10/03/2017. CEA 7.4 on 10/03/2017. Cycle # 32 FOLFIRI + Avastin was on 10/17/2017. CEA 6.0 on 10/17/2017.   Cycle # 33 FOLFIRI + Avastin was on 10/31/2017. CEA 6.8 on 10/31/2017. Cycle # 34 FOLFIRI + Avastin was on 11/14/2017. CEA 7.2 on 11/14/2017. Cycle # 35 FOLFIRI + Avastin was on 11/28/2017. CEA 5.1 on 11/28/2017. Cycle # 36 FOLFIRI + Avastin was on 12/12/2017. CEA 6.1 on 12/12/2017. Bone scan 12/22/2017 noted no evidence of metastatic disease to the axial or appendicular skeleton. CT chest 12/22/2017 noted no evidence of metastatic disease. CT abdomen/pelvis 12/22/2017 noted stable hypodense lesions in the liver. Continue FOLFIRI + Avastin and repeat scans in 3 months. Cycle # 37 FOLFIRI + Avastin was on 12/27/2018. Cycle # 38 FOLFIRI + Avastin was on 01/09/2018. Cycle # 39 FOLFIRI + Avastin was on 01/23/2018. Cycle # 40 FOLFIRI + Avastin was on 02/06/2018. Cycle # 41 FOLFIRI + Avastin was on 02/20/2018. Cycle # 42 FOLFIRI + Avastin was on 03/06/2018. Cycle # 43 FOLFIRI + Avastin was on 03/20/2018. Bone scan on 03/26/2018 negative for metastatic disease. CT chest 03/26/2018 noted ? new 7 mm nodule in LUCY. CT abdomen/pelvis 03/26/2018 noted stable hypodense lesions in the liver. ? New small ascites in the abdomen. Patient wants to continue to monitor the lung finding and repeat scans in 2 months instead of changing the current regimen into oral Lonsurf. Cycle # 44 FOLFIRI + Avastin was on 04/03/2018. CEA 6.3 on 04/03/2018. Cycle # 45 FOLFIRI + Avastin was on 04/24/2018. CEA 5.3 on 04/24/2018. Cycle # 46 FOLFIRI + Avastin was on 05/08/2018. CEA 5.4 on 05/08/2018. Cycle # 47 FOLFIRI + Avastin was on 05/22/2018. CEA 6.1 on 05/22/2018. CT chest 05/31/2018 noted stable nodule in LUCY. No evidence of worsening malignancy. CT abdomen/pelvis on 05/31/2018 noted stable liver metastasis. No evidence of worsening malignancy. Continue FOLFIRI + Avastin and repeat scans in 3 months. Cycle # 48 FOLFIRI + Avastin was on 06/06/2018. Cycle # 49 FOLFIRI + Avastin was on 06/19/2018.    Cycle # 50 FOLFIRI + Avastin was on 07/03/2018. Cycle # 51 FOLFIRI + Avastin was on 07/24/2018. Cycle # 52 FOLFIRI + Avastin was on 08/14/2018. Cycle # 53 FOLFIRI + Avastin was on 08/28/2018. CT chest 09/06/2018 noted interval decrease in size of LUCY measuring up to 4 mm, suggestive of treatment response. No mediastinal or hilar LN  CT abdomen/pelvis 09/06/2018 Slight interval increase in size of a previously noted metastatic lesion within the right hepatic lobe. This may be related to differences in phase of enhancement compared to the most recent study from 5/31/2018, the lesion remains decreased in size compared to the more previous studies. No abdominal, retroperitoneal, or pelvic lymphadenopathy. Continue FOLFIRI + Avastin and repeat scans in 2 months. Cycle # 54 FOLFIRI + Avastin was on 09/11/2018. Cycle # 55 FOLFIRI + Avastin was on 09/25/2018. Cycle # 56 FOLFIRI + Avastin was on 10/09/2018. Cycle # 57 FOLFIRI + Avastin was on 10/23/2018. CT chest 11/02/2018 stable 3 mm nodule within LUCY. No new right and left lung nodule. No mediastinal or osseous lesion. CT abdomen/pelvis 11/02/2018 stable metastatic disease to liver. No new metastatic disease identified. No mesenteric or retroperitoneal LN. No gross colonic lesion identified. Continue FOLFIRI + Avastin and repeat scans in 3 months. Cycle # 58 FOLFIRI + Avastin was on 11/06/2018. CEA 7.6 on 11/05/2018. Cycle # 59 FOLFIRI + Avastin was on 11/27/2018. CEA 6.9 on 11/26/2018. Cycle # 60 FOLFIRI + Avastin was on 12/11/2018. CEA 6.7 on 12/11/2018. Cycle # 61 FOLFIRI + Avastin was on 01/08/2019. CEA 5.6 on 01/07/2019. Cycle # 62 FOLFIRI + Avastin was on 01/29/2019. CEA 4.6 on 01/28/2019. Cycle # 63 FOLFIRI + Avastin was on 02/12/2019. CEA 4.3 on 02/11/2019. CT chest 02/21/2019 no evidence of metastatic disease. CT abdomen/pelvis 02/21/2019 stable hypodense liver lesions. No evidence of worsening metastatic disease.  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 Ordered MRI thoracic spine and referred to neurosurgery team.    Cycle # 64 FOLFIRI + Avastin was on 02/26/2019. CEA 4.7 on 02/25/2019. Cycle # 65 FOLFIRI + Avastin was on 03/12/2019. CEA 4.0 on 03/11/2019. Cycle # 66 FOLFIRI + Avastin was on 03/26/2019. CEA 4.7 on 03/25/2019. Cycle # 67 FOLFIRI + Avastin was on 04/09/2019. CEA 5.6 on 04/08/2019. Cycle # 68 FOLFIRI + Avastin was on 04/30/2019. CEA 4.9 on 04/29/2019. Cycle # 69 FOLFIRI + Avastin was on 05/14/2019. CEA 5.3 on 05/14/2019. CT chest 05/23/2019 stable small lung nodule with no evidence of metastatic disease. CT abdomen/pelvis 05/23/2019 Decrease conspicuity of the liver lesions. No new lesions seen. Stable splenomegaly. Continue FOLFIRI + Avastin and repeat scans in 3 months. Cycle # 70 FOLFIRI + Avastin was on 06/04/2019. CEA 4.8 on 06/03/2019. Cycle # 71 FOLFIRI + Avastin was on 06/18/2019. CEA 4.6 on 06/17/2019. Cycle # 72 FOLFIRI + Avastin was on 07/09/2019. CEA 4.3 on 07/08/2019. Cycle # 73 FOLFIRI + Avastin was on 07/30/2019. CEA 5.0 on 07/29/2019. Cycle # 74 FOLFIRI + Avastin was on 08/13/2019. CEA 5.0 on 08/12/2019. CT chest 08/20/2019 negative  CT abdomen/pelvis 08/20/2019 Previous identified hepatic lesions are not visualized on the current exam.  Continue FOLFIRI + Avastin and repeat scans in 3 months. Cycle # 75 FOLFIRI + Avastin was on 08/27/2019. CEA 5.0 on 08/26/2019. Cycle # 76 FOLFIRI + Avastin was on 09/17/2019. CEA 5.5 on 09/16/2019. Cycle # 77 FOLFIRI + Avastin was on 10/15/2019. CEA 4.6 on 10/15/2019. Cycle # 78 FOLFIRI + Avastin was on 10/29/2019. CEA 4.1 on 10/28/2019. Cycle # 79 FOLFIRI + Avastin was on 11/12/2019. CEA 4.3 on 11/12/2019. Cycle # 80 FOLFIRI + Avastin was on 12/10/2019. CEA 4.0 on 12/09/2019.   CT chest 12/19/2019 Stable 3 mm nodule within the left upper lobe, similar to the previous studies dating back to 11/2/2018, however decreased in size compared to the CT from 3/26/2018. No thoracic LN. CT abdomen/pelvis 12/19/2019 Previously described small hypodense lesions are more conspicuous on the current study compared to the recent study throughout the liver from 8/20/2019, however similar to the prior study from 2/21/2019. Findings may be related to differences in contrast opacification. No abdominal or pelvic lymphadenopathy. Continue FOLFIRI + Avastin and repeat scans in 2 months to f/u on liver lesions. Cycle # 81 FOLFIRI + Avastin was on 01/07/2020. CEA 3.8 on 01/06/2020. Cycle # 82 FOLFIRI + Avastin was on 01/21/2020. CEA 3.7 on 01/20/2020. Cycle # 83 FOLFIRI + Avastin was on 02/04/2020. CEA 4.1 on 02/03/2020. Cycle # 84 FOLFIRI + Avastin was on 02/18/2020. CEA 4.6 on 02/17/2020. EGD by Dr. Kaden Warren 03/02/2020 showing no masses or lesions  Cycle # 85 FOLFIRI + Avastin was on 03/03/2020. CEA 7.4 on 03/02/2020. Cycle # 86 FOLFIRI + Avastin was on 03/17/2020. CEA 4.5 on 03/16/2020. Colonoscopy on 03/23/2020 by Dr. Kaden Warren unremarkable (records requested). Cycle # 87 FOLFIRI + Avastin was on 03/31/2020. CEA 4.1 on 03/30/2020. Cycle # 88 FOLFIRI + Avastin was on 04/21/2020. CEA 6.4 on 04/20/2020. Cycle # 89 FOLFIRI + Avastin was on 05/05/2020. CEA 5.8 on 05/04/2020. Cycle # 90 FOLFIRI + Avastin was on 06/02/2020. CEA 5.5 on 06/01/2020. Cycle # 91 FOLFIRI + Avastin was on 06/16/2020. CEA 5.6 on 06/15/2020. CT chest 06/23/2020 noted no metastatic disease in the chest.   CT abdomen/pelvis 06/23/2020 Stable small liver lesions measuring up to 5 mm since 2019.   22 mm round mass in segment 8 of the liver with central high density stable from December 2019), previously measuring up to 34 mm in 2017. No additional metastatic disease in the abdomen or pelvis. Small amount of ascites. Overall stable disease. Continue FOLFIRI + Avastin and repeat scans in 2-3 months. Cycle # 92 FOLFIRI + Avastin was on 07/07/2020. CEA 5.7 on 07/06/2020. Cycle # 93 FOLFIRI + Avastin was on 07/21/2020. CEA 5.9 on 07/20/2020. Cycle # 94 FOLFIRI + Avastin was on 08/04/2020. CEA 5.5 on 08/04/2020. Cycle # 95 FOLFIRI + Avastin was on 08/25/2020. CEA 6.1 on 08/24/2020. CT chest 9/2/2020 stable unremarkable enhanced CT of the thorax. There is no metastatic disease to the lungs. CT abdomen pelvis on 9/2/2020 stable 2.2 cm low attenuated lesion within the central aspect of the right hepatic lobe favored to represent treated metastatic disease. No new hepatic lesion is identified. Stable splenomegaly  There is no appreciable colonic lesion or stricture  Pericholecystic fluid of uncertain etiology. General surgery team consulted. Laparoscopic cholecystectomy with normal cholangiogram 10/27/20  Gallbladder: Chronic cholecystitis and cholelithiasis.  Unremarkable regional lymph node. 11/13/2020; Seen by Dr. Sheridan Mart from General surgery team; cleared to re-start chemotherapy. Cycle # 96 FOLFIRI + Avastin was on 11/17/2020. CEA 3.6 on 11/16/2020. Cycle # 97 FOLFIRI + Avastin was on 12/01/2020. CEA 3.5 on 11/30/2020. Cycle # 98 FOLFIRI + Avastin was on 12/15/2020. CEA 4.3 on 12/15/2020. Cycle # 99 FOLFIRI + Avastin was on 01/05/2021. CEA 4.7 on 01/04/2021. CT chest 01/13/2021 negative for metastatic disease. CT abdomen/pelvis 01/13/2021 Unchanged central hepatic lesion. No specific signs of abdominopelvic metastatic disease. Continue FOLFIRI + Avastin and repeat scans in 3 months. Cycle # 100 FOLFIRI + Avastin was on 01/19/2021. CEA 4.7 on 01/18/2021. Cycle # 101 FOLFIRI + Avastin was on 02/02/2021. CEA 5.2 on 02/01/2021. Cycle # 102 FOLFIRI + Avastin was on 02/16/2021. CEA 5.6 on 02/15/2021. Cycle # 103 FOLFIRI + Avastin was on 03/02/2021. Cycle # 104 FOLFIRI (20% dose reduced due to significant toxicity) + Avastin was on 03/16/2021. Cycle # 105 FOLFIRI (same dose as cycle # 104) + Avastin was on 03/30/2021. CEA 6.5 on 03/29/2021.   Cycle # 106 History               Diagnosis Date    Arthritis      Cancer ([facility] [address]         colon    Depression      Hyperlipidemia      Hypertension           Medications:  Reviewed and reconciled.     Allergies: Allergies   Allergen Reactions    Neosporin [Neomycin-Polymyxin-Gramicidin] Rash    Tape [employer] Tape] Rash      Physical Exam:  /67   Pulse 66   Temp 98 °F (36.7 °C)   Ht 6' 1\" (1.854 m)   Wt 188 lb 9.6 oz (85.5 kg)   SpO2 100%   BMI 24.88 kg/m²   GENERAL: Alert, oriented x 3, not in distress  HEENT: PERRLA, EOMI. NECK: Supple. Without lymphadenopathy. LUNGS: Lungs are CTA bilaterally, with no wheezing, crackles or rhonchi. CARDIOVASCULAR: Regular rhythm. No murmurs, rubs or gallops. ABDOMEN: Soft. Non-tender, non-distended. Positive bowel sounds. hernia  EXTREMITIES: Without clubbing, cyanosis or edema. NEUROLOGIC: No focal deficits. ECOG PS 1    Diagnostics:  Lab Results   Component Value Date    WBC 4.4 (L) 09/27/2021    HGB 9.3 (L) 09/27/2021    HCT 28.1 (L) 09/27/2021    MCV 92.7 09/27/2021     09/27/2021     Lab Results   Component Value Date     09/27/2021    K 3.8 09/27/2021     09/27/2021    CO2 26 09/27/2021    BUN 21 09/27/2021    CREATININE 1.1 09/27/2021    GLUCOSE 133 (H) 09/27/2021    CALCIUM 9.2 09/27/2021    PROT 7.2 09/27/2021    LABALBU 3.7 09/27/2021    BILITOT 0.7 09/27/2021    ALKPHOS 173 (H) 09/27/2021    AST 27 09/27/2021    ALT 16 09/27/2021    LABGLOM >60 09/27/2021    GFRAA >60 09/27/2021     Lab Results   Component Value Date    CEA 3.3 09/27/2021     Impression/Plan:  67 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 KRAS Mutation: Mutation detected. BRAF Mutation: Mutation not detected. 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 CT scan abdomen/pelvis on 10/26/2015 revealed small nodules at the lung bases, extensive liver lesions consistent with metastatic rectosigmoid cancer.    CEA 3488 on re-stage in 2-3 months. Cycle # 12 FOLFOX + Avastin was on 04/26/2016. .5 on 04/26/2016. Cycle # 13 FOLFOX + Avastin was on 05/10/2016. .3 on 05/10/2016. Cycle # 14 FOLFOX+AVASTIN was on 05/24/2016. .5 on 05/24/2016. Cycle # 15 FOLFOX + Avastin was on 06/07/2016. CEA 90.5 on 06/07/2016. Admitted to [facility] 06/13/2016-06/16/2016 for abdominal pain: EGD noted 1.5 cm clean based duodenal bulb ulceration s/p epinephrine and bicap per Dr. Connor Hilton. No active bleeding. A. Stomach, biopsy: Mild chronic gastritis, immunostain negative for Helicobacter  B. Esophagus, biopsy: Gastric glandular mucosa with prominent ntestinal metaplasia ([place]'s epithelium), negative for epithelial dysplasia, esophageal squamous mucosa not identified     Cycle # 16 FOLFOX (discontinued avastin (bevacizumab) given association of peptic ulcer disease and known association of GI perforation.) was on 06/21/2016. CEA 47 on 06/21/2016. Cycle # 17 FOLFOX was on 07/05/2016. CEA 52.6 on 07/05/2016. CEA 47.6 on 07/19/2016.     Re-staging scans 07/19/2106: CT Chest negative for metastatic disease. CT Abdomen/Pelvis: Stable hepatic lesions; Question new mural thickening in the cecum. Bone Scan: New Let hip lesion suspicious for bone metastasis.     Increased CEA; new mural thickening in the cecum and new left hip lesion suspicious for bone metastasis are consistent with disease progression; He derived maximum benefit from FOLFOX/AVASTIN. D/C FOLFOX/AVASTIN. We recommended FOLFIRI second line therapy. Cycle # 1 FOLFIRI was on 08/02/2016. CEA 40.8 on 08/02/2016. Xgeva q4 weeks started on 08/02/2016. Cycle # 2 FOLFIRI was on 08/16/2016. CEA 31.7 on 08/16/2016. Cycle # 3 FOLFIRI (added Avastin) was on 08/30/2016 given that ulcers healed on EGD 08/15/2016 by Dr. Lul Boothe; Protonix bid since avastin re-started. Colonoscopy on 08/29/2016 (to look at the cecal area) unremarkable. CEA 26.7 on 08/30/2016.   Cycle # 4 FOLFIRI + Avastin and repeat scans in 3 months. Cycle # 13 FOLFIRI + Avastin was on 01/24/2017. Cycle # 14 FOLFIRI + Avastin was on 02/07/2017. Cycle # 15 FOLFIRI + Avastin was on 02/21/2017. Cycle # 16 FOLFIRI + Avastin was on 03/07/2017. Cycle # 17 FOLFIRI + Avastin was on 03/21/2017. Cycle # 18 FOLFIRI + Avastin was on 04/04/2017. CT chest 04/17/2017 negative for metastatic disease. CT abdomen/pelvis 04/17/2017 Stable hypodense metastatic lesions within the liver. Stable area of increased sclerosis along the right acetabulum  Bone scan 04/17/2017 Stable subtle uptake within the left proximal femoral diaphysis; No definite abnormal uptake in the region of a sclerotic lesion along the posterior column of the right acetabulum noted on CT. Stable slight uptake within the left anterior sixth rib corresponding to linear sclerosis on the recent CT, favored to be related to a fracture. Continue FOLFIRI + Avastin and repeat scans in 3 months. Cycle # 19 FOLFIRI + Avastin was on 04/18/2017. CEA 7.5 on 04/18/2017. Cycle # 20 FOLFIRI + Avastin was on 05/02/2017. CEA 7.7 on 05/02/2017. Cycle # 21 FOLFIRI + Avastin was on 05/16/2017. CEA 7.1 on 05/16/2017. Cycle # 22 FOLFIRI + Avastin was on 05/30/2017. CEA 8.2 on 05/30/2017. Cycle # 23 FOLFIRI + Avastin was on 06/13/2017. CEA 7.7 on 06/13/2017. Cycle # 24 FOLFIRI + Avastin was on 06/27/2017. CEA 6.6 on 06/27/2017. Re-staging scans 07/05/2017:  Bone scan stable proximal left femoral diaphysis, right acetabulum, and left anterior sixth rib lesions;  CT abdomen/pelvis stable hypodense metastatic lesions within the liver; CT chest without convincing evidence of metastatic disease. Cycle # 25 FOLFIRI + Avastin was on 07/11/2017. CEA 6.3 on 07/11/2017. Cycle # 26 FOLFIRI + Avastin was on 07/25/2017. CEA 7.3 on 07/25/2017. Cycle # 27 FOLFIRI + Avastin was on 08/08/2017. CEA 6.5 on 08/08/2017. Cycle # 28 FOLFIRI + Avastin was on 08/22/2017. CEA 5.9 on 08/22/2017. Cycle # 29 FOLFIRI + Avastin was on 09/05/2017. CEA 6.3 on 09/05/2017. Cycle # 30 FOLFIRI + Avastin was on 09/19/2017. CEA 6.3 on 09/19/2017. Bone scan 09/26/2017 stable. CT abdomen/pelvis on 09/26/2017 Stable hypodense mass in the liver. Stable sclerotic lesion in the acetabulum. CT chest 09/26/2017 negative for metastatic disease. Cycle # 31 FOLFIRI + Avastin was on 10/03/2017. CEA 7.4 on 10/03/2017. Cycle # 32 FOLFIRI + Avastin was on 10/17/2017. CEA 6.0 on 10/17/2017. Cycle # 33 FOLFIRI + Avastin was on 10/31/2017. CEA 6.8 on 10/31/2017. Cycle # 34 FOLFIRI + Avastin was on 11/14/2017. CEA 7.2 on 11/14/2017. Cycle # 35 FOLFIRI + Avastin was on 11/28/2017. CEA 5.1 on 11/28/2017. Cycle # 36 FOLFIRI + Avastin was on 12/12/2017. CEA 6.1 on 12/12/2017. Bone scan 12/22/2017 noted no evidence of metastatic disease to the axial or appendicular skeleton. CT chest 12/22/2017 noted no evidence of metastatic disease. CT abdomen/pelvis 12/22/2017 noted stable hypodense lesions in the liver. Continue FOLFIRI + Avastin and repeat scans in 3 months. Cycle # 37 FOLFIRI + Avastin was on 12/27/2018. CEA 6.7 on 12/27/2017. Cycle # 38 FOLFIRI + Avastin was on 01/09/2018. CEA 5.0 on 01/09/2018. Cycle # 39 FOLFIRI + Avastin was on 01/23/2018. CEA 6.5 on 01/23/2018. Cycle # 40 FOLFIRI + Avastin was on 02/06/2018. CEA 6.9 on 02/06/2018. Cycle # 41 FOLFIRI + Avastin was on 02/20/2018. CEA 6.4 on 02/20/2018. Cycle # 42 FOLFIRI + Avastin was on 03/06/2018. CEA 6.6 on 03/06/2018. Cycle # 43 FOLFIRI + Avastin was on 03/20/2018. CEA 5.7 on 03/20/2018. Bone scan on 03/26/2018 negative for metastatic disease. CT chest 03/26/2018 noted ? new 7 mm nodule in LUCY. CT abdomen/pelvis 03/26/2018 noted stable hypodense lesions in the liver. ? New small ascites in the abdomen.   Patient wants to continue to monitor the lung finding and repeat scans in 2 months instead of changing the current chemotherapy regimen to oral Lonsurf. Cycle # 44 FOLFIRI + Avastin was on 04/03/2018. CEA 6.3 on 04/03/2018. Cycle # 45 FOLFIRI + Avastin was on 04/24/2018. CEA 5.3 on 04/24/2018. Cycle # 46 FOLFIRI + Avastin was on 05/08/2018. CEA 5.4 on 05/08/2018. Cycle # 47 FOLFIRI + Avastin was on 05/22/2018. CEA 6.1 on 05/22/2018. CT chest 05/31/2018 noted stable nodule in LUCY. No evidence of worsening malignancy. CT abdomen/pelvis on 05/31/2018 noted stable liver metastasis. No evidence of worsening malignancy. Continue FOLFIRI + Avastin and repeat scans in 3 months. Cycle # 48 FOLFIRI + Avastin was on 06/06/2018. CEA 6.3 on 06/05/2018. Cycle # 49 FOLFIRI + Avastin was on 06/19/2018. CEA 6.1 on 06/19/2018. Cycle # 50 FOLFIRI + Avastin was on 07/03/2018. CEA 7.4 on 07/03/2018. Cycle # 51 FOLFIRI + Avastin was on 07/24/2018. CEA 6.7 on 07/24/2018. Cycle # 52 FOLFIRI + Avastin was on 08/14/2018. CEA 6.8 on 08/14/2018. Cycle # 53 FOLFIRI + Avastin was on 08/28/2018. CEA 6.5 on 08/27/2018. CT chest 09/06/2018 noted interval decrease in size of LUCY measuring up to 4 mm, suggestive of treatment response. No mediastinal or hilar LN  CT abdomen/pelvis 09/06/2018 Slight interval increase in size of a previously noted metastatic lesion within the right hepatic lobe. This may be related to differences in phase of enhancement compared to the most recent study from 5/31/2018, the lesion remains decreased in size compared to the more previous studies. No abdominal, retroperitoneal, or pelvic lymphadenopathy. Continue FOLFIRI + Avastin and repeat scans in 2 months. Cycle # 54 FOLFIRI + Avastin was on 09/11/2018. CEA 6.4 on 09/10/2018. Cycle # 55 FOLFIRI + Avastin was on 09/25/2018. CEA 6.4 on 09/25/2018. Cycle # 56 FOLFIRI + Avastin was on 10/09/2018. CEA 6.7 on 10/08/2018. Cycle # 57 FOLFIRI + Avastin was on 10/23/2018. CEA 7.2 on 10/22/2018. CT chest 11/02/2018 stable 3 mm nodule within LUCY.  No new right and left lung nodule. No mediastinal or osseous lesion. CT abdomen/pelvis 11/02/2018 stable metastatic disease to liver. No new metastatic disease identified. No mesenteric or retroperitoneal LN. No gross colonic lesion identified. Continue FOLFIRI + Avastin and repeat scans in 3 months. Cycle # 58 FOLFIRI + Avastin was on 11/06/2018. CEA 7.6 on 11/05/2018. Cycle # 59 FOLFIRI + Avastin was on 11/27/2018. CEA 6.9 on 11/26/2018. Cycle # 60 FOLFIRI + Avastin was on 12/11/2018. CEA 6.7 on 12/11/2018. Cycle # 61 FOLFIRI + Avastin was on 01/08/2019. CEA 5.6 on 01/07/2019. Cycle # 62 FOLFIRI + Avastin was on 01/29/2019. CEA 4.6 on 01/28/2019. Cycle # 63 FOLFIRI + Avastin was on 02/12/2019. CEA 4.3 on 02/11/2019. CT chest 02/21/2019 no evidence of metastatic disease. CT abdomen/pelvis 02/21/2019 stable hypodense liver lesions. No evidence of worsening metastatic disease. 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 Ordered MRI thoracic spine and referred to neurosurgery team.  Cycle # 64 FOLFIRI + Avastin was on 02/26/2019. CEA 4.7 on 02/25/2019. Cycle # 65 FOLFIRI + Avastin was on 03/12/2019. CEA 4.0 on 03/11/2019. Cycle # 66 FOLFIRI + Avastin was on 03/26/2019. CEA 4.7 on 03/25/2019. Cycle # 67 FOLFIRI + Avastin was on 04/09/2019. CEA 5.6 on 04/08/2019. Cycle # 68 FOLFIRI + Avastin was on 04/30/2019. CEA 4.9 on 04/29/2019. Cycle # 69 FOLFIRI + Avastin was on 05/14/2019. CEA 5.3 on 05/14/2019. CT chest 05/23/2019 stable small lung nodule with no evidence of metastatic disease. CT abdomen/pelvis 05/23/2019 Decrease conspicuity of the liver lesions. No new lesions seen. Stable splenomegaly. Continue FOLFIRI + Avastin and repeat scans in 3 months. Cycle # 70 FOLFIRI + Avastin was on 06/04/2019. CEA 4.8 on 06/03/2019. Cycle # 71 FOLFIRI + Avastin was on 06/18/2019. CEA 4.6 on 06/17/2019. Cycle # 72 FOLFIRI + Avastin was on 07/09/2019. CEA 4.3 on 07/08/2019.   Cycle # 73 FOLFIRI + Continue FOLFIRI Avastin and repeat scans in 3 months  EGD by Dr. Tyra Parisi 03/02/2020 showing no masses or lesions. Cycle # 85 FOLFIRI + Avastin was on 03/03/2020. CEA 7.4 on 03/02/2020. Cycle # 86 FOLFIRI + Avastin was on 03/17/2020. CEA 4.5 on 03/16/2020. Colonoscopy on 03/23/2020 by Dr. Tyra Parisi unremarkable (records requested). Cycle # 87 FOLFIRI + Avastin was on 03/31/2020. CEA 4.1 on 03/30/2020. Cycle # 88 FOLFIRI + Avastin was on 04/21/2020. CEA 6.4 on 04/20/2020. Cycle # 89 FOLFIRI + Avastin was on 05/05/2020. CEA 5.8 on 05/04/2020. Cycle # 90 FOLFIRI + Avastin was on 06/02/2020. CEA 5.5 on 06/01/2020. Cycle # 91 FOLFIRI + Avastin was on 06/16/2020. CEA 5.6 on 06/15/2020. CT chest 06/23/2020 noted no metastatic disease in the chest.   CT abdomen/pelvis 06/23/2020 Stable small liver lesions measuring up to 5 mm since 2019.   22 mm round mass in segment 8 of the liver with central high density stable from December 2019), previously measuring up to 34 mm in 2017. No additional metastatic disease in the abdomen or pelvis. Small amount of ascites. Overall stable disease. Continue FOLFIRI + Avastin and repeat scans in 2-3 months. Cycle # 92 FOLFIRI + Avastin was on 07/07/2020. CEA 5.7 on 07/06/2020. Cycle # 93 FOLFIRI + Avastin was on 07/21/2020. CEA 5.9 on 07/20/2020. Cycle # 94 FOLFIRI + Avastin was on 08/04/2020. CEA 5.5 on 08/04/2020. Cycle # 95 FOLFIRI + Avastin was on 08/25/2020. CEA 6.1 on 08/24/2020. CT chest 9/2/2020 stable unremarkable enhanced CT of the thorax. There is no metastatic disease to the lungs. CT abdomen pelvis on 9/2/2020 stable 2.2 cm low attenuated lesion within the central aspect of the right hepatic lobe favored to represent treated metastatic disease. No new hepatic lesion is identified. Stable splenomegaly  There is no appreciable colonic lesion or stricture. Pericholecystic fluid of uncertain etiology.      Laparoscopic cholecystectomy with normal cholangiogram 10/27/20  Gallbladder: Chronic cholecystitis and cholelithiasis.  Unremarkable regional lymph node. 11/13/2020; Seen by Dr. Gertrude Fernando from General surgery team; cleared to re-start chemotherapy. Cycle # 96 FOLFIRI + Avastin was on 11/17/2020. CEA 3.6 on 11/16/2020. Cycle # 97 FOLFIRI + Avastin was on 12/01/2020. CEA 3.5 on 11/30/2020. Cycle # 98 FOLFIRI + Avastin was on 12/15/2020. CEA 4.3 on 12/15/2020. Cycle # 99 FOLFIRI + Avastin was on 01/05/2021. CEA 4.7 on 01/04/2021. CT chest 01/13/2021 negative for metastatic disease. CT abdomen/pelvis 01/13/2021 Unchanged central hepatic lesion. No specific signs of abdominopelvic metastatic disease. Continue FOLFIRI + Avastin and repeat scans in 3 months. Cycle # 100 FOLFIRI + Avastin was on 01/19/2021. CEA 4.7 on 01/18/2021. Cycle # 101 FOLFIRI + Avastin was on 02/02/2021. CEA 5.2 on 02/01/2021. Cycle # 102 FOLFIRI + Avastin was on 02/16/2021. CEA 5.6 on 02/15/2021. Cycle # 103 FOLFIRI + Avastin was on 03/02/2021. Cycle # 104 FOLFIRI (20% dose reduced due to significant toxicity) + Avastin was on 03/16/2021. Cycle # 105 FOLFIRI (same dose as cycle # 104) + Avastin was on 03/30/2021. CEA 6.5 on 03/29/2021. Cycle # 106 FOLFIRI (same dose as cycle # 104) + Avastin was on 04/13/2021. CEA 6.0 on 04/12/2021  CT chest 04/20/2021 no evidence of metastatic disease. CT abdomen/pelvis 04/20/2021 No evidence of progressive metastatic disease. Stable 2 cm lesion in the right lobe of the liver, likely representing treated metastatic disease. Imaging reviewed. Continue FOLFIRI + Avastin and repeat scans in 3 months  Cycle # 107 FOLFIRI + Avastin was on 04/27/2021  Cycle # 108 FOLFIRI + Avastin was on 05/11/2021. CEA 6.4 on 05/10/2021. Cycle # 109 FOLFIRI (held Avastin due to upcoming surgery) on 05/25/2021. CEA 6.3 on 5/24/21  Cycle # 110 FOLFIRI (held Avastin due to upcoming surgery) on 06/08/2021. CEA 6.3 on 6/07/21. CEA 5.8 on 06/28/2021.      Right inguinal/scrotol hernia repair on 07/13/2021 with Dr Parekh Nearing with folely removal on 07/23/2021. He developed fever on 07/24/2021 and was brought to [address] [place], [place] [address] ER via EMS; Sepsis secondary to urinary tract infection; Completed Abx  CT chest 08/13/2021 no sign of recurrent or metastatic disease. CT abdomen/pelvis 08/13/2021 unchanged hepatic lesions. Splenomegaly and secondary signs of portal venous hypertension  CEA 4.0 on 08/16/2021  CEA 4.1 on 08/30/2021  Cycle # 111 FOLFIRI was on 08/31/2021. HBP team for evaluation of liver lesions; His lesion does appear to be a hemangioma as his prior metastatic deposits in his liver were hypo attenuating compared to this CT which is hyper attenuating. compared to his prior CT scans he has disappearing liver metastasis. MRI liver on 09/17/2021 No suspicious liver lesion identified. Colonoscopy GI Dr. Rosanne Chung on 09/20/2021 unremarkable  CEA 3.3 on 09/27/2021. Chemo holiday at this day  RTC 4 weeks with labs.      MSI testing noted no mismatch repair protein loss of expression    9/28/2021  Jihan Henry MD  Board Certified Medical Oncologist

## 2021-10-15 ENCOUNTER — OFFICE VISIT (OUTPATIENT)
Dept: SURGERY | Age: 71
End: 2021-10-15
Payer: MEDICARE

## 2021-10-15 VITALS
TEMPERATURE: 99.3 F | HEART RATE: 61 BPM | HEIGHT: 73 IN | BODY MASS INDEX: 24.92 KG/M2 | SYSTOLIC BLOOD PRESSURE: 122 MMHG | WEIGHT: 188 LBS | DIASTOLIC BLOOD PRESSURE: 67 MMHG

## 2021-10-15 DIAGNOSIS — K40.90 LEFT INGUINAL HERNIA: Primary | ICD-10-CM

## 2021-10-15 PROBLEM — K92.2 ACUTE GI BLEEDING: Status: RESOLVED | Noted: 2020-05-15 | Resolved: 2021-10-15

## 2021-10-15 PROBLEM — K81.0 ACUTE CHOLECYSTITIS: Status: RESOLVED | Noted: 2020-10-27 | Resolved: 2021-10-15

## 2021-10-15 PROCEDURE — 99213 OFFICE O/P EST LOW 20 MIN: CPT | Performed by: SURGERY

## 2021-10-15 NOTE — PROGRESS NOTES
Ellen Howard  10/15/2021  [facility] office visit    Ellen Howard is a 66 y.o. male post open right inguinal hernia repair with mesh 7/12/21, developed a urinary tract infection and was readmitted 7/26/21, treated with catheter and antibiotics and improved. Finished with chemotherapy for the metastatic rectal cancer. Weight: 188 lb (85.3 kg). Past Medical History:   Diagnosis Date    Arthritis     Cancer ([address]     colon    Depression     History of blood transfusion     [place]    Hyperlipidemia     Hypertension     Prolonged emergence from general anesthesia     Retention of urine      Past Surgical History:   Procedure Laterality Date    CHOLECYSTECTOMY, LAPAROSCOPIC N/A 10/27/2020    CHOLECYSTECTOMY LAPAROSCOPIC WITH IOC, ROBOT XI ASSISTED, POSS OPEN performed by Susanna Johns MD at [address] [place] [address] E  11/2/15    bx, rectal mass    FRACTURE SURGERY      HERNIA REPAIR Right 7/13/2021    OPEN RIGHT INGUINAL HERNIA REPAIR WITH MESH (CPT 12782) performed by Susanna Johns MD at [facility] ARTHROSCOPY Right     LIVER BIOPSY  11/12/15    SKIN FULL GRAFT      on finger    TONSILLECTOMY Bilateral     TUNNELED CENTRAL VENOUS CATHETER W/ SUBCUTANEOUS PORT Left        Home Medications  Prior to Visit Medications    Medication Sig Taking?  Authorizing Provider   hydrocortisone 2.5 % cream APPLY TO AFFECTED AREA, NOT TO FACE OR GENITAL AREAS, NOT TO EXCEED 4 WKS OF USE  Historical Provider, MD   Magic Mouthwash (MIRACLE MOUTHWASH) Swish and swallow 15 mLs 4 times daily as needed for Irritation Equal Amts of Benadryl,Maalox, and Xylocaine  Ernestine Peters, APRN - CNP   ferrous gluconate (FERGON) 324 (38 Fe) MG tablet Take 324 mg by mouth daily (with breakfast)  Historical Provider, MD   traMADol (ULTRAM) 50 MG tablet TAKE 1 TABLET BY MOUTH EVERY 4 TO 6 HOURS AS NEEDED FOR PAIN  Historical Provider, MD   tamsulosin (FLOMAX) 0.4 MG capsule Take 2 capsules by mouth daily  Patient taking differently: Take 0.4 mg by mouth 2 times daily   Donita Magallon MD   ondansetron (ZOFRAN) 4 MG tablet Take 1 tablet by mouth every 8 hours as needed for Nausea or Vomiting  RUBEN He CNP   lactulose (CHRONULAC) 10 GM/15ML solution Take 20 g by mouth as needed   Historical Provider, MD   furosemide (LASIX) 20 MG tablet Take 20 mg by mouth daily Indications: pt is taken this med 7 days a week for now   Historical Provider, MD   magnesium oxide (MAG-OX) 400 (240 Mg) MG tablet Take 1 tablet by mouth 2 times daily  Patient taking differently: Take 400 mg by mouth daily   Ron Ilsas MD   potassium chloride (KLOR-CON M) 20 MEQ extended release tablet Take 40 mEq by mouth daily   Historical Provider, MD   loratadine (CLARITIN) 10 MG tablet Take 10 mg by mouth See Admin Instructions Takes the week of chemotherapy (Last dose: 5/9; Next dose: 5/26)  Historical Provider, MD   pantoprazole (PROTONIX) 40 MG tablet Take 40 mg by mouth daily  Historical Provider, MD   b complex vitamins capsule Take 1 capsule by mouth daily  Historical Provider, MD   diphenoxylate-atropine (LOMOTIL) 2.5-0.025 MG per tablet Take 1 tablet by mouth 4 times daily as needed for Diarrhea. Historical Provider, MD   polyethylene glycol (GLYCOLAX) packet Take 17 g by mouth daily as needed for Constipation  Historical Provider, MD   hydrochlorothiazide (HYDRODIURIL) 25 MG tablet Take 1 tablet by mouth daily  RUBEN Lees - CNP   hydrocortisone (ANUSOL-HC) 2.5 % rectal cream Use 3-4 times daily. Do not use internally. External use only.   RUBEN Salazar - CNP   acetaminophen (TYLENOL) 500 MG tablet Take 500 mg by mouth every 6 hours as needed for Pain  Historical Provider, MD       Allergies: Neosporin [neomycin-polymyxin-gramicidin] and Tape [adhesive tape]     Physical Exam:   VITALS: Blood pressure 122/67, pulse 61, temperature 99.3 °F (37.4 °C), height 6' 1\" (1.854 m), weight 188 lb (85.3 kg). General appearance: alert, appears stated age and cooperative, does ambulate easily  Head: Normocephalic, without obvious abnormality, atraumatic  Eyes: PERRL  Ears/mouth/throat:  Ears clear, mouth normal, throat no redness  Neck: no adenopathy, no JVD, supple, symmetrical, trachea midline and thyroid not enlarged  Lungs: clear to auscultation bilaterally  Heart: regular rate and rhythm  Abdomen: incision healed, no skin bruising, no pain, hernia well repaired, still has a small left inguinal hernia  Extremities: 1 + leg edema  Skin: no open wounds    Assessment:    Doing well from the open right sided hernia repair, still has a small left inguinal hernia which does not hurt but does swell when he strains. Still receiving therapy for the metastatic rectal cancer. Plan:   Follow up 12 weeks.     Physician Signature: Electronically signed by Dr. Adama Haddad MD

## 2021-10-19 ENCOUNTER — HOSPITAL ENCOUNTER (OUTPATIENT)
Dept: INFUSION THERAPY | Age: 71
Discharge: HOME | End: 2021-10-19
Payer: MEDICARE

## 2021-10-19 DIAGNOSIS — C78.7 RECTAL CANCER METASTASIZED TO LIVER (HCC): Primary | ICD-10-CM

## 2021-10-19 DIAGNOSIS — C20 RECTAL CANCER METASTASIZED TO LIVER (HCC): Primary | ICD-10-CM

## 2021-10-19 PROCEDURE — 6360000002 HC RX W HCPCS: Performed by: INTERNAL MEDICINE

## 2021-10-19 PROCEDURE — 96523 IRRIG DRUG DELIVERY DEVICE: CPT

## 2021-10-19 PROCEDURE — 2580000003 HC RX 258: Performed by: INTERNAL MEDICINE

## 2021-10-19 RX ORDER — SODIUM CHLORIDE 0.9 % (FLUSH) 0.9 %
10 SYRINGE (ML) INJECTION PRN
Status: DISCONTINUED | OUTPATIENT
Start: 2021-10-19 | End: 2021-10-20 | Stop reason: HOSPADM

## 2021-10-19 RX ORDER — HEPARIN SODIUM (PORCINE) LOCK FLUSH IV SOLN 100 UNIT/ML 100 UNIT/ML
500 SOLUTION INTRAVENOUS PRN
Status: DISCONTINUED | OUTPATIENT
Start: 2021-10-19 | End: 2021-10-20 | Stop reason: HOSPADM

## 2021-10-19 RX ORDER — SODIUM CHLORIDE 0.9 % (FLUSH) 0.9 %
10 SYRINGE (ML) INJECTION PRN
Status: CANCELLED | OUTPATIENT
Start: 2021-10-19

## 2021-10-19 RX ORDER — HEPARIN SODIUM (PORCINE) LOCK FLUSH IV SOLN 100 UNIT/ML 100 UNIT/ML
500 SOLUTION INTRAVENOUS PRN
Status: CANCELLED | OUTPATIENT
Start: 2021-10-19

## 2021-10-19 RX ADMIN — SODIUM CHLORIDE, PRESERVATIVE FREE 10 ML: 5 INJECTION INTRAVENOUS at 09:40

## 2021-10-19 RX ADMIN — Medication 500 UNITS: at 09:40

## 2021-10-19 NOTE — PROGRESS NOTES
Patient presents to clinic for [facility] today. L  SQ port accessed per policy using 20 G 3.43\" Campos needle for good blood return. Site flushed easily with 10 mL NSS followed by 5 mL Heparin solution 100 units/ml rinse prior to de-access. Dry sterile dressing to area. Tolerated procedure well. Encouraged to schedule port flush every 4 weeks.

## 2021-10-25 ENCOUNTER — HOSPITAL ENCOUNTER (OUTPATIENT)
Age: 71
Discharge: HOME | End: 2021-10-25
Payer: MEDICARE

## 2021-10-25 ENCOUNTER — HOSPITAL ENCOUNTER (OUTPATIENT)
Dept: INFUSION THERAPY | Age: 71
Discharge: HOME | End: 2021-10-25
Payer: MEDICARE

## 2021-10-25 DIAGNOSIS — C20 RECTAL CANCER METASTASIZED TO LIVER (HCC): ICD-10-CM

## 2021-10-25 DIAGNOSIS — C78.7 RECTAL CANCER METASTASIZED TO LIVER (HCC): ICD-10-CM

## 2021-10-25 LAB
ALBUMIN SERPL-MCNC: 3.7 G/DL (ref 3.5–5.2)
ALP BLD-CCNC: 137 U/L (ref 40–129)
ALT SERPL-CCNC: 15 U/L (ref 0–40)
ANION GAP SERPL CALCULATED.3IONS-SCNC: 8 MMOL/L (ref 7–16)
AST SERPL-CCNC: 27 U/L (ref 0–39)
BASOPHILS ABSOLUTE: 0.03 E9/L (ref 0–0.2)
BASOPHILS RELATIVE PERCENT: 0.9 % (ref 0–2)
BILIRUB SERPL-MCNC: 0.7 MG/DL (ref 0–1.2)
BUN BLDV-MCNC: 27 MG/DL (ref 6–23)
CALCIUM SERPL-MCNC: 8.8 MG/DL (ref 8.6–10.2)
CEA: 3.2 NG/ML (ref 0–5.2)
CHLORIDE BLD-SCNC: 106 MMOL/L (ref 98–107)
CO2: 26 MMOL/L (ref 22–29)
CREAT SERPL-MCNC: 1.1 MG/DL (ref 0.7–1.2)
EOSINOPHILS ABSOLUTE: 0.15 E9/L (ref 0.05–0.5)
EOSINOPHILS RELATIVE PERCENT: 4.3 % (ref 0–6)
GFR AFRICAN AMERICAN: >60
GFR NON-AFRICAN AMERICAN: >60 ML/MIN/1.73
GLUCOSE BLD-MCNC: 120 MG/DL (ref 74–99)
HCT VFR BLD CALC: 30.1 % (ref 37–54)
HEMOGLOBIN: 9.7 G/DL (ref 12.5–16.5)
LYMPHOCYTES ABSOLUTE: 0.65 E9/L (ref 1.5–4)
LYMPHOCYTES RELATIVE PERCENT: 18.1 % (ref 20–42)
MCH RBC QN AUTO: 31.3 PG (ref 26–35)
MCHC RBC AUTO-ENTMCNC: 32.2 % (ref 32–34.5)
MCV RBC AUTO: 97.1 FL (ref 80–99.9)
MONOCYTES ABSOLUTE: 0.18 E9/L (ref 0.1–0.95)
MONOCYTES RELATIVE PERCENT: 5.2 % (ref 2–12)
NEUTROPHILS ABSOLUTE: 2.59 E9/L (ref 1.8–7.3)
NEUTROPHILS RELATIVE PERCENT: 71.6 % (ref 43–80)
OVALOCYTES: ABNORMAL
PDW BLD-RTO: 15.3 FL (ref 11.5–15)
PLATELET # BLD: 84 E9/L (ref 130–450)
PLATELET CONFIRMATION: NORMAL
PMV BLD AUTO: 10.3 FL (ref 7–12)
POIKILOCYTES: ABNORMAL
POTASSIUM SERPL-SCNC: 3.9 MMOL/L (ref 3.5–5)
RBC # BLD: 3.1 E12/L (ref 3.8–5.8)
SODIUM BLD-SCNC: 140 MMOL/L (ref 132–146)
TOTAL PROTEIN: 7.1 G/DL (ref 6.4–8.3)
WBC # BLD: 3.6 E9/L (ref 4.5–11.5)

## 2021-10-25 PROCEDURE — 80053 COMPREHEN METABOLIC PANEL: CPT

## 2021-10-25 PROCEDURE — 85025 COMPLETE CBC W/AUTO DIFF WBC: CPT

## 2021-10-25 PROCEDURE — 82378 CARCINOEMBRYONIC ANTIGEN: CPT

## 2021-10-25 PROCEDURE — 36415 COLL VENOUS BLD VENIPUNCTURE: CPT

## 2021-10-26 ENCOUNTER — HOSPITAL ENCOUNTER (OUTPATIENT)
Dept: INFUSION THERAPY | Age: 71
Discharge: HOME | End: 2021-10-26
Payer: MEDICARE

## 2021-10-26 ENCOUNTER — OFFICE VISIT (OUTPATIENT)
Dept: ONCOLOGY | Age: 71
End: 2021-10-26
Payer: MEDICARE

## 2021-10-26 VITALS
HEIGHT: 73 IN | OXYGEN SATURATION: 100 % | BODY MASS INDEX: 26.15 KG/M2 | SYSTOLIC BLOOD PRESSURE: 151 MMHG | DIASTOLIC BLOOD PRESSURE: 69 MMHG | HEART RATE: 70 BPM | WEIGHT: 197.3 LBS

## 2021-10-26 DIAGNOSIS — C20 RECTAL CANCER (HCC): Primary | ICD-10-CM

## 2021-10-26 PROCEDURE — 99214 OFFICE O/P EST MOD 30 MIN: CPT | Performed by: INTERNAL MEDICINE

## 2021-10-26 PROCEDURE — 99213 OFFICE O/P EST LOW 20 MIN: CPT

## 2021-10-26 NOTE — PROGRESS NOTES
[place] [id number]  Attending Clinic Note     Reason for Visit: Follow-up on a patient with Metastatic Rectal Cancer.     PCP: Sara Estes MD     History of Present Illness:  67 y/o  male who was referred to see Dr. Angel Abraham (GI team) for evaluation of bright red blood per rectum, mild anemia and change in bowel habits with diarrhea. CEA 2640 on 10/19/2015. AlcP 299 AST 57 ALT 75 on 10/19/2015. Colonoscopy in 2013 noted no significant polyps, colitis or lesions at that time. Denies any Family History of colorectal cancer or polyps.     Colonoscopy on 10/19/2015 revealed:  1. Ascending polyp, 8 mm, hot snare: Tubulovillous adenoma. 2. Transverse polyp, 1 cm, hot snare: Serrated polyp most consistent with sessile serrated adenoma. 3. Five splenic flexure polyps, three - 5 mm, 7 mm, 8 mm, hot snare and biopsy: Four segments of Tubular Adenoma  4. Descending polyp, 5 mm, biopsy: Tubular adenoma. 5. Sigmoid polyp, 4 mm biopsy, Serrated polyp most consistent with sessile serrated adenoma. 6. Two rectal polyps, 4 mm biopsy: Serrated polyp most consistent with hyperplastic polyp. 7. Rectosigmoid colon mass (large mass approximately 65% circumference of the lumen; Very friable, firm and hard): Tubulovillous adenoma with associated focal erosion and fibroplasia.      CT scan abdomen/pelvis on 10/26/2015:  1. Small nodules at lung bases likely represent metastatic colon   cancer. 2. Extensive likely metastatic colon cancer throughout the liver.      3. Mild mural thickening and luminal narrowing in the   terminal ileum, otherwise nonspecific.      Colonoscopy with snare removal rectal mass was performed by Dr. Segundo Shine. Pathology proved:  Rectal polyp: Invasive adenocarcinoma involving villous adenoma and extending to the cauterized edge of excision. KRAS Mutation: Mutation detected. BRAF Mutation: Mutation not detected.   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     CEA 3488. Bone scan on 11/10/2015 noted no metastatic disease. CT chest on 11/10/2015 revealed Multiple pulmonary nodules in the upper and lower lobes consistent with metastatic disease;   Hepatic metastasis also visualized. 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 Mediport was placed. Cycle # 1 of FOLFOX + Avastin was on 11/23/2015. CEA was 4555 on 11/23/2015. Cycle # 2 of FOLFOX + Avastin was on 12/08/2015. CEA was 3160 on 12/08/2015. Cycle # 3 of FOLFOX + Avastin was on 12/22/2015. CEA was 2516 on 12/21/2015. Cycle # 4 of FOLFOX + Avastin was on 01/05/2016. CEA was 2098 on 01/05/2015. Cycle # 5 of FOLFOX + Avastin was on 01/19/2016. CEA was 1511 on 01/05/2015.     -Bone scan on 01/26/2016 noted no metastatic disease. CT chest on 01/26/2016 revealed Significant response to treatment with no visible residual nodules. CT scan abdomen/pelvis on 01/26/2016 noted Interval decreased size of multiple masses in the liver compatible with treatment response. Continue another 2 months of FOLFOX + Avastin and repeat scans. Cycle # 6 of FOLFOX + Avastin was on 02/02/2016.  on 02/02/2016. Cycle # 7 of FOLFOX + Avastin was on 02/16/2016.  on 02/16/2016. Cycle # 8 of FOLFOX + Avastin was on 03/01/2016.  on 03/01/2016. Cycle # 9 of FOLFOX + Avastin was on 03/15/2016.  on 03/15/2016. Cycle # 10 of FOLFOX + Avastin was on 03/29/2016. .4 on 03/29/2016. Cycle # 11 of FOLFOX + Avastin was on 04/12/2016. .4 on 04/12/2016.     Re-staging scans on 04/19/2016: CT Chest: clear lungs; no evidence of recurring pulmonary nodule; CT Abdomen/Pelvis: Further interval decrease in size of the multiple metastatic hepatic lesions, the largest lesion now measures 3.9 x 3.5 cm and previously measured 4.5 cm in maximum diameter.  No mesenteric lymphadenopathy is identified; Bone Scan: No evidence of osseous metastasis. Continue same regimen and re-stage in 2-3 months. Cycle # 12 FOLFOX + Avastin was on 04/26/2016. .5 on 04/26/2016. Cycle # 13 FOLFOX + Avastin was on 05/10/2016. .3 on 05/10/2016. Cycle # 14 FOLFOX+AVASTIN was on 05/24/2016. .5 on 05/24/2016. Cycle # 15 FOLFOX + Avastin was on 06/07/2016. CEA 90.5 on 06/07/2016. Ad[facility]13[facility]2016-06/16/2016 for abdominal pain: EGD noted 1.5 cm clean based duodenal bulb ulceration s/p epinephrine and bicap per Dr. Joanne Stevens. No active bleeding. A. Stomach, biopsy: Mild chronic gastritis, immunostain negative for Helicobacter  B. Esophagus, biopsy: Gastric glandular mucosa with prominent intestinal metaplasia ([place]'s epithelium), negative for epithelial dysplasia, esophageal squamous mucosa not identified  Cycle # 16 FOLFOX (discontinued avastin (bevacizumab) given association of peptic ulcer disease and known association of GI perforation) was on 06/21/2016. Cycle # 17 FOLFOX was on 07/05/2016. CEA 52.6 on 07/19/2016. Cycle # 17 FOLFOX was on 07/05/2016. CEA 52.6 on 07/05/2016. CEA 47.6 on 07/19/2016.     Re-staging scans 07/19/2106: CT Chest negative for metastatic disease. CT Abdomen/Pelvis: Stable hepatic lesions; Question new mural thickening in the cecum. Bone Scan: New Let hip lesion suspicious for bone metastasis.     Increased CEA; new mural thickening in the cecum and new left hip lesion suspicious for bone metastasis are consistent with disease progression; He derived maximum benefit from FOLFOX/AVASTIN. D/C FOLFOX/AVASTIN. We recommended FOLFIRI second line therapy. Cycle # 1 FOLFIRI was on 08/02/2016. CEA 40.8 on 08/02/2016. Xgeva q4 weeks started on 08/02/2016. Cycle # 2 FOLFIRI was on 08/16/2016. CEA 31.7 on 08/16/2016. Cycle # 3 FOLFIRI (added Avastin) was on 08/30/2016 given that ulcers healed on EGD 08/15/2016 by Dr. Tc Ward; Protonix bid since avastin re-started.    Colonoscopy on 08/29/2016 (to look at the cecal area) unremarkable. CEA 26.7 on 08/30/2016. Cycle # 4 FOLFIRI/Avastin was on 09/13/2016. CEA 23.4 on 09/13/2016. Cycle # 5 FOLFIRI/Avastin was on 09/27/2016. CEA 22.3 on 09/27/2016. Cycle # 6 FOLFIRI/Avastin was on 10/11/2016. CEA 18.4 on 10/11/2016.      Bone scan 10/21/2016 stable bone metastasis; ? New lesion anterior left sixth rib likely interval healing fracture. CT abdomen/pelvis revealed stable hepatic metastasis. CT chest negative for metastatic disease. Continue FOLFIRI/Avastin and repeat scans in 3 months. Cycle # 7 FOLFIRI/Avastin was on 10/25/2016. CEA 16.1  Cycle # 8 FOLFIRI/Avastin was on 11/08/2016. CEA 15.7  Cycle # 9 FOLFIRI/Avastin was on 11/29/2016. CEA 13.6 on 11/29/2016. Cycle # 10 FOLFIRI/Avastin was on 12/13/2016. CEA 10.6 on 12/13/2016. Cycle # 11 FOLFIRI/Avastin was on 12/27/2016. CEA 11.1 on 12/27/2016. Cycle # 12 FOLFIRI/Avastin was on 01/10/2017. CEA 9.7 on 01/10/2017.       CT chest 01/23/2017 No new pulmonary nodule or lymphadenopathy. Patchy groundglass opacities within the left lower lobe, suggestive of infectious or inflammatory etiology. Followup CT chest in 3 months. Slight increased linear sclerosis along the left anterior sixth rib corresponding to an area of increased uptake on the prior bone scan from 10/21/2016, favored to be related to interval healing changes of a nondisplaced fracture. CT abdomen/pelvis on 01/23/2017 Redemonstration of multiple hepatic metastases, which are similar compared to the prior CT from 10/21/2016. Redemonstration of area of increased sclerosis along the right acetabulum suspicious for metastatic disease. Bone scan on 01/23/2017 Stable subtle uptake within the left proximal diaphysis, again suggestive of metastatic disease  Decreased subtle uptake within the medial right acetabulum.    Decreased uptake within the left anterior sixth rib corresponding to linear sclerosis on the recent CT, favored to be related to an joint rib fracture. Continue FOLFIRI + Avastin and repeat scans in 3 months. Cycle # 13 FOLFIRI + Avastin was on 01/24/2017. Cycle # 14 FOLFIRI + Avastin was on 02/07/2017. Cycle # 15 FOLFIRI + Avastin was on 02/21/2017. Cycle # 16 FOLFIRI + Avastin was on 03/07/2017. Cycle # 17 FOLFIRI + Avastin was on 03/21/2017. Cycle # 18 FOLFIRI + Avastin was on 04/04/2017. CT chest 04/17/2017 negative for metastatic disease. CT abdomen/pelvis 04/17/2017 Stable hypodense metastatic lesions within the liver. Stable area of increased sclerosis along the right acetabulum  Bone scan 04/17/2017 Stable subtle uptake within the left proximal femoral diaphysis; No definite abnormal uptake in the region of a sclerotic lesion along the posterior column of the right acetabulum noted on CT. Stable slight uptake within the left anterior sixth rib corresponding to linear sclerosis on the recent CT, favored to be related to a fracture. Continue FOLFIRI + Avastin and repeat scans in 3 months. Cycle # 19 FOLFIRI + Avastin was on 04/18/2017. Cycle # 20 FOLFIRI + Avastin was on 05/02/2017. Cycle # 21 FOLFIRI + Avastin was on 05/16/2017. Cycle # 22 FOLFIRI + Avastin was on 05/30/2017. Cycle # 23 FOLFIRI + Avastin was on 06/13/2017. Cycle # 24 FOLFIRI + Avastin was on 06/27/2017. Cycle # 25 FOLFIRI + Avastin was on 07/11/2017. Cycle # 26 FOLFIRI + Avastin was on 07/25/2017. Cycle # 27 FOLFIRI + Avastin was on 08/08/2017. Cycle # 28 FOLFIRI + Avastin was on 08/22/2017. Cycle # 29 FOLFIRI + Avastin was on 09/05/2017. Cycle # 30 FOLFIRI + Avastin was on 09/19/2017. Bone scan 09/26/2017 stable. CT abdomen/pelvis on 09/26/2017 Stable hypodense mass in the liver. Stable sclerotic lesion in the acetabulum. CT chest 09/26/2017 negative for metastatic disease. Cycle # 31 FOLFIRI + Avastin was on 10/03/2017. CEA 7.4 on 10/03/2017. Cycle # 32 FOLFIRI + Avastin was on 10/17/2017. CEA 6.0 on 10/17/2017.   Cycle # 33 FOLFIRI + Avastin was on 10/31/2017. CEA 6.8 on 10/31/2017. Cycle # 34 FOLFIRI + Avastin was on 11/14/2017. CEA 7.2 on 11/14/2017. Cycle # 35 FOLFIRI + Avastin was on 11/28/2017. CEA 5.1 on 11/28/2017. Cycle # 36 FOLFIRI + Avastin was on 12/12/2017. CEA 6.1 on 12/12/2017. Bone scan 12/22/2017 noted no evidence of metastatic disease to the axial or appendicular skeleton. CT chest 12/22/2017 noted no evidence of metastatic disease. CT abdomen/pelvis 12/22/2017 noted stable hypodense lesions in the liver. Continue FOLFIRI + Avastin and repeat scans in 3 months. Cycle # 37 FOLFIRI + Avastin was on 12/27/2018. Cycle # 38 FOLFIRI + Avastin was on 01/09/2018. Cycle # 39 FOLFIRI + Avastin was on 01/23/2018. Cycle # 40 FOLFIRI + Avastin was on 02/06/2018. Cycle # 41 FOLFIRI + Avastin was on 02/20/2018. Cycle # 42 FOLFIRI + Avastin was on 03/06/2018. Cycle # 43 FOLFIRI + Avastin was on 03/20/2018. Bone scan on 03/26/2018 negative for metastatic disease. CT chest 03/26/2018 noted ? new 7 mm nodule in LUCY. CT abdomen/pelvis 03/26/2018 noted stable hypodense lesions in the liver. ? New small ascites in the abdomen. Patient wants to continue to monitor the lung finding and repeat scans in 2 months instead of changing the current regimen into oral Lonsurf. Cycle # 44 FOLFIRI + Avastin was on 04/03/2018. CEA 6.3 on 04/03/2018. Cycle # 45 FOLFIRI + Avastin was on 04/24/2018. CEA 5.3 on 04/24/2018. Cycle # 46 FOLFIRI + Avastin was on 05/08/2018. CEA 5.4 on 05/08/2018. Cycle # 47 FOLFIRI + Avastin was on 05/22/2018. CEA 6.1 on 05/22/2018. CT chest 05/31/2018 noted stable nodule in LUCY. No evidence of worsening malignancy. CT abdomen/pelvis on 05/31/2018 noted stable liver metastasis. No evidence of worsening malignancy. Continue FOLFIRI + Avastin and repeat scans in 3 months. Cycle # 48 FOLFIRI + Avastin was on 06/06/2018. Cycle # 49 FOLFIRI + Avastin was on 06/19/2018.    Cycle # 50 FOLFIRI + Avastin was on 07/03/2018. Cycle # 51 FOLFIRI + Avastin was on 07/24/2018. Cycle # 52 FOLFIRI + Avastin was on 08/14/2018. Cycle # 53 FOLFIRI + Avastin was on 08/28/2018. CT chest 09/06/2018 noted interval decrease in size of LUCY measuring up to 4 mm, suggestive of treatment response. No mediastinal or hilar LN  CT abdomen/pelvis 09/06/2018 Slight interval increase in size of a previously noted metastatic lesion within the right hepatic lobe. This may be related to differences in phase of enhancement compared to the most recent study from 5/31/2018, the lesion remains decreased in size compared to the more previous studies. No abdominal, retroperitoneal, or pelvic lymphadenopathy. Continue FOLFIRI + Avastin and repeat scans in 2 months. Cycle # 54 FOLFIRI + Avastin was on 09/11/2018. Cycle # 55 FOLFIRI + Avastin was on 09/25/2018. Cycle # 56 FOLFIRI + Avastin was on 10/09/2018. Cycle # 57 FOLFIRI + Avastin was on 10/23/2018. CT chest 11/02/2018 stable 3 mm nodule within LUCY. No new right and left lung nodule. No mediastinal or osseous lesion. CT abdomen/pelvis 11/02/2018 stable metastatic disease to liver. No new metastatic disease identified. No mesenteric or retroperitoneal LN. No gross colonic lesion identified. Continue FOLFIRI + Avastin and repeat scans in 3 months. Cycle # 58 FOLFIRI + Avastin was on 11/06/2018. CEA 7.6 on 11/05/2018. Cycle # 59 FOLFIRI + Avastin was on 11/27/2018. CEA 6.9 on 11/26/2018. Cycle # 60 FOLFIRI + Avastin was on 12/11/2018. CEA 6.7 on 12/11/2018. Cycle # 61 FOLFIRI + Avastin was on 01/08/2019. CEA 5.6 on 01/07/2019. Cycle # 62 FOLFIRI + Avastin was on 01/29/2019. CEA 4.6 on 01/28/2019. Cycle # 63 FOLFIRI + Avastin was on 02/12/2019. CEA 4.3 on 02/11/2019. CT chest 02/21/2019 no evidence of metastatic disease. CT abdomen/pelvis 02/21/2019 stable hypodense liver lesions. No evidence of worsening metastatic disease.  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 Ordered MRI thoracic spine and referred to neurosurgery team.    Cycle # 64 FOLFIRI + Avastin was on 02/26/2019. CEA 4.7 on 02/25/2019. Cycle # 65 FOLFIRI + Avastin was on 03/12/2019. CEA 4.0 on 03/11/2019. Cycle # 66 FOLFIRI + Avastin was on 03/26/2019. CEA 4.7 on 03/25/2019. Cycle # 67 FOLFIRI + Avastin was on 04/09/2019. CEA 5.6 on 04/08/2019. Cycle # 68 FOLFIRI + Avastin was on 04/30/2019. CEA 4.9 on 04/29/2019. Cycle # 69 FOLFIRI + Avastin was on 05/14/2019. CEA 5.3 on 05/14/2019. CT chest 05/23/2019 stable small lung nodule with no evidence of metastatic disease. CT abdomen/pelvis 05/23/2019 Decrease conspicuity of the liver lesions. No new lesions seen. Stable splenomegaly. Continue FOLFIRI + Avastin and repeat scans in 3 months. Cycle # 70 FOLFIRI + Avastin was on 06/04/2019. CEA 4.8 on 06/03/2019. Cycle # 71 FOLFIRI + Avastin was on 06/18/2019. CEA 4.6 on 06/17/2019. Cycle # 72 FOLFIRI + Avastin was on 07/09/2019. CEA 4.3 on 07/08/2019. Cycle # 73 FOLFIRI + Avastin was on 07/30/2019. CEA 5.0 on 07/29/2019. Cycle # 74 FOLFIRI + Avastin was on 08/13/2019. CEA 5.0 on 08/12/2019. CT chest 08/20/2019 negative  CT abdomen/pelvis 08/20/2019 Previous identified hepatic lesions are not visualized on the current exam.  Continue FOLFIRI + Avastin and repeat scans in 3 months. Cycle # 75 FOLFIRI + Avastin was on 08/27/2019. CEA 5.0 on 08/26/2019. Cycle # 76 FOLFIRI + Avastin was on 09/17/2019. CEA 5.5 on 09/16/2019. Cycle # 77 FOLFIRI + Avastin was on 10/15/2019. CEA 4.6 on 10/15/2019. Cycle # 78 FOLFIRI + Avastin was on 10/29/2019. CEA 4.1 on 10/28/2019. Cycle # 79 FOLFIRI + Avastin was on 11/12/2019. CEA 4.3 on 11/12/2019. Cycle # 80 FOLFIRI + Avastin was on 12/10/2019. CEA 4.0 on 12/09/2019.   CT chest 12/19/2019 Stable 3 mm nodule within the left upper lobe, similar to the previous studies dating back to 11/2/2018, however decreased in size compared to the CT from 3/26/2018. No thoracic LN. CT abdomen/pelvis 12/19/2019 Previously described small hypodense lesions are more conspicuous on the current study compared to the recent study throughout the liver from 8/20/2019, however similar to the prior study from 2/21/2019. Findings may be related to differences in contrast opacification. No abdominal or pelvic lymphadenopathy. Continue FOLFIRI + Avastin and repeat scans in 2 months to f/u on liver lesions. Cycle # 81 FOLFIRI + Avastin was on 01/07/2020. CEA 3.8 on 01/06/2020. Cycle # 82 FOLFIRI + Avastin was on 01/21/2020. CEA 3.7 on 01/20/2020. Cycle # 83 FOLFIRI + Avastin was on 02/04/2020. CEA 4.1 on 02/03/2020. Cycle # 84 FOLFIRI + Avastin was on 02/18/2020. CEA 4.6 on 02/17/2020. EGD by Dr. Ezequiel Nagel 03/02/2020 showing no masses or lesions  Cycle # 85 FOLFIRI + Avastin was on 03/03/2020. CEA 7.4 on 03/02/2020. Cycle # 86 FOLFIRI + Avastin was on 03/17/2020. CEA 4.5 on 03/16/2020. Colonoscopy on 03/23/2020 by Dr. Ezequiel Nagel unremarkable (records requested). Cycle # 87 FOLFIRI + Avastin was on 03/31/2020. CEA 4.1 on 03/30/2020. Cycle # 88 FOLFIRI + Avastin was on 04/21/2020. CEA 6.4 on 04/20/2020. Cycle # 89 FOLFIRI + Avastin was on 05/05/2020. CEA 5.8 on 05/04/2020. Cycle # 90 FOLFIRI + Avastin was on 06/02/2020. CEA 5.5 on 06/01/2020. Cycle # 91 FOLFIRI + Avastin was on 06/16/2020. CEA 5.6 on 06/15/2020. CT chest 06/23/2020 noted no metastatic disease in the chest.   CT abdomen/pelvis 06/23/2020 Stable small liver lesions measuring up to 5 mm since 2019.   22 mm round mass in segment 8 of the liver with central high density stable from December 2019), previously measuring up to 34 mm in 2017. No additional metastatic disease in the abdomen or pelvis. Small amount of ascites. Overall stable disease. Continue FOLFIRI + Avastin and repeat scans in 2-3 months. Cycle # 92 FOLFIRI + Avastin was on 07/07/2020. CEA 5.7 on 07/06/2020. Cycle # 93 FOLFIRI + Avastin was on 07/21/2020. CEA 5.9 on 07/20/2020. Cycle # 94 FOLFIRI + Avastin was on 08/04/2020. CEA 5.5 on 08/04/2020. Cycle # 95 FOLFIRI + Avastin was on 08/25/2020. CEA 6.1 on 08/24/2020. CT chest 9/2/2020 stable unremarkable enhanced CT of the thorax. There is no metastatic disease to the lungs. CT abdomen pelvis on 9/2/2020 stable 2.2 cm low attenuated lesion within the central aspect of the right hepatic lobe favored to represent treated metastatic disease. No new hepatic lesion is identified. Stable splenomegaly  There is no appreciable colonic lesion or stricture  Pericholecystic fluid of uncertain etiology. General surgery team consulted. Laparoscopic cholecystectomy with normal cholangiogram 10/27/20  Gallbladder: Chronic cholecystitis and cholelithiasis.  Unremarkable regional lymph node. 11/13/2020; Seen by Dr. Tomas Franklin from General surgery team; cleared to re-start chemotherapy. Cycle # 96 FOLFIRI + Avastin was on 11/17/2020. CEA 3.6 on 11/16/2020. Cycle # 97 FOLFIRI + Avastin was on 12/01/2020. CEA 3.5 on 11/30/2020. Cycle # 98 FOLFIRI + Avastin was on 12/15/2020. CEA 4.3 on 12/15/2020. Cycle # 99 FOLFIRI + Avastin was on 01/05/2021. CEA 4.7 on 01/04/2021. CT chest 01/13/2021 negative for metastatic disease. CT abdomen/pelvis 01/13/2021 Unchanged central hepatic lesion. No specific signs of abdominopelvic metastatic disease. Continue FOLFIRI + Avastin and repeat scans in 3 months. Cycle # 100 FOLFIRI + Avastin was on 01/19/2021. CEA 4.7 on 01/18/2021. Cycle # 101 FOLFIRI + Avastin was on 02/02/2021. CEA 5.2 on 02/01/2021. Cycle # 102 FOLFIRI + Avastin was on 02/16/2021. CEA 5.6 on 02/15/2021. Cycle # 103 FOLFIRI + Avastin was on 03/02/2021. Cycle # 104 FOLFIRI (20% dose reduced due to significant toxicity) + Avastin was on 03/16/2021. Cycle # 105 FOLFIRI (same dose as cycle # 104) + Avastin was on 03/30/2021. CEA 6.5 on 03/29/2021.   Cycle # 106 FOLFIRI (same dose as cycle # 104) + Avastin was on 04/13/2021. CEA 6.0 on 04/12/2021  CT chest 04/20/2021 no evidence of metastatic disease. CT abdomen/pelvis 04/20/2021 No evidence of progressive metastatic disease. Stable 2 cm lesion in the right lobe of the liver, likely representing treated metastatic disease. Imaging reviewed. Continue FOLFIRI + Avastin and repeat scans in 3 months  Cycle # 107 FOLFIRI + Avastin was on 04/27/2021  Cycle # 108 FOLFIRI + Avastin was on 05/11/2021. Cycle # 109 FOLFIRI (held Avastin due to upcoming surgery) on 05/25/2021. CEA 6.3 on 5/24/21  Cycle # 110 FOLFIRI (held Avastin due to upcoming surgery) on 06/08/2021. CEA 6.3 on 6/07/21. Right inguinal/scrotol hernia repair on 07/13/2021 with Dr Rhonda Tubbs with folely removal on Friday 07/23/2021. He developed fever on 07/24/2021 and was brought to [address] [place] [address] ER via EMS; Sepsis secondary to urinary tract infection   Completed Abx  CEA 4.1 on 08/30/2021  Cycle # 111 FOLFIRI was on 08/31/2021. HBP team for evaluation of liver lesions; His lesion does appear to be a hemangioma as his prior metastatic deposits in his liver were hypo attenuating compared to this CT which is hyper attenuating. compared to his prior CT scans he has disappearing liver metastasis. MRI liver on 09/17/2021 No suspicious liver lesion identified. Colonoscopy GI Dr. Panda Vaca on 09/20/2021 unremarkable  CEA 3.3 on 09/27/2021. Today 10/26/2021 for f/u. No fever, chills. No nausea/vomiting. Fair appetite and energy level. Review of Systems;  CONSTITUTIONAL: No fever, chills. Fair appetite and energy level  ENMT: Eyes: No diplopia; Nose: No epistaxis. Mouth:No lesions  RESPIRATORY: No hemoptysis, shortness of breath. CARDIOVASCULAR: No chest pain, palpitations. GASTROINTESTINAL:No N/V, abdominal pain. GENITOURINARY: No dysuria, urinary frequency, hematuria. NEURO: No syncope, presyncope, headache. Remainder ROS: Negative.     Past Medical History: Past Medical History               Diagnosis Date    Arthritis      Cancer ([facility] [address]         colon    Depression      Hyperlipidemia      Hypertension           Medications:  Reviewed and reconciled.     Allergies: Allergies   Allergen Reactions    Neosporin [Neomycin-Polymyxin-Gramicidin] Rash    Tape Reuben Boss Tape] Rash      Physical Exam:  BP (!) 151/69   Pulse 70   Ht 6' 1\" (1.854 m)   Wt 197 lb 4.8 oz (89.5 kg)   SpO2 100%   BMI 26.03 kg/m²   GENERAL: Alert, oriented x 3, not in distress  HEENT: PERRLA, EOMI. NECK: Supple. Without lymphadenopathy. LUNGS: CTA bilaterally, with no wheezing, crackles or rhonchi. CARDIOVASCULAR: Regular rhythm. No murmurs, rubs or gallops. ABDOMEN: Soft. Non-tender, non-distended. Positive bowel sounds. hernia  EXTREMITIES: Without clubbing, cyanosis or edema. NEUROLOGIC: No focal deficits. ECOG PS 1    Diagnostics:  Lab Results   Component Value Date    WBC 3.6 (L) 10/25/2021    HGB 9.7 (L) 10/25/2021    HCT 30.1 (L) 10/25/2021    MCV 97.1 10/25/2021    PLT 84 (L) 10/25/2021     Lab Results   Component Value Date     10/25/2021    K 3.9 10/25/2021     10/25/2021    CO2 26 10/25/2021    BUN 27 (H) 10/25/2021    CREATININE 1.1 10/25/2021    GLUCOSE 120 (H) 10/25/2021    CALCIUM 8.8 10/25/2021    PROT 7.1 10/25/2021    LABALBU 3.7 10/25/2021    BILITOT 0.7 10/25/2021    ALKPHOS 137 (H) 10/25/2021    AST 27 10/25/2021    ALT 15 10/25/2021    LABGLOM >60 10/25/2021    GFRAA >60 10/25/2021     Lab Results   Component Value Date    CEA 3.2 10/25/2021     Impression/Plan:  67 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 KRAS Mutation: Mutation detected. BRAF Mutation: Mutation not detected. 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 CT scan abdomen/pelvis on 10/26/2015 revealed small nodules at the lung bases, extensive liver lesions consistent with metastatic rectosigmoid cancer. CEA 3488 on 10/30/2015. Bone scan on 11/10/2015 noted no metastatic disease. CT chest on 11/10/2015 revealed Multiple pulmonary nodules in the upper and lower lobes consistent with metastatic disease; Hepatic metastasis also visualized. 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 Mediport was placed. Cycle # 1 of FOLFOX + Avastin was on 11/23/2015. CEA was 4555 on 11/23/2015. Cycle # 2 of FOLFOX + Avastin was on 12/08/2015. CEA was 3160 on 12/08/2015. Cycle # 3 of FOLFOX + Avastin was on 12/22/2015. CEA was 2516 on 12/21/2015. Cycle # 4 of FOLFOX + Avastin was on 01/05/2016. CEA was 2098 on 01/05/2015. Cycle # 5 of FOLFOX + Avastin was on 01/19/2016. CEA was 1511 on 01/05/2015.     -Bone scan on 01/26/2016 noted no metastatic disease. CT chest on 01/26/2016 revealed Significant response to treatment with no visible residual nodules. CT scan abdomen/pelvis on 01/26/2016 noted Interval decreased size of multiple masses in the liver compatible with treatment response. Continue another 2 months of FOLFOX + Avastin and repeat scans. Cycle # 6 of FOLFOX + Avastin was on 02/02/2016.  on 02/02/2016. Cycle # 7 of FOLFOX + Avastin was on 02/16/2016.  on 02/16/2016. Cycle # 8 of FOLFOX + Avastin was on 03/01/2016.  on 03/01/2016. Cycle # 9 of FOLFOX + Avastin was on 03/15/2016.  on 03/15/2016. Cycle # 10 of FOLFOX + Avastin was on 03/29/2016. .4 on 03/29/2016. Cycle # 11 of FOLFOX + Avastin was on 04/12/2016. .4 on 04/12/2016.     Re-staging scans on 04/19/2016: CT Chest: clear lungs; no evidence of recurring pulmonary nodule; CT Abdomen/Pelvis: Further interval decrease in size of the multiple metastatic hepatic lesions, the largest lesion now measures 3.9 x 3.5 cm and previously measured 4.5 cm in maximum diameter. No mesenteric lymphadenopathy is identified; Bone Scan: No evidence of osseous metastasis.   Continue same regimen and re-stage in 2-3 months. Cycle # 12 FOLFOX + Avastin was on 04/26/2016. .5 on 04/26/2016. Cycle # 13 FOLFOX + Avastin was on 05/10/2016. .3 on 05/10/2016. Cycle # 14 FOLFOX+AVASTIN was on 05/24/2016. .5 on 05/24/2016. Cycle # 15 FOLFOX + Avastin was on 06/07/2016. CEA 90.5 on 06/07/2016. Admitted to [facility] 06/13/2016-06/16/2016 for abdominal pain: EGD noted 1.5 cm clean based duodenal bulb ulceration s/p epinephrine and bicap per Dr. Juaquin Mcneal. No active bleeding. A. Stomach, biopsy: Mild chronic gastritis, immunostain negative for Helicobacter  B. Esophagus, biopsy: Gastric glandular mucosa with prominent ntestinal metaplasia (Madrid's epithelium), negative for epithelial dysplasia, esophageal squamous mucosa not identified     Cycle # 16 FOLFOX (discontinued avastin (bevacizumab) given association of peptic ulcer disease and known association of GI perforation.) was on 06/21/2016. CEA 47 on 06/21/2016. Cycle # 17 FOLFOX was on 07/05/2016. CEA 52.6 on 07/05/2016. CEA 47.6 on 07/19/2016.     Re-staging scans 07/19/2106: CT Chest negative for metastatic disease. CT Abdomen/Pelvis: Stable hepatic lesions; Question new mural thickening in the cecum. Bone Scan: New Let hip lesion suspicious for bone metastasis.     Increased CEA; new mural thickening in the cecum and new left hip lesion suspicious for bone metastasis are consistent with disease progression; He derived maximum benefit from FOLFOX/AVASTIN. D/C FOLFOX/AVASTIN. We recommended FOLFIRI second line therapy. Cycle # 1 FOLFIRI was on 08/02/2016. CEA 40.8 on 08/02/2016. Xgeva q4 weeks started on 08/02/2016. Cycle # 2 FOLFIRI was on 08/16/2016. CEA 31.7 on 08/16/2016. Cycle # 3 FOLFIRI (added Avastin) was on 08/30/2016 given that ulcers healed on EGD 08/15/2016 by Dr. Binnie Fothergill; Protonix bid since avastin re-started. Colonoscopy on 08/29/2016 (to look at the cecal area) unremarkable. CEA 26.7 on 08/30/2016.   Cycle # 4 FOLFIRI/Avastin was on 09/13/2016. CEA 23.4 on 09/13/2016. Cycle # 5 FOLFIRI/Avastin was on 09/27/2016. CEA 22.3 on 09/27/2016. Cycle # 6 FOLFIRI/Avastin was on 10/11/2016. CEA 18.4 on 10/11/2016.      Bone scan 10/21/2016 stable bone metastasis; ? New lesion anterior left sixth rib likely interval healing fracture. CT abdomen/pelvis revealed stable hepatic metastasis. CT chest negative for metastatic disease. Continue FOLFIRI/Avastin and repeat scans in 3 months. Cycle # 7 FOLFIRI/Avastin was on 10/25/2016. CEA 16.1 on 10/25/2016. Cycle # 8 FOLFIRI/Avastin was on 11/08/2016. CEA 15.7 on 11/08/2016. Cycle # 9 FOLFIRI/Avastin was on 11/29/2016. CEA 13.6 on 11/29/2016. Cycle # 10 FOLFIRI/Avastin was on 12/13/2016. CEA 10.6 on 12/13/2016. Cycle # 11 FOLFIRI/Avastin was on 12/27/2016. CEA 11.1 on 12/27/2016. Cycle # 12 FOLFIRI/Avastin was on 01/10/2017. CEA 9.7 on 01/10/2017. CT chest 01/23/2017 No new pulmonary nodule or lymphadenopathy. Patchy groundglass opacities within the left lower lobe, suggestive of infectious or inflammatory etiology. Followup CT chest in 3 months. Slight increased linear sclerosis along the left anterior sixth rib corresponding to an area of increased uptake on the prior bone scan from 10/21/2016, favored to be related to interval healing changes of a nondisplaced fracture. CT abdomen/pelvis on 01/23/2017 Redemonstration of multiple hepatic metastases, which are similar compared to the prior CT from 10/21/2016. Redemonstration of area of increased sclerosis along the right acetabulum suspicious for metastatic disease. Bone scan on 01/23/2017 Stable subtle uptake within the left proximal diaphysis, again suggestive of metastatic disease  Decreased subtle uptake within the medial right acetabulum. Decreased uptake within the left anterior sixth rib corresponding to linear sclerosis on the recent CT, favored to be related to an joint rib fracture.     Continue FOLFIRI + Avastin and repeat scans in 3 months. Cycle # 13 FOLFIRI + Avastin was on 01/24/2017. Cycle # 14 FOLFIRI + Avastin was on 02/07/2017. Cycle # 15 FOLFIRI + Avastin was on 02/21/2017. Cycle # 16 FOLFIRI + Avastin was on 03/07/2017. Cycle # 17 FOLFIRI + Avastin was on 03/21/2017. Cycle # 18 FOLFIRI + Avastin was on 04/04/2017. CT chest 04/17/2017 negative for metastatic disease. CT abdomen/pelvis 04/17/2017 Stable hypodense metastatic lesions within the liver. Stable area of increased sclerosis along the right acetabulum  Bone scan 04/17/2017 Stable subtle uptake within the left proximal femoral diaphysis; No definite abnormal uptake in the region of a sclerotic lesion along the posterior column of the right acetabulum noted on CT. Stable slight uptake within the left anterior sixth rib corresponding to linear sclerosis on the recent CT, favored to be related to a fracture. Continue FOLFIRI + Avastin and repeat scans in 3 months. Cycle # 19 FOLFIRI + Avastin was on 04/18/2017. CEA 7.5 on 04/18/2017. Cycle # 20 FOLFIRI + Avastin was on 05/02/2017. CEA 7.7 on 05/02/2017. Cycle # 21 FOLFIRI + Avastin was on 05/16/2017. CEA 7.1 on 05/16/2017. Cycle # 22 FOLFIRI + Avastin was on 05/30/2017. CEA 8.2 on 05/30/2017. Cycle # 23 FOLFIRI + Avastin was on 06/13/2017. CEA 7.7 on 06/13/2017. Cycle # 24 FOLFIRI + Avastin was on 06/27/2017. CEA 6.6 on 06/27/2017. Re-staging scans 07/05/2017:  Bone scan stable proximal left femoral diaphysis, right acetabulum, and left anterior sixth rib lesions;  CT abdomen/pelvis stable hypodense metastatic lesions within the liver; CT chest without convincing evidence of metastatic disease. Cycle # 25 FOLFIRI + Avastin was on 07/11/2017. CEA 6.3 on 07/11/2017. Cycle # 26 FOLFIRI + Avastin was on 07/25/2017. CEA 7.3 on 07/25/2017. Cycle # 27 FOLFIRI + Avastin was on 08/08/2017. CEA 6.5 on 08/08/2017. Cycle # 28 FOLFIRI + Avastin was on 08/22/2017.   CEA 5.9 on 08/22/2017. Cycle # 29 FOLFIRI + Avastin was on 09/05/2017. CEA 6.3 on 09/05/2017. Cycle # 30 FOLFIRI + Avastin was on 09/19/2017. CEA 6.3 on 09/19/2017. Bone scan 09/26/2017 stable. CT abdomen/pelvis on 09/26/2017 Stable hypodense mass in the liver. Stable sclerotic lesion in the acetabulum. CT chest 09/26/2017 negative for metastatic disease. Cycle # 31 FOLFIRI + Avastin was on 10/03/2017. CEA 7.4 on 10/03/2017. Cycle # 32 FOLFIRI + Avastin was on 10/17/2017. CEA 6.0 on 10/17/2017. Cycle # 33 FOLFIRI + Avastin was on 10/31/2017. CEA 6.8 on 10/31/2017. Cycle # 34 FOLFIRI + Avastin was on 11/14/2017. CEA 7.2 on 11/14/2017. Cycle # 35 FOLFIRI + Avastin was on 11/28/2017. CEA 5.1 on 11/28/2017. Cycle # 36 FOLFIRI + Avastin was on 12/12/2017. CEA 6.1 on 12/12/2017. Bone scan 12/22/2017 noted no evidence of metastatic disease to the axial or appendicular skeleton. CT chest 12/22/2017 noted no evidence of metastatic disease. CT abdomen/pelvis 12/22/2017 noted stable hypodense lesions in the liver. Continue FOLFIRI + Avastin and repeat scans in 3 months. Cycle # 37 FOLFIRI + Avastin was on 12/27/2018. CEA 6.7 on 12/27/2017. Cycle # 38 FOLFIRI + Avastin was on 01/09/2018. CEA 5.0 on 01/09/2018. Cycle # 39 FOLFIRI + Avastin was on 01/23/2018. CEA 6.5 on 01/23/2018. Cycle # 40 FOLFIRI + Avastin was on 02/06/2018. CEA 6.9 on 02/06/2018. Cycle # 41 FOLFIRI + Avastin was on 02/20/2018. CEA 6.4 on 02/20/2018. Cycle # 42 FOLFIRI + Avastin was on 03/06/2018. CEA 6.6 on 03/06/2018. Cycle # 43 FOLFIRI + Avastin was on 03/20/2018. CEA 5.7 on 03/20/2018. Bone scan on 03/26/2018 negative for metastatic disease. CT chest 03/26/2018 noted ? new 7 mm nodule in LUCY. CT abdomen/pelvis 03/26/2018 noted stable hypodense lesions in the liver. ? New small ascites in the abdomen.   Patient wants to continue to monitor the lung finding and repeat scans in 2 months instead of changing the current chemotherapy regimen to oral Lonsurf. Cycle # 44 FOLFIRI + Avastin was on 04/03/2018. CEA 6.3 on 04/03/2018. Cycle # 45 FOLFIRI + Avastin was on 04/24/2018. CEA 5.3 on 04/24/2018. Cycle # 46 FOLFIRI + Avastin was on 05/08/2018. CEA 5.4 on 05/08/2018. Cycle # 47 FOLFIRI + Avastin was on 05/22/2018. CEA 6.1 on 05/22/2018. CT chest 05/31/2018 noted stable nodule in LUCY. No evidence of worsening malignancy. CT abdomen/pelvis on 05/31/2018 noted stable liver metastasis. No evidence of worsening malignancy. Continue FOLFIRI + Avastin and repeat scans in 3 months. Cycle # 48 FOLFIRI + Avastin was on 06/06/2018. CEA 6.3 on 06/05/2018. Cycle # 49 FOLFIRI + Avastin was on 06/19/2018. CEA 6.1 on 06/19/2018. Cycle # 50 FOLFIRI + Avastin was on 07/03/2018. CEA 7.4 on 07/03/2018. Cycle # 51 FOLFIRI + Avastin was on 07/24/2018. CEA 6.7 on 07/24/2018. Cycle # 52 FOLFIRI + Avastin was on 08/14/2018. CEA 6.8 on 08/14/2018. Cycle # 53 FOLFIRI + Avastin was on 08/28/2018. CEA 6.5 on 08/27/2018. CT chest 09/06/2018 noted interval decrease in size of LUCY measuring up to 4 mm, suggestive of treatment response. No mediastinal or hilar LN  CT abdomen/pelvis 09/06/2018 Slight interval increase in size of a previously noted metastatic lesion within the right hepatic lobe. This may be related to differences in phase of enhancement compared to the most recent study from 5/31/2018, the lesion remains decreased in size compared to the more previous studies. No abdominal, retroperitoneal, or pelvic lymphadenopathy. Continue FOLFIRI + Avastin and repeat scans in 2 months. Cycle # 54 FOLFIRI + Avastin was on 09/11/2018. CEA 6.4 on 09/10/2018. Cycle # 55 FOLFIRI + Avastin was on 09/25/2018. CEA 6.4 on 09/25/2018. Cycle # 56 FOLFIRI + Avastin was on 10/09/2018. CEA 6.7 on 10/08/2018. Cycle # 57 FOLFIRI + Avastin was on 10/23/2018. CEA 7.2 on 10/22/2018. CT chest 11/02/2018 stable 3 mm nodule within LUCY. No new right and left lung nodule.  No on 07/30/2019. CEA 5.0 on 07/29/2019. Cycle # 74 FOLFIRI + Avastin was on 08/13/2019. CEA 5.0 on 08/12/2019. CT chest 08/20/2019 negative  CT abdomen/pelvis 08/20/2019 Previous identified hepatic lesions are not visualized on the current exam.  Continue FOLFIRI + Avastin and repeat scans in 3 months. Cycle # 75 FOLFIRI + Avastin was on 08/27/2019. CEA 5.0 on 08/26/2019. Cycle # 76 FOLFIRI + Avastin was on 09/17/2019. CEA 5.5 on 09/16/2019. Cycle # 77 FOLFIRI + Avastin was on 10/15/2019. CEA 4.6 on 10/15/2019. Cycle # 78 FOLFIRI + Avastin was on 10/29/2019. CEA 4.1 on 10/28/2019. Cycle # 79 FOLFIRI + Avastin was on 11/12/2019. CEA 4.3 on 11/12/2019. Cycle # 80 FOLFIRI + Avastin was on 12/10/2019. CEA 4.0 on 12/09/2019. CT chest 12/19/2019 Stable 3 mm nodule within the left upper lobe, similar to the previous studies dating back to 11/2/2018, however decreased in size compared to the CT from 3/26/2018. No thoracic LN. CT abdomen/pelvis 12/19/2019 Previously described small hypodense lesions are more conspicuous on the current study compared to the recent study throughout the liver from 8/20/2019, however similar to the prior study from 2/21/2019. Findings may be related to differences in contrast opacification. No abdominal or pelvic lymphadenopathy. Continue FOLFIRI + Avastin and repeat scans in 2 months to f/u on liver lesions. Cycle # 81 FOLFIRI + Avastin was on 01/07/2020. CEA 3.8 on 01/06/2020. Cycle # 82 FOLFIRI + Avastin was on 01/21/2020. CEA 3.7 on 01/20/2020. Cycle # 83 FOLFIRI + Avastin was on 02/04/2020. CEA 4.1 on 02/03/2020. Cycle # 84 FOLFIRI + Avastin was on 02/18/2020. CEA 4.6 on 02/17/2020. CT chest 2/28/2020 no evidence of metastatic disease to the lungs and no mediastinal or hilar adenopathy. CT abdomen pelvis 2/28/2020 no enhancing lesions seen within the liver to suggest metastatic disease. Wall thickening of the rectosigmoid junction. Images reviewed.  Continue FOLFIRI Avastin and repeat scans in 3 months  EGD by Dr. Lewis Tong 03/02/2020 showing no masses or lesions. Cycle # 85 FOLFIRI + Avastin was on 03/03/2020. CEA 7.4 on 03/02/2020. Cycle # 86 FOLFIRI + Avastin was on 03/17/2020. CEA 4.5 on 03/16/2020. Colonoscopy on 03/23/2020 by Dr. Lewis Tong unremarkable (records requested). Cycle # 87 FOLFIRI + Avastin was on 03/31/2020. CEA 4.1 on 03/30/2020. Cycle # 88 FOLFIRI + Avastin was on 04/21/2020. CEA 6.4 on 04/20/2020. Cycle # 89 FOLFIRI + Avastin was on 05/05/2020. CEA 5.8 on 05/04/2020. Cycle # 90 FOLFIRI + Avastin was on 06/02/2020. CEA 5.5 on 06/01/2020. Cycle # 91 FOLFIRI + Avastin was on 06/16/2020. CEA 5.6 on 06/15/2020. CT chest 06/23/2020 noted no metastatic disease in the chest.   CT abdomen/pelvis 06/23/2020 Stable small liver lesions measuring up to 5 mm since 2019.   22 mm round mass in segment 8 of the liver with central high density stable from December 2019), previously measuring up to 34 mm in 2017. No additional metastatic disease in the abdomen or pelvis. Small amount of ascites. Overall stable disease. Continue FOLFIRI + Avastin and repeat scans in 2-3 months. Cycle # 92 FOLFIRI + Avastin was on 07/07/2020. CEA 5.7 on 07/06/2020. Cycle # 93 FOLFIRI + Avastin was on 07/21/2020. CEA 5.9 on 07/20/2020. Cycle # 94 FOLFIRI + Avastin was on 08/04/2020. CEA 5.5 on 08/04/2020. Cycle # 95 FOLFIRI + Avastin was on 08/25/2020. CEA 6.1 on 08/24/2020. CT chest 9/2/2020 stable unremarkable enhanced CT of the thorax. There is no metastatic disease to the lungs. CT abdomen pelvis on 9/2/2020 stable 2.2 cm low attenuated lesion within the central aspect of the right hepatic lobe favored to represent treated metastatic disease. No new hepatic lesion is identified. Stable splenomegaly  There is no appreciable colonic lesion or stricture. Pericholecystic fluid of uncertain etiology.      Laparoscopic cholecystectomy with normal cholangiogram 10/27/20  Gallbladder: Chronic cholecystitis and cholelithiasis.  Unremarkable regional lymph node. 11/13/2020; Seen by Dr. Eleonora Walsh from General surgery team; cleared to re-start chemotherapy. Cycle # 96 FOLFIRI + Avastin was on 11/17/2020. CEA 3.6 on 11/16/2020. Cycle # 97 FOLFIRI + Avastin was on 12/01/2020. CEA 3.5 on 11/30/2020. Cycle # 98 FOLFIRI + Avastin was on 12/15/2020. CEA 4.3 on 12/15/2020. Cycle # 99 FOLFIRI + Avastin was on 01/05/2021. CEA 4.7 on 01/04/2021. CT chest 01/13/2021 negative for metastatic disease. CT abdomen/pelvis 01/13/2021 Unchanged central hepatic lesion. No specific signs of abdominopelvic metastatic disease. Continue FOLFIRI + Avastin and repeat scans in 3 months. Cycle # 100 FOLFIRI + Avastin was on 01/19/2021. CEA 4.7 on 01/18/2021. Cycle # 101 FOLFIRI + Avastin was on 02/02/2021. CEA 5.2 on 02/01/2021. Cycle # 102 FOLFIRI + Avastin was on 02/16/2021. CEA 5.6 on 02/15/2021. Cycle # 103 FOLFIRI + Avastin was on 03/02/2021. Cycle # 104 FOLFIRI (20% dose reduced due to significant toxicity) + Avastin was on 03/16/2021. Cycle # 105 FOLFIRI (same dose as cycle # 104) + Avastin was on 03/30/2021. CEA 6.5 on 03/29/2021. Cycle # 106 FOLFIRI (same dose as cycle # 104) + Avastin was on 04/13/2021. CEA 6.0 on 04/12/2021  CT chest 04/20/2021 no evidence of metastatic disease. CT abdomen/pelvis 04/20/2021 No evidence of progressive metastatic disease. Stable 2 cm lesion in the right lobe of the liver, likely representing treated metastatic disease. Imaging reviewed. Continue FOLFIRI + Avastin and repeat scans in 3 months  Cycle # 107 FOLFIRI + Avastin was on 04/27/2021  Cycle # 108 FOLFIRI + Avastin was on 05/11/2021. CEA 6.4 on 05/10/2021. Cycle # 109 FOLFIRI (held Avastin due to upcoming surgery) on 05/25/2021. CEA 6.3 on 5/24/21  Cycle # 110 FOLFIRI (held Avastin due to upcoming surgery) on 06/08/2021. CEA 6.3 on 6/07/21. CEA 5.8 on 06/28/2021.      Right inguinal/scrotol hernia repair on 07/13/2021 with Dr Martha Vilchis with folely removal on 07/23/2021. He developed fever on 07/24/2021 and was brought to [address] [place], [place] [address] ER via EMS; Sepsis secondary to urinary tract infection; Completed Abx  CT chest 08/13/2021 no sign of recurrent or metastatic disease. CT abdomen/pelvis 08/13/2021 unchanged hepatic lesions. Splenomegaly and secondary signs of portal venous hypertension  CEA 4.0 on 08/16/2021  CEA 4.1 on 08/30/2021  Cycle # 111 FOLFIRI was on 08/31/2021. HBP team for evaluation of liver lesions; His lesion does appear to be a hemangioma as his prior metastatic deposits in his liver were hypo attenuating compared to this CT which is hyper attenuating. compared to his prior CT scans he has disappearing liver metastasis. MRI liver on 09/17/2021 No suspicious liver lesion identified. Colonoscopy GI Dr. Della Bains on 09/20/2021 unremarkable  CEA 3.3 on 09/27/2021. CEA 3.2 on 10/25/2021. RTC 4 weeks with prior scans and labs.    MSI testing noted no mismatch repair protein loss of expression    10/26/2021  Jarad Kinney MD  Board Certified Medical Oncologist

## 2021-11-08 ENCOUNTER — HOSPITAL ENCOUNTER (OUTPATIENT)
Dept: CT IMAGING | Age: 71
Discharge: HOME | End: 2021-11-08
Payer: MEDICARE

## 2021-11-08 ENCOUNTER — HOSPITAL ENCOUNTER (OUTPATIENT)
Age: 71
End: 2021-11-08
Payer: MEDICARE

## 2021-11-08 DIAGNOSIS — C20 RECTAL CANCER (HCC): ICD-10-CM

## 2021-11-08 PROCEDURE — 74177 CT ABD & PELVIS W/CONTRAST: CPT

## 2021-11-08 PROCEDURE — 71260 CT THORAX DX C+: CPT

## 2021-11-08 PROCEDURE — 6360000004 HC RX CONTRAST MEDICATION: Performed by: RADIOLOGY

## 2021-11-08 RX ADMIN — IOPAMIDOL 75 ML: 755 INJECTION, SOLUTION INTRAVENOUS at 14:48

## 2021-11-08 RX ADMIN — IOHEXOL 50 ML: 240 INJECTION, SOLUTION INTRATHECAL; INTRAVASCULAR; INTRAVENOUS; ORAL at 14:48

## 2021-11-22 ENCOUNTER — HOSPITAL ENCOUNTER (OUTPATIENT)
Age: 71
Discharge: HOME | End: 2021-11-22
Payer: MEDICARE

## 2021-11-22 DIAGNOSIS — C78.7 RECTAL CANCER METASTASIZED TO LIVER (HCC): ICD-10-CM

## 2021-11-22 DIAGNOSIS — C20 RECTAL CANCER METASTASIZED TO LIVER (HCC): ICD-10-CM

## 2021-11-22 LAB
ALBUMIN SERPL-MCNC: 3.8 G/DL (ref 3.5–5.2)
ALP BLD-CCNC: 138 U/L (ref 40–129)
ALT SERPL-CCNC: 12 U/L (ref 0–40)
ANION GAP SERPL CALCULATED.3IONS-SCNC: 12 MMOL/L (ref 7–16)
AST SERPL-CCNC: 23 U/L (ref 0–39)
BASOPHILS ABSOLUTE: 0.01 E9/L (ref 0–0.2)
BASOPHILS RELATIVE PERCENT: 0.2 % (ref 0–2)
BILIRUB SERPL-MCNC: 0.7 MG/DL (ref 0–1.2)
BUN BLDV-MCNC: 35 MG/DL (ref 6–23)
CALCIUM SERPL-MCNC: 9.1 MG/DL (ref 8.6–10.2)
CEA: 2.7 NG/ML (ref 0–5.2)
CHLORIDE BLD-SCNC: 102 MMOL/L (ref 98–107)
CO2: 24 MMOL/L (ref 22–29)
CREAT SERPL-MCNC: 1.4 MG/DL (ref 0.7–1.2)
EOSINOPHILS ABSOLUTE: 0.1 E9/L (ref 0.05–0.5)
EOSINOPHILS RELATIVE PERCENT: 2.3 % (ref 0–6)
GFR AFRICAN AMERICAN: >60
GFR NON-AFRICAN AMERICAN: 50 ML/MIN/1.73
GLUCOSE BLD-MCNC: 147 MG/DL (ref 74–99)
HCT VFR BLD CALC: 29.8 % (ref 37–54)
HEMOGLOBIN: 10 G/DL (ref 12.5–16.5)
IMMATURE GRANULOCYTES #: 0.02 E9/L
IMMATURE GRANULOCYTES %: 0.5 % (ref 0–5)
LYMPHOCYTES ABSOLUTE: 0.42 E9/L (ref 1.5–4)
LYMPHOCYTES RELATIVE PERCENT: 9.8 % (ref 20–42)
MCH RBC QN AUTO: 31.5 PG (ref 26–35)
MCHC RBC AUTO-ENTMCNC: 33.6 % (ref 32–34.5)
MCV RBC AUTO: 94 FL (ref 80–99.9)
MONOCYTES ABSOLUTE: 0.36 E9/L (ref 0.1–0.95)
MONOCYTES RELATIVE PERCENT: 8.4 % (ref 2–12)
NEUTROPHILS ABSOLUTE: 3.38 E9/L (ref 1.8–7.3)
NEUTROPHILS RELATIVE PERCENT: 78.8 % (ref 43–80)
PDW BLD-RTO: 13.4 FL (ref 11.5–15)
PLATELET # BLD: 104 E9/L (ref 130–450)
PMV BLD AUTO: 10.2 FL (ref 7–12)
POTASSIUM SERPL-SCNC: 3.9 MMOL/L (ref 3.5–5)
RBC # BLD: 3.17 E12/L (ref 3.8–5.8)
SODIUM BLD-SCNC: 138 MMOL/L (ref 132–146)
TOTAL PROTEIN: 7.2 G/DL (ref 6.4–8.3)
WBC # BLD: 4.3 E9/L (ref 4.5–11.5)

## 2021-11-22 PROCEDURE — 80053 COMPREHEN METABOLIC PANEL: CPT

## 2021-11-22 PROCEDURE — 36415 COLL VENOUS BLD VENIPUNCTURE: CPT

## 2021-11-22 PROCEDURE — 85025 COMPLETE CBC W/AUTO DIFF WBC: CPT

## 2021-11-22 PROCEDURE — 82378 CARCINOEMBRYONIC ANTIGEN: CPT

## 2021-11-23 ENCOUNTER — HOSPITAL ENCOUNTER (OUTPATIENT)
Dept: INFUSION THERAPY | Age: 71
Discharge: HOME | End: 2021-11-23
Payer: MEDICARE

## 2021-11-23 ENCOUNTER — OFFICE VISIT (OUTPATIENT)
Dept: ONCOLOGY | Age: 71
End: 2021-11-23
Payer: MEDICARE

## 2021-11-23 VITALS
SYSTOLIC BLOOD PRESSURE: 141 MMHG | HEIGHT: 73 IN | DIASTOLIC BLOOD PRESSURE: 69 MMHG | OXYGEN SATURATION: 98 % | HEART RATE: 77 BPM | WEIGHT: 201.4 LBS | TEMPERATURE: 97.7 F | BODY MASS INDEX: 26.69 KG/M2

## 2021-11-23 DIAGNOSIS — C20 RECTAL CANCER METASTASIZED TO LIVER (HCC): Primary | ICD-10-CM

## 2021-11-23 DIAGNOSIS — C78.7 RECTAL CANCER METASTASIZED TO LIVER (HCC): Primary | ICD-10-CM

## 2021-11-23 DIAGNOSIS — C20 RECTAL CANCER (HCC): Primary | ICD-10-CM

## 2021-11-23 PROCEDURE — 6360000002 HC RX W HCPCS: Performed by: INTERNAL MEDICINE

## 2021-11-23 PROCEDURE — 2580000003 HC RX 258: Performed by: INTERNAL MEDICINE

## 2021-11-23 PROCEDURE — 99214 OFFICE O/P EST MOD 30 MIN: CPT | Performed by: INTERNAL MEDICINE

## 2021-11-23 PROCEDURE — 99214 OFFICE O/P EST MOD 30 MIN: CPT

## 2021-11-23 PROCEDURE — 96523 IRRIG DRUG DELIVERY DEVICE: CPT

## 2021-11-23 RX ORDER — SODIUM CHLORIDE 0.9 % (FLUSH) 0.9 %
10 SYRINGE (ML) INJECTION PRN
Status: CANCELLED | OUTPATIENT
Start: 2021-11-23

## 2021-11-23 RX ORDER — HEPARIN SODIUM (PORCINE) LOCK FLUSH IV SOLN 100 UNIT/ML 100 UNIT/ML
500 SOLUTION INTRAVENOUS PRN
Status: CANCELLED | OUTPATIENT
Start: 2021-11-23

## 2021-11-23 RX ORDER — SODIUM CHLORIDE 0.9 % (FLUSH) 0.9 %
10 SYRINGE (ML) INJECTION PRN
Status: DISCONTINUED | OUTPATIENT
Start: 2021-11-23 | End: 2021-11-24 | Stop reason: HOSPADM

## 2021-11-23 RX ORDER — HEPARIN SODIUM (PORCINE) LOCK FLUSH IV SOLN 100 UNIT/ML 100 UNIT/ML
500 SOLUTION INTRAVENOUS PRN
Status: DISCONTINUED | OUTPATIENT
Start: 2021-11-23 | End: 2021-11-24 | Stop reason: HOSPADM

## 2021-11-23 RX ADMIN — Medication 500 UNITS: at 09:33

## 2021-11-23 RX ADMIN — SODIUM CHLORIDE, PRESERVATIVE FREE 10 ML: 5 INJECTION INTRAVENOUS at 09:33

## 2021-11-23 NOTE — PROGRESS NOTES
Mutation: Mutation not detected, wild type.     CEA 3488. Bone scan on 11/10/2015 noted no metastatic disease. CT chest on 11/10/2015 revealed Multiple pulmonary nodules in the upper and lower lobes consistent with metastatic disease;   Hepatic metastasis also visualized. 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 Mediport was placed. Cycle # 1 of FOLFOX + Avastin was on 11/23/2015. CEA was 4555 on 11/23/2015. Cycle # 2 of FOLFOX + Avastin was on 12/08/2015. CEA was 3160 on 12/08/2015. Cycle # 3 of FOLFOX + Avastin was on 12/22/2015. CEA was 2516 on 12/21/2015. Cycle # 4 of FOLFOX + Avastin was on 01/05/2016. CEA was 2098 on 01/05/2015. Cycle # 5 of FOLFOX + Avastin was on 01/19/2016. CEA was 1511 on 01/05/2015.     -Bone scan on 01/26/2016 noted no metastatic disease. CT chest on 01/26/2016 revealed Significant response to treatment with no visible residual nodules. CT scan abdomen/pelvis on 01/26/2016 noted Interval decreased size of multiple masses in the liver compatible with treatment response. Continue another 2 months of FOLFOX + Avastin and repeat scans. Cycle # 6 of FOLFOX + Avastin was on 02/02/2016.  on 02/02/2016. Cycle # 7 of FOLFOX + Avastin was on 02/16/2016.  on 02/16/2016. Cycle # 8 of FOLFOX + Avastin was on 03/01/2016.  on 03/01/2016. Cycle # 9 of FOLFOX + Avastin was on 03/15/2016.  on 03/15/2016. Cycle # 10 of FOLFOX + Avastin was on 03/29/2016. .4 on 03/29/2016. Cycle # 11 of FOLFOX + Avastin was on 04/12/2016. .4 on 04/12/2016.     Re-staging scans on 04/19/2016: CT Chest: clear lungs; no evidence of recurring pulmonary nodule; CT Abdomen/Pelvis: Further interval decrease in size of the multiple metastatic hepatic lesions, the largest lesion now measures 3.9 x 3.5 cm and previously measured 4.5 cm in maximum diameter.  No mesenteric lymphadenopathy is identified; Bone Scan: No evidence of osseous metastasis. Continue same regimen and re-stage in 2-3 months. Cycle # 12 FOLFOX + Avastin was on 04/26/2016. .5 on 04/26/2016. Cycle # 13 FOLFOX + Avastin was on 05/10/2016. .3 on 05/10/2016. Cycle # 14 FOLFOX+AVASTIN was on 05/24/2016. .5 on 05/24/2016. Cycle # 15 FOLFOX + Avastin was on 06/07/2016. CEA 90.5 on 06/07/2016. Admitted to [facility] 06/13/2016-06/16/2016 for abdominal pain: EGD noted 1.5 cm clean based duodenal bulb ulceration s/p epinephrine and bicap per Dr. Jeannette Pereira. No active bleeding. A. Stomach, biopsy: Mild chronic gastritis, immunostain negative for Helicobacter  B. Esophagus, biopsy: Gastric glandular mucosa with prominent intestinal metaplasia (Mad[place]'s epithelium), negative for epithelial dysplasia, esophageal squamous mucosa not identified  Cycle # 16 FOLFOX (discontinued avastin (bevacizumab) given association of peptic ulcer disease and known association of GI perforation) was on 06/21/2016. Cycle # 17 FOLFOX was on 07/05/2016. CEA 52.6 on 07/19/2016. Cycle # 17 FOLFOX was on 07/05/2016. CEA 52.6 on 07/05/2016. CEA 47.6 on 07/19/2016.     Re-staging scans 07/19/2106: CT Chest negative for metastatic disease. CT Abdomen/Pelvis: Stable hepatic lesions; Question new mural thickening in the cecum. Bone Scan: New Let hip lesion suspicious for bone metastasis.     Increased CEA; new mural thickening in the cecum and new left hip lesion suspicious for bone metastasis are consistent with disease progression; He derived maximum benefit from FOLFOX/AVASTIN. D/C FOLFOX/AVASTIN. We recommended FOLFIRI second line therapy. Cycle # 1 FOLFIRI was on 08/02/2016. CEA 40.8 on 08/02/2016. Xgeva q4 weeks started on 08/02/2016. Cycle # 2 FOLFIRI was on 08/16/2016. CEA 31.7 on 08/16/2016. Cycle # 3 FOLFIRI (added Avastin) was on 08/30/2016 given that ulcers healed on EGD 08/15/2016 by Dr. Giuseppe Alatorre; Protonix bid since avastin re-started.    Colonoscopy on 08/29/2016 (to look at the cecal area) unremarkable. CEA 26.7 on 08/30/2016. Cycle # 4 FOLFIRI/Avastin was on 09/13/2016. CEA 23.4 on 09/13/2016. Cycle # 5 FOLFIRI/Avastin was on 09/27/2016. CEA 22.3 on 09/27/2016. Cycle # 6 FOLFIRI/Avastin was on 10/11/2016. CEA 18.4 on 10/11/2016.      Bone scan 10/21/2016 stable bone metastasis; ? New lesion anterior left sixth rib likely interval healing fracture. CT abdomen/pelvis revealed stable hepatic metastasis. CT chest negative for metastatic disease. Continue FOLFIRI/Avastin and repeat scans in 3 months. Cycle # 7 FOLFIRI/Avastin was on 10/25/2016. CEA 16.1  Cycle # 8 FOLFIRI/Avastin was on 11/08/2016. CEA 15.7  Cycle # 9 FOLFIRI/Avastin was on 11/29/2016. CEA 13.6 on 11/29/2016. Cycle # 10 FOLFIRI/Avastin was on 12/13/2016. CEA 10.6 on 12/13/2016. Cycle # 11 FOLFIRI/Avastin was on 12/27/2016. CEA 11.1 on 12/27/2016. Cycle # 12 FOLFIRI/Avastin was on 01/10/2017. CEA 9.7 on 01/10/2017.       CT chest 01/23/2017 No new pulmonary nodule or lymphadenopathy. Patchy groundglass opacities within the left lower lobe, suggestive of infectious or inflammatory etiology. Followup CT chest in 3 months. Slight increased linear sclerosis along the left anterior sixth rib corresponding to an area of increased uptake on the prior bone scan from 10/21/2016, favored to be related to interval healing changes of a nondisplaced fracture. CT abdomen/pelvis on 01/23/2017 Redemonstration of multiple hepatic metastases, which are similar compared to the prior CT from 10/21/2016. Redemonstration of area of increased sclerosis along the right acetabulum suspicious for metastatic disease. Bone scan on 01/23/2017 Stable subtle uptake within the left proximal diaphysis, again suggestive of metastatic disease  Decreased subtle uptake within the medial right acetabulum.    Decreased uptake within the left anterior sixth rib corresponding to linear sclerosis on the recent CT, favored to be related to an joint rib fracture. Continue FOLFIRI + Avastin and repeat scans in 3 months. Cycle # 13 FOLFIRI + Avastin was on 01/24/2017. Cycle # 14 FOLFIRI + Avastin was on 02/07/2017. Cycle # 15 FOLFIRI + Avastin was on 02/21/2017. Cycle # 16 FOLFIRI + Avastin was on 03/07/2017. Cycle # 17 FOLFIRI + Avastin was on 03/21/2017. Cycle # 18 FOLFIRI + Avastin was on 04/04/2017. CT chest 04/17/2017 negative for metastatic disease. CT abdomen/pelvis 04/17/2017 Stable hypodense metastatic lesions within the liver. Stable area of increased sclerosis along the right acetabulum  Bone scan 04/17/2017 Stable subtle uptake within the left proximal femoral diaphysis; No definite abnormal uptake in the region of a sclerotic lesion along the posterior column of the right acetabulum noted on CT. Stable slight uptake within the left anterior sixth rib corresponding to linear sclerosis on the recent CT, favored to be related to a fracture. Continue FOLFIRI + Avastin and repeat scans in 3 months. Cycle # 19 FOLFIRI + Avastin was on 04/18/2017. Cycle # 20 FOLFIRI + Avastin was on 05/02/2017. Cycle # 21 FOLFIRI + Avastin was on 05/16/2017. Cycle # 22 FOLFIRI + Avastin was on 05/30/2017. Cycle # 23 FOLFIRI + Avastin was on 06/13/2017. Cycle # 24 FOLFIRI + Avastin was on 06/27/2017. Cycle # 25 FOLFIRI + Avastin was on 07/11/2017. Cycle # 26 FOLFIRI + Avastin was on 07/25/2017. Cycle # 27 FOLFIRI + Avastin was on 08/08/2017. Cycle # 28 FOLFIRI + Avastin was on 08/22/2017. Cycle # 29 FOLFIRI + Avastin was on 09/05/2017. Cycle # 30 FOLFIRI + Avastin was on 09/19/2017. Bone scan 09/26/2017 stable. CT abdomen/pelvis on 09/26/2017 Stable hypodense mass in the liver. Stable sclerotic lesion in the acetabulum. CT chest 09/26/2017 negative for metastatic disease. Cycle # 31 FOLFIRI + Avastin was on 10/03/2017. CEA 7.4 on 10/03/2017. Cycle # 32 FOLFIRI + Avastin was on 10/17/2017. CEA 6.0 on 10/17/2017.   Cycle # 33 FOLFIRI + Avastin was on 10/31/2017. CEA 6.8 on 10/31/2017. Cycle # 34 FOLFIRI + Avastin was on 11/14/2017. CEA 7.2 on 11/14/2017. Cycle # 35 FOLFIRI + Avastin was on 11/28/2017. CEA 5.1 on 11/28/2017. Cycle # 36 FOLFIRI + Avastin was on 12/12/2017. CEA 6.1 on 12/12/2017. Bone scan 12/22/2017 noted no evidence of metastatic disease to the axial or appendicular skeleton. CT chest 12/22/2017 noted no evidence of metastatic disease. CT abdomen/pelvis 12/22/2017 noted stable hypodense lesions in the liver. Continue FOLFIRI + Avastin and repeat scans in 3 months. Cycle # 37 FOLFIRI + Avastin was on 12/27/2018. Cycle # 38 FOLFIRI + Avastin was on 01/09/2018. Cycle # 39 FOLFIRI + Avastin was on 01/23/2018. Cycle # 40 FOLFIRI + Avastin was on 02/06/2018. Cycle # 41 FOLFIRI + Avastin was on 02/20/2018. Cycle # 42 FOLFIRI + Avastin was on 03/06/2018. Cycle # 43 FOLFIRI + Avastin was on 03/20/2018. Bone scan on 03/26/2018 negative for metastatic disease. CT chest 03/26/2018 noted ? new 7 mm nodule in LUCY. CT abdomen/pelvis 03/26/2018 noted stable hypodense lesions in the liver. ? New small ascites in the abdomen. Patient wants to continue to monitor the lung finding and repeat scans in 2 months instead of changing the current regimen into oral Lonsurf. Cycle # 44 FOLFIRI + Avastin was on 04/03/2018. CEA 6.3 on 04/03/2018. Cycle # 45 FOLFIRI + Avastin was on 04/24/2018. CEA 5.3 on 04/24/2018. Cycle # 46 FOLFIRI + Avastin was on 05/08/2018. CEA 5.4 on 05/08/2018. Cycle # 47 FOLFIRI + Avastin was on 05/22/2018. CEA 6.1 on 05/22/2018. CT chest 05/31/2018 noted stable nodule in LUCY. No evidence of worsening malignancy. CT abdomen/pelvis on 05/31/2018 noted stable liver metastasis. No evidence of worsening malignancy. Continue FOLFIRI + Avastin and repeat scans in 3 months. Cycle # 48 FOLFIRI + Avastin was on 06/06/2018. Cycle # 49 FOLFIRI + Avastin was on 06/19/2018.    Cycle # 50 FOLFIRI + Avastin was on 07/03/2018. Cycle # 51 FOLFIRI + Avastin was on 07/24/2018. Cycle # 52 FOLFIRI + Avastin was on 08/14/2018. Cycle # 53 FOLFIRI + Avastin was on 08/28/2018. CT chest 09/06/2018 noted interval decrease in size of LUCY measuring up to 4 mm, suggestive of treatment response. No mediastinal or hilar LN  CT abdomen/pelvis 09/06/2018 Slight interval increase in size of a previously noted metastatic lesion within the right hepatic lobe. This may be related to differences in phase of enhancement compared to the most recent study from 5/31/2018, the lesion remains decreased in size compared to the more previous studies. No abdominal, retroperitoneal, or pelvic lymphadenopathy. Continue FOLFIRI + Avastin and repeat scans in 2 months. Cycle # 54 FOLFIRI + Avastin was on 09/11/2018. Cycle # 55 FOLFIRI + Avastin was on 09/25/2018. Cycle # 56 FOLFIRI + Avastin was on 10/09/2018. Cycle # 57 FOLFIRI + Avastin was on 10/23/2018. CT chest 11/02/2018 stable 3 mm nodule within LUCY. No new right and left lung nodule. No mediastinal or osseous lesion. CT abdomen/pelvis 11/02/2018 stable metastatic disease to liver. No new metastatic disease identified. No mesenteric or retroperitoneal LN. No gross colonic lesion identified. Continue FOLFIRI + Avastin and repeat scans in 3 months. Cycle # 58 FOLFIRI + Avastin was on 11/06/2018. CEA 7.6 on 11/05/2018. Cycle # 59 FOLFIRI + Avastin was on 11/27/2018. CEA 6.9 on 11/26/2018. Cycle # 60 FOLFIRI + Avastin was on 12/11/2018. CEA 6.7 on 12/11/2018. Cycle # 61 FOLFIRI + Avastin was on 01/08/2019. CEA 5.6 on 01/07/2019. Cycle # 62 FOLFIRI + Avastin was on 01/29/2019. CEA 4.6 on 01/28/2019. Cycle # 63 FOLFIRI + Avastin was on 02/12/2019. CEA 4.3 on 02/11/2019. CT chest 02/21/2019 no evidence of metastatic disease. CT abdomen/pelvis 02/21/2019 stable hypodense liver lesions. No evidence of worsening metastatic disease.  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 Ordered MRI thoracic spine and referred to neurosurgery team.    Cycle # 64 FOLFIRI + Avastin was on 02/26/2019. CEA 4.7 on 02/25/2019. Cycle # 65 FOLFIRI + Avastin was on 03/12/2019. CEA 4.0 on 03/11/2019. Cycle # 66 FOLFIRI + Avastin was on 03/26/2019. CEA 4.7 on 03/25/2019. Cycle # 67 FOLFIRI + Avastin was on 04/09/2019. CEA 5.6 on 04/08/2019. Cycle # 68 FOLFIRI + Avastin was on 04/30/2019. CEA 4.9 on 04/29/2019. Cycle # 69 FOLFIRI + Avastin was on 05/14/2019. CEA 5.3 on 05/14/2019. CT chest 05/23/2019 stable small lung nodule with no evidence of metastatic disease. CT abdomen/pelvis 05/23/2019 Decrease conspicuity of the liver lesions. No new lesions seen. Stable splenomegaly. Continue FOLFIRI + Avastin and repeat scans in 3 months. Cycle # 70 FOLFIRI + Avastin was on 06/04/2019. CEA 4.8 on 06/03/2019. Cycle # 71 FOLFIRI + Avastin was on 06/18/2019. CEA 4.6 on 06/17/2019. Cycle # 72 FOLFIRI + Avastin was on 07/09/2019. CEA 4.3 on 07/08/2019. Cycle # 73 FOLFIRI + Avastin was on 07/30/2019. CEA 5.0 on 07/29/2019. Cycle # 74 FOLFIRI + Avastin was on 08/13/2019. CEA 5.0 on 08/12/2019. CT chest 08/20/2019 negative  CT abdomen/pelvis 08/20/2019 Previous identified hepatic lesions are not visualized on the current exam.  Continue FOLFIRI + Avastin and repeat scans in 3 months. Cycle # 75 FOLFIRI + Avastin was on 08/27/2019. CEA 5.0 on 08/26/2019. Cycle # 76 FOLFIRI + Avastin was on 09/17/2019. CEA 5.5 on 09/16/2019. Cycle # 77 FOLFIRI + Avastin was on 10/15/2019. CEA 4.6 on 10/15/2019. Cycle # 78 FOLFIRI + Avastin was on 10/29/2019. CEA 4.1 on 10/28/2019. Cycle # 79 FOLFIRI + Avastin was on 11/12/2019. CEA 4.3 on 11/12/2019. Cycle # 80 FOLFIRI + Avastin was on 12/10/2019. CEA 4.0 on 12/09/2019.   CT chest 12/19/2019 Stable 3 mm nodule within the left upper lobe, similar to the previous studies dating back to 11/2/2018, however decreased in size Cycle # 93 FOLFIRI + Avastin was on 07/21/2020. CEA 5.9 on 07/20/2020. Cycle # 94 FOLFIRI + Avastin was on 08/04/2020. CEA 5.5 on 08/04/2020. Cycle # 95 FOLFIRI + Avastin was on 08/25/2020. CEA 6.1 on 08/24/2020. CT chest 9/2/2020 stable unremarkable enhanced CT of the thorax. There is no metastatic disease to the lungs. CT abdomen pelvis on 9/2/2020 stable 2.2 cm low attenuated lesion within the central aspect of the right hepatic lobe favored to represent treated metastatic disease. No new hepatic lesion is identified. Stable splenomegaly  There is no appreciable colonic lesion or stricture  Pericholecystic fluid of uncertain etiology. General surgery team consulted. Laparoscopic cholecystectomy with normal cholangiogram 10/27/20  Gallbladder: Chronic cholecystitis and cholelithiasis.  Unremarkable regional lymph node. 11/13/2020; Seen by Dr. Rehan Ramos from General surgery team; cleared to re-start chemotherapy. Cycle # 96 FOLFIRI + Avastin was on 11/17/2020. CEA 3.6 on 11/16/2020. Cycle # 97 FOLFIRI + Avastin was on 12/01/2020. CEA 3.5 on 11/30/2020. Cycle # 98 FOLFIRI + Avastin was on 12/15/2020. CEA 4.3 on 12/15/2020. Cycle # 99 FOLFIRI + Avastin was on 01/05/2021. CEA 4.7 on 01/04/2021. CT chest 01/13/2021 negative for metastatic disease. CT abdomen/pelvis 01/13/2021 Unchanged central hepatic lesion. No specific signs of abdominopelvic metastatic disease. Continue FOLFIRI + Avastin and repeat scans in 3 months. Cycle # 100 FOLFIRI + Avastin was on 01/19/2021. CEA 4.7 on 01/18/2021. Cycle # 101 FOLFIRI + Avastin was on 02/02/2021. CEA 5.2 on 02/01/2021. Cycle # 102 FOLFIRI + Avastin was on 02/16/2021. CEA 5.6 on 02/15/2021. Cycle # 103 FOLFIRI + Avastin was on 03/02/2021. Cycle # 104 FOLFIRI (20% dose reduced due to significant toxicity) + Avastin was on 03/16/2021. Cycle # 105 FOLFIRI (same dose as cycle # 104) + Avastin was on 03/30/2021. CEA 6.5 on 03/29/2021.   Cycle # 106 headache. Remainder ROS: Negative. Past Medical History:   Past Medical History               Diagnosis Date    Arthritis      Cancer ([facility] [address]         colon    Depression      Hyperlipidemia      Hypertension           Medications:  Reviewed and reconciled.     Allergies: Allergies   Allergen Reactions    Neosporin [Neomycin-Polymyxin-Gramicidin] Rash    Tape Otoniel Standing Tape] Rash      Physical Exam:  BP (!) 141/69   Pulse 77   Temp 97.7 °F (36.5 °C)   Ht 6' 1\" (1.854 m)   Wt 201 lb 6.4 oz (91.4 kg)   SpO2 98%   BMI 26.57 kg/m²   GENERAL: Alert, oriented x 3, not in distress  HEENT: PERRLA, EOMI. NECK: Supple. Without lymphadenopathy. LUNGS: CTA bilaterally, with no wheezing, crackles or rhonchi. CARDIOVASCULAR: Regular rhythm. No murmurs, rubs or gallops. ABDOMEN: Soft. Non-tender, non-distended. EXTREMITIES: Without clubbing, cyanosis. RLE cast  NEUROLOGIC: No focal deficits. ECOG PS 1    Diagnostics:  Lab Results   Component Value Date    WBC 4.3 (L) 11/22/2021    HGB 10.0 (L) 11/22/2021    HCT 29.8 (L) 11/22/2021    MCV 94.0 11/22/2021     (L) 11/22/2021     Lab Results   Component Value Date     11/22/2021    K 3.9 11/22/2021     11/22/2021    CO2 24 11/22/2021    BUN 35 (H) 11/22/2021    CREATININE 1.4 (H) 11/22/2021    GLUCOSE 147 (H) 11/22/2021    CALCIUM 9.1 11/22/2021    PROT 7.2 11/22/2021    LABALBU 3.8 11/22/2021    BILITOT 0.7 11/22/2021    ALKPHOS 138 (H) 11/22/2021    AST 23 11/22/2021    ALT 12 11/22/2021    LABGLOM 50 11/22/2021    GFRAA >60 11/22/2021     Lab Results   Component Value Date    CEA 2.7 11/22/2021     Impression/Plan:  65 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 KRAS Mutation: Mutation detected. BRAF Mutation: Mutation not detected. 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   CT scan abdomen/pelvis on 10/26/2015 revealed small nodules at the lung bases, extensive liver lesions consistent with metastatic rectosigmoid cancer. CEA 3488 on 10/30/2015. Bone scan on 11/10/2015 noted no metastatic disease. CT chest on 11/10/2015 revealed Multiple pulmonary nodules in the upper and lower lobes consistent with metastatic disease; Hepatic metastasis also visualized. 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 Mediport was placed. Cycle # 1 of FOLFOX + Avastin was on 11/23/2015. CEA was 4555 on 11/23/2015. Cycle # 2 of FOLFOX + Avastin was on 12/08/2015. CEA was 3160 on 12/08/2015. Cycle # 3 of FOLFOX + Avastin was on 12/22/2015. CEA was 2516 on 12/21/2015. Cycle # 4 of FOLFOX + Avastin was on 01/05/2016. CEA was 2098 on 01/05/2015. Cycle # 5 of FOLFOX + Avastin was on 01/19/2016. CEA was 1511 on 01/05/2015.     -Bone scan on 01/26/2016 noted no metastatic disease. CT chest on 01/26/2016 revealed Significant response to treatment with no visible residual nodules. CT scan abdomen/pelvis on 01/26/2016 noted Interval decreased size of multiple masses in the liver compatible with treatment response. Continue another 2 months of FOLFOX + Avastin and repeat scans. Cycle # 6 of FOLFOX + Avastin was on 02/02/2016.  on 02/02/2016. Cycle # 7 of FOLFOX + Avastin was on 02/16/2016.  on 02/16/2016. Cycle # 8 of FOLFOX + Avastin was on 03/01/2016.  on 03/01/2016. Cycle # 9 of FOLFOX + Avastin was on 03/15/2016.  on 03/15/2016. Cycle # 10 of FOLFOX + Avastin was on 03/29/2016. .4 on 03/29/2016. Cycle # 11 of FOLFOX + Avastin was on 04/12/2016. .4 on 04/12/2016.     Re-staging scans on 04/19/2016: CT Chest: clear lungs; no evidence of recurring pulmonary nodule; CT Abdomen/Pelvis: Further interval decrease in size of the multiple metastatic hepatic lesions, the largest lesion now measures 3.9 x 3.5 cm and previously measured 4.5 cm in maximum diameter.  No mesenteric lymphadenopathy is identified; Bone Scan: No evidence of osseous metastasis. Continue same regimen and re-stage in 2-3 months. Cycle # 12 FOLFOX + Avastin was on 04/26/2016. .5 on 04/26/2016. Cycle # 13 FOLFOX + Avastin was on 05/10/2016. .3 on 05/10/2016. Cycle # 14 FOLFOX+AVASTIN was on 05/24/2016. .5 on 05/24/2016. Cycle # 15 FOLFOX + Avastin was on 06/07/2016. CEA 90.5 on 06/07/2016. Admitted to [facility] 06/13/2016-06/16/2016 for abdominal pain: EGD noted 1.5 cm clean based duodenal bulb ulceration s/p epinephrine and bicap per Dr. Jeannette Pereira. No active bleeding. A. Stomach, biopsy: Mild chronic gastritis, immunostain negative for Helicobacter  B. Esophagus, biopsy: Gastric glandular mucosa with prominent ntestinal metaplasia (Mad[place]'s epithelium), negative for epithelial dysplasia, esophageal squamous mucosa not identified     Cycle # 16 FOLFOX (discontinued avastin (bevacizumab) given association of peptic ulcer disease and known association of GI perforation.) was on 06/21/2016. CEA 47 on 06/21/2016. Cycle # 17 FOLFOX was on 07/05/2016. CEA 52.6 on 07/05/2016. CEA 47.6 on 07/19/2016.     Re-staging scans 07/19/2106: CT Chest negative for metastatic disease. CT Abdomen/Pelvis: Stable hepatic lesions; Question new mural thickening in the cecum. Bone Scan: New Let hip lesion suspicious for bone metastasis.     Increased CEA; new mural thickening in the cecum and new left hip lesion suspicious for bone metastasis are consistent with disease progression; He derived maximum benefit from FOLFOX/AVASTIN. D/C FOLFOX/AVASTIN. We recommended FOLFIRI second line therapy. Cycle # 1 FOLFIRI was on 08/02/2016. CEA 40.8 on 08/02/2016. Xgeva q4 weeks started on 08/02/2016. Cycle # 2 FOLFIRI was on 08/16/2016. CEA 31.7 on 08/16/2016. Cycle # 3 FOLFIRI (added Avastin) was on 08/30/2016 given that ulcers healed on EGD 08/15/2016 by Dr. Giuseppe Alatorre; Protonix bid since avastin re-started. Colonoscopy on 08/29/2016 (to look at the cecal area) unremarkable. CEA 26.7 on 08/30/2016. Cycle # 4 FOLFIRI/Avastin was on 09/13/2016. CEA 23.4 on 09/13/2016. Cycle # 5 FOLFIRI/Avastin was on 09/27/2016. CEA 22.3 on 09/27/2016. Cycle # 6 FOLFIRI/Avastin was on 10/11/2016. CEA 18.4 on 10/11/2016.      Bone scan 10/21/2016 stable bone metastasis; ? New lesion anterior left sixth rib likely interval healing fracture. CT abdomen/pelvis revealed stable hepatic metastasis. CT chest negative for metastatic disease. Continue FOLFIRI/Avastin and repeat scans in 3 months. Cycle # 7 FOLFIRI/Avastin was on 10/25/2016. CEA 16.1 on 10/25/2016. Cycle # 8 FOLFIRI/Avastin was on 11/08/2016. CEA 15.7 on 11/08/2016. Cycle # 9 FOLFIRI/Avastin was on 11/29/2016. CEA 13.6 on 11/29/2016. Cycle # 10 FOLFIRI/Avastin was on 12/13/2016. CEA 10.6 on 12/13/2016. Cycle # 11 FOLFIRI/Avastin was on 12/27/2016. CEA 11.1 on 12/27/2016. Cycle # 12 FOLFIRI/Avastin was on 01/10/2017. CEA 9.7 on 01/10/2017. CT chest 01/23/2017 No new pulmonary nodule or lymphadenopathy. Patchy groundglass opacities within the left lower lobe, suggestive of infectious or inflammatory etiology. Followup CT chest in 3 months. Slight increased linear sclerosis along the left anterior sixth rib corresponding to an area of increased uptake on the prior bone scan from 10/21/2016, favored to be related to interval healing changes of a nondisplaced fracture. CT abdomen/pelvis on 01/23/2017 Redemonstration of multiple hepatic metastases, which are similar compared to the prior CT from 10/21/2016. Redemonstration of area of increased sclerosis along the right acetabulum suspicious for metastatic disease. Bone scan on 01/23/2017 Stable subtle uptake within the left proximal diaphysis, again suggestive of metastatic disease  Decreased subtle uptake within the medial right acetabulum.    Decreased uptake within the left anterior sixth rib corresponding to linear sclerosis on the recent CT, favored to be related to an joint rib fracture. Continue FOLFIRI + Avastin and repeat scans in 3 months. Cycle # 13 FOLFIRI + Avastin was on 01/24/2017. Cycle # 14 FOLFIRI + Avastin was on 02/07/2017. Cycle # 15 FOLFIRI + Avastin was on 02/21/2017. Cycle # 16 FOLFIRI + Avastin was on 03/07/2017. Cycle # 17 FOLFIRI + Avastin was on 03/21/2017. Cycle # 18 FOLFIRI + Avastin was on 04/04/2017. CT chest 04/17/2017 negative for metastatic disease. CT abdomen/pelvis 04/17/2017 Stable hypodense metastatic lesions within the liver. Stable area of increased sclerosis along the right acetabulum  Bone scan 04/17/2017 Stable subtle uptake within the left proximal femoral diaphysis; No definite abnormal uptake in the region of a sclerotic lesion along the posterior column of the right acetabulum noted on CT. Stable slight uptake within the left anterior sixth rib corresponding to linear sclerosis on the recent CT, favored to be related to a fracture. Continue FOLFIRI + Avastin and repeat scans in 3 months. Cycle # 19 FOLFIRI + Avastin was on 04/18/2017. CEA 7.5 on 04/18/2017. Cycle # 20 FOLFIRI + Avastin was on 05/02/2017. CEA 7.7 on 05/02/2017. Cycle # 21 FOLFIRI + Avastin was on 05/16/2017. CEA 7.1 on 05/16/2017. Cycle # 22 FOLFIRI + Avastin was on 05/30/2017. CEA 8.2 on 05/30/2017. Cycle # 23 FOLFIRI + Avastin was on 06/13/2017. CEA 7.7 on 06/13/2017. Cycle # 24 FOLFIRI + Avastin was on 06/27/2017. CEA 6.6 on 06/27/2017. Re-staging scans 07/05/2017:  Bone scan stable proximal left femoral diaphysis, right acetabulum, and left anterior sixth rib lesions;  CT abdomen/pelvis stable hypodense metastatic lesions within the liver; CT chest without convincing evidence of metastatic disease. Cycle # 25 FOLFIRI + Avastin was on 07/11/2017. CEA 6.3 on 07/11/2017. Cycle # 26 FOLFIRI + Avastin was on 07/25/2017. CEA 7.3 on 07/25/2017. Cycle # 27 FOLFIRI + Avastin was on 08/08/2017. CEA 6.5 on 08/08/2017.   Cycle # 28 FOLFIRI + Avastin was on 08/22/2017. CEA 5.9 on 08/22/2017. Cycle # 29 FOLFIRI + Avastin was on 09/05/2017. CEA 6.3 on 09/05/2017. Cycle # 30 FOLFIRI + Avastin was on 09/19/2017. CEA 6.3 on 09/19/2017. Bone scan 09/26/2017 stable. CT abdomen/pelvis on 09/26/2017 Stable hypodense mass in the liver. Stable sclerotic lesion in the acetabulum. CT chest 09/26/2017 negative for metastatic disease. Cycle # 31 FOLFIRI + Avastin was on 10/03/2017. CEA 7.4 on 10/03/2017. Cycle # 32 FOLFIRI + Avastin was on 10/17/2017. CEA 6.0 on 10/17/2017. Cycle # 33 FOLFIRI + Avastin was on 10/31/2017. CEA 6.8 on 10/31/2017. Cycle # 34 FOLFIRI + Avastin was on 11/14/2017. CEA 7.2 on 11/14/2017. Cycle # 35 FOLFIRI + Avastin was on 11/28/2017. CEA 5.1 on 11/28/2017. Cycle # 36 FOLFIRI + Avastin was on 12/12/2017. CEA 6.1 on 12/12/2017. Bone scan 12/22/2017 noted no evidence of metastatic disease to the axial or appendicular skeleton. CT chest 12/22/2017 noted no evidence of metastatic disease. CT abdomen/pelvis 12/22/2017 noted stable hypodense lesions in the liver. Continue FOLFIRI + Avastin and repeat scans in 3 months. Cycle # 37 FOLFIRI + Avastin was on 12/27/2018. CEA 6.7 on 12/27/2017. Cycle # 38 FOLFIRI + Avastin was on 01/09/2018. CEA 5.0 on 01/09/2018. Cycle # 39 FOLFIRI + Avastin was on 01/23/2018. CEA 6.5 on 01/23/2018. Cycle # 40 FOLFIRI + Avastin was on 02/06/2018. CEA 6.9 on 02/06/2018. Cycle # 41 FOLFIRI + Avastin was on 02/20/2018. CEA 6.4 on 02/20/2018. Cycle # 42 FOLFIRI + Avastin was on 03/06/2018. CEA 6.6 on 03/06/2018. Cycle # 43 FOLFIRI + Avastin was on 03/20/2018. CEA 5.7 on 03/20/2018. Bone scan on 03/26/2018 negative for metastatic disease. CT chest 03/26/2018 noted ? new 7 mm nodule in LUCY. CT abdomen/pelvis 03/26/2018 noted stable hypodense lesions in the liver. ? New small ascites in the abdomen.   Patient wants to continue to monitor the lung finding and repeat scans in 2 months instead of changing the current chemotherapy regimen to oral Lonsurf. Cycle # 44 FOLFIRI + Avastin was on 04/03/2018. CEA 6.3 on 04/03/2018. Cycle # 45 FOLFIRI + Avastin was on 04/24/2018. CEA 5.3 on 04/24/2018. Cycle # 46 FOLFIRI + Avastin was on 05/08/2018. CEA 5.4 on 05/08/2018. Cycle # 47 FOLFIRI + Avastin was on 05/22/2018. CEA 6.1 on 05/22/2018. CT chest 05/31/2018 noted stable nodule in LUCY. No evidence of worsening malignancy. CT abdomen/pelvis on 05/31/2018 noted stable liver metastasis. No evidence of worsening malignancy. Continue FOLFIRI + Avastin and repeat scans in 3 months. Cycle # 48 FOLFIRI + Avastin was on 06/06/2018. CEA 6.3 on 06/05/2018. Cycle # 49 FOLFIRI + Avastin was on 06/19/2018. CEA 6.1 on 06/19/2018. Cycle # 50 FOLFIRI + Avastin was on 07/03/2018. CEA 7.4 on 07/03/2018. Cycle # 51 FOLFIRI + Avastin was on 07/24/2018. CEA 6.7 on 07/24/2018. Cycle # 52 FOLFIRI + Avastin was on 08/14/2018. CEA 6.8 on 08/14/2018. Cycle # 53 FOLFIRI + Avastin was on 08/28/2018. CEA 6.5 on 08/27/2018. CT chest 09/06/2018 noted interval decrease in size of LUCY measuring up to 4 mm, suggestive of treatment response. No mediastinal or hilar LN  CT abdomen/pelvis 09/06/2018 Slight interval increase in size of a previously noted metastatic lesion within the right hepatic lobe. This may be related to differences in phase of enhancement compared to the most recent study from 5/31/2018, the lesion remains decreased in size compared to the more previous studies. No abdominal, retroperitoneal, or pelvic lymphadenopathy. Continue FOLFIRI + Avastin and repeat scans in 2 months. Cycle # 54 FOLFIRI + Avastin was on 09/11/2018. CEA 6.4 on 09/10/2018. Cycle # 55 FOLFIRI + Avastin was on 09/25/2018. CEA 6.4 on 09/25/2018. Cycle # 56 FOLFIRI + Avastin was on 10/09/2018. CEA 6.7 on 10/08/2018. Cycle # 57 FOLFIRI + Avastin was on 10/23/2018. CEA 7.2 on 10/22/2018.   CT chest 11/02/2018 stable 3 mm nodule within LUCY. No new right and left lung nodule. No mediastinal or osseous lesion. CT abdomen/pelvis 11/02/2018 stable metastatic disease to liver. No new metastatic disease identified. No mesenteric or retroperitoneal LN. No gross colonic lesion identified. Continue FOLFIRI + Avastin and repeat scans in 3 months. Cycle # 58 FOLFIRI + Avastin was on 11/06/2018. CEA 7.6 on 11/05/2018. Cycle # 59 FOLFIRI + Avastin was on 11/27/2018. CEA 6.9 on 11/26/2018. Cycle # 60 FOLFIRI + Avastin was on 12/11/2018. CEA 6.7 on 12/11/2018. Cycle # 61 FOLFIRI + Avastin was on 01/08/2019. CEA 5.6 on 01/07/2019. Cycle # 62 FOLFIRI + Avastin was on 01/29/2019. CEA 4.6 on 01/28/2019. Cycle # 63 FOLFIRI + Avastin was on 02/12/2019. CEA 4.3 on 02/11/2019. CT chest 02/21/2019 no evidence of metastatic disease. CT abdomen/pelvis 02/21/2019 stable hypodense liver lesions. No evidence of worsening metastatic disease. 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 Ordered MRI thoracic spine and referred to neurosurgery team.  Cycle # 64 FOLFIRI + Avastin was on 02/26/2019. CEA 4.7 on 02/25/2019. Cycle # 65 FOLFIRI + Avastin was on 03/12/2019. CEA 4.0 on 03/11/2019. Cycle # 66 FOLFIRI + Avastin was on 03/26/2019. CEA 4.7 on 03/25/2019. Cycle # 67 FOLFIRI + Avastin was on 04/09/2019. CEA 5.6 on 04/08/2019. Cycle # 68 FOLFIRI + Avastin was on 04/30/2019. CEA 4.9 on 04/29/2019. Cycle # 69 FOLFIRI + Avastin was on 05/14/2019. CEA 5.3 on 05/14/2019. CT chest 05/23/2019 stable small lung nodule with no evidence of metastatic disease. CT abdomen/pelvis 05/23/2019 Decrease conspicuity of the liver lesions. No new lesions seen. Stable splenomegaly. Continue FOLFIRI + Avastin and repeat scans in 3 months. Cycle # 70 FOLFIRI + Avastin was on 06/04/2019. CEA 4.8 on 06/03/2019. Cycle # 71 FOLFIRI + Avastin was on 06/18/2019. CEA 4.6 on 06/17/2019. Cycle # 72 FOLFIRI + Avastin was on 07/09/2019.  CEA 4.3 on 07/08/2019. Cycle # 73 FOLFIRI + Avastin was on 07/30/2019. CEA 5.0 on 07/29/2019. Cycle # 74 FOLFIRI + Avastin was on 08/13/2019. CEA 5.0 on 08/12/2019. CT chest 08/20/2019 negative  CT abdomen/pelvis 08/20/2019 Previous identified hepatic lesions are not visualized on the current exam.  Continue FOLFIRI + Avastin and repeat scans in 3 months. Cycle # 75 FOLFIRI + Avastin was on 08/27/2019. CEA 5.0 on 08/26/2019. Cycle # 76 FOLFIRI + Avastin was on 09/17/2019. CEA 5.5 on 09/16/2019. Cycle # 77 FOLFIRI + Avastin was on 10/15/2019. CEA 4.6 on 10/15/2019. Cycle # 78 FOLFIRI + Avastin was on 10/29/2019. CEA 4.1 on 10/28/2019. Cycle # 79 FOLFIRI + Avastin was on 11/12/2019. CEA 4.3 on 11/12/2019. Cycle # 80 FOLFIRI + Avastin was on 12/10/2019. CEA 4.0 on 12/09/2019. CT chest 12/19/2019 Stable 3 mm nodule within the left upper lobe, similar to the previous studies dating back to 11/2/2018, however decreased in size compared to the CT from 3/26/2018. No thoracic LN. CT abdomen/pelvis 12/19/2019 Previously described small hypodense lesions are more conspicuous on the current study compared to the recent study throughout the liver from 8/20/2019, however similar to the prior study from 2/21/2019. Findings may be related to differences in contrast opacification. No abdominal or pelvic lymphadenopathy. Continue FOLFIRI + Avastin and repeat scans in 2 months to f/u on liver lesions. Cycle # 81 FOLFIRI + Avastin was on 01/07/2020. CEA 3.8 on 01/06/2020. Cycle # 82 FOLFIRI + Avastin was on 01/21/2020. CEA 3.7 on 01/20/2020. Cycle # 83 FOLFIRI + Avastin was on 02/04/2020. CEA 4.1 on 02/03/2020. Cycle # 84 FOLFIRI + Avastin was on 02/18/2020. CEA 4.6 on 02/17/2020. CT chest 2/28/2020 no evidence of metastatic disease to the lungs and no mediastinal or hilar adenopathy. CT abdomen pelvis 2/28/2020 no enhancing lesions seen within the liver to suggest metastatic disease.   Wall thickening of the rectosigmoid junction. Images reviewed. Continue FOLFIRI Avastin and repeat scans in 3 months  EGD by Dr. Della Bains 03/02/2020 showing no masses or lesions. Cycle # 85 FOLFIRI + Avastin was on 03/03/2020. CEA 7.4 on 03/02/2020. Cycle # 86 FOLFIRI + Avastin was on 03/17/2020. CEA 4.5 on 03/16/2020. Colonoscopy on 03/23/2020 by Dr. Della Bains unremarkable (records requested). Cycle # 87 FOLFIRI + Avastin was on 03/31/2020. CEA 4.1 on 03/30/2020. Cycle # 88 FOLFIRI + Avastin was on 04/21/2020. CEA 6.4 on 04/20/2020. Cycle # 89 FOLFIRI + Avastin was on 05/05/2020. CEA 5.8 on 05/04/2020. Cycle # 90 FOLFIRI + Avastin was on 06/02/2020. CEA 5.5 on 06/01/2020. Cycle # 91 FOLFIRI + Avastin was on 06/16/2020. CEA 5.6 on 06/15/2020. CT chest 06/23/2020 noted no metastatic disease in the chest.   CT abdomen/pelvis 06/23/2020 Stable small liver lesions measuring up to 5 mm since 2019.   22 mm round mass in segment 8 of the liver with central high density stable from December 2019), previously measuring up to 34 mm in 2017. No additional metastatic disease in the abdomen or pelvis. Small amount of ascites. Overall stable disease. Continue FOLFIRI + Avastin and repeat scans in 2-3 months. Cycle # 92 FOLFIRI + Avastin was on 07/07/2020. CEA 5.7 on 07/06/2020. Cycle # 93 FOLFIRI + Avastin was on 07/21/2020. CEA 5.9 on 07/20/2020. Cycle # 94 FOLFIRI + Avastin was on 08/04/2020. CEA 5.5 on 08/04/2020. Cycle # 95 FOLFIRI + Avastin was on 08/25/2020. CEA 6.1 on 08/24/2020. CT chest 9/2/2020 stable unremarkable enhanced CT of the thorax. There is no metastatic disease to the lungs. CT abdomen pelvis on 9/2/2020 stable 2.2 cm low attenuated lesion within the central aspect of the right hepatic lobe favored to represent treated metastatic disease. No new hepatic lesion is identified. Stable splenomegaly  There is no appreciable colonic lesion or stricture. Pericholecystic fluid of uncertain etiology. Laparoscopic cholecystectomy with normal cholangiogram 10/27/20  Gallbladder: Chronic cholecystitis and cholelithiasis.  Unremarkable regional lymph node. 11/13/2020; Seen by Dr. Tomas Franklin from General surgery team; cleared to re-start chemotherapy. Cycle # 96 FOLFIRI + Avastin was on 11/17/2020. CEA 3.6 on 11/16/2020. Cycle # 97 FOLFIRI + Avastin was on 12/01/2020. CEA 3.5 on 11/30/2020. Cycle # 98 FOLFIRI + Avastin was on 12/15/2020. CEA 4.3 on 12/15/2020. Cycle # 99 FOLFIRI + Avastin was on 01/05/2021. CEA 4.7 on 01/04/2021. CT chest 01/13/2021 negative for metastatic disease. CT abdomen/pelvis 01/13/2021 Unchanged central hepatic lesion. No specific signs of abdominopelvic metastatic disease. Continue FOLFIRI + Avastin and repeat scans in 3 months. Cycle # 100 FOLFIRI + Avastin was on 01/19/2021. CEA 4.7 on 01/18/2021. Cycle # 101 FOLFIRI + Avastin was on 02/02/2021. CEA 5.2 on 02/01/2021. Cycle # 102 FOLFIRI + Avastin was on 02/16/2021. CEA 5.6 on 02/15/2021. Cycle # 103 FOLFIRI + Avastin was on 03/02/2021. Cycle # 104 FOLFIRI (20% dose reduced due to significant toxicity) + Avastin was on 03/16/2021. Cycle # 105 FOLFIRI (same dose as cycle # 104) + Avastin was on 03/30/2021. CEA 6.5 on 03/29/2021. Cycle # 106 FOLFIRI (same dose as cycle # 104) + Avastin was on 04/13/2021. CEA 6.0 on 04/12/2021  CT chest 04/20/2021 no evidence of metastatic disease. CT abdomen/pelvis 04/20/2021 No evidence of progressive metastatic disease. Stable 2 cm lesion in the right lobe of the liver, likely representing treated metastatic disease. Imaging reviewed. Continue FOLFIRI + Avastin and repeat scans in 3 months  Cycle # 107 FOLFIRI + Avastin was on 04/27/2021  Cycle # 108 FOLFIRI + Avastin was on 05/11/2021. CEA 6.4 on 05/10/2021. Cycle # 109 FOLFIRI (held Avastin due to upcoming surgery) on 05/25/2021. CEA 6.3 on 5/24/21  Cycle # 110 FOLFIRI (held Avastin due to upcoming surgery) on 06/08/2021. CEA 6.3 on 6/07/21. CEA 5.8 on 06/28/2021. Right inguinal/scrotol hernia repair on 07/13/2021 with Dr Gerard Brink with folely removal on 07/23/2021. He developed fever on 07/24/2021 and was brought to [address] [place], [place] [address] ER via EMS; Sepsis secondary to urinary tract infection; Completed Abx  CT chest 08/13/2021 no sign of recurrent or metastatic disease. CT abdomen/pelvis 08/13/2021 unchanged hepatic lesions. Splenomegaly and secondary signs of portal venous hypertension  CEA 4.0 on 08/16/2021  CEA 4.1 on 08/30/2021  Cycle # 111 FOLFIRI was on 08/31/2021. HBP team for evaluation of liver lesions; His lesion does appear to be a hemangioma as his prior metastatic deposits in his liver were hypo attenuating compared to this CT which is hyper attenuating. compared to his prior CT scans he has disappearing liver metastasis. MRI liver on 09/17/2021 No suspicious liver lesion identified. Colonoscopy GI Dr. Madina Gresham on 09/20/2021 unremarkable  CEA 3.3 on 09/27/2021. CEA 3.2 on 10/25/2021. CT chest 11/08/2021: Similar chronic appearing findings.  No acute process or evidence of metastatic disease identified. CT Abdomen/pelvis 11/08/2021: No significant change in the appearance of the liver. Similar appearance of splenomegaly. No interval change. CEA 2.7 on 11/22/2021. RTC 4 weeks with prior labs.    MSI testing noted no mismatch repair protein loss of expression    11/23/2021  Minesh Quinones MD  Board Certified Medical Oncologist

## 2021-11-23 NOTE — PROGRESS NOTES
Patient presents to clinic for [facility] today. Left chest  SQ port accessed per policy using 20 G .75 inch Campos needle for good blood return. Site flushed easily with 10 mL NSS followed by 5 mL Heparin solution 100 units/ml rinse prior to de-access. Dry sterile dressing to area. Tolerated procedure well. Encouraged to schedule port flush every 4 weeks.

## 2021-12-27 ENCOUNTER — HOSPITAL ENCOUNTER (OUTPATIENT)
Age: 71
Discharge: HOME | End: 2021-12-27
Payer: MEDICARE

## 2021-12-27 DIAGNOSIS — C20 RECTAL CANCER METASTASIZED TO LIVER (HCC): ICD-10-CM

## 2021-12-27 DIAGNOSIS — C78.7 RECTAL CANCER METASTASIZED TO LIVER (HCC): ICD-10-CM

## 2021-12-27 LAB
ALBUMIN SERPL-MCNC: 3.8 G/DL (ref 3.5–5.2)
ALP BLD-CCNC: 133 U/L (ref 40–129)
ALT SERPL-CCNC: 11 U/L (ref 0–40)
ANION GAP SERPL CALCULATED.3IONS-SCNC: 13 MMOL/L (ref 7–16)
AST SERPL-CCNC: 21 U/L (ref 0–39)
BASOPHILS ABSOLUTE: 0.02 E9/L (ref 0–0.2)
BASOPHILS RELATIVE PERCENT: 0.4 % (ref 0–2)
BILIRUB SERPL-MCNC: 0.6 MG/DL (ref 0–1.2)
BUN BLDV-MCNC: 24 MG/DL (ref 6–23)
CALCIUM SERPL-MCNC: 8.9 MG/DL (ref 8.6–10.2)
CEA: 2.5 NG/ML (ref 0–5.2)
CHLORIDE BLD-SCNC: 102 MMOL/L (ref 98–107)
CO2: 23 MMOL/L (ref 22–29)
CREAT SERPL-MCNC: 1.3 MG/DL (ref 0.7–1.2)
EOSINOPHILS ABSOLUTE: 0.13 E9/L (ref 0.05–0.5)
EOSINOPHILS RELATIVE PERCENT: 2.7 % (ref 0–6)
GFR AFRICAN AMERICAN: >60
GFR NON-AFRICAN AMERICAN: 54 ML/MIN/1.73
GLUCOSE BLD-MCNC: 123 MG/DL (ref 74–99)
HCT VFR BLD CALC: 30.3 % (ref 37–54)
HEMOGLOBIN: 10.1 G/DL (ref 12.5–16.5)
IMMATURE GRANULOCYTES #: 0.02 E9/L
IMMATURE GRANULOCYTES %: 0.4 % (ref 0–5)
LYMPHOCYTES ABSOLUTE: 0.57 E9/L (ref 1.5–4)
LYMPHOCYTES RELATIVE PERCENT: 11.8 % (ref 20–42)
MCH RBC QN AUTO: 30.8 PG (ref 26–35)
MCHC RBC AUTO-ENTMCNC: 33.3 % (ref 32–34.5)
MCV RBC AUTO: 92.4 FL (ref 80–99.9)
MONOCYTES ABSOLUTE: 0.41 E9/L (ref 0.1–0.95)
MONOCYTES RELATIVE PERCENT: 8.5 % (ref 2–12)
NEUTROPHILS ABSOLUTE: 3.67 E9/L (ref 1.8–7.3)
NEUTROPHILS RELATIVE PERCENT: 76.2 % (ref 43–80)
PDW BLD-RTO: 13.5 FL (ref 11.5–15)
PLATELET # BLD: 97 E9/L (ref 130–450)
PLATELET CONFIRMATION: NORMAL
PMV BLD AUTO: 10.5 FL (ref 7–12)
POTASSIUM SERPL-SCNC: 3.5 MMOL/L (ref 3.5–5)
RBC # BLD: 3.28 E12/L (ref 3.8–5.8)
SODIUM BLD-SCNC: 138 MMOL/L (ref 132–146)
TOTAL PROTEIN: 7.2 G/DL (ref 6.4–8.3)
WBC # BLD: 4.8 E9/L (ref 4.5–11.5)

## 2021-12-27 PROCEDURE — 36415 COLL VENOUS BLD VENIPUNCTURE: CPT

## 2021-12-27 PROCEDURE — 80053 COMPREHEN METABOLIC PANEL: CPT

## 2021-12-27 PROCEDURE — 85025 COMPLETE CBC W/AUTO DIFF WBC: CPT

## 2021-12-27 PROCEDURE — 82378 CARCINOEMBRYONIC ANTIGEN: CPT

## 2021-12-28 ENCOUNTER — HOSPITAL ENCOUNTER (OUTPATIENT)
Dept: INFUSION THERAPY | Age: 71
Discharge: HOME | End: 2021-12-28
Payer: MEDICARE

## 2021-12-28 ENCOUNTER — OFFICE VISIT (OUTPATIENT)
Dept: ONCOLOGY | Age: 71
End: 2021-12-28
Payer: MEDICARE

## 2021-12-28 VITALS
HEIGHT: 73 IN | HEART RATE: 65 BPM | OXYGEN SATURATION: 100 % | SYSTOLIC BLOOD PRESSURE: 142 MMHG | TEMPERATURE: 97.7 F | DIASTOLIC BLOOD PRESSURE: 70 MMHG | BODY MASS INDEX: 27.18 KG/M2 | WEIGHT: 205.1 LBS

## 2021-12-28 DIAGNOSIS — C20 RECTAL CANCER (HCC): Primary | ICD-10-CM

## 2021-12-28 DIAGNOSIS — C78.7 RECTAL CANCER METASTASIZED TO LIVER (HCC): Primary | ICD-10-CM

## 2021-12-28 DIAGNOSIS — C20 RECTAL CANCER METASTASIZED TO LIVER (HCC): Primary | ICD-10-CM

## 2021-12-28 PROCEDURE — 96523 IRRIG DRUG DELIVERY DEVICE: CPT

## 2021-12-28 PROCEDURE — 99214 OFFICE O/P EST MOD 30 MIN: CPT | Performed by: INTERNAL MEDICINE

## 2021-12-28 PROCEDURE — 6360000002 HC RX W HCPCS: Performed by: INTERNAL MEDICINE

## 2021-12-28 PROCEDURE — 2580000003 HC RX 258: Performed by: INTERNAL MEDICINE

## 2021-12-28 PROCEDURE — 99214 OFFICE O/P EST MOD 30 MIN: CPT

## 2021-12-28 RX ORDER — HEPARIN SODIUM (PORCINE) LOCK FLUSH IV SOLN 100 UNIT/ML 100 UNIT/ML
500 SOLUTION INTRAVENOUS PRN
Status: DISCONTINUED | OUTPATIENT
Start: 2021-12-28 | End: 2021-12-29 | Stop reason: HOSPADM

## 2021-12-28 RX ORDER — HEPARIN SODIUM (PORCINE) LOCK FLUSH IV SOLN 100 UNIT/ML 100 UNIT/ML
500 SOLUTION INTRAVENOUS PRN
Status: CANCELLED | OUTPATIENT
Start: 2021-12-28

## 2021-12-28 RX ORDER — SODIUM CHLORIDE 0.9 % (FLUSH) 0.9 %
10 SYRINGE (ML) INJECTION PRN
Status: CANCELLED | OUTPATIENT
Start: 2021-12-28

## 2021-12-28 RX ORDER — SODIUM CHLORIDE 0.9 % (FLUSH) 0.9 %
10 SYRINGE (ML) INJECTION PRN
Status: DISCONTINUED | OUTPATIENT
Start: 2021-12-28 | End: 2021-12-29 | Stop reason: HOSPADM

## 2021-12-28 RX ADMIN — Medication 500 UNITS: at 09:31

## 2021-12-28 RX ADMIN — SODIUM CHLORIDE, PRESERVATIVE FREE 10 ML: 5 INJECTION INTRAVENOUS at 09:30

## 2021-12-28 NOTE — PROGRESS NOTES
[place] [id number]  Attending Clinic Note     Reason for Visit: Follow-up on a patient with Metastatic Rectal Cancer.     PCP: Annette Freitas MD     History of Present Illness:  67 y/o  male who was referred to see Dr. Tomi Webb (GI team) for evaluation of bright red blood per rectum, mild anemia and change in bowel habits with diarrhea. CEA 2640 on 10/19/2015. AlcP 299 AST 57 ALT 75 on 10/19/2015. Colonoscopy in 2013 noted no significant polyps, colitis or lesions at that time. Denies any Family History of colorectal cancer or polyps.     Colonoscopy on 10/19/2015 revealed:  1. Ascending polyp, 8 mm, hot snare: Tubulovillous adenoma. 2. Transverse polyp, 1 cm, hot snare: Serrated polyp most consistent with sessile serrated adenoma. 3. Five splenic flexure polyps, three - 5 mm, 7 mm, 8 mm, hot snare and biopsy: Four segments of Tubular Adenoma  4. Descending polyp, 5 mm, biopsy: Tubular adenoma. 5. Sigmoid polyp, 4 mm biopsy, Serrated polyp most consistent with sessile serrated adenoma. 6. Two rectal polyps, 4 mm biopsy: Serrated polyp most consistent with hyperplastic polyp. 7. Rectosigmoid colon mass (large mass approximately 65% circumference of the lumen; Very friable, firm and hard): Tubulovillous adenoma with associated focal erosion and fibroplasia.      CT scan abdomen/pelvis on 10/26/2015:  1. Small nodules at lung bases likely represent metastatic colon   cancer. 2. Extensive likely metastatic colon cancer throughout the liver.      3. Mild mural thickening and luminal narrowing in the   terminal ileum, otherwise nonspecific.      Colonoscopy with snare removal rectal mass was performed by Dr. Alfredo Raman. Pathology proved:  Rectal polyp: Invasive adenocarcinoma involving villous adenoma and extending to the cauterized edge of excision. KRAS Mutation: Mutation detected. BRAF Mutation: Mutation not detected.   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     CEA 3488. Bone scan on 11/10/2015 noted no metastatic disease. CT chest on 11/10/2015 revealed Multiple pulmonary nodules in the upper and lower lobes consistent with metastatic disease;   Hepatic metastasis also visualized. 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 Mediport was placed. Cycle # 1 of FOLFOX + Avastin was on 11/23/2015. CEA was 4555 on 11/23/2015. Cycle # 2 of FOLFOX + Avastin was on 12/08/2015. CEA was 3160 on 12/08/2015. Cycle # 3 of FOLFOX + Avastin was on 12/22/2015. CEA was 2516 on 12/21/2015. Cycle # 4 of FOLFOX + Avastin was on 01/05/2016. CEA was 2098 on 01/05/2015. Cycle # 5 of FOLFOX + Avastin was on 01/19/2016. CEA was 1511 on 01/05/2015.     -Bone scan on 01/26/2016 noted no metastatic disease. CT chest on 01/26/2016 revealed Significant response to treatment with no visible residual nodules. CT scan abdomen/pelvis on 01/26/2016 noted Interval decreased size of multiple masses in the liver compatible with treatment response. Continue another 2 months of FOLFOX + Avastin and repeat scans. Cycle # 6 of FOLFOX + Avastin was on 02/02/2016.  on 02/02/2016. Cycle # 7 of FOLFOX + Avastin was on 02/16/2016.  on 02/16/2016. Cycle # 8 of FOLFOX + Avastin was on 03/01/2016.  on 03/01/2016. Cycle # 9 of FOLFOX + Avastin was on 03/15/2016.  on 03/15/2016. Cycle # 10 of FOLFOX + Avastin was on 03/29/2016. .4 on 03/29/2016. Cycle # 11 of FOLFOX + Avastin was on 04/12/2016. .4 on 04/12/2016.     Re-staging scans on 04/19/2016: CT Chest: clear lungs; no evidence of recurring pulmonary nodule; CT Abdomen/Pelvis: Further interval decrease in size of the multiple metastatic hepatic lesions, the largest lesion now measures 3.9 x 3.5 cm and previously measured 4.5 cm in maximum diameter.  No mesenteric lymphadenopathy is identified; Bone Scan: No evidence of osseous metastasis. Continue same regimen and re-stage in 2-3 months. Cycle # 12 FOLFOX + Avastin was on 04/26/2016. .5 on 04/26/2016. Cycle # 13 FOLFOX + Avastin was on 05/10/2016. .3 on 05/10/2016. Cycle # 14 FOLFOX+AVASTIN was on 05/24/2016. .5 on 05/24/2016. Cycle # 15 FOLFOX + Avastin was on 06/07/2016. CEA 90.5 on 06/07/2016. Admitted to [facility] 06/13/2016-06/16/2016 for abdominal pain: EGD noted 1.5 cm clean based duodenal bulb ulceration s/p epinephrine and bicap per Dr. Selam Martinez. No active bleeding. A. Stomach, biopsy: Mild chronic gastritis, immunostain negative for Helicobacter  B. Esophagus, biopsy: Gastric glandular mucosa with prominent intestinal metaplasia (Madrid's epithelium), negative for epithelial dysplasia, esophageal squamous mucosa not identified  Cycle # 16 FOLFOX (discontinued avastin (bevacizumab) given association of peptic ulcer disease and known association of GI perforation) was on 06/21/2016. Cycle # 17 FOLFOX was on 07/05/2016. CEA 52.6 on 07/19/2016. Cycle # 17 FOLFOX was on 07/05/2016. CEA 52.6 on 07/05/2016. CEA 47.6 on 07/19/2016.     Re-staging scans 07/19/2106: CT Chest negative for metastatic disease. CT Abdomen/Pelvis: Stable hepatic lesions; Question new mural thickening in the cecum. Bone Scan: New Let hip lesion suspicious for bone metastasis.     Increased CEA; new mural thickening in the cecum and new left hip lesion suspicious for bone metastasis are consistent with disease progression; He derived maximum benefit from FOLFOX/AVASTIN. D/C FOLFOX/AVASTIN. We recommended FOLFIRI second line therapy. Cycle # 1 FOLFIRI was on 08/02/2016. CEA 40.8 on 08/02/2016. Xgeva q4 weeks started on 08/02/2016. Cycle # 2 FOLFIRI was on 08/16/2016. CEA 31.7 on 08/16/2016. Cycle # 3 FOLFIRI (added Avastin) was on 08/30/2016 given that ulcers healed on EGD 08/15/2016 by Dr. Danielle Camarena; Protonix bid since avastin re-started.    Colonoscopy on 08/29/2016 (to look at the cecal area) unremarkable. CEA 26.7 on 08/30/2016. Cycle # 4 FOLFIRI/Avastin was on 09/13/2016. CEA 23.4 on 09/13/2016. Cycle # 5 FOLFIRI/Avastin was on 09/27/2016. CEA 22.3 on 09/27/2016. Cycle # 6 FOLFIRI/Avastin was on 10/11/2016. CEA 18.4 on 10/11/2016.      Bone scan 10/21/2016 stable bone metastasis; ? New lesion anterior left sixth rib likely interval healing fracture. CT abdomen/pelvis revealed stable hepatic metastasis. CT chest negative for metastatic disease. Continue FOLFIRI/Avastin and repeat scans in 3 months. Cycle # 7 FOLFIRI/Avastin was on 10/25/2016. CEA 16.1  Cycle # 8 FOLFIRI/Avastin was on 11/08/2016. CEA 15.7  Cycle # 9 FOLFIRI/Avastin was on 11/29/2016. CEA 13.6 on 11/29/2016. Cycle # 10 FOLFIRI/Avastin was on 12/13/2016. CEA 10.6 on 12/13/2016. Cycle # 11 FOLFIRI/Avastin was on 12/27/2016. CEA 11.1 on 12/27/2016. Cycle # 12 FOLFIRI/Avastin was on 01/10/2017. CEA 9.7 on 01/10/2017.       CT chest 01/23/2017 No new pulmonary nodule or lymphadenopathy. Patchy groundglass opacities within the left lower lobe, suggestive of infectious or inflammatory etiology. Followup CT chest in 3 months. Slight increased linear sclerosis along the left anterior sixth rib corresponding to an area of increased uptake on the prior bone scan from 10/21/2016, favored to be related to interval healing changes of a nondisplaced fracture. CT abdomen/pelvis on 01/23/2017 Redemonstration of multiple hepatic metastases, which are similar compared to the prior CT from 10/21/2016. Redemonstration of area of increased sclerosis along the right acetabulum suspicious for metastatic disease. Bone scan on 01/23/2017 Stable subtle uptake within the left proximal diaphysis, again suggestive of metastatic disease  Decreased subtle uptake within the medial right acetabulum.    Decreased uptake within the left anterior sixth rib corresponding to linear sclerosis on the recent CT, favored to be related to an joint rib fracture. Continue FOLFIRI + Avastin and repeat scans in 3 months. Cycle # 13 FOLFIRI + Avastin was on 01/24/2017. Cycle # 14 FOLFIRI + Avastin was on 02/07/2017. Cycle # 15 FOLFIRI + Avastin was on 02/21/2017. Cycle # 16 FOLFIRI + Avastin was on 03/07/2017. Cycle # 17 FOLFIRI + Avastin was on 03/21/2017. Cycle # 18 FOLFIRI + Avastin was on 04/04/2017. CT chest 04/17/2017 negative for metastatic disease. CT abdomen/pelvis 04/17/2017 Stable hypodense metastatic lesions within the liver. Stable area of increased sclerosis along the right acetabulum  Bone scan 04/17/2017 Stable subtle uptake within the left proximal femoral diaphysis; No definite abnormal uptake in the region of a sclerotic lesion along the posterior column of the right acetabulum noted on CT. Stable slight uptake within the left anterior sixth rib corresponding to linear sclerosis on the recent CT, favored to be related to a fracture. Continue FOLFIRI + Avastin and repeat scans in 3 months. Cycle # 19 FOLFIRI + Avastin was on 04/18/2017. Cycle # 20 FOLFIRI + Avastin was on 05/02/2017. Cycle # 21 FOLFIRI + Avastin was on 05/16/2017. Cycle # 22 FOLFIRI + Avastin was on 05/30/2017. Cycle # 23 FOLFIRI + Avastin was on 06/13/2017. Cycle # 24 FOLFIRI + Avastin was on 06/27/2017. Cycle # 25 FOLFIRI + Avastin was on 07/11/2017. Cycle # 26 FOLFIRI + Avastin was on 07/25/2017. Cycle # 27 FOLFIRI + Avastin was on 08/08/2017. Cycle # 28 FOLFIRI + Avastin was on 08/22/2017. Cycle # 29 FOLFIRI + Avastin was on 09/05/2017. Cycle # 30 FOLFIRI + Avastin was on 09/19/2017. Bone scan 09/26/2017 stable. CT abdomen/pelvis on 09/26/2017 Stable hypodense mass in the liver. Stable sclerotic lesion in the acetabulum. CT chest 09/26/2017 negative for metastatic disease. Cycle # 31 FOLFIRI + Avastin was on 10/03/2017. CEA 7.4 on 10/03/2017. Cycle # 32 FOLFIRI + Avastin was on 10/17/2017. CEA 6.0 on 10/17/2017.   Cycle # 33 FOLFIRI + Avastin was on 10/31/2017. CEA 6.8 on 10/31/2017. Cycle # 34 FOLFIRI + Avastin was on 11/14/2017. CEA 7.2 on 11/14/2017. Cycle # 35 FOLFIRI + Avastin was on 11/28/2017. CEA 5.1 on 11/28/2017. Cycle # 36 FOLFIRI + Avastin was on 12/12/2017. CEA 6.1 on 12/12/2017. Bone scan 12/22/2017 noted no evidence of metastatic disease to the axial or appendicular skeleton. CT chest 12/22/2017 noted no evidence of metastatic disease. CT abdomen/pelvis 12/22/2017 noted stable hypodense lesions in the liver. Continue FOLFIRI + Avastin and repeat scans in 3 months. Cycle # 37 FOLFIRI + Avastin was on 12/27/2018. Cycle # 38 FOLFIRI + Avastin was on 01/09/2018. Cycle # 39 FOLFIRI + Avastin was on 01/23/2018. Cycle # 40 FOLFIRI + Avastin was on 02/06/2018. Cycle # 41 FOLFIRI + Avastin was on 02/20/2018. Cycle # 42 FOLFIRI + Avastin was on 03/06/2018. Cycle # 43 FOLFIRI + Avastin was on 03/20/2018. Bone scan on 03/26/2018 negative for metastatic disease. CT chest 03/26/2018 noted ? new 7 mm nodule in LUCY. CT abdomen/pelvis 03/26/2018 noted stable hypodense lesions in the liver. ? New small ascites in the abdomen. Patient wants to continue to monitor the lung finding and repeat scans in 2 months instead of changing the current regimen into oral Lonsurf. Cycle # 44 FOLFIRI + Avastin was on 04/03/2018. CEA 6.3 on 04/03/2018. Cycle # 45 FOLFIRI + Avastin was on 04/24/2018. CEA 5.3 on 04/24/2018. Cycle # 46 FOLFIRI + Avastin was on 05/08/2018. CEA 5.4 on 05/08/2018. Cycle # 47 FOLFIRI + Avastin was on 05/22/2018. CEA 6.1 on 05/22/2018. CT chest 05/31/2018 noted stable nodule in LUCY. No evidence of worsening malignancy. CT abdomen/pelvis on 05/31/2018 noted stable liver metastasis. No evidence of worsening malignancy. Continue FOLFIRI + Avastin and repeat scans in 3 months. Cycle # 48 FOLFIRI + Avastin was on 06/06/2018. Cycle # 49 FOLFIRI + Avastin was on 06/19/2018.    Cycle # 50 FOLFIRI + Avastin was on 07/03/2018. Cycle # 51 FOLFIRI + Avastin was on 07/24/2018. Cycle # 52 FOLFIRI + Avastin was on 08/14/2018. Cycle # 53 FOLFIRI + Avastin was on 08/28/2018. CT chest 09/06/2018 noted interval decrease in size of LUCY measuring up to 4 mm, suggestive of treatment response. No mediastinal or hilar LN  CT abdomen/pelvis 09/06/2018 Slight interval increase in size of a previously noted metastatic lesion within the right hepatic lobe. This may be related to differences in phase of enhancement compared to the most recent study from 5/31/2018, the lesion remains decreased in size compared to the more previous studies. No abdominal, retroperitoneal, or pelvic lymphadenopathy. Continue FOLFIRI + Avastin and repeat scans in 2 months. Cycle # 54 FOLFIRI + Avastin was on 09/11/2018. Cycle # 55 FOLFIRI + Avastin was on 09/25/2018. Cycle # 56 FOLFIRI + Avastin was on 10/09/2018. Cycle # 57 FOLFIRI + Avastin was on 10/23/2018. CT chest 11/02/2018 stable 3 mm nodule within LUCY. No new right and left lung nodule. No mediastinal or osseous lesion. CT abdomen/pelvis 11/02/2018 stable metastatic disease to liver. No new metastatic disease identified. No mesenteric or retroperitoneal LN. No gross colonic lesion identified. Continue FOLFIRI + Avastin and repeat scans in 3 months. Cycle # 58 FOLFIRI + Avastin was on 11/06/2018. CEA 7.6 on 11/05/2018. Cycle # 59 FOLFIRI + Avastin was on 11/27/2018. CEA 6.9 on 11/26/2018. Cycle # 60 FOLFIRI + Avastin was on 12/11/2018. CEA 6.7 on 12/11/2018. Cycle # 61 FOLFIRI + Avastin was on 01/08/2019. CEA 5.6 on 01/07/2019. Cycle # 62 FOLFIRI + Avastin was on 01/29/2019. CEA 4.6 on 01/28/2019. Cycle # 63 FOLFIRI + Avastin was on 02/12/2019. CEA 4.3 on 02/11/2019. CT chest 02/21/2019 no evidence of metastatic disease. CT abdomen/pelvis 02/21/2019 stable hypodense liver lesions. No evidence of worsening metastatic disease.  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 Ordered MRI thoracic spine and referred to neurosurgery team.    Cycle # 64 FOLFIRI + Avastin was on 02/26/2019. CEA 4.7 on 02/25/2019. Cycle # 65 FOLFIRI + Avastin was on 03/12/2019. CEA 4.0 on 03/11/2019. Cycle # 66 FOLFIRI + Avastin was on 03/26/2019. CEA 4.7 on 03/25/2019. Cycle # 67 FOLFIRI + Avastin was on 04/09/2019. CEA 5.6 on 04/08/2019. Cycle # 68 FOLFIRI + Avastin was on 04/30/2019. CEA 4.9 on 04/29/2019. Cycle # 69 FOLFIRI + Avastin was on 05/14/2019. CEA 5.3 on 05/14/2019. CT chest 05/23/2019 stable small lung nodule with no evidence of metastatic disease. CT abdomen/pelvis 05/23/2019 Decrease conspicuity of the liver lesions. No new lesions seen. Stable splenomegaly. Continue FOLFIRI + Avastin and repeat scans in 3 months. Cycle # 70 FOLFIRI + Avastin was on 06/04/2019. CEA 4.8 on 06/03/2019. Cycle # 71 FOLFIRI + Avastin was on 06/18/2019. CEA 4.6 on 06/17/2019. Cycle # 72 FOLFIRI + Avastin was on 07/09/2019. CEA 4.3 on 07/08/2019. Cycle # 73 FOLFIRI + Avastin was on 07/30/2019. CEA 5.0 on 07/29/2019. Cycle # 74 FOLFIRI + Avastin was on 08/13/2019. CEA 5.0 on 08/12/2019. CT chest 08/20/2019 negative  CT abdomen/pelvis 08/20/2019 Previous identified hepatic lesions are not visualized on the current exam.  Continue FOLFIRI + Avastin and repeat scans in 3 months. Cycle # 75 FOLFIRI + Avastin was on 08/27/2019. CEA 5.0 on 08/26/2019. Cycle # 76 FOLFIRI + Avastin was on 09/17/2019. CEA 5.5 on 09/16/2019. Cycle # 77 FOLFIRI + Avastin was on 10/15/2019. CEA 4.6 on 10/15/2019. Cycle # 78 FOLFIRI + Avastin was on 10/29/2019. CEA 4.1 on 10/28/2019. Cycle # 79 FOLFIRI + Avastin was on 11/12/2019. CEA 4.3 on 11/12/2019. Cycle # 80 FOLFIRI + Avastin was on 12/10/2019. CEA 4.0 on 12/09/2019.   CT chest 12/19/2019 Stable 3 mm nodule within the left upper lobe, similar to the previous studies dating back to 11/2/2018, however decreased in size compared to the CT from 3/26/2018. No thoracic LN. CT abdomen/pelvis 12/19/2019 Previously described small hypodense lesions are more conspicuous on the current study compared to the recent study throughout the liver from 8/20/2019, however similar to the prior study from 2/21/2019. Findings may be related to differences in contrast opacification. No abdominal or pelvic lymphadenopathy. Continue FOLFIRI + Avastin and repeat scans in 2 months to f/u on liver lesions. Cycle # 81 FOLFIRI + Avastin was on 01/07/2020. CEA 3.8 on 01/06/2020. Cycle # 82 FOLFIRI + Avastin was on 01/21/2020. CEA 3.7 on 01/20/2020. Cycle # 83 FOLFIRI + Avastin was on 02/04/2020. CEA 4.1 on 02/03/2020. Cycle # 84 FOLFIRI + Avastin was on 02/18/2020. CEA 4.6 on 02/17/2020. EGD by Dr. Mckayla Weinberg 03/02/2020 showing no masses or lesions  Cycle # 85 FOLFIRI + Avastin was on 03/03/2020. CEA 7.4 on 03/02/2020. Cycle # 86 FOLFIRI + Avastin was on 03/17/2020. CEA 4.5 on 03/16/2020. Colonoscopy on 03/23/2020 by Dr. Mckayla Weinberg unremarkable (records requested). Cycle # 87 FOLFIRI + Avastin was on 03/31/2020. CEA 4.1 on 03/30/2020. Cycle # 88 FOLFIRI + Avastin was on 04/21/2020. CEA 6.4 on 04/20/2020. Cycle # 89 FOLFIRI + Avastin was on 05/05/2020. CEA 5.8 on 05/04/2020. Cycle # 90 FOLFIRI + Avastin was on 06/02/2020. CEA 5.5 on 06/01/2020. Cycle # 91 FOLFIRI + Avastin was on 06/16/2020. CEA 5.6 on 06/15/2020. CT chest 06/23/2020 noted no metastatic disease in the chest.   CT abdomen/pelvis 06/23/2020 Stable small liver lesions measuring up to 5 mm since 2019.   22 mm round mass in segment 8 of the liver with central high density stable from December 2019), previously measuring up to 34 mm in 2017. No additional metastatic disease in the abdomen or pelvis. Small amount of ascites. Overall stable disease. Continue FOLFIRI + Avastin and repeat scans in 2-3 months. Cycle # 92 FOLFIRI + Avastin was on 07/07/2020. CEA 5.7 on 07/06/2020. Cycle # 93 FOLFIRI + Avastin was on 07/21/2020. CEA 5.9 on 07/20/2020. Cycle # 94 FOLFIRI + Avastin was on 08/04/2020. CEA 5.5 on 08/04/2020. Cycle # 95 FOLFIRI + Avastin was on 08/25/2020. CEA 6.1 on 08/24/2020. CT chest 9/2/2020 stable unremarkable enhanced CT of the thorax. There is no metastatic disease to the lungs. CT abdomen pelvis on 9/2/2020 stable 2.2 cm low attenuated lesion within the central aspect of the right hepatic lobe favored to represent treated metastatic disease. No new hepatic lesion is identified. Stable splenomegaly  There is no appreciable colonic lesion or stricture  Pericholecystic fluid of uncertain etiology. General surgery team consulted. Laparoscopic cholecystectomy with normal cholangiogram 10/27/20  Gallbladder: Chronic cholecystitis and cholelithiasis.  Unremarkable regional lymph node. 11/13/2020; Seen by Dr. Rhonda Tubbs from General surgery team; cleared to re-start chemotherapy. Cycle # 96 FOLFIRI + Avastin was on 11/17/2020. CEA 3.6 on 11/16/2020. Cycle # 97 FOLFIRI + Avastin was on 12/01/2020. CEA 3.5 on 11/30/2020. Cycle # 98 FOLFIRI + Avastin was on 12/15/2020. CEA 4.3 on 12/15/2020. Cycle # 99 FOLFIRI + Avastin was on 01/05/2021. CEA 4.7 on 01/04/2021. CT chest 01/13/2021 negative for metastatic disease. CT abdomen/pelvis 01/13/2021 Unchanged central hepatic lesion. No specific signs of abdominopelvic metastatic disease. Continue FOLFIRI + Avastin and repeat scans in 3 months. Cycle # 100 FOLFIRI + Avastin was on 01/19/2021. CEA 4.7 on 01/18/2021. Cycle # 101 FOLFIRI + Avastin was on 02/02/2021. CEA 5.2 on 02/01/2021. Cycle # 102 FOLFIRI + Avastin was on 02/16/2021. CEA 5.6 on 02/15/2021. Cycle # 103 FOLFIRI + Avastin was on 03/02/2021. Cycle # 104 FOLFIRI (20% dose reduced due to significant toxicity) + Avastin was on 03/16/2021. Cycle # 105 FOLFIRI (same dose as cycle # 104) + Avastin was on 03/30/2021. CEA 6.5 on 03/29/2021.   Cycle # 106 FOLFIRI (same dose as cycle # 104) + Avastin was on 04/13/2021. CEA 6.0 on 04/12/2021  CT chest 04/20/2021 no evidence of metastatic disease. CT abdomen/pelvis 04/20/2021 No evidence of progressive metastatic disease. Stable 2 cm lesion in the right lobe of the liver, likely representing treated metastatic disease. Imaging reviewed. Continue FOLFIRI + Avastin and repeat scans in 3 months  Cycle # 107 FOLFIRI + Avastin was on 04/27/2021  Cycle # 108 FOLFIRI + Avastin was on 05/11/2021. Cycle # 109 FOLFIRI (held Avastin due to upcoming surgery) on 05/25/2021. CEA 6.3 on 5/24/21  Cycle # 110 FOLFIRI (held Avastin due to upcoming surgery) on 06/08/2021. CEA 6.3 on 6/07/21. Right inguinal/scrotol hernia repair on 07/13/2021 with Dr Justin Pulliam with folely removal on Friday 07/23/2021. He developed fever on 07/24/2021 and was brought to [address] [place] [address],[address] ER via EMS; Sepsis secondary to urinary tract infection   Completed Abx  CEA 4.1 on 08/30/2021  Cycle # 111 FOLFIRI was on 08/31/2021. HBP team for evaluation of liver lesions; His lesion does appear to be a hemangioma as his prior metastatic deposits in his liver were hypo attenuating compared to this CT which is hyper attenuating. compared to his prior CT scans he has disappearing liver metastasis. MRI liver on 09/17/2021 No suspicious liver lesion identified. Colonoscopy GI Dr. Moni Cook on 09/20/2021 unremarkable  CEA 3.3 on 09/27/2021. CEA 3.2 on 10/25/2021. CEA 2.7 on 11/22/2021. CEA 2.5 on 12/27/2021. Today 12/28/2021 for f/u. No fever, chills. No nausea/vomiting. Fair appetite and energy level. + arthritis. Review of Systems;  CONSTITUTIONAL: No fever, chills. Fair appetite and energy level  ENMT: Eyes: No diplopia; Nose: No epistaxis. Mouth:No lesions  RESPIRATORY: No hemoptysis, shortness of breath. CARDIOVASCULAR: No chest pain, palpitations. GASTROINTESTINAL:No N/V, abdominal pain. GENITOURINARY: No dysuria, urinary frequency, hematuria.   MSK: + identified; Bone Scan: No evidence of osseous metastasis. Continue same regimen and re-stage in 2-3 months. Cycle # 12 FOLFOX + Avastin was on 04/26/2016. .5 on 04/26/2016. Cycle # 13 FOLFOX + Avastin was on 05/10/2016. .3 on 05/10/2016. Cycle # 14 FOLFOX+AVASTIN was on 05/24/2016. .5 on 05/24/2016. Cycle # 15 FOLFOX + Avastin was on 06/07/2016. CEA 90.5 on 06/07/2016. Admitted to [facility] 06/13/2016-06/16/2016 for abdominal pain: EGD noted 1.5 cm clean based duodenal bulb ulceration s/p epinephrine and bicap per Dr. Ellen Tsang. No active bleeding. A. Stomach, biopsy: Mild chronic gastritis, immunostain negative for Helicobacter  B. Esophagus, biopsy: Gastric glandular mucosa with prominent ntestinal metaplasia (Madrid's epithelium), negative for epithelial dysplasia, esophageal squamous mucosa not identified     Cycle # 16 FOLFOX (discontinued avastin (bevacizumab) given association of peptic ulcer disease and known association of GI perforation.) was on 06/21/2016. CEA 47 on 06/21/2016. Cycle # 17 FOLFOX was on 07/05/2016. CEA 52.6 on 07/05/2016. CEA 47.6 on 07/19/2016.     Re-staging scans 07/19/2106: CT Chest negative for metastatic disease. CT Abdomen/Pelvis: Stable hepatic lesions; Question new mural thickening in the cecum. Bone Scan: New Let hip lesion suspicious for bone metastasis.     Increased CEA; new mural thickening in the cecum and new left hip lesion suspicious for bone metastasis are consistent with disease progression; He derived maximum benefit from FOLFOX/AVASTIN. D/C FOLFOX/AVASTIN. We recommended FOLFIRI second line therapy. Cycle # 1 FOLFIRI was on 08/02/2016. CEA 40.8 on 08/02/2016. Xgeva q4 weeks started on 08/02/2016. Cycle # 2 FOLFIRI was on 08/16/2016. CEA 31.7 on 08/16/2016. Cycle # 3 FOLFIRI (added Avastin) was on 08/30/2016 given that ulcers healed on EGD 08/15/2016 by Dr. Eladia Lafleur; Protonix bid since avastin re-started.    Colonoscopy on 08/29/2016 (to 6.5 on 08/08/2017. Cycle # 28 FOLFIRI + Avastin was on 08/22/2017. CEA 5.9 on 08/22/2017. Cycle # 29 FOLFIRI + Avastin was on 09/05/2017. CEA 6.3 on 09/05/2017. Cycle # 30 FOLFIRI + Avastin was on 09/19/2017. CEA 6.3 on 09/19/2017. Bone scan 09/26/2017 stable. CT abdomen/pelvis on 09/26/2017 Stable hypodense mass in the liver. Stable sclerotic lesion in the acetabulum. CT chest 09/26/2017 negative for metastatic disease. Cycle # 31 FOLFIRI + Avastin was on 10/03/2017. CEA 7.4 on 10/03/2017. Cycle # 32 FOLFIRI + Avastin was on 10/17/2017. CEA 6.0 on 10/17/2017. Cycle # 33 FOLFIRI + Avastin was on 10/31/2017. CEA 6.8 on 10/31/2017. Cycle # 34 FOLFIRI + Avastin was on 11/14/2017. CEA 7.2 on 11/14/2017. Cycle # 35 FOLFIRI + Avastin was on 11/28/2017. CEA 5.1 on 11/28/2017. Cycle # 36 FOLFIRI + Avastin was on 12/12/2017. CEA 6.1 on 12/12/2017. Bone scan 12/22/2017 noted no evidence of metastatic disease to the axial or appendicular skeleton. CT chest 12/22/2017 noted no evidence of metastatic disease. CT abdomen/pelvis 12/22/2017 noted stable hypodense lesions in the liver. Continue FOLFIRI + Avastin and repeat scans in 3 months. Cycle # 37 FOLFIRI + Avastin was on 12/27/2018. CEA 6.7 on 12/27/2017. Cycle # 38 FOLFIRI + Avastin was on 01/09/2018. CEA 5.0 on 01/09/2018. Cycle # 39 FOLFIRI + Avastin was on 01/23/2018. CEA 6.5 on 01/23/2018. Cycle # 40 FOLFIRI + Avastin was on 02/06/2018. CEA 6.9 on 02/06/2018. Cycle # 41 FOLFIRI + Avastin was on 02/20/2018. CEA 6.4 on 02/20/2018. Cycle # 42 FOLFIRI + Avastin was on 03/06/2018. CEA 6.6 on 03/06/2018. Cycle # 43 FOLFIRI + Avastin was on 03/20/2018. CEA 5.7 on 03/20/2018. Bone scan on 03/26/2018 negative for metastatic disease. CT chest 03/26/2018 noted ? new 7 mm nodule in LUCY. CT abdomen/pelvis 03/26/2018 noted stable hypodense lesions in the liver. ? New small ascites in the abdomen.   Patient wants to continue to monitor the lung finding and repeat scans in 2 months instead of changing the current chemotherapy regimen to oral Lonsurf. Cycle # 44 FOLFIRI + Avastin was on 04/03/2018. CEA 6.3 on 04/03/2018. Cycle # 45 FOLFIRI + Avastin was on 04/24/2018. CEA 5.3 on 04/24/2018. Cycle # 46 FOLFIRI + Avastin was on 05/08/2018. CEA 5.4 on 05/08/2018. Cycle # 47 FOLFIRI + Avastin was on 05/22/2018. CEA 6.1 on 05/22/2018. CT chest 05/31/2018 noted stable nodule in LUCY. No evidence of worsening malignancy. CT abdomen/pelvis on 05/31/2018 noted stable liver metastasis. No evidence of worsening malignancy. Continue FOLFIRI + Avastin and repeat scans in 3 months. Cycle # 48 FOLFIRI + Avastin was on 06/06/2018. CEA 6.3 on 06/05/2018. Cycle # 49 FOLFIRI + Avastin was on 06/19/2018. CEA 6.1 on 06/19/2018. Cycle # 50 FOLFIRI + Avastin was on 07/03/2018. CEA 7.4 on 07/03/2018. Cycle # 51 FOLFIRI + Avastin was on 07/24/2018. CEA 6.7 on 07/24/2018. Cycle # 52 FOLFIRI + Avastin was on 08/14/2018. CEA 6.8 on 08/14/2018. Cycle # 53 FOLFIRI + Avastin was on 08/28/2018. CEA 6.5 on 08/27/2018. CT chest 09/06/2018 noted interval decrease in size of LUCY measuring up to 4 mm, suggestive of treatment response. No mediastinal or hilar LN  CT abdomen/pelvis 09/06/2018 Slight interval increase in size of a previously noted metastatic lesion within the right hepatic lobe. This may be related to differences in phase of enhancement compared to the most recent study from 5/31/2018, the lesion remains decreased in size compared to the more previous studies. No abdominal, retroperitoneal, or pelvic lymphadenopathy. Continue FOLFIRI + Avastin and repeat scans in 2 months. Cycle # 54 FOLFIRI + Avastin was on 09/11/2018. CEA 6.4 on 09/10/2018. Cycle # 55 FOLFIRI + Avastin was on 09/25/2018. CEA 6.4 on 09/25/2018. Cycle # 56 FOLFIRI + Avastin was on 10/09/2018. CEA 6.7 on 10/08/2018. Cycle # 57 FOLFIRI + Avastin was on 10/23/2018. CEA 7.2 on 10/22/2018.   CT chest 11/02/2018 stable 3 mm nodule within LUCY. No new right and left lung nodule. No mediastinal or osseous lesion. CT abdomen/pelvis 11/02/2018 stable metastatic disease to liver. No new metastatic disease identified. No mesenteric or retroperitoneal LN. No gross colonic lesion identified. Continue FOLFIRI + Avastin and repeat scans in 3 months. Cycle # 58 FOLFIRI + Avastin was on 11/06/2018. CEA 7.6 on 11/05/2018. Cycle # 59 FOLFIRI + Avastin was on 11/27/2018. CEA 6.9 on 11/26/2018. Cycle # 60 FOLFIRI + Avastin was on 12/11/2018. CEA 6.7 on 12/11/2018. Cycle # 61 FOLFIRI + Avastin was on 01/08/2019. CEA 5.6 on 01/07/2019. Cycle # 62 FOLFIRI + Avastin was on 01/29/2019. CEA 4.6 on 01/28/2019. Cycle # 63 FOLFIRI + Avastin was on 02/12/2019. CEA 4.3 on 02/11/2019. CT chest 02/21/2019 no evidence of metastatic disease. CT abdomen/pelvis 02/21/2019 stable hypodense liver lesions. No evidence of worsening metastatic disease. 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 Ordered MRI thoracic spine and referred to neurosurgery team.  Cycle # 64 FOLFIRI + Avastin was on 02/26/2019. CEA 4.7 on 02/25/2019. Cycle # 65 FOLFIRI + Avastin was on 03/12/2019. CEA 4.0 on 03/11/2019. Cycle # 66 FOLFIRI + Avastin was on 03/26/2019. CEA 4.7 on 03/25/2019. Cycle # 67 FOLFIRI + Avastin was on 04/09/2019. CEA 5.6 on 04/08/2019. Cycle # 68 FOLFIRI + Avastin was on 04/30/2019. CEA 4.9 on 04/29/2019. Cycle # 69 FOLFIRI + Avastin was on 05/14/2019. CEA 5.3 on 05/14/2019. CT chest 05/23/2019 stable small lung nodule with no evidence of metastatic disease. CT abdomen/pelvis 05/23/2019 Decrease conspicuity of the liver lesions. No new lesions seen. Stable splenomegaly. Continue FOLFIRI + Avastin and repeat scans in 3 months. Cycle # 70 FOLFIRI + Avastin was on 06/04/2019. CEA 4.8 on 06/03/2019. Cycle # 71 FOLFIRI + Avastin was on 06/18/2019. CEA 4.6 on 06/17/2019.   Cycle # 72 FOLFIRI + Avastin was on 07/09/2019. CEA 4.3 on 07/08/2019. Cycle # 73 FOLFIRI + Avastin was on 07/30/2019. CEA 5.0 on 07/29/2019. Cycle # 74 FOLFIRI + Avastin was on 08/13/2019. CEA 5.0 on 08/12/2019. CT chest 08/20/2019 negative  CT abdomen/pelvis 08/20/2019 Previous identified hepatic lesions are not visualized on the current exam.  Continue FOLFIRI + Avastin and repeat scans in 3 months. Cycle # 75 FOLFIRI + Avastin was on 08/27/2019. CEA 5.0 on 08/26/2019. Cycle # 76 FOLFIRI + Avastin was on 09/17/2019. CEA 5.5 on 09/16/2019. Cycle # 77 FOLFIRI + Avastin was on 10/15/2019. CEA 4.6 on 10/15/2019. Cycle # 78 FOLFIRI + Avastin was on 10/29/2019. CEA 4.1 on 10/28/2019. Cycle # 79 FOLFIRI + Avastin was on 11/12/2019. CEA 4.3 on 11/12/2019. Cycle # 80 FOLFIRI + Avastin was on 12/10/2019. CEA 4.0 on 12/09/2019. CT chest 12/19/2019 Stable 3 mm nodule within the left upper lobe, similar to the previous studies dating back to 11/2/2018, however decreased in size compared to the CT from 3/26/2018. No thoracic LN. CT abdomen/pelvis 12/19/2019 Previously described small hypodense lesions are more conspicuous on the current study compared to the recent study throughout the liver from 8/20/2019, however similar to the prior study from 2/21/2019. Findings may be related to differences in contrast opacification. No abdominal or pelvic lymphadenopathy. Continue FOLFIRI + Avastin and repeat scans in 2 months to f/u on liver lesions. Cycle # 81 FOLFIRI + Avastin was on 01/07/2020. CEA 3.8 on 01/06/2020. Cycle # 82 FOLFIRI + Avastin was on 01/21/2020. CEA 3.7 on 01/20/2020. Cycle # 83 FOLFIRI + Avastin was on 02/04/2020. CEA 4.1 on 02/03/2020. Cycle # 84 FOLFIRI + Avastin was on 02/18/2020. CEA 4.6 on 02/17/2020. CT chest 2/28/2020 no evidence of metastatic disease to the lungs and no mediastinal or hilar adenopathy.    CT abdomen pelvis 2/28/2020 no enhancing lesions seen within the liver to suggest metastatic disease. Wall thickening of the rectosigmoid junction. Images reviewed. Continue FOLFIRI Avastin and repeat scans in 3 months  EGD by Dr. Giuseppe Alatorre 03/02/2020 showing no masses or lesions. Cycle # 85 FOLFIRI + Avastin was on 03/03/2020. CEA 7.4 on 03/02/2020. Cycle # 86 FOLFIRI + Avastin was on 03/17/2020. CEA 4.5 on 03/16/2020. Colonoscopy on 03/23/2020 by Dr. Giuseppe Alatorre unremarkable (records requested). Cycle # 87 FOLFIRI + Avastin was on 03/31/2020. CEA 4.1 on 03/30/2020. Cycle # 88 FOLFIRI + Avastin was on 04/21/2020. CEA 6.4 on 04/20/2020. Cycle # 89 FOLFIRI + Avastin was on 05/05/2020. CEA 5.8 on 05/04/2020. Cycle # 90 FOLFIRI + Avastin was on 06/02/2020. CEA 5.5 on 06/01/2020. Cycle # 91 FOLFIRI + Avastin was on 06/16/2020. CEA 5.6 on 06/15/2020. CT chest 06/23/2020 noted no metastatic disease in the chest.   CT abdomen/pelvis 06/23/2020 Stable small liver lesions measuring up to 5 mm since 2019.   22 mm round mass in segment 8 of the liver with central high density stable from December 2019), previously measuring up to 34 mm in 2017. No additional metastatic disease in the abdomen or pelvis. Small amount of ascites. Overall stable disease. Continue FOLFIRI + Avastin and repeat scans in 2-3 months. Cycle # 92 FOLFIRI + Avastin was on 07/07/2020. CEA 5.7 on 07/06/2020. Cycle # 93 FOLFIRI + Avastin was on 07/21/2020. CEA 5.9 on 07/20/2020. Cycle # 94 FOLFIRI + Avastin was on 08/04/2020. CEA 5.5 on 08/04/2020. Cycle # 95 FOLFIRI + Avastin was on 08/25/2020. CEA 6.1 on 08/24/2020. CT chest 9/2/2020 stable unremarkable enhanced CT of the thorax. There is no metastatic disease to the lungs. CT abdomen pelvis on 9/2/2020 stable 2.2 cm low attenuated lesion within the central aspect of the right hepatic lobe favored to represent treated metastatic disease. No new hepatic lesion is identified. Stable splenomegaly  There is no appreciable colonic lesion or stricture.   Pericholecystic fluid of uncertain etiology. Laparoscopic cholecystectomy with normal cholangiogram 10/27/20  Gallbladder: Chronic cholecystitis and cholelithiasis.  Unremarkable regional lymph node. 11/13/2020; Seen by Dr. Felix Carvajal from General surgery team; cleared to re-start chemotherapy. Cycle # 96 FOLFIRI + Avastin was on 11/17/2020. CEA 3.6 on 11/16/2020. Cycle # 97 FOLFIRI + Avastin was on 12/01/2020. CEA 3.5 on 11/30/2020. Cycle # 98 FOLFIRI + Avastin was on 12/15/2020. CEA 4.3 on 12/15/2020. Cycle # 99 FOLFIRI + Avastin was on 01/05/2021. CEA 4.7 on 01/04/2021. CT chest 01/13/2021 negative for metastatic disease. CT abdomen/pelvis 01/13/2021 Unchanged central hepatic lesion. No specific signs of abdominopelvic metastatic disease. Continue FOLFIRI + Avastin and repeat scans in 3 months. Cycle # 100 FOLFIRI + Avastin was on 01/19/2021. CEA 4.7 on 01/18/2021. Cycle # 101 FOLFIRI + Avastin was on 02/02/2021. CEA 5.2 on 02/01/2021. Cycle # 102 FOLFIRI + Avastin was on 02/16/2021. CEA 5.6 on 02/15/2021. Cycle # 103 FOLFIRI + Avastin was on 03/02/2021. Cycle # 104 FOLFIRI (20% dose reduced due to significant toxicity) + Avastin was on 03/16/2021. Cycle # 105 FOLFIRI (same dose as cycle # 104) + Avastin was on 03/30/2021. CEA 6.5 on 03/29/2021. Cycle # 106 FOLFIRI (same dose as cycle # 104) + Avastin was on 04/13/2021. CEA 6.0 on 04/12/2021  CT chest 04/20/2021 no evidence of metastatic disease. CT abdomen/pelvis 04/20/2021 No evidence of progressive metastatic disease. Stable 2 cm lesion in the right lobe of the liver, likely representing treated metastatic disease. Imaging reviewed. Continue FOLFIRI + Avastin and repeat scans in 3 months  Cycle # 107 FOLFIRI + Avastin was on 04/27/2021  Cycle # 108 FOLFIRI + Avastin was on 05/11/2021. CEA 6.4 on 05/10/2021. Cycle # 109 FOLFIRI (held Avastin due to upcoming surgery) on 05/25/2021.  CEA 6.3 on 5/24/21  Cycle # 110 FOLFIRI (held Avastin due to upcoming surgery) on 06/08/2021. CEA 6.3 on 6/07/21. CEA 5.8 on 06/28/2021. Right inguinal/scrotol hernia repair on 07/13/2021 with Dr Woodfin Claude with folely removal on 07/23/2021. He developed fever on 07/24/2021 and was brought to [address] [place], [place] [address] ER via EMS; Sepsis secondary to urinary tract infection; Completed Abx  CT chest 08/13/2021 no sign of recurrent or metastatic disease. CT abdomen/pelvis 08/13/2021 unchanged hepatic lesions. Splenomegaly and secondary signs of portal venous hypertension  CEA 4.0 on 08/16/2021  CEA 4.1 on 08/30/2021  Cycle # 111 FOLFIRI was on 08/31/2021. HBP team for evaluation of liver lesions; His lesion does appear to be a hemangioma as his prior metastatic deposits in his liver were hypo attenuating compared to this CT which is hyper attenuating. compared to his prior CT scans he has disappearing liver metastasis. MRI liver on 09/17/2021 No suspicious liver lesion identified. Colonoscopy GI Dr. Liz Nava on 09/20/2021 unremarkable  CEA 3.3 on 09/27/2021. CEA 3.2 on 10/25/2021. CT chest 11/08/2021: Similar chronic appearing findings.  No acute process or evidence of metastatic disease identified. CT Abdomen/pelvis 11/08/2021: No significant change in the appearance of the liver. Similar appearance of splenomegaly. No interval change. CEA 2.7 on 11/22/2021. CEA 2.5 on 12/27/2021  RTC 4 weeks with prior labs. Scans after next visit.    MSI testing noted no mismatch repair protein loss of expression    12/28/2021  Jono Huynh MD  Board Certified Medical Oncologist

## 2022-01-07 ENCOUNTER — OFFICE VISIT (OUTPATIENT)
Dept: SURGERY | Age: 72
End: 2022-01-07
Payer: MEDICARE

## 2022-01-07 VITALS
HEIGHT: 73 IN | TEMPERATURE: 97.9 F | DIASTOLIC BLOOD PRESSURE: 64 MMHG | SYSTOLIC BLOOD PRESSURE: 119 MMHG | HEART RATE: 57 BPM | BODY MASS INDEX: 27.17 KG/M2 | WEIGHT: 205 LBS

## 2022-01-07 DIAGNOSIS — C20 RECTAL CANCER METASTASIZED TO LIVER (HCC): Primary | ICD-10-CM

## 2022-01-07 DIAGNOSIS — C78.7 RECTAL CANCER METASTASIZED TO LIVER (HCC): Primary | ICD-10-CM

## 2022-01-07 PROCEDURE — 99213 OFFICE O/P EST LOW 20 MIN: CPT | Performed by: SURGERY

## 2022-01-07 NOTE — PROGRESS NOTES
Belkis Moraes  1/7/2022  One [facility] office visit    Belkis Moraes is a 66 y.o. male post open right inguinal hernia repair with mesh 7/12/21, developed a urinary tract infection and was readmitted 7/26/21, treated with catheter and antibiotics and improved. Finished with chemotherapy for the metastatic rectal cancer. Weight: 205 lb (93 kg). Past Medical History:   Diagnosis Date    Arthritis     Cancer ([address]     colon    Depression     History of blood transfusion     [facility] [place]    Hyperlipidemia     Hypertension     Prolonged emergence from general anesthesia     Retention of urine      Past Surgical History:   Procedure Laterality Date    CHOLECYSTECTOMY, LAPAROSCOPIC N/A 10/27/2020    CHOLECYSTECTOMY LAPAROSCOPIC WITH IOC, ROBOT XI ASSISTED, POSS OPEN performed by Candice Wolf MD at [address] [facility]  11/2/15    bx, rectal mass    FRACTURE SURGERY      HERNIA REPAIR Right 7/13/2021    OPEN RIGHT INGUINAL HERNIA REPAIR WITH MESH (CPT 01074) performed by Candice Wolf MD at [facility] ARTHROSCOPY Right     LIVER BIOPSY  11/12/15    SKIN FULL GRAFT      on finger    TONSILLECTOMY Bilateral     TUNNELED CENTRAL VENOUS CATHETER W/ SUBCUTANEOUS PORT Left        Home Medications  Prior to Visit Medications    Medication Sig Taking?  Authorizing Provider   hydrocortisone 2.5 % cream APPLY TO AFFECTED AREA, NOT TO FACE OR GENITAL AREAS, NOT TO EXCEED 4 WKS OF USE Yes Historical Provider, MD   Magic Mouthwash (MIRACLE MOUTHWASH) Swish and swallow 15 mLs 4 times daily as needed for Irritation Equal Amts of Benadryl,Maalox, and Xylocaine Yes RUBEN Grove - CNP   ferrous gluconate (FERGON) 324 (38 Fe) MG tablet Take 324 mg by mouth daily (with breakfast) Yes Historical Provider, MD   traMADol (ULTRAM) 50 MG tablet TAKE 1 TABLET BY MOUTH EVERY 4 TO 6 HOURS AS NEEDED FOR PAIN Yes Historical Provider, MD   tamsulosin (FLOMAX) 0.4 MG capsule Take 2 capsules by mouth daily  Patient taking differently: Take 0.4 mg by mouth 2 times daily  Yes Sylwia Garcia MD   ondansetron (ZOFRAN) 4 MG tablet Take 1 tablet by mouth every 8 hours as needed for Nausea or Vomiting Yes RUBEN Grove CNP   lactulose (CHRONULAC) 10 GM/15ML solution Take 20 g by mouth as needed  Yes Historical Provider, MD   furosemide (LASIX) 20 MG tablet Take 20 mg by mouth daily Indications: pt is taken this med 7 days a week for now  Yes Historical Provider, MD   magnesium oxide (MAG-OX) 400 (240 Mg) MG tablet Take 1 tablet by mouth 2 times daily  Patient taking differently: Take 400 mg by mouth daily  Yes El Zabala MD   potassium chloride (KLOR-CON M) 20 MEQ extended release tablet Take 40 mEq by mouth daily  Yes Historical Provider, MD   loratadine (CLARITIN) 10 MG tablet Take 10 mg by mouth See Admin Instructions Takes the week of chemotherapy (Last dose: 5/9; Next dose: 5/26) Yes Historical Provider, MD   pantoprazole (PROTONIX) 40 MG tablet Take 40 mg by mouth daily Yes Historical Provider, MD   b complex vitamins capsule Take 1 capsule by mouth daily Yes Historical Provider, MD   diphenoxylate-atropine (LOMOTIL) 2.5-0.025 MG per tablet Take 1 tablet by mouth 4 times daily as needed for Diarrhea. Yes Historical Provider, MD   polyethylene glycol (GLYCOLAX) packet Take 17 g by mouth daily as needed for Constipation Yes Historical Provider, MD   hydrochlorothiazide (HYDRODIURIL) 25 MG tablet Take 1 tablet by mouth daily Yes RUBEN Garber CNP   hydrocortisone (ANUSOL-HC) 2.5 % rectal cream Use 3-4 times daily. Do not use internally. External use only.  Yes RUBEN Pamler CNP   acetaminophen (TYLENOL) 500 MG tablet Take 500 mg by mouth every 6 hours as needed for Pain Yes Historical Provider, MD       Allergies: Neosporin [neomycin-polymyxin-gramicidin] and Tape [adhesive tape]     Physical Exam:   VITALS: Blood pressure 119/64, pulse 57, temperature 97.9 °F (36.6 °C), temperature source Temporal, height 6' 1\" (1.854 m), weight 205 lb (93 kg). General appearance: alert, appears stated age and cooperative, does ambulate easily  Head: Normocephalic, without obvious abnormality, atraumatic  Eyes: PERRL  Ears/mouth/throat:  Ears clear, mouth normal, throat no redness  Neck: no adenopathy, no JVD, supple, symmetrical, trachea midline and thyroid not enlarged  Lungs: clear to auscultation bilaterally  Heart: regular rate and rhythm  Abdomen: incision healed, no skin bruising, no pain, hernia well repaired, still has a small left inguinal hernia  Extremities: 1 + leg edema  Skin: no open wounds    Assessment:    Doing well from the open right sided hernia repair, still has a small left inguinal hernia which does not hurt but does swell when he strains. Done receiving therapy for the metastatic rectal cancer. Plan:   Follow up as needed.     Physician Signature: Electronically signed by Dr. Licha Grace MD

## 2022-01-24 ENCOUNTER — HOSPITAL ENCOUNTER (OUTPATIENT)
Age: 72
Discharge: HOME | End: 2022-01-24
Payer: MEDICARE

## 2022-01-24 DIAGNOSIS — C78.7 RECTAL CANCER METASTASIZED TO LIVER (HCC): ICD-10-CM

## 2022-01-24 DIAGNOSIS — C20 RECTAL CANCER METASTASIZED TO LIVER (HCC): ICD-10-CM

## 2022-01-24 LAB
ALBUMIN SERPL-MCNC: 4.1 G/DL (ref 3.5–5.2)
ALP BLD-CCNC: 138 U/L (ref 40–129)
ALT SERPL-CCNC: 12 U/L (ref 0–40)
ANION GAP SERPL CALCULATED.3IONS-SCNC: 10 MMOL/L (ref 7–16)
AST SERPL-CCNC: 22 U/L (ref 0–39)
BASOPHILS ABSOLUTE: 0.02 E9/L (ref 0–0.2)
BASOPHILS RELATIVE PERCENT: 0.4 % (ref 0–2)
BILIRUB SERPL-MCNC: 0.8 MG/DL (ref 0–1.2)
BUN BLDV-MCNC: 28 MG/DL (ref 6–23)
CALCIUM SERPL-MCNC: 9.9 MG/DL (ref 8.6–10.2)
CEA: 2.4 NG/ML (ref 0–5.2)
CHLORIDE BLD-SCNC: 103 MMOL/L (ref 98–107)
CO2: 25 MMOL/L (ref 22–29)
CREAT SERPL-MCNC: 1.4 MG/DL (ref 0.7–1.2)
EOSINOPHILS ABSOLUTE: 0.15 E9/L (ref 0.05–0.5)
EOSINOPHILS RELATIVE PERCENT: 2.9 % (ref 0–6)
GFR AFRICAN AMERICAN: >60
GFR NON-AFRICAN AMERICAN: 50 ML/MIN/1.73
GLUCOSE BLD-MCNC: 124 MG/DL (ref 74–99)
HCT VFR BLD CALC: 31.5 % (ref 37–54)
HEMOGLOBIN: 10.5 G/DL (ref 12.5–16.5)
IMMATURE GRANULOCYTES #: 0.02 E9/L
IMMATURE GRANULOCYTES %: 0.4 % (ref 0–5)
LYMPHOCYTES ABSOLUTE: 0.64 E9/L (ref 1.5–4)
LYMPHOCYTES RELATIVE PERCENT: 12.5 % (ref 20–42)
MCH RBC QN AUTO: 30.6 PG (ref 26–35)
MCHC RBC AUTO-ENTMCNC: 33.3 % (ref 32–34.5)
MCV RBC AUTO: 91.8 FL (ref 80–99.9)
MONOCYTES ABSOLUTE: 0.33 E9/L (ref 0.1–0.95)
MONOCYTES RELATIVE PERCENT: 6.5 % (ref 2–12)
NEUTROPHILS ABSOLUTE: 3.94 E9/L (ref 1.8–7.3)
NEUTROPHILS RELATIVE PERCENT: 77.3 % (ref 43–80)
PDW BLD-RTO: 13.9 FL (ref 11.5–15)
PLATELET # BLD: 132 E9/L (ref 130–450)
PMV BLD AUTO: 9.6 FL (ref 7–12)
POTASSIUM SERPL-SCNC: 3.9 MMOL/L (ref 3.5–5)
RBC # BLD: 3.43 E12/L (ref 3.8–5.8)
SODIUM BLD-SCNC: 138 MMOL/L (ref 132–146)
TOTAL PROTEIN: 7.9 G/DL (ref 6.4–8.3)
WBC # BLD: 5.1 E9/L (ref 4.5–11.5)

## 2022-01-24 PROCEDURE — 36415 COLL VENOUS BLD VENIPUNCTURE: CPT

## 2022-01-24 PROCEDURE — 82378 CARCINOEMBRYONIC ANTIGEN: CPT

## 2022-01-24 PROCEDURE — 85025 COMPLETE CBC W/AUTO DIFF WBC: CPT

## 2022-01-24 PROCEDURE — 80053 COMPREHEN METABOLIC PANEL: CPT

## 2022-01-25 ENCOUNTER — HOSPITAL ENCOUNTER (OUTPATIENT)
Dept: INFUSION THERAPY | Age: 72
Discharge: HOME | End: 2022-01-25
Payer: MEDICARE

## 2022-01-25 ENCOUNTER — OFFICE VISIT (OUTPATIENT)
Dept: ONCOLOGY | Age: 72
End: 2022-01-25
Payer: MEDICARE

## 2022-01-25 VITALS
SYSTOLIC BLOOD PRESSURE: 133 MMHG | TEMPERATURE: 98.6 F | OXYGEN SATURATION: 98 % | BODY MASS INDEX: 27.35 KG/M2 | WEIGHT: 206.4 LBS | HEART RATE: 63 BPM | DIASTOLIC BLOOD PRESSURE: 71 MMHG | HEIGHT: 73 IN

## 2022-01-25 DIAGNOSIS — C78.7 RECTAL CANCER METASTASIZED TO LIVER (HCC): Primary | ICD-10-CM

## 2022-01-25 DIAGNOSIS — C20 RECTAL CANCER (HCC): Primary | ICD-10-CM

## 2022-01-25 DIAGNOSIS — C20 RECTAL CANCER METASTASIZED TO LIVER (HCC): Primary | ICD-10-CM

## 2022-01-25 PROCEDURE — 2580000003 HC RX 258: Performed by: INTERNAL MEDICINE

## 2022-01-25 PROCEDURE — 6360000002 HC RX W HCPCS: Performed by: INTERNAL MEDICINE

## 2022-01-25 PROCEDURE — 99214 OFFICE O/P EST MOD 30 MIN: CPT

## 2022-01-25 PROCEDURE — 96523 IRRIG DRUG DELIVERY DEVICE: CPT

## 2022-01-25 PROCEDURE — 99214 OFFICE O/P EST MOD 30 MIN: CPT | Performed by: INTERNAL MEDICINE

## 2022-01-25 RX ORDER — SODIUM CHLORIDE 0.9 % (FLUSH) 0.9 %
10 SYRINGE (ML) INJECTION PRN
Status: DISCONTINUED | OUTPATIENT
Start: 2022-01-25 | End: 2022-01-26 | Stop reason: HOSPADM

## 2022-01-25 RX ORDER — HEPARIN SODIUM (PORCINE) LOCK FLUSH IV SOLN 100 UNIT/ML 100 UNIT/ML
500 SOLUTION INTRAVENOUS PRN
Status: DISCONTINUED | OUTPATIENT
Start: 2022-01-25 | End: 2022-01-26 | Stop reason: HOSPADM

## 2022-01-25 RX ORDER — SODIUM CHLORIDE 0.9 % (FLUSH) 0.9 %
10 SYRINGE (ML) INJECTION PRN
Status: CANCELLED | OUTPATIENT
Start: 2022-01-25

## 2022-01-25 RX ORDER — HEPARIN SODIUM (PORCINE) LOCK FLUSH IV SOLN 100 UNIT/ML 100 UNIT/ML
500 SOLUTION INTRAVENOUS PRN
Status: CANCELLED | OUTPATIENT
Start: 2022-01-25

## 2022-01-25 RX ADMIN — HEPARIN 500 UNITS: 100 SYRINGE at 08:25

## 2022-01-25 RX ADMIN — Medication 20 ML: at 08:25

## 2022-01-25 NOTE — PROGRESS NOTES
[place] [id number]           Attending Clinic Note     Reason for Visit: Follow-up on a patient with Metastatic Rectal Cancer.     PCP: Mildred Flores MD     History of Present Illness:  67 y/o  male who was referred to see Dr. Shira Morris (GI team) for evaluation of bright red blood per rectum, mild anemia and change in bowel habits with diarrhea. CEA 2640 on 10/19/2015. AlcP 299 AST 57 ALT 75 on 10/19/2015. Colonoscopy in 2013 noted no significant polyps, colitis or lesions at that time. Denies any Family History of colorectal cancer or polyps.     Colonoscopy on 10/19/2015 revealed:  1. Ascending polyp, 8 mm, hot snare: Tubulovillous adenoma. 2. Transverse polyp, 1 cm, hot snare: Serrated polyp most consistent with sessile serrated adenoma. 3. Five splenic flexure polyps, three - 5 mm, 7 mm, 8 mm, hot snare and biopsy: Four segments of Tubular Adenoma  4. Descending polyp, 5 mm, biopsy: Tubular adenoma. 5. Sigmoid polyp, 4 mm biopsy, Serrated polyp most consistent with sessile serrated adenoma. 6. Two rectal polyps, 4 mm biopsy: Serrated polyp most consistent with hyperplastic polyp. 7. Rectosigmoid colon mass (large mass approximately 65% circumference of the lumen; Very friable, firm and hard): Tubulovillous adenoma with associated focal erosion and fibroplasia.      CT scan abdomen/pelvis on 10/26/2015:  1. Small nodules at lung bases likely represent metastatic colon   cancer. 2. Extensive likely metastatic colon cancer throughout the liver.      3. Mild mural thickening and luminal narrowing in the   terminal ileum, otherwise nonspecific.      Colonoscopy with snare removal rectal mass was performed by Dr. Claire Gillis. Pathology proved:  Rectal polyp: Invasive adenocarcinoma involving villous adenoma and extending to the cauterized edge of excision. KRAS Mutation: Mutation detected.   BRAF Mutation: Mutation not detected. 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     CEA 3488. Bone scan on 11/10/2015 noted no metastatic disease. CT chest on 11/10/2015 revealed Multiple pulmonary nodules in the upper and lower lobes consistent with metastatic disease;   Hepatic metastasis also visualized. 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 Mediport was placed. Cycle # 1 of FOLFOX + Avastin was on 11/23/2015. CEA was 4555 on 11/23/2015. Cycle # 2 of FOLFOX + Avastin was on 12/08/2015. CEA was 3160 on 12/08/2015. Cycle # 3 of FOLFOX + Avastin was on 12/22/2015. CEA was 2516 on 12/21/2015. Cycle # 4 of FOLFOX + Avastin was on 01/05/2016. CEA was 2098 on 01/05/2015. Cycle # 5 of FOLFOX + Avastin was on 01/19/2016. CEA was 1511 on 01/05/2015.     -Bone scan on 01/26/2016 noted no metastatic disease. CT chest on 01/26/2016 revealed Significant response to treatment with no visible residual nodules. CT scan abdomen/pelvis on 01/26/2016 noted Interval decreased size of multiple masses in the liver compatible with treatment response. Continue another 2 months of FOLFOX + Avastin and repeat scans. Cycle # 6 of FOLFOX + Avastin was on 02/02/2016.  on 02/02/2016. Cycle # 7 of FOLFOX + Avastin was on 02/16/2016.  on 02/16/2016. Cycle # 8 of FOLFOX + Avastin was on 03/01/2016.  on 03/01/2016. Cycle # 9 of FOLFOX + Avastin was on 03/15/2016.  on 03/15/2016. Cycle # 10 of FOLFOX + Avastin was on 03/29/2016. .4 on 03/29/2016. Cycle # 11 of FOLFOX + Avastin was on 04/12/2016. .4 on 04/12/2016.     Re-staging scans on 04/19/2016: CT Chest: clear lungs; no evidence of recurring pulmonary nodule; CT Abdomen/Pelvis: Further interval decrease in size of the multiple metastatic hepatic lesions, the largest lesion now measures 3.9 x 3.5 cm and previously measured 4.5 cm in maximum diameter.  No mesenteric lymphadenopathy is identified; Bone Scan: No evidence of osseous metastasis. Continue same regimen and re-stage in 2-3 months. Cycle # 12 FOLFOX + Avastin was on 04/26/2016. .5 on 04/26/2016. Cycle # 13 FOLFOX + Avastin was on 05/10/2016. .3 on 05/10/2016. Cycle # 14 FOLFOX+AVASTIN was on 05/24/2016. .5 on 05/24/2016. Cycle # 15 FOLFOX + Avastin was on 06/07/2016. CEA 90.5 on 06/07/2016. Admitted to [facility] 06/13/2016-06/16/2016 for abdominal pain: EGD noted 1.5 cm clean based duodenal bulb ulceration s/p epinephrine and bicap per Dr. Rehan Bailey. No active bleeding. A. Stomach, biopsy: Mild chronic gastritis, immunostain negative for Helicobacter  B. Esophagus, biopsy: Gastric glandular mucosa with prominent intestinal metaplasia (Mad[place]'s epithelium), negative for epithelial dysplasia, esophageal squamous mucosa not identified  Cycle # 16 FOLFOX (discontinued avastin (bevacizumab) given association of peptic ulcer disease and known association of GI perforation) was on 06/21/2016. Cycle # 17 FOLFOX was on 07/05/2016. CEA 52.6 on 07/19/2016. Cycle # 17 FOLFOX was on 07/05/2016. CEA 52.6 on 07/05/2016. CEA 47.6 on 07/19/2016.     Re-staging scans 07/19/2106: CT Chest negative for metastatic disease. CT Abdomen/Pelvis: Stable hepatic lesions; Question new mural thickening in the cecum. Bone Scan: New Let hip lesion suspicious for bone metastasis.     Increased CEA; new mural thickening in the cecum and new left hip lesion suspicious for bone metastasis are consistent with disease progression; He derived maximum benefit from FOLFOX/AVASTIN. D/C FOLFOX/AVASTIN. We recommended FOLFIRI second line therapy. Cycle # 1 FOLFIRI was on 08/02/2016. CEA 40.8 on 08/02/2016. Xgeva q4 weeks started on 08/02/2016. Cycle # 2 FOLFIRI was on 08/16/2016. CEA 31.7 on 08/16/2016. Cycle # 3 FOLFIRI (added Avastin) was on 08/30/2016 given that ulcers healed on EGD 08/15/2016 by Dr. Merline Mani; Protonix bid since avastin re-started.    Colonoscopy on 08/29/2016 (to look at the cecal area) unremarkable. CEA 26.7 on 08/30/2016. Cycle # 4 FOLFIRI/Avastin was on 09/13/2016. CEA 23.4 on 09/13/2016. Cycle # 5 FOLFIRI/Avastin was on 09/27/2016. CEA 22.3 on 09/27/2016. Cycle # 6 FOLFIRI/Avastin was on 10/11/2016. CEA 18.4 on 10/11/2016.      Bone scan 10/21/2016 stable bone metastasis; ? New lesion anterior left sixth rib likely interval healing fracture. CT abdomen/pelvis revealed stable hepatic metastasis. CT chest negative for metastatic disease. Continue FOLFIRI/Avastin and repeat scans in 3 months. Cycle # 7 FOLFIRI/Avastin was on 10/25/2016. CEA 16.1  Cycle # 8 FOLFIRI/Avastin was on 11/08/2016. CEA 15.7  Cycle # 9 FOLFIRI/Avastin was on 11/29/2016. CEA 13.6 on 11/29/2016. Cycle # 10 FOLFIRI/Avastin was on 12/13/2016. CEA 10.6 on 12/13/2016. Cycle # 11 FOLFIRI/Avastin was on 12/27/2016. CEA 11.1 on 12/27/2016. Cycle # 12 FOLFIRI/Avastin was on 01/10/2017. CEA 9.7 on 01/10/2017.       CT chest 01/23/2017 No new pulmonary nodule or lymphadenopathy. Patchy groundglass opacities within the left lower lobe, suggestive of infectious or inflammatory etiology. Followup CT chest in 3 months. Slight increased linear sclerosis along the left anterior sixth rib corresponding to an area of increased uptake on the prior bone scan from 10/21/2016, favored to be related to interval healing changes of a nondisplaced fracture. CT abdomen/pelvis on 01/23/2017 Redemonstration of multiple hepatic metastases, which are similar compared to the prior CT from 10/21/2016. Redemonstration of area of increased sclerosis along the right acetabulum suspicious for metastatic disease. Bone scan on 01/23/2017 Stable subtle uptake within the left proximal diaphysis, again suggestive of metastatic disease  Decreased subtle uptake within the medial right acetabulum.    Decreased uptake within the left anterior sixth rib corresponding to linear sclerosis on the recent CT, favored to be related to an joint rib fracture. Continue FOLFIRI + Avastin and repeat scans in 3 months. Cycle # 13 FOLFIRI + Avastin was on 01/24/2017. Cycle # 14 FOLFIRI + Avastin was on 02/07/2017. Cycle # 15 FOLFIRI + Avastin was on 02/21/2017. Cycle # 16 FOLFIRI + Avastin was on 03/07/2017. Cycle # 17 FOLFIRI + Avastin was on 03/21/2017. Cycle # 18 FOLFIRI + Avastin was on 04/04/2017. CT chest 04/17/2017 negative for metastatic disease. CT abdomen/pelvis 04/17/2017 Stable hypodense metastatic lesions within the liver. Stable area of increased sclerosis along the right acetabulum  Bone scan 04/17/2017 Stable subtle uptake within the left proximal femoral diaphysis; No definite abnormal uptake in the region of a sclerotic lesion along the posterior column of the right acetabulum noted on CT. Stable slight uptake within the left anterior sixth rib corresponding to linear sclerosis on the recent CT, favored to be related to a fracture. Continue FOLFIRI + Avastin and repeat scans in 3 months. Cycle # 19 FOLFIRI + Avastin was on 04/18/2017. Cycle # 20 FOLFIRI + Avastin was on 05/02/2017. Cycle # 21 FOLFIRI + Avastin was on 05/16/2017. Cycle # 22 FOLFIRI + Avastin was on 05/30/2017. Cycle # 23 FOLFIRI + Avastin was on 06/13/2017. Cycle # 24 FOLFIRI + Avastin was on 06/27/2017. Cycle # 25 FOLFIRI + Avastin was on 07/11/2017. Cycle # 26 FOLFIRI + Avastin was on 07/25/2017. Cycle # 27 FOLFIRI + Avastin was on 08/08/2017. Cycle # 28 FOLFIRI + Avastin was on 08/22/2017. Cycle # 29 FOLFIRI + Avastin was on 09/05/2017. Cycle # 30 FOLFIRI + Avastin was on 09/19/2017. Bone scan 09/26/2017 stable. CT abdomen/pelvis on 09/26/2017 Stable hypodense mass in the liver. Stable sclerotic lesion in the acetabulum. CT chest 09/26/2017 negative for metastatic disease. Cycle # 31 FOLFIRI + Avastin was on 10/03/2017. CEA 7.4 on 10/03/2017. Cycle # 32 FOLFIRI + Avastin was on 10/17/2017.  CEA 6.0 on 10/17/2017. Cycle # 33 FOLFIRI + Avastin was on 10/31/2017. CEA 6.8 on 10/31/2017. Cycle # 34 FOLFIRI + Avastin was on 11/14/2017. CEA 7.2 on 11/14/2017. Cycle # 35 FOLFIRI + Avastin was on 11/28/2017. CEA 5.1 on 11/28/2017. Cycle # 36 FOLFIRI + Avastin was on 12/12/2017. CEA 6.1 on 12/12/2017. Bone scan 12/22/2017 noted no evidence of metastatic disease to the axial or appendicular skeleton. CT chest 12/22/2017 noted no evidence of metastatic disease. CT abdomen/pelvis 12/22/2017 noted stable hypodense lesions in the liver. Continue FOLFIRI + Avastin and repeat scans in 3 months. Cycle # 37 FOLFIRI + Avastin was on 12/27/2018. Cycle # 38 FOLFIRI + Avastin was on 01/09/2018. Cycle # 39 FOLFIRI + Avastin was on 01/23/2018. Cycle # 40 FOLFIRI + Avastin was on 02/06/2018. Cycle # 41 FOLFIRI + Avastin was on 02/20/2018. Cycle # 42 FOLFIRI + Avastin was on 03/06/2018. Cycle # 43 FOLFIRI + Avastin was on 03/20/2018. Bone scan on 03/26/2018 negative for metastatic disease. CT chest 03/26/2018 noted ? new 7 mm nodule in LUCY. CT abdomen/pelvis 03/26/2018 noted stable hypodense lesions in the liver. ? New small ascites in the abdomen. Patient wants to continue to monitor the lung finding and repeat scans in 2 months instead of changing the current regimen into oral Lonsurf. Cycle # 44 FOLFIRI + Avastin was on 04/03/2018. CEA 6.3 on 04/03/2018. Cycle # 45 FOLFIRI + Avastin was on 04/24/2018. CEA 5.3 on 04/24/2018. Cycle # 46 FOLFIRI + Avastin was on 05/08/2018. CEA 5.4 on 05/08/2018. Cycle # 47 FOLFIRI + Avastin was on 05/22/2018. CEA 6.1 on 05/22/2018. CT chest 05/31/2018 noted stable nodule in LUCY. No evidence of worsening malignancy. CT abdomen/pelvis on 05/31/2018 noted stable liver metastasis. No evidence of worsening malignancy. Continue FOLFIRI + Avastin and repeat scans in 3 months. Cycle # 48 FOLFIRI + Avastin was on 06/06/2018. Cycle # 49 FOLFIRI + Avastin was on 06/19/2018. decreased in size compared to the CT from 3/26/2018. No thoracic LN. CT abdomen/pelvis 12/19/2019 Previously described small hypodense lesions are more conspicuous on the current study compared to the recent study throughout the liver from 8/20/2019, however similar to the prior study from 2/21/2019. Findings may be related to differences in contrast opacification. No abdominal or pelvic lymphadenopathy. Continue FOLFIRI + Avastin and repeat scans in 2 months to f/u on liver lesions. Cycle # 81 FOLFIRI + Avastin was on 01/07/2020. CEA 3.8 on 01/06/2020. Cycle # 82 FOLFIRI + Avastin was on 01/21/2020. CEA 3.7 on 01/20/2020. Cycle # 83 FOLFIRI + Avastin was on 02/04/2020. CEA 4.1 on 02/03/2020. Cycle # 84 FOLFIRI + Avastin was on 02/18/2020. CEA 4.6 on 02/17/2020. EGD by Dr. Bonni Holter 03/02/2020 showing no masses or lesions  Cycle # 85 FOLFIRI + Avastin was on 03/03/2020. CEA 7.4 on 03/02/2020. Cycle # 86 FOLFIRI + Avastin was on 03/17/2020. CEA 4.5 on 03/16/2020. Colonoscopy on 03/23/2020 by Dr. Bonni Holter unremarkable (records requested). Cycle # 87 FOLFIRI + Avastin was on 03/31/2020. CEA 4.1 on 03/30/2020. Cycle # 88 FOLFIRI + Avastin was on 04/21/2020. CEA 6.4 on 04/20/2020. Cycle # 89 FOLFIRI + Avastin was on 05/05/2020. CEA 5.8 on 05/04/2020. Cycle # 90 FOLFIRI + Avastin was on 06/02/2020. CEA 5.5 on 06/01/2020. Cycle # 91 FOLFIRI + Avastin was on 06/16/2020. CEA 5.6 on 06/15/2020. CT chest 06/23/2020 noted no metastatic disease in the chest.   CT abdomen/pelvis 06/23/2020 Stable small liver lesions measuring up to 5 mm since 2019.   22 mm round mass in segment 8 of the liver with central high density stable from December 2019), previously measuring up to 34 mm in 2017. No additional metastatic disease in the abdomen or pelvis. Small amount of ascites. Overall stable disease. Continue FOLFIRI + Avastin and repeat scans in 2-3 months. Cycle # 92 FOLFIRI + Avastin was on 07/07/2020.  CEA 5.7 on 07/06/2020. Cycle # 93 FOLFIRI + Avastin was on 07/21/2020. CEA 5.9 on 07/20/2020. Cycle # 94 FOLFIRI + Avastin was on 08/04/2020. CEA 5.5 on 08/04/2020. Cycle # 95 FOLFIRI + Avastin was on 08/25/2020. CEA 6.1 on 08/24/2020. CT chest 9/2/2020 stable unremarkable enhanced CT of the thorax. There is no metastatic disease to the lungs. CT abdomen pelvis on 9/2/2020 stable 2.2 cm low attenuated lesion within the central aspect of the right hepatic lobe favored to represent treated metastatic disease. No new hepatic lesion is identified. Stable splenomegaly  There is no appreciable colonic lesion or stricture  Pericholecystic fluid of uncertain etiology. General surgery team consulted. Laparoscopic cholecystectomy with normal cholangiogram 10/27/20  Gallbladder: Chronic cholecystitis and cholelithiasis.  Unremarkable regional lymph node. 11/13/2020; Seen by Dr. Tamar Angelo from General surgery team; cleared to re-start chemotherapy. Cycle # 96 FOLFIRI + Avastin was on 11/17/2020. CEA 3.6 on 11/16/2020. Cycle # 97 FOLFIRI + Avastin was on 12/01/2020. CEA 3.5 on 11/30/2020. Cycle # 98 FOLFIRI + Avastin was on 12/15/2020. CEA 4.3 on 12/15/2020. Cycle # 99 FOLFIRI + Avastin was on 01/05/2021. CEA 4.7 on 01/04/2021. CT chest 01/13/2021 negative for metastatic disease. CT abdomen/pelvis 01/13/2021 Unchanged central hepatic lesion. No specific signs of abdominopelvic metastatic disease. Continue FOLFIRI + Avastin and repeat scans in 3 months. Cycle # 100 FOLFIRI + Avastin was on 01/19/2021. CEA 4.7 on 01/18/2021. Cycle # 101 FOLFIRI + Avastin was on 02/02/2021. CEA 5.2 on 02/01/2021. Cycle # 102 FOLFIRI + Avastin was on 02/16/2021. CEA 5.6 on 02/15/2021. Cycle # 103 FOLFIRI + Avastin was on 03/02/2021. Cycle # 104 FOLFIRI (20% dose reduced due to significant toxicity) + Avastin was on 03/16/2021. Cycle # 105 FOLFIRI (same dose as cycle # 104) + Avastin was on 03/30/2021.  CEA 6.5 on 03/29/2021. Cycle # 106 FOLFIRI (same dose as cycle # 104) + Avastin was on 04/13/2021. CEA 6.0 on 04/12/2021  CT chest 04/20/2021 no evidence of metastatic disease. CT abdomen/pelvis 04/20/2021 No evidence of progressive metastatic disease. Stable 2 cm lesion in the right lobe of the liver, likely representing treated metastatic disease. Imaging reviewed. Continue FOLFIRI + Avastin and repeat scans in 3 months  Cycle # 107 FOLFIRI + Avastin was on 04/27/2021  Cycle # 108 FOLFIRI + Avastin was on 05/11/2021. Cycle # 109 FOLFIRI (held Avastin due to upcoming surgery) on 05/25/2021. CEA 6.3 on 5/24/21  Cycle # 110 FOLFIRI (held Avastin due to upcoming surgery) on 06/08/2021. CEA 6.3 on 6/07/21. Right inguinal/scrotol hernia repair on 07/13/2021 with Dr Martha Barton with folely removal on Friday 07/23/2021. He developed fever on 07/24/2021 and was brought to [address] [place], [place] [address] ER via EMS; Sepsis secondary to urinary tract infection   Completed Abx  CEA 4.1 on 08/30/2021  Cycle # 111 FOLFIRI was on 08/31/2021. HBP team for evaluation of liver lesions; His lesion does appear to be a hemangioma as his prior metastatic deposits in his liver were hypo attenuating compared to this CT which is hyper attenuating. compared to his prior CT scans he has disappearing liver metastasis. MRI liver on 09/17/2021 No suspicious liver lesion identified. Colonoscopy GI Dr. Gallo Bear on 09/20/2021 unremarkable  CEA 3.3 on 09/27/2021. CEA 3.2 on 10/25/2021. CEA 2.7 on 11/22/2021. CEA 2.5 on 12/27/2021. CEA 2.4 on 01/24/2022  Today 01/25/2022 for f/u. No fever, chills. No nausea/vomiting. Fair appetite and energy level. + arthritis neuropathy      Review of Systems;  CONSTITUTIONAL: No fever, chills. Fair appetite and energy level  ENMT: Eyes: No diplopia; Nose: No epistaxis. Mouth:No lesions  RESPIRATORY: No hemoptysis, shortness of breath. CARDIOVASCULAR: No chest pain, palpitations. GASTROINTESTINAL:No N/V, abdominal pain. GENITOURINARY: No dysuria, urinary frequency, hematuria. MSK: + Arthritis neuropathy  NEURO: No syncope, presyncope, headache. Remainder ROS: Negative. Past Medical History:   Past Medical History               Diagnosis Date    Arthritis      Cancer ([address]         colon    Depression      Hyperlipidemia      Hypertension           Medications:  Reviewed and reconciled.     Allergies: Allergies   Allergen Reactions    Neosporin [Neomycin-Polymyxin-Gramicidin] Rash    Tape Jeanella Solomon Tape] Rash      Physical Exam:  /71   Pulse 63   Temp 98.6 °F (37 °C)   Ht 6' 1\" (1.854 m)   Wt 206 lb 6.4 oz (93.6 kg)   SpO2 98%   BMI 27.23 kg/m²   GENERAL: Alert, oriented x 3, not in distress  HEENT: PERRLA, EOMI. NECK: Supple. Without lymphadenopathy. LUNGS: CTA bilaterally, with no wheezing, crackles or rhonchi. CARDIOVASCULAR: Regular rhythm. No murmurs, rubs or gallops. ABDOMEN: Soft. Non-tender, non-distended. EXTREMITIES: Without clubbing, cyanosis  NEUROLOGIC: No focal deficits. ECOG PS 1    Diagnostics:  Lab Results   Component Value Date    WBC 5.1 01/24/2022    HGB 10.5 (L) 01/24/2022    HCT 31.5 (L) 01/24/2022    MCV 91.8 01/24/2022     01/24/2022     Lab Results   Component Value Date     01/24/2022    K 3.9 01/24/2022     01/24/2022    CO2 25 01/24/2022    BUN 28 (H) 01/24/2022    CREATININE 1.4 (H) 01/24/2022    GLUCOSE 124 (H) 01/24/2022    CALCIUM 9.9 01/24/2022    PROT 7.9 01/24/2022    LABALBU 4.1 01/24/2022    BILITOT 0.8 01/24/2022    ALKPHOS 138 (H) 01/24/2022    AST 22 01/24/2022    ALT 12 01/24/2022    LABGLOM 50 01/24/2022    GFRAA >60 01/24/2022     Lab Results   Component Value Date    CEA 2.4 01/24/2022     Impression/Plan:  67 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 KRAS Mutation: Mutation detected. BRAF Mutation: Mutation not detected. 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   CT scan abdomen/pelvis on 10/26/2015 revealed small nodules at the lung bases, extensive liver lesions consistent with metastatic rectosigmoid cancer. CEA 3488 on 10/30/2015. Bone scan on 11/10/2015 noted no metastatic disease. CT chest on 11/10/2015 revealed Multiple pulmonary nodules in the upper and lower lobes consistent with metastatic disease; Hepatic metastasis also visualized. 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 Mediport was placed. Cycle # 1 of FOLFOX + Avastin was on 11/23/2015. CEA was 4555 on 11/23/2015. Cycle # 2 of FOLFOX + Avastin was on 12/08/2015. CEA was 3160 on 12/08/2015. Cycle # 3 of FOLFOX + Avastin was on 12/22/2015. CEA was 2516 on 12/21/2015. Cycle # 4 of FOLFOX + Avastin was on 01/05/2016. CEA was 2098 on 01/05/2015. Cycle # 5 of FOLFOX + Avastin was on 01/19/2016. CEA was 1511 on 01/05/2015.     -Bone scan on 01/26/2016 noted no metastatic disease. CT chest on 01/26/2016 revealed Significant response to treatment with no visible residual nodules. CT scan abdomen/pelvis on 01/26/2016 noted Interval decreased size of multiple masses in the liver compatible with treatment response. Continue another 2 months of FOLFOX + Avastin and repeat scans. Cycle # 6 of FOLFOX + Avastin was on 02/02/2016.  on 02/02/2016. Cycle # 7 of FOLFOX + Avastin was on 02/16/2016.  on 02/16/2016. Cycle # 8 of FOLFOX + Avastin was on 03/01/2016.  on 03/01/2016. Cycle # 9 of FOLFOX + Avastin was on 03/15/2016.  on 03/15/2016. Cycle # 10 of FOLFOX + Avastin was on 03/29/2016. .4 on 03/29/2016. Cycle # 11 of FOLFOX + Avastin was on 04/12/2016.  .4 on 04/12/2016.     Re-staging scans on 04/19/2016: CT Chest: clear lungs; no evidence of recurring pulmonary nodule; CT Abdomen/Pelvis: Further interval decrease in size of the multiple metastatic hepatic lesions, the largest lesion now measures 3.9 x 3.5 cm and previously measured 4.5 cm in maximum diameter. No mesenteric lymphadenopathy is identified; Bone Scan: No evidence of osseous metastasis. Continue same regimen and re-stage in 2-3 months. Cycle # 12 FOLFOX + Avastin was on 04/26/2016. .5 on 04/26/2016. Cycle # 13 FOLFOX + Avastin was on 05/10/2016. .3 on 05/10/2016. Cycle # 14 FOLFOX+AVASTIN was on 05/24/2016. .5 on 05/24/2016. Cycle # 15 FOLFOX + Avastin was on 06/07/2016. CEA 90.5 on 06/07/2016. [facility]/2016-06/16/2016 for abdominal pain: EGD noted 1.5 cm clean based duodenal bulb ulceration s/p epinephrine and bicap per Dr. Valorie Finnegan. No active bleeding. A. Stomach, biopsy: Mild chronic gastritis, immunostain negative for Helicobacter  B. Esophagus, biopsy: Gastric glandular mucosa with prominent ntestinal metaplasia (Mad[place]'s epithelium), negative for epithelial dysplasia, esophageal squamous mucosa not identified     Cycle # 16 FOLFOX (discontinued avastin (bevacizumab) given association of peptic ulcer disease and known association of GI perforation.) was on 06/21/2016. CEA 47 on 06/21/2016. Cycle # 17 FOLFOX was on 07/05/2016. CEA 52.6 on 07/05/2016. CEA 47.6 on 07/19/2016.     Re-staging scans 07/19/2106: CT Chest negative for metastatic disease. CT Abdomen/Pelvis: Stable hepatic lesions; Question new mural thickening in the cecum. Bone Scan: New Let hip lesion suspicious for bone metastasis.     Increased CEA; new mural thickening in the cecum and new left hip lesion suspicious for bone metastasis are consistent with disease progression; He derived maximum benefit from FOLFOX/AVASTIN. D/C FOLFOX/AVASTIN. We recommended FOLFIRI second line therapy. Cycle # 1 FOLFIRI was on 08/02/2016. CEA 40.8 on 08/02/2016. Xgeva q4 weeks started on 08/02/2016. Cycle # 2 FOLFIRI was on 08/16/2016. CEA 31.7 on 08/16/2016.   Cycle # 3 FOLFIRI (added Avastin) was on 08/30/2016 given that ulcers healed on EGD 08/15/2016 by Dr. Noel Martínez; Protonix bid since avastin re-started. Colonoscopy on 08/29/2016 (to look at the cecal area) unremarkable. CEA 26.7 on 08/30/2016. Cycle # 4 FOLFIRI/Avastin was on 09/13/2016. CEA 23.4 on 09/13/2016. Cycle # 5 FOLFIRI/Avastin was on 09/27/2016. CEA 22.3 on 09/27/2016. Cycle # 6 FOLFIRI/Avastin was on 10/11/2016. CEA 18.4 on 10/11/2016.      Bone scan 10/21/2016 stable bone metastasis; ? New lesion anterior left sixth rib likely interval healing fracture. CT abdomen/pelvis revealed stable hepatic metastasis. CT chest negative for metastatic disease. Continue FOLFIRI/Avastin and repeat scans in 3 months. Cycle # 7 FOLFIRI/Avastin was on 10/25/2016. CEA 16.1 on 10/25/2016. Cycle # 8 FOLFIRI/Avastin was on 11/08/2016. CEA 15.7 on 11/08/2016. Cycle # 9 FOLFIRI/Avastin was on 11/29/2016. CEA 13.6 on 11/29/2016. Cycle # 10 FOLFIRI/Avastin was on 12/13/2016. CEA 10.6 on 12/13/2016. Cycle # 11 FOLFIRI/Avastin was on 12/27/2016. CEA 11.1 on 12/27/2016. Cycle # 12 FOLFIRI/Avastin was on 01/10/2017. CEA 9.7 on 01/10/2017. CT chest 01/23/2017 No new pulmonary nodule or lymphadenopathy. Patchy groundglass opacities within the left lower lobe, suggestive of infectious or inflammatory etiology. Followup CT chest in 3 months. Slight increased linear sclerosis along the left anterior sixth rib corresponding to an area of increased uptake on the prior bone scan from 10/21/2016, favored to be related to interval healing changes of a nondisplaced fracture. CT abdomen/pelvis on 01/23/2017 Redemonstration of multiple hepatic metastases, which are similar compared to the prior CT from 10/21/2016. Redemonstration of area of increased sclerosis along the right acetabulum suspicious for metastatic disease. Bone scan on 01/23/2017 Stable subtle uptake within the left proximal diaphysis, again suggestive of metastatic disease  Decreased subtle uptake within the medial right acetabulum.    Decreased uptake within the left anterior sixth rib corresponding to linear sclerosis on the recent CT, favored to be related to an joint rib fracture. Continue FOLFIRI + Avastin and repeat scans in 3 months. Cycle # 13 FOLFIRI + Avastin was on 01/24/2017. Cycle # 14 FOLFIRI + Avastin was on 02/07/2017. Cycle # 15 FOLFIRI + Avastin was on 02/21/2017. Cycle # 16 FOLFIRI + Avastin was on 03/07/2017. Cycle # 17 FOLFIRI + Avastin was on 03/21/2017. Cycle # 18 FOLFIRI + Avastin was on 04/04/2017. CT chest 04/17/2017 negative for metastatic disease. CT abdomen/pelvis 04/17/2017 Stable hypodense metastatic lesions within the liver. Stable area of increased sclerosis along the right acetabulum  Bone scan 04/17/2017 Stable subtle uptake within the left proximal femoral diaphysis; No definite abnormal uptake in the region of a sclerotic lesion along the posterior column of the right acetabulum noted on CT. Stable slight uptake within the left anterior sixth rib corresponding to linear sclerosis on the recent CT, favored to be related to a fracture. Continue FOLFIRI + Avastin and repeat scans in 3 months. Cycle # 19 FOLFIRI + Avastin was on 04/18/2017. CEA 7.5 on 04/18/2017. Cycle # 20 FOLFIRI + Avastin was on 05/02/2017. CEA 7.7 on 05/02/2017. Cycle # 21 FOLFIRI + Avastin was on 05/16/2017. CEA 7.1 on 05/16/2017. Cycle # 22 FOLFIRI + Avastin was on 05/30/2017. CEA 8.2 on 05/30/2017. Cycle # 23 FOLFIRI + Avastin was on 06/13/2017. CEA 7.7 on 06/13/2017. Cycle # 24 FOLFIRI + Avastin was on 06/27/2017. CEA 6.6 on 06/27/2017. Re-staging scans 07/05/2017:  Bone scan stable proximal left femoral diaphysis, right acetabulum, and left anterior sixth rib lesions;  CT abdomen/pelvis stable hypodense metastatic lesions within the liver; CT chest without convincing evidence of metastatic disease. Cycle # 25 FOLFIRI + Avastin was on 07/11/2017. CEA 6.3 on 07/11/2017. Cycle # 26 FOLFIRI + Avastin was on 07/25/2017.   CEA 7.3 on 07/25/2017. Cycle # 27 FOLFIRI + Avastin was on 08/08/2017. CEA 6.5 on 08/08/2017. Cycle # 28 FOLFIRI + Avastin was on 08/22/2017. CEA 5.9 on 08/22/2017. Cycle # 29 FOLFIRI + Avastin was on 09/05/2017. CEA 6.3 on 09/05/2017. Cycle # 30 FOLFIRI + Avastin was on 09/19/2017. CEA 6.3 on 09/19/2017. Bone scan 09/26/2017 stable. CT abdomen/pelvis on 09/26/2017 Stable hypodense mass in the liver. Stable sclerotic lesion in the acetabulum. CT chest 09/26/2017 negative for metastatic disease. Cycle # 31 FOLFIRI + Avastin was on 10/03/2017. CEA 7.4 on 10/03/2017. Cycle # 32 FOLFIRI + Avastin was on 10/17/2017. CEA 6.0 on 10/17/2017. Cycle # 33 FOLFIRI + Avastin was on 10/31/2017. CEA 6.8 on 10/31/2017. Cycle # 34 FOLFIRI + Avastin was on 11/14/2017. CEA 7.2 on 11/14/2017. Cycle # 35 FOLFIRI + Avastin was on 11/28/2017. CEA 5.1 on 11/28/2017. Cycle # 36 FOLFIRI + Avastin was on 12/12/2017. CEA 6.1 on 12/12/2017. Bone scan 12/22/2017 noted no evidence of metastatic disease to the axial or appendicular skeleton. CT chest 12/22/2017 noted no evidence of metastatic disease. CT abdomen/pelvis 12/22/2017 noted stable hypodense lesions in the liver. Continue FOLFIRI + Avastin and repeat scans in 3 months. Cycle # 37 FOLFIRI + Avastin was on 12/27/2018. CEA 6.7 on 12/27/2017. Cycle # 38 FOLFIRI + Avastin was on 01/09/2018. CEA 5.0 on 01/09/2018. Cycle # 39 FOLFIRI + Avastin was on 01/23/2018. CEA 6.5 on 01/23/2018. Cycle # 40 FOLFIRI + Avastin was on 02/06/2018. CEA 6.9 on 02/06/2018. Cycle # 41 FOLFIRI + Avastin was on 02/20/2018. CEA 6.4 on 02/20/2018. Cycle # 42 FOLFIRI + Avastin was on 03/06/2018. CEA 6.6 on 03/06/2018. Cycle # 43 FOLFIRI + Avastin was on 03/20/2018. CEA 5.7 on 03/20/2018. Bone scan on 03/26/2018 negative for metastatic disease. CT chest 03/26/2018 noted ? new 7 mm nodule in LUCY. CT abdomen/pelvis 03/26/2018 noted stable hypodense lesions in the liver.  ? New small ascites in the abdomen. Patient wants to continue to monitor the lung finding and repeat scans in 2 months instead of changing the current chemotherapy regimen to oral Lonsurf. Cycle # 44 FOLFIRI + Avastin was on 04/03/2018. CEA 6.3 on 04/03/2018. Cycle # 45 FOLFIRI + Avastin was on 04/24/2018. CEA 5.3 on 04/24/2018. Cycle # 46 FOLFIRI + Avastin was on 05/08/2018. CEA 5.4 on 05/08/2018. Cycle # 47 FOLFIRI + Avastin was on 05/22/2018. CEA 6.1 on 05/22/2018. CT chest 05/31/2018 noted stable nodule in LUCY. No evidence of worsening malignancy. CT abdomen/pelvis on 05/31/2018 noted stable liver metastasis. No evidence of worsening malignancy. Continue FOLFIRI + Avastin and repeat scans in 3 months. Cycle # 48 FOLFIRI + Avastin was on 06/06/2018. CEA 6.3 on 06/05/2018. Cycle # 49 FOLFIRI + Avastin was on 06/19/2018. CEA 6.1 on 06/19/2018. Cycle # 50 FOLFIRI + Avastin was on 07/03/2018. CEA 7.4 on 07/03/2018. Cycle # 51 FOLFIRI + Avastin was on 07/24/2018. CEA 6.7 on 07/24/2018. Cycle # 52 FOLFIRI + Avastin was on 08/14/2018. CEA 6.8 on 08/14/2018. Cycle # 53 FOLFIRI + Avastin was on 08/28/2018. CEA 6.5 on 08/27/2018. CT chest 09/06/2018 noted interval decrease in size of LUCY measuring up to 4 mm, suggestive of treatment response. No mediastinal or hilar LN  CT abdomen/pelvis 09/06/2018 Slight interval increase in size of a previously noted metastatic lesion within the right hepatic lobe. This may be related to differences in phase of enhancement compared to the most recent study from 5/31/2018, the lesion remains decreased in size compared to the more previous studies. No abdominal, retroperitoneal, or pelvic lymphadenopathy. Continue FOLFIRI + Avastin and repeat scans in 2 months. Cycle # 54 FOLFIRI + Avastin was on 09/11/2018. CEA 6.4 on 09/10/2018. Cycle # 55 FOLFIRI + Avastin was on 09/25/2018. CEA 6.4 on 09/25/2018. Cycle # 56 FOLFIRI + Avastin was on 10/09/2018. CEA 6.7 on 10/08/2018.   Cycle # 62 FOLFIRI + Avastin was on 10/23/2018. CEA 7.2 on 10/22/2018. CT chest 11/02/2018 stable 3 mm nodule within LUCY. No new right and left lung nodule. No mediastinal or osseous lesion. CT abdomen/pelvis 11/02/2018 stable metastatic disease to liver. No new metastatic disease identified. No mesenteric or retroperitoneal LN. No gross colonic lesion identified. Continue FOLFIRI + Avastin and repeat scans in 3 months. Cycle # 58 FOLFIRI + Avastin was on 11/06/2018. CEA 7.6 on 11/05/2018. Cycle # 59 FOLFIRI + Avastin was on 11/27/2018. CEA 6.9 on 11/26/2018. Cycle # 60 FOLFIRI + Avastin was on 12/11/2018. CEA 6.7 on 12/11/2018. Cycle # 61 FOLFIRI + Avastin was on 01/08/2019. CEA 5.6 on 01/07/2019. Cycle # 62 FOLFIRI + Avastin was on 01/29/2019. CEA 4.6 on 01/28/2019. Cycle # 63 FOLFIRI + Avastin was on 02/12/2019. CEA 4.3 on 02/11/2019. CT chest 02/21/2019 no evidence of metastatic disease. CT abdomen/pelvis 02/21/2019 stable hypodense liver lesions. No evidence of worsening metastatic disease. 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 Ordered MRI thoracic spine and referred to neurosurgery team.  Cycle # 64 FOLFIRI + Avastin was on 02/26/2019. CEA 4.7 on 02/25/2019. Cycle # 65 FOLFIRI + Avastin was on 03/12/2019. CEA 4.0 on 03/11/2019. Cycle # 66 FOLFIRI + Avastin was on 03/26/2019. CEA 4.7 on 03/25/2019. Cycle # 67 FOLFIRI + Avastin was on 04/09/2019. CEA 5.6 on 04/08/2019. Cycle # 68 FOLFIRI + Avastin was on 04/30/2019. CEA 4.9 on 04/29/2019. Cycle # 69 FOLFIRI + Avastin was on 05/14/2019. CEA 5.3 on 05/14/2019. CT chest 05/23/2019 stable small lung nodule with no evidence of metastatic disease. CT abdomen/pelvis 05/23/2019 Decrease conspicuity of the liver lesions. No new lesions seen. Stable splenomegaly. Continue FOLFIRI + Avastin and repeat scans in 3 months. Cycle # 70 FOLFIRI + Avastin was on 06/04/2019. CEA 4.8 on 06/03/2019.    Cycle # 71 FOLFIRI + Avastin was on 06/18/2019. CEA 4.6 on 06/17/2019. Cycle # 72 FOLFIRI + Avastin was on 07/09/2019. CEA 4.3 on 07/08/2019. Cycle # 73 FOLFIRI + Avastin was on 07/30/2019. CEA 5.0 on 07/29/2019. Cycle # 74 FOLFIRI + Avastin was on 08/13/2019. CEA 5.0 on 08/12/2019. CT chest 08/20/2019 negative  CT abdomen/pelvis 08/20/2019 Previous identified hepatic lesions are not visualized on the current exam.  Continue FOLFIRI + Avastin and repeat scans in 3 months. Cycle # 75 FOLFIRI + Avastin was on 08/27/2019. CEA 5.0 on 08/26/2019. Cycle # 76 FOLFIRI + Avastin was on 09/17/2019. CEA 5.5 on 09/16/2019. Cycle # 77 FOLFIRI + Avastin was on 10/15/2019. CEA 4.6 on 10/15/2019. Cycle # 78 FOLFIRI + Avastin was on 10/29/2019. CEA 4.1 on 10/28/2019. Cycle # 79 FOLFIRI + Avastin was on 11/12/2019. CEA 4.3 on 11/12/2019. Cycle # 80 FOLFIRI + Avastin was on 12/10/2019. CEA 4.0 on 12/09/2019. CT chest 12/19/2019 Stable 3 mm nodule within the left upper lobe, similar to the previous studies dating back to 11/2/2018, however decreased in size compared to the CT from 3/26/2018. No thoracic LN. CT abdomen/pelvis 12/19/2019 Previously described small hypodense lesions are more conspicuous on the current study compared to the recent study throughout the liver from 8/20/2019, however similar to the prior study from 2/21/2019. Findings may be related to differences in contrast opacification. No abdominal or pelvic lymphadenopathy. Continue FOLFIRI + Avastin and repeat scans in 2 months to f/u on liver lesions. Cycle # 81 FOLFIRI + Avastin was on 01/07/2020. CEA 3.8 on 01/06/2020. Cycle # 82 FOLFIRI + Avastin was on 01/21/2020. CEA 3.7 on 01/20/2020. Cycle # 83 FOLFIRI + Avastin was on 02/04/2020. CEA 4.1 on 02/03/2020. Cycle # 84 FOLFIRI + Avastin was on 02/18/2020. CEA 4.6 on 02/17/2020. CT chest 2/28/2020 no evidence of metastatic disease to the lungs and no mediastinal or hilar adenopathy.    CT abdomen pelvis 2/28/2020 no appreciable colonic lesion or stricture. Pericholecystic fluid of uncertain etiology. Laparoscopic cholecystectomy with normal cholangiogram 10/27/20  Gallbladder: Chronic cholecystitis and cholelithiasis.  Unremarkable regional lymph node. 11/13/2020; Seen by Dr. Gurvinder Marquez from General surgery team; cleared to re-start chemotherapy. Cycle # 96 FOLFIRI + Avastin was on 11/17/2020. CEA 3.6 on 11/16/2020. Cycle # 97 FOLFIRI + Avastin was on 12/01/2020. CEA 3.5 on 11/30/2020. Cycle # 98 FOLFIRI + Avastin was on 12/15/2020. CEA 4.3 on 12/15/2020. Cycle # 99 FOLFIRI + Avastin was on 01/05/2021. CEA 4.7 on 01/04/2021. CT chest 01/13/2021 negative for metastatic disease. CT abdomen/pelvis 01/13/2021 Unchanged central hepatic lesion. No specific signs of abdominopelvic metastatic disease. Continue FOLFIRI + Avastin and repeat scans in 3 months. Cycle # 100 FOLFIRI + Avastin was on 01/19/2021. CEA 4.7 on 01/18/2021. Cycle # 101 FOLFIRI + Avastin was on 02/02/2021. CEA 5.2 on 02/01/2021. Cycle # 102 FOLFIRI + Avastin was on 02/16/2021. CEA 5.6 on 02/15/2021. Cycle # 103 FOLFIRI + Avastin was on 03/02/2021. Cycle # 104 FOLFIRI (20% dose reduced due to significant toxicity) + Avastin was on 03/16/2021. Cycle # 105 FOLFIRI (same dose as cycle # 104) + Avastin was on 03/30/2021. CEA 6.5 on 03/29/2021. Cycle # 106 FOLFIRI (same dose as cycle # 104) + Avastin was on 04/13/2021. CEA 6.0 on 04/12/2021  CT chest 04/20/2021 no evidence of metastatic disease. CT abdomen/pelvis 04/20/2021 No evidence of progressive metastatic disease. Stable 2 cm lesion in the right lobe of the liver, likely representing treated metastatic disease. Imaging reviewed. Continue FOLFIRI + Avastin and repeat scans in 3 months  Cycle # 107 FOLFIRI + Avastin was on 04/27/2021  Cycle # 108 FOLFIRI + Avastin was on 05/11/2021. CEA 6.4 on 05/10/2021. Cycle # 109 FOLFIRI (held Avastin due to upcoming surgery) on 05/25/2021.  CEA 6.3 on 5/24/21  Cycle # 110 FOLFIRI (held Avastin due to upcoming surgery) on 06/08/2021. CEA 6.3 on 6/07/21. CEA 5.8 on 06/28/2021. Right inguinal/scrotol hernia repair on 07/13/2021 with Dr Misty Kat with folely removal on 07/23/2021. He developed fever on 07/24/2021 and was brought to [address] [place], [place] [address] ER via EMS; Sepsis secondary to urinary tract infection; Completed Abx  CT chest 08/13/2021 no sign of recurrent or metastatic disease. CT abdomen/pelvis 08/13/2021 unchanged hepatic lesions. Splenomegaly and secondary signs of portal venous hypertension  CEA 4.0 on 08/16/2021  CEA 4.1 on 08/30/2021  Cycle # 111 FOLFIRI was on 08/31/2021. HBP team for evaluation of liver lesions; His lesion does appear to be a hemangioma as his prior metastatic deposits in his liver were hypo attenuating compared to this CT which is hyper attenuating. compared to his prior CT scans he has disappearing liver metastasis. MRI liver on 09/17/2021 No suspicious liver lesion identified. Colonoscopy GI Dr. Hanna Postal on 09/20/2021 unremarkable  CEA 3.3 on 09/27/2021. CEA 3.2 on 10/25/2021. CT chest 11/08/2021: Similar chronic appearing findings.  No acute process or evidence of metastatic disease identified. CT Abdomen/pelvis 11/08/2021: No significant change in the appearance of the liver. Similar appearance of splenomegaly. No interval change. CEA 2.7 on 11/22/2021. CEA 2.5 on 12/27/2021  CEA 2.4 on 01/24/2022  BUN 28 Creat 1.4;  Increase po fluid intake     RTC 4 weeks with prior scans  MSI testing noted no mismatch repair protein loss of expression    1/25/2022  Georgianna Goodell, MD  Board Certified Medical Oncologist

## 2022-01-25 NOTE — PROGRESS NOTES
Patient presents to clinic for [facility] today. Left chest SQ port accessed per policy using 20 G 5.96 inch Campos needle for good blood return. Site flushed easily with 10 mL NSS followed by 5 mL Heparin solution 100 units/ml rinse prior to de-access. Dry sterile dressing to area. Tolerated procedure well. Encouraged to schedule port flush every 4 weeks.

## 2022-02-04 ENCOUNTER — HOSPITAL ENCOUNTER (OUTPATIENT)
Dept: CT IMAGING | Age: 72
Discharge: HOME | End: 2022-02-04
Payer: MEDICARE

## 2022-02-04 DIAGNOSIS — C20 RECTAL CANCER (HCC): ICD-10-CM

## 2022-02-04 PROCEDURE — 71260 CT THORAX DX C+: CPT

## 2022-02-04 PROCEDURE — 6360000004 HC RX CONTRAST MEDICATION: Performed by: RADIOLOGY

## 2022-02-04 PROCEDURE — 74177 CT ABD & PELVIS W/CONTRAST: CPT

## 2022-02-04 RX ADMIN — IOPAMIDOL 75 ML: 755 INJECTION, SOLUTION INTRAVENOUS at 16:20

## 2022-02-04 RX ADMIN — IOHEXOL 50 ML: 240 INJECTION, SOLUTION INTRATHECAL; INTRAVASCULAR; INTRAVENOUS; ORAL at 16:20

## 2022-02-21 ENCOUNTER — HOSPITAL ENCOUNTER (OUTPATIENT)
Age: 72
Discharge: HOME | End: 2022-02-21
Payer: MEDICARE

## 2022-02-21 DIAGNOSIS — C20 RECTAL CANCER METASTASIZED TO LIVER (HCC): ICD-10-CM

## 2022-02-21 DIAGNOSIS — C78.7 RECTAL CANCER METASTASIZED TO LIVER (HCC): ICD-10-CM

## 2022-02-21 LAB
ALBUMIN SERPL-MCNC: 4 G/DL (ref 3.5–5.2)
ALP BLD-CCNC: 142 U/L (ref 40–129)
ALT SERPL-CCNC: 12 U/L (ref 0–40)
ANION GAP SERPL CALCULATED.3IONS-SCNC: 12 MMOL/L (ref 7–16)
AST SERPL-CCNC: 24 U/L (ref 0–39)
BASOPHILS ABSOLUTE: 0 E9/L (ref 0–0.2)
BASOPHILS RELATIVE PERCENT: 0.6 % (ref 0–2)
BILIRUB SERPL-MCNC: 0.7 MG/DL (ref 0–1.2)
BUN BLDV-MCNC: 26 MG/DL (ref 6–23)
CALCIUM SERPL-MCNC: 10.1 MG/DL (ref 8.6–10.2)
CEA: 2.3 NG/ML (ref 0–5.2)
CHLORIDE BLD-SCNC: 100 MMOL/L (ref 98–107)
CO2: 26 MMOL/L (ref 22–29)
CREAT SERPL-MCNC: 1.5 MG/DL (ref 0.7–1.2)
EOSINOPHILS ABSOLUTE: 0.05 E9/L (ref 0.05–0.5)
EOSINOPHILS RELATIVE PERCENT: 0.9 % (ref 0–6)
GFR AFRICAN AMERICAN: 56
GFR NON-AFRICAN AMERICAN: 46 ML/MIN/1.73
GLUCOSE BLD-MCNC: 102 MG/DL (ref 74–99)
HCT VFR BLD CALC: 31.1 % (ref 37–54)
HEMOGLOBIN: 10.2 G/DL (ref 12.5–16.5)
LYMPHOCYTES ABSOLUTE: 0.4 E9/L (ref 1.5–4)
LYMPHOCYTES RELATIVE PERCENT: 7.9 % (ref 20–42)
MCH RBC QN AUTO: 30.7 PG (ref 26–35)
MCHC RBC AUTO-ENTMCNC: 32.8 % (ref 32–34.5)
MCV RBC AUTO: 93.7 FL (ref 80–99.9)
MONOCYTES ABSOLUTE: 0.1 E9/L (ref 0.1–0.95)
MONOCYTES RELATIVE PERCENT: 1.8 % (ref 2–12)
NEUTROPHILS ABSOLUTE: 4.5 E9/L (ref 1.8–7.3)
NEUTROPHILS RELATIVE PERCENT: 89.5 % (ref 43–80)
PDW BLD-RTO: 14.1 FL (ref 11.5–15)
PLATELET # BLD: 112 E9/L (ref 130–450)
PMV BLD AUTO: 9.7 FL (ref 7–12)
POTASSIUM SERPL-SCNC: 3.7 MMOL/L (ref 3.5–5)
RBC # BLD: 3.32 E12/L (ref 3.8–5.8)
SODIUM BLD-SCNC: 138 MMOL/L (ref 132–146)
TOTAL PROTEIN: 7.5 G/DL (ref 6.4–8.3)
WBC # BLD: 5 E9/L (ref 4.5–11.5)

## 2022-02-21 PROCEDURE — 80053 COMPREHEN METABOLIC PANEL: CPT

## 2022-02-21 PROCEDURE — 82378 CARCINOEMBRYONIC ANTIGEN: CPT

## 2022-02-21 PROCEDURE — 36415 COLL VENOUS BLD VENIPUNCTURE: CPT

## 2022-02-21 PROCEDURE — 85025 COMPLETE CBC W/AUTO DIFF WBC: CPT

## 2022-02-22 ENCOUNTER — HOSPITAL ENCOUNTER (OUTPATIENT)
Dept: INFUSION THERAPY | Age: 72
Discharge: HOME | End: 2022-02-22
Payer: MEDICARE

## 2022-02-22 ENCOUNTER — OFFICE VISIT (OUTPATIENT)
Dept: ONCOLOGY | Age: 72
End: 2022-02-22
Payer: MEDICARE

## 2022-02-22 VITALS
BODY MASS INDEX: 28.34 KG/M2 | SYSTOLIC BLOOD PRESSURE: 147 MMHG | HEIGHT: 73 IN | OXYGEN SATURATION: 97 % | DIASTOLIC BLOOD PRESSURE: 73 MMHG | WEIGHT: 213.8 LBS | HEART RATE: 66 BPM

## 2022-02-22 DIAGNOSIS — C20 RECTAL CANCER (HCC): Primary | ICD-10-CM

## 2022-02-22 DIAGNOSIS — C78.7 RECTAL CANCER METASTASIZED TO LIVER (HCC): Primary | ICD-10-CM

## 2022-02-22 DIAGNOSIS — C20 RECTAL CANCER METASTASIZED TO LIVER (HCC): Primary | ICD-10-CM

## 2022-02-22 PROCEDURE — 2580000003 HC RX 258: Performed by: INTERNAL MEDICINE

## 2022-02-22 PROCEDURE — 6360000002 HC RX W HCPCS: Performed by: INTERNAL MEDICINE

## 2022-02-22 PROCEDURE — 99214 OFFICE O/P EST MOD 30 MIN: CPT | Performed by: INTERNAL MEDICINE

## 2022-02-22 PROCEDURE — 99214 OFFICE O/P EST MOD 30 MIN: CPT

## 2022-02-22 RX ORDER — SODIUM CHLORIDE 0.9 % (FLUSH) 0.9 %
10 SYRINGE (ML) INJECTION PRN
Status: CANCELLED | OUTPATIENT
Start: 2022-02-22

## 2022-02-22 RX ORDER — HEPARIN SODIUM (PORCINE) LOCK FLUSH IV SOLN 100 UNIT/ML 100 UNIT/ML
500 SOLUTION INTRAVENOUS PRN
Status: CANCELLED | OUTPATIENT
Start: 2022-02-22

## 2022-02-22 RX ORDER — SODIUM CHLORIDE 0.9 % (FLUSH) 0.9 %
10 SYRINGE (ML) INJECTION PRN
Status: DISCONTINUED | OUTPATIENT
Start: 2022-02-22 | End: 2022-02-23 | Stop reason: HOSPADM

## 2022-02-22 RX ORDER — HEPARIN SODIUM (PORCINE) LOCK FLUSH IV SOLN 100 UNIT/ML 100 UNIT/ML
500 SOLUTION INTRAVENOUS PRN
Status: DISCONTINUED | OUTPATIENT
Start: 2022-02-22 | End: 2022-02-23 | Stop reason: HOSPADM

## 2022-02-22 RX ADMIN — Medication 500 UNITS: at 08:16

## 2022-02-22 RX ADMIN — SODIUM CHLORIDE, PRESERVATIVE FREE 10 ML: 5 INJECTION INTRAVENOUS at 08:16

## 2022-02-22 NOTE — PROGRESS NOTES
PORT FLUSH    Patient presents to clinic for [facility] today. L  SQ port accessed per policy using 16O 3.69CD Campos needle for good blood return. Site flushed easily with 10 mL NSS followed by 5 mL Heparin solution 100 units/ml rinse prior to de-access. Dry sterile dressing to area. Tolerated procedure well. Encouraged to schedule port flush every 4 weeks.

## 2022-02-22 NOTE — PROGRESS NOTES
[place] [id number]           Attending Clinic Note     Reason for Visit: Follow-up on a patient with Metastatic Rectal Cancer.     PCP: Liudmila Scanlon MD     History of Present Illness:  65 y/o  male who was referred to see Dr. Thomas Mata (GI team) for evaluation of bright red blood per rectum, mild anemia and change in bowel habits with diarrhea. CEA 2640 on 10/19/2015. AlcP 299 AST 57 ALT 75 on 10/19/2015. Colonoscopy in 2013 noted no significant polyps, colitis or lesions at that time. Denies any Family History of colorectal cancer or polyps.     Colonoscopy on 10/19/2015 revealed:  1. Ascending polyp, 8 mm, hot snare: Tubulovillous adenoma. 2. Transverse polyp, 1 cm, hot snare: Serrated polyp most consistent with sessile serrated adenoma. 3. Five splenic flexure polyps, three - 5 mm, 7 mm, 8 mm, hot snare and biopsy: Four segments of Tubular Adenoma  4. Descending polyp, 5 mm, biopsy: Tubular adenoma. 5. Sigmoid polyp, 4 mm biopsy, Serrated polyp most consistent with sessile serrated adenoma. 6. Two rectal polyps, 4 mm biopsy: Serrated polyp most consistent with hyperplastic polyp. 7. Rectosigmoid colon mass (large mass approximately 65% circumference of the lumen; Very friable, firm and hard): Tubulovillous adenoma with associated focal erosion and fibroplasia.      CT scan abdomen/pelvis on 10/26/2015:  1. Small nodules at lung bases likely represent metastatic colon   cancer. 2. Extensive likely metastatic colon cancer throughout the liver.      3. Mild mural thickening and luminal narrowing in the   terminal ileum, otherwise nonspecific.      Colonoscopy with snare removal rectal mass was performed by Dr. Bobbi Bryant. Pathology proved:  Rectal polyp: Invasive adenocarcinoma involving villous adenoma and extending to the cauterized edge of excision. KRAS Mutation: Mutation detected.   BRAF Mutation: Mutation not detected. 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     CEA 3488. Bone scan on 11/10/2015 noted no metastatic disease. CT chest on 11/10/2015 revealed Multiple pulmonary nodules in the upper and lower lobes consistent with metastatic disease;   Hepatic metastasis also visualized. 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 Mediport was placed. Cycle # 1 of FOLFOX + Avastin was on 11/23/2015. CEA was 4555 on 11/23/2015. Cycle # 2 of FOLFOX + Avastin was on 12/08/2015. CEA was 3160 on 12/08/2015. Cycle # 3 of FOLFOX + Avastin was on 12/22/2015. CEA was 2516 on 12/21/2015. Cycle # 4 of FOLFOX + Avastin was on 01/05/2016. CEA was 2098 on 01/05/2015. Cycle # 5 of FOLFOX + Avastin was on 01/19/2016. CEA was 1511 on 01/05/2015.     -Bone scan on 01/26/2016 noted no metastatic disease. CT chest on 01/26/2016 revealed Significant response to treatment with no visible residual nodules. CT scan abdomen/pelvis on 01/26/2016 noted Interval decreased size of multiple masses in the liver compatible with treatment response. Continue another 2 months of FOLFOX + Avastin and repeat scans. Cycle # 6 of FOLFOX + Avastin was on 02/02/2016.  on 02/02/2016. Cycle # 7 of FOLFOX + Avastin was on 02/16/2016.  on 02/16/2016. Cycle # 8 of FOLFOX + Avastin was on 03/01/2016.  on 03/01/2016. Cycle # 9 of FOLFOX + Avastin was on 03/15/2016.  on 03/15/2016. Cycle # 10 of FOLFOX + Avastin was on 03/29/2016. .4 on 03/29/2016. Cycle # 11 of FOLFOX + Avastin was on 04/12/2016. .4 on 04/12/2016.     Re-staging scans on 04/19/2016: CT Chest: clear lungs; no evidence of recurring pulmonary nodule; CT Abdomen/Pelvis: Further interval decrease in size of the multiple metastatic hepatic lesions, the largest lesion now measures 3.9 x 3.5 cm and previously measured 4.5 cm in maximum diameter.  No mesenteric lymphadenopathy is identified; Bone Scan: No evidence of osseous metastasis. Continue same regimen and re-stage in 2-3 months. Cycle # 12 FOLFOX + Avastin was on 04/26/2016. .5 on 04/26/2016. Cycle # 13 FOLFOX + Avastin was on 05/10/2016. .3 on 05/10/2016. Cycle # 14 FOLFOX+AVASTIN was on 05/24/2016. .5 on 05/24/2016. Cycle # 15 FOLFOX + Avastin was on 06/07/2016. CEA 90.5 on 06/07/2016. Ad[facility]/13/2016-06/16/2016 for abdominal pain: EGD noted 1.5 cm clean based duodenal bulb ulceration s/p epinephrine and bicap per Dr. Zain Gaxiola. No active bleeding. A. Stomach, biopsy: Mild chronic gastritis, immunostain negative for Helicobacter  B. Esophagus, biopsy: Gastric glandular mucosa with prominent intestinal metaplasia ([place]'s epithelium), negative for epithelial dysplasia, esophageal squamous mucosa not identified  Cycle # 16 FOLFOX (discontinued avastin (bevacizumab) given association of peptic ulcer disease and known association of GI perforation) was on 06/21/2016. Cycle # 17 FOLFOX was on 07/05/2016. CEA 52.6 on 07/19/2016. Cycle # 17 FOLFOX was on 07/05/2016. CEA 52.6 on 07/05/2016. CEA 47.6 on 07/19/2016.     Re-staging scans 07/19/2106: CT Chest negative for metastatic disease. CT Abdomen/Pelvis: Stable hepatic lesions; Question new mural thickening in the cecum. Bone Scan: New Let hip lesion suspicious for bone metastasis.     Increased CEA; new mural thickening in the cecum and new left hip lesion suspicious for bone metastasis are consistent with disease progression; He derived maximum benefit from FOLFOX/AVASTIN. D/C FOLFOX/AVASTIN. We recommended FOLFIRI second line therapy. Cycle # 1 FOLFIRI was on 08/02/2016. CEA 40.8 on 08/02/2016. Xgeva q4 weeks started on 08/02/2016. Cycle # 2 FOLFIRI was on 08/16/2016. CEA 31.7 on 08/16/2016. Cycle # 3 FOLFIRI (added Avastin) was on 08/30/2016 given that ulcers healed on EGD 08/15/2016 by Dr. Maxim Duron; Protonix bid since avastin re-started.    Colonoscopy on 08/29/2016 (to look at the cecal area) unremarkable. CEA 26.7 on 08/30/2016. Cycle # 4 FOLFIRI/Avastin was on 09/13/2016. CEA 23.4 on 09/13/2016. Cycle # 5 FOLFIRI/Avastin was on 09/27/2016. CEA 22.3 on 09/27/2016. Cycle # 6 FOLFIRI/Avastin was on 10/11/2016. CEA 18.4 on 10/11/2016.      Bone scan 10/21/2016 stable bone metastasis; ? New lesion anterior left sixth rib likely interval healing fracture. CT abdomen/pelvis revealed stable hepatic metastasis. CT chest negative for metastatic disease. Continue FOLFIRI/Avastin and repeat scans in 3 months. Cycle # 7 FOLFIRI/Avastin was on 10/25/2016. CEA 16.1  Cycle # 8 FOLFIRI/Avastin was on 11/08/2016. CEA 15.7  Cycle # 9 FOLFIRI/Avastin was on 11/29/2016. CEA 13.6 on 11/29/2016. Cycle # 10 FOLFIRI/Avastin was on 12/13/2016. CEA 10.6 on 12/13/2016. Cycle # 11 FOLFIRI/Avastin was on 12/27/2016. CEA 11.1 on 12/27/2016. Cycle # 12 FOLFIRI/Avastin was on 01/10/2017. CEA 9.7 on 01/10/2017.       CT chest 01/23/2017 No new pulmonary nodule or lymphadenopathy. Patchy groundglass opacities within the left lower lobe, suggestive of infectious or inflammatory etiology. Followup CT chest in 3 months. Slight increased linear sclerosis along the left anterior sixth rib corresponding to an area of increased uptake on the prior bone scan from 10/21/2016, favored to be related to interval healing changes of a nondisplaced fracture. CT abdomen/pelvis on 01/23/2017 Redemonstration of multiple hepatic metastases, which are similar compared to the prior CT from 10/21/2016. Redemonstration of area of increased sclerosis along the right acetabulum suspicious for metastatic disease. Bone scan on 01/23/2017 Stable subtle uptake within the left proximal diaphysis, again suggestive of metastatic disease  Decreased subtle uptake within the medial right acetabulum.    Decreased uptake within the left anterior sixth rib corresponding to linear sclerosis on the recent CT, favored to be related to an joint rib fracture. Continue FOLFIRI + Avastin and repeat scans in 3 months. Cycle # 13 FOLFIRI + Avastin was on 01/24/2017. Cycle # 14 FOLFIRI + Avastin was on 02/07/2017. Cycle # 15 FOLFIRI + Avastin was on 02/21/2017. Cycle # 16 FOLFIRI + Avastin was on 03/07/2017. Cycle # 17 FOLFIRI + Avastin was on 03/21/2017. Cycle # 18 FOLFIRI + Avastin was on 04/04/2017. CT chest 04/17/2017 negative for metastatic disease. CT abdomen/pelvis 04/17/2017 Stable hypodense metastatic lesions within the liver. Stable area of increased sclerosis along the right acetabulum  Bone scan 04/17/2017 Stable subtle uptake within the left proximal femoral diaphysis; No definite abnormal uptake in the region of a sclerotic lesion along the posterior column of the right acetabulum noted on CT. Stable slight uptake within the left anterior sixth rib corresponding to linear sclerosis on the recent CT, favored to be related to a fracture. Continue FOLFIRI + Avastin and repeat scans in 3 months. Cycle # 19 FOLFIRI + Avastin was on 04/18/2017. Cycle # 20 FOLFIRI + Avastin was on 05/02/2017. Cycle # 21 FOLFIRI + Avastin was on 05/16/2017. Cycle # 22 FOLFIRI + Avastin was on 05/30/2017. Cycle # 23 FOLFIRI + Avastin was on 06/13/2017. Cycle # 24 FOLFIRI + Avastin was on 06/27/2017. Cycle # 25 FOLFIRI + Avastin was on 07/11/2017. Cycle # 26 FOLFIRI + Avastin was on 07/25/2017. Cycle # 27 FOLFIRI + Avastin was on 08/08/2017. Cycle # 28 FOLFIRI + Avastin was on 08/22/2017. Cycle # 29 FOLFIRI + Avastin was on 09/05/2017. Cycle # 30 FOLFIRI + Avastin was on 09/19/2017. Bone scan 09/26/2017 stable. CT abdomen/pelvis on 09/26/2017 Stable hypodense mass in the liver. Stable sclerotic lesion in the acetabulum. CT chest 09/26/2017 negative for metastatic disease. Cycle # 31 FOLFIRI + Avastin was on 10/03/2017. CEA 7.4 on 10/03/2017. Cycle # 32 FOLFIRI + Avastin was on 10/17/2017.  CEA 6.0 on 10/17/2017. Cycle # 33 FOLFIRI + Avastin was on 10/31/2017. CEA 6.8 on 10/31/2017. Cycle # 34 FOLFIRI + Avastin was on 11/14/2017. CEA 7.2 on 11/14/2017. Cycle # 35 FOLFIRI + Avastin was on 11/28/2017. CEA 5.1 on 11/28/2017. Cycle # 36 FOLFIRI + Avastin was on 12/12/2017. CEA 6.1 on 12/12/2017. Bone scan 12/22/2017 noted no evidence of metastatic disease to the axial or appendicular skeleton. CT chest 12/22/2017 noted no evidence of metastatic disease. CT abdomen/pelvis 12/22/2017 noted stable hypodense lesions in the liver. Continue FOLFIRI + Avastin and repeat scans in 3 months. Cycle # 37 FOLFIRI + Avastin was on 12/27/2018. Cycle # 38 FOLFIRI + Avastin was on 01/09/2018. Cycle # 39 FOLFIRI + Avastin was on 01/23/2018. Cycle # 40 FOLFIRI + Avastin was on 02/06/2018. Cycle # 41 FOLFIRI + Avastin was on 02/20/2018. Cycle # 42 FOLFIRI + Avastin was on 03/06/2018. Cycle # 43 FOLFIRI + Avastin was on 03/20/2018. Bone scan on 03/26/2018 negative for metastatic disease. CT chest 03/26/2018 noted ? new 7 mm nodule in LUCY. CT abdomen/pelvis 03/26/2018 noted stable hypodense lesions in the liver. ? New small ascites in the abdomen. Patient wants to continue to monitor the lung finding and repeat scans in 2 months instead of changing the current regimen into oral Lonsurf. Cycle # 44 FOLFIRI + Avastin was on 04/03/2018. CEA 6.3 on 04/03/2018. Cycle # 45 FOLFIRI + Avastin was on 04/24/2018. CEA 5.3 on 04/24/2018. Cycle # 46 FOLFIRI + Avastin was on 05/08/2018. CEA 5.4 on 05/08/2018. Cycle # 47 FOLFIRI + Avastin was on 05/22/2018. CEA 6.1 on 05/22/2018. CT chest 05/31/2018 noted stable nodule in LUCY. No evidence of worsening malignancy. CT abdomen/pelvis on 05/31/2018 noted stable liver metastasis. No evidence of worsening malignancy. Continue FOLFIRI + Avastin and repeat scans in 3 months. Cycle # 48 FOLFIRI + Avastin was on 06/06/2018. Cycle # 49 FOLFIRI + Avastin was on 06/19/2018. Cycle # 50 FOLFIRI + Avastin was on 07/03/2018. Cycle # 51 FOLFIRI + Avastin was on 07/24/2018. Cycle # 52 FOLFIRI + Avastin was on 08/14/2018. Cycle # 53 FOLFIRI + Avastin was on 08/28/2018. CT chest 09/06/2018 noted interval decrease in size of LUCY measuring up to 4 mm, suggestive of treatment response. No mediastinal or hilar LN  CT abdomen/pelvis 09/06/2018 Slight interval increase in size of a previously noted metastatic lesion within the right hepatic lobe. This may be related to differences in phase of enhancement compared to the most recent study from 5/31/2018, the lesion remains decreased in size compared to the more previous studies. No abdominal, retroperitoneal, or pelvic lymphadenopathy. Continue FOLFIRI + Avastin and repeat scans in 2 months. Cycle # 54 FOLFIRI + Avastin was on 09/11/2018. Cycle # 55 FOLFIRI + Avastin was on 09/25/2018. Cycle # 56 FOLFIRI + Avastin was on 10/09/2018. Cycle # 57 FOLFIRI + Avastin was on 10/23/2018. CT chest 11/02/2018 stable 3 mm nodule within LUCY. No new right and left lung nodule. No mediastinal or osseous lesion. CT abdomen/pelvis 11/02/2018 stable metastatic disease to liver. No new metastatic disease identified. No mesenteric or retroperitoneal LN. No gross colonic lesion identified. Continue FOLFIRI + Avastin and repeat scans in 3 months. Cycle # 58 FOLFIRI + Avastin was on 11/06/2018. CEA 7.6 on 11/05/2018. Cycle # 59 FOLFIRI + Avastin was on 11/27/2018. CEA 6.9 on 11/26/2018. Cycle # 60 FOLFIRI + Avastin was on 12/11/2018. CEA 6.7 on 12/11/2018. Cycle # 61 FOLFIRI + Avastin was on 01/08/2019. CEA 5.6 on 01/07/2019. Cycle # 62 FOLFIRI + Avastin was on 01/29/2019. CEA 4.6 on 01/28/2019. Cycle # 63 FOLFIRI + Avastin was on 02/12/2019. CEA 4.3 on 02/11/2019. CT chest 02/21/2019 no evidence of metastatic disease. CT abdomen/pelvis 02/21/2019 stable hypodense liver lesions. No evidence of worsening metastatic disease.  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 Ordered MRI thoracic spine and referred to neurosurgery team.    Cycle # 64 FOLFIRI + Avastin was on 02/26/2019. CEA 4.7 on 02/25/2019. Cycle # 65 FOLFIRI + Avastin was on 03/12/2019. CEA 4.0 on 03/11/2019. Cycle # 66 FOLFIRI + Avastin was on 03/26/2019. CEA 4.7 on 03/25/2019. Cycle # 67 FOLFIRI + Avastin was on 04/09/2019. CEA 5.6 on 04/08/2019. Cycle # 68 FOLFIRI + Avastin was on 04/30/2019. CEA 4.9 on 04/29/2019. Cycle # 69 FOLFIRI + Avastin was on 05/14/2019. CEA 5.3 on 05/14/2019. CT chest 05/23/2019 stable small lung nodule with no evidence of metastatic disease. CT abdomen/pelvis 05/23/2019 Decrease conspicuity of the liver lesions. No new lesions seen. Stable splenomegaly. Continue FOLFIRI + Avastin and repeat scans in 3 months. Cycle # 70 FOLFIRI + Avastin was on 06/04/2019. CEA 4.8 on 06/03/2019. Cycle # 71 FOLFIRI + Avastin was on 06/18/2019. CEA 4.6 on 06/17/2019. Cycle # 72 FOLFIRI + Avastin was on 07/09/2019. CEA 4.3 on 07/08/2019. Cycle # 73 FOLFIRI + Avastin was on 07/30/2019. CEA 5.0 on 07/29/2019. Cycle # 74 FOLFIRI + Avastin was on 08/13/2019. CEA 5.0 on 08/12/2019. CT chest 08/20/2019 negative  CT abdomen/pelvis 08/20/2019 Previous identified hepatic lesions are not visualized on the current exam.  Continue FOLFIRI + Avastin and repeat scans in 3 months. Cycle # 75 FOLFIRI + Avastin was on 08/27/2019. CEA 5.0 on 08/26/2019. Cycle # 76 FOLFIRI + Avastin was on 09/17/2019. CEA 5.5 on 09/16/2019. Cycle # 77 FOLFIRI + Avastin was on 10/15/2019. CEA 4.6 on 10/15/2019. Cycle # 78 FOLFIRI + Avastin was on 10/29/2019. CEA 4.1 on 10/28/2019. Cycle # 79 FOLFIRI + Avastin was on 11/12/2019. CEA 4.3 on 11/12/2019. Cycle # 80 FOLFIRI + Avastin was on 12/10/2019. CEA 4.0 on 12/09/2019.   CT chest 12/19/2019 Stable 3 mm nodule within the left upper lobe, similar to the previous studies dating back to 11/2/2018, however decreased in size compared to the CT from 3/26/2018. No thoracic LN. CT abdomen/pelvis 12/19/2019 Previously described small hypodense lesions are more conspicuous on the current study compared to the recent study throughout the liver from 8/20/2019, however similar to the prior study from 2/21/2019. Findings may be related to differences in contrast opacification. No abdominal or pelvic lymphadenopathy. Continue FOLFIRI + Avastin and repeat scans in 2 months to f/u on liver lesions. Cycle # 81 FOLFIRI + Avastin was on 01/07/2020. CEA 3.8 on 01/06/2020. Cycle # 82 FOLFIRI + Avastin was on 01/21/2020. CEA 3.7 on 01/20/2020. Cycle # 83 FOLFIRI + Avastin was on 02/04/2020. CEA 4.1 on 02/03/2020. Cycle # 84 FOLFIRI + Avastin was on 02/18/2020. CEA 4.6 on 02/17/2020. EGD by Dr. Celia Gold 03/02/2020 showing no masses or lesions  Cycle # 85 FOLFIRI + Avastin was on 03/03/2020. CEA 7.4 on 03/02/2020. Cycle # 86 FOLFIRI + Avastin was on 03/17/2020. CEA 4.5 on 03/16/2020. Colonoscopy on 03/23/2020 by Dr. Celia aguero (records requested). Cycle # 87 FOLFIRI + Avastin was on 03/31/2020. CEA 4.1 on 03/30/2020. Cycle # 88 FOLFIRI + Avastin was on 04/21/2020. CEA 6.4 on 04/20/2020. Cycle # 89 FOLFIRI + Avastin was on 05/05/2020. CEA 5.8 on 05/04/2020. Cycle # 90 FOLFIRI + Avastin was on 06/02/2020. CEA 5.5 on 06/01/2020. Cycle # 91 FOLFIRI + Avastin was on 06/16/2020. CEA 5.6 on 06/15/2020. CT chest 06/23/2020 noted no metastatic disease in the chest.   CT abdomen/pelvis 06/23/2020 Stable small liver lesions measuring up to 5 mm since 2019.   22 mm round mass in segment 8 of the liver with central high density stable from December 2019), previously measuring up to 34 mm in 2017. No additional metastatic disease in the abdomen or pelvis. Small amount of ascites. Overall stable disease. Continue FOLFIRI + Avastin and repeat scans in 2-3 months. Cycle # 92 FOLFIRI + Avastin was on 07/07/2020.  CEA 5.7 on 07/06/2020. Cycle # 93 FOLFIRI + Avastin was on 07/21/2020. CEA 5.9 on 07/20/2020. Cycle # 94 FOLFIRI + Avastin was on 08/04/2020. CEA 5.5 on 08/04/2020. Cycle # 95 FOLFIRI + Avastin was on 08/25/2020. CEA 6.1 on 08/24/2020. CT chest 9/2/2020 stable unremarkable enhanced CT of the thorax. There is no metastatic disease to the lungs. CT abdomen pelvis on 9/2/2020 stable 2.2 cm low attenuated lesion within the central aspect of the right hepatic lobe favored to represent treated metastatic disease. No new hepatic lesion is identified. Stable splenomegaly  There is no appreciable colonic lesion or stricture  Pericholecystic fluid of uncertain etiology. General surgery team consulted. Laparoscopic cholecystectomy with normal cholangiogram 10/27/20  Gallbladder: Chronic cholecystitis and cholelithiasis.  Unremarkable regional lymph node. 11/13/2020; Seen by Dr. Jorge Varela from General surgery team; cleared to re-start chemotherapy. Cycle # 96 FOLFIRI + Avastin was on 11/17/2020. CEA 3.6 on 11/16/2020. Cycle # 97 FOLFIRI + Avastin was on 12/01/2020. CEA 3.5 on 11/30/2020. Cycle # 98 FOLFIRI + Avastin was on 12/15/2020. CEA 4.3 on 12/15/2020. Cycle # 99 FOLFIRI + Avastin was on 01/05/2021. CEA 4.7 on 01/04/2021. CT chest 01/13/2021 negative for metastatic disease. CT abdomen/pelvis 01/13/2021 Unchanged central hepatic lesion. No specific signs of abdominopelvic metastatic disease. Continue FOLFIRI + Avastin and repeat scans in 3 months. Cycle # 100 FOLFIRI + Avastin was on 01/19/2021. CEA 4.7 on 01/18/2021. Cycle # 101 FOLFIRI + Avastin was on 02/02/2021. CEA 5.2 on 02/01/2021. Cycle # 102 FOLFIRI + Avastin was on 02/16/2021. CEA 5.6 on 02/15/2021. Cycle # 103 FOLFIRI + Avastin was on 03/02/2021. Cycle # 104 FOLFIRI (20% dose reduced due to significant toxicity) + Avastin was on 03/16/2021. Cycle # 105 FOLFIRI (same dose as cycle # 104) + Avastin was on 03/30/2021.  CEA 6.5 on 03/29/2021. Cycle # 106 FOLFIRI (same dose as cycle # 104) + Avastin was on 04/13/2021. CEA 6.0 on 04/12/2021  CT chest 04/20/2021 no evidence of metastatic disease. CT abdomen/pelvis 04/20/2021 No evidence of progressive metastatic disease. Stable 2 cm lesion in the right lobe of the liver, likely representing treated metastatic disease. Imaging reviewed. Continue FOLFIRI + Avastin and repeat scans in 3 months  Cycle # 107 FOLFIRI + Avastin was on 04/27/2021  Cycle # 108 FOLFIRI + Avastin was on 05/11/2021. Cycle # 109 FOLFIRI (held Avastin due to upcoming surgery) on 05/25/2021. CEA 6.3 on 5/24/21  Cycle # 110 FOLFIRI (held Avastin due to upcoming surgery) on 06/08/2021. CEA 6.3 on 6/07/21. Right inguinal/scrotol hernia repair on 07/13/2021 with Dr Madonna Cherry with folely removal on Friday 07/23/2021. He developed fever on 07/24/2021 and was brought to [address] [place], [place] [address] ER via EMS; Sepsis secondary to urinary tract infection   Completed Abx  CEA 4.1 on 08/30/2021  Cycle # 111 FOLFIRI was on 08/31/2021. HBP team for evaluation of liver lesions; His lesion does appear to be a hemangioma as his prior metastatic deposits in his liver were hypo attenuating compared to this CT which is hyper attenuating. compared to his prior CT scans he has disappearing liver metastasis. MRI liver on 09/17/2021 No suspicious liver lesion identified. Colonoscopy GI Dr. Alaina Young on 09/20/2021 unremarkable  CEA 3.3 on 09/27/2021. CEA 3.2 on 10/25/2021. CEA 2.7 on 11/22/2021. CEA 2.5 on 12/27/2021. CEA 2.4 on 01/24/2022. CEA 2.3 on 02/21/2022. Today 02/22/2022 for f/u. No fever, chills. No nausea/vomiting. Fair appetite and energy level. + arthritis neuropathy      Review of Systems;  CONSTITUTIONAL: No fever, chills. Fair appetite and energy level  ENMT: Eyes: No diplopia; Nose: No epistaxis. Mouth:No lesions  RESPIRATORY: No hemoptysis, shortness of breath. CARDIOVASCULAR: No chest pain, palpitations.   GASTROINTESTINAL:No N/V, abdominal pain. GENITOURINARY: No dysuria, urinary frequency, hematuria. MSK: + Arthritis neuropathy  NEURO: No syncope, presyncope, headache. Remainder ROS: Negative. Past Medical History:   Past Medical History               Diagnosis Date    Arthritis      Cancer ([address]         colon    Depression      Hyperlipidemia      Hypertension           Medications:  Reviewed and reconciled.     Allergies: Allergies   Allergen Reactions    Neosporin [Neomycin-Polymyxin-Gramicidin] Rash    Tape Barbara Bicker Tape] Rash      Physical Exam:  BP (!) 147/73   Pulse 66   Ht 6' 1\" (1.854 m)   Wt 213 lb 12.8 oz (97 kg)   SpO2 97%   BMI 28.21 kg/m²   GENERAL: Alert, oriented x 3, not in distress  HEENT: PERRLA, EOMI. NECK: Supple. Without lymphadenopathy. LUNGS: CTA bilaterally, with no wheezing, crackles or rhonchi. CARDIOVASCULAR: Regular rhythm. No murmurs, rubs or gallops. ABDOMEN: Soft. Non-tender, non-distended. EXTREMITIES: Without clubbing, cyanosis  NEUROLOGIC: No focal deficits. ECOG PS 1    Diagnostics:  Lab Results   Component Value Date    WBC 5.0 02/21/2022    HGB 10.2 (L) 02/21/2022    HCT 31.1 (L) 02/21/2022    MCV 93.7 02/21/2022     (L) 02/21/2022     Lab Results   Component Value Date     02/21/2022    K 3.7 02/21/2022     02/21/2022    CO2 26 02/21/2022    BUN 26 (H) 02/21/2022    CREATININE 1.5 (H) 02/21/2022    GLUCOSE 102 (H) 02/21/2022    CALCIUM 10.1 02/21/2022    PROT 7.5 02/21/2022    LABALBU 4.0 02/21/2022    BILITOT 0.7 02/21/2022    ALKPHOS 142 (H) 02/21/2022    AST 24 02/21/2022    ALT 12 02/21/2022    LABGLOM 46 02/21/2022    GFRAA 56 02/21/2022     Lab Results   Component Value Date    CEA 2.3 02/21/2022     Impression/Plan:  65 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 KRAS Mutation: Mutation detected. BRAF Mutation: Mutation not detected. 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   CT scan abdomen/pelvis on 10/26/2015 revealed small nodules at the lung bases, extensive liver lesions consistent with metastatic rectosigmoid cancer. CEA 3488 on 10/30/2015. Bone scan on 11/10/2015 noted no metastatic disease. CT chest on 11/10/2015 revealed Multiple pulmonary nodules in the upper and lower lobes consistent with metastatic disease; Hepatic metastasis also visualized. 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 Mediport was placed. Cycle # 1 of FOLFOX + Avastin was on 11/23/2015. CEA was 4555 on 11/23/2015. Cycle # 2 of FOLFOX + Avastin was on 12/08/2015. CEA was 3160 on 12/08/2015. Cycle # 3 of FOLFOX + Avastin was on 12/22/2015. CEA was 2516 on 12/21/2015. Cycle # 4 of FOLFOX + Avastin was on 01/05/2016. CEA was 2098 on 01/05/2015. Cycle # 5 of FOLFOX + Avastin was on 01/19/2016. CEA was 1511 on 01/05/2015.     -Bone scan on 01/26/2016 noted no metastatic disease. CT chest on 01/26/2016 revealed Significant response to treatment with no visible residual nodules. CT scan abdomen/pelvis on 01/26/2016 noted Interval decreased size of multiple masses in the liver compatible with treatment response. Continue another 2 months of FOLFOX + Avastin and repeat scans. Cycle # 6 of FOLFOX + Avastin was on 02/02/2016.  on 02/02/2016. Cycle # 7 of FOLFOX + Avastin was on 02/16/2016.  on 02/16/2016. Cycle # 8 of FOLFOX + Avastin was on 03/01/2016.  on 03/01/2016. Cycle # 9 of FOLFOX + Avastin was on 03/15/2016.  on 03/15/2016. Cycle # 10 of FOLFOX + Avastin was on 03/29/2016. .4 on 03/29/2016. Cycle # 11 of FOLFOX + Avastin was on 04/12/2016.  .4 on 04/12/2016.     Re-staging scans on 04/19/2016: CT Chest: clear lungs; no evidence of recurring pulmonary nodule; CT Abdomen/Pelvis: Further interval decrease in size of the multiple metastatic hepatic lesions, the largest lesion now measures 3.9 x 3.5 cm and previously measured 4.5 cm in maximum diameter. No mesenteric lymphadenopathy is identified; Bone Scan: No evidence of osseous metastasis. Continue same regimen and re-stage in 2-3 months. Cycle # 12 FOLFOX + Avastin was on 04/26/2016. .5 on 04/26/2016. Cycle # 13 FOLFOX + Avastin was on 05/10/2016. .3 on 05/10/2016. Cycle # 14 FOLFOX+AVASTIN was on 05/24/2016. .5 on 05/24/2016. Cycle # 15 FOLFOX + Avastin was on 06/07/2016. CEA 90.5 on 06/07/2016. Admitted to [facility] 06/13/2016-06/16/2016 for abdominal pain: EGD noted 1.5 cm clean based duodenal bulb ulceration s/p epinephrine and bicap per Dr. Tito Narvaez. No active bleeding. A. Stomach, biopsy: Mild chronic gastritis, immunostain negative for Helicobacter  B. Esophagus, biopsy: Gastric glandular mucosa with prominent ntestinal metaplasia ([place]'s epithelium), negative for epithelial dysplasia, esophageal squamous mucosa not identified     Cycle # 16 FOLFOX (discontinued avastin (bevacizumab) given association of peptic ulcer disease and known association of GI perforation.) was on 06/21/2016. CEA 47 on 06/21/2016. Cycle # 17 FOLFOX was on 07/05/2016. CEA 52.6 on 07/05/2016. CEA 47.6 on 07/19/2016.     Re-staging scans 07/19/2106: CT Chest negative for metastatic disease. CT Abdomen/Pelvis: Stable hepatic lesions; Question new mural thickening in the cecum. Bone Scan: New Let hip lesion suspicious for bone metastasis.     Increased CEA; new mural thickening in the cecum and new left hip lesion suspicious for bone metastasis are consistent with disease progression; He derived maximum benefit from FOLFOX/AVASTIN. D/C FOLFOX/AVASTIN. We recommended FOLFIRI second line therapy. Cycle # 1 FOLFIRI was on 08/02/2016. CEA 40.8 on 08/02/2016. Xgeva q4 weeks started on 08/02/2016. Cycle # 2 FOLFIRI was on 08/16/2016. CEA 31.7 on 08/16/2016.   Cycle # 3 FOLFIRI (added Avastin) was on 08/30/2016 given that ulcers healed on EGD 08/15/2016 by Dr. Cassie Alejandra; Protonix bid since avastin re-started. Colonoscopy on 08/29/2016 (to look at the cecal area) unremarkable. CEA 26.7 on 08/30/2016. Cycle # 4 FOLFIRI/Avastin was on 09/13/2016. CEA 23.4 on 09/13/2016. Cycle # 5 FOLFIRI/Avastin was on 09/27/2016. CEA 22.3 on 09/27/2016. Cycle # 6 FOLFIRI/Avastin was on 10/11/2016. CEA 18.4 on 10/11/2016.      Bone scan 10/21/2016 stable bone metastasis; ? New lesion anterior left sixth rib likely interval healing fracture. CT abdomen/pelvis revealed stable hepatic metastasis. CT chest negative for metastatic disease. Continue FOLFIRI/Avastin and repeat scans in 3 months. Cycle # 7 FOLFIRI/Avastin was on 10/25/2016. CEA 16.1 on 10/25/2016. Cycle # 8 FOLFIRI/Avastin was on 11/08/2016. CEA 15.7 on 11/08/2016. Cycle # 9 FOLFIRI/Avastin was on 11/29/2016. CEA 13.6 on 11/29/2016. Cycle # 10 FOLFIRI/Avastin was on 12/13/2016. CEA 10.6 on 12/13/2016. Cycle # 11 FOLFIRI/Avastin was on 12/27/2016. CEA 11.1 on 12/27/2016. Cycle # 12 FOLFIRI/Avastin was on 01/10/2017. CEA 9.7 on 01/10/2017. CT chest 01/23/2017 No new pulmonary nodule or lymphadenopathy. Patchy groundglass opacities within the left lower lobe, suggestive of infectious or inflammatory etiology. Followup CT chest in 3 months. Slight increased linear sclerosis along the left anterior sixth rib corresponding to an area of increased uptake on the prior bone scan from 10/21/2016, favored to be related to interval healing changes of a nondisplaced fracture. CT abdomen/pelvis on 01/23/2017 Redemonstration of multiple hepatic metastases, which are similar compared to the prior CT from 10/21/2016. Redemonstration of area of increased sclerosis along the right acetabulum suspicious for metastatic disease. Bone scan on 01/23/2017 Stable subtle uptake within the left proximal diaphysis, again suggestive of metastatic disease  Decreased subtle uptake within the medial right acetabulum.    Decreased uptake within the left anterior sixth rib corresponding to linear sclerosis on the recent CT, favored to be related to an joint rib fracture. Continue FOLFIRI + Avastin and repeat scans in 3 months. Cycle # 13 FOLFIRI + Avastin was on 01/24/2017. Cycle # 14 FOLFIRI + Avastin was on 02/07/2017. Cycle # 15 FOLFIRI + Avastin was on 02/21/2017. Cycle # 16 FOLFIRI + Avastin was on 03/07/2017. Cycle # 17 FOLFIRI + Avastin was on 03/21/2017. Cycle # 18 FOLFIRI + Avastin was on 04/04/2017. CT chest 04/17/2017 negative for metastatic disease. CT abdomen/pelvis 04/17/2017 Stable hypodense metastatic lesions within the liver. Stable area of increased sclerosis along the right acetabulum  Bone scan 04/17/2017 Stable subtle uptake within the left proximal femoral diaphysis; No definite abnormal uptake in the region of a sclerotic lesion along the posterior column of the right acetabulum noted on CT. Stable slight uptake within the left anterior sixth rib corresponding to linear sclerosis on the recent CT, favored to be related to a fracture. Continue FOLFIRI + Avastin and repeat scans in 3 months. Cycle # 19 FOLFIRI + Avastin was on 04/18/2017. CEA 7.5 on 04/18/2017. Cycle # 20 FOLFIRI + Avastin was on 05/02/2017. CEA 7.7 on 05/02/2017. Cycle # 21 FOLFIRI + Avastin was on 05/16/2017. CEA 7.1 on 05/16/2017. Cycle # 22 FOLFIRI + Avastin was on 05/30/2017. CEA 8.2 on 05/30/2017. Cycle # 23 FOLFIRI + Avastin was on 06/13/2017. CEA 7.7 on 06/13/2017. Cycle # 24 FOLFIRI + Avastin was on 06/27/2017. CEA 6.6 on 06/27/2017. Re-staging scans 07/05/2017:  Bone scan stable proximal left femoral diaphysis, right acetabulum, and left anterior sixth rib lesions;  CT abdomen/pelvis stable hypodense metastatic lesions within the liver; CT chest without convincing evidence of metastatic disease. Cycle # 25 FOLFIRI + Avastin was on 07/11/2017. CEA 6.3 on 07/11/2017. Cycle # 26 FOLFIRI + Avastin was on 07/25/2017.   CEA 7.3 on 07/25/2017. Cycle # 27 FOLFIRI + Avastin was on 08/08/2017. CEA 6.5 on 08/08/2017. Cycle # 28 FOLFIRI + Avastin was on 08/22/2017. CEA 5.9 on 08/22/2017. Cycle # 29 FOLFIRI + Avastin was on 09/05/2017. CEA 6.3 on 09/05/2017. Cycle # 30 FOLFIRI + Avastin was on 09/19/2017. CEA 6.3 on 09/19/2017. Bone scan 09/26/2017 stable. CT abdomen/pelvis on 09/26/2017 Stable hypodense mass in the liver. Stable sclerotic lesion in the acetabulum. CT chest 09/26/2017 negative for metastatic disease. Cycle # 31 FOLFIRI + Avastin was on 10/03/2017. CEA 7.4 on 10/03/2017. Cycle # 32 FOLFIRI + Avastin was on 10/17/2017. CEA 6.0 on 10/17/2017. Cycle # 33 FOLFIRI + Avastin was on 10/31/2017. CEA 6.8 on 10/31/2017. Cycle # 34 FOLFIRI + Avastin was on 11/14/2017. CEA 7.2 on 11/14/2017. Cycle # 35 FOLFIRI + Avastin was on 11/28/2017. CEA 5.1 on 11/28/2017. Cycle # 36 FOLFIRI + Avastin was on 12/12/2017. CEA 6.1 on 12/12/2017. Bone scan 12/22/2017 noted no evidence of metastatic disease to the axial or appendicular skeleton. CT chest 12/22/2017 noted no evidence of metastatic disease. CT abdomen/pelvis 12/22/2017 noted stable hypodense lesions in the liver. Continue FOLFIRI + Avastin and repeat scans in 3 months. Cycle # 37 FOLFIRI + Avastin was on 12/27/2018. CEA 6.7 on 12/27/2017. Cycle # 38 FOLFIRI + Avastin was on 01/09/2018. CEA 5.0 on 01/09/2018. Cycle # 39 FOLFIRI + Avastin was on 01/23/2018. CEA 6.5 on 01/23/2018. Cycle # 40 FOLFIRI + Avastin was on 02/06/2018. CEA 6.9 on 02/06/2018. Cycle # 41 FOLFIRI + Avastin was on 02/20/2018. CEA 6.4 on 02/20/2018. Cycle # 42 FOLFIRI + Avastin was on 03/06/2018. CEA 6.6 on 03/06/2018. Cycle # 43 FOLFIRI + Avastin was on 03/20/2018. CEA 5.7 on 03/20/2018. Bone scan on 03/26/2018 negative for metastatic disease. CT chest 03/26/2018 noted ? new 7 mm nodule in LUCY. CT abdomen/pelvis 03/26/2018 noted stable hypodense lesions in the liver.  ? New small ascites in the abdomen. Patient wants to continue to monitor the lung finding and repeat scans in 2 months instead of changing the current chemotherapy regimen to oral Lonsurf. Cycle # 44 FOLFIRI + Avastin was on 04/03/2018. CEA 6.3 on 04/03/2018. Cycle # 45 FOLFIRI + Avastin was on 04/24/2018. CEA 5.3 on 04/24/2018. Cycle # 46 FOLFIRI + Avastin was on 05/08/2018. CEA 5.4 on 05/08/2018. Cycle # 47 FOLFIRI + Avastin was on 05/22/2018. CEA 6.1 on 05/22/2018. CT chest 05/31/2018 noted stable nodule in LUCY. No evidence of worsening malignancy. CT abdomen/pelvis on 05/31/2018 noted stable liver metastasis. No evidence of worsening malignancy. Continue FOLFIRI + Avastin and repeat scans in 3 months. Cycle # 48 FOLFIRI + Avastin was on 06/06/2018. CEA 6.3 on 06/05/2018. Cycle # 49 FOLFIRI + Avastin was on 06/19/2018. CEA 6.1 on 06/19/2018. Cycle # 50 FOLFIRI + Avastin was on 07/03/2018. CEA 7.4 on 07/03/2018. Cycle # 51 FOLFIRI + Avastin was on 07/24/2018. CEA 6.7 on 07/24/2018. Cycle # 52 FOLFIRI + Avastin was on 08/14/2018. CEA 6.8 on 08/14/2018. Cycle # 53 FOLFIRI + Avastin was on 08/28/2018. CEA 6.5 on 08/27/2018. CT chest 09/06/2018 noted interval decrease in size of LUCY measuring up to 4 mm, suggestive of treatment response. No mediastinal or hilar LN  CT abdomen/pelvis 09/06/2018 Slight interval increase in size of a previously noted metastatic lesion within the right hepatic lobe. This may be related to differences in phase of enhancement compared to the most recent study from 5/31/2018, the lesion remains decreased in size compared to the more previous studies. No abdominal, retroperitoneal, or pelvic lymphadenopathy. Continue FOLFIRI + Avastin and repeat scans in 2 months. Cycle # 54 FOLFIRI + Avastin was on 09/11/2018. CEA 6.4 on 09/10/2018. Cycle # 55 FOLFIRI + Avastin was on 09/25/2018. CEA 6.4 on 09/25/2018. Cycle # 56 FOLFIRI + Avastin was on 10/09/2018. CEA 6.7 on 10/08/2018.   Cycle # 62 FOLFIRI + Avastin was on 10/23/2018. CEA 7.2 on 10/22/2018. CT chest 11/02/2018 stable 3 mm nodule within LUCY. No new right and left lung nodule. No mediastinal or osseous lesion. CT abdomen/pelvis 11/02/2018 stable metastatic disease to liver. No new metastatic disease identified. No mesenteric or retroperitoneal LN. No gross colonic lesion identified. Continue FOLFIRI + Avastin and repeat scans in 3 months. Cycle # 58 FOLFIRI + Avastin was on 11/06/2018. CEA 7.6 on 11/05/2018. Cycle # 59 FOLFIRI + Avastin was on 11/27/2018. CEA 6.9 on 11/26/2018. Cycle # 60 FOLFIRI + Avastin was on 12/11/2018. CEA 6.7 on 12/11/2018. Cycle # 61 FOLFIRI + Avastin was on 01/08/2019. CEA 5.6 on 01/07/2019. Cycle # 62 FOLFIRI + Avastin was on 01/29/2019. CEA 4.6 on 01/28/2019. Cycle # 63 FOLFIRI + Avastin was on 02/12/2019. CEA 4.3 on 02/11/2019. CT chest 02/21/2019 no evidence of metastatic disease. CT abdomen/pelvis 02/21/2019 stable hypodense liver lesions. No evidence of worsening metastatic disease. 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 Ordered MRI thoracic spine and referred to neurosurgery team.  Cycle # 64 FOLFIRI + Avastin was on 02/26/2019. CEA 4.7 on 02/25/2019. Cycle # 65 FOLFIRI + Avastin was on 03/12/2019. CEA 4.0 on 03/11/2019. Cycle # 66 FOLFIRI + Avastin was on 03/26/2019. CEA 4.7 on 03/25/2019. Cycle # 67 FOLFIRI + Avastin was on 04/09/2019. CEA 5.6 on 04/08/2019. Cycle # 68 FOLFIRI + Avastin was on 04/30/2019. CEA 4.9 on 04/29/2019. Cycle # 69 FOLFIRI + Avastin was on 05/14/2019. CEA 5.3 on 05/14/2019. CT chest 05/23/2019 stable small lung nodule with no evidence of metastatic disease. CT abdomen/pelvis 05/23/2019 Decrease conspicuity of the liver lesions. No new lesions seen. Stable splenomegaly. Continue FOLFIRI + Avastin and repeat scans in 3 months. Cycle # 70 FOLFIRI + Avastin was on 06/04/2019. CEA 4.8 on 06/03/2019.    Cycle # 71 FOLFIRI + Avastin was on 06/18/2019. CEA 4.6 on 06/17/2019. Cycle # 72 FOLFIRI + Avastin was on 07/09/2019. CEA 4.3 on 07/08/2019. Cycle # 73 FOLFIRI + Avastin was on 07/30/2019. CEA 5.0 on 07/29/2019. Cycle # 74 FOLFIRI + Avastin was on 08/13/2019. CEA 5.0 on 08/12/2019. CT chest 08/20/2019 negative  CT abdomen/pelvis 08/20/2019 Previous identified hepatic lesions are not visualized on the current exam.  Continue FOLFIRI + Avastin and repeat scans in 3 months. Cycle # 75 FOLFIRI + Avastin was on 08/27/2019. CEA 5.0 on 08/26/2019. Cycle # 76 FOLFIRI + Avastin was on 09/17/2019. CEA 5.5 on 09/16/2019. Cycle # 77 FOLFIRI + Avastin was on 10/15/2019. CEA 4.6 on 10/15/2019. Cycle # 78 FOLFIRI + Avastin was on 10/29/2019. CEA 4.1 on 10/28/2019. Cycle # 79 FOLFIRI + Avastin was on 11/12/2019. CEA 4.3 on 11/12/2019. Cycle # 80 FOLFIRI + Avastin was on 12/10/2019. CEA 4.0 on 12/09/2019. CT chest 12/19/2019 Stable 3 mm nodule within the left upper lobe, similar to the previous studies dating back to 11/2/2018, however decreased in size compared to the CT from 3/26/2018. No thoracic LN. CT abdomen/pelvis 12/19/2019 Previously described small hypodense lesions are more conspicuous on the current study compared to the recent study throughout the liver from 8/20/2019, however similar to the prior study from 2/21/2019. Findings may be related to differences in contrast opacification. No abdominal or pelvic lymphadenopathy. Continue FOLFIRI + Avastin and repeat scans in 2 months to f/u on liver lesions. Cycle # 81 FOLFIRI + Avastin was on 01/07/2020. CEA 3.8 on 01/06/2020. Cycle # 82 FOLFIRI + Avastin was on 01/21/2020. CEA 3.7 on 01/20/2020. Cycle # 83 FOLFIRI + Avastin was on 02/04/2020. CEA 4.1 on 02/03/2020. Cycle # 84 FOLFIRI + Avastin was on 02/18/2020. CEA 4.6 on 02/17/2020. CT chest 2/28/2020 no evidence of metastatic disease to the lungs and no mediastinal or hilar adenopathy.    CT abdomen pelvis 2/28/2020 no enhancing lesions seen within the liver to suggest metastatic disease. Wall thickening of the rectosigmoid junction. Images reviewed. Continue FOLFIRI Avastin and repeat scans in 3 months  EGD by Dr. Tracey Sabillon 03/02/2020 showing no masses or lesions. Cycle # 85 FOLFIRI + Avastin was on 03/03/2020. CEA 7.4 on 03/02/2020. Cycle # 86 FOLFIRI + Avastin was on 03/17/2020. CEA 4.5 on 03/16/2020. Colonoscopy on 03/23/2020 by Dr. Tracey Sabillon unremarkable (records requested). Cycle # 87 FOLFIRI + Avastin was on 03/31/2020. CEA 4.1 on 03/30/2020. Cycle # 88 FOLFIRI + Avastin was on 04/21/2020. CEA 6.4 on 04/20/2020. Cycle # 89 FOLFIRI + Avastin was on 05/05/2020. CEA 5.8 on 05/04/2020. Cycle # 90 FOLFIRI + Avastin was on 06/02/2020. CEA 5.5 on 06/01/2020. Cycle # 91 FOLFIRI + Avastin was on 06/16/2020. CEA 5.6 on 06/15/2020. CT chest 06/23/2020 noted no metastatic disease in the chest.   CT abdomen/pelvis 06/23/2020 Stable small liver lesions measuring up to 5 mm since 2019.   22 mm round mass in segment 8 of the liver with central high density stable from December 2019), previously measuring up to 34 mm in 2017. No additional metastatic disease in the abdomen or pelvis. Small amount of ascites. Overall stable disease. Continue FOLFIRI + Avastin and repeat scans in 2-3 months. Cycle # 92 FOLFIRI + Avastin was on 07/07/2020. CEA 5.7 on 07/06/2020. Cycle # 93 FOLFIRI + Avastin was on 07/21/2020. CEA 5.9 on 07/20/2020. Cycle # 94 FOLFIRI + Avastin was on 08/04/2020. CEA 5.5 on 08/04/2020. Cycle # 95 FOLFIRI + Avastin was on 08/25/2020. CEA 6.1 on 08/24/2020. CT chest 9/2/2020 stable unremarkable enhanced CT of the thorax. There is no metastatic disease to the lungs. CT abdomen pelvis on 9/2/2020 stable 2.2 cm low attenuated lesion within the central aspect of the right hepatic lobe favored to represent treated metastatic disease. No new hepatic lesion is identified.   Stable splenomegaly  There is no appreciable colonic lesion or stricture. Pericholecystic fluid of uncertain etiology. Laparoscopic cholecystectomy with normal cholangiogram 10/27/20  Gallbladder: Chronic cholecystitis and cholelithiasis.  Unremarkable regional lymph node. 11/13/2020; Seen by Dr. Federico Mayer from General surgery team; cleared to re-start chemotherapy. Cycle # 96 FOLFIRI + Avastin was on 11/17/2020. CEA 3.6 on 11/16/2020. Cycle # 97 FOLFIRI + Avastin was on 12/01/2020. CEA 3.5 on 11/30/2020. Cycle # 98 FOLFIRI + Avastin was on 12/15/2020. CEA 4.3 on 12/15/2020. Cycle # 99 FOLFIRI + Avastin was on 01/05/2021. CEA 4.7 on 01/04/2021. CT chest 01/13/2021 negative for metastatic disease. CT abdomen/pelvis 01/13/2021 Unchanged central hepatic lesion. No specific signs of abdominopelvic metastatic disease. Continue FOLFIRI + Avastin and repeat scans in 3 months. Cycle # 100 FOLFIRI + Avastin was on 01/19/2021. CEA 4.7 on 01/18/2021. Cycle # 101 FOLFIRI + Avastin was on 02/02/2021. CEA 5.2 on 02/01/2021. Cycle # 102 FOLFIRI + Avastin was on 02/16/2021. CEA 5.6 on 02/15/2021. Cycle # 103 FOLFIRI + Avastin was on 03/02/2021. Cycle # 104 FOLFIRI (20% dose reduced due to significant toxicity) + Avastin was on 03/16/2021. Cycle # 105 FOLFIRI (same dose as cycle # 104) + Avastin was on 03/30/2021. CEA 6.5 on 03/29/2021. Cycle # 106 FOLFIRI (same dose as cycle # 104) + Avastin was on 04/13/2021. CEA 6.0 on 04/12/2021  CT chest 04/20/2021 no evidence of metastatic disease. CT abdomen/pelvis 04/20/2021 No evidence of progressive metastatic disease. Stable 2 cm lesion in the right lobe of the liver, likely representing treated metastatic disease. Imaging reviewed. Continue FOLFIRI + Avastin and repeat scans in 3 months  Cycle # 107 FOLFIRI + Avastin was on 04/27/2021  Cycle # 108 FOLFIRI + Avastin was on 05/11/2021. CEA 6.4 on 05/10/2021. Cycle # 109 FOLFIRI (held Avastin due to upcoming surgery) on 05/25/2021.  CEA 6.3 on 5/24/21  Cycle # 110 FOLFIRI (held Avastin due to upcoming surgery) on 06/08/2021. CEA 6.3 on 6/07/21. CEA 5.8 on 06/28/2021. Right inguinal/scrotol hernia repair on 07/13/2021 with Dr Surekha Pedro with folely removal on 07/23/2021. He developed fever on 07/24/2021 and was brought to [address] [place], [place] [address] ER via EMS; Sepsis secondary to urinary tract infection; Completed Abx  CT chest 08/13/2021 no sign of recurrent or metastatic disease. CT abdomen/pelvis 08/13/2021 unchanged hepatic lesions. Splenomegaly and secondary signs of portal venous hypertension  CEA 4.0 on 08/16/2021  CEA 4.1 on 08/30/2021  Cycle # 111 FOLFIRI was on 08/31/2021. HBP team for evaluation of liver lesions; His lesion does appear to be a hemangioma as his prior metastatic deposits in his liver were hypo attenuating compared to this CT which is hyper attenuating. compared to his prior CT scans he has disappearing liver metastasis. MRI liver on 09/17/2021 No suspicious liver lesion identified. Colonoscopy GI Dr. Royal Jorgensen on 09/20/2021 unremarkable  CEA 3.3 on 09/27/2021. CEA 3.2 on 10/25/2021. CT chest 11/08/2021: Similar chronic appearing findings.  No acute process or evidence of metastatic disease identified. CT Abdomen/pelvis 11/08/2021: No significant change in the appearance of the liver. Similar appearance of splenomegaly. No interval change. CEA 2.7 on 11/22/2021. CEA 2.5 on 12/27/2021  CEA 2.4 on 01/24/2022  CT chest/abdomen/pelvis 02/04/2022 Stable CT of the chest abdomen/ pelvis with the a 1.4 cm enhancing nodule in the right hepatic lobe which is marginally improved   CEA 2.3 on 02/21/2022  BUN 26 Creat 1.5; Increase po fluid intake   Imaging reviewed. Labs reviewed.     RTC 4 weeks with prior labs  MSI testing noted no mismatch repair protein loss of expression    2/22/2022  Brain Aguilera MD  Board Certified Medical Oncologist

## 2022-03-21 ENCOUNTER — HOSPITAL ENCOUNTER (OUTPATIENT)
Age: 72
Discharge: HOME | End: 2022-03-21
Payer: MEDICARE

## 2022-03-21 LAB
ALBUMIN SERPL-MCNC: 3.9 G/DL (ref 3.5–5.2)
ALP BLD-CCNC: 134 U/L (ref 40–129)
ALT SERPL-CCNC: 14 U/L (ref 0–40)
ANION GAP SERPL CALCULATED.3IONS-SCNC: 13 MMOL/L (ref 7–16)
AST SERPL-CCNC: 24 U/L (ref 0–39)
BASOPHILS ABSOLUTE: 0 E9/L (ref 0–0.2)
BASOPHILS RELATIVE PERCENT: 0 % (ref 0–2)
BILIRUB SERPL-MCNC: 0.7 MG/DL (ref 0–1.2)
BUN BLDV-MCNC: 29 MG/DL (ref 6–23)
BURR CELLS: ABNORMAL
CALCIUM SERPL-MCNC: 9.8 MG/DL (ref 8.6–10.2)
CEA: 2.4 NG/ML (ref 0–5.2)
CHLORIDE BLD-SCNC: 102 MMOL/L (ref 98–107)
CO2: 23 MMOL/L (ref 22–29)
CREAT SERPL-MCNC: 1.4 MG/DL (ref 0.7–1.2)
EOSINOPHILS ABSOLUTE: 0.28 E9/L (ref 0.05–0.5)
EOSINOPHILS RELATIVE PERCENT: 3 % (ref 0–6)
GFR AFRICAN AMERICAN: >60
GFR NON-AFRICAN AMERICAN: 50 ML/MIN/1.73
GLUCOSE BLD-MCNC: 110 MG/DL (ref 74–99)
HCT VFR BLD CALC: 32.7 % (ref 37–54)
HEMOGLOBIN: 11 G/DL (ref 12.5–16.5)
LYMPHOCYTES ABSOLUTE: 0.56 E9/L (ref 1.5–4)
LYMPHOCYTES RELATIVE PERCENT: 6 % (ref 20–42)
MCH RBC QN AUTO: 30.6 PG (ref 26–35)
MCHC RBC AUTO-ENTMCNC: 33.6 % (ref 32–34.5)
MCV RBC AUTO: 90.8 FL (ref 80–99.9)
MONOCYTES ABSOLUTE: 0.19 E9/L (ref 0.1–0.95)
MONOCYTES RELATIVE PERCENT: 2 % (ref 2–12)
NEUTROPHILS ABSOLUTE: 8.37 E9/L (ref 1.8–7.3)
NEUTROPHILS RELATIVE PERCENT: 89 % (ref 43–80)
OVALOCYTES: ABNORMAL
PDW BLD-RTO: 13.6 FL (ref 11.5–15)
PLATELET # BLD: 128 E9/L (ref 130–450)
PMV BLD AUTO: 10.3 FL (ref 7–12)
POIKILOCYTES: ABNORMAL
POTASSIUM SERPL-SCNC: 3.6 MMOL/L (ref 3.5–5)
RBC # BLD: 3.6 E12/L (ref 3.8–5.8)
SODIUM BLD-SCNC: 138 MMOL/L (ref 132–146)
TEAR DROP CELLS: ABNORMAL
TOTAL PROTEIN: 7.5 G/DL (ref 6.4–8.3)
WBC # BLD: 9.4 E9/L (ref 4.5–11.5)

## 2022-03-21 PROCEDURE — 80053 COMPREHEN METABOLIC PANEL: CPT

## 2022-03-21 PROCEDURE — 36415 COLL VENOUS BLD VENIPUNCTURE: CPT

## 2022-03-21 PROCEDURE — 85025 COMPLETE CBC W/AUTO DIFF WBC: CPT

## 2022-03-21 PROCEDURE — 82378 CARCINOEMBRYONIC ANTIGEN: CPT

## 2022-03-22 ENCOUNTER — HOSPITAL ENCOUNTER (OUTPATIENT)
Dept: INFUSION THERAPY | Age: 72
Discharge: HOME | End: 2022-03-22
Payer: MEDICARE

## 2022-03-22 ENCOUNTER — OFFICE VISIT (OUTPATIENT)
Dept: ONCOLOGY | Age: 72
End: 2022-03-22
Payer: MEDICARE

## 2022-03-22 VITALS
BODY MASS INDEX: 27.83 KG/M2 | SYSTOLIC BLOOD PRESSURE: 135 MMHG | WEIGHT: 210 LBS | HEART RATE: 64 BPM | HEIGHT: 73 IN | TEMPERATURE: 97.5 F | DIASTOLIC BLOOD PRESSURE: 74 MMHG | OXYGEN SATURATION: 98 %

## 2022-03-22 DIAGNOSIS — C78.7 RECTAL CANCER METASTASIZED TO LIVER (HCC): Primary | ICD-10-CM

## 2022-03-22 DIAGNOSIS — C20 RECTAL CANCER (HCC): Primary | ICD-10-CM

## 2022-03-22 DIAGNOSIS — C20 RECTAL CANCER METASTASIZED TO LIVER (HCC): Primary | ICD-10-CM

## 2022-03-22 PROCEDURE — 99214 OFFICE O/P EST MOD 30 MIN: CPT | Performed by: INTERNAL MEDICINE

## 2022-03-22 PROCEDURE — 2580000003 HC RX 258: Performed by: INTERNAL MEDICINE

## 2022-03-22 PROCEDURE — 99214 OFFICE O/P EST MOD 30 MIN: CPT

## 2022-03-22 PROCEDURE — 96523 IRRIG DRUG DELIVERY DEVICE: CPT

## 2022-03-22 PROCEDURE — 6360000002 HC RX W HCPCS: Performed by: INTERNAL MEDICINE

## 2022-03-22 RX ORDER — SODIUM CHLORIDE 0.9 % (FLUSH) 0.9 %
10 SYRINGE (ML) INJECTION PRN
Status: CANCELLED | OUTPATIENT
Start: 2022-03-22

## 2022-03-22 RX ORDER — SODIUM CHLORIDE 0.9 % (FLUSH) 0.9 %
10 SYRINGE (ML) INJECTION PRN
Status: DISCONTINUED | OUTPATIENT
Start: 2022-03-22 | End: 2022-03-23 | Stop reason: HOSPADM

## 2022-03-22 RX ORDER — HEPARIN SODIUM (PORCINE) LOCK FLUSH IV SOLN 100 UNIT/ML 100 UNIT/ML
500 SOLUTION INTRAVENOUS PRN
Status: CANCELLED | OUTPATIENT
Start: 2022-03-22

## 2022-03-22 RX ORDER — HEPARIN SODIUM (PORCINE) LOCK FLUSH IV SOLN 100 UNIT/ML 100 UNIT/ML
500 SOLUTION INTRAVENOUS PRN
Status: DISCONTINUED | OUTPATIENT
Start: 2022-03-22 | End: 2022-03-23 | Stop reason: HOSPADM

## 2022-03-22 RX ADMIN — Medication 500 UNITS: at 08:25

## 2022-03-22 RX ADMIN — SODIUM CHLORIDE, PRESERVATIVE FREE 10 ML: 5 INJECTION INTRAVENOUS at 08:25

## 2022-03-22 NOTE — PROGRESS NOTES
[place] [id number]           Attending Clinic Note     Reason for Visit: Follow-up on a patient with Metastatic Rectal Cancer.     PCP: Kiran Ash MD     History of Present Illness:  67 y/o  male who was referred to see Dr. Cody Feliz (GI team) for evaluation of bright red blood per rectum, mild anemia and change in bowel habits with diarrhea. CEA 2640 on 10/19/2015. AlcP 299 AST 57 ALT 75 on 10/19/2015. Colonoscopy in 2013 noted no significant polyps, colitis or lesions at that time. Denies any Family History of colorectal cancer or polyps.     Colonoscopy on 10/19/2015 revealed:  1. Ascending polyp, 8 mm, hot snare: Tubulovillous adenoma. 2. Transverse polyp, 1 cm, hot snare: Serrated polyp most consistent with sessile serrated adenoma. 3. Five splenic flexure polyps, three - 5 mm, 7 mm, 8 mm, hot snare and biopsy: Four segments of Tubular Adenoma  4. Descending polyp, 5 mm, biopsy: Tubular adenoma. 5. Sigmoid polyp, 4 mm biopsy, Serrated polyp most consistent with sessile serrated adenoma. 6. Two rectal polyps, 4 mm biopsy: Serrated polyp most consistent with hyperplastic polyp. 7. Rectosigmoid colon mass (large mass approximately 65% circumference of the lumen; Very friable, firm and hard): Tubulovillous adenoma with associated focal erosion and fibroplasia.      CT scan abdomen/pelvis on 10/26/2015:  1. Small nodules at lung bases likely represent metastatic colon   cancer. 2. Extensive likely metastatic colon cancer throughout the liver.      3. Mild mural thickening and luminal narrowing in the   terminal ileum, otherwise nonspecific.      Colonoscopy with snare removal rectal mass was performed by Dr. Jorge Varela. Pathology proved:  Rectal polyp: Invasive adenocarcinoma involving villous adenoma and extending to the cauterized edge of excision. KRAS Mutation: Mutation detected.   BRAF Mutation: Mutation not detected. 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     CEA 3488. Bone scan on 11/10/2015 noted no metastatic disease. CT chest on 11/10/2015 revealed Multiple pulmonary nodules in the upper and lower lobes consistent with metastatic disease;   Hepatic metastasis also visualized. 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 Mediport was placed. Cycle # 1 of FOLFOX + Avastin was on 11/23/2015. CEA was 4555 on 11/23/2015. Cycle # 2 of FOLFOX + Avastin was on 12/08/2015. CEA was 3160 on 12/08/2015. Cycle # 3 of FOLFOX + Avastin was on 12/22/2015. CEA was 2516 on 12/21/2015. Cycle # 4 of FOLFOX + Avastin was on 01/05/2016. CEA was 2098 on 01/05/2015. Cycle # 5 of FOLFOX + Avastin was on 01/19/2016. CEA was 1511 on 01/05/2015.     -Bone scan on 01/26/2016 noted no metastatic disease. CT chest on 01/26/2016 revealed Significant response to treatment with no visible residual nodules. CT scan abdomen/pelvis on 01/26/2016 noted Interval decreased size of multiple masses in the liver compatible with treatment response. Continue another 2 months of FOLFOX + Avastin and repeat scans. Cycle # 6 of FOLFOX + Avastin was on 02/02/2016.  on 02/02/2016. Cycle # 7 of FOLFOX + Avastin was on 02/16/2016.  on 02/16/2016. Cycle # 8 of FOLFOX + Avastin was on 03/01/2016.  on 03/01/2016. Cycle # 9 of FOLFOX + Avastin was on 03/15/2016.  on 03/15/2016. Cycle # 10 of FOLFOX + Avastin was on 03/29/2016. .4 on 03/29/2016. Cycle # 11 of FOLFOX + Avastin was on 04/12/2016. .4 on 04/12/2016.     Re-staging scans on 04/19/2016: CT Chest: clear lungs; no evidence of recurring pulmonary nodule; CT Abdomen/Pelvis: Further interval decrease in size of the multiple metastatic hepatic lesions, the largest lesion now measures 3.9 x 3.5 cm and previously measured 4.5 cm in maximum diameter.  No mesenteric lymphadenopathy is identified; Bone Scan: No evidence of osseous metastasis. Continue same regimen and re-stage in 2-3 months. Cycle # 12 FOLFOX + Avastin was on 04/26/2016. .5 on 04/26/2016. Cycle # 13 FOLFOX + Avastin was on 05/10/2016. .3 on 05/10/2016. Cycle # 14 FOLFOX+AVASTIN was on 05/24/2016. .5 on 05/24/2016. Cycle # 15 FOLFOX + Avastin was on 06/07/2016. CEA 90.5 on 06/07/2016. Admitted to [facility] 06/13/2016-06/16/2016 for abdominal pain: EGD noted 1.5 cm clean based duodenal bulb ulceration s/p epinephrine and bicap per Dr. Hiro Pino. No active bleeding. A. Stomach, biopsy: Mild chronic gastritis, immunostain negative for Helicobacter  B. Esophagus, biopsy: Gastric glandular mucosa with prominent intestinal metaplasia (Mad[place]'s epithelium), negative for epithelial dysplasia, esophageal squamous mucosa not identified  Cycle # 16 FOLFOX (discontinued avastin (bevacizumab) given association of peptic ulcer disease and known association of GI perforation) was on 06/21/2016. Cycle # 17 FOLFOX was on 07/05/2016. CEA 52.6 on 07/19/2016. Cycle # 17 FOLFOX was on 07/05/2016. CEA 52.6 on 07/05/2016. CEA 47.6 on 07/19/2016.     Re-staging scans 07/19/2106: CT Chest negative for metastatic disease. CT Abdomen/Pelvis: Stable hepatic lesions; Question new mural thickening in the cecum. Bone Scan: New Let hip lesion suspicious for bone metastasis.     Increased CEA; new mural thickening in the cecum and new left hip lesion suspicious for bone metastasis are consistent with disease progression; He derived maximum benefit from FOLFOX/AVASTIN. D/C FOLFOX/AVASTIN. We recommended FOLFIRI second line therapy. Cycle # 1 FOLFIRI was on 08/02/2016. CEA 40.8 on 08/02/2016. Xgeva q4 weeks started on 08/02/2016. Cycle # 2 FOLFIRI was on 08/16/2016. CEA 31.7 on 08/16/2016. Cycle # 3 FOLFIRI (added Avastin) was on 08/30/2016 given that ulcers healed on EGD 08/15/2016 by Dr. Jace Zepeda; Protonix bid since avastin re-started.    Colonoscopy on 08/29/2016 (to look at the cecal area) unremarkable. CEA 26.7 on 08/30/2016. Cycle # 4 FOLFIRI/Avastin was on 09/13/2016. CEA 23.4 on 09/13/2016. Cycle # 5 FOLFIRI/Avastin was on 09/27/2016. CEA 22.3 on 09/27/2016. Cycle # 6 FOLFIRI/Avastin was on 10/11/2016. CEA 18.4 on 10/11/2016.      Bone scan 10/21/2016 stable bone metastasis; ? New lesion anterior left sixth rib likely interval healing fracture. CT abdomen/pelvis revealed stable hepatic metastasis. CT chest negative for metastatic disease. Continue FOLFIRI/Avastin and repeat scans in 3 months. Cycle # 7 FOLFIRI/Avastin was on 10/25/2016. CEA 16.1  Cycle # 8 FOLFIRI/Avastin was on 11/08/2016. CEA 15.7  Cycle # 9 FOLFIRI/Avastin was on 11/29/2016. CEA 13.6 on 11/29/2016. Cycle # 10 FOLFIRI/Avastin was on 12/13/2016. CEA 10.6 on 12/13/2016. Cycle # 11 FOLFIRI/Avastin was on 12/27/2016. CEA 11.1 on 12/27/2016. Cycle # 12 FOLFIRI/Avastin was on 01/10/2017. CEA 9.7 on 01/10/2017.       CT chest 01/23/2017 No new pulmonary nodule or lymphadenopathy. Patchy groundglass opacities within the left lower lobe, suggestive of infectious or inflammatory etiology. Followup CT chest in 3 months. Slight increased linear sclerosis along the left anterior sixth rib corresponding to an area of increased uptake on the prior bone scan from 10/21/2016, favored to be related to interval healing changes of a nondisplaced fracture. CT abdomen/pelvis on 01/23/2017 Redemonstration of multiple hepatic metastases, which are similar compared to the prior CT from 10/21/2016. Redemonstration of area of increased sclerosis along the right acetabulum suspicious for metastatic disease. Bone scan on 01/23/2017 Stable subtle uptake within the left proximal diaphysis, again suggestive of metastatic disease  Decreased subtle uptake within the medial right acetabulum.    Decreased uptake within the left anterior sixth rib corresponding to linear sclerosis on the recent CT, favored to be related to an joint rib fracture. Continue FOLFIRI + Avastin and repeat scans in 3 months. Cycle # 13 FOLFIRI + Avastin was on 01/24/2017. Cycle # 14 FOLFIRI + Avastin was on 02/07/2017. Cycle # 15 FOLFIRI + Avastin was on 02/21/2017. Cycle # 16 FOLFIRI + Avastin was on 03/07/2017. Cycle # 17 FOLFIRI + Avastin was on 03/21/2017. Cycle # 18 FOLFIRI + Avastin was on 04/04/2017. CT chest 04/17/2017 negative for metastatic disease. CT abdomen/pelvis 04/17/2017 Stable hypodense metastatic lesions within the liver. Stable area of increased sclerosis along the right acetabulum  Bone scan 04/17/2017 Stable subtle uptake within the left proximal femoral diaphysis; No definite abnormal uptake in the region of a sclerotic lesion along the posterior column of the right acetabulum noted on CT. Stable slight uptake within the left anterior sixth rib corresponding to linear sclerosis on the recent CT, favored to be related to a fracture. Continue FOLFIRI + Avastin and repeat scans in 3 months. Cycle # 19 FOLFIRI + Avastin was on 04/18/2017. Cycle # 20 FOLFIRI + Avastin was on 05/02/2017. Cycle # 21 FOLFIRI + Avastin was on 05/16/2017. Cycle # 22 FOLFIRI + Avastin was on 05/30/2017. Cycle # 23 FOLFIRI + Avastin was on 06/13/2017. Cycle # 24 FOLFIRI + Avastin was on 06/27/2017. Cycle # 25 FOLFIRI + Avastin was on 07/11/2017. Cycle # 26 FOLFIRI + Avastin was on 07/25/2017. Cycle # 27 FOLFIRI + Avastin was on 08/08/2017. Cycle # 28 FOLFIRI + Avastin was on 08/22/2017. Cycle # 29 FOLFIRI + Avastin was on 09/05/2017. Cycle # 30 FOLFIRI + Avastin was on 09/19/2017. Bone scan 09/26/2017 stable. CT abdomen/pelvis on 09/26/2017 Stable hypodense mass in the liver. Stable sclerotic lesion in the acetabulum. CT chest 09/26/2017 negative for metastatic disease. Cycle # 31 FOLFIRI + Avastin was on 10/03/2017. CEA 7.4 on 10/03/2017. Cycle # 32 FOLFIRI + Avastin was on 10/17/2017.  CEA 6.0 on 10/17/2017. Cycle # 33 FOLFIRI + Avastin was on 10/31/2017. CEA 6.8 on 10/31/2017. Cycle # 34 FOLFIRI + Avastin was on 11/14/2017. CEA 7.2 on 11/14/2017. Cycle # 35 FOLFIRI + Avastin was on 11/28/2017. CEA 5.1 on 11/28/2017. Cycle # 36 FOLFIRI + Avastin was on 12/12/2017. CEA 6.1 on 12/12/2017. Bone scan 12/22/2017 noted no evidence of metastatic disease to the axial or appendicular skeleton. CT chest 12/22/2017 noted no evidence of metastatic disease. CT abdomen/pelvis 12/22/2017 noted stable hypodense lesions in the liver. Continue FOLFIRI + Avastin and repeat scans in 3 months. Cycle # 37 FOLFIRI + Avastin was on 12/27/2018. Cycle # 38 FOLFIRI + Avastin was on 01/09/2018. Cycle # 39 FOLFIRI + Avastin was on 01/23/2018. Cycle # 40 FOLFIRI + Avastin was on 02/06/2018. Cycle # 41 FOLFIRI + Avastin was on 02/20/2018. Cycle # 42 FOLFIRI + Avastin was on 03/06/2018. Cycle # 43 FOLFIRI + Avastin was on 03/20/2018. Bone scan on 03/26/2018 negative for metastatic disease. CT chest 03/26/2018 noted ? new 7 mm nodule in LUCY. CT abdomen/pelvis 03/26/2018 noted stable hypodense lesions in the liver. ? New small ascites in the abdomen. Patient wants to continue to monitor the lung finding and repeat scans in 2 months instead of changing the current regimen into oral Lonsurf. Cycle # 44 FOLFIRI + Avastin was on 04/03/2018. CEA 6.3 on 04/03/2018. Cycle # 45 FOLFIRI + Avastin was on 04/24/2018. CEA 5.3 on 04/24/2018. Cycle # 46 FOLFIRI + Avastin was on 05/08/2018. CEA 5.4 on 05/08/2018. Cycle # 47 FOLFIRI + Avastin was on 05/22/2018. CEA 6.1 on 05/22/2018. CT chest 05/31/2018 noted stable nodule in LUCY. No evidence of worsening malignancy. CT abdomen/pelvis on 05/31/2018 noted stable liver metastasis. No evidence of worsening malignancy. Continue FOLFIRI + Avastin and repeat scans in 3 months. Cycle # 48 FOLFIRI + Avastin was on 06/06/2018. Cycle # 49 FOLFIRI + Avastin was on 06/19/2018. Cycle # 50 FOLFIRI + Avastin was on 07/03/2018. Cycle # 51 FOLFIRI + Avastin was on 07/24/2018. Cycle # 52 FOLFIRI + Avastin was on 08/14/2018. Cycle # 53 FOLFIRI + Avastin was on 08/28/2018. CT chest 09/06/2018 noted interval decrease in size of LUCY measuring up to 4 mm, suggestive of treatment response. No mediastinal or hilar LN  CT abdomen/pelvis 09/06/2018 Slight interval increase in size of a previously noted metastatic lesion within the right hepatic lobe. This may be related to differences in phase of enhancement compared to the most recent study from 5/31/2018, the lesion remains decreased in size compared to the more previous studies. No abdominal, retroperitoneal, or pelvic lymphadenopathy. Continue FOLFIRI + Avastin and repeat scans in 2 months. Cycle # 54 FOLFIRI + Avastin was on 09/11/2018. Cycle # 55 FOLFIRI + Avastin was on 09/25/2018. Cycle # 56 FOLFIRI + Avastin was on 10/09/2018. Cycle # 57 FOLFIRI + Avastin was on 10/23/2018. CT chest 11/02/2018 stable 3 mm nodule within LUCY. No new right and left lung nodule. No mediastinal or osseous lesion. CT abdomen/pelvis 11/02/2018 stable metastatic disease to liver. No new metastatic disease identified. No mesenteric or retroperitoneal LN. No gross colonic lesion identified. Continue FOLFIRI + Avastin and repeat scans in 3 months. Cycle # 58 FOLFIRI + Avastin was on 11/06/2018. CEA 7.6 on 11/05/2018. Cycle # 59 FOLFIRI + Avastin was on 11/27/2018. CEA 6.9 on 11/26/2018. Cycle # 60 FOLFIRI + Avastin was on 12/11/2018. CEA 6.7 on 12/11/2018. Cycle # 61 FOLFIRI + Avastin was on 01/08/2019. CEA 5.6 on 01/07/2019. Cycle # 62 FOLFIRI + Avastin was on 01/29/2019. CEA 4.6 on 01/28/2019. Cycle # 63 FOLFIRI + Avastin was on 02/12/2019. CEA 4.3 on 02/11/2019. CT chest 02/21/2019 no evidence of metastatic disease. CT abdomen/pelvis 02/21/2019 stable hypodense liver lesions. No evidence of worsening metastatic disease.  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 Ordered MRI thoracic spine and referred to neurosurgery team.    Cycle # 64 FOLFIRI + Avastin was on 02/26/2019. CEA 4.7 on 02/25/2019. Cycle # 65 FOLFIRI + Avastin was on 03/12/2019. CEA 4.0 on 03/11/2019. Cycle # 66 FOLFIRI + Avastin was on 03/26/2019. CEA 4.7 on 03/25/2019. Cycle # 67 FOLFIRI + Avastin was on 04/09/2019. CEA 5.6 on 04/08/2019. Cycle # 68 FOLFIRI + Avastin was on 04/30/2019. CEA 4.9 on 04/29/2019. Cycle # 69 FOLFIRI + Avastin was on 05/14/2019. CEA 5.3 on 05/14/2019. CT chest 05/23/2019 stable small lung nodule with no evidence of metastatic disease. CT abdomen/pelvis 05/23/2019 Decrease conspicuity of the liver lesions. No new lesions seen. Stable splenomegaly. Continue FOLFIRI + Avastin and repeat scans in 3 months. Cycle # 70 FOLFIRI + Avastin was on 06/04/2019. CEA 4.8 on 06/03/2019. Cycle # 71 FOLFIRI + Avastin was on 06/18/2019. CEA 4.6 on 06/17/2019. Cycle # 72 FOLFIRI + Avastin was on 07/09/2019. CEA 4.3 on 07/08/2019. Cycle # 73 FOLFIRI + Avastin was on 07/30/2019. CEA 5.0 on 07/29/2019. Cycle # 74 FOLFIRI + Avastin was on 08/13/2019. CEA 5.0 on 08/12/2019. CT chest 08/20/2019 negative  CT abdomen/pelvis 08/20/2019 Previous identified hepatic lesions are not visualized on the current exam.  Continue FOLFIRI + Avastin and repeat scans in 3 months. Cycle # 75 FOLFIRI + Avastin was on 08/27/2019. CEA 5.0 on 08/26/2019. Cycle # 76 FOLFIRI + Avastin was on 09/17/2019. CEA 5.5 on 09/16/2019. Cycle # 77 FOLFIRI + Avastin was on 10/15/2019. CEA 4.6 on 10/15/2019. Cycle # 78 FOLFIRI + Avastin was on 10/29/2019. CEA 4.1 on 10/28/2019. Cycle # 79 FOLFIRI + Avastin was on 11/12/2019. CEA 4.3 on 11/12/2019. Cycle # 80 FOLFIRI + Avastin was on 12/10/2019. CEA 4.0 on 12/09/2019.   CT chest 12/19/2019 Stable 3 mm nodule within the left upper lobe, similar to the previous studies dating back to 11/2/2018, however decreased in size compared to the CT from 3/26/2018. No thoracic LN. CT abdomen/pelvis 12/19/2019 Previously described small hypodense lesions are more conspicuous on the current study compared to the recent study throughout the liver from 8/20/2019, however similar to the prior study from 2/21/2019. Findings may be related to differences in contrast opacification. No abdominal or pelvic lymphadenopathy. Continue FOLFIRI + Avastin and repeat scans in 2 months to f/u on liver lesions. Cycle # 81 FOLFIRI + Avastin was on 01/07/2020. CEA 3.8 on 01/06/2020. Cycle # 82 FOLFIRI + Avastin was on 01/21/2020. CEA 3.7 on 01/20/2020. Cycle # 83 FOLFIRI + Avastin was on 02/04/2020. CEA 4.1 on 02/03/2020. Cycle # 84 FOLFIRI + Avastin was on 02/18/2020. CEA 4.6 on 02/17/2020. EGD by Dr. Letty Muhammad 03/02/2020 showing no masses or lesions  Cycle # 85 FOLFIRI + Avastin was on 03/03/2020. CEA 7.4 on 03/02/2020. Cycle # 86 FOLFIRI + Avastin was on 03/17/2020. CEA 4.5 on 03/16/2020. Colonoscopy on 03/23/2020 by Dr. Letty Muhammad unremarkable (records requested). Cycle # 87 FOLFIRI + Avastin was on 03/31/2020. CEA 4.1 on 03/30/2020. Cycle # 88 FOLFIRI + Avastin was on 04/21/2020. CEA 6.4 on 04/20/2020. Cycle # 89 FOLFIRI + Avastin was on 05/05/2020. CEA 5.8 on 05/04/2020. Cycle # 90 FOLFIRI + Avastin was on 06/02/2020. CEA 5.5 on 06/01/2020. Cycle # 91 FOLFIRI + Avastin was on 06/16/2020. CEA 5.6 on 06/15/2020. CT chest 06/23/2020 noted no metastatic disease in the chest.   CT abdomen/pelvis 06/23/2020 Stable small liver lesions measuring up to 5 mm since 2019.   22 mm round mass in segment 8 of the liver with central high density stable from December 2019), previously measuring up to 34 mm in 2017. No additional metastatic disease in the abdomen or pelvis. Small amount of ascites. Overall stable disease. Continue FOLFIRI + Avastin and repeat scans in 2-3 months. Cycle # 92 FOLFIRI + Avastin was on 07/07/2020.  CEA 5.7 on 07/06/2020. Cycle # 93 FOLFIRI + Avastin was on 07/21/2020. CEA 5.9 on 07/20/2020. Cycle # 94 FOLFIRI + Avastin was on 08/04/2020. CEA 5.5 on 08/04/2020. Cycle # 95 FOLFIRI + Avastin was on 08/25/2020. CEA 6.1 on 08/24/2020. CT chest 9/2/2020 stable unremarkable enhanced CT of the thorax. There is no metastatic disease to the lungs. CT abdomen pelvis on 9/2/2020 stable 2.2 cm low attenuated lesion within the central aspect of the right hepatic lobe favored to represent treated metastatic disease. No new hepatic lesion is identified. Stable splenomegaly  There is no appreciable colonic lesion or stricture  Pericholecystic fluid of uncertain etiology. General surgery team consulted. Laparoscopic cholecystectomy with normal cholangiogram 10/27/20  Gallbladder: Chronic cholecystitis and cholelithiasis.  Unremarkable regional lymph node. 11/13/2020; Seen by Dr. Mya Perry from General surgery team; cleared to re-start chemotherapy. Cycle # 96 FOLFIRI + Avastin was on 11/17/2020. CEA 3.6 on 11/16/2020. Cycle # 97 FOLFIRI + Avastin was on 12/01/2020. CEA 3.5 on 11/30/2020. Cycle # 98 FOLFIRI + Avastin was on 12/15/2020. CEA 4.3 on 12/15/2020. Cycle # 99 FOLFIRI + Avastin was on 01/05/2021. CEA 4.7 on 01/04/2021. CT chest 01/13/2021 negative for metastatic disease. CT abdomen/pelvis 01/13/2021 Unchanged central hepatic lesion. No specific signs of abdominopelvic metastatic disease. Continue FOLFIRI + Avastin and repeat scans in 3 months. Cycle # 100 FOLFIRI + Avastin was on 01/19/2021. CEA 4.7 on 01/18/2021. Cycle # 101 FOLFIRI + Avastin was on 02/02/2021. CEA 5.2 on 02/01/2021. Cycle # 102 FOLFIRI + Avastin was on 02/16/2021. CEA 5.6 on 02/15/2021. Cycle # 103 FOLFIRI + Avastin was on 03/02/2021. Cycle # 104 FOLFIRI (20% dose reduced due to significant toxicity) + Avastin was on 03/16/2021. Cycle # 105 FOLFIRI (same dose as cycle # 104) + Avastin was on 03/30/2021.  CEA 6.5 on 03/29/2021. Cycle # 106 FOLFIRI (same dose as cycle # 104) + Avastin was on 04/13/2021. CEA 6.0 on 04/12/2021  CT chest 04/20/2021 no evidence of metastatic disease. CT abdomen/pelvis 04/20/2021 No evidence of progressive metastatic disease. Stable 2 cm lesion in the right lobe of the liver, likely representing treated metastatic disease. Imaging reviewed. Continue FOLFIRI + Avastin and repeat scans in 3 months  Cycle # 107 FOLFIRI + Avastin was on 04/27/2021  Cycle # 108 FOLFIRI + Avastin was on 05/11/2021. Cycle # 109 FOLFIRI (held Avastin due to upcoming surgery) on 05/25/2021. CEA 6.3 on 5/24/21  Cycle # 110 FOLFIRI (held Avastin due to upcoming surgery) on 06/08/2021. CEA 6.3 on 6/07/21. Right inguinal/scrotol hernia repair on 07/13/2021 with Dr Sheila Marquez with folely removal on Friday 07/23/2021. He developed fever on 07/24/2021 and was brought to [address] [place], [place] [address] ER via EMS; Sepsis secondary to urinary tract infection   Completed Abx  CEA 4.1 on 08/30/2021  Cycle # 111 FOLFIRI was on 08/31/2021. HBP team for evaluation of liver lesions; His lesion does appear to be a hemangioma as his prior metastatic deposits in his liver were hypo attenuating compared to this CT which is hyper attenuating. compared to his prior CT scans he has disappearing liver metastasis. MRI liver on 09/17/2021 No suspicious liver lesion identified. Colonoscopy GI Dr. Danny Donaldson on 09/20/2021 unremarkable  CEA 3.3 on 09/27/2021. CEA 3.2 on 10/25/2021. CEA 2.7 on 11/22/2021. CEA 2.5 on 12/27/2021. CEA 2.4 on 01/24/2022. CEA 2.3 on 02/21/2022. CEA 2.4 on 03/21/2022. Today 03/22/2022 for f/u. No fever, chills. No nausea/vomiting. Fair appetite and energy level. + arthritis neuropathy      Review of Systems;  CONSTITUTIONAL: No fever, chills. Fair appetite and energy level  ENMT: Eyes: No diplopia; Nose: No epistaxis. Mouth:No lesions  RESPIRATORY: No hemoptysis, shortness of breath.    CARDIOVASCULAR: No chest pain, wild type. CT scan abdomen/pelvis on 10/26/2015 revealed small nodules at the lung bases, extensive liver lesions consistent with metastatic rectosigmoid cancer. CEA 3488 on 10/30/2015. Bone scan on 11/10/2015 noted no metastatic disease. CT chest on 11/10/2015 revealed Multiple pulmonary nodules in the upper and lower lobes consistent with metastatic disease; Hepatic metastasis also visualized. 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 Mediport was placed. Cycle # 1 of FOLFOX + Avastin was on 11/23/2015. CEA was 4555 on 11/23/2015. Cycle # 2 of FOLFOX + Avastin was on 12/08/2015. CEA was 3160 on 12/08/2015. Cycle # 3 of FOLFOX + Avastin was on 12/22/2015. CEA was 2516 on 12/21/2015. Cycle # 4 of FOLFOX + Avastin was on 01/05/2016. CEA was 2098 on 01/05/2015. Cycle # 5 of FOLFOX + Avastin was on 01/19/2016. CEA was 1511 on 01/05/2015.     -Bone scan on 01/26/2016 noted no metastatic disease. CT chest on 01/26/2016 revealed Significant response to treatment with no visible residual nodules. CT scan abdomen/pelvis on 01/26/2016 noted Interval decreased size of multiple masses in the liver compatible with treatment response. Continue another 2 months of FOLFOX + Avastin and repeat scans. Cycle # 6 of FOLFOX + Avastin was on 02/02/2016.  on 02/02/2016. Cycle # 7 of FOLFOX + Avastin was on 02/16/2016.  on 02/16/2016. Cycle # 8 of FOLFOX + Avastin was on 03/01/2016.  on 03/01/2016. Cycle # 9 of FOLFOX + Avastin was on 03/15/2016.  on 03/15/2016. Cycle # 10 of FOLFOX + Avastin was on 03/29/2016. .4 on 03/29/2016. Cycle # 11 of FOLFOX + Avastin was on 04/12/2016.  .4 on 04/12/2016.     Re-staging scans on 04/19/2016: CT Chest: clear lungs; no evidence of recurring pulmonary nodule; CT Abdomen/Pelvis: Further interval decrease in size of the multiple metastatic hepatic lesions, the largest lesion now measures 3.9 x 3.5 cm and previously measured 4.5 cm in maximum diameter. No mesenteric lymphadenopathy is identified; Bone Scan: No evidence of osseous metastasis. Continue same regimen and re-stage in 2-3 months. Cycle # 12 FOLFOX + Avastin was on 04/26/2016. .5 on 04/26/2016. Cycle # 13 FOLFOX + Avastin was on 05/10/2016. .3 on 05/10/2016. Cycle # 14 FOLFOX+AVASTIN was on 05/24/2016. .5 on 05/24/2016. Cycle # 15 FOLFOX + Avastin was on 06/07/2016. CEA 90.5 on 06/07/2016. Admitted to [facility] 06/13/2016-06/16/2016 for abdominal pain: EGD noted 1.5 cm clean based duodenal bulb ulceration s/p epinephrine and bicap per Dr. Samantha Wells. No active bleeding. A. Stomach, biopsy: Mild chronic gastritis, immunostain negative for Helicobacter  B. Esophagus, biopsy: Gastric glandular mucosa with prominent ntestinal metaplasia (Madrid's epithelium), negative for epithelial dysplasia, esophageal squamous mucosa not identified     Cycle # 16 FOLFOX (discontinued avastin (bevacizumab) given association of peptic ulcer disease and known association of GI perforation.) was on 06/21/2016. CEA 47 on 06/21/2016. Cycle # 17 FOLFOX was on 07/05/2016. CEA 52.6 on 07/05/2016. CEA 47.6 on 07/19/2016.     Re-staging scans 07/19/2106: CT Chest negative for metastatic disease. CT Abdomen/Pelvis: Stable hepatic lesions; Question new mural thickening in the cecum. Bone Scan: New Let hip lesion suspicious for bone metastasis.     Increased CEA; new mural thickening in the cecum and new left hip lesion suspicious for bone metastasis are consistent with disease progression; He derived maximum benefit from FOLFOX/AVASTIN. D/C FOLFOX/AVASTIN. We recommended FOLFIRI second line therapy. Cycle # 1 FOLFIRI was on 08/02/2016. CEA 40.8 on 08/02/2016. Xgeva q4 weeks started on 08/02/2016. Cycle # 2 FOLFIRI was on 08/16/2016. CEA 31.7 on 08/16/2016.   Cycle # 3 FOLFIRI (added Avastin) was on 08/30/2016 given that ulcers healed on EGD 08/15/2016 by Dr. Rebel Sosa; Protonix bid since avastin re-started. Colonoscopy on 08/29/2016 (to look at the cecal area) unremarkable. CEA 26.7 on 08/30/2016. Cycle # 4 FOLFIRI/Avastin was on 09/13/2016. CEA 23.4 on 09/13/2016. Cycle # 5 FOLFIRI/Avastin was on 09/27/2016. CEA 22.3 on 09/27/2016. Cycle # 6 FOLFIRI/Avastin was on 10/11/2016. CEA 18.4 on 10/11/2016.      Bone scan 10/21/2016 stable bone metastasis; ? New lesion anterior left sixth rib likely interval healing fracture. CT abdomen/pelvis revealed stable hepatic metastasis. CT chest negative for metastatic disease. Continue FOLFIRI/Avastin and repeat scans in 3 months. Cycle # 7 FOLFIRI/Avastin was on 10/25/2016. CEA 16.1 on 10/25/2016. Cycle # 8 FOLFIRI/Avastin was on 11/08/2016. CEA 15.7 on 11/08/2016. Cycle # 9 FOLFIRI/Avastin was on 11/29/2016. CEA 13.6 on 11/29/2016. Cycle # 10 FOLFIRI/Avastin was on 12/13/2016. CEA 10.6 on 12/13/2016. Cycle # 11 FOLFIRI/Avastin was on 12/27/2016. CEA 11.1 on 12/27/2016. Cycle # 12 FOLFIRI/Avastin was on 01/10/2017. CEA 9.7 on 01/10/2017. CT chest 01/23/2017 No new pulmonary nodule or lymphadenopathy. Patchy groundglass opacities within the left lower lobe, suggestive of infectious or inflammatory etiology. Followup CT chest in 3 months. Slight increased linear sclerosis along the left anterior sixth rib corresponding to an area of increased uptake on the prior bone scan from 10/21/2016, favored to be related to interval healing changes of a nondisplaced fracture. CT abdomen/pelvis on 01/23/2017 Redemonstration of multiple hepatic metastases, which are similar compared to the prior CT from 10/21/2016. Redemonstration of area of increased sclerosis along the right acetabulum suspicious for metastatic disease.    Bone scan on 01/23/2017 Stable subtle uptake within the left proximal diaphysis, again suggestive of metastatic disease  Decreased subtle uptake within the medial right acetabulum. Decreased uptake within the left anterior sixth rib corresponding to linear sclerosis on the recent CT, favored to be related to an joint rib fracture. Continue FOLFIRI + Avastin and repeat scans in 3 months. Cycle # 13 FOLFIRI + Avastin was on 01/24/2017. Cycle # 14 FOLFIRI + Avastin was on 02/07/2017. Cycle # 15 FOLFIRI + Avastin was on 02/21/2017. Cycle # 16 FOLFIRI + Avastin was on 03/07/2017. Cycle # 17 FOLFIRI + Avastin was on 03/21/2017. Cycle # 18 FOLFIRI + Avastin was on 04/04/2017. CT chest 04/17/2017 negative for metastatic disease. CT abdomen/pelvis 04/17/2017 Stable hypodense metastatic lesions within the liver. Stable area of increased sclerosis along the right acetabulum  Bone scan 04/17/2017 Stable subtle uptake within the left proximal femoral diaphysis; No definite abnormal uptake in the region of a sclerotic lesion along the posterior column of the right acetabulum noted on CT. Stable slight uptake within the left anterior sixth rib corresponding to linear sclerosis on the recent CT, favored to be related to a fracture. Continue FOLFIRI + Avastin and repeat scans in 3 months. Cycle # 19 FOLFIRI + Avastin was on 04/18/2017. CEA 7.5 on 04/18/2017. Cycle # 20 FOLFIRI + Avastin was on 05/02/2017. CEA 7.7 on 05/02/2017. Cycle # 21 FOLFIRI + Avastin was on 05/16/2017. CEA 7.1 on 05/16/2017. Cycle # 22 FOLFIRI + Avastin was on 05/30/2017. CEA 8.2 on 05/30/2017. Cycle # 23 FOLFIRI + Avastin was on 06/13/2017. CEA 7.7 on 06/13/2017. Cycle # 24 FOLFIRI + Avastin was on 06/27/2017. CEA 6.6 on 06/27/2017. Re-staging scans 07/05/2017:  Bone scan stable proximal left femoral diaphysis, right acetabulum, and left anterior sixth rib lesions;  CT abdomen/pelvis stable hypodense metastatic lesions within the liver; CT chest without convincing evidence of metastatic disease. Cycle # 25 FOLFIRI + Avastin was on 07/11/2017. CEA 6.3 on 07/11/2017.   Cycle # 26 FOLFIRI + Avastin was on 07/25/2017. CEA 7.3 on 07/25/2017. Cycle # 27 FOLFIRI + Avastin was on 08/08/2017. CEA 6.5 on 08/08/2017. Cycle # 28 FOLFIRI + Avastin was on 08/22/2017. CEA 5.9 on 08/22/2017. Cycle # 29 FOLFIRI + Avastin was on 09/05/2017. CEA 6.3 on 09/05/2017. Cycle # 30 FOLFIRI + Avastin was on 09/19/2017. CEA 6.3 on 09/19/2017. Bone scan 09/26/2017 stable. CT abdomen/pelvis on 09/26/2017 Stable hypodense mass in the liver. Stable sclerotic lesion in the acetabulum. CT chest 09/26/2017 negative for metastatic disease. Cycle # 31 FOLFIRI + Avastin was on 10/03/2017. CEA 7.4 on 10/03/2017. Cycle # 32 FOLFIRI + Avastin was on 10/17/2017. CEA 6.0 on 10/17/2017. Cycle # 33 FOLFIRI + Avastin was on 10/31/2017. CEA 6.8 on 10/31/2017. Cycle # 34 FOLFIRI + Avastin was on 11/14/2017. CEA 7.2 on 11/14/2017. Cycle # 35 FOLFIRI + Avastin was on 11/28/2017. CEA 5.1 on 11/28/2017. Cycle # 36 FOLFIRI + Avastin was on 12/12/2017. CEA 6.1 on 12/12/2017. Bone scan 12/22/2017 noted no evidence of metastatic disease to the axial or appendicular skeleton. CT chest 12/22/2017 noted no evidence of metastatic disease. CT abdomen/pelvis 12/22/2017 noted stable hypodense lesions in the liver. Continue FOLFIRI + Avastin and repeat scans in 3 months. Cycle # 37 FOLFIRI + Avastin was on 12/27/2018. CEA 6.7 on 12/27/2017. Cycle # 38 FOLFIRI + Avastin was on 01/09/2018. CEA 5.0 on 01/09/2018. Cycle # 39 FOLFIRI + Avastin was on 01/23/2018. CEA 6.5 on 01/23/2018. Cycle # 40 FOLFIRI + Avastin was on 02/06/2018. CEA 6.9 on 02/06/2018. Cycle # 41 FOLFIRI + Avastin was on 02/20/2018. CEA 6.4 on 02/20/2018. Cycle # 42 FOLFIRI + Avastin was on 03/06/2018. CEA 6.6 on 03/06/2018. Cycle # 43 FOLFIRI + Avastin was on 03/20/2018. CEA 5.7 on 03/20/2018. Bone scan on 03/26/2018 negative for metastatic disease. CT chest 03/26/2018 noted ? new 7 mm nodule in LUCY.    CT abdomen/pelvis 03/26/2018 noted stable hypodense lesions in the liver. ? New small ascites in the abdomen. Patient wants to continue to monitor the lung finding and repeat scans in 2 months instead of changing the current chemotherapy regimen to oral Lonsurf. Cycle # 44 FOLFIRI + Avastin was on 04/03/2018. CEA 6.3 on 04/03/2018. Cycle # 45 FOLFIRI + Avastin was on 04/24/2018. CEA 5.3 on 04/24/2018. Cycle # 46 FOLFIRI + Avastin was on 05/08/2018. CEA 5.4 on 05/08/2018. Cycle # 47 FOLFIRI + Avastin was on 05/22/2018. CEA 6.1 on 05/22/2018. CT chest 05/31/2018 noted stable nodule in LUCY. No evidence of worsening malignancy. CT abdomen/pelvis on 05/31/2018 noted stable liver metastasis. No evidence of worsening malignancy. Continue FOLFIRI + Avastin and repeat scans in 3 months. Cycle # 48 FOLFIRI + Avastin was on 06/06/2018. CEA 6.3 on 06/05/2018. Cycle # 49 FOLFIRI + Avastin was on 06/19/2018. CEA 6.1 on 06/19/2018. Cycle # 50 FOLFIRI + Avastin was on 07/03/2018. CEA 7.4 on 07/03/2018. Cycle # 51 FOLFIRI + Avastin was on 07/24/2018. CEA 6.7 on 07/24/2018. Cycle # 52 FOLFIRI + Avastin was on 08/14/2018. CEA 6.8 on 08/14/2018. Cycle # 53 FOLFIRI + Avastin was on 08/28/2018. CEA 6.5 on 08/27/2018. CT chest 09/06/2018 noted interval decrease in size of LUCY measuring up to 4 mm, suggestive of treatment response. No mediastinal or hilar LN  CT abdomen/pelvis 09/06/2018 Slight interval increase in size of a previously noted metastatic lesion within the right hepatic lobe. This may be related to differences in phase of enhancement compared to the most recent study from 5/31/2018, the lesion remains decreased in size compared to the more previous studies. No abdominal, retroperitoneal, or pelvic lymphadenopathy. Continue FOLFIRI + Avastin and repeat scans in 2 months. Cycle # 54 FOLFIRI + Avastin was on 09/11/2018. CEA 6.4 on 09/10/2018. Cycle # 55 FOLFIRI + Avastin was on 09/25/2018. CEA 6.4 on 09/25/2018. Cycle # 56 FOLFIRI + Avastin was on 10/09/2018. CEA 6.7 on 10/08/2018. Cycle # 57 FOLFIRI + Avastin was on 10/23/2018. CEA 7.2 on 10/22/2018. CT chest 11/02/2018 stable 3 mm nodule within LUCY. No new right and left lung nodule. No mediastinal or osseous lesion. CT abdomen/pelvis 11/02/2018 stable metastatic disease to liver. No new metastatic disease identified. No mesenteric or retroperitoneal LN. No gross colonic lesion identified. Continue FOLFIRI + Avastin and repeat scans in 3 months. Cycle # 58 FOLFIRI + Avastin was on 11/06/2018. CEA 7.6 on 11/05/2018. Cycle # 59 FOLFIRI + Avastin was on 11/27/2018. CEA 6.9 on 11/26/2018. Cycle # 60 FOLFIRI + Avastin was on 12/11/2018. CEA 6.7 on 12/11/2018. Cycle # 61 FOLFIRI + Avastin was on 01/08/2019. CEA 5.6 on 01/07/2019. Cycle # 62 FOLFIRI + Avastin was on 01/29/2019. CEA 4.6 on 01/28/2019. Cycle # 63 FOLFIRI + Avastin was on 02/12/2019. CEA 4.3 on 02/11/2019. CT chest 02/21/2019 no evidence of metastatic disease. CT abdomen/pelvis 02/21/2019 stable hypodense liver lesions. No evidence of worsening metastatic disease. 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 Ordered MRI thoracic spine and referred to neurosurgery team.  Cycle # 64 FOLFIRI + Avastin was on 02/26/2019. CEA 4.7 on 02/25/2019. Cycle # 65 FOLFIRI + Avastin was on 03/12/2019. CEA 4.0 on 03/11/2019. Cycle # 66 FOLFIRI + Avastin was on 03/26/2019. CEA 4.7 on 03/25/2019. Cycle # 67 FOLFIRI + Avastin was on 04/09/2019. CEA 5.6 on 04/08/2019. Cycle # 68 FOLFIRI + Avastin was on 04/30/2019. CEA 4.9 on 04/29/2019. Cycle # 69 FOLFIRI + Avastin was on 05/14/2019. CEA 5.3 on 05/14/2019. CT chest 05/23/2019 stable small lung nodule with no evidence of metastatic disease. CT abdomen/pelvis 05/23/2019 Decrease conspicuity of the liver lesions. No new lesions seen. Stable splenomegaly. Continue FOLFIRI + Avastin and repeat scans in 3 months. Cycle # 70 FOLFIRI + Avastin was on 06/04/2019.  CEA 4.8 on 06/03/2019. Cycle # 71 FOLFIRI + Avastin was on 06/18/2019. CEA 4.6 on 06/17/2019. Cycle # 72 FOLFIRI + Avastin was on 07/09/2019. CEA 4.3 on 07/08/2019. Cycle # 73 FOLFIRI + Avastin was on 07/30/2019. CEA 5.0 on 07/29/2019. Cycle # 74 FOLFIRI + Avastin was on 08/13/2019. CEA 5.0 on 08/12/2019. CT chest 08/20/2019 negative  CT abdomen/pelvis 08/20/2019 Previous identified hepatic lesions are not visualized on the current exam.  Continue FOLFIRI + Avastin and repeat scans in 3 months. Cycle # 75 FOLFIRI + Avastin was on 08/27/2019. CEA 5.0 on 08/26/2019. Cycle # 76 FOLFIRI + Avastin was on 09/17/2019. CEA 5.5 on 09/16/2019. Cycle # 77 FOLFIRI + Avastin was on 10/15/2019. CEA 4.6 on 10/15/2019. Cycle # 78 FOLFIRI + Avastin was on 10/29/2019. CEA 4.1 on 10/28/2019. Cycle # 79 FOLFIRI + Avastin was on 11/12/2019. CEA 4.3 on 11/12/2019. Cycle # 80 FOLFIRI + Avastin was on 12/10/2019. CEA 4.0 on 12/09/2019. CT chest 12/19/2019 Stable 3 mm nodule within the left upper lobe, similar to the previous studies dating back to 11/2/2018, however decreased in size compared to the CT from 3/26/2018. No thoracic LN. CT abdomen/pelvis 12/19/2019 Previously described small hypodense lesions are more conspicuous on the current study compared to the recent study throughout the liver from 8/20/2019, however similar to the prior study from 2/21/2019. Findings may be related to differences in contrast opacification. No abdominal or pelvic lymphadenopathy. Continue FOLFIRI + Avastin and repeat scans in 2 months to f/u on liver lesions. Cycle # 81 FOLFIRI + Avastin was on 01/07/2020. CEA 3.8 on 01/06/2020. Cycle # 82 FOLFIRI + Avastin was on 01/21/2020. CEA 3.7 on 01/20/2020. Cycle # 83 FOLFIRI + Avastin was on 02/04/2020. CEA 4.1 on 02/03/2020. Cycle # 84 FOLFIRI + Avastin was on 02/18/2020. CEA 4.6 on 02/17/2020.   CT chest 2/28/2020 no evidence of metastatic disease to the lungs and no mediastinal or hilar adenopathy. CT abdomen pelvis 2/28/2020 no enhancing lesions seen within the liver to suggest metastatic disease. Wall thickening of the rectosigmoid junction. Images reviewed. Continue FOLFIRI Avastin and repeat scans in 3 months  EGD by Dr. Pratik Hills 03/02/2020 showing no masses or lesions. Cycle # 85 FOLFIRI + Avastin was on 03/03/2020. CEA 7.4 on 03/02/2020. Cycle # 86 FOLFIRI + Avastin was on 03/17/2020. CEA 4.5 on 03/16/2020. Colonoscopy on 03/23/2020 by Dr. Pratik Hills unremarkable (records requested). Cycle # 87 FOLFIRI + Avastin was on 03/31/2020. CEA 4.1 on 03/30/2020. Cycle # 88 FOLFIRI + Avastin was on 04/21/2020. CEA 6.4 on 04/20/2020. Cycle # 89 FOLFIRI + Avastin was on 05/05/2020. CEA 5.8 on 05/04/2020. Cycle # 90 FOLFIRI + Avastin was on 06/02/2020. CEA 5.5 on 06/01/2020. Cycle # 91 FOLFIRI + Avastin was on 06/16/2020. CEA 5.6 on 06/15/2020. CT chest 06/23/2020 noted no metastatic disease in the chest.   CT abdomen/pelvis 06/23/2020 Stable small liver lesions measuring up to 5 mm since 2019.   22 mm round mass in segment 8 of the liver with central high density stable from December 2019), previously measuring up to 34 mm in 2017. No additional metastatic disease in the abdomen or pelvis. Small amount of ascites. Overall stable disease. Continue FOLFIRI + Avastin and repeat scans in 2-3 months. Cycle # 92 FOLFIRI + Avastin was on 07/07/2020. CEA 5.7 on 07/06/2020. Cycle # 93 FOLFIRI + Avastin was on 07/21/2020. CEA 5.9 on 07/20/2020. Cycle # 94 FOLFIRI + Avastin was on 08/04/2020. CEA 5.5 on 08/04/2020. Cycle # 95 FOLFIRI + Avastin was on 08/25/2020. CEA 6.1 on 08/24/2020. CT chest 9/2/2020 stable unremarkable enhanced CT of the thorax. There is no metastatic disease to the lungs. CT abdomen pelvis on 9/2/2020 stable 2.2 cm low attenuated lesion within the central aspect of the right hepatic lobe favored to represent treated metastatic disease.   No new hepatic lesion is identified. Stable splenomegaly  There is no appreciable colonic lesion or stricture. Pericholecystic fluid of uncertain etiology. Laparoscopic cholecystectomy with normal cholangiogram 10/27/20  Gallbladder: Chronic cholecystitis and cholelithiasis.  Unremarkable regional lymph node. 11/13/2020; Seen by Dr. Terra Das from General surgery team; cleared to re-start chemotherapy. Cycle # 96 FOLFIRI + Avastin was on 11/17/2020. CEA 3.6 on 11/16/2020. Cycle # 97 FOLFIRI + Avastin was on 12/01/2020. CEA 3.5 on 11/30/2020. Cycle # 98 FOLFIRI + Avastin was on 12/15/2020. CEA 4.3 on 12/15/2020. Cycle # 99 FOLFIRI + Avastin was on 01/05/2021. CEA 4.7 on 01/04/2021. CT chest 01/13/2021 negative for metastatic disease. CT abdomen/pelvis 01/13/2021 Unchanged central hepatic lesion. No specific signs of abdominopelvic metastatic disease. Continue FOLFIRI + Avastin and repeat scans in 3 months. Cycle # 100 FOLFIRI + Avastin was on 01/19/2021. CEA 4.7 on 01/18/2021. Cycle # 101 FOLFIRI + Avastin was on 02/02/2021. CEA 5.2 on 02/01/2021. Cycle # 102 FOLFIRI + Avastin was on 02/16/2021. CEA 5.6 on 02/15/2021. Cycle # 103 FOLFIRI + Avastin was on 03/02/2021. Cycle # 104 FOLFIRI (20% dose reduced due to significant toxicity) + Avastin was on 03/16/2021. Cycle # 105 FOLFIRI (same dose as cycle # 104) + Avastin was on 03/30/2021. CEA 6.5 on 03/29/2021. Cycle # 106 FOLFIRI (same dose as cycle # 104) + Avastin was on 04/13/2021. CEA 6.0 on 04/12/2021  CT chest 04/20/2021 no evidence of metastatic disease. CT abdomen/pelvis 04/20/2021 No evidence of progressive metastatic disease. Stable 2 cm lesion in the right lobe of the liver, likely representing treated metastatic disease. Imaging reviewed. Continue FOLFIRI + Avastin and repeat scans in 3 months  Cycle # 107 FOLFIRI + Avastin was on 04/27/2021  Cycle # 108 FOLFIRI + Avastin was on 05/11/2021. CEA 6.4 on 05/10/2021.    Cycle # 109 FOLFIRI (held Avastin due to upcoming surgery) on 05/25/2021. CEA 6.3 on 5/24/21  Cycle # 110 FOLFIRI (held Avastin due to upcoming surgery) on 06/08/2021. CEA 6.3 on 6/07/21. CEA 5.8 on 06/28/2021. Right inguinal/scrotol hernia repair on 07/13/2021 with Dr Salud Valle with folely removal on 07/23/2021. He developed fever on 07/24/2021 and was brought to [address] [place], [place] [address] ER via EMS; Sepsis secondary to urinary tract infection; Completed Abx  CT chest 08/13/2021 no sign of recurrent or metastatic disease. CT abdomen/pelvis 08/13/2021 unchanged hepatic lesions. Splenomegaly and secondary signs of portal venous hypertension  CEA 4.0 on 08/16/2021  CEA 4.1 on 08/30/2021  Cycle # 111 FOLFIRI was on 08/31/2021. HBP team for evaluation of liver lesions; His lesion does appear to be a hemangioma as his prior metastatic deposits in his liver were hypo attenuating compared to this CT which is hyper attenuating. compared to his prior CT scans he has disappearing liver metastasis. MRI liver on 09/17/2021 No suspicious liver lesion identified. Colonoscopy GI Dr. Juaquin Hannah on 09/20/2021 unremarkable  CEA 3.3 on 09/27/2021. CEA 3.2 on 10/25/2021. CT chest 11/08/2021: Similar chronic appearing findings.  No acute process or evidence of metastatic disease identified. CT Abdomen/pelvis 11/08/2021: No significant change in the appearance of the liver. Similar appearance of splenomegaly. No interval change. CEA 2.7 on 11/22/2021. CEA 2.5 on 12/27/2021  CEA 2.4 on 01/24/2022  CT chest/abdomen/pelvis 02/04/2022 Stable CT of the chest abdomen/ pelvis with the a 1.4 cm enhancing nodule in the right hepatic lobe which is marginally improved. Imaging reviewed. CEA 2.3 on 02/21/2022  CEA 2.4 on 03/21/2022  Labs reviewed. RTC 4 weeks with prior labs.  Scans after next visit  MSI testing noted no mismatch repair protein loss of expression    3/21/2022  Gerard Lucio MD  Board Certified Medical Oncologist

## 2022-03-22 NOTE — PROGRESS NOTES
PORT FLUSH    Patient presents to clinic for [facility] today. chest  SQ port accessed per policy using 20 gauge 4.18 inch Campos needle for good blood return. Aspirate for waste and specimen sent to lab. Site flushed easily with 10 mL NSS followed by 5 mL Heparin solution 100 units/ml rinse prior to de-access. Dry sterile dressing to area. Tolerated procedure well. Encouraged to schedule port flush every 4 weeks.

## 2022-04-18 ENCOUNTER — HOSPITAL ENCOUNTER (OUTPATIENT)
Age: 72
Discharge: HOME | End: 2022-04-18
Payer: MEDICARE

## 2022-04-18 DIAGNOSIS — C20 RECTAL CANCER METASTASIZED TO LIVER (HCC): ICD-10-CM

## 2022-04-18 DIAGNOSIS — C78.7 RECTAL CANCER METASTASIZED TO LIVER (HCC): ICD-10-CM

## 2022-04-18 LAB
ALBUMIN SERPL-MCNC: 3.8 G/DL (ref 3.5–5.2)
ALP BLD-CCNC: 139 U/L (ref 40–129)
ALT SERPL-CCNC: 12 U/L (ref 0–40)
ANION GAP SERPL CALCULATED.3IONS-SCNC: 11 MMOL/L (ref 7–16)
AST SERPL-CCNC: 21 U/L (ref 0–39)
BASOPHILS ABSOLUTE: 0.18 E9/L (ref 0–0.2)
BASOPHILS RELATIVE PERCENT: 3.5 % (ref 0–2)
BILIRUB SERPL-MCNC: 0.5 MG/DL (ref 0–1.2)
BUN BLDV-MCNC: 27 MG/DL (ref 6–23)
CALCIUM SERPL-MCNC: 9.7 MG/DL (ref 8.6–10.2)
CEA: 2.2 NG/ML (ref 0–5.2)
CHLORIDE BLD-SCNC: 101 MMOL/L (ref 98–107)
CO2: 26 MMOL/L (ref 22–29)
CREAT SERPL-MCNC: 1.5 MG/DL (ref 0.7–1.2)
EOSINOPHILS ABSOLUTE: 0.09 E9/L (ref 0.05–0.5)
EOSINOPHILS RELATIVE PERCENT: 1.7 % (ref 0–6)
GFR AFRICAN AMERICAN: 56
GFR NON-AFRICAN AMERICAN: 46 ML/MIN/1.73
GLUCOSE BLD-MCNC: 102 MG/DL (ref 74–99)
HCT VFR BLD CALC: 31.9 % (ref 37–54)
HEMOGLOBIN: 10.5 G/DL (ref 12.5–16.5)
LYMPHOCYTES ABSOLUTE: 0.35 E9/L (ref 1.5–4)
LYMPHOCYTES RELATIVE PERCENT: 7 % (ref 20–42)
MCH RBC QN AUTO: 30.3 PG (ref 26–35)
MCHC RBC AUTO-ENTMCNC: 32.9 % (ref 32–34.5)
MCV RBC AUTO: 92.2 FL (ref 80–99.9)
MONOCYTES ABSOLUTE: 0.25 E9/L (ref 0.1–0.95)
MONOCYTES RELATIVE PERCENT: 5.2 % (ref 2–12)
NEUTROPHILS ABSOLUTE: 4.15 E9/L (ref 1.8–7.3)
NEUTROPHILS RELATIVE PERCENT: 82.6 % (ref 43–80)
OVALOCYTES: ABNORMAL
PDW BLD-RTO: 13.5 FL (ref 11.5–15)
PLATELET # BLD: 118 E9/L (ref 130–450)
PMV BLD AUTO: 9.5 FL (ref 7–12)
POIKILOCYTES: ABNORMAL
POTASSIUM SERPL-SCNC: 3.9 MMOL/L (ref 3.5–5)
RBC # BLD: 3.46 E12/L (ref 3.8–5.8)
SODIUM BLD-SCNC: 138 MMOL/L (ref 132–146)
TOTAL PROTEIN: 7.5 G/DL (ref 6.4–8.3)
WBC # BLD: 5 E9/L (ref 4.5–11.5)

## 2022-04-18 PROCEDURE — 85025 COMPLETE CBC W/AUTO DIFF WBC: CPT

## 2022-04-18 PROCEDURE — 36415 COLL VENOUS BLD VENIPUNCTURE: CPT

## 2022-04-18 PROCEDURE — 82378 CARCINOEMBRYONIC ANTIGEN: CPT

## 2022-04-18 PROCEDURE — 80053 COMPREHEN METABOLIC PANEL: CPT

## 2022-04-19 ENCOUNTER — OFFICE VISIT (OUTPATIENT)
Dept: ONCOLOGY | Age: 72
End: 2022-04-19
Payer: MEDICARE

## 2022-04-19 ENCOUNTER — HOSPITAL ENCOUNTER (OUTPATIENT)
Dept: INFUSION THERAPY | Age: 72
Discharge: HOME | End: 2022-04-19

## 2022-04-19 VITALS
HEART RATE: 65 BPM | OXYGEN SATURATION: 100 % | SYSTOLIC BLOOD PRESSURE: 133 MMHG | DIASTOLIC BLOOD PRESSURE: 75 MMHG | WEIGHT: 213 LBS | TEMPERATURE: 99.1 F | BODY MASS INDEX: 28.23 KG/M2 | HEIGHT: 73 IN

## 2022-04-19 DIAGNOSIS — C20 RECTAL CANCER METASTASIZED TO LIVER (HCC): Primary | ICD-10-CM

## 2022-04-19 DIAGNOSIS — C78.7 RECTAL CANCER METASTASIZED TO LIVER (HCC): Primary | ICD-10-CM

## 2022-04-19 DIAGNOSIS — C20 RECTAL CANCER (HCC): Primary | ICD-10-CM

## 2022-04-19 PROCEDURE — 99212 OFFICE O/P EST SF 10 MIN: CPT

## 2022-04-19 PROCEDURE — 99214 OFFICE O/P EST MOD 30 MIN: CPT | Performed by: INTERNAL MEDICINE

## 2022-04-19 PROCEDURE — 6360000002 HC RX W HCPCS: Performed by: INTERNAL MEDICINE

## 2022-04-19 PROCEDURE — 2580000003 HC RX 258: Performed by: INTERNAL MEDICINE

## 2022-04-19 RX ORDER — SODIUM CHLORIDE 0.9 % (FLUSH) 0.9 %
10 SYRINGE (ML) INJECTION PRN
Status: DISCONTINUED | OUTPATIENT
Start: 2022-04-19 | End: 2022-04-20 | Stop reason: HOSPADM

## 2022-04-19 RX ORDER — SODIUM CHLORIDE 0.9 % (FLUSH) 0.9 %
10 SYRINGE (ML) INJECTION PRN
Status: CANCELLED | OUTPATIENT
Start: 2022-04-19

## 2022-04-19 RX ORDER — HEPARIN SODIUM (PORCINE) LOCK FLUSH IV SOLN 100 UNIT/ML 100 UNIT/ML
500 SOLUTION INTRAVENOUS PRN
Status: DISCONTINUED | OUTPATIENT
Start: 2022-04-19 | End: 2022-04-20 | Stop reason: HOSPADM

## 2022-04-19 RX ORDER — HEPARIN SODIUM (PORCINE) LOCK FLUSH IV SOLN 100 UNIT/ML 100 UNIT/ML
500 SOLUTION INTRAVENOUS PRN
Status: CANCELLED | OUTPATIENT
Start: 2022-04-19

## 2022-04-19 RX ADMIN — HEPARIN 500 UNITS: 100 SYRINGE at 09:05

## 2022-04-19 RX ADMIN — Medication 10 ML: at 09:05

## 2022-04-19 NOTE — PROGRESS NOTES
[place] [id number]           Attending Clinic Note     Reason for Visit: Follow-up on a patient with Metastatic Rectal Cancer.     PCP: Aury Goins MD     History of Present Illness:  65 y/o  male who was referred to see Dr. Burnice Fleischer (GI team) for evaluation of bright red blood per rectum, mild anemia and change in bowel habits with diarrhea. CEA 2640 on 10/19/2015. AlcP 299 AST 57 ALT 75 on 10/19/2015. Colonoscopy in 2013 noted no significant polyps, colitis or lesions at that time. Denies any Family History of colorectal cancer or polyps.     Colonoscopy on 10/19/2015 revealed:  1. Ascending polyp, 8 mm, hot snare: Tubulovillous adenoma. 2. Transverse polyp, 1 cm, hot snare: Serrated polyp most consistent with sessile serrated adenoma. 3. Five splenic flexure polyps, three - 5 mm, 7 mm, 8 mm, hot snare and biopsy: Four segments of Tubular Adenoma  4. Descending polyp, 5 mm, biopsy: Tubular adenoma. 5. Sigmoid polyp, 4 mm biopsy, Serrated polyp most consistent with sessile serrated adenoma. 6. Two rectal polyps, 4 mm biopsy: Serrated polyp most consistent with hyperplastic polyp. 7. Rectosigmoid colon mass (large mass approximately 65% circumference of the lumen; Very friable, firm and hard): Tubulovillous adenoma with associated focal erosion and fibroplasia.      CT scan abdomen/pelvis on 10/26/2015:  1. Small nodules at lung bases likely represent metastatic colon   cancer. 2. Extensive likely metastatic colon cancer throughout the liver.      3. Mild mural thickening and luminal narrowing in the   terminal ileum, otherwise nonspecific.      Colonoscopy with snare removal rectal mass was performed by Dr. Claudia Mercado. Pathology proved:  Rectal polyp: Invasive adenocarcinoma involving villous adenoma and extending to the cauterized edge of excision. KRAS Mutation: Mutation detected. BRAF Mutation: Mutation not detected.   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     CEA 3488. Bone scan on 11/10/2015 noted no metastatic disease. CT chest on 11/10/2015 revealed Multiple pulmonary nodules in the upper and lower lobes consistent with metastatic disease;   Hepatic metastasis also visualized. 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 Mediport was placed. Cycle # 1 of FOLFOX + Avastin was on 11/23/2015. CEA was 4555 on 11/23/2015. Cycle # 2 of FOLFOX + Avastin was on 12/08/2015. CEA was 3160 on 12/08/2015. Cycle # 3 of FOLFOX + Avastin was on 12/22/2015. CEA was 2516 on 12/21/2015. Cycle # 4 of FOLFOX + Avastin was on 01/05/2016. CEA was 2098 on 01/05/2015. Cycle # 5 of FOLFOX + Avastin was on 01/19/2016. CEA was 1511 on 01/05/2015.     -Bone scan on 01/26/2016 noted no metastatic disease. CT chest on 01/26/2016 revealed Significant response to treatment with no visible residual nodules. CT scan abdomen/pelvis on 01/26/2016 noted Interval decreased size of multiple masses in the liver compatible with treatment response. Continue another 2 months of FOLFOX + Avastin and repeat scans. Cycle # 6 of FOLFOX + Avastin was on 02/02/2016.  on 02/02/2016. Cycle # 7 of FOLFOX + Avastin was on 02/16/2016.  on 02/16/2016. Cycle # 8 of FOLFOX + Avastin was on 03/01/2016.  on 03/01/2016. Cycle # 9 of FOLFOX + Avastin was on 03/15/2016.  on 03/15/2016. Cycle # 10 of FOLFOX + Avastin was on 03/29/2016. .4 on 03/29/2016. Cycle # 11 of FOLFOX + Avastin was on 04/12/2016. .4 on 04/12/2016.     Re-staging scans on 04/19/2016: CT Chest: clear lungs; no evidence of recurring pulmonary nodule; CT Abdomen/Pelvis: Further interval decrease in size of the multiple metastatic hepatic lesions, the largest lesion now measures 3.9 x 3.5 cm and previously measured 4.5 cm in maximum diameter.  No mesenteric lymphadenopathy is identified; Bone Scan: No evidence of osseous metastasis. Continue same regimen and re-stage in 2-3 months. Cycle # 12 FOLFOX + Avastin was on 2016. .5 on 2016. Cycle # 13 FOLFOX + Avastin was on 05/10/2016. .3 on 05/10/2016. Cycle # 14 FOLFOX+AVASTIN was on 2016. .5 on 2016. Cycle # 15 FOLFOX + Avastin was on 2016. CEA 90.5 on 2016. Admitted to [facility] 2016-2016 for abdominal pain: EGD noted 1.5 cm clean based duodenal bulb ulceration s/p epinephrine and bicap per Dr. Lester Valenzuela. No active bleeding. A. Stomach, biopsy: Mild chronic gastritis, immunostain negative for Helicobacter  B. Esophagus, biopsy: Gastric glandular mucosa with prominent intestinal metaplasia ([place]'s epithelium), negative for epithelial dysplasia, esophageal squamous mucosa not identified  Cycle # 16 FOLFOX (discontinued avastin (bevacizumab) given association of peptic ulcer disease and known association of GI perforation) was on 2016. Cycle # 17 FOLFOX was on 2016. CEA 52.6 on 2016. Cycle # 17 FOLFOX was on 2016. CEA 52.6 on 2016. CEA 47.6 on 2016.     Re-staging scans 2106: CT Chest negative for metastatic disease. CT Abdomen/Pelvis: Stable hepatic lesions; Question new mural thickening in the cecum. Bone Scan: New Let hip lesion suspicious for bone metastasis.     Increased CEA; new mural thickening in the cecum and new left hip lesion suspicious for bone metastasis are consistent with disease progression; He derived maximum benefit from FOLFOX/AVASTIN. D/C FOLFOX/AVASTIN. We recommended FOLFIRI second line therapy. Cycle # 1 FOLFIRI was on 2016. CEA 40.8 on 2016. Xgeva q4 weeks started on 2016. Cycle # 2 FOLFIRI was on 2016. CEA 31.7 on 2016. Cycle # 3 FOLFIRI (added Avastin) was on 2016 given that ulcers healed on EGD 08/15/2016 by Dr. Yetta Apgar; Protonix bid since avastin re-started.    Colonoscopy on 2016 (to look at the cecal area) unremarkable. CEA 26.7 on 08/30/2016. Cycle # 4 FOLFIRI/Avastin was on 09/13/2016. CEA 23.4 on 09/13/2016. Cycle # 5 FOLFIRI/Avastin was on 09/27/2016. CEA 22.3 on 09/27/2016. Cycle # 6 FOLFIRI/Avastin was on 10/11/2016. CEA 18.4 on 10/11/2016.      Bone scan 10/21/2016 stable bone metastasis; ? New lesion anterior left sixth rib likely interval healing fracture. CT abdomen/pelvis revealed stable hepatic metastasis. CT chest negative for metastatic disease. Continue FOLFIRI/Avastin and repeat scans in 3 months. Cycle # 7 FOLFIRI/Avastin was on 10/25/2016. CEA 16.1  Cycle # 8 FOLFIRI/Avastin was on 11/08/2016. CEA 15.7  Cycle # 9 FOLFIRI/Avastin was on 11/29/2016. CEA 13.6 on 11/29/2016. Cycle # 10 FOLFIRI/Avastin was on 12/13/2016. CEA 10.6 on 12/13/2016. Cycle # 11 FOLFIRI/Avastin was on 12/27/2016. CEA 11.1 on 12/27/2016. Cycle # 12 FOLFIRI/Avastin was on 01/10/2017. CEA 9.7 on 01/10/2017.       CT chest 01/23/2017 No new pulmonary nodule or lymphadenopathy. Patchy groundglass opacities within the left lower lobe, suggestive of infectious or inflammatory etiology. Followup CT chest in 3 months. Slight increased linear sclerosis along the left anterior sixth rib corresponding to an area of increased uptake on the prior bone scan from 10/21/2016, favored to be related to interval healing changes of a nondisplaced fracture. CT abdomen/pelvis on 01/23/2017 Redemonstration of multiple hepatic metastases, which are similar compared to the prior CT from 10/21/2016. Redemonstration of area of increased sclerosis along the right acetabulum suspicious for metastatic disease. Bone scan on 01/23/2017 Stable subtle uptake within the left proximal diaphysis, again suggestive of metastatic disease  Decreased subtle uptake within the medial right acetabulum.    Decreased uptake within the left anterior sixth rib corresponding to linear sclerosis on the recent CT, favored to be related to an joint rib fracture. Continue FOLFIRI + Avastin and repeat scans in 3 months. Cycle # 13 FOLFIRI + Avastin was on 01/24/2017. Cycle # 14 FOLFIRI + Avastin was on 02/07/2017. Cycle # 15 FOLFIRI + Avastin was on 02/21/2017. Cycle # 16 FOLFIRI + Avastin was on 03/07/2017. Cycle # 17 FOLFIRI + Avastin was on 03/21/2017. Cycle # 18 FOLFIRI + Avastin was on 04/04/2017. CT chest 04/17/2017 negative for metastatic disease. CT abdomen/pelvis 04/17/2017 Stable hypodense metastatic lesions within the liver. Stable area of increased sclerosis along the right acetabulum  Bone scan 04/17/2017 Stable subtle uptake within the left proximal femoral diaphysis; No definite abnormal uptake in the region of a sclerotic lesion along the posterior column of the right acetabulum noted on CT. Stable slight uptake within the left anterior sixth rib corresponding to linear sclerosis on the recent CT, favored to be related to a fracture. Continue FOLFIRI + Avastin and repeat scans in 3 months. Cycle # 19 FOLFIRI + Avastin was on 04/18/2017. Cycle # 20 FOLFIRI + Avastin was on 05/02/2017. Cycle # 21 FOLFIRI + Avastin was on 05/16/2017. Cycle # 22 FOLFIRI + Avastin was on 05/30/2017. Cycle # 23 FOLFIRI + Avastin was on 06/13/2017. Cycle # 24 FOLFIRI + Avastin was on 06/27/2017. Cycle # 25 FOLFIRI + Avastin was on 07/11/2017. Cycle # 26 FOLFIRI + Avastin was on 07/25/2017. Cycle # 27 FOLFIRI + Avastin was on 08/08/2017. Cycle # 28 FOLFIRI + Avastin was on 08/22/2017. Cycle # 29 FOLFIRI + Avastin was on 09/05/2017. Cycle # 30 FOLFIRI + Avastin was on 09/19/2017. Bone scan 09/26/2017 stable. CT abdomen/pelvis on 09/26/2017 Stable hypodense mass in the liver. Stable sclerotic lesion in the acetabulum. CT chest 09/26/2017 negative for metastatic disease. Cycle # 31 FOLFIRI + Avastin was on 10/03/2017. CEA 7.4 on 10/03/2017. Cycle # 32 FOLFIRI + Avastin was on 10/17/2017. CEA 6.0 on 10/17/2017.   Cycle # 33 FOLFIRI + Avastin was on 10/31/2017. CEA 6.8 on 10/31/2017. Cycle # 34 FOLFIRI + Avastin was on 11/14/2017. CEA 7.2 on 11/14/2017. Cycle # 35 FOLFIRI + Avastin was on 11/28/2017. CEA 5.1 on 11/28/2017. Cycle # 36 FOLFIRI + Avastin was on 12/12/2017. CEA 6.1 on 12/12/2017. Bone scan 12/22/2017 noted no evidence of metastatic disease to the axial or appendicular skeleton. CT chest 12/22/2017 noted no evidence of metastatic disease. CT abdomen/pelvis 12/22/2017 noted stable hypodense lesions in the liver. Continue FOLFIRI + Avastin and repeat scans in 3 months. Cycle # 37 FOLFIRI + Avastin was on 12/27/2018. Cycle # 38 FOLFIRI + Avastin was on 01/09/2018. Cycle # 39 FOLFIRI + Avastin was on 01/23/2018. Cycle # 40 FOLFIRI + Avastin was on 02/06/2018. Cycle # 41 FOLFIRI + Avastin was on 02/20/2018. Cycle # 42 FOLFIRI + Avastin was on 03/06/2018. Cycle # 43 FOLFIRI + Avastin was on 03/20/2018. Bone scan on 03/26/2018 negative for metastatic disease. CT chest 03/26/2018 noted ? new 7 mm nodule in LUCY. CT abdomen/pelvis 03/26/2018 noted stable hypodense lesions in the liver. ? New small ascites in the abdomen. Patient wants to continue to monitor the lung finding and repeat scans in 2 months instead of changing the current regimen into oral Lonsurf. Cycle # 44 FOLFIRI + Avastin was on 04/03/2018. CEA 6.3 on 04/03/2018. Cycle # 45 FOLFIRI + Avastin was on 04/24/2018. CEA 5.3 on 04/24/2018. Cycle # 46 FOLFIRI + Avastin was on 05/08/2018. CEA 5.4 on 05/08/2018. Cycle # 47 FOLFIRI + Avastin was on 05/22/2018. CEA 6.1 on 05/22/2018. CT chest 05/31/2018 noted stable nodule in LUCY. No evidence of worsening malignancy. CT abdomen/pelvis on 05/31/2018 noted stable liver metastasis. No evidence of worsening malignancy. Continue FOLFIRI + Avastin and repeat scans in 3 months. Cycle # 48 FOLFIRI + Avastin was on 06/06/2018. Cycle # 49 FOLFIRI + Avastin was on 06/19/2018.    Cycle # 50 FOLFIRI + Avastin was on 07/03/2018. Cycle # 51 FOLFIRI + Avastin was on 07/24/2018. Cycle # 52 FOLFIRI + Avastin was on 08/14/2018. Cycle # 53 FOLFIRI + Avastin was on 08/28/2018. CT chest 09/06/2018 noted interval decrease in size of LUCY measuring up to 4 mm, suggestive of treatment response. No mediastinal or hilar LN  CT abdomen/pelvis 09/06/2018 Slight interval increase in size of a previously noted metastatic lesion within the right hepatic lobe. This may be related to differences in phase of enhancement compared to the most recent study from 5/31/2018, the lesion remains decreased in size compared to the more previous studies. No abdominal, retroperitoneal, or pelvic lymphadenopathy. Continue FOLFIRI + Avastin and repeat scans in 2 months. Cycle # 54 FOLFIRI + Avastin was on 09/11/2018. Cycle # 55 FOLFIRI + Avastin was on 09/25/2018. Cycle # 56 FOLFIRI + Avastin was on 10/09/2018. Cycle # 57 FOLFIRI + Avastin was on 10/23/2018. CT chest 11/02/2018 stable 3 mm nodule within LUCY. No new right and left lung nodule. No mediastinal or osseous lesion. CT abdomen/pelvis 11/02/2018 stable metastatic disease to liver. No new metastatic disease identified. No mesenteric or retroperitoneal LN. No gross colonic lesion identified. Continue FOLFIRI + Avastin and repeat scans in 3 months. Cycle # 58 FOLFIRI + Avastin was on 11/06/2018. CEA 7.6 on 11/05/2018. Cycle # 59 FOLFIRI + Avastin was on 11/27/2018. CEA 6.9 on 11/26/2018. Cycle # 60 FOLFIRI + Avastin was on 12/11/2018. CEA 6.7 on 12/11/2018. Cycle # 61 FOLFIRI + Avastin was on 01/08/2019. CEA 5.6 on 01/07/2019. Cycle # 62 FOLFIRI + Avastin was on 01/29/2019. CEA 4.6 on 01/28/2019. Cycle # 63 FOLFIRI + Avastin was on 02/12/2019. CEA 4.3 on 02/11/2019. CT chest 02/21/2019 no evidence of metastatic disease. CT abdomen/pelvis 02/21/2019 stable hypodense liver lesions. No evidence of worsening metastatic disease.  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 Ordered MRI thoracic spine and referred to neurosurgery team.    Cycle # 64 FOLFIRI + Avastin was on 02/26/2019. CEA 4.7 on 02/25/2019. Cycle # 65 FOLFIRI + Avastin was on 03/12/2019. CEA 4.0 on 03/11/2019. Cycle # 66 FOLFIRI + Avastin was on 03/26/2019. CEA 4.7 on 03/25/2019. Cycle # 67 FOLFIRI + Avastin was on 04/09/2019. CEA 5.6 on 04/08/2019. Cycle # 68 FOLFIRI + Avastin was on 04/30/2019. CEA 4.9 on 04/29/2019. Cycle # 69 FOLFIRI + Avastin was on 05/14/2019. CEA 5.3 on 05/14/2019. CT chest 05/23/2019 stable small lung nodule with no evidence of metastatic disease. CT abdomen/pelvis 05/23/2019 Decrease conspicuity of the liver lesions. No new lesions seen. Stable splenomegaly. Continue FOLFIRI + Avastin and repeat scans in 3 months. Cycle # 70 FOLFIRI + Avastin was on 06/04/2019. CEA 4.8 on 06/03/2019. Cycle # 71 FOLFIRI + Avastin was on 06/18/2019. CEA 4.6 on 06/17/2019. Cycle # 72 FOLFIRI + Avastin was on 07/09/2019. CEA 4.3 on 07/08/2019. Cycle # 73 FOLFIRI + Avastin was on 07/30/2019. CEA 5.0 on 07/29/2019. Cycle # 74 FOLFIRI + Avastin was on 08/13/2019. CEA 5.0 on 08/12/2019. CT chest 08/20/2019 negative  CT abdomen/pelvis 08/20/2019 Previous identified hepatic lesions are not visualized on the current exam.  Continue FOLFIRI + Avastin and repeat scans in 3 months. Cycle # 75 FOLFIRI + Avastin was on 08/27/2019. CEA 5.0 on 08/26/2019. Cycle # 76 FOLFIRI + Avastin was on 09/17/2019. CEA 5.5 on 09/16/2019. Cycle # 77 FOLFIRI + Avastin was on 10/15/2019. CEA 4.6 on 10/15/2019. Cycle # 78 FOLFIRI + Avastin was on 10/29/2019. CEA 4.1 on 10/28/2019. Cycle # 79 FOLFIRI + Avastin was on 11/12/2019. CEA 4.3 on 11/12/2019. Cycle # 80 FOLFIRI + Avastin was on 12/10/2019. CEA 4.0 on 12/09/2019.   CT chest 12/19/2019 Stable 3 mm nodule within the left upper lobe, similar to the previous studies dating back to 11/2/2018, however decreased in size compared to the CT from 3/26/2018. No thoracic LN. CT abdomen/pelvis 12/19/2019 Previously described small hypodense lesions are more conspicuous on the current study compared to the recent study throughout the liver from 8/20/2019, however similar to the prior study from 2/21/2019. Findings may be related to differences in contrast opacification. No abdominal or pelvic lymphadenopathy. Continue FOLFIRI + Avastin and repeat scans in 2 months to f/u on liver lesions. Cycle # 81 FOLFIRI + Avastin was on 01/07/2020. CEA 3.8 on 01/06/2020. Cycle # 82 FOLFIRI + Avastin was on 01/21/2020. CEA 3.7 on 01/20/2020. Cycle # 83 FOLFIRI + Avastin was on 02/04/2020. CEA 4.1 on 02/03/2020. Cycle # 84 FOLFIRI + Avastin was on 02/18/2020. CEA 4.6 on 02/17/2020. EGD by Dr. Trae Medel 03/02/2020 showing no masses or lesions  Cycle # 85 FOLFIRI + Avastin was on 03/03/2020. CEA 7.4 on 03/02/2020. Cycle # 86 FOLFIRI + Avastin was on 03/17/2020. CEA 4.5 on 03/16/2020. Colonoscopy on 03/23/2020 by Dr. Trae Medel unremarkable (records requested). Cycle # 87 FOLFIRI + Avastin was on 03/31/2020. CEA 4.1 on 03/30/2020. Cycle # 88 FOLFIRI + Avastin was on 04/21/2020. CEA 6.4 on 04/20/2020. Cycle # 89 FOLFIRI + Avastin was on 05/05/2020. CEA 5.8 on 05/04/2020. Cycle # 90 FOLFIRI + Avastin was on 06/02/2020. CEA 5.5 on 06/01/2020. Cycle # 91 FOLFIRI + Avastin was on 06/16/2020. CEA 5.6 on 06/15/2020. CT chest 06/23/2020 noted no metastatic disease in the chest.   CT abdomen/pelvis 06/23/2020 Stable small liver lesions measuring up to 5 mm since 2019.   22 mm round mass in segment 8 of the liver with central high density stable from December 2019), previously measuring up to 34 mm in 2017. No additional metastatic disease in the abdomen or pelvis. Small amount of ascites. Overall stable disease. Continue FOLFIRI + Avastin and repeat scans in 2-3 months. Cycle # 92 FOLFIRI + Avastin was on 07/07/2020. CEA 5.7 on 07/06/2020. Cycle # 93 FOLFIRI + Avastin was on 07/21/2020. CEA 5.9 on 07/20/2020. Cycle # 94 FOLFIRI + Avastin was on 08/04/2020. CEA 5.5 on 08/04/2020. Cycle # 95 FOLFIRI + Avastin was on 08/25/2020. CEA 6.1 on 08/24/2020. CT chest 9/2/2020 stable unremarkable enhanced CT of the thorax. There is no metastatic disease to the lungs. CT abdomen pelvis on 9/2/2020 stable 2.2 cm low attenuated lesion within the central aspect of the right hepatic lobe favored to represent treated metastatic disease. No new hepatic lesion is identified. Stable splenomegaly  There is no appreciable colonic lesion or stricture  Pericholecystic fluid of uncertain etiology. General surgery team consulted. Laparoscopic cholecystectomy with normal cholangiogram 10/27/20  Gallbladder: Chronic cholecystitis and cholelithiasis.  Unremarkable regional lymph node. 11/13/2020; Seen by Dr. Nkechi Ordonez from General surgery team; cleared to re-start chemotherapy. Cycle # 96 FOLFIRI + Avastin was on 11/17/2020. CEA 3.6 on 11/16/2020. Cycle # 97 FOLFIRI + Avastin was on 12/01/2020. CEA 3.5 on 11/30/2020. Cycle # 98 FOLFIRI + Avastin was on 12/15/2020. CEA 4.3 on 12/15/2020. Cycle # 99 FOLFIRI + Avastin was on 01/05/2021. CEA 4.7 on 01/04/2021. CT chest 01/13/2021 negative for metastatic disease. CT abdomen/pelvis 01/13/2021 Unchanged central hepatic lesion. No specific signs of abdominopelvic metastatic disease. Continue FOLFIRI + Avastin and repeat scans in 3 months. Cycle # 100 FOLFIRI + Avastin was on 01/19/2021. CEA 4.7 on 01/18/2021. Cycle # 101 FOLFIRI + Avastin was on 02/02/2021. CEA 5.2 on 02/01/2021. Cycle # 102 FOLFIRI + Avastin was on 02/16/2021. CEA 5.6 on 02/15/2021. Cycle # 103 FOLFIRI + Avastin was on 03/02/2021. Cycle # 104 FOLFIRI (20% dose reduced due to significant toxicity) + Avastin was on 03/16/2021. Cycle # 105 FOLFIRI (same dose as cycle # 104) + Avastin was on 03/30/2021. CEA 6.5 on 03/29/2021.   Cycle # 106 FOLFIRI (same dose as cycle # 104) + Avastin was on 04/13/2021. CEA 6.0 on 04/12/2021  CT chest 04/20/2021 no evidence of metastatic disease. CT abdomen/pelvis 04/20/2021 No evidence of progressive metastatic disease. Stable 2 cm lesion in the right lobe of the liver, likely representing treated metastatic disease. Imaging reviewed. Continue FOLFIRI + Avastin and repeat scans in 3 months  Cycle # 107 FOLFIRI + Avastin was on 04/27/2021  Cycle # 108 FOLFIRI + Avastin was on 05/11/2021. Cycle # 109 FOLFIRI (held Avastin due to upcoming surgery) on 05/25/2021. CEA 6.3 on 5/24/21  Cycle # 110 FOLFIRI (held Avastin due to upcoming surgery) on 06/08/2021. CEA 6.3 on 6/07/21. Right inguinal/scrotol hernia repair on 07/13/2021 with Dr Vania Matt with folely removal on Friday 07/23/2021. He developed fever on 07/24/2021 and was brought to [address] [place] [address],[address] ER via EMS; Sepsis secondary to urinary tract infection   Completed Abx  CEA 4.1 on 08/30/2021  Cycle # 111 FOLFIRI was on 08/31/2021. HBP team for evaluation of liver lesions; His lesion does appear to be a hemangioma as his prior metastatic deposits in his liver were hypo attenuating compared to this CT which is hyper attenuating. compared to his prior CT scans he has disappearing liver metastasis. MRI liver on 09/17/2021 No suspicious liver lesion identified. Colonoscopy GI Dr. Harriett Jones on 09/20/2021 unremarkable  CEA 3.3 on 09/27/2021. CEA 3.2 on 10/25/2021. CEA 2.7 on 11/22/2021. CEA 2.5 on 12/27/2021. CEA 2.4 on 01/24/2022. CEA 2.3 on 02/21/2022. CEA 2.4 on 03/21/2022. CEA 2.2 on 04/18/2022  Today 04/19/2022 for f/u. No fever, chills. No nausea/vomiting. Fair appetite and energy level. He complains of arthritis neuropathy      Review of Systems;  CONSTITUTIONAL: No fever, chills. Fair appetite and energy level  ENMT: Eyes: No diplopia; Nose: No epistaxis. Mouth:No lesions  RESPIRATORY: No hemoptysis, shortness of breath.    CARDIOVASCULAR: No chest pain, palpitations. GASTROINTESTINAL:No N/V, abdominal pain. GENITOURINARY: No dysuria, urinary frequency, hematuria. MSK: + Arthritis neuropathy  NEURO: No syncope, presyncope, headache. Remainder ROS: Negative. Past Medical History:   Past Medical History               Diagnosis Date    Arthritis      Cancer ([address]         colon    Depression      Hyperlipidemia      Hypertension           Medications:  Reviewed and reconciled.     Allergies: Allergies   Allergen Reactions    Neosporin [Neomycin-Polymyxin-Gramicidin] Rash    T[place] Tape] Rash      Physical Exam:  /75   Pulse 65   Temp 99.1 °F (37.3 °C)   Ht 6' 1\" (1.854 m)   Wt 213 lb (96.6 kg)   SpO2 100%   BMI 28.10 kg/m²   GENERAL: Alert, oriented x 3, not in distress  HEENT: PERRLA, EOMI. NECK: Supple. Without lymphadenopathy. LUNGS: CTA bilaterally, with no wheezing, crackles or rhonchi. CARDIOVASCULAR: Regular rhythm. No murmurs, rubs or gallops. ABDOMEN: Soft. Non-tender, non-distended. EXTREMITIES: Without clubbing, cyanosis  NEUROLOGIC: No focal deficits. ECOG PS 1    Diagnostics:  Lab Results   Component Value Date    WBC 5.0 04/18/2022    HGB 10.5 (L) 04/18/2022    HCT 31.9 (L) 04/18/2022    MCV 92.2 04/18/2022     (L) 04/18/2022     Lab Results   Component Value Date     04/18/2022    K 3.9 04/18/2022     04/18/2022    CO2 26 04/18/2022    BUN 27 (H) 04/18/2022    CREATININE 1.5 (H) 04/18/2022    GLUCOSE 102 (H) 04/18/2022    CALCIUM 9.7 04/18/2022    PROT 7.5 04/18/2022    LABALBU 3.8 04/18/2022    BILITOT 0.5 04/18/2022    ALKPHOS 139 (H) 04/18/2022    AST 21 04/18/2022    ALT 12 04/18/2022    LABGLOM 46 04/18/2022    GFRAA 56 04/18/2022     Lab Results   Component Value Date    CEA 2.2 04/18/2022     Impression/Plan:  67 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 KRAS Mutation: Mutation detected. BRAF Mutation: Mutation not detected.   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 CT scan abdomen/pelvis on 10/26/2015 revealed small nodules at the lung bases, extensive liver lesions consistent with metastatic rectosigmoid cancer. CEA 3488 on 10/30/2015. Bone scan on 11/10/2015 noted no metastatic disease. CT chest on 11/10/2015 revealed Multiple pulmonary nodules in the upper and lower lobes consistent with metastatic disease; Hepatic metastasis also visualized. 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 Mediport was placed. Cycle # 1 of FOLFOX + Avastin was on 11/23/2015. CEA was 4555 on 11/23/2015. Cycle # 2 of FOLFOX + Avastin was on 12/08/2015. CEA was 3160 on 12/08/2015. Cycle # 3 of FOLFOX + Avastin was on 12/22/2015. CEA was 2516 on 12/21/2015. Cycle # 4 of FOLFOX + Avastin was on 01/05/2016. CEA was 2098 on 01/05/2015. Cycle # 5 of FOLFOX + Avastin was on 01/19/2016. CEA was 1511 on 01/05/2015.     -Bone scan on 01/26/2016 noted no metastatic disease. CT chest on 01/26/2016 revealed Significant response to treatment with no visible residual nodules. CT scan abdomen/pelvis on 01/26/2016 noted Interval decreased size of multiple masses in the liver compatible with treatment response. Continue another 2 months of FOLFOX + Avastin and repeat scans. Cycle # 6 of FOLFOX + Avastin was on 02/02/2016.  on 02/02/2016. Cycle # 7 of FOLFOX + Avastin was on 02/16/2016.  on 02/16/2016. Cycle # 8 of FOLFOX + Avastin was on 03/01/2016.  on 03/01/2016. Cycle # 9 of FOLFOX + Avastin was on 03/15/2016.  on 03/15/2016. Cycle # 10 of FOLFOX + Avastin was on 03/29/2016. .4 on 03/29/2016. Cycle # 11 of FOLFOX + Avastin was on 04/12/2016.  .4 on 04/12/2016.     Re-staging scans on 04/19/2016: CT Chest: clear lungs; no evidence of recurring pulmonary nodule; CT Abdomen/Pelvis: Further interval decrease in size of the multiple metastatic hepatic lesions, the largest lesion now measures 3.9 x 3.5 cm and previously measured 4.5 cm in maximum diameter. No mesenteric lymphadenopathy is identified; Bone Scan: No evidence of osseous metastasis. Continue same regimen and re-stage in 2-3 months. Cycle # 12 FOLFOX + Avastin was on 04/26/2016. .5 on 04/26/2016. Cycle # 13 FOLFOX + Avastin was on 05/10/2016. .3 on 05/10/2016. Cycle # 14 FOLFOX+AVASTIN was on 05/24/2016. .5 on 05/24/2016. Cycle # 15 FOLFOX + Avastin was on 06/07/2016. CEA 90.5 on 06/07/2016. Admitted to [facility] 06/13/2016-06/16/2016 for abdominal pain: EGD noted 1.5 cm clean based duodenal bulb ulceration s/p epinephrine and bicap per Dr. Osa Holter. No active bleeding. A. Stomach, biopsy: Mild chronic gastritis, immunostain negative for Helicobacter  B. Esophagus, biopsy: Gastric glandular mucosa with prominent ntestinal metaplasia ([place]'s epithelium), negative for epithelial dysplasia, esophageal squamous mucosa not identified     Cycle # 16 FOLFOX (discontinued avastin (bevacizumab) given association of peptic ulcer disease and known association of GI perforation.) was on 06/21/2016. CEA 47 on 06/21/2016. Cycle # 17 FOLFOX was on 07/05/2016. CEA 52.6 on 07/05/2016. CEA 47.6 on 07/19/2016.     Re-staging scans 07/19/2106: CT Chest negative for metastatic disease. CT Abdomen/Pelvis: Stable hepatic lesions; Question new mural thickening in the cecum. Bone Scan: New Let hip lesion suspicious for bone metastasis.     Increased CEA; new mural thickening in the cecum and new left hip lesion suspicious for bone metastasis are consistent with disease progression; He derived maximum benefit from FOLFOX/AVASTIN. D/C FOLFOX/AVASTIN. We recommended FOLFIRI second line therapy. Cycle # 1 FOLFIRI was on 08/02/2016. CEA 40.8 on 08/02/2016. Xgeva q4 weeks started on 08/02/2016. Cycle # 2 FOLFIRI was on 08/16/2016. CEA 31.7 on 08/16/2016.   Cycle # 3 FOLFIRI (added Avastin) was on 08/30/2016 given that ulcers healed on EGD 08/15/2016 by Dr. Yung Palacios; Protonix bid since avastin re-started. Colonoscopy on 08/29/2016 (to look at the cecal area) unremarkable. CEA 26.7 on 08/30/2016. Cycle # 4 FOLFIRI/Avastin was on 09/13/2016. CEA 23.4 on 09/13/2016. Cycle # 5 FOLFIRI/Avastin was on 09/27/2016. CEA 22.3 on 09/27/2016. Cycle # 6 FOLFIRI/Avastin was on 10/11/2016. CEA 18.4 on 10/11/2016.      Bone scan 10/21/2016 stable bone metastasis; ? New lesion anterior left sixth rib likely interval healing fracture. CT abdomen/pelvis revealed stable hepatic metastasis. CT chest negative for metastatic disease. Continue FOLFIRI/Avastin and repeat scans in 3 months. Cycle # 7 FOLFIRI/Avastin was on 10/25/2016. CEA 16.1 on 10/25/2016. Cycle # 8 FOLFIRI/Avastin was on 11/08/2016. CEA 15.7 on 11/08/2016. Cycle # 9 FOLFIRI/Avastin was on 11/29/2016. CEA 13.6 on 11/29/2016. Cycle # 10 FOLFIRI/Avastin was on 12/13/2016. CEA 10.6 on 12/13/2016. Cycle # 11 FOLFIRI/Avastin was on 12/27/2016. CEA 11.1 on 12/27/2016. Cycle # 12 FOLFIRI/Avastin was on 01/10/2017. CEA 9.7 on 01/10/2017. CT chest 01/23/2017 No new pulmonary nodule or lymphadenopathy. Patchy groundglass opacities within the left lower lobe, suggestive of infectious or inflammatory etiology. Followup CT chest in 3 months. Slight increased linear sclerosis along the left anterior sixth rib corresponding to an area of increased uptake on the prior bone scan from 10/21/2016, favored to be related to interval healing changes of a nondisplaced fracture. CT abdomen/pelvis on 01/23/2017 Redemonstration of multiple hepatic metastases, which are similar compared to the prior CT from 10/21/2016. Redemonstration of area of increased sclerosis along the right acetabulum suspicious for metastatic disease.    Bone scan on 01/23/2017 Stable subtle uptake within the left proximal diaphysis, again suggestive of metastatic disease  Decreased subtle uptake within the medial right acetabulum. Decreased uptake within the left anterior sixth rib corresponding to linear sclerosis on the recent CT, favored to be related to an joint rib fracture. Continue FOLFIRI + Avastin and repeat scans in 3 months. Cycle # 13 FOLFIRI + Avastin was on 01/24/2017. Cycle # 14 FOLFIRI + Avastin was on 02/07/2017. Cycle # 15 FOLFIRI + Avastin was on 02/21/2017. Cycle # 16 FOLFIRI + Avastin was on 03/07/2017. Cycle # 17 FOLFIRI + Avastin was on 03/21/2017. Cycle # 18 FOLFIRI + Avastin was on 04/04/2017. CT chest 04/17/2017 negative for metastatic disease. CT abdomen/pelvis 04/17/2017 Stable hypodense metastatic lesions within the liver. Stable area of increased sclerosis along the right acetabulum  Bone scan 04/17/2017 Stable subtle uptake within the left proximal femoral diaphysis; No definite abnormal uptake in the region of a sclerotic lesion along the posterior column of the right acetabulum noted on CT. Stable slight uptake within the left anterior sixth rib corresponding to linear sclerosis on the recent CT, favored to be related to a fracture. Continue FOLFIRI + Avastin and repeat scans in 3 months. Cycle # 19 FOLFIRI + Avastin was on 04/18/2017. CEA 7.5 on 04/18/2017. Cycle # 20 FOLFIRI + Avastin was on 05/02/2017. CEA 7.7 on 05/02/2017. Cycle # 21 FOLFIRI + Avastin was on 05/16/2017. CEA 7.1 on 05/16/2017. Cycle # 22 FOLFIRI + Avastin was on 05/30/2017. CEA 8.2 on 05/30/2017. Cycle # 23 FOLFIRI + Avastin was on 06/13/2017. CEA 7.7 on 06/13/2017. Cycle # 24 FOLFIRI + Avastin was on 06/27/2017. CEA 6.6 on 06/27/2017. Re-staging scans 07/05/2017:  Bone scan stable proximal left femoral diaphysis, right acetabulum, and left anterior sixth rib lesions;  CT abdomen/pelvis stable hypodense metastatic lesions within the liver; CT chest without convincing evidence of metastatic disease. Cycle # 25 FOLFIRI + Avastin was on 07/11/2017. CEA 6.3 on 07/11/2017.   Cycle # 26 FOLFIRI + Avastin was on 07/25/2017. CEA 7.3 on 07/25/2017. Cycle # 27 FOLFIRI + Avastin was on 08/08/2017. CEA 6.5 on 08/08/2017. Cycle # 28 FOLFIRI + Avastin was on 08/22/2017. CEA 5.9 on 08/22/2017. Cycle # 29 FOLFIRI + Avastin was on 09/05/2017. CEA 6.3 on 09/05/2017. Cycle # 30 FOLFIRI + Avastin was on 09/19/2017. CEA 6.3 on 09/19/2017. Bone scan 09/26/2017 stable. CT abdomen/pelvis on 09/26/2017 Stable hypodense mass in the liver. Stable sclerotic lesion in the acetabulum. CT chest 09/26/2017 negative for metastatic disease. Cycle # 31 FOLFIRI + Avastin was on 10/03/2017. CEA 7.4 on 10/03/2017. Cycle # 32 FOLFIRI + Avastin was on 10/17/2017. CEA 6.0 on 10/17/2017. Cycle # 33 FOLFIRI + Avastin was on 10/31/2017. CEA 6.8 on 10/31/2017. Cycle # 34 FOLFIRI + Avastin was on 11/14/2017. CEA 7.2 on 11/14/2017. Cycle # 35 FOLFIRI + Avastin was on 11/28/2017. CEA 5.1 on 11/28/2017. Cycle # 36 FOLFIRI + Avastin was on 12/12/2017. CEA 6.1 on 12/12/2017. Bone scan 12/22/2017 noted no evidence of metastatic disease to the axial or appendicular skeleton. CT chest 12/22/2017 noted no evidence of metastatic disease. CT abdomen/pelvis 12/22/2017 noted stable hypodense lesions in the liver. Continue FOLFIRI + Avastin and repeat scans in 3 months. Cycle # 37 FOLFIRI + Avastin was on 12/27/2018. CEA 6.7 on 12/27/2017. Cycle # 38 FOLFIRI + Avastin was on 01/09/2018. CEA 5.0 on 01/09/2018. Cycle # 39 FOLFIRI + Avastin was on 01/23/2018. CEA 6.5 on 01/23/2018. Cycle # 40 FOLFIRI + Avastin was on 02/06/2018. CEA 6.9 on 02/06/2018. Cycle # 41 FOLFIRI + Avastin was on 02/20/2018. CEA 6.4 on 02/20/2018. Cycle # 42 FOLFIRI + Avastin was on 03/06/2018. CEA 6.6 on 03/06/2018. Cycle # 43 FOLFIRI + Avastin was on 03/20/2018. CEA 5.7 on 03/20/2018. Bone scan on 03/26/2018 negative for metastatic disease. CT chest 03/26/2018 noted ? new 7 mm nodule in LUCY.    CT abdomen/pelvis 03/26/2018 noted stable hypodense lesions in the liver. ? New small ascites in the abdomen. Patient wants to continue to monitor the lung finding and repeat scans in 2 months instead of changing the current chemotherapy regimen to oral Lonsurf. Cycle # 44 FOLFIRI + Avastin was on 04/03/2018. CEA 6.3 on 04/03/2018. Cycle # 45 FOLFIRI + Avastin was on 04/24/2018. CEA 5.3 on 04/24/2018. Cycle # 46 FOLFIRI + Avastin was on 05/08/2018. CEA 5.4 on 05/08/2018. Cycle # 47 FOLFIRI + Avastin was on 05/22/2018. CEA 6.1 on 05/22/2018. CT chest 05/31/2018 noted stable nodule in LUCY. No evidence of worsening malignancy. CT abdomen/pelvis on 05/31/2018 noted stable liver metastasis. No evidence of worsening malignancy. Continue FOLFIRI + Avastin and repeat scans in 3 months. Cycle # 48 FOLFIRI + Avastin was on 06/06/2018. CEA 6.3 on 06/05/2018. Cycle # 49 FOLFIRI + Avastin was on 06/19/2018. CEA 6.1 on 06/19/2018. Cycle # 50 FOLFIRI + Avastin was on 07/03/2018. CEA 7.4 on 07/03/2018. Cycle # 51 FOLFIRI + Avastin was on 07/24/2018. CEA 6.7 on 07/24/2018. Cycle # 52 FOLFIRI + Avastin was on 08/14/2018. CEA 6.8 on 08/14/2018. Cycle # 53 FOLFIRI + Avastin was on 08/28/2018. CEA 6.5 on 08/27/2018. CT chest 09/06/2018 noted interval decrease in size of LUCY measuring up to 4 mm, suggestive of treatment response. No mediastinal or hilar LN  CT abdomen/pelvis 09/06/2018 Slight interval increase in size of a previously noted metastatic lesion within the right hepatic lobe. This may be related to differences in phase of enhancement compared to the most recent study from 5/31/2018, the lesion remains decreased in size compared to the more previous studies. No abdominal, retroperitoneal, or pelvic lymphadenopathy. Continue FOLFIRI + Avastin and repeat scans in 2 months. Cycle # 54 FOLFIRI + Avastin was on 09/11/2018. CEA 6.4 on 09/10/2018. Cycle # 55 FOLFIRI + Avastin was on 09/25/2018. CEA 6.4 on 09/25/2018.    Cycle # 56 FOLFIRI + Avastin was on 10/09/2018. CEA 6.7 on 10/08/2018. Cycle # 57 FOLFIRI + Avastin was on 10/23/2018. CEA 7.2 on 10/22/2018. CT chest 11/02/2018 stable 3 mm nodule within LUCY. No new right and left lung nodule. No mediastinal or osseous lesion. CT abdomen/pelvis 11/02/2018 stable metastatic disease to liver. No new metastatic disease identified. No mesenteric or retroperitoneal LN. No gross colonic lesion identified. Continue FOLFIRI + Avastin and repeat scans in 3 months. Cycle # 58 FOLFIRI + Avastin was on 11/06/2018. CEA 7.6 on 11/05/2018. Cycle # 59 FOLFIRI + Avastin was on 11/27/2018. CEA 6.9 on 11/26/2018. Cycle # 60 FOLFIRI + Avastin was on 12/11/2018. CEA 6.7 on 12/11/2018. Cycle # 61 FOLFIRI + Avastin was on 01/08/2019. CEA 5.6 on 01/07/2019. Cycle # 62 FOLFIRI + Avastin was on 01/29/2019. CEA 4.6 on 01/28/2019. Cycle # 63 FOLFIRI + Avastin was on 02/12/2019. CEA 4.3 on 02/11/2019. CT chest 02/21/2019 no evidence of metastatic disease. CT abdomen/pelvis 02/21/2019 stable hypodense liver lesions. No evidence of worsening metastatic disease. 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 Ordered MRI thoracic spine and referred to neurosurgery team.  Cycle # 64 FOLFIRI + Avastin was on 02/26/2019. CEA 4.7 on 02/25/2019. Cycle # 65 FOLFIRI + Avastin was on 03/12/2019. CEA 4.0 on 03/11/2019. Cycle # 66 FOLFIRI + Avastin was on 03/26/2019. CEA 4.7 on 03/25/2019. Cycle # 67 FOLFIRI + Avastin was on 04/09/2019. CEA 5.6 on 04/08/2019. Cycle # 68 FOLFIRI + Avastin was on 04/30/2019. CEA 4.9 on 04/29/2019. Cycle # 69 FOLFIRI + Avastin was on 05/14/2019. CEA 5.3 on 05/14/2019. CT chest 05/23/2019 stable small lung nodule with no evidence of metastatic disease. CT abdomen/pelvis 05/23/2019 Decrease conspicuity of the liver lesions. No new lesions seen. Stable splenomegaly. Continue FOLFIRI + Avastin and repeat scans in 3 months.   Cycle # 70 FOLFIRI + Avastin was on mediastinal or hilar adenopathy. CT abdomen pelvis 2/28/2020 no enhancing lesions seen within the liver to suggest metastatic disease. Wall thickening of the rectosigmoid junction. Images reviewed. Continue FOLFIRI Avastin and repeat scans in 3 months  EGD by Dr. Bird Mulligan 03/02/2020 showing no masses or lesions. Cycle # 85 FOLFIRI + Avastin was on 03/03/2020. CEA 7.4 on 03/02/2020. Cycle # 86 FOLFIRI + Avastin was on 03/17/2020. CEA 4.5 on 03/16/2020. Colonoscopy on 03/23/2020 by Dr. Bird Mulligan unremarkable (records requested). Cycle # 87 FOLFIRI + Avastin was on 03/31/2020. CEA 4.1 on 03/30/2020. Cycle # 88 FOLFIRI + Avastin was on 04/21/2020. CEA 6.4 on 04/20/2020. Cycle # 89 FOLFIRI + Avastin was on 05/05/2020. CEA 5.8 on 05/04/2020. Cycle # 90 FOLFIRI + Avastin was on 06/02/2020. CEA 5.5 on 06/01/2020. Cycle # 91 FOLFIRI + Avastin was on 06/16/2020. CEA 5.6 on 06/15/2020. CT chest 06/23/2020 noted no metastatic disease in the chest.   CT abdomen/pelvis 06/23/2020 Stable small liver lesions measuring up to 5 mm since 2019.   22 mm round mass in segment 8 of the liver with central high density stable from December 2019), previously measuring up to 34 mm in 2017. No additional metastatic disease in the abdomen or pelvis. Small amount of ascites. Overall stable disease. Continue FOLFIRI + Avastin and repeat scans in 2-3 months. Cycle # 92 FOLFIRI + Avastin was on 07/07/2020. CEA 5.7 on 07/06/2020. Cycle # 93 FOLFIRI + Avastin was on 07/21/2020. CEA 5.9 on 07/20/2020. Cycle # 94 FOLFIRI + Avastin was on 08/04/2020. CEA 5.5 on 08/04/2020. Cycle # 95 FOLFIRI + Avastin was on 08/25/2020. CEA 6.1 on 08/24/2020. CT chest 9/2/2020 stable unremarkable enhanced CT of the thorax. There is no metastatic disease to the lungs. CT abdomen pelvis on 9/2/2020 stable 2.2 cm low attenuated lesion within the central aspect of the right hepatic lobe favored to represent treated metastatic disease.   No new hepatic lesion is identified. Stable splenomegaly  There is no appreciable colonic lesion or stricture. Pericholecystic fluid of uncertain etiology. Laparoscopic cholecystectomy with normal cholangiogram 10/27/20  Gallbladder: Chronic cholecystitis and cholelithiasis.  Unremarkable regional lymph node. 11/13/2020; Seen by Dr. Stephanie Cash from General surgery team; cleared to re-start chemotherapy. Cycle # 96 FOLFIRI + Avastin was on 11/17/2020. CEA 3.6 on 11/16/2020. Cycle # 97 FOLFIRI + Avastin was on 12/01/2020. CEA 3.5 on 11/30/2020. Cycle # 98 FOLFIRI + Avastin was on 12/15/2020. CEA 4.3 on 12/15/2020. Cycle # 99 FOLFIRI + Avastin was on 01/05/2021. CEA 4.7 on 01/04/2021. CT chest 01/13/2021 negative for metastatic disease. CT abdomen/pelvis 01/13/2021 Unchanged central hepatic lesion. No specific signs of abdominopelvic metastatic disease. Continue FOLFIRI + Avastin and repeat scans in 3 months. Cycle # 100 FOLFIRI + Avastin was on 01/19/2021. CEA 4.7 on 01/18/2021. Cycle # 101 FOLFIRI + Avastin was on 02/02/2021. CEA 5.2 on 02/01/2021. Cycle # 102 FOLFIRI + Avastin was on 02/16/2021. CEA 5.6 on 02/15/2021. Cycle # 103 FOLFIRI + Avastin was on 03/02/2021. Cycle # 104 FOLFIRI (20% dose reduced due to significant toxicity) + Avastin was on 03/16/2021. Cycle # 105 FOLFIRI (same dose as cycle # 104) + Avastin was on 03/30/2021. CEA 6.5 on 03/29/2021. Cycle # 106 FOLFIRI (same dose as cycle # 104) + Avastin was on 04/13/2021. CEA 6.0 on 04/12/2021  CT chest 04/20/2021 no evidence of metastatic disease. CT abdomen/pelvis 04/20/2021 No evidence of progressive metastatic disease. Stable 2 cm lesion in the right lobe of the liver, likely representing treated metastatic disease. Imaging reviewed. Continue FOLFIRI + Avastin and repeat scans in 3 months  Cycle # 107 FOLFIRI + Avastin was on 04/27/2021  Cycle # 108 FOLFIRI + Avastin was on 05/11/2021. CEA 6.4 on 05/10/2021.    Cycle # 109 FOLFIRI (held Avastin due to upcoming surgery) on 05/25/2021. CEA 6.3 on 5/24/21  Cycle # 110 FOLFIRI (held Avastin due to upcoming surgery) on 06/08/2021. CEA 6.3 on 6/07/21. CEA 5.8 on 06/28/2021. Right inguinal/scrotol hernia repair on 07/13/2021 with Dr Loraine Haro with folely removal on 07/23/2021. He developed fever on 07/24/2021 and was brought to [address] [place], [place] [address] ER via EMS; Sepsis secondary to urinary tract infection; Completed Abx  CT chest 08/13/2021 no sign of recurrent or metastatic disease. CT abdomen/pelvis 08/13/2021 unchanged hepatic lesions. Splenomegaly and secondary signs of portal venous hypertension  CEA 4.0 on 08/16/2021  CEA 4.1 on 08/30/2021  Cycle # 111 FOLFIRI was on 08/31/2021. HBP team for evaluation of liver lesions; His lesion does appear to be a hemangioma as his prior metastatic deposits in his liver were hypo attenuating compared to this CT which is hyper attenuating. compared to his prior CT scans he has disappearing liver metastasis. MRI liver on 09/17/2021 No suspicious liver lesion identified. Colonoscopy GI Dr. Chris Love on 09/20/2021 unremarkable  CEA 3.3 on 09/27/2021. CEA 3.2 on 10/25/2021. CT chest 11/08/2021: Similar chronic appearing findings.  No acute process or evidence of metastatic disease identified. CT Abdomen/pelvis 11/08/2021: No significant change in the appearance of the liver. Similar appearance of splenomegaly. No interval change. CEA 2.7 on 11/22/2021. CEA 2.5 on 12/27/2021  CEA 2.4 on 01/24/2022  CT chest/abdomen/pelvis 02/04/2022 Stable CT of the chest abdomen/ pelvis with the a 1.4 cm enhancing nodule in the right hepatic lobe which is marginally improved. Imaging reviewed. CEA 2.3 on 02/21/2022  CEA 2.4 on 03/21/2022  CEA 2.2 on 04/18/2022  Labs reviewed.  Scans ordered for May 2022    RTC 4 weeks with prior scans and labs   MSI testing noted no mismatch repair protein loss of expression    4/19/2022  Herber Palacios MD  Board Certified Medical Oncologist

## 2022-04-19 NOTE — PROGRESS NOTES
PORT FLUSH    Patient presents to clinic for [facility] today. left  SQ port accessed per policy using 20 Campos needle for good blood return. Site flushed easily with 10 mL NSS followed by 5 mL Heparin solution 100 units/ml rinse prior to de-access. Dry sterile dressing to area. Tolerated procedure well. Encouraged to schedule port flush every 4 weeks.

## 2022-05-11 ENCOUNTER — HOSPITAL ENCOUNTER (OUTPATIENT)
Dept: CT IMAGING | Age: 72
Discharge: HOME | End: 2022-05-11
Payer: MEDICARE

## 2022-05-11 DIAGNOSIS — C20 RECTAL CANCER METASTASIZED TO LIVER (HCC): ICD-10-CM

## 2022-05-11 DIAGNOSIS — C78.7 RECTAL CANCER METASTASIZED TO LIVER (HCC): ICD-10-CM

## 2022-05-11 PROCEDURE — 71260 CT THORAX DX C+: CPT

## 2022-05-11 PROCEDURE — 6360000004 HC RX CONTRAST MEDICATION: Performed by: RADIOLOGY

## 2022-05-11 PROCEDURE — 74177 CT ABD & PELVIS W/CONTRAST: CPT

## 2022-05-11 RX ADMIN — IOHEXOL 50 ML: 240 INJECTION, SOLUTION INTRATHECAL; INTRAVASCULAR; INTRAVENOUS; ORAL at 13:41

## 2022-05-11 RX ADMIN — IOPAMIDOL 75 ML: 755 INJECTION, SOLUTION INTRAVENOUS at 13:41

## 2022-05-16 ENCOUNTER — HOSPITAL ENCOUNTER (OUTPATIENT)
Age: 72
Discharge: HOME | End: 2022-05-16
Payer: MEDICARE

## 2022-05-16 DIAGNOSIS — C78.7 RECTAL CANCER METASTASIZED TO LIVER (HCC): ICD-10-CM

## 2022-05-16 DIAGNOSIS — C20 RECTAL CANCER METASTASIZED TO LIVER (HCC): ICD-10-CM

## 2022-05-16 LAB
ALBUMIN SERPL-MCNC: 3.6 G/DL (ref 3.5–5.2)
ALP BLD-CCNC: 129 U/L (ref 40–129)
ALT SERPL-CCNC: 11 U/L (ref 0–40)
ANION GAP SERPL CALCULATED.3IONS-SCNC: 10 MMOL/L (ref 7–16)
AST SERPL-CCNC: 23 U/L (ref 0–39)
BASOPHILS ABSOLUTE: 0.02 E9/L (ref 0–0.2)
BASOPHILS RELATIVE PERCENT: 0.4 % (ref 0–2)
BILIRUB SERPL-MCNC: 0.5 MG/DL (ref 0–1.2)
BUN BLDV-MCNC: 25 MG/DL (ref 6–23)
CALCIUM SERPL-MCNC: 9.9 MG/DL (ref 8.6–10.2)
CEA: 2.2 NG/ML (ref 0–5.2)
CHLORIDE BLD-SCNC: 105 MMOL/L (ref 98–107)
CO2: 26 MMOL/L (ref 22–29)
CREAT SERPL-MCNC: 1.5 MG/DL (ref 0.7–1.2)
EOSINOPHILS ABSOLUTE: 0.1 E9/L (ref 0.05–0.5)
EOSINOPHILS RELATIVE PERCENT: 2.1 % (ref 0–6)
GFR AFRICAN AMERICAN: 56
GFR NON-AFRICAN AMERICAN: 46 ML/MIN/1.73
GLUCOSE BLD-MCNC: 98 MG/DL (ref 74–99)
HCT VFR BLD CALC: 30.3 % (ref 37–54)
HEMOGLOBIN: 10.1 G/DL (ref 12.5–16.5)
IMMATURE GRANULOCYTES #: 0.02 E9/L
IMMATURE GRANULOCYTES %: 0.4 % (ref 0–5)
LYMPHOCYTES ABSOLUTE: 0.61 E9/L (ref 1.5–4)
LYMPHOCYTES RELATIVE PERCENT: 13 % (ref 20–42)
MCH RBC QN AUTO: 30.8 PG (ref 26–35)
MCHC RBC AUTO-ENTMCNC: 33.3 % (ref 32–34.5)
MCV RBC AUTO: 92.4 FL (ref 80–99.9)
MONOCYTES ABSOLUTE: 0.34 E9/L (ref 0.1–0.95)
MONOCYTES RELATIVE PERCENT: 7.2 % (ref 2–12)
NEUTROPHILS ABSOLUTE: 3.62 E9/L (ref 1.8–7.3)
NEUTROPHILS RELATIVE PERCENT: 76.9 % (ref 43–80)
PDW BLD-RTO: 13.2 FL (ref 11.5–15)
PLATELET # BLD: 110 E9/L (ref 130–450)
PMV BLD AUTO: 9.5 FL (ref 7–12)
POTASSIUM SERPL-SCNC: 4.1 MMOL/L (ref 3.5–5)
RBC # BLD: 3.28 E12/L (ref 3.8–5.8)
SODIUM BLD-SCNC: 141 MMOL/L (ref 132–146)
TOTAL PROTEIN: 7.4 G/DL (ref 6.4–8.3)
WBC # BLD: 4.7 E9/L (ref 4.5–11.5)

## 2022-05-16 PROCEDURE — 80053 COMPREHEN METABOLIC PANEL: CPT

## 2022-05-16 PROCEDURE — 36415 COLL VENOUS BLD VENIPUNCTURE: CPT

## 2022-05-16 PROCEDURE — 82378 CARCINOEMBRYONIC ANTIGEN: CPT

## 2022-05-16 PROCEDURE — 85025 COMPLETE CBC W/AUTO DIFF WBC: CPT

## 2022-05-17 ENCOUNTER — OFFICE VISIT (OUTPATIENT)
Dept: ONCOLOGY | Age: 72
End: 2022-05-17
Payer: MEDICARE

## 2022-05-17 ENCOUNTER — HOSPITAL ENCOUNTER (OUTPATIENT)
Dept: INFUSION THERAPY | Age: 72
Discharge: HOME | End: 2022-05-17
Payer: MEDICARE

## 2022-05-17 ENCOUNTER — TELEPHONE (OUTPATIENT)
Dept: CASE MANAGEMENT | Age: 72
End: 2022-05-17

## 2022-05-17 VITALS
SYSTOLIC BLOOD PRESSURE: 129 MMHG | OXYGEN SATURATION: 100 % | HEART RATE: 68 BPM | HEIGHT: 73 IN | BODY MASS INDEX: 28.49 KG/M2 | WEIGHT: 215 LBS | TEMPERATURE: 98.8 F | DIASTOLIC BLOOD PRESSURE: 70 MMHG

## 2022-05-17 DIAGNOSIS — C78.7 RECTAL CANCER METASTASIZED TO LIVER (HCC): Primary | ICD-10-CM

## 2022-05-17 DIAGNOSIS — C20 RECTAL CANCER METASTASIZED TO LIVER (HCC): ICD-10-CM

## 2022-05-17 DIAGNOSIS — C20 RECTAL CANCER METASTASIZED TO LIVER (HCC): Primary | ICD-10-CM

## 2022-05-17 DIAGNOSIS — C78.7 RECTAL CANCER METASTASIZED TO LIVER (HCC): ICD-10-CM

## 2022-05-17 PROCEDURE — 6360000002 HC RX W HCPCS: Performed by: INTERNAL MEDICINE

## 2022-05-17 PROCEDURE — 99215 OFFICE O/P EST HI 40 MIN: CPT | Performed by: INTERNAL MEDICINE

## 2022-05-17 PROCEDURE — 96523 IRRIG DRUG DELIVERY DEVICE: CPT

## 2022-05-17 PROCEDURE — 99214 OFFICE O/P EST MOD 30 MIN: CPT

## 2022-05-17 PROCEDURE — 2580000003 HC RX 258: Performed by: INTERNAL MEDICINE

## 2022-05-17 RX ORDER — AMLODIPINE BESYLATE 5 MG/1
TABLET ORAL
COMMUNITY
Start: 2022-05-12

## 2022-05-17 RX ORDER — HEPARIN SODIUM (PORCINE) LOCK FLUSH IV SOLN 100 UNIT/ML 100 UNIT/ML
500 SOLUTION INTRAVENOUS PRN
Status: DISCONTINUED | OUTPATIENT
Start: 2022-05-17 | End: 2022-05-18 | Stop reason: HOSPADM

## 2022-05-17 RX ORDER — HEPARIN SODIUM (PORCINE) LOCK FLUSH IV SOLN 100 UNIT/ML 100 UNIT/ML
500 SOLUTION INTRAVENOUS PRN
Status: CANCELLED | OUTPATIENT
Start: 2022-05-17

## 2022-05-17 RX ORDER — SODIUM CHLORIDE 0.9 % (FLUSH) 0.9 %
10 SYRINGE (ML) INJECTION PRN
Status: DISCONTINUED | OUTPATIENT
Start: 2022-05-17 | End: 2022-05-18 | Stop reason: HOSPADM

## 2022-05-17 RX ORDER — SODIUM CHLORIDE 0.9 % (FLUSH) 0.9 %
10 SYRINGE (ML) INJECTION PRN
Status: CANCELLED | OUTPATIENT
Start: 2022-05-17

## 2022-05-17 RX ADMIN — Medication 500 UNITS: at 08:58

## 2022-05-17 RX ADMIN — SODIUM CHLORIDE, PRESERVATIVE FREE 10 ML: 5 INJECTION INTRAVENOUS at 08:58

## 2022-05-17 NOTE — TELEPHONE ENCOUNTER
Completed patient's [facility] test requisition per Dr. Keenan Coe order. Completed test order online via [facility]'s portal for patient's pathology specimen (tissue). Patient's insurance card, face sheet, and pathology report were included with the online order. Copy of order placed in patient's Epic chart. GINETTE ValeW, RN, OCN  Oncology Nurse Navigator

## 2022-05-17 NOTE — PROGRESS NOTES
[place] [id number]           Attending Clinic Note     Reason for Visit: Follow-up on a patient with Metastatic Rectal Cancer.     PCP: Aury Goins MD     History of Present Illness:  65 y/o  male who was referred to see Dr. Burnice Fleischer (GI team) for evaluation of bright red blood per rectum, mild anemia and change in bowel habits with diarrhea. CEA 2640 on 10/19/2015. AlcP 299 AST 57 ALT 75 on 10/19/2015. Colonoscopy in 2013 noted no significant polyps, colitis or lesions at that time. Denies any Family History of colorectal cancer or polyps.     Colonoscopy on 10/19/2015 revealed:  1. Ascending polyp, 8 mm, hot snare: Tubulovillous adenoma. 2. Transverse polyp, 1 cm, hot snare: Serrated polyp most consistent with sessile serrated adenoma. 3. Five splenic flexure polyps, three - 5 mm, 7 mm, 8 mm, hot snare and biopsy: Four segments of Tubular Adenoma  4. Descending polyp, 5 mm, biopsy: Tubular adenoma. 5. Sigmoid polyp, 4 mm biopsy, Serrated polyp most consistent with sessile serrated adenoma. 6. Two rectal polyps, 4 mm biopsy: Serrated polyp most consistent with hyperplastic polyp. 7. Rectosigmoid colon mass (large mass approximately 65% circumference of the lumen; Very friable, firm and hard): Tubulovillous adenoma with associated focal erosion and fibroplasia.      CT scan abdomen/pelvis on 10/26/2015:  1. Small nodules at lung bases likely represent metastatic colon   cancer. 2. Extensive likely metastatic colon cancer throughout the liver.      3. Mild mural thickening and luminal narrowing in the   terminal ileum, otherwise nonspecific.      Colonoscopy with snare removal rectal mass was performed by Dr. Claudia Mercado. Pathology proved:  Rectal polyp: Invasive adenocarcinoma involving villous adenoma and extending to the cauterized edge of excision. KRAS Mutation: Mutation detected. BRAF Mutation: Mutation not detected.   NRAS osseous metastasis. Continue same regimen and re-stage in 2-3 months. Cycle # 12 FOLFOX + Avastin was on 04/26/2016. .5 on 04/26/2016. Cycle # 13 FOLFOX + Avastin was on 05/10/2016. .3 on 05/10/2016. Cycle # 14 FOLFOX+AVASTIN was on 05/24/2016. .5 on 05/24/2016. Cycle # 15 FOLFOX + Avastin was on 06/07/2016. CEA 90.5 on 06/07/2016. Admitted to [facility] 06/13/2016-06/16/2016 for abdominal pain: EGD noted 1.5 cm clean based duodenal bulb ulceration s/p epinephrine and bicap per Dr. Skip Willis. No active bleeding. A. Stomach, biopsy: Mild chronic gastritis, immunostain negative for Helicobacter  B. Esophagus, biopsy: Gastric glandular mucosa with prominent intestinal metaplasia ([place]'s epithelium), negative for epithelial dysplasia, esophageal squamous mucosa not identified  Cycle # 16 FOLFOX (discontinued avastin (bevacizumab) given association of peptic ulcer disease and known association of GI perforation) was on 06/21/2016. Cycle # 17 FOLFOX was on 07/05/2016. CEA 52.6 on 07/19/2016. Cycle # 17 FOLFOX was on 07/05/2016. CEA 52.6 on 07/05/2016. CEA 47.6 on 07/19/2016.     Re-staging scans 07/19/2106: CT Chest negative for metastatic disease. CT Abdomen/Pelvis: Stable hepatic lesions; Question new mural thickening in the cecum. Bone Scan: New Let hip lesion suspicious for bone metastasis.     Increased CEA; new mural thickening in the cecum and new left hip lesion suspicious for bone metastasis are consistent with disease progression; He derived maximum benefit from FOLFOX/AVASTIN. D/C FOLFOX/AVASTIN. We recommended FOLFIRI second line therapy. Cycle # 1 FOLFIRI was on 08/02/2016. CEA 40.8 on 08/02/2016. Xgeva q4 weeks started on 08/02/2016. Cycle # 2 FOLFIRI was on 08/16/2016. CEA 31.7 on 08/16/2016. Cycle # 3 FOLFIRI (added Avastin) was on 08/30/2016 given that ulcers healed on EGD 08/15/2016 by Dr. Harriett Jones; Protonix bid since avastin re-started.    Colonoscopy on 08/29/2016 (to look at the cecal area) unremarkable. CEA 26.7 on 08/30/2016. Cycle # 4 FOLFIRI/Avastin was on 09/13/2016. CEA 23.4 on 09/13/2016. Cycle # 5 FOLFIRI/Avastin was on 09/27/2016. CEA 22.3 on 09/27/2016. Cycle # 6 FOLFIRI/Avastin was on 10/11/2016. CEA 18.4 on 10/11/2016.      Bone scan 10/21/2016 stable bone metastasis; ? New lesion anterior left sixth rib likely interval healing fracture. CT abdomen/pelvis revealed stable hepatic metastasis. CT chest negative for metastatic disease. Continue FOLFIRI/Avastin and repeat scans in 3 months. Cycle # 7 FOLFIRI/Avastin was on 10/25/2016. CEA 16.1  Cycle # 8 FOLFIRI/Avastin was on 11/08/2016. CEA 15.7  Cycle # 9 FOLFIRI/Avastin was on 11/29/2016. CEA 13.6 on 11/29/2016. Cycle # 10 FOLFIRI/Avastin was on 12/13/2016. CEA 10.6 on 12/13/2016. Cycle # 11 FOLFIRI/Avastin was on 12/27/2016. CEA 11.1 on 12/27/2016. Cycle # 12 FOLFIRI/Avastin was on 01/10/2017. CEA 9.7 on 01/10/2017.       CT chest 01/23/2017 No new pulmonary nodule or lymphadenopathy. Patchy groundglass opacities within the left lower lobe, suggestive of infectious or inflammatory etiology. Followup CT chest in 3 months. Slight increased linear sclerosis along the left anterior sixth rib corresponding to an area of increased uptake on the prior bone scan from 10/21/2016, favored to be related to interval healing changes of a nondisplaced fracture. CT abdomen/pelvis on 01/23/2017 Redemonstration of multiple hepatic metastases, which are similar compared to the prior CT from 10/21/2016. Redemonstration of area of increased sclerosis along the right acetabulum suspicious for metastatic disease. Bone scan on 01/23/2017 Stable subtle uptake within the left proximal diaphysis, again suggestive of metastatic disease  Decreased subtle uptake within the medial right acetabulum.    Decreased uptake within the left anterior sixth rib corresponding to linear sclerosis on the recent CT, favored to be related to an joint rib fracture. Continue FOLFIRI + Avastin and repeat scans in 3 months. Cycle # 13 FOLFIRI + Avastin was on 01/24/2017. Cycle # 14 FOLFIRI + Avastin was on 02/07/2017. Cycle # 15 FOLFIRI + Avastin was on 02/21/2017. Cycle # 16 FOLFIRI + Avastin was on 03/07/2017. Cycle # 17 FOLFIRI + Avastin was on 03/21/2017. Cycle # 18 FOLFIRI + Avastin was on 04/04/2017. CT chest 04/17/2017 negative for metastatic disease. CT abdomen/pelvis 04/17/2017 Stable hypodense metastatic lesions within the liver. Stable area of increased sclerosis along the right acetabulum  Bone scan 04/17/2017 Stable subtle uptake within the left proximal femoral diaphysis; No definite abnormal uptake in the region of a sclerotic lesion along the posterior column of the right acetabulum noted on CT. Stable slight uptake within the left anterior sixth rib corresponding to linear sclerosis on the recent CT, favored to be related to a fracture. Continue FOLFIRI + Avastin and repeat scans in 3 months. Cycle # 19 FOLFIRI + Avastin was on 04/18/2017. Cycle # 20 FOLFIRI + Avastin was on 05/02/2017. Cycle # 21 FOLFIRI + Avastin was on 05/16/2017. Cycle # 22 FOLFIRI + Avastin was on 05/30/2017. Cycle # 23 FOLFIRI + Avastin was on 06/13/2017. Cycle # 24 FOLFIRI + Avastin was on 06/27/2017. Cycle # 25 FOLFIRI + Avastin was on 07/11/2017. Cycle # 26 FOLFIRI + Avastin was on 07/25/2017. Cycle # 27 FOLFIRI + Avastin was on 08/08/2017. Cycle # 28 FOLFIRI + Avastin was on 08/22/2017. Cycle # 29 FOLFIRI + Avastin was on 09/05/2017. Cycle # 30 FOLFIRI + Avastin was on 09/19/2017. Bone scan 09/26/2017 stable. CT abdomen/pelvis on 09/26/2017 Stable hypodense mass in the liver. Stable sclerotic lesion in the acetabulum. CT chest 09/26/2017 negative for metastatic disease. Cycle # 31 FOLFIRI + Avastin was on 10/03/2017. CEA 7.4 on 10/03/2017. Cycle # 32 FOLFIRI + Avastin was on 10/17/2017. CEA 6.0 on 10/17/2017.   Cycle # 33 FOLFIRI + Avastin was on 10/31/2017. CEA 6.8 on 10/31/2017. Cycle # 34 FOLFIRI + Avastin was on 11/14/2017. CEA 7.2 on 11/14/2017. Cycle # 35 FOLFIRI + Avastin was on 11/28/2017. CEA 5.1 on 11/28/2017. Cycle # 36 FOLFIRI + Avastin was on 12/12/2017. CEA 6.1 on 12/12/2017. Bone scan 12/22/2017 noted no evidence of metastatic disease to the axial or appendicular skeleton. CT chest 12/22/2017 noted no evidence of metastatic disease. CT abdomen/pelvis 12/22/2017 noted stable hypodense lesions in the liver. Continue FOLFIRI + Avastin and repeat scans in 3 months. Cycle # 37 FOLFIRI + Avastin was on 12/27/2018. Cycle # 38 FOLFIRI + Avastin was on 01/09/2018. Cycle # 39 FOLFIRI + Avastin was on 01/23/2018. Cycle # 40 FOLFIRI + Avastin was on 02/06/2018. Cycle # 41 FOLFIRI + Avastin was on 02/20/2018. Cycle # 42 FOLFIRI + Avastin was on 03/06/2018. Cycle # 43 FOLFIRI + Avastin was on 03/20/2018. Bone scan on 03/26/2018 negative for metastatic disease. CT chest 03/26/2018 noted ? new 7 mm nodule in LUCY. CT abdomen/pelvis 03/26/2018 noted stable hypodense lesions in the liver. ? New small ascites in the abdomen. Patient wants to continue to monitor the lung finding and repeat scans in 2 months instead of changing the current regimen into oral Lonsurf. Cycle # 44 FOLFIRI + Avastin was on 04/03/2018. CEA 6.3 on 04/03/2018. Cycle # 45 FOLFIRI + Avastin was on 04/24/2018. CEA 5.3 on 04/24/2018. Cycle # 46 FOLFIRI + Avastin was on 05/08/2018. CEA 5.4 on 05/08/2018. Cycle # 47 FOLFIRI + Avastin was on 05/22/2018. CEA 6.1 on 05/22/2018. CT chest 05/31/2018 noted stable nodule in LUCY. No evidence of worsening malignancy. CT abdomen/pelvis on 05/31/2018 noted stable liver metastasis. No evidence of worsening malignancy. Continue FOLFIRI + Avastin and repeat scans in 3 months. Cycle # 48 FOLFIRI + Avastin was on 06/06/2018. Cycle # 49 FOLFIRI + Avastin was on 06/19/2018.    Cycle # 50 FOLFIRI + Avastin was on 07/03/2018. Cycle # 51 FOLFIRI + Avastin was on 07/24/2018. Cycle # 52 FOLFIRI + Avastin was on 08/14/2018. Cycle # 53 FOLFIRI + Avastin was on 08/28/2018. CT chest 09/06/2018 noted interval decrease in size of LUCY measuring up to 4 mm, suggestive of treatment response. No mediastinal or hilar LN  CT abdomen/pelvis 09/06/2018 Slight interval increase in size of a previously noted metastatic lesion within the right hepatic lobe. This may be related to differences in phase of enhancement compared to the most recent study from 5/31/2018, the lesion remains decreased in size compared to the more previous studies. No abdominal, retroperitoneal, or pelvic lymphadenopathy. Continue FOLFIRI + Avastin and repeat scans in 2 months. Cycle # 54 FOLFIRI + Avastin was on 09/11/2018. Cycle # 55 FOLFIRI + Avastin was on 09/25/2018. Cycle # 56 FOLFIRI + Avastin was on 10/09/2018. Cycle # 57 FOLFIRI + Avastin was on 10/23/2018. CT chest 11/02/2018 stable 3 mm nodule within LUCY. No new right and left lung nodule. No mediastinal or osseous lesion. CT abdomen/pelvis 11/02/2018 stable metastatic disease to liver. No new metastatic disease identified. No mesenteric or retroperitoneal LN. No gross colonic lesion identified. Continue FOLFIRI + Avastin and repeat scans in 3 months. Cycle # 58 FOLFIRI + Avastin was on 11/06/2018. CEA 7.6 on 11/05/2018. Cycle # 59 FOLFIRI + Avastin was on 11/27/2018. CEA 6.9 on 11/26/2018. Cycle # 60 FOLFIRI + Avastin was on 12/11/2018. CEA 6.7 on 12/11/2018. Cycle # 61 FOLFIRI + Avastin was on 01/08/2019. CEA 5.6 on 01/07/2019. Cycle # 62 FOLFIRI + Avastin was on 01/29/2019. CEA 4.6 on 01/28/2019. Cycle # 63 FOLFIRI + Avastin was on 02/12/2019. CEA 4.3 on 02/11/2019. CT chest 02/21/2019 no evidence of metastatic disease. CT abdomen/pelvis 02/21/2019 stable hypodense liver lesions. No evidence of worsening metastatic disease.  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 Ordered MRI thoracic spine and referred to neurosurgery team.    Cycle # 64 FOLFIRI + Avastin was on 02/26/2019. CEA 4.7 on 02/25/2019. Cycle # 65 FOLFIRI + Avastin was on 03/12/2019. CEA 4.0 on 03/11/2019. Cycle # 66 FOLFIRI + Avastin was on 03/26/2019. CEA 4.7 on 03/25/2019. Cycle # 67 FOLFIRI + Avastin was on 04/09/2019. CEA 5.6 on 04/08/2019. Cycle # 68 FOLFIRI + Avastin was on 04/30/2019. CEA 4.9 on 04/29/2019. Cycle # 69 FOLFIRI + Avastin was on 05/14/2019. CEA 5.3 on 05/14/2019. CT chest 05/23/2019 stable small lung nodule with no evidence of metastatic disease. CT abdomen/pelvis 05/23/2019 Decrease conspicuity of the liver lesions. No new lesions seen. Stable splenomegaly. Continue FOLFIRI + Avastin and repeat scans in 3 months. Cycle # 70 FOLFIRI + Avastin was on 06/04/2019. CEA 4.8 on 06/03/2019. Cycle # 71 FOLFIRI + Avastin was on 06/18/2019. CEA 4.6 on 06/17/2019. Cycle # 72 FOLFIRI + Avastin was on 07/09/2019. CEA 4.3 on 07/08/2019. Cycle # 73 FOLFIRI + Avastin was on 07/30/2019. CEA 5.0 on 07/29/2019. Cycle # 74 FOLFIRI + Avastin was on 08/13/2019. CEA 5.0 on 08/12/2019. CT chest 08/20/2019 negative  CT abdomen/pelvis 08/20/2019 Previous identified hepatic lesions are not visualized on the current exam.  Continue FOLFIRI + Avastin and repeat scans in 3 months. Cycle # 75 FOLFIRI + Avastin was on 08/27/2019. CEA 5.0 on 08/26/2019. Cycle # 76 FOLFIRI + Avastin was on 09/17/2019. CEA 5.5 on 09/16/2019. Cycle # 77 FOLFIRI + Avastin was on 10/15/2019. CEA 4.6 on 10/15/2019. Cycle # 78 FOLFIRI + Avastin was on 10/29/2019. CEA 4.1 on 10/28/2019. Cycle # 79 FOLFIRI + Avastin was on 11/12/2019. CEA 4.3 on 11/12/2019. Cycle # 80 FOLFIRI + Avastin was on 12/10/2019. CEA 4.0 on 12/09/2019.   CT chest 12/19/2019 Stable 3 mm nodule within the left upper lobe, similar to the previous studies dating back to 11/2/2018, however decreased in size compared to the CT from 3/26/2018. No thoracic LN. CT abdomen/pelvis 12/19/2019 Previously described small hypodense lesions are more conspicuous on the current study compared to the recent study throughout the liver from 8/20/2019, however similar to the prior study from 2/21/2019. Findings may be related to differences in contrast opacification. No abdominal or pelvic lymphadenopathy. Continue FOLFIRI + Avastin and repeat scans in 2 months to f/u on liver lesions. Cycle # 81 FOLFIRI + Avastin was on 01/07/2020. CEA 3.8 on 01/06/2020. Cycle # 82 FOLFIRI + Avastin was on 01/21/2020. CEA 3.7 on 01/20/2020. Cycle # 83 FOLFIRI + Avastin was on 02/04/2020. CEA 4.1 on 02/03/2020. Cycle # 84 FOLFIRI + Avastin was on 02/18/2020. CEA 4.6 on 02/17/2020. EGD by Dr. Tyra Parisi 03/02/2020 showing no masses or lesions  Cycle # 85 FOLFIRI + Avastin was on 03/03/2020. CEA 7.4 on 03/02/2020. Cycle # 86 FOLFIRI + Avastin was on 03/17/2020. CEA 4.5 on 03/16/2020. Colonoscopy on 03/23/2020 by Dr. Tyra Parisi unremarkable (records requested). Cycle # 87 FOLFIRI + Avastin was on 03/31/2020. CEA 4.1 on 03/30/2020. Cycle # 88 FOLFIRI + Avastin was on 04/21/2020. CEA 6.4 on 04/20/2020. Cycle # 89 FOLFIRI + Avastin was on 05/05/2020. CEA 5.8 on 05/04/2020. Cycle # 90 FOLFIRI + Avastin was on 06/02/2020. CEA 5.5 on 06/01/2020. Cycle # 91 FOLFIRI + Avastin was on 06/16/2020. CEA 5.6 on 06/15/2020. CT chest 06/23/2020 noted no metastatic disease in the chest.   CT abdomen/pelvis 06/23/2020 Stable small liver lesions measuring up to 5 mm since 2019.   22 mm round mass in segment 8 of the liver with central high density stable from December 2019), previously measuring up to 34 mm in 2017. No additional metastatic disease in the abdomen or pelvis. Small amount of ascites. Overall stable disease. Continue FOLFIRI + Avastin and repeat scans in 2-3 months. Cycle # 92 FOLFIRI + Avastin was on 07/07/2020. CEA 5.7 on 07/06/2020. Cycle # 93 FOLFIRI + Avastin was on 07/21/2020. CEA 5.9 on 07/20/2020. Cycle # 94 FOLFIRI + Avastin was on 08/04/2020. CEA 5.5 on 08/04/2020. Cycle # 95 FOLFIRI + Avastin was on 08/25/2020. CEA 6.1 on 08/24/2020. CT chest 9/2/2020 stable unremarkable enhanced CT of the thorax. There is no metastatic disease to the lungs. CT abdomen pelvis on 9/2/2020 stable 2.2 cm low attenuated lesion within the central aspect of the right hepatic lobe favored to represent treated metastatic disease. No new hepatic lesion is identified. Stable splenomegaly  There is no appreciable colonic lesion or stricture  Pericholecystic fluid of uncertain etiology. General surgery team consulted. Laparoscopic cholecystectomy with normal cholangiogram 10/27/20  Gallbladder: Chronic cholecystitis and cholelithiasis.  Unremarkable regional lymph node. 11/13/2020; Seen by Dr. Claudia Mercado from General surgery team; cleared to re-start chemotherapy. Cycle # 96 FOLFIRI + Avastin was on 11/17/2020. CEA 3.6 on 11/16/2020. Cycle # 97 FOLFIRI + Avastin was on 12/01/2020. CEA 3.5 on 11/30/2020. Cycle # 98 FOLFIRI + Avastin was on 12/15/2020. CEA 4.3 on 12/15/2020. Cycle # 99 FOLFIRI + Avastin was on 01/05/2021. CEA 4.7 on 01/04/2021. CT chest 01/13/2021 negative for metastatic disease. CT abdomen/pelvis 01/13/2021 Unchanged central hepatic lesion. No specific signs of abdominopelvic metastatic disease. Continue FOLFIRI + Avastin and repeat scans in 3 months. Cycle # 100 FOLFIRI + Avastin was on 01/19/2021. CEA 4.7 on 01/18/2021. Cycle # 101 FOLFIRI + Avastin was on 02/02/2021. CEA 5.2 on 02/01/2021. Cycle # 102 FOLFIRI + Avastin was on 02/16/2021. CEA 5.6 on 02/15/2021. Cycle # 103 FOLFIRI + Avastin was on 03/02/2021. Cycle # 104 FOLFIRI (20% dose reduced due to significant toxicity) + Avastin was on 03/16/2021. Cycle # 105 FOLFIRI (same dose as cycle # 104) + Avastin was on 03/30/2021. CEA 6.5 on 03/29/2021.   Cycle # 106 FOLFIRI (same dose as cycle # 104) + Avastin was on 04/13/2021. CEA 6.0 on 04/12/2021  CT chest 04/20/2021 no evidence of metastatic disease. CT abdomen/pelvis 04/20/2021 No evidence of progressive metastatic disease. Stable 2 cm lesion in the right lobe of the liver, likely representing treated metastatic disease. Imaging reviewed. Continue FOLFIRI + Avastin and repeat scans in 3 months  Cycle # 107 FOLFIRI + Avastin was on 04/27/2021  Cycle # 108 FOLFIRI + Avastin was on 05/11/2021. Cycle # 109 FOLFIRI (held Avastin due to upcoming surgery) on 05/25/2021. CEA 6.3 on 5/24/21  Cycle # 110 FOLFIRI (held Avastin due to upcoming surgery) on 06/08/2021. CEA 6.3 on 6/07/21. Right inguinal/scrotol hernia repair on 07/13/2021 with Dr Monique Lopez with folely removal on Friday 07/23/2021. He developed fever on 07/24/2021 and was brought to [address] [place] [address],[address] ER via EMS; Sepsis secondary to urinary tract infection   Completed Abx  CEA 4.1 on 08/30/2021  Cycle # 111 FOLFIRI was on 08/31/2021. HBP team for evaluation of liver lesions; His lesion does appear to be a hemangioma as his prior metastatic deposits in his liver were hypo attenuating compared to this CT which is hyper attenuating. compared to his prior CT scans he has disappearing liver metastasis. MRI liver on 09/17/2021 No suspicious liver lesion identified. Colonoscopy GI Dr. Tayler Queen on 09/20/2021 unremarkable  CEA 3.3 on 09/27/2021. CEA 3.2 on 10/25/2021. CEA 2.7 on 11/22/2021. CEA 2.5 on 12/27/2021. CEA 2.4 on 01/24/2022. CEA 2.3 on 02/21/2022. CEA 2.4 on 03/21/2022. CEA 2.2 on 04/18/2022  CEA 2.2 on 05/16/2022    Today 05/17/2022 for f/u. No fever, chills. No nausea/vomiting. Fair appetite and energy level. + arthritis neuropathy      Review of Systems;  CONSTITUTIONAL: No fever, chills. Fair appetite and energy level  ENMT: Eyes: No diplopia; Nose: No epistaxis. Mouth:No lesions  RESPIRATORY: No hemoptysis, shortness of breath.    CARDIOVASCULAR: No chest pain, palpitations. GASTROINTESTINAL:No N/V, abdominal pain. GENITOURINARY: No dysuria, urinary frequency, hematuria. MSK: + Arthritis neuropathy  NEURO: No syncope, presyncope, headache. Remainder ROS: Negative. Past Medical History:   Past Medical History               Diagnosis Date    Arthritis      Cancer ([address]         colon    Depression      Hyperlipidemia      Hypertension           Medications:  Reviewed and reconciled.     Allergies: Allergies   Allergen Reactions    Neosporin [Neomycin-Polymyxin-Gramicidin] Rash    Tape Hernandez Guarneri Tape] Rash      Physical Exam:  /70   Pulse 68   Temp 98.8 °F (37.1 °C)   Ht 6' 1\" (1.854 m)   Wt 215 lb (97.5 kg)   SpO2 100%   BMI 28.37 kg/m²   GENERAL: Alert, oriented x 3, not in distress  HEENT: PERRLA, EOMI. NECK: Supple. Without lymphadenopathy. LUNGS: CTA bilaterally, with no wheezing, crackles or rhonchi. CARDIOVASCULAR: Regular rhythm. No murmurs, rubs or gallops. ABDOMEN: Soft. Non-tender, non-distended. EXTREMITIES: Without clubbing, cyanosis  NEUROLOGIC: No focal deficits. ECOG PS 1    Diagnostics:  Lab Results   Component Value Date    WBC 4.7 05/16/2022    HGB 10.1 (L) 05/16/2022    HCT 30.3 (L) 05/16/2022    MCV 92.4 05/16/2022     (L) 05/16/2022     Lab Results   Component Value Date     05/16/2022    K 4.1 05/16/2022     05/16/2022    CO2 26 05/16/2022    BUN 25 (H) 05/16/2022    CREATININE 1.5 (H) 05/16/2022    GLUCOSE 98 05/16/2022    CALCIUM 9.9 05/16/2022    PROT 7.4 05/16/2022    LABALBU 3.6 05/16/2022    BILITOT 0.5 05/16/2022    ALKPHOS 129 05/16/2022    AST 23 05/16/2022    ALT 11 05/16/2022    LABGLOM 46 05/16/2022    GFRAA 56 05/16/2022     Lab Results   Component Value Date    CEA 2.2 05/16/2022     Impression/Plan:  66 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 KRAS Mutation: Mutation detected. BRAF Mutation: Mutation not detected.   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 CT scan abdomen/pelvis on 10/26/2015 revealed small nodules at the lung bases, extensive liver lesions consistent with metastatic rectosigmoid cancer. CEA 3488 on 10/30/2015. Bone scan on 11/10/2015 noted no metastatic disease. CT chest on 11/10/2015 revealed Multiple pulmonary nodules in the upper and lower lobes consistent with metastatic disease; Hepatic metastasis also visualized. 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 Mediport was placed. Cycle # 1 of FOLFOX + Avastin was on 11/23/2015. CEA was 4555 on 11/23/2015. Cycle # 2 of FOLFOX + Avastin was on 12/08/2015. CEA was 3160 on 12/08/2015. Cycle # 3 of FOLFOX + Avastin was on 12/22/2015. CEA was 2516 on 12/21/2015. Cycle # 4 of FOLFOX + Avastin was on 01/05/2016. CEA was 2098 on 01/05/2015. Cycle # 5 of FOLFOX + Avastin was on 01/19/2016. CEA was 1511 on 01/05/2015.     -Bone scan on 01/26/2016 noted no metastatic disease. CT chest on 01/26/2016 revealed Significant response to treatment with no visible residual nodules. CT scan abdomen/pelvis on 01/26/2016 noted Interval decreased size of multiple masses in the liver compatible with treatment response. Continue another 2 months of FOLFOX + Avastin and repeat scans. Cycle # 6 of FOLFOX + Avastin was on 02/02/2016.  on 02/02/2016. Cycle # 7 of FOLFOX + Avastin was on 02/16/2016.  on 02/16/2016. Cycle # 8 of FOLFOX + Avastin was on 03/01/2016.  on 03/01/2016. Cycle # 9 of FOLFOX + Avastin was on 03/15/2016.  on 03/15/2016. Cycle # 10 of FOLFOX + Avastin was on 03/29/2016. .4 on 03/29/2016. Cycle # 11 of FOLFOX + Avastin was on 04/12/2016.  .4 on 04/12/2016.     Re-staging scans on 04/19/2016: CT Chest: clear lungs; no evidence of recurring pulmonary nodule; CT Abdomen/Pelvis: Further interval decrease in size of the multiple metastatic hepatic lesions, the largest lesion now measures 3.9 x 3.5 cm and previously measured 4.5 cm in maximum diameter. No mesenteric lymphadenopathy is identified; Bone Scan: No evidence of osseous metastasis. Continue same regimen and re-stage in 2-3 months. Cycle # 12 FOLFOX + Avastin was on 04/26/2016. .5 on 04/26/2016. Cycle # 13 FOLFOX + Avastin was on 05/10/2016. .3 on 05/10/2016. Cycle # 14 FOLFOX+AVASTIN was on 05/24/2016. .5 on 05/24/2016. Cycle # 15 FOLFOX + Avastin was on 06/07/2016. CEA 90.5 on 06/07/2016. Admitted to [facility] 06/13/2016-06/16/2016 for abdominal pain: EGD noted 1.5 cm clean based duodenal bulb ulceration s/p epinephrine and bicap per Dr. Yuriy Urrutia. No active bleeding. A. Stomach, biopsy: Mild chronic gastritis, immunostain negative for Helicobacter  B. Esophagus, biopsy: Gastric glandular mucosa with prominent ntestinal metaplasia ([place]'s epithelium), negative for epithelial dysplasia, esophageal squamous mucosa not identified     Cycle # 16 FOLFOX (discontinued avastin (bevacizumab) given association of peptic ulcer disease and known association of GI perforation.) was on 06/21/2016. CEA 47 on 06/21/2016. Cycle # 17 FOLFOX was on 07/05/2016. CEA 52.6 on 07/05/2016. CEA 47.6 on 07/19/2016.     Re-staging scans 07/19/2106: CT Chest negative for metastatic disease. CT Abdomen/Pelvis: Stable hepatic lesions; Question new mural thickening in the cecum. Bone Scan: New Let hip lesion suspicious for bone metastasis.     Increased CEA; new mural thickening in the cecum and new left hip lesion suspicious for bone metastasis are consistent with disease progression; He derived maximum benefit from FOLFOX/AVASTIN. D/C FOLFOX/AVASTIN. We recommended FOLFIRI second line therapy. Cycle # 1 FOLFIRI was on 08/02/2016. CEA 40.8 on 08/02/2016. Xgeva q4 weeks started on 08/02/2016. Cycle # 2 FOLFIRI was on 08/16/2016. CEA 31.7 on 08/16/2016.   Cycle # 3 FOLFIRI (added Avastin) was on 08/30/2016 given that ulcers healed on EGD 08/15/2016 by Dr. Ayse Nicholas; Protonix bid since avastin re-started. Colonoscopy on 08/29/2016 (to look at the cecal area) unremarkable. CEA 26.7 on 08/30/2016. Cycle # 4 FOLFIRI/Avastin was on 09/13/2016. CEA 23.4 on 09/13/2016. Cycle # 5 FOLFIRI/Avastin was on 09/27/2016. CEA 22.3 on 09/27/2016. Cycle # 6 FOLFIRI/Avastin was on 10/11/2016. CEA 18.4 on 10/11/2016.      Bone scan 10/21/2016 stable bone metastasis; ? New lesion anterior left sixth rib likely interval healing fracture. CT abdomen/pelvis revealed stable hepatic metastasis. CT chest negative for metastatic disease. Continue FOLFIRI/Avastin and repeat scans in 3 months. Cycle # 7 FOLFIRI/Avastin was on 10/25/2016. CEA 16.1 on 10/25/2016. Cycle # 8 FOLFIRI/Avastin was on 11/08/2016. CEA 15.7 on 11/08/2016. Cycle # 9 FOLFIRI/Avastin was on 11/29/2016. CEA 13.6 on 11/29/2016. Cycle # 10 FOLFIRI/Avastin was on 12/13/2016. CEA 10.6 on 12/13/2016. Cycle # 11 FOLFIRI/Avastin was on 12/27/2016. CEA 11.1 on 12/27/2016. Cycle # 12 FOLFIRI/Avastin was on 01/10/2017. CEA 9.7 on 01/10/2017. CT chest 01/23/2017 No new pulmonary nodule or lymphadenopathy. Patchy groundglass opacities within the left lower lobe, suggestive of infectious or inflammatory etiology. Followup CT chest in 3 months. Slight increased linear sclerosis along the left anterior sixth rib corresponding to an area of increased uptake on the prior bone scan from 10/21/2016, favored to be related to interval healing changes of a nondisplaced fracture. CT abdomen/pelvis on 01/23/2017 Redemonstration of multiple hepatic metastases, which are similar compared to the prior CT from 10/21/2016. Redemonstration of area of increased sclerosis along the right acetabulum suspicious for metastatic disease.    Bone scan on 01/23/2017 Stable subtle uptake within the left proximal diaphysis, again suggestive of metastatic disease  Decreased subtle uptake within the medial right acetabulum. Decreased uptake within the left anterior sixth rib corresponding to linear sclerosis on the recent CT, favored to be related to an joint rib fracture. Continue FOLFIRI + Avastin and repeat scans in 3 months. Cycle # 13 FOLFIRI + Avastin was on 01/24/2017. Cycle # 14 FOLFIRI + Avastin was on 02/07/2017. Cycle # 15 FOLFIRI + Avastin was on 02/21/2017. Cycle # 16 FOLFIRI + Avastin was on 03/07/2017. Cycle # 17 FOLFIRI + Avastin was on 03/21/2017. Cycle # 18 FOLFIRI + Avastin was on 04/04/2017. CT chest 04/17/2017 negative for metastatic disease. CT abdomen/pelvis 04/17/2017 Stable hypodense metastatic lesions within the liver. Stable area of increased sclerosis along the right acetabulum  Bone scan 04/17/2017 Stable subtle uptake within the left proximal femoral diaphysis; No definite abnormal uptake in the region of a sclerotic lesion along the posterior column of the right acetabulum noted on CT. Stable slight uptake within the left anterior sixth rib corresponding to linear sclerosis on the recent CT, favored to be related to a fracture. Continue FOLFIRI + Avastin and repeat scans in 3 months. Cycle # 19 FOLFIRI + Avastin was on 04/18/2017. CEA 7.5 on 04/18/2017. Cycle # 20 FOLFIRI + Avastin was on 05/02/2017. CEA 7.7 on 05/02/2017. Cycle # 21 FOLFIRI + Avastin was on 05/16/2017. CEA 7.1 on 05/16/2017. Cycle # 22 FOLFIRI + Avastin was on 05/30/2017. CEA 8.2 on 05/30/2017. Cycle # 23 FOLFIRI + Avastin was on 06/13/2017. CEA 7.7 on 06/13/2017. Cycle # 24 FOLFIRI + Avastin was on 06/27/2017. CEA 6.6 on 06/27/2017. Re-staging scans 07/05/2017:  Bone scan stable proximal left femoral diaphysis, right acetabulum, and left anterior sixth rib lesions;  CT abdomen/pelvis stable hypodense metastatic lesions within the liver; CT chest without convincing evidence of metastatic disease. Cycle # 25 FOLFIRI + Avastin was on 07/11/2017. CEA 6.3 on 07/11/2017.   Cycle # 26 FOLFIRI + Avastin was on 07/25/2017. CEA 7.3 on 07/25/2017. Cycle # 27 FOLFIRI + Avastin was on 08/08/2017. CEA 6.5 on 08/08/2017. Cycle # 28 FOLFIRI + Avastin was on 08/22/2017. CEA 5.9 on 08/22/2017. Cycle # 29 FOLFIRI + Avastin was on 09/05/2017. CEA 6.3 on 09/05/2017. Cycle # 30 FOLFIRI + Avastin was on 09/19/2017. CEA 6.3 on 09/19/2017. Bone scan 09/26/2017 stable. CT abdomen/pelvis on 09/26/2017 Stable hypodense mass in the liver. Stable sclerotic lesion in the acetabulum. CT chest 09/26/2017 negative for metastatic disease. Cycle # 31 FOLFIRI + Avastin was on 10/03/2017. CEA 7.4 on 10/03/2017. Cycle # 32 FOLFIRI + Avastin was on 10/17/2017. CEA 6.0 on 10/17/2017. Cycle # 33 FOLFIRI + Avastin was on 10/31/2017. CEA 6.8 on 10/31/2017. Cycle # 34 FOLFIRI + Avastin was on 11/14/2017. CEA 7.2 on 11/14/2017. Cycle # 35 FOLFIRI + Avastin was on 11/28/2017. CEA 5.1 on 11/28/2017. Cycle # 36 FOLFIRI + Avastin was on 12/12/2017. CEA 6.1 on 12/12/2017. Bone scan 12/22/2017 noted no evidence of metastatic disease to the axial or appendicular skeleton. CT chest 12/22/2017 noted no evidence of metastatic disease. CT abdomen/pelvis 12/22/2017 noted stable hypodense lesions in the liver. Continue FOLFIRI + Avastin and repeat scans in 3 months. Cycle # 37 FOLFIRI + Avastin was on 12/27/2018. CEA 6.7 on 12/27/2017. Cycle # 38 FOLFIRI + Avastin was on 01/09/2018. CEA 5.0 on 01/09/2018. Cycle # 39 FOLFIRI + Avastin was on 01/23/2018. CEA 6.5 on 01/23/2018. Cycle # 40 FOLFIRI + Avastin was on 02/06/2018. CEA 6.9 on 02/06/2018. Cycle # 41 FOLFIRI + Avastin was on 02/20/2018. CEA 6.4 on 02/20/2018. Cycle # 42 FOLFIRI + Avastin was on 03/06/2018. CEA 6.6 on 03/06/2018. Cycle # 43 FOLFIRI + Avastin was on 03/20/2018. CEA 5.7 on 03/20/2018. Bone scan on 03/26/2018 negative for metastatic disease. CT chest 03/26/2018 noted ? new 7 mm nodule in LUCY.    CT abdomen/pelvis 03/26/2018 noted stable hypodense lesions in the liver. ? New small ascites in the abdomen. Patient wants to continue to monitor the lung finding and repeat scans in 2 months instead of changing the current chemotherapy regimen to oral Lonsurf. Cycle # 44 FOLFIRI + Avastin was on 04/03/2018. CEA 6.3 on 04/03/2018. Cycle # 45 FOLFIRI + Avastin was on 04/24/2018. CEA 5.3 on 04/24/2018. Cycle # 46 FOLFIRI + Avastin was on 05/08/2018. CEA 5.4 on 05/08/2018. Cycle # 47 FOLFIRI + Avastin was on 05/22/2018. CEA 6.1 on 05/22/2018. CT chest 05/31/2018 noted stable nodule in LUCY. No evidence of worsening malignancy. CT abdomen/pelvis on 05/31/2018 noted stable liver metastasis. No evidence of worsening malignancy. Continue FOLFIRI + Avastin and repeat scans in 3 months. Cycle # 48 FOLFIRI + Avastin was on 06/06/2018. CEA 6.3 on 06/05/2018. Cycle # 49 FOLFIRI + Avastin was on 06/19/2018. CEA 6.1 on 06/19/2018. Cycle # 50 FOLFIRI + Avastin was on 07/03/2018. CEA 7.4 on 07/03/2018. Cycle # 51 FOLFIRI + Avastin was on 07/24/2018. CEA 6.7 on 07/24/2018. Cycle # 52 FOLFIRI + Avastin was on 08/14/2018. CEA 6.8 on 08/14/2018. Cycle # 53 FOLFIRI + Avastin was on 08/28/2018. CEA 6.5 on 08/27/2018. CT chest 09/06/2018 noted interval decrease in size of LUCY measuring up to 4 mm, suggestive of treatment response. No mediastinal or hilar LN  CT abdomen/pelvis 09/06/2018 Slight interval increase in size of a previously noted metastatic lesion within the right hepatic lobe. This may be related to differences in phase of enhancement compared to the most recent study from 5/31/2018, the lesion remains decreased in size compared to the more previous studies. No abdominal, retroperitoneal, or pelvic lymphadenopathy. Continue FOLFIRI + Avastin and repeat scans in 2 months. Cycle # 54 FOLFIRI + Avastin was on 09/11/2018. CEA 6.4 on 09/10/2018. Cycle # 55 FOLFIRI + Avastin was on 09/25/2018. CEA 6.4 on 09/25/2018.    Cycle # 56 FOLFIRI + Avastin was on 10/09/2018. CEA 6.7 on 10/08/2018. Cycle # 57 FOLFIRI + Avastin was on 10/23/2018. CEA 7.2 on 10/22/2018. CT chest 11/02/2018 stable 3 mm nodule within LUCY. No new right and left lung nodule. No mediastinal or osseous lesion. CT abdomen/pelvis 11/02/2018 stable metastatic disease to liver. No new metastatic disease identified. No mesenteric or retroperitoneal LN. No gross colonic lesion identified. Continue FOLFIRI + Avastin and repeat scans in 3 months. Cycle # 58 FOLFIRI + Avastin was on 11/06/2018. CEA 7.6 on 11/05/2018. Cycle # 59 FOLFIRI + Avastin was on 11/27/2018. CEA 6.9 on 11/26/2018. Cycle # 60 FOLFIRI + Avastin was on 12/11/2018. CEA 6.7 on 12/11/2018. Cycle # 61 FOLFIRI + Avastin was on 01/08/2019. CEA 5.6 on 01/07/2019. Cycle # 62 FOLFIRI + Avastin was on 01/29/2019. CEA 4.6 on 01/28/2019. Cycle # 63 FOLFIRI + Avastin was on 02/12/2019. CEA 4.3 on 02/11/2019. CT chest 02/21/2019 no evidence of metastatic disease. CT abdomen/pelvis 02/21/2019 stable hypodense liver lesions. No evidence of worsening metastatic disease. 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 Ordered MRI thoracic spine and referred to neurosurgery team.  Cycle # 64 FOLFIRI + Avastin was on 02/26/2019. CEA 4.7 on 02/25/2019. Cycle # 65 FOLFIRI + Avastin was on 03/12/2019. CEA 4.0 on 03/11/2019. Cycle # 66 FOLFIRI + Avastin was on 03/26/2019. CEA 4.7 on 03/25/2019. Cycle # 67 FOLFIRI + Avastin was on 04/09/2019. CEA 5.6 on 04/08/2019. Cycle # 68 FOLFIRI + Avastin was on 04/30/2019. CEA 4.9 on 04/29/2019. Cycle # 69 FOLFIRI + Avastin was on 05/14/2019. CEA 5.3 on 05/14/2019. CT chest 05/23/2019 stable small lung nodule with no evidence of metastatic disease. CT abdomen/pelvis 05/23/2019 Decrease conspicuity of the liver lesions. No new lesions seen. Stable splenomegaly. Continue FOLFIRI + Avastin and repeat scans in 3 months.   Cycle # 70 FOLFIRI + Avastin was on 06/04/2019. CEA 4.8 on 06/03/2019. Cycle # 71 FOLFIRI + Avastin was on 06/18/2019. CEA 4.6 on 06/17/2019. Cycle # 72 FOLFIRI + Avastin was on 07/09/2019. CEA 4.3 on 07/08/2019. Cycle # 73 FOLFIRI + Avastin was on 07/30/2019. CEA 5.0 on 07/29/2019. Cycle # 74 FOLFIRI + Avastin was on 08/13/2019. CEA 5.0 on 08/12/2019. CT chest 08/20/2019 negative  CT abdomen/pelvis 08/20/2019 Previous identified hepatic lesions are not visualized on the current exam.  Continue FOLFIRI + Avastin and repeat scans in 3 months. Cycle # 75 FOLFIRI + Avastin was on 08/27/2019. CEA 5.0 on 08/26/2019. Cycle # 76 FOLFIRI + Avastin was on 09/17/2019. CEA 5.5 on 09/16/2019. Cycle # 77 FOLFIRI + Avastin was on 10/15/2019. CEA 4.6 on 10/15/2019. Cycle # 78 FOLFIRI + Avastin was on 10/29/2019. CEA 4.1 on 10/28/2019. Cycle # 79 FOLFIRI + Avastin was on 11/12/2019. CEA 4.3 on 11/12/2019. Cycle # 80 FOLFIRI + Avastin was on 12/10/2019. CEA 4.0 on 12/09/2019. CT chest 12/19/2019 Stable 3 mm nodule within the left upper lobe, similar to the previous studies dating back to 11/2/2018, however decreased in size compared to the CT from 3/26/2018. No thoracic LN. CT abdomen/pelvis 12/19/2019 Previously described small hypodense lesions are more conspicuous on the current study compared to the recent study throughout the liver from 8/20/2019, however similar to the prior study from 2/21/2019. Findings may be related to differences in contrast opacification. No abdominal or pelvic lymphadenopathy. Continue FOLFIRI + Avastin and repeat scans in 2 months to f/u on liver lesions. Cycle # 81 FOLFIRI + Avastin was on 01/07/2020. CEA 3.8 on 01/06/2020. Cycle # 82 FOLFIRI + Avastin was on 01/21/2020. CEA 3.7 on 01/20/2020. Cycle # 83 FOLFIRI + Avastin was on 02/04/2020. CEA 4.1 on 02/03/2020. Cycle # 84 FOLFIRI + Avastin was on 02/18/2020. CEA 4.6 on 02/17/2020.   CT chest 2/28/2020 no evidence of metastatic disease to the lungs and no mediastinal or hilar adenopathy. CT abdomen pelvis 2/28/2020 no enhancing lesions seen within the liver to suggest metastatic disease. Wall thickening of the rectosigmoid junction. Images reviewed. Continue FOLFIRI Avastin and repeat scans in 3 months  EGD by Dr. Roopa Brown 03/02/2020 showing no masses or lesions. Cycle # 85 FOLFIRI + Avastin was on 03/03/2020. CEA 7.4 on 03/02/2020. Cycle # 86 FOLFIRI + Avastin was on 03/17/2020. CEA 4.5 on 03/16/2020. Colonoscopy on 03/23/2020 by Dr. Roopa aguero (records requested). Cycle # 87 FOLFIRI + Avastin was on 03/31/2020. CEA 4.1 on 03/30/2020. Cycle # 88 FOLFIRI + Avastin was on 04/21/2020. CEA 6.4 on 04/20/2020. Cycle # 89 FOLFIRI + Avastin was on 05/05/2020. CEA 5.8 on 05/04/2020. Cycle # 90 FOLFIRI + Avastin was on 06/02/2020. CEA 5.5 on 06/01/2020. Cycle # 91 FOLFIRI + Avastin was on 06/16/2020. CEA 5.6 on 06/15/2020. CT chest 06/23/2020 noted no metastatic disease in the chest.   CT abdomen/pelvis 06/23/2020 Stable small liver lesions measuring up to 5 mm since 2019.   22 mm round mass in segment 8 of the liver with central high density stable from December 2019), previously measuring up to 34 mm in 2017. No additional metastatic disease in the abdomen or pelvis. Small amount of ascites. Overall stable disease. Continue FOLFIRI + Avastin and repeat scans in 2-3 months. Cycle # 92 FOLFIRI + Avastin was on 07/07/2020. CEA 5.7 on 07/06/2020. Cycle # 93 FOLFIRI + Avastin was on 07/21/2020. CEA 5.9 on 07/20/2020. Cycle # 94 FOLFIRI + Avastin was on 08/04/2020. CEA 5.5 on 08/04/2020. Cycle # 95 FOLFIRI + Avastin was on 08/25/2020. CEA 6.1 on 08/24/2020. CT chest 9/2/2020 stable unremarkable enhanced CT of the thorax. There is no metastatic disease to the lungs. CT abdomen pelvis on 9/2/2020 stable 2.2 cm low attenuated lesion within the central aspect of the right hepatic lobe favored to represent treated metastatic disease.   No new hepatic lesion is identified. Stable splenomegaly  There is no appreciable colonic lesion or stricture. Pericholecystic fluid of uncertain etiology. Laparoscopic cholecystectomy with normal cholangiogram 10/27/20  Gallbladder: Chronic cholecystitis and cholelithiasis.  Unremarkable regional lymph node. 11/13/2020; Seen by Dr. Lester Justice from General surgery team; cleared to re-start chemotherapy. Cycle # 96 FOLFIRI + Avastin was on 11/17/2020. CEA 3.6 on 11/16/2020. Cycle # 97 FOLFIRI + Avastin was on 12/01/2020. CEA 3.5 on 11/30/2020. Cycle # 98 FOLFIRI + Avastin was on 12/15/2020. CEA 4.3 on 12/15/2020. Cycle # 99 FOLFIRI + Avastin was on 01/05/2021. CEA 4.7 on 01/04/2021. CT chest 01/13/2021 negative for metastatic disease. CT abdomen/pelvis 01/13/2021 Unchanged central hepatic lesion. No specific signs of abdominopelvic metastatic disease. Continue FOLFIRI + Avastin and repeat scans in 3 months. Cycle # 100 FOLFIRI + Avastin was on 01/19/2021. CEA 4.7 on 01/18/2021. Cycle # 101 FOLFIRI + Avastin was on 02/02/2021. CEA 5.2 on 02/01/2021. Cycle # 102 FOLFIRI + Avastin was on 02/16/2021. CEA 5.6 on 02/15/2021. Cycle # 103 FOLFIRI + Avastin was on 03/02/2021. Cycle # 104 FOLFIRI (20% dose reduced due to significant toxicity) + Avastin was on 03/16/2021. Cycle # 105 FOLFIRI (same dose as cycle # 104) + Avastin was on 03/30/2021. CEA 6.5 on 03/29/2021. Cycle # 106 FOLFIRI (same dose as cycle # 104) + Avastin was on 04/13/2021. CEA 6.0 on 04/12/2021  CT chest 04/20/2021 no evidence of metastatic disease. CT abdomen/pelvis 04/20/2021 No evidence of progressive metastatic disease. Stable 2 cm lesion in the right lobe of the liver, likely representing treated metastatic disease. Imaging reviewed. Continue FOLFIRI + Avastin and repeat scans in 3 months  Cycle # 107 FOLFIRI + Avastin was on 04/27/2021  Cycle # 108 FOLFIRI + Avastin was on 05/11/2021. CEA 6.4 on 05/10/2021.    Cycle # 109 FOLFIRI (held Avastin due to upcoming surgery) on 05/25/2021. CEA 6.3 on 5/24/21  Cycle # 110 FOLFIRI (held Avastin due to upcoming surgery) on 06/08/2021. CEA 6.3 on 6/07/21. CEA 5.8 on 06/28/2021. Right inguinal/scrotol hernia repair on 07/13/2021 with Dr Lester Justice with folely removal on 07/23/2021. He developed fever on 07/24/2021 and was brought to [address] [place], [place] [address] ER via EMS; Sepsis secondary to urinary tract infection; Completed Abx  CT chest 08/13/2021 no sign of recurrent or metastatic disease. CT abdomen/pelvis 08/13/2021 unchanged hepatic lesions. Splenomegaly and secondary signs of portal venous hypertension  CEA 4.0 on 08/16/2021  CEA 4.1 on 08/30/2021  Cycle # 111 FOLFIRI was on 08/31/2021. HBP team for evaluation of liver lesions; His lesion does appear to be a hemangioma as his prior metastatic deposits in his liver were hypo attenuating compared to this CT which is hyper attenuating. compared to his prior CT scans he has disappearing liver metastasis. MRI liver on 09/17/2021 No suspicious liver lesion identified. Colonoscopy GI Dr. Venus Medrano on 09/20/2021 unremarkable  CEA 3.3 on 09/27/2021. CEA 3.2 on 10/25/2021. CT chest 11/08/2021: Similar chronic appearing findings.  No acute process or evidence of metastatic disease identified. CT Abdomen/pelvis 11/08/2021: No significant change in the appearance of the liver. Similar appearance of splenomegaly. No interval change. CEA 2.7 on 11/22/2021. CEA 2.5 on 12/27/2021  CEA 2.4 on 01/24/2022  CT chest/abdomen/pelvis 02/04/2022 Stable CT of the chest abdomen/ pelvis with the a 1.4 cm enhancing nodule in the right hepatic lobe which is marginally improved. Imaging reviewed. CEA 2.3 on 02/21/2022  CEA 2.4 on 03/21/2022  CEA 2.2 on 04/18/2022  CT chest abdomen pelvis 5/11/2022: progressive hepatic metastasis with increasing number and prominence of the hepatic lesions  Soft tissue density in the ileocecal valve probably fecal matter.    CEA 2.2 on 05/16/2022  Imaging reviewed. Labs reviewed. PET/CT scan ordered  HBP team consulted for further evaluation    RTC 2 weeks to review test results.  He is seeing Dr. Asad Sol on 06/20/2022  MSI testing noted no mismatch repair protein loss of expression    5/17/2022  Cherylene Guarneri, MD  Board Certified Medical Oncologist

## 2022-06-06 ENCOUNTER — HOSPITAL ENCOUNTER (OUTPATIENT)
Age: 72
Discharge: HOME | End: 2022-06-06
Payer: MEDICARE

## 2022-06-06 ENCOUNTER — HOSPITAL ENCOUNTER (OUTPATIENT)
Dept: NUCLEAR MEDICINE | Age: 72
Discharge: HOME | End: 2022-06-06
Payer: MEDICARE

## 2022-06-06 DIAGNOSIS — C78.7 RECTAL CANCER METASTASIZED TO LIVER (HCC): ICD-10-CM

## 2022-06-06 DIAGNOSIS — C20 RECTAL CANCER METASTASIZED TO LIVER (HCC): ICD-10-CM

## 2022-06-06 LAB
ALBUMIN SERPL-MCNC: 4.3 G/DL (ref 3.5–5.2)
ALP BLD-CCNC: 143 U/L (ref 40–129)
ALT SERPL-CCNC: 15 U/L (ref 0–40)
ANION GAP SERPL CALCULATED.3IONS-SCNC: 12 MMOL/L (ref 7–16)
AST SERPL-CCNC: 26 U/L (ref 0–39)
BASOPHILS ABSOLUTE: 0.03 E9/L (ref 0–0.2)
BASOPHILS RELATIVE PERCENT: 0.8 % (ref 0–2)
BILIRUB SERPL-MCNC: 0.9 MG/DL (ref 0–1.2)
BUN BLDV-MCNC: 28 MG/DL (ref 6–23)
CALCIUM SERPL-MCNC: 10.4 MG/DL (ref 8.6–10.2)
CEA: 2.3 NG/ML (ref 0–5.2)
CHLORIDE BLD-SCNC: 103 MMOL/L (ref 98–107)
CO2: 25 MMOL/L (ref 22–29)
CREAT SERPL-MCNC: 1.4 MG/DL (ref 0.7–1.2)
EOSINOPHILS ABSOLUTE: 0.08 E9/L (ref 0.05–0.5)
EOSINOPHILS RELATIVE PERCENT: 2.1 % (ref 0–6)
GFR AFRICAN AMERICAN: >60
GFR NON-AFRICAN AMERICAN: 50 ML/MIN/1.73
GLUCOSE BLD-MCNC: 98 MG/DL (ref 74–99)
HCT VFR BLD CALC: 33.4 % (ref 37–54)
HEMOGLOBIN: 10.8 G/DL (ref 12.5–16.5)
IMMATURE GRANULOCYTES #: 0.02 E9/L
IMMATURE GRANULOCYTES %: 0.5 % (ref 0–5)
LYMPHOCYTES ABSOLUTE: 0.56 E9/L (ref 1.5–4)
LYMPHOCYTES RELATIVE PERCENT: 14.6 % (ref 20–42)
MCH RBC QN AUTO: 30.3 PG (ref 26–35)
MCHC RBC AUTO-ENTMCNC: 32.3 % (ref 32–34.5)
MCV RBC AUTO: 93.6 FL (ref 80–99.9)
MONOCYTES ABSOLUTE: 0.3 E9/L (ref 0.1–0.95)
MONOCYTES RELATIVE PERCENT: 7.8 % (ref 2–12)
NEUTROPHILS ABSOLUTE: 2.85 E9/L (ref 1.8–7.3)
NEUTROPHILS RELATIVE PERCENT: 74.2 % (ref 43–80)
PDW BLD-RTO: 13.4 FL (ref 11.5–15)
PLATELET # BLD: 137 E9/L (ref 130–450)
PMV BLD AUTO: 9.9 FL (ref 7–12)
POTASSIUM SERPL-SCNC: 4.3 MMOL/L (ref 3.5–5)
RBC # BLD: 3.57 E12/L (ref 3.8–5.8)
SODIUM BLD-SCNC: 140 MMOL/L (ref 132–146)
TOTAL PROTEIN: 8.2 G/DL (ref 6.4–8.3)
WBC # BLD: 3.8 E9/L (ref 4.5–11.5)

## 2022-06-06 PROCEDURE — 82378 CARCINOEMBRYONIC ANTIGEN: CPT

## 2022-06-06 PROCEDURE — A9552 F18 FDG: HCPCS | Performed by: RADIOLOGY

## 2022-06-06 PROCEDURE — 80053 COMPREHEN METABOLIC PANEL: CPT

## 2022-06-06 PROCEDURE — 3430000000 HC RX DIAGNOSTIC RADIOPHARMACEUTICAL: Performed by: RADIOLOGY

## 2022-06-06 PROCEDURE — 78815 PET IMAGE W/CT SKULL-THIGH: CPT

## 2022-06-06 PROCEDURE — 85025 COMPLETE CBC W/AUTO DIFF WBC: CPT

## 2022-06-06 PROCEDURE — 36415 COLL VENOUS BLD VENIPUNCTURE: CPT

## 2022-06-06 RX ORDER — FLUDEOXYGLUCOSE F 18 200 MCI/ML
15 INJECTION, SOLUTION INTRAVENOUS
Status: COMPLETED | OUTPATIENT
Start: 2022-06-06 | End: 2022-06-06

## 2022-06-06 RX ADMIN — FLUDEOXYGLUCOSE F 18 15 MILLICURIE: 200 INJECTION, SOLUTION INTRAVENOUS at 10:39

## 2022-06-07 ENCOUNTER — OFFICE VISIT (OUTPATIENT)
Dept: ONCOLOGY | Age: 72
End: 2022-06-07
Payer: MEDICARE

## 2022-06-07 VITALS
TEMPERATURE: 98 F | BODY MASS INDEX: 28.1 KG/M2 | HEIGHT: 73 IN | HEART RATE: 62 BPM | SYSTOLIC BLOOD PRESSURE: 140 MMHG | OXYGEN SATURATION: 99 % | WEIGHT: 212 LBS | DIASTOLIC BLOOD PRESSURE: 70 MMHG

## 2022-06-07 DIAGNOSIS — C20 RECTAL CANCER (HCC): Primary | ICD-10-CM

## 2022-06-07 PROCEDURE — 1123F ACP DISCUSS/DSCN MKR DOCD: CPT | Performed by: INTERNAL MEDICINE

## 2022-06-07 PROCEDURE — 99215 OFFICE O/P EST HI 40 MIN: CPT | Performed by: INTERNAL MEDICINE

## 2022-06-07 PROCEDURE — 99212 OFFICE O/P EST SF 10 MIN: CPT

## 2022-06-07 NOTE — PROGRESS NOTES
[place] [id number]           Attending Clinic Note     Reason for Visit: Follow-up on a patient with Metastatic Rectal Cancer.     PCP: Candice Staley MD     History of Present Illness:  68 y/o  male who was referred to see Dr. Kiet Cristobal (GI team) for evaluation of bright red blood per rectum, mild anemia and change in bowel habits with diarrhea. CEA 2640 on 10/19/2015. AlcP 299 AST 57 ALT 75 on 10/19/2015. Colonoscopy in 2013 noted no significant polyps, colitis or lesions at that time. Denies any Family History of colorectal cancer or polyps.     Colonoscopy on 10/19/2015 revealed:  1. Ascending polyp, 8 mm, hot snare: Tubulovillous adenoma. 2. Transverse polyp, 1 cm, hot snare: Serrated polyp most consistent with sessile serrated adenoma. 3. Five splenic flexure polyps, three - 5 mm, 7 mm, 8 mm, hot snare and biopsy: Four segments of Tubular Adenoma  4. Descending polyp, 5 mm, biopsy: Tubular adenoma. 5. Sigmoid polyp, 4 mm biopsy, Serrated polyp most consistent with sessile serrated adenoma. 6. Two rectal polyps, 4 mm biopsy: Serrated polyp most consistent with hyperplastic polyp. 7. Rectosigmoid colon mass (large mass approximately 65% circumference of the lumen; Very friable, firm and hard): Tubulovillous adenoma with associated focal erosion and fibroplasia.      CT scan abdomen/pelvis on 10/26/2015:  1. Small nodules at lung bases likely represent metastatic colon   cancer. 2. Extensive likely metastatic colon cancer throughout the liver.      3. Mild mural thickening and luminal narrowing in the   terminal ileum, otherwise nonspecific.      Colonoscopy with snare removal rectal mass was performed by Dr. Gayathri Colon. Pathology proved:  Rectal polyp: Invasive adenocarcinoma involving villous adenoma and extending to the cauterized edge of excision. KRAS Mutation: Mutation detected. BRAF Mutation: Mutation not detected.   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     CEA 3488. Bone scan on 11/10/2015 noted no metastatic disease. CT chest on 11/10/2015 revealed Multiple pulmonary nodules in the upper and lower lobes consistent with metastatic disease;   Hepatic metastasis also visualized. 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 Mediport was placed. Cycle # 1 of FOLFOX + Avastin was on 11/23/2015. CEA was 4555 on 11/23/2015. Cycle # 2 of FOLFOX + Avastin was on 12/08/2015. CEA was 3160 on 12/08/2015. Cycle # 3 of FOLFOX + Avastin was on 12/22/2015. CEA was 2516 on 12/21/2015. Cycle # 4 of FOLFOX + Avastin was on 01/05/2016. CEA was 2098 on 01/05/2015. Cycle # 5 of FOLFOX + Avastin was on 01/19/2016. CEA was 1511 on 01/05/2015.     -Bone scan on 01/26/2016 noted no metastatic disease. CT chest on 01/26/2016 revealed Significant response to treatment with no visible residual nodules. CT scan abdomen/pelvis on 01/26/2016 noted Interval decreased size of multiple masses in the liver compatible with treatment response. Continue another 2 months of FOLFOX + Avastin and repeat scans. Cycle # 6 of FOLFOX + Avastin was on 02/02/2016.  on 02/02/2016. Cycle # 7 of FOLFOX + Avastin was on 02/16/2016.  on 02/16/2016. Cycle # 8 of FOLFOX + Avastin was on 03/01/2016.  on 03/01/2016. Cycle # 9 of FOLFOX + Avastin was on 03/15/2016.  on 03/15/2016. Cycle # 10 of FOLFOX + Avastin was on 03/29/2016. .4 on 03/29/2016. Cycle # 11 of FOLFOX + Avastin was on 04/12/2016. .4 on 04/12/2016.     Re-staging scans on 04/19/2016: CT Chest: clear lungs; no evidence of recurring pulmonary nodule; CT Abdomen/Pelvis: Further interval decrease in size of the multiple metastatic hepatic lesions, the largest lesion now measures 3.9 x 3.5 cm and previously measured 4.5 cm in maximum diameter.  No mesenteric lymphadenopathy is identified; Bone Scan: No evidence of osseous metastasis. Continue same regimen and re-stage in 2-3 months. Cycle # 12 FOLFOX + Avastin was on 04/26/2016. .5 on 04/26/2016. Cycle # 13 FOLFOX + Avastin was on 05/10/2016. .3 on 05/10/2016. Cycle # 14 FOLFOX+AVASTIN was on 05/24/2016. .5 on 05/24/2016. Cycle # 15 FOLFOX + Avastin was on 06/07/2016. CEA 90.5 on 06/07/2016. Admitted to [facility] 06/13/2016-06/16/2016 for abdominal pain: EGD noted 1.5 cm clean based duodenal bulb ulceration s/p epinephrine and bicap per Dr. Eliza Goodman. No active bleeding. A. Stomach, biopsy: Mild chronic gastritis, immunostain negative for Helicobacter  B. Esophagus, biopsy: Gastric glandular mucosa with prominent intestinal metaplasia (Madird's epithelium), negative for epithelial dysplasia, esophageal squamous mucosa not identified  Cycle # 16 FOLFOX (discontinued avastin (bevacizumab) given association of peptic ulcer disease and known association of GI perforation) was on 06/21/2016. Cycle # 17 FOLFOX was on 07/05/2016. CEA 52.6 on 07/19/2016. Cycle # 17 FOLFOX was on 07/05/2016. CEA 52.6 on 07/05/2016. CEA 47.6 on 07/19/2016.     Re-staging scans 07/19/2106: CT Chest negative for metastatic disease. CT Abdomen/Pelvis: Stable hepatic lesions; Question new mural thickening in the cecum. Bone Scan: New Let hip lesion suspicious for bone metastasis.     Increased CEA; new mural thickening in the cecum and new left hip lesion suspicious for bone metastasis are consistent with disease progression; He derived maximum benefit from FOLFOX/AVASTIN. D/C FOLFOX/AVASTIN. We recommended FOLFIRI second line therapy. Cycle # 1 FOLFIRI was on 08/02/2016. CEA 40.8 on 08/02/2016. Xgeva q4 weeks started on 08/02/2016. Cycle # 2 FOLFIRI was on 08/16/2016. CEA 31.7 on 08/16/2016. Cycle # 3 FOLFIRI (added Avastin) was on 08/30/2016 given that ulcers healed on EGD 08/15/2016 by Dr. Sonido Hedrick; Protonix bid since avastin re-started.    Colonoscopy on 08/29/2016 (to look at the cecal area) unremarkable. CEA 26.7 on 08/30/2016. Cycle # 4 FOLFIRI/Avastin was on 09/13/2016. CEA 23.4 on 09/13/2016. Cycle # 5 FOLFIRI/Avastin was on 09/27/2016. CEA 22.3 on 09/27/2016. Cycle # 6 FOLFIRI/Avastin was on 10/11/2016. CEA 18.4 on 10/11/2016.      Bone scan 10/21/2016 stable bone metastasis; ? New lesion anterior left sixth rib likely interval healing fracture. CT abdomen/pelvis revealed stable hepatic metastasis. CT chest negative for metastatic disease. Continue FOLFIRI/Avastin and repeat scans in 3 months. Cycle # 7 FOLFIRI/Avastin was on 10/25/2016. CEA 16.1  Cycle # 8 FOLFIRI/Avastin was on 11/08/2016. CEA 15.7  Cycle # 9 FOLFIRI/Avastin was on 11/29/2016. CEA 13.6 on 11/29/2016. Cycle # 10 FOLFIRI/Avastin was on 12/13/2016. CEA 10.6 on 12/13/2016. Cycle # 11 FOLFIRI/Avastin was on 12/27/2016. CEA 11.1 on 12/27/2016. Cycle # 12 FOLFIRI/Avastin was on 01/10/2017. CEA 9.7 on 01/10/2017.       CT chest 01/23/2017 No new pulmonary nodule or lymphadenopathy. Patchy groundglass opacities within the left lower lobe, suggestive of infectious or inflammatory etiology. Followup CT chest in 3 months. Slight increased linear sclerosis along the left anterior sixth rib corresponding to an area of increased uptake on the prior bone scan from 10/21/2016, favored to be related to interval healing changes of a nondisplaced fracture. CT abdomen/pelvis on 01/23/2017 Redemonstration of multiple hepatic metastases, which are similar compared to the prior CT from 10/21/2016. Redemonstration of area of increased sclerosis along the right acetabulum suspicious for metastatic disease. Bone scan on 01/23/2017 Stable subtle uptake within the left proximal diaphysis, again suggestive of metastatic disease  Decreased subtle uptake within the medial right acetabulum.    Decreased uptake within the left anterior sixth rib corresponding to linear sclerosis on the recent CT, favored to be related to an joint rib fracture. Continue FOLFIRI + Avastin and repeat scans in 3 months. Cycle # 13 FOLFIRI + Avastin was on 01/24/2017. Cycle # 14 FOLFIRI + Avastin was on 02/07/2017. Cycle # 15 FOLFIRI + Avastin was on 02/21/2017. Cycle # 16 FOLFIRI + Avastin was on 03/07/2017. Cycle # 17 FOLFIRI + Avastin was on 03/21/2017. Cycle # 18 FOLFIRI + Avastin was on 04/04/2017. CT chest 04/17/2017 negative for metastatic disease. CT abdomen/pelvis 04/17/2017 Stable hypodense metastatic lesions within the liver. Stable area of increased sclerosis along the right acetabulum  Bone scan 04/17/2017 Stable subtle uptake within the left proximal femoral diaphysis; No definite abnormal uptake in the region of a sclerotic lesion along the posterior column of the right acetabulum noted on CT. Stable slight uptake within the left anterior sixth rib corresponding to linear sclerosis on the recent CT, favored to be related to a fracture. Continue FOLFIRI + Avastin and repeat scans in 3 months. Cycle # 19 FOLFIRI + Avastin was on 04/18/2017. Cycle # 20 FOLFIRI + Avastin was on 05/02/2017. Cycle # 21 FOLFIRI + Avastin was on 05/16/2017. Cycle # 22 FOLFIRI + Avastin was on 05/30/2017. Cycle # 23 FOLFIRI + Avastin was on 06/13/2017. Cycle # 24 FOLFIRI + Avastin was on 06/27/2017. Cycle # 25 FOLFIRI + Avastin was on 07/11/2017. Cycle # 26 FOLFIRI + Avastin was on 07/25/2017. Cycle # 27 FOLFIRI + Avastin was on 08/08/2017. Cycle # 28 FOLFIRI + Avastin was on 08/22/2017. Cycle # 29 FOLFIRI + Avastin was on 09/05/2017. Cycle # 30 FOLFIRI + Avastin was on 09/19/2017. Bone scan 09/26/2017 stable. CT abdomen/pelvis on 09/26/2017 Stable hypodense mass in the liver. Stable sclerotic lesion in the acetabulum. CT chest 09/26/2017 negative for metastatic disease. Cycle # 31 FOLFIRI + Avastin was on 10/03/2017. CEA 7.4 on 10/03/2017. Cycle # 32 FOLFIRI + Avastin was on 10/17/2017. CEA 6.0 on 10/17/2017.   Cycle # 33 FOLFIRI + Avastin was on 10/31/2017. CEA 6.8 on 10/31/2017. Cycle # 34 FOLFIRI + Avastin was on 11/14/2017. CEA 7.2 on 11/14/2017. Cycle # 35 FOLFIRI + Avastin was on 11/28/2017. CEA 5.1 on 11/28/2017. Cycle # 36 FOLFIRI + Avastin was on 12/12/2017. CEA 6.1 on 12/12/2017. Bone scan 12/22/2017 noted no evidence of metastatic disease to the axial or appendicular skeleton. CT chest 12/22/2017 noted no evidence of metastatic disease. CT abdomen/pelvis 12/22/2017 noted stable hypodense lesions in the liver. Continue FOLFIRI + Avastin and repeat scans in 3 months. Cycle # 37 FOLFIRI + Avastin was on 12/27/2018. Cycle # 38 FOLFIRI + Avastin was on 01/09/2018. Cycle # 39 FOLFIRI + Avastin was on 01/23/2018. Cycle # 40 FOLFIRI + Avastin was on 02/06/2018. Cycle # 41 FOLFIRI + Avastin was on 02/20/2018. Cycle # 42 FOLFIRI + Avastin was on 03/06/2018. Cycle # 43 FOLFIRI + Avastin was on 03/20/2018. Bone scan on 03/26/2018 negative for metastatic disease. CT chest 03/26/2018 noted ? new 7 mm nodule in LUCY. CT abdomen/pelvis 03/26/2018 noted stable hypodense lesions in the liver. ? New small ascites in the abdomen. Patient wants to continue to monitor the lung finding and repeat scans in 2 months instead of changing the current regimen into oral Lonsurf. Cycle # 44 FOLFIRI + Avastin was on 04/03/2018. CEA 6.3 on 04/03/2018. Cycle # 45 FOLFIRI + Avastin was on 04/24/2018. CEA 5.3 on 04/24/2018. Cycle # 46 FOLFIRI + Avastin was on 05/08/2018. CEA 5.4 on 05/08/2018. Cycle # 47 FOLFIRI + Avastin was on 05/22/2018. CEA 6.1 on 05/22/2018. CT chest 05/31/2018 noted stable nodule in LUCY. No evidence of worsening malignancy. CT abdomen/pelvis on 05/31/2018 noted stable liver metastasis. No evidence of worsening malignancy. Continue FOLFIRI + Avastin and repeat scans in 3 months. Cycle # 48 FOLFIRI + Avastin was on 06/06/2018. Cycle # 49 FOLFIRI + Avastin was on 06/19/2018.    Cycle # 50 FOLFIRI + Avastin was on 07/03/2018. Cycle # 51 FOLFIRI + Avastin was on 07/24/2018. Cycle # 52 FOLFIRI + Avastin was on 08/14/2018. Cycle # 53 FOLFIRI + Avastin was on 08/28/2018. CT chest 09/06/2018 noted interval decrease in size of LUCY measuring up to 4 mm, suggestive of treatment response. No mediastinal or hilar LN  CT abdomen/pelvis 09/06/2018 Slight interval increase in size of a previously noted metastatic lesion within the right hepatic lobe. This may be related to differences in phase of enhancement compared to the most recent study from 5/31/2018, the lesion remains decreased in size compared to the more previous studies. No abdominal, retroperitoneal, or pelvic lymphadenopathy. Continue FOLFIRI + Avastin and repeat scans in 2 months. Cycle # 54 FOLFIRI + Avastin was on 09/11/2018. Cycle # 55 FOLFIRI + Avastin was on 09/25/2018. Cycle # 56 FOLFIRI + Avastin was on 10/09/2018. Cycle # 57 FOLFIRI + Avastin was on 10/23/2018. CT chest 11/02/2018 stable 3 mm nodule within LUCY. No new right and left lung nodule. No mediastinal or osseous lesion. CT abdomen/pelvis 11/02/2018 stable metastatic disease to liver. No new metastatic disease identified. No mesenteric or retroperitoneal LN. No gross colonic lesion identified. Continue FOLFIRI + Avastin and repeat scans in 3 months. Cycle # 58 FOLFIRI + Avastin was on 11/06/2018. CEA 7.6 on 11/05/2018. Cycle # 59 FOLFIRI + Avastin was on 11/27/2018. CEA 6.9 on 11/26/2018. Cycle # 60 FOLFIRI + Avastin was on 12/11/2018. CEA 6.7 on 12/11/2018. Cycle # 61 FOLFIRI + Avastin was on 01/08/2019. CEA 5.6 on 01/07/2019. Cycle # 62 FOLFIRI + Avastin was on 01/29/2019. CEA 4.6 on 01/28/2019. Cycle # 63 FOLFIRI + Avastin was on 02/12/2019. CEA 4.3 on 02/11/2019. CT chest 02/21/2019 no evidence of metastatic disease. CT abdomen/pelvis 02/21/2019 stable hypodense liver lesions. No evidence of worsening metastatic disease.  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 Ordered MRI thoracic spine and referred to neurosurgery team.    Cycle # 64 FOLFIRI + Avastin was on 02/26/2019. CEA 4.7 on 02/25/2019. Cycle # 65 FOLFIRI + Avastin was on 03/12/2019. CEA 4.0 on 03/11/2019. Cycle # 66 FOLFIRI + Avastin was on 03/26/2019. CEA 4.7 on 03/25/2019. Cycle # 67 FOLFIRI + Avastin was on 04/09/2019. CEA 5.6 on 04/08/2019. Cycle # 68 FOLFIRI + Avastin was on 04/30/2019. CEA 4.9 on 04/29/2019. Cycle # 69 FOLFIRI + Avastin was on 05/14/2019. CEA 5.3 on 05/14/2019. CT chest 05/23/2019 stable small lung nodule with no evidence of metastatic disease. CT abdomen/pelvis 05/23/2019 Decrease conspicuity of the liver lesions. No new lesions seen. Stable splenomegaly. Continue FOLFIRI + Avastin and repeat scans in 3 months. Cycle # 70 FOLFIRI + Avastin was on 06/04/2019. CEA 4.8 on 06/03/2019. Cycle # 71 FOLFIRI + Avastin was on 06/18/2019. CEA 4.6 on 06/17/2019. Cycle # 72 FOLFIRI + Avastin was on 07/09/2019. CEA 4.3 on 07/08/2019. Cycle # 73 FOLFIRI + Avastin was on 07/30/2019. CEA 5.0 on 07/29/2019. Cycle # 74 FOLFIRI + Avastin was on 08/13/2019. CEA 5.0 on 08/12/2019. CT chest 08/20/2019 negative  CT abdomen/pelvis 08/20/2019 Previous identified hepatic lesions are not visualized on the current exam.  Continue FOLFIRI + Avastin and repeat scans in 3 months. Cycle # 75 FOLFIRI + Avastin was on 08/27/2019. CEA 5.0 on 08/26/2019. Cycle # 76 FOLFIRI + Avastin was on 09/17/2019. CEA 5.5 on 09/16/2019. Cycle # 77 FOLFIRI + Avastin was on 10/15/2019. CEA 4.6 on 10/15/2019. Cycle # 78 FOLFIRI + Avastin was on 10/29/2019. CEA 4.1 on 10/28/2019. Cycle # 79 FOLFIRI + Avastin was on 11/12/2019. CEA 4.3 on 11/12/2019. Cycle # 80 FOLFIRI + Avastin was on 12/10/2019. CEA 4.0 on 12/09/2019.   CT chest 12/19/2019 Stable 3 mm nodule within the left upper lobe, similar to the previous studies dating back to 11/2/2018, however decreased in size compared to the CT from 3/26/2018. No thoracic LN. CT abdomen/pelvis 12/19/2019 Previously described small hypodense lesions are more conspicuous on the current study compared to the recent study throughout the liver from 8/20/2019, however similar to the prior study from 2/21/2019. Findings may be related to differences in contrast opacification. No abdominal or pelvic lymphadenopathy. Continue FOLFIRI + Avastin and repeat scans in 2 months to f/u on liver lesions. Cycle # 81 FOLFIRI + Avastin was on 01/07/2020. CEA 3.8 on 01/06/2020. Cycle # 82 FOLFIRI + Avastin was on 01/21/2020. CEA 3.7 on 01/20/2020. Cycle # 83 FOLFIRI + Avastin was on 02/04/2020. CEA 4.1 on 02/03/2020. Cycle # 84 FOLFIRI + Avastin was on 02/18/2020. CEA 4.6 on 02/17/2020. EGD by Dr. Bird Mulligan 03/02/2020 showing no masses or lesions  Cycle # 85 FOLFIRI + Avastin was on 03/03/2020. CEA 7.4 on 03/02/2020. Cycle # 86 FOLFIRI + Avastin was on 03/17/2020. CEA 4.5 on 03/16/2020. Colonoscopy on 03/23/2020 by Dr. Bird Mulligan unremarkable (records requested). Cycle # 87 FOLFIRI + Avastin was on 03/31/2020. CEA 4.1 on 03/30/2020. Cycle # 88 FOLFIRI + Avastin was on 04/21/2020. CEA 6.4 on 04/20/2020. Cycle # 89 FOLFIRI + Avastin was on 05/05/2020. CEA 5.8 on 05/04/2020. Cycle # 90 FOLFIRI + Avastin was on 06/02/2020. CEA 5.5 on 06/01/2020. Cycle # 91 FOLFIRI + Avastin was on 06/16/2020. CEA 5.6 on 06/15/2020. CT chest 06/23/2020 noted no metastatic disease in the chest.   CT abdomen/pelvis 06/23/2020 Stable small liver lesions measuring up to 5 mm since 2019.   22 mm round mass in segment 8 of the liver with central high density stable from December 2019), previously measuring up to 34 mm in 2017. No additional metastatic disease in the abdomen or pelvis. Small amount of ascites. Overall stable disease. Continue FOLFIRI + Avastin and repeat scans in 2-3 months. Cycle # 92 FOLFIRI + Avastin was on 07/07/2020. CEA 5.7 on 07/06/2020. Cycle # 93 FOLFIRI + Avastin was on 07/21/2020. CEA 5.9 on 07/20/2020. Cycle # 94 FOLFIRI + Avastin was on 08/04/2020. CEA 5.5 on 08/04/2020. Cycle # 95 FOLFIRI + Avastin was on 08/25/2020. CEA 6.1 on 08/24/2020. CT chest 9/2/2020 stable unremarkable enhanced CT of the thorax. There is no metastatic disease to the lungs. CT abdomen pelvis on 9/2/2020 stable 2.2 cm low attenuated lesion within the central aspect of the right hepatic lobe favored to represent treated metastatic disease. No new hepatic lesion is identified. Stable splenomegaly  There is no appreciable colonic lesion or stricture  Pericholecystic fluid of uncertain etiology. General surgery team consulted. Laparoscopic cholecystectomy with normal cholangiogram 10/27/20  Gallbladder: Chronic cholecystitis and cholelithiasis.  Unremarkable regional lymph node. 11/13/2020; Seen by Dr. Anita Taylor from General surgery team; cleared to re-start chemotherapy. Cycle # 96 FOLFIRI + Avastin was on 11/17/2020. CEA 3.6 on 11/16/2020. Cycle # 97 FOLFIRI + Avastin was on 12/01/2020. CEA 3.5 on 11/30/2020. Cycle # 98 FOLFIRI + Avastin was on 12/15/2020. CEA 4.3 on 12/15/2020. Cycle # 99 FOLFIRI + Avastin was on 01/05/2021. CEA 4.7 on 01/04/2021. CT chest 01/13/2021 negative for metastatic disease. CT abdomen/pelvis 01/13/2021 Unchanged central hepatic lesion. No specific signs of abdominopelvic metastatic disease. Continue FOLFIRI + Avastin and repeat scans in 3 months. Cycle # 100 FOLFIRI + Avastin was on 01/19/2021. CEA 4.7 on 01/18/2021. Cycle # 101 FOLFIRI + Avastin was on 02/02/2021. CEA 5.2 on 02/01/2021. Cycle # 102 FOLFIRI + Avastin was on 02/16/2021. CEA 5.6 on 02/15/2021. Cycle # 103 FOLFIRI + Avastin was on 03/02/2021. Cycle # 104 FOLFIRI (20% dose reduced due to significant toxicity) + Avastin was on 03/16/2021. Cycle # 105 FOLFIRI (same dose as cycle # 104) + Avastin was on 03/30/2021. CEA 6.5 on 03/29/2021.   Cycle # 106 FOLFIRI (same dose as cycle # 104) + Avastin was on 04/13/2021. CEA 6.0 on 04/12/2021  CT chest 04/20/2021 no evidence of metastatic disease. CT abdomen/pelvis 04/20/2021 No evidence of progressive metastatic disease. Stable 2 cm lesion in the right lobe of the liver, likely representing treated metastatic disease. Imaging reviewed. Continue FOLFIRI + Avastin and repeat scans in 3 months  Cycle # 107 FOLFIRI + Avastin was on 04/27/2021  Cycle # 108 FOLFIRI + Avastin was on 05/11/2021. Cycle # 109 FOLFIRI (held Avastin due to upcoming surgery) on 05/25/2021. CEA 6.3 on 5/24/21  Cycle # 110 FOLFIRI (held Avastin due to upcoming surgery) on 06/08/2021. CEA 6.3 on 6/07/21. Right inguinal/scrotol hernia repair on 07/13/2021 with Dr Danica Jansen with folely removal on Friday 07/23/2021. He developed fever on 07/24/2021 and was brought to [address] [place] [address],[address] ER via EMS; Sepsis secondary to urinary tract infection   Completed Abx  CEA 4.1 on 08/30/2021  Cycle # 111 FOLFIRI was on 08/31/2021. HBP team for evaluation of liver lesions; His lesion does appear to be a hemangioma as his prior metastatic deposits in his liver were hypo attenuating compared to this CT which is hyper attenuating. compared to his prior CT scans he has disappearing liver metastasis. MRI liver on 09/17/2021 No suspicious liver lesion identified. Colonoscopy GI Dr. Tracy Chatterjee on 09/20/2021 unremarkable  CEA 3.3 on 09/27/2021. CEA 3.2 on 10/25/2021. CEA 2.7 on 11/22/2021. CEA 2.5 on 12/27/2021. CEA 2.4 on 01/24/2022. CEA 2.3 on 02/21/2022. CEA 2.4 on 03/21/2022. CEA 2.2 on 04/18/2022  CEA 2.2 on 05/16/2022  CEA 2.3 on 06/06/2022    Today 06/07/2022 for f/u. No fever, chills. No nausea/vomiting. Fair appetite and energy level. + arthritis    Review of Systems;  CONSTITUTIONAL: No fever, chills. Fair appetite and energy level  ENMT: Eyes: No diplopia; Nose: No epistaxis. Mouth:No lesions  RESPIRATORY: No hemoptysis, shortness of breath.    CARDIOVASCULAR: No chest pain, palpitations. GASTROINTESTINAL:No N/V, abdominal pain. GENITOURINARY: No dysuria, urinary frequency, hematuria. MSK: + Arthritis   NEURO: No syncope, presyncope, headache. Remainder ROS: Negative. Past Medical History:   Past Medical History               Diagnosis Date    Arthritis      Cancer ([facility] [address]         colon    Depression      Hyperlipidemia      Hypertension           Medications:  Reviewed and reconciled.     Allergies: Allergies   Allergen Reactions    Neosporin [Neomycin-Polymyxin-Gramicidin] Rash    Tape Ressie Men Tape] Rash      Physical Exam:  BP (!) 140/70   Pulse 62   Temp 98 °F (36.7 °C)   Ht 6' 1\" (1.854 m)   Wt 212 lb (96.2 kg)   SpO2 99%   BMI 27.97 kg/m²   GENERAL: Alert, oriented x 3, not in distress  HEENT: PERRLA, EOMI. NECK: Supple. Without lymphadenopathy. LUNGS: CTA bilaterally, with no wheezing, crackles or rhonchi. CARDIOVASCULAR: Regular rhythm. No murmurs, rubs or gallops. ABDOMEN: Soft. Non-tender, non-distended. EXTREMITIES: Without clubbing, cyanosis  NEUROLOGIC: No focal deficits. ECOG PS 1    Diagnostics:  Lab Results   Component Value Date    WBC 3.8 (L) 06/06/2022    HGB 10.8 (L) 06/06/2022    HCT 33.4 (L) 06/06/2022    MCV 93.6 06/06/2022     06/06/2022     Lab Results   Component Value Date     06/06/2022    K 4.3 06/06/2022     06/06/2022    CO2 25 06/06/2022    BUN 28 (H) 06/06/2022    CREATININE 1.4 (H) 06/06/2022    GLUCOSE 98 06/06/2022    CALCIUM 10.4 (H) 06/06/2022    PROT 8.2 06/06/2022    LABALBU 4.3 06/06/2022    BILITOT 0.9 06/06/2022    ALKPHOS 143 (H) 06/06/2022    AST 26 06/06/2022    ALT 15 06/06/2022    LABGLOM 50 06/06/2022    GFRAA >60 06/06/2022     Lab Results   Component Value Date    CEA 2.3 06/06/2022     Impression/Plan:  68 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 KRAS Mutation: Mutation detected. BRAF Mutation: Mutation not detected.   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 CT scan abdomen/pelvis on 10/26/2015 revealed small nodules at the lung bases, extensive liver lesions consistent with metastatic rectosigmoid cancer. CEA 3488 on 10/30/2015. Bone scan on 11/10/2015 noted no metastatic disease. CT chest on 11/10/2015 revealed Multiple pulmonary nodules in the upper and lower lobes consistent with metastatic disease; Hepatic metastasis also visualized. 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 Mediport was placed. Cycle # 1 of FOLFOX + Avastin was on 11/23/2015. CEA was 4555 on 11/23/2015. Cycle # 2 of FOLFOX + Avastin was on 12/08/2015. CEA was 3160 on 12/08/2015. Cycle # 3 of FOLFOX + Avastin was on 12/22/2015. CEA was 2516 on 12/21/2015. Cycle # 4 of FOLFOX + Avastin was on 01/05/2016. CEA was 2098 on 01/05/2015. Cycle # 5 of FOLFOX + Avastin was on 01/19/2016. CEA was 1511 on 01/05/2015.     -Bone scan on 01/26/2016 noted no metastatic disease. CT chest on 01/26/2016 revealed Significant response to treatment with no visible residual nodules. CT scan abdomen/pelvis on 01/26/2016 noted Interval decreased size of multiple masses in the liver compatible with treatment response. Continue another 2 months of FOLFOX + Avastin and repeat scans. Cycle # 6 of FOLFOX + Avastin was on 02/02/2016.  on 02/02/2016. Cycle # 7 of FOLFOX + Avastin was on 02/16/2016.  on 02/16/2016. Cycle # 8 of FOLFOX + Avastin was on 03/01/2016.  on 03/01/2016. Cycle # 9 of FOLFOX + Avastin was on 03/15/2016.  on 03/15/2016. Cycle # 10 of FOLFOX + Avastin was on 03/29/2016. .4 on 03/29/2016. Cycle # 11 of FOLFOX + Avastin was on 04/12/2016.  .4 on 04/12/2016.     Re-staging scans on 04/19/2016: CT Chest: clear lungs; no evidence of recurring pulmonary nodule; CT Abdomen/Pelvis: Further interval decrease in size of the multiple metastatic hepatic lesions, the largest lesion now measures 3.9 x 3.5 cm and previously measured 4.5 cm in maximum diameter. No mesenteric lymphadenopathy is identified; Bone Scan: No evidence of osseous metastasis. Continue same regimen and re-stage in 2-3 months. Cycle # 12 FOLFOX + Avastin was on 04/26/2016. .5 on 04/26/2016. Cycle # 13 FOLFOX + Avastin was on 05/10/2016. .3 on 05/10/2016. Cycle # 14 FOLFOX+AVASTIN was on 05/24/2016. .5 on 05/24/2016. Cycle # 15 FOLFOX + Avastin was on 06/07/2016. CEA 90.5 on 06/07/2016. Admitted to [facility] 06/13/2016-06/16/2016 for abdominal pain: EGD noted 1.5 cm clean based duodenal bulb ulceration s/p epinephrine and bicap per Dr. Mayra Mccarthy. No active bleeding. A. Stomach, biopsy: Mild chronic gastritis, immunostain negative for Helicobacter  B. Esophagus, biopsy: Gastric glandular mucosa with prominent ntestinal metaplasia ([place]'s epithelium), negative for epithelial dysplasia, esophageal squamous mucosa not identified     Cycle # 16 FOLFOX (discontinued avastin (bevacizumab) given association of peptic ulcer disease and known association of GI perforation.) was on 06/21/2016. CEA 47 on 06/21/2016. Cycle # 17 FOLFOX was on 07/05/2016. CEA 52.6 on 07/05/2016. CEA 47.6 on 07/19/2016.     Re-staging scans 07/19/2106: CT Chest negative for metastatic disease. CT Abdomen/Pelvis: Stable hepatic lesions; Question new mural thickening in the cecum. Bone Scan: New Let hip lesion suspicious for bone metastasis.     Increased CEA; new mural thickening in the cecum and new left hip lesion suspicious for bone metastasis are consistent with disease progression; He derived maximum benefit from FOLFOX/AVASTIN. D/C FOLFOX/AVASTIN. We recommended FOLFIRI second line therapy. Cycle # 1 FOLFIRI was on 08/02/2016. CEA 40.8 on 08/02/2016. Xgeva q4 weeks started on 08/02/2016. Cycle # 2 FOLFIRI was on 08/16/2016. CEA 31.7 on 08/16/2016.   Cycle # 3 FOLFIRI (added Avastin) was on 08/30/2016 given that ulcers healed on EGD 08/15/2016 by Dr. Rosanne Chung; Protonix bid since avastin re-started. Colonoscopy on 08/29/2016 (to look at the cecal area) unremarkable. CEA 26.7 on 08/30/2016. Cycle # 4 FOLFIRI/Avastin was on 09/13/2016. CEA 23.4 on 09/13/2016. Cycle # 5 FOLFIRI/Avastin was on 09/27/2016. CEA 22.3 on 09/27/2016. Cycle # 6 FOLFIRI/Avastin was on 10/11/2016. CEA 18.4 on 10/11/2016.      Bone scan 10/21/2016 stable bone metastasis; ? New lesion anterior left sixth rib likely interval healing fracture. CT abdomen/pelvis revealed stable hepatic metastasis. CT chest negative for metastatic disease. Continue FOLFIRI/Avastin and repeat scans in 3 months. Cycle # 7 FOLFIRI/Avastin was on 10/25/2016. CEA 16.1 on 10/25/2016. Cycle # 8 FOLFIRI/Avastin was on 11/08/2016. CEA 15.7 on 11/08/2016. Cycle # 9 FOLFIRI/Avastin was on 11/29/2016. CEA 13.6 on 11/29/2016. Cycle # 10 FOLFIRI/Avastin was on 12/13/2016. CEA 10.6 on 12/13/2016. Cycle # 11 FOLFIRI/Avastin was on 12/27/2016. CEA 11.1 on 12/27/2016. Cycle # 12 FOLFIRI/Avastin was on 01/10/2017. CEA 9.7 on 01/10/2017. CT chest 01/23/2017 No new pulmonary nodule or lymphadenopathy. Patchy groundglass opacities within the left lower lobe, suggestive of infectious or inflammatory etiology. Followup CT chest in 3 months. Slight increased linear sclerosis along the left anterior sixth rib corresponding to an area of increased uptake on the prior bone scan from 10/21/2016, favored to be related to interval healing changes of a nondisplaced fracture. CT abdomen/pelvis on 01/23/2017 Redemonstration of multiple hepatic metastases, which are similar compared to the prior CT from 10/21/2016. Redemonstration of area of increased sclerosis along the right acetabulum suspicious for metastatic disease.    Bone scan on 01/23/2017 Stable subtle uptake within the left proximal diaphysis, again suggestive of metastatic disease  Decreased subtle uptake within the medial right acetabulum. Decreased uptake within the left anterior sixth rib corresponding to linear sclerosis on the recent CT, favored to be related to an joint rib fracture. Continue FOLFIRI + Avastin and repeat scans in 3 months. Cycle # 13 FOLFIRI + Avastin was on 01/24/2017. Cycle # 14 FOLFIRI + Avastin was on 02/07/2017. Cycle # 15 FOLFIRI + Avastin was on 02/21/2017. Cycle # 16 FOLFIRI + Avastin was on 03/07/2017. Cycle # 17 FOLFIRI + Avastin was on 03/21/2017. Cycle # 18 FOLFIRI + Avastin was on 04/04/2017. CT chest 04/17/2017 negative for metastatic disease. CT abdomen/pelvis 04/17/2017 Stable hypodense metastatic lesions within the liver. Stable area of increased sclerosis along the right acetabulum  Bone scan 04/17/2017 Stable subtle uptake within the left proximal femoral diaphysis; No definite abnormal uptake in the region of a sclerotic lesion along the posterior column of the right acetabulum noted on CT. Stable slight uptake within the left anterior sixth rib corresponding to linear sclerosis on the recent CT, favored to be related to a fracture. Continue FOLFIRI + Avastin and repeat scans in 3 months. Cycle # 19 FOLFIRI + Avastin was on 04/18/2017. CEA 7.5 on 04/18/2017. Cycle # 20 FOLFIRI + Avastin was on 05/02/2017. CEA 7.7 on 05/02/2017. Cycle # 21 FOLFIRI + Avastin was on 05/16/2017. CEA 7.1 on 05/16/2017. Cycle # 22 FOLFIRI + Avastin was on 05/30/2017. CEA 8.2 on 05/30/2017. Cycle # 23 FOLFIRI + Avastin was on 06/13/2017. CEA 7.7 on 06/13/2017. Cycle # 24 FOLFIRI + Avastin was on 06/27/2017. CEA 6.6 on 06/27/2017. Re-staging scans 07/05/2017:  Bone scan stable proximal left femoral diaphysis, right acetabulum, and left anterior sixth rib lesions;  CT abdomen/pelvis stable hypodense metastatic lesions within the liver; CT chest without convincing evidence of metastatic disease. Cycle # 25 FOLFIRI + Avastin was on 07/11/2017.   CEA 6.3 on 07/11/2017. Cycle # 26 FOLFIRI + Avastin was on 07/25/2017. CEA 7.3 on 07/25/2017. Cycle # 27 FOLFIRI + Avastin was on 08/08/2017. CEA 6.5 on 08/08/2017. Cycle # 28 FOLFIRI + Avastin was on 08/22/2017. CEA 5.9 on 08/22/2017. Cycle # 29 FOLFIRI + Avastin was on 09/05/2017. CEA 6.3 on 09/05/2017. Cycle # 30 FOLFIRI + Avastin was on 09/19/2017. CEA 6.3 on 09/19/2017. Bone scan 09/26/2017 stable. CT abdomen/pelvis on 09/26/2017 Stable hypodense mass in the liver. Stable sclerotic lesion in the acetabulum. CT chest 09/26/2017 negative for metastatic disease. Cycle # 31 FOLFIRI + Avastin was on 10/03/2017. CEA 7.4 on 10/03/2017. Cycle # 32 FOLFIRI + Avastin was on 10/17/2017. CEA 6.0 on 10/17/2017. Cycle # 33 FOLFIRI + Avastin was on 10/31/2017. CEA 6.8 on 10/31/2017. Cycle # 34 FOLFIRI + Avastin was on 11/14/2017. CEA 7.2 on 11/14/2017. Cycle # 35 FOLFIRI + Avastin was on 11/28/2017. CEA 5.1 on 11/28/2017. Cycle # 36 FOLFIRI + Avastin was on 12/12/2017. CEA 6.1 on 12/12/2017. Bone scan 12/22/2017 noted no evidence of metastatic disease to the axial or appendicular skeleton. CT chest 12/22/2017 noted no evidence of metastatic disease. CT abdomen/pelvis 12/22/2017 noted stable hypodense lesions in the liver. Continue FOLFIRI + Avastin and repeat scans in 3 months. Cycle # 37 FOLFIRI + Avastin was on 12/27/2018. CEA 6.7 on 12/27/2017. Cycle # 38 FOLFIRI + Avastin was on 01/09/2018. CEA 5.0 on 01/09/2018. Cycle # 39 FOLFIRI + Avastin was on 01/23/2018. CEA 6.5 on 01/23/2018. Cycle # 40 FOLFIRI + Avastin was on 02/06/2018. CEA 6.9 on 02/06/2018. Cycle # 41 FOLFIRI + Avastin was on 02/20/2018. CEA 6.4 on 02/20/2018. Cycle # 42 FOLFIRI + Avastin was on 03/06/2018. CEA 6.6 on 03/06/2018. Cycle # 43 FOLFIRI + Avastin was on 03/20/2018. CEA 5.7 on 03/20/2018. Bone scan on 03/26/2018 negative for metastatic disease. CT chest 03/26/2018 noted ? new 7 mm nodule in LUCY.    CT abdomen/pelvis 03/26/2018 noted stable hypodense lesions in the liver. ? New small ascites in the abdomen. Patient wants to continue to monitor the lung finding and repeat scans in 2 months instead of changing the current chemotherapy regimen to oral Lonsurf. Cycle # 44 FOLFIRI + Avastin was on 04/03/2018. CEA 6.3 on 04/03/2018. Cycle # 45 FOLFIRI + Avastin was on 04/24/2018. CEA 5.3 on 04/24/2018. Cycle # 46 FOLFIRI + Avastin was on 05/08/2018. CEA 5.4 on 05/08/2018. Cycle # 47 FOLFIRI + Avastin was on 05/22/2018. CEA 6.1 on 05/22/2018. CT chest 05/31/2018 noted stable nodule in LUCY. No evidence of worsening malignancy. CT abdomen/pelvis on 05/31/2018 noted stable liver metastasis. No evidence of worsening malignancy. Continue FOLFIRI + Avastin and repeat scans in 3 months. Cycle # 48 FOLFIRI + Avastin was on 06/06/2018. CEA 6.3 on 06/05/2018. Cycle # 49 FOLFIRI + Avastin was on 06/19/2018. CEA 6.1 on 06/19/2018. Cycle # 50 FOLFIRI + Avastin was on 07/03/2018. CEA 7.4 on 07/03/2018. Cycle # 51 FOLFIRI + Avastin was on 07/24/2018. CEA 6.7 on 07/24/2018. Cycle # 52 FOLFIRI + Avastin was on 08/14/2018. CEA 6.8 on 08/14/2018. Cycle # 53 FOLFIRI + Avastin was on 08/28/2018. CEA 6.5 on 08/27/2018. CT chest 09/06/2018 noted interval decrease in size of LUCY measuring up to 4 mm, suggestive of treatment response. No mediastinal or hilar LN  CT abdomen/pelvis 09/06/2018 Slight interval increase in size of a previously noted metastatic lesion within the right hepatic lobe. This may be related to differences in phase of enhancement compared to the most recent study from 5/31/2018, the lesion remains decreased in size compared to the more previous studies. No abdominal, retroperitoneal, or pelvic lymphadenopathy. Continue FOLFIRI + Avastin and repeat scans in 2 months. Cycle # 54 FOLFIRI + Avastin was on 09/11/2018. CEA 6.4 on 09/10/2018. Cycle # 55 FOLFIRI + Avastin was on 09/25/2018. CEA 6.4 on 09/25/2018.    Cycle # 64 FOLFIRI + Avastin was on 10/09/2018. CEA 6.7 on 10/08/2018. Cycle # 57 FOLFIRI + Avastin was on 10/23/2018. CEA 7.2 on 10/22/2018. CT chest 11/02/2018 stable 3 mm nodule within LUCY. No new right and left lung nodule. No mediastinal or osseous lesion. CT abdomen/pelvis 11/02/2018 stable metastatic disease to liver. No new metastatic disease identified. No mesenteric or retroperitoneal LN. No gross colonic lesion identified. Continue FOLFIRI + Avastin and repeat scans in 3 months. Cycle # 58 FOLFIRI + Avastin was on 11/06/2018. CEA 7.6 on 11/05/2018. Cycle # 59 FOLFIRI + Avastin was on 11/27/2018. CEA 6.9 on 11/26/2018. Cycle # 60 FOLFIRI + Avastin was on 12/11/2018. CEA 6.7 on 12/11/2018. Cycle # 61 FOLFIRI + Avastin was on 01/08/2019. CEA 5.6 on 01/07/2019. Cycle # 62 FOLFIRI + Avastin was on 01/29/2019. CEA 4.6 on 01/28/2019. Cycle # 63 FOLFIRI + Avastin was on 02/12/2019. CEA 4.3 on 02/11/2019. CT chest 02/21/2019 no evidence of metastatic disease. CT abdomen/pelvis 02/21/2019 stable hypodense liver lesions. No evidence of worsening metastatic disease. 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 Ordered MRI thoracic spine and referred to neurosurgery team.  Cycle # 64 FOLFIRI + Avastin was on 02/26/2019. CEA 4.7 on 02/25/2019. Cycle # 65 FOLFIRI + Avastin was on 03/12/2019. CEA 4.0 on 03/11/2019. Cycle # 66 FOLFIRI + Avastin was on 03/26/2019. CEA 4.7 on 03/25/2019. Cycle # 67 FOLFIRI + Avastin was on 04/09/2019. CEA 5.6 on 04/08/2019. Cycle # 68 FOLFIRI + Avastin was on 04/30/2019. CEA 4.9 on 04/29/2019. Cycle # 69 FOLFIRI + Avastin was on 05/14/2019. CEA 5.3 on 05/14/2019. CT chest 05/23/2019 stable small lung nodule with no evidence of metastatic disease. CT abdomen/pelvis 05/23/2019 Decrease conspicuity of the liver lesions. No new lesions seen. Stable splenomegaly. Continue FOLFIRI + Avastin and repeat scans in 3 months.   Cycle # 70 FOLFIRI + Avastin was on 06/04/2019. CEA 4.8 on 06/03/2019. Cycle # 71 FOLFIRI + Avastin was on 06/18/2019. CEA 4.6 on 06/17/2019. Cycle # 72 FOLFIRI + Avastin was on 07/09/2019. CEA 4.3 on 07/08/2019. Cycle # 73 FOLFIRI + Avastin was on 07/30/2019. CEA 5.0 on 07/29/2019. Cycle # 74 FOLFIRI + Avastin was on 08/13/2019. CEA 5.0 on 08/12/2019. CT chest 08/20/2019 negative  CT abdomen/pelvis 08/20/2019 Previous identified hepatic lesions are not visualized on the current exam.  Continue FOLFIRI + Avastin and repeat scans in 3 months. Cycle # 75 FOLFIRI + Avastin was on 08/27/2019. CEA 5.0 on 08/26/2019. Cycle # 76 FOLFIRI + Avastin was on 09/17/2019. CEA 5.5 on 09/16/2019. Cycle # 77 FOLFIRI + Avastin was on 10/15/2019. CEA 4.6 on 10/15/2019. Cycle # 78 FOLFIRI + Avastin was on 10/29/2019. CEA 4.1 on 10/28/2019. Cycle # 79 FOLFIRI + Avastin was on 11/12/2019. CEA 4.3 on 11/12/2019. Cycle # 80 FOLFIRI + Avastin was on 12/10/2019. CEA 4.0 on 12/09/2019. CT chest 12/19/2019 Stable 3 mm nodule within the left upper lobe, similar to the previous studies dating back to 11/2/2018, however decreased in size compared to the CT from 3/26/2018. No thoracic LN. CT abdomen/pelvis 12/19/2019 Previously described small hypodense lesions are more conspicuous on the current study compared to the recent study throughout the liver from 8/20/2019, however similar to the prior study from 2/21/2019. Findings may be related to differences in contrast opacification. No abdominal or pelvic lymphadenopathy. Continue FOLFIRI + Avastin and repeat scans in 2 months to f/u on liver lesions. Cycle # 81 FOLFIRI + Avastin was on 01/07/2020. CEA 3.8 on 01/06/2020. Cycle # 82 FOLFIRI + Avastin was on 01/21/2020. CEA 3.7 on 01/20/2020. Cycle # 83 FOLFIRI + Avastin was on 02/04/2020. CEA 4.1 on 02/03/2020. Cycle # 84 FOLFIRI + Avastin was on 02/18/2020. CEA 4.6 on 02/17/2020.   CT chest 2/28/2020 no evidence of metastatic disease to the lungs and no mediastinal or hilar adenopathy. CT abdomen pelvis 2/28/2020 no enhancing lesions seen within the liver to suggest metastatic disease. Wall thickening of the rectosigmoid junction. Images reviewed. Continue FOLFIRI Avastin and repeat scans in 3 months  EGD by Dr. Roopa Brown 03/02/2020 showing no masses or lesions. Cycle # 85 FOLFIRI + Avastin was on 03/03/2020. CEA 7.4 on 03/02/2020. Cycle # 86 FOLFIRI + Avastin was on 03/17/2020. CEA 4.5 on 03/16/2020. Colonoscopy on 03/23/2020 by Dr. Roopa aguero (records requested). Cycle # 87 FOLFIRI + Avastin was on 03/31/2020. CEA 4.1 on 03/30/2020. Cycle # 88 FOLFIRI + Avastin was on 04/21/2020. CEA 6.4 on 04/20/2020. Cycle # 89 FOLFIRI + Avastin was on 05/05/2020. CEA 5.8 on 05/04/2020. Cycle # 90 FOLFIRI + Avastin was on 06/02/2020. CEA 5.5 on 06/01/2020. Cycle # 91 FOLFIRI + Avastin was on 06/16/2020. CEA 5.6 on 06/15/2020. CT chest 06/23/2020 noted no metastatic disease in the chest.   CT abdomen/pelvis 06/23/2020 Stable small liver lesions measuring up to 5 mm since 2019.   22 mm round mass in segment 8 of the liver with central high density stable from December 2019), previously measuring up to 34 mm in 2017. No additional metastatic disease in the abdomen or pelvis. Small amount of ascites. Overall stable disease. Continue FOLFIRI + Avastin and repeat scans in 2-3 months. Cycle # 92 FOLFIRI + Avastin was on 07/07/2020. CEA 5.7 on 07/06/2020. Cycle # 93 FOLFIRI + Avastin was on 07/21/2020. CEA 5.9 on 07/20/2020. Cycle # 94 FOLFIRI + Avastin was on 08/04/2020. CEA 5.5 on 08/04/2020. Cycle # 95 FOLFIRI + Avastin was on 08/25/2020. CEA 6.1 on 08/24/2020. CT chest 9/2/2020 stable unremarkable enhanced CT of the thorax. There is no metastatic disease to the lungs. CT abdomen pelvis on 9/2/2020 stable 2.2 cm low attenuated lesion within the central aspect of the right hepatic lobe favored to represent treated metastatic disease.   No new hepatic lesion is identified. Stable splenomegaly  There is no appreciable colonic lesion or stricture. Pericholecystic fluid of uncertain etiology. Laparoscopic cholecystectomy with normal cholangiogram 10/27/20  Gallbladder: Chronic cholecystitis and cholelithiasis.  Unremarkable regional lymph node. 11/13/2020; Seen by Dr. Lynette Leary from General surgery team; cleared to re-start chemotherapy. Cycle # 96 FOLFIRI + Avastin was on 11/17/2020. CEA 3.6 on 11/16/2020. Cycle # 97 FOLFIRI + Avastin was on 12/01/2020. CEA 3.5 on 11/30/2020. Cycle # 98 FOLFIRI + Avastin was on 12/15/2020. CEA 4.3 on 12/15/2020. Cycle # 99 FOLFIRI + Avastin was on 01/05/2021. CEA 4.7 on 01/04/2021. CT chest 01/13/2021 negative for metastatic disease. CT abdomen/pelvis 01/13/2021 Unchanged central hepatic lesion. No specific signs of abdominopelvic metastatic disease. Continue FOLFIRI + Avastin and repeat scans in 3 months. Cycle # 100 FOLFIRI + Avastin was on 01/19/2021. CEA 4.7 on 01/18/2021. Cycle # 101 FOLFIRI + Avastin was on 02/02/2021. CEA 5.2 on 02/01/2021. Cycle # 102 FOLFIRI + Avastin was on 02/16/2021. CEA 5.6 on 02/15/2021. Cycle # 103 FOLFIRI + Avastin was on 03/02/2021. Cycle # 104 FOLFIRI (20% dose reduced due to significant toxicity) + Avastin was on 03/16/2021. Cycle # 105 FOLFIRI (same dose as cycle # 104) + Avastin was on 03/30/2021. CEA 6.5 on 03/29/2021. Cycle # 106 FOLFIRI (same dose as cycle # 104) + Avastin was on 04/13/2021. CEA 6.0 on 04/12/2021  CT chest 04/20/2021 no evidence of metastatic disease. CT abdomen/pelvis 04/20/2021 No evidence of progressive metastatic disease. Stable 2 cm lesion in the right lobe of the liver, likely representing treated metastatic disease. Imaging reviewed. Continue FOLFIRI + Avastin and repeat scans in 3 months  Cycle # 107 FOLFIRI + Avastin was on 04/27/2021  Cycle # 108 FOLFIRI + Avastin was on 05/11/2021. CEA 6.4 on 05/10/2021.    Cycle # 109 FOLFIRI (held Avastin due to upcoming surgery) on 05/25/2021. CEA 6.3 on 5/24/21  Cycle # 110 FOLFIRI (held Avastin due to upcoming surgery) on 06/08/2021. CEA 6.3 on 6/07/21. CEA 5.8 on 06/28/2021. Right inguinal/scrotol hernia repair on 07/13/2021 with Dr Carson Radford with folely removal on 07/23/2021. He developed fever on 07/24/2021 and was brought to [address] [place] [address] ER via EMS; Sepsis secondary to urinary tract infection; Completed Abx  CT chest 08/13/2021 no sign of recurrent or metastatic disease. CT abdomen/pelvis 08/13/2021 unchanged hepatic lesions. Splenomegaly and secondary signs of portal venous hypertension  CEA 4.0 on 08/16/2021  CEA 4.1 on 08/30/2021  Cycle # 111 FOLFIRI was on 08/31/2021. HBP team for evaluation of liver lesions; His lesion does appear to be a hemangioma as his prior metastatic deposits in his liver were hypo attenuating compared to this CT which is hyper attenuating. compared to his prior CT scans he has disappearing liver metastasis. MRI liver on 09/17/2021 No suspicious liver lesion identified. Colonoscopy GI Dr. Jocelyn Marie on 09/20/2021 unremarkable  CEA 3.3 on 09/27/2021. CEA 3.2 on 10/25/2021. CT chest 11/08/2021: Similar chronic appearing findings.  No acute process or evidence of metastatic disease identified. CT Abdomen/pelvis 11/08/2021: No significant change in the appearance of the liver. Similar appearance of splenomegaly. No interval change. CEA 2.7 on 11/22/2021. CEA 2.5 on 12/27/2021  CEA 2.4 on 01/24/2022  CT chest/abdomen/pelvis 02/04/2022 Stable CT of the chest abdomen/ pelvis with the a 1.4 cm enhancing nodule in the right hepatic lobe which is marginally improved. Imaging reviewed. CEA 2.3 on 02/21/2022  CEA 2.4 on 03/21/2022  CEA 2.2 on 04/18/2022  CT chest abdomen pelvis 5/11/2022: progressive hepatic metastasis with increasing number and prominence of the hepatic lesions  Soft tissue density in the ileocecal valve probably fecal matter.    CEA 2.2 on

## 2022-06-13 NOTE — PROGRESS NOTES
Hepatobiliary and Pancreatic Surgery Attending History and Physical    Patient's Name/Date of Birth: Bard Dahl /1950 (67 y.o.)      CC: Metastatic Colon Cancer to the Liver    HPI:  Patient had follow up MRI and is seeing me in follow up. He recently had a colonoscopy which wsa negative. He denies any weight loss and is gaining weight. Last chemotherapy was 2021 fall. He does ache a lot. Physical Exam:  /69   Pulse 59   Temp 97.3 °F (36.3 °C) (Temporal)   Resp 16   Ht 6' 1\" (1.854 m)   Wt 214 lb 4.8 oz (97.2 kg)   BMI 28.27 kg/m²     PSYCH: mood and affect normal, alert and oriented x 3: No apparent distress, comfortable  EYES: Sclera white, pupils equal round and reactive to light  ENMT:  Hearing normal, trachea midline, ears externally intact  LYMPH: no obvious lympadenopathy in neck. RESP: Respiratory effort was normal with no retractions or use of accessory muscles. CV:  No pedal edema  GI/ Abdomen: Soft, nondistended, nontender, no guarding, no peritoneal signs  MSK: no clubbing/ no cyanosis/ gaitnormal       Assessment/Plan:  Metastatic colon cancer to the liver  - I reviewed their images prior to our office visit and we also reviewed them together today. - no evidence of any recurrence on repeat imaging  - PetCT also negative  - follow up after next images    20 Minutes of which greater than 50% was spent counseling or coordinating his care. Thank you for the consultation allowing me to take part in Mr Prakash avila.      Please send a copy of my note to Dr. Johanna Kay    Electronically signed by Bunny Carrillo MD on 6/14/2022 at 12:47 PM

## 2022-06-14 ENCOUNTER — OFFICE VISIT (OUTPATIENT)
Dept: SURGERY | Age: 72
End: 2022-06-14
Payer: MEDICARE

## 2022-06-14 VITALS
TEMPERATURE: 97.3 F | HEART RATE: 59 BPM | WEIGHT: 214.3 LBS | DIASTOLIC BLOOD PRESSURE: 69 MMHG | RESPIRATION RATE: 16 BRPM | BODY MASS INDEX: 28.4 KG/M2 | HEIGHT: 73 IN | SYSTOLIC BLOOD PRESSURE: 106 MMHG

## 2022-06-14 DIAGNOSIS — C18.9 METASTATIC COLON CANCER TO LIVER (HCC): Primary | ICD-10-CM

## 2022-06-14 DIAGNOSIS — C78.7 METASTATIC COLON CANCER TO LIVER (HCC): Primary | ICD-10-CM

## 2022-06-14 PROCEDURE — 99213 OFFICE O/P EST LOW 20 MIN: CPT | Performed by: TRANSPLANT SURGERY

## 2022-06-14 PROCEDURE — 1123F ACP DISCUSS/DSCN MKR DOCD: CPT | Performed by: TRANSPLANT SURGERY

## 2022-06-27 LAB
Lab: NORMAL
REPORT: NORMAL
THIS TEST SENT TO: NORMAL

## 2022-07-02 ENCOUNTER — HOSPITAL ENCOUNTER (OUTPATIENT)
Age: 72
Discharge: HOME | End: 2022-07-02
Payer: MEDICARE

## 2022-07-02 DIAGNOSIS — C20 RECTAL CANCER METASTASIZED TO LIVER (HCC): ICD-10-CM

## 2022-07-02 DIAGNOSIS — C78.7 RECTAL CANCER METASTASIZED TO LIVER (HCC): ICD-10-CM

## 2022-07-02 LAB
ALBUMIN SERPL-MCNC: 4.1 G/DL (ref 3.5–5.2)
ALP BLD-CCNC: 125 U/L (ref 40–129)
ALT SERPL-CCNC: 12 U/L (ref 0–40)
ANION GAP SERPL CALCULATED.3IONS-SCNC: 10 MMOL/L (ref 7–16)
AST SERPL-CCNC: 21 U/L (ref 0–39)
BASOPHILS ABSOLUTE: 0 E9/L (ref 0–0.2)
BASOPHILS RELATIVE PERCENT: 0.5 % (ref 0–2)
BILIRUB SERPL-MCNC: 0.5 MG/DL (ref 0–1.2)
BUN BLDV-MCNC: 21 MG/DL (ref 6–23)
CALCIUM SERPL-MCNC: 9.9 MG/DL (ref 8.6–10.2)
CEA: 2.4 NG/ML (ref 0–5.2)
CHLORIDE BLD-SCNC: 105 MMOL/L (ref 98–107)
CO2: 26 MMOL/L (ref 22–29)
CREAT SERPL-MCNC: 1.3 MG/DL (ref 0.7–1.2)
EOSINOPHILS ABSOLUTE: 0.07 E9/L (ref 0.05–0.5)
EOSINOPHILS RELATIVE PERCENT: 1.7 % (ref 0–6)
GFR AFRICAN AMERICAN: >60
GFR NON-AFRICAN AMERICAN: 54 ML/MIN/1.73
GLUCOSE BLD-MCNC: 103 MG/DL (ref 74–99)
HCT VFR BLD CALC: 33.6 % (ref 37–54)
HEMOGLOBIN: 10.9 G/DL (ref 12.5–16.5)
LYMPHOCYTES ABSOLUTE: 0.42 E9/L (ref 1.5–4)
LYMPHOCYTES RELATIVE PERCENT: 10.4 % (ref 20–42)
MCH RBC QN AUTO: 29.9 PG (ref 26–35)
MCHC RBC AUTO-ENTMCNC: 32.4 % (ref 32–34.5)
MCV RBC AUTO: 92.3 FL (ref 80–99.9)
METAMYELOCYTES RELATIVE PERCENT: 0.9 % (ref 0–1)
MONOCYTES ABSOLUTE: 0.17 E9/L (ref 0.1–0.95)
MONOCYTES RELATIVE PERCENT: 3.5 % (ref 2–12)
NEUTROPHILS ABSOLUTE: 3.53 E9/L (ref 1.8–7.3)
NEUTROPHILS RELATIVE PERCENT: 83.5 % (ref 43–80)
PDW BLD-RTO: 13.8 FL (ref 11.5–15)
PLATELET # BLD: 114 E9/L (ref 130–450)
PMV BLD AUTO: 10.3 FL (ref 7–12)
POTASSIUM SERPL-SCNC: 4.2 MMOL/L (ref 3.5–5)
RBC # BLD: 3.64 E12/L (ref 3.8–5.8)
SODIUM BLD-SCNC: 141 MMOL/L (ref 132–146)
TOTAL PROTEIN: 7.6 G/DL (ref 6.4–8.3)
WBC # BLD: 4.2 E9/L (ref 4.5–11.5)

## 2022-07-02 PROCEDURE — 85025 COMPLETE CBC W/AUTO DIFF WBC: CPT

## 2022-07-02 PROCEDURE — 82378 CARCINOEMBRYONIC ANTIGEN: CPT

## 2022-07-02 PROCEDURE — 36415 COLL VENOUS BLD VENIPUNCTURE: CPT

## 2022-07-02 PROCEDURE — 80053 COMPREHEN METABOLIC PANEL: CPT

## 2022-07-05 ENCOUNTER — HOSPITAL ENCOUNTER (OUTPATIENT)
Dept: INFUSION THERAPY | Age: 72
Discharge: HOME | End: 2022-07-05
Payer: MEDICARE

## 2022-07-05 ENCOUNTER — OFFICE VISIT (OUTPATIENT)
Dept: ONCOLOGY | Age: 72
End: 2022-07-05
Payer: MEDICARE

## 2022-07-05 VITALS
DIASTOLIC BLOOD PRESSURE: 71 MMHG | RESPIRATION RATE: 20 BRPM | WEIGHT: 218.6 LBS | TEMPERATURE: 96.2 F | OXYGEN SATURATION: 99 % | SYSTOLIC BLOOD PRESSURE: 137 MMHG | HEART RATE: 64 BPM | BODY MASS INDEX: 28.84 KG/M2

## 2022-07-05 DIAGNOSIS — C20 RECTAL CANCER METASTASIZED TO LIVER (HCC): Primary | ICD-10-CM

## 2022-07-05 DIAGNOSIS — C20 RECTAL CANCER (HCC): Primary | ICD-10-CM

## 2022-07-05 DIAGNOSIS — C78.7 RECTAL CANCER METASTASIZED TO LIVER (HCC): Primary | ICD-10-CM

## 2022-07-05 PROCEDURE — 6360000002 HC RX W HCPCS: Performed by: INTERNAL MEDICINE

## 2022-07-05 PROCEDURE — 1123F ACP DISCUSS/DSCN MKR DOCD: CPT | Performed by: INTERNAL MEDICINE

## 2022-07-05 PROCEDURE — 99214 OFFICE O/P EST MOD 30 MIN: CPT

## 2022-07-05 PROCEDURE — 99214 OFFICE O/P EST MOD 30 MIN: CPT | Performed by: INTERNAL MEDICINE

## 2022-07-05 PROCEDURE — 96523 IRRIG DRUG DELIVERY DEVICE: CPT

## 2022-07-05 PROCEDURE — 2580000003 HC RX 258: Performed by: INTERNAL MEDICINE

## 2022-07-05 RX ORDER — SODIUM CHLORIDE 0.9 % (FLUSH) 0.9 %
10 SYRINGE (ML) INJECTION PRN
Status: CANCELLED | OUTPATIENT
Start: 2022-07-05

## 2022-07-05 RX ORDER — HEPARIN SODIUM (PORCINE) LOCK FLUSH IV SOLN 100 UNIT/ML 100 UNIT/ML
500 SOLUTION INTRAVENOUS PRN
Status: CANCELLED | OUTPATIENT
Start: 2022-07-05

## 2022-07-05 RX ORDER — SODIUM CHLORIDE 0.9 % (FLUSH) 0.9 %
10 SYRINGE (ML) INJECTION PRN
Status: DISCONTINUED | OUTPATIENT
Start: 2022-07-05 | End: 2022-07-06 | Stop reason: HOSPADM

## 2022-07-05 RX ORDER — HEPARIN SODIUM (PORCINE) LOCK FLUSH IV SOLN 100 UNIT/ML 100 UNIT/ML
500 SOLUTION INTRAVENOUS PRN
Status: DISCONTINUED | OUTPATIENT
Start: 2022-07-05 | End: 2022-07-06 | Stop reason: HOSPADM

## 2022-07-05 RX ADMIN — HEPARIN 500 UNITS: 100 SYRINGE at 08:27

## 2022-07-05 RX ADMIN — SODIUM CHLORIDE, PRESERVATIVE FREE 10 ML: 5 INJECTION INTRAVENOUS at 08:27

## 2022-07-05 NOTE — PROGRESS NOTES
[place] [id number]           Attending Clinic Note     Reason for Visit: Follow-up on a patient with Metastatic Rectal Cancer.     PCP: Yamini Turpin MD     History of Present Illness:  68 y/o  male who was referred to see Dr. Katarzyna Campbell (GI team) for evaluation of bright red blood per rectum, mild anemia and change in bowel habits with diarrhea. CEA 2640 on 10/19/2015. AlcP 299 AST 57 ALT 75 on 10/19/2015. Colonoscopy in 2013 noted no significant polyps, colitis or lesions at that time. Denies any Family History of colorectal cancer or polyps.     Colonoscopy on 10/19/2015 revealed:  1. Ascending polyp, 8 mm, hot snare: Tubulovillous adenoma. 2. Transverse polyp, 1 cm, hot snare: Serrated polyp most consistent with sessile serrated adenoma. 3. Five splenic flexure polyps, three - 5 mm, 7 mm, 8 mm, hot snare and biopsy: Four segments of Tubular Adenoma  4. Descending polyp, 5 mm, biopsy: Tubular adenoma. 5. Sigmoid polyp, 4 mm biopsy, Serrated polyp most consistent with sessile serrated adenoma. 6. Two rectal polyps, 4 mm biopsy: Serrated polyp most consistent with hyperplastic polyp. 7. Rectosigmoid colon mass (large mass approximately 65% circumference of the lumen; Very friable, firm and hard): Tubulovillous adenoma with associated focal erosion and fibroplasia.      CT scan abdomen/pelvis on 10/26/2015:  1. Small nodules at lung bases likely represent metastatic colon   cancer. 2. Extensive likely metastatic colon cancer throughout the liver.      3. Mild mural thickening and luminal narrowing in the   terminal ileum, otherwise nonspecific.      Colonoscopy with snare removal rectal mass was performed by Dr. Brandi Roper. Pathology proved:  Rectal polyp: Invasive adenocarcinoma involving villous adenoma and extending to the cauterized edge of excision. KRAS Mutation: Mutation detected. BRAF Mutation: Mutation not detected.   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     CEA 3488. Bone scan on 11/10/2015 noted no metastatic disease. CT chest on 11/10/2015 revealed Multiple pulmonary nodules in the upper and lower lobes consistent with metastatic disease;   Hepatic metastasis also visualized. 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 Mediport was placed. Cycle # 1 of FOLFOX + Avastin was on 11/23/2015. CEA was 4555 on 11/23/2015. Cycle # 2 of FOLFOX + Avastin was on 12/08/2015. CEA was 3160 on 12/08/2015. Cycle # 3 of FOLFOX + Avastin was on 12/22/2015. CEA was 2516 on 12/21/2015. Cycle # 4 of FOLFOX + Avastin was on 01/05/2016. CEA was 2098 on 01/05/2015. Cycle # 5 of FOLFOX + Avastin was on 01/19/2016. CEA was 1511 on 01/05/2015.     -Bone scan on 01/26/2016 noted no metastatic disease. CT chest on 01/26/2016 revealed Significant response to treatment with no visible residual nodules. CT scan abdomen/pelvis on 01/26/2016 noted Interval decreased size of multiple masses in the liver compatible with treatment response. Continue another 2 months of FOLFOX + Avastin and repeat scans. Cycle # 6 of FOLFOX + Avastin was on 02/02/2016.  on 02/02/2016. Cycle # 7 of FOLFOX + Avastin was on 02/16/2016.  on 02/16/2016. Cycle # 8 of FOLFOX + Avastin was on 03/01/2016.  on 03/01/2016. Cycle # 9 of FOLFOX + Avastin was on 03/15/2016.  on 03/15/2016. Cycle # 10 of FOLFOX + Avastin was on 03/29/2016. .4 on 03/29/2016. Cycle # 11 of FOLFOX + Avastin was on 04/12/2016. .4 on 04/12/2016.     Re-staging scans on 04/19/2016: CT Chest: clear lungs; no evidence of recurring pulmonary nodule; CT Abdomen/Pelvis: Further interval decrease in size of the multiple metastatic hepatic lesions, the largest lesion now measures 3.9 x 3.5 cm and previously measured 4.5 cm in maximum diameter.  No mesenteric lymphadenopathy is identified; Bone Scan: No evidence of osseous metastasis. Continue same regimen and re-stage in 2-3 months. Cycle # 12 FOLFOX + Avastin was on 04/26/2016. .5 on 04/26/2016. Cycle # 13 FOLFOX + Avastin was on 05/10/2016. .3 on 05/10/2016. Cycle # 14 FOLFOX+AVASTIN was on 05/24/2016. .5 on 05/24/2016. Cycle # 15 FOLFOX + Avastin was on 06/07/2016. CEA 90.5 on 06/07/2016. Admitted to [facility] 06/13/2016-06/16/2016 for abdominal pain: EGD noted 1.5 cm clean based duodenal bulb ulceration s/p epinephrine and bicap per Dr. Carmen Ordoñez. No active bleeding. A. Stomach, biopsy: Mild chronic gastritis, immunostain negative for Helicobacter  B. Esophagus, biopsy: Gastric glandular mucosa with prominent intestinal metaplasia ([place]'s epithelium), negative for epithelial dysplasia, esophageal squamous mucosa not identified  Cycle # 16 FOLFOX (discontinued avastin (bevacizumab) given association of peptic ulcer disease and known association of GI perforation) was on 06/21/2016. Cycle # 17 FOLFOX was on 07/05/2016. CEA 52.6 on 07/19/2016. Cycle # 17 FOLFOX was on 07/05/2016. CEA 52.6 on 07/05/2016. CEA 47.6 on 07/19/2016.     Re-staging scans 07/19/2106: CT Chest negative for metastatic disease. CT Abdomen/Pelvis: Stable hepatic lesions; Question new mural thickening in the cecum. Bone Scan: New Let hip lesion suspicious for bone metastasis.     Increased CEA; new mural thickening in the cecum and new left hip lesion suspicious for bone metastasis are consistent with disease progression; He derived maximum benefit from FOLFOX/AVASTIN. D/C FOLFOX/AVASTIN. We recommended FOLFIRI second line therapy. Cycle # 1 FOLFIRI was on 08/02/2016. CEA 40.8 on 08/02/2016. Xgeva q4 weeks started on 08/02/2016. Cycle # 2 FOLFIRI was on 08/16/2016. CEA 31.7 on 08/16/2016. Cycle # 3 FOLFIRI (added Avastin) was on 08/30/2016 given that ulcers healed on EGD 08/15/2016 by Dr. Lisandra Boles; Protonix bid since avastin re-started.    Colonoscopy on 08/29/2016 (to look at the cecal area) unremarkable. CEA 26.7 on 08/30/2016. Cycle # 4 FOLFIRI/Avastin was on 09/13/2016. CEA 23.4 on 09/13/2016. Cycle # 5 FOLFIRI/Avastin was on 09/27/2016. CEA 22.3 on 09/27/2016. Cycle # 6 FOLFIRI/Avastin was on 10/11/2016. CEA 18.4 on 10/11/2016.      Bone scan 10/21/2016 stable bone metastasis; ? New lesion anterior left sixth rib likely interval healing fracture. CT abdomen/pelvis revealed stable hepatic metastasis. CT chest negative for metastatic disease. Continue FOLFIRI/Avastin and repeat scans in 3 months. Cycle # 7 FOLFIRI/Avastin was on 10/25/2016. CEA 16.1  Cycle # 8 FOLFIRI/Avastin was on 11/08/2016. CEA 15.7  Cycle # 9 FOLFIRI/Avastin was on 11/29/2016. CEA 13.6 on 11/29/2016. Cycle # 10 FOLFIRI/Avastin was on 12/13/2016. CEA 10.6 on 12/13/2016. Cycle # 11 FOLFIRI/Avastin was on 12/27/2016. CEA 11.1 on 12/27/2016. Cycle # 12 FOLFIRI/Avastin was on 01/10/2017. CEA 9.7 on 01/10/2017.       CT chest 01/23/2017 No new pulmonary nodule or lymphadenopathy. Patchy groundglass opacities within the left lower lobe, suggestive of infectious or inflammatory etiology. Followup CT chest in 3 months. Slight increased linear sclerosis along the left anterior sixth rib corresponding to an area of increased uptake on the prior bone scan from 10/21/2016, favored to be related to interval healing changes of a nondisplaced fracture. CT abdomen/pelvis on 01/23/2017 Redemonstration of multiple hepatic metastases, which are similar compared to the prior CT from 10/21/2016. Redemonstration of area of increased sclerosis along the right acetabulum suspicious for metastatic disease. Bone scan on 01/23/2017 Stable subtle uptake within the left proximal diaphysis, again suggestive of metastatic disease  Decreased subtle uptake within the medial right acetabulum.    Decreased uptake within the left anterior sixth rib corresponding to linear sclerosis on the recent CT, favored to be related to an joint rib fracture. Continue FOLFIRI + Avastin and repeat scans in 3 months. Cycle # 13 FOLFIRI + Avastin was on 01/24/2017. Cycle # 14 FOLFIRI + Avastin was on 02/07/2017. Cycle # 15 FOLFIRI + Avastin was on 02/21/2017. Cycle # 16 FOLFIRI + Avastin was on 03/07/2017. Cycle # 17 FOLFIRI + Avastin was on 03/21/2017. Cycle # 18 FOLFIRI + Avastin was on 04/04/2017. CT chest 04/17/2017 negative for metastatic disease. CT abdomen/pelvis 04/17/2017 Stable hypodense metastatic lesions within the liver. Stable area of increased sclerosis along the right acetabulum  Bone scan 04/17/2017 Stable subtle uptake within the left proximal femoral diaphysis; No definite abnormal uptake in the region of a sclerotic lesion along the posterior column of the right acetabulum noted on CT. Stable slight uptake within the left anterior sixth rib corresponding to linear sclerosis on the recent CT, favored to be related to a fracture. Continue FOLFIRI + Avastin and repeat scans in 3 months. Cycle # 19 FOLFIRI + Avastin was on 04/18/2017. Cycle # 20 FOLFIRI + Avastin was on 05/02/2017. Cycle # 21 FOLFIRI + Avastin was on 05/16/2017. Cycle # 22 FOLFIRI + Avastin was on 05/30/2017. Cycle # 23 FOLFIRI + Avastin was on 06/13/2017. Cycle # 24 FOLFIRI + Avastin was on 06/27/2017. Cycle # 25 FOLFIRI + Avastin was on 07/11/2017. Cycle # 26 FOLFIRI + Avastin was on 07/25/2017. Cycle # 27 FOLFIRI + Avastin was on 08/08/2017. Cycle # 28 FOLFIRI + Avastin was on 08/22/2017. Cycle # 29 FOLFIRI + Avastin was on 09/05/2017. Cycle # 30 FOLFIRI + Avastin was on 09/19/2017. Bone scan 09/26/2017 stable. CT abdomen/pelvis on 09/26/2017 Stable hypodense mass in the liver. Stable sclerotic lesion in the acetabulum. CT chest 09/26/2017 negative for metastatic disease. Cycle # 31 FOLFIRI + Avastin was on 10/03/2017. CEA 7.4 on 10/03/2017. Cycle # 32 FOLFIRI + Avastin was on 10/17/2017. CEA 6.0 on 10/17/2017.   Cycle # 33 FOLFIRI + Avastin was on 10/31/2017. CEA 6.8 on 10/31/2017. Cycle # 34 FOLFIRI + Avastin was on 11/14/2017. CEA 7.2 on 11/14/2017. Cycle # 35 FOLFIRI + Avastin was on 11/28/2017. CEA 5.1 on 11/28/2017. Cycle # 36 FOLFIRI + Avastin was on 12/12/2017. CEA 6.1 on 12/12/2017. Bone scan 12/22/2017 noted no evidence of metastatic disease to the axial or appendicular skeleton. CT chest 12/22/2017 noted no evidence of metastatic disease. CT abdomen/pelvis 12/22/2017 noted stable hypodense lesions in the liver. Continue FOLFIRI + Avastin and repeat scans in 3 months. Cycle # 37 FOLFIRI + Avastin was on 12/27/2018. Cycle # 38 FOLFIRI + Avastin was on 01/09/2018. Cycle # 39 FOLFIRI + Avastin was on 01/23/2018. Cycle # 40 FOLFIRI + Avastin was on 02/06/2018. Cycle # 41 FOLFIRI + Avastin was on 02/20/2018. Cycle # 42 FOLFIRI + Avastin was on 03/06/2018. Cycle # 43 FOLFIRI + Avastin was on 03/20/2018. Bone scan on 03/26/2018 negative for metastatic disease. CT chest 03/26/2018 noted ? new 7 mm nodule in LUCY. CT abdomen/pelvis 03/26/2018 noted stable hypodense lesions in the liver. ? New small ascites in the abdomen. Patient wants to continue to monitor the lung finding and repeat scans in 2 months instead of changing the current regimen into oral Lonsurf. Cycle # 44 FOLFIRI + Avastin was on 04/03/2018. CEA 6.3 on 04/03/2018. Cycle # 45 FOLFIRI + Avastin was on 04/24/2018. CEA 5.3 on 04/24/2018. Cycle # 46 FOLFIRI + Avastin was on 05/08/2018. CEA 5.4 on 05/08/2018. Cycle # 47 FOLFIRI + Avastin was on 05/22/2018. CEA 6.1 on 05/22/2018. CT chest 05/31/2018 noted stable nodule in LUCY. No evidence of worsening malignancy. CT abdomen/pelvis on 05/31/2018 noted stable liver metastasis. No evidence of worsening malignancy. Continue FOLFIRI + Avastin and repeat scans in 3 months. Cycle # 48 FOLFIRI + Avastin was on 06/06/2018. Cycle # 49 FOLFIRI + Avastin was on 06/19/2018.    Cycle # 50 FOLFIRI + Avastin was on 07/03/2018. Cycle # 51 FOLFIRI + Avastin was on 07/24/2018. Cycle # 52 FOLFIRI + Avastin was on 08/14/2018. Cycle # 53 FOLFIRI + Avastin was on 08/28/2018. CT chest 09/06/2018 noted interval decrease in size of LUCY measuring up to 4 mm, suggestive of treatment response. No mediastinal or hilar LN  CT abdomen/pelvis 09/06/2018 Slight interval increase in size of a previously noted metastatic lesion within the right hepatic lobe. This may be related to differences in phase of enhancement compared to the most recent study from 5/31/2018, the lesion remains decreased in size compared to the more previous studies. No abdominal, retroperitoneal, or pelvic lymphadenopathy. Continue FOLFIRI + Avastin and repeat scans in 2 months. Cycle # 54 FOLFIRI + Avastin was on 09/11/2018. Cycle # 55 FOLFIRI + Avastin was on 09/25/2018. Cycle # 56 FOLFIRI + Avastin was on 10/09/2018. Cycle # 57 FOLFIRI + Avastin was on 10/23/2018. CT chest 11/02/2018 stable 3 mm nodule within LUCY. No new right and left lung nodule. No mediastinal or osseous lesion. CT abdomen/pelvis 11/02/2018 stable metastatic disease to liver. No new metastatic disease identified. No mesenteric or retroperitoneal LN. No gross colonic lesion identified. Continue FOLFIRI + Avastin and repeat scans in 3 months. Cycle # 58 FOLFIRI + Avastin was on 11/06/2018. CEA 7.6 on 11/05/2018. Cycle # 59 FOLFIRI + Avastin was on 11/27/2018. CEA 6.9 on 11/26/2018. Cycle # 60 FOLFIRI + Avastin was on 12/11/2018. CEA 6.7 on 12/11/2018. Cycle # 61 FOLFIRI + Avastin was on 01/08/2019. CEA 5.6 on 01/07/2019. Cycle # 62 FOLFIRI + Avastin was on 01/29/2019. CEA 4.6 on 01/28/2019. Cycle # 63 FOLFIRI + Avastin was on 02/12/2019. CEA 4.3 on 02/11/2019. CT chest 02/21/2019 no evidence of metastatic disease. CT abdomen/pelvis 02/21/2019 stable hypodense liver lesions. No evidence of worsening metastatic disease.  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 Ordered MRI thoracic spine and referred to neurosurgery team.    Cycle # 64 FOLFIRI + Avastin was on 02/26/2019. CEA 4.7 on 02/25/2019. Cycle # 65 FOLFIRI + Avastin was on 03/12/2019. CEA 4.0 on 03/11/2019. Cycle # 66 FOLFIRI + Avastin was on 03/26/2019. CEA 4.7 on 03/25/2019. Cycle # 67 FOLFIRI + Avastin was on 04/09/2019. CEA 5.6 on 04/08/2019. Cycle # 68 FOLFIRI + Avastin was on 04/30/2019. CEA 4.9 on 04/29/2019. Cycle # 69 FOLFIRI + Avastin was on 05/14/2019. CEA 5.3 on 05/14/2019. CT chest 05/23/2019 stable small lung nodule with no evidence of metastatic disease. CT abdomen/pelvis 05/23/2019 Decrease conspicuity of the liver lesions. No new lesions seen. Stable splenomegaly. Continue FOLFIRI + Avastin and repeat scans in 3 months. Cycle # 70 FOLFIRI + Avastin was on 06/04/2019. CEA 4.8 on 06/03/2019. Cycle # 71 FOLFIRI + Avastin was on 06/18/2019. CEA 4.6 on 06/17/2019. Cycle # 72 FOLFIRI + Avastin was on 07/09/2019. CEA 4.3 on 07/08/2019. Cycle # 73 FOLFIRI + Avastin was on 07/30/2019. CEA 5.0 on 07/29/2019. Cycle # 74 FOLFIRI + Avastin was on 08/13/2019. CEA 5.0 on 08/12/2019. CT chest 08/20/2019 negative  CT abdomen/pelvis 08/20/2019 Previous identified hepatic lesions are not visualized on the current exam.  Continue FOLFIRI + Avastin and repeat scans in 3 months. Cycle # 75 FOLFIRI + Avastin was on 08/27/2019. CEA 5.0 on 08/26/2019. Cycle # 76 FOLFIRI + Avastin was on 09/17/2019. CEA 5.5 on 09/16/2019. Cycle # 77 FOLFIRI + Avastin was on 10/15/2019. CEA 4.6 on 10/15/2019. Cycle # 78 FOLFIRI + Avastin was on 10/29/2019. CEA 4.1 on 10/28/2019. Cycle # 79 FOLFIRI + Avastin was on 11/12/2019. CEA 4.3 on 11/12/2019. Cycle # 80 FOLFIRI + Avastin was on 12/10/2019. CEA 4.0 on 12/09/2019.   CT chest 12/19/2019 Stable 3 mm nodule within the left upper lobe, similar to the previous studies dating back to 11/2/2018, however decreased in size compared to the CT from 3/26/2018. No thoracic LN. CT abdomen/pelvis 12/19/2019 Previously described small hypodense lesions are more conspicuous on the current study compared to the recent study throughout the liver from 8/20/2019, however similar to the prior study from 2/21/2019. Findings may be related to differences in contrast opacification. No abdominal or pelvic lymphadenopathy. Continue FOLFIRI + Avastin and repeat scans in 2 months to f/u on liver lesions. Cycle # 81 FOLFIRI + Avastin was on 01/07/2020. CEA 3.8 on 01/06/2020. Cycle # 82 FOLFIRI + Avastin was on 01/21/2020. CEA 3.7 on 01/20/2020. Cycle # 83 FOLFIRI + Avastin was on 02/04/2020. CEA 4.1 on 02/03/2020. Cycle # 84 FOLFIRI + Avastin was on 02/18/2020. CEA 4.6 on 02/17/2020. EGD by Dr. Ezequiel Nagel 03/02/2020 showing no masses or lesions  Cycle # 85 FOLFIRI + Avastin was on 03/03/2020. CEA 7.4 on 03/02/2020. Cycle # 86 FOLFIRI + Avastin was on 03/17/2020. CEA 4.5 on 03/16/2020. Colonoscopy on 03/23/2020 by Dr. Ezequiel Nagel unremarkable (records requested). Cycle # 87 FOLFIRI + Avastin was on 03/31/2020. CEA 4.1 on 03/30/2020. Cycle # 88 FOLFIRI + Avastin was on 04/21/2020. CEA 6.4 on 04/20/2020. Cycle # 89 FOLFIRI + Avastin was on 05/05/2020. CEA 5.8 on 05/04/2020. Cycle # 90 FOLFIRI + Avastin was on 06/02/2020. CEA 5.5 on 06/01/2020. Cycle # 91 FOLFIRI + Avastin was on 06/16/2020. CEA 5.6 on 06/15/2020. CT chest 06/23/2020 noted no metastatic disease in the chest.   CT abdomen/pelvis 06/23/2020 Stable small liver lesions measuring up to 5 mm since 2019.   22 mm round mass in segment 8 of the liver with central high density stable from December 2019), previously measuring up to 34 mm in 2017. No additional metastatic disease in the abdomen or pelvis. Small amount of ascites. Overall stable disease. Continue FOLFIRI + Avastin and repeat scans in 2-3 months. Cycle # 92 FOLFIRI + Avastin was on 07/07/2020. CEA 5.7 on 07/06/2020. Cycle # 93 FOLFIRI + Avastin was on 07/21/2020. CEA 5.9 on 07/20/2020. Cycle # 94 FOLFIRI + Avastin was on 08/04/2020. CEA 5.5 on 08/04/2020. Cycle # 95 FOLFIRI + Avastin was on 08/25/2020. CEA 6.1 on 08/24/2020. CT chest 9/2/2020 stable unremarkable enhanced CT of the thorax. There is no metastatic disease to the lungs. CT abdomen pelvis on 9/2/2020 stable 2.2 cm low attenuated lesion within the central aspect of the right hepatic lobe favored to represent treated metastatic disease. No new hepatic lesion is identified. Stable splenomegaly  There is no appreciable colonic lesion or stricture  Pericholecystic fluid of uncertain etiology. General surgery team consulted. Laparoscopic cholecystectomy with normal cholangiogram 10/27/20  Gallbladder: Chronic cholecystitis and cholelithiasis.  Unremarkable regional lymph node. 11/13/2020; Seen by Dr. Geradr Brink from General surgery team; cleared to re-start chemotherapy. Cycle # 96 FOLFIRI + Avastin was on 11/17/2020. CEA 3.6 on 11/16/2020. Cycle # 97 FOLFIRI + Avastin was on 12/01/2020. CEA 3.5 on 11/30/2020. Cycle # 98 FOLFIRI + Avastin was on 12/15/2020. CEA 4.3 on 12/15/2020. Cycle # 99 FOLFIRI + Avastin was on 01/05/2021. CEA 4.7 on 01/04/2021. CT chest 01/13/2021 negative for metastatic disease. CT abdomen/pelvis 01/13/2021 Unchanged central hepatic lesion. No specific signs of abdominopelvic metastatic disease. Continue FOLFIRI + Avastin and repeat scans in 3 months. Cycle # 100 FOLFIRI + Avastin was on 01/19/2021. CEA 4.7 on 01/18/2021. Cycle # 101 FOLFIRI + Avastin was on 02/02/2021. CEA 5.2 on 02/01/2021. Cycle # 102 FOLFIRI + Avastin was on 02/16/2021. CEA 5.6 on 02/15/2021. Cycle # 103 FOLFIRI + Avastin was on 03/02/2021. Cycle # 104 FOLFIRI (20% dose reduced due to significant toxicity) + Avastin was on 03/16/2021. Cycle # 105 FOLFIRI (same dose as cycle # 104) + Avastin was on 03/30/2021. CEA 6.5 on 03/29/2021.   Cycle # 106 FOLFIRI (same dose as cycle # 104) + Avastin was on 04/13/2021. CEA 6.0 on 04/12/2021  CT chest 04/20/2021 no evidence of metastatic disease. CT abdomen/pelvis 04/20/2021 No evidence of progressive metastatic disease. Stable 2 cm lesion in the right lobe of the liver, likely representing treated metastatic disease. Imaging reviewed. Continue FOLFIRI + Avastin and repeat scans in 3 months  Cycle # 107 FOLFIRI + Avastin was on 04/27/2021  Cycle # 108 FOLFIRI + Avastin was on 05/11/2021. Cycle # 109 FOLFIRI (held Avastin due to upcoming surgery) on 05/25/2021. CEA 6.3 on 5/24/21  Cycle # 110 FOLFIRI (held Avastin due to upcoming surgery) on 06/08/2021. CEA 6.3 on 6/07/21. Right inguinal/scrotol hernia repair on 07/13/2021 with Dr Tomas Franklin with folely removal on Friday 07/23/2021. He developed fever on 07/24/2021 and was brought to [address] [place] [address],[address] ER via EMS; Sepsis secondary to urinary tract infection   Completed Abx  CEA 4.1 on 08/30/2021  Cycle # 111 FOLFIRI was on 08/31/2021. HBP team for evaluation of liver lesions; His lesion does appear to be a hemangioma as his prior metastatic deposits in his liver were hypo attenuating compared to this CT which is hyper attenuating. compared to his prior CT scans he has disappearing liver metastasis. MRI liver on 09/17/2021 No suspicious liver lesion identified. Colonoscopy GI Dr. Natalie Thomas on 09/20/2021 unremarkable  CEA 3.3 on 09/27/2021. CEA 3.2 on 10/25/2021. CEA 2.7 on 11/22/2021. CEA 2.5 on 12/27/2021. CEA 2.4 on 01/24/2022. CEA 2.3 on 02/21/2022. CEA 2.4 on 03/21/2022. CEA 2.2 on 04/18/2022  CEA 2.2 on 05/16/2022  CEA 2.3 on 06/06/2022  HBP note 06/14/2022 reviewed. No evidence of recurrence on repeat imaging. CEA 2.4 on 07/02/2022  Today 07/05/2022 for f/u. No fever, chills. No nausea/vomiting. Fair appetite and energy level. + arthritis will take tylenol arthritis    Review of Systems;  CONSTITUTIONAL: No fever, chills.  Fair appetite and energy level  ENMT: Eyes: No diplopia; Nose: No epistaxis. Mouth:No lesions  RESPIRATORY: No hemoptysis, shortness of breath. CARDIOVASCULAR: No chest pain, palpitations. GASTROINTESTINAL:No N/V, abdominal pain. GENITOURINARY: No dysuria, urinary frequency, hematuria. MSK: + Arthritis   NEURO: No syncope, presyncope, headache. Remainder ROS: Negative. Past Medical History:   Past Medical History               Diagnosis Date    Arthritis      Cancer ([address]         colon    Depression      Hyperlipidemia      Hypertension           Medications:  Reviewed and reconciled.     Allergies: Allergies   Allergen Reactions    Neosporin [Neomycin-Polymyxin-Gramicidin] Rash    Tape [facility] Tape] Rash      Physical Exam:  /71   Pulse 64   Temp (!) 96.2 °F (35.7 °C) (Temporal)   Resp 20   Wt 218 lb 9.6 oz (99.2 kg)   SpO2 99%   BMI 28.84 kg/m²   GENERAL: Alert, oriented x 3, not in distress  HEENT: PERRLA, EOMI. NECK: Supple. Without lymphadenopathy. LUNGS: CTA bilaterally, with no wheezing, crackles or rhonchi. CARDIOVASCULAR: Regular rhythm. No murmurs, rubs or gallops. ABDOMEN: Soft. Non-tender, non-distended. EXTREMITIES: Without clubbing, cyanosis  NEUROLOGIC: No focal deficits.    ECOG PS 1    Diagnostics:  Lab Results   Component Value Date    WBC 4.2 (L) 07/02/2022    HGB 10.9 (L) 07/02/2022    HCT 33.6 (L) 07/02/2022    MCV 92.3 07/02/2022     (L) 07/02/2022     Lab Results   Component Value Date     07/02/2022    K 4.2 07/02/2022     07/02/2022    CO2 26 07/02/2022    BUN 21 07/02/2022    CREATININE 1.3 (H) 07/02/2022    GLUCOSE 103 (H) 07/02/2022    CALCIUM 9.9 07/02/2022    PROT 7.6 07/02/2022    LABALBU 4.1 07/02/2022    BILITOT 0.5 07/02/2022    ALKPHOS 125 07/02/2022    AST 21 07/02/2022    ALT 12 07/02/2022    LABGLOM 54 07/02/2022    GFRAA >60 07/02/2022     Lab Results   Component Value Date    CEA 2.4 07/02/2022     Impression/Plan:  66 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 KRAS Mutation: Mutation detected. BRAF Mutation: Mutation not detected. 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 CT scan abdomen/pelvis on 10/26/2015 revealed small nodules at the lung bases, extensive liver lesions consistent with metastatic rectosigmoid cancer. CEA 3488 on 10/30/2015. Bone scan on 11/10/2015 noted no metastatic disease. CT chest on 11/10/2015 revealed Multiple pulmonary nodules in the upper and lower lobes consistent with metastatic disease; Hepatic metastasis also visualized. 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 Mediport was placed. Cycle # 1 of FOLFOX + Avastin was on 11/23/2015. CEA was 4555 on 11/23/2015. Cycle # 2 of FOLFOX + Avastin was on 12/08/2015. CEA was 3160 on 12/08/2015. Cycle # 3 of FOLFOX + Avastin was on 12/22/2015. CEA was 2516 on 12/21/2015. Cycle # 4 of FOLFOX + Avastin was on 01/05/2016. CEA was 2098 on 01/05/2015. Cycle # 5 of FOLFOX + Avastin was on 01/19/2016. CEA was 1511 on 01/05/2015.     -Bone scan on 01/26/2016 noted no metastatic disease. CT chest on 01/26/2016 revealed Significant response to treatment with no visible residual nodules. CT scan abdomen/pelvis on 01/26/2016 noted Interval decreased size of multiple masses in the liver compatible with treatment response. Continue another 2 months of FOLFOX + Avastin and repeat scans. Cycle # 6 of FOLFOX + Avastin was on 02/02/2016.  on 02/02/2016. Cycle # 7 of FOLFOX + Avastin was on 02/16/2016.  on 02/16/2016. Cycle # 8 of FOLFOX + Avastin was on 03/01/2016.  on 03/01/2016. Cycle # 9 of FOLFOX + Avastin was on 03/15/2016.  on 03/15/2016. Cycle # 10 of FOLFOX + Avastin was on 03/29/2016. .4 on 03/29/2016. Cycle # 11 of FOLFOX + Avastin was on 04/12/2016.  .4 on 04/12/2016.     Re-staging scans on 04/19/2016: CT Chest: clear lungs; no evidence of recurring pulmonary nodule; CT Abdomen/Pelvis: Further interval decrease in size of the multiple metastatic hepatic lesions, the largest lesion now measures 3.9 x 3.5 cm and previously measured 4.5 cm in maximum diameter. No mesenteric lymphadenopathy is identified; Bone Scan: No evidence of osseous metastasis. Continue same regimen and re-stage in 2-3 months. Cycle # 12 FOLFOX + Avastin was on 04/26/2016. .5 on 04/26/2016. Cycle # 13 FOLFOX + Avastin was on 05/10/2016. .3 on 05/10/2016. Cycle # 14 FOLFOX+AVASTIN was on 05/24/2016. .5 on 05/24/2016. Cycle # 15 FOLFOX + Avastin was on 06/07/2016. CEA 90.5 on 06/07/2016. Admitted to [facility] 06/13/2016-06/16/2016 for abdominal pain: EGD noted 1.5 cm clean based duodenal bulb ulceration s/p epinephrine and bicap per Dr. Jannet He. No active bleeding. A. Stomach, biopsy: Mild chronic gastritis, immunostain negative for Helicobacter  B. Esophagus, biopsy: Gastric glandular mucosa with prominent ntestinal metaplasia ([place]'s epithelium), negative for epithelial dysplasia, esophageal squamous mucosa not identified     Cycle # 16 FOLFOX (discontinued avastin (bevacizumab) given association of peptic ulcer disease and known association of GI perforation.) was on 06/21/2016. CEA 47 on 06/21/2016. Cycle # 17 FOLFOX was on 07/05/2016. CEA 52.6 on 07/05/2016. CEA 47.6 on 07/19/2016.     Re-staging scans 07/19/2106: CT Chest negative for metastatic disease. CT Abdomen/Pelvis: Stable hepatic lesions; Question new mural thickening in the cecum. Bone Scan: New Let hip lesion suspicious for bone metastasis.     Increased CEA; new mural thickening in the cecum and new left hip lesion suspicious for bone metastasis are consistent with disease progression; He derived maximum benefit from FOLFOX/AVASTIN. D/C FOLFOX/AVASTIN. We recommended FOLFIRI second line therapy. Cycle # 1 FOLFIRI was on 08/02/2016. CEA 40.8 on 08/02/2016.  Xgeva q4 weeks started on 08/02/2016. Cycle # 2 FOLFIRI was on 08/16/2016. CEA 31.7 on 08/16/2016. Cycle # 3 FOLFIRI (added Avastin) was on 08/30/2016 given that ulcers healed on EGD 08/15/2016 by Dr. Lewis Tong; Protonix bid since avastin re-started. Colonoscopy on 08/29/2016 (to look at the cecal area) unremarkable. CEA 26.7 on 08/30/2016. Cycle # 4 FOLFIRI/Avastin was on 09/13/2016. CEA 23.4 on 09/13/2016. Cycle # 5 FOLFIRI/Avastin was on 09/27/2016. CEA 22.3 on 09/27/2016. Cycle # 6 FOLFIRI/Avastin was on 10/11/2016. CEA 18.4 on 10/11/2016.      Bone scan 10/21/2016 stable bone metastasis; ? New lesion anterior left sixth rib likely interval healing fracture. CT abdomen/pelvis revealed stable hepatic metastasis. CT chest negative for metastatic disease. Continue FOLFIRI/Avastin and repeat scans in 3 months. Cycle # 7 FOLFIRI/Avastin was on 10/25/2016. CEA 16.1 on 10/25/2016. Cycle # 8 FOLFIRI/Avastin was on 11/08/2016. CEA 15.7 on 11/08/2016. Cycle # 9 FOLFIRI/Avastin was on 11/29/2016. CEA 13.6 on 11/29/2016. Cycle # 10 FOLFIRI/Avastin was on 12/13/2016. CEA 10.6 on 12/13/2016. Cycle # 11 FOLFIRI/Avastin was on 12/27/2016. CEA 11.1 on 12/27/2016. Cycle # 12 FOLFIRI/Avastin was on 01/10/2017. CEA 9.7 on 01/10/2017. CT chest 01/23/2017 No new pulmonary nodule or lymphadenopathy. Patchy groundglass opacities within the left lower lobe, suggestive of infectious or inflammatory etiology. Followup CT chest in 3 months. Slight increased linear sclerosis along the left anterior sixth rib corresponding to an area of increased uptake on the prior bone scan from 10/21/2016, favored to be related to interval healing changes of a nondisplaced fracture. CT abdomen/pelvis on 01/23/2017 Redemonstration of multiple hepatic metastases, which are similar compared to the prior CT from 10/21/2016. Redemonstration of area of increased sclerosis along the right acetabulum suspicious for metastatic disease.    Bone scan on 01/23/2017 Stable subtle uptake within the left proximal diaphysis, again suggestive of metastatic disease  Decreased subtle uptake within the medial right acetabulum. Decreased uptake within the left anterior sixth rib corresponding to linear sclerosis on the recent CT, favored to be related to an joint rib fracture. Continue FOLFIRI + Avastin and repeat scans in 3 months. Cycle # 13 FOLFIRI + Avastin was on 01/24/2017. Cycle # 14 FOLFIRI + Avastin was on 02/07/2017. Cycle # 15 FOLFIRI + Avastin was on 02/21/2017. Cycle # 16 FOLFIRI + Avastin was on 03/07/2017. Cycle # 17 FOLFIRI + Avastin was on 03/21/2017. Cycle # 18 FOLFIRI + Avastin was on 04/04/2017. CT chest 04/17/2017 negative for metastatic disease. CT abdomen/pelvis 04/17/2017 Stable hypodense metastatic lesions within the liver. Stable area of increased sclerosis along the right acetabulum  Bone scan 04/17/2017 Stable subtle uptake within the left proximal femoral diaphysis; No definite abnormal uptake in the region of a sclerotic lesion along the posterior column of the right acetabulum noted on CT. Stable slight uptake within the left anterior sixth rib corresponding to linear sclerosis on the recent CT, favored to be related to a fracture. Continue FOLFIRI + Avastin and repeat scans in 3 months. Cycle # 19 FOLFIRI + Avastin was on 04/18/2017. CEA 7.5 on 04/18/2017. Cycle # 20 FOLFIRI + Avastin was on 05/02/2017. CEA 7.7 on 05/02/2017. Cycle # 21 FOLFIRI + Avastin was on 05/16/2017. CEA 7.1 on 05/16/2017. Cycle # 22 FOLFIRI + Avastin was on 05/30/2017. CEA 8.2 on 05/30/2017. Cycle # 23 FOLFIRI + Avastin was on 06/13/2017. CEA 7.7 on 06/13/2017. Cycle # 24 FOLFIRI + Avastin was on 06/27/2017. CEA 6.6 on 06/27/2017.    Re-staging scans 07/05/2017:  Bone scan stable proximal left femoral diaphysis, right acetabulum, and left anterior sixth rib lesions;  CT abdomen/pelvis stable hypodense metastatic lesions within the 03/26/2018 negative for metastatic disease. CT chest 03/26/2018 noted ? new 7 mm nodule in LUCY. CT abdomen/pelvis 03/26/2018 noted stable hypodense lesions in the liver. ? New small ascites in the abdomen. Patient wants to continue to monitor the lung finding and repeat scans in 2 months instead of changing the current chemotherapy regimen to oral Lonsurf. Cycle # 44 FOLFIRI + Avastin was on 04/03/2018. CEA 6.3 on 04/03/2018. Cycle # 45 FOLFIRI + Avastin was on 04/24/2018. CEA 5.3 on 04/24/2018. Cycle # 46 FOLFIRI + Avastin was on 05/08/2018. CEA 5.4 on 05/08/2018. Cycle # 47 FOLFIRI + Avastin was on 05/22/2018. CEA 6.1 on 05/22/2018. CT chest 05/31/2018 noted stable nodule in LUCY. No evidence of worsening malignancy. CT abdomen/pelvis on 05/31/2018 noted stable liver metastasis. No evidence of worsening malignancy. Continue FOLFIRI + Avastin and repeat scans in 3 months. Cycle # 48 FOLFIRI + Avastin was on 06/06/2018. CEA 6.3 on 06/05/2018. Cycle # 49 FOLFIRI + Avastin was on 06/19/2018. CEA 6.1 on 06/19/2018. Cycle # 50 FOLFIRI + Avastin was on 07/03/2018. CEA 7.4 on 07/03/2018. Cycle # 51 FOLFIRI + Avastin was on 07/24/2018. CEA 6.7 on 07/24/2018. Cycle # 52 FOLFIRI + Avastin was on 08/14/2018. CEA 6.8 on 08/14/2018. Cycle # 53 FOLFIRI + Avastin was on 08/28/2018. CEA 6.5 on 08/27/2018. CT chest 09/06/2018 noted interval decrease in size of LUCY measuring up to 4 mm, suggestive of treatment response. No mediastinal or hilar LN  CT abdomen/pelvis 09/06/2018 Slight interval increase in size of a previously noted metastatic lesion within the right hepatic lobe. This may be related to differences in phase of enhancement compared to the most recent study from 5/31/2018, the lesion remains decreased in size compared to the more previous studies. No abdominal, retroperitoneal, or pelvic lymphadenopathy. Continue FOLFIRI + Avastin and repeat scans in 2 months.    Cycle # 54 FOLFIRI + Avastin was on 09/11/2018. CEA 6.4 on 09/10/2018. Cycle # 55 FOLFIRI + Avastin was on 09/25/2018. CEA 6.4 on 09/25/2018. Cycle # 56 FOLFIRI + Avastin was on 10/09/2018. CEA 6.7 on 10/08/2018. Cycle # 57 FOLFIRI + Avastin was on 10/23/2018. CEA 7.2 on 10/22/2018. CT chest 11/02/2018 stable 3 mm nodule within LUCY. No new right and left lung nodule. No mediastinal or osseous lesion. CT abdomen/pelvis 11/02/2018 stable metastatic disease to liver. No new metastatic disease identified. No mesenteric or retroperitoneal LN. No gross colonic lesion identified. Continue FOLFIRI + Avastin and repeat scans in 3 months. Cycle # 58 FOLFIRI + Avastin was on 11/06/2018. CEA 7.6 on 11/05/2018. Cycle # 59 FOLFIRI + Avastin was on 11/27/2018. CEA 6.9 on 11/26/2018. Cycle # 60 FOLFIRI + Avastin was on 12/11/2018. CEA 6.7 on 12/11/2018. Cycle # 61 FOLFIRI + Avastin was on 01/08/2019. CEA 5.6 on 01/07/2019. Cycle # 62 FOLFIRI + Avastin was on 01/29/2019. CEA 4.6 on 01/28/2019. Cycle # 63 FOLFIRI + Avastin was on 02/12/2019. CEA 4.3 on 02/11/2019. CT chest 02/21/2019 no evidence of metastatic disease. CT abdomen/pelvis 02/21/2019 stable hypodense liver lesions. No evidence of worsening metastatic disease. 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 Ordered MRI thoracic spine and referred to neurosurgery team.  Cycle # 64 FOLFIRI + Avastin was on 02/26/2019. CEA 4.7 on 02/25/2019. Cycle # 65 FOLFIRI + Avastin was on 03/12/2019. CEA 4.0 on 03/11/2019. Cycle # 66 FOLFIRI + Avastin was on 03/26/2019. CEA 4.7 on 03/25/2019. Cycle # 67 FOLFIRI + Avastin was on 04/09/2019. CEA 5.6 on 04/08/2019. Cycle # 68 FOLFIRI + Avastin was on 04/30/2019. CEA 4.9 on 04/29/2019. Cycle # 69 FOLFIRI + Avastin was on 05/14/2019. CEA 5.3 on 05/14/2019. CT chest 05/23/2019 stable small lung nodule with no evidence of metastatic disease. CT abdomen/pelvis 05/23/2019 Decrease conspicuity of the liver lesions.  No FOLFIRI + Avastin was on 02/18/2020. CEA 4.6 on 02/17/2020. CT chest 2/28/2020 no evidence of metastatic disease to the lungs and no mediastinal or hilar adenopathy. CT abdomen pelvis 2/28/2020 no enhancing lesions seen within the liver to suggest metastatic disease. Wall thickening of the rectosigmoid junction. Images reviewed. Continue FOLFIRI Avastin and repeat scans in 3 months  EGD by Dr. Lex Vallejo 03/02/2020 showing no masses or lesions. Cycle # 85 FOLFIRI + Avastin was on 03/03/2020. CEA 7.4 on 03/02/2020. Cycle # 86 FOLFIRI + Avastin was on 03/17/2020. CEA 4.5 on 03/16/2020. Colonoscopy on 03/23/2020 by Dr. Lex aguero (records requested). Cycle # 87 FOLFIRI + Avastin was on 03/31/2020. CEA 4.1 on 03/30/2020. Cycle # 88 FOLFIRI + Avastin was on 04/21/2020. CEA 6.4 on 04/20/2020. Cycle # 89 FOLFIRI + Avastin was on 05/05/2020. CEA 5.8 on 05/04/2020. Cycle # 90 FOLFIRI + Avastin was on 06/02/2020. CEA 5.5 on 06/01/2020. Cycle # 91 FOLFIRI + Avastin was on 06/16/2020. CEA 5.6 on 06/15/2020. CT chest 06/23/2020 noted no metastatic disease in the chest.   CT abdomen/pelvis 06/23/2020 Stable small liver lesions measuring up to 5 mm since 2019.   22 mm round mass in segment 8 of the liver with central high density stable from December 2019), previously measuring up to 34 mm in 2017. No additional metastatic disease in the abdomen or pelvis. Small amount of ascites. Overall stable disease. Continue FOLFIRI + Avastin and repeat scans in 2-3 months. Cycle # 92 FOLFIRI + Avastin was on 07/07/2020. CEA 5.7 on 07/06/2020. Cycle # 93 FOLFIRI + Avastin was on 07/21/2020. CEA 5.9 on 07/20/2020. Cycle # 94 FOLFIRI + Avastin was on 08/04/2020. CEA 5.5 on 08/04/2020. Cycle # 95 FOLFIRI + Avastin was on 08/25/2020. CEA 6.1 on 08/24/2020. CT chest 9/2/2020 stable unremarkable enhanced CT of the thorax. There is no metastatic disease to the lungs.   CT abdomen pelvis on 9/2/2020 stable 2.2 cm low attenuated lesion within the central aspect of the right hepatic lobe favored to represent treated metastatic disease. No new hepatic lesion is identified. Stable splenomegaly  There is no appreciable colonic lesion or stricture. Pericholecystic fluid of uncertain etiology. Laparoscopic cholecystectomy with normal cholangiogram 10/27/20  Gallbladder: Chronic cholecystitis and cholelithiasis.  Unremarkable regional lymph node. 11/13/2020; Seen by Dr. Nelda Farooq from General surgery team; cleared to re-start chemotherapy. Cycle # 96 FOLFIRI + Avastin was on 11/17/2020. CEA 3.6 on 11/16/2020. Cycle # 97 FOLFIRI + Avastin was on 12/01/2020. CEA 3.5 on 11/30/2020. Cycle # 98 FOLFIRI + Avastin was on 12/15/2020. CEA 4.3 on 12/15/2020. Cycle # 99 FOLFIRI + Avastin was on 01/05/2021. CEA 4.7 on 01/04/2021. CT chest 01/13/2021 negative for metastatic disease. CT abdomen/pelvis 01/13/2021 Unchanged central hepatic lesion. No specific signs of abdominopelvic metastatic disease. Continue FOLFIRI + Avastin and repeat scans in 3 months. Cycle # 100 FOLFIRI + Avastin was on 01/19/2021. CEA 4.7 on 01/18/2021. Cycle # 101 FOLFIRI + Avastin was on 02/02/2021. CEA 5.2 on 02/01/2021. Cycle # 102 FOLFIRI + Avastin was on 02/16/2021. CEA 5.6 on 02/15/2021. Cycle # 103 FOLFIRI + Avastin was on 03/02/2021. Cycle # 104 FOLFIRI (20% dose reduced due to significant toxicity) + Avastin was on 03/16/2021. Cycle # 105 FOLFIRI (same dose as cycle # 104) + Avastin was on 03/30/2021. CEA 6.5 on 03/29/2021. Cycle # 106 FOLFIRI (same dose as cycle # 104) + Avastin was on 04/13/2021. CEA 6.0 on 04/12/2021  CT chest 04/20/2021 no evidence of metastatic disease. CT abdomen/pelvis 04/20/2021 No evidence of progressive metastatic disease. Stable 2 cm lesion in the right lobe of the liver, likely representing treated metastatic disease. Imaging reviewed.  Continue FOLFIRI + Avastin and repeat scans in 3 months  Cycle # 107 FOLFIRI + Avastin was on 04/27/2021  Cycle # 108 FOLFIRI + Avastin was on 05/11/2021. CEA 6.4 on 05/10/2021. Cycle # 109 FOLFIRI (held Avastin due to upcoming surgery) on 05/25/2021. CEA 6.3 on 5/24/21  Cycle # 110 FOLFIRI (held Avastin due to upcoming surgery) on 06/08/2021. CEA 6.3 on 6/07/21. CEA 5.8 on 06/28/2021. Right inguinal/scrotol hernia repair on 07/13/2021 with Dr Fuentes Neighbor with folely removal on 07/23/2021. He developed fever on 07/24/2021 and was brought to [address] [place], [place] [address] ER via EMS; Sepsis secondary to urinary tract infection; Completed Abx  CT chest 08/13/2021 no sign of recurrent or metastatic disease. CT abdomen/pelvis 08/13/2021 unchanged hepatic lesions. Splenomegaly and secondary signs of portal venous hypertension  CEA 4.0 on 08/16/2021  CEA 4.1 on 08/30/2021  Cycle # 111 FOLFIRI was on 08/31/2021. HBP team for evaluation of liver lesions; His lesion does appear to be a hemangioma as his prior metastatic deposits in his liver were hypo attenuating compared to this CT which is hyper attenuating. compared to his prior CT scans he has disappearing liver metastasis. MRI liver on 09/17/2021 No suspicious liver lesion identified. Colonoscopy GI Dr. Lita Howe on 09/20/2021 unremarkable  CEA 3.3 on 09/27/2021. CEA 3.2 on 10/25/2021. CT chest 11/08/2021: Similar chronic appearing findings.  No acute process or evidence of metastatic disease identified. CT Abdomen/pelvis 11/08/2021: No significant change in the appearance of the liver. Similar appearance of splenomegaly. No interval change. CEA 2.7 on 11/22/2021. CEA 2.5 on 12/27/2021  CEA 2.4 on 01/24/2022  CT chest/abdomen/pelvis 02/04/2022 Stable CT of the chest abdomen/ pelvis with the a 1.4 cm enhancing nodule in the right hepatic lobe which is marginally improved. Imaging reviewed.   CEA 2.3 on 02/21/2022  CEA 2.4 on 03/21/2022  CEA 2.2 on 04/18/2022  CT chest abdomen pelvis 5/11/2022: progressive hepatic metastasis with increasing number and prominence of the hepatic lesions  Soft tissue density in the ileocecal valve probably fecal matter. CEA 2.2 on 05/16/2022  CEA 2.3 on 06/06/2022  PET/CT scan 06/06/2022 the recently described hepatic lesions on CT are not significantly hypermetabolic on PET relative to hepatic background activity. No generalized pattern of FDG avid metastatic disease  Imaging reviewed. Labs reviewed. HBP noted on 06/14/2022 reviewed; No evidence of any recurrence on repeat imaging. Evaluated by Dr. Lisandra Boles on 06/20/2022; scope scheduled on 07/11/2022  CEA 2.4 on 07/02/2022    RTC 1 month with labs. MSI testing noted no mismatch repair protein loss of expression  NGS testing ([employer])   MS-Stable: No therapies or clinical trials  TMB 3Muts/Mb: No therapies or clinical trials  KRAS [id number]: Therapies with clinical relevance in patient's tumor type: Cetuximab, Panitumumab  Therapies with clinical relevance in other tumor type: None  NRAS wild type: Therapies with clinical relevance in patient's tumor type: Cetuximab, Panitumumab  Therapies with clinical relevance in other tumor type: None  APC: None None  MARIELA: None None  Pathology reviewed.     7/5/2022  Kevin Orourke MD  Board Certified Medical Oncologist

## 2022-08-01 ENCOUNTER — HOSPITAL ENCOUNTER (OUTPATIENT)
Age: 72
Discharge: HOME | End: 2022-08-01
Payer: MEDICARE

## 2022-08-01 DIAGNOSIS — C78.7 RECTAL CANCER METASTASIZED TO LIVER (HCC): ICD-10-CM

## 2022-08-01 DIAGNOSIS — C20 RECTAL CANCER METASTASIZED TO LIVER (HCC): ICD-10-CM

## 2022-08-01 LAB
ALBUMIN SERPL-MCNC: 4.4 G/DL (ref 3.5–5.2)
ALP BLD-CCNC: 144 U/L (ref 40–129)
ALT SERPL-CCNC: 13 U/L (ref 0–40)
ANION GAP SERPL CALCULATED.3IONS-SCNC: 10 MMOL/L (ref 7–16)
AST SERPL-CCNC: 21 U/L (ref 0–39)
BASOPHILS ABSOLUTE: 0 E9/L (ref 0–0.2)
BASOPHILS RELATIVE PERCENT: 0.5 % (ref 0–2)
BILIRUB SERPL-MCNC: 0.5 MG/DL (ref 0–1.2)
BUN BLDV-MCNC: 24 MG/DL (ref 6–23)
BURR CELLS: ABNORMAL
CALCIUM SERPL-MCNC: 9.9 MG/DL (ref 8.6–10.2)
CEA: 2.3 NG/ML (ref 0–5.2)
CHLORIDE BLD-SCNC: 104 MMOL/L (ref 98–107)
CO2: 26 MMOL/L (ref 22–29)
CREAT SERPL-MCNC: 1.4 MG/DL (ref 0.7–1.2)
EOSINOPHILS ABSOLUTE: 0.18 E9/L (ref 0.05–0.5)
EOSINOPHILS RELATIVE PERCENT: 4.4 % (ref 0–6)
GFR AFRICAN AMERICAN: >60
GFR NON-AFRICAN AMERICAN: 50 ML/MIN/1.73
GLUCOSE BLD-MCNC: 142 MG/DL (ref 74–99)
HCT VFR BLD CALC: 34.7 % (ref 37–54)
HEMOGLOBIN: 11.2 G/DL (ref 12.5–16.5)
LYMPHOCYTES ABSOLUTE: 0.48 E9/L (ref 1.5–4)
LYMPHOCYTES RELATIVE PERCENT: 11.5 % (ref 20–42)
MCH RBC QN AUTO: 29.9 PG (ref 26–35)
MCHC RBC AUTO-ENTMCNC: 32.3 % (ref 32–34.5)
MCV RBC AUTO: 92.8 FL (ref 80–99.9)
MONOCYTES ABSOLUTE: 0.16 E9/L (ref 0.1–0.95)
MONOCYTES RELATIVE PERCENT: 3.5 % (ref 2–12)
NEUTROPHILS ABSOLUTE: 3.24 E9/L (ref 1.8–7.3)
NEUTROPHILS RELATIVE PERCENT: 80.5 % (ref 43–80)
PDW BLD-RTO: 14 FL (ref 11.5–15)
PLATELET # BLD: 115 E9/L (ref 130–450)
PMV BLD AUTO: 10 FL (ref 7–12)
POIKILOCYTES: ABNORMAL
POTASSIUM SERPL-SCNC: 4.1 MMOL/L (ref 3.5–5)
RBC # BLD: 3.74 E12/L (ref 3.8–5.8)
SODIUM BLD-SCNC: 140 MMOL/L (ref 132–146)
TOTAL PROTEIN: 7.6 G/DL (ref 6.4–8.3)
WBC # BLD: 4 E9/L (ref 4.5–11.5)

## 2022-08-01 PROCEDURE — 82378 CARCINOEMBRYONIC ANTIGEN: CPT

## 2022-08-01 PROCEDURE — 80053 COMPREHEN METABOLIC PANEL: CPT

## 2022-08-01 PROCEDURE — 36415 COLL VENOUS BLD VENIPUNCTURE: CPT

## 2022-08-01 PROCEDURE — 85025 COMPLETE CBC W/AUTO DIFF WBC: CPT

## 2022-08-02 ENCOUNTER — OFFICE VISIT (OUTPATIENT)
Dept: ONCOLOGY | Age: 72
End: 2022-08-02
Payer: MEDICARE

## 2022-08-02 ENCOUNTER — HOSPITAL ENCOUNTER (OUTPATIENT)
Dept: INFUSION THERAPY | Age: 72
Discharge: HOME | End: 2022-08-02
Payer: MEDICARE

## 2022-08-02 VITALS
SYSTOLIC BLOOD PRESSURE: 147 MMHG | WEIGHT: 220.8 LBS | TEMPERATURE: 97.1 F | HEIGHT: 73 IN | HEART RATE: 59 BPM | DIASTOLIC BLOOD PRESSURE: 72 MMHG | OXYGEN SATURATION: 97 % | BODY MASS INDEX: 29.26 KG/M2

## 2022-08-02 DIAGNOSIS — C78.7 RECTAL CANCER METASTASIZED TO LIVER (HCC): Primary | ICD-10-CM

## 2022-08-02 DIAGNOSIS — C20 RECTAL CANCER METASTASIZED TO LIVER (HCC): Primary | ICD-10-CM

## 2022-08-02 DIAGNOSIS — C20 RECTAL CANCER (HCC): Primary | ICD-10-CM

## 2022-08-02 PROCEDURE — 99215 OFFICE O/P EST HI 40 MIN: CPT | Performed by: INTERNAL MEDICINE

## 2022-08-02 PROCEDURE — 96523 IRRIG DRUG DELIVERY DEVICE: CPT

## 2022-08-02 PROCEDURE — 2580000003 HC RX 258: Performed by: INTERNAL MEDICINE

## 2022-08-02 PROCEDURE — 6360000002 HC RX W HCPCS: Performed by: INTERNAL MEDICINE

## 2022-08-02 PROCEDURE — 1123F ACP DISCUSS/DSCN MKR DOCD: CPT | Performed by: INTERNAL MEDICINE

## 2022-08-02 PROCEDURE — 99214 OFFICE O/P EST MOD 30 MIN: CPT

## 2022-08-02 RX ORDER — SODIUM CHLORIDE 0.9 % (FLUSH) 0.9 %
10 SYRINGE (ML) INJECTION PRN
Status: DISCONTINUED | OUTPATIENT
Start: 2022-08-02 | End: 2022-08-03 | Stop reason: HOSPADM

## 2022-08-02 RX ORDER — HEPARIN SODIUM (PORCINE) LOCK FLUSH IV SOLN 100 UNIT/ML 100 UNIT/ML
500 SOLUTION INTRAVENOUS PRN
Status: CANCELLED | OUTPATIENT
Start: 2022-08-02

## 2022-08-02 RX ORDER — SODIUM CHLORIDE 0.9 % (FLUSH) 0.9 %
10 SYRINGE (ML) INJECTION PRN
Status: CANCELLED | OUTPATIENT
Start: 2022-08-02

## 2022-08-02 RX ORDER — HEPARIN SODIUM (PORCINE) LOCK FLUSH IV SOLN 100 UNIT/ML 100 UNIT/ML
500 SOLUTION INTRAVENOUS PRN
Status: DISCONTINUED | OUTPATIENT
Start: 2022-08-02 | End: 2022-08-03 | Stop reason: HOSPADM

## 2022-08-02 RX ADMIN — SODIUM CHLORIDE, PRESERVATIVE FREE 10 ML: 5 INJECTION INTRAVENOUS at 08:38

## 2022-08-02 RX ADMIN — Medication 500 UNITS: at 08:38

## 2022-08-02 NOTE — PROGRESS NOTES
[place] [id number]           Attending Clinic Note     Reason for Visit: Follow-up on a patient with Metastatic Rectal Cancer. PCP: Lakisha Collins MD     History of Present Illness:  68 y/o  male who was referred to see Dr. Kyle Tamayo (GI team) for evaluation of bright red blood per rectum, mild anemia and change in bowel habits with diarrhea. CEA 2640 on 10/19/2015. AlcP 299 AST 57 ALT 75 on 10/19/2015. Colonoscopy in 2013 noted no significant polyps, colitis or lesions at that time. Denies any Family History of colorectal cancer or polyps. Colonoscopy on 10/19/2015 revealed:  1. Ascending polyp, 8 mm, hot snare: Tubulovillous adenoma. 2. Transverse polyp, 1 cm, hot snare: Serrated polyp most consistent with sessile serrated adenoma. 3. Five splenic flexure polyps, three - 5 mm, 7 mm, 8 mm, hot snare and biopsy: Four segments of Tubular Adenoma  4. Descending polyp, 5 mm, biopsy: Tubular adenoma. 5. Sigmoid polyp, 4 mm biopsy, Serrated polyp most consistent with sessile serrated adenoma. 6. Two rectal polyps, 4 mm biopsy: Serrated polyp most consistent with hyperplastic polyp. 7. Rectosigmoid colon mass (large mass approximately 65% circumference of the lumen; Very friable, firm and hard): Tubulovillous adenoma with associated focal erosion and fibroplasia. CT scan abdomen/pelvis on 10/26/2015:  1. Small nodules at lung bases likely represent metastatic colon   cancer. 2. Extensive likely metastatic colon cancer throughout the liver. 3. Mild mural thickening and luminal narrowing in the   terminal ileum, otherwise nonspecific. Colonoscopy with snare removal rectal mass was performed by Dr. Etienne Hutson. Pathology proved:  Rectal polyp: Invasive adenocarcinoma involving villous adenoma and extending to the cauterized edge of excision. KRAS Mutation: Mutation detected.   BRAF Mutation: Mutation not detected. 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 CEA 3488. Bone scan on 11/10/2015 noted no metastatic disease. CT chest on 11/10/2015 revealed Multiple pulmonary nodules in the upper and lower lobes consistent with metastatic disease;   Hepatic metastasis also visualized. 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 Mediport was placed. Cycle # 1 of FOLFOX + Avastin was on 11/23/2015. CEA was 4555 on 11/23/2015. Cycle # 2 of FOLFOX + Avastin was on 12/08/2015. CEA was 3160 on 12/08/2015. Cycle # 3 of FOLFOX + Avastin was on 12/22/2015. CEA was 2516 on 12/21/2015. Cycle # 4 of FOLFOX + Avastin was on 01/05/2016. CEA was 2098 on 01/05/2015. Cycle # 5 of FOLFOX + Avastin was on 01/19/2016. CEA was 1511 on 01/05/2015.     -Bone scan on 01/26/2016 noted no metastatic disease. CT chest on 01/26/2016 revealed Significant response to treatment with no visible residual nodules. CT scan abdomen/pelvis on 01/26/2016 noted Interval decreased size of multiple masses in the liver compatible with treatment response. Continue another 2 months of FOLFOX + Avastin and repeat scans. Cycle # 6 of FOLFOX + Avastin was on 02/02/2016.  on 02/02/2016. Cycle # 7 of FOLFOX + Avastin was on 02/16/2016.  on 02/16/2016. Cycle # 8 of FOLFOX + Avastin was on 03/01/2016.  on 03/01/2016. Cycle # 9 of FOLFOX + Avastin was on 03/15/2016.  on 03/15/2016. Cycle # 10 of FOLFOX + Avastin was on 03/29/2016. .4 on 03/29/2016. Cycle # 11 of FOLFOX + Avastin was on 04/12/2016. .4 on 04/12/2016. Re-staging scans on 04/19/2016: CT Chest: clear lungs; no evidence of recurring pulmonary nodule; CT Abdomen/Pelvis: Further interval decrease in size of the multiple metastatic hepatic lesions, the largest lesion now measures 3.9 x 3.5 cm and previously measured 4.5 cm in maximum diameter.  No mesenteric lymphadenopathy is identified; Bone Scan: 08/29/2016 (to look at the cecal area) unremarkable. CEA 26.7 on 08/30/2016. Cycle # 4 FOLFIRI/Avastin was on 09/13/2016. CEA 23.4 on 09/13/2016. Cycle # 5 FOLFIRI/Avastin was on 09/27/2016. CEA 22.3 on 09/27/2016. Cycle # 6 FOLFIRI/Avastin was on 10/11/2016. CEA 18.4 on 10/11/2016. Bone scan 10/21/2016 stable bone metastasis; ? New lesion anterior left sixth rib likely interval healing fracture. CT abdomen/pelvis revealed stable hepatic metastasis. CT chest negative for metastatic disease. Continue FOLFIRI/Avastin and repeat scans in 3 months. Cycle # 7 FOLFIRI/Avastin was on 10/25/2016. CEA 16.1  Cycle # 8 FOLFIRI/Avastin was on 11/08/2016. CEA 15.7  Cycle # 9 FOLFIRI/Avastin was on 11/29/2016. CEA 13.6 on 11/29/2016. Cycle # 10 FOLFIRI/Avastin was on 12/13/2016. CEA 10.6 on 12/13/2016. Cycle # 11 FOLFIRI/Avastin was on 12/27/2016. CEA 11.1 on 12/27/2016. Cycle # 12 FOLFIRI/Avastin was on 01/10/2017. CEA 9.7 on 01/10/2017. CT chest 01/23/2017 No new pulmonary nodule or lymphadenopathy. Patchy groundglass opacities within the left lower lobe, suggestive of infectious or inflammatory etiology. Followup CT chest in 3 months. Slight increased linear sclerosis along the left anterior sixth rib corresponding to an area of increased uptake on the prior bone scan from 10/21/2016, favored to be related to interval healing changes of a nondisplaced fracture. CT abdomen/pelvis on 01/23/2017 Redemonstration of multiple hepatic metastases, which are similar compared to the prior CT from 10/21/2016. Redemonstration of area of increased sclerosis along the right acetabulum suspicious for metastatic disease. Bone scan on 01/23/2017 Stable subtle uptake within the left proximal diaphysis, again suggestive of metastatic disease  Decreased subtle uptake within the medial right acetabulum.    Decreased uptake within the left anterior sixth rib corresponding to linear sclerosis on the recent CT, favored to be related to an joint rib fracture. Continue FOLFIRI + Avastin and repeat scans in 3 months. Cycle # 13 FOLFIRI + Avastin was on 01/24/2017. Cycle # 14 FOLFIRI + Avastin was on 02/07/2017. Cycle # 15 FOLFIRI + Avastin was on 02/21/2017. Cycle # 16 FOLFIRI + Avastin was on 03/07/2017. Cycle # 17 FOLFIRI + Avastin was on 03/21/2017. Cycle # 18 FOLFIRI + Avastin was on 04/04/2017. CT chest 04/17/2017 negative for metastatic disease. CT abdomen/pelvis 04/17/2017 Stable hypodense metastatic lesions within the liver. Stable area of increased sclerosis along the right acetabulum  Bone scan 04/17/2017 Stable subtle uptake within the left proximal femoral diaphysis; No definite abnormal uptake in the region of a sclerotic lesion along the posterior column of the right acetabulum noted on CT. Stable slight uptake within the left anterior sixth rib corresponding to linear sclerosis on the recent CT, favored to be related to a fracture. Continue FOLFIRI + Avastin and repeat scans in 3 months. Cycle # 19 FOLFIRI + Avastin was on 04/18/2017. Cycle # 20 FOLFIRI + Avastin was on 05/02/2017. Cycle # 21 FOLFIRI + Avastin was on 05/16/2017. Cycle # 22 FOLFIRI + Avastin was on 05/30/2017. Cycle # 23 FOLFIRI + Avastin was on 06/13/2017. Cycle # 24 FOLFIRI + Avastin was on 06/27/2017. Cycle # 25 FOLFIRI + Avastin was on 07/11/2017. Cycle # 26 FOLFIRI + Avastin was on 07/25/2017. Cycle # 27 FOLFIRI + Avastin was on 08/08/2017. Cycle # 28 FOLFIRI + Avastin was on 08/22/2017. Cycle # 29 FOLFIRI + Avastin was on 09/05/2017. Cycle # 30 FOLFIRI + Avastin was on 09/19/2017. Bone scan 09/26/2017 stable. CT abdomen/pelvis on 09/26/2017 Stable hypodense mass in the liver. Stable sclerotic lesion in the acetabulum. CT chest 09/26/2017 negative for metastatic disease. Cycle # 31 FOLFIRI + Avastin was on 10/03/2017. CEA 7.4 on 10/03/2017. Cycle # 32 FOLFIRI + Avastin was on 10/17/2017.  CEA 6.0 on 10/17/2017. Cycle # 33 FOLFIRI + Avastin was on 10/31/2017. CEA 6.8 on 10/31/2017. Cycle # 34 FOLFIRI + Avastin was on 11/14/2017. CEA 7.2 on 11/14/2017. Cycle # 35 FOLFIRI + Avastin was on 11/28/2017. CEA 5.1 on 11/28/2017. Cycle # 36 FOLFIRI + Avastin was on 12/12/2017. CEA 6.1 on 12/12/2017. Bone scan 12/22/2017 noted no evidence of metastatic disease to the axial or appendicular skeleton. CT chest 12/22/2017 noted no evidence of metastatic disease. CT abdomen/pelvis 12/22/2017 noted stable hypodense lesions in the liver. Continue FOLFIRI + Avastin and repeat scans in 3 months. Cycle # 37 FOLFIRI + Avastin was on 12/27/2018. Cycle # 38 FOLFIRI + Avastin was on 01/09/2018. Cycle # 39 FOLFIRI + Avastin was on 01/23/2018. Cycle # 40 FOLFIRI + Avastin was on 02/06/2018. Cycle # 41 FOLFIRI + Avastin was on 02/20/2018. Cycle # 42 FOLFIRI + Avastin was on 03/06/2018. Cycle # 43 FOLFIRI + Avastin was on 03/20/2018. Bone scan on 03/26/2018 negative for metastatic disease. CT chest 03/26/2018 noted ? new 7 mm nodule in LUCY. CT abdomen/pelvis 03/26/2018 noted stable hypodense lesions in the liver. ? New small ascites in the abdomen. Patient wants to continue to monitor the lung finding and repeat scans in 2 months instead of changing the current regimen into oral Lonsurf. Cycle # 44 FOLFIRI + Avastin was on 04/03/2018. CEA 6.3 on 04/03/2018. Cycle # 45 FOLFIRI + Avastin was on 04/24/2018. CEA 5.3 on 04/24/2018. Cycle # 46 FOLFIRI + Avastin was on 05/08/2018. CEA 5.4 on 05/08/2018. Cycle # 47 FOLFIRI + Avastin was on 05/22/2018. CEA 6.1 on 05/22/2018. CT chest 05/31/2018 noted stable nodule in LUCY. No evidence of worsening malignancy. CT abdomen/pelvis on 05/31/2018 noted stable liver metastasis. No evidence of worsening malignancy. Continue FOLFIRI + Avastin and repeat scans in 3 months. Cycle # 48 FOLFIRI + Avastin was on 06/06/2018. Cycle # 49 FOLFIRI + Avastin was on 06/19/2018. Cycle # 50 FOLFIRI + Avastin was on 07/03/2018. Cycle # 51 FOLFIRI + Avastin was on 07/24/2018. Cycle # 52 FOLFIRI + Avastin was on 08/14/2018. Cycle # 53 FOLFIRI + Avastin was on 08/28/2018. CT chest 09/06/2018 noted interval decrease in size of LUCY measuring up to 4 mm, suggestive of treatment response. No mediastinal or hilar LN  CT abdomen/pelvis 09/06/2018 Slight interval increase in size of a previously noted metastatic lesion within the right hepatic lobe. This may be related to differences in phase of enhancement compared to the most recent study from 5/31/2018, the lesion remains decreased in size compared to the more previous studies. No abdominal, retroperitoneal, or pelvic lymphadenopathy. Continue FOLFIRI + Avastin and repeat scans in 2 months. Cycle # 54 FOLFIRI + Avastin was on 09/11/2018. Cycle # 55 FOLFIRI + Avastin was on 09/25/2018. Cycle # 56 FOLFIRI + Avastin was on 10/09/2018. Cycle # 57 FOLFIRI + Avastin was on 10/23/2018. CT chest 11/02/2018 stable 3 mm nodule within LUCY. No new right and left lung nodule. No mediastinal or osseous lesion. CT abdomen/pelvis 11/02/2018 stable metastatic disease to liver. No new metastatic disease identified. No mesenteric or retroperitoneal LN. No gross colonic lesion identified. Continue FOLFIRI + Avastin and repeat scans in 3 months. Cycle # 58 FOLFIRI + Avastin was on 11/06/2018. CEA 7.6 on 11/05/2018. Cycle # 59 FOLFIRI + Avastin was on 11/27/2018. CEA 6.9 on 11/26/2018. Cycle # 60 FOLFIRI + Avastin was on 12/11/2018. CEA 6.7 on 12/11/2018. Cycle # 61 FOLFIRI + Avastin was on 01/08/2019. CEA 5.6 on 01/07/2019. Cycle # 62 FOLFIRI + Avastin was on 01/29/2019. CEA 4.6 on 01/28/2019. Cycle # 63 FOLFIRI + Avastin was on 02/12/2019. CEA 4.3 on 02/11/2019. CT chest 02/21/2019 no evidence of metastatic disease. CT abdomen/pelvis 02/21/2019 stable hypodense liver lesions. No evidence of worsening metastatic disease.  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 Ordered MRI thoracic spine and referred to neurosurgery team.    Cycle # 64 FOLFIRI + Avastin was on 02/26/2019. CEA 4.7 on 02/25/2019. Cycle # 65 FOLFIRI + Avastin was on 03/12/2019. CEA 4.0 on 03/11/2019. Cycle # 66 FOLFIRI + Avastin was on 03/26/2019. CEA 4.7 on 03/25/2019. Cycle # 67 FOLFIRI + Avastin was on 04/09/2019. CEA 5.6 on 04/08/2019. Cycle # 68 FOLFIRI + Avastin was on 04/30/2019. CEA 4.9 on 04/29/2019. Cycle # 69 FOLFIRI + Avastin was on 05/14/2019. CEA 5.3 on 05/14/2019. CT chest 05/23/2019 stable small lung nodule with no evidence of metastatic disease. CT abdomen/pelvis 05/23/2019 Decrease conspicuity of the liver lesions. No new lesions seen. Stable splenomegaly. Continue FOLFIRI + Avastin and repeat scans in 3 months. Cycle # 70 FOLFIRI + Avastin was on 06/04/2019. CEA 4.8 on 06/03/2019. Cycle # 71 FOLFIRI + Avastin was on 06/18/2019. CEA 4.6 on 06/17/2019. Cycle # 72 FOLFIRI + Avastin was on 07/09/2019. CEA 4.3 on 07/08/2019. Cycle # 73 FOLFIRI + Avastin was on 07/30/2019. CEA 5.0 on 07/29/2019. Cycle # 74 FOLFIRI + Avastin was on 08/13/2019. CEA 5.0 on 08/12/2019. CT chest 08/20/2019 negative  CT abdomen/pelvis 08/20/2019 Previous identified hepatic lesions are not visualized on the current exam.  Continue FOLFIRI + Avastin and repeat scans in 3 months. Cycle # 75 FOLFIRI + Avastin was on 08/27/2019. CEA 5.0 on 08/26/2019. Cycle # 76 FOLFIRI + Avastin was on 09/17/2019. CEA 5.5 on 09/16/2019. Cycle # 77 FOLFIRI + Avastin was on 10/15/2019. CEA 4.6 on 10/15/2019. Cycle # 78 FOLFIRI + Avastin was on 10/29/2019. CEA 4.1 on 10/28/2019. Cycle # 79 FOLFIRI + Avastin was on 11/12/2019. CEA 4.3 on 11/12/2019. Cycle # 80 FOLFIRI + Avastin was on 12/10/2019. CEA 4.0 on 12/09/2019.   CT chest 12/19/2019 Stable 3 mm nodule within the left upper lobe, similar to the previous studies dating back to 11/2/2018, however decreased in size compared to the CT from 3/26/2018. No thoracic LN. CT abdomen/pelvis 12/19/2019 Previously described small hypodense lesions are more conspicuous on the current study compared to the recent study throughout the liver from 8/20/2019, however similar to the prior study from 2/21/2019. Findings may be related to differences in contrast opacification. No abdominal or pelvic lymphadenopathy. Continue FOLFIRI + Avastin and repeat scans in 2 months to f/u on liver lesions. Cycle # 81 FOLFIRI + Avastin was on 01/07/2020. CEA 3.8 on 01/06/2020. Cycle # 82 FOLFIRI + Avastin was on 01/21/2020. CEA 3.7 on 01/20/2020. Cycle # 83 FOLFIRI + Avastin was on 02/04/2020. CEA 4.1 on 02/03/2020. Cycle # 84 FOLFIRI + Avastin was on 02/18/2020. CEA 4.6 on 02/17/2020. EGD by Dr. Ursula Johns 03/02/2020 showing no masses or lesions  Cycle # 85 FOLFIRI + Avastin was on 03/03/2020. CEA 7.4 on 03/02/2020. Cycle # 86 FOLFIRI + Avastin was on 03/17/2020. CEA 4.5 on 03/16/2020. Colonoscopy on 03/23/2020 by Dr. Ursula aguero (records requested). Cycle # 87 FOLFIRI + Avastin was on 03/31/2020. CEA 4.1 on 03/30/2020. Cycle # 88 FOLFIRI + Avastin was on 04/21/2020. CEA 6.4 on 04/20/2020. Cycle # 89 FOLFIRI + Avastin was on 05/05/2020. CEA 5.8 on 05/04/2020. Cycle # 90 FOLFIRI + Avastin was on 06/02/2020. CEA 5.5 on 06/01/2020. Cycle # 91 FOLFIRI + Avastin was on 06/16/2020. CEA 5.6 on 06/15/2020. CT chest 06/23/2020 noted no metastatic disease in the chest.   CT abdomen/pelvis 06/23/2020 Stable small liver lesions measuring up to 5 mm since 2019.   22 mm round mass in segment 8 of the liver with central high density stable from December 2019), previously measuring up to 34 mm in 2017. No additional metastatic disease in the abdomen or pelvis. Small amount of ascites. Overall stable disease. Continue FOLFIRI + Avastin and repeat scans in 2-3 months. Cycle # 92 FOLFIRI + Avastin was on 07/07/2020.  CEA 5.7 on 07/06/2020. Cycle # 93 FOLFIRI + Avastin was on 07/21/2020. CEA 5.9 on 07/20/2020. Cycle # 94 FOLFIRI + Avastin was on 08/04/2020. CEA 5.5 on 08/04/2020. Cycle # 95 FOLFIRI + Avastin was on 08/25/2020. CEA 6.1 on 08/24/2020. CT chest 9/2/2020 stable unremarkable enhanced CT of the thorax. There is no metastatic disease to the lungs. CT abdomen pelvis on 9/2/2020 stable 2.2 cm low attenuated lesion within the central aspect of the right hepatic lobe favored to represent treated metastatic disease. No new hepatic lesion is identified. Stable splenomegaly  There is no appreciable colonic lesion or stricture  Pericholecystic fluid of uncertain etiology. General surgery team consulted. Laparoscopic cholecystectomy with normal cholangiogram 10/27/20  Gallbladder: Chronic cholecystitis and cholelithiasis. Unremarkable regional lymph node. 11/13/2020; Seen by Dr. Tomas Franklin from General surgery team; cleared to re-start chemotherapy. Cycle # 96 FOLFIRI + Avastin was on 11/17/2020. CEA 3.6 on 11/16/2020. Cycle # 97 FOLFIRI + Avastin was on 12/01/2020. CEA 3.5 on 11/30/2020. Cycle # 98 FOLFIRI + Avastin was on 12/15/2020. CEA 4.3 on 12/15/2020. Cycle # 99 FOLFIRI + Avastin was on 01/05/2021. CEA 4.7 on 01/04/2021. CT chest 01/13/2021 negative for metastatic disease. CT abdomen/pelvis 01/13/2021 Unchanged central hepatic lesion. No specific signs of abdominopelvic metastatic disease. Continue FOLFIRI + Avastin and repeat scans in 3 months. Cycle # 100 FOLFIRI + Avastin was on 01/19/2021. CEA 4.7 on 01/18/2021. Cycle # 101 FOLFIRI + Avastin was on 02/02/2021. CEA 5.2 on 02/01/2021. Cycle # 102 FOLFIRI + Avastin was on 02/16/2021. CEA 5.6 on 02/15/2021. Cycle # 103 FOLFIRI + Avastin was on 03/02/2021. Cycle # 104 FOLFIRI (20% dose reduced due to significant toxicity) + Avastin was on 03/16/2021. Cycle # 105 FOLFIRI (same dose as cycle # 104) + Avastin was on 03/30/2021.  CEA 6.5 on 03/29/2021. Cycle # 106 FOLFIRI (same dose as cycle # 104) + Avastin was on 04/13/2021. CEA 6.0 on 04/12/2021  CT chest 04/20/2021 no evidence of metastatic disease. CT abdomen/pelvis 04/20/2021 No evidence of progressive metastatic disease. Stable 2 cm lesion in the right lobe of the liver, likely representing treated metastatic disease. Imaging reviewed. Continue FOLFIRI + Avastin and repeat scans in 3 months  Cycle # 107 FOLFIRI + Avastin was on 04/27/2021  Cycle # 108 FOLFIRI + Avastin was on 05/11/2021. Cycle # 109 FOLFIRI (held Avastin due to upcoming surgery) on 05/25/2021. CEA 6.3 on 5/24/21  Cycle # 110 FOLFIRI (held Avastin due to upcoming surgery) on 06/08/2021. CEA 6.3 on 6/07/21. Right inguinal/scrotol hernia repair on 07/13/2021 with Dr Varinder Nava with folely removal on Friday 07/23/2021. He developed fever on 07/24/2021 and was brought to [address] [place], [place] [address] ER via EMS; Sepsis secondary to urinary tract infection   Completed Abx  CEA 4.1 on 08/30/2021  Cycle # 111 FOLFIRI was on 08/31/2021. HBP team for evaluation of liver lesions; His lesion does appear to be a hemangioma as his prior metastatic deposits in his liver were hypo attenuating compared to this CT which is hyper attenuating. compared to his prior CT scans he has disappearing liver metastasis. MRI liver on 09/17/2021 No suspicious liver lesion identified. Colonoscopy GI Dr. Lewis Tong on 09/20/2021 unremarkable  CEA 3.3 on 09/27/2021. CEA 3.2 on 10/25/2021. CEA 2.7 on 11/22/2021. CEA 2.5 on 12/27/2021. CEA 2.4 on 01/24/2022. CEA 2.3 on 02/21/2022. CEA 2.4 on 03/21/2022. CEA 2.2 on 04/18/2022  CEA 2.2 on 05/16/2022  CEA 2.3 on 06/06/2022  HBP note 06/14/2022 reviewed. No evidence of recurrence on repeat imaging. CEA 2.4 on 07/02/2022  CEA 2.3 on 08/01/2022  Today 08/02/2022 for f/u. No fever, chills. No nausea/vomiting. Fair appetite and energy level. + arthritis    Review of Systems;  CONSTITUTIONAL: No fever, chills.  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 KRAS Mutation: Mutation detected. BRAF Mutation: Mutation not detected. 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 CT scan abdomen/pelvis on 10/26/2015 revealed small nodules at the lung bases, extensive liver lesions consistent with metastatic rectosigmoid cancer. CEA 3488 on 10/30/2015. Bone scan on 11/10/2015 noted no metastatic disease. CT chest on 11/10/2015 revealed Multiple pulmonary nodules in the upper and lower lobes consistent with metastatic disease; Hepatic metastasis also visualized. 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 Mediport was placed. Cycle # 1 of FOLFOX + Avastin was on 11/23/2015. CEA was 4555 on 11/23/2015. Cycle # 2 of FOLFOX + Avastin was on 12/08/2015. CEA was 3160 on 12/08/2015. Cycle # 3 of FOLFOX + Avastin was on 12/22/2015. CEA was 2516 on 12/21/2015. Cycle # 4 of FOLFOX + Avastin was on 01/05/2016. CEA was 2098 on 01/05/2015. Cycle # 5 of FOLFOX + Avastin was on 01/19/2016. CEA was 1511 on 01/05/2015.     -Bone scan on 01/26/2016 noted no metastatic disease. CT chest on 01/26/2016 revealed Significant response to treatment with no visible residual nodules. CT scan abdomen/pelvis on 01/26/2016 noted Interval decreased size of multiple masses in the liver compatible with treatment response. Continue another 2 months of FOLFOX + Avastin and repeat scans. Cycle # 6 of FOLFOX + Avastin was on 02/02/2016.  on 02/02/2016. Cycle # 7 of FOLFOX + Avastin was on 02/16/2016.  on 02/16/2016. Cycle # 8 of FOLFOX + Avastin was on 03/01/2016.  on 03/01/2016. Cycle # 9 of FOLFOX + Avastin was on 03/15/2016.  on 03/15/2016. Cycle # 10 of FOLFOX + Avastin was on 03/29/2016. .4 on 03/29/2016. Cycle # 11 of FOLFOX + Avastin was on 04/12/2016. .4 on 04/12/2016.      Re-staging scans on 04/19/2016: CT Chest: clear weeks started on 08/02/2016. Cycle # 2 FOLFIRI was on 08/16/2016. CEA 31.7 on 08/16/2016. Cycle # 3 FOLFIRI (added Avastin) was on 08/30/2016 given that ulcers healed on EGD 08/15/2016 by Dr. Mauricio Greer; Protonix bid since avastin re-started. Colonoscopy on 08/29/2016 (to look at the cecal area) unremarkable. CEA 26.7 on 08/30/2016. Cycle # 4 FOLFIRI/Avastin was on 09/13/2016. CEA 23.4 on 09/13/2016. Cycle # 5 FOLFIRI/Avastin was on 09/27/2016. CEA 22.3 on 09/27/2016. Cycle # 6 FOLFIRI/Avastin was on 10/11/2016. CEA 18.4 on 10/11/2016. Bone scan 10/21/2016 stable bone metastasis; ? New lesion anterior left sixth rib likely interval healing fracture. CT abdomen/pelvis revealed stable hepatic metastasis. CT chest negative for metastatic disease. Continue FOLFIRI/Avastin and repeat scans in 3 months. Cycle # 7 FOLFIRI/Avastin was on 10/25/2016. CEA 16.1 on 10/25/2016. Cycle # 8 FOLFIRI/Avastin was on 11/08/2016. CEA 15.7 on 11/08/2016. Cycle # 9 FOLFIRI/Avastin was on 11/29/2016. CEA 13.6 on 11/29/2016. Cycle # 10 FOLFIRI/Avastin was on 12/13/2016. CEA 10.6 on 12/13/2016. Cycle # 11 FOLFIRI/Avastin was on 12/27/2016. CEA 11.1 on 12/27/2016. Cycle # 12 FOLFIRI/Avastin was on 01/10/2017. CEA 9.7 on 01/10/2017. CT chest 01/23/2017 No new pulmonary nodule or lymphadenopathy. Patchy groundglass opacities within the left lower lobe, suggestive of infectious or inflammatory etiology. Followup CT chest in 3 months. Slight increased linear sclerosis along the left anterior sixth rib corresponding to an area of increased uptake on the prior bone scan from 10/21/2016, favored to be related to interval healing changes of a nondisplaced fracture. CT abdomen/pelvis on 01/23/2017 Redemonstration of multiple hepatic metastases, which are similar compared to the prior CT from 10/21/2016. Redemonstration of area of increased sclerosis along the right acetabulum suspicious for metastatic disease. Bone scan on 01/23/2017 Stable subtle uptake within the left proximal diaphysis, again suggestive of metastatic disease  Decreased subtle uptake within the medial right acetabulum. Decreased uptake within the left anterior sixth rib corresponding to linear sclerosis on the recent CT, favored to be related to an joint rib fracture. Continue FOLFIRI + Avastin and repeat scans in 3 months. Cycle # 13 FOLFIRI + Avastin was on 01/24/2017. Cycle # 14 FOLFIRI + Avastin was on 02/07/2017. Cycle # 15 FOLFIRI + Avastin was on 02/21/2017. Cycle # 16 FOLFIRI + Avastin was on 03/07/2017. Cycle # 17 FOLFIRI + Avastin was on 03/21/2017. Cycle # 18 FOLFIRI + Avastin was on 04/04/2017. CT chest 04/17/2017 negative for metastatic disease. CT abdomen/pelvis 04/17/2017 Stable hypodense metastatic lesions within the liver. Stable area of increased sclerosis along the right acetabulum  Bone scan 04/17/2017 Stable subtle uptake within the left proximal femoral diaphysis; No definite abnormal uptake in the region of a sclerotic lesion along the posterior column of the right acetabulum noted on CT. Stable slight uptake within the left anterior sixth rib corresponding to linear sclerosis on the recent CT, favored to be related to a fracture. Continue FOLFIRI + Avastin and repeat scans in 3 months. Cycle # 19 FOLFIRI + Avastin was on 04/18/2017. CEA 7.5 on 04/18/2017. Cycle # 20 FOLFIRI + Avastin was on 05/02/2017. CEA 7.7 on 05/02/2017. Cycle # 21 FOLFIRI + Avastin was on 05/16/2017. CEA 7.1 on 05/16/2017. Cycle # 22 FOLFIRI + Avastin was on 05/30/2017. CEA 8.2 on 05/30/2017. Cycle # 23 FOLFIRI + Avastin was on 06/13/2017. CEA 7.7 on 06/13/2017. Cycle # 24 FOLFIRI + Avastin was on 06/27/2017. CEA 6.6 on 06/27/2017.    Re-staging scans 07/05/2017:  Bone scan stable proximal left femoral diaphysis, right acetabulum, and left anterior sixth rib lesions;  CT abdomen/pelvis stable hypodense metastatic lesions within the liver; CT chest without convincing evidence of metastatic disease. Cycle # 25 FOLFIRI + Avastin was on 07/11/2017. CEA 6.3 on 07/11/2017. Cycle # 26 FOLFIRI + Avastin was on 07/25/2017. CEA 7.3 on 07/25/2017. Cycle # 27 FOLFIRI + Avastin was on 08/08/2017. CEA 6.5 on 08/08/2017. Cycle # 28 FOLFIRI + Avastin was on 08/22/2017. CEA 5.9 on 08/22/2017. Cycle # 29 FOLFIRI + Avastin was on 09/05/2017. CEA 6.3 on 09/05/2017. Cycle # 30 FOLFIRI + Avastin was on 09/19/2017. CEA 6.3 on 09/19/2017. Bone scan 09/26/2017 stable. CT abdomen/pelvis on 09/26/2017 Stable hypodense mass in the liver. Stable sclerotic lesion in the acetabulum. CT chest 09/26/2017 negative for metastatic disease. Cycle # 31 FOLFIRI + Avastin was on 10/03/2017. CEA 7.4 on 10/03/2017. Cycle # 32 FOLFIRI + Avastin was on 10/17/2017. CEA 6.0 on 10/17/2017. Cycle # 33 FOLFIRI + Avastin was on 10/31/2017. CEA 6.8 on 10/31/2017. Cycle # 34 FOLFIRI + Avastin was on 11/14/2017. CEA 7.2 on 11/14/2017. Cycle # 35 FOLFIRI + Avastin was on 11/28/2017. CEA 5.1 on 11/28/2017. Cycle # 36 FOLFIRI + Avastin was on 12/12/2017. CEA 6.1 on 12/12/2017. Bone scan 12/22/2017 noted no evidence of metastatic disease to the axial or appendicular skeleton. CT chest 12/22/2017 noted no evidence of metastatic disease. CT abdomen/pelvis 12/22/2017 noted stable hypodense lesions in the liver. Continue FOLFIRI + Avastin and repeat scans in 3 months. Cycle # 37 FOLFIRI + Avastin was on 12/27/2018. CEA 6.7 on 12/27/2017. Cycle # 38 FOLFIRI + Avastin was on 01/09/2018. CEA 5.0 on 01/09/2018. Cycle # 39 FOLFIRI + Avastin was on 01/23/2018. CEA 6.5 on 01/23/2018. Cycle # 40 FOLFIRI + Avastin was on 02/06/2018. CEA 6.9 on 02/06/2018. Cycle # 41 FOLFIRI + Avastin was on 02/20/2018. CEA 6.4 on 02/20/2018. Cycle # 42 FOLFIRI + Avastin was on 03/06/2018. CEA 6.6 on 03/06/2018. Cycle # 43 FOLFIRI + Avastin was on 03/20/2018.  CEA 5.7 on 03/20/2018. Bone scan on 03/26/2018 negative for metastatic disease. CT chest 03/26/2018 noted ? new 7 mm nodule in LUCY. CT abdomen/pelvis 03/26/2018 noted stable hypodense lesions in the liver. ? New small ascites in the abdomen. Patient wants to continue to monitor the lung finding and repeat scans in 2 months instead of changing the current chemotherapy regimen to oral Lonsurf. Cycle # 44 FOLFIRI + Avastin was on 04/03/2018. CEA 6.3 on 04/03/2018. Cycle # 45 FOLFIRI + Avastin was on 04/24/2018. CEA 5.3 on 04/24/2018. Cycle # 46 FOLFIRI + Avastin was on 05/08/2018. CEA 5.4 on 05/08/2018. Cycle # 47 FOLFIRI + Avastin was on 05/22/2018. CEA 6.1 on 05/22/2018. CT chest 05/31/2018 noted stable nodule in LUCY. No evidence of worsening malignancy. CT abdomen/pelvis on 05/31/2018 noted stable liver metastasis. No evidence of worsening malignancy. Continue FOLFIRI + Avastin and repeat scans in 3 months. Cycle # 48 FOLFIRI + Avastin was on 06/06/2018. CEA 6.3 on 06/05/2018. Cycle # 49 FOLFIRI + Avastin was on 06/19/2018. CEA 6.1 on 06/19/2018. Cycle # 50 FOLFIRI + Avastin was on 07/03/2018. CEA 7.4 on 07/03/2018. Cycle # 51 FOLFIRI + Avastin was on 07/24/2018. CEA 6.7 on 07/24/2018. Cycle # 52 FOLFIRI + Avastin was on 08/14/2018. CEA 6.8 on 08/14/2018. Cycle # 53 FOLFIRI + Avastin was on 08/28/2018. CEA 6.5 on 08/27/2018. CT chest 09/06/2018 noted interval decrease in size of LUCY measuring up to 4 mm, suggestive of treatment response. No mediastinal or hilar LN  CT abdomen/pelvis 09/06/2018 Slight interval increase in size of a previously noted metastatic lesion within the right hepatic lobe. This may be related to differences in phase of enhancement compared to the most recent study from 5/31/2018, the lesion remains decreased in size compared to the more previous studies. No abdominal, retroperitoneal, or pelvic lymphadenopathy. Continue FOLFIRI + Avastin and repeat scans in 2 months. Cycle # 54 FOLFIRI + Avastin was on 09/11/2018. CEA 6.4 on 09/10/2018. Cycle # 55 FOLFIRI + Avastin was on 09/25/2018. CEA 6.4 on 09/25/2018. Cycle # 56 FOLFIRI + Avastin was on 10/09/2018. CEA 6.7 on 10/08/2018. Cycle # 57 FOLFIRI + Avastin was on 10/23/2018. CEA 7.2 on 10/22/2018. CT chest 11/02/2018 stable 3 mm nodule within LUCY. No new right and left lung nodule. No mediastinal or osseous lesion. CT abdomen/pelvis 11/02/2018 stable metastatic disease to liver. No new metastatic disease identified. No mesenteric or retroperitoneal LN. No gross colonic lesion identified. Continue FOLFIRI + Avastin and repeat scans in 3 months. Cycle # 58 FOLFIRI + Avastin was on 11/06/2018. CEA 7.6 on 11/05/2018. Cycle # 59 FOLFIRI + Avastin was on 11/27/2018. CEA 6.9 on 11/26/2018. Cycle # 60 FOLFIRI + Avastin was on 12/11/2018. CEA 6.7 on 12/11/2018. Cycle # 61 FOLFIRI + Avastin was on 01/08/2019. CEA 5.6 on 01/07/2019. Cycle # 62 FOLFIRI + Avastin was on 01/29/2019. CEA 4.6 on 01/28/2019. Cycle # 63 FOLFIRI + Avastin was on 02/12/2019. CEA 4.3 on 02/11/2019. CT chest 02/21/2019 no evidence of metastatic disease. CT abdomen/pelvis 02/21/2019 stable hypodense liver lesions. No evidence of worsening metastatic disease. 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 Ordered MRI thoracic spine and referred to neurosurgery team.  Cycle # 64 FOLFIRI + Avastin was on 02/26/2019. CEA 4.7 on 02/25/2019. Cycle # 65 FOLFIRI + Avastin was on 03/12/2019. CEA 4.0 on 03/11/2019. Cycle # 66 FOLFIRI + Avastin was on 03/26/2019. CEA 4.7 on 03/25/2019. Cycle # 67 FOLFIRI + Avastin was on 04/09/2019. CEA 5.6 on 04/08/2019. Cycle # 68 FOLFIRI + Avastin was on 04/30/2019. CEA 4.9 on 04/29/2019. Cycle # 69 FOLFIRI + Avastin was on 05/14/2019. CEA 5.3 on 05/14/2019. CT chest 05/23/2019 stable small lung nodule with no evidence of metastatic disease.    CT abdomen/pelvis 05/23/2019 Decrease conspicuity of the liver lesions. No new lesions seen. Stable splenomegaly. Continue FOLFIRI + Avastin and repeat scans in 3 months. Cycle # 70 FOLFIRI + Avastin was on 06/04/2019. CEA 4.8 on 06/03/2019. Cycle # 71 FOLFIRI + Avastin was on 06/18/2019. CEA 4.6 on 06/17/2019. Cycle # 72 FOLFIRI + Avastin was on 07/09/2019. CEA 4.3 on 07/08/2019. Cycle # 73 FOLFIRI + Avastin was on 07/30/2019. CEA 5.0 on 07/29/2019. Cycle # 74 FOLFIRI + Avastin was on 08/13/2019. CEA 5.0 on 08/12/2019. CT chest 08/20/2019 negative  CT abdomen/pelvis 08/20/2019 Previous identified hepatic lesions are not visualized on the current exam.  Continue FOLFIRI + Avastin and repeat scans in 3 months. Cycle # 75 FOLFIRI + Avastin was on 08/27/2019. CEA 5.0 on 08/26/2019. Cycle # 76 FOLFIRI + Avastin was on 09/17/2019. CEA 5.5 on 09/16/2019. Cycle # 77 FOLFIRI + Avastin was on 10/15/2019. CEA 4.6 on 10/15/2019. Cycle # 78 FOLFIRI + Avastin was on 10/29/2019. CEA 4.1 on 10/28/2019. Cycle # 79 FOLFIRI + Avastin was on 11/12/2019. CEA 4.3 on 11/12/2019. Cycle # 80 FOLFIRI + Avastin was on 12/10/2019. CEA 4.0 on 12/09/2019. CT chest 12/19/2019 Stable 3 mm nodule within the left upper lobe, similar to the previous studies dating back to 11/2/2018, however decreased in size compared to the CT from 3/26/2018. No thoracic LN. CT abdomen/pelvis 12/19/2019 Previously described small hypodense lesions are more conspicuous on the current study compared to the recent study throughout the liver from 8/20/2019, however similar to the prior study from 2/21/2019. Findings may be related to differences in contrast opacification. No abdominal or pelvic lymphadenopathy. Continue FOLFIRI + Avastin and repeat scans in 2 months to f/u on liver lesions. Cycle # 81 FOLFIRI + Avastin was on 01/07/2020. CEA 3.8 on 01/06/2020. Cycle # 82 FOLFIRI + Avastin was on 01/21/2020. CEA 3.7 on 01/20/2020. Cycle # 83 FOLFIRI + Avastin was on 02/04/2020. CEA 4.1 on 02/03/2020. Cycle # 84 FOLFIRI + Avastin was on 02/18/2020. CEA 4.6 on 02/17/2020. CT chest 2/28/2020 no evidence of metastatic disease to the lungs and no mediastinal or hilar adenopathy. CT abdomen pelvis 2/28/2020 no enhancing lesions seen within the liver to suggest metastatic disease. Wall thickening of the rectosigmoid junction. Images reviewed. Continue FOLFIRI Avastin and repeat scans in 3 months  EGD by Dr. Jacqueline Varela 03/02/2020 showing no masses or lesions. Cycle # 85 FOLFIRI + Avastin was on 03/03/2020. CEA 7.4 on 03/02/2020. Cycle # 86 FOLFIRI + Avastin was on 03/17/2020. CEA 4.5 on 03/16/2020. Colonoscopy on 03/23/2020 by Dr. Jacqueline aguero (records requested). Cycle # 87 FOLFIRI + Avastin was on 03/31/2020. CEA 4.1 on 03/30/2020. Cycle # 88 FOLFIRI + Avastin was on 04/21/2020. CEA 6.4 on 04/20/2020. Cycle # 89 FOLFIRI + Avastin was on 05/05/2020. CEA 5.8 on 05/04/2020. Cycle # 90 FOLFIRI + Avastin was on 06/02/2020. CEA 5.5 on 06/01/2020. Cycle # 91 FOLFIRI + Avastin was on 06/16/2020. CEA 5.6 on 06/15/2020. CT chest 06/23/2020 noted no metastatic disease in the chest.   CT abdomen/pelvis 06/23/2020 Stable small liver lesions measuring up to 5 mm since 2019.   22 mm round mass in segment 8 of the liver with central high density stable from December 2019), previously measuring up to 34 mm in 2017. No additional metastatic disease in the abdomen or pelvis. Small amount of ascites. Overall stable disease. Continue FOLFIRI + Avastin and repeat scans in 2-3 months. Cycle # 92 FOLFIRI + Avastin was on 07/07/2020. CEA 5.7 on 07/06/2020. Cycle # 93 FOLFIRI + Avastin was on 07/21/2020. CEA 5.9 on 07/20/2020. Cycle # 94 FOLFIRI + Avastin was on 08/04/2020. CEA 5.5 on 08/04/2020. Cycle # 95 FOLFIRI + Avastin was on 08/25/2020. CEA 6.1 on 08/24/2020. CT chest 9/2/2020 stable unremarkable enhanced CT of the thorax. There is no metastatic disease to the lungs.   CT abdomen pelvis on 9/2/2020 stable 2.2 cm low attenuated lesion within the central aspect of the right hepatic lobe favored to represent treated metastatic disease. No new hepatic lesion is identified. Stable splenomegaly  There is no appreciable colonic lesion or stricture. Pericholecystic fluid of uncertain etiology. Laparoscopic cholecystectomy with normal cholangiogram 10/27/20  Gallbladder: Chronic cholecystitis and cholelithiasis. Unremarkable regional lymph node. 11/13/2020; Seen by Dr. Yessy Bates from General surgery team; cleared to re-start chemotherapy. Cycle # 96 FOLFIRI + Avastin was on 11/17/2020. CEA 3.6 on 11/16/2020. Cycle # 97 FOLFIRI + Avastin was on 12/01/2020. CEA 3.5 on 11/30/2020. Cycle # 98 FOLFIRI + Avastin was on 12/15/2020. CEA 4.3 on 12/15/2020. Cycle # 99 FOLFIRI + Avastin was on 01/05/2021. CEA 4.7 on 01/04/2021. CT chest 01/13/2021 negative for metastatic disease. CT abdomen/pelvis 01/13/2021 Unchanged central hepatic lesion. No specific signs of abdominopelvic metastatic disease. Continue FOLFIRI + Avastin and repeat scans in 3 months. Cycle # 100 FOLFIRI + Avastin was on 01/19/2021. CEA 4.7 on 01/18/2021. Cycle # 101 FOLFIRI + Avastin was on 02/02/2021. CEA 5.2 on 02/01/2021. Cycle # 102 FOLFIRI + Avastin was on 02/16/2021. CEA 5.6 on 02/15/2021. Cycle # 103 FOLFIRI + Avastin was on 03/02/2021. Cycle # 104 FOLFIRI (20% dose reduced due to significant toxicity) + Avastin was on 03/16/2021. Cycle # 105 FOLFIRI (same dose as cycle # 104) + Avastin was on 03/30/2021. CEA 6.5 on 03/29/2021. Cycle # 106 FOLFIRI (same dose as cycle # 104) + Avastin was on 04/13/2021. CEA 6.0 on 04/12/2021  CT chest 04/20/2021 no evidence of metastatic disease. CT abdomen/pelvis 04/20/2021 No evidence of progressive metastatic disease. Stable 2 cm lesion in the right lobe of the liver, likely representing treated metastatic disease. Imaging reviewed.  Continue FOLFIRI + Avastin and repeat scans in 3 months  Cycle # 107 FOLFIRI + Avastin was on 04/27/2021  Cycle # 108 FOLFIRI + Avastin was on 05/11/2021. CEA 6.4 on 05/10/2021. Cycle # 109 FOLFIRI (held Avastin due to upcoming surgery) on 05/25/2021. CEA 6.3 on 5/24/21  Cycle # 110 FOLFIRI (held Avastin due to upcoming surgery) on 06/08/2021. CEA 6.3 on 6/07/21. CEA 5.8 on 06/28/2021. Right inguinal/scrotol hernia repair on 07/13/2021 with Dr Walt Read with folely removal on 07/23/2021. He developed fever on 07/24/2021 and was brought to [address] [place], [place] [address] ER via EMS; Sepsis secondary to urinary tract infection; Completed Abx  CT chest 08/13/2021 no sign of recurrent or metastatic disease. CT abdomen/pelvis 08/13/2021 unchanged hepatic lesions. Splenomegaly and secondary signs of portal venous hypertension  CEA 4.0 on 08/16/2021  CEA 4.1 on 08/30/2021  Cycle # 111 FOLFIRI was on 08/31/2021. HBP team for evaluation of liver lesions; His lesion does appear to be a hemangioma as his prior metastatic deposits in his liver were hypo attenuating compared to this CT which is hyper attenuating. compared to his prior CT scans he has disappearing liver metastasis. MRI liver on 09/17/2021 No suspicious liver lesion identified. Colonoscopy GI Dr. Wynetta Holter on 09/20/2021 unremarkable  CEA 3.3 on 09/27/2021. CEA 3.2 on 10/25/2021. CT chest 11/08/2021: Similar chronic appearing findings. No acute process or evidence of metastatic disease identified. CT Abdomen/pelvis 11/08/2021: No significant change in the appearance of the liver. Similar appearance of splenomegaly. No interval change. CEA 2.7 on 11/22/2021. CEA 2.5 on 12/27/2021  CEA 2.4 on 01/24/2022  CT chest/abdomen/pelvis 02/04/2022 Stable CT of the chest abdomen/ pelvis with the a 1.4 cm enhancing nodule in the right hepatic lobe which is marginally improved. Imaging reviewed.   CEA 2.3 on 02/21/2022  CEA 2.4 on 03/21/2022  CEA 2.2 on 04/18/2022  CT chest abdomen pelvis 5/11/2022: progressive hepatic metastasis with increasing number and prominence of the hepatic lesions  Soft tissue density in the ileocecal valve probably fecal matter. CEA 2.2 on 05/16/2022  CEA 2.3 on 06/06/2022  PET/CT scan 06/06/2022 the recently described hepatic lesions on CT are not significantly hypermetabolic on PET relative to hepatic background activity. No generalized pattern of FDG avid metastatic disease  Imaging reviewed. HBP noted on 06/14/2022 reviewed; No evidence of any recurrence on repeat imaging. Evaluated by Dr. Giuseppe Alatorre on 06/20/2022  CEA 2.4 on 07/02/2022  Colonoscopy on July 11, 2022 noted colon polyp x1 in the descending colon removed by cold snare polypectomy  Mild diverticulosis of the sigmoid colon  Previous cancer site and spot tattoo near the rectosigmoid junction with no gross recurrence in the lumen of the colon with biopsies of the mucosa taken  Small internal and external hemorrhoids  A. Descending colon polyp polypectomy: Tubular adenoma  B. Previous cancer SPOT biopsy:   Colonic mucosa with no significant pathologic changes  Negative for malignancy  Recommend follow-up colonoscopy in 2 years for surveillance  Operative note reviewed. Pathology reviewed. CEA 2.3 on 08/01/2022  Labs reviewed. BUN24/Creat 1.4; recommended to increase p.o. fluid intake and follow-up with PCP for further recommendations. RTC 1 month with labs. Scans after next visit. MSI testing noted no mismatch repair protein loss of expression  NGS testing ([employer])   MS-Stable: No therapies or clinical trials  TMB 3Muts/Mb: No therapies or clinical trials  KRAS G12R: Therapies with clinical relevance in patient's tumor type: Cetuximab, Panitumumab  Therapies with clinical relevance in other tumor type: None  NRAS wild type:  Therapies with clinical relevance in patient's tumor type: Cetuximab, Panitumumab  Therapies with clinical relevance in other tumor type: None  APC: None None  MARIELA: None None  Pathology reviewed.     8/2/2022  Maddi Boyd MD  Board Certified Medical Oncologist

## 2022-08-02 NOTE — PROGRESS NOTES
PORT FLUSH    Patient presents to clinic for [facility] today. Left chest  SQ port accessed per policy using [id number] [id number]  Campos needle for good blood return. Aspirate for waste and specimen sent to lab. Site flushed easily with 10 mL NSS followed by 5 mL Heparin solution 100 units/ml rinse prior to de-access. Dry sterile dressing to area. Tolerated procedure well. Encouraged to schedule port flush every 4 weeks.

## 2022-08-29 ENCOUNTER — HOSPITAL ENCOUNTER (OUTPATIENT)
Age: 72
Discharge: HOME | End: 2022-08-29
Payer: MEDICARE

## 2022-08-29 DIAGNOSIS — C78.7 RECTAL CANCER METASTASIZED TO LIVER (HCC): ICD-10-CM

## 2022-08-29 DIAGNOSIS — C20 RECTAL CANCER METASTASIZED TO LIVER (HCC): ICD-10-CM

## 2022-08-29 LAB
ALBUMIN SERPL-MCNC: 4.3 G/DL (ref 3.5–5.2)
ALP BLD-CCNC: 127 U/L (ref 40–129)
ALT SERPL-CCNC: 12 U/L (ref 0–40)
ANION GAP SERPL CALCULATED.3IONS-SCNC: 10 MMOL/L (ref 7–16)
AST SERPL-CCNC: 18 U/L (ref 0–39)
BASOPHILS ABSOLUTE: 0.02 E9/L (ref 0–0.2)
BASOPHILS RELATIVE PERCENT: 0.4 % (ref 0–2)
BILIRUB SERPL-MCNC: 0.7 MG/DL (ref 0–1.2)
BUN BLDV-MCNC: 21 MG/DL (ref 6–23)
CALCIUM SERPL-MCNC: 10.2 MG/DL (ref 8.6–10.2)
CEA: 2.1 NG/ML (ref 0–5.2)
CHLORIDE BLD-SCNC: 103 MMOL/L (ref 98–107)
CO2: 25 MMOL/L (ref 22–29)
CREAT SERPL-MCNC: 1.3 MG/DL (ref 0.7–1.2)
EOSINOPHILS ABSOLUTE: 0.11 E9/L (ref 0.05–0.5)
EOSINOPHILS RELATIVE PERCENT: 2.4 % (ref 0–6)
GFR AFRICAN AMERICAN: >60
GFR NON-AFRICAN AMERICAN: 54 ML/MIN/1.73
GLUCOSE BLD-MCNC: 110 MG/DL (ref 74–99)
HCT VFR BLD CALC: 33.3 % (ref 37–54)
HEMOGLOBIN: 11.1 G/DL (ref 12.5–16.5)
IMMATURE GRANULOCYTES #: 0.02 E9/L
IMMATURE GRANULOCYTES %: 0.4 % (ref 0–5)
LYMPHOCYTES ABSOLUTE: 0.67 E9/L (ref 1.5–4)
LYMPHOCYTES RELATIVE PERCENT: 14.7 % (ref 20–42)
MCH RBC QN AUTO: 30.2 PG (ref 26–35)
MCHC RBC AUTO-ENTMCNC: 33.3 % (ref 32–34.5)
MCV RBC AUTO: 90.5 FL (ref 80–99.9)
MONOCYTES ABSOLUTE: 0.34 E9/L (ref 0.1–0.95)
MONOCYTES RELATIVE PERCENT: 7.5 % (ref 2–12)
NEUTROPHILS ABSOLUTE: 3.4 E9/L (ref 1.8–7.3)
NEUTROPHILS RELATIVE PERCENT: 74.6 % (ref 43–80)
PDW BLD-RTO: 13.5 FL (ref 11.5–15)
PLATELET # BLD: 119 E9/L (ref 130–450)
PMV BLD AUTO: 9.9 FL (ref 7–12)
POTASSIUM SERPL-SCNC: 4 MMOL/L (ref 3.5–5)
RBC # BLD: 3.68 E12/L (ref 3.8–5.8)
SODIUM BLD-SCNC: 138 MMOL/L (ref 132–146)
TOTAL PROTEIN: 7.7 G/DL (ref 6.4–8.3)
WBC # BLD: 4.6 E9/L (ref 4.5–11.5)

## 2022-08-29 PROCEDURE — 82378 CARCINOEMBRYONIC ANTIGEN: CPT

## 2022-08-29 PROCEDURE — 85025 COMPLETE CBC W/AUTO DIFF WBC: CPT

## 2022-08-29 PROCEDURE — 80053 COMPREHEN METABOLIC PANEL: CPT

## 2022-08-29 PROCEDURE — 36415 COLL VENOUS BLD VENIPUNCTURE: CPT

## 2022-08-30 ENCOUNTER — HOSPITAL ENCOUNTER (OUTPATIENT)
Dept: INFUSION THERAPY | Age: 72
Discharge: HOME | End: 2022-08-30
Payer: MEDICARE

## 2022-08-30 ENCOUNTER — OFFICE VISIT (OUTPATIENT)
Dept: ONCOLOGY | Age: 72
End: 2022-08-30
Payer: MEDICARE

## 2022-08-30 VITALS
BODY MASS INDEX: 29.65 KG/M2 | TEMPERATURE: 98 F | DIASTOLIC BLOOD PRESSURE: 74 MMHG | HEART RATE: 59 BPM | SYSTOLIC BLOOD PRESSURE: 131 MMHG | HEIGHT: 73 IN | WEIGHT: 223.7 LBS | OXYGEN SATURATION: 97 %

## 2022-08-30 DIAGNOSIS — C20 RECTAL CANCER METASTASIZED TO LIVER (HCC): ICD-10-CM

## 2022-08-30 DIAGNOSIS — C78.7 RECTAL CANCER METASTASIZED TO LIVER (HCC): ICD-10-CM

## 2022-08-30 DIAGNOSIS — C78.7 RECTAL CANCER METASTASIZED TO LIVER (HCC): Primary | ICD-10-CM

## 2022-08-30 DIAGNOSIS — C20 RECTAL CANCER (HCC): Primary | ICD-10-CM

## 2022-08-30 DIAGNOSIS — C20 RECTAL CANCER METASTASIZED TO LIVER (HCC): Primary | ICD-10-CM

## 2022-08-30 PROCEDURE — 1123F ACP DISCUSS/DSCN MKR DOCD: CPT | Performed by: INTERNAL MEDICINE

## 2022-08-30 PROCEDURE — 99214 OFFICE O/P EST MOD 30 MIN: CPT

## 2022-08-30 PROCEDURE — 6360000002 HC RX W HCPCS: Performed by: INTERNAL MEDICINE

## 2022-08-30 PROCEDURE — 96523 IRRIG DRUG DELIVERY DEVICE: CPT

## 2022-08-30 PROCEDURE — 99214 OFFICE O/P EST MOD 30 MIN: CPT | Performed by: INTERNAL MEDICINE

## 2022-08-30 PROCEDURE — 2580000003 HC RX 258: Performed by: INTERNAL MEDICINE

## 2022-08-30 RX ORDER — HEPARIN SODIUM (PORCINE) LOCK FLUSH IV SOLN 100 UNIT/ML 100 UNIT/ML
500 SOLUTION INTRAVENOUS PRN
Status: DISCONTINUED | OUTPATIENT
Start: 2022-08-30 | End: 2022-08-31 | Stop reason: HOSPADM

## 2022-08-30 RX ORDER — SODIUM CHLORIDE 0.9 % (FLUSH) 0.9 %
10 SYRINGE (ML) INJECTION PRN
Status: CANCELLED | OUTPATIENT
Start: 2022-08-30

## 2022-08-30 RX ORDER — SODIUM CHLORIDE 0.9 % (FLUSH) 0.9 %
10 SYRINGE (ML) INJECTION PRN
Status: DISCONTINUED | OUTPATIENT
Start: 2022-08-30 | End: 2022-08-31 | Stop reason: HOSPADM

## 2022-08-30 RX ORDER — HEPARIN SODIUM (PORCINE) LOCK FLUSH IV SOLN 100 UNIT/ML 100 UNIT/ML
500 SOLUTION INTRAVENOUS PRN
Status: CANCELLED | OUTPATIENT
Start: 2022-08-30

## 2022-08-30 RX ADMIN — SODIUM CHLORIDE, PRESERVATIVE FREE 10 ML: 5 INJECTION INTRAVENOUS at 09:04

## 2022-08-30 RX ADMIN — Medication 500 UNITS: at 09:04

## 2022-08-30 NOTE — PROGRESS NOTES
PORT FLUSH    Patient presents to clinic for [facility] today. Left chest  SQ port accessed per policy using [id number] .[id number]  Campos needle for good blood return. Aspirate for waste and specimen sent to lab. Site flushed easily with 10 mL NSS followed by 5 mL Heparin solution 100 units/ml rinse prior to de-access. Dry sterile dressing to area. Tolerated procedure well. Encouraged to schedule port flush every 4 weeks.

## 2022-08-30 NOTE — PROGRESS NOTES
[place] [id number]           Attending Clinic Note     Reason for Visit: Follow-up on a patient with Metastatic Rectal Cancer. PCP: Yamini Turpin MD     History of Present Illness:  68 y/o  male who was referred to see Dr. Katarzyna Campbell (GI team) for evaluation of bright red blood per rectum, mild anemia and change in bowel habits with diarrhea. CEA 2640 on 10/19/2015. AlcP 299 AST 57 ALT 75 on 10/19/2015. Colonoscopy in 2013 noted no significant polyps, colitis or lesions at that time. Denies any Family History of colorectal cancer or polyps. Colonoscopy on 10/19/2015 revealed:  1. Ascending polyp, 8 mm, hot snare: Tubulovillous adenoma. 2. Transverse polyp, 1 cm, hot snare: Serrated polyp most consistent with sessile serrated adenoma. 3. Five splenic flexure polyps, three - 5 mm, 7 mm, 8 mm, hot snare and biopsy: Four segments of Tubular Adenoma  4. Descending polyp, 5 mm, biopsy: Tubular adenoma. 5. Sigmoid polyp, 4 mm biopsy, Serrated polyp most consistent with sessile serrated adenoma. 6. Two rectal polyps, 4 mm biopsy: Serrated polyp most consistent with hyperplastic polyp. 7. Rectosigmoid colon mass (large mass approximately 65% circumference of the lumen; Very friable, firm and hard): Tubulovillous adenoma with associated focal erosion and fibroplasia. CT scan abdomen/pelvis on 10/26/2015:  1. Small nodules at lung bases likely represent metastatic colon   cancer. 2. Extensive likely metastatic colon cancer throughout the liver. 3. Mild mural thickening and luminal narrowing in the   terminal ileum, otherwise nonspecific. Colonoscopy with snare removal rectal mass was performed by Dr. Brandi Roper. Pathology proved:  Rectal polyp: Invasive adenocarcinoma involving villous adenoma and extending to the cauterized edge of excision. KRAS Mutation: Mutation detected.   BRAF Mutation: Mutation not detected. 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 CEA 3488. Bone scan on 11/10/2015 noted no metastatic disease. CT chest on 11/10/2015 revealed Multiple pulmonary nodules in the upper and lower lobes consistent with metastatic disease;   Hepatic metastasis also visualized. 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 Mediport was placed. Cycle # 1 of FOLFOX + Avastin was on 11/23/2015. CEA was 4555 on 11/23/2015. Cycle # 2 of FOLFOX + Avastin was on 12/08/2015. CEA was 3160 on 12/08/2015. Cycle # 3 of FOLFOX + Avastin was on 12/22/2015. CEA was 2516 on 12/21/2015. Cycle # 4 of FOLFOX + Avastin was on 01/05/2016. CEA was 2098 on 01/05/2015. Cycle # 5 of FOLFOX + Avastin was on 01/19/2016. CEA was 1511 on 01/05/2015.     -Bone scan on 01/26/2016 noted no metastatic disease. CT chest on 01/26/2016 revealed Significant response to treatment with no visible residual nodules. CT scan abdomen/pelvis on 01/26/2016 noted Interval decreased size of multiple masses in the liver compatible with treatment response. Continue another 2 months of FOLFOX + Avastin and repeat scans. Cycle # 6 of FOLFOX + Avastin was on 02/02/2016.  on 02/02/2016. Cycle # 7 of FOLFOX + Avastin was on 02/16/2016.  on 02/16/2016. Cycle # 8 of FOLFOX + Avastin was on 03/01/2016.  on 03/01/2016. Cycle # 9 of FOLFOX + Avastin was on 03/15/2016.  on 03/15/2016. Cycle # 10 of FOLFOX + Avastin was on 03/29/2016. .4 on 03/29/2016. Cycle # 11 of FOLFOX + Avastin was on 04/12/2016. .4 on 04/12/2016. Re-staging scans on 04/19/2016: CT Chest: clear lungs; no evidence of recurring pulmonary nodule; CT Abdomen/Pelvis: Further interval decrease in size of the multiple metastatic hepatic lesions, the largest lesion now measures 3.9 x 3.5 cm and previously measured 4.5 cm in maximum diameter.  No mesenteric lymphadenopathy is identified; Bone Scan: No evidence of osseous metastasis. Continue same regimen and re-stage in 2-3 months. Cycle # 12 FOLFOX + Avastin was on 04/26/2016. .5 on 04/26/2016. Cycle # 13 FOLFOX + Avastin was on 05/10/2016. .3 on 05/10/2016. Cycle # 14 FOLFOX+AVASTIN was on 05/24/2016. .5 on 05/24/2016. Cycle # 15 FOLFOX + Avastin was on 06/07/2016. CEA 90.5 on 06/07/2016. Admitted to [facility] 06/13/2016-06/16/2016 for abdominal pain: EGD noted 1.5 cm clean based duodenal bulb ulceration s/p epinephrine and bicap per Dr. Lauran Pallas. No active bleeding. A. Stomach, biopsy: Mild chronic gastritis, immunostain negative for Helicobacter  B. Esophagus, biopsy: Gastric glandular mucosa with prominent intestinal metaplasia (Madrid's epithelium), negative for epithelial dysplasia, esophageal squamous mucosa not identified  Cycle # 16 FOLFOX (discontinued avastin (bevacizumab) given association of peptic ulcer disease and known association of GI perforation) was on 06/21/2016. Cycle # 17 FOLFOX was on 07/05/2016. CEA 52.6 on 07/19/2016. Cycle # 17 FOLFOX was on 07/05/2016. CEA 52.6 on 07/05/2016. CEA 47.6 on 07/19/2016. Re-staging scans 07/19/2106: CT Chest negative for metastatic disease. CT Abdomen/Pelvis: Stable hepatic lesions; Question new mural thickening in the cecum. Bone Scan: New Let hip lesion suspicious for bone metastasis. Increased CEA; new mural thickening in the cecum and new left hip lesion suspicious for bone metastasis are consistent with disease progression; He derived maximum benefit from FOLFOX/AVASTIN. D/C FOLFOX/AVASTIN. We recommended FOLFIRI second line therapy. Cycle # 1 FOLFIRI was on 08/02/2016. CEA 40.8 on 08/02/2016. Xgeva q4 weeks started on 08/02/2016. Cycle # 2 FOLFIRI was on 08/16/2016. CEA 31.7 on 08/16/2016. Cycle # 3 FOLFIRI (added Avastin) was on 08/30/2016 given that ulcers healed on EGD 08/15/2016 by Dr. Madina Gresham; Protonix bid since avastin re-started.    Colonoscopy on 08/29/2016 (to look at the cecal area) unremarkable. CEA 26.7 on 08/30/2016. Cycle # 4 FOLFIRI/Avastin was on 09/13/2016. CEA 23.4 on 09/13/2016. Cycle # 5 FOLFIRI/Avastin was on 09/27/2016. CEA 22.3 on 09/27/2016. Cycle # 6 FOLFIRI/Avastin was on 10/11/2016. CEA 18.4 on 10/11/2016. Bone scan 10/21/2016 stable bone metastasis; ? New lesion anterior left sixth rib likely interval healing fracture. CT abdomen/pelvis revealed stable hepatic metastasis. CT chest negative for metastatic disease. Continue FOLFIRI/Avastin and repeat scans in 3 months. Cycle # 7 FOLFIRI/Avastin was on 10/25/2016. CEA 16.1  Cycle # 8 FOLFIRI/Avastin was on 11/08/2016. CEA 15.7  Cycle # 9 FOLFIRI/Avastin was on 11/29/2016. CEA 13.6 on 11/29/2016. Cycle # 10 FOLFIRI/Avastin was on 12/13/2016. CEA 10.6 on 12/13/2016. Cycle # 11 FOLFIRI/Avastin was on 12/27/2016. CEA 11.1 on 12/27/2016. Cycle # 12 FOLFIRI/Avastin was on 01/10/2017. CEA 9.7 on 01/10/2017. CT chest 01/23/2017 No new pulmonary nodule or lymphadenopathy. Patchy groundglass opacities within the left lower lobe, suggestive of infectious or inflammatory etiology. Followup CT chest in 3 months. Slight increased linear sclerosis along the left anterior sixth rib corresponding to an area of increased uptake on the prior bone scan from 10/21/2016, favored to be related to interval healing changes of a nondisplaced fracture. CT abdomen/pelvis on 01/23/2017 Redemonstration of multiple hepatic metastases, which are similar compared to the prior CT from 10/21/2016. Redemonstration of area of increased sclerosis along the right acetabulum suspicious for metastatic disease. Bone scan on 01/23/2017 Stable subtle uptake within the left proximal diaphysis, again suggestive of metastatic disease  Decreased subtle uptake within the medial right acetabulum.    Decreased uptake within the left anterior sixth rib corresponding to linear sclerosis on the recent CT, favored to be related to an joint rib fracture. Continue FOLFIRI + Avastin and repeat scans in 3 months. Cycle # 13 FOLFIRI + Avastin was on 01/24/2017. Cycle # 14 FOLFIRI + Avastin was on 02/07/2017. Cycle # 15 FOLFIRI + Avastin was on 02/21/2017. Cycle # 16 FOLFIRI + Avastin was on 03/07/2017. Cycle # 17 FOLFIRI + Avastin was on 03/21/2017. Cycle # 18 FOLFIRI + Avastin was on 04/04/2017. CT chest 04/17/2017 negative for metastatic disease. CT abdomen/pelvis 04/17/2017 Stable hypodense metastatic lesions within the liver. Stable area of increased sclerosis along the right acetabulum  Bone scan 04/17/2017 Stable subtle uptake within the left proximal femoral diaphysis; No definite abnormal uptake in the region of a sclerotic lesion along the posterior column of the right acetabulum noted on CT. Stable slight uptake within the left anterior sixth rib corresponding to linear sclerosis on the recent CT, favored to be related to a fracture. Continue FOLFIRI + Avastin and repeat scans in 3 months. Cycle # 19 FOLFIRI + Avastin was on 04/18/2017. Cycle # 20 FOLFIRI + Avastin was on 05/02/2017. Cycle # 21 FOLFIRI + Avastin was on 05/16/2017. Cycle # 22 FOLFIRI + Avastin was on 05/30/2017. Cycle # 23 FOLFIRI + Avastin was on 06/13/2017. Cycle # 24 FOLFIRI + Avastin was on 06/27/2017. Cycle # 25 FOLFIRI + Avastin was on 07/11/2017. Cycle # 26 FOLFIRI + Avastin was on 07/25/2017. Cycle # 27 FOLFIRI + Avastin was on 08/08/2017. Cycle # 28 FOLFIRI + Avastin was on 08/22/2017. Cycle # 29 FOLFIRI + Avastin was on 09/05/2017. Cycle # 30 FOLFIRI + Avastin was on 09/19/2017. Bone scan 09/26/2017 stable. CT abdomen/pelvis on 09/26/2017 Stable hypodense mass in the liver. Stable sclerotic lesion in the acetabulum. CT chest 09/26/2017 negative for metastatic disease. Cycle # 31 FOLFIRI + Avastin was on 10/03/2017. CEA 7.4 on 10/03/2017. Cycle # 32 FOLFIRI + Avastin was on 10/17/2017.  CEA 6.0 on 10/17/2017. Cycle # 33 FOLFIRI + Avastin was on 10/31/2017. CEA 6.8 on 10/31/2017. Cycle # 34 FOLFIRI + Avastin was on 11/14/2017. CEA 7.2 on 11/14/2017. Cycle # 35 FOLFIRI + Avastin was on 11/28/2017. CEA 5.1 on 11/28/2017. Cycle # 36 FOLFIRI + Avastin was on 12/12/2017. CEA 6.1 on 12/12/2017. Bone scan 12/22/2017 noted no evidence of metastatic disease to the axial or appendicular skeleton. CT chest 12/22/2017 noted no evidence of metastatic disease. CT abdomen/pelvis 12/22/2017 noted stable hypodense lesions in the liver. Continue FOLFIRI + Avastin and repeat scans in 3 months. Cycle # 37 FOLFIRI + Avastin was on 12/27/2018. Cycle # 38 FOLFIRI + Avastin was on 01/09/2018. Cycle # 39 FOLFIRI + Avastin was on 01/23/2018. Cycle # 40 FOLFIRI + Avastin was on 02/06/2018. Cycle # 41 FOLFIRI + Avastin was on 02/20/2018. Cycle # 42 FOLFIRI + Avastin was on 03/06/2018. Cycle # 43 FOLFIRI + Avastin was on 03/20/2018. Bone scan on 03/26/2018 negative for metastatic disease. CT chest 03/26/2018 noted ? new 7 mm nodule in LUCY. CT abdomen/pelvis 03/26/2018 noted stable hypodense lesions in the liver. ? New small ascites in the abdomen. Patient wants to continue to monitor the lung finding and repeat scans in 2 months instead of changing the current regimen into oral Lonsurf. Cycle # 44 FOLFIRI + Avastin was on 04/03/2018. CEA 6.3 on 04/03/2018. Cycle # 45 FOLFIRI + Avastin was on 04/24/2018. CEA 5.3 on 04/24/2018. Cycle # 46 FOLFIRI + Avastin was on 05/08/2018. CEA 5.4 on 05/08/2018. Cycle # 47 FOLFIRI + Avastin was on 05/22/2018. CEA 6.1 on 05/22/2018. CT chest 05/31/2018 noted stable nodule in LUCY. No evidence of worsening malignancy. CT abdomen/pelvis on 05/31/2018 noted stable liver metastasis. No evidence of worsening malignancy. Continue FOLFIRI + Avastin and repeat scans in 3 months. Cycle # 48 FOLFIRI + Avastin was on 06/06/2018. Cycle # 49 FOLFIRI + Avastin was on 06/19/2018. 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 Ordered MRI thoracic spine and referred to neurosurgery team.    Cycle # 64 FOLFIRI + Avastin was on 02/26/2019. CEA 4.7 on 02/25/2019. Cycle # 65 FOLFIRI + Avastin was on 03/12/2019. CEA 4.0 on 03/11/2019. Cycle # 66 FOLFIRI + Avastin was on 03/26/2019. CEA 4.7 on 03/25/2019. Cycle # 67 FOLFIRI + Avastin was on 04/09/2019. CEA 5.6 on 04/08/2019. Cycle # 68 FOLFIRI + Avastin was on 04/30/2019. CEA 4.9 on 04/29/2019. Cycle # 69 FOLFIRI + Avastin was on 05/14/2019. CEA 5.3 on 05/14/2019. CT chest 05/23/2019 stable small lung nodule with no evidence of metastatic disease. CT abdomen/pelvis 05/23/2019 Decrease conspicuity of the liver lesions. No new lesions seen. Stable splenomegaly. Continue FOLFIRI + Avastin and repeat scans in 3 months. Cycle # 70 FOLFIRI + Avastin was on 06/04/2019. CEA 4.8 on 06/03/2019. Cycle # 71 FOLFIRI + Avastin was on 06/18/2019. CEA 4.6 on 06/17/2019. Cycle # 72 FOLFIRI + Avastin was on 07/09/2019. CEA 4.3 on 07/08/2019. Cycle # 73 FOLFIRI + Avastin was on 07/30/2019. CEA 5.0 on 07/29/2019. Cycle # 74 FOLFIRI + Avastin was on 08/13/2019. CEA 5.0 on 08/12/2019. CT chest 08/20/2019 negative  CT abdomen/pelvis 08/20/2019 Previous identified hepatic lesions are not visualized on the current exam.  Continue FOLFIRI + Avastin and repeat scans in 3 months. Cycle # 75 FOLFIRI + Avastin was on 08/27/2019. CEA 5.0 on 08/26/2019. Cycle # 76 FOLFIRI + Avastin was on 09/17/2019. CEA 5.5 on 09/16/2019. Cycle # 77 FOLFIRI + Avastin was on 10/15/2019. CEA 4.6 on 10/15/2019. Cycle # 78 FOLFIRI + Avastin was on 10/29/2019. CEA 4.1 on 10/28/2019. Cycle # 79 FOLFIRI + Avastin was on 11/12/2019. CEA 4.3 on 11/12/2019. Cycle # 80 FOLFIRI + Avastin was on 12/10/2019. CEA 4.0 on 12/09/2019.   CT chest 12/19/2019 Stable 3 mm nodule within the left upper lobe, similar to the previous studies dating back to 11/2/2018, however decreased in size compared to the CT from 3/26/2018. No thoracic LN. CT abdomen/pelvis 12/19/2019 Previously described small hypodense lesions are more conspicuous on the current study compared to the recent study throughout the liver from 8/20/2019, however similar to the prior study from 2/21/2019. Findings may be related to differences in contrast opacification. No abdominal or pelvic lymphadenopathy. Continue FOLFIRI + Avastin and repeat scans in 2 months to f/u on liver lesions. Cycle # 81 FOLFIRI + Avastin was on 01/07/2020. CEA 3.8 on 01/06/2020. Cycle # 82 FOLFIRI + Avastin was on 01/21/2020. CEA 3.7 on 01/20/2020. Cycle # 83 FOLFIRI + Avastin was on 02/04/2020. CEA 4.1 on 02/03/2020. Cycle # 84 FOLFIRI + Avastin was on 02/18/2020. CEA 4.6 on 02/17/2020. EGD by Dr. Della Bains 03/02/2020 showing no masses or lesions  Cycle # 85 FOLFIRI + Avastin was on 03/03/2020. CEA 7.4 on 03/02/2020. Cycle # 86 FOLFIRI + Avastin was on 03/17/2020. CEA 4.5 on 03/16/2020. Colonoscopy on 03/23/2020 by Dr. Della aguero (records requested). Cycle # 87 FOLFIRI + Avastin was on 03/31/2020. CEA 4.1 on 03/30/2020. Cycle # 88 FOLFIRI + Avastin was on 04/21/2020. CEA 6.4 on 04/20/2020. Cycle # 89 FOLFIRI + Avastin was on 05/05/2020. CEA 5.8 on 05/04/2020. Cycle # 90 FOLFIRI + Avastin was on 06/02/2020. CEA 5.5 on 06/01/2020. Cycle # 91 FOLFIRI + Avastin was on 06/16/2020. CEA 5.6 on 06/15/2020. CT chest 06/23/2020 noted no metastatic disease in the chest.   CT abdomen/pelvis 06/23/2020 Stable small liver lesions measuring up to 5 mm since 2019.   22 mm round mass in segment 8 of the liver with central high density stable from December 2019), previously measuring up to 34 mm in 2017. No additional metastatic disease in the abdomen or pelvis. Small amount of ascites. Overall stable disease. Continue FOLFIRI + Avastin and repeat scans in 2-3 months. Cycle # 92 FOLFIRI + Avastin was on 07/07/2020.  CEA 5.7 on 07/06/2020. Cycle # 93 FOLFIRI + Avastin was on 07/21/2020. CEA 5.9 on 07/20/2020. Cycle # 94 FOLFIRI + Avastin was on 08/04/2020. CEA 5.5 on 08/04/2020. Cycle # 95 FOLFIRI + Avastin was on 08/25/2020. CEA 6.1 on 08/24/2020. CT chest 9/2/2020 stable unremarkable enhanced CT of the thorax. There is no metastatic disease to the lungs. CT abdomen pelvis on 9/2/2020 stable 2.2 cm low attenuated lesion within the central aspect of the right hepatic lobe favored to represent treated metastatic disease. No new hepatic lesion is identified. Stable splenomegaly  There is no appreciable colonic lesion or stricture  Pericholecystic fluid of uncertain etiology. General surgery team consulted. Laparoscopic cholecystectomy with normal cholangiogram 10/27/20  Gallbladder: Chronic cholecystitis and cholelithiasis. Unremarkable regional lymph node. 11/13/2020; Seen by Dr. Knight from General surgery team; cleared to re-start chemotherapy. Cycle # 96 FOLFIRI + Avastin was on 11/17/2020. CEA 3.6 on 11/16/2020. Cycle # 97 FOLFIRI + Avastin was on 12/01/2020. CEA 3.5 on 11/30/2020. Cycle # 98 FOLFIRI + Avastin was on 12/15/2020. CEA 4.3 on 12/15/2020. Cycle # 99 FOLFIRI + Avastin was on 01/05/2021. CEA 4.7 on 01/04/2021. CT chest 01/13/2021 negative for metastatic disease. CT abdomen/pelvis 01/13/2021 Unchanged central hepatic lesion. No specific signs of abdominopelvic metastatic disease. Continue FOLFIRI + Avastin and repeat scans in 3 months. Cycle # 100 FOLFIRI + Avastin was on 01/19/2021. CEA 4.7 on 01/18/2021. Cycle # 101 FOLFIRI + Avastin was on 02/02/2021. CEA 5.2 on 02/01/2021. Cycle # 102 FOLFIRI + Avastin was on 02/16/2021. CEA 5.6 on 02/15/2021. Cycle # 103 FOLFIRI + Avastin was on 03/02/2021. Cycle # 104 FOLFIRI (20% dose reduced due to significant toxicity) + Avastin was on 03/16/2021. Cycle # 105 FOLFIRI (same dose as cycle # 104) + Avastin was on 03/30/2021.  CEA 6.5 on 03/29/2021. Cycle # 106 FOLFIRI (same dose as cycle # 104) + Avastin was on 04/13/2021. CEA 6.0 on 04/12/2021  CT chest 04/20/2021 no evidence of metastatic disease. CT abdomen/pelvis 04/20/2021 No evidence of progressive metastatic disease. Stable 2 cm lesion in the right lobe of the liver, likely representing treated metastatic disease. Imaging reviewed. Continue FOLFIRI + Avastin and repeat scans in 3 months  Cycle # 107 FOLFIRI + Avastin was on 04/27/2021  Cycle # 108 FOLFIRI + Avastin was on 05/11/2021. Cycle # 109 FOLFIRI (held Avastin due to upcoming surgery) on 05/25/2021. CEA 6.3 on 5/24/21  Cycle # 110 FOLFIRI (held Avastin due to upcoming surgery) on 06/08/2021. CEA 6.3 on 6/07/21. Right inguinal/scrotol hernia repair on 07/13/2021 with Dr Kareem Healy with folely removal on Friday 07/23/2021. He developed fever on 07/24/2021 and was brought to [address] [place], [place] [address] ER via EMS; Sepsis secondary to urinary tract infection   Completed Abx  CEA 4.1 on 08/30/2021  Cycle # 111 FOLFIRI was on 08/31/2021. HBP team for evaluation of liver lesions; His lesion does appear to be a hemangioma as his prior metastatic deposits in his liver were hypo attenuating compared to this CT which is hyper attenuating. compared to his prior CT scans he has disappearing liver metastasis. MRI liver on 09/17/2021 No suspicious liver lesion identified. Colonoscopy GI Dr. Mauricio Greer on 09/20/2021 unremarkable  CEA 3.3 on 09/27/2021. CEA 3.2 on 10/25/2021. CEA 2.7 on 11/22/2021. CEA 2.5 on 12/27/2021. CEA 2.4 on 01/24/2022. CEA 2.3 on 02/21/2022. CEA 2.4 on 03/21/2022. CEA 2.2 on 04/18/2022  CEA 2.2 on 05/16/2022  CEA 2.3 on 06/06/2022  HBP note 06/14/2022 reviewed. No evidence of recurrence on repeat imaging. CEA 2.4 on 07/02/2022  CEA 2.3 on 08/01/2022  CEA 2.1 on 08/29/2022  Today 08/30/2022 for f/u. No fever, chills. No nausea/vomiting.  Fair appetite and energy level. + arthritis    Review of Systems;  CONSTITUTIONAL: No fever, chills. Fair appetite and energy level  ENMT: Eyes: No diplopia; Nose: No epistaxis. Mouth:No lesions  RESPIRATORY: No hemoptysis, shortness of breath. CARDIOVASCULAR: No chest pain, palpitations. GASTROINTESTINAL:No N/V, abdominal pain. GENITOURINARY: No dysuria, urinary frequency, hematuria. MSK: + Arthritis   NEURO: No syncope, presyncope, headache. Remainder ROS: Negative. Past Medical History:   Past Medical History               Diagnosis Date    Arthritis      Cancer ([facility] [address]         colon    Depression      Hyperlipidemia      Hypertension           Medications:  Reviewed and reconciled. Allergies: Allergies   Allergen Reactions    Neosporin [Neomycin-Polymyxin-Gramicidin] Rash    Tape Natalya Joni Tape] Rash      Physical Exam:  /74   Pulse 59   Temp 98 °F (36.7 °C)   Ht 6' 1\" (1.854 m)   Wt 223 lb 11.2 oz (101.5 kg)   SpO2 97%   BMI 29.51 kg/m²   GENERAL: Alert, oriented x 3, not in distress  HEENT: PERRLA, EOMI. NECK: Supple. Without lymphadenopathy. LUNGS: CTA bilaterally, with no wheezing, crackles or rhonchi. CARDIOVASCULAR: Regular rhythm. No murmurs, rubs or gallops. ABDOMEN: Soft. Non-tender, non-distended. EXTREMITIES: Without clubbing, cyanosis  NEUROLOGIC: No focal deficits.    ECOG PS 1    Diagnostics:  Lab Results   Component Value Date    WBC 4.6 08/29/2022    HGB 11.1 (L) 08/29/2022    HCT 33.3 (L) 08/29/2022    MCV 90.5 08/29/2022     (L) 08/29/2022     Lab Results   Component Value Date     08/29/2022    K 4.0 08/29/2022     08/29/2022    CO2 25 08/29/2022    BUN 21 08/29/2022    CREATININE 1.3 (H) 08/29/2022    GLUCOSE 110 (H) 08/29/2022    CALCIUM 10.2 08/29/2022    PROT 7.7 08/29/2022    LABALBU 4.3 08/29/2022    BILITOT 0.7 08/29/2022    ALKPHOS 127 08/29/2022    AST 18 08/29/2022    ALT 12 08/29/2022    LABGLOM 54 08/29/2022    GFRAA >60 08/29/2022     Lab Results   Component Value Date    CEA 2.1 08/29/2022     Impression/Plan:  72 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 KRAS Mutation: Mutation detected. BRAF Mutation: Mutation not detected. 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 CT scan abdomen/pelvis on 10/26/2015 revealed small nodules at the lung bases, extensive liver lesions consistent with metastatic rectosigmoid cancer. CEA 3488 on 10/30/2015. Bone scan on 11/10/2015 noted no metastatic disease. CT chest on 11/10/2015 revealed Multiple pulmonary nodules in the upper and lower lobes consistent with metastatic disease; Hepatic metastasis also visualized. 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 Mediport was placed. Cycle # 1 of FOLFOX + Avastin was on 11/23/2015. CEA was 4555 on 11/23/2015. Cycle # 2 of FOLFOX + Avastin was on 12/08/2015. CEA was 3160 on 12/08/2015. Cycle # 3 of FOLFOX + Avastin was on 12/22/2015. CEA was 2516 on 12/21/2015. Cycle # 4 of FOLFOX + Avastin was on 01/05/2016. CEA was 2098 on 01/05/2015. Cycle # 5 of FOLFOX + Avastin was on 01/19/2016. CEA was 1511 on 01/05/2015.     -Bone scan on 01/26/2016 noted no metastatic disease. CT chest on 01/26/2016 revealed Significant response to treatment with no visible residual nodules. CT scan abdomen/pelvis on 01/26/2016 noted Interval decreased size of multiple masses in the liver compatible with treatment response. Continue another 2 months of FOLFOX + Avastin and repeat scans. Cycle # 6 of FOLFOX + Avastin was on 02/02/2016.  on 02/02/2016. Cycle # 7 of FOLFOX + Avastin was on 02/16/2016.  on 02/16/2016. Cycle # 8 of FOLFOX + Avastin was on 03/01/2016.  on 03/01/2016. Cycle # 9 of FOLFOX + Avastin was on 03/15/2016.  on 03/15/2016. Cycle # 10 of FOLFOX + Avastin was on 03/29/2016. .4 on 03/29/2016. Cycle # 11 of FOLFOX + Avastin was on 04/12/2016. .4 on 04/12/2016.      Re-staging scans on 04/19/2016: CT Chest: clear lungs; no evidence of recurring pulmonary nodule; CT Abdomen/Pelvis: Further interval decrease in size of the multiple metastatic hepatic lesions, the largest lesion now measures 3.9 x 3.5 cm and previously measured 4.5 cm in maximum diameter. No mesenteric lymphadenopathy is identified; Bone Scan: No evidence of osseous metastasis. Continue same regimen and re-stage in 2-3 months. Cycle # 12 FOLFOX + Avastin was on 04/26/2016. .5 on 04/26/2016. Cycle # 13 FOLFOX + Avastin was on 05/10/2016. .3 on 05/10/2016. Cycle # 14 FOLFOX+AVASTIN was on 05/24/2016. .5 on 05/24/2016. Cycle # 15 FOLFOX + Avastin was on 06/07/2016. CEA 90.5 on 06/07/2016. Admitted to [facility] 06/13/2016-06/16/2016 for abdominal pain: EGD noted 1.5 cm clean based duodenal bulb ulceration s/p epinephrine and bicap per Dr. Chase Marcano. No active bleeding. A. Stomach, biopsy: Mild chronic gastritis, immunostain negative for Helicobacter  B. Esophagus, biopsy: Gastric glandular mucosa with prominent ntestinal metaplasia (Madrid's epithelium), negative for epithelial dysplasia, esophageal squamous mucosa not identified     Cycle # 16 FOLFOX (discontinued avastin (bevacizumab) given association of peptic ulcer disease and known association of GI perforation.) was on 06/21/2016. CEA 47 on 06/21/2016. Cycle # 17 FOLFOX was on 07/05/2016. CEA 52.6 on 07/05/2016. CEA 47.6 on 07/19/2016. Re-staging scans 07/19/2106: CT Chest negative for metastatic disease. CT Abdomen/Pelvis: Stable hepatic lesions; Question new mural thickening in the cecum. Bone Scan: New Let hip lesion suspicious for bone metastasis. Increased CEA; new mural thickening in the cecum and new left hip lesion suspicious for bone metastasis are consistent with disease progression; He derived maximum benefit from FOLFOX/AVASTIN. D/C FOLFOX/AVASTIN. We recommended FOLFIRI second line therapy. Cycle # 1 FOLFIRI was on 08/02/2016. CEA 40.8 on 08/02/2016. Xgeva q4 weeks started on 08/02/2016. Cycle # 2 FOLFIRI was on 08/16/2016. CEA 31.7 on 08/16/2016. Cycle # 3 FOLFIRI (added Avastin) was on 08/30/2016 given that ulcers healed on EGD 08/15/2016 by Dr. Stanislaw Ritchie; Protonix bid since avastin re-started. Colonoscopy on 08/29/2016 (to look at the cecal area) unremarkable. CEA 26.7 on 08/30/2016. Cycle # 4 FOLFIRI/Avastin was on 09/13/2016. CEA 23.4 on 09/13/2016. Cycle # 5 FOLFIRI/Avastin was on 09/27/2016. CEA 22.3 on 09/27/2016. Cycle # 6 FOLFIRI/Avastin was on 10/11/2016. CEA 18.4 on 10/11/2016. Bone scan 10/21/2016 stable bone metastasis; ? New lesion anterior left sixth rib likely interval healing fracture. CT abdomen/pelvis revealed stable hepatic metastasis. CT chest negative for metastatic disease. Continue FOLFIRI/Avastin and repeat scans in 3 months. Cycle # 7 FOLFIRI/Avastin was on 10/25/2016. CEA 16.1 on 10/25/2016. Cycle # 8 FOLFIRI/Avastin was on 11/08/2016. CEA 15.7 on 11/08/2016. Cycle # 9 FOLFIRI/Avastin was on 11/29/2016. CEA 13.6 on 11/29/2016. Cycle # 10 FOLFIRI/Avastin was on 12/13/2016. CEA 10.6 on 12/13/2016. Cycle # 11 FOLFIRI/Avastin was on 12/27/2016. CEA 11.1 on 12/27/2016. Cycle # 12 FOLFIRI/Avastin was on 01/10/2017. CEA 9.7 on 01/10/2017. CT chest 01/23/2017 No new pulmonary nodule or lymphadenopathy. Patchy groundglass opacities within the left lower lobe, suggestive of infectious or inflammatory etiology. Followup CT chest in 3 months. Slight increased linear sclerosis along the left anterior sixth rib corresponding to an area of increased uptake on the prior bone scan from 10/21/2016, favored to be related to interval healing changes of a nondisplaced fracture. CT abdomen/pelvis on 01/23/2017 Redemonstration of multiple hepatic metastases, which are similar compared to the prior CT from 10/21/2016.  Redemonstration of area of increased sclerosis along the right acetabulum suspicious for metastatic lesions within the liver; CT chest without convincing evidence of metastatic disease. Cycle # 25 FOLFIRI + Avastin was on 07/11/2017. CEA 6.3 on 07/11/2017. Cycle # 26 FOLFIRI + Avastin was on 07/25/2017. CEA 7.3 on 07/25/2017. Cycle # 27 FOLFIRI + Avastin was on 08/08/2017. CEA 6.5 on 08/08/2017. Cycle # 28 FOLFIRI + Avastin was on 08/22/2017. CEA 5.9 on 08/22/2017. Cycle # 29 FOLFIRI + Avastin was on 09/05/2017. CEA 6.3 on 09/05/2017. Cycle # 30 FOLFIRI + Avastin was on 09/19/2017. CEA 6.3 on 09/19/2017. Bone scan 09/26/2017 stable. CT abdomen/pelvis on 09/26/2017 Stable hypodense mass in the liver. Stable sclerotic lesion in the acetabulum. CT chest 09/26/2017 negative for metastatic disease. Cycle # 31 FOLFIRI + Avastin was on 10/03/2017. CEA 7.4 on 10/03/2017. Cycle # 32 FOLFIRI + Avastin was on 10/17/2017. CEA 6.0 on 10/17/2017. Cycle # 33 FOLFIRI + Avastin was on 10/31/2017. CEA 6.8 on 10/31/2017. Cycle # 34 FOLFIRI + Avastin was on 11/14/2017. CEA 7.2 on 11/14/2017. Cycle # 35 FOLFIRI + Avastin was on 11/28/2017. CEA 5.1 on 11/28/2017. Cycle # 36 FOLFIRI + Avastin was on 12/12/2017. CEA 6.1 on 12/12/2017. Bone scan 12/22/2017 noted no evidence of metastatic disease to the axial or appendicular skeleton. CT chest 12/22/2017 noted no evidence of metastatic disease. CT abdomen/pelvis 12/22/2017 noted stable hypodense lesions in the liver. Continue FOLFIRI + Avastin and repeat scans in 3 months. Cycle # 37 FOLFIRI + Avastin was on 12/27/2018. CEA 6.7 on 12/27/2017. Cycle # 38 FOLFIRI + Avastin was on 01/09/2018. CEA 5.0 on 01/09/2018. Cycle # 39 FOLFIRI + Avastin was on 01/23/2018. CEA 6.5 on 01/23/2018. Cycle # 40 FOLFIRI + Avastin was on 02/06/2018. CEA 6.9 on 02/06/2018. Cycle # 41 FOLFIRI + Avastin was on 02/20/2018. CEA 6.4 on 02/20/2018. Cycle # 42 FOLFIRI + Avastin was on 03/06/2018. CEA 6.6 on 03/06/2018. Cycle # 43 FOLFIRI + Avastin was on 03/20/2018.  CEA 5.7 on 03/20/2018. Bone scan on 03/26/2018 negative for metastatic disease. CT chest 03/26/2018 noted ? new 7 mm nodule in LUCY. CT abdomen/pelvis 03/26/2018 noted stable hypodense lesions in the liver. ? New small ascites in the abdomen. Patient wants to continue to monitor the lung finding and repeat scans in 2 months instead of changing the current chemotherapy regimen to oral Lonsurf. Cycle # 44 FOLFIRI + Avastin was on 04/03/2018. CEA 6.3 on 04/03/2018. Cycle # 45 FOLFIRI + Avastin was on 04/24/2018. CEA 5.3 on 04/24/2018. Cycle # 46 FOLFIRI + Avastin was on 05/08/2018. CEA 5.4 on 05/08/2018. Cycle # 47 FOLFIRI + Avastin was on 05/22/2018. CEA 6.1 on 05/22/2018. CT chest 05/31/2018 noted stable nodule in LUCY. No evidence of worsening malignancy. CT abdomen/pelvis on 05/31/2018 noted stable liver metastasis. No evidence of worsening malignancy. Continue FOLFIRI + Avastin and repeat scans in 3 months. Cycle # 48 FOLFIRI + Avastin was on 06/06/2018. CEA 6.3 on 06/05/2018. Cycle # 49 FOLFIRI + Avastin was on 06/19/2018. CEA 6.1 on 06/19/2018. Cycle # 50 FOLFIRI + Avastin was on 07/03/2018. CEA 7.4 on 07/03/2018. Cycle # 51 FOLFIRI + Avastin was on 07/24/2018. CEA 6.7 on 07/24/2018. Cycle # 52 FOLFIRI + Avastin was on 08/14/2018. CEA 6.8 on 08/14/2018. Cycle # 53 FOLFIRI + Avastin was on 08/28/2018. CEA 6.5 on 08/27/2018. CT chest 09/06/2018 noted interval decrease in size of LUCY measuring up to 4 mm, suggestive of treatment response. No mediastinal or hilar LN  CT abdomen/pelvis 09/06/2018 Slight interval increase in size of a previously noted metastatic lesion within the right hepatic lobe. This may be related to differences in phase of enhancement compared to the most recent study from 5/31/2018, the lesion remains decreased in size compared to the more previous studies. No abdominal, retroperitoneal, or pelvic lymphadenopathy. Continue FOLFIRI + Avastin and repeat scans in 2 months. Cycle # 54 FOLFIRI + Avastin was on 09/11/2018. CEA 6.4 on 09/10/2018. Cycle # 55 FOLFIRI + Avastin was on 09/25/2018. CEA 6.4 on 09/25/2018. Cycle # 56 FOLFIRI + Avastin was on 10/09/2018. CEA 6.7 on 10/08/2018. Cycle # 57 FOLFIRI + Avastin was on 10/23/2018. CEA 7.2 on 10/22/2018. CT chest 11/02/2018 stable 3 mm nodule within LUCY. No new right and left lung nodule. No mediastinal or osseous lesion. CT abdomen/pelvis 11/02/2018 stable metastatic disease to liver. No new metastatic disease identified. No mesenteric or retroperitoneal LN. No gross colonic lesion identified. Continue FOLFIRI + Avastin and repeat scans in 3 months. Cycle # 58 FOLFIRI + Avastin was on 11/06/2018. CEA 7.6 on 11/05/2018. Cycle # 59 FOLFIRI + Avastin was on 11/27/2018. CEA 6.9 on 11/26/2018. Cycle # 60 FOLFIRI + Avastin was on 12/11/2018. CEA 6.7 on 12/11/2018. Cycle # 61 FOLFIRI + Avastin was on 01/08/2019. CEA 5.6 on 01/07/2019. Cycle # 62 FOLFIRI + Avastin was on 01/29/2019. CEA 4.6 on 01/28/2019. Cycle # 63 FOLFIRI + Avastin was on 02/12/2019. CEA 4.3 on 02/11/2019. CT chest 02/21/2019 no evidence of metastatic disease. CT abdomen/pelvis 02/21/2019 stable hypodense liver lesions. No evidence of worsening metastatic disease. 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 Ordered MRI thoracic spine and referred to neurosurgery team.  Cycle # 64 FOLFIRI + Avastin was on 02/26/2019. CEA 4.7 on 02/25/2019. Cycle # 65 FOLFIRI + Avastin was on 03/12/2019. CEA 4.0 on 03/11/2019. Cycle # 66 FOLFIRI + Avastin was on 03/26/2019. CEA 4.7 on 03/25/2019. Cycle # 67 FOLFIRI + Avastin was on 04/09/2019. CEA 5.6 on 04/08/2019. Cycle # 68 FOLFIRI + Avastin was on 04/30/2019. CEA 4.9 on 04/29/2019. Cycle # 69 FOLFIRI + Avastin was on 05/14/2019. CEA 5.3 on 05/14/2019. CT chest 05/23/2019 stable small lung nodule with no evidence of metastatic disease.    CT abdomen/pelvis 05/23/2019 Decrease conspicuity of the liver lesions. No new lesions seen. Stable splenomegaly. Continue FOLFIRI + Avastin and repeat scans in 3 months. Cycle # 70 FOLFIRI + Avastin was on 06/04/2019. CEA 4.8 on 06/03/2019. Cycle # 71 FOLFIRI + Avastin was on 06/18/2019. CEA 4.6 on 06/17/2019. Cycle # 72 FOLFIRI + Avastin was on 07/09/2019. CEA 4.3 on 07/08/2019. Cycle # 73 FOLFIRI + Avastin was on 07/30/2019. CEA 5.0 on 07/29/2019. Cycle # 74 FOLFIRI + Avastin was on 08/13/2019. CEA 5.0 on 08/12/2019. CT chest 08/20/2019 negative  CT abdomen/pelvis 08/20/2019 Previous identified hepatic lesions are not visualized on the current exam.  Continue FOLFIRI + Avastin and repeat scans in 3 months. Cycle # 75 FOLFIRI + Avastin was on 08/27/2019. CEA 5.0 on 08/26/2019. Cycle # 76 FOLFIRI + Avastin was on 09/17/2019. CEA 5.5 on 09/16/2019. Cycle # 77 FOLFIRI + Avastin was on 10/15/2019. CEA 4.6 on 10/15/2019. Cycle # 78 FOLFIRI + Avastin was on 10/29/2019. CEA 4.1 on 10/28/2019. Cycle # 79 FOLFIRI + Avastin was on 11/12/2019. CEA 4.3 on 11/12/2019. Cycle # 80 FOLFIRI + Avastin was on 12/10/2019. CEA 4.0 on 12/09/2019. CT chest 12/19/2019 Stable 3 mm nodule within the left upper lobe, similar to the previous studies dating back to 11/2/2018, however decreased in size compared to the CT from 3/26/2018. No thoracic LN. CT abdomen/pelvis 12/19/2019 Previously described small hypodense lesions are more conspicuous on the current study compared to the recent study throughout the liver from 8/20/2019, however similar to the prior study from 2/21/2019. Findings may be related to differences in contrast opacification. No abdominal or pelvic lymphadenopathy. Continue FOLFIRI + Avastin and repeat scans in 2 months to f/u on liver lesions. Cycle # 81 FOLFIRI + Avastin was on 01/07/2020. CEA 3.8 on 01/06/2020. Cycle # 82 FOLFIRI + Avastin was on 01/21/2020. CEA 3.7 on 01/20/2020. Cycle # 83 FOLFIRI + Avastin was on 02/04/2020. CEA 4.1 on 02/03/2020. Cycle # 84 FOLFIRI + Avastin was on 02/18/2020. CEA 4.6 on 02/17/2020. CT chest 2/28/2020 no evidence of metastatic disease to the lungs and no mediastinal or hilar adenopathy. CT abdomen pelvis 2/28/2020 no enhancing lesions seen within the liver to suggest metastatic disease. Wall thickening of the rectosigmoid junction. Images reviewed. Continue FOLFIRI Avastin and repeat scans in 3 months  EGD by Dr. Patricio Wilder 03/02/2020 showing no masses or lesions. Cycle # 85 FOLFIRI + Avastin was on 03/03/2020. CEA 7.4 on 03/02/2020. Cycle # 86 FOLFIRI + Avastin was on 03/17/2020. CEA 4.5 on 03/16/2020. Colonoscopy on 03/23/2020 by Dr. Patricio Wilder unremarkable (records requested). Cycle # 87 FOLFIRI + Avastin was on 03/31/2020. CEA 4.1 on 03/30/2020. Cycle # 88 FOLFIRI + Avastin was on 04/21/2020. CEA 6.4 on 04/20/2020. Cycle # 89 FOLFIRI + Avastin was on 05/05/2020. CEA 5.8 on 05/04/2020. Cycle # 90 FOLFIRI + Avastin was on 06/02/2020. CEA 5.5 on 06/01/2020. Cycle # 91 FOLFIRI + Avastin was on 06/16/2020. CEA 5.6 on 06/15/2020. CT chest 06/23/2020 noted no metastatic disease in the chest.   CT abdomen/pelvis 06/23/2020 Stable small liver lesions measuring up to 5 mm since 2019.   22 mm round mass in segment 8 of the liver with central high density stable from December 2019), previously measuring up to 34 mm in 2017. No additional metastatic disease in the abdomen or pelvis. Small amount of ascites. Overall stable disease. Continue FOLFIRI + Avastin and repeat scans in 2-3 months. Cycle # 92 FOLFIRI + Avastin was on 07/07/2020. CEA 5.7 on 07/06/2020. Cycle # 93 FOLFIRI + Avastin was on 07/21/2020. CEA 5.9 on 07/20/2020. Cycle # 94 FOLFIRI + Avastin was on 08/04/2020. CEA 5.5 on 08/04/2020. Cycle # 95 FOLFIRI + Avastin was on 08/25/2020. CEA 6.1 on 08/24/2020. CT chest 9/2/2020 stable unremarkable enhanced CT of the thorax. There is no metastatic disease to the lungs.   CT abdomen pelvis on 9/2/2020 stable 2.2 cm low attenuated lesion within the central aspect of the right hepatic lobe favored to represent treated metastatic disease. No new hepatic lesion is identified. Stable splenomegaly  There is no appreciable colonic lesion or stricture. Pericholecystic fluid of uncertain etiology. Laparoscopic cholecystectomy with normal cholangiogram 10/27/20  Gallbladder: Chronic cholecystitis and cholelithiasis. Unremarkable regional lymph node. 11/13/2020; Seen by Dr. Martha Vilchis from General surgery team; cleared to re-start chemotherapy. Cycle # 96 FOLFIRI + Avastin was on 11/17/2020. CEA 3.6 on 11/16/2020. Cycle # 97 FOLFIRI + Avastin was on 12/01/2020. CEA 3.5 on 11/30/2020. Cycle # 98 FOLFIRI + Avastin was on 12/15/2020. CEA 4.3 on 12/15/2020. Cycle # 99 FOLFIRI + Avastin was on 01/05/2021. CEA 4.7 on 01/04/2021. CT chest 01/13/2021 negative for metastatic disease. CT abdomen/pelvis 01/13/2021 Unchanged central hepatic lesion. No specific signs of abdominopelvic metastatic disease. Continue FOLFIRI + Avastin and repeat scans in 3 months. Cycle # 100 FOLFIRI + Avastin was on 01/19/2021. CEA 4.7 on 01/18/2021. Cycle # 101 FOLFIRI + Avastin was on 02/02/2021. CEA 5.2 on 02/01/2021. Cycle # 102 FOLFIRI + Avastin was on 02/16/2021. CEA 5.6 on 02/15/2021. Cycle # 103 FOLFIRI + Avastin was on 03/02/2021. Cycle # 104 FOLFIRI (20% dose reduced due to significant toxicity) + Avastin was on 03/16/2021. Cycle # 105 FOLFIRI (same dose as cycle # 104) + Avastin was on 03/30/2021. CEA 6.5 on 03/29/2021. Cycle # 106 FOLFIRI (same dose as cycle # 104) + Avastin was on 04/13/2021. CEA 6.0 on 04/12/2021  CT chest 04/20/2021 no evidence of metastatic disease. CT abdomen/pelvis 04/20/2021 No evidence of progressive metastatic disease. Stable 2 cm lesion in the right lobe of the liver, likely representing treated metastatic disease. Imaging reviewed.  Continue FOLFIRI + Avastin and repeat scans in 3 months  Cycle # 107 FOLFIRI + Avastin was on 04/27/2021  Cycle # 108 FOLFIRI + Avastin was on 05/11/2021. CEA 6.4 on 05/10/2021. Cycle # 109 FOLFIRI (held Avastin due to upcoming surgery) on 05/25/2021. CEA 6.3 on 5/24/21  Cycle # 110 FOLFIRI (held Avastin due to upcoming surgery) on 06/08/2021. CEA 6.3 on 6/07/21. CEA 5.8 on 06/28/2021. Right inguinal/scrotol hernia repair on 07/13/2021 with Dr Varinder Nava with folely removal on 07/23/2021. He developed fever on 07/24/2021 and was brought to [address] [place], [place] [address] ER via EMS; Sepsis secondary to urinary tract infection; Completed Abx  CT chest 08/13/2021 no sign of recurrent or metastatic disease. CT abdomen/pelvis 08/13/2021 unchanged hepatic lesions. Splenomegaly and secondary signs of portal venous hypertension  CEA 4.0 on 08/16/2021  CEA 4.1 on 08/30/2021  Cycle # 111 FOLFIRI was on 08/31/2021. HBP team for evaluation of liver lesions; His lesion does appear to be a hemangioma as his prior metastatic deposits in his liver were hypo attenuating compared to this CT which is hyper attenuating. compared to his prior CT scans he has disappearing liver metastasis. MRI liver on 09/17/2021 No suspicious liver lesion identified. Colonoscopy GI Dr. Lewis Tong on 09/20/2021 unremarkable  CEA 3.3 on 09/27/2021. CEA 3.2 on 10/25/2021. CT chest 11/08/2021: Similar chronic appearing findings. No acute process or evidence of metastatic disease identified. CT Abdomen/pelvis 11/08/2021: No significant change in the appearance of the liver. Similar appearance of splenomegaly. No interval change. CEA 2.7 on 11/22/2021. CEA 2.5 on 12/27/2021  CEA 2.4 on 01/24/2022  CT chest/abdomen/pelvis 02/04/2022 Stable CT of the chest abdomen/ pelvis with the a 1.4 cm enhancing nodule in the right hepatic lobe which is marginally improved. Imaging reviewed.   CEA 2.3 on 02/21/2022  CEA 2.4 on 03/21/2022  CEA 2.2 on 04/18/2022  CT chest abdomen pelvis 5/11/2022: progressive hepatic metastasis with increasing number and prominence of the hepatic lesions  Soft tissue density in the ileocecal valve probably fecal matter. CEA 2.2 on 05/16/2022  CEA 2.3 on 06/06/2022  PET/CT scan 06/06/2022 the recently described hepatic lesions on CT are not significantly hypermetabolic on PET relative to hepatic background activity. No generalized pattern of FDG avid metastatic disease  Imaging reviewed. HBP noted on 06/14/2022 reviewed; No evidence of any recurrence on repeat imaging. Evaluated by Dr. Armani Lee on 06/20/2022  CEA 2.4 on 07/02/2022  Colonoscopy on July 11, 2022 noted colon polyp x1 in the descending colon removed by cold snare polypectomy  Mild diverticulosis of the sigmoid colon  Previous cancer site and spot tattoo near the rectosigmoid junction with no gross recurrence in the lumen of the colon with biopsies of the mucosa taken  Small internal and external hemorrhoids  A. Descending colon polyp polypectomy: Tubular adenoma  B. Previous cancer SPOT biopsy:   Colonic mucosa with no significant pathologic changes  Negative for malignancy  Recommend follow-up colonoscopy in 2 years for surveillance  Operative note reviewed. Pathology reviewed. CEA 2.3 on 08/01/2022  CEA 2.1 on 08/29/2022  Labs reviewed. Creat 1.3; Recommended to increase po fluid intake. RTC 1 month with prior scans and labs   MSI testing noted no mismatch repair protein loss of expression  NGS testing ([employer])   MS-Stable: No therapies or clinical trials  TMB 3Muts/Mb: No therapies or clinical trials  KRAS [id number]: Therapies with clinical relevance in patient's tumor type: Cetuximab, Panitumumab  Therapies with clinical relevance in other tumor type: None  NRAS wild type:  Therapies with clinical relevance in patient's tumor type: Cetuximab, Panitumumab  Therapies with clinical relevance in other tumor type: None  APC: None None  MARIELA: None None    8/30/2022  Nawaf Richards MD  Board Certified Medical Oncologist

## 2022-09-21 ENCOUNTER — HOSPITAL ENCOUNTER (OUTPATIENT)
Dept: CT IMAGING | Age: 72
Discharge: HOME | End: 2022-09-21
Payer: MEDICARE

## 2022-09-21 DIAGNOSIS — C20 RECTAL CANCER METASTASIZED TO LIVER (HCC): ICD-10-CM

## 2022-09-21 DIAGNOSIS — C78.7 RECTAL CANCER METASTASIZED TO LIVER (HCC): ICD-10-CM

## 2022-09-21 PROCEDURE — 6360000004 HC RX CONTRAST MEDICATION: Performed by: RADIOLOGY

## 2022-09-21 PROCEDURE — 71260 CT THORAX DX C+: CPT

## 2022-09-21 PROCEDURE — 74177 CT ABD & PELVIS W/CONTRAST: CPT

## 2022-09-21 RX ADMIN — IOPAMIDOL 50 ML: 612 INJECTION, SOLUTION INTRAVENOUS at 12:08

## 2022-09-21 RX ADMIN — IOPAMIDOL 75 ML: 755 INJECTION, SOLUTION INTRAVENOUS at 12:08

## 2022-09-26 ENCOUNTER — HOSPITAL ENCOUNTER (OUTPATIENT)
Age: 72
Discharge: HOME | End: 2022-09-26
Payer: MEDICARE

## 2022-09-26 DIAGNOSIS — C78.7 RECTAL CANCER METASTASIZED TO LIVER (HCC): ICD-10-CM

## 2022-09-26 DIAGNOSIS — C20 RECTAL CANCER METASTASIZED TO LIVER (HCC): ICD-10-CM

## 2022-09-26 LAB
ALBUMIN SERPL-MCNC: 4.3 G/DL (ref 3.5–5.2)
ALP BLD-CCNC: 136 U/L (ref 40–129)
ALT SERPL-CCNC: 14 U/L (ref 0–40)
ANION GAP SERPL CALCULATED.3IONS-SCNC: 12 MMOL/L (ref 7–16)
AST SERPL-CCNC: 20 U/L (ref 0–39)
BASOPHILS ABSOLUTE: 0.04 E9/L (ref 0–0.2)
BASOPHILS RELATIVE PERCENT: 0.9 % (ref 0–2)
BILIRUB SERPL-MCNC: 0.5 MG/DL (ref 0–1.2)
BUN BLDV-MCNC: 24 MG/DL (ref 6–23)
CALCIUM SERPL-MCNC: 9.7 MG/DL (ref 8.6–10.2)
CEA: 2.1 NG/ML (ref 0–5.2)
CHLORIDE BLD-SCNC: 102 MMOL/L (ref 98–107)
CO2: 24 MMOL/L (ref 22–29)
CREAT SERPL-MCNC: 1.3 MG/DL (ref 0.7–1.2)
EOSINOPHILS ABSOLUTE: 0.16 E9/L (ref 0.05–0.5)
EOSINOPHILS RELATIVE PERCENT: 3.4 % (ref 0–6)
GFR AFRICAN AMERICAN: >60
GFR NON-AFRICAN AMERICAN: 54 ML/MIN/1.73
GLUCOSE BLD-MCNC: 101 MG/DL (ref 74–99)
HCT VFR BLD CALC: 35.1 % (ref 37–54)
HEMOGLOBIN: 11.7 G/DL (ref 12.5–16.5)
IMMATURE GRANULOCYTES #: 0.02 E9/L
IMMATURE GRANULOCYTES %: 0.4 % (ref 0–5)
LYMPHOCYTES ABSOLUTE: 0.62 E9/L (ref 1.5–4)
LYMPHOCYTES RELATIVE PERCENT: 13.4 % (ref 20–42)
MCH RBC QN AUTO: 30.1 PG (ref 26–35)
MCHC RBC AUTO-ENTMCNC: 33.3 % (ref 32–34.5)
MCV RBC AUTO: 90.2 FL (ref 80–99.9)
MONOCYTES ABSOLUTE: 0.32 E9/L (ref 0.1–0.95)
MONOCYTES RELATIVE PERCENT: 6.9 % (ref 2–12)
NEUTROPHILS ABSOLUTE: 3.48 E9/L (ref 1.8–7.3)
NEUTROPHILS RELATIVE PERCENT: 75 % (ref 43–80)
PDW BLD-RTO: 13.4 FL (ref 11.5–15)
PLATELET # BLD: 123 E9/L (ref 130–450)
PMV BLD AUTO: 10.4 FL (ref 7–12)
POTASSIUM SERPL-SCNC: 4.1 MMOL/L (ref 3.5–5)
RBC # BLD: 3.89 E12/L (ref 3.8–5.8)
SODIUM BLD-SCNC: 138 MMOL/L (ref 132–146)
TOTAL PROTEIN: 8 G/DL (ref 6.4–8.3)
WBC # BLD: 4.6 E9/L (ref 4.5–11.5)

## 2022-09-26 PROCEDURE — 36415 COLL VENOUS BLD VENIPUNCTURE: CPT

## 2022-09-26 PROCEDURE — 85025 COMPLETE CBC W/AUTO DIFF WBC: CPT

## 2022-09-26 PROCEDURE — 82378 CARCINOEMBRYONIC ANTIGEN: CPT

## 2022-09-26 PROCEDURE — 80053 COMPREHEN METABOLIC PANEL: CPT

## 2022-09-27 ENCOUNTER — OFFICE VISIT (OUTPATIENT)
Dept: ONCOLOGY | Age: 72
End: 2022-09-27
Payer: MEDICARE

## 2022-09-27 ENCOUNTER — HOSPITAL ENCOUNTER (OUTPATIENT)
Dept: INFUSION THERAPY | Age: 72
Discharge: HOME | End: 2022-09-27
Payer: MEDICARE

## 2022-09-27 VITALS
OXYGEN SATURATION: 99 % | HEIGHT: 73 IN | BODY MASS INDEX: 29.5 KG/M2 | HEART RATE: 64 BPM | WEIGHT: 222.6 LBS | SYSTOLIC BLOOD PRESSURE: 129 MMHG | DIASTOLIC BLOOD PRESSURE: 75 MMHG | TEMPERATURE: 98.5 F

## 2022-09-27 DIAGNOSIS — C20 RECTAL CANCER (HCC): Primary | ICD-10-CM

## 2022-09-27 DIAGNOSIS — C20 RECTAL CANCER METASTASIZED TO LIVER (HCC): Primary | ICD-10-CM

## 2022-09-27 DIAGNOSIS — C78.7 RECTAL CANCER METASTASIZED TO LIVER (HCC): Primary | ICD-10-CM

## 2022-09-27 PROCEDURE — 2580000003 HC RX 258: Performed by: INTERNAL MEDICINE

## 2022-09-27 PROCEDURE — 1123F ACP DISCUSS/DSCN MKR DOCD: CPT | Performed by: INTERNAL MEDICINE

## 2022-09-27 PROCEDURE — 99214 OFFICE O/P EST MOD 30 MIN: CPT

## 2022-09-27 PROCEDURE — 99215 OFFICE O/P EST HI 40 MIN: CPT | Performed by: INTERNAL MEDICINE

## 2022-09-27 PROCEDURE — 6360000002 HC RX W HCPCS: Performed by: INTERNAL MEDICINE

## 2022-09-27 PROCEDURE — 96523 IRRIG DRUG DELIVERY DEVICE: CPT

## 2022-09-27 RX ORDER — HEPARIN SODIUM (PORCINE) LOCK FLUSH IV SOLN 100 UNIT/ML 100 UNIT/ML
500 SOLUTION INTRAVENOUS PRN
Status: DISCONTINUED | OUTPATIENT
Start: 2022-09-27 | End: 2022-09-28 | Stop reason: HOSPADM

## 2022-09-27 RX ORDER — SODIUM CHLORIDE 0.9 % (FLUSH) 0.9 %
10 SYRINGE (ML) INJECTION PRN
Status: DISCONTINUED | OUTPATIENT
Start: 2022-09-27 | End: 2022-09-28 | Stop reason: HOSPADM

## 2022-09-27 RX ORDER — SODIUM CHLORIDE 0.9 % (FLUSH) 0.9 %
10 SYRINGE (ML) INJECTION PRN
Status: CANCELLED | OUTPATIENT
Start: 2022-09-27

## 2022-09-27 RX ORDER — HEPARIN SODIUM (PORCINE) LOCK FLUSH IV SOLN 100 UNIT/ML 100 UNIT/ML
500 SOLUTION INTRAVENOUS PRN
Status: CANCELLED | OUTPATIENT
Start: 2022-09-27

## 2022-09-27 RX ADMIN — SODIUM CHLORIDE, PRESERVATIVE FREE 10 ML: 5 INJECTION INTRAVENOUS at 09:01

## 2022-09-27 RX ADMIN — Medication 500 UNITS: at 09:02

## 2022-09-27 NOTE — PROGRESS NOTES
[place] [id number]           Attending Clinic Note     Reason for Visit: Follow-up on a patient with Metastatic Rectal Cancer. PCP: Matt Hatfield MD     History of Present Illness:  68 y/o  male who was referred to see Dr. Boy Landa (GI team) for evaluation of bright red blood per rectum, mild anemia and change in bowel habits with diarrhea. CEA 2640 on 10/19/2015. AlcP 299 AST 57 ALT 75 on 10/19/2015. Colonoscopy in 2013 noted no significant polyps, colitis or lesions at that time. Denies any Family History of colorectal cancer or polyps. Colonoscopy on 10/19/2015 revealed:  1. Ascending polyp, 8 mm, hot snare: Tubulovillous adenoma. 2. Transverse polyp, 1 cm, hot snare: Serrated polyp most consistent with sessile serrated adenoma. 3. Five splenic flexure polyps, three - 5 mm, 7 mm, 8 mm, hot snare and biopsy: Four segments of Tubular Adenoma  4. Descending polyp, 5 mm, biopsy: Tubular adenoma. 5. Sigmoid polyp, 4 mm biopsy, Serrated polyp most consistent with sessile serrated adenoma. 6. Two rectal polyps, 4 mm biopsy: Serrated polyp most consistent with hyperplastic polyp. 7. Rectosigmoid colon mass (large mass approximately 65% circumference of the lumen; Very friable, firm and hard): Tubulovillous adenoma with associated focal erosion and fibroplasia. CT scan abdomen/pelvis on 10/26/2015:  1. Small nodules at lung bases likely represent metastatic colon   cancer. 2. Extensive likely metastatic colon cancer throughout the liver. 3. Mild mural thickening and luminal narrowing in the   terminal ileum, otherwise nonspecific. Colonoscopy with snare removal rectal mass was performed by Dr. Wolf Hernandez. Pathology proved:  Rectal polyp: Invasive adenocarcinoma involving villous adenoma and extending to the cauterized edge of excision. KRAS Mutation: Mutation detected.   BRAF Mutation: Mutation not detected. 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 CEA 3488. Bone scan on 11/10/2015 noted no metastatic disease. CT chest on 11/10/2015 revealed Multiple pulmonary nodules in the upper and lower lobes consistent with metastatic disease;   Hepatic metastasis also visualized. 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 Mediport was placed. Cycle # 1 of FOLFOX + Avastin was on 11/23/2015. CEA was 4555 on 11/23/2015. Cycle # 2 of FOLFOX + Avastin was on 12/08/2015. CEA was 3160 on 12/08/2015. Cycle # 3 of FOLFOX + Avastin was on 12/22/2015. CEA was 2516 on 12/21/2015. Cycle # 4 of FOLFOX + Avastin was on 01/05/2016. CEA was 2098 on 01/05/2015. Cycle # 5 of FOLFOX + Avastin was on 01/19/2016. CEA was 1511 on 01/05/2015.     -Bone scan on 01/26/2016 noted no metastatic disease. CT chest on 01/26/2016 revealed Significant response to treatment with no visible residual nodules. CT scan abdomen/pelvis on 01/26/2016 noted Interval decreased size of multiple masses in the liver compatible with treatment response. Continue another 2 months of FOLFOX + Avastin and repeat scans. Cycle # 6 of FOLFOX + Avastin was on 02/02/2016.  on 02/02/2016. Cycle # 7 of FOLFOX + Avastin was on 02/16/2016.  on 02/16/2016. Cycle # 8 of FOLFOX + Avastin was on 03/01/2016.  on 03/01/2016. Cycle # 9 of FOLFOX + Avastin was on 03/15/2016.  on 03/15/2016. Cycle # 10 of FOLFOX + Avastin was on 03/29/2016. .4 on 03/29/2016. Cycle # 11 of FOLFOX + Avastin was on 04/12/2016. .4 on 04/12/2016. Re-staging scans on 04/19/2016: CT Chest: clear lungs; no evidence of recurring pulmonary nodule; CT Abdomen/Pelvis: Further interval decrease in size of the multiple metastatic hepatic lesions, the largest lesion now measures 3.9 x 3.5 cm and previously measured 4.5 cm in maximum diameter.  No mesenteric lymphadenopathy is identified; Bone Scan: No evidence of osseous metastasis. Continue same regimen and re-stage in 2-3 months. Cycle # 12 FOLFOX + Avastin was on 04/26/2016. .5 on 04/26/2016. Cycle # 13 FOLFOX + Avastin was on 05/10/2016. .3 on 05/10/2016. Cycle # 14 FOLFOX+AVASTIN was on 05/24/2016. .5 on 05/24/2016. Cycle # 15 FOLFOX + Avastin was on 06/07/2016. CEA 90.5 on 06/07/2016. Ad[facility] to [facility]/13/2016-06/16/2016 for abdominal pain: EGD noted 1.5 cm clean based duodenal bulb ulceration s/p epinephrine and bicap per Dr. Selam Martinez. No active bleeding. A. Stomach, biopsy: Mild chronic gastritis, immunostain negative for Helicobacter  B. Esophagus, biopsy: Gastric glandular mucosa with prominent intestinal metaplasia (Mad[place]'s epithelium), negative for epithelial dysplasia, esophageal squamous mucosa not identified  Cycle # 16 FOLFOX (discontinued avastin (bevacizumab) given association of peptic ulcer disease and known association of GI perforation) was on 06/21/2016. Cycle # 17 FOLFOX was on 07/05/2016. CEA 52.6 on 07/19/2016. Cycle # 17 FOLFOX was on 07/05/2016. CEA 52.6 on 07/05/2016. CEA 47.6 on 07/19/2016. Re-staging scans 07/19/2106: CT Chest negative for metastatic disease. CT Abdomen/Pelvis: Stable hepatic lesions; Question new mural thickening in the cecum. Bone Scan: New Let hip lesion suspicious for bone metastasis. Increased CEA; new mural thickening in the cecum and new left hip lesion suspicious for bone metastasis are consistent with disease progression; He derived maximum benefit from FOLFOX/AVASTIN. D/C FOLFOX/AVASTIN. We recommended FOLFIRI second line therapy. Cycle # 1 FOLFIRI was on 08/02/2016. CEA 40.8 on 08/02/2016. Xgeva q4 weeks started on 08/02/2016. Cycle # 2 FOLFIRI was on 08/16/2016. CEA 31.7 on 08/16/2016. Cycle # 3 FOLFIRI (added Avastin) was on 08/30/2016 given that ulcers healed on EGD 08/15/2016 by Dr. Danielle Camarena; Protonix bid since avastin re-started.    Colonoscopy on 08/29/2016 (to look at the cecal area) unremarkable. CEA 26.7 on 08/30/2016. Cycle # 4 FOLFIRI/Avastin was on 09/13/2016. CEA 23.4 on 09/13/2016. Cycle # 5 FOLFIRI/Avastin was on 09/27/2016. CEA 22.3 on 09/27/2016. Cycle # 6 FOLFIRI/Avastin was on 10/11/2016. CEA 18.4 on 10/11/2016. Bone scan 10/21/2016 stable bone metastasis; ? New lesion anterior left sixth rib likely interval healing fracture. CT abdomen/pelvis revealed stable hepatic metastasis. CT chest negative for metastatic disease. Continue FOLFIRI/Avastin and repeat scans in 3 months. Cycle # 7 FOLFIRI/Avastin was on 10/25/2016. CEA 16.1  Cycle # 8 FOLFIRI/Avastin was on 11/08/2016. CEA 15.7  Cycle # 9 FOLFIRI/Avastin was on 11/29/2016. CEA 13.6 on 11/29/2016. Cycle # 10 FOLFIRI/Avastin was on 12/13/2016. CEA 10.6 on 12/13/2016. Cycle # 11 FOLFIRI/Avastin was on 12/27/2016. CEA 11.1 on 12/27/2016. Cycle # 12 FOLFIRI/Avastin was on 01/10/2017. CEA 9.7 on 01/10/2017. CT chest 01/23/2017 No new pulmonary nodule or lymphadenopathy. Patchy groundglass opacities within the left lower lobe, suggestive of infectious or inflammatory etiology. Followup CT chest in 3 months. Slight increased linear sclerosis along the left anterior sixth rib corresponding to an area of increased uptake on the prior bone scan from 10/21/2016, favored to be related to interval healing changes of a nondisplaced fracture. CT abdomen/pelvis on 01/23/2017 Redemonstration of multiple hepatic metastases, which are similar compared to the prior CT from 10/21/2016. Redemonstration of area of increased sclerosis along the right acetabulum suspicious for metastatic disease. Bone scan on 01/23/2017 Stable subtle uptake within the left proximal diaphysis, again suggestive of metastatic disease  Decreased subtle uptake within the medial right acetabulum.    Decreased uptake within the left anterior sixth rib corresponding to linear sclerosis on the recent CT, favored to be related to an joint rib fracture. Continue FOLFIRI + Avastin and repeat scans in 3 months. Cycle # 13 FOLFIRI + Avastin was on 01/24/2017. Cycle # 14 FOLFIRI + Avastin was on 02/07/2017. Cycle # 15 FOLFIRI + Avastin was on 02/21/2017. Cycle # 16 FOLFIRI + Avastin was on 03/07/2017. Cycle # 17 FOLFIRI + Avastin was on 03/21/2017. Cycle # 18 FOLFIRI + Avastin was on 04/04/2017. CT chest 04/17/2017 negative for metastatic disease. CT abdomen/pelvis 04/17/2017 Stable hypodense metastatic lesions within the liver. Stable area of increased sclerosis along the right acetabulum  Bone scan 04/17/2017 Stable subtle uptake within the left proximal femoral diaphysis; No definite abnormal uptake in the region of a sclerotic lesion along the posterior column of the right acetabulum noted on CT. Stable slight uptake within the left anterior sixth rib corresponding to linear sclerosis on the recent CT, favored to be related to a fracture. Continue FOLFIRI + Avastin and repeat scans in 3 months. Cycle # 19 FOLFIRI + Avastin was on 04/18/2017. Cycle # 20 FOLFIRI + Avastin was on 05/02/2017. Cycle # 21 FOLFIRI + Avastin was on 05/16/2017. Cycle # 22 FOLFIRI + Avastin was on 05/30/2017. Cycle # 23 FOLFIRI + Avastin was on 06/13/2017. Cycle # 24 FOLFIRI + Avastin was on 06/27/2017. Cycle # 25 FOLFIRI + Avastin was on 07/11/2017. Cycle # 26 FOLFIRI + Avastin was on 07/25/2017. Cycle # 27 FOLFIRI + Avastin was on 08/08/2017. Cycle # 28 FOLFIRI + Avastin was on 08/22/2017. Cycle # 29 FOLFIRI + Avastin was on 09/05/2017. Cycle # 30 FOLFIRI + Avastin was on 09/19/2017. Bone scan 09/26/2017 stable. CT abdomen/pelvis on 09/26/2017 Stable hypodense mass in the liver. Stable sclerotic lesion in the acetabulum. CT chest 09/26/2017 negative for metastatic disease. Cycle # 31 FOLFIRI + Avastin was on 10/03/2017. CEA 7.4 on 10/03/2017. Cycle # 32 FOLFIRI + Avastin was on 10/17/2017.  CEA 6.0 on 10/17/2017. Cycle # 33 FOLFIRI + Avastin was on 10/31/2017. CEA 6.8 on 10/31/2017. Cycle # 34 FOLFIRI + Avastin was on 11/14/2017. CEA 7.2 on 11/14/2017. Cycle # 35 FOLFIRI + Avastin was on 11/28/2017. CEA 5.1 on 11/28/2017. Cycle # 36 FOLFIRI + Avastin was on 12/12/2017. CEA 6.1 on 12/12/2017. Bone scan 12/22/2017 noted no evidence of metastatic disease to the axial or appendicular skeleton. CT chest 12/22/2017 noted no evidence of metastatic disease. CT abdomen/pelvis 12/22/2017 noted stable hypodense lesions in the liver. Continue FOLFIRI + Avastin and repeat scans in 3 months. Cycle # 37 FOLFIRI + Avastin was on 12/27/2018. Cycle # 38 FOLFIRI + Avastin was on 01/09/2018. Cycle # 39 FOLFIRI + Avastin was on 01/23/2018. Cycle # 40 FOLFIRI + Avastin was on 02/06/2018. Cycle # 41 FOLFIRI + Avastin was on 02/20/2018. Cycle # 42 FOLFIRI + Avastin was on 03/06/2018. Cycle # 43 FOLFIRI + Avastin was on 03/20/2018. Bone scan on 03/26/2018 negative for metastatic disease. CT chest 03/26/2018 noted ? new 7 mm nodule in LUCY. CT abdomen/pelvis 03/26/2018 noted stable hypodense lesions in the liver. ? New small ascites in the abdomen. Patient wants to continue to monitor the lung finding and repeat scans in 2 months instead of changing the current regimen into oral Lonsurf. Cycle # 44 FOLFIRI + Avastin was on 04/03/2018. CEA 6.3 on 04/03/2018. Cycle # 45 FOLFIRI + Avastin was on 04/24/2018. CEA 5.3 on 04/24/2018. Cycle # 46 FOLFIRI + Avastin was on 05/08/2018. CEA 5.4 on 05/08/2018. Cycle # 47 FOLFIRI + Avastin was on 05/22/2018. CEA 6.1 on 05/22/2018. CT chest 05/31/2018 noted stable nodule in LUCY. No evidence of worsening malignancy. CT abdomen/pelvis on 05/31/2018 noted stable liver metastasis. No evidence of worsening malignancy. Continue FOLFIRI + Avastin and repeat scans in 3 months. Cycle # 48 FOLFIRI + Avastin was on 06/06/2018. Cycle # 49 FOLFIRI + Avastin was on 06/19/2018. Cycle # 50 FOLFIRI + Avastin was on 07/03/2018. Cycle # 51 FOLFIRI + Avastin was on 07/24/2018. Cycle # 52 FOLFIRI + Avastin was on 08/14/2018. Cycle # 53 FOLFIRI + Avastin was on 08/28/2018. CT chest 09/06/2018 noted interval decrease in size of LUCY measuring up to 4 mm, suggestive of treatment response. No mediastinal or hilar LN  CT abdomen/pelvis 09/06/2018 Slight interval increase in size of a previously noted metastatic lesion within the right hepatic lobe. This may be related to differences in phase of enhancement compared to the most recent study from 5/31/2018, the lesion remains decreased in size compared to the more previous studies. No abdominal, retroperitoneal, or pelvic lymphadenopathy. Continue FOLFIRI + Avastin and repeat scans in 2 months. Cycle # 54 FOLFIRI + Avastin was on 09/11/2018. Cycle # 55 FOLFIRI + Avastin was on 09/25/2018. Cycle # 56 FOLFIRI + Avastin was on 10/09/2018. Cycle # 57 FOLFIRI + Avastin was on 10/23/2018. CT chest 11/02/2018 stable 3 mm nodule within LUCY. No new right and left lung nodule. No mediastinal or osseous lesion. CT abdomen/pelvis 11/02/2018 stable metastatic disease to liver. No new metastatic disease identified. No mesenteric or retroperitoneal LN. No gross colonic lesion identified. Continue FOLFIRI + Avastin and repeat scans in 3 months. Cycle # 58 FOLFIRI + Avastin was on 11/06/2018. CEA 7.6 on 11/05/2018. Cycle # 59 FOLFIRI + Avastin was on 11/27/2018. CEA 6.9 on 11/26/2018. Cycle # 60 FOLFIRI + Avastin was on 12/11/2018. CEA 6.7 on 12/11/2018. Cycle # 61 FOLFIRI + Avastin was on 01/08/2019. CEA 5.6 on 01/07/2019. Cycle # 62 FOLFIRI + Avastin was on 01/29/2019. CEA 4.6 on 01/28/2019. Cycle # 63 FOLFIRI + Avastin was on 02/12/2019. CEA 4.3 on 02/11/2019. CT chest 02/21/2019 no evidence of metastatic disease. CT abdomen/pelvis 02/21/2019 stable hypodense liver lesions. No evidence of worsening metastatic disease.  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 Ordered MRI thoracic spine and referred to neurosurgery team.    Cycle # 64 FOLFIRI + Avastin was on 02/26/2019. CEA 4.7 on 02/25/2019. Cycle # 65 FOLFIRI + Avastin was on 03/12/2019. CEA 4.0 on 03/11/2019. Cycle # 66 FOLFIRI + Avastin was on 03/26/2019. CEA 4.7 on 03/25/2019. Cycle # 67 FOLFIRI + Avastin was on 04/09/2019. CEA 5.6 on 04/08/2019. Cycle # 68 FOLFIRI + Avastin was on 04/30/2019. CEA 4.9 on 04/29/2019. Cycle # 69 FOLFIRI + Avastin was on 05/14/2019. CEA 5.3 on 05/14/2019. CT chest 05/23/2019 stable small lung nodule with no evidence of metastatic disease. CT abdomen/pelvis 05/23/2019 Decrease conspicuity of the liver lesions. No new lesions seen. Stable splenomegaly. Continue FOLFIRI + Avastin and repeat scans in 3 months. Cycle # 70 FOLFIRI + Avastin was on 06/04/2019. CEA 4.8 on 06/03/2019. Cycle # 71 FOLFIRI + Avastin was on 06/18/2019. CEA 4.6 on 06/17/2019. Cycle # 72 FOLFIRI + Avastin was on 07/09/2019. CEA 4.3 on 07/08/2019. Cycle # 73 FOLFIRI + Avastin was on 07/30/2019. CEA 5.0 on 07/29/2019. Cycle # 74 FOLFIRI + Avastin was on 08/13/2019. CEA 5.0 on 08/12/2019. CT chest 08/20/2019 negative  CT abdomen/pelvis 08/20/2019 Previous identified hepatic lesions are not visualized on the current exam.  Continue FOLFIRI + Avastin and repeat scans in 3 months. Cycle # 75 FOLFIRI + Avastin was on 08/27/2019. CEA 5.0 on 08/26/2019. Cycle # 76 FOLFIRI + Avastin was on 09/17/2019. CEA 5.5 on 09/16/2019. Cycle # 77 FOLFIRI + Avastin was on 10/15/2019. CEA 4.6 on 10/15/2019. Cycle # 78 FOLFIRI + Avastin was on 10/29/2019. CEA 4.1 on 10/28/2019. Cycle # 79 FOLFIRI + Avastin was on 11/12/2019. CEA 4.3 on 11/12/2019. Cycle # 80 FOLFIRI + Avastin was on 12/10/2019. CEA 4.0 on 12/09/2019.   CT chest 12/19/2019 Stable 3 mm nodule within the left upper lobe, similar to the previous studies dating back to 11/2/2018, however decreased in size compared to the CT from 3/26/2018. No thoracic LN. CT abdomen/pelvis 12/19/2019 Previously described small hypodense lesions are more conspicuous on the current study compared to the recent study throughout the liver from 8/20/2019, however similar to the prior study from 2/21/2019. Findings may be related to differences in contrast opacification. No abdominal or pelvic lymphadenopathy. Continue FOLFIRI + Avastin and repeat scans in 2 months to f/u on liver lesions. Cycle # 81 FOLFIRI + Avastin was on 01/07/2020. CEA 3.8 on 01/06/2020. Cycle # 82 FOLFIRI + Avastin was on 01/21/2020. CEA 3.7 on 01/20/2020. Cycle # 83 FOLFIRI + Avastin was on 02/04/2020. CEA 4.1 on 02/03/2020. Cycle # 84 FOLFIRI + Avastin was on 02/18/2020. CEA 4.6 on 02/17/2020. EGD by Dr. Mckayla Weinberg 03/02/2020 showing no masses or lesions  Cycle # 85 FOLFIRI + Avastin was on 03/03/2020. CEA 7.4 on 03/02/2020. Cycle # 86 FOLFIRI + Avastin was on 03/17/2020. CEA 4.5 on 03/16/2020. Colonoscopy on 03/23/2020 by Dr. Mckayla Weinberg unremarkable (records requested). Cycle # 87 FOLFIRI + Avastin was on 03/31/2020. CEA 4.1 on 03/30/2020. Cycle # 88 FOLFIRI + Avastin was on 04/21/2020. CEA 6.4 on 04/20/2020. Cycle # 89 FOLFIRI + Avastin was on 05/05/2020. CEA 5.8 on 05/04/2020. Cycle # 90 FOLFIRI + Avastin was on 06/02/2020. CEA 5.5 on 06/01/2020. Cycle # 91 FOLFIRI + Avastin was on 06/16/2020. CEA 5.6 on 06/15/2020. CT chest 06/23/2020 noted no metastatic disease in the chest.   CT abdomen/pelvis 06/23/2020 Stable small liver lesions measuring up to 5 mm since 2019.   22 mm round mass in segment 8 of the liver with central high density stable from December 2019), previously measuring up to 34 mm in 2017. No additional metastatic disease in the abdomen or pelvis. Small amount of ascites. Overall stable disease. Continue FOLFIRI + Avastin and repeat scans in 2-3 months. Cycle # 92 FOLFIRI + Avastin was on 07/07/2020.  CEA 5.7 on 07/06/2020. Cycle # 93 FOLFIRI + Avastin was on 07/21/2020. CEA 5.9 on 07/20/2020. Cycle # 94 FOLFIRI + Avastin was on 08/04/2020. CEA 5.5 on 08/04/2020. Cycle # 95 FOLFIRI + Avastin was on 08/25/2020. CEA 6.1 on 08/24/2020. CT chest 9/2/2020 stable unremarkable enhanced CT of the thorax. There is no metastatic disease to the lungs. CT abdomen pelvis on 9/2/2020 stable 2.2 cm low attenuated lesion within the central aspect of the right hepatic lobe favored to represent treated metastatic disease. No new hepatic lesion is identified. Stable splenomegaly  There is no appreciable colonic lesion or stricture  Pericholecystic fluid of uncertain etiology. General surgery team consulted. Laparoscopic cholecystectomy with normal cholangiogram 10/27/20  Gallbladder: Chronic cholecystitis and cholelithiasis. Unremarkable regional lymph node. 11/13/2020; Seen by Dr. Woodfin Claude from General surgery team; cleared to re-start chemotherapy. Cycle # 96 FOLFIRI + Avastin was on 11/17/2020. CEA 3.6 on 11/16/2020. Cycle # 97 FOLFIRI + Avastin was on 12/01/2020. CEA 3.5 on 11/30/2020. Cycle # 98 FOLFIRI + Avastin was on 12/15/2020. CEA 4.3 on 12/15/2020. Cycle # 99 FOLFIRI + Avastin was on 01/05/2021. CEA 4.7 on 01/04/2021. CT chest 01/13/2021 negative for metastatic disease. CT abdomen/pelvis 01/13/2021 Unchanged central hepatic lesion. No specific signs of abdominopelvic metastatic disease. Continue FOLFIRI + Avastin and repeat scans in 3 months. Cycle # 100 FOLFIRI + Avastin was on 01/19/2021. CEA 4.7 on 01/18/2021. Cycle # 101 FOLFIRI + Avastin was on 02/02/2021. CEA 5.2 on 02/01/2021. Cycle # 102 FOLFIRI + Avastin was on 02/16/2021. CEA 5.6 on 02/15/2021. Cycle # 103 FOLFIRI + Avastin was on 03/02/2021. Cycle # 104 FOLFIRI (20% dose reduced due to significant toxicity) + Avastin was on 03/16/2021. Cycle # 105 FOLFIRI (same dose as cycle # 104) + Avastin was on 03/30/2021.  CEA 6.5 on 03/29/2021. Cycle # 106 FOLFIRI (same dose as cycle # 104) + Avastin was on 04/13/2021. CEA 6.0 on 04/12/2021  CT chest 04/20/2021 no evidence of metastatic disease. CT abdomen/pelvis 04/20/2021 No evidence of progressive metastatic disease. Stable 2 cm lesion in the right lobe of the liver, likely representing treated metastatic disease. Imaging reviewed. Continue FOLFIRI + Avastin and repeat scans in 3 months  Cycle # 107 FOLFIRI + Avastin was on 04/27/2021  Cycle # 108 FOLFIRI + Avastin was on 05/11/2021. Cycle # 109 FOLFIRI (held Avastin due to upcoming surgery) on 05/25/2021. CEA 6.3 on 5/24/21  Cycle # 110 FOLFIRI (held Avastin due to upcoming surgery) on 06/08/2021. CEA 6.3 on 6/07/21. Right inguinal/scrotol hernia repair on 07/13/2021 with Dr Washington Running with folely removal on Friday 07/23/2021. He developed fever on 07/24/2021 and was brought to [address] [place], [place] [address] ER via EMS; Sepsis secondary to urinary tract infection   Completed Abx  CEA 4.1 on 08/30/2021  Cycle # 111 FOLFIRI was on 08/31/2021. HBP team for evaluation of liver lesions; His lesion does appear to be a hemangioma as his prior metastatic deposits in his liver were hypo attenuating compared to this CT which is hyper attenuating. compared to his prior CT scans he has disappearing liver metastasis. MRI liver on 09/17/2021 No suspicious liver lesion identified. Colonoscopy GI Dr. Roxanna Kumar on 09/20/2021 unremarkable  CEA 3.3 on 09/27/2021. CEA 3.2 on 10/25/2021. CEA 2.7 on 11/22/2021. CEA 2.5 on 12/27/2021. CEA 2.4 on 01/24/2022. CEA 2.3 on 02/21/2022. CEA 2.4 on 03/21/2022. CEA 2.2 on 04/18/2022  CEA 2.2 on 05/16/2022  CEA 2.3 on 06/06/2022  HBP note 06/14/2022 reviewed. No evidence of recurrence on repeat imaging. CEA 2.4 on 07/02/2022  CEA 2.3 on 08/01/2022  CEA 2.1 on 08/29/2022  CEA 2.1 on 09/26/2022. Today 09/27/2022; No fever chills. Fair appetite and energy level. No chest pain or shortness of breath. No nausea vomiting. Intermittent arthralgias    Review of Systems;  CONSTITUTIONAL: No fever, chills. Fair appetite and energy level  ENMT: Eyes: No diplopia; Nose: No epistaxis. Mouth:No lesions  RESPIRATORY: No hemoptysis, shortness of breath. CARDIOVASCULAR: No chest pain, palpitations. GASTROINTESTINAL:No N/V, abdominal pain. GENITOURINARY: No dysuria, urinary frequency, hematuria. MSK: Intermittent Arthritis   NEURO: No syncope, presyncope, headache. Remainder ROS: Negative. Past Medical History:   Past Medical History               Diagnosis Date    Arthritis      Cancer ([facility] [address] [address]         colon    Depression      Hyperlipidemia      Hypertension           Medications:  Reviewed and reconciled. Allergies: Allergies   Allergen Reactions    Neosporin [Neomycin-Polymyxin-Gramicidin] Rash    Tape Flaquito Sadiq Tape] Rash      Physical Exam:  /75   Pulse 64   Temp 98.5 °F (36.9 °C)   Ht 6' 1\" (1.854 m)   Wt 222 lb 9.6 oz (101 kg)   SpO2 99%   BMI 29.37 kg/m²   GENERAL: Alert, oriented x 3, not in distress  HEENT: PERRLA, EOMI. No oral lesions   NECK: Supple. Without lymphadenopathy. LUNGS: CTA bilaterally, with no wheezing, crackles or rhonchi. CARDIOVASCULAR: Regular rhythm. No murmurs, rubs or gallops. ABDOMEN: Soft. Non-tender, non-distended. EXTREMITIES: Without clubbing, cyanosis  NEUROLOGIC: No focal deficits.    ECOG PS 1    Diagnostics:  Lab Results   Component Value Date    WBC 4.6 09/26/2022    HGB 11.7 (L) 09/26/2022    HCT 35.1 (L) 09/26/2022    MCV 90.2 09/26/2022     (L) 09/26/2022     Lab Results   Component Value Date     09/26/2022    K 4.1 09/26/2022     09/26/2022    CO2 24 09/26/2022    BUN 24 (H) 09/26/2022    CREATININE 1.3 (H) 09/26/2022    GLUCOSE 101 (H) 09/26/2022    CALCIUM 9.7 09/26/2022    PROT 8.0 09/26/2022    LABALBU 4.3 09/26/2022    BILITOT 0.5 09/26/2022    ALKPHOS 136 (H) 09/26/2022    AST 20 09/26/2022    ALT 14 09/26/2022    LABGLOM 54 09/26/2022 GFRAA >60 09/26/2022     Lab Results   Component Value Date    CEA 2.1 09/26/2022     Impression/Plan:  66 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 KRAS Mutation: Mutation detected. BRAF Mutation: Mutation not detected. 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 CT scan abdomen/pelvis on 10/26/2015 revealed small nodules at the lung bases, extensive liver lesions consistent with metastatic rectosigmoid cancer. CEA 3488 on 10/30/2015. Bone scan on 11/10/2015 noted no metastatic disease. CT chest on 11/10/2015 revealed Multiple pulmonary nodules in the upper and lower lobes consistent with metastatic disease; Hepatic metastasis also visualized. 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 Mediport was placed. Cycle # 1 of FOLFOX + Avastin was on 11/23/2015. CEA was 4555 on 11/23/2015. Cycle # 2 of FOLFOX + Avastin was on 12/08/2015. CEA was 3160 on 12/08/2015. Cycle # 3 of FOLFOX + Avastin was on 12/22/2015. CEA was 2516 on 12/21/2015. Cycle # 4 of FOLFOX + Avastin was on 01/05/2016. CEA was 2098 on 01/05/2015. Cycle # 5 of FOLFOX + Avastin was on 01/19/2016. CEA was 1511 on 01/05/2015.     -Bone scan on 01/26/2016 noted no metastatic disease. CT chest on 01/26/2016 revealed Significant response to treatment with no visible residual nodules. CT scan abdomen/pelvis on 01/26/2016 noted Interval decreased size of multiple masses in the liver compatible with treatment response. Continue another 2 months of FOLFOX + Avastin and repeat scans. Cycle # 6 of FOLFOX + Avastin was on 02/02/2016.  on 02/02/2016. Cycle # 7 of FOLFOX + Avastin was on 02/16/2016.  on 02/16/2016. Cycle # 8 of FOLFOX + Avastin was on 03/01/2016.  on 03/01/2016. Cycle # 9 of FOLFOX + Avastin was on 03/15/2016.  on 03/15/2016. Cycle # 10 of FOLFOX + Avastin was on 03/29/2016. .4 on 03/29/2016.   Cycle # 11 of FOLFOX + Avastin was on 04/12/2016. .4 on 04/12/2016. Re-staging scans on 04/19/2016: CT Chest: clear lungs; no evidence of recurring pulmonary nodule; CT Abdomen/Pelvis: Further interval decrease in size of the multiple metastatic hepatic lesions, the largest lesion now measures 3.9 x 3.5 cm and previously measured 4.5 cm in maximum diameter. No mesenteric lymphadenopathy is identified; Bone Scan: No evidence of osseous metastasis. Continue same regimen and re-stage in 2-3 months. Cycle # 12 FOLFOX + Avastin was on 04/26/2016. .5 on 04/26/2016. Cycle # 13 FOLFOX + Avastin was on 05/10/2016. .3 on 05/10/2016. Cycle # 14 FOLFOX+AVASTIN was on 05/24/2016. .5 on 05/24/2016. Cycle # 15 FOLFOX + Avastin was on 06/07/2016. CEA 90.5 on 06/07/2016. Admitted to [facility] 06/13/2016-06/16/2016 for abdominal pain: EGD noted 1.5 cm clean based duodenal bulb ulceration s/p epinephrine and bicap per Dr. Jeannette Pereira. No active bleeding. A. Stomach, biopsy: Mild chronic gastritis, immunostain negative for Helicobacter  B. Esophagus, biopsy: Gastric glandular mucosa with prominent ntestinal metaplasia ([place]'s epithelium), negative for epithelial dysplasia, esophageal squamous mucosa not identified     Cycle # 16 FOLFOX (discontinued avastin (bevacizumab) given association of peptic ulcer disease and known association of GI perforation.) was on 06/21/2016. CEA 47 on 06/21/2016. Cycle # 17 FOLFOX was on 07/05/2016. CEA 52.6 on 07/05/2016. CEA 47.6 on 07/19/2016. Re-staging scans 07/19/2106: CT Chest negative for metastatic disease. CT Abdomen/Pelvis: Stable hepatic lesions; Question new mural thickening in the cecum. Bone Scan: New Let hip lesion suspicious for bone metastasis. Increased CEA; new mural thickening in the cecum and new left hip lesion suspicious for bone metastasis are consistent with disease progression; He derived maximum benefit from FOLFOX/AVASTIN. D/C FOLFOX/AVASTIN.  We Redemonstration of area of increased sclerosis along the right acetabulum suspicious for metastatic disease. Bone scan on 01/23/2017 Stable subtle uptake within the left proximal diaphysis, again suggestive of metastatic disease  Decreased subtle uptake within the medial right acetabulum. Decreased uptake within the left anterior sixth rib corresponding to linear sclerosis on the recent CT, favored to be related to an joint rib fracture. Continue FOLFIRI + Avastin and repeat scans in 3 months. Cycle # 13 FOLFIRI + Avastin was on 01/24/2017. Cycle # 14 FOLFIRI + Avastin was on 02/07/2017. Cycle # 15 FOLFIRI + Avastin was on 02/21/2017. Cycle # 16 FOLFIRI + Avastin was on 03/07/2017. Cycle # 17 FOLFIRI + Avastin was on 03/21/2017. Cycle # 18 FOLFIRI + Avastin was on 04/04/2017. CT chest 04/17/2017 negative for metastatic disease. CT abdomen/pelvis 04/17/2017 Stable hypodense metastatic lesions within the liver. Stable area of increased sclerosis along the right acetabulum  Bone scan 04/17/2017 Stable subtle uptake within the left proximal femoral diaphysis; No definite abnormal uptake in the region of a sclerotic lesion along the posterior column of the right acetabulum noted on CT. Stable slight uptake within the left anterior sixth rib corresponding to linear sclerosis on the recent CT, favored to be related to a fracture. Continue FOLFIRI + Avastin and repeat scans in 3 months. Cycle # 19 FOLFIRI + Avastin was on 04/18/2017. CEA 7.5 on 04/18/2017. Cycle # 20 FOLFIRI + Avastin was on 05/02/2017. CEA 7.7 on 05/02/2017. Cycle # 21 FOLFIRI + Avastin was on 05/16/2017. CEA 7.1 on 05/16/2017. Cycle # 22 FOLFIRI + Avastin was on 05/30/2017. CEA 8.2 on 05/30/2017. Cycle # 23 FOLFIRI + Avastin was on 06/13/2017. CEA 7.7 on 06/13/2017. Cycle # 24 FOLFIRI + Avastin was on 06/27/2017. CEA 6.6 on 06/27/2017.    Re-staging scans 07/05/2017:  Bone scan stable proximal left femoral diaphysis, right acetabulum, and left anterior sixth rib lesions;  CT abdomen/pelvis stable hypodense metastatic lesions within the liver; CT chest without convincing evidence of metastatic disease. Cycle # 25 FOLFIRI + Avastin was on 07/11/2017. CEA 6.3 on 07/11/2017. Cycle # 26 FOLFIRI + Avastin was on 07/25/2017. CEA 7.3 on 07/25/2017. Cycle # 27 FOLFIRI + Avastin was on 08/08/2017. CEA 6.5 on 08/08/2017. Cycle # 28 FOLFIRI + Avastin was on 08/22/2017. CEA 5.9 on 08/22/2017. Cycle # 29 FOLFIRI + Avastin was on 09/05/2017. CEA 6.3 on 09/05/2017. Cycle # 30 FOLFIRI + Avastin was on 09/19/2017. CEA 6.3 on 09/19/2017. Bone scan 09/26/2017 stable. CT abdomen/pelvis on 09/26/2017 Stable hypodense mass in the liver. Stable sclerotic lesion in the acetabulum. CT chest 09/26/2017 negative for metastatic disease. Cycle # 31 FOLFIRI + Avastin was on 10/03/2017. CEA 7.4 on 10/03/2017. Cycle # 32 FOLFIRI + Avastin was on 10/17/2017. CEA 6.0 on 10/17/2017. Cycle # 33 FOLFIRI + Avastin was on 10/31/2017. CEA 6.8 on 10/31/2017. Cycle # 34 FOLFIRI + Avastin was on 11/14/2017. CEA 7.2 on 11/14/2017. Cycle # 35 FOLFIRI + Avastin was on 11/28/2017. CEA 5.1 on 11/28/2017. Cycle # 36 FOLFIRI + Avastin was on 12/12/2017. CEA 6.1 on 12/12/2017. Bone scan 12/22/2017 noted no evidence of metastatic disease to the axial or appendicular skeleton. CT chest 12/22/2017 noted no evidence of metastatic disease. CT abdomen/pelvis 12/22/2017 noted stable hypodense lesions in the liver. Continue FOLFIRI + Avastin and repeat scans in 3 months. Cycle # 37 FOLFIRI + Avastin was on 12/27/2018. CEA 6.7 on 12/27/2017. Cycle # 38 FOLFIRI + Avastin was on 01/09/2018. CEA 5.0 on 01/09/2018. Cycle # 39 FOLFIRI + Avastin was on 01/23/2018. CEA 6.5 on 01/23/2018. Cycle # 40 FOLFIRI + Avastin was on 02/06/2018. CEA 6.9 on 02/06/2018. Cycle # 41 FOLFIRI + Avastin was on 02/20/2018. CEA 6.4 on 02/20/2018.   Cycle # 42 FOLFIRI + Avastin was on small lung nodule with no evidence of metastatic disease. CT abdomen/pelvis 05/23/2019 Decrease conspicuity of the liver lesions. No new lesions seen. Stable splenomegaly. Continue FOLFIRI + Avastin and repeat scans in 3 months. Cycle # 70 FOLFIRI + Avastin was on 06/04/2019. CEA 4.8 on 06/03/2019. Cycle # 71 FOLFIRI + Avastin was on 06/18/2019. CEA 4.6 on 06/17/2019. Cycle # 72 FOLFIRI + Avastin was on 07/09/2019. CEA 4.3 on 07/08/2019. Cycle # 73 FOLFIRI + Avastin was on 07/30/2019. CEA 5.0 on 07/29/2019. Cycle # 74 FOLFIRI + Avastin was on 08/13/2019. CEA 5.0 on 08/12/2019. CT chest 08/20/2019 negative  CT abdomen/pelvis 08/20/2019 Previous identified hepatic lesions are not visualized on the current exam.  Continue FOLFIRI + Avastin and repeat scans in 3 months. Cycle # 75 FOLFIRI + Avastin was on 08/27/2019. CEA 5.0 on 08/26/2019. Cycle # 76 FOLFIRI + Avastin was on 09/17/2019. CEA 5.5 on 09/16/2019. Cycle # 77 FOLFIRI + Avastin was on 10/15/2019. CEA 4.6 on 10/15/2019. Cycle # 78 FOLFIRI + Avastin was on 10/29/2019. CEA 4.1 on 10/28/2019. Cycle # 79 FOLFIRI + Avastin was on 11/12/2019. CEA 4.3 on 11/12/2019. Cycle # 80 FOLFIRI + Avastin was on 12/10/2019. CEA 4.0 on 12/09/2019. CT chest 12/19/2019 Stable 3 mm nodule within the left upper lobe, similar to the previous studies dating back to 11/2/2018, however decreased in size compared to the CT from 3/26/2018. No thoracic LN. CT abdomen/pelvis 12/19/2019 Previously described small hypodense lesions are more conspicuous on the current study compared to the recent study throughout the liver from 8/20/2019, however similar to the prior study from 2/21/2019. Findings may be related to differences in contrast opacification. No abdominal or pelvic lymphadenopathy. Continue FOLFIRI + Avastin and repeat scans in 2 months to f/u on liver lesions. Cycle # 81 FOLFIRI + Avastin was on 01/07/2020. CEA 3.8 on 01/06/2020.   Cycle # 82 FOLFIRI + Avastin was on 01/21/2020. CEA 3.7 on 01/20/2020. Cycle # 83 FOLFIRI + Avastin was on 02/04/2020. CEA 4.1 on 02/03/2020. Cycle # 84 FOLFIRI + Avastin was on 02/18/2020. CEA 4.6 on 02/17/2020. CT chest 2/28/2020 no evidence of metastatic disease to the lungs and no mediastinal or hilar adenopathy. CT abdomen pelvis 2/28/2020 no enhancing lesions seen within the liver to suggest metastatic disease. Wall thickening of the rectosigmoid junction. Images reviewed. Continue FOLFIRI Avastin and repeat scans in 3 months  EGD by Dr. Stanislaw Ritchie 03/02/2020 showing no masses or lesions. Cycle # 85 FOLFIRI + Avastin was on 03/03/2020. CEA 7.4 on 03/02/2020. Cycle # 86 FOLFIRI + Avastin was on 03/17/2020. CEA 4.5 on 03/16/2020. Colonoscopy on 03/23/2020 by Dr. Stanislaw aguero (records requested). Cycle # 87 FOLFIRI + Avastin was on 03/31/2020. CEA 4.1 on 03/30/2020. Cycle # 88 FOLFIRI + Avastin was on 04/21/2020. CEA 6.4 on 04/20/2020. Cycle # 89 FOLFIRI + Avastin was on 05/05/2020. CEA 5.8 on 05/04/2020. Cycle # 90 FOLFIRI + Avastin was on 06/02/2020. CEA 5.5 on 06/01/2020. Cycle # 91 FOLFIRI + Avastin was on 06/16/2020. CEA 5.6 on 06/15/2020. CT chest 06/23/2020 noted no metastatic disease in the chest.   CT abdomen/pelvis 06/23/2020 Stable small liver lesions measuring up to 5 mm since 2019.   22 mm round mass in segment 8 of the liver with central high density stable from December 2019), previously measuring up to 34 mm in 2017. No additional metastatic disease in the abdomen or pelvis. Small amount of ascites. Overall stable disease. Continue FOLFIRI + Avastin and repeat scans in 2-3 months. Cycle # 92 FOLFIRI + Avastin was on 07/07/2020. CEA 5.7 on 07/06/2020. Cycle # 93 FOLFIRI + Avastin was on 07/21/2020. CEA 5.9 on 07/20/2020. Cycle # 94 FOLFIRI + Avastin was on 08/04/2020. CEA 5.5 on 08/04/2020. Cycle # 95 FOLFIRI + Avastin was on 08/25/2020. CEA 6.1 on 08/24/2020.   CT chest 9/2/2020 stable unremarkable enhanced CT of the thorax. There is no metastatic disease to the lungs. CT abdomen pelvis on 9/2/2020 stable 2.2 cm low attenuated lesion within the central aspect of the right hepatic lobe favored to represent treated metastatic disease. No new hepatic lesion is identified. Stable splenomegaly  There is no appreciable colonic lesion or stricture. Pericholecystic fluid of uncertain etiology. Laparoscopic cholecystectomy with normal cholangiogram 10/27/20  Gallbladder: Chronic cholecystitis and cholelithiasis. Unremarkable regional lymph node. 11/13/2020; Seen by Dr. Knight from General surgery team; cleared to re-start chemotherapy. Cycle # 96 FOLFIRI + Avastin was on 11/17/2020. CEA 3.6 on 11/16/2020. Cycle # 97 FOLFIRI + Avastin was on 12/01/2020. CEA 3.5 on 11/30/2020. Cycle # 98 FOLFIRI + Avastin was on 12/15/2020. CEA 4.3 on 12/15/2020. Cycle # 99 FOLFIRI + Avastin was on 01/05/2021. CEA 4.7 on 01/04/2021. CT chest 01/13/2021 negative for metastatic disease. CT abdomen/pelvis 01/13/2021 Unchanged central hepatic lesion. No specific signs of abdominopelvic metastatic disease. Continue FOLFIRI + Avastin and repeat scans in 3 months. Cycle # 100 FOLFIRI + Avastin was on 01/19/2021. CEA 4.7 on 01/18/2021. Cycle # 101 FOLFIRI + Avastin was on 02/02/2021. CEA 5.2 on 02/01/2021. Cycle # 102 FOLFIRI + Avastin was on 02/16/2021. CEA 5.6 on 02/15/2021. Cycle # 103 FOLFIRI + Avastin was on 03/02/2021. Cycle # 104 FOLFIRI (20% dose reduced due to significant toxicity) + Avastin was on 03/16/2021. Cycle # 105 FOLFIRI (same dose as cycle # 104) + Avastin was on 03/30/2021. CEA 6.5 on 03/29/2021. Cycle # 106 FOLFIRI (same dose as cycle # 104) + Avastin was on 04/13/2021. CEA 6.0 on 04/12/2021  CT chest 04/20/2021 no evidence of metastatic disease. CT abdomen/pelvis 04/20/2021 No evidence of progressive metastatic disease.   Stable 2 cm lesion in the right lobe of the liver, likely representing treated metastatic disease. Imaging reviewed. Continue FOLFIRI + Avastin and repeat scans in 3 months  Cycle # 107 FOLFIRI + Avastin was on 04/27/2021  Cycle # 108 FOLFIRI + Avastin was on 05/11/2021. CEA 6.4 on 05/10/2021. Cycle # 109 FOLFIRI (held Avastin due to upcoming surgery) on 05/25/2021. CEA 6.3 on 5/24/21  Cycle # 110 FOLFIRI (held Avastin due to upcoming surgery) on 06/08/2021. CEA 6.3 on 6/07/21. CEA 5.8 on 06/28/2021. Right inguinal/scrotol hernia repair on 07/13/2021 with Dr Washington Running with folely removal on 07/23/2021. He developed fever on 07/24/2021 and was brought to [address] [place], [place] [address] ER via EMS; Sepsis secondary to urinary tract infection; Completed Abx  CT chest 08/13/2021 no sign of recurrent or metastatic disease. CT abdomen/pelvis 08/13/2021 unchanged hepatic lesions. Splenomegaly and secondary signs of portal venous hypertension  CEA 4.0 on 08/16/2021  CEA 4.1 on 08/30/2021  Cycle # 111 FOLFIRI was on 08/31/2021. HBP team for evaluation of liver lesions; His lesion does appear to be a hemangioma as his prior metastatic deposits in his liver were hypo attenuating compared to this CT which is hyper attenuating. compared to his prior CT scans he has disappearing liver metastasis. MRI liver on 09/17/2021 No suspicious liver lesion identified. Colonoscopy GI Dr. Roxanna Kumar on 09/20/2021 unremarkable  CEA 3.3 on 09/27/2021. CEA 3.2 on 10/25/2021. CT chest 11/08/2021: Similar chronic appearing findings. No acute process or evidence of metastatic disease identified. CT Abdomen/pelvis 11/08/2021: No significant change in the appearance of the liver. Similar appearance of splenomegaly. No interval change. CEA 2.7 on 11/22/2021. CEA 2.5 on 12/27/2021  CEA 2.4 on 01/24/2022  CT chest/abdomen/pelvis 02/04/2022 Stable CT of the chest abdomen/ pelvis with the a 1.4 cm enhancing nodule in the right hepatic lobe which is marginally improved. Imaging reviewed.   CEA 2.3 on 02/21/2022  CEA 2.4 on 03/21/2022  CEA 2.2 on 04/18/2022  CT chest abdomen pelvis 5/11/2022: progressive hepatic metastasis with increasing number and prominence of the hepatic lesions  Soft tissue density in the ileocecal valve probably fecal matter. CEA 2.2 on 05/16/2022  CEA 2.3 on 06/06/2022  PET/CT scan 06/06/2022 the recently described hepatic lesions on CT are not significantly hypermetabolic on PET relative to hepatic background activity. No generalized pattern of FDG avid metastatic disease  Imaging reviewed. HBP noted on 06/14/2022 reviewed; No evidence of any recurrence on repeat imaging. Evaluated by Dr. Lita Howe on 06/20/2022  CEA 2.4 on 07/02/2022  Colonoscopy on July 11, 2022 noted colon polyp x1 in the descending colon removed by cold snare polypectomy  Mild diverticulosis of the sigmoid colon  Previous cancer site and spot tattoo near the rectosigmoid junction with no gross recurrence in the lumen of the colon with biopsies of the mucosa taken  Small internal and external hemorrhoids  A. Descending colon polyp polypectomy: Tubular adenoma  B. Previous cancer SPOT biopsy:   Colonic mucosa with no significant pathologic changes  Negative for malignancy  Recommend follow-up colonoscopy in 2 years for surveillance  Operative note reviewed. Pathology reviewed. CEA 2.3 on 08/01/2022  CEA 2.1 on 08/29/2022  CT chest 09/21/2022: No evidence of metastatic disease  CT abdomen/pelvis 09/21/2022: Previously measured areas in the liver are essentially unchanged and indeterminate with no significant hypermetabolism on recent PET-CT. No new hepatic lesion. No evidence for recurrent or metastatic disease within the abdomen or pelvis. CEA 2.1 on 09/26/2022. Imaging reviewed; encouraging results. No evidence of recurrent or metastatic disease. We will repeat scans in 3 months  Labs reviewed; Recommended to increase po fluid intake.   We offered him 1 L normal saline over 1 hour today 9/27/2022 but patient declined. RTC 1 month with prior labs. MSI testing noted no mismatch repair protein loss of expression  NGS testing ([employer])   MS-Stable: No therapies or clinical trials  TMB 3Muts/Mb: No therapies or clinical trials  KRAS G12R: Therapies with clinical relevance in patient's tumor type: Cetuximab, Panitumumab  Therapies with clinical relevance in other tumor type: None  NRAS wild type:  Therapies with clinical relevance in patient's tumor type: Cetuximab, Panitumumab  Therapies with clinical relevance in other tumor type: None  APC: None None  MARIELA: None None    9/27/2022  Minesh Quinones MD  Board Certified Medical Oncologist

## 2022-10-04 ENCOUNTER — OFFICE VISIT (OUTPATIENT)
Dept: SURGERY | Age: 72
End: 2022-10-04
Payer: MEDICARE

## 2022-10-04 VITALS
HEART RATE: 63 BPM | SYSTOLIC BLOOD PRESSURE: 143 MMHG | HEIGHT: 73 IN | WEIGHT: 225.9 LBS | DIASTOLIC BLOOD PRESSURE: 80 MMHG | BODY MASS INDEX: 29.94 KG/M2 | TEMPERATURE: 97.8 F | RESPIRATION RATE: 18 BRPM | OXYGEN SATURATION: 98 %

## 2022-10-04 DIAGNOSIS — C18.9 METASTATIC COLON CANCER TO LIVER (HCC): Primary | ICD-10-CM

## 2022-10-04 DIAGNOSIS — C78.7 METASTATIC COLON CANCER TO LIVER (HCC): Primary | ICD-10-CM

## 2022-10-04 PROCEDURE — 1123F ACP DISCUSS/DSCN MKR DOCD: CPT | Performed by: TRANSPLANT SURGERY

## 2022-10-04 PROCEDURE — 99213 OFFICE O/P EST LOW 20 MIN: CPT | Performed by: TRANSPLANT SURGERY

## 2022-10-04 NOTE — PROGRESS NOTES
Hepatobiliary and Pancreatic Surgery Attending History and Physical    Patient's Name/Date of Birth: Nandini Rodriguez /1950 (67 y.o.)      CC: Metastatic Colon Cancer to the Liver    HPI:  Patient had follow up CT. His last CEA was 2. 1. he was diagnosed with metastatic colon cancer in 2015. He has gained about 50lbs over the past 3 years. He is not losing any weight. Physical Exam:  BP (!) 143/80   Pulse 63   Temp 97.8 °F (36.6 °C) (Temporal)   Resp 18   Ht 6' 1\" (1.854 m)   Wt 225 lb 14.4 oz (102.5 kg)   SpO2 98%   BMI 29.80 kg/m²       PSYCH: mood and affect normal, alert and oriented x 3: No apparent distress, comfortable  EYES: Sclera white, pupils equal round and reactive to light  ENMT:  Hearing normal, trachea midline, ears externally intact  LYMPH: no obvious lympadenopathy in neck. RESP: Respiratory effort was normal with no retractions or use of accessory muscles. CV:  No pedal edema  GI/ Abdomen: Soft, nondistended, nontender, no guarding, no peritoneal signs  MSK: no clubbing/ no cyanosis/ gaitnormal       Assessment/Plan:  Metastatic colon cancer to the liver  - I reviewed their images prior to our office visit and we also reviewed them together today. - stable with no progression  - continue surveillance imaging    20 Minutes of which greater than 50% was spent counseling or coordinating his care. Thank you for the consultation allowing me to take part in Mr Tre Madison care.      Please send a copy of my note to Dr. Camilo Madison    Electronically signed by Yue Corrales MD on 10/4/2022 at 12:19 PM

## 2022-10-24 ENCOUNTER — HOSPITAL ENCOUNTER (OUTPATIENT)
Age: 72
Discharge: HOME | End: 2022-10-24
Payer: MEDICARE

## 2022-10-24 DIAGNOSIS — C78.7 RECTAL CANCER METASTASIZED TO LIVER (HCC): ICD-10-CM

## 2022-10-24 DIAGNOSIS — C20 RECTAL CANCER METASTASIZED TO LIVER (HCC): ICD-10-CM

## 2022-10-24 LAB
ALBUMIN SERPL-MCNC: 4.1 G/DL (ref 3.5–5.2)
ALP BLD-CCNC: 142 U/L (ref 40–129)
ALT SERPL-CCNC: 14 U/L (ref 0–40)
ANION GAP SERPL CALCULATED.3IONS-SCNC: 12 MMOL/L (ref 7–16)
AST SERPL-CCNC: 19 U/L (ref 0–39)
BASOPHILS ABSOLUTE: 0.02 E9/L (ref 0–0.2)
BASOPHILS RELATIVE PERCENT: 0.4 % (ref 0–2)
BILIRUB SERPL-MCNC: 0.5 MG/DL (ref 0–1.2)
BUN BLDV-MCNC: 22 MG/DL (ref 6–23)
CALCIUM SERPL-MCNC: 9.7 MG/DL (ref 8.6–10.2)
CEA: 1.7 NG/ML (ref 0–5.2)
CHLORIDE BLD-SCNC: 106 MMOL/L (ref 98–107)
CO2: 24 MMOL/L (ref 22–29)
CREAT SERPL-MCNC: 1.4 MG/DL (ref 0.7–1.2)
EOSINOPHILS ABSOLUTE: 0.13 E9/L (ref 0.05–0.5)
EOSINOPHILS RELATIVE PERCENT: 2.5 % (ref 0–6)
GFR SERPL CREATININE-BSD FRML MDRD: 53 ML/MIN/1.73
GLUCOSE BLD-MCNC: 107 MG/DL (ref 74–99)
HCT VFR BLD CALC: 33.3 % (ref 37–54)
HEMOGLOBIN: 11.3 G/DL (ref 12.5–16.5)
IMMATURE GRANULOCYTES #: 0.03 E9/L
IMMATURE GRANULOCYTES %: 0.6 % (ref 0–5)
LYMPHOCYTES ABSOLUTE: 0.67 E9/L (ref 1.5–4)
LYMPHOCYTES RELATIVE PERCENT: 13 % (ref 20–42)
MCH RBC QN AUTO: 30.6 PG (ref 26–35)
MCHC RBC AUTO-ENTMCNC: 33.9 % (ref 32–34.5)
MCV RBC AUTO: 90.2 FL (ref 80–99.9)
MONOCYTES ABSOLUTE: 0.36 E9/L (ref 0.1–0.95)
MONOCYTES RELATIVE PERCENT: 7 % (ref 2–12)
NEUTROPHILS ABSOLUTE: 3.96 E9/L (ref 1.8–7.3)
NEUTROPHILS RELATIVE PERCENT: 76.5 % (ref 43–80)
PDW BLD-RTO: 13.4 FL (ref 11.5–15)
PLATELET # BLD: 123 E9/L (ref 130–450)
PMV BLD AUTO: 9.8 FL (ref 7–12)
POTASSIUM SERPL-SCNC: 4.1 MMOL/L (ref 3.5–5)
RBC # BLD: 3.69 E12/L (ref 3.8–5.8)
SODIUM BLD-SCNC: 142 MMOL/L (ref 132–146)
TOTAL PROTEIN: 7.6 G/DL (ref 6.4–8.3)
WBC # BLD: 5.2 E9/L (ref 4.5–11.5)

## 2022-10-24 PROCEDURE — 82378 CARCINOEMBRYONIC ANTIGEN: CPT

## 2022-10-24 PROCEDURE — 36415 COLL VENOUS BLD VENIPUNCTURE: CPT

## 2022-10-24 PROCEDURE — 80053 COMPREHEN METABOLIC PANEL: CPT

## 2022-10-24 PROCEDURE — 85025 COMPLETE CBC W/AUTO DIFF WBC: CPT

## 2022-10-25 ENCOUNTER — OFFICE VISIT (OUTPATIENT)
Dept: ONCOLOGY | Age: 72
End: 2022-10-25
Payer: MEDICARE

## 2022-10-25 ENCOUNTER — HOSPITAL ENCOUNTER (OUTPATIENT)
Dept: INFUSION THERAPY | Age: 72
Discharge: HOME | End: 2022-10-25
Payer: MEDICARE

## 2022-10-25 VITALS
BODY MASS INDEX: 30.27 KG/M2 | OXYGEN SATURATION: 99 % | HEART RATE: 59 BPM | TEMPERATURE: 98.6 F | DIASTOLIC BLOOD PRESSURE: 71 MMHG | HEIGHT: 73 IN | SYSTOLIC BLOOD PRESSURE: 135 MMHG | WEIGHT: 228.4 LBS

## 2022-10-25 DIAGNOSIS — C78.7 RECTAL CANCER METASTASIZED TO LIVER (HCC): Primary | ICD-10-CM

## 2022-10-25 DIAGNOSIS — C20 RECTAL CANCER METASTASIZED TO LIVER (HCC): Primary | ICD-10-CM

## 2022-10-25 DIAGNOSIS — C20 RECTAL CANCER (HCC): Primary | ICD-10-CM

## 2022-10-25 PROCEDURE — 1123F ACP DISCUSS/DSCN MKR DOCD: CPT | Performed by: INTERNAL MEDICINE

## 2022-10-25 PROCEDURE — 99214 OFFICE O/P EST MOD 30 MIN: CPT

## 2022-10-25 PROCEDURE — 2580000003 HC RX 258: Performed by: INTERNAL MEDICINE

## 2022-10-25 PROCEDURE — 99214 OFFICE O/P EST MOD 30 MIN: CPT | Performed by: INTERNAL MEDICINE

## 2022-10-25 PROCEDURE — 6360000002 HC RX W HCPCS: Performed by: INTERNAL MEDICINE

## 2022-10-25 PROCEDURE — 96523 IRRIG DRUG DELIVERY DEVICE: CPT

## 2022-10-25 RX ORDER — HEPARIN SODIUM (PORCINE) LOCK FLUSH IV SOLN 100 UNIT/ML 100 UNIT/ML
500 SOLUTION INTRAVENOUS PRN
Status: DISCONTINUED | OUTPATIENT
Start: 2022-10-25 | End: 2022-10-26 | Stop reason: HOSPADM

## 2022-10-25 RX ORDER — HEPARIN SODIUM (PORCINE) LOCK FLUSH IV SOLN 100 UNIT/ML 100 UNIT/ML
500 SOLUTION INTRAVENOUS PRN
Status: CANCELLED | OUTPATIENT
Start: 2022-10-25

## 2022-10-25 RX ORDER — SODIUM CHLORIDE 0.9 % (FLUSH) 0.9 %
10 SYRINGE (ML) INJECTION PRN
Status: CANCELLED | OUTPATIENT
Start: 2022-10-25

## 2022-10-25 RX ORDER — WATER 1000 ML/1000ML
2.2 INJECTION, SOLUTION INTRAVENOUS ONCE
Status: CANCELLED | OUTPATIENT
Start: 2022-10-25 | End: 2022-10-25

## 2022-10-25 RX ORDER — SODIUM CHLORIDE 0.9 % (FLUSH) 0.9 %
10 SYRINGE (ML) INJECTION PRN
Status: DISCONTINUED | OUTPATIENT
Start: 2022-10-25 | End: 2022-10-26 | Stop reason: HOSPADM

## 2022-10-25 RX ADMIN — Medication 500 UNITS: at 08:25

## 2022-10-25 RX ADMIN — SODIUM CHLORIDE, PRESERVATIVE FREE 10 ML: 5 INJECTION INTRAVENOUS at 08:24

## 2022-10-25 NOTE — PROGRESS NOTES
[place] [id number]           Attending Clinic Note     Reason for Visit: Follow-up on a patient with Metastatic Rectal Cancer. PCP: Chase Zamarripa MD     History of Present Illness:  68 y/o  male who was referred to see Dr. Vivek Roberts (GI team) for evaluation of bright red blood per rectum, mild anemia and change in bowel habits with diarrhea. CEA 2640 on 10/19/2015. AlcP 299 AST 57 ALT 75 on 10/19/2015. Colonoscopy in 2013 noted no significant polyps, colitis or lesions at that time. Denies any Family History of colorectal cancer or polyps. Colonoscopy on 10/19/2015 revealed:  1. Ascending polyp, 8 mm, hot snare: Tubulovillous adenoma. 2. Transverse polyp, 1 cm, hot snare: Serrated polyp most consistent with sessile serrated adenoma. 3. Five splenic flexure polyps, three - 5 mm, 7 mm, 8 mm, hot snare and biopsy: Four segments of Tubular Adenoma  4. Descending polyp, 5 mm, biopsy: Tubular adenoma. 5. Sigmoid polyp, 4 mm biopsy, Serrated polyp most consistent with sessile serrated adenoma. 6. Two rectal polyps, 4 mm biopsy: Serrated polyp most consistent with hyperplastic polyp. 7. Rectosigmoid colon mass (large mass approximately 65% circumference of the lumen; Very friable, firm and hard): Tubulovillous adenoma with associated focal erosion and fibroplasia. CT scan abdomen/pelvis on 10/26/2015:  1. Small nodules at lung bases likely represent metastatic colon   cancer. 2. Extensive likely metastatic colon cancer throughout the liver. 3. Mild mural thickening and luminal narrowing in the   terminal ileum, otherwise nonspecific. Colonoscopy with snare removal rectal mass was performed by Dr. Elodia Archibald. Pathology proved:  Rectal polyp: Invasive adenocarcinoma involving villous adenoma and extending to the cauterized edge of excision. KRAS Mutation: Mutation detected.   BRAF Mutation: Mutation not detected. 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 CEA 3488. Bone scan on 11/10/2015 noted no metastatic disease. CT chest on 11/10/2015 revealed Multiple pulmonary nodules in the upper and lower lobes consistent with metastatic disease;   Hepatic metastasis also visualized. 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 Mediport was placed. Cycle # 1 of FOLFOX + Avastin was on 11/23/2015. CEA was 4555 on 11/23/2015. Cycle # 2 of FOLFOX + Avastin was on 12/08/2015. CEA was 3160 on 12/08/2015. Cycle # 3 of FOLFOX + Avastin was on 12/22/2015. CEA was 2516 on 12/21/2015. Cycle # 4 of FOLFOX + Avastin was on 01/05/2016. CEA was 2098 on 01/05/2015. Cycle # 5 of FOLFOX + Avastin was on 01/19/2016. CEA was 1511 on 01/05/2015.     -Bone scan on 01/26/2016 noted no metastatic disease. CT chest on 01/26/2016 revealed Significant response to treatment with no visible residual nodules. CT scan abdomen/pelvis on 01/26/2016 noted Interval decreased size of multiple masses in the liver compatible with treatment response. Continue another 2 months of FOLFOX + Avastin and repeat scans. Cycle # 6 of FOLFOX + Avastin was on 02/02/2016.  on 02/02/2016. Cycle # 7 of FOLFOX + Avastin was on 02/16/2016.  on 02/16/2016. Cycle # 8 of FOLFOX + Avastin was on 03/01/2016.  on 03/01/2016. Cycle # 9 of FOLFOX + Avastin was on 03/15/2016.  on 03/15/2016. Cycle # 10 of FOLFOX + Avastin was on 03/29/2016. .4 on 03/29/2016. Cycle # 11 of FOLFOX + Avastin was on 04/12/2016. .4 on 04/12/2016. Re-staging scans on 04/19/2016: CT Chest: clear lungs; no evidence of recurring pulmonary nodule; CT Abdomen/Pelvis: Further interval decrease in size of the multiple metastatic hepatic lesions, the largest lesion now measures 3.9 x 3.5 cm and previously measured 4.5 cm in maximum diameter.  No mesenteric lymphadenopathy is identified; Bone Scan: No evidence of osseous metastasis. Continue same regimen and re-stage in 2-3 months. Cycle # 12 FOLFOX + Avastin was on 04/26/2016. .5 on 04/26/2016. Cycle # 13 FOLFOX + Avastin was on 05/10/2016. .3 on 05/10/2016. Cycle # 14 FOLFOX+AVASTIN was on 05/24/2016. .5 on 05/24/2016. Cycle # 15 FOLFOX + Avastin was on 06/07/2016. CEA 90.5 on 06/07/2016. Admitted to [facility] 06/13/2016-06/16/2016 for abdominal pain: EGD noted 1.5 cm clean based duodenal bulb ulceration s/p epinephrine and bicap per Dr. Cielo Gibson. No active bleeding. A. Stomach, biopsy: Mild chronic gastritis, immunostain negative for Helicobacter  B. Esophagus, biopsy: Gastric glandular mucosa with prominent intestinal metaplasia ([place]'s epithelium), negative for epithelial dysplasia, esophageal squamous mucosa not identified  Cycle # 16 FOLFOX (discontinued avastin (bevacizumab) given association of peptic ulcer disease and known association of GI perforation) was on 06/21/2016. Cycle # 17 FOLFOX was on 07/05/2016. CEA 52.6 on 07/19/2016. Cycle # 17 FOLFOX was on 07/05/2016. CEA 52.6 on 07/05/2016. CEA 47.6 on 07/19/2016. Re-staging scans 07/19/2106: CT Chest negative for metastatic disease. CT Abdomen/Pelvis: Stable hepatic lesions; Question new mural thickening in the cecum. Bone Scan: New Let hip lesion suspicious for bone metastasis. Increased CEA; new mural thickening in the cecum and new left hip lesion suspicious for bone metastasis are consistent with disease progression; He derived maximum benefit from FOLFOX/AVASTIN. D/C FOLFOX/AVASTIN. We recommended FOLFIRI second line therapy. Cycle # 1 FOLFIRI was on 08/02/2016. CEA 40.8 on 08/02/2016. Xgeva q4 weeks started on 08/02/2016. Cycle # 2 FOLFIRI was on 08/16/2016. CEA 31.7 on 08/16/2016. Cycle # 3 FOLFIRI (added Avastin) was on 08/30/2016 given that ulcers healed on EGD 08/15/2016 by Dr. Liliya Mojica; Protonix bid since avastin re-started.    Colonoscopy on 08/29/2016 (to look at the cecal area) unremarkable. CEA 26.7 on 08/30/2016. Cycle # 4 FOLFIRI/Avastin was on 09/13/2016. CEA 23.4 on 09/13/2016. Cycle # 5 FOLFIRI/Avastin was on 09/27/2016. CEA 22.3 on 09/27/2016. Cycle # 6 FOLFIRI/Avastin was on 10/11/2016. CEA 18.4 on 10/11/2016. Bone scan 10/21/2016 stable bone metastasis; ? New lesion anterior left sixth rib likely interval healing fracture. CT abdomen/pelvis revealed stable hepatic metastasis. CT chest negative for metastatic disease. Continue FOLFIRI/Avastin and repeat scans in 3 months. Cycle # 7 FOLFIRI/Avastin was on 10/25/2016. CEA 16.1  Cycle # 8 FOLFIRI/Avastin was on 11/08/2016. CEA 15.7  Cycle # 9 FOLFIRI/Avastin was on 11/29/2016. CEA 13.6 on 11/29/2016. Cycle # 10 FOLFIRI/Avastin was on 12/13/2016. CEA 10.6 on 12/13/2016. Cycle # 11 FOLFIRI/Avastin was on 12/27/2016. CEA 11.1 on 12/27/2016. Cycle # 12 FOLFIRI/Avastin was on 01/10/2017. CEA 9.7 on 01/10/2017. CT chest 01/23/2017 No new pulmonary nodule or lymphadenopathy. Patchy groundglass opacities within the left lower lobe, suggestive of infectious or inflammatory etiology. Followup CT chest in 3 months. Slight increased linear sclerosis along the left anterior sixth rib corresponding to an area of increased uptake on the prior bone scan from 10/21/2016, favored to be related to interval healing changes of a nondisplaced fracture. CT abdomen/pelvis on 01/23/2017 Redemonstration of multiple hepatic metastases, which are similar compared to the prior CT from 10/21/2016. Redemonstration of area of increased sclerosis along the right acetabulum suspicious for metastatic disease. Bone scan on 01/23/2017 Stable subtle uptake within the left proximal diaphysis, again suggestive of metastatic disease  Decreased subtle uptake within the medial right acetabulum.    Decreased uptake within the left anterior sixth rib corresponding to linear sclerosis on the recent CT, favored to be 10/17/2017. Cycle # 33 FOLFIRI + Avastin was on 10/31/2017. CEA 6.8 on 10/31/2017. Cycle # 34 FOLFIRI + Avastin was on 11/14/2017. CEA 7.2 on 11/14/2017. Cycle # 35 FOLFIRI + Avastin was on 11/28/2017. CEA 5.1 on 11/28/2017. Cycle # 36 FOLFIRI + Avastin was on 12/12/2017. CEA 6.1 on 12/12/2017. Bone scan 12/22/2017 noted no evidence of metastatic disease to the axial or appendicular skeleton. CT chest 12/22/2017 noted no evidence of metastatic disease. CT abdomen/pelvis 12/22/2017 noted stable hypodense lesions in the liver. Continue FOLFIRI + Avastin and repeat scans in 3 months. Cycle # 37 FOLFIRI + Avastin was on 12/27/2018. Cycle # 38 FOLFIRI + Avastin was on 01/09/2018. Cycle # 39 FOLFIRI + Avastin was on 01/23/2018. Cycle # 40 FOLFIRI + Avastin was on 02/06/2018. Cycle # 41 FOLFIRI + Avastin was on 02/20/2018. Cycle # 42 FOLFIRI + Avastin was on 03/06/2018. Cycle # 43 FOLFIRI + Avastin was on 03/20/2018. Bone scan on 03/26/2018 negative for metastatic disease. CT chest 03/26/2018 noted ? new 7 mm nodule in LUCY. CT abdomen/pelvis 03/26/2018 noted stable hypodense lesions in the liver. ? New small ascites in the abdomen. Patient wants to continue to monitor the lung finding and repeat scans in 2 months instead of changing the current regimen into oral Lonsurf. Cycle # 44 FOLFIRI + Avastin was on 04/03/2018. CEA 6.3 on 04/03/2018. Cycle # 45 FOLFIRI + Avastin was on 04/24/2018. CEA 5.3 on 04/24/2018. Cycle # 46 FOLFIRI + Avastin was on 05/08/2018. CEA 5.4 on 05/08/2018. Cycle # 47 FOLFIRI + Avastin was on 05/22/2018. CEA 6.1 on 05/22/2018. CT chest 05/31/2018 noted stable nodule in LUCY. No evidence of worsening malignancy. CT abdomen/pelvis on 05/31/2018 noted stable liver metastasis. No evidence of worsening malignancy. Continue FOLFIRI + Avastin and repeat scans in 3 months. Cycle # 48 FOLFIRI + Avastin was on 06/06/2018. Cycle # 49 FOLFIRI + Avastin was on 06/19/2018. Cycle # 50 FOLFIRI + Avastin was on 07/03/2018. Cycle # 51 FOLFIRI + Avastin was on 07/24/2018. Cycle # 52 FOLFIRI + Avastin was on 08/14/2018. Cycle # 53 FOLFIRI + Avastin was on 08/28/2018. CT chest 09/06/2018 noted interval decrease in size of LUCY measuring up to 4 mm, suggestive of treatment response. No mediastinal or hilar LN  CT abdomen/pelvis 09/06/2018 Slight interval increase in size of a previously noted metastatic lesion within the right hepatic lobe. This may be related to differences in phase of enhancement compared to the most recent study from 5/31/2018, the lesion remains decreased in size compared to the more previous studies. No abdominal, retroperitoneal, or pelvic lymphadenopathy. Continue FOLFIRI + Avastin and repeat scans in 2 months. Cycle # 54 FOLFIRI + Avastin was on 09/11/2018. Cycle # 55 FOLFIRI + Avastin was on 09/25/2018. Cycle # 56 FOLFIRI + Avastin was on 10/09/2018. Cycle # 57 FOLFIRI + Avastin was on 10/23/2018. CT chest 11/02/2018 stable 3 mm nodule within LUCY. No new right and left lung nodule. No mediastinal or osseous lesion. CT abdomen/pelvis 11/02/2018 stable metastatic disease to liver. No new metastatic disease identified. No mesenteric or retroperitoneal LN. No gross colonic lesion identified. Continue FOLFIRI + Avastin and repeat scans in 3 months. Cycle # 58 FOLFIRI + Avastin was on 11/06/2018. CEA 7.6 on 11/05/2018. Cycle # 59 FOLFIRI + Avastin was on 11/27/2018. CEA 6.9 on 11/26/2018. Cycle # 60 FOLFIRI + Avastin was on 12/11/2018. CEA 6.7 on 12/11/2018. Cycle # 61 FOLFIRI + Avastin was on 01/08/2019. CEA 5.6 on 01/07/2019. Cycle # 62 FOLFIRI + Avastin was on 01/29/2019. CEA 4.6 on 01/28/2019. Cycle # 63 FOLFIRI + Avastin was on 02/12/2019. CEA 4.3 on 02/11/2019. CT chest 02/21/2019 no evidence of metastatic disease. CT abdomen/pelvis 02/21/2019 stable hypodense liver lesions. No evidence of worsening metastatic disease.  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 Ordered MRI thoracic spine and referred to neurosurgery team.    Cycle # 64 FOLFIRI + Avastin was on 02/26/2019. CEA 4.7 on 02/25/2019. Cycle # 65 FOLFIRI + Avastin was on 03/12/2019. CEA 4.0 on 03/11/2019. Cycle # 66 FOLFIRI + Avastin was on 03/26/2019. CEA 4.7 on 03/25/2019. Cycle # 67 FOLFIRI + Avastin was on 04/09/2019. CEA 5.6 on 04/08/2019. Cycle # 68 FOLFIRI + Avastin was on 04/30/2019. CEA 4.9 on 04/29/2019. Cycle # 69 FOLFIRI + Avastin was on 05/14/2019. CEA 5.3 on 05/14/2019. CT chest 05/23/2019 stable small lung nodule with no evidence of metastatic disease. CT abdomen/pelvis 05/23/2019 Decrease conspicuity of the liver lesions. No new lesions seen. Stable splenomegaly. Continue FOLFIRI + Avastin and repeat scans in 3 months. Cycle # 70 FOLFIRI + Avastin was on 06/04/2019. CEA 4.8 on 06/03/2019. Cycle # 71 FOLFIRI + Avastin was on 06/18/2019. CEA 4.6 on 06/17/2019. Cycle # 72 FOLFIRI + Avastin was on 07/09/2019. CEA 4.3 on 07/08/2019. Cycle # 73 FOLFIRI + Avastin was on 07/30/2019. CEA 5.0 on 07/29/2019. Cycle # 74 FOLFIRI + Avastin was on 08/13/2019. CEA 5.0 on 08/12/2019. CT chest 08/20/2019 negative  CT abdomen/pelvis 08/20/2019 Previous identified hepatic lesions are not visualized on the current exam.  Continue FOLFIRI + Avastin and repeat scans in 3 months. Cycle # 75 FOLFIRI + Avastin was on 08/27/2019. CEA 5.0 on 08/26/2019. Cycle # 76 FOLFIRI + Avastin was on 09/17/2019. CEA 5.5 on 09/16/2019. Cycle # 77 FOLFIRI + Avastin was on 10/15/2019. CEA 4.6 on 10/15/2019. Cycle # 78 FOLFIRI + Avastin was on 10/29/2019. CEA 4.1 on 10/28/2019. Cycle # 79 FOLFIRI + Avastin was on 11/12/2019. CEA 4.3 on 11/12/2019. Cycle # 80 FOLFIRI + Avastin was on 12/10/2019. CEA 4.0 on 12/09/2019.   CT chest 12/19/2019 Stable 3 mm nodule within the left upper lobe, similar to the previous studies dating back to 11/2/2018, however decreased in size compared to the CT from 3/26/2018. No thoracic LN. CT abdomen/pelvis 12/19/2019 Previously described small hypodense lesions are more conspicuous on the current study compared to the recent study throughout the liver from 8/20/2019, however similar to the prior study from 2/21/2019. Findings may be related to differences in contrast opacification. No abdominal or pelvic lymphadenopathy. Continue FOLFIRI + Avastin and repeat scans in 2 months to f/u on liver lesions. Cycle # 81 FOLFIRI + Avastin was on 01/07/2020. CEA 3.8 on 01/06/2020. Cycle # 82 FOLFIRI + Avastin was on 01/21/2020. CEA 3.7 on 01/20/2020. Cycle # 83 FOLFIRI + Avastin was on 02/04/2020. CEA 4.1 on 02/03/2020. Cycle # 84 FOLFIRI + Avastin was on 02/18/2020. CEA 4.6 on 02/17/2020. EGD by Dr. Samaria Chaudhary 03/02/2020 showing no masses or lesions  Cycle # 85 FOLFIRI + Avastin was on 03/03/2020. CEA 7.4 on 03/02/2020. Cycle # 86 FOLFIRI + Avastin was on 03/17/2020. CEA 4.5 on 03/16/2020. Colonoscopy on 03/23/2020 by Dr. Samaria aguero (records requested). Cycle # 87 FOLFIRI + Avastin was on 03/31/2020. CEA 4.1 on 03/30/2020. Cycle # 88 FOLFIRI + Avastin was on 04/21/2020. CEA 6.4 on 04/20/2020. Cycle # 89 FOLFIRI + Avastin was on 05/05/2020. CEA 5.8 on 05/04/2020. Cycle # 90 FOLFIRI + Avastin was on 06/02/2020. CEA 5.5 on 06/01/2020. Cycle # 91 FOLFIRI + Avastin was on 06/16/2020. CEA 5.6 on 06/15/2020. CT chest 06/23/2020 noted no metastatic disease in the chest.   CT abdomen/pelvis 06/23/2020 Stable small liver lesions measuring up to 5 mm since 2019.   22 mm round mass in segment 8 of the liver with central high density stable from December 2019), previously measuring up to 34 mm in 2017. No additional metastatic disease in the abdomen or pelvis. Small amount of ascites. Overall stable disease. Continue FOLFIRI + Avastin and repeat scans in 2-3 months. Cycle # 92 FOLFIRI + Avastin was on 07/07/2020.  CEA 5.7 on 07/06/2020. Cycle # 93 FOLFIRI + Avastin was on 07/21/2020. CEA 5.9 on 07/20/2020. Cycle # 94 FOLFIRI + Avastin was on 08/04/2020. CEA 5.5 on 08/04/2020. Cycle # 95 FOLFIRI + Avastin was on 08/25/2020. CEA 6.1 on 08/24/2020. CT chest 9/2/2020 stable unremarkable enhanced CT of the thorax. There is no metastatic disease to the lungs. CT abdomen pelvis on 9/2/2020 stable 2.2 cm low attenuated lesion within the central aspect of the right hepatic lobe favored to represent treated metastatic disease. No new hepatic lesion is identified. Stable splenomegaly  There is no appreciable colonic lesion or stricture  Pericholecystic fluid of uncertain etiology. General surgery team consulted. Laparoscopic cholecystectomy with normal cholangiogram 10/27/20  Gallbladder: Chronic cholecystitis and cholelithiasis. Unremarkable regional lymph node. 11/13/2020; Seen by Dr. Ofelia Mendez from General surgery team; cleared to re-start chemotherapy. Cycle # 96 FOLFIRI + Avastin was on 11/17/2020. CEA 3.6 on 11/16/2020. Cycle # 97 FOLFIRI + Avastin was on 12/01/2020. CEA 3.5 on 11/30/2020. Cycle # 98 FOLFIRI + Avastin was on 12/15/2020. CEA 4.3 on 12/15/2020. Cycle # 99 FOLFIRI + Avastin was on 01/05/2021. CEA 4.7 on 01/04/2021. CT chest 01/13/2021 negative for metastatic disease. CT abdomen/pelvis 01/13/2021 Unchanged central hepatic lesion. No specific signs of abdominopelvic metastatic disease. Continue FOLFIRI + Avastin and repeat scans in 3 months. Cycle # 100 FOLFIRI + Avastin was on 01/19/2021. CEA 4.7 on 01/18/2021. Cycle # 101 FOLFIRI + Avastin was on 02/02/2021. CEA 5.2 on 02/01/2021. Cycle # 102 FOLFIRI + Avastin was on 02/16/2021. CEA 5.6 on 02/15/2021. Cycle # 103 FOLFIRI + Avastin was on 03/02/2021. Cycle # 104 FOLFIRI (20% dose reduced due to significant toxicity) + Avastin was on 03/16/2021. Cycle # 105 FOLFIRI (same dose as cycle # 104) + Avastin was on 03/30/2021.  CEA 6.5 on 03/29/2021. Cycle # 106 FOLFIRI (same dose as cycle # 104) + Avastin was on 04/13/2021. CEA 6.0 on 04/12/2021  CT chest 04/20/2021 no evidence of metastatic disease. CT abdomen/pelvis 04/20/2021 No evidence of progressive metastatic disease. Stable 2 cm lesion in the right lobe of the liver, likely representing treated metastatic disease. Imaging reviewed. Continue FOLFIRI + Avastin and repeat scans in 3 months  Cycle # 107 FOLFIRI + Avastin was on 04/27/2021  Cycle # 108 FOLFIRI + Avastin was on 05/11/2021. Cycle # 109 FOLFIRI (held Avastin due to upcoming surgery) on 05/25/2021. CEA 6.3 on 5/24/21  Cycle # 110 FOLFIRI (held Avastin due to upcoming surgery) on 06/08/2021. CEA 6.3 on 6/07/21. Right inguinal/scrotol hernia repair on 07/13/2021 with Dr Kiara Ramirez with folely removal on Friday 07/23/2021. He developed fever on 07/24/2021 and was brought to [address] [place], [place] [address] ER via EMS; Sepsis secondary to urinary tract infection   Completed Abx  CEA 4.1 on 08/30/2021  Cycle # 111 FOLFIRI was on 08/31/2021. HBP team for evaluation of liver lesions; His lesion does appear to be a hemangioma as his prior metastatic deposits in his liver were hypo attenuating compared to this CT which is hyper attenuating. compared to his prior CT scans he has disappearing liver metastasis. MRI liver on 09/17/2021 No suspicious liver lesion identified. Colonoscopy GI Dr. Kimi Cho on 09/20/2021 unremarkable  CEA 3.3 on 09/27/2021. CEA 3.2 on 10/25/2021. CEA 2.7 on 11/22/2021. CEA 2.5 on 12/27/2021. CEA 2.4 on 01/24/2022. CEA 2.3 on 02/21/2022. CEA 2.4 on 03/21/2022. CEA 2.2 on 04/18/2022  CEA 2.2 on 05/16/2022  CEA 2.3 on 06/06/2022  HBP note 06/14/2022 reviewed. No evidence of recurrence on repeat imaging.   CEA 2.4 on 07/02/2022  CEA 2.3 on 08/01/2022  CEA 2.1 on 08/29/2022    CT chest 09/21/2022: No evidence of metastatic disease  CT abdomen/pelvis 09/21/2022: Previously measured areas in the liver are essentially unchanged and indeterminate with no significant hypermetabolism on recent PET-CT. No new hepatic lesion. No evidence for recurrent or metastatic disease within the abdomen or pelvis. CEA 2.1 on 09/26/2022. CEA 1.7 on 10/24/2022. Today 10/24/2022; no fever chills. Fair appetite and energy level. No chest pain or shortness of breath. No nausea vomiting. Intermittent arthralgias    Review of Systems;  CONSTITUTIONAL: No fever, chills. Fair appetite and energy level  ENMT: Eyes: No diplopia; Nose: No epistaxis. Mouth:No lesions  RESPIRATORY: No hemoptysis, shortness of breath. CARDIOVASCULAR: No chest pain, palpitations. GASTROINTESTINAL:No N/V, abdominal pain. GENITOURINARY: No dysuria, urinary frequency, hematuria. MSK: Intermittent Arthritis   NEURO: No syncope, presyncope, headache. Remainder ROS: Negative. Past Medical History:   Past Medical History               Diagnosis Date    Arthritis      Cancer ([facility] [address]         colon    Depression      Hyperlipidemia      Hypertension           Medications:  Reviewed and reconciled. Allergies: Allergies   Allergen Reactions    Neosporin [Neomycin-Polymyxin-Gramicidin] Rash    Tape Alireza Braddock Tape] Rash      Physical Exam:  Pulse 59   Temp 98.6 °F (37 °C)   Ht 6' 1\" (1.854 m)   Wt 228 lb 6.4 oz (103.6 kg)   SpO2 99%   BMI 30.13 kg/m²   GENERAL: Alert, oriented x 3, not in distress  HEENT: PERRLA, EOMI. No oral lesions   NECK: Supple. Without lymphadenopathy. LUNGS: CTA bilaterally, with no wheezing, crackles or rhonchi. CARDIOVASCULAR: Regular rhythm. No murmurs, rubs or gallops. ABDOMEN: Soft. Non-tender, non-distended. EXTREMITIES: Without clubbing, cyanosis  NEUROLOGIC: No focal deficits.    ECOG PS 1    Diagnostics:  Lab Results   Component Value Date    WBC 5.2 10/24/2022    HGB 11.3 (L) 10/24/2022    HCT 33.3 (L) 10/24/2022    MCV 90.2 10/24/2022     (L) 10/24/2022     Lab Results   Component Value Date     10/24/2022    K 4.1 10/24/2022     10/24/2022    CO2 24 10/24/2022    BUN 22 10/24/2022    CREATININE 1.4 (H) 10/24/2022    GLUCOSE 107 (H) 10/24/2022    CALCIUM 9.7 10/24/2022    PROT 7.6 10/24/2022    LABALBU 4.1 10/24/2022    BILITOT 0.5 10/24/2022    ALKPHOS 142 (H) 10/24/2022    AST 19 10/24/2022    ALT 14 10/24/2022    LABGLOM 53 10/24/2022    GFRAA >60 09/26/2022     Lab Results   Component Value Date    CEA 1.7 10/24/2022     Impression/Plan:  68 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 KRAS Mutation: Mutation detected. BRAF Mutation: Mutation not detected. 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 CT scan abdomen/pelvis on 10/26/2015 revealed small nodules at the lung bases, extensive liver lesions consistent with metastatic rectosigmoid cancer. CEA 3488 on 10/30/2015. Bone scan on 11/10/2015 noted no metastatic disease. CT chest on 11/10/2015 revealed Multiple pulmonary nodules in the upper and lower lobes consistent with metastatic disease; Hepatic metastasis also visualized. 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 Mediport was placed. Cycle # 1 of FOLFOX + Avastin was on 11/23/2015. CEA was 4555 on 11/23/2015. Cycle # 2 of FOLFOX + Avastin was on 12/08/2015. CEA was 3160 on 12/08/2015. Cycle # 3 of FOLFOX + Avastin was on 12/22/2015. CEA was 2516 on 12/21/2015. Cycle # 4 of FOLFOX + Avastin was on 01/05/2016. CEA was 2098 on 01/05/2015. Cycle # 5 of FOLFOX + Avastin was on 01/19/2016. CEA was 1511 on 01/05/2015.     -Bone scan on 01/26/2016 noted no metastatic disease. CT chest on 01/26/2016 revealed Significant response to treatment with no visible residual nodules. CT scan abdomen/pelvis on 01/26/2016 noted Interval decreased size of multiple masses in the liver compatible with treatment response. Continue another 2 months of FOLFOX + Avastin and repeat scans.   Cycle # 6 of FOLFOX + Avastin was on hepatic lesions; Question new mural thickening in the cecum. Bone Scan: New Let hip lesion suspicious for bone metastasis. Increased CEA; new mural thickening in the cecum and new left hip lesion suspicious for bone metastasis are consistent with disease progression; He derived maximum benefit from FOLFOX/AVASTIN. D/C FOLFOX/AVASTIN. We recommended FOLFIRI second line therapy. Cycle # 1 FOLFIRI was on 08/02/2016. CEA 40.8 on 08/02/2016. Xgeva q4 weeks started on 08/02/2016. Cycle # 2 FOLFIRI was on 08/16/2016. CEA 31.7 on 08/16/2016. Cycle # 3 FOLFIRI (added Avastin) was on 08/30/2016 given that ulcers healed on EGD 08/15/2016 by Dr. Marilee Wright; Protonix bid since avastin re-started. Colonoscopy on 08/29/2016 (to look at the cecal area) unremarkable. CEA 26.7 on 08/30/2016. Cycle # 4 FOLFIRI/Avastin was on 09/13/2016. CEA 23.4 on 09/13/2016. Cycle # 5 FOLFIRI/Avastin was on 09/27/2016. CEA 22.3 on 09/27/2016. Cycle # 6 FOLFIRI/Avastin was on 10/11/2016. CEA 18.4 on 10/11/2016. Bone scan 10/21/2016 stable bone metastasis; ? New lesion anterior left sixth rib likely interval healing fracture. CT abdomen/pelvis revealed stable hepatic metastasis. CT chest negative for metastatic disease. Continue FOLFIRI/Avastin and repeat scans in 3 months. Cycle # 7 FOLFIRI/Avastin was on 10/25/2016. CEA 16.1 on 10/25/2016. Cycle # 8 FOLFIRI/Avastin was on 11/08/2016. CEA 15.7 on 11/08/2016. Cycle # 9 FOLFIRI/Avastin was on 11/29/2016. CEA 13.6 on 11/29/2016. Cycle # 10 FOLFIRI/Avastin was on 12/13/2016. CEA 10.6 on 12/13/2016. Cycle # 11 FOLFIRI/Avastin was on 12/27/2016. CEA 11.1 on 12/27/2016. Cycle # 12 FOLFIRI/Avastin was on 01/10/2017. CEA 9.7 on 01/10/2017. CT chest 01/23/2017 No new pulmonary nodule or lymphadenopathy. Patchy groundglass opacities within the left lower lobe, suggestive of infectious or inflammatory etiology. Followup CT chest in 3 months.  Slight increased linear sclerosis along the left anterior sixth rib corresponding to an area of increased uptake on the prior bone scan from 10/21/2016, favored to be related to interval healing changes of a nondisplaced fracture. CT abdomen/pelvis on 01/23/2017 Redemonstration of multiple hepatic metastases, which are similar compared to the prior CT from 10/21/2016. Redemonstration of area of increased sclerosis along the right acetabulum suspicious for metastatic disease. Bone scan on 01/23/2017 Stable subtle uptake within the left proximal diaphysis, again suggestive of metastatic disease  Decreased subtle uptake within the medial right acetabulum. Decreased uptake within the left anterior sixth rib corresponding to linear sclerosis on the recent CT, favored to be related to an joint rib fracture. Continue FOLFIRI + Avastin and repeat scans in 3 months. Cycle # 13 FOLFIRI + Avastin was on 01/24/2017. Cycle # 14 FOLFIRI + Avastin was on 02/07/2017. Cycle # 15 FOLFIRI + Avastin was on 02/21/2017. Cycle # 16 FOLFIRI + Avastin was on 03/07/2017. Cycle # 17 FOLFIRI + Avastin was on 03/21/2017. Cycle # 18 FOLFIRI + Avastin was on 04/04/2017. CT chest 04/17/2017 negative for metastatic disease. CT abdomen/pelvis 04/17/2017 Stable hypodense metastatic lesions within the liver. Stable area of increased sclerosis along the right acetabulum  Bone scan 04/17/2017 Stable subtle uptake within the left proximal femoral diaphysis; No definite abnormal uptake in the region of a sclerotic lesion along the posterior column of the right acetabulum noted on CT. Stable slight uptake within the left anterior sixth rib corresponding to linear sclerosis on the recent CT, favored to be related to a fracture. Continue FOLFIRI + Avastin and repeat scans in 3 months. Cycle # 19 FOLFIRI + Avastin was on 04/18/2017. CEA 7.5 on 04/18/2017. Cycle # 20 FOLFIRI + Avastin was on 05/02/2017. CEA 7.7 on 05/02/2017.     Cycle # 21 FOLFIRI + Avastin was on 05/16/2017. CEA 7.1 on 05/16/2017. Cycle # 22 FOLFIRI + Avastin was on 05/30/2017. CEA 8.2 on 05/30/2017. Cycle # 23 FOLFIRI + Avastin was on 06/13/2017. CEA 7.7 on 06/13/2017. Cycle # 24 FOLFIRI + Avastin was on 06/27/2017. CEA 6.6 on 06/27/2017. Re-staging scans 07/05/2017:  Bone scan stable proximal left femoral diaphysis, right acetabulum, and left anterior sixth rib lesions;  CT abdomen/pelvis stable hypodense metastatic lesions within the liver; CT chest without convincing evidence of metastatic disease. Cycle # 25 FOLFIRI + Avastin was on 07/11/2017. CEA 6.3 on 07/11/2017. Cycle # 26 FOLFIRI + Avastin was on 07/25/2017. CEA 7.3 on 07/25/2017. Cycle # 27 FOLFIRI + Avastin was on 08/08/2017. CEA 6.5 on 08/08/2017. Cycle # 28 FOLFIRI + Avastin was on 08/22/2017. CEA 5.9 on 08/22/2017. Cycle # 29 FOLFIRI + Avastin was on 09/05/2017. CEA 6.3 on 09/05/2017. Cycle # 30 FOLFIRI + Avastin was on 09/19/2017. CEA 6.3 on 09/19/2017. Bone scan 09/26/2017 stable. CT abdomen/pelvis on 09/26/2017 Stable hypodense mass in the liver. Stable sclerotic lesion in the acetabulum. CT chest 09/26/2017 negative for metastatic disease. Cycle # 31 FOLFIRI + Avastin was on 10/03/2017. CEA 7.4 on 10/03/2017. Cycle # 32 FOLFIRI + Avastin was on 10/17/2017. CEA 6.0 on 10/17/2017. Cycle # 33 FOLFIRI + Avastin was on 10/31/2017. CEA 6.8 on 10/31/2017. Cycle # 34 FOLFIRI + Avastin was on 11/14/2017. CEA 7.2 on 11/14/2017. Cycle # 35 FOLFIRI + Avastin was on 11/28/2017. CEA 5.1 on 11/28/2017. Cycle # 36 FOLFIRI + Avastin was on 12/12/2017. CEA 6.1 on 12/12/2017. Bone scan 12/22/2017 noted no evidence of metastatic disease to the axial or appendicular skeleton. CT chest 12/22/2017 noted no evidence of metastatic disease. CT abdomen/pelvis 12/22/2017 noted stable hypodense lesions in the liver. Continue FOLFIRI + Avastin and repeat scans in 3 months. Cycle # 37 FOLFIRI + Avastin was on 12/27/2018. CEA 6.7 on 12/27/2017. Cycle # 38 FOLFIRI + Avastin was on 01/09/2018. CEA 5.0 on 01/09/2018. Cycle # 39 FOLFIRI + Avastin was on 01/23/2018. CEA 6.5 on 01/23/2018. Cycle # 40 FOLFIRI + Avastin was on 02/06/2018. CEA 6.9 on 02/06/2018. Cycle # 41 FOLFIRI + Avastin was on 02/20/2018. CEA 6.4 on 02/20/2018. Cycle # 42 FOLFIRI + Avastin was on 03/06/2018. CEA 6.6 on 03/06/2018. Cycle # 43 FOLFIRI + Avastin was on 03/20/2018. CEA 5.7 on 03/20/2018. Bone scan on 03/26/2018 negative for metastatic disease. CT chest 03/26/2018 noted ? new 7 mm nodule in LUCY. CT abdomen/pelvis 03/26/2018 noted stable hypodense lesions in the liver. ? New small ascites in the abdomen. Patient wants to continue to monitor the lung finding and repeat scans in 2 months instead of changing the current chemotherapy regimen to oral Lonsurf. Cycle # 44 FOLFIRI + Avastin was on 04/03/2018. CEA 6.3 on 04/03/2018. Cycle # 45 FOLFIRI + Avastin was on 04/24/2018. CEA 5.3 on 04/24/2018. Cycle # 46 FOLFIRI + Avastin was on 05/08/2018. CEA 5.4 on 05/08/2018. Cycle # 47 FOLFIRI + Avastin was on 05/22/2018. CEA 6.1 on 05/22/2018. CT chest 05/31/2018 noted stable nodule in LUCY. No evidence of worsening malignancy. CT abdomen/pelvis on 05/31/2018 noted stable liver metastasis. No evidence of worsening malignancy. Continue FOLFIRI + Avastin and repeat scans in 3 months. Cycle # 48 FOLFIRI + Avastin was on 06/06/2018. CEA 6.3 on 06/05/2018. Cycle # 49 FOLFIRI + Avastin was on 06/19/2018. CEA 6.1 on 06/19/2018. Cycle # 50 FOLFIRI + Avastin was on 07/03/2018. CEA 7.4 on 07/03/2018. Cycle # 51 FOLFIRI + Avastin was on 07/24/2018. CEA 6.7 on 07/24/2018. Cycle # 52 FOLFIRI + Avastin was on 08/14/2018. CEA 6.8 on 08/14/2018. Cycle # 53 FOLFIRI + Avastin was on 08/28/2018. CEA 6.5 on 08/27/2018. CT chest 09/06/2018 noted interval decrease in size of LUCY measuring up to 4 mm, suggestive of treatment response.  No mediastinal or hilar LN  CT abdomen/pelvis 09/06/2018 Slight interval increase in size of a previously noted metastatic lesion within the right hepatic lobe. This may be related to differences in phase of enhancement compared to the most recent study from 5/31/2018, the lesion remains decreased in size compared to the more previous studies. No abdominal, retroperitoneal, or pelvic lymphadenopathy. Continue FOLFIRI + Avastin and repeat scans in 2 months. Cycle # 54 FOLFIRI + Avastin was on 09/11/2018. CEA 6.4 on 09/10/2018. Cycle # 55 FOLFIRI + Avastin was on 09/25/2018. CEA 6.4 on 09/25/2018. Cycle # 56 FOLFIRI + Avastin was on 10/09/2018. CEA 6.7 on 10/08/2018. Cycle # 57 FOLFIRI + Avastin was on 10/23/2018. CEA 7.2 on 10/22/2018. CT chest 11/02/2018 stable 3 mm nodule within LUCY. No new right and left lung nodule. No mediastinal or osseous lesion. CT abdomen/pelvis 11/02/2018 stable metastatic disease to liver. No new metastatic disease identified. No mesenteric or retroperitoneal LN. No gross colonic lesion identified. Continue FOLFIRI + Avastin and repeat scans in 3 months. Cycle # 58 FOLFIRI + Avastin was on 11/06/2018. CEA 7.6 on 11/05/2018. Cycle # 59 FOLFIRI + Avastin was on 11/27/2018. CEA 6.9 on 11/26/2018. Cycle # 60 FOLFIRI + Avastin was on 12/11/2018. CEA 6.7 on 12/11/2018. Cycle # 61 FOLFIRI + Avastin was on 01/08/2019. CEA 5.6 on 01/07/2019. Cycle # 62 FOLFIRI + Avastin was on 01/29/2019. CEA 4.6 on 01/28/2019. Cycle # 63 FOLFIRI + Avastin was on 02/12/2019. CEA 4.3 on 02/11/2019. CT chest 02/21/2019 no evidence of metastatic disease. CT abdomen/pelvis 02/21/2019 stable hypodense liver lesions. No evidence of worsening metastatic disease. 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 Ordered MRI thoracic spine and referred to neurosurgery team.  Cycle # 64 FOLFIRI + Avastin was on 02/26/2019. CEA 4.7 on 02/25/2019. Cycle # 65 FOLFIRI + Avastin was on 03/12/2019.  CEA similar to the prior study from 2/21/2019. Findings may be related to differences in contrast opacification. No abdominal or pelvic lymphadenopathy. Continue FOLFIRI + Avastin and repeat scans in 2 months to f/u on liver lesions. Cycle # 81 FOLFIRI + Avastin was on 01/07/2020. CEA 3.8 on 01/06/2020. Cycle # 82 FOLFIRI + Avastin was on 01/21/2020. CEA 3.7 on 01/20/2020. Cycle # 83 FOLFIRI + Avastin was on 02/04/2020. CEA 4.1 on 02/03/2020. Cycle # 84 FOLFIRI + Avastin was on 02/18/2020. CEA 4.6 on 02/17/2020. CT chest 2/28/2020 no evidence of metastatic disease to the lungs and no mediastinal or hilar adenopathy. CT abdomen pelvis 2/28/2020 no enhancing lesions seen within the liver to suggest metastatic disease. Wall thickening of the rectosigmoid junction. Images reviewed. Continue FOLFIRI Avastin and repeat scans in 3 months  EGD by Dr. Bridger Gore 03/02/2020 showing no masses or lesions. Cycle # 85 FOLFIRI + Avastin was on 03/03/2020. CEA 7.4 on 03/02/2020. Cycle # 86 FOLFIRI + Avastin was on 03/17/2020. CEA 4.5 on 03/16/2020. Colonoscopy on 03/23/2020 by Dr. Bridger Gore unremarkable (records requested). Cycle # 87 FOLFIRI + Avastin was on 03/31/2020. CEA 4.1 on 03/30/2020. Cycle # 88 FOLFIRI + Avastin was on 04/21/2020. CEA 6.4 on 04/20/2020. Cycle # 89 FOLFIRI + Avastin was on 05/05/2020. CEA 5.8 on 05/04/2020. Cycle # 90 FOLFIRI + Avastin was on 06/02/2020. CEA 5.5 on 06/01/2020. Cycle # 91 FOLFIRI + Avastin was on 06/16/2020. CEA 5.6 on 06/15/2020. CT chest 06/23/2020 noted no metastatic disease in the chest.   CT abdomen/pelvis 06/23/2020 Stable small liver lesions measuring up to 5 mm since 2019.   22 mm round mass in segment 8 of the liver with central high density stable from December 2019), previously measuring up to 34 mm in 2017. No additional metastatic disease in the abdomen or pelvis. Small amount of ascites. Overall stable disease.  Continue FOLFIRI + Avastin and repeat scans in 2-3 months. Cycle # 92 FOLFIRI + Avastin was on 07/07/2020. CEA 5.7 on 07/06/2020. Cycle # 93 FOLFIRI + Avastin was on 07/21/2020. CEA 5.9 on 07/20/2020. Cycle # 94 FOLFIRI + Avastin was on 08/04/2020. CEA 5.5 on 08/04/2020. Cycle # 95 FOLFIRI + Avastin was on 08/25/2020. CEA 6.1 on 08/24/2020. CT chest 9/2/2020 stable unremarkable enhanced CT of the thorax. There is no metastatic disease to the lungs. CT abdomen pelvis on 9/2/2020 stable 2.2 cm low attenuated lesion within the central aspect of the right hepatic lobe favored to represent treated metastatic disease. No new hepatic lesion is identified. Stable splenomegaly  There is no appreciable colonic lesion or stricture. Pericholecystic fluid of uncertain etiology. Laparoscopic cholecystectomy with normal cholangiogram 10/27/20  Gallbladder: Chronic cholecystitis and cholelithiasis. Unremarkable regional lymph node. 11/13/2020; Seen by Dr. Kiara Ramirez from General surgery team; cleared to re-start chemotherapy. Cycle # 96 FOLFIRI + Avastin was on 11/17/2020. CEA 3.6 on 11/16/2020. Cycle # 97 FOLFIRI + Avastin was on 12/01/2020. CEA 3.5 on 11/30/2020. Cycle # 98 FOLFIRI + Avastin was on 12/15/2020. CEA 4.3 on 12/15/2020. Cycle # 99 FOLFIRI + Avastin was on 01/05/2021. CEA 4.7 on 01/04/2021. CT chest 01/13/2021 negative for metastatic disease. CT abdomen/pelvis 01/13/2021 Unchanged central hepatic lesion. No specific signs of abdominopelvic metastatic disease. Continue FOLFIRI + Avastin and repeat scans in 3 months. Cycle # 100 FOLFIRI + Avastin was on 01/19/2021. CEA 4.7 on 01/18/2021. Cycle # 101 FOLFIRI + Avastin was on 02/02/2021. CEA 5.2 on 02/01/2021. Cycle # 102 FOLFIRI + Avastin was on 02/16/2021. CEA 5.6 on 02/15/2021. Cycle # 103 FOLFIRI + Avastin was on 03/02/2021. Cycle # 104 FOLFIRI (20% dose reduced due to significant toxicity) + Avastin was on 03/16/2021.   Cycle # 105 FOLFIRI (same dose as cycle # 104) + Avastin was on 03/30/2021. CEA 6.5 on 03/29/2021. Cycle # 106 FOLFIRI (same dose as cycle # 104) + Avastin was on 04/13/2021. CEA 6.0 on 04/12/2021  CT chest 04/20/2021 no evidence of metastatic disease. CT abdomen/pelvis 04/20/2021 No evidence of progressive metastatic disease. Stable 2 cm lesion in the right lobe of the liver, likely representing treated metastatic disease. Imaging reviewed. Continue FOLFIRI + Avastin and repeat scans in 3 months  Cycle # 107 FOLFIRI + Avastin was on 04/27/2021  Cycle # 108 FOLFIRI + Avastin was on 05/11/2021. CEA 6.4 on 05/10/2021. Cycle # 109 FOLFIRI (held Avastin due to upcoming surgery) on 05/25/2021. CEA 6.3 on 5/24/21  Cycle # 110 FOLFIRI (held Avastin due to upcoming surgery) on 06/08/2021. CEA 6.3 on 6/07/21. CEA 5.8 on 06/28/2021. Right inguinal/scrotol hernia repair on 07/13/2021 with Dr Hanna Pierre with folely removal on 07/23/2021. He developed fever on 07/24/2021 and was brought to [address] [place], [place] [address] ER via EMS; Sepsis secondary to urinary tract infection; Completed Abx  CT chest 08/13/2021 no sign of recurrent or metastatic disease. CT abdomen/pelvis 08/13/2021 unchanged hepatic lesions. Splenomegaly and secondary signs of portal venous hypertension  CEA 4.0 on 08/16/2021  CEA 4.1 on 08/30/2021  Cycle # 111 FOLFIRI was on 08/31/2021. HBP team for evaluation of liver lesions; His lesion does appear to be a hemangioma as his prior metastatic deposits in his liver were hypo attenuating compared to this CT which is hyper attenuating. compared to his prior CT scans he has disappearing liver metastasis. MRI liver on 09/17/2021 No suspicious liver lesion identified. Colonoscopy GI Dr. Sahra Duque on 09/20/2021 unremarkable  CEA 3.3 on 09/27/2021. CEA 3.2 on 10/25/2021. CT chest 11/08/2021: Similar chronic appearing findings. No acute process or evidence of metastatic disease identified. CT Abdomen/pelvis 11/08/2021: No significant change in the appearance of the liver. Similar appearance of splenomegaly. No interval change. CEA 2.7 on 11/22/2021. CEA 2.5 on 12/27/2021  CEA 2.4 on 01/24/2022  CT chest/abdomen/pelvis 02/04/2022 Stable CT of the chest abdomen/ pelvis with the a 1.4 cm enhancing nodule in the right hepatic lobe which is marginally improved. Imaging reviewed. CEA 2.3 on 02/21/2022  CEA 2.4 on 03/21/2022  CEA 2.2 on 04/18/2022  CT chest abdomen pelvis 5/11/2022: progressive hepatic metastasis with increasing number and prominence of the hepatic lesions  Soft tissue density in the ileocecal valve probably fecal matter. CEA 2.2 on 05/16/2022  CEA 2.3 on 06/06/2022  PET/CT scan 06/06/2022 the recently described hepatic lesions on CT are not significantly hypermetabolic on PET relative to hepatic background activity. No generalized pattern of FDG avid metastatic disease  Imaging reviewed. HBP noted on 06/14/2022 reviewed; No evidence of any recurrence on repeat imaging. Evaluated by Dr. Alok Mcneal on 06/20/2022  CEA 2.4 on 07/02/2022  Colonoscopy on July 11, 2022 noted colon polyp x1 in the descending colon removed by cold snare polypectomy  Mild diverticulosis of the sigmoid colon  Previous cancer site and spot tattoo near the rectosigmoid junction with no gross recurrence in the lumen of the colon with biopsies of the mucosa taken  Small internal and external hemorrhoids  A. Descending colon polyp polypectomy: Tubular adenoma  B. Previous cancer SPOT biopsy:   Colonic mucosa with no significant pathologic changes  Negative for malignancy  Recommend follow-up colonoscopy in 2 years for surveillance  Operative note reviewed. Pathology reviewed. CEA 2.3 on 08/01/2022  CEA 2.1 on 08/29/2022  CT chest 09/21/2022: No evidence of metastatic disease  CT abdomen/pelvis 09/21/2022: Previously measured areas in the liver are essentially unchanged and indeterminate with no significant hypermetabolism on recent PET-CT. No new hepatic lesion.   No evidence for recurrent or metastatic disease within the abdomen or pelvis. CEA 2.1 on 09/26/2022. CEA 1.7 on 10/24/2022. Imaging reviewed. Labs reviewed. No evidence of recurrent or metastatic disease. RTC 1 month with labs. Intermittent arthralgias; recommended NSAIDS prn    MSI testing noted no mismatch repair protein loss of expression  NGS testing ([employer])   MS-Stable: No therapies or clinical trials  TMB 3Muts/Mb: No therapies or clinical trials  KRAS G12R: Therapies with clinical relevance in patient's tumor type: Cetuximab, Panitumumab  Therapies with clinical relevance in other tumor type: None  NRAS wild type:  Therapies with clinical relevance in patient's tumor type: Cetuximab, Panitumumab  Therapies with clinical relevance in other tumor type: None  APC: None None  MARIELA: None None    10/25/2022  Manuel Jurado MD  Board Certified Medical Oncologist

## 2022-11-18 DIAGNOSIS — C20 RECTAL CANCER (HCC): Primary | ICD-10-CM

## 2022-11-21 ENCOUNTER — HOSPITAL ENCOUNTER (OUTPATIENT)
Age: 72
Discharge: HOME | End: 2022-11-21
Payer: MEDICARE

## 2022-11-21 DIAGNOSIS — C20 RECTAL CANCER (HCC): ICD-10-CM

## 2022-11-21 LAB
ALBUMIN SERPL-MCNC: 4 G/DL (ref 3.5–5.2)
ALP BLD-CCNC: 136 U/L (ref 40–129)
ALT SERPL-CCNC: 16 U/L (ref 0–40)
ANION GAP SERPL CALCULATED.3IONS-SCNC: 10 MMOL/L (ref 7–16)
AST SERPL-CCNC: 20 U/L (ref 0–39)
BASOPHILS ABSOLUTE: 0.06 E9/L (ref 0–0.2)
BASOPHILS RELATIVE PERCENT: 0.9 % (ref 0–2)
BILIRUB SERPL-MCNC: 0.7 MG/DL (ref 0–1.2)
BUN BLDV-MCNC: 22 MG/DL (ref 6–23)
CALCIUM SERPL-MCNC: 9.7 MG/DL (ref 8.6–10.2)
CEA: 2.3 NG/ML (ref 0–5.2)
CHLORIDE BLD-SCNC: 103 MMOL/L (ref 98–107)
CO2: 24 MMOL/L (ref 22–29)
CREAT SERPL-MCNC: 1.1 MG/DL (ref 0.7–1.2)
EOSINOPHILS ABSOLUTE: 0.06 E9/L (ref 0.05–0.5)
EOSINOPHILS RELATIVE PERCENT: 0.9 % (ref 0–6)
GFR SERPL CREATININE-BSD FRML MDRD: >60 ML/MIN/1.73
GLUCOSE BLD-MCNC: 138 MG/DL (ref 74–99)
HCT VFR BLD CALC: 34.6 % (ref 37–54)
HEMOGLOBIN: 11.7 G/DL (ref 12.5–16.5)
LYMPHOCYTES ABSOLUTE: 0.45 E9/L (ref 1.5–4)
LYMPHOCYTES RELATIVE PERCENT: 7 % (ref 20–42)
MCH RBC QN AUTO: 30.1 PG (ref 26–35)
MCHC RBC AUTO-ENTMCNC: 33.8 % (ref 32–34.5)
MCV RBC AUTO: 88.9 FL (ref 80–99.9)
MONOCYTES ABSOLUTE: 0.06 E9/L (ref 0.1–0.95)
MONOCYTES RELATIVE PERCENT: 0.9 % (ref 2–12)
NEUTROPHILS ABSOLUTE: 5.76 E9/L (ref 1.8–7.3)
NEUTROPHILS RELATIVE PERCENT: 90.4 % (ref 43–80)
OVALOCYTES: ABNORMAL
PDW BLD-RTO: 13.5 FL (ref 11.5–15)
PLATELET # BLD: 120 E9/L (ref 130–450)
PMV BLD AUTO: 9.5 FL (ref 7–12)
POIKILOCYTES: ABNORMAL
POTASSIUM SERPL-SCNC: 4.1 MMOL/L (ref 3.5–5)
RBC # BLD: 3.89 E12/L (ref 3.8–5.8)
SODIUM BLD-SCNC: 137 MMOL/L (ref 132–146)
TEAR DROP CELLS: ABNORMAL
TOTAL PROTEIN: 7.2 G/DL (ref 6.4–8.3)
WBC # BLD: 6.4 E9/L (ref 4.5–11.5)

## 2022-11-21 PROCEDURE — 85025 COMPLETE CBC W/AUTO DIFF WBC: CPT

## 2022-11-21 PROCEDURE — 80053 COMPREHEN METABOLIC PANEL: CPT

## 2022-11-21 PROCEDURE — 82378 CARCINOEMBRYONIC ANTIGEN: CPT

## 2022-11-21 PROCEDURE — 36415 COLL VENOUS BLD VENIPUNCTURE: CPT

## 2022-11-22 ENCOUNTER — OFFICE VISIT (OUTPATIENT)
Dept: ONCOLOGY | Age: 72
End: 2022-11-22
Payer: MEDICARE

## 2022-11-22 ENCOUNTER — HOSPITAL ENCOUNTER (OUTPATIENT)
Dept: INFUSION THERAPY | Age: 72
Discharge: HOME | End: 2022-11-22
Payer: MEDICARE

## 2022-11-22 VITALS
OXYGEN SATURATION: 98 % | DIASTOLIC BLOOD PRESSURE: 79 MMHG | HEIGHT: 73 IN | WEIGHT: 234.3 LBS | TEMPERATURE: 97.3 F | HEART RATE: 59 BPM | SYSTOLIC BLOOD PRESSURE: 144 MMHG | BODY MASS INDEX: 31.05 KG/M2

## 2022-11-22 DIAGNOSIS — C20 RECTAL CANCER METASTASIZED TO LIVER (HCC): Primary | ICD-10-CM

## 2022-11-22 DIAGNOSIS — C78.7 RECTAL CANCER METASTASIZED TO LIVER (HCC): Primary | ICD-10-CM

## 2022-11-22 PROCEDURE — 2580000003 HC RX 258: Performed by: INTERNAL MEDICINE

## 2022-11-22 PROCEDURE — 99214 OFFICE O/P EST MOD 30 MIN: CPT

## 2022-11-22 PROCEDURE — 6360000002 HC RX W HCPCS: Performed by: INTERNAL MEDICINE

## 2022-11-22 PROCEDURE — 99213 OFFICE O/P EST LOW 20 MIN: CPT | Performed by: INTERNAL MEDICINE

## 2022-11-22 PROCEDURE — 1123F ACP DISCUSS/DSCN MKR DOCD: CPT | Performed by: INTERNAL MEDICINE

## 2022-11-22 RX ORDER — HEPARIN SODIUM (PORCINE) LOCK FLUSH IV SOLN 100 UNIT/ML 100 UNIT/ML
500 SOLUTION INTRAVENOUS PRN
Status: DISCONTINUED | OUTPATIENT
Start: 2022-11-22 | End: 2022-11-23 | Stop reason: HOSPADM

## 2022-11-22 RX ORDER — WATER 1000 ML/1000ML
2.2 INJECTION, SOLUTION INTRAVENOUS ONCE
OUTPATIENT
Start: 2022-11-22 | End: 2022-11-22

## 2022-11-22 RX ORDER — HEPARIN SODIUM (PORCINE) LOCK FLUSH IV SOLN 100 UNIT/ML 100 UNIT/ML
500 SOLUTION INTRAVENOUS PRN
OUTPATIENT
Start: 2022-11-22

## 2022-11-22 RX ORDER — SODIUM CHLORIDE 0.9 % (FLUSH) 0.9 %
10 SYRINGE (ML) INJECTION PRN
OUTPATIENT
Start: 2022-11-22

## 2022-11-22 RX ORDER — SODIUM CHLORIDE 0.9 % (FLUSH) 0.9 %
10 SYRINGE (ML) INJECTION PRN
Status: DISCONTINUED | OUTPATIENT
Start: 2022-11-22 | End: 2022-11-23 | Stop reason: HOSPADM

## 2022-11-22 RX ADMIN — Medication 500 UNITS: at 09:30

## 2022-11-22 RX ADMIN — SODIUM CHLORIDE, PRESERVATIVE FREE 10 ML: 5 INJECTION INTRAVENOUS at 09:29

## 2022-11-22 NOTE — PROGRESS NOTES
[place] [address]            Attending Clinic Note     Reason for Visit: Follow-up on a patient with Metastatic Rectal Cancer. PCP: Tiana Estrada MD     History of Present Illness:  68 y/o  male who was referred to see Dr. Alonzo Mcgraw (GI team) for evaluation of bright red blood per rectum, mild anemia and change in bowel habits with diarrhea. CEA 2640 on 10/19/2015. AlcP 299 AST 57 ALT 75 on 10/19/2015. Colonoscopy in 2013 noted no significant polyps, colitis or lesions at that time. Denies any Family History of colorectal cancer or polyps. Colonoscopy on 10/19/2015 revealed:  1. Ascending polyp, 8 mm, hot snare: Tubulovillous adenoma. 2. Transverse polyp, 1 cm, hot snare: Serrated polyp most consistent with sessile serrated adenoma. 3. Five splenic flexure polyps, three - 5 mm, 7 mm, 8 mm, hot snare and biopsy: Four segments of Tubular Adenoma  4. Descending polyp, 5 mm, biopsy: Tubular adenoma. 5. Sigmoid polyp, 4 mm biopsy, Serrated polyp most consistent with sessile serrated adenoma. 6. Two rectal polyps, 4 mm biopsy: Serrated polyp most consistent with hyperplastic polyp. 7. Rectosigmoid colon mass (large mass approximately 65% circumference of the lumen; Very friable, firm and hard): Tubulovillous adenoma with associated focal erosion and fibroplasia. CT scan abdomen/pelvis on 10/26/2015:  1. Small nodules at lung bases likely represent metastatic colon   cancer. 2. Extensive likely metastatic colon cancer throughout the liver. 3. Mild mural thickening and luminal narrowing in the   terminal ileum, otherwise nonspecific. Colonoscopy with snare removal rectal mass was performed by Dr. Kiara Ramirez. Pathology proved:  Rectal polyp: Invasive adenocarcinoma involving villous adenoma and extending to the cauterized edge of excision. KRAS Mutation: Mutation detected.   BRAF Mutation: Mutation not detected. 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 CEA 3488. Bone scan on 11/10/2015 noted no metastatic disease. CT chest on 11/10/2015 revealed Multiple pulmonary nodules in the upper and lower lobes consistent with metastatic disease;   Hepatic metastasis also visualized. 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 Mediport was placed. Cycle # 1 of FOLFOX + Avastin was on 11/23/2015. CEA was 4555 on 11/23/2015. Cycle # 2 of FOLFOX + Avastin was on 12/08/2015. CEA was 3160 on 12/08/2015. Cycle # 3 of FOLFOX + Avastin was on 12/22/2015. CEA was 2516 on 12/21/2015. Cycle # 4 of FOLFOX + Avastin was on 01/05/2016. CEA was 2098 on 01/05/2015. Cycle # 5 of FOLFOX + Avastin was on 01/19/2016. CEA was 1511 on 01/05/2015.     -Bone scan on 01/26/2016 noted no metastatic disease. CT chest on 01/26/2016 revealed Significant response to treatment with no visible residual nodules. CT scan abdomen/pelvis on 01/26/2016 noted Interval decreased size of multiple masses in the liver compatible with treatment response. Continue another 2 months of FOLFOX + Avastin and repeat scans. Cycle # 6 of FOLFOX + Avastin was on 02/02/2016.  on 02/02/2016. Cycle # 7 of FOLFOX + Avastin was on 02/16/2016.  on 02/16/2016. Cycle # 8 of FOLFOX + Avastin was on 03/01/2016.  on 03/01/2016. Cycle # 9 of FOLFOX + Avastin was on 03/15/2016.  on 03/15/2016. Cycle # 10 of FOLFOX + Avastin was on 03/29/2016. .4 on 03/29/2016. Cycle # 11 of FOLFOX + Avastin was on 04/12/2016. .4 on 04/12/2016. Re-staging scans on 04/19/2016: CT Chest: clear lungs; no evidence of recurring pulmonary nodule; CT Abdomen/Pelvis: Further interval decrease in size of the multiple metastatic hepatic lesions, the largest lesion now measures 3.9 x 3.5 cm and previously measured 4.5 cm in maximum diameter.  No mesenteric lymphadenopathy is identified; Bone Scan: No evidence of osseous metastasis. Continue same regimen and re-stage in 2-3 months. Cycle # 12 FOLFOX + Avastin was on 04/26/2016. .5 on 04/26/2016. Cycle # 13 FOLFOX + Avastin was on 05/10/2016. .3 on 05/10/2016. Cycle # 14 FOLFOX+AVASTIN was on 05/24/2016. .5 on 05/24/2016. Cycle # 15 FOLFOX + Avastin was on 06/07/2016. CEA 90.5 on 06/07/2016. Admitted to [facility] 06/13/2016-06/16/2016 for abdominal pain: EGD noted 1.5 cm clean based duodenal bulb ulceration s/p epinephrine and bicap per Dr. Ulisses An. No active bleeding. A. Stomach, biopsy: Mild chronic gastritis, immunostain negative for Helicobacter  B. Esophagus, biopsy: Gastric glandular mucosa with prominent intestinal metaplasia (Mad[place]'s epithelium), negative for epithelial dysplasia, esophageal squamous mucosa not identified  Cycle # 16 FOLFOX (discontinued avastin (bevacizumab) given association of peptic ulcer disease and known association of GI perforation) was on 06/21/2016. Cycle # 17 FOLFOX was on 07/05/2016. CEA 52.6 on 07/19/2016. Cycle # 17 FOLFOX was on 07/05/2016. CEA 52.6 on 07/05/2016. CEA 47.6 on 07/19/2016. Re-staging scans 07/19/2106: CT Chest negative for metastatic disease. CT Abdomen/Pelvis: Stable hepatic lesions; Question new mural thickening in the cecum. Bone Scan: New Let hip lesion suspicious for bone metastasis. Increased CEA; new mural thickening in the cecum and new left hip lesion suspicious for bone metastasis are consistent with disease progression; He derived maximum benefit from FOLFOX/AVASTIN. D/C FOLFOX/AVASTIN. We recommended FOLFIRI second line therapy. Cycle # 1 FOLFIRI was on 08/02/2016. CEA 40.8 on 08/02/2016. Xgeva q4 weeks started on 08/02/2016. Cycle # 2 FOLFIRI was on 08/16/2016. CEA 31.7 on 08/16/2016. Cycle # 3 FOLFIRI (added Avastin) was on 08/30/2016 given that ulcers healed on EGD 08/15/2016 by Dr. Lico Villegas; Protonix bid since avastin re-started.    Colonoscopy on 08/29/2016 (to look at the cecal area) unremarkable. CEA 26.7 on 08/30/2016. Cycle # 4 FOLFIRI/Avastin was on 09/13/2016. CEA 23.4 on 09/13/2016. Cycle # 5 FOLFIRI/Avastin was on 09/27/2016. CEA 22.3 on 09/27/2016. Cycle # 6 FOLFIRI/Avastin was on 10/11/2016. CEA 18.4 on 10/11/2016. Bone scan 10/21/2016 stable bone metastasis; ? New lesion anterior left sixth rib likely interval healing fracture. CT abdomen/pelvis revealed stable hepatic metastasis. CT chest negative for metastatic disease. Continue FOLFIRI/Avastin and repeat scans in 3 months. Cycle # 7 FOLFIRI/Avastin was on 10/25/2016. CEA 16.1  Cycle # 8 FOLFIRI/Avastin was on 11/08/2016. CEA 15.7  Cycle # 9 FOLFIRI/Avastin was on 11/29/2016. CEA 13.6 on 11/29/2016. Cycle # 10 FOLFIRI/Avastin was on 12/13/2016. CEA 10.6 on 12/13/2016. Cycle # 11 FOLFIRI/Avastin was on 12/27/2016. CEA 11.1 on 12/27/2016. Cycle # 12 FOLFIRI/Avastin was on 01/10/2017. CEA 9.7 on 01/10/2017. CT chest 01/23/2017 No new pulmonary nodule or lymphadenopathy. Patchy groundglass opacities within the left lower lobe, suggestive of infectious or inflammatory etiology. Followup CT chest in 3 months. Slight increased linear sclerosis along the left anterior sixth rib corresponding to an area of increased uptake on the prior bone scan from 10/21/2016, favored to be related to interval healing changes of a nondisplaced fracture. CT abdomen/pelvis on 01/23/2017 Redemonstration of multiple hepatic metastases, which are similar compared to the prior CT from 10/21/2016. Redemonstration of area of increased sclerosis along the right acetabulum suspicious for metastatic disease. Bone scan on 01/23/2017 Stable subtle uptake within the left proximal diaphysis, again suggestive of metastatic disease  Decreased subtle uptake within the medial right acetabulum.    Decreased uptake within the left anterior sixth rib corresponding to linear sclerosis on the recent CT, favored to be related to an joint rib fracture. Continue FOLFIRI + Avastin and repeat scans in 3 months. Cycle # 13 FOLFIRI + Avastin was on 01/24/2017. Cycle # 14 FOLFIRI + Avastin was on 02/07/2017. Cycle # 15 FOLFIRI + Avastin was on 02/21/2017. Cycle # 16 FOLFIRI + Avastin was on 03/07/2017. Cycle # 17 FOLFIRI + Avastin was on 03/21/2017. Cycle # 18 FOLFIRI + Avastin was on 04/04/2017. CT chest 04/17/2017 negative for metastatic disease. CT abdomen/pelvis 04/17/2017 Stable hypodense metastatic lesions within the liver. Stable area of increased sclerosis along the right acetabulum  Bone scan 04/17/2017 Stable subtle uptake within the left proximal femoral diaphysis; No definite abnormal uptake in the region of a sclerotic lesion along the posterior column of the right acetabulum noted on CT. Stable slight uptake within the left anterior sixth rib corresponding to linear sclerosis on the recent CT, favored to be related to a fracture. Continue FOLFIRI + Avastin and repeat scans in 3 months. Cycle # 19 FOLFIRI + Avastin was on 04/18/2017. Cycle # 20 FOLFIRI + Avastin was on 05/02/2017. Cycle # 21 FOLFIRI + Avastin was on 05/16/2017. Cycle # 22 FOLFIRI + Avastin was on 05/30/2017. Cycle # 23 FOLFIRI + Avastin was on 06/13/2017. Cycle # 24 FOLFIRI + Avastin was on 06/27/2017. Cycle # 25 FOLFIRI + Avastin was on 07/11/2017. Cycle # 26 FOLFIRI + Avastin was on 07/25/2017. Cycle # 27 FOLFIRI + Avastin was on 08/08/2017. Cycle # 28 FOLFIRI + Avastin was on 08/22/2017. Cycle # 29 FOLFIRI + Avastin was on 09/05/2017. Cycle # 30 FOLFIRI + Avastin was on 09/19/2017. Bone scan 09/26/2017 stable. CT abdomen/pelvis on 09/26/2017 Stable hypodense mass in the liver. Stable sclerotic lesion in the acetabulum. CT chest 09/26/2017 negative for metastatic disease. Cycle # 31 FOLFIRI + Avastin was on 10/03/2017. CEA 7.4 on 10/03/2017. Cycle # 32 FOLFIRI + Avastin was on 10/17/2017.  CEA 6.0 on 10/17/2017. Cycle # 33 FOLFIRI + Avastin was on 10/31/2017. CEA 6.8 on 10/31/2017. Cycle # 34 FOLFIRI + Avastin was on 11/14/2017. CEA 7.2 on 11/14/2017. Cycle # 35 FOLFIRI + Avastin was on 11/28/2017. CEA 5.1 on 11/28/2017. Cycle # 36 FOLFIRI + Avastin was on 12/12/2017. CEA 6.1 on 12/12/2017. Bone scan 12/22/2017 noted no evidence of metastatic disease to the axial or appendicular skeleton. CT chest 12/22/2017 noted no evidence of metastatic disease. CT abdomen/pelvis 12/22/2017 noted stable hypodense lesions in the liver. Continue FOLFIRI + Avastin and repeat scans in 3 months. Cycle # 37 FOLFIRI + Avastin was on 12/27/2018. Cycle # 38 FOLFIRI + Avastin was on 01/09/2018. Cycle # 39 FOLFIRI + Avastin was on 01/23/2018. Cycle # 40 FOLFIRI + Avastin was on 02/06/2018. Cycle # 41 FOLFIRI + Avastin was on 02/20/2018. Cycle # 42 FOLFIRI + Avastin was on 03/06/2018. Cycle # 43 FOLFIRI + Avastin was on 03/20/2018. Bone scan on 03/26/2018 negative for metastatic disease. CT chest 03/26/2018 noted ? new 7 mm nodule in LUCY. CT abdomen/pelvis 03/26/2018 noted stable hypodense lesions in the liver. ? New small ascites in the abdomen. Patient wants to continue to monitor the lung finding and repeat scans in 2 months instead of changing the current regimen into oral Lonsurf. Cycle # 44 FOLFIRI + Avastin was on 04/03/2018. CEA 6.3 on 04/03/2018. Cycle # 45 FOLFIRI + Avastin was on 04/24/2018. CEA 5.3 on 04/24/2018. Cycle # 46 FOLFIRI + Avastin was on 05/08/2018. CEA 5.4 on 05/08/2018. Cycle # 47 FOLFIRI + Avastin was on 05/22/2018. CEA 6.1 on 05/22/2018. CT chest 05/31/2018 noted stable nodule in LUCY. No evidence of worsening malignancy. CT abdomen/pelvis on 05/31/2018 noted stable liver metastasis. No evidence of worsening malignancy. Continue FOLFIRI + Avastin and repeat scans in 3 months. Cycle # 48 FOLFIRI + Avastin was on 06/06/2018. Cycle # 49 FOLFIRI + Avastin was on 06/19/2018. Cycle # 50 FOLFIRI + Avastin was on 07/03/2018. Cycle # 51 FOLFIRI + Avastin was on 07/24/2018. Cycle # 52 FOLFIRI + Avastin was on 08/14/2018. Cycle # 53 FOLFIRI + Avastin was on 08/28/2018. CT chest 09/06/2018 noted interval decrease in size of LUCY measuring up to 4 mm, suggestive of treatment response. No mediastinal or hilar LN  CT abdomen/pelvis 09/06/2018 Slight interval increase in size of a previously noted metastatic lesion within the right hepatic lobe. This may be related to differences in phase of enhancement compared to the most recent study from 5/31/2018, the lesion remains decreased in size compared to the more previous studies. No abdominal, retroperitoneal, or pelvic lymphadenopathy. Continue FOLFIRI + Avastin and repeat scans in 2 months. Cycle # 54 FOLFIRI + Avastin was on 09/11/2018. Cycle # 55 FOLFIRI + Avastin was on 09/25/2018. Cycle # 56 FOLFIRI + Avastin was on 10/09/2018. Cycle # 57 FOLFIRI + Avastin was on 10/23/2018. CT chest 11/02/2018 stable 3 mm nodule within LUCY. No new right and left lung nodule. No mediastinal or osseous lesion. CT abdomen/pelvis 11/02/2018 stable metastatic disease to liver. No new metastatic disease identified. No mesenteric or retroperitoneal LN. No gross colonic lesion identified. Continue FOLFIRI + Avastin and repeat scans in 3 months. Cycle # 58 FOLFIRI + Avastin was on 11/06/2018. CEA 7.6 on 11/05/2018. Cycle # 59 FOLFIRI + Avastin was on 11/27/2018. CEA 6.9 on 11/26/2018. Cycle # 60 FOLFIRI + Avastin was on 12/11/2018. CEA 6.7 on 12/11/2018. Cycle # 61 FOLFIRI + Avastin was on 01/08/2019. CEA 5.6 on 01/07/2019. Cycle # 62 FOLFIRI + Avastin was on 01/29/2019. CEA 4.6 on 01/28/2019. Cycle # 63 FOLFIRI + Avastin was on 02/12/2019. CEA 4.3 on 02/11/2019. CT chest 02/21/2019 no evidence of metastatic disease. CT abdomen/pelvis 02/21/2019 stable hypodense liver lesions. No evidence of worsening metastatic disease.  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 Ordered MRI thoracic spine and referred to neurosurgery team.    Cycle # 64 FOLFIRI + Avastin was on 02/26/2019. CEA 4.7 on 02/25/2019. Cycle # 65 FOLFIRI + Avastin was on 03/12/2019. CEA 4.0 on 03/11/2019. Cycle # 66 FOLFIRI + Avastin was on 03/26/2019. CEA 4.7 on 03/25/2019. Cycle # 67 FOLFIRI + Avastin was on 04/09/2019. CEA 5.6 on 04/08/2019. Cycle # 68 FOLFIRI + Avastin was on 04/30/2019. CEA 4.9 on 04/29/2019. Cycle # 69 FOLFIRI + Avastin was on 05/14/2019. CEA 5.3 on 05/14/2019. CT chest 05/23/2019 stable small lung nodule with no evidence of metastatic disease. CT abdomen/pelvis 05/23/2019 Decrease conspicuity of the liver lesions. No new lesions seen. Stable splenomegaly. Continue FOLFIRI + Avastin and repeat scans in 3 months. Cycle # 70 FOLFIRI + Avastin was on 06/04/2019. CEA 4.8 on 06/03/2019. Cycle # 71 FOLFIRI + Avastin was on 06/18/2019. CEA 4.6 on 06/17/2019. Cycle # 72 FOLFIRI + Avastin was on 07/09/2019. CEA 4.3 on 07/08/2019. Cycle # 73 FOLFIRI + Avastin was on 07/30/2019. CEA 5.0 on 07/29/2019. Cycle # 74 FOLFIRI + Avastin was on 08/13/2019. CEA 5.0 on 08/12/2019. CT chest 08/20/2019 negative  CT abdomen/pelvis 08/20/2019 Previous identified hepatic lesions are not visualized on the current exam.  Continue FOLFIRI + Avastin and repeat scans in 3 months. Cycle # 75 FOLFIRI + Avastin was on 08/27/2019. CEA 5.0 on 08/26/2019. Cycle # 76 FOLFIRI + Avastin was on 09/17/2019. CEA 5.5 on 09/16/2019. Cycle # 77 FOLFIRI + Avastin was on 10/15/2019. CEA 4.6 on 10/15/2019. Cycle # 78 FOLFIRI + Avastin was on 10/29/2019. CEA 4.1 on 10/28/2019. Cycle # 79 FOLFIRI + Avastin was on 11/12/2019. CEA 4.3 on 11/12/2019. Cycle # 80 FOLFIRI + Avastin was on 12/10/2019. CEA 4.0 on 12/09/2019.   CT chest 12/19/2019 Stable 3 mm nodule within the left upper lobe, similar to the previous studies dating back to 11/2/2018, however decreased in size compared to the CT from 3/26/2018. No thoracic LN. CT abdomen/pelvis 12/19/2019 Previously described small hypodense lesions are more conspicuous on the current study compared to the recent study throughout the liver from 8/20/2019, however similar to the prior study from 2/21/2019. Findings may be related to differences in contrast opacification. No abdominal or pelvic lymphadenopathy. Continue FOLFIRI + Avastin and repeat scans in 2 months to f/u on liver lesions. Cycle # 81 FOLFIRI + Avastin was on 01/07/2020. CEA 3.8 on 01/06/2020. Cycle # 82 FOLFIRI + Avastin was on 01/21/2020. CEA 3.7 on 01/20/2020. Cycle # 83 FOLFIRI + Avastin was on 02/04/2020. CEA 4.1 on 02/03/2020. Cycle # 84 FOLFIRI + Avastin was on 02/18/2020. CEA 4.6 on 02/17/2020. EGD by Dr. Glen Pardo 03/02/2020 showing no masses or lesions  Cycle # 85 FOLFIRI + Avastin was on 03/03/2020. CEA 7.4 on 03/02/2020. Cycle # 86 FOLFIRI + Avastin was on 03/17/2020. CEA 4.5 on 03/16/2020. Colonoscopy on 03/23/2020 by Dr. Glen aguero (records requested). Cycle # 87 FOLFIRI + Avastin was on 03/31/2020. CEA 4.1 on 03/30/2020. Cycle # 88 FOLFIRI + Avastin was on 04/21/2020. CEA 6.4 on 04/20/2020. Cycle # 89 FOLFIRI + Avastin was on 05/05/2020. CEA 5.8 on 05/04/2020. Cycle # 90 FOLFIRI + Avastin was on 06/02/2020. CEA 5.5 on 06/01/2020. Cycle # 91 FOLFIRI + Avastin was on 06/16/2020. CEA 5.6 on 06/15/2020. CT chest 06/23/2020 noted no metastatic disease in the chest.   CT abdomen/pelvis 06/23/2020 Stable small liver lesions measuring up to 5 mm since 2019.   22 mm round mass in segment 8 of the liver with central high density stable from December 2019), previously measuring up to 34 mm in 2017. No additional metastatic disease in the abdomen or pelvis. Small amount of ascites. Overall stable disease. Continue FOLFIRI + Avastin and repeat scans in 2-3 months. Cycle # 92 FOLFIRI + Avastin was on 07/07/2020.  CEA 5.7 on 07/06/2020. Cycle # 93 FOLFIRI + Avastin was on 07/21/2020. CEA 5.9 on 07/20/2020. Cycle # 94 FOLFIRI + Avastin was on 08/04/2020. CEA 5.5 on 08/04/2020. Cycle # 95 FOLFIRI + Avastin was on 08/25/2020. CEA 6.1 on 08/24/2020. CT chest 9/2/2020 stable unremarkable enhanced CT of the thorax. There is no metastatic disease to the lungs. CT abdomen pelvis on 9/2/2020 stable 2.2 cm low attenuated lesion within the central aspect of the right hepatic lobe favored to represent treated metastatic disease. No new hepatic lesion is identified. Stable splenomegaly  There is no appreciable colonic lesion or stricture  Pericholecystic fluid of uncertain etiology. General surgery team consulted. Laparoscopic cholecystectomy with normal cholangiogram 10/27/20  Gallbladder: Chronic cholecystitis and cholelithiasis. Unremarkable regional lymph node. 11/13/2020; Seen by Dr. Juarez Chaparro from General surgery team; cleared to re-start chemotherapy. Cycle # 96 FOLFIRI + Avastin was on 11/17/2020. CEA 3.6 on 11/16/2020. Cycle # 97 FOLFIRI + Avastin was on 12/01/2020. CEA 3.5 on 11/30/2020. Cycle # 98 FOLFIRI + Avastin was on 12/15/2020. CEA 4.3 on 12/15/2020. Cycle # 99 FOLFIRI + Avastin was on 01/05/2021. CEA 4.7 on 01/04/2021. CT chest 01/13/2021 negative for metastatic disease. CT abdomen/pelvis 01/13/2021 Unchanged central hepatic lesion. No specific signs of abdominopelvic metastatic disease. Continue FOLFIRI + Avastin and repeat scans in 3 months. Cycle # 100 FOLFIRI + Avastin was on 01/19/2021. CEA 4.7 on 01/18/2021. Cycle # 101 FOLFIRI + Avastin was on 02/02/2021. CEA 5.2 on 02/01/2021. Cycle # 102 FOLFIRI + Avastin was on 02/16/2021. CEA 5.6 on 02/15/2021. Cycle # 103 FOLFIRI + Avastin was on 03/02/2021. Cycle # 104 FOLFIRI (20% dose reduced due to significant toxicity) + Avastin was on 03/16/2021. Cycle # 105 FOLFIRI (same dose as cycle # 104) + Avastin was on 03/30/2021.  CEA 6.5 on 03/29/2021. Cycle # 106 FOLFIRI (same dose as cycle # 104) + Avastin was on 04/13/2021. CEA 6.0 on 04/12/2021  CT chest 04/20/2021 no evidence of metastatic disease. CT abdomen/pelvis 04/20/2021 No evidence of progressive metastatic disease. Stable 2 cm lesion in the right lobe of the liver, likely representing treated metastatic disease. Imaging reviewed. Continue FOLFIRI + Avastin and repeat scans in 3 months  Cycle # 107 FOLFIRI + Avastin was on 04/27/2021  Cycle # 108 FOLFIRI + Avastin was on 05/11/2021. Cycle # 109 FOLFIRI (held Avastin due to upcoming surgery) on 05/25/2021. CEA 6.3 on 5/24/21  Cycle # 110 FOLFIRI (held Avastin due to upcoming surgery) on 06/08/2021. CEA 6.3 on 6/07/21. Right inguinal/scrotol hernia repair on 07/13/2021 with Dr Elías Clark with folely removal on Friday 07/23/2021. He developed fever on 07/24/2021 and was brought to [address] [place], [place] [address] ER via EMS; Sepsis secondary to urinary tract infection   Completed Abx  CEA 4.1 on 08/30/2021  Cycle # 111 FOLFIRI was on 08/31/2021. HBP team for evaluation of liver lesions; His lesion does appear to be a hemangioma as his prior metastatic deposits in his liver were hypo attenuating compared to this CT which is hyper attenuating. compared to his prior CT scans he has disappearing liver metastasis. MRI liver on 09/17/2021 No suspicious liver lesion identified. Colonoscopy GI Dr. Luis Armando Patel on 09/20/2021 unremarkable  CEA 3.3 on 09/27/2021. CEA 3.2 on 10/25/2021. CEA 2.7 on 11/22/2021. CEA 2.5 on 12/27/2021. CEA 2.4 on 01/24/2022. CEA 2.3 on 02/21/2022. CEA 2.4 on 03/21/2022. CEA 2.2 on 04/18/2022  CEA 2.2 on 05/16/2022  CEA 2.3 on 06/06/2022  HBP note 06/14/2022 reviewed. No evidence of recurrence on repeat imaging.   CEA 2.4 on 07/02/2022  CEA 2.3 on 08/01/2022  CEA 2.1 on 08/29/2022    CT chest 09/21/2022: No evidence of metastatic disease  CT abdomen/pelvis 09/21/2022: Previously measured areas in the liver are essentially unchanged and indeterminate with no significant hypermetabolism on recent PET-CT. No new hepatic lesion. No evidence for recurrent or metastatic disease within the abdomen or pelvis. CEA 2.1 on 09/26/2022. CEA 1.7 on 10/24/2022. CEA 2.3 on 11/21/2022. Today 11/22/2022; No fever, chills. Fair appetite and energy level. No chest pain or shortness of breath. No nausea vomiting. Intermittent arthralgias. Review of Systems;  CONSTITUTIONAL: No fever, chills. Fair appetite and energy level  ENMT: Eyes: No diplopia; Nose: No epistaxis. Mouth:No lesions  RESPIRATORY: No hemoptysis, shortness of breath. CARDIOVASCULAR: No chest pain, palpitations. GASTROINTESTINAL:No N/V, abdominal pain. GENITOURINARY: No dysuria, urinary frequency, hematuria. MSK: Intermittent Arthritis   NEURO: No syncope, presyncope, headache. Remainder ROS: Negative. Past Medical History:   Past Medical History               Diagnosis Date    Arthritis      Cancer ([address]         colon    Depression      Hyperlipidemia      Hypertension           Medications:  Reviewed and reconciled. Allergies: Allergies   Allergen Reactions    Neosporin [Neomycin-Polymyxin-Gramicidin] Rash    Tape Meryle Grit Tape] Rash      Physical Exam:  BP (!) 144/79   Pulse 59   Temp 97.3 °F (36.3 °C)   Ht 6' 1\" (1.854 m)   Wt 234 lb 4.8 oz (106.3 kg)   SpO2 98%   BMI 30.91 kg/m²   GENERAL: Alert, oriented x 3, not in distress  HEENT: PERRLA, EOMI. No oral lesions   NECK: Supple. Without lymphadenopathy. LUNGS: CTA bilaterally, with no wheezing, crackles or rhonchi. CARDIOVASCULAR: Regular rhythm. No murmurs, rubs or gallops. ABDOMEN: Soft. Non-tender, non-distended. EXTREMITIES: Without clubbing, cyanosis  NEUROLOGIC: No focal deficits.    ECOG PS 1    Diagnostics:  Lab Results   Component Value Date    WBC 6.4 11/21/2022    HGB 11.7 (L) 11/21/2022    HCT 34.6 (L) 11/21/2022    MCV 88.9 11/21/2022     (L) 11/21/2022     Lab Results Component Value Date     11/21/2022    K 4.1 11/21/2022     11/21/2022    CO2 24 11/21/2022    BUN 22 11/21/2022    CREATININE 1.1 11/21/2022    GLUCOSE 138 (H) 11/21/2022    CALCIUM 9.7 11/21/2022    PROT 7.2 11/21/2022    LABALBU 4.0 11/21/2022    BILITOT 0.7 11/21/2022    ALKPHOS 136 (H) 11/21/2022    AST 20 11/21/2022    ALT 16 11/21/2022    LABGLOM >60 11/21/2022    GFRAA >60 09/26/2022     Lab Results   Component Value Date    CEA 2.3 11/21/2022     Impression/Plan:  66 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 KRAS Mutation: Mutation detected. BRAF Mutation: Mutation not detected. 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 CT scan abdomen/pelvis on 10/26/2015 revealed small nodules at the lung bases, extensive liver lesions consistent with metastatic rectosigmoid cancer. CEA 3488 on 10/30/2015. Bone scan on 11/10/2015 noted no metastatic disease. CT chest on 11/10/2015 revealed Multiple pulmonary nodules in the upper and lower lobes consistent with metastatic disease; Hepatic metastasis also visualized. 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 Mediport was placed. Cycle # 1 of FOLFOX + Avastin was on 11/23/2015. CEA was 4555 on 11/23/2015. Cycle # 2 of FOLFOX + Avastin was on 12/08/2015. CEA was 3160 on 12/08/2015. Cycle # 3 of FOLFOX + Avastin was on 12/22/2015. CEA was 2516 on 12/21/2015. Cycle # 4 of FOLFOX + Avastin was on 01/05/2016. CEA was 2098 on 01/05/2015. Cycle # 5 of FOLFOX + Avastin was on 01/19/2016. CEA was 1511 on 01/05/2015.     -Bone scan on 01/26/2016 noted no metastatic disease. CT chest on 01/26/2016 revealed Significant response to treatment with no visible residual nodules. CT scan abdomen/pelvis on 01/26/2016 noted Interval decreased size of multiple masses in the liver compatible with treatment response.   Continue another 2 months of FOLFOX + Avastin and repeat scans.  Cycle # 6 of FOLFOX + Avastin was on 02/02/2016.  on 02/02/2016. Cycle # 7 of FOLFOX + Avastin was on 02/16/2016.  on 02/16/2016. Cycle # 8 of FOLFOX + Avastin was on 03/01/2016.  on 03/01/2016. Cycle # 9 of FOLFOX + Avastin was on 03/15/2016.  on 03/15/2016. Cycle # 10 of FOLFOX + Avastin was on 03/29/2016. .4 on 03/29/2016. Cycle # 11 of FOLFOX + Avastin was on 04/12/2016. .4 on 04/12/2016. Re-staging scans on 04/19/2016: CT Chest: clear lungs; no evidence of recurring pulmonary nodule; CT Abdomen/Pelvis: Further interval decrease in size of the multiple metastatic hepatic lesions, the largest lesion now measures 3.9 x 3.5 cm and previously measured 4.5 cm in maximum diameter. No mesenteric lymphadenopathy is identified; Bone Scan: No evidence of osseous metastasis. Continue same regimen and re-stage in 2-3 months. Cycle # 12 FOLFOX + Avastin was on 04/26/2016. .5 on 04/26/2016. Cycle # 13 FOLFOX + Avastin was on 05/10/2016. .3 on 05/10/2016. Cycle # 14 FOLFOX+AVASTIN was on 05/24/2016. .5 on 05/24/2016. Cycle # 15 FOLFOX + Avastin was on 06/07/2016. CEA 90.5 on 06/07/2016. Admitted to [facility] 06/13/2016-06/16/2016 for abdominal pain: EGD noted 1.5 cm clean based duodenal bulb ulceration s/p epinephrine and bicap per Dr. Amaya [place]. No active bleeding. A. Stomach, biopsy: Mild chronic gastritis, immunostain negative for Helicobacter  B. Esophagus, biopsy: Gastric glandular mucosa with prominent ntestinal metaplasia ([place]'s epithelium), negative for epithelial dysplasia, esophageal squamous mucosa not identified     Cycle # 16 FOLFOX (discontinued avastin (bevacizumab) given association of peptic ulcer disease and known association of GI perforation.) was on 06/21/2016. CEA 47 on 06/21/2016. Cycle # 17 FOLFOX was on 07/05/2016. CEA 52.6 on 07/05/2016. CEA 47.6 on 07/19/2016.      Re-staging scans 07/19/2106: CT Chest negative for metastatic disease. CT Abdomen/Pelvis: Stable hepatic lesions; Question new mural thickening in the cecum. Bone Scan: New Let hip lesion suspicious for bone metastasis. Increased CEA; new mural thickening in the cecum and new left hip lesion suspicious for bone metastasis are consistent with disease progression; He derived maximum benefit from FOLFOX/AVASTIN. D/C FOLFOX/AVASTIN. We recommended FOLFIRI second line therapy. Cycle # 1 FOLFIRI was on 08/02/2016. CEA 40.8 on 08/02/2016. Xgeva q4 weeks started on 08/02/2016. Cycle # 2 FOLFIRI was on 08/16/2016. CEA 31.7 on 08/16/2016. Cycle # 3 FOLFIRI (added Avastin) was on 08/30/2016 given that ulcers healed on EGD 08/15/2016 by Dr. Elnora Mcardle; Protonix bid since avastin re-started. Colonoscopy on 08/29/2016 (to look at the cecal area) unremarkable. CEA 26.7 on 08/30/2016. Cycle # 4 FOLFIRI/Avastin was on 09/13/2016. CEA 23.4 on 09/13/2016. Cycle # 5 FOLFIRI/Avastin was on 09/27/2016. CEA 22.3 on 09/27/2016. Cycle # 6 FOLFIRI/Avastin was on 10/11/2016. CEA 18.4 on 10/11/2016. Bone scan 10/21/2016 stable bone metastasis; ? New lesion anterior left sixth rib likely interval healing fracture. CT abdomen/pelvis revealed stable hepatic metastasis. CT chest negative for metastatic disease. Continue FOLFIRI/Avastin and repeat scans in 3 months. Cycle # 7 FOLFIRI/Avastin was on 10/25/2016. CEA 16.1 on 10/25/2016. Cycle # 8 FOLFIRI/Avastin was on 11/08/2016. CEA 15.7 on 11/08/2016. Cycle # 9 FOLFIRI/Avastin was on 11/29/2016. CEA 13.6 on 11/29/2016. Cycle # 10 FOLFIRI/Avastin was on 12/13/2016. CEA 10.6 on 12/13/2016. Cycle # 11 FOLFIRI/Avastin was on 12/27/2016. CEA 11.1 on 12/27/2016. Cycle # 12 FOLFIRI/Avastin was on 01/10/2017. CEA 9.7 on 01/10/2017. CT chest 01/23/2017 No new pulmonary nodule or lymphadenopathy. Patchy groundglass opacities within the left lower lobe, suggestive of infectious or inflammatory etiology.  Followup CT chest in 3 months. Slight increased linear sclerosis along the left anterior sixth rib corresponding to an area of increased uptake on the prior bone scan from 10/21/2016, favored to be related to interval healing changes of a nondisplaced fracture. CT abdomen/pelvis on 01/23/2017 Redemonstration of multiple hepatic metastases, which are similar compared to the prior CT from 10/21/2016. Redemonstration of area of increased sclerosis along the right acetabulum suspicious for metastatic disease. Bone scan on 01/23/2017 Stable subtle uptake within the left proximal diaphysis, again suggestive of metastatic disease  Decreased subtle uptake within the medial right acetabulum. Decreased uptake within the left anterior sixth rib corresponding to linear sclerosis on the recent CT, favored to be related to an joint rib fracture. Continue FOLFIRI + Avastin and repeat scans in 3 months. Cycle # 13 FOLFIRI + Avastin was on 01/24/2017. Cycle # 14 FOLFIRI + Avastin was on 02/07/2017. Cycle # 15 FOLFIRI + Avastin was on 02/21/2017. Cycle # 16 FOLFIRI + Avastin was on 03/07/2017. Cycle # 17 FOLFIRI + Avastin was on 03/21/2017. Cycle # 18 FOLFIRI + Avastin was on 04/04/2017. CT chest 04/17/2017 negative for metastatic disease. CT abdomen/pelvis 04/17/2017 Stable hypodense metastatic lesions within the liver. Stable area of increased sclerosis along the right acetabulum  Bone scan 04/17/2017 Stable subtle uptake within the left proximal femoral diaphysis; No definite abnormal uptake in the region of a sclerotic lesion along the posterior column of the right acetabulum noted on CT. Stable slight uptake within the left anterior sixth rib corresponding to linear sclerosis on the recent CT, favored to be related to a fracture. Continue FOLFIRI + Avastin and repeat scans in 3 months. Cycle # 19 FOLFIRI + Avastin was on 04/18/2017. CEA 7.5 on 04/18/2017. Cycle # 20 FOLFIRI + Avastin was on 05/02/2017.   CEA 7.7 on 05/02/2017. Cycle # 21 FOLFIRI + Avastin was on 05/16/2017. CEA 7.1 on 05/16/2017. Cycle # 22 FOLFIRI + Avastin was on 05/30/2017. CEA 8.2 on 05/30/2017. Cycle # 23 FOLFIRI + Avastin was on 06/13/2017. CEA 7.7 on 06/13/2017. Cycle # 24 FOLFIRI + Avastin was on 06/27/2017. CEA 6.6 on 06/27/2017. Re-staging scans 07/05/2017:  Bone scan stable proximal left femoral diaphysis, right acetabulum, and left anterior sixth rib lesions;  CT abdomen/pelvis stable hypodense metastatic lesions within the liver; CT chest without convincing evidence of metastatic disease. Cycle # 25 FOLFIRI + Avastin was on 07/11/2017. CEA 6.3 on 07/11/2017. Cycle # 26 FOLFIRI + Avastin was on 07/25/2017. CEA 7.3 on 07/25/2017. Cycle # 27 FOLFIRI + Avastin was on 08/08/2017. CEA 6.5 on 08/08/2017. Cycle # 28 FOLFIRI + Avastin was on 08/22/2017. CEA 5.9 on 08/22/2017. Cycle # 29 FOLFIRI + Avastin was on 09/05/2017. CEA 6.3 on 09/05/2017. Cycle # 30 FOLFIRI + Avastin was on 09/19/2017. CEA 6.3 on 09/19/2017. Bone scan 09/26/2017 stable. CT abdomen/pelvis on 09/26/2017 Stable hypodense mass in the liver. Stable sclerotic lesion in the acetabulum. CT chest 09/26/2017 negative for metastatic disease. Cycle # 31 FOLFIRI + Avastin was on 10/03/2017. CEA 7.4 on 10/03/2017. Cycle # 32 FOLFIRI + Avastin was on 10/17/2017. CEA 6.0 on 10/17/2017. Cycle # 33 FOLFIRI + Avastin was on 10/31/2017. CEA 6.8 on 10/31/2017. Cycle # 34 FOLFIRI + Avastin was on 11/14/2017. CEA 7.2 on 11/14/2017. Cycle # 35 FOLFIRI + Avastin was on 11/28/2017. CEA 5.1 on 11/28/2017. Cycle # 36 FOLFIRI + Avastin was on 12/12/2017. CEA 6.1 on 12/12/2017. Bone scan 12/22/2017 noted no evidence of metastatic disease to the axial or appendicular skeleton. CT chest 12/22/2017 noted no evidence of metastatic disease. CT abdomen/pelvis 12/22/2017 noted stable hypodense lesions in the liver. Continue FOLFIRI + Avastin and repeat scans in 3 months.   Cycle # 37 FOLFIRI + Avastin was on 12/27/2018. CEA 6.7 on 12/27/2017. Cycle # 38 FOLFIRI + Avastin was on 01/09/2018. CEA 5.0 on 01/09/2018. Cycle # 39 FOLFIRI + Avastin was on 01/23/2018. CEA 6.5 on 01/23/2018. Cycle # 40 FOLFIRI + Avastin was on 02/06/2018. CEA 6.9 on 02/06/2018. Cycle # 41 FOLFIRI + Avastin was on 02/20/2018. CEA 6.4 on 02/20/2018. Cycle # 42 FOLFIRI + Avastin was on 03/06/2018. CEA 6.6 on 03/06/2018. Cycle # 43 FOLFIRI + Avastin was on 03/20/2018. CEA 5.7 on 03/20/2018. Bone scan on 03/26/2018 negative for metastatic disease. CT chest 03/26/2018 noted ? new 7 mm nodule in LUCY. CT abdomen/pelvis 03/26/2018 noted stable hypodense lesions in the liver. ? New small ascites in the abdomen. Patient wants to continue to monitor the lung finding and repeat scans in 2 months instead of changing the current chemotherapy regimen to oral Lonsurf. Cycle # 44 FOLFIRI + Avastin was on 04/03/2018. CEA 6.3 on 04/03/2018. Cycle # 45 FOLFIRI + Avastin was on 04/24/2018. CEA 5.3 on 04/24/2018. Cycle # 46 FOLFIRI + Avastin was on 05/08/2018. CEA 5.4 on 05/08/2018. Cycle # 47 FOLFIRI + Avastin was on 05/22/2018. CEA 6.1 on 05/22/2018. CT chest 05/31/2018 noted stable nodule in LUCY. No evidence of worsening malignancy. CT abdomen/pelvis on 05/31/2018 noted stable liver metastasis. No evidence of worsening malignancy. Continue FOLFIRI + Avastin and repeat scans in 3 months. Cycle # 48 FOLFIRI + Avastin was on 06/06/2018. CEA 6.3 on 06/05/2018. Cycle # 49 FOLFIRI + Avastin was on 06/19/2018. CEA 6.1 on 06/19/2018. Cycle # 50 FOLFIRI + Avastin was on 07/03/2018. CEA 7.4 on 07/03/2018. Cycle # 51 FOLFIRI + Avastin was on 07/24/2018. CEA 6.7 on 07/24/2018. Cycle # 52 FOLFIRI + Avastin was on 08/14/2018. CEA 6.8 on 08/14/2018. Cycle # 53 FOLFIRI + Avastin was on 08/28/2018. CEA 6.5 on 08/27/2018.   CT chest 09/06/2018 noted interval decrease in size of LUCY measuring up to 4 mm, suggestive of treatment response. No mediastinal or hilar LN  CT abdomen/pelvis 09/06/2018 Slight interval increase in size of a previously noted metastatic lesion within the right hepatic lobe. This may be related to differences in phase of enhancement compared to the most recent study from 5/31/2018, the lesion remains decreased in size compared to the more previous studies. No abdominal, retroperitoneal, or pelvic lymphadenopathy. Continue FOLFIRI + Avastin and repeat scans in 2 months. Cycle # 54 FOLFIRI + Avastin was on 09/11/2018. CEA 6.4 on 09/10/2018. Cycle # 55 FOLFIRI + Avastin was on 09/25/2018. CEA 6.4 on 09/25/2018. Cycle # 56 FOLFIRI + Avastin was on 10/09/2018. CEA 6.7 on 10/08/2018. Cycle # 57 FOLFIRI + Avastin was on 10/23/2018. CEA 7.2 on 10/22/2018. CT chest 11/02/2018 stable 3 mm nodule within LUCY. No new right and left lung nodule. No mediastinal or osseous lesion. CT abdomen/pelvis 11/02/2018 stable metastatic disease to liver. No new metastatic disease identified. No mesenteric or retroperitoneal LN. No gross colonic lesion identified. Continue FOLFIRI + Avastin and repeat scans in 3 months. Cycle # 58 FOLFIRI + Avastin was on 11/06/2018. CEA 7.6 on 11/05/2018. Cycle # 59 FOLFIRI + Avastin was on 11/27/2018. CEA 6.9 on 11/26/2018. Cycle # 60 FOLFIRI + Avastin was on 12/11/2018. CEA 6.7 on 12/11/2018. Cycle # 61 FOLFIRI + Avastin was on 01/08/2019. CEA 5.6 on 01/07/2019. Cycle # 62 FOLFIRI + Avastin was on 01/29/2019. CEA 4.6 on 01/28/2019. Cycle # 63 FOLFIRI + Avastin was on 02/12/2019. CEA 4.3 on 02/11/2019. CT chest 02/21/2019 no evidence of metastatic disease. CT abdomen/pelvis 02/21/2019 stable hypodense liver lesions. No evidence of worsening metastatic disease. 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 Ordered MRI thoracic spine and referred to neurosurgery team.  Cycle # 64 FOLFIRI + Avastin was on 02/26/2019. CEA 4.7 on 02/25/2019.   Cycle # 65 FOLFIRI + Avastin was on 03/12/2019. CEA 4.0 on 03/11/2019. Cycle # 66 FOLFIRI + Avastin was on 03/26/2019. CEA 4.7 on 03/25/2019. Cycle # 67 FOLFIRI + Avastin was on 04/09/2019. CEA 5.6 on 04/08/2019. Cycle # 68 FOLFIRI + Avastin was on 04/30/2019. CEA 4.9 on 04/29/2019. Cycle # 69 FOLFIRI + Avastin was on 05/14/2019. CEA 5.3 on 05/14/2019. CT chest 05/23/2019 stable small lung nodule with no evidence of metastatic disease. CT abdomen/pelvis 05/23/2019 Decrease conspicuity of the liver lesions. No new lesions seen. Stable splenomegaly. Continue FOLFIRI + Avastin and repeat scans in 3 months. Cycle # 70 FOLFIRI + Avastin was on 06/04/2019. CEA 4.8 on 06/03/2019. Cycle # 71 FOLFIRI + Avastin was on 06/18/2019. CEA 4.6 on 06/17/2019. Cycle # 72 FOLFIRI + Avastin was on 07/09/2019. CEA 4.3 on 07/08/2019. Cycle # 73 FOLFIRI + Avastin was on 07/30/2019. CEA 5.0 on 07/29/2019. Cycle # 74 FOLFIRI + Avastin was on 08/13/2019. CEA 5.0 on 08/12/2019. CT chest 08/20/2019 negative  CT abdomen/pelvis 08/20/2019 Previous identified hepatic lesions are not visualized on the current exam.  Continue FOLFIRI + Avastin and repeat scans in 3 months. Cycle # 75 FOLFIRI + Avastin was on 08/27/2019. CEA 5.0 on 08/26/2019. Cycle # 76 FOLFIRI + Avastin was on 09/17/2019. CEA 5.5 on 09/16/2019. Cycle # 77 FOLFIRI + Avastin was on 10/15/2019. CEA 4.6 on 10/15/2019. Cycle # 78 FOLFIRI + Avastin was on 10/29/2019. CEA 4.1 on 10/28/2019. Cycle # 79 FOLFIRI + Avastin was on 11/12/2019. CEA 4.3 on 11/12/2019. Cycle # 80 FOLFIRI + Avastin was on 12/10/2019. CEA 4.0 on 12/09/2019. CT chest 12/19/2019 Stable 3 mm nodule within the left upper lobe, similar to the previous studies dating back to 11/2/2018, however decreased in size compared to the CT from 3/26/2018. No thoracic LN.    CT abdomen/pelvis 12/19/2019 Previously described small hypodense lesions are more conspicuous on the current study compared to the recent study throughout the liver from 8/20/2019, however similar to the prior study from 2/21/2019. Findings may be related to differences in contrast opacification. No abdominal or pelvic lymphadenopathy. Continue FOLFIRI + Avastin and repeat scans in 2 months to f/u on liver lesions. Cycle # 81 FOLFIRI + Avastin was on 01/07/2020. CEA 3.8 on 01/06/2020. Cycle # 82 FOLFIRI + Avastin was on 01/21/2020. CEA 3.7 on 01/20/2020. Cycle # 83 FOLFIRI + Avastin was on 02/04/2020. CEA 4.1 on 02/03/2020. Cycle # 84 FOLFIRI + Avastin was on 02/18/2020. CEA 4.6 on 02/17/2020. CT chest 2/28/2020 no evidence of metastatic disease to the lungs and no mediastinal or hilar adenopathy. CT abdomen pelvis 2/28/2020 no enhancing lesions seen within the liver to suggest metastatic disease. Wall thickening of the rectosigmoid junction. Images reviewed. Continue FOLFIRI Avastin and repeat scans in 3 months  EGD by Dr. Carla Chisholm 03/02/2020 showing no masses or lesions. Cycle # 85 FOLFIRI + Avastin was on 03/03/2020. CEA 7.4 on 03/02/2020. Cycle # 86 FOLFIRI + Avastin was on 03/17/2020. CEA 4.5 on 03/16/2020. Colonoscopy on 03/23/2020 by Dr. Carla aguero (records requested). Cycle # 87 FOLFIRI + Avastin was on 03/31/2020. CEA 4.1 on 03/30/2020. Cycle # 88 FOLFIRI + Avastin was on 04/21/2020. CEA 6.4 on 04/20/2020. Cycle # 89 FOLFIRI + Avastin was on 05/05/2020. CEA 5.8 on 05/04/2020. Cycle # 90 FOLFIRI + Avastin was on 06/02/2020. CEA 5.5 on 06/01/2020. Cycle # 91 FOLFIRI + Avastin was on 06/16/2020. CEA 5.6 on 06/15/2020. CT chest 06/23/2020 noted no metastatic disease in the chest.   CT abdomen/pelvis 06/23/2020 Stable small liver lesions measuring up to 5 mm since 2019.   22 mm round mass in segment 8 of the liver with central high density stable from December 2019), previously measuring up to 34 mm in 2017. No additional metastatic disease in the abdomen or pelvis. Small amount of ascites.   Overall stable disease. Continue FOLFIRI + Avastin and repeat scans in 2-3 months. Cycle # 92 FOLFIRI + Avastin was on 07/07/2020. CEA 5.7 on 07/06/2020. Cycle # 93 FOLFIRI + Avastin was on 07/21/2020. CEA 5.9 on 07/20/2020. Cycle # 94 FOLFIRI + Avastin was on 08/04/2020. CEA 5.5 on 08/04/2020. Cycle # 95 FOLFIRI + Avastin was on 08/25/2020. CEA 6.1 on 08/24/2020. CT chest 9/2/2020 stable unremarkable enhanced CT of the thorax. There is no metastatic disease to the lungs. CT abdomen pelvis on 9/2/2020 stable 2.2 cm low attenuated lesion within the central aspect of the right hepatic lobe favored to represent treated metastatic disease. No new hepatic lesion is identified. Stable splenomegaly  There is no appreciable colonic lesion or stricture. Pericholecystic fluid of uncertain etiology. Laparoscopic cholecystectomy with normal cholangiogram 10/27/20  Gallbladder: Chronic cholecystitis and cholelithiasis. Unremarkable regional lymph node. 11/13/2020; Seen by Dr. Chris Velasquez from General surgery team; cleared to re-start chemotherapy. Cycle # 96 FOLFIRI + Avastin was on 11/17/2020. CEA 3.6 on 11/16/2020. Cycle # 97 FOLFIRI + Avastin was on 12/01/2020. CEA 3.5 on 11/30/2020. Cycle # 98 FOLFIRI + Avastin was on 12/15/2020. CEA 4.3 on 12/15/2020. Cycle # 99 FOLFIRI + Avastin was on 01/05/2021. CEA 4.7 on 01/04/2021. CT chest 01/13/2021 negative for metastatic disease. CT abdomen/pelvis 01/13/2021 Unchanged central hepatic lesion. No specific signs of abdominopelvic metastatic disease. Continue FOLFIRI + Avastin and repeat scans in 3 months. Cycle # 100 FOLFIRI + Avastin was on 01/19/2021. CEA 4.7 on 01/18/2021. Cycle # 101 FOLFIRI + Avastin was on 02/02/2021. CEA 5.2 on 02/01/2021. Cycle # 102 FOLFIRI + Avastin was on 02/16/2021. CEA 5.6 on 02/15/2021. Cycle # 103 FOLFIRI + Avastin was on 03/02/2021. Cycle # 104 FOLFIRI (20% dose reduced due to significant toxicity) + Avastin was on 03/16/2021.   Cycle # 105 FOLFIRI (same dose as cycle # 104) + Avastin was on 03/30/2021. CEA 6.5 on 03/29/2021. Cycle # 106 FOLFIRI (same dose as cycle # 104) + Avastin was on 04/13/2021. CEA 6.0 on 04/12/2021  CT chest 04/20/2021 no evidence of metastatic disease. CT abdomen/pelvis 04/20/2021 No evidence of progressive metastatic disease. Stable 2 cm lesion in the right lobe of the liver, likely representing treated metastatic disease. Imaging reviewed. Continue FOLFIRI + Avastin and repeat scans in 3 months  Cycle # 107 FOLFIRI + Avastin was on 04/27/2021  Cycle # 108 FOLFIRI + Avastin was on 05/11/2021. CEA 6.4 on 05/10/2021. Cycle # 109 FOLFIRI (held Avastin due to upcoming surgery) on 05/25/2021. CEA 6.3 on 5/24/21  Cycle # 110 FOLFIRI (held Avastin due to upcoming surgery) on 06/08/2021. CEA 6.3 on 6/07/21. CEA 5.8 on 06/28/2021. Right inguinal/scrotol hernia repair on 07/13/2021 with Dr Juarez Chaparro with folely removal on 07/23/2021. He developed fever on 07/24/2021 and was brought to [facility] ER via EMS; Sepsis secondary to urinary tract infection; Completed Abx  CT chest 08/13/2021 no sign of recurrent or metastatic disease. CT abdomen/pelvis 08/13/2021 unchanged hepatic lesions. Splenomegaly and secondary signs of portal venous hypertension  CEA 4.0 on 08/16/2021  CEA 4.1 on 08/30/2021  Cycle # 111 FOLFIRI was on 08/31/2021. HBP team for evaluation of liver lesions; His lesion does appear to be a hemangioma as his prior metastatic deposits in his liver were hypo attenuating compared to this CT which is hyper attenuating. compared to his prior CT scans he has disappearing liver metastasis. MRI liver on 09/17/2021 No suspicious liver lesion identified. Colonoscopy GI Dr. Samaria Chaudhary on 09/20/2021 unremarkable  CEA 3.3 on 09/27/2021. CEA 3.2 on 10/25/2021. CT chest 11/08/2021: Similar chronic appearing findings. No acute process or evidence of metastatic disease identified.    CT Abdomen/pelvis 11/08/2021: No significant change in the appearance of the liver. Similar appearance of splenomegaly. No interval change. CEA 2.7 on 11/22/2021. CEA 2.5 on 12/27/2021  CEA 2.4 on 01/24/2022  CT chest/abdomen/pelvis 02/04/2022 Stable CT of the chest abdomen/ pelvis with the a 1.4 cm enhancing nodule in the right hepatic lobe which is marginally improved. Imaging reviewed. CEA 2.3 on 02/21/2022  CEA 2.4 on 03/21/2022  CEA 2.2 on 04/18/2022  CT chest abdomen pelvis 5/11/2022: progressive hepatic metastasis with increasing number and prominence of the hepatic lesions  Soft tissue density in the ileocecal valve probably fecal matter. CEA 2.2 on 05/16/2022  CEA 2.3 on 06/06/2022  PET/CT scan 06/06/2022 the recently described hepatic lesions on CT are not significantly hypermetabolic on PET relative to hepatic background activity. No generalized pattern of FDG avid metastatic disease  Imaging reviewed. HBP noted on 06/14/2022 reviewed; No evidence of any recurrence on repeat imaging. Evaluated by Dr. Carla Chisholm on 06/20/2022  CEA 2.4 on 07/02/2022  Colonoscopy on July 11, 2022 noted colon polyp x1 in the descending colon removed by cold snare polypectomy  Mild diverticulosis of the sigmoid colon  Previous cancer site and spot tattoo near the rectosigmoid junction with no gross recurrence in the lumen of the colon with biopsies of the mucosa taken  Small internal and external hemorrhoids  A. Descending colon polyp polypectomy: Tubular adenoma  B. Previous cancer SPOT biopsy:   Colonic mucosa with no significant pathologic changes  Negative for malignancy  Recommend follow-up colonoscopy in 2 years for surveillance  Operative note reviewed. Pathology reviewed. CEA 2.3 on 08/01/2022  CEA 2.1 on 08/29/2022  CT chest 09/21/2022: No evidence of metastatic disease  CT abdomen/pelvis 09/21/2022: Previously measured areas in the liver are essentially unchanged and indeterminate with no significant hypermetabolism on recent PET-CT.   No new hepatic lesion. No evidence for recurrent or metastatic disease within the abdomen or pelvis. CEA 2.1 on 09/26/2022. CEA 1.7 on 10/24/2022. CEA 2.3 on 11/21/2022  Labs reviewed. No evidence of recurrent or metastatic disease. Port flush today 11/22/2022  Recommended NSAIDs as needed for intermittent arthralgias    RTC 1 month with labs. Scans after next visit    MSI testing noted no mismatch repair protein loss of expression  NGS testing ([employer])   MS-Stable: No therapies or clinical trials  TMB 3Muts/Mb: No therapies or clinical trials  KRAS G12R: Therapies with clinical relevance in patient's tumor type: Cetuximab, Panitumumab  Therapies with clinical relevance in other tumor type: None  NRAS wild type:  Therapies with clinical relevance in patient's tumor type: Cetuximab, Panitumumab  Therapies with clinical relevance in other tumor type: None  APC: None None  MARIELA: None None    11/22/2022  Miller Weldon MD  Board Certified Medical Oncologist

## 2022-12-19 ENCOUNTER — HOSPITAL ENCOUNTER (OUTPATIENT)
Age: 72
Discharge: HOME | End: 2022-12-19
Payer: MEDICARE

## 2022-12-19 DIAGNOSIS — C20 RECTAL CANCER (HCC): ICD-10-CM

## 2022-12-19 LAB
ALBUMIN SERPL-MCNC: 4.1 G/DL (ref 3.5–5.2)
ALP BLD-CCNC: 132 U/L (ref 40–129)
ALT SERPL-CCNC: 16 U/L (ref 0–40)
ANION GAP SERPL CALCULATED.3IONS-SCNC: 13 MMOL/L (ref 7–16)
AST SERPL-CCNC: 22 U/L (ref 0–39)
BASOPHILS ABSOLUTE: 0.05 E9/L (ref 0–0.2)
BASOPHILS RELATIVE PERCENT: 0.7 % (ref 0–2)
BILIRUB SERPL-MCNC: 0.6 MG/DL (ref 0–1.2)
BUN BLDV-MCNC: 20 MG/DL (ref 6–23)
CALCIUM SERPL-MCNC: 9.9 MG/DL (ref 8.6–10.2)
CEA: 2.3 NG/ML (ref 0–5.2)
CHLORIDE BLD-SCNC: 105 MMOL/L (ref 98–107)
CO2: 22 MMOL/L (ref 22–29)
CREAT SERPL-MCNC: 1.3 MG/DL (ref 0.7–1.2)
EOSINOPHILS ABSOLUTE: 0.19 E9/L (ref 0.05–0.5)
EOSINOPHILS RELATIVE PERCENT: 2.7 % (ref 0–6)
GFR SERPL CREATININE-BSD FRML MDRD: 58 ML/MIN/1.73
GLUCOSE BLD-MCNC: 111 MG/DL (ref 74–99)
HCT VFR BLD CALC: 36.3 % (ref 37–54)
HEMOGLOBIN: 12.3 G/DL (ref 12.5–16.5)
IMMATURE GRANULOCYTES #: 0.05 E9/L
IMMATURE GRANULOCYTES %: 0.7 % (ref 0–5)
LYMPHOCYTES ABSOLUTE: 0.7 E9/L (ref 1.5–4)
LYMPHOCYTES RELATIVE PERCENT: 9.8 % (ref 20–42)
MCH RBC QN AUTO: 29.9 PG (ref 26–35)
MCHC RBC AUTO-ENTMCNC: 33.9 % (ref 32–34.5)
MCV RBC AUTO: 88.3 FL (ref 80–99.9)
MONOCYTES ABSOLUTE: 0.34 E9/L (ref 0.1–0.95)
MONOCYTES RELATIVE PERCENT: 4.7 % (ref 2–12)
NEUTROPHILS ABSOLUTE: 5.83 E9/L (ref 1.8–7.3)
NEUTROPHILS RELATIVE PERCENT: 81.4 % (ref 43–80)
PDW BLD-RTO: 13.6 FL (ref 11.5–15)
PLATELET # BLD: 150 E9/L (ref 130–450)
PMV BLD AUTO: 9.5 FL (ref 7–12)
POTASSIUM SERPL-SCNC: 4 MMOL/L (ref 3.5–5)
RBC # BLD: 4.11 E12/L (ref 3.8–5.8)
SODIUM BLD-SCNC: 140 MMOL/L (ref 132–146)
TOTAL PROTEIN: 7.5 G/DL (ref 6.4–8.3)
WBC # BLD: 7.2 E9/L (ref 4.5–11.5)

## 2022-12-19 PROCEDURE — 85025 COMPLETE CBC W/AUTO DIFF WBC: CPT

## 2022-12-19 PROCEDURE — 36415 COLL VENOUS BLD VENIPUNCTURE: CPT

## 2022-12-19 PROCEDURE — 82378 CARCINOEMBRYONIC ANTIGEN: CPT

## 2022-12-19 PROCEDURE — 80053 COMPREHEN METABOLIC PANEL: CPT

## 2022-12-20 ENCOUNTER — OFFICE VISIT (OUTPATIENT)
Dept: ONCOLOGY | Age: 72
End: 2022-12-20
Payer: MEDICARE

## 2022-12-20 ENCOUNTER — HOSPITAL ENCOUNTER (OUTPATIENT)
Dept: INFUSION THERAPY | Age: 72
Discharge: HOME | End: 2022-12-20
Payer: MEDICARE

## 2022-12-20 VITALS
DIASTOLIC BLOOD PRESSURE: 83 MMHG | BODY MASS INDEX: 30.83 KG/M2 | HEART RATE: 59 BPM | OXYGEN SATURATION: 99 % | WEIGHT: 232.6 LBS | HEIGHT: 73 IN | SYSTOLIC BLOOD PRESSURE: 134 MMHG | TEMPERATURE: 97.5 F

## 2022-12-20 DIAGNOSIS — C20 RECTAL CANCER (HCC): Primary | ICD-10-CM

## 2022-12-20 DIAGNOSIS — C20 RECTAL CANCER METASTASIZED TO LIVER (HCC): Primary | ICD-10-CM

## 2022-12-20 DIAGNOSIS — C78.7 RECTAL CANCER METASTASIZED TO LIVER (HCC): Primary | ICD-10-CM

## 2022-12-20 PROCEDURE — 96523 IRRIG DRUG DELIVERY DEVICE: CPT

## 2022-12-20 PROCEDURE — 1123F ACP DISCUSS/DSCN MKR DOCD: CPT | Performed by: INTERNAL MEDICINE

## 2022-12-20 PROCEDURE — 2580000003 HC RX 258: Performed by: INTERNAL MEDICINE

## 2022-12-20 PROCEDURE — 99214 OFFICE O/P EST MOD 30 MIN: CPT

## 2022-12-20 PROCEDURE — 99213 OFFICE O/P EST LOW 20 MIN: CPT | Performed by: INTERNAL MEDICINE

## 2022-12-20 PROCEDURE — 6360000002 HC RX W HCPCS: Performed by: INTERNAL MEDICINE

## 2022-12-20 RX ORDER — HEPARIN SODIUM (PORCINE) LOCK FLUSH IV SOLN 100 UNIT/ML 100 UNIT/ML
500 SOLUTION INTRAVENOUS PRN
Status: DISCONTINUED | OUTPATIENT
Start: 2022-12-20 | End: 2022-12-21 | Stop reason: HOSPADM

## 2022-12-20 RX ORDER — SODIUM CHLORIDE 0.9 % (FLUSH) 0.9 %
10 SYRINGE (ML) INJECTION PRN
OUTPATIENT
Start: 2022-12-20

## 2022-12-20 RX ORDER — WATER 1000 ML/1000ML
2.2 INJECTION, SOLUTION INTRAVENOUS ONCE
OUTPATIENT
Start: 2022-12-20 | End: 2022-12-20

## 2022-12-20 RX ORDER — SODIUM CHLORIDE 0.9 % (FLUSH) 0.9 %
10 SYRINGE (ML) INJECTION PRN
Status: DISCONTINUED | OUTPATIENT
Start: 2022-12-20 | End: 2022-12-21 | Stop reason: HOSPADM

## 2022-12-20 RX ORDER — HEPARIN SODIUM (PORCINE) LOCK FLUSH IV SOLN 100 UNIT/ML 100 UNIT/ML
500 SOLUTION INTRAVENOUS PRN
OUTPATIENT
Start: 2022-12-20

## 2022-12-20 RX ADMIN — SODIUM CHLORIDE, PRESERVATIVE FREE 10 ML: 5 INJECTION INTRAVENOUS at 09:35

## 2022-12-20 RX ADMIN — Medication 500 UNITS: at 09:36

## 2022-12-20 NOTE — PROGRESS NOTES
[place] [address]            Attending Clinic Note     Reason for Visit: Follow-up on a patient with Metastatic Rectal Cancer. PCP: Fernando Mo MD     History of Present Illness:  66 y/o  male who was referred to see Dr. Phill Ferreira (GI team) for evaluation of bright red blood per rectum, mild anemia and change in bowel habits with diarrhea. CEA 2640 on 10/19/2015. AlcP 299 AST 57 ALT 75 on 10/19/2015. Colonoscopy in 2013 noted no significant polyps, colitis or lesions at that time. Denies any Family History of colorectal cancer or polyps. Colonoscopy on 10/19/2015 revealed:  1. Ascending polyp, 8 mm, hot snare: Tubulovillous adenoma. 2. Transverse polyp, 1 cm, hot snare: Serrated polyp most consistent with sessile serrated adenoma. 3. Five splenic flexure polyps, three - 5 mm, 7 mm, 8 mm, hot snare and biopsy: Four segments of Tubular Adenoma  4. Descending polyp, 5 mm, biopsy: Tubular adenoma. 5. Sigmoid polyp, 4 mm biopsy, Serrated polyp most consistent with sessile serrated adenoma. 6. Two rectal polyps, 4 mm biopsy: Serrated polyp most consistent with hyperplastic polyp. 7. Rectosigmoid colon mass (large mass approximately 65% circumference of the lumen; Very friable, firm and hard): Tubulovillous adenoma with associated focal erosion and fibroplasia. CT scan abdomen/pelvis on 10/26/2015:  1. Small nodules at lung bases likely represent metastatic colon   cancer. 2. Extensive likely metastatic colon cancer throughout the liver. 3. Mild mural thickening and luminal narrowing in the   terminal ileum, otherwise nonspecific. Colonoscopy with snare removal rectal mass was performed by Dr. Elías Clark. Pathology proved:  Rectal polyp: Invasive adenocarcinoma involving villous adenoma and extending to the cauterized edge of excision. KRAS Mutation: Mutation detected.   BRAF Mutation: Mutation not detected. 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 CEA 3488. Bone scan on 11/10/2015 noted no metastatic disease. CT chest on 11/10/2015 revealed Multiple pulmonary nodules in the upper and lower lobes consistent with metastatic disease;   Hepatic metastasis also visualized. 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 Mediport was placed. Cycle # 1 of FOLFOX + Avastin was on 11/23/2015. CEA was 4555 on 11/23/2015. Cycle # 2 of FOLFOX + Avastin was on 12/08/2015. CEA was 3160 on 12/08/2015. Cycle # 3 of FOLFOX + Avastin was on 12/22/2015. CEA was 2516 on 12/21/2015. Cycle # 4 of FOLFOX + Avastin was on 01/05/2016. CEA was 2098 on 01/05/2015. Cycle # 5 of FOLFOX + Avastin was on 01/19/2016. CEA was 1511 on 01/05/2015.     -Bone scan on 01/26/2016 noted no metastatic disease. CT chest on 01/26/2016 revealed Significant response to treatment with no visible residual nodules. CT scan abdomen/pelvis on 01/26/2016 noted Interval decreased size of multiple masses in the liver compatible with treatment response. Continue another 2 months of FOLFOX + Avastin and repeat scans. Cycle # 6 of FOLFOX + Avastin was on 02/02/2016.  on 02/02/2016. Cycle # 7 of FOLFOX + Avastin was on 02/16/2016.  on 02/16/2016. Cycle # 8 of FOLFOX + Avastin was on 03/01/2016.  on 03/01/2016. Cycle # 9 of FOLFOX + Avastin was on 03/15/2016.  on 03/15/2016. Cycle # 10 of FOLFOX + Avastin was on 03/29/2016. .4 on 03/29/2016. Cycle # 11 of FOLFOX + Avastin was on 04/12/2016. .4 on 04/12/2016. Re-staging scans on 04/19/2016: CT Chest: clear lungs; no evidence of recurring pulmonary nodule; CT Abdomen/Pelvis: Further interval decrease in size of the multiple metastatic hepatic lesions, the largest lesion now measures 3.9 x 3.5 cm and previously measured 4.5 cm in maximum diameter.  No mesenteric lymphadenopathy is identified; Bone Scan: No evidence of osseous metastasis. Continue same regimen and re-stage in 2-3 months. Cycle # 12 FOLFOX + Avastin was on 04/26/2016. .5 on 04/26/2016. Cycle # 13 FOLFOX + Avastin was on 05/10/2016. .3 on 05/10/2016. Cycle # 14 FOLFOX+AVASTIN was on 05/24/2016. .5 on 05/24/2016. Cycle # 15 FOLFOX + Avastin was on 06/07/2016. CEA 90.5 on 06/07/2016. Admitted to [facility] 06/13/2016-06/16/2016 for abdominal pain: EGD noted 1.5 cm clean based duodenal bulb ulceration s/p epinephrine and bicap per Dr. Elyssa Rincon. No active bleeding. A. Stomach, biopsy: Mild chronic gastritis, immunostain negative for Helicobacter  B. Esophagus, biopsy: Gastric glandular mucosa with prominent intestinal metaplasia ([place]'s epithelium), negative for epithelial dysplasia, esophageal squamous mucosa not identified  Cycle # 16 FOLFOX (discontinued avastin (bevacizumab) given association of peptic ulcer disease and known association of GI perforation) was on 06/21/2016. Cycle # 17 FOLFOX was on 07/05/2016. CEA 52.6 on 07/19/2016. Cycle # 17 FOLFOX was on 07/05/2016. CEA 52.6 on 07/05/2016. CEA 47.6 on 07/19/2016. Re-staging scans 07/19/2106: CT Chest negative for metastatic disease. CT Abdomen/Pelvis: Stable hepatic lesions; Question new mural thickening in the cecum. Bone Scan: New Let hip lesion suspicious for bone metastasis. Increased CEA; new mural thickening in the cecum and new left hip lesion suspicious for bone metastasis are consistent with disease progression; He derived maximum benefit from FOLFOX/AVASTIN. D/C FOLFOX/AVASTIN. We recommended FOLFIRI second line therapy. Cycle # 1 FOLFIRI was on 08/02/2016. CEA 40.8 on 08/02/2016. Xgeva q4 weeks started on 08/02/2016. Cycle # 2 FOLFIRI was on 08/16/2016. CEA 31.7 on 08/16/2016. Cycle # 3 FOLFIRI (added Avastin) was on 08/30/2016 given that ulcers healed on EGD 08/15/2016 by Dr. Pearl Burton; Protonix bid since avastin re-started.    Colonoscopy on 08/29/2016 (to look at the cecal area) unremarkable. CEA 26.7 on 08/30/2016. Cycle # 4 FOLFIRI/Avastin was on 09/13/2016. CEA 23.4 on 09/13/2016. Cycle # 5 FOLFIRI/Avastin was on 09/27/2016. CEA 22.3 on 09/27/2016. Cycle # 6 FOLFIRI/Avastin was on 10/11/2016. CEA 18.4 on 10/11/2016. Bone scan 10/21/2016 stable bone metastasis; ? New lesion anterior left sixth rib likely interval healing fracture. CT abdomen/pelvis revealed stable hepatic metastasis. CT chest negative for metastatic disease. Continue FOLFIRI/Avastin and repeat scans in 3 months. Cycle # 7 FOLFIRI/Avastin was on 10/25/2016. CEA 16.1  Cycle # 8 FOLFIRI/Avastin was on 11/08/2016. CEA 15.7  Cycle # 9 FOLFIRI/Avastin was on 11/29/2016. CEA 13.6 on 11/29/2016. Cycle # 10 FOLFIRI/Avastin was on 12/13/2016. CEA 10.6 on 12/13/2016. Cycle # 11 FOLFIRI/Avastin was on 12/27/2016. CEA 11.1 on 12/27/2016. Cycle # 12 FOLFIRI/Avastin was on 01/10/2017. CEA 9.7 on 01/10/2017. CT chest 01/23/2017 No new pulmonary nodule or lymphadenopathy. Patchy groundglass opacities within the left lower lobe, suggestive of infectious or inflammatory etiology. Followup CT chest in 3 months. Slight increased linear sclerosis along the left anterior sixth rib corresponding to an area of increased uptake on the prior bone scan from 10/21/2016, favored to be related to interval healing changes of a nondisplaced fracture. CT abdomen/pelvis on 01/23/2017 Redemonstration of multiple hepatic metastases, which are similar compared to the prior CT from 10/21/2016. Redemonstration of area of increased sclerosis along the right acetabulum suspicious for metastatic disease. Bone scan on 01/23/2017 Stable subtle uptake within the left proximal diaphysis, again suggestive of metastatic disease  Decreased subtle uptake within the medial right acetabulum.    Decreased uptake within the left anterior sixth rib corresponding to linear sclerosis on the recent CT, favored to be 10/17/2017. Cycle # 33 FOLFIRI + Avastin was on 10/31/2017. CEA 6.8 on 10/31/2017. Cycle # 34 FOLFIRI + Avastin was on 11/14/2017. CEA 7.2 on 11/14/2017. Cycle # 35 FOLFIRI + Avastin was on 11/28/2017. CEA 5.1 on 11/28/2017. Cycle # 36 FOLFIRI + Avastin was on 12/12/2017. CEA 6.1 on 12/12/2017. Bone scan 12/22/2017 noted no evidence of metastatic disease to the axial or appendicular skeleton. CT chest 12/22/2017 noted no evidence of metastatic disease. CT abdomen/pelvis 12/22/2017 noted stable hypodense lesions in the liver. Continue FOLFIRI + Avastin and repeat scans in 3 months. Cycle # 37 FOLFIRI + Avastin was on 12/27/2018. Cycle # 38 FOLFIRI + Avastin was on 01/09/2018. Cycle # 39 FOLFIRI + Avastin was on 01/23/2018. Cycle # 40 FOLFIRI + Avastin was on 02/06/2018. Cycle # 41 FOLFIRI + Avastin was on 02/20/2018. Cycle # 42 FOLFIRI + Avastin was on 03/06/2018. Cycle # 43 FOLFIRI + Avastin was on 03/20/2018. Bone scan on 03/26/2018 negative for metastatic disease. CT chest 03/26/2018 noted ? new 7 mm nodule in LUCY. CT abdomen/pelvis 03/26/2018 noted stable hypodense lesions in the liver. ? New small ascites in the abdomen. Patient wants to continue to monitor the lung finding and repeat scans in 2 months instead of changing the current regimen into oral Lonsurf. Cycle # 44 FOLFIRI + Avastin was on 04/03/2018. CEA 6.3 on 04/03/2018. Cycle # 45 FOLFIRI + Avastin was on 04/24/2018. CEA 5.3 on 04/24/2018. Cycle # 46 FOLFIRI + Avastin was on 05/08/2018. CEA 5.4 on 05/08/2018. Cycle # 47 FOLFIRI + Avastin was on 05/22/2018. CEA 6.1 on 05/22/2018. CT chest 05/31/2018 noted stable nodule in LUCY. No evidence of worsening malignancy. CT abdomen/pelvis on 05/31/2018 noted stable liver metastasis. No evidence of worsening malignancy. Continue FOLFIRI + Avastin and repeat scans in 3 months. Cycle # 48 FOLFIRI + Avastin was on 06/06/2018. Cycle # 49 FOLFIRI + Avastin was on 06/19/2018. Cycle # 50 FOLFIRI + Avastin was on 07/03/2018. Cycle # 51 FOLFIRI + Avastin was on 07/24/2018. Cycle # 52 FOLFIRI + Avastin was on 08/14/2018. Cycle # 53 FOLFIRI + Avastin was on 08/28/2018. CT chest 09/06/2018 noted interval decrease in size of LUCY measuring up to 4 mm, suggestive of treatment response. No mediastinal or hilar LN  CT abdomen/pelvis 09/06/2018 Slight interval increase in size of a previously noted metastatic lesion within the right hepatic lobe. This may be related to differences in phase of enhancement compared to the most recent study from 5/31/2018, the lesion remains decreased in size compared to the more previous studies. No abdominal, retroperitoneal, or pelvic lymphadenopathy. Continue FOLFIRI + Avastin and repeat scans in 2 months. Cycle # 54 FOLFIRI + Avastin was on 09/11/2018. Cycle # 55 FOLFIRI + Avastin was on 09/25/2018. Cycle # 56 FOLFIRI + Avastin was on 10/09/2018. Cycle # 57 FOLFIRI + Avastin was on 10/23/2018. CT chest 11/02/2018 stable 3 mm nodule within LUCY. No new right and left lung nodule. No mediastinal or osseous lesion. CT abdomen/pelvis 11/02/2018 stable metastatic disease to liver. No new metastatic disease identified. No mesenteric or retroperitoneal LN. No gross colonic lesion identified. Continue FOLFIRI + Avastin and repeat scans in 3 months. Cycle # 58 FOLFIRI + Avastin was on 11/06/2018. CEA 7.6 on 11/05/2018. Cycle # 59 FOLFIRI + Avastin was on 11/27/2018. CEA 6.9 on 11/26/2018. Cycle # 60 FOLFIRI + Avastin was on 12/11/2018. CEA 6.7 on 12/11/2018. Cycle # 61 FOLFIRI + Avastin was on 01/08/2019. CEA 5.6 on 01/07/2019. Cycle # 62 FOLFIRI + Avastin was on 01/29/2019. CEA 4.6 on 01/28/2019. Cycle # 63 FOLFIRI + Avastin was on 02/12/2019. CEA 4.3 on 02/11/2019. CT chest 02/21/2019 no evidence of metastatic disease. CT abdomen/pelvis 02/21/2019 stable hypodense liver lesions. No evidence of worsening metastatic disease.  Large right decreased in size compared to the CT from 3/26/2018. No thoracic LN. CT abdomen/pelvis 12/19/2019 Previously described small hypodense lesions are more conspicuous on the current study compared to the recent study throughout the liver from 8/20/2019, however similar to the prior study from 2/21/2019. Findings may be related to differences in contrast opacification. No abdominal or pelvic lymphadenopathy. Continue FOLFIRI + Avastin and repeat scans in 2 months to f/u on liver lesions. Cycle # 81 FOLFIRI + Avastin was on 01/07/2020. CEA 3.8 on 01/06/2020. Cycle # 82 FOLFIRI + Avastin was on 01/21/2020. CEA 3.7 on 01/20/2020. Cycle # 83 FOLFIRI + Avastin was on 02/04/2020. CEA 4.1 on 02/03/2020. Cycle # 84 FOLFIRI + Avastin was on 02/18/2020. CEA 4.6 on 02/17/2020. EGD by Dr. Cher Fabian 03/02/2020 showing no masses or lesions  Cycle # 85 FOLFIRI + Avastin was on 03/03/2020. CEA 7.4 on 03/02/2020. Cycle # 86 FOLFIRI + Avastin was on 03/17/2020. CEA 4.5 on 03/16/2020. Colonoscopy on 03/23/2020 by Dr. Cher Fabian unremarkable (records requested). Cycle # 87 FOLFIRI + Avastin was on 03/31/2020. CEA 4.1 on 03/30/2020. Cycle # 88 FOLFIRI + Avastin was on 04/21/2020. CEA 6.4 on 04/20/2020. Cycle # 89 FOLFIRI + Avastin was on 05/05/2020. CEA 5.8 on 05/04/2020. Cycle # 90 FOLFIRI + Avastin was on 06/02/2020. CEA 5.5 on 06/01/2020. Cycle # 91 FOLFIRI + Avastin was on 06/16/2020. CEA 5.6 on 06/15/2020. CT chest 06/23/2020 noted no metastatic disease in the chest.   CT abdomen/pelvis 06/23/2020 Stable small liver lesions measuring up to 5 mm since 2019.   22 mm round mass in segment 8 of the liver with central high density stable from December 2019), previously measuring up to 34 mm in 2017. No additional metastatic disease in the abdomen or pelvis. Small amount of ascites. Overall stable disease. Continue FOLFIRI + Avastin and repeat scans in 2-3 months. Cycle # 92 FOLFIRI + Avastin was on 07/07/2020.  CEA 5.7 on 07/06/2020. Cycle # 93 FOLFIRI + Avastin was on 07/21/2020. CEA 5.9 on 07/20/2020. Cycle # 94 FOLFIRI + Avastin was on 08/04/2020. CEA 5.5 on 08/04/2020. Cycle # 95 FOLFIRI + Avastin was on 08/25/2020. CEA 6.1 on 08/24/2020. CT chest 9/2/2020 stable unremarkable enhanced CT of the thorax. There is no metastatic disease to the lungs. CT abdomen pelvis on 9/2/2020 stable 2.2 cm low attenuated lesion within the central aspect of the right hepatic lobe favored to represent treated metastatic disease. No new hepatic lesion is identified. Stable splenomegaly  There is no appreciable colonic lesion or stricture  Pericholecystic fluid of uncertain etiology. General surgery team consulted. Laparoscopic cholecystectomy with normal cholangiogram 10/27/20  Gallbladder: Chronic cholecystitis and cholelithiasis. Unremarkable regional lymph node. 11/13/2020; Seen by Dr. Braxton Lo from General surgery team; cleared to re-start chemotherapy. Cycle # 96 FOLFIRI + Avastin was on 11/17/2020. CEA 3.6 on 11/16/2020. Cycle # 97 FOLFIRI + Avastin was on 12/01/2020. CEA 3.5 on 11/30/2020. Cycle # 98 FOLFIRI + Avastin was on 12/15/2020. CEA 4.3 on 12/15/2020. Cycle # 99 FOLFIRI + Avastin was on 01/05/2021. CEA 4.7 on 01/04/2021. CT chest 01/13/2021 negative for metastatic disease. CT abdomen/pelvis 01/13/2021 Unchanged central hepatic lesion. No specific signs of abdominopelvic metastatic disease. Continue FOLFIRI + Avastin and repeat scans in 3 months. Cycle # 100 FOLFIRI + Avastin was on 01/19/2021. CEA 4.7 on 01/18/2021. Cycle # 101 FOLFIRI + Avastin was on 02/02/2021. CEA 5.2 on 02/01/2021. Cycle # 102 FOLFIRI + Avastin was on 02/16/2021. CEA 5.6 on 02/15/2021. Cycle # 103 FOLFIRI + Avastin was on 03/02/2021. Cycle # 104 FOLFIRI (20% dose reduced due to significant toxicity) + Avastin was on 03/16/2021. Cycle # 105 FOLFIRI (same dose as cycle # 104) + Avastin was on 03/30/2021.  CEA 6.5 on 03/29/2021. Cycle # 106 FOLFIRI (same dose as cycle # 104) + Avastin was on 04/13/2021. CEA 6.0 on 04/12/2021  CT chest 04/20/2021 no evidence of metastatic disease. CT abdomen/pelvis 04/20/2021 No evidence of progressive metastatic disease. Stable 2 cm lesion in the right lobe of the liver, likely representing treated metastatic disease. Imaging reviewed. Continue FOLFIRI + Avastin and repeat scans in 3 months  Cycle # 107 FOLFIRI + Avastin was on 04/27/2021  Cycle # 108 FOLFIRI + Avastin was on 05/11/2021. Cycle # 109 FOLFIRI (held Avastin due to upcoming surgery) on 05/25/2021. CEA 6.3 on 5/24/21  Cycle # 110 FOLFIRI (held Avastin due to upcoming surgery) on 06/08/2021. CEA 6.3 on 6/07/21. Right inguinal/scrotol hernia repair on 07/13/2021 with Dr Knight with folely removal on Friday 07/23/2021. He developed fever on 07/24/2021 and was brought to [address] [place], [place] [address] ER via EMS; Sepsis secondary to urinary tract infection   Completed Abx  CEA 4.1 on 08/30/2021  Cycle # 111 FOLFIRI was on 08/31/2021. HBP team for evaluation of liver lesions; His lesion does appear to be a hemangioma as his prior metastatic deposits in his liver were hypo attenuating compared to this CT which is hyper attenuating. compared to his prior CT scans he has disappearing liver metastasis. MRI liver on 09/17/2021 No suspicious liver lesion identified. Colonoscopy GI Dr. Odilon Scherer on 09/20/2021 unremarkable  CEA 3.3 on 09/27/2021. CEA 3.2 on 10/25/2021. CEA 2.7 on 11/22/2021. CEA 2.5 on 12/27/2021. CEA 2.4 on 01/24/2022. CEA 2.3 on 02/21/2022. CEA 2.4 on 03/21/2022. CEA 2.2 on 04/18/2022  CEA 2.2 on 05/16/2022  CEA 2.3 on 06/06/2022  HBP note 06/14/2022 reviewed. No evidence of recurrence on repeat imaging.   CEA 2.4 on 07/02/2022  CEA 2.3 on 08/01/2022  CEA 2.1 on 08/29/2022    CT chest 09/21/2022: No evidence of metastatic disease  CT abdomen/pelvis 09/21/2022: Previously measured areas in the liver are essentially unchanged and indeterminate with no significant hypermetabolism on recent PET-CT. No new hepatic lesion. No evidence for recurrent or metastatic disease within the abdomen or pelvis. CEA 2.1 on 09/26/2022. CEA 1.7 on 10/24/2022. CEA 2.3 on 11/21/2022. CEA 2.3 on 12/19/2022. Today 12/20/2022; No fever chills. Fair appetite and energy level. No chest pain or shortness of breath. No nausea vomiting. Intermittent arthralgia    Review of Systems;  CONSTITUTIONAL: No fever, chills. Fair appetite and energy level  ENMT: Eyes: No diplopia; Nose: No epistaxis. Mouth:No lesions  RESPIRATORY: No hemoptysis, shortness of breath. CARDIOVASCULAR: No chest pain, palpitations. GASTROINTESTINAL:No N/V, abdominal pain. GENITOURINARY: No dysuria, urinary frequency, hematuria. MSK: Intermittent Arthritis   NEURO: No syncope, presyncope, headache. Remainder ROS: Negative. Past Medical History:   Past Medical History               Diagnosis Date    Arthritis      Cancer ([facility] [address]         colon    Depression      Hyperlipidemia      Hypertension           Medications:  Reviewed and reconciled. Allergies: Allergies   Allergen Reactions    Neosporin [Neomycin-Polymyxin-Gramicidin] Rash    Tape [place] Bougie Tape] Rash      Physical Exam:  /83   Pulse 59   Temp 97.5 °F (36.4 °C)   Ht 6' 1\" (1.854 m)   Wt 232 lb 9.6 oz (105.5 kg)   SpO2 99%   BMI 30.69 kg/m²   GENERAL: Alert, oriented x 3, not in distress  HEENT: PERRLA, EOMI. No oral lesions   EXTREMITIES: Without clubbing, cyanosis  NEUROLOGIC: No focal deficits.    ECOG PS 1    Diagnostics:  Lab Results   Component Value Date    WBC 7.2 12/19/2022    HGB 12.3 (L) 12/19/2022    HCT 36.3 (L) 12/19/2022    MCV 88.3 12/19/2022     12/19/2022     Lab Results   Component Value Date     12/19/2022    K 4.0 12/19/2022     12/19/2022    CO2 22 12/19/2022    BUN 20 12/19/2022    CREATININE 1.3 (H) 12/19/2022    GLUCOSE 111 (H) 12/19/2022    CALCIUM 9.9 12/19/2022    PROT 7.5 12/19/2022    LABALBU 4.1 12/19/2022    BILITOT 0.6 12/19/2022    ALKPHOS 132 (H) 12/19/2022    AST 22 12/19/2022    ALT 16 12/19/2022    LABGLOM 58 12/19/2022    GFRAA >60 09/26/2022     Lab Results   Component Value Date    CEA 2.3 12/19/2022     Impression/Plan:  68 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 KRAS Mutation: Mutation detected. BRAF Mutation: Mutation not detected. 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 CT scan abdomen/pelvis on 10/26/2015 revealed small nodules at the lung bases, extensive liver lesions consistent with metastatic rectosigmoid cancer. CEA 3488 on 10/30/2015. Bone scan on 11/10/2015 noted no metastatic disease. CT chest on 11/10/2015 revealed Multiple pulmonary nodules in the upper and lower lobes consistent with metastatic disease; Hepatic metastasis also visualized. 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 Mediport was placed. Cycle # 1 of FOLFOX + Avastin was on 11/23/2015. CEA was 4555 on 11/23/2015. Cycle # 2 of FOLFOX + Avastin was on 12/08/2015. CEA was 3160 on 12/08/2015. Cycle # 3 of FOLFOX + Avastin was on 12/22/2015. CEA was 2516 on 12/21/2015. Cycle # 4 of FOLFOX + Avastin was on 01/05/2016. CEA was 2098 on 01/05/2015. Cycle # 5 of FOLFOX + Avastin was on 01/19/2016. CEA was 1511 on 01/05/2015.     -Bone scan on 01/26/2016 noted no metastatic disease. CT chest on 01/26/2016 revealed Significant response to treatment with no visible residual nodules. CT scan abdomen/pelvis on 01/26/2016 noted Interval decreased size of multiple masses in the liver compatible with treatment response. Continue another 2 months of FOLFOX + Avastin and repeat scans. Cycle # 6 of FOLFOX + Avastin was on 02/02/2016.  on 02/02/2016. Cycle # 7 of FOLFOX + Avastin was on 02/16/2016.  on 02/16/2016. Cycle # 8 of FOLFOX + Avastin was on 03/01/2016.  CEA 557 on 03/01/2016. Cycle # 9 of FOLFOX + Avastin was on 03/15/2016.  on 03/15/2016. Cycle # 10 of FOLFOX + Avastin was on 03/29/2016. .4 on 03/29/2016. Cycle # 11 of FOLFOX + Avastin was on 04/12/2016. .4 on 04/12/2016. Re-staging scans on 04/19/2016: CT Chest: clear lungs; no evidence of recurring pulmonary nodule; CT Abdomen/Pelvis: Further interval decrease in size of the multiple metastatic hepatic lesions, the largest lesion now measures 3.9 x 3.5 cm and previously measured 4.5 cm in maximum diameter. No mesenteric lymphadenopathy is identified; Bone Scan: No evidence of osseous metastasis. Continue same regimen and re-stage in 2-3 months. Cycle # 12 FOLFOX + Avastin was on 04/26/2016. .5 on 04/26/2016. Cycle # 13 FOLFOX + Avastin was on 05/10/2016. .3 on 05/10/2016. Cycle # 14 FOLFOX+AVASTIN was on 05/24/2016. .5 on 05/24/2016. Cycle # 15 FOLFOX + Avastin was on 06/07/2016. CEA 90.5 on 06/07/2016. Admitted to [facility] 06/13/2016-06/16/2016 for abdominal pain: EGD noted 1.5 cm clean based duodenal bulb ulceration s/p epinephrine and bicap per Dr. Felix Bobby. No active bleeding. A. Stomach, biopsy: Mild chronic gastritis, immunostain negative for Helicobacter  B. Esophagus, biopsy: Gastric glandular mucosa with prominent ntestinal metaplasia ([place]'s epithelium), negative for epithelial dysplasia, esophageal squamous mucosa not identified     Cycle # 16 FOLFOX (discontinued avastin (bevacizumab) given association of peptic ulcer disease and known association of GI perforation.) was on 06/21/2016. CEA 47 on 06/21/2016. Cycle # 17 FOLFOX was on 07/05/2016. CEA 52.6 on 07/05/2016. CEA 47.6 on 07/19/2016. Re-staging scans 07/19/2106: CT Chest negative for metastatic disease. CT Abdomen/Pelvis: Stable hepatic lesions; Question new mural thickening in the cecum. Bone Scan: New Let hip lesion suspicious for bone metastasis.        Increased CEA; new mural thickening in the cecum and new left hip lesion suspicious for bone metastasis are consistent with disease progression; He derived maximum benefit from FOLFOX/AVASTIN. D/C FOLFOX/AVASTIN. We recommended FOLFIRI second line therapy. Cycle # 1 FOLFIRI was on 08/02/2016. CEA 40.8 on 08/02/2016. Xgeva q4 weeks started on 08/02/2016. Cycle # 2 FOLFIRI was on 08/16/2016. CEA 31.7 on 08/16/2016. Cycle # 3 FOLFIRI (added Avastin) was on 08/30/2016 given that ulcers healed on EGD 08/15/2016 by Dr. Jamila Lindo; Protonix bid since avastin re-started. Colonoscopy on 08/29/2016 (to look at the cecal area) unremarkable. CEA 26.7 on 08/30/2016. Cycle # 4 FOLFIRI/Avastin was on 09/13/2016. CEA 23.4 on 09/13/2016. Cycle # 5 FOLFIRI/Avastin was on 09/27/2016. CEA 22.3 on 09/27/2016. Cycle # 6 FOLFIRI/Avastin was on 10/11/2016. CEA 18.4 on 10/11/2016. Bone scan 10/21/2016 stable bone metastasis; ? New lesion anterior left sixth rib likely interval healing fracture. CT abdomen/pelvis revealed stable hepatic metastasis. CT chest negative for metastatic disease. Continue FOLFIRI/Avastin and repeat scans in 3 months. Cycle # 7 FOLFIRI/Avastin was on 10/25/2016. CEA 16.1 on 10/25/2016. Cycle # 8 FOLFIRI/Avastin was on 11/08/2016. CEA 15.7 on 11/08/2016. Cycle # 9 FOLFIRI/Avastin was on 11/29/2016. CEA 13.6 on 11/29/2016. Cycle # 10 FOLFIRI/Avastin was on 12/13/2016. CEA 10.6 on 12/13/2016. Cycle # 11 FOLFIRI/Avastin was on 12/27/2016. CEA 11.1 on 12/27/2016. Cycle # 12 FOLFIRI/Avastin was on 01/10/2017. CEA 9.7 on 01/10/2017. CT chest 01/23/2017 No new pulmonary nodule or lymphadenopathy. Patchy groundglass opacities within the left lower lobe, suggestive of infectious or inflammatory etiology. Followup CT chest in 3 months.  Slight increased linear sclerosis along the left anterior sixth rib corresponding to an area of increased uptake on the prior bone scan from 10/21/2016, favored to be related to interval healing changes of a nondisplaced fracture. CT abdomen/pelvis on 01/23/2017 Redemonstration of multiple hepatic metastases, which are similar compared to the prior CT from 10/21/2016. Redemonstration of area of increased sclerosis along the right acetabulum suspicious for metastatic disease. Bone scan on 01/23/2017 Stable subtle uptake within the left proximal diaphysis, again suggestive of metastatic disease  Decreased subtle uptake within the medial right acetabulum. Decreased uptake within the left anterior sixth rib corresponding to linear sclerosis on the recent CT, favored to be related to an joint rib fracture. Continue FOLFIRI + Avastin and repeat scans in 3 months. Cycle # 13 FOLFIRI + Avastin was on 01/24/2017. Cycle # 14 FOLFIRI + Avastin was on 02/07/2017. Cycle # 15 FOLFIRI + Avastin was on 02/21/2017. Cycle # 16 FOLFIRI + Avastin was on 03/07/2017. Cycle # 17 FOLFIRI + Avastin was on 03/21/2017. Cycle # 18 FOLFIRI + Avastin was on 04/04/2017. CT chest 04/17/2017 negative for metastatic disease. CT abdomen/pelvis 04/17/2017 Stable hypodense metastatic lesions within the liver. Stable area of increased sclerosis along the right acetabulum  Bone scan 04/17/2017 Stable subtle uptake within the left proximal femoral diaphysis; No definite abnormal uptake in the region of a sclerotic lesion along the posterior column of the right acetabulum noted on CT. Stable slight uptake within the left anterior sixth rib corresponding to linear sclerosis on the recent CT, favored to be related to a fracture. Continue FOLFIRI + Avastin and repeat scans in 3 months. Cycle # 19 FOLFIRI + Avastin was on 04/18/2017. CEA 7.5 on 04/18/2017. Cycle # 20 FOLFIRI + Avastin was on 05/02/2017. CEA 7.7 on 05/02/2017. Cycle # 21 FOLFIRI + Avastin was on 05/16/2017. CEA 7.1 on 05/16/2017. Cycle # 22 FOLFIRI + Avastin was on 05/30/2017. CEA 8.2 on 05/30/2017.   Cycle # 23 FOLFIRI + Avastin was on 06/13/2017. CEA 7.7 on 06/13/2017. Cycle # 24 FOLFIRI + Avastin was on 06/27/2017. CEA 6.6 on 06/27/2017. Re-staging scans 07/05/2017:  Bone scan stable proximal left femoral diaphysis, right acetabulum, and left anterior sixth rib lesions;  CT abdomen/pelvis stable hypodense metastatic lesions within the liver; CT chest without convincing evidence of metastatic disease. Cycle # 25 FOLFIRI + Avastin was on 07/11/2017. CEA 6.3 on 07/11/2017. Cycle # 26 FOLFIRI + Avastin was on 07/25/2017. CEA 7.3 on 07/25/2017. Cycle # 27 FOLFIRI + Avastin was on 08/08/2017. CEA 6.5 on 08/08/2017. Cycle # 28 FOLFIRI + Avastin was on 08/22/2017. CEA 5.9 on 08/22/2017. Cycle # 29 FOLFIRI + Avastin was on 09/05/2017. CEA 6.3 on 09/05/2017. Cycle # 30 FOLFIRI + Avastin was on 09/19/2017. CEA 6.3 on 09/19/2017. Bone scan 09/26/2017 stable. CT abdomen/pelvis on 09/26/2017 Stable hypodense mass in the liver. Stable sclerotic lesion in the acetabulum. CT chest 09/26/2017 negative for metastatic disease. Cycle # 31 FOLFIRI + Avastin was on 10/03/2017. CEA 7.4 on 10/03/2017. Cycle # 32 FOLFIRI + Avastin was on 10/17/2017. CEA 6.0 on 10/17/2017. Cycle # 33 FOLFIRI + Avastin was on 10/31/2017. CEA 6.8 on 10/31/2017. Cycle # 34 FOLFIRI + Avastin was on 11/14/2017. CEA 7.2 on 11/14/2017. Cycle # 35 FOLFIRI + Avastin was on 11/28/2017. CEA 5.1 on 11/28/2017. Cycle # 36 FOLFIRI + Avastin was on 12/12/2017. CEA 6.1 on 12/12/2017. Bone scan 12/22/2017 noted no evidence of metastatic disease to the axial or appendicular skeleton. CT chest 12/22/2017 noted no evidence of metastatic disease. CT abdomen/pelvis 12/22/2017 noted stable hypodense lesions in the liver. Continue FOLFIRI + Avastin and repeat scans in 3 months. Cycle # 37 FOLFIRI + Avastin was on 12/27/2018. CEA 6.7 on 12/27/2017. Cycle # 38 FOLFIRI + Avastin was on 01/09/2018. CEA 5.0 on 01/09/2018. Cycle # 39 FOLFIRI + Avastin was on 01/23/2018.  CEA 6.5 on 01/23/2018. Cycle # 40 FOLFIRI + Avastin was on 02/06/2018. CEA 6.9 on 02/06/2018. Cycle # 41 FOLFIRI + Avastin was on 02/20/2018. CEA 6.4 on 02/20/2018. Cycle # 42 FOLFIRI + Avastin was on 03/06/2018. CEA 6.6 on 03/06/2018. Cycle # 43 FOLFIRI + Avastin was on 03/20/2018. CEA 5.7 on 03/20/2018. Bone scan on 03/26/2018 negative for metastatic disease. CT chest 03/26/2018 noted ? new 7 mm nodule in LUCY. CT abdomen/pelvis 03/26/2018 noted stable hypodense lesions in the liver. ? New small ascites in the abdomen. Patient wants to continue to monitor the lung finding and repeat scans in 2 months instead of changing the current chemotherapy regimen to oral Lonsurf. Cycle # 44 FOLFIRI + Avastin was on 04/03/2018. CEA 6.3 on 04/03/2018. Cycle # 45 FOLFIRI + Avastin was on 04/24/2018. CEA 5.3 on 04/24/2018. Cycle # 46 FOLFIRI + Avastin was on 05/08/2018. CEA 5.4 on 05/08/2018. Cycle # 47 FOLFIRI + Avastin was on 05/22/2018. CEA 6.1 on 05/22/2018. CT chest 05/31/2018 noted stable nodule in LUCY. No evidence of worsening malignancy. CT abdomen/pelvis on 05/31/2018 noted stable liver metastasis. No evidence of worsening malignancy. Continue FOLFIRI + Avastin and repeat scans in 3 months. Cycle # 48 FOLFIRI + Avastin was on 06/06/2018. CEA 6.3 on 06/05/2018. Cycle # 49 FOLFIRI + Avastin was on 06/19/2018. CEA 6.1 on 06/19/2018. Cycle # 50 FOLFIRI + Avastin was on 07/03/2018. CEA 7.4 on 07/03/2018. Cycle # 51 FOLFIRI + Avastin was on 07/24/2018. CEA 6.7 on 07/24/2018. Cycle # 52 FOLFIRI + Avastin was on 08/14/2018. CEA 6.8 on 08/14/2018. Cycle # 53 FOLFIRI + Avastin was on 08/28/2018. CEA 6.5 on 08/27/2018. CT chest 09/06/2018 noted interval decrease in size of LUCY measuring up to 4 mm, suggestive of treatment response. No mediastinal or hilar LN  CT abdomen/pelvis 09/06/2018 Slight interval increase in size of a previously noted metastatic lesion within the right hepatic lobe.  This may be related to differences in phase of enhancement compared to the most recent study from 5/31/2018, the lesion remains decreased in size compared to the more previous studies. No abdominal, retroperitoneal, or pelvic lymphadenopathy. Continue FOLFIRI + Avastin and repeat scans in 2 months. Cycle # 54 FOLFIRI + Avastin was on 09/11/2018. CEA 6.4 on 09/10/2018. Cycle # 55 FOLFIRI + Avastin was on 09/25/2018. CEA 6.4 on 09/25/2018. Cycle # 56 FOLFIRI + Avastin was on 10/09/2018. CEA 6.7 on 10/08/2018. Cycle # 57 FOLFIRI + Avastin was on 10/23/2018. CEA 7.2 on 10/22/2018. CT chest 11/02/2018 stable 3 mm nodule within LUCY. No new right and left lung nodule. No mediastinal or osseous lesion. CT abdomen/pelvis 11/02/2018 stable metastatic disease to liver. No new metastatic disease identified. No mesenteric or retroperitoneal LN. No gross colonic lesion identified. Continue FOLFIRI + Avastin and repeat scans in 3 months. Cycle # 58 FOLFIRI + Avastin was on 11/06/2018. CEA 7.6 on 11/05/2018. Cycle # 59 FOLFIRI + Avastin was on 11/27/2018. CEA 6.9 on 11/26/2018. Cycle # 60 FOLFIRI + Avastin was on 12/11/2018. CEA 6.7 on 12/11/2018. Cycle # 61 FOLFIRI + Avastin was on 01/08/2019. CEA 5.6 on 01/07/2019. Cycle # 62 FOLFIRI + Avastin was on 01/29/2019. CEA 4.6 on 01/28/2019. Cycle # 63 FOLFIRI + Avastin was on 02/12/2019. CEA 4.3 on 02/11/2019. CT chest 02/21/2019 no evidence of metastatic disease. CT abdomen/pelvis 02/21/2019 stable hypodense liver lesions. No evidence of worsening metastatic disease. 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 Ordered MRI thoracic spine and referred to neurosurgery team.  Cycle # 64 FOLFIRI + Avastin was on 02/26/2019. CEA 4.7 on 02/25/2019. Cycle # 65 FOLFIRI + Avastin was on 03/12/2019. CEA 4.0 on 03/11/2019. Cycle # 66 FOLFIRI + Avastin was on 03/26/2019. CEA 4.7 on 03/25/2019. Cycle # 67 FOLFIRI + Avastin was on 04/09/2019.  CEA 5.6 on 04/08/2019. Cycle # 68 FOLFIRI + Avastin was on 04/30/2019. CEA 4.9 on 04/29/2019. Cycle # 69 FOLFIRI + Avastin was on 05/14/2019. CEA 5.3 on 05/14/2019. CT chest 05/23/2019 stable small lung nodule with no evidence of metastatic disease. CT abdomen/pelvis 05/23/2019 Decrease conspicuity of the liver lesions. No new lesions seen. Stable splenomegaly. Continue FOLFIRI + Avastin and repeat scans in 3 months. Cycle # 70 FOLFIRI + Avastin was on 06/04/2019. CEA 4.8 on 06/03/2019. Cycle # 71 FOLFIRI + Avastin was on 06/18/2019. CEA 4.6 on 06/17/2019. Cycle # 72 FOLFIRI + Avastin was on 07/09/2019. CEA 4.3 on 07/08/2019. Cycle # 73 FOLFIRI + Avastin was on 07/30/2019. CEA 5.0 on 07/29/2019. Cycle # 74 FOLFIRI + Avastin was on 08/13/2019. CEA 5.0 on 08/12/2019. CT chest 08/20/2019 negative  CT abdomen/pelvis 08/20/2019 Previous identified hepatic lesions are not visualized on the current exam.  Continue FOLFIRI + Avastin and repeat scans in 3 months. Cycle # 75 FOLFIRI + Avastin was on 08/27/2019. CEA 5.0 on 08/26/2019. Cycle # 76 FOLFIRI + Avastin was on 09/17/2019. CEA 5.5 on 09/16/2019. Cycle # 77 FOLFIRI + Avastin was on 10/15/2019. CEA 4.6 on 10/15/2019. Cycle # 78 FOLFIRI + Avastin was on 10/29/2019. CEA 4.1 on 10/28/2019. Cycle # 79 FOLFIRI + Avastin was on 11/12/2019. CEA 4.3 on 11/12/2019. Cycle # 80 FOLFIRI + Avastin was on 12/10/2019. CEA 4.0 on 12/09/2019. CT chest 12/19/2019 Stable 3 mm nodule within the left upper lobe, similar to the previous studies dating back to 11/2/2018, however decreased in size compared to the CT from 3/26/2018. No thoracic LN. CT abdomen/pelvis 12/19/2019 Previously described small hypodense lesions are more conspicuous on the current study compared to the recent study throughout the liver from 8/20/2019, however similar to the prior study from 2/21/2019. Findings may be related to differences in contrast opacification.   No abdominal or pelvic lymphadenopathy. Continue FOLFIRI + Avastin and repeat scans in 2 months to f/u on liver lesions. Cycle # 81 FOLFIRI + Avastin was on 01/07/2020. CEA 3.8 on 01/06/2020. Cycle # 82 FOLFIRI + Avastin was on 01/21/2020. CEA 3.7 on 01/20/2020. Cycle # 83 FOLFIRI + Avastin was on 02/04/2020. CEA 4.1 on 02/03/2020. Cycle # 84 FOLFIRI + Avastin was on 02/18/2020. CEA 4.6 on 02/17/2020. CT chest 2/28/2020 no evidence of metastatic disease to the lungs and no mediastinal or hilar adenopathy. CT abdomen pelvis 2/28/2020 no enhancing lesions seen within the liver to suggest metastatic disease. Wall thickening of the rectosigmoid junction. Images reviewed. Continue FOLFIRI Avastin and repeat scans in 3 months  EGD by Dr. Liliya Mojica 03/02/2020 showing no masses or lesions. Cycle # 85 FOLFIRI + Avastin was on 03/03/2020. CEA 7.4 on 03/02/2020. Cycle # 86 FOLFIRI + Avastin was on 03/17/2020. CEA 4.5 on 03/16/2020. Colonoscopy on 03/23/2020 by Dr. Liliya aguero (records requested). Cycle # 87 FOLFIRI + Avastin was on 03/31/2020. CEA 4.1 on 03/30/2020. Cycle # 88 FOLFIRI + Avastin was on 04/21/2020. CEA 6.4 on 04/20/2020. Cycle # 89 FOLFIRI + Avastin was on 05/05/2020. CEA 5.8 on 05/04/2020. Cycle # 90 FOLFIRI + Avastin was on 06/02/2020. CEA 5.5 on 06/01/2020. Cycle # 91 FOLFIRI + Avastin was on 06/16/2020. CEA 5.6 on 06/15/2020. CT chest 06/23/2020 noted no metastatic disease in the chest.   CT abdomen/pelvis 06/23/2020 Stable small liver lesions measuring up to 5 mm since 2019.   22 mm round mass in segment 8 of the liver with central high density stable from December 2019), previously measuring up to 34 mm in 2017. No additional metastatic disease in the abdomen or pelvis. Small amount of ascites. Overall stable disease. Continue FOLFIRI + Avastin and repeat scans in 2-3 months. Cycle # 92 FOLFIRI + Avastin was on 07/07/2020. CEA 5.7 on 07/06/2020.    Cycle # 93 FOLFIRI + Avastin was on 07/21/2020. CEA 5.9 on 07/20/2020. Cycle # 94 FOLFIRI + Avastin was on 08/04/2020. CEA 5.5 on 08/04/2020. Cycle # 95 FOLFIRI + Avastin was on 08/25/2020. CEA 6.1 on 08/24/2020. CT chest 9/2/2020 stable unremarkable enhanced CT of the thorax. There is no metastatic disease to the lungs. CT abdomen pelvis on 9/2/2020 stable 2.2 cm low attenuated lesion within the central aspect of the right hepatic lobe favored to represent treated metastatic disease. No new hepatic lesion is identified. Stable splenomegaly  There is no appreciable colonic lesion or stricture. Pericholecystic fluid of uncertain etiology. Laparoscopic cholecystectomy with normal cholangiogram 10/27/20  Gallbladder: Chronic cholecystitis and cholelithiasis. Unremarkable regional lymph node. 11/13/2020; Seen by Dr. William Goyal from General surgery team; cleared to re-start chemotherapy. Cycle # 96 FOLFIRI + Avastin was on 11/17/2020. CEA 3.6 on 11/16/2020. Cycle # 97 FOLFIRI + Avastin was on 12/01/2020. CEA 3.5 on 11/30/2020. Cycle # 98 FOLFIRI + Avastin was on 12/15/2020. CEA 4.3 on 12/15/2020. Cycle # 99 FOLFIRI + Avastin was on 01/05/2021. CEA 4.7 on 01/04/2021. CT chest 01/13/2021 negative for metastatic disease. CT abdomen/pelvis 01/13/2021 Unchanged central hepatic lesion. No specific signs of abdominopelvic metastatic disease. Continue FOLFIRI + Avastin and repeat scans in 3 months. Cycle # 100 FOLFIRI + Avastin was on 01/19/2021. CEA 4.7 on 01/18/2021. Cycle # 101 FOLFIRI + Avastin was on 02/02/2021. CEA 5.2 on 02/01/2021. Cycle # 102 FOLFIRI + Avastin was on 02/16/2021. CEA 5.6 on 02/15/2021. Cycle # 103 FOLFIRI + Avastin was on 03/02/2021. Cycle # 104 FOLFIRI (20% dose reduced due to significant toxicity) + Avastin was on 03/16/2021. Cycle # 105 FOLFIRI (same dose as cycle # 104) + Avastin was on 03/30/2021. CEA 6.5 on 03/29/2021. Cycle # 106 FOLFIRI (same dose as cycle # 104) + Avastin was on 04/13/2021.  CEA 6.0 on 04/12/2021  CT chest 04/20/2021 no evidence of metastatic disease. CT abdomen/pelvis 04/20/2021 No evidence of progressive metastatic disease. Stable 2 cm lesion in the right lobe of the liver, likely representing treated metastatic disease. Imaging reviewed. Continue FOLFIRI + Avastin and repeat scans in 3 months  Cycle # 107 FOLFIRI + Avastin was on 04/27/2021  Cycle # 108 FOLFIRI + Avastin was on 05/11/2021. CEA 6.4 on 05/10/2021. Cycle # 109 FOLFIRI (held Avastin due to upcoming surgery) on 05/25/2021. CEA 6.3 on 5/24/21  Cycle # 110 FOLFIRI (held Avastin due to upcoming surgery) on 06/08/2021. CEA 6.3 on 6/07/21. CEA 5.8 on 06/28/2021. Right inguinal/scrotol hernia repair on 07/13/2021 with Dr Raúl Grey with folely removal on 07/23/2021. He developed fever on 07/24/2021 and was brought to [address] [place], [place] [address] ER via EMS; Sepsis secondary to urinary tract infection; Completed Abx  CT chest 08/13/2021 no sign of recurrent or metastatic disease. CT abdomen/pelvis 08/13/2021 unchanged hepatic lesions. Splenomegaly and secondary signs of portal venous hypertension  CEA 4.0 on 08/16/2021  CEA 4.1 on 08/30/2021  Cycle # 111 FOLFIRI was on 08/31/2021. HBP team for evaluation of liver lesions; His lesion does appear to be a hemangioma as his prior metastatic deposits in his liver were hypo attenuating compared to this CT which is hyper attenuating. compared to his prior CT scans he has disappearing liver metastasis. MRI liver on 09/17/2021 No suspicious liver lesion identified. Colonoscopy GI Dr. Prem Sam on 09/20/2021 unremarkable  CEA 3.3 on 09/27/2021. CEA 3.2 on 10/25/2021. CT chest 11/08/2021: Similar chronic appearing findings. No acute process or evidence of metastatic disease identified. CT Abdomen/pelvis 11/08/2021: No significant change in the appearance of the liver. Similar appearance of splenomegaly. No interval change. CEA 2.7 on 11/22/2021.   CEA 2.5 on 12/27/2021  CEA 2.4 on 01/24/2022  CT chest/abdomen/pelvis 02/04/2022 Stable CT of the chest abdomen/ pelvis with the a 1.4 cm enhancing nodule in the right hepatic lobe which is marginally improved. Imaging reviewed. CEA 2.3 on 02/21/2022  CEA 2.4 on 03/21/2022  CEA 2.2 on 04/18/2022  CT chest abdomen pelvis 5/11/2022: progressive hepatic metastasis with increasing number and prominence of the hepatic lesions  Soft tissue density in the ileocecal valve probably fecal matter. CEA 2.2 on 05/16/2022  CEA 2.3 on 06/06/2022  PET/CT scan 06/06/2022 the recently described hepatic lesions on CT are not significantly hypermetabolic on PET relative to hepatic background activity. No generalized pattern of FDG avid metastatic disease  Imaging reviewed. HBP noted on 06/14/2022 reviewed; No evidence of any recurrence on repeat imaging. Evaluated by Dr. Gabriela Atkinson on 06/20/2022  CEA 2.4 on 07/02/2022  Colonoscopy on July 11, 2022 noted colon polyp x1 in the descending colon removed by cold snare polypectomy  Mild diverticulosis of the sigmoid colon  Previous cancer site and spot tattoo near the rectosigmoid junction with no gross recurrence in the lumen of the colon with biopsies of the mucosa taken  Small internal and external hemorrhoids  A. Descending colon polyp polypectomy: Tubular adenoma  B. Previous cancer SPOT biopsy:   Colonic mucosa with no significant pathologic changes  Negative for malignancy  Recommend follow-up colonoscopy in 2 years for surveillance  Operative note reviewed. Pathology reviewed. CEA 2.3 on 08/01/2022  CEA 2.1 on 08/29/2022  CT chest 09/21/2022: No evidence of metastatic disease  CT abdomen/pelvis 09/21/2022: Previously measured areas in the liver are essentially unchanged and indeterminate with no significant hypermetabolism on recent PET-CT. No new hepatic lesion. No evidence for recurrent or metastatic disease within the abdomen or pelvis. CEA 2.1 on 09/26/2022. CEA 1.7 on 10/24/2022.   CEA 2.3 on 11/21/2022  CEA 2.3 on 12/19/2022. Labs reviewed. No evidence of recurrent or metastatic disease. Port flush today 12/20/2022. Follow-up with PCP in regards to arthralgias  Scans ordered    RTC 1 month with prior scans and labs. MSI testing noted no mismatch repair protein loss of expression  NGS testing ([employer])   MS-Stable: No therapies or clinical trials  TMB 3Muts/Mb: No therapies or clinical trials  KRAS [id number]: Therapies with clinical relevance in patient's tumor type: Cetuximab, Panitumumab  Therapies with clinical relevance in other tumor type: None  NRAS wild type:  Therapies with clinical relevance in patient's tumor type: Cetuximab, Panitumumab  Therapies with clinical relevance in other tumor type: None  APC: None None  MARIELA: None None    12/20/2022  Nikki Zimmerman MD  Board Certified Medical Oncologist

## 2023-01-09 DIAGNOSIS — C78.7 RECTAL CANCER METASTASIZED TO LIVER (HCC): Primary | ICD-10-CM

## 2023-01-09 DIAGNOSIS — C20 RECTAL CANCER METASTASIZED TO LIVER (HCC): Primary | ICD-10-CM

## 2023-01-17 ENCOUNTER — APPOINTMENT (OUTPATIENT)
Dept: INFUSION THERAPY | Age: 73
End: 2023-01-17
Payer: MEDICARE

## 2023-01-20 ENCOUNTER — HOSPITAL ENCOUNTER (OUTPATIENT)
Age: 73
Discharge: HOME | End: 2023-01-20
Payer: MEDICARE

## 2023-01-20 ENCOUNTER — HOSPITAL ENCOUNTER (OUTPATIENT)
Dept: CT IMAGING | Age: 73
Discharge: HOME | End: 2023-01-20
Payer: MEDICARE

## 2023-01-20 DIAGNOSIS — C20 RECTAL CANCER (HCC): ICD-10-CM

## 2023-01-20 LAB
ALBUMIN SERPL-MCNC: 4.5 G/DL (ref 3.5–5.2)
ALP BLD-CCNC: 140 U/L (ref 40–129)
ALT SERPL-CCNC: 19 U/L (ref 0–40)
ANION GAP SERPL CALCULATED.3IONS-SCNC: 10 MMOL/L (ref 7–16)
AST SERPL-CCNC: 23 U/L (ref 0–39)
BASOPHILS ABSOLUTE: 0.05 E9/L (ref 0–0.2)
BASOPHILS RELATIVE PERCENT: 0.6 % (ref 0–2)
BILIRUB SERPL-MCNC: 0.6 MG/DL (ref 0–1.2)
BUN BLDV-MCNC: 24 MG/DL (ref 6–23)
CALCIUM SERPL-MCNC: 9.9 MG/DL (ref 8.6–10.2)
CEA: 2.7 NG/ML (ref 0–5.2)
CHLORIDE BLD-SCNC: 105 MMOL/L (ref 98–107)
CO2: 25 MMOL/L (ref 22–29)
CREAT SERPL-MCNC: 1.3 MG/DL (ref 0.7–1.2)
EOSINOPHILS ABSOLUTE: 0.15 E9/L (ref 0.05–0.5)
EOSINOPHILS RELATIVE PERCENT: 1.8 % (ref 0–6)
GFR SERPL CREATININE-BSD FRML MDRD: 58 ML/MIN/1.73
GLUCOSE BLD-MCNC: 110 MG/DL (ref 74–99)
HCT VFR BLD CALC: 40.4 % (ref 37–54)
HEMOGLOBIN: 12.9 G/DL (ref 12.5–16.5)
IMMATURE GRANULOCYTES #: 0.15 E9/L
IMMATURE GRANULOCYTES %: 1.8 % (ref 0–5)
LYMPHOCYTES ABSOLUTE: 0.82 E9/L (ref 1.5–4)
LYMPHOCYTES RELATIVE PERCENT: 9.7 % (ref 20–42)
MCH RBC QN AUTO: 29.3 PG (ref 26–35)
MCHC RBC AUTO-ENTMCNC: 31.9 % (ref 32–34.5)
MCV RBC AUTO: 91.6 FL (ref 80–99.9)
MONOCYTES ABSOLUTE: 0.36 E9/L (ref 0.1–0.95)
MONOCYTES RELATIVE PERCENT: 4.3 % (ref 2–12)
NEUTROPHILS ABSOLUTE: 6.94 E9/L (ref 1.8–7.3)
NEUTROPHILS RELATIVE PERCENT: 81.8 % (ref 43–80)
PDW BLD-RTO: 13.6 FL (ref 11.5–15)
PLATELET # BLD: 155 E9/L (ref 130–450)
PMV BLD AUTO: 9.6 FL (ref 7–12)
POTASSIUM SERPL-SCNC: 4.4 MMOL/L (ref 3.5–5)
RBC # BLD: 4.41 E12/L (ref 3.8–5.8)
SODIUM BLD-SCNC: 140 MMOL/L (ref 132–146)
TOTAL PROTEIN: 7.9 G/DL (ref 6.4–8.3)
WBC # BLD: 8.5 E9/L (ref 4.5–11.5)

## 2023-01-20 PROCEDURE — 71260 CT THORAX DX C+: CPT

## 2023-01-20 PROCEDURE — 6360000004 HC RX CONTRAST MEDICATION: Performed by: RADIOLOGY

## 2023-01-20 PROCEDURE — 82378 CARCINOEMBRYONIC ANTIGEN: CPT

## 2023-01-20 PROCEDURE — 36415 COLL VENOUS BLD VENIPUNCTURE: CPT

## 2023-01-20 PROCEDURE — 80053 COMPREHEN METABOLIC PANEL: CPT

## 2023-01-20 PROCEDURE — 85025 COMPLETE CBC W/AUTO DIFF WBC: CPT

## 2023-01-20 PROCEDURE — 74177 CT ABD & PELVIS W/CONTRAST: CPT

## 2023-01-20 RX ADMIN — IOPAMIDOL 93 ML: 755 INJECTION, SOLUTION INTRAVENOUS at 09:15

## 2023-01-24 ENCOUNTER — HOSPITAL ENCOUNTER (OUTPATIENT)
Dept: INFUSION THERAPY | Age: 73
Discharge: HOME | End: 2023-01-24
Payer: MEDICARE

## 2023-01-24 ENCOUNTER — OFFICE VISIT (OUTPATIENT)
Dept: ONCOLOGY | Age: 73
End: 2023-01-24
Payer: MEDICARE

## 2023-01-24 VITALS
OXYGEN SATURATION: 100 % | WEIGHT: 230.8 LBS | DIASTOLIC BLOOD PRESSURE: 85 MMHG | SYSTOLIC BLOOD PRESSURE: 134 MMHG | TEMPERATURE: 97.9 F | BODY MASS INDEX: 30.59 KG/M2 | HEIGHT: 73 IN | HEART RATE: 66 BPM

## 2023-01-24 DIAGNOSIS — C78.7 RECTAL CANCER METASTASIZED TO LIVER (HCC): Primary | ICD-10-CM

## 2023-01-24 DIAGNOSIS — C20 RECTAL CANCER (HCC): Primary | ICD-10-CM

## 2023-01-24 DIAGNOSIS — C20 RECTAL CANCER METASTASIZED TO LIVER (HCC): Primary | ICD-10-CM

## 2023-01-24 PROCEDURE — 1123F ACP DISCUSS/DSCN MKR DOCD: CPT | Performed by: INTERNAL MEDICINE

## 2023-01-24 PROCEDURE — 96523 IRRIG DRUG DELIVERY DEVICE: CPT

## 2023-01-24 PROCEDURE — 2580000003 HC RX 258: Performed by: INTERNAL MEDICINE

## 2023-01-24 PROCEDURE — 99214 OFFICE O/P EST MOD 30 MIN: CPT

## 2023-01-24 PROCEDURE — 6360000002 HC RX W HCPCS: Performed by: INTERNAL MEDICINE

## 2023-01-24 PROCEDURE — 99214 OFFICE O/P EST MOD 30 MIN: CPT | Performed by: INTERNAL MEDICINE

## 2023-01-24 RX ORDER — SODIUM CHLORIDE 0.9 % (FLUSH) 0.9 %
10 SYRINGE (ML) INJECTION PRN
Status: DISCONTINUED | OUTPATIENT
Start: 2023-01-24 | End: 2023-01-25 | Stop reason: HOSPADM

## 2023-01-24 RX ORDER — SODIUM CHLORIDE 0.9 % (FLUSH) 0.9 %
10 SYRINGE (ML) INJECTION PRN
OUTPATIENT
Start: 2023-01-24

## 2023-01-24 RX ORDER — HEPARIN SODIUM (PORCINE) LOCK FLUSH IV SOLN 100 UNIT/ML 100 UNIT/ML
500 SOLUTION INTRAVENOUS PRN
OUTPATIENT
Start: 2023-01-24

## 2023-01-24 RX ORDER — HEPARIN SODIUM (PORCINE) LOCK FLUSH IV SOLN 100 UNIT/ML 100 UNIT/ML
500 SOLUTION INTRAVENOUS PRN
Status: DISCONTINUED | OUTPATIENT
Start: 2023-01-24 | End: 2023-01-25 | Stop reason: HOSPADM

## 2023-01-24 RX ORDER — WATER 1000 ML/1000ML
2.2 INJECTION, SOLUTION INTRAVENOUS ONCE
OUTPATIENT
Start: 2023-01-24 | End: 2023-01-24

## 2023-01-24 RX ADMIN — SODIUM CHLORIDE, PRESERVATIVE FREE 10 ML: 5 INJECTION INTRAVENOUS at 15:15

## 2023-01-24 RX ADMIN — Medication 500 UNITS: at 15:15

## 2023-01-24 NOTE — PROGRESS NOTES
[place] [id number]            Attending Clinic Note     Reason for Visit: Follow-up on a patient with Metastatic Rectal Cancer. PCP: Lonnie Ba MD     History of Present Illness:  66 y/o  male who was referred to see Dr. Olamide Conner (GI team) for evaluation of bright red blood per rectum, mild anemia and change in bowel habits with diarrhea. CEA 2640 on 10/19/2015. AlcP 299 AST 57 ALT 75 on 10/19/2015. Colonoscopy in 2013 noted no significant polyps, colitis or lesions at that time. Denies any Family History of colorectal cancer or polyps. Colonoscopy on 10/19/2015 revealed:  1. Ascending polyp, 8 mm, hot snare: Tubulovillous adenoma. 2. Transverse polyp, 1 cm, hot snare: Serrated polyp most consistent with sessile serrated adenoma. 3. Five splenic flexure polyps, three - 5 mm, 7 mm, 8 mm, hot snare and biopsy: Four segments of Tubular Adenoma  4. Descending polyp, 5 mm, biopsy: Tubular adenoma. 5. Sigmoid polyp, 4 mm biopsy, Serrated polyp most consistent with sessile serrated adenoma. 6. Two rectal polyps, 4 mm biopsy: Serrated polyp most consistent with hyperplastic polyp. 7. Rectosigmoid colon mass (large mass approximately 65% circumference of the lumen; Very friable, firm and hard): Tubulovillous adenoma with associated focal erosion and fibroplasia. CT scan abdomen/pelvis on 10/26/2015:  1. Small nodules at lung bases likely represent metastatic colon   cancer. 2. Extensive likely metastatic colon cancer throughout the liver. 3. Mild mural thickening and luminal narrowing in the   terminal ileum, otherwise nonspecific. Colonoscopy with snare removal rectal mass was performed by Dr. Kareem Healy. Pathology proved:  Rectal polyp: Invasive adenocarcinoma involving villous adenoma and extending to the cauterized edge of excision. KRAS Mutation: Mutation detected.   BRAF Mutation: Mutation not detected. 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 CEA 3488. Bone scan on 11/10/2015 noted no metastatic disease. CT chest on 11/10/2015 revealed Multiple pulmonary nodules in the upper and lower lobes consistent with metastatic disease;   Hepatic metastasis also visualized. 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 Mediport was placed. Cycle # 1 of FOLFOX + Avastin was on 11/23/2015. CEA was 4555 on 11/23/2015. Cycle # 2 of FOLFOX + Avastin was on 12/08/2015. CEA was 3160 on 12/08/2015. Cycle # 3 of FOLFOX + Avastin was on 12/22/2015. CEA was 2516 on 12/21/2015. Cycle # 4 of FOLFOX + Avastin was on 01/05/2016. CEA was 2098 on 01/05/2015. Cycle # 5 of FOLFOX + Avastin was on 01/19/2016. CEA was 1511 on 01/05/2015.     -Bone scan on 01/26/2016 noted no metastatic disease. CT chest on 01/26/2016 revealed Significant response to treatment with no visible residual nodules. CT scan abdomen/pelvis on 01/26/2016 noted Interval decreased size of multiple masses in the liver compatible with treatment response. Continue another 2 months of FOLFOX + Avastin and repeat scans. Cycle # 6 of FOLFOX + Avastin was on 02/02/2016.  on 02/02/2016. Cycle # 7 of FOLFOX + Avastin was on 02/16/2016.  on 02/16/2016. Cycle # 8 of FOLFOX + Avastin was on 03/01/2016.  on 03/01/2016. Cycle # 9 of FOLFOX + Avastin was on 03/15/2016.  on 03/15/2016. Cycle # 10 of FOLFOX + Avastin was on 03/29/2016. .4 on 03/29/2016. Cycle # 11 of FOLFOX + Avastin was on 04/12/2016. .4 on 04/12/2016. Re-staging scans on 04/19/2016: CT Chest: clear lungs; no evidence of recurring pulmonary nodule; CT Abdomen/Pelvis: Further interval decrease in size of the multiple metastatic hepatic lesions, the largest lesion now measures 3.9 x 3.5 cm and previously measured 4.5 cm in maximum diameter.  No mesenteric lymphadenopathy is identified; Bone Scan: No evidence of osseous metastasis. Continue same regimen and re-stage in 2-3 months. Cycle # 12 FOLFOX + Avastin was on 04/26/2016. .5 on 04/26/2016. Cycle # 13 FOLFOX + Avastin was on 05/10/2016. .3 on 05/10/2016. Cycle # 14 FOLFOX+AVASTIN was on 05/24/2016. .5 on 05/24/2016. Cycle # 15 FOLFOX + Avastin was on 06/07/2016. CEA 90.5 on 06/07/2016. Admitted to [facility] 06/13/2016-06/16/2016 for abdominal pain: EGD noted 1.5 cm clean based duodenal bulb ulceration s/p epinephrine and bicap per Dr. Lauran Pallas. No active bleeding. A. Stomach, biopsy: Mild chronic gastritis, immunostain negative for Helicobacter  B. Esophagus, biopsy: Gastric glandular mucosa with prominent intestinal metaplasia (Mad[place]'s epithelium), negative for epithelial dysplasia, esophageal squamous mucosa not identified  Cycle # 16 FOLFOX (discontinued avastin (bevacizumab) given association of peptic ulcer disease and known association of GI perforation) was on 06/21/2016. Cycle # 17 FOLFOX was on 07/05/2016. CEA 52.6 on 07/19/2016. Cycle # 17 FOLFOX was on 07/05/2016. CEA 52.6 on 07/05/2016. CEA 47.6 on 07/19/2016. Re-staging scans 07/19/2106: CT Chest negative for metastatic disease. CT Abdomen/Pelvis: Stable hepatic lesions; Question new mural thickening in the cecum. Bone Scan: New Let hip lesion suspicious for bone metastasis. Increased CEA; new mural thickening in the cecum and new left hip lesion suspicious for bone metastasis are consistent with disease progression; He derived maximum benefit from FOLFOX/AVASTIN. D/C FOLFOX/AVASTIN. We recommended FOLFIRI second line therapy. Cycle # 1 FOLFIRI was on 08/02/2016. CEA 40.8 on 08/02/2016. Xgeva q4 weeks started on 08/02/2016. Cycle # 2 FOLFIRI was on 08/16/2016. CEA 31.7 on 08/16/2016. Cycle # 3 FOLFIRI (added Avastin) was on 08/30/2016 given that ulcers healed on EGD 08/15/2016 by Dr. Madina Gresham; Protonix bid since avastin re-started.    Colonoscopy on 08/29/2016 (to look at the cecal area) unremarkable. CEA 26.7 on 08/30/2016. Cycle # 4 FOLFIRI/Avastin was on 09/13/2016. CEA 23.4 on 09/13/2016. Cycle # 5 FOLFIRI/Avastin was on 09/27/2016. CEA 22.3 on 09/27/2016. Cycle # 6 FOLFIRI/Avastin was on 10/11/2016. CEA 18.4 on 10/11/2016. Bone scan 10/21/2016 stable bone metastasis; ? New lesion anterior left sixth rib likely interval healing fracture. CT abdomen/pelvis revealed stable hepatic metastasis. CT chest negative for metastatic disease. Continue FOLFIRI/Avastin and repeat scans in 3 months. Cycle # 7 FOLFIRI/Avastin was on 10/25/2016. CEA 16.1  Cycle # 8 FOLFIRI/Avastin was on 11/08/2016. CEA 15.7  Cycle # 9 FOLFIRI/Avastin was on 11/29/2016. CEA 13.6 on 11/29/2016. Cycle # 10 FOLFIRI/Avastin was on 12/13/2016. CEA 10.6 on 12/13/2016. Cycle # 11 FOLFIRI/Avastin was on 12/27/2016. CEA 11.1 on 12/27/2016. Cycle # 12 FOLFIRI/Avastin was on 01/10/2017. CEA 9.7 on 01/10/2017. CT chest 01/23/2017 No new pulmonary nodule or lymphadenopathy. Patchy groundglass opacities within the left lower lobe, suggestive of infectious or inflammatory etiology. Followup CT chest in 3 months. Slight increased linear sclerosis along the left anterior sixth rib corresponding to an area of increased uptake on the prior bone scan from 10/21/2016, favored to be related to interval healing changes of a nondisplaced fracture. CT abdomen/pelvis on 01/23/2017 Redemonstration of multiple hepatic metastases, which are similar compared to the prior CT from 10/21/2016. Redemonstration of area of increased sclerosis along the right acetabulum suspicious for metastatic disease. Bone scan on 01/23/2017 Stable subtle uptake within the left proximal diaphysis, again suggestive of metastatic disease  Decreased subtle uptake within the medial right acetabulum.    Decreased uptake within the left anterior sixth rib corresponding to linear sclerosis on the recent CT, favored to be related to an joint rib fracture. Continue FOLFIRI + Avastin and repeat scans in 3 months. Cycle # 13 FOLFIRI + Avastin was on 01/24/2017. Cycle # 14 FOLFIRI + Avastin was on 02/07/2017. Cycle # 15 FOLFIRI + Avastin was on 02/21/2017. Cycle # 16 FOLFIRI + Avastin was on 03/07/2017. Cycle # 17 FOLFIRI + Avastin was on 03/21/2017. Cycle # 18 FOLFIRI + Avastin was on 04/04/2017. CT chest 04/17/2017 negative for metastatic disease. CT abdomen/pelvis 04/17/2017 Stable hypodense metastatic lesions within the liver. Stable area of increased sclerosis along the right acetabulum  Bone scan 04/17/2017 Stable subtle uptake within the left proximal femoral diaphysis; No definite abnormal uptake in the region of a sclerotic lesion along the posterior column of the right acetabulum noted on CT. Stable slight uptake within the left anterior sixth rib corresponding to linear sclerosis on the recent CT, favored to be related to a fracture. Continue FOLFIRI + Avastin and repeat scans in 3 months. Cycle # 19 FOLFIRI + Avastin was on 04/18/2017. Cycle # 20 FOLFIRI + Avastin was on 05/02/2017. Cycle # 21 FOLFIRI + Avastin was on 05/16/2017. Cycle # 22 FOLFIRI + Avastin was on 05/30/2017. Cycle # 23 FOLFIRI + Avastin was on 06/13/2017. Cycle # 24 FOLFIRI + Avastin was on 06/27/2017. Cycle # 25 FOLFIRI + Avastin was on 07/11/2017. Cycle # 26 FOLFIRI + Avastin was on 07/25/2017. Cycle # 27 FOLFIRI + Avastin was on 08/08/2017. Cycle # 28 FOLFIRI + Avastin was on 08/22/2017. Cycle # 29 FOLFIRI + Avastin was on 09/05/2017. Cycle # 30 FOLFIRI + Avastin was on 09/19/2017. Bone scan 09/26/2017 stable. CT abdomen/pelvis on 09/26/2017 Stable hypodense mass in the liver. Stable sclerotic lesion in the acetabulum. CT chest 09/26/2017 negative for metastatic disease. Cycle # 31 FOLFIRI + Avastin was on 10/03/2017. CEA 7.4 on 10/03/2017. Cycle # 32 FOLFIRI + Avastin was on 10/17/2017.  CEA 6.0 on 10/17/2017. Cycle # 33 FOLFIRI + Avastin was on 10/31/2017. CEA 6.8 on 10/31/2017. Cycle # 34 FOLFIRI + Avastin was on 11/14/2017. CEA 7.2 on 11/14/2017. Cycle # 35 FOLFIRI + Avastin was on 11/28/2017. CEA 5.1 on 11/28/2017. Cycle # 36 FOLFIRI + Avastin was on 12/12/2017. CEA 6.1 on 12/12/2017. Bone scan 12/22/2017 noted no evidence of metastatic disease to the axial or appendicular skeleton. CT chest 12/22/2017 noted no evidence of metastatic disease. CT abdomen/pelvis 12/22/2017 noted stable hypodense lesions in the liver. Continue FOLFIRI + Avastin and repeat scans in 3 months. Cycle # 37 FOLFIRI + Avastin was on 12/27/2018. Cycle # 38 FOLFIRI + Avastin was on 01/09/2018. Cycle # 39 FOLFIRI + Avastin was on 01/23/2018. Cycle # 40 FOLFIRI + Avastin was on 02/06/2018. Cycle # 41 FOLFIRI + Avastin was on 02/20/2018. Cycle # 42 FOLFIRI + Avastin was on 03/06/2018. Cycle # 43 FOLFIRI + Avastin was on 03/20/2018. Bone scan on 03/26/2018 negative for metastatic disease. CT chest 03/26/2018 noted ? new 7 mm nodule in LUCY. CT abdomen/pelvis 03/26/2018 noted stable hypodense lesions in the liver. ? New small ascites in the abdomen. Patient wants to continue to monitor the lung finding and repeat scans in 2 months instead of changing the current regimen into oral Lonsurf. Cycle # 44 FOLFIRI + Avastin was on 04/03/2018. CEA 6.3 on 04/03/2018. Cycle # 45 FOLFIRI + Avastin was on 04/24/2018. CEA 5.3 on 04/24/2018. Cycle # 46 FOLFIRI + Avastin was on 05/08/2018. CEA 5.4 on 05/08/2018. Cycle # 47 FOLFIRI + Avastin was on 05/22/2018. CEA 6.1 on 05/22/2018. CT chest 05/31/2018 noted stable nodule in LUCY. No evidence of worsening malignancy. CT abdomen/pelvis on 05/31/2018 noted stable liver metastasis. No evidence of worsening malignancy. Continue FOLFIRI + Avastin and repeat scans in 3 months. Cycle # 48 FOLFIRI + Avastin was on 06/06/2018. Cycle # 49 FOLFIRI + Avastin was on 06/19/2018. Cycle # 50 FOLFIRI + Avastin was on 07/03/2018. Cycle # 51 FOLFIRI + Avastin was on 07/24/2018. Cycle # 52 FOLFIRI + Avastin was on 08/14/2018. Cycle # 53 FOLFIRI + Avastin was on 08/28/2018. CT chest 09/06/2018 noted interval decrease in size of LUCY measuring up to 4 mm, suggestive of treatment response. No mediastinal or hilar LN  CT abdomen/pelvis 09/06/2018 Slight interval increase in size of a previously noted metastatic lesion within the right hepatic lobe. This may be related to differences in phase of enhancement compared to the most recent study from 5/31/2018, the lesion remains decreased in size compared to the more previous studies. No abdominal, retroperitoneal, or pelvic lymphadenopathy. Continue FOLFIRI + Avastin and repeat scans in 2 months. Cycle # 54 FOLFIRI + Avastin was on 09/11/2018. Cycle # 55 FOLFIRI + Avastin was on 09/25/2018. Cycle # 56 FOLFIRI + Avastin was on 10/09/2018. Cycle # 57 FOLFIRI + Avastin was on 10/23/2018. CT chest 11/02/2018 stable 3 mm nodule within LUCY. No new right and left lung nodule. No mediastinal or osseous lesion. CT abdomen/pelvis 11/02/2018 stable metastatic disease to liver. No new metastatic disease identified. No mesenteric or retroperitoneal LN. No gross colonic lesion identified. Continue FOLFIRI + Avastin and repeat scans in 3 months. Cycle # 58 FOLFIRI + Avastin was on 11/06/2018. CEA 7.6 on 11/05/2018. Cycle # 59 FOLFIRI + Avastin was on 11/27/2018. CEA 6.9 on 11/26/2018. Cycle # 60 FOLFIRI + Avastin was on 12/11/2018. CEA 6.7 on 12/11/2018. Cycle # 61 FOLFIRI + Avastin was on 01/08/2019. CEA 5.6 on 01/07/2019. Cycle # 62 FOLFIRI + Avastin was on 01/29/2019. CEA 4.6 on 01/28/2019. Cycle # 63 FOLFIRI + Avastin was on 02/12/2019. CEA 4.3 on 02/11/2019. CT chest 02/21/2019 no evidence of metastatic disease. CT abdomen/pelvis 02/21/2019 stable hypodense liver lesions. No evidence of worsening metastatic disease.  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 Ordered MRI thoracic spine and referred to neurosurgery team.    Cycle # 64 FOLFIRI + Avastin was on 02/26/2019. CEA 4.7 on 02/25/2019. Cycle # 65 FOLFIRI + Avastin was on 03/12/2019. CEA 4.0 on 03/11/2019. Cycle # 66 FOLFIRI + Avastin was on 03/26/2019. CEA 4.7 on 03/25/2019. Cycle # 67 FOLFIRI + Avastin was on 04/09/2019. CEA 5.6 on 04/08/2019. Cycle # 68 FOLFIRI + Avastin was on 04/30/2019. CEA 4.9 on 04/29/2019. Cycle # 69 FOLFIRI + Avastin was on 05/14/2019. CEA 5.3 on 05/14/2019. CT chest 05/23/2019 stable small lung nodule with no evidence of metastatic disease. CT abdomen/pelvis 05/23/2019 Decrease conspicuity of the liver lesions. No new lesions seen. Stable splenomegaly. Continue FOLFIRI + Avastin and repeat scans in 3 months. Cycle # 70 FOLFIRI + Avastin was on 06/04/2019. CEA 4.8 on 06/03/2019. Cycle # 71 FOLFIRI + Avastin was on 06/18/2019. CEA 4.6 on 06/17/2019. Cycle # 72 FOLFIRI + Avastin was on 07/09/2019. CEA 4.3 on 07/08/2019. Cycle # 73 FOLFIRI + Avastin was on 07/30/2019. CEA 5.0 on 07/29/2019. Cycle # 74 FOLFIRI + Avastin was on 08/13/2019. CEA 5.0 on 08/12/2019. CT chest 08/20/2019 negative  CT abdomen/pelvis 08/20/2019 Previous identified hepatic lesions are not visualized on the current exam.  Continue FOLFIRI + Avastin and repeat scans in 3 months. Cycle # 75 FOLFIRI + Avastin was on 08/27/2019. CEA 5.0 on 08/26/2019. Cycle # 76 FOLFIRI + Avastin was on 09/17/2019. CEA 5.5 on 09/16/2019. Cycle # 77 FOLFIRI + Avastin was on 10/15/2019. CEA 4.6 on 10/15/2019. Cycle # 78 FOLFIRI + Avastin was on 10/29/2019. CEA 4.1 on 10/28/2019. Cycle # 79 FOLFIRI + Avastin was on 11/12/2019. CEA 4.3 on 11/12/2019. Cycle # 80 FOLFIRI + Avastin was on 12/10/2019. CEA 4.0 on 12/09/2019.   CT chest 12/19/2019 Stable 3 mm nodule within the left upper lobe, similar to the previous studies dating back to 11/2/2018, however decreased in size compared to the CT from 3/26/2018. No thoracic LN. CT abdomen/pelvis 12/19/2019 Previously described small hypodense lesions are more conspicuous on the current study compared to the recent study throughout the liver from 8/20/2019, however similar to the prior study from 2/21/2019. Findings may be related to differences in contrast opacification. No abdominal or pelvic lymphadenopathy. Continue FOLFIRI + Avastin and repeat scans in 2 months to f/u on liver lesions. Cycle # 81 FOLFIRI + Avastin was on 01/07/2020. CEA 3.8 on 01/06/2020. Cycle # 82 FOLFIRI + Avastin was on 01/21/2020. CEA 3.7 on 01/20/2020. Cycle # 83 FOLFIRI + Avastin was on 02/04/2020. CEA 4.1 on 02/03/2020. Cycle # 84 FOLFIRI + Avastin was on 02/18/2020. CEA 4.6 on 02/17/2020. EGD by Dr. Eladia Lafleur 03/02/2020 showing no masses or lesions  Cycle # 85 FOLFIRI + Avastin was on 03/03/2020. CEA 7.4 on 03/02/2020. Cycle # 86 FOLFIRI + Avastin was on 03/17/2020. CEA 4.5 on 03/16/2020. Colonoscopy on 03/23/2020 by Dr. Eladia Lafleur unremarkable (records requested). Cycle # 87 FOLFIRI + Avastin was on 03/31/2020. CEA 4.1 on 03/30/2020. Cycle # 88 FOLFIRI + Avastin was on 04/21/2020. CEA 6.4 on 04/20/2020. Cycle # 89 FOLFIRI + Avastin was on 05/05/2020. CEA 5.8 on 05/04/2020. Cycle # 90 FOLFIRI + Avastin was on 06/02/2020. CEA 5.5 on 06/01/2020. Cycle # 91 FOLFIRI + Avastin was on 06/16/2020. CEA 5.6 on 06/15/2020. CT chest 06/23/2020 noted no metastatic disease in the chest.   CT abdomen/pelvis 06/23/2020 Stable small liver lesions measuring up to 5 mm since 2019.   22 mm round mass in segment 8 of the liver with central high density stable from December 2019), previously measuring up to 34 mm in 2017. No additional metastatic disease in the abdomen or pelvis. Small amount of ascites. Overall stable disease. Continue FOLFIRI + Avastin and repeat scans in 2-3 months. Cycle # 92 FOLFIRI + Avastin was on 07/07/2020.  CEA 5.7 on 07/06/2020. Cycle # 93 FOLFIRI + Avastin was on 07/21/2020. CEA 5.9 on 07/20/2020. Cycle # 94 FOLFIRI + Avastin was on 08/04/2020. CEA 5.5 on 08/04/2020. Cycle # 95 FOLFIRI + Avastin was on 08/25/2020. CEA 6.1 on 08/24/2020. CT chest 9/2/2020 stable unremarkable enhanced CT of the thorax. There is no metastatic disease to the lungs. CT abdomen pelvis on 9/2/2020 stable 2.2 cm low attenuated lesion within the central aspect of the right hepatic lobe favored to represent treated metastatic disease. No new hepatic lesion is identified. Stable splenomegaly  There is no appreciable colonic lesion or stricture  Pericholecystic fluid of uncertain etiology. General surgery team consulted. Laparoscopic cholecystectomy with normal cholangiogram 10/27/20  Gallbladder: Chronic cholecystitis and cholelithiasis. Unremarkable regional lymph node. 11/13/2020; Seen by Dr. Clara Mclaughlin from General surgery team; cleared to re-start chemotherapy. Cycle # 96 FOLFIRI + Avastin was on 11/17/2020. CEA 3.6 on 11/16/2020. Cycle # 97 FOLFIRI + Avastin was on 12/01/2020. CEA 3.5 on 11/30/2020. Cycle # 98 FOLFIRI + Avastin was on 12/15/2020. CEA 4.3 on 12/15/2020. Cycle # 99 FOLFIRI + Avastin was on 01/05/2021. CEA 4.7 on 01/04/2021. CT chest 01/13/2021 negative for metastatic disease. CT abdomen/pelvis 01/13/2021 Unchanged central hepatic lesion. No specific signs of abdominopelvic metastatic disease. Continue FOLFIRI + Avastin and repeat scans in 3 months. Cycle # 100 FOLFIRI + Avastin was on 01/19/2021. CEA 4.7 on 01/18/2021. Cycle # 101 FOLFIRI + Avastin was on 02/02/2021. CEA 5.2 on 02/01/2021. Cycle # 102 FOLFIRI + Avastin was on 02/16/2021. CEA 5.6 on 02/15/2021. Cycle # 103 FOLFIRI + Avastin was on 03/02/2021. Cycle # 104 FOLFIRI (20% dose reduced due to significant toxicity) + Avastin was on 03/16/2021. Cycle # 105 FOLFIRI (same dose as cycle # 104) + Avastin was on 03/30/2021.  CEA 6.5 on 03/29/2021. Cycle # 106 FOLFIRI (same dose as cycle # 104) + Avastin was on 04/13/2021. CEA 6.0 on 04/12/2021  CT chest 04/20/2021 no evidence of metastatic disease. CT abdomen/pelvis 04/20/2021 No evidence of progressive metastatic disease. Stable 2 cm lesion in the right lobe of the liver, likely representing treated metastatic disease. Imaging reviewed. Continue FOLFIRI + Avastin and repeat scans in 3 months  Cycle # 107 FOLFIRI + Avastin was on 04/27/2021  Cycle # 108 FOLFIRI + Avastin was on 05/11/2021. Cycle # 109 FOLFIRI (held Avastin due to upcoming surgery) on 05/25/2021. CEA 6.3 on 5/24/21  Cycle # 110 FOLFIRI (held Avastin due to upcoming surgery) on 06/08/2021. CEA 6.3 on 6/07/21. Right inguinal/scrotol hernia repair on 07/13/2021 with Dr Keshav Sexton with folely removal on Friday 07/23/2021. He developed fever on 07/24/2021 and was brought to [address] [place], [place] [address] ER via EMS; Sepsis secondary to urinary tract infection   Completed Abx  CEA 4.1 on 08/30/2021  Cycle # 111 FOLFIRI was on 08/31/2021. HBP team for evaluation of liver lesions; His lesion does appear to be a hemangioma as his prior metastatic deposits in his liver were hypo attenuating compared to this CT which is hyper attenuating. compared to his prior CT scans he has disappearing liver metastasis. MRI liver on 09/17/2021 No suspicious liver lesion identified. Colonoscopy GI Dr. Ezequiel Nagel on 09/20/2021 unremarkable  CEA 3.3 on 09/27/2021. CEA 3.2 on 10/25/2021. CEA 2.7 on 11/22/2021. CEA 2.5 on 12/27/2021. CEA 2.4 on 01/24/2022. CEA 2.3 on 02/21/2022. CEA 2.4 on 03/21/2022. CEA 2.2 on 04/18/2022  CEA 2.2 on 05/16/2022  CEA 2.3 on 06/06/2022  HBP note 06/14/2022 reviewed. No evidence of recurrence on repeat imaging.   CEA 2.4 on 07/02/2022  CEA 2.3 on 08/01/2022  CEA 2.1 on 08/29/2022    CT chest 09/21/2022: No evidence of metastatic disease  CT abdomen/pelvis 09/21/2022: Previously measured areas in the liver are essentially unchanged and indeterminate with no significant hypermetabolism on recent PET-CT. No new hepatic lesion. No evidence for recurrent or metastatic disease within the abdomen or pelvis. CEA 2.1 on 09/26/2022. CEA 1.7 on 10/24/2022. CEA 2.3 on 11/21/2022. CEA 2.3 on 12/19/2022. CT chest/abdomen/pelvis 01/20/2023: Stable examination with no evidence of metastatic disease or recurrence. There are low-attenuation areas seen within the superior aspect of the liver are unchanged. CEA 2.7 on 01/20/2023    Today 01/24/2023: No fever chills. Fair appetite and energy level. No chest pain or shortness of breath. No nausea vomiting. Intermittent arthralgias    Review of Systems;  CONSTITUTIONAL: No fever, chills. Fair appetite and energy level  ENMT: Eyes: No diplopia; Nose: No epistaxis. Mouth:No lesions  RESPIRATORY: No hemoptysis, shortness of breath. CARDIOVASCULAR: No chest pain, palpitations. GASTROINTESTINAL:No N/V, abdominal pain. GENITOURINARY: No dysuria, urinary frequency, hematuria. MSK: Intermittent Arthralgias  NEURO: No syncope, presyncope, headache. Remainder ROS: Negative. Past Medical History:   Past Medical History               Diagnosis Date    Arthritis      Cancer ([facility] [address]         colon    Depression      Hyperlipidemia      Hypertension           Medications:  Reviewed and reconciled. Allergies: Allergies   Allergen Reactions    Neosporin [Neomycin-Polymyxin-Gramicidin] Rash    Tape Pablo Blackbird Tape] Rash      Physical Exam:  /85   Pulse 66   Temp 97.9 °F (36.6 °C)   Ht 6' 1\" (1.854 m)   Wt 230 lb 12.8 oz (104.7 kg)   SpO2 100%   BMI 30.45 kg/m²   GENERAL: Alert, oriented x 3, not in distress  HEENT: PERRLA, EOMI. No oral lesions   LUNGS: CTA Evaristo  CVS:RRR  EXTREMITIES: Without clubbing, cyanosis  NEUROLOGIC: No focal deficits.    ECOG PS 1    Diagnostics:  Lab Results   Component Value Date    WBC 8.5 01/20/2023    HGB 12.9 01/20/2023    HCT 40.4 01/20/2023    MCV 91.6 01/20/2023     01/20/2023     Lab Results   Component Value Date     01/20/2023    K 4.4 01/20/2023     01/20/2023    CO2 25 01/20/2023    BUN 24 (H) 01/20/2023    CREATININE 1.3 (H) 01/20/2023    GLUCOSE 110 (H) 01/20/2023    CALCIUM 9.9 01/20/2023    PROT 7.9 01/20/2023    LABALBU 4.5 01/20/2023    BILITOT 0.6 01/20/2023    ALKPHOS 140 (H) 01/20/2023    AST 23 01/20/2023    ALT 19 01/20/2023    LABGLOM 58 01/20/2023    GFRAA >60 09/26/2022     Lab Results   Component Value Date    CEA 2.7 01/20/2023     Impression/Plan:  68 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 KRAS Mutation: Mutation detected. BRAF Mutation: Mutation not detected. 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 CT scan abdomen/pelvis on 10/26/2015 revealed small nodules at the lung bases, extensive liver lesions consistent with metastatic rectosigmoid cancer. CEA 3488 on 10/30/2015. Bone scan on 11/10/2015 noted no metastatic disease. CT chest on 11/10/2015 revealed Multiple pulmonary nodules in the upper and lower lobes consistent with metastatic disease; Hepatic metastasis also visualized. 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 Mediport was placed. Cycle # 1 of FOLFOX + Avastin was on 11/23/2015. CEA was 4555 on 11/23/2015. Cycle # 2 of FOLFOX + Avastin was on 12/08/2015. CEA was 3160 on 12/08/2015. Cycle # 3 of FOLFOX + Avastin was on 12/22/2015. CEA was 2516 on 12/21/2015. Cycle # 4 of FOLFOX + Avastin was on 01/05/2016. CEA was 2098 on 01/05/2015. Cycle # 5 of FOLFOX + Avastin was on 01/19/2016. CEA was 1511 on 01/05/2015.     -Bone scan on 01/26/2016 noted no metastatic disease. CT chest on 01/26/2016 revealed Significant response to treatment with no visible residual nodules.   CT scan abdomen/pelvis on 01/26/2016 noted Interval decreased size of multiple masses in the liver compatible with treatment response. Continue another 2 months of FOLFOX + Avastin and repeat scans. Cycle # 6 of FOLFOX + Avastin was on 02/02/2016.  on 02/02/2016. Cycle # 7 of FOLFOX + Avastin was on 02/16/2016.  on 02/16/2016. Cycle # 8 of FOLFOX + Avastin was on 03/01/2016.  on 03/01/2016. Cycle # 9 of FOLFOX + Avastin was on 03/15/2016.  on 03/15/2016. Cycle # 10 of FOLFOX + Avastin was on 03/29/2016. .4 on 03/29/2016. Cycle # 11 of FOLFOX + Avastin was on 04/12/2016. .4 on 04/12/2016. Re-staging scans on 04/19/2016: CT Chest: clear lungs; no evidence of recurring pulmonary nodule; CT Abdomen/Pelvis: Further interval decrease in size of the multiple metastatic hepatic lesions, the largest lesion now measures 3.9 x 3.5 cm and previously measured 4.5 cm in maximum diameter. No mesenteric lymphadenopathy is identified; Bone Scan: No evidence of osseous metastasis. Continue same regimen and re-stage in 2-3 months. Cycle # 12 FOLFOX + Avastin was on 04/26/2016. .5 on 04/26/2016. Cycle # 13 FOLFOX + Avastin was on 05/10/2016. .3 on 05/10/2016. Cycle # 14 FOLFOX+AVASTIN was on 05/24/2016. .5 on 05/24/2016. Cycle # 15 FOLFOX + Avastin was on 06/07/2016. CEA 90.5 on 06/07/2016. Admitted to [facility] 06/13/2016-06/16/2016 for abdominal pain: EGD noted 1.5 cm clean based duodenal bulb ulceration s/p epinephrine and bicap per Dr. Beverley Blanton. No active bleeding. A. Stomach, biopsy: Mild chronic gastritis, immunostain negative for Helicobacter  B. Esophagus, biopsy: Gastric glandular mucosa with prominent ntestinal metaplasia (Mad[place]'s epithelium), negative for epithelial dysplasia, esophageal squamous mucosa not identified     Cycle # 16 FOLFOX (discontinued avastin (bevacizumab) given association of peptic ulcer disease and known association of GI perforation.) was on 06/21/2016. CEA 47 on 06/21/2016. Cycle # 17 FOLFOX was on 07/05/2016. CEA 52.6 on 07/05/2016.  CEA 47.6 on 07/19/2016. Re-staging scans 07/19/2106: CT Chest negative for metastatic disease. CT Abdomen/Pelvis: Stable hepatic lesions; Question new mural thickening in the cecum. Bone Scan: New Let hip lesion suspicious for bone metastasis. Increased CEA; new mural thickening in the cecum and new left hip lesion suspicious for bone metastasis are consistent with disease progression; He derived maximum benefit from FOLFOX/AVASTIN. D/C FOLFOX/AVASTIN. We recommended FOLFIRI second line therapy. Cycle # 1 FOLFIRI was on 08/02/2016. CEA 40.8 on 08/02/2016. Xgeva q4 weeks started on 08/02/2016. Cycle # 2 FOLFIRI was on 08/16/2016. CEA 31.7 on 08/16/2016. Cycle # 3 FOLFIRI (added Avastin) was on 08/30/2016 given that ulcers healed on EGD 08/15/2016 by Dr. Binnie Fothergill; Protonix bid since avastin re-started. Colonoscopy on 08/29/2016 (to look at the cecal area) unremarkable. CEA 26.7 on 08/30/2016. Cycle # 4 FOLFIRI/Avastin was on 09/13/2016. CEA 23.4 on 09/13/2016. Cycle # 5 FOLFIRI/Avastin was on 09/27/2016. CEA 22.3 on 09/27/2016. Cycle # 6 FOLFIRI/Avastin was on 10/11/2016. CEA 18.4 on 10/11/2016. Bone scan 10/21/2016 stable bone metastasis; ? New lesion anterior left sixth rib likely interval healing fracture. CT abdomen/pelvis revealed stable hepatic metastasis. CT chest negative for metastatic disease. Continue FOLFIRI/Avastin and repeat scans in 3 months. Cycle # 7 FOLFIRI/Avastin was on 10/25/2016. CEA 16.1 on 10/25/2016. Cycle # 8 FOLFIRI/Avastin was on 11/08/2016. CEA 15.7 on 11/08/2016. Cycle # 9 FOLFIRI/Avastin was on 11/29/2016. CEA 13.6 on 11/29/2016. Cycle # 10 FOLFIRI/Avastin was on 12/13/2016. CEA 10.6 on 12/13/2016. Cycle # 11 FOLFIRI/Avastin was on 12/27/2016. CEA 11.1 on 12/27/2016. Cycle # 12 FOLFIRI/Avastin was on 01/10/2017. CEA 9.7 on 01/10/2017. CT chest 01/23/2017 No new pulmonary nodule or lymphadenopathy.  Patchy groundglass opacities within the left lower lobe, suggestive of infectious or inflammatory etiology. Followup CT chest in 3 months. Slight increased linear sclerosis along the left anterior sixth rib corresponding to an area of increased uptake on the prior bone scan from 10/21/2016, favored to be related to interval healing changes of a nondisplaced fracture. CT abdomen/pelvis on 01/23/2017 Redemonstration of multiple hepatic metastases, which are similar compared to the prior CT from 10/21/2016. Redemonstration of area of increased sclerosis along the right acetabulum suspicious for metastatic disease. Bone scan on 01/23/2017 Stable subtle uptake within the left proximal diaphysis, again suggestive of metastatic disease  Decreased subtle uptake within the medial right acetabulum. Decreased uptake within the left anterior sixth rib corresponding to linear sclerosis on the recent CT, favored to be related to an joint rib fracture. Continue FOLFIRI + Avastin and repeat scans in 3 months. Cycle # 13 FOLFIRI + Avastin was on 01/24/2017. Cycle # 14 FOLFIRI + Avastin was on 02/07/2017. Cycle # 15 FOLFIRI + Avastin was on 02/21/2017. Cycle # 16 FOLFIRI + Avastin was on 03/07/2017. Cycle # 17 FOLFIRI + Avastin was on 03/21/2017. Cycle # 18 FOLFIRI + Avastin was on 04/04/2017. CT chest 04/17/2017 negative for metastatic disease. CT abdomen/pelvis 04/17/2017 Stable hypodense metastatic lesions within the liver. Stable area of increased sclerosis along the right acetabulum  Bone scan 04/17/2017 Stable subtle uptake within the left proximal femoral diaphysis; No definite abnormal uptake in the region of a sclerotic lesion along the posterior column of the right acetabulum noted on CT. Stable slight uptake within the left anterior sixth rib corresponding to linear sclerosis on the recent CT, favored to be related to a fracture. Continue FOLFIRI + Avastin and repeat scans in 3 months. Cycle # 19 FOLFIRI + Avastin was on 04/18/2017.   CEA 7.5 on 04/18/2017. Cycle # 20 FOLFIRI + Avastin was on 05/02/2017. CEA 7.7 on 05/02/2017. Cycle # 21 FOLFIRI + Avastin was on 05/16/2017. CEA 7.1 on 05/16/2017. Cycle # 22 FOLFIRI + Avastin was on 05/30/2017. CEA 8.2 on 05/30/2017. Cycle # 23 FOLFIRI + Avastin was on 06/13/2017. CEA 7.7 on 06/13/2017. Cycle # 24 FOLFIRI + Avastin was on 06/27/2017. CEA 6.6 on 06/27/2017. Re-staging scans 07/05/2017:  Bone scan stable proximal left femoral diaphysis, right acetabulum, and left anterior sixth rib lesions;  CT abdomen/pelvis stable hypodense metastatic lesions within the liver; CT chest without convincing evidence of metastatic disease. Cycle # 25 FOLFIRI + Avastin was on 07/11/2017. CEA 6.3 on 07/11/2017. Cycle # 26 FOLFIRI + Avastin was on 07/25/2017. CEA 7.3 on 07/25/2017. Cycle # 27 FOLFIRI + Avastin was on 08/08/2017. CEA 6.5 on 08/08/2017. Cycle # 28 FOLFIRI + Avastin was on 08/22/2017. CEA 5.9 on 08/22/2017. Cycle # 29 FOLFIRI + Avastin was on 09/05/2017. CEA 6.3 on 09/05/2017. Cycle # 30 FOLFIRI + Avastin was on 09/19/2017. CEA 6.3 on 09/19/2017. Bone scan 09/26/2017 stable. CT abdomen/pelvis on 09/26/2017 Stable hypodense mass in the liver. Stable sclerotic lesion in the acetabulum. CT chest 09/26/2017 negative for metastatic disease. Cycle # 31 FOLFIRI + Avastin was on 10/03/2017. CEA 7.4 on 10/03/2017. Cycle # 32 FOLFIRI + Avastin was on 10/17/2017. CEA 6.0 on 10/17/2017. Cycle # 33 FOLFIRI + Avastin was on 10/31/2017. CEA 6.8 on 10/31/2017. Cycle # 34 FOLFIRI + Avastin was on 11/14/2017. CEA 7.2 on 11/14/2017. Cycle # 35 FOLFIRI + Avastin was on 11/28/2017. CEA 5.1 on 11/28/2017. Cycle # 36 FOLFIRI + Avastin was on 12/12/2017. CEA 6.1 on 12/12/2017. Bone scan 12/22/2017 noted no evidence of metastatic disease to the axial or appendicular skeleton. CT chest 12/22/2017 noted no evidence of metastatic disease.   CT abdomen/pelvis 12/22/2017 noted stable hypodense lesions in the liver.  Continue FOLFIRI + Avastin and repeat scans in 3 months. Cycle # 37 FOLFIRI + Avastin was on 12/27/2018. CEA 6.7 on 12/27/2017. Cycle # 38 FOLFIRI + Avastin was on 01/09/2018. CEA 5.0 on 01/09/2018. Cycle # 39 FOLFIRI + Avastin was on 01/23/2018. CEA 6.5 on 01/23/2018. Cycle # 40 FOLFIRI + Avastin was on 02/06/2018. CEA 6.9 on 02/06/2018. Cycle # 41 FOLFIRI + Avastin was on 02/20/2018. CEA 6.4 on 02/20/2018. Cycle # 42 FOLFIRI + Avastin was on 03/06/2018. CEA 6.6 on 03/06/2018. Cycle # 43 FOLFIRI + Avastin was on 03/20/2018. CEA 5.7 on 03/20/2018. Bone scan on 03/26/2018 negative for metastatic disease. CT chest 03/26/2018 noted ? new 7 mm nodule in LUCY. CT abdomen/pelvis 03/26/2018 noted stable hypodense lesions in the liver. ? New small ascites in the abdomen. Patient wants to continue to monitor the lung finding and repeat scans in 2 months instead of changing the current chemotherapy regimen to oral Lonsurf. Cycle # 44 FOLFIRI + Avastin was on 04/03/2018. CEA 6.3 on 04/03/2018. Cycle # 45 FOLFIRI + Avastin was on 04/24/2018. CEA 5.3 on 04/24/2018. Cycle # 46 FOLFIRI + Avastin was on 05/08/2018. CEA 5.4 on 05/08/2018. Cycle # 47 FOLFIRI + Avastin was on 05/22/2018. CEA 6.1 on 05/22/2018. CT chest 05/31/2018 noted stable nodule in LUCY. No evidence of worsening malignancy. CT abdomen/pelvis on 05/31/2018 noted stable liver metastasis. No evidence of worsening malignancy. Continue FOLFIRI + Avastin and repeat scans in 3 months. Cycle # 48 FOLFIRI + Avastin was on 06/06/2018. CEA 6.3 on 06/05/2018. Cycle # 49 FOLFIRI + Avastin was on 06/19/2018. CEA 6.1 on 06/19/2018. Cycle # 50 FOLFIRI + Avastin was on 07/03/2018. CEA 7.4 on 07/03/2018. Cycle # 51 FOLFIRI + Avastin was on 07/24/2018. CEA 6.7 on 07/24/2018. Cycle # 52 FOLFIRI + Avastin was on 08/14/2018. CEA 6.8 on 08/14/2018. Cycle # 53 FOLFIRI + Avastin was on 08/28/2018. CEA 6.5 on 08/27/2018.   CT chest 09/06/2018 noted interval decrease in size of LUCY measuring up to 4 mm, suggestive of treatment response. No mediastinal or hilar LN  CT abdomen/pelvis 09/06/2018 Slight interval increase in size of a previously noted metastatic lesion within the right hepatic lobe. This may be related to differences in phase of enhancement compared to the most recent study from 5/31/2018, the lesion remains decreased in size compared to the more previous studies. No abdominal, retroperitoneal, or pelvic lymphadenopathy. Continue FOLFIRI + Avastin and repeat scans in 2 months. Cycle # 54 FOLFIRI + Avastin was on 09/11/2018. CEA 6.4 on 09/10/2018. Cycle # 55 FOLFIRI + Avastin was on 09/25/2018. CEA 6.4 on 09/25/2018. Cycle # 56 FOLFIRI + Avastin was on 10/09/2018. CEA 6.7 on 10/08/2018. Cycle # 57 FOLFIRI + Avastin was on 10/23/2018. CEA 7.2 on 10/22/2018. CT chest 11/02/2018 stable 3 mm nodule within LUCY. No new right and left lung nodule. No mediastinal or osseous lesion. CT abdomen/pelvis 11/02/2018 stable metastatic disease to liver. No new metastatic disease identified. No mesenteric or retroperitoneal LN. No gross colonic lesion identified. Continue FOLFIRI + Avastin and repeat scans in 3 months. Cycle # 58 FOLFIRI + Avastin was on 11/06/2018. CEA 7.6 on 11/05/2018. Cycle # 59 FOLFIRI + Avastin was on 11/27/2018. CEA 6.9 on 11/26/2018. Cycle # 60 FOLFIRI + Avastin was on 12/11/2018. CEA 6.7 on 12/11/2018. Cycle # 61 FOLFIRI + Avastin was on 01/08/2019. CEA 5.6 on 01/07/2019. Cycle # 62 FOLFIRI + Avastin was on 01/29/2019. CEA 4.6 on 01/28/2019. Cycle # 63 FOLFIRI + Avastin was on 02/12/2019. CEA 4.3 on 02/11/2019. CT chest 02/21/2019 no evidence of metastatic disease. CT abdomen/pelvis 02/21/2019 stable hypodense liver lesions. No evidence of worsening metastatic disease. 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  Ordered MRI thoracic spine and referred to neurosurgery team.  Cycle # 64 FOLFIRI + Avastin was on 02/26/2019. CEA 4.7 on 02/25/2019. Cycle # 65 FOLFIRI + Avastin was on 03/12/2019. CEA 4.0 on 03/11/2019. Cycle # 66 FOLFIRI + Avastin was on 03/26/2019. CEA 4.7 on 03/25/2019. Cycle # 67 FOLFIRI + Avastin was on 04/09/2019. CEA 5.6 on 04/08/2019. Cycle # 68 FOLFIRI + Avastin was on 04/30/2019. CEA 4.9 on 04/29/2019. Cycle # 69 FOLFIRI + Avastin was on 05/14/2019. CEA 5.3 on 05/14/2019. CT chest 05/23/2019 stable small lung nodule with no evidence of metastatic disease. CT abdomen/pelvis 05/23/2019 Decrease conspicuity of the liver lesions. No new lesions seen. Stable splenomegaly. Continue FOLFIRI + Avastin and repeat scans in 3 months. Cycle # 70 FOLFIRI + Avastin was on 06/04/2019. CEA 4.8 on 06/03/2019. Cycle # 71 FOLFIRI + Avastin was on 06/18/2019. CEA 4.6 on 06/17/2019. Cycle # 72 FOLFIRI + Avastin was on 07/09/2019. CEA 4.3 on 07/08/2019. Cycle # 73 FOLFIRI + Avastin was on 07/30/2019. CEA 5.0 on 07/29/2019. Cycle # 74 FOLFIRI + Avastin was on 08/13/2019. CEA 5.0 on 08/12/2019. CT chest 08/20/2019 negative  CT abdomen/pelvis 08/20/2019 Previous identified hepatic lesions are not visualized on the current exam.  Continue FOLFIRI + Avastin and repeat scans in 3 months. Cycle # 75 FOLFIRI + Avastin was on 08/27/2019. CEA 5.0 on 08/26/2019. Cycle # 76 FOLFIRI + Avastin was on 09/17/2019. CEA 5.5 on 09/16/2019. Cycle # 77 FOLFIRI + Avastin was on 10/15/2019. CEA 4.6 on 10/15/2019. Cycle # 78 FOLFIRI + Avastin was on 10/29/2019. CEA 4.1 on 10/28/2019. Cycle # 79 FOLFIRI + Avastin was on 11/12/2019. CEA 4.3 on 11/12/2019. Cycle # 80 FOLFIRI + Avastin was on 12/10/2019. CEA 4.0 on 12/09/2019. CT chest 12/19/2019 Stable 3 mm nodule within the left upper lobe, similar to the previous studies dating back to 11/2/2018, however decreased in size compared to the CT from 3/26/2018. No thoracic LN.    CT abdomen/pelvis 12/19/2019 Previously described small hypodense lesions are more conspicuous on the current study compared to the recent study throughout the liver from 8/20/2019, however similar to the prior study from 2/21/2019. Findings may be related to differences in contrast opacification. No abdominal or pelvic lymphadenopathy. Continue FOLFIRI + Avastin and repeat scans in 2 months to f/u on liver lesions. Cycle # 81 FOLFIRI + Avastin was on 01/07/2020. CEA 3.8 on 01/06/2020. Cycle # 82 FOLFIRI + Avastin was on 01/21/2020. CEA 3.7 on 01/20/2020. Cycle # 83 FOLFIRI + Avastin was on 02/04/2020. CEA 4.1 on 02/03/2020. Cycle # 84 FOLFIRI + Avastin was on 02/18/2020. CEA 4.6 on 02/17/2020. CT chest 2/28/2020 no evidence of metastatic disease to the lungs and no mediastinal or hilar adenopathy. CT abdomen pelvis 2/28/2020 no enhancing lesions seen within the liver to suggest metastatic disease. Wall thickening of the rectosigmoid junction. Images reviewed. Continue FOLFIRI Avastin and repeat scans in 3 months  EGD by Dr. Mnoi Cook 03/02/2020 showing no masses or lesions. Cycle # 85 FOLFIRI + Avastin was on 03/03/2020. CEA 7.4 on 03/02/2020. Cycle # 86 FOLFIRI + Avastin was on 03/17/2020. CEA 4.5 on 03/16/2020. Colonoscopy on 03/23/2020 by Dr. Moni aguero (records requested). Cycle # 87 FOLFIRI + Avastin was on 03/31/2020. CEA 4.1 on 03/30/2020. Cycle # 88 FOLFIRI + Avastin was on 04/21/2020. CEA 6.4 on 04/20/2020. Cycle # 89 FOLFIRI + Avastin was on 05/05/2020. CEA 5.8 on 05/04/2020. Cycle # 90 FOLFIRI + Avastin was on 06/02/2020. CEA 5.5 on 06/01/2020. Cycle # 91 FOLFIRI + Avastin was on 06/16/2020. CEA 5.6 on 06/15/2020. CT chest 06/23/2020 noted no metastatic disease in the chest.   CT abdomen/pelvis 06/23/2020 Stable small liver lesions measuring up to 5 mm since 2019.   22 mm round mass in segment 8 of the liver with central high density stable from December 2019), previously measuring up to 34 mm in 2017.   No additional metastatic disease in the abdomen or pelvis. Small amount of ascites. Overall stable disease. Continue FOLFIRI + Avastin and repeat scans in 2-3 months. Cycle # 92 FOLFIRI + Avastin was on 07/07/2020. CEA 5.7 on 07/06/2020. Cycle # 93 FOLFIRI + Avastin was on 07/21/2020. CEA 5.9 on 07/20/2020. Cycle # 94 FOLFIRI + Avastin was on 08/04/2020. CEA 5.5 on 08/04/2020. Cycle # 95 FOLFIRI + Avastin was on 08/25/2020. CEA 6.1 on 08/24/2020. CT chest 9/2/2020 stable unremarkable enhanced CT of the thorax. There is no metastatic disease to the lungs. CT abdomen pelvis on 9/2/2020 stable 2.2 cm low attenuated lesion within the central aspect of the right hepatic lobe favored to represent treated metastatic disease. No new hepatic lesion is identified. Stable splenomegaly  There is no appreciable colonic lesion or stricture. Pericholecystic fluid of uncertain etiology. Laparoscopic cholecystectomy with normal cholangiogram 10/27/20  Gallbladder: Chronic cholecystitis and cholelithiasis. Unremarkable regional lymph node. 11/13/2020; Seen by Dr. Candance Piano from General surgery team; cleared to re-start chemotherapy. Cycle # 96 FOLFIRI + Avastin was on 11/17/2020. CEA 3.6 on 11/16/2020. Cycle # 97 FOLFIRI + Avastin was on 12/01/2020. CEA 3.5 on 11/30/2020. Cycle # 98 FOLFIRI + Avastin was on 12/15/2020. CEA 4.3 on 12/15/2020. Cycle # 99 FOLFIRI + Avastin was on 01/05/2021. CEA 4.7 on 01/04/2021. CT chest 01/13/2021 negative for metastatic disease. CT abdomen/pelvis 01/13/2021 Unchanged central hepatic lesion. No specific signs of abdominopelvic metastatic disease. Continue FOLFIRI + Avastin and repeat scans in 3 months. Cycle # 100 FOLFIRI + Avastin was on 01/19/2021. CEA 4.7 on 01/18/2021. Cycle # 101 FOLFIRI + Avastin was on 02/02/2021. CEA 5.2 on 02/01/2021. Cycle # 102 FOLFIRI + Avastin was on 02/16/2021. CEA 5.6 on 02/15/2021. Cycle # 103 FOLFIRI + Avastin was on 03/02/2021.   Cycle # 104 FOLFIRI (20% dose reduced due to significant toxicity) + Avastin was on 03/16/2021. Cycle # 105 FOLFIRI (same dose as cycle # 104) + Avastin was on 03/30/2021. CEA 6.5 on 03/29/2021. Cycle # 106 FOLFIRI (same dose as cycle # 104) + Avastin was on 04/13/2021. CEA 6.0 on 04/12/2021  CT chest 04/20/2021 no evidence of metastatic disease. CT abdomen/pelvis 04/20/2021 No evidence of progressive metastatic disease. Stable 2 cm lesion in the right lobe of the liver, likely representing treated metastatic disease. Imaging reviewed. Continue FOLFIRI + Avastin and repeat scans in 3 months  Cycle # 107 FOLFIRI + Avastin was on 04/27/2021  Cycle # 108 FOLFIRI + Avastin was on 05/11/2021. CEA 6.4 on 05/10/2021. Cycle # 109 FOLFIRI (held Avastin due to upcoming surgery) on 05/25/2021. CEA 6.3 on 5/24/21  Cycle # 110 FOLFIRI (held Avastin due to upcoming surgery) on 06/08/2021. CEA 6.3 on 6/07/21. CEA 5.8 on 06/28/2021. Right inguinal/scrotol hernia repair on 07/13/2021 with Dr Afshin Whaley with folely removal on 07/23/2021. He developed fever on 07/24/2021 and was brought to [address] [place], [place] [address] ER via EMS; Sepsis secondary to urinary tract infection; Completed Abx  CT chest 08/13/2021 no sign of recurrent or metastatic disease. CT abdomen/pelvis 08/13/2021 unchanged hepatic lesions. Splenomegaly and secondary signs of portal venous hypertension  CEA 4.0 on 08/16/2021  CEA 4.1 on 08/30/2021  Cycle # 111 FOLFIRI was on 08/31/2021. HBP team for evaluation of liver lesions; His lesion does appear to be a hemangioma as his prior metastatic deposits in his liver were hypo attenuating compared to this CT which is hyper attenuating. compared to his prior CT scans he has disappearing liver metastasis. MRI liver on 09/17/2021 No suspicious liver lesion identified. Colonoscopy GI Dr. Stanislaw Ritchie on 09/20/2021 unremarkable  CEA 3.3 on 09/27/2021. CEA 3.2 on 10/25/2021. CT chest 11/08/2021: Similar chronic appearing findings.   No acute process or evidence of metastatic disease identified. CT Abdomen/pelvis 11/08/2021: No significant change in the appearance of the liver. Similar appearance of splenomegaly. No interval change. CEA 2.7 on 11/22/2021. CEA 2.5 on 12/27/2021  CEA 2.4 on 01/24/2022  CT chest/abdomen/pelvis 02/04/2022 Stable CT of the chest abdomen/ pelvis with the a 1.4 cm enhancing nodule in the right hepatic lobe which is marginally improved. Imaging reviewed. CEA 2.3 on 02/21/2022  CEA 2.4 on 03/21/2022  CEA 2.2 on 04/18/2022  CT chest abdomen pelvis 5/11/2022: progressive hepatic metastasis with increasing number and prominence of the hepatic lesions  Soft tissue density in the ileocecal valve probably fecal matter. CEA 2.2 on 05/16/2022  CEA 2.3 on 06/06/2022  PET/CT scan 06/06/2022 the recently described hepatic lesions on CT are not significantly hypermetabolic on PET relative to hepatic background activity. No generalized pattern of FDG avid metastatic disease  Imaging reviewed. HBP noted on 06/14/2022 reviewed; No evidence of any recurrence on repeat imaging. Evaluated by Dr. Lex Vallejo on 06/20/2022  CEA 2.4 on 07/02/2022  Colonoscopy on July 11, 2022 noted colon polyp x1 in the descending colon removed by cold snare polypectomy  Mild diverticulosis of the sigmoid colon  Previous cancer site and spot tattoo near the rectosigmoid junction with no gross recurrence in the lumen of the colon with biopsies of the mucosa taken  Small internal and external hemorrhoids  A. Descending colon polyp polypectomy: Tubular adenoma  B. Previous cancer SPOT biopsy:   Colonic mucosa with no significant pathologic changes  Negative for malignancy  Recommend follow-up colonoscopy in 2 years for surveillance  Operative note reviewed. Pathology reviewed.    CEA 2.3 on 08/01/2022  CEA 2.1 on 08/29/2022  CT chest 09/21/2022: No evidence of metastatic disease  CT abdomen/pelvis 09/21/2022: Previously measured areas in the liver are essentially unchanged and indeterminate with no significant hypermetabolism on recent PET-CT. No new hepatic lesion. No evidence for recurrent or metastatic disease within the abdomen or pelvis. CEA 2.1 on 09/26/2022. CEA 1.7 on 10/24/2022. CEA 2.3 on 11/21/2022  CEA 2.3 on 12/19/2022. CT chest/abdomen/pelvis 1/20/2023 stable examination with no evidence of metastatic disease or recurrence. New low-attenuation areas seen within the superior aspect of the liver are unchanged. There are no new findings. CEA 2.7 on 01/20/2023  Imaging reviewed. Labs reviewed. BUN 24 Creat 1.3; recommended to increase p.o. fluid intake  Port flush today 01/24/2023    RTC 1 month with labs   MSI testing noted no mismatch repair protein loss of expression  NGS testing ([employer])   MS-Stable: No therapies or clinical trials  TMB 3Muts/Mb: No therapies or clinical trials  KRAS G12R: Therapies with clinical relevance in patient's tumor type: Cetuximab, Panitumumab  Therapies with clinical relevance in other tumor type: None  NRAS wild type: Therapies with clinical relevance in patient's tumor type: Cetuximab, Panitumumab  Therapies with clinical relevance in other tumor type: None  APC: None None  MARIELA: None None    1/24/2023  Rudene Soulier, MD  Board Certified Medical Oncologist  I spent a total of 30 minutes on the date of the service which included preparing to see the patient, face-to-face patient care, completing clinical documentation, counseling and educating the family/family members/caregiver, ordering medications, tests, or procedures complicating results with the patient/family/caregiver.

## 2023-01-24 NOTE — PROGRESS NOTES
PORT FLUSH    Patient presents to clinic for [facility] today. L chest  SQ port accessed per policy using 55S 6.48SL Campos needle for good blood return. Site flushed easily with 10 mL NSS followed by 5 mL Heparin solution 100 units/ml rinse prior to de-access. Dry sterile dressing to area. Tolerated procedure well. Encouraged to schedule port flush every 4 weeks.

## 2023-02-20 ENCOUNTER — HOSPITAL ENCOUNTER (OUTPATIENT)
Age: 73
Discharge: HOME | End: 2023-02-20
Payer: MEDICARE

## 2023-02-20 DIAGNOSIS — C20 RECTAL CANCER (HCC): ICD-10-CM

## 2023-02-20 LAB
ALBUMIN SERPL-MCNC: 4.3 G/DL (ref 3.5–5.2)
ALP BLD-CCNC: 133 U/L (ref 40–129)
ALT SERPL-CCNC: 20 U/L (ref 0–40)
ANION GAP SERPL CALCULATED.3IONS-SCNC: 11 MMOL/L (ref 7–16)
AST SERPL-CCNC: 23 U/L (ref 0–39)
BASOPHILS ABSOLUTE: 0.04 E9/L (ref 0–0.2)
BASOPHILS RELATIVE PERCENT: 0.6 % (ref 0–2)
BILIRUB SERPL-MCNC: 0.8 MG/DL (ref 0–1.2)
BUN BLDV-MCNC: 19 MG/DL (ref 6–23)
CALCIUM SERPL-MCNC: 9.8 MG/DL (ref 8.6–10.2)
CEA: 2.3 NG/ML (ref 0–5.2)
CHLORIDE BLD-SCNC: 106 MMOL/L (ref 98–107)
CO2: 22 MMOL/L (ref 22–29)
CREAT SERPL-MCNC: 1.3 MG/DL (ref 0.7–1.2)
EOSINOPHILS ABSOLUTE: 0.14 E9/L (ref 0.05–0.5)
EOSINOPHILS RELATIVE PERCENT: 2.2 % (ref 0–6)
GFR SERPL CREATININE-BSD FRML MDRD: 58 ML/MIN/1.73
GLUCOSE BLD-MCNC: 121 MG/DL (ref 74–99)
HCT VFR BLD CALC: 37.9 % (ref 37–54)
HEMOGLOBIN: 12.8 G/DL (ref 12.5–16.5)
IMMATURE GRANULOCYTES #: 0.04 E9/L
IMMATURE GRANULOCYTES %: 0.6 % (ref 0–5)
LYMPHOCYTES ABSOLUTE: 0.77 E9/L (ref 1.5–4)
LYMPHOCYTES RELATIVE PERCENT: 12.3 % (ref 20–42)
MCH RBC QN AUTO: 29.9 PG (ref 26–35)
MCHC RBC AUTO-ENTMCNC: 33.8 % (ref 32–34.5)
MCV RBC AUTO: 88.6 FL (ref 80–99.9)
MONOCYTES ABSOLUTE: 0.39 E9/L (ref 0.1–0.95)
MONOCYTES RELATIVE PERCENT: 6.3 % (ref 2–12)
NEUTROPHILS ABSOLUTE: 4.86 E9/L (ref 1.8–7.3)
NEUTROPHILS RELATIVE PERCENT: 78 % (ref 43–80)
PDW BLD-RTO: 13.4 FL (ref 11.5–15)
PLATELET # BLD: 141 E9/L (ref 130–450)
PMV BLD AUTO: 10.2 FL (ref 7–12)
POTASSIUM SERPL-SCNC: 4.1 MMOL/L (ref 3.5–5)
RBC # BLD: 4.28 E12/L (ref 3.8–5.8)
SODIUM BLD-SCNC: 139 MMOL/L (ref 132–146)
TOTAL PROTEIN: 7.7 G/DL (ref 6.4–8.3)
WBC # BLD: 6.2 E9/L (ref 4.5–11.5)

## 2023-02-20 PROCEDURE — 82378 CARCINOEMBRYONIC ANTIGEN: CPT

## 2023-02-20 PROCEDURE — 80053 COMPREHEN METABOLIC PANEL: CPT

## 2023-02-20 PROCEDURE — 85025 COMPLETE CBC W/AUTO DIFF WBC: CPT

## 2023-02-20 PROCEDURE — 36415 COLL VENOUS BLD VENIPUNCTURE: CPT

## 2023-02-21 ENCOUNTER — HOSPITAL ENCOUNTER (OUTPATIENT)
Dept: INFUSION THERAPY | Age: 73
Discharge: HOME | End: 2023-02-21
Payer: MEDICARE

## 2023-02-21 ENCOUNTER — OFFICE VISIT (OUTPATIENT)
Dept: ONCOLOGY | Age: 73
End: 2023-02-21
Payer: MEDICARE

## 2023-02-21 VITALS
DIASTOLIC BLOOD PRESSURE: 72 MMHG | HEIGHT: 73 IN | BODY MASS INDEX: 31.12 KG/M2 | WEIGHT: 234.8 LBS | SYSTOLIC BLOOD PRESSURE: 144 MMHG | TEMPERATURE: 98.2 F | HEART RATE: 57 BPM | OXYGEN SATURATION: 99 %

## 2023-02-21 DIAGNOSIS — C20 RECTAL CANCER METASTASIZED TO LIVER (HCC): Primary | ICD-10-CM

## 2023-02-21 DIAGNOSIS — C20 RECTAL CANCER (HCC): Primary | ICD-10-CM

## 2023-02-21 DIAGNOSIS — C78.7 RECTAL CANCER METASTASIZED TO LIVER (HCC): Primary | ICD-10-CM

## 2023-02-21 PROCEDURE — 1123F ACP DISCUSS/DSCN MKR DOCD: CPT | Performed by: INTERNAL MEDICINE

## 2023-02-21 PROCEDURE — 96523 IRRIG DRUG DELIVERY DEVICE: CPT

## 2023-02-21 PROCEDURE — 6360000002 HC RX W HCPCS: Performed by: INTERNAL MEDICINE

## 2023-02-21 PROCEDURE — 99213 OFFICE O/P EST LOW 20 MIN: CPT | Performed by: INTERNAL MEDICINE

## 2023-02-21 PROCEDURE — 2580000003 HC RX 258: Performed by: INTERNAL MEDICINE

## 2023-02-21 RX ORDER — HEPARIN SODIUM (PORCINE) LOCK FLUSH IV SOLN 100 UNIT/ML 100 UNIT/ML
500 SOLUTION INTRAVENOUS PRN
OUTPATIENT
Start: 2023-02-21

## 2023-02-21 RX ORDER — HEPARIN SODIUM (PORCINE) LOCK FLUSH IV SOLN 100 UNIT/ML 100 UNIT/ML
500 SOLUTION INTRAVENOUS PRN
Status: DISCONTINUED | OUTPATIENT
Start: 2023-02-21 | End: 2023-02-22 | Stop reason: HOSPADM

## 2023-02-21 RX ORDER — WATER 1000 ML/1000ML
2.2 INJECTION, SOLUTION INTRAVENOUS ONCE
OUTPATIENT
Start: 2023-02-21 | End: 2023-02-21

## 2023-02-21 RX ORDER — SODIUM CHLORIDE 0.9 % (FLUSH) 0.9 %
10 SYRINGE (ML) INJECTION PRN
Status: DISCONTINUED | OUTPATIENT
Start: 2023-02-21 | End: 2023-02-22 | Stop reason: HOSPADM

## 2023-02-21 RX ORDER — SODIUM CHLORIDE 0.9 % (FLUSH) 0.9 %
10 SYRINGE (ML) INJECTION PRN
OUTPATIENT
Start: 2023-02-21

## 2023-02-21 RX ADMIN — SODIUM CHLORIDE, PRESERVATIVE FREE 10 ML: 5 INJECTION INTRAVENOUS at 09:31

## 2023-02-21 RX ADMIN — Medication 500 UNITS: at 09:32

## 2023-02-21 NOTE — PROGRESS NOTES
[place] [id number]            Attending Clinic Note     Reason for Visit: Follow-up on a patient with Metastatic Rectal Cancer. PCP: Josh Jara MD     History of Present Illness:  68 y/o  male who was referred to see Dr. Carlie Cheung (GI team) for evaluation of bright red blood per rectum, mild anemia and change in bowel habits with diarrhea. CEA 2640 on 10/19/2015. AlcP 299 AST 57 ALT 75 on 10/19/2015. Colonoscopy in 2013 noted no significant polyps, colitis or lesions at that time. Denies any Family History of colorectal cancer or polyps. Colonoscopy on 10/19/2015 revealed:  1. Ascending polyp, 8 mm, hot snare: Tubulovillous adenoma. 2. Transverse polyp, 1 cm, hot snare: Serrated polyp most consistent with sessile serrated adenoma. 3. Five splenic flexure polyps, three - 5 mm, 7 mm, 8 mm, hot snare and biopsy: Four segments of Tubular Adenoma  4. Descending polyp, 5 mm, biopsy: Tubular adenoma. 5. Sigmoid polyp, 4 mm biopsy, Serrated polyp most consistent with sessile serrated adenoma. 6. Two rectal polyps, 4 mm biopsy: Serrated polyp most consistent with hyperplastic polyp. 7. Rectosigmoid colon mass (large mass approximately 65% circumference of the lumen; Very friable, firm and hard): Tubulovillous adenoma with associated focal erosion and fibroplasia. CT scan abdomen/pelvis on 10/26/2015:  1. Small nodules at lung bases likely represent metastatic colon   cancer. 2. Extensive likely metastatic colon cancer throughout the liver. 3. Mild mural thickening and luminal narrowing in the   terminal ileum, otherwise nonspecific. Colonoscopy with snare removal rectal mass was performed by Dr. Erum Gonzalez. Pathology proved:  Rectal polyp: Invasive adenocarcinoma involving villous adenoma and extending to the cauterized edge of excision. KRAS Mutation: Mutation detected.   BRAF Mutation: Mutation not detected.  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     CEA 3488. Bone scan on 11/10/2015 noted no metastatic disease. CT chest on 11/10/2015 revealed Multiple pulmonary nodules in the upper and lower lobes consistent with metastatic disease;   Hepatic metastasis also visualized. 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 Mediport was placed.  Cycle # 1 of FOLFOX + Avastin was on 11/23/2015. CEA was 4555 on 11/23/2015.  Cycle # 2 of FOLFOX + Avastin was on 12/08/2015. CEA was 3160 on 12/08/2015.  Cycle # 3 of FOLFOX + Avastin was on 12/22/2015. CEA was 2516 on 12/21/2015.  Cycle # 4 of FOLFOX + Avastin was on 01/05/2016. CEA was 2098 on 01/05/2015.  Cycle # 5 of FOLFOX + Avastin was on 01/19/2016. CEA was 1511 on 01/05/2015.     -Bone scan on 01/26/2016 noted no metastatic disease. CT chest on 01/26/2016 revealed Significant response to treatment with no visible residual nodules.  CT scan abdomen/pelvis on 01/26/2016 noted Interval decreased size of multiple masses in the liver compatible with treatment response.  Continue another 2 months of FOLFOX + Avastin and repeat scans.  Cycle # 6 of FOLFOX + Avastin was on 02/02/2016.  on 02/02/2016.  Cycle # 7 of FOLFOX + Avastin was on 02/16/2016.  on 02/16/2016.  Cycle # 8 of FOLFOX + Avastin was on 03/01/2016.  on 03/01/2016.  Cycle # 9 of FOLFOX + Avastin was on 03/15/2016.  on 03/15/2016.  Cycle # 10 of FOLFOX + Avastin was on 03/29/2016..4 on 03/29/2016.  Cycle # 11 of FOLFOX + Avastin was on 04/12/2016..4 on 04/12/2016.     Re-staging scans on 04/19/2016: CT Chest: clear lungs; no evidence of recurring pulmonary nodule; CT Abdomen/Pelvis: Further interval decrease in size of the multiple metastatic hepatic lesions, the largest lesion now measures 3.9 x 3.5 cm and previously measured 4.5 cm in maximum diameter. No mesenteric lymphadenopathy is identified; Bone Scan:  No evidence of osseous metastasis. Continue same regimen and re-stage in 2-3 months. Cycle # 12 FOLFOX + Avastin was on 04/26/2016. .5 on 04/26/2016. Cycle # 13 FOLFOX + Avastin was on 05/10/2016. .3 on 05/10/2016. Cycle # 14 FOLFOX+AVASTIN was on 05/24/2016. .5 on 05/24/2016. Cycle # 15 FOLFOX + Avastin was on 06/07/2016. CEA 90.5 on 06/07/2016. Admitted to [facility] 06/13/2016-06/16/2016 for abdominal pain: EGD noted 1.5 cm clean based duodenal bulb ulceration s/p epinephrine and bicap per Dr. Mona Casillas. No active bleeding. A. Stomach, biopsy: Mild chronic gastritis, immunostain negative for Helicobacter  B. Esophagus, biopsy: Gastric glandular mucosa with prominent intestinal metaplasia ([place]'s epithelium), negative for epithelial dysplasia, esophageal squamous mucosa not identified  Cycle # 16 FOLFOX (discontinued avastin (bevacizumab) given association of peptic ulcer disease and known association of GI perforation) was on 06/21/2016. Cycle # 17 FOLFOX was on 07/05/2016. CEA 52.6 on 07/19/2016. Cycle # 17 FOLFOX was on 07/05/2016. CEA 52.6 on 07/05/2016. CEA 47.6 on 07/19/2016. Re-staging scans 07/19/2106: CT Chest negative for metastatic disease. CT Abdomen/Pelvis: Stable hepatic lesions; Question new mural thickening in the cecum. Bone Scan: New Let hip lesion suspicious for bone metastasis. Increased CEA; new mural thickening in the cecum and new left hip lesion suspicious for bone metastasis are consistent with disease progression; He derived maximum benefit from FOLFOX/AVASTIN. D/C FOLFOX/AVASTIN. We recommended FOLFIRI second line therapy. Cycle # 1 FOLFIRI was on 08/02/2016. CEA 40.8 on 08/02/2016. Xgeva q4 weeks started on 08/02/2016. Cycle # 2 FOLFIRI was on 08/16/2016. CEA 31.7 on 08/16/2016. Cycle # 3 FOLFIRI (added Avastin) was on 08/30/2016 given that ulcers healed on EGD 08/15/2016 by Dr. Bridger Gore; Protonix bid since avastin re-started.    Colonoscopy on 08/29/2016 (to look at the cecal area) unremarkable. CEA 26.7 on 08/30/2016. Cycle # 4 FOLFIRI/Avastin was on 09/13/2016. CEA 23.4 on 09/13/2016. Cycle # 5 FOLFIRI/Avastin was on 09/27/2016. CEA 22.3 on 09/27/2016. Cycle # 6 FOLFIRI/Avastin was on 10/11/2016. CEA 18.4 on 10/11/2016. Bone scan 10/21/2016 stable bone metastasis; ? New lesion anterior left sixth rib likely interval healing fracture. CT abdomen/pelvis revealed stable hepatic metastasis. CT chest negative for metastatic disease. Continue FOLFIRI/Avastin and repeat scans in 3 months. Cycle # 7 FOLFIRI/Avastin was on 10/25/2016. CEA 16.1  Cycle # 8 FOLFIRI/Avastin was on 11/08/2016. CEA 15.7  Cycle # 9 FOLFIRI/Avastin was on 11/29/2016. CEA 13.6 on 11/29/2016. Cycle # 10 FOLFIRI/Avastin was on 12/13/2016. CEA 10.6 on 12/13/2016. Cycle # 11 FOLFIRI/Avastin was on 12/27/2016. CEA 11.1 on 12/27/2016. Cycle # 12 FOLFIRI/Avastin was on 01/10/2017. CEA 9.7 on 01/10/2017. CT chest 01/23/2017 No new pulmonary nodule or lymphadenopathy. Patchy groundglass opacities within the left lower lobe, suggestive of infectious or inflammatory etiology. Followup CT chest in 3 months. Slight increased linear sclerosis along the left anterior sixth rib corresponding to an area of increased uptake on the prior bone scan from 10/21/2016, favored to be related to interval healing changes of a nondisplaced fracture. CT abdomen/pelvis on 01/23/2017 Redemonstration of multiple hepatic metastases, which are similar compared to the prior CT from 10/21/2016. Redemonstration of area of increased sclerosis along the right acetabulum suspicious for metastatic disease. Bone scan on 01/23/2017 Stable subtle uptake within the left proximal diaphysis, again suggestive of metastatic disease  Decreased subtle uptake within the medial right acetabulum.    Decreased uptake within the left anterior sixth rib corresponding to linear sclerosis on the recent CT, favored to be related to an joint rib fracture. Continue FOLFIRI + Avastin and repeat scans in 3 months. Cycle # 13 FOLFIRI + Avastin was on 01/24/2017. Cycle # 14 FOLFIRI + Avastin was on 02/07/2017. Cycle # 15 FOLFIRI + Avastin was on 02/21/2017. Cycle # 16 FOLFIRI + Avastin was on 03/07/2017. Cycle # 17 FOLFIRI + Avastin was on 03/21/2017. Cycle # 18 FOLFIRI + Avastin was on 04/04/2017. CT chest 04/17/2017 negative for metastatic disease. CT abdomen/pelvis 04/17/2017 Stable hypodense metastatic lesions within the liver. Stable area of increased sclerosis along the right acetabulum  Bone scan 04/17/2017 Stable subtle uptake within the left proximal femoral diaphysis; No definite abnormal uptake in the region of a sclerotic lesion along the posterior column of the right acetabulum noted on CT. Stable slight uptake within the left anterior sixth rib corresponding to linear sclerosis on the recent CT, favored to be related to a fracture. Continue FOLFIRI + Avastin and repeat scans in 3 months. Cycle # 19 FOLFIRI + Avastin was on 04/18/2017. Cycle # 20 FOLFIRI + Avastin was on 05/02/2017. Cycle # 21 FOLFIRI + Avastin was on 05/16/2017. Cycle # 22 FOLFIRI + Avastin was on 05/30/2017. Cycle # 23 FOLFIRI + Avastin was on 06/13/2017. Cycle # 24 FOLFIRI + Avastin was on 06/27/2017. Cycle # 25 FOLFIRI + Avastin was on 07/11/2017. Cycle # 26 FOLFIRI + Avastin was on 07/25/2017. Cycle # 27 FOLFIRI + Avastin was on 08/08/2017. Cycle # 28 FOLFIRI + Avastin was on 08/22/2017. Cycle # 29 FOLFIRI + Avastin was on 09/05/2017. Cycle # 30 FOLFIRI + Avastin was on 09/19/2017. Bone scan 09/26/2017 stable. CT abdomen/pelvis on 09/26/2017 Stable hypodense mass in the liver. Stable sclerotic lesion in the acetabulum. CT chest 09/26/2017 negative for metastatic disease. Cycle # 31 FOLFIRI + Avastin was on 10/03/2017. CEA 7.4 on 10/03/2017. Cycle # 32 FOLFIRI + Avastin was on 10/17/2017.  CEA 6.0 on 10/17/2017. Cycle # 33 FOLFIRI + Avastin was on 10/31/2017. CEA 6.8 on 10/31/2017. Cycle # 34 FOLFIRI + Avastin was on 11/14/2017. CEA 7.2 on 11/14/2017. Cycle # 35 FOLFIRI + Avastin was on 11/28/2017. CEA 5.1 on 11/28/2017. Cycle # 36 FOLFIRI + Avastin was on 12/12/2017. CEA 6.1 on 12/12/2017. Bone scan 12/22/2017 noted no evidence of metastatic disease to the axial or appendicular skeleton. CT chest 12/22/2017 noted no evidence of metastatic disease. CT abdomen/pelvis 12/22/2017 noted stable hypodense lesions in the liver. Continue FOLFIRI + Avastin and repeat scans in 3 months. Cycle # 37 FOLFIRI + Avastin was on 12/27/2018. Cycle # 38 FOLFIRI + Avastin was on 01/09/2018. Cycle # 39 FOLFIRI + Avastin was on 01/23/2018. Cycle # 40 FOLFIRI + Avastin was on 02/06/2018. Cycle # 41 FOLFIRI + Avastin was on 02/20/2018. Cycle # 42 FOLFIRI + Avastin was on 03/06/2018. Cycle # 43 FOLFIRI + Avastin was on 03/20/2018. Bone scan on 03/26/2018 negative for metastatic disease. CT chest 03/26/2018 noted ? new 7 mm nodule in LUCY. CT abdomen/pelvis 03/26/2018 noted stable hypodense lesions in the liver. ? New small ascites in the abdomen. Patient wants to continue to monitor the lung finding and repeat scans in 2 months instead of changing the current regimen into oral Lonsurf. Cycle # 44 FOLFIRI + Avastin was on 04/03/2018. CEA 6.3 on 04/03/2018. Cycle # 45 FOLFIRI + Avastin was on 04/24/2018. CEA 5.3 on 04/24/2018. Cycle # 46 FOLFIRI + Avastin was on 05/08/2018. CEA 5.4 on 05/08/2018. Cycle # 47 FOLFIRI + Avastin was on 05/22/2018. CEA 6.1 on 05/22/2018. CT chest 05/31/2018 noted stable nodule in LUCY. No evidence of worsening malignancy. CT abdomen/pelvis on 05/31/2018 noted stable liver metastasis. No evidence of worsening malignancy. Continue FOLFIRI + Avastin and repeat scans in 3 months. Cycle # 48 FOLFIRI + Avastin was on 06/06/2018. Cycle # 49 FOLFIRI + Avastin was on 06/19/2018. Cycle # 50 FOLFIRI + Avastin was on 07/03/2018. Cycle # 51 FOLFIRI + Avastin was on 07/24/2018. Cycle # 52 FOLFIRI + Avastin was on 08/14/2018. Cycle # 53 FOLFIRI + Avastin was on 08/28/2018. CT chest 09/06/2018 noted interval decrease in size of LUCY measuring up to 4 mm, suggestive of treatment response. No mediastinal or hilar LN  CT abdomen/pelvis 09/06/2018 Slight interval increase in size of a previously noted metastatic lesion within the right hepatic lobe. This may be related to differences in phase of enhancement compared to the most recent study from 5/31/2018, the lesion remains decreased in size compared to the more previous studies. No abdominal, retroperitoneal, or pelvic lymphadenopathy. Continue FOLFIRI + Avastin and repeat scans in 2 months. Cycle # 54 FOLFIRI + Avastin was on 09/11/2018. Cycle # 55 FOLFIRI + Avastin was on 09/25/2018. Cycle # 56 FOLFIRI + Avastin was on 10/09/2018. Cycle # 57 FOLFIRI + Avastin was on 10/23/2018. CT chest 11/02/2018 stable 3 mm nodule within LUCY. No new right and left lung nodule. No mediastinal or osseous lesion. CT abdomen/pelvis 11/02/2018 stable metastatic disease to liver. No new metastatic disease identified. No mesenteric or retroperitoneal LN. No gross colonic lesion identified. Continue FOLFIRI + Avastin and repeat scans in 3 months. Cycle # 58 FOLFIRI + Avastin was on 11/06/2018. CEA 7.6 on 11/05/2018. Cycle # 59 FOLFIRI + Avastin was on 11/27/2018. CEA 6.9 on 11/26/2018. Cycle # 60 FOLFIRI + Avastin was on 12/11/2018. CEA 6.7 on 12/11/2018. Cycle # 61 FOLFIRI + Avastin was on 01/08/2019. CEA 5.6 on 01/07/2019. Cycle # 62 FOLFIRI + Avastin was on 01/29/2019. CEA 4.6 on 01/28/2019. Cycle # 63 FOLFIRI + Avastin was on 02/12/2019. CEA 4.3 on 02/11/2019. CT chest 02/21/2019 no evidence of metastatic disease. CT abdomen/pelvis 02/21/2019 stable hypodense liver lesions. No evidence of worsening metastatic disease.  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 Ordered MRI thoracic spine and referred to neurosurgery team.    Cycle # 64 FOLFIRI + Avastin was on 02/26/2019. CEA 4.7 on 02/25/2019. Cycle # 65 FOLFIRI + Avastin was on 03/12/2019. CEA 4.0 on 03/11/2019. Cycle # 66 FOLFIRI + Avastin was on 03/26/2019. CEA 4.7 on 03/25/2019. Cycle # 67 FOLFIRI + Avastin was on 04/09/2019. CEA 5.6 on 04/08/2019. Cycle # 68 FOLFIRI + Avastin was on 04/30/2019. CEA 4.9 on 04/29/2019. Cycle # 69 FOLFIRI + Avastin was on 05/14/2019. CEA 5.3 on 05/14/2019. CT chest 05/23/2019 stable small lung nodule with no evidence of metastatic disease. CT abdomen/pelvis 05/23/2019 Decrease conspicuity of the liver lesions. No new lesions seen. Stable splenomegaly. Continue FOLFIRI + Avastin and repeat scans in 3 months. Cycle # 70 FOLFIRI + Avastin was on 06/04/2019. CEA 4.8 on 06/03/2019. Cycle # 71 FOLFIRI + Avastin was on 06/18/2019. CEA 4.6 on 06/17/2019. Cycle # 72 FOLFIRI + Avastin was on 07/09/2019. CEA 4.3 on 07/08/2019. Cycle # 73 FOLFIRI + Avastin was on 07/30/2019. CEA 5.0 on 07/29/2019. Cycle # 74 FOLFIRI + Avastin was on 08/13/2019. CEA 5.0 on 08/12/2019. CT chest 08/20/2019 negative  CT abdomen/pelvis 08/20/2019 Previous identified hepatic lesions are not visualized on the current exam.  Continue FOLFIRI + Avastin and repeat scans in 3 months. Cycle # 75 FOLFIRI + Avastin was on 08/27/2019. CEA 5.0 on 08/26/2019. Cycle # 76 FOLFIRI + Avastin was on 09/17/2019. CEA 5.5 on 09/16/2019. Cycle # 77 FOLFIRI + Avastin was on 10/15/2019. CEA 4.6 on 10/15/2019. Cycle # 78 FOLFIRI + Avastin was on 10/29/2019. CEA 4.1 on 10/28/2019. Cycle # 79 FOLFIRI + Avastin was on 11/12/2019. CEA 4.3 on 11/12/2019. Cycle # 80 FOLFIRI + Avastin was on 12/10/2019. CEA 4.0 on 12/09/2019.   CT chest 12/19/2019 Stable 3 mm nodule within the left upper lobe, similar to the previous studies dating back to 11/2/2018, however decreased in size compared to the CT from 3/26/2018. No thoracic LN. CT abdomen/pelvis 12/19/2019 Previously described small hypodense lesions are more conspicuous on the current study compared to the recent study throughout the liver from 8/20/2019, however similar to the prior study from 2/21/2019. Findings may be related to differences in contrast opacification. No abdominal or pelvic lymphadenopathy. Continue FOLFIRI + Avastin and repeat scans in 2 months to f/u on liver lesions. Cycle # 81 FOLFIRI + Avastin was on 01/07/2020. CEA 3.8 on 01/06/2020. Cycle # 82 FOLFIRI + Avastin was on 01/21/2020. CEA 3.7 on 01/20/2020. Cycle # 83 FOLFIRI + Avastin was on 02/04/2020. CEA 4.1 on 02/03/2020. Cycle # 84 FOLFIRI + Avastin was on 02/18/2020. CEA 4.6 on 02/17/2020. EGD by Dr. Eber Wang 03/02/2020 showing no masses or lesions  Cycle # 85 FOLFIRI + Avastin was on 03/03/2020. CEA 7.4 on 03/02/2020. Cycle # 86 FOLFIRI + Avastin was on 03/17/2020. CEA 4.5 on 03/16/2020. Colonoscopy on 03/23/2020 by Dr. Eber Wang unremarkable (records requested). Cycle # 87 FOLFIRI + Avastin was on 03/31/2020. CEA 4.1 on 03/30/2020. Cycle # 88 FOLFIRI + Avastin was on 04/21/2020. CEA 6.4 on 04/20/2020. Cycle # 89 FOLFIRI + Avastin was on 05/05/2020. CEA 5.8 on 05/04/2020. Cycle # 90 FOLFIRI + Avastin was on 06/02/2020. CEA 5.5 on 06/01/2020. Cycle # 91 FOLFIRI + Avastin was on 06/16/2020. CEA 5.6 on 06/15/2020. CT chest 06/23/2020 noted no metastatic disease in the chest.   CT abdomen/pelvis 06/23/2020 Stable small liver lesions measuring up to 5 mm since 2019.   22 mm round mass in segment 8 of the liver with central high density stable from December 2019), previously measuring up to 34 mm in 2017. No additional metastatic disease in the abdomen or pelvis. Small amount of ascites. Overall stable disease. Continue FOLFIRI + Avastin and repeat scans in 2-3 months. Cycle # 92 FOLFIRI + Avastin was on 07/07/2020.  CEA 5.7 on 07/06/2020. Cycle # 93 FOLFIRI + Avastin was on 07/21/2020. CEA 5.9 on 07/20/2020. Cycle # 94 FOLFIRI + Avastin was on 08/04/2020. CEA 5.5 on 08/04/2020. Cycle # 95 FOLFIRI + Avastin was on 08/25/2020. CEA 6.1 on 08/24/2020. CT chest 9/2/2020 stable unremarkable enhanced CT of the thorax. There is no metastatic disease to the lungs. CT abdomen pelvis on 9/2/2020 stable 2.2 cm low attenuated lesion within the central aspect of the right hepatic lobe favored to represent treated metastatic disease. No new hepatic lesion is identified. Stable splenomegaly  There is no appreciable colonic lesion or stricture  Pericholecystic fluid of uncertain etiology. General surgery team consulted. Laparoscopic cholecystectomy with normal cholangiogram 10/27/20  Gallbladder: Chronic cholecystitis and cholelithiasis. Unremarkable regional lymph node. 11/13/2020; Seen by Dr. Phil Amato from General surgery team; cleared to re-start chemotherapy. Cycle # 96 FOLFIRI + Avastin was on 11/17/2020. CEA 3.6 on 11/16/2020. Cycle # 97 FOLFIRI + Avastin was on 12/01/2020. CEA 3.5 on 11/30/2020. Cycle # 98 FOLFIRI + Avastin was on 12/15/2020. CEA 4.3 on 12/15/2020. Cycle # 99 FOLFIRI + Avastin was on 01/05/2021. CEA 4.7 on 01/04/2021. CT chest 01/13/2021 negative for metastatic disease. CT abdomen/pelvis 01/13/2021 Unchanged central hepatic lesion. No specific signs of abdominopelvic metastatic disease. Continue FOLFIRI + Avastin and repeat scans in 3 months. Cycle # 100 FOLFIRI + Avastin was on 01/19/2021. CEA 4.7 on 01/18/2021. Cycle # 101 FOLFIRI + Avastin was on 02/02/2021. CEA 5.2 on 02/01/2021. Cycle # 102 FOLFIRI + Avastin was on 02/16/2021. CEA 5.6 on 02/15/2021. Cycle # 103 FOLFIRI + Avastin was on 03/02/2021. Cycle # 104 FOLFIRI (20% dose reduced due to significant toxicity) + Avastin was on 03/16/2021. Cycle # 105 FOLFIRI (same dose as cycle # 104) + Avastin was on 03/30/2021.  CEA 6.5 on 03/29/2021. Cycle # 106 FOLFIRI (same dose as cycle # 104) + Avastin was on 04/13/2021. CEA 6.0 on 04/12/2021  CT chest 04/20/2021 no evidence of metastatic disease. CT abdomen/pelvis 04/20/2021 No evidence of progressive metastatic disease. Stable 2 cm lesion in the right lobe of the liver, likely representing treated metastatic disease. Imaging reviewed. Continue FOLFIRI + Avastin and repeat scans in 3 months  Cycle # 107 FOLFIRI + Avastin was on 04/27/2021  Cycle # 108 FOLFIRI + Avastin was on 05/11/2021. Cycle # 109 FOLFIRI (held Avastin due to upcoming surgery) on 05/25/2021. CEA 6.3 on 5/24/21  Cycle # 110 FOLFIRI (held Avastin due to upcoming surgery) on 06/08/2021. CEA 6.3 on 6/07/21. Right inguinal/scrotol hernia repair on 07/13/2021 with Dr Erum Gonzalez with folely removal on Friday 07/23/2021. He developed fever on 07/24/2021 and was brought to [address] [place], [place] [address] ER via EMS; Sepsis secondary to urinary tract infection   Completed Abx  CEA 4.1 on 08/30/2021  Cycle # 111 FOLFIRI was on 08/31/2021. HBP team for evaluation of liver lesions; His lesion does appear to be a hemangioma as his prior metastatic deposits in his liver were hypo attenuating compared to this CT which is hyper attenuating. compared to his prior CT scans he has disappearing liver metastasis. MRI liver on 09/17/2021 No suspicious liver lesion identified. Colonoscopy GI Dr. Romayne Ceo on 09/20/2021 unremarkable  CEA 3.3 on 09/27/2021. CEA 3.2 on 10/25/2021. CEA 2.7 on 11/22/2021. CEA 2.5 on 12/27/2021. CEA 2.4 on 01/24/2022. CEA 2.3 on 02/21/2022. CEA 2.4 on 03/21/2022. CEA 2.2 on 04/18/2022  CEA 2.2 on 05/16/2022  CEA 2.3 on 06/06/2022  HBP note 06/14/2022 reviewed. No evidence of recurrence on repeat imaging.   CEA 2.4 on 07/02/2022  CEA 2.3 on 08/01/2022  CEA 2.1 on 08/29/2022    CT chest 09/21/2022: No evidence of metastatic disease  CT abdomen/pelvis 09/21/2022: Previously measured areas in the liver are essentially unchanged and indeterminate with no significant hypermetabolism on recent PET-CT. No new hepatic lesion. No evidence for recurrent or metastatic disease within the abdomen or pelvis. CEA 2.1 on 09/26/2022. CEA 1.7 on 10/24/2022. CEA 2.3 on 11/21/2022. CEA 2.3 on 12/19/2022. CT chest/abdomen/pelvis 01/20/2023: Stable examination with no evidence of metastatic disease or recurrence. There are low-attenuation areas seen within the superior aspect of the liver are unchanged. CEA 2.7 on 01/20/2023  CEA 2.3 on 02/20/2023  Today 02/21/2023; No fever, chills. Fair appetite and energy level. No chest pain or SOB. No N.V. Intermittent arthralgias. Review of Systems;  CONSTITUTIONAL: No fever, chills. Fair appetite and energy level  ENMT: Eyes: No diplopia; Nose: No epistaxis. Mouth:No lesions  RESPIRATORY: No hemoptysis, shortness of breath. CARDIOVASCULAR: No chest pain, palpitations. GASTROINTESTINAL:No N/V, abdominal pain. GENITOURINARY: No dysuria, urinary frequency, hematuria. MSK: Intermittent Arthralgias  NEURO: No syncope, presyncope, headache. Remainder ROS: Negative. Past Medical History:   Past Medical History               Diagnosis Date    Arthritis      Cancer ([facility] [address]         colon    Depression      Hyperlipidemia      Hypertension           Medications:  Reviewed and reconciled. Allergies: Allergies   Allergen Reactions    Neosporin [Neomycin-Polymyxin-Gramicidin] Rash    Tape Felipe Addie Tape] Rash      Physical Exam:  BP (!) 144/72   Pulse 57   Temp 98.2 °F (36.8 °C)   Ht 6' 1\" (1.854 m)   Wt 234 lb 12.8 oz (106.5 kg)   SpO2 99%   BMI 30.98 kg/m²   GENERAL: Alert, oriented x 3, not in distress  HEENT: PERRLA, EOMI. No oral lesions   LUNGS: CTA Evaristo  CVS:RRR  EXTREMITIES: Without clubbing, cyanosis  NEUROLOGIC: No focal deficits.    ECOG PS 1    Diagnostics:  Lab Results   Component Value Date    WBC 6.2 02/20/2023    HGB 12.8 02/20/2023    HCT 37.9 02/20/2023    MCV 88.6 02/20/2023  02/20/2023     Lab Results   Component Value Date     02/20/2023    K 4.1 02/20/2023     02/20/2023    CO2 22 02/20/2023    BUN 19 02/20/2023    CREATININE 1.3 (H) 02/20/2023    GLUCOSE 121 (H) 02/20/2023    CALCIUM 9.8 02/20/2023    PROT 7.7 02/20/2023    LABALBU 4.3 02/20/2023    BILITOT 0.8 02/20/2023    ALKPHOS 133 (H) 02/20/2023    AST 23 02/20/2023    ALT 20 02/20/2023    LABGLOM 58 02/20/2023    GFRAA >60 09/26/2022     Lab Results   Component Value Date    CEA 2.3 02/20/2023     Impression/Plan:  68 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 KRAS Mutation: Mutation detected. BRAF Mutation: Mutation not detected. 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 CT scan abdomen/pelvis on 10/26/2015 revealed small nodules at the lung bases, extensive liver lesions consistent with metastatic rectosigmoid cancer. CEA 3488 on 10/30/2015. Bone scan on 11/10/2015 noted no metastatic disease. CT chest on 11/10/2015 revealed Multiple pulmonary nodules in the upper and lower lobes consistent with metastatic disease; Hepatic metastasis also visualized. 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 Mediport was placed. Cycle # 1 of FOLFOX + Avastin was on 11/23/2015. CEA was 4555 on 11/23/2015. Cycle # 2 of FOLFOX + Avastin was on 12/08/2015. CEA was 3160 on 12/08/2015. Cycle # 3 of FOLFOX + Avastin was on 12/22/2015. CEA was 2516 on 12/21/2015. Cycle # 4 of FOLFOX + Avastin was on 01/05/2016. CEA was 2098 on 01/05/2015. Cycle # 5 of FOLFOX + Avastin was on 01/19/2016. CEA was 1511 on 01/05/2015.     -Bone scan on 01/26/2016 noted no metastatic disease. CT chest on 01/26/2016 revealed Significant response to treatment with no visible residual nodules. CT scan abdomen/pelvis on 01/26/2016 noted Interval decreased size of multiple masses in the liver compatible with treatment response.   Continue another 2 months of FOLFOX + Avastin and repeat scans. Cycle # 6 of FOLFOX + Avastin was on 02/02/2016.  on 02/02/2016. Cycle # 7 of FOLFOX + Avastin was on 02/16/2016.  on 02/16/2016. Cycle # 8 of FOLFOX + Avastin was on 03/01/2016.  on 03/01/2016. Cycle # 9 of FOLFOX + Avastin was on 03/15/2016.  on 03/15/2016. Cycle # 10 of FOLFOX + Avastin was on 03/29/2016. .4 on 03/29/2016. Cycle # 11 of FOLFOX + Avastin was on 04/12/2016. .4 on 04/12/2016. Re-staging scans on 04/19/2016: CT Chest: clear lungs; no evidence of recurring pulmonary nodule; CT Abdomen/Pelvis: Further interval decrease in size of the multiple metastatic hepatic lesions, the largest lesion now measures 3.9 x 3.5 cm and previously measured 4.5 cm in maximum diameter. No mesenteric lymphadenopathy is identified; Bone Scan: No evidence of osseous metastasis. Continue same regimen and re-stage in 2-3 months. Cycle # 12 FOLFOX + Avastin was on 04/26/2016. .5 on 04/26/2016. Cycle # 13 FOLFOX + Avastin was on 05/10/2016. .3 on 05/10/2016. Cycle # 14 FOLFOX+AVASTIN was on 05/24/2016. .5 on 05/24/2016. Cycle # 15 FOLFOX + Avastin was on 06/07/2016. CEA 90.5 on 06/07/2016. Admitted to [facility] 06/13/2016-06/16/2016 for abdominal pain: EGD noted 1.5 cm clean based duodenal bulb ulceration s/p epinephrine and bicap per Dr. Felix Bobby. No active bleeding. A. Stomach, biopsy: Mild chronic gastritis, immunostain negative for Helicobacter  B. Esophagus, biopsy: Gastric glandular mucosa with prominent ntestinal metaplasia (Mad[place]'s epithelium), negative for epithelial dysplasia, esophageal squamous mucosa not identified     Cycle # 16 FOLFOX (discontinued avastin (bevacizumab) given association of peptic ulcer disease and known association of GI perforation.) was on 06/21/2016. CEA 47 on 06/21/2016. Cycle # 17 FOLFOX was on 07/05/2016. CEA 52.6 on 07/05/2016. CEA 47.6 on 07/19/2016.      Re-staging scans 07/19/2106: CT Chest negative for metastatic disease. CT Abdomen/Pelvis: Stable hepatic lesions; Question new mural thickening in the cecum. Bone Scan: New Let hip lesion suspicious for bone metastasis. Increased CEA; new mural thickening in the cecum and new left hip lesion suspicious for bone metastasis are consistent with disease progression; He derived maximum benefit from FOLFOX/AVASTIN. D/C FOLFOX/AVASTIN. We recommended FOLFIRI second line therapy. Cycle # 1 FOLFIRI was on 08/02/2016. CEA 40.8 on 08/02/2016. Xgeva q4 weeks started on 08/02/2016. Cycle # 2 FOLFIRI was on 08/16/2016. CEA 31.7 on 08/16/2016. Cycle # 3 FOLFIRI (added Avastin) was on 08/30/2016 given that ulcers healed on EGD 08/15/2016 by Dr. Marita Lorenz; Protonix bid since avastin re-started. Colonoscopy on 08/29/2016 (to look at the cecal area) unremarkable. CEA 26.7 on 08/30/2016. Cycle # 4 FOLFIRI/Avastin was on 09/13/2016. CEA 23.4 on 09/13/2016. Cycle # 5 FOLFIRI/Avastin was on 09/27/2016. CEA 22.3 on 09/27/2016. Cycle # 6 FOLFIRI/Avastin was on 10/11/2016. CEA 18.4 on 10/11/2016. Bone scan 10/21/2016 stable bone metastasis; ? New lesion anterior left sixth rib likely interval healing fracture. CT abdomen/pelvis revealed stable hepatic metastasis. CT chest negative for metastatic disease. Continue FOLFIRI/Avastin and repeat scans in 3 months. Cycle # 7 FOLFIRI/Avastin was on 10/25/2016. CEA 16.1 on 10/25/2016. Cycle # 8 FOLFIRI/Avastin was on 11/08/2016. CEA 15.7 on 11/08/2016. Cycle # 9 FOLFIRI/Avastin was on 11/29/2016. CEA 13.6 on 11/29/2016. Cycle # 10 FOLFIRI/Avastin was on 12/13/2016. CEA 10.6 on 12/13/2016. Cycle # 11 FOLFIRI/Avastin was on 12/27/2016. CEA 11.1 on 12/27/2016. Cycle # 12 FOLFIRI/Avastin was on 01/10/2017. CEA 9.7 on 01/10/2017. CT chest 01/23/2017 No new pulmonary nodule or lymphadenopathy.  Patchy groundglass opacities within the left lower lobe, suggestive of infectious or inflammatory etiology. Followup CT chest in 3 months. Slight increased linear sclerosis along the left anterior sixth rib corresponding to an area of increased uptake on the prior bone scan from 10/21/2016, favored to be related to interval healing changes of a nondisplaced fracture. CT abdomen/pelvis on 01/23/2017 Redemonstration of multiple hepatic metastases, which are similar compared to the prior CT from 10/21/2016. Redemonstration of area of increased sclerosis along the right acetabulum suspicious for metastatic disease. Bone scan on 01/23/2017 Stable subtle uptake within the left proximal diaphysis, again suggestive of metastatic disease  Decreased subtle uptake within the medial right acetabulum. Decreased uptake within the left anterior sixth rib corresponding to linear sclerosis on the recent CT, favored to be related to an joint rib fracture. Continue FOLFIRI + Avastin and repeat scans in 3 months. Cycle # 13 FOLFIRI + Avastin was on 01/24/2017. Cycle # 14 FOLFIRI + Avastin was on 02/07/2017. Cycle # 15 FOLFIRI + Avastin was on 02/21/2017. Cycle # 16 FOLFIRI + Avastin was on 03/07/2017. Cycle # 17 FOLFIRI + Avastin was on 03/21/2017. Cycle # 18 FOLFIRI + Avastin was on 04/04/2017. CT chest 04/17/2017 negative for metastatic disease. CT abdomen/pelvis 04/17/2017 Stable hypodense metastatic lesions within the liver. Stable area of increased sclerosis along the right acetabulum  Bone scan 04/17/2017 Stable subtle uptake within the left proximal femoral diaphysis; No definite abnormal uptake in the region of a sclerotic lesion along the posterior column of the right acetabulum noted on CT. Stable slight uptake within the left anterior sixth rib corresponding to linear sclerosis on the recent CT, favored to be related to a fracture. Continue FOLFIRI + Avastin and repeat scans in 3 months. Cycle # 19 FOLFIRI + Avastin was on 04/18/2017. CEA 7.5 on 04/18/2017.   Cycle # 20 FOLFIRI + Avastin was on 05/02/2017. CEA 7.7 on 05/02/2017. Cycle # 21 FOLFIRI + Avastin was on 05/16/2017. CEA 7.1 on 05/16/2017. Cycle # 22 FOLFIRI + Avastin was on 05/30/2017. CEA 8.2 on 05/30/2017. Cycle # 23 FOLFIRI + Avastin was on 06/13/2017. CEA 7.7 on 06/13/2017. Cycle # 24 FOLFIRI + Avastin was on 06/27/2017. CEA 6.6 on 06/27/2017. Re-staging scans 07/05/2017:  Bone scan stable proximal left femoral diaphysis, right acetabulum, and left anterior sixth rib lesions;  CT abdomen/pelvis stable hypodense metastatic lesions within the liver; CT chest without convincing evidence of metastatic disease. Cycle # 25 FOLFIRI + Avastin was on 07/11/2017. CEA 6.3 on 07/11/2017. Cycle # 26 FOLFIRI + Avastin was on 07/25/2017. CEA 7.3 on 07/25/2017. Cycle # 27 FOLFIRI + Avastin was on 08/08/2017. CEA 6.5 on 08/08/2017. Cycle # 28 FOLFIRI + Avastin was on 08/22/2017. CEA 5.9 on 08/22/2017. Cycle # 29 FOLFIRI + Avastin was on 09/05/2017. CEA 6.3 on 09/05/2017. Cycle # 30 FOLFIRI + Avastin was on 09/19/2017. CEA 6.3 on 09/19/2017. Bone scan 09/26/2017 stable. CT abdomen/pelvis on 09/26/2017 Stable hypodense mass in the liver. Stable sclerotic lesion in the acetabulum. CT chest 09/26/2017 negative for metastatic disease. Cycle # 31 FOLFIRI + Avastin was on 10/03/2017. CEA 7.4 on 10/03/2017. Cycle # 32 FOLFIRI + Avastin was on 10/17/2017. CEA 6.0 on 10/17/2017. Cycle # 33 FOLFIRI + Avastin was on 10/31/2017. CEA 6.8 on 10/31/2017. Cycle # 34 FOLFIRI + Avastin was on 11/14/2017. CEA 7.2 on 11/14/2017. Cycle # 35 FOLFIRI + Avastin was on 11/28/2017. CEA 5.1 on 11/28/2017. Cycle # 36 FOLFIRI + Avastin was on 12/12/2017. CEA 6.1 on 12/12/2017. Bone scan 12/22/2017 noted no evidence of metastatic disease to the axial or appendicular skeleton. CT chest 12/22/2017 noted no evidence of metastatic disease. CT abdomen/pelvis 12/22/2017 noted stable hypodense lesions in the liver.   Continue FOLFIRI + Avastin and repeat scans in 3 months. Cycle # 37 FOLFIRI + Avastin was on 12/27/2018. CEA 6.7 on 12/27/2017. Cycle # 38 FOLFIRI + Avastin was on 01/09/2018. CEA 5.0 on 01/09/2018. Cycle # 39 FOLFIRI + Avastin was on 01/23/2018. CEA 6.5 on 01/23/2018. Cycle # 40 FOLFIRI + Avastin was on 02/06/2018. CEA 6.9 on 02/06/2018. Cycle # 41 FOLFIRI + Avastin was on 02/20/2018. CEA 6.4 on 02/20/2018. Cycle # 42 FOLFIRI + Avastin was on 03/06/2018. CEA 6.6 on 03/06/2018. Cycle # 43 FOLFIRI + Avastin was on 03/20/2018. CEA 5.7 on 03/20/2018. Bone scan on 03/26/2018 negative for metastatic disease. CT chest 03/26/2018 noted ? new 7 mm nodule in LUCY. CT abdomen/pelvis 03/26/2018 noted stable hypodense lesions in the liver. ? New small ascites in the abdomen. Patient wants to continue to monitor the lung finding and repeat scans in 2 months instead of changing the current chemotherapy regimen to oral Lonsurf. Cycle # 44 FOLFIRI + Avastin was on 04/03/2018. CEA 6.3 on 04/03/2018. Cycle # 45 FOLFIRI + Avastin was on 04/24/2018. CEA 5.3 on 04/24/2018. Cycle # 46 FOLFIRI + Avastin was on 05/08/2018. CEA 5.4 on 05/08/2018. Cycle # 47 FOLFIRI + Avastin was on 05/22/2018. CEA 6.1 on 05/22/2018. CT chest 05/31/2018 noted stable nodule in LUCY. No evidence of worsening malignancy. CT abdomen/pelvis on 05/31/2018 noted stable liver metastasis. No evidence of worsening malignancy. Continue FOLFIRI + Avastin and repeat scans in 3 months. Cycle # 48 FOLFIRI + Avastin was on 06/06/2018. CEA 6.3 on 06/05/2018. Cycle # 49 FOLFIRI + Avastin was on 06/19/2018. CEA 6.1 on 06/19/2018. Cycle # 50 FOLFIRI + Avastin was on 07/03/2018. CEA 7.4 on 07/03/2018. Cycle # 51 FOLFIRI + Avastin was on 07/24/2018. CEA 6.7 on 07/24/2018. Cycle # 52 FOLFIRI + Avastin was on 08/14/2018. CEA 6.8 on 08/14/2018. Cycle # 53 FOLFIRI + Avastin was on 08/28/2018. CEA 6.5 on 08/27/2018.   CT chest 09/06/2018 noted interval decrease in size of LUCY measuring up to 4 mm, suggestive of treatment response. No mediastinal or hilar LN  CT abdomen/pelvis 09/06/2018 Slight interval increase in size of a previously noted metastatic lesion within the right hepatic lobe. This may be related to differences in phase of enhancement compared to the most recent study from 5/31/2018, the lesion remains decreased in size compared to the more previous studies. No abdominal, retroperitoneal, or pelvic lymphadenopathy. Continue FOLFIRI + Avastin and repeat scans in 2 months. Cycle # 54 FOLFIRI + Avastin was on 09/11/2018. CEA 6.4 on 09/10/2018. Cycle # 55 FOLFIRI + Avastin was on 09/25/2018. CEA 6.4 on 09/25/2018. Cycle # 56 FOLFIRI + Avastin was on 10/09/2018. CEA 6.7 on 10/08/2018. Cycle # 57 FOLFIRI + Avastin was on 10/23/2018. CEA 7.2 on 10/22/2018. CT chest 11/02/2018 stable 3 mm nodule within LUCY. No new right and left lung nodule. No mediastinal or osseous lesion. CT abdomen/pelvis 11/02/2018 stable metastatic disease to liver. No new metastatic disease identified. No mesenteric or retroperitoneal LN. No gross colonic lesion identified. Continue FOLFIRI + Avastin and repeat scans in 3 months. Cycle # 58 FOLFIRI + Avastin was on 11/06/2018. CEA 7.6 on 11/05/2018. Cycle # 59 FOLFIRI + Avastin was on 11/27/2018. CEA 6.9 on 11/26/2018. Cycle # 60 FOLFIRI + Avastin was on 12/11/2018. CEA 6.7 on 12/11/2018. Cycle # 61 FOLFIRI + Avastin was on 01/08/2019. CEA 5.6 on 01/07/2019. Cycle # 62 FOLFIRI + Avastin was on 01/29/2019. CEA 4.6 on 01/28/2019. Cycle # 63 FOLFIRI + Avastin was on 02/12/2019. CEA 4.3 on 02/11/2019. CT chest 02/21/2019 no evidence of metastatic disease. CT abdomen/pelvis 02/21/2019 stable hypodense liver lesions. No evidence of worsening metastatic disease. 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  Ordered MRI thoracic spine and referred to neurosurgery team.  Cycle # 64 FOLFIRI + Avastin was on 02/26/2019. CEA 4.7 on 02/25/2019. Cycle # 65 FOLFIRI + Avastin was on 03/12/2019. CEA 4.0 on 03/11/2019. Cycle # 66 FOLFIRI + Avastin was on 03/26/2019. CEA 4.7 on 03/25/2019. Cycle # 67 FOLFIRI + Avastin was on 04/09/2019. CEA 5.6 on 04/08/2019. Cycle # 68 FOLFIRI + Avastin was on 04/30/2019. CEA 4.9 on 04/29/2019. Cycle # 69 FOLFIRI + Avastin was on 05/14/2019. CEA 5.3 on 05/14/2019. CT chest 05/23/2019 stable small lung nodule with no evidence of metastatic disease. CT abdomen/pelvis 05/23/2019 Decrease conspicuity of the liver lesions. No new lesions seen. Stable splenomegaly. Continue FOLFIRI + Avastin and repeat scans in 3 months. Cycle # 70 FOLFIRI + Avastin was on 06/04/2019. CEA 4.8 on 06/03/2019. Cycle # 71 FOLFIRI + Avastin was on 06/18/2019. CEA 4.6 on 06/17/2019. Cycle # 72 FOLFIRI + Avastin was on 07/09/2019. CEA 4.3 on 07/08/2019. Cycle # 73 FOLFIRI + Avastin was on 07/30/2019. CEA 5.0 on 07/29/2019. Cycle # 74 FOLFIRI + Avastin was on 08/13/2019. CEA 5.0 on 08/12/2019. CT chest 08/20/2019 negative  CT abdomen/pelvis 08/20/2019 Previous identified hepatic lesions are not visualized on the current exam.  Continue FOLFIRI + Avastin and repeat scans in 3 months. Cycle # 75 FOLFIRI + Avastin was on 08/27/2019. CEA 5.0 on 08/26/2019. Cycle # 76 FOLFIRI + Avastin was on 09/17/2019. CEA 5.5 on 09/16/2019. Cycle # 77 FOLFIRI + Avastin was on 10/15/2019. CEA 4.6 on 10/15/2019. Cycle # 78 FOLFIRI + Avastin was on 10/29/2019. CEA 4.1 on 10/28/2019. Cycle # 79 FOLFIRI + Avastin was on 11/12/2019. CEA 4.3 on 11/12/2019. Cycle # 80 FOLFIRI + Avastin was on 12/10/2019. CEA 4.0 on 12/09/2019. CT chest 12/19/2019 Stable 3 mm nodule within the left upper lobe, similar to the previous studies dating back to 11/2/2018, however decreased in size compared to the CT from 3/26/2018. No thoracic LN.    CT abdomen/pelvis 12/19/2019 Previously described small hypodense lesions are more conspicuous on the current study compared to the recent study throughout the liver from 8/20/2019, however similar to the prior study from 2/21/2019. Findings may be related to differences in contrast opacification. No abdominal or pelvic lymphadenopathy. Continue FOLFIRI + Avastin and repeat scans in 2 months to f/u on liver lesions. Cycle # 81 FOLFIRI + Avastin was on 01/07/2020. CEA 3.8 on 01/06/2020. Cycle # 82 FOLFIRI + Avastin was on 01/21/2020. CEA 3.7 on 01/20/2020. Cycle # 83 FOLFIRI + Avastin was on 02/04/2020. CEA 4.1 on 02/03/2020. Cycle # 84 FOLFIRI + Avastin was on 02/18/2020. CEA 4.6 on 02/17/2020. CT chest 2/28/2020 no evidence of metastatic disease to the lungs and no mediastinal or hilar adenopathy. CT abdomen pelvis 2/28/2020 no enhancing lesions seen within the liver to suggest metastatic disease. Wall thickening of the rectosigmoid junction. Images reviewed. Continue FOLFIRI Avastin and repeat scans in 3 months  EGD by Dr. Romayne Ceo 03/02/2020 showing no masses or lesions. Cycle # 85 FOLFIRI + Avastin was on 03/03/2020. CEA 7.4 on 03/02/2020. Cycle # 86 FOLFIRI + Avastin was on 03/17/2020. CEA 4.5 on 03/16/2020. Colonoscopy on 03/23/2020 by Dr. Romayne Ceo unremarkable (records requested). Cycle # 87 FOLFIRI + Avastin was on 03/31/2020. CEA 4.1 on 03/30/2020. Cycle # 88 FOLFIRI + Avastin was on 04/21/2020. CEA 6.4 on 04/20/2020. Cycle # 89 FOLFIRI + Avastin was on 05/05/2020. CEA 5.8 on 05/04/2020. Cycle # 90 FOLFIRI + Avastin was on 06/02/2020. CEA 5.5 on 06/01/2020. Cycle # 91 FOLFIRI + Avastin was on 06/16/2020. CEA 5.6 on 06/15/2020. CT chest 06/23/2020 noted no metastatic disease in the chest.   CT abdomen/pelvis 06/23/2020 Stable small liver lesions measuring up to 5 mm since 2019.   22 mm round mass in segment 8 of the liver with central high density stable from December 2019), previously measuring up to 34 mm in 2017.   No additional metastatic disease in the abdomen or pelvis. Small amount of ascites. Overall stable disease. Continue FOLFIRI + Avastin and repeat scans in 2-3 months. Cycle # 92 FOLFIRI + Avastin was on 07/07/2020. CEA 5.7 on 07/06/2020. Cycle # 93 FOLFIRI + Avastin was on 07/21/2020. CEA 5.9 on 07/20/2020. Cycle # 94 FOLFIRI + Avastin was on 08/04/2020. CEA 5.5 on 08/04/2020. Cycle # 95 FOLFIRI + Avastin was on 08/25/2020. CEA 6.1 on 08/24/2020. CT chest 9/2/2020 stable unremarkable enhanced CT of the thorax. There is no metastatic disease to the lungs. CT abdomen pelvis on 9/2/2020 stable 2.2 cm low attenuated lesion within the central aspect of the right hepatic lobe favored to represent treated metastatic disease. No new hepatic lesion is identified. Stable splenomegaly  There is no appreciable colonic lesion or stricture. Pericholecystic fluid of uncertain etiology. Laparoscopic cholecystectomy with normal cholangiogram 10/27/20  Gallbladder: Chronic cholecystitis and cholelithiasis. Unremarkable regional lymph node. 11/13/2020; Seen by Dr. Jeanine Pelletier from General surgery team; cleared to re-start chemotherapy. Cycle # 96 FOLFIRI + Avastin was on 11/17/2020. CEA 3.6 on 11/16/2020. Cycle # 97 FOLFIRI + Avastin was on 12/01/2020. CEA 3.5 on 11/30/2020. Cycle # 98 FOLFIRI + Avastin was on 12/15/2020. CEA 4.3 on 12/15/2020. Cycle # 99 FOLFIRI + Avastin was on 01/05/2021. CEA 4.7 on 01/04/2021. CT chest 01/13/2021 negative for metastatic disease. CT abdomen/pelvis 01/13/2021 Unchanged central hepatic lesion. No specific signs of abdominopelvic metastatic disease. Continue FOLFIRI + Avastin and repeat scans in 3 months. Cycle # 100 FOLFIRI + Avastin was on 01/19/2021. CEA 4.7 on 01/18/2021. Cycle # 101 FOLFIRI + Avastin was on 02/02/2021. CEA 5.2 on 02/01/2021. Cycle # 102 FOLFIRI + Avastin was on 02/16/2021. CEA 5.6 on 02/15/2021. Cycle # 103 FOLFIRI + Avastin was on 03/02/2021.   Cycle # 104 FOLFIRI (20% dose reduced due to significant toxicity) + Avastin was on 03/16/2021. Cycle # 105 FOLFIRI (same dose as cycle # 104) + Avastin was on 03/30/2021. CEA 6.5 on 03/29/2021. Cycle # 106 FOLFIRI (same dose as cycle # 104) + Avastin was on 04/13/2021. CEA 6.0 on 04/12/2021  CT chest 04/20/2021 no evidence of metastatic disease. CT abdomen/pelvis 04/20/2021 No evidence of progressive metastatic disease. Stable 2 cm lesion in the right lobe of the liver, likely representing treated metastatic disease. Imaging reviewed. Continue FOLFIRI + Avastin and repeat scans in 3 months  Cycle # 107 FOLFIRI + Avastin was on 04/27/2021  Cycle # 108 FOLFIRI + Avastin was on 05/11/2021. CEA 6.4 on 05/10/2021. Cycle # 109 FOLFIRI (held Avastin due to upcoming surgery) on 05/25/2021. CEA 6.3 on 5/24/21  Cycle # 110 FOLFIRI (held Avastin due to upcoming surgery) on 06/08/2021. CEA 6.3 on 6/07/21. CEA 5.8 on 06/28/2021. Right inguinal/scrotol hernia repair on 07/13/2021 with Dr Krystal Boswell with folely removal on 07/23/2021. He developed fever on 07/24/2021 and was brought to [address] [place], [place] [address] ER via EMS; Sepsis secondary to urinary tract infection; Completed Abx  CT chest 08/13/2021 no sign of recurrent or metastatic disease. CT abdomen/pelvis 08/13/2021 unchanged hepatic lesions. Splenomegaly and secondary signs of portal venous hypertension  CEA 4.0 on 08/16/2021  CEA 4.1 on 08/30/2021  Cycle # 111 FOLFIRI was on 08/31/2021. HBP team for evaluation of liver lesions; His lesion does appear to be a hemangioma as his prior metastatic deposits in his liver were hypo attenuating compared to this CT which is hyper attenuating. compared to his prior CT scans he has disappearing liver metastasis. MRI liver on 09/17/2021 No suspicious liver lesion identified. Colonoscopy GI Dr. Avendano Creed on 09/20/2021 unremarkable  CEA 3.3 on 09/27/2021. CEA 3.2 on 10/25/2021. CT chest 11/08/2021: Similar chronic appearing findings.   No acute process or evidence of metastatic disease identified. CT Abdomen/pelvis 11/08/2021: No significant change in the appearance of the liver. Similar appearance of splenomegaly. No interval change. CEA 2.7 on 11/22/2021. CEA 2.5 on 12/27/2021  CEA 2.4 on 01/24/2022  CT chest/abdomen/pelvis 02/04/2022 Stable CT of the chest abdomen/ pelvis with the a 1.4 cm enhancing nodule in the right hepatic lobe which is marginally improved. Imaging reviewed. CEA 2.3 on 02/21/2022  CEA 2.4 on 03/21/2022  CEA 2.2 on 04/18/2022  CT chest abdomen pelvis 5/11/2022: progressive hepatic metastasis with increasing number and prominence of the hepatic lesions  Soft tissue density in the ileocecal valve probably fecal matter. CEA 2.2 on 05/16/2022  CEA 2.3 on 06/06/2022  PET/CT scan 06/06/2022 the recently described hepatic lesions on CT are not significantly hypermetabolic on PET relative to hepatic background activity. No generalized pattern of FDG avid metastatic disease  Imaging reviewed. HBP noted on 06/14/2022 reviewed; No evidence of any recurrence on repeat imaging. Evaluated by Dr. María Carrillo on 06/20/2022  CEA 2.4 on 07/02/2022  Colonoscopy on July 11, 2022 noted colon polyp x1 in the descending colon removed by cold snare polypectomy  Mild diverticulosis of the sigmoid colon  Previous cancer site and spot tattoo near the rectosigmoid junction with no gross recurrence in the lumen of the colon with biopsies of the mucosa taken  Small internal and external hemorrhoids  A. Descending colon polyp polypectomy: Tubular adenoma  B. Previous cancer SPOT biopsy:   Colonic mucosa with no significant pathologic changes  Negative for malignancy  Recommend follow-up colonoscopy in 2 years for surveillance  Operative note reviewed. Pathology reviewed.    CEA 2.3 on 08/01/2022  CEA 2.1 on 08/29/2022  CT chest 09/21/2022: No evidence of metastatic disease  CT abdomen/pelvis 09/21/2022: Previously measured areas in the liver are essentially unchanged and indeterminate with no significant hypermetabolism on recent PET-CT. No new hepatic lesion. No evidence for recurrent or metastatic disease within the abdomen or pelvis. CEA 2.1 on 09/26/2022. CEA 1.7 on 10/24/2022. CEA 2.3 on 11/21/2022  CEA 2.3 on 12/19/2022. CT chest/abdomen/pelvis 01/20/2023 stable examination with no evidence of metastatic disease or recurrence. New low-attenuation areas seen within the superior aspect of the liver are unchanged. There are no new findings. CEA 2.7 on 01/20/2023  CEA 2.3 on 02/20/2023  Labs reviewed; Creat 1.3; recommended to increase p.o. fluid intake. Port flush today 02/21/2023    RTC 1 month with labs   MSI testing noted no mismatch repair protein loss of expression  NGS testing ([employer])   MS-Stable: No therapies or clinical trials  TMB 3Muts/Mb: No therapies or clinical trials  KRAS [id number]: Therapies with clinical relevance in patient's tumor type: Cetuximab, Panitumumab  Therapies with clinical relevance in other tumor type: None  NRAS wild type:  Therapies with clinical relevance in patient's tumor type: Cetuximab, Panitumumab  Therapies with clinical relevance in other tumor type: None  APC: None None  MARIELA: None None    2/21/2023  Cathy Angel MD  Board Certified Medical Oncologist

## 2023-03-20 ENCOUNTER — HOSPITAL ENCOUNTER (OUTPATIENT)
Age: 73
Discharge: HOME | End: 2023-03-20
Payer: MEDICARE

## 2023-03-20 DIAGNOSIS — C20 RECTAL CANCER (HCC): ICD-10-CM

## 2023-03-20 LAB
ALBUMIN SERPL-MCNC: 4.1 G/DL (ref 3.5–5.2)
ALP SERPL-CCNC: 135 U/L (ref 40–129)
ALT SERPL-CCNC: 16 U/L (ref 0–40)
ANION GAP SERPL CALCULATED.3IONS-SCNC: 10 MMOL/L (ref 7–16)
AST SERPL-CCNC: 20 U/L (ref 0–39)
BASOPHILS # BLD: 0.04 E9/L (ref 0–0.2)
BASOPHILS NFR BLD: 0.6 % (ref 0–2)
BILIRUB SERPL-MCNC: 0.4 MG/DL (ref 0–1.2)
BUN SERPL-MCNC: 23 MG/DL (ref 6–23)
CALCIUM SERPL-MCNC: 9.5 MG/DL (ref 8.6–10.2)
CEA SERPL-MCNC: 2.2 NG/ML (ref 0–5.2)
CHLORIDE SERPL-SCNC: 105 MMOL/L (ref 98–107)
CO2 SERPL-SCNC: 25 MMOL/L (ref 22–29)
CREAT SERPL-MCNC: 1.4 MG/DL (ref 0.7–1.2)
EOSINOPHIL # BLD: 0.16 E9/L (ref 0.05–0.5)
EOSINOPHIL NFR BLD: 2.3 % (ref 0–6)
ERYTHROCYTE [DISTWIDTH] IN BLOOD BY AUTOMATED COUNT: 13.1 FL (ref 11.5–15)
GLUCOSE SERPL-MCNC: 118 MG/DL (ref 74–99)
HCT VFR BLD AUTO: 38.5 % (ref 37–54)
HGB BLD-MCNC: 12.4 G/DL (ref 12.5–16.5)
IMM GRANULOCYTES # BLD: 0.05 E9/L
IMM GRANULOCYTES NFR BLD: 0.7 % (ref 0–5)
LYMPHOCYTES # BLD: 0.77 E9/L (ref 1.5–4)
LYMPHOCYTES NFR BLD: 11.2 % (ref 20–42)
MCH RBC QN AUTO: 29.8 PG (ref 26–35)
MCHC RBC AUTO-ENTMCNC: 32.2 % (ref 32–34.5)
MCV RBC AUTO: 92.5 FL (ref 80–99.9)
MONOCYTES # BLD: 0.39 E9/L (ref 0.1–0.95)
MONOCYTES NFR BLD: 5.7 % (ref 2–12)
NEUTROPHILS # BLD: 5.44 E9/L (ref 1.8–7.3)
NEUTS SEG NFR BLD: 79.5 % (ref 43–80)
PLATELET # BLD AUTO: 156 E9/L (ref 130–450)
PMV BLD AUTO: 10 FL (ref 7–12)
POTASSIUM SERPL-SCNC: 4.3 MMOL/L (ref 3.5–5)
PROT SERPL-MCNC: 7.5 G/DL (ref 6.4–8.3)
RBC # BLD AUTO: 4.16 E12/L (ref 3.8–5.8)
SODIUM SERPL-SCNC: 140 MMOL/L (ref 132–146)
WBC # BLD: 6.9 E9/L (ref 4.5–11.5)

## 2023-03-20 PROCEDURE — 82378 CARCINOEMBRYONIC ANTIGEN: CPT

## 2023-03-20 PROCEDURE — 36415 COLL VENOUS BLD VENIPUNCTURE: CPT

## 2023-03-20 PROCEDURE — 80053 COMPREHEN METABOLIC PANEL: CPT

## 2023-03-20 PROCEDURE — 85025 COMPLETE CBC W/AUTO DIFF WBC: CPT

## 2023-03-21 ENCOUNTER — HOSPITAL ENCOUNTER (OUTPATIENT)
Dept: INFUSION THERAPY | Age: 73
Discharge: HOME | End: 2023-03-21
Payer: MEDICARE

## 2023-03-21 ENCOUNTER — OFFICE VISIT (OUTPATIENT)
Dept: ONCOLOGY | Age: 73
End: 2023-03-21
Payer: MEDICARE

## 2023-03-21 VITALS
OXYGEN SATURATION: 98 % | BODY MASS INDEX: 31.3 KG/M2 | SYSTOLIC BLOOD PRESSURE: 161 MMHG | HEIGHT: 73 IN | DIASTOLIC BLOOD PRESSURE: 76 MMHG | HEART RATE: 58 BPM | TEMPERATURE: 98.8 F | WEIGHT: 236.2 LBS

## 2023-03-21 DIAGNOSIS — C20 RECTAL CANCER (HCC): Primary | ICD-10-CM

## 2023-03-21 DIAGNOSIS — C20 RECTAL CANCER METASTASIZED TO LIVER (HCC): Primary | ICD-10-CM

## 2023-03-21 DIAGNOSIS — C78.7 RECTAL CANCER METASTASIZED TO LIVER (HCC): Primary | ICD-10-CM

## 2023-03-21 PROCEDURE — 1123F ACP DISCUSS/DSCN MKR DOCD: CPT | Performed by: INTERNAL MEDICINE

## 2023-03-21 PROCEDURE — 96523 IRRIG DRUG DELIVERY DEVICE: CPT

## 2023-03-21 PROCEDURE — 99214 OFFICE O/P EST MOD 30 MIN: CPT

## 2023-03-21 PROCEDURE — 2580000003 HC RX 258: Performed by: INTERNAL MEDICINE

## 2023-03-21 PROCEDURE — 99213 OFFICE O/P EST LOW 20 MIN: CPT | Performed by: INTERNAL MEDICINE

## 2023-03-21 PROCEDURE — 6360000002 HC RX W HCPCS: Performed by: INTERNAL MEDICINE

## 2023-03-21 RX ORDER — SODIUM CHLORIDE 0.9 % (FLUSH) 0.9 %
10 SYRINGE (ML) INJECTION PRN
Status: DISCONTINUED | OUTPATIENT
Start: 2023-03-21 | End: 2023-03-22 | Stop reason: HOSPADM

## 2023-03-21 RX ORDER — WATER 1000 ML/1000ML
2.2 INJECTION, SOLUTION INTRAVENOUS ONCE
OUTPATIENT
Start: 2023-03-21 | End: 2023-03-21

## 2023-03-21 RX ORDER — SODIUM CHLORIDE 0.9 % (FLUSH) 0.9 %
10 SYRINGE (ML) INJECTION PRN
OUTPATIENT
Start: 2023-03-21

## 2023-03-21 RX ORDER — HEPARIN SODIUM (PORCINE) LOCK FLUSH IV SOLN 100 UNIT/ML 100 UNIT/ML
500 SOLUTION INTRAVENOUS PRN
Status: DISCONTINUED | OUTPATIENT
Start: 2023-03-21 | End: 2023-03-22 | Stop reason: HOSPADM

## 2023-03-21 RX ORDER — HEPARIN SODIUM (PORCINE) LOCK FLUSH IV SOLN 100 UNIT/ML 100 UNIT/ML
500 SOLUTION INTRAVENOUS PRN
OUTPATIENT
Start: 2023-03-21

## 2023-03-21 RX ADMIN — SODIUM CHLORIDE, PRESERVATIVE FREE 10 ML: 5 INJECTION INTRAVENOUS at 08:37

## 2023-03-21 RX ADMIN — Medication 500 UNITS: at 08:37

## 2023-03-21 NOTE — PROGRESS NOTES
are more conspicuous on the current study compared to the recent study throughout the liver from 8/20/2019, however similar to the prior study from 2/21/2019. Findings may be related to differences in contrast opacification. No abdominal or pelvic lymphadenopathy. Continue FOLFIRI + Avastin and repeat scans in 2 months to f/u on liver lesions. Cycle # 81 FOLFIRI + Avastin was on 01/07/2020. CEA 3.8 on 01/06/2020. Cycle # 82 FOLFIRI + Avastin was on 01/21/2020. CEA 3.7 on 01/20/2020. Cycle # 83 FOLFIRI + Avastin was on 02/04/2020. CEA 4.1 on 02/03/2020. Cycle # 84 FOLFIRI + Avastin was on 02/18/2020. CEA 4.6 on 02/17/2020. CT chest 2/28/2020 no evidence of metastatic disease to the lungs and no mediastinal or hilar adenopathy. CT abdomen pelvis 2/28/2020 no enhancing lesions seen within the liver to suggest metastatic disease. Wall thickening of the rectosigmoid junction. Images reviewed. Continue FOLFIRI Avastin and repeat scans in 3 months  EGD by Dr. Geovanni Maria 03/02/2020 showing no masses or lesions. Cycle # 85 FOLFIRI + Avastin was on 03/03/2020. CEA 7.4 on 03/02/2020. Cycle # 86 FOLFIRI + Avastin was on 03/17/2020. CEA 4.5 on 03/16/2020. Colonoscopy on 03/23/2020 by Dr. Geovanni Maria unremarkable (records requested). Cycle # 87 FOLFIRI + Avastin was on 03/31/2020. CEA 4.1 on 03/30/2020. Cycle # 88 FOLFIRI + Avastin was on 04/21/2020. CEA 6.4 on 04/20/2020. Cycle # 89 FOLFIRI + Avastin was on 05/05/2020. CEA 5.8 on 05/04/2020. Cycle # 90 FOLFIRI + Avastin was on 06/02/2020. CEA 5.5 on 06/01/2020. Cycle # 91 FOLFIRI + Avastin was on 06/16/2020. CEA 5.6 on 06/15/2020. CT chest 06/23/2020 noted no metastatic disease in the chest.   CT abdomen/pelvis 06/23/2020 Stable small liver lesions measuring up to 5 mm since 2019.   22 mm round mass in segment 8 of the liver with central high density stable from December 2019), previously measuring up to 34 mm in 2017.   No additional metastatic disease

## 2023-03-21 NOTE — PROGRESS NOTES
PORT FLUSH    Patient presents to clinic for [facility] today. single  SQ port accessed per policy using 58H, . 75 inch Campos needle for good blood return. Site flushed easily with 10 mL NSS followed by 5 mL Heparin solution 100 units/ml rinse prior to de-access. Dry sterile dressing to area. Tolerated procedure well. Encouraged to schedule port flush every 4 weeks. Patient tolerated infusion well. Patient alert and oriented x3. No distress noted. Patient denies any new or worsening pain. Patient educated on signs and symptoms of reaction to medication. Educated patient on possible side effects and treatment of medication. Patient verbalized understanding. Offered patient education an/or discharge material.  Patient declined. Patient denies any needs. All questions answered.

## 2023-05-01 ENCOUNTER — HOSPITAL ENCOUNTER (OUTPATIENT)
Age: 73
Discharge: HOME | End: 2023-05-01
Payer: MEDICARE

## 2023-05-01 DIAGNOSIS — C20 RECTAL CANCER (HCC): ICD-10-CM

## 2023-05-01 LAB
ALBUMIN SERPL-MCNC: 4.4 G/DL (ref 3.5–5.2)
ALP SERPL-CCNC: 138 U/L (ref 40–129)
ALT SERPL-CCNC: 18 U/L (ref 0–40)
ANION GAP SERPL CALCULATED.3IONS-SCNC: 10 MMOL/L (ref 7–16)
AST SERPL-CCNC: 21 U/L (ref 0–39)
BASOPHILS # BLD: 0.04 E9/L (ref 0–0.2)
BASOPHILS NFR BLD: 0.8 % (ref 0–2)
BILIRUB SERPL-MCNC: 0.6 MG/DL (ref 0–1.2)
BUN SERPL-MCNC: 28 MG/DL (ref 6–23)
CALCIUM SERPL-MCNC: 9.7 MG/DL (ref 8.6–10.2)
CEA SERPL-MCNC: 2.2 NG/ML (ref 0–5.2)
CHLORIDE SERPL-SCNC: 108 MMOL/L (ref 98–107)
CO2 SERPL-SCNC: 25 MMOL/L (ref 22–29)
CREAT SERPL-MCNC: 1.3 MG/DL (ref 0.7–1.2)
EOSINOPHIL # BLD: 0.1 E9/L (ref 0.05–0.5)
EOSINOPHIL NFR BLD: 1.9 % (ref 0–6)
ERYTHROCYTE [DISTWIDTH] IN BLOOD BY AUTOMATED COUNT: 13.2 FL (ref 11.5–15)
GLUCOSE SERPL-MCNC: 99 MG/DL (ref 74–99)
HCT VFR BLD AUTO: 38.8 % (ref 37–54)
HGB BLD-MCNC: 12.9 G/DL (ref 12.5–16.5)
IMM GRANULOCYTES # BLD: 0.02 E9/L
IMM GRANULOCYTES NFR BLD: 0.4 % (ref 0–5)
LYMPHOCYTES # BLD: 0.73 E9/L (ref 1.5–4)
LYMPHOCYTES NFR BLD: 13.7 % (ref 20–42)
MCH RBC QN AUTO: 30.4 PG (ref 26–35)
MCHC RBC AUTO-ENTMCNC: 33.2 % (ref 32–34.5)
MCV RBC AUTO: 91.5 FL (ref 80–99.9)
MONOCYTES # BLD: 0.34 E9/L (ref 0.1–0.95)
MONOCYTES NFR BLD: 6.4 % (ref 2–12)
NEUTROPHILS # BLD: 4.08 E9/L (ref 1.8–7.3)
NEUTS SEG NFR BLD: 76.8 % (ref 43–80)
PLATELET # BLD AUTO: 152 E9/L (ref 130–450)
PMV BLD AUTO: 10 FL (ref 7–12)
POTASSIUM SERPL-SCNC: 4.9 MMOL/L (ref 3.5–5)
PROT SERPL-MCNC: 7.9 G/DL (ref 6.4–8.3)
RBC # BLD AUTO: 4.24 E12/L (ref 3.8–5.8)
SODIUM SERPL-SCNC: 143 MMOL/L (ref 132–146)
WBC # BLD: 5.3 E9/L (ref 4.5–11.5)

## 2023-05-01 PROCEDURE — 85025 COMPLETE CBC W/AUTO DIFF WBC: CPT

## 2023-05-01 PROCEDURE — 36415 COLL VENOUS BLD VENIPUNCTURE: CPT

## 2023-05-01 PROCEDURE — 80053 COMPREHEN METABOLIC PANEL: CPT

## 2023-05-01 PROCEDURE — 82378 CARCINOEMBRYONIC ANTIGEN: CPT

## 2023-05-02 ENCOUNTER — OFFICE VISIT (OUTPATIENT)
Dept: ONCOLOGY | Age: 73
End: 2023-05-02
Payer: MEDICARE

## 2023-05-02 ENCOUNTER — HOSPITAL ENCOUNTER (OUTPATIENT)
Dept: INFUSION THERAPY | Age: 73
Discharge: HOME | End: 2023-05-02
Payer: MEDICARE

## 2023-05-02 VITALS
BODY MASS INDEX: 31.56 KG/M2 | DIASTOLIC BLOOD PRESSURE: 85 MMHG | SYSTOLIC BLOOD PRESSURE: 139 MMHG | HEIGHT: 73 IN | TEMPERATURE: 98.1 F | WEIGHT: 238.1 LBS | OXYGEN SATURATION: 98 % | HEART RATE: 54 BPM

## 2023-05-02 DIAGNOSIS — C78.7 RECTAL CANCER METASTASIZED TO LIVER (HCC): Primary | ICD-10-CM

## 2023-05-02 DIAGNOSIS — C20 RECTAL CANCER (HCC): Primary | ICD-10-CM

## 2023-05-02 DIAGNOSIS — C20 RECTAL CANCER METASTASIZED TO LIVER (HCC): Primary | ICD-10-CM

## 2023-05-02 PROCEDURE — 2580000003 HC RX 258: Performed by: INTERNAL MEDICINE

## 2023-05-02 PROCEDURE — 1123F ACP DISCUSS/DSCN MKR DOCD: CPT | Performed by: INTERNAL MEDICINE

## 2023-05-02 PROCEDURE — 96523 IRRIG DRUG DELIVERY DEVICE: CPT

## 2023-05-02 PROCEDURE — 99214 OFFICE O/P EST MOD 30 MIN: CPT

## 2023-05-02 PROCEDURE — 99214 OFFICE O/P EST MOD 30 MIN: CPT | Performed by: INTERNAL MEDICINE

## 2023-05-02 PROCEDURE — 6360000002 HC RX W HCPCS: Performed by: INTERNAL MEDICINE

## 2023-05-02 RX ORDER — SODIUM CHLORIDE 0.9 % (FLUSH) 0.9 %
10 SYRINGE (ML) INJECTION PRN
Status: DISCONTINUED | OUTPATIENT
Start: 2023-05-02 | End: 2023-05-03 | Stop reason: HOSPADM

## 2023-05-02 RX ORDER — SODIUM CHLORIDE 0.9 % (FLUSH) 0.9 %
10 SYRINGE (ML) INJECTION PRN
OUTPATIENT
Start: 2023-05-02

## 2023-05-02 RX ORDER — HEPARIN SODIUM (PORCINE) LOCK FLUSH IV SOLN 100 UNIT/ML 100 UNIT/ML
500 SOLUTION INTRAVENOUS PRN
Status: DISCONTINUED | OUTPATIENT
Start: 2023-05-02 | End: 2023-05-03 | Stop reason: HOSPADM

## 2023-05-02 RX ORDER — WATER 1000 ML/1000ML
2.2 INJECTION, SOLUTION INTRAVENOUS ONCE
OUTPATIENT
Start: 2023-05-02 | End: 2023-05-02

## 2023-05-02 RX ORDER — HEPARIN SODIUM (PORCINE) LOCK FLUSH IV SOLN 100 UNIT/ML 100 UNIT/ML
500 SOLUTION INTRAVENOUS PRN
OUTPATIENT
Start: 2023-05-02

## 2023-05-02 RX ADMIN — SODIUM CHLORIDE, PRESERVATIVE FREE 10 ML: 5 INJECTION INTRAVENOUS at 08:48

## 2023-05-02 RX ADMIN — Medication 500 UNITS: at 08:48

## 2023-05-02 NOTE — PROGRESS NOTES
due to significant toxicity) + Avastin was on 03/16/2021. Cycle # 105 FOLFIRI (same dose as cycle # 104) + Avastin was on 03/30/2021. CEA 6.5 on 03/29/2021. Cycle # 106 FOLFIRI (same dose as cycle # 104) + Avastin was on 04/13/2021. CEA 6.0 on 04/12/2021  CT chest 04/20/2021 no evidence of metastatic disease. CT abdomen/pelvis 04/20/2021 No evidence of progressive metastatic disease. Stable 2 cm lesion in the right lobe of the liver, likely representing treated metastatic disease. Imaging reviewed. Continue FOLFIRI + Avastin and repeat scans in 3 months  Cycle # 107 FOLFIRI + Avastin was on 04/27/2021  Cycle # 108 FOLFIRI + Avastin was on 05/11/2021. CEA 6.4 on 05/10/2021. Cycle # 109 FOLFIRI (held Avastin due to upcoming surgery) on 05/25/2021. CEA 6.3 on 5/24/21  Cycle # 110 FOLFIRI (held Avastin due to upcoming surgery) on 06/08/2021. CEA 6.3 on 6/07/21. CEA 5.8 on 06/28/2021. Right inguinal/scrotol hernia repair on 07/13/2021 with Dr Carla Demarco with folely removal on 07/23/2021. He developed fever on 07/24/2021 and was brought to [address] [place], [place] [address] ER via EMS; Sepsis secondary to urinary tract infection; Completed Abx  CT chest 08/13/2021 no sign of recurrent or metastatic disease. CT abdomen/pelvis 08/13/2021 unchanged hepatic lesions. Splenomegaly and secondary signs of portal venous hypertension  CEA 4.0 on 08/16/2021  CEA 4.1 on 08/30/2021  Cycle # 111 FOLFIRI was on 08/31/2021. HBP team for evaluation of liver lesions; His lesion does appear to be a hemangioma as his prior metastatic deposits in his liver were hypo attenuating compared to this CT which is hyper attenuating. compared to his prior CT scans he has disappearing liver metastasis. MRI liver on 09/17/2021 No suspicious liver lesion identified. Colonoscopy GI Dr. Lori Reilly on 09/20/2021 unremarkable  CEA 3.3 on 09/27/2021. CEA 3.2 on 10/25/2021. CT chest 11/08/2021: Similar chronic appearing findings.   No acute process or evidence of

## 2023-05-02 NOTE — PROGRESS NOTES
PORT FLUSH    Patient presents to clinic for [facility] today. Left chest  SQ port accessed per policy using 35Q .45QB  Campos needle for good blood return. Aspirate for waste and specimen sent to lab. Site flushed easily with 10 mL NSS followed by 5 mL Heparin solution 100 units/ml rinse prior to de-access. Dry sterile dressing to area. Tolerated procedure well. Encouraged to schedule port flush every 4 weeks.

## 2023-05-26 ENCOUNTER — HOSPITAL ENCOUNTER (OUTPATIENT)
Age: 73
Discharge: HOME | End: 2023-05-26
Payer: MEDICARE

## 2023-05-26 DIAGNOSIS — C20 RECTAL CANCER (HCC): ICD-10-CM

## 2023-05-26 LAB
ALBUMIN SERPL-MCNC: 4.4 G/DL (ref 3.5–5.2)
ALP SERPL-CCNC: 147 U/L (ref 40–129)
ALT SERPL-CCNC: 18 U/L (ref 0–40)
ANION GAP SERPL CALCULATED.3IONS-SCNC: 10 MMOL/L (ref 7–16)
AST SERPL-CCNC: 21 U/L (ref 0–39)
BASOPHILS # BLD: 0.02 E9/L (ref 0–0.2)
BASOPHILS NFR BLD: 0.4 % (ref 0–2)
BILIRUB SERPL-MCNC: 0.5 MG/DL (ref 0–1.2)
BUN SERPL-MCNC: 22 MG/DL (ref 6–23)
CALCIUM SERPL-MCNC: 9.6 MG/DL (ref 8.6–10.2)
CEA SERPL-MCNC: 2.1 NG/ML (ref 0–5.2)
CHLORIDE SERPL-SCNC: 106 MMOL/L (ref 98–107)
CO2 SERPL-SCNC: 24 MMOL/L (ref 22–29)
CREAT SERPL-MCNC: 1.3 MG/DL (ref 0.7–1.2)
EOSINOPHIL # BLD: 0.08 E9/L (ref 0.05–0.5)
EOSINOPHIL NFR BLD: 1.8 % (ref 0–6)
ERYTHROCYTE [DISTWIDTH] IN BLOOD BY AUTOMATED COUNT: 13 FL (ref 11.5–15)
GLUCOSE SERPL-MCNC: 100 MG/DL (ref 74–99)
HCT VFR BLD AUTO: 37.6 % (ref 37–54)
HGB BLD-MCNC: 12.3 G/DL (ref 12.5–16.5)
IMM GRANULOCYTES # BLD: 0.01 E9/L
IMM GRANULOCYTES NFR BLD: 0.2 % (ref 0–5)
LYMPHOCYTES # BLD: 0.61 E9/L (ref 1.5–4)
LYMPHOCYTES NFR BLD: 13.5 % (ref 20–42)
MCH RBC QN AUTO: 30.2 PG (ref 26–35)
MCHC RBC AUTO-ENTMCNC: 32.7 % (ref 32–34.5)
MCV RBC AUTO: 92.4 FL (ref 80–99.9)
MONOCYTES # BLD: 0.3 E9/L (ref 0.1–0.95)
MONOCYTES NFR BLD: 6.6 % (ref 2–12)
NEUTROPHILS # BLD: 3.51 E9/L (ref 1.8–7.3)
NEUTS SEG NFR BLD: 77.5 % (ref 43–80)
PLATELET # BLD AUTO: 128 E9/L (ref 130–450)
PMV BLD AUTO: 10.5 FL (ref 7–12)
POTASSIUM SERPL-SCNC: 4.3 MMOL/L (ref 3.5–5)
PROT SERPL-MCNC: 7.4 G/DL (ref 6.4–8.3)
RBC # BLD AUTO: 4.07 E12/L (ref 3.8–5.8)
SODIUM SERPL-SCNC: 140 MMOL/L (ref 132–146)
WBC # BLD: 4.5 E9/L (ref 4.5–11.5)

## 2023-05-26 PROCEDURE — 80053 COMPREHEN METABOLIC PANEL: CPT

## 2023-05-26 PROCEDURE — 82378 CARCINOEMBRYONIC ANTIGEN: CPT

## 2023-05-26 PROCEDURE — 36415 COLL VENOUS BLD VENIPUNCTURE: CPT

## 2023-05-26 PROCEDURE — 85025 COMPLETE CBC W/AUTO DIFF WBC: CPT

## 2023-05-30 ENCOUNTER — HOSPITAL ENCOUNTER (OUTPATIENT)
Dept: INFUSION THERAPY | Age: 73
Discharge: HOME | End: 2023-05-30
Payer: MEDICARE

## 2023-05-30 ENCOUNTER — OFFICE VISIT (OUTPATIENT)
Dept: ONCOLOGY | Age: 73
End: 2023-05-30
Payer: MEDICARE

## 2023-05-30 VITALS
DIASTOLIC BLOOD PRESSURE: 78 MMHG | WEIGHT: 233.5 LBS | HEIGHT: 73 IN | OXYGEN SATURATION: 99 % | TEMPERATURE: 97.9 F | BODY MASS INDEX: 30.95 KG/M2 | SYSTOLIC BLOOD PRESSURE: 137 MMHG | HEART RATE: 61 BPM

## 2023-05-30 DIAGNOSIS — C20 RECTAL CANCER METASTASIZED TO LIVER (HCC): Primary | ICD-10-CM

## 2023-05-30 DIAGNOSIS — H93.19: Primary | ICD-10-CM

## 2023-05-30 DIAGNOSIS — C20 RECTAL CANCER METASTASIZED TO LIVER (HCC): ICD-10-CM

## 2023-05-30 DIAGNOSIS — C78.7 RECTAL CANCER METASTASIZED TO LIVER (HCC): ICD-10-CM

## 2023-05-30 DIAGNOSIS — C20 RECTAL CANCER (HCC): Primary | ICD-10-CM

## 2023-05-30 DIAGNOSIS — C78.7 RECTAL CANCER METASTASIZED TO LIVER (HCC): Primary | ICD-10-CM

## 2023-05-30 PROCEDURE — 99214 OFFICE O/P EST MOD 30 MIN: CPT

## 2023-05-30 PROCEDURE — 99213 OFFICE O/P EST LOW 20 MIN: CPT | Performed by: INTERNAL MEDICINE

## 2023-05-30 PROCEDURE — 6360000002 HC RX W HCPCS: Performed by: INTERNAL MEDICINE

## 2023-05-30 PROCEDURE — 96523 IRRIG DRUG DELIVERY DEVICE: CPT

## 2023-05-30 PROCEDURE — 1123F ACP DISCUSS/DSCN MKR DOCD: CPT | Performed by: INTERNAL MEDICINE

## 2023-05-30 PROCEDURE — 2580000003 HC RX 258: Performed by: INTERNAL MEDICINE

## 2023-05-30 RX ORDER — HEPARIN SODIUM (PORCINE) LOCK FLUSH IV SOLN 100 UNIT/ML 100 UNIT/ML
500 SOLUTION INTRAVENOUS PRN
Status: DISCONTINUED | OUTPATIENT
Start: 2023-05-30 | End: 2023-05-31 | Stop reason: HOSPADM

## 2023-05-30 RX ORDER — HEPARIN SODIUM (PORCINE) LOCK FLUSH IV SOLN 100 UNIT/ML 100 UNIT/ML
500 SOLUTION INTRAVENOUS PRN
Status: CANCELLED | OUTPATIENT
Start: 2023-05-30

## 2023-05-30 RX ORDER — SODIUM CHLORIDE 0.9 % (FLUSH) 0.9 %
10 SYRINGE (ML) INJECTION PRN
OUTPATIENT
Start: 2023-05-30

## 2023-05-30 RX ORDER — WATER 1000 ML/1000ML
2.2 INJECTION, SOLUTION INTRAVENOUS ONCE
OUTPATIENT
Start: 2023-05-30 | End: 2023-05-30

## 2023-05-30 RX ORDER — SODIUM CHLORIDE 0.9 % (FLUSH) 0.9 %
10 SYRINGE (ML) INJECTION PRN
Status: DISCONTINUED | OUTPATIENT
Start: 2023-05-30 | End: 2023-05-31 | Stop reason: HOSPADM

## 2023-05-30 RX ADMIN — SODIUM CHLORIDE, PRESERVATIVE FREE 10 ML: 5 INJECTION INTRAVENOUS at 08:39

## 2023-05-30 RX ADMIN — Medication 500 UNITS: at 08:39

## 2023-05-30 NOTE — PROGRESS NOTES
reduced due to significant toxicity) + Avastin was on 03/16/2021. Cycle # 105 FOLFIRI (same dose as cycle # 104) + Avastin was on 03/30/2021. CEA 6.5 on 03/29/2021. Cycle # 106 FOLFIRI (same dose as cycle # 104) + Avastin was on 04/13/2021. CEA 6.0 on 04/12/2021  CT chest 04/20/2021 no evidence of metastatic disease. CT abdomen/pelvis 04/20/2021 No evidence of progressive metastatic disease. Stable 2 cm lesion in the right lobe of the liver, likely representing treated metastatic disease. Imaging reviewed. Continue FOLFIRI + Avastin and repeat scans in 3 months  Cycle # 107 FOLFIRI + Avastin was on 04/27/2021  Cycle # 108 FOLFIRI + Avastin was on 05/11/2021. CEA 6.4 on 05/10/2021. Cycle # 109 FOLFIRI (held Avastin due to upcoming surgery) on 05/25/2021. CEA 6.3 on 5/24/21  Cycle # 110 FOLFIRI (held Avastin due to upcoming surgery) on 06/08/2021. CEA 6.3 on 6/07/21. CEA 5.8 on 06/28/2021. Right inguinal/scrotol hernia repair on 07/13/2021 with Dr Sara Pineda with folely removal on 07/23/2021. He developed fever on 07/24/2021 and was brought to [address] [place], [place] [address] ER via EMS; Sepsis secondary to urinary tract infection; Completed Abx  CT chest 08/13/2021 no sign of recurrent or metastatic disease. CT abdomen/pelvis 08/13/2021 unchanged hepatic lesions. Splenomegaly and secondary signs of portal venous hypertension  CEA 4.0 on 08/16/2021  CEA 4.1 on 08/30/2021  Cycle # 111 FOLFIRI was on 08/31/2021. HBP team for evaluation of liver lesions; His lesion does appear to be a hemangioma as his prior metastatic deposits in his liver were hypo attenuating compared to this CT which is hyper attenuating. compared to his prior CT scans he has disappearing liver metastasis. MRI liver on 09/17/2021 No suspicious liver lesion identified. Colonoscopy GI Dr. Regan Genao on 09/20/2021 unremarkable  CEA 3.3 on 09/27/2021. CEA 3.2 on 10/25/2021. CT chest 11/08/2021: Similar chronic appearing findings.   No acute process or evidence

## 2023-05-30 NOTE — PROGRESS NOTES
PORT FLUSH    Patient presents to clinic for [facility] today. L chest  SQ port accessed per policy using 20 G, 7.51 inches Campos needle for good blood return. Site flushed easily with 10 mL NSS followed by 5 mL Heparin solution 100 units/ml rinse prior to de-access. Dry sterile dressing to area. Tolerated procedure well. Encouraged to schedule port flush every 4 weeks.

## 2023-06-26 ENCOUNTER — HOSPITAL ENCOUNTER (OUTPATIENT)
Age: 73
Discharge: HOME | End: 2023-06-26
Payer: MEDICARE

## 2023-06-26 DIAGNOSIS — C78.7 RECTAL CANCER METASTASIZED TO LIVER (HCC): ICD-10-CM

## 2023-06-26 DIAGNOSIS — C20 RECTAL CANCER METASTASIZED TO LIVER (HCC): ICD-10-CM

## 2023-06-26 DIAGNOSIS — C20 RECTAL CANCER (HCC): ICD-10-CM

## 2023-06-26 LAB
ALBUMIN SERPL-MCNC: 4.4 G/DL (ref 3.5–5.2)
ALP SERPL-CCNC: 137 U/L (ref 40–129)
ALT SERPL-CCNC: 19 U/L (ref 0–40)
ANION GAP SERPL CALCULATED.3IONS-SCNC: 11 MMOL/L (ref 7–16)
AST SERPL-CCNC: 24 U/L (ref 0–39)
BASOPHILS # BLD: 0.02 E9/L (ref 0–0.2)
BASOPHILS NFR BLD: 0.5 % (ref 0–2)
BILIRUB SERPL-MCNC: 0.5 MG/DL (ref 0–1.2)
BUN SERPL-MCNC: 19 MG/DL (ref 6–23)
CALCIUM SERPL-MCNC: 9.4 MG/DL (ref 8.6–10.2)
CEA SERPL-MCNC: 2.3 NG/ML (ref 0–5.2)
CHLORIDE SERPL-SCNC: 105 MMOL/L (ref 98–107)
CO2 SERPL-SCNC: 24 MMOL/L (ref 22–29)
CREAT SERPL-MCNC: 1.3 MG/DL (ref 0.7–1.2)
EOSINOPHIL # BLD: 0.1 E9/L (ref 0.05–0.5)
EOSINOPHIL NFR BLD: 2.4 % (ref 0–6)
ERYTHROCYTE [DISTWIDTH] IN BLOOD BY AUTOMATED COUNT: 13.1 FL (ref 11.5–15)
GLUCOSE SERPL-MCNC: 117 MG/DL (ref 74–99)
HCT VFR BLD AUTO: 37.1 % (ref 37–54)
HGB BLD-MCNC: 12.2 G/DL (ref 12.5–16.5)
IMM GRANULOCYTES # BLD: 0.02 E9/L
IMM GRANULOCYTES NFR BLD: 0.5 % (ref 0–5)
LYMPHOCYTES # BLD: 0.71 E9/L (ref 1.5–4)
LYMPHOCYTES NFR BLD: 17.1 % (ref 20–42)
MCH RBC QN AUTO: 30.3 PG (ref 26–35)
MCHC RBC AUTO-ENTMCNC: 32.9 % (ref 32–34.5)
MCV RBC AUTO: 92.1 FL (ref 80–99.9)
MONOCYTES # BLD: 0.28 E9/L (ref 0.1–0.95)
MONOCYTES NFR BLD: 6.8 % (ref 2–12)
NEUTROPHILS # BLD: 3.01 E9/L (ref 1.8–7.3)
NEUTS SEG NFR BLD: 72.7 % (ref 43–80)
PLATELET # BLD AUTO: 132 E9/L (ref 130–450)
PMV BLD AUTO: 10.2 FL (ref 7–12)
POTASSIUM SERPL-SCNC: 4.3 MMOL/L (ref 3.5–5)
PROT SERPL-MCNC: 7.3 G/DL (ref 6.4–8.3)
RBC # BLD AUTO: 4.03 E12/L (ref 3.8–5.8)
SODIUM SERPL-SCNC: 140 MMOL/L (ref 132–146)
WBC # BLD: 4.1 E9/L (ref 4.5–11.5)

## 2023-06-26 PROCEDURE — 36415 COLL VENOUS BLD VENIPUNCTURE: CPT

## 2023-06-26 PROCEDURE — 82378 CARCINOEMBRYONIC ANTIGEN: CPT

## 2023-06-26 PROCEDURE — 85025 COMPLETE CBC W/AUTO DIFF WBC: CPT

## 2023-06-26 PROCEDURE — 80053 COMPREHEN METABOLIC PANEL: CPT

## 2023-06-27 ENCOUNTER — HOSPITAL ENCOUNTER (OUTPATIENT)
Dept: INFUSION THERAPY | Age: 73
Discharge: HOME | End: 2023-06-27
Payer: MEDICARE

## 2023-06-27 ENCOUNTER — OFFICE VISIT (OUTPATIENT)
Dept: ONCOLOGY | Age: 73
End: 2023-06-27
Payer: MEDICARE

## 2023-06-27 VITALS
TEMPERATURE: 97.8 F | HEART RATE: 61 BPM | BODY MASS INDEX: 31.01 KG/M2 | SYSTOLIC BLOOD PRESSURE: 139 MMHG | HEIGHT: 73 IN | OXYGEN SATURATION: 98 % | DIASTOLIC BLOOD PRESSURE: 90 MMHG | WEIGHT: 233.98 LBS

## 2023-06-27 DIAGNOSIS — C20 RECTAL CANCER (HCC): Primary | ICD-10-CM

## 2023-06-27 DIAGNOSIS — C78.7 RECTAL CANCER METASTASIZED TO LIVER (HCC): Primary | ICD-10-CM

## 2023-06-27 DIAGNOSIS — C20 RECTAL CANCER METASTASIZED TO LIVER (HCC): Primary | ICD-10-CM

## 2023-06-27 PROCEDURE — 1123F ACP DISCUSS/DSCN MKR DOCD: CPT | Performed by: INTERNAL MEDICINE

## 2023-06-27 PROCEDURE — 99213 OFFICE O/P EST LOW 20 MIN: CPT | Performed by: INTERNAL MEDICINE

## 2023-06-27 PROCEDURE — 2580000003 HC RX 258: Performed by: INTERNAL MEDICINE

## 2023-06-27 PROCEDURE — 99214 OFFICE O/P EST MOD 30 MIN: CPT

## 2023-06-27 PROCEDURE — 96523 IRRIG DRUG DELIVERY DEVICE: CPT

## 2023-06-27 PROCEDURE — 6360000002 HC RX W HCPCS: Performed by: INTERNAL MEDICINE

## 2023-06-27 RX ORDER — WATER 1000 ML/1000ML
2.2 INJECTION, SOLUTION INTRAVENOUS ONCE
OUTPATIENT
Start: 2023-06-27 | End: 2023-06-27

## 2023-06-27 RX ORDER — SODIUM CHLORIDE 0.9 % (FLUSH) 0.9 %
10 SYRINGE (ML) INJECTION PRN
Status: DISCONTINUED | OUTPATIENT
Start: 2023-06-27 | End: 2023-06-28 | Stop reason: HOSPADM

## 2023-06-27 RX ORDER — HEPARIN SODIUM 100 [USP'U]/ML
500 INJECTION, SOLUTION INTRAVENOUS PRN
OUTPATIENT
Start: 2023-06-27

## 2023-06-27 RX ORDER — HEPARIN SODIUM 100 [USP'U]/ML
500 INJECTION, SOLUTION INTRAVENOUS PRN
Status: DISCONTINUED | OUTPATIENT
Start: 2023-06-27 | End: 2023-06-28 | Stop reason: HOSPADM

## 2023-06-27 RX ORDER — SODIUM CHLORIDE 0.9 % (FLUSH) 0.9 %
10 SYRINGE (ML) INJECTION PRN
OUTPATIENT
Start: 2023-06-27

## 2023-06-27 RX ADMIN — Medication 500 UNITS: at 08:32

## 2023-06-27 RX ADMIN — SODIUM CHLORIDE, PRESERVATIVE FREE 10 ML: 5 INJECTION INTRAVENOUS at 08:32

## 2023-07-13 ENCOUNTER — TELEPHONE (OUTPATIENT)
Dept: INFUSION THERAPY | Age: 73
End: 2023-07-13

## 2023-07-13 NOTE — TELEPHONE ENCOUNTER
Spoke with patient , he wanted to let Dr. Eva Henderson know he will be having his scan July 20th and will see him on the 25th. This nurse will send a note.

## 2023-07-24 ENCOUNTER — HOSPITAL ENCOUNTER (OUTPATIENT)
Age: 73
Discharge: HOME | End: 2023-07-24
Payer: MEDICARE

## 2023-07-24 DIAGNOSIS — C20 RECTAL CANCER (HCC): ICD-10-CM

## 2023-07-24 LAB
ALBUMIN SERPL-MCNC: 4.4 G/DL (ref 3.5–5.2)
ALP SERPL-CCNC: 140 U/L (ref 40–129)
ALT SERPL-CCNC: 19 U/L (ref 0–40)
ANION GAP SERPL CALCULATED.3IONS-SCNC: 8 MMOL/L (ref 7–16)
AST SERPL-CCNC: 22 U/L (ref 0–39)
BASOPHILS # BLD: 0.04 K/UL (ref 0–0.2)
BASOPHILS NFR BLD: 1 % (ref 0–2)
BILIRUB SERPL-MCNC: 0.6 MG/DL (ref 0–1.2)
BUN SERPL-MCNC: 18 MG/DL (ref 6–23)
CALCIUM SERPL-MCNC: 10.1 MG/DL (ref 8.6–10.2)
CEA SERPL-MCNC: 2 NG/ML (ref 0–5.2)
CHLORIDE SERPL-SCNC: 104 MMOL/L (ref 98–107)
CO2 SERPL-SCNC: 27 MMOL/L (ref 22–29)
CREAT SERPL-MCNC: 1.3 MG/DL (ref 0.7–1.2)
EOSINOPHIL # BLD: 0.12 K/UL (ref 0.05–0.5)
EOSINOPHILS RELATIVE PERCENT: 2 % (ref 0–6)
ERYTHROCYTE [DISTWIDTH] IN BLOOD BY AUTOMATED COUNT: 13.1 % (ref 11.5–15)
GFR SERPL CREATININE-BSD FRML MDRD: 57 ML/MIN/1.73M2
GLUCOSE SERPL-MCNC: 121 MG/DL (ref 74–99)
HCT VFR BLD AUTO: 38.2 % (ref 37–54)
HGB BLD-MCNC: 12.7 G/DL (ref 12.5–16.5)
IMM GRANULOCYTES # BLD AUTO: 0.04 K/UL (ref 0–0.58)
IMM GRANULOCYTES NFR BLD: 1 % (ref 0–5)
LYMPHOCYTES NFR BLD: 0.77 K/UL (ref 1.5–4)
LYMPHOCYTES RELATIVE PERCENT: 15 % (ref 20–42)
MCH RBC QN AUTO: 30.6 PG (ref 26–35)
MCHC RBC AUTO-ENTMCNC: 33.2 G/DL (ref 32–34.5)
MCV RBC AUTO: 92 FL (ref 80–99.9)
MONOCYTES NFR BLD: 0.28 K/UL (ref 0.1–0.95)
MONOCYTES NFR BLD: 5 % (ref 2–12)
NEUTROPHILS NFR BLD: 76 % (ref 43–80)
NEUTS SEG NFR BLD: 4.03 K/UL (ref 1.8–7.3)
PLATELET # BLD AUTO: 145 K/UL (ref 130–450)
PMV BLD AUTO: 9.2 FL (ref 7–12)
POTASSIUM SERPL-SCNC: 4.6 MMOL/L (ref 3.5–5)
PROT SERPL-MCNC: 7.8 G/DL (ref 6.4–8.3)
RBC # BLD AUTO: 4.15 M/UL (ref 3.8–5.8)
SODIUM SERPL-SCNC: 139 MMOL/L (ref 132–146)
WBC OTHER # BLD: 5.3 K/UL (ref 4.5–11.5)

## 2023-07-24 PROCEDURE — 85027 COMPLETE CBC AUTOMATED: CPT

## 2023-07-24 PROCEDURE — 36415 COLL VENOUS BLD VENIPUNCTURE: CPT

## 2023-07-24 PROCEDURE — 80053 COMPREHEN METABOLIC PANEL: CPT

## 2023-07-24 PROCEDURE — 82378 CARCINOEMBRYONIC ANTIGEN: CPT

## 2023-07-25 ENCOUNTER — HOSPITAL ENCOUNTER (OUTPATIENT)
Dept: INFUSION THERAPY | Age: 73
Discharge: HOME | End: 2023-07-25
Payer: MEDICARE

## 2023-07-25 ENCOUNTER — OFFICE VISIT (OUTPATIENT)
Dept: ONCOLOGY | Age: 73
End: 2023-07-25
Payer: MEDICARE

## 2023-07-25 VITALS
SYSTOLIC BLOOD PRESSURE: 142 MMHG | BODY MASS INDEX: 30.93 KG/M2 | DIASTOLIC BLOOD PRESSURE: 82 MMHG | HEART RATE: 55 BPM | OXYGEN SATURATION: 99 % | HEIGHT: 73 IN | TEMPERATURE: 98.1 F | WEIGHT: 233.4 LBS

## 2023-07-25 DIAGNOSIS — C20 RECTAL CANCER METASTASIZED TO LIVER (HCC): Primary | ICD-10-CM

## 2023-07-25 DIAGNOSIS — C20 RECTAL CANCER (HCC): Primary | ICD-10-CM

## 2023-07-25 DIAGNOSIS — C78.7 RECTAL CANCER METASTASIZED TO LIVER (HCC): Primary | ICD-10-CM

## 2023-07-25 PROCEDURE — 2580000003 HC RX 258: Performed by: INTERNAL MEDICINE

## 2023-07-25 PROCEDURE — 99214 OFFICE O/P EST MOD 30 MIN: CPT

## 2023-07-25 PROCEDURE — 96523 IRRIG DRUG DELIVERY DEVICE: CPT

## 2023-07-25 PROCEDURE — 1123F ACP DISCUSS/DSCN MKR DOCD: CPT | Performed by: INTERNAL MEDICINE

## 2023-07-25 PROCEDURE — 99214 OFFICE O/P EST MOD 30 MIN: CPT | Performed by: INTERNAL MEDICINE

## 2023-07-25 PROCEDURE — 6360000002 HC RX W HCPCS: Performed by: INTERNAL MEDICINE

## 2023-07-25 RX ORDER — WATER 1000 ML/1000ML
2.2 INJECTION, SOLUTION INTRAVENOUS ONCE
OUTPATIENT
Start: 2023-07-25 | End: 2023-07-25

## 2023-07-25 RX ORDER — HEPARIN 100 UNIT/ML
500 SYRINGE INTRAVENOUS PRN
OUTPATIENT
Start: 2023-07-25

## 2023-07-25 RX ORDER — SODIUM CHLORIDE 0.9 % (FLUSH) 0.9 %
10 SYRINGE (ML) INJECTION PRN
OUTPATIENT
Start: 2023-07-25

## 2023-07-25 RX ORDER — SODIUM CHLORIDE 0.9 % (FLUSH) 0.9 %
10 SYRINGE (ML) INJECTION PRN
Status: DISCONTINUED | OUTPATIENT
Start: 2023-07-25 | End: 2023-07-26 | Stop reason: HOSPADM

## 2023-07-25 RX ORDER — HEPARIN 100 UNIT/ML
500 SYRINGE INTRAVENOUS PRN
Status: DISCONTINUED | OUTPATIENT
Start: 2023-07-25 | End: 2023-07-26 | Stop reason: HOSPADM

## 2023-07-25 RX ADMIN — Medication 500 UNITS: at 08:39

## 2023-07-25 RX ADMIN — SODIUM CHLORIDE, PRESERVATIVE FREE 10 ML: 5 INJECTION INTRAVENOUS at 08:39

## 2023-07-25 NOTE — PROGRESS NOTES
Department of [address]            Attending Clinic Note     Reason for Visit: Follow-up on a patient with Metastatic Rectal Cancer. PCP: Roya Mittal MD     History of Present Illness:  76 y/o  male who was referred to see Dr. Ej Mccord (GI team) for evaluation of bright red blood per rectum, mild anemia and change in bowel habits with diarrhea. CEA 2640 on 10/19/2015. AlcP 299 AST 57 ALT 75 on 10/19/2015. Colonoscopy in 2013 noted no significant polyps, colitis or lesions at that time. Denies any Family History of colorectal cancer or polyps. Colonoscopy on 10/19/2015 revealed:  1. Ascending polyp, 8 mm, hot snare: Tubulovillous adenoma. 2. Transverse polyp, 1 cm, hot snare: Serrated polyp most consistent with sessile serrated adenoma. 3. Five splenic flexure polyps, three - 5 mm, 7 mm, 8 mm, hot snare and biopsy: Four segments of Tubular Adenoma  4. Descending polyp, 5 mm, biopsy: Tubular adenoma. 5. Sigmoid polyp, 4 mm biopsy, Serrated polyp most consistent with sessile serrated adenoma. 6. Two rectal polyps, 4 mm biopsy: Serrated polyp most consistent with hyperplastic polyp. 7. Rectosigmoid colon mass (large mass approximately 65% circumference of the lumen; Very friable, firm and hard): Tubulovillous adenoma with associated focal erosion and fibroplasia. CT scan abdomen/pelvis on 10/26/2015:  1. Small nodules at lung bases likely represent metastatic colon   cancer. 2. Extensive likely metastatic colon cancer throughout the liver. 3. Mild mural thickening and luminal narrowing in the   terminal ileum, otherwise nonspecific. Colonoscopy with snare removal rectal mass was performed by Dr. Gunjan Veronica. Pathology proved:  Rectal polyp: Invasive adenocarcinoma involving villous adenoma and extending to the cauterized edge of excision. KRAS Mutation: Mutation detected.   BRAF Mutation: Mutation not

## 2023-07-25 NOTE — PROGRESS NOTES
PORT FLUSH    Patient presents to clinic for [facility] today. L chest  SQ port accessed per policy using 64A 2.00 inches Campos needle for good blood return. Site flushed easily with 10 mL NSS followed by 5 mL Heparin solution 100 units/ml rinse prior to de-access. Dry sterile dressing to area. Tolerated procedure well. Encouraged to schedule port flush every 4 weeks.

## 2023-07-26 ENCOUNTER — PROCEDURE VISIT (OUTPATIENT)
Dept: AUDIOLOGY | Age: 73
End: 2023-07-26
Payer: MEDICARE

## 2023-07-26 ENCOUNTER — OFFICE VISIT (OUTPATIENT)
Dept: ENT CLINIC | Age: 73
End: 2023-07-26
Payer: MEDICARE

## 2023-07-26 VITALS
SYSTOLIC BLOOD PRESSURE: 129 MMHG | DIASTOLIC BLOOD PRESSURE: 82 MMHG | WEIGHT: 231.8 LBS | BODY MASS INDEX: 30.72 KG/M2 | HEIGHT: 73 IN | HEART RATE: 56 BPM

## 2023-07-26 DIAGNOSIS — H93.13 TINNITUS OF BOTH EARS: Primary | ICD-10-CM

## 2023-07-26 DIAGNOSIS — H93.13 TINNITUS OF BOTH EARS: ICD-10-CM

## 2023-07-26 DIAGNOSIS — H90.3 SENSORINEURAL HEARING LOSS, BILATERAL: ICD-10-CM

## 2023-07-26 DIAGNOSIS — H90.3 SENSORINEURAL HEARING LOSS (SNHL) OF BOTH EARS: Primary | ICD-10-CM

## 2023-07-26 PROCEDURE — 99203 OFFICE O/P NEW LOW 30 MIN: CPT | Performed by: NURSE PRACTITIONER

## 2023-07-26 PROCEDURE — 92557 COMPREHENSIVE HEARING TEST: CPT | Performed by: AUDIOLOGIST

## 2023-07-26 PROCEDURE — 1123F ACP DISCUSS/DSCN MKR DOCD: CPT | Performed by: NURSE PRACTITIONER

## 2023-07-26 PROCEDURE — 92567 TYMPANOMETRY: CPT | Performed by: AUDIOLOGIST

## 2023-07-26 ASSESSMENT — ENCOUNTER SYMPTOMS
SINUS PRESSURE: 0
STRIDOR: 0
RHINORRHEA: 0
SHORTNESS OF BREATH: 0
RESPIRATORY NEGATIVE: 1
EYES NEGATIVE: 1
SINUS PAIN: 0

## 2023-07-26 NOTE — PROGRESS NOTES
This patient was referred for audiometric and tympanometric testing by MANOJ Nath due to bilateral tinnitus and a significant decrease in hearing sensitivity, bilaterally. Audiometry using pure tone air and bone conduction testing revealed a moderate-to-severe  sensorineural hearing loss, bilaterally. Reliability was good. Speech reception thresholds were in good agreement with the pure tone averages, bilaterally. Speech discrimination scores were 84%, right ear and 64%, left ear at 70-75dBHL. Tympanometry revealed normal middle ear peak pressure and compliance, bilaterally. Ipsilateral acoustic reflexes were absent, bilaterally at 1000Hz. The results were reviewed with the patient. Recommendations for follow up will be made pending physician consult.     Alba Soto CCC/JUAN  Audiologist  [id number]  NPI#:  [id number]      Electronically signed by Terri Kelsey on 7/26/2023 at 3:05 PM

## 2023-07-26 NOTE — PROGRESS NOTES
[facility] Otolaryngology  Dr. Harriet Taylor. Ms.Ed        Patient Name:  Campos Haro  :  1950     CHIEF C/O:    Chief Complaint   Patient presents with    Ear Problem     Pt states his ears ring all the time, states he has trouble hearing. Been going on for many years       HISTORY OBTAINED FROM:  patient    HISTORY OF PRESENT ILLNESS:       Marianela Fabian is a 75y.o. year old male, here today for follow up of:       Tinnitus and hearing loss. Patient states he has suffered from tinnitus for roughly 50 years since exiting the [employer]. He does have a history of noise exposure both in the [employer] and working after his discharge. He does note bilateral hearing loss with difficulty hearing especially in crowded situations with significant background noise. He denies any other family history of hearing loss. He denies history of recurrent ear infections or previous ear surgeries. He does have constant bilateral tinnitus that he states is a high-frequency ringing with intermittent buzzing sound in the left ear. Denies any dizziness with his symptoms. He denies any sinus congestion, rhinorrhea, postnasal drainage. Patient states he is interested in hearing aids if he has coverage. Ear Problem  Pertinent negatives include no chest pain or congestion.         Past Medical History:   Diagnosis Date    Arthritis     Cancer ([address])     colon    Depression     History of blood transfusion     [facility]    Hyperlipidemia     Hypertension     Prolonged emergence from general anesthesia     Retention of urine      Past Surgical History:   Procedure Laterality Date    CHOLECYSTECTOMY, LAPAROSCOPIC N/A 10/27/2020    CHOLECYSTECTOMY LAPAROSCOPIC WITH IOC, ROBOT XI ASSISTED, POSS OPEN performed by Lucina Zavala MD at [address]  11/2/15    bx, rectal mass    FRACTURE SURGERY      HERNIA REPAIR Right 2021    OPEN RIGHT INGUINAL HERNIA REPAIR WITH MESH (CPT 34759) performed by Noris Wright

## 2023-08-18 ENCOUNTER — TELEPHONE (OUTPATIENT)
Dept: ENT CLINIC | Age: 73
End: 2023-08-18

## 2023-08-18 NOTE — TELEPHONE ENCOUNTER
Pt called in saying he does not have any coverage with his insurance for hearing aids. Said since he has to pay out of pocket for them he would like to continue to set up the process and get hearing aids here.

## 2023-08-22 ENCOUNTER — HOSPITAL ENCOUNTER (OUTPATIENT)
Age: 73
Discharge: HOME | End: 2023-08-22
Payer: MEDICARE

## 2023-08-22 DIAGNOSIS — C20 RECTAL CANCER (HCC): ICD-10-CM

## 2023-08-22 LAB
ALBUMIN SERPL-MCNC: 4.7 G/DL (ref 3.5–5.2)
ALP SERPL-CCNC: 149 U/L (ref 40–129)
ALT SERPL-CCNC: 20 U/L (ref 0–40)
ANION GAP SERPL CALCULATED.3IONS-SCNC: 8 MMOL/L (ref 7–16)
AST SERPL-CCNC: 25 U/L (ref 0–39)
BASOPHILS # BLD: 0.04 K/UL (ref 0–0.2)
BASOPHILS NFR BLD: 1 % (ref 0–2)
BILIRUB SERPL-MCNC: 0.5 MG/DL (ref 0–1.2)
BUN SERPL-MCNC: 21 MG/DL (ref 6–23)
CALCIUM SERPL-MCNC: 9.9 MG/DL (ref 8.6–10.2)
CEA SERPL-MCNC: 1.6 NG/ML (ref 0–5.2)
CHLORIDE SERPL-SCNC: 105 MMOL/L (ref 98–107)
CO2 SERPL-SCNC: 26 MMOL/L (ref 22–29)
CREAT SERPL-MCNC: 1.3 MG/DL (ref 0.7–1.2)
EOSINOPHIL # BLD: 0.14 K/UL (ref 0.05–0.5)
EOSINOPHILS RELATIVE PERCENT: 3 % (ref 0–6)
ERYTHROCYTE [DISTWIDTH] IN BLOOD BY AUTOMATED COUNT: 13 % (ref 11.5–15)
GFR SERPL CREATININE-BSD FRML MDRD: 59 ML/MIN/1.73M2
GLUCOSE SERPL-MCNC: 98 MG/DL (ref 74–99)
HCT VFR BLD AUTO: 37.4 % (ref 37–54)
HGB BLD-MCNC: 12.6 G/DL (ref 12.5–16.5)
IMM GRANULOCYTES # BLD AUTO: <0.03 K/UL (ref 0–0.58)
IMM GRANULOCYTES NFR BLD: 0 % (ref 0–5)
LYMPHOCYTES NFR BLD: 0.73 K/UL (ref 1.5–4)
LYMPHOCYTES RELATIVE PERCENT: 15 % (ref 20–42)
MCH RBC QN AUTO: 31 PG (ref 26–35)
MCHC RBC AUTO-ENTMCNC: 33.7 G/DL (ref 32–34.5)
MCV RBC AUTO: 91.9 FL (ref 80–99.9)
MONOCYTES NFR BLD: 0.38 K/UL (ref 0.1–0.95)
MONOCYTES NFR BLD: 8 % (ref 2–12)
NEUTROPHILS NFR BLD: 74 % (ref 43–80)
NEUTS SEG NFR BLD: 3.69 K/UL (ref 1.8–7.3)
PLATELET # BLD AUTO: 150 K/UL (ref 130–450)
PMV BLD AUTO: 10 FL (ref 7–12)
POTASSIUM SERPL-SCNC: 4.8 MMOL/L (ref 3.5–5)
PROT SERPL-MCNC: 7.9 G/DL (ref 6.4–8.3)
PSA SERPL-MCNC: 1.3 NG/ML (ref 0–4)
RBC # BLD AUTO: 4.07 M/UL (ref 3.8–5.8)
SODIUM SERPL-SCNC: 139 MMOL/L (ref 132–146)
WBC OTHER # BLD: 5 K/UL (ref 4.5–11.5)

## 2023-08-22 PROCEDURE — 36415 COLL VENOUS BLD VENIPUNCTURE: CPT

## 2023-08-22 PROCEDURE — G0103 PSA SCREENING: HCPCS

## 2023-08-22 PROCEDURE — 85025 COMPLETE CBC W/AUTO DIFF WBC: CPT

## 2023-08-22 PROCEDURE — 82378 CARCINOEMBRYONIC ANTIGEN: CPT

## 2023-08-22 PROCEDURE — 80053 COMPREHEN METABOLIC PANEL: CPT

## 2023-08-23 ENCOUNTER — HOSPITAL ENCOUNTER (OUTPATIENT)
Dept: INFUSION THERAPY | Age: 73
Discharge: HOME | End: 2023-08-23
Payer: MEDICARE

## 2023-08-23 ENCOUNTER — OFFICE VISIT (OUTPATIENT)
Dept: ONCOLOGY | Age: 73
End: 2023-08-23
Payer: MEDICARE

## 2023-08-23 VITALS
HEART RATE: 56 BPM | HEIGHT: 73 IN | WEIGHT: 232.2 LBS | OXYGEN SATURATION: 99 % | DIASTOLIC BLOOD PRESSURE: 79 MMHG | TEMPERATURE: 97.3 F | SYSTOLIC BLOOD PRESSURE: 131 MMHG | BODY MASS INDEX: 30.77 KG/M2

## 2023-08-23 DIAGNOSIS — C78.7 RECTAL CANCER METASTASIZED TO LIVER (HCC): Primary | ICD-10-CM

## 2023-08-23 DIAGNOSIS — C20 RECTAL CANCER METASTASIZED TO LIVER (HCC): Primary | ICD-10-CM

## 2023-08-23 PROCEDURE — 6360000002 HC RX W HCPCS: Performed by: INTERNAL MEDICINE

## 2023-08-23 PROCEDURE — 96523 IRRIG DRUG DELIVERY DEVICE: CPT

## 2023-08-23 PROCEDURE — 1123F ACP DISCUSS/DSCN MKR DOCD: CPT | Performed by: STUDENT IN AN ORGANIZED HEALTH CARE EDUCATION/TRAINING PROGRAM

## 2023-08-23 PROCEDURE — 99214 OFFICE O/P EST MOD 30 MIN: CPT | Performed by: STUDENT IN AN ORGANIZED HEALTH CARE EDUCATION/TRAINING PROGRAM

## 2023-08-23 PROCEDURE — 2580000003 HC RX 258: Performed by: INTERNAL MEDICINE

## 2023-08-23 RX ORDER — HEPARIN 100 UNIT/ML
500 SYRINGE INTRAVENOUS PRN
OUTPATIENT
Start: 2023-08-23

## 2023-08-23 RX ORDER — SODIUM CHLORIDE 0.9 % (FLUSH) 0.9 %
10 SYRINGE (ML) INJECTION PRN
OUTPATIENT
Start: 2023-08-23

## 2023-08-23 RX ORDER — WATER 1000 ML/1000ML
2.2 INJECTION, SOLUTION INTRAVENOUS ONCE
OUTPATIENT
Start: 2023-08-23 | End: 2023-08-23

## 2023-08-23 RX ORDER — WATER 1000 ML/1000ML
2.2 INJECTION, SOLUTION INTRAVENOUS ONCE
Status: CANCELLED | OUTPATIENT
Start: 2023-08-23 | End: 2023-08-23

## 2023-08-23 RX ORDER — HEPARIN 100 UNIT/ML
500 SYRINGE INTRAVENOUS PRN
Status: DISCONTINUED | OUTPATIENT
Start: 2023-08-23 | End: 2023-08-24 | Stop reason: HOSPADM

## 2023-08-23 RX ORDER — SODIUM CHLORIDE 0.9 % (FLUSH) 0.9 %
10 SYRINGE (ML) INJECTION PRN
Status: DISCONTINUED | OUTPATIENT
Start: 2023-08-23 | End: 2023-08-24 | Stop reason: HOSPADM

## 2023-08-23 RX ADMIN — Medication 500 UNITS: at 08:51

## 2023-08-23 RX ADMIN — SODIUM CHLORIDE, PRESERVATIVE FREE 10 ML: 5 INJECTION INTRAVENOUS at 08:51

## 2023-08-23 NOTE — PROGRESS NOTES
PORT FLUSH    Patient presents to clinic for [facility] today. Lef tchest  SQ port accessed per policy using 27I .78IW  Campos needle for good blood return. Aspirate for waste and specimen sent to lab. Site flushed easily with 10 mL NSS followed by 5 mL Heparin solution 100 units/ml rinse prior to de-access. Dry sterile dressing to area. Tolerated procedure well. Encouraged to schedule port flush every 4 weeks.

## 2023-08-23 NOTE — PROGRESS NOTES
along the right acetabulum suspicious for metastatic disease. Bone scan on 01/23/2017 Stable subtle uptake within the left proximal diaphysis, again suggestive of metastatic disease  Decreased subtle uptake within the medial right acetabulum. Decreased uptake within the left anterior sixth rib corresponding to linear sclerosis on the recent CT, favored to be related to an joint rib fracture. Continue FOLFIRI + Avastin and repeat scans in 3 months. Cycle # 13 FOLFIRI + Avastin was on 01/24/2017. Cycle # 14 FOLFIRI + Avastin was on 02/07/2017. Cycle # 15 FOLFIRI + Avastin was on 02/21/2017. Cycle # 16 FOLFIRI + Avastin was on 03/07/2017. Cycle # 17 FOLFIRI + Avastin was on 03/21/2017. Cycle # 18 FOLFIRI + Avastin was on 04/04/2017. CT chest 04/17/2017 negative for metastatic disease. CT abdomen/pelvis 04/17/2017 Stable hypodense metastatic lesions within the liver. Stable area of increased sclerosis along the right acetabulum  Bone scan 04/17/2017 Stable subtle uptake within the left proximal femoral diaphysis; No definite abnormal uptake in the region of a sclerotic lesion along the posterior column of the right acetabulum noted on CT. Stable slight uptake within the left anterior sixth rib corresponding to linear sclerosis on the recent CT, favored to be related to a fracture. Continue FOLFIRI + Avastin and repeat scans in 3 months. Cycle # 19 FOLFIRI + Avastin was on 04/18/2017. Cycle # 20 FOLFIRI + Avastin was on 05/02/2017. Cycle # 21 FOLFIRI + Avastin was on 05/16/2017. Cycle # 22 FOLFIRI + Avastin was on 05/30/2017. Cycle # 23 FOLFIRI + Avastin was on 06/13/2017. Cycle # 24 FOLFIRI + Avastin was on 06/27/2017. Cycle # 25 FOLFIRI + Avastin was on 07/11/2017. Cycle # 26 FOLFIRI + Avastin was on 07/25/2017. Cycle # 27 FOLFIRI + Avastin was on 08/08/2017. Cycle # 28 FOLFIRI + Avastin was on 08/22/2017. Cycle # 29 FOLFIRI + Avastin was on 09/05/2017.   Cycle # 30 FOLFIRI +

## 2023-09-06 ENCOUNTER — PROCEDURE VISIT (OUTPATIENT)
Dept: AUDIOLOGY | Age: 73
End: 2023-09-06

## 2023-09-06 DIAGNOSIS — H90.3 SENSORINEURAL HEARING LOSS, BILATERAL: Primary | ICD-10-CM

## 2023-09-06 PROCEDURE — 99024 POSTOP FOLLOW-UP VISIT: CPT | Performed by: AUDIOLOGIST

## 2023-09-19 ENCOUNTER — HOSPITAL ENCOUNTER (OUTPATIENT)
Age: 73
Discharge: HOME | End: 2023-09-19
Payer: MEDICARE

## 2023-09-19 DIAGNOSIS — C20 RECTAL CANCER METASTASIZED TO LIVER (HCC): ICD-10-CM

## 2023-09-19 DIAGNOSIS — C78.7 RECTAL CANCER METASTASIZED TO LIVER (HCC): ICD-10-CM

## 2023-09-19 LAB
ALBUMIN SERPL-MCNC: 4.4 G/DL (ref 3.5–5.2)
ALP SERPL-CCNC: 137 U/L (ref 40–129)
ALT SERPL-CCNC: 19 U/L (ref 0–40)
ANION GAP SERPL CALCULATED.3IONS-SCNC: 10 MMOL/L (ref 7–16)
AST SERPL-CCNC: 22 U/L (ref 0–39)
BASOPHILS # BLD: 0.03 K/UL (ref 0–0.2)
BASOPHILS NFR BLD: 1 % (ref 0–2)
BILIRUB SERPL-MCNC: 0.5 MG/DL (ref 0–1.2)
BUN SERPL-MCNC: 24 MG/DL (ref 6–23)
CALCIUM SERPL-MCNC: 9.8 MG/DL (ref 8.6–10.2)
CEA SERPL-MCNC: 1.7 NG/ML (ref 0–5.2)
CHLORIDE SERPL-SCNC: 106 MMOL/L (ref 98–107)
CO2 SERPL-SCNC: 24 MMOL/L (ref 22–29)
CREAT SERPL-MCNC: 1.3 MG/DL (ref 0.7–1.2)
EOSINOPHIL # BLD: 0.12 K/UL (ref 0.05–0.5)
EOSINOPHILS RELATIVE PERCENT: 2 % (ref 0–6)
ERYTHROCYTE [DISTWIDTH] IN BLOOD BY AUTOMATED COUNT: 13 % (ref 11.5–15)
GFR SERPL CREATININE-BSD FRML MDRD: 59 ML/MIN/1.73M2
GLUCOSE SERPL-MCNC: 103 MG/DL (ref 74–99)
HCT VFR BLD AUTO: 37.1 % (ref 37–54)
HGB BLD-MCNC: 12.7 G/DL (ref 12.5–16.5)
IMM GRANULOCYTES # BLD AUTO: <0.03 K/UL (ref 0–0.58)
IMM GRANULOCYTES NFR BLD: 0 % (ref 0–5)
LYMPHOCYTES NFR BLD: 0.81 K/UL (ref 1.5–4)
LYMPHOCYTES RELATIVE PERCENT: 16 % (ref 20–42)
MCH RBC QN AUTO: 31.1 PG (ref 26–35)
MCHC RBC AUTO-ENTMCNC: 34.2 G/DL (ref 32–34.5)
MCV RBC AUTO: 90.9 FL (ref 80–99.9)
MONOCYTES NFR BLD: 0.36 K/UL (ref 0.1–0.95)
MONOCYTES NFR BLD: 7 % (ref 2–12)
NEUTROPHILS NFR BLD: 73 % (ref 43–80)
NEUTS SEG NFR BLD: 3.69 K/UL (ref 1.8–7.3)
PLATELET # BLD AUTO: 139 K/UL (ref 130–450)
PMV BLD AUTO: 9.6 FL (ref 7–12)
POTASSIUM SERPL-SCNC: 4.4 MMOL/L (ref 3.5–5)
PROT SERPL-MCNC: 7.8 G/DL (ref 6.4–8.3)
RBC # BLD AUTO: 4.08 M/UL (ref 3.8–5.8)
SODIUM SERPL-SCNC: 140 MMOL/L (ref 132–146)
WBC OTHER # BLD: 5 K/UL (ref 4.5–11.5)

## 2023-09-19 PROCEDURE — 36415 COLL VENOUS BLD VENIPUNCTURE: CPT

## 2023-09-19 PROCEDURE — 85025 COMPLETE CBC W/AUTO DIFF WBC: CPT

## 2023-09-19 PROCEDURE — 82378 CARCINOEMBRYONIC ANTIGEN: CPT

## 2023-09-19 PROCEDURE — 80053 COMPREHEN METABOLIC PANEL: CPT

## 2023-09-20 ENCOUNTER — HOSPITAL ENCOUNTER (OUTPATIENT)
Dept: INFUSION THERAPY | Age: 73
Discharge: HOME | End: 2023-09-20
Payer: MEDICARE

## 2023-09-20 ENCOUNTER — OFFICE VISIT (OUTPATIENT)
Dept: ONCOLOGY | Age: 73
End: 2023-09-20
Payer: MEDICARE

## 2023-09-20 VITALS
HEIGHT: 73 IN | DIASTOLIC BLOOD PRESSURE: 66 MMHG | WEIGHT: 234.4 LBS | TEMPERATURE: 98.1 F | BODY MASS INDEX: 31.07 KG/M2 | OXYGEN SATURATION: 98 % | HEART RATE: 53 BPM | SYSTOLIC BLOOD PRESSURE: 125 MMHG

## 2023-09-20 DIAGNOSIS — C78.7 RECTAL CANCER METASTASIZED TO LIVER (HCC): Primary | ICD-10-CM

## 2023-09-20 DIAGNOSIS — C20 RECTAL CANCER METASTASIZED TO LIVER (HCC): Primary | ICD-10-CM

## 2023-09-20 PROCEDURE — 2580000003 HC RX 258: Performed by: INTERNAL MEDICINE

## 2023-09-20 PROCEDURE — 6360000002 HC RX W HCPCS: Performed by: INTERNAL MEDICINE

## 2023-09-20 PROCEDURE — 99214 OFFICE O/P EST MOD 30 MIN: CPT

## 2023-09-20 PROCEDURE — 1123F ACP DISCUSS/DSCN MKR DOCD: CPT | Performed by: STUDENT IN AN ORGANIZED HEALTH CARE EDUCATION/TRAINING PROGRAM

## 2023-09-20 PROCEDURE — 99214 OFFICE O/P EST MOD 30 MIN: CPT | Performed by: STUDENT IN AN ORGANIZED HEALTH CARE EDUCATION/TRAINING PROGRAM

## 2023-09-20 PROCEDURE — 96523 IRRIG DRUG DELIVERY DEVICE: CPT

## 2023-09-20 RX ORDER — WATER 1000 ML/1000ML
2.2 INJECTION, SOLUTION INTRAVENOUS ONCE
OUTPATIENT
Start: 2023-09-20 | End: 2023-09-20

## 2023-09-20 RX ORDER — SODIUM CHLORIDE 0.9 % (FLUSH) 0.9 %
10 SYRINGE (ML) INJECTION PRN
OUTPATIENT
Start: 2023-09-20

## 2023-09-20 RX ORDER — HEPARIN 100 UNIT/ML
500 SYRINGE INTRAVENOUS PRN
Status: DISCONTINUED | OUTPATIENT
Start: 2023-09-20 | End: 2023-09-21 | Stop reason: HOSPADM

## 2023-09-20 RX ORDER — HEPARIN 100 UNIT/ML
500 SYRINGE INTRAVENOUS PRN
OUTPATIENT
Start: 2023-09-20

## 2023-09-20 RX ORDER — SODIUM CHLORIDE 0.9 % (FLUSH) 0.9 %
10 SYRINGE (ML) INJECTION PRN
Status: DISCONTINUED | OUTPATIENT
Start: 2023-09-20 | End: 2023-09-21 | Stop reason: HOSPADM

## 2023-09-20 RX ADMIN — Medication 500 UNITS: at 08:47

## 2023-09-20 RX ADMIN — SODIUM CHLORIDE, PRESERVATIVE FREE 10 ML: 5 INJECTION INTRAVENOUS at 08:47

## 2023-09-20 NOTE — PROGRESS NOTES
PORT FLUSH    Patient presents to clinic for [facility] today. Left  SQ port accessed per policy using 45F 1.31HS Campos needle for good blood return. Aspirate for waste and specimen sent to lab. Site flushed easily with 10 mL NSS followed by 5 mL Heparin solution 100 units/ml rinse prior to de-access. Dry sterile dressing to area. Tolerated procedure well. Encouraged to schedule port flush every 4 weeks.

## 2023-09-21 ENCOUNTER — PROCEDURE VISIT (OUTPATIENT)
Dept: AUDIOLOGY | Age: 73
End: 2023-09-21

## 2023-09-21 DIAGNOSIS — H90.3 SENSORINEURAL HEARING LOSS, BILATERAL: Primary | ICD-10-CM

## 2023-09-21 PROCEDURE — 99024 POSTOP FOLLOW-UP VISIT: CPT | Performed by: AUDIOLOGIST

## 2023-09-21 NOTE — PROGRESS NOTES
Fit with binaural  [employer]  hearing aids. Instructed in use and care. Gave  battery charger, warranty information and scheduled  2 week check for 10/04/2023. Made following adjustments: decreased overall gain from 0337-2516 Hz, 3 clicks. Hearing aid contract/battery warning form reviewed and signed. Hearing aids paired to phone breana. Patient was satisfied and will follow up on above date, unless problems arise.      Radha Arthur CCC/JUAN  Audiologist  [id number]  NPI#:  [id number]      Electronically signed by Terri Romeo on 9/21/2023 at 1:20 PM

## 2023-10-16 ENCOUNTER — HOSPITAL ENCOUNTER (OUTPATIENT)
Dept: CT IMAGING | Age: 73
Discharge: HOME | End: 2023-10-16
Payer: MEDICARE

## 2023-10-16 DIAGNOSIS — C20 RECTAL CANCER METASTASIZED TO LIVER (HCC): ICD-10-CM

## 2023-10-16 DIAGNOSIS — C78.7 RECTAL CANCER METASTASIZED TO LIVER (HCC): ICD-10-CM

## 2023-10-16 PROCEDURE — 71260 CT THORAX DX C+: CPT

## 2023-10-16 PROCEDURE — 6360000004 HC RX CONTRAST MEDICATION: Performed by: RADIOLOGY

## 2023-10-16 PROCEDURE — 74177 CT ABD & PELVIS W/CONTRAST: CPT

## 2023-10-16 RX ADMIN — IOPAMIDOL 18 ML: 755 INJECTION, SOLUTION INTRAVENOUS at 08:31

## 2023-10-16 RX ADMIN — IOPAMIDOL 75 ML: 755 INJECTION, SOLUTION INTRAVENOUS at 08:32

## 2023-10-17 ENCOUNTER — HOSPITAL ENCOUNTER (OUTPATIENT)
Age: 73
Discharge: HOME | End: 2023-10-17
Payer: MEDICARE

## 2023-10-17 DIAGNOSIS — C20 RECTAL CANCER METASTASIZED TO LIVER (HCC): ICD-10-CM

## 2023-10-17 DIAGNOSIS — C78.7 RECTAL CANCER METASTASIZED TO LIVER (HCC): ICD-10-CM

## 2023-10-17 LAB
ALBUMIN SERPL-MCNC: 4.8 G/DL (ref 3.5–5.2)
ALP SERPL-CCNC: 128 U/L (ref 40–129)
ALT SERPL-CCNC: 23 U/L (ref 0–40)
ANION GAP SERPL CALCULATED.3IONS-SCNC: 10 MMOL/L (ref 7–16)
AST SERPL-CCNC: 23 U/L (ref 0–39)
BASOPHILS # BLD: 0.04 K/UL (ref 0–0.2)
BASOPHILS NFR BLD: 0 % (ref 0–2)
BILIRUB SERPL-MCNC: 1.1 MG/DL (ref 0–1.2)
BUN SERPL-MCNC: 25 MG/DL (ref 6–23)
CALCIUM SERPL-MCNC: 9.9 MG/DL (ref 8.6–10.2)
CEA SERPL-MCNC: 2.1 NG/ML (ref 0–5.2)
CHLORIDE SERPL-SCNC: 104 MMOL/L (ref 98–107)
CO2 SERPL-SCNC: 26 MMOL/L (ref 22–29)
CREAT SERPL-MCNC: 1.4 MG/DL (ref 0.7–1.2)
EOSINOPHIL # BLD: 0.11 K/UL (ref 0.05–0.5)
EOSINOPHILS RELATIVE PERCENT: 1 % (ref 0–6)
ERYTHROCYTE [DISTWIDTH] IN BLOOD BY AUTOMATED COUNT: 13 % (ref 11.5–15)
GFR SERPL CREATININE-BSD FRML MDRD: 51 ML/MIN/1.73M2
GLUCOSE SERPL-MCNC: 129 MG/DL (ref 74–99)
HCT VFR BLD AUTO: 37.6 % (ref 37–54)
HGB BLD-MCNC: 13 G/DL (ref 12.5–16.5)
IMM GRANULOCYTES # BLD AUTO: 0.05 K/UL (ref 0–0.58)
IMM GRANULOCYTES NFR BLD: 0 % (ref 0–5)
LYMPHOCYTES NFR BLD: 0.68 K/UL (ref 1.5–4)
LYMPHOCYTES RELATIVE PERCENT: 6 % (ref 20–42)
MCH RBC QN AUTO: 31.7 PG (ref 26–35)
MCHC RBC AUTO-ENTMCNC: 34.6 G/DL (ref 32–34.5)
MCV RBC AUTO: 91.7 FL (ref 80–99.9)
MONOCYTES NFR BLD: 0.57 K/UL (ref 0.1–0.95)
MONOCYTES NFR BLD: 5 % (ref 2–12)
NEUTROPHILS NFR BLD: 88 % (ref 43–80)
NEUTS SEG NFR BLD: 10.8 K/UL (ref 1.8–7.3)
PLATELET # BLD AUTO: 147 K/UL (ref 130–450)
PMV BLD AUTO: 9.5 FL (ref 7–12)
POTASSIUM SERPL-SCNC: 4.6 MMOL/L (ref 3.5–5)
PROT SERPL-MCNC: 8 G/DL (ref 6.4–8.3)
RBC # BLD AUTO: 4.1 M/UL (ref 3.8–5.8)
SODIUM SERPL-SCNC: 140 MMOL/L (ref 132–146)
WBC OTHER # BLD: 12.3 K/UL (ref 4.5–11.5)

## 2023-10-17 PROCEDURE — 80053 COMPREHEN METABOLIC PANEL: CPT

## 2023-10-17 PROCEDURE — 82378 CARCINOEMBRYONIC ANTIGEN: CPT

## 2023-10-17 PROCEDURE — 85025 COMPLETE CBC W/AUTO DIFF WBC: CPT

## 2023-10-17 PROCEDURE — 36415 COLL VENOUS BLD VENIPUNCTURE: CPT

## 2023-10-18 ENCOUNTER — OFFICE VISIT (OUTPATIENT)
Dept: ONCOLOGY | Age: 73
End: 2023-10-18
Payer: MEDICARE

## 2023-10-18 ENCOUNTER — HOSPITAL ENCOUNTER (OUTPATIENT)
Dept: INFUSION THERAPY | Age: 73
Discharge: HOME | End: 2023-10-18
Payer: MEDICARE

## 2023-10-18 ENCOUNTER — PROCEDURE VISIT (OUTPATIENT)
Dept: AUDIOLOGY | Age: 73
End: 2023-10-18
Payer: MEDICARE

## 2023-10-18 VITALS
WEIGHT: 231.4 LBS | TEMPERATURE: 97.2 F | SYSTOLIC BLOOD PRESSURE: 120 MMHG | DIASTOLIC BLOOD PRESSURE: 74 MMHG | HEART RATE: 57 BPM | BODY MASS INDEX: 30.53 KG/M2 | OXYGEN SATURATION: 98 %

## 2023-10-18 DIAGNOSIS — C78.7 RECTAL CANCER METASTASIZED TO LIVER (HCC): Primary | ICD-10-CM

## 2023-10-18 DIAGNOSIS — H90.3 SENSORINEURAL HEARING LOSS, BILATERAL: Primary | ICD-10-CM

## 2023-10-18 DIAGNOSIS — C20 RECTAL CANCER (HCC): Primary | ICD-10-CM

## 2023-10-18 DIAGNOSIS — C20 RECTAL CANCER METASTASIZED TO LIVER (HCC): Primary | ICD-10-CM

## 2023-10-18 PROCEDURE — 1123F ACP DISCUSS/DSCN MKR DOCD: CPT | Performed by: STUDENT IN AN ORGANIZED HEALTH CARE EDUCATION/TRAINING PROGRAM

## 2023-10-18 PROCEDURE — 96523 IRRIG DRUG DELIVERY DEVICE: CPT

## 2023-10-18 PROCEDURE — V5140 BEHIND EAR BINAUR HEARING AI: HCPCS | Performed by: AUDIOLOGIST

## 2023-10-18 PROCEDURE — 2580000003 HC RX 258: Performed by: INTERNAL MEDICINE

## 2023-10-18 PROCEDURE — 6360000002 HC RX W HCPCS: Performed by: INTERNAL MEDICINE

## 2023-10-18 PROCEDURE — V5160 DISPENSING FEE BINAURAL: HCPCS | Performed by: AUDIOLOGIST

## 2023-10-18 PROCEDURE — 99214 OFFICE O/P EST MOD 30 MIN: CPT

## 2023-10-18 PROCEDURE — 99213 OFFICE O/P EST LOW 20 MIN: CPT | Performed by: STUDENT IN AN ORGANIZED HEALTH CARE EDUCATION/TRAINING PROGRAM

## 2023-10-18 RX ORDER — SODIUM CHLORIDE 0.9 % (FLUSH) 0.9 %
10 SYRINGE (ML) INJECTION PRN
Status: DISCONTINUED | OUTPATIENT
Start: 2023-10-18 | End: 2023-10-19 | Stop reason: HOSPADM

## 2023-10-18 RX ORDER — HEPARIN 100 UNIT/ML
500 SYRINGE INTRAVENOUS PRN
OUTPATIENT
Start: 2023-10-18

## 2023-10-18 RX ORDER — WATER 1000 ML/1000ML
2.2 INJECTION, SOLUTION INTRAVENOUS ONCE
OUTPATIENT
Start: 2023-10-18 | End: 2023-10-18

## 2023-10-18 RX ORDER — SODIUM CHLORIDE 0.9 % (FLUSH) 0.9 %
10 SYRINGE (ML) INJECTION PRN
OUTPATIENT
Start: 2023-10-18

## 2023-10-18 RX ORDER — HEPARIN 100 UNIT/ML
500 SYRINGE INTRAVENOUS PRN
Status: DISCONTINUED | OUTPATIENT
Start: 2023-10-18 | End: 2023-10-19 | Stop reason: HOSPADM

## 2023-10-18 RX ADMIN — Medication 500 UNITS: at 08:50

## 2023-10-18 RX ADMIN — SODIUM CHLORIDE, PRESERVATIVE FREE 10 ML: 5 INJECTION INTRAVENOUS at 08:49

## 2023-10-18 NOTE — PROGRESS NOTES
PORT FLUSH    Patient presents to clinic for [facility] today. L chest  SQ port accessed per policy using 20 G, 1.00 inches Campos needle for good blood return. Site flushed easily with 10 mL NSS followed by 5 mL Heparin solution 100 units/ml rinse prior to de-access. Dry sterile dressing to area. Tolerated procedure well. Encouraged to schedule port flush every 4 weeks.

## 2023-10-18 NOTE — PROGRESS NOTES
The patient came in for a 30 day hearing aid follow up. Patient reported they are doing well with the hearing aids. and they have chosen to keep the them. Changes to hearing aids  today: N/A. Counseled patient on: N/A. Patient is satisfied with the hearing aids and billing will be done today as a self-pay patient. No insurance coverage for hearing aids through [facility] patient has agreed upon payment and understands. Patient to return as needed. Patient paid balance in full, with [facility].     Zeyad Dickson CCC/JUAN  Audiologist  [id number]  NPI#:  [id number]      Electronically signed by Terri Hoyt on 10/18/2023 at 9:46 AM

## 2023-10-18 NOTE — PROGRESS NOTES
File Prior to Visit   Medication Sig Dispense Refill    Misc Natural Products (OSTEO BI-FLEX JOINT SHIELD PO) Take by mouth      amLODIPine (NORVASC) 5 MG tablet TAKE 1 TABLET BY MOUTH EVERY DAY      hydrocortisone 2.5 % cream       ferrous gluconate (FERGON) 324 (38 Fe) MG tablet Take 1 tablet by mouth daily (with breakfast)      traMADol (ULTRAM) 50 MG tablet       tamsulosin (FLOMAX) 0.4 MG capsule Take 2 capsules by mouth daily (Patient taking differently: Take 1 capsule by mouth 2 times daily) 30 capsule 0    ondansetron (ZOFRAN) 4 MG tablet Take 1 tablet by mouth every 8 hours as needed for Nausea or Vomiting 40 tablet 1    lactulose (CHRONULAC) 10 GM/15ML solution Take 30 mLs by mouth as needed      furosemide (LASIX) 20 MG tablet Take 1 tablet by mouth daily Indications: pt is taken this med 7 days a week for now      magnesium oxide (MAG-OX) 400 (240 Mg) MG tablet Take 1 tablet by mouth 2 times daily (Patient taking differently: Take 1 tablet by mouth daily) 30 tablet 0    potassium chloride (KLOR-CON M) 20 MEQ extended release tablet Take 1 tablet by mouth daily Mon, Wed, friday      loratadine (CLARITIN) 10 MG tablet Take 1 tablet by mouth See Admin Instructions Takes the week of chemotherapy (Last dose: 5/9; Next dose: 5/26)      pantoprazole (PROTONIX) 40 MG tablet Take 1 tablet by mouth daily      b complex vitamins capsule Take 1 capsule by mouth daily      diphenoxylate-atropine (LOMOTIL) 2.5-0.025 MG per tablet Take 1 tablet by mouth 4 times daily as needed for Diarrhea.      polyethylene glycol (GLYCOLAX) packet Take 1 packet by mouth daily as needed for Constipation      hydrochlorothiazide (HYDRODIURIL) 25 MG tablet Take 1 tablet by mouth daily 30 tablet 3    hydrocortisone (ANUSOL-HC) 2.5 % rectal cream Use 3-4 times daily. Do not use internally. External use only.  1 Tube 2    acetaminophen (TYLENOL) 500 MG tablet Take 1 tablet by mouth every 6 hours as needed for Pain       No current

## 2023-11-13 ENCOUNTER — HOSPITAL ENCOUNTER (OUTPATIENT)
Age: 73
Discharge: HOME | End: 2023-11-13
Payer: MEDICARE

## 2023-11-13 DIAGNOSIS — C20 RECTAL CANCER METASTASIZED TO LIVER (HCC): ICD-10-CM

## 2023-11-13 DIAGNOSIS — C78.7 RECTAL CANCER METASTASIZED TO LIVER (HCC): ICD-10-CM

## 2023-11-13 LAB
ALBUMIN SERPL-MCNC: 4.2 G/DL (ref 3.5–5.2)
ALP SERPL-CCNC: 132 U/L (ref 40–129)
ALT SERPL-CCNC: 19 U/L (ref 0–40)
ANION GAP SERPL CALCULATED.3IONS-SCNC: 8 MMOL/L (ref 7–16)
AST SERPL-CCNC: 22 U/L (ref 0–39)
BASOPHILS # BLD: 0.03 K/UL (ref 0–0.2)
BASOPHILS NFR BLD: 1 % (ref 0–2)
BILIRUB SERPL-MCNC: 0.5 MG/DL (ref 0–1.2)
BUN SERPL-MCNC: 24 MG/DL (ref 6–23)
CALCIUM SERPL-MCNC: 9.4 MG/DL (ref 8.6–10.2)
CEA SERPL-MCNC: 1.7 NG/ML (ref 0–5.2)
CHLORIDE SERPL-SCNC: 107 MMOL/L (ref 98–107)
CO2 SERPL-SCNC: 25 MMOL/L (ref 22–29)
CREAT SERPL-MCNC: 1.2 MG/DL (ref 0.7–1.2)
EOSINOPHIL # BLD: 0.12 K/UL (ref 0.05–0.5)
EOSINOPHILS RELATIVE PERCENT: 2 % (ref 0–6)
ERYTHROCYTE [DISTWIDTH] IN BLOOD BY AUTOMATED COUNT: 13 % (ref 11.5–15)
GFR SERPL CREATININE-BSD FRML MDRD: >60 ML/MIN/1.73M2
GLUCOSE SERPL-MCNC: 101 MG/DL (ref 74–99)
HCT VFR BLD AUTO: 37.6 % (ref 37–54)
HGB BLD-MCNC: 12.9 G/DL (ref 12.5–16.5)
IMM GRANULOCYTES # BLD AUTO: 0.04 K/UL (ref 0–0.58)
IMM GRANULOCYTES NFR BLD: 1 % (ref 0–5)
LYMPHOCYTES NFR BLD: 0.83 K/UL (ref 1.5–4)
LYMPHOCYTES RELATIVE PERCENT: 14 % (ref 20–42)
MCH RBC QN AUTO: 31.7 PG (ref 26–35)
MCHC RBC AUTO-ENTMCNC: 34.3 G/DL (ref 32–34.5)
MCV RBC AUTO: 92.4 FL (ref 80–99.9)
MONOCYTES NFR BLD: 0.39 K/UL (ref 0.1–0.95)
MONOCYTES NFR BLD: 6 % (ref 2–12)
NEUTROPHILS NFR BLD: 77 % (ref 43–80)
NEUTS SEG NFR BLD: 4.66 K/UL (ref 1.8–7.3)
PLATELET # BLD AUTO: 147 K/UL (ref 130–450)
PMV BLD AUTO: 10.1 FL (ref 7–12)
POTASSIUM SERPL-SCNC: 4.4 MMOL/L (ref 3.5–5)
PROT SERPL-MCNC: 7.3 G/DL (ref 6.4–8.3)
RBC # BLD AUTO: 4.07 M/UL (ref 3.8–5.8)
SODIUM SERPL-SCNC: 140 MMOL/L (ref 132–146)
WBC OTHER # BLD: 6.1 K/UL (ref 4.5–11.5)

## 2023-11-13 PROCEDURE — 80053 COMPREHEN METABOLIC PANEL: CPT

## 2023-11-13 PROCEDURE — 85025 COMPLETE CBC W/AUTO DIFF WBC: CPT

## 2023-11-13 PROCEDURE — 36415 COLL VENOUS BLD VENIPUNCTURE: CPT

## 2023-11-13 PROCEDURE — 82378 CARCINOEMBRYONIC ANTIGEN: CPT

## 2023-11-15 ENCOUNTER — OFFICE VISIT (OUTPATIENT)
Dept: ONCOLOGY | Age: 73
End: 2023-11-15
Payer: MEDICARE

## 2023-11-15 ENCOUNTER — HOSPITAL ENCOUNTER (OUTPATIENT)
Dept: INFUSION THERAPY | Age: 73
Discharge: HOME | End: 2023-11-15
Payer: MEDICARE

## 2023-11-15 VITALS
BODY MASS INDEX: 31.2 KG/M2 | DIASTOLIC BLOOD PRESSURE: 88 MMHG | OXYGEN SATURATION: 96 % | SYSTOLIC BLOOD PRESSURE: 146 MMHG | TEMPERATURE: 97.7 F | HEIGHT: 73 IN | HEART RATE: 53 BPM | WEIGHT: 235.4 LBS

## 2023-11-15 DIAGNOSIS — C20 RECTAL CANCER METASTASIZED TO LIVER (HCC): Primary | ICD-10-CM

## 2023-11-15 DIAGNOSIS — C78.7 RECTAL CANCER METASTASIZED TO LIVER (HCC): Primary | ICD-10-CM

## 2023-11-15 PROCEDURE — 2580000003 HC RX 258: Performed by: INTERNAL MEDICINE

## 2023-11-15 PROCEDURE — 99214 OFFICE O/P EST MOD 30 MIN: CPT

## 2023-11-15 PROCEDURE — 99213 OFFICE O/P EST LOW 20 MIN: CPT | Performed by: STUDENT IN AN ORGANIZED HEALTH CARE EDUCATION/TRAINING PROGRAM

## 2023-11-15 PROCEDURE — 1123F ACP DISCUSS/DSCN MKR DOCD: CPT | Performed by: STUDENT IN AN ORGANIZED HEALTH CARE EDUCATION/TRAINING PROGRAM

## 2023-11-15 PROCEDURE — 6360000002 HC RX W HCPCS: Performed by: INTERNAL MEDICINE

## 2023-11-15 RX ORDER — SODIUM CHLORIDE 0.9 % (FLUSH) 0.9 %
10 SYRINGE (ML) INJECTION PRN
Status: DISCONTINUED | OUTPATIENT
Start: 2023-11-15 | End: 2023-11-16 | Stop reason: HOSPADM

## 2023-11-15 RX ORDER — SODIUM CHLORIDE 0.9 % (FLUSH) 0.9 %
10 SYRINGE (ML) INJECTION PRN
OUTPATIENT
Start: 2023-11-15

## 2023-11-15 RX ORDER — WATER 10 ML/10ML
2.2 INJECTION INTRAMUSCULAR; INTRAVENOUS; SUBCUTANEOUS ONCE
OUTPATIENT
Start: 2023-11-15 | End: 2023-11-15

## 2023-11-15 RX ORDER — HEPARIN 100 UNIT/ML
500 SYRINGE INTRAVENOUS PRN
OUTPATIENT
Start: 2023-11-15

## 2023-11-15 RX ORDER — HEPARIN 100 UNIT/ML
500 SYRINGE INTRAVENOUS PRN
Status: DISCONTINUED | OUTPATIENT
Start: 2023-11-15 | End: 2023-11-16 | Stop reason: HOSPADM

## 2023-11-15 RX ADMIN — Medication 500 UNITS: at 08:19

## 2023-11-15 RX ADMIN — SODIUM CHLORIDE, PRESERVATIVE FREE 10 ML: 5 INJECTION INTRAVENOUS at 08:18

## 2023-11-15 NOTE — PROGRESS NOTES
reconciled. Allergies   Allergen Reactions    Neosporin [Neomycin-Polymyxin-Gramicidin] Rash    Tape [place] Tape] Rash          Physical Exam:  There were no vitals taken for this visit. GENERAL: Well developed, well nourished; in no acute distress  HEENT:   - Head: Nornmocephalic, artaumatic.    - Eyes:  EOMI. No scleral icterus.  - Oral: Oropharynx clear. NECK:  Supple. Normal range of motion  ABDOMEN:  Soft, non tender, non distended. No ascites. Bowel sounds present. EXTREMITIES: without clubbing, cyanosis, or edema. NEUROLOGIC:  Alert, awake, oriented to time, place and person. No focal deficits. SKIN : No Rash. No Bruising. ECOG PS 1    Diagnostics:  Lab Results   Component Value Date    WBC 6.1 11/13/2023    HGB 12.9 11/13/2023    HCT 37.6 11/13/2023    MCV 92.4 11/13/2023     11/13/2023     Lab Results   Component Value Date     11/13/2023    K 4.4 11/13/2023     11/13/2023    CO2 25 11/13/2023    BUN 24 (H) 11/13/2023    CREATININE 1.2 11/13/2023    GLUCOSE 101 (H) 11/13/2023    CALCIUM 9.4 11/13/2023    PROT 7.3 11/13/2023    LABALBU 4.2 11/13/2023    BILITOT 0.5 11/13/2023    ALKPHOS 132 (H) 11/13/2023    AST 22 11/13/2023    ALT 19 11/13/2023    LABGLOM >60 11/13/2023    GFRAA >60 09/26/2022     Lab Results   Component Value Date    CEA 1.7 11/13/2023     MSI testing noted no mismatch repair protein loss of expression  NGS testing ([employer])   MS-Stable: No therapies or clinical trials  TMB 3Muts/Mb: No therapies or clinical trials  KRAS G12R: Therapies with clinical relevance in patient's tumor type: Cetuximab, Panitumumab  Therapies with clinical relevance in other tumor type: None  NRAS wild type:  Therapies with clinical relevance in patient's tumor type: Cetuximab, Panitumumab  Therapies with clinical relevance in other tumor type: None  APC: None None  MARIELA: None None          ASSESSMENT:    Metastatic rectal cancer, without evidence of disease since

## 2023-11-15 NOTE — PROGRESS NOTES
PORT FLUSH    Patient presents to clinic for [facility] today. L chest  SQ port accessed per policy using 20 G, 9.61 inches Campos needle for good blood return. Site flushed easily with 10 mL NSS followed by 5 mL Heparin solution 100 units/ml rinse prior to de-access. Dry sterile dressing to area. Tolerated procedure well. Encouraged to schedule port flush every 4 weeks.

## 2023-12-28 ENCOUNTER — HOSPITAL ENCOUNTER (OUTPATIENT)
Age: 73
Discharge: HOME | End: 2023-12-28
Payer: MEDICARE

## 2023-12-28 DIAGNOSIS — C78.7 RECTAL CANCER METASTASIZED TO LIVER (HCC): ICD-10-CM

## 2023-12-28 DIAGNOSIS — C20 RECTAL CANCER METASTASIZED TO LIVER (HCC): ICD-10-CM

## 2023-12-28 LAB
ALBUMIN SERPL-MCNC: 4.3 G/DL (ref 3.5–5.2)
ALP SERPL-CCNC: 133 U/L (ref 40–129)
ALT SERPL-CCNC: 21 U/L (ref 0–40)
ANION GAP SERPL CALCULATED.3IONS-SCNC: 12 MMOL/L (ref 7–16)
AST SERPL-CCNC: 21 U/L (ref 0–39)
BASOPHILS # BLD: 0.04 K/UL (ref 0–0.2)
BASOPHILS NFR BLD: 1 % (ref 0–2)
BILIRUB SERPL-MCNC: 0.5 MG/DL (ref 0–1.2)
BUN SERPL-MCNC: 30 MG/DL (ref 6–23)
CALCIUM SERPL-MCNC: 9.4 MG/DL (ref 8.6–10.2)
CEA SERPL-MCNC: 1.7 NG/ML (ref 0–5.2)
CHLORIDE SERPL-SCNC: 106 MMOL/L (ref 98–107)
CO2 SERPL-SCNC: 22 MMOL/L (ref 22–29)
CREAT SERPL-MCNC: 1.3 MG/DL (ref 0.7–1.2)
EOSINOPHIL # BLD: 0.11 K/UL (ref 0.05–0.5)
EOSINOPHILS RELATIVE PERCENT: 2 % (ref 0–6)
ERYTHROCYTE [DISTWIDTH] IN BLOOD BY AUTOMATED COUNT: 12.8 % (ref 11.5–15)
GFR SERPL CREATININE-BSD FRML MDRD: 58 ML/MIN/1.73M2
GLUCOSE SERPL-MCNC: 99 MG/DL (ref 74–99)
HCT VFR BLD AUTO: 37.3 % (ref 37–54)
HGB BLD-MCNC: 12.7 G/DL (ref 12.5–16.5)
IMM GRANULOCYTES # BLD AUTO: 0.06 K/UL (ref 0–0.58)
IMM GRANULOCYTES NFR BLD: 1 % (ref 0–5)
LYMPHOCYTES NFR BLD: 0.96 K/UL (ref 1.5–4)
LYMPHOCYTES RELATIVE PERCENT: 15 % (ref 20–42)
MCH RBC QN AUTO: 31.4 PG (ref 26–35)
MCHC RBC AUTO-ENTMCNC: 34 G/DL (ref 32–34.5)
MCV RBC AUTO: 92.1 FL (ref 80–99.9)
MONOCYTES NFR BLD: 0.41 K/UL (ref 0.1–0.95)
MONOCYTES NFR BLD: 6 % (ref 2–12)
NEUTROPHILS NFR BLD: 76 % (ref 43–80)
NEUTS SEG NFR BLD: 5.01 K/UL (ref 1.8–7.3)
PLATELET # BLD AUTO: 167 K/UL (ref 130–450)
PMV BLD AUTO: 10 FL (ref 7–12)
POTASSIUM SERPL-SCNC: 4.3 MMOL/L (ref 3.5–5)
PROT SERPL-MCNC: 7.6 G/DL (ref 6.4–8.3)
RBC # BLD AUTO: 4.05 M/UL (ref 3.8–5.8)
SODIUM SERPL-SCNC: 140 MMOL/L (ref 132–146)
WBC OTHER # BLD: 6.6 K/UL (ref 4.5–11.5)

## 2023-12-28 PROCEDURE — 36415 COLL VENOUS BLD VENIPUNCTURE: CPT

## 2023-12-28 PROCEDURE — 80053 COMPREHEN METABOLIC PANEL: CPT

## 2023-12-28 PROCEDURE — 82378 CARCINOEMBRYONIC ANTIGEN: CPT

## 2023-12-28 PROCEDURE — 85025 COMPLETE CBC W/AUTO DIFF WBC: CPT

## 2024-01-17 ENCOUNTER — HOSPITAL ENCOUNTER (OUTPATIENT)
Dept: INFUSION THERAPY | Age: 74
Discharge: HOME | End: 2024-01-17
Payer: MEDICARE

## 2024-01-17 ENCOUNTER — OFFICE VISIT (OUTPATIENT)
Dept: ONCOLOGY | Age: 74
End: 2024-01-17
Payer: MEDICARE

## 2024-01-17 VITALS
WEIGHT: 235.4 LBS | OXYGEN SATURATION: 98 % | DIASTOLIC BLOOD PRESSURE: 87 MMHG | TEMPERATURE: 97.5 F | HEIGHT: 73 IN | BODY MASS INDEX: 31.2 KG/M2 | HEART RATE: 70 BPM | SYSTOLIC BLOOD PRESSURE: 164 MMHG

## 2024-01-17 DIAGNOSIS — C20 RECTAL CANCER METASTASIZED TO LIVER (HCC): Primary | ICD-10-CM

## 2024-01-17 DIAGNOSIS — C78.7 RECTAL CANCER METASTASIZED TO LIVER (HCC): Primary | ICD-10-CM

## 2024-01-17 PROCEDURE — 2580000003 HC RX 258: Performed by: INTERNAL MEDICINE

## 2024-01-17 PROCEDURE — 96523 IRRIG DRUG DELIVERY DEVICE: CPT

## 2024-01-17 PROCEDURE — 6360000002 HC RX W HCPCS: Performed by: INTERNAL MEDICINE

## 2024-01-17 PROCEDURE — 99213 OFFICE O/P EST LOW 20 MIN: CPT | Performed by: STUDENT IN AN ORGANIZED HEALTH CARE EDUCATION/TRAINING PROGRAM

## 2024-01-17 PROCEDURE — 99214 OFFICE O/P EST MOD 30 MIN: CPT

## 2024-01-17 PROCEDURE — 1123F ACP DISCUSS/DSCN MKR DOCD: CPT | Performed by: STUDENT IN AN ORGANIZED HEALTH CARE EDUCATION/TRAINING PROGRAM

## 2024-01-17 RX ORDER — WATER 10 ML/10ML
2.2 INJECTION INTRAMUSCULAR; INTRAVENOUS; SUBCUTANEOUS ONCE
OUTPATIENT
Start: 2024-01-17 | End: 2024-01-17

## 2024-01-17 RX ORDER — SODIUM CHLORIDE 0.9 % (FLUSH) 0.9 %
10 SYRINGE (ML) INJECTION PRN
OUTPATIENT
Start: 2024-01-17

## 2024-01-17 RX ORDER — HEPARIN 100 UNIT/ML
500 SYRINGE INTRAVENOUS PRN
OUTPATIENT
Start: 2024-01-17

## 2024-01-17 RX ORDER — HEPARIN 100 UNIT/ML
500 SYRINGE INTRAVENOUS PRN
Status: DISCONTINUED | OUTPATIENT
Start: 2024-01-17 | End: 2024-01-18 | Stop reason: HOSPADM

## 2024-01-17 RX ORDER — SODIUM CHLORIDE 0.9 % (FLUSH) 0.9 %
10 SYRINGE (ML) INJECTION PRN
Status: DISCONTINUED | OUTPATIENT
Start: 2024-01-17 | End: 2024-01-18 | Stop reason: HOSPADM

## 2024-01-17 RX ADMIN — SODIUM CHLORIDE, PRESERVATIVE FREE 10 ML: 5 INJECTION INTRAVENOUS at 08:38

## 2024-01-17 RX ADMIN — Medication 500 UNITS: at 08:38

## 2024-01-17 NOTE — PROGRESS NOTES
Take by mouth      amLODIPine (NORVASC) 5 MG tablet TAKE 1 TABLET BY MOUTH EVERY DAY      hydrocortisone 2.5 % cream       ferrous gluconate (FERGON) 324 (38 Fe) MG tablet Take 1 tablet by mouth daily (with breakfast)      traMADol (ULTRAM) 50 MG tablet       tamsulosin (FLOMAX) 0.4 MG capsule Take 2 capsules by mouth daily (Patient taking differently: Take 1 capsule by mouth 2 times daily) 30 capsule 0    ondansetron (ZOFRAN) 4 MG tablet Take 1 tablet by mouth every 8 hours as needed for Nausea or Vomiting 40 tablet 1    lactulose (CHRONULAC) 10 GM/15ML solution Take 30 mLs by mouth as needed      furosemide (LASIX) 20 MG tablet Take 1 tablet by mouth daily Indications: pt is taken this med 7 days a week for now      magnesium oxide (MAG-OX) 400 (240 Mg) MG tablet Take 1 tablet by mouth 2 times daily (Patient taking differently: Take 1 tablet by mouth daily) 30 tablet 0    potassium chloride (KLOR-CON M) 20 MEQ extended release tablet Take 1 tablet by mouth daily Mon, Wed, friday      loratadine (CLARITIN) 10 MG tablet Take 1 tablet by mouth See Admin Instructions Takes the week of chemotherapy (Last dose: 5/9; Next dose: 5/26)      pantoprazole (PROTONIX) 40 MG tablet Take 1 tablet by mouth daily      b complex vitamins capsule Take 1 capsule by mouth daily      diphenoxylate-atropine (LOMOTIL) 2.5-0.025 MG per tablet Take 1 tablet by mouth 4 times daily as needed for Diarrhea.      polyethylene glycol (GLYCOLAX) packet Take 1 packet by mouth daily as needed for Constipation      hydrochlorothiazide (HYDRODIURIL) 25 MG tablet Take 1 tablet by mouth daily 30 tablet 3    hydrocortisone (ANUSOL-HC) 2.5 % rectal cream Use 3-4 times daily.  Do not use internally.  External use only. 1 Tube 2    acetaminophen (TYLENOL) 500 MG tablet Take 1 tablet by mouth every 6 hours as needed for Pain       No current facility-administered medications on file prior to visit.   Reviewed and reconciled.     Allergies   Allergen

## 2024-01-17 NOTE — PROGRESS NOTES
PORT FLUSH    Patient presents to clinic for Port Flush today.  Left  SQ port accessed per policy using 20g 0.75in Campos needle for good blood return.  Site flushed easily with 10 mL NSS followed by 5 mL Heparin solution 100 units/ml rinse prior to de-access.  Dry sterile dressing to area.  Tolerated procedure well.  Encouraged to schedule port flush every 4 weeks.

## 2024-04-22 ENCOUNTER — HOSPITAL ENCOUNTER (OUTPATIENT)
Age: 74
Discharge: HOME | End: 2024-04-22
Payer: MEDICARE

## 2024-04-22 DIAGNOSIS — C78.7 RECTAL CANCER METASTASIZED TO LIVER (HCC): ICD-10-CM

## 2024-04-22 DIAGNOSIS — C20 RECTAL CANCER METASTASIZED TO LIVER (HCC): ICD-10-CM

## 2024-04-22 LAB
ALBUMIN SERPL-MCNC: 4.5 G/DL (ref 3.5–5.2)
ALP SERPL-CCNC: 125 U/L (ref 40–129)
ALT SERPL-CCNC: 25 U/L (ref 0–40)
ANION GAP SERPL CALCULATED.3IONS-SCNC: 13 MMOL/L (ref 7–16)
AST SERPL-CCNC: 28 U/L (ref 0–39)
BASOPHILS # BLD: 0.04 K/UL (ref 0–0.2)
BASOPHILS NFR BLD: 1 % (ref 0–2)
BILIRUB SERPL-MCNC: 0.4 MG/DL (ref 0–1.2)
BUN SERPL-MCNC: 31 MG/DL (ref 6–23)
CALCIUM SERPL-MCNC: 9.2 MG/DL (ref 8.6–10.2)
CEA SERPL-MCNC: 1.9 NG/ML (ref 0–5.2)
CHLORIDE SERPL-SCNC: 104 MMOL/L (ref 98–107)
CO2 SERPL-SCNC: 23 MMOL/L (ref 22–29)
CREAT SERPL-MCNC: 1.2 MG/DL (ref 0.7–1.2)
EOSINOPHIL # BLD: 0.14 K/UL (ref 0.05–0.5)
EOSINOPHILS RELATIVE PERCENT: 2 % (ref 0–6)
ERYTHROCYTE [DISTWIDTH] IN BLOOD BY AUTOMATED COUNT: 12.5 % (ref 11.5–15)
GFR SERPL CREATININE-BSD FRML MDRD: 63 ML/MIN/1.73M2
GLUCOSE SERPL-MCNC: 121 MG/DL (ref 74–99)
HCT VFR BLD AUTO: 37.2 % (ref 37–54)
HGB BLD-MCNC: 12.9 G/DL (ref 12.5–16.5)
IMM GRANULOCYTES # BLD AUTO: 0.05 K/UL (ref 0–0.58)
IMM GRANULOCYTES NFR BLD: 1 % (ref 0–5)
LYMPHOCYTES NFR BLD: 1.02 K/UL (ref 1.5–4)
LYMPHOCYTES RELATIVE PERCENT: 18 % (ref 20–42)
MCH RBC QN AUTO: 31.2 PG (ref 26–35)
MCHC RBC AUTO-ENTMCNC: 34.7 G/DL (ref 32–34.5)
MCV RBC AUTO: 90.1 FL (ref 80–99.9)
MONOCYTES NFR BLD: 0.34 K/UL (ref 0.1–0.95)
MONOCYTES NFR BLD: 6 % (ref 2–12)
NEUTROPHILS NFR BLD: 73 % (ref 43–80)
NEUTS SEG NFR BLD: 4.22 K/UL (ref 1.8–7.3)
PLATELET # BLD AUTO: 176 K/UL (ref 130–450)
PMV BLD AUTO: 9.8 FL (ref 7–12)
POTASSIUM SERPL-SCNC: 4.2 MMOL/L (ref 3.5–5)
PROT SERPL-MCNC: 7.6 G/DL (ref 6.4–8.3)
RBC # BLD AUTO: 4.13 M/UL (ref 3.8–5.8)
SODIUM SERPL-SCNC: 140 MMOL/L (ref 132–146)
WBC OTHER # BLD: 5.8 K/UL (ref 4.5–11.5)

## 2024-04-22 PROCEDURE — 36415 COLL VENOUS BLD VENIPUNCTURE: CPT

## 2024-04-22 PROCEDURE — 85025 COMPLETE CBC W/AUTO DIFF WBC: CPT

## 2024-04-22 PROCEDURE — 80053 COMPREHEN METABOLIC PANEL: CPT

## 2024-04-22 PROCEDURE — 82378 CARCINOEMBRYONIC ANTIGEN: CPT

## 2024-04-23 ENCOUNTER — HOSPITAL ENCOUNTER (OUTPATIENT)
Dept: INFUSION THERAPY | Age: 74
Discharge: HOME | End: 2024-04-23
Payer: MEDICARE

## 2024-04-23 ENCOUNTER — OFFICE VISIT (OUTPATIENT)
Dept: ONCOLOGY | Age: 74
End: 2024-04-23
Payer: MEDICARE

## 2024-04-23 VITALS
DIASTOLIC BLOOD PRESSURE: 80 MMHG | WEIGHT: 241.4 LBS | BODY MASS INDEX: 31.85 KG/M2 | HEART RATE: 51 BPM | SYSTOLIC BLOOD PRESSURE: 154 MMHG | OXYGEN SATURATION: 98 % | TEMPERATURE: 98.3 F | RESPIRATION RATE: 18 BRPM

## 2024-04-23 DIAGNOSIS — C20 RECTAL CANCER METASTASIZED TO LIVER (HCC): Primary | ICD-10-CM

## 2024-04-23 DIAGNOSIS — C78.7 RECTAL CANCER METASTASIZED TO LIVER (HCC): Primary | ICD-10-CM

## 2024-04-23 PROCEDURE — 99214 OFFICE O/P EST MOD 30 MIN: CPT

## 2024-04-23 PROCEDURE — 6360000002 HC RX W HCPCS: Performed by: INTERNAL MEDICINE

## 2024-04-23 PROCEDURE — 1123F ACP DISCUSS/DSCN MKR DOCD: CPT | Performed by: NURSE PRACTITIONER

## 2024-04-23 PROCEDURE — 2580000003 HC RX 258: Performed by: INTERNAL MEDICINE

## 2024-04-23 PROCEDURE — 96523 IRRIG DRUG DELIVERY DEVICE: CPT

## 2024-04-23 PROCEDURE — 99213 OFFICE O/P EST LOW 20 MIN: CPT | Performed by: NURSE PRACTITIONER

## 2024-04-23 RX ORDER — SODIUM CHLORIDE 0.9 % (FLUSH) 0.9 %
10 SYRINGE (ML) INJECTION PRN
OUTPATIENT
Start: 2024-04-23

## 2024-04-23 RX ORDER — HEPARIN 100 UNIT/ML
500 SYRINGE INTRAVENOUS PRN
Status: DISCONTINUED | OUTPATIENT
Start: 2024-04-23 | End: 2024-04-24 | Stop reason: HOSPADM

## 2024-04-23 RX ORDER — WATER 10 ML/10ML
2.2 INJECTION INTRAMUSCULAR; INTRAVENOUS; SUBCUTANEOUS ONCE
OUTPATIENT
Start: 2024-04-23 | End: 2024-04-23

## 2024-04-23 RX ORDER — SODIUM CHLORIDE 0.9 % (FLUSH) 0.9 %
10 SYRINGE (ML) INJECTION PRN
Status: DISCONTINUED | OUTPATIENT
Start: 2024-04-23 | End: 2024-04-24 | Stop reason: HOSPADM

## 2024-04-23 RX ORDER — HEPARIN 100 UNIT/ML
500 SYRINGE INTRAVENOUS PRN
OUTPATIENT
Start: 2024-04-23

## 2024-04-23 RX ADMIN — SODIUM CHLORIDE, PRESERVATIVE FREE 10 ML: 5 INJECTION INTRAVENOUS at 09:38

## 2024-04-23 RX ADMIN — Medication 500 UNITS: at 09:38

## 2024-05-07 ASSESSMENT — ENCOUNTER SYMPTOMS
ABDOMINAL PAIN: 0
CONSTIPATION: 0
DIARRHEA: 0
ABDOMINAL DISTENTION: 0
NAUSEA: 0
GASTROINTESTINAL NEGATIVE: 1
RECTAL PAIN: 0
BLOOD IN STOOL: 0

## 2024-07-15 ENCOUNTER — HOSPITAL ENCOUNTER (OUTPATIENT)
Age: 74
Discharge: HOME | End: 2024-07-15
Payer: MEDICARE

## 2024-07-15 DIAGNOSIS — C20 RECTAL CANCER METASTASIZED TO LIVER (HCC): ICD-10-CM

## 2024-07-15 DIAGNOSIS — C78.7 RECTAL CANCER METASTASIZED TO LIVER (HCC): ICD-10-CM

## 2024-07-15 LAB
ALBUMIN SERPL-MCNC: 4.5 G/DL (ref 3.5–5.2)
ALP SERPL-CCNC: 108 U/L (ref 40–129)
ALT SERPL-CCNC: 20 U/L (ref 0–40)
ANION GAP SERPL CALCULATED.3IONS-SCNC: 13 MMOL/L (ref 7–16)
AST SERPL-CCNC: 21 U/L (ref 0–39)
BASOPHILS # BLD: 0.04 K/UL (ref 0–0.2)
BASOPHILS NFR BLD: 1 % (ref 0–2)
BILIRUB SERPL-MCNC: 0.5 MG/DL (ref 0–1.2)
BUN SERPL-MCNC: 24 MG/DL (ref 6–23)
CALCIUM SERPL-MCNC: 9.3 MG/DL (ref 8.6–10.2)
CEA SERPL-MCNC: 2.1 NG/ML (ref 0–5.2)
CHLORIDE SERPL-SCNC: 108 MMOL/L (ref 98–107)
CO2 SERPL-SCNC: 23 MMOL/L (ref 22–29)
CREAT SERPL-MCNC: 1.3 MG/DL (ref 0.7–1.2)
EOSINOPHIL # BLD: 0.15 K/UL (ref 0.05–0.5)
EOSINOPHILS RELATIVE PERCENT: 3 % (ref 0–6)
ERYTHROCYTE [DISTWIDTH] IN BLOOD BY AUTOMATED COUNT: 12.8 % (ref 11.5–15)
GFR, ESTIMATED: 57 ML/MIN/1.73M2
GLUCOSE SERPL-MCNC: 99 MG/DL (ref 74–99)
HCT VFR BLD AUTO: 38.2 % (ref 37–54)
HGB BLD-MCNC: 12.7 G/DL (ref 12.5–16.5)
IMM GRANULOCYTES # BLD AUTO: 0.04 K/UL (ref 0–0.58)
IMM GRANULOCYTES NFR BLD: 1 % (ref 0–5)
LYMPHOCYTES NFR BLD: 0.85 K/UL (ref 1.5–4)
LYMPHOCYTES RELATIVE PERCENT: 15 % (ref 20–42)
MCH RBC QN AUTO: 30.4 PG (ref 26–35)
MCHC RBC AUTO-ENTMCNC: 33.2 G/DL (ref 32–34.5)
MCV RBC AUTO: 91.4 FL (ref 80–99.9)
MONOCYTES NFR BLD: 0.36 K/UL (ref 0.1–0.95)
MONOCYTES NFR BLD: 6 % (ref 2–12)
NEUTROPHILS NFR BLD: 75 % (ref 43–80)
NEUTS SEG NFR BLD: 4.29 K/UL (ref 1.8–7.3)
PLATELET # BLD AUTO: 156 K/UL (ref 130–450)
PMV BLD AUTO: 9.6 FL (ref 7–12)
POTASSIUM SERPL-SCNC: 4.5 MMOL/L (ref 3.5–5)
PROT SERPL-MCNC: 7.7 G/DL (ref 6.4–8.3)
RBC # BLD AUTO: 4.18 M/UL (ref 3.8–5.8)
SODIUM SERPL-SCNC: 144 MMOL/L (ref 132–146)
WBC OTHER # BLD: 5.7 K/UL (ref 4.5–11.5)

## 2024-07-15 PROCEDURE — 82378 CARCINOEMBRYONIC ANTIGEN: CPT

## 2024-07-15 PROCEDURE — 80053 COMPREHEN METABOLIC PANEL: CPT

## 2024-07-15 PROCEDURE — 85025 COMPLETE CBC W/AUTO DIFF WBC: CPT

## 2024-07-15 PROCEDURE — 36415 COLL VENOUS BLD VENIPUNCTURE: CPT

## 2024-07-18 ENCOUNTER — HOSPITAL ENCOUNTER (OUTPATIENT)
Dept: CT IMAGING | Age: 74
Discharge: HOME | End: 2024-07-18
Payer: MEDICARE

## 2024-07-18 ENCOUNTER — HOSPITAL ENCOUNTER (OUTPATIENT)
Dept: CT IMAGING | Age: 74
End: 2024-07-18
Payer: MEDICARE

## 2024-07-18 DIAGNOSIS — C78.7 RECTAL CANCER METASTASIZED TO LIVER (HCC): ICD-10-CM

## 2024-07-18 DIAGNOSIS — C20 RECTAL CANCER METASTASIZED TO LIVER (HCC): ICD-10-CM

## 2024-07-18 PROCEDURE — 74177 CT ABD & PELVIS W/CONTRAST: CPT

## 2024-07-18 PROCEDURE — 71260 CT THORAX DX C+: CPT

## 2024-07-18 PROCEDURE — 6360000004 HC RX CONTRAST MEDICATION: Performed by: RADIOLOGY

## 2024-07-18 RX ADMIN — IOPAMIDOL 75 ML: 755 INJECTION, SOLUTION INTRAVENOUS at 08:53

## 2024-07-18 RX ADMIN — IOPAMIDOL 18 ML: 755 INJECTION, SOLUTION INTRAVENOUS at 08:53

## 2024-07-23 ENCOUNTER — HOSPITAL ENCOUNTER (OUTPATIENT)
Age: 74
Discharge: HOME | End: 2024-07-23
Payer: MEDICARE

## 2024-07-23 LAB
ALBUMIN SERPL-MCNC: 4.4 G/DL (ref 3.5–5.2)
ALP SERPL-CCNC: 104 U/L (ref 40–129)
ALT SERPL-CCNC: 20 U/L (ref 0–40)
ANION GAP SERPL CALCULATED.3IONS-SCNC: 12 MMOL/L (ref 7–16)
AST SERPL-CCNC: 20 U/L (ref 0–39)
BILIRUB SERPL-MCNC: 0.7 MG/DL (ref 0–1.2)
BUN SERPL-MCNC: 26 MG/DL (ref 6–23)
CALCIUM SERPL-MCNC: 9.5 MG/DL (ref 8.6–10.2)
CHLORIDE SERPL-SCNC: 105 MMOL/L (ref 98–107)
CHOLEST SERPL-MCNC: 138 MG/DL
CO2 SERPL-SCNC: 23 MMOL/L (ref 22–29)
CREAT SERPL-MCNC: 1.4 MG/DL (ref 0.7–1.2)
ERYTHROCYTE [DISTWIDTH] IN BLOOD BY AUTOMATED COUNT: 12.6 % (ref 11.5–15)
GFR, ESTIMATED: 52 ML/MIN/1.73M2
GLUCOSE SERPL-MCNC: 105 MG/DL (ref 74–99)
HCT VFR BLD AUTO: 39.2 % (ref 37–54)
HDLC SERPL-MCNC: 38 MG/DL
HGB BLD-MCNC: 13.3 G/DL (ref 12.5–16.5)
LDLC SERPL CALC-MCNC: 75 MG/DL
MCH RBC QN AUTO: 31.4 PG (ref 26–35)
MCHC RBC AUTO-ENTMCNC: 33.9 G/DL (ref 32–34.5)
MCV RBC AUTO: 92.5 FL (ref 80–99.9)
PLATELET # BLD AUTO: 163 K/UL (ref 130–450)
PMV BLD AUTO: 9.5 FL (ref 7–12)
POTASSIUM SERPL-SCNC: 4.6 MMOL/L (ref 3.5–5)
PROT SERPL-MCNC: 7.4 G/DL (ref 6.4–8.3)
RBC # BLD AUTO: 4.24 M/UL (ref 3.8–5.8)
SODIUM SERPL-SCNC: 140 MMOL/L (ref 132–146)
TRIGL SERPL-MCNC: 125 MG/DL
VLDLC SERPL CALC-MCNC: 25 MG/DL
WBC OTHER # BLD: 5.4 K/UL (ref 4.5–11.5)

## 2024-07-23 PROCEDURE — 85027 COMPLETE CBC AUTOMATED: CPT

## 2024-07-23 PROCEDURE — 36415 COLL VENOUS BLD VENIPUNCTURE: CPT

## 2024-07-23 PROCEDURE — 80061 LIPID PANEL: CPT

## 2024-07-23 PROCEDURE — 80053 COMPREHEN METABOLIC PANEL: CPT

## 2024-07-24 ENCOUNTER — OFFICE VISIT (OUTPATIENT)
Dept: ONCOLOGY | Age: 74
End: 2024-07-24
Payer: MEDICARE

## 2024-07-24 ENCOUNTER — HOSPITAL ENCOUNTER (OUTPATIENT)
Dept: INFUSION THERAPY | Age: 74
Discharge: HOME | End: 2024-07-24
Payer: MEDICARE

## 2024-07-24 VITALS
HEIGHT: 73 IN | DIASTOLIC BLOOD PRESSURE: 81 MMHG | SYSTOLIC BLOOD PRESSURE: 133 MMHG | HEART RATE: 56 BPM | BODY MASS INDEX: 30.87 KG/M2 | WEIGHT: 232.9 LBS | OXYGEN SATURATION: 99 % | TEMPERATURE: 98.3 F

## 2024-07-24 DIAGNOSIS — C20 RECTAL CANCER METASTASIZED TO LIVER (HCC): Primary | ICD-10-CM

## 2024-07-24 DIAGNOSIS — C78.7 RECTAL CANCER METASTASIZED TO LIVER (HCC): Primary | ICD-10-CM

## 2024-07-24 PROCEDURE — 99213 OFFICE O/P EST LOW 20 MIN: CPT | Performed by: STUDENT IN AN ORGANIZED HEALTH CARE EDUCATION/TRAINING PROGRAM

## 2024-07-24 PROCEDURE — 2580000003 HC RX 258: Performed by: INTERNAL MEDICINE

## 2024-07-24 PROCEDURE — 1123F ACP DISCUSS/DSCN MKR DOCD: CPT | Performed by: STUDENT IN AN ORGANIZED HEALTH CARE EDUCATION/TRAINING PROGRAM

## 2024-07-24 PROCEDURE — 96523 IRRIG DRUG DELIVERY DEVICE: CPT

## 2024-07-24 PROCEDURE — 6360000002 HC RX W HCPCS: Performed by: INTERNAL MEDICINE

## 2024-07-24 RX ORDER — WATER 10 ML/10ML
2.2 INJECTION INTRAMUSCULAR; INTRAVENOUS; SUBCUTANEOUS ONCE
OUTPATIENT
Start: 2024-07-24 | End: 2024-07-24

## 2024-07-24 RX ORDER — SODIUM CHLORIDE 0.9 % (FLUSH) 0.9 %
10 SYRINGE (ML) INJECTION PRN
Status: DISCONTINUED | OUTPATIENT
Start: 2024-07-24 | End: 2024-07-25 | Stop reason: HOSPADM

## 2024-07-24 RX ORDER — SODIUM CHLORIDE 0.9 % (FLUSH) 0.9 %
10 SYRINGE (ML) INJECTION PRN
OUTPATIENT
Start: 2024-07-24

## 2024-07-24 RX ORDER — HEPARIN 100 UNIT/ML
500 SYRINGE INTRAVENOUS PRN
Status: DISCONTINUED | OUTPATIENT
Start: 2024-07-24 | End: 2024-07-25 | Stop reason: HOSPADM

## 2024-07-24 RX ORDER — HEPARIN 100 UNIT/ML
500 SYRINGE INTRAVENOUS PRN
OUTPATIENT
Start: 2024-07-24

## 2024-07-24 RX ADMIN — SODIUM CHLORIDE, PRESERVATIVE FREE 10 ML: 5 INJECTION INTRAVENOUS at 10:10

## 2024-07-24 RX ADMIN — Medication 500 UNITS: at 10:10

## 2024-07-24 NOTE — PROGRESS NOTES
PORT FLUSH    Patient presents to clinic for Port Flush today.  Left chest  SQ port accessed per policy using 20G 0.75in  Campos needle for good blood return.    Site flushed easily with 10 mL NSS followed by 5 mL Heparin solution 100 units/ml rinse prior to de-access.  Dry sterile dressing to area.  Tolerated procedure well.  Encouraged to schedule port flush every 4 weeks.

## 2024-07-24 NOTE — PROGRESS NOTES
LIVER BIOPSY  11/12/15    SKIN FULL GRAFT      on finger    TONSILLECTOMY Bilateral     TUNNELED CENTRAL VENOUS CATHETER W/ SUBCUTANEOUS PORT Left      Social History     Socioeconomic History    Marital status:      Spouse name: Not on file    Number of children: 1    Years of education: Not on file    Highest education level: Not on file   Occupational History    Occupation: Worked for [employer]     Employer: RETIRED   Tobacco Use    Smoking status: Former     Current packs/day: 0.00     Average packs/day: 1 pack/day for 40.0 years (40.0 ttl pk-yrs)     Types: Cigarettes     Start date: 1961     Quit date: 2001     Years since quittin.4    Smokeless tobacco: Never   Vaping Use    Vaping Use: Never used   Substance and Sexual Activity    Alcohol use: Not Currently     Comment: Quit 6 years ago    Drug use: No    Sexual activity: Not Currently   Other Topics Concern    Not on file   Social History Narrative    Lives with lui. Has a daughter. She resides in [place].      Social Determinants of Health     Financial Resource Strain: Not on file   Food Insecurity: Not on file   Transportation Needs: Not on file   Physical Activity: Not on file   Stress: Not on file   Social Connections: Not on file   Intimate Partner Violence: Not on file   Housing Stability: Not on file     Family History   Problem Relation Age of Onset    Cancer Mother         breast cancer    Cancer Father         malignant brain tumor    Diabetes Brother           Medications:  Current Outpatient Medications on File Prior to Visit   Medication Sig Dispense Refill    Misc Natural Products (OSTEO BI-FLEX JOINT SHIELD PO) Take by mouth      amLODIPine (NORVASC) 5 MG tablet TAKE 1 TABLET BY MOUTH EVERY DAY      hydrocortisone 2.5 % cream       ferrous gluconate (FERGON) 324 (38 Fe) MG tablet Take 1 tablet by mouth daily (with breakfast)      tamsulosin (FLOMAX) 0.4 MG capsule Take 2 capsules by mouth daily

## 2024-08-26 ENCOUNTER — HOSPITAL ENCOUNTER (OUTPATIENT)
Age: 74
Discharge: HOME | End: 2024-08-26
Payer: MEDICARE

## 2024-08-26 LAB — PSA SERPL-MCNC: 1.08 NG/ML (ref 0–4)

## 2024-08-26 PROCEDURE — 36415 COLL VENOUS BLD VENIPUNCTURE: CPT

## 2024-08-26 PROCEDURE — 84153 ASSAY OF PSA TOTAL: CPT

## 2024-09-05 ENCOUNTER — HOSPITAL ENCOUNTER (OUTPATIENT)
Dept: INFUSION THERAPY | Age: 74
Discharge: HOME | End: 2024-09-05
Payer: MEDICARE

## 2024-09-05 DIAGNOSIS — C20 RECTAL CANCER METASTASIZED TO LIVER (HCC): Primary | ICD-10-CM

## 2024-09-05 DIAGNOSIS — C78.7 RECTAL CANCER METASTASIZED TO LIVER (HCC): Primary | ICD-10-CM

## 2024-09-05 PROCEDURE — 6360000002 HC RX W HCPCS: Performed by: STUDENT IN AN ORGANIZED HEALTH CARE EDUCATION/TRAINING PROGRAM

## 2024-09-05 PROCEDURE — 96523 IRRIG DRUG DELIVERY DEVICE: CPT

## 2024-09-05 PROCEDURE — 2580000003 HC RX 258: Performed by: STUDENT IN AN ORGANIZED HEALTH CARE EDUCATION/TRAINING PROGRAM

## 2024-09-05 RX ORDER — SODIUM CHLORIDE 0.9 % (FLUSH) 0.9 %
10 SYRINGE (ML) INJECTION PRN
Status: DISCONTINUED | OUTPATIENT
Start: 2024-09-05 | End: 2024-09-06 | Stop reason: HOSPADM

## 2024-09-05 RX ORDER — WATER 10 ML/10ML
2.2 INJECTION INTRAMUSCULAR; INTRAVENOUS; SUBCUTANEOUS ONCE
OUTPATIENT
Start: 2024-09-05 | End: 2024-09-05

## 2024-09-05 RX ORDER — SODIUM CHLORIDE 0.9 % (FLUSH) 0.9 %
10 SYRINGE (ML) INJECTION PRN
OUTPATIENT
Start: 2024-09-05

## 2024-09-05 RX ORDER — HEPARIN 100 UNIT/ML
500 SYRINGE INTRAVENOUS PRN
Status: DISCONTINUED | OUTPATIENT
Start: 2024-09-05 | End: 2024-09-06 | Stop reason: HOSPADM

## 2024-09-05 RX ORDER — WATER 10 ML/10ML
2.2 INJECTION INTRAMUSCULAR; INTRAVENOUS; SUBCUTANEOUS ONCE
Status: CANCELLED | OUTPATIENT
Start: 2024-09-05 | End: 2024-09-05

## 2024-09-05 RX ORDER — HEPARIN 100 UNIT/ML
500 SYRINGE INTRAVENOUS PRN
OUTPATIENT
Start: 2024-09-05

## 2024-09-05 RX ADMIN — Medication 500 UNITS: at 07:07

## 2024-09-05 RX ADMIN — SODIUM CHLORIDE, PRESERVATIVE FREE 10 ML: 5 INJECTION INTRAVENOUS at 07:07

## 2024-09-05 NOTE — PROGRESS NOTES
PORT FLUSH    Patient presents to clinic for Port Flush today.  single  SQ port accessed per policy using 20g, .75 inch Campos needle for good blood return.  Site flushed easily with 10 mL NSS followed by 5 mL Heparin solution 100 units/ml rinse prior to de-access.  Dry sterile dressing to area.  Tolerated procedure well.  Encouraged to schedule port flush every 4 weeks.

## 2024-09-19 ENCOUNTER — OFFICE VISIT (OUTPATIENT)
Dept: ENT CLINIC | Age: 74
End: 2024-09-19
Payer: MEDICARE

## 2024-09-19 ENCOUNTER — PROCEDURE VISIT (OUTPATIENT)
Dept: AUDIOLOGY | Age: 74
End: 2024-09-19
Payer: MEDICARE

## 2024-09-19 VITALS
SYSTOLIC BLOOD PRESSURE: 134 MMHG | WEIGHT: 238 LBS | DIASTOLIC BLOOD PRESSURE: 78 MMHG | HEART RATE: 65 BPM | HEIGHT: 73 IN | BODY MASS INDEX: 31.54 KG/M2

## 2024-09-19 DIAGNOSIS — H90.3 SENSORINEURAL HEARING LOSS (SNHL) OF BOTH EARS: Primary | ICD-10-CM

## 2024-09-19 DIAGNOSIS — H93.13 TINNITUS OF BOTH EARS: ICD-10-CM

## 2024-09-19 DIAGNOSIS — H90.3 SENSORINEURAL HEARING LOSS, BILATERAL: Primary | ICD-10-CM

## 2024-09-19 PROCEDURE — 92567 TYMPANOMETRY: CPT

## 2024-09-19 PROCEDURE — 1123F ACP DISCUSS/DSCN MKR DOCD: CPT | Performed by: NURSE PRACTITIONER

## 2024-09-19 PROCEDURE — 92557 COMPREHENSIVE HEARING TEST: CPT

## 2024-09-19 PROCEDURE — 99213 OFFICE O/P EST LOW 20 MIN: CPT | Performed by: NURSE PRACTITIONER

## 2024-09-19 ASSESSMENT — ENCOUNTER SYMPTOMS
EYES NEGATIVE: 1
SINUS PAIN: 0
SHORTNESS OF BREATH: 0
RHINORRHEA: 0
STRIDOR: 0
SINUS PRESSURE: 0
RESPIRATORY NEGATIVE: 1

## 2024-10-28 ENCOUNTER — HOSPITAL ENCOUNTER (OUTPATIENT)
Age: 74
Discharge: HOME | End: 2024-10-28
Payer: MEDICARE

## 2024-10-28 LAB
ALBUMIN SERPL-MCNC: 4.5 G/DL (ref 3.5–5.2)
ALP SERPL-CCNC: 133 U/L (ref 40–129)
ALT SERPL-CCNC: 21 U/L (ref 0–40)
ANION GAP SERPL CALCULATED.3IONS-SCNC: 12 MMOL/L (ref 7–16)
AST SERPL-CCNC: 24 U/L (ref 0–39)
BASOPHILS # BLD: 0.03 K/UL (ref 0–0.2)
BASOPHILS NFR BLD: 1 % (ref 0–2)
BILIRUB SERPL-MCNC: 0.5 MG/DL (ref 0–1.2)
BUN SERPL-MCNC: 23 MG/DL (ref 6–23)
CALCIUM SERPL-MCNC: 9.3 MG/DL (ref 8.6–10.2)
CEA SERPL-MCNC: 2 NG/ML (ref 0–5.2)
CHLORIDE SERPL-SCNC: 106 MMOL/L (ref 98–107)
CO2 SERPL-SCNC: 24 MMOL/L (ref 22–29)
CREAT SERPL-MCNC: 1.4 MG/DL (ref 0.7–1.2)
EOSINOPHIL # BLD: 0.14 K/UL (ref 0.05–0.5)
EOSINOPHILS RELATIVE PERCENT: 3 % (ref 0–6)
ERYTHROCYTE [DISTWIDTH] IN BLOOD BY AUTOMATED COUNT: 12.4 % (ref 11.5–15)
GFR, ESTIMATED: 54 ML/MIN/1.73M2
GLUCOSE SERPL-MCNC: 112 MG/DL (ref 74–99)
HCT VFR BLD AUTO: 37.4 % (ref 37–54)
HGB BLD-MCNC: 12.9 G/DL (ref 12.5–16.5)
IMM GRANULOCYTES # BLD AUTO: 0.04 K/UL (ref 0–0.58)
IMM GRANULOCYTES NFR BLD: 1 % (ref 0–5)
LYMPHOCYTES NFR BLD: 0.89 K/UL (ref 1.5–4)
LYMPHOCYTES RELATIVE PERCENT: 17 % (ref 20–42)
MCH RBC QN AUTO: 31.9 PG (ref 26–35)
MCHC RBC AUTO-ENTMCNC: 34.5 G/DL (ref 32–34.5)
MCV RBC AUTO: 92.3 FL (ref 80–99.9)
MONOCYTES NFR BLD: 0.34 K/UL (ref 0.1–0.95)
MONOCYTES NFR BLD: 6 % (ref 2–12)
NEUTROPHILS NFR BLD: 73 % (ref 43–80)
NEUTS SEG NFR BLD: 3.95 K/UL (ref 1.8–7.3)
PLATELET # BLD AUTO: 168 K/UL (ref 130–450)
PMV BLD AUTO: 9.4 FL (ref 7–12)
POTASSIUM SERPL-SCNC: 4.2 MMOL/L (ref 3.5–5)
PROT SERPL-MCNC: 7.6 G/DL (ref 6.4–8.3)
RBC # BLD AUTO: 4.05 M/UL (ref 3.8–5.8)
SODIUM SERPL-SCNC: 142 MMOL/L (ref 132–146)
WBC OTHER # BLD: 5.4 K/UL (ref 4.5–11.5)

## 2024-10-28 PROCEDURE — 85025 COMPLETE CBC W/AUTO DIFF WBC: CPT

## 2024-10-28 PROCEDURE — 82378 CARCINOEMBRYONIC ANTIGEN: CPT

## 2024-10-28 PROCEDURE — 36415 COLL VENOUS BLD VENIPUNCTURE: CPT

## 2024-10-28 PROCEDURE — 80053 COMPREHEN METABOLIC PANEL: CPT

## 2024-10-30 ENCOUNTER — HOSPITAL ENCOUNTER (OUTPATIENT)
Dept: INFUSION THERAPY | Age: 74
Discharge: HOME | End: 2024-10-30
Payer: MEDICARE

## 2024-10-30 ENCOUNTER — OFFICE VISIT (OUTPATIENT)
Dept: ONCOLOGY | Age: 74
End: 2024-10-30
Payer: MEDICARE

## 2024-10-30 VITALS
DIASTOLIC BLOOD PRESSURE: 76 MMHG | WEIGHT: 236.7 LBS | TEMPERATURE: 97.3 F | HEART RATE: 57 BPM | SYSTOLIC BLOOD PRESSURE: 140 MMHG | OXYGEN SATURATION: 98 % | BODY MASS INDEX: 31.37 KG/M2 | HEIGHT: 73 IN

## 2024-10-30 DIAGNOSIS — C78.7 RECTAL CANCER METASTASIZED TO LIVER (HCC): Primary | ICD-10-CM

## 2024-10-30 DIAGNOSIS — C20 RECTAL CANCER METASTASIZED TO LIVER (HCC): Primary | ICD-10-CM

## 2024-10-30 PROCEDURE — 2580000003 HC RX 258: Performed by: STUDENT IN AN ORGANIZED HEALTH CARE EDUCATION/TRAINING PROGRAM

## 2024-10-30 PROCEDURE — 6360000002 HC RX W HCPCS: Performed by: STUDENT IN AN ORGANIZED HEALTH CARE EDUCATION/TRAINING PROGRAM

## 2024-10-30 PROCEDURE — 99214 OFFICE O/P EST MOD 30 MIN: CPT

## 2024-10-30 PROCEDURE — 96523 IRRIG DRUG DELIVERY DEVICE: CPT

## 2024-10-30 RX ORDER — SODIUM CHLORIDE 0.9 % (FLUSH) 0.9 %
10 SYRINGE (ML) INJECTION PRN
Status: DISCONTINUED | OUTPATIENT
Start: 2024-10-30 | End: 2024-10-31 | Stop reason: HOSPADM

## 2024-10-30 RX ORDER — SODIUM CHLORIDE 0.9 % (FLUSH) 0.9 %
10 SYRINGE (ML) INJECTION PRN
OUTPATIENT
Start: 2024-10-30

## 2024-10-30 RX ORDER — WATER 10 ML/10ML
2.2 INJECTION INTRAMUSCULAR; INTRAVENOUS; SUBCUTANEOUS ONCE
OUTPATIENT
Start: 2024-10-30 | End: 2024-10-30

## 2024-10-30 RX ORDER — HEPARIN 100 UNIT/ML
500 SYRINGE INTRAVENOUS PRN
Status: DISCONTINUED | OUTPATIENT
Start: 2024-10-30 | End: 2024-10-31 | Stop reason: HOSPADM

## 2024-10-30 RX ORDER — HEPARIN 100 UNIT/ML
500 SYRINGE INTRAVENOUS PRN
OUTPATIENT
Start: 2024-10-30

## 2024-10-30 RX ADMIN — SODIUM CHLORIDE, PRESERVATIVE FREE 10 ML: 5 INJECTION INTRAVENOUS at 09:49

## 2024-10-30 RX ADMIN — Medication 500 UNITS: at 09:49

## 2024-10-30 NOTE — PROGRESS NOTES
PORT FLUSH    Patient presents to clinic for Port Flush today.  L chest  SQ port accessed per policy using 20G 0.75 inches Campos needle for good blood return.  Site flushed easily with 10 mL NSS followed by 5 mL Heparin solution 100 units/ml rinse prior to de-access.  Dry sterile dressing to area.  Tolerated procedure well.  Encouraged to schedule port flush every 4 weeks.

## 2024-10-30 NOTE — PROGRESS NOTES
[facility] MEDICAL ONCOLOGY  [address] [address]  Dept: [phone]  Loc: [phone]              Attending Clinic Note     Reason for Visit: Follow-up on a patient with Metastatic Rectal Cancer, in remission, off treatment     PCP: Josefina Maya MD       Subjective:  Pt overall doing well. Without major issues.   Denies of new pain or bleeding.         Oncologic History:  74 y.o.   male who was referred to see Dr. Tucker Boogie (GI team) for evaluation of bright red blood per rectum, mild anemia and change in bowel habits with diarrhea.   CEA 2640 on 10/19/2015. AlcP 299 AST 57 ALT 75 on 10/19/2015.  Colonoscopy in 2013 noted no significant polyps, colitis or lesions at that time. Denies any Family History of colorectal cancer or polyps.     Colonoscopy on 10/19/2015 revealed:  1. Ascending polyp, 8 mm, hot snare: Tubulovillous adenoma.  2. Transverse polyp, 1 cm, hot snare: Serrated polyp most consistent with sessile serrated adenoma.  3. Five splenic flexure polyps, three - 5 mm, 7 mm, 8 mm, hot snare and biopsy: Four segments of Tubular Adenoma  4. Descending polyp, 5 mm, biopsy: Tubular adenoma.  5. Sigmoid polyp, 4 mm biopsy, Serrated polyp most consistent with sessile serrated adenoma.   6. Two rectal polyps, 4 mm biopsy: Serrated polyp most consistent with hyperplastic polyp.   7. Rectosigmoid colon mass (large mass approximately 65% circumference of the lumen; Very friable, firm and hard):  Tubulovillous adenoma with associated focal erosion and fibroplasia.      CT scan abdomen/pelvis on 10/26/2015:  1. Small nodules at lung bases likely represent metastatic colon   cancer.   2. Extensive likely metastatic colon cancer throughout the liver.      3. Mild mural thickening and luminal narrowing in the   terminal ileum, otherwise nonspecific.      Colonoscopy with snare removal rectal mass was performed by Dr. Her. Pathology

## 2024-12-17 ENCOUNTER — HOSPITAL ENCOUNTER (OUTPATIENT)
Dept: INFUSION THERAPY | Age: 74
Discharge: HOME | End: 2024-12-17
Payer: MEDICARE

## 2024-12-17 DIAGNOSIS — C78.7 RECTAL CANCER METASTASIZED TO LIVER (HCC): Primary | ICD-10-CM

## 2024-12-17 DIAGNOSIS — C20 RECTAL CANCER METASTASIZED TO LIVER (HCC): Primary | ICD-10-CM

## 2024-12-17 PROCEDURE — 6360000002 HC RX W HCPCS: Performed by: STUDENT IN AN ORGANIZED HEALTH CARE EDUCATION/TRAINING PROGRAM

## 2024-12-17 PROCEDURE — 96523 IRRIG DRUG DELIVERY DEVICE: CPT

## 2024-12-17 PROCEDURE — 2580000003 HC RX 258: Performed by: STUDENT IN AN ORGANIZED HEALTH CARE EDUCATION/TRAINING PROGRAM

## 2024-12-17 RX ORDER — HEPARIN 100 UNIT/ML
500 SYRINGE INTRAVENOUS PRN
OUTPATIENT
Start: 2024-12-17

## 2024-12-17 RX ORDER — WATER 10 ML/10ML
2.2 INJECTION INTRAMUSCULAR; INTRAVENOUS; SUBCUTANEOUS ONCE
OUTPATIENT
Start: 2024-12-17 | End: 2024-12-17

## 2024-12-17 RX ORDER — SODIUM CHLORIDE 0.9 % (FLUSH) 0.9 %
10 SYRINGE (ML) INJECTION PRN
OUTPATIENT
Start: 2024-12-17

## 2024-12-17 RX ORDER — SODIUM CHLORIDE 0.9 % (FLUSH) 0.9 %
10 SYRINGE (ML) INJECTION PRN
Status: DISCONTINUED | OUTPATIENT
Start: 2024-12-17 | End: 2024-12-18 | Stop reason: HOSPADM

## 2024-12-17 RX ORDER — HEPARIN 100 UNIT/ML
500 SYRINGE INTRAVENOUS PRN
Status: DISCONTINUED | OUTPATIENT
Start: 2024-12-17 | End: 2024-12-18 | Stop reason: HOSPADM

## 2024-12-17 RX ADMIN — HEPARIN 500 UNITS: 100 SYRINGE at 13:09

## 2024-12-17 RX ADMIN — SODIUM CHLORIDE, PRESERVATIVE FREE 10 ML: 5 INJECTION INTRAVENOUS at 13:09

## 2025-01-14 ENCOUNTER — HOSPITAL ENCOUNTER (OUTPATIENT)
Age: 75
Discharge: HOME | End: 2025-01-14
Payer: MEDICARE

## 2025-01-14 LAB
ALBUMIN SERPL-MCNC: 4.4 G/DL (ref 3.5–5.2)
ALP SERPL-CCNC: 119 U/L (ref 40–129)
ALT SERPL-CCNC: 16 U/L (ref 0–40)
ANION GAP SERPL CALCULATED.3IONS-SCNC: 12 MMOL/L (ref 7–16)
AST SERPL-CCNC: 20 U/L (ref 0–39)
BASOPHILS # BLD: 0.05 K/UL (ref 0–0.2)
BASOPHILS NFR BLD: 1 % (ref 0–2)
BILIRUB SERPL-MCNC: 0.4 MG/DL (ref 0–1.2)
BUN SERPL-MCNC: 27 MG/DL (ref 6–23)
CALCIUM SERPL-MCNC: 9.6 MG/DL (ref 8.6–10.2)
CEA SERPL-MCNC: 1.6 NG/ML (ref 0–5.2)
CHLORIDE SERPL-SCNC: 103 MMOL/L (ref 98–107)
CO2 SERPL-SCNC: 22 MMOL/L (ref 22–29)
CREAT SERPL-MCNC: 1.5 MG/DL (ref 0.7–1.2)
EOSINOPHIL # BLD: 0.15 K/UL (ref 0.05–0.5)
EOSINOPHILS RELATIVE PERCENT: 3 % (ref 0–6)
ERYTHROCYTE [DISTWIDTH] IN BLOOD BY AUTOMATED COUNT: 12.4 % (ref 11.5–15)
GFR, ESTIMATED: 50 ML/MIN/1.73M2
GLUCOSE SERPL-MCNC: 126 MG/DL (ref 74–99)
HCT VFR BLD AUTO: 39.4 % (ref 37–54)
HGB BLD-MCNC: 13.4 G/DL (ref 12.5–16.5)
IMM GRANULOCYTES # BLD AUTO: 0.04 K/UL (ref 0–0.58)
IMM GRANULOCYTES NFR BLD: 1 % (ref 0–5)
LYMPHOCYTES NFR BLD: 0.87 K/UL (ref 1.5–4)
LYMPHOCYTES RELATIVE PERCENT: 17 % (ref 20–42)
MCH RBC QN AUTO: 31.8 PG (ref 26–35)
MCHC RBC AUTO-ENTMCNC: 34 G/DL (ref 32–34.5)
MCV RBC AUTO: 93.4 FL (ref 80–99.9)
MONOCYTES NFR BLD: 0.31 K/UL (ref 0.1–0.95)
MONOCYTES NFR BLD: 6 % (ref 2–12)
NEUTROPHILS NFR BLD: 72 % (ref 43–80)
NEUTS SEG NFR BLD: 3.65 K/UL (ref 1.8–7.3)
PLATELET # BLD AUTO: 181 K/UL (ref 130–450)
PMV BLD AUTO: 9.8 FL (ref 7–12)
POTASSIUM SERPL-SCNC: 4.1 MMOL/L (ref 3.5–5)
PROT SERPL-MCNC: 7.7 G/DL (ref 6.4–8.3)
RBC # BLD AUTO: 4.22 M/UL (ref 3.8–5.8)
SODIUM SERPL-SCNC: 137 MMOL/L (ref 132–146)
WBC OTHER # BLD: 5.1 K/UL (ref 4.5–11.5)

## 2025-01-14 PROCEDURE — 80053 COMPREHEN METABOLIC PANEL: CPT

## 2025-01-14 PROCEDURE — 36415 COLL VENOUS BLD VENIPUNCTURE: CPT

## 2025-01-14 PROCEDURE — 82378 CARCINOEMBRYONIC ANTIGEN: CPT

## 2025-01-14 PROCEDURE — 85025 COMPLETE CBC W/AUTO DIFF WBC: CPT

## 2025-02-12 ENCOUNTER — HOSPITAL ENCOUNTER (OUTPATIENT)
Dept: INFUSION THERAPY | Age: 75
Discharge: HOME | End: 2025-02-12
Payer: MEDICARE

## 2025-02-12 ENCOUNTER — OFFICE VISIT (OUTPATIENT)
Dept: ONCOLOGY | Age: 75
End: 2025-02-12
Payer: MEDICARE

## 2025-02-12 VITALS
TEMPERATURE: 97.7 F | HEIGHT: 73 IN | BODY MASS INDEX: 31.57 KG/M2 | WEIGHT: 238.2 LBS | OXYGEN SATURATION: 98 % | HEART RATE: 55 BPM | DIASTOLIC BLOOD PRESSURE: 84 MMHG | SYSTOLIC BLOOD PRESSURE: 135 MMHG

## 2025-02-12 DIAGNOSIS — C20 RECTAL CANCER METASTASIZED TO LIVER (HCC): Primary | ICD-10-CM

## 2025-02-12 DIAGNOSIS — C78.7 RECTAL CANCER METASTASIZED TO LIVER (HCC): Primary | ICD-10-CM

## 2025-02-12 PROCEDURE — 96523 IRRIG DRUG DELIVERY DEVICE: CPT

## 2025-02-12 PROCEDURE — 2500000003 HC RX 250 WO HCPCS: Performed by: STUDENT IN AN ORGANIZED HEALTH CARE EDUCATION/TRAINING PROGRAM

## 2025-02-12 PROCEDURE — 99213 OFFICE O/P EST LOW 20 MIN: CPT | Performed by: STUDENT IN AN ORGANIZED HEALTH CARE EDUCATION/TRAINING PROGRAM

## 2025-02-12 PROCEDURE — 6360000002 HC RX W HCPCS: Performed by: STUDENT IN AN ORGANIZED HEALTH CARE EDUCATION/TRAINING PROGRAM

## 2025-02-12 PROCEDURE — 1159F MED LIST DOCD IN RCRD: CPT | Performed by: STUDENT IN AN ORGANIZED HEALTH CARE EDUCATION/TRAINING PROGRAM

## 2025-02-12 PROCEDURE — 1123F ACP DISCUSS/DSCN MKR DOCD: CPT | Performed by: STUDENT IN AN ORGANIZED HEALTH CARE EDUCATION/TRAINING PROGRAM

## 2025-02-12 PROCEDURE — 99214 OFFICE O/P EST MOD 30 MIN: CPT

## 2025-02-12 RX ORDER — HEPARIN 100 UNIT/ML
500 SYRINGE INTRAVENOUS PRN
Status: DISCONTINUED | OUTPATIENT
Start: 2025-02-12 | End: 2025-02-13 | Stop reason: HOSPADM

## 2025-02-12 RX ORDER — SODIUM CHLORIDE 0.9 % (FLUSH) 0.9 %
10 SYRINGE (ML) INJECTION PRN
OUTPATIENT
Start: 2025-02-12

## 2025-02-12 RX ORDER — WATER 10 ML/10ML
2.2 INJECTION INTRAMUSCULAR; INTRAVENOUS; SUBCUTANEOUS ONCE
OUTPATIENT
Start: 2025-02-12 | End: 2025-02-12

## 2025-02-12 RX ORDER — SODIUM CHLORIDE 0.9 % (FLUSH) 0.9 %
10 SYRINGE (ML) INJECTION PRN
Status: DISCONTINUED | OUTPATIENT
Start: 2025-02-12 | End: 2025-02-13 | Stop reason: HOSPADM

## 2025-02-12 RX ORDER — HEPARIN 100 UNIT/ML
500 SYRINGE INTRAVENOUS PRN
OUTPATIENT
Start: 2025-02-12

## 2025-02-12 RX ADMIN — SODIUM CHLORIDE, PRESERVATIVE FREE 10 ML: 5 INJECTION INTRAVENOUS at 13:54

## 2025-02-12 RX ADMIN — HEPARIN 500 UNITS: 100 SYRINGE at 13:53

## 2025-02-12 NOTE — PROGRESS NOTES
[facility] MEDICAL ONCOLOGY  [address] [address]  Dept: [phone]  Loc: [phone]              Attending Clinic Note     Reason for Visit: Follow-up on a patient with Metastatic Rectal Cancer, in remission, off treatment     PCP: Josefina Maya MD       Subjective:  Doing well.  No abdominal pain, fevers, chills, unintended weight loss, GI bleeding.  He remains to be in good spirits.        Oncologic History:  74 y.o.   male who was referred to see Dr. Tucker Boogie (GI team) for evaluation of bright red blood per rectum, mild anemia and change in bowel habits with diarrhea.   CEA 2640 on 10/19/2015. AlcP 299 AST 57 ALT 75 on 10/19/2015.  Colonoscopy in 2013 noted no significant polyps, colitis or lesions at that time. Denies any Family History of colorectal cancer or polyps.     Colonoscopy on 10/19/2015 revealed:  1. Ascending polyp, 8 mm, hot snare: Tubulovillous adenoma.  2. Transverse polyp, 1 cm, hot snare: Serrated polyp most consistent with sessile serrated adenoma.  3. Five splenic flexure polyps, three - 5 mm, 7 mm, 8 mm, hot snare and biopsy: Four segments of Tubular Adenoma  4. Descending polyp, 5 mm, biopsy: Tubular adenoma.  5. Sigmoid polyp, 4 mm biopsy, Serrated polyp most consistent with sessile serrated adenoma.   6. Two rectal polyps, 4 mm biopsy: Serrated polyp most consistent with hyperplastic polyp.   7. Rectosigmoid colon mass (large mass approximately 65% circumference of the lumen; Very friable, firm and hard):  Tubulovillous adenoma with associated focal erosion and fibroplasia.      CT scan abdomen/pelvis on 10/26/2015:  1. Small nodules at lung bases likely represent metastatic colon   cancer.   2. Extensive likely metastatic colon cancer throughout the liver.      3. Mild mural thickening and luminal narrowing in the   terminal ileum, otherwise nonspecific.      Colonoscopy with snare removal rectal mass

## 2025-02-12 NOTE — PROGRESS NOTES
PORT FLUSH    Patient presents to clinic for Port Flush today.  Left chest  SQ port accessed per policy using 20 G, 0.75 inch Campos needle for good blood return.    Site flushed easily with 10 mL NSS followed by 5 mL Heparin solution 100 units/ml rinse prior to de-access.  Dry sterile dressing to area.  Tolerated procedure well.  Encouraged to schedule port flush every 4 weeks.

## 2025-03-07 ENCOUNTER — HOSPITAL ENCOUNTER (OUTPATIENT)
Age: 75
Discharge: HOME | End: 2025-03-07
Payer: MEDICARE

## 2025-03-07 LAB
ANION GAP SERPL CALCULATED.3IONS-SCNC: 10 MMOL/L (ref 7–16)
BASOPHILS # BLD: 0.04 K/UL (ref 0–0.2)
BASOPHILS NFR BLD: 1 % (ref 0–2)
BUN SERPL-MCNC: 33 MG/DL (ref 6–23)
CALCIUM SERPL-MCNC: 9.7 MG/DL (ref 8.6–10.2)
CHLORIDE SERPL-SCNC: 105 MMOL/L (ref 98–107)
CO2 SERPL-SCNC: 25 MMOL/L (ref 22–29)
CREAT SERPL-MCNC: 1.4 MG/DL (ref 0.7–1.2)
CRP SERPL HS-MCNC: 15 MG/L (ref 0–5)
EOSINOPHIL # BLD: 0.13 K/UL (ref 0.05–0.5)
EOSINOPHILS RELATIVE PERCENT: 2 % (ref 0–6)
ERYTHROCYTE [DISTWIDTH] IN BLOOD BY AUTOMATED COUNT: 13.4 % (ref 11.5–15)
ERYTHROCYTE [SEDIMENTATION RATE] IN BLOOD BY WESTERGREN METHOD: 12 MM/HR (ref 0–15)
GFR, ESTIMATED: 52 ML/MIN/1.73M2
GLUCOSE SERPL-MCNC: 100 MG/DL (ref 74–99)
HCT VFR BLD AUTO: 39.8 % (ref 37–54)
HGB BLD-MCNC: 13.5 G/DL (ref 12.5–16.5)
IMM GRANULOCYTES # BLD AUTO: 0.19 K/UL (ref 0–0.58)
IMM GRANULOCYTES NFR BLD: 2 % (ref 0–5)
LYMPHOCYTES NFR BLD: 1.03 K/UL (ref 1.5–4)
LYMPHOCYTES RELATIVE PERCENT: 12 % (ref 20–42)
MCH RBC QN AUTO: 31.5 PG (ref 26–35)
MCHC RBC AUTO-ENTMCNC: 33.9 G/DL (ref 32–34.5)
MCV RBC AUTO: 93 FL (ref 80–99.9)
MONOCYTES NFR BLD: 0.57 K/UL (ref 0.1–0.95)
MONOCYTES NFR BLD: 7 % (ref 2–12)
NEUTROPHILS NFR BLD: 77 % (ref 43–80)
NEUTS SEG NFR BLD: 6.61 K/UL (ref 1.8–7.3)
PLATELET # BLD AUTO: 178 K/UL (ref 130–450)
PMV BLD AUTO: 9.9 FL (ref 7–12)
POTASSIUM SERPL-SCNC: 4.7 MMOL/L (ref 3.5–5)
RBC # BLD AUTO: 4.28 M/UL (ref 3.8–5.8)
RHEUMATOID FACT SER NEPH-ACNC: <10 IU/ML (ref 0–13)
SODIUM SERPL-SCNC: 140 MMOL/L (ref 132–146)
URATE SERPL-MCNC: 9.8 MG/DL (ref 3.4–7)
WBC OTHER # BLD: 8.6 K/UL (ref 4.5–11.5)

## 2025-03-07 PROCEDURE — 80048 BASIC METABOLIC PNL TOTAL CA: CPT

## 2025-03-07 PROCEDURE — 86140 C-REACTIVE PROTEIN: CPT

## 2025-03-07 PROCEDURE — 85025 COMPLETE CBC W/AUTO DIFF WBC: CPT

## 2025-03-07 PROCEDURE — 86038 ANTINUCLEAR ANTIBODIES: CPT

## 2025-03-07 PROCEDURE — 36415 COLL VENOUS BLD VENIPUNCTURE: CPT

## 2025-03-07 PROCEDURE — 86431 RHEUMATOID FACTOR QUANT: CPT

## 2025-03-07 PROCEDURE — 84550 ASSAY OF BLOOD/URIC ACID: CPT

## 2025-03-07 PROCEDURE — 85652 RBC SED RATE AUTOMATED: CPT

## 2025-03-10 LAB — ANA SER QL IA: NEGATIVE

## 2025-03-26 ENCOUNTER — HOSPITAL ENCOUNTER (OUTPATIENT)
Dept: INFUSION THERAPY | Age: 75
Discharge: HOME | End: 2025-03-26
Payer: MEDICARE

## 2025-03-26 DIAGNOSIS — C20 RECTAL CANCER METASTASIZED TO LIVER (HCC): Primary | ICD-10-CM

## 2025-03-26 DIAGNOSIS — C78.7 RECTAL CANCER METASTASIZED TO LIVER (HCC): Primary | ICD-10-CM

## 2025-03-26 PROCEDURE — 2500000003 HC RX 250 WO HCPCS: Performed by: STUDENT IN AN ORGANIZED HEALTH CARE EDUCATION/TRAINING PROGRAM

## 2025-03-26 PROCEDURE — 6360000002 HC RX W HCPCS: Performed by: STUDENT IN AN ORGANIZED HEALTH CARE EDUCATION/TRAINING PROGRAM

## 2025-03-26 PROCEDURE — 96523 IRRIG DRUG DELIVERY DEVICE: CPT

## 2025-03-26 RX ORDER — WATER 10 ML/10ML
2.2 INJECTION INTRAMUSCULAR; INTRAVENOUS; SUBCUTANEOUS ONCE
OUTPATIENT
Start: 2025-03-26 | End: 2025-03-26

## 2025-03-26 RX ORDER — SODIUM CHLORIDE 0.9 % (FLUSH) 0.9 %
10 SYRINGE (ML) INJECTION PRN
Status: DISCONTINUED | OUTPATIENT
Start: 2025-03-26 | End: 2025-03-27 | Stop reason: HOSPADM

## 2025-03-26 RX ORDER — SODIUM CHLORIDE 0.9 % (FLUSH) 0.9 %
10 SYRINGE (ML) INJECTION PRN
OUTPATIENT
Start: 2025-03-26

## 2025-03-26 RX ORDER — HEPARIN 100 UNIT/ML
500 SYRINGE INTRAVENOUS PRN
OUTPATIENT
Start: 2025-03-26

## 2025-03-26 RX ORDER — HEPARIN 100 UNIT/ML
500 SYRINGE INTRAVENOUS PRN
Status: DISCONTINUED | OUTPATIENT
Start: 2025-03-26 | End: 2025-03-27 | Stop reason: HOSPADM

## 2025-03-26 RX ADMIN — HEPARIN 500 UNITS: 100 SYRINGE at 07:57

## 2025-03-26 RX ADMIN — SODIUM CHLORIDE, PRESERVATIVE FREE 10 ML: 5 INJECTION INTRAVENOUS at 07:57

## 2025-05-05 ENCOUNTER — HOSPITAL ENCOUNTER (OUTPATIENT)
Age: 75
Discharge: HOME | End: 2025-05-05
Payer: MEDICARE

## 2025-05-05 DIAGNOSIS — C78.7 RECTAL CANCER METASTASIZED TO LIVER (HCC): ICD-10-CM

## 2025-05-05 DIAGNOSIS — C20 RECTAL CANCER METASTASIZED TO LIVER (HCC): ICD-10-CM

## 2025-05-05 LAB
ALBUMIN SERPL-MCNC: 4.5 G/DL (ref 3.5–5.2)
ALP SERPL-CCNC: 121 U/L (ref 40–129)
ALT SERPL-CCNC: 25 U/L (ref 0–40)
ANION GAP SERPL CALCULATED.3IONS-SCNC: 15 MMOL/L (ref 7–16)
AST SERPL-CCNC: 24 U/L (ref 0–39)
BASOPHILS # BLD: 0.05 K/UL (ref 0–0.2)
BASOPHILS NFR BLD: 1 % (ref 0–2)
BILIRUB SERPL-MCNC: 0.6 MG/DL (ref 0–1.2)
BUN SERPL-MCNC: 22 MG/DL (ref 6–23)
CALCIUM SERPL-MCNC: 9.6 MG/DL (ref 8.6–10.2)
CEA SERPL-MCNC: 1.7 NG/ML (ref 0–5.2)
CHLORIDE SERPL-SCNC: 104 MMOL/L (ref 98–107)
CO2 SERPL-SCNC: 21 MMOL/L (ref 22–29)
CREAT SERPL-MCNC: 1.5 MG/DL (ref 0.7–1.2)
EOSINOPHIL # BLD: 0.13 K/UL (ref 0.05–0.5)
EOSINOPHILS RELATIVE PERCENT: 2 % (ref 0–6)
ERYTHROCYTE [DISTWIDTH] IN BLOOD BY AUTOMATED COUNT: 13.7 % (ref 11.5–15)
GFR, ESTIMATED: 48 ML/MIN/1.73M2
GLUCOSE SERPL-MCNC: 114 MG/DL (ref 74–99)
HCT VFR BLD AUTO: 38.7 % (ref 37–54)
HGB BLD-MCNC: 13 G/DL (ref 12.5–16.5)
IMM GRANULOCYTES # BLD AUTO: 0.06 K/UL (ref 0–0.58)
IMM GRANULOCYTES NFR BLD: 1 % (ref 0–5)
LYMPHOCYTES NFR BLD: 1.12 K/UL (ref 1.5–4)
LYMPHOCYTES RELATIVE PERCENT: 19 % (ref 20–42)
MCH RBC QN AUTO: 31.3 PG (ref 26–35)
MCHC RBC AUTO-ENTMCNC: 33.6 G/DL (ref 32–34.5)
MCV RBC AUTO: 93.3 FL (ref 80–99.9)
MONOCYTES NFR BLD: 0.46 K/UL (ref 0.1–0.95)
MONOCYTES NFR BLD: 8 % (ref 2–12)
NEUTROPHILS NFR BLD: 68 % (ref 43–80)
NEUTS SEG NFR BLD: 3.94 K/UL (ref 1.8–7.3)
PLATELET # BLD AUTO: 188 K/UL (ref 130–450)
PMV BLD AUTO: 9.5 FL (ref 7–12)
POTASSIUM SERPL-SCNC: 4.4 MMOL/L (ref 3.5–5)
PROT SERPL-MCNC: 7.7 G/DL (ref 6.4–8.3)
RBC # BLD AUTO: 4.15 M/UL (ref 3.8–5.8)
SODIUM SERPL-SCNC: 140 MMOL/L (ref 132–146)
WBC OTHER # BLD: 5.8 K/UL (ref 4.5–11.5)

## 2025-05-05 PROCEDURE — 80053 COMPREHEN METABOLIC PANEL: CPT

## 2025-05-05 PROCEDURE — 36415 COLL VENOUS BLD VENIPUNCTURE: CPT

## 2025-05-05 PROCEDURE — 82378 CARCINOEMBRYONIC ANTIGEN: CPT

## 2025-05-05 PROCEDURE — 85025 COMPLETE CBC W/AUTO DIFF WBC: CPT

## 2025-05-07 ENCOUNTER — HOSPITAL ENCOUNTER (OUTPATIENT)
Dept: INFUSION THERAPY | Age: 75
Discharge: HOME | End: 2025-05-07
Payer: MEDICARE

## 2025-05-07 ENCOUNTER — OFFICE VISIT (OUTPATIENT)
Dept: ONCOLOGY | Age: 75
End: 2025-05-07
Payer: MEDICARE

## 2025-05-07 VITALS
WEIGHT: 242.8 LBS | DIASTOLIC BLOOD PRESSURE: 84 MMHG | BODY MASS INDEX: 32.18 KG/M2 | SYSTOLIC BLOOD PRESSURE: 161 MMHG | TEMPERATURE: 97.8 F | HEIGHT: 73 IN | OXYGEN SATURATION: 99 % | HEART RATE: 61 BPM

## 2025-05-07 DIAGNOSIS — C78.7 RECTAL CANCER METASTASIZED TO LIVER (HCC): Primary | ICD-10-CM

## 2025-05-07 DIAGNOSIS — C20 RECTAL CANCER METASTASIZED TO LIVER (HCC): Primary | ICD-10-CM

## 2025-05-07 PROCEDURE — 2500000003 HC RX 250 WO HCPCS: Performed by: STUDENT IN AN ORGANIZED HEALTH CARE EDUCATION/TRAINING PROGRAM

## 2025-05-07 PROCEDURE — 6360000002 HC RX W HCPCS: Performed by: STUDENT IN AN ORGANIZED HEALTH CARE EDUCATION/TRAINING PROGRAM

## 2025-05-07 PROCEDURE — 1159F MED LIST DOCD IN RCRD: CPT | Performed by: STUDENT IN AN ORGANIZED HEALTH CARE EDUCATION/TRAINING PROGRAM

## 2025-05-07 PROCEDURE — 1123F ACP DISCUSS/DSCN MKR DOCD: CPT | Performed by: STUDENT IN AN ORGANIZED HEALTH CARE EDUCATION/TRAINING PROGRAM

## 2025-05-07 PROCEDURE — 96523 IRRIG DRUG DELIVERY DEVICE: CPT

## 2025-05-07 PROCEDURE — 1125F AMNT PAIN NOTED PAIN PRSNT: CPT | Performed by: STUDENT IN AN ORGANIZED HEALTH CARE EDUCATION/TRAINING PROGRAM

## 2025-05-07 PROCEDURE — 36591 DRAW BLOOD OFF VENOUS DEVICE: CPT

## 2025-05-07 PROCEDURE — 99214 OFFICE O/P EST MOD 30 MIN: CPT

## 2025-05-07 PROCEDURE — 99213 OFFICE O/P EST LOW 20 MIN: CPT | Performed by: STUDENT IN AN ORGANIZED HEALTH CARE EDUCATION/TRAINING PROGRAM

## 2025-05-07 RX ORDER — ALLOPURINOL 300 MG/1
300 TABLET ORAL DAILY
COMMUNITY

## 2025-05-07 RX ORDER — HEPARIN 100 UNIT/ML
500 SYRINGE INTRAVENOUS PRN
Status: DISCONTINUED | OUTPATIENT
Start: 2025-05-07 | End: 2025-05-08 | Stop reason: HOSPADM

## 2025-05-07 RX ORDER — WATER 10 ML/10ML
2.2 INJECTION INTRAMUSCULAR; INTRAVENOUS; SUBCUTANEOUS ONCE
OUTPATIENT
Start: 2025-05-07 | End: 2025-05-07

## 2025-05-07 RX ORDER — SODIUM CHLORIDE 0.9 % (FLUSH) 0.9 %
10 SYRINGE (ML) INJECTION PRN
Status: DISCONTINUED | OUTPATIENT
Start: 2025-05-07 | End: 2025-05-08 | Stop reason: HOSPADM

## 2025-05-07 RX ORDER — SODIUM CHLORIDE 0.9 % (FLUSH) 0.9 %
10 SYRINGE (ML) INJECTION PRN
OUTPATIENT
Start: 2025-05-07

## 2025-05-07 RX ORDER — HEPARIN 100 UNIT/ML
500 SYRINGE INTRAVENOUS PRN
OUTPATIENT
Start: 2025-05-07

## 2025-05-07 RX ADMIN — HEPARIN 500 UNITS: 100 SYRINGE at 08:53

## 2025-05-07 RX ADMIN — SODIUM CHLORIDE, PRESERVATIVE FREE 10 ML: 5 INJECTION INTRAVENOUS at 08:53

## 2025-05-07 NOTE — PROGRESS NOTES
[facility] MEDICAL ONCOLOGY  [address] [address]  Dept: [phone]  Loc: [phone]              Attending Clinic Note     Reason for Visit: Follow-up on a patient with Metastatic Rectal Cancer, in remission, off treatment     PCP: Josefina Maya MD       Subjective:  Patient continues to do well.  No abdominal pain, melena/hematochezia.          Oncologic History:  75 y.o.   male who was referred to see Dr. Tucker Boogie (GI team) for evaluation of bright red blood per rectum, mild anemia and change in bowel habits with diarrhea.   CEA 2640 on 10/19/2015. AlcP 299 AST 57 ALT 75 on 10/19/2015.  Colonoscopy in 2013 noted no significant polyps, colitis or lesions at that time. Denies any Family History of colorectal cancer or polyps.     Colonoscopy on 10/19/2015 revealed:  1. Ascending polyp, 8 mm, hot snare: Tubulovillous adenoma.  2. Transverse polyp, 1 cm, hot snare: Serrated polyp most consistent with sessile serrated adenoma.  3. Five splenic flexure polyps, three - 5 mm, 7 mm, 8 mm, hot snare and biopsy: Four segments of Tubular Adenoma  4. Descending polyp, 5 mm, biopsy: Tubular adenoma.  5. Sigmoid polyp, 4 mm biopsy, Serrated polyp most consistent with sessile serrated adenoma.   6. Two rectal polyps, 4 mm biopsy: Serrated polyp most consistent with hyperplastic polyp.   7. Rectosigmoid colon mass (large mass approximately 65% circumference of the lumen; Very friable, firm and hard):  Tubulovillous adenoma with associated focal erosion and fibroplasia.      CT scan abdomen/pelvis on 10/26/2015:  1. Small nodules at lung bases likely represent metastatic colon   cancer.   2. Extensive likely metastatic colon cancer throughout the liver.      3. Mild mural thickening and luminal narrowing in the   terminal ileum, otherwise nonspecific.      Colonoscopy with snare removal rectal mass was performed by Dr. Her. Pathology

## 2025-05-07 NOTE — FLOWSHEET NOTE
Patient presents to clinic for port flush. left chest SQ port accessed per policy, using 20G .75 in lee needle for good blood return. Site flushed easily with 10 mL NSS followed by 5 mL Heparin solution 100 units/ml rinse prior to de-access. Dry sterile dressing to area.

## 2025-05-16 ENCOUNTER — TELEPHONE (OUTPATIENT)
Age: 75
End: 2025-05-16

## 2025-05-16 NOTE — TELEPHONE ENCOUNTER
Contacted pt to reschedule follow up with Dr. Phillip. No answer at this time. Left voicemail with appointment time and dates with request for call back if either do not work.

## 2025-05-27 ENCOUNTER — APPOINTMENT (OUTPATIENT)
Dept: GENERAL RADIOLOGY | Age: 75
End: 2025-05-27
Payer: MEDICARE

## 2025-05-27 ENCOUNTER — APPOINTMENT (OUTPATIENT)
Dept: CT IMAGING | Age: 75
End: 2025-05-27
Payer: MEDICARE

## 2025-05-27 ENCOUNTER — HOSPITAL ENCOUNTER (EMERGENCY)
Age: 75
Discharge: HOME | End: 2025-05-27
Attending: EMERGENCY MEDICINE
Payer: MEDICARE

## 2025-05-27 VITALS
RESPIRATION RATE: 16 BRPM | DIASTOLIC BLOOD PRESSURE: 85 MMHG | HEART RATE: 68 BPM | SYSTOLIC BLOOD PRESSURE: 157 MMHG | OXYGEN SATURATION: 99 % | TEMPERATURE: 98.2 F

## 2025-05-27 DIAGNOSIS — N28.9 RENAL INSUFFICIENCY: ICD-10-CM

## 2025-05-27 DIAGNOSIS — I10 HYPERTENSION, UNSPECIFIED TYPE: Primary | ICD-10-CM

## 2025-05-27 LAB
ANION GAP SERPL CALCULATED.3IONS-SCNC: 12 MMOL/L (ref 7–16)
BASOPHILS # BLD: 0.04 K/UL (ref 0–0.2)
BASOPHILS NFR BLD: 1 % (ref 0–2)
BNP SERPL-MCNC: 120 PG/ML (ref 0–450)
BUN SERPL-MCNC: 24 MG/DL (ref 6–23)
CALCIUM SERPL-MCNC: 9.9 MG/DL (ref 8.6–10.2)
CHLORIDE SERPL-SCNC: 101 MMOL/L (ref 98–107)
CO2 SERPL-SCNC: 24 MMOL/L (ref 22–29)
CREAT SERPL-MCNC: 1.7 MG/DL (ref 0.7–1.2)
EOSINOPHIL # BLD: 0.04 K/UL (ref 0.05–0.5)
EOSINOPHILS RELATIVE PERCENT: 1 % (ref 0–6)
ERYTHROCYTE [DISTWIDTH] IN BLOOD BY AUTOMATED COUNT: 13.4 % (ref 11.5–15)
GFR, ESTIMATED: 42 ML/MIN/1.73M2
GLUCOSE SERPL-MCNC: 138 MG/DL (ref 74–99)
HCT VFR BLD AUTO: 37.1 % (ref 37–54)
HGB BLD-MCNC: 12.7 G/DL (ref 12.5–16.5)
IMM GRANULOCYTES # BLD AUTO: 0.04 K/UL (ref 0–0.58)
IMM GRANULOCYTES NFR BLD: 1 % (ref 0–5)
INR PPP: 1.1
LYMPHOCYTES NFR BLD: 0.81 K/UL (ref 1.5–4)
LYMPHOCYTES RELATIVE PERCENT: 10 % (ref 20–42)
MAGNESIUM SERPL-MCNC: 2 MG/DL (ref 1.6–2.6)
MCH RBC QN AUTO: 31.4 PG (ref 26–35)
MCHC RBC AUTO-ENTMCNC: 34.2 G/DL (ref 32–34.5)
MCV RBC AUTO: 91.6 FL (ref 80–99.9)
MONOCYTES NFR BLD: 0.32 K/UL (ref 0.1–0.95)
MONOCYTES NFR BLD: 4 % (ref 2–12)
NEUTROPHILS NFR BLD: 85 % (ref 43–80)
NEUTS SEG NFR BLD: 6.93 K/UL (ref 1.8–7.3)
PARTIAL THROMBOPLASTIN TIME: 30.2 SEC (ref 24.5–35.1)
PLATELET # BLD AUTO: 193 K/UL (ref 130–450)
PMV BLD AUTO: 9.4 FL (ref 7–12)
POTASSIUM SERPL-SCNC: 4.4 MMOL/L (ref 3.5–5)
PROTHROMBIN TIME: 11.3 SEC (ref 9.3–12.4)
RBC # BLD AUTO: 4.05 M/UL (ref 3.8–5.8)
SODIUM SERPL-SCNC: 137 MMOL/L (ref 132–146)
TROPONIN I SERPL HS-MCNC: 14 NG/L (ref 0–22)
WBC OTHER # BLD: 8.2 K/UL (ref 4.5–11.5)

## 2025-05-27 PROCEDURE — 85730 THROMBOPLASTIN TIME PARTIAL: CPT

## 2025-05-27 PROCEDURE — 80048 BASIC METABOLIC PNL TOTAL CA: CPT

## 2025-05-27 PROCEDURE — 83880 ASSAY OF NATRIURETIC PEPTIDE: CPT

## 2025-05-27 PROCEDURE — 70450 CT HEAD/BRAIN W/O DYE: CPT

## 2025-05-27 PROCEDURE — 71046 X-RAY EXAM CHEST 2 VIEWS: CPT

## 2025-05-27 PROCEDURE — 83735 ASSAY OF MAGNESIUM: CPT

## 2025-05-27 PROCEDURE — 99285 EMERGENCY DEPT VISIT HI MDM: CPT

## 2025-05-27 PROCEDURE — 85610 PROTHROMBIN TIME: CPT

## 2025-05-27 PROCEDURE — 85025 COMPLETE CBC W/AUTO DIFF WBC: CPT

## 2025-05-27 PROCEDURE — 84484 ASSAY OF TROPONIN QUANT: CPT

## 2025-05-27 ASSESSMENT — ENCOUNTER SYMPTOMS
DIARRHEA: 0
WHEEZING: 0
NAUSEA: 0
ABDOMINAL PAIN: 0
SORE THROAT: 0
VOMITING: 0
COUGH: 0
BACK PAIN: 0
SHORTNESS OF BREATH: 0
CHEST TIGHTNESS: 0
EYE PAIN: 0

## 2025-05-27 ASSESSMENT — LIFESTYLE VARIABLES
HOW OFTEN DO YOU HAVE A DRINK CONTAINING ALCOHOL: NEVER
HOW MANY STANDARD DRINKS CONTAINING ALCOHOL DO YOU HAVE ON A TYPICAL DAY: PATIENT DOES NOT DRINK

## 2025-05-27 NOTE — ED PROVIDER NOTES
Chief complaint:  Hypertension      HPI history provided by the patient  The patient presents here with a history of hypertension states that this morning or earlier today he felt woozy and wobbly with no headache and no true spinning vertiginous symptoms and no near syncope.  States he laid down and then he got up and he checked his blood pressure and it was elevated although his other symptoms were slowly improving, he states right now he is feeling much better pretty much all symptoms have resolved at this time he does not feel dizzy or lightheaded no headache.  No chest pain or palpitation or shortness of breath.  No abdominal pain.  No extremity numbness, tingling, paresthesia or weakness.  No falls or injuries.    Review of Systems   Constitutional:  Negative for chills, diaphoresis, fatigue and fever.   HENT:  Negative for congestion and sore throat.    Eyes:  Negative for pain and visual disturbance.   Respiratory:  Negative for cough, chest tightness, shortness of breath and wheezing.    Cardiovascular:  Negative for chest pain, palpitations and leg swelling.   Gastrointestinal:  Negative for abdominal pain, diarrhea, nausea and vomiting.   Genitourinary:  Negative for dysuria, flank pain, frequency and urgency.   Musculoskeletal:  Negative for arthralgias, back pain, gait problem, joint swelling, myalgias, neck pain and neck stiffness.   Skin:  Negative for rash and wound.   Neurological:  Positive for dizziness. Negative for tremors, seizures, syncope, facial asymmetry, speech difficulty, weakness, light-headedness, numbness and headaches.   All other systems reviewed and are negative.       Physical Exam  Vitals and nursing note reviewed.   Constitutional:       General: He is awake. He is not in acute distress.     Appearance: He is well-developed. He is not ill-appearing, toxic-appearing or diaphoretic.   HENT:      Head: Normocephalic and atraumatic.      Comments: No sign of acute head or face

## 2025-06-01 LAB
EKG ATRIAL RATE: 64 BPM
EKG P AXIS: 85 DEGREES
EKG P-R INTERVAL: 216 MS
EKG Q-T INTERVAL: 462 MS
EKG QRS DURATION: 158 MS
EKG QTC CALCULATION (BAZETT): 476 MS
EKG R AXIS: -61 DEGREES
EKG T AXIS: 57 DEGREES
EKG VENTRICULAR RATE: 64 BPM

## 2025-07-15 ENCOUNTER — HOSPITAL ENCOUNTER (OUTPATIENT)
Dept: INFUSION THERAPY | Age: 75
Discharge: HOME | End: 2025-07-15
Payer: MEDICARE

## 2025-07-15 DIAGNOSIS — C20 RECTAL CANCER METASTASIZED TO LIVER (HCC): Primary | ICD-10-CM

## 2025-07-15 DIAGNOSIS — C78.7 RECTAL CANCER METASTASIZED TO LIVER (HCC): Primary | ICD-10-CM

## 2025-07-15 PROCEDURE — 96523 IRRIG DRUG DELIVERY DEVICE: CPT

## 2025-07-15 PROCEDURE — 6360000002 HC RX W HCPCS: Performed by: STUDENT IN AN ORGANIZED HEALTH CARE EDUCATION/TRAINING PROGRAM

## 2025-07-15 PROCEDURE — 2500000003 HC RX 250 WO HCPCS: Performed by: STUDENT IN AN ORGANIZED HEALTH CARE EDUCATION/TRAINING PROGRAM

## 2025-07-15 RX ORDER — HEPARIN 100 UNIT/ML
500 SYRINGE INTRAVENOUS PRN
OUTPATIENT
Start: 2025-07-15

## 2025-07-15 RX ORDER — SODIUM CHLORIDE 0.9 % (FLUSH) 0.9 %
10 SYRINGE (ML) INJECTION PRN
Status: DISCONTINUED | OUTPATIENT
Start: 2025-07-15 | End: 2025-07-16 | Stop reason: HOSPADM

## 2025-07-15 RX ORDER — WATER 10 ML/10ML
2.2 INJECTION INTRAMUSCULAR; INTRAVENOUS; SUBCUTANEOUS ONCE
OUTPATIENT
Start: 2025-07-15 | End: 2025-07-15

## 2025-07-15 RX ORDER — HEPARIN 100 UNIT/ML
500 SYRINGE INTRAVENOUS PRN
Status: DISCONTINUED | OUTPATIENT
Start: 2025-07-15 | End: 2025-07-16 | Stop reason: HOSPADM

## 2025-07-15 RX ORDER — SODIUM CHLORIDE 0.9 % (FLUSH) 0.9 %
10 SYRINGE (ML) INJECTION PRN
OUTPATIENT
Start: 2025-07-15

## 2025-07-15 RX ADMIN — HEPARIN 500 UNITS: 100 SYRINGE at 08:14

## 2025-07-15 RX ADMIN — SODIUM CHLORIDE, PRESERVATIVE FREE 10 ML: 5 INJECTION INTRAVENOUS at 08:14

## 2025-07-21 ENCOUNTER — HOSPITAL ENCOUNTER (OUTPATIENT)
Age: 75
Discharge: HOME | End: 2025-07-21
Payer: MEDICARE

## 2025-07-21 DIAGNOSIS — C20 RECTAL CANCER METASTASIZED TO LIVER (HCC): ICD-10-CM

## 2025-07-21 DIAGNOSIS — C78.7 RECTAL CANCER METASTASIZED TO LIVER (HCC): ICD-10-CM

## 2025-07-21 LAB
ALBUMIN SERPL-MCNC: 4.2 G/DL (ref 3.5–5.2)
ALP SERPL-CCNC: 98 U/L (ref 40–129)
ALT SERPL-CCNC: 21 U/L (ref 0–50)
ANION GAP SERPL CALCULATED.3IONS-SCNC: 10 MMOL/L (ref 7–16)
AST SERPL-CCNC: 25 U/L (ref 0–50)
BASOPHILS # BLD: 0.04 K/UL (ref 0–0.2)
BASOPHILS NFR BLD: 1 % (ref 0–2)
BILIRUB SERPL-MCNC: 0.6 MG/DL (ref 0–1.2)
BUN SERPL-MCNC: 22 MG/DL (ref 8–23)
CALCIUM SERPL-MCNC: 9.2 MG/DL (ref 8.8–10.2)
CEA SERPL-MCNC: 1.9 NG/ML (ref 0–5.2)
CHLORIDE SERPL-SCNC: 105 MMOL/L (ref 98–107)
CO2 SERPL-SCNC: 24 MMOL/L (ref 22–29)
CREAT SERPL-MCNC: 1.3 MG/DL (ref 0.7–1.2)
EOSINOPHIL # BLD: 0.11 K/UL (ref 0.05–0.5)
EOSINOPHILS RELATIVE PERCENT: 2 % (ref 0–6)
ERYTHROCYTE [DISTWIDTH] IN BLOOD BY AUTOMATED COUNT: 12.9 % (ref 11.5–15)
GFR, ESTIMATED: 58 ML/MIN/1.73M2
GLUCOSE SERPL-MCNC: 109 MG/DL (ref 74–99)
HCT VFR BLD AUTO: 36.9 % (ref 37–54)
HGB BLD-MCNC: 12.9 G/DL (ref 12.5–16.5)
IMM GRANULOCYTES # BLD AUTO: 0.04 K/UL (ref 0–0.58)
IMM GRANULOCYTES NFR BLD: 1 % (ref 0–5)
LYMPHOCYTES NFR BLD: 0.93 K/UL (ref 1.5–4)
LYMPHOCYTES RELATIVE PERCENT: 15 % (ref 20–42)
MCH RBC QN AUTO: 31.9 PG (ref 26–35)
MCHC RBC AUTO-ENTMCNC: 35 G/DL (ref 32–34.5)
MCV RBC AUTO: 91.3 FL (ref 80–99.9)
MONOCYTES NFR BLD: 0.45 K/UL (ref 0.1–0.95)
MONOCYTES NFR BLD: 7 % (ref 2–12)
NEUTROPHILS NFR BLD: 75 % (ref 43–80)
NEUTS SEG NFR BLD: 4.59 K/UL (ref 1.8–7.3)
PLATELET # BLD AUTO: 178 K/UL (ref 130–450)
PMV BLD AUTO: 9.7 FL (ref 7–12)
POTASSIUM SERPL-SCNC: 5.1 MMOL/L (ref 3.5–5.1)
PROT SERPL-MCNC: 7.4 G/DL (ref 6.4–8.3)
RBC # BLD AUTO: 4.04 M/UL (ref 3.8–5.8)
SODIUM SERPL-SCNC: 139 MMOL/L (ref 136–145)
WBC OTHER # BLD: 6.2 K/UL (ref 4.5–11.5)

## 2025-07-21 PROCEDURE — 85025 COMPLETE CBC W/AUTO DIFF WBC: CPT

## 2025-07-21 PROCEDURE — 82378 CARCINOEMBRYONIC ANTIGEN: CPT

## 2025-07-21 PROCEDURE — 36415 COLL VENOUS BLD VENIPUNCTURE: CPT

## 2025-07-21 PROCEDURE — 80053 COMPREHEN METABOLIC PANEL: CPT

## 2025-07-28 ENCOUNTER — HOSPITAL ENCOUNTER (OUTPATIENT)
Dept: CT IMAGING | Age: 75
Discharge: HOME | End: 2025-07-28
Payer: MEDICARE

## 2025-07-28 DIAGNOSIS — C20 RECTAL CANCER METASTASIZED TO LIVER (HCC): ICD-10-CM

## 2025-07-28 DIAGNOSIS — C78.7 RECTAL CANCER METASTASIZED TO LIVER (HCC): ICD-10-CM

## 2025-07-28 PROCEDURE — 6360000004 HC RX CONTRAST MEDICATION: Performed by: RADIOLOGY

## 2025-07-28 PROCEDURE — 74177 CT ABD & PELVIS W/CONTRAST: CPT

## 2025-07-28 PROCEDURE — 71260 CT THORAX DX C+: CPT

## 2025-07-28 RX ORDER — IOPAMIDOL 755 MG/ML
70 INJECTION, SOLUTION INTRAVASCULAR
Status: COMPLETED | OUTPATIENT
Start: 2025-07-28 | End: 2025-07-28

## 2025-07-28 RX ADMIN — IOPAMIDOL 70 ML: 755 INJECTION, SOLUTION INTRAVENOUS at 08:41

## 2025-08-06 ENCOUNTER — HOSPITAL ENCOUNTER (OUTPATIENT)
Dept: INFUSION THERAPY | Age: 75
Discharge: HOME | End: 2025-08-06
Payer: MEDICARE

## 2025-08-06 ENCOUNTER — OFFICE VISIT (OUTPATIENT)
Age: 75
End: 2025-08-06
Payer: MEDICARE

## 2025-08-06 VITALS
SYSTOLIC BLOOD PRESSURE: 145 MMHG | WEIGHT: 240.6 LBS | HEART RATE: 61 BPM | TEMPERATURE: 98.1 F | OXYGEN SATURATION: 98 % | BODY MASS INDEX: 31.89 KG/M2 | DIASTOLIC BLOOD PRESSURE: 83 MMHG | HEIGHT: 73 IN

## 2025-08-06 DIAGNOSIS — C20 RECTAL CANCER METASTASIZED TO LIVER (HCC): Primary | ICD-10-CM

## 2025-08-06 DIAGNOSIS — C78.7 RECTAL CANCER METASTASIZED TO LIVER (HCC): Primary | ICD-10-CM

## 2025-08-06 PROCEDURE — 99215 OFFICE O/P EST HI 40 MIN: CPT | Performed by: INTERNAL MEDICINE

## 2025-08-06 PROCEDURE — 1159F MED LIST DOCD IN RCRD: CPT | Performed by: INTERNAL MEDICINE

## 2025-08-06 PROCEDURE — 1125F AMNT PAIN NOTED PAIN PRSNT: CPT | Performed by: INTERNAL MEDICINE

## 2025-08-06 PROCEDURE — 2500000003 HC RX 250 WO HCPCS: Performed by: STUDENT IN AN ORGANIZED HEALTH CARE EDUCATION/TRAINING PROGRAM

## 2025-08-06 PROCEDURE — 1123F ACP DISCUSS/DSCN MKR DOCD: CPT | Performed by: INTERNAL MEDICINE

## 2025-08-06 PROCEDURE — 96523 IRRIG DRUG DELIVERY DEVICE: CPT

## 2025-08-06 PROCEDURE — G2211 COMPLEX E/M VISIT ADD ON: HCPCS | Performed by: INTERNAL MEDICINE

## 2025-08-06 PROCEDURE — 6360000002 HC RX W HCPCS: Performed by: STUDENT IN AN ORGANIZED HEALTH CARE EDUCATION/TRAINING PROGRAM

## 2025-08-06 RX ORDER — SODIUM CHLORIDE 0.9 % (FLUSH) 0.9 %
10 SYRINGE (ML) INJECTION PRN
Status: DISCONTINUED | OUTPATIENT
Start: 2025-08-06 | End: 2025-08-07 | Stop reason: HOSPADM

## 2025-08-06 RX ORDER — HEPARIN 100 UNIT/ML
500 SYRINGE INTRAVENOUS PRN
OUTPATIENT
Start: 2025-08-06

## 2025-08-06 RX ORDER — HEPARIN 100 UNIT/ML
500 SYRINGE INTRAVENOUS PRN
Status: DISCONTINUED | OUTPATIENT
Start: 2025-08-06 | End: 2025-08-07 | Stop reason: HOSPADM

## 2025-08-06 RX ORDER — SODIUM CHLORIDE 0.9 % (FLUSH) 0.9 %
10 SYRINGE (ML) INJECTION PRN
OUTPATIENT
Start: 2025-08-06

## 2025-08-06 RX ORDER — WATER 10 ML/10ML
2.2 INJECTION INTRAMUSCULAR; INTRAVENOUS; SUBCUTANEOUS ONCE
OUTPATIENT
Start: 2025-08-06 | End: 2025-08-06

## 2025-08-06 RX ADMIN — HEPARIN 500 UNITS: 100 SYRINGE at 08:49

## 2025-08-06 RX ADMIN — SODIUM CHLORIDE, PRESERVATIVE FREE 10 ML: 5 INJECTION INTRAVENOUS at 08:48

## (undated) DEVICE — GLOVE ORANGE PI 7 1/2   MSG9075

## (undated) DEVICE — SCISSORS SURG DIA8MM MPLR CRV ENDOWRIST

## (undated) DEVICE — PACK,LAPAROTOMY,NO GOWNS: Brand: MEDLINE

## (undated) DEVICE — Z INACTIVE USE 2660664 SOLUTION IRRIG 3000ML 0.9% SOD CHL USP UROMATIC PLAS CONT

## (undated) DEVICE — PLUMEPORT LAPAROSCOPIC SMOKE FILTRATION DEVICE: Brand: PLUMEPORT ACTIV

## (undated) DEVICE — ELECTRO LUBE IS A SINGLE PATIENT USE DEVICE THAT IS INTENDED TO BE USED ON ELECTROSURGICAL ELECTRODES TO REDUCE STICKING.: Brand: KEY SURGICAL ELECTRO LUBE

## (undated) DEVICE — READY WET SKIN SCRUB TRAY-LF: Brand: MEDLINE INDUSTRIES, INC.

## (undated) DEVICE — SYRINGE MED 10ML TRNSLUC BRL PLUNG BLK MRK POLYPR CTRL

## (undated) DEVICE — TUBING, SUCTION, 1/4" X 10', STRAIGHT: Brand: MEDLINE

## (undated) DEVICE — TOWEL,OR,DSP,ST,BLUE,STD,6/PK,12PK/CS: Brand: MEDLINE

## (undated) DEVICE — SET MAJOR INSTR HOUSE

## (undated) DEVICE — PACK SURG LAP CHOLE CUSTOM

## (undated) DEVICE — COVER,LIGHT HANDLE,FLX,1/PK: Brand: MEDLINE INDUSTRIES, INC.

## (undated) DEVICE — DOUBLE BASIN SET: Brand: MEDLINE INDUSTRIES, INC.

## (undated) DEVICE — CADIERE FORCEPS: Brand: ENDOWRIST

## (undated) DEVICE — SYRINGE MED 10ML LUERLOCK TIP W/O SFTY DISP

## (undated) DEVICE — SYRINGE 20ML LL S/C 50

## (undated) DEVICE — ELECTRODE PT RET AD L9FT HI MOIST COND ADH HYDRGEL CORDED

## (undated) DEVICE — BLADELESS OBTURATOR: Brand: WECK VISTA

## (undated) DEVICE — INTENDED FOR TISSUE SEPARATION, AND OTHER PROCEDURES THAT REQUIRE A SHARP SURGICAL BLADE TO PUNCTURE OR CUT.: Brand: BARD-PARKER ® STAINLESS STEEL BLADES

## (undated) DEVICE — PENCIL ES L3M BTTN SWCH HOLSTER W/ BLDE ELECTRD EDGE

## (undated) DEVICE — NEEDLE HYPO 25GA L1.5IN BLU POLYPR HUB S STL REG BVL STR

## (undated) DEVICE — MEDIUM-LARGE CLIP APPLIER: Brand: ENDOWRIST

## (undated) DEVICE — GAUZE,SPONGE,4"X4",16PLY,XRAY,STRL,LF: Brand: MEDLINE

## (undated) DEVICE — SET ENDO INSTR LAPAROSCOPIC INCISIONAL

## (undated) DEVICE — GARMENT,MEDLINE,DVT,INT,CALF,MED, GEN2: Brand: MEDLINE

## (undated) DEVICE — YANKAUER,POOLE TIP,STERILE,50/CS: Brand: MEDLINE

## (undated) DEVICE — DRAPE 64X41IN RADIOLOGY C ARM EQUIP STER

## (undated) DEVICE — APPLICATOR MEDICATED 26 CC SOLUTION HI LT ORNG CHLORAPREP

## (undated) DEVICE — SCOPE DAVINCI XI 30 DEG W/CORD

## (undated) DEVICE — TROCAR: Brand: KII SLEEVE

## (undated) DEVICE — SET ENDO INSTR RED YEL LAPAROSCOPIC

## (undated) DEVICE — SPONGE,PEANUT,XRAY,ST,SM,3/8",5/CARD: Brand: MEDLINE INDUSTRIES, INC.

## (undated) DEVICE — APPLIER CLP M/L SHFT DIA5MM 15 LIG LIGAMAX 5

## (undated) DEVICE — Device: Brand: INSTRUSAFE

## (undated) DEVICE — CANNULA SEAL

## (undated) DEVICE — GOWN,SIRUS,NONRNF,SETINSLV,XL,20/CS: Brand: MEDLINE

## (undated) DEVICE — SUCTION IRRIGATOR: Brand: ENDOWRIST

## (undated) DEVICE — ARM DRAPE

## (undated) DEVICE — [HIGH FLOW INSUFFLATOR,  DO NOT USE IF PACKAGE IS DAMAGED,  KEEP DRY,  KEEP AWAY FROM SUNLIGHT,  PROTECT FROM HEAT AND RADIOACTIVE SOURCES.]: Brand: PNEUMOSURE

## (undated) DEVICE — MARKER,SKIN,WI/RULER AND LABELS: Brand: MEDLINE

## (undated) DEVICE — WARMER SCP LAP

## (undated) DEVICE — MICRO TIP WIPE: Brand: DEVON

## (undated) DEVICE — GOWN,SIRUS,FABRNF,XL,20/CS: Brand: MEDLINE

## (undated) DEVICE — NDL CNTR 40CT FM MAG: Brand: MEDLINE INDUSTRIES, INC.

## (undated) DEVICE — Z INACTIVE USE 2641839 CLIP INT M L POLYMER LOK LIG HEM O LOK

## (undated) DEVICE — TIP COVER ACCESSORY

## (undated) DEVICE — COOK ENDOSCOPIC CHOLANGIOGRAPHY SET: Brand: COOK

## (undated) DEVICE — INSUFFLATION NEEDLE TO ESTABLISH PNEUMOPERITONEUM.: Brand: INSUFFLATION NEEDLE

## (undated) DEVICE — MEDIA CONTRAST ISOVUE GLASS VIALS 250 50ML

## (undated) DEVICE — COLUMN DRAPE

## (undated) DEVICE — Z DISCONTINUED NO SUB IDED BAG SPEC RETRV M C240ML MOUTH 7.3MM L17CM SHFT 10MM NYL EZEE